# Patient Record
Sex: FEMALE | Race: OTHER | HISPANIC OR LATINO | URBAN - METROPOLITAN AREA
[De-identification: names, ages, dates, MRNs, and addresses within clinical notes are randomized per-mention and may not be internally consistent; named-entity substitution may affect disease eponyms.]

---

## 2017-01-05 ENCOUNTER — INPATIENT (INPATIENT)
Facility: HOSPITAL | Age: 37
LOS: 5 days | Discharge: HOME | DRG: 355 | End: 2017-01-11
Attending: SURGERY | Admitting: SURGERY
Payer: COMMERCIAL

## 2017-01-05 VITALS
SYSTOLIC BLOOD PRESSURE: 178 MMHG | DIASTOLIC BLOOD PRESSURE: 126 MMHG | RESPIRATION RATE: 20 BRPM | HEART RATE: 119 BPM | TEMPERATURE: 98 F | OXYGEN SATURATION: 98 %

## 2017-01-05 DIAGNOSIS — Z98.89 OTHER SPECIFIED POSTPROCEDURAL STATES: Chronic | ICD-10-CM

## 2017-01-05 RX ORDER — SODIUM CHLORIDE 9 MG/ML
2000 INJECTION INTRAMUSCULAR; INTRAVENOUS; SUBCUTANEOUS ONCE
Qty: 0 | Refills: 0 | Status: COMPLETED | OUTPATIENT
Start: 2017-01-05 | End: 2017-01-05

## 2017-01-05 RX ORDER — ONDANSETRON 8 MG/1
4 TABLET, FILM COATED ORAL ONCE
Qty: 0 | Refills: 0 | Status: COMPLETED | OUTPATIENT
Start: 2017-01-05 | End: 2017-01-05

## 2017-01-05 RX ORDER — MORPHINE SULFATE 50 MG/1
4 CAPSULE, EXTENDED RELEASE ORAL ONCE
Qty: 0 | Refills: 0 | Status: DISCONTINUED | OUTPATIENT
Start: 2017-01-05 | End: 2017-01-05

## 2017-01-06 ENCOUNTER — RESULT REVIEW (OUTPATIENT)
Age: 37
End: 2017-01-06

## 2017-01-06 LAB
ALBUMIN SERPL ELPH-MCNC: 3 G/DL — LOW (ref 3.4–5)
ALBUMIN SERPL ELPH-MCNC: 3.3 G/DL — LOW (ref 3.4–5)
ALP SERPL-CCNC: 108 U/L — SIGNIFICANT CHANGE UP (ref 40–120)
ALP SERPL-CCNC: 126 U/L — HIGH (ref 40–120)
ALT FLD-CCNC: 28 U/L — SIGNIFICANT CHANGE UP (ref 12–42)
ALT FLD-CCNC: 31 U/L — SIGNIFICANT CHANGE UP (ref 12–42)
ANION GAP SERPL CALC-SCNC: 11 MMOL/L — SIGNIFICANT CHANGE UP (ref 9–16)
ANION GAP SERPL CALC-SCNC: 14 MMOL/L — SIGNIFICANT CHANGE UP (ref 9–16)
APPEARANCE UR: CLEAR — SIGNIFICANT CHANGE UP
APTT BLD: 31 SEC — SIGNIFICANT CHANGE UP (ref 27.5–37.4)
AST SERPL-CCNC: 19 U/L — SIGNIFICANT CHANGE UP (ref 15–37)
AST SERPL-CCNC: 21 U/L — SIGNIFICANT CHANGE UP (ref 15–37)
BASOPHILS NFR BLD AUTO: 0.1 % — SIGNIFICANT CHANGE UP (ref 0–2)
BASOPHILS NFR BLD AUTO: 0.3 % — SIGNIFICANT CHANGE UP (ref 0–2)
BILIRUB SERPL-MCNC: 0.3 MG/DL — SIGNIFICANT CHANGE UP (ref 0.2–1.2)
BILIRUB SERPL-MCNC: 0.3 MG/DL — SIGNIFICANT CHANGE UP (ref 0.2–1.2)
BILIRUB UR-MCNC: NEGATIVE — SIGNIFICANT CHANGE UP
BLD GP AB SCN SERPL QL: NEGATIVE — SIGNIFICANT CHANGE UP
BUN SERPL-MCNC: 6 MG/DL — LOW (ref 7–23)
BUN SERPL-MCNC: 7 MG/DL — SIGNIFICANT CHANGE UP (ref 7–23)
CALCIUM SERPL-MCNC: 7.8 MG/DL — LOW (ref 8.5–10.5)
CALCIUM SERPL-MCNC: 9 MG/DL — SIGNIFICANT CHANGE UP (ref 8.5–10.5)
CHLORIDE SERPL-SCNC: 102 MMOL/L — SIGNIFICANT CHANGE UP (ref 96–108)
CHLORIDE SERPL-SCNC: 107 MMOL/L — SIGNIFICANT CHANGE UP (ref 96–108)
CO2 SERPL-SCNC: 23 MMOL/L — SIGNIFICANT CHANGE UP (ref 22–31)
CO2 SERPL-SCNC: 23 MMOL/L — SIGNIFICANT CHANGE UP (ref 22–31)
COLOR SPEC: YELLOW — SIGNIFICANT CHANGE UP
CREAT SERPL-MCNC: 0.73 MG/DL — SIGNIFICANT CHANGE UP (ref 0.5–1.3)
CREAT SERPL-MCNC: 0.77 MG/DL — SIGNIFICANT CHANGE UP (ref 0.5–1.3)
DIFF PNL FLD: NEGATIVE — SIGNIFICANT CHANGE UP
EOSINOPHIL NFR BLD AUTO: 0.8 % — SIGNIFICANT CHANGE UP (ref 0–6)
EOSINOPHIL NFR BLD AUTO: 1.5 % — SIGNIFICANT CHANGE UP (ref 0–6)
GLUCOSE SERPL-MCNC: 133 MG/DL — HIGH (ref 70–99)
GLUCOSE SERPL-MCNC: 175 MG/DL — HIGH (ref 70–99)
GLUCOSE UR QL: NEGATIVE — SIGNIFICANT CHANGE UP
HCG SERPL-ACNC: <1 MIU/ML — SIGNIFICANT CHANGE UP
HCT VFR BLD CALC: 38.2 % — SIGNIFICANT CHANGE UP (ref 34.5–45)
HCT VFR BLD CALC: 42.2 % — SIGNIFICANT CHANGE UP (ref 34.5–45)
HGB BLD-MCNC: 12.8 G/DL — SIGNIFICANT CHANGE UP (ref 11.5–15.5)
HGB BLD-MCNC: 14 G/DL — SIGNIFICANT CHANGE UP (ref 11.5–15.5)
INR BLD: 1.12 — SIGNIFICANT CHANGE UP (ref 0.88–1.16)
KETONES UR-MCNC: NEGATIVE — SIGNIFICANT CHANGE UP
LACTATE BLDV-MCNC: 1 MMOL/L — SIGNIFICANT CHANGE UP (ref 0.5–2)
LEUKOCYTE ESTERASE UR-ACNC: (no result)
LYMPHOCYTES # BLD AUTO: 20.1 % — SIGNIFICANT CHANGE UP (ref 13–44)
LYMPHOCYTES # BLD AUTO: 30.3 % — SIGNIFICANT CHANGE UP (ref 13–44)
MCHC RBC-ENTMCNC: 25.5 PG — LOW (ref 27–34)
MCHC RBC-ENTMCNC: 26.4 PG — LOW (ref 27–34)
MCHC RBC-ENTMCNC: 33.2 G/DL — SIGNIFICANT CHANGE UP (ref 32–36)
MCHC RBC-ENTMCNC: 33.5 G/DL — SIGNIFICANT CHANGE UP (ref 32–36)
MCV RBC AUTO: 77 FL — LOW (ref 80–100)
MCV RBC AUTO: 78.8 FL — LOW (ref 80–100)
MONOCYTES NFR BLD AUTO: 5.8 % — SIGNIFICANT CHANGE UP (ref 2–14)
MONOCYTES NFR BLD AUTO: 6.5 % — SIGNIFICANT CHANGE UP (ref 2–14)
NEUTROPHILS NFR BLD AUTO: 61.6 % — SIGNIFICANT CHANGE UP (ref 43–77)
NEUTROPHILS NFR BLD AUTO: 73 % — SIGNIFICANT CHANGE UP (ref 43–77)
NITRITE UR-MCNC: NEGATIVE — SIGNIFICANT CHANGE UP
PH UR: 6 — SIGNIFICANT CHANGE UP (ref 4–8)
PLATELET # BLD AUTO: 307 K/UL — SIGNIFICANT CHANGE UP (ref 150–400)
PLATELET # BLD AUTO: 407 K/UL — HIGH (ref 150–400)
POTASSIUM SERPL-MCNC: 3.6 MMOL/L — SIGNIFICANT CHANGE UP (ref 3.5–5.3)
POTASSIUM SERPL-MCNC: 3.6 MMOL/L — SIGNIFICANT CHANGE UP (ref 3.5–5.3)
POTASSIUM SERPL-SCNC: 3.6 MMOL/L — SIGNIFICANT CHANGE UP (ref 3.5–5.3)
POTASSIUM SERPL-SCNC: 3.6 MMOL/L — SIGNIFICANT CHANGE UP (ref 3.5–5.3)
PROT SERPL-MCNC: 6.6 G/DL — SIGNIFICANT CHANGE UP (ref 6.4–8.2)
PROT SERPL-MCNC: 7.8 G/DL — SIGNIFICANT CHANGE UP (ref 6.4–8.2)
PROT UR-MCNC: NEGATIVE MG/DL — SIGNIFICANT CHANGE UP
PROTHROM AB SERPL-ACNC: 12.5 SEC — SIGNIFICANT CHANGE UP (ref 10–13.1)
RBC # BLD: 4.85 M/UL — SIGNIFICANT CHANGE UP (ref 3.8–5.2)
RBC # BLD: 5.48 M/UL — HIGH (ref 3.8–5.2)
RBC # FLD: 16.4 % — SIGNIFICANT CHANGE UP (ref 10.3–16.9)
RBC # FLD: 16.5 % — SIGNIFICANT CHANGE UP (ref 10.3–16.9)
RH IG SCN BLD-IMP: NEGATIVE — SIGNIFICANT CHANGE UP
SODIUM SERPL-SCNC: 139 MMOL/L — SIGNIFICANT CHANGE UP (ref 135–145)
SODIUM SERPL-SCNC: 141 MMOL/L — SIGNIFICANT CHANGE UP (ref 135–145)
SP GR SPEC: 1.02 — SIGNIFICANT CHANGE UP (ref 1–1.03)
UROBILINOGEN FLD QL: 0.2 E.U./DL — SIGNIFICANT CHANGE UP
WBC # BLD: 14 K/UL — HIGH (ref 3.8–10.5)
WBC # BLD: 15.2 K/UL — HIGH (ref 3.8–10.5)
WBC # FLD AUTO: 14 K/UL — HIGH (ref 3.8–10.5)
WBC # FLD AUTO: 15.2 K/UL — HIGH (ref 3.8–10.5)

## 2017-01-06 PROCEDURE — 74176 CT ABD & PELVIS W/O CONTRAST: CPT | Mod: 26

## 2017-01-06 PROCEDURE — 71010: CPT | Mod: 26

## 2017-01-06 RX ORDER — MAGNESIUM SULFATE 500 MG/ML
2 VIAL (ML) INJECTION ONCE
Qty: 0 | Refills: 0 | Status: COMPLETED | OUTPATIENT
Start: 2017-01-06 | End: 2017-01-06

## 2017-01-06 RX ORDER — DEXTROSE 50 % IN WATER 50 %
25 SYRINGE (ML) INTRAVENOUS ONCE
Qty: 0 | Refills: 0 | Status: DISCONTINUED | OUTPATIENT
Start: 2017-01-06 | End: 2017-01-08

## 2017-01-06 RX ORDER — IBUPROFEN 200 MG
600 TABLET ORAL ONCE
Qty: 0 | Refills: 0 | Status: COMPLETED | OUTPATIENT
Start: 2017-01-06 | End: 2017-01-06

## 2017-01-06 RX ORDER — DIPHENHYDRAMINE HCL 50 MG
50 CAPSULE ORAL ONCE
Qty: 0 | Refills: 0 | Status: COMPLETED | OUTPATIENT
Start: 2017-01-06 | End: 2017-01-06

## 2017-01-06 RX ORDER — HYDROMORPHONE HYDROCHLORIDE 2 MG/ML
1 INJECTION INTRAMUSCULAR; INTRAVENOUS; SUBCUTANEOUS EVERY 4 HOURS
Qty: 0 | Refills: 0 | Status: DISCONTINUED | OUTPATIENT
Start: 2017-01-06 | End: 2017-01-08

## 2017-01-06 RX ORDER — ACETAMINOPHEN 500 MG
1000 TABLET ORAL ONCE
Qty: 0 | Refills: 0 | Status: COMPLETED | OUTPATIENT
Start: 2017-01-06 | End: 2017-01-06

## 2017-01-06 RX ORDER — NALBUPHINE HYDROCHLORIDE 10 MG/ML
10 INJECTION, SOLUTION INTRAMUSCULAR; INTRAVENOUS; SUBCUTANEOUS ONCE
Qty: 0 | Refills: 0 | Status: DISCONTINUED | OUTPATIENT
Start: 2017-01-06 | End: 2017-01-06

## 2017-01-06 RX ORDER — HEPARIN SODIUM 5000 [USP'U]/ML
5000 INJECTION INTRAVENOUS; SUBCUTANEOUS EVERY 12 HOURS
Qty: 0 | Refills: 0 | Status: DISCONTINUED | OUTPATIENT
Start: 2017-01-06 | End: 2017-01-11

## 2017-01-06 RX ORDER — INSULIN LISPRO 100/ML
VIAL (ML) SUBCUTANEOUS
Qty: 0 | Refills: 0 | Status: DISCONTINUED | OUTPATIENT
Start: 2017-01-06 | End: 2017-01-11

## 2017-01-06 RX ORDER — ONDANSETRON 8 MG/1
4 TABLET, FILM COATED ORAL EVERY 6 HOURS
Qty: 0 | Refills: 0 | Status: DISCONTINUED | OUTPATIENT
Start: 2017-01-06 | End: 2017-01-11

## 2017-01-06 RX ORDER — HYDROMORPHONE HYDROCHLORIDE 2 MG/ML
1 INJECTION INTRAMUSCULAR; INTRAVENOUS; SUBCUTANEOUS ONCE
Qty: 0 | Refills: 0 | Status: DISCONTINUED | OUTPATIENT
Start: 2017-01-06 | End: 2017-01-06

## 2017-01-06 RX ORDER — SODIUM CHLORIDE 9 MG/ML
1000 INJECTION, SOLUTION INTRAVENOUS
Qty: 0 | Refills: 0 | Status: DISCONTINUED | OUTPATIENT
Start: 2017-01-06 | End: 2017-01-08

## 2017-01-06 RX ORDER — DIATRIZOATE MEGLUMINE 180 MG/ML
30 INJECTION, SOLUTION INTRAVESICAL ONCE
Qty: 0 | Refills: 0 | Status: COMPLETED | OUTPATIENT
Start: 2017-01-06 | End: 2017-01-06

## 2017-01-06 RX ORDER — HYDROMORPHONE HYDROCHLORIDE 2 MG/ML
2 INJECTION INTRAMUSCULAR; INTRAVENOUS; SUBCUTANEOUS EVERY 4 HOURS
Qty: 0 | Refills: 0 | Status: DISCONTINUED | OUTPATIENT
Start: 2017-01-06 | End: 2017-01-08

## 2017-01-06 RX ADMIN — DIATRIZOATE MEGLUMINE 30 MILLILITER(S): 180 INJECTION, SOLUTION INTRAVESICAL at 00:15

## 2017-01-06 RX ADMIN — HYDROMORPHONE HYDROCHLORIDE 2 MILLIGRAM(S): 2 INJECTION INTRAMUSCULAR; INTRAVENOUS; SUBCUTANEOUS at 21:15

## 2017-01-06 RX ADMIN — SODIUM CHLORIDE 125 MILLILITER(S): 9 INJECTION, SOLUTION INTRAVENOUS at 06:27

## 2017-01-06 RX ADMIN — Medication 400 MILLIGRAM(S): at 10:05

## 2017-01-06 RX ADMIN — HYDROMORPHONE HYDROCHLORIDE 1 MILLIGRAM(S): 2 INJECTION INTRAMUSCULAR; INTRAVENOUS; SUBCUTANEOUS at 12:12

## 2017-01-06 RX ADMIN — HYDROMORPHONE HYDROCHLORIDE 2 MILLIGRAM(S): 2 INJECTION INTRAMUSCULAR; INTRAVENOUS; SUBCUTANEOUS at 18:07

## 2017-01-06 RX ADMIN — Medication 600 MILLIGRAM(S): at 06:20

## 2017-01-06 RX ADMIN — Medication 1000 MILLIGRAM(S): at 10:40

## 2017-01-06 RX ADMIN — HYDROMORPHONE HYDROCHLORIDE 1 MILLIGRAM(S): 2 INJECTION INTRAMUSCULAR; INTRAVENOUS; SUBCUTANEOUS at 06:20

## 2017-01-06 RX ADMIN — HYDROMORPHONE HYDROCHLORIDE 1 MILLIGRAM(S): 2 INJECTION INTRAMUSCULAR; INTRAVENOUS; SUBCUTANEOUS at 12:06

## 2017-01-06 RX ADMIN — HYDROMORPHONE HYDROCHLORIDE 2 MILLIGRAM(S): 2 INJECTION INTRAMUSCULAR; INTRAVENOUS; SUBCUTANEOUS at 16:11

## 2017-01-06 RX ADMIN — SODIUM CHLORIDE 1000 MILLILITER(S): 9 INJECTION INTRAMUSCULAR; INTRAVENOUS; SUBCUTANEOUS at 00:00

## 2017-01-06 RX ADMIN — HYDROMORPHONE HYDROCHLORIDE 1 MILLIGRAM(S): 2 INJECTION INTRAMUSCULAR; INTRAVENOUS; SUBCUTANEOUS at 06:50

## 2017-01-06 RX ADMIN — Medication 600 MILLIGRAM(S): at 06:50

## 2017-01-06 RX ADMIN — HYDROMORPHONE HYDROCHLORIDE 1 MILLIGRAM(S): 2 INJECTION INTRAMUSCULAR; INTRAVENOUS; SUBCUTANEOUS at 00:15

## 2017-01-06 RX ADMIN — Medication 50 MILLIGRAM(S): at 01:11

## 2017-01-06 RX ADMIN — ONDANSETRON 4 MILLIGRAM(S): 8 TABLET, FILM COATED ORAL at 07:50

## 2017-01-06 RX ADMIN — ONDANSETRON 4 MILLIGRAM(S): 8 TABLET, FILM COATED ORAL at 00:00

## 2017-01-06 RX ADMIN — MORPHINE SULFATE 4 MILLIGRAM(S): 50 CAPSULE, EXTENDED RELEASE ORAL at 00:15

## 2017-01-06 RX ADMIN — HYDROMORPHONE HYDROCHLORIDE 1 MILLIGRAM(S): 2 INJECTION INTRAMUSCULAR; INTRAVENOUS; SUBCUTANEOUS at 00:30

## 2017-01-06 RX ADMIN — SODIUM CHLORIDE 125 MILLILITER(S): 9 INJECTION, SOLUTION INTRAVENOUS at 21:30

## 2017-01-06 RX ADMIN — HEPARIN SODIUM 5000 UNIT(S): 5000 INJECTION INTRAVENOUS; SUBCUTANEOUS at 19:15

## 2017-01-06 RX ADMIN — MORPHINE SULFATE 4 MILLIGRAM(S): 50 CAPSULE, EXTENDED RELEASE ORAL at 00:00

## 2017-01-06 RX ADMIN — Medication 50 GRAM(S): at 01:11

## 2017-01-06 NOTE — ED ADULT NURSE REASSESSMENT NOTE - NS ED NURSE REASSESS COMMENT FT1
c.o itching s/p benadryl administration. without hives or respiratory distress. seen by surgery. pending CT

## 2017-01-06 NOTE — H&P ADULT. - ASSESSMENT
37yo w/Htn, Hld, DMII, morbid obesity, h/o csection x 2 and known lower midline ventral hernia presents with 1d of acutely worsening pain associated with ventral hernia coupled with nausea and vomiting.  Patient afebrile, VSS, but with +leukocytosis and elevated lactate on presentation.  CT shows loop of bowel in ventral hernia.  Clinical suspicion for strangulation low but hernia is symptomatic, possibly incarcerated, and at high risk of strangulation due to location.      - Admit to Surgery (Dr. William Rizzo)  - Will book the OR for laparoscopic possible open ventral hernia repair with mesh  - NPO, IVF  - Adequate analgesia  - ISS, prn anti-hypertensives  - PPx: SCDs, SQH

## 2017-01-06 NOTE — H&P ADULT. - ATTENDING COMMENTS
Patient has a chronic incarcerated incisional hernia with a moderately sized defect. There is fat and omentum in the defect but there is no evidence of obstruction or bowel compromised on the CT imaging performed today. I went back and reviewed her prior CT scan in March, 2016 which showed similar findings. She was seen as outpatient with Dr. Kelly for weigh loss surgery and hernia repair. She is not ill appearing. She complains of migrant headache and abdominal pain. She was demanding to have the hernia fixed today even though she was not medically clear and this is not a surgical emergency. I had put her on addon schedule when the OR is available and made her aware that it may not go today. I offered her to follow up in the office on Tuesday to be scheduled electively after medical clearance and optimization and she was not happy with not getting her hernia fixed today. She got upset and demanded to have another surgeon taking care of her. I have discussed the case with Dr. Badillo and [facility] She will be transferred to his care and to be scheduled for repair when there is availability.

## 2017-01-06 NOTE — H&P ADULT. - HISTORY OF PRESENT ILLNESS
37yo w/Htn, Hld, DM, morbid obesity, h/o csection x 2 and known lower midline ventral hernia presents with 1d of acutely worsening pain associated with ventral hernia.  Patient states that the pain started on Patricia alexia roughly two weeks ago when she coughed forcefully while sitting in a strange position.  At that time she felt a "tearing" and then a bulge in the middle of her Csection scar.  Since that day, she says that the bulge has been bothering her but only twinges of pain.  However, over the past day she has started to experience increasing pain at the site especially when in a seated position.  Pain was also associated with nausea and several episodes of NBNB emesis.  Denies fevers/chills.  Patient still endorses having BMs and passing flatus.  Patient has prior admission at [facility] for similar pain in March of 2016.  CT at that time showed a large fat-containing incisional hernia along the midline of her pfannenstiel incision.  Patient had recently seen Dr. Saucedo in his office to discuss a RYGB and they had discussed fixing her hernia at the same time.  CT on this admission shows a loop of small bowel with no e/o strangulation in her lower midline ventral hernia.  Patient also complaining of headache.

## 2017-01-06 NOTE — ED PROVIDER NOTE - PHYSICAL EXAMINATION
CON: ao x 3, HENMT: clear oropharynx, soft neck, HEAD: atraumatic, CV: rrr, equal pulses b/l, RESP: cta b/l, GI: +BS, soft, tender hypogastric midline, limited exam 2/2 body habitus, no rebound, no guarding, SKIN: no rash, MSK: moving all extremities spontaneously, NEURO: nonfocal

## 2017-01-06 NOTE — ED PROVIDER NOTE - CARE PLAN
Principal Discharge DX:	Abdominal hernia Principal Discharge DX:	Abdominal hernia  Secondary Diagnosis:	Abdominal pain

## 2017-01-06 NOTE — ED PROVIDER NOTE - MEDICAL DECISION MAKING DETAILS
surgery recs pre op for OR hernia repair hx of hernia, now pain w/ vomiting, tender abd, will check labs, CT eval for hernia, likely surgical consult and evaluation

## 2017-01-06 NOTE — ED ADULT NURSE REASSESSMENT NOTE - NS ED NURSE REASSESS COMMENT FT1
pt still c/o headache. unable to tolerate PO here. pt threw up PO contrast X 1. dr. mtata made aware. medicated as per emar for headache

## 2017-01-06 NOTE — H&P ADULT. - RS GEN PE MLT RESP DETAILS PC
airway patent/respirations non-labored/normal/breath sounds equal/no wheezes/good air movement/clear to auscultation bilaterally/no rhonchi/no rales

## 2017-01-06 NOTE — ED ADULT NURSE REASSESSMENT NOTE - NS ED NURSE REASSESS COMMENT FT1
EKG done, belongings given to security (three bags and one envelope of valuables). report given to the OR. pt medicated for nausea.

## 2017-01-06 NOTE — ED ADULT NURSE NOTE - OBJECTIVE STATEMENT
RN note: aaox4 female with suprapubic abdominal pain X 2 weeks "it started on lorraine alexia", left flank pain and migraine X several hours. admits to nausea, vomiting, decreased PO intake. noted with non-productive cough X 2 weeks, denies fevers or chills. recently seen in the ED for a UTI, completed antibiotics. admits last BM two days, also reports she's not eating as much 2/2 nausea and vomiting. able to pass gas.. hernia is below umbilical area, able to palpate hard mass. patient was upgraded to dr. matta. will monitor. -onesimo wadsworth RN

## 2017-01-06 NOTE — ED ADULT NURSE REASSESSMENT NOTE - NS ED NURSE REASSESS COMMENT FT1
Patient is upset after speaking to MD Rizzo, reports that she wants to leave. Surgery resident paged to come speak to the patient.

## 2017-01-06 NOTE — ED PROVIDER NOTE - OBJECTIVE STATEMENT
36 yof pw hypogastric abd pain x several days, gradual onset, nonradiating, hx of hernia, no prior abd surgeries.  scheduled for gastric bypass and hernia repair w/ surgeon but sx worsening today.  vomiting.  no fever.

## 2017-01-06 NOTE — ED ADULT NURSE NOTE - CHPI ED SYMPTOMS POS
DECREASED EATING/DRINKING/BLOOD IN STOOL/VOMITING/NAUSEA/DEHYDRATION/ABDOMINAL DISTENSION/LOSS OF APPETITE/DIARRHEA/CRYING/HEMATURIA/CONSTIPATION

## 2017-01-06 NOTE — H&P ADULT. - RADIOLOGY RESULTS AND INTERPRETATION
EXAM:  CT ABDOMEN AND PELVIS                           PROCEDURE DATE:  01/06/2017    IMPRESSION:  1. Large small bowel containing ventral hernia along the anterior pelvic   wall. No evidence of strangulation. No small bowel obstruction.  2. Hepatic steatosis.

## 2017-01-07 LAB
ANION GAP SERPL CALC-SCNC: 10 MMOL/L — SIGNIFICANT CHANGE UP (ref 9–16)
APTT BLD: 33.4 SEC — SIGNIFICANT CHANGE UP (ref 27.5–37.4)
BUN SERPL-MCNC: 4 MG/DL — LOW (ref 7–23)
CALCIUM SERPL-MCNC: 8.6 MG/DL — SIGNIFICANT CHANGE UP (ref 8.5–10.5)
CHLORIDE SERPL-SCNC: 101 MMOL/L — SIGNIFICANT CHANGE UP (ref 96–108)
CO2 SERPL-SCNC: 26 MMOL/L — SIGNIFICANT CHANGE UP (ref 22–31)
CREAT SERPL-MCNC: 0.7 MG/DL — SIGNIFICANT CHANGE UP (ref 0.5–1.3)
CULTURE RESULTS: SIGNIFICANT CHANGE UP
GLUCOSE SERPL-MCNC: 129 MG/DL — HIGH (ref 70–99)
HCG UR QL: NEGATIVE — SIGNIFICANT CHANGE UP
HCT VFR BLD CALC: 42.2 % — SIGNIFICANT CHANGE UP (ref 34.5–45)
HGB BLD-MCNC: 13.6 G/DL — SIGNIFICANT CHANGE UP (ref 11.5–15.5)
INR BLD: 1.13 — SIGNIFICANT CHANGE UP (ref 0.88–1.16)
MAGNESIUM SERPL-MCNC: 2.1 MG/DL — SIGNIFICANT CHANGE UP (ref 1.6–2.4)
MCHC RBC-ENTMCNC: 25.5 PG — LOW (ref 27–34)
MCHC RBC-ENTMCNC: 32.2 G/DL — SIGNIFICANT CHANGE UP (ref 32–36)
MCV RBC AUTO: 79.2 FL — LOW (ref 80–100)
PHOSPHATE SERPL-MCNC: 3 MG/DL — SIGNIFICANT CHANGE UP (ref 2.5–4.5)
PLATELET # BLD AUTO: 375 K/UL — SIGNIFICANT CHANGE UP (ref 150–400)
POTASSIUM SERPL-MCNC: 3.9 MMOL/L — SIGNIFICANT CHANGE UP (ref 3.5–5.3)
POTASSIUM SERPL-SCNC: 3.9 MMOL/L — SIGNIFICANT CHANGE UP (ref 3.5–5.3)
PROTHROM AB SERPL-ACNC: 12.6 SEC — SIGNIFICANT CHANGE UP (ref 10–13.1)
RBC # BLD: 5.33 M/UL — HIGH (ref 3.8–5.2)
RBC # FLD: 16.2 % — SIGNIFICANT CHANGE UP (ref 10.3–16.9)
SODIUM SERPL-SCNC: 137 MMOL/L — SIGNIFICANT CHANGE UP (ref 135–145)
SPECIMEN SOURCE: SIGNIFICANT CHANGE UP
WBC # BLD: 11.9 K/UL — HIGH (ref 3.8–10.5)
WBC # FLD AUTO: 11.9 K/UL — HIGH (ref 3.8–10.5)

## 2017-01-07 PROCEDURE — 71010: CPT | Mod: 26

## 2017-01-07 RX ORDER — ACETAMINOPHEN 500 MG
1000 TABLET ORAL ONCE
Qty: 0 | Refills: 0 | Status: COMPLETED | OUTPATIENT
Start: 2017-01-07 | End: 2017-01-07

## 2017-01-07 RX ORDER — SUMATRIPTAN SUCCINATE 4 MG/.5ML
25 INJECTION, SOLUTION SUBCUTANEOUS ONCE
Qty: 0 | Refills: 0 | Status: DISCONTINUED | OUTPATIENT
Start: 2017-01-07 | End: 2017-01-07

## 2017-01-07 RX ADMIN — Medication 400 MILLIGRAM(S): at 13:19

## 2017-01-07 RX ADMIN — HYDROMORPHONE HYDROCHLORIDE 2 MILLIGRAM(S): 2 INJECTION INTRAMUSCULAR; INTRAVENOUS; SUBCUTANEOUS at 05:45

## 2017-01-07 RX ADMIN — Medication 1000 MILLIGRAM(S): at 13:34

## 2017-01-07 RX ADMIN — ONDANSETRON 4 MILLIGRAM(S): 8 TABLET, FILM COATED ORAL at 09:13

## 2017-01-07 RX ADMIN — HYDROMORPHONE HYDROCHLORIDE 2 MILLIGRAM(S): 2 INJECTION INTRAMUSCULAR; INTRAVENOUS; SUBCUTANEOUS at 18:21

## 2017-01-07 RX ADMIN — HYDROMORPHONE HYDROCHLORIDE 2 MILLIGRAM(S): 2 INJECTION INTRAMUSCULAR; INTRAVENOUS; SUBCUTANEOUS at 09:25

## 2017-01-07 RX ADMIN — HYDROMORPHONE HYDROCHLORIDE 2 MILLIGRAM(S): 2 INJECTION INTRAMUSCULAR; INTRAVENOUS; SUBCUTANEOUS at 09:10

## 2017-01-07 RX ADMIN — HYDROMORPHONE HYDROCHLORIDE 2 MILLIGRAM(S): 2 INJECTION INTRAMUSCULAR; INTRAVENOUS; SUBCUTANEOUS at 05:27

## 2017-01-07 RX ADMIN — Medication 400 MILLIGRAM(S): at 02:59

## 2017-01-07 RX ADMIN — ONDANSETRON 4 MILLIGRAM(S): 8 TABLET, FILM COATED ORAL at 01:45

## 2017-01-07 RX ADMIN — Medication 1000 MILLIGRAM(S): at 03:15

## 2017-01-07 RX ADMIN — HYDROMORPHONE HYDROCHLORIDE 2 MILLIGRAM(S): 2 INJECTION INTRAMUSCULAR; INTRAVENOUS; SUBCUTANEOUS at 02:30

## 2017-01-07 RX ADMIN — HYDROMORPHONE HYDROCHLORIDE 2 MILLIGRAM(S): 2 INJECTION INTRAMUSCULAR; INTRAVENOUS; SUBCUTANEOUS at 18:36

## 2017-01-07 RX ADMIN — HYDROMORPHONE HYDROCHLORIDE 2 MILLIGRAM(S): 2 INJECTION INTRAMUSCULAR; INTRAVENOUS; SUBCUTANEOUS at 01:46

## 2017-01-07 RX ADMIN — ONDANSETRON 4 MILLIGRAM(S): 8 TABLET, FILM COATED ORAL at 18:23

## 2017-01-07 NOTE — PROGRESS NOTE ADULT - SUBJECTIVE AND OBJECTIVE BOX
PRE OPERATIVE NOTE    Pre-op Diagnosis: incarcerated ventral hernia  Procedure: ventral hernia repair  Surgeon: Dr. Hurtado    Consent in chart                          12.8   14.0  )-----------( 307      ( 2017 05:36 )             38.2     2017 05:36    141    |  107    |  6      ----------------------------<  133    3.6     |  23     |  0.73     Ca    7.8        2017 05:36    TPro  6.6    /  Alb  3.0    /  TBili  0.3    /  DBili  x      /  AST  21     /  ALT  28     /  AlkPhos  108    2017 05:36    PT/INR - ( 2017 23:59 )   PT: 12.5 sec;   INR: 1.12          PTT - ( 2017 23:59 )  PTT:31.0 sec  Urinalysis Basic - ( 2017 23:59 )    Color: Yellow / Appearance: Clear / S.025 / pH: x  Gluc: x / Ketone: NEGATIVE  / Bili: NEGATIVE / Urobili: 0.2 E.U./dL   Blood: x / Protein: NEGATIVE mg/dL / Nitrite: NEGATIVE   Leuk Esterase: Trace / RBC: < 5 /HPF / WBC > 10 /HPF   Sq Epi: x / Non Sq Epi: Moderate /HPF / Bacteria: Many /HPF        Type & Screen: done  CXR: wnl  EKG: wnl PRE OPERATIVE NOTE    Pt. seen and examined at bedside. No complaints. Pending OR for ventral hernia repair.    Pre-op Diagnosis: incarcerated ventral hernia  Procedure: ventral hernia repair  Surgeon: Dr. Hurtado    Consent in chart                          12.8   14.0  )-----------( 307      ( 2017 05:36 )             38.2     2017 05:36    141    |  107    |  6      ----------------------------<  133    3.6     |  23     |  0.73     Ca    7.8        2017 05:36    TPro  6.6    /  Alb  3.0    /  TBili  0.3    /  DBili  x      /  AST  21     /  ALT  28     /  AlkPhos  108    2017 05:36    PT/INR - ( 2017 23:59 )   PT: 12.5 sec;   INR: 1.12          PTT - ( 2017 23:59 )  PTT:31.0 sec  Urinalysis Basic - ( 2017 23:59 )    Color: Yellow / Appearance: Clear / S.025 / pH: x  Gluc: x / Ketone: NEGATIVE  / Bili: NEGATIVE / Urobili: 0.2 E.U./dL   Blood: x / Protein: NEGATIVE mg/dL / Nitrite: NEGATIVE   Leuk Esterase: Trace / RBC: < 5 /HPF / WBC > 10 /HPF   Sq Epi: x / Non Sq Epi: Moderate /HPF / Bacteria: Many /HPF        Type & Screen: done  CXR: wnl  EKG: wnl    PE:     GEN: NAD  CV: RRR s m/g/r, +S1, S2  PULM: CTA B  ABD: S, TTP lower midline ventral hernia, (-) rebound, (-) guarding. (+)BS  EXT: peripheral pulses intact, no edema.

## 2017-01-07 NOTE — PROGRESS NOTE ADULT - ASSESSMENT
A/P: 36yFemale planned for above procedure  1. NPO past midnight, except medications  2. IVFlactated ringers. 1000milliLiter(s) IV Continuous <Continuous>    3. [ ] Blood on hold, Units: A/P: 36yFemale planned for above procedure  1. NPO past midnight, except medications  2. LR.  3. Pain/nausea control  4. DVT ppx

## 2017-01-08 RX ORDER — DEXTROSE 50 % IN WATER 50 %
25 SYRINGE (ML) INTRAVENOUS ONCE
Qty: 0 | Refills: 0 | Status: DISCONTINUED | OUTPATIENT
Start: 2017-01-08 | End: 2017-01-11

## 2017-01-08 RX ORDER — METOPROLOL TARTRATE 50 MG
5 TABLET ORAL ONCE
Qty: 0 | Refills: 0 | Status: COMPLETED | OUTPATIENT
Start: 2017-01-08 | End: 2017-01-08

## 2017-01-08 RX ORDER — LABETALOL HCL 100 MG
5 TABLET ORAL ONCE
Qty: 0 | Refills: 0 | Status: DISCONTINUED | OUTPATIENT
Start: 2017-01-08 | End: 2017-01-08

## 2017-01-08 RX ORDER — HYDROMORPHONE HYDROCHLORIDE 2 MG/ML
1 INJECTION INTRAMUSCULAR; INTRAVENOUS; SUBCUTANEOUS
Qty: 0 | Refills: 0 | Status: DISCONTINUED | OUTPATIENT
Start: 2017-01-08 | End: 2017-01-08

## 2017-01-08 RX ORDER — METOPROLOL TARTRATE 50 MG
20 TABLET ORAL
Qty: 0 | Refills: 0 | Status: DISCONTINUED | OUTPATIENT
Start: 2017-01-08 | End: 2017-01-08

## 2017-01-08 RX ORDER — HYDROMORPHONE HYDROCHLORIDE 2 MG/ML
1 INJECTION INTRAMUSCULAR; INTRAVENOUS; SUBCUTANEOUS ONCE
Qty: 0 | Refills: 0 | Status: DISCONTINUED | OUTPATIENT
Start: 2017-01-08 | End: 2017-01-08

## 2017-01-08 RX ORDER — METOPROLOL TARTRATE 50 MG
100 TABLET ORAL DAILY
Qty: 0 | Refills: 0 | Status: DISCONTINUED | OUTPATIENT
Start: 2017-01-08 | End: 2017-01-11

## 2017-01-08 RX ORDER — METOPROLOL TARTRATE 50 MG
5 TABLET ORAL EVERY 6 HOURS
Qty: 0 | Refills: 0 | Status: DISCONTINUED | OUTPATIENT
Start: 2017-01-08 | End: 2017-01-08

## 2017-01-08 RX ORDER — ACETAMINOPHEN 500 MG
1000 TABLET ORAL ONCE
Qty: 0 | Refills: 0 | Status: COMPLETED | OUTPATIENT
Start: 2017-01-08 | End: 2017-01-08

## 2017-01-08 RX ADMIN — Medication 100 MILLIGRAM(S): at 21:28

## 2017-01-08 RX ADMIN — Medication 5 MILLIGRAM(S): at 04:06

## 2017-01-08 RX ADMIN — Medication 100 MILLIGRAM(S): at 13:47

## 2017-01-08 RX ADMIN — HYDROMORPHONE HYDROCHLORIDE 2 MILLIGRAM(S): 2 INJECTION INTRAMUSCULAR; INTRAVENOUS; SUBCUTANEOUS at 02:04

## 2017-01-08 RX ADMIN — Medication 100 MILLIGRAM(S): at 08:58

## 2017-01-08 RX ADMIN — ONDANSETRON 4 MILLIGRAM(S): 8 TABLET, FILM COATED ORAL at 10:02

## 2017-01-08 RX ADMIN — Medication 400 MILLIGRAM(S): at 04:52

## 2017-01-08 RX ADMIN — Medication: at 06:59

## 2017-01-08 RX ADMIN — Medication 204 MILLIGRAM(S): at 04:37

## 2017-01-08 RX ADMIN — HYDROMORPHONE HYDROCHLORIDE 1 MILLIGRAM(S): 2 INJECTION INTRAMUSCULAR; INTRAVENOUS; SUBCUTANEOUS at 14:51

## 2017-01-08 RX ADMIN — HYDROMORPHONE HYDROCHLORIDE 1 MILLIGRAM(S): 2 INJECTION INTRAMUSCULAR; INTRAVENOUS; SUBCUTANEOUS at 02:55

## 2017-01-08 RX ADMIN — HEPARIN SODIUM 5000 UNIT(S): 5000 INJECTION INTRAVENOUS; SUBCUTANEOUS at 06:59

## 2017-01-08 RX ADMIN — Medication: at 01:20

## 2017-01-08 RX ADMIN — HYDROMORPHONE HYDROCHLORIDE 1 MILLIGRAM(S): 2 INJECTION INTRAMUSCULAR; INTRAVENOUS; SUBCUTANEOUS at 04:28

## 2017-01-08 RX ADMIN — HEPARIN SODIUM 5000 UNIT(S): 5000 INJECTION INTRAVENOUS; SUBCUTANEOUS at 17:45

## 2017-01-08 RX ADMIN — HYDROMORPHONE HYDROCHLORIDE 1 MILLIGRAM(S): 2 INJECTION INTRAMUSCULAR; INTRAVENOUS; SUBCUTANEOUS at 03:09

## 2017-01-08 RX ADMIN — Medication 100 MILLIGRAM(S): at 06:59

## 2017-01-08 RX ADMIN — Medication 5 MILLIGRAM(S): at 02:30

## 2017-01-08 RX ADMIN — HYDROMORPHONE HYDROCHLORIDE 2 MILLIGRAM(S): 2 INJECTION INTRAMUSCULAR; INTRAVENOUS; SUBCUTANEOUS at 06:59

## 2017-01-08 RX ADMIN — HYDROMORPHONE HYDROCHLORIDE 2 MILLIGRAM(S): 2 INJECTION INTRAMUSCULAR; INTRAVENOUS; SUBCUTANEOUS at 07:15

## 2017-01-08 RX ADMIN — ONDANSETRON 4 MILLIGRAM(S): 8 TABLET, FILM COATED ORAL at 02:47

## 2017-01-08 RX ADMIN — HYDROMORPHONE HYDROCHLORIDE 1 MILLIGRAM(S): 2 INJECTION INTRAMUSCULAR; INTRAVENOUS; SUBCUTANEOUS at 04:13

## 2017-01-08 RX ADMIN — Medication 1000 MILLIGRAM(S): at 05:52

## 2017-01-08 RX ADMIN — HYDROMORPHONE HYDROCHLORIDE 2 MILLIGRAM(S): 2 INJECTION INTRAMUSCULAR; INTRAVENOUS; SUBCUTANEOUS at 01:50

## 2017-01-08 RX ADMIN — HYDROMORPHONE HYDROCHLORIDE 1 MILLIGRAM(S): 2 INJECTION INTRAMUSCULAR; INTRAVENOUS; SUBCUTANEOUS at 15:15

## 2017-01-08 NOTE — PROGRESS NOTE ADULT - ASSESSMENT
36yoF with PMH HTN, HLD, DM admitted for ventral hernia repair with mesh POD1  Pain/nausea control  HTN control with IV Lopressor q6 and PRN. Switch to PO dose of 100mg Lopressor XL when tolerating PO  incentive spirometer  Monitor ABBIE output  NPO/ISS  IVF LR@ 125  SQH/SCDs/ENOC Cook 36yoF with PMH HTN, HLD, DM admitted for ventral hernia repair with mesh POD1  Pain/nausea control  HTN control with IV Lopressor q6 and PRN. Switch to PO dose of 100mg Lopressor XL when tolerating PO  incentive spirometer  Monitor ABBIE output  NPO/ISS  clindamycin  IVF LR@ 125  SQH/SCDs/ENOC Cook 36yoF with PMH HTN, HLD, DM admitted for ventral hernia repair with mesh POD1  - advance to CLD/ HL when tolerating adequate PO  - Pain/nausea control  - DVT PPx-SQH/SCDs  OOB/A  - DC holley   monitor bowel function

## 2017-01-08 NOTE — PROGRESS NOTE ADULT - SUBJECTIVE AND OBJECTIVE BOX
POST-OPERATIVE NOTE    Procedure: Ventral hernia repair with mesh    Diagnosis/Indication: Ventral hernia    Surgeon: Dr. Hurtado    S: Denies CP, SOB, FORBES, calf tenderness. Pt reports significant abdominal pain. Curses profusely. Also reports nausea, HA, and feeling hot.    O:  T(C): 36.3, Max: 37.2 (01-08 @ 01:10)  T(F): 97.3, Max: 98.9 (01-08 @ 01:10)  HR: 63 (63 - 98)  BP: 181/99 (142/89 - 181/100)  RR: 18 (16 - 18)  SpO2: 95% (95% - 98%)  Wt(kg): --                        13.6   11.9  )-----------( 375      ( 07 Jan 2017 08:37 )             42.2     07 Jan 2017 08:37    137    |  101    |  4      ----------------------------<  129    3.9     |  26     |  0.70     Ca    8.6        07 Jan 2017 08:37  Phos  3.0       07 Jan 2017 08:37  Mg     2.1       07 Jan 2017 08:37    TPro  6.6    /  Alb  3.0    /  TBili  0.3    /  DBili  x      /  AST  21     /  ALT  28     /  AlkPhos  108    06 Jan 2017 05:36      Gen: On exam, pt appears uncomfortable, diaphoretic. When left alone, pt appears to be in NAD.  Pulm: Nonlabored breathing, no respiratory distress  Abd: soft, ND, obese. Appropriately tender to palpation in LLQ. Incision with bandage, CDI. ABBIE drain LLQ with serosanguinous output.  Extrem: WWP, no calf edema or tenderness.  Cook in place, clear yellow urine        Dispo plan: pending pain control, PO tolerance POST-OPERATIVE NOTE    Procedure: Ventral hernia repair with mesh    Diagnosis/Indication: Ventral hernia    Surgeon: Dr. Hurtado    S: Denies CP, SOB, FORBES, calf tenderness. Pt reports significant abdominal pain. Curses profusely. Also reports nausea, HA, and feeling hot.    O:  T(C): 36.3, Max: 37.2 (01-08 @ 01:10)  T(F): 97.3, Max: 98.9 (01-08 @ 01:10)  HR: 63 (63 - 98)  BP: 181/99 (142/89 - 181/100)  RR: 18 (16 - 18)  SpO2: 95% (95% - 98%)  Wt(kg): --                        13.6   11.9  )-----------( 375      ( 07 Jan 2017 08:37 )             42.2     07 Jan 2017 08:37    137    |  101    |  4      ----------------------------<  129    3.9     |  26     |  0.70     Ca    8.6        07 Jan 2017 08:37  Phos  3.0       07 Jan 2017 08:37  Mg     2.1       07 Jan 2017 08:37    TPro  6.6    /  Alb  3.0    /  TBili  0.3    /  DBili  x      /  AST  21     /  ALT  28     /  AlkPhos  108    06 Jan 2017 05:36      Gen: On exam, pt appears uncomfortable, diaphoretic. When left alone, pt appears to be in NAD.  Pulm: Nonlabored breathing, no respiratory distress  Abd: soft, ND, obese. Appropriately tender to palpation in LLQ. Incision with bandage, CDI. ABBIE drain LLQ with serosanguinous output.  Extrem: WWP, no calf edema or tenderness.  Cook in place, clear yellow urine

## 2017-01-08 NOTE — PROGRESS NOTE ADULT - ASSESSMENT
A/P: 36y Female s/p above procedure  Diet: NPO  IVF: LR  IV abx x24hrs  HTN control  Pain/nausea control  DVT ppx: SQH, SCDs, OOBA A/P: 36y Female s/p above procedure  Diet: NPO  IVF: LR  IV abx x24hrs  HTN control  Pain/nausea control  DVT ppx: SQH, SCDs, OOBA  Dispo plan: pending pain control, PO tolerance

## 2017-01-08 NOTE — PROGRESS NOTE ADULT - SUBJECTIVE AND OBJECTIVE BOX
STATUS POST:  Ventral hernia repair with mesh POD1     SUBJECTIVE: Overnight, pt went to OR for ventral hernia repair with mesh. On POC, pt complaining of pain, feeling hot, HA, feeling nauseous, swearing continuously. SBP 180s, given Lopressor 5mg x1 IV, bringing SBP to 140s. Pt given phenergan, IV Tylenol, and 3mg Dilaudid in 2hr time span.    heparin  Injectable 5000Unit(s) SubCutaneous every 12 hours  clindamycin IVPB 900milliGRAM(s) IV Intermittent every 8 hours  metoprolol Injectable 5milliGRAM(s) IV Push every 6 hours      Vital Signs Last 24 Hrs  T(C): 36.3, Max: 37.2 (01-08 @ 01:10)  T(F): 97.3, Max: 98.9 (01-08 @ 01:10)  HR: 74 (63 - 98)  BP: 149/92 (139/83 - 181/100)  BP(mean): --  RR: 18 (16 - 18)  SpO2: 95% (95% - 100%)  I&O's Detail      General: NAD, resting comfortably in bed  Pulm: Nonlabored breathing, no respiratory distress  Abd: soft, ND, appropriately tender to palpation. Incision with bandage CDI. ABBIE in LLQ with serosanguinous output.  Extrem: WWP, no edema, SCDs in place        LABS:                        13.6   11.9  )-----------( 375      ( 07 Jan 2017 08:37 )             42.2     07 Jan 2017 08:37    137    |  101    |  4      ----------------------------<  129    3.9     |  26     |  0.70     Ca    8.6        07 Jan 2017 08:37  Phos  3.0       07 Jan 2017 08:37  Mg     2.1       07 Jan 2017 08:37    TPro  6.6    /  Alb  3.0    /  TBili  0.3    /  DBili  x      /  AST  21     /  ALT  28     /  AlkPhos  108    06 Jan 2017 05:36    PT/INR - ( 07 Jan 2017 08:37 )   PT: 12.6 sec;   INR: 1.13          PTT - ( 07 Jan 2017 08:37 )  PTT:33.4 sec      RADIOLOGY & ADDITIONAL STUDIES:

## 2017-01-09 LAB
ANION GAP SERPL CALC-SCNC: 13 MMOL/L — SIGNIFICANT CHANGE UP (ref 9–16)
BUN SERPL-MCNC: 8 MG/DL — SIGNIFICANT CHANGE UP (ref 7–23)
CALCIUM SERPL-MCNC: 8.7 MG/DL — SIGNIFICANT CHANGE UP (ref 8.5–10.5)
CHLORIDE SERPL-SCNC: 101 MMOL/L — SIGNIFICANT CHANGE UP (ref 96–108)
CO2 SERPL-SCNC: 24 MMOL/L — SIGNIFICANT CHANGE UP (ref 22–31)
CREAT SERPL-MCNC: 0.88 MG/DL — SIGNIFICANT CHANGE UP (ref 0.5–1.3)
GLUCOSE SERPL-MCNC: 160 MG/DL — HIGH (ref 70–99)
HCT VFR BLD CALC: 43.1 % — SIGNIFICANT CHANGE UP (ref 34.5–45)
HGB BLD-MCNC: 13.8 G/DL — SIGNIFICANT CHANGE UP (ref 11.5–15.5)
MAGNESIUM SERPL-MCNC: 2.3 MG/DL — SIGNIFICANT CHANGE UP (ref 1.6–2.4)
MCHC RBC-ENTMCNC: 25.3 PG — LOW (ref 27–34)
MCHC RBC-ENTMCNC: 32 G/DL — SIGNIFICANT CHANGE UP (ref 32–36)
MCV RBC AUTO: 79.1 FL — LOW (ref 80–100)
PHOSPHATE SERPL-MCNC: 3.2 MG/DL — SIGNIFICANT CHANGE UP (ref 2.5–4.5)
PLATELET # BLD AUTO: 469 K/UL — HIGH (ref 150–400)
POTASSIUM SERPL-MCNC: 3.7 MMOL/L — SIGNIFICANT CHANGE UP (ref 3.5–5.3)
POTASSIUM SERPL-SCNC: 3.7 MMOL/L — SIGNIFICANT CHANGE UP (ref 3.5–5.3)
RBC # BLD: 5.45 M/UL — HIGH (ref 3.8–5.2)
RBC # FLD: 16.9 % — SIGNIFICANT CHANGE UP (ref 10.3–16.9)
SODIUM SERPL-SCNC: 138 MMOL/L — SIGNIFICANT CHANGE UP (ref 135–145)
WBC # BLD: 15.4 K/UL — HIGH (ref 3.8–10.5)
WBC # FLD AUTO: 15.4 K/UL — HIGH (ref 3.8–10.5)

## 2017-01-09 RX ORDER — HYDROMORPHONE HYDROCHLORIDE 2 MG/ML
4 INJECTION INTRAMUSCULAR; INTRAVENOUS; SUBCUTANEOUS ONCE
Qty: 0 | Refills: 0 | Status: DISCONTINUED | OUTPATIENT
Start: 2017-01-09 | End: 2017-01-09

## 2017-01-09 RX ORDER — HYDROMORPHONE HYDROCHLORIDE 2 MG/ML
4 INJECTION INTRAMUSCULAR; INTRAVENOUS; SUBCUTANEOUS EVERY 4 HOURS
Qty: 0 | Refills: 0 | Status: DISCONTINUED | OUTPATIENT
Start: 2017-01-09 | End: 2017-01-11

## 2017-01-09 RX ORDER — POTASSIUM CHLORIDE 20 MEQ
20 PACKET (EA) ORAL
Qty: 0 | Refills: 0 | Status: COMPLETED | OUTPATIENT
Start: 2017-01-09 | End: 2017-01-09

## 2017-01-09 RX ORDER — HYDROMORPHONE HYDROCHLORIDE 2 MG/ML
1 INJECTION INTRAMUSCULAR; INTRAVENOUS; SUBCUTANEOUS ONCE
Qty: 0 | Refills: 0 | Status: DISCONTINUED | OUTPATIENT
Start: 2017-01-09 | End: 2017-01-09

## 2017-01-09 RX ADMIN — Medication 2: at 07:18

## 2017-01-09 RX ADMIN — HYDROMORPHONE HYDROCHLORIDE 4 MILLIGRAM(S): 2 INJECTION INTRAMUSCULAR; INTRAVENOUS; SUBCUTANEOUS at 20:38

## 2017-01-09 RX ADMIN — HYDROMORPHONE HYDROCHLORIDE 4 MILLIGRAM(S): 2 INJECTION INTRAMUSCULAR; INTRAVENOUS; SUBCUTANEOUS at 06:45

## 2017-01-09 RX ADMIN — HYDROMORPHONE HYDROCHLORIDE 4 MILLIGRAM(S): 2 INJECTION INTRAMUSCULAR; INTRAVENOUS; SUBCUTANEOUS at 10:41

## 2017-01-09 RX ADMIN — Medication 20 MILLIEQUIVALENT(S): at 09:17

## 2017-01-09 RX ADMIN — Medication 100 MILLIGRAM(S): at 05:41

## 2017-01-09 RX ADMIN — HYDROMORPHONE HYDROCHLORIDE 4 MILLIGRAM(S): 2 INJECTION INTRAMUSCULAR; INTRAVENOUS; SUBCUTANEOUS at 07:45

## 2017-01-09 RX ADMIN — HEPARIN SODIUM 5000 UNIT(S): 5000 INJECTION INTRAVENOUS; SUBCUTANEOUS at 18:18

## 2017-01-09 RX ADMIN — HYDROMORPHONE HYDROCHLORIDE 4 MILLIGRAM(S): 2 INJECTION INTRAMUSCULAR; INTRAVENOUS; SUBCUTANEOUS at 11:41

## 2017-01-09 RX ADMIN — HYDROMORPHONE HYDROCHLORIDE 4 MILLIGRAM(S): 2 INJECTION INTRAMUSCULAR; INTRAVENOUS; SUBCUTANEOUS at 21:38

## 2017-01-09 RX ADMIN — Medication 20 MILLIEQUIVALENT(S): at 11:00

## 2017-01-09 RX ADMIN — Medication 2: at 17:11

## 2017-01-09 RX ADMIN — HEPARIN SODIUM 5000 UNIT(S): 5000 INJECTION INTRAVENOUS; SUBCUTANEOUS at 05:41

## 2017-01-09 NOTE — PROGRESS NOTE ADULT - ASSESSMENT
36yoF with PMH HTN, HLD, DM admitted for ventral hernia repair with mesh POD1      Regular diet   - Pain/nausea control  - DVT PPx-SQH/SCDs  OOB/A  - DC leydi 36yoF with PMH HTN, HLD, DM admitted for ventral hernia repair with mesh POD1  Regular diet   - Pain/nausea control  - DVT PPx-SQH/SCDs  OOB/A  d/c today with ABBIE drain

## 2017-01-09 NOTE — PROGRESS NOTE ADULT - SUBJECTIVE AND OBJECTIVE BOX
O/N: tolerating reg diet. VSS  1/8: advanced to CLD and tolerating; leydi martinez- passed TOV; OOB/A; ABBIE- S/S; advanced to regular diet for dinner O/N: tolerating reg diet. VSS  1/8: advanced to CLD and tolerating; leydi dc- passed TOV; OOB/A; ABBIE- S/S; advanced to regular diet for dinner        SUBJECTIVE:  Flatus: [ ] YES [ ] NO             Bowel Movement: [ ] YES [ ] NO  Pain (0-10):            Pain Control Adequate: [ ] YES [ ] NO  Nausea: [ ] YES [ ] NO            Vomiting: [ ] YES [ ] NO  Diarrhea: [ ] YES [ ] NO         Constipation: [ ] YES [ ] NO     Chest Pain: [ ] YES [ ] NO    SOB:  [ ] YES [ ] NO    MEDICATIONS  (STANDING):  heparin  Injectable 5000Unit(s) SubCutaneous every 12 hours  insulin lispro (HumaLOG) corrective regimen sliding scale  SubCutaneous three times a day before meals  dextrose 50% Injectable 25Gram(s) IV Push once  metoprolol succinate ER 100milliGRAM(s) Oral daily  potassium chloride    Tablet ER 20milliEquivalent(s) Oral every 2 hours    MEDICATIONS  (PRN):  ondansetron Injectable 4milliGRAM(s) IV Push every 6 hours PRN Nausea  oxyCODONE  5 mG/acetaminophen 325 mG 1Tablet(s) Oral every 4 hours PRN Moderate Pain (4 - 6)  oxyCODONE  5 mG/acetaminophen 325 mG 2Tablet(s) Oral every 4 hours PRN Severe Pain (7 - 10)      Vital Signs Last 24 Hrs  T(C): 36.7, Max: 36.9 (01-08 @ 09:10)  T(F): 98, Max: 98.5 (01-08 @ 09:10)  HR: 85 (76 - 99)  BP: 156/93 (111/60 - 163/84)  BP(mean): --  RR: 15 (15 - 17)  SpO2: 98% (96% - 98%)    Physical Exam:  General: NAD, resting comfortably in bed  Pulmonary: Nonlabored breathing, no respiratory distress  Cardiovascular: NSR  Abdominal: soft, NT/ND  Extremities: WWP, normal strength  Neuro: A/O x 3, CNs II-XII grossly intact, no focal deficits, normal motor/sensation  Pulses: palpable distal pulses    I&O's Summary      LABS:                        13.8   15.4  )-----------( 469      ( 09 Jan 2017 07:00 )             43.1     09 Jan 2017 07:00    138    |  101    |  8      ----------------------------<  160    3.7     |  24     |  0.88     Ca    8.7        09 Jan 2017 07:00  Phos  3.2       09 Jan 2017 07:00  Mg     2.3       09 Jan 2017 07:00          CAPILLARY BLOOD GLUCOSE  145 (08 Jan 2017 21:38)  130 (08 Jan 2017 16:22)  125 (08 Jan 2017 11:13)        RADIOLOGY & ADDITIONAL STUDIES:    A/P: 36y Female POD #      s/p   - O/N: tolerating reg diet. VSS  1/8: advanced to CLD and tolerating; leydi dc- passed TOV; OOB/A; ABBIE- S/S; advanced to regular diet for dinner        SUBJECTIVE:  Flatus: [ ] YES [ ] NO             Bowel Movement: [ ] YES [ ] NO  Pain (0-10):            Pain Control Adequate: [ ] YES [ x] NO  Nausea: [ ] YES [ x] NO            Vomiting: [ ] YES [ x] NO  Diarrhea: [ ] YES [ ] NO         Constipation: [ ] YES [ ] NO     Chest Pain: [ ] YES [ x] NO    SOB:  [ ] YES [x ] NO    MEDICATIONS  (STANDING):  heparin  Injectable 5000Unit(s) SubCutaneous every 12 hours  insulin lispro (HumaLOG) corrective regimen sliding scale  SubCutaneous three times a day before meals  dextrose 50% Injectable 25Gram(s) IV Push once  metoprolol succinate ER 100milliGRAM(s) Oral daily  potassium chloride    Tablet ER 20milliEquivalent(s) Oral every 2 hours    MEDICATIONS  (PRN):  ondansetron Injectable 4milliGRAM(s) IV Push every 6 hours PRN Nausea  oxyCODONE  5 mG/acetaminophen 325 mG 1Tablet(s) Oral every 4 hours PRN Moderate Pain (4 - 6)  oxyCODONE  5 mG/acetaminophen 325 mG 2Tablet(s) Oral every 4 hours PRN Severe Pain (7 - 10)      Vital Signs Last 24 Hrs  T(C): 36.7, Max: 36.9 (01-08 @ 09:10)  T(F): 98, Max: 98.5 (01-08 @ 09:10)  HR: 85 (76 - 99)  BP: 156/93 (111/60 - 163/84)  BP(mean): --  RR: 15 (15 - 17)  SpO2: 98% (96% - 98%)    Physical Exam:  General: NAD, resting comfortably in bed  Pulmonary: Nonlabored breathing, no respiratory distress  Cardiovascular: NSR  Abdominal: soft, obese, ND, TTP appropriately over incision which is c/d/i  Extremities: WWP, normal strength  Neuro: A/O x 3, CNs II-XII grossly intact, no focal deficits, normal motor/sensation  Pulses: palpable distal pulses    I&O's Summary      LABS:                        13.8   15.4  )-----------( 469      ( 09 Jan 2017 07:00 )             43.1     09 Jan 2017 07:00    138    |  101    |  8      ----------------------------<  160    3.7     |  24     |  0.88     Ca    8.7        09 Jan 2017 07:00  Phos  3.2       09 Jan 2017 07:00  Mg     2.3       09 Jan 2017 07:00          CAPILLARY BLOOD GLUCOSE  145 (08 Jan 2017 21:38)  130 (08 Jan 2017 16:22)  125 (08 Jan 2017 11:13)        RADIOLOGY & ADDITIONAL STUDIES:    A/P: 36y Female POD #      s/p   -

## 2017-01-10 LAB
ANION GAP SERPL CALC-SCNC: 14 MMOL/L — SIGNIFICANT CHANGE UP (ref 9–16)
APPEARANCE UR: CLEAR — SIGNIFICANT CHANGE UP
BILIRUB UR-MCNC: NEGATIVE — SIGNIFICANT CHANGE UP
BUN SERPL-MCNC: 5 MG/DL — LOW (ref 7–23)
CALCIUM SERPL-MCNC: 9 MG/DL — SIGNIFICANT CHANGE UP (ref 8.5–10.5)
CHLORIDE SERPL-SCNC: 103 MMOL/L — SIGNIFICANT CHANGE UP (ref 96–108)
CO2 SERPL-SCNC: 20 MMOL/L — LOW (ref 22–31)
COLOR SPEC: YELLOW — SIGNIFICANT CHANGE UP
CREAT SERPL-MCNC: 0.63 MG/DL — SIGNIFICANT CHANGE UP (ref 0.5–1.3)
DIFF PNL FLD: (no result)
GLUCOSE SERPL-MCNC: 145 MG/DL — HIGH (ref 70–99)
GLUCOSE UR QL: NEGATIVE — SIGNIFICANT CHANGE UP
HCT VFR BLD CALC: 42.8 % — SIGNIFICANT CHANGE UP (ref 34.5–45)
HGB BLD-MCNC: 14.1 G/DL — SIGNIFICANT CHANGE UP (ref 11.5–15.5)
KETONES UR-MCNC: NEGATIVE — SIGNIFICANT CHANGE UP
LEUKOCYTE ESTERASE UR-ACNC: (no result)
MAGNESIUM SERPL-MCNC: 2.3 MG/DL — SIGNIFICANT CHANGE UP (ref 1.6–2.4)
MCHC RBC-ENTMCNC: 26.6 PG — LOW (ref 27–34)
MCHC RBC-ENTMCNC: 32.9 G/DL — SIGNIFICANT CHANGE UP (ref 32–36)
MCV RBC AUTO: 80.6 FL — SIGNIFICANT CHANGE UP (ref 80–100)
NITRITE UR-MCNC: NEGATIVE — SIGNIFICANT CHANGE UP
PH UR: 6 — SIGNIFICANT CHANGE UP (ref 4–8)
PHOSPHATE SERPL-MCNC: 3.3 MG/DL — SIGNIFICANT CHANGE UP (ref 2.5–4.5)
PLATELET # BLD AUTO: 389 K/UL — SIGNIFICANT CHANGE UP (ref 150–400)
POTASSIUM SERPL-MCNC: 3.9 MMOL/L — SIGNIFICANT CHANGE UP (ref 3.5–5.3)
POTASSIUM SERPL-SCNC: 3.9 MMOL/L — SIGNIFICANT CHANGE UP (ref 3.5–5.3)
PROT UR-MCNC: NEGATIVE MG/DL — SIGNIFICANT CHANGE UP
RBC # BLD: 5.31 M/UL — HIGH (ref 3.8–5.2)
RBC # FLD: 16.9 % — SIGNIFICANT CHANGE UP (ref 10.3–16.9)
SODIUM SERPL-SCNC: 137 MMOL/L — SIGNIFICANT CHANGE UP (ref 135–145)
SP GR SPEC: 1.02 — SIGNIFICANT CHANGE UP (ref 1–1.03)
UROBILINOGEN FLD QL: 0.2 E.U./DL — SIGNIFICANT CHANGE UP
WBC # BLD: 14 K/UL — HIGH (ref 3.8–10.5)
WBC # FLD AUTO: 14 K/UL — HIGH (ref 3.8–10.5)

## 2017-01-10 RX ORDER — IPRATROPIUM BROMIDE 0.2 MG/ML
1 SOLUTION, NON-ORAL INHALATION EVERY 6 HOURS
Qty: 0 | Refills: 0 | Status: DISCONTINUED | OUTPATIENT
Start: 2017-01-10 | End: 2017-01-10

## 2017-01-10 RX ORDER — ALBUTEROL 90 UG/1
2 AEROSOL, METERED ORAL EVERY 6 HOURS
Qty: 0 | Refills: 0 | Status: DISCONTINUED | OUTPATIENT
Start: 2017-01-10 | End: 2017-01-11

## 2017-01-10 RX ADMIN — HEPARIN SODIUM 5000 UNIT(S): 5000 INJECTION INTRAVENOUS; SUBCUTANEOUS at 06:17

## 2017-01-10 RX ADMIN — HEPARIN SODIUM 5000 UNIT(S): 5000 INJECTION INTRAVENOUS; SUBCUTANEOUS at 17:44

## 2017-01-10 RX ADMIN — HYDROMORPHONE HYDROCHLORIDE 4 MILLIGRAM(S): 2 INJECTION INTRAMUSCULAR; INTRAVENOUS; SUBCUTANEOUS at 23:00

## 2017-01-10 RX ADMIN — HYDROMORPHONE HYDROCHLORIDE 4 MILLIGRAM(S): 2 INJECTION INTRAMUSCULAR; INTRAVENOUS; SUBCUTANEOUS at 01:40

## 2017-01-10 RX ADMIN — HYDROMORPHONE HYDROCHLORIDE 4 MILLIGRAM(S): 2 INJECTION INTRAMUSCULAR; INTRAVENOUS; SUBCUTANEOUS at 18:45

## 2017-01-10 RX ADMIN — HYDROMORPHONE HYDROCHLORIDE 4 MILLIGRAM(S): 2 INJECTION INTRAMUSCULAR; INTRAVENOUS; SUBCUTANEOUS at 02:40

## 2017-01-10 RX ADMIN — Medication 100 MILLIGRAM(S): at 06:34

## 2017-01-10 RX ADMIN — HYDROMORPHONE HYDROCHLORIDE 4 MILLIGRAM(S): 2 INJECTION INTRAMUSCULAR; INTRAVENOUS; SUBCUTANEOUS at 09:58

## 2017-01-10 RX ADMIN — HYDROMORPHONE HYDROCHLORIDE 4 MILLIGRAM(S): 2 INJECTION INTRAMUSCULAR; INTRAVENOUS; SUBCUTANEOUS at 22:13

## 2017-01-10 RX ADMIN — HYDROMORPHONE HYDROCHLORIDE 4 MILLIGRAM(S): 2 INJECTION INTRAMUSCULAR; INTRAVENOUS; SUBCUTANEOUS at 10:58

## 2017-01-10 RX ADMIN — Medication 2: at 12:47

## 2017-01-10 RX ADMIN — HYDROMORPHONE HYDROCHLORIDE 4 MILLIGRAM(S): 2 INJECTION INTRAMUSCULAR; INTRAVENOUS; SUBCUTANEOUS at 17:45

## 2017-01-10 NOTE — DIETITIAN INITIAL EVALUATION ADULT. - ENERGY NEEDS
5'7",  wt = 111kg (1/7/17), BMI = 38.2, IBW = 62kg, 180% IBW, UBW = 137kg, 45 %UBW 5'7",  wt = 111kg (1/7/17), BMI = 38.2, IBW = 61kg, 180% IBW, UBW = 137kg, 45 %UBW

## 2017-01-10 NOTE — PROGRESS NOTE ADULT - SUBJECTIVE AND OBJECTIVE BOX
O/N: VSS. FAUSTINO  1/9: Adonis diet. Added dilaudid and abd binder for pain control. WBC uptrending.    STATUS POST:  Ventral hernia repair with mesh     POD3 O/N: VSS. FAUSTINO  1/9: Adonis diet. Added dilaudid and abd binder for pain control. WBC uptrending.    STATUS POST:  Ventral hernia repair with mesh     POD3     SUBJECTIVE: Denies nausea, vomiting. Reports pain limiting her willingness to move. Not OOBA. Abdominal binder helped with pain, but pt decided to remove it and now reporting increased pain. Tolerating PO. Does not want to use IS because it causes her to cough which is painful    heparin  Injectable 5000Unit(s) SubCutaneous every 12 hours  metoprolol succinate ER 100milliGRAM(s) Oral daily      Vital Signs Last 24 Hrs  T(C): 36.9, Max: 36.9 (01-09 @ 16:57)  T(F): 98.4, Max: 98.5 (01-09 @ 16:57)  HR: 85 (85 - 90)  BP: 135/73 (129/59 - 156/93)  BP(mean): --  RR: 16 (15 - 16)  SpO2: 98% (98% - 99%)  I&O's Detail      General: NAD, resting comfortably in bed  Pulm: Nonlabored breathing, no respiratory distress  Abd: soft, NT/ND. Incision CDI with staples. Incision moist, in fold of pannus. ABBIE serosanguinous  Extrem: WWP, no edema,        LABS:                        13.8   15.4  )-----------( 469      ( 09 Jan 2017 07:00 )             43.1     09 Jan 2017 07:00    138    |  101    |  8      ----------------------------<  160    3.7     |  24     |  0.88     Ca    8.7        09 Jan 2017 07:00  Phos  3.2       09 Jan 2017 07:00  Mg     2.3       09 Jan 2017 07:00            RADIOLOGY & ADDITIONAL STUDIES:

## 2017-01-10 NOTE — DIETITIAN INITIAL EVALUATION ADULT. - ORAL INTAKE PTA
Reports snacking on junk food- tries to limit junk food and sugar sweetened beverage consumption/poor

## 2017-01-10 NOTE — DIETITIAN INITIAL EVALUATION ADULT. - NS AS NUTRI INTERV ED CONTENT
Purpose of the nutrition education/Nutrition relationship to health/disease/Provided pt with type 2 diabetes nutrition thearpy and hypertension nutrition therapy materials Provided pt with type 2 diabetes nutrition thearpy and hypertension nutrition therapy materials; discussed wt management/Nutrition relationship to health/disease/Purpose of the nutrition education

## 2017-01-10 NOTE — DIETITIAN INITIAL EVALUATION ADULT. - OTHER INFO
Pt seen for LOS. Snacks throughout the day PTA- trying to eat healthier foods. Reports intentional wt loss 2/2 healthier eating habits. Consumes flavored water daily- does not like the taste of regular water. Last A1c of 7.2 per recent doctors visit. Reports frequent hypoglycemic and hyperglycemic episodes. Manages with Metformin 1000mg/2x day at home- frequent vomiting and diarrhea with medication use. Provided with diet education in 2007 when dx c DM. Expresses interest in consulting with endocrinologist. Reports of constipation, heartburn, and abdominal pain since surgery. Allergies- shellfish. Pt seen for LOS. Snacks throughout the day PTA- trying to eat healthier foods. Reports intentional wt loss 2/2 healthier eating habits. Suspect poor compliance to DM diet per pt's recall of diet PTA. Consumes flavored water daily- does not like the taste of regular water. Last A1c of 7.2% per pt from recent doctors visit. Reports frequent hypoglycemic and hyperglycemic episodes; unsure of cause. Pt takes Metformin 1000mg/2x day at home. C/o frequent vomiting and diarrhea with medication use. Pt provided with diet education in 2007 when dx with DM. Expresses interest in consulting with endocrinologist. C/o of current constipation, heartburn, and abdominal pain since surgery; recommend bowel regimen and encouraged pt to drink adequate fluids. Allergies- shellfish.

## 2017-01-10 NOTE — PROGRESS NOTE ADULT - ASSESSMENT
36yoF with PMH HTN, HLD, DM admitted for ventral hernia repair with mesh POD1    Regular diet   - Pain/nausea control  - DVT PPx-SQH/SCDs  OOB/A  ABBIE 36yoF with PMH HTN, HLD, DM admitted for ventral hernia repair with mesh POD3    Regular diet   - Pain/nausea control  - DVT PPx-SQH/SCDs  OOB/A  ABBIE --will f/u with attending whether to d/c home with ABBIE or remove  Abdominal binder  Incentive spirometer  Possible d/c home today

## 2017-01-10 NOTE — DIETITIAN INITIAL EVALUATION ADULT. - PERTINENT LABORATORY DATA
1/10 labs: WBC 14 (H); 1/9 labs: Glucose 160 (H) 1/10 labs: WBC 14 (H), BUN 5 (L), CO2 20 (L); 1/9 labs: Glucose 160 (H)

## 2017-01-11 ENCOUNTER — TRANSCRIPTION ENCOUNTER (OUTPATIENT)
Age: 37
End: 2017-01-11

## 2017-01-11 VITALS
TEMPERATURE: 98 F | RESPIRATION RATE: 14 BRPM | OXYGEN SATURATION: 98 % | SYSTOLIC BLOOD PRESSURE: 143 MMHG | DIASTOLIC BLOOD PRESSURE: 85 MMHG | HEART RATE: 62 BPM

## 2017-01-11 LAB
ANION GAP SERPL CALC-SCNC: 9 MMOL/L — SIGNIFICANT CHANGE UP (ref 9–16)
BUN SERPL-MCNC: 7 MG/DL — SIGNIFICANT CHANGE UP (ref 7–23)
CALCIUM SERPL-MCNC: 8.6 MG/DL — SIGNIFICANT CHANGE UP (ref 8.5–10.5)
CHLORIDE SERPL-SCNC: 102 MMOL/L — SIGNIFICANT CHANGE UP (ref 96–108)
CO2 SERPL-SCNC: 26 MMOL/L — SIGNIFICANT CHANGE UP (ref 22–31)
CREAT SERPL-MCNC: 0.69 MG/DL — SIGNIFICANT CHANGE UP (ref 0.5–1.3)
GLUCOSE SERPL-MCNC: 180 MG/DL — HIGH (ref 70–99)
HCT VFR BLD CALC: 40.3 % — SIGNIFICANT CHANGE UP (ref 34.5–45)
HGB BLD-MCNC: 13.1 G/DL — SIGNIFICANT CHANGE UP (ref 11.5–15.5)
MAGNESIUM SERPL-MCNC: 2.1 MG/DL — SIGNIFICANT CHANGE UP (ref 1.6–2.4)
MCHC RBC-ENTMCNC: 25.7 PG — LOW (ref 27–34)
MCHC RBC-ENTMCNC: 32.5 G/DL — SIGNIFICANT CHANGE UP (ref 32–36)
MCV RBC AUTO: 79 FL — LOW (ref 80–100)
PHOSPHATE SERPL-MCNC: 2.9 MG/DL — SIGNIFICANT CHANGE UP (ref 2.5–4.5)
PLATELET # BLD AUTO: 349 K/UL — SIGNIFICANT CHANGE UP (ref 150–400)
POTASSIUM SERPL-MCNC: 3.5 MMOL/L — SIGNIFICANT CHANGE UP (ref 3.5–5.3)
POTASSIUM SERPL-SCNC: 3.5 MMOL/L — SIGNIFICANT CHANGE UP (ref 3.5–5.3)
RBC # BLD: 5.1 M/UL — SIGNIFICANT CHANGE UP (ref 3.8–5.2)
RBC # FLD: 16.6 % — SIGNIFICANT CHANGE UP (ref 10.3–16.9)
SODIUM SERPL-SCNC: 137 MMOL/L — SIGNIFICANT CHANGE UP (ref 135–145)
WBC # BLD: 12 K/UL — HIGH (ref 3.8–10.5)
WBC # FLD AUTO: 12 K/UL — HIGH (ref 3.8–10.5)

## 2017-01-11 PROCEDURE — 85025 COMPLETE CBC W/AUTO DIFF WBC: CPT

## 2017-01-11 PROCEDURE — 83735 ASSAY OF MAGNESIUM: CPT

## 2017-01-11 PROCEDURE — 86900 BLOOD TYPING SEROLOGIC ABO: CPT

## 2017-01-11 PROCEDURE — 84100 ASSAY OF PHOSPHORUS: CPT

## 2017-01-11 PROCEDURE — 71045 X-RAY EXAM CHEST 1 VIEW: CPT

## 2017-01-11 PROCEDURE — 36415 COLL VENOUS BLD VENIPUNCTURE: CPT

## 2017-01-11 PROCEDURE — 84702 CHORIONIC GONADOTROPIN TEST: CPT

## 2017-01-11 PROCEDURE — 74176 CT ABD & PELVIS W/O CONTRAST: CPT

## 2017-01-11 PROCEDURE — 87086 URINE CULTURE/COLONY COUNT: CPT

## 2017-01-11 PROCEDURE — 80053 COMPREHEN METABOLIC PANEL: CPT

## 2017-01-11 PROCEDURE — C1781: CPT

## 2017-01-11 PROCEDURE — 85027 COMPLETE CBC AUTOMATED: CPT

## 2017-01-11 PROCEDURE — 80048 BASIC METABOLIC PNL TOTAL CA: CPT

## 2017-01-11 PROCEDURE — 88302 TISSUE EXAM BY PATHOLOGIST: CPT

## 2017-01-11 PROCEDURE — 81003 URINALYSIS AUTO W/O SCOPE: CPT

## 2017-01-11 PROCEDURE — 83605 ASSAY OF LACTIC ACID: CPT

## 2017-01-11 PROCEDURE — 81001 URINALYSIS AUTO W/SCOPE: CPT

## 2017-01-11 PROCEDURE — 86901 BLOOD TYPING SEROLOGIC RH(D): CPT

## 2017-01-11 PROCEDURE — 81025 URINE PREGNANCY TEST: CPT

## 2017-01-11 PROCEDURE — 86850 RBC ANTIBODY SCREEN: CPT

## 2017-01-11 PROCEDURE — 85610 PROTHROMBIN TIME: CPT

## 2017-01-11 PROCEDURE — 85730 THROMBOPLASTIN TIME PARTIAL: CPT

## 2017-01-11 RX ORDER — POTASSIUM CHLORIDE 20 MEQ
20 PACKET (EA) ORAL ONCE
Qty: 0 | Refills: 0 | Status: DISCONTINUED | OUTPATIENT
Start: 2017-01-11 | End: 2017-01-11

## 2017-01-11 RX ORDER — POTASSIUM CHLORIDE 20 MEQ
20 PACKET (EA) ORAL ONCE
Qty: 0 | Refills: 0 | Status: COMPLETED | OUTPATIENT
Start: 2017-01-11 | End: 2017-01-11

## 2017-01-11 RX ORDER — ACETAMINOPHEN 500 MG
650 TABLET ORAL ONCE
Qty: 0 | Refills: 0 | Status: COMPLETED | OUTPATIENT
Start: 2017-01-11 | End: 2017-01-11

## 2017-01-11 RX ORDER — HYDROMORPHONE HYDROCHLORIDE 2 MG/ML
1 INJECTION INTRAMUSCULAR; INTRAVENOUS; SUBCUTANEOUS
Qty: 25 | Refills: 0 | OUTPATIENT
Start: 2017-01-11

## 2017-01-11 RX ADMIN — HYDROMORPHONE HYDROCHLORIDE 4 MILLIGRAM(S): 2 INJECTION INTRAMUSCULAR; INTRAVENOUS; SUBCUTANEOUS at 07:00

## 2017-01-11 RX ADMIN — Medication 20 MILLIEQUIVALENT(S): at 10:48

## 2017-01-11 RX ADMIN — Medication 650 MILLIGRAM(S): at 10:50

## 2017-01-11 RX ADMIN — HYDROMORPHONE HYDROCHLORIDE 4 MILLIGRAM(S): 2 INJECTION INTRAMUSCULAR; INTRAVENOUS; SUBCUTANEOUS at 06:00

## 2017-01-11 RX ADMIN — ONDANSETRON 4 MILLIGRAM(S): 8 TABLET, FILM COATED ORAL at 10:45

## 2017-01-11 RX ADMIN — HEPARIN SODIUM 5000 UNIT(S): 5000 INJECTION INTRAVENOUS; SUBCUTANEOUS at 05:59

## 2017-01-11 RX ADMIN — Medication 650 MILLIGRAM(S): at 09:50

## 2017-01-11 RX ADMIN — HYDROMORPHONE HYDROCHLORIDE 4 MILLIGRAM(S): 2 INJECTION INTRAMUSCULAR; INTRAVENOUS; SUBCUTANEOUS at 11:21

## 2017-01-11 RX ADMIN — Medication 100 MILLIGRAM(S): at 06:00

## 2017-01-11 NOTE — DISCHARGE NOTE ADULT - CARE PROVIDER_API CALL
Hema Hurtado), Surgery  [address] [place] [address]  Phone: [phone]  Fax: [phone]

## 2017-01-11 NOTE — DISCHARGE NOTE ADULT - HOSPITAL COURSE
35yo w/Htn, Hld, DM, morbid obesity, h/o csection x 2 and known lower midline ventral hernia presents with 1d of acutely worsening pain associated with ventral hernia.  Patient states that the pain started on Patricia alexia roughly two weeks ago when she coughed forcefully while sitting in a strange position.  At that time she felt a "tearing" and then a bulge in the middle of her Csection scar.  Since that day, she says that the bulge has been bothering her. Admission CT A/P was done that revealed large small bowel containing ventral hernia along the anterior pelvic   wall w/o obstruction or strangulation. On 1/7,  pt underwent a ventral hernia repair w/ mesh that was uncomplicated. Post-op course was complicate by pain control. Diet was advanced as tolerated. At the time of discharge, pt's vital signs are stable and labs all WNL.

## 2017-01-11 NOTE — DISCHARGE NOTE ADULT - PLAN OF CARE
pain control Continue a regular diet.  Do not engage in any strenuous physical activity for 4-6 weeks. Do not lift >20lbs.  You will be discharged on oral pain medication, take as needed.  Follow up with Dr. Hurtado in 1-2 weeks. Call the office to schedule an appointment.  Return to the ED for any worsening abdominal pain, fever>101F/chills, nausea/vomiting, shortness of breath, or chest pain.

## 2017-01-11 NOTE — DISCHARGE NOTE ADULT - CARE PLAN
Principal Discharge DX:	Ventral hernia without obstruction or gangrene  Goal:	pain control  Instructions for follow-up, activity and diet:	Continue a regular diet.  Do not engage in any strenuous physical activity for 4-6 weeks. Do not lift >20lbs.  You will be discharged on oral pain medication, take as needed.  Follow up with Dr. Hurtado in 1-2 weeks. Call the office to schedule an appointment.  Return to the ED for any worsening abdominal pain, fever>101F/chills, nausea/vomiting, shortness of breath, or chest pain.

## 2017-01-11 NOTE — PROGRESS NOTE ADULT - ASSESSMENT
36yoF with PMH HTN, HLD, DM admitted for ventral hernia repair with mesh POD3    Regular diet   - Pain/nausea control  - DVT PPx-SQH/SCDs  OOB/A  ABBIE --will f/u with attending whether to d/c home with ABBIE or remove  Abdominal binder  Incentive spirometer  Possible d/c home today

## 2017-01-11 NOTE — PROGRESS NOTE ADULT - SUBJECTIVE AND OBJECTIVE BOX
O/N: FAUSTINO. VSS  1/10: Pt with poor pain tolerance. OOBC, using IS, using abd binder.      STATUS POST:  Ventral hernia repair with mesh     POD3 O/N: FAUSTINO. VSS  1/10: Pt with poor pain tolerance. OOBC, using IS, using abd binder.      STATUS POST:  Ventral hernia repair with mesh     POD3     SUBJECTIVE:  Flatus: [ x] YES [ ] NO             Bowel Movement: [x ] YES [ ] NO  Pain (0-10):            Pain Control Adequate: [x ] YES [ ] NO  Nausea: [ ] YES [x ] NO            Vomiting: [ ] YES [ x] NO  Diarrhea: [ ] YES [x ] NO         Constipation: [ ] YES [x ] NO     Chest Pain: [ ] YES [ x] NO    SOB:  [ ] YES [ x] NO    MEDICATIONS  (STANDING):  heparin  Injectable 5000Unit(s) SubCutaneous every 12 hours  insulin lispro (HumaLOG) corrective regimen sliding scale  SubCutaneous three times a day before meals  dextrose 50% Injectable 25Gram(s) IV Push once  metoprolol succinate ER 100milliGRAM(s) Oral daily    MEDICATIONS  (PRN):  ondansetron Injectable 4milliGRAM(s) IV Push every 6 hours PRN Nausea  oxyCODONE  5 mG/acetaminophen 325 mG 1Tablet(s) Oral every 4 hours PRN Moderate Pain (4 - 6)  HYDROmorphone   Tablet 4milliGRAM(s) Oral every 4 hours PRN Severe Pain (7 - 10)  ALBUTerol    90 MICROgram(s) HFA Inhaler 2Puff(s) Inhalation every 6 hours PRN Shortness of Breath and/or Wheezing      Vital Signs Last 24 Hrs  T(C): 36.5, Max: 37 (-10 @ 21:00)  T(F): 97.7, Max: 98.6 (-10 @ 21:00)  HR: 81 (77 - 84)  BP: 142/71 (128/87 - 145/74)  BP(mean): --  RR: 16 (15 - 16)  SpO2: 95% (95% - 99%)    PHYSICAL EXAM:      Constitutional: A&Ox3    Respiratory: non labored breathing, no respiratory distress    Cardiovascular: NSR, RRR    Gastrointestinal: soft, non distended, + TTP to incision site; ABBIE drain- S/S                 Incision: CDI    Genitourinary: voiding     Extremities: (-) edema        I&O's Detail      LABS:                        14.1   14.0  )-----------( 389      ( 10 Gene 2017 07:58 )             42.8     10 Gene 2017 07:58    137    |  103    |  5      ----------------------------<  145    3.9     |  20     |  0.63     Ca    9.0        10 Gene 2017 07:58  Phos  3.3       10 Gene 2017 07:58  Mg     2.3       10 Gene 2017 07:58        Urinalysis Basic - ( 10 Gene 2017 14:23 )    Color: Yellow / Appearance: Clear / S.025 / pH: x  Gluc: x / Ketone: NEGATIVE  / Bili: NEGATIVE / Urobili: 0.2 E.U./dL   Blood: x / Protein: NEGATIVE mg/dL / Nitrite: NEGATIVE   Leuk Esterase: Trace / RBC: < 5 /HPF / WBC 5-10 /HPF   Sq Epi: x / Non Sq Epi: Many /HPF / Bacteria: Present /HPF        RADIOLOGY & ADDITIONAL STUDIES:

## 2017-01-11 NOTE — DISCHARGE NOTE ADULT - MEDICATION SUMMARY - MEDICATIONS TO TAKE
I will START or STAY ON the medications listed below when I get home from the hospital:    HYDROmorphone 4 mg oral tablet  -- 1 tab(s) by mouth every 4 hours, As needed, Severe Pain (7 - 10) MDD:6  -- Indication: For pain    metFORMIN 1000 mg oral tablet  -- 1 tab(s) by mouth 2 times a day  -- Indication: For DM    metoprolol succinate 100 mg oral tablet, extended release  -- 1 tab(s) by mouth once a day  -- Indication: For HTN    albuterol  --  inhaled   -- Indication: For Asthma

## 2017-01-11 NOTE — DISCHARGE NOTE ADULT - MEDICATION SUMMARY - MEDICATIONS TO STOP TAKING
I will STOP taking the medications listed below when I get home from the hospital:    ibuprofen 800 mg oral tablet  -- 1 tab(s) by mouth every 8 hours prn pain  -- Do not take this drug if you are pregnant.  It is very important that you take or use this exactly as directed.  Do not skip doses or discontinue unless directed by your doctor.  May cause drowsiness or dizziness.  Obtain medical advice before taking any non-prescription drugs as some may affect the action of this medication.  Take with food or milk.    Diflucan 150 mg oral tablet  -- 1 tab(s) by mouth once. To be taken in 3 days on12/15  -- Do not take this drug if you are pregnant.  Finish all this medication unless otherwise directed by prescriber.    nitrofurantoin macrocrystals 100 mg oral capsule  -- 1 cap(s) by mouth every 12 hours  -- Finish all this medication unless otherwise directed by prescriber.  May discolor urine or feces.  Take with food or milk.

## 2017-01-14 ENCOUNTER — EMERGENCY (EMERGENCY)
Facility: HOSPITAL | Age: 37
LOS: 1 days | Discharge: HOME | End: 2017-01-14
Attending: EMERGENCY MEDICINE | Admitting: EMERGENCY MEDICINE
Payer: COMMERCIAL

## 2017-01-14 VITALS
SYSTOLIC BLOOD PRESSURE: 171 MMHG | OXYGEN SATURATION: 98 % | TEMPERATURE: 99 F | DIASTOLIC BLOOD PRESSURE: 104 MMHG | WEIGHT: 240.08 LBS | RESPIRATION RATE: 18 BRPM | HEART RATE: 99 BPM

## 2017-01-14 VITALS
RESPIRATION RATE: 18 BRPM | OXYGEN SATURATION: 100 % | TEMPERATURE: 98 F | DIASTOLIC BLOOD PRESSURE: 95 MMHG | HEART RATE: 90 BPM | SYSTOLIC BLOOD PRESSURE: 147 MMHG

## 2017-01-14 DIAGNOSIS — Z98.89 OTHER SPECIFIED POSTPROCEDURAL STATES: Chronic | ICD-10-CM

## 2017-01-14 DIAGNOSIS — Z98.890 OTHER SPECIFIED POSTPROCEDURAL STATES: Chronic | ICD-10-CM

## 2017-01-14 LAB
ALBUMIN SERPL ELPH-MCNC: 3.2 G/DL — LOW (ref 3.4–5)
ALP SERPL-CCNC: 114 U/L — SIGNIFICANT CHANGE UP (ref 40–120)
ALT FLD-CCNC: 22 U/L — SIGNIFICANT CHANGE UP (ref 12–42)
ANION GAP SERPL CALC-SCNC: 8 MMOL/L — LOW (ref 9–16)
APPEARANCE UR: CLEAR — SIGNIFICANT CHANGE UP
APTT BLD: 34.7 SEC — SIGNIFICANT CHANGE UP (ref 27.5–37.4)
AST SERPL-CCNC: 12 U/L — LOW (ref 15–37)
BASOPHILS NFR BLD AUTO: 0.3 % — SIGNIFICANT CHANGE UP (ref 0–2)
BILIRUB SERPL-MCNC: 0.3 MG/DL — SIGNIFICANT CHANGE UP (ref 0.2–1.2)
BILIRUB UR-MCNC: NEGATIVE — SIGNIFICANT CHANGE UP
BUN SERPL-MCNC: 8 MG/DL — SIGNIFICANT CHANGE UP (ref 7–23)
CALCIUM SERPL-MCNC: 8.8 MG/DL — SIGNIFICANT CHANGE UP (ref 8.5–10.5)
CHLORIDE SERPL-SCNC: 104 MMOL/L — SIGNIFICANT CHANGE UP (ref 96–108)
CO2 SERPL-SCNC: 27 MMOL/L — SIGNIFICANT CHANGE UP (ref 22–31)
COLOR SPEC: YELLOW — SIGNIFICANT CHANGE UP
CREAT SERPL-MCNC: 0.78 MG/DL — SIGNIFICANT CHANGE UP (ref 0.5–1.3)
DIFF PNL FLD: (no result)
EOSINOPHIL NFR BLD AUTO: 1.4 % — SIGNIFICANT CHANGE UP (ref 0–6)
GLUCOSE SERPL-MCNC: 129 MG/DL — HIGH (ref 70–99)
GLUCOSE UR QL: NEGATIVE — SIGNIFICANT CHANGE UP
HCG SERPL-ACNC: <1 MIU/ML — SIGNIFICANT CHANGE UP
HCT VFR BLD CALC: 40.2 % — SIGNIFICANT CHANGE UP (ref 34.5–45)
HGB BLD-MCNC: 13.1 G/DL — SIGNIFICANT CHANGE UP (ref 11.5–15.5)
INR BLD: 1.19 — HIGH (ref 0.88–1.16)
KETONES UR-MCNC: NEGATIVE — SIGNIFICANT CHANGE UP
LACTATE SERPL-SCNC: 2 MMOL/L — SIGNIFICANT CHANGE UP (ref 0.5–2)
LEUKOCYTE ESTERASE UR-ACNC: (no result)
LIDOCAIN IGE QN: 102 U/L — SIGNIFICANT CHANGE UP (ref 73–393)
LYMPHOCYTES # BLD AUTO: 23.4 % — SIGNIFICANT CHANGE UP (ref 13–44)
MCHC RBC-ENTMCNC: 25.6 PG — LOW (ref 27–34)
MCHC RBC-ENTMCNC: 32.6 G/DL — SIGNIFICANT CHANGE UP (ref 32–36)
MCV RBC AUTO: 78.5 FL — LOW (ref 80–100)
MONOCYTES NFR BLD AUTO: 7.6 % — SIGNIFICANT CHANGE UP (ref 2–14)
NEUTROPHILS NFR BLD AUTO: 67.3 % — SIGNIFICANT CHANGE UP (ref 43–77)
NITRITE UR-MCNC: NEGATIVE — SIGNIFICANT CHANGE UP
PH UR: 6 — SIGNIFICANT CHANGE UP (ref 4–8)
PLATELET # BLD AUTO: 405 K/UL — HIGH (ref 150–400)
POTASSIUM SERPL-MCNC: 4.1 MMOL/L — SIGNIFICANT CHANGE UP (ref 3.5–5.3)
POTASSIUM SERPL-SCNC: 4.1 MMOL/L — SIGNIFICANT CHANGE UP (ref 3.5–5.3)
PROT SERPL-MCNC: 7.9 G/DL — SIGNIFICANT CHANGE UP (ref 6.4–8.2)
PROT UR-MCNC: NEGATIVE MG/DL — SIGNIFICANT CHANGE UP
PROTHROM AB SERPL-ACNC: 13.2 SEC — HIGH (ref 10–13.1)
RBC # BLD: 5.12 M/UL — SIGNIFICANT CHANGE UP (ref 3.8–5.2)
RBC # FLD: 16.6 % — SIGNIFICANT CHANGE UP (ref 10.3–16.9)
SODIUM SERPL-SCNC: 139 MMOL/L — SIGNIFICANT CHANGE UP (ref 135–145)
SP GR SPEC: 1.02 — SIGNIFICANT CHANGE UP (ref 1–1.03)
UROBILINOGEN FLD QL: 0.2 E.U./DL — SIGNIFICANT CHANGE UP
WBC # BLD: 16.2 K/UL — HIGH (ref 3.8–10.5)
WBC # FLD AUTO: 16.2 K/UL — HIGH (ref 3.8–10.5)

## 2017-01-14 PROCEDURE — 83605 ASSAY OF LACTIC ACID: CPT

## 2017-01-14 PROCEDURE — 74022 RADEX COMPL AQT ABD SERIES: CPT | Mod: 26

## 2017-01-14 PROCEDURE — 85025 COMPLETE CBC W/AUTO DIFF WBC: CPT

## 2017-01-14 PROCEDURE — 85730 THROMBOPLASTIN TIME PARTIAL: CPT

## 2017-01-14 PROCEDURE — 74022 RADEX COMPL AQT ABD SERIES: CPT

## 2017-01-14 PROCEDURE — 99284 EMERGENCY DEPT VISIT MOD MDM: CPT | Mod: 25

## 2017-01-14 PROCEDURE — 80053 COMPREHEN METABOLIC PANEL: CPT

## 2017-01-14 PROCEDURE — 99284 EMERGENCY DEPT VISIT MOD MDM: CPT

## 2017-01-14 PROCEDURE — 84702 CHORIONIC GONADOTROPIN TEST: CPT

## 2017-01-14 PROCEDURE — 83690 ASSAY OF LIPASE: CPT

## 2017-01-14 PROCEDURE — 96375 TX/PRO/DX INJ NEW DRUG ADDON: CPT

## 2017-01-14 PROCEDURE — 87086 URINE CULTURE/COLONY COUNT: CPT

## 2017-01-14 PROCEDURE — 81001 URINALYSIS AUTO W/SCOPE: CPT

## 2017-01-14 PROCEDURE — 36415 COLL VENOUS BLD VENIPUNCTURE: CPT

## 2017-01-14 PROCEDURE — 85610 PROTHROMBIN TIME: CPT

## 2017-01-14 PROCEDURE — 81003 URINALYSIS AUTO W/O SCOPE: CPT

## 2017-01-14 PROCEDURE — 96374 THER/PROPH/DIAG INJ IV PUSH: CPT

## 2017-01-14 RX ORDER — POLYETHYLENE GLYCOL 3350 17 G/17G
17 POWDER, FOR SOLUTION ORAL
Qty: 119 | Refills: 0 | OUTPATIENT
Start: 2017-01-14 | End: 2017-01-21

## 2017-01-14 RX ORDER — MORPHINE SULFATE 50 MG/1
4 CAPSULE, EXTENDED RELEASE ORAL ONCE
Qty: 0 | Refills: 0 | Status: DISCONTINUED | OUTPATIENT
Start: 2017-01-14 | End: 2017-01-14

## 2017-01-14 RX ORDER — CIPROFLOXACIN LACTATE 400MG/40ML
500 VIAL (ML) INTRAVENOUS ONCE
Qty: 0 | Refills: 0 | Status: COMPLETED | OUTPATIENT
Start: 2017-01-14 | End: 2017-01-14

## 2017-01-14 RX ORDER — ONDANSETRON 8 MG/1
4 TABLET, FILM COATED ORAL ONCE
Qty: 0 | Refills: 0 | Status: COMPLETED | OUTPATIENT
Start: 2017-01-14 | End: 2017-01-14

## 2017-01-14 RX ORDER — HYDROMORPHONE HYDROCHLORIDE 2 MG/ML
1 INJECTION INTRAMUSCULAR; INTRAVENOUS; SUBCUTANEOUS
Qty: 12 | Refills: 0 | OUTPATIENT
Start: 2017-01-14

## 2017-01-14 RX ORDER — SODIUM CHLORIDE 9 MG/ML
1000 INJECTION INTRAMUSCULAR; INTRAVENOUS; SUBCUTANEOUS ONCE
Qty: 0 | Refills: 0 | Status: COMPLETED | OUTPATIENT
Start: 2017-01-14 | End: 2017-01-14

## 2017-01-14 RX ADMIN — SODIUM CHLORIDE 1000 MILLILITER(S): 9 INJECTION INTRAMUSCULAR; INTRAVENOUS; SUBCUTANEOUS at 17:42

## 2017-01-14 RX ADMIN — Medication 500 MILLIGRAM(S): at 18:57

## 2017-01-14 RX ADMIN — MORPHINE SULFATE 4 MILLIGRAM(S): 50 CAPSULE, EXTENDED RELEASE ORAL at 17:41

## 2017-01-14 RX ADMIN — ONDANSETRON 4 MILLIGRAM(S): 8 TABLET, FILM COATED ORAL at 18:06

## 2017-01-14 RX ADMIN — MORPHINE SULFATE 4 MILLIGRAM(S): 50 CAPSULE, EXTENDED RELEASE ORAL at 18:06

## 2017-01-14 NOTE — ED PROVIDER NOTE - OBJECTIVE STATEMENT
36F with h/o HLD, DM, morbid obesity, csection x 2, recent repair of ventral hernia with mesh on 1/8/17 who p/w pain and constipation. Pt states that since DC from hospital, she has not been able to fill her Rx for pain meds and has only been taking Tylenol for her abdominal pain, which is not working. She states her pharmacy did not have the medication and it won't be available for 2 more days. Pain is located over her incision, no bleeding or DC, no redness. Pt also c/o dysuria. No f/c, no n/v. +bloating, but passing gas. ABBIE drain draining serosanguinous fluid.

## 2017-01-14 NOTE — ED ADULT TRIAGE NOTE - CHIEF COMPLAINT QUOTE
Patient reports having surgery on Wednesday for hernia, intestinal problems.  ABBIE bulb seen draining sanguinous fluids.  Severe abdominal pain, dressing intact to lower pelvic area.  Squirmish, cursing, walking slowly, guarding, tender.   Soft abdomen.

## 2017-01-14 NOTE — ED ADULT NURSE NOTE - OBJECTIVE STATEMENT
pt post operative ventral hernia repair ,released from [facility] on Wednesday ,having a lot of lower abdominal pain ,has been taking tylenol

## 2017-01-14 NOTE — ED PROVIDER NOTE - PROGRESS NOTE DETAILS
Pt feels better, XR shows nonobstructive gas pattern. Pt passing gas. Will DC home with Rx for cipro for UTI, pain med rx to new pharmacy to get her until tuesday when she can fill other rx. Miralax for constipation. Pt eager to go home, Stable for DC>

## 2017-01-14 NOTE — ED PROVIDER NOTE - GASTROINTESTINAL, MLM
Abdomen soft, obese, TTP over incision, no r/r/g, +BS. no cva t b/l. ABBIE drain in place draining serosanguinous fluid.

## 2017-01-14 NOTE — ED ADULT NURSE REASSESSMENT NOTE - NS ED NURSE REASSESS COMMENT FT1
patient states ready to leave, remains in pain 7/10, states will go  prescribed pain medication and f/u as scheduled with Surgeon. Patient insistent upon being discharged despite still being in pain. Patient able to self ambulate with a steady gait

## 2017-01-14 NOTE — ED PROVIDER NOTE - MEDICAL DECISION MAKING DETAILS
36F with above pMHX including ventral hernia repair with mesh by Dr. Hurtado on 1/8/17 with abdominal pain, has not been able to fill pain med rx due to pharmacy not having it in stock. Pt with TTp over incision, otherwise nonsurgical abd exam, incision appears to be healing well. Plan: Labs, IVFs, UA, pain control, Abd xr. Dispo pending w/u. Pt has surgery f/u appt next week.

## 2017-01-15 DIAGNOSIS — Z88.0 ALLERGY STATUS TO PENICILLIN: ICD-10-CM

## 2017-01-15 DIAGNOSIS — E11.9 TYPE 2 DIABETES MELLITUS WITHOUT COMPLICATIONS: ICD-10-CM

## 2017-01-15 DIAGNOSIS — I10 ESSENTIAL (PRIMARY) HYPERTENSION: ICD-10-CM

## 2017-01-15 DIAGNOSIS — K43.9 VENTRAL HERNIA WITHOUT OBSTRUCTION OR GANGRENE: ICD-10-CM

## 2017-01-15 DIAGNOSIS — Z79.51 LONG TERM (CURRENT) USE OF INHALED STEROIDS: ICD-10-CM

## 2017-01-15 DIAGNOSIS — E78.5 HYPERLIPIDEMIA, UNSPECIFIED: ICD-10-CM

## 2017-01-15 DIAGNOSIS — E66.01 MORBID (SEVERE) OBESITY DUE TO EXCESS CALORIES: ICD-10-CM

## 2017-01-15 DIAGNOSIS — K43.2 INCISIONAL HERNIA WITHOUT OBSTRUCTION OR GANGRENE: ICD-10-CM

## 2017-01-15 DIAGNOSIS — Z91.013 ALLERGY TO SEAFOOD: ICD-10-CM

## 2017-01-15 DIAGNOSIS — Z79.84 LONG TERM (CURRENT) USE OF ORAL HYPOGLYCEMIC DRUGS: ICD-10-CM

## 2017-01-15 DIAGNOSIS — Z88.1 ALLERGY STATUS TO OTHER ANTIBIOTIC AGENTS STATUS: ICD-10-CM

## 2017-01-15 LAB
CULTURE RESULTS: NO GROWTH — SIGNIFICANT CHANGE UP
SPECIMEN SOURCE: SIGNIFICANT CHANGE UP

## 2017-01-15 RX ORDER — MOXIFLOXACIN HYDROCHLORIDE TABLETS, 400 MG 400 MG/1
1 TABLET, FILM COATED ORAL
Qty: 13 | Refills: 0 | OUTPATIENT
Start: 2017-01-15 | End: 2017-01-22

## 2017-01-17 LAB — SURGICAL PATHOLOGY STUDY: SIGNIFICANT CHANGE UP

## 2017-01-18 DIAGNOSIS — E11.9 TYPE 2 DIABETES MELLITUS WITHOUT COMPLICATIONS: ICD-10-CM

## 2017-01-18 DIAGNOSIS — I10 ESSENTIAL (PRIMARY) HYPERTENSION: ICD-10-CM

## 2017-01-18 DIAGNOSIS — Z88.0 ALLERGY STATUS TO PENICILLIN: ICD-10-CM

## 2017-01-18 DIAGNOSIS — E78.5 HYPERLIPIDEMIA, UNSPECIFIED: ICD-10-CM

## 2017-01-18 DIAGNOSIS — N39.0 URINARY TRACT INFECTION, SITE NOT SPECIFIED: ICD-10-CM

## 2017-01-18 DIAGNOSIS — Z88.8 ALLERGY STATUS TO OTHER DRUGS, MEDICAMENTS AND BIOLOGICAL SUBSTANCES STATUS: ICD-10-CM

## 2017-01-18 DIAGNOSIS — G89.18 OTHER ACUTE POSTPROCEDURAL PAIN: ICD-10-CM

## 2017-01-18 DIAGNOSIS — Z79.899 OTHER LONG TERM (CURRENT) DRUG THERAPY: ICD-10-CM

## 2017-01-18 DIAGNOSIS — Z79.2 LONG TERM (CURRENT) USE OF ANTIBIOTICS: ICD-10-CM

## 2017-01-18 DIAGNOSIS — Z88.1 ALLERGY STATUS TO OTHER ANTIBIOTIC AGENTS STATUS: ICD-10-CM

## 2017-01-18 DIAGNOSIS — Z91.013 ALLERGY TO SEAFOOD: ICD-10-CM

## 2017-01-20 ENCOUNTER — INPATIENT (INPATIENT)
Facility: HOSPITAL | Age: 37
LOS: 0 days | Discharge: HOME | DRG: 863 | End: 2017-01-21
Attending: SURGERY | Admitting: SURGERY
Payer: COMMERCIAL

## 2017-01-20 VITALS
OXYGEN SATURATION: 98 % | RESPIRATION RATE: 18 BRPM | SYSTOLIC BLOOD PRESSURE: 153 MMHG | HEART RATE: 116 BPM | TEMPERATURE: 98 F | DIASTOLIC BLOOD PRESSURE: 92 MMHG

## 2017-01-20 DIAGNOSIS — Z98.89 OTHER SPECIFIED POSTPROCEDURAL STATES: Chronic | ICD-10-CM

## 2017-01-20 DIAGNOSIS — Z98.890 OTHER SPECIFIED POSTPROCEDURAL STATES: Chronic | ICD-10-CM

## 2017-01-20 PROCEDURE — 99285 EMERGENCY DEPT VISIT HI MDM: CPT | Mod: 25

## 2017-01-20 NOTE — ED ADULT TRIAGE NOTE - CHIEF COMPLAINT QUOTE
pt had a hernia repair like a week ago , pt was seen in [facility] ER on Saturday singed out AMA , pt c/o lower adnominal pain , c/o nausea , no vomiting, pt has Luis A -Ledbetter drains on , pt states " site looks infected and sutures/ staples are opening "

## 2017-01-21 ENCOUNTER — TRANSCRIPTION ENCOUNTER (OUTPATIENT)
Age: 37
End: 2017-01-21

## 2017-01-21 VITALS
SYSTOLIC BLOOD PRESSURE: 147 MMHG | DIASTOLIC BLOOD PRESSURE: 90 MMHG | HEART RATE: 88 BPM | TEMPERATURE: 99 F | OXYGEN SATURATION: 99 % | RESPIRATION RATE: 17 BRPM

## 2017-01-21 LAB
ALBUMIN SERPL ELPH-MCNC: 2.9 G/DL — LOW (ref 3.4–5)
ALP SERPL-CCNC: 114 U/L — SIGNIFICANT CHANGE UP (ref 40–120)
ALT FLD-CCNC: 21 U/L — SIGNIFICANT CHANGE UP (ref 12–42)
ANION GAP SERPL CALC-SCNC: 7 MMOL/L — LOW (ref 9–16)
ANION GAP SERPL CALC-SCNC: 7 MMOL/L — LOW (ref 9–16)
APPEARANCE UR: (no result)
AST SERPL-CCNC: 12 U/L — LOW (ref 15–37)
BASOPHILS NFR BLD AUTO: 0.3 % — SIGNIFICANT CHANGE UP (ref 0–2)
BILIRUB SERPL-MCNC: 0.2 MG/DL — SIGNIFICANT CHANGE UP (ref 0.2–1.2)
BILIRUB UR-MCNC: NEGATIVE — SIGNIFICANT CHANGE UP
BUN SERPL-MCNC: 7 MG/DL — SIGNIFICANT CHANGE UP (ref 7–23)
BUN SERPL-MCNC: 7 MG/DL — SIGNIFICANT CHANGE UP (ref 7–23)
CALCIUM SERPL-MCNC: 7.9 MG/DL — LOW (ref 8.5–10.5)
CALCIUM SERPL-MCNC: 8.7 MG/DL — SIGNIFICANT CHANGE UP (ref 8.5–10.5)
CHLORIDE SERPL-SCNC: 103 MMOL/L — SIGNIFICANT CHANGE UP (ref 96–108)
CHLORIDE SERPL-SCNC: 104 MMOL/L — SIGNIFICANT CHANGE UP (ref 96–108)
CO2 SERPL-SCNC: 28 MMOL/L — SIGNIFICANT CHANGE UP (ref 22–31)
CO2 SERPL-SCNC: 29 MMOL/L — SIGNIFICANT CHANGE UP (ref 22–31)
COLOR SPEC: YELLOW — SIGNIFICANT CHANGE UP
CREAT SERPL-MCNC: 0.66 MG/DL — SIGNIFICANT CHANGE UP (ref 0.5–1.3)
CREAT SERPL-MCNC: 0.78 MG/DL — SIGNIFICANT CHANGE UP (ref 0.5–1.3)
DIFF PNL FLD: (no result)
EOSINOPHIL NFR BLD AUTO: 1.9 % — SIGNIFICANT CHANGE UP (ref 0–6)
GLUCOSE SERPL-MCNC: 158 MG/DL — HIGH (ref 70–99)
GLUCOSE SERPL-MCNC: 165 MG/DL — HIGH (ref 70–99)
GLUCOSE UR QL: NEGATIVE — SIGNIFICANT CHANGE UP
GRAM STN FLD: SIGNIFICANT CHANGE UP
HCT VFR BLD CALC: 37 % — SIGNIFICANT CHANGE UP (ref 34.5–45)
HCT VFR BLD CALC: 37.9 % — SIGNIFICANT CHANGE UP (ref 34.5–45)
HGB BLD-MCNC: 12 G/DL — SIGNIFICANT CHANGE UP (ref 11.5–15.5)
HGB BLD-MCNC: 12.3 G/DL — SIGNIFICANT CHANGE UP (ref 11.5–15.5)
KETONES UR-MCNC: NEGATIVE — SIGNIFICANT CHANGE UP
LEUKOCYTE ESTERASE UR-ACNC: (no result)
LYMPHOCYTES # BLD AUTO: 28.1 % — SIGNIFICANT CHANGE UP (ref 13–44)
MAGNESIUM SERPL-MCNC: 2 MG/DL — SIGNIFICANT CHANGE UP (ref 1.6–2.4)
MCHC RBC-ENTMCNC: 25.5 PG — LOW (ref 27–34)
MCHC RBC-ENTMCNC: 26.1 PG — LOW (ref 27–34)
MCHC RBC-ENTMCNC: 32.4 G/DL — SIGNIFICANT CHANGE UP (ref 32–36)
MCHC RBC-ENTMCNC: 32.5 G/DL — SIGNIFICANT CHANGE UP (ref 32–36)
MCV RBC AUTO: 78.6 FL — LOW (ref 80–100)
MCV RBC AUTO: 80.4 FL — SIGNIFICANT CHANGE UP (ref 80–100)
MONOCYTES NFR BLD AUTO: 6.4 % — SIGNIFICANT CHANGE UP (ref 2–14)
NEUTROPHILS NFR BLD AUTO: 63.3 % — SIGNIFICANT CHANGE UP (ref 43–77)
NITRITE UR-MCNC: NEGATIVE — SIGNIFICANT CHANGE UP
PH UR: 5.5 — SIGNIFICANT CHANGE UP (ref 4–8)
PHOSPHATE SERPL-MCNC: 3.2 MG/DL — SIGNIFICANT CHANGE UP (ref 2.5–4.5)
PLATELET # BLD AUTO: 428 K/UL — HIGH (ref 150–400)
PLATELET # BLD AUTO: 502 K/UL — HIGH (ref 150–400)
POTASSIUM SERPL-MCNC: 3.4 MMOL/L — LOW (ref 3.5–5.3)
POTASSIUM SERPL-MCNC: 3.6 MMOL/L — SIGNIFICANT CHANGE UP (ref 3.5–5.3)
POTASSIUM SERPL-SCNC: 3.4 MMOL/L — LOW (ref 3.5–5.3)
POTASSIUM SERPL-SCNC: 3.6 MMOL/L — SIGNIFICANT CHANGE UP (ref 3.5–5.3)
PROT SERPL-MCNC: 7.4 G/DL — SIGNIFICANT CHANGE UP (ref 6.4–8.2)
PROT UR-MCNC: NEGATIVE MG/DL — SIGNIFICANT CHANGE UP
RBC # BLD: 4.6 M/UL — SIGNIFICANT CHANGE UP (ref 3.8–5.2)
RBC # BLD: 4.82 M/UL — SIGNIFICANT CHANGE UP (ref 3.8–5.2)
RBC # FLD: 16 % — SIGNIFICANT CHANGE UP (ref 10.3–16.9)
RBC # FLD: 16.2 % — SIGNIFICANT CHANGE UP (ref 10.3–16.9)
SODIUM SERPL-SCNC: 139 MMOL/L — SIGNIFICANT CHANGE UP (ref 135–145)
SODIUM SERPL-SCNC: 139 MMOL/L — SIGNIFICANT CHANGE UP (ref 135–145)
SP GR SPEC: 1.02 — SIGNIFICANT CHANGE UP (ref 1–1.03)
SPECIMEN SOURCE: SIGNIFICANT CHANGE UP
UROBILINOGEN FLD QL: 0.2 E.U./DL — SIGNIFICANT CHANGE UP
WBC # BLD: 10.9 K/UL — HIGH (ref 3.8–10.5)
WBC # BLD: 11 K/UL — HIGH (ref 3.8–10.5)
WBC # FLD AUTO: 10.9 K/UL — HIGH (ref 3.8–10.5)
WBC # FLD AUTO: 11 K/UL — HIGH (ref 3.8–10.5)

## 2017-01-21 PROCEDURE — 81001 URINALYSIS AUTO W/SCOPE: CPT

## 2017-01-21 PROCEDURE — 87075 CULTR BACTERIA EXCEPT BLOOD: CPT

## 2017-01-21 PROCEDURE — 74176 CT ABD & PELVIS W/O CONTRAST: CPT | Mod: 26

## 2017-01-21 PROCEDURE — 87040 BLOOD CULTURE FOR BACTERIA: CPT

## 2017-01-21 PROCEDURE — 81003 URINALYSIS AUTO W/O SCOPE: CPT

## 2017-01-21 PROCEDURE — 85025 COMPLETE CBC W/AUTO DIFF WBC: CPT

## 2017-01-21 PROCEDURE — 96374 THER/PROPH/DIAG INJ IV PUSH: CPT

## 2017-01-21 PROCEDURE — 87086 URINE CULTURE/COLONY COUNT: CPT

## 2017-01-21 PROCEDURE — 80053 COMPREHEN METABOLIC PANEL: CPT

## 2017-01-21 PROCEDURE — 85027 COMPLETE CBC AUTOMATED: CPT

## 2017-01-21 PROCEDURE — 87186 SC STD MICRODIL/AGAR DIL: CPT

## 2017-01-21 PROCEDURE — 99285 EMERGENCY DEPT VISIT HI MDM: CPT | Mod: 25

## 2017-01-21 PROCEDURE — 74176 CT ABD & PELVIS W/O CONTRAST: CPT

## 2017-01-21 PROCEDURE — 83735 ASSAY OF MAGNESIUM: CPT

## 2017-01-21 PROCEDURE — 80048 BASIC METABOLIC PNL TOTAL CA: CPT

## 2017-01-21 PROCEDURE — 36415 COLL VENOUS BLD VENIPUNCTURE: CPT

## 2017-01-21 PROCEDURE — 87070 CULTURE OTHR SPECIMN AEROBIC: CPT

## 2017-01-21 PROCEDURE — 84100 ASSAY OF PHOSPHORUS: CPT

## 2017-01-21 RX ORDER — VANCOMYCIN HCL 1 G
1000 VIAL (EA) INTRAVENOUS EVERY 12 HOURS
Qty: 0 | Refills: 0 | Status: DISCONTINUED | OUTPATIENT
Start: 2017-01-21 | End: 2017-01-21

## 2017-01-21 RX ORDER — VANCOMYCIN HCL 1 G
1000 VIAL (EA) INTRAVENOUS ONCE
Qty: 0 | Refills: 0 | Status: COMPLETED | OUTPATIENT
Start: 2017-01-21 | End: 2017-01-21

## 2017-01-21 RX ORDER — MORPHINE SULFATE 50 MG/1
4 CAPSULE, EXTENDED RELEASE ORAL ONCE
Qty: 0 | Refills: 0 | Status: DISCONTINUED | OUTPATIENT
Start: 2017-01-21 | End: 2017-01-21

## 2017-01-21 RX ORDER — NYSTATIN CREAM 100000 [USP'U]/G
1 CREAM TOPICAL
Qty: 1 | Refills: 0 | OUTPATIENT
Start: 2017-01-21

## 2017-01-21 RX ORDER — NYSTATIN CREAM 100000 [USP'U]/G
1 CREAM TOPICAL
Qty: 0 | Refills: 0 | Status: DISCONTINUED | OUTPATIENT
Start: 2017-01-21 | End: 2017-01-21

## 2017-01-21 RX ORDER — GLUCAGON INJECTION, SOLUTION 0.5 MG/.1ML
1 INJECTION, SOLUTION SUBCUTANEOUS ONCE
Qty: 0 | Refills: 0 | Status: DISCONTINUED | OUTPATIENT
Start: 2017-01-21 | End: 2017-01-21

## 2017-01-21 RX ORDER — POTASSIUM CHLORIDE 20 MEQ
40 PACKET (EA) ORAL ONCE
Qty: 0 | Refills: 0 | Status: COMPLETED | OUTPATIENT
Start: 2017-01-21 | End: 2017-01-21

## 2017-01-21 RX ORDER — DEXTROSE 50 % IN WATER 50 %
12.5 SYRINGE (ML) INTRAVENOUS ONCE
Qty: 0 | Refills: 0 | Status: DISCONTINUED | OUTPATIENT
Start: 2017-01-21 | End: 2017-01-21

## 2017-01-21 RX ORDER — DEXTROSE 50 % IN WATER 50 %
1 SYRINGE (ML) INTRAVENOUS ONCE
Qty: 0 | Refills: 0 | Status: DISCONTINUED | OUTPATIENT
Start: 2017-01-21 | End: 2017-01-21

## 2017-01-21 RX ORDER — BACITRACIN ZINC 500 UNIT/G
1 OINTMENT IN PACKET (EA) TOPICAL
Qty: 1 | Refills: 0 | OUTPATIENT
Start: 2017-01-21

## 2017-01-21 RX ORDER — ONDANSETRON 8 MG/1
4 TABLET, FILM COATED ORAL EVERY 6 HOURS
Qty: 0 | Refills: 0 | Status: DISCONTINUED | OUTPATIENT
Start: 2017-01-21 | End: 2017-01-21

## 2017-01-21 RX ORDER — BACITRACIN ZINC 500 UNIT/G
1 OINTMENT IN PACKET (EA) TOPICAL
Qty: 0 | Refills: 0 | Status: DISCONTINUED | OUTPATIENT
Start: 2017-01-21 | End: 2017-01-21

## 2017-01-21 RX ORDER — INSULIN LISPRO 100/ML
VIAL (ML) SUBCUTANEOUS
Qty: 0 | Refills: 0 | Status: DISCONTINUED | OUTPATIENT
Start: 2017-01-21 | End: 2017-01-21

## 2017-01-21 RX ORDER — HEPARIN SODIUM 5000 [USP'U]/ML
7500 INJECTION INTRAVENOUS; SUBCUTANEOUS EVERY 8 HOURS
Qty: 0 | Refills: 0 | Status: DISCONTINUED | OUTPATIENT
Start: 2017-01-21 | End: 2017-01-21

## 2017-01-21 RX ORDER — POTASSIUM CHLORIDE 20 MEQ
40 PACKET (EA) ORAL ONCE
Qty: 0 | Refills: 0 | Status: DISCONTINUED | OUTPATIENT
Start: 2017-01-21 | End: 2017-01-21

## 2017-01-21 RX ORDER — DIPHENHYDRAMINE HCL 50 MG
50 CAPSULE ORAL ONCE
Qty: 0 | Refills: 0 | Status: COMPLETED | OUTPATIENT
Start: 2017-01-21 | End: 2017-01-21

## 2017-01-21 RX ORDER — SODIUM CHLORIDE 9 MG/ML
1000 INJECTION, SOLUTION INTRAVENOUS
Qty: 0 | Refills: 0 | Status: DISCONTINUED | OUTPATIENT
Start: 2017-01-21 | End: 2017-01-21

## 2017-01-21 RX ADMIN — HEPARIN SODIUM 7500 UNIT(S): 5000 INJECTION INTRAVENOUS; SUBCUTANEOUS at 08:31

## 2017-01-21 RX ADMIN — Medication 50 MILLIGRAM(S): at 10:44

## 2017-01-21 RX ADMIN — Medication 100 MILLIGRAM(S): at 14:20

## 2017-01-21 RX ADMIN — Medication 100 MILLIGRAM(S): at 06:32

## 2017-01-21 RX ADMIN — HEPARIN SODIUM 7500 UNIT(S): 5000 INJECTION INTRAVENOUS; SUBCUTANEOUS at 14:20

## 2017-01-21 RX ADMIN — Medication 250 MILLIGRAM(S): at 03:39

## 2017-01-21 NOTE — DISCHARGE NOTE ADULT - CARE PLAN
Principal Discharge DX:	Post op infection  Goal:	skin infection  Instructions for follow-up, activity and diet:	Please follow up with Dr. Saucedo in 1-2 weeks, please call his office to make an appointment. You may resume your regular diet as tolerated.  Please follow up with Dr. Hurtado as well for ABBIE drain removal. Please use antibiotic and antifungal as directed. Please keep area dry. Please return to the ED if you develop fever greater than 101F, worsening abdominal pain, CP or SOB.

## 2017-01-21 NOTE — ED ADULT NURSE NOTE - CHIEF COMPLAINT QUOTE
pt had a hernia repair like a week ago , pt was seen in [facility] ER on Saturday singed out AMA , pt c/o lower adnominal pain , c/o nausea , no vomiting, pt has Luis A -Ledbetter drains on , pt states " site looks infected and sutures/ staples are opening "

## 2017-01-21 NOTE — H&P ADULT. - GASTROINTESTINAL COMMENTS
+ve bs, soft, panus covering incisional wound, noticeable foul smell and discharge from wound site, mona with serosang output

## 2017-01-21 NOTE — DISCHARGE NOTE ADULT - CARE PROVIDERS DIRECT ADDRESSES
[email or web address],DirectAddress_Unknown,DirectAddress_Unknown

## 2017-01-21 NOTE — DISCHARGE NOTE ADULT - MEDICATION SUMMARY - MEDICATIONS TO TAKE
I will START or STAY ON the medications listed below when I get home from the hospital:    HYDROmorphone 4 mg oral tablet  -- 1 tab(s) by mouth every 4 hours, As needed, Severe Pain (7 - 10) MDD:6  -- Indication: For home medication    HYDROmorphone 4 mg oral tablet  -- 1 tab(s) by mouth every 4 hours, As Needed -for severe pain MDD:6  -- Caution federal law prohibits the transfer of this drug to any person other  than the person for whom it was prescribed.  May cause drowsiness.  Alcohol may intensify this effect.  Use care when operating dangerous machinery.  This prescription cannot be refilled.  Using more of this medication than prescribed may cause serious breathing problems.    -- Indication: For home medication    metFORMIN 1000 mg oral tablet  -- 1 tab(s) by mouth 2 times a day  -- Indication: For home medication    metoprolol succinate 100 mg oral tablet, extended release  -- 1 tab(s) by mouth once a day  -- Indication: For home medication    albuterol  --  inhaled   -- Indication: For home medication    bacitracin 500 units/g topical ointment  -- 1 application on skin 2 times a day  -- Indication: For antibiotic    nystatin 100,000 units/g topical powder  -- 1 application on skin 2 times a day  -- Indication: For antifungal    MiraLax - oral powder for reconstitution  -- 17 gram(s) by mouth once a day  -- Dilute this medication with liquid before administration.  It is very important that you take or use this exactly as directed.  Do not skip doses or discontinue unless directed by your doctor.    -- Indication: For constipation    Cipro 500 mg oral tablet  -- 1 tab(s) by mouth every 12 hours  -- Avoid prolonged or excessive exposure to direct and/or artificial sunlight while taking this medication.  Check with your doctor before becoming pregnant.  Do not take dairy products, antacids, or iron preparations within one hour of this medication.  Finish all this medication unless otherwise directed by prescriber.  Medication should be taken with plenty of water.    -- Indication: For antibiotic

## 2017-01-21 NOTE — DISCHARGE NOTE ADULT - CARE PROVIDER_API CALL
Adolfo Saucedo), Surgery  [address] [place] [address]  Phone: [phone]  Fax: [phone]    Hema Hurtado), Surgery  [address] [place] [address]  Phone: [phone]  Fax: [phone]

## 2017-01-21 NOTE — ED PROVIDER NOTE - MEDICAL DECISION MAKING DETAILS
35 yo female , morbidly obese, with HTN, DM, HLD, s/p ventral hernia repair with ABBIE drainage in place since 01/08/2017, in the Er c/o pain , rash and malodorous drainage at the surgical incision. pt afebrile, VSS. plan : labs, abd. CT to r/o abscess , surgical consult

## 2017-01-21 NOTE — ED ADULT NURSE NOTE - OBJECTIVE STATEMENT
37yo F, A&ox3, sp hernia repair, came into Er c/o generalized abdominal pain,. States recently d/c last week. Seen in ER saturday for similar c/o but signed out ama. Noted ABBIE with serosanguinous drainage, redness noted to site. noted incision to lower abdomen with serous drainage and pink. Near opening of ABBIE minimal purulent drainage noted. Pt denies fever nor chills. Denies urinary s/s. No n/v. +bowl sounds, no changes in bm. Lungs clear bilateral. No jvd, no edema. Will continue to monitor.

## 2017-01-21 NOTE — ED PROVIDER NOTE - SKIN, MLM
Skin normal color for race, warm, dry and intact. No evidence of rash. malodorous drainage from the surgical site, staples in place, erythema

## 2017-01-21 NOTE — PROGRESS NOTE ADULT - SUBJECTIVE AND OBJECTIVE BOX
INTERVAL HPI/OVERNIGHT EVENTS: Admitted for skin infection near incision site      SUBJECTIVE:  Flatus: [x ] YES [ ] NO             Bowel Movement: [ x] YES [ ] NO  Pain Control Adequate: [ x] YES [ ] NO  Nausea: [ ] YES [x] NO            Vomiting: [ ] YES [ x] NO  Diarrhea: [ ] YES [x ] NO         Constipation: [ ] YES [ x] NO     Chest Pain: [ ] YES [x ] NO    SOB:  [ ] YES [ x] NO    MEDICATIONS  (STANDING):  heparin  Injectable 7500Unit(s) SubCutaneous every 8 hours  insulin lispro (HumaLOG) corrective regimen sliding scale  SubCutaneous three times a day before meals  dextrose 5%. 1000milliLiter(s) IV Continuous <Continuous>  morphine  - Injectable 4milliGRAM(s) IV Push once  dextrose 50% Injectable 12.5Gram(s) IV Push once  clindamycin IVPB 600milliGRAM(s) IV Intermittent every 8 hours  vancomycin  IVPB 1000milliGRAM(s) IV Intermittent every 12 hours  diphenhydrAMINE   Injectable 50milliGRAM(s) IV Push once  BACItracin   Ointment 1Application(s) Topical two times a day  nystatin Powder 1Application(s) Topical two times a day    MEDICATIONS  (PRN):  oxyCODONE  5 mG/acetaminophen 325 mG 1Tablet(s) Oral every 4 hours PRN Moderate Pain  oxyCODONE  5 mG/acetaminophen 325 mG 2Tablet(s) Oral every 4 hours PRN Severe Pain  ondansetron Injectable 4milliGRAM(s) IV Push every 6 hours PRN Nausea  dextrose Gel 1Dose(s) Oral once PRN Blood Glucose LESS THAN 70 milliGRAM(s)/deciliter  glucagon  Injectable 1milliGRAM(s) IntraMuscular once PRN Glucose LESS THAN 70 milligrams/deciliter      Vital Signs Last 24 Hrs  T(C): 36.8, Max: 36.9 ( @ 23:27)  T(F): 98.3, Max: 98.4 ( @ 23:27)  HR: 89 (89 - 116)  BP: 154/87 (123/71 - 154/87)  BP(mean): --  RR: 18 (18 - 18)  SpO2: 98% (97% - 98%)    PHYSICAL EXAM:      Constitutional: NAD, A&Ox3    Gastrointestinal: Soft, ttp around incision site and at areas of skin breakdown, ND, No guarding or rebound    Extremities: no peripheral edema    Incision: CDI, no weeping or drainage from site, every 3rd staple removed, wound probed without success        I&O's Detail      LABS:                        12.3   11.0  )-----------( 502      ( 2017 00:34 )             37.9     2017 00:34    139    |  103    |  7      ----------------------------<  165    3.6     |  29     |  0.78     Ca    8.7        2017 00:34    TPro  7.4    /  Alb  2.9    /  TBili  0.2    /  DBili  x      /  AST  12     /  ALT  21     /  AlkPhos  114    2017 00:34      Urinalysis Basic - ( 2017 00:46 )    Color: Yellow / Appearance: Cloudy / S.025 / pH: x  Gluc: x / Ketone: NEGATIVE  / Bili: NEGATIVE / Urobili: 0.2 E.U./dL   Blood: x / Protein: NEGATIVE mg/dL / Nitrite: NEGATIVE   Leuk Esterase: Small / RBC: Many /HPF / WBC > 10 /HPF   Sq Epi: x / Non Sq Epi: Moderate /HPF / Bacteria: Present /HPF          RADIOLOGY & ADDITIONAL STUDIES:

## 2017-01-21 NOTE — ED PROVIDER NOTE - ATTENDING CONTRIBUTION TO CARE
36 yof pw pain to surgical site (sp ventral hernia repair 1/8/17), w/ drainage noted for 3 days.  no fc, no nv.    agree w/ PA, surgical site w/ erythema, and malodorous drainage, will check labs, CT eval for possible collection/fistula/abscess, surgical consult.

## 2017-01-21 NOTE — DISCHARGE NOTE ADULT - HOSPITAL COURSE
35 yo F with previous ventral hernia repair, presented to ED with incisional pain. Pt underwent CT scan which showed no signs of collection. Staples were removed and wound was probed without success.  Skin appears to have breakdown and was started on nystatin and bacitracin. Pt was also started on IV Vanc/Zosyn. Pt states she has an emergency and needs to be D/C'd tonight. Pt tolerating diet, pain improved, and skin intact.

## 2017-01-21 NOTE — H&P ADULT. - ASSESSMENT
36F pt with above history now with superficial wound infection    -Admit to surgery, [facility]  -IV abx  -ADA diet  -DVT ppx  -home meds  -SLIV

## 2017-01-21 NOTE — H&P ADULT. - HISTORY OF PRESENT ILLNESS
36F w/PMH DM, [place], HLD, csection x 2, POD 14 ventral hernia repair presents to [facility] with complaints of worsening incisinoal pain after not being able to fill her prescription for percocet. Has not been able to follow up with Dr. Hurtado. Says that she has noticed some foul smell come from the wound and mild drainage. Denies f/c/s. Denies cp/sob/palp.

## 2017-01-21 NOTE — DISCHARGE NOTE ADULT - PATIENT PORTAL LINK FT
“You can access the [facility] Patient Portal, offered by [facility], by registering with the following website: [email or web address]”

## 2017-01-21 NOTE — PROGRESS NOTE ADULT - ASSESSMENT
35 yo f s/p ventral hernia repair now with superficial wound infection    Regular  clindamycin  pain/nausea control  DVT ppx  f/u labs

## 2017-01-21 NOTE — DISCHARGE NOTE ADULT - PLAN OF CARE
skin infection Please follow up with Dr. Saucedo in 1-2 weeks, please call his office to make an appointment. You may resume your regular diet as tolerated.  Please follow up with Dr. Hurtado as well for ABBIE drain removal. Please use antibiotic and antifungal as directed. Please keep area dry. Please return to the ED if you develop fever greater than 101F, worsening abdominal pain, CP or SOB.

## 2017-01-21 NOTE — ED PROVIDER NOTE - OBJECTIVE STATEMENT
35 yo female with h/o DM, 2 c/sections in the past, s/p ventral hernia repair on 01/08/2016 by  at [facility], recent h/o UTI, tonight in the Er c/o worsening of pain over her surgical incision site, also c/o malodorous drainage from the incision  for the past 3 days. Pt denies fever, chills. ABBIE drain is in place and pt did not see her surgeon after procedure yet. 35 yo female with h/o DM, 2 c/sections in the past, s/p ventral hernia repair on 01/08/2017 by  at [facility], recent h/o UTI, tonight in the Er c/o worsening of pain over her surgical incision site, also c/o malodorous drainage from the incision  for the past 3 days. Pt denies fever, chills. ABBIE drain is in place and pt did not see her surgeon after procedure yet.

## 2017-01-22 LAB
CULTURE RESULTS: SIGNIFICANT CHANGE UP
SPECIMEN SOURCE: SIGNIFICANT CHANGE UP

## 2017-01-24 LAB
-  AMPICILLIN: SIGNIFICANT CHANGE UP
-  LINEZOLID: SIGNIFICANT CHANGE UP
METHOD TYPE: SIGNIFICANT CHANGE UP

## 2017-01-25 DIAGNOSIS — Z79.84 LONG TERM (CURRENT) USE OF ORAL HYPOGLYCEMIC DRUGS: ICD-10-CM

## 2017-01-25 DIAGNOSIS — T81.4XXA INFECTION FOLLOWING A PROCEDURE, INITIAL ENCOUNTER: ICD-10-CM

## 2017-01-25 DIAGNOSIS — E11.9 TYPE 2 DIABETES MELLITUS WITHOUT COMPLICATIONS: ICD-10-CM

## 2017-01-25 DIAGNOSIS — I10 ESSENTIAL (PRIMARY) HYPERTENSION: ICD-10-CM

## 2017-01-25 DIAGNOSIS — E78.5 HYPERLIPIDEMIA, UNSPECIFIED: ICD-10-CM

## 2017-01-25 DIAGNOSIS — E66.9 OBESITY, UNSPECIFIED: ICD-10-CM
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
## 2017-01-25 LAB
CULTURE RESULTS: SIGNIFICANT CHANGE UP
ORGANISM # SPEC MICROSCOPIC CNT: SIGNIFICANT CHANGE UP
ORGANISM # SPEC MICROSCOPIC CNT: SIGNIFICANT CHANGE UP
SPECIMEN SOURCE: SIGNIFICANT CHANGE UP

## 2017-01-26 ENCOUNTER — EMERGENCY (EMERGENCY)
Facility: HOSPITAL | Age: 37
LOS: 1 days | Discharge: HOME | End: 2017-01-26
Attending: EMERGENCY MEDICINE | Admitting: EMERGENCY MEDICINE
Payer: COMMERCIAL

## 2017-01-26 VITALS
TEMPERATURE: 98 F | OXYGEN SATURATION: 99 % | RESPIRATION RATE: 18 BRPM | DIASTOLIC BLOOD PRESSURE: 82 MMHG | HEART RATE: 65 BPM | SYSTOLIC BLOOD PRESSURE: 146 MMHG

## 2017-01-26 VITALS
DIASTOLIC BLOOD PRESSURE: 95 MMHG | WEIGHT: 240.08 LBS | HEIGHT: 67 IN | HEART RATE: 78 BPM | SYSTOLIC BLOOD PRESSURE: 157 MMHG | OXYGEN SATURATION: 97 % | TEMPERATURE: 98 F | RESPIRATION RATE: 18 BRPM

## 2017-01-26 DIAGNOSIS — Z79.2 LONG TERM (CURRENT) USE OF ANTIBIOTICS: ICD-10-CM

## 2017-01-26 DIAGNOSIS — E11.9 TYPE 2 DIABETES MELLITUS WITHOUT COMPLICATIONS: ICD-10-CM

## 2017-01-26 DIAGNOSIS — Z98.89 OTHER SPECIFIED POSTPROCEDURAL STATES: Chronic | ICD-10-CM

## 2017-01-26 DIAGNOSIS — R10.32 LEFT LOWER QUADRANT PAIN: ICD-10-CM

## 2017-01-26 DIAGNOSIS — Z98.890 OTHER SPECIFIED POSTPROCEDURAL STATES: ICD-10-CM

## 2017-01-26 DIAGNOSIS — G89.18 OTHER ACUTE POSTPROCEDURAL PAIN: ICD-10-CM

## 2017-01-26 DIAGNOSIS — Z88.0 ALLERGY STATUS TO PENICILLIN: ICD-10-CM

## 2017-01-26 DIAGNOSIS — Z88.8 ALLERGY STATUS TO OTHER DRUGS, MEDICAMENTS AND BIOLOGICAL SUBSTANCES STATUS: ICD-10-CM

## 2017-01-26 DIAGNOSIS — Z98.890 OTHER SPECIFIED POSTPROCEDURAL STATES: Chronic | ICD-10-CM

## 2017-01-26 DIAGNOSIS — I10 ESSENTIAL (PRIMARY) HYPERTENSION: ICD-10-CM

## 2017-01-26 DIAGNOSIS — Z88.1 ALLERGY STATUS TO OTHER ANTIBIOTIC AGENTS STATUS: ICD-10-CM

## 2017-01-26 DIAGNOSIS — E78.5 HYPERLIPIDEMIA, UNSPECIFIED: ICD-10-CM

## 2017-01-26 DIAGNOSIS — Z79.899 OTHER LONG TERM (CURRENT) DRUG THERAPY: ICD-10-CM

## 2017-01-26 DIAGNOSIS — Z91.013 ALLERGY TO SEAFOOD: ICD-10-CM

## 2017-01-26 LAB
ANION GAP SERPL CALC-SCNC: 8 MMOL/L — LOW (ref 9–16)
BUN SERPL-MCNC: 12 MG/DL — SIGNIFICANT CHANGE UP (ref 7–23)
CALCIUM SERPL-MCNC: 8.8 MG/DL — SIGNIFICANT CHANGE UP (ref 8.5–10.5)
CHLORIDE SERPL-SCNC: 106 MMOL/L — SIGNIFICANT CHANGE UP (ref 96–108)
CO2 SERPL-SCNC: 26 MMOL/L — SIGNIFICANT CHANGE UP (ref 22–31)
CREAT SERPL-MCNC: 0.78 MG/DL — SIGNIFICANT CHANGE UP (ref 0.5–1.3)
CULTURE RESULTS: SIGNIFICANT CHANGE UP
CULTURE RESULTS: SIGNIFICANT CHANGE UP
GLUCOSE SERPL-MCNC: 148 MG/DL — HIGH (ref 70–99)
HCT VFR BLD CALC: 40 % — SIGNIFICANT CHANGE UP (ref 34.5–45)
HGB BLD-MCNC: 12.9 G/DL — SIGNIFICANT CHANGE UP (ref 11.5–15.5)
MAGNESIUM SERPL-MCNC: 2.1 MG/DL — SIGNIFICANT CHANGE UP (ref 1.6–2.4)
MCHC RBC-ENTMCNC: 25.4 PG — LOW (ref 27–34)
MCHC RBC-ENTMCNC: 32.3 G/DL — SIGNIFICANT CHANGE UP (ref 32–36)
MCV RBC AUTO: 78.7 FL — LOW (ref 80–100)
PLATELET # BLD AUTO: 604 K/UL — HIGH (ref 150–400)
POTASSIUM SERPL-MCNC: 4.2 MMOL/L — SIGNIFICANT CHANGE UP (ref 3.5–5.3)
POTASSIUM SERPL-SCNC: 4.2 MMOL/L — SIGNIFICANT CHANGE UP (ref 3.5–5.3)
RBC # BLD: 5.08 M/UL — SIGNIFICANT CHANGE UP (ref 3.8–5.2)
RBC # FLD: 16 % — SIGNIFICANT CHANGE UP (ref 10.3–16.9)
SODIUM SERPL-SCNC: 140 MMOL/L — SIGNIFICANT CHANGE UP (ref 135–145)
SPECIMEN SOURCE: SIGNIFICANT CHANGE UP
SPECIMEN SOURCE: SIGNIFICANT CHANGE UP
WBC # BLD: 10.8 K/UL — HIGH (ref 3.8–10.5)
WBC # FLD AUTO: 10.8 K/UL — HIGH (ref 3.8–10.5)

## 2017-01-26 PROCEDURE — 99284 EMERGENCY DEPT VISIT MOD MDM: CPT

## 2017-01-26 PROCEDURE — 36415 COLL VENOUS BLD VENIPUNCTURE: CPT

## 2017-01-26 PROCEDURE — 83735 ASSAY OF MAGNESIUM: CPT

## 2017-01-26 PROCEDURE — 80048 BASIC METABOLIC PNL TOTAL CA: CPT

## 2017-01-26 PROCEDURE — 99284 EMERGENCY DEPT VISIT MOD MDM: CPT | Mod: 25

## 2017-01-26 PROCEDURE — 85027 COMPLETE CBC AUTOMATED: CPT

## 2017-01-26 RX ORDER — IBUPROFEN 200 MG
1 TABLET ORAL
Qty: 30 | Refills: 0 | OUTPATIENT
Start: 2017-01-26

## 2017-01-26 NOTE — ED PROVIDER NOTE - PROGRESS NOTE DETAILS
Received 2 percocet for pain. Surgery consulted Surgery removed ABBIE drain. Reports that she can use neosporin around the ABBIE site and that it will eventually close on its own. For pain control she can alternate between tylenol and advil. Needs to follow up in Dr. Hurtado's office.

## 2017-01-26 NOTE — ED PROVIDER NOTE - MEDICAL DECISION MAKING DETAILS
Case discussed with attending and surgery. No concern for infection at this time. Patient to go home and follow up with Dr. Hurtado as outpatient.

## 2017-01-26 NOTE — ED PROVIDER NOTE - ATTENDING CONTRIBUTION TO CARE
36 F s/p ventral hernia surgery 2 weeks ago- presents with incision pain- here 5 d ago- rx'ed with IV abx for wound infection d/c'ed on cipro- returns with pain and redness  partially pulled out her ABBIE today  now presenting with pain- drainage from wound site  serosanguinous  was supposed to FU this week- has not made appt  ABBIE drainage was down to 25/day  IMP- ABBIE removed/partially removed  surg consult- ?remove ABBIE drain and FU with surgery

## 2017-01-26 NOTE — CONSULT NOTE ADULT - SUBJECTIVE AND OBJECTIVE BOX
HPI: 35 yo female with recent incisional hernia repair. Pt was at home with ABBIE drain, when she was cleaning drain she pulled it out. She had tenderness at drain where it was pulled because stitch was pulled out. She otherwise had no fevers, no chills, no nausea, no vomiting. In the ED the pt is afebrile, WBC down from previous admission. Drain not holding suction because it is out beyond drainage grooves.       PAST MEDICAL & SURGICAL HISTORY:  Abdominal hernia  Hernia  Obesity  Diabetes  Hyperlipidemia  Essential hypertension  H/O ventral hernia repair  H/O:   History of D&amp;C  H/O LEEP        Allergies    amoxicillin (Unknown)  iodine containing compounds (Hives; Anaphylaxis)  penicillin (Hives)  shellfish. (Hives; Anaphylaxis)    Intolerances        SOCIAL HISTORY:    FAMILY HISTORY:  No pertinent family history in first degree relatives      Vital Signs Last 24 Hrs  T(C): 36.9, Max: 36.9 ( @ 14:26)  T(F): 98.5, Max: 98.5 ( @ 14:26)  HR: 78 (78 - 78)  BP: 157/95 (157/95 - 157/95)  BP(mean): --  RR: 18 (18 - 18)  SpO2: 97% (97% - 97%)    PHYSICAL EXAM:  Gen: patient in mild distress 2.2 drain half out, but otherwise in NAD walking comfortably  Chest: RRR  Abdomen: soft, non-distended, obese, Pfannenstiel incision healing with fibrinous exudate, LLQ drain mostly out with some skin necrosis from pressure of drain mildly tender. Drain pulled out, no drainage        LABS:                        12.9   10.8  )-----------( 604      ( 2017 16:04 )             40.0     2017 16:04    140    |  106    |  12     ----------------------------<  148    4.2     |  26     |  0.78     Ca    8.8        2017 16:04  Mg     2.1       2017 16:04            RADIOLOGY & ADDITIONAL STUDIES:

## 2017-01-26 NOTE — ED PROVIDER NOTE - OBJECTIVE STATEMENT
37yo w/Htn, Hld, DM, morbid obesity, h/o csection x 2 and known lower midline ventral hernias in the past, had recent emergent ventral hernia repair on 1/8 by Dr. Hurtado. Presenting today because pulled the ABBIE drain while cleaning the wound. Also severe burning pain around the ABBIE entry site with fluid draining from it. Staples in place also. Poor compliance. Did not  abx of pain meds because they "were sent to the wrong pharmacy." No fevers/n/v. Some subjective chills when pain is severe.

## 2017-01-26 NOTE — ED PROVIDER NOTE - SKIN, MLM
Surgical  incision with staples. Possible fungal irritation. ABBIE site with mild erythema around the site and some clear drainage.

## 2017-01-26 NOTE — ED ADULT TRIAGE NOTE - CHIEF COMPLAINT QUOTE
" I accidentally pulled by Panfilo peacock and now is leaking with pain , had hernia repair last week . " Denies any fever nor chills .

## 2017-01-26 NOTE — ED PROVIDER NOTE - PLAN OF CARE
Wound doesn't look infected. ABBIE drain removed by surgery this visit. Please make appointment with Dr. Hurtado's office ASAP. You may use neosporin around the ABBIE site, and it should close in a week or two. For pain you can alternate between advil and tylenol. Follow up in Dr. Hurtado's office. Continue to keep the wound clean as you have been doing.

## 2017-01-26 NOTE — ED ADULT NURSE REASSESSMENT NOTE - NS ED NURSE REASSESS COMMENT FT1
refused to sign discharge instructions. Pt became hostile and refuses to listen to plan of care, noncompliant with postop care. MD Riddle brought to bedside and explained updated plan of care post op and drain removal, instructed patient to f/u with surgery.

## 2017-01-26 NOTE — ED ADULT NURSE NOTE - OBJECTIVE STATEMENT
Pt had hernia repair surgery and went home with MONA drain that was properly working and draining well, yesterday pt was cleaning MONA drain and pulled at it accidentally and ever since then pt has been having pain to mona insertion site left lower abdominal region, site is red, tender to touch, MONA drain no longer able to hold suction, brown drainage noted and serosanguinous drainage draining around insertion site. Pt denies other pain, denies nausea/comiting, fever/chills.

## 2017-01-26 NOTE — ED PROVIDER NOTE - CARE PLAN
Principal Discharge DX:	H/O ventral hernia repair  Instructions for follow-up, activity and diet:	Wound doesn't look infected. ABBIE drain removed by surgery this visit. Please make appointment with Dr. Hurtado's office ASAP. You may use neosporin around the ABBIE site, and it should close in a week or two. For pain you can alternate between advil and tylenol. Follow up in Dr. Hurtado's office. Continue to keep the wound clean as you have been doing.

## 2017-01-30 PROBLEM — K46.9 UNSPECIFIED ABDOMINAL HERNIA WITHOUT OBSTRUCTION OR GANGRENE: Chronic | Status: ACTIVE | Noted: 2017-01-06

## 2017-02-06 ENCOUNTER — INPATIENT (INPATIENT)
Facility: HOSPITAL | Age: 37
LOS: 8 days | Discharge: HOME | DRG: 863 | End: 2017-02-15
Attending: SURGERY | Admitting: SURGERY
Payer: COMMERCIAL

## 2017-02-06 VITALS
TEMPERATURE: 98 F | WEIGHT: 240.08 LBS | HEART RATE: 104 BPM | SYSTOLIC BLOOD PRESSURE: 149 MMHG | OXYGEN SATURATION: 100 % | DIASTOLIC BLOOD PRESSURE: 74 MMHG | RESPIRATION RATE: 18 BRPM

## 2017-02-06 DIAGNOSIS — Z98.89 OTHER SPECIFIED POSTPROCEDURAL STATES: Chronic | ICD-10-CM

## 2017-02-06 DIAGNOSIS — Z98.890 OTHER SPECIFIED POSTPROCEDURAL STATES: Chronic | ICD-10-CM

## 2017-02-06 LAB
ALBUMIN SERPL ELPH-MCNC: 2.7 G/DL — LOW (ref 3.4–5)
ALP SERPL-CCNC: 132 U/L — HIGH (ref 40–120)
ALT FLD-CCNC: 15 U/L — SIGNIFICANT CHANGE UP (ref 12–42)
ANION GAP SERPL CALC-SCNC: 7 MMOL/L — LOW (ref 9–16)
APPEARANCE UR: SIGNIFICANT CHANGE UP
AST SERPL-CCNC: 9 U/L — LOW (ref 15–37)
BASOPHILS NFR BLD AUTO: 0.2 % — SIGNIFICANT CHANGE UP (ref 0–2)
BILIRUB SERPL-MCNC: 0.2 MG/DL — SIGNIFICANT CHANGE UP (ref 0.2–1.2)
BILIRUB UR-MCNC: NEGATIVE — SIGNIFICANT CHANGE UP
BUN SERPL-MCNC: 8 MG/DL — SIGNIFICANT CHANGE UP (ref 7–23)
CALCIUM SERPL-MCNC: 9 MG/DL — SIGNIFICANT CHANGE UP (ref 8.5–10.5)
CHLORIDE SERPL-SCNC: 102 MMOL/L — SIGNIFICANT CHANGE UP (ref 96–108)
CO2 SERPL-SCNC: 29 MMOL/L — SIGNIFICANT CHANGE UP (ref 22–31)
COLOR SPEC: YELLOW — SIGNIFICANT CHANGE UP
CREAT SERPL-MCNC: 0.72 MG/DL — SIGNIFICANT CHANGE UP (ref 0.5–1.3)
DIFF PNL FLD: NEGATIVE — SIGNIFICANT CHANGE UP
EOSINOPHIL NFR BLD AUTO: 1.2 % — SIGNIFICANT CHANGE UP (ref 0–6)
GLUCOSE SERPL-MCNC: 150 MG/DL — HIGH (ref 70–99)
GLUCOSE UR QL: NEGATIVE — SIGNIFICANT CHANGE UP
HCT VFR BLD CALC: 36.6 % — SIGNIFICANT CHANGE UP (ref 34.5–45)
HGB BLD-MCNC: 11.7 G/DL — SIGNIFICANT CHANGE UP (ref 11.5–15.5)
KETONES UR-MCNC: NEGATIVE — SIGNIFICANT CHANGE UP
LACTATE SERPL-SCNC: 2.2 MMOL/L — HIGH (ref 0.5–2)
LEUKOCYTE ESTERASE UR-ACNC: (no result)
LIDOCAIN IGE QN: 102 U/L — SIGNIFICANT CHANGE UP (ref 73–393)
LYMPHOCYTES # BLD AUTO: 21.8 % — SIGNIFICANT CHANGE UP (ref 13–44)
MCHC RBC-ENTMCNC: 24.9 PG — LOW (ref 27–34)
MCHC RBC-ENTMCNC: 32 G/DL — SIGNIFICANT CHANGE UP (ref 32–36)
MCV RBC AUTO: 77.9 FL — LOW (ref 80–100)
MONOCYTES NFR BLD AUTO: 8.3 % — SIGNIFICANT CHANGE UP (ref 2–14)
NEUTROPHILS NFR BLD AUTO: 68.5 % — SIGNIFICANT CHANGE UP (ref 43–77)
NITRITE UR-MCNC: NEGATIVE — SIGNIFICANT CHANGE UP
PH UR: 6 — SIGNIFICANT CHANGE UP (ref 4–8)
PLATELET # BLD AUTO: 502 K/UL — HIGH (ref 150–400)
POTASSIUM SERPL-MCNC: 3.3 MMOL/L — LOW (ref 3.5–5.3)
POTASSIUM SERPL-SCNC: 3.3 MMOL/L — LOW (ref 3.5–5.3)
PROT SERPL-MCNC: 7.7 G/DL — SIGNIFICANT CHANGE UP (ref 6.4–8.2)
PROT UR-MCNC: NEGATIVE MG/DL — SIGNIFICANT CHANGE UP
RBC # BLD: 4.7 M/UL — SIGNIFICANT CHANGE UP (ref 3.8–5.2)
RBC # FLD: 15.8 % — SIGNIFICANT CHANGE UP (ref 10.3–16.9)
SODIUM SERPL-SCNC: 138 MMOL/L — SIGNIFICANT CHANGE UP (ref 135–145)
SP GR SPEC: 1.02 — SIGNIFICANT CHANGE UP (ref 1–1.03)
UROBILINOGEN FLD QL: 0.2 E.U./DL — SIGNIFICANT CHANGE UP
WBC # BLD: 13.2 K/UL — HIGH (ref 3.8–10.5)
WBC # FLD AUTO: 13.2 K/UL — HIGH (ref 3.8–10.5)

## 2017-02-06 PROCEDURE — 99285 EMERGENCY DEPT VISIT HI MDM: CPT

## 2017-02-06 RX ORDER — ONDANSETRON 8 MG/1
4 TABLET, FILM COATED ORAL ONCE
Qty: 0 | Refills: 0 | Status: COMPLETED | OUTPATIENT
Start: 2017-02-06 | End: 2017-02-06

## 2017-02-06 RX ORDER — SODIUM CHLORIDE 9 MG/ML
1000 INJECTION INTRAMUSCULAR; INTRAVENOUS; SUBCUTANEOUS ONCE
Qty: 0 | Refills: 0 | Status: COMPLETED | OUTPATIENT
Start: 2017-02-06 | End: 2017-02-06

## 2017-02-06 RX ADMIN — SODIUM CHLORIDE 2000 MILLILITER(S): 9 INJECTION INTRAMUSCULAR; INTRAVENOUS; SUBCUTANEOUS at 23:29

## 2017-02-06 RX ADMIN — ONDANSETRON 104 MILLIGRAM(S): 8 TABLET, FILM COATED ORAL at 23:29

## 2017-02-06 NOTE — ED PROVIDER NOTE - CONSTITUTIONAL, MLM
normal... obese, awake, alert, oriented to person, place, time/situation and in no apparent distress.

## 2017-02-06 NOTE — ED PROVIDER NOTE - OBJECTIVE STATEMENT
35yo w/Htn, Hld, DM, morbid obesity, h/o csection x 2 and incisional hernia repair a few weeks ago by Dr. Huratdo presents to ed for increased pain and swelling at surgical site for the past 3 days.  pt reports she was suppose to see Dr. Hurtado to have juan david taken out this week, but was unable to get an appointment.  Pt also reports intermittent nausea and vomiting over the past 3 days, reports 2 episodes of vomiting today, the last episode when she first arrived in ed.  Pt also reports intermittent watery diarrhea, pt reports 2 episodes earlier today and that she took immodium.  Pt also reports feeling pressure to bladder area for the past 3 days. Pt otherwise denies fevers, chills, chest pain, sob, difficulty breathing, lightheadedness, dizziness

## 2017-02-06 NOTE — ED PROVIDER NOTE - GASTROINTESTINAL, MLM
large horizontal incision to lower abdomen, no visible drainage, + induration around incision site, diffuse ttp , neg guarding, neg rebound, neg cvat

## 2017-02-06 NOTE — ED PROVIDER NOTE - ATTENDING CONTRIBUTION TO CARE
36F with concern for post-op complication, hx of c-sxn and incisional hernia repair by Dr. Hurtado presenting with nausea, vomiting, diarrhea. Vitals wnl, no drainage to abd, some induration around incision site. Surgery consulted, noncon CT ordered. Plan for admission to surgery .

## 2017-02-06 NOTE — ED ADULT NURSE NOTE - OBJECTIVE STATEMENT
Pt c/o of abdominal pain, diarrhea, dysuria, fever, and generalized discomfort. Pt states she had a max of 101.8 temp. She states she recently had hernia repair surgery 3 weeks ago. Pt also recently treated for UTI post surgery with bactrim.

## 2017-02-06 NOTE — ED PROVIDER NOTE - MEDICAL DECISION MAKING DETAILS
Case d/w Dr. Jean, 37yo w/Htn, Hld, DM, morbid obesity, h/o csection x 2 and incisional hernia repair a few weeks ago by Dr. Hurtado presents to ed for increased pain and swelling at surgical site for the past 3 days pt also with intermittent nausea/vomiting and diarrhea for the past 3 days.  On exam incision to lower abdomen clean no obvious signs of infection, pt however with diffuse abdominal ttp. Comprehensive labs sent.  Surgery resident consulted to eval pt, he is recommending CT abd and pelvis and po percocet.

## 2017-02-07 LAB
ANION GAP SERPL CALC-SCNC: 11 MMOL/L — SIGNIFICANT CHANGE UP (ref 9–16)
APPEARANCE UR: CLEAR — SIGNIFICANT CHANGE UP
BILIRUB UR-MCNC: NEGATIVE — SIGNIFICANT CHANGE UP
BLD GP AB SCN SERPL QL: NEGATIVE — SIGNIFICANT CHANGE UP
BUN SERPL-MCNC: 6 MG/DL — LOW (ref 7–23)
CALCIUM SERPL-MCNC: 7.8 MG/DL — LOW (ref 8.5–10.5)
CHLORIDE SERPL-SCNC: 104 MMOL/L — SIGNIFICANT CHANGE UP (ref 96–108)
CO2 SERPL-SCNC: 25 MMOL/L — SIGNIFICANT CHANGE UP (ref 22–31)
COLOR SPEC: YELLOW — SIGNIFICANT CHANGE UP
CREAT SERPL-MCNC: 0.64 MG/DL — SIGNIFICANT CHANGE UP (ref 0.5–1.3)
DIFF PNL FLD: NEGATIVE — SIGNIFICANT CHANGE UP
GLUCOSE SERPL-MCNC: 214 MG/DL — HIGH (ref 70–99)
GLUCOSE UR QL: NEGATIVE — SIGNIFICANT CHANGE UP
HCG UR QL: NEGATIVE — SIGNIFICANT CHANGE UP
HCT VFR BLD CALC: 34.2 % — LOW (ref 34.5–45)
HGB BLD-MCNC: 10.9 G/DL — LOW (ref 11.5–15.5)
INR BLD: 1.26 — HIGH (ref 0.88–1.16)
KETONES UR-MCNC: NEGATIVE — SIGNIFICANT CHANGE UP
LEUKOCYTE ESTERASE UR-ACNC: (no result)
MAGNESIUM SERPL-MCNC: 1.9 MG/DL — SIGNIFICANT CHANGE UP (ref 1.6–2.4)
MCHC RBC-ENTMCNC: 25.4 PG — LOW (ref 27–34)
MCHC RBC-ENTMCNC: 31.9 G/DL — LOW (ref 32–36)
MCV RBC AUTO: 79.7 FL — LOW (ref 80–100)
NITRITE UR-MCNC: NEGATIVE — SIGNIFICANT CHANGE UP
PH UR: 6.5 — SIGNIFICANT CHANGE UP (ref 4–8)
PHOSPHATE SERPL-MCNC: 2.8 MG/DL — SIGNIFICANT CHANGE UP (ref 2.5–4.5)
PLATELET # BLD AUTO: 376 K/UL — SIGNIFICANT CHANGE UP (ref 150–400)
POTASSIUM SERPL-MCNC: 3.4 MMOL/L — LOW (ref 3.5–5.3)
POTASSIUM SERPL-SCNC: 3.4 MMOL/L — LOW (ref 3.5–5.3)
PROT UR-MCNC: NEGATIVE MG/DL — SIGNIFICANT CHANGE UP
PROTHROM AB SERPL-ACNC: 14 SEC — HIGH (ref 10–13.1)
RBC # BLD: 4.29 M/UL — SIGNIFICANT CHANGE UP (ref 3.8–5.2)
RBC # FLD: 15.8 % — SIGNIFICANT CHANGE UP (ref 10.3–16.9)
RH IG SCN BLD-IMP: NEGATIVE — SIGNIFICANT CHANGE UP
SODIUM SERPL-SCNC: 140 MMOL/L — SIGNIFICANT CHANGE UP (ref 135–145)
SP GR SPEC: 1.02 — SIGNIFICANT CHANGE UP (ref 1–1.03)
UROBILINOGEN FLD QL: 0.2 E.U./DL — SIGNIFICANT CHANGE UP
WBC # BLD: 15 K/UL — HIGH (ref 3.8–10.5)
WBC # FLD AUTO: 15 K/UL — HIGH (ref 3.8–10.5)

## 2017-02-07 PROCEDURE — 74176 CT ABD & PELVIS W/O CONTRAST: CPT | Mod: 26

## 2017-02-07 PROCEDURE — 10030 IMG GID FLU COLL DRG SFT TIS: CPT

## 2017-02-07 PROCEDURE — 99222 1ST HOSP IP/OBS MODERATE 55: CPT

## 2017-02-07 RX ORDER — HYDROMORPHONE HYDROCHLORIDE 2 MG/ML
0.5 INJECTION INTRAMUSCULAR; INTRAVENOUS; SUBCUTANEOUS EVERY 4 HOURS
Qty: 0 | Refills: 0 | Status: DISCONTINUED | OUTPATIENT
Start: 2017-02-07 | End: 2017-02-09

## 2017-02-07 RX ORDER — METRONIDAZOLE 500 MG
500 TABLET ORAL ONCE
Qty: 0 | Refills: 0 | Status: COMPLETED | OUTPATIENT
Start: 2017-02-07 | End: 2017-02-07

## 2017-02-07 RX ORDER — CIPROFLOXACIN LACTATE 400MG/40ML
400 VIAL (ML) INTRAVENOUS ONCE
Qty: 0 | Refills: 0 | Status: COMPLETED | OUTPATIENT
Start: 2017-02-07 | End: 2017-02-07

## 2017-02-07 RX ORDER — CIPROFLOXACIN LACTATE 400MG/40ML
VIAL (ML) INTRAVENOUS
Qty: 0 | Refills: 0 | Status: DISCONTINUED | OUTPATIENT
Start: 2017-02-07 | End: 2017-02-13

## 2017-02-07 RX ORDER — VANCOMYCIN HCL 1 G
VIAL (EA) INTRAVENOUS
Qty: 0 | Refills: 0 | Status: DISCONTINUED | OUTPATIENT
Start: 2017-02-07 | End: 2017-02-07

## 2017-02-07 RX ORDER — SODIUM CHLORIDE 9 MG/ML
1000 INJECTION, SOLUTION INTRAVENOUS
Qty: 0 | Refills: 0 | Status: DISCONTINUED | OUTPATIENT
Start: 2017-02-07 | End: 2017-02-10

## 2017-02-07 RX ORDER — INSULIN LISPRO 100/ML
VIAL (ML) SUBCUTANEOUS EVERY 4 HOURS
Qty: 0 | Refills: 0 | Status: DISCONTINUED | OUTPATIENT
Start: 2017-02-07 | End: 2017-02-10

## 2017-02-07 RX ORDER — HYDROMORPHONE HYDROCHLORIDE 2 MG/ML
0.5 INJECTION INTRAMUSCULAR; INTRAVENOUS; SUBCUTANEOUS ONCE
Qty: 0 | Refills: 0 | Status: DISCONTINUED | OUTPATIENT
Start: 2017-02-07 | End: 2017-02-07

## 2017-02-07 RX ORDER — HYDROMORPHONE HYDROCHLORIDE 2 MG/ML
1 INJECTION INTRAMUSCULAR; INTRAVENOUS; SUBCUTANEOUS EVERY 4 HOURS
Qty: 0 | Refills: 0 | Status: DISCONTINUED | OUTPATIENT
Start: 2017-02-07 | End: 2017-02-07

## 2017-02-07 RX ORDER — CIPROFLOXACIN LACTATE 400MG/40ML
400 VIAL (ML) INTRAVENOUS EVERY 12 HOURS
Qty: 0 | Refills: 0 | Status: DISCONTINUED | OUTPATIENT
Start: 2017-02-08 | End: 2017-02-13

## 2017-02-07 RX ORDER — DEXTROSE 50 % IN WATER 50 %
1 SYRINGE (ML) INTRAVENOUS ONCE
Qty: 0 | Refills: 0 | Status: DISCONTINUED | OUTPATIENT
Start: 2017-02-07 | End: 2017-02-10

## 2017-02-07 RX ORDER — VANCOMYCIN HCL 1 G
1500 VIAL (EA) INTRAVENOUS ONCE
Qty: 0 | Refills: 0 | Status: COMPLETED | OUTPATIENT
Start: 2017-02-07 | End: 2017-02-07

## 2017-02-07 RX ORDER — ERTAPENEM SODIUM 1 G/1
1000 INJECTION, POWDER, LYOPHILIZED, FOR SOLUTION INTRAMUSCULAR; INTRAVENOUS ONCE
Qty: 0 | Refills: 0 | Status: DISCONTINUED | OUTPATIENT
Start: 2017-02-07 | End: 2017-02-07

## 2017-02-07 RX ORDER — ONDANSETRON 8 MG/1
4 TABLET, FILM COATED ORAL EVERY 6 HOURS
Qty: 0 | Refills: 0 | Status: DISCONTINUED | OUTPATIENT
Start: 2017-02-07 | End: 2017-02-07

## 2017-02-07 RX ORDER — METRONIDAZOLE 500 MG
TABLET ORAL
Qty: 0 | Refills: 0 | Status: DISCONTINUED | OUTPATIENT
Start: 2017-02-07 | End: 2017-02-13

## 2017-02-07 RX ORDER — DEXTROSE 50 % IN WATER 50 %
12.5 SYRINGE (ML) INTRAVENOUS ONCE
Qty: 0 | Refills: 0 | Status: DISCONTINUED | OUTPATIENT
Start: 2017-02-07 | End: 2017-02-10

## 2017-02-07 RX ORDER — GLUCAGON INJECTION, SOLUTION 0.5 MG/.1ML
1 INJECTION, SOLUTION SUBCUTANEOUS ONCE
Qty: 0 | Refills: 0 | Status: DISCONTINUED | OUTPATIENT
Start: 2017-02-07 | End: 2017-02-10

## 2017-02-07 RX ORDER — ERTAPENEM SODIUM 1 G/1
INJECTION, POWDER, LYOPHILIZED, FOR SOLUTION INTRAMUSCULAR; INTRAVENOUS
Qty: 0 | Refills: 0 | Status: DISCONTINUED | OUTPATIENT
Start: 2017-02-07 | End: 2017-02-07

## 2017-02-07 RX ORDER — ERTAPENEM SODIUM 1 G/1
1000 INJECTION, POWDER, LYOPHILIZED, FOR SOLUTION INTRAMUSCULAR; INTRAVENOUS EVERY 24 HOURS
Qty: 0 | Refills: 0 | Status: DISCONTINUED | OUTPATIENT
Start: 2017-02-07 | End: 2017-02-07

## 2017-02-07 RX ORDER — VANCOMYCIN HCL 1 G
1000 VIAL (EA) INTRAVENOUS EVERY 12 HOURS
Qty: 0 | Refills: 0 | Status: DISCONTINUED | OUTPATIENT
Start: 2017-02-07 | End: 2017-02-07

## 2017-02-07 RX ORDER — HEPARIN SODIUM 5000 [USP'U]/ML
5000 INJECTION INTRAVENOUS; SUBCUTANEOUS EVERY 8 HOURS
Qty: 0 | Refills: 0 | Status: DISCONTINUED | OUTPATIENT
Start: 2017-02-07 | End: 2017-02-15

## 2017-02-07 RX ORDER — ERTAPENEM SODIUM 1 G/1
1000 INJECTION, POWDER, LYOPHILIZED, FOR SOLUTION INTRAMUSCULAR; INTRAVENOUS EVERY 24 HOURS
Qty: 0 | Refills: 0 | Status: COMPLETED | OUTPATIENT
Start: 2017-02-07 | End: 2017-02-07

## 2017-02-07 RX ORDER — METRONIDAZOLE 500 MG
500 TABLET ORAL EVERY 8 HOURS
Qty: 0 | Refills: 0 | Status: DISCONTINUED | OUTPATIENT
Start: 2017-02-08 | End: 2017-02-13

## 2017-02-07 RX ORDER — METOPROLOL TARTRATE 50 MG
100 TABLET ORAL DAILY
Qty: 0 | Refills: 0 | Status: DISCONTINUED | OUTPATIENT
Start: 2017-02-07 | End: 2017-02-15

## 2017-02-07 RX ORDER — HYDROMORPHONE HYDROCHLORIDE 2 MG/ML
1 INJECTION INTRAMUSCULAR; INTRAVENOUS; SUBCUTANEOUS EVERY 4 HOURS
Qty: 0 | Refills: 0 | Status: DISCONTINUED | OUTPATIENT
Start: 2017-02-07 | End: 2017-02-09

## 2017-02-07 RX ORDER — SODIUM CHLORIDE 9 MG/ML
1000 INJECTION, SOLUTION INTRAVENOUS
Qty: 0 | Refills: 0 | Status: DISCONTINUED | OUTPATIENT
Start: 2017-02-07 | End: 2017-02-08

## 2017-02-07 RX ORDER — IPRATROPIUM/ALBUTEROL SULFATE 18-103MCG
3 AEROSOL WITH ADAPTER (GRAM) INHALATION EVERY 6 HOURS
Qty: 0 | Refills: 0 | Status: DISCONTINUED | OUTPATIENT
Start: 2017-02-07 | End: 2017-02-15

## 2017-02-07 RX ORDER — HYDROMORPHONE HYDROCHLORIDE 2 MG/ML
2 INJECTION INTRAMUSCULAR; INTRAVENOUS; SUBCUTANEOUS EVERY 4 HOURS
Qty: 0 | Refills: 0 | Status: DISCONTINUED | OUTPATIENT
Start: 2017-02-07 | End: 2017-02-07

## 2017-02-07 RX ORDER — VANCOMYCIN HCL 1 G
1500 VIAL (EA) INTRAVENOUS EVERY 12 HOURS
Qty: 0 | Refills: 0 | Status: DISCONTINUED | OUTPATIENT
Start: 2017-02-07 | End: 2017-02-09

## 2017-02-07 RX ORDER — ONDANSETRON 8 MG/1
4 TABLET, FILM COATED ORAL EVERY 6 HOURS
Qty: 0 | Refills: 0 | Status: DISCONTINUED | OUTPATIENT
Start: 2017-02-07 | End: 2017-02-15

## 2017-02-07 RX ADMIN — HEPARIN SODIUM 5000 UNIT(S): 5000 INJECTION INTRAVENOUS; SUBCUTANEOUS at 21:50

## 2017-02-07 RX ADMIN — Medication 300 MILLIGRAM(S): at 04:57

## 2017-02-07 RX ADMIN — HYDROMORPHONE HYDROCHLORIDE 0.5 MILLIGRAM(S): 2 INJECTION INTRAMUSCULAR; INTRAVENOUS; SUBCUTANEOUS at 12:12

## 2017-02-07 RX ADMIN — HYDROMORPHONE HYDROCHLORIDE 1 MILLIGRAM(S): 2 INJECTION INTRAMUSCULAR; INTRAVENOUS; SUBCUTANEOUS at 23:02

## 2017-02-07 RX ADMIN — ONDANSETRON 4 MILLIGRAM(S): 8 TABLET, FILM COATED ORAL at 19:02

## 2017-02-07 RX ADMIN — SODIUM CHLORIDE 125 MILLILITER(S): 9 INJECTION, SOLUTION INTRAVENOUS at 10:08

## 2017-02-07 RX ADMIN — HYDROMORPHONE HYDROCHLORIDE 1 MILLIGRAM(S): 2 INJECTION INTRAMUSCULAR; INTRAVENOUS; SUBCUTANEOUS at 19:02

## 2017-02-07 RX ADMIN — Medication 2: at 19:01

## 2017-02-07 RX ADMIN — Medication 2: at 23:01

## 2017-02-07 RX ADMIN — Medication 300 MILLIGRAM(S): at 19:01

## 2017-02-07 RX ADMIN — Medication 100 MILLIGRAM(S): at 08:35

## 2017-02-07 RX ADMIN — ONDANSETRON 4 MILLIGRAM(S): 8 TABLET, FILM COATED ORAL at 10:08

## 2017-02-07 RX ADMIN — HYDROMORPHONE HYDROCHLORIDE 1 MILLIGRAM(S): 2 INJECTION INTRAMUSCULAR; INTRAVENOUS; SUBCUTANEOUS at 10:40

## 2017-02-07 RX ADMIN — HYDROMORPHONE HYDROCHLORIDE 1 MILLIGRAM(S): 2 INJECTION INTRAMUSCULAR; INTRAVENOUS; SUBCUTANEOUS at 10:08

## 2017-02-07 RX ADMIN — HEPARIN SODIUM 5000 UNIT(S): 5000 INJECTION INTRAVENOUS; SUBCUTANEOUS at 14:35

## 2017-02-07 RX ADMIN — Medication 100 MILLIGRAM(S): at 21:49

## 2017-02-07 RX ADMIN — ERTAPENEM SODIUM 120 MILLIGRAM(S): 1 INJECTION, POWDER, LYOPHILIZED, FOR SOLUTION INTRAMUSCULAR; INTRAVENOUS at 06:50

## 2017-02-07 RX ADMIN — HYDROMORPHONE HYDROCHLORIDE 1 MILLIGRAM(S): 2 INJECTION INTRAMUSCULAR; INTRAVENOUS; SUBCUTANEOUS at 14:35

## 2017-02-07 RX ADMIN — HYDROMORPHONE HYDROCHLORIDE 1 MILLIGRAM(S): 2 INJECTION INTRAMUSCULAR; INTRAVENOUS; SUBCUTANEOUS at 15:00

## 2017-02-07 RX ADMIN — Medication 200 MILLIGRAM(S): at 22:53

## 2017-02-07 RX ADMIN — HYDROMORPHONE HYDROCHLORIDE 1 MILLIGRAM(S): 2 INJECTION INTRAMUSCULAR; INTRAVENOUS; SUBCUTANEOUS at 23:50

## 2017-02-07 NOTE — PROGRESS NOTE ADULT - SUBJECTIVE AND OBJECTIVE BOX
O/N: admitted for anterior abdominal abscess, POD 31 s/p ventral hernia repair.     SUBJECTIVE:  Flatus: [ ] YES [ ] NO             Bowel Movement: [ ] YES [ ] NO  Pain (0-10):            Pain Control Adequate: [ ] YES [ x] NO  Nausea: [ ] YES [ x] NO            Vomiting: [ ] YES [x ] NO  Diarrhea: [ ] YES [ ] NO         Constipation: [ ] YES [ ] NO     Chest Pain: [ ] YES [x ] NO    SOB:  [ ] YES [ x] NO    MEDICATIONS  (STANDING):  heparin  Injectable 5000Unit(s) SubCutaneous every 8 hours  lactated ringers. 1000milliLiter(s) IV Continuous <Continuous>  insulin lispro (HumaLOG) corrective regimen sliding scale  SubCutaneous every 4 hours  dextrose 5%. 1000milliLiter(s) IV Continuous <Continuous>  dextrose 50% Injectable 12.5Gram(s) IV Push once  metoprolol 100milliGRAM(s) Oral daily  vancomycin  IVPB 1500milliGRAM(s) IV Intermittent every 12 hours    MEDICATIONS  (PRN):  dextrose Gel 1Dose(s) Oral once PRN Blood Glucose LESS THAN 70 milliGRAM(s)/deciliter  glucagon  Injectable 1milliGRAM(s) IntraMuscular once PRN Glucose LESS THAN 70 milligrams/deciliter  ALBUTerol/ipratropium for Nebulization 3milliLiter(s) Nebulizer every 6 hours PRN Shortness of Breath and/or Wheezing  HYDROmorphone  Injectable 0.5milliGRAM(s) IV Push every 4 hours PRN Moderate Pain (4 - 6)  HYDROmorphone  Injectable 1milliGRAM(s) IV Push every 4 hours PRN Severe Pain (7 - 10)  ondansetron Injectable 4milliGRAM(s) IV Push every 6 hours PRN Nausea and/or Vomiting      Vital Signs Last 24 Hrs  T(C): 37.1, Max: 37.2 (02-06 @ 23:44)  T(F): 98.7, Max: 99 (02-06 @ 23:44)  HR: 90 (71 - 104)  BP: 127/82 (124/78 - 149/74)  BP(mean): --  RR: 16 (16 - 18)  SpO2: 99% (98% - 100%)    Physical Exam:  General: mild distress  Pulmonary: Nonlabored breathing, no respiratory distress  Cardiovascular: NSR  Abdominal: soft, obese, low midline pelvic incision with staples, oozing, erythematous, TTP, no bleeding  Extremities: WWP, normal strength  Neuro: A/O x 3, CNs II-XII grossly intact, no focal deficits, normal motor/sensation  Pulses: palpable distal pulses    I&O's Summary      LABS:                        10.9   15.0  )-----------( 376      ( 2017 06:06 )             34.2     2017 06:06    140    |  104    |  6      ----------------------------<  214    3.4     |  25     |  0.64     Ca    7.8        2017 06:06  Phos  2.8       2017 06:06  Mg     1.9       2017 06:06    TPro  7.7    /  Alb  2.7    /  TBili  0.2    /  DBili  x      /  AST  9      /  ALT  15     /  AlkPhos  132    2017 22:24    PT/INR - ( 2017 06:06 )   PT: 14.0 sec;   INR: 1.26            Urinalysis Basic - ( 2017 22:17 )    Color: Yellow / Appearance: SL CLOUDY / S.025 / pH: x  Gluc: x / Ketone: NEGATIVE  / Bili: NEGATIVE / Urobili: 0.2 E.U./dL   Blood: x / Protein: NEGATIVE mg/dL / Nitrite: NEGATIVE   Leuk Esterase: Small / RBC: < 5 /HPF / WBC > 10 /HPF   Sq Epi: x / Non Sq Epi: Moderate /HPF / Bacteria: Present /HPF      CAPILLARY BLOOD GLUCOSE    LIVER FUNCTIONS - ( 2017 22:24 )  Alb: 2.7 g/dL / Pro: 7.7 g/dL / ALK PHOS: 132 U/L / ALT: 15 U/L / AST: 9 U/L / GGT: x             RADIOLOGY & ADDITIONAL STUDIES:

## 2017-02-07 NOTE — H&P ADULT. - HISTORY OF PRESENT ILLNESS
36F non compliant patient w/PMH DM, MO, HLD, smoker, csection x 2, POD 31 ventral hernia repair presents to [facility] with complaints of worsening abdominal pain for the past 4 days, at her incisional site, assc w/chills, nausea, vomiting, diarrhea. Patient was previously prescirbed a course of antibiotics but never filled them in the pharmacy.  Has not been able to follow up with Dr. Hurtado. Says that she has noticed some foul smell come from the wound and mild drainage. Denies cp/sob/palp.

## 2017-02-07 NOTE — CONSULT NOTE ADULT - SUBJECTIVE AND OBJECTIVE BOX
HPI:  37 yo F with HTN, DM, HLD, morbid obesity, h/o CS X 2 admitted with SSI.  Initially admitted 17 with lower midline ventral hernia, increased pain, N, V, leukocytosis and lactic acidosis with loop of bowel in hernia.  On , she underwent Pfannenstiel incisional hernia repair with mesh – 8 X 12 cm defect.  She was d/dina  with WBC 14K and ABBIE drain in place.  She was readmitted  with increasing pain and foul smell from wound with mild drainage.  CT showed ventral hernia repair with subcutaneous drainage catheter in place. No discrete fluid collection (noncontrast study).  Was felt to have superficial SSI – treated with clinda.  Was seen on  – after she pulled out drain with stitch.  Was given cipro X 1 dose and prescription that she did not fill.  Returned today with 4 d h/o worsening abd pain, n, v, chills, diarrhea.  CT showed a new 12 X 7 X 18 cm fluid collection in subcutaneous tissue of anterior pelvic wall c/c abscess, as well as an additional 2 X 9 X 8 cm collection surrounding hernia repair mesh in the anterior abd wall that communicates with the subcutaneous collection.  She underwent IR drainage with drain placement this evening.      PAST MEDICAL & SURGICAL HISTORY:  Abdominal hernia  Hernia  Obesity  Diabetes  Hyperlipidemia  Essential hypertension  H/O ventral hernia repair  H/O:   History of D&amp;C  H/O LEEP    MEDICATIONS  (STANDING):  heparin  Injectable 5000Unit(s) SubCutaneous every 8 hours  lactated ringers. 1000milliLiter(s) IV Continuous <Continuous>  insulin lispro (HumaLOG) corrective regimen sliding scale  SubCutaneous every 4 hours  dextrose 5%. 1000milliLiter(s) IV Continuous <Continuous>  dextrose 50% Injectable 12.5Gram(s) IV Push once  metoprolol 100milliGRAM(s) Oral daily  vancomycin  IVPB 1500milliGRAM(s) IV Intermittent every 12 hours  ciprofloxacin   IVPB  IV Intermittent   metroNIDAZOLE  IVPB  IV Intermittent   ciprofloxacin   IVPB 400milliGRAM(s) IV Intermittent once    MEDICATIONS  (PRN):  dextrose Gel 1Dose(s) Oral once PRN Blood Glucose LESS THAN 70 milliGRAM(s)/deciliter  glucagon  Injectable 1milliGRAM(s) IntraMuscular once PRN Glucose LESS THAN 70 milligrams/deciliter  ALBUTerol/ipratropium for Nebulization 3milliLiter(s) Nebulizer every 6 hours PRN Shortness of Breath and/or Wheezing  HYDROmorphone  Injectable 0.5milliGRAM(s) IV Push every 4 hours PRN Moderate Pain (4 - 6)  HYDROmorphone  Injectable 1milliGRAM(s) IV Push every 4 hours PRN Severe Pain (7 - 10)  ondansetron Injectable 4milliGRAM(s) IV Push every 6 hours PRN Nausea and/or Vomiting      Allergies    amoxicillin (Unknown)  iodine containing compounds (Hives; Anaphylaxis)  penicillin (Hives)  Hives, mucosal swelling  shellfish. (Hives; Anaphylaxis)      SOCIAL HISTORY:    FAMILY HISTORY:  No pertinent family history in first degree relatives      Vital Signs Last 24 Hrs  T(C): 36.3, Max: 37.2 (- @ 23:44)  T(F): 97.4, Max: 99 (02- @ 23:44)  HR: 85 (71 - 99)  BP: 130/86 (124/78 - 140/68)  BP(mean): --  RR: 16 (16 - 18)  SpO2: 98% (98% - 100%)    PE:  Sitting on side of bed, non-toxic appearing.  HEENT:  NC, PERRL, sclerae anicteric, conjunctivae clear, EOMI.  Sinuses nontender, no nasal exudate.  No buccal or pharyngeal lesions, erythema or exudate  Neck:  Supple, no adenopathy  Lungs:  Clear to auscultation  Cor:  RRR, S1, S2, no murmur appreciated  Abd:  Obese, symmetric, normoactive BS.  Suprapubic incision with staples in place, some sanguinous material from left side of incision, drain in RLQ with sanguinous drainage.  Markedly tender to palpation below umbilicus.  Large pannus overhanging incision.  Extrem:  No cyanosis or edema  Skin:  No rashes.    LABS:                        10.9   15.0  )-----------( 376      ( 2017 06:06 )             34.2     2017 06:06    140    |  104    |  6      ----------------------------<  214    3.4     |  25     |  0.64     Ca    7.8        2017 06:06  Phos  2.8       2017 06:06  Mg     1.9       2017 06:06    TPro  7.7    /  Alb  2.7    /  TBili  0.2    /  DBili  x      /  AST  9      /  ALT  15     /  AlkPhos  132    2017 22:24    Urinalysis Basic - ( 2017 10:43 )    Color: Yellow / Appearance: Clear / S.020 / pH: x  Gluc: x / Ketone: NEGATIVE  / Bili: NEGATIVE / Urobili: 0.2 E.U./dL   Blood: x / Protein: NEGATIVE mg/dL / Nitrite: NEGATIVE   Leuk Esterase: Trace / RBC: < 5 /HPF / WBC Many /HPF   Sq Epi: x / Non Sq Epi: Moderate /HPF / Bacteria: Present /HPF    Blood cx 2/7 X 2-pending     RADIOLOGY & ADDITIONAL STUDIES:  EXAM:  CT ABDOMEN AND PELVIS                           PROCEDURE DATE:  2017  Abdominal wall/Soft tissues: Status post pelvic ventral hernia repair.   There is a new 11.8 x 7.1 x 18.5 cm fluid collection with air-fluid level   in the subcutaneous tissue of the anterior pelvic wall consistent with   abscess. Additional collection with air-fluid level surrounding the   hernia repair mesh in the anterior abdominal wall measuring 1.6 x 8.9 x   7.7 cm communicates with the subcutaneous collection. There is associated   surrounding fat stranding. Pelvic wall skin staples are again noted.

## 2017-02-07 NOTE — CONSULT NOTE ADULT - ASSESSMENT
Superficial SSI s/p repair of ventral hernia - now s/p drainage of large abscess communicating with mesh.  Is afebrile but with leukocytosis.  Need to cover for Staph, as well as for  chrystal.  She is hemodynamically stable and relatively well appearing systemically.   Would be hesitant to give cephalosporin based upon history of penicillin allergy without testing, though she does not report anaphylaxis.  Sugg:  - F/u blood and abscess cultures  - Vancomycin 1.5 g iv q12 h.  Please get trough prior to 4th dose.  - Ciprofloxacin - would give iv for now - 400 mg iv q12 h  - Metronidazole - also for now would give iv - 500 mg iv q8h  Recommendations discussed with primary team and ID resident (Dr. Gould).  Will follow with you.

## 2017-02-07 NOTE — H&P ADULT. - ASSESSMENT
36F non compliant patient with above history now with infected seroma versus abscess    -Admit to [facility]  -NPO  -IVF  -Vanc/Zosyn  -IR for abscess drainage and drain placement  -Home meds  -DVT ppx  -Pain/nausea control prn  -OOB ambulation

## 2017-02-07 NOTE — PROGRESS NOTE ADULT - ASSESSMENT
36F with infected seroma versus abscess, POD 31 s/p ventral hernia repair    -NPO   -IVF  -Vanc  - due to PCN allergy, will consult ID for ertapenam possibly  - consult IR for abscess drainage and drain placement  -Home meds  -DVT ppx  -Pain/nausea control prn  -OOB ambulation

## 2017-02-07 NOTE — ED ADULT NURSE REASSESSMENT NOTE - NS ED NURSE REASSESS COMMENT FT1
Report given to IR RN. Plan is for drain placement later today. At 0830 on receiving patient, she is complaining of pain. Surgery team paged and orders will be released. Report given to Elsie ROBERTS.
Pt c/o of nausea and pain. Called placed to surgery team for new orders for pain med and antinausea med. Pt verbalized understanding.

## 2017-02-08 LAB
ANION GAP SERPL CALC-SCNC: 9 MMOL/L — SIGNIFICANT CHANGE UP (ref 9–16)
BUN SERPL-MCNC: 4 MG/DL — LOW (ref 7–23)
CALCIUM SERPL-MCNC: 8.6 MG/DL — SIGNIFICANT CHANGE UP (ref 8.5–10.5)
CHLORIDE SERPL-SCNC: 101 MMOL/L — SIGNIFICANT CHANGE UP (ref 96–108)
CO2 SERPL-SCNC: 27 MMOL/L — SIGNIFICANT CHANGE UP (ref 22–31)
CREAT SERPL-MCNC: 0.61 MG/DL — SIGNIFICANT CHANGE UP (ref 0.5–1.3)
CULTURE RESULTS: SIGNIFICANT CHANGE UP
GLUCOSE SERPL-MCNC: 154 MG/DL — HIGH (ref 70–99)
GRAM STN FLD: SIGNIFICANT CHANGE UP
HBA1C BLD-MCNC: 7.9 % — HIGH (ref 4.8–5.6)
HCT VFR BLD CALC: 35.9 % — SIGNIFICANT CHANGE UP (ref 34.5–45)
HGB BLD-MCNC: 11.6 G/DL — SIGNIFICANT CHANGE UP (ref 11.5–15.5)
MAGNESIUM SERPL-MCNC: 2.1 MG/DL — SIGNIFICANT CHANGE UP (ref 1.6–2.4)
MCHC RBC-ENTMCNC: 25.2 PG — LOW (ref 27–34)
MCHC RBC-ENTMCNC: 32.3 G/DL — SIGNIFICANT CHANGE UP (ref 32–36)
MCV RBC AUTO: 77.9 FL — LOW (ref 80–100)
PHOSPHATE SERPL-MCNC: 3.3 MG/DL — SIGNIFICANT CHANGE UP (ref 2.5–4.5)
PLATELET # BLD AUTO: 396 K/UL — SIGNIFICANT CHANGE UP (ref 150–400)
POTASSIUM SERPL-MCNC: 3.1 MMOL/L — LOW (ref 3.5–5.3)
POTASSIUM SERPL-SCNC: 3.1 MMOL/L — LOW (ref 3.5–5.3)
RBC # BLD: 4.61 M/UL — SIGNIFICANT CHANGE UP (ref 3.8–5.2)
RBC # FLD: 15.2 % — SIGNIFICANT CHANGE UP (ref 10.3–16.9)
SODIUM SERPL-SCNC: 137 MMOL/L — SIGNIFICANT CHANGE UP (ref 135–145)
SPECIMEN SOURCE: SIGNIFICANT CHANGE UP
SPECIMEN SOURCE: SIGNIFICANT CHANGE UP
WBC # BLD: 9.9 K/UL — SIGNIFICANT CHANGE UP (ref 3.8–10.5)
WBC # FLD AUTO: 9.9 K/UL — SIGNIFICANT CHANGE UP (ref 3.8–10.5)

## 2017-02-08 RX ORDER — POTASSIUM CHLORIDE 20 MEQ
40 PACKET (EA) ORAL ONCE
Qty: 0 | Refills: 0 | Status: COMPLETED | OUTPATIENT
Start: 2017-02-08 | End: 2017-02-08

## 2017-02-08 RX ORDER — METOCLOPRAMIDE HCL 10 MG
10 TABLET ORAL EVERY 6 HOURS
Qty: 0 | Refills: 0 | Status: DISCONTINUED | OUTPATIENT
Start: 2017-02-08 | End: 2017-02-15

## 2017-02-08 RX ADMIN — ONDANSETRON 4 MILLIGRAM(S): 8 TABLET, FILM COATED ORAL at 13:44

## 2017-02-08 RX ADMIN — Medication 10 MILLIGRAM(S): at 18:20

## 2017-02-08 RX ADMIN — HYDROMORPHONE HYDROCHLORIDE 1 MILLIGRAM(S): 2 INJECTION INTRAMUSCULAR; INTRAVENOUS; SUBCUTANEOUS at 22:30

## 2017-02-08 RX ADMIN — HEPARIN SODIUM 5000 UNIT(S): 5000 INJECTION INTRAVENOUS; SUBCUTANEOUS at 05:50

## 2017-02-08 RX ADMIN — Medication 2: at 08:18

## 2017-02-08 RX ADMIN — ONDANSETRON 4 MILLIGRAM(S): 8 TABLET, FILM COATED ORAL at 22:20

## 2017-02-08 RX ADMIN — HYDROMORPHONE HYDROCHLORIDE 1 MILLIGRAM(S): 2 INJECTION INTRAMUSCULAR; INTRAVENOUS; SUBCUTANEOUS at 22:15

## 2017-02-08 RX ADMIN — HYDROMORPHONE HYDROCHLORIDE 1 MILLIGRAM(S): 2 INJECTION INTRAMUSCULAR; INTRAVENOUS; SUBCUTANEOUS at 19:10

## 2017-02-08 RX ADMIN — Medication 100 MILLIGRAM(S): at 14:23

## 2017-02-08 RX ADMIN — Medication 100 MILLIGRAM(S): at 22:16

## 2017-02-08 RX ADMIN — HYDROMORPHONE HYDROCHLORIDE 1 MILLIGRAM(S): 2 INJECTION INTRAMUSCULAR; INTRAVENOUS; SUBCUTANEOUS at 12:00

## 2017-02-08 RX ADMIN — Medication 300 MILLIGRAM(S): at 07:15

## 2017-02-08 RX ADMIN — HEPARIN SODIUM 5000 UNIT(S): 5000 INJECTION INTRAVENOUS; SUBCUTANEOUS at 22:15

## 2017-02-08 RX ADMIN — Medication 2: at 03:44

## 2017-02-08 RX ADMIN — HEPARIN SODIUM 5000 UNIT(S): 5000 INJECTION INTRAVENOUS; SUBCUTANEOUS at 14:23

## 2017-02-08 RX ADMIN — Medication 4: at 11:26

## 2017-02-08 RX ADMIN — Medication 4: at 22:15

## 2017-02-08 RX ADMIN — Medication 2: at 18:19

## 2017-02-08 RX ADMIN — HYDROMORPHONE HYDROCHLORIDE 1 MILLIGRAM(S): 2 INJECTION INTRAMUSCULAR; INTRAVENOUS; SUBCUTANEOUS at 18:20

## 2017-02-08 RX ADMIN — Medication 200 MILLIGRAM(S): at 18:19

## 2017-02-08 RX ADMIN — Medication 100 MILLIGRAM(S): at 06:06

## 2017-02-08 RX ADMIN — ONDANSETRON 4 MILLIGRAM(S): 8 TABLET, FILM COATED ORAL at 06:06

## 2017-02-08 RX ADMIN — Medication 200 MILLIGRAM(S): at 11:27

## 2017-02-08 RX ADMIN — Medication 1 APPLICATION(S): at 18:20

## 2017-02-08 RX ADMIN — Medication 100 MILLIGRAM(S): at 05:51

## 2017-02-08 RX ADMIN — HYDROMORPHONE HYDROCHLORIDE 1 MILLIGRAM(S): 2 INJECTION INTRAMUSCULAR; INTRAVENOUS; SUBCUTANEOUS at 06:15

## 2017-02-08 RX ADMIN — Medication 300 MILLIGRAM(S): at 18:20

## 2017-02-08 RX ADMIN — HYDROMORPHONE HYDROCHLORIDE 1 MILLIGRAM(S): 2 INJECTION INTRAMUSCULAR; INTRAVENOUS; SUBCUTANEOUS at 05:51

## 2017-02-08 RX ADMIN — HYDROMORPHONE HYDROCHLORIDE 1 MILLIGRAM(S): 2 INJECTION INTRAMUSCULAR; INTRAVENOUS; SUBCUTANEOUS at 11:27

## 2017-02-08 NOTE — PROGRESS NOTE ADULT - SUBJECTIVE AND OBJECTIVE BOX
36 year old female status post catheter drainage of  anterior abd wall abscess. Patient doing well. Catheter flushed with 5 cc NS without resistance.    Vital Signs Last 24 Hrs  T(C): 36.4, Max: 36.7 (02-07 @ 21:00)  T(F): 97.6, Max: 98 (02-07 @ 21:00)  HR: 82 (74 - 82)  BP: 134/78 (120/81 - 148/80)  BP(mean): --  RR: 18 (16 - 18)  SpO2: 100% (96% - 100%)                          11.6   9.9   )-----------( 396      ( 08 Feb 2017 06:33 )             35.9   I&O's Detail  I & Os for 24h ending 08 Feb 2017 07:00  =============================================  IN:    lactated ringers.: 1625 ml    IV PiggyBack: 1000 ml    Total IN: 2625 ml  ---------------------------------------------  OUT:    Voided: 1400 ml    Bulb: 280 ml    Total OUT: 1680 ml  ---------------------------------------------  Total NET: 945 ml    I & Os for current day (as of 08 Feb 2017 16:26)  =============================================  IN:    IV PiggyBack: 150 ml    Total IN: 150 ml  ---------------------------------------------  OUT:    Voided: 500 ml    Bulb: 50 ml    Total OUT: 550 ml  ---------------------------------------------  Total NET: -400 ml

## 2017-02-08 NOTE — PROGRESS NOTE ADULT - ASSESSMENT
36F s/p ventral hernia repair with infected seroma versus abscess, now s/p IR drainage    -CLD  -Vanc/cipro/flagyl  -f/u Cx  -Home meds  -DVT ppx  -Pain/nausea control prn  -OOB ambulation 36F s/p ventral hernia repair with infected seroma versus abscess, now s/p IR drainage    -CLD- advance diet  -Vanc/cipro/flagyl  -f/u Cx  -Home meds  -DVT ppx  -Pain/nausea control prn  -OOB ambulation

## 2017-02-08 NOTE — PROGRESS NOTE ADULT - SUBJECTIVE AND OBJECTIVE BOX
O/N: Per ID cont vanc, and started cipro/flagyl, FAUSTINO.  2/7: s/p IR drainage to anterior abd wall abscess. Continue Vanco. ID consulted for gram neg coverage. Feeling better. O/N: Per ID cont vanc, and started cipro/flagyl, FAUSTINO.  : s/p IR drainage to anterior abd wall abscess. Continue Vanco. ID consulted for gram neg coverage. Feeling better.         SUBJECTIVE:  Flatus: [x ] YES [ ] NO             Bowel Movement: [x ] YES [ ] NO  Pain (0-10):            Pain Control Adequate: [ x] YES [ ] NO  Nausea: [ ] YES [x ] NO            Vomiting: [ ] YES [x ] NO  Diarrhea: [ ] YES [ x] NO         Constipation: [ ] YES [x ] NO     Chest Pain: [ ] YES x[ ] NO    SOB:  [ ] YES [x ] NO    MEDICATIONS  (STANDING):  heparin  Injectable 5000Unit(s) SubCutaneous every 8 hours  insulin lispro (HumaLOG) corrective regimen sliding scale  SubCutaneous every 4 hours  dextrose 5%. 1000milliLiter(s) IV Continuous <Continuous>  dextrose 50% Injectable 12.5Gram(s) IV Push once  metoprolol 100milliGRAM(s) Oral daily  vancomycin  IVPB 1500milliGRAM(s) IV Intermittent every 12 hours  ciprofloxacin   IVPB  IV Intermittent   metroNIDAZOLE  IVPB  IV Intermittent   ciprofloxacin   IVPB 400milliGRAM(s) IV Intermittent every 12 hours  metroNIDAZOLE  IVPB 500milliGRAM(s) IV Intermittent every 8 hours    MEDICATIONS  (PRN):  dextrose Gel 1Dose(s) Oral once PRN Blood Glucose LESS THAN 70 milliGRAM(s)/deciliter  glucagon  Injectable 1milliGRAM(s) IntraMuscular once PRN Glucose LESS THAN 70 milligrams/deciliter  ALBUTerol/ipratropium for Nebulization 3milliLiter(s) Nebulizer every 6 hours PRN Shortness of Breath and/or Wheezing  HYDROmorphone  Injectable 0.5milliGRAM(s) IV Push every 4 hours PRN Moderate Pain (4 - 6)  HYDROmorphone  Injectable 1milliGRAM(s) IV Push every 4 hours PRN Severe Pain (7 - 10)  ondansetron Injectable 4milliGRAM(s) IV Push every 6 hours PRN Nausea and/or Vomiting      Vital Signs Last 24 Hrs  T(C): 36.1, Max: 37.1 ( @ 08:35)  T(F): 97, Max: 98.7 ( @ 08:35)  HR: 78 (78 - 90)  BP: 120/81 (120/81 - 148/80)  BP(mean): --  RR: 18 (16 - 18)  SpO2: 96% (96% - 100%)    PHYSICAL EXAM:      Constitutional: A&Ox3    Respiratory: non labored breathing, no respiratory distress    Cardiovascular: NSR, RRR    Gastrointestinal: soft, non distended, + TTP to ABBIE site                 Incision: CDI, staples in place, will remove today; ABBIE drain- purulent/sanguinous     Genitourinary: voiding     Extremities: (-) edema            I&O's Detail  I & Os for 24h ending 2017 07:00  =============================================  IN:    lactated ringers.: 1625 ml    IV PiggyBack: 1000 ml    Total IN: 2625 ml  ---------------------------------------------  OUT:    Voided: 1400 ml    Bulb: 280 ml    Total OUT: 1680 ml  ---------------------------------------------  Total NET: 945 ml    I & Os for current day (as of 2017 07:51)  =============================================  IN:    IV PiggyBack: 150 ml    Total IN: 150 ml  ---------------------------------------------  OUT:    Total OUT: 0 ml  ---------------------------------------------  Total NET: 150 ml      LABS:                        11.6   9.9   )-----------( 396      ( 2017 06:33 )             35.9     2017 06:33    137    |  101    |  4      ----------------------------<  154    3.1     |  27     |  0.61     Ca    8.6        2017 06:33  Phos  3.3       2017 06:33  Mg     2.1       2017 06:33    TPro  7.7    /  Alb  2.7    /  TBili  0.2    /  DBili  x      /  AST  9      /  ALT  15     /  AlkPhos  132    2017 22:24    PT/INR - ( 2017 06:06 )   PT: 14.0 sec;   INR: 1.26            Urinalysis Basic - ( 2017 10:43 )    Color: Yellow / Appearance: Clear / S.020 / pH: x  Gluc: x / Ketone: NEGATIVE  / Bili: NEGATIVE / Urobili: 0.2 E.U./dL   Blood: x / Protein: NEGATIVE mg/dL / Nitrite: NEGATIVE   Leuk Esterase: Trace / RBC: < 5 /HPF / WBC Many /HPF   Sq Epi: x / Non Sq Epi: Moderate /HPF / Bacteria: Present /HPF        RADIOLOGY & ADDITIONAL STUDIES:

## 2017-02-09 LAB
ANION GAP SERPL CALC-SCNC: 11 MMOL/L — SIGNIFICANT CHANGE UP (ref 9–16)
APPEARANCE UR: CLEAR — SIGNIFICANT CHANGE UP
BILIRUB UR-MCNC: NEGATIVE — SIGNIFICANT CHANGE UP
BUN SERPL-MCNC: 5 MG/DL — LOW (ref 7–23)
CALCIUM SERPL-MCNC: 8.2 MG/DL — LOW (ref 8.5–10.5)
CHLORIDE SERPL-SCNC: 103 MMOL/L — SIGNIFICANT CHANGE UP (ref 96–108)
CO2 SERPL-SCNC: 28 MMOL/L — SIGNIFICANT CHANGE UP (ref 22–31)
COLOR SPEC: YELLOW — SIGNIFICANT CHANGE UP
CREAT SERPL-MCNC: 0.7 MG/DL — SIGNIFICANT CHANGE UP (ref 0.5–1.3)
DIFF PNL FLD: NEGATIVE — SIGNIFICANT CHANGE UP
GLUCOSE SERPL-MCNC: 135 MG/DL — HIGH (ref 70–99)
GLUCOSE UR QL: NEGATIVE — SIGNIFICANT CHANGE UP
HCT VFR BLD CALC: 36.8 % — SIGNIFICANT CHANGE UP (ref 34.5–45)
HGB BLD-MCNC: 11.7 G/DL — SIGNIFICANT CHANGE UP (ref 11.5–15.5)
KETONES UR-MCNC: NEGATIVE — SIGNIFICANT CHANGE UP
LEUKOCYTE ESTERASE UR-ACNC: NEGATIVE — SIGNIFICANT CHANGE UP
MAGNESIUM SERPL-MCNC: 2.1 MG/DL — SIGNIFICANT CHANGE UP (ref 1.6–2.4)
MCHC RBC-ENTMCNC: 24.7 PG — LOW (ref 27–34)
MCHC RBC-ENTMCNC: 31.8 G/DL — LOW (ref 32–36)
MCV RBC AUTO: 77.8 FL — LOW (ref 80–100)
NITRITE UR-MCNC: NEGATIVE — SIGNIFICANT CHANGE UP
PH UR: 6.5 — SIGNIFICANT CHANGE UP (ref 4–8)
PHOSPHATE SERPL-MCNC: 3.7 MG/DL — SIGNIFICANT CHANGE UP (ref 2.5–4.5)
PLATELET # BLD AUTO: 463 K/UL — HIGH (ref 150–400)
POTASSIUM SERPL-MCNC: 3.6 MMOL/L — SIGNIFICANT CHANGE UP (ref 3.5–5.3)
POTASSIUM SERPL-SCNC: 3.6 MMOL/L — SIGNIFICANT CHANGE UP (ref 3.5–5.3)
PROT UR-MCNC: NEGATIVE MG/DL — SIGNIFICANT CHANGE UP
RBC # BLD: 4.73 M/UL — SIGNIFICANT CHANGE UP (ref 3.8–5.2)
RBC # FLD: 15.2 % — SIGNIFICANT CHANGE UP (ref 10.3–16.9)
SODIUM SERPL-SCNC: 142 MMOL/L — SIGNIFICANT CHANGE UP (ref 135–145)
SP GR SPEC: 1.02 — SIGNIFICANT CHANGE UP (ref 1–1.03)
UROBILINOGEN FLD QL: 0.2 E.U./DL — SIGNIFICANT CHANGE UP
VANCOMYCIN TROUGH SERPL-MCNC: 20.8 UG/ML — HIGH (ref 10–20)
WBC # BLD: 9.1 K/UL — SIGNIFICANT CHANGE UP (ref 3.8–10.5)
WBC # FLD AUTO: 9.1 K/UL — SIGNIFICANT CHANGE UP (ref 3.8–10.5)

## 2017-02-09 RX ORDER — VANCOMYCIN HCL 1 G
1250 VIAL (EA) INTRAVENOUS EVERY 12 HOURS
Qty: 0 | Refills: 0 | Status: DISCONTINUED | OUTPATIENT
Start: 2017-02-09 | End: 2017-02-13

## 2017-02-09 RX ADMIN — HEPARIN SODIUM 5000 UNIT(S): 5000 INJECTION INTRAVENOUS; SUBCUTANEOUS at 06:49

## 2017-02-09 RX ADMIN — Medication 100 MILLIGRAM(S): at 13:27

## 2017-02-09 RX ADMIN — Medication 300 MILLIGRAM(S): at 09:23

## 2017-02-09 RX ADMIN — Medication 2: at 14:28

## 2017-02-09 RX ADMIN — Medication 100 MILLIGRAM(S): at 06:49

## 2017-02-09 RX ADMIN — Medication 100 MILLIGRAM(S): at 07:13

## 2017-02-09 RX ADMIN — Medication 100 MILLIGRAM(S): at 21:50

## 2017-02-09 RX ADMIN — HYDROMORPHONE HYDROCHLORIDE 1 MILLIGRAM(S): 2 INJECTION INTRAMUSCULAR; INTRAVENOUS; SUBCUTANEOUS at 07:12

## 2017-02-09 RX ADMIN — Medication 10 MILLIGRAM(S): at 06:49

## 2017-02-09 RX ADMIN — Medication 1 APPLICATION(S): at 06:50

## 2017-02-09 RX ADMIN — HEPARIN SODIUM 5000 UNIT(S): 5000 INJECTION INTRAVENOUS; SUBCUTANEOUS at 13:27

## 2017-02-09 RX ADMIN — HYDROMORPHONE HYDROCHLORIDE 1 MILLIGRAM(S): 2 INJECTION INTRAMUSCULAR; INTRAVENOUS; SUBCUTANEOUS at 06:47

## 2017-02-09 RX ADMIN — Medication 10 MILLIGRAM(S): at 13:37

## 2017-02-09 RX ADMIN — Medication 2: at 08:14

## 2017-02-09 RX ADMIN — Medication 200 MILLIGRAM(S): at 18:01

## 2017-02-09 RX ADMIN — Medication 1 APPLICATION(S): at 18:01

## 2017-02-09 RX ADMIN — HEPARIN SODIUM 5000 UNIT(S): 5000 INJECTION INTRAVENOUS; SUBCUTANEOUS at 21:58

## 2017-02-09 RX ADMIN — Medication: at 10:00

## 2017-02-09 RX ADMIN — Medication 200 MILLIGRAM(S): at 06:50

## 2017-02-09 RX ADMIN — Medication 2: at 21:58

## 2017-02-09 NOTE — PROGRESS NOTE ADULT - SUBJECTIVE AND OBJECTIVE BOX
INTERVAL HPI/OVERNIGHT EVENTS:    intra-abdominal collection drained of 700 ml of purulent fluid on 2/7.  ABBIE still draining serosanguinous material. Pt c/o pelvic brim area discomfort and urinary frequency.    ANTIBIOTICS/RELEVANT:    vancomycin  IVPB 1500milliGRAM(s) IV Intermittent every 12 hours  ciprofloxacin   IVPB  IV Intermittent   metroNIDAZOLE  IVPB  IV Intermittent   ciprofloxacin   IVPB 400milliGRAM(s) IV Intermittent every 12 hours  metroNIDAZOLE  IVPB 500milliGRAM(s) IV Intermittent every 8 hours  clotrimazole 1% Cream 1Application(s) Topical two times a day      Allergies    amoxicillin (Unknown)  iodine containing compounds (Hives; Anaphylaxis)  penicillin (Hives)  shellfish. (Hives; Anaphylaxis)    Intolerances        PHYSICAL EXAMINATION:    Vital Signs Last 24 Hrs  T(C): 36.6, Max: 37.4 (02-08 @ 21:40)  T(F): 97.9, Max: 99.3 (02-08 @ 21:40)  HR: 78 (76 - 91)  BP: 137/84 (134/78 - 145/76)  BP(mean): --  RR: 16 (16 - 18)  SpO2: 95% (95% - 100%)    Alert, pleasant  Chest clear  cor regular  abdomen, obese, soft, normal BS, 3+deep tenderness both lower quadrants, suprapubic areas    LABS:                        11.7   9.1   )-----------( 463      ( 09 Feb 2017 06:48 )             36.8     09 Feb 2017 06:44    142    |  103    |  5      ----------------------------<  135    3.6     |  28     |  0.70     Ca    8.2        09 Feb 2017 06:44  Phos  3.7       09 Feb 2017 06:44  Mg     2.1       09 Feb 2017 06:44    MICROBIOLOGY: drainage culture [facility] INTERVAL HPI/OVERNIGHT EVENTS:    intra-abdominal collection drained of 700 ml of purulent fluid on 2/7.  ABBIE still draining serosanguinous material. Pt c/o pelvic brim area discomfort and urinary frequency.    ANTIBIOTICS/RELEVANT:    vancomycin  IVPB 1500milliGRAM(s) IV Intermittent every 12 hours  ciprofloxacin   IVPB  IV Intermittent   metroNIDAZOLE  IVPB  IV Intermittent   ciprofloxacin   IVPB 400milliGRAM(s) IV Intermittent every 12 hours  metroNIDAZOLE  IVPB 500milliGRAM(s) IV Intermittent every 8 hours  clotrimazole 1% Cream 1Application(s) Topical two times a day      Allergies    amoxicillin (Unknown)  iodine containing compounds (Hives; Anaphylaxis)  penicillin (Hives)  shellfish. (Hives; Anaphylaxis)    Intolerances        PHYSICAL EXAMINATION:    Vital Signs Last 24 Hrs  T(C): 36.6, Max: 37.4 (02-08 @ 21:40)  T(F): 97.9, Max: 99.3 (02-08 @ 21:40)  HR: 78 (76 - 91)  BP: 137/84 (134/78 - 145/76)  BP(mean): --  RR: 16 (16 - 18)  SpO2: 95% (95% - 100%)    Alert, pleasant  Chest clear  cor regular  abdomen, obese, soft, normal BS, 3+deep tenderness both lower quadrants, suprapubic areas    LABS:                        11.7   9.1   )-----------( 463      ( 09 Feb 2017 06:48 )             36.8     09 Feb 2017 06:44    142    |  103    |  5      ----------------------------<  135    3.6     |  28     |  0.70       MICROBIOLOGY: drainage culture [facility]    Impression:  while we await final culture results. it is not likely that this infection will be resolved with medical management and closed drainage alone.    Plan:  1. await cultures            2.continue current Rx            3. re-image abdomen next week to assess effectiveness of drainage both superior and deep to the mesh.            4. please re-check U/a, C&S in view of bladder discomfort.    Will follow.

## 2017-02-09 NOTE — PROGRESS NOTE ADULT - ASSESSMENT
36F s/p ventral hernia repair with infected seroma versus abscess, now s/p IR drainage    -Reg diet  -Vanc/cipro/flagyl  -would appreciate ID recs on home abx plan  -f/u Cx  -Home meds and ISS  -DVT ppx  -Pain/nausea control prn  -OOB ambulation

## 2017-02-09 NOTE — PROGRESS NOTE ADULT - SUBJECTIVE AND OBJECTIVE BOX
O/N: BlCx NGx1d, Abscess gram stain with mod wbc, no org.  No ID note, draining more serous, less erythema/induraiton, less pain.  2/8: staples removed    Post IR drainage day 2 O/N: BlCx NGx1d, Abscess gram stain with mod wbc, no org.  No ID note, draining more serous, less erythema/induraiton, less pain.  : staples removed    Post IR drainage day 2    SUBJECTIVE:  Flatus: [x ] YES [ ] NO             Bowel Movement: [x ] YES [ ] NO  Pain (0-10):            Pain Control Adequate: [x ] YES [ ] NO  Nausea: [ ] YES [ x] NO            Vomiting: [ ] YES [x ] NO  Diarrhea: [ ] YES [ x] NO         Constipation: [ ] YES [x ] NO     Chest Pain: [ ] YES [x ] NO    SOB:  [ ] YES [ x] NO    MEDICATIONS  (STANDING):  heparin  Injectable 5000Unit(s) SubCutaneous every 8 hours  insulin lispro (HumaLOG) corrective regimen sliding scale  SubCutaneous every 4 hours  dextrose 5%. 1000milliLiter(s) IV Continuous <Continuous>  dextrose 50% Injectable 12.5Gram(s) IV Push once  metoprolol 100milliGRAM(s) Oral daily  vancomycin  IVPB 1500milliGRAM(s) IV Intermittent every 12 hours  ciprofloxacin   IVPB  IV Intermittent   metroNIDAZOLE  IVPB  IV Intermittent   ciprofloxacin   IVPB 400milliGRAM(s) IV Intermittent every 12 hours  metroNIDAZOLE  IVPB 500milliGRAM(s) IV Intermittent every 8 hours  clotrimazole 1% Cream 1Application(s) Topical two times a day    MEDICATIONS  (PRN):  dextrose Gel 1Dose(s) Oral once PRN Blood Glucose LESS THAN 70 milliGRAM(s)/deciliter  glucagon  Injectable 1milliGRAM(s) IntraMuscular once PRN Glucose LESS THAN 70 milligrams/deciliter  ALBUTerol/ipratropium for Nebulization 3milliLiter(s) Nebulizer every 6 hours PRN Shortness of Breath and/or Wheezing  HYDROmorphone  Injectable 0.5milliGRAM(s) IV Push every 4 hours PRN Moderate Pain (4 - 6)  HYDROmorphone  Injectable 1milliGRAM(s) IV Push every 4 hours PRN Severe Pain (7 - 10)  ondansetron Injectable 4milliGRAM(s) IV Push every 6 hours PRN Nausea and/or Vomiting  metoclopramide Injectable 10milliGRAM(s) IV Push every 6 hours PRN nausea      Vital Signs Last 24 Hrs  T(C): 36.3, Max: 37.4 ( @ 21:40)  T(F): 97.4, Max: 99.3 ( @ 21:40)  HR: 90 (74 - 91)  BP: 145/76 (134/78 - 146/106)  BP(mean): --  RR: 16 (16 - 18)  SpO2: 95% (95% - 100%)    PHYSICAL EXAM:      Constitutional: A&Ox3    Respiratory: non labored breathing, no respiratory distress    Cardiovascular: NSR, RRR    Gastrointestinal: soft, non-distended,                  Incision: ABBIE drain- serous, dec purulent drainage    Genitourinary: voiding     Extremities: (-) edema        I&O's Detail    I & Os for current day (as of 2017 08:15)  =============================================  IN:    IV PiggyBack: 850 ml    Oral Fluid: 250 ml    Total IN: 1100 ml  ---------------------------------------------  OUT:    Voided: 1900 ml    Bulb: 100 ml    Total OUT: 2000 ml  ---------------------------------------------  Total NET: -900 ml      LABS:                        11.7   9.1   )-----------( 463      ( 2017 06:48 )             36.8     2017 06:44    142    |  103    |  5      ----------------------------<  135    3.6     |  28     |  0.70     Ca    8.2        2017 06:44  Phos  3.7       2017 06:44  Mg     2.1       2017 06:44        Urinalysis Basic - ( 2017 10:43 )    Color: Yellow / Appearance: Clear / S.020 / pH: x  Gluc: x / Ketone: NEGATIVE  / Bili: NEGATIVE / Urobili: 0.2 E.U./dL   Blood: x / Protein: NEGATIVE mg/dL / Nitrite: NEGATIVE   Leuk Esterase: Trace / RBC: < 5 /HPF / WBC Many /HPF   Sq Epi: x / Non Sq Epi: Moderate /HPF / Bacteria: Present /HPF        RADIOLOGY & ADDITIONAL STUDIES:

## 2017-02-10 RX ORDER — INSULIN LISPRO 100/ML
VIAL (ML) SUBCUTANEOUS
Qty: 0 | Refills: 0 | Status: DISCONTINUED | OUTPATIENT
Start: 2017-02-10 | End: 2017-02-15

## 2017-02-10 RX ORDER — DEXTROSE 50 % IN WATER 50 %
12.5 SYRINGE (ML) INTRAVENOUS ONCE
Qty: 0 | Refills: 0 | Status: DISCONTINUED | OUTPATIENT
Start: 2017-02-10 | End: 2017-02-15

## 2017-02-10 RX ORDER — SODIUM CHLORIDE 9 MG/ML
1000 INJECTION, SOLUTION INTRAVENOUS
Qty: 0 | Refills: 0 | Status: DISCONTINUED | OUTPATIENT
Start: 2017-02-10 | End: 2017-02-15

## 2017-02-10 RX ORDER — DEXTROSE 50 % IN WATER 50 %
1 SYRINGE (ML) INTRAVENOUS ONCE
Qty: 0 | Refills: 0 | Status: DISCONTINUED | OUTPATIENT
Start: 2017-02-10 | End: 2017-02-15

## 2017-02-10 RX ORDER — GLUCAGON INJECTION, SOLUTION 0.5 MG/.1ML
1 INJECTION, SOLUTION SUBCUTANEOUS ONCE
Qty: 0 | Refills: 0 | Status: DISCONTINUED | OUTPATIENT
Start: 2017-02-10 | End: 2017-02-15

## 2017-02-10 RX ADMIN — Medication 2: at 14:37

## 2017-02-10 RX ADMIN — Medication 1 APPLICATION(S): at 05:59

## 2017-02-10 RX ADMIN — Medication 2: at 06:32

## 2017-02-10 RX ADMIN — HEPARIN SODIUM 5000 UNIT(S): 5000 INJECTION INTRAVENOUS; SUBCUTANEOUS at 05:59

## 2017-02-10 RX ADMIN — HEPARIN SODIUM 5000 UNIT(S): 5000 INJECTION INTRAVENOUS; SUBCUTANEOUS at 21:34

## 2017-02-10 RX ADMIN — Medication 100 MILLIGRAM(S): at 05:58

## 2017-02-10 RX ADMIN — Medication 200 MILLIGRAM(S): at 18:04

## 2017-02-10 RX ADMIN — Medication 100 MILLIGRAM(S): at 05:59

## 2017-02-10 RX ADMIN — Medication 166.67 MILLIGRAM(S): at 00:05

## 2017-02-10 RX ADMIN — Medication 166.67 MILLIGRAM(S): at 12:59

## 2017-02-10 RX ADMIN — HEPARIN SODIUM 5000 UNIT(S): 5000 INJECTION INTRAVENOUS; SUBCUTANEOUS at 14:37

## 2017-02-10 RX ADMIN — Medication 200 MILLIGRAM(S): at 05:58

## 2017-02-10 RX ADMIN — Medication 1 APPLICATION(S): at 18:04

## 2017-02-10 RX ADMIN — Medication: at 18:18

## 2017-02-10 RX ADMIN — ONDANSETRON 4 MILLIGRAM(S): 8 TABLET, FILM COATED ORAL at 12:59

## 2017-02-10 RX ADMIN — Medication 100 MILLIGRAM(S): at 21:34

## 2017-02-10 RX ADMIN — Medication 2: at 22:19

## 2017-02-10 RX ADMIN — Medication 100 MILLIGRAM(S): at 14:37

## 2017-02-10 NOTE — PROGRESS NOTE ADULT - ASSESSMENT
36F s/p ventral hernia repair with infected seroma versus abscess, now s/p IR drainage    -Reg diet  -Vanc/cipro/flagyl  -would appreciate ID recs on home abx plan  -f/u Cx  -Home meds and ISS  -DVT ppx  -Pain/nausea control prn  -OOB ambulation 36F s/p ventral hernia repair with infected seroma versus abscess, now s/p IR drainage    -Reg diet  -Vanc/cipro/flagyl  -would appreciate ID recs on home abx plan, oral vs IV  -f/u Cx  -Home meds and ISS  -DVT ppx  -Pain/nausea control prn  -OOB ambulation

## 2017-02-10 NOTE — DIETITIAN INITIAL EVALUATION ADULT. - NS AS NUTRI INTERV ED CONTENT
Provided weight management tips, and educated on small frequent meals, mixed meals, portion sizes for DM management./Purpose of the nutrition education

## 2017-02-10 NOTE — PROGRESS NOTE ADULT - SUBJECTIVE AND OBJECTIVE BOX
O/N: UA neg. Vanc trough 20.8, dec vanc to 1250.  2/9: as per ID, continue current treatment until cultures return; would rec repeat CT to eval abscess; repeat UA for discomfort while urinating O/N: UA neg. Vanc trough 20.8, dec vanc to 1250.  2/: as per ID, continue current treatment until cultures return; would rec repeat CT to eval abscess; repeat UA for discomfort while urinating         SUBJECTIVE:  Flatus: [ ] YES [ ] NO             Bowel Movement: [ ] YES [ ] NO  Pain (0-10):            Pain Control Adequate: [x ] YES [ ] NO  Nausea: [ ] YES [ ] NO            Vomiting: [ ] YES [ ] NO  Diarrhea: [ ] YES [ ] NO         Constipation: [ ] YES [ ] NO     Chest Pain: [ ] YES [ ] NO    SOB:  [ ] YES [ ] NO    MEDICATIONS  (STANDING):  heparin  Injectable 5000Unit(s) SubCutaneous every 8 hours  insulin lispro (HumaLOG) corrective regimen sliding scale  SubCutaneous every 4 hours  dextrose 5%. 1000milliLiter(s) IV Continuous <Continuous>  dextrose 50% Injectable 12.5Gram(s) IV Push once  metoprolol 100milliGRAM(s) Oral daily  ciprofloxacin   IVPB  IV Intermittent   metroNIDAZOLE  IVPB  IV Intermittent   ciprofloxacin   IVPB 400milliGRAM(s) IV Intermittent every 12 hours  metroNIDAZOLE  IVPB 500milliGRAM(s) IV Intermittent every 8 hours  clotrimazole 1% Cream 1Application(s) Topical two times a day  vancomycin  IVPB 1250milliGRAM(s) IV Intermittent every 12 hours    MEDICATIONS  (PRN):  dextrose Gel 1Dose(s) Oral once PRN Blood Glucose LESS THAN 70 milliGRAM(s)/deciliter  glucagon  Injectable 1milliGRAM(s) IntraMuscular once PRN Glucose LESS THAN 70 milligrams/deciliter  ALBUTerol/ipratropium for Nebulization 3milliLiter(s) Nebulizer every 6 hours PRN Shortness of Breath and/or Wheezing  ondansetron Injectable 4milliGRAM(s) IV Push every 6 hours PRN Nausea and/or Vomiting  metoclopramide Injectable 10milliGRAM(s) IV Push every 6 hours PRN nausea  oxyCODONE  5 mG/acetaminophen 325 mG 1Tablet(s) Oral every 4 hours PRN Moderate Pain (4 - 6)  oxyCODONE  5 mG/acetaminophen 325 mG 2Tablet(s) Oral every 4 hours PRN Severe Pain (7 - 10)      Vital Signs Last 24 Hrs  T(C): 36.1, Max: 37.2 (02-09 @ 13:21)  T(F): 97, Max: 98.9 ( @ 13:21)  HR: 83 (72 - 85)  BP: 141/76 (130/90 - 141/80)  BP(mean): --  RR: 18 (16 - 20)  SpO2: 99% (95% - 100%)    Physical Exam:  General: NAD, resting comfortably in bed  Pulmonary: Nonlabored breathing, no respiratory distress  Cardiovascular: NSR  Abdominal: soft, ND, obese, TTP over groin, erythema from intertrigo (fungal cream present)  Extremities: WWP, normal strength  Neuro: A/O x 3, CNs II-XII grossly intact, no focal deficits, normal motor/sensation  Pulses: palpable distal pulses    I&O's Summary    I & Os for current day (as of 10 Feb 2017 08:24)  =============================================  IN: 1190 ml / OUT: 2155 ml / NET: -965 ml      LABS:                        11.7   9.1   )-----------( 463      ( 2017 06:48 )             36.8     2017 06:44    142    |  103    |  5      ----------------------------<  135    3.6     |  28     |  0.70     Ca    8.2        2017 06:44  Phos  3.7       2017 06:44  Mg     2.1       2017 06:44        Urinalysis Basic - ( 2017 18:33 )    Color: Yellow / Appearance: Clear / S.025 / pH: x  Gluc: x / Ketone: NEGATIVE  / Bili: NEGATIVE / Urobili: 0.2 E.U./dL   Blood: x / Protein: NEGATIVE mg/dL / Nitrite: NEGATIVE   Leuk Esterase: NEGATIVE / RBC: x / WBC x   Sq Epi: x / Non Sq Epi: x / Bacteria: x      CAPILLARY BLOOD GLUCOSE  156 (10 Feb 2017 06:00)  134 (10 Feb 2017 02:07)  186 (2017 21:55)  108 (2017 18:03)  171 (2017 14:27)  178 (2017 10:06)        RADIOLOGY & ADDITIONAL STUDIES:

## 2017-02-10 NOTE — PROGRESS NOTE ADULT - SUBJECTIVE AND OBJECTIVE BOX
S:  Still w/ sweats ON.  Pain near drain.  Reports hardening of abdomen to the L of drain.        MEDICATIONS  (STANDING):  heparin  Injectable 5000Unit(s) SubCutaneous every 8 hours  insulin lispro (HumaLOG) corrective regimen sliding scale  SubCutaneous every 4 hours  dextrose 5%. 1000milliLiter(s) IV Continuous <Continuous>  dextrose 50% Injectable 12.5Gram(s) IV Push once  metoprolol 100milliGRAM(s) Oral daily  ciprofloxacin   IVPB  IV Intermittent   metroNIDAZOLE  IVPB  IV Intermittent   ciprofloxacin   IVPB 400milliGRAM(s) IV Intermittent every 12 hours  metroNIDAZOLE  IVPB 500milliGRAM(s) IV Intermittent every 8 hours  clotrimazole 1% Cream 1Application(s) Topical two times a day  vancomycin  IVPB 1250milliGRAM(s) IV Intermittent every 12 hours    MEDICATIONS  (PRN):  dextrose Gel 1Dose(s) Oral once PRN Blood Glucose LESS THAN 70 milliGRAM(s)/deciliter  glucagon  Injectable 1milliGRAM(s) IntraMuscular once PRN Glucose LESS THAN 70 milligrams/deciliter  ALBUTerol/ipratropium for Nebulization 3milliLiter(s) Nebulizer every 6 hours PRN Shortness of Breath and/or Wheezing  ondansetron Injectable 4milliGRAM(s) IV Push every 6 hours PRN Nausea and/or Vomiting  metoclopramide Injectable 10milliGRAM(s) IV Push every 6 hours PRN nausea  oxyCODONE  5 mG/acetaminophen 325 mG 1Tablet(s) Oral every 4 hours PRN Moderate Pain (4 - 6)  oxyCODONE  5 mG/acetaminophen 325 mG 2Tablet(s) Oral every 4 hours PRN Severe Pain (7 - 10)      Vital Signs Last 24 Hrs  T(C): 36.3, Max: 37.2 (02-09 @ 13:21)  T(F): 97.4, Max: 98.9 (02-09 @ 13:21)  HR: 72 (72 - 85)  BP: 136/93 (130/90 - 141/80)  BP(mean): --  RR: 18 (16 - 20)  SpO2: 99% (95% - 100%)    PHYSICAL EXAM:  Constitutional: Well-developed, well-nourished, in NAD  Eyes: normal conjunctivae  ENMT: NCAT, MMM  Neck: supple, no JVD  Respiratory: CTAB  Cardiovascular: RRR, no M/G/R  Gastrointestinal: soft, NTND, no organomegaly  Genitourinary:  Rectal:  Extremities: WWP, no C/C/E  Vascular: 2+ radial and DP pulses B/L  Neurological: grossly intact  Skin: no rash  Lymph Nodes: No cervical LAD  Musculoskeletal: ROM intact  Psychiatric: normal affect    LABS:                        11.7   9.1   )-----------( 463      ( 2017 06:48 )             36.8     2017 06:44    142    |  103    |  5      ----------------------------<  135    3.6     |  28     |  0.70     Ca    8.2        2017 06:44  Phos  3.7       2017 06:44  Mg     2.1       2017 06:44      Urinalysis Basic - ( 2017 18:33 )    Color: Yellow / Appearance: Clear / S.025 / pH: x  Gluc: x / Ketone: NEGATIVE  / Bili: NEGATIVE / Urobili: 0.2 E.U./dL   Blood: x / Protein: NEGATIVE mg/dL / Nitrite: NEGATIVE   Leuk Esterase: NEGATIVE / RBC: x / WBC x   Sq Epi: x / Non Sq Epi: x / Bacteria: x      RADIOLOGY & ADDITIONAL STUDIES:  Culture - Body Fluid with Gram Stain (17 @ 06:33)    Gram Stain:   Moderate White blood cells  No organisms seen    Specimen Source: .Body Fluid Abdominal Fluid    Culture Results:   Culture being held to rule out anaerobes.    35 yo F PMH smoking, DM2, p/w Superficial SSI s/p repair of ventral hernia - now s/p drainage of large abscess communicating with mesh. S:  Still w/ sweats ON.  Pain near drain.  Reports hardening of abdomen to the L of drain.        MEDICATIONS  (STANDING):  heparin  Injectable 5000Unit(s) SubCutaneous every 8 hours  insulin lispro (HumaLOG) corrective regimen sliding scale  SubCutaneous every 4 hours  dextrose 5%. 1000milliLiter(s) IV Continuous <Continuous>  dextrose 50% Injectable 12.5Gram(s) IV Push once  metoprolol 100milliGRAM(s) Oral daily  ciprofloxacin   IVPB  IV Intermittent   metroNIDAZOLE  IVPB  IV Intermittent   ciprofloxacin   IVPB 400milliGRAM(s) IV Intermittent every 12 hours  metroNIDAZOLE  IVPB 500milliGRAM(s) IV Intermittent every 8 hours  clotrimazole 1% Cream 1Application(s) Topical two times a day  vancomycin  IVPB 1250milliGRAM(s) IV Intermittent every 12 hours    MEDICATIONS  (PRN):  dextrose Gel 1Dose(s) Oral once PRN Blood Glucose LESS THAN 70 milliGRAM(s)/deciliter  glucagon  Injectable 1milliGRAM(s) IntraMuscular once PRN Glucose LESS THAN 70 milligrams/deciliter  ALBUTerol/ipratropium for Nebulization 3milliLiter(s) Nebulizer every 6 hours PRN Shortness of Breath and/or Wheezing  ondansetron Injectable 4milliGRAM(s) IV Push every 6 hours PRN Nausea and/or Vomiting  metoclopramide Injectable 10milliGRAM(s) IV Push every 6 hours PRN nausea  oxyCODONE  5 mG/acetaminophen 325 mG 1Tablet(s) Oral every 4 hours PRN Moderate Pain (4 - 6)  oxyCODONE  5 mG/acetaminophen 325 mG 2Tablet(s) Oral every 4 hours PRN Severe Pain (7 - 10)      Vital Signs Last 24 Hrs  T(C): 36.3, Max: 37.2 (02-09 @ 13:21)  T(F): 97.4, Max: 98.9 (02-09 @ 13:21)  HR: 72 (72 - 85)  BP: 136/93 (130/90 - 141/80)  BP(mean): --  RR: 18 (16 - 20)  SpO2: 99% (95% - 100%)    PHYSICAL EXAM:  Constitutional: obese, NAD  Eyes: normal conjunctivae  ENMT: NCAT, MMM  Neck: supple, no JVD  Respiratory: CTAB  Cardiovascular: RRR, no M/G/R  Gastrointestinal: soft, NTND, surgical incision clean, ABBIE drain w/ serosanguinous less purulent fluid draining  Extremities: WWP, no C/C/E  Vascular: 2+ radial and DP pulses B/L  Neurological: grossly intact  Skin: no rash  Lymph Nodes: No cervical LAD  Musculoskeletal: ROM intact  Psychiatric: normal affect    LABS:                        11.7   9.1   )-----------( 463      ( 2017 06:48 )             36.8     2017 06:44    142    |  103    |  5      ----------------------------<  135    3.6     |  28     |  0.70     Ca    8.2        2017 06:44  Phos  3.7       2017 06:44  Mg     2.1       2017 06:44      Urinalysis Basic - ( 2017 18:33 )    Color: Yellow / Appearance: Clear / S.025 / pH: x  Gluc: x / Ketone: NEGATIVE  / Bili: NEGATIVE / Urobili: 0.2 E.U./dL   Blood: x / Protein: NEGATIVE mg/dL / Nitrite: NEGATIVE   Leuk Esterase: NEGATIVE / RBC: x / WBC x   Sq Epi: x / Non Sq Epi: x / Bacteria: x      RADIOLOGY & ADDITIONAL STUDIES:  Culture - Body Fluid with Gram Stain (17 @ 06:33)    Gram Stain:   Moderate White blood cells  No organisms seen    Specimen Source: .Body Fluid Abdominal Fluid    Culture Results:   Culture being held to rule out anaerobes.    35 yo F PMH smoking, DM2, p/w Superficial SSI s/p repair of ventral hernia - now s/p drainage of large abscess communicating with mesh.    1)  abscess - c/w current IV atx; rescan a/p next week to monitor communicating fluid collections

## 2017-02-10 NOTE — DIETITIAN INITIAL EVALUATION ADULT. - OTHER INFO
Pt admitted with abscess s/p hernia repair. Pt on consistent carb diet, yet with poor to fair PO intake at this time 2/2 nausea. On zofran. Pt denies any significant pain at this time. Shellfish allergy noted.

## 2017-02-10 NOTE — PROGRESS NOTE ADULT - ATTENDING COMMENTS
ID Attending    I would like to continue intravenous antibiotics until next week, and repeat CT scan to assess adequacy of the most recent drainage procedure.  This scan should be done no sooner that 7 days after the drainage procedure.

## 2017-02-10 NOTE — DIETITIAN INITIAL EVALUATION ADULT. - ENERGY NEEDS
Height: 67" Weight: 240lbs, IBW 135lbs+/-10%, %%, BMI - 37  IBW used to calculate energy needs due to pt's current body weight exceeding 120% of IBW   Nutrient needs based on [facility] standards of care for maintenance in adults.

## 2017-02-10 NOTE — PROGRESS NOTE ADULT - SUBJECTIVE AND OBJECTIVE BOX
status post abdominal abscess drainage.     Vital Signs Last 24 Hrs  T(C): 37, Max: 37 (02-09 @ 17:25)  T(F): 98.6, Max: 98.6 (02-09 @ 17:25)  HR: 91 (72 - 91)  BP: 112/80 (112/80 - 141/80)  BP(mean): --  RR: 17 (17 - 20)  SpO2: 100% (97% - 100%)                            11.7   9.1   )-----------( 463      ( 09 Feb 2017 06:48 )             36.8                I&O's Detail  I & Os for 24h ending 10 Feb 2017 07:00  =============================================  IN:    IV PiggyBack: 950 ml    Oral Fluid: 240 ml    Total IN: 1190 ml  ---------------------------------------------  OUT:    Voided: 2055 ml    Bulb: 100 ml    Total OUT: 2155 ml  ---------------------------------------------  Total NET: -965 ml    I & Os for current day (as of 10 Feb 2017 17:02)  =============================================  IN:    Oral Fluid: 120 ml    Total IN: 120 ml  ---------------------------------------------  OUT:    Bulb: 40 ml    Total OUT: 40 ml  ---------------------------------------------  Total NET: 80 ml

## 2017-02-11 LAB — VANCOMYCIN TROUGH SERPL-MCNC: 10.9 UG/ML — SIGNIFICANT CHANGE UP (ref 10–20)

## 2017-02-11 RX ORDER — ACETAMINOPHEN 500 MG
500 TABLET ORAL ONCE
Qty: 0 | Refills: 0 | Status: COMPLETED | OUTPATIENT
Start: 2017-02-11 | End: 2017-02-11

## 2017-02-11 RX ADMIN — Medication 200 MILLIGRAM(S): at 06:22

## 2017-02-11 RX ADMIN — Medication 200 MILLIGRAM(S): at 18:00

## 2017-02-11 RX ADMIN — Medication 166.67 MILLIGRAM(S): at 12:21

## 2017-02-11 RX ADMIN — Medication 2: at 12:21

## 2017-02-11 RX ADMIN — HEPARIN SODIUM 5000 UNIT(S): 5000 INJECTION INTRAVENOUS; SUBCUTANEOUS at 13:25

## 2017-02-11 RX ADMIN — Medication 1 APPLICATION(S): at 18:01

## 2017-02-11 RX ADMIN — Medication 166.67 MILLIGRAM(S): at 01:13

## 2017-02-11 RX ADMIN — Medication 100 MILLIGRAM(S): at 06:23

## 2017-02-11 RX ADMIN — Medication 100 MILLIGRAM(S): at 13:25

## 2017-02-11 RX ADMIN — Medication 2: at 23:00

## 2017-02-11 RX ADMIN — Medication 100 MILLIGRAM(S): at 10:19

## 2017-02-11 RX ADMIN — Medication 100 MILLIGRAM(S): at 23:00

## 2017-02-11 RX ADMIN — HEPARIN SODIUM 5000 UNIT(S): 5000 INJECTION INTRAVENOUS; SUBCUTANEOUS at 21:57

## 2017-02-11 RX ADMIN — Medication 1 APPLICATION(S): at 06:23

## 2017-02-11 RX ADMIN — Medication 2: at 18:00

## 2017-02-11 RX ADMIN — Medication 500 MILLIGRAM(S): at 06:23

## 2017-02-11 RX ADMIN — HEPARIN SODIUM 5000 UNIT(S): 5000 INJECTION INTRAVENOUS; SUBCUTANEOUS at 06:23

## 2017-02-11 RX ADMIN — Medication 500 MILLIGRAM(S): at 07:30

## 2017-02-11 NOTE — PROGRESS NOTE ADULT - SUBJECTIVE AND OBJECTIVE BOX
O/N: AFVSS, FAUSTINO  2/10: as per ID, rec keeping on IV abx until repeat CT A/P is done 1 week from TIM hamm ( 2/14) O/N: AFVSS, FAUSTINO  2/10: as per ID, rec keeping on IV abx until repeat CT A/P is done 1 week from TIM hamm ( )    SUBJECTIVE:  Flatus: [ x] YES [ ] NO             Bowel Movement: [ ] YES [ x] NO  Pain Control Adequate: [x ] YES [ ] NO  Nausea: [ ] YES [ x] NO            Vomiting: [ ] YES [ x] NO  Diarrhea: [ ] YES [x ] NO         Constipation: [ ] YES [ x] NO     Chest Pain: [ ] YES [ x] NO    SOB:  [ ] YES [ x] NO    MEDICATIONS  (STANDING):  heparin  Injectable 5000Unit(s) SubCutaneous every 8 hours  metoprolol 100milliGRAM(s) Oral daily  ciprofloxacin   IVPB  IV Intermittent   metroNIDAZOLE  IVPB  IV Intermittent   ciprofloxacin   IVPB 400milliGRAM(s) IV Intermittent every 12 hours  metroNIDAZOLE  IVPB 500milliGRAM(s) IV Intermittent every 8 hours  clotrimazole 1% Cream 1Application(s) Topical two times a day  vancomycin  IVPB 1250milliGRAM(s) IV Intermittent every 12 hours  insulin lispro (HumaLOG) corrective regimen sliding scale  SubCutaneous Before meals and at bedtime  dextrose 5%. 1000milliLiter(s) IV Continuous <Continuous>  dextrose 50% Injectable 12.5Gram(s) IV Push once    MEDICATIONS  (PRN):  ALBUTerol/ipratropium for Nebulization 3milliLiter(s) Nebulizer every 6 hours PRN Shortness of Breath and/or Wheezing  ondansetron Injectable 4milliGRAM(s) IV Push every 6 hours PRN Nausea and/or Vomiting  metoclopramide Injectable 10milliGRAM(s) IV Push every 6 hours PRN nausea  oxyCODONE  5 mG/acetaminophen 325 mG 1Tablet(s) Oral every 4 hours PRN Moderate Pain (4 - 6)  oxyCODONE  5 mG/acetaminophen 325 mG 2Tablet(s) Oral every 4 hours PRN Severe Pain (7 - 10)  dextrose Gel 1Dose(s) Oral once PRN Blood Glucose LESS THAN 70 milliGRAM(s)/deciliter  glucagon  Injectable 1milliGRAM(s) IntraMuscular once PRN Glucose LESS THAN 70 milligrams/deciliter      Vital Signs Last 24 Hrs  T(C): 37.1, Max: 37.1 ( @ 09:39)  T(F): 98.8, Max: 98.8 ( @ 09:39)  HR: 75 (72 - 91)  BP: 162/90 (109/69 - 162/90)  BP(mean): --  RR: 16 (16 - 18)  SpO2: 98% (95% - 100%)    PHYSICAL EXAM:      Constitutional: NAD, A&Ox3    Gastrointestinal: Soft, NT/ND, No guarding or rebound    Extremities: no peripheral edema    Incision: CDI        I&O's Detail    I & Os for current day (as of 2017 09:54)  =============================================  IN:    Oral Fluid: 660 ml    IV PiggyBack: 350 ml    Total IN: 1010 ml  ---------------------------------------------  OUT:    Voided: 1300 ml    Bulb: 120 ml    Total OUT: 1420 ml  ---------------------------------------------  Total NET: -410 ml      LABS:            Urinalysis Basic - ( 2017 18:33 )    Color: Yellow / Appearance: Clear / S.025 / pH: x  Gluc: x / Ketone: NEGATIVE  / Bili: NEGATIVE / Urobili: 0.2 E.U./dL   Blood: x / Protein: NEGATIVE mg/dL / Nitrite: NEGATIVE   Leuk Esterase: NEGATIVE / RBC: x / WBC x   Sq Epi: x / Non Sq Epi: x / Bacteria: x          RADIOLOGY & ADDITIONAL STUDIES:

## 2017-02-11 NOTE — PROGRESS NOTE ADULT - ASSESSMENT
36F s/p ventral hernia repair with infected seroma versus abscess, now s/p IR drainage    -Reg diet  -Vanc/cipro/flagyl  -f/u ID recs   -f/u Cx  -Home meds and ISS  -DVT ppx  -Pain/nausea control prn  -OOB ambulation

## 2017-02-12 LAB
-  CLINDAMYCIN: SIGNIFICANT CHANGE UP
-  GENTAMICIN: SIGNIFICANT CHANGE UP
-  PENICILLIN: SIGNIFICANT CHANGE UP
-  VANCOMYCIN: SIGNIFICANT CHANGE UP
ANION GAP SERPL CALC-SCNC: 8 MMOL/L — LOW (ref 9–16)
BUN SERPL-MCNC: 6 MG/DL — LOW (ref 7–23)
CALCIUM SERPL-MCNC: 8.6 MG/DL — SIGNIFICANT CHANGE UP (ref 8.5–10.5)
CHLORIDE SERPL-SCNC: 104 MMOL/L — SIGNIFICANT CHANGE UP (ref 96–108)
CO2 SERPL-SCNC: 26 MMOL/L — SIGNIFICANT CHANGE UP (ref 22–31)
CREAT SERPL-MCNC: 0.75 MG/DL — SIGNIFICANT CHANGE UP (ref 0.5–1.3)
CULTURE RESULTS: SIGNIFICANT CHANGE UP
GLUCOSE SERPL-MCNC: 141 MG/DL — HIGH (ref 70–99)
HCT VFR BLD CALC: 34 % — LOW (ref 34.5–45)
HGB BLD-MCNC: 10.7 G/DL — LOW (ref 11.5–15.5)
MAGNESIUM SERPL-MCNC: 2.2 MG/DL — SIGNIFICANT CHANGE UP (ref 1.6–2.4)
MCHC RBC-ENTMCNC: 24.7 PG — LOW (ref 27–34)
MCHC RBC-ENTMCNC: 31.5 G/DL — LOW (ref 32–36)
MCV RBC AUTO: 78.5 FL — LOW (ref 80–100)
METHOD TYPE: SIGNIFICANT CHANGE UP
ORGANISM # SPEC MICROSCOPIC CNT: SIGNIFICANT CHANGE UP
ORGANISM # SPEC MICROSCOPIC CNT: SIGNIFICANT CHANGE UP
PHOSPHATE SERPL-MCNC: 3.1 MG/DL — SIGNIFICANT CHANGE UP (ref 2.5–4.5)
PLATELET # BLD AUTO: 428 K/UL — HIGH (ref 150–400)
POTASSIUM SERPL-MCNC: 3.4 MMOL/L — LOW (ref 3.5–5.3)
POTASSIUM SERPL-SCNC: 3.4 MMOL/L — LOW (ref 3.5–5.3)
RBC # BLD: 4.33 M/UL — SIGNIFICANT CHANGE UP (ref 3.8–5.2)
RBC # FLD: 15.5 % — SIGNIFICANT CHANGE UP (ref 10.3–16.9)
SODIUM SERPL-SCNC: 138 MMOL/L — SIGNIFICANT CHANGE UP (ref 135–145)
SPECIMEN SOURCE: SIGNIFICANT CHANGE UP
VANCOMYCIN TROUGH SERPL-MCNC: 10.1 UG/ML — SIGNIFICANT CHANGE UP (ref 10–20)
WBC # BLD: 11.2 K/UL — HIGH (ref 3.8–10.5)
WBC # FLD AUTO: 11.2 K/UL — HIGH (ref 3.8–10.5)

## 2017-02-12 RX ORDER — NYSTATIN CREAM 100000 [USP'U]/G
1 CREAM TOPICAL
Qty: 0 | Refills: 0 | Status: DISCONTINUED | OUTPATIENT
Start: 2017-02-12 | End: 2017-02-15

## 2017-02-12 RX ORDER — POTASSIUM CHLORIDE 20 MEQ
40 PACKET (EA) ORAL ONCE
Qty: 0 | Refills: 0 | Status: DISCONTINUED | OUTPATIENT
Start: 2017-02-12 | End: 2017-02-12

## 2017-02-12 RX ORDER — POTASSIUM CHLORIDE 20 MEQ
40 PACKET (EA) ORAL ONCE
Qty: 0 | Refills: 0 | Status: COMPLETED | OUTPATIENT
Start: 2017-02-12 | End: 2017-02-12

## 2017-02-12 RX ADMIN — Medication 166.67 MILLIGRAM(S): at 00:02

## 2017-02-12 RX ADMIN — Medication 100 MILLIGRAM(S): at 13:10

## 2017-02-12 RX ADMIN — Medication 166.67 MILLIGRAM(S): at 11:37

## 2017-02-12 RX ADMIN — NYSTATIN CREAM 1 APPLICATION(S): 100000 CREAM TOPICAL at 17:39

## 2017-02-12 RX ADMIN — Medication 40 MILLIEQUIVALENT(S): at 13:10

## 2017-02-12 RX ADMIN — Medication 100 MILLIGRAM(S): at 05:25

## 2017-02-12 RX ADMIN — HEPARIN SODIUM 5000 UNIT(S): 5000 INJECTION INTRAVENOUS; SUBCUTANEOUS at 13:10

## 2017-02-12 RX ADMIN — Medication 1 APPLICATION(S): at 07:11

## 2017-02-12 RX ADMIN — Medication 100 MILLIGRAM(S): at 07:12

## 2017-02-12 RX ADMIN — ONDANSETRON 4 MILLIGRAM(S): 8 TABLET, FILM COATED ORAL at 22:16

## 2017-02-12 RX ADMIN — Medication 200 MILLIGRAM(S): at 05:24

## 2017-02-12 RX ADMIN — HEPARIN SODIUM 5000 UNIT(S): 5000 INJECTION INTRAVENOUS; SUBCUTANEOUS at 21:40

## 2017-02-12 RX ADMIN — ONDANSETRON 4 MILLIGRAM(S): 8 TABLET, FILM COATED ORAL at 00:21

## 2017-02-12 RX ADMIN — Medication 100 MILLIGRAM(S): at 22:16

## 2017-02-12 RX ADMIN — NYSTATIN CREAM 1 APPLICATION(S): 100000 CREAM TOPICAL at 11:36

## 2017-02-12 RX ADMIN — HEPARIN SODIUM 5000 UNIT(S): 5000 INJECTION INTRAVENOUS; SUBCUTANEOUS at 05:24

## 2017-02-12 RX ADMIN — Medication 200 MILLIGRAM(S): at 17:39

## 2017-02-12 NOTE — PROGRESS NOTE ADULT - ASSESSMENT
36F s/p ventral hernia repair with infected seroma versus abscess, now s/p IR drainage    -Reg diet  -Vanc/cipro/flagyl  -f/u ID recs   -f/u Cx  -Home meds and ISS  -DVT ppx  -Pain/nausea control prn  -OOB ambulation 36F s/p ventral hernia repair with infected seroma versus abscess, now s/p IR drainage    -Reg diet, CC  -Vanc/cipro/flagyl  -f/u ID recs   -f/u Cx  -Home meds and ISS  -DVT ppx  -Pain/nausea control prn  -OOB ambulation  CT abd/pelvis in AM  Will discuss plan with attending

## 2017-02-12 NOTE — PROGRESS NOTE ADULT - SUBJECTIVE AND OBJECTIVE BOX
O/N: No acute events overnight   2/11: Vanc trough 10.9, retained vanc dose, FAUSTINO O/N: No acute events overnight   2/11: Vanc trough 10.9, retained vanc dose, FAUSTINO    STATUS POST:  ventral hernia repair, re-admitted with infected seroma s/p IR drainage     SUBJECTIVE: Patient seen and examined bedside by chief resident.    heparin  Injectable 5000Unit(s) SubCutaneous every 8 hours  metoprolol 100milliGRAM(s) Oral daily  ciprofloxacin   IVPB  IV Intermittent   metroNIDAZOLE  IVPB  IV Intermittent   ciprofloxacin   IVPB 400milliGRAM(s) IV Intermittent every 12 hours  metroNIDAZOLE  IVPB 500milliGRAM(s) IV Intermittent every 8 hours  vancomycin  IVPB 1250milliGRAM(s) IV Intermittent every 12 hours      Vital Signs Last 24 Hrs  T(C): 36.8, Max: 37.2 (02-11 @ 20:15)  T(F): 98.3, Max: 99 (02-11 @ 20:15)  HR: 113 (74 - 113)  BP: 125/87 (107/64 - 162/90)  BP(mean): --  RR: 16 (16 - 18)  SpO2: 100% (98% - 100%)  I&O's Detail    I & Os for current day (as of 12 Feb 2017 08:38)  =============================================  IN:    Oral Fluid: 420 ml    Solution: 250 ml    Solution: 200 ml    Solution: 200 ml    Total IN: 1070 ml  ---------------------------------------------  OUT:    Voided: 550 ml    Bulb: 70 ml    Total OUT: 620 ml  ---------------------------------------------  Total NET: 450 ml      General: NAD, resting comfortably in bed  Pulm: Nonlabored breathing, no respiratory distress  Abd: soft, obese. Incision CDI, though moist fungal rash evident in pannus. Tender to palpation in lower abdomen, below pannus. ABBIE with cloudy, serosanguinous output  Extrem: WWP, no edema        LABS:                        10.7   11.2  )-----------( 428      ( 12 Feb 2017 08:28 )             34.0                 RADIOLOGY & ADDITIONAL STUDIES:

## 2017-02-13 LAB
ANION GAP SERPL CALC-SCNC: 6 MMOL/L — LOW (ref 9–16)
BUN SERPL-MCNC: 8 MG/DL — SIGNIFICANT CHANGE UP (ref 7–23)
CALCIUM SERPL-MCNC: 8.2 MG/DL — LOW (ref 8.5–10.5)
CHLORIDE SERPL-SCNC: 105 MMOL/L — SIGNIFICANT CHANGE UP (ref 96–108)
CO2 SERPL-SCNC: 26 MMOL/L — SIGNIFICANT CHANGE UP (ref 22–31)
CREAT SERPL-MCNC: 0.67 MG/DL — SIGNIFICANT CHANGE UP (ref 0.5–1.3)
GLUCOSE SERPL-MCNC: 124 MG/DL — HIGH (ref 70–99)
HCT VFR BLD CALC: 34.5 % — SIGNIFICANT CHANGE UP (ref 34.5–45)
HGB BLD-MCNC: 10.7 G/DL — LOW (ref 11.5–15.5)
MAGNESIUM SERPL-MCNC: 2.1 MG/DL — SIGNIFICANT CHANGE UP (ref 1.6–2.4)
MCHC RBC-ENTMCNC: 24.3 PG — LOW (ref 27–34)
MCHC RBC-ENTMCNC: 31 G/DL — LOW (ref 32–36)
MCV RBC AUTO: 78.4 FL — LOW (ref 80–100)
PHOSPHATE SERPL-MCNC: 3.7 MG/DL — SIGNIFICANT CHANGE UP (ref 2.5–4.5)
PLATELET # BLD AUTO: 391 K/UL — SIGNIFICANT CHANGE UP (ref 150–400)
POTASSIUM SERPL-MCNC: 3.8 MMOL/L — SIGNIFICANT CHANGE UP (ref 3.5–5.3)
POTASSIUM SERPL-SCNC: 3.8 MMOL/L — SIGNIFICANT CHANGE UP (ref 3.5–5.3)
RBC # BLD: 4.4 M/UL — SIGNIFICANT CHANGE UP (ref 3.8–5.2)
RBC # FLD: 15.7 % — SIGNIFICANT CHANGE UP (ref 10.3–16.9)
SODIUM SERPL-SCNC: 137 MMOL/L — SIGNIFICANT CHANGE UP (ref 135–145)
WBC # BLD: 8.5 K/UL — SIGNIFICANT CHANGE UP (ref 3.8–10.5)
WBC # FLD AUTO: 8.5 K/UL — SIGNIFICANT CHANGE UP (ref 3.8–10.5)

## 2017-02-13 PROCEDURE — 74176 CT ABD & PELVIS W/O CONTRAST: CPT | Mod: 26

## 2017-02-13 PROCEDURE — 36569 INSJ PICC 5 YR+ W/O IMAGING: CPT

## 2017-02-13 PROCEDURE — 76937 US GUIDE VASCULAR ACCESS: CPT | Mod: 26

## 2017-02-13 RX ORDER — SODIUM CHLORIDE 9 MG/ML
10 INJECTION INTRAMUSCULAR; INTRAVENOUS; SUBCUTANEOUS
Qty: 0 | Refills: 0 | Status: DISCONTINUED | OUTPATIENT
Start: 2017-02-13 | End: 2017-02-15

## 2017-02-13 RX ORDER — SODIUM CHLORIDE 9 MG/ML
20 INJECTION INTRAMUSCULAR; INTRAVENOUS; SUBCUTANEOUS ONCE
Qty: 0 | Refills: 0 | Status: DISCONTINUED | OUTPATIENT
Start: 2017-02-13 | End: 2017-02-15

## 2017-02-13 RX ORDER — DIATRIZOATE MEGLUMINE 180 MG/ML
30 INJECTION, SOLUTION INTRAVESICAL ONCE
Qty: 0 | Refills: 0 | Status: COMPLETED | OUTPATIENT
Start: 2017-02-13 | End: 2017-02-13

## 2017-02-13 RX ORDER — ERTAPENEM SODIUM 1 G/1
1000 INJECTION, POWDER, LYOPHILIZED, FOR SOLUTION INTRAMUSCULAR; INTRAVENOUS EVERY 24 HOURS
Qty: 0 | Refills: 0 | Status: DISCONTINUED | OUTPATIENT
Start: 2017-02-14 | End: 2017-02-15

## 2017-02-13 RX ORDER — ERTAPENEM SODIUM 1 G/1
INJECTION, POWDER, LYOPHILIZED, FOR SOLUTION INTRAMUSCULAR; INTRAVENOUS
Qty: 0 | Refills: 0 | Status: DISCONTINUED | OUTPATIENT
Start: 2017-02-13 | End: 2017-02-15

## 2017-02-13 RX ORDER — ERTAPENEM SODIUM 1 G/1
1000 INJECTION, POWDER, LYOPHILIZED, FOR SOLUTION INTRAMUSCULAR; INTRAVENOUS ONCE
Qty: 0 | Refills: 0 | Status: COMPLETED | OUTPATIENT
Start: 2017-02-13 | End: 2017-02-13

## 2017-02-13 RX ORDER — POTASSIUM CHLORIDE 20 MEQ
20 PACKET (EA) ORAL ONCE
Qty: 0 | Refills: 0 | Status: COMPLETED | OUTPATIENT
Start: 2017-02-13 | End: 2017-02-13

## 2017-02-13 RX ORDER — SODIUM CHLORIDE 9 MG/ML
10 INJECTION INTRAMUSCULAR; INTRAVENOUS; SUBCUTANEOUS EVERY 12 HOURS
Qty: 0 | Refills: 0 | Status: DISCONTINUED | OUTPATIENT
Start: 2017-02-13 | End: 2017-02-15

## 2017-02-13 RX ADMIN — Medication 200 MILLIGRAM(S): at 05:31

## 2017-02-13 RX ADMIN — NYSTATIN CREAM 1 APPLICATION(S): 100000 CREAM TOPICAL at 18:41

## 2017-02-13 RX ADMIN — NYSTATIN CREAM 1 APPLICATION(S): 100000 CREAM TOPICAL at 05:31

## 2017-02-13 RX ADMIN — NYSTATIN CREAM 1 APPLICATION(S): 100000 CREAM TOPICAL at 12:04

## 2017-02-13 RX ADMIN — HEPARIN SODIUM 5000 UNIT(S): 5000 INJECTION INTRAVENOUS; SUBCUTANEOUS at 22:16

## 2017-02-13 RX ADMIN — HEPARIN SODIUM 5000 UNIT(S): 5000 INJECTION INTRAVENOUS; SUBCUTANEOUS at 13:23

## 2017-02-13 RX ADMIN — NYSTATIN CREAM 1 APPLICATION(S): 100000 CREAM TOPICAL at 00:27

## 2017-02-13 RX ADMIN — DIATRIZOATE MEGLUMINE 30 MILLILITER(S): 180 INJECTION, SOLUTION INTRAVESICAL at 12:24

## 2017-02-13 RX ADMIN — Medication 10 MILLIGRAM(S): at 15:20

## 2017-02-13 RX ADMIN — Medication 20 MILLIEQUIVALENT(S): at 13:23

## 2017-02-13 RX ADMIN — Medication 166.67 MILLIGRAM(S): at 00:27

## 2017-02-13 RX ADMIN — Medication 2: at 22:16

## 2017-02-13 RX ADMIN — HEPARIN SODIUM 5000 UNIT(S): 5000 INJECTION INTRAVENOUS; SUBCUTANEOUS at 05:31

## 2017-02-13 RX ADMIN — Medication 166.67 MILLIGRAM(S): at 12:01

## 2017-02-13 RX ADMIN — ONDANSETRON 4 MILLIGRAM(S): 8 TABLET, FILM COATED ORAL at 12:24

## 2017-02-13 RX ADMIN — ERTAPENEM SODIUM 120 MILLIGRAM(S): 1 INJECTION, POWDER, LYOPHILIZED, FOR SOLUTION INTRAMUSCULAR; INTRAVENOUS at 14:59

## 2017-02-13 RX ADMIN — Medication 100 MILLIGRAM(S): at 05:31

## 2017-02-13 NOTE — PROCEDURE NOTE - NSPROCDETAILS_GEN_ALL_CORE
supine position/ultrasound guidance/sterile technique, catheter placed/ultrasound assessment/sterile dressing applied/location identified, draped/prepped, sterile technique used

## 2017-02-13 NOTE — PROCEDURE NOTE - NSPOSTCAREGUIDE_GEN_A_CORE
Verbal/written post procedure instructions were given to patient/caregiver/Instructed patient/caregiver regarding signs and symptoms of infection/Keep the cast/splint/dressing clean and dry/Care for catheter as per unit/ICU protocols

## 2017-02-13 NOTE — PROGRESS NOTE ADULT - SUBJECTIVE AND OBJECTIVE BOX
S:  Pt distressed about not being home w/ her children.  Still reports night sweats and abdominal pain.    MEDICATIONS  (STANDING):  heparin  Injectable 5000Unit(s) SubCutaneous every 8 hours  metoprolol 100milliGRAM(s) Oral daily  ciprofloxacin   IVPB  IV Intermittent   metroNIDAZOLE  IVPB  IV Intermittent   ciprofloxacin   IVPB 400milliGRAM(s) IV Intermittent every 12 hours  metroNIDAZOLE  IVPB 500milliGRAM(s) IV Intermittent every 8 hours  vancomycin  IVPB 1250milliGRAM(s) IV Intermittent every 12 hours  insulin lispro (HumaLOG) corrective regimen sliding scale  SubCutaneous Before meals and at bedtime  dextrose 5%. 1000milliLiter(s) IV Continuous <Continuous>  dextrose 50% Injectable 12.5Gram(s) IV Push once  nystatin Powder 1Application(s) Topical four times a day  diatrizoate meglumine/diatrizoate sodium. 30milliLiter(s) Oral once    MEDICATIONS  (PRN):  ALBUTerol/ipratropium for Nebulization 3milliLiter(s) Nebulizer every 6 hours PRN Shortness of Breath and/or Wheezing  ondansetron Injectable 4milliGRAM(s) IV Push every 6 hours PRN Nausea and/or Vomiting  metoclopramide Injectable 10milliGRAM(s) IV Push every 6 hours PRN nausea  oxyCODONE  5 mG/acetaminophen 325 mG 1Tablet(s) Oral every 4 hours PRN Moderate Pain (4 - 6)  oxyCODONE  5 mG/acetaminophen 325 mG 2Tablet(s) Oral every 4 hours PRN Severe Pain (7 - 10)  dextrose Gel 1Dose(s) Oral once PRN Blood Glucose LESS THAN 70 milliGRAM(s)/deciliter  glucagon  Injectable 1milliGRAM(s) IntraMuscular once PRN Glucose LESS THAN 70 milligrams/deciliter      Vital Signs Last 24 Hrs  T(C): 36.7, Max: 36.8 (02-13 @ 06:00)  T(F): 98.1, Max: 98.2 (02-13 @ 06:00)  HR: 69 (69 - 95)  BP: 125/74 (110/73 - 139/80)  BP(mean): --  RR: 16 (16 - 17)  SpO2: 99% (95% - 100%)    PHYSICAL EXAM:  Constitutional: Well-developed, well-nourished, in NAD  Eyes: normal conjunctivae  ENMT: NCAT, MMM  Neck: supple, no JVD  Respiratory: CTAB  Cardiovascular: RRR, no M/G/R  Gastrointestinal: soft, NTND, no organomegaly  Genitourinary:  Rectal:  Extremities: WWP, no C/C/E  Vascular: 2+ radial and DP pulses B/L  Neurological: grossly intact  Skin: no rash  Lymph Nodes: No cervical LAD  Musculoskeletal: ROM intact  Psychiatric: normal affect    LABS:                        10.7   8.5   )-----------( 391      ( 13 Feb 2017 08:35 )             34.5     13 Feb 2017 08:35    137    |  105    |  8      ----------------------------<  124    3.8     |  26     |  0.67     Ca    8.2        13 Feb 2017 08:35  Phos  3.7       13 Feb 2017 08:35  Mg     2.1       13 Feb 2017 08:35      Culture - Body Fluid with Gram Stain (02.08.17 @ 06:33)    -  Gentamicin: S 0.032    -  Penicillin: S 0.032    -  Clindamycin: R 16    -  Vancomycin: S 0.125    Gram Stain:   Moderate White blood cells  No organisms seen    Specimen Source: .Body Fluid Abdominal Fluid    Culture Results:   Moderate Bacillus species not anthracis  Moderate mixed anaerobic chrystal including:  Peptostreptococcus species ... Beta lactamase negative  Prevotella intermedia (Previously Bacteroides) ... Beta lactamase positive  Organism most closely resemblingActinomyces meyeri ... Beta lactamase  negative    Organism Identification: Bacillus species not anthracis    Organism: Bacillus species not anthracis    Method Type: ETEST      37 yo F PMH smoking, DM2, p/w Superficial SSI s/p repair of ventral hernia - now s/p drainage of large abscess communicating with mesh. S:  Pt distressed about not being home w/ her children.  Still reports night sweats and abdominal pain.    MEDICATIONS  (STANDING):  heparin  Injectable 5000Unit(s) SubCutaneous every 8 hours  metoprolol 100milliGRAM(s) Oral daily  ciprofloxacin   IVPB  IV Intermittent   metroNIDAZOLE  IVPB  IV Intermittent   ciprofloxacin   IVPB 400milliGRAM(s) IV Intermittent every 12 hours  metroNIDAZOLE  IVPB 500milliGRAM(s) IV Intermittent every 8 hours  vancomycin  IVPB 1250milliGRAM(s) IV Intermittent every 12 hours  insulin lispro (HumaLOG) corrective regimen sliding scale  SubCutaneous Before meals and at bedtime  dextrose 5%. 1000milliLiter(s) IV Continuous <Continuous>  dextrose 50% Injectable 12.5Gram(s) IV Push once  nystatin Powder 1Application(s) Topical four times a day  diatrizoate meglumine/diatrizoate sodium. 30milliLiter(s) Oral once    MEDICATIONS  (PRN):  ALBUTerol/ipratropium for Nebulization 3milliLiter(s) Nebulizer every 6 hours PRN Shortness of Breath and/or Wheezing  ondansetron Injectable 4milliGRAM(s) IV Push every 6 hours PRN Nausea and/or Vomiting  metoclopramide Injectable 10milliGRAM(s) IV Push every 6 hours PRN nausea  oxyCODONE  5 mG/acetaminophen 325 mG 1Tablet(s) Oral every 4 hours PRN Moderate Pain (4 - 6)  oxyCODONE  5 mG/acetaminophen 325 mG 2Tablet(s) Oral every 4 hours PRN Severe Pain (7 - 10)  dextrose Gel 1Dose(s) Oral once PRN Blood Glucose LESS THAN 70 milliGRAM(s)/deciliter  glucagon  Injectable 1milliGRAM(s) IntraMuscular once PRN Glucose LESS THAN 70 milligrams/deciliter      Vital Signs Last 24 Hrs  T(C): 36.7, Max: 36.8 (02-13 @ 06:00)  T(F): 98.1, Max: 98.2 (02-13 @ 06:00)  HR: 69 (69 - 95)  BP: 125/74 (110/73 - 139/80)  BP(mean): --  RR: 16 (16 - 17)  SpO2: 99% (95% - 100%)    PHYSICAL EXAM:  Constitutional: obese, NAD  Eyes: normal conjunctivae  ENMT: NCAT, MMM  Neck: supple, no JVD  Respiratory: CTAB  Cardiovascular: RRR, no M/G/R  Gastrointestinal: slightly firm to R of ABBEI drain.  tender lower abdomen, surgical site wounds clean, ABBIE w. yellow/brown fluid  Extremities: WWP, no C/C/E  Vascular: 2+ radial and DP pulses B/L  Lymph Nodes: No cervical LAD  Musculoskeletal: ROM intact  Psychiatric: normal affect    LABS:                        10.7   8.5   )-----------( 391      ( 13 Feb 2017 08:35 )             34.5     13 Feb 2017 08:35    137    |  105    |  8      ----------------------------<  124    3.8     |  26     |  0.67     Ca    8.2        13 Feb 2017 08:35  Phos  3.7       13 Feb 2017 08:35  Mg     2.1       13 Feb 2017 08:35      Culture - Body Fluid with Gram Stain (02.08.17 @ 06:33)    -  Gentamicin: S 0.032    -  Penicillin: S 0.032    -  Clindamycin: R 16    -  Vancomycin: S 0.125    Gram Stain:   Moderate White blood cells  No organisms seen    Specimen Source: .Body Fluid Abdominal Fluid    Culture Results:   Moderate Bacillus species not anthracis  Moderate mixed anaerobic chrystal including:  Peptostreptococcus species ... Beta lactamase negative  Prevotella intermedia (Previously Bacteroides) ... Beta lactamase positive  Organism most closely resemblingActinomyces meyeri ... Beta lactamase  negative    Organism Identification: Bacillus species not anthracis    Organism: Bacillus species not anthracis    Method Type: ETEST      35 yo F PMH smoking, DM2, p/w Superficial SSI s/p repair of ventral hernia - now s/p drainage of large abscess communicating with mesh, w/ culture growing mixed species.  -recommend dc current atx.   -start 1 g ertapenem q 24 hrs to cover microbes in abdominal fluid; will need PICC line   -pending repeat CT a/p to monitor abscess

## 2017-02-13 NOTE — PROGRESS NOTE ADULT - ATTENDING COMMENTS
Infectious Diseases Attending    The abscess drainage cultures are growing a polymicrobial chrystal including Prevotella, Peptostreprococcus, other anaerobes, and some gram-positive rods.  She still has some deep pain, especially when out of bed, but drainage has turned serosanguinous and is of decreasing volume.    I would like to switch her from her current complex regimen to ertaopenem in anticipation of sending her home on intravenous antibiotics; because of her Hx of PCN allergy, it would be best to give her the first few doses in-hospital.  Additionally, I would like to repeat the CT scan, now a week after drainage, to see how well we are draining the collections superior to and inferior to the mesh.  I expect that she will require mesh removal at a later date.    Recommend:    1. D/C Vanco/Flagyl/Cipro  2. Ertapenem 1 gm iv qd - first dose today  3. CT scan as planned.  4. she will need PICL    Thank you, will follow.

## 2017-02-13 NOTE — CONSULT NOTE ADULT - SUBJECTIVE AND OBJECTIVE BOX
Vascular Access Service Consult Note    36yFemaleHEALTH ISSUES - PROBLEM Dx:             Diagnosis: anterior abdominal abscess     Indications for Vascular Access (Check all that apply)  [ x ]  Antibiotic Therapy       Antibiotic Prescribed: ertapenem                                                     Expected Duration of Therapy:  14 days   [  ]  IV Hydration  [  ]  Total Parenteral Nutrition  [  ]  Chemotherapy  [  ]  Difficult Venous Access  [  ]  CVP monitoring  [  ]  Medications with high potential for tissue necrosis on extravasation  [  ]  Other    Screening (Check all that apply)  Previous Radiation to chest  [  ] Yes      [ x ]  No  Breast Cancer                          [  ] Left     [  ]  Right    [ x ]  No  Lymph Node Dissection         [  ] Left     [  ]  Right    [x  ]  No  Pacemaker or ICD                   [  ] Left     [  ]  Right    [ x ]  No  Upper Extremity DVT             [  ] Left     [  ]  Right    [  x]  No  Chronic Kidney Disease         [  ]  Yes     [x  ]  No  Hemodialysis                           [  ]  Yes     [ x ]  No  AV Fistula/ Graft                     [  ]  Left    [  ]  Right    [ x ]  No  Temp>101F in past 24 H       [  ]  Yes     [ x ]  No  H/O PICC/Midline                   [  ]  Yes     [  x]  No    Lab data:                        10.7   8.5   )-----------( 391      ( 13 Feb 2017 08:35 )             34.5     13 Feb 2017 08:35    137    |  105    |  8      ----------------------------<  124    3.8     |  26     |  0.67     Ca    8.2        13 Feb 2017 08:35  Phos  3.7       13 Feb 2017 08:35  Mg     2.1       13 Feb 2017 08:35                I have reviewed the chart, interviewed and examined the patient and determined that this patient:  [x  ] Is a candidate for a PICC line  [  ] Is a candidate for a Midline  [  ] Is not a candidate for vascular access device (reason)    Lumens:    [ x ] Single  [  ] Double

## 2017-02-13 NOTE — PROGRESS NOTE ADULT - ASSESSMENT
36F s/p ventral hernia repair with infected seroma versus abscess, now s/p IR drainage    -Reg diet, CC  -Vanc/cipro/flagyl  -f/u ID recs   -f/u Cx  -Home meds and ISS  -DVT ppx  -Pain/nausea control prn  -OOB ambulation  CT abd/pelvis in AM

## 2017-02-13 NOTE — PROGRESS NOTE ADULT - SUBJECTIVE AND OBJECTIVE BOX
O/N: FAUSTINO, Vanc trough 10.1, maintained dose, repeat CT ordered for AM.  2/12: Switched to nystatin powder for moist pannus fungal rash    STATUS POST:  ventral hernia repair, re-admitted with infected seroma s/p IR drainage O/N: FAUSTINO, Vanc trough 10.1, maintained dose, repeat CT ordered for AM.  2/12: Switched to nystatin powder for moist pannus fungal rash    STATUS POST:  ventral hernia repair, re-admitted with infected seroma s/p IR drainage        SUBJECTIVE:  Flatus: [ ] YES [ ] NO             Bowel Movement: [ ] YES [ ] NO  Pain (0-10):            Pain Control Adequate: [x ] YES [ ] NO  Nausea: [ ] YES [x ] NO            Vomiting: [ ] YES [x ] NO  Diarrhea: [ ] YES [ ] NO         Constipation: [ ] YES [ ] NO     Chest Pain: [ ] YES [ x] NO    SOB:  [ ] YES [x ] NO    MEDICATIONS  (STANDING):  heparin  Injectable 5000Unit(s) SubCutaneous every 8 hours  metoprolol 100milliGRAM(s) Oral daily  insulin lispro (HumaLOG) corrective regimen sliding scale  SubCutaneous Before meals and at bedtime  dextrose 5%. 1000milliLiter(s) IV Continuous <Continuous>  dextrose 50% Injectable 12.5Gram(s) IV Push once  nystatin Powder 1Application(s) Topical four times a day  ertapenem  IVPB  IV Intermittent     MEDICATIONS  (PRN):  ALBUTerol/ipratropium for Nebulization 3milliLiter(s) Nebulizer every 6 hours PRN Shortness of Breath and/or Wheezing  ondansetron Injectable 4milliGRAM(s) IV Push every 6 hours PRN Nausea and/or Vomiting  metoclopramide Injectable 10milliGRAM(s) IV Push every 6 hours PRN nausea  oxyCODONE  5 mG/acetaminophen 325 mG 1Tablet(s) Oral every 4 hours PRN Moderate Pain (4 - 6)  oxyCODONE  5 mG/acetaminophen 325 mG 2Tablet(s) Oral every 4 hours PRN Severe Pain (7 - 10)  dextrose Gel 1Dose(s) Oral once PRN Blood Glucose LESS THAN 70 milliGRAM(s)/deciliter  glucagon  Injectable 1milliGRAM(s) IntraMuscular once PRN Glucose LESS THAN 70 milligrams/deciliter      Vital Signs Last 24 Hrs  T(C): 36.8, Max: 36.8 (02-13 @ 06:00)  T(F): 98.3, Max: 98.3 (02-13 @ 16:02)  HR: 58 (58 - 81)  BP: 138/78 (110/73 - 140/71)  BP(mean): --  RR: 17 (15 - 17)  SpO2: 94% (94% - 100%)    Physical Exam:  General: NAD, resting comfortably in bed  Pulmonary: Nonlabored breathing, no respiratory distress  Cardiovascular: NSR  Abdominal: soft, obese, TTP over incision  Extremities: WWP, normal strength  Neuro: A/O x 3, CNs II-XII grossly intact, no focal deficits, normal motor/sensation  Pulses: palpable distal pulses    I&O's Summary  I & Os for 24h ending 13 Feb 2017 07:00  =============================================  IN: 1470 ml / OUT: 1540 ml / NET: -70 ml    I & Os for current day (as of 13 Feb 2017 16:23)  =============================================  IN: 780 ml / OUT: 1095 ml / NET: -315 ml      LABS:                        10.7   8.5   )-----------( 391      ( 13 Feb 2017 08:35 )             34.5     13 Feb 2017 08:35    137    |  105    |  8      ----------------------------<  124    3.8     |  26     |  0.67     Ca    8.2        13 Feb 2017 08:35  Phos  3.7       13 Feb 2017 08:35  Mg     2.1       13 Feb 2017 08:35          CAPILLARY BLOOD GLUCOSE  138 (13 Feb 2017 16:02)  150 (13 Feb 2017 11:13)  129 (13 Feb 2017 07:27)        RADIOLOGY & ADDITIONAL STUDIES:

## 2017-02-14 LAB
ANION GAP SERPL CALC-SCNC: 7 MMOL/L — LOW (ref 9–16)
BUN SERPL-MCNC: 7 MG/DL — SIGNIFICANT CHANGE UP (ref 7–23)
CALCIUM SERPL-MCNC: 8.5 MG/DL — SIGNIFICANT CHANGE UP (ref 8.5–10.5)
CHLORIDE SERPL-SCNC: 103 MMOL/L — SIGNIFICANT CHANGE UP (ref 96–108)
CO2 SERPL-SCNC: 27 MMOL/L — SIGNIFICANT CHANGE UP (ref 22–31)
CREAT SERPL-MCNC: 0.74 MG/DL — SIGNIFICANT CHANGE UP (ref 0.5–1.3)
GLUCOSE SERPL-MCNC: 99 MG/DL — SIGNIFICANT CHANGE UP (ref 70–99)
HCT VFR BLD CALC: 36.8 % — SIGNIFICANT CHANGE UP (ref 34.5–45)
HGB BLD-MCNC: 11.6 G/DL — SIGNIFICANT CHANGE UP (ref 11.5–15.5)
MAGNESIUM SERPL-MCNC: 2.1 MG/DL — SIGNIFICANT CHANGE UP (ref 1.6–2.4)
MCHC RBC-ENTMCNC: 24.8 PG — LOW (ref 27–34)
MCHC RBC-ENTMCNC: 31.5 G/DL — LOW (ref 32–36)
MCV RBC AUTO: 78.6 FL — LOW (ref 80–100)
PHOSPHATE SERPL-MCNC: 3.2 MG/DL — SIGNIFICANT CHANGE UP (ref 2.5–4.5)
PLATELET # BLD AUTO: 402 K/UL — HIGH (ref 150–400)
POTASSIUM SERPL-MCNC: 3.8 MMOL/L — SIGNIFICANT CHANGE UP (ref 3.5–5.3)
POTASSIUM SERPL-SCNC: 3.8 MMOL/L — SIGNIFICANT CHANGE UP (ref 3.5–5.3)
RBC # BLD: 4.68 M/UL — SIGNIFICANT CHANGE UP (ref 3.8–5.2)
RBC # FLD: 16.3 % — SIGNIFICANT CHANGE UP (ref 10.3–16.9)
SODIUM SERPL-SCNC: 137 MMOL/L — SIGNIFICANT CHANGE UP (ref 135–145)
WBC # BLD: 8.7 K/UL — SIGNIFICANT CHANGE UP (ref 3.8–10.5)
WBC # FLD AUTO: 8.7 K/UL — SIGNIFICANT CHANGE UP (ref 3.8–10.5)

## 2017-02-14 PROCEDURE — 99231 SBSQ HOSP IP/OBS SF/LOW 25: CPT

## 2017-02-14 RX ORDER — ACETAMINOPHEN 500 MG
650 TABLET ORAL ONCE
Qty: 0 | Refills: 0 | Status: COMPLETED | OUTPATIENT
Start: 2017-02-14 | End: 2017-02-14

## 2017-02-14 RX ORDER — FAMOTIDINE 10 MG/ML
20 INJECTION INTRAVENOUS ONCE
Qty: 0 | Refills: 0 | Status: DISCONTINUED | OUTPATIENT
Start: 2017-02-14 | End: 2017-02-15

## 2017-02-14 RX ORDER — POTASSIUM CHLORIDE 20 MEQ
20 PACKET (EA) ORAL ONCE
Qty: 0 | Refills: 0 | Status: COMPLETED | OUTPATIENT
Start: 2017-02-14 | End: 2017-02-14

## 2017-02-14 RX ADMIN — NYSTATIN CREAM 1 APPLICATION(S): 100000 CREAM TOPICAL at 17:41

## 2017-02-14 RX ADMIN — HEPARIN SODIUM 5000 UNIT(S): 5000 INJECTION INTRAVENOUS; SUBCUTANEOUS at 23:44

## 2017-02-14 RX ADMIN — Medication 100 MILLIGRAM(S): at 05:38

## 2017-02-14 RX ADMIN — HEPARIN SODIUM 5000 UNIT(S): 5000 INJECTION INTRAVENOUS; SUBCUTANEOUS at 15:01

## 2017-02-14 RX ADMIN — Medication 650 MILLIGRAM(S): at 23:45

## 2017-02-14 RX ADMIN — HEPARIN SODIUM 5000 UNIT(S): 5000 INJECTION INTRAVENOUS; SUBCUTANEOUS at 05:38

## 2017-02-14 RX ADMIN — Medication 4: at 17:40

## 2017-02-14 RX ADMIN — NYSTATIN CREAM 1 APPLICATION(S): 100000 CREAM TOPICAL at 05:43

## 2017-02-14 RX ADMIN — Medication 2: at 12:44

## 2017-02-14 RX ADMIN — ERTAPENEM SODIUM 120 MILLIGRAM(S): 1 INJECTION, POWDER, LYOPHILIZED, FOR SOLUTION INTRAMUSCULAR; INTRAVENOUS at 13:36

## 2017-02-14 RX ADMIN — Medication 20 MILLIEQUIVALENT(S): at 10:25

## 2017-02-14 RX ADMIN — Medication 10 MILLIGRAM(S): at 10:39

## 2017-02-14 RX ADMIN — NYSTATIN CREAM 1 APPLICATION(S): 100000 CREAM TOPICAL at 12:44

## 2017-02-14 RX ADMIN — Medication 2: at 23:44

## 2017-02-14 RX ADMIN — NYSTATIN CREAM 1 APPLICATION(S): 100000 CREAM TOPICAL at 23:44

## 2017-02-14 NOTE — PROGRESS NOTE ADULT - SUBJECTIVE AND OBJECTIVE BOX
Patient is a 36yFemale with PICC line/midline for IV access, placed on 2/13/17.  Patient seen and examined at bedside, line visualized. Line insertion site is covered with sterile dressing, last changed on 2/13/17, with biopatch in place. Swabcaps noted to be in place appropriately. Line site is clean, dry, intact, with no erythema, drainage, or tenderness to palpation. Line is functioning well, no issues.

## 2017-02-14 NOTE — PROGRESS NOTE ADULT - ATTENDING COMMENTS
Infectious Diseases Attending:    The repeat CT shows a decrease in size of the collection, but it is still quite large: 11 x 8x 1.4 cm.    The cultures are growing a cornucopia of gut chrystal, many of which are anaerobes.    I have discussed the case with Dr. Hurtado (surgery attending), and expressed my sense that this infection cannot be cured without mesh removal.  He will discuss the case with the surgery team and schedule the patient for definitive surgical treatment.    Ertapenem should be continued at present.

## 2017-02-14 NOTE — PROGRESS NOTE ADULT - SUBJECTIVE AND OBJECTIVE BOX
O/N: CT showed dec size of collection, feeling better, ooba, tolerating reg diet.  2/13: Refusing PO contrast. Smoking ecigs in her room. PICC line ordered. CT abd/pelvis ordered. ID recommending ertapenem.    STATUS POST:  ventral hernia repair, re-admitted with infected seroma s/p IR drainage                                          CT A/P 2/13:   Evaluation of bowel is limited due to paucity of oral contrast. No bowel   obstruction or free air. Status post ventral hernia repair in the pelvis.   Percutaneous drainage catheter is noted within the previously described   anterior pelvic wall air/fluid collection which has significantly   decreased in size in the interval. On the current study, the fluid   collection measures 11 cm transversely x 8.2 cm in cephalocaudal length x   1.4 cm in AP diameter, down from 18.6 cm transversely x 11.5 cm in   cephalocaudal length x 6.9 cm in AP diameter on the prior study. A few   bubbles of air within the collection again seen. Anterior pelvic wall   mesh is noted. There is haziness and stranding of the rectus abdominis   muscles consistent with inflammatory changes/myositis. No ascites is   evident. O/N: CT showed dec size of collection, feeling better, ooba, tolerating reg diet.  2/13: Refusing PO contrast. Smoking ecigs in her room. PICC line ordered. CT abd/pelvis ordered. ID recommending ertapenem.    STATUS POST:  ventral hernia repair, re-admitted with infected seroma s/p IR drainage        SUBJECTIVE:  Flatus: [ ] YES [ ] NO             Bowel Movement: [ ] YES [ ] NO  Pain (0-10):            Pain Control Adequate: [x ] YES [ ] NO  Nausea: [ ] YES [x ] NO            Vomiting: [ ] YES [x ] NO  Diarrhea: [ ] YES [ ] NO         Constipation: [ ] YES [ ] NO     Chest Pain: [ ] YES [ ] NO    SOB:  [ ] YES [ ] NO    MEDICATIONS  (STANDING):  heparin  Injectable 5000Unit(s) SubCutaneous every 8 hours  metoprolol 100milliGRAM(s) Oral daily  insulin lispro (HumaLOG) corrective regimen sliding scale  SubCutaneous Before meals and at bedtime  dextrose 5%. 1000milliLiter(s) IV Continuous <Continuous>  dextrose 50% Injectable 12.5Gram(s) IV Push once  nystatin Powder 1Application(s) Topical four times a day  ertapenem  IVPB  IV Intermittent   ertapenem  IVPB 1000milliGRAM(s) IV Intermittent every 24 hours  sodium chloride 0.9% lock flush 20milliLiter(s) IV Push once  potassium chloride    Tablet ER 20milliEquivalent(s) Oral once    MEDICATIONS  (PRN):  ALBUTerol/ipratropium for Nebulization 3milliLiter(s) Nebulizer every 6 hours PRN Shortness of Breath and/or Wheezing  ondansetron Injectable 4milliGRAM(s) IV Push every 6 hours PRN Nausea and/or Vomiting  metoclopramide Injectable 10milliGRAM(s) IV Push every 6 hours PRN nausea  oxyCODONE  5 mG/acetaminophen 325 mG 1Tablet(s) Oral every 4 hours PRN Moderate Pain (4 - 6)  oxyCODONE  5 mG/acetaminophen 325 mG 2Tablet(s) Oral every 4 hours PRN Severe Pain (7 - 10)  dextrose Gel 1Dose(s) Oral once PRN Blood Glucose LESS THAN 70 milliGRAM(s)/deciliter  glucagon  Injectable 1milliGRAM(s) IntraMuscular once PRN Glucose LESS THAN 70 milligrams/deciliter  sodium chloride 0.9% lock flush 10milliLiter(s) IV Push every 1 hour PRN After each medication administration  sodium chloride 0.9% lock flush 10milliLiter(s) IV Push every 12 hours PRN Lumen of catheter NOT used      Vital Signs Last 24 Hrs  T(C): 36.8, Max: 36.8 (02-13 @ 16:02)  T(F): 98.3, Max: 98.3 (02-13 @ 16:02)  HR: 73 (58 - 73)  BP: 141/86 (138/78 - 141/86)  BP(mean): --  RR: 18 (15 - 18)  SpO2: 99% (94% - 99%)    Physical Exam:  General: NAD, resting comfortably in bed  Pulmonary: Nonlabored breathing, no respiratory distress  Cardiovascular: NSR  Abdominal: soft, NT/ND, ABBIE serous output is minimal, low transverse incision dry with powder, minimal erythema, no purulence  Extremities: WWP, normal strength  Neuro: A/O x 3, CNs II-XII grossly intact, no focal deficits, normal motor/sensation  Pulses: palpable distal pulses    I&O's Summary    I & Os for current day (as of 14 Feb 2017 09:33)  =============================================  IN: 1140 ml / OUT: 2383 ml / NET: -1243 ml      LABS:                        11.6   8.7   )-----------( 402      ( 14 Feb 2017 06:37 )             36.8     14 Feb 2017 06:37    137    |  103    |  7      ----------------------------<  99     3.8     |  27     |  0.74     Ca    8.5        14 Feb 2017 06:37  Phos  3.2       14 Feb 2017 06:37  Mg     2.1       14 Feb 2017 06:37          CAPILLARY BLOOD GLUCOSE  113 (14 Feb 2017 06:11)  164 (13 Feb 2017 21:17)  138 (13 Feb 2017 16:02)  150 (13 Feb 2017 11:13)        RADIOLOGY & ADDITIONAL STUDIES:                                            CT A/P 2/13:   Evaluation of bowel is limited due to paucity of oral contrast. No bowel   obstruction or free air. Status post ventral hernia repair in the pelvis.   Percutaneous drainage catheter is noted within the previously described   anterior pelvic wall air/fluid collection which has significantly   decreased in size in the interval. On the current study, the fluid   collection measures 11 cm transversely x 8.2 cm in cephalocaudal length x   1.4 cm in AP diameter, down from 18.6 cm transversely x 11.5 cm in   cephalocaudal length x 6.9 cm in AP diameter on the prior study. A few   bubbles of air within the collection again seen. Anterior pelvic wall   mesh is noted. There is haziness and stranding of the rectus abdominis   muscles consistent with inflammatory changes/myositis. No ascites is   evident.

## 2017-02-14 NOTE — PROGRESS NOTE ADULT - ASSESSMENT
36F s/p ventral hernia repair with infected seroma versus abscess, now s/p IR drainage    -Reg diet, CC  -ertapenem  -f/u ID recs   -Home meds and ISS  -DVT ppx  -Pain/nausea control prn  -OOB ambulation 36F s/p ventral hernia repair with infected seroma versus abscess, now s/p IR drainage    -Reg diet, CC  -ertapenem  -f/u ID recs   -Home meds and ISS  -DVT ppx  -Pain/nausea control prn  -OOB ambulation  - f/u SW for IV infusions of ertapenam  - f/u duration recs from ID

## 2017-02-14 NOTE — PROGRESS NOTE ADULT - SUBJECTIVE AND OBJECTIVE BOX
S:  Pt with less sweats ON.  Abdominal pain improving.  Wanting to go home.    MEDICATIONS  (STANDING):  heparin  Injectable 5000Unit(s) SubCutaneous every 8 hours  metoprolol 100milliGRAM(s) Oral daily  insulin lispro (HumaLOG) corrective regimen sliding scale  SubCutaneous Before meals and at bedtime  dextrose 5%. 1000milliLiter(s) IV Continuous <Continuous>  dextrose 50% Injectable 12.5Gram(s) IV Push once  nystatin Powder 1Application(s) Topical four times a day  ertapenem  IVPB  IV Intermittent   ertapenem  IVPB 1000milliGRAM(s) IV Intermittent every 24 hours  sodium chloride 0.9% lock flush 20milliLiter(s) IV Push once    MEDICATIONS  (PRN):  ALBUTerol/ipratropium for Nebulization 3milliLiter(s) Nebulizer every 6 hours PRN Shortness of Breath and/or Wheezing  ondansetron Injectable 4milliGRAM(s) IV Push every 6 hours PRN Nausea and/or Vomiting  metoclopramide Injectable 10milliGRAM(s) IV Push every 6 hours PRN nausea  oxyCODONE  5 mG/acetaminophen 325 mG 1Tablet(s) Oral every 4 hours PRN Moderate Pain (4 - 6)  oxyCODONE  5 mG/acetaminophen 325 mG 2Tablet(s) Oral every 4 hours PRN Severe Pain (7 - 10)  dextrose Gel 1Dose(s) Oral once PRN Blood Glucose LESS THAN 70 milliGRAM(s)/deciliter  glucagon  Injectable 1milliGRAM(s) IntraMuscular once PRN Glucose LESS THAN 70 milligrams/deciliter  sodium chloride 0.9% lock flush 10milliLiter(s) IV Push every 1 hour PRN After each medication administration  sodium chloride 0.9% lock flush 10milliLiter(s) IV Push every 12 hours PRN Lumen of catheter NOT used      Vital Signs Last 24 Hrs  T(C): 36.7, Max: 36.8 (02-13 @ 16:02)  T(F): 98.1, Max: 98.3 (02-13 @ 16:02)  HR: 61 (58 - 73)  BP: 131/83 (131/83 - 141/86)  BP(mean): --  RR: 16 (15 - 18)  SpO2: 100% (94% - 100%)    Drain outpt 83 cc ON    PHYSICAL EXAM:  Constitutional: obese,  NAD  Eyes: normal conjunctivae  ENMT: NCAT, MMM  Neck: supple, no JVD  Respiratory: CTAB  Cardiovascular: RRR, no M/G/R  Gastrointestinal: soft, tender near surgical site, which is clean.  ABBIE drain with serosanguinous drainage  Extremities: WWP, no C/C/E  Vascular: 2+ radial and DP pulses B/L  Neurological: no focal deficits  Lymph Nodes: No cervical LAD  Musculoskeletal: ROM intact  Psychiatric: normal affect    LABS:                        11.6   8.7   )-----------( 402      ( 14 Feb 2017 06:37 )             36.8     14 Feb 2017 06:37    137    |  103    |  7      ----------------------------<  99     3.8     |  27     |  0.74     Ca    8.5        14 Feb 2017 06:37  Phos  3.2       14 Feb 2017 06:37  Mg     2.1       14 Feb 2017 06:37      RADIOLOGY & ADDITIONAL STUDIES:  CT a/p   IMPRESSION:  Significant interval decrease size of the air/fluid collection in the   anterior pelvic wall containing a percutaneous drainage catheter.    37 yo F PMH smoking, DM2, p/w Superficial SSI s/p repair of ventral hernia - now s/p drainage of large abscess communicating with mesh, w/ culture growing mixed species.  Repeat CT scan showing decrease in abscess collection size. S:  Pt with less sweats ON.  Abdominal pain improving.  Wanting to go home.    MEDICATIONS  (STANDING):  heparin  Injectable 5000Unit(s) SubCutaneous every 8 hours  metoprolol 100milliGRAM(s) Oral daily  insulin lispro (HumaLOG) corrective regimen sliding scale  SubCutaneous Before meals and at bedtime  dextrose 5%. 1000milliLiter(s) IV Continuous <Continuous>  dextrose 50% Injectable 12.5Gram(s) IV Push once  nystatin Powder 1Application(s) Topical four times a day  ertapenem  IVPB  IV Intermittent   ertapenem  IVPB 1000milliGRAM(s) IV Intermittent every 24 hours  sodium chloride 0.9% lock flush 20milliLiter(s) IV Push once    MEDICATIONS  (PRN):  ALBUTerol/ipratropium for Nebulization 3milliLiter(s) Nebulizer every 6 hours PRN Shortness of Breath and/or Wheezing  ondansetron Injectable 4milliGRAM(s) IV Push every 6 hours PRN Nausea and/or Vomiting  metoclopramide Injectable 10milliGRAM(s) IV Push every 6 hours PRN nausea  oxyCODONE  5 mG/acetaminophen 325 mG 1Tablet(s) Oral every 4 hours PRN Moderate Pain (4 - 6)  oxyCODONE  5 mG/acetaminophen 325 mG 2Tablet(s) Oral every 4 hours PRN Severe Pain (7 - 10)  dextrose Gel 1Dose(s) Oral once PRN Blood Glucose LESS THAN 70 milliGRAM(s)/deciliter  glucagon  Injectable 1milliGRAM(s) IntraMuscular once PRN Glucose LESS THAN 70 milligrams/deciliter  sodium chloride 0.9% lock flush 10milliLiter(s) IV Push every 1 hour PRN After each medication administration  sodium chloride 0.9% lock flush 10milliLiter(s) IV Push every 12 hours PRN Lumen of catheter NOT used      Vital Signs Last 24 Hrs  T(C): 36.7, Max: 36.8 (02-13 @ 16:02)  T(F): 98.1, Max: 98.3 (02-13 @ 16:02)  HR: 61 (58 - 73)  BP: 131/83 (131/83 - 141/86)  BP(mean): --  RR: 16 (15 - 18)  SpO2: 100% (94% - 100%)    Drain outpt 83 cc ON    PHYSICAL EXAM:  Constitutional: obese,  NAD  Eyes: normal conjunctivae  ENMT: NCAT, MMM  Neck: supple, no JVD  Respiratory: CTAB  Cardiovascular: RRR, no M/G/R  Gastrointestinal: soft, tender near surgical site, which is clean.  ABBIE drain with serosanguinous drainage  Extremities: WWP, no C/C/E  Vascular: 2+ radial and DP pulses B/L  Neurological: no focal deficits  Lymph Nodes: No cervical LAD  Musculoskeletal: ROM intact  Psychiatric: normal affect    LABS:                        11.6   8.7   )-----------( 402      ( 14 Feb 2017 06:37 )             36.8     14 Feb 2017 06:37    137    |  103    |  7      ----------------------------<  99     3.8     |  27     |  0.74     Ca    8.5        14 Feb 2017 06:37  Phos  3.2       14 Feb 2017 06:37  Mg     2.1       14 Feb 2017 06:37      RADIOLOGY & ADDITIONAL STUDIES:  CT a/p   IMPRESSION:  Significant interval decrease size of the air/fluid collection in the   anterior pelvic wall containing a percutaneous drainage catheter.    37 yo F PMH smoking, DM2, p/w Superficial SSI s/p repair of ventral hernia - now s/p drainage of large abscess communicating with mesh, w/ culture growing mixed species.  Repeat CT scan showing decrease in abscess collection size; however, mesh is within polymicrobial fluid environment.  -recommend mesh removal, as it is infected within polymicrobial environment  -continue IV ertapenem

## 2017-02-15 ENCOUNTER — TRANSCRIPTION ENCOUNTER (OUTPATIENT)
Age: 37
End: 2017-02-15

## 2017-02-15 VITALS
DIASTOLIC BLOOD PRESSURE: 78 MMHG | HEART RATE: 70 BPM | TEMPERATURE: 97 F | SYSTOLIC BLOOD PRESSURE: 148 MMHG | RESPIRATION RATE: 17 BRPM | OXYGEN SATURATION: 99 %

## 2017-02-15 LAB
ANION GAP SERPL CALC-SCNC: 9 MMOL/L — SIGNIFICANT CHANGE UP (ref 9–16)
BUN SERPL-MCNC: 9 MG/DL — SIGNIFICANT CHANGE UP (ref 7–23)
CALCIUM SERPL-MCNC: 8.5 MG/DL — SIGNIFICANT CHANGE UP (ref 8.5–10.5)
CHLORIDE SERPL-SCNC: 105 MMOL/L — SIGNIFICANT CHANGE UP (ref 96–108)
CO2 SERPL-SCNC: 25 MMOL/L — SIGNIFICANT CHANGE UP (ref 22–31)
CREAT SERPL-MCNC: 0.75 MG/DL — SIGNIFICANT CHANGE UP (ref 0.5–1.3)
GLUCOSE SERPL-MCNC: 113 MG/DL — HIGH (ref 70–99)
HCT VFR BLD CALC: 37.5 % — SIGNIFICANT CHANGE UP (ref 34.5–45)
HGB BLD-MCNC: 12 G/DL — SIGNIFICANT CHANGE UP (ref 11.5–15.5)
MAGNESIUM SERPL-MCNC: 2.3 MG/DL — SIGNIFICANT CHANGE UP (ref 1.6–2.4)
MCHC RBC-ENTMCNC: 25.1 PG — LOW (ref 27–34)
MCHC RBC-ENTMCNC: 32 G/DL — SIGNIFICANT CHANGE UP (ref 32–36)
MCV RBC AUTO: 78.5 FL — LOW (ref 80–100)
PHOSPHATE SERPL-MCNC: 3.7 MG/DL — SIGNIFICANT CHANGE UP (ref 2.5–4.5)
PLATELET # BLD AUTO: 430 K/UL — HIGH (ref 150–400)
POTASSIUM SERPL-MCNC: 3.7 MMOL/L — SIGNIFICANT CHANGE UP (ref 3.5–5.3)
POTASSIUM SERPL-SCNC: 3.7 MMOL/L — SIGNIFICANT CHANGE UP (ref 3.5–5.3)
RBC # BLD: 4.78 M/UL — SIGNIFICANT CHANGE UP (ref 3.8–5.2)
RBC # FLD: 16.3 % — SIGNIFICANT CHANGE UP (ref 10.3–16.9)
SODIUM SERPL-SCNC: 139 MMOL/L — SIGNIFICANT CHANGE UP (ref 135–145)
WBC # BLD: 8.8 K/UL — SIGNIFICANT CHANGE UP (ref 3.8–10.5)
WBC # FLD AUTO: 8.8 K/UL — SIGNIFICANT CHANGE UP (ref 3.8–10.5)

## 2017-02-15 PROCEDURE — 87086 URINE CULTURE/COLONY COUNT: CPT

## 2017-02-15 PROCEDURE — 86900 BLOOD TYPING SEROLOGIC ABO: CPT

## 2017-02-15 PROCEDURE — 80202 ASSAY OF VANCOMYCIN: CPT

## 2017-02-15 PROCEDURE — 83735 ASSAY OF MAGNESIUM: CPT

## 2017-02-15 PROCEDURE — 83605 ASSAY OF LACTIC ACID: CPT

## 2017-02-15 PROCEDURE — 96374 THER/PROPH/DIAG INJ IV PUSH: CPT

## 2017-02-15 PROCEDURE — 80053 COMPREHEN METABOLIC PANEL: CPT

## 2017-02-15 PROCEDURE — 87070 CULTURE OTHR SPECIMN AEROBIC: CPT

## 2017-02-15 PROCEDURE — 86850 RBC ANTIBODY SCREEN: CPT

## 2017-02-15 PROCEDURE — 81025 URINE PREGNANCY TEST: CPT

## 2017-02-15 PROCEDURE — 81001 URINALYSIS AUTO W/SCOPE: CPT

## 2017-02-15 PROCEDURE — C1769: CPT

## 2017-02-15 PROCEDURE — 85027 COMPLETE CBC AUTOMATED: CPT

## 2017-02-15 PROCEDURE — 87040 BLOOD CULTURE FOR BACTERIA: CPT

## 2017-02-15 PROCEDURE — 80048 BASIC METABOLIC PNL TOTAL CA: CPT

## 2017-02-15 PROCEDURE — 85610 PROTHROMBIN TIME: CPT

## 2017-02-15 PROCEDURE — 71045 X-RAY EXAM CHEST 1 VIEW: CPT

## 2017-02-15 PROCEDURE — 83036 HEMOGLOBIN GLYCOSYLATED A1C: CPT

## 2017-02-15 PROCEDURE — 83690 ASSAY OF LIPASE: CPT

## 2017-02-15 PROCEDURE — 87205 SMEAR GRAM STAIN: CPT

## 2017-02-15 PROCEDURE — 36415 COLL VENOUS BLD VENIPUNCTURE: CPT

## 2017-02-15 PROCEDURE — C1729: CPT

## 2017-02-15 PROCEDURE — 85025 COMPLETE CBC W/AUTO DIFF WBC: CPT

## 2017-02-15 PROCEDURE — 99285 EMERGENCY DEPT VISIT HI MDM: CPT | Mod: 25

## 2017-02-15 PROCEDURE — 81003 URINALYSIS AUTO W/O SCOPE: CPT

## 2017-02-15 PROCEDURE — 74176 CT ABD & PELVIS W/O CONTRAST: CPT

## 2017-02-15 PROCEDURE — 87075 CULTR BACTERIA EXCEPT BLOOD: CPT

## 2017-02-15 PROCEDURE — 86901 BLOOD TYPING SEROLOGIC RH(D): CPT

## 2017-02-15 PROCEDURE — 84100 ASSAY OF PHOSPHORUS: CPT

## 2017-02-15 PROCEDURE — 10030 IMG GID FLU COLL DRG SFT TIS: CPT

## 2017-02-15 RX ORDER — HYDROMORPHONE HYDROCHLORIDE 2 MG/ML
1 INJECTION INTRAMUSCULAR; INTRAVENOUS; SUBCUTANEOUS
Qty: 25 | Refills: 0 | OUTPATIENT
Start: 2017-02-15

## 2017-02-15 RX ORDER — ERTAPENEM SODIUM 1 G/1
1000 INJECTION, POWDER, LYOPHILIZED, FOR SOLUTION INTRAMUSCULAR; INTRAVENOUS
Qty: 0 | Refills: 0 | COMMUNITY
Start: 2017-02-15

## 2017-02-15 RX ADMIN — NYSTATIN CREAM 1 APPLICATION(S): 100000 CREAM TOPICAL at 06:26

## 2017-02-15 RX ADMIN — HEPARIN SODIUM 5000 UNIT(S): 5000 INJECTION INTRAVENOUS; SUBCUTANEOUS at 06:26

## 2017-02-15 RX ADMIN — Medication 100 MILLIGRAM(S): at 06:26

## 2017-02-15 RX ADMIN — NYSTATIN CREAM 1 APPLICATION(S): 100000 CREAM TOPICAL at 12:34

## 2017-02-15 RX ADMIN — ERTAPENEM SODIUM 120 MILLIGRAM(S): 1 INJECTION, POWDER, LYOPHILIZED, FOR SOLUTION INTRAMUSCULAR; INTRAVENOUS at 12:43

## 2017-02-15 RX ADMIN — Medication 650 MILLIGRAM(S): at 00:46

## 2017-02-15 RX ADMIN — Medication 10 MILLIGRAM(S): at 09:44

## 2017-02-15 RX ADMIN — Medication 2: at 12:33

## 2017-02-15 RX ADMIN — HEPARIN SODIUM 5000 UNIT(S): 5000 INJECTION INTRAVENOUS; SUBCUTANEOUS at 14:33

## 2017-02-15 NOTE — PROGRESS NOTE ADULT - ASSESSMENT
36F s/p ventral hernia repair with infected seroma versus abscess, now s/p IR drainage    -Reg diet, CC  -ertapenem  -f/u ID recs   -Home meds and ISS  -DVT ppx  -Pain/nausea control prn  -OOB ambulation  - f/u SW for IV infusions of ertapenam  - f/u duration recs from ID

## 2017-02-15 NOTE — DISCHARGE NOTE ADULT - HOSPITAL COURSE
36F w/ PMHx DM, MO, HLD, smoker, csection x 2, POD 31 ventral hernia repair presents to [facility] with complaints of worsening abdominal pain for the past 4 days, at her incisional site. Admission CT A/P revealed 11.8 x 7.1 x 18.5 cm fluid collection with air-fluid level in the subcutaneous tissue of the anterior pelvic wall consistent with abscess. Pt underwent IR drainage of the abscess. ID was consulted for IV abx management. PICC line was placed for home IV ertapenem. At the time of discharge, pt's vital signs are stable and labs all WNL.

## 2017-02-15 NOTE — DISCHARGE NOTE ADULT - MEDICATION SUMMARY - MEDICATIONS TO TAKE
I will START or STAY ON the medications listed below when I get home from the hospital:    HYDROmorphone 4 mg oral tablet  -- 1 tab(s) by mouth every 4 hours, As needed, Severe Pain (7 - 10) MDD:6  -- Indication: For pain    ertapenem 1 g injection  -- 1000 milligram(s) injectable once a day  -- Indication: For Abdominal abscess

## 2017-02-15 NOTE — DISCHARGE NOTE ADULT - MEDICATION SUMMARY - MEDICATIONS TO STOP TAKING
I will STOP taking the medications listed below when I get home from the hospital:    Cipro 500 mg oral tablet  -- 1 tab(s) by mouth every 12 hours  -- Avoid prolonged or excessive exposure to direct and/or artificial sunlight while taking this medication.  Check with your doctor before becoming pregnant.  Do not take dairy products, antacids, or iron preparations within one hour of this medication.  Finish all this medication unless otherwise directed by prescriber.  Medication should be taken with plenty of water.    bacitracin 500 units/g topical ointment  -- 1 application on skin 2 times a day    ibuprofen 600 mg oral tablet  -- 1 tab(s) by mouth every 8 hours  -- Do not take this drug if you are pregnant.  It is very important that you take or use this exactly as directed.  Do not skip doses or discontinue unless directed by your doctor.  May cause drowsiness or dizziness.  Obtain medical advice before taking any non-prescription drugs as some may affect the action of this medication.  Take with food or milk.

## 2017-02-15 NOTE — PROGRESS NOTE ADULT - SUBJECTIVE AND OBJECTIVE BOX
S:      MEDICATIONS  (STANDING):  heparin  Injectable 5000Unit(s) SubCutaneous every 8 hours  metoprolol 100milliGRAM(s) Oral daily  insulin lispro (HumaLOG) corrective regimen sliding scale  SubCutaneous Before meals and at bedtime  dextrose 5%. 1000milliLiter(s) IV Continuous <Continuous>  dextrose 50% Injectable 12.5Gram(s) IV Push once  nystatin Powder 1Application(s) Topical four times a day  ertapenem  IVPB  IV Intermittent   ertapenem  IVPB 1000milliGRAM(s) IV Intermittent every 24 hours  sodium chloride 0.9% lock flush 20milliLiter(s) IV Push once    MEDICATIONS  (PRN):  ALBUTerol/ipratropium for Nebulization 3milliLiter(s) Nebulizer every 6 hours PRN Shortness of Breath and/or Wheezing  ondansetron Injectable 4milliGRAM(s) IV Push every 6 hours PRN Nausea and/or Vomiting  metoclopramide Injectable 10milliGRAM(s) IV Push every 6 hours PRN nausea  oxyCODONE  5 mG/acetaminophen 325 mG 1Tablet(s) Oral every 4 hours PRN Moderate Pain (4 - 6)  oxyCODONE  5 mG/acetaminophen 325 mG 2Tablet(s) Oral every 4 hours PRN Severe Pain (7 - 10)  dextrose Gel 1Dose(s) Oral once PRN Blood Glucose LESS THAN 70 milliGRAM(s)/deciliter  glucagon  Injectable 1milliGRAM(s) IntraMuscular once PRN Glucose LESS THAN 70 milligrams/deciliter  sodium chloride 0.9% lock flush 10milliLiter(s) IV Push every 1 hour PRN After each medication administration  sodium chloride 0.9% lock flush 10milliLiter(s) IV Push every 12 hours PRN Lumen of catheter NOT used  famotidine    Tablet 20milliGRAM(s) Oral once PRN heartburn      Allergies    amoxicillin (Unknown)  iodine containing compounds (Hives; Anaphylaxis)  penicillin (Hives)  shellfish. (Hives; Anaphylaxis)    Intolerances      Vital Signs Last 24 Hrs  T(C): 37.2, Max: 37.2 (02-15 @ 09:36)  T(F): 99, Max: 99 (02-15 @ 09:36)  HR: 60 (60 - 83)  BP: 131/90 (108/72 - 148/81)  BP(mean): --  RR: 16 (16 - 16)  SpO2: 100% (98% - 100%)    PHYSICAL EXAM:  Constitutional: Well-developed, well-nourished, in NAD  Eyes: normal conjunctivae  ENMT: NCAT, MMM  Neck: supple, no JVD  Respiratory: CTAB  Cardiovascular: RRR, no M/G/R  Gastrointestinal: soft, NTND, no organomegaly  Genitourinary:  Rectal:  Extremities: WWP, no C/C/E  Vascular: 2+ radial and DP pulses B/L  Neurological: grossly intact  Skin: no rash  Lymph Nodes: No cervical LAD  Musculoskeletal: ROM intact  Psychiatric: normal affect    LABS:                        12.0   8.8   )-----------( 430      ( 15 Feb 2017 07:25 )             37.5     15 Feb 2017 07:25    139    |  105    |  9      ----------------------------<  113    3.7     |  25     |  0.75     Ca    8.5        15 Feb 2017 07:25  Phos  3.7       15 Feb 2017 07:25  Mg     2.3       15 Feb 2017 07:25            RADIOLOGY & ADDITIONAL STUDIES: S:  Pt wanting to go home.  Spoke w/ Dr. Corado, who said pt would leave on IV atx and have f/u within 2 weeks.    MEDICATIONS  (STANDING):  heparin  Injectable 5000Unit(s) SubCutaneous every 8 hours  metoprolol 100milliGRAM(s) Oral daily  insulin lispro (HumaLOG) corrective regimen sliding scale  SubCutaneous Before meals and at bedtime  dextrose 5%. 1000milliLiter(s) IV Continuous <Continuous>  dextrose 50% Injectable 12.5Gram(s) IV Push once  nystatin Powder 1Application(s) Topical four times a day  ertapenem  IVPB  IV Intermittent   ertapenem  IVPB 1000milliGRAM(s) IV Intermittent every 24 hours  sodium chloride 0.9% lock flush 20milliLiter(s) IV Push once    MEDICATIONS  (PRN):  ALBUTerol/ipratropium for Nebulization 3milliLiter(s) Nebulizer every 6 hours PRN Shortness of Breath and/or Wheezing  ondansetron Injectable 4milliGRAM(s) IV Push every 6 hours PRN Nausea and/or Vomiting  metoclopramide Injectable 10milliGRAM(s) IV Push every 6 hours PRN nausea  oxyCODONE  5 mG/acetaminophen 325 mG 1Tablet(s) Oral every 4 hours PRN Moderate Pain (4 - 6)  oxyCODONE  5 mG/acetaminophen 325 mG 2Tablet(s) Oral every 4 hours PRN Severe Pain (7 - 10)  dextrose Gel 1Dose(s) Oral once PRN Blood Glucose LESS THAN 70 milliGRAM(s)/deciliter  glucagon  Injectable 1milliGRAM(s) IntraMuscular once PRN Glucose LESS THAN 70 milligrams/deciliter  sodium chloride 0.9% lock flush 10milliLiter(s) IV Push every 1 hour PRN After each medication administration  sodium chloride 0.9% lock flush 10milliLiter(s) IV Push every 12 hours PRN Lumen of catheter NOT used  famotidine    Tablet 20milliGRAM(s) Oral once PRN heartburn      Allergies    amoxicillin (Unknown)  iodine containing compounds (Hives; Anaphylaxis)  penicillin (Hives)  shellfish. (Hives; Anaphylaxis)    Intolerances      Vital Signs Last 24 Hrs  T(C): 37.2, Max: 37.2 (02-15 @ 09:36)  T(F): 99, Max: 99 (02-15 @ 09:36)  HR: 60 (60 - 83)  BP: 131/90 (108/72 - 148/81)  BP(mean): --  RR: 16 (16 - 16)  SpO2: 100% (98% - 100%)    ABBIE drain - 40 cc in 24 hrs    PHYSICAL EXAM:  Constitutional: Well-developed, well-nourished, in NAD  Eyes: normal conjunctivae  ENMT: NCAT, MMM  Neck: supple, no JVD  Respiratory: CTAB  Cardiovascular: RRR, no M/G/R  Gastrointestinal: tender; surgical site clean;  ABBIE drain w/ serosanguinous fluid  Extremities: WWP, no C/C/E  Skin: no rash      LABS:                        12.0   8.8   )-----------( 430      ( 15 Feb 2017 07:25 )             37.5     15 Feb 2017 07:25    139    |  105    |  9      ----------------------------<  113    3.7     |  25     |  0.75     Ca    8.5        15 Feb 2017 07:25  Phos  3.7       15 Feb 2017 07:25  Mg     2.3       15 Feb 2017 07:25      37 yo F PMH smoking, DM2, p/w Superficial SSI s/p repair of ventral hernia - now s/p drainage of large abscess communicating with mesh, w/ culture growing mixed species.  Repeat CT scan showing decrease in abscess collection size; however, mesh is within polymicrobial fluid environment.  - S:  Pt wanting to go home.  Spoke w/ Dr. Corado, who said pt would leave on IV atx and have f/u within 2 weeks.    MEDICATIONS  (STANDING):  heparin  Injectable 5000Unit(s) SubCutaneous every 8 hours  metoprolol 100milliGRAM(s) Oral daily  insulin lispro (HumaLOG) corrective regimen sliding scale  SubCutaneous Before meals and at bedtime  dextrose 5%. 1000milliLiter(s) IV Continuous <Continuous>  dextrose 50% Injectable 12.5Gram(s) IV Push once  nystatin Powder 1Application(s) Topical four times a day  ertapenem  IVPB  IV Intermittent   ertapenem  IVPB 1000milliGRAM(s) IV Intermittent every 24 hours  sodium chloride 0.9% lock flush 20milliLiter(s) IV Push once    MEDICATIONS  (PRN):  ALBUTerol/ipratropium for Nebulization 3milliLiter(s) Nebulizer every 6 hours PRN Shortness of Breath and/or Wheezing  ondansetron Injectable 4milliGRAM(s) IV Push every 6 hours PRN Nausea and/or Vomiting  metoclopramide Injectable 10milliGRAM(s) IV Push every 6 hours PRN nausea  oxyCODONE  5 mG/acetaminophen 325 mG 1Tablet(s) Oral every 4 hours PRN Moderate Pain (4 - 6)  oxyCODONE  5 mG/acetaminophen 325 mG 2Tablet(s) Oral every 4 hours PRN Severe Pain (7 - 10)  dextrose Gel 1Dose(s) Oral once PRN Blood Glucose LESS THAN 70 milliGRAM(s)/deciliter  glucagon  Injectable 1milliGRAM(s) IntraMuscular once PRN Glucose LESS THAN 70 milligrams/deciliter  sodium chloride 0.9% lock flush 10milliLiter(s) IV Push every 1 hour PRN After each medication administration  sodium chloride 0.9% lock flush 10milliLiter(s) IV Push every 12 hours PRN Lumen of catheter NOT used  famotidine    Tablet 20milliGRAM(s) Oral once PRN heartburn      Allergies    amoxicillin (Unknown)  iodine containing compounds (Hives; Anaphylaxis)  penicillin (Hives)  shellfish. (Hives; Anaphylaxis)    Intolerances      Vital Signs Last 24 Hrs  T(C): 37.2, Max: 37.2 (02-15 @ 09:36)  T(F): 99, Max: 99 (02-15 @ 09:36)  HR: 60 (60 - 83)  BP: 131/90 (108/72 - 148/81)  BP(mean): --  RR: 16 (16 - 16)  SpO2: 100% (98% - 100%)    ABBIE drain - 40 cc in 24 hrs    PHYSICAL EXAM:  Constitutional: Well-developed, well-nourished, in NAD  Eyes: normal conjunctivae  ENMT: NCAT, MMM  Neck: supple, no JVD  Respiratory: CTAB  Cardiovascular: RRR, no M/G/R  Gastrointestinal: tender; surgical site clean;  ABBIE drain w/ serosanguinous fluid  Extremities: WWP, no C/C/E  Skin: no rash      LABS:                        12.0   8.8   )-----------( 430      ( 15 Feb 2017 07:25 )             37.5     15 Feb 2017 07:25    139    |  105    |  9      ----------------------------<  113    3.7     |  25     |  0.75     Ca    8.5        15 Feb 2017 07:25  Phos  3.7       15 Feb 2017 07:25  Mg     2.3       15 Feb 2017 07:25      35 yo F PMH smoking, DM2, p/w Superficial SSI s/p repair of ventral hernia w/ ABBIE drain in place.   - pt tolerating ertapenem well without allergic rxn.    -would continue ertapenem pending plans of the primary team  -we will continue to follow

## 2017-02-15 NOTE — DISCHARGE NOTE ADULT - CARE PROVIDER_API CALL
Hema Hurtado), Surgery  [address] [place] [address]  Phone: [phone]  Fax: [phone]

## 2017-02-15 NOTE — PROGRESS NOTE ADULT - SUBJECTIVE AND OBJECTIVE BOX
O/N: FAUSTINO.  2/14: Home infusions set up, starting 2/15. Pending recs from ID for ertapenam duration, via PICC. ID recs: remove mesh, Yasmin Hurtado.     STATUS POST:  ventral hernia repair, re-admitted with infected seroma s/p IR drainage O/N: FAUSTINO.  2/14: Home infusions set up, starting 2/15. Pending recs from ID for ertapenam duration, via PICC. ID recs: remove mesh, Yasmin called [facility].     STATUS POST:  ventral hernia repair, re-admitted with infected seroma s/p IR drainage    SUBJECTIVE:  Flatus: [x ] YES [ ] NO             Bowel Movement: [ x] YES [ ] NO  Pain (0-10):            Pain Control Adequate: [ x] YES [ ] NO  Nausea: [ ] YES [x ] NO            Vomiting: [ ] YES [x ] NO  Diarrhea: [ ] YES [x ] NO         Constipation: [ ] YES [x ] NO     Chest Pain: [ ] YES [x ] NO    SOB:  [ ] YES [x ] NO    MEDICATIONS  (STANDING):  heparin  Injectable 5000Unit(s) SubCutaneous every 8 hours  metoprolol 100milliGRAM(s) Oral daily  insulin lispro (HumaLOG) corrective regimen sliding scale  SubCutaneous Before meals and at bedtime  dextrose 5%. 1000milliLiter(s) IV Continuous <Continuous>  dextrose 50% Injectable 12.5Gram(s) IV Push once  nystatin Powder 1Application(s) Topical four times a day  ertapenem  IVPB  IV Intermittent   ertapenem  IVPB 1000milliGRAM(s) IV Intermittent every 24 hours  sodium chloride 0.9% lock flush 20milliLiter(s) IV Push once    MEDICATIONS  (PRN):  ALBUTerol/ipratropium for Nebulization 3milliLiter(s) Nebulizer every 6 hours PRN Shortness of Breath and/or Wheezing  ondansetron Injectable 4milliGRAM(s) IV Push every 6 hours PRN Nausea and/or Vomiting  metoclopramide Injectable 10milliGRAM(s) IV Push every 6 hours PRN nausea  oxyCODONE  5 mG/acetaminophen 325 mG 1Tablet(s) Oral every 4 hours PRN Moderate Pain (4 - 6)  oxyCODONE  5 mG/acetaminophen 325 mG 2Tablet(s) Oral every 4 hours PRN Severe Pain (7 - 10)  dextrose Gel 1Dose(s) Oral once PRN Blood Glucose LESS THAN 70 milliGRAM(s)/deciliter  glucagon  Injectable 1milliGRAM(s) IntraMuscular once PRN Glucose LESS THAN 70 milligrams/deciliter  sodium chloride 0.9% lock flush 10milliLiter(s) IV Push every 1 hour PRN After each medication administration  sodium chloride 0.9% lock flush 10milliLiter(s) IV Push every 12 hours PRN Lumen of catheter NOT used  famotidine    Tablet 20milliGRAM(s) Oral once PRN heartburn      Vital Signs Last 24 Hrs  T(C): 36.4, Max: 37 (02-14 @ 20:20)  T(F): 97.6, Max: 98.6 (02-14 @ 20:20)  HR: 60 (60 - 83)  BP: 137/79 (108/72 - 148/81)  BP(mean): --  RR: 16 (16 - 16)  SpO2: 98% (98% - 100%)    PHYSICAL EXAM:      Constitutional: A&Ox3    Respiratory: non labored breathing, no respiratory distress    Cardiovascular: NSR, RRR    Gastrointestinal: soft, non distended, no TTP                 Incision: CDI, ABBIE drain- s/s    Genitourinary: voiding     Extremities: (-) edema          I&O's Detail    I & Os for current day (as of 15 Feb 2017 08:18)  =============================================  IN:    Oral Fluid: 840 ml    Total IN: 840 ml  ---------------------------------------------  OUT:    Voided: 1300 ml    Bulb: 40 ml    Total OUT: 1340 ml  ---------------------------------------------  Total NET: -500 ml      LABS:                        12.0   8.8   )-----------( 430      ( 15 Feb 2017 07:25 )             37.5     15 Feb 2017 07:25    139    |  105    |  9      ----------------------------<  113    3.7     |  25     |  0.75     Ca    8.5        15 Feb 2017 07:25  Phos  3.7       15 Feb 2017 07:25  Mg     2.3       15 Feb 2017 07:25            RADIOLOGY & ADDITIONAL STUDIES:

## 2017-02-15 NOTE — DISCHARGE NOTE ADULT - PLAN OF CARE
pain control Continue regular diet and activity as tolerated.  Record ABBIE output daily.   You will be receiving home nursing care for IV antibiotics.   Follow up with Dr. Hurtado in 1 week. Call the office to schedule an appointment.  Return to the ED for any worsening abdominal pain, fever>101F/chills, nausea/vomiting, shortness of breath, or chest pain.

## 2017-02-15 NOTE — DISCHARGE NOTE ADULT - CARE PLAN
Principal Discharge DX:	Abscess of abdominal wall  Goal:	pain control  Instructions for follow-up, activity and diet:	Continue regular diet and activity as tolerated.  Record ABBIE output daily.   You will be receiving home nursing care for IV antibiotics.   Follow up with Dr. Hurtado in 1 week. Call the office to schedule an appointment.  Return to the ED for any worsening abdominal pain, fever>101F/chills, nausea/vomiting, shortness of breath, or chest pain.

## 2017-02-15 NOTE — PROGRESS NOTE ADULT - PROVIDER SPECIALTY LIST ADULT
Infectious Disease
Intervent Radiology
Surgery

## 2017-02-15 NOTE — DISCHARGE NOTE ADULT - PATIENT PORTAL LINK FT
“You can access the [facility] Patient Portal, offered by [facility], by registering with the following website: [email or web address]”

## 2017-02-19 DIAGNOSIS — L02.211 CUTANEOUS ABSCESS OF ABDOMINAL WALL: ICD-10-CM

## 2017-02-19 DIAGNOSIS — Z88.0 ALLERGY STATUS TO PENICILLIN: ICD-10-CM

## 2017-02-19 DIAGNOSIS — T81.4XXA INFECTION FOLLOWING A PROCEDURE, INITIAL ENCOUNTER: ICD-10-CM

## 2017-02-19 DIAGNOSIS — Z88.1 ALLERGY STATUS TO OTHER ANTIBIOTIC AGENTS STATUS: ICD-10-CM

## 2017-02-19 DIAGNOSIS — Z91.013 ALLERGY TO SEAFOOD: ICD-10-CM
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
## 2017-02-19 DIAGNOSIS — F17.200 NICOTINE DEPENDENCE, UNSPECIFIED, UNCOMPLICATED: ICD-10-CM

## 2017-02-19 DIAGNOSIS — R10.9 UNSPECIFIED ABDOMINAL PAIN: ICD-10-CM

## 2017-02-19 DIAGNOSIS — E66.01 MORBID (SEVERE) OBESITY DUE TO EXCESS CALORIES: ICD-10-CM

## 2017-02-19 DIAGNOSIS — E11.9 TYPE 2 DIABETES MELLITUS WITHOUT COMPLICATIONS: ICD-10-CM

## 2017-02-19 DIAGNOSIS — E78.5 HYPERLIPIDEMIA, UNSPECIFIED: ICD-10-CM

## 2017-03-16 ENCOUNTER — INPATIENT (INPATIENT)
Facility: HOSPITAL | Age: 37
LOS: 0 days | Discharge: LEFT BEFORE DISCHARGE | DRG: 392 | End: 2017-03-17
Attending: SURGERY | Admitting: SURGERY
Payer: COMMERCIAL

## 2017-03-16 VITALS
WEIGHT: 248.46 LBS | HEART RATE: 116 BPM | TEMPERATURE: 99 F | SYSTOLIC BLOOD PRESSURE: 154 MMHG | OXYGEN SATURATION: 100 % | RESPIRATION RATE: 18 BRPM | DIASTOLIC BLOOD PRESSURE: 96 MMHG

## 2017-03-16 DIAGNOSIS — Z98.89 OTHER SPECIFIED POSTPROCEDURAL STATES: Chronic | ICD-10-CM

## 2017-03-16 DIAGNOSIS — Z98.890 OTHER SPECIFIED POSTPROCEDURAL STATES: Chronic | ICD-10-CM

## 2017-03-16 LAB
APPEARANCE UR: CLEAR — SIGNIFICANT CHANGE UP
BILIRUB UR-MCNC: (no result)
COLOR SPEC: YELLOW — SIGNIFICANT CHANGE UP
DIFF PNL FLD: NEGATIVE — SIGNIFICANT CHANGE UP
GLUCOSE UR QL: NEGATIVE — SIGNIFICANT CHANGE UP
KETONES UR-MCNC: 15 MG/DL
LEUKOCYTE ESTERASE UR-ACNC: NEGATIVE — SIGNIFICANT CHANGE UP
NITRITE UR-MCNC: NEGATIVE — SIGNIFICANT CHANGE UP
PH UR: 5.5 — SIGNIFICANT CHANGE UP (ref 4–8)
PROT UR-MCNC: NEGATIVE MG/DL — SIGNIFICANT CHANGE UP
SP GR SPEC: >=1.03 — SIGNIFICANT CHANGE UP (ref 1–1.03)
UROBILINOGEN FLD QL: 0.2 E.U./DL — SIGNIFICANT CHANGE UP

## 2017-03-16 PROCEDURE — 74176 CT ABD & PELVIS W/O CONTRAST: CPT | Mod: 26

## 2017-03-16 RX ORDER — MORPHINE SULFATE 50 MG/1
4 CAPSULE, EXTENDED RELEASE ORAL ONCE
Qty: 0 | Refills: 0 | Status: DISCONTINUED | OUTPATIENT
Start: 2017-03-16 | End: 2017-03-16

## 2017-03-16 RX ADMIN — MORPHINE SULFATE 4 MILLIGRAM(S): 50 CAPSULE, EXTENDED RELEASE ORAL at 23:44

## 2017-03-16 NOTE — ED ADULT TRIAGE NOTE - CHIEF COMPLAINT QUOTE
s/p intestinal surgery a month and half ago with mona drain accidentally removed by the pt about 2 hours ago, now pt having abdominal pain and headache

## 2017-03-16 NOTE — ED ADULT NURSE NOTE - OBJECTIVE STATEMENT
Pt CO Abd Pain 10/10, specific to J Peg site, s/p removal this afternoon.  Pt states "I was suppose to have a F/U appt today for a CT, but I missed this appt, and the tube got dislodged and now I'm in severe pain and have a Migraine."  Pt denies N/V/D, SOB, Fevers at this time.

## 2017-03-16 NOTE — ED ADULT NURSE NOTE - NS ED NURSE DISCH DISPOSITION
Admitted AMA (saw a physician/midlevel provider and clinician was able to provide reasons for staying for treatment & form is signed)

## 2017-03-17 VITALS
SYSTOLIC BLOOD PRESSURE: 144 MMHG | HEART RATE: 92 BPM | OXYGEN SATURATION: 99 % | RESPIRATION RATE: 16 BRPM | TEMPERATURE: 98 F | DIASTOLIC BLOOD PRESSURE: 86 MMHG

## 2017-03-17 DIAGNOSIS — R10.9 UNSPECIFIED ABDOMINAL PAIN: ICD-10-CM

## 2017-03-17 PROCEDURE — 99285 EMERGENCY DEPT VISIT HI MDM: CPT

## 2017-03-17 RX ORDER — ERTAPENEM SODIUM 1 G/1
1000 INJECTION, POWDER, LYOPHILIZED, FOR SOLUTION INTRAMUSCULAR; INTRAVENOUS ONCE
Qty: 0 | Refills: 0 | Status: COMPLETED | OUTPATIENT
Start: 2017-03-17 | End: 2017-03-17

## 2017-03-17 RX ORDER — SODIUM CHLORIDE 9 MG/ML
1000 INJECTION, SOLUTION INTRAVENOUS
Qty: 0 | Refills: 0 | Status: DISCONTINUED | OUTPATIENT
Start: 2017-03-17 | End: 2017-03-17

## 2017-03-17 RX ORDER — ACETAMINOPHEN 500 MG
650 TABLET ORAL EVERY 6 HOURS
Qty: 0 | Refills: 0 | Status: DISCONTINUED | OUTPATIENT
Start: 2017-03-17 | End: 2017-03-17

## 2017-03-17 RX ORDER — ONDANSETRON 8 MG/1
4 TABLET, FILM COATED ORAL EVERY 6 HOURS
Qty: 0 | Refills: 0 | Status: DISCONTINUED | OUTPATIENT
Start: 2017-03-17 | End: 2017-03-17

## 2017-03-17 RX ORDER — HEPARIN SODIUM 5000 [USP'U]/ML
5000 INJECTION INTRAVENOUS; SUBCUTANEOUS EVERY 8 HOURS
Qty: 0 | Refills: 0 | Status: DISCONTINUED | OUTPATIENT
Start: 2017-03-17 | End: 2017-03-17

## 2017-03-17 RX ORDER — SODIUM CHLORIDE 9 MG/ML
1000 INJECTION INTRAMUSCULAR; INTRAVENOUS; SUBCUTANEOUS
Qty: 0 | Refills: 0 | Status: DISCONTINUED | OUTPATIENT
Start: 2017-03-17 | End: 2017-03-17

## 2017-03-17 RX ADMIN — Medication 650 MILLIGRAM(S): at 03:14

## 2017-03-17 RX ADMIN — ERTAPENEM SODIUM 120 MILLIGRAM(S): 1 INJECTION, POWDER, LYOPHILIZED, FOR SOLUTION INTRAMUSCULAR; INTRAVENOUS at 03:08

## 2017-03-17 NOTE — H&P ADULT - PROBLEM SELECTOR PLAN 1
-Admit to surgery for observation and IV abx  -NPO/IVF, for possible IR drain in AM  -Pain/Nausea control  -IV Ertapenem (as pt was on Ertapenem per ID on last hospital stay)  -D/W Surgery Chief on Call

## 2017-03-17 NOTE — ED PROVIDER NOTE - OBJECTIVE STATEMENT
37 yof sp ventral hernia repair w/ abscess, pw lower abd pain, nonradiating, also noted surgical drain fell out today.  intermittent dysuria.  no fever.

## 2017-03-17 NOTE — H&P ADULT - ASSESSMENT
37F s/p repair of incisional hernia 1/8/2017 c/b abdominal wall abscess s/p IR drain placement 2/7/2017 for which she went home on IV Abx, now here s/p self removal of the drain. CT with minimal remaining fluid collection, however final read pending. AFVSS. However pt with WBC count.

## 2017-03-17 NOTE — H&P ADULT - HISTORY OF PRESENT ILLNESS
37F hx MO, DM, HL, HTN,  x2, repair of incisional hernia 2017 c/b abdominal wall abscess s/p IR drain placement 2017 for which she went home on IV Abx, now here s/p self removal of the drain. Pt reports that she was putting on some pants, when the drain fell out. Of note, she has noted mild abdominal pain the past 4 days, no f/c/v. Some nausea but only when she gets migraines. +BM/+Flatus. Otherwise no complaints. She reports no drainage from drain site. Of note, pt was supposed to see Dr. Hurtado in the office today but reports that she was unable to go. As a result, she came to the ED.

## 2017-03-17 NOTE — CHART NOTE - NSCHARTNOTEFT_GEN_A_CORE
Pt was admitted for observation and IV abx. She later stated that she had to leave to take care of her children. She was explained the reason for her admission, the plan for her hospital course and the risks of leaving against medical advice. She stated that she understood the reasons for admission and the risk of leaving and left AMA.

## 2017-03-17 NOTE — H&P ADULT - NSHPLABSRESULTS_GEN_ALL_CORE
16 Mar 2017 21:53    138    |  102    |  7      ----------------------------<  158    3.3     |  29     |  0.79     Ca    9.1        16 Mar 2017 21:53    TPro  8.7    /  Alb  3.7    /  TBili  0.3    /  DBili  x      /  AST  20     /  ALT  25     /  AlkPhos  154    16 Mar 2017 21:53                          13.7   13.6  )-----------( 503      ( 16 Mar 2017 21:53 )             42.6   PT/INR - ( 16 Mar 2017 21:53 )   PT: 13.0 sec;   INR: 1.17          PTT - ( 16 Mar 2017 21:53 )  PTT:34.2 sec

## 2017-03-17 NOTE — H&P ADULT - NSHPPHYSICALEXAM_GEN_ALL_CORE
Gen: NAD  CV: RRR  Pulm: CTAB  Abd: Soft, ND. When distracted, pt NT. Drain site w/ minimal surrounding erythema, No induration or fluctuance noted. No drainage from area

## 2017-03-17 NOTE — ED PROVIDER NOTE - PHYSICAL EXAMINATION
CON: ao x 3, HENMT: clear oropharynx, soft neck, HEAD: atraumatic, CV: rrr, equal pulses b/l, RESP: cta b/l, GI: +BS, soft, tender lower abd, surgical drain site no surrounding erythema, no bleeding, no active drainage, no rebound, no guarding, SKIN: no rash, MSK: moving all extremities spontaneously, NEURO: nonfocal

## 2017-03-18 LAB
CULTURE RESULTS: SIGNIFICANT CHANGE UP
SPECIMEN SOURCE: SIGNIFICANT CHANGE UP

## 2017-03-24 DIAGNOSIS — R10.9 UNSPECIFIED ABDOMINAL PAIN: ICD-10-CM

## 2017-03-24 DIAGNOSIS — E66.9 OBESITY, UNSPECIFIED: ICD-10-CM

## 2017-03-24 DIAGNOSIS — E78.5 HYPERLIPIDEMIA, UNSPECIFIED: ICD-10-CM

## 2017-03-24 DIAGNOSIS — I10 ESSENTIAL (PRIMARY) HYPERTENSION: ICD-10-CM

## 2017-03-24 DIAGNOSIS — Z91.013 ALLERGY TO SEAFOOD: ICD-10-CM

## 2017-03-24 DIAGNOSIS — L02.211 CUTANEOUS ABSCESS OF ABDOMINAL WALL: ICD-10-CM

## 2017-03-24 DIAGNOSIS — Z88.0 ALLERGY STATUS TO PENICILLIN: ICD-10-CM

## 2017-03-24 DIAGNOSIS — E11.9 TYPE 2 DIABETES MELLITUS WITHOUT COMPLICATIONS: ICD-10-CM

## 2017-03-24 DIAGNOSIS — K46.9 UNSPECIFIED ABDOMINAL HERNIA WITHOUT OBSTRUCTION OR GANGRENE: ICD-10-CM

## 2017-09-02 ENCOUNTER — INPATIENT (INPATIENT)
Facility: HOSPITAL | Age: 37
LOS: 0 days | Discharge: HOME | DRG: 690 | End: 2017-09-03
Attending: STUDENT IN AN ORGANIZED HEALTH CARE EDUCATION/TRAINING PROGRAM | Admitting: STUDENT IN AN ORGANIZED HEALTH CARE EDUCATION/TRAINING PROGRAM
Payer: COMMERCIAL

## 2017-09-02 VITALS
DIASTOLIC BLOOD PRESSURE: 90 MMHG | OXYGEN SATURATION: 99 % | HEIGHT: 67 IN | WEIGHT: 293 LBS | SYSTOLIC BLOOD PRESSURE: 160 MMHG | RESPIRATION RATE: 16 BRPM | HEART RATE: 96 BPM | TEMPERATURE: 98 F

## 2017-09-02 DIAGNOSIS — Z98.890 OTHER SPECIFIED POSTPROCEDURAL STATES: Chronic | ICD-10-CM

## 2017-09-02 DIAGNOSIS — E66.01 MORBID (SEVERE) OBESITY DUE TO EXCESS CALORIES: ICD-10-CM

## 2017-09-02 DIAGNOSIS — Z98.89 OTHER SPECIFIED POSTPROCEDURAL STATES: Chronic | ICD-10-CM

## 2017-09-02 LAB
EXTRA BLUE TOP TUBE: SIGNIFICANT CHANGE UP
EXTRA SST TUBE: SIGNIFICANT CHANGE UP

## 2017-09-02 PROCEDURE — 99285 EMERGENCY DEPT VISIT HI MDM: CPT | Mod: 25

## 2017-09-02 RX ORDER — MORPHINE SULFATE 50 MG/1
4 CAPSULE, EXTENDED RELEASE ORAL ONCE
Qty: 0 | Refills: 0 | Status: DISCONTINUED | OUTPATIENT
Start: 2017-09-02 | End: 2017-09-02

## 2017-09-02 RX ORDER — SODIUM CHLORIDE 9 MG/ML
1000 INJECTION INTRAMUSCULAR; INTRAVENOUS; SUBCUTANEOUS
Qty: 0 | Refills: 0 | Status: DISCONTINUED | OUTPATIENT
Start: 2017-09-02 | End: 2017-09-03

## 2017-09-02 RX ORDER — IOHEXOL 300 MG/ML
50 INJECTION, SOLUTION INTRAVENOUS ONCE
Qty: 0 | Refills: 0 | Status: COMPLETED | OUTPATIENT
Start: 2017-09-02 | End: 2017-09-03

## 2017-09-02 NOTE — ED PROVIDER NOTE - PROGRESS NOTE DETAILS
Pt discussed w surgery.  Will follow and await ct. Pt discussed w surgery.  They state they will get involved if ct w surgical issue. Pt reports lmp 1 mo ago, currently menstruating; ordered for ct.  UCG inconclusive so ct put on hold until hcg returned.  HCG indeterminate.  Pt reports + condom use but potential for pregnancy.  CT canceled.  Plan gyn exam and will get u/s for ? ectoptic.  Labs w elevated wbc, + uti, nl chem. US neg.  GYN consulted and will eval but await MRI.  MRI ordered to further eval poss intra-abd process related to pt's prior issues. Pt signed out to Dr Younger and RACHELE Barraza pending mri.  Pt resting comfortably. Pt reports lmp now and 1 mo ago, currently menstruating; ordered for ct.  UCG inconclusive so ct put on hold until hcg returned.  HCG indeterminate.  Pt reports + condom use but potential for pregnancy.  CT canceled.  Plan gyn exam and will get u/s for ? ectoptic.  Labs w elevated wbc, + uti, nl chem. US neg.  GYN consulted and will eval but await MRI.  MRI ordered to further eval poss intra-abd process related to pt's hernia/abscess  issues. Pt seen by surgery who reviewed the MRI images with Dr. Hurtado, showing similar fluid collection from previous CT after drainage. Given lack of fever and vague migratory pain they do not think that this fluid collection is her primary issue. Recommend discharge with gyn f/u and f/u with Dr. Hurtado next week

## 2017-09-02 NOTE — ED PROVIDER NOTE - MEDICAL DECISION MAKING DETAILS
Pt c/o abd pain x 3 d similar to when she had issues complicating her hernia repair.  Pt w ttp mult locations, afebrile, no erythema or external swelling to suggest superficial abscess, no sig sbo sx.  Plan labs, pain meds, ct, surg consult.

## 2017-09-02 NOTE — ED ADULT NURSE NOTE - CHPI ED SYMPTOMS NEG
no fever/no vomiting/no hematuria/no dysuria/no blood in stool/no chills/no diarrhea/no nausea/no burning urination

## 2017-09-02 NOTE — ED ADULT NURSE NOTE - OBJECTIVE STATEMENT
Pt w/ PMH of DM and hx of complications from abdominal sx d/t hernia repair presents to ED today c/o 9/10 bilateral suprapubic pain and increased abdominal swelling x3 days.  Pt states she has tx'd w/ PO 800mg of ibuprofen w/o relief.  Pt states she feels like "a ripping sensation" when she tries to move her bowels or make urine.  Pt denies dysuria or changes to bowel habits.  Pt denies N/V, fever, chills or dizziness.  Pt c/o new boils to inside of mouth and under left armpit, w/o known etiology.  Pt is pending lab results.

## 2017-09-02 NOTE — ED PROVIDER NOTE - CONSTITUTIONAL, MLM
normal... Well appearing, obese, awake, alert, oriented to person, place, time/situation and in painful distress.

## 2017-09-02 NOTE — ED PROVIDER NOTE - OBJECTIVE STATEMENT
38 yo female h/o obesity, dm, hld, htn, ventral hernia s/p repair complicated by abscess s/p ir and mona drainage c/o severe, jagged saw like pain lower abd along where the mona drain used to be associated w n/v.  + abd distension, + bm/flatus.  No home meds for pain.  Sx x 3 d, now severe. No fever.  + dysuria, no flank pain.  Pt states currently menstruating, lmp 1 mo ago, no abnl discharge.  No h/o ovarian cyst.  Monogamous w condoms, no h/o std.

## 2017-09-03 VITALS
HEART RATE: 67 BPM | RESPIRATION RATE: 17 BRPM | DIASTOLIC BLOOD PRESSURE: 93 MMHG | SYSTOLIC BLOOD PRESSURE: 137 MMHG | TEMPERATURE: 98 F | OXYGEN SATURATION: 98 %

## 2017-09-03 LAB
ALBUMIN SERPL ELPH-MCNC: 3.7 G/DL — SIGNIFICANT CHANGE UP (ref 3.3–5)
ALP SERPL-CCNC: 117 U/L — SIGNIFICANT CHANGE UP (ref 40–120)
ALT FLD-CCNC: 13 U/L — SIGNIFICANT CHANGE UP (ref 10–45)
ANION GAP SERPL CALC-SCNC: 14 MMOL/L — SIGNIFICANT CHANGE UP (ref 5–17)
APPEARANCE UR: (no result)
AST SERPL-CCNC: 15 U/L — SIGNIFICANT CHANGE UP (ref 10–40)
BACTERIA # UR AUTO: (no result) /HPF
BASOPHILS NFR BLD AUTO: 0.2 % — SIGNIFICANT CHANGE UP (ref 0–2)
BILIRUB SERPL-MCNC: <0.2 MG/DL — SIGNIFICANT CHANGE UP (ref 0.2–1.2)
BILIRUB UR-MCNC: (no result)
BUN SERPL-MCNC: 10 MG/DL — SIGNIFICANT CHANGE UP (ref 7–23)
CALCIUM SERPL-MCNC: 9 MG/DL — SIGNIFICANT CHANGE UP (ref 8.4–10.5)
CHLORIDE SERPL-SCNC: 102 MMOL/L — SIGNIFICANT CHANGE UP (ref 96–108)
CO2 SERPL-SCNC: 24 MMOL/L — SIGNIFICANT CHANGE UP (ref 22–31)
COLOR SPEC: (no result)
CREAT SERPL-MCNC: 0.8 MG/DL — SIGNIFICANT CHANGE UP (ref 0.5–1.3)
DIFF PNL FLD: (no result)
EOSINOPHIL NFR BLD AUTO: 1.5 % — SIGNIFICANT CHANGE UP (ref 0–6)
EPI CELLS # UR: SIGNIFICANT CHANGE UP /HPF
GLUCOSE SERPL-MCNC: 166 MG/DL — HIGH (ref 70–99)
GLUCOSE UR QL: NEGATIVE — SIGNIFICANT CHANGE UP
HCG UR QL: SIGNIFICANT CHANGE UP
HCT VFR BLD CALC: 38.9 % — SIGNIFICANT CHANGE UP (ref 34.5–45)
HGB BLD-MCNC: 12.6 G/DL — SIGNIFICANT CHANGE UP (ref 11.5–15.5)
KETONES UR-MCNC: (no result) MG/DL
LEUKOCYTE ESTERASE UR-ACNC: (no result)
LIDOCAIN IGE QN: 27 U/L — SIGNIFICANT CHANGE UP (ref 7–60)
LYMPHOCYTES # BLD AUTO: 32.9 % — SIGNIFICANT CHANGE UP (ref 13–44)
MCHC RBC-ENTMCNC: 25.4 PG — LOW (ref 27–34)
MCHC RBC-ENTMCNC: 32.4 G/DL — SIGNIFICANT CHANGE UP (ref 32–36)
MCV RBC AUTO: 78.3 FL — LOW (ref 80–100)
MONOCYTES NFR BLD AUTO: 6 % — SIGNIFICANT CHANGE UP (ref 2–14)
NEUTROPHILS NFR BLD AUTO: 59.4 % — SIGNIFICANT CHANGE UP (ref 43–77)
NITRITE UR-MCNC: POSITIVE
PH UR: 6.5 — SIGNIFICANT CHANGE UP (ref 5–8)
PLATELET # BLD AUTO: 420 K/UL — HIGH (ref 150–400)
POTASSIUM SERPL-MCNC: 3.7 MMOL/L — SIGNIFICANT CHANGE UP (ref 3.5–5.3)
POTASSIUM SERPL-SCNC: 3.7 MMOL/L — SIGNIFICANT CHANGE UP (ref 3.5–5.3)
PROT SERPL-MCNC: 7.5 G/DL — SIGNIFICANT CHANGE UP (ref 6–8.3)
PROT UR-MCNC: 100 MG/DL
RBC # BLD: 4.97 M/UL — SIGNIFICANT CHANGE UP (ref 3.8–5.2)
RBC # FLD: 16.7 % — SIGNIFICANT CHANGE UP (ref 10.3–16.9)
RBC CASTS # UR COMP ASSIST: (no result) /HPF
SODIUM SERPL-SCNC: 140 MMOL/L — SIGNIFICANT CHANGE UP (ref 135–145)
SP GR SPEC: 1.02 — SIGNIFICANT CHANGE UP (ref 1–1.03)
UROBILINOGEN FLD QL: 1 E.U./DL — SIGNIFICANT CHANGE UP
WBC # BLD: 12 K/UL — HIGH (ref 3.8–10.5)
WBC # FLD AUTO: 12 K/UL — HIGH (ref 3.8–10.5)
WBC UR QL: > 10 /HPF

## 2017-09-03 PROCEDURE — 74181 MRI ABDOMEN W/O CONTRAST: CPT | Mod: 26

## 2017-09-03 PROCEDURE — 72195 MRI PELVIS W/O DYE: CPT | Mod: 26

## 2017-09-03 PROCEDURE — 76815 OB US LIMITED FETUS(S): CPT | Mod: 26

## 2017-09-03 RX ORDER — NITROFURANTOIN MACROCRYSTAL 50 MG
1 CAPSULE ORAL
Qty: 10 | Refills: 0 | OUTPATIENT
Start: 2017-09-03 | End: 2017-09-08

## 2017-09-03 RX ORDER — MORPHINE SULFATE 50 MG/1
4 CAPSULE, EXTENDED RELEASE ORAL ONCE
Qty: 0 | Refills: 0 | Status: DISCONTINUED | OUTPATIENT
Start: 2017-09-03 | End: 2017-09-03

## 2017-09-03 RX ORDER — ONDANSETRON 8 MG/1
4 TABLET, FILM COATED ORAL ONCE
Qty: 0 | Refills: 0 | Status: COMPLETED | OUTPATIENT
Start: 2017-09-03 | End: 2017-09-03

## 2017-09-03 RX ORDER — ONDANSETRON 8 MG/1
1 TABLET, FILM COATED ORAL
Qty: 9 | Refills: 0 | OUTPATIENT
Start: 2017-09-03 | End: 2017-09-06

## 2017-09-03 RX ORDER — HYDROMORPHONE HYDROCHLORIDE 2 MG/ML
1 INJECTION INTRAMUSCULAR; INTRAVENOUS; SUBCUTANEOUS ONCE
Qty: 0 | Refills: 0 | Status: DISCONTINUED | OUTPATIENT
Start: 2017-09-03 | End: 2017-09-03

## 2017-09-03 RX ORDER — NITROFURANTOIN MACROCRYSTAL 50 MG
100 CAPSULE ORAL ONCE
Qty: 0 | Refills: 0 | Status: COMPLETED | OUTPATIENT
Start: 2017-09-03 | End: 2017-09-03

## 2017-09-03 RX ADMIN — ONDANSETRON 4 MILLIGRAM(S): 8 TABLET, FILM COATED ORAL at 00:24

## 2017-09-03 RX ADMIN — HYDROMORPHONE HYDROCHLORIDE 1 MILLIGRAM(S): 2 INJECTION INTRAMUSCULAR; INTRAVENOUS; SUBCUTANEOUS at 13:46

## 2017-09-03 RX ADMIN — MORPHINE SULFATE 4 MILLIGRAM(S): 50 CAPSULE, EXTENDED RELEASE ORAL at 03:00

## 2017-09-03 RX ADMIN — MORPHINE SULFATE 4 MILLIGRAM(S): 50 CAPSULE, EXTENDED RELEASE ORAL at 09:35

## 2017-09-03 RX ADMIN — IOHEXOL 50 MILLILITER(S): 300 INJECTION, SOLUTION INTRAVENOUS at 00:25

## 2017-09-03 RX ADMIN — ONDANSETRON 4 MILLIGRAM(S): 8 TABLET, FILM COATED ORAL at 03:30

## 2017-09-03 RX ADMIN — SODIUM CHLORIDE 125 MILLILITER(S): 9 INJECTION INTRAMUSCULAR; INTRAVENOUS; SUBCUTANEOUS at 00:20

## 2017-09-03 RX ADMIN — Medication 100 MILLIGRAM(S): at 03:30

## 2017-09-03 RX ADMIN — MORPHINE SULFATE 4 MILLIGRAM(S): 50 CAPSULE, EXTENDED RELEASE ORAL at 00:25

## 2017-09-03 RX ADMIN — MORPHINE SULFATE 4 MILLIGRAM(S): 50 CAPSULE, EXTENDED RELEASE ORAL at 09:07

## 2017-09-03 RX ADMIN — MORPHINE SULFATE 4 MILLIGRAM(S): 50 CAPSULE, EXTENDED RELEASE ORAL at 03:30

## 2017-09-03 RX ADMIN — MORPHINE SULFATE 4 MILLIGRAM(S): 50 CAPSULE, EXTENDED RELEASE ORAL at 08:21

## 2017-09-03 RX ADMIN — HYDROMORPHONE HYDROCHLORIDE 1 MILLIGRAM(S): 2 INJECTION INTRAMUSCULAR; INTRAVENOUS; SUBCUTANEOUS at 12:25

## 2017-09-03 NOTE — CONSULT NOTE ADULT - ASSESSMENT
36 yo F w/ abdominal pain 6 months after ventral hernia repair, found to have slight elevation in HCG.  -f/u MRI results  -serial hcg  -differential includes early pregnancy, ectopic pregnancy, or miscarriage. recommend serial hcg, if patient discharged today she should return in 48 hrs for repeat hcg.   -recommend evaluation by gen surg for abdominal pain and hx of hernia repair.

## 2017-09-03 NOTE — CONSULT NOTE ADULT - SUBJECTIVE AND OBJECTIVE BOX
HPI:  37F hx MO, DM, HL, HTN,  x2, repair of incisional hernia 2017 c/b abdominal wall abscess s/p IR drain placement 2017 for which she went home on IV Abx, presenting to [facility] ED with 4 day hx of intermittent crampy and at times sharp lower abdominal pain that she reports sometimes radiates to the left or the right.  She first noticed the pain after spending all day on her feet cooking. She reports some nausea and anorexia but otherwise tolerating po intake, but denies V/F/C/CP/SOB/Dysuria/Frequency/Urgency/Diarrhea.  Having normal BMs. She does note significant pressure in her pelvis when urinating or having a bowel movement.  She does report just finishing her menstrual period. She reports bloody BMs with menstruation because 'there's more blood on her pad'.    PAST MEDICAL & SURGICAL HISTORY:  Abdominal hernia  Obesity  Diabetes  Hyperlipidemia  Essential hypertension  H/O ventral hernia repair  H/O:   History of D&C  H/O LEEP      ROS: See HPI    SOCIAL HISTORY:  Smoke: pack a day smoker  EtOH: occasional  Denies IVDA    Vital Signs Last 24 Hrs  T(C): 36.7 (03 Sep 2017 12:22), Max: 36.9 (02 Sep 2017 22:48)  T(F): 98 (03 Sep 2017 12:22), Max: 98.5 (02 Sep 2017 22:48)  HR: 67 (03 Sep 2017 12:22) (67 - 96)  BP: 137/93 (03 Sep 2017 12:22) (137/93 - 160/90)  BP(mean): 120 (03 Sep 2017 06:20) (110 - 120)  RR: 17 (03 Sep 2017 12:22) (16 - 17)  SpO2: 98% (03 Sep 2017 12:22) (98% - 100%)    PHYSICAL EXAM    Gen: Age appropriate obese female in NAD  CV: RRR  Pulm: CTAB  Abd: Soft, nondistended.  Slight ttp over her right pannus with deep palpation.  Possible small bulge to the right of the lower midline. No erythema, induration, fluctuance noted.  Ext: mild peripheral edema  Rectal: No external hemorrhoids noted, internal exam reveals appropriate sphincter tone, stool in vault with no palpable perirectal masses.  Hemoccult negative.    LABS:                        12.6   12.0  )-----------( 420      ( 02 Sep 2017 23:43 )             38.9         140  |  102  |  10  ----------------------------<  166<H>  3.7   |  24  |  0.80    Ca    9.0      02 Sep 2017 23:43    TPro  7.5  /  Alb  3.7  /  TBili  <0.2  /  DBili  x   /  AST  15  /  ALT  13  /  AlkPhos  117        Urinalysis Basic - ( 03 Sep 2017 00:27 )    Color: Red / Appearance: Cloudy / S.020 / pH: x  Gluc: x / Ketone: Trace mg/dL  / Bili: Small / Urobili: 1.0 E.U./dL   Blood: x / Protein: 100 mg/dL / Nitrite: POSITIVE   Leuk Esterase: Moderate / RBC: Many /HPF / WBC > 10 /HPF   Sq Epi: x / Non Sq Epi: Few /HPF / Bacteria: Many /HPF        INTERPRETATION:  PELVIC ULTRASOUND dated 9/3/2017 5:08 AM    INDICATION: 37-year-old female with Lower abdominal pain; history of   recent abdominal herniorrhaphy with pain at the site of the surgery.   Reported LMP: Irregular, 2017    BHC mIU/ml    TECHNIQUE: Transabdominal views of the pelvis were obtained followed by   transvaginal views for better visualization of the ovaries.      PRIOR STUDIES: Pelvic ultrasound exam 10/13/2015    FINDINGS:   These images demonstrate the uterus to be anteverted. The uterus measures   up to 6.9 x 3.6 x 5.4 cm, and demonstrates a heterogeneous myometrium.   There is a  scar redemonstrated in the anterior lower uterine   segment.  No discrete myometrial masses are seen.  A small nabothian cyst   is evident.  The endometrium is 0.3 cm in thickness. No intrauterine   gestational sac is identified.    The right ovary is normal in size, measuring 1.8 x 1.3 x 2.1 cm. The left   ovary is normal in size, measuring 2.4 x 1.9 x 2.0 cm. No complex adenxal   masses are seen on either side. Doppler evaluation demonstrates normal   color flow and spectral waveforms to both ovaries with no evidence of   torsion.    There is no significant free pelvic fluid.      IMPRESSION:   No intrauterine gestation sac identified.     No complex adnexal masses to indicate an ectopic pregnancy. Recommend   correlation with serial beta-hCG.    MRI pending read

## 2017-09-03 NOTE — ED ADULT NURSE REASSESSMENT NOTE - NS ED NURSE REASSESS COMMENT FT1
Patient back from MRI and awaiting results. Patient aware with plan of care, will continue to monitor.

## 2017-09-03 NOTE — CONSULT NOTE ADULT - SUBJECTIVE AND OBJECTIVE BOX
CC: Abdominal pain    HPI: 37y  presents with 4 days of abdominal pain which worsened yesterday after washing dishes. Patient feels the pain is where she previously had her hernia repaired, she now feels a bulge where the hernia was repaired by Dr. Pride in , pain worse with Valsalva and repositioning. Pain radiates to the LLQ and to her back. +chills, no fevers. +nausea, no vomiting, was able to tolerate PO yesterday. States she is not able to walk as well. She is currently menstruating although states this is unlike her normal period, the color is lighter, and she is currently having rectal bleeding that started at the same time.         OB/GYN HISTORY:   [place]: 12  Parity: 2  Term: 0  : 2--  emergency CS at 28w for eclampsia, '10 CS at 32w for severe preeclampsia.   MAB/TAB/Ectopic: ~10, patient reports having many miscarriages w/ 2 D&Cs  Livin    Last Menstrual Period 17, currently menstruating, PCOS, hx of MARIAM III and LEEP in , MARIAM II in  for which she had a cold knife cone procedure, last pap in December and she believes it was normal. Hx of PID after an SAB, no hx of STDs  menarche at 11 yo, irreg utnil age 20, now reg 31d cycles. Last period ~ 1 month ago but lighter than usual         PAST MEDICAL & SURGICAL HISTORY:  Abdominal hernia  Obesity  Diabetes  Hyperlipidemia  Essential hypertension  H/O ventral hernia repair  H/O:   History of D&C  H/O LEEP      REVIEW OF SYSTEMS      neg except as mentioned above    MEDICATIONS  (STANDING):  sodium chloride 0.9%. 1000 milliLiter(s) (125 mL/Hr) IV Continuous <Continuous>    MEDICATIONS  (PRN):      Allergies    amoxicillin (Unknown)  iodine containing compounds (Hives; Anaphylaxis)  penicillin (Hives)  shellfish. (Hives; Anaphylaxis)    Intolerances        SOCIAL HISTORY:    FAMILY HISTORY:  No pertinent family history in first degree relatives      Vital Signs Last 24 Hrs  T(C): 36.8 (03 Sep 2017 06:20), Max: 36.9 (02 Sep 2017 22:48)  T(F): 98.3 (03 Sep 2017 06:20), Max: 98.5 (02 Sep 2017 22:48)  HR: 78 (03 Sep 2017 06:20) (75 - 96)  BP: 158/101 (03 Sep 2017 06:20) (152/89 - 160/90)  BP(mean): 120 (03 Sep 2017 06:20) (110 - 120)  RR: 16 (03 Sep 2017 06:20) (16 - 16)  SpO2: 100% (03 Sep 2017 06:20) (99% - 100%)    PHYSICAL EXAM:    Gen: NAD, AOx3, walks slowly and slightly hunched over  Chest: normal work of breathing  Abdomen: obese, soft, nondistended, no rebound or guarding, mild tenderness midline inferior to umbilicus on deep palpation. small area of tissue at panus which is firm, nonerythematous, tender, no fluctuance, no warmth  Pelvic: normal external genitalia, mild amount of vaginal bleeding, cervix appears normal although visualization suboptimal 2/2 body habitus. no cmt, no masses  Extremities: MAEW, WWP      LABS:                        12.6   12.0  )-----------( 420      ( 02 Sep 2017 23:43 )             38.9         140  |  102  |  10  ----------------------------<  166<H>  3.7   |  24  |  0.80    Ca    9.0      02 Sep 2017 23:43    TPro  7.5  /  Alb  3.7  /  TBili  <0.2  /  DBili  x   /  AST  15  /  ALT  13  /  AlkPhos  117  09-02      Urinalysis Basic - ( 03 Sep 2017 00:27 )    Color: Red / Appearance: Cloudy / S.020 / pH: x  Gluc: x / Ketone: Trace mg/dL  / Bili: Small / Urobili: 1.0 E.U./dL   Blood: x / Protein: 100 mg/dL / Nitrite: POSITIVE   Leuk Esterase: Moderate / RBC: Many /HPF / WBC > 10 /HPF   Sq Epi: x / Non Sq Epi: Few /HPF / Bacteria: Many /HPF    HCG 16    RADIOLOGY & ADDITIONAL STUDIES:    < from: US OB/GYN Early Sonogram (17 @ 05:08) >  INTERPRETATION:    Resident preliminary report.        PELVIC ULTRASOUND dated 9/3/2017 5:08 AM    INDICATION: Lower abdominal pain; history of recent abdominal   herniorrhaphy with pain and the site of the surgery LMP: Irregular,   2017    BHC mIU/ml    TECHNIQUE: Transabdominal views of the pelvis were obtained followed by   transvaginal views for better visualization of the ovaries.      PRIOR STUDIES: None    FINDINGS:   These images demonstrate the uterus to be anteverted. The uterus is   normal in size and shape.  The uterus is 6.9 x 3.6 x 5.4 cm.  No   myometrial abnormalities are seen.  A small nabothian cyst is evident.    The endometrium is 0.3 cm in thickness.    The right ovary is normal in size, measuring 1.8 x 1.3 x 2.1cm. No right   ovarian masses are seen. The left ovary is normal in size, measuring 2.4   x 1.9 x 2.0 cm. No left ovarian masses are seen. Doppler evaluation   demonstrates flow to both ovaries with no evidence of torsion.      IMPRESSION:   No intrauterine gestation sac identified. No adnexal masses. Recommend   correlation with serial beta-hCG.      < end of copied text >

## 2017-09-03 NOTE — ED ADULT NURSE REASSESSMENT NOTE - NS ED NURSE REASSESS COMMENT FT1
Transfer of care acknowledged with bedside rounding. Patient is NAD, speaking full sentences, chest rise is equal and symmetrical bilaterally. Lab and ultrasound results reviewed, patient awaiting to go to MRI and aware with plan of care, will continue to monitor.

## 2017-09-03 NOTE — ED ADULT NURSE REASSESSMENT NOTE - NS ED NURSE REASSESS COMMENT FT1
Surgery consult at bedside with patient, patient awaiting go to MRI and aware with plan of care, will continue to monitor.

## 2017-09-03 NOTE — CONSULT NOTE ADULT - ASSESSMENT
37F s/p repair of incisional hernia 1/8/2017 c/b abdominal wall abscess s/p IR drain placement 2/7/2017 for which she went home on IV Abx, now here with vague intermittent lower abd/pelvic pain, with a UTI and possible pregnancy, currently in stable condition.  - Reviewed MRI images with Dr. Hurtado, showing similar fluid collection from previous CT after drainage.  - Given lack of fever and vague migratory pain, do not think that this fluid collection is her primary issue.  - Please have the patient follow up with Dr. Hurtado in the office next week to discuss further management.  - Follow up pregnancy testing as per GYN  - Abx for UTI as per ED  - Dispo as per ED  - Plan discussed with Chief on Call and Dr. Hurtado.

## 2017-09-03 NOTE — ED ADULT NURSE REASSESSMENT NOTE - NS ED NURSE REASSESS COMMENT FT1
pt. returned from ct scan with c/o itchy rash to face, chest, abdomen, md notified, benadryl was administered. will monitor.

## 2017-09-04 LAB
C TRACH RRNA SPEC QL NAA+PROBE: SIGNIFICANT CHANGE UP
CULTURE RESULTS: SIGNIFICANT CHANGE UP
N GONORRHOEA RRNA SPEC QL NAA+PROBE: SIGNIFICANT CHANGE UP
SPECIMEN SOURCE: SIGNIFICANT CHANGE UP
SPECIMEN SOURCE: SIGNIFICANT CHANGE UP

## 2017-09-05 PROCEDURE — 83690 ASSAY OF LIPASE: CPT

## 2017-09-05 PROCEDURE — 36415 COLL VENOUS BLD VENIPUNCTURE: CPT

## 2017-09-05 PROCEDURE — 85025 COMPLETE CBC W/AUTO DIFF WBC: CPT

## 2017-09-05 PROCEDURE — 74181 MRI ABDOMEN W/O CONTRAST: CPT

## 2017-09-05 PROCEDURE — 99284 EMERGENCY DEPT VISIT MOD MDM: CPT | Mod: 25

## 2017-09-05 PROCEDURE — 80053 COMPREHEN METABOLIC PANEL: CPT

## 2017-09-05 PROCEDURE — 96374 THER/PROPH/DIAG INJ IV PUSH: CPT

## 2017-09-05 PROCEDURE — 81001 URINALYSIS AUTO W/SCOPE: CPT

## 2017-09-05 PROCEDURE — 76815 OB US LIMITED FETUS(S): CPT

## 2017-09-05 PROCEDURE — 87086 URINE CULTURE/COLONY COUNT: CPT

## 2017-09-05 PROCEDURE — 96375 TX/PRO/DX INJ NEW DRUG ADDON: CPT

## 2017-09-05 PROCEDURE — 81025 URINE PREGNANCY TEST: CPT

## 2017-09-05 PROCEDURE — 72195 MRI PELVIS W/O DYE: CPT

## 2017-09-05 PROCEDURE — 84702 CHORIONIC GONADOTROPIN TEST: CPT

## 2017-09-08 DIAGNOSIS — Z91.013 ALLERGY TO SEAFOOD: ICD-10-CM

## 2017-09-08 DIAGNOSIS — R14.0 ABDOMINAL DISTENSION (GASEOUS): ICD-10-CM

## 2017-09-08 DIAGNOSIS — E78.5 HYPERLIPIDEMIA, UNSPECIFIED: ICD-10-CM

## 2017-09-08 DIAGNOSIS — Z88.8 ALLERGY STATUS TO OTHER DRUGS, MEDICAMENTS AND BIOLOGICAL SUBSTANCES: ICD-10-CM

## 2017-09-08 DIAGNOSIS — F17.200 NICOTINE DEPENDENCE, UNSPECIFIED, UNCOMPLICATED: ICD-10-CM

## 2017-09-08 DIAGNOSIS — E11.9 TYPE 2 DIABETES MELLITUS WITHOUT COMPLICATIONS: ICD-10-CM

## 2017-09-08 DIAGNOSIS — I10 ESSENTIAL (PRIMARY) HYPERTENSION: ICD-10-CM

## 2017-09-08 DIAGNOSIS — N39.0 URINARY TRACT INFECTION, SITE NOT SPECIFIED: ICD-10-CM

## 2017-09-08 DIAGNOSIS — Z88.0 ALLERGY STATUS TO PENICILLIN: ICD-10-CM

## 2017-09-08 DIAGNOSIS — R10.33 PERIUMBILICAL PAIN: ICD-10-CM

## 2017-10-09 ENCOUNTER — EMERGENCY (EMERGENCY)
Facility: HOSPITAL | Age: 37
LOS: 1 days | Discharge: HOME | End: 2017-10-09
Attending: EMERGENCY MEDICINE | Admitting: EMERGENCY MEDICINE
Payer: COMMERCIAL

## 2017-10-09 VITALS
DIASTOLIC BLOOD PRESSURE: 99 MMHG | HEART RATE: 93 BPM | TEMPERATURE: 99 F | WEIGHT: 244.49 LBS | OXYGEN SATURATION: 97 % | SYSTOLIC BLOOD PRESSURE: 162 MMHG | RESPIRATION RATE: 18 BRPM

## 2017-10-09 DIAGNOSIS — Z98.890 OTHER SPECIFIED POSTPROCEDURAL STATES: Chronic | ICD-10-CM

## 2017-10-09 DIAGNOSIS — Z98.89 OTHER SPECIFIED POSTPROCEDURAL STATES: Chronic | ICD-10-CM

## 2017-10-09 LAB
ALBUMIN SERPL ELPH-MCNC: 4.2 G/DL — SIGNIFICANT CHANGE UP (ref 3.3–5)
ALP SERPL-CCNC: 112 U/L — SIGNIFICANT CHANGE UP (ref 40–120)
ALT FLD-CCNC: 14 U/L — SIGNIFICANT CHANGE UP (ref 10–45)
ANION GAP SERPL CALC-SCNC: 14 MMOL/L — SIGNIFICANT CHANGE UP (ref 5–17)
APPEARANCE UR: CLEAR — SIGNIFICANT CHANGE UP
APTT BLD: 31.2 SEC — SIGNIFICANT CHANGE UP (ref 27.5–37.4)
AST SERPL-CCNC: 16 U/L — SIGNIFICANT CHANGE UP (ref 10–40)
BILIRUB SERPL-MCNC: <0.2 MG/DL — SIGNIFICANT CHANGE UP (ref 0.2–1.2)
BILIRUB UR-MCNC: NEGATIVE — SIGNIFICANT CHANGE UP
BUN SERPL-MCNC: 7 MG/DL — SIGNIFICANT CHANGE UP (ref 7–23)
CALCIUM SERPL-MCNC: 9.9 MG/DL — SIGNIFICANT CHANGE UP (ref 8.4–10.5)
CHLORIDE SERPL-SCNC: 101 MMOL/L — SIGNIFICANT CHANGE UP (ref 96–108)
CO2 SERPL-SCNC: 25 MMOL/L — SIGNIFICANT CHANGE UP (ref 22–31)
COLOR SPEC: YELLOW — SIGNIFICANT CHANGE UP
CREAT SERPL-MCNC: 0.7 MG/DL — SIGNIFICANT CHANGE UP (ref 0.5–1.3)
DIFF PNL FLD: NEGATIVE — SIGNIFICANT CHANGE UP
GLUCOSE SERPL-MCNC: 134 MG/DL — HIGH (ref 70–99)
GLUCOSE UR QL: NEGATIVE — SIGNIFICANT CHANGE UP
HCG SERPL-ACNC: HIGH MIU/ML
HCT VFR BLD CALC: 40.1 % — SIGNIFICANT CHANGE UP (ref 34.5–45)
HGB BLD-MCNC: 12.6 G/DL — SIGNIFICANT CHANGE UP (ref 11.5–15.5)
INR BLD: 1.05 — SIGNIFICANT CHANGE UP (ref 0.88–1.16)
KETONES UR-MCNC: NEGATIVE — SIGNIFICANT CHANGE UP
LACTATE SERPL-SCNC: 1.6 MMOL/L — SIGNIFICANT CHANGE UP (ref 0.5–2)
LEUKOCYTE ESTERASE UR-ACNC: NEGATIVE — SIGNIFICANT CHANGE UP
MCHC RBC-ENTMCNC: 24.5 PG — LOW (ref 27–34)
MCHC RBC-ENTMCNC: 31.4 G/DL — LOW (ref 32–36)
MCV RBC AUTO: 78 FL — LOW (ref 80–100)
NITRITE UR-MCNC: NEGATIVE — SIGNIFICANT CHANGE UP
PH UR: 6 — SIGNIFICANT CHANGE UP (ref 5–8)
PLATELET # BLD AUTO: 375 K/UL — SIGNIFICANT CHANGE UP (ref 150–400)
POTASSIUM SERPL-MCNC: 4.3 MMOL/L — SIGNIFICANT CHANGE UP (ref 3.5–5.3)
POTASSIUM SERPL-SCNC: 4.3 MMOL/L — SIGNIFICANT CHANGE UP (ref 3.5–5.3)
PROT SERPL-MCNC: 7.9 G/DL — SIGNIFICANT CHANGE UP (ref 6–8.3)
PROT UR-MCNC: NEGATIVE MG/DL — SIGNIFICANT CHANGE UP
PROTHROM AB SERPL-ACNC: 11.7 SEC — SIGNIFICANT CHANGE UP (ref 9.8–12.7)
RBC # BLD: 5.14 M/UL — SIGNIFICANT CHANGE UP (ref 3.8–5.2)
RBC # FLD: 17.4 % — HIGH (ref 10.3–16.9)
SODIUM SERPL-SCNC: 140 MMOL/L — SIGNIFICANT CHANGE UP (ref 135–145)
SP GR SPEC: 1.02 — SIGNIFICANT CHANGE UP (ref 1–1.03)
UROBILINOGEN FLD QL: 0.2 E.U./DL — SIGNIFICANT CHANGE UP
WBC # BLD: 12.6 K/UL — HIGH (ref 3.8–10.5)
WBC # FLD AUTO: 12.6 K/UL — HIGH (ref 3.8–10.5)

## 2017-10-09 PROCEDURE — 87086 URINE CULTURE/COLONY COUNT: CPT

## 2017-10-09 PROCEDURE — 81003 URINALYSIS AUTO W/O SCOPE: CPT

## 2017-10-09 PROCEDURE — 76817 TRANSVAGINAL US OBSTETRIC: CPT

## 2017-10-09 PROCEDURE — 86900 BLOOD TYPING SEROLOGIC ABO: CPT

## 2017-10-09 PROCEDURE — 84702 CHORIONIC GONADOTROPIN TEST: CPT

## 2017-10-09 PROCEDURE — 83605 ASSAY OF LACTIC ACID: CPT

## 2017-10-09 PROCEDURE — 76817 TRANSVAGINAL US OBSTETRIC: CPT | Mod: 26

## 2017-10-09 PROCEDURE — 85027 COMPLETE CBC AUTOMATED: CPT

## 2017-10-09 PROCEDURE — 85730 THROMBOPLASTIN TIME PARTIAL: CPT

## 2017-10-09 PROCEDURE — 87186 SC STD MICRODIL/AGAR DIL: CPT

## 2017-10-09 PROCEDURE — 85610 PROTHROMBIN TIME: CPT

## 2017-10-09 PROCEDURE — 86901 BLOOD TYPING SEROLOGIC RH(D): CPT

## 2017-10-09 PROCEDURE — 99285 EMERGENCY DEPT VISIT HI MDM: CPT

## 2017-10-09 PROCEDURE — 87184 SC STD DISK METHOD PER PLATE: CPT

## 2017-10-09 PROCEDURE — 87070 CULTURE OTHR SPECIMN AEROBIC: CPT

## 2017-10-09 PROCEDURE — 36415 COLL VENOUS BLD VENIPUNCTURE: CPT

## 2017-10-09 PROCEDURE — 76857 US EXAM PELVIC LIMITED: CPT

## 2017-10-09 PROCEDURE — 80053 COMPREHEN METABOLIC PANEL: CPT

## 2017-10-09 PROCEDURE — 76815 OB US LIMITED FETUS(S): CPT | Mod: 26

## 2017-10-09 PROCEDURE — 76705 ECHO EXAM OF ABDOMEN: CPT | Mod: 26

## 2017-10-09 PROCEDURE — 76801 OB US < 14 WKS SINGLE FETUS: CPT

## 2017-10-09 PROCEDURE — 99284 EMERGENCY DEPT VISIT MOD MDM: CPT | Mod: 25

## 2017-10-09 PROCEDURE — 86850 RBC ANTIBODY SCREEN: CPT

## 2017-10-09 RX ORDER — ACETAMINOPHEN 500 MG
650 TABLET ORAL ONCE
Qty: 0 | Refills: 0 | Status: COMPLETED | OUTPATIENT
Start: 2017-10-09 | End: 2017-10-09

## 2017-10-09 RX ADMIN — Medication 650 MILLIGRAM(S): at 22:52

## 2017-10-09 NOTE — CONSULT NOTE ADULT - SUBJECTIVE AND OBJECTIVE BOX
HPI:  37F hx MO, DM, HL, HTN,  x2, repair of incisional hernia 2017 c/b abdominal wall abscess s/p IR drain placement 2017 for which she went home on IV Abx, presenting to [facility] ED with nausea for weeks, fevers/chills for several days, and opening of her wound yesterday.  She noticed that her wound opened up last evening with minimal drainage since then.  Today she notes similar discomfort around the area but no acute changes otherwise.  Denies CP/SOB/Change in BF/Dysuria/Frequency.    PAST MEDICAL & SURGICAL HISTORY:  Abdominal hernia  Obesity  Diabetes  Hyperlipidemia  Essential hypertension  H/O ventral hernia repair  H/O:   History of D&C  H/O LEEP    Allergies    amoxicillin (Unknown)  iodine containing compounds (Hives; Anaphylaxis)  penicillin (Hives)  shellfish. (Hives; Anaphylaxis)    SOCIAL HISTORY:  Smoke: pack a day smoker  EtOH: occasional  Denies IVDA      Vital Signs Last 24 Hrs  T(C): 37 (09 Oct 2017 17:00), Max: 37 (09 Oct 2017 17:00)  T(F): 98.6 (09 Oct 2017 17:00), Max: 98.6 (09 Oct 2017 17:00)  HR: 93 (09 Oct 2017 17:00) (93 - 93)  BP: 162/99 (09 Oct 2017 17:00) (162/99 - 162/99)  BP(mean): --  RR: 18 (09 Oct 2017 17:00) (18 - 18)  SpO2: 97% (09 Oct 2017 17:00) (97% - 97%)    PHYSICAL EXAM:      Gen: Age appropriate obese female in NAD  CV: RRR  Pulm: CTAB  Abd: Soft, nondistended.  Slight ttp over her right pannus.  No erythema, induration, fluctuance noted. Midline of here healed pfannensteil incision there is a small opening with fibrinous material, not currently draining any fluid.  Unable to express any fluid.  Ext: mild peripheral edema        LABS:                        12.6   12.6  )-----------( 375      ( 09 Oct 2017 18:58 )             40.1     10-    140  |  101  |  7   ----------------------------<  134<H>  4.3   |  25  |  0.70    Ca    9.9      09 Oct 2017 18:58    TPro  7.9  /  Alb  4.2  /  TBili  <0.2  /  DBili  x   /  AST  16  /  ALT  14  /  AlkPhos  112  10-09    PT/INR - ( 09 Oct 2017 23:15 )   PT: 11.7 sec;   INR: 1.05          PTT - ( 09 Oct 2017 23:15 )  PTT:31.2 sec  Urinalysis Basic - ( 09 Oct 2017 19:04 )    Color: Yellow / Appearance: Clear / S.025 / pH: x  Gluc: x / Ketone: NEGATIVE  / Bili: Negative / Urobili: 0.2 E.U./dL   Blood: x / Protein: NEGATIVE mg/dL / Nitrite: NEGATIVE   Leuk Esterase: NEGATIVE / RBC: x / WBC x   Sq Epi: x / Non Sq Epi: x / Bacteria: x        RADIOLOGY & ADDITIONAL STUDIES:    INTERPRETATION:  Ultrasound of the left lower quadrant    Indication: History of ventral hernia repair with small area of wound   dehiscence in the lower left abdomen at incision site. Evaluate for   collection.     Prior:None    Findings: At the area of wound dehiscence in the left lower abdomen there   is visualization of pus superficially. Ultrasound examination   demonstrates an avascular complex fluid collection immediately below the   skin, measuring 5.4 x 2.2 cm.    Impression: 5.4 cm complex fluid collection at site of wound dehiscence   in the left lower abdomen. Cannot exclude an evolving abscess collection   and clinical correlation is recommended.      MRI 9/3/17  INTERPRETATION:  EXAM: MRI ABDOMEN WITHOUT IV CONTRAST; MRI PELVIS   WITHOUT IV CONTRAST  Pelvis:  Postoperative changes of ventral pelvic herniorrhaphy are seen, with a   multiloculated fluid collection in the region of the surgical bed. This   is comprised of an approximate 7 x 2.1 x 2 cm (ML by AP by CC dimensions)   superficial collection, which communicates with an irregular more deeper   component measuring roughly 10.1 x 2 x 4.6 cm, in the region of the   herniorrhaphy mesh.  The uterus and both ovaries have an unremarkable nonenhanced MRI   appearance.   No complex pelvic mass or lymphadenopathy. No free pelvic fluid.  Urinary bladder is unremarkable in appearance, without an internal   filling defect.  Bone marrow signal is unremarkable.

## 2017-10-09 NOTE — ED PROVIDER NOTE - OBJECTIVE STATEMENT
37y female PMH reviewed including ventral hernia repair 1/2017, recent treatment for UTI, recent ED visit for abd pain at which time HCG was equivocal at 16, had abd MRI and surgical consult, presents today with multiple complaints including continued vaginal DC (clear, thick; no new sexual partners, (-)GC/Chlamydia from 9/17); noted foul odor and slight dehiscence of lower abd incision site yesterday and associated pain to the site (subjective fever, vomiting, diarrhea); concern that she is pregnant given recent HCG. No vaginal bleeding. Normal PO. No dysuria. No chest pain or shortness of breath,. 37y female PMH reviewed including ventral hernia repair 1/2017, recent treatment for UTI, recent ED visit for abd pain at which time HCG was equivocal at 16, had abd MRI ("A multiloculated fluid collection in the region of prior herniorrhaphy repair in the ventral pelvic wall, as described.") and surgical consult no intervention recommended at the time, presents today with multiple complaints including continued vaginal DC (clear, thick; no new sexual partners, (-)GC/Chlamydia from 9/17); noted foul odor and slight dehiscence of lower abd incision site yesterday and associated pain to the site (subjective fever, vomiting, diarrhea); concern that she is pregnant given recent HCG. No vaginal bleeding. Normal PO. No dysuria. No chest pain or shortness of breath,.

## 2017-10-09 NOTE — CONSULT NOTE ADULT - ASSESSMENT
37F s/p repair of incisional hernia 1/8/2017 c/b abdominal wall abscess s/p IR drain placement 2/7/2017 for which she went home on IV Abx, now here with an ectopic pregnancy requiring emergent GYN surgery.  - Pt has a chronically draining sinus above her 8x1cm2 mesh placed for her incisional hernia repair.  Not currently concerned with an active infection given the lack of erythema/induration or drainage from the site. Would continue local wound care and have the pt follow up with Dr. Hurtado.  - With the expectation of an emergent laparoscopic procedure, would recommend a supraumbilical laparoscopic port placement with lateral trocar placements at the level of the umbilicus to ensure not involving the mesh or the chronic draining sinus.  - Discussed with Chief resident on Call.

## 2017-10-09 NOTE — ED PROVIDER NOTE - MEDICAL DECISION MAKING DETAILS
37y female with small area of wound dehiscence noted yesterday, vaginal DC for several weeks, unclear pregnancy status. HDS. 37y female with small area of wound dehiscence noted yesterday, vaginal DC for several weeks, unclear pregnancy status. HDS. Labs including HCG and UA pending. Spoke with surgery for consultation.

## 2017-10-09 NOTE — ED PROVIDER NOTE - PROGRESS NOTE DETAILS
Labs reviewed. HCG elevated to 12k. Pelvic sono ordered. Also ordered sono for abd wall incision site to evaluate for possible fluid collection. D/w surgery again who will see pt in ED. Labs reviewed. HCG elevated to 12k. Pelvic sono ordered. Also ordered sono for abd wall incision site to evaluate for possible fluid collection. D/w surgery again who will see pt in ED. D/w pt - made aware of results. C/o mild headache, tylenol ordered. pt signed out pending u/s pelvis, which shows 2.6 cm hyperechoic mass in left adnexa concerning for ectopic pregnancy, GYN called and will eval in ED.  Surgery also in ED evaluating left lower abd wound from ventral hernia repair.  Will f/u general surgery recommendation and GYN recommendation. patient seen by gyn.  concern for ectopic pregnancy.  [facility], appropriate treatment is surgery.  patient refused.  as second best alternative, patient offered methotrexate but also refused.  states she will return tomorrow for operation.  alert and oriented x3.  understands risk of rupture, worsening illness, potential life threat, permanent disability/death.  accepts.  will return if symptoms worsen or changes mind.   -MA

## 2017-10-09 NOTE — ED PROVIDER NOTE - CARE PLAN
Principal Discharge DX:	Pregnancy  Secondary Diagnosis:	Abdominal pain Principal Discharge DX:	Ectopic pregnancy  Secondary Diagnosis:	Abdominal pain

## 2017-10-09 NOTE — ED PROVIDER NOTE - SHIFT CHANGE DETAILS
Pt awaiting pelvic sono for +HCG, abd wall sono to assess for fluid collection, surgical consult. Dispo as per sono results and consultation.

## 2017-10-10 ENCOUNTER — INPATIENT (INPATIENT)
Facility: HOSPITAL | Age: 37
LOS: 0 days | Discharge: HOME | DRG: 777 | End: 2017-10-10
Attending: OBSTETRICS & GYNECOLOGY | Admitting: OBSTETRICS & GYNECOLOGY
Payer: COMMERCIAL

## 2017-10-10 ENCOUNTER — OUTPATIENT (OUTPATIENT)
Dept: OUTPATIENT SERVICES | Facility: HOSPITAL | Age: 37
LOS: 1 days | Discharge: HOME | End: 2017-10-10
Payer: COMMERCIAL

## 2017-10-10 ENCOUNTER — RESULT REVIEW (OUTPATIENT)
Age: 37
End: 2017-10-10

## 2017-10-10 ENCOUNTER — TRANSCRIPTION ENCOUNTER (OUTPATIENT)
Age: 37
End: 2017-10-10

## 2017-10-10 VITALS
DIASTOLIC BLOOD PRESSURE: 90 MMHG | TEMPERATURE: 98 F | SYSTOLIC BLOOD PRESSURE: 145 MMHG | OXYGEN SATURATION: 99 % | RESPIRATION RATE: 18 BRPM | HEART RATE: 98 BPM | WEIGHT: 240.3 LBS

## 2017-10-10 VITALS
OXYGEN SATURATION: 100 % | SYSTOLIC BLOOD PRESSURE: 127 MMHG | DIASTOLIC BLOOD PRESSURE: 63 MMHG | RESPIRATION RATE: 16 BRPM | HEART RATE: 80 BPM

## 2017-10-10 DIAGNOSIS — Z98.89 OTHER SPECIFIED POSTPROCEDURAL STATES: Chronic | ICD-10-CM

## 2017-10-10 DIAGNOSIS — E11.9 TYPE 2 DIABETES MELLITUS WITHOUT COMPLICATIONS: ICD-10-CM

## 2017-10-10 DIAGNOSIS — J45.909 UNSPECIFIED ASTHMA, UNCOMPLICATED: ICD-10-CM

## 2017-10-10 DIAGNOSIS — I10 ESSENTIAL (PRIMARY) HYPERTENSION: ICD-10-CM

## 2017-10-10 DIAGNOSIS — Z98.890 OTHER SPECIFIED POSTPROCEDURAL STATES: Chronic | ICD-10-CM

## 2017-10-10 DIAGNOSIS — E66.9 OBESITY, UNSPECIFIED: ICD-10-CM

## 2017-10-10 DIAGNOSIS — Z88.0 ALLERGY STATUS TO PENICILLIN: ICD-10-CM

## 2017-10-10 DIAGNOSIS — Z91.013 ALLERGY TO SEAFOOD: ICD-10-CM

## 2017-10-10 DIAGNOSIS — O00.102 LEFT TUBAL PREGNANCY WITHOUT INTRAUTERINE PREGNANCY: ICD-10-CM

## 2017-10-10 LAB
ALBUMIN SERPL ELPH-MCNC: 4.4 G/DL — SIGNIFICANT CHANGE UP (ref 3.3–5)
ALP SERPL-CCNC: 114 U/L — SIGNIFICANT CHANGE UP (ref 40–120)
ALT FLD-CCNC: 16 U/L — SIGNIFICANT CHANGE UP (ref 10–45)
ANION GAP SERPL CALC-SCNC: 19 MMOL/L — HIGH (ref 5–17)
APTT BLD: 32.1 SEC — SIGNIFICANT CHANGE UP (ref 27.5–37.4)
AST SERPL-CCNC: 23 U/L — SIGNIFICANT CHANGE UP (ref 10–40)
BASOPHILS NFR BLD AUTO: 0.2 % — SIGNIFICANT CHANGE UP (ref 0–2)
BILIRUB SERPL-MCNC: 0.2 MG/DL — SIGNIFICANT CHANGE UP (ref 0.2–1.2)
BLD GP AB SCN SERPL QL: NEGATIVE — SIGNIFICANT CHANGE UP
BLD GP AB SCN SERPL QL: NEGATIVE — SIGNIFICANT CHANGE UP
BUN SERPL-MCNC: 7 MG/DL — SIGNIFICANT CHANGE UP (ref 7–23)
CALCIUM SERPL-MCNC: 9.6 MG/DL — SIGNIFICANT CHANGE UP (ref 8.4–10.5)
CHLORIDE SERPL-SCNC: 99 MMOL/L — SIGNIFICANT CHANGE UP (ref 96–108)
CO2 SERPL-SCNC: 22 MMOL/L — SIGNIFICANT CHANGE UP (ref 22–31)
CREAT SERPL-MCNC: 0.7 MG/DL — SIGNIFICANT CHANGE UP (ref 0.5–1.3)
EOSINOPHIL NFR BLD AUTO: 0.6 % — SIGNIFICANT CHANGE UP (ref 0–6)
GLUCOSE SERPL-MCNC: 165 MG/DL — HIGH (ref 70–99)
GRAM STN FLD: SIGNIFICANT CHANGE UP
GRAM STN FLD: SIGNIFICANT CHANGE UP
HCT VFR BLD CALC: 40.2 % — SIGNIFICANT CHANGE UP (ref 34.5–45)
HGB BLD-MCNC: 13 G/DL — SIGNIFICANT CHANGE UP (ref 11.5–15.5)
INR BLD: 1.08 — SIGNIFICANT CHANGE UP (ref 0.88–1.16)
LYMPHOCYTES # BLD AUTO: 20.1 % — SIGNIFICANT CHANGE UP (ref 13–44)
MCHC RBC-ENTMCNC: 25.1 PG — LOW (ref 27–34)
MCHC RBC-ENTMCNC: 32.3 G/DL — SIGNIFICANT CHANGE UP (ref 32–36)
MCV RBC AUTO: 77.6 FL — LOW (ref 80–100)
MONOCYTES NFR BLD AUTO: 5.6 % — SIGNIFICANT CHANGE UP (ref 2–14)
NEUTROPHILS NFR BLD AUTO: 73.5 % — SIGNIFICANT CHANGE UP (ref 43–77)
PLATELET # BLD AUTO: 395 K/UL — SIGNIFICANT CHANGE UP (ref 150–400)
POTASSIUM SERPL-MCNC: 3.1 MMOL/L — LOW (ref 3.5–5.3)
POTASSIUM SERPL-SCNC: 3.1 MMOL/L — LOW (ref 3.5–5.3)
PROT SERPL-MCNC: 8.2 G/DL — SIGNIFICANT CHANGE UP (ref 6–8.3)
PROTHROM AB SERPL-ACNC: 12 SEC — SIGNIFICANT CHANGE UP (ref 9.8–12.7)
RBC # BLD: 5.18 M/UL — SIGNIFICANT CHANGE UP (ref 3.8–5.2)
RBC # FLD: 17.2 % — HIGH (ref 10.3–16.9)
RH IG SCN BLD-IMP: NEGATIVE — SIGNIFICANT CHANGE UP
RH IG SCN BLD-IMP: NEGATIVE — SIGNIFICANT CHANGE UP
SODIUM SERPL-SCNC: 140 MMOL/L — SIGNIFICANT CHANGE UP (ref 135–145)
SPECIMEN SOURCE: SIGNIFICANT CHANGE UP
WBC # BLD: 12.1 K/UL — HIGH (ref 3.8–10.5)
WBC # FLD AUTO: 12.1 K/UL — HIGH (ref 3.8–10.5)

## 2017-10-10 PROCEDURE — 85025 COMPLETE CBC W/AUTO DIFF WBC: CPT

## 2017-10-10 PROCEDURE — 85730 THROMBOPLASTIN TIME PARTIAL: CPT

## 2017-10-10 PROCEDURE — 36415 COLL VENOUS BLD VENIPUNCTURE: CPT

## 2017-10-10 PROCEDURE — 86900 BLOOD TYPING SEROLOGIC ABO: CPT

## 2017-10-10 PROCEDURE — C1889: CPT

## 2017-10-10 PROCEDURE — 36430 TRANSFUSION BLD/BLD COMPNT: CPT

## 2017-10-10 PROCEDURE — 99285 EMERGENCY DEPT VISIT HI MDM: CPT | Mod: 25

## 2017-10-10 PROCEDURE — 99285 EMERGENCY DEPT VISIT HI MDM: CPT

## 2017-10-10 PROCEDURE — 80053 COMPREHEN METABOLIC PANEL: CPT

## 2017-10-10 PROCEDURE — 84702 CHORIONIC GONADOTROPIN TEST: CPT

## 2017-10-10 PROCEDURE — 59151 TREAT ECTOPIC PREGNANCY: CPT

## 2017-10-10 PROCEDURE — 88305 TISSUE EXAM BY PATHOLOGIST: CPT

## 2017-10-10 PROCEDURE — 86850 RBC ANTIBODY SCREEN: CPT

## 2017-10-10 PROCEDURE — 86901 BLOOD TYPING SEROLOGIC RH(D): CPT

## 2017-10-10 PROCEDURE — 85610 PROTHROMBIN TIME: CPT

## 2017-10-10 RX ORDER — METOCLOPRAMIDE HCL 10 MG
10 TABLET ORAL EVERY 6 HOURS
Qty: 0 | Refills: 0 | Status: DISCONTINUED | OUTPATIENT
Start: 2017-10-10 | End: 2017-10-10

## 2017-10-10 RX ORDER — MORPHINE SULFATE 50 MG/1
4 CAPSULE, EXTENDED RELEASE ORAL
Qty: 0 | Refills: 0 | Status: DISCONTINUED | OUTPATIENT
Start: 2017-10-10 | End: 2017-10-10

## 2017-10-10 RX ORDER — ONDANSETRON 8 MG/1
4 TABLET, FILM COATED ORAL ONCE
Qty: 0 | Refills: 0 | Status: COMPLETED | OUTPATIENT
Start: 2017-10-10 | End: 2017-10-10

## 2017-10-10 RX ORDER — OXYCODONE AND ACETAMINOPHEN 5; 325 MG/1; MG/1
2 TABLET ORAL EVERY 6 HOURS
Qty: 0 | Refills: 0 | Status: DISCONTINUED | OUTPATIENT
Start: 2017-10-10 | End: 2017-10-10

## 2017-10-10 RX ORDER — OXYCODONE AND ACETAMINOPHEN 5; 325 MG/1; MG/1
1 TABLET ORAL EVERY 4 HOURS
Qty: 0 | Refills: 0 | Status: DISCONTINUED | OUTPATIENT
Start: 2017-10-10 | End: 2017-10-10

## 2017-10-10 RX ORDER — SODIUM CHLORIDE 9 MG/ML
1000 INJECTION, SOLUTION INTRAVENOUS
Qty: 0 | Refills: 0 | Status: DISCONTINUED | OUTPATIENT
Start: 2017-10-10 | End: 2017-10-10

## 2017-10-10 RX ADMIN — MORPHINE SULFATE 4 MILLIGRAM(S): 50 CAPSULE, EXTENDED RELEASE ORAL at 19:25

## 2017-10-10 RX ADMIN — MORPHINE SULFATE 4 MILLIGRAM(S): 50 CAPSULE, EXTENDED RELEASE ORAL at 20:16

## 2017-10-10 RX ADMIN — ONDANSETRON 4 MILLIGRAM(S): 8 TABLET, FILM COATED ORAL at 19:54

## 2017-10-10 RX ADMIN — OXYCODONE AND ACETAMINOPHEN 1 TABLET(S): 5; 325 TABLET ORAL at 20:47

## 2017-10-10 RX ADMIN — MORPHINE SULFATE 4 MILLIGRAM(S): 50 CAPSULE, EXTENDED RELEASE ORAL at 19:08

## 2017-10-10 RX ADMIN — SODIUM CHLORIDE 125 MILLILITER(S): 9 INJECTION, SOLUTION INTRAVENOUS at 19:00

## 2017-10-10 RX ADMIN — MORPHINE SULFATE 4 MILLIGRAM(S): 50 CAPSULE, EXTENDED RELEASE ORAL at 19:58

## 2017-10-10 NOTE — ED PROVIDER NOTE - MEDICAL DECISION MAKING DETAILS
36yo F returns to ED for evaluation of her ectopic pregnancy and RH negative status. Preop labs sent, rhogam ordered and blood bank aware. to go to OR today.

## 2017-10-10 NOTE — ED ADULT NURSE NOTE - OBJECTIVE STATEMENT
Pt presents to ED c/o pregnancy problem. Pt presented to ED yesterday for LLQ wound dehisence s/p hernia repair in Jan 2017 at [facility] ED. Pt was found to have ectopic pregnancy at that time, left AMA and returns today for Rhogam, possible surgical intervention. Pt reports a possible syncopal episode yesterday, pt states "I was coming back from the bathroom and I was fine but then suddenly I just faceplanted on the bed, I hit my chin and my R knee. My friend said I was only out for a few seconds, I didn't have any CP, SOB, palpitations or pain before it happened." On arrival to ED pt reports HA 8/10 with associated dizziness and lightheadedness. Pt also endorses nausea with 1 episode vomiting today, no blood, pt reports nausea and vomiting intermittent for a month. Pt also reporting 8/10 pain to surgical dihisence at [facility]. Pt with approx 5 cm open wound, fatty tissue visible, no bleeding at this time. Pt also reports white vaginal discharge for a month. Pt denies fevers, chills, CP, SOB, vaginal bleeding. Pt presents in NAD, though anxious, speaking full sentences ambulatory through triage. Pt upgraded to MD Raymundo. GYN at bedside at this time.

## 2017-10-10 NOTE — DISCHARGE NOTE ADULT - PATIENT PORTAL LINK FT
“You can access the [facility] Patient Portal, offered by [facility], by registering with the following website: [email or web address]”

## 2017-10-10 NOTE — ED PROVIDER NOTE - OBJECTIVE STATEMENT
38yo F s/p repair of incisional hernia 1/8/2017 c/b abdominal wall abscess s/p IR drain placement 2/7/2017 for which she went home on IV Abx on PICC, diagnosed w/ ectopic pregnancy yesterday and AMA'd, returns today for rhogham (RH neg), and now amenable to surgery. No new vaginal bleeding or increase in pain. Pt very worried. Has childcare only until midnight.

## 2017-10-10 NOTE — DISCHARGE NOTE ADULT - CARE PROVIDER_API CALL
Carroll Rivera), Obstetrics and Gynecology  [address] [place] [address]  Phone: [phone]  Fax: [phone]

## 2017-10-10 NOTE — H&P ADULT - HISTORY OF PRESENT ILLNESS
37y  @5w6d by TVUS with confirmed ectopic pregnancy 10/9 returns today with after leaving AMA yesterday. Pt states she is ready to have surgery for left ectopic pregnancy. Pt states she has some lower dull abdominal pain L>R, nausea, no vomiting.   Pt denies fever, chills, chest pain, SOB, vomiting, vaginal bleeding.     OB/GYN Hx:    C-sections x2   D&C    PMHx: HTN, DM, HLD, Obese  SHx: Ventral hernia repair 2017 s/p IR drainage of abdominal wall abscess 2017 s/ IV ABX on PICC, D&C, LEEP    Meds: Zofran, Macrobid for recent UTI   Allergies: Amoxicillin, Penicillin, Iodine, Shellfish     Social Hx: +Smoking (pack/day), Occasional alcohol, Denies IV drug abuse     PHYSICAL EXAM:   Vital Signs Last 24 Hrs  T(C): 36.7 (10 Oct 2017 16:47), Max: 36.9 (10 Oct 2017 15:04)  T(F): 98 (10 Oct 2017 16:47), Max: 98.4 (10 Oct 2017 15:04)  HR: 97 (10 Oct 2017 16:47) (93 - 98)  BP: 167/100 (10 Oct 2017 16:47) (134/84 - 167/100)  BP(mean): --  RR: 18 (10 Oct 2017 16:47) (18 - 18)  SpO2: 99% (10 Oct 2017 16:47) (99% - 99%)    **************************  Constitutional: No acute distress  Gastrointestinal: In pannus region-pfannensteil incision with small midline 1cm opening-no expression of an fluid. Mild erythema, no induration or fluctuance. Soft, nontender, nondistended, positive bowel sounds, no rebound or guarding   Extremities: no calf tenderness or swelling    LABS:                        13.0   12.1  )-----------( 395      ( 10 Oct 2017 15:38 )             40.2     10-10    140  |  99  |  7   ----------------------------<  165<H>  3.1<L>   |  22  |  0.70    Ca    9.6      10 Oct 2017 15:38    TPro  8.2  /  Alb  4.4  /  TBili  0.2  /  DBili  x   /  AST  23  /  ALT  16  /  AlkPhos  114  10-10    PT/INR - ( 10 Oct 2017 15:38 )   PT: 12.0 sec;   INR: 1.08          PTT - ( 10 Oct 2017 15:38 )  PTT:32.1 sec  Urinalysis Basic - ( 09 Oct 2017 19:04 )    Color: Yellow / Appearance: Clear / S.025 / pH: x  Gluc: x / Ketone: NEGATIVE  / Bili: Negative / Urobili: 0.2 E.U./dL   Blood: x / Protein: NEGATIVE mg/dL / Nitrite: NEGATIVE   Leuk Esterase: NEGATIVE / RBC: x / WBC x   Sq Epi: x / Non Sq Epi: x / Bacteria: x    HCG Quantitative, Serum: 00171.0 mIU/mL (10-10 @ 15:38)  HCG Quantitative, Serum: 18875.0 mIU/mL (10-09 @ 18:58)    RADIOLOGY & ADDITIONAL STUDIES:  EXAM:  US OB TRANSVAGINAL                          EXAM:  US OB LES THAN 14 WKS 1ST GEST                          PROCEDURE DATE:  10/09/2017         INTERPRETATION:    OBSTETRICAL ULTRASOUND - FIRST TRIMESTER dated 10/9/2017 9:05 PM    INDICATION: First trimester pregnancy with abnormal menstruation. LMP:   Unsure. Beta hCG 38093.    TECHNIQUE: Transabdominal views of the pelvis were obtained followed by   transvaginal views for better visualization of the endometrial cavity.      PRIOR STUDIES: Pelvic ultrasound 9/3/2017.    FINDINGS:     In the left adnexa, there is a 2.6 x 1.7 x 1.7 cm hyperechoic round   lesion with central hypodensity. This likely represents an ectopic   pregnancy. Mean sac diameter is 1.0 cm, corresponding to a gestational   age of 5 weeks and 5 days. Crown-rump length is not seen. A yolk sac is   not visualized.    The cervix is closed with a length of 3.7 cm.    The uterus is anteverted. The uterus is 9.0 x 4.1 x 4.5 cm.  No   myometrial abnormalities are seen. Nabothian cyst is seen.    The right ovary is normal in size, measuring 2.3 x 1.6 and 2.1 cm. 1.5   and 1.2 cm corpus luteal cyst are noted within the right ovary. The left   ovary is normal in size, measuring 2.4 x 1.7 x 2.0 cm. No left ovarian   masses are seen.     Doppler evaluation demonstrates flow to both ovaries with no evidence of   torsion.    IMPRESSION:   1.  No intrauterine gestation visualized.  2.  In the left adnexa, there is a 2.6 cm round hyperechoic lesion. Given   the lack of an intrauterine gestational sac and patient's beta hCG   levels, findings are concerning for an ectopic pregnancy. Further   evaluation by OB/GYN is recommended.    Findings discussed with Dr. Macario on 10/9/2017 9:48 PM with read back  verification.    "Thank you for the opportunity to participate in the care of this   patient."    TRINITY NAQVI M.D., RADIOLOGY RESIDENT  This document has been electronically signed.  MATTHIAS DELGADO M.D., ATTENDING RADIOLOGIST  This document has been electronically signed. Oct  9 2017 11:25PM      A/P: 37y  @5w6d by TVUS with confirmed ectopic pregnancy 10/9. Pt has agreed to surgery- laparoscopic salpingectomy, possible laparotomy. Pt understands risks of procedure and has consented to procedure. Pt's bHCG today is 11,406, yesterday was 12,852. Pt is O- s/p Rhogam 300mcg IM x1 dose. Pt discussed with Dr. Rivera. 37y  @5w6d by TVUS with confirmed ectopic pregnancy 10/9 returns today with after leaving AMA yesterday. Pt states she is ready to have surgery for left ectopic pregnancy. Pt states she has some lower dull abdominal pain L>R, nausea, no vomiting.   Pt denies fever, chills, chest pain, SOB, vomiting, vaginal bleeding.     OB/GYN Hx:    C-sections x2   D&C    PMHx: HTN, DM, HLD, Obese  SHx: Ventral hernia repair 2017 s/p IR drainage of abdominal wall abscess 2017 s/ IV ABX on PICC, D&C, LEEP    Meds: Zofran, Macrobid for recent UTI   Allergies: Amoxicillin, Penicillin, Iodine, Shellfish     Social Hx: +Smoking (pack/day), Occasional alcohol, Denies IV drug abuse     PHYSICAL EXAM:   Vital Signs Last 24 Hrs  T(C): 36.7 (10 Oct 2017 16:47), Max: 36.9 (10 Oct 2017 15:04)  T(F): 98 (10 Oct 2017 16:47), Max: 98.4 (10 Oct 2017 15:04)  HR: 97 (10 Oct 2017 16:47) (93 - 98)  BP: 167/100 (10 Oct 2017 16:47) (134/84 - 167/100)  BP(mean): --  RR: 18 (10 Oct 2017 16:47) (18 - 18)  SpO2: 99% (10 Oct 2017 16:47) (99% - 99%)    **************************  Constitutional: No acute distress  Gastrointestinal: In pannus region-pfannensteil incision with small midline 1cm opening-no expression of an fluid. Mild erythema, no induration or fluctuance. Soft, nontender, nondistended, positive bowel sounds, no rebound or guarding   Extremities: no calf tenderness or swelling    LABS:                        13.0   12.1  )-----------( 395      ( 10 Oct 2017 15:38 )             40.2     10-10    140  |  99  |  7   ----------------------------<  165<H>  3.1<L>   |  22  |  0.70    Ca    9.6      10 Oct 2017 15:38    TPro  8.2  /  Alb  4.4  /  TBili  0.2  /  DBili  x   /  AST  23  /  ALT  16  /  AlkPhos  114  10-10    PT/INR - ( 10 Oct 2017 15:38 )   PT: 12.0 sec;   INR: 1.08          PTT - ( 10 Oct 2017 15:38 )  PTT:32.1 sec  Urinalysis Basic - ( 09 Oct 2017 19:04 )    Color: Yellow / Appearance: Clear / S.025 / pH: x  Gluc: x / Ketone: NEGATIVE  / Bili: Negative / Urobili: 0.2 E.U./dL   Blood: x / Protein: NEGATIVE mg/dL / Nitrite: NEGATIVE   Leuk Esterase: NEGATIVE / RBC: x / WBC x   Sq Epi: x / Non Sq Epi: x / Bacteria: x    HCG Quantitative, Serum: 80893.0 mIU/mL (10-10 @ 15:38)  HCG Quantitative, Serum: 27889.0 mIU/mL (10-09 @ 18:58)    RADIOLOGY & ADDITIONAL STUDIES:  EXAM:  US OB TRANSVAGINAL                          EXAM:  US OB LES THAN 14 WKS 1ST GEST                          PROCEDURE DATE:  10/09/2017         INTERPRETATION:    OBSTETRICAL ULTRASOUND - FIRST TRIMESTER dated 10/9/2017 9:05 PM    INDICATION: First trimester pregnancy with abnormal menstruation. LMP:   Unsure. Beta hCG 04506.    TECHNIQUE: Transabdominal views of the pelvis were obtained followed by   transvaginal views for better visualization of the endometrial cavity.      PRIOR STUDIES: Pelvic ultrasound 9/3/2017.    FINDINGS:     In the left adnexa, there is a 2.6 x 1.7 x 1.7 cm hyperechoic round   lesion with central hypodensity. This likely represents an ectopic   pregnancy. Mean sac diameter is 1.0 cm, corresponding to a gestational   age of 5 weeks and 5 days. Crown-rump length is not seen. A yolk sac is   not visualized.    The cervix is closed with a length of 3.7 cm.    The uterus is anteverted. The uterus is 9.0 x 4.1 x 4.5 cm.  No   myometrial abnormalities are seen. Nabothian cyst is seen.    The right ovary is normal in size, measuring 2.3 x 1.6 and 2.1 cm. 1.5   and 1.2 cm corpus luteal cyst are noted within the right ovary. The left   ovary is normal in size, measuring 2.4 x 1.7 x 2.0 cm. No left ovarian   masses are seen.     Doppler evaluation demonstrates flow to both ovaries with no evidence of   torsion.    IMPRESSION:   1.  No intrauterine gestation visualized.  2.  In the left adnexa, there is a 2.6 cm round hyperechoic lesion. Given   the lack of an intrauterine gestational sac and patient's beta hCG   levels, findings are concerning for an ectopic pregnancy. Further   evaluation by OB/GYN is recommended.    Findings discussed with Dr. Macario on 10/9/2017 9:48 PM with read back  verification.    "Thank you for the opportunity to participate in the care of this   patient."    TRINITY NAQVI M.D., RADIOLOGY RESIDENT  This document has been electronically signed.  MATTHIAS DELGADO M.D., ATTENDING RADIOLOGIST  This document has been electronically signed. Oct  9 2017 11:25PM

## 2017-10-10 NOTE — ED ADULT TRIAGE NOTE - CHIEF COMPLAINT QUOTE
was here yesterday for shabbir at incision site and had an  US and found to have an ectopic pregnancy and called back to go to surgery

## 2017-10-10 NOTE — PROGRESS NOTE ADULT - ASSESSMENT
37F s/p repair of incisional hernia 1/8/2017 c/b abdominal wall abscess s/p IR drain placement 2/7/2017 for which she went home on IV Abx, now here with an ectopic pregnancy requiring emergent GYN surgery.  - Per  surgery for her abdominal incision they would continue local wound care and have the pt follow up with Dr. Hurtado.  - Pt refusing emergent laproscopic salpingectomy for left ectopic pregnancy  - Pt also refusing MTX with definite f.u in 4 days at this time  - All risks including rupturing ectopic with internal bleeding, risk of infection and death given to pt.   - She states she would like to return home to put her children on the bus and return tomorrow for surgery  - Pt signed AMA with understanding of all risks of leaving with a known ectopic pregnancy    d/w chief resident and Dr. Rivera 37F s/p repair of incisional hernia 1/8/2017 c/b abdominal wall abscess s/p IR drain placement 2/7/2017 for which she went home on IV Abx, now here with an ectopic pregnancy requiring emergent GYN surgery.  - Per  surgery for her abdominal incision they would continue local wound care and have the pt follow up with Dr. Hurtado.  - Patient is adamantly refusing surgical or medical management given that she does "not believe" the diagnosis of ectopic in that she feels fine ( outside of stomach condition, is not bleeding, has prior experience of daughter being initially dx as ectopic).  Will not consent for surgery or in house observation at this time given need to get children to school and arrange childcare.  Again, Pt offered, but refused emergent laproscopic salpingectomy for left ectopic pregnancy and inhouse observation.   - Pt also refusing MTX given "possiblity" that dx is wrong and may be viable pregnancy.   - All risks including rupturing ectopic with internal bleeding, risk of infection, syncope, and death reviewed in detail with patient.    - She states she would like to return home to put her children on the bus and return tomorrow for surgery. Patient asked to come in via ER and to be NPO.   - Explained to patient that given her extreme risk she would have to sign out AMA given the severe risks associated with ectopic and that compliance has notoriously been in consistent. Again, reviewed risks of leaving with a known ectopic pregnancy    d/w chief resident and Dr. Rivera

## 2017-10-10 NOTE — DISCHARGE NOTE ADULT - PLAN OF CARE
resume normal activity Follow up with Dr. Rivera in 2 weeks. Call to schedule your appointment. Regular diet. Tylenol or Motrin as needed for pain. Nothing in the vagina for 2 weeks--no sex, tampons, douching, or baths. Showers ok. Call your doctor if you have fever>101, severe pain, heavy bleeding. Dressing can come off in 48 hours.

## 2017-10-10 NOTE — H&P ADULT - ASSESSMENT
A/P: 37y  @5w6d by TVUS with confirmed ectopic pregnancy 10/9. Pt has agreed to surgery- laparoscopic salpingectomy, possible laparotomy. Pt understands risks of procedure and has consented to procedure. Pt's bHCG today is 11,406, yesterday was 12,852. Pt is O- s/p Rhogam 300mcg IM x1 dose. NPO, IVH.Pt discussed with Dr. Rivera.

## 2017-10-10 NOTE — ED POST DISCHARGE NOTE - ADDITIONAL DOCUMENTATION
d/w pt will need rohgam given ectopic pregnancy. pt left ama and states will return today for procedure as instructed by GYN.

## 2017-10-10 NOTE — DISCHARGE NOTE ADULT - CARE PLAN
Principal Discharge DX:	Ectopic pregnancy  Goal:	resume normal activity  Instructions for follow-up, activity and diet:	Follow up with Dr. Rivera in 2 weeks. Call to schedule your appointment. Regular diet. Tylenol or Motrin as needed for pain. Nothing in the vagina for 2 weeks--no sex, tampons, douching, or baths. Showers ok. Call your doctor if you have fever>101, severe pain, heavy bleeding. Dressing can come off in 48 hours.

## 2017-10-10 NOTE — ED PROVIDER NOTE - CARE PLAN
Principal Discharge DX:	Ectopic pregnancy without intrauterine pregnancy, unspecified location  Secondary Diagnosis:	Rh negative status during pregnancy in first trimester

## 2017-10-10 NOTE — PROVIDER CONTACT NOTE (MEDICATION) - ASSESSMENT
acute post op pain 10/10. Pt took Morphine 4mg IV b/c in severe pain. States btter than nothing and she needs something now

## 2017-10-10 NOTE — DISCHARGE NOTE ADULT - NS AS ACTIVITY OBS
Walking-Indoors allowed/No Heavy lifting/straining/Do not drive or operate machinery/Walking-Outdoors allowed/Showering allowed/Do not make important decisions
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
## 2017-10-10 NOTE — PROGRESS NOTE ADULT - ATTENDING COMMENTS
Clear communication regarding ectopic risks given to patient and she declined intervention multiple times.   Asked to have her sign out AMA given history of poor compliance and seriousness of condition.   Patient states that she will return tomorrow.  patient asked to return in early AM and NPO so that she can be booked. Advised that we do not know when her case will go based on add on schedule.    Will pass this on to day team for follow up.

## 2017-10-11 LAB
C TRACH RRNA SPEC QL NAA+PROBE: SIGNIFICANT CHANGE UP
N GONORRHOEA RRNA SPEC QL NAA+PROBE: SIGNIFICANT CHANGE UP
SPECIMEN SOURCE: SIGNIFICANT CHANGE UP

## 2017-10-11 PROCEDURE — 88305 TISSUE EXAM BY PATHOLOGIST: CPT

## 2017-10-11 PROCEDURE — C1889: CPT

## 2017-10-11 PROCEDURE — 86850 RBC ANTIBODY SCREEN: CPT

## 2017-10-11 PROCEDURE — 85730 THROMBOPLASTIN TIME PARTIAL: CPT

## 2017-10-11 PROCEDURE — 86901 BLOOD TYPING SEROLOGIC RH(D): CPT

## 2017-10-11 PROCEDURE — 80053 COMPREHEN METABOLIC PANEL: CPT

## 2017-10-11 PROCEDURE — 36415 COLL VENOUS BLD VENIPUNCTURE: CPT

## 2017-10-11 PROCEDURE — 85025 COMPLETE CBC W/AUTO DIFF WBC: CPT

## 2017-10-11 PROCEDURE — 84702 CHORIONIC GONADOTROPIN TEST: CPT

## 2017-10-11 PROCEDURE — 36430 TRANSFUSION BLD/BLD COMPNT: CPT

## 2017-10-11 PROCEDURE — 99285 EMERGENCY DEPT VISIT HI MDM: CPT | Mod: 25

## 2017-10-11 PROCEDURE — 85610 PROTHROMBIN TIME: CPT

## 2017-10-11 PROCEDURE — 86900 BLOOD TYPING SEROLOGIC ABO: CPT

## 2017-10-12 LAB
-  AMPICILLIN/SULBACTAM: SIGNIFICANT CHANGE UP
-  AMPICILLIN: SIGNIFICANT CHANGE UP
-  CEFAZOLIN: SIGNIFICANT CHANGE UP
-  CEFTRIAXONE: SIGNIFICANT CHANGE UP
-  CIPROFLOXACIN: SIGNIFICANT CHANGE UP
-  GENTAMICIN: SIGNIFICANT CHANGE UP
-  NITROFURANTOIN: SIGNIFICANT CHANGE UP
-  TOBRAMYCIN: SIGNIFICANT CHANGE UP
-  TRIMETHOPRIM/SULFAMETHOXAZOLE: SIGNIFICANT CHANGE UP
CULTURE RESULTS: SIGNIFICANT CHANGE UP
METHOD TYPE: SIGNIFICANT CHANGE UP
SPECIMEN SOURCE: SIGNIFICANT CHANGE UP

## 2017-10-13 DIAGNOSIS — Z98.890 OTHER SPECIFIED POSTPROCEDURAL STATES: ICD-10-CM

## 2017-10-13 DIAGNOSIS — R50.9 FEVER, UNSPECIFIED: ICD-10-CM

## 2017-10-13 DIAGNOSIS — N89.8 OTHER SPECIFIED NONINFLAMMATORY DISORDERS OF VAGINA: ICD-10-CM

## 2017-10-13 DIAGNOSIS — R10.9 UNSPECIFIED ABDOMINAL PAIN: ICD-10-CM

## 2017-10-13 DIAGNOSIS — R19.7 DIARRHEA, UNSPECIFIED: ICD-10-CM

## 2017-10-13 DIAGNOSIS — O00.90 UNSPECIFIED ECTOPIC PREGNANCY WITHOUT INTRAUTERINE PREGNANCY: ICD-10-CM

## 2017-10-13 DIAGNOSIS — Z88.1 ALLERGY STATUS TO OTHER ANTIBIOTIC AGENTS STATUS: ICD-10-CM

## 2017-10-13 DIAGNOSIS — E11.9 TYPE 2 DIABETES MELLITUS WITHOUT COMPLICATIONS: ICD-10-CM

## 2017-10-13 DIAGNOSIS — Z79.891 LONG TERM (CURRENT) USE OF OPIATE ANALGESIC: ICD-10-CM

## 2017-10-13 DIAGNOSIS — I10 ESSENTIAL (PRIMARY) HYPERTENSION: ICD-10-CM

## 2017-10-13 DIAGNOSIS — E78.5 HYPERLIPIDEMIA, UNSPECIFIED: ICD-10-CM

## 2017-10-13 DIAGNOSIS — Z88.0 ALLERGY STATUS TO PENICILLIN: ICD-10-CM

## 2017-10-13 DIAGNOSIS — Z3A.00 WEEKS OF GESTATION OF PREGNANCY NOT SPECIFIED: ICD-10-CM

## 2017-10-13 LAB
CULTURE RESULTS: SIGNIFICANT CHANGE UP
METHOD TYPE: SIGNIFICANT CHANGE UP
ORGANISM # SPEC MICROSCOPIC CNT: SIGNIFICANT CHANGE UP
SPECIMEN SOURCE: SIGNIFICANT CHANGE UP

## 2017-10-13 RX ORDER — MOXIFLOXACIN HYDROCHLORIDE TABLETS, 400 MG 400 MG/1
1 TABLET, FILM COATED ORAL
Qty: 20 | Refills: 0 | OUTPATIENT
Start: 2017-10-13 | End: 2017-10-23

## 2017-10-15 DIAGNOSIS — O00.90 UNSPECIFIED ECTOPIC PREGNANCY WITHOUT INTRAUTERINE PREGNANCY: ICD-10-CM

## 2017-10-15 DIAGNOSIS — E66.09 OTHER OBESITY DUE TO EXCESS CALORIES: ICD-10-CM

## 2017-10-15 DIAGNOSIS — E11.9 TYPE 2 DIABETES MELLITUS WITHOUT COMPLICATIONS: ICD-10-CM

## 2017-10-15 DIAGNOSIS — J45.909 UNSPECIFIED ASTHMA, UNCOMPLICATED: ICD-10-CM

## 2017-10-15 DIAGNOSIS — I10 ESSENTIAL (PRIMARY) HYPERTENSION: ICD-10-CM

## 2017-10-15 DIAGNOSIS — E78.5 HYPERLIPIDEMIA, UNSPECIFIED: ICD-10-CM

## 2017-10-15 DIAGNOSIS — F17.200 NICOTINE DEPENDENCE, UNSPECIFIED, UNCOMPLICATED: ICD-10-CM

## 2017-10-18 LAB — SURGICAL PATHOLOGY STUDY: SIGNIFICANT CHANGE UP

## 2017-12-15 PROCEDURE — 81003 URINALYSIS AUTO W/O SCOPE: CPT

## 2017-12-15 PROCEDURE — 85025 COMPLETE CBC W/AUTO DIFF WBC: CPT

## 2017-12-15 PROCEDURE — G0378: CPT

## 2017-12-15 PROCEDURE — 85610 PROTHROMBIN TIME: CPT

## 2017-12-15 PROCEDURE — 83605 ASSAY OF LACTIC ACID: CPT

## 2017-12-15 PROCEDURE — 80053 COMPREHEN METABOLIC PANEL: CPT

## 2017-12-15 PROCEDURE — 99285 EMERGENCY DEPT VISIT HI MDM: CPT | Mod: 25

## 2017-12-15 PROCEDURE — 96374 THER/PROPH/DIAG INJ IV PUSH: CPT

## 2017-12-15 PROCEDURE — 36415 COLL VENOUS BLD VENIPUNCTURE: CPT

## 2017-12-15 PROCEDURE — 87086 URINE CULTURE/COLONY COUNT: CPT

## 2017-12-15 PROCEDURE — 85730 THROMBOPLASTIN TIME PARTIAL: CPT

## 2017-12-15 PROCEDURE — 74176 CT ABD & PELVIS W/O CONTRAST: CPT

## 2018-02-20 NOTE — ED ADULT NURSE NOTE - PAIN: BODY LOCATION
CT chest, radiologist on past CT recommended with contrast. Order in [facility] is without contrast. Wanting to confirm [facility] want w/o or change order to include contrast. Pt has appt tomorrow for CT suprapubic, bilateral

## 2018-03-12 VITALS
RESPIRATION RATE: 18 BRPM | HEIGHT: 67 IN | OXYGEN SATURATION: 96 % | HEART RATE: 96 BPM | SYSTOLIC BLOOD PRESSURE: 179 MMHG | DIASTOLIC BLOOD PRESSURE: 95 MMHG | TEMPERATURE: 98 F

## 2018-03-12 LAB
ALBUMIN SERPL ELPH-MCNC: 4 G/DL — SIGNIFICANT CHANGE UP (ref 3.3–5)
ALP SERPL-CCNC: 113 U/L — SIGNIFICANT CHANGE UP (ref 40–120)
ALT FLD-CCNC: 16 U/L — SIGNIFICANT CHANGE UP (ref 10–45)
ANION GAP SERPL CALC-SCNC: 15 MMOL/L — SIGNIFICANT CHANGE UP (ref 5–17)
AST SERPL-CCNC: 21 U/L — SIGNIFICANT CHANGE UP (ref 10–40)
BASOPHILS NFR BLD AUTO: 0.2 % — SIGNIFICANT CHANGE UP (ref 0–2)
BILIRUB SERPL-MCNC: 0.2 MG/DL — SIGNIFICANT CHANGE UP (ref 0.2–1.2)
BUN SERPL-MCNC: 10 MG/DL — SIGNIFICANT CHANGE UP (ref 7–23)
CALCIUM SERPL-MCNC: 9.6 MG/DL — SIGNIFICANT CHANGE UP (ref 8.4–10.5)
CHLORIDE SERPL-SCNC: 101 MMOL/L — SIGNIFICANT CHANGE UP (ref 96–108)
CO2 SERPL-SCNC: 25 MMOL/L — SIGNIFICANT CHANGE UP (ref 22–31)
CREAT SERPL-MCNC: 0.71 MG/DL — SIGNIFICANT CHANGE UP (ref 0.5–1.3)
EOSINOPHIL NFR BLD AUTO: 1.2 % — SIGNIFICANT CHANGE UP (ref 0–6)
GLUCOSE SERPL-MCNC: 173 MG/DL — HIGH (ref 70–99)
HCT VFR BLD CALC: 41.4 % — SIGNIFICANT CHANGE UP (ref 34.5–45)
HGB BLD-MCNC: 13.2 G/DL — SIGNIFICANT CHANGE UP (ref 11.5–15.5)
LYMPHOCYTES # BLD AUTO: 37.2 % — SIGNIFICANT CHANGE UP (ref 13–44)
MCHC RBC-ENTMCNC: 25.1 PG — LOW (ref 27–34)
MCHC RBC-ENTMCNC: 31.9 G/DL — LOW (ref 32–36)
MCV RBC AUTO: 78.9 FL — LOW (ref 80–100)
MONOCYTES NFR BLD AUTO: 7.5 % — SIGNIFICANT CHANGE UP (ref 2–14)
NEUTROPHILS NFR BLD AUTO: 53.9 % — SIGNIFICANT CHANGE UP (ref 43–77)
PLATELET # BLD AUTO: 406 K/UL — HIGH (ref 150–400)
POTASSIUM SERPL-MCNC: 3.9 MMOL/L — SIGNIFICANT CHANGE UP (ref 3.5–5.3)
POTASSIUM SERPL-SCNC: 3.9 MMOL/L — SIGNIFICANT CHANGE UP (ref 3.5–5.3)
PROT SERPL-MCNC: 7.8 G/DL — SIGNIFICANT CHANGE UP (ref 6–8.3)
RBC # BLD: 5.25 M/UL — HIGH (ref 3.8–5.2)
RBC # FLD: 17.2 % — HIGH (ref 10.3–16.9)
SODIUM SERPL-SCNC: 141 MMOL/L — SIGNIFICANT CHANGE UP (ref 135–145)
WBC # BLD: 11.6 K/UL — HIGH (ref 3.8–10.5)
WBC # FLD AUTO: 11.6 K/UL — HIGH (ref 3.8–10.5)

## 2018-03-12 PROCEDURE — 99285 EMERGENCY DEPT VISIT HI MDM: CPT

## 2018-03-12 RX ORDER — HYDROMORPHONE HYDROCHLORIDE 2 MG/ML
0.5 INJECTION INTRAMUSCULAR; INTRAVENOUS; SUBCUTANEOUS ONCE
Qty: 0 | Refills: 0 | Status: DISCONTINUED | OUTPATIENT
Start: 2018-03-12 | End: 2018-03-12

## 2018-03-12 RX ORDER — IOHEXOL 300 MG/ML
50 INJECTION, SOLUTION INTRAVENOUS ONCE
Qty: 0 | Refills: 0 | Status: COMPLETED | OUTPATIENT
Start: 2018-03-12 | End: 2018-03-12

## 2018-03-12 RX ORDER — ONDANSETRON 8 MG/1
4 TABLET, FILM COATED ORAL ONCE
Qty: 0 | Refills: 0 | Status: COMPLETED | OUTPATIENT
Start: 2018-03-12 | End: 2018-03-12

## 2018-03-12 RX ORDER — HYDROMORPHONE HYDROCHLORIDE 2 MG/ML
1 INJECTION INTRAMUSCULAR; INTRAVENOUS; SUBCUTANEOUS ONCE
Qty: 0 | Refills: 0 | Status: DISCONTINUED | OUTPATIENT
Start: 2018-03-12 | End: 2018-03-12

## 2018-03-12 RX ORDER — KETOROLAC TROMETHAMINE 30 MG/ML
15 SYRINGE (ML) INJECTION ONCE
Qty: 0 | Refills: 0 | Status: DISCONTINUED | OUTPATIENT
Start: 2018-03-12 | End: 2018-03-12

## 2018-03-12 RX ADMIN — HYDROMORPHONE HYDROCHLORIDE 0.5 MILLIGRAM(S): 2 INJECTION INTRAMUSCULAR; INTRAVENOUS; SUBCUTANEOUS at 22:02

## 2018-03-12 RX ADMIN — Medication 15 MILLIGRAM(S): at 22:02

## 2018-03-12 RX ADMIN — HYDROMORPHONE HYDROCHLORIDE 0.5 MILLIGRAM(S): 2 INJECTION INTRAMUSCULAR; INTRAVENOUS; SUBCUTANEOUS at 22:44

## 2018-03-12 RX ADMIN — Medication 15 MILLIGRAM(S): at 21:16

## 2018-03-12 RX ADMIN — IOHEXOL 50 MILLILITER(S): 300 INJECTION, SOLUTION INTRAVENOUS at 22:44

## 2018-03-12 RX ADMIN — HYDROMORPHONE HYDROCHLORIDE 1 MILLIGRAM(S): 2 INJECTION INTRAMUSCULAR; INTRAVENOUS; SUBCUTANEOUS at 23:23

## 2018-03-12 RX ADMIN — ONDANSETRON 4 MILLIGRAM(S): 8 TABLET, FILM COATED ORAL at 23:22

## 2018-03-12 NOTE — ED PROVIDER NOTE - OBJECTIVE STATEMENT
37 yo F with pmh of repair of incisional hernia 1/8/2017 c/b abdominal wall abscess s/p IR drain placement 2/7/2017 for which she went home on IV Abx, with chronically draining sinus above her 8x1cm2 mesh placed for her incisional hernia repair, s/p ectopic pregnancy 10/2017 s/p L salpinectomy c/o open wound to lower abd since Oct. Pt states after she had the ectopic pregnancy surgery the following day she had an open wound to her lower abdomen. Pt states it has been open ever since. Initially was draining clear liquid and now draining green. Also c/o R toothache x 3 days. Admits to fever of 100.3 2 days ago. Denies n/v, dysuria, hematuria.

## 2018-03-12 NOTE — ED PROVIDER NOTE - MEDICAL DECISION MAKING DETAILS
37 yo F with pmh of repair of incisional hernia 1/8/2017 c/b abdominal wall abscess s/p IR drain placement 2/7/2017 for which she went home on IV Abx, with chronically draining sinus above her 8x1cm2 mesh placed for her incisional hernia repair, s/p ectopic pregnancy 10/2017 s/p L salpinectomy c/o open wound to lower abd since Oct. ALso c/o toothache x 3 days. Abd with +dime sized open wound to L lower abd over hernia scar, no active drainage, no fluctuance. Ex-lap scars from salpingectomy well healed. +multiple dental caries, no signs of dental abscess. 39 yo F with pmh of repair of incisional hernia 1/8/2017 by Dr. Hurtado, c/b abdominal wall abscess s/p IR drain placement 2/7/2017 for which she went home on IV Abx, with chronically draining sinus above her 8x1cm2 mesh placed for her incisional hernia repair, s/p ectopic pregnancy 10/2017 s/p L salpinectomy c/o open wound to lower abd since Oct. ALso c/o toothache x 3 days. Abd with +dime sized open wound to L lower abd over hernia scar, no active drainage, no fluctuance. Ex-lap scars from salpingectomy well healed. +multiple dental caries, no signs of dental abscess. Will consult surgery.

## 2018-03-12 NOTE — ED ADULT TRIAGE NOTE - CHIEF COMPLAINT QUOTE
PT requesting Wound check and CO Toothache x3 days.  Pt states "I had a procedure on October of last year and I feel like the wound just isn't closing properly and also my tooth has been killing me."  Pt denies N/V/D, SOB, fevers and CP

## 2018-03-12 NOTE — ED PROVIDER NOTE - PROGRESS NOTE DETAILS
Director - pt received from Dr Levi pending ct and final surg recommendations for ? wound infection.  WBC mildly elevated, ct pending. Director - pt tba to Dr Hurtado.

## 2018-03-12 NOTE — CONSULT NOTE ADULT - SUBJECTIVE AND OBJECTIVE BOX
HPI:  37 yo female with hx of HTN, DM, HLD, MO, prior ventral hernia repair with Dr. Hurtado (2017), complicated by wound infection requiring IR drainage (2017), also s/p laparoscopic salpingectomy on 10/2017 2/2 ectopic pregnancy. Patient today comes because of tooth pain and some chronic abdominal pain and drainage from prior lower abdomen incision site. Patient reports she first noticed drainage in 2017, never followed up with surgeon/physician for that reason. She also reports intermittent fevers, chills, nausea, emesis, poor appetite. Reports drainage is usually serous sometimes bloody or greenish. has no other complaints.     PAST MEDICAL & SURGICAL HISTORY:  Abdominal hernia  Obesity  Diabetes  Hyperlipidemia  Essential hypertension  H/O ventral hernia repair  H/O:   History of D&C  H/O LEEP      ROS: See HPI    MEDICATIONS:  ALLERGIES:    SOCIAL HISTORY:  Smoke: Never Smoker  EtOH: occasional    Vital Signs Last 24 Hrs  T(C): 36.6 (12 Mar 2018 19:59), Max: 36.6 (12 Mar 2018 19:59)  T(F): 97.9 (12 Mar 2018 19:59), Max: 97.9 (12 Mar 2018 19:59)  HR: 96 (12 Mar 2018 19:59) (96 - 96)  BP: 179/95 (12 Mar 2018 19:59) (179/95 - 179/95)  BP(mean): --  RR: 18 (12 Mar 2018 19:59) (18 - 18)  SpO2: 96% (12 Mar 2018 19:59) (96% - 96%)    PHYSICAL EXAM  Gen: NAD  CV: RRR, hypertensive  Pulm: CTAB, non-labored breathing on RA  Abd: Soft, obese, non-distended, midly tender to palpation in lower abdomen around incision site, small 1cm opening within prior incision site draining scant amount serosang fluid, no surrounding erythema or edema, area tender to palpation  Extremities: P    LABS:    -    141  |  101  |  10  ----------------------------<  173<H>  3.9   |  25  |  0.71    Ca    9.6      12 Mar 2018 21:02    TPro  7.8  /  Alb  4.0  /  TBili  0.2  /  DBili  x   /  AST  21  /  ALT  16  /  AlkPhos  113  03-12          RADIOLOGY & ADDITIONAL STUDIES:    ASSESSMENT AND PLAN:  37 yo female with hx of ventral hernia repair 2017 complicated by wound infection that required IR drainage 2017, presents with chronic drainage from the wound since 2017.  Patient has never followed up with surgeon as outpatient.      - please obtain CT abdomen/pelvis   - labs pending   - discussed with chief resident

## 2018-03-12 NOTE — ED PROVIDER NOTE - ATTENDING CONTRIBUTION TO CARE
pt seen and examined by me.  39 yo female co non healing area of abd surgical incision from hernia repair done years ago.  on exam, area in lower mid abd of wound opening 2x2 cm over mid hernia incision scar.  tender over that area, ow abd nt.  surgery saw patient and want ct scan and then will dispo

## 2018-03-12 NOTE — ED PROVIDER NOTE - PHYSICAL EXAMINATION
CONSTITUTIONAL: Well-appearing; well-nourished; in no apparent distress.   HEAD: Normocephalic; atraumatic.   EYES: PERRL; EOM intact; conjunctiva and sclera clear  ENT: normal nose; no rhinorrhea; normal pharynx with no erythema or lesions. R upper and lower molars with dental caries, no gum swelling or fluctuance  NECK: Supple; non-tender; no LAD  CARDIOVASCULAR: Normal S1, S2; no murmurs, rubs, or gallops. Regular rate and rhythm.   RESPIRATORY: Breathing easily; breath sounds clear and equal bilaterally; no wheezes, rhonchi, or rales.  GI: Soft; non-distended; non-tender; no palpable organomegaly. +dime sized open wound to L lower abd over hernia scar, no active drainage, no fluctuance. Ex-lap scars from salpingectomy well healed.   MSK: FROM at all extremities, normal tone   EXT: No cyanosis or edema; N/V intact  SKIN: Normal for age and race; warm; dry; good turgor; no apparent lesions or rash.   NEURO: A & O x 3; face symmetric; grossly unremarkable.   PSYCHOLOGICAL: The patient’s mood and manner are appropriate.

## 2018-03-13 ENCOUNTER — INPATIENT (INPATIENT)
Facility: HOSPITAL | Age: 38
LOS: 7 days | Discharge: HOME HEALTH | DRG: 902 | End: 2018-03-21
Attending: SURGERY | Admitting: SURGERY
Payer: COMMERCIAL

## 2018-03-13 DIAGNOSIS — Z98.89 OTHER SPECIFIED POSTPROCEDURAL STATES: Chronic | ICD-10-CM

## 2018-03-13 DIAGNOSIS — Z98.890 OTHER SPECIFIED POSTPROCEDURAL STATES: Chronic | ICD-10-CM

## 2018-03-13 LAB
ANION GAP SERPL CALC-SCNC: 13 MMOL/L — SIGNIFICANT CHANGE UP (ref 5–17)
APPEARANCE UR: CLEAR — SIGNIFICANT CHANGE UP
APTT BLD: 30.4 SEC — SIGNIFICANT CHANGE UP (ref 27.5–37.4)
BILIRUB UR-MCNC: NEGATIVE — SIGNIFICANT CHANGE UP
BLD GP AB SCN SERPL QL: NEGATIVE — SIGNIFICANT CHANGE UP
BUN SERPL-MCNC: 11 MG/DL — SIGNIFICANT CHANGE UP (ref 7–23)
CALCIUM SERPL-MCNC: 9.5 MG/DL — SIGNIFICANT CHANGE UP (ref 8.4–10.5)
CHLORIDE SERPL-SCNC: 100 MMOL/L — SIGNIFICANT CHANGE UP (ref 96–108)
CO2 SERPL-SCNC: 25 MMOL/L — SIGNIFICANT CHANGE UP (ref 22–31)
COLOR SPEC: YELLOW — SIGNIFICANT CHANGE UP
CREAT SERPL-MCNC: 0.74 MG/DL — SIGNIFICANT CHANGE UP (ref 0.5–1.3)
DIFF PNL FLD: NEGATIVE — SIGNIFICANT CHANGE UP
GLUCOSE BLDC GLUCOMTR-MCNC: 109 MG/DL — HIGH (ref 70–99)
GLUCOSE BLDC GLUCOMTR-MCNC: 170 MG/DL — HIGH (ref 70–99)
GLUCOSE BLDC GLUCOMTR-MCNC: 180 MG/DL — HIGH (ref 70–99)
GLUCOSE BLDC GLUCOMTR-MCNC: 188 MG/DL — HIGH (ref 70–99)
GLUCOSE BLDC GLUCOMTR-MCNC: 198 MG/DL — HIGH (ref 70–99)
GLUCOSE SERPL-MCNC: 166 MG/DL — HIGH (ref 70–99)
GLUCOSE UR QL: NEGATIVE — SIGNIFICANT CHANGE UP
HCG SERPL-ACNC: <.1 MIU/ML — SIGNIFICANT CHANGE UP
HCT VFR BLD CALC: 42.4 % — SIGNIFICANT CHANGE UP (ref 34.5–45)
HGB BLD-MCNC: 13.6 G/DL — SIGNIFICANT CHANGE UP (ref 11.5–15.5)
INR BLD: 1.14 — SIGNIFICANT CHANGE UP (ref 0.88–1.16)
KETONES UR-MCNC: NEGATIVE — SIGNIFICANT CHANGE UP
LEUKOCYTE ESTERASE UR-ACNC: NEGATIVE — SIGNIFICANT CHANGE UP
MAGNESIUM SERPL-MCNC: 2.1 MG/DL — SIGNIFICANT CHANGE UP (ref 1.6–2.6)
MCHC RBC-ENTMCNC: 25.6 PG — LOW (ref 27–34)
MCHC RBC-ENTMCNC: 32.1 G/DL — SIGNIFICANT CHANGE UP (ref 32–36)
MCV RBC AUTO: 79.8 FL — LOW (ref 80–100)
NITRITE UR-MCNC: NEGATIVE — SIGNIFICANT CHANGE UP
PH UR: 6 — SIGNIFICANT CHANGE UP (ref 5–8)
PHOSPHATE SERPL-MCNC: 3.3 MG/DL — SIGNIFICANT CHANGE UP (ref 2.5–4.5)
PLATELET # BLD AUTO: 446 K/UL — HIGH (ref 150–400)
POTASSIUM SERPL-MCNC: 4 MMOL/L — SIGNIFICANT CHANGE UP (ref 3.5–5.3)
POTASSIUM SERPL-SCNC: 4 MMOL/L — SIGNIFICANT CHANGE UP (ref 3.5–5.3)
PROT UR-MCNC: NEGATIVE MG/DL — SIGNIFICANT CHANGE UP
PROTHROM AB SERPL-ACNC: 12.7 SEC — SIGNIFICANT CHANGE UP (ref 9.8–12.7)
RBC # BLD: 5.31 M/UL — HIGH (ref 3.8–5.2)
RBC # FLD: 17.7 % — HIGH (ref 10.3–16.9)
RH IG SCN BLD-IMP: NEGATIVE — SIGNIFICANT CHANGE UP
SODIUM SERPL-SCNC: 138 MMOL/L — SIGNIFICANT CHANGE UP (ref 135–145)
SP GR SPEC: 1.02 — SIGNIFICANT CHANGE UP (ref 1–1.03)
UROBILINOGEN FLD QL: 0.2 E.U./DL — SIGNIFICANT CHANGE UP
WBC # BLD: 13.9 K/UL — HIGH (ref 3.8–10.5)
WBC # FLD AUTO: 13.9 K/UL — HIGH (ref 3.8–10.5)

## 2018-03-13 PROCEDURE — 93010 ELECTROCARDIOGRAM REPORT: CPT

## 2018-03-13 PROCEDURE — 71045 X-RAY EXAM CHEST 1 VIEW: CPT | Mod: 26

## 2018-03-13 PROCEDURE — 74176 CT ABD & PELVIS W/O CONTRAST: CPT | Mod: 26

## 2018-03-13 RX ORDER — SODIUM CHLORIDE 9 MG/ML
1000 INJECTION, SOLUTION INTRAVENOUS
Qty: 0 | Refills: 0 | Status: DISCONTINUED | OUTPATIENT
Start: 2018-03-13 | End: 2018-03-14

## 2018-03-13 RX ORDER — OXYCODONE AND ACETAMINOPHEN 5; 325 MG/1; MG/1
1 TABLET ORAL EVERY 4 HOURS
Qty: 0 | Refills: 0 | Status: DISCONTINUED | OUTPATIENT
Start: 2018-03-13 | End: 2018-03-13

## 2018-03-13 RX ORDER — ONDANSETRON 8 MG/1
4 TABLET, FILM COATED ORAL EVERY 6 HOURS
Qty: 0 | Refills: 0 | Status: DISCONTINUED | OUTPATIENT
Start: 2018-03-13 | End: 2018-03-14

## 2018-03-13 RX ORDER — HEPARIN SODIUM 5000 [USP'U]/ML
5000 INJECTION INTRAVENOUS; SUBCUTANEOUS EVERY 8 HOURS
Qty: 0 | Refills: 0 | Status: DISCONTINUED | OUTPATIENT
Start: 2018-03-13 | End: 2018-03-13

## 2018-03-13 RX ORDER — INSULIN LISPRO 100/ML
VIAL (ML) SUBCUTANEOUS
Qty: 0 | Refills: 0 | Status: DISCONTINUED | OUTPATIENT
Start: 2018-03-13 | End: 2018-03-14

## 2018-03-13 RX ORDER — OXYCODONE AND ACETAMINOPHEN 5; 325 MG/1; MG/1
2 TABLET ORAL EVERY 4 HOURS
Qty: 0 | Refills: 0 | Status: DISCONTINUED | OUTPATIENT
Start: 2018-03-13 | End: 2018-03-13

## 2018-03-13 RX ORDER — DIPHENHYDRAMINE HCL 50 MG
25 CAPSULE ORAL ONCE
Qty: 0 | Refills: 0 | Status: COMPLETED | OUTPATIENT
Start: 2018-03-13 | End: 2018-03-13

## 2018-03-13 RX ORDER — HYDROMORPHONE HYDROCHLORIDE 2 MG/ML
0.5 INJECTION INTRAMUSCULAR; INTRAVENOUS; SUBCUTANEOUS EVERY 4 HOURS
Qty: 0 | Refills: 0 | Status: DISCONTINUED | OUTPATIENT
Start: 2018-03-13 | End: 2018-03-13

## 2018-03-13 RX ORDER — HYDROMORPHONE HYDROCHLORIDE 2 MG/ML
1 INJECTION INTRAMUSCULAR; INTRAVENOUS; SUBCUTANEOUS EVERY 4 HOURS
Qty: 0 | Refills: 0 | Status: DISCONTINUED | OUTPATIENT
Start: 2018-03-13 | End: 2018-03-14

## 2018-03-13 RX ORDER — HEPARIN SODIUM 5000 [USP'U]/ML
7500 INJECTION INTRAVENOUS; SUBCUTANEOUS EVERY 8 HOURS
Qty: 0 | Refills: 0 | Status: DISCONTINUED | OUTPATIENT
Start: 2018-03-13 | End: 2018-03-14

## 2018-03-13 RX ORDER — ACETAMINOPHEN 500 MG
1000 TABLET ORAL ONCE
Qty: 0 | Refills: 0 | Status: COMPLETED | OUTPATIENT
Start: 2018-03-13 | End: 2018-03-13

## 2018-03-13 RX ORDER — HYDROMORPHONE HYDROCHLORIDE 2 MG/ML
0.5 INJECTION INTRAMUSCULAR; INTRAVENOUS; SUBCUTANEOUS EVERY 4 HOURS
Qty: 0 | Refills: 0 | Status: DISCONTINUED | OUTPATIENT
Start: 2018-03-13 | End: 2018-03-14

## 2018-03-13 RX ORDER — ONDANSETRON 8 MG/1
4 TABLET, FILM COATED ORAL ONCE
Qty: 0 | Refills: 0 | Status: COMPLETED | OUTPATIENT
Start: 2018-03-13 | End: 2018-03-13

## 2018-03-13 RX ORDER — SCOPALAMINE 1 MG/3D
1 PATCH, EXTENDED RELEASE TRANSDERMAL
Qty: 0 | Refills: 0 | Status: DISCONTINUED | OUTPATIENT
Start: 2018-03-13 | End: 2018-03-20

## 2018-03-13 RX ADMIN — OXYCODONE AND ACETAMINOPHEN 2 TABLET(S): 5; 325 TABLET ORAL at 06:28

## 2018-03-13 RX ADMIN — Medication 2: at 12:51

## 2018-03-13 RX ADMIN — Medication 2: at 07:13

## 2018-03-13 RX ADMIN — HYDROMORPHONE HYDROCHLORIDE 0.5 MILLIGRAM(S): 2 INJECTION INTRAMUSCULAR; INTRAVENOUS; SUBCUTANEOUS at 08:20

## 2018-03-13 RX ADMIN — HEPARIN SODIUM 7500 UNIT(S): 5000 INJECTION INTRAVENOUS; SUBCUTANEOUS at 22:46

## 2018-03-13 RX ADMIN — OXYCODONE AND ACETAMINOPHEN 2 TABLET(S): 5; 325 TABLET ORAL at 14:22

## 2018-03-13 RX ADMIN — ONDANSETRON 4 MILLIGRAM(S): 8 TABLET, FILM COATED ORAL at 08:46

## 2018-03-13 RX ADMIN — ONDANSETRON 4 MILLIGRAM(S): 8 TABLET, FILM COATED ORAL at 22:46

## 2018-03-13 RX ADMIN — Medication 2: at 22:57

## 2018-03-13 RX ADMIN — HYDROMORPHONE HYDROCHLORIDE 1 MILLIGRAM(S): 2 INJECTION INTRAMUSCULAR; INTRAVENOUS; SUBCUTANEOUS at 01:38

## 2018-03-13 RX ADMIN — HYDROMORPHONE HYDROCHLORIDE 0.5 MILLIGRAM(S): 2 INJECTION INTRAMUSCULAR; INTRAVENOUS; SUBCUTANEOUS at 23:46

## 2018-03-13 RX ADMIN — SCOPALAMINE 1 PATCH: 1 PATCH, EXTENDED RELEASE TRANSDERMAL at 10:16

## 2018-03-13 RX ADMIN — OXYCODONE AND ACETAMINOPHEN 2 TABLET(S): 5; 325 TABLET ORAL at 07:30

## 2018-03-13 RX ADMIN — HYDROMORPHONE HYDROCHLORIDE 0.5 MILLIGRAM(S): 2 INJECTION INTRAMUSCULAR; INTRAVENOUS; SUBCUTANEOUS at 01:38

## 2018-03-13 RX ADMIN — OXYCODONE AND ACETAMINOPHEN 2 TABLET(S): 5; 325 TABLET ORAL at 21:07

## 2018-03-13 RX ADMIN — HYDROMORPHONE HYDROCHLORIDE 0.5 MILLIGRAM(S): 2 INJECTION INTRAMUSCULAR; INTRAVENOUS; SUBCUTANEOUS at 22:47

## 2018-03-13 RX ADMIN — HYDROMORPHONE HYDROCHLORIDE 0.5 MILLIGRAM(S): 2 INJECTION INTRAMUSCULAR; INTRAVENOUS; SUBCUTANEOUS at 08:08

## 2018-03-13 RX ADMIN — OXYCODONE AND ACETAMINOPHEN 2 TABLET(S): 5; 325 TABLET ORAL at 22:00

## 2018-03-13 RX ADMIN — ONDANSETRON 4 MILLIGRAM(S): 8 TABLET, FILM COATED ORAL at 01:43

## 2018-03-13 RX ADMIN — OXYCODONE AND ACETAMINOPHEN 2 TABLET(S): 5; 325 TABLET ORAL at 13:22

## 2018-03-13 NOTE — H&P ADULT - NSHPLABSRESULTS_GEN_ALL_CORE
13.2   11.6  )-----------( 406      ( 12 Mar 2018 22:04 )             41.4   03-12    141  |  101  |  10  ----------------------------<  173<H>  3.9   |  25  |  0.71    Ca    9.6      12 Mar 2018 21:02    TPro  7.8  /  Alb  4.0  /  TBili  0.2  /  DBili  x   /  AST  21  /  ALT  16  /  AlkPhos  113  03-12  < from: CT Abdomen and Pelvis w/ Oral Cont (03.13.18 @ 02:12) >    IMPRESSION:     9 x 2 x 1 cm soft tissue structure in the lower ventral subcutaneous   tissues, similar in size since 3/16/2017, which could represent a   residual fluid collection and/or granulation tissue. This appears   contiguous with the herniorrhaphy mesh. New soft tissue around the mesh   may represent fluid or granulation tissue. New thin soft tissue tract   extends from the soft tissue structure in the ventral subcutaneous   tissues to the skin in the pannus fold.      < end of copied text >

## 2018-03-13 NOTE — H&P ADULT - ASSESSMENT
39 yo female with hx of ventral hernia repair 1/2017 complicated by wound infection that required IR drainage 2/2017, presents with chronic drainage from the wound since October 2017. Patient has never followed up with surgeon as outpatient.     Admit to general surgery, regional  No abx at this time  DM diet  ISS  pain control  Wound care  Dr. Hurtado to evaluate in AM  SQH/SCDs/OOBA/IS

## 2018-03-13 NOTE — H&P ADULT - NSHPPHYSICALEXAM_GEN_ALL_CORE
Gen: NAD  CV: RRR, hypertensive  Pulm: CTAB, non-labored breathing on RA  Abd: Soft, obese, non-distended, midly tender to palpation in lower abdomen around incision site, small 1cm opening within prior incision site draining scant amount serosang fluid, no surrounding erythema or edema, area tender to palpation  Extremities: WWP

## 2018-03-13 NOTE — H&P ADULT - HISTORY OF PRESENT ILLNESS
39 yo female with hx of HTN, DM, HLD, MO, prior ventral hernia repair with Dr. Hurtado (1/2017), complicated by wound infection requiring IR drainage (2/2017), also s/p laparoscopic salpingectomy on 10/2017 2/2 ectopic pregnancy. Patient today comes because of tooth pain and some chronic abdominal pain and drainage from prior lower abdomen incision site. Patient reports she first noticed drainage in october 2017, never followed up with surgeon/physician for that reason. She also reports intermittent fevers, chills, nausea, emesis, poor appetite. Reports drainage is usually serous sometimes bloody or greenish. has no other complaints.

## 2018-03-14 ENCOUNTER — RESULT REVIEW (OUTPATIENT)
Age: 38
End: 2018-03-14

## 2018-03-14 LAB
ANION GAP SERPL CALC-SCNC: 13 MMOL/L — SIGNIFICANT CHANGE UP (ref 5–17)
BLD GP AB SCN SERPL QL: NEGATIVE — SIGNIFICANT CHANGE UP
BUN SERPL-MCNC: 12 MG/DL — SIGNIFICANT CHANGE UP (ref 7–23)
CALCIUM SERPL-MCNC: 9.2 MG/DL — SIGNIFICANT CHANGE UP (ref 8.4–10.5)
CHLORIDE SERPL-SCNC: 101 MMOL/L — SIGNIFICANT CHANGE UP (ref 96–108)
CO2 SERPL-SCNC: 28 MMOL/L — SIGNIFICANT CHANGE UP (ref 22–31)
CREAT SERPL-MCNC: 0.96 MG/DL — SIGNIFICANT CHANGE UP (ref 0.5–1.3)
GLUCOSE BLDC GLUCOMTR-MCNC: 117 MG/DL — HIGH (ref 70–99)
GLUCOSE BLDC GLUCOMTR-MCNC: 119 MG/DL — HIGH (ref 70–99)
GLUCOSE BLDC GLUCOMTR-MCNC: 135 MG/DL — HIGH (ref 70–99)
GLUCOSE SERPL-MCNC: 129 MG/DL — HIGH (ref 70–99)
GRAM STN FLD: SIGNIFICANT CHANGE UP
HCT VFR BLD CALC: 42.7 % — SIGNIFICANT CHANGE UP (ref 34.5–45)
HGB BLD-MCNC: 13.4 G/DL — SIGNIFICANT CHANGE UP (ref 11.5–15.5)
MAGNESIUM SERPL-MCNC: 2 MG/DL — SIGNIFICANT CHANGE UP (ref 1.6–2.6)
MCHC RBC-ENTMCNC: 25.3 PG — LOW (ref 27–34)
MCHC RBC-ENTMCNC: 31.4 G/DL — LOW (ref 32–36)
MCV RBC AUTO: 80.6 FL — SIGNIFICANT CHANGE UP (ref 80–100)
PHOSPHATE SERPL-MCNC: 3.8 MG/DL — SIGNIFICANT CHANGE UP (ref 2.5–4.5)
PLATELET # BLD AUTO: 473 K/UL — HIGH (ref 150–400)
POTASSIUM SERPL-MCNC: 3.7 MMOL/L — SIGNIFICANT CHANGE UP (ref 3.5–5.3)
POTASSIUM SERPL-SCNC: 3.7 MMOL/L — SIGNIFICANT CHANGE UP (ref 3.5–5.3)
RBC # BLD: 5.3 M/UL — HIGH (ref 3.8–5.2)
RBC # FLD: 17.6 % — HIGH (ref 10.3–16.9)
RH IG SCN BLD-IMP: NEGATIVE — SIGNIFICANT CHANGE UP
SODIUM SERPL-SCNC: 142 MMOL/L — SIGNIFICANT CHANGE UP (ref 135–145)
SPECIMEN SOURCE: SIGNIFICANT CHANGE UP
WBC # BLD: 10.9 K/UL — HIGH (ref 3.8–10.5)
WBC # FLD AUTO: 10.9 K/UL — HIGH (ref 3.8–10.5)

## 2018-03-14 PROCEDURE — 49565: CPT | Mod: 80

## 2018-03-14 PROCEDURE — 15734 MUSCLE-SKIN GRAFT TRUNK: CPT | Mod: 80,59,RT

## 2018-03-14 PROCEDURE — 49020 DRAINAGE ABDOM ABSCESS OPEN: CPT | Mod: 80

## 2018-03-14 PROCEDURE — 15777 ACELLULAR DERM MATRIX IMPLT: CPT | Mod: 59

## 2018-03-14 PROCEDURE — 49568: CPT | Mod: 80

## 2018-03-14 PROCEDURE — 97606 NEG PRS WND THER DME>50 SQCM: CPT | Mod: 80

## 2018-03-14 PROCEDURE — 97605 NEG PRS WND THER DME<=50SQCM: CPT | Mod: 80

## 2018-03-14 PROCEDURE — 11005 DBRDMT SKIN ABDOMINAL WALL: CPT | Mod: 80,59

## 2018-03-14 PROCEDURE — 15002 WOUND PREP TRK/ARM/LEG: CPT | Mod: 80

## 2018-03-14 RX ORDER — INSULIN LISPRO 100/ML
VIAL (ML) SUBCUTANEOUS
Qty: 0 | Refills: 0 | Status: DISCONTINUED | OUTPATIENT
Start: 2018-03-14 | End: 2018-03-20

## 2018-03-14 RX ORDER — BUPIVACAINE 13.3 MG/ML
20 INJECTION, SUSPENSION, LIPOSOMAL INFILTRATION ONCE
Qty: 0 | Refills: 0 | Status: DISCONTINUED | OUTPATIENT
Start: 2018-03-14 | End: 2018-03-17

## 2018-03-14 RX ORDER — POTASSIUM CHLORIDE 20 MEQ
10 PACKET (EA) ORAL
Qty: 0 | Refills: 0 | Status: DISCONTINUED | OUTPATIENT
Start: 2018-03-14 | End: 2018-03-16

## 2018-03-14 RX ORDER — ONDANSETRON 8 MG/1
4 TABLET, FILM COATED ORAL EVERY 6 HOURS
Qty: 0 | Refills: 0 | Status: DISCONTINUED | OUTPATIENT
Start: 2018-03-14 | End: 2018-03-20

## 2018-03-14 RX ORDER — HYDROMORPHONE HYDROCHLORIDE 2 MG/ML
30 INJECTION INTRAMUSCULAR; INTRAVENOUS; SUBCUTANEOUS
Qty: 0 | Refills: 0 | Status: DISCONTINUED | OUTPATIENT
Start: 2018-03-14 | End: 2018-03-17

## 2018-03-14 RX ORDER — SODIUM CHLORIDE 9 MG/ML
1000 INJECTION, SOLUTION INTRAVENOUS
Qty: 0 | Refills: 0 | Status: DISCONTINUED | OUTPATIENT
Start: 2018-03-14 | End: 2018-03-16

## 2018-03-14 RX ORDER — HEPARIN SODIUM 5000 [USP'U]/ML
7500 INJECTION INTRAVENOUS; SUBCUTANEOUS EVERY 8 HOURS
Qty: 0 | Refills: 0 | Status: DISCONTINUED | OUTPATIENT
Start: 2018-03-19 | End: 2018-03-20

## 2018-03-14 RX ADMIN — Medication 100 MILLIEQUIVALENT(S): at 10:27

## 2018-03-14 RX ADMIN — SODIUM CHLORIDE 150 MILLILITER(S): 9 INJECTION, SOLUTION INTRAVENOUS at 00:35

## 2018-03-14 RX ADMIN — HYDROMORPHONE HYDROCHLORIDE 30 MILLILITER(S): 2 INJECTION INTRAMUSCULAR; INTRAVENOUS; SUBCUTANEOUS at 21:50

## 2018-03-14 RX ADMIN — HEPARIN SODIUM 7500 UNIT(S): 5000 INJECTION INTRAVENOUS; SUBCUTANEOUS at 05:47

## 2018-03-14 RX ADMIN — HYDROMORPHONE HYDROCHLORIDE 1 MILLIGRAM(S): 2 INJECTION INTRAMUSCULAR; INTRAVENOUS; SUBCUTANEOUS at 10:45

## 2018-03-14 RX ADMIN — HYDROMORPHONE HYDROCHLORIDE 1 MILLIGRAM(S): 2 INJECTION INTRAMUSCULAR; INTRAVENOUS; SUBCUTANEOUS at 06:19

## 2018-03-14 RX ADMIN — HYDROMORPHONE HYDROCHLORIDE 1 MILLIGRAM(S): 2 INJECTION INTRAMUSCULAR; INTRAVENOUS; SUBCUTANEOUS at 07:00

## 2018-03-14 RX ADMIN — HYDROMORPHONE HYDROCHLORIDE 1 MILLIGRAM(S): 2 INJECTION INTRAMUSCULAR; INTRAVENOUS; SUBCUTANEOUS at 10:27

## 2018-03-14 NOTE — PROGRESS NOTE ADULT - ASSESSMENT
39 yo female with hx of ventral hernia repair 1/2017 complicated by wound infection that required IR drainage 2/2017, presents with chronic drainage from the wound since October 2017. Patient has never followed up with surgeon as outpatient.       No abx at this time  DM diet  ISS  pain control  Wound care  Dr. Hurtado to evaluate in AM  SQH/SCDs/OOBA/IS  AM labs 39 yo female with hx of ventral hernia repair 1/2017 complicated by wound infection that required IR drainage 2/2017, presents with chronic drainage from the wound since October 2017. Patient has never followed up with surgeon as outpatient.     NPO   IVF  ISS  pain control  Wound care  OR TODAY  SQH/SCDs/OOBA/IS  AM labs

## 2018-03-14 NOTE — PROGRESS NOTE ADULT - SUBJECTIVE AND OBJECTIVE BOX
PRE OPERATIVE NOTE    Pre-op Diagnosis: abdominal fluid collection   Procedure: Excision of fistula possible removal of mesh  Surgeon: Dr. Hurtado     Consent in chart                          13.4   10.9  )-----------( 473      ( 14 Mar 2018 08:15 )             42.7     03-14    142  |  101  |  12  ----------------------------<  129<H>  3.7   |  28  |  0.96    Ca    9.2      14 Mar 2018 08:15  Phos  3.8     03-14  Mg     2.0     -14    TPro  7.8  /  Alb  4.0  /  TBili  0.2  /  DBili  x   /  AST  21  /  ALT  16  /  AlkPhos  113  03-12    PT/INR - ( 13 Mar 2018 12:59 )   PT: 12.7 sec;   INR: 1.14          PTT - ( 13 Mar 2018 12:59 )  PTT:30.4 sec  Urinalysis Basic - ( 13 Mar 2018 21:51 )    Color: Yellow / Appearance: Clear / S.025 / pH: x  Gluc: x / Ketone: NEGATIVE  / Bili: Negative / Urobili: 0.2 E.U./dL   Blood: x / Protein: NEGATIVE mg/dL / Nitrite: NEGATIVE   Leuk Esterase: NEGATIVE / RBC: x / WBC x   Sq Epi: x / Non Sq Epi: x / Bacteria: x        Type & Screen:  O positive antibody screen negative   CXR: No acute pulmonary pathology.  EKG: Line Normal sinus rhythm        A/P: 38yFemale planned for above procedure  1. NPO past midnight, except medications  2. IVFlactated ringers. 1000 milliLiter(s) IV Continuous <Continuous>  potassium chloride  10 mEq/100 mL IVPB 10 milliEquivalent(s) IV Intermittent every 1 hour    3. [ ] Blood on hold, Units:

## 2018-03-14 NOTE — PROGRESS NOTE ADULT - SUBJECTIVE AND OBJECTIVE BOX
POST-OPERATIVE NOTE    Procedure: Mesh removal     Diagnosis/Indication: Infected Mesh Granuloma     Surgeon: Dr. Hurtado    S: Pt has no complaints. Denies CP, SOB, FORBES, calf tenderness. Pain controlled with medication. Denies nausea, vomiting.    O:  T(C): 37.1 (03-14-18 @ 21:30), Max: 37.1 (03-14-18 @ 21:30)  T(F): 98.8 (03-14-18 @ 21:30), Max: 98.8 (03-14-18 @ 21:30)  HR: 79 (03-14-18 @ 21:30) (58 - 79)  BP: 154/71 (03-14-18 @ 21:30) (140/73 - 175/61)  RR: 16 (03-14-18 @ 21:30) (12 - 22)  SpO2: 97% (03-14-18 @ 21:30) (92% - 100%)  Wt(kg): --                        13.4   10.9  )-----------( 473      ( 14 Mar 2018 08:15 )             42.7     03-14    142  |  101  |  12  ----------------------------<  129<H>  3.7   |  28  |  0.96    Ca    9.2      14 Mar 2018 08:15  Phos  3.8     03-14  Mg     2.0     03-14        Gen: NAD, resting comfortably in bed  C/V: NSR  Pulm: Nonlabored breathing, no respiratory distress  Abd: soft, NT/ND. Incision area covered with vac and is in place and suctioning  Extrem: WWP, no calf edema or tenderness, SCDs in place      A/P: 38y Female s/p above procedure  Diet: reg  IVF: LR @100  Pain/nausea control  SQH/SCDs/OOBA/IS  Dispo pending pain control, PO tolerance, clinical improvement

## 2018-03-14 NOTE — PRE-OP CHECKLIST - SELECT TESTS ORDERED
EKG/BMP/HCG/Type and Screen/CBC/CMP/Urinalysis/CXR hgc <.1/CMP/Urinalysis/EKG/CBC/HCG/BMP/Type and Screen/CXR BMP/Urinalysis/HCG/CXR/CBC/POCT Blood Glucose/CMP/Type and Screen/EKG

## 2018-03-14 NOTE — PROGRESS NOTE ADULT - ASSESSMENT
39 yo female with hx of ventral hernia repair 1/2017 complicated by wound infection that required IR drainage 2/2017, presents with chronic drainage from the wound since October 2017. Patient has never followed up with surgeon as outpatient.       NPO  ISS  pain control  OR Today   SQH/SCDs/OOBA/IS  AM labs

## 2018-03-14 NOTE — PROGRESS NOTE ADULT - SUBJECTIVE AND OBJECTIVE BOX
O/N: SQH ordered, UA: wnl, VSS. FAUSTINO  3/13: NPO after midnight , pt c/o tooth pain ,VSS OVERNIGHT EVENTS: SQH ordered, UA: wnl, VSS. FAUSTINO  3/13: NPO after midnight , pt c/o tooth pain ,VSS         SUBJECTIVE: Patient denies any complaints   Flatus: [X] YES [] NO             Bowel Movement: [ ] YES [X ] NO  Pain (0-10):            Pain Control Adequate: [X ] YES [ ] NO  Nausea: [ ] YES [ X] NO            Vomiting: [ ] YES [X ] NO  Diarrhea: [ ] YES [X ] NO         Constipation: [ ] YES [ X] NO     Chest Pain: [ ] YES [X ] NO    SOB:  [ ] YES [ X] NO    heparin  Injectable 7500 Unit(s) SubCutaneous every 8 hours      Vital Signs Last 24 Hrs  T(C): 37.1 (14 Mar 2018 05:39), Max: 37.1 (13 Mar 2018 14:59)  T(F): 98.8 (14 Mar 2018 05:39), Max: 98.8 (13 Mar 2018 14:59)  HR: 66 (14 Mar 2018 05:39) (65 - 78)  BP: 145/71 (14 Mar 2018 05:39) (130/75 - 175/96)  BP(mean): --  RR: 18 (14 Mar 2018 05:39) (16 - 18)  SpO2: 98% (14 Mar 2018 05:39) (98% - 99%)  I&O's Detail    13 Mar 2018 07:01  -  14 Mar 2018 07:00  --------------------------------------------------------  IN:    lactated ringers.: 1050 mL    Oral Fluid: 660 mL  Total IN: 1710 mL    OUT:    Voided: 400 mL  Total OUT: 400 mL    Total NET: 1310 mL          General: NAD, resting comfortably in bed  C/V: NSR  Pulm: Nonlabored breathing, no respiratory distress  Abd: Soft, non-distended, TTP around incision site, incision clean dry and intact  Extrem: WWP, no edema, SCDs in place        LABS:                        13.6   13.9  )-----------( 446      ( 13 Mar 2018 11:10 )             42.4     03-13    138  |  100  |  11  ----------------------------<  166<H>  4.0   |  25  |  0.74    Ca    9.5      13 Mar 2018 11:10  Phos  3.3     03-  Mg     2.1     -13    TPro  7.8  /  Alb  4.0  /  TBili  0.2  /  DBili  x   /  AST  21  /  ALT  16  /  AlkPhos  113  03-12    PT/INR - ( 13 Mar 2018 12:59 )   PT: 12.7 sec;   INR: 1.14          PTT - ( 13 Mar 2018 12:59 )  PTT:30.4 sec  Urinalysis Basic - ( 13 Mar 2018 21:51 )    Color: Yellow / Appearance: Clear / S.025 / pH: x  Gluc: x / Ketone: NEGATIVE  / Bili: Negative / Urobili: 0.2 E.U./dL   Blood: x / Protein: NEGATIVE mg/dL / Nitrite: NEGATIVE   Leuk Esterase: NEGATIVE / RBC: x / WBC x   Sq Epi: x / Non Sq Epi: x / Bacteria: x

## 2018-03-15 LAB
ANION GAP SERPL CALC-SCNC: 11 MMOL/L — SIGNIFICANT CHANGE UP (ref 5–17)
BUN SERPL-MCNC: 7 MG/DL — SIGNIFICANT CHANGE UP (ref 7–23)
CALCIUM SERPL-MCNC: 8.3 MG/DL — LOW (ref 8.4–10.5)
CHLORIDE SERPL-SCNC: 98 MMOL/L — SIGNIFICANT CHANGE UP (ref 96–108)
CO2 SERPL-SCNC: 27 MMOL/L — SIGNIFICANT CHANGE UP (ref 22–31)
CREAT SERPL-MCNC: 0.72 MG/DL — SIGNIFICANT CHANGE UP (ref 0.5–1.3)
GLUCOSE BLDC GLUCOMTR-MCNC: 116 MG/DL — HIGH (ref 70–99)
GLUCOSE BLDC GLUCOMTR-MCNC: 130 MG/DL — HIGH (ref 70–99)
GLUCOSE BLDC GLUCOMTR-MCNC: 133 MG/DL — HIGH (ref 70–99)
GLUCOSE BLDC GLUCOMTR-MCNC: 198 MG/DL — HIGH (ref 70–99)
GLUCOSE SERPL-MCNC: 117 MG/DL — HIGH (ref 70–99)
HCT VFR BLD CALC: 37.5 % — SIGNIFICANT CHANGE UP (ref 34.5–45)
HGB BLD-MCNC: 11.9 G/DL — SIGNIFICANT CHANGE UP (ref 11.5–15.5)
MAGNESIUM SERPL-MCNC: 1.8 MG/DL — SIGNIFICANT CHANGE UP (ref 1.6–2.6)
MCHC RBC-ENTMCNC: 25.5 PG — LOW (ref 27–34)
MCHC RBC-ENTMCNC: 31.7 G/DL — LOW (ref 32–36)
MCV RBC AUTO: 80.5 FL — SIGNIFICANT CHANGE UP (ref 80–100)
PHOSPHATE SERPL-MCNC: 2.9 MG/DL — SIGNIFICANT CHANGE UP (ref 2.5–4.5)
PLATELET # BLD AUTO: 336 K/UL — SIGNIFICANT CHANGE UP (ref 150–400)
POTASSIUM SERPL-MCNC: 3.2 MMOL/L — LOW (ref 3.5–5.3)
POTASSIUM SERPL-SCNC: 3.2 MMOL/L — LOW (ref 3.5–5.3)
RBC # BLD: 4.66 M/UL — SIGNIFICANT CHANGE UP (ref 3.8–5.2)
RBC # FLD: 17.2 % — HIGH (ref 10.3–16.9)
SODIUM SERPL-SCNC: 136 MMOL/L — SIGNIFICANT CHANGE UP (ref 135–145)
WBC # BLD: 11.3 K/UL — HIGH (ref 3.8–10.5)
WBC # FLD AUTO: 11.3 K/UL — HIGH (ref 3.8–10.5)

## 2018-03-15 RX ORDER — DIPHENHYDRAMINE HCL 50 MG
25 CAPSULE ORAL EVERY 4 HOURS
Qty: 0 | Refills: 0 | Status: DISCONTINUED | OUTPATIENT
Start: 2018-03-15 | End: 2018-03-20

## 2018-03-15 RX ORDER — MAGNESIUM SULFATE 500 MG/ML
1 VIAL (ML) INJECTION ONCE
Qty: 0 | Refills: 0 | Status: DISCONTINUED | OUTPATIENT
Start: 2018-03-15 | End: 2018-03-20

## 2018-03-15 RX ORDER — POTASSIUM CHLORIDE 20 MEQ
10 PACKET (EA) ORAL
Qty: 0 | Refills: 0 | Status: DISCONTINUED | OUTPATIENT
Start: 2018-03-15 | End: 2018-03-16

## 2018-03-15 RX ADMIN — Medication 2: at 12:01

## 2018-03-15 RX ADMIN — Medication 25 MILLIGRAM(S): at 00:23

## 2018-03-15 RX ADMIN — ONDANSETRON 4 MILLIGRAM(S): 8 TABLET, FILM COATED ORAL at 09:24

## 2018-03-15 RX ADMIN — Medication 100 MILLIGRAM(S): at 13:16

## 2018-03-15 RX ADMIN — Medication 100 MILLIGRAM(S): at 22:43

## 2018-03-15 RX ADMIN — Medication 100 MILLIGRAM(S): at 00:02

## 2018-03-15 RX ADMIN — Medication 100 MILLIGRAM(S): at 07:12

## 2018-03-15 NOTE — PROGRESS NOTE ADULT - ASSESSMENT
39 yo female with hx of ventral hernia repair 1/2017 complicated by wound infection that required IR drainage 2/2017, presents with chronic drainage from the wound since October 2017. Patient has never followed up with surgeon as outpatient.       NPO  ISS  pain control  OR Today   SQH/SCDs/OOBA/IS  AM labs 37 yo female with hx of ventral hernia repair 1/2017 complicated by wound infection that required IR drainage 2/2017, presents with chronic drainage from the wound since October 2017. Patient has never followed up with surgeon as outpatient.     BCLD  ISS  pain control  OR Today   SQH/SCDs/OOBA/IS  AM labs

## 2018-03-15 NOTE — PROGRESS NOTE ADULT - SUBJECTIVE AND OBJECTIVE BOX
O/N: POC wnl, FAUSTINO  3/14: OR TODAY for above    POD 1 Abdominal Fistulectomy, Drainage of preperitoneal abscess, explant of infected mesh, repair with biologic mesh O/N: POC wnl, FAUSTINO  3/14: OR TODAY for above    POD 1 Abdominal Fistulectomy, Drainage of preperitoneal abscess, explant of infected mesh, repair with biologic mesh       SUBJECTIVE: Patient seen and examined bedside by chief resident. Pain controlled. FAUSTINO.    clindamycin IVPB 600 milliGRAM(s) IV Intermittent every 8 hours      Vital Signs Last 24 Hrs  T(C): 37.1 (15 Mar 2018 05:40), Max: 37.1 (14 Mar 2018 21:30)  T(F): 98.7 (15 Mar 2018 05:40), Max: 98.8 (14 Mar 2018 21:30)  HR: 85 (15 Mar 2018 05:40) (54 - 85)  BP: 153/85 (15 Mar 2018 05:40) (140/73 - 175/61)  BP(mean): 99 (14 Mar 2018 21:30) (98 - 126)  RR: 16 (15 Mar 2018 05:40) (12 - 22)  SpO2: 96% (15 Mar 2018 05:40) (92% - 100%)  I&O's Detail    14 Mar 2018 07:01  -  15 Mar 2018 07:00  --------------------------------------------------------  IN:    lactated ringers.: 800 mL    Oral Fluid: 660 mL  Total IN: 1460 mL    OUT:    Drain: 60 mL    Voided: 1450 mL  Total OUT: 1510 mL    Total NET: -50 mL          General: NAD, resting comfortably in bed  C/V: NSR  Pulm: Nonlabored breathing, no respiratory distress  Abd: soft, NT/ND. Incisions CDI  Extrem: WWP, no edema, SCDs in place        LABS:                        11.9   11.3  )-----------( 336      ( 15 Mar 2018 06:36 )             37.5     03-15    136  |  98  |  7   ----------------------------<  117<H>  3.2<L>   |  27  |  0.72    Ca    8.3<L>      15 Mar 2018 06:36  Phos  2.9     03-15  Mg     1.8     03-15      PT/INR - ( 13 Mar 2018 12:59 )   PT: 12.7 sec;   INR: 1.14          PTT - ( 13 Mar 2018 12:59 )  PTT:30.4 sec  Urinalysis Basic - ( 13 Mar 2018 21:51 )    Color: Yellow / Appearance: Clear / S.025 / pH: x  Gluc: x / Ketone: NEGATIVE  / Bili: Negative / Urobili: 0.2 E.U./dL   Blood: x / Protein: NEGATIVE mg/dL / Nitrite: NEGATIVE   Leuk Esterase: NEGATIVE / RBC: x / WBC x   Sq Epi: x / Non Sq Epi: x / Bacteria: x        RADIOLOGY & ADDITIONAL STUDIES:

## 2018-03-16 LAB
ANION GAP SERPL CALC-SCNC: 13 MMOL/L — SIGNIFICANT CHANGE UP (ref 5–17)
BUN SERPL-MCNC: 5 MG/DL — LOW (ref 7–23)
CALCIUM SERPL-MCNC: 8.6 MG/DL — SIGNIFICANT CHANGE UP (ref 8.4–10.5)
CHLORIDE SERPL-SCNC: 96 MMOL/L — SIGNIFICANT CHANGE UP (ref 96–108)
CO2 SERPL-SCNC: 26 MMOL/L — SIGNIFICANT CHANGE UP (ref 22–31)
CREAT SERPL-MCNC: 0.67 MG/DL — SIGNIFICANT CHANGE UP (ref 0.5–1.3)
CULTURE RESULTS: NO GROWTH — SIGNIFICANT CHANGE UP
GLUCOSE BLDC GLUCOMTR-MCNC: 126 MG/DL — HIGH (ref 70–99)
GLUCOSE BLDC GLUCOMTR-MCNC: 153 MG/DL — HIGH (ref 70–99)
GLUCOSE BLDC GLUCOMTR-MCNC: 158 MG/DL — HIGH (ref 70–99)
GLUCOSE BLDC GLUCOMTR-MCNC: 213 MG/DL — HIGH (ref 70–99)
GLUCOSE SERPL-MCNC: 132 MG/DL — HIGH (ref 70–99)
HCT VFR BLD CALC: 38.9 % — SIGNIFICANT CHANGE UP (ref 34.5–45)
HGB BLD-MCNC: 12.5 G/DL — SIGNIFICANT CHANGE UP (ref 11.5–15.5)
MAGNESIUM SERPL-MCNC: 2.2 MG/DL — SIGNIFICANT CHANGE UP (ref 1.6–2.6)
MCHC RBC-ENTMCNC: 25.6 PG — LOW (ref 27–34)
MCHC RBC-ENTMCNC: 32.1 G/DL — SIGNIFICANT CHANGE UP (ref 32–36)
MCV RBC AUTO: 79.6 FL — LOW (ref 80–100)
PHOSPHATE SERPL-MCNC: 3.3 MG/DL — SIGNIFICANT CHANGE UP (ref 2.5–4.5)
PLATELET # BLD AUTO: 347 K/UL — SIGNIFICANT CHANGE UP (ref 150–400)
POTASSIUM SERPL-MCNC: 3.3 MMOL/L — LOW (ref 3.5–5.3)
POTASSIUM SERPL-SCNC: 3.3 MMOL/L — LOW (ref 3.5–5.3)
RBC # BLD: 4.89 M/UL — SIGNIFICANT CHANGE UP (ref 3.8–5.2)
RBC # FLD: 16.9 % — SIGNIFICANT CHANGE UP (ref 10.3–16.9)
SODIUM SERPL-SCNC: 135 MMOL/L — SIGNIFICANT CHANGE UP (ref 135–145)
SPECIMEN SOURCE: SIGNIFICANT CHANGE UP
WBC # BLD: 10.4 K/UL — SIGNIFICANT CHANGE UP (ref 3.8–10.5)
WBC # FLD AUTO: 10.4 K/UL — SIGNIFICANT CHANGE UP (ref 3.8–10.5)

## 2018-03-16 RX ORDER — POTASSIUM CHLORIDE 20 MEQ
40 PACKET (EA) ORAL ONCE
Qty: 0 | Refills: 0 | Status: DISCONTINUED | OUTPATIENT
Start: 2018-03-16 | End: 2018-03-16

## 2018-03-16 RX ORDER — POTASSIUM CHLORIDE 20 MEQ
20 PACKET (EA) ORAL
Qty: 0 | Refills: 0 | Status: COMPLETED | OUTPATIENT
Start: 2018-03-16 | End: 2018-03-16

## 2018-03-16 RX ORDER — POTASSIUM CHLORIDE 20 MEQ
10 PACKET (EA) ORAL
Qty: 0 | Refills: 0 | Status: COMPLETED | OUTPATIENT
Start: 2018-03-16 | End: 2018-03-16

## 2018-03-16 RX ORDER — SODIUM CHLORIDE 9 MG/ML
1000 INJECTION, SOLUTION INTRAVENOUS
Qty: 0 | Refills: 0 | Status: DISCONTINUED | OUTPATIENT
Start: 2018-03-16 | End: 2018-03-19

## 2018-03-16 RX ORDER — DIPHENHYDRAMINE HCL 50 MG
25 CAPSULE ORAL ONCE
Qty: 0 | Refills: 0 | Status: COMPLETED | OUTPATIENT
Start: 2018-03-16 | End: 2018-03-16

## 2018-03-16 RX ORDER — METOCLOPRAMIDE HCL 10 MG
5 TABLET ORAL EVERY 6 HOURS
Qty: 0 | Refills: 0 | Status: DISCONTINUED | OUTPATIENT
Start: 2018-03-16 | End: 2018-03-20

## 2018-03-16 RX ADMIN — Medication 2: at 12:05

## 2018-03-16 RX ADMIN — Medication 25 MILLIGRAM(S): at 22:56

## 2018-03-16 RX ADMIN — SCOPALAMINE 1 PATCH: 1 PATCH, EXTENDED RELEASE TRANSDERMAL at 10:28

## 2018-03-16 RX ADMIN — Medication 20 MILLIEQUIVALENT(S): at 11:13

## 2018-03-16 RX ADMIN — Medication 25 MILLIGRAM(S): at 11:24

## 2018-03-16 RX ADMIN — Medication 4: at 17:36

## 2018-03-16 RX ADMIN — Medication 100 MILLIEQUIVALENT(S): at 14:21

## 2018-03-16 RX ADMIN — Medication 25 MILLIGRAM(S): at 07:24

## 2018-03-16 RX ADMIN — Medication 20 MILLIEQUIVALENT(S): at 12:35

## 2018-03-16 RX ADMIN — Medication 100 MILLIGRAM(S): at 05:51

## 2018-03-16 RX ADMIN — Medication 2: at 22:45

## 2018-03-16 RX ADMIN — Medication 100 MILLIGRAM(S): at 13:33

## 2018-03-16 RX ADMIN — ONDANSETRON 4 MILLIGRAM(S): 8 TABLET, FILM COATED ORAL at 07:29

## 2018-03-16 RX ADMIN — SCOPALAMINE 1 PATCH: 1 PATCH, EXTENDED RELEASE TRANSDERMAL at 10:29

## 2018-03-16 RX ADMIN — Medication 100 MILLIGRAM(S): at 21:57

## 2018-03-16 RX ADMIN — Medication 5 MILLIGRAM(S): at 13:32

## 2018-03-16 RX ADMIN — HYDROMORPHONE HYDROCHLORIDE 30 MILLILITER(S): 2 INJECTION INTRAMUSCULAR; INTRAVENOUS; SUBCUTANEOUS at 01:13

## 2018-03-16 RX ADMIN — Medication 100 MILLIEQUIVALENT(S): at 10:16

## 2018-03-16 RX ADMIN — Medication 25 MILLIGRAM(S): at 13:33

## 2018-03-16 NOTE — DIETITIAN INITIAL EVALUATION ADULT. - OTHER INFO
39yo F w/ hx of ventral hernia repair 1/2017 c/b wound infection that required IR drainage 2/2017, p/w chronic drainage from the wound since 10/2017. Pt was aggitated at time of assessment, limited information obtained. Currently NPO for procedure, previously on regular diet. C/o nausea- RN aware. Has not had BM since procedure. Reports previously having sips of water on regular diet. Allergic to shellfish. No known wt changes. Skin: wound; GI WDL per flowsheet.

## 2018-03-16 NOTE — PROGRESS NOTE ADULT - ASSESSMENT
39 yo female with hx of ventral hernia repair 1/2017 complicated by wound infection that required IR drainage 2/2017, presents with chronic drainage from the wound since October 2017. Patient has never followed up with surgeon as outpatient.       NPO  ISS  pain control  OR Today   SQH/SCDs/OOBA/IS  AM labs 39 yo female with hx of ventral hernia repair 1/2017 complicated by wound infection that required IR drainage 2/2017, presents with chronic drainage from the wound since October 2017. Patient has never followed up with surgeon as outpatient.     NPO  ISS  pain control  OR Today   SQH/SCDs/OOBA/IS  AM labs

## 2018-03-16 NOTE — DIETITIAN INITIAL EVALUATION ADULT. - ENERGY NEEDS
Height: 5'7" Weight: 230lbs, IBW 135lbs+/-10%, %%, BMI 36  IBW used for calculations as pt >120% of IBW   Nutrient needs based on [facility] standards of care for maintenance in adults.   needs adjusted 2/2 wound

## 2018-03-16 NOTE — PROGRESS NOTE ADULT - SUBJECTIVE AND OBJECTIVE BOX
O/N: encouraged ambulating FAUSTINO  3/15: started on regular diet tolerating , VSS , FAUSTINO O/N: encouraged ambulating FAUSTINO  3/15: started on regular diet tolerating , VSS , FAUSTINO     STATUS POST:       SUBJECTIVE: Patient seen and examined bedside by chief resident. Pain is well-controlled. Mild nausea.    clindamycin IVPB 600 milliGRAM(s) IV Intermittent every 8 hours      Vital Signs Last 24 Hrs  T(C): 36.7 (16 Mar 2018 12:39), Max: 37.4 (16 Mar 2018 05:45)  T(F): 98 (16 Mar 2018 12:39), Max: 99.3 (16 Mar 2018 05:45)  HR: 82 (16 Mar 2018 12:39) (70 - 91)  BP: 162/92 (16 Mar 2018 12:39) (148/91 - 174/83)  BP(mean): --  RR: 16 (16 Mar 2018 12:39) (16 - 18)  SpO2: 100% (16 Mar 2018 12:39) (99% - 100%)  I&O's Detail    15 Mar 2018 07:01  -  16 Mar 2018 07:00  --------------------------------------------------------  IN:    IV PiggyBack: 100 mL    Oral Fluid: 620 mL  Total IN: 720 mL    OUT:    Drain: 225 mL  Total OUT: 225 mL    Total NET: 495 mL      16 Mar 2018 07:01  -  16 Mar 2018 15:23  --------------------------------------------------------  IN:    IV PiggyBack: 150 mL    lactated ringers.: 800 mL  Total IN: 950 mL    OUT:    Voided: 900 mL  Total OUT: 900 mL    Total NET: 50 mL          General: NAD, resting comfortably in bed  C/V: NSR  Pulm: Nonlabored breathing, no respiratory distress  Abd: soft, ND, NT, wound VAC   Extrem: WWP, no edema, SCDs in place        LABS:                        12.5   10.4  )-----------( 347      ( 16 Mar 2018 07:06 )             38.9     03-16    135  |  96  |  5<L>  ----------------------------<  132<H>  3.3<L>   |  26  |  0.67    Ca    8.6      16 Mar 2018 07:06  Phos  3.3     03-16  Mg     2.2     03-16            RADIOLOGY & ADDITIONAL STUDIES:

## 2018-03-17 LAB
ANION GAP SERPL CALC-SCNC: 13 MMOL/L — SIGNIFICANT CHANGE UP (ref 5–17)
APTT BLD: 29.2 SEC — SIGNIFICANT CHANGE UP (ref 27.5–37.4)
BLD GP AB SCN SERPL QL: NEGATIVE — SIGNIFICANT CHANGE UP
BUN SERPL-MCNC: 7 MG/DL — SIGNIFICANT CHANGE UP (ref 7–23)
CALCIUM SERPL-MCNC: 9.2 MG/DL — SIGNIFICANT CHANGE UP (ref 8.4–10.5)
CHLORIDE SERPL-SCNC: 97 MMOL/L — SIGNIFICANT CHANGE UP (ref 96–108)
CO2 SERPL-SCNC: 27 MMOL/L — SIGNIFICANT CHANGE UP (ref 22–31)
CREAT SERPL-MCNC: 0.83 MG/DL — SIGNIFICANT CHANGE UP (ref 0.5–1.3)
GLUCOSE BLDC GLUCOMTR-MCNC: 117 MG/DL — HIGH (ref 70–99)
GLUCOSE BLDC GLUCOMTR-MCNC: 129 MG/DL — HIGH (ref 70–99)
GLUCOSE BLDC GLUCOMTR-MCNC: 146 MG/DL — HIGH (ref 70–99)
GLUCOSE BLDC GLUCOMTR-MCNC: 90 MG/DL — SIGNIFICANT CHANGE UP (ref 70–99)
GLUCOSE SERPL-MCNC: 143 MG/DL — HIGH (ref 70–99)
HCT VFR BLD CALC: 42 % — SIGNIFICANT CHANGE UP (ref 34.5–45)
HGB BLD-MCNC: 13.4 G/DL — SIGNIFICANT CHANGE UP (ref 11.5–15.5)
INR BLD: 1.22 — HIGH (ref 0.88–1.16)
MAGNESIUM SERPL-MCNC: 1.9 MG/DL — SIGNIFICANT CHANGE UP (ref 1.6–2.6)
MCHC RBC-ENTMCNC: 26.2 PG — LOW (ref 27–34)
MCHC RBC-ENTMCNC: 31.9 G/DL — LOW (ref 32–36)
MCV RBC AUTO: 82 FL — SIGNIFICANT CHANGE UP (ref 80–100)
PHOSPHATE SERPL-MCNC: 3 MG/DL — SIGNIFICANT CHANGE UP (ref 2.5–4.5)
PLATELET # BLD AUTO: 365 K/UL — SIGNIFICANT CHANGE UP (ref 150–400)
POTASSIUM SERPL-MCNC: 3.8 MMOL/L — SIGNIFICANT CHANGE UP (ref 3.5–5.3)
POTASSIUM SERPL-SCNC: 3.8 MMOL/L — SIGNIFICANT CHANGE UP (ref 3.5–5.3)
PROTHROM AB SERPL-ACNC: 13.6 SEC — HIGH (ref 9.8–12.7)
RBC # BLD: 5.12 M/UL — SIGNIFICANT CHANGE UP (ref 3.8–5.2)
RBC # FLD: 17 % — HIGH (ref 10.3–16.9)
RH IG SCN BLD-IMP: NEGATIVE — SIGNIFICANT CHANGE UP
SODIUM SERPL-SCNC: 137 MMOL/L — SIGNIFICANT CHANGE UP (ref 135–145)
WBC # BLD: 9.1 K/UL — SIGNIFICANT CHANGE UP (ref 3.8–10.5)
WBC # FLD AUTO: 9.1 K/UL — SIGNIFICANT CHANGE UP (ref 3.8–10.5)

## 2018-03-17 RX ORDER — DIPHENHYDRAMINE HCL 50 MG
50 CAPSULE ORAL EVERY 4 HOURS
Qty: 0 | Refills: 0 | Status: DISCONTINUED | OUTPATIENT
Start: 2018-03-17 | End: 2018-03-20

## 2018-03-17 RX ORDER — FLUCONAZOLE 150 MG/1
200 TABLET ORAL ONCE
Qty: 0 | Refills: 0 | Status: COMPLETED | OUTPATIENT
Start: 2018-03-17 | End: 2018-03-17

## 2018-03-17 RX ORDER — FLUCONAZOLE 150 MG/1
200 TABLET ORAL ONCE
Qty: 0 | Refills: 0 | Status: DISCONTINUED | OUTPATIENT
Start: 2018-03-17 | End: 2018-03-17

## 2018-03-17 RX ORDER — HYDROMORPHONE HYDROCHLORIDE 2 MG/ML
1 INJECTION INTRAMUSCULAR; INTRAVENOUS; SUBCUTANEOUS EVERY 4 HOURS
Qty: 0 | Refills: 0 | Status: DISCONTINUED | OUTPATIENT
Start: 2018-03-17 | End: 2018-03-20

## 2018-03-17 RX ADMIN — Medication 50 MILLIGRAM(S): at 12:35

## 2018-03-17 RX ADMIN — Medication 100 MILLIGRAM(S): at 13:46

## 2018-03-17 RX ADMIN — Medication 50 MILLIGRAM(S): at 04:21

## 2018-03-17 RX ADMIN — HYDROMORPHONE HYDROCHLORIDE 1 MILLIGRAM(S): 2 INJECTION INTRAMUSCULAR; INTRAVENOUS; SUBCUTANEOUS at 23:30

## 2018-03-17 RX ADMIN — SODIUM CHLORIDE 50 MILLILITER(S): 9 INJECTION, SOLUTION INTRAVENOUS at 06:08

## 2018-03-17 RX ADMIN — Medication 100 MILLIGRAM(S): at 21:18

## 2018-03-17 RX ADMIN — FLUCONAZOLE 200 MILLIGRAM(S): 150 TABLET ORAL at 12:27

## 2018-03-17 RX ADMIN — Medication 100 MILLIGRAM(S): at 05:11

## 2018-03-17 RX ADMIN — HYDROMORPHONE HYDROCHLORIDE 1 MILLIGRAM(S): 2 INJECTION INTRAMUSCULAR; INTRAVENOUS; SUBCUTANEOUS at 23:45

## 2018-03-17 RX ADMIN — HYDROMORPHONE HYDROCHLORIDE 30 MILLILITER(S): 2 INJECTION INTRAMUSCULAR; INTRAVENOUS; SUBCUTANEOUS at 01:00

## 2018-03-17 NOTE — PROGRESS NOTE ADULT - SUBJECTIVE AND OBJECTIVE BOX
O/N: encouraged abulating, FAUSTINO  3/16: afebrile, FAUSTINO. OR on Tuesday for wound VAC change vs removal of VAC/wound closure. OVERNIGHT EVENTS: encouraged abulating, FAUSTINO  3/16: afebrile, FAUSTINO. OR on Tuesday for wound VAC change vs removal of VAC/wound closure.      STATUS POST:  Abdominal Fistulectomy, Drainage of preperitoneal abscess, explant of infected mesh, repair with biologic mesh      POST OPERATIVE DAY #: 3    SUBJECTIVE: Patient examined bedside complains of incisional pain  Flatus: [] YES [X] NO             Bowel Movement: [ ] YES [X ] NO  Pain (0-10):            Pain Control Adequate: [X ] YES [ ] NO  Nausea: [ ] YES [X ] NO            Vomiting: [ ] YES [ X] NO  Diarrhea: [ ] YES [X ] NO         Constipation: [ ] YES [X ] NO     Chest Pain: [ ] YES [X ] NO    SOB:  [ ] YES [X ] NO    clindamycin IVPB 600 milliGRAM(s) IV Intermittent every 8 hours  fluconAZOLE   Tablet 200 milliGRAM(s) Oral once      Vital Signs Last 24 Hrs  T(C): 36.6 (17 Mar 2018 08:00), Max: 37.3 (16 Mar 2018 17:20)  T(F): 97.8 (17 Mar 2018 08:00), Max: 99.1 (16 Mar 2018 17:20)  HR: 69 (17 Mar 2018 08:00) (69 - 85)  BP: 122/82 (17 Mar 2018 08:00) (122/82 - 162/92)  BP(mean): --  RR: 16 (17 Mar 2018 08:00) (16 - 17)  SpO2: 100% (17 Mar 2018 08:00) (97% - 100%)  I&O's Detail    16 Mar 2018 07:01  -  17 Mar 2018 07:00  --------------------------------------------------------  IN:    IV PiggyBack: 200 mL    lactated ringers.: 900 mL    lactated ringers.: 450 mL    Solution: 150 mL  Total IN: 1700 mL    OUT:    Drain: 65 mL    Voided: 1200 mL  Total OUT: 1265 mL    Total NET: 435 mL          General: NAD, resting comfortably in bed  C/V: NSR  Pulm: Nonlabored breathing, no respiratory distress  Abd: soft, non distended , TTP around wound vac,  wound vac functioning   Extrem: WWP, no edema, SCDs in place        LABS:                        13.4   9.1   )-----------( 365      ( 17 Mar 2018 06:35 )             42.0     03-17    137  |  97  |  7   ----------------------------<  143<H>  3.8   |  27  |  0.83    Ca    9.2      17 Mar 2018 06:35  Phos  3.0     03-17  Mg     1.9     03-17      PT/INR - ( 17 Mar 2018 06:35 )   PT: 13.6 sec;   INR: 1.22          PTT - ( 17 Mar 2018 06:35 )  PTT:29.2 sec      RADIOLOGY & ADDITIONAL STUDIES:

## 2018-03-17 NOTE — PROGRESS NOTE ADULT - ASSESSMENT
39 yo female with hx of ventral hernia repair 1/2017 complicated by wound infection that required IR drainage 2/2017, presents with chronic drainage from the wound since October 2017. Patient has never followed up with surgeon as outpatient.       NPO  ISS  pain control  OR Today   SQH/SCDs/OOBA/IS  AM labs 39 yo female with hx of ventral hernia repair 1/2017 complicated by wound infection that required IR drainage 2/2017, presents with chronic drainage from the wound since October 2017. Patient has never followed up with surgeon as outpatient.       Regular diet  ISS  pain control  OR Today   SQH/SCDs/OOBA/IS  AM labs

## 2018-03-18 LAB
ANION GAP SERPL CALC-SCNC: 15 MMOL/L — SIGNIFICANT CHANGE UP (ref 5–17)
BUN SERPL-MCNC: 8 MG/DL — SIGNIFICANT CHANGE UP (ref 7–23)
CALCIUM SERPL-MCNC: 9.7 MG/DL — SIGNIFICANT CHANGE UP (ref 8.4–10.5)
CHLORIDE SERPL-SCNC: 95 MMOL/L — LOW (ref 96–108)
CO2 SERPL-SCNC: 26 MMOL/L — SIGNIFICANT CHANGE UP (ref 22–31)
CREAT SERPL-MCNC: 0.76 MG/DL — SIGNIFICANT CHANGE UP (ref 0.5–1.3)
GLUCOSE BLDC GLUCOMTR-MCNC: 124 MG/DL — HIGH (ref 70–99)
GLUCOSE BLDC GLUCOMTR-MCNC: 128 MG/DL — HIGH (ref 70–99)
GLUCOSE BLDC GLUCOMTR-MCNC: 150 MG/DL — HIGH (ref 70–99)
GLUCOSE BLDC GLUCOMTR-MCNC: 160 MG/DL — HIGH (ref 70–99)
GLUCOSE BLDC GLUCOMTR-MCNC: 168 MG/DL — HIGH (ref 70–99)
GLUCOSE SERPL-MCNC: 124 MG/DL — HIGH (ref 70–99)
HCG UR QL: NEGATIVE — SIGNIFICANT CHANGE UP
HCT VFR BLD CALC: 42.5 % — SIGNIFICANT CHANGE UP (ref 34.5–45)
HGB BLD-MCNC: 13.6 G/DL — SIGNIFICANT CHANGE UP (ref 11.5–15.5)
MAGNESIUM SERPL-MCNC: 2.1 MG/DL — SIGNIFICANT CHANGE UP (ref 1.6–2.6)
MCHC RBC-ENTMCNC: 26.1 PG — LOW (ref 27–34)
MCHC RBC-ENTMCNC: 32 G/DL — SIGNIFICANT CHANGE UP (ref 32–36)
MCV RBC AUTO: 81.6 FL — SIGNIFICANT CHANGE UP (ref 80–100)
PHOSPHATE SERPL-MCNC: 3.2 MG/DL — SIGNIFICANT CHANGE UP (ref 2.5–4.5)
PLATELET # BLD AUTO: 463 K/UL — HIGH (ref 150–400)
POTASSIUM SERPL-MCNC: 3.7 MMOL/L — SIGNIFICANT CHANGE UP (ref 3.5–5.3)
POTASSIUM SERPL-SCNC: 3.7 MMOL/L — SIGNIFICANT CHANGE UP (ref 3.5–5.3)
RBC # BLD: 5.21 M/UL — HIGH (ref 3.8–5.2)
RBC # FLD: 16.9 % — SIGNIFICANT CHANGE UP (ref 10.3–16.9)
SODIUM SERPL-SCNC: 136 MMOL/L — SIGNIFICANT CHANGE UP (ref 135–145)
WBC # BLD: 10.5 K/UL — SIGNIFICANT CHANGE UP (ref 3.8–10.5)
WBC # FLD AUTO: 10.5 K/UL — SIGNIFICANT CHANGE UP (ref 3.8–10.5)

## 2018-03-18 RX ADMIN — Medication 2: at 17:24

## 2018-03-18 RX ADMIN — HYDROMORPHONE HYDROCHLORIDE 1 MILLIGRAM(S): 2 INJECTION INTRAMUSCULAR; INTRAVENOUS; SUBCUTANEOUS at 10:48

## 2018-03-18 RX ADMIN — HYDROMORPHONE HYDROCHLORIDE 1 MILLIGRAM(S): 2 INJECTION INTRAMUSCULAR; INTRAVENOUS; SUBCUTANEOUS at 22:07

## 2018-03-18 RX ADMIN — Medication 100 MILLIGRAM(S): at 06:27

## 2018-03-18 RX ADMIN — Medication 100 MILLIGRAM(S): at 22:08

## 2018-03-18 RX ADMIN — Medication 100 MILLIGRAM(S): at 14:30

## 2018-03-18 RX ADMIN — HYDROMORPHONE HYDROCHLORIDE 1 MILLIGRAM(S): 2 INJECTION INTRAMUSCULAR; INTRAVENOUS; SUBCUTANEOUS at 06:40

## 2018-03-18 RX ADMIN — HYDROMORPHONE HYDROCHLORIDE 1 MILLIGRAM(S): 2 INJECTION INTRAMUSCULAR; INTRAVENOUS; SUBCUTANEOUS at 16:45

## 2018-03-18 RX ADMIN — Medication 50 MILLIGRAM(S): at 16:30

## 2018-03-18 RX ADMIN — HYDROMORPHONE HYDROCHLORIDE 1 MILLIGRAM(S): 2 INJECTION INTRAMUSCULAR; INTRAVENOUS; SUBCUTANEOUS at 06:25

## 2018-03-18 RX ADMIN — HYDROMORPHONE HYDROCHLORIDE 1 MILLIGRAM(S): 2 INJECTION INTRAMUSCULAR; INTRAVENOUS; SUBCUTANEOUS at 22:37

## 2018-03-18 RX ADMIN — Medication 5 MILLIGRAM(S): at 09:14

## 2018-03-18 RX ADMIN — Medication 2: at 08:05

## 2018-03-18 RX ADMIN — HYDROMORPHONE HYDROCHLORIDE 1 MILLIGRAM(S): 2 INJECTION INTRAMUSCULAR; INTRAVENOUS; SUBCUTANEOUS at 10:38

## 2018-03-18 RX ADMIN — HYDROMORPHONE HYDROCHLORIDE 1 MILLIGRAM(S): 2 INJECTION INTRAMUSCULAR; INTRAVENOUS; SUBCUTANEOUS at 16:30

## 2018-03-18 RX ADMIN — Medication 25 MILLIGRAM(S): at 00:42

## 2018-03-18 NOTE — PROGRESS NOTE ADULT - SUBJECTIVE AND OBJECTIVE BOX
O/N: FAUSTINO  3/17: Pain well controllled , OOB/AMB , VSS  dilaudid PCA dc'd,    POD 4 Abdominal Fistulectomy, Drainage of preperitoneal abscess, explant of infected mesh, repair with biologic mesh O/N: FAUSTINO  3/17: Pain well controllled , OOB/AMB , VSS  dilaudid PCA dc'd,    POD 4 Abdominal Fistulectomy, Drainage of preperitoneal abscess, explant of infected mesh, repair with biologic mesh    SUBJECTIVE: Patient seen and examined bedside by chief resident. Doing well. OOB today    clindamycin IVPB 600 milliGRAM(s) IV Intermittent every 8 hours      Vital Signs Last 24 Hrs  T(C): 37.2 (18 Mar 2018 05:57), Max: 37.2 (18 Mar 2018 05:57)  T(F): 98.9 (18 Mar 2018 05:57), Max: 98.9 (18 Mar 2018 05:57)  HR: 84 (18 Mar 2018 05:57) (67 - 85)  BP: 166/107 (18 Mar 2018 05:57) (136/85 - 166/107)  BP(mean): --  RR: 18 (18 Mar 2018 05:57) (16 - 18)  SpO2: 100% (18 Mar 2018 05:57) (99% - 100%)  I&O's Detail    17 Mar 2018 07:01  -  18 Mar 2018 07:00  --------------------------------------------------------  IN:    lactated ringers.: 300 mL    Oral Fluid: 1180 mL  Total IN: 1480 mL    OUT:    Drain: 25 mL    Voided: 3000 mL  Total OUT: 3025 mL    Total NET: -1545 mL          General: NAD, resting comfortably in bed  C/V: NSR  Pulm: Nonlabored breathing, no respiratory distress  Abd: soft, NT/ND. Vac in place.   Extrem: WWP, no edema, SCDs in place        LABS:                        13.6   10.5  )-----------( 463      ( 18 Mar 2018 07:34 )             42.5     03-17    137  |  97  |  7   ----------------------------<  143<H>  3.8   |  27  |  0.83    Ca    9.2      17 Mar 2018 06:35  Phos  3.0     03-17  Mg     1.9     03-17      PT/INR - ( 17 Mar 2018 06:35 )   PT: 13.6 sec;   INR: 1.22          PTT - ( 17 Mar 2018 06:35 )  PTT:29.2 sec      RADIOLOGY & ADDITIONAL STUDIES:

## 2018-03-18 NOTE — PROGRESS NOTE ADULT - ASSESSMENT
37 yo female with hx of ventral hernia repair 1/2017 complicated by wound infection that required IR drainage 2/2017, presents with chronic drainage from the wound since October 2017. Patient has never followed up with surgeon as outpatient.       Reg  ISS  pain control   SQH/SCDs/OOBA/IS  AM labs

## 2018-03-19 LAB
ANION GAP SERPL CALC-SCNC: 14 MMOL/L — SIGNIFICANT CHANGE UP (ref 5–17)
BUN SERPL-MCNC: 10 MG/DL — SIGNIFICANT CHANGE UP (ref 7–23)
CALCIUM SERPL-MCNC: 8.8 MG/DL — SIGNIFICANT CHANGE UP (ref 8.4–10.5)
CHLORIDE SERPL-SCNC: 97 MMOL/L — SIGNIFICANT CHANGE UP (ref 96–108)
CO2 SERPL-SCNC: 27 MMOL/L — SIGNIFICANT CHANGE UP (ref 22–31)
CREAT SERPL-MCNC: 0.95 MG/DL — SIGNIFICANT CHANGE UP (ref 0.5–1.3)
GLUCOSE BLDC GLUCOMTR-MCNC: 106 MG/DL — HIGH (ref 70–99)
GLUCOSE BLDC GLUCOMTR-MCNC: 148 MG/DL — HIGH (ref 70–99)
GLUCOSE BLDC GLUCOMTR-MCNC: 152 MG/DL — HIGH (ref 70–99)
GLUCOSE BLDC GLUCOMTR-MCNC: 161 MG/DL — HIGH (ref 70–99)
GLUCOSE SERPL-MCNC: 144 MG/DL — HIGH (ref 70–99)
HCT VFR BLD CALC: 40.2 % — SIGNIFICANT CHANGE UP (ref 34.5–45)
HGB BLD-MCNC: 12.8 G/DL — SIGNIFICANT CHANGE UP (ref 11.5–15.5)
MAGNESIUM SERPL-MCNC: 2 MG/DL — SIGNIFICANT CHANGE UP (ref 1.6–2.6)
MCHC RBC-ENTMCNC: 26.1 PG — LOW (ref 27–34)
MCHC RBC-ENTMCNC: 31.8 G/DL — LOW (ref 32–36)
MCV RBC AUTO: 81.9 FL — SIGNIFICANT CHANGE UP (ref 80–100)
PHOSPHATE SERPL-MCNC: 3.8 MG/DL — SIGNIFICANT CHANGE UP (ref 2.5–4.5)
PLATELET # BLD AUTO: 392 K/UL — SIGNIFICANT CHANGE UP (ref 150–400)
POTASSIUM SERPL-MCNC: 3.9 MMOL/L — SIGNIFICANT CHANGE UP (ref 3.5–5.3)
POTASSIUM SERPL-SCNC: 3.9 MMOL/L — SIGNIFICANT CHANGE UP (ref 3.5–5.3)
RBC # BLD: 4.91 M/UL — SIGNIFICANT CHANGE UP (ref 3.8–5.2)
RBC # FLD: 17 % — HIGH (ref 10.3–16.9)
SODIUM SERPL-SCNC: 138 MMOL/L — SIGNIFICANT CHANGE UP (ref 135–145)
WBC # BLD: 8.2 K/UL — SIGNIFICANT CHANGE UP (ref 3.8–10.5)
WBC # FLD AUTO: 8.2 K/UL — SIGNIFICANT CHANGE UP (ref 3.8–10.5)

## 2018-03-19 PROCEDURE — 71045 X-RAY EXAM CHEST 1 VIEW: CPT | Mod: 26

## 2018-03-19 PROCEDURE — 93010 ELECTROCARDIOGRAM REPORT: CPT

## 2018-03-19 RX ORDER — HYDROMORPHONE HYDROCHLORIDE 2 MG/ML
0.5 INJECTION INTRAMUSCULAR; INTRAVENOUS; SUBCUTANEOUS ONCE
Qty: 0 | Refills: 0 | Status: DISCONTINUED | OUTPATIENT
Start: 2018-03-19 | End: 2018-03-19

## 2018-03-19 RX ORDER — SODIUM CHLORIDE 9 MG/ML
1000 INJECTION, SOLUTION INTRAVENOUS
Qty: 0 | Refills: 0 | Status: DISCONTINUED | OUTPATIENT
Start: 2018-03-19 | End: 2018-03-20

## 2018-03-19 RX ADMIN — HYDROMORPHONE HYDROCHLORIDE 1 MILLIGRAM(S): 2 INJECTION INTRAMUSCULAR; INTRAVENOUS; SUBCUTANEOUS at 07:27

## 2018-03-19 RX ADMIN — HYDROMORPHONE HYDROCHLORIDE 1 MILLIGRAM(S): 2 INJECTION INTRAMUSCULAR; INTRAVENOUS; SUBCUTANEOUS at 18:53

## 2018-03-19 RX ADMIN — HYDROMORPHONE HYDROCHLORIDE 1 MILLIGRAM(S): 2 INJECTION INTRAMUSCULAR; INTRAVENOUS; SUBCUTANEOUS at 11:31

## 2018-03-19 RX ADMIN — Medication 2: at 07:28

## 2018-03-19 RX ADMIN — HYDROMORPHONE HYDROCHLORIDE 0.5 MILLIGRAM(S): 2 INJECTION INTRAMUSCULAR; INTRAVENOUS; SUBCUTANEOUS at 00:50

## 2018-03-19 RX ADMIN — ONDANSETRON 4 MILLIGRAM(S): 8 TABLET, FILM COATED ORAL at 18:53

## 2018-03-19 RX ADMIN — HYDROMORPHONE HYDROCHLORIDE 1 MILLIGRAM(S): 2 INJECTION INTRAMUSCULAR; INTRAVENOUS; SUBCUTANEOUS at 23:03

## 2018-03-19 RX ADMIN — HYDROMORPHONE HYDROCHLORIDE 1 MILLIGRAM(S): 2 INJECTION INTRAMUSCULAR; INTRAVENOUS; SUBCUTANEOUS at 09:37

## 2018-03-19 RX ADMIN — HEPARIN SODIUM 7500 UNIT(S): 5000 INJECTION INTRAVENOUS; SUBCUTANEOUS at 22:53

## 2018-03-19 RX ADMIN — Medication 100 MILLIGRAM(S): at 22:53

## 2018-03-19 RX ADMIN — Medication 100 MILLIGRAM(S): at 07:27

## 2018-03-19 RX ADMIN — ONDANSETRON 4 MILLIGRAM(S): 8 TABLET, FILM COATED ORAL at 09:45

## 2018-03-19 RX ADMIN — Medication 2: at 18:14

## 2018-03-19 RX ADMIN — HYDROMORPHONE HYDROCHLORIDE 1 MILLIGRAM(S): 2 INJECTION INTRAMUSCULAR; INTRAVENOUS; SUBCUTANEOUS at 19:23

## 2018-03-19 RX ADMIN — HYDROMORPHONE HYDROCHLORIDE 0.5 MILLIGRAM(S): 2 INJECTION INTRAMUSCULAR; INTRAVENOUS; SUBCUTANEOUS at 01:05

## 2018-03-19 RX ADMIN — HYDROMORPHONE HYDROCHLORIDE 1 MILLIGRAM(S): 2 INJECTION INTRAMUSCULAR; INTRAVENOUS; SUBCUTANEOUS at 12:52

## 2018-03-19 RX ADMIN — HYDROMORPHONE HYDROCHLORIDE 1 MILLIGRAM(S): 2 INJECTION INTRAMUSCULAR; INTRAVENOUS; SUBCUTANEOUS at 23:13

## 2018-03-19 RX ADMIN — HEPARIN SODIUM 7500 UNIT(S): 5000 INJECTION INTRAVENOUS; SUBCUTANEOUS at 15:29

## 2018-03-19 RX ADMIN — SCOPALAMINE 1 PATCH: 1 PATCH, EXTENDED RELEASE TRANSDERMAL at 11:32

## 2018-03-19 RX ADMIN — Medication 100 MILLIGRAM(S): at 15:29

## 2018-03-19 RX ADMIN — Medication 5 MILLIGRAM(S): at 11:31

## 2018-03-19 RX ADMIN — HEPARIN SODIUM 7500 UNIT(S): 5000 INJECTION INTRAVENOUS; SUBCUTANEOUS at 07:27

## 2018-03-19 NOTE — PROGRESS NOTE ADULT - SUBJECTIVE AND OBJECTIVE BOX
O/N: vac adjusted. VSS. FAUSTINO  3/18: Problem with Vac fixed. O/N: vac adjusted. VSS. FAUSTINO  3/18: Problem with Vac fixed.       SUBJECTIVE: Patient seen and examined bedside by chief resident. OR tomorrow. Pain well controlled.    clindamycin IVPB 600 milliGRAM(s) IV Intermittent every 8 hours  heparin  Injectable 7500 Unit(s) SubCutaneous every 8 hours      Vital Signs Last 24 Hrs  T(C): 36.1 (19 Mar 2018 09:15), Max: 37.2 (18 Mar 2018 14:34)  T(F): 96.9 (19 Mar 2018 09:15), Max: 99 (18 Mar 2018 14:34)  HR: 81 (19 Mar 2018 09:15) (81 - 90)  BP: 148/90 (19 Mar 2018 09:15) (143/87 - 159/96)  BP(mean): --  RR: 18 (19 Mar 2018 09:15) (17 - 19)  SpO2: 100% (19 Mar 2018 09:15) (95% - 100%)  I&O's Detail    18 Mar 2018 07:01  -  19 Mar 2018 07:00  --------------------------------------------------------  IN:    lactated ringers.: 475 mL    Oral Fluid: 340 mL    Solution: 50 mL  Total IN: 865 mL    OUT:    Drain: 150 mL    Voided: 1100 mL  Total OUT: 1250 mL    Total NET: -385 mL          General: NAD, resting comfortably in bed  C/V: NSR  Pulm: Nonlabored breathing, no respiratory distress  Abd: soft, NT/ND. VAC in place.  Extrem: WWP, no edema, SCDs in place        LABS:                        12.8   8.2   )-----------( 392      ( 19 Mar 2018 06:17 )             40.2     03-19    138  |  97  |  10  ----------------------------<  144<H>  3.9   |  27  |  0.95    Ca    8.8      19 Mar 2018 06:17  Phos  3.8     03-19  Mg     2.0     03-19            RADIOLOGY & ADDITIONAL STUDIES:

## 2018-03-19 NOTE — PROGRESS NOTE ADULT - SUBJECTIVE AND OBJECTIVE BOX
PRE OPERATIVE NOTE    Pre-op Diagnosis: Abdominal fistula s/p fistulectomy  Procedure: Wound VAC  Surgeon: Dr. Hurtado      3/14: Abdominal Fistulectomy, Drainage of preperitoneal abscess, explant of infected mesh, repair with biologic mesh    Consent to be obtained by attending prior to OR                          12.8   8.2   )-----------( 392      ( 19 Mar 2018 06:17 )             40.2     03-19    138  |  97  |  10  ----------------------------<  144<H>  3.9   |  27  |  0.95    Ca    8.8      19 Mar 2018 06:17  Phos  3.8     03-19  Mg     2.0     03-19            Pregnancy profile:    Type & Screen: resulted  CXR: negative for any acute findings  EKG: sinus        A/P: 38y Female planned for above procedure  NPO  IVF  Pain/nausea control  AM labs

## 2018-03-19 NOTE — PROGRESS NOTE ADULT - ASSESSMENT
37 yo female with hx of ventral hernia repair 1/2017 complicated by wound infection that required IR drainage 2/2017, presents with chronic drainage from the wound since October 2017. Patient has never followed up with surgeon as outpatient.       Reg  ISS  pain control   SQH/SCDs/OOBA/IS  AM labs 39 yo female with hx of ventral hernia repair 1/2017 complicated by wound infection that required IR drainage 2/2017, presents with chronic drainage from the wound since October 2017. Patient has never followed up with surgeon as outpatient.       Reg  ISS  pain control   SQH/SCDs/OOBA/IS  AM labs  OR 3/20 for VAC change vs closure.

## 2018-03-20 LAB
ANION GAP SERPL CALC-SCNC: 11 MMOL/L — SIGNIFICANT CHANGE UP (ref 5–17)
APTT BLD: 33.6 SEC — SIGNIFICANT CHANGE UP (ref 27.5–37.4)
BUN SERPL-MCNC: 7 MG/DL — SIGNIFICANT CHANGE UP (ref 7–23)
CALCIUM SERPL-MCNC: 9.3 MG/DL — SIGNIFICANT CHANGE UP (ref 8.4–10.5)
CHLORIDE SERPL-SCNC: 100 MMOL/L — SIGNIFICANT CHANGE UP (ref 96–108)
CO2 SERPL-SCNC: 29 MMOL/L — SIGNIFICANT CHANGE UP (ref 22–31)
CREAT SERPL-MCNC: 0.71 MG/DL — SIGNIFICANT CHANGE UP (ref 0.5–1.3)
GLUCOSE BLDC GLUCOMTR-MCNC: 133 MG/DL — HIGH (ref 70–99)
GLUCOSE BLDC GLUCOMTR-MCNC: 137 MG/DL — HIGH (ref 70–99)
GLUCOSE BLDC GLUCOMTR-MCNC: 140 MG/DL — HIGH (ref 70–99)
GLUCOSE BLDC GLUCOMTR-MCNC: 164 MG/DL — HIGH (ref 70–99)
GLUCOSE SERPL-MCNC: 114 MG/DL — HIGH (ref 70–99)
HCG SERPL-ACNC: 0.3 MIU/ML — SIGNIFICANT CHANGE UP
HCT VFR BLD CALC: 39.1 % — SIGNIFICANT CHANGE UP (ref 34.5–45)
HGB BLD-MCNC: 12.7 G/DL — SIGNIFICANT CHANGE UP (ref 11.5–15.5)
INR BLD: 1.11 — SIGNIFICANT CHANGE UP (ref 0.88–1.16)
MAGNESIUM SERPL-MCNC: 2 MG/DL — SIGNIFICANT CHANGE UP (ref 1.6–2.6)
MCHC RBC-ENTMCNC: 26.5 PG — LOW (ref 27–34)
MCHC RBC-ENTMCNC: 32.5 G/DL — SIGNIFICANT CHANGE UP (ref 32–36)
MCV RBC AUTO: 81.5 FL — SIGNIFICANT CHANGE UP (ref 80–100)
PHOSPHATE SERPL-MCNC: 3.5 MG/DL — SIGNIFICANT CHANGE UP (ref 2.5–4.5)
PLATELET # BLD AUTO: 392 K/UL — SIGNIFICANT CHANGE UP (ref 150–400)
POTASSIUM SERPL-MCNC: 3.8 MMOL/L — SIGNIFICANT CHANGE UP (ref 3.5–5.3)
POTASSIUM SERPL-SCNC: 3.8 MMOL/L — SIGNIFICANT CHANGE UP (ref 3.5–5.3)
PROTHROM AB SERPL-ACNC: 12.3 SEC — SIGNIFICANT CHANGE UP (ref 9.8–12.7)
RBC # BLD: 4.8 M/UL — SIGNIFICANT CHANGE UP (ref 3.8–5.2)
RBC # FLD: 16.8 % — SIGNIFICANT CHANGE UP (ref 10.3–16.9)
SODIUM SERPL-SCNC: 140 MMOL/L — SIGNIFICANT CHANGE UP (ref 135–145)
WBC # BLD: 9.8 K/UL — SIGNIFICANT CHANGE UP (ref 3.8–10.5)
WBC # FLD AUTO: 9.8 K/UL — SIGNIFICANT CHANGE UP (ref 3.8–10.5)

## 2018-03-20 RX ORDER — SODIUM CHLORIDE 9 MG/ML
1000 INJECTION, SOLUTION INTRAVENOUS
Qty: 0 | Refills: 0 | Status: DISCONTINUED | OUTPATIENT
Start: 2018-03-20 | End: 2018-03-21

## 2018-03-20 RX ORDER — HYDROMORPHONE HYDROCHLORIDE 2 MG/ML
0.5 INJECTION INTRAMUSCULAR; INTRAVENOUS; SUBCUTANEOUS
Qty: 0 | Refills: 0 | Status: DISCONTINUED | OUTPATIENT
Start: 2018-03-20 | End: 2018-03-21

## 2018-03-20 RX ORDER — INSULIN LISPRO 100/ML
VIAL (ML) SUBCUTANEOUS
Qty: 0 | Refills: 0 | Status: DISCONTINUED | OUTPATIENT
Start: 2018-03-20 | End: 2018-03-21

## 2018-03-20 RX ORDER — IBUPROFEN 200 MG
400 TABLET ORAL ONCE
Qty: 0 | Refills: 0 | Status: COMPLETED | OUTPATIENT
Start: 2018-03-20 | End: 2018-03-20

## 2018-03-20 RX ORDER — HEPARIN SODIUM 5000 [USP'U]/ML
7500 INJECTION INTRAVENOUS; SUBCUTANEOUS EVERY 8 HOURS
Qty: 0 | Refills: 0 | Status: DISCONTINUED | OUTPATIENT
Start: 2018-03-20 | End: 2018-03-21

## 2018-03-20 RX ORDER — DEXTROSE 50 % IN WATER 50 %
25 SYRINGE (ML) INTRAVENOUS ONCE
Qty: 0 | Refills: 0 | Status: DISCONTINUED | OUTPATIENT
Start: 2018-03-20 | End: 2018-03-21

## 2018-03-20 RX ORDER — HYDROMORPHONE HYDROCHLORIDE 2 MG/ML
1 INJECTION INTRAMUSCULAR; INTRAVENOUS; SUBCUTANEOUS ONCE
Qty: 0 | Refills: 0 | Status: DISCONTINUED | OUTPATIENT
Start: 2018-03-20 | End: 2018-03-20

## 2018-03-20 RX ORDER — HYDROMORPHONE HYDROCHLORIDE 2 MG/ML
1 INJECTION INTRAMUSCULAR; INTRAVENOUS; SUBCUTANEOUS
Qty: 0 | Refills: 0 | Status: DISCONTINUED | OUTPATIENT
Start: 2018-03-20 | End: 2018-03-21

## 2018-03-20 RX ADMIN — HYDROMORPHONE HYDROCHLORIDE 1 MILLIGRAM(S): 2 INJECTION INTRAMUSCULAR; INTRAVENOUS; SUBCUTANEOUS at 16:12

## 2018-03-20 RX ADMIN — Medication 400 MILLIGRAM(S): at 22:47

## 2018-03-20 RX ADMIN — HYDROMORPHONE HYDROCHLORIDE 1 MILLIGRAM(S): 2 INJECTION INTRAMUSCULAR; INTRAVENOUS; SUBCUTANEOUS at 06:37

## 2018-03-20 RX ADMIN — HYDROMORPHONE HYDROCHLORIDE 1 MILLIGRAM(S): 2 INJECTION INTRAMUSCULAR; INTRAVENOUS; SUBCUTANEOUS at 21:44

## 2018-03-20 RX ADMIN — HYDROMORPHONE HYDROCHLORIDE 1 MILLIGRAM(S): 2 INJECTION INTRAMUSCULAR; INTRAVENOUS; SUBCUTANEOUS at 10:38

## 2018-03-20 RX ADMIN — HEPARIN SODIUM 7500 UNIT(S): 5000 INJECTION INTRAVENOUS; SUBCUTANEOUS at 06:21

## 2018-03-20 RX ADMIN — HEPARIN SODIUM 7500 UNIT(S): 5000 INJECTION INTRAVENOUS; SUBCUTANEOUS at 22:00

## 2018-03-20 RX ADMIN — Medication 100 MILLIGRAM(S): at 22:00

## 2018-03-20 RX ADMIN — HYDROMORPHONE HYDROCHLORIDE 1 MILLIGRAM(S): 2 INJECTION INTRAMUSCULAR; INTRAVENOUS; SUBCUTANEOUS at 18:27

## 2018-03-20 RX ADMIN — HYDROMORPHONE HYDROCHLORIDE 1 MILLIGRAM(S): 2 INJECTION INTRAMUSCULAR; INTRAVENOUS; SUBCUTANEOUS at 21:29

## 2018-03-20 RX ADMIN — HYDROMORPHONE HYDROCHLORIDE 1 MILLIGRAM(S): 2 INJECTION INTRAMUSCULAR; INTRAVENOUS; SUBCUTANEOUS at 17:29

## 2018-03-20 RX ADMIN — HYDROMORPHONE HYDROCHLORIDE 1 MILLIGRAM(S): 2 INJECTION INTRAMUSCULAR; INTRAVENOUS; SUBCUTANEOUS at 14:00

## 2018-03-20 RX ADMIN — Medication 400 MILLIGRAM(S): at 23:47

## 2018-03-20 RX ADMIN — Medication 100 MILLIGRAM(S): at 06:21

## 2018-03-20 RX ADMIN — HYDROMORPHONE HYDROCHLORIDE 1 MILLIGRAM(S): 2 INJECTION INTRAMUSCULAR; INTRAVENOUS; SUBCUTANEOUS at 06:27

## 2018-03-20 NOTE — BRIEF OPERATIVE NOTE - OPERATION/FINDINGS
Preop Dx: Chronic fistula, infected Mesh  Postop Dx: Same as above  Procedure: Abdominal Fistulectomy, Drainage of preperitoneal abscess, explant of infected mesh, repair with biologic mesh  Findings: Fistula excised, preperitoneal abscess noted and drained. Cultures obtained. Mesh explanted. Wound pulse irrigated. Defect repaired with [id number] Xen Matrix Biologic Mesh below the anterior rectus sheath. Anterior rectus sheath partially closed over the mesh. VAC placed
vac removed, washout of wound with pulse .  closure of fascia over priorly placed biologic mesh.  replacement of wound vac

## 2018-03-20 NOTE — PROGRESS NOTE ADULT - ASSESSMENT
39 yo female with hx of ventral hernia repair 1/2017 complicated by wound infection that required IR drainage 2/2017, presents with chronic drainage from the wound since October 2017. Patient has never followed up with surgeon as outpatient.       Reg  ISS  pain control   SQH/SCDs/OOBA/IS  AM labs 37 yo female with hx of ventral hernia repair 1/2017 complicated by wound infection that required IR drainage 2/2017, presents with chronic drainage from the wound since October 2017. Patient has never followed up with surgeon as outpatient.       NPO	  ISS  pain control   SQH/SCDs/OOBA/IS  AM labs  OR TODAY

## 2018-03-20 NOTE — BRIEF OPERATIVE NOTE - PRE-OP DX
Fistula  03/14/2018  Infected Mesh Granuloma  Active  Valeriano Shah
Fistula  03/14/2018  Infected Mesh Granuloma  Active  Valeriano Shah

## 2018-03-20 NOTE — PROGRESS NOTE ADULT - SUBJECTIVE AND OBJECTIVE BOX
POST-OPERATIVE NOTE    Procedure: Wound vas replaced       Surgeon: Dr. Hurtado    S: Pt has no complaints. Denies CP, SOB, FORBES, calf tenderness. Pain controlled with medication.    O:  T(C): 36.1 (03-20-18 @ 13:35), Max: 36.1 (03-20-18 @ 13:35)  T(F): 97 (03-20-18 @ 13:35), Max: 97 (03-20-18 @ 13:35)  HR: 81 (03-20-18 @ 14:30) (52 - 81)  BP: 112/59 (03-20-18 @ 14:30) (112/59 - 203/104)  RR: 16 (03-20-18 @ 14:30) (16 - 18)  SpO2: 97% (03-20-18 @ 14:30) (97% - 99%)  Wt(kg): --                        12.7   9.8   )-----------( 392      ( 20 Mar 2018 07:14 )             39.1     03-20    140  |  100  |  7   ----------------------------<  114<H>  3.8   |  29  |  0.71    Ca    9.3      20 Mar 2018 07:14  Phos  3.5     03-20  Mg     2.0     03-20        Gen: NAD, resting comfortably in bed  C/V: NSR  Pulm: Nonlabored breathing, no respiratory distress  Abd: soft,  TTP around wound vac , wound vac functioning   Extrem: WWP, no calf edema, SCDs in place      A/P: 38yFemale s/p above procedure  Regular diet  Pain/nausea control  f/u am labs  OOB/AMB SCD'S

## 2018-03-20 NOTE — BRIEF OPERATIVE NOTE - POST-OP DX
Fistula  03/14/2018  Infected Mesh  Active  Valeriano Shah
Fistula  03/14/2018  Infected Mesh  Active  Valeriano Shah

## 2018-03-20 NOTE — PROGRESS NOTE ADULT - SUBJECTIVE AND OBJECTIVE BOX
O/N; VSS. FAUSTINO  3/19: VAC reinforced. OR tomorrow. Preop'd and NPO after midnight. VSS OVERNIGHT EVENTS: VSS. FAUSTINO  3/19: VAC reinforced. OR tomorrow. Preop'd and NPO after midnight. VSS          SUBJECTIVE: Patient complaining incisional pain  Flatus: [X] YES [] NO             Bowel Movement: [X ] YES [ ] NO  Pain (0-10):            Pain Control Adequate: [ ] YES [X ] NO  Nausea: [ ] YES [X ] NO            Vomiting: [ ] YES [X ] NO  Diarrhea: [ ] YES [X ] NO         Constipation: [ ] YES [X ] NO     Chest Pain: [ ] YES [X ] NO    SOB:  [ ] YES [ X] NO    clindamycin IVPB 600 milliGRAM(s) IV Intermittent every 8 hours  heparin  Injectable 7500 Unit(s) SubCutaneous every 8 hours      Vital Signs Last 24 Hrs  T(C): 36.5 (20 Mar 2018 05:34), Max: 36.7 (19 Mar 2018 17:47)  T(F): 97.7 (20 Mar 2018 05:34), Max: 98 (19 Mar 2018 17:47)  HR: 71 (20 Mar 2018 05:34) (71 - 82)  BP: 166/91 (20 Mar 2018 05:34) (148/82 - 166/91)  BP(mean): --  RR: 16 (20 Mar 2018 05:34) (16 - 18)  SpO2: 99% (20 Mar 2018 05:34) (97% - 100%)  I&O's Detail    19 Mar 2018 07:01  -  20 Mar 2018 07:00  --------------------------------------------------------  IN:    lactated ringers.: 1375 mL    lactated ringers.: 50 mL    Oral Fluid: 300 mL  Total IN: 1725 mL    OUT:    Drain: 50 mL    Voided: 400 mL  Total OUT: 450 mL    Total NET: 1275 mL          General: NAD, resting comfortably in bed  C/V: NSR  Pulm: Nonlabored breathing, no respiratory distress  Abd: soft, TTP around wound vac site , wound vac functioning   Extrem: WWP, no edema, SCDs in place      LABS:                        12.7   9.8   )-----------( 392      ( 20 Mar 2018 07:14 )             39.1     03-20    140  |  100  |  7   ----------------------------<  114<H>  3.8   |  29  |  0.71    Ca    9.3      20 Mar 2018 07:14  Phos  3.5     03-20  Mg     2.0     03-20      PT/INR - ( 20 Mar 2018 07:14 )   PT: 12.3 sec;   INR: 1.11          PTT - ( 20 Mar 2018 07:14 )  PTT:33.6 sec

## 2018-03-21 ENCOUNTER — TRANSCRIPTION ENCOUNTER (OUTPATIENT)
Age: 38
End: 2018-03-21

## 2018-03-21 VITALS
SYSTOLIC BLOOD PRESSURE: 136 MMHG | DIASTOLIC BLOOD PRESSURE: 84 MMHG | RESPIRATION RATE: 16 BRPM | HEART RATE: 74 BPM | OXYGEN SATURATION: 98 %

## 2018-03-21 LAB
ANION GAP SERPL CALC-SCNC: 12 MMOL/L — SIGNIFICANT CHANGE UP (ref 5–17)
BUN SERPL-MCNC: 6 MG/DL — LOW (ref 7–23)
CALCIUM SERPL-MCNC: 8.5 MG/DL — SIGNIFICANT CHANGE UP (ref 8.4–10.5)
CHLORIDE SERPL-SCNC: 99 MMOL/L — SIGNIFICANT CHANGE UP (ref 96–108)
CO2 SERPL-SCNC: 27 MMOL/L — SIGNIFICANT CHANGE UP (ref 22–31)
CREAT SERPL-MCNC: 0.66 MG/DL — SIGNIFICANT CHANGE UP (ref 0.5–1.3)
GLUCOSE BLDC GLUCOMTR-MCNC: 125 MG/DL — HIGH (ref 70–99)
GLUCOSE BLDC GLUCOMTR-MCNC: 142 MG/DL — HIGH (ref 70–99)
GLUCOSE SERPL-MCNC: 137 MG/DL — HIGH (ref 70–99)
HBA1C BLD-MCNC: 6.8 % — HIGH (ref 4–5.6)
HCT VFR BLD CALC: 36.6 % — SIGNIFICANT CHANGE UP (ref 34.5–45)
HGB BLD-MCNC: 11.5 G/DL — SIGNIFICANT CHANGE UP (ref 11.5–15.5)
MAGNESIUM SERPL-MCNC: 2 MG/DL — SIGNIFICANT CHANGE UP (ref 1.6–2.6)
MCHC RBC-ENTMCNC: 25.6 PG — LOW (ref 27–34)
MCHC RBC-ENTMCNC: 31.4 G/DL — LOW (ref 32–36)
MCV RBC AUTO: 81.3 FL — SIGNIFICANT CHANGE UP (ref 80–100)
PHOSPHATE SERPL-MCNC: 3.7 MG/DL — SIGNIFICANT CHANGE UP (ref 2.5–4.5)
PLATELET # BLD AUTO: 357 K/UL — SIGNIFICANT CHANGE UP (ref 150–400)
POTASSIUM SERPL-MCNC: 3.8 MMOL/L — SIGNIFICANT CHANGE UP (ref 3.5–5.3)
POTASSIUM SERPL-SCNC: 3.8 MMOL/L — SIGNIFICANT CHANGE UP (ref 3.5–5.3)
RBC # BLD: 4.5 M/UL — SIGNIFICANT CHANGE UP (ref 3.8–5.2)
RBC # FLD: 16.7 % — SIGNIFICANT CHANGE UP (ref 10.3–16.9)
SODIUM SERPL-SCNC: 138 MMOL/L — SIGNIFICANT CHANGE UP (ref 135–145)
WBC # BLD: 8.3 K/UL — SIGNIFICANT CHANGE UP (ref 3.8–10.5)
WBC # FLD AUTO: 8.3 K/UL — SIGNIFICANT CHANGE UP (ref 3.8–10.5)

## 2018-03-21 RX ORDER — OXYCODONE AND ACETAMINOPHEN 5; 325 MG/1; MG/1
1 TABLET ORAL ONCE
Qty: 0 | Refills: 0 | Status: COMPLETED | OUTPATIENT
Start: 2018-03-21 | End: 2018-03-21

## 2018-03-21 RX ORDER — DOCUSATE SODIUM 100 MG
1 CAPSULE ORAL
Qty: 20 | Refills: 0 | OUTPATIENT
Start: 2018-03-21 | End: 2018-03-30

## 2018-03-21 RX ORDER — OXYCODONE AND ACETAMINOPHEN 5; 325 MG/1; MG/1
2 TABLET ORAL EVERY 4 HOURS
Qty: 0 | Refills: 0 | Status: DISCONTINUED | OUTPATIENT
Start: 2018-03-21 | End: 2018-03-21

## 2018-03-21 RX ORDER — LABETALOL HCL 100 MG
10 TABLET ORAL ONCE
Qty: 0 | Refills: 0 | Status: COMPLETED | OUTPATIENT
Start: 2018-03-21 | End: 2018-03-21

## 2018-03-21 RX ORDER — ONDANSETRON 8 MG/1
4 TABLET, FILM COATED ORAL ONCE
Qty: 0 | Refills: 0 | Status: COMPLETED | OUTPATIENT
Start: 2018-03-21 | End: 2018-03-21

## 2018-03-21 RX ORDER — IBUPROFEN 200 MG
1 TABLET ORAL
Qty: 20 | Refills: 0 | OUTPATIENT
Start: 2018-03-21 | End: 2018-03-25

## 2018-03-21 RX ORDER — OXYCODONE AND ACETAMINOPHEN 5; 325 MG/1; MG/1
1 TABLET ORAL EVERY 4 HOURS
Qty: 0 | Refills: 0 | Status: DISCONTINUED | OUTPATIENT
Start: 2018-03-21 | End: 2018-03-21

## 2018-03-21 RX ORDER — ONDANSETRON 8 MG/1
1 TABLET, FILM COATED ORAL
Qty: 12 | Refills: 0 | OUTPATIENT
Start: 2018-03-21 | End: 2018-03-24

## 2018-03-21 RX ADMIN — OXYCODONE AND ACETAMINOPHEN 2 TABLET(S): 5; 325 TABLET ORAL at 13:39

## 2018-03-21 RX ADMIN — HYDROMORPHONE HYDROCHLORIDE 1 MILLIGRAM(S): 2 INJECTION INTRAMUSCULAR; INTRAVENOUS; SUBCUTANEOUS at 06:45

## 2018-03-21 RX ADMIN — ONDANSETRON 4 MILLIGRAM(S): 8 TABLET, FILM COATED ORAL at 10:36

## 2018-03-21 RX ADMIN — HYDROMORPHONE HYDROCHLORIDE 1 MILLIGRAM(S): 2 INJECTION INTRAMUSCULAR; INTRAVENOUS; SUBCUTANEOUS at 03:18

## 2018-03-21 RX ADMIN — HYDROMORPHONE HYDROCHLORIDE 1 MILLIGRAM(S): 2 INJECTION INTRAMUSCULAR; INTRAVENOUS; SUBCUTANEOUS at 06:30

## 2018-03-21 RX ADMIN — HYDROMORPHONE HYDROCHLORIDE 1 MILLIGRAM(S): 2 INJECTION INTRAMUSCULAR; INTRAVENOUS; SUBCUTANEOUS at 02:57

## 2018-03-21 RX ADMIN — HYDROMORPHONE HYDROCHLORIDE 1 MILLIGRAM(S): 2 INJECTION INTRAMUSCULAR; INTRAVENOUS; SUBCUTANEOUS at 09:41

## 2018-03-21 RX ADMIN — Medication 10 MILLIGRAM(S): at 01:20

## 2018-03-21 RX ADMIN — HEPARIN SODIUM 7500 UNIT(S): 5000 INJECTION INTRAVENOUS; SUBCUTANEOUS at 06:00

## 2018-03-21 RX ADMIN — HYDROMORPHONE HYDROCHLORIDE 1 MILLIGRAM(S): 2 INJECTION INTRAMUSCULAR; INTRAVENOUS; SUBCUTANEOUS at 10:08

## 2018-03-21 RX ADMIN — Medication 100 MILLIGRAM(S): at 06:00

## 2018-03-21 NOTE — PROGRESS NOTE ADULT - ASSESSMENT
39 yo female with hx of ventral hernia repair 1/2017 complicated by wound infection that required IR drainage 2/2017, presents with chronic drainage from the wound since October 2017. Patient has never followed up with surgeon as outpatient.       Reg  ISS  pain control   SQH/SCDs/OOBA/IS  AM labs 37 yo female with hx of ventral hernia repair 1/2017 complicated by wound infection that required IR drainage 2/2017, presents with chronic drainage from the wound since October 2017. Patient has never followed up with surgeon as outpatient.       Reg  ISS  pain control   SQH/SCDs/OOBA/IS  AM labs

## 2018-03-21 NOTE — DISCHARGE NOTE ADULT - HOSPITAL COURSE
Ms. Katz was a 39 yo female with hx of HTN, DM, HLD, MO, prior ventral hernia repair with Dr. Hurtado (1/2017), complicated by wound infection requiring IR drainage (2/2017), also s/p laparoscopic salpingectomy on 10/2017 2/2 ectopic pregnancy. She was admitted for drainage from prior lower abdomen incision site. Patient reports she first noticed drainage in October 2017, but never followed up with surgeon/physician for that reason. She also reported intermittent fevers, chills, nausea, emesis, poor appetite. She reported drainage as serous sometimes bloody or greenish. On 3/14/18, she underwent abdominal fistulectomy of chronic fistula, explant of infected mesh, irrigation, repair with biologic mesh and placement of wound VAC. On 3/20/18, she underwent removal of VAC, washout of women, closure of fascia over previously place biologic mesh, and replacement of wound VAC. Patient was discharged in stable condition with vitals within normal limits and also with approval for home wound VAC services and needs.

## 2018-03-21 NOTE — DISCHARGE NOTE ADULT - PATIENT PORTAL LINK FT
You can access the [facility] Patient Portal, offered by [facility], by registering with the following website: [email or web address]

## 2018-03-21 NOTE — DISCHARGE NOTE ADULT - CARE PROVIDER_API CALL
Hema Hurtado), Surgery  [address] [place] [address]  Phone: [phone]  Fax: [phone]

## 2018-03-21 NOTE — PROGRESS NOTE ADULT - SUBJECTIVE AND OBJECTIVE BOX
O/N: passed TOV, BP eleveated 10mg labetalol given. remaining VSS.  3/20:  Wound replaced in the OR, POC WNL, VSS OVERNIGHT EVENTS: passed TOV, BP eleveated 10mg labetalol given. remaining VSS.  3/20:  Wound replaced in the OR, POC WNL, VSS   OVERNIGHT EVENTS:    3/15:Abdominal Fistulectomy, Drainage of preperitoneal abscess, explant of infected mesh, repair with biologic mesh  3:20: Wound vac removed abdominal wash. Closure of fascia over priorly placed biologic mesh. Wound vac replaced        SUBJECTIVE: Patient examined bedside complains of pain around vac site.  Flatus: [] YES [X] NO             Bowel Movement: [ ] YES [X ] NO  Pain (0-10):            Pain Control Adequate: [ ] YES [ ] NO  Nausea: [ ] YES [X ] NO            Vomiting: [ ] YES [X ] NO  Diarrhea: [ ] YES [X ] NO         Constipation: [ ] YES [ X] NO     Chest Pain: [ ] YES [X ] NO    SOB:  [ ] YES [X ] NO    clindamycin IVPB 600 milliGRAM(s) IV Intermittent every 8 hours  heparin  Injectable 7500 Unit(s) SubCutaneous every 8 hours      Vital Signs Last 24 Hrs  T(C): 36.1 (21 Mar 2018 05:46), Max: 37.1 (20 Mar 2018 21:29)  T(F): 97 (21 Mar 2018 05:46), Max: 98.7 (20 Mar 2018 21:29)  HR: 71 (21 Mar 2018 05:46) (52 - 82)  BP: 130/79 (21 Mar 2018 05:46) (112/59 - 203/104)  BP(mean): --  RR: 17 (21 Mar 2018 05:46) (16 - 18)  SpO2: 99% (21 Mar 2018 05:46) (97% - 99%)  I&O's Detail    20 Mar 2018 07:01  -  21 Mar 2018 07:00  --------------------------------------------------------  IN:    lactated ringers.: 1249 mL    Oral Fluid: 220 mL    Solution: 100 mL  Total IN: 1569 mL    OUT:    Drain: 5 mL    Voided: 300 mL  Total OUT: 305 mL    Total NET: 1264 mL      21 Mar 2018 07:01  -  21 Mar 2018 07:41  --------------------------------------------------------  IN:    lactated ringers.: 125 mL  Total IN: 125 mL    OUT:  Total OUT: 0 mL    Total NET: 125 mL          General: NAD, resting comfortably in bed  C/V: NSR  Pulm: Nonlabored breathing, no respiratory distress  Abd: Soft, non-distended, TTP around wound vac site, Wound vac functioning   Extrem: WWP, no edema, SCDs in place        LABS:                        12.7   9.8   )-----------( 392      ( 20 Mar 2018 07:14 )             39.1     03-20    140  |  100  |  7   ----------------------------<  114<H>  3.8   |  29  |  0.71    Ca    9.3      20 Mar 2018 07:14  Phos  3.5     03-20  Mg     2.0     03-20      PT/INR - ( 20 Mar 2018 07:14 )   PT: 12.3 sec;   INR: 1.11          PTT - ( 20 Mar 2018 07:14 )  PTT:33.6 sec

## 2018-03-21 NOTE — CHART NOTE - NSCHARTNOTEFT_GEN_A_CORE
Admitting Diagnosis:   Patient is a 38y old  Female who presents with a chief complaint of abdominal pain, draining wound (21 Mar 2018 11:20)      PAST MEDICAL & SURGICAL HISTORY:  Abdominal hernia  Obesity  Diabetes  Hyperlipidemia  Essential hypertension  H/O ventral hernia repair  H/O:   History of D&C  H/O LEEP      Current Nutrition Order: CSTCHO    PO Intake: Good (%) [   ]  Fair (50-75%) [   ] Poor (<25%) [ x  ]  Minimal intake 2/2 nausea and pain.    GI Issues:  C/o nausea. Pt had just been given zofran prior to assessment    Pain: C/o pain- being medically managed.    Skin Integrity: new wound vac placed 3/20    Labs:       138  |  99  |  6<L>  ----------------------------<  137<H>  3.8   |  27  |  0.66    Ca    8.5      21 Mar 2018 08:20  Phos  3.7       Mg     2.0           CAPILLARY BLOOD GLUCOSE      POCT Blood Glucose.: 142 mg/dL (21 Mar 2018 07:40)  POCT Blood Glucose.: 164 mg/dL (20 Mar 2018 21:35)  POCT Blood Glucose.: 140 mg/dL (20 Mar 2018 16:46)  POCT Blood Glucose.: 137 mg/dL (20 Mar 2018 14:34)      Medications:  MEDICATIONS  (STANDING):  clindamycin IVPB 600 milliGRAM(s) IV Intermittent every 8 hours  dextrose 50% Injectable 25 Gram(s) IV Push once  heparin  Injectable 7500 Unit(s) SubCutaneous every 8 hours  insulin lispro (HumaLOG) corrective regimen sliding scale   SubCutaneous Before meals and at bedtime  lactated ringers. 1000 milliLiter(s) (125 mL/Hr) IV Continuous <Continuous>    MEDICATIONS  (PRN):  oxyCODONE    5 mG/acetaminophen 325 mG 1 Tablet(s) Oral every 4 hours PRN Moderate Pain (4 - 6)  oxyCODONE    5 mG/acetaminophen 325 mG 2 Tablet(s) Oral every 4 hours PRN Severe Pain (7 - 10)      Weight: 230lb (3/20)  Daily     Daily     Weight Change:   Wt appears to be stable    Estimated energy needs:   IBW used for calculations as pt >120% of IBW   Nutrient needs based on [facility] standards of care for maintenance in adults.   needs adjusted 2/2 wound  1530-1836kcal/day (25-30kcal/kg)  61-74g pro/day (1-1.2g/kg)  1530-1836ml fluid/day (25-30ml/kg)    Subjective:   Went to OR yesterday (3/20): s/p wound vac removal and abdominal wash, closure of fascia over priorly placed biologic mesh & wound vac was replaced. Pt seen sitting in chair in apparent discomfort. Diet now CSTCHO. RD observed 0% of breakfast tray completed. Pt's appetite has remained consistently poor. Discussed importance of meeting nutrient needs post-op d/t compromised skin integrity and wound healing. Discussed option of oral nutrition supplements to which pt was agreeable (see recs below). pt feel she has lost weight during admission however per pre-op report wt remains stable. C/o nausea and pain- being medically managed, RN aware.    Previous Nutrition Diagnosis:  inadequate oral intake RT lack of appetite 2/2 pain and nausea AEB consuming <25% kenneth  Active [  x ]  Resolved [   ]    If resolved, new PES:     Goal: Pt to meet >50% estimated needs PO    Recommendations:  1) EnsureEnlive TID (1050kcal, 60g pro)  2) MVI daily  3) Encourage intake at meals/optimize protein options  4) trend weights    Education:   Pt understanding of increased and purpose of ONS  Risk Level: High [  x ] Moderate [   ] Low [   ]

## 2018-03-21 NOTE — DISCHARGE NOTE ADULT - PLAN OF CARE
Follow up Follow up with Dr. Hurtado in 1-2 weeks. Call the office at the number below to schedule your appointment. You may shower; soap and water over incision sites. Do not scrub. Pat dry when done. No tub bathing or swimming until cleared. Keep incision sites out of the sun as scars will darken. Ambulate as tolerated, but no heavy lifting (>10lbs) or strenuous exercise. You may resume regular diet. You should be urinating at least 3-4x per day. Call the office if you experience increasing abdominal pain, nausea, vomiting, or temperature >101 F.    Please call [facility] at [phone] to schedule an appointment with the dentist.

## 2018-03-21 NOTE — DISCHARGE NOTE ADULT - MEDICATION SUMMARY - MEDICATIONS TO TAKE
I will START or STAY ON the medications listed below when I get home from the hospital:    oxyCODONE-acetaminophen 5 mg-325 mg oral tablet  -- 1 tab(s) by mouth every 6 hours MDD:4  -- Caution federal law prohibits the transfer of this drug to any person other  than the person for whom it was prescribed.  May cause drowsiness.  Alcohol may intensify this effect.  Use care when operating dangerous machinery.  This prescription cannot be refilled.  This product contains acetaminophen.  Do not use  with any other product containing acetaminophen to prevent possible liver damage.  Using more of this medication than prescribed may cause serious breathing problems.    -- Indication: For for severe pain    Colace 100 mg oral capsule  -- 1 cap(s) by mouth 2 times a day MDD:2  -- Medication should be taken with plenty of water.    -- Indication: For to prevent constipation I will START or STAY ON the medications listed below when I get home from the hospital:    oxyCODONE-acetaminophen 5 mg-325 mg oral tablet  -- 1 tab(s) by mouth every 6 hours MDD:4  -- Caution federal law prohibits the transfer of this drug to any person other  than the person for whom it was prescribed.  May cause drowsiness.  Alcohol may intensify this effect.  Use care when operating dangerous machinery.  This prescription cannot be refilled.  This product contains acetaminophen.  Do not use  with any other product containing acetaminophen to prevent possible liver damage.  Using more of this medication than prescribed may cause serious breathing problems.    -- Indication: For for severe pain    ibuprofen 400 mg oral tablet  -- 1 tab(s) by mouth every 6 hours MDD:4 as needed for pain   -- Do not take this drug if you are pregnant.  It is very important that you take or use this exactly as directed.  Do not skip doses or discontinue unless directed by your doctor.  May cause drowsiness or dizziness.  Obtain medical advice before taking any non-prescription drugs as some may affect the action of this medication.  Take with food or milk.    -- Indication: For pain    Zofran 4 mg oral tablet  -- 1 tab(s) by mouth every 8 hours, As Needed -for nausea MDD:3   -- Indication: For Nausea     Colace 100 mg oral capsule  -- 1 cap(s) by mouth 2 times a day MDD:2  -- Medication should be taken with plenty of water.    -- Indication: For to prevent constipation

## 2018-03-21 NOTE — DISCHARGE NOTE ADULT - CARE PLAN
Principal Discharge DX:	Wound infection after surgery, initial encounter  Goal:	Follow up  Assessment and plan of treatment:	Follow up with Dr. Hurtado in 1-2 weeks. Call the office at the number below to schedule your appointment. You may shower; soap and water over incision sites. Do not scrub. Pat dry when done. No tub bathing or swimming until cleared. Keep incision sites out of the sun as scars will darken. Ambulate as tolerated, but no heavy lifting (>10lbs) or strenuous exercise. You may resume regular diet. You should be urinating at least 3-4x per day. Call the office if you experience increasing abdominal pain, nausea, vomiting, or temperature >101 F.    Please call [facility] at [phone] to schedule an appointment with the dentist.

## 2018-03-22 LAB — SURGICAL PATHOLOGY STUDY: SIGNIFICANT CHANGE UP

## 2018-03-27 DIAGNOSIS — L02.211 CUTANEOUS ABSCESS OF ABDOMINAL WALL: ICD-10-CM

## 2018-03-27 DIAGNOSIS — E66.9 OBESITY, UNSPECIFIED: ICD-10-CM
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
## 2018-03-27 DIAGNOSIS — T85.79XA INFECTION AND INFLAMMATORY REACTION DUE TO OTHER INTERNAL PROSTHETIC DEVICES, IMPLANTS AND GRAFTS, INITIAL ENCOUNTER: ICD-10-CM

## 2018-03-27 DIAGNOSIS — E11.9 TYPE 2 DIABETES MELLITUS WITHOUT COMPLICATIONS: ICD-10-CM

## 2018-03-27 DIAGNOSIS — E78.5 HYPERLIPIDEMIA, UNSPECIFIED: ICD-10-CM

## 2018-03-27 DIAGNOSIS — G43.909 MIGRAINE, UNSPECIFIED, NOT INTRACTABLE, WITHOUT STATUS MIGRAINOSUS: ICD-10-CM

## 2018-03-27 DIAGNOSIS — I10 ESSENTIAL (PRIMARY) HYPERTENSION: ICD-10-CM

## 2018-03-27 DIAGNOSIS — Z91.013 ALLERGY TO SEAFOOD: ICD-10-CM

## 2018-03-27 DIAGNOSIS — Z88.1 ALLERGY STATUS TO OTHER ANTIBIOTIC AGENTS STATUS: ICD-10-CM

## 2018-03-27 DIAGNOSIS — F17.210 NICOTINE DEPENDENCE, CIGARETTES, UNCOMPLICATED: ICD-10-CM

## 2018-03-27 DIAGNOSIS — L98.8 OTHER SPECIFIED DISORDERS OF THE SKIN AND SUBCUTANEOUS TISSUE: ICD-10-CM

## 2018-03-27 DIAGNOSIS — Z88.0 ALLERGY STATUS TO PENICILLIN: ICD-10-CM

## 2018-03-27 DIAGNOSIS — Z98.890 OTHER SPECIFIED POSTPROCEDURAL STATES: ICD-10-CM

## 2018-04-13 VITALS
OXYGEN SATURATION: 99 % | SYSTOLIC BLOOD PRESSURE: 151 MMHG | RESPIRATION RATE: 18 BRPM | HEART RATE: 84 BPM | TEMPERATURE: 98 F | DIASTOLIC BLOOD PRESSURE: 86 MMHG

## 2018-04-13 PROCEDURE — 99285 EMERGENCY DEPT VISIT HI MDM: CPT | Mod: 25

## 2018-04-13 NOTE — ED ADULT TRIAGE NOTE - ARRIVAL INFO ADDITIONAL COMMENTS
pt states she has a wound drain from an abd mesh repair and the wound care nurse removed it today because it was not draining and she thought it was infected.  drain has been in for 3 weeks.

## 2018-04-13 NOTE — ED ADULT NURSE NOTE - CHPI ED SYMPTOMS NEG
no fever/no purulent drainage/no red streaks/no bleeding/no inflammation/no redness/no chills/no bleeding at site

## 2018-04-14 ENCOUNTER — INPATIENT (INPATIENT)
Facility: HOSPITAL | Age: 38
LOS: 7 days | Discharge: HOME HEALTH | DRG: 920 | End: 2018-04-22
Attending: SURGERY | Admitting: SURGERY
Payer: COMMERCIAL

## 2018-04-14 DIAGNOSIS — Z98.89 OTHER SPECIFIED POSTPROCEDURAL STATES: Chronic | ICD-10-CM

## 2018-04-14 DIAGNOSIS — Z98.890 OTHER SPECIFIED POSTPROCEDURAL STATES: Chronic | ICD-10-CM

## 2018-04-14 LAB
ALBUMIN SERPL ELPH-MCNC: 3.8 G/DL — SIGNIFICANT CHANGE UP (ref 3.3–5)
ALP SERPL-CCNC: 101 U/L — SIGNIFICANT CHANGE UP (ref 40–120)
ALT FLD-CCNC: 6 U/L — LOW (ref 10–45)
ANION GAP SERPL CALC-SCNC: 14 MMOL/L — SIGNIFICANT CHANGE UP (ref 5–17)
AST SERPL-CCNC: 14 U/L — SIGNIFICANT CHANGE UP (ref 10–40)
BASOPHILS NFR BLD AUTO: 0.3 % — SIGNIFICANT CHANGE UP (ref 0–2)
BILIRUB SERPL-MCNC: 0.2 MG/DL — SIGNIFICANT CHANGE UP (ref 0.2–1.2)
BUN SERPL-MCNC: 10 MG/DL — SIGNIFICANT CHANGE UP (ref 7–23)
CALCIUM SERPL-MCNC: 9.4 MG/DL — SIGNIFICANT CHANGE UP (ref 8.4–10.5)
CHLORIDE SERPL-SCNC: 98 MMOL/L — SIGNIFICANT CHANGE UP (ref 96–108)
CO2 SERPL-SCNC: 25 MMOL/L — SIGNIFICANT CHANGE UP (ref 22–31)
CREAT SERPL-MCNC: 0.68 MG/DL — SIGNIFICANT CHANGE UP (ref 0.5–1.3)
EOSINOPHIL NFR BLD AUTO: 1.9 % — SIGNIFICANT CHANGE UP (ref 0–6)
GLUCOSE BLDC GLUCOMTR-MCNC: 110 MG/DL — HIGH (ref 70–99)
GLUCOSE BLDC GLUCOMTR-MCNC: 138 MG/DL — HIGH (ref 70–99)
GLUCOSE BLDC GLUCOMTR-MCNC: 176 MG/DL — HIGH (ref 70–99)
GLUCOSE BLDC GLUCOMTR-MCNC: 246 MG/DL — HIGH (ref 70–99)
GLUCOSE SERPL-MCNC: 163 MG/DL — HIGH (ref 70–99)
HCT VFR BLD CALC: 39.4 % — SIGNIFICANT CHANGE UP (ref 34.5–45)
HGB BLD-MCNC: 12.8 G/DL — SIGNIFICANT CHANGE UP (ref 11.5–15.5)
LACTATE SERPL-SCNC: 2.5 MMOL/L — HIGH (ref 0.5–2)
LYMPHOCYTES # BLD AUTO: 34.7 % — SIGNIFICANT CHANGE UP (ref 13–44)
MCHC RBC-ENTMCNC: 25.8 PG — LOW (ref 27–34)
MCHC RBC-ENTMCNC: 32.5 G/DL — SIGNIFICANT CHANGE UP (ref 32–36)
MCV RBC AUTO: 79.4 FL — LOW (ref 80–100)
MONOCYTES NFR BLD AUTO: 6 % — SIGNIFICANT CHANGE UP (ref 2–14)
NEUTROPHILS NFR BLD AUTO: 57.1 % — SIGNIFICANT CHANGE UP (ref 43–77)
PLATELET # BLD AUTO: 377 K/UL — SIGNIFICANT CHANGE UP (ref 150–400)
POTASSIUM SERPL-MCNC: 4 MMOL/L — SIGNIFICANT CHANGE UP (ref 3.5–5.3)
POTASSIUM SERPL-SCNC: 4 MMOL/L — SIGNIFICANT CHANGE UP (ref 3.5–5.3)
PROT SERPL-MCNC: 7.7 G/DL — SIGNIFICANT CHANGE UP (ref 6–8.3)
RBC # BLD: 4.96 M/UL — SIGNIFICANT CHANGE UP (ref 3.8–5.2)
RBC # FLD: 16.7 % — SIGNIFICANT CHANGE UP (ref 10.3–16.9)
SODIUM SERPL-SCNC: 137 MMOL/L — SIGNIFICANT CHANGE UP (ref 135–145)
WBC # BLD: 11.9 K/UL — HIGH (ref 3.8–10.5)
WBC # FLD AUTO: 11.9 K/UL — HIGH (ref 3.8–10.5)

## 2018-04-14 PROCEDURE — 71046 X-RAY EXAM CHEST 2 VIEWS: CPT | Mod: 26

## 2018-04-14 PROCEDURE — 74177 CT ABD & PELVIS W/CONTRAST: CPT | Mod: 26

## 2018-04-14 RX ORDER — AZTREONAM 2 G
1000 VIAL (EA) INJECTION EVERY 6 HOURS
Qty: 0 | Refills: 0 | Status: COMPLETED | OUTPATIENT
Start: 2018-04-14 | End: 2018-04-14

## 2018-04-14 RX ORDER — VANCOMYCIN HCL 1 G
1500 VIAL (EA) INTRAVENOUS ONCE
Qty: 0 | Refills: 0 | Status: COMPLETED | OUTPATIENT
Start: 2018-04-14 | End: 2018-04-14

## 2018-04-14 RX ORDER — SODIUM CHLORIDE 9 MG/ML
1000 INJECTION, SOLUTION INTRAVENOUS
Qty: 0 | Refills: 0 | Status: DISCONTINUED | OUTPATIENT
Start: 2018-04-14 | End: 2018-04-22

## 2018-04-14 RX ORDER — INSULIN LISPRO 100/ML
VIAL (ML) SUBCUTANEOUS
Qty: 0 | Refills: 0 | Status: DISCONTINUED | OUTPATIENT
Start: 2018-04-14 | End: 2018-04-22

## 2018-04-14 RX ORDER — GLUCAGON INJECTION, SOLUTION 0.5 MG/.1ML
1 INJECTION, SOLUTION SUBCUTANEOUS ONCE
Qty: 0 | Refills: 0 | Status: DISCONTINUED | OUTPATIENT
Start: 2018-04-14 | End: 2018-04-22

## 2018-04-14 RX ORDER — DEXTROSE 50 % IN WATER 50 %
1 SYRINGE (ML) INTRAVENOUS ONCE
Qty: 0 | Refills: 0 | Status: DISCONTINUED | OUTPATIENT
Start: 2018-04-14 | End: 2018-04-22

## 2018-04-14 RX ORDER — VANCOMYCIN HCL 1 G
1500 VIAL (EA) INTRAVENOUS EVERY 12 HOURS
Qty: 0 | Refills: 0 | Status: DISCONTINUED | OUTPATIENT
Start: 2018-04-14 | End: 2018-04-20

## 2018-04-14 RX ORDER — DIPHENHYDRAMINE HCL 50 MG
25 CAPSULE ORAL ONCE
Qty: 0 | Refills: 0 | Status: COMPLETED | OUTPATIENT
Start: 2018-04-14 | End: 2018-04-14

## 2018-04-14 RX ORDER — DEXTROSE 50 % IN WATER 50 %
25 SYRINGE (ML) INTRAVENOUS ONCE
Qty: 0 | Refills: 0 | Status: DISCONTINUED | OUTPATIENT
Start: 2018-04-14 | End: 2018-04-22

## 2018-04-14 RX ORDER — AZTREONAM 2 G
2000 VIAL (EA) INJECTION EVERY 8 HOURS
Qty: 0 | Refills: 0 | Status: DISCONTINUED | OUTPATIENT
Start: 2018-04-14 | End: 2018-04-20

## 2018-04-14 RX ORDER — DIPHENHYDRAMINE HCL 50 MG
50 CAPSULE ORAL ONCE
Qty: 0 | Refills: 0 | Status: COMPLETED | OUTPATIENT
Start: 2018-04-14 | End: 2018-04-14

## 2018-04-14 RX ORDER — AZTREONAM 2 G
2000 VIAL (EA) INJECTION ONCE
Qty: 0 | Refills: 0 | Status: COMPLETED | OUTPATIENT
Start: 2018-04-14 | End: 2018-04-14

## 2018-04-14 RX ORDER — HYDROCORTISONE 20 MG
200 TABLET ORAL ONCE
Qty: 0 | Refills: 0 | Status: COMPLETED | OUTPATIENT
Start: 2018-04-14 | End: 2018-04-14

## 2018-04-14 RX ORDER — AZTREONAM 2 G
1000 VIAL (EA) INJECTION EVERY 6 HOURS
Qty: 0 | Refills: 0 | Status: DISCONTINUED | OUTPATIENT
Start: 2018-04-14 | End: 2018-04-14

## 2018-04-14 RX ORDER — AZTREONAM 2 G
1000 VIAL (EA) INJECTION ONCE
Qty: 0 | Refills: 0 | Status: DISCONTINUED | OUTPATIENT
Start: 2018-04-14 | End: 2018-04-14

## 2018-04-14 RX ORDER — OXYCODONE AND ACETAMINOPHEN 5; 325 MG/1; MG/1
1 TABLET ORAL EVERY 4 HOURS
Qty: 0 | Refills: 0 | Status: DISCONTINUED | OUTPATIENT
Start: 2018-04-14 | End: 2018-04-16

## 2018-04-14 RX ORDER — HYDROMORPHONE HYDROCHLORIDE 2 MG/ML
0.5 INJECTION INTRAMUSCULAR; INTRAVENOUS; SUBCUTANEOUS ONCE
Qty: 0 | Refills: 0 | Status: DISCONTINUED | OUTPATIENT
Start: 2018-04-14 | End: 2018-04-14

## 2018-04-14 RX ORDER — DEXTROSE 50 % IN WATER 50 %
12.5 SYRINGE (ML) INTRAVENOUS ONCE
Qty: 0 | Refills: 0 | Status: DISCONTINUED | OUTPATIENT
Start: 2018-04-14 | End: 2018-04-22

## 2018-04-14 RX ORDER — MORPHINE SULFATE 50 MG/1
4 CAPSULE, EXTENDED RELEASE ORAL ONCE
Qty: 0 | Refills: 0 | Status: DISCONTINUED | OUTPATIENT
Start: 2018-04-14 | End: 2018-04-14

## 2018-04-14 RX ORDER — OXYCODONE AND ACETAMINOPHEN 5; 325 MG/1; MG/1
2 TABLET ORAL EVERY 6 HOURS
Qty: 0 | Refills: 0 | Status: DISCONTINUED | OUTPATIENT
Start: 2018-04-14 | End: 2018-04-16

## 2018-04-14 RX ADMIN — MORPHINE SULFATE 4 MILLIGRAM(S): 50 CAPSULE, EXTENDED RELEASE ORAL at 03:04

## 2018-04-14 RX ADMIN — MORPHINE SULFATE 4 MILLIGRAM(S): 50 CAPSULE, EXTENDED RELEASE ORAL at 02:34

## 2018-04-14 RX ADMIN — Medication 100 MILLIGRAM(S): at 21:17

## 2018-04-14 RX ADMIN — OXYCODONE AND ACETAMINOPHEN 1 TABLET(S): 5; 325 TABLET ORAL at 15:23

## 2018-04-14 RX ADMIN — OXYCODONE AND ACETAMINOPHEN 2 TABLET(S): 5; 325 TABLET ORAL at 06:58

## 2018-04-14 RX ADMIN — HYDROMORPHONE HYDROCHLORIDE 0.5 MILLIGRAM(S): 2 INJECTION INTRAMUSCULAR; INTRAVENOUS; SUBCUTANEOUS at 21:27

## 2018-04-14 RX ADMIN — Medication 50 MILLIGRAM(S): at 02:10

## 2018-04-14 RX ADMIN — Medication 2: at 11:39

## 2018-04-14 RX ADMIN — Medication 25 MILLIGRAM(S): at 23:00

## 2018-04-14 RX ADMIN — Medication 200 MILLIGRAM(S): at 02:05

## 2018-04-14 RX ADMIN — MORPHINE SULFATE 4 MILLIGRAM(S): 50 CAPSULE, EXTENDED RELEASE ORAL at 04:33

## 2018-04-14 RX ADMIN — HYDROMORPHONE HYDROCHLORIDE 0.5 MILLIGRAM(S): 2 INJECTION INTRAMUSCULAR; INTRAVENOUS; SUBCUTANEOUS at 21:17

## 2018-04-14 RX ADMIN — OXYCODONE AND ACETAMINOPHEN 2 TABLET(S): 5; 325 TABLET ORAL at 19:15

## 2018-04-14 RX ADMIN — OXYCODONE AND ACETAMINOPHEN 2 TABLET(S): 5; 325 TABLET ORAL at 12:24

## 2018-04-14 RX ADMIN — Medication 50 MILLIGRAM(S): at 15:16

## 2018-04-14 RX ADMIN — OXYCODONE AND ACETAMINOPHEN 1 TABLET(S): 5; 325 TABLET ORAL at 16:03

## 2018-04-14 RX ADMIN — Medication 300 MILLIGRAM(S): at 04:50

## 2018-04-14 RX ADMIN — OXYCODONE AND ACETAMINOPHEN 2 TABLET(S): 5; 325 TABLET ORAL at 18:30

## 2018-04-14 RX ADMIN — Medication 50 MILLIGRAM(S): at 03:50

## 2018-04-14 RX ADMIN — OXYCODONE AND ACETAMINOPHEN 2 TABLET(S): 5; 325 TABLET ORAL at 05:58

## 2018-04-14 RX ADMIN — Medication 250 MILLIGRAM(S): at 18:07

## 2018-04-14 RX ADMIN — Medication 50 MILLIGRAM(S): at 09:59

## 2018-04-14 RX ADMIN — MORPHINE SULFATE 4 MILLIGRAM(S): 50 CAPSULE, EXTENDED RELEASE ORAL at 01:40

## 2018-04-14 RX ADMIN — Medication 4: at 07:12

## 2018-04-14 RX ADMIN — OXYCODONE AND ACETAMINOPHEN 2 TABLET(S): 5; 325 TABLET ORAL at 12:43

## 2018-04-14 NOTE — ED PROVIDER NOTE - MEDICAL DECISION MAKING DETAILS
37 yo F with pmh of repair of incisional hernia 1/8/2017 by Dr. Hurtado, then post surgical abscess s/p IR drain placement 2/7/2017 for which she went home on IV Abx, with chronic drainage from her incision, now with worsening of her abdominal pain and new strong odor from her wound. Pt seen and evaluated by surgical team. Ct scan done and findings consistent with an abscess. IV abx started , pt recommended to be admitted for further management.

## 2018-04-14 NOTE — H&P ADULT - NSHPPHYSICALEXAM_GEN_ALL_CORE
Gen: AOx3, NAD  CV: RRR, no m/r/g  Pulm: CTABL  Abdomen: obese, very tender in lower abdomen surrounding previous incision site with erythema, purulent, malodorous drainage  Ext: WWP, no edema

## 2018-04-14 NOTE — ED PROVIDER NOTE - ATTENDING CONTRIBUTION TO CARE
38 yof pw drainage from surgical site.  no fc.    agree w/ PA, nad, soft abd, but tender over surgical site, will incorporate surg evaluation for pt care

## 2018-04-14 NOTE — H&P ADULT - HISTORY OF PRESENT ILLNESS
37 yo F with PMH of HTN, DM, HLD, MO, prior ventral hernia repair 1/2017, complicated by wound infection requiring IR drainage 2/2017, recent abdominal fistulectomy of chronic fistula, explant of infected mesh, irrigation, repair with biologic mesh and placement of wound VAC in March. Pt reports increased purulent drainage from wound and foul odor. Visiting nurse removed vac and told pt to go to ED due to increased purulent drainage and pain at previous incision site. Normal BMs, able to tolerate PO. No fevers or chills.

## 2018-04-14 NOTE — H&P ADULT - ASSESSMENT
39 yo F h/o HTN, DM, HLD, MO, prior ventral hernia repair with wound infection  -admit to regional  -vanco/aztreonam  -regular diet  -ISS  -pain/nausea control  -HSQ/SCDs  -d/w chief on call and Dr Cheng

## 2018-04-14 NOTE — H&P ADULT - NSHPLABSRESULTS_GEN_ALL_CORE
INTERPRETATION:  CT of the ABDOMEN and PELVIS with intravenous contrast   dated 4/14/2018 2:45 AM    INDICATION: Infection at incision site. Rule out abscess.    TECHNIQUE: CT of the abdomen and pelvis with intravenous and oral   contrast. Axial, sagittal, and coronal images were obtained and reviewed.    COMPARISON: CT abdomen pelvis 3/13/2018    FINDINGS:    Lower chest: Grossly clear lung bases. Questionable air pocket seen in   the left epicardial fat.    Liver: Smooth in contour. No focal mass. Portal and hepatic veins are   patent.    Biliary system: Gallbladder is normal in size. No calcified gallstones.   No biliary ductal dilatation.    Pancreas: Unremarkable.    Spleen: Unremarkable.    Adrenal glands: 1.2 cm nodule in the left adrenal gland, similar to prior   study. Unremarkable right adrenal gland..    Kidneys: Symmetric parenchymal enhancement. No renal mass. No   hydronephrosis. No renal or ureteral stone.   Urinary Bladder: Unremarkable.    Reproductive organs: Unremarkable.     Bowel/Peritoneum: Normal caliber without evidence of obstruction. No   appreciable wall thickening. Normal appendix. No ascites or extraluminal   gas.     Lymph nodes: Numerous nonenlarged retroperitoneal lymph nodes are noted.    Aorta/IVC: Normal caliber.    Abdominal wall: No hernia.    Bones/Soft tissues: No acute osseous abnormality. Multiloculated   rim-enhancing fluid collection seen in the lower pelvis measuring 9.3 x   1.3 x 6.6 cm (tvr x ap x cc). Associated adjacent fat infiltration is   noted.      IMPRESSION:   Findings compatible with multiloculated abscess in the lower pelvis at   the incision site, measuring 9.3 x 1.3 x 6.6 cm (tvr x ap x cc).    Questionable air pocket seen in the left epicardial fat. Chest x-rays is   recommended for further evaluation to rule out pneumomediastinum.

## 2018-04-14 NOTE — ED PROVIDER NOTE - GASTROINTESTINAL, MLM
Abdomen soft,  diffusely tender, odor+, no active drainage from the incision trough the dressing, pt is very uncooperative with exam,

## 2018-04-15 LAB
ANION GAP SERPL CALC-SCNC: 11 MMOL/L — SIGNIFICANT CHANGE UP (ref 5–17)
BUN SERPL-MCNC: 8 MG/DL — SIGNIFICANT CHANGE UP (ref 7–23)
CALCIUM SERPL-MCNC: 8.9 MG/DL — SIGNIFICANT CHANGE UP (ref 8.4–10.5)
CHLORIDE SERPL-SCNC: 101 MMOL/L — SIGNIFICANT CHANGE UP (ref 96–108)
CO2 SERPL-SCNC: 26 MMOL/L — SIGNIFICANT CHANGE UP (ref 22–31)
CREAT SERPL-MCNC: 0.8 MG/DL — SIGNIFICANT CHANGE UP (ref 0.5–1.3)
GLUCOSE BLDC GLUCOMTR-MCNC: 105 MG/DL — HIGH (ref 70–99)
GLUCOSE BLDC GLUCOMTR-MCNC: 123 MG/DL — HIGH (ref 70–99)
GLUCOSE BLDC GLUCOMTR-MCNC: 129 MG/DL — HIGH (ref 70–99)
GLUCOSE BLDC GLUCOMTR-MCNC: 158 MG/DL — HIGH (ref 70–99)
GLUCOSE SERPL-MCNC: 188 MG/DL — HIGH (ref 70–99)
HCT VFR BLD CALC: 39.1 % — SIGNIFICANT CHANGE UP (ref 34.5–45)
HGB BLD-MCNC: 12.2 G/DL — SIGNIFICANT CHANGE UP (ref 11.5–15.5)
MAGNESIUM SERPL-MCNC: 2 MG/DL — SIGNIFICANT CHANGE UP (ref 1.6–2.6)
MCHC RBC-ENTMCNC: 25 PG — LOW (ref 27–34)
MCHC RBC-ENTMCNC: 31.2 G/DL — LOW (ref 32–36)
MCV RBC AUTO: 80.1 FL — SIGNIFICANT CHANGE UP (ref 80–100)
PHOSPHATE SERPL-MCNC: 4.1 MG/DL — SIGNIFICANT CHANGE UP (ref 2.5–4.5)
PLATELET # BLD AUTO: 382 K/UL — SIGNIFICANT CHANGE UP (ref 150–400)
POTASSIUM SERPL-MCNC: 4.2 MMOL/L — SIGNIFICANT CHANGE UP (ref 3.5–5.3)
POTASSIUM SERPL-SCNC: 4.2 MMOL/L — SIGNIFICANT CHANGE UP (ref 3.5–5.3)
RBC # BLD: 4.88 M/UL — SIGNIFICANT CHANGE UP (ref 3.8–5.2)
RBC # FLD: 16.3 % — SIGNIFICANT CHANGE UP (ref 10.3–16.9)
SODIUM SERPL-SCNC: 138 MMOL/L — SIGNIFICANT CHANGE UP (ref 135–145)
VANCOMYCIN TROUGH SERPL-MCNC: 12 UG/ML — SIGNIFICANT CHANGE UP (ref 10–20)
WBC # BLD: 9.2 K/UL — SIGNIFICANT CHANGE UP (ref 3.8–10.5)
WBC # FLD AUTO: 9.2 K/UL — SIGNIFICANT CHANGE UP (ref 3.8–10.5)

## 2018-04-15 PROCEDURE — 93010 ELECTROCARDIOGRAM REPORT: CPT

## 2018-04-15 RX ORDER — DIPHENHYDRAMINE HCL 50 MG
50 CAPSULE ORAL EVERY 6 HOURS
Qty: 0 | Refills: 0 | Status: DISCONTINUED | OUTPATIENT
Start: 2018-04-15 | End: 2018-04-18

## 2018-04-15 RX ORDER — HEPARIN SODIUM 5000 [USP'U]/ML
5000 INJECTION INTRAVENOUS; SUBCUTANEOUS EVERY 8 HOURS
Qty: 0 | Refills: 0 | Status: DISCONTINUED | OUTPATIENT
Start: 2018-04-15 | End: 2018-04-22

## 2018-04-15 RX ORDER — DIPHENHYDRAMINE HCL 50 MG
25 CAPSULE ORAL ONCE
Qty: 0 | Refills: 0 | Status: COMPLETED | OUTPATIENT
Start: 2018-04-15 | End: 2018-04-16

## 2018-04-15 RX ORDER — HYDROMORPHONE HYDROCHLORIDE 2 MG/ML
0.5 INJECTION INTRAMUSCULAR; INTRAVENOUS; SUBCUTANEOUS ONCE
Qty: 0 | Refills: 0 | Status: DISCONTINUED | OUTPATIENT
Start: 2018-04-15 | End: 2018-04-15

## 2018-04-15 RX ADMIN — OXYCODONE AND ACETAMINOPHEN 2 TABLET(S): 5; 325 TABLET ORAL at 20:50

## 2018-04-15 RX ADMIN — OXYCODONE AND ACETAMINOPHEN 2 TABLET(S): 5; 325 TABLET ORAL at 19:50

## 2018-04-15 RX ADMIN — Medication 100 MILLIGRAM(S): at 21:46

## 2018-04-15 RX ADMIN — HYDROMORPHONE HYDROCHLORIDE 0.5 MILLIGRAM(S): 2 INJECTION INTRAMUSCULAR; INTRAVENOUS; SUBCUTANEOUS at 21:56

## 2018-04-15 RX ADMIN — OXYCODONE AND ACETAMINOPHEN 1 TABLET(S): 5; 325 TABLET ORAL at 08:27

## 2018-04-15 RX ADMIN — Medication 50 MILLIGRAM(S): at 23:17

## 2018-04-15 RX ADMIN — Medication 2: at 07:54

## 2018-04-15 RX ADMIN — OXYCODONE AND ACETAMINOPHEN 2 TABLET(S): 5; 325 TABLET ORAL at 00:33

## 2018-04-15 RX ADMIN — Medication 50 MILLIGRAM(S): at 08:30

## 2018-04-15 RX ADMIN — OXYCODONE AND ACETAMINOPHEN 2 TABLET(S): 5; 325 TABLET ORAL at 13:36

## 2018-04-15 RX ADMIN — OXYCODONE AND ACETAMINOPHEN 1 TABLET(S): 5; 325 TABLET ORAL at 15:43

## 2018-04-15 RX ADMIN — HYDROMORPHONE HYDROCHLORIDE 0.5 MILLIGRAM(S): 2 INJECTION INTRAMUSCULAR; INTRAVENOUS; SUBCUTANEOUS at 21:46

## 2018-04-15 RX ADMIN — OXYCODONE AND ACETAMINOPHEN 2 TABLET(S): 5; 325 TABLET ORAL at 07:42

## 2018-04-15 RX ADMIN — Medication 250 MILLIGRAM(S): at 19:35

## 2018-04-15 RX ADMIN — OXYCODONE AND ACETAMINOPHEN 2 TABLET(S): 5; 325 TABLET ORAL at 01:33

## 2018-04-15 RX ADMIN — Medication 250 MILLIGRAM(S): at 05:43

## 2018-04-15 RX ADMIN — OXYCODONE AND ACETAMINOPHEN 1 TABLET(S): 5; 325 TABLET ORAL at 16:08

## 2018-04-15 RX ADMIN — HEPARIN SODIUM 5000 UNIT(S): 5000 INJECTION INTRAVENOUS; SUBCUTANEOUS at 21:46

## 2018-04-15 RX ADMIN — Medication 100 MILLIGRAM(S): at 13:35

## 2018-04-15 RX ADMIN — HEPARIN SODIUM 5000 UNIT(S): 5000 INJECTION INTRAVENOUS; SUBCUTANEOUS at 14:17

## 2018-04-15 RX ADMIN — OXYCODONE AND ACETAMINOPHEN 2 TABLET(S): 5; 325 TABLET ORAL at 14:09

## 2018-04-15 RX ADMIN — Medication 100 MILLIGRAM(S): at 04:28

## 2018-04-15 RX ADMIN — OXYCODONE AND ACETAMINOPHEN 2 TABLET(S): 5; 325 TABLET ORAL at 06:42

## 2018-04-15 RX ADMIN — OXYCODONE AND ACETAMINOPHEN 1 TABLET(S): 5; 325 TABLET ORAL at 08:52

## 2018-04-15 NOTE — PROGRESS NOTE ADULT - SUBJECTIVE AND OBJECTIVE BOX
ON FAUSTINO  4/14: Aztreonam approved, FAUSTINO      37 yo F h/o HTN, DM, HLD, MO, prior ventral hernia repair with wound infection    -admit to regional  -vanco/aztreonam  -regular diet  -ISS  -pain/nausea control  -HSQ/SCDs  -d/w chief on call and Dr Cheng ON FAUSTINO  4/14: Aztreonam approved, FAUSTINO     SUBJECTIVE: Patient seen and examined bedside by chief resident. Complaining of tight contractions in belly.     aztreonam  IVPB 2000 milliGRAM(s) IV Intermittent every 8 hours  heparin  Injectable 5000 Unit(s) SubCutaneous every 8 hours  vancomycin  IVPB 1500 milliGRAM(s) IV Intermittent every 12 hours      Vital Signs Last 24 Hrs  T(C): 37.1 (15 Apr 2018 08:15), Max: 37.2 (14 Apr 2018 13:30)  T(F): 98.7 (15 Apr 2018 08:15), Max: 98.9 (14 Apr 2018 13:30)  HR: 78 (15 Apr 2018 08:15) (72 - 80)  BP: 153/77 (15 Apr 2018 08:15) (126/81 - 154/91)  BP(mean): --  RR: 17 (15 Apr 2018 08:15) (16 - 17)  SpO2: 97% (15 Apr 2018 08:15) (97% - 100%)  I&O's Detail    14 Apr 2018 07:01  -  15 Apr 2018 07:00  --------------------------------------------------------  IN:    Oral Fluid: 600 mL    Solution: 50 mL    Solution: 500 mL  Total IN: 1150 mL    OUT:    Voided: 250 mL  Total OUT: 250 mL    Total NET: 900 mL      General: NAD, resting comfortably in bed  C/V: NSR  Pulm: Nonlabored breathing, no respiratory distress  Abd: soft, Mildly tender to palpation. Drainage along lower abdominal incision.   Extrem: WWP, no edema, SCDs in place        LABS:                        12.2   9.2   )-----------( 382      ( 15 Apr 2018 05:57 )             39.1     04-15    138  |  101  |  8   ----------------------------<  188<H>  4.2   |  26  |  0.80    Ca    8.9      15 Apr 2018 05:55  Phos  4.1     04-15  Mg     2.0     04-15    TPro  7.7  /  Alb  3.8  /  TBili  0.2  /  DBili  x   /  AST  14  /  ALT  6<L>  /  AlkPhos  101  04-14    PT/INR - ( 14 Apr 2018 00:51 )   PT: 12.1 sec;   INR: 1.09          PTT - ( 14 Apr 2018 00:51 )  PTT:31.7 sec      RADIOLOGY & ADDITIONAL STUDIES:

## 2018-04-15 NOTE — PROGRESS NOTE ADULT - ASSESSMENT
37 yo F h/o HTN, DM, HLD, MO, prior ventral hernia repair with wound infection    -admit to regional  -vanco/aztreonam  -regular diet  -ISS  -pain/nausea control  -HSQ/SCDs  -d/w chief on call and Dr Hurtado

## 2018-04-16 LAB
ANION GAP SERPL CALC-SCNC: 15 MMOL/L — SIGNIFICANT CHANGE UP (ref 5–17)
BUN SERPL-MCNC: 10 MG/DL — SIGNIFICANT CHANGE UP (ref 7–23)
CALCIUM SERPL-MCNC: 8.9 MG/DL — SIGNIFICANT CHANGE UP (ref 8.4–10.5)
CHLORIDE SERPL-SCNC: 97 MMOL/L — SIGNIFICANT CHANGE UP (ref 96–108)
CO2 SERPL-SCNC: 24 MMOL/L — SIGNIFICANT CHANGE UP (ref 22–31)
CREAT SERPL-MCNC: 0.8 MG/DL — SIGNIFICANT CHANGE UP (ref 0.5–1.3)
GLUCOSE BLDC GLUCOMTR-MCNC: 113 MG/DL — HIGH (ref 70–99)
GLUCOSE BLDC GLUCOMTR-MCNC: 128 MG/DL — HIGH (ref 70–99)
GLUCOSE BLDC GLUCOMTR-MCNC: 145 MG/DL — HIGH (ref 70–99)
GLUCOSE BLDC GLUCOMTR-MCNC: 163 MG/DL — HIGH (ref 70–99)
GLUCOSE SERPL-MCNC: 149 MG/DL — HIGH (ref 70–99)
HCT VFR BLD CALC: 41.8 % — SIGNIFICANT CHANGE UP (ref 34.5–45)
HGB BLD-MCNC: 13 G/DL — SIGNIFICANT CHANGE UP (ref 11.5–15.5)
MAGNESIUM SERPL-MCNC: 2.2 MG/DL — SIGNIFICANT CHANGE UP (ref 1.6–2.6)
MCHC RBC-ENTMCNC: 24.8 PG — LOW (ref 27–34)
MCHC RBC-ENTMCNC: 31.1 G/DL — LOW (ref 32–36)
MCV RBC AUTO: 79.6 FL — LOW (ref 80–100)
PHOSPHATE SERPL-MCNC: 3.7 MG/DL — SIGNIFICANT CHANGE UP (ref 2.5–4.5)
PLATELET # BLD AUTO: 443 K/UL — HIGH (ref 150–400)
POTASSIUM SERPL-MCNC: 4.2 MMOL/L — SIGNIFICANT CHANGE UP (ref 3.5–5.3)
POTASSIUM SERPL-SCNC: 4.2 MMOL/L — SIGNIFICANT CHANGE UP (ref 3.5–5.3)
RBC # BLD: 5.25 M/UL — HIGH (ref 3.8–5.2)
RBC # FLD: 16.3 % — SIGNIFICANT CHANGE UP (ref 10.3–16.9)
SODIUM SERPL-SCNC: 136 MMOL/L — SIGNIFICANT CHANGE UP (ref 135–145)
WBC # BLD: 8.3 K/UL — SIGNIFICANT CHANGE UP (ref 3.8–10.5)
WBC # FLD AUTO: 8.3 K/UL — SIGNIFICANT CHANGE UP (ref 3.8–10.5)

## 2018-04-16 PROCEDURE — 99223 1ST HOSP IP/OBS HIGH 75: CPT

## 2018-04-16 RX ORDER — HYDROMORPHONE HYDROCHLORIDE 2 MG/ML
0.5 INJECTION INTRAMUSCULAR; INTRAVENOUS; SUBCUTANEOUS ONCE
Qty: 0 | Refills: 0 | Status: DISCONTINUED | OUTPATIENT
Start: 2018-04-16 | End: 2018-04-16

## 2018-04-16 RX ORDER — DIPHENHYDRAMINE HCL 50 MG
25 CAPSULE ORAL ONCE
Qty: 0 | Refills: 0 | Status: COMPLETED | OUTPATIENT
Start: 2018-04-16 | End: 2018-04-16

## 2018-04-16 RX ORDER — HYDROMORPHONE HYDROCHLORIDE 2 MG/ML
0.5 INJECTION INTRAMUSCULAR; INTRAVENOUS; SUBCUTANEOUS EVERY 4 HOURS
Qty: 0 | Refills: 0 | Status: DISCONTINUED | OUTPATIENT
Start: 2018-04-16 | End: 2018-04-18

## 2018-04-16 RX ORDER — METRONIDAZOLE 500 MG
500 TABLET ORAL EVERY 8 HOURS
Qty: 0 | Refills: 0 | Status: DISCONTINUED | OUTPATIENT
Start: 2018-04-16 | End: 2018-04-20

## 2018-04-16 RX ORDER — HYDROMORPHONE HYDROCHLORIDE 2 MG/ML
1 INJECTION INTRAMUSCULAR; INTRAVENOUS; SUBCUTANEOUS EVERY 4 HOURS
Qty: 0 | Refills: 0 | Status: DISCONTINUED | OUTPATIENT
Start: 2018-04-16 | End: 2018-04-18

## 2018-04-16 RX ORDER — OXYCODONE AND ACETAMINOPHEN 5; 325 MG/1; MG/1
2 TABLET ORAL EVERY 4 HOURS
Qty: 0 | Refills: 0 | Status: DISCONTINUED | OUTPATIENT
Start: 2018-04-16 | End: 2018-04-16

## 2018-04-16 RX ORDER — OXYCODONE HYDROCHLORIDE 5 MG/1
5 TABLET ORAL ONCE
Qty: 0 | Refills: 0 | Status: DISCONTINUED | OUTPATIENT
Start: 2018-04-16 | End: 2018-04-16

## 2018-04-16 RX ADMIN — OXYCODONE AND ACETAMINOPHEN 2 TABLET(S): 5; 325 TABLET ORAL at 16:22

## 2018-04-16 RX ADMIN — HEPARIN SODIUM 5000 UNIT(S): 5000 INJECTION INTRAVENOUS; SUBCUTANEOUS at 14:01

## 2018-04-16 RX ADMIN — OXYCODONE AND ACETAMINOPHEN 2 TABLET(S): 5; 325 TABLET ORAL at 03:05

## 2018-04-16 RX ADMIN — Medication 100 MILLIGRAM(S): at 21:27

## 2018-04-16 RX ADMIN — HYDROMORPHONE HYDROCHLORIDE 0.5 MILLIGRAM(S): 2 INJECTION INTRAMUSCULAR; INTRAVENOUS; SUBCUTANEOUS at 06:40

## 2018-04-16 RX ADMIN — Medication 250 MILLIGRAM(S): at 06:06

## 2018-04-16 RX ADMIN — OXYCODONE AND ACETAMINOPHEN 2 TABLET(S): 5; 325 TABLET ORAL at 17:20

## 2018-04-16 RX ADMIN — HYDROMORPHONE HYDROCHLORIDE 0.5 MILLIGRAM(S): 2 INJECTION INTRAMUSCULAR; INTRAVENOUS; SUBCUTANEOUS at 06:30

## 2018-04-16 RX ADMIN — OXYCODONE AND ACETAMINOPHEN 2 TABLET(S): 5; 325 TABLET ORAL at 11:01

## 2018-04-16 RX ADMIN — Medication 50 MILLIGRAM(S): at 23:31

## 2018-04-16 RX ADMIN — Medication 250 MILLIGRAM(S): at 16:38

## 2018-04-16 RX ADMIN — OXYCODONE AND ACETAMINOPHEN 2 TABLET(S): 5; 325 TABLET ORAL at 04:05

## 2018-04-16 RX ADMIN — OXYCODONE AND ACETAMINOPHEN 2 TABLET(S): 5; 325 TABLET ORAL at 12:00

## 2018-04-16 RX ADMIN — HYDROMORPHONE HYDROCHLORIDE 1 MILLIGRAM(S): 2 INJECTION INTRAMUSCULAR; INTRAVENOUS; SUBCUTANEOUS at 21:26

## 2018-04-16 RX ADMIN — Medication 2: at 07:29

## 2018-04-16 RX ADMIN — Medication 100 MILLIGRAM(S): at 23:31

## 2018-04-16 RX ADMIN — Medication 100 MILLIGRAM(S): at 15:38

## 2018-04-16 RX ADMIN — Medication 25 MILLIGRAM(S): at 15:35

## 2018-04-16 RX ADMIN — HYDROMORPHONE HYDROCHLORIDE 1 MILLIGRAM(S): 2 INJECTION INTRAMUSCULAR; INTRAVENOUS; SUBCUTANEOUS at 22:00

## 2018-04-16 RX ADMIN — Medication 100 MILLIGRAM(S): at 05:01

## 2018-04-16 RX ADMIN — HEPARIN SODIUM 5000 UNIT(S): 5000 INJECTION INTRAVENOUS; SUBCUTANEOUS at 05:01

## 2018-04-16 RX ADMIN — HEPARIN SODIUM 5000 UNIT(S): 5000 INJECTION INTRAVENOUS; SUBCUTANEOUS at 21:27

## 2018-04-16 RX ADMIN — Medication 25 MILLIGRAM(S): at 05:01

## 2018-04-16 NOTE — CONSULT NOTE ADULT - SUBJECTIVE AND OBJECTIVE BOX
HPI:  39 yo F with PMH of HTN, DM, HLD, MO, prior ventral hernia repair 2017, complicated by wound infection requiring IR drainage 2017, recent abdominal fistulectomy of chronic fistula, explant of infected mesh, irrigation, repair with biologic mesh and placement of wound VAC in March. Pt reports increased purulent drainage from wound and foul odor. Visiting nurse removed vac and told pt to go to ED due to increased purulent drainage and pain at previous incision site. Normal BMs, able to tolerate PO. No fevers or chills. (2018 04:31)      PAST MEDICAL & SURGICAL HISTORY:  Abdominal hernia  Obesity  Diabetes  Hyperlipidemia  Essential hypertension  H/O ventral hernia repair  H/O:   History of D&C  H/O LEEP        REVIEW OF SYSTEMS:    General:	 no weakness; no fevers, no chills  Skin/Breast: no rash  Respiratory and Thorax: no SOB, no cough  Cardiovascular:	No chest pain  Gastrointestinal:	 no nausea, vomiting , diarrhea  Genitourinary:	no dysuria, no difficulty urinating, no hematuria  Musculoskeletal:	no weakness, no joint swelling/pain  Neurological:	no focal weakness/numbness  Endocrine:	no polyuria, no polydipsia      ANTIBIOTICS:  MEDICATIONS  (STANDING):  aztreonam  IVPB 2000 milliGRAM(s) IV Intermittent every 8 hours  dextrose 5%. 1000 milliLiter(s) (50 mL/Hr) IV Continuous <Continuous>  dextrose 50% Injectable 12.5 Gram(s) IV Push once  dextrose 50% Injectable 25 Gram(s) IV Push once  dextrose 50% Injectable 25 Gram(s) IV Push once  heparin  Injectable 5000 Unit(s) SubCutaneous every 8 hours  insulin lispro (HumaLOG) corrective regimen sliding scale   SubCutaneous Before meals and at bedtime  LORazepam   Injectable 1 milliGRAM(s) IntraMuscular once  metroNIDAZOLE  IVPB 500 milliGRAM(s) IV Intermittent every 8 hours  vancomycin  IVPB 1500 milliGRAM(s) IV Intermittent every 12 hours    MEDICATIONS  (PRN):  dextrose Gel 1 Dose(s) Oral once PRN Blood Glucose LESS THAN 70 milliGRAM(s)/deciliter  diphenhydrAMINE   Capsule 50 milliGRAM(s) Oral every 6 hours PRN Rash and/or Itching  glucagon  Injectable 1 milliGRAM(s) IntraMuscular once PRN Glucose LESS THAN 70 milligrams/deciliter  HYDROmorphone  Injectable 0.5 milliGRAM(s) IV Push every 4 hours PRN Moderate Pain (4 - 6)  HYDROmorphone  Injectable 1 milliGRAM(s) IV Push every 4 hours PRN Severe Pain (7 - 10)      Allergies    amoxicillin (Unknown)  iodine containing compounds (Hives; Anaphylaxis)  penicillin (Hives)  shellfish. (Hives; Anaphylaxis)    Intolerances        SOCIAL HISTORY:    FAMILY HISTORY:  No pertinent family history in first degree relatives      Vital Signs Last 24 Hrs  T(C): 36.9 (2018 16:35), Max: 36.9 (2018 16:35)  T(F): 98.5 (2018 16:35), Max: 98.5 (2018 16:35)  HR: 75 (2018 16:35) (66 - 75)  BP: 163/95 (2018 16:35) (138/89 - 163/95)  BP(mean): --  RR: 17 (2018 16:35) (16 - 17)  SpO2: 99% (2018 16:35) (98% - 100%)    PHYSICAL EXAM:  Constitutional:Well-developed, well nourished  Eyes:NAOMI, EOMI  Ear/Nose/Throat: no oral lesion, no sinus tenderness on percussion	  Neck:no JVD, no lymphadenopathy, supple  Respiratory: CTA cristi  Cardiovascular: S1S2 RRR, no murmurs  Gastrointestinal:soft, (+) BS, no HSM, lower abdominal transverse incision without surrounding erythema; some green drainage noted on dressing  Extremities:no e/e/c  Vascular: DP Pulse:	right normal; left normal            LABS:                        13.0   8.3   )-----------( 443      ( 2018 07:01 )             41.8     -16    136  |  97  |  10  ----------------------------<  149<H>  4.2   |  24  |  0.80    Ca    8.9      2018 07:01  Phos  3.7     04-16  Mg     2.2     -            MICROBIOLOGY: reviewed  RADIOLOGY & ADDITIONAL STUDIES: reviewed

## 2018-04-16 NOTE — CONSULT NOTE ADULT - ASSESSMENT
37 yo obese female with DM, ventral hernia repair 1/2017 c/b polymicrobial wound infection/abdominal wall collection associated with hernia mesh (cx with E. faecalis, Actinomyces, Bacteroides, Prevotella, Peptostreptococcus, and Bacillus) s/p course of ertapenem (unclear how long she was actually treated). She subsequently underwent fistulectomy, drainage of abscess, removal of infected mesh and placement of biologic mesh on 3/14 (OR cx negative). Now she again presents with pelvic pain and malodorous drainage from abdominal wound. Additionally, she has a nebulous amoxicillin allergy and had ?hives with PCN during childhood. 1. to f/u surgical plan given large abdominal wall abscess--please send cultures if goes to OR. 2. Patient would benefit from PCN allergy skin testing--please perform. 3. For now, continue vancomycin + aztreonam and add metronidazole 500mg IV q8h for anaerobic coverage; if PCN skin testing negative, anticipate transitioning to piperacillin-tazobactam.

## 2018-04-16 NOTE — PROVIDER CONTACT NOTE (OTHER) - RECOMMENDATIONS
LUZ MARIA Motta was made aware of patient's status and behavior. unit nursing leadership was made aware of patient's status and behavior.

## 2018-04-16 NOTE — PROGRESS NOTE ADULT - SUBJECTIVE AND OBJECTIVE BOX
O/N: vanco trough 12 current dose continued. VSS. FAUSTINO  4/15: C/o CP: EKG NSR. given 1mg Ativan, all s/sx resolved.        INTERVAL HPI/OVERNIGHT EVENTS:      SUBJECTIVE: Pt seen and examined at bedside. no new complaints  Flatus: [x ] YES [ ] NO             Bowel Movement: [ x] YES [ ] NO  Pain (0-10):            Pain Control Adequate: [ ] YES [x ] NO  Nausea: [ ] YES [ x] NO            Vomiting: [ ] YES [ x] NO  Diarrhea: [ ] YES [ x] NO         Constipation: [ ] YES [x ] NO     Chest Pain: [ ] YES [ x] NO    SOB:  [ ] YES [ x] NO    MEDICATIONS  (STANDING):  aztreonam  IVPB 2000 milliGRAM(s) IV Intermittent every 8 hours  dextrose 5%. 1000 milliLiter(s) (50 mL/Hr) IV Continuous <Continuous>  dextrose 50% Injectable 12.5 Gram(s) IV Push once  dextrose 50% Injectable 25 Gram(s) IV Push once  dextrose 50% Injectable 25 Gram(s) IV Push once  heparin  Injectable 5000 Unit(s) SubCutaneous every 8 hours  insulin lispro (HumaLOG) corrective regimen sliding scale   SubCutaneous Before meals and at bedtime  LORazepam   Injectable 1 milliGRAM(s) IntraMuscular once  oxyCODONE    IR 5 milliGRAM(s) Oral once  vancomycin  IVPB 1500 milliGRAM(s) IV Intermittent every 12 hours    MEDICATIONS  (PRN):  dextrose Gel 1 Dose(s) Oral once PRN Blood Glucose LESS THAN 70 milliGRAM(s)/deciliter  diphenhydrAMINE   Capsule 50 milliGRAM(s) Oral every 6 hours PRN Rash and/or Itching  glucagon  Injectable 1 milliGRAM(s) IntraMuscular once PRN Glucose LESS THAN 70 milligrams/deciliter  oxyCODONE    5 mG/acetaminophen 325 mG 1 Tablet(s) Oral every 4 hours PRN Moderate Pain  oxyCODONE    5 mG/acetaminophen 325 mG 2 Tablet(s) Oral every 6 hours PRN Severe Pain      Vital Signs Last 24 Hrs  T(C): 36.7 (16 Apr 2018 06:22), Max: 36.7 (15 Apr 2018 14:19)  T(F): 98.1 (16 Apr 2018 06:22), Max: 98.1 (15 Apr 2018 14:19)  HR: 68 (16 Apr 2018 06:22) (68 - 74)  BP: 153/87 (16 Apr 2018 06:22) (138/80 - 153/87)  BP(mean): --  RR: 17 (16 Apr 2018 06:22) (17 - 17)  SpO2: 100% (16 Apr 2018 06:22) (99% - 100%)    PHYSICAL EXAM:      Constitutional: A&Ox3    Breasts:    Respiratory: non labored breathing, no respiratory distress    Cardiovascular: RRR    Gastrointestinal: soft nt nd                 Incision: Pfannenstiel incision open with green drainage.      Genitourinary:voiding    Extremities: (-) edema                  I&O's Detail    15 Apr 2018 07:01  -  16 Apr 2018 07:00  --------------------------------------------------------  IN:    Oral Fluid: 400 mL    Solution: 100 mL    Solution: 500 mL  Total IN: 1000 mL    OUT:    Voided: 1250 mL  Total OUT: 1250 mL    Total NET: -250 mL          LABS:                        13.0   8.3   )-----------( 443      ( 16 Apr 2018 07:01 )             41.8     04-16    136  |  97  |  10  ----------------------------<  149<H>  4.2   |  24  |  0.80    Ca    8.9      16 Apr 2018 07:01  Phos  3.7     04-16  Mg     2.2     04-16            RADIOLOGY & ADDITIONAL STUDIES:

## 2018-04-16 NOTE — PROGRESS NOTE ADULT - ASSESSMENT
37 yo F h/o HTN, DM, HLD, MO, prior ventral hernia repair with wound infection    -vanco/aztreonam  -regular diet / npo today until decision made about procedure  -ISS  -pain/nausea control  -HSQ/SCDs

## 2018-04-17 LAB
ANION GAP SERPL CALC-SCNC: 11 MMOL/L — SIGNIFICANT CHANGE UP (ref 5–17)
APPEARANCE UR: CLEAR — SIGNIFICANT CHANGE UP
APTT BLD: 31.8 SEC — SIGNIFICANT CHANGE UP (ref 27.5–37.4)
BILIRUB UR-MCNC: NEGATIVE — SIGNIFICANT CHANGE UP
BLD GP AB SCN SERPL QL: NEGATIVE — SIGNIFICANT CHANGE UP
BUN SERPL-MCNC: 8 MG/DL — SIGNIFICANT CHANGE UP (ref 7–23)
CALCIUM SERPL-MCNC: 9.1 MG/DL — SIGNIFICANT CHANGE UP (ref 8.4–10.5)
CHLORIDE SERPL-SCNC: 99 MMOL/L — SIGNIFICANT CHANGE UP (ref 96–108)
CO2 SERPL-SCNC: 26 MMOL/L — SIGNIFICANT CHANGE UP (ref 22–31)
COLOR SPEC: YELLOW — SIGNIFICANT CHANGE UP
CREAT SERPL-MCNC: 0.73 MG/DL — SIGNIFICANT CHANGE UP (ref 0.5–1.3)
DIFF PNL FLD: NEGATIVE — SIGNIFICANT CHANGE UP
GLUCOSE BLDC GLUCOMTR-MCNC: 120 MG/DL — HIGH (ref 70–99)
GLUCOSE BLDC GLUCOMTR-MCNC: 123 MG/DL — HIGH (ref 70–99)
GLUCOSE BLDC GLUCOMTR-MCNC: 124 MG/DL — HIGH (ref 70–99)
GLUCOSE SERPL-MCNC: 155 MG/DL — HIGH (ref 70–99)
GLUCOSE UR QL: NEGATIVE — SIGNIFICANT CHANGE UP
GRAM STN FLD: SIGNIFICANT CHANGE UP
GRAM STN FLD: SIGNIFICANT CHANGE UP
HCG UR QL: NEGATIVE — SIGNIFICANT CHANGE UP
HCT VFR BLD CALC: 39.3 % — SIGNIFICANT CHANGE UP (ref 34.5–45)
HGB BLD-MCNC: 12.6 G/DL — SIGNIFICANT CHANGE UP (ref 11.5–15.5)
INR BLD: 1.13 — SIGNIFICANT CHANGE UP (ref 0.88–1.16)
KETONES UR-MCNC: NEGATIVE — SIGNIFICANT CHANGE UP
LEUKOCYTE ESTERASE UR-ACNC: NEGATIVE — SIGNIFICANT CHANGE UP
MAGNESIUM SERPL-MCNC: 2.2 MG/DL — SIGNIFICANT CHANGE UP (ref 1.6–2.6)
MCHC RBC-ENTMCNC: 25.5 PG — LOW (ref 27–34)
MCHC RBC-ENTMCNC: 32.1 G/DL — SIGNIFICANT CHANGE UP (ref 32–36)
MCV RBC AUTO: 79.4 FL — LOW (ref 80–100)
NITRITE UR-MCNC: NEGATIVE — SIGNIFICANT CHANGE UP
PH UR: 5.5 — SIGNIFICANT CHANGE UP (ref 5–8)
PHOSPHATE SERPL-MCNC: 3.3 MG/DL — SIGNIFICANT CHANGE UP (ref 2.5–4.5)
PLATELET # BLD AUTO: 369 K/UL — SIGNIFICANT CHANGE UP (ref 150–400)
POTASSIUM SERPL-MCNC: 4 MMOL/L — SIGNIFICANT CHANGE UP (ref 3.5–5.3)
POTASSIUM SERPL-SCNC: 4 MMOL/L — SIGNIFICANT CHANGE UP (ref 3.5–5.3)
PROT UR-MCNC: NEGATIVE MG/DL — SIGNIFICANT CHANGE UP
PROTHROM AB SERPL-ACNC: 12.6 SEC — SIGNIFICANT CHANGE UP (ref 9.8–12.7)
RBC # BLD: 4.95 M/UL — SIGNIFICANT CHANGE UP (ref 3.8–5.2)
RBC # FLD: 16 % — SIGNIFICANT CHANGE UP (ref 10.3–16.9)
RH IG SCN BLD-IMP: NEGATIVE — SIGNIFICANT CHANGE UP
SODIUM SERPL-SCNC: 136 MMOL/L — SIGNIFICANT CHANGE UP (ref 135–145)
SP GR SPEC: 1.02 — SIGNIFICANT CHANGE UP (ref 1–1.03)
SPECIMEN SOURCE: SIGNIFICANT CHANGE UP
SPECIMEN SOURCE: SIGNIFICANT CHANGE UP
UROBILINOGEN FLD QL: 0.2 E.U./DL — SIGNIFICANT CHANGE UP
VANCOMYCIN TROUGH SERPL-MCNC: 15 UG/ML — SIGNIFICANT CHANGE UP (ref 10–20)
WBC # BLD: 8.7 K/UL — SIGNIFICANT CHANGE UP (ref 3.8–10.5)
WBC # FLD AUTO: 8.7 K/UL — SIGNIFICANT CHANGE UP (ref 3.8–10.5)

## 2018-04-17 RX ORDER — DIPHENHYDRAMINE HCL 50 MG
50 CAPSULE ORAL ONCE
Qty: 0 | Refills: 0 | Status: COMPLETED | OUTPATIENT
Start: 2018-04-17 | End: 2018-04-17

## 2018-04-17 RX ADMIN — HYDROMORPHONE HYDROCHLORIDE 1 MILLIGRAM(S): 2 INJECTION INTRAMUSCULAR; INTRAVENOUS; SUBCUTANEOUS at 05:45

## 2018-04-17 RX ADMIN — Medication 250 MILLIGRAM(S): at 04:30

## 2018-04-17 RX ADMIN — Medication 50 MILLIGRAM(S): at 21:36

## 2018-04-17 RX ADMIN — Medication 100 MILLIGRAM(S): at 22:00

## 2018-04-17 RX ADMIN — HYDROMORPHONE HYDROCHLORIDE 1 MILLIGRAM(S): 2 INJECTION INTRAMUSCULAR; INTRAVENOUS; SUBCUTANEOUS at 10:30

## 2018-04-17 RX ADMIN — Medication 50 MILLIGRAM(S): at 16:10

## 2018-04-17 RX ADMIN — Medication 250 MILLIGRAM(S): at 16:20

## 2018-04-17 RX ADMIN — HYDROMORPHONE HYDROCHLORIDE 1 MILLIGRAM(S): 2 INJECTION INTRAMUSCULAR; INTRAVENOUS; SUBCUTANEOUS at 23:39

## 2018-04-17 RX ADMIN — HEPARIN SODIUM 5000 UNIT(S): 5000 INJECTION INTRAVENOUS; SUBCUTANEOUS at 22:00

## 2018-04-17 RX ADMIN — HYDROMORPHONE HYDROCHLORIDE 1 MILLIGRAM(S): 2 INJECTION INTRAMUSCULAR; INTRAVENOUS; SUBCUTANEOUS at 20:30

## 2018-04-17 RX ADMIN — Medication 100 MILLIGRAM(S): at 21:36

## 2018-04-17 RX ADMIN — Medication 50 MILLIGRAM(S): at 05:17

## 2018-04-17 RX ADMIN — HYDROMORPHONE HYDROCHLORIDE 1 MILLIGRAM(S): 2 INJECTION INTRAMUSCULAR; INTRAVENOUS; SUBCUTANEOUS at 23:24

## 2018-04-17 RX ADMIN — HYDROMORPHONE HYDROCHLORIDE 1 MILLIGRAM(S): 2 INJECTION INTRAMUSCULAR; INTRAVENOUS; SUBCUTANEOUS at 10:15

## 2018-04-17 RX ADMIN — Medication 100 MILLIGRAM(S): at 06:54

## 2018-04-17 RX ADMIN — HEPARIN SODIUM 5000 UNIT(S): 5000 INJECTION INTRAVENOUS; SUBCUTANEOUS at 05:30

## 2018-04-17 RX ADMIN — HYDROMORPHONE HYDROCHLORIDE 1 MILLIGRAM(S): 2 INJECTION INTRAMUSCULAR; INTRAVENOUS; SUBCUTANEOUS at 05:16

## 2018-04-17 RX ADMIN — Medication 100 MILLIGRAM(S): at 05:29

## 2018-04-17 RX ADMIN — HYDROMORPHONE HYDROCHLORIDE 1 MILLIGRAM(S): 2 INJECTION INTRAMUSCULAR; INTRAVENOUS; SUBCUTANEOUS at 19:23

## 2018-04-17 NOTE — PROGRESS NOTE ADULT - SUBJECTIVE AND OBJECTIVE BOX
O/N: regular diet, NPO at midnight, add on tomorrow for drainage, vanco trough 15 dose continued. VSS. FAUSTINO  4/16: Flagyl added per ID consult Pending OR OVERNIGHT EVENTS:  regular diet, NPO at midnight, add on tomorrow for drainage, vanco trough 15 dose continued. VSS. FAUSTINO  4/16: Flagyl added per ID consult Pending OR      SUBJECTIVE: Patient complains of incisional pain.  Flatus: [] YES [X] NO             Bowel Movement: [ ] YES [X ] NO  Pain (0-10):            Pain Control Adequate: [X ] YES [ ] NO  Nausea: [ ] YES [ X] NO            Vomiting: [ ] YES [X ] NO  Diarrhea: [ ] YES [X] NO         Constipation: [ ] YES [X ] NO     Chest Pain: [ ] YES [X ] NO    SOB:  [ ] YES [X ] NO    aztreonam  IVPB 2000 milliGRAM(s) IV Intermittent every 8 hours  heparin  Injectable 5000 Unit(s) SubCutaneous every 8 hours  metroNIDAZOLE  IVPB 500 milliGRAM(s) IV Intermittent every 8 hours  vancomycin  IVPB 1500 milliGRAM(s) IV Intermittent every 12 hours      Vital Signs Last 24 Hrs  T(C): 36.3 (17 Apr 2018 05:09), Max: 36.9 (16 Apr 2018 16:35)  T(F): 97.3 (17 Apr 2018 05:09), Max: 98.5 (16 Apr 2018 16:35)  HR: 76 (17 Apr 2018 05:09) (66 - 76)  BP: 149/86 (17 Apr 2018 05:09) (138/89 - 163/95)  BP(mean): --  RR: 17 (17 Apr 2018 05:09) (16 - 17)  SpO2: 100% (17 Apr 2018 05:09) (98% - 100%)  I&O's Detail    16 Apr 2018 07:01  -  17 Apr 2018 07:00  --------------------------------------------------------  IN:    Oral Fluid: 320 mL    Solution: 500 mL    Solution: 100 mL  Total IN: 920 mL    OUT:    Voided: 3 mL  Total OUT: 3 mL    Total NET: 917 mL          General: NAD, resting comfortably in bed  C/V: NSR  Pulm: Nonlabored breathing, no respiratory distress  Abd: soft,  non distended, abdominal incision purulent fluid expressed/  Extrem: WWP, no edema, SCDs in place      LABS:                        12.6   8.7   )-----------( 369      ( 17 Apr 2018 03:16 )             39.3     04-17    136  |  99  |  8   ----------------------------<  155<H>  4.0   |  26  |  0.73    Ca    9.1      17 Apr 2018 03:16  Phos  3.3     04-17  Mg     2.2     04-17      PT/INR - ( 17 Apr 2018 03:14 )   PT: 12.6 sec;   INR: 1.13          PTT - ( 17 Apr 2018 03:14 )  PTT:31.8 sec

## 2018-04-17 NOTE — PROGRESS NOTE ADULT - ASSESSMENT
38yFemale s/p Incision and drainage of abdominal collection   Regular diet   IVF  Pain/nausea control  OOB/AMB SCD'S  F/U am labs   TOV@10 pm  c/w vanco , flagyl and azatacm

## 2018-04-17 NOTE — BRIEF OPERATIVE NOTE - POST-OP DX
Abdominal wall fluid collections  04/17/2018    Active  Keven Grady  Pelvic fluid collection  04/17/2018    Active  Keven Grady

## 2018-04-17 NOTE — BRIEF OPERATIVE NOTE - PROCEDURE
<<-----Click on this checkbox to enter Procedure Incision and drainage  04/17/2018    [facility]

## 2018-04-17 NOTE — BRIEF OPERATIVE NOTE - OPERATION/FINDINGS
low lying transverse incision well healing, wound explored and seroma aspirated, sent for culture. Seroma cavity opned, drained and packed

## 2018-04-17 NOTE — PROGRESS NOTE ADULT - SUBJECTIVE AND OBJECTIVE BOX
POST-OPERATIVE NOTE    Procedure: Incision and drainage of abdominal collection     Diagnosis/Indication: abdominal fluid collection  Surgeon: Dr. Hurtado    S: Pt has no complaints. Denies CP, SOB, FORBES, calf tenderness. Pain controlled with medication.    O:  T(C): 36.2 (04-17-18 @ 14:40), Max: 36.2 (04-17-18 @ 14:40)  T(F): 97.2 (04-17-18 @ 14:40), Max: 97.2 (04-17-18 @ 14:40)  HR: 86 (04-17-18 @ 15:55) (70 - 93)  BP: 135/72 (04-17-18 @ 15:55) (135/72 - 147/68)  RR: 16 (04-17-18 @ 15:55) (16 - 18)  SpO2: 100% (04-17-18 @ 15:55) (90% - 100%)  Wt(kg): --                        12.6   8.7   )-----------( 369      ( 17 Apr 2018 03:16 )             39.3     04-17    136  |  99  |  8   ----------------------------<  155<H>  4.0   |  26  |  0.73    Ca    9.1      17 Apr 2018 03:16  Phos  3.3     04-17  Mg     2.2     04-17        Gen: NAD, resting comfortably in bed  C/V: NSR  Pulm: Nonlabored breathing, no respiratory distress  Abd: soft, NT/ND  Extrem: WWP, no calf edema, SCDs in place      A/P: 38yFemale s/p Incision and drainage of abdominal collection

## 2018-04-18 LAB
ANION GAP SERPL CALC-SCNC: 10 MMOL/L — SIGNIFICANT CHANGE UP (ref 5–17)
BUN SERPL-MCNC: 10 MG/DL — SIGNIFICANT CHANGE UP (ref 7–23)
CALCIUM SERPL-MCNC: 8.8 MG/DL — SIGNIFICANT CHANGE UP (ref 8.4–10.5)
CHLORIDE SERPL-SCNC: 99 MMOL/L — SIGNIFICANT CHANGE UP (ref 96–108)
CO2 SERPL-SCNC: 26 MMOL/L — SIGNIFICANT CHANGE UP (ref 22–31)
CREAT SERPL-MCNC: 0.83 MG/DL — SIGNIFICANT CHANGE UP (ref 0.5–1.3)
GLUCOSE BLDC GLUCOMTR-MCNC: 120 MG/DL — HIGH (ref 70–99)
GLUCOSE BLDC GLUCOMTR-MCNC: 135 MG/DL — HIGH (ref 70–99)
GLUCOSE BLDC GLUCOMTR-MCNC: 137 MG/DL — HIGH (ref 70–99)
GLUCOSE BLDC GLUCOMTR-MCNC: 143 MG/DL — HIGH (ref 70–99)
GLUCOSE SERPL-MCNC: 121 MG/DL — HIGH (ref 70–99)
HCT VFR BLD CALC: 37.9 % — SIGNIFICANT CHANGE UP (ref 34.5–45)
HGB BLD-MCNC: 11.7 G/DL — SIGNIFICANT CHANGE UP (ref 11.5–15.5)
MAGNESIUM SERPL-MCNC: 2.1 MG/DL — SIGNIFICANT CHANGE UP (ref 1.6–2.6)
MCHC RBC-ENTMCNC: 24.8 PG — LOW (ref 27–34)
MCHC RBC-ENTMCNC: 30.9 G/DL — LOW (ref 32–36)
MCV RBC AUTO: 80.5 FL — SIGNIFICANT CHANGE UP (ref 80–100)
NIGHT BLUE STAIN TISS: SIGNIFICANT CHANGE UP
NIGHT BLUE STAIN TISS: SIGNIFICANT CHANGE UP
PHOSPHATE SERPL-MCNC: 3.2 MG/DL — SIGNIFICANT CHANGE UP (ref 2.5–4.5)
PLATELET # BLD AUTO: 333 K/UL — SIGNIFICANT CHANGE UP (ref 150–400)
POTASSIUM SERPL-MCNC: 4 MMOL/L — SIGNIFICANT CHANGE UP (ref 3.5–5.3)
POTASSIUM SERPL-SCNC: 4 MMOL/L — SIGNIFICANT CHANGE UP (ref 3.5–5.3)
RBC # BLD: 4.71 M/UL — SIGNIFICANT CHANGE UP (ref 3.8–5.2)
RBC # FLD: 16.3 % — SIGNIFICANT CHANGE UP (ref 10.3–16.9)
SODIUM SERPL-SCNC: 135 MMOL/L — SIGNIFICANT CHANGE UP (ref 135–145)
SPECIMEN SOURCE: SIGNIFICANT CHANGE UP
SPECIMEN SOURCE: SIGNIFICANT CHANGE UP
VANCOMYCIN TROUGH SERPL-MCNC: 14 UG/ML — SIGNIFICANT CHANGE UP (ref 10–20)
WBC # BLD: 8.9 K/UL — SIGNIFICANT CHANGE UP (ref 3.8–10.5)
WBC # FLD AUTO: 8.9 K/UL — SIGNIFICANT CHANGE UP (ref 3.8–10.5)

## 2018-04-18 PROCEDURE — 99232 SBSQ HOSP IP/OBS MODERATE 35: CPT

## 2018-04-18 RX ORDER — DIPHENHYDRAMINE HCL 50 MG
25 CAPSULE ORAL ONCE
Qty: 0 | Refills: 0 | Status: COMPLETED | OUTPATIENT
Start: 2018-04-18 | End: 2018-04-18

## 2018-04-18 RX ORDER — HYDROMORPHONE HYDROCHLORIDE 2 MG/ML
1 INJECTION INTRAMUSCULAR; INTRAVENOUS; SUBCUTANEOUS ONCE
Qty: 0 | Refills: 0 | Status: DISCONTINUED | OUTPATIENT
Start: 2018-04-18 | End: 2018-04-18

## 2018-04-18 RX ORDER — OXYCODONE AND ACETAMINOPHEN 5; 325 MG/1; MG/1
1 TABLET ORAL EVERY 4 HOURS
Qty: 0 | Refills: 0 | Status: DISCONTINUED | OUTPATIENT
Start: 2018-04-18 | End: 2018-04-18

## 2018-04-18 RX ORDER — DIPHENHYDRAMINE HCL 50 MG
50 CAPSULE ORAL EVERY 6 HOURS
Qty: 0 | Refills: 0 | Status: DISCONTINUED | OUTPATIENT
Start: 2018-04-18 | End: 2018-04-20

## 2018-04-18 RX ORDER — ONDANSETRON 8 MG/1
4 TABLET, FILM COATED ORAL EVERY 6 HOURS
Qty: 0 | Refills: 0 | Status: DISCONTINUED | OUTPATIENT
Start: 2018-04-18 | End: 2018-04-22

## 2018-04-18 RX ORDER — HYDROMORPHONE HYDROCHLORIDE 2 MG/ML
0.5 INJECTION INTRAMUSCULAR; INTRAVENOUS; SUBCUTANEOUS ONCE
Qty: 0 | Refills: 0 | Status: DISCONTINUED | OUTPATIENT
Start: 2018-04-18 | End: 2018-04-18

## 2018-04-18 RX ORDER — HYDROMORPHONE HYDROCHLORIDE 2 MG/ML
1 INJECTION INTRAMUSCULAR; INTRAVENOUS; SUBCUTANEOUS EVERY 4 HOURS
Qty: 0 | Refills: 0 | Status: DISCONTINUED | OUTPATIENT
Start: 2018-04-18 | End: 2018-04-20

## 2018-04-18 RX ORDER — HYDROMORPHONE HYDROCHLORIDE 2 MG/ML
0.5 INJECTION INTRAMUSCULAR; INTRAVENOUS; SUBCUTANEOUS EVERY 4 HOURS
Qty: 0 | Refills: 0 | Status: DISCONTINUED | OUTPATIENT
Start: 2018-04-18 | End: 2018-04-20

## 2018-04-18 RX ORDER — OXYCODONE AND ACETAMINOPHEN 5; 325 MG/1; MG/1
2 TABLET ORAL EVERY 4 HOURS
Qty: 0 | Refills: 0 | Status: DISCONTINUED | OUTPATIENT
Start: 2018-04-18 | End: 2018-04-18

## 2018-04-18 RX ADMIN — HYDROMORPHONE HYDROCHLORIDE 1 MILLIGRAM(S): 2 INJECTION INTRAMUSCULAR; INTRAVENOUS; SUBCUTANEOUS at 11:26

## 2018-04-18 RX ADMIN — Medication 250 MILLIGRAM(S): at 17:16

## 2018-04-18 RX ADMIN — Medication 100 MILLIGRAM(S): at 06:03

## 2018-04-18 RX ADMIN — Medication 100 MILLIGRAM(S): at 21:53

## 2018-04-18 RX ADMIN — Medication 50 MILLIGRAM(S): at 14:28

## 2018-04-18 RX ADMIN — Medication 100 MILLIGRAM(S): at 14:38

## 2018-04-18 RX ADMIN — HYDROMORPHONE HYDROCHLORIDE 1 MILLIGRAM(S): 2 INJECTION INTRAMUSCULAR; INTRAVENOUS; SUBCUTANEOUS at 07:56

## 2018-04-18 RX ADMIN — HYDROMORPHONE HYDROCHLORIDE 1 MILLIGRAM(S): 2 INJECTION INTRAMUSCULAR; INTRAVENOUS; SUBCUTANEOUS at 12:30

## 2018-04-18 RX ADMIN — HEPARIN SODIUM 5000 UNIT(S): 5000 INJECTION INTRAVENOUS; SUBCUTANEOUS at 06:03

## 2018-04-18 RX ADMIN — Medication 250 MILLIGRAM(S): at 03:32

## 2018-04-18 RX ADMIN — HYDROMORPHONE HYDROCHLORIDE 1 MILLIGRAM(S): 2 INJECTION INTRAMUSCULAR; INTRAVENOUS; SUBCUTANEOUS at 12:03

## 2018-04-18 RX ADMIN — Medication 25 MILLIGRAM(S): at 00:36

## 2018-04-18 RX ADMIN — HYDROMORPHONE HYDROCHLORIDE 1 MILLIGRAM(S): 2 INJECTION INTRAMUSCULAR; INTRAVENOUS; SUBCUTANEOUS at 16:40

## 2018-04-18 RX ADMIN — Medication 2 MILLIGRAM(S): at 12:07

## 2018-04-18 RX ADMIN — HYDROMORPHONE HYDROCHLORIDE 0.5 MILLIGRAM(S): 2 INJECTION INTRAMUSCULAR; INTRAVENOUS; SUBCUTANEOUS at 00:51

## 2018-04-18 RX ADMIN — Medication 50 MILLIGRAM(S): at 03:40

## 2018-04-18 RX ADMIN — ONDANSETRON 4 MILLIGRAM(S): 8 TABLET, FILM COATED ORAL at 18:01

## 2018-04-18 RX ADMIN — Medication 100 MILLIGRAM(S): at 14:29

## 2018-04-18 RX ADMIN — HYDROMORPHONE HYDROCHLORIDE 1 MILLIGRAM(S): 2 INJECTION INTRAMUSCULAR; INTRAVENOUS; SUBCUTANEOUS at 12:00

## 2018-04-18 RX ADMIN — HYDROMORPHONE HYDROCHLORIDE 1 MILLIGRAM(S): 2 INJECTION INTRAMUSCULAR; INTRAVENOUS; SUBCUTANEOUS at 03:55

## 2018-04-18 RX ADMIN — HYDROMORPHONE HYDROCHLORIDE 0.5 MILLIGRAM(S): 2 INJECTION INTRAMUSCULAR; INTRAVENOUS; SUBCUTANEOUS at 23:29

## 2018-04-18 RX ADMIN — HYDROMORPHONE HYDROCHLORIDE 1 MILLIGRAM(S): 2 INJECTION INTRAMUSCULAR; INTRAVENOUS; SUBCUTANEOUS at 03:40

## 2018-04-18 RX ADMIN — HYDROMORPHONE HYDROCHLORIDE 1 MILLIGRAM(S): 2 INJECTION INTRAMUSCULAR; INTRAVENOUS; SUBCUTANEOUS at 21:30

## 2018-04-18 RX ADMIN — Medication 50 MILLIGRAM(S): at 20:48

## 2018-04-18 RX ADMIN — HYDROMORPHONE HYDROCHLORIDE 1 MILLIGRAM(S): 2 INJECTION INTRAMUSCULAR; INTRAVENOUS; SUBCUTANEOUS at 20:48

## 2018-04-18 RX ADMIN — HEPARIN SODIUM 5000 UNIT(S): 5000 INJECTION INTRAVENOUS; SUBCUTANEOUS at 14:38

## 2018-04-18 RX ADMIN — Medication 100 MILLIGRAM(S): at 23:29

## 2018-04-18 RX ADMIN — HYDROMORPHONE HYDROCHLORIDE 0.5 MILLIGRAM(S): 2 INJECTION INTRAMUSCULAR; INTRAVENOUS; SUBCUTANEOUS at 00:36

## 2018-04-18 RX ADMIN — HYDROMORPHONE HYDROCHLORIDE 1 MILLIGRAM(S): 2 INJECTION INTRAMUSCULAR; INTRAVENOUS; SUBCUTANEOUS at 08:09

## 2018-04-18 RX ADMIN — HEPARIN SODIUM 5000 UNIT(S): 5000 INJECTION INTRAVENOUS; SUBCUTANEOUS at 21:53

## 2018-04-18 RX ADMIN — Medication 100 MILLIGRAM(S): at 06:02
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
## 2018-04-18 NOTE — PROGRESS NOTE ADULT - SUBJECTIVE AND OBJECTIVE BOX
Acute Care Surgery Attending:    Requested by attending surgeon to opine on patient status. This is a 38 year old female with some medical comorbidity and protracted course following an abdominal wall hernia repair last year. Had a chronically draining sinus and non healing would in presence of chronic prosthetic overlay infection. This was treated in January 2018 with excision of infected mesh and fistula, abdominal wall reconstruction, and placement of biologic acellular dermal matrix. Unfortunately did not follow up with surgeon and returned to hospital with complaint of pain at wound. No fevers, chills or shortness of breath were reported. A CT scan of the abdomen and pelvis was performed and a rim enhancing fluid collection was seen abutting the mesh. Given her complaints she was taken to the operating room for evacuation of the fluid. In the OR what was seen was reportedly typical appearing seroma fluid. No organisms have been cultured from it as of yet. The wound was packed and has been left to heal by secondary intent. There is no evidence of hernia recurrence.     Currently the patient complains of chronic pain at the site and anxiety about aspects of her care including diet. The primary team in addressing.    From a surgical standpoint she appears to be doing well.     Gen: Sleeping on arrival but arousable. Cooperative but expressing frustration.  Wound examined: Edges are clean there is healthy granulation tissue. The wound tracts superiorly for about 7cm undermining. There is no evidence of dehiscence.    Assessment:   38 year old female. Readmitted with what may be sterile seroma which is common with acellular dermal matrix and Xenomatrix graft in particular. Now evacuated with some improvement in patient discomfort. Wound appears healthy.     Recommend:    Follow up OR cultures, and for now continue with excellent wound care. If plan to continue manual wound packing, consider BID changes unless VAC assisted closure device to be applied.    It is possible that her pannus and excess skin has caused difficulty with wound healing and may continue to do so. This wound may now be amenable to a delayed primary closure now that  seroma evacuation has been completed and I would recommend consultation with plastic and reconstructive surgery to discuss the possibility of panniculectomy and delayed closure this admission.     Pain and anxiety. This patient likely has underlying narcotic tolerance and potentially other related issues. Recommend pain management consultation and behavioral health consultation in light of her decisions regarding follow up and her overall care.

## 2018-04-18 NOTE — PROGRESS NOTE ADULT - ASSESSMENT
37 yo F h/o HTN, DM, HLD, MO, prior ventral hernia repair with wound infection    -admit to regional  -vanco/aztreonam  -regular diet  -ISS  -pain/nausea control  -HSQ/SCDs  -d/w chief on call and Dr Cheng 39 yo F h/o HTN, DM, HLD, MO, prior ventral hernia repair with wound infection  -vanco/aztreonam/flagyl  -regular diet  -ISS  -pain/nausea control  -HSQ/SCDs  -vac placement at bedside  -f/u micro  -f/u ID recs

## 2018-04-18 NOTE — PROGRESS NOTE ADULT - ASSESSMENT
37 yo obese female with DM, ventral hernia repair 1/2017 c/b polymicrobial wound infection/abdominal wall collection associated with hernia mesh (cx with E. faecalis, Actinomyces, Bacteroides, Prevotella, Peptostreptococcus, and Bacillus) s/p course of ertapenem (unclear how long she was actually treated). She subsequently underwent fistulectomy, drainage of abscess, removal of infected mesh and placement of biologic mesh on 3/14 (OR cx negative). Now she again presents with pelvic pain and malodorous drainage from abdominal wound s/p I&D 4/17. Doubt she is having a drug reaction to vancomycin, aztreonam, or metronidazole. She also has a nebulous penicillin allergy history.  - f/u OR cx  - PCN allergy skin testing  - continue vancomycin, aztreonam, metronidazole presently

## 2018-04-18 NOTE — PROGRESS NOTE ADULT - SUBJECTIVE AND OBJECTIVE BOX
INTERVAL HPI/OVERNIGHT EVENTS: s/p I&D of abdominal wall wound and fluid aspiration yesterday. Overnight, she self-removed her surgical packing. She also complains of maroon-colored rash with antibiotics (presently does not appear to have a rash).     CONSTITUTIONAL:  Negative fever or chills, feels well, good appetite  EYES:  Negative  blurry vision or double vision  CARDIOVASCULAR:  Negative for chest pain or palpitations  RESPIRATORY:  Negative for cough, wheezing, or SOB   GASTROINTESTINAL:  Negative for nausea, vomiting, diarrhea, constipation, or abdominal pain  GENITOURINARY:  Negative frequency, urgency or dysuria  NEUROLOGIC:  No headache, confusion, dizziness, lightheadedness      ANTIBIOTICS/RELEVANT:    MEDICATIONS  (STANDING):  aztreonam  IVPB 2000 milliGRAM(s) IV Intermittent every 8 hours  dextrose 5%. 1000 milliLiter(s) (50 mL/Hr) IV Continuous <Continuous>  dextrose 50% Injectable 12.5 Gram(s) IV Push once  dextrose 50% Injectable 25 Gram(s) IV Push once  dextrose 50% Injectable 25 Gram(s) IV Push once  heparin  Injectable 5000 Unit(s) SubCutaneous every 8 hours  insulin lispro (HumaLOG) corrective regimen sliding scale   SubCutaneous Before meals and at bedtime  LORazepam   Injectable 1 milliGRAM(s) IntraMuscular once  metroNIDAZOLE  IVPB 500 milliGRAM(s) IV Intermittent every 8 hours  vancomycin  IVPB 1500 milliGRAM(s) IV Intermittent every 12 hours    MEDICATIONS  (PRN):  dextrose Gel 1 Dose(s) Oral once PRN Blood Glucose LESS THAN 70 milliGRAM(s)/deciliter  diphenhydrAMINE   Capsule 50 milliGRAM(s) Oral every 6 hours PRN Rash and/or Itching  glucagon  Injectable 1 milliGRAM(s) IntraMuscular once PRN Glucose LESS THAN 70 milligrams/deciliter  HYDROmorphone  Injectable 0.5 milliGRAM(s) IV Push every 4 hours PRN Moderate Pain (4 - 6)  HYDROmorphone  Injectable 1 milliGRAM(s) IV Push every 4 hours PRN Severe Pain (7 - 10)        Vital Signs Last 24 Hrs  T(C): 37.1 (2018 14:05), Max: 37.1 (2018 14:05)  T(F): 98.8 (2018 14:05), Max: 98.8 (2018 14:05)  HR: 103 (2018 14:05) (76 - 103)  BP: 167/97 (2018 14:05) (127/84 - 167/97)  BP(mean): --  RR: 18 (2018 14:05) (16 - 19)  SpO2: 98% (2018 14:05) (98% - 100%)    PHYSICAL EXAM:  Constitutional:Well-developed, well nourished  Eyes:NAOMI, EOMI  Ear/Nose/Throat: no oral lesion, no sinus tenderness on percussion	  Neck:no JVD, no lymphadenopathy, supple  Respiratory: CTA cristi  Cardiovascular: S1S2 RRR, no murmurs  Gastrointestinal:soft, (+) BS, no HSM; surgical wound with packing; surrounding skin clean, minimal erythema   Extremities:no e/e/c  Vascular: DP Pulse:	right normal; left normal  Skin: no rash (in contrast to what the patient states)      LABS:                        11.7   8.9   )-----------( 333      ( 2018 06:45 )             37.9     -18    135  |  99  |  10  ----------------------------<  121<H>  4.0   |  26  |  0.83    Ca    8.8      2018 06:45  Phos  3.2     -18  Mg     2.1     04-18      PT/INR - ( 2018 03:14 )   PT: 12.6 sec;   INR: 1.13          PTT - ( 2018 03:14 )  PTT:31.8 sec  Urinalysis Basic - ( 2018 10:58 )    Color: Yellow / Appearance: Clear / S.020 / pH: x  Gluc: x / Ketone: NEGATIVE  / Bili: Negative / Urobili: 0.2 E.U./dL   Blood: x / Protein: NEGATIVE mg/dL / Nitrite: NEGATIVE   Leuk Esterase: NEGATIVE / RBC: x / WBC x   Sq Epi: x / Non Sq Epi: x / Bacteria: x        MICROBIOLOGY: Culture - Surgical Swab (18 @ 16:21)    Gram Stain:   No organisms seen  Few WBC's    Specimen Source: .Surgical Swab Abdominal Wound #2 - OR    Culture Results:   No growth to date        RADIOLOGY & ADDITIONAL STUDIES: reviewed

## 2018-04-18 NOTE — PROVIDER CONTACT NOTE (OTHER) - RECOMMENDATIONS
Discussed with team MD regarding nursing concern of patient's behavior dealing with her current situation that patient may benefit to see someone for her mental health. Given that patient had --

## 2018-04-18 NOTE — PROGRESS NOTE ADULT - SUBJECTIVE AND OBJECTIVE BOX
O/N: complaining of itchiness-benadryl ordered  4/17: Incision and drainage of abdominal collections cultures sent, remove gauze and place wound vac tomorrow, med consult will perfrom allergy test O/N: complaining of itchiness-benadryl ordered  : Incision and drainage of abdominal collections cultures sent, remove gauze and place wound vac tomorrow, med consult will perfrom allergy test        SUBJECTIVE: Patient c/o pain and discomfort. Patient seen and examined bedside by chief resident.    aztreonam  IVPB 2000 milliGRAM(s) IV Intermittent every 8 hours  heparin  Injectable 5000 Unit(s) SubCutaneous every 8 hours  metroNIDAZOLE  IVPB 500 milliGRAM(s) IV Intermittent every 8 hours  vancomycin  IVPB 1500 milliGRAM(s) IV Intermittent every 12 hours      Vital Signs Last 24 Hrs  T(C): 36.6 (2018 05:40), Max: 36.8 (2018 17:52)  T(F): 97.8 (2018 05:40), Max: 98.3 (2018 17:52)  HR: 76 (2018 05:40) (70 - 93)  BP: 127/84 (2018 05:40) (127/84 - 167/96)  BP(mean): 102 (2018 14:45) (102 - 102)  RR: 16 (2018 05:40) (16 - 18)  SpO2: 98% (2018 05:40) (90% - 100%)  I&O's Detail    2018 07:01  -  2018 07:00  --------------------------------------------------------  IN:    Oral Fluid: 480 mL    Solution: 1000 mL    Solution: 200 mL    Solution: 200 mL  Total IN: 1880 mL    OUT:    Voided: 400 mL  Total OUT: 400 mL    Total NET: 1480 mL          General: NAD, resting comfortably in bed  C/V: NSR  Pulm: Nonlabored breathing, no respiratory distress  Abd: soft, NT/ND, wet to dry dressing  in lower abd wound  Extrem: WWP, no edema      LABS:                        11.7   8.9   )-----------( 333      ( 2018 06:45 )             37.9     04-18    135  |  99  |  10  ----------------------------<  121<H>  4.0   |  26  |  0.83    Ca    8.8      2018 06:45  Phos  3.2     -18  Mg     2.1     -18      PT/INR - ( 2018 03:14 )   PT: 12.6 sec;   INR: 1.13          PTT - ( 2018 03:14 )  PTT:31.8 sec  Urinalysis Basic - ( 2018 10:58 )    Color: Yellow / Appearance: Clear / S.020 / pH: x  Gluc: x / Ketone: NEGATIVE  / Bili: Negative / Urobili: 0.2 E.U./dL   Blood: x / Protein: NEGATIVE mg/dL / Nitrite: NEGATIVE   Leuk Esterase: NEGATIVE / RBC: x / WBC x   Sq Epi: x / Non Sq Epi: x / Bacteria: x        RADIOLOGY & ADDITIONAL STUDIES:

## 2018-04-19 DIAGNOSIS — F43.29 ADJUSTMENT DISORDER WITH OTHER SYMPTOMS: ICD-10-CM

## 2018-04-19 LAB
ANION GAP SERPL CALC-SCNC: 14 MMOL/L — SIGNIFICANT CHANGE UP (ref 5–17)
BUN SERPL-MCNC: 5 MG/DL — LOW (ref 7–23)
CALCIUM SERPL-MCNC: 9.2 MG/DL — SIGNIFICANT CHANGE UP (ref 8.4–10.5)
CHLORIDE SERPL-SCNC: 97 MMOL/L — SIGNIFICANT CHANGE UP (ref 96–108)
CO2 SERPL-SCNC: 26 MMOL/L — SIGNIFICANT CHANGE UP (ref 22–31)
CREAT SERPL-MCNC: 0.68 MG/DL — SIGNIFICANT CHANGE UP (ref 0.5–1.3)
CULTURE RESULTS: SIGNIFICANT CHANGE UP
CULTURE RESULTS: SIGNIFICANT CHANGE UP
GLUCOSE BLDC GLUCOMTR-MCNC: 122 MG/DL — HIGH (ref 70–99)
GLUCOSE BLDC GLUCOMTR-MCNC: 137 MG/DL — HIGH (ref 70–99)
GLUCOSE BLDC GLUCOMTR-MCNC: 140 MG/DL — HIGH (ref 70–99)
GLUCOSE BLDC GLUCOMTR-MCNC: 168 MG/DL — HIGH (ref 70–99)
GLUCOSE SERPL-MCNC: 119 MG/DL — HIGH (ref 70–99)
HCT VFR BLD CALC: 40.9 % — SIGNIFICANT CHANGE UP (ref 34.5–45)
HGB BLD-MCNC: 13 G/DL — SIGNIFICANT CHANGE UP (ref 11.5–15.5)
MAGNESIUM SERPL-MCNC: 2.2 MG/DL — SIGNIFICANT CHANGE UP (ref 1.6–2.6)
MCHC RBC-ENTMCNC: 25.4 PG — LOW (ref 27–34)
MCHC RBC-ENTMCNC: 31.8 G/DL — LOW (ref 32–36)
MCV RBC AUTO: 80 FL — SIGNIFICANT CHANGE UP (ref 80–100)
PHOSPHATE SERPL-MCNC: 3.3 MG/DL — SIGNIFICANT CHANGE UP (ref 2.5–4.5)
PLATELET # BLD AUTO: 345 K/UL — SIGNIFICANT CHANGE UP (ref 150–400)
POTASSIUM SERPL-MCNC: 4 MMOL/L — SIGNIFICANT CHANGE UP (ref 3.5–5.3)
POTASSIUM SERPL-SCNC: 4 MMOL/L — SIGNIFICANT CHANGE UP (ref 3.5–5.3)
RBC # BLD: 5.11 M/UL — SIGNIFICANT CHANGE UP (ref 3.8–5.2)
RBC # FLD: 16.3 % — SIGNIFICANT CHANGE UP (ref 10.3–16.9)
SODIUM SERPL-SCNC: 137 MMOL/L — SIGNIFICANT CHANGE UP (ref 135–145)
SPECIMEN SOURCE: SIGNIFICANT CHANGE UP
SPECIMEN SOURCE: SIGNIFICANT CHANGE UP
VANCOMYCIN TROUGH SERPL-MCNC: 28 UG/ML — CRITICAL HIGH (ref 10–20)
WBC # BLD: 8.4 K/UL — SIGNIFICANT CHANGE UP (ref 3.8–10.5)
WBC # FLD AUTO: 8.4 K/UL — SIGNIFICANT CHANGE UP (ref 3.8–10.5)

## 2018-04-19 PROCEDURE — 99223 1ST HOSP IP/OBS HIGH 75: CPT

## 2018-04-19 PROCEDURE — 99233 SBSQ HOSP IP/OBS HIGH 50: CPT | Mod: GC

## 2018-04-19 RX ORDER — FLUCONAZOLE 150 MG/1
150 TABLET ORAL ONCE
Qty: 0 | Refills: 0 | Status: COMPLETED | OUTPATIENT
Start: 2018-04-19 | End: 2018-04-19

## 2018-04-19 RX ADMIN — Medication 2: at 17:31

## 2018-04-19 RX ADMIN — Medication 250 MILLIGRAM(S): at 04:09

## 2018-04-19 RX ADMIN — Medication 100 MILLIGRAM(S): at 13:56

## 2018-04-19 RX ADMIN — Medication 100 MILLIGRAM(S): at 08:17

## 2018-04-19 RX ADMIN — HYDROMORPHONE HYDROCHLORIDE 0.5 MILLIGRAM(S): 2 INJECTION INTRAMUSCULAR; INTRAVENOUS; SUBCUTANEOUS at 00:14

## 2018-04-19 RX ADMIN — FLUCONAZOLE 150 MILLIGRAM(S): 150 TABLET ORAL at 20:44

## 2018-04-19 RX ADMIN — HYDROMORPHONE HYDROCHLORIDE 1 MILLIGRAM(S): 2 INJECTION INTRAMUSCULAR; INTRAVENOUS; SUBCUTANEOUS at 06:43

## 2018-04-19 RX ADMIN — HYDROMORPHONE HYDROCHLORIDE 1 MILLIGRAM(S): 2 INJECTION INTRAMUSCULAR; INTRAVENOUS; SUBCUTANEOUS at 21:45

## 2018-04-19 RX ADMIN — HEPARIN SODIUM 5000 UNIT(S): 5000 INJECTION INTRAVENOUS; SUBCUTANEOUS at 06:05

## 2018-04-19 RX ADMIN — HEPARIN SODIUM 5000 UNIT(S): 5000 INJECTION INTRAVENOUS; SUBCUTANEOUS at 21:26

## 2018-04-19 RX ADMIN — HYDROMORPHONE HYDROCHLORIDE 1 MILLIGRAM(S): 2 INJECTION INTRAMUSCULAR; INTRAVENOUS; SUBCUTANEOUS at 16:00

## 2018-04-19 RX ADMIN — HYDROMORPHONE HYDROCHLORIDE 1 MILLIGRAM(S): 2 INJECTION INTRAMUSCULAR; INTRAVENOUS; SUBCUTANEOUS at 00:49

## 2018-04-19 RX ADMIN — HYDROMORPHONE HYDROCHLORIDE 1 MILLIGRAM(S): 2 INJECTION INTRAMUSCULAR; INTRAVENOUS; SUBCUTANEOUS at 15:43

## 2018-04-19 RX ADMIN — Medication 250 MILLIGRAM(S): at 17:31

## 2018-04-19 RX ADMIN — Medication 50 MILLIGRAM(S): at 13:55

## 2018-04-19 RX ADMIN — HYDROMORPHONE HYDROCHLORIDE 1 MILLIGRAM(S): 2 INJECTION INTRAMUSCULAR; INTRAVENOUS; SUBCUTANEOUS at 21:26

## 2018-04-19 RX ADMIN — HYDROMORPHONE HYDROCHLORIDE 1 MILLIGRAM(S): 2 INJECTION INTRAMUSCULAR; INTRAVENOUS; SUBCUTANEOUS at 01:09

## 2018-04-19 RX ADMIN — HYDROMORPHONE HYDROCHLORIDE 1 MILLIGRAM(S): 2 INJECTION INTRAMUSCULAR; INTRAVENOUS; SUBCUTANEOUS at 07:28

## 2018-04-19 RX ADMIN — Medication 50 MILLIGRAM(S): at 22:46

## 2018-04-19 RX ADMIN — HYDROMORPHONE HYDROCHLORIDE 1 MILLIGRAM(S): 2 INJECTION INTRAMUSCULAR; INTRAVENOUS; SUBCUTANEOUS at 11:15

## 2018-04-19 RX ADMIN — Medication 50 MILLIGRAM(S): at 07:03

## 2018-04-19 RX ADMIN — HYDROMORPHONE HYDROCHLORIDE 1 MILLIGRAM(S): 2 INJECTION INTRAMUSCULAR; INTRAVENOUS; SUBCUTANEOUS at 10:57

## 2018-04-19 RX ADMIN — Medication 100 MILLIGRAM(S): at 06:58

## 2018-04-19 RX ADMIN — Medication 100 MILLIGRAM(S): at 22:47

## 2018-04-19 RX ADMIN — HEPARIN SODIUM 5000 UNIT(S): 5000 INJECTION INTRAVENOUS; SUBCUTANEOUS at 13:56

## 2018-04-19 NOTE — BEHAVIORAL HEALTH ASSESSMENT NOTE - OTHER PAST PSYCHIATRIC HISTORY (INCLUDE DETAILS REGARDING ONSET, COURSE OF ILLNESS, INPATIENT/OUTPATIENT TREATMENT)
Reports prior treatment with psychiatrist and therapist in setting of grandfather's death.  Denies prior medications for sleep, anxiety, depression.  Substance use screening deferred.

## 2018-04-19 NOTE — DIETITIAN INITIAL EVALUATION ADULT. - NS AS NUTRI INTERV MEALS SNACK
General/healthful diet/Honor pts food preferences w/in dietary restrictions- encourage protein options at meals.

## 2018-04-19 NOTE — BEHAVIORAL HEALTH ASSESSMENT NOTE - NSBHADMITCOUNSEL_PSY_A_CORE
risk factor reduction/instructions for management, treatment and follow up/diagnostic results/impressions and/or recommended studies/importance of adherence to chosen treatment/prognosis/risks and benefits of treatment options/client/family/caregiver education

## 2018-04-19 NOTE — BEHAVIORAL HEALTH ASSESSMENT NOTE - HPI (INCLUDE ILLNESS QUALITY, SEVERITY, DURATION, TIMING, CONTEXT, MODIFYING FACTORS, ASSOCIATED SIGNS AND SYMPTOMS)
38F PPH outpatient treatment, unclear prior diagnoses, PMH HTN, DM, HLD, MO, prior ventral hernia repair 1/2017, complicated by wound infection requiring further subsequent surgical procedures and hospital admissions.  Patient currently admitted for management of wound infection.  Psychiatry consulted as patient has had several negative interactions with staff, made accusations regarding nonsterile surgical procedures and lack of pain management (although patient documented to refuse pain medication), reported questionable antibiotic allergic reactions, and appeared to be medication seeking.  Per primary team patient reporting depression and panic attacks in setting of ongoing admission and surgical attending recommended psychiatric consult to patient.    Patient refused interview with psychologist this morning.  Patient overheard to have irritable encounter with staff member prior to my interview.  Interview limited as patient suspicious, irritable.  Repeatedly expressed frustration that a psychiatry consult had been called and claimed she had not been told in advance.  Says she is not depressed or anxious but that she is in pain due to her surgical wound.  Says that pain medications last for only an hour.  Says she screams during dressing changes but believes she has otherwise been tolerating her hospitalization well.  Reports poor sleep due to hospitalization and poor appetite in setting of nausea.  Reports remote outpatient visits with psychiatrist and therapist in the setting of her grandfather's death, 15 years ago. 38F PPH outpatient treatment, unclear prior diagnoses, PMH HTN, DM, HLD, MO, prior ventral hernia repair 1/2017, complicated by wound infection requiring further subsequent surgical procedures and hospital admissions.  Patient currently admitted for management of wound infection.  Psychiatry consulted as patient has had several negative interactions with staff, made accusations regarding nonsterile surgical procedures and lack of pain management (although patient documented to refuse pain medication), reported questionable antibiotic allergic reactions, and appeared to be medication seeking.  Per primary team patient reporting depression and panic attacks in setting of ongoing admission and surgical attending recommended psychiatric consult to patient.    Patient refused interview with psychologist this morning.  Patient overheard to have irritable encounter with staff member prior to my interview.  Interview limited as patient suspicious, irritable.  Repeatedly expressed frustration that a psychiatry consult had been called and claimed she had not been told in advance.  Says she is not depressed or anxious but that she is in pain due to her surgical wound.  Says that pain medications last for only an hour.  Says she screams during dressing changes but believes she has otherwise been tolerating her hospitalization well.  Reports poor sleep due to hospitalization and poor appetite in setting of nausea.  Reports remote outpatient visits with psychiatrist and therapist in the setting of her grandfather's death, 15 years ago.  Soheila with stress by coloring and listening to music.  Facetimes with her children daily, who are staying with her mother.

## 2018-04-19 NOTE — PROGRESS NOTE ADULT - SUBJECTIVE AND OBJECTIVE BOX
O/N: vanc trough 14, FAUSTINO  4/18: no vac placed, wet to dry, vanc trough pending OVERNIGHT EVENTS:  vanc trough 14, FAUSTINO  : no vac placed, wet to dry, vanc trough pending  OVERNIGHT EVENTS:      SUBJECTIVE: Patient examined bedside patient refused pain medication before dressing change. Patient refuses bedside daily dressing change when chief resident ask to change.  Patient told the benefits of changing wound dressing daily and continues to refuses.  Flatus: [] YES [X] NO             Bowel Movement: [ ] YES [X ] NO  Pain (0-10):            Pain Control Adequate: [X ] YES [ ] NO  Nausea: [ ] YES [X ] NO            Vomiting: [ ] YES [X ] NO  Diarrhea: [ ] YES [X] NO         Constipation: [ ] YES [X ] NO     Chest Pain: [ ] YES [X ] NO    SOB:  [ ] YES [ X] NO    aztreonam  IVPB 2000 milliGRAM(s) IV Intermittent every 8 hours  heparin  Injectable 5000 Unit(s) SubCutaneous every 8 hours  metroNIDAZOLE  IVPB 500 milliGRAM(s) IV Intermittent every 8 hours  vancomycin  IVPB 1500 milliGRAM(s) IV Intermittent every 12 hours      Vital Signs Last 24 Hrs  T(C): 36.9 (2018 06:07), Max: 37.1 (2018 14:05)  T(F): 98.4 (2018 06:07), Max: 98.8 (2018 14:05)  HR: 92 (2018 06:07) (76 - 103)  BP: 154/84 (2018 06:07) (136/84 - 167/97)  BP(mean): --  RR: 16 (2018 06:07) (16 - 19)  SpO2: 98% (2018 06:07) (98% - 100%)  I&O's Detail    2018 07:01  -  2018 07:00  --------------------------------------------------------  IN:    Oral Fluid: 1380 mL    Solution: 200 mL    Solution: 200 mL    Solution: 1000 mL  Total IN: 2780 mL    OUT:    Voided: 2250 mL  Total OUT: 2250 mL    Total NET: 530 mL          General: NAD, resting comfortably in bed  C/V: NSR  Pulm: Nonlabored breathing, no respiratory distress  Abd: soft, non distended, TTP around incision site, pink granulating tissue noted at incision site  Extrem: WWP, no edema, SCDs in place        LABS:                        11.7   8.9   )-----------( 333      ( 2018 06:45 )             37.9     04-18    135  |  99  |  10  ----------------------------<  121<H>  4.0   |  26  |  0.83    Ca    8.8      2018 06:45  Phos  3.2     -18  Mg     2.1     -18        Urinalysis Basic - ( 2018 10:58 )    Color: Yellow / Appearance: Clear / S.020 / pH: x  Gluc: x / Ketone: NEGATIVE  / Bili: Negative / Urobili: 0.2 E.U./dL   Blood: x / Protein: NEGATIVE mg/dL / Nitrite: NEGATIVE   Leuk Esterase: NEGATIVE / RBC: x / WBC x   Sq Epi: x / Non Sq Epi: x / Bacteria: x        RADIOLOGY & ADDITIONAL STUDIES:

## 2018-04-19 NOTE — PROGRESS NOTE ADULT - ASSESSMENT
38 year old female w/ pmhx of DM, ventral hernia repair 1/2017, complicated by polymicrobial wound infection w/ abdominal wall collection associated by hernia mesh (cx with E. faecalis, Actinomyces, Bacteroides, Prevotella, Peptostreptococcus, and Bacillus) s/p course of ertapenem.  She subsequently underwent fistulectomy, drainage of abscess, removal of infected mesh and placement of biologic mesh on 3/14 (OR cx negative). Now she again presents with pelvic pain and malodorous drainage from abdominal wound s/p I&D 4/17  - follow up OR cultures   - PCN allergy skin testing if able   - continue current management with vancomycin, aztreonam, metronidazole   - vancomycin trough before every 4th dose     ID will follow

## 2018-04-19 NOTE — BEHAVIORAL HEALTH ASSESSMENT NOTE - NSBHCHARTREVIEWVS_PSY_A_CORE FT
Vital Signs Last 24 Hrs  T(C): 36.4 (19 Apr 2018 13:47), Max: 37 (18 Apr 2018 17:41)  T(F): 97.6 (19 Apr 2018 13:47), Max: 98.6 (18 Apr 2018 17:41)  HR: 95 (19 Apr 2018 13:47) (78 - 95)  BP: 135/88 (19 Apr 2018 13:47) (135/88 - 158/88)  BP(mean): --  RR: 17 (19 Apr 2018 13:47) (16 - 17)  SpO2: 99% (19 Apr 2018 13:47) (98% - 99%)

## 2018-04-19 NOTE — PROGRESS NOTE ADULT - ASSESSMENT
39 yo F h/o HTN, DM, HLD, MO, prior ventral hernia repair with wound infection    -vanco/aztreonam/flagyl  -f/u ID re: outpatient recs  -regular diet  -ISS  -pain/nausea control  -HSQ/SCDs  -wet to dry dressing to wound  -f/u micro

## 2018-04-19 NOTE — PROVIDER CONTACT NOTE (OTHER) - ACTION/TREATMENT ORDERED:
demonstrated increase in agitation which required her to be given Ativan 2mg IVP by team MD. Will continue to monitor. presently, pt resting in bed comfortably and sleeps intermittently.
MD Carrero aware. No further instruction given.

## 2018-04-19 NOTE — PROVIDER CONTACT NOTE (OTHER) - SITUATION
wound packing change done by Team MD with Dr. Hurtado (Attending) at noon time. medicated for c/o pain with Dilaudid 2mg IVP total and Ativan 2mgIVP by Dr. Carrero due to patent's agitation. Before the ---
IV access was removed due to site with trace of swelling and c/o pain at site. Unsuccessful IV access insertion by two RNs already. pt declined for further IV access insertion by nursing despite ----
IV access was successfully inserted by Dr. Carrero at pt's right hand, went to administer Aztreonam IV however, pt refused despite education given and had verbalized her understanding. Pt had requested -
s/p  I&D of abdominal collection 4/17

## 2018-04-19 NOTE — PROVIDER CONTACT NOTE (OTHER) - ASSESSMENT
pt was calm and nice and then next moment, pt was cursing and complaining about food, her doctors, her wound and nursing. Nursing leadership had been involved with patient to meet patient's expections for pt's better stay. Patient experience representative and Patient advocate along with Team MD Carrero and nursing team had seen patient at bedside together. Given that patient received Ativan 2mg IVP with good effect. pt now resting in bed comfortably and calm.
Instructed by MD Carrero to give 1mg dilaudid for pain control before dressing change. Pt refused. Pt educated about importance of dressing changes to promote wound healing. Pt still refused to be pre medicated.
Per Dr. Carrero, he will try to put IV access on pt.
using foul language towards staff, calling the nurse and doctors "stupid". Pt was also upset and cursing regarding IV access was inserted by Dr. Carrero on pt's right hand, however, pt was difficult stick with IV access insertion.

## 2018-04-19 NOTE — BEHAVIORAL HEALTH ASSESSMENT NOTE - SUMMARY
38F reportedly minimal psych history but unclear, with hernia repair last year complicated by wound infection and subsequent admissions and surgeries.  Patient reportedly hostile with staff, contradictory regarding medical management.  Attempted to build rapport with patient today as she was initially unreceptive to psychiatric consultation.  Will attempt remainder of assessment tomorrow (further psych history, substance history).  Patient does not appear to be acutely psychotic or a danger to herself at this time.  Likely with very poor coping strategies and struggling with repeat admission.  She agrees to prn trazodone for insomnia.  Will continue to provide support.

## 2018-04-19 NOTE — CONSULT NOTE ADULT - SUBJECTIVE AND OBJECTIVE BOX
Pain Management Consult Note - [facility] [phone]    Chief Complaint: Abdominal pain    HPI: Called to evaluate 38 y.o. female c/o abdominal pain s/p ventral hernia repair wound infection. Patient seen laying in bed this afternoon in [facility]. States pain is severe. She states she takes "whatever pain medication I can get prescribed" at home, including dilaudid and percocet. She states she takes Mortin 800mg when she does not have a prescription medication available.      Pain is __x_ sharp ____dull _x__burning ___achy ___ Intensity: ____ mild ___mod _x__severe     Location __x__surgical site ____cervical _____lumbar __x__abd ____upper ext____lower ext    Worse with __x__activity ___x_movement _____physical therapy___ Rest    Improved with __x__medication _x___rest ____physical therapy    ROS: Const:  ___febrile   Eyes:___ENT:___CV: __-_chest pain  Resp: _-___sob  GI:___nausea ___vomiting _x__abd pain _-__npo ___clears __full diet __bm  :___ Musk: __x_pain ___spasm  Skin:___ Neuro:  ___sedation___confusion___ numbness ___weakness ___paresth  Psych:__anxiety  Endo:___ Heme:___Allergy:_________, _x__all others reviewed and negative    PAST MEDICAL & SURGICAL HISTORY:  Abdominal hernia  Obesity  Diabetes  Hyperlipidemia  Essential hypertension  H/O ventral hernia repair  H/O:   History of D&C  H/O LEEP      SH: _-__Tobacco   _x__Alcohol                          FH:FAMILY HISTORY:  No pertinent family history in first degree relatives      aztreonam  IVPB 2000 milliGRAM(s) IV Intermittent every 8 hours  dextrose 5%. 1000 milliLiter(s) IV Continuous <Continuous>  dextrose 50% Injectable 12.5 Gram(s) IV Push once  dextrose 50% Injectable 25 Gram(s) IV Push once  dextrose 50% Injectable 25 Gram(s) IV Push once  dextrose Gel 1 Dose(s) Oral once PRN  diphenhydrAMINE   Capsule 50 milliGRAM(s) Oral every 6 hours PRN  glucagon  Injectable 1 milliGRAM(s) IntraMuscular once PRN  heparin  Injectable 5000 Unit(s) SubCutaneous every 8 hours  HYDROmorphone  Injectable 0.5 milliGRAM(s) IV Push every 4 hours PRN  HYDROmorphone  Injectable 1 milliGRAM(s) IV Push every 4 hours PRN  insulin lispro (HumaLOG) corrective regimen sliding scale   SubCutaneous Before meals and at bedtime  LORazepam   Injectable 1 milliGRAM(s) IntraMuscular once  LORazepam   Injectable 2 milliGRAM(s) IntraMuscular once  metroNIDAZOLE  IVPB 500 milliGRAM(s) IV Intermittent every 8 hours  ondansetron Injectable 4 milliGRAM(s) IV Push every 6 hours PRN  vancomycin  IVPB 1500 milliGRAM(s) IV Intermittent every 12 hours      T(C): 36.4 (19-18 @ 13:47), Max: 37 (18-18 @ 17:41)  HR: 95 (-18 @ 13:47) (78 - 95)  BP: 135/88 (-19-18 @ 13:47) (135/88 - 158/88)  RR: 17 (-19-18 @ 13:47) (16 - 17)  SpO2: 99% (19-18 @ 13:47) (98% - 99%)  Wt(kg): --    T(C): 36.4 (-18 @ 13:47), Max: 37 (18-18 @ 17:41)  HR: 95 (19-18 @ 13:47) (78 - 95)  BP: 135/88 (19-18 @ 13:47) (135/88 - 158/88)  RR: 17 (19-18 @ 13:47) (16 - 17)  SpO2: 99% (19-18 @ 13:47) (98% - 99%)  Wt(kg): --    T(C): 36.4 (-19-18 @ 13:47), Max: 37 (-18-18 @ 17:41)  HR: 95 (19-18 @ 13:47) (78 - 95)  BP: 135/88 (18 @ 13:47) (135/88 - 158/88)  RR: 17 (18 @ 13:47) (16 - 17)  SpO2: 99% (18 @ 13:47) (98% - 99%)  Wt(kg): --    PHYSICAL EXAM:  Gen Appearance: __x_no acute distress _x__appropriate        Neuro: _x__SILT feet____ EOM Intact Psych: AAOX_3_, __x_mood/affect appropriate        Eyes: _x__conjunctiva WNL  _____ Pupils equal and round        ENT: _x__ears and nose atraumatic_x__ Hearing grossly intact        Neck: _x__trachea midline, no visible masses ___thyroid without palpable mass    Resp: _x__Nml WOB____No tactile fremitus ___clear to auscultation    Cardio: _x__extremities free from edema __x__pedal pulses palpable    GI/Abdomen: __x_soft __x___ Nontender___x___Nondistended_____HSM    Lymphatic: _x__no palpable nodes in neck  ___no palpable nodes calves and feet    Skin/Wound: __x_Incision, ___Dressing c/d/i,   ___x_surrounding tissues soft,  ___drain/chest tube present____    Muscular: EHL _5__/5  Gastrocnemius_5__/5    _x__absent clubbing/cyanosis      ASSESSMENT: This is a 38y old Female with a history of   POSTOPERATIVE INTRAABDOMINAL ABCESS  No h/o HF  No pertinent family history in first degree relatives  Handoff  MEWS Score  Abdominal hernia  Hernia  Obesity  Diabetes  Hyperlipidemia  Essential hypertension  Pelvic fluid collection  Abdominal wall fluid collections  Pelvic fluid collection  Abdominal wall fluid collections  Postoperative intra-abdominal abscess  Adjustment disorder with disturbance of emotion  Incision and drainage  H/O ventral hernia repair  H/O:   History of D&C  H/O LEEP  WOUND CHECK  23      Recommended Treatment PLAN:    1. Recommend Dilaudid 2mg po q4 prn moderate pain.  2. Recommend Dilaudid 0.5mg IV q4 prn severe breakthrough pain.  3. Recommend Dilaudid 1mg IV daily PRN dressing change.  4. Suggest OB/GYN consult as patient is c/o discharge and discomfort.

## 2018-04-19 NOTE — DIETITIAN INITIAL EVALUATION ADULT. - ENERGY NEEDS
Height: 5'7" Weight: 210lbs, IBW 135lbs+/-10%, %%, BMI 32.9  IBW used for calculations as pt >120% of IBW   Nutrient needs based on [facility] standards of care for maintenance in adults.  Needs adjusted for wound healing, infection, potential unintentional wt loss.

## 2018-04-19 NOTE — PROVIDER CONTACT NOTE (OTHER) - BACKGROUND
wound care by team MD, pt had been screaming and cursing using foul derogatory language towards the doctors including with Dr. Hurtado. Noted that patient had been having labile behavior, one moment---
Hx of ventral hernia repair sx, requiring IR drainage, Explant of infected mesh and repaired with biologic mesh and vac placement                              ,
education and encouragement given and pt had verbalized her understanding. Pt was NPO for possible OR/IR procedure today. IV fluid, and IV antibiotics needed.
pain medication however, pt refused Percocet offered. pt wanted Dilaudid IV however, no order for Dilaudid IV and Dr. Carrero said "NO" to Dilaudid IV medication for patient. Pt was in the room cursing -

## 2018-04-19 NOTE — PROGRESS NOTE ADULT - SUBJECTIVE AND OBJECTIVE BOX
INTERVAL HPI/OVERNIGHT EVENTS:  No overnight events. Patient seen and examined at bedside. Still with complaints of abdominal pain. Complaints of skin itchiness as well.     CONSTITUTIONAL:  Negative fever or chills, feels well, good appetite  EYES:  Negative  blurry vision or double vision  CARDIOVASCULAR:  Negative for chest pain or palpitations  RESPIRATORY:  Negative for cough, wheezing, or SOB   GENITOURINARY:  Negative frequency, urgency or dysuria  NEUROLOGIC:  No headache, confusion, dizziness, lightheadedness      ANTIBIOTICS/RELEVANT:    MEDICATIONS  (STANDING):  aztreonam  IVPB 2000 milliGRAM(s) IV Intermittent every 8 hours  dextrose 5%. 1000 milliLiter(s) (50 mL/Hr) IV Continuous <Continuous>  dextrose 50% Injectable 12.5 Gram(s) IV Push once  dextrose 50% Injectable 25 Gram(s) IV Push once  dextrose 50% Injectable 25 Gram(s) IV Push once  heparin  Injectable 5000 Unit(s) SubCutaneous every 8 hours  insulin lispro (HumaLOG) corrective regimen sliding scale   SubCutaneous Before meals and at bedtime  LORazepam   Injectable 1 milliGRAM(s) IntraMuscular once  LORazepam   Injectable 2 milliGRAM(s) IntraMuscular once  metroNIDAZOLE  IVPB 500 milliGRAM(s) IV Intermittent every 8 hours  vancomycin  IVPB 1500 milliGRAM(s) IV Intermittent every 12 hours    MEDICATIONS  (PRN):  dextrose Gel 1 Dose(s) Oral once PRN Blood Glucose LESS THAN 70 milliGRAM(s)/deciliter  diphenhydrAMINE   Capsule 50 milliGRAM(s) Oral every 6 hours PRN Rash and/or Itching  glucagon  Injectable 1 milliGRAM(s) IntraMuscular once PRN Glucose LESS THAN 70 milligrams/deciliter  HYDROmorphone  Injectable 0.5 milliGRAM(s) IV Push every 4 hours PRN Moderate Pain (4 - 6)  HYDROmorphone  Injectable 1 milliGRAM(s) IV Push every 4 hours PRN Severe Pain (7 - 10)  ondansetron Injectable 4 milliGRAM(s) IV Push every 6 hours PRN Nausea and/or Vomiting        Vital Signs Last 24 Hrs  T(C): 36.7 (19 Apr 2018 16:30), Max: 37 (18 Apr 2018 17:41)  T(F): 98 (19 Apr 2018 16:30), Max: 98.6 (18 Apr 2018 17:41)  HR: 85 (19 Apr 2018 16:30) (78 - 95)  BP: 147/81 (19 Apr 2018 16:30) (135/88 - 158/88)  BP(mean): --  RR: 17 (19 Apr 2018 16:30) (16 - 17)  SpO2: 100% (19 Apr 2018 16:30) (98% - 100%)    04-18-18 @ 07:01  -  04-19-18 @ 07:00  --------------------------------------------------------  IN: 2780 mL / OUT: 2250 mL / NET: 530 mL    04-19-18 @ 07:01  -  04-19-18 @ 16:47  --------------------------------------------------------  IN: 600 mL / OUT: 900 mL / NET: -300 mL      PHYSICAL EXAM:  Constitutional:Well-developed, well nourished  Eyes:NAOMI, EOMI  Ear/Nose/Throat: no oral lesion, no sinus tenderness on percussion	  Neck:no JVD, no lymphadenopathy, supple  Respiratory: CTA cristi  Cardiovascular: S1S2 RRR, no murmurs  Gastrointestinal:soft, (+) BS, no HSM; surgical wound with packing; surrounding skin clean, minimal erythema   Extremities:no e/e/c  Vascular: DP Pulse:	right normal; left normal  Skin: no rash     LABS:                        13.0   8.4   )-----------( 345      ( 19 Apr 2018 08:19 )             40.9     04-19    137  |  97  |  5<L>  ----------------------------<  119<H>  4.0   |  26  |  0.68    Ca    9.2      19 Apr 2018 08:19  Phos  3.3     04-19  Mg     2.2     04-19            MICROBIOLOGY:  Culture - Fungal, Other (04.18.18 @ 00:47)    Specimen Source: .Other Abdominal Wound - OR    Culture Results:   Testing in progress    Culture - Acid Fast - Other w/Smear (04.18.18 @ 00:47)    Specimen Source: .Other Abdominal Wound - OR    Acid Fast Bacilli Smear:   No acid fast bacilli seen by fluorochrome stain    Culture - Acid Fast - Other w/Smear (04.18.18 @ 00:28)    Specimen Source: .Other Abdominal Wound #2 - OR    Acid Fast Bacilli Smear:   No acid fast bacilli seen by fluorochrome stain    Culture - Fungal, Other (04.18.18 @ 00:28)    Specimen Source: .Other Abdominal Wound #2 - OR    Culture Results:   Testing in progress          RADIOLOGY & ADDITIONAL STUDIES:

## 2018-04-19 NOTE — DIETITIAN INITIAL EVALUATION ADULT. - NS FNS WEIGHT CHANGE REASON
unintentional/potential 20lb wt loss over last ~2mo. Discussed w/ RN obtaining new wt for accuracy- as bed scale does not indicate change.

## 2018-04-19 NOTE — BEHAVIORAL HEALTH ASSESSMENT NOTE - NSBHCHARTREVIEWLAB_PSY_A_CORE FT
13.0   8.4   )-----------( 345      ( 19 Apr 2018 08:19 )             40.9   04-19    137  |  97  |  5<L>  ----------------------------<  119<H>  4.0   |  26  |  0.68    Ca    9.2      19 Apr 2018 08:19  Phos  3.3     04-19  Mg     2.2     04-19

## 2018-04-20 LAB
-  CEFAZOLIN: SIGNIFICANT CHANGE UP
-  CLINDAMYCIN: SIGNIFICANT CHANGE UP
-  ERYTHROMYCIN: SIGNIFICANT CHANGE UP
-  LINEZOLID: SIGNIFICANT CHANGE UP
-  OXACILLIN: SIGNIFICANT CHANGE UP
-  PENICILLIN: SIGNIFICANT CHANGE UP
-  RIFAMPIN: SIGNIFICANT CHANGE UP
-  TRIMETHOPRIM/SULFAMETHOXAZOLE: SIGNIFICANT CHANGE UP
-  VANCOMYCIN: SIGNIFICANT CHANGE UP
ANION GAP SERPL CALC-SCNC: 12 MMOL/L — SIGNIFICANT CHANGE UP (ref 5–17)
BUN SERPL-MCNC: 7 MG/DL — SIGNIFICANT CHANGE UP (ref 7–23)
CALCIUM SERPL-MCNC: 9.1 MG/DL — SIGNIFICANT CHANGE UP (ref 8.4–10.5)
CHLORIDE SERPL-SCNC: 97 MMOL/L — SIGNIFICANT CHANGE UP (ref 96–108)
CO2 SERPL-SCNC: 26 MMOL/L — SIGNIFICANT CHANGE UP (ref 22–31)
CREAT SERPL-MCNC: 0.72 MG/DL — SIGNIFICANT CHANGE UP (ref 0.5–1.3)
CULTURE RESULTS: SIGNIFICANT CHANGE UP
GLUCOSE BLDC GLUCOMTR-MCNC: 106 MG/DL — HIGH (ref 70–99)
GLUCOSE BLDC GLUCOMTR-MCNC: 129 MG/DL — HIGH (ref 70–99)
GLUCOSE BLDC GLUCOMTR-MCNC: 133 MG/DL — HIGH (ref 70–99)
GLUCOSE BLDC GLUCOMTR-MCNC: 159 MG/DL — HIGH (ref 70–99)
GLUCOSE SERPL-MCNC: 125 MG/DL — HIGH (ref 70–99)
HCT VFR BLD CALC: 39.2 % — SIGNIFICANT CHANGE UP (ref 34.5–45)
HGB BLD-MCNC: 12.1 G/DL — SIGNIFICANT CHANGE UP (ref 11.5–15.5)
MAGNESIUM SERPL-MCNC: 1.9 MG/DL — SIGNIFICANT CHANGE UP (ref 1.6–2.6)
MCHC RBC-ENTMCNC: 24.6 PG — LOW (ref 27–34)
MCHC RBC-ENTMCNC: 30.9 G/DL — LOW (ref 32–36)
MCV RBC AUTO: 79.7 FL — LOW (ref 80–100)
METHOD TYPE: SIGNIFICANT CHANGE UP
ORGANISM # SPEC MICROSCOPIC CNT: SIGNIFICANT CHANGE UP
ORGANISM # SPEC MICROSCOPIC CNT: SIGNIFICANT CHANGE UP
PHOSPHATE SERPL-MCNC: 3.4 MG/DL — SIGNIFICANT CHANGE UP (ref 2.5–4.5)
PLATELET # BLD AUTO: 304 K/UL — SIGNIFICANT CHANGE UP (ref 150–400)
POTASSIUM SERPL-MCNC: 3.6 MMOL/L — SIGNIFICANT CHANGE UP (ref 3.5–5.3)
POTASSIUM SERPL-SCNC: 3.6 MMOL/L — SIGNIFICANT CHANGE UP (ref 3.5–5.3)
RBC # BLD: 4.92 M/UL — SIGNIFICANT CHANGE UP (ref 3.8–5.2)
RBC # FLD: 16.1 % — SIGNIFICANT CHANGE UP (ref 10.3–16.9)
SODIUM SERPL-SCNC: 135 MMOL/L — SIGNIFICANT CHANGE UP (ref 135–145)
SPECIMEN SOURCE: SIGNIFICANT CHANGE UP
VANCOMYCIN TROUGH SERPL-MCNC: 10 UG/ML — SIGNIFICANT CHANGE UP (ref 10–20)
WBC # BLD: 6.5 K/UL — SIGNIFICANT CHANGE UP (ref 3.8–10.5)
WBC # FLD AUTO: 6.5 K/UL — SIGNIFICANT CHANGE UP (ref 3.8–10.5)

## 2018-04-20 PROCEDURE — 99233 SBSQ HOSP IP/OBS HIGH 50: CPT | Mod: GC

## 2018-04-20 RX ORDER — METOPROLOL TARTRATE 50 MG
5 TABLET ORAL ONCE
Qty: 0 | Refills: 0 | Status: DISCONTINUED | OUTPATIENT
Start: 2018-04-20 | End: 2018-04-22

## 2018-04-20 RX ORDER — HYDROMORPHONE HYDROCHLORIDE 2 MG/ML
2 INJECTION INTRAMUSCULAR; INTRAVENOUS; SUBCUTANEOUS EVERY 4 HOURS
Qty: 0 | Refills: 0 | Status: DISCONTINUED | OUTPATIENT
Start: 2018-04-20 | End: 2018-04-22

## 2018-04-20 RX ORDER — LINEZOLID 600 MG/300ML
600 INJECTION, SOLUTION INTRAVENOUS EVERY 12 HOURS
Qty: 0 | Refills: 0 | Status: DISCONTINUED | OUTPATIENT
Start: 2018-04-20 | End: 2018-04-22

## 2018-04-20 RX ORDER — SENNA PLUS 8.6 MG/1
2 TABLET ORAL AT BEDTIME
Qty: 0 | Refills: 0 | Status: DISCONTINUED | OUTPATIENT
Start: 2018-04-20 | End: 2018-04-22

## 2018-04-20 RX ORDER — SIMETHICONE 80 MG/1
80 TABLET, CHEWABLE ORAL ONCE
Qty: 0 | Refills: 0 | Status: COMPLETED | OUTPATIENT
Start: 2018-04-20 | End: 2018-04-20

## 2018-04-20 RX ORDER — TRAZODONE HCL 50 MG
25 TABLET ORAL AT BEDTIME
Qty: 0 | Refills: 0 | Status: DISCONTINUED | OUTPATIENT
Start: 2018-04-20 | End: 2018-04-20

## 2018-04-20 RX ORDER — HYDROMORPHONE HYDROCHLORIDE 2 MG/ML
0.5 INJECTION INTRAMUSCULAR; INTRAVENOUS; SUBCUTANEOUS EVERY 4 HOURS
Qty: 0 | Refills: 0 | Status: DISCONTINUED | OUTPATIENT
Start: 2018-04-20 | End: 2018-04-22

## 2018-04-20 RX ORDER — POTASSIUM CHLORIDE 20 MEQ
40 PACKET (EA) ORAL ONCE
Qty: 0 | Refills: 0 | Status: COMPLETED | OUTPATIENT
Start: 2018-04-20 | End: 2018-04-20

## 2018-04-20 RX ORDER — FLUCONAZOLE 150 MG/1
150 TABLET ORAL DAILY
Qty: 0 | Refills: 0 | Status: DISCONTINUED | OUTPATIENT
Start: 2018-04-20 | End: 2018-04-22

## 2018-04-20 RX ORDER — DOCUSATE SODIUM 100 MG
100 CAPSULE ORAL
Qty: 0 | Refills: 0 | Status: DISCONTINUED | OUTPATIENT
Start: 2018-04-20 | End: 2018-04-22

## 2018-04-20 RX ORDER — METRONIDAZOLE 500 MG
500 TABLET ORAL EVERY 8 HOURS
Qty: 0 | Refills: 0 | Status: DISCONTINUED | OUTPATIENT
Start: 2018-04-20 | End: 2018-04-22

## 2018-04-20 RX ADMIN — HYDROMORPHONE HYDROCHLORIDE 0.5 MILLIGRAM(S): 2 INJECTION INTRAMUSCULAR; INTRAVENOUS; SUBCUTANEOUS at 22:15

## 2018-04-20 RX ADMIN — HYDROMORPHONE HYDROCHLORIDE 1 MILLIGRAM(S): 2 INJECTION INTRAMUSCULAR; INTRAVENOUS; SUBCUTANEOUS at 06:30

## 2018-04-20 RX ADMIN — Medication 100 MILLIGRAM(S): at 06:06

## 2018-04-20 RX ADMIN — Medication 100 MILLIGRAM(S): at 13:35

## 2018-04-20 RX ADMIN — HYDROMORPHONE HYDROCHLORIDE 1 MILLIGRAM(S): 2 INJECTION INTRAMUSCULAR; INTRAVENOUS; SUBCUTANEOUS at 01:26

## 2018-04-20 RX ADMIN — HYDROMORPHONE HYDROCHLORIDE 1 MILLIGRAM(S): 2 INJECTION INTRAMUSCULAR; INTRAVENOUS; SUBCUTANEOUS at 01:45

## 2018-04-20 RX ADMIN — SENNA PLUS 2 TABLET(S): 8.6 TABLET ORAL at 22:13

## 2018-04-20 RX ADMIN — HEPARIN SODIUM 5000 UNIT(S): 5000 INJECTION INTRAVENOUS; SUBCUTANEOUS at 06:07

## 2018-04-20 RX ADMIN — HEPARIN SODIUM 5000 UNIT(S): 5000 INJECTION INTRAVENOUS; SUBCUTANEOUS at 22:13

## 2018-04-20 RX ADMIN — SIMETHICONE 80 MILLIGRAM(S): 80 TABLET, CHEWABLE ORAL at 16:56

## 2018-04-20 RX ADMIN — HYDROMORPHONE HYDROCHLORIDE 0.5 MILLIGRAM(S): 2 INJECTION INTRAMUSCULAR; INTRAVENOUS; SUBCUTANEOUS at 22:30

## 2018-04-20 RX ADMIN — Medication 40 MILLIEQUIVALENT(S): at 13:34

## 2018-04-20 RX ADMIN — HYDROMORPHONE HYDROCHLORIDE 1 MILLIGRAM(S): 2 INJECTION INTRAMUSCULAR; INTRAVENOUS; SUBCUTANEOUS at 10:36

## 2018-04-20 RX ADMIN — HYDROMORPHONE HYDROCHLORIDE 1 MILLIGRAM(S): 2 INJECTION INTRAMUSCULAR; INTRAVENOUS; SUBCUTANEOUS at 15:20

## 2018-04-20 RX ADMIN — HYDROMORPHONE HYDROCHLORIDE 2 MILLIGRAM(S): 2 INJECTION INTRAMUSCULAR; INTRAVENOUS; SUBCUTANEOUS at 21:00

## 2018-04-20 RX ADMIN — HEPARIN SODIUM 5000 UNIT(S): 5000 INJECTION INTRAVENOUS; SUBCUTANEOUS at 13:34

## 2018-04-20 RX ADMIN — HYDROMORPHONE HYDROCHLORIDE 1 MILLIGRAM(S): 2 INJECTION INTRAMUSCULAR; INTRAVENOUS; SUBCUTANEOUS at 14:52

## 2018-04-20 RX ADMIN — Medication 50 MILLIGRAM(S): at 10:42

## 2018-04-20 RX ADMIN — Medication 100 MILLIGRAM(S): at 16:56

## 2018-04-20 RX ADMIN — HYDROMORPHONE HYDROCHLORIDE 1 MILLIGRAM(S): 2 INJECTION INTRAMUSCULAR; INTRAVENOUS; SUBCUTANEOUS at 06:11

## 2018-04-20 RX ADMIN — HYDROMORPHONE HYDROCHLORIDE 1 MILLIGRAM(S): 2 INJECTION INTRAMUSCULAR; INTRAVENOUS; SUBCUTANEOUS at 10:55

## 2018-04-20 RX ADMIN — Medication 250 MILLIGRAM(S): at 10:36

## 2018-04-20 RX ADMIN — Medication 500 MILLIGRAM(S): at 22:13

## 2018-04-20 RX ADMIN — Medication 100 MILLIGRAM(S): at 00:08

## 2018-04-20 RX ADMIN — ONDANSETRON 4 MILLIGRAM(S): 8 TABLET, FILM COATED ORAL at 10:36

## 2018-04-20 RX ADMIN — HYDROMORPHONE HYDROCHLORIDE 2 MILLIGRAM(S): 2 INJECTION INTRAMUSCULAR; INTRAVENOUS; SUBCUTANEOUS at 20:04

## 2018-04-20 RX ADMIN — Medication 2 MILLIGRAM(S): at 06:35

## 2018-04-20 RX ADMIN — FLUCONAZOLE 150 MILLIGRAM(S): 150 TABLET ORAL at 22:13

## 2018-04-20 RX ADMIN — Medication 100 MILLIGRAM(S): at 07:05

## 2018-04-20 NOTE — PROGRESS NOTE ADULT - ATTENDING COMMENTS
I have reviewed the medical record, including laboratory and radiographic studies, interviewed and examined the patient and discussed the plan with Dr. Mai, the ID Resident.  Agree with above. One of her cultures from the OR on 4/17 is currently growing a staph species that has not yet been identified. Her vancomycin trough on 4/18 was therapeutic. In addition, she is having vaginal discharge, burning and irritation. She thinks she has a yeast infection and has been on broad spectrum antibiotics for some time. Can treat empirically with one dose of fluconazole 150 mg p.o. for now.  Recommendations discussed with the primary team. Will continue to follow with you – ID service.
I have reviewed the medical record, including laboratory and radiographic studies, interviewed and examined the patient and discussed the plan with Dr. Mai, the ID Resident.  Agree with above.  Her OR cultures from 3/14 did not grow and she only received clinda "prophylaxis" for a few days at that time.  After she was off antibiotics for an extended period, culture of collection from OR on 4/17 has grown only coagulase negative staph to date.  Culture is being held for anaerobes.  Antibiotic recommendations as above.  She cannot receive trazodone if on linezolid.  Will continue to follow with you – ID service.

## 2018-04-20 NOTE — PROGRESS NOTE ADULT - SUBJECTIVE AND OBJECTIVE BOX
O/N: Fluconazole 150mg 1x for yeast infection as per ID, vanco trough is off do not following 4/19 trough level, next trough to be drawn at 5am on 4/20 4/19 : Patient  refused  dressing change , psych contacted , plastic consulted on call refused because does not accept patients insurance O/N: Fluconazole 150mg 1x for yeast infection as per ID, vanco trough is off do not following 4/19 trough level, next trough to be drawn at 5am on 4/20 4/19 : Patient  refused  dressing change , psych contacted , plastic consulted on call refused because does not accept patients insurance     INTERVAL HPI/OVERNIGHT EVENTS:    STATUS POST:  I&D    POST OPERATIVE DAY #: 3     SUBJECTIVE: Pt seen and examined at bedside. No acute complaints. Denies giving permission to speak with psych. Dressing/ packing changed  Flatus: [x ] YES [ ] NO             Bowel Movement: [x ] YES [ ] NO  Pain (0-10):            Pain Control Adequate: [x ] YES [ ] NO  Nausea: [ ] YES [ x] NO            Vomiting: [ ] YES [x ] NO  Diarrhea: [ ] YES [x ] NO         Constipation: [ ] YES x[ ] NO     Chest Pain: [ ] YES [ x] NO    SOB:  [ ] YES [x ] NO    MEDICATIONS  (STANDING):  dextrose 5%. 1000 milliLiter(s) (50 mL/Hr) IV Continuous <Continuous>  dextrose 50% Injectable 12.5 Gram(s) IV Push once  dextrose 50% Injectable 25 Gram(s) IV Push once  dextrose 50% Injectable 25 Gram(s) IV Push once  docusate sodium 100 milliGRAM(s) Oral two times a day  fluconAZOLE   Tablet 150 milliGRAM(s) Oral daily  heparin  Injectable 5000 Unit(s) SubCutaneous every 8 hours  insulin lispro (HumaLOG) corrective regimen sliding scale   SubCutaneous Before meals and at bedtime  linezolid    Tablet 600 milliGRAM(s) Oral every 12 hours  LORazepam     Tablet 1 milliGRAM(s) Oral once  metoprolol tartrate Injectable 5 milliGRAM(s) IV Push once  metroNIDAZOLE    Tablet 500 milliGRAM(s) Oral every 8 hours  senna 2 Tablet(s) Oral at bedtime    MEDICATIONS  (PRN):  dextrose Gel 1 Dose(s) Oral once PRN Blood Glucose LESS THAN 70 milliGRAM(s)/deciliter  glucagon  Injectable 1 milliGRAM(s) IntraMuscular once PRN Glucose LESS THAN 70 milligrams/deciliter  HYDROmorphone   Tablet 2 milliGRAM(s) Oral every 4 hours PRN Moderate Pain (4 - 6)  HYDROmorphone  Injectable 0.5 milliGRAM(s) IV Push every 4 hours PRN severe breakthrough pain  ondansetron Injectable 4 milliGRAM(s) IV Push every 6 hours PRN Nausea and/or Vomiting      Vital Signs Last 24 Hrs  T(C): 36.8 (20 Apr 2018 16:54), Max: 37.4 (19 Apr 2018 21:20)  T(F): 98.3 (20 Apr 2018 16:54), Max: 99.3 (19 Apr 2018 21:20)  HR: 68 (20 Apr 2018 16:54) (68 - 91)  BP: 142/88 (20 Apr 2018 16:54) (134/86 - 149/80)  BP(mean): --  RR: 17 (20 Apr 2018 16:54) (16 - 17)  SpO2: 100% (20 Apr 2018 16:54) (96% - 100%)    PHYSICAL EXAM:      Constitutional: A&Ox3    Respiratory: non labored breathing, no respiratory distress    Cardiovascular: RRR    Gastrointestinal: soft mild tenderness to lower abdomen                 Incision: packed with 2" tape     Genitourinary: voiding    Extremities: (-) edema                  I&O's Detail    19 Apr 2018 07:01  -  20 Apr 2018 07:00  --------------------------------------------------------  IN:    Oral Fluid: 800 mL    Solution: 200 mL    Solution: 200 mL  Total IN: 1200 mL    OUT:    Voided: 1500 mL  Total OUT: 1500 mL    Total NET: -300 mL          LABS:                        12.1   6.5   )-----------( 304      ( 20 Apr 2018 08:32 )             39.2     04-20    135  |  97  |  7   ----------------------------<  125<H>  3.6   |  26  |  0.72    Ca    9.1      20 Apr 2018 08:32  Phos  3.4     04-20  Mg     1.9     04-20            RADIOLOGY & ADDITIONAL STUDIES:

## 2018-04-20 NOTE — CONSULT NOTE ADULT - SUBJECTIVE AND OBJECTIVE BOX
HPI: 37 yo obese F with PMHx of HTN, DM, HLD, prior VHR with synthetic mesh in 1/2018 c/b wound infection requiring IR drainage 2/18, recent abdominal fistulectomy, explantation of infected synthetic mesh, component separation with biologic mesh and VAC in 3/18 presented with abdominal wound breakdown and purulent drainage. On 4/17/18 Pt underwent OR washout and drainage of abdominal seroma.  Abdominal wound was left open and patient is currently undergoing daily packing changes.  OR cx (4/17/18) + coag neg staph, however OR cx from previous admission and OR washout demonstrated polymicrobial infection (cx with E. faecalis, Actinomyces, Bacteroides, Prevotella, Peptostreptococcus, and Bacillus). ID service has been consulted and patient is currently on Aztreonam, Flagyl and Vanco as per ID recs.    Patient denies fevers/chills, SOB, CP, no more purulent drainage from the wound. C/o incisional pain and tenderness. Reports normal bowel function.    PMHx: as above  PSHx: as above  ALL: amoxicillin, PCN, iodine, shellfish  Meds:  Soc Hx: former smoker  Fam Hx: non-contributory    PE;    Awake/Alert  No resp distres  Abd obese, + infraumbilical pannus, soft, + incisional tenderness, no erythema, no fluctuance/collection appreciated, open abd wound along left aspect of pfannenstiel incision with clean wound base, + granulation tissue, no purulence HPI: 37 yo obese F with PMHx of HTN, DM, HLD, prior VHR with synthetic mesh in 2017 c/b wound infection requiring IR drainage , recent abdominal fistulectomy, explantation of infected synthetic mesh, component separation with biologic mesh and VAC in 3/18 presented with abdominal wound breakdown and purulent drainage. On 18 Pt underwent OR washout and drainage of abdominal seroma.  Abdominal wound was left open and patient is currently undergoing daily packing changes.  OR cx (18) + coag neg staph, however OR cx from previous admission and OR washout demonstrated polymicrobial infection (cx with E. faecalis, Actinomyces, Bacteroides, Prevotella, Peptostreptococcus, and Bacillus). ID service has been consulted and patient is currently on Aztreonam, Flagyl and Vanco as per ID recs.    Patient denies fevers/chills, SOB, CP, no more purulent drainage from the wound. C/o incisional pain and tenderness. Reports normal bowel function.    PMHx: as above  PSHx: as above, , D&C, LEEP  ALL: amoxicillin, PCN, iodine, shellfish  Meds: Aztreonam, Vanco, Flagyl  Soc Hx: former smoker  Fam Hx: non-contributory    PE:    Awake/Alert  No resp distres  Abd obese, + infraumbilical pannus, soft, + incisional tenderness, no erythema, no fluctuance/collection appreciated, open abd wound along left aspect of pfannenstiel incision with clean wound base, + granulation tissue, no purulence

## 2018-04-20 NOTE — PROGRESS NOTE ADULT - ASSESSMENT
38 year old female w/ pmhx of DM, ventral hernia repair 1/2017, complicated by polymicrobial wound infection w/ abdominal wall collection associated by hernia mesh (cx with E. faecalis, Actinomyces, Bacteroides, Prevotella, Peptostreptococcus, and Bacillus) s/p course of ertapenem.  She subsequently underwent fistulectomy, drainage of abscess, removal of infected mesh and placement of biologic mesh on 3/14 (OR cx negative). Now she again presents with pelvic pain and malodorous drainage from abdominal wound s/p I&D 4/17. Vancomycin trough resulted as 28 the evening of 4/19 inaccurate as that was drawn during Vancomycin infusion.  - PCN allergy skin testing if able   - continue current management with vancomycin, aztreonam, metronidazole   - vancomycin trough before every 4th dose     Discussed with primary team and ID attending   ID will follow 38 year old female w/ pmhx of DM, ventral hernia repair 1/2017, complicated by polymicrobial wound infection w/ abdominal wall collection associated by hernia mesh (cx with E. faecalis, Actinomyces, Bacteroides, Prevotella, Peptostreptococcus, and Bacillus) s/p course of ertapenem.  She subsequently underwent fistulectomy, drainage of abscess, removal of infected mesh and placement of biologic mesh on 3/14 (OR cx negative). Now she again presents with pelvic pain and malodorous drainage from abdominal wound s/p I&D 4/17. Vancomycin trough resulted as 28 the evening of 4/19 inaccurate as that was drawn during Vancomycin infusion.    Recommendations:  1. Discontinue Vancomycin and Aztreonam  2. START Linezolid 600mg PO q12h   3. Transition Metronidazole from IV to PO   4. Give Fluconazole 150mg x 2 more days     Discussed with primary team and ID attending   ID will follow 38 year old female w/ pmhx of DM, ventral hernia repair 1/2017, complicated by polymicrobial wound infection w/ abdominal wall collection associated by hernia mesh (cx with E. faecalis, Actinomyces, Bacteroides, Prevotella, Peptostreptococcus, and Bacillus) s/p course of ertapenem.  She subsequently underwent fistulectomy, drainage of abscess, removal of infected mesh and placement of biologic mesh on 3/14 (OR cx negative). Now she again presents with pelvic pain and malodorous drainage from abdominal wound s/p I&D 4/17. Vancomycin trough resulted as 28 the evening of 4/19 inaccurate as that was drawn during Vancomycin infusion.  She reports some improvement in vaginal discharge, swelling and irritation.    Recommendations:  1. Discontinue Vancomycin and Aztreonam  2. START Linezolid 600mg PO q12h   3. Transition Metronidazole from IV to PO   4. Give Fluconazole 150mg x 2 more days    Discussed with primary team and ID attending   ID will follow

## 2018-04-20 NOTE — PROGRESS NOTE ADULT - SUBJECTIVE AND OBJECTIVE BOX
INTERVAL HPI/OVERNIGHT EVENTS:    No overnight events. Patient afebrile, 97.6, without leukocytosis, WBC 8.4     ANTIBIOTICS/RELEVANT:    MEDICATIONS  (STANDING):  aztreonam  IVPB 2000 milliGRAM(s) IV Intermittent every 8 hours  dextrose 5%. 1000 milliLiter(s) (50 mL/Hr) IV Continuous <Continuous>  dextrose 50% Injectable 12.5 Gram(s) IV Push once  dextrose 50% Injectable 25 Gram(s) IV Push once  dextrose 50% Injectable 25 Gram(s) IV Push once  docusate sodium 100 milliGRAM(s) Oral two times a day  heparin  Injectable 5000 Unit(s) SubCutaneous every 8 hours  insulin lispro (HumaLOG) corrective regimen sliding scale   SubCutaneous Before meals and at bedtime  LORazepam     Tablet 1 milliGRAM(s) Oral once  LORazepam   Injectable 1 milliGRAM(s) IntraMuscular once  metoprolol tartrate Injectable 5 milliGRAM(s) IV Push once  metroNIDAZOLE  IVPB 500 milliGRAM(s) IV Intermittent every 8 hours  senna 2 Tablet(s) Oral at bedtime  simethicone 80 milliGRAM(s) Chew once  traZODone 25 milliGRAM(s) Oral at bedtime  vancomycin  IVPB 1500 milliGRAM(s) IV Intermittent every 12 hours    MEDICATIONS  (PRN):  dextrose Gel 1 Dose(s) Oral once PRN Blood Glucose LESS THAN 70 milliGRAM(s)/deciliter  diphenhydrAMINE   Capsule 50 milliGRAM(s) Oral every 6 hours PRN Rash and/or Itching  glucagon  Injectable 1 milliGRAM(s) IntraMuscular once PRN Glucose LESS THAN 70 milligrams/deciliter  HYDROmorphone   Tablet 2 milliGRAM(s) Oral every 4 hours PRN Moderate Pain (4 - 6)  HYDROmorphone  Injectable 0.5 milliGRAM(s) IV Push every 4 hours PRN severe breakthrough pain  ondansetron Injectable 4 milliGRAM(s) IV Push every 6 hours PRN Nausea and/or Vomiting        Vital Signs Last 24 Hrs  T(C): 36.4 (20 Apr 2018 05:43), Max: 37.4 (19 Apr 2018 21:20)  T(F): 97.6 (20 Apr 2018 05:43), Max: 99.3 (19 Apr 2018 21:20)  HR: 78 (20 Apr 2018 05:43) (78 - 91)  BP: 134/86 (20 Apr 2018 05:43) (134/86 - 149/80)  BP(mean): --  RR: 16 (20 Apr 2018 05:43) (16 - 16)  SpO2: 96% (20 Apr 2018 05:43) (96% - 96%)    04-19-18 @ 07:01  -  04-20-18 @ 07:00  --------------------------------------------------------  IN: 1200 mL / OUT: 1500 mL / NET: -300 mL      PHYSICAL EXAM:  Constitutional:Well-developed, well nourished  Eyes:NAOMI, EOMI  Ear/Nose/Throat: no oral lesion, no sinus tenderness on percussion	  Neck:no JVD, no lymphadenopathy, supple  Respiratory: CTA cristi  Cardiovascular: S1S2 RRR, no murmurs  Gastrointestinal:soft, (+) BS, no HSM; surgical wound with packing; surrounding skin clean, minimal erythema   Extremities:no e/e/c  Vascular: DP Pulse:	right normal; left normal  Skin: no rash         LABS:                        12.1   6.5   )-----------( 304      ( 20 Apr 2018 08:32 )             39.2     04-20    135  |  97  |  7   ----------------------------<  125<H>  3.6   |  26  |  0.72    Ca    9.1      20 Apr 2018 08:32  Phos  3.4     04-20  Mg     1.9     04-20            MICROBIOLOGY:  Culture - Fungal, Other (04.18.18 @ 00:47)    Specimen Source: .Other Abdominal Wound - OR    Culture Results:   Testing in progress    Culture - Acid Fast - Other w/Smear (04.18.18 @ 00:47)    Specimen Source: .Other Abdominal Wound - OR    Acid Fast Bacilli Smear:   No acid fast bacilli seen by fluorochrome stain    Culture - Surgical Swab (04.17.18 @ 16:21)    Gram Stain:   No organisms seen  Few WBC's    Specimen Source: .Surgical Swab Abdominal Wound #2 - OR    Culture Results:   No growth to date      RADIOLOGY & ADDITIONAL STUDIES:

## 2018-04-20 NOTE — PROGRESS NOTE ADULT - SUBJECTIVE AND OBJECTIVE BOX
Pain Management Progress Note - [facility] [phone]    HPI: Patient seen at 09:30am. Pt sitting up in bed. Patient feels pain is well controlled under current regimen. Patient is dissatisfied with current benadryl dosage in hospital. Patient states that Primary Care Doctor has her on a higher and more frequent dose of Benadryl at home for total body rashes. Patient also dissatisfied with trazadone .             Pertinent PMH: Pain at: ___Back ___Neck___Knee ___Hip ___Shoulder ___ Opioid tolerance __x__ abdomen    Pain is ___ sharp __x__dull _x__burning _x__achy ___ Intensity: ____ mild __x__mod __x__severe     Location ___x__surgical site _____cervical _____lumbar _x___abd _____upper ext____lower ext    Worse with __x__activity _x___movement _____physical therapy___ Rest    Improved with __x__medication _x___rest ____physical therapy    morphine  - Injectable  hydrocortisone sodium succinate Injectable  diphenhydrAMINE   Injectable  morphine  - Injectable  vancomycin  IVPB  aztreonam  IVPB  oxyCODONE    5 mG/acetaminophen 325 mG  vancomycin  IVPB  aztreonam  IVPB  insulin lispro (HumaLOG) corrective regimen sliding scale  dextrose 5%.  dextrose Gel  dextrose 50% Injectable  glucagon  Injectable  aztreonam  IVPB  HYDROmorphone  Injectable  diphenhydrAMINE   Capsule  heparin  Injectable  LORazepam   Injectable  HYDROmorphone  Injectable  diphenhydrAMINE   Injectable  HYDROmorphone  Injectable  oxyCODONE    IR  oxyCODONE    5 mG/acetaminophen 325 mG  diphenhydrAMINE   Injectable  metroNIDAZOLE  IVPB  HYDROmorphone  Injectable  diphenhydrAMINE   Injectable  LORazepam   Injectable  oxyCODONE    5 mG/acetaminophen 325 mG  LORazepam   Injectable  diphenhydrAMINE   Capsule  ondansetron Injectable  HYDROmorphone  Injectable  LORazepam   Injectable  fluconAZOLE   Tablet  LORazepam   Injectable  potassium chloride   Powder      ROS: Const:  _-__febrile   Eyes:_x__ENT:___CV: __-_chest pain  Resp: __-__sob  GI:__-_nausea __-_vomiting __x__abd pain ___npo ___clears ___full diet __bm  :___ Musk: __x_pain ___spasm  Skin:___ Neuro:  _-__cecfdswb__-_gxumnrzyo____ numbness ___weakness _-__paresthesia  Psych:__x_anxiety  Endo:___ Heme:___Allergy:___     @ 08:63446 mL/min/1.73M2    Hemoglobin: 12.1 g/dL ( @ 08:32)  Hemoglobin: 13.0 g/dL ( @ 08:19)    T(C): 36.4 (18 @ 05:43), Max: 37.4 (18 @ 21:20)  HR: 78 (18 @ 05:43) (78 - 95)  BP: 134/86 (18 @ 05:43) (134/86 - 149/80)  RR: 16 (18 @ 05:43) (16 - 17)  SpO2: 96% (18 @ 05:43) (96% - 100%)  Wt(kg): --     PHYSICAL EXAM:  Gen Appearance: __x_no acute distress _x__appropriate         Neuro: __x_SILT feet___x_ EOM Intact Psych: AAOX_3_, _x__mood/affect appropriate        Eyes: _x__conjunctiva WNL  _____ Pupils equal and round        ENT: _x__ears and nose atraumatic__x_ Hearing grossly intact        Neck: _x__trachea midline, no visible masses _x__thyroid without palpable mass    Resp: _x__Nml WOB____No tactile fremitus ___clear to auscultation    Cardio: _x__extremities free from edema ____pedal pulses palpable    GI/Abdomen: __x_soft _____ Nontender______Nondistended_____HSM    Lymphatic: ___no palpable nodes in neck  _x__no palpable nodes calves and feet    Skin/Wound: _x__Incision, _x__Dressing c/d/i,   __x__surrounding tissues soft,  ___drain/chest tube present____    Muscular: EHL __5_/5  Gastrocnemius__5_/5    _x__absent clubbing/cyanosis         ASSESSMENT:  This is a 38y old Female with a history of:  POSTOPERATIVE INTRAABDOMINAL ABCESS  No h/o HF  No pertinent family history in first degree relatives  Handoff  MEWS Score  Abdominal hernia  Hernia  Obesity  Diabetes  Hyperlipidemia  Essential hypertension  Pelvic fluid collection  Abdominal wall fluid collections  Pelvic fluid collection  Abdominal wall fluid collections  Postoperative intra-abdominal abscess  Adjustment disorder with disturbance of emotion  Incision and drainage  H/O ventral hernia repair  H/O:   History of D&C  H/O LEEP  WOUND CHECK  23        Recommended Treatment PLAN:  1. Continue Dilaudid 2mg PO Q4 prn for moderate pain  2. Continue 0.4mg IV q4 prn severe breakthrough pain  3. Continue Dilaudid 1mg PO QD prn for dressing change  Plan discussed with Dr. Montaño Pain Management Progress Note - [facility] [phone]    HPI: Patient seen at 09:30am. Pt sitting up in bed. Patient feels pain is well controlled under current regimen. Patient is dissatisfied with current benadryl dosage in hospital. Patient states  " my primary care doctor has her on a higher and more frequent dose of Benadryl at home for total body rashes". Patient also states that she is dissatisfied with trazadone. Patient also states she is dissatisfied with surgical tape used for dressing.       Pertinent PMH: Pain at: ___Back ___Neck___Knee ___Hip ___Shoulder _x__ Opioid tolerance __x__ abdomen    Pain is ___ sharp __x__dull _x__burning _x__achy ___ Intensity: ____ mild __x__mod __x__severe     Location ___x__surgical site _____cervical _____lumbar _x___abd _____upper ext____lower ext    Worse with __x__activity _x___movement _____physical therapy___ Rest    Improved with __x__medication _x___rest ____physical therapy    morphine  - Injectable  hydrocortisone sodium succinate Injectable  diphenhydrAMINE   Injectable  morphine  - Injectable  vancomycin  IVPB  aztreonam  IVPB  oxyCODONE    5 mG/acetaminophen 325 mG  vancomycin  IVPB  aztreonam  IVPB  insulin lispro (HumaLOG) corrective regimen sliding scale  dextrose 5%.  dextrose Gel  dextrose 50% Injectable  glucagon  Injectable  aztreonam  IVPB  HYDROmorphone  Injectable  diphenhydrAMINE   Capsule  heparin  Injectable  LORazepam   Injectable  HYDROmorphone  Injectable  diphenhydrAMINE   Injectable  HYDROmorphone  Injectable  oxyCODONE    IR  oxyCODONE    5 mG/acetaminophen 325 mG  diphenhydrAMINE   Injectable  metroNIDAZOLE  IVPB  HYDROmorphone  Injectable  diphenhydrAMINE   Injectable  LORazepam   Injectable  oxyCODONE    5 mG/acetaminophen 325 mG  LORazepam   Injectable  diphenhydrAMINE   Capsule  ondansetron Injectable  HYDROmorphone  Injectable  LORazepam   Injectable  fluconAZOLE   Tablet  LORazepam   Injectable  potassium chloride   Powder      ROS: Const:  _-__febrile   Eyes:_x__ENT:___CV: __-_chest pain  Resp: __-__sob  GI:__-_nausea __-_vomiting __x__abd pain ___npo ___clears ___full diet __bm  :___ Musk: __x_pain ___spasm  Skin:___ Neuro:  _-__nljkrkup__-_oomuhpocw____ numbness ___weakness _-__paresthesia  Psych:__x_anxiety  Endo:___ Heme:___Allergy:___  ____x___All other systems reviewed and negative     @ 08:29474 mL/min/1.73M2    Hemoglobin: 12.1 g/dL ( @ 08:32)  Hemoglobin: 13.0 g/dL ( @ 08:19)    T(C): 36.4 (18 @ 05:43), Max: 37.4 (18 @ 21:20)  HR: 78 (18 @ 05:43) (78 - 95)  BP: 134/86 (18 @ 05:43) (134/86 - 149/80)  RR: 16 (18 @ 05:43) (16 - 17)  SpO2: 96% (18 @ 05:43) (96% - 100%)  Wt(kg): --     PHYSICAL EXAM:  Gen Appearance: __x_no acute distress _x__appropriate         Neuro: __x_SILT feet___x_ EOM Intact Psych: AAOX_3_, _x__mood/affect appropriate        Eyes: _x__conjunctiva WNL  _____ Pupils equal and round        ENT: _x__ears and nose atraumatic__x_ Hearing grossly intact        Neck: _x__trachea midline, no visible masses _x__thyroid without palpable mass    Resp: _x__Nml WOB____No tactile fremitus ___clear to auscultation    Cardio: _x__extremities free from edema ____pedal pulses palpable    GI/Abdomen: __x_soft ___x__ Nontender___x___Nondistended_____HSM    Lymphatic: ___no palpable nodes in neck  _x__no palpable nodes calves and feet    Skin/Wound: _x__Incision, _x__Dressing c/d/i,   __x__surrounding tissues soft,  ___drain/chest tube present____    Muscular: EHL __5_/5  Gastrocnemius__5_/5    _x__absent clubbing/cyanosis         ASSESSMENT:  This is a 38y old Female with a history of:  POSTOPERATIVE INTRAABDOMINAL ABCESS  No h/o HF  No pertinent family history in first degree relatives  Handoff  MEWS Score  Abdominal hernia  Hernia  Obesity  Diabetes  Hyperlipidemia  Essential hypertension  Pelvic fluid collection  Abdominal wall fluid collections  Pelvic fluid collection  Abdominal wall fluid collections  Postoperative intra-abdominal abscess  Adjustment disorder with disturbance of emotion  Incision and drainage  H/O ventral hernia repair  H/O:   History of D&C  H/O LEEP  WOUND CHECK  23        Recommended Treatment PLAN:  1. Continue Dilaudid 2mg PO Q4 prn for moderate pain  2. Continue 0.4mg IV Q4 prn severe breakthrough pain  3. Continue Dilaudid 1mg PO QD prn for dressing change  Plan discussed with Dr. Montaño

## 2018-04-20 NOTE — CONSULT NOTE ADULT - ASSESSMENT
37 yo obese F with PMHx of HTN, DM, HLD, prior VHR with synthetic mesh in 1/2018 c/b wound infection requiring IR drainage 2/18, recent abdominal fistulectomy, explantation of infected synthetic mesh, component separation with biologic mesh and VAC in 3/18 presented with abdominal wound breakdown and purulent drainage now s/p OR washout and drainage of abdominal seroma (4/17/18)    - no acute plastic surgery intervention needed  - patient is a good candidate for elective panniculectomy  - for now continue daily local wound care/packing  - ABX as per ID  - diet as per primary team  - will follow with you  - discussed with Dr. Walker 39 yo obese F with PMHx of HTN, DM, HLD, prior VHR with synthetic mesh in 1/2017 c/b wound infection requiring IR drainage 2/17, recent abdominal fistulectomy, explantation of infected synthetic mesh, component separation with biologic mesh and VAC in 3/18 presented with abdominal wound breakdown and purulent drainage now s/p OR washout and drainage of abdominal seroma (4/17/18)    - no acute plastic surgery intervention needed  - patient is a good candidate for elective panniculectomy  - for now continue daily local wound care/packing  - ABX as per ID  - diet as per primary team  - will follow with you  - discussed with Dr. Walker

## 2018-04-21 LAB
ANION GAP SERPL CALC-SCNC: 12 MMOL/L — SIGNIFICANT CHANGE UP (ref 5–17)
BUN SERPL-MCNC: 5 MG/DL — LOW (ref 7–23)
CALCIUM SERPL-MCNC: 9.2 MG/DL — SIGNIFICANT CHANGE UP (ref 8.4–10.5)
CHLORIDE SERPL-SCNC: 100 MMOL/L — SIGNIFICANT CHANGE UP (ref 96–108)
CO2 SERPL-SCNC: 26 MMOL/L — SIGNIFICANT CHANGE UP (ref 22–31)
CREAT SERPL-MCNC: 0.68 MG/DL — SIGNIFICANT CHANGE UP (ref 0.5–1.3)
GLUCOSE BLDC GLUCOMTR-MCNC: 122 MG/DL — HIGH (ref 70–99)
GLUCOSE BLDC GLUCOMTR-MCNC: 127 MG/DL — HIGH (ref 70–99)
GLUCOSE BLDC GLUCOMTR-MCNC: 154 MG/DL — HIGH (ref 70–99)
GLUCOSE BLDC GLUCOMTR-MCNC: 155 MG/DL — HIGH (ref 70–99)
GLUCOSE SERPL-MCNC: 121 MG/DL — HIGH (ref 70–99)
HCT VFR BLD CALC: 40.6 % — SIGNIFICANT CHANGE UP (ref 34.5–45)
HGB BLD-MCNC: 13.1 G/DL — SIGNIFICANT CHANGE UP (ref 11.5–15.5)
MAGNESIUM SERPL-MCNC: 2.1 MG/DL — SIGNIFICANT CHANGE UP (ref 1.6–2.6)
MCHC RBC-ENTMCNC: 25.5 PG — LOW (ref 27–34)
MCHC RBC-ENTMCNC: 32.3 G/DL — SIGNIFICANT CHANGE UP (ref 32–36)
MCV RBC AUTO: 79.1 FL — LOW (ref 80–100)
PHOSPHATE SERPL-MCNC: 3.7 MG/DL — SIGNIFICANT CHANGE UP (ref 2.5–4.5)
PLATELET # BLD AUTO: 331 K/UL — SIGNIFICANT CHANGE UP (ref 150–400)
POTASSIUM SERPL-MCNC: 3.8 MMOL/L — SIGNIFICANT CHANGE UP (ref 3.5–5.3)
POTASSIUM SERPL-SCNC: 3.8 MMOL/L — SIGNIFICANT CHANGE UP (ref 3.5–5.3)
RBC # BLD: 5.13 M/UL — SIGNIFICANT CHANGE UP (ref 3.8–5.2)
RBC # FLD: 16.1 % — SIGNIFICANT CHANGE UP (ref 10.3–16.9)
SODIUM SERPL-SCNC: 138 MMOL/L — SIGNIFICANT CHANGE UP (ref 135–145)
WBC # BLD: 6.8 K/UL — SIGNIFICANT CHANGE UP (ref 3.8–10.5)
WBC # FLD AUTO: 6.8 K/UL — SIGNIFICANT CHANGE UP (ref 3.8–10.5)

## 2018-04-21 RX ADMIN — Medication 2: at 21:57

## 2018-04-21 RX ADMIN — HEPARIN SODIUM 5000 UNIT(S): 5000 INJECTION INTRAVENOUS; SUBCUTANEOUS at 21:46

## 2018-04-21 RX ADMIN — HEPARIN SODIUM 5000 UNIT(S): 5000 INJECTION INTRAVENOUS; SUBCUTANEOUS at 07:11

## 2018-04-21 RX ADMIN — Medication 500 MILLIGRAM(S): at 13:26

## 2018-04-21 RX ADMIN — HEPARIN SODIUM 5000 UNIT(S): 5000 INJECTION INTRAVENOUS; SUBCUTANEOUS at 13:26

## 2018-04-21 RX ADMIN — LINEZOLID 600 MILLIGRAM(S): 600 INJECTION, SOLUTION INTRAVENOUS at 23:47

## 2018-04-21 RX ADMIN — LINEZOLID 600 MILLIGRAM(S): 600 INJECTION, SOLUTION INTRAVENOUS at 10:57

## 2018-04-21 RX ADMIN — Medication 2: at 13:26

## 2018-04-21 RX ADMIN — HYDROMORPHONE HYDROCHLORIDE 0.5 MILLIGRAM(S): 2 INJECTION INTRAMUSCULAR; INTRAVENOUS; SUBCUTANEOUS at 11:15

## 2018-04-21 RX ADMIN — ONDANSETRON 4 MILLIGRAM(S): 8 TABLET, FILM COATED ORAL at 19:49

## 2018-04-21 RX ADMIN — LINEZOLID 600 MILLIGRAM(S): 600 INJECTION, SOLUTION INTRAVENOUS at 01:11

## 2018-04-21 RX ADMIN — HYDROMORPHONE HYDROCHLORIDE 0.5 MILLIGRAM(S): 2 INJECTION INTRAMUSCULAR; INTRAVENOUS; SUBCUTANEOUS at 20:48

## 2018-04-21 RX ADMIN — HYDROMORPHONE HYDROCHLORIDE 2 MILLIGRAM(S): 2 INJECTION INTRAMUSCULAR; INTRAVENOUS; SUBCUTANEOUS at 08:00

## 2018-04-21 RX ADMIN — HYDROMORPHONE HYDROCHLORIDE 2 MILLIGRAM(S): 2 INJECTION INTRAMUSCULAR; INTRAVENOUS; SUBCUTANEOUS at 07:11

## 2018-04-21 RX ADMIN — HYDROMORPHONE HYDROCHLORIDE 2 MILLIGRAM(S): 2 INJECTION INTRAMUSCULAR; INTRAVENOUS; SUBCUTANEOUS at 19:49

## 2018-04-21 RX ADMIN — Medication 100 MILLIGRAM(S): at 17:19

## 2018-04-21 RX ADMIN — FLUCONAZOLE 150 MILLIGRAM(S): 150 TABLET ORAL at 10:57

## 2018-04-21 RX ADMIN — ONDANSETRON 4 MILLIGRAM(S): 8 TABLET, FILM COATED ORAL at 13:26

## 2018-04-21 RX ADMIN — HYDROMORPHONE HYDROCHLORIDE 2 MILLIGRAM(S): 2 INJECTION INTRAMUSCULAR; INTRAVENOUS; SUBCUTANEOUS at 01:11

## 2018-04-21 RX ADMIN — Medication 500 MILLIGRAM(S): at 07:11

## 2018-04-21 RX ADMIN — HYDROMORPHONE HYDROCHLORIDE 2 MILLIGRAM(S): 2 INJECTION INTRAMUSCULAR; INTRAVENOUS; SUBCUTANEOUS at 02:00

## 2018-04-21 RX ADMIN — HYDROMORPHONE HYDROCHLORIDE 0.5 MILLIGRAM(S): 2 INJECTION INTRAMUSCULAR; INTRAVENOUS; SUBCUTANEOUS at 10:57

## 2018-04-21 RX ADMIN — HYDROMORPHONE HYDROCHLORIDE 2 MILLIGRAM(S): 2 INJECTION INTRAMUSCULAR; INTRAVENOUS; SUBCUTANEOUS at 13:00

## 2018-04-21 RX ADMIN — Medication 500 MILLIGRAM(S): at 21:46

## 2018-04-21 RX ADMIN — HYDROMORPHONE HYDROCHLORIDE 2 MILLIGRAM(S): 2 INJECTION INTRAMUSCULAR; INTRAVENOUS; SUBCUTANEOUS at 20:29

## 2018-04-21 RX ADMIN — HYDROMORPHONE HYDROCHLORIDE 2 MILLIGRAM(S): 2 INJECTION INTRAMUSCULAR; INTRAVENOUS; SUBCUTANEOUS at 16:05

## 2018-04-21 RX ADMIN — HYDROMORPHONE HYDROCHLORIDE 2 MILLIGRAM(S): 2 INJECTION INTRAMUSCULAR; INTRAVENOUS; SUBCUTANEOUS at 23:47

## 2018-04-21 RX ADMIN — HYDROMORPHONE HYDROCHLORIDE 2 MILLIGRAM(S): 2 INJECTION INTRAMUSCULAR; INTRAVENOUS; SUBCUTANEOUS at 17:00

## 2018-04-21 RX ADMIN — HYDROMORPHONE HYDROCHLORIDE 2 MILLIGRAM(S): 2 INJECTION INTRAMUSCULAR; INTRAVENOUS; SUBCUTANEOUS at 12:20

## 2018-04-21 RX ADMIN — HYDROMORPHONE HYDROCHLORIDE 0.5 MILLIGRAM(S): 2 INJECTION INTRAMUSCULAR; INTRAVENOUS; SUBCUTANEOUS at 21:15

## 2018-04-21 RX ADMIN — Medication 100 MILLIGRAM(S): at 07:11

## 2018-04-21 RX ADMIN — SENNA PLUS 2 TABLET(S): 8.6 TABLET ORAL at 21:46

## 2018-04-21 NOTE — PROGRESS NOTE ADULT - SUBJECTIVE AND OBJECTIVE BOX
O/N: FAUSTINO  4/20: increased adb distention, bowel regiment added, ID recs linezolid flagyl. Recs fluconazole x2 d for yeast infection. Trazadone not added 2/2 interaction with linezolid O/N: FAUSTINO  4/20: increased adb distention, bowel regiment added, ID recs linezolid flagyl. Recs fluconazole x2 d for yeast infection. Trazadone not added 2/2 interaction with linezolid        SUBJECTIVE: Patient seen and examined bedside by chief resident. No nausea/vomiting, no abd pain, no fever    fluconAZOLE   Tablet 150 milliGRAM(s) Oral daily  heparin  Injectable 5000 Unit(s) SubCutaneous every 8 hours  linezolid    Tablet 600 milliGRAM(s) Oral every 12 hours  metoprolol tartrate Injectable 5 milliGRAM(s) IV Push once  metroNIDAZOLE    Tablet 500 milliGRAM(s) Oral every 8 hours      Vital Signs Last 24 Hrs  T(C): 36.6 (21 Apr 2018 05:46), Max: 37 (20 Apr 2018 20:45)  T(F): 97.9 (21 Apr 2018 05:46), Max: 98.6 (20 Apr 2018 20:45)  HR: 76 (21 Apr 2018 05:46) (68 - 88)  BP: 136/82 (21 Apr 2018 05:46) (136/82 - 142/88)  BP(mean): --  RR: 17 (21 Apr 2018 05:46) (16 - 17)  SpO2: 98% (21 Apr 2018 05:46) (98% - 100%)  I&O's Detail    20 Apr 2018 07:01  -  21 Apr 2018 07:00  --------------------------------------------------------  IN:    Oral Fluid: 800 mL  Total IN: 800 mL    OUT:    Voided: 700 mL  Total OUT: 700 mL    Total NET: 100 mL      21 Apr 2018 07:01  -  21 Apr 2018 10:33  --------------------------------------------------------  IN:    Oral Fluid: 480 mL  Total IN: 480 mL    OUT:    Voided: 200 mL  Total OUT: 200 mL    Total NET: 280 mL          General: NAD, resting comfortably in bed  C/V: NSR  Pulm: Nonlabored breathing, no respiratory distress  Abd: soft, NT/ND. wound is packed with dressing  Extrem: WWP, no edema, SCDs in place        LABS:                        13.1   6.8   )-----------( 331      ( 21 Apr 2018 08:55 )             40.6     04-21    138  |  100  |  5<L>  ----------------------------<  121<H>  3.8   |  26  |  0.68    Ca    9.2      21 Apr 2018 08:55  Phos  3.7     04-21  Mg     2.1     04-21            RADIOLOGY & ADDITIONAL STUDIES:

## 2018-04-21 NOTE — PROGRESS NOTE ADULT - ASSESSMENT
37 yo F h/o HTN, DM, HLD, MO, prior ventral hernia repair with wound infection    -vanco/aztreonam/flagyl  -f/u ID re: outpatient recs  -regular diet  -ISS  -pain/nausea control  -HSQ/SCDs  -wet to dry dressing to wound  -f/u micro 39 yo F h/o HTN, DM, HLD, MO, prior ventral hernia repair with wound infection    -cont linezolid, fluco and micafungin  -cont plastic recs   -regular diet  -ISS  -pain/nausea control  -HSQ/SCDs  -wet to dry dressing to wound

## 2018-04-21 NOTE — PROGRESS NOTE ADULT - SUBJECTIVE AND OBJECTIVE BOX
SUBJECTIVE:  Doing well.   No overnight events.  ABX switched to PO regimen as per ID recs.     OBJECTIVE:     ** VITAL SIGNS / I&O's **    Vital Signs Last 24 Hrs  T(C): 36.6 (21 Apr 2018 05:46), Max: 37 (20 Apr 2018 20:45)  T(F): 97.9 (21 Apr 2018 05:46), Max: 98.6 (20 Apr 2018 20:45)  HR: 76 (21 Apr 2018 05:46) (68 - 88)  BP: 136/82 (21 Apr 2018 05:46) (136/82 - 142/88)  BP(mean): --  RR: 17 (21 Apr 2018 05:46) (16 - 17)  SpO2: 98% (21 Apr 2018 05:46) (98% - 100%)      20 Apr 2018 07:01  -  21 Apr 2018 07:00  --------------------------------------------------------  IN:    Oral Fluid: 800 mL  Total IN: 800 mL    OUT:    Voided: 700 mL  Total OUT: 700 mL    Total NET: 100 mL          ** PHYSICAL EXAM **    -- CONSTITUTIONAL: AOx3. NAD.   -- No resp distress  -- ABDOMEN: soft, ND, min tender, no collection appreciated, dressing intact/not saturated      ** LABS **                          13.1   6.8   )-----------( 331      ( 21 Apr 2018 08:55 )             40.6     21 Apr 2018 08:55    138    |  100    |  5      ----------------------------<  121    3.8     |  26     |  0.68     Ca    9.2        21 Apr 2018 08:55  Phos  3.7       21 Apr 2018 08:55  Mg     2.1       21 Apr 2018 08:55        CAPILLARY BLOOD GLUCOSE      POCT Blood Glucose.: 122 mg/dL (21 Apr 2018 07:26)  POCT Blood Glucose.: 129 mg/dL (20 Apr 2018 21:09)  POCT Blood Glucose.: 106 mg/dL (20 Apr 2018 17:03)  POCT Blood Glucose.: 159 mg/dL (20 Apr 2018 11:56)      A/P: 39 yo obese F with PMHx of HTN, DM, HLD, prior VHR with synthetic mesh in 1/2017 c/b wound infection requiring IR drainage 2/17, recent abdominal fistulectomy, explantation of infected synthetic mesh, component separation with biologic mesh and VAC in 3/18 presented with abdominal wound breakdown and purulent drainage s/p OR washout and drainage of abdominal seroma (4/17/18)    - cont local wound care  - ABX as per ID  - Pain control  - Ambulation  - DVT prophylaxis   - Plan for elective panniculectomy upon discharge

## 2018-04-22 ENCOUNTER — TRANSCRIPTION ENCOUNTER (OUTPATIENT)
Age: 38
End: 2018-04-22

## 2018-04-22 VITALS
RESPIRATION RATE: 17 BRPM | SYSTOLIC BLOOD PRESSURE: 156 MMHG | HEART RATE: 95 BPM | TEMPERATURE: 98 F | DIASTOLIC BLOOD PRESSURE: 90 MMHG

## 2018-04-22 LAB
GLUCOSE BLDC GLUCOMTR-MCNC: 113 MG/DL — HIGH (ref 70–99)
GLUCOSE BLDC GLUCOMTR-MCNC: 135 MG/DL — HIGH (ref 70–99)
GLUCOSE BLDC GLUCOMTR-MCNC: 172 MG/DL — HIGH (ref 70–99)

## 2018-04-22 PROCEDURE — 74176 CT ABD & PELVIS W/O CONTRAST: CPT | Mod: 26

## 2018-04-22 PROCEDURE — 99233 SBSQ HOSP IP/OBS HIGH 50: CPT

## 2018-04-22 RX ORDER — DIPHENHYDRAMINE HCL 50 MG
25 CAPSULE ORAL ONCE
Qty: 0 | Refills: 0 | Status: COMPLETED | OUTPATIENT
Start: 2018-04-22 | End: 2018-04-22

## 2018-04-22 RX ORDER — METRONIDAZOLE 500 MG
1 TABLET ORAL
Qty: 24 | Refills: 0 | OUTPATIENT
Start: 2018-04-22 | End: 2018-04-29

## 2018-04-22 RX ORDER — HYDROMORPHONE HYDROCHLORIDE 2 MG/ML
2 INJECTION INTRAMUSCULAR; INTRAVENOUS; SUBCUTANEOUS ONCE
Qty: 0 | Refills: 0 | Status: DISCONTINUED | OUTPATIENT
Start: 2018-04-22 | End: 2018-04-22

## 2018-04-22 RX ORDER — HYDROMORPHONE HYDROCHLORIDE 2 MG/ML
4 INJECTION INTRAMUSCULAR; INTRAVENOUS; SUBCUTANEOUS ONCE
Qty: 0 | Refills: 0 | Status: DISCONTINUED | OUTPATIENT
Start: 2018-04-22 | End: 2018-04-22

## 2018-04-22 RX ORDER — HYDROMORPHONE HYDROCHLORIDE 2 MG/ML
1 INJECTION INTRAMUSCULAR; INTRAVENOUS; SUBCUTANEOUS
Qty: 24 | Refills: 0 | OUTPATIENT
Start: 2018-04-22 | End: 2018-04-25

## 2018-04-22 RX ORDER — LINEZOLID 600 MG/300ML
1 INJECTION, SOLUTION INTRAVENOUS
Qty: 42 | Refills: 0 | OUTPATIENT
Start: 2018-04-22 | End: 2018-05-12

## 2018-04-22 RX ORDER — DOCUSATE SODIUM 100 MG
1 CAPSULE ORAL
Qty: 30 | Refills: 0 | OUTPATIENT
Start: 2018-04-22 | End: 2018-05-21

## 2018-04-22 RX ADMIN — Medication 5 MILLIGRAM(S): at 10:21

## 2018-04-22 RX ADMIN — Medication 100 MILLIGRAM(S): at 19:56

## 2018-04-22 RX ADMIN — HYDROMORPHONE HYDROCHLORIDE 2 MILLIGRAM(S): 2 INJECTION INTRAMUSCULAR; INTRAVENOUS; SUBCUTANEOUS at 12:24

## 2018-04-22 RX ADMIN — Medication 500 MILLIGRAM(S): at 19:56

## 2018-04-22 RX ADMIN — Medication 2: at 12:24

## 2018-04-22 RX ADMIN — HYDROMORPHONE HYDROCHLORIDE 2 MILLIGRAM(S): 2 INJECTION INTRAMUSCULAR; INTRAVENOUS; SUBCUTANEOUS at 16:55

## 2018-04-22 RX ADMIN — HYDROMORPHONE HYDROCHLORIDE 0.5 MILLIGRAM(S): 2 INJECTION INTRAMUSCULAR; INTRAVENOUS; SUBCUTANEOUS at 03:24

## 2018-04-22 RX ADMIN — Medication 500 MILLIGRAM(S): at 13:53

## 2018-04-22 RX ADMIN — HYDROMORPHONE HYDROCHLORIDE 0.5 MILLIGRAM(S): 2 INJECTION INTRAMUSCULAR; INTRAVENOUS; SUBCUTANEOUS at 10:21

## 2018-04-22 RX ADMIN — HYDROMORPHONE HYDROCHLORIDE 0.5 MILLIGRAM(S): 2 INJECTION INTRAMUSCULAR; INTRAVENOUS; SUBCUTANEOUS at 02:24

## 2018-04-22 RX ADMIN — HEPARIN SODIUM 5000 UNIT(S): 5000 INJECTION INTRAVENOUS; SUBCUTANEOUS at 06:45

## 2018-04-22 RX ADMIN — HYDROMORPHONE HYDROCHLORIDE 2 MILLIGRAM(S): 2 INJECTION INTRAMUSCULAR; INTRAVENOUS; SUBCUTANEOUS at 13:00

## 2018-04-22 RX ADMIN — HYDROMORPHONE HYDROCHLORIDE 2 MILLIGRAM(S): 2 INJECTION INTRAMUSCULAR; INTRAVENOUS; SUBCUTANEOUS at 00:45

## 2018-04-22 RX ADMIN — HYDROMORPHONE HYDROCHLORIDE 2 MILLIGRAM(S): 2 INJECTION INTRAMUSCULAR; INTRAVENOUS; SUBCUTANEOUS at 12:15

## 2018-04-22 RX ADMIN — Medication 100 MILLIGRAM(S): at 06:45

## 2018-04-22 RX ADMIN — HYDROMORPHONE HYDROCHLORIDE 0.5 MILLIGRAM(S): 2 INJECTION INTRAMUSCULAR; INTRAVENOUS; SUBCUTANEOUS at 10:35

## 2018-04-22 RX ADMIN — HEPARIN SODIUM 5000 UNIT(S): 5000 INJECTION INTRAVENOUS; SUBCUTANEOUS at 13:53

## 2018-04-22 RX ADMIN — HYDROMORPHONE HYDROCHLORIDE 2 MILLIGRAM(S): 2 INJECTION INTRAMUSCULAR; INTRAVENOUS; SUBCUTANEOUS at 07:13

## 2018-04-22 RX ADMIN — ONDANSETRON 4 MILLIGRAM(S): 8 TABLET, FILM COATED ORAL at 12:15

## 2018-04-22 RX ADMIN — Medication 25 MILLIGRAM(S): at 16:55

## 2018-04-22 RX ADMIN — FLUCONAZOLE 150 MILLIGRAM(S): 150 TABLET ORAL at 10:18

## 2018-04-22 RX ADMIN — Medication 500 MILLIGRAM(S): at 06:45

## 2018-04-22 RX ADMIN — HYDROMORPHONE HYDROCHLORIDE 2 MILLIGRAM(S): 2 INJECTION INTRAMUSCULAR; INTRAVENOUS; SUBCUTANEOUS at 17:45

## 2018-04-22 RX ADMIN — LINEZOLID 600 MILLIGRAM(S): 600 INJECTION, SOLUTION INTRAVENOUS at 10:18

## 2018-04-22 NOTE — PROGRESS NOTE ADULT - SUBJECTIVE AND OBJECTIVE BOX
ON : FAUSTINO. VSS  4/21 : as per plastic, will do panniculectomy as elective case. To set up VNS for wound care. Follow up with plastic on tuesday,  ON : PROSPER VSS  4/21 : as per plastic, will do panniculectomy as elective case. To set up VNS for wound care. Follow up with plastic on tuesday,      SUBJECTIVE:  pt seen at bedside with chief resident, without new complaints at this time    MEDICATIONS  (STANDING):  dextrose 5%. 1000 milliLiter(s) (50 mL/Hr) IV Continuous <Continuous>  dextrose 50% Injectable 12.5 Gram(s) IV Push once  dextrose 50% Injectable 25 Gram(s) IV Push once  dextrose 50% Injectable 25 Gram(s) IV Push once  docusate sodium 100 milliGRAM(s) Oral two times a day  fluconAZOLE   Tablet 150 milliGRAM(s) Oral daily  heparin  Injectable 5000 Unit(s) SubCutaneous every 8 hours  insulin lispro (HumaLOG) corrective regimen sliding scale   SubCutaneous Before meals and at bedtime  linezolid    Tablet 600 milliGRAM(s) Oral every 12 hours  LORazepam     Tablet 1 milliGRAM(s) Oral once  metoprolol tartrate Injectable 5 milliGRAM(s) IV Push once  metroNIDAZOLE    Tablet 500 milliGRAM(s) Oral every 8 hours  senna 2 Tablet(s) Oral at bedtime    MEDICATIONS  (PRN):  bisacodyl 5 milliGRAM(s) Oral every 12 hours PRN Constipation  dextrose Gel 1 Dose(s) Oral once PRN Blood Glucose LESS THAN 70 milliGRAM(s)/deciliter  glucagon  Injectable 1 milliGRAM(s) IntraMuscular once PRN Glucose LESS THAN 70 milligrams/deciliter  HYDROmorphone   Tablet 2 milliGRAM(s) Oral every 4 hours PRN Moderate Pain (4 - 6)  HYDROmorphone  Injectable 0.5 milliGRAM(s) IV Push every 4 hours PRN severe breakthrough pain  ondansetron Injectable 4 milliGRAM(s) IV Push every 6 hours PRN Nausea and/or Vomiting      Vital Signs Last 24 Hrs  T(C): 37.2 (22 Apr 2018 05:59), Max: 37.2 (21 Apr 2018 20:51)  T(F): 98.9 (22 Apr 2018 05:59), Max: 98.9 (21 Apr 2018 20:51)  HR: 91 (22 Apr 2018 05:59) (82 - 108)  BP: 150/82 (22 Apr 2018 05:59) (150/82 - 162/102)  BP(mean): --  RR: 18 (22 Apr 2018 05:59) (17 - 19)  SpO2: 97% (22 Apr 2018 05:59) (97% - 99%)    PHYSICAL EXAM:      Constitutional: A&Ox3    Respiratory: non labored breathing, no respiratory distress    Cardiovascular: NSR, RRR    Gastrointestinal: soft, moderate tenderness at wound site, wound dressed without saturation    Extremities: (-) edema          I&O's Detail    21 Apr 2018 07:01  -  22 Apr 2018 07:00  --------------------------------------------------------  IN:    Oral Fluid: 680 mL  Total IN: 680 mL    OUT:    Voided: 850 mL  Total OUT: 850 mL    Total NET: -170 mL          LABS:                        13.1   6.8   )-----------( 331      ( 21 Apr 2018 08:55 )             40.6     04-21    138  |  100  |  5<L>  ----------------------------<  121<H>  3.8   |  26  |  0.68    Ca    9.2      21 Apr 2018 08:55  Phos  3.7     04-21  Mg     2.1     04-21            RADIOLOGY & ADDITIONAL STUDIES:

## 2018-04-22 NOTE — PROGRESS NOTE ADULT - ASSESSMENT
39 yo F h/o HTN, DM, HLD, MO, prior ventral hernia repair with infected mesh, s/p abdominal fistulectomy explant of infected mesh (3/2018), readmission for wound infection s/p I&D 4/17.    - Regular diet  - Pain control  - Antibiotic : Fluconazole (4/20-4/22)/ Linezolid (4/20-?)/ Flagyl (4/16-?)  - ISS  - HSQ/SCDs  - wet to dry dressing to wound  - Plastic recs : will do panniculectomy as elective procedure.  - Dispo : DC once VNS set up 37 yo F h/o HTN, DM, HLD, MO, prior ventral hernia repair with infected mesh, s/p abdominal fistulectomy explant of infected mesh (3/2018), readmission for wound infection s/p I&D 4/17.    - Regular diet  - Pain control  - Antibiotic : Fluconazole (4/20-4/22)/ Linezolid (4/20-?)/ Flagyl (4/16-?)  - ISS  - HSQ/SCDs  - wet to dry dressing to wound  - Plastic recs : will do panniculectomy as elective procedure.  - Dispo : DC once VNS set up

## 2018-04-22 NOTE — PROGRESS NOTE ADULT - SUBJECTIVE AND OBJECTIVE BOX
INTERVAL HPI/OVERNIGHT EVENTS:  She is being discharged to return for outpatient evaluation for panniculectomy by Plastic Surgery.  Has been afebrile    CONSTITUTIONAL:  No fever, chills, night sweats  EYES:  No photophobia or visual changes  CARDIOVASCULAR:  No chest pain  RESPIRATORY:  No cough, wheezing, or SOB   GASTROINTESTINAL:  Feels distended, constipated.  GENITOURINARY:  No frequency, urgency, dysuria or hematuria  NEUROLOGIC:  No headache, lightheadedness      ANTIBIOTICS/RELEVANT:  Linezolid 600 mg po q12 h 4/20-present  Metronidazole 4/20-present    Aztreonam 4/14-4/20  Fluconazole 150 mg po q24h 4/19-4/21      Vital Signs Last 24 Hrs  T(C): 36.4 (22 Apr 2018 17:16), Max: 37.2 (21 Apr 2018 20:51)  T(F): 97.6 (22 Apr 2018 17:16), Max: 98.9 (21 Apr 2018 20:51)  HR: 95 (22 Apr 2018 17:16) (85 - 115)  BP: 156/90 (22 Apr 2018 17:16) (121/79 - 162/102)  BP(mean): --  RR: 17 (22 Apr 2018 17:16) (17 - 19)  SpO2: 99% (22 Apr 2018 15:02) (97% - 99%)    PHYSICAL EXAM:  Constitutional:  Well-developed, well nourished  Eyes:  Sclerae anicteric, conjunctivae clear  Ear/Nose/Throat:  No nasal exudate or sinus tenderness;  No buccal mucosal lesions, no pharyngeal erythema or exudate	  Neck:  Supple, no adenopathy  Respiratory:  Clear bilaterally  Cardiovascular:  RRR, S1S2, no murmur appreciated  Gastrointestinal:  Symmetric, normoactive BS, soft, tender to palpation.  Incision with dressing in place.  Extremities:  No edema      LABS:                        13.1   6.8   )-----------( 331      ( 21 Apr 2018 08:55 )             40.6         04-21    138  |  100  |  5<L>  ----------------------------<  121<H>  3.8   |  26  |  0.68    Ca    9.2      21 Apr 2018 08:55  Phos  3.7     04-21  Mg     2.1     04-21            MICROBIOLOGY:  Surgical swab abd wound OR:  No orgs seen, rare WBCs;  rare CNS S linezolid, rifampin, vancomycin R cefazolin, clinda, erythro, ox, PCN, TMP/SX  Surgical swab abd wound #2 OR:  No orgs seen, few WBCs, NGTD        RADIOLOGY & ADDITIONAL STUDIES:  < from: CT Abdomen and Pelvis No Cont (04.22.18 @ 16:05) >  FINDINGS: Images of the lower chest demonstrate no abnormality.    The liver is normal in appearance.   There is no dilatation of the intra   or extrahepatic biliary system. No radiopaque gallstones are seen.  The   pancreas is normal in appearance. No splenic abnormalities are seen.    There is a 1.1 cm left adrenal nodule, whose density measures less than   10 Hounsfield units, consistent with an adenoma. The kidneys are normal  in appearance bilaterally.  No renal stones are seen.  No hydronephrosis   is evident.    No abdominal aortic aneurysm is seen. Increased number of subcentimeter   mesenteric lymph nodes.    Evaluation of bowel is limited without oral contrast. Colonic   diverticulosis without evidence of acute diverticulitis. Mild rectal   fecal impaction. No bowel obstruction. No ascites is evident. The   appendix is normal. Interval decrease in the size of a small collection   overlying the mesh at the lowerabdomen. The collection now contains gas   consistent with recent instrumentation.    Images of the pelvis demonstrate the uterus and adnexae to be normal in   appearance. No filling defects are seen in the urinary bladder. No pelvic   lymphadenopathy is seen.    Evaluation of the osseous structures demonstrates minimal degenerative   changes of the spine.      IMPRESSION:    1.  Interval decrease in the size of the collection overlying the mesh at   the anterior lower abdominal wall. The collection now contains several   foci of gas.  2.  Mild rectal fecal impaction.  3.  Additional stable findings as above.    < end of copied text >

## 2018-04-22 NOTE — DISCHARGE NOTE ADULT - PLAN OF CARE
Recovery General Discharge Instructions:  Please resume all regular home medications unless specifically advised not to take a particular medication. Also, please take any new medications as prescribed.  Please get plenty of rest, continue to ambulate several times per day, and drink adequate amounts of fluids. Avoid lifting weights greater than 5-10 lbs until you follow-up with your surgeon, who will instruct you further regarding activity restrictions.  Avoid driving or operating heavy machinery while taking pain medications.  Please follow-up with your surgeon and Primary Care Provider (PCP) as advised.  Incision Care:  *Please call your doctor or nurse practitioner if you have increased pain, swelling, redness, or drainage from the incision site.  *Avoid swimming and baths until your follow-up appointment.  *You may shower, and wash surgical incisions with a mild soap and warm water. Gently pat the area dry.  *If you have staples, they will be removed at your follow-up appointment.  *If you have steri-strips, they will fall off on their own. Please remove any remaining strips 7-10 days after surgery. Warning Signs:  Please call your doctor or nurse practitioner if you experience the following:  *You experience new chest pain, pressure, squeezing or tightness.  *New or worsening cough, shortness of breath, or wheeze.  *If you are vomiting and cannot keep down fluids or your medications.  *You are getting dehydrated due to continued vomiting, diarrhea, or other reasons. Signs of dehydration include dry mouth, rapid heartbeat, or feeling dizzy or faint when standing.  *You see blood or dark/black material when you vomit or have a bowel movement.  *You experience burning when you urinate, have blood in your urine, or experience a discharge.  *Your pain is not improving within 8-12 hours or is not gone within 24 hours. Call or return immediately if your pain is getting worse, changes location, or moves to your chest or back.  *You have shaking chills, or fever greater than 101.5 degrees Fahrenheit or 38 degrees Celsius.  *Any change in your symptoms, or any new symptoms that concern you. Wound Care Please change wound dressing once a day. Pack loosely with 2in dry packing tape and cover with 4x4 gauze at the length of the incision, and an abd pad. Keep wound clean. Antibiotics and ID follow up Dr. Carty wants you to continue Linezolid until your panniculectomy, Flagyl to be stopped May 1st. Dr. Carty's office will call you to setup care. You will need to have blood drawn weekly. Plastics follow up Follow up with Dr. Walker on Tuesday

## 2018-04-22 NOTE — DISCHARGE NOTE ADULT - CARE PLAN
Principal Discharge DX:	H/O ventral hernia repair  Goal:	Recovery  Assessment and plan of treatment:	General Discharge Instructions:  Please resume all regular home medications unless specifically advised not to take a particular medication. Also, please take any new medications as prescribed.  Please get plenty of rest, continue to ambulate several times per day, and drink adequate amounts of fluids. Avoid lifting weights greater than 5-10 lbs until you follow-up with your surgeon, who will instruct you further regarding activity restrictions.  Avoid driving or operating heavy machinery while taking pain medications.  Please follow-up with your surgeon and Primary Care Provider (PCP) as advised.  Incision Care:  *Please call your doctor or nurse practitioner if you have increased pain, swelling, redness, or drainage from the incision site.  *Avoid swimming and baths until your follow-up appointment.  *You may shower, and wash surgical incisions with a mild soap and warm water. Gently pat the area dry.  *If you have staples, they will be removed at your follow-up appointment.  *If you have steri-strips, they will fall off on their own. Please remove any remaining strips 7-10 days after surgery.  Goal:	Recovery  Assessment and plan of treatment:	Warning Signs:  Please call your doctor or nurse practitioner if you experience the following:  *You experience new chest pain, pressure, squeezing or tightness.  *New or worsening cough, shortness of breath, or wheeze.  *If you are vomiting and cannot keep down fluids or your medications.  *You are getting dehydrated due to continued vomiting, diarrhea, or other reasons. Signs of dehydration include dry mouth, rapid heartbeat, or feeling dizzy or faint when standing.  *You see blood or dark/black material when you vomit or have a bowel movement.  *You experience burning when you urinate, have blood in your urine, or experience a discharge.  *Your pain is not improving within 8-12 hours or is not gone within 24 hours. Call or return immediately if your pain is getting worse, changes location, or moves to your chest or back.  *You have shaking chills, or fever greater than 101.5 degrees Fahrenheit or 38 degrees Celsius.  *Any change in your symptoms, or any new symptoms that concern you.  Goal:	Wound Care  Assessment and plan of treatment:	Please change wound dressing once a day. Pack loosely with 2in dry packing tape and cover with 4x4 gauze at the length of the incision, and an abd pad. Keep wound clean. Principal Discharge DX:	H/O ventral hernia repair  Goal:	Recovery  Assessment and plan of treatment:	General Discharge Instructions:  Please resume all regular home medications unless specifically advised not to take a particular medication. Also, please take any new medications as prescribed.  Please get plenty of rest, continue to ambulate several times per day, and drink adequate amounts of fluids. Avoid lifting weights greater than 5-10 lbs until you follow-up with your surgeon, who will instruct you further regarding activity restrictions.  Avoid driving or operating heavy machinery while taking pain medications.  Please follow-up with your surgeon and Primary Care Provider (PCP) as advised.  Incision Care:  *Please call your doctor or nurse practitioner if you have increased pain, swelling, redness, or drainage from the incision site.  *Avoid swimming and baths until your follow-up appointment.  *You may shower, and wash surgical incisions with a mild soap and warm water. Gently pat the area dry.  *If you have staples, they will be removed at your follow-up appointment.  *If you have steri-strips, they will fall off on their own. Please remove any remaining strips 7-10 days after surgery.  Goal:	Recovery  Assessment and plan of treatment:	Warning Signs:  Please call your doctor or nurse practitioner if you experience the following:  *You experience new chest pain, pressure, squeezing or tightness.  *New or worsening cough, shortness of breath, or wheeze.  *If you are vomiting and cannot keep down fluids or your medications.  *You are getting dehydrated due to continued vomiting, diarrhea, or other reasons. Signs of dehydration include dry mouth, rapid heartbeat, or feeling dizzy or faint when standing.  *You see blood or dark/black material when you vomit or have a bowel movement.  *You experience burning when you urinate, have blood in your urine, or experience a discharge.  *Your pain is not improving within 8-12 hours or is not gone within 24 hours. Call or return immediately if your pain is getting worse, changes location, or moves to your chest or back.  *You have shaking chills, or fever greater than 101.5 degrees Fahrenheit or 38 degrees Celsius.  *Any change in your symptoms, or any new symptoms that concern you.  Goal:	Wound Care  Assessment and plan of treatment:	Please change wound dressing once a day. Pack loosely with 2in dry packing tape and cover with 4x4 gauze at the length of the incision, and an abd pad. Keep wound clean.  Goal:	Antibiotics and ID follow up  Assessment and plan of treatment:	Dr. Carty wants you to continue Linezolid until your panniculectomy, Flagyl to be stopped May 1st. Dr. Carty's office will call you to setup care. You will need to have blood drawn weekly.  Goal:	Plastics follow up  Assessment and plan of treatment:	Follow up with Dr. Walker on Tuesday

## 2018-04-22 NOTE — DISCHARGE NOTE ADULT - PATIENT PORTAL LINK FT
You can access the [facility] Portal, offered by [facility], by registering with the following website: [email or web address]

## 2018-04-22 NOTE — DISCHARGE NOTE ADULT - HOSPITAL COURSE
37 yo F with PMH of HTN, DM, HLD, MO, prior ventral hernia repair 1/2017, complicated by wound infection requiring IR drainage 2/2017, recent abdominal fistulectomy of chronic fistula, explant of infected mesh, irrigation, repair with biologic mesh and placement of wound VAC in March. Pt reported on day of admission of increased purulent drainage from wound and foul odor. Visiting nurse removed vac and told pt to go to ED due to increased purulent drainage and pain at previous incision site. Normal BMs, able to tolerate PO. No fevers or chills. Patient was admitted to the surgical service and  was transferred to the surgical floor post op in fair condition and followed closely by General Surgery and Plastic Surgery. She was treated with IV antibiotics and the wound dressings were changed daily. She was taken to the OR on 4/17 for I&D of the abdominal wall incision, then transferred back to the surgical floor in fair condition for further observation. (to be completed) 39 yo F with PMH of HTN, DM, HLD, MO, prior ventral hernia repair 1/2017, complicated by wound infection requiring IR drainage 2/2017, recent abdominal fistulectomy of chronic fistula, explant of infected mesh, irrigation, repair with biologic mesh and placement of wound VAC in March. Pt reported on day of admission of increased purulent drainage from wound and foul odor. Visiting nurse removed vac and told pt to go to ED due to increased purulent drainage and pain at previous incision site. Normal BMs, able to tolerate PO. No fevers or chills. Patient was admitted to the surgical service and  was transferred to the surgical floor post op in fair condition and followed closely the surgical team. She was treated with IV antibiotics and the wound dressings were changed daily. She was taken to the OR on 4/17 for I&D of the abdominal wall incision, then transferred back to the surgical floor in fair condition for further observation and to manage dressing changes. Post op, plastic surgery and ID teams were consulted and recommendation were reviewed and ordered. Further surgical intervention recommended by Plastic Surgery was on an outpatient basis. The patient underwent a CT scan prior to discharge and was then cleared with VNS wound care and PO antibiotics

## 2018-04-22 NOTE — DISCHARGE NOTE ADULT - CARE PROVIDERS DIRECT ADDRESSES
,DirectAddress_Unknown ,DirectAddress_Unknown,[email or web address],DirectAddress_Unknown

## 2018-04-22 NOTE — DISCHARGE NOTE ADULT - MEDICATION SUMMARY - MEDICATIONS TO TAKE
I will START or STAY ON the medications listed below when I get home from the hospital:    oxyCODONE-acetaminophen 5 mg-325 mg oral tablet  -- 1 tab(s) by mouth every 6 hours MDD:4  -- Caution federal law prohibits the transfer of this drug to any person other  than the person for whom it was prescribed.  May cause drowsiness.  Alcohol may intensify this effect.  Use care when operating dangerous machinery.  This prescription cannot be refilled.  This product contains acetaminophen.  Do not use  with any other product containing acetaminophen to prevent possible liver damage.  Using more of this medication than prescribed may cause serious breathing problems.    -- Indication: For Pain med    ibuprofen 400 mg oral tablet  -- 1 tab(s) by mouth every 6 hours MDD:4 as needed for pain   -- Do not take this drug if you are pregnant.  It is very important that you take or use this exactly as directed.  Do not skip doses or discontinue unless directed by your doctor.  May cause drowsiness or dizziness.  Obtain medical advice before taking any non-prescription drugs as some may affect the action of this medication.  Take with food or milk.    -- Indication: For Home med

## 2018-04-22 NOTE — PROGRESS NOTE ADULT - ASSESSMENT
38 year old female w/ DM, ventral hernia repair 1/2017, complicated by polymicrobial wound infection w/ abdominal wall collection associated with mesh (cx with E. faecalis, Actinomyces, Bacteroides, Prevotella, Peptostreptococcus, and Bacillus) s/p course of ertapenem.  She subsequently underwent fistulectomy, drainage of abscess, removal of infected mesh and placement of biologic mesh on 3/14/18 (OR cx negative). Now she again presents with pelvic pain and malodorous drainage from abdominal wound s/p I&D 4/17. Her OR cultures from 3/14 did not grow and she only received clinda "prophylaxis" for a few days at that time.  After she was off antibiotics for an extended period, culture of collection from OR on 4/17 has grown only coagulase negative staph to date.  Culture is being held for anaerobes.  CT today still shows fluid collection concerning for residual infection.  Unfortunately, she is allergic to IV contrast.  She is being d/dina today per primary team.  Suggest:  - Continue metronidazole through 5/1 then stop  - Would continue linezolid until collection resolves unless she can get a CT with contrast.  She will need weekly monitory of CBC and CMP b/o concern for thrombocytopenia, LFT abnormalities.  Can do through my office - please give her the phone number ([phone], option 2).  I will arrange for her follow-up.

## 2018-04-22 NOTE — PROGRESS NOTE ADULT - SUBJECTIVE AND OBJECTIVE BOX
SUBJECTIVE:  Doing well.   No overnight events.   Remains afebrile.    OBJECTIVE:     ** VITAL SIGNS / I&O's **    Vital Signs Last 24 Hrs  T(C): 37.2 (22 Apr 2018 05:59), Max: 37.2 (21 Apr 2018 20:51)  T(F): 98.9 (22 Apr 2018 05:59), Max: 98.9 (21 Apr 2018 20:51)  HR: 91 (22 Apr 2018 05:59) (82 - 108)  BP: 150/82 (22 Apr 2018 05:59) (150/82 - 162/102)  BP(mean): --  RR: 18 (22 Apr 2018 05:59) (17 - 19)  SpO2: 97% (22 Apr 2018 05:59) (97% - 99%)      21 Apr 2018 07:01  -  22 Apr 2018 07:00  --------------------------------------------------------  IN:    Oral Fluid: 680 mL  Total IN: 680 mL    OUT:    Voided: 850 mL  Total OUT: 850 mL    Total NET: -170 mL          ** PHYSICAL EXAM **    -- CONSTITUTIONAL: AOx3. NAD.   -- No resp distress  -- ABDOMEN: soft, ND, min incisional tenderness, dressing intact      ** LABS **                          13.1   6.8   )-----------( 331      ( 21 Apr 2018 08:55 )             40.6     21 Apr 2018 08:55    138    |  100    |  5      ----------------------------<  121    3.8     |  26     |  0.68     Ca    9.2        21 Apr 2018 08:55  Phos  3.7       21 Apr 2018 08:55  Mg     2.1       21 Apr 2018 08:55        CAPILLARY BLOOD GLUCOSE      POCT Blood Glucose.: 113 mg/dL (22 Apr 2018 07:08)  POCT Blood Glucose.: 154 mg/dL (21 Apr 2018 21:45)  POCT Blood Glucose.: 127 mg/dL (21 Apr 2018 18:03)  POCT Blood Glucose.: 155 mg/dL (21 Apr 2018 11:03)            A/P: 39 yo obese F with PMHx of HTN, DM, HLD, prior VHR with synthetic mesh in 1/2017 c/b wound infection requiring IR drainage 2/17, recent abdominal fistulectomy, explantation of infected synthetic mesh, component separation with biologic mesh and VAC in 3/18 presented with abdominal wound breakdown and purulent drainage s/p OR washout and drainage of abdominal seroma (4/17/18)    - cont local wound care  - PO ABX as per ID  - Pain control  - Ambulation  - DVT prophylaxis   - Plan for elective panniculectomy upon discharge, f/u with Dr. Walker on Tuesday upon D/C SUBJECTIVE:  Doing well.   No overnight events.   Remains afebrile.    OBJECTIVE:     ** VITAL SIGNS / I&O's **    Vital Signs Last 24 Hrs  T(C): 37.2 (22 Apr 2018 05:59), Max: 37.2 (21 Apr 2018 20:51)  T(F): 98.9 (22 Apr 2018 05:59), Max: 98.9 (21 Apr 2018 20:51)  HR: 91 (22 Apr 2018 05:59) (82 - 108)  BP: 150/82 (22 Apr 2018 05:59) (150/82 - 162/102)  BP(mean): --  RR: 18 (22 Apr 2018 05:59) (17 - 19)  SpO2: 97% (22 Apr 2018 05:59) (97% - 99%)      21 Apr 2018 07:01  -  22 Apr 2018 07:00  --------------------------------------------------------  IN:    Oral Fluid: 680 mL  Total IN: 680 mL    OUT:    Voided: 850 mL  Total OUT: 850 mL    Total NET: -170 mL          ** PHYSICAL EXAM **    -- CONSTITUTIONAL: AOx3. NAD.   -- No resp distress  -- ABDOMEN: refused abdominal exam this morning      ** LABS **                          13.1   6.8   )-----------( 331      ( 21 Apr 2018 08:55 )             40.6     21 Apr 2018 08:55    138    |  100    |  5      ----------------------------<  121    3.8     |  26     |  0.68     Ca    9.2        21 Apr 2018 08:55  Phos  3.7       21 Apr 2018 08:55  Mg     2.1       21 Apr 2018 08:55        CAPILLARY BLOOD GLUCOSE      POCT Blood Glucose.: 113 mg/dL (22 Apr 2018 07:08)  POCT Blood Glucose.: 154 mg/dL (21 Apr 2018 21:45)  POCT Blood Glucose.: 127 mg/dL (21 Apr 2018 18:03)  POCT Blood Glucose.: 155 mg/dL (21 Apr 2018 11:03)            A/P: 37 yo obese F with PMHx of HTN, DM, HLD, prior VHR with synthetic mesh in 1/2017 c/b wound infection requiring IR drainage 2/17, recent abdominal fistulectomy, explantation of infected synthetic mesh, component separation with biologic mesh and VAC in 3/18 presented with abdominal wound breakdown and purulent drainage s/p OR washout and drainage of abdominal seroma (4/17/18)    - cont local wound care  - PO ABX as per ID  - Pain control  - Ambulation  - DVT prophylaxis   - Plan for elective panniculectomy upon discharge, f/u with Dr. Walker on Tuesday upon D/C

## 2018-04-22 NOTE — PROGRESS NOTE ADULT - PROVIDER SPECIALTY LIST ADULT
Infectious Disease
Pain Medicine
Plastic Surgery
Plastic Surgery
Surgery

## 2018-04-22 NOTE — DISCHARGE NOTE ADULT - PROVIDER TOKENS
TOKANGEL LUIS:'[id number]' TOKEN:'[id number]',TOKEN:'[id number]',TOKEN:'[id number]'

## 2018-04-22 NOTE — DISCHARGE NOTE ADULT - CARE PROVIDER_API CALL
Hema Hurtado), Surgery  [address] [place] [address]  Phone: [phone]  Fax: [phone] Hema Hurtado), Surgery  [address] [address]  Phone: [phone]  Fax: [phone]    Ginette Carty (MD), Infectious Disease; Internal Medicine  [address]  [address] [place] [address]  Phone: [phone]  Fax: [phone]    Carmelo Walker), Plastic Surgery; Surgery  [address] [address]  Phone: [phone]  Fax: [phone]

## 2018-04-23 RX ORDER — LINEZOLID 600 MG/300ML
1 INJECTION, SOLUTION INTRAVENOUS
Qty: 60 | Refills: 0 | OUTPATIENT
Start: 2018-04-23 | End: 2018-05-22

## 2018-04-23 RX ORDER — METRONIDAZOLE 500 MG
1 TABLET ORAL
Qty: 27 | Refills: 0 | OUTPATIENT
Start: 2018-04-23 | End: 2018-05-01

## 2018-04-26 DIAGNOSIS — E78.5 HYPERLIPIDEMIA, UNSPECIFIED: ICD-10-CM

## 2018-04-26 DIAGNOSIS — Z98.890 OTHER SPECIFIED POSTPROCEDURAL STATES: ICD-10-CM

## 2018-04-26 DIAGNOSIS — E66.9 OBESITY, UNSPECIFIED: ICD-10-CM

## 2018-04-26 DIAGNOSIS — T81.4XXA INFECTION FOLLOWING A PROCEDURE, INITIAL ENCOUNTER: ICD-10-CM

## 2018-04-26 DIAGNOSIS — K91.872 POSTPROCEDURAL SEROMA OF A DIGESTIVE SYSTEM ORGAN OR STRUCTURE FOLLOWING A DIGESTIVE SYSTEM PROCEDURE: ICD-10-CM

## 2018-04-26 DIAGNOSIS — Z88.0 ALLERGY STATUS TO PENICILLIN: ICD-10-CM

## 2018-04-26 DIAGNOSIS — I10 ESSENTIAL (PRIMARY) HYPERTENSION: ICD-10-CM

## 2018-04-26 DIAGNOSIS — L02.211 CUTANEOUS ABSCESS OF ABDOMINAL WALL: ICD-10-CM

## 2018-04-26 DIAGNOSIS — F17.210 NICOTINE DEPENDENCE, CIGARETTES, UNCOMPLICATED: ICD-10-CM
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
## 2018-04-26 DIAGNOSIS — E11.9 TYPE 2 DIABETES MELLITUS WITHOUT COMPLICATIONS: ICD-10-CM

## 2018-04-27 LAB
VIRUS SPEC CULT: SIGNIFICANT CHANGE UP
VIRUS SPEC CULT: SIGNIFICANT CHANGE UP

## 2018-05-02 LAB
CULTURE RESULTS: NO GROWTH — SIGNIFICANT CHANGE UP
SPECIMEN SOURCE: SIGNIFICANT CHANGE UP

## 2018-05-16 LAB
CULTURE RESULTS: SIGNIFICANT CHANGE UP
CULTURE RESULTS: SIGNIFICANT CHANGE UP
SPECIMEN SOURCE: SIGNIFICANT CHANGE UP
SPECIMEN SOURCE: SIGNIFICANT CHANGE UP

## 2018-05-23 PROCEDURE — 84100 ASSAY OF PHOSPHORUS: CPT

## 2018-05-23 PROCEDURE — 80202 ASSAY OF VANCOMYCIN: CPT

## 2018-05-23 PROCEDURE — 80053 COMPREHEN METABOLIC PANEL: CPT

## 2018-05-23 PROCEDURE — 96375 TX/PRO/DX INJ NEW DRUG ADDON: CPT

## 2018-05-23 PROCEDURE — 81003 URINALYSIS AUTO W/O SCOPE: CPT

## 2018-05-23 PROCEDURE — 85730 THROMBOPLASTIN TIME PARTIAL: CPT

## 2018-05-23 PROCEDURE — 86901 BLOOD TYPING SEROLOGIC RH(D): CPT

## 2018-05-23 PROCEDURE — 83735 ASSAY OF MAGNESIUM: CPT

## 2018-05-23 PROCEDURE — 74176 CT ABD & PELVIS W/O CONTRAST: CPT

## 2018-05-23 PROCEDURE — 81025 URINE PREGNANCY TEST: CPT

## 2018-05-23 PROCEDURE — 87075 CULTR BACTERIA EXCEPT BLOOD: CPT

## 2018-05-23 PROCEDURE — 71045 X-RAY EXAM CHEST 1 VIEW: CPT

## 2018-05-23 PROCEDURE — 83605 ASSAY OF LACTIC ACID: CPT

## 2018-05-23 PROCEDURE — 82962 GLUCOSE BLOOD TEST: CPT

## 2018-05-23 PROCEDURE — 85610 PROTHROMBIN TIME: CPT

## 2018-05-23 PROCEDURE — 36415 COLL VENOUS BLD VENIPUNCTURE: CPT

## 2018-05-23 PROCEDURE — 86850 RBC ANTIBODY SCREEN: CPT

## 2018-05-23 PROCEDURE — 85025 COMPLETE CBC W/AUTO DIFF WBC: CPT

## 2018-05-23 PROCEDURE — 87070 CULTURE OTHR SPECIMN AEROBIC: CPT

## 2018-05-23 PROCEDURE — 88304 TISSUE EXAM BY PATHOLOGIST: CPT

## 2018-05-23 PROCEDURE — 87116 MYCOBACTERIA CULTURE: CPT

## 2018-05-23 PROCEDURE — 84702 CHORIONIC GONADOTROPIN TEST: CPT

## 2018-05-23 PROCEDURE — 85027 COMPLETE CBC AUTOMATED: CPT

## 2018-05-23 PROCEDURE — 99285 EMERGENCY DEPT VISIT HI MDM: CPT | Mod: 25

## 2018-05-23 PROCEDURE — 87186 SC STD MICRODIL/AGAR DIL: CPT

## 2018-05-23 PROCEDURE — 93005 ELECTROCARDIOGRAM TRACING: CPT

## 2018-05-23 PROCEDURE — C1781: CPT

## 2018-05-23 PROCEDURE — 96374 THER/PROPH/DIAG INJ IV PUSH: CPT | Mod: XU

## 2018-05-23 PROCEDURE — 86900 BLOOD TYPING SEROLOGIC ABO: CPT

## 2018-05-23 PROCEDURE — 87206 SMEAR FLUORESCENT/ACID STAI: CPT

## 2018-05-23 PROCEDURE — 80048 BASIC METABOLIC PNL TOTAL CA: CPT

## 2018-05-23 PROCEDURE — 96376 TX/PRO/DX INJ SAME DRUG ADON: CPT

## 2018-05-23 PROCEDURE — 87252 VIRUS INOCULATION TISSUE: CPT

## 2018-05-23 PROCEDURE — 71046 X-RAY EXAM CHEST 2 VIEWS: CPT

## 2018-05-23 PROCEDURE — 87205 SMEAR GRAM STAIN: CPT

## 2018-05-23 PROCEDURE — 74177 CT ABD & PELVIS W/CONTRAST: CPT

## 2018-05-23 PROCEDURE — 87040 BLOOD CULTURE FOR BACTERIA: CPT

## 2018-05-23 PROCEDURE — 83036 HEMOGLOBIN GLYCOSYLATED A1C: CPT

## 2018-05-23 PROCEDURE — 96374 THER/PROPH/DIAG INJ IV PUSH: CPT

## 2018-05-23 PROCEDURE — 87102 FUNGUS ISOLATION CULTURE: CPT

## 2018-07-06 ENCOUNTER — APPOINTMENT (OUTPATIENT)
Dept: INFECTIOUS DISEASE | Facility: CLINIC | Age: 38
End: 2018-07-06

## 2018-09-07 NOTE — DISCHARGE NOTE ADULT - WITHDRAWAL SYMPTOMS INCLUDE; NEGATIVE MOOD, URGES TO SMOKE, AND DIFFICULTY CONCENTRATING.
Pt transferred to [facility] report called to [facility] 7-14 Roberta Peterson is  pt's nurse, transferred via monitor impella device from cvl went with pt Statement Selected

## 2018-10-01 ENCOUNTER — EMERGENCY (EMERGENCY)
Facility: HOSPITAL | Age: 38
LOS: 1 days | Discharge: HOME | End: 2018-10-01
Admitting: EMERGENCY MEDICINE
Payer: COMMERCIAL

## 2018-10-01 VITALS
TEMPERATURE: 98 F | DIASTOLIC BLOOD PRESSURE: 82 MMHG | OXYGEN SATURATION: 98 % | HEART RATE: 89 BPM | RESPIRATION RATE: 18 BRPM | SYSTOLIC BLOOD PRESSURE: 137 MMHG

## 2018-10-01 DIAGNOSIS — Z91.013 ALLERGY TO SEAFOOD: ICD-10-CM

## 2018-10-01 DIAGNOSIS — Z3A.11 11 WEEKS GESTATION OF PREGNANCY: ICD-10-CM

## 2018-10-01 DIAGNOSIS — Z79.1 LONG TERM (CURRENT) USE OF NON-STEROIDAL ANTI-INFLAMMATORIES (NSAID): ICD-10-CM

## 2018-10-01 DIAGNOSIS — O99.611 DISEASES OF THE DIGESTIVE SYSTEM COMPLICATING PREGNANCY, FIRST TRIMESTER: ICD-10-CM

## 2018-10-01 DIAGNOSIS — Z98.89 OTHER SPECIFIED POSTPROCEDURAL STATES: Chronic | ICD-10-CM

## 2018-10-01 DIAGNOSIS — R10.30 LOWER ABDOMINAL PAIN, UNSPECIFIED: ICD-10-CM

## 2018-10-01 DIAGNOSIS — Z87.891 PERSONAL HISTORY OF NICOTINE DEPENDENCE: ICD-10-CM

## 2018-10-01 DIAGNOSIS — Z88.0 ALLERGY STATUS TO PENICILLIN: ICD-10-CM

## 2018-10-01 DIAGNOSIS — K03.81 CRACKED TOOTH: ICD-10-CM

## 2018-10-01 DIAGNOSIS — Z88.8 ALLERGY STATUS TO OTHER DRUGS, MEDICAMENTS AND BIOLOGICAL SUBSTANCES STATUS: ICD-10-CM

## 2018-10-01 DIAGNOSIS — Z98.890 OTHER SPECIFIED POSTPROCEDURAL STATES: Chronic | ICD-10-CM

## 2018-10-01 DIAGNOSIS — Z79.899 OTHER LONG TERM (CURRENT) DRUG THERAPY: ICD-10-CM

## 2018-10-01 PROCEDURE — 99284 EMERGENCY DEPT VISIT MOD MDM: CPT | Mod: 25

## 2018-10-01 NOTE — ED ADULT TRIAGE NOTE - CHIEF COMPLAINT QUOTE
pt complaining of right upper jaw/ and tooth pain tonight after she ate something and heard a "crack" no bleeding or drainage seen in triage. pt took 800mg Motrin prior to arrival

## 2018-10-02 VITALS
HEART RATE: 99 BPM | DIASTOLIC BLOOD PRESSURE: 83 MMHG | RESPIRATION RATE: 18 BRPM | TEMPERATURE: 98 F | SYSTOLIC BLOOD PRESSURE: 147 MMHG | OXYGEN SATURATION: 98 %

## 2018-10-02 LAB
ALBUMIN SERPL ELPH-MCNC: 3.8 G/DL — SIGNIFICANT CHANGE UP (ref 3.3–5)
ALP SERPL-CCNC: 77 U/L — SIGNIFICANT CHANGE UP (ref 40–120)
ALT FLD-CCNC: 11 U/L — SIGNIFICANT CHANGE UP (ref 10–45)
ANION GAP SERPL CALC-SCNC: 16 MMOL/L — SIGNIFICANT CHANGE UP (ref 5–17)
AST SERPL-CCNC: 13 U/L — SIGNIFICANT CHANGE UP (ref 10–40)
BASOPHILS NFR BLD AUTO: 0.1 % — SIGNIFICANT CHANGE UP (ref 0–2)
BILIRUB SERPL-MCNC: <0.2 MG/DL — SIGNIFICANT CHANGE UP (ref 0.2–1.2)
BUN SERPL-MCNC: 9 MG/DL — SIGNIFICANT CHANGE UP (ref 7–23)
CALCIUM SERPL-MCNC: 9.5 MG/DL — SIGNIFICANT CHANGE UP (ref 8.4–10.5)
CHLORIDE SERPL-SCNC: 97 MMOL/L — SIGNIFICANT CHANGE UP (ref 96–108)
CO2 SERPL-SCNC: 22 MMOL/L — SIGNIFICANT CHANGE UP (ref 22–31)
CREAT SERPL-MCNC: 0.63 MG/DL — SIGNIFICANT CHANGE UP (ref 0.5–1.3)
EOSINOPHIL NFR BLD AUTO: 0.7 % — SIGNIFICANT CHANGE UP (ref 0–6)
GLUCOSE SERPL-MCNC: 132 MG/DL — HIGH (ref 70–99)
HCG SERPL-ACNC: HIGH MIU/ML
HCT VFR BLD CALC: 37.7 % — SIGNIFICANT CHANGE UP (ref 34.5–45)
HGB BLD-MCNC: 12.4 G/DL — SIGNIFICANT CHANGE UP (ref 11.5–15.5)
LYMPHOCYTES # BLD AUTO: 24.6 % — SIGNIFICANT CHANGE UP (ref 13–44)
MCHC RBC-ENTMCNC: 27 PG — SIGNIFICANT CHANGE UP (ref 27–34)
MCHC RBC-ENTMCNC: 32.9 G/DL — SIGNIFICANT CHANGE UP (ref 32–36)
MCV RBC AUTO: 82 FL — SIGNIFICANT CHANGE UP (ref 80–100)
MONOCYTES NFR BLD AUTO: 6.2 % — SIGNIFICANT CHANGE UP (ref 2–14)
NEUTROPHILS NFR BLD AUTO: 68.4 % — SIGNIFICANT CHANGE UP (ref 43–77)
PLATELET # BLD AUTO: 308 K/UL — SIGNIFICANT CHANGE UP (ref 150–400)
POTASSIUM SERPL-MCNC: 3.5 MMOL/L — SIGNIFICANT CHANGE UP (ref 3.5–5.3)
POTASSIUM SERPL-SCNC: 3.5 MMOL/L — SIGNIFICANT CHANGE UP (ref 3.5–5.3)
PROT SERPL-MCNC: 7 G/DL — SIGNIFICANT CHANGE UP (ref 6–8.3)
RBC # BLD: 4.6 M/UL — SIGNIFICANT CHANGE UP (ref 3.8–5.2)
RBC # FLD: 17.8 % — HIGH (ref 10.3–16.9)
SODIUM SERPL-SCNC: 135 MMOL/L — SIGNIFICANT CHANGE UP (ref 135–145)
WBC # BLD: 13.4 K/UL — HIGH (ref 3.8–10.5)
WBC # FLD AUTO: 13.4 K/UL — HIGH (ref 3.8–10.5)

## 2018-10-02 PROCEDURE — 99284 EMERGENCY DEPT VISIT MOD MDM: CPT | Mod: 25

## 2018-10-02 PROCEDURE — 96374 THER/PROPH/DIAG INJ IV PUSH: CPT

## 2018-10-02 PROCEDURE — 85025 COMPLETE CBC W/AUTO DIFF WBC: CPT

## 2018-10-02 PROCEDURE — 84702 CHORIONIC GONADOTROPIN TEST: CPT

## 2018-10-02 PROCEDURE — 36415 COLL VENOUS BLD VENIPUNCTURE: CPT

## 2018-10-02 PROCEDURE — 76801 OB US < 14 WKS SINGLE FETUS: CPT

## 2018-10-02 PROCEDURE — 76817 TRANSVAGINAL US OBSTETRIC: CPT | Mod: 26

## 2018-10-02 PROCEDURE — 80053 COMPREHEN METABOLIC PANEL: CPT

## 2018-10-02 PROCEDURE — 76817 TRANSVAGINAL US OBSTETRIC: CPT

## 2018-10-02 PROCEDURE — 76801 OB US < 14 WKS SINGLE FETUS: CPT | Mod: 26

## 2018-10-02 RX ORDER — OXYCODONE AND ACETAMINOPHEN 5; 325 MG/1; MG/1
1 TABLET ORAL ONCE
Qty: 0 | Refills: 0 | Status: DISCONTINUED | OUTPATIENT
Start: 2018-10-02 | End: 2018-10-02

## 2018-10-02 RX ORDER — METOCLOPRAMIDE HCL 10 MG
10 TABLET ORAL ONCE
Qty: 0 | Refills: 0 | Status: COMPLETED | OUTPATIENT
Start: 2018-10-02 | End: 2018-10-02

## 2018-10-02 RX ADMIN — Medication 10 MILLIGRAM(S): at 02:04

## 2018-10-02 RX ADMIN — OXYCODONE AND ACETAMINOPHEN 1 TABLET(S): 5; 325 TABLET ORAL at 00:43

## 2018-10-02 RX ADMIN — OXYCODONE AND ACETAMINOPHEN 1 TABLET(S): 5; 325 TABLET ORAL at 02:04

## 2018-10-02 NOTE — ED ADULT NURSE NOTE - OBJECTIVE STATEMENT
p/w toothache right lower states she crushed them while eating bread earlier today. ICON + , pt unaware, no vag bleeding, .

## 2018-10-02 NOTE — ED PROVIDER NOTE - MEDICAL DECISION MAKING DETAILS
37 y/o f toothache with chipped tooth; given percocet in ED, noted to be incidentally pregnancy +, LMP july, with mild lower abd pain, nontender on exam.  VSS, labs wnl, u/s showing IUP 11wks 3 days with FH.  Pt will be d/c on clindamycin, tylenol for pain, f/u dental clinic and ob/gyn.

## 2018-10-02 NOTE — ED ADULT NURSE NOTE - NSIMPLEMENTINTERV_GEN_ALL_ED
Implemented All Universal Safety Interventions:  [place] to call system. Call bell, personal items and telephone within reach. Instruct patient to call for assistance. Room bathroom lighting operational. Non-slip footwear when patient is off stretcher. Physically safe environment: no spills, clutter or unnecessary equipment. Stretcher in lowest position, wheels locked, appropriate side rails in place.

## 2018-10-02 NOTE — ED PROVIDER NOTE - OBJECTIVE STATEMENT
39 y/o f presents c/o right toothache after it chipped while eating today.  Pt also c/o left lower abd pain which has been mild for several days, vomited today.  Denies fever, chills, all other ROS negative.

## 2018-10-22 ENCOUNTER — EMERGENCY (EMERGENCY)
Facility: HOSPITAL | Age: 38
LOS: 1 days | Discharge: HOME | End: 2018-10-22
Attending: EMERGENCY MEDICINE | Admitting: EMERGENCY MEDICINE
Payer: COMMERCIAL

## 2018-10-22 VITALS
WEIGHT: 223.33 LBS | SYSTOLIC BLOOD PRESSURE: 139 MMHG | TEMPERATURE: 98 F | HEART RATE: 88 BPM | RESPIRATION RATE: 20 BRPM | DIASTOLIC BLOOD PRESSURE: 83 MMHG | OXYGEN SATURATION: 99 %

## 2018-10-22 VITALS
RESPIRATION RATE: 18 BRPM | OXYGEN SATURATION: 98 % | HEART RATE: 90 BPM | DIASTOLIC BLOOD PRESSURE: 92 MMHG | SYSTOLIC BLOOD PRESSURE: 150 MMHG | TEMPERATURE: 97 F

## 2018-10-22 DIAGNOSIS — Z3A.15 15 WEEKS GESTATION OF PREGNANCY: ICD-10-CM

## 2018-10-22 DIAGNOSIS — Z79.899 OTHER LONG TERM (CURRENT) DRUG THERAPY: ICD-10-CM

## 2018-10-22 DIAGNOSIS — Z98.890 OTHER SPECIFIED POSTPROCEDURAL STATES: Chronic | ICD-10-CM

## 2018-10-22 DIAGNOSIS — Z88.1 ALLERGY STATUS TO OTHER ANTIBIOTIC AGENTS STATUS: ICD-10-CM

## 2018-10-22 DIAGNOSIS — E78.5 HYPERLIPIDEMIA, UNSPECIFIED: ICD-10-CM

## 2018-10-22 DIAGNOSIS — Z88.0 ALLERGY STATUS TO PENICILLIN: ICD-10-CM

## 2018-10-22 DIAGNOSIS — Z79.1 LONG TERM (CURRENT) USE OF NON-STEROIDAL ANTI-INFLAMMATORIES (NSAID): ICD-10-CM

## 2018-10-22 DIAGNOSIS — Z98.89 OTHER SPECIFIED POSTPROCEDURAL STATES: Chronic | ICD-10-CM

## 2018-10-22 DIAGNOSIS — Z91.013 ALLERGY TO SEAFOOD: ICD-10-CM

## 2018-10-22 DIAGNOSIS — O26.892 OTHER SPECIFIED PREGNANCY RELATED CONDITIONS, SECOND TRIMESTER: ICD-10-CM

## 2018-10-22 DIAGNOSIS — I10 ESSENTIAL (PRIMARY) HYPERTENSION: ICD-10-CM

## 2018-10-22 DIAGNOSIS — E11.9 TYPE 2 DIABETES MELLITUS WITHOUT COMPLICATIONS: ICD-10-CM

## 2018-10-22 DIAGNOSIS — O26.42: ICD-10-CM

## 2018-10-22 DIAGNOSIS — Z79.891 LONG TERM (CURRENT) USE OF OPIATE ANALGESIC: ICD-10-CM

## 2018-10-22 DIAGNOSIS — Z91.041 RADIOGRAPHIC DYE ALLERGY STATUS: ICD-10-CM

## 2018-10-22 LAB
ALBUMIN SERPL ELPH-MCNC: 4 G/DL — SIGNIFICANT CHANGE UP (ref 3.3–5)
ALP SERPL-CCNC: 76 U/L — SIGNIFICANT CHANGE UP (ref 40–120)
ALT FLD-CCNC: 11 U/L — SIGNIFICANT CHANGE UP (ref 10–45)
ANION GAP SERPL CALC-SCNC: 17 MMOL/L — SIGNIFICANT CHANGE UP (ref 5–17)
APPEARANCE UR: CLEAR — SIGNIFICANT CHANGE UP
AST SERPL-CCNC: 15 U/L — SIGNIFICANT CHANGE UP (ref 10–40)
BASOPHILS NFR BLD AUTO: 0.2 % — SIGNIFICANT CHANGE UP (ref 0–2)
BILIRUB SERPL-MCNC: <0.2 MG/DL — SIGNIFICANT CHANGE UP (ref 0.2–1.2)
BILIRUB UR-MCNC: NEGATIVE — SIGNIFICANT CHANGE UP
BUN SERPL-MCNC: 6 MG/DL — LOW (ref 7–23)
CALCIUM SERPL-MCNC: 9.8 MG/DL — SIGNIFICANT CHANGE UP (ref 8.4–10.5)
CHLORIDE SERPL-SCNC: 99 MMOL/L — SIGNIFICANT CHANGE UP (ref 96–108)
CO2 SERPL-SCNC: 24 MMOL/L — SIGNIFICANT CHANGE UP (ref 22–31)
COLOR SPEC: YELLOW — SIGNIFICANT CHANGE UP
CREAT SERPL-MCNC: 0.57 MG/DL — SIGNIFICANT CHANGE UP (ref 0.5–1.3)
DIFF PNL FLD: NEGATIVE — SIGNIFICANT CHANGE UP
EOSINOPHIL NFR BLD AUTO: 1.1 % — SIGNIFICANT CHANGE UP (ref 0–6)
GLUCOSE SERPL-MCNC: 124 MG/DL — HIGH (ref 70–99)
GLUCOSE UR QL: NEGATIVE — SIGNIFICANT CHANGE UP
HCG SERPL-ACNC: HIGH MIU/ML
HCT VFR BLD CALC: 40.5 % — SIGNIFICANT CHANGE UP (ref 34.5–45)
HGB BLD-MCNC: 13.5 G/DL — SIGNIFICANT CHANGE UP (ref 11.5–15.5)
KETONES UR-MCNC: NEGATIVE — SIGNIFICANT CHANGE UP
LEUKOCYTE ESTERASE UR-ACNC: ABNORMAL
LYMPHOCYTES # BLD AUTO: 25.7 % — SIGNIFICANT CHANGE UP (ref 13–44)
MCHC RBC-ENTMCNC: 27.3 PG — SIGNIFICANT CHANGE UP (ref 27–34)
MCHC RBC-ENTMCNC: 33.3 G/DL — SIGNIFICANT CHANGE UP (ref 32–36)
MCV RBC AUTO: 82 FL — SIGNIFICANT CHANGE UP (ref 80–100)
MONOCYTES NFR BLD AUTO: 7.7 % — SIGNIFICANT CHANGE UP (ref 2–14)
NEUTROPHILS NFR BLD AUTO: 65.3 % — SIGNIFICANT CHANGE UP (ref 43–77)
NITRITE UR-MCNC: NEGATIVE — SIGNIFICANT CHANGE UP
PH UR: 6 — SIGNIFICANT CHANGE UP (ref 5–8)
PLATELET # BLD AUTO: 320 K/UL — SIGNIFICANT CHANGE UP (ref 150–400)
POTASSIUM SERPL-MCNC: 3.7 MMOL/L — SIGNIFICANT CHANGE UP (ref 3.5–5.3)
POTASSIUM SERPL-SCNC: 3.7 MMOL/L — SIGNIFICANT CHANGE UP (ref 3.5–5.3)
PROT SERPL-MCNC: 7.5 G/DL — SIGNIFICANT CHANGE UP (ref 6–8.3)
PROT UR-MCNC: NEGATIVE MG/DL — SIGNIFICANT CHANGE UP
RBC # BLD: 4.94 M/UL — SIGNIFICANT CHANGE UP (ref 3.8–5.2)
RBC # FLD: 16.5 % — SIGNIFICANT CHANGE UP (ref 10.3–16.9)
SODIUM SERPL-SCNC: 140 MMOL/L — SIGNIFICANT CHANGE UP (ref 135–145)
SP GR SPEC: >=1.03 — SIGNIFICANT CHANGE UP (ref 1–1.03)
UROBILINOGEN FLD QL: 0.2 E.U./DL — SIGNIFICANT CHANGE UP
WBC # BLD: 13.2 K/UL — HIGH (ref 3.8–10.5)
WBC # FLD AUTO: 13.2 K/UL — HIGH (ref 3.8–10.5)

## 2018-10-22 PROCEDURE — 76805 OB US >/= 14 WKS SNGL FETUS: CPT

## 2018-10-22 PROCEDURE — 87086 URINE CULTURE/COLONY COUNT: CPT

## 2018-10-22 PROCEDURE — 76817 TRANSVAGINAL US OBSTETRIC: CPT | Mod: 26

## 2018-10-22 PROCEDURE — 80053 COMPREHEN METABOLIC PANEL: CPT

## 2018-10-22 PROCEDURE — 76770 US EXAM ABDO BACK WALL COMP: CPT | Mod: 26

## 2018-10-22 PROCEDURE — 76817 TRANSVAGINAL US OBSTETRIC: CPT

## 2018-10-22 PROCEDURE — 86901 BLOOD TYPING SEROLOGIC RH(D): CPT

## 2018-10-22 PROCEDURE — 99284 EMERGENCY DEPT VISIT MOD MDM: CPT

## 2018-10-22 PROCEDURE — 86850 RBC ANTIBODY SCREEN: CPT

## 2018-10-22 PROCEDURE — 96374 THER/PROPH/DIAG INJ IV PUSH: CPT

## 2018-10-22 PROCEDURE — 76770 US EXAM ABDO BACK WALL COMP: CPT

## 2018-10-22 PROCEDURE — 87186 SC STD MICRODIL/AGAR DIL: CPT

## 2018-10-22 PROCEDURE — 76805 OB US >/= 14 WKS SNGL FETUS: CPT | Mod: 26

## 2018-10-22 PROCEDURE — 84702 CHORIONIC GONADOTROPIN TEST: CPT

## 2018-10-22 PROCEDURE — 99284 EMERGENCY DEPT VISIT MOD MDM: CPT | Mod: 25

## 2018-10-22 PROCEDURE — 81001 URINALYSIS AUTO W/SCOPE: CPT

## 2018-10-22 PROCEDURE — 36415 COLL VENOUS BLD VENIPUNCTURE: CPT

## 2018-10-22 PROCEDURE — 85025 COMPLETE CBC W/AUTO DIFF WBC: CPT

## 2018-10-22 PROCEDURE — 87491 CHLMYD TRACH DNA AMP PROBE: CPT

## 2018-10-22 PROCEDURE — 87591 N.GONORRHOEAE DNA AMP PROB: CPT

## 2018-10-22 PROCEDURE — 83690 ASSAY OF LIPASE: CPT

## 2018-10-22 PROCEDURE — 86900 BLOOD TYPING SEROLOGIC ABO: CPT

## 2018-10-22 RX ORDER — SODIUM CHLORIDE 9 MG/ML
1000 INJECTION INTRAMUSCULAR; INTRAVENOUS; SUBCUTANEOUS ONCE
Qty: 0 | Refills: 0 | Status: COMPLETED | OUTPATIENT
Start: 2018-10-22 | End: 2018-10-22

## 2018-10-22 RX ORDER — METOCLOPRAMIDE HCL 10 MG
10 TABLET ORAL ONCE
Qty: 0 | Refills: 0 | Status: COMPLETED | OUTPATIENT
Start: 2018-10-22 | End: 2018-10-22

## 2018-10-22 RX ADMIN — Medication 104 MILLIGRAM(S): at 22:11

## 2018-10-22 RX ADMIN — SODIUM CHLORIDE 2000 MILLILITER(S): 9 INJECTION INTRAMUSCULAR; INTRAVENOUS; SUBCUTANEOUS at 22:12

## 2018-10-22 NOTE — ED PROVIDER NOTE - MEDICAL DECISION MAKING DETAILS
15 wk pregnant with abdominal pain, vomiting, urinary symptoms.  Reports Tm 100.3 at home but AVSS here and does not appear volume depleted.  ? UTI/pyelo.  Plan: Labs, US retroperitoneal and obstetric. GYN consult.

## 2018-10-22 NOTE — ED PROVIDER NOTE - ATTENDING CONTRIBUTION TO CARE
38 F hx of DM, abd pain, HTN- co abd pain int f/c  L sided abd pain, urinary freq, burning  vag d/c  15 weeks preg  vss  s1s2 lungs cta bl  abd soft nt nd +bs  clear cervical d/c  IMP- Abd pain  labs, US

## 2018-10-22 NOTE — ED ADULT NURSE NOTE - NSIMPLEMENTINTERV_GEN_ALL_ED
Implemented All Universal Safety Interventions:  Divide to call system. Call bell, personal items and telephone within reach. Instruct patient to call for assistance. Room bathroom lighting operational. Non-slip footwear when patient is off stretcher. Physically safe environment: no spills, clutter or unnecessary equipment. Stretcher in lowest position, wheels locked, appropriate side rails in place.

## 2018-10-22 NOTE — ED PROVIDER NOTE - NS ED ROS FT
CONSTITUTIONAL: HPI  NEURO: No dizziness, no syncope; No focal weakness/tingling/numbness  EYES: No visual changes  ENT: No rhinorrhea or sore throat  PULM: No cough or dyspnea  CV: No chest pain or palpitations  GI: HPI  :  HPI  MSK: No neck pain, no joint pain  SKIN: no rash or unusual bruising

## 2018-10-22 NOTE — ED ADULT TRIAGE NOTE - ARRIVAL INFO ADDITIONAL COMMENTS
pt reports being 15 weeks. c/o headache, vomiting, unable to tolerable PO, foul smelling urine, vaginal discharge, left sided abd pain X 3 days. denies vaginal spotting or bleeding

## 2018-10-22 NOTE — ED PROVIDER NOTE - PHYSICAL EXAMINATION
CONSTITUTIONAL: WD,WN. NAD.    SKIN: Normal color and turgor. No rash.    HEAD: NC/AT.  EYES: Conjunctiva clear. EOMI. PERRL.    ENT: Airway patent, OP without erythema, tonsillar swelling or exudate; uvula midline without swelling. Nasal mucosa clear, no rhinorrhea.   RESPIRATORY:  Breathing non-labored. No retractions or accessory muscle use.  Lungs CTA bilat.  CARDIOVASCULAR:  RRR, S1S2. No M/R/G.      GI:  Abdomen obese.  Mild diffuse tenderness L>R.   : + left CVAT.  Pelvic chaperoned by ARMANDO Grant.  Small amt clear cervical DC.  Os closed. No CMT.  No adnexal tenderness.   MSK: Neck supple with painless ROM.  No lower extremity edema or calf tenderness.  No joint swelling or ROM limitation.  NEURO: Alert and oriented; CN II-XII grossly intact. Speech clear. 5/5 strength in all extremities.  Normal balance and gait. CONSTITUTIONAL: WD,WN. NAD.    SKIN: Normal color and turgor. No rash.    HEAD: NC/AT.  EYES: Conjunctiva clear. EOMI. PERRL.    ENT: Airway patent, OP without erythema, tonsillar swelling or exudate; uvula midline without swelling. Nasal mucosa clear, no rhinorrhea.   RESPIRATORY:  Breathing non-labored. No retractions or accessory muscle use.  Lungs CTA bilat.  CARDIOVASCULAR:  RRR, S1S2. No M/R/G.      GI:  Abdomen obese.  Mild diffuse tenderness L>R.   : + left CVAT.  Pelvic chaperoned by ARMANDO Grant.  Scant clear cervical DC.  Os closed. No CMT.  No adnexal tenderness.   MSK: Neck supple with painless ROM.  No lower extremity edema or calf tenderness.  No joint swelling or ROM limitation.  NEURO: Alert and oriented; CN II-XII grossly intact. Speech clear. 5/5 strength in all extremities.  Normal balance and gait.

## 2018-10-22 NOTE — ED PROVIDER NOTE - OBJECTIVE STATEMENT
pt with Hx HTN HLD DM obesity, hx of abdominal hernia repairs with mesh and infection in April 2018;  she is 15 weeks pregnant c/o abdominal pain, nausea and vomiting, dysuria, vaginal discharge, and headche for past couple of weeks.  The abdominal pain is LLQ radiating to left flank and down across lower abdomen to suprapubic region.  No VB.  Symptoms began after taking a course of oral antibiotics a few weeks ago for a dental infection.  Not having diarrhea, but reports vomiting at least 10 times/day.  She reports feeling feverish, Tm 100.3  Her answers are vague.  She estimates that she has been pregnant about 10 or so times, but birth hx reported ?  IUP on sono at previous ED visit, but has not had her first prenatal visit yet (scheduled to see Dr Mercy Joe in November).  She is not taking prenatal vitamins but takes folic acid daily.

## 2018-10-23 LAB
BLD GP AB SCN SERPL QL: NEGATIVE — SIGNIFICANT CHANGE UP
C TRACH RRNA SPEC QL NAA+PROBE: SIGNIFICANT CHANGE UP
N GONORRHOEA RRNA SPEC QL NAA+PROBE: SIGNIFICANT CHANGE UP
RH IG SCN BLD-IMP: NEGATIVE — SIGNIFICANT CHANGE UP
SPECIMEN SOURCE: SIGNIFICANT CHANGE UP

## 2018-10-23 RX ORDER — METHENAMINE MANDELATE 1 G
1 TABLET ORAL
Qty: 14 | Refills: 0 | OUTPATIENT
Start: 2018-10-23 | End: 2018-10-29

## 2018-10-23 RX ORDER — DOXYLAMINE SUCCINATE AND PYRIDOXINE HYDROCHLORIDE, DELAYED RELEASE TABLETS 10 MG/10 MG 10; 10 MG/1; MG/1
1 TABLET, DELAYED RELEASE ORAL
Qty: 60 | Refills: 0 | OUTPATIENT
Start: 2018-10-23 | End: 2018-11-21

## 2018-10-23 RX ORDER — NYSTATIN CREAM 100000 [USP'U]/G
1 CREAM TOPICAL
Qty: 1 | Refills: 0 | OUTPATIENT
Start: 2018-10-23 | End: 2018-11-05

## 2018-10-23 RX ORDER — METOCLOPRAMIDE HCL 10 MG
1 TABLET ORAL
Qty: 90 | Refills: 0 | OUTPATIENT
Start: 2018-10-23 | End: 2018-11-21

## 2018-10-23 RX ORDER — NITROFURANTOIN MACROCRYSTAL 50 MG
1 CAPSULE ORAL
Qty: 20 | Refills: 0 | OUTPATIENT
Start: 2018-10-23 | End: 2018-11-01

## 2018-10-23 NOTE — ED ADULT NURSE REASSESSMENT NOTE - NS ED NURSE REASSESS COMMENT FT1
Patient aoX 3, anxious, c/o of vague abdominal pain, headache w/ vaginal discharge, no vaginal bleed.  Vital signs stable. RIght aC PIV #20 in place, all labs sent, no complications.  Reglan IV, NSS 1 L bolus administered.  Patient in US at this time.  For OB GYN evaluation.  Endorsement report and care received by ARMANDO Ackerman at this time.

## 2018-10-23 NOTE — CONSULT NOTE ADULT - ASSESSMENT
- UTI   - Assessment and Plan:   39 yo [id number] at 15w1 day by second trimester US presents for evaluation of nausea and vomiting with abdominal pain.   - No acute gyn intervention needed at this time  - For nausea and vomiting, recommend outpatient Assessment and Plan:   37 yo [id number] at 15w1 day by second trimester US presents for evaluation of nausea and vomiting with abdominal pain.   - No acute gyn intervention needed at this time- vitals and labs WNL.   - For nausea and vomiting, recommend outpatient medication combination of B6 and Unisom; may also consider use of Reglan   - Patient has not yet seen an OBGYN this pregnancy; patient counseled to call her OB and see if she can present for care earlier than scheduled; patient instructed to be sure to tell OB about all her medical problems and significant OB history; stressed to patient the importance of initiating prenatal care and close follow up with high risk obstetrician /  maternal Fetal medicine specialist for her multiple comorbidities  - May consider use of ASA to help prevent PEC given patient's history of PEC   - For superficial wound breakdown, recommend keeping the wound clean and dry; continue Nystatin powder as directed; patient instructed to follow up in the office with her general surgeon   - Continue folate and B6; recommend prenatal vitamins if patient not too nauseous   - For UTI, recommend 7 day course of Macrobid 100 mg BID; will follow up the urine culture.     Patient evaluated with Gavin Hernandez, PGY4 - Chief resident and discussed with Dr. Duque. Assessment and Plan:   39 yo [id number] at 15w1 day by second trimester US presents for evaluation of nausea and vomiting with abdominal pain.   - No acute gyn intervention needed at this time- vitals and labs WNL.   - For nausea and vomiting, recommend outpatient medication combination of B6 and Unisom; may also consider use of Reglan   - Patient has not yet seen an OBGYN this pregnancy; patient counseled to call her OB and see if she can present for care earlier than scheduled; patient instructed to be sure to tell OB about all her medical problems and significant OB history; stressed to patient the importance of initiating prenatal care and close follow up with high risk obstetrician /  maternal Fetal medicine specialist for her multiple comorbidities  - May consider use of ASA to help prevent PEC given patient's history of PEC   - For superficial wound breakdown, recommend keeping the wound clean and dry; continue Nystatin powder as directed; patient instructed to follow up in the office with her general surgeon   - Continue folate and B6; recommend prenatal vitamins if patient not too nauseous   - Given urinary complaints and urine sample with bacteria and white blood cells, will treat for likely UTI: recommend 7 day course of Macrobid 100 mg BID; will follow up the urine culture.     Patient evaluated with Gavin Hernandez, PGY4 - Chief resident and discussed with Dr. Duque.
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
## 2018-10-25 LAB
-  AMPICILLIN/SULBACTAM: SIGNIFICANT CHANGE UP
-  AMPICILLIN: SIGNIFICANT CHANGE UP
-  CEFAZOLIN: SIGNIFICANT CHANGE UP
-  CEFTRIAXONE: SIGNIFICANT CHANGE UP
-  CIPROFLOXACIN: SIGNIFICANT CHANGE UP
-  GENTAMICIN: SIGNIFICANT CHANGE UP
-  NITROFURANTOIN: SIGNIFICANT CHANGE UP
-  PIPERACILLIN/TAZOBACTAM: SIGNIFICANT CHANGE UP
-  TOBRAMYCIN: SIGNIFICANT CHANGE UP
-  TRIMETHOPRIM/SULFAMETHOXAZOLE: SIGNIFICANT CHANGE UP
CULTURE RESULTS: SIGNIFICANT CHANGE UP
METHOD TYPE: SIGNIFICANT CHANGE UP
ORGANISM # SPEC MICROSCOPIC CNT: SIGNIFICANT CHANGE UP
ORGANISM # SPEC MICROSCOPIC CNT: SIGNIFICANT CHANGE UP
SPECIMEN SOURCE: SIGNIFICANT CHANGE UP

## 2018-11-13 NOTE — ED ADULT NURSE NOTE - PRIMARY CARE PROVIDER
Pt is having a MRI on Monday and would like to have something to help calm her down. [facility] [address] [place] [address] [address] AT [facility]  [address], [phone], [phone]     Pt also needs an WILFREDO order placed. unk

## 2018-11-15 ENCOUNTER — EMERGENCY (EMERGENCY)
Facility: HOSPITAL | Age: 38
LOS: 1 days | Discharge: HOME | End: 2018-11-15
Attending: EMERGENCY MEDICINE | Admitting: EMERGENCY MEDICINE
Payer: COMMERCIAL

## 2018-11-15 VITALS
RESPIRATION RATE: 18 BRPM | TEMPERATURE: 98 F | WEIGHT: 223.99 LBS | HEART RATE: 80 BPM | OXYGEN SATURATION: 99 % | DIASTOLIC BLOOD PRESSURE: 87 MMHG | SYSTOLIC BLOOD PRESSURE: 155 MMHG

## 2018-11-15 VITALS
RESPIRATION RATE: 18 BRPM | HEART RATE: 84 BPM | OXYGEN SATURATION: 99 % | TEMPERATURE: 99 F | SYSTOLIC BLOOD PRESSURE: 135 MMHG | DIASTOLIC BLOOD PRESSURE: 86 MMHG

## 2018-11-15 DIAGNOSIS — Z79.2 LONG TERM (CURRENT) USE OF ANTIBIOTICS: ICD-10-CM

## 2018-11-15 DIAGNOSIS — E78.5 HYPERLIPIDEMIA, UNSPECIFIED: ICD-10-CM

## 2018-11-15 DIAGNOSIS — Z98.89 OTHER SPECIFIED POSTPROCEDURAL STATES: Chronic | ICD-10-CM

## 2018-11-15 DIAGNOSIS — Z98.890 OTHER SPECIFIED POSTPROCEDURAL STATES: Chronic | ICD-10-CM

## 2018-11-15 DIAGNOSIS — O21.9 VOMITING OF PREGNANCY, UNSPECIFIED: ICD-10-CM

## 2018-11-15 DIAGNOSIS — Z91.013 ALLERGY TO SEAFOOD: ICD-10-CM

## 2018-11-15 DIAGNOSIS — Z79.899 OTHER LONG TERM (CURRENT) DRUG THERAPY: ICD-10-CM

## 2018-11-15 DIAGNOSIS — E11.9 TYPE 2 DIABETES MELLITUS WITHOUT COMPLICATIONS: ICD-10-CM

## 2018-11-15 DIAGNOSIS — Z79.891 LONG TERM (CURRENT) USE OF OPIATE ANALGESIC: ICD-10-CM

## 2018-11-15 DIAGNOSIS — Z3A.18 18 WEEKS GESTATION OF PREGNANCY: ICD-10-CM

## 2018-11-15 DIAGNOSIS — I10 ESSENTIAL (PRIMARY) HYPERTENSION: ICD-10-CM

## 2018-11-15 DIAGNOSIS — B37.3 CANDIDIASIS OF VULVA AND VAGINA: ICD-10-CM

## 2018-11-15 DIAGNOSIS — Z88.0 ALLERGY STATUS TO PENICILLIN: ICD-10-CM

## 2018-11-15 DIAGNOSIS — Z88.1 ALLERGY STATUS TO OTHER ANTIBIOTIC AGENTS STATUS: ICD-10-CM

## 2018-11-15 LAB — EXTRA SST TUBE: SIGNIFICANT CHANGE UP

## 2018-11-15 PROCEDURE — 83690 ASSAY OF LIPASE: CPT

## 2018-11-15 PROCEDURE — 36415 COLL VENOUS BLD VENIPUNCTURE: CPT

## 2018-11-15 PROCEDURE — 87086 URINE CULTURE/COLONY COUNT: CPT

## 2018-11-15 PROCEDURE — 82962 GLUCOSE BLOOD TEST: CPT

## 2018-11-15 PROCEDURE — 99284 EMERGENCY DEPT VISIT MOD MDM: CPT

## 2018-11-15 PROCEDURE — 85025 COMPLETE CBC W/AUTO DIFF WBC: CPT

## 2018-11-15 PROCEDURE — 99283 EMERGENCY DEPT VISIT LOW MDM: CPT | Mod: 25

## 2018-11-15 PROCEDURE — 80053 COMPREHEN METABOLIC PANEL: CPT

## 2018-11-15 RX ORDER — METOCLOPRAMIDE HCL 10 MG
10 TABLET ORAL ONCE
Qty: 0 | Refills: 0 | Status: COMPLETED | OUTPATIENT
Start: 2018-11-15 | End: 2018-11-15

## 2018-11-15 RX ORDER — DOXYLAMINE SUCCINATE AND PYRIDOXINE HYDROCHLORIDE, DELAYED RELEASE TABLETS 10 MG/10 MG 10; 10 MG/1; MG/1
2 TABLET, DELAYED RELEASE ORAL
Qty: 60 | Refills: 0 | OUTPATIENT
Start: 2018-11-15 | End: 2018-12-14

## 2018-11-15 RX ORDER — SODIUM CHLORIDE 9 MG/ML
1000 INJECTION INTRAMUSCULAR; INTRAVENOUS; SUBCUTANEOUS ONCE
Qty: 0 | Refills: 0 | Status: COMPLETED | OUTPATIENT
Start: 2018-11-15 | End: 2018-11-15

## 2018-11-15 RX ORDER — MICONAZOLE NITRATE 2 %
1 CREAM (GRAM) TOPICAL
Qty: 1 | Refills: 0 | OUTPATIENT
Start: 2018-11-15 | End: 2018-11-21

## 2018-11-15 RX ADMIN — SODIUM CHLORIDE 2000 MILLILITER(S): 9 INJECTION INTRAMUSCULAR; INTRAVENOUS; SUBCUTANEOUS at 16:56

## 2018-11-15 RX ADMIN — Medication 10 MILLIGRAM(S): at 17:22

## 2018-11-15 NOTE — ED PROVIDER NOTE - ATTENDING CONTRIBUTION TO CARE
18 wks pregnant without prenatal care.  concerned about multiple things: itchy vaginal discharge after antibiotic - pelvic with white discharge/ vaginal irritation consistent with candida- will treat with monistat. also with nausea and vomiting in pregnancy- appears well hydrated.  given dose of reglan.  will treat with diclegis.  also concerned about dehiscence of her lower abdominal scar- scar intact on exam.  referral to obgyn for prenatal care made by care coordinator.  fht 157 on exam, no vaginal bleeding or abdominal pain.

## 2018-11-15 NOTE — ED PROVIDER NOTE - PHYSICAL EXAMINATION
Constitutional: WDWN resting comfortably in bed; NAD, nontoxic, appearing anxious  Head: NC/AT  Eyes: PERRL, EOMI, anicteric sclera  ENT: no nasal discharge; uvula midline, no oropharyngeal erythema or exudates; MMM  Neck: supple; no JVD or thyromegaly  Respiratory: CTA B/L; no W/R/R, no retractions  Cardiac: +S1/S2; RRR; ?flow murmur RUSB  Gastrointestinal: soft, NT/ND; no rebound or guarding; +BSx4, horizontal well-healed incision scar noted under pannus, no open wound, bleeding, or oozing   Genitourinary: normal external genitalia  Back: spine midline, no bony tenderness or step-offs; no CVAT B/L  Extremities: WWP, no clubbing or cyanosis; no peripheral edema  Musculoskeletal: NROM x4; no joint swelling, tenderness or erythema  Vascular: 2+ radial, femoral, DP/PT pulses B/L  Dermatologic: skin warm, dry and intact; no rashes, wounds, or scars  Lymphatic: no submandibular or cervical LAD  Neurologic: AAOx3; CNII-XII grossly intact; no focal deficits  Psychiatric: affect and characteristics of appearance, verbalizations, behaviors are appropriate Constitutional: WDWN resting comfortably in bed; NAD, nontoxic, appearing anxious  Head: NC/AT  Eyes: PERRL, EOMI, anicteric sclera  ENT: no nasal discharge; uvula midline, no oropharyngeal erythema or exudates; MMM  Neck: supple; no JVD or thyromegaly  Respiratory: CTA B/L; no W/R/R, no retractions  Cardiac: +S1/S2; RRR; ?flow murmur RUSB, palpation of trapezium muscle reproduces tenderness and discomfort that pt reports as pin/needles down her arms on either side  Gastrointestinal: soft, NT/ND; no rebound or guarding; +BSx4, horizontal well-healed incision scar noted under pannus with intact skin, no open wound, bleeding, or oozing   Genitourinary: labia and internal vaginal canal with inflammation and erythema, scant white discharge, no malodor   Back: spine midline, no bony tenderness or step-offs; no CVAT B/L  Extremities: WWP, no clubbing or cyanosis; no peripheral edema  Musculoskeletal: NROM x4; no joint swelling, tenderness or erythema  Vascular: 2+ radial, femoral, DP/PT pulses B/L  Lymphatic: no submandibular or cervical LAD  Neurologic: AAOx3; CNII-XII grossly intact; no focal deficits  Psychiatric: anxious

## 2018-11-15 NOTE — ED ADULT NURSE NOTE - CHIEF COMPLAINT QUOTE
patient is 18 weeks pregagnt  complains of nausea vomiting and urinary symptoms. patient is diabetic

## 2018-11-15 NOTE — ED PROVIDER NOTE - OBJECTIVE STATEMENT
38F  and currently 18 wks pregnant with PMHx DMII, HTN, HLD, abdominal hernias s/p numerous mesh repairs, cervical dysplasia presents to ED for concerns of worsening abdominal incision site from hernia repair and vaginal pruritis. Of note, pt recently went to ED 10/23 for c/o intractable n/v and found to have UTI treated with x7 days macrobid. Pt noted after completing her ABX that she had pressure with urinating and noted a "cream-colored" discharge when she wiped. No bleeding, abdominal pain, burning with urination, fevers/chills. She is also concerned that the increased pressure for her episodes of vomiting may have opened the wound from the hernia repair. Denies bleeding or puss from incision site. Follows with Dr. Sol from gen surgery for her hernias. Pt does not have OB/GYN f/u for her pregnancy.

## 2018-11-15 NOTE — ED PROVIDER NOTE - MEDICAL DECISION MAKING DETAILS
pt presenting for concerns of abdominal wound; on exam, skin intact, well-healed; no S&S infection or dehiscence. also c/o pruritis with cream/white discharge s/p x7 day course macrobid several weeks ago for UTI in ED. On exam with inflammation, erythema, and scant white discharge of labia and vaginal tissue. UA negative. Likely vaginal candidiasis. Will give Rx for fluconazole cream x7 days and f/u OB/GYN. outpatient.    Of note, hyponatremic in ED given h/o n/v associated with pregnancy and decreased PO intake. s/p 1L NS.

## 2018-11-15 NOTE — ED ADULT NURSE NOTE - OBJECTIVE STATEMENT
patient is 18 weeks pregnant with c.o nausea vomiting and left lower back pain radiating to abd for 3 days and "pressure when urinating". denies any fever/chills, vaginal bleeding.

## 2018-11-16 LAB
CULTURE RESULTS: NO GROWTH — SIGNIFICANT CHANGE UP
SPECIMEN SOURCE: SIGNIFICANT CHANGE UP

## 2018-11-21 NOTE — ED ADULT TRIAGE NOTE - NS ED TRIAGE AVPU SCALE
Patient had left shoulder surgery 11-15-18    He was going to do physical therapy at [facility], but has decided to do it at [facility] now    Please send orders to them please       Alert-The patient is alert, awake and responds to voice. The patient is oriented to time, place, and person. The triage nurse is able to obtain subjective information.

## 2018-11-23 ENCOUNTER — EMERGENCY (EMERGENCY)
Facility: HOSPITAL | Age: 38
LOS: 1 days | Discharge: HOME | End: 2018-11-23
Admitting: EMERGENCY MEDICINE
Payer: COMMERCIAL

## 2018-11-23 VITALS
SYSTOLIC BLOOD PRESSURE: 136 MMHG | OXYGEN SATURATION: 99 % | WEIGHT: 225.09 LBS | DIASTOLIC BLOOD PRESSURE: 87 MMHG | RESPIRATION RATE: 20 BRPM | TEMPERATURE: 98 F | HEIGHT: 67 IN | HEART RATE: 83 BPM

## 2018-11-23 DIAGNOSIS — Z98.890 OTHER SPECIFIED POSTPROCEDURAL STATES: Chronic | ICD-10-CM

## 2018-11-23 DIAGNOSIS — Z98.89 OTHER SPECIFIED POSTPROCEDURAL STATES: Chronic | ICD-10-CM

## 2018-11-23 PROCEDURE — 99283 EMERGENCY DEPT VISIT LOW MDM: CPT

## 2018-11-23 RX ORDER — OXYCODONE AND ACETAMINOPHEN 5; 325 MG/1; MG/1
1 TABLET ORAL ONCE
Qty: 0 | Refills: 0 | Status: DISCONTINUED | OUTPATIENT
Start: 2018-11-23 | End: 2018-11-23

## 2018-11-23 RX ADMIN — OXYCODONE AND ACETAMINOPHEN 1 TABLET(S): 5; 325 TABLET ORAL at 17:42

## 2018-11-23 NOTE — ED ADULT NURSE NOTE - NSIMPLEMENTINTERV_GEN_ALL_ED
Implemented All Universal Safety Interventions:  [place] to call system. Call bell, personal items and telephone within reach. Instruct patient to call for assistance. Room bathroom lighting operational. Non-slip footwear when patient is off stretcher. Physically safe environment: no spills, clutter or unnecessary equipment. Stretcher in lowest position, wheels locked, appropriate side rails in place.

## 2018-11-23 NOTE — ED ADULT NURSE NOTE - OBJECTIVE STATEMENT
Pt a&ox3, complains of L tooth pain. Pt complains of 8/10 pain that started last night. speaking appropriately, airway patent complains of discomfort when speaking. unable to eat. Pt. is nauseous from pain. resting in chair

## 2018-11-23 NOTE — ED PROVIDER NOTE - PHYSICAL EXAMINATION
CONSTITUTIONAL: pt uncomfortable appearing   HEAD: Normocephalic; atraumatic.   EYES: PERRL; EOM intact; conjunctiva and sclera clear  ENT: normal nose; no rhinorrhea; normal pharynx with no erythema or lesions. L upper 2nd and 3rd molar ttp, no gum swelling or fluctuance   NECK: Supple; non-tender;   CARDIOVASCULAR: Normal S1, S2; no murmurs, rubs, or gallops. Regular rate and rhythm.   RESPIRATORY: Breathing easily; breath sounds clear and equal bilaterally; no wheezes, rhonchi, or rales.  MSK: FROM at all extremities, normal tone   EXT: No cyanosis or edema; N/V intact  SKIN: Normal for age and race; warm; dry; good turgor; no apparent lesions or rash.

## 2018-11-23 NOTE — ED PROVIDER NOTE - MEDICAL DECISION MAKING DETAILS
37 yo F  @ 19 weeks gestation with pmh of HTN, DM, HLD, hernia repairs c/o severe L upper toothache since yesterday. Pt unable to eat. Pt taking tylenol and motrin with no relief. Denies fever, chills, swelling. Afebrile. L upper 2nd and 3rd molar ttp, no gum swelling or fluctuance. Pt advised to not take motrin in pregnancy as it is not safe. Discussed use of percocet and pt wants to try percocet despite category c, pt states she had it earlier in her pregnancy. Advised to f/u with dermatology.

## 2018-11-23 NOTE — ED PROVIDER NOTE - OBJECTIVE STATEMENT
39 yo F  @ 19 weeks gestation with pmh of HTN, DM, HLD, hernia repairs c/o severe L upper toothache since yesterday. Pt unable to eat. Pt taking tylenol and motrin with no relief. Denies fever, chills, swelling.

## 2018-11-27 DIAGNOSIS — Z79.1 LONG TERM (CURRENT) USE OF NON-STEROIDAL ANTI-INFLAMMATORIES (NSAID): ICD-10-CM

## 2018-11-27 DIAGNOSIS — O99.612 DISEASES OF THE DIGESTIVE SYSTEM COMPLICATING PREGNANCY, SECOND TRIMESTER: ICD-10-CM

## 2018-11-27 DIAGNOSIS — Z88.1 ALLERGY STATUS TO OTHER ANTIBIOTIC AGENTS STATUS: ICD-10-CM

## 2018-11-27 DIAGNOSIS — K08.89 OTHER SPECIFIED DISORDERS OF TEETH AND SUPPORTING STRUCTURES: ICD-10-CM

## 2018-11-27 DIAGNOSIS — Z91.013 ALLERGY TO SEAFOOD: ICD-10-CM

## 2018-11-27 DIAGNOSIS — I10 ESSENTIAL (PRIMARY) HYPERTENSION: ICD-10-CM

## 2018-11-27 DIAGNOSIS — Z88.0 ALLERGY STATUS TO PENICILLIN: ICD-10-CM

## 2018-11-27 DIAGNOSIS — Z3A.19 19 WEEKS GESTATION OF PREGNANCY: ICD-10-CM

## 2018-11-27 DIAGNOSIS — E11.9 TYPE 2 DIABETES MELLITUS WITHOUT COMPLICATIONS: ICD-10-CM

## 2018-11-27 DIAGNOSIS — Z79.891 LONG TERM (CURRENT) USE OF OPIATE ANALGESIC: ICD-10-CM

## 2018-11-27 DIAGNOSIS — Z79.2 LONG TERM (CURRENT) USE OF ANTIBIOTICS: ICD-10-CM

## 2018-11-27 DIAGNOSIS — E78.5 HYPERLIPIDEMIA, UNSPECIFIED: ICD-10-CM

## 2019-01-08 ENCOUNTER — OUTPATIENT (OUTPATIENT)
Dept: EMERGENCY DEPT | Facility: HOSPITAL | Age: 39
LOS: 1 days | Discharge: HOME | End: 2019-01-08
Payer: COMMERCIAL

## 2019-01-08 VITALS
DIASTOLIC BLOOD PRESSURE: 76 MMHG | HEART RATE: 96 BPM | RESPIRATION RATE: 20 BRPM | WEIGHT: 228.4 LBS | HEIGHT: 62 IN | OXYGEN SATURATION: 99 % | SYSTOLIC BLOOD PRESSURE: 126 MMHG | TEMPERATURE: 98 F

## 2019-01-08 DIAGNOSIS — Z98.890 OTHER SPECIFIED POSTPROCEDURAL STATES: Chronic | ICD-10-CM

## 2019-01-08 DIAGNOSIS — Z98.89 OTHER SPECIFIED POSTPROCEDURAL STATES: Chronic | ICD-10-CM

## 2019-01-08 LAB
ALBUMIN SERPL ELPH-MCNC: 3.4 G/DL — SIGNIFICANT CHANGE UP (ref 3.3–5)
ALP SERPL-CCNC: 75 U/L — SIGNIFICANT CHANGE UP (ref 40–120)
ALT FLD-CCNC: 8 U/L — LOW (ref 10–45)
ANION GAP SERPL CALC-SCNC: 14 MMOL/L — SIGNIFICANT CHANGE UP (ref 5–17)
APTT BLD: 29.5 SEC — SIGNIFICANT CHANGE UP (ref 27.5–36.3)
AST SERPL-CCNC: 11 U/L — SIGNIFICANT CHANGE UP (ref 10–40)
BASOPHILS NFR BLD AUTO: 0.1 % — SIGNIFICANT CHANGE UP (ref 0–2)
BILIRUB SERPL-MCNC: <0.2 MG/DL — SIGNIFICANT CHANGE UP (ref 0.2–1.2)
BUN SERPL-MCNC: 4 MG/DL — LOW (ref 7–23)
CALCIUM SERPL-MCNC: 8.6 MG/DL — SIGNIFICANT CHANGE UP (ref 8.4–10.5)
CHLORIDE SERPL-SCNC: 98 MMOL/L — SIGNIFICANT CHANGE UP (ref 96–108)
CO2 SERPL-SCNC: 24 MMOL/L — SIGNIFICANT CHANGE UP (ref 22–31)
CREAT SERPL-MCNC: 0.55 MG/DL — SIGNIFICANT CHANGE UP (ref 0.5–1.3)
EOSINOPHIL NFR BLD AUTO: 1 % — SIGNIFICANT CHANGE UP (ref 0–6)
FIBRINOGEN PPP-MCNC: 648 MG/DL — HIGH (ref 258–438)
GLUCOSE BLDC GLUCOMTR-MCNC: 92 MG/DL — SIGNIFICANT CHANGE UP (ref 70–99)
GLUCOSE SERPL-MCNC: 116 MG/DL — HIGH (ref 70–99)
HCT VFR BLD CALC: 36 % — SIGNIFICANT CHANGE UP (ref 34.5–45)
HGB BLD-MCNC: 11.9 G/DL — SIGNIFICANT CHANGE UP (ref 11.5–15.5)
INR BLD: 1.04 — SIGNIFICANT CHANGE UP (ref 0.88–1.16)
LDH SERPL L TO P-CCNC: 177 U/L — SIGNIFICANT CHANGE UP (ref 50–242)
LYMPHOCYTES # BLD AUTO: 23.6 % — SIGNIFICANT CHANGE UP (ref 13–44)
MCHC RBC-ENTMCNC: 28.3 PG — SIGNIFICANT CHANGE UP (ref 27–34)
MCHC RBC-ENTMCNC: 33.1 G/DL — SIGNIFICANT CHANGE UP (ref 32–36)
MCV RBC AUTO: 85.7 FL — SIGNIFICANT CHANGE UP (ref 80–100)
MONOCYTES NFR BLD AUTO: 7.6 % — SIGNIFICANT CHANGE UP (ref 2–14)
NEUTROPHILS NFR BLD AUTO: 67.7 % — SIGNIFICANT CHANGE UP (ref 43–77)
PLATELET # BLD AUTO: 297 K/UL — SIGNIFICANT CHANGE UP (ref 150–400)
POTASSIUM SERPL-MCNC: 3 MMOL/L — LOW (ref 3.5–5.3)
POTASSIUM SERPL-SCNC: 3 MMOL/L — LOW (ref 3.5–5.3)
PROT SERPL-MCNC: 7.2 G/DL — SIGNIFICANT CHANGE UP (ref 6–8.3)
PROTHROM AB SERPL-ACNC: 11.8 SEC — SIGNIFICANT CHANGE UP (ref 10–12.9)
RBC # BLD: 4.2 M/UL — SIGNIFICANT CHANGE UP (ref 3.8–5.2)
RBC # FLD: 15.3 % — SIGNIFICANT CHANGE UP (ref 10.3–16.9)
SODIUM SERPL-SCNC: 136 MMOL/L — SIGNIFICANT CHANGE UP (ref 135–145)
URATE SERPL-MCNC: 3.3 MG/DL — SIGNIFICANT CHANGE UP (ref 2.5–7)
WBC # BLD: 15.1 K/UL — HIGH (ref 3.8–10.5)
WBC # FLD AUTO: 15.1 K/UL — HIGH (ref 3.8–10.5)

## 2019-01-08 PROCEDURE — 82962 GLUCOSE BLOOD TEST: CPT

## 2019-01-08 PROCEDURE — 80053 COMPREHEN METABOLIC PANEL: CPT

## 2019-01-08 PROCEDURE — 85025 COMPLETE CBC W/AUTO DIFF WBC: CPT

## 2019-01-08 PROCEDURE — 36415 COLL VENOUS BLD VENIPUNCTURE: CPT

## 2019-01-08 PROCEDURE — 99285 EMERGENCY DEPT VISIT HI MDM: CPT

## 2019-01-08 PROCEDURE — 83615 LACTATE (LD) (LDH) ENZYME: CPT

## 2019-01-08 PROCEDURE — 85384 FIBRINOGEN ACTIVITY: CPT

## 2019-01-08 PROCEDURE — 85610 PROTHROMBIN TIME: CPT

## 2019-01-08 PROCEDURE — 84550 ASSAY OF BLOOD/URIC ACID: CPT

## 2019-01-08 PROCEDURE — 84156 ASSAY OF PROTEIN URINE: CPT

## 2019-01-08 PROCEDURE — 85730 THROMBOPLASTIN TIME PARTIAL: CPT

## 2019-01-08 PROCEDURE — 82570 ASSAY OF URINE CREATININE: CPT

## 2019-01-08 RX ADMIN — Medication 30 MILLILITER(S): at 22:00

## 2019-01-08 NOTE — ED PROVIDER NOTE - MEDICAL DECISION MAKING DETAILS
pt in ED w concern for abd pain s/p mechanical fall today.  Patient w TTP over LLQ of abdomen without rebound, rigidity or guarding.  + TTP over left lumbar paraspinal musculature.  I spoke w Marylin, ob resident who accepts patient to L&D at this time.  Patient aware of plan and will be taken to L&D for further eval.

## 2019-01-08 NOTE — ED ADULT TRIAGE NOTE - CHIEF COMPLAINT QUOTE
Patient 26 weeks pregnant , s/p slipped and fall in the bathroom c/o lower back pain , left hip pain and pressure on lower abdomen . Denies any loc . No vaginal bleeding .

## 2019-01-08 NOTE — ED PROVIDER NOTE - OBJECTIVE STATEMENT
38 year old female at 26 weeks gestation 38 year old female at 26 weeks gestation presents to ED with concern for left sided abdominal discomfort status post mechanical fall today.  Patient states she landed onto her side.  She denies hit to head, loss of consciousness, midline cervical, thoracic or lumbar spine pain/tenderness/stepoff/deformity, vaginal bleeding.  She has established her obstetrical care with [facility] and notes uncomplicated pregnancy to date.

## 2019-01-08 NOTE — ED PROVIDER NOTE - CARE PLAN
Principal Discharge DX:	Abdominal pain during pregnancy in second trimester  Secondary Diagnosis:	Fall, initial encounter

## 2019-01-08 NOTE — ED PROVIDER NOTE - MUSCULOSKELETAL, MLM
Spine appears normal, there is no midline cervical, thoracic or lumbar spine pain/tenderness/stepoff/deformity.  range of motion is not limited, + TTP over left lumbar paraspinal musculature.

## 2019-01-09 LAB
CREAT ?TM UR-MCNC: 267 MG/DL — SIGNIFICANT CHANGE UP
PROT ?TM UR-MCNC: 45 MG/DL — HIGH (ref 0–12)
PROT/CREAT UR-RTO: 0.2 RATIO — SIGNIFICANT CHANGE UP (ref 0–0.2)

## 2019-01-14 VITALS
SYSTOLIC BLOOD PRESSURE: 136 MMHG | OXYGEN SATURATION: 96 % | TEMPERATURE: 99 F | RESPIRATION RATE: 18 BRPM | HEART RATE: 129 BPM | DIASTOLIC BLOOD PRESSURE: 80 MMHG

## 2019-01-14 DIAGNOSIS — O26.899 OTHER SPECIFIED PREGNANCY RELATED CONDITIONS, UNSPECIFIED TRIMESTER: ICD-10-CM

## 2019-01-14 DIAGNOSIS — Z3A.00 WEEKS OF GESTATION OF PREGNANCY NOT SPECIFIED: ICD-10-CM

## 2019-01-14 LAB
ALBUMIN SERPL ELPH-MCNC: 3.8 G/DL — SIGNIFICANT CHANGE UP (ref 3.3–5)
ALP SERPL-CCNC: 86 U/L — SIGNIFICANT CHANGE UP (ref 40–120)
ALT FLD-CCNC: 17 U/L — SIGNIFICANT CHANGE UP (ref 10–45)
ANION GAP SERPL CALC-SCNC: 23 MMOL/L — HIGH (ref 5–17)
AST SERPL-CCNC: 23 U/L — SIGNIFICANT CHANGE UP (ref 10–40)
BASE EXCESS BLDV CALC-SCNC: 1.7 MMOL/L — SIGNIFICANT CHANGE UP
BASOPHILS NFR BLD AUTO: 0.1 % — SIGNIFICANT CHANGE UP (ref 0–2)
BILIRUB SERPL-MCNC: 0.2 MG/DL — SIGNIFICANT CHANGE UP (ref 0.2–1.2)
BLD GP AB SCN SERPL QL: NEGATIVE — SIGNIFICANT CHANGE UP
BUN SERPL-MCNC: 4 MG/DL — LOW (ref 7–23)
CA-I SERPL-SCNC: 1 MMOL/L — LOW (ref 1.12–1.3)
CALCIUM SERPL-MCNC: 9.1 MG/DL — SIGNIFICANT CHANGE UP (ref 8.4–10.5)
CHLORIDE SERPL-SCNC: 92 MMOL/L — LOW (ref 96–108)
CK MB CFR SERPL CALC: 3 NG/ML — SIGNIFICANT CHANGE UP (ref 0–6.7)
CK SERPL-CCNC: 188 U/L — HIGH (ref 25–170)
CO2 SERPL-SCNC: 20 MMOL/L — LOW (ref 22–31)
CREAT SERPL-MCNC: 0.62 MG/DL — SIGNIFICANT CHANGE UP (ref 0.5–1.3)
D DIMER BLD IA.RAPID-MCNC: 830 NG/ML DDU — HIGH
FLUAV SUBTYP SPEC NAA+PROBE: DETECTED
GAS PNL BLDV: 135 MMOL/L — LOW (ref 138–146)
GAS PNL BLDV: SIGNIFICANT CHANGE UP
GLUCOSE SERPL-MCNC: 142 MG/DL — HIGH (ref 70–99)
HCO3 BLDV-SCNC: 24 MMOL/L — SIGNIFICANT CHANGE UP (ref 20–27)
HCT VFR BLD CALC: 39.6 % — SIGNIFICANT CHANGE UP (ref 34.5–45)
HGB BLD-MCNC: 13.8 G/DL — SIGNIFICANT CHANGE UP (ref 11.5–15.5)
LACTATE SERPL-SCNC: 2.5 MMOL/L — HIGH (ref 0.5–2)
LYMPHOCYTES # BLD AUTO: 9 % — LOW (ref 13–44)
MCHC RBC-ENTMCNC: 28.8 PG — SIGNIFICANT CHANGE UP (ref 27–34)
MCHC RBC-ENTMCNC: 34.8 G/DL — SIGNIFICANT CHANGE UP (ref 32–36)
MCV RBC AUTO: 82.5 FL — SIGNIFICANT CHANGE UP (ref 80–100)
MONOCYTES NFR BLD AUTO: 11.1 % — SIGNIFICANT CHANGE UP (ref 2–14)
NEUTROPHILS NFR BLD AUTO: 79.8 % — HIGH (ref 43–77)
NT-PROBNP SERPL-SCNC: 136 PG/ML — SIGNIFICANT CHANGE UP (ref 0–300)
PCO2 BLDV: 29 MMHG — LOW (ref 41–51)
PH BLDV: 7.52 — HIGH (ref 7.32–7.43)
PLATELET # BLD AUTO: 292 K/UL — SIGNIFICANT CHANGE UP (ref 150–400)
PO2 BLDV: 24 MMHG — SIGNIFICANT CHANGE UP
POTASSIUM BLDV-SCNC: 2.1 MMOL/L — CRITICAL LOW (ref 3.5–4.9)
POTASSIUM SERPL-MCNC: 2.6 MMOL/L — CRITICAL LOW (ref 3.5–5.3)
POTASSIUM SERPL-SCNC: 2.6 MMOL/L — CRITICAL LOW (ref 3.5–5.3)
PROT SERPL-MCNC: 8.2 G/DL — SIGNIFICANT CHANGE UP (ref 6–8.3)
RAPID RVP RESULT: DETECTED
RBC # BLD: 4.8 M/UL — SIGNIFICANT CHANGE UP (ref 3.8–5.2)
RBC # FLD: 14.9 % — SIGNIFICANT CHANGE UP (ref 10.3–16.9)
RH IG SCN BLD-IMP: NEGATIVE — SIGNIFICANT CHANGE UP
SAO2 % BLDV: 52 % — SIGNIFICANT CHANGE UP
SODIUM SERPL-SCNC: 135 MMOL/L — SIGNIFICANT CHANGE UP (ref 135–145)
TROPONIN T SERPL-MCNC: <0.01 NG/ML — SIGNIFICANT CHANGE UP (ref 0–0.01)
WBC # BLD: 13.1 K/UL — HIGH (ref 3.8–10.5)
WBC # FLD AUTO: 13.1 K/UL — HIGH (ref 3.8–10.5)

## 2019-01-14 PROCEDURE — 71045 X-RAY EXAM CHEST 1 VIEW: CPT | Mod: 26

## 2019-01-14 PROCEDURE — 71275 CT ANGIOGRAPHY CHEST: CPT | Mod: 26

## 2019-01-14 RX ORDER — MORPHINE SULFATE 50 MG/1
4 CAPSULE, EXTENDED RELEASE ORAL ONCE
Qty: 0 | Refills: 0 | Status: DISCONTINUED | OUTPATIENT
Start: 2019-01-14 | End: 2019-01-14

## 2019-01-14 RX ORDER — SODIUM CHLORIDE 9 MG/ML
1000 INJECTION INTRAMUSCULAR; INTRAVENOUS; SUBCUTANEOUS ONCE
Qty: 0 | Refills: 0 | Status: COMPLETED | OUTPATIENT
Start: 2019-01-14 | End: 2019-01-14

## 2019-01-14 RX ORDER — FAMOTIDINE 10 MG/ML
20 INJECTION INTRAVENOUS ONCE
Qty: 0 | Refills: 0 | Status: COMPLETED | OUTPATIENT
Start: 2019-01-14 | End: 2019-01-14

## 2019-01-14 RX ORDER — AZITHROMYCIN 500 MG/1
500 TABLET, FILM COATED ORAL ONCE
Qty: 0 | Refills: 0 | Status: COMPLETED | OUTPATIENT
Start: 2019-01-14 | End: 2019-01-14

## 2019-01-14 RX ORDER — MAGNESIUM SULFATE 500 MG/ML
2 VIAL (ML) INJECTION ONCE
Qty: 0 | Refills: 0 | Status: COMPLETED | OUTPATIENT
Start: 2019-01-14 | End: 2019-01-14

## 2019-01-14 RX ORDER — DIPHENHYDRAMINE HCL 50 MG
50 CAPSULE ORAL ONCE
Qty: 0 | Refills: 0 | Status: COMPLETED | OUTPATIENT
Start: 2019-01-14 | End: 2019-01-14

## 2019-01-14 RX ORDER — AZTREONAM 2 G
1000 VIAL (EA) INJECTION ONCE
Qty: 0 | Refills: 0 | Status: COMPLETED | OUTPATIENT
Start: 2019-01-14 | End: 2019-01-15

## 2019-01-14 RX ORDER — HYDROCORTISONE 20 MG
200 TABLET ORAL ONCE
Qty: 0 | Refills: 0 | Status: COMPLETED | OUTPATIENT
Start: 2019-01-14 | End: 2019-01-14

## 2019-01-14 RX ORDER — POTASSIUM CHLORIDE 20 MEQ
10 PACKET (EA) ORAL
Qty: 0 | Refills: 0 | Status: COMPLETED | OUTPATIENT
Start: 2019-01-14 | End: 2019-01-15

## 2019-01-14 RX ORDER — ONDANSETRON 8 MG/1
4 TABLET, FILM COATED ORAL ONCE
Qty: 0 | Refills: 0 | Status: COMPLETED | OUTPATIENT
Start: 2019-01-14 | End: 2019-01-14

## 2019-01-14 RX ORDER — IPRATROPIUM/ALBUTEROL SULFATE 18-103MCG
3 AEROSOL WITH ADAPTER (GRAM) INHALATION ONCE
Qty: 0 | Refills: 0 | Status: COMPLETED | OUTPATIENT
Start: 2019-01-14 | End: 2019-01-14

## 2019-01-14 RX ORDER — POTASSIUM CHLORIDE 20 MEQ
40 PACKET (EA) ORAL
Qty: 0 | Refills: 0 | Status: DISCONTINUED | OUTPATIENT
Start: 2019-01-14 | End: 2019-01-15

## 2019-01-14 RX ORDER — SODIUM CHLORIDE 9 MG/ML
1000 INJECTION INTRAMUSCULAR; INTRAVENOUS; SUBCUTANEOUS
Qty: 0 | Refills: 0 | Status: DISCONTINUED | OUTPATIENT
Start: 2019-01-14 | End: 2019-01-17

## 2019-01-14 RX ADMIN — Medication 200 MILLIGRAM(S): at 22:15

## 2019-01-14 RX ADMIN — Medication 75 MILLIGRAM(S): at 23:14

## 2019-01-14 RX ADMIN — ONDANSETRON 4 MILLIGRAM(S): 8 TABLET, FILM COATED ORAL at 21:32

## 2019-01-14 RX ADMIN — FAMOTIDINE 20 MILLIGRAM(S): 10 INJECTION INTRAVENOUS at 21:32

## 2019-01-14 RX ADMIN — MORPHINE SULFATE 4 MILLIGRAM(S): 50 CAPSULE, EXTENDED RELEASE ORAL at 22:22

## 2019-01-14 RX ADMIN — Medication 50 GRAM(S): at 21:24

## 2019-01-14 RX ADMIN — SODIUM CHLORIDE 1000 MILLILITER(S): 9 INJECTION INTRAMUSCULAR; INTRAVENOUS; SUBCUTANEOUS at 22:24

## 2019-01-14 RX ADMIN — MORPHINE SULFATE 4 MILLIGRAM(S): 50 CAPSULE, EXTENDED RELEASE ORAL at 22:16

## 2019-01-14 RX ADMIN — SODIUM CHLORIDE 1000 MILLILITER(S): 9 INJECTION INTRAMUSCULAR; INTRAVENOUS; SUBCUTANEOUS at 22:15

## 2019-01-14 RX ADMIN — Medication 3 MILLILITER(S): at 21:24

## 2019-01-14 RX ADMIN — AZITHROMYCIN 500 MILLIGRAM(S): 500 TABLET, FILM COATED ORAL at 23:16

## 2019-01-14 RX ADMIN — Medication 2 GRAM(S): at 22:24

## 2019-01-14 RX ADMIN — Medication 50 MILLIGRAM(S): at 22:16

## 2019-01-14 RX ADMIN — Medication 20 MILLIEQUIVALENT(S): at 23:13

## 2019-01-14 RX ADMIN — MORPHINE SULFATE 4 MILLIGRAM(S): 50 CAPSULE, EXTENDED RELEASE ORAL at 21:31

## 2019-01-14 RX ADMIN — Medication 125 MILLIGRAM(S): at 21:24

## 2019-01-14 RX ADMIN — SODIUM CHLORIDE 1000 MILLILITER(S): 9 INJECTION INTRAMUSCULAR; INTRAVENOUS; SUBCUTANEOUS at 23:17

## 2019-01-14 RX ADMIN — SODIUM CHLORIDE 1000 MILLILITER(S): 9 INJECTION INTRAMUSCULAR; INTRAVENOUS; SUBCUTANEOUS at 21:24

## 2019-01-14 RX ADMIN — AZITHROMYCIN 255 MILLIGRAM(S): 500 TABLET, FILM COATED ORAL at 22:16

## 2019-01-14 RX ADMIN — MORPHINE SULFATE 4 MILLIGRAM(S): 50 CAPSULE, EXTENDED RELEASE ORAL at 23:12

## 2019-01-14 NOTE — ED PROVIDER NOTE - ENMT, MLM
Airway patent, Nasal mucosa clear. Mouth with dry  mucosa. Throat has no vesicles, no oropharyngeal exudates and uvula is midline.

## 2019-01-14 NOTE — ED PROVIDER NOTE - MEDICAL DECISION MAKING DETAILS
39 yo F   28 weeks preg  with xiphisternal epigastric pain sob and chest pain tach  elev d dimer  hx of asthma  ? asthma exac  will r/o PE  also possible LLL infilt

## 2019-01-14 NOTE — ED PROVIDER NOTE - OBJECTIVE STATEMENT
39 yo F  G 13 P2  28 weeks preg  with fall 5 days ago on left side was seen in ED  and monitored on L and D - no hx of rib fx with fall  now with 2 days of sob  left sided CP at rest  pleuritic--also  N/V x 3 episodes ? fevers  no chills ? flu shot this last year  no diarrhea  no vag bleeding or spotting 37 yo F  G 13 P2  hx of preeclampsia- 28 weeks preg hx of DM HTN HLD obesity  with fall 5 days ago on left side was seen in ED  and monitored on L and D - no hx of rib fx with fall  now with 2 days of sob  left sided CP at rest  pleuritic--also  N/V x 3 episodes ? fevers  no chills ? flu shot this last year  no diarrhea  no vag bleeding or spotting 37 yo F  G 13 P2  hx of preeclampsia- 28 weeks preg hx of DM HTN HLD obesity  with fall 5 days ago on left side was seen in ED  and monitored on L and D - no hx of rib fx with fall  now with 2 days of sob  left sided CP / epigastric  pain  CP at rest also  pleuritic--also  N/V x 3 episodes ? fevers  no chills ? flu shot this last year  no diarrhea  no vag bleeding or spotting

## 2019-01-14 NOTE — ED PROVIDER NOTE - CARE PLAN
Principal Discharge DX:	Pneumonia  Secondary Diagnosis:	Pregnancy  Secondary Diagnosis:	Status asthmaticus  Secondary Diagnosis:	Fall Principal Discharge DX:	Pneumonia  Secondary Diagnosis:	Pregnancy  Secondary Diagnosis:	Status asthmaticus  Secondary Diagnosis:	Fall  Secondary Diagnosis:	Influenza A Principal Discharge DX:	Pneumonia  Secondary Diagnosis:	Pregnancy  Secondary Diagnosis:	Status asthmaticus  Secondary Diagnosis:	Fall  Secondary Diagnosis:	Influenza A  Secondary Diagnosis:	Hypokalemia Principal Discharge DX:	Pneumonia  Secondary Diagnosis:	Pregnancy  Secondary Diagnosis:	Status asthmaticus  Secondary Diagnosis:	Fall  Secondary Diagnosis:	Influenza A  Secondary Diagnosis:	Hypokalemia  Secondary Diagnosis:	Epigastric pain

## 2019-01-14 NOTE — CONSULT NOTE ADULT - ASSESSMENT
37yo asthma, DM, migraines, b/l breast lumps, [id number] at 27wks by second trimester US presenting with shortness of breath and chest pain that started yesterday, found to be influenza + in ED. ICU consulted with concern for chest pain and tachycardia which markedly improved by time of evaluation. Patient was likely in severe sepsis on arrival 2/2 flu, now improved. With K that was also repleted > 3, patient is stable for admission to a non-telemetry floor service. Please reconsult as needed.

## 2019-01-14 NOTE — ED ADULT TRIAGE NOTE - CHIEF COMPLAINT QUOTE
27 weeks pregnant, difficulty breathing, chest hurts, abdominal pain, ribs pain, fever since yesterday

## 2019-01-14 NOTE — CONSULT NOTE ADULT - ASSESSMENT
39yo [id number] at 27wks  by second trimester US presenting with shortness of breath and chest pain  - cardiac enzymes negative, RVP Flu A positive  - f/u CT angio to r/o PE -- will also assess for any abdominal cause of her pain including hernia d/t h/o prior bowel incarceration  - from OB perspective -- OB team to follow BP and labs to r/o superimposed preeclampsia on patient's existing chronic HTN; will be done inpatient while patient is admitted as well as outpatient after patient is discharged; currently not spilling protein in urine, will continue to assess patient for signs or symptoms of severe preeclampsia which would warrant IV magnesium for seizure prophylaxis  - after stabilization of patient, if symptoms such as chest pain and headache do not resolve, will monitor patient q 6 hours with labs for evidence of preeclampsia   - will do q shift fetal assessment with biophysical profile  - patient to follow up with Dr. Sharif with high risk OB upon discharge    D/W Dr. Kelsy Villarreal

## 2019-01-14 NOTE — ED PROVIDER NOTE - PROGRESS NOTE DETAILS
OB at bedside -- pos FHT  FHT  160w  no obv abruption  will eval  for admission to L and D - vs  MICU OB at bedside -- pos FHT  FHT  160  no obv abruption-- hx of preeclampsia  elev BP  150/60 ? pain from fall will medicate -OB  will eval  for admission to L and D monitored bed - vs  MICU -- spoke with MICU  will eval pt in ED bedside  cardiac echo  no obv pericardial effusion  no obv RV strain bedside  cardiac echo  no obv pericardial effusion  no obv RV strain    influenza A pos +  will rx with tamiflu Pt back from CT, limited study but neg for PE. Patient with improved but still present upper abdominal pain and chest pain. Resp status greatly improved. VS improved. lactate improved. Repeat K sent. Abd soft, nonsurgical, no incarcerated hernias palpated. ICU and obgyn were both down to see the patient and after discussing jointly they decided ICU or 7lach care not indicated at this time and pt will be admitted to ob. Surgery was consulted and will follow given pt's hx of mult hernia repairs and c/o persistent abd pain.

## 2019-01-14 NOTE — ED ADULT NURSE REASSESSMENT NOTE - NS ED NURSE REASSESS COMMENT FT1
neb. tx complete, chris. well, awaiting CTA, spouse remains at bedside, assessment on-going, awaiting OB eval.

## 2019-01-14 NOTE — ED ADULT NURSE NOTE - OBJECTIVE STATEMENT
pt. is 26 weeks gestation with c/o fever/cough/sob/generalized body aches since yesterday, denies vaginal d/c pt. is 27 weeks gestation, G13, P2,  with c/o fever/cough/sob/generalized body aches/abdominal cramping since yesterday, denies vaginal d/c

## 2019-01-15 ENCOUNTER — INPATIENT (INPATIENT)
Facility: HOSPITAL | Age: 39
LOS: 4 days | Discharge: HOME | DRG: 832 | End: 2019-01-20
Attending: OBSTETRICS & GYNECOLOGY | Admitting: OBSTETRICS & GYNECOLOGY
Payer: COMMERCIAL

## 2019-01-15 DIAGNOSIS — Z98.890 OTHER SPECIFIED POSTPROCEDURAL STATES: Chronic | ICD-10-CM

## 2019-01-15 DIAGNOSIS — Z98.89 OTHER SPECIFIED POSTPROCEDURAL STATES: Chronic | ICD-10-CM

## 2019-01-15 LAB
ALBUMIN SERPL ELPH-MCNC: 3.3 G/DL — SIGNIFICANT CHANGE UP (ref 3.3–5)
ALP SERPL-CCNC: 77 U/L — SIGNIFICANT CHANGE UP (ref 40–120)
ALT FLD-CCNC: 13 U/L — SIGNIFICANT CHANGE UP (ref 10–45)
ANION GAP SERPL CALC-SCNC: 15 MMOL/L — SIGNIFICANT CHANGE UP (ref 5–17)
ANION GAP SERPL CALC-SCNC: 17 MMOL/L — SIGNIFICANT CHANGE UP (ref 5–17)
APPEARANCE UR: CLEAR — SIGNIFICANT CHANGE UP
AST SERPL-CCNC: 17 U/L — SIGNIFICANT CHANGE UP (ref 10–40)
BACTERIA # UR AUTO: PRESENT /HPF
BILIRUB SERPL-MCNC: <0.2 MG/DL — SIGNIFICANT CHANGE UP (ref 0.2–1.2)
BILIRUB UR-MCNC: NEGATIVE — SIGNIFICANT CHANGE UP
BUN SERPL-MCNC: 3 MG/DL — LOW (ref 7–23)
BUN SERPL-MCNC: 4 MG/DL — LOW (ref 7–23)
CALCIUM SERPL-MCNC: 7.5 MG/DL — LOW (ref 8.4–10.5)
CALCIUM SERPL-MCNC: 8.2 MG/DL — LOW (ref 8.4–10.5)
CHLORIDE SERPL-SCNC: 100 MMOL/L — SIGNIFICANT CHANGE UP (ref 96–108)
CHLORIDE SERPL-SCNC: 101 MMOL/L — SIGNIFICANT CHANGE UP (ref 96–108)
CO2 SERPL-SCNC: 19 MMOL/L — LOW (ref 22–31)
CO2 SERPL-SCNC: 20 MMOL/L — LOW (ref 22–31)
COLOR SPEC: YELLOW — SIGNIFICANT CHANGE UP
CREAT SERPL-MCNC: 0.54 MG/DL — SIGNIFICANT CHANGE UP (ref 0.5–1.3)
CREAT SERPL-MCNC: 0.56 MG/DL — SIGNIFICANT CHANGE UP (ref 0.5–1.3)
DIFF PNL FLD: NEGATIVE — SIGNIFICANT CHANGE UP
EPI CELLS # UR: SIGNIFICANT CHANGE UP /HPF (ref 0–5)
GLUCOSE BLDC GLUCOMTR-MCNC: 110 MG/DL — HIGH (ref 70–99)
GLUCOSE BLDC GLUCOMTR-MCNC: 120 MG/DL — HIGH (ref 70–99)
GLUCOSE BLDC GLUCOMTR-MCNC: 153 MG/DL — HIGH (ref 70–99)
GLUCOSE BLDC GLUCOMTR-MCNC: 236 MG/DL — HIGH (ref 70–99)
GLUCOSE SERPL-MCNC: 231 MG/DL — HIGH (ref 70–99)
GLUCOSE SERPL-MCNC: 270 MG/DL — HIGH (ref 70–99)
GLUCOSE UR QL: NEGATIVE — SIGNIFICANT CHANGE UP
HCT VFR BLD CALC: 34.9 % — SIGNIFICANT CHANGE UP (ref 34.5–45)
HGB BLD-MCNC: 11.6 G/DL — SIGNIFICANT CHANGE UP (ref 11.5–15.5)
KETONES UR-MCNC: 40 MG/DL
LACTATE SERPL-SCNC: 1.5 MMOL/L — SIGNIFICANT CHANGE UP (ref 0.5–2)
LEUKOCYTE ESTERASE UR-ACNC: NEGATIVE — SIGNIFICANT CHANGE UP
MAGNESIUM SERPL-MCNC: 2.2 MG/DL — SIGNIFICANT CHANGE UP (ref 1.6–2.6)
MCHC RBC-ENTMCNC: 27.9 PG — SIGNIFICANT CHANGE UP (ref 27–34)
MCHC RBC-ENTMCNC: 33.2 G/DL — SIGNIFICANT CHANGE UP (ref 32–36)
MCV RBC AUTO: 83.9 FL — SIGNIFICANT CHANGE UP (ref 80–100)
NITRITE UR-MCNC: POSITIVE
PH UR: 6.5 — SIGNIFICANT CHANGE UP (ref 5–8)
PHOSPHATE SERPL-MCNC: 2.4 MG/DL — LOW (ref 2.5–4.5)
PLATELET # BLD AUTO: 252 K/UL — SIGNIFICANT CHANGE UP (ref 150–400)
POTASSIUM SERPL-MCNC: 3.1 MMOL/L — LOW (ref 3.5–5.3)
POTASSIUM SERPL-MCNC: 3.9 MMOL/L — SIGNIFICANT CHANGE UP (ref 3.5–5.3)
POTASSIUM SERPL-SCNC: 3.1 MMOL/L — LOW (ref 3.5–5.3)
POTASSIUM SERPL-SCNC: 3.9 MMOL/L — SIGNIFICANT CHANGE UP (ref 3.5–5.3)
PROT SERPL-MCNC: 6.5 G/DL — SIGNIFICANT CHANGE UP (ref 6–8.3)
PROT UR-MCNC: NEGATIVE MG/DL — SIGNIFICANT CHANGE UP
RBC # BLD: 4.16 M/UL — SIGNIFICANT CHANGE UP (ref 3.8–5.2)
RBC # FLD: 15.3 % — SIGNIFICANT CHANGE UP (ref 10.3–16.9)
RBC CASTS # UR COMP ASSIST: < 5 /HPF — SIGNIFICANT CHANGE UP
SODIUM SERPL-SCNC: 135 MMOL/L — SIGNIFICANT CHANGE UP (ref 135–145)
SODIUM SERPL-SCNC: 137 MMOL/L — SIGNIFICANT CHANGE UP (ref 135–145)
SP GR SPEC: 1.01 — SIGNIFICANT CHANGE UP (ref 1–1.03)
TROPONIN T SERPL-MCNC: <0.01 NG/ML — SIGNIFICANT CHANGE UP (ref 0–0.01)
URATE SERPL-MCNC: 4.9 MG/DL — SIGNIFICANT CHANGE UP (ref 2.5–7)
UROBILINOGEN FLD QL: 0.2 E.U./DL — SIGNIFICANT CHANGE UP
WBC # BLD: 10.3 K/UL — SIGNIFICANT CHANGE UP (ref 3.8–10.5)
WBC # FLD AUTO: 10.3 K/UL — SIGNIFICANT CHANGE UP (ref 3.8–10.5)
WBC UR QL: < 5 /HPF — SIGNIFICANT CHANGE UP

## 2019-01-15 PROCEDURE — 99291 CRITICAL CARE FIRST HOUR: CPT

## 2019-01-15 PROCEDURE — 93970 EXTREMITY STUDY: CPT | Mod: 26

## 2019-01-15 PROCEDURE — 93010 ELECTROCARDIOGRAM REPORT: CPT

## 2019-01-15 PROCEDURE — 76705 ECHO EXAM OF ABDOMEN: CPT | Mod: 26

## 2019-01-15 PROCEDURE — 99222 1ST HOSP IP/OBS MODERATE 55: CPT

## 2019-01-15 RX ORDER — DEXTROSE 50 % IN WATER 50 %
25 SYRINGE (ML) INTRAVENOUS ONCE
Qty: 0 | Refills: 0 | Status: DISCONTINUED | OUTPATIENT
Start: 2019-01-15 | End: 2019-01-17

## 2019-01-15 RX ORDER — IPRATROPIUM/ALBUTEROL SULFATE 18-103MCG
3 AEROSOL WITH ADAPTER (GRAM) INHALATION EVERY 6 HOURS
Qty: 0 | Refills: 0 | Status: DISCONTINUED | OUTPATIENT
Start: 2019-01-15 | End: 2019-01-20

## 2019-01-15 RX ORDER — ACETAMINOPHEN 500 MG
1000 TABLET ORAL ONCE
Qty: 0 | Refills: 0 | Status: DISCONTINUED | OUTPATIENT
Start: 2019-01-15 | End: 2019-01-15

## 2019-01-15 RX ORDER — ACETAMINOPHEN 500 MG
1000 TABLET ORAL ONCE
Qty: 0 | Refills: 0 | Status: COMPLETED | OUTPATIENT
Start: 2019-01-15 | End: 2019-01-15

## 2019-01-15 RX ORDER — METOCLOPRAMIDE HCL 10 MG
10 TABLET ORAL ONCE
Qty: 0 | Refills: 0 | Status: COMPLETED | OUTPATIENT
Start: 2019-01-15 | End: 2019-01-15

## 2019-01-15 RX ORDER — DEXTROSE 50 % IN WATER 50 %
15 SYRINGE (ML) INTRAVENOUS ONCE
Qty: 0 | Refills: 0 | Status: DISCONTINUED | OUTPATIENT
Start: 2019-01-15 | End: 2019-01-17

## 2019-01-15 RX ORDER — METOCLOPRAMIDE HCL 10 MG
5 TABLET ORAL EVERY 6 HOURS
Qty: 0 | Refills: 0 | Status: DISCONTINUED | OUTPATIENT
Start: 2019-01-15 | End: 2019-01-17

## 2019-01-15 RX ORDER — DEXTROSE 50 % IN WATER 50 %
12.5 SYRINGE (ML) INTRAVENOUS ONCE
Qty: 0 | Refills: 0 | Status: DISCONTINUED | OUTPATIENT
Start: 2019-01-15 | End: 2019-01-20

## 2019-01-15 RX ORDER — ASPIRIN/CALCIUM CARB/MAGNESIUM 324 MG
81 TABLET ORAL DAILY
Qty: 0 | Refills: 0 | Status: DISCONTINUED | OUTPATIENT
Start: 2019-01-15 | End: 2019-01-20

## 2019-01-15 RX ORDER — DEXTROSE 50 % IN WATER 50 %
25 SYRINGE (ML) INTRAVENOUS ONCE
Qty: 0 | Refills: 0 | Status: DISCONTINUED | OUTPATIENT
Start: 2019-01-15 | End: 2019-01-20

## 2019-01-15 RX ORDER — SODIUM CHLORIDE 9 MG/ML
1000 INJECTION, SOLUTION INTRAVENOUS
Qty: 0 | Refills: 0 | Status: DISCONTINUED | OUTPATIENT
Start: 2019-01-15 | End: 2019-01-17

## 2019-01-15 RX ORDER — ALBUTEROL 90 UG/1
2 AEROSOL, METERED ORAL EVERY 6 HOURS
Qty: 0 | Refills: 0 | Status: DISCONTINUED | OUTPATIENT
Start: 2019-01-15 | End: 2019-01-17

## 2019-01-15 RX ORDER — GLUCAGON INJECTION, SOLUTION 0.5 MG/.1ML
1 INJECTION, SOLUTION SUBCUTANEOUS ONCE
Qty: 0 | Refills: 0 | Status: DISCONTINUED | OUTPATIENT
Start: 2019-01-15 | End: 2019-01-17

## 2019-01-15 RX ORDER — POTASSIUM CHLORIDE 20 MEQ
40 PACKET (EA) ORAL ONCE
Qty: 0 | Refills: 0 | Status: COMPLETED | OUTPATIENT
Start: 2019-01-15 | End: 2019-01-15

## 2019-01-15 RX ORDER — INSULIN LISPRO 100/ML
VIAL (ML) SUBCUTANEOUS
Qty: 0 | Refills: 0 | Status: DISCONTINUED | OUTPATIENT
Start: 2019-01-15 | End: 2019-01-20

## 2019-01-15 RX ORDER — FAMOTIDINE 10 MG/ML
20 INJECTION INTRAVENOUS ONCE
Qty: 0 | Refills: 0 | Status: COMPLETED | OUTPATIENT
Start: 2019-01-15 | End: 2019-01-15

## 2019-01-15 RX ORDER — NITROFURANTOIN MACROCRYSTAL 50 MG
100 CAPSULE ORAL
Qty: 0 | Refills: 0 | Status: DISCONTINUED | OUTPATIENT
Start: 2019-01-15 | End: 2019-01-20

## 2019-01-15 RX ADMIN — Medication 1000 MILLIGRAM(S): at 22:29

## 2019-01-15 RX ADMIN — Medication 400 MILLIGRAM(S): at 04:11

## 2019-01-15 RX ADMIN — Medication 1000 MILLIGRAM(S): at 01:14

## 2019-01-15 RX ADMIN — Medication 10 MILLIGRAM(S): at 12:14

## 2019-01-15 RX ADMIN — Medication 100 MILLIEQUIVALENT(S): at 01:35

## 2019-01-15 RX ADMIN — Medication 1 TABLET(S): at 11:53

## 2019-01-15 RX ADMIN — Medication 100 MILLIEQUIVALENT(S): at 00:10

## 2019-01-15 RX ADMIN — Medication 1000 MILLIGRAM(S): at 12:33

## 2019-01-15 RX ADMIN — Medication 100 MILLIGRAM(S): at 22:03

## 2019-01-15 RX ADMIN — Medication 3 MILLILITER(S): at 19:15

## 2019-01-15 RX ADMIN — SODIUM CHLORIDE 125 MILLILITER(S): 9 INJECTION INTRAMUSCULAR; INTRAVENOUS; SUBCUTANEOUS at 00:10

## 2019-01-15 RX ADMIN — FAMOTIDINE 20 MILLIGRAM(S): 10 INJECTION INTRAVENOUS at 15:15

## 2019-01-15 RX ADMIN — Medication 400 MILLIGRAM(S): at 12:14

## 2019-01-15 RX ADMIN — Medication 75 MILLIGRAM(S): at 18:04

## 2019-01-15 RX ADMIN — Medication 40 MILLIEQUIVALENT(S): at 00:36

## 2019-01-15 RX ADMIN — Medication 1000 MILLIGRAM(S): at 04:52

## 2019-01-15 RX ADMIN — Medication 1000 MILLIGRAM(S): at 22:02

## 2019-01-15 RX ADMIN — Medication 5 MILLIGRAM(S): at 18:32

## 2019-01-15 RX ADMIN — Medication 10 MILLIEQUIVALENT(S): at 01:10

## 2019-01-15 RX ADMIN — Medication: at 09:15

## 2019-01-15 RX ADMIN — Medication 81 MILLIGRAM(S): at 11:53

## 2019-01-15 RX ADMIN — Medication 100 MILLIEQUIVALENT(S): at 02:45

## 2019-01-15 RX ADMIN — Medication 50 MILLIGRAM(S): at 00:14

## 2019-01-15 RX ADMIN — Medication 75 MILLIGRAM(S): at 11:53

## 2019-01-15 RX ADMIN — Medication 2: at 14:43

## 2019-01-15 RX ADMIN — Medication 3 MILLILITER(S): at 00:10

## 2019-01-15 NOTE — H&P ADULT - ASSESSMENT
Assessment and Recommendation:   · Assessment		  39yo [id number] at 27wks  by second trimester US presenting with shortness of breath and chest pain  - cardiac enzymes negative, RVP Flu A positive  - CT angio neg  - admit to OB for monitoring   - treat flu

## 2019-01-15 NOTE — ED ADULT NURSE REASSESSMENT NOTE - NS ED NURSE REASSESS COMMENT FT1
additional labs obtained and sent, chris. Dr. Carisa dominguez at bedside discussing current POC, awaiting admission

## 2019-01-15 NOTE — PROGRESS NOTE ADULT - SUBJECTIVE AND OBJECTIVE BOX
SUBJECTIVE: Patient examined bedside. Patient complains of epigastric pain that radiates to to her RUQ. Patient reports nausea improved with antiemetics. Patient denies SOB and chest pain.      aspirin enteric coated 81 milliGRAM(s) Oral daily  oseltamivir 75 milliGRAM(s) Oral two times a day      Vital Signs Last 24 Hrs  T(C): 37.1 (15 Gene 2019 01:45), Max: 37.1 (2019 20:57)  T(F): 98.8 (15 Gene 2019 01:45), Max: 98.8 (15 Gene 2019 01:45)  HR: 102 (15 Gene 2019 01:45) (102 - 129)  BP: 126/68 (15 Gene 2019 01:45) (100/54 - 151/87)  BP(mean): --  RR: 17 (15 Gene 2019 01:45) (16 - 20)  SpO2: 95% (15 Gene 2019 01:45) (95% - 99%)  I&O's Detail    2019 07:01  -  15 Gene 2019 07:00  --------------------------------------------------------  IN:    IV PiggyBack: 50 mL  Total IN: 50 mL    OUT:  Total OUT: 0 mL    Total NET: 50 mL          General: NAD, resting comfortably in bed  C/V: NSR  Pulm: Nonlabored breathing, no respiratory distress  Abd: soft, tender >epigastric area and RUQ. no peritoneal sign, no rebound, no guarding, gravid uterus. Previous scars are intact, no discharge.  Extrem: WWP, bilateral pitting pedal edema       LABS:                        11.6   10.3  )-----------( 252      ( 15 Gene 2019 06:01 )             34.9     01-15    137  |  100  |  3<L>  ----------------------------<  270<H>  3.9   |  20<L>  |  0.54    Ca    7.5<L>      15 Gene 2019 06:01  Phos  2.4     01-15  Mg     2.2     -15    TPro  6.5  /  Alb  3.3  /  TBili  <0.2  /  DBili  x   /  AST  17  /  ALT  13  /  AlkPhos  77  01-15    PT/INR - ( 15 Gene 2019 01:13 )   PT: 12.6 sec;   INR: 1.11          PTT - ( 15 Gene 2019 01:13 )  PTT:28.4 sec  Urinalysis Basic - ( 15 Gene 2019 01:50 )    Color: Yellow / Appearance: Clear / S.010 / pH: x  Gluc: x / Ketone: 40 mg/dL  / Bili: Negative / Urobili: 0.2 E.U./dL   Blood: x / Protein: NEGATIVE mg/dL / Nitrite: POSITIVE   Leuk Esterase: NEGATIVE / RBC: < 5 /HPF / WBC < 5 /HPF   Sq Epi: x / Non Sq Epi: 0-5 /HPF / Bacteria: Present /HPF        RADIOLOGY & ADDITIONAL STUDIES: SUBJECTIVE: Patient examined bedside. Patient  reports epigastric pain that radiates  to her RUQi s resolving . Patient reports nausea improved with antiemetics. Patient denies SOB and chest pain.      aspirin enteric coated 81 milliGRAM(s) Oral daily  oseltamivir 75 milliGRAM(s) Oral two times a day      Vital Signs Last 24 Hrs  T(C): 37.1 (15 Gene 2019 01:45), Max: 37.1 (2019 20:57)  T(F): 98.8 (15 Gene 2019 01:45), Max: 98.8 (15 Gene 2019 01:45)  HR: 102 (15 Gene 2019 01:45) (102 - 129)  BP: 126/68 (15 Gene 2019 01:45) (100/54 - 151/87)  BP(mean): --  RR: 17 (15 Gene 2019 01:45) (16 - 20)  SpO2: 95% (15 Gene 2019 01:45) (95% - 99%)  I&O's Detail    2019 07:01  -  15 Gene 2019 07:00  --------------------------------------------------------  IN:    IV PiggyBack: 50 mL  Total IN: 50 mL    OUT:  Total OUT: 0 mL    Total NET: 50 mL          General: NAD, resting comfortably in bed  C/V: NSR  Pulm: Nonlabored breathing, no respiratory distress  Abd: soft, tender >epigastric area and RUQ. no peritoneal sign, no rebound, no guarding, gravid uterus. Previous scars are intact, no discharge.  Extrem: WWP, bilateral pitting pedal edema       LABS:                        11.6   10.3  )-----------( 252      ( 15 Gene 2019 06:01 )             34.9     01-15    137  |  100  |  3<L>  ----------------------------<  270<H>  3.9   |  20<L>  |  0.54    Ca    7.5<L>      15 Gene 2019 06:01  Phos  2.4     01-15  Mg     2.2     -15    TPro  6.5  /  Alb  3.3  /  TBili  <0.2  /  DBili  x   /  AST  17  /  ALT  13  /  AlkPhos  77  01-15    PT/INR - ( 15 Gene 2019 01:13 )   PT: 12.6 sec;   INR: 1.11          PTT - ( 15 Gene 2019 01:13 )  PTT:28.4 sec  Urinalysis Basic - ( 15 Gene 2019 01:50 )    Color: Yellow / Appearance: Clear / S.010 / pH: x  Gluc: x / Ketone: 40 mg/dL  / Bili: Negative / Urobili: 0.2 E.U./dL   Blood: x / Protein: NEGATIVE mg/dL / Nitrite: POSITIVE   Leuk Esterase: NEGATIVE / RBC: < 5 /HPF / WBC < 5 /HPF   Sq Epi: x / Non Sq Epi: 0-5 /HPF / Bacteria: Present /HPF        RADIOLOGY & ADDITIONAL STUDIES:

## 2019-01-15 NOTE — ED ADULT NURSE REASSESSMENT NOTE - NS ED NURSE REASSESS COMMENT FT1
pt. in US as noted, will re-enter pt. t/p when complete, pt and spouse aware, with understanding verbalized

## 2019-01-15 NOTE — ED ADULT NURSE REASSESSMENT NOTE - NS ED NURSE REASSESS COMMENT FT1
repeat lactate was obtained and sent, vs as noted, awaiting further orders, utd on poc, with understanding verbalized

## 2019-01-15 NOTE — CONSULT NOTE ADULT - ASSESSMENT
This is a 37 yo  POG 28 with pre-eclampsia admitted to OBGyn service for pre-eclampsia and flu management (Influenza positive), CTPE protocol was done TRO PE and negative, surgery was consulted for epigastric and RUQ pain - TRO any acute abdominal pathology.     Currently, patient is afebrile, tachycardic with BP hypertensive to 150 systolic. +leukocytosis of 13, metabolic alkalosis with hypokalemia, and lactic acidosis (resolved) possibly 2/2 N/V and dehydration.     Recommendations : This is a 39 yo  POG 28 with pre-eclampsia admitted to OBGyn service for pre-eclampsia and flu management (Influenza positive), CTPE protocol was done TRO PE and negative, surgery was consulted for epigastric and RUQ pain - TRO any acute abdominal pathology.     Currently, patient is afebrile, tachycardic with BP hypertensive to 150 systolic. +leukocytosis of 13, metabolic alkalosis with hypokalemia, and lactic acidosis (resolved) possibly 2/2 N/V and dehydration.     Recommendations :  RUQ US TRO any biliary pathology  The rest of care as per primary team  Team 2C will follow    case d/w chief

## 2019-01-15 NOTE — PROGRESS NOTE ADULT - ASSESSMENT
This is a 39 yo  POG 28 with pre-eclampsia admitted to OBGyn service for pre-eclampsia and flu management (Influenza positive), CTPE protocol was done TRO PE and negative, surgery was consulted for epigastric and RUQ pain - TRO any acute abdominal pathology.  US :No evidence of cholelithiasis or acute cholecystitis. No evidence of   biliary obstruction.Hepatic steatosis with mild hepatomegaly.     Currently, patient is afebrile, tachycardic with BP  126/68     Recommendations :    The rest of care as per primary team  Team 2C will follow    case d/w chief This is a 39 yo  POG 28 with pre-eclampsia admitted to OBGyn service for pre-eclampsia and flu management (Influenza positive), CTPE protocol was done TRO PE and negative, surgery was consulted for epigastric and RUQ pain - TRO any acute abdominal pathology.  US :No evidence of cholelithiasis or acute cholecystitis. No evidence of   biliary obstruction.Hepatic steatosis with mild hepatomegaly.     Currently, patient is afebrile, tachycardic with BP  126/68     Recommendations :       Surgery is signing off US shows no evidence of cholelithiasis , acute cholecystitis and biliary obstruction. Patient can follow up with Dr. Hurtado in the future fo any surgical needs     case d/w chief and Dr. Hurtado

## 2019-01-16 DIAGNOSIS — J45.41 MODERATE PERSISTENT ASTHMA WITH (ACUTE) EXACERBATION: ICD-10-CM

## 2019-01-16 DIAGNOSIS — J10.1 INFLUENZA DUE TO OTHER IDENTIFIED INFLUENZA VIRUS WITH OTHER RESPIRATORY MANIFESTATIONS: ICD-10-CM

## 2019-01-16 LAB
COLLECT DURATION TIME UR: 24 HR — SIGNIFICANT CHANGE UP
CULTURE RESULTS: SIGNIFICANT CHANGE UP
GLUCOSE BLDC GLUCOMTR-MCNC: 116 MG/DL — HIGH (ref 70–99)
GLUCOSE BLDC GLUCOMTR-MCNC: 99 MG/DL — SIGNIFICANT CHANGE UP (ref 70–99)
PROT 24H UR-MRATE: 392 MG/24 H — HIGH (ref 50–100)
SPECIMEN SOURCE: SIGNIFICANT CHANGE UP
TOTAL VOLUME - 24 HOUR: 800 ML — SIGNIFICANT CHANGE UP
URINE CREATININE CALCULATION: 1.3 G/24 H — SIGNIFICANT CHANGE UP (ref 0.8–1.8)

## 2019-01-16 PROCEDURE — 99233 SBSQ HOSP IP/OBS HIGH 50: CPT | Mod: GC

## 2019-01-16 PROCEDURE — 93010 ELECTROCARDIOGRAM REPORT: CPT

## 2019-01-16 RX ORDER — ACETAMINOPHEN 500 MG
1000 TABLET ORAL ONCE
Qty: 0 | Refills: 0 | Status: COMPLETED | OUTPATIENT
Start: 2019-01-16 | End: 2019-01-16

## 2019-01-16 RX ORDER — BUDESONIDE, MICRONIZED 100 %
0.25 POWDER (GRAM) MISCELLANEOUS EVERY 12 HOURS
Qty: 0 | Refills: 0 | Status: DISCONTINUED | OUTPATIENT
Start: 2019-01-16 | End: 2019-01-20

## 2019-01-16 RX ORDER — DIPHENHYDRAMINE HCL 50 MG
50 CAPSULE ORAL ONCE
Qty: 0 | Refills: 0 | Status: COMPLETED | OUTPATIENT
Start: 2019-01-16 | End: 2019-01-16

## 2019-01-16 RX ADMIN — Medication 100 MILLIGRAM(S): at 23:16

## 2019-01-16 RX ADMIN — Medication 5 MILLIGRAM(S): at 17:00

## 2019-01-16 RX ADMIN — Medication 1000 MILLIGRAM(S): at 22:00

## 2019-01-16 RX ADMIN — Medication 3 MILLILITER(S): at 07:10

## 2019-01-16 RX ADMIN — Medication 1000 MILLIGRAM(S): at 21:00

## 2019-01-16 RX ADMIN — Medication 3 MILLILITER(S): at 14:30

## 2019-01-16 RX ADMIN — Medication 75 MILLIGRAM(S): at 07:09

## 2019-01-16 RX ADMIN — Medication 5 MILLIGRAM(S): at 23:09

## 2019-01-16 RX ADMIN — Medication 75 MILLIGRAM(S): at 17:47

## 2019-01-16 RX ADMIN — SODIUM CHLORIDE 125 MILLILITER(S): 9 INJECTION INTRAMUSCULAR; INTRAVENOUS; SUBCUTANEOUS at 09:24

## 2019-01-16 RX ADMIN — Medication 3 MILLILITER(S): at 00:44

## 2019-01-16 RX ADMIN — Medication 81 MILLIGRAM(S): at 11:49

## 2019-01-16 RX ADMIN — Medication 1 TABLET(S): at 11:49

## 2019-01-16 RX ADMIN — Medication 40 MILLIGRAM(S): at 16:58

## 2019-01-16 RX ADMIN — Medication 100 MILLIGRAM(S): at 10:25

## 2019-01-16 RX ADMIN — Medication 50 MILLIGRAM(S): at 01:07

## 2019-01-16 RX ADMIN — Medication 5 MILLIGRAM(S): at 09:24

## 2019-01-16 RX ADMIN — Medication 0.25 MILLIGRAM(S): at 19:14

## 2019-01-16 NOTE — CONSULT NOTE ADULT - ATTENDING COMMENTS
This patient is a 37 y/o female  in gestational age 28 wks who has not been feeling well, had sick contact.  In the ED she was given Zithromax, aztreonam, steroids, zofran, tamiflu, potassium benadryl and morphine.  her potassium was 2.6 and influenza screen ws positive.  In the ED she was in bed, with abdominal disress due to pain, she later stood up and walked to the bathroom on her own, was a/o x3 and breathing room air, VSS.  CTA showed no PE.  -abdominal pain  -influ (+)  >IVF  >[ain control  >replete the potassium, f/u magnesium and phosphate  This patient is hemodynamically stable and does not need the ICU or stepdown,  This was d/w the OB/gyn resident.
Patient seen and examined with house-staff during bedside rounds.  Resident note read, including vitals, physical findings, laboratory data, and radiological reports.   Revisions included below.  Direct personal management at bed side and extensive interpretation of the data.  Plan was outlined and discussed in details with the housestaff.  Decision making of high complexity  Action taken for acute disease activity to reflect the level of care provided:  - medication reconciliation  - review laboratory data  The patient is well known to me. Continue bronchodilators. Start inhaled steroids and continue to taper oral steroids
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
## 2019-01-16 NOTE — CONSULT NOTE ADULT - PROBLEM SELECTOR RECOMMENDATION 9
given h/o sx pt w/ likely moderate persistent asthma now in exacerbation 2/2 influenza A s/p recent steroid tx for exacerbation 2wks ago also w/ multiple allergies and triggers  -peak flow at 57% of baseline, would continue to monitor in hospital  -duonebs q4h  -budesonide 0.25mg nebs bid  -prednisone 40mg qd for 5d course  -should have OP pulm f/u as pt w/ persistent asthma and may need allergy control w/ LTRA or mast cell stabilizers in future if not responding to ICS  -encourage tobacco cessation maintenance  -avoid triggers given h/o sx pt w/ likely moderate persistent asthma now in exacerbation 2/2 influenza A s/p recent steroid tx for exacerbation 2wks ago also w/ multiple allergies and triggers  -peak flow at 57% of baseline, would continue to monitor in hospital  -duonebs q4h  -budesonide 0.25mg nebs bid  -prednisone 40mg qd for 5d course  -should have OP pulm f/u as pt w/ persistent asthma and may need allergy control w/ LTRA or mast cell stabilizers in future if not responding to ICS  -encourage tobacco cessation maintenance  -avoid triggers  -Pt can be discharged with a pulmicort flexhaler to help prevent further exacerbations. 360mcg twice daily.

## 2019-01-16 NOTE — CONSULT NOTE ADULT - ASSESSMENT
38F  28wks pregnant PMH HTN, DM2, moderate persistent asthma, migraines, L breast lump presented to ED for chest discomfort found to have Influenza A admitted to OBGYN for monitoring pulm consulted for asthma exacerbation

## 2019-01-17 LAB
BLD GP AB SCN SERPL QL: NEGATIVE — SIGNIFICANT CHANGE UP
GLUCOSE BLDC GLUCOMTR-MCNC: 105 MG/DL — HIGH (ref 70–99)
GLUCOSE BLDC GLUCOMTR-MCNC: 189 MG/DL — HIGH (ref 70–99)
RH IG SCN BLD-IMP: NEGATIVE — SIGNIFICANT CHANGE UP

## 2019-01-17 PROCEDURE — 99232 SBSQ HOSP IP/OBS MODERATE 35: CPT | Mod: GC

## 2019-01-17 RX ORDER — METOCLOPRAMIDE HCL 10 MG
10 TABLET ORAL EVERY 6 HOURS
Qty: 0 | Refills: 0 | Status: DISCONTINUED | OUTPATIENT
Start: 2019-01-17 | End: 2019-01-17

## 2019-01-17 RX ORDER — NIFEDIPINE 30 MG
30 TABLET, EXTENDED RELEASE 24 HR ORAL DAILY
Qty: 0 | Refills: 0 | Status: DISCONTINUED | OUTPATIENT
Start: 2019-01-17 | End: 2019-01-19

## 2019-01-17 RX ORDER — LABETALOL HCL 100 MG
200 TABLET ORAL EVERY 12 HOURS
Qty: 0 | Refills: 0 | Status: DISCONTINUED | OUTPATIENT
Start: 2019-01-17 | End: 2019-01-17

## 2019-01-17 RX ORDER — METOCLOPRAMIDE HCL 10 MG
10 TABLET ORAL EVERY 6 HOURS
Qty: 0 | Refills: 0 | Status: DISCONTINUED | OUTPATIENT
Start: 2019-01-17 | End: 2019-01-18

## 2019-01-17 RX ORDER — DOCUSATE SODIUM 100 MG
100 CAPSULE ORAL
Qty: 0 | Refills: 0 | Status: DISCONTINUED | OUTPATIENT
Start: 2019-01-17 | End: 2019-01-20

## 2019-01-17 RX ADMIN — Medication 1 TABLET(S): at 14:35

## 2019-01-17 RX ADMIN — Medication 75 MILLIGRAM(S): at 19:42

## 2019-01-17 RX ADMIN — Medication 3 MILLILITER(S): at 14:29

## 2019-01-17 RX ADMIN — Medication 100 MILLIGRAM(S): at 22:14

## 2019-01-17 RX ADMIN — Medication 10 MILLIGRAM(S): at 16:46

## 2019-01-17 RX ADMIN — Medication 40 MILLIGRAM(S): at 12:15

## 2019-01-17 RX ADMIN — Medication 0.25 MILLIGRAM(S): at 10:46

## 2019-01-17 RX ADMIN — Medication 10 MILLIGRAM(S): at 10:10

## 2019-01-17 RX ADMIN — Medication 100 MILLIGRAM(S): at 14:32

## 2019-01-17 RX ADMIN — Medication 0.25 MILLIGRAM(S): at 23:00

## 2019-01-17 RX ADMIN — Medication 75 MILLIGRAM(S): at 07:19

## 2019-01-17 RX ADMIN — Medication 81 MILLIGRAM(S): at 14:35

## 2019-01-17 RX ADMIN — Medication 200 MILLIGRAM(S): at 10:40

## 2019-01-17 RX ADMIN — Medication 3 MILLILITER(S): at 19:46

## 2019-01-17 RX ADMIN — Medication 40 MILLIGRAM(S): at 07:42

## 2019-01-17 NOTE — PROGRESS NOTE ADULT - PROBLEM SELECTOR PLAN 1
given h/o sx pt w/ likely moderate persistent asthma now in exacerbation 2/2 influenza A s/p recent steroid tx for exacerbation 2wks ago also w/ multiple allergies and triggers  -peak flow still at 57% of baseline, would continue to monitor in hospital  -duonebs standing  -budesonide 0.25mg nebs bid  -solumedrol 40mg IVP qd for now given N/V episode today  -should have OP pulm f/u as pt w/ persistent asthma and may need allergy control w/ LTRA or mast cell stabilizers in future if not responding to ICS  -encourage tobacco cessation maintenance  -avoid triggers  -Pt can be discharged with a pulmicort flexhaler to help prevent further exacerbations. 360mcg twice daily. -given h/o sx pt w/ likely moderate persistent asthma now in exacerbation 2/2 influenza A s/p recent steroid tx for exacerbation 2wks ago also w/ multiple allergies and triggers.    Recommend:  -peak flow still at 57% of baseline, would continue to monitor in hospital, would check daily for improvement with therapy.   -duonebs standing q6. Would discontinue albuterol pump while pt is getting nebs, pt can take the pump home with her to be used PRN.   -Would avoid Beta blockers if possible as they can worsen bronchospasm.  -budesonide 0.25mg nebs bid  -solumedrol 40mg IVP qd for now given N/V episode today  -should have OP pulm f/u as pt w/ persistent asthma and may need allergy control w/ LTRA or mast cell stabilizers in future if not responding to ICS  -encourage tobacco cessation maintenance  -avoid triggers  -Pt can be discharged with a pulmicort flexhaler to help prevent further exacerbations. 360mcg twice daily.

## 2019-01-17 NOTE — PROGRESS NOTE ADULT - SUBJECTIVE AND OBJECTIVE BOX
INTERVAL HPI/OVERNIGHT EVENTS:    pt     VITAL SIGNS:  T(F): --  HR: --  BP: --  RR: --  SpO2: --  Wt(kg): --    PHYSICAL EXAM:    Constitutional:  Eyes:  ENMT:  Neck:  Respiratory:  Cardiovascular:  Gastrointestinal:  Extremities:  Vascular:  Neurological:  Musculoskeletal:    MEDICATIONS  (STANDING):  ALBUTerol/ipratropium for Nebulization 3 milliLiter(s) Nebulizer every 6 hours  aspirin enteric coated 81 milliGRAM(s) Oral daily  buDESOnide   0.25 milliGRAM(s) Respule 0.25 milliGRAM(s) Inhalation every 12 hours  dextrose 50% Injectable 12.5 Gram(s) IV Push once  dextrose 50% Injectable 25 Gram(s) IV Push once  insulin lispro (HumaLOG) corrective regimen sliding scale   SubCutaneous Before meals and at bedtime  methylPREDNISolone sodium succinate Injectable 40 milliGRAM(s) IV Push daily  NIFEdipine XL 30 milliGRAM(s) Oral daily  nitrofurantoin monohydrate/macrocrystals (MACROBID) 100 milliGRAM(s) Oral two times a day with meals  oseltamivir 75 milliGRAM(s) Oral two times a day  prenatal multivitamin 1 Tablet(s) Oral daily    MEDICATIONS  (PRN):  metoclopramide Injectable 10 milliGRAM(s) IV Push every 8 hours PRN Nausea and vomiting      Allergies    amoxicillin (Unknown)  iodine containing compounds (Hives; Anaphylaxis)  penicillin (Hives)  shellfish. (Hives; Anaphylaxis)    Intolerances        LABS:                RADIOLOGY & ADDITIONAL TESTS: INTERVAL HPI/OVERNIGHT EVENTS:    pt experiencing nausea and unable to keep down prednisone. received reglan. BP elevated received labetalol.    VITAL SIGNS:  T(F): 97.7F  HR: 90s-100s  BP: 110s-160s/60s-100s  RR: 12  SpO2: % on RA    PHYSICAL EXAM:  Constitutional: NAD, WDWN  HEENT: dry MM, no JVD  Respiratory: CTA b/l w/ poor air movement, no wheezing/rales/rhonchit; peak flow 250, estimated 450  Cardiovascular: S1, S2, RRR, no M/R/G  Gastrointestinal: BS+, soft, non-tender, mod-distended  Extremities: No cyanosis or clubbing. No peripheral edema in LE b/l  Neurological: A/O x 3    MEDICATIONS  (STANDING):  ALBUTerol/ipratropium for Nebulization 3 milliLiter(s) Nebulizer every 6 hours  aspirin enteric coated 81 milliGRAM(s) Oral daily  buDESOnide   0.25 milliGRAM(s) Respule 0.25 milliGRAM(s) Inhalation every 12 hours  dextrose 50% Injectable 12.5 Gram(s) IV Push once  dextrose 50% Injectable 25 Gram(s) IV Push once  insulin lispro (HumaLOG) corrective regimen sliding scale   SubCutaneous Before meals and at bedtime  methylPREDNISolone sodium succinate Injectable 40 milliGRAM(s) IV Push daily  NIFEdipine XL 30 milliGRAM(s) Oral daily  nitrofurantoin monohydrate/macrocrystals (MACROBID) 100 milliGRAM(s) Oral two times a day with meals  oseltamivir 75 milliGRAM(s) Oral two times a day  prenatal multivitamin 1 Tablet(s) Oral daily    MEDICATIONS  (PRN):  metoclopramide Injectable 10 milliGRAM(s) IV Push every 8 hours PRN Nausea and vomiting      Allergies    amoxicillin (Unknown)  iodine containing compounds (Hives; Anaphylaxis)  penicillin (Hives)  shellfish. (Hives; Anaphylaxis)    Intolerances        LABS:                RADIOLOGY & ADDITIONAL TESTS:

## 2019-01-18 VITALS — WEIGHT: 229.94 LBS

## 2019-01-18 LAB
GLUCOSE BLDC GLUCOMTR-MCNC: 114 MG/DL — HIGH (ref 70–99)
GLUCOSE BLDC GLUCOMTR-MCNC: 164 MG/DL — HIGH (ref 70–99)

## 2019-01-18 PROCEDURE — 99232 SBSQ HOSP IP/OBS MODERATE 35: CPT | Mod: GC

## 2019-01-18 RX ORDER — METOCLOPRAMIDE HCL 10 MG
10 TABLET ORAL EVERY 6 HOURS
Qty: 0 | Refills: 0 | Status: DISCONTINUED | OUTPATIENT
Start: 2019-01-18 | End: 2019-01-20

## 2019-01-18 RX ORDER — ONDANSETRON 8 MG/1
4 TABLET, FILM COATED ORAL ONCE
Qty: 0 | Refills: 0 | Status: COMPLETED | OUTPATIENT
Start: 2019-01-18 | End: 2019-01-18

## 2019-01-18 RX ORDER — FAMOTIDINE 10 MG/ML
20 INJECTION INTRAVENOUS DAILY
Qty: 0 | Refills: 0 | Status: DISCONTINUED | OUTPATIENT
Start: 2019-01-18 | End: 2019-01-18

## 2019-01-18 RX ORDER — FAMOTIDINE 10 MG/ML
20 INJECTION INTRAVENOUS DAILY
Qty: 0 | Refills: 0 | Status: DISCONTINUED | OUTPATIENT
Start: 2019-01-18 | End: 2019-01-20

## 2019-01-18 RX ADMIN — Medication 30 MILLIGRAM(S): at 07:08

## 2019-01-18 RX ADMIN — Medication 3 MILLILITER(S): at 12:00

## 2019-01-18 RX ADMIN — Medication 75 MILLIGRAM(S): at 17:48

## 2019-01-18 RX ADMIN — Medication 10 MILLIGRAM(S): at 10:20

## 2019-01-18 RX ADMIN — Medication 3 MILLILITER(S): at 07:28

## 2019-01-18 RX ADMIN — FAMOTIDINE 104 MILLIGRAM(S): 10 INJECTION INTRAVENOUS at 13:38

## 2019-01-18 RX ADMIN — Medication 0.25 MILLIGRAM(S): at 13:42

## 2019-01-18 RX ADMIN — Medication 75 MILLIGRAM(S): at 07:12

## 2019-01-18 RX ADMIN — Medication 40 MILLIGRAM(S): at 07:00

## 2019-01-18 RX ADMIN — Medication 100 MILLIGRAM(S): at 10:27

## 2019-01-18 RX ADMIN — Medication 3 MILLILITER(S): at 03:00

## 2019-01-18 RX ADMIN — Medication 81 MILLIGRAM(S): at 11:31

## 2019-01-18 RX ADMIN — Medication 100 MILLIGRAM(S): at 21:33

## 2019-01-18 RX ADMIN — Medication 100 MILLIGRAM(S): at 07:08

## 2019-01-18 RX ADMIN — Medication 1 TABLET(S): at 10:36

## 2019-01-18 RX ADMIN — ONDANSETRON 4 MILLIGRAM(S): 8 TABLET, FILM COATED ORAL at 14:13

## 2019-01-18 RX ADMIN — Medication 3 MILLILITER(S): at 22:35

## 2019-01-18 RX ADMIN — Medication 0: at 07:31

## 2019-01-18 RX ADMIN — Medication 2: at 18:29

## 2019-01-18 NOTE — DIETITIAN INITIAL EVALUATION ADULT. - NS FNS WEIGHT CHANGE REASON
difficulty trending give wt discrepancies in pt reporting UBW. **see subjective below. difficulty trending 2/2 wt discrepancies in pt reporting UBW. **see subjective below.

## 2019-01-18 NOTE — DIETITIAN INITIAL EVALUATION ADULT. - NS AS NUTRI INTERV MEALS SNACK
General/healthful diet/Continue on CSTCHO diet. Recommend providing zofran, reglan, and antacids 30min to 1hr prior to meal times. Encourage small frequent meals if experiencing early satiety.

## 2019-01-18 NOTE — DIETITIAN INITIAL EVALUATION ADULT. - OTHER INFO
39yo F  POG 28 with PMH HTN, DM and asthma p/w pre-eclampsia. Admitted to OBGYN service for pre-eclampsia and flu management (Influenza positive). Nutrition consulted to discuss current CSTCHO diet order and general healthy eating for 2nd trimester pregnancy. Pt seen resting in bed. Currently on a CSTCHO diet and tolerating PO. Appetite varies d/t nausea, vomiting and acid reflux. Discussed w/ pt timing out anti-nausea and antacid meds 1hr prior to meal times. Pt also reports experiencing early satiety- encouraged small frequent meals to optimize overall intake. Pt understanding of CSTCHO diet order- provided CHO counting edu w/ current breakfast on tray table. Pt expressed understanding and appreciation. Reports taking MVI at home. States PPW was 268lbs (28 weeks ago) and current BW is 228lbs. This indicates a 40lb wt loss (14.9% wt change) over last 7 mo.  Noted, however pt had reported at prior admission 2018 that UBW was 230lbs. Given discrepencies in pt's reporting and admitted wt being 228lbs, suspect wt usually is around 230lbs. RD to f/u to confirm UBW. Allergic to shellfish, no other dietary restrictions. Skin: surgical incision; GI WDL per flowsheet.

## 2019-01-18 NOTE — PROGRESS NOTE ADULT - PROBLEM SELECTOR PLAN 2
likely causing current exacerbation  -c/w tamiflu    -discussed w/ fellow and attending.
likely causing current exacerbation  -c/w tamiflu    -discussed w/ fellow and attending.

## 2019-01-18 NOTE — PROGRESS NOTE ADULT - PROBLEM SELECTOR PLAN 1
-given h/o sx pt w/ likely moderate persistent asthma now in exacerbation 2/2 influenza A s/p recent steroid tx for exacerbation 2wks ago also w/ multiple allergies and triggers.    Recommend:  -peak flow 60% of baseline, improving on IV steroids as pt unable to tolerate PO yesterday 2/2 N/V would continue to monitor in hospital, would check daily for improvement with therapy.  -duonebs standing q6. Would discontinue albuterol pump while pt is getting nebs, pt can take the pump home with her to be used PRN.   -Would avoid Beta blockers if possible as they can worsen bronchospasm.  -budesonide 0.25mg nebs bid  -solumedrol 40mg IVP qd for now and will consider change to PO tomorrow pending improvement and PO tolerance  -pt states she is also having reflux sx - primary team made aware and will address as tx may help w/ PO tolerance  -should have OP pulm f/u as pt w/ persistent asthma and may need allergy control w/ LTRA or mast cell stabilizers in future if not responding to ICS  -encourage tobacco cessation maintenance  -avoid triggers  -Pt can be discharged with a pulmicort flexhaler to help prevent further exacerbations. 360mcg twice daily. -given h/o sx pt w/ likely moderate persistent asthma now in exacerbation 2/2 influenza A s/p recent steroid tx for exacerbation 2wks ago also w/ multiple allergies and triggers.    Recommend:  -peak flow 60% of baseline, improving on IV steroids as pt unable to tolerate PO yesterday 2/2 N/V would continue to monitor in hospital, would check daily for improvement with therapy.  -duonebs standing q6. Would discontinue albuterol pump while pt is getting nebs, pt can take the pump home with her to be used PRN.   -Would avoid Beta blockers if possible as they can worsen bronchospasm.  -budesonide 0.25mg nebs bid  -solumedrol 40mg IVP qd for now and will consider change to PO tomorrow pending improvement and PO tolerance. Please give reglan before morning dose of prednisone.   -pt states she is also having reflux sx - primary team made aware and will address as tx may help w/ PO tolerance  -should have OP pulm f/u as pt w/ persistent asthma and may need allergy control w/ LTRA or mast cell stabilizers in future if not responding to ICS  -encourage tobacco cessation maintenance  -avoid triggers  -Pt can be discharged with a pulmicort flexhaler to help prevent further exacerbations. 360mcg twice daily. Along with Albuterol PRN. Pt has home nebulizers with albuterol as well which she can continue on an as needed basis.

## 2019-01-18 NOTE — PROGRESS NOTE ADULT - ATTENDING COMMENTS
Patient seen and examined with house-staff during bedside rounds.  Resident note read, including vitals, physical findings, laboratory data, and radiological reports.   Revisions included below.  Direct personal management at bed side and extensive interpretation of the data.  Plan was outlined and discussed in details with the housestaff.  Decision making of high complexity  Action taken for acute disease activity to reflect the level of care provided:  - medication reconciliation  - review laboratory data
Patient seen and examined with house-staff during bedside rounds.  Resident note read, including vitals, physical findings, laboratory data, and radiological reports.   Revisions included below.  Direct personal management at bed side and extensive interpretation of the data.  Plan was outlined and discussed in details with the housestaff.  Decision making of high complexity  Action taken for acute disease activity to reflect the level of care provided:  - medication reconciliation  - review laboratory data  Flu and exacerbation of bronchial asthma    Indicated to the patient that she is improving. In the morning can change the p.o. prednisone. Continue bronchodilator. Follow up on the floor tomorrow.

## 2019-01-18 NOTE — PROGRESS NOTE ADULT - SUBJECTIVE AND OBJECTIVE BOX
INTERVAL HPI/OVERNIGHT EVENTS:  -pt still c/o nausea. feels that breathing is improved    ROS: also notes GERD like sx, denies CP/pressure/tightness/aplitations    VS reviewed in OBGYN suite, stable    PHYSICAL EXAM:  Constitutional: NAD, WDWN  HEENT: dry MM, no JVD  Respiratory: CTA b/l w/ improved air movement, no wheezing/rales/rhonchit; peak flow 270, estimated 450  Cardiovascular: S1, S2, RRR, no M/R/G  Gastrointestinal: BS+, soft, non-tender, mod-distended  Extremities: No cyanosis or clubbing. No peripheral edema in LE b/l  Neurological: A/O x 3    MEDICATIONS  (STANDING):  ALBUTerol/ipratropium for Nebulization 3 milliLiter(s) Nebulizer every 6 hours  aspirin enteric coated 81 milliGRAM(s) Oral daily  buDESOnide   0.25 milliGRAM(s) Respule 0.25 milliGRAM(s) Inhalation every 12 hours  dextrose 50% Injectable 12.5 Gram(s) IV Push once  dextrose 50% Injectable 25 Gram(s) IV Push once  docusate sodium 100 milliGRAM(s) Oral two times a day  famotidine  IVPB 20 milliGRAM(s) IV Intermittent daily  insulin lispro (HumaLOG) corrective regimen sliding scale   SubCutaneous Before meals and at bedtime  methylPREDNISolone sodium succinate Injectable 40 milliGRAM(s) IV Push daily  NIFEdipine XL 30 milliGRAM(s) Oral daily  nitrofurantoin monohydrate/macrocrystals (MACROBID) 100 milliGRAM(s) Oral two times a day with meals  oseltamivir 75 milliGRAM(s) Oral two times a day  prenatal multivitamin 1 Tablet(s) Oral daily    MEDICATIONS  (PRN):  metoclopramide Injectable 10 milliGRAM(s) IV Push every 6 hours PRN Nausea and vomiting      Allergies    amoxicillin (Unknown)  iodine containing compounds (Hives; Anaphylaxis)  penicillin (Hives)  shellfish. (Hives; Anaphylaxis)    Intolerances        LABS:                RADIOLOGY & ADDITIONAL TESTS: INTERVAL HPI/OVERNIGHT EVENTS:  -pt still c/o nausea. feels that breathing is improved. The patient still notes some mild wheezing.     ROS: also notes GERD like sx, denies CP/pressure/tightness/aplitations    VS reviewed in OBGYN suite, stable    PHYSICAL EXAM:  Constitutional: NAD, WDWN  HEENT: dry MM, no JVD  Respiratory: CTA b/l w/ improved air movement, no wheezing/rales/rhonchi ; peak flow 270, estimated 450  Cardiovascular: S1, S2, RRR, no M/R/G  Gastrointestinal: BS+, soft, non-tender, mod-distended  Extremities: No cyanosis or clubbing. No peripheral edema in LE b/l  Neurological: A/O x 3    MEDICATIONS  (STANDING):  ALBUTerol/ipratropium for Nebulization 3 milliLiter(s) Nebulizer every 6 hours  aspirin enteric coated 81 milliGRAM(s) Oral daily  buDESOnide   0.25 milliGRAM(s) Respule 0.25 milliGRAM(s) Inhalation every 12 hours  dextrose 50% Injectable 12.5 Gram(s) IV Push once  dextrose 50% Injectable 25 Gram(s) IV Push once  docusate sodium 100 milliGRAM(s) Oral two times a day  famotidine  IVPB 20 milliGRAM(s) IV Intermittent daily  insulin lispro (HumaLOG) corrective regimen sliding scale   SubCutaneous Before meals and at bedtime  methylPREDNISolone sodium succinate Injectable 40 milliGRAM(s) IV Push daily  NIFEdipine XL 30 milliGRAM(s) Oral daily  nitrofurantoin monohydrate/macrocrystals (MACROBID) 100 milliGRAM(s) Oral two times a day with meals  oseltamivir 75 milliGRAM(s) Oral two times a day  prenatal multivitamin 1 Tablet(s) Oral daily    MEDICATIONS  (PRN):  metoclopramide Injectable 10 milliGRAM(s) IV Push every 6 hours PRN Nausea and vomiting      Allergies    amoxicillin (Unknown)  iodine containing compounds (Hives; Anaphylaxis)  penicillin (Hives)  shellfish. (Hives; Anaphylaxis)    Intolerances        LABS:                RADIOLOGY & ADDITIONAL TESTS:

## 2019-01-18 NOTE — PROGRESS NOTE ADULT - ASSESSMENT
38F  28wks pregnant PMH HTN, DM2, moderate persistent asthma, migraines, L breast lump presented to ED for chest discomfort found to have Influenza A admitted to OBGYN for monitoring pulm consulted for asthma exacerbation 38F  28wks pregnant PMH HTN, DM2, moderate persistent asthma, migraines, L breast lump presented to ED for chest discomfort found to have Influenza A admitted to OBGYN for monitoring pulm consulted for asthma exacerbation.

## 2019-01-18 NOTE — DIETITIAN INITIAL EVALUATION ADULT. - ENERGY NEEDS
Height: 5'7" Weight: 228lbs, IBW 135lbs+/-10%, %%, BMI 35.8  IBW used for calculations as pt >120% of IBW   Nutrient needs based on [facility] standards of care for third trimester pregnancy  1982-2288kcal/day (25-30kcal/kg IBW +452kcal/day)

## 2019-01-19 LAB
ALBUMIN SERPL ELPH-MCNC: 3.3 G/DL — SIGNIFICANT CHANGE UP (ref 3.3–5)
ALP SERPL-CCNC: 73 U/L — SIGNIFICANT CHANGE UP (ref 40–120)
ALT FLD-CCNC: 33 U/L — SIGNIFICANT CHANGE UP (ref 10–45)
ANION GAP SERPL CALC-SCNC: 11 MMOL/L — SIGNIFICANT CHANGE UP (ref 5–17)
ANION GAP SERPL CALC-SCNC: 13 MMOL/L — SIGNIFICANT CHANGE UP (ref 5–17)
AST SERPL-CCNC: 35 U/L — SIGNIFICANT CHANGE UP (ref 10–40)
BASOPHILS NFR BLD AUTO: 0.7 % — SIGNIFICANT CHANGE UP (ref 0–2)
BILIRUB SERPL-MCNC: 0.3 MG/DL — SIGNIFICANT CHANGE UP (ref 0.2–1.2)
BLD GP AB SCN SERPL QL: POSITIVE — SIGNIFICANT CHANGE UP
BUN SERPL-MCNC: 5 MG/DL — LOW (ref 7–23)
BUN SERPL-MCNC: 5 MG/DL — LOW (ref 7–23)
CALCIUM SERPL-MCNC: 8.2 MG/DL — LOW (ref 8.4–10.5)
CALCIUM SERPL-MCNC: 8.7 MG/DL — SIGNIFICANT CHANGE UP (ref 8.4–10.5)
CHLORIDE SERPL-SCNC: 100 MMOL/L — SIGNIFICANT CHANGE UP (ref 96–108)
CHLORIDE SERPL-SCNC: 104 MMOL/L — SIGNIFICANT CHANGE UP (ref 96–108)
CO2 SERPL-SCNC: 24 MMOL/L — SIGNIFICANT CHANGE UP (ref 22–31)
CO2 SERPL-SCNC: 24 MMOL/L — SIGNIFICANT CHANGE UP (ref 22–31)
CREAT SERPL-MCNC: 0.47 MG/DL — LOW (ref 0.5–1.3)
CREAT SERPL-MCNC: 0.5 MG/DL — SIGNIFICANT CHANGE UP (ref 0.5–1.3)
CULTURE RESULTS: SIGNIFICANT CHANGE UP
CULTURE RESULTS: SIGNIFICANT CHANGE UP
EOSINOPHIL NFR BLD AUTO: 0.4 % — SIGNIFICANT CHANGE UP (ref 0–6)
GLUCOSE BLDC GLUCOMTR-MCNC: 112 MG/DL — HIGH (ref 70–99)
GLUCOSE BLDC GLUCOMTR-MCNC: 174 MG/DL — HIGH (ref 70–99)
GLUCOSE BLDC GLUCOMTR-MCNC: 253 MG/DL — HIGH (ref 70–99)
GLUCOSE BLDC GLUCOMTR-MCNC: 90 MG/DL — SIGNIFICANT CHANGE UP (ref 70–99)
GLUCOSE SERPL-MCNC: 159 MG/DL — HIGH (ref 70–99)
GLUCOSE SERPL-MCNC: 96 MG/DL — SIGNIFICANT CHANGE UP (ref 70–99)
HCT VFR BLD CALC: 36 % — SIGNIFICANT CHANGE UP (ref 34.5–45)
HGB BLD-MCNC: 12 G/DL — SIGNIFICANT CHANGE UP (ref 11.5–15.5)
LDH SERPL L TO P-CCNC: 259 U/L — HIGH (ref 50–242)
LYMPHOCYTES # BLD AUTO: 38.6 % — SIGNIFICANT CHANGE UP (ref 13–44)
MCHC RBC-ENTMCNC: 28 PG — SIGNIFICANT CHANGE UP (ref 27–34)
MCHC RBC-ENTMCNC: 33.3 G/DL — SIGNIFICANT CHANGE UP (ref 32–36)
MCV RBC AUTO: 84.1 FL — SIGNIFICANT CHANGE UP (ref 80–100)
MONOCYTES NFR BLD AUTO: 8.6 % — SIGNIFICANT CHANGE UP (ref 2–14)
NEUTROPHILS NFR BLD AUTO: 51.7 % — SIGNIFICANT CHANGE UP (ref 43–77)
PLATELET # BLD AUTO: 296 K/UL — SIGNIFICANT CHANGE UP (ref 150–400)
POTASSIUM SERPL-MCNC: 2.6 MMOL/L — CRITICAL LOW (ref 3.5–5.3)
POTASSIUM SERPL-MCNC: 3.1 MMOL/L — LOW (ref 3.5–5.3)
POTASSIUM SERPL-SCNC: 2.6 MMOL/L — CRITICAL LOW (ref 3.5–5.3)
POTASSIUM SERPL-SCNC: 3.1 MMOL/L — LOW (ref 3.5–5.3)
PROT SERPL-MCNC: 7 G/DL — SIGNIFICANT CHANGE UP (ref 6–8.3)
RBC # BLD: 4.28 M/UL — SIGNIFICANT CHANGE UP (ref 3.8–5.2)
RBC # FLD: 14.9 % — SIGNIFICANT CHANGE UP (ref 10.3–16.9)
RH IG SCN BLD-IMP: NEGATIVE — SIGNIFICANT CHANGE UP
SODIUM SERPL-SCNC: 137 MMOL/L — SIGNIFICANT CHANGE UP (ref 135–145)
SODIUM SERPL-SCNC: 139 MMOL/L — SIGNIFICANT CHANGE UP (ref 135–145)
SPECIMEN SOURCE: SIGNIFICANT CHANGE UP
SPECIMEN SOURCE: SIGNIFICANT CHANGE UP
URATE SERPL-MCNC: 4.3 MG/DL — SIGNIFICANT CHANGE UP (ref 2.5–7)
WBC # BLD: 11.8 K/UL — HIGH (ref 3.8–10.5)
WBC # FLD AUTO: 11.8 K/UL — HIGH (ref 3.8–10.5)

## 2019-01-19 PROCEDURE — 86077 PHYS BLOOD BANK SERV XMATCH: CPT

## 2019-01-19 RX ORDER — POTASSIUM CHLORIDE 20 MEQ
40 PACKET (EA) ORAL EVERY 8 HOURS
Qty: 0 | Refills: 0 | Status: DISCONTINUED | OUTPATIENT
Start: 2019-01-19 | End: 2019-01-19

## 2019-01-19 RX ORDER — ACETAMINOPHEN 500 MG
1000 TABLET ORAL ONCE
Qty: 0 | Refills: 0 | Status: COMPLETED | OUTPATIENT
Start: 2019-01-19 | End: 2019-01-19

## 2019-01-19 RX ORDER — POTASSIUM CHLORIDE 20 MEQ
40 PACKET (EA) ORAL EVERY 8 HOURS
Qty: 0 | Refills: 0 | Status: COMPLETED | OUTPATIENT
Start: 2019-01-19 | End: 2019-01-20

## 2019-01-19 RX ORDER — POTASSIUM CHLORIDE 20 MEQ
10 PACKET (EA) ORAL
Qty: 0 | Refills: 0 | Status: COMPLETED | OUTPATIENT
Start: 2019-01-19 | End: 2019-01-19

## 2019-01-19 RX ORDER — FAMOTIDINE 10 MG/ML
20 INJECTION INTRAVENOUS ONCE
Qty: 0 | Refills: 0 | Status: COMPLETED | OUTPATIENT
Start: 2019-01-19 | End: 2019-01-19

## 2019-01-19 RX ORDER — NIFEDIPINE 30 MG
30 TABLET, EXTENDED RELEASE 24 HR ORAL DAILY
Qty: 0 | Refills: 0 | Status: DISCONTINUED | OUTPATIENT
Start: 2019-01-19 | End: 2019-01-20

## 2019-01-19 RX ORDER — ACETAMINOPHEN 500 MG
650 TABLET ORAL EVERY 6 HOURS
Qty: 0 | Refills: 0 | Status: DISCONTINUED | OUTPATIENT
Start: 2019-01-19 | End: 2019-01-20

## 2019-01-19 RX ORDER — NIFEDIPINE 30 MG
30 TABLET, EXTENDED RELEASE 24 HR ORAL DAILY
Qty: 0 | Refills: 0 | Status: DISCONTINUED | OUTPATIENT
Start: 2019-01-19 | End: 2019-01-19

## 2019-01-19 RX ORDER — NIFEDIPINE 30 MG
30 TABLET, EXTENDED RELEASE 24 HR ORAL DAILY
Qty: 0 | Refills: 0 | Status: DISCONTINUED | OUTPATIENT
Start: 2019-01-20 | End: 2019-01-20

## 2019-01-19 RX ADMIN — Medication 400 MILLIGRAM(S): at 19:40

## 2019-01-19 RX ADMIN — FAMOTIDINE 20 MILLIGRAM(S): 10 INJECTION INTRAVENOUS at 03:45

## 2019-01-19 RX ADMIN — Medication 10 MILLIGRAM(S): at 23:09

## 2019-01-19 RX ADMIN — Medication 81 MILLIGRAM(S): at 13:40

## 2019-01-19 RX ADMIN — FAMOTIDINE 104 MILLIGRAM(S): 10 INJECTION INTRAVENOUS at 18:20

## 2019-01-19 RX ADMIN — Medication 1000 MILLIGRAM(S): at 22:32

## 2019-01-19 RX ADMIN — Medication 650 MILLIGRAM(S): at 12:55

## 2019-01-19 RX ADMIN — Medication 3 MILLILITER(S): at 10:06

## 2019-01-19 RX ADMIN — Medication 3 MILLILITER(S): at 18:24

## 2019-01-19 RX ADMIN — Medication 100 MILLIGRAM(S): at 09:58

## 2019-01-19 RX ADMIN — Medication 75 MILLIGRAM(S): at 07:20

## 2019-01-19 RX ADMIN — Medication 100 MILLIGRAM(S): at 23:03

## 2019-01-19 RX ADMIN — Medication 100 MILLIEQUIVALENT(S): at 09:50

## 2019-01-19 RX ADMIN — Medication 1 TABLET(S): at 12:27

## 2019-01-19 RX ADMIN — Medication 650 MILLIGRAM(S): at 12:30

## 2019-01-19 RX ADMIN — Medication 100 MILLIEQUIVALENT(S): at 11:56

## 2019-01-19 RX ADMIN — Medication 100 MILLIEQUIVALENT(S): at 06:59

## 2019-01-19 RX ADMIN — Medication 40 MILLIEQUIVALENT(S): at 23:03

## 2019-01-19 RX ADMIN — Medication 400 MILLIGRAM(S): at 03:46

## 2019-01-19 RX ADMIN — Medication 10 MILLIGRAM(S): at 07:19

## 2019-01-19 RX ADMIN — Medication 40 MILLIGRAM(S): at 07:19

## 2019-01-19 RX ADMIN — Medication 6: at 12:30

## 2019-01-19 RX ADMIN — Medication 30 MILLIGRAM(S): at 18:23

## 2019-01-19 RX ADMIN — Medication 30 MILLIGRAM(S): at 07:20

## 2019-01-19 RX ADMIN — Medication 75 MILLIGRAM(S): at 18:23

## 2019-01-19 RX ADMIN — Medication 1000 MILLIGRAM(S): at 04:30

## 2019-01-20 LAB
ANION GAP SERPL CALC-SCNC: 11 MMOL/L — SIGNIFICANT CHANGE UP (ref 5–17)
BUN SERPL-MCNC: 6 MG/DL — LOW (ref 7–23)
CALCIUM SERPL-MCNC: 8.7 MG/DL — SIGNIFICANT CHANGE UP (ref 8.4–10.5)
CHLORIDE SERPL-SCNC: 102 MMOL/L — SIGNIFICANT CHANGE UP (ref 96–108)
CO2 SERPL-SCNC: 25 MMOL/L — SIGNIFICANT CHANGE UP (ref 22–31)
CREAT SERPL-MCNC: 0.49 MG/DL — LOW (ref 0.5–1.3)
GLUCOSE BLDC GLUCOMTR-MCNC: 134 MG/DL — HIGH (ref 70–99)
GLUCOSE BLDC GLUCOMTR-MCNC: 154 MG/DL — HIGH (ref 70–99)
GLUCOSE BLDC GLUCOMTR-MCNC: 165 MG/DL — HIGH (ref 70–99)
GLUCOSE SERPL-MCNC: 158 MG/DL — HIGH (ref 70–99)
POTASSIUM SERPL-MCNC: 3.4 MMOL/L — LOW (ref 3.5–5.3)
POTASSIUM SERPL-SCNC: 3.4 MMOL/L — LOW (ref 3.5–5.3)
SODIUM SERPL-SCNC: 138 MMOL/L — SIGNIFICANT CHANGE UP (ref 135–145)

## 2019-01-20 PROCEDURE — 93005 ELECTROCARDIOGRAM TRACING: CPT

## 2019-01-20 PROCEDURE — 82803 BLOOD GASES ANY COMBINATION: CPT

## 2019-01-20 PROCEDURE — 82330 ASSAY OF CALCIUM: CPT

## 2019-01-20 PROCEDURE — 81001 URINALYSIS AUTO W/SCOPE: CPT

## 2019-01-20 PROCEDURE — 84156 ASSAY OF PROTEIN URINE: CPT

## 2019-01-20 PROCEDURE — 86901 BLOOD TYPING SEROLOGIC RH(D): CPT

## 2019-01-20 PROCEDURE — 86880 COOMBS TEST DIRECT: CPT

## 2019-01-20 PROCEDURE — 87798 DETECT AGENT NOS DNA AMP: CPT

## 2019-01-20 PROCEDURE — 83615 LACTATE (LD) (LDH) ENZYME: CPT

## 2019-01-20 PROCEDURE — 84484 ASSAY OF TROPONIN QUANT: CPT

## 2019-01-20 PROCEDURE — 83605 ASSAY OF LACTIC ACID: CPT

## 2019-01-20 PROCEDURE — 85384 FIBRINOGEN ACTIVITY: CPT

## 2019-01-20 PROCEDURE — 83690 ASSAY OF LIPASE: CPT

## 2019-01-20 PROCEDURE — 85730 THROMBOPLASTIN TIME PARTIAL: CPT

## 2019-01-20 PROCEDURE — 84100 ASSAY OF PHOSPHORUS: CPT

## 2019-01-20 PROCEDURE — 76705 ECHO EXAM OF ABDOMEN: CPT

## 2019-01-20 PROCEDURE — 80053 COMPREHEN METABOLIC PANEL: CPT

## 2019-01-20 PROCEDURE — 84132 ASSAY OF SERUM POTASSIUM: CPT

## 2019-01-20 PROCEDURE — 36415 COLL VENOUS BLD VENIPUNCTURE: CPT

## 2019-01-20 PROCEDURE — 96365 THER/PROPH/DIAG IV INF INIT: CPT

## 2019-01-20 PROCEDURE — 82550 ASSAY OF CK (CPK): CPT

## 2019-01-20 PROCEDURE — 96375 TX/PRO/DX INJ NEW DRUG ADDON: CPT

## 2019-01-20 PROCEDURE — 84702 CHORIONIC GONADOTROPIN TEST: CPT

## 2019-01-20 PROCEDURE — 87040 BLOOD CULTURE FOR BACTERIA: CPT

## 2019-01-20 PROCEDURE — 86900 BLOOD TYPING SEROLOGIC ABO: CPT

## 2019-01-20 PROCEDURE — 87086 URINE CULTURE/COLONY COUNT: CPT

## 2019-01-20 PROCEDURE — 83880 ASSAY OF NATRIURETIC PEPTIDE: CPT

## 2019-01-20 PROCEDURE — 96367 TX/PROPH/DG ADDL SEQ IV INF: CPT

## 2019-01-20 PROCEDURE — 85025 COMPLETE CBC W/AUTO DIFF WBC: CPT

## 2019-01-20 PROCEDURE — 96376 TX/PRO/DX INJ SAME DRUG ADON: CPT

## 2019-01-20 PROCEDURE — 85027 COMPLETE CBC AUTOMATED: CPT

## 2019-01-20 PROCEDURE — 84295 ASSAY OF SERUM SODIUM: CPT

## 2019-01-20 PROCEDURE — 82962 GLUCOSE BLOOD TEST: CPT

## 2019-01-20 PROCEDURE — 83735 ASSAY OF MAGNESIUM: CPT

## 2019-01-20 PROCEDURE — 71275 CT ANGIOGRAPHY CHEST: CPT

## 2019-01-20 PROCEDURE — 85610 PROTHROMBIN TIME: CPT

## 2019-01-20 PROCEDURE — 87486 CHLMYD PNEUM DNA AMP PROBE: CPT

## 2019-01-20 PROCEDURE — 99291 CRITICAL CARE FIRST HOUR: CPT | Mod: 25

## 2019-01-20 PROCEDURE — 86850 RBC ANTIBODY SCREEN: CPT

## 2019-01-20 PROCEDURE — 86870 RBC ANTIBODY IDENTIFICATION: CPT

## 2019-01-20 PROCEDURE — 80048 BASIC METABOLIC PNL TOTAL CA: CPT

## 2019-01-20 PROCEDURE — 87633 RESP VIRUS 12-25 TARGETS: CPT

## 2019-01-20 PROCEDURE — 82553 CREATINE MB FRACTION: CPT

## 2019-01-20 PROCEDURE — 87581 M.PNEUMON DNA AMP PROBE: CPT

## 2019-01-20 PROCEDURE — 96368 THER/DIAG CONCURRENT INF: CPT

## 2019-01-20 PROCEDURE — 85379 FIBRIN DEGRADATION QUANT: CPT

## 2019-01-20 PROCEDURE — 94640 AIRWAY INHALATION TREATMENT: CPT

## 2019-01-20 PROCEDURE — 93970 EXTREMITY STUDY: CPT

## 2019-01-20 PROCEDURE — 71045 X-RAY EXAM CHEST 1 VIEW: CPT

## 2019-01-20 PROCEDURE — 84550 ASSAY OF BLOOD/URIC ACID: CPT

## 2019-01-20 RX ORDER — ASPIRIN/CALCIUM CARB/MAGNESIUM 324 MG
1 TABLET ORAL
Qty: 0 | Refills: 0 | COMMUNITY
Start: 2019-01-20

## 2019-01-20 RX ORDER — IPRATROPIUM/ALBUTEROL SULFATE 18-103MCG
3 AEROSOL WITH ADAPTER (GRAM) INHALATION
Qty: 0 | Refills: 0 | COMMUNITY
Start: 2019-01-20

## 2019-01-20 RX ORDER — BUDESONIDE, MICRONIZED 100 %
2 POWDER (GRAM) MISCELLANEOUS
Qty: 1 | Refills: 3 | OUTPATIENT
Start: 2019-01-20 | End: 2020-01-14

## 2019-01-20 RX ORDER — BUDESONIDE, MICRONIZED 100 %
1 POWDER (GRAM) MISCELLANEOUS
Qty: 1 | Refills: 3 | OUTPATIENT
Start: 2019-01-20 | End: 2020-01-14

## 2019-01-20 RX ORDER — ONDANSETRON 8 MG/1
4 TABLET, FILM COATED ORAL ONCE
Qty: 0 | Refills: 0 | Status: COMPLETED | OUTPATIENT
Start: 2019-01-20 | End: 2019-01-20

## 2019-01-20 RX ORDER — METOCLOPRAMIDE HCL 10 MG
1 TABLET ORAL
Qty: 90 | Refills: 1 | OUTPATIENT
Start: 2019-01-20 | End: 2019-03-20

## 2019-01-20 RX ORDER — NITROFURANTOIN MACROCRYSTAL 50 MG
1 CAPSULE ORAL
Qty: 4 | Refills: 0 | OUTPATIENT
Start: 2019-01-20 | End: 2019-01-21

## 2019-01-20 RX ORDER — NIFEDIPINE 30 MG
1 TABLET, EXTENDED RELEASE 24 HR ORAL
Qty: 60 | Refills: 3 | OUTPATIENT
Start: 2019-01-20 | End: 2019-05-19

## 2019-01-20 RX ORDER — NIFEDIPINE 30 MG
30 TABLET, EXTENDED RELEASE 24 HR ORAL
Qty: 0 | Refills: 0 | Status: DISCONTINUED | OUTPATIENT
Start: 2019-01-20 | End: 2019-01-20

## 2019-01-20 RX ADMIN — Medication 1 TABLET(S): at 12:07

## 2019-01-20 RX ADMIN — Medication 2: at 12:25

## 2019-01-20 RX ADMIN — Medication 75 MILLIGRAM(S): at 06:22

## 2019-01-20 RX ADMIN — Medication 100 MILLIGRAM(S): at 10:38

## 2019-01-20 RX ADMIN — Medication 100 MILLIGRAM(S): at 06:22

## 2019-01-20 RX ADMIN — ONDANSETRON 4 MILLIGRAM(S): 8 TABLET, FILM COATED ORAL at 10:55

## 2019-01-20 RX ADMIN — Medication 10 MILLIGRAM(S): at 07:17

## 2019-01-20 RX ADMIN — Medication 3 MILLILITER(S): at 12:29

## 2019-01-20 RX ADMIN — Medication 40 MILLIEQUIVALENT(S): at 12:07

## 2019-01-20 RX ADMIN — Medication 40 MILLIGRAM(S): at 07:17

## 2019-01-20 RX ADMIN — Medication 81 MILLIGRAM(S): at 12:07

## 2019-01-20 RX ADMIN — Medication 30 MILLIGRAM(S): at 06:22

## 2019-01-20 NOTE — DISCHARGE NOTE ANTEPARTUM - PATIENT PORTAL LINK FT
You can access the [facility] Patient Portal, offered by [facility], by registering with the following website: [email or web address]

## 2019-01-20 NOTE — DISCHARGE NOTE ANTEPARTUM - CARE PLAN
Principal Discharge DX:	Moderate persistent asthma with exacerbation  Goal:	improvement of asthma exacerbation  Assessment and plan of treatment:	Continue pulmicort 360mcg two times a day. use albuterol inhaler and nebulizer as needed. call to schedule a follow up appt with Dr. Fallon.  Secondary Diagnosis:	Diabetes  Goal:	control of blood glucose  Assessment and plan of treatment:	continue to monitor fingerstick glucose in the morning before eating and 2 hours after breakfast, lunch, and dinner. keep record of the glucose. Call Dr. Sharif's office (high risk OB) to schedule an appointment for Tuesday January 22.  Secondary Diagnosis:	Preeclampsia, severe, third trimester  Goal:	control of blood pressure  Assessment and plan of treatment:	continue nifedipine 30 mg every 12 hours. continue aspirin 81mg daily. check blood pressure two times a day and keep record of the values. f/u with Dr. Sharif on tuesday 1/22. if blood pressures are greater than or equal to 160 on the top or 110 on the bottom, if you experience nonremitting headache, blurred vision, spots in your vision, right upper abdomen or upper middle abdomen pain  go to the nearest labor and delivery unit.  Secondary Diagnosis:	Influenza A  Goal:	resolve of symptoms  Assessment and plan of treatment:	you completed a full course of tamiflu for influenza A. Recommend flu shot.  Secondary Diagnosis:	UTI (urinary tract infection) during pregnancy  Goal:	resolve of symptoms  Assessment and plan of treatment:	continue macrobid (nitrofurantoin) 100mg two times a day for total of 7 days. take terconazole suppository at bedtime x 7 days for vaginitis. Principal Discharge DX:	Moderate persistent asthma with exacerbation  Goal:	improvement of asthma exacerbation  Assessment and plan of treatment:	Continue pulmicort 360mcg two times a day. use albuterol inhaler and nebulizer as needed. call to schedule a follow up appt with Dr. Fallon.  Secondary Diagnosis:	Diabetes  Goal:	control of blood glucose  Assessment and plan of treatment:	continue to monitor fingerstick glucose in the morning before eating and 2 hours after breakfast, lunch, and dinner. keep record of the glucose. You have an appointment with Dr. Sharif (maternal fetal medicine) on tuesday 1/22 at 10am.  Secondary Diagnosis:	Preeclampsia, severe, third trimester  Goal:	control of blood pressure  Assessment and plan of treatment:	continue nifedipine 30 mg every 12 hours. continue aspirin 81mg daily. check blood pressure two times a day and keep record of the values. f/u with Dr. Sharif on tuesday 1/22. if blood pressures are greater than or equal to 160 on the top or 110 on the bottom, if you experience nonremitting headache, blurred vision, spots in your vision, right upper abdomen or upper middle abdomen pain  go to the nearest labor and delivery unit.  Secondary Diagnosis:	Influenza A  Goal:	resolve of symptoms  Assessment and plan of treatment:	you completed a full course of tamiflu for influenza A. Recommend flu shot.  Secondary Diagnosis:	UTI (urinary tract infection) during pregnancy  Goal:	resolve of symptoms  Assessment and plan of treatment:	continue macrobid (nitrofurantoin) 100mg two times a day for total of 7 days. take terconazole suppository at bedtime x 7 days for vaginitis.

## 2019-01-20 NOTE — DISCHARGE NOTE ANTEPARTUM - MEDICATION SUMMARY - MEDICATIONS TO TAKE
I will START or STAY ON the medications listed below when I get home from the hospital:    Pulmicort Flexhaler 180 mcg/inh inhalation powder  -- 2 microgram(s) inhaled 2 times a day   -- For inhalation only.  Rinse mouth thoroughly after use.    -- Indication: For asthma    aspirin 81 mg oral delayed release tablet  -- 1 tab(s) by mouth once a day  -- Indication: For chtn    Diclegis 10 mg-10 mg oral delayed release tablet  -- 2 tab(s) by mouth once a day (at bedtime)   -- Do not chew, break, or crush.  Do not drink alcoholic beverages when taking this medication.  May cause drowsiness.  Alcohol may intensify this effect.  Use care when operating dangerous machinery.  Some non-prescription drugs may aggravate your condition.  Read all labels carefully.  If a warning appears, check with your doctor before taking.  Swallow whole.  Do not crush.  Take medication on an empty stomach 1 hour before or 2 to 3 hours after a meal unless otherwise directed by your doctor.  This drug may impair the ability to drive or operate machinery.  Use care until you become familiar with its effects.    -- Indication: For nausea in pregnancy    metoclopramide 10 mg oral tablet  -- 1 tab(s) by mouth every 8 hours, As Needed -for nausea   -- It is very important that you take or use this exactly as directed.  Do not skip doses or discontinue unless directed by your doctor.  May cause drowsiness.  Alcohol may intensify this effect.  Use care when operating dangerous machinery.  Take medication on an empty stomach 1 hour before or 2 to 3 hours after a meal unless otherwise directed by your doctor.    -- Indication: For nausea in pregnancy    ipratropium-albuterol 0.5 mg-2.5 mg/3 mLinhalation solution  -- 3 milliliter(s) inhaled every 6 hours  -- Indication: For asthma    NIFEdipine 30 mg oral tablet, extended release  -- 1 tab(s) by mouth 2 times a day   -- Avoid grapefruit and grapefruit juice while taking this medication.  It is very important that you take or use this exactly as directed.  Do not skip doses or discontinue unless directed by your doctor.  Some non-prescription drugs may aggravate your condition.  Read all labels carefully.  If a warning appears, check with your doctor before taking.  Swallow whole.  Do not crush.    -- Indication: For Superimposed preclampsia    PNV Prenatal oral tablet  -- 1 tab(s) by mouth once a day   -- May discolor urine or feces.  Take with food or milk.    -- Indication: For Pregnancy    nitrofurantoin macrocrystals-monohydrate 100 mg oral capsule  -- 1 cap(s) by mouth 2 times a day (with meals)  -- Indication: For UTI    terconazole 80 mg vaginal suppository  -- 1 suppository(ies) intravaginally once a day (at bedtime)   -- Finish all this medication unless otherwise directed by prescriber.  For vaginal use.    -- Indication: For vaginitis

## 2019-01-20 NOTE — DISCHARGE NOTE ANTEPARTUM - HOSPITAL COURSE
Patient admitted with exacerbation of asthma secondary to influenza A. Pt received full course of tamiflu 75mg BID x 5 days. Patient was seen by pulmonology and received a 5 day course of oral/IV steroids. Pt to be discharged home on pulmicort and albuterol inhaler and to follow up outpatient with pulmonology. Patient's fingerstick glucose monitored and corrected with insulin sliding scale throughout hospital course. Patient to follow up with maternal fetal medicine for mgmt of diabetes. Patient started on nifedipine 30mg BID for hypertension. pt diagnosed with superimposed preeclampsia during hospital course. Pt to follow up with maternal fetal medicine outpatient for monitoring of hypertension.

## 2019-01-20 NOTE — DISCHARGE NOTE ANTEPARTUM - PLAN OF CARE
continue to monitor fingerstick glucose in the morning before eating and 2 hours after breakfast, lunch, and dinner. keep record of the glucose. Call Dr. Sharif's office (high risk OB) to schedule an appointment for Tuesday January 22. control of blood pressure continue nifedipine 30 mg every 12 hours. continue aspirin 81mg daily. check blood pressure two times a day and keep record of the values. f/u with Dr. Sharif on tuesday 1/22. if blood pressures are greater than or equal to 160 on the top or 110 on the bottom, if you experience nonremitting headache, blurred vision, spots in your vision, right upper abdomen or upper middle abdomen pain  go to the nearest labor and delivery unit. resolve of symptoms you completed a full course of tamiflu for influenza A. Recommend flu shot. continue macrobid (nitrofurantoin) 100mg two times a day for total of 7 days. take terconazole suppository at bedtime x 7 days for vaginitis. improvement of asthma exacerbation Continue pulmicort 360mcg two times a day. use albuterol inhaler and nebulizer as needed. call to schedule a follow up appt with Dr. Fallon. control of blood glucose continue to monitor fingerstick glucose in the morning before eating and 2 hours after breakfast, lunch, and dinner. keep record of the glucose. You have an appointment with Dr. Sharif (maternal fetal medicine) on tuesday 1/22 at 10am.

## 2019-01-20 NOTE — DISCHARGE NOTE ANTEPARTUM - CARE PROVIDER_API CALL
Pelon Villa), Obstetrics and Gynecology  [address] [address]  Phone: [phone]  Fax: [phone]    Meagan Fallon), Critical Care Medicine; Pulmonary Disease  [address]  [address] [place] [address]  Phone: [phone]  Fax: [phone]    Denisha Sharif), MaternalFetal Medicine; Obstetrics and Gynecology  [address] [address]  Phone: [phone]  Fax: [phone]

## 2019-01-20 NOTE — DISCHARGE NOTE ANTEPARTUM - CARE PROVIDERS DIRECT ADDRESSES
[email or web address]

## 2019-01-20 NOTE — DISCHARGE NOTE ANTEPARTUM - SECONDARY DIAGNOSIS.
Preeclampsia, severe, third trimester Influenza A UTI (urinary tract infection) during pregnancy Diabetes

## 2019-01-23 DIAGNOSIS — O24.113 PRE-EXISTING TYPE 2 DIABETES MELLITUS, IN PREGNANCY, THIRD TRIMESTER: ICD-10-CM

## 2019-01-23 DIAGNOSIS — O99.213 OBESITY COMPLICATING PREGNANCY, THIRD TRIMESTER: ICD-10-CM

## 2019-01-23 DIAGNOSIS — E87.6 HYPOKALEMIA: ICD-10-CM

## 2019-01-23 DIAGNOSIS — Z88.1 ALLERGY STATUS TO OTHER ANTIBIOTIC AGENTS STATUS: ICD-10-CM

## 2019-01-23 DIAGNOSIS — E78.5 HYPERLIPIDEMIA, UNSPECIFIED: ICD-10-CM

## 2019-01-23 DIAGNOSIS — Z3A.28 28 WEEKS GESTATION OF PREGNANCY: ICD-10-CM

## 2019-01-23 DIAGNOSIS — O10.913 UNSPECIFIED PRE-EXISTING HYPERTENSION COMPLICATING PREGNANCY, THIRD TRIMESTER: ICD-10-CM

## 2019-01-23 DIAGNOSIS — O11.3 PRE-EXISTING HYPERTENSION WITH PRE-ECLAMPSIA, THIRD TRIMESTER: ICD-10-CM

## 2019-01-23 DIAGNOSIS — Z88.0 ALLERGY STATUS TO PENICILLIN: ICD-10-CM

## 2019-01-23 DIAGNOSIS — Z91.013 ALLERGY TO SEAFOOD: ICD-10-CM

## 2019-01-23 DIAGNOSIS — J45.41 MODERATE PERSISTENT ASTHMA WITH (ACUTE) EXACERBATION: ICD-10-CM

## 2019-01-23 DIAGNOSIS — E66.9 OBESITY, UNSPECIFIED: ICD-10-CM

## 2019-01-23 DIAGNOSIS — O99.513 DISEASES OF THE RESPIRATORY SYSTEM COMPLICATING PREGNANCY, THIRD TRIMESTER: ICD-10-CM

## 2019-01-23 DIAGNOSIS — O23.43 UNSPECIFIED INFECTION OF URINARY TRACT IN PREGNANCY, THIRD TRIMESTER: ICD-10-CM

## 2019-01-23 DIAGNOSIS — K76.0 FATTY (CHANGE OF) LIVER, NOT ELSEWHERE CLASSIFIED: ICD-10-CM

## 2019-01-23 DIAGNOSIS — Z87.891 PERSONAL HISTORY OF NICOTINE DEPENDENCE: ICD-10-CM

## 2019-01-23 DIAGNOSIS — O99.283 ENDOCRINE, NUTRITIONAL AND METABOLIC DISEASES COMPLICATING PREGNANCY, THIRD TRIMESTER: ICD-10-CM

## 2019-01-23 DIAGNOSIS — G43.909 MIGRAINE, UNSPECIFIED, NOT INTRACTABLE, WITHOUT STATUS MIGRAINOSUS: ICD-10-CM

## 2019-01-23 DIAGNOSIS — J10.1 INFLUENZA DUE TO OTHER IDENTIFIED INFLUENZA VIRUS WITH OTHER RESPIRATORY MANIFESTATIONS: ICD-10-CM

## 2019-01-23 DIAGNOSIS — Z91.041 RADIOGRAPHIC DYE ALLERGY STATUS: ICD-10-CM

## 2019-02-16 NOTE — DISCHARGE NOTE ANTEPARTUM - MEDICATION SUMMARY - MEDICATIONS TO STOP TAKING
English I will STOP taking the medications listed below when I get home from the hospital:    ibuprofen 400 mg oral tablet  -- 1 tab(s) by mouth every 6 hours MDD:4 as needed for pain   -- Do not take this drug if you are pregnant.  It is very important that you take or use this exactly as directed.  Do not skip doses or discontinue unless directed by your doctor.  May cause drowsiness or dizziness.  Obtain medical advice before taking any non-prescription drugs as some may affect the action of this medication.  Take with food or milk.    linezolid 600 mg oral tablet  -- 1 tab(s) by mouth 2 times a day   -- Avoid chocolate, wine and cheese while taking this medication.  Finish all this medication unless otherwise directed by prescriber.  Obtain medical advice before taking any non-prescription drugs as some may affect the action of this medication.    Flagyl 500 mg oral tablet  -- 1 tab(s) by mouth every 8 hours   -- Do not drink alcoholic beverages when taking this medication.  Finish all this medication unless otherwise directed by prescriber.  May discolor urine or feces.    oxyCODONE-acetaminophen 5 mg-325 mg oral tablet  -- 1-2  tab(s) by mouth every 4 hours, As Needed for pain MDD:6   -- Caution federal law prohibits the transfer of this drug to any person other  than the person for whom it was prescribed.  May cause drowsiness.  Alcohol may intensify this effect.  Use care when operating dangerous machinery.  This prescription cannot be refilled.  This product contains acetaminophen.  Do not use  with any other product containing acetaminophen to prevent possible liver damage.  Using more of this medication than prescribed may cause serious breathing problems.    Cleocin HCl 300 mg oral capsule  -- 1 cap(s) by mouth 3 times a day   -- Finish all this medication unless otherwise directed by prescriber.  Medication should be taken with plenty of water.    nitrofurantoin macrocrystals 100 mg oral capsule  -- 1 cap(s) by mouth every 12 hours   -- Finish all this medication unless otherwise directed by prescriber.  May discolor urine or feces.  Take with food or milk.    clindamycin 150 mg oral capsule  -- 3 cap(s) by mouth 3 times a day   -- Finish all this medication unless otherwise directed by prescriber.  Medication should be taken with plenty of water.    Percocet 5/325 oral tablet  -- 1 tab(s) by mouth every 6 hours MDD:4 tabs  -- Caution federal law prohibits the transfer of this drug to any person other  than the person for whom it was prescribed.  May cause drowsiness.  Alcohol may intensify this effect.  Use care when operating dangerous machinery.  This prescription cannot be refilled.  This product contains acetaminophen.  Do not use  with any other product containing acetaminophen to prevent possible liver damage.  Using more of this medication than prescribed may cause serious breathing problems.

## 2019-04-18 NOTE — ED PROVIDER NOTE - MUSCULOSKELETAL NEGATIVE STATEMENT, MLM
MEDICARE WELLNESS VISIT NOTE      HISTORY OF PRESENT ILLNESS:   Lissett Mayen presents for her Subsequent Annual Medicare Wellness Visit.   She has concerns which include continues cough, anemia and to follow up on breast cancer and hypertension.      Patient Care Team:  Alicia Fernandez MD as PCP - General (Internal Medicine)  Emiliano Fajardo MD as Orthopedic Surgeon (Orthopedic Surgery)  Beka Hart MD as Cardiologist (Internal Medicine- Interventional Cardiology)  Delonte Esposito MD as Pulmonary Medicine (Internal Medicine - Pulmonary Disease)        Patient Active Problem List   Diagnosis   • Essential hypertension, benign   • Allergic Rhinitis   • Asthma   • Eczema   • Breast Cancer   • Spinal stenosis   • History of total knee arthroplasty   • Paroxysmal atrial fibrillation (CMS/HCC)         Past Medical History:   Diagnosis Date   • A-fib (CMS/HCC)    • Acute infective pericarditis 4/7/2017   • Allergic Rhinitis     seasonal - spring and summer; RX OTC loratidine   • Arthritis    • Arthritis of knees - severe, R>L 1/28/2014    3/20/2017: s/p bilateral knee replacement   • Asthma     triggered by URI's only   • Breast Cancer 1988    Left; s/p mastectomy c axillary node dissection and chemotherapy   • Bronchitis    • Eczema    • Essential hypertension, benign    • Pericardial effusion 01/2017   • Pericarditis 01/2017   • Pleural effusion 01/2017   • Pneumonia    • Spinal stenosis     cervical and lumbar regions   • Urinary incontinence          Past Surgical History:   Procedure Laterality Date   • Appendectomy  age 18   • Ganglion cyst excision      right foot   • Hysterectomy  1993    MALINA with ovaries intact   • Mastectomy  1988    left with axillary node dissection   • Spinal cord decompression  2/11/2010    anterior cervical 3-4, 5-6 decompression   • Total knee replacement Right 8/7/2014    Dr Fajardo. [facility]   • Total knee replacement Left 5/10/2016    Dr Fajardo. [facility]         Social  History     Tobacco Use   • Smoking status: Former Smoker     Packs/day: 1.00     Years: 18.00     Pack years: 18.00     Types: Cigarettes     Last attempt to quit: 1988     Years since quittin.8   • Smokeless tobacco: Never Used   Substance Use Topics   • Alcohol use: Yes     Alcohol/week: 1.2 oz     Types: 2 Standard drinks or equivalent per week     Comment: occasionally   • Drug use: No     Drug use:    Drug Use:    No              Family History   Problem Relation Age of Onset   • Cancer Father         brain cancer   • Heart Mother 50        MI       Current Outpatient Medications   Medication Sig Dispense Refill   • triamcinolone (KENALOG) 0.5 % ointment Apply topically 2 times daily.     • montelukast (SINGULAIR) 10 MG tablet TAKE 1 TABLET BY MOUTH DAILY AT BEDTIME 90 tablet 3   • pantoprazole (PROTONIX) 40 MG tablet Take 1 tablet by mouth 2 times daily. 60 tablet 3   • hydrALAZINE (APRESOLINE) 25 MG tablet Take 1 tablet by mouth 2 times daily. 180 tablet 1   • betamethasone dipropionate (DIPROSONE) 0.05 % cream APPLY  CREAM TOPICALLY TO AFFECTED AREA TWICE DAILY IN UP TO 2 WEEK INTERVALS 45 g 1   • cholecalciferol (VITAMIN D3) 1000 UNITS tablet Take 1,000 Units by mouth daily.     • losartan-hydrochlorothiazide (HYZAAR) 100-25 MG per tablet Take 1 tablet by mouth daily. 90 tablet 4   • VENTOLIN  (90 Base) MCG/ACT inhaler INHALE TWO PUFFS INTO LUNGS EVERY 4 HOURS AS NEEDED FOR SHORTNESS OF BREATH OR WHEEZING 18 g 1   • Multiple Vitamins-Minerals (CENTRUM SILVER 50+WOMEN PO) Take by mouth daily.     • DISPENSE mastectomy bra 1 Device 0   • loratadine (CLARITIN) 10 MG tablet Take 10 mg by mouth nightly as needed. Indications: Hayfever        No current facility-administered medications for this visit.         The following items on the Medicare Health Risk Assessment were found to be positive  11h.) Problems with your hearing:  Often         Vision and Hearing screens: not applicable    Advance  Directive:   The patient has the following documents:  Power of  for Health Care    Cognitive Assessment: no evidence of cognitive dysfunction by direct observation    Recent PHQ 2/9 Score    PHQ 2:  Date Adult PHQ 2 Score   4/18/2019 0       PHQ 9:       DEPRESSION ASSESSMENT/PLAN:  Depression screening is negative no further plan needed.     Body mass index is 30.29 kg/m².    BMI ASSESSMENT/PLAN:  Patient BMI is within normal range.     Needed Screening/Treatment:   Annual mammogram   Shingrix vaccine- needs to check with insurance for coverage  Needed follow up:  None    See orders.   See Patient Instructions section.   Return in about 1 year (around 4/18/2020) for Medicare Wellness Visit, sooner if needed.                         OFFICE VISIT    1. Hypertension: on losartan/hctz. Home readings: 130-138/70's. No headache, blurred/double vision, chest pain, shortness of breath, lower extremity edema; nocturia x 1.     2. Cough: began mid January 2018 and continues. Discontinued metoprolol and colchicine without improvement. Treated with prednisone and steroid inhaler 3/12/2018. Echo was negative for pericardial effusion. Chest CT was negative for an acute process.   Every morning, needs to clear out a lot of clear phlegm that has drained down back of throat overnight. Once she coughs it out, is good for the day. Placed on Singulair 11/2018 and does not believe this is helping. Was diagnosed with exercise induced asthma in her 40's.      3. Breast Cancer: As of June, 2018, Lissett will be 31 years out since her diagnosis. She is feeling very well. Continues with yearly mammograms on opposite breast.     4. Anemia: Hemoglobin tends to hover around low to borderline normal. Does notice shortness of breath going up stairs. Last colonoscopy 2/11/2011 and normal. Had a total hysterectomy in 1993 with ovaries intact.     PHYSICAL EXAM:     GENERAL:  Alert and oriented; no apparent distress.  HEAD:   Normocephalic, atraumatic.  No masses, lesions, tenderness or abnormalities noted.  EYES:  PERRLA (Pupils equal, round, reactive to light and accommodation), EOMI (extraocular movements intact), normal conjunctivae.  THROAT:  MMM (Moist mucous membranes), no tonsillar hypertrophy, no erythema, exudates or petechiae noted.  NECK:  Supple, no cervical or supraclavicular lymphadenopathy and no carotid bruits.  LUNGS:  Clear to auscultation, good aeration, no wheezes, rales or rhonchi. Inspiratory \"squeak\" in upper lungs   HEART:  S1, S2, regular rate and rhythm, no murmur, S3 or S4 auscultated.   EXTREMITIES:  No clubbing, cyanosis or edema.     Sodium (mmol/L)   Date Value   04/09/2019 140     Potassium (mmol/L)   Date Value   04/09/2019 4.0     Chloride (mmol/L)   Date Value   04/09/2019 105     Glucose (mg/dL)   Date Value   04/09/2019 81     CALCIUM (mg/dL)   Date Value   04/09/2019 9.6   01/26/2012 9.6     CO2 (mmol/L)   Date Value   01/26/2012 29     Carbon Dioxide (mmol/L)   Date Value   04/09/2019 29     BUN (mg/dL)   Date Value   04/09/2019 18     Creatinine (mg/dL)   Date Value   04/09/2019 0.79     AST/SGOT (Units/L)   Date Value   04/09/2019 22     ALT/SGPT (Units/L)   Date Value   04/09/2019 22     CHOLESTEROL (mg/dL)   Date Value   03/27/2018 165     HDL (mg/dL)   Date Value   03/27/2018 63     CHOL/HDL (no units)   Date Value   03/27/2018 2.6     TRIGLYCERIDE (mg/dL)   Date Value   03/27/2018 84     CALCULATED LDL (mg/dL)   Date Value   03/27/2018 85     ASSESSMENT:   (I10) Essential hypertension, benign  Comment: Under good control.    Plan: No change in present medication regimen.     (R05) Chronic cough  Comment: secondary to post nasal drip?   Plan: recommend a trial of a nose spray            Niall is willing to give this a try            May need to consider referral to ENT for a laryngoscopy    (C50.912) Malignant neoplasm of left female breast, unspecified estrogen receptor status, unspecified  site of breast (CMS/[facility])  Comment: celebrating 31 years of being cancer-free; Niall wondering if she should continue yearly mammograms  Plan: given history of breast cancer, would recommend continued yearly mammograms    (D64.9) Anemia, unspecified type  Comment: symptomatic and given size of red cells, appears to be iron deficiency  Plan: CBC NO DIFFERENTIAL, FERRITIN, IRON AND TIBC            If this is confirmed, consider further workup including EGD, colonoscopy and pelvic ultrasound.          no back pain, no gout, no musculoskeletal pain, no neck pain, and no weakness.

## 2019-04-20 ENCOUNTER — INPATIENT (INPATIENT)
Facility: HOSPITAL | Age: 39
LOS: 4 days | Discharge: HOME | End: 2019-04-25
Attending: OBSTETRICS & GYNECOLOGY | Admitting: OBSTETRICS & GYNECOLOGY
Payer: MEDICAID

## 2019-04-20 ENCOUNTER — RESULT REVIEW (OUTPATIENT)
Age: 39
End: 2019-04-20

## 2019-04-20 VITALS — HEIGHT: 67 IN | WEIGHT: 293 LBS

## 2019-04-20 DIAGNOSIS — Z98.89 OTHER SPECIFIED POSTPROCEDURAL STATES: Chronic | ICD-10-CM

## 2019-04-20 DIAGNOSIS — O26.899 OTHER SPECIFIED PREGNANCY RELATED CONDITIONS, UNSPECIFIED TRIMESTER: ICD-10-CM

## 2019-04-20 DIAGNOSIS — Z98.890 OTHER SPECIFIED POSTPROCEDURAL STATES: Chronic | ICD-10-CM

## 2019-04-20 DIAGNOSIS — Z3A.00 WEEKS OF GESTATION OF PREGNANCY NOT SPECIFIED: ICD-10-CM

## 2019-04-20 LAB
ALBUMIN SERPL ELPH-MCNC: 2.7 G/DL — LOW (ref 3.3–5)
ALBUMIN SERPL ELPH-MCNC: 3.4 G/DL — SIGNIFICANT CHANGE UP (ref 3.3–5)
ALP SERPL-CCNC: 123 U/L — HIGH (ref 40–120)
ALP SERPL-CCNC: 176 U/L — HIGH (ref 40–120)
ALT FLD-CCNC: 8 U/L — LOW (ref 10–45)
ALT FLD-CCNC: 9 U/L — LOW (ref 10–45)
ANION GAP SERPL CALC-SCNC: 11 MMOL/L — SIGNIFICANT CHANGE UP (ref 5–17)
ANION GAP SERPL CALC-SCNC: 16 MMOL/L — SIGNIFICANT CHANGE UP (ref 5–17)
APPEARANCE UR: CLEAR — SIGNIFICANT CHANGE UP
APTT BLD: 26.8 SEC — LOW (ref 27.5–36.3)
APTT BLD: 27.5 SEC — SIGNIFICANT CHANGE UP (ref 27.5–36.3)
AST SERPL-CCNC: 17 U/L — SIGNIFICANT CHANGE UP (ref 10–40)
AST SERPL-CCNC: 17 U/L — SIGNIFICANT CHANGE UP (ref 10–40)
BASOPHILS # BLD AUTO: 0.02 K/UL — SIGNIFICANT CHANGE UP (ref 0–0.2)
BASOPHILS # BLD AUTO: 0.02 K/UL — SIGNIFICANT CHANGE UP (ref 0–0.2)
BASOPHILS NFR BLD AUTO: 0.1 % — SIGNIFICANT CHANGE UP (ref 0–2)
BASOPHILS NFR BLD AUTO: 0.2 % — SIGNIFICANT CHANGE UP (ref 0–2)
BILIRUB SERPL-MCNC: 0.2 MG/DL — SIGNIFICANT CHANGE UP (ref 0.2–1.2)
BILIRUB SERPL-MCNC: 0.3 MG/DL — SIGNIFICANT CHANGE UP (ref 0.2–1.2)
BILIRUB UR-MCNC: NEGATIVE — SIGNIFICANT CHANGE UP
BLD GP AB SCN SERPL QL: NEGATIVE — SIGNIFICANT CHANGE UP
BLD GP AB SCN SERPL QL: NEGATIVE — SIGNIFICANT CHANGE UP
BUN SERPL-MCNC: 5 MG/DL — LOW (ref 7–23)
BUN SERPL-MCNC: 6 MG/DL — LOW (ref 7–23)
CALCIUM SERPL-MCNC: 7.2 MG/DL — LOW (ref 8.4–10.5)
CALCIUM SERPL-MCNC: 8.5 MG/DL — SIGNIFICANT CHANGE UP (ref 8.4–10.5)
CHLORIDE SERPL-SCNC: 102 MMOL/L — SIGNIFICANT CHANGE UP (ref 96–108)
CHLORIDE SERPL-SCNC: 102 MMOL/L — SIGNIFICANT CHANGE UP (ref 96–108)
CO2 SERPL-SCNC: 18 MMOL/L — LOW (ref 22–31)
CO2 SERPL-SCNC: 21 MMOL/L — LOW (ref 22–31)
COLOR SPEC: YELLOW — SIGNIFICANT CHANGE UP
CREAT ?TM UR-MCNC: 39 MG/DL — SIGNIFICANT CHANGE UP
CREAT SERPL-MCNC: 0.63 MG/DL — SIGNIFICANT CHANGE UP (ref 0.5–1.3)
CREAT SERPL-MCNC: 0.69 MG/DL — SIGNIFICANT CHANGE UP (ref 0.5–1.3)
DIFF PNL FLD: ABNORMAL
EOSINOPHIL # BLD AUTO: 0 K/UL — SIGNIFICANT CHANGE UP (ref 0–0.5)
EOSINOPHIL # BLD AUTO: 0.07 K/UL — SIGNIFICANT CHANGE UP (ref 0–0.5)
EOSINOPHIL NFR BLD AUTO: 0 % — SIGNIFICANT CHANGE UP (ref 0–6)
EOSINOPHIL NFR BLD AUTO: 0.5 % — SIGNIFICANT CHANGE UP (ref 0–6)
FIBRINOGEN PPP-MCNC: 430 MG/DL — SIGNIFICANT CHANGE UP (ref 258–438)
FIBRINOGEN PPP-MCNC: 655 MG/DL — HIGH (ref 258–438)
GLUCOSE BLDC GLUCOMTR-MCNC: 111 MG/DL — HIGH (ref 70–99)
GLUCOSE BLDC GLUCOMTR-MCNC: 193 MG/DL — HIGH (ref 70–99)
GLUCOSE BLDC GLUCOMTR-MCNC: 201 MG/DL — HIGH (ref 70–99)
GLUCOSE BLDC GLUCOMTR-MCNC: 224 MG/DL — HIGH (ref 70–99)
GLUCOSE SERPL-MCNC: 100 MG/DL — HIGH (ref 70–99)
GLUCOSE SERPL-MCNC: 231 MG/DL — HIGH (ref 70–99)
GLUCOSE UR QL: NEGATIVE — SIGNIFICANT CHANGE UP
HCT VFR BLD CALC: 26.7 % — LOW (ref 34.5–45)
HCT VFR BLD CALC: 30 % — LOW (ref 34.5–45)
HCT VFR BLD CALC: 38.4 % — SIGNIFICANT CHANGE UP (ref 34.5–45)
HGB BLD-MCNC: 12.7 G/DL — SIGNIFICANT CHANGE UP (ref 11.5–15.5)
HGB BLD-MCNC: 8.5 G/DL — LOW (ref 11.5–15.5)
HGB BLD-MCNC: 9.7 G/DL — LOW (ref 11.5–15.5)
IMM GRANULOCYTES NFR BLD AUTO: 0.5 % — SIGNIFICANT CHANGE UP (ref 0–1.5)
IMM GRANULOCYTES NFR BLD AUTO: 0.5 % — SIGNIFICANT CHANGE UP (ref 0–1.5)
INR BLD: 0.98 — SIGNIFICANT CHANGE UP (ref 0.88–1.16)
INR BLD: 1.03 — SIGNIFICANT CHANGE UP (ref 0.88–1.16)
KETONES UR-MCNC: 15 MG/DL
LDH SERPL L TO P-CCNC: 236 U/L — SIGNIFICANT CHANGE UP (ref 50–242)
LDH SERPL L TO P-CCNC: 448 U/L — HIGH (ref 50–242)
LEUKOCYTE ESTERASE UR-ACNC: NEGATIVE — SIGNIFICANT CHANGE UP
LYMPHOCYTES # BLD AUTO: 1.84 K/UL — SIGNIFICANT CHANGE UP (ref 1–3.3)
LYMPHOCYTES # BLD AUTO: 23.8 % — SIGNIFICANT CHANGE UP (ref 13–44)
LYMPHOCYTES # BLD AUTO: 3.08 K/UL — SIGNIFICANT CHANGE UP (ref 1–3.3)
LYMPHOCYTES # BLD AUTO: 8.1 % — LOW (ref 13–44)
MAGNESIUM SERPL-MCNC: 5.4 MG/DL — HIGH (ref 1.6–2.6)
MCHC RBC-ENTMCNC: 26.6 PG — LOW (ref 27–34)
MCHC RBC-ENTMCNC: 26.7 PG — LOW (ref 27–34)
MCHC RBC-ENTMCNC: 26.7 PG — LOW (ref 27–34)
MCHC RBC-ENTMCNC: 31.8 GM/DL — LOW (ref 32–36)
MCHC RBC-ENTMCNC: 32.3 GM/DL — SIGNIFICANT CHANGE UP (ref 32–36)
MCHC RBC-ENTMCNC: 33.1 GM/DL — SIGNIFICANT CHANGE UP (ref 32–36)
MCV RBC AUTO: 80.5 FL — SIGNIFICANT CHANGE UP (ref 80–100)
MCV RBC AUTO: 82.6 FL — SIGNIFICANT CHANGE UP (ref 80–100)
MCV RBC AUTO: 84 FL — SIGNIFICANT CHANGE UP (ref 80–100)
MONOCYTES # BLD AUTO: 0.99 K/UL — HIGH (ref 0–0.9)
MONOCYTES # BLD AUTO: 1.14 K/UL — HIGH (ref 0–0.9)
MONOCYTES NFR BLD AUTO: 4.4 % — SIGNIFICANT CHANGE UP (ref 2–14)
MONOCYTES NFR BLD AUTO: 8.8 % — SIGNIFICANT CHANGE UP (ref 2–14)
NEUTROPHILS # BLD AUTO: 19.64 K/UL — HIGH (ref 1.8–7.4)
NEUTROPHILS # BLD AUTO: 8.59 K/UL — HIGH (ref 1.8–7.4)
NEUTROPHILS NFR BLD AUTO: 66.2 % — SIGNIFICANT CHANGE UP (ref 43–77)
NEUTROPHILS NFR BLD AUTO: 86.9 % — HIGH (ref 43–77)
NITRITE UR-MCNC: NEGATIVE — SIGNIFICANT CHANGE UP
NRBC # BLD: 0 /100 WBCS — SIGNIFICANT CHANGE UP (ref 0–0)
PH UR: 5.5 — SIGNIFICANT CHANGE UP (ref 5–8)
PLATELET # BLD AUTO: 269 K/UL — SIGNIFICANT CHANGE UP (ref 150–400)
PLATELET # BLD AUTO: 342 K/UL — SIGNIFICANT CHANGE UP (ref 150–400)
PLATELET # BLD AUTO: 344 K/UL — SIGNIFICANT CHANGE UP (ref 150–400)
POTASSIUM SERPL-MCNC: 2.9 MMOL/L — CRITICAL LOW (ref 3.5–5.3)
POTASSIUM SERPL-MCNC: 3.3 MMOL/L — LOW (ref 3.5–5.3)
POTASSIUM SERPL-SCNC: 2.9 MMOL/L — CRITICAL LOW (ref 3.5–5.3)
POTASSIUM SERPL-SCNC: 3.3 MMOL/L — LOW (ref 3.5–5.3)
PROT ?TM UR-MCNC: 10 MG/DL — SIGNIFICANT CHANGE UP (ref 0–12)
PROT SERPL-MCNC: 5.6 G/DL — LOW (ref 6–8.3)
PROT SERPL-MCNC: 7.2 G/DL — SIGNIFICANT CHANGE UP (ref 6–8.3)
PROT UR-MCNC: NEGATIVE MG/DL — SIGNIFICANT CHANGE UP
PROT/CREAT UR-RTO: 0.3 RATIO — HIGH (ref 0–0.2)
PROTHROM AB SERPL-ACNC: 11.1 SEC — SIGNIFICANT CHANGE UP (ref 10–12.9)
PROTHROM AB SERPL-ACNC: 11.7 SEC — SIGNIFICANT CHANGE UP (ref 10–12.9)
RBC # BLD: 3.18 M/UL — LOW (ref 3.8–5.2)
RBC # BLD: 3.63 M/UL — LOW (ref 3.8–5.2)
RBC # BLD: 4.77 M/UL — SIGNIFICANT CHANGE UP (ref 3.8–5.2)
RBC # FLD: 15.5 % — HIGH (ref 10.3–14.5)
RBC # FLD: 15.5 % — HIGH (ref 10.3–14.5)
RBC # FLD: 15.6 % — HIGH (ref 10.3–14.5)
RH IG SCN BLD-IMP: NEGATIVE — SIGNIFICANT CHANGE UP
RH IG SCN BLD-IMP: NEGATIVE — SIGNIFICANT CHANGE UP
SODIUM SERPL-SCNC: 134 MMOL/L — LOW (ref 135–145)
SODIUM SERPL-SCNC: 136 MMOL/L — SIGNIFICANT CHANGE UP (ref 135–145)
SP GR SPEC: 1.02 — SIGNIFICANT CHANGE UP (ref 1–1.03)
T PALLIDUM AB TITR SER: NEGATIVE — SIGNIFICANT CHANGE UP
URATE SERPL-MCNC: 5.2 MG/DL — SIGNIFICANT CHANGE UP (ref 2.5–7)
URATE SERPL-MCNC: 5.5 MG/DL — SIGNIFICANT CHANGE UP (ref 2.5–7)
UROBILINOGEN FLD QL: 0.2 E.U./DL — SIGNIFICANT CHANGE UP
WBC # BLD: 12.96 K/UL — HIGH (ref 3.8–10.5)
WBC # BLD: 20.78 K/UL — HIGH (ref 3.8–10.5)
WBC # BLD: 22.61 K/UL — HIGH (ref 3.8–10.5)
WBC # FLD AUTO: 12.96 K/UL — HIGH (ref 3.8–10.5)
WBC # FLD AUTO: 20.78 K/UL — HIGH (ref 3.8–10.5)
WBC # FLD AUTO: 22.61 K/UL — HIGH (ref 3.8–10.5)

## 2019-04-20 RX ORDER — OXYTOCIN 10 UNIT/ML
41.67 VIAL (ML) INJECTION
Qty: 20 | Refills: 0 | Status: DISCONTINUED | OUTPATIENT
Start: 2019-04-20 | End: 2019-04-25

## 2019-04-20 RX ORDER — DEXTROSE 50 % IN WATER 50 %
25 SYRINGE (ML) INTRAVENOUS ONCE
Qty: 0 | Refills: 0 | Status: DISCONTINUED | OUTPATIENT
Start: 2019-04-20 | End: 2019-04-25

## 2019-04-20 RX ORDER — GLYCERIN ADULT
1 SUPPOSITORY, RECTAL RECTAL AT BEDTIME
Qty: 0 | Refills: 0 | Status: DISCONTINUED | OUTPATIENT
Start: 2019-04-20 | End: 2019-04-25

## 2019-04-20 RX ORDER — SODIUM CHLORIDE 9 MG/ML
1000 INJECTION, SOLUTION INTRAVENOUS
Qty: 0 | Refills: 0 | Status: DISCONTINUED | OUTPATIENT
Start: 2019-04-20 | End: 2019-04-25

## 2019-04-20 RX ORDER — ONDANSETRON 8 MG/1
4 TABLET, FILM COATED ORAL EVERY 6 HOURS
Qty: 0 | Refills: 0 | Status: DISCONTINUED | OUTPATIENT
Start: 2019-04-20 | End: 2019-04-20

## 2019-04-20 RX ORDER — POTASSIUM CHLORIDE 20 MEQ
10 PACKET (EA) ORAL
Qty: 0 | Refills: 0 | Status: COMPLETED | OUTPATIENT
Start: 2019-04-20 | End: 2019-04-20

## 2019-04-20 RX ORDER — GENTAMICIN SULFATE 40 MG/ML
310 VIAL (ML) INJECTION ONCE
Qty: 0 | Refills: 0 | Status: COMPLETED | OUTPATIENT
Start: 2019-04-20 | End: 2019-04-20

## 2019-04-20 RX ORDER — GLUCAGON INJECTION, SOLUTION 0.5 MG/.1ML
1 INJECTION, SOLUTION SUBCUTANEOUS ONCE
Qty: 0 | Refills: 0 | Status: DISCONTINUED | OUTPATIENT
Start: 2019-04-20 | End: 2019-04-25

## 2019-04-20 RX ORDER — MAGNESIUM SULFATE 500 MG/ML
2 VIAL (ML) INJECTION
Qty: 40 | Refills: 0 | Status: DISCONTINUED | OUTPATIENT
Start: 2019-04-20 | End: 2019-04-21

## 2019-04-20 RX ORDER — SODIUM CHLORIDE 9 MG/ML
1000 INJECTION, SOLUTION INTRAVENOUS ONCE
Qty: 0 | Refills: 0 | Status: COMPLETED | OUTPATIENT
Start: 2019-04-20 | End: 2019-04-20

## 2019-04-20 RX ORDER — MORPHINE SULFATE 50 MG/1
0.2 CAPSULE, EXTENDED RELEASE ORAL ONCE
Qty: 0 | Refills: 0 | Status: DISCONTINUED | OUTPATIENT
Start: 2019-04-20 | End: 2019-04-20

## 2019-04-20 RX ORDER — METOCLOPRAMIDE HCL 10 MG
10 TABLET ORAL ONCE
Qty: 0 | Refills: 0 | Status: COMPLETED | OUTPATIENT
Start: 2019-04-20 | End: 2019-04-20

## 2019-04-20 RX ORDER — LABETALOL HCL 100 MG
40 TABLET ORAL ONCE
Qty: 0 | Refills: 0 | Status: COMPLETED | OUTPATIENT
Start: 2019-04-20 | End: 2019-04-20

## 2019-04-20 RX ORDER — SODIUM CHLORIDE 9 MG/ML
1000 INJECTION, SOLUTION INTRAVENOUS
Qty: 0 | Refills: 0 | Status: DISCONTINUED | OUTPATIENT
Start: 2019-04-20 | End: 2019-04-20

## 2019-04-20 RX ORDER — FERROUS SULFATE 325(65) MG
325 TABLET ORAL DAILY
Qty: 0 | Refills: 0 | Status: DISCONTINUED | OUTPATIENT
Start: 2019-04-20 | End: 2019-04-25

## 2019-04-20 RX ORDER — DEXTROSE 50 % IN WATER 50 %
15 SYRINGE (ML) INTRAVENOUS ONCE
Qty: 0 | Refills: 0 | Status: DISCONTINUED | OUTPATIENT
Start: 2019-04-20 | End: 2019-04-25

## 2019-04-20 RX ORDER — MAGNESIUM SULFATE 500 MG/ML
4 VIAL (ML) INJECTION ONCE
Qty: 0 | Refills: 0 | Status: COMPLETED | OUTPATIENT
Start: 2019-04-20 | End: 2019-04-20

## 2019-04-20 RX ORDER — LANOLIN
1 OINTMENT (GRAM) TOPICAL
Qty: 0 | Refills: 0 | Status: DISCONTINUED | OUTPATIENT
Start: 2019-04-20 | End: 2019-04-25

## 2019-04-20 RX ORDER — HEPARIN SODIUM 5000 [USP'U]/ML
7500 INJECTION INTRAVENOUS; SUBCUTANEOUS EVERY 8 HOURS
Qty: 0 | Refills: 0 | Status: DISCONTINUED | OUTPATIENT
Start: 2019-04-20 | End: 2019-04-25

## 2019-04-20 RX ORDER — OXYCODONE AND ACETAMINOPHEN 5; 325 MG/1; MG/1
1 TABLET ORAL
Qty: 0 | Refills: 0 | Status: DISCONTINUED | OUTPATIENT
Start: 2019-04-20 | End: 2019-04-25

## 2019-04-20 RX ORDER — DEXTROSE 50 % IN WATER 50 %
12.5 SYRINGE (ML) INTRAVENOUS ONCE
Qty: 0 | Refills: 0 | Status: DISCONTINUED | OUTPATIENT
Start: 2019-04-20 | End: 2019-04-25

## 2019-04-20 RX ORDER — IBUPROFEN 200 MG
600 TABLET ORAL EVERY 6 HOURS
Qty: 0 | Refills: 0 | Status: DISCONTINUED | OUTPATIENT
Start: 2019-04-20 | End: 2019-04-25

## 2019-04-20 RX ORDER — ONDANSETRON 8 MG/1
8 TABLET, FILM COATED ORAL ONCE
Qty: 0 | Refills: 0 | Status: COMPLETED | OUTPATIENT
Start: 2019-04-20 | End: 2019-04-20

## 2019-04-20 RX ORDER — OXYCODONE AND ACETAMINOPHEN 5; 325 MG/1; MG/1
2 TABLET ORAL EVERY 6 HOURS
Qty: 0 | Refills: 0 | Status: DISCONTINUED | OUTPATIENT
Start: 2019-04-20 | End: 2019-04-25

## 2019-04-20 RX ORDER — HYDROMORPHONE HYDROCHLORIDE 2 MG/ML
0.5 INJECTION INTRAMUSCULAR; INTRAVENOUS; SUBCUTANEOUS
Qty: 0 | Refills: 0 | Status: DISCONTINUED | OUTPATIENT
Start: 2019-04-20 | End: 2019-04-20

## 2019-04-20 RX ORDER — INSULIN LISPRO 100/ML
VIAL (ML) SUBCUTANEOUS
Qty: 0 | Refills: 0 | Status: DISCONTINUED | OUTPATIENT
Start: 2019-04-20 | End: 2019-04-25

## 2019-04-20 RX ORDER — DIPHENHYDRAMINE HCL 50 MG
25 CAPSULE ORAL EVERY 6 HOURS
Qty: 0 | Refills: 0 | Status: DISCONTINUED | OUTPATIENT
Start: 2019-04-20 | End: 2019-04-25

## 2019-04-20 RX ORDER — CITRIC ACID/SODIUM CITRATE 300-500 MG
30 SOLUTION, ORAL ORAL ONCE
Qty: 0 | Refills: 0 | Status: COMPLETED | OUTPATIENT
Start: 2019-04-20 | End: 2019-04-20

## 2019-04-20 RX ORDER — HYDRALAZINE HCL 50 MG
10 TABLET ORAL ONCE
Qty: 0 | Refills: 0 | Status: COMPLETED | OUTPATIENT
Start: 2019-04-20 | End: 2019-04-20

## 2019-04-20 RX ORDER — OXYTOCIN 10 UNIT/ML
333.33 VIAL (ML) INJECTION
Qty: 20 | Refills: 0 | Status: DISCONTINUED | OUTPATIENT
Start: 2019-04-20 | End: 2019-04-25

## 2019-04-20 RX ORDER — LABETALOL HCL 100 MG
200 TABLET ORAL EVERY 12 HOURS
Qty: 0 | Refills: 0 | Status: DISCONTINUED | OUTPATIENT
Start: 2019-04-20 | End: 2019-04-25

## 2019-04-20 RX ORDER — SIMETHICONE 80 MG/1
80 TABLET, CHEWABLE ORAL EVERY 4 HOURS
Qty: 0 | Refills: 0 | Status: DISCONTINUED | OUTPATIENT
Start: 2019-04-20 | End: 2019-04-25

## 2019-04-20 RX ORDER — POTASSIUM CHLORIDE 20 MEQ
20 PACKET (EA) ORAL
Qty: 0 | Refills: 0 | Status: DISCONTINUED | OUTPATIENT
Start: 2019-04-20 | End: 2019-04-20

## 2019-04-20 RX ORDER — NALOXONE HYDROCHLORIDE 4 MG/.1ML
0.1 SPRAY NASAL
Qty: 0 | Refills: 0 | Status: DISCONTINUED | OUTPATIENT
Start: 2019-04-20 | End: 2019-04-20

## 2019-04-20 RX ORDER — TETANUS TOXOID, REDUCED DIPHTHERIA TOXOID AND ACELLULAR PERTUSSIS VACCINE, ADSORBED 5; 2.5; 8; 8; 2.5 [IU]/.5ML; [IU]/.5ML; UG/.5ML; UG/.5ML; UG/.5ML
0.5 SUSPENSION INTRAMUSCULAR ONCE
Qty: 0 | Refills: 0 | Status: COMPLETED | OUTPATIENT
Start: 2019-04-20

## 2019-04-20 RX ORDER — LABETALOL HCL 100 MG
80 TABLET ORAL ONCE
Qty: 0 | Refills: 0 | Status: COMPLETED | OUTPATIENT
Start: 2019-04-20 | End: 2019-04-20

## 2019-04-20 RX ORDER — DOCUSATE SODIUM 100 MG
100 CAPSULE ORAL
Qty: 0 | Refills: 0 | Status: DISCONTINUED | OUTPATIENT
Start: 2019-04-20 | End: 2019-04-25

## 2019-04-20 RX ORDER — AZITHROMYCIN 500 MG/1
500 TABLET, FILM COATED ORAL ONCE
Qty: 0 | Refills: 0 | Status: DISCONTINUED | OUTPATIENT
Start: 2019-04-20 | End: 2019-04-20

## 2019-04-20 RX ORDER — LABETALOL HCL 100 MG
20 TABLET ORAL ONCE
Qty: 0 | Refills: 0 | Status: COMPLETED | OUTPATIENT
Start: 2019-04-20 | End: 2019-04-20

## 2019-04-20 RX ORDER — ACETAMINOPHEN 500 MG
1000 TABLET ORAL ONCE
Qty: 0 | Refills: 0 | Status: COMPLETED | OUTPATIENT
Start: 2019-04-20 | End: 2019-04-20

## 2019-04-20 RX ADMIN — Medication 1000 MILLIGRAM(S): at 20:30

## 2019-04-20 RX ADMIN — Medication 40 MILLIGRAM(S): at 14:48

## 2019-04-20 RX ADMIN — ONDANSETRON 8 MILLIGRAM(S): 8 TABLET, FILM COATED ORAL at 18:57

## 2019-04-20 RX ADMIN — Medication 4: at 21:00

## 2019-04-20 RX ADMIN — Medication 100 MILLIEQUIVALENT(S): at 12:13

## 2019-04-20 RX ADMIN — SODIUM CHLORIDE 2000 MILLILITER(S): 9 INJECTION, SOLUTION INTRAVENOUS at 05:00

## 2019-04-20 RX ADMIN — Medication 200 MILLIGRAM(S): at 06:01

## 2019-04-20 RX ADMIN — Medication 10 MILLIGRAM(S): at 10:20

## 2019-04-20 RX ADMIN — Medication 50 GM/HR: at 12:01

## 2019-04-20 RX ADMIN — Medication 100 MILLIEQUIVALENT(S): at 20:28

## 2019-04-20 RX ADMIN — Medication 125 MILLIUNIT(S)/MIN: at 09:10

## 2019-04-20 RX ADMIN — Medication 200 MILLIGRAM(S): at 19:28

## 2019-04-20 RX ADMIN — Medication 4: at 16:34

## 2019-04-20 RX ADMIN — Medication 20 MILLIGRAM(S): at 12:46

## 2019-04-20 RX ADMIN — HEPARIN SODIUM 7500 UNIT(S): 5000 INJECTION INTRAVENOUS; SUBCUTANEOUS at 21:16

## 2019-04-20 RX ADMIN — Medication 10 MILLIGRAM(S): at 11:54

## 2019-04-20 RX ADMIN — Medication 2: at 11:42

## 2019-04-20 RX ADMIN — Medication 30 MILLILITER(S): at 06:20

## 2019-04-20 RX ADMIN — Medication 80 MILLIGRAM(S): at 16:10

## 2019-04-20 RX ADMIN — Medication 100 MILLIGRAM(S): at 05:30

## 2019-04-20 RX ADMIN — OXYCODONE AND ACETAMINOPHEN 2 TABLET(S): 5; 325 TABLET ORAL at 15:52

## 2019-04-20 RX ADMIN — Medication 100 MILLIEQUIVALENT(S): at 19:19

## 2019-04-20 RX ADMIN — Medication 400 MILLIGRAM(S): at 19:18

## 2019-04-20 RX ADMIN — Medication 125 MILLIUNIT(S)/MIN: at 15:51

## 2019-04-20 RX ADMIN — Medication 300 GRAM(S): at 11:39

## 2019-04-20 NOTE — PROVIDER CONTACT NOTE (CHANGE IN STATUS NOTIFICATION) - ASSESSMENT
Pt. states she feels dizzy.  Blood pressure reading 135/64, HR 62, SaO2 98%, RR 18. Finger stick 224 mg/dl.

## 2019-04-20 NOTE — CHART NOTE - NSCHARTNOTEFT_GEN_A_CORE
Repeat /80. Will give IV push labetalol 80mg now. Patient had another episode of vomiting and is unable to tolerate PO.

## 2019-04-20 NOTE — PROGRESS NOTE ADULT - ASSESSMENT
A&P: 39y Female   s/p c/s POD0 complicated by preeclampsia with severe features.      1. Preeclampsia:   Continue IV Magnesium @2G/hr for 24 hrs post delivery.   Magnesium clinical checks and serum assay q6 hrs.  Next Mg check @ 0600  F/u midnight magnesium level]  BP controlled  Stable for transfer from recovery room to postpartum unit  2. GI: Diet: Clears as tolerated  3. Neuro: PO pain medications PRN, hold NSAIDs  4. : strict Is and Os, holley in place

## 2019-04-20 NOTE — PROVIDER CONTACT NOTE (OTHER) - SITUATION
Patient noted to have elevated blood pressures upon arrival to Recovery Room; initially patient was holding  and cuff was inappropriately loose; cuff readjusted and BPs to be reevaluated

## 2019-04-20 NOTE — PROGRESS NOTE ADULT - SUBJECTIVE AND OBJECTIVE BOX
Patient evaluated at bedside for clinical magnesium check.     Patient reports feeling "cold on top" above her breastbone, and "hot on the bottom." Has a warming blanket on bottom extremities but not top. Temp has been 95.6 but patient is eating ice and temp has been taken orally. She denies visual disturbances including scotoma, denies right upper quadrant pain. Also denies nausea/vomiting/epigastric pain/shortness of breath. Pain well controlled.      T(C): 95.6  HR: 64 (04-20-19 @ 22:30) (56 - 74)  BP: 123/70 (04-20-19 @ 22:30) (123/70 - 202/86)  RR: 18 (04-20-19 @ 22:30) (15 - 22)  SpO2: 99% (04-20-19 @ 22:30) (95% - 100%)    Gen: NAD  Pulm: CTAB  Abd: soft, nontender, no rebound or guarding, no epigastric tenderness, liver nonpalpable +BS, fundus firm  : Cook in place  Ext: Reflexes +2                          9.7    22.61 )-----------( 344      ( 20 Apr 2019 18:43 )             30.0     04-20    134<L>  |  102  |  6<L>  ----------------------------<  231<H>  3.3<L>   |  21<L>  |  0.63    Ca    7.2<L>      20 Apr 2019 18:43  Mg     5.4     04-20    TPro  5.6<L>  /  Alb  2.7<L>  /  TBili  0.2  /  DBili  x   /  AST  17  /  ALT  8<L>  /  AlkPhos  123<H>  04-20 04-19-19 @ 07:01  -  04-20-19 @ 07:00  --------------------------------------------------------  IN: 50 mL / OUT: 0 mL / NET: 50 mL    04-20-19 @ 07:01  -  04-20-19 @ 23:23  --------------------------------------------------------  IN: 1550 mL / OUT: 2901 mL / NET: -7421 mL

## 2019-04-20 NOTE — PROGRESS NOTE ADULT - SUBJECTIVE AND OBJECTIVE BOX
Patient evaluated at bedside for clinical magnesium check. Patient c/o HA, dizziness, and nausea/vomiting. She has had multiple episodes of vomiting post-op and is NPO. Will order Zofran 8mg IVP now. Her HA initially somewhat improved with Motrin but is still persistent. Will give IV tylenol 1g. Denies chest pain, SOB, RUQ pain.     T(C): 36.1 (04-20-19 @ 09:10), Max: 36.1 (04-20-19 @ 09:10)  HR: 57 (04-20-19 @ 17:25) (56 - 74)  BP: 134/64 (04-20-19 @ 17:25) (134/64 - 202/86)  RR: 18 (04-20-19 @ 17:25) (15 - 22)  SpO2: 100% (04-20-19 @ 17:25) (95% - 100%)  Wt(kg): --    Gen: NAD  Pulm: CTAB  Abd: soft, nontender, no rebound or guarding, no epigastric tenderness, liver nonpalpable +BS, fundus palpated   : Cook in place  Ext: Reflexes 1+ bilaterally                          9.7    22.61 )-----------( 344      ( 20 Apr 2019 18:43 )             30.0     04-20    136  |  102  |  5<L>  ----------------------------<  100<H>  2.9<LL>   |  18<L>  |  0.69    Ca    8.5      20 Apr 2019 05:33    TPro  7.2  /  Alb  3.4  /  TBili  0.3  /  DBili  x   /  AST  17  /  ALT  9<L>  /  AlkPhos  176<H>  04-20 04-19-19 @ 07:01  -  04-20-19 @ 07:00  --------------------------------------------------------  IN: 50 mL / OUT: 0 mL / NET: 50 mL    04-20-19 @ 07:01  -  04-20-19 @ 18:50  --------------------------------------------------------  IN: 575 mL / OUT: 2635 mL / NET: -2060 mL            MEDICATIONS  (STANDING):  dextrose 5%. 1000 milliLiter(s) (50 mL/Hr) IV Continuous <Continuous>  dextrose 50% Injectable 12.5 Gram(s) IV Push once  dextrose 50% Injectable 25 Gram(s) IV Push once  dextrose 50% Injectable 25 Gram(s) IV Push once  diphtheria/tetanus/pertussis (acellular) Vaccine (ADAcel) 0.5 milliLiter(s) IntraMuscular once  ferrous    sulfate 325 milliGRAM(s) Oral daily  heparin  Injectable 7500 Unit(s) SubCutaneous every 8 hours  ibuprofen  Tablet. 600 milliGRAM(s) Oral every 6 hours  insulin lispro (HumaLOG) corrective regimen sliding scale   SubCutaneous Before meals and at bedtime  labetalol 200 milliGRAM(s) Oral every 12 hours  labetalol Injectable 80 milliGRAM(s) IV Push once  lactated ringers. 1000 milliLiter(s) (125 mL/Hr) IV Continuous <Continuous>  lactated ringers. 1000 milliLiter(s) (125 mL/Hr) IV Continuous <Continuous>  magnesium sulfate Infusion 2 Gm/Hr (50 mL/Hr) IV Continuous <Continuous>  ondansetron Injectable 8 milliGRAM(s) IV Push once  oxytocin Infusion 333.333 milliUNIT(s)/Min (1000 mL/Hr) IV Continuous <Continuous>  oxytocin Infusion 41.667 milliUNIT(s)/Min (125 mL/Hr) IV Continuous <Continuous>  oxytocin Infusion 41.667 milliUNIT(s)/Min (125 mL/Hr) IV Continuous <Continuous>  potassium chloride  10 mEq/100 mL IVPB 10 milliEquivalent(s) IV Intermittent every 1 hour  prenatal multivitamin 1 Tablet(s) Oral daily    MEDICATIONS  (PRN):  dextrose 40% Gel 15 Gram(s) Oral once PRN Blood Glucose LESS THAN 70 milliGRAM(s)/deciliter  diphenhydrAMINE 25 milliGRAM(s) Oral every 6 hours PRN Itching  docusate sodium 100 milliGRAM(s) Oral two times a day PRN Stool Softening  glucagon  Injectable 1 milliGRAM(s) IntraMuscular once PRN Glucose LESS THAN 70 milligrams/deciliter  glycerin Suppository - Adult 1 Suppository(s) Rectal at bedtime PRN Constipation  lanolin Ointment 1 Application(s) Topical every 3 hours PRN Sore Nipples  oxyCODONE    5 mG/acetaminophen 325 mG 1 Tablet(s) Oral every 3 hours PRN Moderate Pain (4 - 6)  oxyCODONE    5 mG/acetaminophen 325 mG 2 Tablet(s) Oral every 6 hours PRN Severe Pain (7 - 10)  simethicone 80 milliGRAM(s) Chew every 4 hours PRN Gas

## 2019-04-20 NOTE — PROGRESS NOTE ADULT - ASSESSMENT
A&P  38yo s/p repeat c/s at 41+wks c/b siPEC w/ SF and DM2  DM2: ISS as needed, NPO   1. Preeclampsia:   Continue IV Magnesium @2G/hr for 24 hrs post delivery.   Magnesium clinical checks and serum assay q6 hrs.  Next Mg check @ 00:00 on 4/21  Will give ofirmev 1g for HA and pain  BPs normal to severe range today s/p multiple IV pushes, BP now 134/64. Continue to monitor closely  PEC labs pending  2. GI: Diet: Clears as tolerated, NPO for now and will start on BP meds when tolerating PO  3. Neuro: IV pain medications PRN, hold NSAIDs  4. : strict Is and Os, holley in place

## 2019-04-20 NOTE — PROGRESS NOTE ADULT - SUBJECTIVE AND OBJECTIVE BOX
Persistent severe hypertension noted, pt denies toxic symptoms, physical exam otherwise unremarkable, PEC labs WNL, UA pending  PLAN  - IV magnesium for seizure prophylaxis  - IV hydralazine 10mg IV  - CBC given PPH  - continue to monitor

## 2019-04-20 NOTE — PROVIDER CONTACT NOTE (CHANGE IN STATUS NOTIFICATION) - BACKGROUND
Pt. in recovery room status post , chronic hypertension, received 2 pushes of Labetalol, and 1 of Hydralazine earlier.

## 2019-04-21 LAB
ALBUMIN SERPL ELPH-MCNC: 2.2 G/DL — LOW (ref 3.3–5)
ALBUMIN SERPL ELPH-MCNC: 2.3 G/DL — LOW (ref 3.3–5)
ALP SERPL-CCNC: 108 U/L — SIGNIFICANT CHANGE UP (ref 40–120)
ALP SERPL-CCNC: 110 U/L — SIGNIFICANT CHANGE UP (ref 40–120)
ALT FLD-CCNC: 7 U/L — LOW (ref 10–45)
ALT FLD-CCNC: 7 U/L — LOW (ref 10–45)
ANION GAP SERPL CALC-SCNC: 12 MMOL/L — SIGNIFICANT CHANGE UP (ref 5–17)
ANION GAP SERPL CALC-SCNC: 12 MMOL/L — SIGNIFICANT CHANGE UP (ref 5–17)
AST SERPL-CCNC: 16 U/L — SIGNIFICANT CHANGE UP (ref 10–40)
AST SERPL-CCNC: 19 U/L — SIGNIFICANT CHANGE UP (ref 10–40)
BILIRUB SERPL-MCNC: <0.2 MG/DL — SIGNIFICANT CHANGE UP (ref 0.2–1.2)
BILIRUB SERPL-MCNC: <0.2 MG/DL — SIGNIFICANT CHANGE UP (ref 0.2–1.2)
BUN SERPL-MCNC: 6 MG/DL — LOW (ref 7–23)
BUN SERPL-MCNC: 6 MG/DL — LOW (ref 7–23)
CALCIUM SERPL-MCNC: 6.4 MG/DL — CRITICAL LOW (ref 8.4–10.5)
CALCIUM SERPL-MCNC: 6.4 MG/DL — CRITICAL LOW (ref 8.4–10.5)
CHLORIDE SERPL-SCNC: 96 MMOL/L — SIGNIFICANT CHANGE UP (ref 96–108)
CHLORIDE SERPL-SCNC: 96 MMOL/L — SIGNIFICANT CHANGE UP (ref 96–108)
CO2 SERPL-SCNC: 21 MMOL/L — LOW (ref 22–31)
CO2 SERPL-SCNC: 24 MMOL/L — SIGNIFICANT CHANGE UP (ref 22–31)
CREAT SERPL-MCNC: 0.7 MG/DL — SIGNIFICANT CHANGE UP (ref 0.5–1.3)
CREAT SERPL-MCNC: 0.81 MG/DL — SIGNIFICANT CHANGE UP (ref 0.5–1.3)
GLUCOSE BLDC GLUCOMTR-MCNC: 137 MG/DL — HIGH (ref 70–99)
GLUCOSE BLDC GLUCOMTR-MCNC: 161 MG/DL — HIGH (ref 70–99)
GLUCOSE BLDC GLUCOMTR-MCNC: 181 MG/DL — HIGH (ref 70–99)
GLUCOSE BLDC GLUCOMTR-MCNC: 183 MG/DL — HIGH (ref 70–99)
GLUCOSE SERPL-MCNC: 117 MG/DL — HIGH (ref 70–99)
GLUCOSE SERPL-MCNC: 201 MG/DL — HIGH (ref 70–99)
HCT VFR BLD CALC: 21.9 % — LOW (ref 34.5–45)
HCT VFR BLD CALC: 23.2 % — LOW (ref 34.5–45)
HGB BLD-MCNC: 7.2 G/DL — LOW (ref 11.5–15.5)
HGB BLD-MCNC: 7.5 G/DL — LOW (ref 11.5–15.5)
MAGNESIUM SERPL-MCNC: 6.4 MG/DL — HIGH (ref 1.6–2.6)
MAGNESIUM SERPL-MCNC: 7.4 MG/DL — CRITICAL HIGH (ref 1.6–2.6)
MCHC RBC-ENTMCNC: 26.8 PG — LOW (ref 27–34)
MCHC RBC-ENTMCNC: 27 PG — SIGNIFICANT CHANGE UP (ref 27–34)
MCHC RBC-ENTMCNC: 32.3 GM/DL — SIGNIFICANT CHANGE UP (ref 32–36)
MCHC RBC-ENTMCNC: 32.9 GM/DL — SIGNIFICANT CHANGE UP (ref 32–36)
MCV RBC AUTO: 82 FL — SIGNIFICANT CHANGE UP (ref 80–100)
MCV RBC AUTO: 82.9 FL — SIGNIFICANT CHANGE UP (ref 80–100)
NRBC # BLD: 0 /100 WBCS — SIGNIFICANT CHANGE UP (ref 0–0)
NRBC # BLD: 0 /100 WBCS — SIGNIFICANT CHANGE UP (ref 0–0)
PLATELET # BLD AUTO: 318 K/UL — SIGNIFICANT CHANGE UP (ref 150–400)
PLATELET # BLD AUTO: 368 K/UL — SIGNIFICANT CHANGE UP (ref 150–400)
POTASSIUM SERPL-MCNC: 3.2 MMOL/L — LOW (ref 3.5–5.3)
POTASSIUM SERPL-MCNC: 3.4 MMOL/L — LOW (ref 3.5–5.3)
POTASSIUM SERPL-SCNC: 3.2 MMOL/L — LOW (ref 3.5–5.3)
POTASSIUM SERPL-SCNC: 3.4 MMOL/L — LOW (ref 3.5–5.3)
PROT SERPL-MCNC: 5 G/DL — LOW (ref 6–8.3)
PROT SERPL-MCNC: 5.1 G/DL — LOW (ref 6–8.3)
RBC # BLD: 2.67 M/UL — LOW (ref 3.8–5.2)
RBC # BLD: 2.8 M/UL — LOW (ref 3.8–5.2)
RBC # FLD: 15.7 % — HIGH (ref 10.3–14.5)
RBC # FLD: 15.9 % — HIGH (ref 10.3–14.5)
SODIUM SERPL-SCNC: 129 MMOL/L — LOW (ref 135–145)
SODIUM SERPL-SCNC: 132 MMOL/L — LOW (ref 135–145)
URATE SERPL-MCNC: 5.4 MG/DL — SIGNIFICANT CHANGE UP (ref 2.5–7)
WBC # BLD: 20.48 K/UL — HIGH (ref 3.8–10.5)
WBC # BLD: 21.42 K/UL — HIGH (ref 3.8–10.5)
WBC # FLD AUTO: 20.48 K/UL — HIGH (ref 3.8–10.5)
WBC # FLD AUTO: 21.42 K/UL — HIGH (ref 3.8–10.5)

## 2019-04-21 RX ORDER — INSULIN LISPRO 100/ML
11 VIAL (ML) SUBCUTANEOUS
Qty: 0 | Refills: 0 | Status: DISCONTINUED | OUTPATIENT
Start: 2019-04-21 | End: 2019-04-24

## 2019-04-21 RX ORDER — INSULIN LISPRO 100/ML
14 VIAL (ML) SUBCUTANEOUS
Qty: 0 | Refills: 0 | Status: DISCONTINUED | OUTPATIENT
Start: 2019-04-21 | End: 2019-04-24

## 2019-04-21 RX ORDER — HUMAN INSULIN 100 [IU]/ML
11 INJECTION, SUSPENSION SUBCUTANEOUS AT BEDTIME
Qty: 0 | Refills: 0 | Status: DISCONTINUED | OUTPATIENT
Start: 2019-04-21 | End: 2019-04-24

## 2019-04-21 RX ORDER — HUMAN INSULIN 100 [IU]/ML
29 INJECTION, SUSPENSION SUBCUTANEOUS
Qty: 0 | Refills: 0 | Status: DISCONTINUED | OUTPATIENT
Start: 2019-04-21 | End: 2019-04-24

## 2019-04-21 RX ORDER — OXYCODONE AND ACETAMINOPHEN 5; 325 MG/1; MG/1
1 TABLET ORAL ONCE
Qty: 0 | Refills: 0 | Status: DISCONTINUED | OUTPATIENT
Start: 2019-04-21 | End: 2019-04-21

## 2019-04-21 RX ORDER — ONDANSETRON 8 MG/1
4 TABLET, FILM COATED ORAL ONCE
Qty: 0 | Refills: 0 | Status: COMPLETED | OUTPATIENT
Start: 2019-04-21 | End: 2019-04-21

## 2019-04-21 RX ORDER — CALCIUM CARBONATE 500(1250)
1 TABLET ORAL
Qty: 0 | Refills: 0 | Status: DISCONTINUED | OUTPATIENT
Start: 2019-04-21 | End: 2019-04-25

## 2019-04-21 RX ADMIN — OXYCODONE AND ACETAMINOPHEN 1 TABLET(S): 5; 325 TABLET ORAL at 17:13

## 2019-04-21 RX ADMIN — Medication 325 MILLIGRAM(S): at 11:59

## 2019-04-21 RX ADMIN — OXYCODONE AND ACETAMINOPHEN 1 TABLET(S): 5; 325 TABLET ORAL at 18:00

## 2019-04-21 RX ADMIN — Medication 100 MILLIGRAM(S): at 12:01

## 2019-04-21 RX ADMIN — ONDANSETRON 4 MILLIGRAM(S): 8 TABLET, FILM COATED ORAL at 10:14

## 2019-04-21 RX ADMIN — Medication 600 MILLIGRAM(S): at 15:55

## 2019-04-21 RX ADMIN — Medication 600 MILLIGRAM(S): at 09:44

## 2019-04-21 RX ADMIN — Medication 200 MILLIGRAM(S): at 15:22

## 2019-04-21 RX ADMIN — HEPARIN SODIUM 7500 UNIT(S): 5000 INJECTION INTRAVENOUS; SUBCUTANEOUS at 18:16

## 2019-04-21 RX ADMIN — Medication 2: at 07:10

## 2019-04-21 RX ADMIN — Medication 600 MILLIGRAM(S): at 04:35

## 2019-04-21 RX ADMIN — Medication 25 MILLIGRAM(S): at 05:13

## 2019-04-21 RX ADMIN — Medication 600 MILLIGRAM(S): at 20:57

## 2019-04-21 RX ADMIN — Medication 2: at 18:32

## 2019-04-21 RX ADMIN — ONDANSETRON 4 MILLIGRAM(S): 8 TABLET, FILM COATED ORAL at 17:43

## 2019-04-21 RX ADMIN — Medication 2: at 23:18

## 2019-04-21 RX ADMIN — HUMAN INSULIN 11 UNIT(S): 100 INJECTION, SUSPENSION SUBCUTANEOUS at 23:29

## 2019-04-21 RX ADMIN — HEPARIN SODIUM 7500 UNIT(S): 5000 INJECTION INTRAVENOUS; SUBCUTANEOUS at 09:43

## 2019-04-21 RX ADMIN — Medication 600 MILLIGRAM(S): at 03:50

## 2019-04-21 RX ADMIN — Medication 1 TABLET(S): at 12:00

## 2019-04-21 RX ADMIN — Medication 4: at 03:39

## 2019-04-21 RX ADMIN — Medication 600 MILLIGRAM(S): at 21:30

## 2019-04-21 RX ADMIN — OXYCODONE AND ACETAMINOPHEN 1 TABLET(S): 5; 325 TABLET ORAL at 15:51

## 2019-04-21 RX ADMIN — Medication 1 TABLET(S): at 11:59

## 2019-04-21 RX ADMIN — Medication 600 MILLIGRAM(S): at 10:30

## 2019-04-21 RX ADMIN — Medication 600 MILLIGRAM(S): at 15:22

## 2019-04-21 NOTE — PROGRESS NOTE ADULT - ASSESSMENT
39 yo F s/p C/S#3, R tubal ligation, and KATH ligation c/b PPH and superimposed preeclampsia on IV Magnesium.  -superimposed PEC: On IV Mg, labs just sent, patient w/ a multitude of complaints, some which may be related to mg toxicity. Mg due to come off at 11:30 am, will stop mg now and restart depending on her mg level which is pending. Hyponatremia w/ Na 129, LR switched to NS  -PPH: hgb 7.2 this morning, normal lochia now  -T2DM: ISS until tolerating PO, then start her insulin regimen. lispro 14 u prebk, 11 predinner, humalin 29 prebk and 11 predin.   -GI: Diabetic diet  -: JAIMIE Aquino  -DVT ppx: SQH

## 2019-04-21 NOTE — LACTATION INITIAL EVALUATION - NS LACT CON REASON FOR REQ
41 wk gestation baby boy, seen around 31 hrs of life. Baby has had 2 wet and 3 dirty diapers, and has been receiving formula supplement via bottle since birth, up to 30ml/ feed. Breast pump at bedside, set up by RN. The mother verbalized a desire to breastfeed but is unsure about exclusively breastfeeding, stating, “Its hard and a lot of work so Im katelyn want help”. I offered support and understanding. I explained the milk production feedback system, benefits of direct breastfeeding, implications of early intro to artificial nipples, as well as breastfeeding basics, guidelines, and normal  behavior. This is the mothers third child. Her now 8 and 9 year old children were premature and in NICU, she ended up pumping for 2 months for them, and wants to incorporate pumping with this baby too. She has nipple piercings and says they help to “pull out” the nipples, which are otherwise short. She understands to never have piercings in place during feeding or pumping, and understands to keep the nipple clean and rings sterilized. (cont. below).../multiparous mom

## 2019-04-21 NOTE — LACTATION INITIAL EVALUATION - PRO FEEDING PLAN INFANT OB
breast and formula feeding/Mother with large nipples and small amount of colostrum expressible, which is also expressed through piercing holes. Manual expression was taught with proper return demo and drops given to baby. She reports the baby has not yet been able to latch to breast. Placed the baby skin to skin with the mother and assisted with positioning in a laid-back hold. Latching strategies and the importance of acquiring a deep latch were taught. Baby sleepy at this time, but was able to latch deeply to the left breast. Suck disorganized and weak, but baby able to sustain suction for about 5 minutes before falling back asleep and sliding off breast. Pumping guidelines and expectations were reviewed. She will double pump 15-20min for any future missed or ineffective feeds, sized for 30mm flanges. Continued SSC and rooming-in were encouraged. To continue to monitor and f/u as needed. RN made aware of consult progress. All questions were answered, and mother verbalized understanding of info given.

## 2019-04-21 NOTE — PROGRESS NOTE ADULT - ASSESSMENT
A&P: 39y Female s/p complicated by preeclampsia with severe features.      1. Preeclampsia:   Continue IV Magnesium @2G/hr for 24 hrs post delivery.   Magnesium clinical checks and serum assay q6 hrs   No complaints currently.   BP controlled with labetalol 200 BID   2. GI: Diet: Clears as tolerated  3. Neuro: PO pain medications PRN, hold NSAIDs  4. : strict Is and Os, holley in place

## 2019-04-21 NOTE — PROGRESS NOTE ADULT - SUBJECTIVE AND OBJECTIVE BOX
Patient evaluated at bedside for clinical magnesium check.     She denies visual disturbances including scotoma, headache and right upper quadrant pain. Also denies nausea/vomiting/epigastric pain/shortness of breath. Pain well controlled.      T(C): 36.5 (04-21-19 @ 05:16), Max: 36.5 (04-21-19 @ 05:16)  HR: 80 (04-21-19 @ 05:16) (61 - 82)  BP: 100/61 (04-21-19 @ 05:16) (100/61 - 126/64)  RR: 18 (04-21-19 @ 05:16) (18 - 18)  SpO2: 99% (04-21-19 @ 05:16) (99% - 100%)    Gen: NAD  Pulm: CTAB  Abd: soft, nontender, no rebound or guarding, no epigastric tenderness, liver nonpalpable +BS, fundus palpated   : Cook in place  Ext: Reflexes +2                           7.5    21.42 )-----------( 318      ( 21 Apr 2019 01:58 )             23.2     04-21    129<L>  |  96  |  6<L>  ----------------------------<  201<H>  3.4<L>   |  21<L>  |  0.70    Ca    6.4<LL>      21 Apr 2019 01:58  Mg     6.4     04-21    TPro  5.0<L>  /  Alb  2.3<L>  /  TBili  <0.2  /  DBili  x   /  AST  16  /  ALT  7<L>  /  AlkPhos  108  04-21 04-20-19 @ 07:01  -  04-21-19 @ 07:00  --------------------------------------------------------  IN: 1650 mL / OUT: 3251 mL / NET: -1601 mL

## 2019-04-21 NOTE — PROGRESS NOTE ADULT - SUBJECTIVE AND OBJECTIVE BOX
Patient seen at bedside. Patient complains of heartburn, feeling hot, and visual disturbances. Denies headache. Had nausea overnight and vomited x1.   Has not passed flatus, ambulated, or voided.     Vital Signs Last 24 Hrs  T(C): 36.6 (21 Apr 2019 08:58), Max: 36.6 (21 Apr 2019 08:58)  T(F): 97.9 (21 Apr 2019 08:58), Max: 97.9 (21 Apr 2019 08:58)  HR: 87 (21 Apr 2019 08:58) (56 - 87)  BP: 110/70 (21 Apr 2019 08:58) (100/61 - 202/86)  BP(mean): --  RR: 19 (21 Apr 2019 08:58) (15 - 22)  SpO2: 100% (21 Apr 2019 08:58) (95% - 100%)    Gen Tearful and anxious, AOx3  Neuro: CNII-XII grossly intact  Chest: CTAB, RRR, no M/R/G  Abdomen: soft, nondistended, hypoactive BS, fundus firm  Prevena dressing  Extremities: Reflexes 2+, no calf tenderness, LUE slightly weaker than RUE, however full range of motion                          7.2    20.48 )-----------( 368      ( 21 Apr 2019 09:38 )             21.9   04-21    129<L>  |  96  |  6<L>  ----------------------------<  201<H>  3.4<L>   |  21<L>  |  0.70    Ca    6.4<LL>      21 Apr 2019 01:58  Mg     6.4     04-21    TPro  5.0<L>  /  Alb  2.3<L>  /  TBili  <0.2  /  DBili  x   /  AST  16  /  ALT  7<L>  /  AlkPhos  108  04-21

## 2019-04-22 LAB
GLUCOSE BLDC GLUCOMTR-MCNC: 101 MG/DL — HIGH (ref 70–99)
GLUCOSE BLDC GLUCOMTR-MCNC: 106 MG/DL — HIGH (ref 70–99)
GLUCOSE BLDC GLUCOMTR-MCNC: 111 MG/DL — HIGH (ref 70–99)
GLUCOSE BLDC GLUCOMTR-MCNC: 73 MG/DL — SIGNIFICANT CHANGE UP (ref 70–99)
GLUCOSE BLDC GLUCOMTR-MCNC: 81 MG/DL — SIGNIFICANT CHANGE UP (ref 70–99)
GLUCOSE BLDC GLUCOMTR-MCNC: 84 MG/DL — SIGNIFICANT CHANGE UP (ref 70–99)
GLUCOSE BLDC GLUCOMTR-MCNC: 99 MG/DL — SIGNIFICANT CHANGE UP (ref 70–99)

## 2019-04-22 RX ORDER — ONDANSETRON 8 MG/1
4 TABLET, FILM COATED ORAL EVERY 6 HOURS
Qty: 0 | Refills: 0 | Status: DISCONTINUED | OUTPATIENT
Start: 2019-04-22 | End: 2019-04-22

## 2019-04-22 RX ORDER — ONDANSETRON 8 MG/1
4 TABLET, FILM COATED ORAL EVERY 6 HOURS
Qty: 0 | Refills: 0 | Status: DISCONTINUED | OUTPATIENT
Start: 2019-04-22 | End: 2019-04-25

## 2019-04-22 RX ORDER — ONDANSETRON 8 MG/1
4 TABLET, FILM COATED ORAL ONCE
Qty: 0 | Refills: 0 | Status: COMPLETED | OUTPATIENT
Start: 2019-04-22 | End: 2019-04-22

## 2019-04-22 RX ADMIN — OXYCODONE AND ACETAMINOPHEN 1 TABLET(S): 5; 325 TABLET ORAL at 13:40

## 2019-04-22 RX ADMIN — Medication 200 MILLIGRAM(S): at 03:36

## 2019-04-22 RX ADMIN — OXYCODONE AND ACETAMINOPHEN 1 TABLET(S): 5; 325 TABLET ORAL at 11:34

## 2019-04-22 RX ADMIN — Medication 14 UNIT(S): at 10:10

## 2019-04-22 RX ADMIN — HUMAN INSULIN 29 UNIT(S): 100 INJECTION, SUSPENSION SUBCUTANEOUS at 10:01

## 2019-04-22 RX ADMIN — OXYCODONE AND ACETAMINOPHEN 1 TABLET(S): 5; 325 TABLET ORAL at 02:30

## 2019-04-22 RX ADMIN — ONDANSETRON 4 MILLIGRAM(S): 8 TABLET, FILM COATED ORAL at 09:47

## 2019-04-22 RX ADMIN — HEPARIN SODIUM 7500 UNIT(S): 5000 INJECTION INTRAVENOUS; SUBCUTANEOUS at 17:51

## 2019-04-22 RX ADMIN — OXYCODONE AND ACETAMINOPHEN 1 TABLET(S): 5; 325 TABLET ORAL at 12:30

## 2019-04-22 RX ADMIN — OXYCODONE AND ACETAMINOPHEN 1 TABLET(S): 5; 325 TABLET ORAL at 14:40

## 2019-04-22 RX ADMIN — HEPARIN SODIUM 7500 UNIT(S): 5000 INJECTION INTRAVENOUS; SUBCUTANEOUS at 02:01

## 2019-04-22 RX ADMIN — OXYCODONE AND ACETAMINOPHEN 1 TABLET(S): 5; 325 TABLET ORAL at 16:50

## 2019-04-22 RX ADMIN — OXYCODONE AND ACETAMINOPHEN 1 TABLET(S): 5; 325 TABLET ORAL at 01:59

## 2019-04-22 RX ADMIN — OXYCODONE AND ACETAMINOPHEN 1 TABLET(S): 5; 325 TABLET ORAL at 19:50

## 2019-04-22 RX ADMIN — Medication 600 MILLIGRAM(S): at 22:39

## 2019-04-22 RX ADMIN — ONDANSETRON 4 MILLIGRAM(S): 8 TABLET, FILM COATED ORAL at 09:18

## 2019-04-22 RX ADMIN — ONDANSETRON 4 MILLIGRAM(S): 8 TABLET, FILM COATED ORAL at 18:50

## 2019-04-22 RX ADMIN — Medication 200 MILLIGRAM(S): at 16:12

## 2019-04-22 RX ADMIN — OXYCODONE AND ACETAMINOPHEN 2 TABLET(S): 5; 325 TABLET ORAL at 05:10

## 2019-04-22 RX ADMIN — Medication 600 MILLIGRAM(S): at 23:00

## 2019-04-22 RX ADMIN — HEPARIN SODIUM 7500 UNIT(S): 5000 INJECTION INTRAVENOUS; SUBCUTANEOUS at 10:15

## 2019-04-22 RX ADMIN — Medication 11 UNIT(S): at 19:49

## 2019-04-22 RX ADMIN — OXYCODONE AND ACETAMINOPHEN 1 TABLET(S): 5; 325 TABLET ORAL at 18:50

## 2019-04-22 NOTE — PROGRESS NOTE ADULT - ASSESSMENT
A&P: 39y Female POD2 s/p complicated by preeclampsia with severe features.    -PEC: s/p IV Mg, BP well controlled, asymptomatic  -PPH: hgb stable, asymptomatic  -GI: reg ADA as tolerated, ISS until tolerating reg diet, home regimen ordered for when tolerating reg diet  -: voiding  -DVT ppx: SQH

## 2019-04-22 NOTE — PROVIDER CONTACT NOTE (OTHER) - SITUATION
MDs notified of pts status Pt hypoglycemic  Give OJ x2    BS 73  After OJ given  Pt not eating very much MDs notified of pts status Pt hypoglycemic  Given  OJ x2    BS 73  After OJ given  Pt not eating very much

## 2019-04-22 NOTE — PROVIDER CONTACT NOTE (OTHER) - RECOMMENDATIONS
Pt encouraged  to eat more   Appetite depressed  Explained importance of eating because of insulin  Pt states she will eat more

## 2019-04-22 NOTE — PROGRESS NOTE ADULT - SUBJECTIVE AND OBJECTIVE BOX
Patient seen at bedside. She reports occasional nausea/vomiting, per her usual since surgery prior to pregnancy. Tolerating clears and PO meds. No flatus. She is voiding, ambulating, and breast feeding. Denies HA, scotoma, RUQ pain, chest pain, dizziness, palpitations.     T(C): 36.5 (04-21-19 @ 05:16), Max: 36.5 (04-21-19 @ 05:16)  HR: 80 (04-21-19 @ 05:16) (61 - 82)  BP: 100/61 (04-21-19 @ 05:16) (100/61 - 126/64)  RR: 18 (04-21-19 @ 05:16) (18 - 18)  SpO2: 99% (04-21-19 @ 05:16) (99% - 100%)    Gen: NAD  Pulm: CTAB  Abd: soft, nontender, no rebound or guarding, no epigastric tenderness, liver nonpalpable +BS, fundus firm at umbilicus  Incision: Prevena w/ scant dark blood in tubing  : Cook in place  Ext: Reflexes +2                           7.5    21.42 )-----------( 318      ( 21 Apr 2019 01:58 )             23.2     04-21    129<L>  |  96  |  6<L>  ----------------------------<  201<H>  3.4<L>   |  21<L>  |  0.70    Ca    6.4<LL>      21 Apr 2019 01:58  Mg     6.4     04-21    TPro  5.0<L>  /  Alb  2.3<L>  /  TBili  <0.2  /  DBili  x   /  AST  16  /  ALT  7<L>  /  AlkPhos  108  04-21 04-20-19 @ 07:01  -  04-21-19 @ 07:00  --------------------------------------------------------  IN: 1650 mL / OUT: 3251 mL / NET: -1601 mL

## 2019-04-23 LAB
ALBUMIN SERPL ELPH-MCNC: 2.4 G/DL — LOW (ref 3.3–5)
ALP SERPL-CCNC: 102 U/L — SIGNIFICANT CHANGE UP (ref 40–120)
ALT FLD-CCNC: 13 U/L — SIGNIFICANT CHANGE UP (ref 10–45)
ANION GAP SERPL CALC-SCNC: 13 MMOL/L — SIGNIFICANT CHANGE UP (ref 5–17)
AST SERPL-CCNC: 32 U/L — SIGNIFICANT CHANGE UP (ref 10–40)
BASOPHILS # BLD AUTO: 0.01 K/UL — SIGNIFICANT CHANGE UP (ref 0–0.2)
BASOPHILS NFR BLD AUTO: 0.1 % — SIGNIFICANT CHANGE UP (ref 0–2)
BILIRUB SERPL-MCNC: 0.3 MG/DL — SIGNIFICANT CHANGE UP (ref 0.2–1.2)
BUN SERPL-MCNC: 5 MG/DL — LOW (ref 7–23)
CALCIUM SERPL-MCNC: 7.5 MG/DL — LOW (ref 8.4–10.5)
CHLORIDE SERPL-SCNC: 98 MMOL/L — SIGNIFICANT CHANGE UP (ref 96–108)
CO2 SERPL-SCNC: 25 MMOL/L — SIGNIFICANT CHANGE UP (ref 22–31)
CREAT SERPL-MCNC: 0.71 MG/DL — SIGNIFICANT CHANGE UP (ref 0.5–1.3)
EOSINOPHIL # BLD AUTO: 0.13 K/UL — SIGNIFICANT CHANGE UP (ref 0–0.5)
EOSINOPHIL NFR BLD AUTO: 1 % — SIGNIFICANT CHANGE UP (ref 0–6)
GLUCOSE BLDC GLUCOMTR-MCNC: 103 MG/DL — HIGH (ref 70–99)
GLUCOSE BLDC GLUCOMTR-MCNC: 118 MG/DL — HIGH (ref 70–99)
GLUCOSE BLDC GLUCOMTR-MCNC: 138 MG/DL — HIGH (ref 70–99)
GLUCOSE BLDC GLUCOMTR-MCNC: 141 MG/DL — HIGH (ref 70–99)
GLUCOSE SERPL-MCNC: 97 MG/DL — SIGNIFICANT CHANGE UP (ref 70–99)
HBA1C BLD-MCNC: 6 % — HIGH (ref 4–5.6)
HCT VFR BLD CALC: 18.2 % — CRITICAL LOW (ref 34.5–45)
HGB BLD-MCNC: 5.6 G/DL — CRITICAL LOW (ref 11.5–15.5)
IMM GRANULOCYTES NFR BLD AUTO: 0.9 % — SIGNIFICANT CHANGE UP (ref 0–1.5)
LYMPHOCYTES # BLD AUTO: 28.3 % — SIGNIFICANT CHANGE UP (ref 13–44)
LYMPHOCYTES # BLD AUTO: 3.62 K/UL — HIGH (ref 1–3.3)
MAGNESIUM SERPL-MCNC: 2.7 MG/DL — HIGH (ref 1.6–2.6)
MCHC RBC-ENTMCNC: 26.4 PG — LOW (ref 27–34)
MCHC RBC-ENTMCNC: 30.8 GM/DL — LOW (ref 32–36)
MCV RBC AUTO: 85.8 FL — SIGNIFICANT CHANGE UP (ref 80–100)
MONOCYTES # BLD AUTO: 1.03 K/UL — HIGH (ref 0–0.9)
MONOCYTES NFR BLD AUTO: 8 % — SIGNIFICANT CHANGE UP (ref 2–14)
NEUTROPHILS # BLD AUTO: 7.89 K/UL — HIGH (ref 1.8–7.4)
NEUTROPHILS NFR BLD AUTO: 61.7 % — SIGNIFICANT CHANGE UP (ref 43–77)
NRBC # BLD: 0 /100 WBCS — SIGNIFICANT CHANGE UP (ref 0–0)
PHOSPHATE SERPL-MCNC: 2.3 MG/DL — LOW (ref 2.5–4.5)
PLATELET # BLD AUTO: 373 K/UL — SIGNIFICANT CHANGE UP (ref 150–400)
POTASSIUM SERPL-MCNC: 3.5 MMOL/L — SIGNIFICANT CHANGE UP (ref 3.5–5.3)
POTASSIUM SERPL-SCNC: 3.5 MMOL/L — SIGNIFICANT CHANGE UP (ref 3.5–5.3)
PROT SERPL-MCNC: 5.9 G/DL — LOW (ref 6–8.3)
RBC # BLD: 2.12 M/UL — LOW (ref 3.8–5.2)
RBC # FLD: 16 % — HIGH (ref 10.3–14.5)
SODIUM SERPL-SCNC: 136 MMOL/L — SIGNIFICANT CHANGE UP (ref 135–145)
WBC # BLD: 12.8 K/UL — HIGH (ref 3.8–10.5)
WBC # FLD AUTO: 12.8 K/UL — HIGH (ref 3.8–10.5)

## 2019-04-23 PROCEDURE — 86077 PHYS BLOOD BANK SERV XMATCH: CPT

## 2019-04-23 PROCEDURE — 99231 SBSQ HOSP IP/OBS SF/LOW 25: CPT

## 2019-04-23 PROCEDURE — 74019 RADEX ABDOMEN 2 VIEWS: CPT | Mod: 26

## 2019-04-23 RX ORDER — FAMOTIDINE 10 MG/ML
20 INJECTION INTRAVENOUS
Qty: 0 | Refills: 0 | Status: DISCONTINUED | OUTPATIENT
Start: 2019-04-23 | End: 2019-04-25

## 2019-04-23 RX ORDER — POLYETHYLENE GLYCOL 3350 17 G/17G
17 POWDER, FOR SOLUTION ORAL DAILY
Qty: 0 | Refills: 0 | Status: DISCONTINUED | OUTPATIENT
Start: 2019-04-23 | End: 2019-04-24

## 2019-04-23 RX ORDER — ACETAMINOPHEN 500 MG
650 TABLET ORAL EVERY 6 HOURS
Qty: 0 | Refills: 0 | Status: DISCONTINUED | OUTPATIENT
Start: 2019-04-23 | End: 2019-04-25

## 2019-04-23 RX ORDER — IPRATROPIUM/ALBUTEROL SULFATE 18-103MCG
3 AEROSOL WITH ADAPTER (GRAM) INHALATION EVERY 6 HOURS
Qty: 0 | Refills: 0 | Status: DISCONTINUED | OUTPATIENT
Start: 2019-04-23 | End: 2019-04-25

## 2019-04-23 RX ORDER — ALBUTEROL 90 UG/1
1 AEROSOL, METERED ORAL EVERY 4 HOURS
Qty: 0 | Refills: 0 | Status: DISCONTINUED | OUTPATIENT
Start: 2019-04-23 | End: 2019-04-25

## 2019-04-23 RX ORDER — ALBUTEROL 90 UG/1
1 AEROSOL, METERED ORAL EVERY 4 HOURS
Qty: 0 | Refills: 0 | Status: COMPLETED | OUTPATIENT
Start: 2019-04-23 | End: 2020-03-21

## 2019-04-23 RX ORDER — TIOTROPIUM BROMIDE 18 UG/1
1 CAPSULE ORAL; RESPIRATORY (INHALATION) DAILY
Qty: 0 | Refills: 0 | Status: DISCONTINUED | OUTPATIENT
Start: 2019-04-23 | End: 2019-04-25

## 2019-04-23 RX ADMIN — Medication 600 MILLIGRAM(S): at 22:56

## 2019-04-23 RX ADMIN — HEPARIN SODIUM 7500 UNIT(S): 5000 INJECTION INTRAVENOUS; SUBCUTANEOUS at 09:52

## 2019-04-23 RX ADMIN — Medication 600 MILLIGRAM(S): at 17:49

## 2019-04-23 RX ADMIN — OXYCODONE AND ACETAMINOPHEN 1 TABLET(S): 5; 325 TABLET ORAL at 02:27

## 2019-04-23 RX ADMIN — HEPARIN SODIUM 7500 UNIT(S): 5000 INJECTION INTRAVENOUS; SUBCUTANEOUS at 00:50

## 2019-04-23 RX ADMIN — Medication 100 MILLIGRAM(S): at 23:45

## 2019-04-23 RX ADMIN — Medication 5 MILLIGRAM(S): at 09:52

## 2019-04-23 RX ADMIN — SIMETHICONE 80 MILLIGRAM(S): 80 TABLET, CHEWABLE ORAL at 23:45

## 2019-04-23 RX ADMIN — Medication 200 MILLIGRAM(S): at 15:52

## 2019-04-23 RX ADMIN — Medication 200 MILLIGRAM(S): at 03:38

## 2019-04-23 RX ADMIN — Medication 600 MILLIGRAM(S): at 09:53

## 2019-04-23 RX ADMIN — Medication 600 MILLIGRAM(S): at 03:38

## 2019-04-23 RX ADMIN — Medication 600 MILLIGRAM(S): at 18:47

## 2019-04-23 RX ADMIN — OXYCODONE AND ACETAMINOPHEN 1 TABLET(S): 5; 325 TABLET ORAL at 16:45

## 2019-04-23 RX ADMIN — SIMETHICONE 80 MILLIGRAM(S): 80 TABLET, CHEWABLE ORAL at 07:42

## 2019-04-23 RX ADMIN — Medication 1 TABLET(S): at 12:48

## 2019-04-23 RX ADMIN — ONDANSETRON 4 MILLIGRAM(S): 8 TABLET, FILM COATED ORAL at 13:01

## 2019-04-23 RX ADMIN — Medication 600 MILLIGRAM(S): at 10:45

## 2019-04-23 RX ADMIN — Medication 600 MILLIGRAM(S): at 23:41

## 2019-04-23 RX ADMIN — Medication 3 MILLILITER(S): at 11:20

## 2019-04-23 RX ADMIN — OXYCODONE AND ACETAMINOPHEN 1 TABLET(S): 5; 325 TABLET ORAL at 07:43

## 2019-04-23 RX ADMIN — FAMOTIDINE 20 MILLIGRAM(S): 10 INJECTION INTRAVENOUS at 12:49

## 2019-04-23 RX ADMIN — Medication 325 MILLIGRAM(S): at 12:48

## 2019-04-23 RX ADMIN — OXYCODONE AND ACETAMINOPHEN 1 TABLET(S): 5; 325 TABLET ORAL at 08:37

## 2019-04-23 RX ADMIN — OXYCODONE AND ACETAMINOPHEN 1 TABLET(S): 5; 325 TABLET ORAL at 15:52

## 2019-04-23 RX ADMIN — Medication 600 MILLIGRAM(S): at 04:08

## 2019-04-23 RX ADMIN — OXYCODONE AND ACETAMINOPHEN 1 TABLET(S): 5; 325 TABLET ORAL at 01:57

## 2019-04-23 RX ADMIN — HUMAN INSULIN 11 UNIT(S): 100 INJECTION, SUSPENSION SUBCUTANEOUS at 23:50

## 2019-04-23 RX ADMIN — OXYCODONE AND ACETAMINOPHEN 1 TABLET(S): 5; 325 TABLET ORAL at 12:15

## 2019-04-23 RX ADMIN — OXYCODONE AND ACETAMINOPHEN 1 TABLET(S): 5; 325 TABLET ORAL at 23:46

## 2019-04-23 RX ADMIN — Medication 100 MILLIGRAM(S): at 12:49

## 2019-04-23 RX ADMIN — OXYCODONE AND ACETAMINOPHEN 1 TABLET(S): 5; 325 TABLET ORAL at 11:20

## 2019-04-23 RX ADMIN — ONDANSETRON 4 MILLIGRAM(S): 8 TABLET, FILM COATED ORAL at 19:02

## 2019-04-23 NOTE — CONSULT NOTE ADULT - ATTENDING COMMENTS
Patient with history of Diabetes mellitus managed with Metformin as outpatient. During pregnancy she has been treated with NPH Insulin 29 in AM, 11 in PM and premealLOispro 14 units-11 units. Currently she is seen after delivery of a 9.5 lb infant.Her two other children were born very premature.Her Hgb dropped to 6.8 gm% after delivery, so she has received blood transfusion earlier today.       Glucoses of 103 and 118 noted today.   Will not give standing Insulin just coverage Insulin. Will follow.

## 2019-04-23 NOTE — PROGRESS NOTE ADULT - SUBJECTIVE AND OBJECTIVE BOX
Evaluated patient at bedside for Hgb of 5.6. Patient sitting in bed, reports feeling well denies lightheadedness, dizziness.     Continues to report nausea after eating, patient has been seen by Endocrine and GI for their recommendations. D/w patient benefits and risks for blood transfusion and patient agrees    Vital Signs Last 24 Hrs  T(C): 37.4 (23 Apr 2019 14:00), Max: 37.4 (23 Apr 2019 14:00)  T(F): 99.3 (23 Apr 2019 14:00), Max: 99.3 (23 Apr 2019 14:00)  HR: 84 (23 Apr 2019 14:00) (84 - 100)  BP: 120/71 (23 Apr 2019 14:00) (120/71 - 154/78)  BP(mean): --  RR: 16 (23 Apr 2019 14:00) (16 - 22)  SpO2: 100% (23 Apr 2019 14:00) (96% - 100%)    Gen: NAD  CV: RRR  Pulm: CTABL  Abd: non tender, no distended, soft, +BS  Ext: non tender     Plan for 2 units pRBCs, type and screen sent  Dr. Rivera aware

## 2019-04-23 NOTE — CONSULT NOTE ADULT - ASSESSMENT
Incomplete Note  39F PMH of DM, HTN, asthma, multiple LEEP surgeries, hernia s/p mesh placement requiring 3 additional surgeries, R tubal ligation, and KATH ligation c/b PPH and superimposed preeclampsia.  GI was consulted for post-op ileus.    #Ileus  -DDx: Mechanical: adhesions, volvulus.  Acute paralytic: abdominal surgery, myxedema.  -Encourage ambulation  -Recommend taper of percocet and switch to NSAIDs or tylenol.  Patient is in agreement.  -Recommend Miralax PRN 39F PMH of DM, HTN, asthma, multiple LEEP surgeries, hernia s/p mesh placement requiring 3 additional surgeries, R tubal ligation, and KATH ligation c/b PPH and superimposed preeclampsia.  GI was consulted for constipation    #Constipation- likely post op complication, 2/2 opioids  -abdomen is soft  -recommend abd xray  -Encourage ambulation  -Recommend taper of opioids Patient is in agreement.  -Recommend Miralax PRN, zofran PRN    #Acute anemia  -pt has no signs of active GIB  -given recent surgery recommend CT abdomen/pelvis   -possible post op blood loss vs hematoma vs uterine bleed 39F PMH of DM, HTN, asthma, multiple LEEP surgeries, hernia s/p mesh placement requiring 3 additional surgeries, R tubal ligation, and KATH ligation c/b PPH and superimposed preeclampsia.  GI was consulted for constipation    #Constipation- likely post op complication, 2/2 opioids  -abdomen is soft  -recommend abd xray  -Encourage ambulation  -Recommend taper of opioids Patient is in agreement.  -Recommend Miralax PRN, zofran PRN    #Acute anemia  -pt has no signs of active GIB  -given recent surgery recommend CT abdomen/pelvis to further evaluate  -possible post op blood loss vs hematoma  -notify GI if signs of GI Blood loss 39F PMH of DM, HTN, asthma, multiple LEEP surgeries, hernia s/p mesh placement requiring 3 additional surgeries, R tubal ligation, and KATH ligation c/b PPH and superimposed preeclampsia.  GI was consulted for constipation    #Constipation- likely post op ileus, exacerbated by narcotics and bed-rest  -abdomen is soft  -recommend abd xray  -Encourage ambulation  -Recommend taper of opioids Patient is in agreement.  -Recommend bowel regimen including miralax 17 gm BID  - ensure electrolytes are WNL    #Acute anemia, possible post op blood loss  -pt has no signs of active GIB  -given recent surgery, consider CT abdomen/pelvis to further evaluate. Defer to primary team  -notify GI if signs of GI Blood loss

## 2019-04-23 NOTE — CONSULT NOTE ADULT - SUBJECTIVE AND OBJECTIVE BOX
39yoF with a PMH of DM, HTN, asthma, CIN3 s/p LEEP , CIN2 s/p LEEP , herna c/b possible incarcerated bowel  s/p mesh placement requiring 3 additional surgeries now s/p C/S#3, R tubal ligation, and KATH ligation c/b PPH and superimposed preeclampsia. DM was diagnosed in , metformin started in  currently on 1000mg PO BID, was stopped  by PCP?      Age at Dx:  How dx:  Hx and duration of insulin:  Current Therapy:  Hx of hypoglycemia  Hx of DKA/HHS?    Home FSG:  Fasting  Lunch  Dinner  Bed    Hx of other regimens  Complications:  Outpatient Endo:    PMH & Surgical Hx:PREGNAANCY  LABOR EVAL  No pertinent family history in first degree relatives  Other pregnancy-related conditions, antepartum  Weeks of gestation of pregnancy not specified  Handoff  Abdominal hernia  Hernia  Obesity  Diabetes  Hyperlipidemia  Essential hypertension  H/O ventral hernia repair  H/O:   History of D&C  H/O LEEP      FH:  DM:  Thyroid:  Autoimmune:  Other:    SH:  Smoking  Etoh:  Recreational Drugs:  Social Life:    Current Meds:  acetaminophen   Tablet .. 650 milliGRAM(s) Oral every 6 hours PRN  ALBUTerol    90 MICROgram(s) HFA Inhaler 1 Puff(s) Inhalation every 4 hours PRN  ALBUTerol/ipratropium for Nebulization 3 milliLiter(s) Nebulizer every 6 hours PRN  aluminum hydroxide/magnesium hydroxide/simethicone Suspension 30 milliLiter(s) Oral every 4 hours PRN  bisacodyl 5 milliGRAM(s) Oral every 12 hours PRN  calcium carbonate    500 mG (Tums) Chewable 1 Tablet(s) Chew four times a day PRN  dextrose 40% Gel 15 Gram(s) Oral once PRN  dextrose 5%. 1000 milliLiter(s) IV Continuous <Continuous>  dextrose 50% Injectable 12.5 Gram(s) IV Push once  dextrose 50% Injectable 25 Gram(s) IV Push once  dextrose 50% Injectable 25 Gram(s) IV Push once  diphenhydrAMINE 25 milliGRAM(s) Oral every 6 hours PRN  diphtheria/tetanus/pertussis (acellular) Vaccine (ADAcel) 0.5 milliLiter(s) IntraMuscular once  docusate sodium 100 milliGRAM(s) Oral two times a day PRN  famotidine    Tablet 20 milliGRAM(s) Oral two times a day  ferrous    sulfate 325 milliGRAM(s) Oral daily  glucagon  Injectable 1 milliGRAM(s) IntraMuscular once PRN  glycerin Suppository - Adult 1 Suppository(s) Rectal at bedtime PRN  heparin  Injectable 7500 Unit(s) SubCutaneous every 8 hours  ibuprofen  Tablet. 600 milliGRAM(s) Oral every 6 hours  insulin lispro (HumaLOG) corrective regimen sliding scale   SubCutaneous Before meals and at bedtime  insulin lispro Injectable (HumaLOG) 14 Unit(s) SubCutaneous before breakfast  insulin lispro Injectable (HumaLOG) 11 Unit(s) SubCutaneous before dinner  insulin NPH human recombinant 29 Unit(s) SubCutaneous before breakfast  insulin NPH human recombinant 11 Unit(s) SubCutaneous at bedtime  labetalol 200 milliGRAM(s) Oral every 12 hours  lactated ringers. 1000 milliLiter(s) IV Continuous <Continuous>  lanolin Ointment 1 Application(s) Topical every 3 hours PRN  ondansetron    Tablet 4 milliGRAM(s) Oral every 6 hours PRN  oxyCODONE    5 mG/acetaminophen 325 mG 1 Tablet(s) Oral every 3 hours PRN  oxyCODONE    5 mG/acetaminophen 325 mG 2 Tablet(s) Oral every 6 hours PRN  oxytocin Infusion 333.333 milliUNIT(s)/Min IV Continuous <Continuous>  oxytocin Infusion 41.667 milliUNIT(s)/Min IV Continuous <Continuous>  oxytocin Infusion 41.667 milliUNIT(s)/Min IV Continuous <Continuous>  polyethylene glycol 3350 17 Gram(s) Oral daily  prenatal multivitamin 1 Tablet(s) Oral daily  saline laxative (FLEET) Rectal Enema 1 Enema Rectal once PRN  simethicone 80 milliGRAM(s) Chew every 4 hours PRN  tiotropium 18 MICROgram(s) Capsule 1 Capsule(s) Inhalation daily PRN      Allergies:  amoxicillin (Unknown)  iodine containing compounds (Hives; Anaphylaxis)  penicillin (Hives)  shellfish. (Hives; Anaphylaxis)      ROS:  Denies the following except as indicated.    General: weight loss/weight gain, decreased appetite, fatigue  Eyes: Blurry vision, double vision, visual changes  ENT: Throat pain, changes in voice,   CV: palpitations, SOB, CP, cough  GI: NVD, difficulty swallowing, abdominal pain  : polyuria, dysuria  Endo: abnormal menses, temperature intolerance, decreased libido  MSK: weakness, joint pain  Skin: rash, dryness, diaphoresis  Heme: Easy bruising,bleeding  Neuro: HA, dizziness, lightheadedness, numbness tingling  Psych: Anxiety, Depression    Vital Signs Last 24 Hrs  T(C): 36.7 (2019 06:00), Max: 37.2 (2019 18:05)  T(F): 98 (2019 06:00), Max: 99 (2019 18:05)  HR: 93 (2019 06:00) (80 - 104)  BP: 126/76 (2019 06:00) (126/70 - 154/80)  BP(mean): --  RR: 18 (2019 06:00) (18 - 22)  SpO2: 99% (2019 06:00) (96% - 100%)  Height (cm): 170.1 ( @ 05:19)  Weight (kg): 103.4 ( @ 05:19)  BMI (kg/m2): 35.7 ( @ 05:19)      Constitutional: wn/wd in NAD.   HEENT: NCAT, MMM, OP clear, EOMI, , no proptosis or lid retraction  Neck: no thyromegaly or palpable thyroid nodules   Respiratory: lungs CTAB.  Cardiovascular: regular rhythm, normal S1 and S2, no audible murmurs, no peripheral edema  GI: soft, NT/ND, no masses/HSM appreciated.  Neurology: no tremors, DTR 2+  Skin: no visible rashes/lesions  Psychiatric: AAO x 3, normal affect/mood.  Ext: radial pulses intact, DP pulses intact, extremities warm, no cyanosis, clubbing or edema.       LABS:                    RADIOLOGY & ADDITIONAL STUDIES:  CAPILLARY BLOOD GLUCOSE      POCT Blood Glucose.: 103 mg/dL (2019 07:03)  POCT Blood Glucose.: 81 mg/dL (2019 22:28)  POCT Blood Glucose.: 84 mg/dL (2019 19:53)  POCT Blood Glucose.: 99 mg/dL (2019 18:00)  POCT Blood Glucose.: 101 mg/dL (2019 14:59)  POCT Blood Glucose.: 73 mg/dL (2019 11:57)        A/P:39y Female    1.  DM  Please continue lantus       units at night / morning.  Please continue lispro      units before each meal.  Please continue lispro moderate / low dose sliding scale four times daily with meals and at bedtime    Pt's fingerstick glucose goal is     Will continue to monitor     For discharge, pt can continue    Pt can follow up at discharge with [facility] by calling  to make an appointment.   Will discuss case with     and update primary team 39yoF a PMH of DM, HTN, asthma, CIN3 s/p LEEP , CIN2 s/p LEEP , herna c/b possible incarcerated bowel 2017 s/p mesh placement requiring 3 additional surgeries, now POD3 s/p C/S#3, R tubal ligation, and KATH ligation c/b postpartum hemorrhage. DM was diagnosed in , metformin started in  1000mg PO BID. Pt states she was losing weight rapidly roughly 60lb, during the workup she was found to be diabetic. Currently gets her Rx from a pediatrician. Does not have a podiatrist or ophthalmologist. Denies vision problems or peripheral neuropathy symptoms. Insulin was started 2mo ago by the pt's OBGYN. Lispro 14U before breakfast, 11U before dinner. NPH 29U before breakfast, 11U before dinner. That regimen was continued on admission.  , additional 4U lispro given. Since admission pt states she hasn't been able to eat much other than some juice/broth and maybe one bite of food. Due to a hernia repair that resulted in multiple revisions the pt reports a long history of nausea and vomiting.      Age at Dx: 25  How dx: wt loss  Hx and duration of insulin: 2mo  Current Therapy: stated above  Hx of hypoglycemia:   Hx of DKA/HHS?    Home FS/300's in morning before breakfast    Hx of other regimens  Complications: diarrhea early in metformin therapy that resolved  Outpatient Endo: none    PMH & Surgical Hx:PREGNAANCY  LABOR EVAL  No pertinent family history in first degree relatives  Other pregnancy-related conditions, antepartum  Weeks of gestation of pregnancy not specified  Handoff  Abdominal hernia  Hernia  Obesity  Diabetes  Hyperlipidemia  Essential hypertension  H/O ventral hernia repair  H/O:   History of D&C  H/O LEEP      FH:  DM: both parents  Thyroid: mom hypothryoid  Autoimmune: none  Other: none    SH:  Smokinppd for 20yrs, quit smoking  Etoh: socially  Recreational Drugs: none  Social Life: lives with /children in [place]    Current Meds:  acetaminophen   Tablet .. 650 milliGRAM(s) Oral every 6 hours PRN  ALBUTerol    90 MICROgram(s) HFA Inhaler 1 Puff(s) Inhalation every 4 hours PRN  ALBUTerol/ipratropium for Nebulization 3 milliLiter(s) Nebulizer every 6 hours PRN  aluminum hydroxide/magnesium hydroxide/simethicone Suspension 30 milliLiter(s) Oral every 4 hours PRN  bisacodyl 5 milliGRAM(s) Oral every 12 hours PRN  calcium carbonate    500 mG (Tums) Chewable 1 Tablet(s) Chew four times a day PRN  dextrose 40% Gel 15 Gram(s) Oral once PRN  dextrose 5%. 1000 milliLiter(s) IV Continuous <Continuous>  dextrose 50% Injectable 12.5 Gram(s) IV Push once  dextrose 50% Injectable 25 Gram(s) IV Push once  dextrose 50% Injectable 25 Gram(s) IV Push once  diphenhydrAMINE 25 milliGRAM(s) Oral every 6 hours PRN  diphtheria/tetanus/pertussis (acellular) Vaccine (ADAcel) 0.5 milliLiter(s) IntraMuscular once  docusate sodium 100 milliGRAM(s) Oral two times a day PRN  famotidine    Tablet 20 milliGRAM(s) Oral two times a day  ferrous    sulfate 325 milliGRAM(s) Oral daily  glucagon  Injectable 1 milliGRAM(s) IntraMuscular once PRN  glycerin Suppository - Adult 1 Suppository(s) Rectal at bedtime PRN  heparin  Injectable 7500 Unit(s) SubCutaneous every 8 hours  ibuprofen  Tablet. 600 milliGRAM(s) Oral every 6 hours  insulin lispro (HumaLOG) corrective regimen sliding scale   SubCutaneous Before meals and at bedtime  insulin lispro Injectable (HumaLOG) 14 Unit(s) SubCutaneous before breakfast  insulin lispro Injectable (HumaLOG) 11 Unit(s) SubCutaneous before dinner  insulin NPH human recombinant 29 Unit(s) SubCutaneous before breakfast  insulin NPH human recombinant 11 Unit(s) SubCutaneous at bedtime  labetalol 200 milliGRAM(s) Oral every 12 hours  lactated ringers. 1000 milliLiter(s) IV Continuous <Continuous>  lanolin Ointment 1 Application(s) Topical every 3 hours PRN  ondansetron    Tablet 4 milliGRAM(s) Oral every 6 hours PRN  oxyCODONE    5 mG/acetaminophen 325 mG 1 Tablet(s) Oral every 3 hours PRN  oxyCODONE    5 mG/acetaminophen 325 mG 2 Tablet(s) Oral every 6 hours PRN  oxytocin Infusion 333.333 milliUNIT(s)/Min IV Continuous <Continuous>  oxytocin Infusion 41.667 milliUNIT(s)/Min IV Continuous <Continuous>  oxytocin Infusion 41.667 milliUNIT(s)/Min IV Continuous <Continuous>  polyethylene glycol 3350 17 Gram(s) Oral daily  prenatal multivitamin 1 Tablet(s) Oral daily  saline laxative (FLEET) Rectal Enema 1 Enema Rectal once PRN  simethicone 80 milliGRAM(s) Chew every 4 hours PRN  tiotropium 18 MICROgram(s) Capsule 1 Capsule(s) Inhalation daily PRN      Allergies:  amoxicillin (Unknown)  iodine containing compounds (Hives; Anaphylaxis)  penicillin (Hives)  shellfish. (Hives; Anaphylaxis)      ROS:  Denies the following except as indicated.    General: weight loss/weight gain, decreased appetite, fatigue  Eyes: Blurry vision, double vision, visual changes  ENT: Throat pain, changes in voice,   CV: palpitations, SOB, CP, cough  GI: +nausea/vomiting and abd pain, denies difficulty swallowing  : polyuria, dysuria  Endo: abnormal menses, temperature intolerance, decreased libido  MSK: weakness, joint pain  Skin: rash, dryness, diaphoresis  Heme: Easy bruising,bleeding  Neuro: HA, dizziness, lightheadedness, numbness tingling  Psych: Anxiety, Depression    Vital Signs Last 24 Hrs  T(C): 36.7 (2019 06:00), Max: 37.2 (2019 18:05)  T(F): 98 (2019 06:00), Max: 99 (2019 18:05)  HR: 93 (2019 06:00) (80 - 104)  BP: 126/76 (2019 06:00) (126/70 - 154/80)  BP(mean): --  RR: 18 (2019 06:00) (18 - 22)  SpO2: 99% (2019 06:00) (96% - 100%)  Height (cm): 170.1 ( @ 05:19)  Weight (kg): 103.4 ( @ 05:19)  BMI (kg/m2): 35.7 (- @ 05:19)      Constitutional: obese female  HEENT: NCAT, MMM, OP clear, EOMI, , no proptosis or lid retraction  Neck: no thyromegaly or palpable thyroid nodules   Respiratory: lungs CTAB.  Cardiovascular: regular rhythm, normal S1 and S2, no audible murmurs, no peripheral edema  GI: soft, NT/ND, no masses/HSM appreciated.  Neurology: no tremors, DTR 2+  Skin: no visible rashes/lesions  Psychiatric: AAO x 3, normal affect/mood.  Ext: radial pulses intact, DP pulses intact, extremities warm, no cyanosis, clubbing or edema.       LABS:                    RADIOLOGY & ADDITIONAL STUDIES:  CAPILLARY BLOOD GLUCOSE      POCT Blood Glucose.: 103 mg/dL (2019 07:03)  POCT Blood Glucose.: 81 mg/dL (2019 22:28)  POCT Blood Glucose.: 84 mg/dL (2019 19:53)  POCT Blood Glucose.: 99 mg/dL (2019 18:00)  POCT Blood Glucose.: 101 mg/dL (2019 14:59)  POCT Blood Glucose.: 73 mg/dL (2019 11:57)        A/P:39y Female    1.  DM  Please continue lantus       units at night / morning.  Please continue lispro      units before each meal.  Please continue lispro moderate / low dose sliding scale four times daily with meals and at bedtime    Pt's fingerstick glucose goal is     Will continue to monitor     For discharge, pt can continue    Pt can follow up at discharge with [facility] by calling  to make an appointment.   Will discuss case with     and update primary team

## 2019-04-23 NOTE — PROGRESS NOTE ADULT - SUBJECTIVE AND OBJECTIVE BOX
Patient evaluated at bedside.   She reports pain is well controlled.  She denies headache, dizziness, chest pain, palpitations, shortness of breathe, nausea, vomiting or heavy vaginal bleeding.  She has been ambulating without assistance, voiding spontaneously, passing gas, tolerating regular diet and is breastfeeding.    Physical Exam:  Vital Signs Last 24 Hrs  T(C): 36.7 (23 Apr 2019 06:00), Max: 37.4 (22 Apr 2019 10:03)  T(F): 98 (23 Apr 2019 06:00), Max: 99.3 (22 Apr 2019 10:03)  HR: 93 (23 Apr 2019 06:00) (80 - 104)  BP: 126/76 (23 Apr 2019 06:00) (126/70 - 154/80)  BP(mean): --  RR: 18 (23 Apr 2019 06:00) (18 - 22)  SpO2: 99% (23 Apr 2019 06:00) (96% - 100%)      GA: NAD, A+0 x 3  CV: RRR  Pulm: Normal work of breathing  Breasts: soft, nontender, no palpable masses  Abd: + BS, soft, nontender, nondistended, no rebound or guarding, uterus firm at midline,  fb below umbilicus  Incision: well approximated, no erythema or discharge  : lochia WNL  Extremities: no swelling or calf tenderness                            7.2    20.48 )-----------( 368      ( 21 Apr 2019 09:38 )             21.9     04-21    132<L>  |  96  |  6<L>  ----------------------------<  117<H>  3.2<L>   |  24  |  0.81    Ca    6.4<LL>      21 Apr 2019 09:38  Mg     7.4     04-21    TPro  5.1<L>  /  Alb  2.2<L>  /  TBili  <0.2  /  DBili  x   /  AST  19  /  ALT  7<L>  /  AlkPhos  110  04-21 Patient evaluated at bedside.   She reports pain is well controlled. She is concerned about the Prevena draining dark blood in the tubing. She also reports nausea and episodes of nonbilious nonbloody vomiting which was persistent prior to pregnancy and worsened during pregnancy. She has tolerated juice and broth and small amounts of fruit but has not attempted to eat a regular diet, has not passed flatus yet. She is tolerating oral meds.   She denies headache, dizziness, chest pain, palpitations, shortness of breathe, nausea, vomiting or heavy vaginal bleeding.  She has been ambulating without assistance, voiding spontaneously, and is breastfeeding.    Physical Exam:  Vital Signs Last 24 Hrs  T(C): 36.7 (23 Apr 2019 06:00), Max: 37.4 (22 Apr 2019 10:03)  T(F): 98 (23 Apr 2019 06:00), Max: 99.3 (22 Apr 2019 10:03)  HR: 93 (23 Apr 2019 06:00) (80 - 104)  BP: 126/76 (23 Apr 2019 06:00) (126/70 - 154/80)  BP(mean): --  RR: 18 (23 Apr 2019 06:00) (18 - 22)  SpO2: 99% (23 Apr 2019 06:00) (96% - 100%)  CAPILLARY BLOOD GLUCOSE      POCT Blood Glucose.: 103 mg/dL (23 Apr 2019 07:03)  POCT Blood Glucose.: 81 mg/dL (22 Apr 2019 22:28)  POCT Blood Glucose.: 84 mg/dL (22 Apr 2019 19:53)  POCT Blood Glucose.: 99 mg/dL (22 Apr 2019 18:00)  POCT Blood Glucose.: 101 mg/dL (22 Apr 2019 14:59)  POCT Blood Glucose.: 73 mg/dL (22 Apr 2019 11:57)  POCT Blood Glucose.: 111 mg/dL (22 Apr 2019 10:07)      GA: NAD, A+0 x 3  CV: RRR  Pulm: mild wheezes in RLL  Breasts: soft, nontender, no palpable masses  Abd: + BS, soft, nontender, nondistended, no rebound or guarding, uterus firm at midline, at the umbilicus  Incision: well approximated, no erythema or discharge, Prevena removed and no bleeding  : lochia WNL  Extremities: no swelling or calf tenderness    I&O's Summary                          7.2    20.48 )-----------( 368      ( 21 Apr 2019 09:38 )             21.9     04-21    132<L>  |  96  |  6<L>  ----------------------------<  117<H>  3.2<L>   |  24  |  0.81    Ca    6.4<LL>      21 Apr 2019 09:38  Mg     7.4     04-21    TPro  5.1<L>  /  Alb  2.2<L>  /  TBili  <0.2  /  DBili  x   /  AST  19  /  ALT  7<L>  /  AlkPhos  110  04-21 Patient evaluated at bedside.   She reports pain is well controlled. She is concerned about the Prevena draining dark blood in the tubing. She also reports nausea and episodes of nonbilious nonbloody vomiting which was persistent prior to pregnancy and worsened during pregnancy. She has tolerated juice and broth and small amounts of fruit but has not attempted to eat a regular diet, has not passed flatus yet. She is tolerating oral meds.   She denies headache, dizziness, chest pain, palpitations, shortness of breathe, nausea, vomiting or heavy vaginal bleeding.  She has been ambulating without assistance, voiding spontaneously, and is breastfeeding.    Physical Exam:  Vital Signs Last 24 Hrs  T(C): 36.7 (23 Apr 2019 06:00), Max: 37.4 (22 Apr 2019 10:03)  T(F): 98 (23 Apr 2019 06:00), Max: 99.3 (22 Apr 2019 10:03)  HR: 93 (23 Apr 2019 06:00) (80 - 104)  BP: 126/76 (23 Apr 2019 06:00) (126/70 - 154/80)  BP(mean): --  RR: 18 (23 Apr 2019 06:00) (18 - 22)  SpO2: 99% (23 Apr 2019 06:00) (96% - 100%)  CAPILLARY BLOOD GLUCOSE      POCT Blood Glucose.: 103 mg/dL (23 Apr 2019 07:03)  POCT Blood Glucose.: 81 mg/dL (22 Apr 2019 22:28)  POCT Blood Glucose.: 84 mg/dL (22 Apr 2019 19:53)  POCT Blood Glucose.: 99 mg/dL (22 Apr 2019 18:00)  POCT Blood Glucose.: 101 mg/dL (22 Apr 2019 14:59)  POCT Blood Glucose.: 73 mg/dL (22 Apr 2019 11:57)  POCT Blood Glucose.: 111 mg/dL (22 Apr 2019 10:07)      GA: NAD, A+0 x 3  CV: RRR  Pulm: mild wheezes in RLL  Breasts: soft, nontender, no palpable masses  Abd: +normal BS, soft, nontender, nondistended, no rebound or guarding, uterus firm at midline, at the umbilicus  Incision: well approximated, no erythema or discharge, Prevena removed and no bleeding  : lochia WNL  Extremities: no swelling or calf tenderness    I&O's Summary                          7.2    20.48 )-----------( 368      ( 21 Apr 2019 09:38 )             21.9     04-21    132<L>  |  96  |  6<L>  ----------------------------<  117<H>  3.2<L>   |  24  |  0.81    Ca    6.4<LL>      21 Apr 2019 09:38  Mg     7.4     04-21    TPro  5.1<L>  /  Alb  2.2<L>  /  TBili  <0.2  /  DBili  x   /  AST  19  /  ALT  7<L>  /  AlkPhos  110  04-21

## 2019-04-23 NOTE — PROGRESS NOTE ADULT - ASSESSMENT
37 yo F POD3 s/p C/S#3, R tubal ligation, and KATH ligation c/b PPH and superimposed preeclampsia on IV Magnesium.  -superimposed PEC: s/p IV Mg, labs repeated today, BP well controlled on home regimen, repeat labs for next draw  -PPH: scant bleeding into Prevena but no active drainage, Prevena removed, hgb stable and repeat labs ordered for next draw  -T2DM: Patient still not tolerating reg diet, continue ISS until then. Home regimen: lispro 14 u prebk, 11 predinner, humalin 29 prebk and 11 predin.   -GI: Diabetic diet as tolerated, simethicone, dulcolax, will give enema if still no flatus/bm. At risk for ileus due to extensive surgical history.  -: voiding  -Pulm: albuterol prn, wheezing on exam will give duonebs, incentive spirometer  -DVT ppx: SQH 37 yo F POD3 s/p C/S#3, R tubal ligation, and KATH ligation c/b PPH and superimposed preeclampsia on IV Magnesium.  -superimposed PEC: s/p IV Mg, labs repeated today, BP well controlled on home regimen, repeat labs for next draw  -PPH: scant bleeding into Prevena but no active drainage, Prevena removed, hgb stable and repeat labs ordered for next draw  -T2DM: Patient still not tolerating reg diet, continue ISS until then. Home regimen: lispro 14 u prebk, 11 predinner, humalin 29 prebk and 11 predin.   -GI: Diabetic diet as tolerated, simethicone, dulcolax, will give enema if still no flatus/bm. Pepcid, Maalox, and Tums ordered. At risk for ileus due to extensive surgical history.  -: voiding  -Pulm: albuterol prn, wheezing on exam will give duonebs, incentive spirometer  -DVT ppx: SQH

## 2019-04-23 NOTE — CONSULT NOTE ADULT - SUBJECTIVE AND OBJECTIVE BOX
HPI:  39F PMH of DM, HTN, asthma, CIN3 s/p LEEP , CIN2 s/p LEEP , hernia c/b possible incarcerated bowel 2017 s/p mesh placement requiring 3 additional surgeries now s/p C/S#3, R tubal ligation, and KATH ligation c/b PPH and superimposed preeclampsia, and migranes.  GI was consulted for post-op ileus.  She as been taking percocet s/p  3 days ago. Admits to nausea, but no recent vomiting. She has longstanding nausea and vomiting and claims to have lost 68.5 pounds since it began.  She is currently not moving her bowels or passing gas.      PMH & Surgical Hx:   Pregnancy s/p  3 days ago  LABOR EVAL  No pertinent family history in first degree relatives  Other pregnancy-related conditions, antepartum  Weeks of gestation of pregnancy not specified  Handoff  Abdominal hernia  Hernia  Obesity  Diabetes  Hyperlipidemia  Essential hypertension  H/O ventral hernia repair  H/O:   History of D&C  H/O LEEP      Current Meds:  acetaminophen   Tablet .. 650 milliGRAM(s) Oral every 6 hours PRN  ALBUTerol    90 MICROgram(s) HFA Inhaler 1 Puff(s) Inhalation every 4 hours PRN  ALBUTerol/ipratropium for Nebulization 3 milliLiter(s) Nebulizer every 6 hours PRN  aluminum hydroxide/magnesium hydroxide/simethicone Suspension 30 milliLiter(s) Oral every 4 hours PRN  bisacodyl 5 milliGRAM(s) Oral every 12 hours PRN  calcium carbonate    500 mG (Tums) Chewable 1 Tablet(s) Chew four times a day PRN  dextrose 40% Gel 15 Gram(s) Oral once PRN  dextrose 5%. 1000 milliLiter(s) IV Continuous <Continuous>  dextrose 50% Injectable 12.5 Gram(s) IV Push once  dextrose 50% Injectable 25 Gram(s) IV Push once  dextrose 50% Injectable 25 Gram(s) IV Push once  diphenhydrAMINE 25 milliGRAM(s) Oral every 6 hours PRN  diphtheria/tetanus/pertussis (acellular) Vaccine (ADAcel) 0.5 milliLiter(s) IntraMuscular once  docusate sodium 100 milliGRAM(s) Oral two times a day PRN  famotidine    Tablet 20 milliGRAM(s) Oral two times a day  ferrous    sulfate 325 milliGRAM(s) Oral daily  glucagon  Injectable 1 milliGRAM(s) IntraMuscular once PRN  glycerin Suppository - Adult 1 Suppository(s) Rectal at bedtime PRN  heparin  Injectable 7500 Unit(s) SubCutaneous every 8 hours  ibuprofen  Tablet. 600 milliGRAM(s) Oral every 6 hours  insulin lispro (HumaLOG) corrective regimen sliding scale   SubCutaneous Before meals and at bedtime  insulin lispro Injectable (HumaLOG) 14 Unit(s) SubCutaneous before breakfast  insulin lispro Injectable (HumaLOG) 11 Unit(s) SubCutaneous before dinner  insulin NPH human recombinant 29 Unit(s) SubCutaneous before breakfast  insulin NPH human recombinant 11 Unit(s) SubCutaneous at bedtime  labetalol 200 milliGRAM(s) Oral every 12 hours  lactated ringers. 1000 milliLiter(s) IV Continuous <Continuous>  lanolin Ointment 1 Application(s) Topical every 3 hours PRN  ondansetron    Tablet 4 milliGRAM(s) Oral every 6 hours PRN  oxyCODONE    5 mG/acetaminophen 325 mG 1 Tablet(s) Oral every 3 hours PRN  oxyCODONE    5 mG/acetaminophen 325 mG 2 Tablet(s) Oral every 6 hours PRN  oxytocin Infusion 333.333 milliUNIT(s)/Min IV Continuous <Continuous>  oxytocin Infusion 41.667 milliUNIT(s)/Min IV Continuous <Continuous>  oxytocin Infusion 41.667 milliUNIT(s)/Min IV Continuous <Continuous>  polyethylene glycol 3350 17 Gram(s) Oral daily  prenatal multivitamin 1 Tablet(s) Oral daily  saline laxative (FLEET) Rectal Enema 1 Enema Rectal once PRN  simethicone 80 milliGRAM(s) Chew every 4 hours PRN  tiotropium 18 MICROgram(s) Capsule 1 Capsule(s) Inhalation daily PRN      Allergies:  amoxicillin (Unknown)  iodine containing compounds (Hives; Anaphylaxis)  penicillin (Hives)  shellfish. (Hives; Anaphylaxis)      ROS:  Denies the following except as indicated.    General: weight loss/weight gain, decreased appetite, fatigue  Eyes: Blurry vision, double vision, visual changes  ENT: Throat pain, changes in voice,   CV: palpitations, SOB, CP, cough  GI: abdominal pain at surgical incision sites (LLQ and RLQ) and epigastric.  Has nausea but no vomiting.  Not passing gas or having BMs.  : polyuria, dysuria  Endo: abnormal menses, temperature intolerance, decreased libido  MSK: weakness, joint pain  Skin: rash, dryness, diaphoresis  Heme: Easy bruising, bleeding  Neuro: HA, dizziness, lightheadedness, numbness tingling  Psych: Anxiety, Depression    Vital Signs Last 24 Hrs  T(C): 36.7 (2019 06:00), Max: 37.2 (2019 18:05)  T(F): 98 (2019 06:00), Max: 99 (2019 18:05)  HR: 93 (2019 06:00) (80 - 104)  BP: 126/76 (2019 06:00) (126/70 - 154/80)  BP(mean): --  RR: 18 (2019 06:00) (18 - 22)  SpO2: 99% (2019 06:00) (96% - 100%)  Height (cm): 170.1 ( @ 05:19)  Weight (kg): 103.4 ( @ 05:19)  BMI (kg/m2): 35.7 ( @ 05:19)    Physical Exam:  Constitutional: wn/wd in NAD.   HEENT: NCAT, MMM, OP clear, EOMI, , no proptosis or lid retraction  Neck: no thyromegaly or palpable thyroid nodules   Respiratory: lungs CTAB.  Cardiovascular: regular rhythm, normal S1 and S2, no audible murmurs, no peripheral edema  GI: soft, no masses/HSM appreciated, Tender to palpation near surgical sites and epigastrium. Hypoactive bowel sounds.  Neurology: no tremors, DTR 2+  Skin: no visible rashes/lesions  Psychiatric: AAO x 3, normal affect/mood.  Ext: radial pulses intact, DP pulses intact, extremities warm, no cyanosis, clubbing or edema.       LABS:               7.2    20.48 )-----------( 368      ( 2019 09:38 )             21.9     04-21    132<L>  |  96  |  6<L>  ----------------------------<  117<H>  3.2<L>   |  24  |  0.81    Ca    6.4<LL>      2019 09:38  Mg     7.4     04-21    TPro  5.1<L>  /  Alb  2.2<L>  /  TBili  <0.2  /  DBili  x   /  AST  19  /  ALT  7<L>  /  AlkPhos  110  -21      RADIOLOGY & ADDITIONAL STUDIES:  None HPI:  39F PMH of DM, HTN, asthma, CIN3 s/p LEEP , CIN2 s/p LEEP , hernia c/b possible incarcerated bowel  s/p mesh placement requiring 3 additional surgeries now s/p C/S#3, R tubal ligation, and KATH ligation c/b PPH and superimposed preeclampsia, and migranes.  GI was consulted for constipation. Pt reports chronic abd pain but slightly worse since surgery. She describes it as located at site if incision. She as been taking percocet s/p  3 days ago, but is trying to cut down. Admits to nausea, but no recent vomiting. She has not had BM since surgery. Not currently passing gas. Tolerating diet    PMH & Surgical Hx:   Pregnancy s/p  3 days ago  LABOR EVAL  No pertinent family history in first degree relatives  Other pregnancy-related conditions, antepartum  Weeks of gestation of pregnancy not specified  Handoff  Abdominal hernia  Hernia  Obesity  Diabetes  Hyperlipidemia  Essential hypertension  H/O ventral hernia repair  H/O:   History of D&C  H/O LEEP      Current Meds:  acetaminophen   Tablet .. 650 milliGRAM(s) Oral every 6 hours PRN  ALBUTerol    90 MICROgram(s) HFA Inhaler 1 Puff(s) Inhalation every 4 hours PRN  ALBUTerol/ipratropium for Nebulization 3 milliLiter(s) Nebulizer every 6 hours PRN  aluminum hydroxide/magnesium hydroxide/simethicone Suspension 30 milliLiter(s) Oral every 4 hours PRN  bisacodyl 5 milliGRAM(s) Oral every 12 hours PRN  calcium carbonate    500 mG (Tums) Chewable 1 Tablet(s) Chew four times a day PRN  dextrose 40% Gel 15 Gram(s) Oral once PRN  dextrose 5%. 1000 milliLiter(s) IV Continuous <Continuous>  dextrose 50% Injectable 12.5 Gram(s) IV Push once  dextrose 50% Injectable 25 Gram(s) IV Push once  dextrose 50% Injectable 25 Gram(s) IV Push once  diphenhydrAMINE 25 milliGRAM(s) Oral every 6 hours PRN  diphtheria/tetanus/pertussis (acellular) Vaccine (ADAcel) 0.5 milliLiter(s) IntraMuscular once  docusate sodium 100 milliGRAM(s) Oral two times a day PRN  famotidine    Tablet 20 milliGRAM(s) Oral two times a day  ferrous    sulfate 325 milliGRAM(s) Oral daily  glucagon  Injectable 1 milliGRAM(s) IntraMuscular once PRN  glycerin Suppository - Adult 1 Suppository(s) Rectal at bedtime PRN  heparin  Injectable 7500 Unit(s) SubCutaneous every 8 hours  ibuprofen  Tablet. 600 milliGRAM(s) Oral every 6 hours  insulin lispro (HumaLOG) corrective regimen sliding scale   SubCutaneous Before meals and at bedtime  insulin lispro Injectable (HumaLOG) 14 Unit(s) SubCutaneous before breakfast  insulin lispro Injectable (HumaLOG) 11 Unit(s) SubCutaneous before dinner  insulin NPH human recombinant 29 Unit(s) SubCutaneous before breakfast  insulin NPH human recombinant 11 Unit(s) SubCutaneous at bedtime  labetalol 200 milliGRAM(s) Oral every 12 hours  lactated ringers. 1000 milliLiter(s) IV Continuous <Continuous>  lanolin Ointment 1 Application(s) Topical every 3 hours PRN  ondansetron    Tablet 4 milliGRAM(s) Oral every 6 hours PRN  oxyCODONE    5 mG/acetaminophen 325 mG 1 Tablet(s) Oral every 3 hours PRN  oxyCODONE    5 mG/acetaminophen 325 mG 2 Tablet(s) Oral every 6 hours PRN  oxytocin Infusion 333.333 milliUNIT(s)/Min IV Continuous <Continuous>  oxytocin Infusion 41.667 milliUNIT(s)/Min IV Continuous <Continuous>  oxytocin Infusion 41.667 milliUNIT(s)/Min IV Continuous <Continuous>  polyethylene glycol 3350 17 Gram(s) Oral daily  prenatal multivitamin 1 Tablet(s) Oral daily  saline laxative (FLEET) Rectal Enema 1 Enema Rectal once PRN  simethicone 80 milliGRAM(s) Chew every 4 hours PRN  tiotropium 18 MICROgram(s) Capsule 1 Capsule(s) Inhalation daily PRN      Allergies:  amoxicillin (Unknown)  iodine containing compounds (Hives; Anaphylaxis)  penicillin (Hives)  shellfish. (Hives; Anaphylaxis)      ROS:  Denies the following except as indicated.    General: weight loss/weight gain, decreased appetite, fatigue  Eyes: Blurry vision, double vision, visual changes  ENT: Throat pain, changes in voice,   CV: palpitations, SOB, CP, cough  GI: abdominal pain at surgical incision sites (LLQ and RLQ) and epigastric.  Has nausea but no vomiting.  Not passing gas or having BMs.  : polyuria, dysuria  Endo: abnormal menses, temperature intolerance, decreased libido  MSK: weakness, joint pain  Skin: rash, dryness, diaphoresis  Heme: Easy bruising, bleeding  Neuro: HA, dizziness, lightheadedness, numbness tingling  Psych: Anxiety, Depression    Vital Signs Last 24 Hrs  T(C): 36.7 (2019 06:00), Max: 37.2 (2019 18:05)  T(F): 98 (2019 06:00), Max: 99 (2019 18:05)  HR: 93 (2019 06:00) (80 - 104)  BP: 126/76 (2019 06:00) (126/70 - 154/80)  BP(mean): --  RR: 18 (2019 06:00) (18 - 22)  SpO2: 99% (:00) (96% - 100%)  Height (cm): 170.1 ( @ 05:19)  Weight (kg): 103.4 ( @ 05:19)  BMI (kg/m2): 35.7 ( @ 05:19)    Physical Exam:  Constitutional: wn/wd in NAD.   HEENT: NCAT, MMM, OP clear, EOMI, , no proptosis or lid retraction  Neck: no thyromegaly or palpable thyroid nodules   Respiratory: lungs CTAB.  Cardiovascular: regular rhythm, normal S1 and S2, no audible murmurs, no peripheral edema  GI: soft, no masses/HSM appreciated, Tender to palpation near surgical sites and epigastrium. Hypoactive bowel sounds. no peritoneal signs.  Neurology: no tremors, DTR 2+  Skin: no visible rashes/lesions  Psychiatric: AAO x 3, normal affect/mood.  Ext: radial pulses intact, DP pulses intact, extremities warm, no cyanosis, clubbing or edema.       LABS:               7.2    20.48 )-----------( 368      ( 2019 09:38 )             21.9     04-    132<L>  |  96  |  6<L>  ----------------------------<  117<H>  3.2<L>   |  24  |  0.81    Ca    6.4<LL>      2019 09:38  Mg     7.4     -    TPro  5.1<L>  /  Alb  2.2<L>  /  TBili  <0.2  /  DBili  x   /  AST  19  /  ALT  7<L>  /  AlkPhos  110  -      RADIOLOGY & ADDITIONAL STUDIES:  None HPI:  39F PMH of DM, HTN, asthma, CIN3 s/p LEEP , CIN2 s/p LEEP , hernia c/b possible incarcerated bowel  s/p mesh placement requiring 3 additional surgeries now s/p C/S#3, R tubal ligation, and KATH ligation c/b PPH and superimposed preeclampsia, and migranes.  GI was consulted for constipation. Pt reports chronic abd pain but slightly worse since surgery. She describes it as located at site if incision. She as been taking percocet s/p  3 days ago, but is trying to cut down. Admits to nausea, but no recent vomiting. She has not had BM since surgery. Not currently passing gas. Tolerating diet    PMH & Surgical Hx:   Pregnancy s/p  3 days ago  LABOR EVAL  No pertinent family history in first degree relatives  Other pregnancy-related conditions, antepartum  Weeks of gestation of pregnancy not specified  Handoff  Abdominal hernia  Hernia  Obesity  Diabetes  Hyperlipidemia  Essential hypertension  H/O ventral hernia repair  H/O:   History of D&C  H/O LEEP      Current Meds:  acetaminophen   Tablet .. 650 milliGRAM(s) Oral every 6 hours PRN  ALBUTerol    90 MICROgram(s) HFA Inhaler 1 Puff(s) Inhalation every 4 hours PRN  ALBUTerol/ipratropium for Nebulization 3 milliLiter(s) Nebulizer every 6 hours PRN  aluminum hydroxide/magnesium hydroxide/simethicone Suspension 30 milliLiter(s) Oral every 4 hours PRN  bisacodyl 5 milliGRAM(s) Oral every 12 hours PRN  calcium carbonate    500 mG (Tums) Chewable 1 Tablet(s) Chew four times a day PRN  dextrose 40% Gel 15 Gram(s) Oral once PRN  dextrose 5%. 1000 milliLiter(s) IV Continuous <Continuous>  dextrose 50% Injectable 12.5 Gram(s) IV Push once  dextrose 50% Injectable 25 Gram(s) IV Push once  dextrose 50% Injectable 25 Gram(s) IV Push once  diphenhydrAMINE 25 milliGRAM(s) Oral every 6 hours PRN  diphtheria/tetanus/pertussis (acellular) Vaccine (ADAcel) 0.5 milliLiter(s) IntraMuscular once  docusate sodium 100 milliGRAM(s) Oral two times a day PRN  famotidine    Tablet 20 milliGRAM(s) Oral two times a day  ferrous    sulfate 325 milliGRAM(s) Oral daily  glucagon  Injectable 1 milliGRAM(s) IntraMuscular once PRN  glycerin Suppository - Adult 1 Suppository(s) Rectal at bedtime PRN  heparin  Injectable 7500 Unit(s) SubCutaneous every 8 hours  ibuprofen  Tablet. 600 milliGRAM(s) Oral every 6 hours  insulin lispro (HumaLOG) corrective regimen sliding scale   SubCutaneous Before meals and at bedtime  insulin lispro Injectable (HumaLOG) 14 Unit(s) SubCutaneous before breakfast  insulin lispro Injectable (HumaLOG) 11 Unit(s) SubCutaneous before dinner  insulin NPH human recombinant 29 Unit(s) SubCutaneous before breakfast  insulin NPH human recombinant 11 Unit(s) SubCutaneous at bedtime  labetalol 200 milliGRAM(s) Oral every 12 hours  lactated ringers. 1000 milliLiter(s) IV Continuous <Continuous>  lanolin Ointment 1 Application(s) Topical every 3 hours PRN  ondansetron    Tablet 4 milliGRAM(s) Oral every 6 hours PRN  oxyCODONE    5 mG/acetaminophen 325 mG 1 Tablet(s) Oral every 3 hours PRN  oxyCODONE    5 mG/acetaminophen 325 mG 2 Tablet(s) Oral every 6 hours PRN  oxytocin Infusion 333.333 milliUNIT(s)/Min IV Continuous <Continuous>  oxytocin Infusion 41.667 milliUNIT(s)/Min IV Continuous <Continuous>  oxytocin Infusion 41.667 milliUNIT(s)/Min IV Continuous <Continuous>  polyethylene glycol 3350 17 Gram(s) Oral daily  prenatal multivitamin 1 Tablet(s) Oral daily  saline laxative (FLEET) Rectal Enema 1 Enema Rectal once PRN  simethicone 80 milliGRAM(s) Chew every 4 hours PRN  tiotropium 18 MICROgram(s) Capsule 1 Capsule(s) Inhalation daily PRN      Allergies:  amoxicillin (Unknown)  iodine containing compounds (Hives; Anaphylaxis)  penicillin (Hives)  shellfish. (Hives; Anaphylaxis)      ROS:  Denies the following except as indicated.    General: weight loss/weight gain, decreased appetite, fatigue  Eyes: Blurry vision, double vision, visual changes  ENT: Throat pain, changes in voice,   CV: palpitations, SOB, CP, cough  GI: abdominal pain at surgical incision sites (LLQ and RLQ) and epigastric.  Has nausea but no vomiting.  Not passing gas or having BMs.  : polyuria, dysuria  Endo: abnormal menses, temperature intolerance, decreased libido  MSK: weakness, joint pain  Skin: rash, dryness, diaphoresis  Heme: Easy bruising, bleeding  Neuro: HA, dizziness, lightheadedness, numbness tingling  Psych: Anxiety, Depression    Vital Signs Last 24 Hrs  T(C): 36.7 (2019 06:00), Max: 37.2 (2019 18:05)  T(F): 98 (2019 06:00), Max: 99 (2019 18:05)  HR: 93 (2019 06:00) (80 - 104)  BP: 126/76 (2019 06:00) (126/70 - 154/80)  BP(mean): --  RR: 18 (2019 06:00) (18 - 22)  SpO2: 99% (2019 06:00) (96% - 100%)  Height (cm): 170.1 ( @ 05:19)  Weight (kg): 103.4 ( @ 05:19)  BMI (kg/m2): 35.7 ( @ 05:19)    Physical Exam:  Constitutional: wn/wd in NAD.   HEENT: NCAT, MMM, OP clear, EOMI, , no proptosis or lid retraction  Neck: no thyromegaly or palpable thyroid nodules   Respiratory: lungs CTAB.  Cardiovascular: regular rhythm, normal S1 and S2, no audible murmurs, no peripheral edema  GI: soft, no masses/HSM appreciated, Tender to palpation near surgical sites and epigastrium. incision appears c/d/i. Hypoactive bowel sounds. no peritoneal signs.  Neurology: no tremors, DTR 2+  Skin: no visible rashes/lesions  Psychiatric: AAO x 3, normal affect/mood.  Ext: radial pulses intact, DP pulses intact, extremities warm, no cyanosis, clubbing or edema.       LABS:               7.2    20.48 )-----------( 368      ( 2019 09:38 )             21.9     04-21    132<L>  |  96  |  6<L>  ----------------------------<  117<H>  3.2<L>   |  24  |  0.81    Ca    6.4<LL>      2019 09:38  Mg     7.4     -    TPro  5.1<L>  /  Alb  2.2<L>  /  TBili  <0.2  /  DBili  x   /  AST  19  /  ALT  7<L>  /  AlkPhos  110  -      RADIOLOGY & ADDITIONAL STUDIES:  None

## 2019-04-23 NOTE — CONSULT NOTE ADULT - ASSESSMENT
39yoF with a PMH of DM, HTN, asthma, CIN3 s/p LEEP 1999, CIN2 s/p LEEP 2005, herna c/b possible incarcerated bowel 2017 s/p mesh placement requiring 3 additional surgeries now s/p C/S#3, R tubal ligation, and KATH ligation c/b PPH and superimposed preeclampsia. Endocrinology consulted for DM management    Recommendations  - will updated after rounds with attending 39yoF a PMH of DM, HTN, asthma, CIN3 s/p LEEP 1999, CIN2 s/p LEEP 2005, herna c/b possible incarcerated bowel 2017 s/p mesh placement requiring 3 additional surgeries, now POD3 s/p C/S#3, R tubal ligation, and KATH ligation c/b postpartum hemorrhage.     # Type 2 DM  - Dx 2005, been on metformin since 2007 1000mg BID  - 2mo OBGYN started Lispro 14U before breakfast, 11U before dinner. NPH 29U before breakfast, 11U before dinner  - 4/20 , additional 4U lispro given  - diet has been poor since admission mostly juice/broth d/t nausea  Recommendations  - will updated after rounds with attending 39yoF a PMH of DM, HTN, asthma, CIN3 s/p LEEP 1999, CIN2 s/p LEEP 2005, herna c/b possible incarcerated bowel 2017 s/p mesh placement requiring 3 additional surgeries, now POD3 s/p C/S#3, R tubal ligation, and KATH ligation c/b postpartum hemorrhage.     # Type 2 DM  - Dx 2005, been on metformin since 2007 1000mg BID  - 2mo OBGYN started Lispro 14U before breakfast, 11U before dinner. NPH 29U before breakfast, 11U before dinner  - 4/20 , additional 4U lispro given  - diet has been poor since admission mostly juice/broth d/t nausea  Recommendations  - no standing insulin, just sliding scale insulin 39yoF a PMH of DM, HTN, asthma, CIN3 s/p LEEP 1999, CIN2 s/p LEEP 2005, herna c/b possible incarcerated bowel 2017 s/p mesh placement requiring 3 additional surgeries, now POD3 s/p C/S#3, R tubal ligation, and KATH ligation c/b postpartum hemorrhage.     # Type 2 DM  - Dx 2005, been on metformin since 2007 1000mg BID  - 2mo OBGYN started Lispro 14U before breakfast, 11U before dinner. NPH 29U before breakfast, 11U before dinner  - 4/20 , additional 4U lispro given  - diet has been poor since admission mostly juice/broth d/t nausea  Recommendations  - no standing insulin, just sliding scale insulin  - will continue to monitor  - Pt can follow up at discharge with [facility] by calling  to make an appointment.

## 2019-04-24 LAB
ALBUMIN SERPL ELPH-MCNC: 2.4 G/DL — LOW (ref 3.3–5)
ALP SERPL-CCNC: 104 U/L — SIGNIFICANT CHANGE UP (ref 40–120)
ALT FLD-CCNC: 17 U/L — SIGNIFICANT CHANGE UP (ref 10–45)
ANION GAP SERPL CALC-SCNC: 11 MMOL/L — SIGNIFICANT CHANGE UP (ref 5–17)
AST SERPL-CCNC: 31 U/L — SIGNIFICANT CHANGE UP (ref 10–40)
BILIRUB SERPL-MCNC: 0.6 MG/DL — SIGNIFICANT CHANGE UP (ref 0.2–1.2)
BUN SERPL-MCNC: 6 MG/DL — LOW (ref 7–23)
CALCIUM SERPL-MCNC: 8 MG/DL — LOW (ref 8.4–10.5)
CHLORIDE SERPL-SCNC: 103 MMOL/L — SIGNIFICANT CHANGE UP (ref 96–108)
CO2 SERPL-SCNC: 24 MMOL/L — SIGNIFICANT CHANGE UP (ref 22–31)
CREAT SERPL-MCNC: 0.69 MG/DL — SIGNIFICANT CHANGE UP (ref 0.5–1.3)
GLUCOSE BLDC GLUCOMTR-MCNC: 106 MG/DL — HIGH (ref 70–99)
GLUCOSE BLDC GLUCOMTR-MCNC: 130 MG/DL — HIGH (ref 70–99)
GLUCOSE BLDC GLUCOMTR-MCNC: 177 MG/DL — HIGH (ref 70–99)
GLUCOSE BLDC GLUCOMTR-MCNC: 89 MG/DL — SIGNIFICANT CHANGE UP (ref 70–99)
GLUCOSE BLDC GLUCOMTR-MCNC: 91 MG/DL — SIGNIFICANT CHANGE UP (ref 70–99)
GLUCOSE SERPL-MCNC: 73 MG/DL — SIGNIFICANT CHANGE UP (ref 70–99)
HCT VFR BLD CALC: 23.7 % — LOW (ref 34.5–45)
HGB BLD-MCNC: 7.5 G/DL — LOW (ref 11.5–15.5)
MAGNESIUM SERPL-MCNC: 2.4 MG/DL — SIGNIFICANT CHANGE UP (ref 1.6–2.6)
MCHC RBC-ENTMCNC: 26.9 PG — LOW (ref 27–34)
MCHC RBC-ENTMCNC: 31.6 GM/DL — LOW (ref 32–36)
MCV RBC AUTO: 84.9 FL — SIGNIFICANT CHANGE UP (ref 80–100)
NRBC # BLD: 0 /100 WBCS — SIGNIFICANT CHANGE UP (ref 0–0)
PHOSPHATE SERPL-MCNC: 2.8 MG/DL — SIGNIFICANT CHANGE UP (ref 2.5–4.5)
PLATELET # BLD AUTO: 387 K/UL — SIGNIFICANT CHANGE UP (ref 150–400)
POTASSIUM SERPL-MCNC: 3.7 MMOL/L — SIGNIFICANT CHANGE UP (ref 3.5–5.3)
POTASSIUM SERPL-SCNC: 3.7 MMOL/L — SIGNIFICANT CHANGE UP (ref 3.5–5.3)
PROT SERPL-MCNC: 6.2 G/DL — SIGNIFICANT CHANGE UP (ref 6–8.3)
RBC # BLD: 2.79 M/UL — LOW (ref 3.8–5.2)
RBC # FLD: 16.7 % — HIGH (ref 10.3–14.5)
SODIUM SERPL-SCNC: 138 MMOL/L — SIGNIFICANT CHANGE UP (ref 135–145)
WBC # BLD: 10.89 K/UL — HIGH (ref 3.8–10.5)
WBC # FLD AUTO: 10.89 K/UL — HIGH (ref 3.8–10.5)

## 2019-04-24 PROCEDURE — 99231 SBSQ HOSP IP/OBS SF/LOW 25: CPT

## 2019-04-24 RX ORDER — POLYETHYLENE GLYCOL 3350 17 G/17G
17 POWDER, FOR SOLUTION ORAL
Qty: 0 | Refills: 0 | Status: DISCONTINUED | OUTPATIENT
Start: 2019-04-24 | End: 2019-04-25

## 2019-04-24 RX ORDER — ONDANSETRON 8 MG/1
4 TABLET, FILM COATED ORAL EVERY 6 HOURS
Qty: 0 | Refills: 0 | Status: DISCONTINUED | OUTPATIENT
Start: 2019-04-24 | End: 2019-04-24

## 2019-04-24 RX ORDER — ZINC OXIDE 200 MG/G
1 OINTMENT TOPICAL DAILY
Qty: 0 | Refills: 0 | Status: DISCONTINUED | OUTPATIENT
Start: 2019-04-24 | End: 2019-04-25

## 2019-04-24 RX ORDER — ONDANSETRON 8 MG/1
4 TABLET, FILM COATED ORAL ONCE
Qty: 0 | Refills: 0 | Status: DISCONTINUED | OUTPATIENT
Start: 2019-04-24 | End: 2019-04-25

## 2019-04-24 RX ADMIN — Medication 1 TABLET(S): at 12:48

## 2019-04-24 RX ADMIN — OXYCODONE AND ACETAMINOPHEN 1 TABLET(S): 5; 325 TABLET ORAL at 18:05

## 2019-04-24 RX ADMIN — OXYCODONE AND ACETAMINOPHEN 1 TABLET(S): 5; 325 TABLET ORAL at 01:01

## 2019-04-24 RX ADMIN — Medication 600 MILLIGRAM(S): at 09:14

## 2019-04-24 RX ADMIN — HEPARIN SODIUM 7500 UNIT(S): 5000 INJECTION INTRAVENOUS; SUBCUTANEOUS at 17:12

## 2019-04-24 RX ADMIN — Medication 200 MILLIGRAM(S): at 14:53

## 2019-04-24 RX ADMIN — Medication 100 MILLIGRAM(S): at 09:14

## 2019-04-24 RX ADMIN — Medication 600 MILLIGRAM(S): at 22:21

## 2019-04-24 RX ADMIN — OXYCODONE AND ACETAMINOPHEN 1 TABLET(S): 5; 325 TABLET ORAL at 17:13

## 2019-04-24 RX ADMIN — Medication 2: at 14:54

## 2019-04-24 RX ADMIN — ONDANSETRON 4 MILLIGRAM(S): 8 TABLET, FILM COATED ORAL at 06:57

## 2019-04-24 RX ADMIN — ONDANSETRON 4 MILLIGRAM(S): 8 TABLET, FILM COATED ORAL at 18:06

## 2019-04-24 RX ADMIN — FAMOTIDINE 20 MILLIGRAM(S): 10 INJECTION INTRAVENOUS at 01:10

## 2019-04-24 RX ADMIN — OXYCODONE AND ACETAMINOPHEN 1 TABLET(S): 5; 325 TABLET ORAL at 12:52

## 2019-04-24 RX ADMIN — Medication 325 MILLIGRAM(S): at 12:49

## 2019-04-24 RX ADMIN — OXYCODONE AND ACETAMINOPHEN 1 TABLET(S): 5; 325 TABLET ORAL at 06:41

## 2019-04-24 RX ADMIN — OXYCODONE AND ACETAMINOPHEN 1 TABLET(S): 5; 325 TABLET ORAL at 13:05

## 2019-04-24 RX ADMIN — Medication 600 MILLIGRAM(S): at 15:06

## 2019-04-24 RX ADMIN — FAMOTIDINE 20 MILLIGRAM(S): 10 INJECTION INTRAVENOUS at 12:49

## 2019-04-24 RX ADMIN — HEPARIN SODIUM 7500 UNIT(S): 5000 INJECTION INTRAVENOUS; SUBCUTANEOUS at 01:10

## 2019-04-24 RX ADMIN — Medication 600 MILLIGRAM(S): at 23:00

## 2019-04-24 RX ADMIN — OXYCODONE AND ACETAMINOPHEN 1 TABLET(S): 5; 325 TABLET ORAL at 07:37

## 2019-04-24 RX ADMIN — ONDANSETRON 4 MILLIGRAM(S): 8 TABLET, FILM COATED ORAL at 12:48

## 2019-04-24 RX ADMIN — Medication 200 MILLIGRAM(S): at 02:50

## 2019-04-24 RX ADMIN — Medication 600 MILLIGRAM(S): at 14:53

## 2019-04-24 RX ADMIN — Medication 600 MILLIGRAM(S): at 10:05

## 2019-04-24 NOTE — PROGRESS NOTE ADULT - SUBJECTIVE AND OBJECTIVE BOX
INTERVAL HPI/OVERNIGHT EVENTS: FAUSTINO    pt continues to complain of n/v. Otherwise denies fever, chills, chest pain, SOB.    MEDICATIONS  (STANDING):  dextrose 5%. 1000 milliLiter(s) (50 mL/Hr) IV Continuous <Continuous>  dextrose 50% Injectable 12.5 Gram(s) IV Push once  dextrose 50% Injectable 25 Gram(s) IV Push once  dextrose 50% Injectable 25 Gram(s) IV Push once  diphtheria/tetanus/pertussis (acellular) Vaccine (ADAcel) 0.5 milliLiter(s) IntraMuscular once  famotidine    Tablet 20 milliGRAM(s) Oral two times a day  ferrous    sulfate 325 milliGRAM(s) Oral daily  heparin  Injectable 7500 Unit(s) SubCutaneous every 8 hours  ibuprofen  Tablet. 600 milliGRAM(s) Oral every 6 hours  insulin lispro (HumaLOG) corrective regimen sliding scale   SubCutaneous Before meals and at bedtime  labetalol 200 milliGRAM(s) Oral every 12 hours  lactated ringers. 1000 milliLiter(s) (125 mL/Hr) IV Continuous <Continuous>  ondansetron Injectable 4 milliGRAM(s) IV Push once  oxytocin Infusion 333.333 milliUNIT(s)/Min (1000 mL/Hr) IV Continuous <Continuous>  oxytocin Infusion 41.667 milliUNIT(s)/Min (125 mL/Hr) IV Continuous <Continuous>  oxytocin Infusion 41.667 milliUNIT(s)/Min (125 mL/Hr) IV Continuous <Continuous>  polyethylene glycol 3350 17 Gram(s) Oral two times a day  prenatal multivitamin 1 Tablet(s) Oral daily  zinc oxide 20% Ointment 1 Application(s) Topical daily    MEDICATIONS  (PRN):  acetaminophen   Tablet .. 650 milliGRAM(s) Oral every 6 hours PRN Temp greater or equal to 38C (100.4F), Mild Pain (1 - 3)  ALBUTerol    90 MICROgram(s) HFA Inhaler 1 Puff(s) Inhalation every 4 hours PRN Shortness of Breath and/or Wheezing  ALBUTerol/ipratropium for Nebulization 3 milliLiter(s) Nebulizer every 6 hours PRN Shortness of Breath and/or Wheezing  aluminum hydroxide/magnesium hydroxide/simethicone Suspension 30 milliLiter(s) Oral every 4 hours PRN Dyspepsia  bisacodyl 5 milliGRAM(s) Oral every 12 hours PRN Constipation  calcium carbonate    500 mG (Tums) Chewable 1 Tablet(s) Chew four times a day PRN Indigestion  dextrose 40% Gel 15 Gram(s) Oral once PRN Blood Glucose LESS THAN 70 milliGRAM(s)/deciliter  diphenhydrAMINE 25 milliGRAM(s) Oral every 6 hours PRN Itching  docusate sodium 100 milliGRAM(s) Oral two times a day PRN Stool Softening  glucagon  Injectable 1 milliGRAM(s) IntraMuscular once PRN Glucose LESS THAN 70 milligrams/deciliter  glycerin Suppository - Adult 1 Suppository(s) Rectal at bedtime PRN Constipation  lanolin Ointment 1 Application(s) Topical every 3 hours PRN Sore Nipples  ondansetron    Tablet 4 milliGRAM(s) Oral every 6 hours PRN Nausea and/or Vomiting  oxyCODONE    5 mG/acetaminophen 325 mG 1 Tablet(s) Oral every 3 hours PRN Moderate Pain (4 - 6)  oxyCODONE    5 mG/acetaminophen 325 mG 2 Tablet(s) Oral every 6 hours PRN Severe Pain (7 - 10)  saline laxative (FLEET) Rectal Enema 1 Enema Rectal once PRN if no BM after Dulcolax  simethicone 80 milliGRAM(s) Chew every 4 hours PRN Gas  tiotropium 18 MICROgram(s) Capsule 1 Capsule(s) Inhalation daily PRN wheezing      PHYSICAL EXAM  Vital Signs Last 24 Hrs  T(C): 36.8 (24 Apr 2019 14:00), Max: 37.2 (23 Apr 2019 22:13)  T(F): 98.2 (24 Apr 2019 14:00), Max: 98.9 (23 Apr 2019 22:13)  HR: 85 (24 Apr 2019 18:00) (79 - 93)  BP: 147/85 (24 Apr 2019 18:00) (126/82 - 154/86)  BP(mean): --  RR: 18 (24 Apr 2019 18:00) (16 - 18)  SpO2: 100% (24 Apr 2019 18:00) (99% - 100%)    Constitutional: wn/wd in NAD.   HEENT: NCAT, MMM, OP clear, EOMI, no proptosis or lid retraction  Neck: no thyromegaly or palpable thyroid nodules   Respiratory: lungs CTAB.  Cardiovascular: regular rhythm, normal S1 and S2, no audible murmurs, no peripheral edema  GI: soft, NT/ND, no masses/HSM appreciated.  Neurology: no tremors, DTR 2+  Skin: no visible rashes/lesions  Psychiatric: AAO x 3, normal affect/mood.    LABS:                        7.5    10.89 )-----------( 387      ( 24 Apr 2019 07:10 )             23.7     04-24    138  |  103  |  6<L>  ----------------------------<  73  3.7   |  24  |  0.69    Ca    8.0<L>      24 Apr 2019 07:10  Phos  2.8     04-24  Mg     2.4     04-24    TPro  6.2  /  Alb  2.4<L>  /  TBili  0.6  /  DBili  x   /  AST  31  /  ALT  17  /  AlkPhos  104  04-24            HbA1C: 6.0 % (04-23 @ 11:31)    CAPILLARY BLOOD GLUCOSE      POCT Blood Glucose.: 106 mg/dL (24 Apr 2019 15:57)  POCT Blood Glucose.: 177 mg/dL (24 Apr 2019 14:24)  POCT Blood Glucose.: 91 mg/dL (24 Apr 2019 06:47)  POCT Blood Glucose.: 89 mg/dL (24 Apr 2019 00:50)  POCT Blood Glucose.: 138 mg/dL (23 Apr 2019 22:36)      Insulin Sliding Scale requirements X 24 Hours:    RADIOLOGY & ADDITIONAL TESTS:

## 2019-04-24 NOTE — PROGRESS NOTE ADULT - ASSESSMENT
39yoF a PMH of DM, HTN, asthma, CIN3 s/p LEEP 1999, CIN2 s/p LEEP 2005, herna c/b possible incarcerated bowel 2017 s/p mesh placement requiring 3 additional surgeries, now POD3 s/p C/S#3, R tubal ligation, and KATH ligation c/b postpartum hemorrhage.     # Type 2 DM  - Dx 2005, been on metformin since 2007 1000mg BID  - 2mo OBGYN started Lispro 14U before breakfast, 11U before dinner. NPH 29U before breakfast, 11U before dinner  - 4/20 , additional 4U lispro given  - diet has been poor since admission mostly juice/broth d/t nausea  Recommendations  - c/w only sliding scale insulin. Will restart previously metformin regimen once discharged  - will continue to monitor  - Pt can follow up at discharge with  [facility] by calling  to make an appointment.

## 2019-04-24 NOTE — PROGRESS NOTE ADULT - SUBJECTIVE AND OBJECTIVE BOX
Patient evaluated at bedside.   She reports pain is well controlled. She continues to have nausea, no vomiting overnight. Had orange juice for dinner, still not tolerating solids and having nausea. Has not passed flatus or had BM.   She denies headache, dizziness, chest pain, palpitations, shortness of breathe, nausea, vomiting or heavy vaginal bleeding.  She has been ambulating without assistance, voiding spontaneously, and is breastfeeding.      Vital Signs Last 24 Hrs  T(C): 36.6 (24 Apr 2019 06:32), Max: 37.4 (23 Apr 2019 14:00)  T(F): 97.8 (24 Apr 2019 06:32), Max: 99.3 (23 Apr 2019 14:00)  HR: 83 (24 Apr 2019 06:32) (82 - 89)  BP: 132/81 (24 Apr 2019 06:32) (105/68 - 135/84)  BP(mean): --  RR: 17 (24 Apr 2019 06:32) (16 - 17)  SpO2: 100% (24 Apr 2019 06:32) (99% - 100%)  CAPILLARY BLOOD GLUCOSE      POCT Blood Glucose.: 91 mg/dL (24 Apr 2019 06:47)  POCT Blood Glucose.: 89 mg/dL (24 Apr 2019 00:50)  POCT Blood Glucose.: 138 mg/dL (23 Apr 2019 22:36)  POCT Blood Glucose.: 141 mg/dL (23 Apr 2019 17:19)  POCT Blood Glucose.: 118 mg/dL (23 Apr 2019 11:41)        GA: NAD, A+0 x 3  CV: RRR  Pulm: mild wheezes in RUL  Breasts: soft, nontender, no palpable masses  Abd: +hypoactive BS, soft, nontender, nondistended, no rebound or guarding, uterus firm at midline, at the umbilicus  Incision: well approximated, no erythema or drainage  : lochia WNL  Extremities: no swelling or calf tenderness    I&O's Summary    23 Apr 2019 07:01  -  24 Apr 2019 07:00  --------------------------------------------------------  IN: 350 mL / OUT: 0 mL / NET: 350 mL                          5.6    12.80 )-----------( 373      ( 23 Apr 2019 11:31 )             18.2   04-23    136  |  98  |  5<L>  ----------------------------<  97  3.5   |  25  |  0.71    Ca    7.5<L>      23 Apr 2019 11:31  Phos  2.3     04-23  Mg     2.7     04-23    TPro  5.9<L>  /  Alb  2.4<L>  /  TBili  0.3  /  DBili  x   /  AST  32  /  ALT  13  /  AlkPhos  102  04-23

## 2019-04-24 NOTE — PROGRESS NOTE ADULT - ASSESSMENT
39F PMH of DM, HTN, asthma, multiple LEEP surgeries, hernia s/p mesh placement requiring 3 additional surgeries, R tubal ligation, and KATH ligation c/b PPH and superimposed preeclampsia.  GI was consulted for constipation    #Constipation- likely post op ileus, exacerbated by narcotics and bed-rest  -abdomen is soft  - abd xray- nonobstructive gas pattern  -Encourage ambulation  -Recommend taper of opioids Patient is in agreement.  -Recommend bowel regimen including miralax 17 gm BID  - ensure electrolytes are WNL  -zofran prn    #Acute anemia, possible post op blood loss  -pt has no signs of active GIB  -given recent surgery, consider CT abdomen/pelvis to further evaluate. Defer to primary team  -notify GI if signs of GI Blood loss  -follow up post transfusion cbc 39F PMH of DM, HTN, asthma, multiple LEEP surgeries, hernia s/p mesh placement requiring 3 additional surgeries, R tubal ligation, and KATH ligation c/b PPH and superimposed preeclampsia.  GI was consulted for constipation    #Constipation- likely post op ileus, exacerbated by narcotics and bed-rest  -abdomen is soft  - abd xray- nonobstructive gas pattern  -Encourage ambulation  -Recommend taper of opioids Patient is in agreement.  -Recommend bowel regimen including miralax 17 gm BID  - ensure electrolytes are WNL  -zofran prn    #Acute anemia, possible post op blood loss  -pt has no signs of active GIB, responded appropriately to prbcs  - Defer to primary team for workup  -notify GI if signs of GI Blood loss 39F PMH of DM, HTN, asthma, multiple LEEP surgeries, hernia s/p mesh placement requiring 3 additional surgeries, R tubal ligation, and KATH ligation c/b PPH and superimposed preeclampsia.  GI was consulted for constipation    #Constipation- likely post op ileus, exacerbated by narcotics and bed-rest  -abdomen is soft  - abd xray- nonobstructive gas pattern  -Encourage ambulation  -Recommend taper of opioids Patient is in agreement.  -Recommend bowel regimen including miralax 17 gm BID  - ensure electrolytes are WNL  -zofran prn    #Acute anemia, possible post op blood loss  -pt has no signs of active GIB, responded appropriately to prbcs  - Defer to primary team for workup  -notify GI if signs of GI Blood loss    Notify GI if any questions or concerns

## 2019-04-24 NOTE — PROGRESS NOTE ADULT - SUBJECTIVE AND OBJECTIVE BOX
Pt seen and examined at bedside.    PERTINENT REVIEW OF SYSTEMS:  CONSTITUTIONAL: No weakness, fevers or chills  HEENT: No visual changes; No vertigo or throat pain   GASTROINTESTINAL: No abdominal or epigastric pain. No nausea, vomiting, or hematemesis; No diarrhea or constipation. No melena or hematochezia.  NEUROLOGICAL: No numbness or weakness  SKIN: No itching, burning, rashes, or lesions     Allergies    amoxicillin (Unknown)  iodine containing compounds (Hives; Anaphylaxis)  penicillin (Hives)  shellfish. (Hives; Anaphylaxis)    Intolerances      MEDICATIONS:  MEDICATIONS  (STANDING):  dextrose 5%. 1000 milliLiter(s) (50 mL/Hr) IV Continuous <Continuous>  dextrose 50% Injectable 12.5 Gram(s) IV Push once  dextrose 50% Injectable 25 Gram(s) IV Push once  dextrose 50% Injectable 25 Gram(s) IV Push once  diphtheria/tetanus/pertussis (acellular) Vaccine (ADAcel) 0.5 milliLiter(s) IntraMuscular once  famotidine    Tablet 20 milliGRAM(s) Oral two times a day  ferrous    sulfate 325 milliGRAM(s) Oral daily  heparin  Injectable 7500 Unit(s) SubCutaneous every 8 hours  ibuprofen  Tablet. 600 milliGRAM(s) Oral every 6 hours  insulin lispro (HumaLOG) corrective regimen sliding scale   SubCutaneous Before meals and at bedtime  labetalol 200 milliGRAM(s) Oral every 12 hours  lactated ringers. 1000 milliLiter(s) (125 mL/Hr) IV Continuous <Continuous>  oxytocin Infusion 333.333 milliUNIT(s)/Min (1000 mL/Hr) IV Continuous <Continuous>  oxytocin Infusion 41.667 milliUNIT(s)/Min (125 mL/Hr) IV Continuous <Continuous>  oxytocin Infusion 41.667 milliUNIT(s)/Min (125 mL/Hr) IV Continuous <Continuous>  polyethylene glycol 3350 17 Gram(s) Oral daily  prenatal multivitamin 1 Tablet(s) Oral daily    MEDICATIONS  (PRN):  acetaminophen   Tablet .. 650 milliGRAM(s) Oral every 6 hours PRN Temp greater or equal to 38C (100.4F), Mild Pain (1 - 3)  ALBUTerol    90 MICROgram(s) HFA Inhaler 1 Puff(s) Inhalation every 4 hours PRN Shortness of Breath and/or Wheezing  ALBUTerol/ipratropium for Nebulization 3 milliLiter(s) Nebulizer every 6 hours PRN Shortness of Breath and/or Wheezing  aluminum hydroxide/magnesium hydroxide/simethicone Suspension 30 milliLiter(s) Oral every 4 hours PRN Dyspepsia  bisacodyl 5 milliGRAM(s) Oral every 12 hours PRN Constipation  calcium carbonate    500 mG (Tums) Chewable 1 Tablet(s) Chew four times a day PRN Indigestion  dextrose 40% Gel 15 Gram(s) Oral once PRN Blood Glucose LESS THAN 70 milliGRAM(s)/deciliter  diphenhydrAMINE 25 milliGRAM(s) Oral every 6 hours PRN Itching  docusate sodium 100 milliGRAM(s) Oral two times a day PRN Stool Softening  glucagon  Injectable 1 milliGRAM(s) IntraMuscular once PRN Glucose LESS THAN 70 milligrams/deciliter  glycerin Suppository - Adult 1 Suppository(s) Rectal at bedtime PRN Constipation  lanolin Ointment 1 Application(s) Topical every 3 hours PRN Sore Nipples  ondansetron    Tablet 4 milliGRAM(s) Oral every 6 hours PRN Nausea and/or Vomiting  oxyCODONE    5 mG/acetaminophen 325 mG 1 Tablet(s) Oral every 3 hours PRN Moderate Pain (4 - 6)  oxyCODONE    5 mG/acetaminophen 325 mG 2 Tablet(s) Oral every 6 hours PRN Severe Pain (7 - 10)  saline laxative (FLEET) Rectal Enema 1 Enema Rectal once PRN if no BM after Dulcolax  simethicone 80 milliGRAM(s) Chew every 4 hours PRN Gas  tiotropium 18 MICROgram(s) Capsule 1 Capsule(s) Inhalation daily PRN wheezing    Vital Signs Last 24 Hrs  T(C): 36.6 (24 Apr 2019 06:32), Max: 37.4 (23 Apr 2019 14:00)  T(F): 97.8 (24 Apr 2019 06:32), Max: 99.3 (23 Apr 2019 14:00)  HR: 83 (24 Apr 2019 06:32) (82 - 89)  BP: 132/81 (24 Apr 2019 06:32) (105/68 - 135/84)  BP(mean): --  RR: 17 (24 Apr 2019 06:32) (16 - 17)  SpO2: 100% (24 Apr 2019 06:32) (99% - 100%)    04-23 @ 07:01  -  04-24 @ 07:00  --------------------------------------------------------  IN: 350 mL / OUT: 0 mL / NET: 350 mL      PHYSICAL EXAM:    General: Well developed; well nourished; in no acute distress  HEENT: MMM, conjunctiva and sclera clear  Gastrointestinal: Soft non-tender non-distended; Normal bowel sounds; No hepatosplenomegaly. No rebound or guarding, stables at surgical site c/d/i  Skin: Warm and dry. No obvious rash    LABS:                        5.6    12.80 )-----------( 373      ( 23 Apr 2019 11:31 )             18.2     04-23    136  |  98  |  5<L>  ----------------------------<  97  3.5   |  25  |  0.71    Ca    7.5<L>      23 Apr 2019 11:31  Phos  2.3     04-23  Mg     2.7     04-23    TPro  5.9<L>  /  Alb  2.4<L>  /  TBili  0.3  /  DBili  x   /  AST  32  /  ALT  13  /  AlkPhos  102  04-23                      RADIOLOGY & ADDITIONAL STUDIES: Pt seen and examined at bedside. Pt denies abd pain. Has not yet had BM. Some nausea, but no vomiting    PERTINENT REVIEW OF SYSTEMS:  CONSTITUTIONAL: No weakness, fevers or chills  HEENT: No visual changes; No vertigo or throat pain   GASTROINTESTINAL: No abdominal or epigastric pain. No nausea, vomiting, or hematemesis; No diarrhea or constipation. No melena or hematochezia.  NEUROLOGICAL: No numbness or weakness  SKIN: No itching, burning, rashes, or lesions     Allergies    amoxicillin (Unknown)  iodine containing compounds (Hives; Anaphylaxis)  penicillin (Hives)  shellfish. (Hives; Anaphylaxis)    Intolerances      MEDICATIONS:  MEDICATIONS  (STANDING):  dextrose 5%. 1000 milliLiter(s) (50 mL/Hr) IV Continuous <Continuous>  dextrose 50% Injectable 12.5 Gram(s) IV Push once  dextrose 50% Injectable 25 Gram(s) IV Push once  dextrose 50% Injectable 25 Gram(s) IV Push once  diphtheria/tetanus/pertussis (acellular) Vaccine (ADAcel) 0.5 milliLiter(s) IntraMuscular once  famotidine    Tablet 20 milliGRAM(s) Oral two times a day  ferrous    sulfate 325 milliGRAM(s) Oral daily  heparin  Injectable 7500 Unit(s) SubCutaneous every 8 hours  ibuprofen  Tablet. 600 milliGRAM(s) Oral every 6 hours  insulin lispro (HumaLOG) corrective regimen sliding scale   SubCutaneous Before meals and at bedtime  labetalol 200 milliGRAM(s) Oral every 12 hours  lactated ringers. 1000 milliLiter(s) (125 mL/Hr) IV Continuous <Continuous>  oxytocin Infusion 333.333 milliUNIT(s)/Min (1000 mL/Hr) IV Continuous <Continuous>  oxytocin Infusion 41.667 milliUNIT(s)/Min (125 mL/Hr) IV Continuous <Continuous>  oxytocin Infusion 41.667 milliUNIT(s)/Min (125 mL/Hr) IV Continuous <Continuous>  polyethylene glycol 3350 17 Gram(s) Oral daily  prenatal multivitamin 1 Tablet(s) Oral daily    MEDICATIONS  (PRN):  acetaminophen   Tablet .. 650 milliGRAM(s) Oral every 6 hours PRN Temp greater or equal to 38C (100.4F), Mild Pain (1 - 3)  ALBUTerol    90 MICROgram(s) HFA Inhaler 1 Puff(s) Inhalation every 4 hours PRN Shortness of Breath and/or Wheezing  ALBUTerol/ipratropium for Nebulization 3 milliLiter(s) Nebulizer every 6 hours PRN Shortness of Breath and/or Wheezing  aluminum hydroxide/magnesium hydroxide/simethicone Suspension 30 milliLiter(s) Oral every 4 hours PRN Dyspepsia  bisacodyl 5 milliGRAM(s) Oral every 12 hours PRN Constipation  calcium carbonate    500 mG (Tums) Chewable 1 Tablet(s) Chew four times a day PRN Indigestion  dextrose 40% Gel 15 Gram(s) Oral once PRN Blood Glucose LESS THAN 70 milliGRAM(s)/deciliter  diphenhydrAMINE 25 milliGRAM(s) Oral every 6 hours PRN Itching  docusate sodium 100 milliGRAM(s) Oral two times a day PRN Stool Softening  glucagon  Injectable 1 milliGRAM(s) IntraMuscular once PRN Glucose LESS THAN 70 milligrams/deciliter  glycerin Suppository - Adult 1 Suppository(s) Rectal at bedtime PRN Constipation  lanolin Ointment 1 Application(s) Topical every 3 hours PRN Sore Nipples  ondansetron    Tablet 4 milliGRAM(s) Oral every 6 hours PRN Nausea and/or Vomiting  oxyCODONE    5 mG/acetaminophen 325 mG 1 Tablet(s) Oral every 3 hours PRN Moderate Pain (4 - 6)  oxyCODONE    5 mG/acetaminophen 325 mG 2 Tablet(s) Oral every 6 hours PRN Severe Pain (7 - 10)  saline laxative (FLEET) Rectal Enema 1 Enema Rectal once PRN if no BM after Dulcolax  simethicone 80 milliGRAM(s) Chew every 4 hours PRN Gas  tiotropium 18 MICROgram(s) Capsule 1 Capsule(s) Inhalation daily PRN wheezing    Vital Signs Last 24 Hrs  T(C): 36.6 (24 Apr 2019 06:32), Max: 37.4 (23 Apr 2019 14:00)  T(F): 97.8 (24 Apr 2019 06:32), Max: 99.3 (23 Apr 2019 14:00)  HR: 83 (24 Apr 2019 06:32) (82 - 89)  BP: 132/81 (24 Apr 2019 06:32) (105/68 - 135/84)  BP(mean): --  RR: 17 (24 Apr 2019 06:32) (16 - 17)  SpO2: 100% (24 Apr 2019 06:32) (99% - 100%)    04-23 @ 07:01  -  04-24 @ 07:00  --------------------------------------------------------  IN: 350 mL / OUT: 0 mL / NET: 350 mL      PHYSICAL EXAM:    General: Well developed; well nourished; in no acute distress  HEENT: MMM, conjunctiva and sclera clear  Gastrointestinal: Soft non-tender non-distended; Normal bowel sounds; No hepatosplenomegaly. No rebound or guarding, stables at surgical site c/d/i  Skin: Warm and dry. No obvious rash    LABS:                        5.6    12.80 )-----------( 373      ( 23 Apr 2019 11:31 )             18.2     04-23    136  |  98  |  5<L>  ----------------------------<  97  3.5   |  25  |  0.71    Ca    7.5<L>      23 Apr 2019 11:31  Phos  2.3     04-23  Mg     2.7     04-23    TPro  5.9<L>  /  Alb  2.4<L>  /  TBili  0.3  /  DBili  x   /  AST  32  /  ALT  13  /  AlkPhos  102  04-23                      RADIOLOGY & ADDITIONAL STUDIES:

## 2019-04-24 NOTE — PROGRESS NOTE ADULT - ASSESSMENT
37 yo F POD4 s/p C/S#3, R tubal ligation, and KATH ligation c/b PPH and superimposed preeclampsia on IV Magnesium.  -superimposed PEC: s/p IV Mg, labs stable, BP well controlled on home regimen, repeat labs this AM  -PPH: hgb 5.6 yesterday, s/p 2 u pRBC. If no appropriate raise in hgb, consider CT scan to evaluate for intraabdominal bleeding  -T2DM: Patient still not tolerating reg diet, continue ISS until then. Home regimen of insulin dc'ed according to endo recs  -GI: Diabetic diet as tolerated, simethicone, dulcolax, Miralax BID, Pepcid, Maalox, Tums, and Zofran. At risk for ileus due to extensive surgical history, DM  -: voiding  -Pulm: albuterol prn, wheezing on exam will give duonebs, incentive spirometer  -DVT ppx: SQH
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
## 2019-04-24 NOTE — PROVIDER CONTACT NOTE (OTHER) - ASSESSMENT
Dr. Aguilar came to see patient, pt. was in the bathroom at time and didn't come out to speak to him.

## 2019-04-25 ENCOUNTER — TRANSCRIPTION ENCOUNTER (OUTPATIENT)
Age: 39
End: 2019-04-25

## 2019-04-25 VITALS
TEMPERATURE: 99 F | SYSTOLIC BLOOD PRESSURE: 131 MMHG | RESPIRATION RATE: 19 BRPM | HEART RATE: 85 BPM | DIASTOLIC BLOOD PRESSURE: 81 MMHG | OXYGEN SATURATION: 100 %

## 2019-04-25 LAB
GLUCOSE BLDC GLUCOMTR-MCNC: 129 MG/DL — HIGH (ref 70–99)
GLUCOSE BLDC GLUCOMTR-MCNC: 133 MG/DL — HIGH (ref 70–99)
GLUCOSE BLDC GLUCOMTR-MCNC: 99 MG/DL — SIGNIFICANT CHANGE UP (ref 70–99)

## 2019-04-25 RX ORDER — IBUPROFEN 200 MG
1 TABLET ORAL
Qty: 120 | Refills: 0 | OUTPATIENT
Start: 2019-04-25 | End: 2019-05-24

## 2019-04-25 RX ORDER — TETANUS TOXOID, REDUCED DIPHTHERIA TOXOID AND ACELLULAR PERTUSSIS VACCINE, ADSORBED 5; 2.5; 8; 8; 2.5 [IU]/.5ML; [IU]/.5ML; UG/.5ML; UG/.5ML; UG/.5ML
0.5 SUSPENSION INTRAMUSCULAR ONCE
Qty: 0 | Refills: 0 | Status: COMPLETED | OUTPATIENT
Start: 2019-04-25 | End: 2019-04-25

## 2019-04-25 RX ORDER — GLYBURIDE 5 MG
1 TABLET ORAL
Qty: 30 | Refills: 0 | OUTPATIENT
Start: 2019-04-25 | End: 2019-05-24

## 2019-04-25 RX ADMIN — Medication 600 MILLIGRAM(S): at 13:40

## 2019-04-25 RX ADMIN — ONDANSETRON 4 MILLIGRAM(S): 8 TABLET, FILM COATED ORAL at 11:11

## 2019-04-25 RX ADMIN — Medication 600 MILLIGRAM(S): at 19:15

## 2019-04-25 RX ADMIN — FAMOTIDINE 20 MILLIGRAM(S): 10 INJECTION INTRAVENOUS at 12:39

## 2019-04-25 RX ADMIN — TETANUS TOXOID, REDUCED DIPHTHERIA TOXOID AND ACELLULAR PERTUSSIS VACCINE, ADSORBED 0.5 MILLILITER(S): 5; 2.5; 8; 8; 2.5 SUSPENSION INTRAMUSCULAR at 12:41

## 2019-04-25 RX ADMIN — FAMOTIDINE 20 MILLIGRAM(S): 10 INJECTION INTRAVENOUS at 00:15

## 2019-04-25 RX ADMIN — Medication 600 MILLIGRAM(S): at 18:27

## 2019-04-25 RX ADMIN — Medication 200 MILLIGRAM(S): at 03:24

## 2019-04-25 RX ADMIN — OXYCODONE AND ACETAMINOPHEN 1 TABLET(S): 5; 325 TABLET ORAL at 14:45

## 2019-04-25 RX ADMIN — ONDANSETRON 4 MILLIGRAM(S): 8 TABLET, FILM COATED ORAL at 16:46

## 2019-04-25 RX ADMIN — Medication 1 TABLET(S): at 12:39

## 2019-04-25 RX ADMIN — Medication 200 MILLIGRAM(S): at 14:43

## 2019-04-25 RX ADMIN — Medication 600 MILLIGRAM(S): at 12:41

## 2019-04-25 RX ADMIN — OXYCODONE AND ACETAMINOPHEN 1 TABLET(S): 5; 325 TABLET ORAL at 13:49

## 2019-04-25 RX ADMIN — Medication 600 MILLIGRAM(S): at 07:16

## 2019-04-25 RX ADMIN — OXYCODONE AND ACETAMINOPHEN 1 TABLET(S): 5; 325 TABLET ORAL at 01:18

## 2019-04-25 RX ADMIN — HEPARIN SODIUM 7500 UNIT(S): 5000 INJECTION INTRAVENOUS; SUBCUTANEOUS at 16:47

## 2019-04-25 RX ADMIN — Medication 100 MILLIGRAM(S): at 12:39

## 2019-04-25 RX ADMIN — HEPARIN SODIUM 7500 UNIT(S): 5000 INJECTION INTRAVENOUS; SUBCUTANEOUS at 10:30

## 2019-04-25 RX ADMIN — Medication 325 MILLIGRAM(S): at 12:39

## 2019-04-25 RX ADMIN — OXYCODONE AND ACETAMINOPHEN 1 TABLET(S): 5; 325 TABLET ORAL at 01:50

## 2019-04-25 NOTE — PROGRESS NOTE ADULT - SUBJECTIVE AND OBJECTIVE BOX
Pt seen and examined at bedside.    PERTINENT REVIEW OF SYSTEMS:  CONSTITUTIONAL: No weakness, fevers or chills  HEENT: No visual changes; No vertigo or throat pain   GASTROINTESTINAL: No abdominal or epigastric pain. No nausea, vomiting, or hematemesis; No diarrhea or constipation. No melena or hematochezia.  NEUROLOGICAL: No numbness or weakness  SKIN: No itching, burning, rashes, or lesions     Allergies    amoxicillin (Unknown)  iodine containing compounds (Hives; Anaphylaxis)  penicillin (Hives)  shellfish. (Hives; Anaphylaxis)    Intolerances      MEDICATIONS:  MEDICATIONS  (STANDING):  dextrose 5%. 1000 milliLiter(s) (50 mL/Hr) IV Continuous <Continuous>  dextrose 50% Injectable 12.5 Gram(s) IV Push once  dextrose 50% Injectable 25 Gram(s) IV Push once  dextrose 50% Injectable 25 Gram(s) IV Push once  diphtheria/tetanus/pertussis (acellular) Vaccine (ADAcel) 0.5 milliLiter(s) IntraMuscular once  famotidine    Tablet 20 milliGRAM(s) Oral two times a day  ferrous    sulfate 325 milliGRAM(s) Oral daily  heparin  Injectable 7500 Unit(s) SubCutaneous every 8 hours  ibuprofen  Tablet. 600 milliGRAM(s) Oral every 6 hours  insulin lispro (HumaLOG) corrective regimen sliding scale   SubCutaneous Before meals and at bedtime  labetalol 200 milliGRAM(s) Oral every 12 hours  lactated ringers. 1000 milliLiter(s) (125 mL/Hr) IV Continuous <Continuous>  ondansetron Injectable 4 milliGRAM(s) IV Push once  oxytocin Infusion 333.333 milliUNIT(s)/Min (1000 mL/Hr) IV Continuous <Continuous>  oxytocin Infusion 41.667 milliUNIT(s)/Min (125 mL/Hr) IV Continuous <Continuous>  oxytocin Infusion 41.667 milliUNIT(s)/Min (125 mL/Hr) IV Continuous <Continuous>  polyethylene glycol 3350 17 Gram(s) Oral two times a day  prenatal multivitamin 1 Tablet(s) Oral daily  zinc oxide 20% Ointment 1 Application(s) Topical daily    MEDICATIONS  (PRN):  acetaminophen   Tablet .. 650 milliGRAM(s) Oral every 6 hours PRN Temp greater or equal to 38C (100.4F), Mild Pain (1 - 3)  ALBUTerol    90 MICROgram(s) HFA Inhaler 1 Puff(s) Inhalation every 4 hours PRN Shortness of Breath and/or Wheezing  ALBUTerol/ipratropium for Nebulization 3 milliLiter(s) Nebulizer every 6 hours PRN Shortness of Breath and/or Wheezing  aluminum hydroxide/magnesium hydroxide/simethicone Suspension 30 milliLiter(s) Oral every 4 hours PRN Dyspepsia  bisacodyl 5 milliGRAM(s) Oral every 12 hours PRN Constipation  calcium carbonate    500 mG (Tums) Chewable 1 Tablet(s) Chew four times a day PRN Indigestion  dextrose 40% Gel 15 Gram(s) Oral once PRN Blood Glucose LESS THAN 70 milliGRAM(s)/deciliter  diphenhydrAMINE 25 milliGRAM(s) Oral every 6 hours PRN Itching  docusate sodium 100 milliGRAM(s) Oral two times a day PRN Stool Softening  glucagon  Injectable 1 milliGRAM(s) IntraMuscular once PRN Glucose LESS THAN 70 milligrams/deciliter  glycerin Suppository - Adult 1 Suppository(s) Rectal at bedtime PRN Constipation  lanolin Ointment 1 Application(s) Topical every 3 hours PRN Sore Nipples  ondansetron    Tablet 4 milliGRAM(s) Oral every 6 hours PRN Nausea and/or Vomiting  oxyCODONE    5 mG/acetaminophen 325 mG 1 Tablet(s) Oral every 3 hours PRN Moderate Pain (4 - 6)  oxyCODONE    5 mG/acetaminophen 325 mG 2 Tablet(s) Oral every 6 hours PRN Severe Pain (7 - 10)  saline laxative (FLEET) Rectal Enema 1 Enema Rectal once PRN if no BM after Dulcolax  simethicone 80 milliGRAM(s) Chew every 4 hours PRN Gas  tiotropium 18 MICROgram(s) Capsule 1 Capsule(s) Inhalation daily PRN wheezing    Vital Signs Last 24 Hrs  T(C): 36.9 (25 Apr 2019 02:00), Max: 36.9 (25 Apr 2019 02:00)  T(F): 98.5 (25 Apr 2019 02:00), Max: 98.5 (25 Apr 2019 02:00)  HR: 82 (25 Apr 2019 02:00) (82 - 93)  BP: 131/78 (25 Apr 2019 02:00) (126/79 - 147/85)  BP(mean): --  RR: 18 (25 Apr 2019 02:00) (16 - 18)  SpO2: 99% (25 Apr 2019 02:00) (99% - 100%)    PHYSICAL EXAM:    General: Well developed; well nourished; in no acute distress  HEENT: MMM, conjunctiva and sclera clear  Gastrointestinal: Soft non-tender non-distended; Normal bowel sounds; No hepatosplenomegaly. No rebound or guarding, incision site c/d/i  Skin: Warm and dry. No obvious rash    LABS:                        7.5    10.89 )-----------( 387      ( 24 Apr 2019 07:10 )             23.7     04-24    138  |  103  |  6<L>  ----------------------------<  73  3.7   |  24  |  0.69    Ca    8.0<L>      24 Apr 2019 07:10  Phos  2.8     04-24  Mg     2.4     04-24    TPro  6.2  /  Alb  2.4<L>  /  TBili  0.6  /  DBili  x   /  AST  31  /  ALT  17  /  AlkPhos  104  04-24                      RADIOLOGY & ADDITIONAL STUDIES: Pt seen and examined at bedside.  Pt has had no BM yet but is beginning to pass gas.  Pt states abdominal pain is unchanged around the surgical site and is resistant to the idea of weening off from the percocet in the near future.  Pt states that she is ambulating.  Patient is still nauseated and vomited 3-4 times in past 24 hours per pt, despite Zofran and Reglan.    PERTINENT REVIEW OF SYSTEMS:  CONSTITUTIONAL: No weakness, fevers or chills  HEENT: No visual changes; No vertigo or throat pain   GASTROINTESTINAL: Has generalized abdominal pain mainly around area of surgical site. Has Nausea and vomited 3-4 times in past 24 hours. Patient's last BM was 4/20/2019.  NEUROLOGICAL: No numbness or weakness  SKIN: No itching, burning, rashes, or lesions     Allergies    amoxicillin (Unknown)  iodine containing compounds (Hives; Anaphylaxis)  penicillin (Hives)  shellfish. (Hives; Anaphylaxis)    Intolerances      MEDICATIONS:  MEDICATIONS  (STANDING):  dextrose 5%. 1000 milliLiter(s) (50 mL/Hr) IV Continuous <Continuous>  dextrose 50% Injectable 12.5 Gram(s) IV Push once  dextrose 50% Injectable 25 Gram(s) IV Push once  dextrose 50% Injectable 25 Gram(s) IV Push once  diphtheria/tetanus/pertussis (acellular) Vaccine (ADAcel) 0.5 milliLiter(s) IntraMuscular once  famotidine    Tablet 20 milliGRAM(s) Oral two times a day  ferrous    sulfate 325 milliGRAM(s) Oral daily  heparin  Injectable 7500 Unit(s) SubCutaneous every 8 hours  ibuprofen  Tablet. 600 milliGRAM(s) Oral every 6 hours  insulin lispro (HumaLOG) corrective regimen sliding scale   SubCutaneous Before meals and at bedtime  labetalol 200 milliGRAM(s) Oral every 12 hours  lactated ringers. 1000 milliLiter(s) (125 mL/Hr) IV Continuous <Continuous>  ondansetron Injectable 4 milliGRAM(s) IV Push once  oxytocin Infusion 333.333 milliUNIT(s)/Min (1000 mL/Hr) IV Continuous <Continuous>  oxytocin Infusion 41.667 milliUNIT(s)/Min (125 mL/Hr) IV Continuous <Continuous>  oxytocin Infusion 41.667 milliUNIT(s)/Min (125 mL/Hr) IV Continuous <Continuous>  polyethylene glycol 3350 17 Gram(s) Oral two times a day  prenatal multivitamin 1 Tablet(s) Oral daily  zinc oxide 20% Ointment 1 Application(s) Topical daily    MEDICATIONS  (PRN):  acetaminophen   Tablet .. 650 milliGRAM(s) Oral every 6 hours PRN Temp greater or equal to 38C (100.4F), Mild Pain (1 - 3)  ALBUTerol    90 MICROgram(s) HFA Inhaler 1 Puff(s) Inhalation every 4 hours PRN Shortness of Breath and/or Wheezing  ALBUTerol/ipratropium for Nebulization 3 milliLiter(s) Nebulizer every 6 hours PRN Shortness of Breath and/or Wheezing  aluminum hydroxide/magnesium hydroxide/simethicone Suspension 30 milliLiter(s) Oral every 4 hours PRN Dyspepsia  bisacodyl 5 milliGRAM(s) Oral every 12 hours PRN Constipation  calcium carbonate    500 mG (Tums) Chewable 1 Tablet(s) Chew four times a day PRN Indigestion  dextrose 40% Gel 15 Gram(s) Oral once PRN Blood Glucose LESS THAN 70 milliGRAM(s)/deciliter  diphenhydrAMINE 25 milliGRAM(s) Oral every 6 hours PRN Itching  docusate sodium 100 milliGRAM(s) Oral two times a day PRN Stool Softening  glucagon  Injectable 1 milliGRAM(s) IntraMuscular once PRN Glucose LESS THAN 70 milligrams/deciliter  glycerin Suppository - Adult 1 Suppository(s) Rectal at bedtime PRN Constipation  lanolin Ointment 1 Application(s) Topical every 3 hours PRN Sore Nipples  ondansetron    Tablet 4 milliGRAM(s) Oral every 6 hours PRN Nausea and/or Vomiting  oxyCODONE    5 mG/acetaminophen 325 mG 1 Tablet(s) Oral every 3 hours PRN Moderate Pain (4 - 6)  oxyCODONE    5 mG/acetaminophen 325 mG 2 Tablet(s) Oral every 6 hours PRN Severe Pain (7 - 10)  saline laxative (FLEET) Rectal Enema 1 Enema Rectal once PRN if no BM after Dulcolax  simethicone 80 milliGRAM(s) Chew every 4 hours PRN Gas  tiotropium 18 MICROgram(s) Capsule 1 Capsule(s) Inhalation daily PRN wheezing    Vital Signs Last 24 Hrs  T(C): 36.9 (25 Apr 2019 02:00), Max: 36.9 (25 Apr 2019 02:00)  T(F): 98.5 (25 Apr 2019 02:00), Max: 98.5 (25 Apr 2019 02:00)  HR: 82 (25 Apr 2019 02:00) (82 - 93)  BP: 131/78 (25 Apr 2019 02:00) (126/79 - 147/85)  BP(mean): --  RR: 18 (25 Apr 2019 02:00) (16 - 18)  SpO2: 99% (25 Apr 2019 02:00) (99% - 100%)    PHYSICAL EXAM:    General: Well developed; well nourished; in no acute distress  HEENT: MMM, conjunctiva and sclera clear  Gastrointestinal: Soft, tender abdomen; Normoactive bowel sounds; non-distended; No hepatosplenomegaly. No rebound or guarding, incision site c/d/i  Skin: Warm and dry. No obvious rash.  Surgical staples in place along incision.  No unexpected swelling or erythema    LABS:                        7.5    10.89 )-----------( 387      ( 24 Apr 2019 07:10 )             23.7     04-24    138  |  103  |  6<L>  ----------------------------<  73  3.7   |  24  |  0.69    Ca    8.0<L>      24 Apr 2019 07:10  Phos  2.8     04-24  Mg     2.4     04-24    TPro  6.2  /  Alb  2.4<L>  /  TBili  0.6  /  DBili  x   /  AST  31  /  ALT  17  /  AlkPhos  104  04-24                      RADIOLOGY & ADDITIONAL STUDIES: Pt seen and examined at bedside.  Pt has had no BM yet but is beginning to pass gas.  Pt states abdominal pain is unchanged around the surgical site and is resistant to the idea of weening off from the percocet in the near future.  Pt states that she is ambulating.  Patient is still nauseated and vomited 3-4 times in past 24 hours per pt, despite Zofran and Reglan.    PERTINENT REVIEW OF SYSTEMS:  CONSTITUTIONAL: No weakness, fevers or chills  HEENT: No visual changes; No vertigo or throat pain   GASTROINTESTINAL: Has generalized abdominal pain mainly around area of surgical site. Has Nausea and vomited 3-4 times in past 24 hours. Patient's last BM was 4/20/2019.  NEUROLOGICAL: No numbness or weakness  SKIN: No itching, burning, rashes, or lesions     Allergies    amoxicillin (Unknown)  iodine containing compounds (Hives; Anaphylaxis)  penicillin (Hives)  shellfish. (Hives; Anaphylaxis)    Intolerances      MEDICATIONS:  MEDICATIONS  (STANDING):  dextrose 5%. 1000 milliLiter(s) (50 mL/Hr) IV Continuous <Continuous>  dextrose 50% Injectable 12.5 Gram(s) IV Push once  dextrose 50% Injectable 25 Gram(s) IV Push once  dextrose 50% Injectable 25 Gram(s) IV Push once  diphtheria/tetanus/pertussis (acellular) Vaccine (ADAcel) 0.5 milliLiter(s) IntraMuscular once  famotidine    Tablet 20 milliGRAM(s) Oral two times a day  ferrous    sulfate 325 milliGRAM(s) Oral daily  heparin  Injectable 7500 Unit(s) SubCutaneous every 8 hours  ibuprofen  Tablet. 600 milliGRAM(s) Oral every 6 hours  insulin lispro (HumaLOG) corrective regimen sliding scale   SubCutaneous Before meals and at bedtime  labetalol 200 milliGRAM(s) Oral every 12 hours  lactated ringers. 1000 milliLiter(s) (125 mL/Hr) IV Continuous <Continuous>  ondansetron Injectable 4 milliGRAM(s) IV Push once  oxytocin Infusion 333.333 milliUNIT(s)/Min (1000 mL/Hr) IV Continuous <Continuous>  oxytocin Infusion 41.667 milliUNIT(s)/Min (125 mL/Hr) IV Continuous <Continuous>  oxytocin Infusion 41.667 milliUNIT(s)/Min (125 mL/Hr) IV Continuous <Continuous>  polyethylene glycol 3350 17 Gram(s) Oral two times a day  prenatal multivitamin 1 Tablet(s) Oral daily  zinc oxide 20% Ointment 1 Application(s) Topical daily    MEDICATIONS  (PRN):  acetaminophen   Tablet .. 650 milliGRAM(s) Oral every 6 hours PRN Temp greater or equal to 38C (100.4F), Mild Pain (1 - 3)  ALBUTerol    90 MICROgram(s) HFA Inhaler 1 Puff(s) Inhalation every 4 hours PRN Shortness of Breath and/or Wheezing  ALBUTerol/ipratropium for Nebulization 3 milliLiter(s) Nebulizer every 6 hours PRN Shortness of Breath and/or Wheezing  aluminum hydroxide/magnesium hydroxide/simethicone Suspension 30 milliLiter(s) Oral every 4 hours PRN Dyspepsia  bisacodyl 5 milliGRAM(s) Oral every 12 hours PRN Constipation  calcium carbonate    500 mG (Tums) Chewable 1 Tablet(s) Chew four times a day PRN Indigestion  dextrose 40% Gel 15 Gram(s) Oral once PRN Blood Glucose LESS THAN 70 milliGRAM(s)/deciliter  diphenhydrAMINE 25 milliGRAM(s) Oral every 6 hours PRN Itching  docusate sodium 100 milliGRAM(s) Oral two times a day PRN Stool Softening  glucagon  Injectable 1 milliGRAM(s) IntraMuscular once PRN Glucose LESS THAN 70 milligrams/deciliter  glycerin Suppository - Adult 1 Suppository(s) Rectal at bedtime PRN Constipation  lanolin Ointment 1 Application(s) Topical every 3 hours PRN Sore Nipples  ondansetron    Tablet 4 milliGRAM(s) Oral every 6 hours PRN Nausea and/or Vomiting  oxyCODONE    5 mG/acetaminophen 325 mG 1 Tablet(s) Oral every 3 hours PRN Moderate Pain (4 - 6)  oxyCODONE    5 mG/acetaminophen 325 mG 2 Tablet(s) Oral every 6 hours PRN Severe Pain (7 - 10)  saline laxative (FLEET) Rectal Enema 1 Enema Rectal once PRN if no BM after Dulcolax  simethicone 80 milliGRAM(s) Chew every 4 hours PRN Gas  tiotropium 18 MICROgram(s) Capsule 1 Capsule(s) Inhalation daily PRN wheezing    Vital Signs Last 24 Hrs  T(C): 36.9 (25 Apr 2019 02:00), Max: 36.9 (25 Apr 2019 02:00)  T(F): 98.5 (25 Apr 2019 02:00), Max: 98.5 (25 Apr 2019 02:00)  HR: 82 (25 Apr 2019 02:00) (82 - 93)  BP: 131/78 (25 Apr 2019 02:00) (126/79 - 147/85)  BP(mean): --  RR: 18 (25 Apr 2019 02:00) (16 - 18)  SpO2: 99% (25 Apr 2019 02:00) (99% - 100%)    PHYSICAL EXAM:    General: Well developed; well nourished; in no acute distress  HEENT: MMM, conjunctiva and sclera clear  Gastrointestinal: Soft, tender abdomen; Normoactive bowel sounds; non-distended; No hepatosplenomegaly. No rebound or guarding, incision site c/d/i  Skin: Warm and dry. No obvious rash.  Surgical staples in place along incision.      LABS:                        7.5    10.89 )-----------( 387      ( 24 Apr 2019 07:10 )             23.7     04-24    138  |  103  |  6<L>  ----------------------------<  73  3.7   |  24  |  0.69    Ca    8.0<L>      24 Apr 2019 07:10  Phos  2.8     04-24  Mg     2.4     04-24    TPro  6.2  /  Alb  2.4<L>  /  TBili  0.6  /  DBili  x   /  AST  31  /  ALT  17  /  AlkPhos  104  04-24                      RADIOLOGY & ADDITIONAL STUDIES: Pt seen and examined at bedside.  Pt is passing gas and had a bowel movement this afternoon.  Pt states abdominal pain is unchanged around the surgical site and is resistant to the idea of weening off from the percocet in the near future.  Pt states that she is ambulating.  Patient is still nauseated and vomited 3-4 times in past 24 hours per pt, despite Zofran and Reglan.    PERTINENT REVIEW OF SYSTEMS:  CONSTITUTIONAL: No weakness, fevers or chills  HEENT: No visual changes; No vertigo or throat pain   GASTROINTESTINAL: Has generalized abdominal pain mainly around area of surgical site. Has Nausea and vomited 3-4 times in past 24 hours. Patient's last BM was 4/20/2019.  NEUROLOGICAL: No numbness or weakness  SKIN: No itching, burning, rashes, or lesions     Allergies    amoxicillin (Unknown)  iodine containing compounds (Hives; Anaphylaxis)  penicillin (Hives)  shellfish. (Hives; Anaphylaxis)    Intolerances      MEDICATIONS:  MEDICATIONS  (STANDING):  dextrose 5%. 1000 milliLiter(s) (50 mL/Hr) IV Continuous <Continuous>  dextrose 50% Injectable 12.5 Gram(s) IV Push once  dextrose 50% Injectable 25 Gram(s) IV Push once  dextrose 50% Injectable 25 Gram(s) IV Push once  diphtheria/tetanus/pertussis (acellular) Vaccine (ADAcel) 0.5 milliLiter(s) IntraMuscular once  famotidine    Tablet 20 milliGRAM(s) Oral two times a day  ferrous    sulfate 325 milliGRAM(s) Oral daily  heparin  Injectable 7500 Unit(s) SubCutaneous every 8 hours  ibuprofen  Tablet. 600 milliGRAM(s) Oral every 6 hours  insulin lispro (HumaLOG) corrective regimen sliding scale   SubCutaneous Before meals and at bedtime  labetalol 200 milliGRAM(s) Oral every 12 hours  lactated ringers. 1000 milliLiter(s) (125 mL/Hr) IV Continuous <Continuous>  ondansetron Injectable 4 milliGRAM(s) IV Push once  oxytocin Infusion 333.333 milliUNIT(s)/Min (1000 mL/Hr) IV Continuous <Continuous>  oxytocin Infusion 41.667 milliUNIT(s)/Min (125 mL/Hr) IV Continuous <Continuous>  oxytocin Infusion 41.667 milliUNIT(s)/Min (125 mL/Hr) IV Continuous <Continuous>  polyethylene glycol 3350 17 Gram(s) Oral two times a day  prenatal multivitamin 1 Tablet(s) Oral daily  zinc oxide 20% Ointment 1 Application(s) Topical daily    MEDICATIONS  (PRN):  acetaminophen   Tablet .. 650 milliGRAM(s) Oral every 6 hours PRN Temp greater or equal to 38C (100.4F), Mild Pain (1 - 3)  ALBUTerol    90 MICROgram(s) HFA Inhaler 1 Puff(s) Inhalation every 4 hours PRN Shortness of Breath and/or Wheezing  ALBUTerol/ipratropium for Nebulization 3 milliLiter(s) Nebulizer every 6 hours PRN Shortness of Breath and/or Wheezing  aluminum hydroxide/magnesium hydroxide/simethicone Suspension 30 milliLiter(s) Oral every 4 hours PRN Dyspepsia  bisacodyl 5 milliGRAM(s) Oral every 12 hours PRN Constipation  calcium carbonate    500 mG (Tums) Chewable 1 Tablet(s) Chew four times a day PRN Indigestion  dextrose 40% Gel 15 Gram(s) Oral once PRN Blood Glucose LESS THAN 70 milliGRAM(s)/deciliter  diphenhydrAMINE 25 milliGRAM(s) Oral every 6 hours PRN Itching  docusate sodium 100 milliGRAM(s) Oral two times a day PRN Stool Softening  glucagon  Injectable 1 milliGRAM(s) IntraMuscular once PRN Glucose LESS THAN 70 milligrams/deciliter  glycerin Suppository - Adult 1 Suppository(s) Rectal at bedtime PRN Constipation  lanolin Ointment 1 Application(s) Topical every 3 hours PRN Sore Nipples  ondansetron    Tablet 4 milliGRAM(s) Oral every 6 hours PRN Nausea and/or Vomiting  oxyCODONE    5 mG/acetaminophen 325 mG 1 Tablet(s) Oral every 3 hours PRN Moderate Pain (4 - 6)  oxyCODONE    5 mG/acetaminophen 325 mG 2 Tablet(s) Oral every 6 hours PRN Severe Pain (7 - 10)  saline laxative (FLEET) Rectal Enema 1 Enema Rectal once PRN if no BM after Dulcolax  simethicone 80 milliGRAM(s) Chew every 4 hours PRN Gas  tiotropium 18 MICROgram(s) Capsule 1 Capsule(s) Inhalation daily PRN wheezing    Vital Signs Last 24 Hrs  T(C): 36.9 (25 Apr 2019 02:00), Max: 36.9 (25 Apr 2019 02:00)  T(F): 98.5 (25 Apr 2019 02:00), Max: 98.5 (25 Apr 2019 02:00)  HR: 82 (25 Apr 2019 02:00) (82 - 93)  BP: 131/78 (25 Apr 2019 02:00) (126/79 - 147/85)  BP(mean): --  RR: 18 (25 Apr 2019 02:00) (16 - 18)  SpO2: 99% (25 Apr 2019 02:00) (99% - 100%)    PHYSICAL EXAM:    General: Well developed; well nourished; in no acute distress  HEENT: MMM, conjunctiva and sclera clear  Gastrointestinal: Soft, tender abdomen; Normoactive bowel sounds; non-distended; No hepatosplenomegaly. No rebound or guarding, incision site c/d/i  Skin: Warm and dry. No obvious rash.  Surgical staples in place along incision.      LABS:                        7.5    10.89 )-----------( 387      ( 24 Apr 2019 07:10 )             23.7     04-24    138  |  103  |  6<L>  ----------------------------<  73  3.7   |  24  |  0.69    Ca    8.0<L>      24 Apr 2019 07:10  Phos  2.8     04-24  Mg     2.4     04-24    TPro  6.2  /  Alb  2.4<L>  /  TBili  0.6  /  DBili  x   /  AST  31  /  ALT  17  /  AlkPhos  104  04-24                      RADIOLOGY & ADDITIONAL STUDIES:

## 2019-04-25 NOTE — DIETITIAN INITIAL EVALUATION ADULT. - NS AS NUTRI INTERV MEALS SNACK
Energy - modified diet/Mineral - modified diet/add DASH restriction diet/Carbohydrate - modified diet/General/healthful diet/Protein - modified diet

## 2019-04-25 NOTE — PROGRESS NOTE ADULT - SUBJECTIVE AND OBJECTIVE BOX
Patient evaluated at bedside.   She reports pain is well controlled. She reports leakage of foul smelling blood from her incision. Denies fevers/chills. Is tolerating a regular diet without vomiting and is passing flatus.   She denies headache, dizziness, chest pain, palpitations, shortness of breathe, nausea, vomiting or heavy vaginal bleeding.  She has been ambulating without assistance, voiding spontaneously, and is breastfeeding.      Vital Signs Last 24 Hrs  T(C): 36.9 (25 Apr 2019 02:00), Max: 36.9 (25 Apr 2019 02:00)  T(F): 98.5 (25 Apr 2019 02:00), Max: 98.5 (25 Apr 2019 02:00)  HR: 82 (25 Apr 2019 02:00) (82 - 93)  BP: 131/78 (25 Apr 2019 02:00) (126/79 - 147/85)  BP(mean): --  RR: 18 (25 Apr 2019 02:00) (16 - 18)  SpO2: 99% (25 Apr 2019 02:00) (99% - 100%)  CAPILLARY BLOOD GLUCOSE      POCT Blood Glucose.: 129 mg/dL (25 Apr 2019 07:22)  POCT Blood Glucose.: 130 mg/dL (24 Apr 2019 21:59)  POCT Blood Glucose.: 106 mg/dL (24 Apr 2019 15:57)  POCT Blood Glucose.: 177 mg/dL (24 Apr 2019 14:24)        GA: NAD, A+0 x 3  CV: RRR  Pulm: mild wheezes in RUL  Breasts: soft, nontender, no palpable masses  Abd: +hypoactive BS, soft, nontender, mildly distended, no rebound or guarding, uterus firm at midline, at the umbilicus  Incision: well approximated, serosanguinous drainage on an ABD, no erythema or tenderness or induration  : lochia WNL  Extremities: no swelling or calf tenderness                          7.5    10.89 )-----------( 387      ( 24 Apr 2019 07:10 )             23.7   04-24    138  |  103  |  6<L>  ----------------------------<  73  3.7   |  24  |  0.69    Ca    8.0<L>      24 Apr 2019 07:10  Phos  2.8     04-24  Mg     2.4     04-24    TPro  6.2  /  Alb  2.4<L>  /  TBili  0.6  /  DBili  x   /  AST  31  /  ALT  17  /  AlkPhos  104  04-24

## 2019-04-25 NOTE — PROGRESS NOTE ADULT - ATTENDING COMMENTS
Patient seen earlier in the day with complaints of N/V. Offered NG tube, declined and discussed plan for consult with GI and endocrine.   Patient became irritated with the plan for consult given that she has travis "vomiting for years and no one did anything" She says it due to her history of abdominal surgeries and hernia repair. I explained to patient that we were trying to take care of her a listen to concerns but she was dismissive.     Discussed plan for follow up lab work and was called by resident when it was done. Reviewed anemia and decision to offer patient blood transfusion given sudden decrease in hgb. Will follow closely for flatus, change in abdominal status and post op hgb.
Patient seen at the bedside with Dr. Francois,Endocrinology Resident. she has had a lot of nasuae and vomiting due to her multiple hernia procedures.  Will continue with sliding scale Insulin only.She could be discharged on 2.5 mg Glipizide.
Patient seen at the bedside with Dr. Francois,Endocrinology Fellow. Glucoses today have been 129-99. I agree with continuing sliding scale Insulin coverage only. she may go home on Glipizide 2.5 mg daily.
Avoid narcotics, encourage ambulation, standing bowel regimen as above.

## 2019-04-25 NOTE — DIETITIAN INITIAL EVALUATION ADULT. - ENERGY NEEDS
Ht:5ft 7inches,IBW:135lbs(pre-pregnancy IBW) [id number] 500kcal for breastfeedin-kcal and 0.8gmplus 25gmprotein/kg BW:74gmprotein and 35-40cc fluids:[id number] fluids

## 2019-04-25 NOTE — DISCHARGE NOTE OB - CARE PLAN
Principal Discharge DX:	Postpartum state  Goal:	safe discharge to home  Assessment and plan of treatment:	please followup with primary Ob on Monday 4/29 to remove staples, please return to hospital if there is any continued leaking of fluid from incision, redness or swelling  Secondary Diagnosis:	Diabetes  Assessment and plan of treatment:	please start taking glyburide 2.5mg every day by mouth in the morning for Type 2 diabetes  Please call [facility] by calling  to make an appointment as soon as possible  Secondary Diagnosis:	Pre-eclampsia in third trimester  Goal:	BP control  Assessment and plan of treatment:	Follow up within your OB in one week for a blood pressure check. Check bp 3x a day. If blood pressure greater than 160/110, you develop a headache not relieved by tylenol, visual disturbances, or right upper abdominal pain, call your doctor or the hospital, or go to your nearest emergency room. Regular diet, normal activity, nothing in the vagina for 6 weeks--no sex, tampons, tub baths, or swimming pools.   Please continue to take Labetalol 200mg twice a day and check your BP 3x/day

## 2019-04-25 NOTE — PROGRESS NOTE ADULT - SUBJECTIVE AND OBJECTIVE BOX
INTERVAL HPI/OVERNIGHT EVENTS:    Patient is a 39y old  Female who presents with a chief complaint of  (2019 07:14)      Pt reports the following symptoms:    CONSTITUTIONAL:  Negative fever or chills, feels well, good appetite  EYES:  Negative  blurry vision or double vision  CARDIOVASCULAR:  Negative for chest pain or palpitations  RESPIRATORY:  Negative for cough, wheezing, or SOB   GASTROINTESTINAL:  Negative for nausea, vomiting, diarrhea, constipation, or abdominal pain  GENITOURINARY:  Negative frequency, urgency or dysuria  NEUROLOGIC:  No headache, confusion, dizziness, lightheadedness    MEDICATIONS  (STANDING):  dextrose 5%. 1000 milliLiter(s) (50 mL/Hr) IV Continuous <Continuous>  dextrose 50% Injectable 12.5 Gram(s) IV Push once  dextrose 50% Injectable 25 Gram(s) IV Push once  dextrose 50% Injectable 25 Gram(s) IV Push once  diphtheria/tetanus/pertussis (acellular) Vaccine (ADAcel) 0.5 milliLiter(s) IntraMuscular once  famotidine    Tablet 20 milliGRAM(s) Oral two times a day  ferrous    sulfate 325 milliGRAM(s) Oral daily  heparin  Injectable 7500 Unit(s) SubCutaneous every 8 hours  ibuprofen  Tablet. 600 milliGRAM(s) Oral every 6 hours  insulin lispro (HumaLOG) corrective regimen sliding scale   SubCutaneous Before meals and at bedtime  labetalol 200 milliGRAM(s) Oral every 12 hours  lactated ringers. 1000 milliLiter(s) (125 mL/Hr) IV Continuous <Continuous>  ondansetron Injectable 4 milliGRAM(s) IV Push once  oxytocin Infusion 333.333 milliUNIT(s)/Min (1000 mL/Hr) IV Continuous <Continuous>  oxytocin Infusion 41.667 milliUNIT(s)/Min (125 mL/Hr) IV Continuous <Continuous>  oxytocin Infusion 41.667 milliUNIT(s)/Min (125 mL/Hr) IV Continuous <Continuous>  polyethylene glycol 3350 17 Gram(s) Oral two times a day  prenatal multivitamin 1 Tablet(s) Oral daily    MEDICATIONS  (PRN):  acetaminophen   Tablet .. 650 milliGRAM(s) Oral every 6 hours PRN Temp greater or equal to 38C (100.4F), Mild Pain (1 - 3)  ALBUTerol    90 MICROgram(s) HFA Inhaler 1 Puff(s) Inhalation every 4 hours PRN Shortness of Breath and/or Wheezing  ALBUTerol/ipratropium for Nebulization 3 milliLiter(s) Nebulizer every 6 hours PRN Shortness of Breath and/or Wheezing  aluminum hydroxide/magnesium hydroxide/simethicone Suspension 30 milliLiter(s) Oral every 4 hours PRN Dyspepsia  bisacodyl 5 milliGRAM(s) Oral every 12 hours PRN Constipation  calcium carbonate    500 mG (Tums) Chewable 1 Tablet(s) Chew four times a day PRN Indigestion  dextrose 40% Gel 15 Gram(s) Oral once PRN Blood Glucose LESS THAN 70 milliGRAM(s)/deciliter  diphenhydrAMINE 25 milliGRAM(s) Oral every 6 hours PRN Itching  docusate sodium 100 milliGRAM(s) Oral two times a day PRN Stool Softening  glucagon  Injectable 1 milliGRAM(s) IntraMuscular once PRN Glucose LESS THAN 70 milligrams/deciliter  glycerin Suppository - Adult 1 Suppository(s) Rectal at bedtime PRN Constipation  lanolin Ointment 1 Application(s) Topical every 3 hours PRN Sore Nipples  ondansetron    Tablet 4 milliGRAM(s) Oral every 6 hours PRN Nausea and/or Vomiting  oxyCODONE    5 mG/acetaminophen 325 mG 1 Tablet(s) Oral every 3 hours PRN Moderate Pain (4 - 6)  oxyCODONE    5 mG/acetaminophen 325 mG 2 Tablet(s) Oral every 6 hours PRN Severe Pain (7 - 10)  saline laxative (FLEET) Rectal Enema 1 Enema Rectal once PRN if no BM after Dulcolax  simethicone 80 milliGRAM(s) Chew every 4 hours PRN Gas  tiotropium 18 MICROgram(s) Capsule 1 Capsule(s) Inhalation daily PRN wheezing      PHYSICAL EXAM  Vital Signs Last 24 Hrs  T(C): 37 (2019 10:00), Max: 37 (2019 06:00)  T(F): 98.6 (2019 10:00), Max: 98.6 (2019 06:00)  HR: 82 (2019 10:00) (82 - 93)  BP: 148/87 (2019 10:00) (126/79 - 152/87)  BP(mean): --  RR: 20 (2019 10:00) (17 - 20)  SpO2: 100% (2019 10:00) (99% - 100%)    Constitutional: wn/wd in NAD.   HEENT: NCAT, MMM, OP clear, EOMI, no proptosis or lid retraction  Neck: no thyromegaly or palpable thyroid nodules   Respiratory: lungs CTAB.  Cardiovascular: regular rhythm, normal S1 and S2, no audible murmurs, no peripheral edema  GI: soft, NT/ND, no masses/HSM appreciated.  Neurology: no tremors, DTR 2+  Skin: no visible rashes/lesions  Psychiatric: AAO x 3, normal affect/mood.    LABS:                        7.5    10.89 )-----------( 387      ( 2019 07:10 )             23.7     24    138  |  103  |  6<L>  ----------------------------<  73  3.7   |  24  |  0.69    Ca    8.0<L>      2019 07:10  Phos  2.8       Mg     2.4         TPro  6.2  /  Alb  2.4<L>  /  TBili  0.6  /  DBili  x   /  AST  31  /  ALT  17  /  AlkPhos  104              HbA1C: 6.0 % ( @ 11:31)    CAPILLARY BLOOD GLUCOSE      POCT Blood Glucose.: 99 mg/dL (2019 11:55)  POCT Blood Glucose.: 129 mg/dL (2019 07:22)  POCT Blood Glucose.: 130 mg/dL (2019 21:59)  POCT Blood Glucose.: 106 mg/dL (2019 15:57)  POCT Blood Glucose.: 177 mg/dL (2019 14:24)      Insulin Sliding Scale requirements X 24 Hours:    RADIOLOGY & ADDITIONAL TESTS:    A/P: 39y Female with history of DM type II presenting for       1.  DM -     Please continue           units lantus at bedtime  / in the morning and        units lispro with meals and lispro moderate / low dose sliding scale 4 times daily with meals and at bedtime.  Please continue consistent carbohydrate diet.      Goal FSG is   Will continue to monitor   For discharge, pt can continue    Pt can follow up at discharge with [facility] by calling  to make an appointment.   Will discuss case with     and update primary team INTERVAL HPI/OVERNIGHT EVENTS: FAUSTINO    Pt still reporting pain and n/v. Otherwise denies fever, chills, chest pain, SOB    MEDICATIONS  (STANDING):  dextrose 5%. 1000 milliLiter(s) (50 mL/Hr) IV Continuous <Continuous>  dextrose 50% Injectable 12.5 Gram(s) IV Push once  dextrose 50% Injectable 25 Gram(s) IV Push once  dextrose 50% Injectable 25 Gram(s) IV Push once  diphtheria/tetanus/pertussis (acellular) Vaccine (ADAcel) 0.5 milliLiter(s) IntraMuscular once  famotidine    Tablet 20 milliGRAM(s) Oral two times a day  ferrous    sulfate 325 milliGRAM(s) Oral daily  heparin  Injectable 7500 Unit(s) SubCutaneous every 8 hours  ibuprofen  Tablet. 600 milliGRAM(s) Oral every 6 hours  insulin lispro (HumaLOG) corrective regimen sliding scale   SubCutaneous Before meals and at bedtime  labetalol 200 milliGRAM(s) Oral every 12 hours  lactated ringers. 1000 milliLiter(s) (125 mL/Hr) IV Continuous <Continuous>  ondansetron Injectable 4 milliGRAM(s) IV Push once  oxytocin Infusion 333.333 milliUNIT(s)/Min (1000 mL/Hr) IV Continuous <Continuous>  oxytocin Infusion 41.667 milliUNIT(s)/Min (125 mL/Hr) IV Continuous <Continuous>  oxytocin Infusion 41.667 milliUNIT(s)/Min (125 mL/Hr) IV Continuous <Continuous>  polyethylene glycol 3350 17 Gram(s) Oral two times a day  prenatal multivitamin 1 Tablet(s) Oral daily    MEDICATIONS  (PRN):  acetaminophen   Tablet .. 650 milliGRAM(s) Oral every 6 hours PRN Temp greater or equal to 38C (100.4F), Mild Pain (1 - 3)  ALBUTerol    90 MICROgram(s) HFA Inhaler 1 Puff(s) Inhalation every 4 hours PRN Shortness of Breath and/or Wheezing  ALBUTerol/ipratropium for Nebulization 3 milliLiter(s) Nebulizer every 6 hours PRN Shortness of Breath and/or Wheezing  aluminum hydroxide/magnesium hydroxide/simethicone Suspension 30 milliLiter(s) Oral every 4 hours PRN Dyspepsia  bisacodyl 5 milliGRAM(s) Oral every 12 hours PRN Constipation  calcium carbonate    500 mG (Tums) Chewable 1 Tablet(s) Chew four times a day PRN Indigestion  dextrose 40% Gel 15 Gram(s) Oral once PRN Blood Glucose LESS THAN 70 milliGRAM(s)/deciliter  diphenhydrAMINE 25 milliGRAM(s) Oral every 6 hours PRN Itching  docusate sodium 100 milliGRAM(s) Oral two times a day PRN Stool Softening  glucagon  Injectable 1 milliGRAM(s) IntraMuscular once PRN Glucose LESS THAN 70 milligrams/deciliter  glycerin Suppository - Adult 1 Suppository(s) Rectal at bedtime PRN Constipation  lanolin Ointment 1 Application(s) Topical every 3 hours PRN Sore Nipples  ondansetron    Tablet 4 milliGRAM(s) Oral every 6 hours PRN Nausea and/or Vomiting  oxyCODONE    5 mG/acetaminophen 325 mG 1 Tablet(s) Oral every 3 hours PRN Moderate Pain (4 - 6)  oxyCODONE    5 mG/acetaminophen 325 mG 2 Tablet(s) Oral every 6 hours PRN Severe Pain (7 - 10)  saline laxative (FLEET) Rectal Enema 1 Enema Rectal once PRN if no BM after Dulcolax  simethicone 80 milliGRAM(s) Chew every 4 hours PRN Gas  tiotropium 18 MICROgram(s) Capsule 1 Capsule(s) Inhalation daily PRN wheezing      PHYSICAL EXAM  Vital Signs Last 24 Hrs  T(C): 37 (25 Apr 2019 10:00), Max: 37 (25 Apr 2019 06:00)  T(F): 98.6 (25 Apr 2019 10:00), Max: 98.6 (25 Apr 2019 06:00)  HR: 82 (25 Apr 2019 10:00) (82 - 93)  BP: 148/87 (25 Apr 2019 10:00) (126/79 - 152/87)  BP(mean): --  RR: 20 (25 Apr 2019 10:00) (17 - 20)  SpO2: 100% (25 Apr 2019 10:00) (99% - 100%)    Constitutional: wn/wd in NAD.   HEENT: NCAT, MMM, OP clear, EOMI, no proptosis or lid retraction  Neck: no thyromegaly or palpable thyroid nodules   Respiratory: lungs CTAB.  Cardiovascular: regular rhythm, normal S1 and S2, no audible murmurs, no peripheral edema  GI: soft, NT/ND, no masses/HSM appreciated.  Neurology: no tremors, DTR 2+  Skin: no visible rashes/lesions  Psychiatric: AAO x 3, normal affect/mood.    LABS:                        7.5    10.89 )-----------( 387      ( 24 Apr 2019 07:10 )             23.7     04-24    138  |  103  |  6<L>  ----------------------------<  73  3.7   |  24  |  0.69    Ca    8.0<L>      24 Apr 2019 07:10  Phos  2.8     04-24  Mg     2.4     04-24    TPro  6.2  /  Alb  2.4<L>  /  TBili  0.6  /  DBili  x   /  AST  31  /  ALT  17  /  AlkPhos  104  04-24            HbA1C: 6.0 % (04-23 @ 11:31)    CAPILLARY BLOOD GLUCOSE      POCT Blood Glucose.: 99 mg/dL (25 Apr 2019 11:55)  POCT Blood Glucose.: 129 mg/dL (25 Apr 2019 07:22)  POCT Blood Glucose.: 130 mg/dL (24 Apr 2019 21:59)  POCT Blood Glucose.: 106 mg/dL (24 Apr 2019 15:57)  POCT Blood Glucose.: 177 mg/dL (24 Apr 2019 14:24)      Insulin Sliding Scale requirements X 24 Hours:    RADIOLOGY & ADDITIONAL TESTS:    A/P: 39y Female with history of DM type II presenting for       1.  DM -     Please continue           units lantus at bedtime  / in the morning and        units lispro with meals and lispro moderate / low dose sliding scale 4 times daily with meals and at bedtime.  Please continue consistent carbohydrate diet.      Goal FSG is   Will continue to monitor   For discharge, pt can continue    Pt can follow up at discharge with [facility] by calling  to make an appointment.   Will discuss case with     and update primary team

## 2019-04-25 NOTE — PROGRESS NOTE ADULT - ASSESSMENT
39 yo F POD5 s/p C/S#3, R tubal ligation, and KATH ligation c/b PPH and superimposed preeclampsia on IV Magnesium.  -superimposed PEC: s/p IV Mg, labs stable, BP well controlled on home regimen, asymptomatic  -PPH: hgb stable after 2 u prbcs  -T2DM: Patient now tolerating reg diet, f/u endo recs for restarting insulin vs metformin  -GI: Diabetic diet as tolerated, simethicone, dulcolax, Miralax BID, Pepcid, Maalox, Tums, and Zofran. -: voiding  -Pulm: albuterol prn, patient refusing duonebs and pulm referral  -ID: serous drainage from incision however hx of wound infxn, consider dc'ing on clinda w/ diflucan  -DVT ppx: SQH

## 2019-04-25 NOTE — DISCHARGE NOTE OB - HOSPITAL COURSE
Postpartum course complicated by Pre-eclampsia, blood pressure controlled with 200mg, patient to followup for wound check monday 4/29 as well as BP check  Patient has been followed closely by endocrinology patient to followup with endocrine as soon as possible

## 2019-04-25 NOTE — DISCHARGE NOTE OB - PLAN OF CARE
safe discharge to home please followup with primary Ob on Monday 4/29 to remove staples, please return to hospital if there is any continued leaking of fluid from incision, redness or swelling please start taking glyburide 2.5mg every day by mouth in the morning for Type 2 diabetes  Please call [facility] by calling  to make an appointment as soon as possible BP control Follow up within your OB in one week for a blood pressure check. Check bp 3x a day. If blood pressure greater than 160/110, you develop a headache not relieved by tylenol, visual disturbances, or right upper abdominal pain, call your doctor or the hospital, or go to your nearest emergency room. Regular diet, normal activity, nothing in the vagina for 6 weeks--no sex, tampons, tub baths, or swimming pools.   Please continue to take Labetalol 200mg twice a day and check your BP 3x/day

## 2019-04-25 NOTE — DISCHARGE NOTE OB - PATIENT PORTAL LINK FT
You can access the [email or web address] Patient Portal, offered by [employer], by registering with the following website: [email or web address]

## 2019-04-25 NOTE — PROGRESS NOTE ADULT - ASSESSMENT
Incomplete Note  39F PMH of DM, HTN, asthma, multiple LEEP surgeries, hernia s/p mesh placement requiring 3 additional surgeries, R tubal ligation, and KATH ligation c/b PPH and superimposed preeclampsia.  GI was consulted for constipation.    #Constipation- likely post op ileus, exacerbated by narcotics and bed-rest  -Pt is beginning to pass gas, no BM yet  -Encourage ambulation  -Recommend taper of opioids; Patient is not as agreeable as before.  -Recommend bowel regimen including miralax 17 gm BID  -ensure electrolytes are WNL  -anti-emetics prn    #Acute anemia, possible post op blood loss  -pt has no signs of active GIB, responded appropriately to prbcs  -Defer to primary team for workup  -notify GI if signs of GI Blood loss 39F PMH of DM, HTN, asthma, multiple LEEP surgeries, hernia s/p mesh placement requiring 3 additional surgeries, R tubal ligation, and KATH ligation c/b PPH and superimposed preeclampsia.  GI was consulted for constipation.    #Constipation- likely post op ileus, exacerbated by narcotics and bed-rest  -Pt is beginning to pass gas, no BM yet  -Encourage ambulation  -Recommend taper of opioids  -Recommend bowel regimen including miralax 17 gm BID  -ensure electrolytes are WNL  -anti-emetics prn    #Acute anemia, possible post op blood loss  -pt has no signs of active GIB, responded appropriately to prbcs  -Defer to primary team for workup  -notify GI if signs of GI Blood loss 39F PMH of DM, HTN, asthma, multiple LEEP surgeries, hernia s/p mesh placement requiring 3 additional surgeries, R tubal ligation, and KATH ligation c/b PPH and superimposed preeclampsia.  GI was consulted for constipation.    #Constipation- likely post op ileus, exacerbated by narcotics and bed-rest  -Pt is beginning to pass gas, and had a small bowel movement this afternoon  -Encourage ambulation  -Recommend taper of opioids  -Recommend bowel regimen including miralax 17 gm BID  -ensure electrolytes are WNL  -anti-emetics prn  -Notify GI if any further questions    #Acute anemia, possible post op blood loss  -pt has no signs of active GIB, responded appropriately to prbcs  -Defer to primary team for workup  -notify GI if signs of GI Blood loss    Reconsult with further questions

## 2019-04-25 NOTE — DISCHARGE NOTE OB - MEDICATION SUMMARY - MEDICATIONS TO STOP TAKING
I will STOP taking the medications listed below when I get home from the hospital:    Diclegis 10 mg-10 mg oral delayed release tablet  -- 2 tab(s) by mouth once a day (at bedtime)   -- Do not chew, break, or crush.  Do not drink alcoholic beverages when taking this medication.  May cause drowsiness.  Alcohol may intensify this effect.  Use care when operating dangerous machinery.  Some non-prescription drugs may aggravate your condition.  Read all labels carefully.  If a warning appears, check with your doctor before taking.  Swallow whole.  Do not crush.  Take medication on an empty stomach 1 hour before or 2 to 3 hours after a meal unless otherwise directed by your doctor.  This drug may impair the ability to drive or operate machinery.  Use care until you become familiar with its effects.    NIFEdipine 30 mg oral tablet, extended release  -- 1 tab(s) by mouth 2 times a day   -- Avoid grapefruit and grapefruit juice while taking this medication.  It is very important that you take or use this exactly as directed.  Do not skip doses or discontinue unless directed by your doctor.  Some non-prescription drugs may aggravate your condition.  Read all labels carefully.  If a warning appears, check with your doctor before taking.  Swallow whole.  Do not crush.    terconazole 80 mg vaginal suppository  -- 1 suppository(ies) intravaginally once a day (at bedtime)   -- Finish all this medication unless otherwise directed by prescriber.  For vaginal use.    nitrofurantoin macrocrystals-monohydrate 100 mg oral capsule  -- 1 cap(s) by mouth 2 times a day (with meals)

## 2019-04-25 NOTE — PROGRESS NOTE ADULT - ASSESSMENT
39yoF a PMH of DM, HTN, asthma, CIN3 s/p LEEP 1999, CIN2 s/p LEEP 2005, herna c/b possible incarcerated bowel 2017 s/p mesh placement requiring 3 additional surgeries, now POD3 s/p C/S#3, R tubal ligation, and KATH ligation c/b postpartum hemorrhage.     # Type 2 DM  - Dx 2005, been on metformin since 2007 1000mg BID  - diet has been poor since admission d/t n/v  - on sliding scale only. Sugars in low 100's not requiring coverage however still has poor diet  Recommendations  - will update after rounds 39yoF a PMH of DM, HTN, asthma, CIN3 s/p LEEP 1999, CIN2 s/p LEEP 2005, herna c/b possible incarcerated bowel 2017 s/p mesh placement requiring 3 additional surgeries, now POD3 s/p C/S#3, R tubal ligation, and KATH ligation c/b postpartum hemorrhage.     # Type 2 DM  - Dx 2005, been on metformin since 2007 1000mg BID  - diet has been poor since admission d/t n/v  - on sliding scale only. Sugars in low 100's not requiring coverage however still has poor diet  Recommendations  - can discharge on glipizide 2.5mg PO once daily  - Pt can follow up at discharge with [facility] by calling  to make an appointment.

## 2019-04-25 NOTE — DIETITIAN INITIAL EVALUATION ADULT. - OTHER INFO
38 y/o female admitted for C/S for 41weeks and 3days gestation.Noted history of HTN and DM. Presently with complaints of abdominal pain/nausea and constipation.SKin with surgical incision from C/S.Well versed on diet.Reflective endocrine profile stable..

## 2019-04-26 LAB — SURGICAL PATHOLOGY STUDY: SIGNIFICANT CHANGE UP

## 2019-04-27 VITALS
HEIGHT: 67 IN | OXYGEN SATURATION: 100 % | RESPIRATION RATE: 21 BRPM | HEART RATE: 143 BPM | SYSTOLIC BLOOD PRESSURE: 134 MMHG | DIASTOLIC BLOOD PRESSURE: 70 MMHG | TEMPERATURE: 98 F | WEIGHT: 214.95 LBS

## 2019-04-27 LAB
ALBUMIN SERPL ELPH-MCNC: 3.4 G/DL — SIGNIFICANT CHANGE UP (ref 3.3–5)
ALP SERPL-CCNC: 104 U/L — SIGNIFICANT CHANGE UP (ref 40–120)
ALT FLD-CCNC: 17 U/L — SIGNIFICANT CHANGE UP (ref 10–45)
ANION GAP SERPL CALC-SCNC: 17 MMOL/L — SIGNIFICANT CHANGE UP (ref 5–17)
APTT BLD: 25 SEC — LOW (ref 27.5–36.3)
AST SERPL-CCNC: 26 U/L — SIGNIFICANT CHANGE UP (ref 10–40)
BASOPHILS # BLD AUTO: 0.03 K/UL — SIGNIFICANT CHANGE UP (ref 0–0.2)
BASOPHILS NFR BLD AUTO: 0.2 % — SIGNIFICANT CHANGE UP (ref 0–2)
BILIRUB SERPL-MCNC: 0.8 MG/DL — SIGNIFICANT CHANGE UP (ref 0.2–1.2)
BLD GP AB SCN SERPL QL: POSITIVE — SIGNIFICANT CHANGE UP
BUN SERPL-MCNC: 11 MG/DL — SIGNIFICANT CHANGE UP (ref 7–23)
CALCIUM SERPL-MCNC: 8.9 MG/DL — SIGNIFICANT CHANGE UP (ref 8.4–10.5)
CHLORIDE SERPL-SCNC: 100 MMOL/L — SIGNIFICANT CHANGE UP (ref 96–108)
CO2 SERPL-SCNC: 19 MMOL/L — LOW (ref 22–31)
CREAT SERPL-MCNC: 0.99 MG/DL — SIGNIFICANT CHANGE UP (ref 0.5–1.3)
EOSINOPHIL # BLD AUTO: 0.18 K/UL — SIGNIFICANT CHANGE UP (ref 0–0.5)
EOSINOPHIL NFR BLD AUTO: 1 % — SIGNIFICANT CHANGE UP (ref 0–6)
GLUCOSE SERPL-MCNC: 142 MG/DL — HIGH (ref 70–99)
HCT VFR BLD CALC: 32.7 % — LOW (ref 34.5–45)
HGB BLD-MCNC: 10.3 G/DL — LOW (ref 11.5–15.5)
IMM GRANULOCYTES NFR BLD AUTO: 1.9 % — HIGH (ref 0–1.5)
INR BLD: 1.17 — HIGH (ref 0.88–1.16)
LYMPHOCYTES # BLD AUTO: 1.83 K/UL — SIGNIFICANT CHANGE UP (ref 1–3.3)
LYMPHOCYTES # BLD AUTO: 10 % — LOW (ref 13–44)
MCHC RBC-ENTMCNC: 27.2 PG — SIGNIFICANT CHANGE UP (ref 27–34)
MCHC RBC-ENTMCNC: 31.5 GM/DL — LOW (ref 32–36)
MCV RBC AUTO: 86.5 FL — SIGNIFICANT CHANGE UP (ref 80–100)
MONOCYTES # BLD AUTO: 1.32 K/UL — HIGH (ref 0–0.9)
MONOCYTES NFR BLD AUTO: 7.2 % — SIGNIFICANT CHANGE UP (ref 2–14)
NEUTROPHILS # BLD AUTO: 14.54 K/UL — HIGH (ref 1.8–7.4)
NEUTROPHILS NFR BLD AUTO: 79.7 % — HIGH (ref 43–77)
NRBC # BLD: 0 /100 WBCS — SIGNIFICANT CHANGE UP (ref 0–0)
PLATELET # BLD AUTO: 609 K/UL — HIGH (ref 150–400)
POTASSIUM SERPL-MCNC: 4.6 MMOL/L — SIGNIFICANT CHANGE UP (ref 3.5–5.3)
POTASSIUM SERPL-SCNC: 4.6 MMOL/L — SIGNIFICANT CHANGE UP (ref 3.5–5.3)
PROT SERPL-MCNC: 7.8 G/DL — SIGNIFICANT CHANGE UP (ref 6–8.3)
PROTHROM AB SERPL-ACNC: 13.3 SEC — HIGH (ref 10–12.9)
RBC # BLD: 3.78 M/UL — LOW (ref 3.8–5.2)
RBC # FLD: 17.3 % — HIGH (ref 10.3–14.5)
RH IG SCN BLD-IMP: NEGATIVE — SIGNIFICANT CHANGE UP
SODIUM SERPL-SCNC: 136 MMOL/L — SIGNIFICANT CHANGE UP (ref 135–145)
WBC # BLD: 18.25 K/UL — HIGH (ref 3.8–10.5)
WBC # FLD AUTO: 18.25 K/UL — HIGH (ref 3.8–10.5)

## 2019-04-27 PROCEDURE — 86077 PHYS BLOOD BANK SERV XMATCH: CPT

## 2019-04-27 PROCEDURE — 74176 CT ABD & PELVIS W/O CONTRAST: CPT | Mod: 26

## 2019-04-27 RX ORDER — MORPHINE SULFATE 50 MG/1
4 CAPSULE, EXTENDED RELEASE ORAL ONCE
Qty: 0 | Refills: 0 | Status: DISCONTINUED | OUTPATIENT
Start: 2019-04-27 | End: 2019-04-27

## 2019-04-27 RX ORDER — MORPHINE SULFATE 50 MG/1
6 CAPSULE, EXTENDED RELEASE ORAL ONCE
Qty: 0 | Refills: 0 | Status: DISCONTINUED | OUTPATIENT
Start: 2019-04-27 | End: 2019-04-27

## 2019-04-27 RX ORDER — SODIUM CHLORIDE 9 MG/ML
1000 INJECTION INTRAMUSCULAR; INTRAVENOUS; SUBCUTANEOUS ONCE
Qty: 0 | Refills: 0 | Status: COMPLETED | OUTPATIENT
Start: 2019-04-27 | End: 2019-04-27

## 2019-04-27 RX ORDER — GENTAMICIN SULFATE 40 MG/ML
380 VIAL (ML) INJECTION ONCE
Qty: 0 | Refills: 0 | Status: COMPLETED | OUTPATIENT
Start: 2019-04-27 | End: 2019-04-27

## 2019-04-27 RX ORDER — GENTAMICIN SULFATE 40 MG/ML
4000 VIAL (ML) INJECTION ONCE
Qty: 0 | Refills: 0 | Status: DISCONTINUED | OUTPATIENT
Start: 2019-04-27 | End: 2019-04-27

## 2019-04-27 RX ADMIN — MORPHINE SULFATE 4 MILLIGRAM(S): 50 CAPSULE, EXTENDED RELEASE ORAL at 23:27

## 2019-04-27 RX ADMIN — SODIUM CHLORIDE 2000 MILLILITER(S): 9 INJECTION INTRAMUSCULAR; INTRAVENOUS; SUBCUTANEOUS at 23:00

## 2019-04-27 RX ADMIN — MORPHINE SULFATE 6 MILLIGRAM(S): 50 CAPSULE, EXTENDED RELEASE ORAL at 23:00

## 2019-04-27 RX ADMIN — Medication 100 MILLIGRAM(S): at 23:45

## 2019-04-27 RX ADMIN — MORPHINE SULFATE 6 MILLIGRAM(S): 50 CAPSULE, EXTENDED RELEASE ORAL at 22:33

## 2019-04-27 RX ADMIN — SODIUM CHLORIDE 2000 MILLILITER(S): 9 INJECTION INTRAMUSCULAR; INTRAVENOUS; SUBCUTANEOUS at 22:23

## 2019-04-27 NOTE — ED ADULT TRIAGE NOTE - OTHER COMPLAINTS
CC of post op comp manifesting abd pain s/p CS 3, , + NV, staples is "blowing off" as per the pt, pain 10/10.

## 2019-04-27 NOTE — ED PROVIDER NOTE - PHYSICAL EXAMINATION
CONSTITUTIONAL: Well-appearing; well-nourished; in no apparent distress.   HEAD: Normocephalic; atraumatic.   EYES:  conjunctiva and sclera clear  ENT: normal nose; no rhinorrhea; normal pharynx with no erythema or lesions.   NECK: Supple; non-tender;   CARDIOVASCULAR: Normal S1, S2; no murmurs, rubs, or gallops. Regular rate and rhythm.   RESPIRATORY: Breathing easily; breath sounds clear and equal bilaterally; no wheezes, rhonchi, or rales.  GI: soft, distended, diffusely tender to palpation, +guarding/+rebound  EXT: No cyanosis or edema; N/V intact  SKIN: Normal for age and race; warm; dry; good turgor; no apparent lesions or rash.   NEURO: A & O x 3; face symmetric; grossly unremarkable.   PSYCHOLOGICAL: The patient’s mood and manner are appropriate.

## 2019-04-27 NOTE — ED PROVIDER NOTE - CLINICAL SUMMARY MEDICAL DECISION MAKING FREE TEXT BOX
here w/ severe abdominal pain and bleeding in setting of recent c section. concern for hematoma vs abscess. gyn consulted immediately, plan for ct, pain control, likely need for OR

## 2019-04-27 NOTE — ED PROVIDER NOTE - OBJECTIVE STATEMENT
38 yo F post op 1 week s/p C/S#3, R tubal ligation, and KATH ligation c/b PPH requiring PRBC transfusion and superimposed preeclampsia s/p IV Magnesium, returns today w/ 1 hr sudden heavy hemorrhage from vagina, increasing pain to abdomen, staples coming out, and foul smell to lochia. no fevers/chills. happened suddenly. pain is severe. came in right away to the hospital, did not take anything for the pain. states soaked through a diaper and her clothes.

## 2019-04-27 NOTE — ED ADULT NURSE NOTE - NSIMPLEMENTINTERV_GEN_ALL_ED
Implemented All Universal Safety Interventions:  [place] to call system. Call bell, personal items and telephone within reach. Instruct patient to call for assistance. Room bathroom lighting operational. Non-slip footwear when patient is off stretcher. Physically safe environment: no spills, clutter or unnecessary equipment. Stretcher in lowest position, wheels locked, appropriate side rails in place.

## 2019-04-27 NOTE — ED ADULT NURSE NOTE - OBJECTIVE STATEMENT
pt is 1 week s/p c section who present with 1 hour of heavy vaginal bleeding, foul smelling discharge and abd staples coming out.  pt is anxious.  no n/v.  pt had post partum hemorrhage and needed 2 units PRBC.

## 2019-04-28 ENCOUNTER — INPATIENT (INPATIENT)
Facility: HOSPITAL | Age: 39
LOS: 9 days | Discharge: HOME HEALTH | DRG: 769 | End: 2019-05-08
Attending: OBSTETRICS & GYNECOLOGY | Admitting: OBSTETRICS & GYNECOLOGY
Payer: COMMERCIAL

## 2019-04-28 DIAGNOSIS — Z98.89 OTHER SPECIFIED POSTPROCEDURAL STATES: Chronic | ICD-10-CM

## 2019-04-28 DIAGNOSIS — Z98.890 OTHER SPECIFIED POSTPROCEDURAL STATES: Chronic | ICD-10-CM

## 2019-04-28 LAB
ALBUMIN SERPL ELPH-MCNC: 2.3 G/DL — LOW (ref 3.3–5)
ALP SERPL-CCNC: 96 U/L — SIGNIFICANT CHANGE UP (ref 40–120)
ALT FLD-CCNC: 11 U/L — SIGNIFICANT CHANGE UP (ref 10–45)
ANION GAP SERPL CALC-SCNC: 12 MMOL/L — SIGNIFICANT CHANGE UP (ref 5–17)
APPEARANCE UR: CLEAR — SIGNIFICANT CHANGE UP
AST SERPL-CCNC: 11 U/L — SIGNIFICANT CHANGE UP (ref 10–40)
BASOPHILS # BLD AUTO: 0.01 K/UL — SIGNIFICANT CHANGE UP (ref 0–0.2)
BASOPHILS NFR BLD AUTO: 0.1 % — SIGNIFICANT CHANGE UP (ref 0–2)
BILIRUB SERPL-MCNC: 0.5 MG/DL — SIGNIFICANT CHANGE UP (ref 0.2–1.2)
BILIRUB UR-MCNC: NEGATIVE — SIGNIFICANT CHANGE UP
BLD GP AB SCN SERPL QL: POSITIVE — SIGNIFICANT CHANGE UP
BUN SERPL-MCNC: 9 MG/DL — SIGNIFICANT CHANGE UP (ref 7–23)
CALCIUM SERPL-MCNC: 8.6 MG/DL — SIGNIFICANT CHANGE UP (ref 8.4–10.5)
CHLORIDE SERPL-SCNC: 105 MMOL/L — SIGNIFICANT CHANGE UP (ref 96–108)
CO2 SERPL-SCNC: 19 MMOL/L — LOW (ref 22–31)
COLOR SPEC: YELLOW — SIGNIFICANT CHANGE UP
CREAT ?TM UR-MCNC: 103 MG/DL — SIGNIFICANT CHANGE UP
CREAT SERPL-MCNC: 0.82 MG/DL — SIGNIFICANT CHANGE UP (ref 0.5–1.3)
DIFF PNL FLD: ABNORMAL
EOSINOPHIL # BLD AUTO: 0.01 K/UL — SIGNIFICANT CHANGE UP (ref 0–0.5)
EOSINOPHIL NFR BLD AUTO: 0.1 % — SIGNIFICANT CHANGE UP (ref 0–6)
GLUCOSE BLDC GLUCOMTR-MCNC: 118 MG/DL — HIGH (ref 70–99)
GLUCOSE BLDC GLUCOMTR-MCNC: 155 MG/DL — HIGH (ref 70–99)
GLUCOSE BLDC GLUCOMTR-MCNC: 165 MG/DL — HIGH (ref 70–99)
GLUCOSE BLDC GLUCOMTR-MCNC: 181 MG/DL — HIGH (ref 70–99)
GLUCOSE SERPL-MCNC: 165 MG/DL — HIGH (ref 70–99)
GLUCOSE UR QL: NEGATIVE — SIGNIFICANT CHANGE UP
HCT VFR BLD CALC: 19.7 % — CRITICAL LOW (ref 34.5–45)
HCT VFR BLD CALC: 33.2 % — LOW (ref 34.5–45)
HGB BLD-MCNC: 10.3 G/DL — LOW (ref 11.5–15.5)
HGB BLD-MCNC: 6 G/DL — CRITICAL LOW (ref 11.5–15.5)
IMM GRANULOCYTES NFR BLD AUTO: 1.4 % — SIGNIFICANT CHANGE UP (ref 0–1.5)
KETONES UR-MCNC: NEGATIVE — SIGNIFICANT CHANGE UP
LDH SERPL L TO P-CCNC: 393 U/L — HIGH (ref 50–242)
LEUKOCYTE ESTERASE UR-ACNC: ABNORMAL
LYMPHOCYTES # BLD AUTO: 1.05 K/UL — SIGNIFICANT CHANGE UP (ref 1–3.3)
LYMPHOCYTES # BLD AUTO: 6.6 % — LOW (ref 13–44)
MCHC RBC-ENTMCNC: 27 PG — SIGNIFICANT CHANGE UP (ref 27–34)
MCHC RBC-ENTMCNC: 27 PG — SIGNIFICANT CHANGE UP (ref 27–34)
MCHC RBC-ENTMCNC: 30.5 GM/DL — LOW (ref 32–36)
MCHC RBC-ENTMCNC: 31 GM/DL — LOW (ref 32–36)
MCV RBC AUTO: 86.9 FL — SIGNIFICANT CHANGE UP (ref 80–100)
MCV RBC AUTO: 88.7 FL — SIGNIFICANT CHANGE UP (ref 80–100)
MONOCYTES # BLD AUTO: 0.47 K/UL — SIGNIFICANT CHANGE UP (ref 0–0.9)
MONOCYTES NFR BLD AUTO: 2.9 % — SIGNIFICANT CHANGE UP (ref 2–14)
NEUTROPHILS # BLD AUTO: 14.24 K/UL — HIGH (ref 1.8–7.4)
NEUTROPHILS NFR BLD AUTO: 88.9 % — HIGH (ref 43–77)
NITRITE UR-MCNC: NEGATIVE — SIGNIFICANT CHANGE UP
NRBC # BLD: 0 /100 WBCS — SIGNIFICANT CHANGE UP (ref 0–0)
NRBC # BLD: 0 /100 WBCS — SIGNIFICANT CHANGE UP (ref 0–0)
PH UR: 6.5 — SIGNIFICANT CHANGE UP (ref 5–8)
PLATELET # BLD AUTO: 325 K/UL — SIGNIFICANT CHANGE UP (ref 150–400)
PLATELET # BLD AUTO: 440 K/UL — HIGH (ref 150–400)
POTASSIUM SERPL-MCNC: 4.2 MMOL/L — SIGNIFICANT CHANGE UP (ref 3.5–5.3)
POTASSIUM SERPL-SCNC: 4.2 MMOL/L — SIGNIFICANT CHANGE UP (ref 3.5–5.3)
PROT ?TM UR-MCNC: 44 MG/DL — HIGH (ref 0–12)
PROT SERPL-MCNC: 6.4 G/DL — SIGNIFICANT CHANGE UP (ref 6–8.3)
PROT UR-MCNC: 30 MG/DL
PROT/CREAT UR-RTO: 0.4 RATIO — HIGH (ref 0–0.2)
RBC # BLD: 2.22 M/UL — LOW (ref 3.8–5.2)
RBC # BLD: 3.82 M/UL — SIGNIFICANT CHANGE UP (ref 3.8–5.2)
RBC # FLD: 16.5 % — HIGH (ref 10.3–14.5)
RBC # FLD: 17.3 % — HIGH (ref 10.3–14.5)
RH IG SCN BLD-IMP: NEGATIVE — SIGNIFICANT CHANGE UP
SODIUM SERPL-SCNC: 136 MMOL/L — SIGNIFICANT CHANGE UP (ref 135–145)
SP GR SPEC: 1.01 — SIGNIFICANT CHANGE UP (ref 1–1.03)
URATE SERPL-MCNC: 4.2 MG/DL — SIGNIFICANT CHANGE UP (ref 2.5–7)
UROBILINOGEN FLD QL: 0.2 E.U./DL — SIGNIFICANT CHANGE UP
WBC # BLD: 12.99 K/UL — HIGH (ref 3.8–10.5)
WBC # BLD: 16.01 K/UL — HIGH (ref 3.8–10.5)
WBC # FLD AUTO: 12.99 K/UL — HIGH (ref 3.8–10.5)
WBC # FLD AUTO: 16.01 K/UL — HIGH (ref 3.8–10.5)

## 2019-04-28 PROCEDURE — 99291 CRITICAL CARE FIRST HOUR: CPT

## 2019-04-28 RX ORDER — NALOXONE HYDROCHLORIDE 4 MG/.1ML
0.1 SPRAY NASAL
Qty: 0 | Refills: 0 | Status: DISCONTINUED | OUTPATIENT
Start: 2019-04-28 | End: 2019-04-28

## 2019-04-28 RX ORDER — OXYCODONE HYDROCHLORIDE 5 MG/1
10 TABLET ORAL EVERY 6 HOURS
Qty: 0 | Refills: 0 | Status: DISCONTINUED | OUTPATIENT
Start: 2019-04-28 | End: 2019-05-05

## 2019-04-28 RX ORDER — GENTAMICIN SULFATE 40 MG/ML
380 VIAL (ML) INJECTION ONCE
Qty: 0 | Refills: 0 | Status: COMPLETED | OUTPATIENT
Start: 2019-04-28 | End: 2019-04-28

## 2019-04-28 RX ORDER — SODIUM CHLORIDE 9 MG/ML
1000 INJECTION, SOLUTION INTRAVENOUS
Qty: 0 | Refills: 0 | Status: DISCONTINUED | OUTPATIENT
Start: 2019-04-28 | End: 2019-04-28

## 2019-04-28 RX ORDER — LABETALOL HCL 100 MG
200 TABLET ORAL EVERY 12 HOURS
Qty: 0 | Refills: 0 | Status: DISCONTINUED | OUTPATIENT
Start: 2019-04-28 | End: 2019-05-06

## 2019-04-28 RX ORDER — DIPHENHYDRAMINE HCL 50 MG
25 CAPSULE ORAL EVERY 6 HOURS
Qty: 0 | Refills: 0 | Status: DISCONTINUED | OUTPATIENT
Start: 2019-04-28 | End: 2019-04-28

## 2019-04-28 RX ORDER — OXYCODONE AND ACETAMINOPHEN 5; 325 MG/1; MG/1
2 TABLET ORAL EVERY 6 HOURS
Qty: 0 | Refills: 0 | Status: DISCONTINUED | OUTPATIENT
Start: 2019-04-28 | End: 2019-04-28

## 2019-04-28 RX ORDER — LANOLIN
1 OINTMENT (GRAM) TOPICAL
Qty: 0 | Refills: 0 | Status: DISCONTINUED | OUTPATIENT
Start: 2019-04-28 | End: 2019-04-28

## 2019-04-28 RX ORDER — SIMETHICONE 80 MG/1
80 TABLET, CHEWABLE ORAL EVERY 4 HOURS
Qty: 0 | Refills: 0 | Status: DISCONTINUED | OUTPATIENT
Start: 2019-04-28 | End: 2019-04-28

## 2019-04-28 RX ORDER — GLUCAGON INJECTION, SOLUTION 0.5 MG/.1ML
1 INJECTION, SOLUTION SUBCUTANEOUS ONCE
Qty: 0 | Refills: 0 | Status: DISCONTINUED | OUTPATIENT
Start: 2019-04-28 | End: 2019-05-08

## 2019-04-28 RX ORDER — GENTAMICIN SULFATE 40 MG/ML
450 VIAL (ML) INJECTION ONCE
Qty: 0 | Refills: 0 | Status: DISCONTINUED | OUTPATIENT
Start: 2019-04-28 | End: 2019-04-28

## 2019-04-28 RX ORDER — DEXTROSE 50 % IN WATER 50 %
25 SYRINGE (ML) INTRAVENOUS ONCE
Qty: 0 | Refills: 0 | Status: DISCONTINUED | OUTPATIENT
Start: 2019-04-28 | End: 2019-05-08

## 2019-04-28 RX ORDER — OXYCODONE AND ACETAMINOPHEN 5; 325 MG/1; MG/1
1 TABLET ORAL
Qty: 0 | Refills: 0 | Status: DISCONTINUED | OUTPATIENT
Start: 2019-04-28 | End: 2019-04-28

## 2019-04-28 RX ORDER — DOCUSATE SODIUM 100 MG
100 CAPSULE ORAL
Qty: 0 | Refills: 0 | Status: DISCONTINUED | OUTPATIENT
Start: 2019-04-28 | End: 2019-04-28

## 2019-04-28 RX ORDER — GLYCERIN ADULT
1 SUPPOSITORY, RECTAL RECTAL AT BEDTIME
Qty: 0 | Refills: 0 | Status: DISCONTINUED | OUTPATIENT
Start: 2019-04-28 | End: 2019-04-28

## 2019-04-28 RX ORDER — KETOROLAC TROMETHAMINE 30 MG/ML
30 SYRINGE (ML) INJECTION EVERY 6 HOURS
Qty: 0 | Refills: 0 | Status: DISCONTINUED | OUTPATIENT
Start: 2019-04-28 | End: 2019-04-28

## 2019-04-28 RX ORDER — IBUPROFEN 200 MG
600 TABLET ORAL EVERY 6 HOURS
Qty: 0 | Refills: 0 | Status: COMPLETED | OUTPATIENT
Start: 2019-04-28 | End: 2019-04-30

## 2019-04-28 RX ORDER — ONDANSETRON 8 MG/1
4 TABLET, FILM COATED ORAL EVERY 6 HOURS
Qty: 0 | Refills: 0 | Status: DISCONTINUED | OUTPATIENT
Start: 2019-04-28 | End: 2019-05-08

## 2019-04-28 RX ORDER — METOCLOPRAMIDE HCL 10 MG
10 TABLET ORAL EVERY 6 HOURS
Qty: 0 | Refills: 0 | Status: DISCONTINUED | OUTPATIENT
Start: 2019-04-28 | End: 2019-05-08

## 2019-04-28 RX ORDER — IBUPROFEN 200 MG
600 TABLET ORAL EVERY 6 HOURS
Qty: 0 | Refills: 0 | Status: DISCONTINUED | OUTPATIENT
Start: 2019-04-28 | End: 2019-04-28

## 2019-04-28 RX ORDER — OXYCODONE HYDROCHLORIDE 5 MG/1
5 TABLET ORAL EVERY 4 HOURS
Qty: 0 | Refills: 0 | Status: DISCONTINUED | OUTPATIENT
Start: 2019-04-28 | End: 2019-05-04

## 2019-04-28 RX ORDER — SODIUM CHLORIDE 9 MG/ML
1000 INJECTION, SOLUTION INTRAVENOUS
Qty: 0 | Refills: 0 | Status: DISCONTINUED | OUTPATIENT
Start: 2019-04-28 | End: 2019-05-07

## 2019-04-28 RX ORDER — HYDROMORPHONE HYDROCHLORIDE 2 MG/ML
0.5 INJECTION INTRAMUSCULAR; INTRAVENOUS; SUBCUTANEOUS
Qty: 0 | Refills: 0 | Status: DISCONTINUED | OUTPATIENT
Start: 2019-04-28 | End: 2019-04-28

## 2019-04-28 RX ORDER — DEXTROSE 50 % IN WATER 50 %
15 SYRINGE (ML) INTRAVENOUS ONCE
Qty: 0 | Refills: 0 | Status: DISCONTINUED | OUTPATIENT
Start: 2019-04-28 | End: 2019-05-08

## 2019-04-28 RX ORDER — DEXTROSE 50 % IN WATER 50 %
12.5 SYRINGE (ML) INTRAVENOUS ONCE
Qty: 0 | Refills: 0 | Status: DISCONTINUED | OUTPATIENT
Start: 2019-04-28 | End: 2019-05-08

## 2019-04-28 RX ORDER — HYDROMORPHONE HYDROCHLORIDE 2 MG/ML
30 INJECTION INTRAMUSCULAR; INTRAVENOUS; SUBCUTANEOUS
Qty: 0 | Refills: 0 | Status: DISCONTINUED | OUTPATIENT
Start: 2019-04-28 | End: 2019-04-28

## 2019-04-28 RX ORDER — INSULIN LISPRO 100/ML
VIAL (ML) SUBCUTANEOUS
Qty: 0 | Refills: 0 | Status: DISCONTINUED | OUTPATIENT
Start: 2019-04-28 | End: 2019-05-08

## 2019-04-28 RX ORDER — SODIUM CHLORIDE 9 MG/ML
1000 INJECTION, SOLUTION INTRAVENOUS
Qty: 0 | Refills: 0 | Status: DISCONTINUED | OUTPATIENT
Start: 2019-04-28 | End: 2019-05-08

## 2019-04-28 RX ORDER — FERROUS SULFATE 325(65) MG
325 TABLET ORAL DAILY
Qty: 0 | Refills: 0 | Status: DISCONTINUED | OUTPATIENT
Start: 2019-04-28 | End: 2019-04-28

## 2019-04-28 RX ORDER — ENOXAPARIN SODIUM 100 MG/ML
40 INJECTION SUBCUTANEOUS EVERY 24 HOURS
Qty: 0 | Refills: 0 | Status: DISCONTINUED | OUTPATIENT
Start: 2019-04-28 | End: 2019-04-30

## 2019-04-28 RX ORDER — LABETALOL HCL 100 MG
200 TABLET ORAL EVERY 12 HOURS
Qty: 0 | Refills: 0 | Status: DISCONTINUED | OUTPATIENT
Start: 2019-04-28 | End: 2019-04-28

## 2019-04-28 RX ORDER — HYDROMORPHONE HYDROCHLORIDE 2 MG/ML
0.2 INJECTION INTRAMUSCULAR; INTRAVENOUS; SUBCUTANEOUS ONCE
Qty: 0 | Refills: 0 | Status: DISCONTINUED | OUTPATIENT
Start: 2019-04-28 | End: 2019-04-28

## 2019-04-28 RX ORDER — ACETAMINOPHEN 500 MG
1000 TABLET ORAL EVERY 8 HOURS
Qty: 0 | Refills: 0 | Status: DISCONTINUED | OUTPATIENT
Start: 2019-04-28 | End: 2019-05-01

## 2019-04-28 RX ADMIN — HYDROMORPHONE HYDROCHLORIDE 0.2 MILLIGRAM(S): 2 INJECTION INTRAMUSCULAR; INTRAVENOUS; SUBCUTANEOUS at 22:40

## 2019-04-28 RX ADMIN — Medication 2: at 12:35

## 2019-04-28 RX ADMIN — Medication 200 MILLIGRAM(S): at 06:44

## 2019-04-28 RX ADMIN — Medication 2: at 08:08

## 2019-04-28 RX ADMIN — Medication 600 MILLIGRAM(S): at 22:06

## 2019-04-28 RX ADMIN — Medication 900 MILLIGRAM(S): at 00:26

## 2019-04-28 RX ADMIN — ONDANSETRON 4 MILLIGRAM(S): 8 TABLET, FILM COATED ORAL at 12:44

## 2019-04-28 RX ADMIN — MORPHINE SULFATE 4 MILLIGRAM(S): 50 CAPSULE, EXTENDED RELEASE ORAL at 00:43

## 2019-04-28 RX ADMIN — SODIUM CHLORIDE 1000 MILLILITER(S): 9 INJECTION INTRAMUSCULAR; INTRAVENOUS; SUBCUTANEOUS at 00:20

## 2019-04-28 RX ADMIN — Medication 219 MILLIGRAM(S): at 22:07

## 2019-04-28 RX ADMIN — Medication 219 MILLIGRAM(S): at 00:26

## 2019-04-28 RX ADMIN — Medication 30 MILLIGRAM(S): at 06:41

## 2019-04-28 RX ADMIN — Medication 1000 MILLIGRAM(S): at 13:05

## 2019-04-28 RX ADMIN — Medication 100 MILLIGRAM(S): at 17:44

## 2019-04-28 RX ADMIN — Medication 600 MILLIGRAM(S): at 22:10

## 2019-04-28 RX ADMIN — Medication 1000 MILLIGRAM(S): at 12:35

## 2019-04-28 RX ADMIN — ENOXAPARIN SODIUM 40 MILLIGRAM(S): 100 INJECTION SUBCUTANEOUS at 19:47

## 2019-04-28 RX ADMIN — Medication 30 MILLIGRAM(S): at 12:35

## 2019-04-28 RX ADMIN — Medication 30 MILLIGRAM(S): at 17:50

## 2019-04-28 RX ADMIN — Medication 100 MILLIGRAM(S): at 10:40

## 2019-04-28 RX ADMIN — Medication 1000 MILLIGRAM(S): at 20:30

## 2019-04-28 RX ADMIN — Medication 200 MILLIGRAM(S): at 16:45

## 2019-04-28 RX ADMIN — HYDROMORPHONE HYDROCHLORIDE 30 MILLILITER(S): 2 INJECTION INTRAMUSCULAR; INTRAVENOUS; SUBCUTANEOUS at 06:11

## 2019-04-28 RX ADMIN — Medication 30 MILLIGRAM(S): at 06:08

## 2019-04-28 RX ADMIN — OXYCODONE HYDROCHLORIDE 10 MILLIGRAM(S): 5 TABLET ORAL at 17:53

## 2019-04-28 RX ADMIN — Medication 1000 MILLIGRAM(S): at 19:47

## 2019-04-28 RX ADMIN — HYDROMORPHONE HYDROCHLORIDE 0.2 MILLIGRAM(S): 2 INJECTION INTRAMUSCULAR; INTRAVENOUS; SUBCUTANEOUS at 22:23

## 2019-04-28 RX ADMIN — SODIUM CHLORIDE 1000 MILLILITER(S): 9 INJECTION INTRAMUSCULAR; INTRAVENOUS; SUBCUTANEOUS at 00:26

## 2019-04-28 NOTE — BRIEF OPERATIVE NOTE - OPERATION/FINDINGS
skin incision; 500cc of clot in abdomen below rectus muscules; thickened scar tissue throughout pelvis

## 2019-04-28 NOTE — PROGRESS NOTE ADULT - ASSESSMENT
39 F with hx of severe preeclampsia, HTN, T2DM s/p C/S#3 on 4/20 presenting for severe abdominal pain, concern for intraabdominal bleeding, now POD1  s/p exploratory laparotomy and evacuation of hemoperitoneum. Now s/p 2 units PRBc w/ appropriate response in Hg this am.    Trend CBC, vitals  No further surgical intervention indicated at this time  Rest of care per primary team  General surgery available for reconsult at any time if needed  Discussed with attending Dr. Hollingsworth

## 2019-04-28 NOTE — CONSULT NOTE ADULT - SUBJECTIVE AND OBJECTIVE BOX
HPI :    This is a 37yo F with PMHx of HTN. DM, PSHx of VHR in  which complicated by mesh infection and fistula, s/p explantation of the mesh and fistulectomy in 2018, closure of the fascia in 2018, had I&D in April due to seroma collection at the previous surgical site. She also recently had  in  and discharged on . Patient presents today for abdominal pain and bleeding from her surgical site. Patient was breast feeding her  couple of hours ago and upon standing up, she felt a pop and one of her staple came out. Since then she noticed active bright red blood bleeding from the incision site and increase pain. Patient claimed that she vomited once prior coming to the ER. No fever, no chest pain, no SOB.    In the ER, there's a questionable wound dehiscence at the midway of her incision. There is active bright red bleeding from the opening. Other staples are intact.       Vital Signs Last 24 Hrs  T(C): 36.7 (2019 21:53), Max: 36.7 (2019 21:53)  T(F): 98 (2019 21:53), Max: 98 (2019 21:53)  HR: 103 (2019 00:25) (103 - 143)  BP: 146/76 (2019 00:25) (134/70 - 146/76)  RR: 20 (2019 00:25) (20 - 21)  SpO2: 99% (2019 00:25) (99% - 100%)  I&O's Detail      General: In distress, anxious and crying.  C/V: NSR  Pulm: Increase respiratory effort 2/2 anxiety   Abd: soft, tender at lower abdomen, mild erythema over the incision site, +bright red blood over the incision site, packing dressing placed. Attempted to probe incision site, unable to evaluate the fascia due to large pannus.   Extrem: WWP, no edema.        LABS:                        10.3   18.25 )-----------( 609      ( 2019 22:22 )             32.7         136  |  100  |  11  ----------------------------<  142<H>  4.6   |  19<L>  |  0.99    Ca    8.9      2019 22:22    TPro  7.8  /  Alb  3.4  /  TBili  0.8  /  DBili  x   /  AST  26  /  ALT  17  /  AlkPhos  104  -    PT/INR - ( 2019 22:22 )   PT: 13.3 sec;   INR: 1.17          PTT - ( 2019 22:22 )  PTT:25.0 sec      RADIOLOGY & ADDITIONAL STUDIES:    FINDINGS: Images of the lower chest demonstrate grossly clear lungs.   Trace pericardial effusion.    The liver is normal in appearance.   No radiopaque gallstones are seen.    The pancreas is normal in appearance. No splenic abnormalities are seen.    Nodular thickening left adrenal gland. Right adrenal gland unremarkable.   The kidneys are normal in appearance bilaterally. A couple punctate   nonobstructive renal stones bilaterally.  No hydronephrosis is evident.    No abdominal aortic aneurysm is seen. No retroperitoneal lymphadenopathy   is seen.    No bowel abnormalities are seen. There are postsurgical changes in the   anterior abdominal wall inferiorly with a collection of fluid and gas on   the right side measuring roughly 2.6 x 9.9 x 5.3 cm with fat stranding in   the area, possibly representing early abscess. There are bilateral rectus   sheath hematomas in the surgical area, left greater than right, with the   left side measuring up to 5.4 x 7.0 x 7.5 cm. There is a small   communication of hemorrhage from anterior abdominal wall to the   intrapelvic compartment with small layering hemorrhage anterior and   posterior to the uterus. Surgical staples are seen along the anterior   abdomen.    Images of the pelvis demonstrate enlarged uterus with small focus of gas   consistent with recent . No filling defects are seen in the   urinary bladder. No pelvic lymphadenopathy is seen.    Evaluation of the osseous structures demonstrates degenerative changes of   the spine.      IMPRESSION:  Postsurgical changes in the anterior abdominal wall inferiorly with a   collection of fluid and gas on the right side measuring roughly 2.6 x 9.9   x 5.3 cm with fat stranding in the area, possibly representing early   abscess.     Bilateral rectus sheath hematomas, left greater than right, as described   above. Small continuation of hemorrhage into the intrapelvic area.

## 2019-04-28 NOTE — PROGRESS NOTE ADULT - SUBJECTIVE AND OBJECTIVE BOX
Patient evaluated at bedside. She reports pain is tolerable on current pain regimen. She reports feeling lethargic but denies dizziness, SOB. She has not yet ambulated or eaten.   She denies heavy vaginal bleeding.    Physical Exam:  Vital Signs Last 24 Hrs  T(C): 36.8 (28 Apr 2019 08:55), Max: 36.8 (28 Apr 2019 00:46)  T(F): 98.2 (28 Apr 2019 08:55), Max: 98.2 (28 Apr 2019 00:46)  HR: 82 (28 Apr 2019 08:55) (72 - 143)  BP: 146/70 (28 Apr 2019 08:55) (116/77 - 181/88)  BP(mean): 132 (28 Apr 2019 05:15) (107 - 132)  RR: 17 (28 Apr 2019 08:55) (16 - 21)  SpO2: 99% (28 Apr 2019 08:55) (99% - 100%)    GA: NAD, A+0 x 3  Abd: soft, mild tenderness to palpation, nondistended, no rebound or guarding, incision bandaged clean, dry and intact, uterus firm at midline,  below the umbilicus. ABBIE drain with scant serosanguinous output  : lochia WNL  Extremities: no calf tenderness, SCDs in place                            10.3   16.01 )-----------( 440      ( 28 Apr 2019 11:06 )             33.2     04-27    136  |  100  |  11  ----------------------------<  142<H>  4.6   |  19<L>  |  0.99    Ca    8.9      27 Apr 2019 22:22    TPro  7.8  /  Alb  3.4  /  TBili  0.8  /  DBili  x   /  AST  26  /  ALT  17  /  AlkPhos  104  04-27      PT/INR - ( 27 Apr 2019 22:22 )   PT: 13.3 sec;   INR: 1.17          PTT - ( 27 Apr 2019 22:22 )  PTT:25.0 sec

## 2019-04-28 NOTE — H&P ADULT - NSHPPHYSICALEXAM_GEN_ALL_CORE
Vital Signs Last 24 Hrs  T(C): 36.7 (27 Apr 2019 21:53), Max: 36.7 (27 Apr 2019 21:53)  T(F): 98 (27 Apr 2019 21:53), Max: 98 (27 Apr 2019 21:53)  HR: 103 (28 Apr 2019 00:25) (103 - 143)  BP: 146/76 (28 Apr 2019 00:25) (134/70 - 146/76)  BP(mean): --  RR: 20 (28 Apr 2019 00:25) (20 - 21)  SpO2: 99% (28 Apr 2019 00:25) (99% - 100%)    Gen: laying in stretcher uncomfortable in severe distress, crying  Abd: soft, distended, diffusely tender to palpation, +guarding/+rebound; fascia appears to be intact with probing incision however copious bloody drainage noted with areas of skin separation; skin staples in place  Pelvic: deferred

## 2019-04-28 NOTE — PROGRESS NOTE ADULT - SUBJECTIVE AND OBJECTIVE BOX
SUBJECTIVE: Pt seen and examined at bedside. Pain is well controlled. Mild nausea, denies emesis. Passing flatus. Making appropriate UO. Post transfusion Hg with appropriate response.     clindamycin IVPB 900 milliGRAM(s) IV Intermittent every 8 hours  enoxaparin Injectable 40 milliGRAM(s) SubCutaneous every 24 hours  gentamicin   IVPB 380 milliGRAM(s) IV Intermittent once  labetalol 200 milliGRAM(s) Oral every 12 hours      Vital Signs Last 24 Hrs  T(C): 36.8 (28 Apr 2019 08:55), Max: 36.8 (28 Apr 2019 00:46)  T(F): 98.2 (28 Apr 2019 08:55), Max: 98.2 (28 Apr 2019 00:46)  HR: 82 (28 Apr 2019 08:55) (72 - 143)  BP: 146/70 (28 Apr 2019 08:55) (116/77 - 181/88)  BP(mean): 132 (28 Apr 2019 05:15) (107 - 132)  RR: 17 (28 Apr 2019 08:55) (16 - 21)  SpO2: 99% (28 Apr 2019 08:55) (99% - 100%)    General: NAD, resting comfortably in bed  Pulm: Nonlabored breathing, no respiratory distress  Abd: soft, mild distention, appropriately tender, dressing clean and intact with minimal SS drainage. ABBIE SS. No rebound, no guarding  Extrem: WWP, no edema, SCDs in place      LABS:                        10.3   16.01 )-----------( 440      ( 28 Apr 2019 11:06 )             33.2     04-28    136  |  105  |  9   ----------------------------<  165<H>  4.2   |  19<L>  |  0.82    Ca    8.6      28 Apr 2019 11:06    TPro  6.4  /  Alb  2.3<L>  /  TBili  0.5  /  DBili  x   /  AST  11  /  ALT  11  /  AlkPhos  96  04-28    PT/INR - ( 27 Apr 2019 22:22 )   PT: 13.3 sec;   INR: 1.17          PTT - ( 27 Apr 2019 22:22 )  PTT:25.0 sec

## 2019-04-28 NOTE — H&P ADULT - NSICDXPASTMEDICALHX_GEN_ALL_CORE_FT
PAST MEDICAL HISTORY:  Abdominal hernia     Diabetes     Essential hypertension     Hyperlipidemia     Obesity

## 2019-04-28 NOTE — PRE-OP CHECKLIST - SELECT TESTS ORDERED
CMP/BMP/CBC/PT/PTT/Urinalysis/Type and Cross/Type and Screen/Results in MD note CMP/BMP/PT/PTT/CXR/Results in MD note/Urinalysis/CBC/Type and Cross/Type and Screen/EKG CBC/BMP/PT/PTT/Urinalysis/CMP/INR/Results in MD note/EKG/Type and Cross/Type and Screen

## 2019-04-28 NOTE — H&P ADULT - ASSESSMENT
38yo with hx of severe preeclampsia, HTN, T2DM s/p C/S#3 on 4/20 presenting for severe abdominal pain, concern for intraabdominal bleeding. Differential includes rectus muscle hematoma, uterine incision bleeding, or fascial injury. Will admit to OB service.   -NPO  -IVF  -cross for 2 units PRBC  -general surgery consult   -plan for exploratory laparotomy    Discussed plan of care with Dr. Molina.

## 2019-04-28 NOTE — H&P ADULT - NSHPLABSRESULTS_GEN_ALL_CORE
10.3   18.25 )-----------( 609      ( 2019 22:22 )             32.7           136  |  100  |  11  ----------------------------<  142<H>  4.6   |  19<L>  |  0.99    Ca    8.9      2019 22:22    TPro  7.8  /  Alb  3.4  /  TBili  0.8  /  DBili  x   /  AST  26  /  ALT  17  /  AlkPhos  104        ******PRELIMINARY REPORT******    ******PRELIMINARY REPORT******            EXAM:  CT ABDOMEN AND PELVIS                          PROCEDURE DATE:  2019    ******PRELIMINARY REPORT******    ******PRELIMINARY REPORT******              INTERPRETATION:  CT of the ABDOMEN and PELVIS without intravenous   contrast dated 2019 10:44 PM    INDICATION: Postpartum hemorrhage. History of  one week ago.    TECHNIQUE: CT of the abdomen and pelvis was performed. Intravenous   contrast was not used as requested. Oral contrast was not administered.   Axial, sagittal, and coronal images were produced and reviewed.    PRIOR STUDIES: CT abdomen pelvis 2018    FINDINGS: Images of the lower chest demonstrate grossly clear lungs.   Trace pericardial effusion.    The liver is normal in appearance.   No radiopaque gallstones are seen.    The pancreas is normal in appearance. No splenic abnormalities are seen.    Nodular thickening left adrenal gland. Right adrenal gland unremarkable.   The kidneys are normal in appearance bilaterally. A couple punctate   nonobstructive renal stones bilaterally.  No hydronephrosis is evident.    No abdominal aortic aneurysm is seen. No retroperitoneal lymphadenopathy   is seen.    No bowel abnormalities are seen. There are postsurgical changes in the   anterior abdominal wall inferiorly with a collection of fluid and gas on   the right side measuring roughly 2.6 x 9.9 x 5.3 cm with fat stranding in   the area, possibly representing early abscess.There are bilateral rectus   sheath hematomas in the surgical area, left greater than right, with the   left side measuring up to 5.4 x 7.0 x 7.5 cm. There is a small   communication of hemorrhage from anterior abdominal wall to the   intrapelvic compartment with small layering hemorrhage anterior and   posterior to the uterus. Surgical staples are seen along the anterior   abdomen.    Images of the pelvis demonstrate enlarged uterus with small focus of gas   consistent with recent . No filling defects are seen in the   urinary bladder. No pelvic lymphadenopathy is seen.    Evaluation of the osseous structures demonstrates degenerative changes of   the spine.      IMPRESSION:  Postsurgical changes in the anterior abdominal wall inferiorly with a   collection of fluid and gas on the right side measuring roughly 2.6 x 9.9   x 5.3 cm with fat stranding in the area, possibly representing early   abscess.     Bilateral rectus sheath hematomas, left greater than right, as described   above. Small continuation of hemorrhage into the intrapelvic area.

## 2019-04-28 NOTE — CONSULT NOTE ADULT - ASSESSMENT
39 yo F with history of VHR c/b mesh infection and fistula in 2017 and underwent multiple repairs, recent admission for  on  POD 7, now complicated with possible active bilateral sheath hematomas. Currently BP stable, patient is tachycardic possibly due to anxiety, H/H stable.     Surgery was consulted for a possible intra-op consult.     - May call surgical team/consult surgical team if needed in the OR  - Will go to the OR for exlap today  - Surgery team 4C will follow     plan d/w chief

## 2019-04-28 NOTE — PROGRESS NOTE ADULT - ASSESSMENT
40yo with hx of severe preeclampsia, HTN, T2DM s/p C/S#3 on 4/20 presenting for severe abdominal pain, concern for intraabdominal bleeding, now POD0 s/p exploratory laparotomy and evacuation of hemoperitoneum. Now s/p 2 unites PRBC.  1. Pain: well controlled. d/c dilaudid PCS. toradol ATC, tylenol ATC, oxycodone prn  2. GI: diabetic clears, ADAT  3. : holley, remove at 5pm  4. DVT prophylaxis: Lovenox at 5pm, SCDs, ambulation  5. Endo: ISS  6. Heme: Hgb stable s/p 2u PRBC
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
## 2019-04-29 LAB
GLUCOSE BLDC GLUCOMTR-MCNC: 105 MG/DL — HIGH (ref 70–99)
GLUCOSE BLDC GLUCOMTR-MCNC: 111 MG/DL — HIGH (ref 70–99)
GLUCOSE BLDC GLUCOMTR-MCNC: 117 MG/DL — HIGH (ref 70–99)
GLUCOSE BLDC GLUCOMTR-MCNC: 136 MG/DL — HIGH (ref 70–99)
HCT VFR BLD CALC: 28.2 % — LOW (ref 34.5–45)
HGB BLD-MCNC: 8.7 G/DL — LOW (ref 11.5–15.5)
MCHC RBC-ENTMCNC: 26.7 PG — LOW (ref 27–34)
MCHC RBC-ENTMCNC: 30.9 GM/DL — LOW (ref 32–36)
MCV RBC AUTO: 86.5 FL — SIGNIFICANT CHANGE UP (ref 80–100)
NRBC # BLD: 0 /100 WBCS — SIGNIFICANT CHANGE UP (ref 0–0)
PLATELET # BLD AUTO: 414 K/UL — HIGH (ref 150–400)
RBC # BLD: 3.26 M/UL — LOW (ref 3.8–5.2)
RBC # FLD: 16.3 % — HIGH (ref 10.3–14.5)
WBC # BLD: 14.6 K/UL — HIGH (ref 3.8–10.5)
WBC # FLD AUTO: 14.6 K/UL — HIGH (ref 3.8–10.5)

## 2019-04-29 RX ORDER — DIPHENHYDRAMINE HCL 50 MG
25 CAPSULE ORAL ONCE
Qty: 0 | Refills: 0 | Status: COMPLETED | OUTPATIENT
Start: 2019-04-29 | End: 2019-04-29

## 2019-04-29 RX ORDER — DIPHENHYDRAMINE HCL 50 MG
25 CAPSULE ORAL ONCE
Qty: 0 | Refills: 0 | Status: DISCONTINUED | OUTPATIENT
Start: 2019-04-29 | End: 2019-04-29

## 2019-04-29 RX ORDER — ALBUTEROL 90 UG/1
2 AEROSOL, METERED ORAL EVERY 6 HOURS
Qty: 0 | Refills: 0 | Status: DISCONTINUED | OUTPATIENT
Start: 2019-04-29 | End: 2019-05-08

## 2019-04-29 RX ORDER — SIMETHICONE 80 MG/1
80 TABLET, CHEWABLE ORAL EVERY 8 HOURS
Qty: 0 | Refills: 0 | Status: DISCONTINUED | OUTPATIENT
Start: 2019-04-29 | End: 2019-05-08

## 2019-04-29 RX ORDER — ZINC OXIDE 200 MG/G
1 OINTMENT TOPICAL EVERY 4 HOURS
Qty: 0 | Refills: 0 | Status: DISCONTINUED | OUTPATIENT
Start: 2019-04-29 | End: 2019-05-08

## 2019-04-29 RX ORDER — HYDROMORPHONE HYDROCHLORIDE 2 MG/ML
0.2 INJECTION INTRAMUSCULAR; INTRAVENOUS; SUBCUTANEOUS EVERY 4 HOURS
Qty: 0 | Refills: 0 | Status: DISCONTINUED | OUTPATIENT
Start: 2019-04-29 | End: 2019-05-03

## 2019-04-29 RX ORDER — ZINC OXIDE 200 MG/G
1 OINTMENT TOPICAL EVERY 6 HOURS
Qty: 0 | Refills: 0 | Status: DISCONTINUED | OUTPATIENT
Start: 2019-04-29 | End: 2019-05-08

## 2019-04-29 RX ADMIN — Medication 1000 MILLIGRAM(S): at 16:23

## 2019-04-29 RX ADMIN — Medication 1000 MILLIGRAM(S): at 15:25

## 2019-04-29 RX ADMIN — OXYCODONE HYDROCHLORIDE 10 MILLIGRAM(S): 5 TABLET ORAL at 07:24

## 2019-04-29 RX ADMIN — Medication 25 MILLIGRAM(S): at 00:55

## 2019-04-29 RX ADMIN — SIMETHICONE 80 MILLIGRAM(S): 80 TABLET, CHEWABLE ORAL at 15:24

## 2019-04-29 RX ADMIN — OXYCODONE HYDROCHLORIDE 10 MILLIGRAM(S): 5 TABLET ORAL at 14:10

## 2019-04-29 RX ADMIN — ENOXAPARIN SODIUM 40 MILLIGRAM(S): 100 INJECTION SUBCUTANEOUS at 18:31

## 2019-04-29 RX ADMIN — Medication 600 MILLIGRAM(S): at 12:42

## 2019-04-29 RX ADMIN — Medication 200 MILLIGRAM(S): at 18:07

## 2019-04-29 RX ADMIN — OXYCODONE HYDROCHLORIDE 10 MILLIGRAM(S): 5 TABLET ORAL at 01:30

## 2019-04-29 RX ADMIN — OXYCODONE HYDROCHLORIDE 10 MILLIGRAM(S): 5 TABLET ORAL at 07:57

## 2019-04-29 RX ADMIN — OXYCODONE HYDROCHLORIDE 10 MILLIGRAM(S): 5 TABLET ORAL at 00:55

## 2019-04-29 RX ADMIN — OXYCODONE HYDROCHLORIDE 10 MILLIGRAM(S): 5 TABLET ORAL at 19:55

## 2019-04-29 RX ADMIN — SIMETHICONE 80 MILLIGRAM(S): 80 TABLET, CHEWABLE ORAL at 23:24

## 2019-04-29 RX ADMIN — Medication 600 MILLIGRAM(S): at 13:33

## 2019-04-29 RX ADMIN — Medication 200 MILLIGRAM(S): at 06:13

## 2019-04-29 RX ADMIN — OXYCODONE HYDROCHLORIDE 10 MILLIGRAM(S): 5 TABLET ORAL at 13:33

## 2019-04-29 NOTE — PROGRESS NOTE ADULT - SUBJECTIVE AND OBJECTIVE BOX
Patient evaluated at bedside. She complains of abdominal pain and burning from skin lesions. States that she is having bleeding from her incision, however dressing noted to be clean and dry, removed w/ no bleeding on ABD or Telfa. Has not passed gas but is hungry. Denies nausea and vomiting overnight. She is voiding and ambulating. Denies fevers, chills. States she had intense whole body "itching" after receiving a dose of gentamicin last night. Reports good pain relief from Dilaudid. Has a headache which she attributes to her abdominal pain and just took PO meds for, denies scotoma, RUQ/epigastric pain. Patient is frustrated with being on the 9th floor as she reports they are giving her difficulty with taking care of the baby.    Vital Signs Last 24 Hrs  T(C): 36.7 (29 Apr 2019 04:35), Max: 37 (28 Apr 2019 14:47)  T(F): 98 (29 Apr 2019 04:35), Max: 98.6 (28 Apr 2019 14:47)  HR: 90 (29 Apr 2019 04:35) (82 - 91)  BP: 114/67 (29 Apr 2019 04:35) (114/67 - 154/72)  BP(mean): --  RR: 27 (29 Apr 2019 04:35) (16 - 27)  SpO2: 99% (29 Apr 2019 04:35) (98% - 100%)  GA: NAD, A+0 x 3  Abd: soft, mild tenderness to palpation, nondistended, no rebound or guarding, incision bandaged clean, dry and intact, when removed there is no drainage or bleeding and the incision is well approximated with staples, uterus firm at midline,  below the umbilicus. ABBIE drain with approx 20 cc dark red blood  : lochia WNL  Extremities: no calf tenderness, SCDs in place                          8.7    14.60 )-----------( 414      ( 29 Apr 2019 05:32 )             28.2   04-28    136  |  105  |  9   ----------------------------<  165<H>  4.2   |  19<L>  |  0.82    Ca    8.6      28 Apr 2019 11:06    TPro  6.4  /  Alb  2.3<L>  /  TBili  0.5  /  DBili  x   /  AST  11  /  ALT  11  /  AlkPhos  96  04-28

## 2019-04-29 NOTE — CONSULT NOTE ADULT - ASSESSMENT
will update after rounds 39yoF a PMH of DM, HTN, asthma, CIN3 s/p LEEP , CIN2 s/p LEEP , herna c/b possible incarcerated bowel  s/p mesh placement requiring 3 additional surgeries, admitted for bleeding /  post-op complication .    # DM  - recently evaluated by [facility] endo, discharged from [facility] on glipizide 2.5mg PO once daily  - on moderate ISS currently, requiring 4U yesterday total. 2U for both AM and lunch  - oral intake has been poor, mostly ice chips and liquids  Recommendations  - will update after rounds 39yoF a PMH of DM, HTN, asthma, CIN3 s/p LEEP , CIN2 s/p LEEP , herna c/b possible incarcerated bowel  s/p mesh placement requiring 3 additional surgeries, admitted for bleeding 2/2  post-op complication  now POD#1 ex lap and 2U pRBC's    # DM  - recently evaluated by [facility] endo, discharged from [facility] on glipizide 2.5mg PO once daily  - on moderate ISS currently, requiring 4U yesterday total. 2U for both AM and lunch  - oral intake has been poor, mostly ice chips and liquids  Recommendations  - will update after rounds 39yoF a PMH of DM, HTN, asthma, CIN3 s/p LEEP , CIN2 s/p LEEP , herna c/b possible incarcerated bowel  s/p mesh placement requiring 3 additional surgeries, admitted for bleeding 2/2  post-op complication  now POD#1 ex lap and 2U pRBC's    # DM  - recently evaluated by [facility] endo, discharged from [facility] on glipizide 2.5mg PO once daily  - on moderate ISS currently, requiring 4U yesterday total. 2U for both AM and lunch  - oral intake has been poor, mostly ice chips and liquids  Recommendations  - no lantus or nutritional insulin  - c/w moderate dose ISS  - will continue to follow

## 2019-04-29 NOTE — PROGRESS NOTE ADULT - ASSESSMENT
40yo with hx of severe preeclampsia, HTN, T2DM s/p C/S#3 on 4/20 presenting for severe abdominal pain, concern for intraabdominal bleeding, now POD1 s/p exploratory laparotomy and evacuation of hemoperitoneum, stable  1. Pain: Motrin ATC, tylenol ATC, oxycodone prn, dilaudid prn BT, zinc oxide prn skin blisters  2. GI: diabetic clears, ADAT  3. : voiding  4. DVT prophylaxis: Lovenox,  SCDs, ambulation  5. Endo: ISS. Consult endocrine today for recs.  6. Heme: Hgb stable s/p 2u PRBC  7. Dispo: pending stable clinical status

## 2019-04-29 NOTE — PROGRESS NOTE ADULT - SUBJECTIVE AND OBJECTIVE BOX
Patient evaluated at bedside. She reports mild abdominal pain. She notes blood coming from where ABBIE coming out of skin.   She denies heavy vaginal bleeding.  She has been ambulating without assistance, voiding spontaneously, not passing gas, tolerating clear diet.   Patient reports mild wheezing.    Physical Exam:  Vital Signs Last 24 Hrs  T(C): 37.4 (29 Apr 2019 14:00), Max: 37.4 (29 Apr 2019 14:00)  T(F): 99.3 (29 Apr 2019 14:00), Max: 99.3 (29 Apr 2019 14:00)  HR: 97 (29 Apr 2019 14:00) (86 - 97)  BP: 128/85 (29 Apr 2019 14:00) (114/67 - 138/77)  BP(mean): --  RR: 18 (29 Apr 2019 14:00) (16 - 27)  SpO2: 98% (29 Apr 2019 14:00) (96% - 100%)    GA: NAD, A+0 x 3  Abd: soft, nontender, nondistended, no rebound or guarding, incision clean, dry and intact, uterus firm at midline,  below the umbilicus. skin abrasiona lateral to incision from tape bandage. ABBIE drain with scant serosanguionous fluid.  : lochia WNL  Extremities: no calf tenderness                            8.7    14.60 )-----------( 414      ( 29 Apr 2019 05:32 )             28.2     04-28    136  |  105  |  9   ----------------------------<  165<H>  4.2   |  19<L>  |  0.82    Ca    8.6      28 Apr 2019 11:06    TPro  6.4  /  Alb  2.3<L>  /  TBili  0.5  /  DBili  x   /  AST  11  /  ALT  11  /  AlkPhos  96  04-28      PT/INR - ( 27 Apr 2019 22:22 )   PT: 13.3 sec;   INR: 1.17          PTT - ( 27 Apr 2019 22:22 )  PTT:25.0 sec

## 2019-04-29 NOTE — PROGRESS NOTE ADULT - ASSESSMENT
38yo with hx of severe preeclampsia, HTN, T2DM s/p C/S#3 on 4/20 presenting for severe abdominal pain, concern for intraabdominal bleeding, now POD1 s/p exploratory laparotomy and evacuation of hemoperitoneum, stable  1. Pain: Motrin ATC, tylenol ATC, oxycodone prn, zinc oxide prn skin blisters  2. CV: labetalol 200 BID for HTN. asymptomatic  3. Pulm: albuterol prn for wheezing.  3. GI: diabetic clears, ADAT, simethicone   3. : voiding  4. DVT prophylaxis: Lovenox,  SCDs, ambulation  5. Endo: ISS. Consult endocrine today for recs.  6. Heme: Hgb stable s/p 2u PRBC  7. Dispo: pending stable clinical status

## 2019-04-29 NOTE — CONSULT NOTE ADULT - SUBJECTIVE AND OBJECTIVE BOX
HPI: 39yFemale    Age at Dx:  How dx:  Hx and duration of insulin:  Current Therapy:  Hx of hypoglycemia  Hx of DKA/HHS?    Home FSG:  Fasting  Lunch  Dinner  Bed    Hx of other regimens  Complications:  Outpatient Endo:    PMH & Surgical Hx:RECTUS SHEATHHEMATOMA  No pertinent family history in first degree relatives  Handoff  MEWS Score  Abdominal hernia  Hernia  Obesity  Diabetes  Hyperlipidemia  Essential hypertension  Separation of  wound with drainage, postpartum  Separation of  wound with drainage, postpartum  Rectus sheath hematoma, initial encounter  Exploratory laparotomy  H/O ventral hernia repair  H/O:   History of D&C  H/O LEEP  POST OP COMP  2      FH:  DM:  Thyroid:  Autoimmune:  Other:    SH:  Smoking  Etoh:  Recreational Drugs:  Social Life:    Current Meds:  acetaminophen   Tablet .. 1000 milliGRAM(s) Oral every 8 hours  dextrose 40% Gel 15 Gram(s) Oral once PRN  dextrose 5%. 1000 milliLiter(s) IV Continuous <Continuous>  dextrose 50% Injectable 12.5 Gram(s) IV Push once  dextrose 50% Injectable 25 Gram(s) IV Push once  dextrose 50% Injectable 25 Gram(s) IV Push once  enoxaparin Injectable 40 milliGRAM(s) SubCutaneous every 24 hours  glucagon  Injectable 1 milliGRAM(s) IntraMuscular once PRN  HYDROmorphone  Injectable 0.2 milliGRAM(s) IV Push every 4 hours PRN  ibuprofen  Tablet. 600 milliGRAM(s) Oral every 6 hours  insulin lispro (HumaLOG) corrective regimen sliding scale   SubCutaneous Before meals and at bedtime  labetalol 200 milliGRAM(s) Oral every 12 hours  lactated ringers. 1000 milliLiter(s) IV Continuous <Continuous>  metoclopramide Injectable 10 milliGRAM(s) IV Push every 6 hours PRN  ondansetron Injectable 4 milliGRAM(s) IV Push every 6 hours PRN  oxyCODONE    IR 5 milliGRAM(s) Oral every 4 hours PRN  oxyCODONE    IR 10 milliGRAM(s) Oral every 6 hours PRN  zinc oxide 20% Ointment 1 Application(s) Topical every 6 hours PRN      Allergies:  amoxicillin (Unknown)  iodine containing compounds (Hives; Anaphylaxis)  penicillin (Hives)  shellfish. (Hives; Anaphylaxis)      ROS:  Denies the following except as indicated.    General: weight loss/weight gain, decreased appetite, fatigue  Eyes: Blurry vision, double vision, visual changes  ENT: Throat pain, changes in voice,   CV: palpitations, SOB, CP, cough  GI: NVD, difficulty swallowing, abdominal pain  : polyuria, dysuria  Endo: abnormal menses, temperature intolerance, decreased libido  MSK: weakness, joint pain  Skin: rash, dryness, diaphoresis  Heme: Easy bruising,bleeding  Neuro: HA, dizziness, lightheadedness, numbness tingling  Psych: Anxiety, Depression    Vital Signs Last 24 Hrs  T(C): 37.1 (2019 08:52), Max: 37.1 (2019 08:52)  T(F): 98.7 (2019 08:52), Max: 98.7 (2019 08:52)  HR: 92 (2019 08:52) (83 - 92)  BP: 138/77 (2019 08:52) (114/67 - 154/72)  BP(mean): --  RR: 16 (2019 08:52) (16 - 27)  SpO2: 96% (2019 08:52) (96% - 100%)  Height (cm): 170.18 ( @ 00:46)  Weight (kg): 97.5 ( @ 00:46)  BMI (kg/m2): 33.7 ( @ 00:46)      Constitutional: wn/wd in NAD.   HEENT: NCAT, MMM, OP clear, EOMI, , no proptosis or lid retraction  Neck: no thyromegaly or palpable thyroid nodules   Respiratory: lungs CTAB.  Cardiovascular: regular rhythm, normal S1 and S2, no audible murmurs, no peripheral edema  GI: soft, NT/ND, no masses/HSM appreciated.  Neurology: no tremors, DTR 2+  Skin: no visible rashes/lesions  Psychiatric: AAO x 3, normal affect/mood.  Ext: radial pulses intact, DP pulses intact, extremities warm, no cyanosis, clubbing or edema.       LABS:                        8.7    14.60 )-----------( 414      ( 2019 05:32 )             28.2         136  |  105  |  9   ----------------------------<  165<H>  4.2   |  19<L>  |  0.82    Ca    8.6      2019 11:06    TPro  6.4  /  Alb  2.3<L>  /  TBili  0.5  /  DBili  x   /  AST  11  /  ALT  11  /  AlkPhos  96  -    PT/INR - ( 2019 22:22 )   PT: 13.3 sec;   INR: 1.17          PTT - ( 2019 22:22 )  PTT:25.0 sec  Urinalysis Basic - ( 2019 00:27 )    Color: Yellow / Appearance: Clear / S.015 / pH: x  Gluc: x / Ketone: NEGATIVE  / Bili: Negative / Urobili: 0.2 E.U./dL   Blood: x / Protein: 30 mg/dL / Nitrite: NEGATIVE   Leuk Esterase: Moderate / RBC: 5-10 /HPF / WBC > 10 /HPF   Sq Epi: x / Non Sq Epi: 5-10 /HPF / Bacteria: Present /HPF      Hemoglobin A1C, Whole Blood: 6.0 ( @ 11:31)        RADIOLOGY & ADDITIONAL STUDIES:  CAPILLARY BLOOD GLUCOSE      POCT Blood Glucose.: 111 mg/dL (2019 06:59)  POCT Blood Glucose.: 118 mg/dL (2019 22:18)        A/P:39y Female    1.  DM  Please continue lantus       units at night / morning.  Please continue lispro      units before each meal.  Please continue lispro moderate / low dose sliding scale four times daily with meals and at bedtime    Pt's fingerstick glucose goal is     Will continue to monitor     For discharge, pt can continue    Pt can follow up at discharge with [facility] by calling  to make an appointment.   Will discuss case with     and update primary team 39yoF a PMH of DM, HTN, asthma, CIN3 s/p LEEP , CIN2 s/p LEEP , herna c/b possible incarcerated bowel 2017 s/p mesh placement requiring 3 additional surgeries, admitted for bleeding 2/2  post-op complication . Was recently evaluated by [facility] endo during pt's last admission. She was discharged with glypizide 2.5mg PO once daily. Due to recent surgery and nausea from her previous hernia mesh procedures her PO intake has been poor, mostly ice chips and fluids. DM was diagnosed in , metformin started in  1000mg PO BID. Pt states she was losing weight rapidly roughly 60lb, during the workup she was found to be diabetic. Currently gets her Rx from a pediatrician. Does not have a podiatrist or ophthalmologist. Denies vision problems or peripheral neuropathy symptoms. Was on insulin briefly for last 2mo of her pregnancy but otherwise only on orals.         Age at Dx: 25  How dx: wt loss  Hx and duration of insulin: 2mo last 2mo of pregnancy  Current Therapy: glipizide 2.5mg once daily  Hx of hypoglycemia  Hx of DKA/HHS?    Home FS's to 300's in AM  Fasting  Lunch  Dinner  Bed    Hx of other regimens  Complications:  Outpatient Endo:    PMH & Surgical Hx:RECTUS SHEATHHEMATOMA  No pertinent family history in first degree relatives  Handoff  MEWS Score  Abdominal hernia  Hernia  Obesity  Diabetes  Hyperlipidemia  Essential hypertension  Separation of  wound with drainage, postpartum  Separation of  wound with drainage, postpartum  Rectus sheath hematoma, initial encounter  Exploratory laparotomy  H/O ventral hernia repair  H/O:   History of D&C  H/O LEEP  POST OP COMP  2    FH:  DM: both parents  Thyroid: mom hypothryoid  Autoimmune: none  Other: none    SH:  Smokinppd for 20yrs, quit smoking  Etoh: socially  Recreational Drugs: none  Social Life: lives with /children in [place]    Current Meds:  acetaminophen   Tablet .. 1000 milliGRAM(s) Oral every 8 hours  dextrose 40% Gel 15 Gram(s) Oral once PRN  dextrose 5%. 1000 milliLiter(s) IV Continuous <Continuous>  dextrose 50% Injectable 12.5 Gram(s) IV Push once  dextrose 50% Injectable 25 Gram(s) IV Push once  dextrose 50% Injectable 25 Gram(s) IV Push once  enoxaparin Injectable 40 milliGRAM(s) SubCutaneous every 24 hours  glucagon  Injectable 1 milliGRAM(s) IntraMuscular once PRN  HYDROmorphone  Injectable 0.2 milliGRAM(s) IV Push every 4 hours PRN  ibuprofen  Tablet. 600 milliGRAM(s) Oral every 6 hours  insulin lispro (HumaLOG) corrective regimen sliding scale   SubCutaneous Before meals and at bedtime  labetalol 200 milliGRAM(s) Oral every 12 hours  lactated ringers. 1000 milliLiter(s) IV Continuous <Continuous>  metoclopramide Injectable 10 milliGRAM(s) IV Push every 6 hours PRN  ondansetron Injectable 4 milliGRAM(s) IV Push every 6 hours PRN  oxyCODONE    IR 5 milliGRAM(s) Oral every 4 hours PRN  oxyCODONE    IR 10 milliGRAM(s) Oral every 6 hours PRN  zinc oxide 20% Ointment 1 Application(s) Topical every 6 hours PRN  zinc oxide 40% Ointment 1 Application(s) Topical every 4 hours PRN      Allergies:  amoxicillin (Unknown)  iodine containing compounds (Hives; Anaphylaxis)  penicillin (Hives)  shellfish. (Hives; Anaphylaxis)      ROS:  Denies the following except as indicated.    General: weight loss/weight gain, decreased appetite, fatigue  Eyes: Blurry vision, double vision, visual changes  ENT: Throat pain, changes in voice,   CV: palpitations, SOB, CP, cough  GI: NVD, difficulty swallowing. +nausea and abd pain  : polyuria, dysuria  Endo: abnormal menses, temperature intolerance, decreased libido  MSK: weakness, joint pain  Skin: rash, dryness, diaphoresis  Heme: Easy bruising,bleeding  Neuro: HA, dizziness, lightheadedness, numbness tingling  Psych: Anxiety, Depression    Vital Signs Last 24 Hrs  T(C): 36.8 (2019 11:35), Max: 37.1 (2019 08:52)  T(F): 98.2 (2019 11:35), Max: 98.7 (2019 08:52)  HR: 90 (2019 11:35) (83 - 92)  BP: 120/76 (2019 11:35) (114/67 - 154/72)  BP(mean): --  RR: 18 (2019 11:35) (16 - 27)  SpO2: 97% (2019 11:35) (96% - 100%)  Height (cm): 170.18 ( @ 00:46)  Weight (kg): 97.5 ( @ 00:46)  BMI (kg/m2): 33.7 ( @ 00:46)      Constitutional: wn/wd in NAD.   HEENT: NCAT, MMM, OP clear, EOMI, , no proptosis or lid retraction  Neck: no thyromegaly or palpable thyroid nodules   Respiratory: lungs CTAB.  Cardiovascular: regular rhythm, normal S1 and S2, no audible murmurs, no peripheral edema  GI: soft, NT/ND, no masses/HSM appreciated. +ABBIE drain in place draining bloody discharge  Neurology: no tremors, DTR 2+  Skin: no visible rashes/lesions  Psychiatric: AAO x 3, normal affect/mood.  Ext: radial pulses intact, DP pulses intact, extremities warm, no cyanosis, clubbing or edema.       LABS:                        8.7    14.60 )-----------( 414      ( 2019 05:32 )             28.2         136  |  105  |  9   ----------------------------<  165<H>  4.2   |  19<L>  |  0.82    Ca    8.6      2019 11:06    TPro  6.4  /  Alb  2.3<L>  /  TBili  0.5  /  DBili  x   /  AST  11  /  ALT  11  /  AlkPhos  96      PT/INR - ( 2019 22:22 )   PT: 13.3 sec;   INR: 1.17          PTT - ( 2019 22:22 )  PTT:25.0 sec  Urinalysis Basic - ( 2019 00:27 )    Color: Yellow / Appearance: Clear / S.015 / pH: x  Gluc: x / Ketone: NEGATIVE  / Bili: Negative / Urobili: 0.2 E.U./dL   Blood: x / Protein: 30 mg/dL / Nitrite: NEGATIVE   Leuk Esterase: Moderate / RBC: 5-10 /HPF / WBC > 10 /HPF   Sq Epi: x / Non Sq Epi: 5-10 /HPF / Bacteria: Present /HPF      Hemoglobin A1C, Whole Blood: 6.0 ( @ 11:31)        RADIOLOGY & ADDITIONAL STUDIES:  CAPILLARY BLOOD GLUCOSE      POCT Blood Glucose.: 105 mg/dL (2019 12:46)  POCT Blood Glucose.: 111 mg/dL (2019 06:59)  POCT Blood Glucose.: 118 mg/dL (2019 22:18)        A/P:39y Female    1.  DM  Please continue lantus       units at night / morning.  Please continue lispro      units before each meal.  Please continue lispro moderate / low dose sliding scale four times daily with meals and at bedtime    Pt's fingerstick glucose goal is     Will continue to monitor     For discharge, pt can continue    Pt can follow up at discharge with [facility] by calling  to make an appointment.   Will discuss case with     and update primary team 39yoF a PMH of DM, HTN, asthma, CIN3 s/p LEEP , CIN2 s/p LEEP , herna c/b possible incarcerated bowel 2017 s/p mesh placement requiring 3 additional surgeries, admitted for bleeding 2/2  post-op complication  now POD#1 ex lap and 2U pRBC's. Was recently evaluated by [facility] endo during pt's last admission. She was discharged with glypizide 2.5mg PO once daily. Due to recent surgery and nausea from her previous hernia mesh procedures her PO intake has been poor, mostly ice chips and fluids. DM was diagnosed in , metformin started in  1000mg PO BID. Pt states she was losing weight rapidly roughly 60lb, during the workup she was found to be diabetic. Currently gets her Rx from a pediatrician. Does not have a podiatrist or ophthalmologist. Denies vision problems or peripheral neuropathy symptoms. Was on insulin briefly for last 2mo of her pregnancy but otherwise only on orals.         Age at Dx: 25  How dx: wt loss  Hx and duration of insulin: 2mo last 2mo of pregnancy  Current Therapy: glipizide 2.5mg once daily  Hx of hypoglycemia  Hx of DKA/HHS?    Home FS's to 300's in AM  Fasting  Lunch  Dinner  Bed    Hx of other regimens  Complications:  Outpatient Endo:    PMH & Surgical Hx:RECTUS SHEATHHEMATOMA  No pertinent family history in first degree relatives  Handoff  MEWS Score  Abdominal hernia  Hernia  Obesity  Diabetes  Hyperlipidemia  Essential hypertension  Separation of  wound with drainage, postpartum  Separation of  wound with drainage, postpartum  Rectus sheath hematoma, initial encounter  Exploratory laparotomy  H/O ventral hernia repair  H/O:   History of D&C  H/O LEEP  POST OP COMP  2    FH:  DM: both parents  Thyroid: mom hypothryoid  Autoimmune: none  Other: none    SH:  Smokinppd for 20yrs, quit smoking  Etoh: socially  Recreational Drugs: none  Social Life: lives with /children in [place]    Current Meds:  acetaminophen   Tablet .. 1000 milliGRAM(s) Oral every 8 hours  dextrose 40% Gel 15 Gram(s) Oral once PRN  dextrose 5%. 1000 milliLiter(s) IV Continuous <Continuous>  dextrose 50% Injectable 12.5 Gram(s) IV Push once  dextrose 50% Injectable 25 Gram(s) IV Push once  dextrose 50% Injectable 25 Gram(s) IV Push once  enoxaparin Injectable 40 milliGRAM(s) SubCutaneous every 24 hours  glucagon  Injectable 1 milliGRAM(s) IntraMuscular once PRN  HYDROmorphone  Injectable 0.2 milliGRAM(s) IV Push every 4 hours PRN  ibuprofen  Tablet. 600 milliGRAM(s) Oral every 6 hours  insulin lispro (HumaLOG) corrective regimen sliding scale   SubCutaneous Before meals and at bedtime  labetalol 200 milliGRAM(s) Oral every 12 hours  lactated ringers. 1000 milliLiter(s) IV Continuous <Continuous>  metoclopramide Injectable 10 milliGRAM(s) IV Push every 6 hours PRN  ondansetron Injectable 4 milliGRAM(s) IV Push every 6 hours PRN  oxyCODONE    IR 5 milliGRAM(s) Oral every 4 hours PRN  oxyCODONE    IR 10 milliGRAM(s) Oral every 6 hours PRN  zinc oxide 20% Ointment 1 Application(s) Topical every 6 hours PRN  zinc oxide 40% Ointment 1 Application(s) Topical every 4 hours PRN      Allergies:  amoxicillin (Unknown)  iodine containing compounds (Hives; Anaphylaxis)  penicillin (Hives)  shellfish. (Hives; Anaphylaxis)      ROS:  Denies the following except as indicated.    General: weight loss/weight gain, decreased appetite, fatigue  Eyes: Blurry vision, double vision, visual changes  ENT: Throat pain, changes in voice,   CV: palpitations, SOB, CP, cough  GI: NVD, difficulty swallowing. +nausea and abd pain  : polyuria, dysuria  Endo: abnormal menses, temperature intolerance, decreased libido  MSK: weakness, joint pain  Skin: rash, dryness, diaphoresis  Heme: Easy bruising,bleeding  Neuro: HA, dizziness, lightheadedness, numbness tingling  Psych: Anxiety, Depression    Vital Signs Last 24 Hrs  T(C): 36.8 (2019 11:35), Max: 37.1 (2019 08:52)  T(F): 98.2 (2019 11:35), Max: 98.7 (2019 08:52)  HR: 90 (2019 11:35) (83 - 92)  BP: 120/76 (2019 11:35) (114/67 - 154/72)  BP(mean): --  RR: 18 (2019 11:35) (16 - 27)  SpO2: 97% (2019 11:35) (96% - 100%)  Height (cm): 170.18 ( @ 00:46)  Weight (kg): 97.5 ( @ 00:46)  BMI (kg/m2): 33.7 ( @ 00:46)      Constitutional: wn/wd in NAD.   HEENT: NCAT, MMM, OP clear, EOMI, , no proptosis or lid retraction  Neck: no thyromegaly or palpable thyroid nodules   Respiratory: lungs CTAB.  Cardiovascular: regular rhythm, normal S1 and S2, no audible murmurs, no peripheral edema  GI: soft, NT/ND, no masses/HSM appreciated. +ABBIE drain in place draining bloody discharge  Neurology: no tremors, DTR 2+  Skin: no visible rashes/lesions  Psychiatric: AAO x 3, normal affect/mood.  Ext: radial pulses intact, DP pulses intact, extremities warm, no cyanosis, clubbing or edema.       LABS:                        8.7    14.60 )-----------( 414      ( 2019 05:32 )             28.2         136  |  105  |  9   ----------------------------<  165<H>  4.2   |  19<L>  |  0.82    Ca    8.6      2019 11:06    TPro  6.4  /  Alb  2.3<L>  /  TBili  0.5  /  DBili  x   /  AST  11  /  ALT  11  /  AlkPhos  96      PT/INR - ( 2019 22:22 )   PT: 13.3 sec;   INR: 1.17          PTT - ( 2019 22:22 )  PTT:25.0 sec  Urinalysis Basic - ( 2019 00:27 )    Color: Yellow / Appearance: Clear / S.015 / pH: x  Gluc: x / Ketone: NEGATIVE  / Bili: Negative / Urobili: 0.2 E.U./dL   Blood: x / Protein: 30 mg/dL / Nitrite: NEGATIVE   Leuk Esterase: Moderate / RBC: 5-10 /HPF / WBC > 10 /HPF   Sq Epi: x / Non Sq Epi: 5-10 /HPF / Bacteria: Present /HPF      Hemoglobin A1C, Whole Blood: 6.0 ( @ 11:31)        RADIOLOGY & ADDITIONAL STUDIES:  CAPILLARY BLOOD GLUCOSE      POCT Blood Glucose.: 105 mg/dL (2019 12:46)  POCT Blood Glucose.: 111 mg/dL (2019 06:59)  POCT Blood Glucose.: 118 mg/dL (2019 22:18)        A/P:39y Female    1.  DM  Please continue lantus       units at night / morning.  Please continue lispro      units before each meal.  Please continue lispro moderate / low dose sliding scale four times daily with meals and at bedtime    Pt's fingerstick glucose goal is     Will continue to monitor     For discharge, pt can continue    Pt can follow up at discharge with [facility] by calling  to make an appointment.   Will discuss case with     and update primary team 39yoF a PMH of DM, HTN, asthma, CIN3 s/p LEEP , CIN2 s/p LEEP , herna c/b possible incarcerated bowel 2017 s/p mesh placement requiring 3 additional surgeries, admitted for bleeding 2/2  post-op complication  now POD#1 ex lap and 2U pRBC's. Was recently evaluated by [facility] endo during pt's last admission. She was discharged with glypizide 2.5mg PO once daily. Due to recent surgery and nausea from her previous hernia mesh procedures her PO intake has been poor, mostly ice chips and fluids. DM was diagnosed in , metformin started in  1000mg PO BID. Pt states she was losing weight rapidly roughly 60lb, during the workup she was found to be diabetic. Currently gets her Rx from a pediatrician. Does not have a podiatrist or ophthalmologist. Denies vision problems or peripheral neuropathy symptoms. Was on insulin briefly for last 2mo of her pregnancy but otherwise only on orals.         Age at Dx: 25  How dx: wt loss  Hx and duration of insulin: 2mo last 2mo of pregnancy  Current Therapy: glipizide 2.5mg once daily  Hx of hypoglycemia  Hx of DKA/HHS?    Home FS's to 300's in AM  Fasting  Lunch  Dinner  Bed    Hx of other regimens  Complications:  Outpatient Endo:    PMH & Surgical Hx:RECTUS SHEATHHEMATOMA  No pertinent family history in first degree relatives  Handoff  MEWS Score  Abdominal hernia  Hernia  Obesity  Diabetes  Hyperlipidemia  Essential hypertension  Separation of  wound with drainage, postpartum  Separation of  wound with drainage, postpartum  Rectus sheath hematoma, initial encounter  Exploratory laparotomy  H/O ventral hernia repair  H/O:   History of D&C  H/O LEEP  POST OP COMP  2    FH:  DM: both parents  Thyroid: mom hypothryoid  Autoimmune: none  Other: none    SH:  Smokinppd for 20yrs, quit smoking  Etoh: socially  Recreational Drugs: none  Social Life: lives with /children in [place]    Current Meds:  acetaminophen   Tablet .. 1000 milliGRAM(s) Oral every 8 hours  dextrose 40% Gel 15 Gram(s) Oral once PRN  dextrose 5%. 1000 milliLiter(s) IV Continuous <Continuous>  dextrose 50% Injectable 12.5 Gram(s) IV Push once  dextrose 50% Injectable 25 Gram(s) IV Push once  dextrose 50% Injectable 25 Gram(s) IV Push once  enoxaparin Injectable 40 milliGRAM(s) SubCutaneous every 24 hours  glucagon  Injectable 1 milliGRAM(s) IntraMuscular once PRN  HYDROmorphone  Injectable 0.2 milliGRAM(s) IV Push every 4 hours PRN  ibuprofen  Tablet. 600 milliGRAM(s) Oral every 6 hours  insulin lispro (HumaLOG) corrective regimen sliding scale   SubCutaneous Before meals and at bedtime  labetalol 200 milliGRAM(s) Oral every 12 hours  lactated ringers. 1000 milliLiter(s) IV Continuous <Continuous>  metoclopramide Injectable 10 milliGRAM(s) IV Push every 6 hours PRN  ondansetron Injectable 4 milliGRAM(s) IV Push every 6 hours PRN  oxyCODONE    IR 5 milliGRAM(s) Oral every 4 hours PRN  oxyCODONE    IR 10 milliGRAM(s) Oral every 6 hours PRN  zinc oxide 20% Ointment 1 Application(s) Topical every 6 hours PRN  zinc oxide 40% Ointment 1 Application(s) Topical every 4 hours PRN      Allergies:  amoxicillin (Unknown)  iodine containing compounds (Hives; Anaphylaxis)  penicillin (Hives)  shellfish. (Hives; Anaphylaxis)      ROS:  Denies the following except as indicated.    General: weight loss/weight gain, decreased appetite, fatigue  Eyes: Blurry vision, double vision, visual changes  ENT: Throat pain, changes in voice,   CV: palpitations, SOB, CP, cough  GI: NVD, difficulty swallowing. +nausea and abd pain  : polyuria, dysuria  Endo: abnormal menses, temperature intolerance, decreased libido  MSK: weakness, joint pain  Skin: rash, dryness, diaphoresis  Heme: Easy bruising,bleeding  Neuro: HA, dizziness, lightheadedness, numbness tingling  Psych: Anxiety, Depression    Vital Signs Last 24 Hrs  T(C): 36.8 (2019 11:35), Max: 37.1 (2019 08:52)  T(F): 98.2 (2019 11:35), Max: 98.7 (2019 08:52)  HR: 90 (2019 11:35) (83 - 92)  BP: 120/76 (2019 11:35) (114/67 - 154/72)  BP(mean): --  RR: 18 (2019 11:35) (16 - 27)  SpO2: 97% (2019 11:35) (96% - 100%)  Height (cm): 170.18 ( @ 00:46)  Weight (kg): 97.5 ( @ 00:46)  BMI (kg/m2): 33.7 ( @ 00:46)      Constitutional: wn/wd in NAD.   HEENT: NCAT, MMM, OP clear, EOMI, , no proptosis or lid retraction  Neck: no thyromegaly or palpable thyroid nodules   Respiratory: lungs CTAB.  Cardiovascular: regular rhythm, normal S1 and S2, no audible murmurs, no peripheral edema  GI: soft, NT/ND, no masses/HSM appreciated. +ABBIE drain in place draining bloody discharge  Neurology: no tremors, DTR 2+  Skin: no visible rashes/lesions  Psychiatric: AAO x 3, normal affect/mood.  Ext: radial pulses intact, DP pulses intact, extremities warm, no cyanosis, clubbing or edema.       LABS:                        8.7    14.60 )-----------( 414      ( 2019 05:32 )             28.2         136  |  105  |  9   ----------------------------<  165<H>  4.2   |  19<L>  |  0.82    Ca    8.6      2019 11:06    TPro  6.4  /  Alb  2.3<L>  /  TBili  0.5  /  DBili  x   /  AST  11  /  ALT  11  /  AlkPhos  96      PT/INR - ( 2019 22:22 )   PT: 13.3 sec;   INR: 1.17          PTT - ( 2019 22:22 )  PTT:25.0 sec  Urinalysis Basic - ( 2019 00:27 )    Color: Yellow / Appearance: Clear / S.015 / pH: x  Gluc: x / Ketone: NEGATIVE  / Bili: Negative / Urobili: 0.2 E.U./dL   Blood: x / Protein: 30 mg/dL / Nitrite: NEGATIVE   Leuk Esterase: Moderate / RBC: 5-10 /HPF / WBC > 10 /HPF   Sq Epi: x / Non Sq Epi: 5-10 /HPF / Bacteria: Present /HPF      Hemoglobin A1C, Whole Blood: 6.0 ( @ 11:31)        RADIOLOGY & ADDITIONAL STUDIES:  CAPILLARY BLOOD GLUCOSE      POCT Blood Glucose.: 105 mg/dL (2019 12:46)  POCT Blood Glucose.: 111 mg/dL (2019 06:59)  POCT Blood Glucose.: 118 mg/dL (2019 22:18)

## 2019-04-30 LAB
BASOPHILS # BLD AUTO: 0.03 K/UL — SIGNIFICANT CHANGE UP (ref 0–0.2)
BASOPHILS NFR BLD AUTO: 0.3 % — SIGNIFICANT CHANGE UP (ref 0–2)
EOSINOPHIL # BLD AUTO: 0.17 K/UL — SIGNIFICANT CHANGE UP (ref 0–0.5)
EOSINOPHIL NFR BLD AUTO: 1.6 % — SIGNIFICANT CHANGE UP (ref 0–6)
GLUCOSE BLDC GLUCOMTR-MCNC: 104 MG/DL — HIGH (ref 70–99)
GLUCOSE BLDC GLUCOMTR-MCNC: 106 MG/DL — HIGH (ref 70–99)
GLUCOSE BLDC GLUCOMTR-MCNC: 128 MG/DL — HIGH (ref 70–99)
GLUCOSE BLDC GLUCOMTR-MCNC: 150 MG/DL — HIGH (ref 70–99)
HCT VFR BLD CALC: 28.3 % — LOW (ref 34.5–45)
HGB BLD-MCNC: 8.8 G/DL — LOW (ref 11.5–15.5)
IMM GRANULOCYTES NFR BLD AUTO: 1 % — SIGNIFICANT CHANGE UP (ref 0–1.5)
LYMPHOCYTES # BLD AUTO: 18.5 % — SIGNIFICANT CHANGE UP (ref 13–44)
LYMPHOCYTES # BLD AUTO: 2.03 K/UL — SIGNIFICANT CHANGE UP (ref 1–3.3)
MCHC RBC-ENTMCNC: 27.1 PG — SIGNIFICANT CHANGE UP (ref 27–34)
MCHC RBC-ENTMCNC: 31.1 GM/DL — LOW (ref 32–36)
MCV RBC AUTO: 87.1 FL — SIGNIFICANT CHANGE UP (ref 80–100)
MONOCYTES # BLD AUTO: 1.16 K/UL — HIGH (ref 0–0.9)
MONOCYTES NFR BLD AUTO: 10.6 % — SIGNIFICANT CHANGE UP (ref 2–14)
NEUTROPHILS # BLD AUTO: 7.46 K/UL — HIGH (ref 1.8–7.4)
NEUTROPHILS NFR BLD AUTO: 68 % — SIGNIFICANT CHANGE UP (ref 43–77)
NRBC # BLD: 0 /100 WBCS — SIGNIFICANT CHANGE UP (ref 0–0)
PLATELET # BLD AUTO: 455 K/UL — HIGH (ref 150–400)
RBC # BLD: 3.25 M/UL — LOW (ref 3.8–5.2)
RBC # FLD: 16.4 % — HIGH (ref 10.3–14.5)
WBC # BLD: 10.96 K/UL — HIGH (ref 3.8–10.5)
WBC # FLD AUTO: 10.96 K/UL — HIGH (ref 3.8–10.5)

## 2019-04-30 PROCEDURE — 93970 EXTREMITY STUDY: CPT | Mod: 26

## 2019-04-30 RX ORDER — IPRATROPIUM/ALBUTEROL SULFATE 18-103MCG
3 AEROSOL WITH ADAPTER (GRAM) INHALATION EVERY 6 HOURS
Qty: 0 | Refills: 0 | Status: DISCONTINUED | OUTPATIENT
Start: 2019-04-30 | End: 2019-05-08

## 2019-04-30 RX ORDER — DIPHENHYDRAMINE HCL 50 MG
50 CAPSULE ORAL EVERY 4 HOURS
Qty: 0 | Refills: 0 | Status: DISCONTINUED | OUTPATIENT
Start: 2019-04-30 | End: 2019-05-03

## 2019-04-30 RX ADMIN — Medication 1000 MILLIGRAM(S): at 17:00

## 2019-04-30 RX ADMIN — Medication 200 MILLIGRAM(S): at 06:52

## 2019-04-30 RX ADMIN — SIMETHICONE 80 MILLIGRAM(S): 80 TABLET, CHEWABLE ORAL at 17:14

## 2019-04-30 RX ADMIN — Medication 200 MILLIGRAM(S): at 19:20

## 2019-04-30 RX ADMIN — OXYCODONE HYDROCHLORIDE 5 MILLIGRAM(S): 5 TABLET ORAL at 21:50

## 2019-04-30 RX ADMIN — OXYCODONE HYDROCHLORIDE 10 MILLIGRAM(S): 5 TABLET ORAL at 07:48

## 2019-04-30 RX ADMIN — SIMETHICONE 80 MILLIGRAM(S): 80 TABLET, CHEWABLE ORAL at 07:46

## 2019-04-30 RX ADMIN — OXYCODONE HYDROCHLORIDE 10 MILLIGRAM(S): 5 TABLET ORAL at 02:03

## 2019-04-30 RX ADMIN — OXYCODONE HYDROCHLORIDE 10 MILLIGRAM(S): 5 TABLET ORAL at 03:03

## 2019-04-30 RX ADMIN — Medication 600 MILLIGRAM(S): at 19:21

## 2019-04-30 RX ADMIN — OXYCODONE HYDROCHLORIDE 10 MILLIGRAM(S): 5 TABLET ORAL at 08:53

## 2019-04-30 RX ADMIN — OXYCODONE HYDROCHLORIDE 5 MILLIGRAM(S): 5 TABLET ORAL at 21:02

## 2019-04-30 RX ADMIN — OXYCODONE HYDROCHLORIDE 10 MILLIGRAM(S): 5 TABLET ORAL at 13:44

## 2019-04-30 RX ADMIN — Medication 3 MILLILITER(S): at 16:08

## 2019-04-30 RX ADMIN — Medication 3 MILLILITER(S): at 22:23

## 2019-04-30 RX ADMIN — Medication 600 MILLIGRAM(S): at 20:21

## 2019-04-30 RX ADMIN — Medication 1000 MILLIGRAM(S): at 16:07

## 2019-04-30 RX ADMIN — OXYCODONE HYDROCHLORIDE 10 MILLIGRAM(S): 5 TABLET ORAL at 14:30

## 2019-04-30 NOTE — PROGRESS NOTE ADULT - ASSESSMENT
40yo with hx of severe preeclampsia, HTN, T2DM s/p C/S#3 on 4/20 presenting for severe abdominal pain, concern for intraabdominal bleeding, now POD2 s/p exploratory laparotomy and evacuation of hemoperitoneum, stable  1. Pain: Motrin ATC, tylenol ATC, oxycodone prn, zinc oxide prn skin blisters  2. CV: labetalol 200 BID for HTN. asymptomatic  3. Pulm: albuterol prn for wheezing.  3. GI: diabetic clears, ADAT, simethicone   3. : voiding  4. DVT prophylaxis: Lovenox,  SCDs, ambulation  5. Endo: continue moderate ISS per endocrine  6. Heme: Hgb stable s/p 2u PRBC  7. Dispo: pending stable clinical status

## 2019-04-30 NOTE — PROGRESS NOTE ADULT - ASSESSMENT
39yoF a PMH of DM, HTN, asthma, CIN3 s/p LEEP , CIN2 s/p LEEP , herna c/b possible incarcerated bowel  s/p mesh placement requiring 3 additional surgeries, admitted for bleeding 2/2  post-op complication  now POD#1 ex lap and 2U pRBC's    # DM  - recently evaluated by [facility] endo, discharged from [facility] on glipizide 2.5mg PO once daily  - on moderate ISS currently, requiring 4U yesterday total. 2U for both AM and lunch  - oral intake has been poor, mostly ice chips and liquids  Recommendations  - will update after rounds 39yoF a PMH of DM, HTN, asthma, CIN3 s/p LEEP , CIN2 s/p LEEP , herna c/b possible incarcerated bowel  s/p mesh placement requiring 3 additional surgeries, admitted for bleeding 2/2  post-op complication  now POD#1 ex lap and 2U pRBC's    # DM  - recently evaluated by [facility] endo, discharged from [facility] on glipizide 2.5mg PO once daily  - on moderate ISS currently, requiring 4U yesterday total. 2U for both AM and lunch  - oral intake has been poor, mostly ice chips and liquids  Recommendations  - c/w sliding scale  - will continue to follow

## 2019-04-30 NOTE — PROGRESS NOTE ADULT - SUBJECTIVE AND OBJECTIVE BOX
Patient evaluated at bedside. Patient reports overnight she experienced bleeding from the abdominal incision, resolved after evaluation by resident. She reports no bleeding this morning. She reports pain is well controlled. She denies heavy vaginal bleeding.  She has been ambulating without assistance, voiding spontaneously, passing gas, tolerating clear diet.    Physical Exam:  Vital Signs Last 24 Hrs  T(C): 36.6 (30 Apr 2019 06:15), Max: 37.4 (29 Apr 2019 14:00)  T(F): 97.8 (30 Apr 2019 06:15), Max: 99.3 (29 Apr 2019 14:00)  HR: 91 (30 Apr 2019 06:15) (90 - 97)  BP: 135/83 (30 Apr 2019 06:15) (120/76 - 138/77)  BP(mean): --  RR: 18 (30 Apr 2019 06:15) (16 - 18)  SpO2: 100% (30 Apr 2019 06:15) (96% - 100%)    GA: NAD, A+0 x 3  Abd: soft, nontender, nondistended, no rebound or guarding, incision clean, dry and intact, uterus firm at midline,  below the umbilicus, ABBIE drain with scant dark serousanguinous fluid.  : lochia WNL  Extremities: no calf tenderness                            8.8    10.96 )-----------( 455      ( 30 Apr 2019 05:54 )             28.3     04-28    136  |  105  |  9   ----------------------------<  165<H>  4.2   |  19<L>  |  0.82    Ca    8.6      28 Apr 2019 11:06    TPro  6.4  /  Alb  2.3<L>  /  TBili  0.5  /  DBili  x   /  AST  11  /  ALT  11  /  AlkPhos  96  04-28 Patient evaluated at bedside. Patient reports overnight she experienced bleeding from the abdominal incision, resolved after evaluation by resident. She reports no bleeding this morning. She reports pain is well controlled. She denies heavy vaginal bleeding.  She has been ambulating without assistance, voiding spontaneously, passing gas, tolerating clear diet.    Physical Exam:  Vital Signs Last 24 Hrs  T(C): 36.6 (30 Apr 2019 06:15), Max: 37.4 (29 Apr 2019 14:00)  T(F): 97.8 (30 Apr 2019 06:15), Max: 99.3 (29 Apr 2019 14:00)  HR: 91 (30 Apr 2019 06:15) (90 - 97)  BP: 135/83 (30 Apr 2019 06:15) (120/76 - 138/77)  BP(mean): --  RR: 18 (30 Apr 2019 06:15) (16 - 18)  SpO2: 100% (30 Apr 2019 06:15) (96% - 100%)    GA: NAD, A+0 x 3  Abd: Obese, soft, nontender, nondistended, no rebound or guarding, incision clean, with staples. Serosangious fluid running from between staples. ABBIE drain with scant dark serousanguinous fluid.  : lochia WNL  Extremities: no calf tenderness                            8.8    10.96 )-----------( 455      ( 30 Apr 2019 05:54 )             28.3     04-28    136  |  105  |  9   ----------------------------<  165<H>  4.2   |  19<L>  |  0.82    Ca    8.6      28 Apr 2019 11:06    TPro  6.4  /  Alb  2.3<L>  /  TBili  0.5  /  DBili  x   /  AST  11  /  ALT  11  /  AlkPhos  96  04-28

## 2019-04-30 NOTE — PROGRESS NOTE ADULT - SUBJECTIVE AND OBJECTIVE BOX
INTERVAL HPI/OVERNIGHT EVENTS: FAUSTINO    Patient is a 39y old  Female who presents with a chief complaint of wound complication (30 Apr 2019 06:45)      Pt reports the following symptoms: She has had poor oral intake d/t nausea and post-op pain.     MEDICATIONS  (STANDING):  acetaminophen   Tablet .. 1000 milliGRAM(s) Oral every 8 hours  ALBUTerol/ipratropium for Nebulization 3 milliLiter(s) Nebulizer every 6 hours  dextrose 5%. 1000 milliLiter(s) (50 mL/Hr) IV Continuous <Continuous>  dextrose 50% Injectable 12.5 Gram(s) IV Push once  dextrose 50% Injectable 25 Gram(s) IV Push once  dextrose 50% Injectable 25 Gram(s) IV Push once  enoxaparin Injectable 40 milliGRAM(s) SubCutaneous every 24 hours  ibuprofen  Tablet. 600 milliGRAM(s) Oral every 6 hours  insulin lispro (HumaLOG) corrective regimen sliding scale   SubCutaneous Before meals and at bedtime  labetalol 200 milliGRAM(s) Oral every 12 hours  lactated ringers. 1000 milliLiter(s) (125 mL/Hr) IV Continuous <Continuous>  simethicone 80 milliGRAM(s) Chew every 8 hours    MEDICATIONS  (PRN):  ALBUTerol    90 MICROgram(s) HFA Inhaler 2 Puff(s) Inhalation every 6 hours PRN Shortness of Breath and/or Wheezing  dextrose 40% Gel 15 Gram(s) Oral once PRN Blood Glucose LESS THAN 70 milliGRAM(s)/deciliter  glucagon  Injectable 1 milliGRAM(s) IntraMuscular once PRN Glucose LESS THAN 70 milligrams/deciliter  HYDROmorphone  Injectable 0.2 milliGRAM(s) IV Push every 4 hours PRN breakthrough pain  metoclopramide Injectable 10 milliGRAM(s) IV Push every 6 hours PRN Nausea and/or Vomiting -2nd Line  ondansetron Injectable 4 milliGRAM(s) IV Push every 6 hours PRN Nausea and/or Vomiting 1st line  oxyCODONE    IR 5 milliGRAM(s) Oral every 4 hours PRN Mild Pain (1 - 3)  oxyCODONE    IR 10 milliGRAM(s) Oral every 6 hours PRN Severe Pain (7 - 10)  zinc oxide 20% Ointment 1 Application(s) Topical every 6 hours PRN skin blisters  zinc oxide 40% Ointment 1 Application(s) Topical every 4 hours PRN skin abrasion      PHYSICAL EXAM  Vital Signs Last 24 Hrs  T(C): 36.6 (30 Apr 2019 14:30), Max: 37.2 (29 Apr 2019 22:20)  T(F): 97.9 (30 Apr 2019 14:30), Max: 99 (29 Apr 2019 22:20)  HR: 83 (30 Apr 2019 14:30) (83 - 92)  BP: 143/86 (30 Apr 2019 14:30) (123/66 - 143/86)  BP(mean): --  RR: 18 (30 Apr 2019 14:30) (17 - 18)  SpO2: 98% (30 Apr 2019 14:30) (98% - 100%)    Constitutional: wn/wd in NAD.   HEENT: NCAT, MMM, OP clear, EOMI, no proptosis or lid retraction  Neck: no thyromegaly or palpable thyroid nodules   Respiratory: lungs CTAB.  Cardiovascular: regular rhythm, normal S1 and S2, no audible murmurs, no peripheral edema  GI: soft, NT/ND, no masses/HSM appreciated.  Neurology: no tremors, DTR 2+  Skin: surgical incision without surrounding erythema or purulent discharge. ABBIE drain in place draining bloody discharge  Psychiatric: AAO x 3, normal affect/mood.    LABS:                        8.8    10.96 )-----------( 455      ( 30 Apr 2019 05:54 )             28.3                   HbA1C: 6.0 % (04-23 @ 11:31)    CAPILLARY BLOOD GLUCOSE      POCT Blood Glucose.: 128 mg/dL (30 Apr 2019 11:50)  POCT Blood Glucose.: 104 mg/dL (30 Apr 2019 06:52)  POCT Blood Glucose.: 117 mg/dL (29 Apr 2019 22:31)  POCT Blood Glucose.: 136 mg/dL (29 Apr 2019 16:57)      Insulin Sliding Scale requirements X 24 Hours:    RADIOLOGY & ADDITIONAL TESTS:    A/P: 39y Female with history of DM type II presenting for       1.  DM -     Please continue           units lantus at bedtime  / in the morning and        units lispro with meals and lispro moderate / low dose sliding scale 4 times daily with meals and at bedtime.  Please continue consistent carbohydrate diet.      Goal FSG is   Will continue to monitor   For discharge, pt can continue    Pt can follow up at discharge with [facility] by calling  to make an appointment.   Will discuss case with     and update primary team

## 2019-05-01 DIAGNOSIS — K91.89 OTHER POSTPROCEDURAL COMPLICATIONS AND DISORDERS OF DIGESTIVE SYSTEM: ICD-10-CM

## 2019-05-01 DIAGNOSIS — K56.7 ILEUS, UNSPECIFIED: ICD-10-CM

## 2019-05-01 DIAGNOSIS — J45.909 UNSPECIFIED ASTHMA, UNCOMPLICATED: ICD-10-CM

## 2019-05-01 DIAGNOSIS — Z87.891 PERSONAL HISTORY OF NICOTINE DEPENDENCE: ICD-10-CM

## 2019-05-01 DIAGNOSIS — O34.211 MATERNAL CARE FOR LOW TRANSVERSE SCAR FROM PREVIOUS CESAREAN DELIVERY: ICD-10-CM

## 2019-05-01 DIAGNOSIS — Z3A.41 41 WEEKS GESTATION OF PREGNANCY: ICD-10-CM

## 2019-05-01 DIAGNOSIS — Z34.93 ENCOUNTER FOR SUPERVISION OF NORMAL PREGNANCY, UNSPECIFIED, THIRD TRIMESTER: ICD-10-CM

## 2019-05-01 DIAGNOSIS — D62 ACUTE POSTHEMORRHAGIC ANEMIA: ICD-10-CM

## 2019-05-01 LAB
GLUCOSE BLDC GLUCOMTR-MCNC: 107 MG/DL — HIGH (ref 70–99)
GLUCOSE BLDC GLUCOMTR-MCNC: 118 MG/DL — HIGH (ref 70–99)
GLUCOSE BLDC GLUCOMTR-MCNC: 128 MG/DL — HIGH (ref 70–99)
GLUCOSE BLDC GLUCOMTR-MCNC: 138 MG/DL — HIGH (ref 70–99)
HBA1C BLD-MCNC: 5.5 % — SIGNIFICANT CHANGE UP (ref 4–5.6)
HCT VFR BLD CALC: 30.7 % — LOW (ref 34.5–45)
HGB BLD-MCNC: 9.5 G/DL — LOW (ref 11.5–15.5)
MCHC RBC-ENTMCNC: 26.6 PG — LOW (ref 27–34)
MCHC RBC-ENTMCNC: 30.9 GM/DL — LOW (ref 32–36)
MCV RBC AUTO: 86 FL — SIGNIFICANT CHANGE UP (ref 80–100)
NRBC # BLD: 0 /100 WBCS — SIGNIFICANT CHANGE UP (ref 0–0)
PLATELET # BLD AUTO: 493 K/UL — HIGH (ref 150–400)
RBC # BLD: 3.57 M/UL — LOW (ref 3.8–5.2)
RBC # FLD: 15.9 % — HIGH (ref 10.3–14.5)
WBC # BLD: 11.26 K/UL — HIGH (ref 3.8–10.5)
WBC # FLD AUTO: 11.26 K/UL — HIGH (ref 3.8–10.5)

## 2019-05-01 PROCEDURE — 99231 SBSQ HOSP IP/OBS SF/LOW 25: CPT | Mod: GC

## 2019-05-01 RX ORDER — AZTREONAM 2 G
1000 VIAL (EA) INJECTION EVERY 8 HOURS
Qty: 0 | Refills: 0 | Status: COMPLETED | OUTPATIENT
Start: 2019-05-01 | End: 2019-05-02

## 2019-05-01 RX ORDER — AZTREONAM 2 G
1000 VIAL (EA) INJECTION EVERY 8 HOURS
Qty: 0 | Refills: 0 | Status: DISCONTINUED | OUTPATIENT
Start: 2019-05-02 | End: 2019-05-05

## 2019-05-01 RX ORDER — AZTREONAM 2 G
VIAL (EA) INJECTION
Qty: 0 | Refills: 0 | Status: DISCONTINUED | OUTPATIENT
Start: 2019-05-01 | End: 2019-05-01

## 2019-05-01 RX ORDER — HYDROCORTISONE 20 MG
200 TABLET ORAL ONCE
Qty: 0 | Refills: 0 | Status: COMPLETED | OUTPATIENT
Start: 2019-05-01 | End: 2019-05-02

## 2019-05-01 RX ORDER — IOHEXOL 300 MG/ML
30 INJECTION, SOLUTION INTRAVENOUS ONCE
Qty: 0 | Refills: 0 | Status: DISCONTINUED | OUTPATIENT
Start: 2019-05-01 | End: 2019-05-08

## 2019-05-01 RX ORDER — IBUPROFEN 200 MG
600 TABLET ORAL EVERY 6 HOURS
Qty: 0 | Refills: 0 | Status: DISCONTINUED | OUTPATIENT
Start: 2019-05-01 | End: 2019-05-08

## 2019-05-01 RX ORDER — DOCUSATE SODIUM 100 MG
100 CAPSULE ORAL THREE TIMES A DAY
Qty: 0 | Refills: 0 | Status: DISCONTINUED | OUTPATIENT
Start: 2019-05-01 | End: 2019-05-08

## 2019-05-01 RX ORDER — DIPHENHYDRAMINE HCL 50 MG
50 CAPSULE ORAL ONCE
Qty: 0 | Refills: 0 | Status: COMPLETED | OUTPATIENT
Start: 2019-05-01 | End: 2019-05-02

## 2019-05-01 RX ADMIN — OXYCODONE HYDROCHLORIDE 10 MILLIGRAM(S): 5 TABLET ORAL at 08:30

## 2019-05-01 RX ADMIN — OXYCODONE HYDROCHLORIDE 10 MILLIGRAM(S): 5 TABLET ORAL at 02:30

## 2019-05-01 RX ADMIN — SIMETHICONE 80 MILLIGRAM(S): 80 TABLET, CHEWABLE ORAL at 00:16

## 2019-05-01 RX ADMIN — Medication 50 MILLIGRAM(S): at 01:42

## 2019-05-01 RX ADMIN — Medication 3 MILLILITER(S): at 07:14

## 2019-05-01 RX ADMIN — Medication 200 MILLIGRAM(S): at 06:10

## 2019-05-01 RX ADMIN — Medication 100 MILLIGRAM(S): at 18:41

## 2019-05-01 RX ADMIN — Medication 100 MILLIGRAM(S): at 22:09

## 2019-05-01 RX ADMIN — OXYCODONE HYDROCHLORIDE 10 MILLIGRAM(S): 5 TABLET ORAL at 01:42

## 2019-05-01 RX ADMIN — Medication 600 MILLIGRAM(S): at 19:31

## 2019-05-01 RX ADMIN — OXYCODONE HYDROCHLORIDE 10 MILLIGRAM(S): 5 TABLET ORAL at 14:10

## 2019-05-01 RX ADMIN — Medication 600 MILLIGRAM(S): at 11:10

## 2019-05-01 RX ADMIN — OXYCODONE HYDROCHLORIDE 10 MILLIGRAM(S): 5 TABLET ORAL at 07:45

## 2019-05-01 RX ADMIN — SIMETHICONE 80 MILLIGRAM(S): 80 TABLET, CHEWABLE ORAL at 16:16

## 2019-05-01 RX ADMIN — OXYCODONE HYDROCHLORIDE 10 MILLIGRAM(S): 5 TABLET ORAL at 20:32

## 2019-05-01 RX ADMIN — Medication 600 MILLIGRAM(S): at 18:42

## 2019-05-01 RX ADMIN — Medication 3 MILLILITER(S): at 16:15

## 2019-05-01 RX ADMIN — Medication 600 MILLIGRAM(S): at 12:00

## 2019-05-01 RX ADMIN — SIMETHICONE 80 MILLIGRAM(S): 80 TABLET, CHEWABLE ORAL at 07:13

## 2019-05-01 RX ADMIN — OXYCODONE HYDROCHLORIDE 10 MILLIGRAM(S): 5 TABLET ORAL at 19:41

## 2019-05-01 RX ADMIN — OXYCODONE HYDROCHLORIDE 10 MILLIGRAM(S): 5 TABLET ORAL at 15:30

## 2019-05-01 RX ADMIN — Medication 200 MILLIGRAM(S): at 18:41

## 2019-05-01 NOTE — DIETITIAN INITIAL EVALUATION ADULT. - ENERGY NEEDS
Ht:5ft 7inches,IBW:135lbs +/-10%,156% of IBW prior to C/S.Discrepency with UBW:268 and 2018 stated UBW:230lbs.BMI:33.7.Needs for breast feeding.IBW:08hsy15-55nar plus 500kcal for breast feedinkcal-2330kcal and 1.4-1.6gmprotein(for wound and breast feeding):85-98gmprotein and 35-40cc fluids:2135-2440c fluids

## 2019-05-01 NOTE — PROVIDER CONTACT NOTE (OTHER) - ACTION/TREATMENT ORDERED:
Abdominal dressing over area changed. InterDry applied on site and pt education given to keep area as dry as possible, verbalizes understanding.

## 2019-05-01 NOTE — PROGRESS NOTE ADULT - SUBJECTIVE AND OBJECTIVE BOX
Patient seen at bedside w/ Dr. Molina. Patient reports increased drainage from the wound, foul smelling, and white fluid in her drain. Patient reports subjective fevers and chills, has not spiked a fever, but has been taking Motrin for pain. She is passing flatus and is hungry but has only had clears and tolerated. She is on a diabetic diet. No BM since before reoperation. She is voiding and ambulating, no nausea/vomiting.     Vital Signs Last 24 Hrs  T(C): 37.1 (01 May 2019 22:00), Max: 37.4 (01 May 2019 19:28)  T(F): 98.7 (01 May 2019 22:00), Max: 99.4 (01 May 2019 19:28)  HR: 77 (01 May 2019 22:00) (71 - 90)  BP: 136/78 (01 May 2019 22:00) (120/79 - 145/85)  BP(mean): --  RR: 17 (01 May 2019 22:00) (17 - 18)  SpO2: 100% (01 May 2019 22:00) (97% - 100%)    Gen NAD, AOx3  Chest normal work of breathing  Abdomen soft, nontender, nondistended, no rebound/guarding  Incision slightly opened at midline, draining serous fluid, incision approximated w/ staples w/ areas of erythema.   R ABBIE draining white purulent fluid, ~30 cc  Legs nontender, no edema                          9.5    11.26 )-----------( 493      ( 01 May 2019 12:52 )             30.7

## 2019-05-01 NOTE — PROGRESS NOTE ADULT - SUBJECTIVE AND OBJECTIVE BOX
Patient evaluated at bedside.  She reports headache since last night, has not slept well. She reports pain in her incision site, refusing tylenol because she states it does not help with the pain. She reports significant oozing from her incision and from the ABBIE drain this morning. She has been ambulating without assistance, voiding spontaneously, passing gas, tolerating regular diet.    Physical Exam:  Vital Signs Last 24 Hrs  T(C): 36.8 (01 May 2019 14:00), Max: 37.2 (01 May 2019 10:15)  T(F): 98.3 (01 May 2019 14:00), Max: 99 (01 May 2019 10:15)  HR: 71 (01 May 2019 14:00) (71 - 90)  BP: 120/79 (01 May 2019 14:00) (120/79 - 145/85)  BP(mean): --  RR: 18 (01 May 2019 14:00) (17 - 18)  SpO2: 97% (01 May 2019 14:00) (97% - 99%)    GA: NAD, A+0 x 3  Abd: soft, appropriately tender to palpation, nondistended, no rebound or guarding, incision clean, and intact, leakage of murky sanginous fluid from incision site and into ABBIE drain. uterus firm at midline,  below the umbilicus  : lochia WNL  Extremities: no calf tenderness                            9.5    11.26 )-----------( 493      ( 01 May 2019 12:52 )             30.7

## 2019-05-01 NOTE — PROGRESS NOTE ADULT - SUBJECTIVE AND OBJECTIVE BOX
INTERVAL HPI/OVERNIGHT EVENTS:    Patient is a 39y old  Female who presents with a chief complaint of wound complication (30 Apr 2019 14:53)      Pt reports the following symptoms:    CONSTITUTIONAL:  Negative fever or chills, feels well, good appetite  EYES:  Negative  blurry vision or double vision  CARDIOVASCULAR:  Negative for chest pain or palpitations  RESPIRATORY:  Negative for cough, wheezing, or SOB   GASTROINTESTINAL:  Negative for nausea, vomiting, diarrhea, constipation, or abdominal pain  GENITOURINARY:  Negative frequency, urgency or dysuria  NEUROLOGIC:  No headache, confusion, dizziness, lightheadedness    MEDICATIONS  (STANDING):  ALBUTerol/ipratropium for Nebulization 3 milliLiter(s) Nebulizer every 6 hours  dextrose 5%. 1000 milliLiter(s) (50 mL/Hr) IV Continuous <Continuous>  dextrose 50% Injectable 12.5 Gram(s) IV Push once  dextrose 50% Injectable 25 Gram(s) IV Push once  dextrose 50% Injectable 25 Gram(s) IV Push once  docusate sodium 100 milliGRAM(s) Oral three times a day  ibuprofen  Tablet. 600 milliGRAM(s) Oral every 6 hours  insulin lispro (HumaLOG) corrective regimen sliding scale   SubCutaneous Before meals and at bedtime  labetalol 200 milliGRAM(s) Oral every 12 hours  lactated ringers. 1000 milliLiter(s) (125 mL/Hr) IV Continuous <Continuous>  simethicone 80 milliGRAM(s) Chew every 8 hours    MEDICATIONS  (PRN):  ALBUTerol    90 MICROgram(s) HFA Inhaler 2 Puff(s) Inhalation every 6 hours PRN Shortness of Breath and/or Wheezing  dextrose 40% Gel 15 Gram(s) Oral once PRN Blood Glucose LESS THAN 70 milliGRAM(s)/deciliter  diphenhydrAMINE 50 milliGRAM(s) Oral every 4 hours PRN Rash and/or Itching  glucagon  Injectable 1 milliGRAM(s) IntraMuscular once PRN Glucose LESS THAN 70 milligrams/deciliter  HYDROmorphone  Injectable 0.2 milliGRAM(s) IV Push every 4 hours PRN breakthrough pain  metoclopramide Injectable 10 milliGRAM(s) IV Push every 6 hours PRN Nausea and/or Vomiting -2nd Line  ondansetron Injectable 4 milliGRAM(s) IV Push every 6 hours PRN Nausea and/or Vomiting 1st line  oxyCODONE    IR 5 milliGRAM(s) Oral every 4 hours PRN Mild Pain (1 - 3)  oxyCODONE    IR 10 milliGRAM(s) Oral every 6 hours PRN Severe Pain (7 - 10)  zinc oxide 20% Ointment 1 Application(s) Topical every 6 hours PRN skin blisters  zinc oxide 40% Ointment 1 Application(s) Topical every 4 hours PRN skin abrasion      PHYSICAL EXAM  Vital Signs Last 24 Hrs  T(C): 37.2 (01 May 2019 10:15), Max: 37.2 (01 May 2019 10:15)  T(F): 99 (01 May 2019 10:15), Max: 99 (01 May 2019 10:15)  HR: 90 (01 May 2019 10:15) (82 - 90)  BP: 145/85 (01 May 2019 10:15) (122/76 - 145/85)  BP(mean): --  RR: 17 (01 May 2019 10:15) (17 - 18)  SpO2: 99% (01 May 2019 10:15) (97% - 99%)    Constitutional: wn/wd in NAD.   HEENT: NCAT, MMM, OP clear, EOMI, no proptosis or lid retraction  Neck: no thyromegaly or palpable thyroid nodules   Respiratory: lungs CTAB.  Cardiovascular: regular rhythm, normal S1 and S2, no audible murmurs, no peripheral edema  GI: soft, NT/ND, no masses/HSM appreciated.  Neurology: no tremors, DTR 2+  Skin: no visible rashes/lesions  Psychiatric: AAO x 3, normal affect/mood.    LABS:                        9.5    11.26 )-----------( 493      ( 01 May 2019 12:52 )             30.7     HbA1C: 6.0 % (04-23 @ 11:31)    CAPILLARY BLOOD GLUCOSE  POCT Blood Glucose.: 128 mg/dL (01 May 2019 11:48)  POCT Blood Glucose.: 107 mg/dL (01 May 2019 07:55)  POCT Blood Glucose.: 150 mg/dL (30 Apr 2019 21:56)  POCT Blood Glucose.: 106 mg/dL (30 Apr 2019 19:00)      Insulin Sliding Scale requirements X 24 Hours:    RADIOLOGY & ADDITIONAL TESTS:    A/P: 39y Female with history of DM type II presenting for       1.  DM -     Please continue           units lantus at bedtime  / in the morning and        units lispro with meals and lispro moderate / low dose sliding scale 4 times daily with meals and at bedtime.  Please continue consistent carbohydrate diet.      Goal FSG is   Will continue to monitor   For discharge, pt can continue    Pt can follow up at discharge with [facility] by calling  to make an appointment.   Will discuss case with     and update primary team INTERVAL HPI/OVERNIGHT EVENTS:    Patient seen at bedside, resting comfortably. Noted o only be on clears today. Sugars in low 100s.     Pt reports the following symptoms:    CONSTITUTIONAL:  Negative fever or chills, feels well, good appetite  EYES:  Negative  blurry vision or double vision  CARDIOVASCULAR:  Negative for chest pain or palpitations  RESPIRATORY:  Negative for cough, wheezing, or SOB   GASTROINTESTINAL:  Negative for nausea, vomiting, diarrhea, constipation, or abdominal pain  GENITOURINARY:  Negative frequency, urgency or dysuria  NEUROLOGIC:  No headache, confusion, dizziness, lightheadedness    MEDICATIONS  (STANDING):  ALBUTerol/ipratropium for Nebulization 3 milliLiter(s) Nebulizer every 6 hours  dextrose 5%. 1000 milliLiter(s) (50 mL/Hr) IV Continuous <Continuous>  dextrose 50% Injectable 12.5 Gram(s) IV Push once  dextrose 50% Injectable 25 Gram(s) IV Push once  dextrose 50% Injectable 25 Gram(s) IV Push once  docusate sodium 100 milliGRAM(s) Oral three times a day  ibuprofen  Tablet. 600 milliGRAM(s) Oral every 6 hours  insulin lispro (HumaLOG) corrective regimen sliding scale   SubCutaneous Before meals and at bedtime  labetalol 200 milliGRAM(s) Oral every 12 hours  lactated ringers. 1000 milliLiter(s) (125 mL/Hr) IV Continuous <Continuous>  simethicone 80 milliGRAM(s) Chew every 8 hours    MEDICATIONS  (PRN):  ALBUTerol    90 MICROgram(s) HFA Inhaler 2 Puff(s) Inhalation every 6 hours PRN Shortness of Breath and/or Wheezing  dextrose 40% Gel 15 Gram(s) Oral once PRN Blood Glucose LESS THAN 70 milliGRAM(s)/deciliter  diphenhydrAMINE 50 milliGRAM(s) Oral every 4 hours PRN Rash and/or Itching  glucagon  Injectable 1 milliGRAM(s) IntraMuscular once PRN Glucose LESS THAN 70 milligrams/deciliter  HYDROmorphone  Injectable 0.2 milliGRAM(s) IV Push every 4 hours PRN breakthrough pain  metoclopramide Injectable 10 milliGRAM(s) IV Push every 6 hours PRN Nausea and/or Vomiting -2nd Line  ondansetron Injectable 4 milliGRAM(s) IV Push every 6 hours PRN Nausea and/or Vomiting 1st line  oxyCODONE    IR 5 milliGRAM(s) Oral every 4 hours PRN Mild Pain (1 - 3)  oxyCODONE    IR 10 milliGRAM(s) Oral every 6 hours PRN Severe Pain (7 - 10)  zinc oxide 20% Ointment 1 Application(s) Topical every 6 hours PRN skin blisters  zinc oxide 40% Ointment 1 Application(s) Topical every 4 hours PRN skin abrasion      PHYSICAL EXAM  Vital Signs Last 24 Hrs  T(C): 37.2 (01 May 2019 10:15), Max: 37.2 (01 May 2019 10:15)  T(F): 99 (01 May 2019 10:15), Max: 99 (01 May 2019 10:15)  HR: 90 (01 May 2019 10:15) (82 - 90)  BP: 145/85 (01 May 2019 10:15) (122/76 - 145/85)  BP(mean): --  RR: 17 (01 May 2019 10:15) (17 - 18)  SpO2: 99% (01 May 2019 10:15) (97% - 99%)    Constitutional: wn/wd in NAD.   HEENT: NCAT, OP clear, EOMI, no proptosis or lid retraction  Neck: no thyromegaly or palpable thyroid nodules   Respiratory: lungs CTAB.  Cardiovascular: regular rhythm, normal S1 and S2, no audible murmurs, no peripheral edema  GI: soft, NT/ND, no masses/HSM appreciated.  Neurology: no tremors, DTR 2+  Skin: surgical incision without surrounding erythema or purulent discharge.   Psychiatric: AAO x 3, normal affect/mood.    LABS:                        9.5    11.26 )-----------( 493      ( 01 May 2019 12:52 )             30.7     HbA1C: 6.0 % ( @ 11:31)    CAPILLARY BLOOD GLUCOSE  POCT Blood Glucose.: 128 mg/dL (01 May 2019 11:48)  POCT Blood Glucose.: 107 mg/dL (01 May 2019 07:55)  POCT Blood Glucose.: 150 mg/dL (2019 21:56)  POCT Blood Glucose.: 106 mg/dL (2019 19:00)    A/P: 39yoF a PMH of DM, HTN, asthma, CIN3 s/p LEEP , CIN2 s/p LEEP 2005, herna c/b possible incarcerated bowel  s/p mesh placement requiring 3 additional surgeries, admitted for bleeding 2/2  post-op complication  now POD#1 ex lap and 2U pRBC's.     -Continue moderate SS at this point.     Pt can follow up at discharge with [facility] by calling  to make an appointment.   Will discuss case with Dr. Bowden  and update primary team

## 2019-05-01 NOTE — PROGRESS NOTE ADULT - ASSESSMENT
POD3 after ex lap for hemoperitoneum , also POD12 after  now w/ increased wound drainage. Afebrile w/ WBC downtrending, however subjective fevers/chills. Due to patient's extensive surgical hx and concern for wound infection, general surgery consulted. Will also consult ID. Plan for CT and IV abx. Discussed plan with Dr. Molina.

## 2019-05-01 NOTE — DIETITIAN INITIAL EVALUATION ADULT. - NS AS NUTRI INTERV MEALS SNACK
Mineral - modified diet/continue with CCHO/DASH diet/Carbohydrate - modified diet/Protein - modified diet

## 2019-05-01 NOTE — PROVIDER CONTACT NOTE (OTHER) - ASSESSMENT
Staples over site well approximated. Sit appears slightly red around the wound. Drainage over site on abdominal binder was 4cm x 16cm from 12:00-16:00, and 3cm x 10cm from 16:00 - 19:00. ABBIE drain has light brown purulent drainage of 50cc from 10:30 - 19:00. Site has foul smell. Skin over pubic area appears swollen, red and tender to touch as per pt.

## 2019-05-01 NOTE — PROGRESS NOTE ADULT - ASSESSMENT
38yo with hx of severe preeclampsia, HTN, T2DM s/p C/S#3 on 4/20 presenting for severe abdominal pain, concern for intraabdominal bleeding, now POD3 s/p exploratory laparotomy and evacuation of hemoperitoneum, stable. increased murky sanguinous fluid from incision and ABBIE drain today. CBC stable, pt afebrile, will continue to monitor.  1. Pain: Motrin ATC, , oxycodone prn, zinc oxide prn skin blisters  2. CV: labetalol 200 BID for HTN. asymptomatic  3. Pulm: albuterol prn for wheezing.  3. GI: diabetic DASH, simethicone, colace   3. : voiding  4. DVT prophylaxis: Lovenox,  SCDs, ambulation  5. Endo: continue moderate ISS per endocrine  6. Heme: Hgb stable s/p 2u PRBC  7. Dispo: pending stable clinical status

## 2019-05-01 NOTE — DIETITIAN INITIAL EVALUATION ADULT. - OTHER INFO
40 y/o known patient with severe pre-eclampsia and DM s/p C/S on 4/20 with post op bleeding and exp.lap.Still with pain and nausea.No diarrhea and skin with surgical incision from surgery.Patient has been educated on diabetic and DASH diet during previous admission.Well versed with diet principles..

## 2019-05-02 LAB
ANION GAP SERPL CALC-SCNC: 13 MMOL/L — SIGNIFICANT CHANGE UP (ref 5–17)
BUN SERPL-MCNC: 6 MG/DL — LOW (ref 7–23)
CALCIUM SERPL-MCNC: 8.7 MG/DL — SIGNIFICANT CHANGE UP (ref 8.4–10.5)
CHLORIDE SERPL-SCNC: 102 MMOL/L — SIGNIFICANT CHANGE UP (ref 96–108)
CO2 SERPL-SCNC: 23 MMOL/L — SIGNIFICANT CHANGE UP (ref 22–31)
CREAT SERPL-MCNC: 0.63 MG/DL — SIGNIFICANT CHANGE UP (ref 0.5–1.3)
GLUCOSE BLDC GLUCOMTR-MCNC: 112 MG/DL — HIGH (ref 70–99)
GLUCOSE BLDC GLUCOMTR-MCNC: 157 MG/DL — HIGH (ref 70–99)
GLUCOSE BLDC GLUCOMTR-MCNC: 165 MG/DL — HIGH (ref 70–99)
GLUCOSE BLDC GLUCOMTR-MCNC: 185 MG/DL — HIGH (ref 70–99)
GLUCOSE SERPL-MCNC: 139 MG/DL — HIGH (ref 70–99)
GRAM STN FLD: SIGNIFICANT CHANGE UP
HCT VFR BLD CALC: 33.4 % — LOW (ref 34.5–45)
HGB BLD-MCNC: 10.3 G/DL — LOW (ref 11.5–15.5)
MCHC RBC-ENTMCNC: 26.6 PG — LOW (ref 27–34)
MCHC RBC-ENTMCNC: 30.8 GM/DL — LOW (ref 32–36)
MCV RBC AUTO: 86.3 FL — SIGNIFICANT CHANGE UP (ref 80–100)
NRBC # BLD: 0 /100 WBCS — SIGNIFICANT CHANGE UP (ref 0–0)
PLATELET # BLD AUTO: 544 K/UL — HIGH (ref 150–400)
POTASSIUM SERPL-MCNC: 3.7 MMOL/L — SIGNIFICANT CHANGE UP (ref 3.5–5.3)
POTASSIUM SERPL-SCNC: 3.7 MMOL/L — SIGNIFICANT CHANGE UP (ref 3.5–5.3)
RBC # BLD: 3.87 M/UL — SIGNIFICANT CHANGE UP (ref 3.8–5.2)
RBC # FLD: 15.7 % — HIGH (ref 10.3–14.5)
SODIUM SERPL-SCNC: 138 MMOL/L — SIGNIFICANT CHANGE UP (ref 135–145)
SPECIMEN SOURCE: SIGNIFICANT CHANGE UP
WBC # BLD: 10.85 K/UL — HIGH (ref 3.8–10.5)
WBC # FLD AUTO: 10.85 K/UL — HIGH (ref 3.8–10.5)

## 2019-05-02 PROCEDURE — 74177 CT ABD & PELVIS W/CONTRAST: CPT | Mod: 26

## 2019-05-02 PROCEDURE — 99222 1ST HOSP IP/OBS MODERATE 55: CPT

## 2019-05-02 RX ORDER — ENOXAPARIN SODIUM 100 MG/ML
40 INJECTION SUBCUTANEOUS ONCE
Qty: 0 | Refills: 0 | Status: DISCONTINUED | OUTPATIENT
Start: 2019-05-02 | End: 2019-05-03

## 2019-05-02 RX ORDER — VANCOMYCIN HCL 1 G
1500 VIAL (EA) INTRAVENOUS EVERY 12 HOURS
Qty: 0 | Refills: 0 | Status: DISCONTINUED | OUTPATIENT
Start: 2019-05-03 | End: 2019-05-05

## 2019-05-02 RX ORDER — VANCOMYCIN HCL 1 G
VIAL (EA) INTRAVENOUS
Qty: 0 | Refills: 0 | Status: DISCONTINUED | OUTPATIENT
Start: 2019-05-02 | End: 2019-05-05

## 2019-05-02 RX ORDER — METRONIDAZOLE 500 MG
500 TABLET ORAL EVERY 8 HOURS
Qty: 0 | Refills: 0 | Status: DISCONTINUED | OUTPATIENT
Start: 2019-05-02 | End: 2019-05-06

## 2019-05-02 RX ORDER — VANCOMYCIN HCL 1 G
1500 VIAL (EA) INTRAVENOUS ONCE
Qty: 0 | Refills: 0 | Status: COMPLETED | OUTPATIENT
Start: 2019-05-02 | End: 2019-05-02

## 2019-05-02 RX ORDER — VANCOMYCIN HCL 1 G
VIAL (EA) INTRAVENOUS
Qty: 0 | Refills: 0 | Status: DISCONTINUED | OUTPATIENT
Start: 2019-05-02 | End: 2019-05-02

## 2019-05-02 RX ADMIN — Medication 100 MILLIGRAM(S): at 01:17

## 2019-05-02 RX ADMIN — Medication 10 MILLIGRAM(S): at 13:52

## 2019-05-02 RX ADMIN — Medication 50 MILLIGRAM(S): at 07:43

## 2019-05-02 RX ADMIN — Medication 600 MILLIGRAM(S): at 21:16

## 2019-05-02 RX ADMIN — Medication 50 MILLIGRAM(S): at 00:10

## 2019-05-02 RX ADMIN — Medication 600 MILLIGRAM(S): at 14:40

## 2019-05-02 RX ADMIN — Medication 50 MILLIGRAM(S): at 04:38

## 2019-05-02 RX ADMIN — OXYCODONE HYDROCHLORIDE 10 MILLIGRAM(S): 5 TABLET ORAL at 12:23

## 2019-05-02 RX ADMIN — SIMETHICONE 80 MILLIGRAM(S): 80 TABLET, CHEWABLE ORAL at 18:09

## 2019-05-02 RX ADMIN — Medication 100 MILLIGRAM(S): at 06:26

## 2019-05-02 RX ADMIN — Medication 50 MILLIGRAM(S): at 22:49

## 2019-05-02 RX ADMIN — OXYCODONE HYDROCHLORIDE 10 MILLIGRAM(S): 5 TABLET ORAL at 13:00

## 2019-05-02 RX ADMIN — Medication 2: at 12:15

## 2019-05-02 RX ADMIN — OXYCODONE HYDROCHLORIDE 5 MILLIGRAM(S): 5 TABLET ORAL at 01:23

## 2019-05-02 RX ADMIN — Medication 500 MILLIGRAM(S): at 22:50

## 2019-05-02 RX ADMIN — Medication 200 MILLIGRAM(S): at 18:11

## 2019-05-02 RX ADMIN — Medication 300 MILLIGRAM(S): at 19:14

## 2019-05-02 RX ADMIN — Medication 600 MILLIGRAM(S): at 20:45

## 2019-05-02 RX ADMIN — Medication 50 MILLIGRAM(S): at 00:09

## 2019-05-02 RX ADMIN — Medication 600 MILLIGRAM(S): at 14:00

## 2019-05-02 RX ADMIN — SIMETHICONE 80 MILLIGRAM(S): 80 TABLET, CHEWABLE ORAL at 00:10

## 2019-05-02 RX ADMIN — Medication 200 MILLIGRAM(S): at 06:26

## 2019-05-02 RX ADMIN — Medication 100 MILLIGRAM(S): at 09:07

## 2019-05-02 RX ADMIN — Medication 200 MILLIGRAM(S): at 01:35

## 2019-05-02 RX ADMIN — Medication 2: at 23:06

## 2019-05-02 RX ADMIN — OXYCODONE HYDROCHLORIDE 5 MILLIGRAM(S): 5 TABLET ORAL at 02:10

## 2019-05-02 RX ADMIN — Medication 100 MILLIGRAM(S): at 18:09

## 2019-05-02 RX ADMIN — Medication 50 MILLIGRAM(S): at 17:50

## 2019-05-02 NOTE — CONSULT NOTE ADULT - SUBJECTIVE AND OBJECTIVE BOX
39 F with hx of severe preeclampsia, HTN, T2DM s/p C/S on 4/20 complicated by intraabdominal bleeding, now POD5 s/p exploratory laparotomy and evacuation of hemoperitoneum. Since surgery patient has been complaining from abdominal pain, she also reports subjective fevers, she denies any nausea or vomiting, she is able to pass gas but not stool yet.    Gyn noticed change in RLQ ABBIE drain content and amount (Murky with bad smell)      Vital Signs Last 24 Hrs  T(C): 37.2 (02 May 2019 02:23), Max: 37.4 (01 May 2019 19:28)  T(F): 98.9 (02 May 2019 02:23), Max: 99.4 (01 May 2019 19:28)  HR: 76 (02 May 2019 02:23) (71 - 90)  BP: 153/76 (02 May 2019 02:23) (120/79 - 153/76)  BP(mean): --  RR: 18 (02 May 2019 02:23) (17 - 18)  SpO2: 97% (02 May 2019 02:23) (97% - 100%)    Physical Exam:  General: Awake, alert and oriented  Resp: non labored, not in distress  Abdomen:  Pfannenstiel incision with surrounding erythema and murky discharge, no fluctuance  ABBIE coming from RLQ with murky fluid  Soft, ND,NT         LABS:                        9.5    11.26 )-----------( 493      ( 01 May 2019 12:52 )             30.7             CAPILLARY BLOOD GLUCOSE      POCT Blood Glucose.: 118 mg/dL (01 May 2019 22:08)  POCT Blood Glucose.: 138 mg/dL (01 May 2019 18:12)  POCT Blood Glucose.: 128 mg/dL (01 May 2019 11:48)  POCT Blood Glucose.: 107 mg/dL (01 May 2019 07:55)

## 2019-05-02 NOTE — PROVIDER CONTACT NOTE (OTHER) - SITUATION
S/p C section on 4/20/19 readmitted for evacuation of lower abdominal hematoma. Patient c/o of pain, tenderness and drainage from lower abdominal incision area. Staples over site well approximated.

## 2019-05-02 NOTE — PROVIDER CONTACT NOTE (OTHER) - BACKGROUND
Sit apears slightly red around the wound. Drainage over site on abdominal binder was [id number] pink creamy drannage with odor at 23:45, from ABBIE drain 40cc and also has foul smell, dressing change again

## 2019-05-02 NOTE — PROGRESS NOTE ADULT - SUBJECTIVE AND OBJECTIVE BOX
patient seen at bedside. patient is refusing to drink contrast. states she has always vomited when drinking contrast and that she doesn't want "to die". refuses both iodine-containing contrast and non-iodine-containing contrast. understands that we will not be able to properly visualize bowel without po contrast.
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
## 2019-05-02 NOTE — PROGRESS NOTE ADULT - ASSESSMENT
38yo with hx of severe preeclampsia, HTN, T2DM s/p C/S#3 on 4/20 presenting for severe abdominal pain, concern for intraabdominal bleeding, now POD4 s/p exploratory laparotomy and evacuation of hemoperitoneum, stable. Incision and ABBIE drain now with persistent purulent drainage. Pt remains afebrile and WBC normal. given h/o of abdominal incision infection, started Azrtreonam and clindamycin overnight. ID consulted this AM, will f/u recs. CT abdomen/plevis performed overnight, f/u read. Gen surg consulted, will f/u recs.  1. Pain: Motrin ATC, , oxycodone prn, zinc oxide prn skin blisters  2. CV: labetalol 200 BID for HTN. asymptomatic  3. Pulm: albuterol prn for wheezing.  3. GI: diabetic DASH, simethicone, colace   3. : voiding  4. DVT prophylaxis: Lovenox,  SCDs, ambulation  5. Endo: continue moderate ISS per endocrine  6. Heme: Hgb stable s/p 2u PRBC  8. ID: Aztreonam 1g q8, clindamycin 900mg q8h, f/u ID recs, f/u drain culture  7. Dispo: pending stable clinical status

## 2019-05-02 NOTE — PROGRESS NOTE ADULT - SUBJECTIVE AND OBJECTIVE BOX
interval events: seen by general surgery, state no surgical intervention recommended at this time. seen by ID, recommend continue aztreonam, add vancomycin and metronidazole.    Patient evaluated at bedside. She denies fever, chills. She continue to endorse purulent drainage from her incision. She is ambulating, voiding, tolerating regular diet.     Physical Exam:  Vital Signs Last 24 Hrs  T(C): 36.8 (02 May 2019 18:00), Max: 37.2 (02 May 2019 02:23)  T(F): 98.3 (02 May 2019 18:00), Max: 98.9 (02 May 2019 02:23)  HR: 89 (02 May 2019 18:00) (72 - 89)  BP: 141/75 (02 May 2019 18:00) (136/78 - 153/76)  BP(mean): --  RR: 18 (02 May 2019 18:00) (17 - 18)  SpO2: 100% (02 May 2019 18:00) (97% - 100%)    GA: NAD, A+0 x 3  Abd: soft, nontender, nondistended, no rebound or guarding, induration up to 3 fingerbreaths above incision, incision mildly erythematous at edge, moist, no purulent drainage on expressing, abdominal pad with purulent drainage on it. ABBIE with 15ccof purulent drainage.  : lochia WNL  Extremities: no calf tenderness                            10.3   10.85 )-----------( 544      ( 02 May 2019 12:53 )             33.4         138  |  102  |  6<L>  ----------------------------<  139<H>  3.7   |  23  |  0.63    Ca    8.7      02 May 2019 12:53        EXAM:  CT ABDOMEN AND PELVIS OC IC                          PROCEDURE DATE:  2019          INTERPRETATION:  Clinical information: Increased purulent discharge from   the wound.    Comparison:     PROCEDURE:   CT of the Abdomen and Pelvis was performed with intravenous contrast.  90ml of Omnipaque 350 was injected intravenously. 10cc was discarded.    FINDINGS:    LOWER CHEST: Within normal limits    ABDOMEN:  LIVER: Within normal limits  BILE DUCTS: Normal caliber  GALLBLADDER: No calcified gallstones. Normal caliber wall  PANCREAS: Within normal limits  SPLEEN: Within normal limits  ADRENALS: Within normal limits  KIDNEYS: Punctate renal calculi    PELVIS:  REPRODUCTIVE ORGANS: Postpartum uterus.  BLADDER: Trace intraluminal air    PERITONEUM:No ascites, no free air. No intraperitoneal hematoma.  BOWEL: Within normal limits.  VESSELS: Within normal limits  RETROPERITONEUM: No retroperitoneal or pelvic adenopathy  ABDOMINAL WALL: Right-sided drain within the lower abdominal wall   hematoma/collection which is decreased in size.  scar with skin   staples.  MUSCULOSKELETAL: Within normal limits    IMPRESSION:     Decreasing abdominal wall hematoma/collection with a drain in place. No   new acute findings.                "Thank you for the opportunity to participate in the care of this   patient."        MITRA HOLCOMB M.D., ATTENDING RADIOLOGIST  This document has been electronically signed. May  2 2019  9:31AM

## 2019-05-02 NOTE — PROGRESS NOTE ADULT - ASSESSMENT
40yo with hx of severe preeclampsia, HTN, T2DM s/p C/S#3 on 4/20 presenting for severe abdominal pain, concern for intraabdominal bleeding, now POD4 s/p exploratory laparotomy and evacuation of hemoperitoneum, stable. Incision and ABBIE drain now with persistent purulent drainage. Pt remains afebrile and WBC normal. given h/o of abdominal incision infection, started Azrtreonam and clindamycin overnight. ID consulted recommend d/c clinda, start vancomycin 15mg/kg q8 and metronidazole PO 500mg q8. PCN allergy testing in AM. Gen surg signed off, no intervention at this time. f/u wound culture.  1. Pain: Motrin ATC, , oxycodone prn, zinc oxide prn skin blisters  2. CV: labetalol 200 BID for HTN. asymptomatic  3. Pulm: albuterol prn for wheezing.  3. GI: diabetic DASH, simethicone, colace   3. : voiding  4. DVT prophylaxis: restart lovenox,  SCDs, ambulation  5. Endo: continue moderate ISS per endocrine  6. Heme: Hgb stable s/p 2u PRBC. AM labs  8. ID: Aztreonam 1g q8, vancomycin 1500mg IV q12, PO metronidazole 500mg q8.  s/p clindamycin (5/1-2), f/u ID recs, f/u drain culture  7. Dispo: pending stable clinical status

## 2019-05-02 NOTE — PROGRESS NOTE ADULT - ASSESSMENT
39 F with hx of severe preeclampsia, HTN, T2DM s/p C/S on 4/20 complicated by intraabdominal bleeding, s/p exploratory laparotomy and evacuation of hemoperitoneum on 4/27, POD 5, now with possible c/b intra-abdominal infection/abscess.    Patient is afebrile and hemodynamically stable, AM labs are pending, CT abd/pelvis was done overnight - pending read.     Recs :  Will follow up final CT read and pending AM labs  Continue IV.Antibiotics for now   Will follow up ABBIE drain culture     Pending plan d/w  39 F with hx of severe preeclampsia, HTN, T2DM s/p C/S on 4/20 complicated by intraabdominal bleeding, s/p exploratory laparotomy and evacuation of hemoperitoneum on 4/27, POD 5, now with possible c/b intra-abdominal infection/abscess.    Patient is afebrile and hemodynamically stable, AM labs are pending, CT abd/pelvis was done overnight - pending read.     Recs :  Will follow up final CT read and pending AM labs  Continue IV.Antibiotics for now   Will follow up ABBIE drain culture     Addendum :    WBC is improving, afebrile, CT shows improvement of collections with no new acute abdominal pathology.   No general surgery related pathology hence not for any surgical intervention  Surgery will sign off  Reconsult as needed    Plan d/w

## 2019-05-02 NOTE — PROVIDER CONTACT NOTE (OTHER) - ASSESSMENT
at 03:15 drainage amount 3cmx13 cm and lat time at 0750 drainage size `3cm x13 cm ABBIE drain 15cc.   skin over pubic area apears swallen and tender. Will continue to monitor site and drainage,change dry dressing over area patient explianed to keep area dry and clean patient verbalizes understanding.

## 2019-05-02 NOTE — PROGRESS NOTE ADULT - SUBJECTIVE AND OBJECTIVE BOX
STATUS POST:  Exlap with hemoperitoneum evacuation 4/27 POD 5     SUBJECTIVE: Patient seen and examined bedside, complain of pain over the lower part of her abdomen, nausea, realized murky fluid from her RLQ ABBIE drain since yesterday. +chills, no fever recorded.     aztreonam  IVPB 1000 milliGRAM(s) IV Intermittent every 8 hours  aztreonam  IVPB 1000 milliGRAM(s) IV Intermittent every 8 hours  clindamycin IVPB 900 milliGRAM(s) IV Intermittent every 8 hours  clindamycin IVPB      labetalol 200 milliGRAM(s) Oral every 12 hours      Vital Signs Last 24 Hrs  T(C): 36.6 (02 May 2019 06:36), Max: 37.4 (01 May 2019 19:28)  T(F): 97.9 (02 May 2019 06:36), Max: 99.4 (01 May 2019 19:28)  HR: 72 (02 May 2019 06:36) (71 - 90)  BP: 147/83 (02 May 2019 06:36) (120/79 - 153/76)  BP(mean): --  RR: 18 (02 May 2019 06:36) (17 - 18)  SpO2: 97% (02 May 2019 02:23) (97% - 100%)  I&O's Detail    01 May 2019 07:01  -  02 May 2019 07:00  --------------------------------------------------------  IN:  Total IN: 0 mL    OUT:    Bulb: 115 mL  Total OUT: 115 mL    Total NET: -115 mL          General: NAD, resting comfortably in bed  C/V: NSR  Pulm: Nonlabored breathing, no respiratory distress  Abd: soft, tender at RLQ, suprapupic and LLQ. ABBIE drain with purulent output. foul smelling, no guarding, not peritoneal  Extrem: WWP, no edema        LABS:                        9.5    11.26 )-----------( 493      ( 01 May 2019 12:52 )             30.7                 RADIOLOGY & ADDITIONAL STUDIES: STATUS POST:  Exlap with hemoperitoneum evacuation  POD 5     SUBJECTIVE: Patient seen and examined bedside, complain of pain over the lower part of her abdomen, nausea, realized murky fluid from her RLQ ABBIE drain since yesterday. +chills, no fever recorded.     aztreonam  IVPB 1000 milliGRAM(s) IV Intermittent every 8 hours  aztreonam  IVPB 1000 milliGRAM(s) IV Intermittent every 8 hours  clindamycin IVPB 900 milliGRAM(s) IV Intermittent every 8 hours  clindamycin IVPB      labetalol 200 milliGRAM(s) Oral every 12 hours      Vital Signs Last 24 Hrs  T(C): 36.6 (02 May 2019 06:36), Max: 37.4 (01 May 2019 19:28)  T(F): 97.9 (02 May 2019 06:36), Max: 99.4 (01 May 2019 19:28)  HR: 72 (02 May 2019 06:36) (71 - 90)  BP: 147/83 (02 May 2019 06:36) (120/79 - 153/76)  BP(mean): --  RR: 18 (02 May 2019 06:36) (17 - 18)  SpO2: 97% (02 May 2019 02:23) (97% - 100%)  I&O's Detail    01 May 2019 07:01  -  02 May 2019 07:00  --------------------------------------------------------  IN:  Total IN: 0 mL    OUT:    Bulb: 115 mL  Total OUT: 115 mL    Total NET: -115 mL          General: NAD, resting comfortably in bed  C/V: NSR  Pulm: Nonlabored breathing, no respiratory distress  Abd: soft, tender at RLQ, suprapupic and LLQ. ABBIE drain with purulent output. foul smelling, no guarding, not peritoneal  Extrem: WWP, no edema        LABS:                        9.5    11.26 )-----------( 493      ( 01 May 2019 12:52 )             30.7           RADIOLOGY & ADDITIONAL STUDIES:    FINDINGS:    LOWER CHEST: Within normal limits    ABDOMEN:  LIVER: Within normal limits  BILE DUCTS: Normal caliber  GALLBLADDER: No calcified gallstones. Normal caliber wall  PANCREAS: Within normal limits  SPLEEN: Within normal limits  ADRENALS: Within normal limits  KIDNEYS: Punctate renal calculi    PELVIS:  REPRODUCTIVE ORGANS: Postpartum uterus.  BLADDER: Trace intraluminal air    PERITONEUM:No ascites, no free air. No intraperitoneal hematoma.  BOWEL: Within normal limits.  VESSELS: Within normal limits  RETROPERITONEUM: No retroperitoneal or pelvic adenopathy  ABDOMINAL WALL: Right-sided drain within the lower abdominal wall   hematoma/collection which is decreased in size.  scar with skin   staples.  MUSCULOSKELETAL: Within normal limits    IMPRESSION:     Decreasing abdominal wall hematoma/collection with a drain in place. No   new acute findings.

## 2019-05-02 NOTE — CONSULT NOTE ADULT - ASSESSMENT
39 F with hx of severe preeclampsia, HTN, T2DM s/p C/S on 4/20 complicated by intraabdominal bleeding, now POD5 s/p exploratory laparotomy and evacuation of hemoperitoneum. Since surgery patient has been complaining from abdominal pain, with change in RLQ drain content and output, she is AFVSS, Wbc of 11.26, was started on IV antibiotic by Gyn team    Recs:  - Abdomen/Pelvis CT scan with IV and oral contrast (Utility of oral contrast was explained to the patient but she refuse to drink it)  - Cont IV antibiotic  - Further recs after CT scan

## 2019-05-02 NOTE — CONSULT NOTE ADULT - ATTENDING COMMENTS
Agree with above.  Her cultures were reviewed and she has had gram positive, gram negative and anaerobic bacteria in the past.  Broad coverage is indicated while the cultures are pending.  Can continue Aztreonam and start vancomycin and metronidazole.  Follow up cultures.  Recommend penicillin skin testing.  She should not breastfeed her infant until 24 hours after stopping the metronidazole.  She agrees to this.
Patient seen at bedside with Winston Francois and [place]. Patient was discharged after  and we were following her Insulin therapt. she only took Insulin the last two months of her pregnancy. Readmission was precipitated by a peritoneal bleed from her previous surgeries for hernia with mesh insertion. currently she still has not much appetite and glucoses are only minimally abnormal so will withhold standing Insulin and cover with moderate sliding scale Insulin. Will follow.

## 2019-05-02 NOTE — CONSULT NOTE ADULT - ASSESSMENT
38 yo s/p repeat C/S on 4/20 presenting with severe pain and copious discharge per abdominal wound 39 F with hx of severe preeclampsia, HTN, T2DM s/p C/S on 4/20 complicated by intraabdominal bleeding, now POD5 s/p exploratory laparotomy and evacuation of hemoperitoneum. Since surgery patient has been complaining from abdominal pain, with change in RLQ drain content and output, she is AFVSS, Wbc of 11.26.    -Recommend: IV Vancomycin 1500 mg q12; IV aztreonam 1000 mg q8; PO metronidazole 500 mg q8  -F/u culture  -PCN skin test  -Discontinue breastfeeding while on metronidazole  -ID will follow

## 2019-05-02 NOTE — PROGRESS NOTE ADULT - SUBJECTIVE AND OBJECTIVE BOX
Patient evaluated at bedside. she denies fever, chills. continue to complain of leakage from her incision site. ambulating, voiding, tolerating diabetic diet.     Physical Exam:  Vital Signs Last 24 Hrs  T(C): 36.6 (02 May 2019 06:36), Max: 37.4 (01 May 2019 19:28)  T(F): 97.9 (02 May 2019 06:36), Max: 99.4 (01 May 2019 19:28)  HR: 72 (02 May 2019 06:36) (71 - 90)  BP: 147/83 (02 May 2019 06:36) (120/79 - 153/76)  BP(mean): --  RR: 18 (02 May 2019 06:36) (17 - 18)  SpO2: 97% (02 May 2019 02:23) (97% - 100%)    GA: NAD, A+0 x 3  Abd: soft, nontender, nondistended, no rebound or guarding, incision clean, leakage of purulent fluid from incision at middle region, uterus firm at midline,  below the umbilicus,RLQ ABBIE drain with 15cc purulent fluid  : lochia WNL                              9.5    11.26 )-----------( 493      ( 01 May 2019 12:52 )             30.7

## 2019-05-03 LAB
ANION GAP SERPL CALC-SCNC: 13 MMOL/L — SIGNIFICANT CHANGE UP (ref 5–17)
BASOPHILS # BLD AUTO: 0.04 K/UL — SIGNIFICANT CHANGE UP (ref 0–0.2)
BASOPHILS NFR BLD AUTO: 0.4 % — SIGNIFICANT CHANGE UP (ref 0–2)
BUN SERPL-MCNC: 8 MG/DL — SIGNIFICANT CHANGE UP (ref 7–23)
CALCIUM SERPL-MCNC: 8.6 MG/DL — SIGNIFICANT CHANGE UP (ref 8.4–10.5)
CHLORIDE SERPL-SCNC: 104 MMOL/L — SIGNIFICANT CHANGE UP (ref 96–108)
CO2 SERPL-SCNC: 26 MMOL/L — SIGNIFICANT CHANGE UP (ref 22–31)
CREAT SERPL-MCNC: 0.83 MG/DL — SIGNIFICANT CHANGE UP (ref 0.5–1.3)
CULTURE RESULTS: SIGNIFICANT CHANGE UP
CULTURE RESULTS: SIGNIFICANT CHANGE UP
EOSINOPHIL # BLD AUTO: 0.25 K/UL — SIGNIFICANT CHANGE UP (ref 0–0.5)
EOSINOPHIL NFR BLD AUTO: 2.4 % — SIGNIFICANT CHANGE UP (ref 0–6)
GLUCOSE BLDC GLUCOMTR-MCNC: 108 MG/DL — HIGH (ref 70–99)
GLUCOSE BLDC GLUCOMTR-MCNC: 132 MG/DL — HIGH (ref 70–99)
GLUCOSE BLDC GLUCOMTR-MCNC: 136 MG/DL — HIGH (ref 70–99)
GLUCOSE BLDC GLUCOMTR-MCNC: 99 MG/DL — SIGNIFICANT CHANGE UP (ref 70–99)
GLUCOSE SERPL-MCNC: 112 MG/DL — HIGH (ref 70–99)
HCT VFR BLD CALC: 31.9 % — LOW (ref 34.5–45)
HGB BLD-MCNC: 10 G/DL — LOW (ref 11.5–15.5)
IMM GRANULOCYTES NFR BLD AUTO: 1.3 % — SIGNIFICANT CHANGE UP (ref 0–1.5)
LYMPHOCYTES # BLD AUTO: 2.45 K/UL — SIGNIFICANT CHANGE UP (ref 1–3.3)
LYMPHOCYTES # BLD AUTO: 23.5 % — SIGNIFICANT CHANGE UP (ref 13–44)
MCHC RBC-ENTMCNC: 27.1 PG — SIGNIFICANT CHANGE UP (ref 27–34)
MCHC RBC-ENTMCNC: 31.3 GM/DL — LOW (ref 32–36)
MCV RBC AUTO: 86.4 FL — SIGNIFICANT CHANGE UP (ref 80–100)
MONOCYTES # BLD AUTO: 1.07 K/UL — HIGH (ref 0–0.9)
MONOCYTES NFR BLD AUTO: 10.3 % — SIGNIFICANT CHANGE UP (ref 2–14)
NEUTROPHILS # BLD AUTO: 6.46 K/UL — SIGNIFICANT CHANGE UP (ref 1.8–7.4)
NEUTROPHILS NFR BLD AUTO: 62.1 % — SIGNIFICANT CHANGE UP (ref 43–77)
NRBC # BLD: 0 /100 WBCS — SIGNIFICANT CHANGE UP (ref 0–0)
PLATELET # BLD AUTO: 551 K/UL — HIGH (ref 150–400)
POTASSIUM SERPL-MCNC: 3.1 MMOL/L — LOW (ref 3.5–5.3)
POTASSIUM SERPL-SCNC: 3.1 MMOL/L — LOW (ref 3.5–5.3)
RBC # BLD: 3.69 M/UL — LOW (ref 3.8–5.2)
RBC # FLD: 15.7 % — HIGH (ref 10.3–14.5)
SODIUM SERPL-SCNC: 143 MMOL/L — SIGNIFICANT CHANGE UP (ref 135–145)
SPECIMEN SOURCE: SIGNIFICANT CHANGE UP
SPECIMEN SOURCE: SIGNIFICANT CHANGE UP
WBC # BLD: 10.41 K/UL — SIGNIFICANT CHANGE UP (ref 3.8–10.5)
WBC # FLD AUTO: 10.41 K/UL — SIGNIFICANT CHANGE UP (ref 3.8–10.5)

## 2019-05-03 PROCEDURE — 99232 SBSQ HOSP IP/OBS MODERATE 35: CPT

## 2019-05-03 RX ORDER — ENOXAPARIN SODIUM 100 MG/ML
40 INJECTION SUBCUTANEOUS DAILY
Qty: 0 | Refills: 0 | Status: DISCONTINUED | OUTPATIENT
Start: 2019-05-03 | End: 2019-05-08

## 2019-05-03 RX ORDER — MORPHINE SULFATE 50 MG/1
2 CAPSULE, EXTENDED RELEASE ORAL ONCE
Qty: 0 | Refills: 0 | Status: DISCONTINUED | OUTPATIENT
Start: 2019-05-03 | End: 2019-05-03

## 2019-05-03 RX ORDER — HYDROMORPHONE HYDROCHLORIDE 2 MG/ML
0.2 INJECTION INTRAMUSCULAR; INTRAVENOUS; SUBCUTANEOUS ONCE
Qty: 0 | Refills: 0 | Status: DISCONTINUED | OUTPATIENT
Start: 2019-05-03 | End: 2019-05-03

## 2019-05-03 RX ADMIN — Medication 600 MILLIGRAM(S): at 21:27

## 2019-05-03 RX ADMIN — Medication 600 MILLIGRAM(S): at 22:02

## 2019-05-03 RX ADMIN — OXYCODONE HYDROCHLORIDE 5 MILLIGRAM(S): 5 TABLET ORAL at 19:38

## 2019-05-03 RX ADMIN — Medication 300 MILLIGRAM(S): at 18:02

## 2019-05-03 RX ADMIN — OXYCODONE HYDROCHLORIDE 5 MILLIGRAM(S): 5 TABLET ORAL at 08:41

## 2019-05-03 RX ADMIN — OXYCODONE HYDROCHLORIDE 5 MILLIGRAM(S): 5 TABLET ORAL at 02:36

## 2019-05-03 RX ADMIN — ENOXAPARIN SODIUM 40 MILLIGRAM(S): 100 INJECTION SUBCUTANEOUS at 14:16

## 2019-05-03 RX ADMIN — HYDROMORPHONE HYDROCHLORIDE 0.2 MILLIGRAM(S): 2 INJECTION INTRAMUSCULAR; INTRAVENOUS; SUBCUTANEOUS at 10:30

## 2019-05-03 RX ADMIN — Medication 50 MILLIGRAM(S): at 22:18

## 2019-05-03 RX ADMIN — Medication 600 MILLIGRAM(S): at 07:35

## 2019-05-03 RX ADMIN — Medication 600 MILLIGRAM(S): at 14:17

## 2019-05-03 RX ADMIN — SIMETHICONE 80 MILLIGRAM(S): 80 TABLET, CHEWABLE ORAL at 09:55

## 2019-05-03 RX ADMIN — HYDROMORPHONE HYDROCHLORIDE 0.2 MILLIGRAM(S): 2 INJECTION INTRAMUSCULAR; INTRAVENOUS; SUBCUTANEOUS at 11:04

## 2019-05-03 RX ADMIN — HYDROMORPHONE HYDROCHLORIDE 0.2 MILLIGRAM(S): 2 INJECTION INTRAMUSCULAR; INTRAVENOUS; SUBCUTANEOUS at 09:55

## 2019-05-03 RX ADMIN — OXYCODONE HYDROCHLORIDE 5 MILLIGRAM(S): 5 TABLET ORAL at 01:48

## 2019-05-03 RX ADMIN — OXYCODONE HYDROCHLORIDE 5 MILLIGRAM(S): 5 TABLET ORAL at 07:35

## 2019-05-03 RX ADMIN — HYDROMORPHONE HYDROCHLORIDE 0.2 MILLIGRAM(S): 2 INJECTION INTRAMUSCULAR; INTRAVENOUS; SUBCUTANEOUS at 10:28

## 2019-05-03 RX ADMIN — Medication 100 MILLIGRAM(S): at 01:48

## 2019-05-03 RX ADMIN — OXYCODONE HYDROCHLORIDE 5 MILLIGRAM(S): 5 TABLET ORAL at 18:38

## 2019-05-03 RX ADMIN — Medication 600 MILLIGRAM(S): at 15:14

## 2019-05-03 RX ADMIN — Medication 600 MILLIGRAM(S): at 02:30

## 2019-05-03 RX ADMIN — Medication 500 MILLIGRAM(S): at 22:17

## 2019-05-03 RX ADMIN — Medication 300 MILLIGRAM(S): at 07:18

## 2019-05-03 RX ADMIN — Medication 50 MILLIGRAM(S): at 06:51

## 2019-05-03 RX ADMIN — OXYCODONE HYDROCHLORIDE 10 MILLIGRAM(S): 5 TABLET ORAL at 22:50

## 2019-05-03 RX ADMIN — Medication 0: at 12:15

## 2019-05-03 RX ADMIN — Medication 200 MILLIGRAM(S): at 06:37

## 2019-05-03 RX ADMIN — Medication 500 MILLIGRAM(S): at 06:37

## 2019-05-03 RX ADMIN — Medication 200 MILLIGRAM(S): at 18:02

## 2019-05-03 RX ADMIN — Medication 500 MILLIGRAM(S): at 14:17

## 2019-05-03 RX ADMIN — SIMETHICONE 80 MILLIGRAM(S): 80 TABLET, CHEWABLE ORAL at 01:48

## 2019-05-03 RX ADMIN — OXYCODONE HYDROCHLORIDE 10 MILLIGRAM(S): 5 TABLET ORAL at 22:18

## 2019-05-03 RX ADMIN — SIMETHICONE 80 MILLIGRAM(S): 80 TABLET, CHEWABLE ORAL at 18:02

## 2019-05-03 RX ADMIN — Medication 600 MILLIGRAM(S): at 08:42

## 2019-05-03 RX ADMIN — Medication 50 MILLIGRAM(S): at 14:16

## 2019-05-03 RX ADMIN — Medication 600 MILLIGRAM(S): at 01:50

## 2019-05-03 RX ADMIN — Medication 100 MILLIGRAM(S): at 14:17

## 2019-05-03 NOTE — PROGRESS NOTE ADULT - SUBJECTIVE AND OBJECTIVE BOX
INTERVAL HPI/OVERNIGHT EVENTS:    Pt seen after dressing change resulting in pain. ID now following for continued purulence from ABBIE drain and Abx changed. No WBC. Afebrile. Pt not eating much. Last night dinner had a few bites of hamburger, and had a muffin this morning. -165. Currently only on moderate SS.    Pt reports the following symptoms:    CONSTITUTIONAL:  Negative fever or chills, feels well  EYES:  Negative  blurry vision or double vision  CARDIOVASCULAR:  Negative for chest pain or palpitations  RESPIRATORY:  Negative for cough, wheezing, or SOB   GASTROINTESTINAL:  Negative for nausea, vomiting, diarrhea, constipation, or abdominal pain  GENITOURINARY:  Negative frequency, urgency or dysuria  NEUROLOGIC:  No headache, confusion, dizziness, lightheadedness    MEDICATIONS  (STANDING):  ALBUTerol/ipratropium for Nebulization 3 milliLiter(s) Nebulizer every 6 hours  aztreonam  IVPB 1000 milliGRAM(s) IV Intermittent every 8 hours  dextrose 5%. 1000 milliLiter(s) (50 mL/Hr) IV Continuous <Continuous>  dextrose 50% Injectable 12.5 Gram(s) IV Push once  dextrose 50% Injectable 25 Gram(s) IV Push once  dextrose 50% Injectable 25 Gram(s) IV Push once  docusate sodium 100 milliGRAM(s) Oral three times a day  enoxaparin Injectable 40 milliGRAM(s) SubCutaneous once  ibuprofen  Tablet. 600 milliGRAM(s) Oral every 6 hours  insulin lispro (HumaLOG) corrective regimen sliding scale   SubCutaneous Before meals and at bedtime  iohexol 300 mG (iodine)/mL Oral Solution 30 milliLiter(s) Oral once  labetalol 200 milliGRAM(s) Oral every 12 hours  lactated ringers. 1000 milliLiter(s) (125 mL/Hr) IV Continuous <Continuous>  metroNIDAZOLE    Tablet 500 milliGRAM(s) Oral every 8 hours  simethicone 80 milliGRAM(s) Chew every 8 hours  vancomycin  IVPB      vancomycin  IVPB 1500 milliGRAM(s) IV Intermittent every 12 hours    MEDICATIONS  (PRN):  ALBUTerol    90 MICROgram(s) HFA Inhaler 2 Puff(s) Inhalation every 6 hours PRN Shortness of Breath and/or Wheezing  dextrose 40% Gel 15 Gram(s) Oral once PRN Blood Glucose LESS THAN 70 milliGRAM(s)/deciliter  diphenhydrAMINE 50 milliGRAM(s) Oral every 4 hours PRN Rash and/or Itching  glucagon  Injectable 1 milliGRAM(s) IntraMuscular once PRN Glucose LESS THAN 70 milligrams/deciliter  metoclopramide Injectable 10 milliGRAM(s) IV Push every 6 hours PRN Nausea and/or Vomiting -2nd Line  ondansetron Injectable 4 milliGRAM(s) IV Push every 6 hours PRN Nausea and/or Vomiting 1st line  oxyCODONE    IR 5 milliGRAM(s) Oral every 4 hours PRN Mild Pain (1 - 3)  oxyCODONE    IR 10 milliGRAM(s) Oral every 6 hours PRN Severe Pain (7 - 10)  zinc oxide 20% Ointment 1 Application(s) Topical every 6 hours PRN skin blisters  zinc oxide 40% Ointment 1 Application(s) Topical every 4 hours PRN skin abrasion      PHYSICAL EXAM  Vital Signs Last 24 Hrs  T(C): 36.8 (03 May 2019 10:09), Max: 37.2 (02 May 2019 22:00)  T(F): 98.2 (03 May 2019 10:09), Max: 99 (02 May 2019 22:00)  HR: 72 (03 May 2019 10:09) (72 - 96)  BP: 131/81 (03 May 2019 10:09) (120/71 - 141/75)  BP(mean): --  RR: 14 (03 May 2019 10:09) (14 - 18)  SpO2: 98% (03 May 2019 10:09) (97% - 100%)    Constitutional: wn/wd in NAD.   HEENT: NCAT, OP clear, EOMI, no proptosis or lid retraction  Neck: no thyromegaly or palpable thyroid nodules   Respiratory: lungs CTAB.  Cardiovascular: regular rhythm, normal S1 and S2, no audible murmurs, no peripheral edema  GI: soft, NT/ND, no masses/HSM appreciated.  Neurology: no tremors, DTR 2+  Skin: surgical incision without surrounding erythema or purulent discharge.   Psychiatric: AAO x 3, normal affect/mood.    LABS:                        10.0   10.41 )-----------( 551      ( 03 May 2019 07:29 )             31.9     05-03    143  |  104  |  8   ----------------------------<  112<H>  3.1<L>   |  26  |  0.83    Ca    8.6      03 May 2019 07:29    HbA1C: 5.5 % ( @ 12:52)  6.0 % ( @ 11:31)    CAPILLARY BLOOD GLUCOSE  POCT Blood Glucose.: 132 mg/dL (03 May 2019 12:01)  POCT Blood Glucose.: 108 mg/dL (03 May 2019 07:02)  POCT Blood Glucose.: 165 mg/dL (02 May 2019 22:23)  POCT Blood Glucose.: 112 mg/dL (02 May 2019 17:58)    Will Discuss in rounds.  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     -Continue moderate SS at this point.     Pt can follow up at discharge with [facility] by calling  to make an appointment.   Will discuss case with Dr. Bowden  and update primary team

## 2019-05-03 NOTE — PROGRESS NOTE ADULT - ASSESSMENT
38yo with hx of severe preeclampsia, HTN, T2DM s/p C/S#3 on 4/20 presenting for severe abdominal pain, concern for intraabdominal bleeding, now POD5 s/p exploratory laparotomy and evacuation of hemoperitoneum, stable. Incision now with persistent purulent drainage. ABBIE drain fell out overnight. Pt remains afebrile and WBC normal. ID consulted, recommend vancomycin 15mg/kg q8 and metronidazole PO 500mg q8. PCN allergy testing in AM. Gen surg signed off, no intervention at this time. Wound culture growing enterococcus. Bedside wet-to-dry dressing performed this morning, change 1-2 times a day  1. Pain: Motrin ATC, , oxycodone prn, zinc oxide prn skin blisters  2. CV: labetalol 200 BID for HTN. asymptomatic  3. Pulm: albuterol prn for wheezing.  3. GI: diabetic DASH, simethicone, colace   3. : voiding  4. DVT prophylaxis: restart lovenox,  SCDs, ambulation  5. Endo: continue moderate ISS per endocrine  6. Heme: Hgb stable s/p 2u PRBC. AM labs  8. ID: Aztreonam 1g q8, vancomycin 1500mg IV q12, PO metronidazole 500mg q8.  s/p clindamycin (5/1-2), f/u ID recs, f/u drain culture  7. Dispo: pending stable clinical status

## 2019-05-03 NOTE — PROGRESS NOTE ADULT - SUBJECTIVE AND OBJECTIVE BOX
Interval events: ABBIE drain fell out overnight. Attending to attending conversation between Dr. Mast and Dr. Hurtado gen surg. Decision made not to take patient back to OR as her incision is improving. Decision made to remove staples from incision and place wet-to-dry dressing.    Patient evaluated at bedside with attending. Patient reports pain at her incision site. Denies fever, chills. Continues to report copious drainage of purulent fluid. She is ambulating, voiding, tolerating diabetic diet.     Physical Exam:  Vital Signs Last 24 Hrs  T(C): 36.8 (03 May 2019 10:09), Max: 37.2 (02 May 2019 22:00)  T(F): 98.2 (03 May 2019 10:09), Max: 99 (02 May 2019 22:00)  HR: 72 (03 May 2019 10:09) (72 - 96)  BP: 131/81 (03 May 2019 10:09) (120/71 - 141/75)  BP(mean): --  RR: 14 (03 May 2019 10:09) (14 - 18)  SpO2: 98% (03 May 2019 10:09) (97% - 100%)    GA: NAD, A+0 x 3  Abd: soft, tenderness to palpation around incision, nondistended, no rebound or guarding, staples removed from incision, incision appears well healed on left and right. 3 cm opening right midline. opening probed fascia intact. opening irrigated with sterile water, healthy appearing granulation tissue around incision edges. opening packed with wet-to-dry dressing and bandaged with abdominal pad.  : lochia WNL  Extremities: no calf tenderness                            10.0   10.41 )-----------( 551      ( 03 May 2019 07:29 )             31.9     05-03    143  |  104  |  8   ----------------------------<  112<H>  3.1<L>   |  26  |  0.83    Ca    8.6      03 May 2019 07:29

## 2019-05-03 NOTE — PROGRESS NOTE ADULT - SUBJECTIVE AND OBJECTIVE BOX
Patient evaluated at bedside. She reports pain is improving at incision site. denies fever, chills. reports decreasing drainage from incision.  She has been ambulating without assistance, voiding spontaneously, passing gas, tolerating regular diet.    Physical Exam:  Vital Signs Last 24 Hrs  T(C): 36.9 (03 May 2019 15:00), Max: 37.2 (02 May 2019 22:00)  T(F): 98.5 (03 May 2019 15:00), Max: 99 (02 May 2019 22:00)  HR: 84 (03 May 2019 15:00) (72 - 96)  BP: 156/82 (03 May 2019 15:00) (120/71 - 156/82)  BP(mean): --  RR: 19 (03 May 2019 15:00) (14 - 19)  SpO2: 100% (03 May 2019 15:00) (97% - 100%)    GA: NAD, A+0 x 3  Abd: soft, nontender, nondistended, no rebound or guarding, incision bandaged small amount of blood tinged purulent drainage on guaze.  : lochia WNL  Extremities: no calf tenderness                            10.0   10.41 )-----------( 551      ( 03 May 2019 07:29 )             31.9     05-03    143  |  104  |  8   ----------------------------<  112<H>  3.1<L>   |  26  |  0.83    Ca    8.6      03 May 2019 07:29

## 2019-05-03 NOTE — PROGRESS NOTE ADULT - ASSESSMENT
39 F with hx of severe preeclampsia, HTN, T2DM s/p C/S on 4/20 complicated by intraabdominal bleeding, s/p exploratory laparotomy and evacuation of hemoperitoneum. Since surgery patient has been complaining of abdominal pain, with change in discharge content and output from incision.    -C/w IV Vancomycin 1500 mg q12; IV aztreonam 1000 mg q8; PO metronidazole 500 mg q8  -F/u culture  -PCN skin test  -Discontinue breastfeeding while on metronidazole 39 F with hx of severe preeclampsia, HTN, T2DM s/p C/S on 4/20 complicated by intraabdominal bleeding, s/p exploratory laparotomy and evacuation of hemoperitoneum. Since surgery patient has been complaining of abdominal pain, with change in discharge content and output from incision.    -C/w IV Vancomycin 1500 mg q12; IV aztreonam 1000 mg q8; PO metronidazole 500 mg q8.  Check vanco trough 30 minutes prior to 4th dose   -F/u culture  -PCN skin test  -Discontinue breastfeeding while on metronidazole

## 2019-05-03 NOTE — PROGRESS NOTE ADULT - ASSESSMENT
38yo with hx of severe preeclampsia, HTN, T2DM s/p C/S#3 on 4/20 presenting for severe abdominal pain, concern for intraabdominal bleeding, now POD5 s/p exploratory laparotomy and evacuation of hemoperitoneum, stable. Incision now with persistent purulent drainage. ABBIE drain fell out overnight. Pt remains afebrile and WBC normal. ID consulted, recommend vancomycin 15mg/kg q8 and metronidazole PO 500mg q8. PCN allergy testing in AM. Gen surg signed off, no intervention at this time. Wound culture growing enterococcus. Bedside wet-to-dry dressing performed.  change 1-2 times a day  1. Pain: Motrin ATC, , oxycodone prn, zinc oxide prn skin blisters  2. CV: labetalol 200 BID for HTN. asymptomatic  3. Pulm: albuterol prn for wheezing.  3. GI: diabetic DASH, simethicone, colace   3. : voiding  4. DVT prophylaxis: restart lovenox,  SCDs, ambulation  5. Endo: continue moderate ISS per endocrine  6. Heme: Hgb stable s/p 2u PRBC. AM labs  8. ID: Aztreonam 1g q8, vancomycin 1500mg IV q12, PO metronidazole 500mg q8.  s/p clindamycin (5/1-2), f/u ID recs, f/u drain culture  7. Dispo: pending stable clinical status

## 2019-05-03 NOTE — PROGRESS NOTE ADULT - SUBJECTIVE AND OBJECTIVE BOX
INTERVAL HPI/OVERNIGHT EVENTS: Patient seen and evaluated bedside. Found to be resting comfortably in bed in NAD. Patient reports tolerating antibiotics well. Patient accidently removed drain from surgical wound. She endorses improvement in wound in terms of discharge and malodor. Denies n/v/f/c.    CONSTITUTIONAL:  Negative fever or chills, feels well, good appetite  EYES:  Negative  blurry vision or double vision  CARDIOVASCULAR:  Negative for chest pain or palpitations  RESPIRATORY:  Negative for cough, wheezing, or SOB   GASTROINTESTINAL:  Negative for nausea, vomiting, diarrhea, constipation, or abdominal pain  GENITOURINARY:  Negative frequency, urgency or dysuria  NEUROLOGIC:  No headache, confusion, dizziness, lightheadedness      ANTIBIOTICS/RELEVANT:    MEDICATIONS  (STANDING):  ALBUTerol/ipratropium for Nebulization 3 milliLiter(s) Nebulizer every 6 hours  aztreonam  IVPB 1000 milliGRAM(s) IV Intermittent every 8 hours  dextrose 5%. 1000 milliLiter(s) (50 mL/Hr) IV Continuous <Continuous>  dextrose 50% Injectable 12.5 Gram(s) IV Push once  dextrose 50% Injectable 25 Gram(s) IV Push once  dextrose 50% Injectable 25 Gram(s) IV Push once  docusate sodium 100 milliGRAM(s) Oral three times a day  enoxaparin Injectable 40 milliGRAM(s) SubCutaneous once  ibuprofen  Tablet. 600 milliGRAM(s) Oral every 6 hours  insulin lispro (HumaLOG) corrective regimen sliding scale   SubCutaneous Before meals and at bedtime  iohexol 300 mG (iodine)/mL Oral Solution 30 milliLiter(s) Oral once  labetalol 200 milliGRAM(s) Oral every 12 hours  lactated ringers. 1000 milliLiter(s) (125 mL/Hr) IV Continuous <Continuous>  metroNIDAZOLE    Tablet 500 milliGRAM(s) Oral every 8 hours  simethicone 80 milliGRAM(s) Chew every 8 hours  vancomycin  IVPB      vancomycin  IVPB 1500 milliGRAM(s) IV Intermittent every 12 hours    MEDICATIONS  (PRN):  ALBUTerol    90 MICROgram(s) HFA Inhaler 2 Puff(s) Inhalation every 6 hours PRN Shortness of Breath and/or Wheezing  dextrose 40% Gel 15 Gram(s) Oral once PRN Blood Glucose LESS THAN 70 milliGRAM(s)/deciliter  diphenhydrAMINE 50 milliGRAM(s) Oral every 4 hours PRN Rash and/or Itching  glucagon  Injectable 1 milliGRAM(s) IntraMuscular once PRN Glucose LESS THAN 70 milligrams/deciliter  metoclopramide Injectable 10 milliGRAM(s) IV Push every 6 hours PRN Nausea and/or Vomiting -2nd Line  ondansetron Injectable 4 milliGRAM(s) IV Push every 6 hours PRN Nausea and/or Vomiting 1st line  oxyCODONE    IR 5 milliGRAM(s) Oral every 4 hours PRN Mild Pain (1 - 3)  oxyCODONE    IR 10 milliGRAM(s) Oral every 6 hours PRN Severe Pain (7 - 10)  zinc oxide 20% Ointment 1 Application(s) Topical every 6 hours PRN skin blisters  zinc oxide 40% Ointment 1 Application(s) Topical every 4 hours PRN skin abrasion        Vital Signs Last 24 Hrs  T(C): 36.8 (03 May 2019 10:09), Max: 37.2 (02 May 2019 22:00)  T(F): 98.2 (03 May 2019 10:09), Max: 99 (02 May 2019 22:00)  HR: 72 (03 May 2019 10:09) (72 - 96)  BP: 131/81 (03 May 2019 10:09) (120/71 - 141/75)  BP(mean): --  RR: 14 (03 May 2019 10:09) (14 - 18)  SpO2: 98% (03 May 2019 10:09) (97% - 100%)    PHYSICAL EXAM:  Constitutional:Well-developed, well nourished  Eyes:NAOMI, EOMI  Ear/Nose/Throat: no oral lesion, no sinus tenderness on percussion	  Neck:no JVD, no lymphadenopathy, supple  Respiratory: CTA cristi  Cardiovascular: S1S2 RRR, no murmurs  Gastrointestinal:soft, (+) BS, no HSM, Pfannenstiel incision with mild surrounding erythema, improving and murky discharge, no fluctuance, malodor improving  Extremities:no e/e/c  Vascular: DP Pulse:	right normal; left normal      LABS:                        10.0   10.41 )-----------( 551      ( 03 May 2019 07:29 )             31.9     05-03    143  |  104  |  8   ----------------------------<  112<H>  3.1<L>   |  26  |  0.83    Ca    8.6      03 May 2019 07:29            MICROBIOLOGY:    Culture - Body Fluid with Gram Stain (collected 02 May 2019 09:10)  Source: .Body Fluid abdominal fluid  Gram Stain (02 May 2019 17:51):    Numerous Gram positive cocci in pairs    Numerous white blood cells  Preliminary Report (03 May 2019 11:55):    Few Proteus mirabilis    Moderate Enterococcus faecalis    Few Streptococcus anginosus    Susceptibility to follow.      RADIOLOGY & ADDITIONAL STUDIES:

## 2019-05-04 LAB
-  AMPICILLIN/SULBACTAM: SIGNIFICANT CHANGE UP
-  AMPICILLIN: SIGNIFICANT CHANGE UP
-  AMPICILLIN: SIGNIFICANT CHANGE UP
-  AZTREONAM: SIGNIFICANT CHANGE UP
-  CEFAZOLIN: SIGNIFICANT CHANGE UP
-  CEFTRIAXONE: SIGNIFICANT CHANGE UP
-  CEFTRIAXONE: SIGNIFICANT CHANGE UP
-  CLINDAMYCIN: SIGNIFICANT CHANGE UP
-  ERYTHROMYCIN: SIGNIFICANT CHANGE UP
-  GENTAMICIN: SIGNIFICANT CHANGE UP
-  LEVOFLOXACIN: SIGNIFICANT CHANGE UP
-  PENICILLIN: SIGNIFICANT CHANGE UP
-  PIPERACILLIN/TAZOBACTAM: SIGNIFICANT CHANGE UP
-  TOBRAMYCIN: SIGNIFICANT CHANGE UP
-  TRIMETHOPRIM/SULFAMETHOXAZOLE: SIGNIFICANT CHANGE UP
-  VANCOMYCIN: SIGNIFICANT CHANGE UP
-  VANCOMYCIN: SIGNIFICANT CHANGE UP
CULTURE RESULTS: SIGNIFICANT CHANGE UP
GLUCOSE BLDC GLUCOMTR-MCNC: 101 MG/DL — HIGH (ref 70–99)
GLUCOSE BLDC GLUCOMTR-MCNC: 127 MG/DL — HIGH (ref 70–99)
GLUCOSE BLDC GLUCOMTR-MCNC: 129 MG/DL — HIGH (ref 70–99)
GLUCOSE BLDC GLUCOMTR-MCNC: 157 MG/DL — HIGH (ref 70–99)
METHOD TYPE: SIGNIFICANT CHANGE UP
ORGANISM # SPEC MICROSCOPIC CNT: SIGNIFICANT CHANGE UP
SPECIMEN SOURCE: SIGNIFICANT CHANGE UP
VANCOMYCIN TROUGH SERPL-MCNC: 13.6 UG/ML — SIGNIFICANT CHANGE UP (ref 10–20)

## 2019-05-04 PROCEDURE — 99232 SBSQ HOSP IP/OBS MODERATE 35: CPT

## 2019-05-04 RX ADMIN — SIMETHICONE 80 MILLIGRAM(S): 80 TABLET, CHEWABLE ORAL at 02:35

## 2019-05-04 RX ADMIN — Medication 300 MILLIGRAM(S): at 09:48

## 2019-05-04 RX ADMIN — Medication 50 MILLIGRAM(S): at 22:18

## 2019-05-04 RX ADMIN — ONDANSETRON 4 MILLIGRAM(S): 8 TABLET, FILM COATED ORAL at 07:04

## 2019-05-04 RX ADMIN — Medication 600 MILLIGRAM(S): at 23:20

## 2019-05-04 RX ADMIN — OXYCODONE HYDROCHLORIDE 10 MILLIGRAM(S): 5 TABLET ORAL at 22:39

## 2019-05-04 RX ADMIN — Medication 200 MILLIGRAM(S): at 18:48

## 2019-05-04 RX ADMIN — Medication 100 MILLIGRAM(S): at 22:30

## 2019-05-04 RX ADMIN — SIMETHICONE 80 MILLIGRAM(S): 80 TABLET, CHEWABLE ORAL at 18:49

## 2019-05-04 RX ADMIN — OXYCODONE HYDROCHLORIDE 10 MILLIGRAM(S): 5 TABLET ORAL at 06:32

## 2019-05-04 RX ADMIN — Medication 600 MILLIGRAM(S): at 03:51

## 2019-05-04 RX ADMIN — Medication 600 MILLIGRAM(S): at 02:35

## 2019-05-04 RX ADMIN — Medication 100 MILLIGRAM(S): at 14:33

## 2019-05-04 RX ADMIN — Medication 200 MILLIGRAM(S): at 06:23

## 2019-05-04 RX ADMIN — ENOXAPARIN SODIUM 40 MILLIGRAM(S): 100 INJECTION SUBCUTANEOUS at 12:57

## 2019-05-04 RX ADMIN — Medication 10 MILLIGRAM(S): at 09:58

## 2019-05-04 RX ADMIN — OXYCODONE HYDROCHLORIDE 10 MILLIGRAM(S): 5 TABLET ORAL at 23:15

## 2019-05-04 RX ADMIN — Medication 600 MILLIGRAM(S): at 11:50

## 2019-05-04 RX ADMIN — OXYCODONE HYDROCHLORIDE 5 MILLIGRAM(S): 5 TABLET ORAL at 11:50

## 2019-05-04 RX ADMIN — Medication 600 MILLIGRAM(S): at 11:00

## 2019-05-04 RX ADMIN — Medication 50 MILLIGRAM(S): at 06:23

## 2019-05-04 RX ADMIN — Medication 600 MILLIGRAM(S): at 22:37

## 2019-05-04 RX ADMIN — Medication 500 MILLIGRAM(S): at 06:23

## 2019-05-04 RX ADMIN — OXYCODONE HYDROCHLORIDE 10 MILLIGRAM(S): 5 TABLET ORAL at 07:02

## 2019-05-04 RX ADMIN — OXYCODONE HYDROCHLORIDE 5 MILLIGRAM(S): 5 TABLET ORAL at 10:58

## 2019-05-04 RX ADMIN — Medication 600 MILLIGRAM(S): at 17:07

## 2019-05-04 RX ADMIN — Medication 500 MILLIGRAM(S): at 22:20

## 2019-05-04 RX ADMIN — Medication 500 MILLIGRAM(S): at 14:40

## 2019-05-04 RX ADMIN — Medication 2: at 22:20

## 2019-05-04 RX ADMIN — Medication 50 MILLIGRAM(S): at 14:33

## 2019-05-04 NOTE — PROGRESS NOTE ADULT - ASSESSMENT
40yo with hx of severe preeclampsia, HTN, T2DM s/p C/S#3 on 4/20 presenting for severe abdominal pain, concern for intraabdominal bleeding, now POD6 s/p exploratory laparotomy and evacuation of hemoperitoneum, stable. Incision now with persistent purulent drainage. ABBIE drain fell out overnight. Pt remains afebrile and WBC normal. ID consulted, recommend vancomycin 15mg/kg q8 and metronidazole PO 500mg q8. PCN allergy testing in AM. Gen surg signed off, no intervention at this time. Wound culture growing enterococcus. Bedside wet-to-dry dressings, change 1-2 times a day  1. Pain: Motrin ATC, , oxycodone prn, zinc oxide prn skin blisters  2. CV: labetalol 200 BID for HTN. asymptomatic  3. Pulm: albuterol prn for wheezing.  3. GI: diabetic DASH, simethicone, colace   3. : voiding  4. DVT prophylaxis: lovenox,  SCDs, ambulation  5. Endo: continue moderate ISS per endocrine  6. Heme: Hgb stable s/p 2u PRBC. AM labs  8. ID: Aztreonam 1g q8, vancomycin 1500mg IV q12, PO metronidazole 500mg q8.  s/p clindamycin (5/1-2), f/u ID recs, f/u drain culture  7. Dispo: pending stable clinical status

## 2019-05-04 NOTE — PROGRESS NOTE ADULT - SUBJECTIVE AND OBJECTIVE BOX
Patient evaluated at bedside for AM rounds.  Reports having a difficult night though no change from usual.  Complains of discomfort from hematoma at prior IV site on right hand which has been present for several days.  She feels irritable this morning and feels it is related to her sugar - vomited AM dose of Flagyl.  Giving zofran and will check fingerstick.  Orange juice at bedside.      Overnight incisional dressing changed x1 due to saturated gauze    Physical Exam:  Vital Signs Last 24 Hrs  T(C): 36.7 (04 May 2019 06:19), Max: 37 (03 May 2019 18:00)  T(F): 98.1 (04 May 2019 06:19), Max: 98.6 (03 May 2019 18:00)  HR: 92 (04 May 2019 06:19) (72 - 98)  BP: 149/87 (04 May 2019 06:19) (124/77 - 169/90)  BP(mean): --  RR: 18 (04 May 2019 06:19) (14 - 19)  SpO2: 100% (04 May 2019 06:19) (98% - 100%)    GA: NAD, A+0 x 3  Abd: + BS, soft, nontender, nondistended, incision with 3-4cm central defect, no active drainage though gauze saturated with purulent fluid.  Mildly erythematous around periphery  : lochia WNL  Extremities: no swelling or calf tenderness.  Dorsal surface of right hand with edema, appears to be secondary to resolving hematoma                            10.0   10.41 )-----------( 551      ( 03 May 2019 07:29 )             31.9     05-03    143  |  104  |  8   ----------------------------<  112<H>  3.1<L>   |  26  |  0.83    Ca    8.6      03 May 2019 07:29

## 2019-05-04 NOTE — PROGRESS NOTE ADULT - ASSESSMENT
IMPRESSION:  Abdominal fluid collection/abscess.  Clinically improving.  Sensitivities have been resulting.  Strep is sensitive to Vanco and Proteus is sensitive to Aztreonam.  Awaiting Enterococcus     Recommend:  1.  Continue current antibiotics.  If there are no anaerobes in culture will stop metronidazole  2.  Follow up penicillin skin testing.  If she is not penicillin-allergic can switch her to an oral penicillin/cephalosporin based regimen. Otherwise she may need to be discharged on a fluoroquinolone which means she won't be able to breastfeed for the near future    ID team 1 will follow

## 2019-05-04 NOTE — PROGRESS NOTE ADULT - SUBJECTIVE AND OBJECTIVE BOX
Pt seen at bedside for dressing change- reports feeling pain around incision, denies fever/chills, abdomen soft, NTND, no rebound or guarding, dressing removed and incision inspected, no surrounding erythema or purulent malodorous discharge noted, no fluctuance, irrigated wound with saline, gauze and abd pad applied  continue IV antibiotics

## 2019-05-04 NOTE — PROGRESS NOTE ADULT - SUBJECTIVE AND OBJECTIVE BOX
INTERVAL HPI/OVERNIGHT EVENTS:    Patient is a 39y old  Female who presents with a chief complaint of wound complication (04 May 2019 12:36)  No acute overnight events  pt reports eating well  observed to have tray of food as well as outside food brought in by   reports intermittent nasuea and vomiting.   insulin administration and requirements reviewed.     Pt reports the following symptoms:    CONSTITUTIONAL:  Negative fever or chills, feels well, good appetite  EYES:  Negative  blurry vision or double vision  CARDIOVASCULAR:  Negative for chest pain or palpitations  RESPIRATORY:  Negative for cough, wheezing, or SOB   GASTROINTESTINAL:  Negative for nausea, vomiting, diarrhea, constipation, or abdominal pain  GENITOURINARY:  Negative frequency, urgency or dysuria  NEUROLOGIC:  No headache, confusion, dizziness, lightheadedness    MEDICATIONS  (STANDING):  ALBUTerol/ipratropium for Nebulization 3 milliLiter(s) Nebulizer every 6 hours  aztreonam  IVPB 1000 milliGRAM(s) IV Intermittent every 8 hours  dextrose 5%. 1000 milliLiter(s) (50 mL/Hr) IV Continuous <Continuous>  dextrose 50% Injectable 12.5 Gram(s) IV Push once  dextrose 50% Injectable 25 Gram(s) IV Push once  dextrose 50% Injectable 25 Gram(s) IV Push once  docusate sodium 100 milliGRAM(s) Oral three times a day  enoxaparin Injectable 40 milliGRAM(s) SubCutaneous daily  ibuprofen  Tablet. 600 milliGRAM(s) Oral every 6 hours  insulin lispro (HumaLOG) corrective regimen sliding scale   SubCutaneous Before meals and at bedtime  iohexol 300 mG (iodine)/mL Oral Solution 30 milliLiter(s) Oral once  labetalol 200 milliGRAM(s) Oral every 12 hours  lactated ringers. 1000 milliLiter(s) (125 mL/Hr) IV Continuous <Continuous>  metroNIDAZOLE    Tablet 500 milliGRAM(s) Oral every 8 hours  simethicone 80 milliGRAM(s) Chew every 8 hours  vancomycin  IVPB      vancomycin  IVPB 1500 milliGRAM(s) IV Intermittent every 12 hours    MEDICATIONS  (PRN):  ALBUTerol    90 MICROgram(s) HFA Inhaler 2 Puff(s) Inhalation every 6 hours PRN Shortness of Breath and/or Wheezing  dextrose 40% Gel 15 Gram(s) Oral once PRN Blood Glucose LESS THAN 70 milliGRAM(s)/deciliter  glucagon  Injectable 1 milliGRAM(s) IntraMuscular once PRN Glucose LESS THAN 70 milligrams/deciliter  metoclopramide Injectable 10 milliGRAM(s) IV Push every 6 hours PRN Nausea and/or Vomiting -2nd Line  ondansetron Injectable 4 milliGRAM(s) IV Push every 6 hours PRN Nausea and/or Vomiting 1st line  oxyCODONE    IR 5 milliGRAM(s) Oral every 4 hours PRN Mild Pain (1 - 3)  oxyCODONE    IR 10 milliGRAM(s) Oral every 6 hours PRN Severe Pain (7 - 10)  zinc oxide 20% Ointment 1 Application(s) Topical every 6 hours PRN skin blisters  zinc oxide 40% Ointment 1 Application(s) Topical every 4 hours PRN skin abrasion      Past medical history reviewed  Family history reviewed  Social history reviewed    PHYSICAL EXAM  Vital Signs Last 24 Hrs  T(C): 36.8 (04 May 2019 14:00), Max: 37 (03 May 2019 18:00)  T(F): 98.2 (04 May 2019 14:00), Max: 98.6 (03 May 2019 18:00)  HR: 90 (04 May 2019 14:00) (74 - 98)  BP: 117/80 (04 May 2019 14:00) (117/80 - 169/90)  BP(mean): --  RR: 18 (04 May 2019 14:00) (17 - 18)  SpO2: 100% (04 May 2019 14:00) (99% - 100%)    Constitutional: wn/wd in NAD.   HEENT: NCAT, MMM, OP clear, EOMI, no proptosis or lid retraction  Neck: no thyromegaly or palpable thyroid nodules   Respiratory: lungs CTAB.  Cardiovascular: regular rhythm, normal S1 and S2, no audible murmurs, no peripheral edema  GI: soft, NT/ND, no masses/HSM appreciated.  Neurology: no tremors, DTR 2+  Skin: no visible rashes/lesions  Psychiatric: AAO x 3, normal affect/mood.    LABS:                        10.0   10.41 )-----------( 551      ( 03 May 2019 07:29 )             31.9     05-03    143  |  104  |  8   ----------------------------<  112<H>  3.1<L>   |  26  |  0.83    Ca    8.6      03 May 2019 07:29              HbA1C: 5.5 % (05-01 @ 12:52)  6.0 % (04-23 @ 11:31)    CAPILLARY BLOOD GLUCOSE      POCT Blood Glucose.: 101 mg/dL (04 May 2019 16:04)  POCT Blood Glucose.: 127 mg/dL (04 May 2019 11:49)  POCT Blood Glucose.: 129 mg/dL (04 May 2019 07:07)  POCT Blood Glucose.: 99 mg/dL (03 May 2019 22:33)  POCT Blood Glucose.: 136 mg/dL (03 May 2019 18:04)

## 2019-05-04 NOTE — PROGRESS NOTE ADULT - SUBJECTIVE AND OBJECTIVE BOX
INTERVAL HPI/OVERNIGHT EVENTS:    Patient was seen and examined at bedside.  Vomited up her metronidazole dose this AM. Reports the drainage has decreased from her wound    CONSTITUTIONAL:  Negative fever or chills, feels well, good appetite  EYES:  Negative  blurry vision or double vision  CARDIOVASCULAR:  Negative for chest pain or palpitations  RESPIRATORY:  Negative for cough, wheezing, or SOB   GASTROINTESTINAL:  Negative for nausea, + vomiting, no diarrhea, constipation, or abdominal pain  GENITOURINARY:  Negative frequency, urgency or dysuria  NEUROLOGIC:  No headache, confusion, dizziness, lightheadedness      ANTIBIOTICS/RELEVANT:    MEDICATIONS  (STANDING):  ALBUTerol/ipratropium for Nebulization 3 milliLiter(s) Nebulizer every 6 hours  aztreonam  IVPB 1000 milliGRAM(s) IV Intermittent every 8 hours  dextrose 5%. 1000 milliLiter(s) (50 mL/Hr) IV Continuous <Continuous>  dextrose 50% Injectable 12.5 Gram(s) IV Push once  dextrose 50% Injectable 25 Gram(s) IV Push once  dextrose 50% Injectable 25 Gram(s) IV Push once  docusate sodium 100 milliGRAM(s) Oral three times a day  enoxaparin Injectable 40 milliGRAM(s) SubCutaneous daily  ibuprofen  Tablet. 600 milliGRAM(s) Oral every 6 hours  insulin lispro (HumaLOG) corrective regimen sliding scale   SubCutaneous Before meals and at bedtime  iohexol 300 mG (iodine)/mL Oral Solution 30 milliLiter(s) Oral once  labetalol 200 milliGRAM(s) Oral every 12 hours  lactated ringers. 1000 milliLiter(s) (125 mL/Hr) IV Continuous <Continuous>  metroNIDAZOLE    Tablet 500 milliGRAM(s) Oral every 8 hours  simethicone 80 milliGRAM(s) Chew every 8 hours  vancomycin  IVPB      vancomycin  IVPB 1500 milliGRAM(s) IV Intermittent every 12 hours    MEDICATIONS  (PRN):  ALBUTerol    90 MICROgram(s) HFA Inhaler 2 Puff(s) Inhalation every 6 hours PRN Shortness of Breath and/or Wheezing  dextrose 40% Gel 15 Gram(s) Oral once PRN Blood Glucose LESS THAN 70 milliGRAM(s)/deciliter  glucagon  Injectable 1 milliGRAM(s) IntraMuscular once PRN Glucose LESS THAN 70 milligrams/deciliter  metoclopramide Injectable 10 milliGRAM(s) IV Push every 6 hours PRN Nausea and/or Vomiting -2nd Line  ondansetron Injectable 4 milliGRAM(s) IV Push every 6 hours PRN Nausea and/or Vomiting 1st line  oxyCODONE    IR 5 milliGRAM(s) Oral every 4 hours PRN Mild Pain (1 - 3)  oxyCODONE    IR 10 milliGRAM(s) Oral every 6 hours PRN Severe Pain (7 - 10)  zinc oxide 20% Ointment 1 Application(s) Topical every 6 hours PRN skin blisters  zinc oxide 40% Ointment 1 Application(s) Topical every 4 hours PRN skin abrasion        Vital Signs Last 24 Hrs  T(C): 36.4 (04 May 2019 11:00), Max: 37 (03 May 2019 18:00)  T(F): 97.6 (04 May 2019 11:00), Max: 98.6 (03 May 2019 18:00)  HR: 74 (04 May 2019 11:00) (74 - 98)  BP: 143/82 (04 May 2019 11:00) (124/77 - 169/90)  BP(mean): --  RR: 18 (04 May 2019 11:00) (17 - 19)  SpO2: 100% (04 May 2019 11:00) (99% - 100%)    PHYSICAL EXAM:  Constitutional:Well-developed, well nourished  Eyes:NAOMI, EOMI  Ear/Nose/Throat: no oral lesion, no sinus tenderness on percussion	  Neck:no JVD, no lymphadenopathy, supple  Respiratory: CTA cristi  Cardiovascular: S1S2 RRR, no murmurs  Gastrointestinal:soft, (+) BS, no HSM  Extremities:no e/e/c  Vascular: DP Pulse:	right normal; left normal      LABS:                        10.0   10.41 )-----------( 551      ( 03 May 2019 07:29 )             31.9     05-03    143  |  104  |  8   ----------------------------<  112<H>  3.1<L>   |  26  |  0.83    Ca    8.6      03 May 2019 07:29            MICROBIOLOGY:    Culture - Body Fluid with Gram Stain (05.02.19 @ 09:10)    -  Ampicillin: S <=2 These ampicillin results predict results for amoxicillin    -  Ampicillin/Sulbactam: S <=4/2    Gram Stain:   Numerous Gram positive cocci in pairs  Numerous white blood cells    -  Trimethoprim/Sulfamethoxazole: S <=0.5/9.5    -  Vancomycin: S    -  Tobramycin: S 4    -  Gentamicin: S 2    -  Levofloxacin: S    -  Penicillin: S 0.064    -  Piperacillin/Tazobactam: S <=8    -  Ceftriaxone: S <=1 Enterobacter, Citrobacter, and Serratia may develop resistance during prolonged therapy    -  Ceftriaxone: S 0.38    -  Cefazolin: S <=2    -  Aztreonam: S <=4    -  Erythromycin: R    -  Clindamycin: R    Specimen Source: .Body Fluid abdominal fluid    Culture Results:   Few Proteus mirabilis  Moderate Enterococcus faecalis  Few Streptococcus anginosus  Susceptibility to follow.    Organism Identification: Proteus mirabilis  Streptococcus anginosus  Streptococcus anginosus    Organism: Proteus mirabilis    Organism: Streptococcus anginosus    Organism: Streptococcus anginosus    Method Type: FESTUS    Method Type: KB    Method Type: ETEST        RADIOLOGY & ADDITIONAL STUDIES:    < from: CT Abdomen and Pelvis w/ Oral Cont and w/ IV Cont (05.02.19 @ 05:44) >  Prior abdominal wall air-fluid collection now measures 8 x 1.3 cm,   previously 10 x 2.8 cm.

## 2019-05-05 LAB
GLUCOSE BLDC GLUCOMTR-MCNC: 111 MG/DL — HIGH (ref 70–99)
GLUCOSE BLDC GLUCOMTR-MCNC: 159 MG/DL — HIGH (ref 70–99)
GLUCOSE BLDC GLUCOMTR-MCNC: 170 MG/DL — HIGH (ref 70–99)
GLUCOSE BLDC GLUCOMTR-MCNC: 174 MG/DL — HIGH (ref 70–99)

## 2019-05-05 RX ORDER — DIPHENHYDRAMINE HCL 50 MG
50 CAPSULE ORAL EVERY 6 HOURS
Qty: 0 | Refills: 0 | Status: DISCONTINUED | OUTPATIENT
Start: 2019-05-05 | End: 2019-05-08

## 2019-05-05 RX ORDER — VANCOMYCIN HCL 1 G
1500 VIAL (EA) INTRAVENOUS EVERY 12 HOURS
Qty: 0 | Refills: 0 | Status: DISCONTINUED | OUTPATIENT
Start: 2019-05-05 | End: 2019-05-06

## 2019-05-05 RX ORDER — AZTREONAM 2 G
1000 VIAL (EA) INJECTION EVERY 8 HOURS
Qty: 0 | Refills: 0 | Status: DISCONTINUED | OUTPATIENT
Start: 2019-05-05 | End: 2019-05-06

## 2019-05-05 RX ADMIN — Medication 600 MILLIGRAM(S): at 07:03

## 2019-05-05 RX ADMIN — Medication 50 MILLIGRAM(S): at 14:12

## 2019-05-05 RX ADMIN — Medication 600 MILLIGRAM(S): at 07:54

## 2019-05-05 RX ADMIN — ENOXAPARIN SODIUM 40 MILLIGRAM(S): 100 INJECTION SUBCUTANEOUS at 11:48

## 2019-05-05 RX ADMIN — Medication 600 MILLIGRAM(S): at 12:40

## 2019-05-05 RX ADMIN — Medication 500 MILLIGRAM(S): at 07:03

## 2019-05-05 RX ADMIN — SIMETHICONE 80 MILLIGRAM(S): 80 TABLET, CHEWABLE ORAL at 17:32

## 2019-05-05 RX ADMIN — Medication 500 MILLIGRAM(S): at 14:12

## 2019-05-05 RX ADMIN — Medication 600 MILLIGRAM(S): at 11:48

## 2019-05-05 RX ADMIN — Medication 50 MILLIGRAM(S): at 22:58

## 2019-05-05 RX ADMIN — Medication 300 MILLIGRAM(S): at 11:48

## 2019-05-05 RX ADMIN — OXYCODONE HYDROCHLORIDE 10 MILLIGRAM(S): 5 TABLET ORAL at 22:30

## 2019-05-05 RX ADMIN — Medication 50 MILLIGRAM(S): at 07:05

## 2019-05-05 RX ADMIN — Medication 600 MILLIGRAM(S): at 17:32

## 2019-05-05 RX ADMIN — Medication 300 MILLIGRAM(S): at 00:19

## 2019-05-05 RX ADMIN — Medication 100 MILLIGRAM(S): at 23:07

## 2019-05-05 RX ADMIN — Medication 200 MILLIGRAM(S): at 17:32

## 2019-05-05 RX ADMIN — Medication 600 MILLIGRAM(S): at 17:56

## 2019-05-05 RX ADMIN — ONDANSETRON 4 MILLIGRAM(S): 8 TABLET, FILM COATED ORAL at 23:07

## 2019-05-05 RX ADMIN — Medication 500 MILLIGRAM(S): at 23:44

## 2019-05-05 RX ADMIN — Medication 200 MILLIGRAM(S): at 07:03

## 2019-05-05 RX ADMIN — Medication 50 MILLIGRAM(S): at 23:07

## 2019-05-05 RX ADMIN — Medication 2: at 11:47

## 2019-05-05 RX ADMIN — Medication 2: at 07:02

## 2019-05-05 RX ADMIN — OXYCODONE HYDROCHLORIDE 10 MILLIGRAM(S): 5 TABLET ORAL at 21:55

## 2019-05-05 RX ADMIN — Medication 2: at 17:46

## 2019-05-05 NOTE — PROGRESS NOTE ADULT - SUBJECTIVE AND OBJECTIVE BOX
evaluated patient for wound dressing change. At bedside, dressing appeared saturated with serosanguinous fluid, not purulent. no foul odor. Denies fever/chills, abdomen soft, NTND, no rebound or guarding, dressing removed and incision inspected, no surrounding erythema or purulent malodorous discharge noted, no fluctuance, irrigated wound with saline, gauze and abd pad applied  continue IV antibiotics    Patient reporting incisional pain has much improved, she is reporting mild headache which she attributes to lack of sleep, will give tylenol and reasses    Vital Signs Last 24 Hrs  T(C): 36.8 (04 May 2019 22:05), Max: 37 (04 May 2019 18:10)  T(F): 98.3 (04 May 2019 22:05), Max: 98.6 (04 May 2019 18:10)  HR: 90 (04 May 2019 22:05) (74 - 94)  BP: 121/78 (04 May 2019 22:05) (117/80 - 149/87)  BP(mean): --  RR: 17 (04 May 2019 22:05) (17 - 18)  SpO2: 100% (04 May 2019 22:05) (99% - 100%)

## 2019-05-05 NOTE — PROGRESS NOTE ADULT - SUBJECTIVE AND OBJECTIVE BOX
INTERVAL HPI/OVERNIGHT EVENTS:    Patient is a 39y old  Female who presents with a chief complaint of wound complication (05 May 2019 06:10)  No acute overnight events  pt had difficulty obtaining IV access  remains on abx.   diet reviewed - pt rec'd hamburger and fries, seen with 500cc bottle sprite and coke at bedside.   insulin administration and requirements reviewed  reports conitnued nausea, vomiting.   denies SOB, chest pain palpitations, urinary sx, headache, dizziness, subjective fever, chills    Pt reports the following symptoms:    CONSTITUTIONAL:  Negative fever or chills, feels well, good appetite  EYES:  Negative  blurry vision or double vision  CARDIOVASCULAR:  Negative for chest pain or palpitations  RESPIRATORY:  Negative for cough, wheezing, or SOB   GASTROINTESTINAL:  Negative for nausea, vomiting, diarrhea, constipation, or abdominal pain  GENITOURINARY:  Negative frequency, urgency or dysuria  NEUROLOGIC:  No headache, confusion, dizziness, lightheadedness    MEDICATIONS  (STANDING):  ALBUTerol/ipratropium for Nebulization 3 milliLiter(s) Nebulizer every 6 hours  aztreonam  IVPB 1000 milliGRAM(s) IV Intermittent every 8 hours  dextrose 5%. 1000 milliLiter(s) (50 mL/Hr) IV Continuous <Continuous>  dextrose 50% Injectable 12.5 Gram(s) IV Push once  dextrose 50% Injectable 25 Gram(s) IV Push once  dextrose 50% Injectable 25 Gram(s) IV Push once  docusate sodium 100 milliGRAM(s) Oral three times a day  enoxaparin Injectable 40 milliGRAM(s) SubCutaneous daily  ibuprofen  Tablet. 600 milliGRAM(s) Oral every 6 hours  insulin lispro (HumaLOG) corrective regimen sliding scale   SubCutaneous Before meals and at bedtime  iohexol 300 mG (iodine)/mL Oral Solution 30 milliLiter(s) Oral once  labetalol 200 milliGRAM(s) Oral every 12 hours  lactated ringers. 1000 milliLiter(s) (125 mL/Hr) IV Continuous <Continuous>  metroNIDAZOLE    Tablet 500 milliGRAM(s) Oral every 8 hours  simethicone 80 milliGRAM(s) Chew every 8 hours  vancomycin  IVPB 1500 milliGRAM(s) IV Intermittent every 12 hours    MEDICATIONS  (PRN):  ALBUTerol    90 MICROgram(s) HFA Inhaler 2 Puff(s) Inhalation every 6 hours PRN Shortness of Breath and/or Wheezing  dextrose 40% Gel 15 Gram(s) Oral once PRN Blood Glucose LESS THAN 70 milliGRAM(s)/deciliter  glucagon  Injectable 1 milliGRAM(s) IntraMuscular once PRN Glucose LESS THAN 70 milligrams/deciliter  metoclopramide Injectable 10 milliGRAM(s) IV Push every 6 hours PRN Nausea and/or Vomiting -2nd Line  ondansetron Injectable 4 milliGRAM(s) IV Push every 6 hours PRN Nausea and/or Vomiting 1st line  oxyCODONE    IR 5 milliGRAM(s) Oral every 4 hours PRN Mild Pain (1 - 3)  oxyCODONE    IR 10 milliGRAM(s) Oral every 6 hours PRN Severe Pain (7 - 10)  zinc oxide 20% Ointment 1 Application(s) Topical every 6 hours PRN skin blisters  zinc oxide 40% Ointment 1 Application(s) Topical every 4 hours PRN skin abrasion      Past medical history reviewed  Family history reviewed  Social history reviewed    PHYSICAL EXAM  Vital Signs Last 24 Hrs  T(C): 36.9 (05 May 2019 17:37), Max: 37.1 (05 May 2019 02:18)  T(F): 98.5 (05 May 2019 17:37), Max: 98.7 (05 May 2019 02:18)  HR: 74 (05 May 2019 17:37) (74 - 90)  BP: 155/88 (05 May 2019 17:37) (121/78 - 155/88)  BP(mean): --  RR: 14 (05 May 2019 17:37) (14 - 18)  SpO2: 99% (05 May 2019 17:37) (97% - 100%)    Constitutional: wn/wd in NAD.   HEENT: NCAT, MMM, OP clear, EOMI, no proptosis or lid retraction  Neck: no thyromegaly or palpable thyroid nodules   Respiratory: lungs CTAB.  Cardiovascular: regular rhythm, normal S1 and S2, no audible murmurs, no peripheral edema  GI: soft, NT/ND, no masses/HSM appreciated.  Neurology: no tremors, DTR 2+  Skin: no visible rashes/lesions  Psychiatric: AAO x 3, normal affect/mood.    LABS:                  HbA1C: 5.5 % (05-01 @ 12:52)  6.0 % (04-23 @ 11:31)    CAPILLARY BLOOD GLUCOSE      POCT Blood Glucose.: 159 mg/dL (05 May 2019 17:40)  POCT Blood Glucose.: 174 mg/dL (05 May 2019 11:42)  POCT Blood Glucose.: 170 mg/dL (05 May 2019 06:55)  POCT Blood Glucose.: 157 mg/dL (04 May 2019 22:13)

## 2019-05-05 NOTE — PROGRESS NOTE ADULT - SUBJECTIVE AND OBJECTIVE BOX
Patient evaluated at bedside. For AM rounds, she reports overnight and into today she believes her condition is improving. She has noted a decrease in swell on her mons and labia as well as her abdomen which she attribute to from the infection. She also has noted the drainage from her incision is less purulent, and there is no odor. She was able to sleep a little overnight as well     She has been ambulating without assistance, voiding spontaneously, passing gas, tolerating regular diet when she is not nauseated from the antibiotics.    She denies HA, dizziness, CP, palpitations, SOB, n/v, or heavy vaginal bleeding.    Physical Exam:  Vital Signs Last 24 Hrs  T(C): 37.1 (05 May 2019 02:18), Max: 37.1 (05 May 2019 02:18)  T(F): 98.7 (05 May 2019 02:18), Max: 98.7 (05 May 2019 02:18)  HR: 81 (05 May 2019 02:18) (74 - 94)  BP: 144/78 (05 May 2019 02:18) (117/80 - 149/87)  BP(mean): --  RR: 17 (05 May 2019 02:18) (17 - 18)  SpO2: 99% (05 May 2019 02:18) (99% - 100%)    Gen: NAD  Abd: + BS, soft, nontender, nondistended, no rebound or guarding  Incision clean, dry and intact  uterus firm at midline  : lochia WNL  Extremities: no swelling or calf tenderness                          10.0   10.41 )-----------( 551      ( 03 May 2019 07:29 )             31.9     05-03    143  |  104  |  8   ----------------------------<  112<H>  3.1<L>   |  26  |  0.83    Ca    8.6      03 May 2019 07:29 Patient evaluated at bedside. For AM rounds, she reports overnight and into today she believes her condition is improving. She has noted a decrease in swell on her mons and labia as well as her abdomen which she attribute to from the infection. She also has noted the drainage from her incision is less purulent, and there is no odor. She was able to sleep a little overnight as well     She has been ambulating without assistance, voiding spontaneously, passing gas, tolerating regular diet when she is not nauseated from the antibiotics.    She denies HA, dizziness, CP, palpitations, SOB, n/v, or heavy vaginal bleeding.    Physical Exam:  Vital Signs Last 24 Hrs  T(C): 37.1 (05 May 2019 02:18), Max: 37.1 (05 May 2019 02:18)  T(F): 98.7 (05 May 2019 02:18), Max: 98.7 (05 May 2019 02:18)  HR: 81 (05 May 2019 02:18) (74 - 94)  BP: 144/78 (05 May 2019 02:18) (117/80 - 149/87)  BP(mean): --  RR: 17 (05 May 2019 02:18) (17 - 18)  SpO2: 99% (05 May 2019 02:18) (99% - 100%)    Gen: NAD  Abd: + BS, soft, nontender, nondistended, no rebound or guarding,  incision with 3-4cm central defect, no active drainage, no surrounding erythema  uterus firm at midline  : lochia WNL  Extremities: no swelling or calf tenderness                          10.0   10.41 )-----------( 551      ( 03 May 2019 07:29 )             31.9     05-03    143  |  104  |  8   ----------------------------<  112<H>  3.1<L>   |  26  |  0.83    Ca    8.6      03 May 2019 07:29

## 2019-05-05 NOTE — PROVIDER CONTACT NOTE (OTHER) - ACTION/TREATMENT ORDERED:
Resident Gold made aware, IV removed. safety maintained Resident Haley made aware, IV removed. safety maintained

## 2019-05-06 LAB
GLUCOSE BLDC GLUCOMTR-MCNC: 105 MG/DL — HIGH (ref 70–99)
GLUCOSE BLDC GLUCOMTR-MCNC: 106 MG/DL — HIGH (ref 70–99)
GLUCOSE BLDC GLUCOMTR-MCNC: 115 MG/DL — HIGH (ref 70–99)
GLUCOSE BLDC GLUCOMTR-MCNC: 185 MG/DL — HIGH (ref 70–99)
GLUCOSE BLDC GLUCOMTR-MCNC: 189 MG/DL — HIGH (ref 70–99)

## 2019-05-06 PROCEDURE — 99232 SBSQ HOSP IP/OBS MODERATE 35: CPT | Mod: GC

## 2019-05-06 RX ORDER — LABETALOL HCL 100 MG
300 TABLET ORAL EVERY 6 HOURS
Qty: 0 | Refills: 0 | Status: DISCONTINUED | OUTPATIENT
Start: 2019-05-06 | End: 2019-05-07

## 2019-05-06 RX ORDER — MAGNESIUM SULFATE 500 MG/ML
4 VIAL (ML) INJECTION ONCE
Qty: 0 | Refills: 0 | Status: COMPLETED | OUTPATIENT
Start: 2019-05-06 | End: 2019-05-06

## 2019-05-06 RX ORDER — OXYCODONE AND ACETAMINOPHEN 5; 325 MG/1; MG/1
2 TABLET ORAL EVERY 6 HOURS
Qty: 0 | Refills: 0 | Status: DISCONTINUED | OUTPATIENT
Start: 2019-05-06 | End: 2019-05-08

## 2019-05-06 RX ORDER — SODIUM CHLORIDE 9 MG/ML
1000 INJECTION, SOLUTION INTRAVENOUS
Qty: 0 | Refills: 0 | Status: DISCONTINUED | OUTPATIENT
Start: 2019-05-06 | End: 2019-05-07

## 2019-05-06 RX ORDER — LINEZOLID 600 MG/300ML
600 INJECTION, SOLUTION INTRAVENOUS EVERY 12 HOURS
Qty: 0 | Refills: 0 | Status: DISCONTINUED | OUTPATIENT
Start: 2019-05-06 | End: 2019-05-08

## 2019-05-06 RX ORDER — LABETALOL HCL 100 MG
80 TABLET ORAL
Qty: 0 | Refills: 0 | Status: COMPLETED | OUTPATIENT
Start: 2019-05-06 | End: 2019-05-06

## 2019-05-06 RX ORDER — VANCOMYCIN HCL 1 G
1500 VIAL (EA) INTRAVENOUS EVERY 12 HOURS
Qty: 0 | Refills: 0 | Status: DISCONTINUED | OUTPATIENT
Start: 2019-05-06 | End: 2019-05-06

## 2019-05-06 RX ORDER — LABETALOL HCL 100 MG
200 TABLET ORAL ONCE
Qty: 0 | Refills: 0 | Status: COMPLETED | OUTPATIENT
Start: 2019-05-06 | End: 2019-05-06

## 2019-05-06 RX ORDER — LABETALOL HCL 100 MG
200 TABLET ORAL EVERY 8 HOURS
Qty: 0 | Refills: 0 | Status: DISCONTINUED | OUTPATIENT
Start: 2019-05-06 | End: 2019-05-06

## 2019-05-06 RX ORDER — MAGNESIUM SULFATE 500 MG/ML
1 VIAL (ML) INJECTION
Qty: 40 | Refills: 0 | Status: DISCONTINUED | OUTPATIENT
Start: 2019-05-06 | End: 2019-05-07

## 2019-05-06 RX ORDER — LABETALOL HCL 100 MG
20 TABLET ORAL ONCE
Qty: 0 | Refills: 0 | Status: COMPLETED | OUTPATIENT
Start: 2019-05-06 | End: 2019-05-06

## 2019-05-06 RX ORDER — LABETALOL HCL 100 MG
40 TABLET ORAL ONCE
Qty: 0 | Refills: 0 | Status: COMPLETED | OUTPATIENT
Start: 2019-05-06 | End: 2019-05-06

## 2019-05-06 RX ADMIN — Medication 20 MILLIGRAM(S): at 15:20

## 2019-05-06 RX ADMIN — Medication 2: at 22:12

## 2019-05-06 RX ADMIN — SIMETHICONE 80 MILLIGRAM(S): 80 TABLET, CHEWABLE ORAL at 02:08

## 2019-05-06 RX ADMIN — Medication 40 MILLIGRAM(S): at 15:50

## 2019-05-06 RX ADMIN — Medication 200 MILLIGRAM(S): at 06:48

## 2019-05-06 RX ADMIN — Medication 100 GRAM(S): at 16:47

## 2019-05-06 RX ADMIN — ENOXAPARIN SODIUM 40 MILLIGRAM(S): 100 INJECTION SUBCUTANEOUS at 12:52

## 2019-05-06 RX ADMIN — SIMETHICONE 80 MILLIGRAM(S): 80 TABLET, CHEWABLE ORAL at 18:06

## 2019-05-06 RX ADMIN — OXYCODONE AND ACETAMINOPHEN 2 TABLET(S): 5; 325 TABLET ORAL at 19:01

## 2019-05-06 RX ADMIN — Medication 80 MILLIGRAM(S): at 18:30

## 2019-05-06 RX ADMIN — Medication 300 MILLIGRAM(S): at 02:07

## 2019-05-06 RX ADMIN — Medication 600 MILLIGRAM(S): at 17:00

## 2019-05-06 RX ADMIN — SIMETHICONE 80 MILLIGRAM(S): 80 TABLET, CHEWABLE ORAL at 11:48

## 2019-05-06 RX ADMIN — OXYCODONE AND ACETAMINOPHEN 2 TABLET(S): 5; 325 TABLET ORAL at 12:00

## 2019-05-06 RX ADMIN — Medication 200 MILLIGRAM(S): at 19:01

## 2019-05-06 RX ADMIN — Medication 50 GM/HR: at 17:14

## 2019-05-06 RX ADMIN — Medication 600 MILLIGRAM(S): at 23:18

## 2019-05-06 RX ADMIN — OXYCODONE AND ACETAMINOPHEN 2 TABLET(S): 5; 325 TABLET ORAL at 11:47

## 2019-05-06 RX ADMIN — Medication 600 MILLIGRAM(S): at 18:01

## 2019-05-06 RX ADMIN — OXYCODONE AND ACETAMINOPHEN 2 TABLET(S): 5; 325 TABLET ORAL at 18:20

## 2019-05-06 RX ADMIN — Medication 500 MILLIGRAM(S): at 06:47

## 2019-05-06 RX ADMIN — Medication 600 MILLIGRAM(S): at 06:49

## 2019-05-06 RX ADMIN — Medication 50 MILLIGRAM(S): at 06:48

## 2019-05-06 RX ADMIN — Medication 300 MILLIGRAM(S): at 21:51

## 2019-05-06 RX ADMIN — ONDANSETRON 4 MILLIGRAM(S): 8 TABLET, FILM COATED ORAL at 11:30

## 2019-05-06 RX ADMIN — Medication 80 MILLIGRAM(S): at 18:40

## 2019-05-06 RX ADMIN — ONDANSETRON 4 MILLIGRAM(S): 8 TABLET, FILM COATED ORAL at 18:14

## 2019-05-06 RX ADMIN — Medication 0.5 MILLIGRAM(S): at 21:27

## 2019-05-06 RX ADMIN — Medication 2: at 12:07

## 2019-05-06 RX ADMIN — Medication 2 TABLET(S): at 18:06

## 2019-05-06 RX ADMIN — Medication 600 MILLIGRAM(S): at 07:30

## 2019-05-06 RX ADMIN — SODIUM CHLORIDE 75 MILLILITER(S): 9 INJECTION, SOLUTION INTRAVENOUS at 16:49

## 2019-05-06 RX ADMIN — LINEZOLID 600 MILLIGRAM(S): 600 INJECTION, SOLUTION INTRAVENOUS at 18:07

## 2019-05-06 NOTE — PROGRESS NOTE ADULT - ASSESSMENT
IMPRESSION: Abdominal fluid collection/abscess.  Clinically improving.  Sensitivities have resulted.  Strep is sensitive to Vanco and Proteus is sensitive to Aztreonam.  Enterococcus is sensitive to ampicillin.    Recommend:  1.  Linezolid PO 60mg q12 for 7 days, patient will need prior authorization for Linezolid  2.  Bactrim PO 2 double strength tablets q12 for 7 days  2.  Please discontinue breastfeeding while on Bactrim    ID to sign off, please reconsult as needed IMPRESSION: Abdominal fluid collection/abscess.  Clinically improving.  Sensitivities have resulted.  Strep is sensitive to Vanco and Proteus is sensitive to Aztreonam.  Enterococcus is sensitive to ampicillin.    Recommend:  1.  Linezolid PO 600mg q12 for 7 days, patient will need prior authorization for Linezolid  2.  Bactrim PO 2 double strength tablets q12 for 7 days  2.  Please discontinue breastfeeding while on Bactrim    ID to sign off, please reconsult as needed IMPRESSION: Abdominal fluid collection/abscess.  Clinically improving.  Sensitivities have resulted.  Strep is sensitive to Vanco and Proteus is sensitive to Aztreonam.  Enterococcus is sensitive to ampicillin.    Recommend:  1.  Linezolid PO 600mg q12 for 7 days, please check to see if patient will need prior authorization for Linezolid  2.  Bactrim PO 1 double strength tablets q12 for 7 days  2.  Please discontinue breastfeeding while on Bactrim    ID to sign off, please reconsult as needed

## 2019-05-06 NOTE — PROGRESS NOTE ADULT - SUBJECTIVE AND OBJECTIVE BOX
Provider called to bedside for elevated systolic blood pressure of 160. Upon evaluation, patient reports that the room was spinning and that she has floaters in her left eye. Patient reports the onset of a headache this morning which was not resolved with 2 Percocet taken at 11:15 am. Patient denies abdominal pain, nausea, vomiting. Patellar reflexes 1+. Lungs CTAB. Cardiovascular RRR. Plan to recheck blood pressure in 15 minutes. If elevated, patient will likely need IV Magnesium. Dr. Duque aware.

## 2019-05-06 NOTE — CHART NOTE - NSCHARTNOTEFT_GEN_A_CORE
Patient continues to have severe range blood pressures, last one 164/100. Reports resolution of left sided scotoma but reports left sided headache, located temporally and frontally. No RUQ pain. Only reports mild flushing from magnesium. IV labetalol 80mg was pushed over 2 minutes at 8:08. Repeat BP in 10 minutes.

## 2019-05-06 NOTE — CHART NOTE - NSCHARTNOTEFT_GEN_A_CORE
patient pushed on due to severe blood pressure. pushed 40mg labetalol at 1552. Repeat BP in 10 min. Patient reports mild headache but left floaters are improving. Plan to start magnesium now for siPEC with severe features.

## 2019-05-06 NOTE — PROGRESS NOTE ADULT - SUBJECTIVE AND OBJECTIVE BOX
Patient evaluated at bedside. For AM rounds, she reports feeling well overnight  She was able to sleep a little overnight as well     She has been ambulating without assistance, voiding spontaneously, passing gas, tolerating regular diet when she is not nauseated from the antibiotics. She denies HA, dizziness, CP, palpitations, SOB, n/v, or heavy vaginal bleeding.      Physical Exam:  Vital Signs Last 24 Hrs  T(C): 36.9 (06 May 2019 06:59), Max: 37.1 (05 May 2019 22:00)  T(F): 98.4 (06 May 2019 06:59), Max: 98.7 (05 May 2019 22:00)  HR: 78 (06 May 2019 06:59) (74 - 89)  BP: 129/78 (06 May 2019 06:59) (128/77 - 156/97)  BP(mean): --  RR: 19 (06 May 2019 06:59) (14 - 19)  SpO2: 97% (06 May 2019 06:59) (97% - 100%)    Gen: NAD  Abd: + BS, soft, nontender, nondistended, no rebound or guarding,  incision with 3-4cm central defect, no active drainage, no surrounding erythema  uterus firm at midline  : lochia WNL  Extremities: no swelling or calf tenderness      MEDICATIONS  (STANDING):  ALBUTerol/ipratropium for Nebulization 3 milliLiter(s) Nebulizer every 6 hours  aztreonam  IVPB 1000 milliGRAM(s) IV Intermittent every 8 hours  dextrose 5%. 1000 milliLiter(s) (50 mL/Hr) IV Continuous <Continuous>  dextrose 50% Injectable 12.5 Gram(s) IV Push once  dextrose 50% Injectable 25 Gram(s) IV Push once  dextrose 50% Injectable 25 Gram(s) IV Push once  docusate sodium 100 milliGRAM(s) Oral three times a day  enoxaparin Injectable 40 milliGRAM(s) SubCutaneous daily  ibuprofen  Tablet. 600 milliGRAM(s) Oral every 6 hours  insulin lispro (HumaLOG) corrective regimen sliding scale   SubCutaneous Before meals and at bedtime  iohexol 300 mG (iodine)/mL Oral Solution 30 milliLiter(s) Oral once  labetalol 200 milliGRAM(s) Oral every 12 hours  lactated ringers. 1000 milliLiter(s) (125 mL/Hr) IV Continuous <Continuous>  metroNIDAZOLE    Tablet 500 milliGRAM(s) Oral every 8 hours  simethicone 80 milliGRAM(s) Chew every 8 hours  vancomycin  IVPB 1500 milliGRAM(s) IV Intermittent every 12 hours    MEDICATIONS  (PRN):  ALBUTerol    90 MICROgram(s) HFA Inhaler 2 Puff(s) Inhalation every 6 hours PRN Shortness of Breath and/or Wheezing  dextrose 40% Gel 15 Gram(s) Oral once PRN Blood Glucose LESS THAN 70 milliGRAM(s)/deciliter  diphenhydrAMINE 50 milliGRAM(s) Oral every 6 hours PRN Rash and/or Itching  glucagon  Injectable 1 milliGRAM(s) IntraMuscular once PRN Glucose LESS THAN 70 milligrams/deciliter  metoclopramide Injectable 10 milliGRAM(s) IV Push every 6 hours PRN Nausea and/or Vomiting -2nd Line  ondansetron Injectable 4 milliGRAM(s) IV Push every 6 hours PRN Nausea and/or Vomiting 1st line  zinc oxide 20% Ointment 1 Application(s) Topical every 6 hours PRN skin blisters  zinc oxide 40% Ointment 1 Application(s) Topical every 4 hours PRN skin abrasion

## 2019-05-06 NOTE — PROGRESS NOTE ADULT - SUBJECTIVE AND OBJECTIVE BOX
INTERVAL HPI/OVERNIGHT EVENTS: Patient seen and examined bedside. Found to resting comfortably in bed in NAD. Patient reports reaction to vancomycin yesterday. Overall patient feeling better, endorses improvement with incision.    CONSTITUTIONAL:  Negative fever or chills, feels well, good appetite  EYES:  Negative  blurry vision or double vision  CARDIOVASCULAR:  Negative for chest pain or palpitations  RESPIRATORY:  Negative for cough, wheezing, or SOB   GASTROINTESTINAL:  Negative for nausea, vomiting, diarrhea, constipation, or abdominal pain  GENITOURINARY:  Negative frequency, urgency or dysuria  NEUROLOGIC:  No headache, confusion, dizziness, lightheadedness      ANTIBIOTICS/RELEVANT:    MEDICATIONS  (STANDING):  ALBUTerol/ipratropium for Nebulization 3 milliLiter(s) Nebulizer every 6 hours  aztreonam  IVPB 1000 milliGRAM(s) IV Intermittent every 8 hours  dextrose 5%. 1000 milliLiter(s) (50 mL/Hr) IV Continuous <Continuous>  dextrose 50% Injectable 12.5 Gram(s) IV Push once  dextrose 50% Injectable 25 Gram(s) IV Push once  dextrose 50% Injectable 25 Gram(s) IV Push once  docusate sodium 100 milliGRAM(s) Oral three times a day  enoxaparin Injectable 40 milliGRAM(s) SubCutaneous daily  ibuprofen  Tablet. 600 milliGRAM(s) Oral every 6 hours  insulin lispro (HumaLOG) corrective regimen sliding scale   SubCutaneous Before meals and at bedtime  iohexol 300 mG (iodine)/mL Oral Solution 30 milliLiter(s) Oral once  labetalol 200 milliGRAM(s) Oral every 12 hours  lactated ringers. 1000 milliLiter(s) (125 mL/Hr) IV Continuous <Continuous>  metroNIDAZOLE    Tablet 500 milliGRAM(s) Oral every 8 hours  oxyCODONE    5 mG/acetaminophen 325 mG 2 Tablet(s) Oral every 6 hours  simethicone 80 milliGRAM(s) Chew every 8 hours  vancomycin  IVPB 1500 milliGRAM(s) IV Intermittent every 12 hours    MEDICATIONS  (PRN):  ALBUTerol    90 MICROgram(s) HFA Inhaler 2 Puff(s) Inhalation every 6 hours PRN Shortness of Breath and/or Wheezing  dextrose 40% Gel 15 Gram(s) Oral once PRN Blood Glucose LESS THAN 70 milliGRAM(s)/deciliter  diphenhydrAMINE 50 milliGRAM(s) Oral every 6 hours PRN Rash and/or Itching  glucagon  Injectable 1 milliGRAM(s) IntraMuscular once PRN Glucose LESS THAN 70 milligrams/deciliter  metoclopramide Injectable 10 milliGRAM(s) IV Push every 6 hours PRN Nausea and/or Vomiting -2nd Line  ondansetron Injectable 4 milliGRAM(s) IV Push every 6 hours PRN Nausea and/or Vomiting 1st line  zinc oxide 20% Ointment 1 Application(s) Topical every 6 hours PRN skin blisters  zinc oxide 40% Ointment 1 Application(s) Topical every 4 hours PRN skin abrasion        Vital Signs Last 24 Hrs  T(C): 36.7 (06 May 2019 10:00), Max: 37.1 (05 May 2019 22:00)  T(F): 98 (06 May 2019 10:00), Max: 98.7 (05 May 2019 22:00)  HR: 80 (06 May 2019 10:00) (74 - 84)  BP: 138/82 (06 May 2019 10:00) (128/77 - 156/97)  BP(mean): --  RR: 16 (06 May 2019 10:00) (14 - 19)  SpO2: 99% (06 May 2019 10:00) (97% - 100%)    PHYSICAL EXAM:  Constitutional:Well-developed, well nourished  Eyes:NAOMI, EOMI  Ear/Nose/Throat: no oral lesion, no sinus tenderness on percussion	  Neck:no JVD, no lymphadenopathy, supple  Respiratory: CTA cristi  Cardiovascular: S1S2 RRR, no murmurs  Gastrointestinal:soft, (+) BS, no HSM, Pfannenstiel incision with mild surrounding erythema, improving  Extremities:no e/e/c  Vascular: DP Pulse:	right normal; left normal      LABS:                MICROBIOLOGY:    RADIOLOGY & ADDITIONAL STUDIES:

## 2019-05-06 NOTE — PROVIDER CONTACT NOTE (OTHER) - SITUATION
Pt. c/o of feeling unwell and having a headache and blurry vision in left eye. /90. FS Pt. c/o of feeling unwell and having a headache and blurry vision in left eye. /90.

## 2019-05-06 NOTE — PROGRESS NOTE ADULT - SUBJECTIVE AND OBJECTIVE BOX
evaluated patient for wound dressing change. At bedside, dressing completely saturated with serosanguinous fluid, not purulent. no foul odor. Denies fever/chills, abdomen soft, NTND, no rebound or guarding, dressing removed and incision inspected, no surrounding erythema or purulent malodorous discharge noted, no fluctuance, irrigated wound with saline, gauze and abd pad applied  continue IV antibiotics, patient now with IV access in right hand and that is distressing to her, discussed with patient at length about need for continuing IV abx and how her condition has dramatically improved and patient agrees. Overnight patient was concerned that she was having an allergic reaction to her vancomycin, showed a video of her allergic reaction which she calls hives and swelling, upon reviewing the video it appears that the swelling is due to infiltration of her IV line on her left arm, at bedside swelling has completely resolved around that site. Patient is now okay to c/w vancomycin with PO benadryl just prior.

## 2019-05-06 NOTE — PROGRESS NOTE ADULT - ASSESSMENT
40yo with hx of severe preeclampsia, HTN, T2DM s/p C/S#3 on 4/20 presenting for severe abdominal pain, concern for intraabdominal bleeding, now POD8 s/p exploratory laparotomy and evacuation of hemoperitoneum, stable. Incision now improving after purulent drainage on POD 4. s/p ABBIE drain on 5/2.   Pt remains afebrile and WBC normal. ID consulted, recommend vancomycin 15mg/kg q8 and metronidazole PO 500mg q8. Gen surg signed off, no intervention at this time. Wound culture growing enterococcus, proteus and strep aginosus. Bedside wet-to-dry dressings, change 1-2 times a day  1. Pain: Motrin ATC, , oxycodone prn, zinc oxide prn skin blisters  2. CV: labetalol 200 BID for HTN. asymptomatic s/p Iv magnesium on initial admission   3. Pulm: albuterol prn for wheezing.  3. GI: diabetic DASH, simethicone, colace   3. : voiding  4. DVT prophylaxis: lovenox,  SCDs, ambulation  5. Endo: continue moderate ISS per endocrine, to be d/c on 500mg BID metoformin  6. Heme: Hgb stable s/p 2u PRBC. AM labs pending   8. ID: Aztreonam 1g q8, vancomycin 1500mg IV q12, PO metronidazole 500mg q8.  s/p clindamycin (5/1-2), f/u ID recs regharding d/c PO abx, f/u drain culture  7. Dispo: pending stable clinical status

## 2019-05-07 LAB
ALBUMIN SERPL ELPH-MCNC: 2.9 G/DL — LOW (ref 3.3–5)
ALBUMIN SERPL ELPH-MCNC: 3.2 G/DL — LOW (ref 3.3–5)
ALP SERPL-CCNC: 108 U/L — SIGNIFICANT CHANGE UP (ref 40–120)
ALP SERPL-CCNC: 115 U/L — SIGNIFICANT CHANGE UP (ref 40–120)
ALT FLD-CCNC: 16 U/L — SIGNIFICANT CHANGE UP (ref 10–45)
ALT FLD-CCNC: 17 U/L — SIGNIFICANT CHANGE UP (ref 10–45)
ANION GAP SERPL CALC-SCNC: 12 MMOL/L — SIGNIFICANT CHANGE UP (ref 5–17)
ANION GAP SERPL CALC-SCNC: 12 MMOL/L — SIGNIFICANT CHANGE UP (ref 5–17)
AST SERPL-CCNC: 24 U/L — SIGNIFICANT CHANGE UP (ref 10–40)
AST SERPL-CCNC: 25 U/L — SIGNIFICANT CHANGE UP (ref 10–40)
BASOPHILS # BLD AUTO: 0.03 K/UL — SIGNIFICANT CHANGE UP (ref 0–0.2)
BASOPHILS NFR BLD AUTO: 0.2 % — SIGNIFICANT CHANGE UP (ref 0–2)
BILIRUB SERPL-MCNC: 0.2 MG/DL — SIGNIFICANT CHANGE UP (ref 0.2–1.2)
BILIRUB SERPL-MCNC: <0.2 MG/DL — SIGNIFICANT CHANGE UP (ref 0.2–1.2)
BUN SERPL-MCNC: 5 MG/DL — LOW (ref 7–23)
BUN SERPL-MCNC: 7 MG/DL — SIGNIFICANT CHANGE UP (ref 7–23)
CALCIUM SERPL-MCNC: 7.4 MG/DL — LOW (ref 8.4–10.5)
CALCIUM SERPL-MCNC: 8.2 MG/DL — LOW (ref 8.4–10.5)
CHLORIDE SERPL-SCNC: 97 MMOL/L — SIGNIFICANT CHANGE UP (ref 96–108)
CHLORIDE SERPL-SCNC: 99 MMOL/L — SIGNIFICANT CHANGE UP (ref 96–108)
CO2 SERPL-SCNC: 24 MMOL/L — SIGNIFICANT CHANGE UP (ref 22–31)
CO2 SERPL-SCNC: 26 MMOL/L — SIGNIFICANT CHANGE UP (ref 22–31)
CREAT SERPL-MCNC: 0.79 MG/DL — SIGNIFICANT CHANGE UP (ref 0.5–1.3)
CREAT SERPL-MCNC: 0.86 MG/DL — SIGNIFICANT CHANGE UP (ref 0.5–1.3)
EOSINOPHIL # BLD AUTO: 0.28 K/UL — SIGNIFICANT CHANGE UP (ref 0–0.5)
EOSINOPHIL NFR BLD AUTO: 2.3 % — SIGNIFICANT CHANGE UP (ref 0–6)
GLUCOSE BLDC GLUCOMTR-MCNC: 121 MG/DL — HIGH (ref 70–99)
GLUCOSE BLDC GLUCOMTR-MCNC: 121 MG/DL — HIGH (ref 70–99)
GLUCOSE BLDC GLUCOMTR-MCNC: 132 MG/DL — HIGH (ref 70–99)
GLUCOSE BLDC GLUCOMTR-MCNC: 148 MG/DL — HIGH (ref 70–99)
GLUCOSE BLDC GLUCOMTR-MCNC: 163 MG/DL — HIGH (ref 70–99)
GLUCOSE SERPL-MCNC: 125 MG/DL — HIGH (ref 70–99)
GLUCOSE SERPL-MCNC: 149 MG/DL — HIGH (ref 70–99)
HCT VFR BLD CALC: 35.1 % — SIGNIFICANT CHANGE UP (ref 34.5–45)
HGB BLD-MCNC: 10.7 G/DL — LOW (ref 11.5–15.5)
IMM GRANULOCYTES NFR BLD AUTO: 1.4 % — SIGNIFICANT CHANGE UP (ref 0–1.5)
LDH SERPL L TO P-CCNC: 293 U/L — HIGH (ref 50–242)
LYMPHOCYTES # BLD AUTO: 18.9 % — SIGNIFICANT CHANGE UP (ref 13–44)
LYMPHOCYTES # BLD AUTO: 2.28 K/UL — SIGNIFICANT CHANGE UP (ref 1–3.3)
MAGNESIUM SERPL-MCNC: 6.2 MG/DL — HIGH (ref 1.6–2.6)
MAGNESIUM SERPL-MCNC: 6.8 MG/DL — HIGH (ref 1.6–2.6)
MAGNESIUM SERPL-MCNC: 7.4 MG/DL — CRITICAL HIGH (ref 1.6–2.6)
MCHC RBC-ENTMCNC: 26.2 PG — LOW (ref 27–34)
MCHC RBC-ENTMCNC: 30.5 GM/DL — LOW (ref 32–36)
MCV RBC AUTO: 85.8 FL — SIGNIFICANT CHANGE UP (ref 80–100)
MONOCYTES # BLD AUTO: 0.97 K/UL — HIGH (ref 0–0.9)
MONOCYTES NFR BLD AUTO: 8 % — SIGNIFICANT CHANGE UP (ref 2–14)
NEUTROPHILS # BLD AUTO: 8.35 K/UL — HIGH (ref 1.8–7.4)
NEUTROPHILS NFR BLD AUTO: 69.2 % — SIGNIFICANT CHANGE UP (ref 43–77)
NRBC # BLD: 0 /100 WBCS — SIGNIFICANT CHANGE UP (ref 0–0)
PLATELET # BLD AUTO: 691 K/UL — HIGH (ref 150–400)
POTASSIUM SERPL-MCNC: 3.4 MMOL/L — LOW (ref 3.5–5.3)
POTASSIUM SERPL-MCNC: 3.5 MMOL/L — SIGNIFICANT CHANGE UP (ref 3.5–5.3)
POTASSIUM SERPL-SCNC: 3.4 MMOL/L — LOW (ref 3.5–5.3)
POTASSIUM SERPL-SCNC: 3.5 MMOL/L — SIGNIFICANT CHANGE UP (ref 3.5–5.3)
PROT SERPL-MCNC: 6.8 G/DL — SIGNIFICANT CHANGE UP (ref 6–8.3)
PROT SERPL-MCNC: 7.3 G/DL — SIGNIFICANT CHANGE UP (ref 6–8.3)
RBC # BLD: 4.09 M/UL — SIGNIFICANT CHANGE UP (ref 3.8–5.2)
RBC # FLD: 16.2 % — HIGH (ref 10.3–14.5)
SODIUM SERPL-SCNC: 135 MMOL/L — SIGNIFICANT CHANGE UP (ref 135–145)
SODIUM SERPL-SCNC: 135 MMOL/L — SIGNIFICANT CHANGE UP (ref 135–145)
URATE SERPL-MCNC: 5.8 MG/DL — SIGNIFICANT CHANGE UP (ref 2.5–7)
URATE SERPL-MCNC: 5.9 MG/DL — SIGNIFICANT CHANGE UP (ref 2.5–7)
WBC # BLD: 12.08 K/UL — HIGH (ref 3.8–10.5)
WBC # FLD AUTO: 12.08 K/UL — HIGH (ref 3.8–10.5)

## 2019-05-07 PROCEDURE — 99232 SBSQ HOSP IP/OBS MODERATE 35: CPT | Mod: GC

## 2019-05-07 RX ORDER — LABETALOL HCL 100 MG
300 TABLET ORAL EVERY 8 HOURS
Qty: 0 | Refills: 0 | Status: DISCONTINUED | OUTPATIENT
Start: 2019-05-07 | End: 2019-05-08

## 2019-05-07 RX ADMIN — LINEZOLID 600 MILLIGRAM(S): 600 INJECTION, SOLUTION INTRAVENOUS at 07:37

## 2019-05-07 RX ADMIN — Medication 600 MILLIGRAM(S): at 00:57

## 2019-05-07 RX ADMIN — Medication 1 TABLET(S): at 18:00

## 2019-05-07 RX ADMIN — Medication 2 TABLET(S): at 07:36

## 2019-05-07 RX ADMIN — OXYCODONE AND ACETAMINOPHEN 2 TABLET(S): 5; 325 TABLET ORAL at 19:14

## 2019-05-07 RX ADMIN — Medication 300 MILLIGRAM(S): at 21:46

## 2019-05-07 RX ADMIN — Medication 300 MILLIGRAM(S): at 12:23

## 2019-05-07 RX ADMIN — Medication 300 MILLIGRAM(S): at 05:19

## 2019-05-07 RX ADMIN — ENOXAPARIN SODIUM 40 MILLIGRAM(S): 100 INJECTION SUBCUTANEOUS at 12:22

## 2019-05-07 RX ADMIN — LINEZOLID 600 MILLIGRAM(S): 600 INJECTION, SOLUTION INTRAVENOUS at 18:00

## 2019-05-07 RX ADMIN — SIMETHICONE 80 MILLIGRAM(S): 80 TABLET, CHEWABLE ORAL at 02:34

## 2019-05-07 RX ADMIN — OXYCODONE AND ACETAMINOPHEN 2 TABLET(S): 5; 325 TABLET ORAL at 01:45

## 2019-05-07 RX ADMIN — Medication 100 MILLIGRAM(S): at 23:43

## 2019-05-07 RX ADMIN — Medication 600 MILLIGRAM(S): at 23:41

## 2019-05-07 RX ADMIN — Medication 10 MILLIGRAM(S): at 21:20

## 2019-05-07 RX ADMIN — SIMETHICONE 80 MILLIGRAM(S): 80 TABLET, CHEWABLE ORAL at 17:59

## 2019-05-07 RX ADMIN — OXYCODONE AND ACETAMINOPHEN 1 TABLET(S): 5; 325 TABLET ORAL at 00:18

## 2019-05-07 NOTE — PROVIDER CONTACT NOTE (CHANGE IN STATUS NOTIFICATION) - ASSESSMENT
Vital signs: T 97.6, Bp 134/90, R 17, P 72, O2 sat 97%. Reflexes are present. Breath sounds are normal.

## 2019-05-07 NOTE — PROGRESS NOTE ADULT - ATTENDING COMMENTS
A/P 39yFemale with hx of DM presenting for management of wound infection with moderate hyperglycemia.     1.  DM: type 2, controlled, uncomplicated  exacerbated by non compliance with diabetic diet  will monitor glucose levels and consider initiation of insulin while pt in hospital, but can likley be managed with metformin as outpatient  Continue lispro moderate dose scale with meals and at bedtime.   Continue consistent carb diet  FSG Goal 100-180    2.  Hyperlipidemia - goal LDL < 70  check lipid profile, would start atorvastatin 40mg daily if above goal    Pt is advised to follow up with me at discharge by calling .
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     1.  DM: type 2, controlled, uncomplicated.   Continue lispro moderate dose scale with meals and at bedtime.   Continue consistent carb diet  FSG Goal 100-180  start metformin 500mg once daily at discharge    2.  Hyperlipidemia - goal LDL < 70  check lipid profile    Pt is advised to follow up with me at discharge by calling .
Pt seen on rounds this afternoon.  Glucoses have been < 150 for the most part off insulin and oral agents, though would possibly be even lower if the pt were not treating suspected hypoglycemia (blurred vision, etc) with OJ.  Her fingersticks at those times have apparently not been < 100.  Her BP was elevated yesterday, and labetalol was increased to 300 mg TID.  She was also started on IV magnesium infusion, which just finished this afternoon.  Need to observe her BP on the increased labetalol but off Mg.  If she rises once again, would add 12.5 mg of HCTZ as the next step.  (Although she of course needs to be on an ACE or ARB for BP and nephro-protection, she still plans to try to breast-feed.  Will therefore need to defer use of these agents)
continue present management  continue iv antibiotics
I reviewed the notes above and I agree with plan of care  continue IV antibiotics, enterococcus growing in wound culture  pain management prn  wound getting better , wet and dry, wound opened 3-4 cm in middle and minimal sx/sx of erythema, and good granulation tissue  ambulate prn  diabetic diet
Patient seen with Dr. Francois, Dr Samaniego and CHRISTINE Dvuall. at the bedside. She has not been eating very much and glucosesare 117-104-128, therefore she is not being given standing Insulin just low dose Insulin coverage if needed.
Pt seen on rounds this afternoon.  Glucoses remain generally in the 130-150 range on sliding scale coverage only, but pt has eaten almost nothing today.  Nonetheless, the stablility overnight indicates that she does not appear to need basal insulin.  To continue sliding scale coverage for now, decide about choice of oral agent tomorrow.
continue present management  no fever, stable vitals  continue iv antibiotics
Agree with above.  Continue current antibiotics.  Will narrow based on cultures
Will continue to follow patient at this time until drainage slows.   Active serosangious drainage noted from incision.  May consider vacuum for better wound management.   Will aim for better pain management and order nebulizer audible wheezing noted.
Agree with above.  Can stop IV antibiotics and switch to Linezolid 600 mg PO q12 + Bactrim 1 DS PO q12 x 7 more days.  She should not breastfeed until 24 hours after stopping antibiotics

## 2019-05-07 NOTE — PROGRESS NOTE ADULT - SUBJECTIVE AND OBJECTIVE BOX
Patient evaluated at bedside for clinical magnesium check. Reporting mild ha but says thats its much improved from before     She denies visual disturbances including scotoma, headache and right upper quadrant pain. Also denies nausea/vomiting/epigastric pain/shortness of breath. Pain well controlled.      T(C): 36.4 (05-06-19 @ 23:17), Max: 36.8 (05-06-19 @ 14:00)  HR: 79 (05-06-19 @ 23:46) (64 - 80)  BP: 124/69 (05-06-19 @ 23:46) (124/69 - 181/101)  RR: 18 (05-06-19 @ 23:17) (16 - 18)  SpO2: 98% (05-06-19 @ 23:17) (98% - 99%)  Wt(kg): --    Gen: NAD  Pulm: CTAB  Abd: soft, nontender, no rebound or guarding, no epigastric tenderness, liver nonpalpable +BS, fundus palpated   : voiding  Ext: Reflexes +        Mg     6.2     05-06 05-06-19 @ 07:01  -  05-07-19 @ 01:26  --------------------------------------------------------  IN: 220 mL / OUT: 1150 mL / NET: -930 mL      MEDICATIONS  (STANDING):  ALBUTerol/ipratropium for Nebulization 3 milliLiter(s) Nebulizer every 6 hours  dextrose 5%. 1000 milliLiter(s) (50 mL/Hr) IV Continuous <Continuous>  dextrose 50% Injectable 12.5 Gram(s) IV Push once  dextrose 50% Injectable 25 Gram(s) IV Push once  dextrose 50% Injectable 25 Gram(s) IV Push once  docusate sodium 100 milliGRAM(s) Oral three times a day  enoxaparin Injectable 40 milliGRAM(s) SubCutaneous daily  ibuprofen  Tablet. 600 milliGRAM(s) Oral every 6 hours  insulin lispro (HumaLOG) corrective regimen sliding scale   SubCutaneous Before meals and at bedtime  iohexol 300 mG (iodine)/mL Oral Solution 30 milliLiter(s) Oral once  labetalol 300 milliGRAM(s) Oral every 6 hours  lactated ringers. 1000 milliLiter(s) (75 mL/Hr) IV Continuous <Continuous>  lactated ringers. 1000 milliLiter(s) (125 mL/Hr) IV Continuous <Continuous>  linezolid    Tablet 600 milliGRAM(s) Oral every 12 hours  magnesium sulfate Infusion 2 Gm/Hr (50 mL/Hr) IV Continuous <Continuous>  oxyCODONE    5 mG/acetaminophen 325 mG 2 Tablet(s) Oral every 6 hours  simethicone 80 milliGRAM(s) Chew every 8 hours  trimethoprim  160 mG/sulfamethoxazole 800 mG 2 Tablet(s) Oral two times a day    MEDICATIONS  (PRN):  ALBUTerol    90 MICROgram(s) HFA Inhaler 2 Puff(s) Inhalation every 6 hours PRN Shortness of Breath and/or Wheezing  dextrose 40% Gel 15 Gram(s) Oral once PRN Blood Glucose LESS THAN 70 milliGRAM(s)/deciliter  diphenhydrAMINE 50 milliGRAM(s) Oral every 6 hours PRN Rash and/or Itching  glucagon  Injectable 1 milliGRAM(s) IntraMuscular once PRN Glucose LESS THAN 70 milligrams/deciliter  metoclopramide Injectable 10 milliGRAM(s) IV Push every 6 hours PRN Nausea and/or Vomiting -2nd Line  ondansetron Injectable 4 milliGRAM(s) IV Push every 6 hours PRN Nausea and/or Vomiting 1st line  zinc oxide 20% Ointment 1 Application(s) Topical every 6 hours PRN skin blisters  zinc oxide 40% Ointment 1 Application(s) Topical every 4 hours PRN skin abrasion

## 2019-05-07 NOTE — PROGRESS NOTE ADULT - NSHPATTENDINGPLANDISCUSS_GEN_ALL_CORE
OB Team
Ob Housestaff
Ms. Rangel and Ob-GYN residents
Dr. Benson and OB staff
Dwaine Francois and Aden and NP.

## 2019-05-07 NOTE — PROGRESS NOTE ADULT - ASSESSMENT
A&P: 39y Female   s/p repeat c/s         complicated by preeclampsia with severe features and readmission for wound infection    1. Preeclampsia:   Continue IV Magnesium @2G/hr for 24 hrs   Magnesium clinical checks and serum assay q6 hrs.  Next Mg check @ 4:30a  No complaints currently.   BP mild range 300mg QID of labetalol  2. GI: Diet: Clears as tolerated  3. Neuro: PO pain medications PRN, hold NSAIDs  4. : strict Is and Os, vodinging

## 2019-05-07 NOTE — PROVIDER CONTACT NOTE (OTHER) - DATE AND TIME:
01-May-2019 19:08
02-May-2019 09:29
02-May-2019 10:45
06-May-2019 14:30
07-May-2019 19:28
05-May-2019

## 2019-05-07 NOTE — PROGRESS NOTE ADULT - SUBJECTIVE AND OBJECTIVE BOX
INTERVAL HPI/OVERNIGHT EVENTS:    Patient seen at bedside. Hypertensive  to 180 with visual changes and headache yesterday evening and started on Magnesium IV.  BP WDL. Pt reports only having ice chips and juice when she's "low," but BG has not been below 100. BG rarely over 150.       Pt reports the following symptoms:    CONSTITUTIONAL:  Negative fever or chills  EYES:  Negative  blurry vision or double vision  CARDIOVASCULAR:  Negative for chest pain or palpitations  RESPIRATORY:  Negative for cough, wheezing, or SOB   GASTROINTESTINAL:  Negative for nausea, vomiting, diarrhea, constipation, or abdominal pain  GENITOURINARY:  Negative frequency, urgency or dysuria  NEUROLOGIC:  No headache, confusion, dizziness, lightheadedness    MEDICATIONS  (STANDING):  ALBUTerol/ipratropium for Nebulization 3 milliLiter(s) Nebulizer every 6 hours  dextrose 5%. 1000 milliLiter(s) (50 mL/Hr) IV Continuous <Continuous>  dextrose 50% Injectable 12.5 Gram(s) IV Push once  dextrose 50% Injectable 25 Gram(s) IV Push once  dextrose 50% Injectable 25 Gram(s) IV Push once  docusate sodium 100 milliGRAM(s) Oral three times a day  enoxaparin Injectable 40 milliGRAM(s) SubCutaneous daily  ibuprofen  Tablet. 600 milliGRAM(s) Oral every 6 hours  insulin lispro (HumaLOG) corrective regimen sliding scale   SubCutaneous Before meals and at bedtime  iohexol 300 mG (iodine)/mL Oral Solution 30 milliLiter(s) Oral once  labetalol 300 milliGRAM(s) Oral every 8 hours  linezolid    Tablet 600 milliGRAM(s) Oral every 12 hours  oxyCODONE    5 mG/acetaminophen 325 mG 2 Tablet(s) Oral every 6 hours  simethicone 80 milliGRAM(s) Chew every 8 hours  trimethoprim  160 mG/sulfamethoxazole 800 mG 1 Tablet(s) Oral every 12 hours    MEDICATIONS  (PRN):  ALBUTerol    90 MICROgram(s) HFA Inhaler 2 Puff(s) Inhalation every 6 hours PRN Shortness of Breath and/or Wheezing  dextrose 40% Gel 15 Gram(s) Oral once PRN Blood Glucose LESS THAN 70 milliGRAM(s)/deciliter  diphenhydrAMINE 50 milliGRAM(s) Oral every 6 hours PRN Rash and/or Itching  glucagon  Injectable 1 milliGRAM(s) IntraMuscular once PRN Glucose LESS THAN 70 milligrams/deciliter  metoclopramide Injectable 10 milliGRAM(s) IV Push every 6 hours PRN Nausea and/or Vomiting -2nd Line  ondansetron Injectable 4 milliGRAM(s) IV Push every 6 hours PRN Nausea and/or Vomiting 1st line  zinc oxide 20% Ointment 1 Application(s) Topical every 6 hours PRN skin blisters  zinc oxide 40% Ointment 1 Application(s) Topical every 4 hours PRN skin abrasion      PHYSICAL EXAM  Vital Signs Last 24 Hrs  T(C): 36.6 (07 May 2019 15:30), Max: 36.8 (06 May 2019 18:30)  T(F): 97.8 (07 May 2019 15:30), Max: 98.2 (06 May 2019 18:30)  HR: 77 (07 May 2019 15:30) (70 - 80)  BP: 154/95 (07 May 2019 15:30) (117/72 - 181/101)  BP(mean): --  RR: 17 (07 May 2019 15:30) (16 - 18)  SpO2: 100% (07 May 2019 15:30) (96% - 100%)    Constitutional: wn/wd in NAD.   HEENT: NCAT, MMM, OP clear, EOMI, no proptosis or lid retraction  Neck: no thyromegaly or palpable thyroid nodules   Respiratory: lungs CTAB.  Cardiovascular: regular rhythm, normal S1 and S2, no audible murmurs, no peripheral edema  GI: soft, NT/ND, no masses/HSM appreciated.  Neurology: no tremors, DTR 2+  Skin: no visible rashes/lesions  Psychiatric: AAO x 3, normal affect/mood.    LABS:                        10.7   12.08 )-----------( 691      ( 07 May 2019 12:13 )             35.1     05-    135  |  97  |  5<L>  ----------------------------<  125<H>  3.4<L>   |  26  |  0.86    Ca    7.4<L>      07 May 2019 12:12  Mg     6.8         TPro  7.3  /  Alb  3.2<L>  /  TBili  0.2  /  DBili  x   /  AST  25  /  ALT  17  /  AlkPhos  115      HbA1C: 5.5 % ( @ 12:52)  6.0 % ( @ 11:31)    CAPILLARY BLOOD GLUCOSE  POCT Blood Glucose.: 148 mg/dL (07 May 2019 17:10)  POCT Blood Glucose.: 132 mg/dL (07 May 2019 11:27)  POCT Blood Glucose.: 121 mg/dL (07 May 2019 09:09)  POCT Blood Glucose.: 163 mg/dL (07 May 2019 07:24)  POCT Blood Glucose.: 189 mg/dL (06 May 2019 22:05)    A/P: 39yoF a PMH of DM, HTN, asthma, CIN3 s/p LEEP , CIN2 s/p LEEP , víctor c/b possible incarcerated bowel  s/p mesh placement requiring 3 additional surgeries, admitted for bleeding /  post-op complication  now s/p ex lap.    -Continue moderate SS at this point.     Pt can follow up at discharge with [facility] by calling  to make an appointment.   Will discuss case with Dr. Bowden  and update primary team

## 2019-05-08 ENCOUNTER — TRANSCRIPTION ENCOUNTER (OUTPATIENT)
Age: 39
End: 2019-05-08

## 2019-05-08 VITALS
TEMPERATURE: 99 F | SYSTOLIC BLOOD PRESSURE: 126 MMHG | RESPIRATION RATE: 18 BRPM | OXYGEN SATURATION: 98 % | HEART RATE: 88 BPM | DIASTOLIC BLOOD PRESSURE: 80 MMHG

## 2019-05-08 LAB
GLUCOSE BLDC GLUCOMTR-MCNC: 114 MG/DL — HIGH (ref 70–99)
GLUCOSE BLDC GLUCOMTR-MCNC: 159 MG/DL — HIGH (ref 70–99)

## 2019-05-08 PROCEDURE — 96365 THER/PROPH/DIAG IV INF INIT: CPT

## 2019-05-08 PROCEDURE — 96375 TX/PRO/DX INJ NEW DRUG ADDON: CPT

## 2019-05-08 PROCEDURE — 82570 ASSAY OF URINE CREATININE: CPT

## 2019-05-08 PROCEDURE — 94640 AIRWAY INHALATION TREATMENT: CPT

## 2019-05-08 PROCEDURE — 87070 CULTURE OTHR SPECIMN AEROBIC: CPT

## 2019-05-08 PROCEDURE — 85730 THROMBOPLASTIN TIME PARTIAL: CPT

## 2019-05-08 PROCEDURE — 82962 GLUCOSE BLOOD TEST: CPT

## 2019-05-08 PROCEDURE — 87040 BLOOD CULTURE FOR BACTERIA: CPT

## 2019-05-08 PROCEDURE — 36415 COLL VENOUS BLD VENIPUNCTURE: CPT

## 2019-05-08 PROCEDURE — 80053 COMPREHEN METABOLIC PANEL: CPT

## 2019-05-08 PROCEDURE — 83036 HEMOGLOBIN GLYCOSYLATED A1C: CPT

## 2019-05-08 PROCEDURE — 86921 COMPATIBILITY TEST INCUBATE: CPT

## 2019-05-08 PROCEDURE — 80202 ASSAY OF VANCOMYCIN: CPT

## 2019-05-08 PROCEDURE — 86870 RBC ANTIBODY IDENTIFICATION: CPT

## 2019-05-08 PROCEDURE — 84550 ASSAY OF BLOOD/URIC ACID: CPT

## 2019-05-08 PROCEDURE — 86900 BLOOD TYPING SEROLOGIC ABO: CPT

## 2019-05-08 PROCEDURE — 96376 TX/PRO/DX INJ SAME DRUG ADON: CPT

## 2019-05-08 PROCEDURE — 86922 COMPATIBILITY TEST ANTIGLOB: CPT

## 2019-05-08 PROCEDURE — 86880 COOMBS TEST DIRECT: CPT

## 2019-05-08 PROCEDURE — 96361 HYDRATE IV INFUSION ADD-ON: CPT

## 2019-05-08 PROCEDURE — 87075 CULTR BACTERIA EXCEPT BLOOD: CPT

## 2019-05-08 PROCEDURE — 93970 EXTREMITY STUDY: CPT

## 2019-05-08 PROCEDURE — 74176 CT ABD & PELVIS W/O CONTRAST: CPT

## 2019-05-08 PROCEDURE — 87186 SC STD MICRODIL/AGAR DIL: CPT

## 2019-05-08 PROCEDURE — 74177 CT ABD & PELVIS W/CONTRAST: CPT

## 2019-05-08 PROCEDURE — 86850 RBC ANTIBODY SCREEN: CPT

## 2019-05-08 PROCEDURE — 81001 URINALYSIS AUTO W/SCOPE: CPT

## 2019-05-08 PROCEDURE — 36430 TRANSFUSION BLD/BLD COMPNT: CPT

## 2019-05-08 PROCEDURE — P9016: CPT

## 2019-05-08 PROCEDURE — 87205 SMEAR GRAM STAIN: CPT

## 2019-05-08 PROCEDURE — 83735 ASSAY OF MAGNESIUM: CPT

## 2019-05-08 PROCEDURE — 84156 ASSAY OF PROTEIN URINE: CPT

## 2019-05-08 PROCEDURE — 85027 COMPLETE CBC AUTOMATED: CPT

## 2019-05-08 PROCEDURE — 87184 SC STD DISK METHOD PER PLATE: CPT

## 2019-05-08 PROCEDURE — 99285 EMERGENCY DEPT VISIT HI MDM: CPT | Mod: 25

## 2019-05-08 PROCEDURE — 85025 COMPLETE CBC W/AUTO DIFF WBC: CPT

## 2019-05-08 PROCEDURE — 85610 PROTHROMBIN TIME: CPT

## 2019-05-08 PROCEDURE — 83615 LACTATE (LD) (LDH) ENZYME: CPT

## 2019-05-08 PROCEDURE — 80048 BASIC METABOLIC PNL TOTAL CA: CPT

## 2019-05-08 PROCEDURE — 86901 BLOOD TYPING SEROLOGIC RH(D): CPT

## 2019-05-08 RX ORDER — LINEZOLID 600 MG/300ML
1 INJECTION, SOLUTION INTRAVENOUS
Qty: 12 | Refills: 0
Start: 2019-05-08 | End: 2019-05-13

## 2019-05-08 RX ORDER — FLUCONAZOLE 150 MG/1
150 TABLET ORAL ONCE
Qty: 0 | Refills: 0 | Status: COMPLETED | OUTPATIENT
Start: 2019-05-08 | End: 2019-05-08

## 2019-05-08 RX ORDER — LABETALOL HCL 100 MG
1 TABLET ORAL
Qty: 90 | Refills: 0
Start: 2019-05-08 | End: 2019-06-06

## 2019-05-08 RX ADMIN — Medication 2: at 11:39

## 2019-05-08 RX ADMIN — Medication 300 MILLIGRAM(S): at 12:39

## 2019-05-08 RX ADMIN — Medication 100 MILLIGRAM(S): at 11:38

## 2019-05-08 RX ADMIN — Medication 1 TABLET(S): at 07:27

## 2019-05-08 RX ADMIN — OXYCODONE AND ACETAMINOPHEN 2 TABLET(S): 5; 325 TABLET ORAL at 01:30

## 2019-05-08 RX ADMIN — Medication 300 MILLIGRAM(S): at 06:53

## 2019-05-08 RX ADMIN — LINEZOLID 600 MILLIGRAM(S): 600 INJECTION, SOLUTION INTRAVENOUS at 06:53

## 2019-05-08 RX ADMIN — Medication 600 MILLIGRAM(S): at 06:50

## 2019-05-08 RX ADMIN — Medication 600 MILLIGRAM(S): at 12:30

## 2019-05-08 RX ADMIN — ENOXAPARIN SODIUM 40 MILLIGRAM(S): 100 INJECTION SUBCUTANEOUS at 11:40

## 2019-05-08 RX ADMIN — Medication 600 MILLIGRAM(S): at 07:34

## 2019-05-08 RX ADMIN — Medication 600 MILLIGRAM(S): at 00:43

## 2019-05-08 RX ADMIN — FLUCONAZOLE 150 MILLIGRAM(S): 150 TABLET ORAL at 12:39

## 2019-05-08 RX ADMIN — OXYCODONE AND ACETAMINOPHEN 2 TABLET(S): 5; 325 TABLET ORAL at 07:36

## 2019-05-08 RX ADMIN — SIMETHICONE 80 MILLIGRAM(S): 80 TABLET, CHEWABLE ORAL at 11:39

## 2019-05-08 RX ADMIN — Medication 10 MILLIGRAM(S): at 04:33

## 2019-05-08 RX ADMIN — ONDANSETRON 4 MILLIGRAM(S): 8 TABLET, FILM COATED ORAL at 11:40

## 2019-05-08 RX ADMIN — ONDANSETRON 4 MILLIGRAM(S): 8 TABLET, FILM COATED ORAL at 03:21

## 2019-05-08 RX ADMIN — OXYCODONE AND ACETAMINOPHEN 2 TABLET(S): 5; 325 TABLET ORAL at 11:39

## 2019-05-08 RX ADMIN — OXYCODONE AND ACETAMINOPHEN 2 TABLET(S): 5; 325 TABLET ORAL at 12:30

## 2019-05-08 RX ADMIN — Medication 600 MILLIGRAM(S): at 11:38

## 2019-05-08 RX ADMIN — OXYCODONE AND ACETAMINOPHEN 2 TABLET(S): 5; 325 TABLET ORAL at 02:14

## 2019-05-08 NOTE — DISCHARGE NOTE OB - HOSPITAL COURSE
pt admitted from postoperatively from exlap and wound washout for intraabdominal hematoma. Pt has been on multiple antibiotics during stay. Afebrile. Sent home on linezolid and bactrim. wet-dry dressing changes by VNS. IV mag during stay for PEC, on labetalol 300, three times a day. Controlled. Endocrine consulted for DM. to follow up outpt.  Pt ambulating and tolerating PO at the time of discharge. VSS

## 2019-05-08 NOTE — DISCHARGE NOTE OB - MEDICATION SUMMARY - MEDICATIONS TO TAKE
I will START or STAY ON the medications listed below when I get home from the hospital:    oxycodone-acetaminophen 5 mg-325 mg oral tablet  -- 1 tab(s) by mouth every 6 hours, As Needed -for moderate pain MDD:4   -- Indication: For pain    labetalol 300 mg oral tablet  -- 1 tab(s) by mouth every 8 hours MDD:3  -- Indication: For high blood pressure    linezolid 600 mg oral tablet  -- 1 tab(s) by mouth every 12 hours MDD:2  -- Indication: For wound infection    sulfamethoxazole-trimethoprim 800 mg-160 mg oral tablet  -- 2 tab(s) by mouth every 12 hours MDD:4  -- Indication: For wound infection

## 2019-05-08 NOTE — DISCHARGE NOTE OB - CARE PLAN
Principal Discharge DX:	Rectus sheath hematoma, initial encounter  Goal:	Follow up  Assessment and plan of treatment:	Follow up with Dr. Villa on Friday at 1100.  Continue antibiotics for 6 days.  Secondary Diagnosis:	Diabetes mellitus of other type without complication  Goal:	Follow up  Assessment and plan of treatment:	Go see endocrinologist on Tuesday May 14th at 1100.  Secondary Diagnosis:	Essential hypertension  Goal:	Continue BP medication  Assessment and plan of treatment:	If you have headache, blurry vision, chest pain, difficulty breathing, or severe abdominal pain, call the doctor or return to the hospital. Continue labetalol 300 TID Principal Discharge DX:	Rectus sheath hematoma, initial encounter  Goal:	Follow up  Assessment and plan of treatment:	Follow up with Dr. Villa on Friday at 1100.  Continue antibiotics (linezolid and bactrim) for 6 days. VNS coming, try and change dressing wet to dry twice a day for a week after discharge.  Secondary Diagnosis:	Diabetes mellitus of other type without complication  Goal:	Follow up  Assessment and plan of treatment:	Go see endocrinologist on Tuesday May 14th at 1100.  Secondary Diagnosis:	Essential hypertension  Goal:	Continue BP medication  Assessment and plan of treatment:	If you have headache, blurry vision, chest pain, difficulty breathing, or severe abdominal pain, call the doctor or return to the hospital. Continue labetalol 300 TID

## 2019-05-08 NOTE — DISCHARGE NOTE OB - CARE PROVIDER_API CALL
Pelon Villa)  Obstetrics and Gynecology  [address] [id number]  Phone: [phone]  Fax: [phone]  Follow Up Time:

## 2019-05-08 NOTE — PROGRESS NOTE ADULT - ASSESSMENT
40yo with hx of severe preeclampsia, HTN, T2DM s/p C/S#3 on 4/20 presenting for severe abdominal pain, concern for intraabdominal bleeding, now POD8 s/p exploratory laparotomy and evacuation of hemoperitoneum, stable. Incision now improving after purulent drainage on POD 4. s/p ABBIE drain on 5/2.   Pt remains afebrile and WBC normal. ID consulted, recommend vancomycin 15mg/kg q8 and metronidazole PO 500mg q8. Gen surg signed off, no intervention at this time. Wound culture growing enterococcus, proteus and strep aginosus. Bedside wet-to-dry dressings, change 1-2 times a day  Si PEC: patient now s/p mag for the second time for elevated BP to 180s requring 4 pushes of IV antihypertensives, patient denies toxic symptoms this morning, normal to mild range BP o/n no severe range BP, patient to go home on labetaolol  1. Pain: Motrin ATC, , oxycodone prn, zinc oxide prn skin blisters  2. CV: labetalol 200 BID for HTN. asymptomatic s/p Iv magnesium on initial admission   3. Pulm: albuterol prn for wheezing.  3. GI: diabetic DASH, simethicone, colace   3. : voiding  4. DVT prophylaxis: lovenox,  SCDs, ambulation  5. Endo: continue moderate ISS per endocrine, to be d/c on 500mg BID metoformin  6. Heme: Hgb stable s/p 2u PRBC. AM labs pending   8. ID: [id number] BID/linezolid 600mg BID x7 days s/p Aztreonam 1g q8, vancomycin 1500mg IV q12, PO metronidazole 500mg q8.  s/p clindamycin (5/1-2),   7. Dispo: pending stable clinical status

## 2019-05-08 NOTE — PROGRESS NOTE ADULT - SUBJECTIVE AND OBJECTIVE BOX
Patient evaluated at bedside.   She reports pain is well controlled with OPM, still continues to feel nauseous and had one episode of vomiting this morning, patient would like pain medications prior to dressing change      She denies HA, changes in vision, dizziness, CP, palpitations, SOB, n/v, or heavy vaginal bleeding.    Physical Exam:  Vital Signs Last 24 Hrs  T(C): 36.8 (07 May 2019 23:30), Max: 36.8 (07 May 2019 23:30)  T(F): 98.3 (07 May 2019 23:30), Max: 98.3 (07 May 2019 23:30)  HR: 72 (07 May 2019 23:30) (70 - 78)  BP: 136/84 (07 May 2019 23:30) (118/76 - 154/95)  BP(mean): --  RR: 16 (07 May 2019 23:30) (14 - 18)  SpO2: 100% (07 May 2019 23:30) (97% - 100%)    Gen: NAD  Abd: + BS, soft, nontender, nondistended, no rebound or guarding  Incision clean, dry and intact  uterus firm at midline  : lochia WNL  Extremities: no swelling or calf tenderness                          10.7   12.08 )-----------( 691      ( 07 May 2019 12:13 )             35.1     05-07    135  |  97  |  5<L>  ----------------------------<  125<H>  3.4<L>   |  26  |  0.86    Ca    7.4<L>      07 May 2019 12:12  Mg     6.8     05-07    TPro  7.3  /  Alb  3.2<L>  /  TBili  0.2  /  DBili  x   /  AST  25  /  ALT  17  /  AlkPhos  115  05-07      MEDICATIONS  (STANDING):  ALBUTerol/ipratropium for Nebulization 3 milliLiter(s) Nebulizer every 6 hours  dextrose 5%. 1000 milliLiter(s) (50 mL/Hr) IV Continuous <Continuous>  dextrose 50% Injectable 12.5 Gram(s) IV Push once  dextrose 50% Injectable 25 Gram(s) IV Push once  dextrose 50% Injectable 25 Gram(s) IV Push once  docusate sodium 100 milliGRAM(s) Oral three times a day  enoxaparin Injectable 40 milliGRAM(s) SubCutaneous daily  ibuprofen  Tablet. 600 milliGRAM(s) Oral every 6 hours  insulin lispro (HumaLOG) corrective regimen sliding scale   SubCutaneous Before meals and at bedtime  iohexol 300 mG (iodine)/mL Oral Solution 30 milliLiter(s) Oral once  labetalol 300 milliGRAM(s) Oral every 8 hours  linezolid    Tablet 600 milliGRAM(s) Oral every 12 hours  oxyCODONE    5 mG/acetaminophen 325 mG 2 Tablet(s) Oral every 6 hours  simethicone 80 milliGRAM(s) Chew every 8 hours  trimethoprim  160 mG/sulfamethoxazole 800 mG 1 Tablet(s) Oral every 12 hours    MEDICATIONS  (PRN):  ALBUTerol    90 MICROgram(s) HFA Inhaler 2 Puff(s) Inhalation every 6 hours PRN Shortness of Breath and/or Wheezing  dextrose 40% Gel 15 Gram(s) Oral once PRN Blood Glucose LESS THAN 70 milliGRAM(s)/deciliter  diphenhydrAMINE 50 milliGRAM(s) Oral every 6 hours PRN Rash and/or Itching  glucagon  Injectable 1 milliGRAM(s) IntraMuscular once PRN Glucose LESS THAN 70 milligrams/deciliter  metoclopramide Injectable 10 milliGRAM(s) IV Push every 6 hours PRN Nausea and/or Vomiting -2nd Line  ondansetron Injectable 4 milliGRAM(s) IV Push every 6 hours PRN Nausea and/or Vomiting 1st line  zinc oxide 20% Ointment 1 Application(s) Topical every 6 hours PRN skin blisters  zinc oxide 40% Ointment 1 Application(s) Topical every 4 hours PRN skin abrasion

## 2019-05-08 NOTE — DISCHARGE NOTE OB - PLAN OF CARE
Follow up Follow up with Dr. Villa on Friday at 1100.  Continue antibiotics for 6 days. Go see endocrinologist on Tuesday May 14th at 1100. Continue BP medication If you have headache, blurry vision, chest pain, difficulty breathing, or severe abdominal pain, call the doctor or return to the hospital. Continue labetalol 300 TID Follow up with Dr. Villa on Friday at 1100.  Continue antibiotics (linezolid and bactrim) for 6 days. VNS coming, try and change dressing wet to dry twice a day for a week after discharge.

## 2019-05-08 NOTE — PROGRESS NOTE ADULT - PROVIDER SPECIALTY LIST ADULT
Endocrinology
Infectious Disease
OB
Surgery
Surgery
OB
OB

## 2019-05-08 NOTE — PROGRESS NOTE ADULT - REASON FOR ADMISSION
wound complication

## 2019-05-14 ENCOUNTER — APPOINTMENT (OUTPATIENT)
Dept: ENDOCRINOLOGY | Facility: CLINIC | Age: 39
End: 2019-05-14

## 2019-05-16 DIAGNOSIS — R00.0 TACHYCARDIA, UNSPECIFIED: ICD-10-CM

## 2019-05-16 DIAGNOSIS — B95.5 UNSPECIFIED STREPTOCOCCUS AS THE CAUSE OF DISEASES CLASSIFIED ELSEWHERE: ICD-10-CM
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
## 2019-05-16 DIAGNOSIS — O99.89 OTHER SPECIFIED DISEASES AND CONDITIONS COMPLICATING PREGNANCY, CHILDBIRTH AND THE PUERPERIUM: ICD-10-CM

## 2019-05-16 DIAGNOSIS — L02.211 CUTANEOUS ABSCESS OF ABDOMINAL WALL: ICD-10-CM

## 2019-05-16 DIAGNOSIS — B96.4 PROTEUS (MIRABILIS) (MORGANII) AS THE CAUSE OF DISEASES CLASSIFIED ELSEWHERE: ICD-10-CM

## 2019-05-16 DIAGNOSIS — E78.5 HYPERLIPIDEMIA, UNSPECIFIED: ICD-10-CM

## 2019-05-22 ENCOUNTER — APPOINTMENT (OUTPATIENT)
Dept: ENDOCRINOLOGY | Facility: CLINIC | Age: 39
End: 2019-05-22

## 2019-05-31 ENCOUNTER — EMERGENCY (EMERGENCY)
Facility: HOSPITAL | Age: 39
LOS: 1 days | Discharge: HOME | End: 2019-05-31
Attending: EMERGENCY MEDICINE | Admitting: EMERGENCY MEDICINE
Payer: MEDICAID

## 2019-05-31 VITALS
SYSTOLIC BLOOD PRESSURE: 179 MMHG | HEIGHT: 67 IN | RESPIRATION RATE: 18 BRPM | HEART RATE: 94 BPM | TEMPERATURE: 98 F | WEIGHT: 199.96 LBS | DIASTOLIC BLOOD PRESSURE: 112 MMHG | OXYGEN SATURATION: 99 %

## 2019-05-31 VITALS — DIASTOLIC BLOOD PRESSURE: 79 MMHG | SYSTOLIC BLOOD PRESSURE: 163 MMHG | HEART RATE: 88 BPM | RESPIRATION RATE: 20 BRPM

## 2019-05-31 DIAGNOSIS — Z91.013 ALLERGY TO SEAFOOD: ICD-10-CM

## 2019-05-31 DIAGNOSIS — Z98.89 OTHER SPECIFIED POSTPROCEDURAL STATES: Chronic | ICD-10-CM

## 2019-05-31 DIAGNOSIS — Z98.890 OTHER SPECIFIED POSTPROCEDURAL STATES: Chronic | ICD-10-CM

## 2019-05-31 DIAGNOSIS — I16.0 HYPERTENSIVE URGENCY: ICD-10-CM

## 2019-05-31 DIAGNOSIS — I10 ESSENTIAL (PRIMARY) HYPERTENSION: ICD-10-CM

## 2019-05-31 DIAGNOSIS — Z88.1 ALLERGY STATUS TO OTHER ANTIBIOTIC AGENTS STATUS: ICD-10-CM

## 2019-05-31 DIAGNOSIS — Z79.899 OTHER LONG TERM (CURRENT) DRUG THERAPY: ICD-10-CM

## 2019-05-31 DIAGNOSIS — E78.5 HYPERLIPIDEMIA, UNSPECIFIED: ICD-10-CM

## 2019-05-31 DIAGNOSIS — Z88.0 ALLERGY STATUS TO PENICILLIN: ICD-10-CM

## 2019-05-31 DIAGNOSIS — Z88.8 ALLERGY STATUS TO OTHER DRUGS, MEDICAMENTS AND BIOLOGICAL SUBSTANCES: ICD-10-CM

## 2019-05-31 LAB
ALBUMIN SERPL ELPH-MCNC: 3.7 G/DL — SIGNIFICANT CHANGE UP (ref 3.3–5)
ALP SERPL-CCNC: 104 U/L — SIGNIFICANT CHANGE UP (ref 40–120)
ALT FLD-CCNC: 14 U/L — SIGNIFICANT CHANGE UP (ref 10–45)
ANION GAP SERPL CALC-SCNC: 10 MMOL/L — SIGNIFICANT CHANGE UP (ref 5–17)
APPEARANCE UR: CLEAR — SIGNIFICANT CHANGE UP
AST SERPL-CCNC: 18 U/L — SIGNIFICANT CHANGE UP (ref 10–40)
BASOPHILS # BLD AUTO: 0.02 K/UL — SIGNIFICANT CHANGE UP (ref 0–0.2)
BASOPHILS NFR BLD AUTO: 0.2 % — SIGNIFICANT CHANGE UP (ref 0–2)
BILIRUB SERPL-MCNC: 0.2 MG/DL — SIGNIFICANT CHANGE UP (ref 0.2–1.2)
BILIRUB UR-MCNC: NEGATIVE — SIGNIFICANT CHANGE UP
BUN SERPL-MCNC: 14 MG/DL — SIGNIFICANT CHANGE UP (ref 7–23)
CALCIUM SERPL-MCNC: 9.2 MG/DL — SIGNIFICANT CHANGE UP (ref 8.4–10.5)
CHLORIDE SERPL-SCNC: 106 MMOL/L — SIGNIFICANT CHANGE UP (ref 96–108)
CO2 SERPL-SCNC: 25 MMOL/L — SIGNIFICANT CHANGE UP (ref 22–31)
COLOR SPEC: YELLOW — SIGNIFICANT CHANGE UP
CREAT SERPL-MCNC: 0.76 MG/DL — SIGNIFICANT CHANGE UP (ref 0.5–1.3)
DIFF PNL FLD: NEGATIVE — SIGNIFICANT CHANGE UP
EOSINOPHIL # BLD AUTO: 0.24 K/UL — SIGNIFICANT CHANGE UP (ref 0–0.5)
EOSINOPHIL NFR BLD AUTO: 2.2 % — SIGNIFICANT CHANGE UP (ref 0–6)
GLUCOSE SERPL-MCNC: 107 MG/DL — HIGH (ref 70–99)
GLUCOSE UR QL: NEGATIVE — SIGNIFICANT CHANGE UP
HCT VFR BLD CALC: 37.9 % — SIGNIFICANT CHANGE UP (ref 34.5–45)
HGB BLD-MCNC: 11.7 G/DL — SIGNIFICANT CHANGE UP (ref 11.5–15.5)
IMM GRANULOCYTES NFR BLD AUTO: 0.5 % — SIGNIFICANT CHANGE UP (ref 0–1.5)
KETONES UR-MCNC: ABNORMAL MG/DL
LDH SERPL L TO P-CCNC: 243 U/L — HIGH (ref 50–242)
LEUKOCYTE ESTERASE UR-ACNC: NEGATIVE — SIGNIFICANT CHANGE UP
LYMPHOCYTES # BLD AUTO: 28 % — SIGNIFICANT CHANGE UP (ref 13–44)
LYMPHOCYTES # BLD AUTO: 3.05 K/UL — SIGNIFICANT CHANGE UP (ref 1–3.3)
MAGNESIUM SERPL-MCNC: 1.9 MG/DL — SIGNIFICANT CHANGE UP (ref 1.6–2.6)
MCHC RBC-ENTMCNC: 27.1 PG — SIGNIFICANT CHANGE UP (ref 27–34)
MCHC RBC-ENTMCNC: 30.9 GM/DL — LOW (ref 32–36)
MCV RBC AUTO: 87.7 FL — SIGNIFICANT CHANGE UP (ref 80–100)
MONOCYTES # BLD AUTO: 0.96 K/UL — HIGH (ref 0–0.9)
MONOCYTES NFR BLD AUTO: 8.8 % — SIGNIFICANT CHANGE UP (ref 2–14)
NEUTROPHILS # BLD AUTO: 6.58 K/UL — SIGNIFICANT CHANGE UP (ref 1.8–7.4)
NEUTROPHILS NFR BLD AUTO: 60.3 % — SIGNIFICANT CHANGE UP (ref 43–77)
NITRITE UR-MCNC: NEGATIVE — SIGNIFICANT CHANGE UP
NRBC # BLD: 0 /100 WBCS — SIGNIFICANT CHANGE UP (ref 0–0)
PH UR: 6 — SIGNIFICANT CHANGE UP (ref 5–8)
PLATELET # BLD AUTO: 369 K/UL — SIGNIFICANT CHANGE UP (ref 150–400)
POTASSIUM SERPL-MCNC: 4.4 MMOL/L — SIGNIFICANT CHANGE UP (ref 3.5–5.3)
POTASSIUM SERPL-SCNC: 4.4 MMOL/L — SIGNIFICANT CHANGE UP (ref 3.5–5.3)
PROT SERPL-MCNC: 7 G/DL — SIGNIFICANT CHANGE UP (ref 6–8.3)
PROT UR-MCNC: NEGATIVE MG/DL — SIGNIFICANT CHANGE UP
RBC # BLD: 4.32 M/UL — SIGNIFICANT CHANGE UP (ref 3.8–5.2)
RBC # FLD: 17.5 % — HIGH (ref 10.3–14.5)
SODIUM SERPL-SCNC: 141 MMOL/L — SIGNIFICANT CHANGE UP (ref 135–145)
SP GR SPEC: 1.02 — SIGNIFICANT CHANGE UP (ref 1–1.03)
TROPONIN T SERPL-MCNC: <0.01 NG/ML — SIGNIFICANT CHANGE UP (ref 0–0.01)
TROPONIN T SERPL-MCNC: <0.01 NG/ML — SIGNIFICANT CHANGE UP (ref 0–0.01)
URATE SERPL-MCNC: 5.2 MG/DL — SIGNIFICANT CHANGE UP (ref 2.5–7)
UROBILINOGEN FLD QL: 0.2 E.U./DL — SIGNIFICANT CHANGE UP
WBC # BLD: 10.9 K/UL — HIGH (ref 3.8–10.5)
WBC # FLD AUTO: 10.9 K/UL — HIGH (ref 3.8–10.5)

## 2019-05-31 PROCEDURE — 36415 COLL VENOUS BLD VENIPUNCTURE: CPT

## 2019-05-31 PROCEDURE — 84484 ASSAY OF TROPONIN QUANT: CPT

## 2019-05-31 PROCEDURE — 70450 CT HEAD/BRAIN W/O DYE: CPT | Mod: 26

## 2019-05-31 PROCEDURE — 80053 COMPREHEN METABOLIC PANEL: CPT

## 2019-05-31 PROCEDURE — 96376 TX/PRO/DX INJ SAME DRUG ADON: CPT

## 2019-05-31 PROCEDURE — 84550 ASSAY OF BLOOD/URIC ACID: CPT

## 2019-05-31 PROCEDURE — 85025 COMPLETE CBC W/AUTO DIFF WBC: CPT

## 2019-05-31 PROCEDURE — 99285 EMERGENCY DEPT VISIT HI MDM: CPT | Mod: 25

## 2019-05-31 PROCEDURE — 71045 X-RAY EXAM CHEST 1 VIEW: CPT | Mod: 26

## 2019-05-31 PROCEDURE — 96374 THER/PROPH/DIAG INJ IV PUSH: CPT

## 2019-05-31 PROCEDURE — 70450 CT HEAD/BRAIN W/O DYE: CPT

## 2019-05-31 PROCEDURE — 81003 URINALYSIS AUTO W/O SCOPE: CPT

## 2019-05-31 PROCEDURE — 71045 X-RAY EXAM CHEST 1 VIEW: CPT

## 2019-05-31 PROCEDURE — 83735 ASSAY OF MAGNESIUM: CPT

## 2019-05-31 PROCEDURE — 96375 TX/PRO/DX INJ NEW DRUG ADDON: CPT

## 2019-05-31 PROCEDURE — 83615 LACTATE (LD) (LDH) ENZYME: CPT

## 2019-05-31 RX ORDER — METOCLOPRAMIDE HCL 10 MG
10 TABLET ORAL ONCE
Refills: 0 | Status: COMPLETED | OUTPATIENT
Start: 2019-05-31 | End: 2019-05-31

## 2019-05-31 RX ORDER — NIFEDIPINE 30 MG
30 TABLET, EXTENDED RELEASE 24 HR ORAL ONCE
Refills: 0 | Status: COMPLETED | OUTPATIENT
Start: 2019-05-31 | End: 2019-05-31

## 2019-05-31 RX ORDER — LABETALOL HCL 100 MG
200 TABLET ORAL ONCE
Refills: 0 | Status: DISCONTINUED | OUTPATIENT
Start: 2019-05-31 | End: 2019-05-31

## 2019-05-31 RX ORDER — ACETAMINOPHEN 500 MG
1000 TABLET ORAL ONCE
Refills: 0 | Status: COMPLETED | OUTPATIENT
Start: 2019-05-31 | End: 2019-05-31

## 2019-05-31 RX ORDER — LABETALOL HCL 100 MG
40 TABLET ORAL ONCE
Refills: 0 | Status: COMPLETED | OUTPATIENT
Start: 2019-05-31 | End: 2019-05-31

## 2019-05-31 RX ORDER — KETOROLAC TROMETHAMINE 30 MG/ML
30 SYRINGE (ML) INJECTION ONCE
Refills: 0 | Status: DISCONTINUED | OUTPATIENT
Start: 2019-05-31 | End: 2019-05-31

## 2019-05-31 RX ORDER — LABETALOL HCL 100 MG
20 TABLET ORAL ONCE
Refills: 0 | Status: COMPLETED | OUTPATIENT
Start: 2019-05-31 | End: 2019-05-31

## 2019-05-31 RX ORDER — LABETALOL HCL 100 MG
1 TABLET ORAL
Qty: 90 | Refills: 0
Start: 2019-05-31 | End: 2019-06-29

## 2019-05-31 RX ORDER — METOPROLOL TARTRATE 50 MG
100 TABLET ORAL ONCE
Refills: 0 | Status: DISCONTINUED | OUTPATIENT
Start: 2019-05-31 | End: 2019-05-31

## 2019-05-31 RX ORDER — NIFEDIPINE 30 MG
1 TABLET, EXTENDED RELEASE 24 HR ORAL
Qty: 30 | Refills: 0
Start: 2019-05-31 | End: 2019-06-29

## 2019-05-31 RX ORDER — LABETALOL HCL 100 MG
300 TABLET ORAL ONCE
Refills: 0 | Status: COMPLETED | OUTPATIENT
Start: 2019-05-31 | End: 2019-05-31

## 2019-05-31 RX ADMIN — Medication 30 MILLIGRAM(S): at 21:58

## 2019-05-31 RX ADMIN — Medication 30 MILLIGRAM(S): at 20:54

## 2019-05-31 RX ADMIN — Medication 40 MILLIGRAM(S): at 19:58

## 2019-05-31 RX ADMIN — Medication 300 MILLIGRAM(S): at 19:14

## 2019-05-31 RX ADMIN — Medication 20 MILLIGRAM(S): at 18:52

## 2019-05-31 RX ADMIN — Medication 30 MILLIGRAM(S): at 20:53

## 2019-05-31 NOTE — ED PROVIDER NOTE - CLINICAL SUMMARY MEDICAL DECISION MAKING FREE TEXT BOX
Pt c/o ha (h/o migraines), blurred vision, cp, sob (h/o asthma), high bp (h/o htn, off meds since pregnancy) ? htn urgency vs eclampsia (pt w pre-eclampsia prior to delivery) vs migraine and asthma unrelated to her bp.  EKG w/o stemi.  Lung cta, sat nl.  Plan labs, ct head, bp control, pain meds for pt's abd pain 2/2 her wound dehiscence and wound infection s/p evacuation of hematoma in case pain is affecting pt's bp, gyn consult.  Consider magnesium if gyn concerned for eclampsia.  Reassess.

## 2019-05-31 NOTE — ED ADULT NURSE REASSESSMENT NOTE - NS ED NURSE REASSESS COMMENT FT1
Patient reports that she feels better than when she had first presented. Denies headache and blurred vision. Patient encouraged to be admitted but states that she does not want to be admitted because of her . Patient instructed to return to the ED if she experiences the same symptoms. Patient educated to the risks of going against medical advise by provider. Patient verbalized that she understands the risks. Patient instructed for close follow up with PMD in the outpatient setting. ED referrals coordinator number given along with PMD number.

## 2019-05-31 NOTE — ED PROVIDER NOTE - PMH
Abdominal hernia    Diabetes    Essential hypertension    Hyperlipidemia    Obesity Abdominal hernia    Diabetes    Essential hypertension    Hyperlipidemia    Obesity    Pre-eclampsia

## 2019-05-31 NOTE — ED ADULT NURSE NOTE - NSIMPLEMENTINTERV_GEN_ALL_ED
Implemented All Universal Safety Interventions:  [place] to call system. Call bell, personal items and telephone within reach. Instruct patient to call for assistance. Room bathroom lighting operational. Non-slip footwear when patient is off stretcher. Physically safe environment: no spills, clutter or unnecessary equipment. Stretcher in lowest position, wheels locked, appropriate side rails in place.

## 2019-05-31 NOTE — ED PROVIDER NOTE - NSFOLLOWUPINSTRUCTIONS_ED_ALL_ED_FT
Hypertensive Urgency  Migraine    Please take labetalol three times a day and nifedipine XL once a day.  Return for increased pain, difficulty breathing, palpitations, change in behavior, change in vision/speech/gait, numbness or weakness in extremities, vomiting, any other concerns.       It's very important you follow up with a pmd for your blood pressure.  Your new pmd may further change your blood pressure medication.    Continue your normal medications and the antibiotics.

## 2019-05-31 NOTE — ED PROVIDER NOTE - OBJECTIVE STATEMENT
38yo with hx of severe preeclampsia, HTN (reports she was on metoprolol 100 qday prior to pregnancy and only on asa since pregnancy but med record indicates pt supposed to be on labetalol 200 bid), T2DM s/p C/S#3 on 4/20 complicated by itnra-abd hematoma s/p ex lap and evacuation, wound dehiscence and infection treated w iv abx and currently on levaquin 38yo with hx of severe preeclampsia, HTN (reports she was on metoprolol 100 qday prior to pregnancy and only on asa since pregnancy but med record indicates pt supposed to be on labetalol 300 mg tid), T2DM s/p C/S#3 on 4/20 complicated by itnra-abd hematoma s/p ex lap and evacuation, wound dehiscence and infection treated w iv abx and currently on levaquin sent from gyn clinic for high bp and c/o ha, blurred vision L eye, cp, sob.  CP is sharp, intermittent, lasting a few sec to min w/o h/o similar x ~ 1 h pta.  HA is vise-like, similar to prior migraine.  No change in speech/gait, numbness or weakness in ext.  + mild sob.  No palpitations.  Pt attributed the ha to her migraines, sob to her asthma and high bp 2/2 pain in her abd which she's been having since delivery 2/2 the complications.  Pt notes wound still draining but improving since new abx.  No n/v/d.

## 2019-05-31 NOTE — ED PROVIDER NOTE - DIAGNOSTIC INTERPRETATION
ER Physician:  Nisha Director  CHEST XRAY INTERPRETATION: lungs clear, heart shadow normal, bony structures intact

## 2019-05-31 NOTE — ED PROVIDER NOTE - CARE PROVIDER_API CALL
Sherwin Duque)  Obstetrics and Gynecology  [address] [id number]  Phone: [phone]  Fax: [phone]  Follow Up Time:

## 2019-05-31 NOTE — ED ADULT TRIAGE NOTE - CHIEF COMPLAINT QUOTE
Patient states told by PMD to go to ED, c/o of elevated /110 at home, c/o of mild chest pains, headache and abdominal pain.  Delivered  2019.  BP in ED  (left ) 171/107 ;  ( Right )  179/112 .  EKG in progress.

## 2019-05-31 NOTE — ED PROVIDER NOTE - PROGRESS NOTE DETAILS
GYN consulted and will salima. Pt discussed w gyn - 6 wk post partum so outside of window for eclampsia.  Per gyn, pt has not been compliant w her meds and her labetalol was changed to 300 mg tid.  GYN recommends bp control but do not feel pt needs magnesium at this time. Per gyn, they are not concerned for preeclampsia/eclampsia this far out from delivery, pt also w nl eclampsia labs.  They also feel pt's wound infection is improving and that neither issue require GYN care.  GYN recommends med mgmt for pt's bp issues.  CT head neg, labs wnl including troponin.  BP still labile after additional meds.  Will try po nifedipine and reassess. Pt feeling better.  Pt offered admission but declined.  BP improving.  Plan for dc if 2nd trop neg and bp still improved.  Rx sent for labetalol 300 tid and nifedipine xl 60. Pt's name left for referral's coordinator to assist next week to try to obtain new pmd appt. Pt pending repeat trop and further eval of bp.  If neg trop and bp cont to improve, plan dc  Pt signed out to Dr Byrd and RACHELE Sweet. Repeat trop neg.  BP still elevated. Discussed admission again w pt [place] since no pmd to ensure fu.  Pt states all sx resolved and understands risks of cva, mi, renal injury, death but prefers dc 2/2 .  Will dc ama. Pt feeling better, all sx resolved.  Pt offered admission but declined.  BP improving.  Plan for dc if 2nd trop neg and bp still improved.  Rx sent for labetalol 300 tid and nifedipine xl 60. Pt's name left for referral's coordinator to assist next week to try to obtain new pmd appt.

## 2019-05-31 NOTE — CONSULT NOTE ADULT - SUBJECTIVE AND OBJECTIVE BOX
Pt is a 39 year 5 weeks 6 days post op after c/s with postpartum course c/b wound infection and superimposed preeclampsia, sent from clinic for BP of 180/100. Patient had a c/s on 4/20 c/b preeclampsia, was discharged on 200 of labetalol. She was re-admitted for wound infection 4/27, s/p IR drainage, discharged on antibiotics and 300 labetalol TID on 5/8. Patient never picked up labetalol from pharmacy, says when she went to pharmacy there wasn't a prescription for her. Inconsistent with taking her BPs at home. Reports headache that she has had since delivery, headache is mostly left sided. Says headache is "bad" but cannot give a rating of 1-10.     In terms of wound infection, reports taking levaquin at home, says she has 4 doses left. Reports feeling feverish but denies taking temperature at home. Now feeling SOB but denies CP. Denies changes in vision, LE edema, abdominal pain. Reports wound is healing well, denies fevers, chills, severe pain, vaginal bleeding.       Vital Signs Last 24 Hrs  T(C): 36.8 (31 May 2019 17:28), Max: 36.8 (31 May 2019 17:28)  T(F): 98.2 (31 May 2019 17:28), Max: 98.2 (31 May 2019 17:28)  HR: 94 (31 May 2019 17:28) (94 - 94)  BP: 179/112 (31 May 2019 17:28) (179/112 - 179/112)  BP(mean): --  RR: 18 (31 May 2019 17:28) (18 - 18)  SpO2: 99% (31 May 2019 17:28) (99% - 99%)    Gen NAD   CV RRR   Resp CTAB   Abd Soft, non tender, well healing pfannenstiel incision, packed with dry gauze, small 2 cm area of incision in midline still healing   ext no LE edema, homans negative, reflexes +2 bilaterally Pt is a 39 year 5 weeks 6 days post op after c/s with postpartum course c/b wound infection and superimposed preeclampsia, sent from clinic for BP of 180/100. Patient had a c/s on 4/20 c/b preeclampsia, was discharged on 200 of labetalol. She was re-admitted for wound infection 4/27, s/p IR drainage, discharged on antibiotics and 300 labetalol TID on 5/8. Patient never picked up labetalol from pharmacy, says when she went to pharmacy there wasn't a prescription for her. Inconsistent with taking her BPs at home. Reports headache that she has had since delivery, headache is mostly left sided. Says headache is "bad" but cannot give a rating of 1-10.     In terms of wound infection, reports taking levaquin at home, says she has 4 doses left. Reports feeling feverish but denies taking temperature at home. Now feeling SOB but denies CP. Denies changes in vision, LE edema, abdominal pain. Reports wound is healing well, denies fevers, chills, severe pain, vaginal bleeding.       Vital Signs Last 24 Hrs  T(C): 36.8 (31 May 2019 17:28), Max: 36.8 (31 May 2019 17:28)  T(F): 98.2 (31 May 2019 17:28), Max: 98.2 (31 May 2019 17:28)  HR: 94 (31 May 2019 17:28) (94 - 94)  BP: 179/112 (31 May 2019 17:28) (179/112 - 179/112)  BP(mean): --  RR: 18 (31 May 2019 17:28) (18 - 18)  SpO2: 99% (31 May 2019 17:28) (99% - 99%)    Gen NAD   CV RRR   Resp CTAB   Abd Soft, non tender, well healing pfannenstiel incision, packed with dry gauze, small 2 cm area of incision in midline still healing   ext no LE edema, homans negative, reflexes +2 bilaterally     < from: CT Head No Cont (05.31.19 @ 19:07) >  EXAM:  CT BRAIN                          PROCEDURE DATE:  05/31/2019          INTERPRETATION:  PROCEDURE: CT brain without intravenous contrast    INDICATION: Headache, hypertension    TECHNIQUE: Multiple axial images were obtained at 5 mm intervals from the   skull base to the vertex. Sagittal and coronal reformatted images were   obtained from the axial data set. The images were reviewed in brain and   bone windows.    COMPARISON: None    FINDINGS: The CT examination demonstrates the ventricles, cisternal   spaces, and cortical sulci to be within normal limits. There is no   midline shift or extra axial collections. The gray white differentiation   appears within normal limits. There is no intracranial hemorrhage or   acute transcortical infarct. The bony windows demonstrates no fractures.   The visualized paranasal sinuses are within normal limits. The mastoid   air cells are well aerated.    IMPRESSION: Normal noncontrast CT of the brain.    "Thank you for the opportunity to participate in the care of this   patient."    MIRELA RICHEY M.D., ATTENDING RADIOLOGIST  This document has been electronically signed. May 31 2019  7:15PM    < end of copied text >

## 2019-05-31 NOTE — CONSULT NOTE ADULT - ASSESSMENT
A/P:   -Pt is 6 weeks postpartum tomorrow, given patient's non compliance with BP medications and being almost 6 weeks postpartum preeclampsia is not likely, no plan to start IV magnesium as of now  -mostly likely dx is exacerbation of cHTN, uncontrolled 2/2 non-compliance   -plan to control BP currently with PO labetalol   -f/u full PEC labs   -f/u CXR A/P:   -Pt is 6 weeks postpartum tomorrow, given patient's non compliance with BP medications and being almost 6 weeks postpartum preeclampsia is not likely, no plan to start IV magnesium as of now  -mostly likely dx is exacerbation of cHTN, uncontrolled 2/2 non-compliance   -plan to control BP currently with IV labetalol pushes and PO labetalol  -f/u full labs   -f/u CXR   -plan for medicine recs for control of cHTN     D/w Dr. Molina A/P:   -Pt is 6 weeks postpartum tomorrow, given patient's non compliance with BP medications and being almost 6 weeks postpartum preeclampsia is not likely, no plan to start IV magnesium as of now  -mostly likely dx is exacerbation of cHTN, uncontrolled 2/2 non-compliance   -plan to control BP currently with IV labetalol pushes and PO labetalol  -f/u full labs   -f/u CXR   -no acute ob/gyn interventions needed   -plan for medicine recs for control of cHTN     D/w Dr. Molina

## 2019-05-31 NOTE — ED PROVIDER NOTE - NSFOLLOWUPCLINICS_GEN_ALL_ED_FT
[facility]  Family Medicine  [facility]. [address]  [place], [place] [facility]  Phone: [phone]  Fax:   Follow Up Time:

## 2019-06-04 NOTE — PROGRESS NOTE ADULT - MINUTES
You can access the [facility] Patient Portal, offered by [facility], by registering with the following website: [email or web address]
25
25

## 2019-06-07 NOTE — DISCHARGE NOTE OB - DO YOU HAVE DIFFICULTY CLIMBING STAIRS
Yes Patient seen and examined at team meeting this morning, chart reviewed. Per nursing staff, no acute overnight events. Patient isolative to room. Endorses some anxiety, but otherwise says shes well. She reports hearing voices from God, stating things like "God is with good people". No new complaints and has been compliant with medications, denies any adverse effects. Behaviorally stable, eating and drinking well. Denies s/h ideation.

## 2019-06-21 NOTE — CHART NOTE - NSCHARTNOTESELECT_GEN_ALL_CORE
ALERT team  note:  [place] insurance plan, [facility] [phone] evaluation completed today at 0730 by YANDEL Gomez, with recommendation to DC legal hold and pt f/u at [facility] outpt  services and housing today; while Patricia recommending this, [facility] arrived to transport pt to [facility] as pt had been accepted at 0627 this AM per [facility] ED SW note; as pt has not been evaluated by [facility] ED Alert or psychiatry team, or [facility] ERP this AM, and discussion with YANDEL Gomez from [facility], pt will cont with transfer to [facility] this AM with recommended f/u at [facility] outpt MH    Event Note

## 2019-07-10 PROCEDURE — 86900 BLOOD TYPING SEROLOGIC ABO: CPT

## 2019-07-10 PROCEDURE — 82962 GLUCOSE BLOOD TEST: CPT

## 2019-07-10 PROCEDURE — 86921 COMPATIBILITY TEST INCUBATE: CPT

## 2019-07-10 PROCEDURE — 99214 OFFICE O/P EST MOD 30 MIN: CPT

## 2019-07-10 PROCEDURE — 83036 HEMOGLOBIN GLYCOSYLATED A1C: CPT

## 2019-07-10 PROCEDURE — 88307 TISSUE EXAM BY PATHOLOGIST: CPT

## 2019-07-10 PROCEDURE — 82570 ASSAY OF URINE CREATININE: CPT

## 2019-07-10 PROCEDURE — 88302 TISSUE EXAM BY PATHOLOGIST: CPT

## 2019-07-10 PROCEDURE — 86780 TREPONEMA PALLIDUM: CPT

## 2019-07-10 PROCEDURE — 36415 COLL VENOUS BLD VENIPUNCTURE: CPT

## 2019-07-10 PROCEDURE — 74019 RADEX ABDOMEN 2 VIEWS: CPT

## 2019-07-10 PROCEDURE — 85025 COMPLETE CBC W/AUTO DIFF WBC: CPT

## 2019-07-10 PROCEDURE — 85730 THROMBOPLASTIN TIME PARTIAL: CPT

## 2019-07-10 PROCEDURE — 83735 ASSAY OF MAGNESIUM: CPT

## 2019-07-10 PROCEDURE — 80053 COMPREHEN METABOLIC PANEL: CPT

## 2019-07-10 PROCEDURE — 86901 BLOOD TYPING SEROLOGIC RH(D): CPT

## 2019-07-10 PROCEDURE — 36430 TRANSFUSION BLD/BLD COMPNT: CPT

## 2019-07-10 PROCEDURE — 84100 ASSAY OF PHOSPHORUS: CPT

## 2019-07-10 PROCEDURE — 86870 RBC ANTIBODY IDENTIFICATION: CPT

## 2019-07-10 PROCEDURE — 83615 LACTATE (LD) (LDH) ENZYME: CPT

## 2019-07-10 PROCEDURE — 94640 AIRWAY INHALATION TREATMENT: CPT

## 2019-07-10 PROCEDURE — 85027 COMPLETE CBC AUTOMATED: CPT

## 2019-07-10 PROCEDURE — 86922 COMPATIBILITY TEST ANTIGLOB: CPT

## 2019-07-10 PROCEDURE — 84156 ASSAY OF PROTEIN URINE: CPT

## 2019-07-10 PROCEDURE — P9016: CPT

## 2019-07-10 PROCEDURE — 81001 URINALYSIS AUTO W/SCOPE: CPT

## 2019-07-10 PROCEDURE — 90715 TDAP VACCINE 7 YRS/> IM: CPT

## 2019-07-10 PROCEDURE — 86880 COOMBS TEST DIRECT: CPT

## 2019-07-10 PROCEDURE — 86850 RBC ANTIBODY SCREEN: CPT

## 2019-07-10 PROCEDURE — 84550 ASSAY OF BLOOD/URIC ACID: CPT

## 2019-07-10 PROCEDURE — 85384 FIBRINOGEN ACTIVITY: CPT

## 2019-07-10 PROCEDURE — 85610 PROTHROMBIN TIME: CPT

## 2019-07-29 NOTE — ED PROVIDER NOTE - NS ED MD DISPO ADMIT [facility] PALLIATIVE CARE
Spoke with Jerardo. And advised MD's message   She still has questions  She says that when patient was in to see pcp on 7/22 that she asked if the test in clinic were comparable to test done at [facility] and pcp said yes.    So even though they are different test, since pcp said they are comparable she is wondering what the differences would mean in correlation with the scores?  She is wondering if it is normal for there to be such a dramatic change in the scores?     NONE

## 2019-10-14 NOTE — ED ADULT TRIAGE NOTE - NS ED TRIAGE HISTORIAN
Patient skin normal color for race, warm, dry and intact. skin normal color for race, warm, dry and intact. +1cm area of erythema and scaling behind right ear

## 2020-01-17 NOTE — DIETITIAN INITIAL EVALUATION ADULT. - FEEDING SKILL
[FreeTextEntry3] : I, Wiliam Chao, Lowell Rowley MD 
independent

## 2020-01-30 NOTE — PATIENT PROFILE OB - REASON FOR REFUSAL
Occupational Therapy   Evaluation and Discharge Note    Name: Paresh Senior  MRN: [id number]  Admitting Diagnosis:  Hypoglycemia      Recommendations:     Discharge Recommendations: home  Discharge Equipment Recommendations:  none  Barriers to discharge:  None    Assessment:     Paresh Senior is a 64 y.o. male with a medical diagnosis of Hypoglycemia. At this time, patient is functioning at their prior level of function and does not require further acute OT services.     Plan:     During this hospitalization, patient does not require further acute OT services.  Please re-consult if situation changes.    · Plan of Care Reviewed with: patient    Subjective     Chief Complaint: none  Patient/Family Comments/goals: to go home    Occupational Profile:  Living Environment: Patient lives alone in a 1st floor apartment with 3 JOSÉ to enter and B HRs (patient is able to reach them at the same time). Patient has a tub shower combo with grab bars and shower chair. Patient has a regular toilet with no grab bars. Patient uses his rollator outside in the community. Patient does not drive or work.     Pain/Comfort:  · Pain Rating 1: 0/10  · Pain Rating Post-Intervention 1: 0/10    Patients cultural, spiritual, Congregation conflicts given the current situation: no    Objective:     Communicated with: NSLUCIANO prior to session.  Patient found up in chair upon OT entry to room.    General Precautions: Standard, fall   Orthopedic Precautions:N/A   Braces: N/A     Occupational Performance:    Bed Mobility:    · Not tested     Functional Mobility/Transfers:  · Patient completed Sit <> Stand Transfer with modified independence  with  no assistive device   · Patient completed Toilet Transfer bedside chair<>toilet with functional ambulation technique with modified independence with  no AD    Activities of Daily Living:  · Grooming: modified independence standing at sink to wash/dry hands  · Upper Body Dressing: modified independence Donning back  gown  · Lower Body Dressing: independence [place] and donning socks  · Toileting: modified independence per patient report during toilet transfer. However, patient has a dillon catheter at this time (patient would be able to pull down and pull up clothing and clean self if needed).    Cognitive/Visual Perceptual:  Cognitive/Psychosocial Skills:     -       Oriented to: Person, Place, Time and Situation   -       Follows Commands/attention:Follows multistep  commands  -       Communication: clear/fluent  -       Memory: No Deficits noted  -       Safety awareness/insight to disability: intact   -       Mood/Affect/Coping skills/emotional control: Appropriate to situation  Visual/Perceptual:      -Intact      Physical Exam:  Upper Extremity Range of Motion:     -       Right Upper Extremity: WFL  -       Left Upper Extremity: WFL  Upper Extremity Strength:    -       Right Upper Extremity: WFL  -       Left Upper Extremity: WFL   Strength:    -       Right Upper Extremity: WFL  -       Left Upper Extremity: WFL  Fine Motor Coordination:    -       Intact  Gross motor coordination:   WFL    AMPAC 6 Click ADL:  AMPAC Total Score: 24    Treatment & Education:  Role of OT and POC  Safety  ADL retraining  Functional mobility training  Discharge planning  Education:    Patient left up in chair with call button in reach and all needs met.     GOALS:   Multidisciplinary Problems     Occupational Therapy Goals     Not on file          Multidisciplinary Problems (Resolved)        Problem: Occupational Therapy Goal    Goal Priority Disciplines Outcome Interventions   Occupational Therapy Goal   (Resolved)     OT, PT/OT Met                    History:     Past Medical History:   Diagnosis Date    COPD (chronic obstructive pulmonary disease)     Coronary artery disease     Hypercholesterolemia     Hypertension        Past Surgical History:   Procedure Laterality Date    COLONOSCOPY  02/2016    Advised repeat in 3 years     CORONARY STENT PLACEMENT  2013       Time Tracking:     OT Date of Treatment: 01/30/20  OT Start Time: 0926  OT Stop Time: 0937  OT Total Time (min): 11 min    Billable Minutes:Evaluation 11    JAMES Vogt  1/30/2020     preference

## 2020-02-25 NOTE — ED ADULT NURSE NOTE - BREATHING, MLM
Voice message requesting refill of Hydrocortisone Cream and Zolpidem 10mg  Checked PDMP, last Zolpidem refilled 1/29/20 #30  Spontaneous, unlabored and symmetrical

## 2020-03-21 ENCOUNTER — INPATIENT (INPATIENT)
Facility: HOSPITAL | Age: 40
LOS: 1 days | Discharge: HOME | DRG: 758 | End: 2020-03-23
Attending: INTERNAL MEDICINE | Admitting: HOSPITALIST
Payer: MEDICAID

## 2020-03-21 VITALS
WEIGHT: 293 LBS | SYSTOLIC BLOOD PRESSURE: 184 MMHG | TEMPERATURE: 98 F | HEIGHT: 67 IN | OXYGEN SATURATION: 98 % | DIASTOLIC BLOOD PRESSURE: 133 MMHG | HEART RATE: 114 BPM | RESPIRATION RATE: 18 BRPM

## 2020-03-21 DIAGNOSIS — N76.0 ACUTE VAGINITIS: ICD-10-CM

## 2020-03-21 DIAGNOSIS — Z98.89 OTHER SPECIFIED POSTPROCEDURAL STATES: Chronic | ICD-10-CM

## 2020-03-21 DIAGNOSIS — Z98.890 OTHER SPECIFIED POSTPROCEDURAL STATES: Chronic | ICD-10-CM

## 2020-03-21 DIAGNOSIS — E11.9 TYPE 2 DIABETES MELLITUS WITHOUT COMPLICATIONS: ICD-10-CM

## 2020-03-21 DIAGNOSIS — I10 ESSENTIAL (PRIMARY) HYPERTENSION: ICD-10-CM

## 2020-03-21 DIAGNOSIS — R63.8 OTHER SYMPTOMS AND SIGNS CONCERNING FOOD AND FLUID INTAKE: ICD-10-CM

## 2020-03-21 DIAGNOSIS — N76.2 ACUTE VULVITIS: ICD-10-CM

## 2020-03-21 PROBLEM — O14.90 UNSPECIFIED PRE-ECLAMPSIA, UNSPECIFIED TRIMESTER: Chronic | Status: ACTIVE | Noted: 2019-05-31

## 2020-03-21 LAB
ALBUMIN SERPL ELPH-MCNC: 3.5 G/DL — SIGNIFICANT CHANGE UP (ref 3.3–5)
ALP SERPL-CCNC: 164 U/L — HIGH (ref 40–120)
ALT FLD-CCNC: 43 U/L — SIGNIFICANT CHANGE UP (ref 10–45)
ANION GAP SERPL CALC-SCNC: 16 MMOL/L — SIGNIFICANT CHANGE UP (ref 5–17)
APPEARANCE UR: CLEAR — SIGNIFICANT CHANGE UP
APTT BLD: 28.5 SEC — SIGNIFICANT CHANGE UP (ref 27.5–36.3)
AST SERPL-CCNC: 50 U/L — HIGH (ref 10–40)
B-OH-BUTYR SERPL-SCNC: 0.2 MMOL/L — SIGNIFICANT CHANGE UP
BACTERIA # UR AUTO: PRESENT /HPF
BASE EXCESS BLDV CALC-SCNC: -1.6 MMOL/L — SIGNIFICANT CHANGE UP
BASOPHILS # BLD AUTO: 0.03 K/UL — SIGNIFICANT CHANGE UP (ref 0–0.2)
BASOPHILS NFR BLD AUTO: 0.3 % — SIGNIFICANT CHANGE UP (ref 0–2)
BILIRUB SERPL-MCNC: 0.2 MG/DL — SIGNIFICANT CHANGE UP (ref 0.2–1.2)
BILIRUB UR-MCNC: NEGATIVE — SIGNIFICANT CHANGE UP
BUN SERPL-MCNC: 9 MG/DL — SIGNIFICANT CHANGE UP (ref 7–23)
CALCIUM SERPL-MCNC: 8.9 MG/DL — SIGNIFICANT CHANGE UP (ref 8.4–10.5)
CHLORIDE SERPL-SCNC: 95 MMOL/L — LOW (ref 96–108)
CO2 SERPL-SCNC: 22 MMOL/L — SIGNIFICANT CHANGE UP (ref 22–31)
COLOR SPEC: YELLOW — SIGNIFICANT CHANGE UP
COMMENT - URINE: SIGNIFICANT CHANGE UP
CREAT SERPL-MCNC: 0.57 MG/DL — SIGNIFICANT CHANGE UP (ref 0.5–1.3)
DIFF PNL FLD: ABNORMAL
EOSINOPHIL # BLD AUTO: 0.09 K/UL — SIGNIFICANT CHANGE UP (ref 0–0.5)
EOSINOPHIL NFR BLD AUTO: 0.9 % — SIGNIFICANT CHANGE UP (ref 0–6)
EPI CELLS # UR: ABNORMAL /HPF (ref 0–5)
GLUCOSE SERPL-MCNC: 491 MG/DL — CRITICAL HIGH (ref 70–99)
GLUCOSE UR QL: >=1000
HCO3 BLDV-SCNC: 23 MMOL/L — SIGNIFICANT CHANGE UP (ref 20–27)
HCT VFR BLD CALC: 43.1 % — SIGNIFICANT CHANGE UP (ref 34.5–45)
HGB BLD-MCNC: 13.8 G/DL — SIGNIFICANT CHANGE UP (ref 11.5–15.5)
IMM GRANULOCYTES NFR BLD AUTO: 0.5 % — SIGNIFICANT CHANGE UP (ref 0–1.5)
INR BLD: 1.09 — SIGNIFICANT CHANGE UP (ref 0.88–1.16)
KETONES UR-MCNC: NEGATIVE — SIGNIFICANT CHANGE UP
LEUKOCYTE ESTERASE UR-ACNC: ABNORMAL
LYMPHOCYTES # BLD AUTO: 2.5 K/UL — SIGNIFICANT CHANGE UP (ref 1–3.3)
LYMPHOCYTES # BLD AUTO: 24.3 % — SIGNIFICANT CHANGE UP (ref 13–44)
MCHC RBC-ENTMCNC: 25.5 PG — LOW (ref 27–34)
MCHC RBC-ENTMCNC: 32 GM/DL — SIGNIFICANT CHANGE UP (ref 32–36)
MCV RBC AUTO: 79.7 FL — LOW (ref 80–100)
MONOCYTES # BLD AUTO: 0.67 K/UL — SIGNIFICANT CHANGE UP (ref 0–0.9)
MONOCYTES NFR BLD AUTO: 6.5 % — SIGNIFICANT CHANGE UP (ref 2–14)
NEUTROPHILS # BLD AUTO: 6.94 K/UL — SIGNIFICANT CHANGE UP (ref 1.8–7.4)
NEUTROPHILS NFR BLD AUTO: 67.5 % — SIGNIFICANT CHANGE UP (ref 43–77)
NITRITE UR-MCNC: NEGATIVE — SIGNIFICANT CHANGE UP
NRBC # BLD: 0 /100 WBCS — SIGNIFICANT CHANGE UP (ref 0–0)
PCO2 BLDV: 38 MMHG — LOW (ref 41–51)
PH BLDV: 7.4 — SIGNIFICANT CHANGE UP (ref 7.32–7.43)
PH UR: 6 — SIGNIFICANT CHANGE UP (ref 5–8)
PLATELET # BLD AUTO: 328 K/UL — SIGNIFICANT CHANGE UP (ref 150–400)
PO2 BLDV: 56 MMHG — SIGNIFICANT CHANGE UP
POTASSIUM SERPL-MCNC: 4 MMOL/L — SIGNIFICANT CHANGE UP (ref 3.5–5.3)
POTASSIUM SERPL-SCNC: 4 MMOL/L — SIGNIFICANT CHANGE UP (ref 3.5–5.3)
PROT SERPL-MCNC: 7.2 G/DL — SIGNIFICANT CHANGE UP (ref 6–8.3)
PROT UR-MCNC: NEGATIVE MG/DL — SIGNIFICANT CHANGE UP
PROTHROM AB SERPL-ACNC: 12.5 SEC — SIGNIFICANT CHANGE UP (ref 10–12.9)
RBC # BLD: 5.41 M/UL — HIGH (ref 3.8–5.2)
RBC # FLD: 15.3 % — HIGH (ref 10.3–14.5)
RBC CASTS # UR COMP ASSIST: < 5 /HPF — SIGNIFICANT CHANGE UP
SAO2 % BLDV: 89 % — SIGNIFICANT CHANGE UP
SODIUM SERPL-SCNC: 133 MMOL/L — LOW (ref 135–145)
SP GR SPEC: 1.01 — SIGNIFICANT CHANGE UP (ref 1–1.03)
UROBILINOGEN FLD QL: 0.2 E.U./DL — SIGNIFICANT CHANGE UP
WBC # BLD: 10.28 K/UL — SIGNIFICANT CHANGE UP (ref 3.8–10.5)
WBC # FLD AUTO: 10.28 K/UL — SIGNIFICANT CHANGE UP (ref 3.8–10.5)
WBC UR QL: ABNORMAL /HPF

## 2020-03-21 PROCEDURE — 99223 1ST HOSP IP/OBS HIGH 75: CPT | Mod: GC

## 2020-03-21 PROCEDURE — 99285 EMERGENCY DEPT VISIT HI MDM: CPT

## 2020-03-21 RX ORDER — INSULIN GLARGINE 100 [IU]/ML
10 INJECTION, SOLUTION SUBCUTANEOUS AT BEDTIME
Refills: 0 | Status: DISCONTINUED | OUTPATIENT
Start: 2020-03-21 | End: 2020-03-22

## 2020-03-21 RX ORDER — OXYCODONE AND ACETAMINOPHEN 5; 325 MG/1; MG/1
1 TABLET ORAL ONCE
Refills: 0 | Status: DISCONTINUED | OUTPATIENT
Start: 2020-03-21 | End: 2020-03-21

## 2020-03-21 RX ORDER — SODIUM CHLORIDE 9 MG/ML
1000 INJECTION INTRAMUSCULAR; INTRAVENOUS; SUBCUTANEOUS ONCE
Refills: 0 | Status: COMPLETED | OUTPATIENT
Start: 2020-03-21 | End: 2020-03-21

## 2020-03-21 RX ORDER — DEXTROSE 50 % IN WATER 50 %
12.5 SYRINGE (ML) INTRAVENOUS ONCE
Refills: 0 | Status: DISCONTINUED | OUTPATIENT
Start: 2020-03-21 | End: 2020-03-23

## 2020-03-21 RX ORDER — LABETALOL HCL 100 MG
300 TABLET ORAL EVERY 8 HOURS
Refills: 0 | Status: DISCONTINUED | OUTPATIENT
Start: 2020-03-21 | End: 2020-03-23

## 2020-03-21 RX ORDER — GLUCAGON INJECTION, SOLUTION 0.5 MG/.1ML
1 INJECTION, SOLUTION SUBCUTANEOUS ONCE
Refills: 0 | Status: DISCONTINUED | OUTPATIENT
Start: 2020-03-21 | End: 2020-03-23

## 2020-03-21 RX ORDER — INSULIN LISPRO 100/ML
3 VIAL (ML) SUBCUTANEOUS
Refills: 0 | Status: DISCONTINUED | OUTPATIENT
Start: 2020-03-21 | End: 2020-03-22

## 2020-03-21 RX ORDER — SODIUM CHLORIDE 9 MG/ML
1000 INJECTION, SOLUTION INTRAVENOUS
Refills: 0 | Status: DISCONTINUED | OUTPATIENT
Start: 2020-03-21 | End: 2020-03-23

## 2020-03-21 RX ORDER — DEXTROSE 50 % IN WATER 50 %
25 SYRINGE (ML) INTRAVENOUS ONCE
Refills: 0 | Status: DISCONTINUED | OUTPATIENT
Start: 2020-03-21 | End: 2020-03-23

## 2020-03-21 RX ORDER — INSULIN LISPRO 100/ML
VIAL (ML) SUBCUTANEOUS
Refills: 0 | Status: DISCONTINUED | OUTPATIENT
Start: 2020-03-21 | End: 2020-03-23

## 2020-03-21 RX ORDER — OXYCODONE AND ACETAMINOPHEN 5; 325 MG/1; MG/1
1 TABLET ORAL EVERY 4 HOURS
Refills: 0 | Status: DISCONTINUED | OUTPATIENT
Start: 2020-03-21 | End: 2020-03-22

## 2020-03-21 RX ORDER — INSULIN HUMAN 100 [IU]/ML
8 INJECTION, SOLUTION SUBCUTANEOUS ONCE
Refills: 0 | Status: COMPLETED | OUTPATIENT
Start: 2020-03-21 | End: 2020-03-21

## 2020-03-21 RX ORDER — DEXTROSE 50 % IN WATER 50 %
15 SYRINGE (ML) INTRAVENOUS ONCE
Refills: 0 | Status: DISCONTINUED | OUTPATIENT
Start: 2020-03-21 | End: 2020-03-23

## 2020-03-21 RX ORDER — IBUPROFEN 200 MG
400 TABLET ORAL ONCE
Refills: 0 | Status: COMPLETED | OUTPATIENT
Start: 2020-03-21 | End: 2020-03-21

## 2020-03-21 RX ORDER — FLUCONAZOLE 150 MG/1
150 TABLET ORAL ONCE
Refills: 0 | Status: COMPLETED | OUTPATIENT
Start: 2020-03-21 | End: 2020-03-21

## 2020-03-21 RX ADMIN — OXYCODONE AND ACETAMINOPHEN 1 TABLET(S): 5; 325 TABLET ORAL at 19:18

## 2020-03-21 RX ADMIN — INSULIN GLARGINE 10 UNIT(S): 100 INJECTION, SOLUTION SUBCUTANEOUS at 22:44

## 2020-03-21 RX ADMIN — Medication 300 MILLIGRAM(S): at 22:44

## 2020-03-21 RX ADMIN — Medication 400 MILLIGRAM(S): at 22:32

## 2020-03-21 RX ADMIN — OXYCODONE AND ACETAMINOPHEN 1 TABLET(S): 5; 325 TABLET ORAL at 23:09

## 2020-03-21 RX ADMIN — OXYCODONE AND ACETAMINOPHEN 1 TABLET(S): 5; 325 TABLET ORAL at 17:06

## 2020-03-21 RX ADMIN — Medication 400 MILLIGRAM(S): at 21:32

## 2020-03-21 RX ADMIN — FLUCONAZOLE 150 MILLIGRAM(S): 150 TABLET ORAL at 16:39

## 2020-03-21 RX ADMIN — Medication 100 MILLIGRAM(S): at 16:06

## 2020-03-21 RX ADMIN — Medication 8: at 21:58

## 2020-03-21 RX ADMIN — SODIUM CHLORIDE 1000 MILLILITER(S): 9 INJECTION INTRAMUSCULAR; INTRAVENOUS; SUBCUTANEOUS at 16:07

## 2020-03-21 RX ADMIN — OXYCODONE AND ACETAMINOPHEN 1 TABLET(S): 5; 325 TABLET ORAL at 17:59

## 2020-03-21 RX ADMIN — SODIUM CHLORIDE 1000 MILLILITER(S): 9 INJECTION INTRAMUSCULAR; INTRAVENOUS; SUBCUTANEOUS at 17:55

## 2020-03-21 RX ADMIN — OXYCODONE AND ACETAMINOPHEN 1 TABLET(S): 5; 325 TABLET ORAL at 17:55

## 2020-03-21 RX ADMIN — INSULIN HUMAN 8 UNIT(S): 100 INJECTION, SOLUTION SUBCUTANEOUS at 16:29

## 2020-03-21 NOTE — H&P ADULT - PROBLEM SELECTOR PLAN 1
Patient presenting with severe erythema, tenderness, and swelling of vulvar, vaginal, and perianal area, in the setting of uncontrolled diabetes. 1/4 SIRS criteria on admission (tachycardia, likely 2/2 pain). Suspect cellulitis of area  -As patient allergic to ampicillin and penicillin, and had reaction to Bactrim several weeks ago, continue with po clindamycin for now (450mg tid)  -OB gyn saw pt upstairs, took vaginal swab. F/u cultures  -Picture may be complicated by suspected underlying Hidradenitis Suppurativa, consider derm consult in am  -Pain control with prn percocet q4 hours Patient presenting with severe erythema, tenderness, and swelling of vulvar, vaginal, and perianal area, in the setting of uncontrolled diabetes. 1/4 SIRS criteria on admission (tachycardia, likely 2/2 pain). Suspect cellulitis of area  -As patient allergic to ampicillin and penicillin, and had reaction to Bactrim several weeks ago, continue with IV clindamycin for now (600mg q8)  -Given complicated gyn/surgery history, f/u CT A/P non-con (pt with iodine allergy)  -OB gyn saw pt upstairs, took vaginal swab. F/u cultures  -Picture may be complicated by suspected underlying Hidradenitis Suppurativa, consider derm consult in am  -Pain control with prn percocet q4 hours Patient presenting with severe erythema, tenderness, and swelling of vulvar, vaginal, and perianal area, in the setting of uncontrolled diabetes. 1/4 SIRS criteria on admission (tachycardia, likely 2/2 pain). Suspect cellulitis of area  -As patient allergic to ampicillin and penicillin, and had reaction to Bactrim several weeks ago, continue with IV clindamycin for now (600mg q8)  -Given complicated gyn/surgery history, f/u CT A/P non-con (pt with iodine allergy). If not concerning, consider switching to po clindamycin  -OB gyn saw pt in ED, took vaginal swab. F/u cultures  -Picture may be complicated by suspected underlying Hidradenitis Suppurativa, derm consult in am  -Pain control with prn percocet q4 hours, increase if necessary Patient presenting with severe erythema, tenderness, and swelling of vulvar, vaginal, and perianal area, in the setting of uncontrolled diabetes. 1/4 SIRS criteria on admission (tachycardia, likely 2/2 pain). Suspect cellulitis of area  -As patient allergic to ampicillin and penicillin, and had reaction to Bactrim several weeks ago, continue with IV clindamycin for now (600mg q8)  -Given complicated gyn/surgery history, f/u CT A/P non-con (pt with iodine allergy). If not concerning, consider switching to po clindamycin  -OB gyn saw pt in ED, took vaginal swab. F/u cultures  -Picture may be complicated by suspected underlying Hidradenitis Suppurativa, derm consult in am  -Pain control with prn percocet q4 hours, increase if necessary    ADDENDUM: erythematous rash is superficial localilzed to vulvar region, exending to perineal / perianal area. ddx includes yeast vs side effect of drug  vs superimposed cellulitis, followup CT a/p (low suspicion for abscess , however given extent of rash , r/o nec fas, given hyperglycemia) Patient presenting with severe erythema, tenderness, and swelling of vulvar, vaginal, and perianal area, in the setting of uncontrolled diabetes. 1/4 SIRS criteria on admission (tachycardia, likely 2/2 pain). Suspect cellulitis of area  -As patient allergic to ampicillin and penicillin, and had reaction to Bactrim several weeks ago, continue with IV clindamycin for now (600mg q8)  -Given complicated gyn/surgery history, f/u CT A/P non-con (pt with iodine allergy). If not concerning, consider switching to po clindamycin  -OB gyn saw pt in ED, took vaginal swab. F/u cultures  -Picture may be complicated by suspected underlying Hidradenitis Suppurativa, derm consult in am  -Pain control with prn percocet q4 hours, increase if necessary    ADDENDUM: erythematous rash is superficial localilzed to vulvar region, exending to perineal / perianal area. ddx includes yeast vs side effect of drug  vs superimposed cellulitis, followup CT a/p (low suspicion for abscess , however given extent of rash and hyperglycemia will obtain)    ADDENDUM #2: pt reported severe itching after finishing 2nd dose of clindamycin, no hives/ no airway compromise; hold off on further abx for now as sxs could likely be from severe fungal infection given itching sxs; consult ID in AM further evaluation; ordered for benadryl prn; alternative abx if need to use o/n: doxycycline.

## 2020-03-21 NOTE — ED PROVIDER NOTE - OBJECTIVE STATEMENT
39 yo F  with PMH of uncontrolled DM2 and HTN hx of  19 vulvar wound infection requiring IR drainage presenting to ED for 1 week of worsening vaginal and perianal pain. Patient reports difficulty lying down, stats she cannot get comfortable  Patient reports being prescribed bactrim by an outpatient provider a few weeks back but is inconsistent with whether she took everything or not. Patient is agitated and uncomfortable. She reports extreme dysuria and discomfort when she voids, she is also reporting vaginal irritation and white discharge

## 2020-03-21 NOTE — H&P ADULT - PROBLEM SELECTOR PLAN 6
Pt with self-reported history of DVT, though was never on any AC. No calf tenderness or unilateral swelling on exam  -collateral from old PCP (pt reports she no longer sees this PCP as she was told she is "too sick")  -If unable to get collateral, consider lower extremity duplex Pt with self-reported history of DVT, though was never on any AC. No calf tenderness or unilateral swelling on exam  -F/u lower extremity duplex Patient with history of type 2 Diabetes, does not recall insulin regimen  -Endo saw pt in May 2019, recommended mISS but at that point patient's glucose appeared to be better controlled   -For now, weight based insulin regimen (10 units lantus at night, 3 units lispro TID) and mISS  -Endo consult in am (know patient)

## 2020-03-21 NOTE — H&P ADULT - PROBLEM SELECTOR PLAN 8
F: None  E: Replete for K<4 and Mag <2  N: Diet Carb Consistent, DASH/TLC  A: Lovenox   Dispo: [facility]  Code: FULL CODE

## 2020-03-21 NOTE — H&P ADULT - PROBLEM SELECTOR PLAN 5
Patient with history of type 2 Diabetes, does not recall insulin regimen  -med rec in am  -For now, will continue with mISS as was recommended by endo last time they saw pt in May 2019 Patient with history of type 2 Diabetes, does not recall insulin regimen  -Endo saw pt in May 2019, recommended mISS but at that point patient's glucose appeared to be better controlled   -For now, weight based insulin regimen (10 units lantus at night, 3 units lispro TID) and mISS  -Endo consult in am (know patient) Patient reports poorly controlled htn, worsened to 170s-180s over the last 3 weeks after she ran out of her labetalol  -Restart labetalol 300mg tid   -Continue to monitor bps and adjust regimen as needed  -Ensure pt has PCP follow up on discharge or has sufficient refills

## 2020-03-21 NOTE — H&P ADULT - PROBLEM SELECTOR PLAN 2
Seen by ob gyn in ED who suspect vaginitis is 2/2 yeast infection  -s/p one dose of fluconazole, recommending second dose in one week if symptoms persist  -F/u vaginal swab cultures that were sent Seen by ob gyn in ED who suspect vaginitis is 2/2 yeast infection  -s/p one dose of fluconazole, recommending second dose in one week if symptoms persist  -F/u vaginal swab cultures that were sent  -F/u gyn recs Seen by ob gyn in ED who suspect vaginitis is 2/2 yeast infection  -s/p one dose of fluconazole, recommending second dose in one week if symptoms persist  -F/u vaginal swab cultures that were sent  -F/u gyn recs    ADDENDUM: added 1% clindamycin topical to apply to outer vaginal region

## 2020-03-21 NOTE — H&P ADULT - NSICDXPASTMEDICALHX_GEN_ALL_CORE_FT
PAST MEDICAL HISTORY:  Abdominal hernia     Diabetes     Essential hypertension     Hyperlipidemia     Obesity     Pre-eclampsia

## 2020-03-21 NOTE — H&P ADULT - PROBLEM SELECTOR PLAN 3
Patient with boils and sinus tracts in axilla and groin, suspect HS  -Consider derm consult in am  -Pain control with prn percocet Patient with boils and sinus tracts in axilla and groin, suspect underlying HS  -Derm consult in am  -Pain control with prn percocet see above #1

## 2020-03-21 NOTE — H&P ADULT - NSHPPHYSICALEXAM_GEN_ALL_CORE
.  VITAL SIGNS:  T(C): 36.7 (03-21-20 @ 18:18), Max: 36.7 (03-21-20 @ 14:49)  T(F): 98 (03-21-20 @ 18:18), Max: 98 (03-21-20 @ 14:49)  HR: 104 (03-21-20 @ 18:18) (104 - 114)  BP: 174/106 (03-21-20 @ 18:18) (174/106 - 184/133)  BP(mean): --  RR: 18 (03-21-20 @ 18:18) (18 - 18)  SpO2: 92% (03-21-20 @ 18:18) (92% - 99%)  Wt(kg): --    PHYSICAL EXAM:    Constitutional: Obese female tearfully sitting in bed, appears to be in severe discomfort  Head: NC/AT  Eyes: PERRL, EOMI, anicteric sclera  ENT: no nasal discharge; uvula midline, no oropharyngeal erythema or exudates; MMM  Neck: supple; no JVD or thyromegaly  Respiratory: CTA B/L; no W/R/R, no retractions  Cardiac: +S1/S2; tachycardic but regular; no M/R/G; PMI non-displaced  Gastrointestinal: abdomen soft, NT/ND; no rebound or guarding; +BSx4  Back: spine midline, no bony tenderness or step-offs; no CVAT B/L  : Significant erythema overlying vulvar, vaginal, and perianal areas with severe swelling of outer vulva, superficial erosion seen in vagina as well with no obvious discharge.   Extremities: WWP, no clubbing or cyanosis; no peripheral edema. Bilateraly axilla with some erythematous nodules, deep nodules, and sinus tracts  Vascular: 2+ radial, femoral, DP/PT pulses B/L  Lymphatic: no submandibular or cervical LAD  Neurologic: AAOx3; CNII-XII grossly intact; no focal deficits  Psychiatric: affect and characteristics of appearance, verbalizations, behaviors are appropriate

## 2020-03-21 NOTE — H&P ADULT - ATTENDING COMMENTS
patient seen and examined overnight  ; chaperoned by Faith Berg RN; reviewed pertinent data, h&p  a/f gu/ perineal/perianal rash, occuring after taking bactrim ds; described as burning sensation w/ itching. pt was given diflucan as outpt and also here in ED. Seen by GYN , vaginitis lab pending. pt obese, anxious, NAD, s1s2, lungs cta b/l; obese , nontender abdomen; +erythematous rash at vulva, perineal and perianal region, +hemorrhoids on visual inspection; +1 nonpitting lower ext edema b/l, w/ varicose vv present. +old scar tissue at left axilla, +tender erythematous nodule at the right axilla and left axilla, likely HS.     1. ?cellulitis superimposed on / perineal / perianal rash, from drug side effect vs fungal infection. s/p PO diflucan, currently on IV clindamycin. followup CT a/p, control glucose, pain control. check GC/ chlamydia, HIV test (if agreeable). will add antipruritic agent and topical agent for rash (avoid steroids in light of hyperglycemia)     rest of plan as above patient seen and examined overnight  ; chaperoned by Faith Berg RN; reviewed pertinent data, h&p  a/f gu/ perineal/perianal rash, occuring after taking bactrim ds; described as burning sensation w/ itching. pt was given diflucan as outpt and also here in ED. Seen by GYN , vaginitis lab pending. pt obese, anxious, NAD, s1s2, lungs cta b/l; obese , nontender abdomen; +erythematous rash at vulva, perineal and perianal region, +hemorrhoids on visual inspection; +1 nonpitting lower ext edema b/l, w/ varicose vv present. +old scar tissue at left axilla, +tender erythematous nodule at the right axilla and left axilla, likely HS.     1. ?cellulitis superimposed on / perineal / perianal rash, from drug side effect vs fungal infection. s/p PO diflucan, currently on IV clindamycin. followup CT a/p, control glucose, pain control. check GC/ chlamydia, HIV test (if agreeable). will add antipruritic agent (benadryl) and topical agent for rash (clotrimazoe - avoid steroids in light of hyperglycemia)       rest of plan as above

## 2020-03-21 NOTE — ED ADULT NURSE NOTE - OBJECTIVE STATEMENT
39 yo F pmhx T2D, HTN, hernia repair sx presents to ED co tooth pain, vaginal/ rectal pain x1 week, FS upon arrival 441. Pt ambulates with steady gait from front triage speaking in clear and coherent sentences denying polyuria, polyphasia, polydipsia, hematuria, blood in stool, fevers, chills, body aches, sick contacts/ recent travel. Pt states, " I have a tooth infection and they gave me an ABX that I was allergic too and now I have horrible pain in my vagina and it hurts when I wipe my butt, im always nauseas." describes 6/10 pain unrelieved by, "at least 6 or 8 pills of motrin every day." Upon assessment oral mucosa is pink, moist, and intact, abdomen is nondistended and nontender. 39 yo F pmhx T2D, HTN, hernia repair sx presents to ED co tooth pain, vaginal/ rectal pain x1 week, FS upon arrival 441. Pt ambulates with steady gait from front triage speaking in clear and coherent sentences denying polyuria, polyphasia, polydipsia, hematuria, blood in stool, fevers, chills, body aches, sick contacts/ recent travel. Pt states, " I have a tooth infection and they gave me an ABX that I was allergic too and now I have horrible pain in my vagina and it hurts when I wipe my butt, im always nauseas." describes 6/10 pain unrelieved by, "at least 6 or 8 pills of motrin every day." Upon assessment oral mucosa is pink, moist, and intact, abdomen is nondistended and nontender, redness noted to vaginal/ rectal area. Pt denies headache, dizziness, blurred vision, states, "my pressure is always high when im in pain." 41 yo F pmhx T2D, HTN, hernia repair sx presents to ED co tooth pain, vaginal/ rectal pain x1 week, FS upon arrival 441. Pt ambulates with steady gait from front triage speaking in clear and coherent sentences denying polyuria, polyphasia, polydipsia, hematuria, blood in stool, fevers, chills, body aches, sick contacts/ recent travel. Pt states, " I have a tooth infection and they gave me an ABX that I was allergic too and now I have horrible pain in my vagina and it hurts when I wipe my butt, im always nauseas." describes 6/10 pain unrelieved by, "at least 6 or 8 pills of motrin every day." Upon assessment oral mucosa is pink, moist, and intact, abdomen is nondistended and nontender, redness noted to vaginal/ rectal area. Pt denies headache, dizziness, blurred vision, states, "my pressure is always high when im in pain" also denies use of BP meds for multiple months.

## 2020-03-21 NOTE — ED ADULT TRIAGE NOTE - ARRIVAL INFO ADDITIONAL COMMENTS
c.o elevated blood pressure, glucose and vaginal discomfort for several days. c.o intermittent chest pain for 3 days. fs in triage 441 mg/dl.

## 2020-03-21 NOTE — ED ADULT NURSE NOTE - NSIMPLEMENTINTERV_GEN_ALL_ED
Implemented All Universal Safety Interventions:  [place] to call system. Call bell, personal items and telephone within reach. Instruct patient to call for assistance. Room bathroom lighting operational. Non-slip footwear when patient is off stretcher. Physically safe environment: no spills, clutter or unnecessary equipment. Stretcher in lowest position, wheels locked, appropriate side rails in place.

## 2020-03-21 NOTE — H&P ADULT - NSICDXFAMILYHX_GEN_ALL_CORE_FT
FAMILY HISTORY:  No pertinent family history in first degree relatives FAMILY HISTORY:  FH: diabetes mellitus, father and mother  FH: hypertension, father and mother

## 2020-03-21 NOTE — H&P ADULT - NSHPLABSRESULTS_GEN_ALL_CORE
.  LABS:                         13.8   10.28 )-----------( 328      ( 21 Mar 2020 15:30 )             43.1     03-    133<L>  |  95<L>  |  9   ----------------------------<  491<HH>  4.0   |  22  |  0.57    Ca    8.9      21 Mar 2020 15:30    TPro  7.2  /  Alb  3.5  /  TBili  0.2  /  DBili  x   /  AST  50<H>  /  ALT  43  /  AlkPhos  164<H>  03-21    PT/INR - ( 21 Mar 2020 15:30 )   PT: 12.5 sec;   INR: 1.09          PTT - ( 21 Mar 2020 15:30 )  PTT:28.5 sec  Urinalysis Basic - ( 21 Mar 2020 15:33 )    Color: Yellow / Appearance: Clear / S.010 / pH: x  Gluc: x / Ketone: NEGATIVE  / Bili: Negative / Urobili: 0.2 E.U./dL   Blood: x / Protein: NEGATIVE mg/dL / Nitrite: NEGATIVE   Leuk Esterase: Trace / RBC: < 5 /HPF / WBC 5-10 /HPF   Sq Epi: x / Non Sq Epi: Moderate /HPF / Bacteria: Present /HPF                RADIOLOGY, EKG & ADDITIONAL TESTS: Reviewed.

## 2020-03-21 NOTE — ED PROVIDER NOTE - CARE PLAN
Principal Discharge DX:	Vulval cellulitis  Secondary Diagnosis:	Perianal cellulitis  Secondary Diagnosis:	Hyperglycemia  Secondary Diagnosis:	Diabetes

## 2020-03-21 NOTE — H&P ADULT - PROBLEM SELECTOR PLAN 4
Patient reports poorly controlled htn, worsened to 170s-180s over the last 3 weeks after she ran out of her labetalol  -Restart labetalol 300mg tid   -Continue to monitor bps and adjust regimen as needed  -Ensure pt has PCP follow up on discharge or has sufficient refills Patient with boils and sinus tracts in axilla and groin, suspect underlying HS  -Derm consult in am  -Pain control with prn percocet

## 2020-03-21 NOTE — H&P ADULT - PROBLEM SELECTOR PLAN 7
F: None  E: Replete for K<4 and Mag <2  N: Diet Carb Consistent, DASH/TLC  A: Lovenox   Dispo: [facility]  Code: FULL CODE Pt with self-reported history of DVT, though was never on any AC. No calf tenderness or unilateral swelling on exam  -F/u lower extremity duplex

## 2020-03-21 NOTE — H&P ADULT - HISTORY OF PRESENT ILLNESS
Patient is a 39 yo  with PMH of uncontrolled DM2 and HTN as well as a post operative wound infection requiring IR drainage and several abdominal washout of the wound site with surgery team presenting for several weeks of worsening vaginal pain, redness, and swelling. Patient states that her symptoms began approximately one month ago, after her dentist prescribed her Bactrim for an anticipated dental procedure. She states that two days after taking the Bactrim, she noticed diffuse pruritus over her body and extreme pruritus and pain over her vulva. This continued to worsen and an outpatient provider prescribed her a dose of fluconazole, which alleviated her pain for two days prior to it returning. Over the last 3 days, patient reporting 10/10 intolerable pain and severe swelling. Also reports severe dysuria. Reports that she has had several similar episodes in the past treated with antibiotics, but they were never this severe. She also states that during these episodes, she always gets "boils" in her axillary and groind regions. Of note, patient endorses taking her insulin regularly but she has run out of her labetalol three weeks ago, and her blood pressure has been in the 170s-180s at home. Patient denies fevers/chills, chest pain, sob, palpitations, abdominal pain, nausea/vomiting, diarrhea/constipation.     On arrival to ED, T 98.0 F  /133--> 176/88 without intervention, RR 18 satting at 98% on RA. Labs significant for Na 133, glucose 491, UA not suggestive of UTI but with glucose in urine. EKG sinus tach. Given 600mg IV clinda, 150mg oral fluconazole, 8 units regular insulin, percocet x2, 2L NS. Patient is a 41 yo  with PMH of uncontrolled DM2 and HTN as well as a post operative wound infection requiring IR drainage and several abdominal washout of the wound site with surgery team presenting for several weeks of worsening vaginal pain, redness, and swelling. Patient states that her symptoms began approximately one month ago, after her dentist prescribed her Bactrim for an anticipated dental procedure. She states that two days after taking the Bactrim, she noticed diffuse pruritus over her body and extreme pruritus and pain over her vulva. This continued to worsen and an outpatient provider prescribed her a dose of fluconazole, which alleviated her pain for two days prior to it returning. Over the last 3 days, patient reporting 10/10 intolerable pain and severe swelling. Also reports severe dysuria. Reports that she has had several similar episodes in the past treated with antibiotics, but they were never this severe. She also states that during these episodes, she always gets "boils" in her axillary and groind regions. Of note, patient endorses taking her insulin regularly but she has run out of her labetalol three weeks ago, and her blood pressure has been in the 170s-180s at home. Patient denies fevers/chills, chest pain, sob, palpitations, abdominal pain, nausea/vomiting, diarrhea/constipation. Of note, patient also reports being diagnosed with a DVT in 2017 but not on any AC without reported increase in extremity swelling or tenderness.    On arrival to ED, T 98.0 F  /133--> 176/88 without intervention, RR 18 satting at 98% on RA. Labs significant for Na 133, glucose 491, UA not suggestive of UTI but with glucose in urine. EKG sinus tach. Given 600mg IV clinda, 150mg oral fluconazole, 8 units regular insulin, percocet x2, 2L NS. Patient is a 41 yo  with PMH of uncontrolled DM2 and HTN as well as a post operative wound infection following birth in  requiring IR drainage and several abdominal washout of wound site with surgery team following b/l hernia repair, presenting for several weeks of worsening vaginal pain, redness, and swelling. Patient states that her symptoms began approximately one month ago, after her dentist prescribed her Bactrim for an anticipated dental procedure. She states that two days after taking the Bactrim, she noticed diffuse pruritus over her body and extreme pruritus and pain over her vulva. This continued to worsen and an outpatient provider prescribed her a dose of fluconazole, which alleviated her pain for two days prior to it returning. Over the last 3 days, patient reporting 10/10 intolerable pain and severe swelling. Also reports severe dysuria. Reports that she has had several similar episodes in the past treated with antibiotics, but they were never this severe. She also states that during these episodes, she always gets "boils" in her axillary and groind regions. Of note, patient endorses taking her insulin regularly but she has run out of her labetalol three weeks ago, and her blood pressure has been in the 170s-180s at home. Patient denies fevers/chills, chest pain, sob, palpitations, abdominal pain, nausea/vomiting, diarrhea/constipation. Of note, patient also reports being diagnosed with a DVT in 2017 but not on any AC without reported increase in extremity swelling or tenderness.    On arrival to ED, T 98.0 F  /133--> 176/88 without intervention, RR 18 satting at 98% on RA. Labs significant for Na 133, glucose 491, UA not suggestive of UTI but with glucose in urine. EKG sinus tach. Given 600mg IV clinda, 150mg oral fluconazole, 8 units regular insulin, percocet x2, 2L NS.

## 2020-03-21 NOTE — H&P ADULT - ASSESSMENT
Patient is a 41 yo  with PMH of uncontrolled DM2 and HTN as well as a post operative wound infection requiring IR drainage and several abdominal washout of the wound site with surgery team presenting for several weeks of worsening vaginal pain, redness, and swelling, admitted for vaginal cellulitis.

## 2020-03-21 NOTE — CONSULT NOTE ADULT - ASSESSMENT
41 yo  with PMH of uncontrolled DM2 and cHTN presenting to ED for 1 week of worsening vaginal and perianal pain. In triage, patient noted to have severely elevated BP (patient reports at home they were as high as 200s/120s) as well as random . Per conversation with ED attending patient to be admitted to medicine for optimization of medical comorbidities and treatment of suspected cellulitis. GYN to defer to primary team management, will followup vaginitis panel, however as patient will presumably be started on antibiotics during hospital stay, patient can received 150mg Diflucan PO for suspected yeast infection, patient can receive additional dose in 7 days if symptoms persist     Patient d/w Dr. Mustafa in house

## 2020-03-21 NOTE — H&P ADULT - NSICDXPASTSURGICALHX_GEN_ALL_CORE_FT
PAST SURGICAL HISTORY:  H/O exploratory laparotomy     H/O LEEP     H/O ventral hernia repair     H/O:      History of D&C

## 2020-03-21 NOTE — ED ADULT NURSE NOTE - PMH
Abdominal hernia    Diabetes    Essential hypertension    Hyperlipidemia    Obesity    Pre-eclampsia

## 2020-03-22 DIAGNOSIS — R21 RASH AND OTHER NONSPECIFIC SKIN ERUPTION: ICD-10-CM

## 2020-03-22 LAB
ALBUMIN SERPL ELPH-MCNC: 3.1 G/DL — LOW (ref 3.3–5)
ALP SERPL-CCNC: 121 U/L — HIGH (ref 40–120)
ALT FLD-CCNC: 38 U/L — SIGNIFICANT CHANGE UP (ref 10–45)
ANION GAP SERPL CALC-SCNC: 11 MMOL/L — SIGNIFICANT CHANGE UP (ref 5–17)
AST SERPL-CCNC: 53 U/L — HIGH (ref 10–40)
BILIRUB SERPL-MCNC: 0.2 MG/DL — SIGNIFICANT CHANGE UP (ref 0.2–1.2)
BUN SERPL-MCNC: 8 MG/DL — SIGNIFICANT CHANGE UP (ref 7–23)
CALCIUM SERPL-MCNC: 8.1 MG/DL — LOW (ref 8.4–10.5)
CHLORIDE SERPL-SCNC: 102 MMOL/L — SIGNIFICANT CHANGE UP (ref 96–108)
CO2 SERPL-SCNC: 24 MMOL/L — SIGNIFICANT CHANGE UP (ref 22–31)
CREAT SERPL-MCNC: 0.65 MG/DL — SIGNIFICANT CHANGE UP (ref 0.5–1.3)
GLUCOSE SERPL-MCNC: 262 MG/DL — HIGH (ref 70–99)
HBA1C BLD-MCNC: 12 % — HIGH (ref 4–5.6)
HCG SERPL-ACNC: <0 MIU/ML — SIGNIFICANT CHANGE UP
HCT VFR BLD CALC: 39.4 % — SIGNIFICANT CHANGE UP (ref 34.5–45)
HGB BLD-MCNC: 12.4 G/DL — SIGNIFICANT CHANGE UP (ref 11.5–15.5)
MAGNESIUM SERPL-MCNC: 1.7 MG/DL — SIGNIFICANT CHANGE UP (ref 1.6–2.6)
MCHC RBC-ENTMCNC: 25.8 PG — LOW (ref 27–34)
MCHC RBC-ENTMCNC: 31.5 GM/DL — LOW (ref 32–36)
MCV RBC AUTO: 82.1 FL — SIGNIFICANT CHANGE UP (ref 80–100)
NRBC # BLD: 0 /100 WBCS — SIGNIFICANT CHANGE UP (ref 0–0)
PLATELET # BLD AUTO: 281 K/UL — SIGNIFICANT CHANGE UP (ref 150–400)
POTASSIUM SERPL-MCNC: 3.9 MMOL/L — SIGNIFICANT CHANGE UP (ref 3.5–5.3)
POTASSIUM SERPL-SCNC: 3.9 MMOL/L — SIGNIFICANT CHANGE UP (ref 3.5–5.3)
PROT SERPL-MCNC: 6.3 G/DL — SIGNIFICANT CHANGE UP (ref 6–8.3)
RBC # BLD: 4.8 M/UL — SIGNIFICANT CHANGE UP (ref 3.8–5.2)
RBC # FLD: 15.8 % — HIGH (ref 10.3–14.5)
SODIUM SERPL-SCNC: 137 MMOL/L — SIGNIFICANT CHANGE UP (ref 135–145)
WBC # BLD: 9.39 K/UL — SIGNIFICANT CHANGE UP (ref 3.8–10.5)
WBC # FLD AUTO: 9.39 K/UL — SIGNIFICANT CHANGE UP (ref 3.8–10.5)

## 2020-03-22 PROCEDURE — 99233 SBSQ HOSP IP/OBS HIGH 50: CPT | Mod: GC

## 2020-03-22 RX ORDER — MAGNESIUM SULFATE 500 MG/ML
2 VIAL (ML) INJECTION ONCE
Refills: 0 | Status: COMPLETED | OUTPATIENT
Start: 2020-03-22 | End: 2020-03-22

## 2020-03-22 RX ORDER — DIPHENHYDRAMINE HCL 50 MG
50 CAPSULE ORAL EVERY 4 HOURS
Refills: 0 | Status: DISCONTINUED | OUTPATIENT
Start: 2020-03-22 | End: 2020-03-23

## 2020-03-22 RX ORDER — OXYCODONE AND ACETAMINOPHEN 5; 325 MG/1; MG/1
1 TABLET ORAL EVERY 4 HOURS
Refills: 0 | Status: DISCONTINUED | OUTPATIENT
Start: 2020-03-22 | End: 2020-03-22

## 2020-03-22 RX ORDER — MORPHINE SULFATE 50 MG/1
2 CAPSULE, EXTENDED RELEASE ORAL EVERY 6 HOURS
Refills: 0 | Status: DISCONTINUED | OUTPATIENT
Start: 2020-03-22 | End: 2020-03-22

## 2020-03-22 RX ORDER — DIPHENHYDRAMINE HCL 50 MG
25 CAPSULE ORAL ONCE
Refills: 0 | Status: COMPLETED | OUTPATIENT
Start: 2020-03-22 | End: 2020-03-22

## 2020-03-22 RX ORDER — INSULIN GLARGINE 100 [IU]/ML
30 INJECTION, SOLUTION SUBCUTANEOUS AT BEDTIME
Refills: 0 | Status: DISCONTINUED | OUTPATIENT
Start: 2020-03-22 | End: 2020-03-23

## 2020-03-22 RX ORDER — VANCOMYCIN HCL 1 G
2000 VIAL (EA) INTRAVENOUS EVERY 12 HOURS
Refills: 0 | Status: DISCONTINUED | OUTPATIENT
Start: 2020-03-22 | End: 2020-03-22

## 2020-03-22 RX ORDER — DIPHENHYDRAMINE HCL 50 MG
25 CAPSULE ORAL EVERY 6 HOURS
Refills: 0 | Status: DISCONTINUED | OUTPATIENT
Start: 2020-03-22 | End: 2020-03-22

## 2020-03-22 RX ORDER — IOHEXOL 300 MG/ML
30 INJECTION, SOLUTION INTRAVENOUS ONCE
Refills: 0 | Status: DISCONTINUED | OUTPATIENT
Start: 2020-03-22 | End: 2020-03-22

## 2020-03-22 RX ORDER — INSULIN LISPRO 100/ML
8 VIAL (ML) SUBCUTANEOUS
Refills: 0 | Status: DISCONTINUED | OUTPATIENT
Start: 2020-03-22 | End: 2020-03-22

## 2020-03-22 RX ORDER — FLUCONAZOLE 150 MG/1
400 TABLET ORAL EVERY 24 HOURS
Refills: 0 | Status: DISCONTINUED | OUTPATIENT
Start: 2020-03-22 | End: 2020-03-23

## 2020-03-22 RX ORDER — IBUPROFEN 200 MG
600 TABLET ORAL ONCE
Refills: 0 | Status: COMPLETED | OUTPATIENT
Start: 2020-03-22 | End: 2020-03-22

## 2020-03-22 RX ORDER — INSULIN LISPRO 100/ML
10 VIAL (ML) SUBCUTANEOUS
Refills: 0 | Status: DISCONTINUED | OUTPATIENT
Start: 2020-03-22 | End: 2020-03-23

## 2020-03-22 RX ORDER — ACETAMINOPHEN 500 MG
650 TABLET ORAL EVERY 6 HOURS
Refills: 0 | Status: DISCONTINUED | OUTPATIENT
Start: 2020-03-22 | End: 2020-03-23

## 2020-03-22 RX ADMIN — Medication 50 GRAM(S): at 09:20

## 2020-03-22 RX ADMIN — Medication 10 UNIT(S): at 19:00

## 2020-03-22 RX ADMIN — Medication 600 MILLIGRAM(S): at 19:45

## 2020-03-22 RX ADMIN — OXYCODONE AND ACETAMINOPHEN 1 TABLET(S): 5; 325 TABLET ORAL at 14:45

## 2020-03-22 RX ADMIN — OXYCODONE AND ACETAMINOPHEN 1 TABLET(S): 5; 325 TABLET ORAL at 22:39

## 2020-03-22 RX ADMIN — OXYCODONE AND ACETAMINOPHEN 1 TABLET(S): 5; 325 TABLET ORAL at 09:30

## 2020-03-22 RX ADMIN — Medication 25 MILLIGRAM(S): at 02:27

## 2020-03-22 RX ADMIN — OXYCODONE AND ACETAMINOPHEN 1 TABLET(S): 5; 325 TABLET ORAL at 13:44

## 2020-03-22 RX ADMIN — OXYCODONE AND ACETAMINOPHEN 1 TABLET(S): 5; 325 TABLET ORAL at 00:09

## 2020-03-22 RX ADMIN — Medication 1 APPLICATORFUL: at 02:27

## 2020-03-22 RX ADMIN — INSULIN GLARGINE 30 UNIT(S): 100 INJECTION, SOLUTION SUBCUTANEOUS at 22:42

## 2020-03-22 RX ADMIN — Medication 8: at 13:47

## 2020-03-22 RX ADMIN — Medication 100 MILLIGRAM(S): at 00:19

## 2020-03-22 RX ADMIN — OXYCODONE AND ACETAMINOPHEN 1 TABLET(S): 5; 325 TABLET ORAL at 10:30

## 2020-03-22 RX ADMIN — Medication 4: at 22:42

## 2020-03-22 RX ADMIN — Medication 3 UNIT(S): at 09:20

## 2020-03-22 RX ADMIN — Medication 300 MILLIGRAM(S): at 06:26

## 2020-03-22 RX ADMIN — Medication 25 MILLIGRAM(S): at 01:22

## 2020-03-22 RX ADMIN — Medication 300 MILLIGRAM(S): at 13:52

## 2020-03-22 RX ADMIN — Medication 6: at 09:21

## 2020-03-22 RX ADMIN — Medication 600 MILLIGRAM(S): at 18:59

## 2020-03-22 RX ADMIN — Medication 8 UNIT(S): at 13:47

## 2020-03-22 RX ADMIN — Medication 4: at 19:01

## 2020-03-22 RX ADMIN — Medication 300 MILLIGRAM(S): at 21:40

## 2020-03-22 RX ADMIN — FLUCONAZOLE 100 MILLIGRAM(S): 150 TABLET ORAL at 15:20

## 2020-03-22 RX ADMIN — OXYCODONE AND ACETAMINOPHEN 1 TABLET(S): 5; 325 TABLET ORAL at 21:39

## 2020-03-22 NOTE — PROGRESS NOTE ADULT - PROBLEM SELECTOR PLAN 6
Patient with history of type 2 Diabetes, does not recall insulin regimen  -Endo saw pt in May 2019, recommended mISS but at that point patient's glucose appeared to be better controlled   -lantus 30 QHS, lispro 8 premeal  -endo has been consulted

## 2020-03-22 NOTE — PROGRESS NOTE ADULT - SUBJECTIVE AND OBJECTIVE BOX
Patient examined at bedside in no acute distress. She experienced a rash and itching when she was given clindamycin overnight. Patient was given benadryl which helped symptoms. patient reports nausea, HA (photophobia/ phonobia as well). She has chronic migraines. Denies fever, chills, CP, SOB, diarrhea.    T(C): 36.6 (20 @ 05:00), Max: 36.7 (20 @ 14:49)  HR: 86 (20 @ 05:00) (86 - 114)  BP: 125/80 (20 @ 05:00) (125/80 - 184/133)  RR: 18 (20 @ 05:00) (18 - 19)  SpO2: 97% (20 @ 05:00) (92% - 99%)  Wt(kg): --Vital Signs Last 24 Hrs      Review of Systems:  -All other ROS negative, except those noted in HPI    PHYSICAL EXAM:  GENERAL: NAD, obese, laying in bed  HEAD:  Atraumatic, Normocephalic  EYES: EOMI, PERRLA, conjunctiva and sclera clear  ENMT: No tonsillar erythema, exudates, or enlargement; Moist mucous membranes, Good dentition, No lesions  NECK: Supple, No JVD  NERVOUS SYSTEM:  Alert & Oriented X3, Good concentration;  CHEST/LUNG: Clear to percussion bilaterally; No rales, rhonchi, wheezing, or rubs  HEART: Regular rate and rhythm; No murmurs, rubs, or gallops  ABDOMEN: Soft, Nontender, Nondistended; Bowel sounds present  EXTREMITIES:  2+ Peripheral Pulses, No clubbing, cyanosis, or edema  : vaginal region appears denuded and slightly red. Clotrimazole cream was noted in region. No overt appearance of cellulitis  LYMPH: No lymphadenopathy noted  SKIN: No rashes or lesions    clotrimazole 1% Vaginal Cream 1 Applicatorful Vaginal at bedtime  dextrose 40% Gel 15 Gram(s) Oral once PRN  dextrose 5%. 1000 milliLiter(s) IV Continuous <Continuous>  dextrose 50% Injectable 12.5 Gram(s) IV Push once  dextrose 50% Injectable 25 Gram(s) IV Push once  dextrose 50% Injectable 25 Gram(s) IV Push once  diphenhydrAMINE 50 milliGRAM(s) Oral every 4 hours PRN  fluconAZOLE IVPB 400 milliGRAM(s) IV Intermittent every 24 hours  glucagon  Injectable 1 milliGRAM(s) IntraMuscular once PRN  insulin glargine Injectable (LANTUS) 30 Unit(s) SubCutaneous at bedtime  insulin lispro (HumaLOG) corrective regimen sliding scale   SubCutaneous Before meals and at bedtime  insulin lispro Injectable (HumaLOG) 8 Unit(s) SubCutaneous three times a day before meals  labetalol 300 milliGRAM(s) Oral every 8 hours  methylPREDNISolone sodium succinate Injectable 100 milliGRAM(s) IV Push once  oxycodone    5 mG/acetaminophen 325 mG 1 Tablet(s) Oral every 4 hours PRN  vancomycin  IVPB 2000 milliGRAM(s) IV Intermittent every 12 hours      LABS:                        12.4   9.39  )-----------( 281      ( 22 Mar 2020 07:11 )             39.4         137  |  102  |  8   ----------------------------<  262<H>  3.9   |  24  |  0.65    Ca    8.1<L>      22 Mar 2020 07:11  Mg     1.7         TPro  6.3  /  Alb  3.1<L>  /  TBili  0.2  /  DBili  x   /  AST  53<H>  /  ALT  38  /  AlkPhos  121<H>  03-22    PT/INR - ( 21 Mar 2020 15:30 )   PT: 12.5 sec;   INR: 1.09          PTT - ( 21 Mar 2020 15:30 )  PTT:28.5 sec  Urinalysis Basic - ( 21 Mar 2020 15:33 )    Color: Yellow / Appearance: Clear / S.010 / pH: x  Gluc: x / Ketone: NEGATIVE  / Bili: Negative / Urobili: 0.2 E.U./dL   Blood: x / Protein: NEGATIVE mg/dL / Nitrite: NEGATIVE   Leuk Esterase: Trace / RBC: < 5 /HPF / WBC 5-10 /HPF   Sq Epi: x / Non Sq Epi: Moderate /HPF / Bacteria: Present /HPF      CAPILLARY BLOOD GLUCOSE      POCT Blood Glucose.: 251 mg/dL (22 Mar 2020 09:10)  POCT Blood Glucose.: 312 mg/dL (22 Mar 2020 01:16)  POCT Blood Glucose.: 317 mg/dL (21 Mar 2020 21:45)  POCT Blood Glucose.: 325 mg/dL (21 Mar 2020 17:21)  POCT Blood Glucose.: 441 mg/dL (21 Mar 2020 14:51)        Urinalysis Basic - ( 21 Mar 2020 15:33 )    Color: Yellow / Appearance: Clear / S.010 / pH: x  Gluc: x / Ketone: NEGATIVE  / Bili: Negative / Urobili: 0.2 E.U./dL   Blood: x / Protein: NEGATIVE mg/dL / Nitrite: NEGATIVE   Leuk Esterase: Trace / RBC: < 5 /HPF / WBC 5-10 /HPF   Sq Epi: x / Non Sq Epi: Moderate /HPF / Bacteria: Present /HPF

## 2020-03-22 NOTE — PROGRESS NOTE ADULT - PROBLEM SELECTOR PLAN 4
Patient with boils and sinus tracts in axilla and groin, suspect underlying HS  -clindamycin topical  -can be followed up as outpatient  -Pain control with prn percocet

## 2020-03-22 NOTE — PROGRESS NOTE ADULT - PROBLEM SELECTOR PLAN 5
Patient reports poorly controlled htn, worsened to 170s-180s over the last 3 weeks after she ran out of her labetalol  -c/w labetalol 300mg tid   -Continue to monitor bps and adjust regimen as needed  -Ensure pt has PCP follow up on discharge or has sufficient refills

## 2020-03-22 NOTE — PROGRESS NOTE ADULT - PROBLEM SELECTOR PLAN 1
Patient presenting with severe erythema, tenderness, and swelling of vulvar, vaginal, and perianal area, in the setting of uncontrolled diabetes. 1/4 SIRS criteria on admission (tachycardia, likely 2/2 pain).   -ID consulted recommend Fluconazole 400 gm IV QD and vancomycin 2 g Q12 until CT results  -As patient allergic to ampicillin and penicillin, and had reaction to Bactrim several weeks ago, and clindamycin overnight  - f/u CT A/P with PO and IV contrast (will be given meythlprednisone and benadryl prior as per radiology protocol)  f not concerning, consider switching to po clindamycin  -OB gyn saw pt in ED, took vaginal swab. F/u cultures  -Pain control with prn percocet q4 hours, increase if necessary Patient presenting with severe erythema, tenderness, and swelling of vulvar, vaginal, and perianal area, in the setting of uncontrolled diabetes. 1/4 SIRS criteria on admission (tachycardia, likely 2/2 pain).   -ID consulted recommend Fluconazole 400 gm IV QD as appears more fungal in nature. will continue to monitor if status worsens will consider daptomycin as patient has allergies to many things including vanc  -As patient allergic to ampicillin and penicillin, and had reaction to Bactrim several weeks ago, and clindamycin overnight  -will hold off on imaging as low utility for noncon CT, and patient is allergic to contrast. cannot give steroids as allergy prophylaxis given uncontrolled sugars  f not concerning, consider switching to po clindamycin  -OB gyn saw pt in ED, took vaginal swab. F/u cultures  -Pain control with prn percocet q4 hours, increase if necessary

## 2020-03-22 NOTE — PROGRESS NOTE ADULT - PROBLEM SELECTOR PLAN 2
Seen by ob gyn in ED who suspect vaginitis is 2/2 yeast infection  -s/p one dose of fluconazole, recommending second dose in one week if symptoms persist  -F/u vaginal swab cultures that were sent  -F/u gyn recs    ADDENDUM: added 1% clindamycin topical to apply to outer vaginal region Seen by ob gyn in ED who suspect vaginitis is 2/2 yeast infection  -s/p one dose of fluconazole, recommending second dose in one week if symptoms persist  -F/u vaginal swab cultures that were sent  -F/u gyn recs

## 2020-03-22 NOTE — CONSULT NOTE ADULT - ATTENDING COMMENTS
A/P: 40y Female with hx of DM presenting for vaginal yeast infection, possible perineal cellulitis with moderate to severe     1.  DM - type 2, uncontrolled, uncomplicated  start 30 units lantus at bedtime, lispro 10 units TId with meals.   Lispro moderate dose scale with meals and at bedtime.   Continue consistent carb diet, Nutrition consult  dilute all medications in NS instead of D5 when possible.   Ensure correct injection and FSG technique    2.  Hyperlipidemia - LDL goal < 70  check lipid profile, start atrovastatin 40mg daily if above goal    3.  Obesity - outpatient medical management.   consider bariatric referral. GLP-1 agonist as outpatient.     4.  Irregualar menses - outpatient eval for possible adrenal contribution.     Pt is advised to follow up with me at discharge.  Please call  to schedule an appointment.

## 2020-03-22 NOTE — PROGRESS NOTE ADULT - ASSESSMENT
Patient is a 39 yo  with PMH of uncontrolled DM2 and HTN as well as a post operative wound infection requiring IR drainage and several abdominal washout of the wound site with surgery team presenting for several weeks of worsening vaginal pain, redness, and swelling, admitted for vaginal cellulitis.

## 2020-03-22 NOTE — PROGRESS NOTE ADULT - PROBLEM SELECTOR PLAN 7
Pt with self-reported history of DVT, though was never on any AC. No calf tenderness or unilateral swelling on exam  -F/u lower extremity duplex

## 2020-03-23 ENCOUNTER — TRANSCRIPTION ENCOUNTER (OUTPATIENT)
Age: 40
End: 2020-03-23

## 2020-03-23 VITALS
DIASTOLIC BLOOD PRESSURE: 70 MMHG | HEART RATE: 90 BPM | SYSTOLIC BLOOD PRESSURE: 148 MMHG | OXYGEN SATURATION: 98 % | TEMPERATURE: 98 F | RESPIRATION RATE: 18 BRPM

## 2020-03-23 DIAGNOSIS — Z86.718 PERSONAL HISTORY OF OTHER VENOUS THROMBOSIS AND EMBOLISM: ICD-10-CM

## 2020-03-23 LAB
-  AMPICILLIN/SULBACTAM: SIGNIFICANT CHANGE UP
-  AMPICILLIN: SIGNIFICANT CHANGE UP
-  CEFAZOLIN: SIGNIFICANT CHANGE UP
-  CEFTRIAXONE: SIGNIFICANT CHANGE UP
-  CIPROFLOXACIN: SIGNIFICANT CHANGE UP
-  GENTAMICIN: SIGNIFICANT CHANGE UP
-  MEROPENEM: SIGNIFICANT CHANGE UP
-  NITROFURANTOIN: SIGNIFICANT CHANGE UP
-  PIPERACILLIN/TAZOBACTAM: SIGNIFICANT CHANGE UP
-  TOBRAMYCIN: SIGNIFICANT CHANGE UP
-  TRIMETHOPRIM/SULFAMETHOXAZOLE: SIGNIFICANT CHANGE UP
ANION GAP SERPL CALC-SCNC: 13 MMOL/L — SIGNIFICANT CHANGE UP (ref 5–17)
BASOPHILS # BLD AUTO: 0.03 K/UL — SIGNIFICANT CHANGE UP (ref 0–0.2)
BASOPHILS NFR BLD AUTO: 0.3 % — SIGNIFICANT CHANGE UP (ref 0–2)
BUN SERPL-MCNC: 8 MG/DL — SIGNIFICANT CHANGE UP (ref 7–23)
C PEPTIDE SERPL-MCNC: 2.9 NG/ML — SIGNIFICANT CHANGE UP (ref 1.1–4.4)
CALCIUM SERPL-MCNC: 8.3 MG/DL — LOW (ref 8.4–10.5)
CHLORIDE SERPL-SCNC: 98 MMOL/L — SIGNIFICANT CHANGE UP (ref 96–108)
CO2 SERPL-SCNC: 23 MMOL/L — SIGNIFICANT CHANGE UP (ref 22–31)
CREAT SERPL-MCNC: 0.62 MG/DL — SIGNIFICANT CHANGE UP (ref 0.5–1.3)
CULTURE RESULTS: SIGNIFICANT CHANGE UP
EOSINOPHIL # BLD AUTO: 0.17 K/UL — SIGNIFICANT CHANGE UP (ref 0–0.5)
EOSINOPHIL NFR BLD AUTO: 1.8 % — SIGNIFICANT CHANGE UP (ref 0–6)
GLUCOSE SERPL-MCNC: 220 MG/DL — HIGH (ref 70–99)
HCT VFR BLD CALC: 40.5 % — SIGNIFICANT CHANGE UP (ref 34.5–45)
HGB BLD-MCNC: 12.6 G/DL — SIGNIFICANT CHANGE UP (ref 11.5–15.5)
IMM GRANULOCYTES NFR BLD AUTO: 0.5 % — SIGNIFICANT CHANGE UP (ref 0–1.5)
LYMPHOCYTES # BLD AUTO: 3.09 K/UL — SIGNIFICANT CHANGE UP (ref 1–3.3)
LYMPHOCYTES # BLD AUTO: 32.6 % — SIGNIFICANT CHANGE UP (ref 13–44)
MAGNESIUM SERPL-MCNC: 2 MG/DL — SIGNIFICANT CHANGE UP (ref 1.6–2.6)
MCHC RBC-ENTMCNC: 25.8 PG — LOW (ref 27–34)
MCHC RBC-ENTMCNC: 31.1 GM/DL — LOW (ref 32–36)
MCV RBC AUTO: 82.8 FL — SIGNIFICANT CHANGE UP (ref 80–100)
METHOD TYPE: SIGNIFICANT CHANGE UP
MONOCYTES # BLD AUTO: 0.68 K/UL — SIGNIFICANT CHANGE UP (ref 0–0.9)
MONOCYTES NFR BLD AUTO: 7.2 % — SIGNIFICANT CHANGE UP (ref 2–14)
NEUTROPHILS # BLD AUTO: 5.47 K/UL — SIGNIFICANT CHANGE UP (ref 1.8–7.4)
NEUTROPHILS NFR BLD AUTO: 57.6 % — SIGNIFICANT CHANGE UP (ref 43–77)
NRBC # BLD: 0 /100 WBCS — SIGNIFICANT CHANGE UP (ref 0–0)
ORGANISM # SPEC MICROSCOPIC CNT: SIGNIFICANT CHANGE UP
ORGANISM # SPEC MICROSCOPIC CNT: SIGNIFICANT CHANGE UP
PLATELET # BLD AUTO: 298 K/UL — SIGNIFICANT CHANGE UP (ref 150–400)
POTASSIUM SERPL-MCNC: 3.6 MMOL/L — SIGNIFICANT CHANGE UP (ref 3.5–5.3)
POTASSIUM SERPL-SCNC: 3.6 MMOL/L — SIGNIFICANT CHANGE UP (ref 3.5–5.3)
RBC # BLD: 4.89 M/UL — SIGNIFICANT CHANGE UP (ref 3.8–5.2)
RBC # FLD: 15.9 % — HIGH (ref 10.3–14.5)
SODIUM SERPL-SCNC: 134 MMOL/L — LOW (ref 135–145)
SPECIMEN SOURCE: SIGNIFICANT CHANGE UP
WBC # BLD: 9.49 K/UL — SIGNIFICANT CHANGE UP (ref 3.8–10.5)
WBC # FLD AUTO: 9.49 K/UL — SIGNIFICANT CHANGE UP (ref 3.8–10.5)

## 2020-03-23 PROCEDURE — 85610 PROTHROMBIN TIME: CPT

## 2020-03-23 PROCEDURE — 83036 HEMOGLOBIN GLYCOSYLATED A1C: CPT

## 2020-03-23 PROCEDURE — 93970 EXTREMITY STUDY: CPT

## 2020-03-23 PROCEDURE — 85025 COMPLETE CBC W/AUTO DIFF WBC: CPT

## 2020-03-23 PROCEDURE — 84702 CHORIONIC GONADOTROPIN TEST: CPT

## 2020-03-23 PROCEDURE — 82803 BLOOD GASES ANY COMBINATION: CPT

## 2020-03-23 PROCEDURE — 96375 TX/PRO/DX INJ NEW DRUG ADDON: CPT

## 2020-03-23 PROCEDURE — 99285 EMERGENCY DEPT VISIT HI MDM: CPT | Mod: 25

## 2020-03-23 PROCEDURE — 80048 BASIC METABOLIC PNL TOTAL CA: CPT

## 2020-03-23 PROCEDURE — 87040 BLOOD CULTURE FOR BACTERIA: CPT

## 2020-03-23 PROCEDURE — 84681 ASSAY OF C-PEPTIDE: CPT

## 2020-03-23 PROCEDURE — 99232 SBSQ HOSP IP/OBS MODERATE 35: CPT | Mod: GC

## 2020-03-23 PROCEDURE — 81001 URINALYSIS AUTO W/SCOPE: CPT

## 2020-03-23 PROCEDURE — 87086 URINE CULTURE/COLONY COUNT: CPT

## 2020-03-23 PROCEDURE — 82010 KETONE BODYS QUAN: CPT

## 2020-03-23 PROCEDURE — 96374 THER/PROPH/DIAG INJ IV PUSH: CPT

## 2020-03-23 PROCEDURE — 93970 EXTREMITY STUDY: CPT | Mod: 26

## 2020-03-23 PROCEDURE — 85730 THROMBOPLASTIN TIME PARTIAL: CPT

## 2020-03-23 PROCEDURE — 87798 DETECT AGENT NOS DNA AMP: CPT

## 2020-03-23 PROCEDURE — 82962 GLUCOSE BLOOD TEST: CPT

## 2020-03-23 PROCEDURE — 99239 HOSP IP/OBS DSCHRG MGMT >30: CPT

## 2020-03-23 PROCEDURE — 87186 SC STD MICRODIL/AGAR DIL: CPT

## 2020-03-23 PROCEDURE — 80061 LIPID PANEL: CPT

## 2020-03-23 PROCEDURE — 80053 COMPREHEN METABOLIC PANEL: CPT

## 2020-03-23 PROCEDURE — 87801 DETECT AGNT MULT DNA AMPLI: CPT

## 2020-03-23 PROCEDURE — 83735 ASSAY OF MAGNESIUM: CPT

## 2020-03-23 PROCEDURE — 36415 COLL VENOUS BLD VENIPUNCTURE: CPT

## 2020-03-23 PROCEDURE — 87491 CHLMYD TRACH DNA AMP PROBE: CPT

## 2020-03-23 PROCEDURE — 87661 TRICHOMONAS VAGINALIS AMPLIF: CPT

## 2020-03-23 PROCEDURE — 85027 COMPLETE CBC AUTOMATED: CPT

## 2020-03-23 RX ORDER — INSULIN LISPRO 100/ML
14 VIAL (ML) SUBCUTANEOUS
Qty: 200 | Refills: 0
Start: 2020-03-23 | End: 2020-04-21

## 2020-03-23 RX ORDER — INSULIN GLARGINE 100 [IU]/ML
40 INJECTION, SOLUTION SUBCUTANEOUS
Qty: 200 | Refills: 2
Start: 2020-03-23 | End: 2020-06-20

## 2020-03-23 RX ORDER — IBUPROFEN 200 MG
400 TABLET ORAL ONCE
Refills: 0 | Status: COMPLETED | OUTPATIENT
Start: 2020-03-23 | End: 2020-03-23

## 2020-03-23 RX ORDER — LIDOCAINE 4 G/100G
10 CREAM TOPICAL
Qty: 70 | Refills: 0
Start: 2020-03-23 | End: 2020-03-29

## 2020-03-23 RX ORDER — INSULIN LISPRO 100/ML
30 VIAL (ML) SUBCUTANEOUS
Qty: 0 | Refills: 0 | DISCHARGE
Start: 2020-03-23 | End: 2020-04-21

## 2020-03-23 RX ORDER — INSULIN GLARGINE 100 [IU]/ML
30 INJECTION, SOLUTION SUBCUTANEOUS
Qty: 100 | Refills: 2
Start: 2020-03-23 | End: 2020-06-20

## 2020-03-23 RX ORDER — LABETALOL HCL 100 MG
1 TABLET ORAL
Qty: 90 | Refills: 2
Start: 2020-03-23 | End: 2020-06-20

## 2020-03-23 RX ORDER — LABETALOL HCL 100 MG
2 TABLET ORAL
Qty: 0 | Refills: 2 | DISCHARGE
Start: 2020-03-23 | End: 2020-06-20

## 2020-03-23 RX ORDER — INSULIN GLARGINE 100 [IU]/ML
35 INJECTION, SOLUTION SUBCUTANEOUS
Qty: 0 | Refills: 2 | DISCHARGE
Start: 2020-03-23 | End: 2020-06-20

## 2020-03-23 RX ORDER — FLUCONAZOLE 150 MG/1
2 TABLET ORAL
Qty: 20 | Refills: 0
Start: 2020-03-23 | End: 2020-04-01

## 2020-03-23 RX ORDER — LIDOCAINE 4 G/100G
1 CREAM TOPICAL ONCE
Refills: 0 | Status: COMPLETED | OUTPATIENT
Start: 2020-03-23 | End: 2020-03-23

## 2020-03-23 RX ORDER — INSULIN LISPRO 100/ML
10 VIAL (ML) SUBCUTANEOUS
Qty: 100 | Refills: 2
Start: 2020-03-23 | End: 2020-06-20

## 2020-03-23 RX ADMIN — Medication 650 MILLIGRAM(S): at 09:31

## 2020-03-23 RX ADMIN — Medication 650 MILLIGRAM(S): at 11:18

## 2020-03-23 RX ADMIN — Medication 6: at 13:06

## 2020-03-23 RX ADMIN — Medication 400 MILLIGRAM(S): at 01:52

## 2020-03-23 RX ADMIN — FLUCONAZOLE 100 MILLIGRAM(S): 150 TABLET ORAL at 11:36

## 2020-03-23 RX ADMIN — Medication 50 MILLIGRAM(S): at 03:29

## 2020-03-23 RX ADMIN — LIDOCAINE 1 APPLICATION(S): 4 CREAM TOPICAL at 06:19

## 2020-03-23 RX ADMIN — Medication 4: at 09:26

## 2020-03-23 RX ADMIN — Medication 400 MILLIGRAM(S): at 11:52

## 2020-03-23 RX ADMIN — Medication 300 MILLIGRAM(S): at 06:19

## 2020-03-23 RX ADMIN — Medication 10 UNIT(S): at 13:06

## 2020-03-23 RX ADMIN — Medication 300 MILLIGRAM(S): at 13:45

## 2020-03-23 RX ADMIN — Medication 10 UNIT(S): at 09:26

## 2020-03-23 RX ADMIN — Medication 1 APPLICATORFUL: at 02:29

## 2020-03-23 NOTE — DISCHARGE NOTE PROVIDER - NSDCCPCAREPLAN_GEN_ALL_CORE_FT
PRINCIPAL DISCHARGE DIAGNOSIS  Diagnosis: Vulvovaginitis due to yeast  Assessment and Plan of Treatment: You came into the hospital with severe pain, redness, and swelling of the genital area, which you have had before to a lesser extent. We had our gynecology team and our infectious team evaluate you in the hospital, and you were diagnosed with a recurrent vaginal yeast infection. We started you on IV antifungals while you were in the hospital and are discharging you on oral antifungals for 10 days, as well as an antifungal cream to be used for 6 more days and lidocaine jelly to help relieve your symptoms. Please follow up with your gynecologist.      SECONDARY DISCHARGE DIAGNOSES  Diagnosis: Essential hypertension  Assessment and Plan of Treatment: You have a known history of hypertension for which you should be taking labetalol. We sent refills of this prescription to your pharmacy. Please continue to take this medication and you will need a primary care doctor to help you manage all of your medical conditions. We provided our [facility] resident clinic with your information and they will call you to set up a follow up appointment.    Diagnosis: Diabetes  Assessment and Plan of Treatment: You have a known history of diabetes that is poorly controlled. You were seen by an endocrinologist while in the hospital. You were started on 30 units of lantus daily and 10 units of lispro three times per day, as well as a sliding scale. Please continue to check your sugars before meals and continue with the sliding scale as follows:   If fingerstick between 151-200, give 2 units  If between 201-250, give 4 units  If between 251-300, give 6 units  If between 301-350, give 8 units  If between 351-400, give 10 units  If over 400, give 12 units  Please make sure to follow up with Dr. Hannah, the endocrinologist who saw you in the hospital.    Diagnosis: History of DVT in adulthood  Assessment and Plan of Treatment: You reported a history of a blood clot in your leg in the past for which you were not given medication for. We wanted to confirm you did not have a blood clot and therefore we got an ultrasound of your leg which did not show any clots. PRINCIPAL DISCHARGE DIAGNOSIS  Diagnosis: Vulvovaginitis due to yeast  Assessment and Plan of Treatment: You came into the hospital with severe pain, redness, and swelling of the genital area, which you have had before to a lesser extent. We had our gynecology team and our infectious team evaluate you in the hospital, and you were diagnosed with a recurrent vaginal yeast infection. We started you on IV antifungals while you were in the hospital and are discharging you on oral antifungals for 10 days, as well as an antifungal cream to be used for 6 more days and lidocaine jelly to help relieve your symptoms. Please follow up with your gynecologist.      SECONDARY DISCHARGE DIAGNOSES  Diagnosis: Essential hypertension  Assessment and Plan of Treatment: You have a known history of hypertension for which you should be taking labetalol. We sent refills of this prescription to your pharmacy. Please continue to take this medication and you will need a primary care doctor to help you manage all of your medical conditions. We provided our [facility] resident clinic with your information and they will call you to set up a follow up appointment.    Diagnosis: Diabetes  Assessment and Plan of Treatment: You have a known history of diabetes that is poorly controlled. You were seen by an endocrinologist while in the hospital. You were started on 40 units of lantus daily and 14 units of lispro three times per day, as well as a sliding scale. Please continue to check your sugars before meals and continue with the sliding scale as follows:   If fingerstick between 151-200, give 2 units  If between 201-250, give 4 units  If between 251-300, give 6 units  If between 301-350, give 8 units  If between 351-400, give 10 units  If over 400, give 12 units  Please make sure to follow up with Dr. Hannah, the endocrinologist who saw you in the hospital.    Diagnosis: History of DVT in adulthood  Assessment and Plan of Treatment: You reported a history of a blood clot in your leg in the past for which you were not given medication for. We wanted to confirm you did not have a blood clot and therefore we got an ultrasound of your leg which did not show any clots.

## 2020-03-23 NOTE — PROGRESS NOTE ADULT - ASSESSMENT
Patient is a 39 yo  with PMH of uncontrolled DM2 and HTN as well as a post operative wound infection requiring IR drainage and several abdominal washout of the wound site with surgery team presenting for several weeks of worsening vaginal pain, redness, and swelling, admitted for vaginal cellulitis.     #Vaginal cellulitis and suspected vaginal yeast infection  -continue with fluconazole 400 mg QD for 10 d duration of therapy  -continue with clotrimazole cream for 7 d  -ID will reach out to patient to set up an appointment for outpatient follow up      ID will sign off on this case, please reconsult us if there are further questions or concerns. Patient is a 39 yo  with PMH of uncontrolled DM2 and HTN as well as a post operative wound infection requiring IR drainage and several abdominal washout of the wound site with surgery team presenting for several weeks of worsening vaginal pain, redness, and swelling, admitted for vaginal cellulitis.     bacteria in the urine is most likely contamination. This was not a clean catch specimen  She is clinically improving I do not think she needs any other antibiotics at the current time    #Vaginal cellulitis and suspected vaginal yeast infection  -continue with fluconazole 400 mg QD for 10 d duration of therapy  -continue with clotrimazole cream for 7 d  -ID will reach out to patient to set up an appointment for outpatient follow up      ID will sign off on this case, please reconsult us if there are further questions or concerns.

## 2020-03-23 NOTE — PROGRESS NOTE ADULT - ATTENDING COMMENTS
Pt seen on rounds this afternoon and events of the weekend reviewed.  Pt was in extremely poor control prior to admission, partly due to gibran dietary non-compliance (especially with juices, etc) and partly due to a less than optimal insulin regimen (with her not knowing the names of the insulin).  Since she described two doses a day, each with two different insulins, and one insulin likely cloudy in appearance, I suspect that she was on a BID regimen of NPH and lispro.  Will discharge on Lantus and lispro, and we explained this to her in detail  Her fingersticks are still in the 200 range on 30/10, so will discharge on 40 Lantus/14 lispro, though these will almost certainly need to increase.  Prognosis for compliance is guarded--when I explained that she should not be drinking juice when her sugars are high and she is thirsty as a result, she did not seem convinced that she needed to avoid these.
# Vulvovaginitis   - Low suspicion for cellulitis, off of abx  - Continue fluconazole since very likely yeast infection, IV while inpatient, can transition to PO for discharge  - Tylenol for pain control  - 24hrs discharge notice given today    # DM  - Lantus 30U qhs and 10 of lispro tid with meals

## 2020-03-23 NOTE — DISCHARGE NOTE PROVIDER - HOSPITAL COURSE
#Discharge: do not delete        Patient is a 39 yo  with PMH of uncontrolled DM2 and HTN as well as a post operative wound infection following birth in  requiring IR drainage and several abdominal washout of wound site with surgery team following b/l hernia repair, presenting for several weeks of worsening vaginal pain, redness, and swelling, found to have a vaginal yeast infection and a UTI.        Problem List/Main Diagnoses (system-based):     1) Yeast infection: Patient presented with severe, recurrent erythema, swelling, and tenderness of vulvovaginal area. Vital signs stable aside from sinus tachycardia (likely 2/2 pain) and no leukocytosis. Initially concern for vaginal cellulitis and ID consulted. Patient transiently on IV antibiotics though discontinued as very low suspicion for cellulitis and symptoms improving with fluconazole, clotrimazole cream, and lidocaine jelly. Gyn saw pt in ED and agreed with diagnosis of yeast infection. Consideration for CT A/P to r/o abscess but patient allergic to contrast and given very low suspicion and patient improvement, decision made to forego. Pt discharged on oral fluconazole 150mg every 72 hours for 14 days followed by fluconazole 150mg weekly for 6 months for recurrent vaginal yeast infections, also with clotrimazole vaginal cream and lidocaine gel.         2) Diabetes: Poorly controlled diabetes with HgA1c of 12, on unknown regimen at home. Seen by jose g, discharged on lantus 30 and lispro 10 tid with moderate ISS. To follow up with Dr. Hannah.          3) Hypertension: Pt came in hypertensive with systolics 170s-180s, stating she ran out of her labetalol three weeks ago. Restarted on home labetalol 300 q8 hours with improvement in bp. Discharged home on this med.        4) UTI: Pt with dysuria and Ucx >100k E coli (sensitive to nitrofurantoin.) Discharged on 5 days nitrofurantoin        New medications: fluconazole 150mg every 72 hours for 14 days followed by fluconazole 150mg weekly for 6 months, nitrofurantoin 100mg bid x5 days, lidocaine gel, clotrimazole 1% vaginal cream, lantus 30 daily, 10 lispro tid    Labs to be followed outpatient: cbc, hga1c    Exam to be followed outpatient: basic exam, gu exam #Discharge: do not delete        Patient is a 41 yo  with PMH of uncontrolled DM2 and HTN as well as a post operative wound infection following birth in  requiring IR drainage and several abdominal washout of wound site with surgery team following b/l hernia repair, presenting for several weeks of worsening vaginal pain, redness, and swelling, found to have a vaginal yeast infection and a UTI.        Problem List/Main Diagnoses (system-based):     1) Yeast infection: Patient presented with severe, recurrent erythema, swelling, and tenderness of vulvovaginal area. Vital signs stable aside from sinus tachycardia (likely 2/2 pain) and no leukocytosis. Initially concern for vaginal cellulitis and ID consulted. Patient transiently on IV antibiotics though discontinued as very low suspicion for cellulitis and symptoms improving with fluconazole, clotrimazole cream, and lidocaine jelly. Gyn saw pt in ED and agreed with diagnosis of yeast infection. Consideration for CT A/P to r/o abscess but patient allergic to contrast and given very low suspicion and patient improvement, decision made to forego. Pt with negative UA but UTI growing >100k E coli and with dysuria, did not treat per ID recs as unlikely to be clean catch, dysuria likely from yeast infection, and abx would worsen yeast infection. Pt discharged on oral fluconazole 400mg daily for 10 days for recurrent vaginal/vulvar/perineal yeast infections, also with clotrimazole vaginal cream and lidocaine gel.         2) Diabetes: Poorly controlled diabetes with HgA1c of 12, on unknown regimen at home. Seen by endo, discharged on lantus 30 and lispro 10 tid with moderate ISS. To follow up with Dr. Hannah.          3) Hypertension: Pt came in hypertensive with systolics 170s-180s, stating she ran out of her labetalol three weeks ago. Restarted on home labetalol 300 q8 hours with improvement in bp. Discharged home on this med.        4) R/o DVT: Pt reported history of DVT though was never on AC. Duplex of lower extremities negative for DVT        New medications: fluconazole 150mg every 72 hours for 14 days followed by fluconazole 150mg weekly for 6 months, lidocaine gel, clotrimazole 1% vaginal cream, lantus 30 daily, 10 lispro tid    Labs to be followed outpatient: cbc, hga1c    Exam to be followed outpatient: basic exam, gu exam #Discharge: do not delete        Patient is a 39 yo  with PMH of uncontrolled DM2 and HTN as well as a post operative wound infection following birth in  requiring IR drainage and several abdominal washout of wound site with surgery team following b/l hernia repair, presenting for several weeks of worsening vaginal pain, redness, and swelling, found to have a vaginal yeast infection and a UTI.        Problem List/Main Diagnoses (system-based):     1) Yeast infection: Patient presented with severe, recurrent erythema, swelling, and tenderness of vulvovaginal area. Vital signs stable aside from sinus tachycardia (likely 2/2 pain) and no leukocytosis. Initially concern for vaginal cellulitis and ID consulted. Patient transiently on IV antibiotics though discontinued as very low suspicion for cellulitis and symptoms improving with fluconazole, clotrimazole cream, and lidocaine jelly. Gyn saw pt in ED and agreed with diagnosis of yeast infection. Consideration for CT A/P to r/o abscess but patient allergic to contrast and given very low suspicion and patient improvement, decision made to forego. Pt with negative UA but UTI growing >100k E coli and with dysuria, did not treat per ID recs as unlikely to be clean catch, dysuria likely from yeast infection, and abx would worsen yeast infection. Pt discharged on oral fluconazole 400mg daily for 10 days for recurrent vaginal/vulvar/perineal yeast infections, also with clotrimazole vaginal cream and lidocaine gel.         2) Diabetes: Poorly controlled diabetes with HgA1c of 12, on unknown regimen at home. Seen by endo, discharged on lantus 30 and lispro 10 tid with moderate ISS. To follow up with Dr. Hannah.          3) Hypertension: Pt came in hypertensive with systolics 170s-180s, stating she ran out of her labetalol three weeks ago. Restarted on home labetalol 300 q8 hours with improvement in bp. Discharged home on this med.        4) R/o DVT: Pt reported history of DVT though was never on AC. Duplex of lower extremities negative for DVT        New medications: fluconazole 150mg every 72 hours for 14 days followed by fluconazole 150mg weekly for 6 months, lidocaine gel, clotrimazole 1% vaginal cream, lantus 40 daily, 14 lispro tid    Labs to be followed outpatient: cbc, hga1c    Exam to be followed outpatient: basic exam, gu exam

## 2020-03-23 NOTE — DISCHARGE NOTE NURSING/CASE MANAGEMENT/SOCIAL WORK - NSDCFUADDAPPT_GEN_ALL_CORE_FT
The [facility] clinic (listed under Dr. Santillan) has been made aware of your admission and will call you to set up a follow up appointment to establish a primary care doctor.     You will have to call Dr. Hannah's office (the endocrinologist) to set up a follow up appointment at your convenience.

## 2020-03-23 NOTE — PROGRESS NOTE ADULT - SUBJECTIVE AND OBJECTIVE BOX
Overnight: No acute events, patient states that her rash is improving.     MEDICATIONS  (STANDING):  clotrimazole 1% Vaginal Cream 1 Applicatorful Vaginal at bedtime  dextrose 5%. 1000 milliLiter(s) (50 mL/Hr) IV Continuous <Continuous>  dextrose 50% Injectable 12.5 Gram(s) IV Push once  dextrose 50% Injectable 25 Gram(s) IV Push once  dextrose 50% Injectable 25 Gram(s) IV Push once  fluconAZOLE IVPB 400 milliGRAM(s) IV Intermittent every 24 hours  insulin glargine Injectable (LANTUS) 30 Unit(s) SubCutaneous at bedtime  insulin lispro (HumaLOG) corrective regimen sliding scale   SubCutaneous Before meals and at bedtime  insulin lispro Injectable (HumaLOG) 10 Unit(s) SubCutaneous three times a day before meals  labetalol 300 milliGRAM(s) Oral every 8 hours    MEDICATIONS  (PRN):  acetaminophen   Tablet .. 650 milliGRAM(s) Oral every 6 hours PRN Severe Pain (7 - 10)  dextrose 40% Gel 15 Gram(s) Oral once PRN Blood Glucose LESS THAN 70 milliGRAM(s)/deciliter  diphenhydrAMINE 50 milliGRAM(s) Oral every 4 hours PRN Rash and/or Itching  glucagon  Injectable 1 milliGRAM(s) IntraMuscular once PRN Glucose LESS THAN 70 milligrams/deciliter      fluconAZOLE IVPB 400 milliGRAM(s) IV Intermittent every 24 hours  vancomycin  IVPB 1000 milliGRAM(s) IV Intermittent every 12 hours    Allergies    amoxicillin (Unknown)  clindamycin (Pruritus)  iodine containing compounds (Hives; Anaphylaxis)  penicillin (Hives)  shellfish. (Hives; Anaphylaxis)    Intolerances    Bactrim I.V. (Rash (Mod to Severe))    Vital Signs Last 24 Hrs  T(C): 36.6 (23 Mar 2020 09:33), Max: 36.7 (22 Mar 2020 21:13)  T(F): 97.9 (23 Mar 2020 09:33), Max: 98 (22 Mar 2020 21:13)  HR: 90 (23 Mar 2020 09:33) (81 - 93)  BP: 148/70 (23 Mar 2020 09:33) (133/76 - 169/93)  BP(mean): 96 (23 Mar 2020 09:33) (96 - 96)  RR: 18 (23 Mar 2020 09:33) (16 - 18)  SpO2: 98% (23 Mar 2020 09:33) (96% - 99%)    PHYSICAL EXAM:    General: Well developed; well nourished; in no acute distress  Eyes: PERRL, EOM intact; conjunctiva and sclera clear  Head: Normocephalic; atraumatic  ENMT: No nasal discharge; airway clear  Neck: Supple; non tender; no masses  Respiratory: No wheezes, rales or rhonchi  Cardiovascular: Regular rate and rhythm. S1 and S2 Normal; No murmurs, gallops or rubs  Gastrointestinal: Soft non-tender non-distended; Normal bowel sounds; No hepatosplenomegaly  Genitourinary: No costovertebral angle tenderness, rash is present vaginal region, and white flakes noted  Extremities: Normal range of motion, No clubbing, cyanosis or edema  Vascular: Peripheral pulses palpable 2+ bilaterally  Neurological: Alert and oriented x3  Skin: Warm and dry. No acute rash  Lymph Nodes: No acute cervical adenopathy  Musculoskeletal: Normal gait, tone, without deformities  mild redness over perineal area  .  LABS:                         12.6   9.49  )-----------( 298      ( 23 Mar 2020 08:20 )             40.5     03-23    134<L>  |  98  |  8   ----------------------------<  220<H>  3.6   |  23  |  0.62    Ca    8.3<L>      23 Mar 2020 08:20  Mg     2.0     03-23    TPro  6.3  /  Alb  3.1<L>  /  TBili  0.2  /  DBili  x   /  AST  53<H>  /  ALT  38  /  AlkPhos  121<H>  03-22    PT/INR - ( 21 Mar 2020 15:30 )   PT: 12.5 sec;   INR: 1.09          PTT - ( 21 Mar 2020 15:30 )  PTT:28.5 sec  Urinalysis Basic - ( 21 Mar 2020 15:33 )    Color: Yellow / Appearance: Clear / S.010 / pH: x  Gluc: x / Ketone: NEGATIVE  / Bili: Negative / Urobili: 0.2 E.U./dL   Blood: x / Protein: NEGATIVE mg/dL / Nitrite: NEGATIVE   Leuk Esterase: Trace / RBC: < 5 /HPF / WBC 5-10 /HPF   Sq Epi: x / Non Sq Epi: Moderate /HPF / Bacteria: Present /HPF    Culture - Urine (20 @ 17:16)    -  Piperacillin/Tazobactam: R >64    -  Nitrofurantoin: S <=32 Should not be used to treat pyelonephritis    -  Trimethoprim/Sulfamethoxazole: R >2/38    -  Tobramycin: S <=2    -  Meropenem: S <=1    -  Gentamicin: S <=1    -  Ciprofloxacin: R >2    -  Cefazolin: S 8 (MIC_CL_COM_ENTERIC_CEFAZU) For uncomplicated UTI with K. pneumoniae, E. coli, or P. mirablis: FESTUS <=16 is sensitive and FESTUS >=32 is resistant. This also predicts results for oral agents cefaclor, cefdinir, cefpodoxime, cefprozil, cefuroxime axetil, cephalexin and locarbef for uncomplicated UTI. Note that some isolates may be susceptible to these agents while testing resistant to cefazolin.    -  Ceftriaxone: S <=1 Enterobacter, Citrobacter, and Serratia may develop resistance during prolonged therapy    -  Ampicillin: R >16 These ampicillin results predict results for amoxicillin    -  Ampicillin/Sulbactam: R >16/8 Enterobacter, Citrobacter, and Serratia may develop resistance during prolonged therapy (3-4 days)    Specimen Source: .Urine Clean Catch (Midstream)    Culture Results:   >100,000 CFU/ml Escherichia coli    Organism Identification: Escherichia coli    Organism: Escherichia coli    Method Type: FESTUS Overnight: No acute events, patient states that her rash is improving.     MEDICATIONS  (STANDING):  clotrimazole 1% Vaginal Cream 1 Applicatorful Vaginal at bedtime  dextrose 5%. 1000 milliLiter(s) (50 mL/Hr) IV Continuous <Continuous>  dextrose 50% Injectable 12.5 Gram(s) IV Push once  dextrose 50% Injectable 25 Gram(s) IV Push once  dextrose 50% Injectable 25 Gram(s) IV Push once  fluconAZOLE IVPB 400 milliGRAM(s) IV Intermittent every 24 hours  insulin glargine Injectable (LANTUS) 30 Unit(s) SubCutaneous at bedtime  insulin lispro (HumaLOG) corrective regimen sliding scale   SubCutaneous Before meals and at bedtime  insulin lispro Injectable (HumaLOG) 10 Unit(s) SubCutaneous three times a day before meals  labetalol 300 milliGRAM(s) Oral every 8 hours    MEDICATIONS  (PRN):  acetaminophen   Tablet .. 650 milliGRAM(s) Oral every 6 hours PRN Severe Pain (7 - 10)  dextrose 40% Gel 15 Gram(s) Oral once PRN Blood Glucose LESS THAN 70 milliGRAM(s)/deciliter  diphenhydrAMINE 50 milliGRAM(s) Oral every 4 hours PRN Rash and/or Itching  glucagon  Injectable 1 milliGRAM(s) IntraMuscular once PRN Glucose LESS THAN 70 milligrams/deciliter      fluconAZOLE IVPB 400 milliGRAM(s) IV Intermittent every 24 hours  vancomycin  IVPB 1000 milliGRAM(s) IV Intermittent every 12 hours    Allergies    amoxicillin (Unknown)  clindamycin (Pruritus)  iodine containing compounds (Hives; Anaphylaxis)  penicillin (Hives)  shellfish. (Hives; Anaphylaxis)    Intolerances    Bactrim I.V. (Rash (Mod to Severe))    Vital Signs Last 24 Hrs  T(C): 36.6 (23 Mar 2020 09:33), Max: 36.7 (22 Mar 2020 21:13)  T(F): 97.9 (23 Mar 2020 09:33), Max: 98 (22 Mar 2020 21:13)  HR: 90 (23 Mar 2020 09:33) (81 - 93)  BP: 148/70 (23 Mar 2020 09:33) (133/76 - 169/93)  BP(mean): 96 (23 Mar 2020 09:33) (96 - 96)  RR: 18 (23 Mar 2020 09:33) (16 - 18)  SpO2: 98% (23 Mar 2020 09:33) (96% - 99%)    PHYSICAL EXAM:    General: Well developed; well nourished; in no acute distress  Eyes: PERRL, EOM intact; conjunctiva and sclera clear  Head: Normocephalic; atraumatic  ENMT: No nasal discharge; airway clear  Neck: Supple; non tender; no masses  Respiratory: No wheezes, rales or rhonchi  Cardiovascular: Regular rate and rhythm. S1 and S2 Normal; No murmurs, gallops or rubs  Gastrointestinal: Soft non-tender non-distended; Normal bowel sounds; No hepatosplenomegaly  Genitourinary: No costovertebral angle tenderness, rash is decreased  Extremities: Normal range of motion, No clubbing, cyanosis or edema  Vascular: Peripheral pulses palpable 2+ bilaterally  Neurological: Alert and oriented x3  Skin: Warm and dry. No acute rash  Lymph Nodes: No acute cervical adenopathy  Musculoskeletal: Normal gait, tone, without deformities  mild redness over perineal area  .  LABS:                         12.6   9.49  )-----------( 298      ( 23 Mar 2020 08:20 )             40.5     03-23    134<L>  |  98  |  8   ----------------------------<  220<H>  3.6   |  23  |  0.62    Ca    8.3<L>      23 Mar 2020 08:20  Mg     2.0     03-23    TPro  6.3  /  Alb  3.1<L>  /  TBili  0.2  /  DBili  x   /  AST  53<H>  /  ALT  38  /  AlkPhos  121<H>  03-22    PT/INR - ( 21 Mar 2020 15:30 )   PT: 12.5 sec;   INR: 1.09          PTT - ( 21 Mar 2020 15:30 )  PTT:28.5 sec  Urinalysis Basic - ( 21 Mar 2020 15:33 )    Color: Yellow / Appearance: Clear / S.010 / pH: x  Gluc: x / Ketone: NEGATIVE  / Bili: Negative / Urobili: 0.2 E.U./dL   Blood: x / Protein: NEGATIVE mg/dL / Nitrite: NEGATIVE   Leuk Esterase: Trace / RBC: < 5 /HPF / WBC 5-10 /HPF   Sq Epi: x / Non Sq Epi: Moderate /HPF / Bacteria: Present /HPF    Culture - Urine (20 @ 17:16)    -  Piperacillin/Tazobactam: R >64    -  Nitrofurantoin: S <=32 Should not be used to treat pyelonephritis    -  Trimethoprim/Sulfamethoxazole: R >2/38    -  Tobramycin: S <=2    -  Meropenem: S <=1    -  Gentamicin: S <=1    -  Ciprofloxacin: R >2    -  Cefazolin: S 8 (MIC_CL_COM_ENTERIC_CEFAZU) For uncomplicated UTI with K. pneumoniae, E. coli, or P. mirablis: FESTUS <=16 is sensitive and FESTUS >=32 is resistant. This also predicts results for oral agents cefaclor, cefdinir, cefpodoxime, cefprozil, cefuroxime axetil, cephalexin and locarbef for uncomplicated UTI. Note that some isolates may be susceptible to these agents while testing resistant to cefazolin.    -  Ceftriaxone: S <=1 Enterobacter, Citrobacter, and Serratia may develop resistance during prolonged therapy    -  Ampicillin: R >16 These ampicillin results predict results for amoxicillin    -  Ampicillin/Sulbactam: R >16/8 Enterobacter, Citrobacter, and Serratia may develop resistance during prolonged therapy (3-4 days)    Specimen Source: .Urine Clean Catch (Midstream)    Culture Results:   >100,000 CFU/ml Escherichia coli    Organism Identification: Escherichia coli    Organism: Escherichia coli    Method Type: FESTUS

## 2020-03-23 NOTE — DISCHARGE NOTE PROVIDER - CARE PROVIDER_API CALL
Shilpi Santillan (MD)  Internal Medicine  [address] [place] [address]  Phone: [phone]  Fax: [phone]  Follow Up Time: Kirsten Islas; PhD)  Internal Medicine  [address], [address]  [place] [address]  Phone: [phone]  Fax: [phone]  Follow Up Time:

## 2020-03-23 NOTE — DISCHARGE NOTE PROVIDER - NSDCMRMEDTOKEN_GEN_ALL_CORE_FT
labetalol 300 mg oral tablet: 1 tab(s) orally 3 times a day   labetalol 300 mg oral tablet: 1 tab(s) orally every 8 hours MDD:3  NIFEdipine 60 mg oral tablet, extended release: 1 tab(s) orally once a day Basaglar KwikPen 100 units/mL subcutaneous solution: 30 unit(s) subcutaneous once a day (at bedtime)   clotrimazole 1% vaginal cream with applicator: 1 applicatorful vaginal once a day (at bedtime)  Diflucan 200 mg oral tablet: 2 tab(s) orally once a day   HumaLOG KwikPen 100 units/mL injectable solution: 10 unit(s) injectable 3 times a day (with meals)   labetalol 300 mg oral tablet: 1 tab(s) orally 3 times a day   lidocaine 2% topical gel with applicator: Apply topically to affected area once a day Basaglar KwikPen 100 units/mL subcutaneous solution: 30 unit(s) subcutaneous once a day (at bedtime)   clotrimazole 1% vaginal cream with applicator: 1 applicatorful vaginal once a day (at bedtime)  Diflucan 200 mg oral tablet: 2 tab(s) orally once a day   HumaLOG KwikPen 100 units/mL injectable solution: 10 unit(s) injectable 3 times a day (with meals)   Insulin Pen Needles, 4mm: 1 application subcutaneously 4 times a day. ** Use with insulin pen **   labetalol 300 mg oral tablet: 1 tab(s) orally 3 times a day   lidocaine 2% topical gel with applicator: Apply topically to affected area once a day

## 2020-03-23 NOTE — DISCHARGE NOTE PROVIDER - NSDCFUADDAPPT_GEN_ALL_CORE_FT
The [facility] clinic (listed under Dr. Santillan) has been made aware of your admission and will call you to set up a follow up appointment to establish a primary care doctor.     You will have to call Dr. Hannah's office (the endocrinologist) to set up a follow up appointment at your convenience.

## 2020-03-23 NOTE — DISCHARGE NOTE NURSING/CASE MANAGEMENT/SOCIAL WORK - PATIENT PORTAL LINK FT
You can access the FollowMyHealth Patient Portal offered by [facility] by registering at the following website: [email or web address]. By joining [email or web address]’s FollowMyHealth portal, you will also be able to view your health information using other applications (apps) compatible with our system.

## 2020-03-23 NOTE — PROGRESS NOTE ADULT - SUBJECTIVE AND OBJECTIVE BOX
INTERVAL HPI/OVERNIGHT EVENTS:    Patient is a 40y old  Female who presents with a chief complaint of vaginal rash r/o cellulitis (23 Mar 2020 10:35)      Pt reports the following symptoms:    CONSTITUTIONAL:  Negative fever or chills, feels well, good appetite  EYES:  Negative  blurry vision or double vision  CARDIOVASCULAR:  Negative for chest pain or palpitations  RESPIRATORY:  Negative for cough, wheezing, or SOB   GASTROINTESTINAL:  Negative for nausea, vomiting, diarrhea, constipation, or abdominal pain  GENITOURINARY:  Negative frequency, urgency or dysuria  NEUROLOGIC:  No headache, confusion, dizziness, lightheadedness    MEDICATIONS  (STANDING):  clotrimazole 1% Vaginal Cream 1 Applicatorful Vaginal at bedtime  dextrose 5%. 1000 milliLiter(s) (50 mL/Hr) IV Continuous <Continuous>  dextrose 50% Injectable 12.5 Gram(s) IV Push once  dextrose 50% Injectable 25 Gram(s) IV Push once  dextrose 50% Injectable 25 Gram(s) IV Push once  fluconAZOLE IVPB 400 milliGRAM(s) IV Intermittent every 24 hours  insulin glargine Injectable (LANTUS) 30 Unit(s) SubCutaneous at bedtime  insulin lispro (HumaLOG) corrective regimen sliding scale   SubCutaneous Before meals and at bedtime  insulin lispro Injectable (HumaLOG) 10 Unit(s) SubCutaneous three times a day before meals  labetalol 300 milliGRAM(s) Oral every 8 hours    MEDICATIONS  (PRN):  acetaminophen   Tablet .. 650 milliGRAM(s) Oral every 6 hours PRN Severe Pain (7 - 10)  dextrose 40% Gel 15 Gram(s) Oral once PRN Blood Glucose LESS THAN 70 milliGRAM(s)/deciliter  diphenhydrAMINE 50 milliGRAM(s) Oral every 4 hours PRN Rash and/or Itching  glucagon  Injectable 1 milliGRAM(s) IntraMuscular once PRN Glucose LESS THAN 70 milligrams/deciliter      PHYSICAL EXAM  Vital Signs Last 24 Hrs  T(C): 36.6 (23 Mar 2020 09:33), Max: 36.7 (22 Mar 2020 21:13)  T(F): 97.9 (23 Mar 2020 09:33), Max: 98 (22 Mar 2020 21:13)  HR: 90 (23 Mar 2020 09:33) (81 - 93)  BP: 148/70 (23 Mar 2020 09:33) (133/76 - 169/93)  BP(mean): 96 (23 Mar 2020 09:33) (96 - 96)  RR: 18 (23 Mar 2020 09:33) (16 - 18)  SpO2: 98% (23 Mar 2020 09:33) (96% - 99%)    Constitutional: wn/wd in NAD.   HEENT: NCAT, MMM, OP clear, EOMI, no proptosis or lid retraction  Neck: no thyromegaly or palpable thyroid nodules   Respiratory: lungs CTAB.  Cardiovascular: regular rhythm, normal S1 and S2, no audible murmurs, no peripheral edema  GI: soft, NT/ND, no masses/HSM appreciated.  Neurology: no tremors, DTR 2+  Skin: no visible rashes/lesions  Psychiatric: AAO x 3, normal affect/mood.    LABS:                        12.6   9.49  )-----------( 298      ( 23 Mar 2020 08:20 )             40.5     03-23    134<L>  |  98  |  8   ----------------------------<  220<H>  3.6   |  23  |  0.62    Ca    8.3<L>      23 Mar 2020 08:20  Mg     2.0         TPro  6.3  /  Alb  3.1<L>  /  TBili  0.2  /  DBili  x   /  AST  53<H>  /  ALT  38  /  AlkPhos  121<H>  0322    PT/INR - ( 21 Mar 2020 15:30 )   PT: 12.5 sec;   INR: 1.09          PTT - ( 21 Mar 2020 15:30 )  PTT:28.5 sec  Urinalysis Basic - ( 21 Mar 2020 15:33 )    Color: Yellow / Appearance: Clear / S.010 / pH: x  Gluc: x / Ketone: NEGATIVE  / Bili: Negative / Urobili: 0.2 E.U./dL   Blood: x / Protein: NEGATIVE mg/dL / Nitrite: NEGATIVE   Leuk Esterase: Trace / RBC: < 5 /HPF / WBC 5-10 /HPF   Sq Epi: x / Non Sq Epi: Moderate /HPF / Bacteria: Present /HPF          HbA1C: 12.0 % ( @ 07:11)  5.5 % ( @ 12:52)  6.0 % ( @ 11:31)    CAPILLARY BLOOD GLUCOSE      POCT Blood Glucose.: 279 mg/dL (23 Mar 2020 12:07)  POCT Blood Glucose.: 226 mg/dL (23 Mar 2020 08:56)  POCT Blood Glucose.: 223 mg/dL (22 Mar 2020 22:40)  POCT Blood Glucose.: 212 mg/dL (22 Mar 2020 19:00)  POCT Blood Glucose.: 349 mg/dL (22 Mar 2020 13:17)      Insulin Sliding Scale requirements X 24 Hours:    RADIOLOGY & ADDITIONAL TESTS:    A/P: 40y Female with history of DM type II presenting for       1.  DM -     Please continue           units lantus at bedtime  / in the morning and        units lispro with meals and lispro moderate / low dose sliding scale 4 times daily with meals and at bedtime.  Please continue consistent carbohydrate diet.      Goal FSG is   Will continue to monitor   For discharge, pt can continue    Pt can follow up at discharge with [facility] by calling  to make an appointment.   Will discuss case with     and update primary team INTERVAL HPI/OVERNIGHT EVENTS:    Patient is a 40y old  Female who presents with a chief complaint of vaginal rash r/o cellulitis (23 Mar 2020 10:35)  No acute overnight events ,pt eating well, improving , no new complain     insulin &FSG   3/22   pre breakfast  lispro 3 unit nutritional + lispro 6 unit SSI   pre lunch   lispro 8 unit nutritional + lispro 8 unit SSI   pre dinner  lispro 10 unit nutritional + lispro 4 unit SSI  chicken and potato   bed time  lantus 30 unit + lispro 4 unit SSI    pre breakfast   lispro 10 unit nutritional + lispro 4 unit SSI had eggs ,1/ bagel   pre lunch  lispro 10 unit nutritional + lispro 6 unit SSI had chicken and potato      Pt reports the following symptoms:    CONSTITUTIONAL:  Negative fever or chills, feels well, good appetite  EYES:  Negative  blurry vision or double vision  CARDIOVASCULAR:  Negative for chest pain or palpitations  RESPIRATORY:  Negative for cough, wheezing, or SOB   GASTROINTESTINAL:  Negative for nausea, vomiting, diarrhea, constipation, or abdominal pain  GENITOURINARY:  Negative frequency, urgency or dysuria  NEUROLOGIC:  No headache, confusion, dizziness, lightheadedness    MEDICATIONS  (STANDING):  clotrimazole 1% Vaginal Cream 1 Applicatorful Vaginal at bedtime  dextrose 5%. 1000 milliLiter(s) (50 mL/Hr) IV Continuous <Continuous>  dextrose 50% Injectable 12.5 Gram(s) IV Push once  dextrose 50% Injectable 25 Gram(s) IV Push once  dextrose 50% Injectable 25 Gram(s) IV Push once  fluconAZOLE IVPB 400 milliGRAM(s) IV Intermittent every 24 hours  insulin glargine Injectable (LANTUS) 30 Unit(s) SubCutaneous at bedtime  insulin lispro (HumaLOG) corrective regimen sliding scale   SubCutaneous Before meals and at bedtime  insulin lispro Injectable (HumaLOG) 10 Unit(s) SubCutaneous three times a day before meals  labetalol 300 milliGRAM(s) Oral every 8 hours    MEDICATIONS  (PRN):  acetaminophen   Tablet .. 650 milliGRAM(s) Oral every 6 hours PRN Severe Pain (7 - 10)  dextrose 40% Gel 15 Gram(s) Oral once PRN Blood Glucose LESS THAN 70 milliGRAM(s)/deciliter  diphenhydrAMINE 50 milliGRAM(s) Oral every 4 hours PRN Rash and/or Itching  glucagon  Injectable 1 milliGRAM(s) IntraMuscular once PRN Glucose LESS THAN 70 milligrams/deciliter      PHYSICAL EXAM  Vital Signs Last 24 Hrs  T(C): 36.6 (23 Mar 2020 09:33), Max: 36.7 (22 Mar 2020 21:13)  T(F): 97.9 (23 Mar 2020 09:33), Max: 98 (22 Mar 2020 21:13)  HR: 90 (23 Mar 2020 09:33) (81 - 93)  BP: 148/70 (23 Mar 2020 09:33) (133/76 - 169/93)  BP(mean): 96 (23 Mar 2020 09:33) (96 - 96)  RR: 18 (23 Mar 2020 09:33) (16 - 18)  SpO2: 98% (23 Mar 2020 09:33) (96% - 99%)    Constitutional: wn/wd in NAD.   HEENT: no proptosis or lid retraction  Neck: no thyromegaly or palpable thyroid nodules   Respiratory: lungs CTAB.  Cardiovascular: regular rhythm, normal S1 and S2.  GI: soft,no masses appreciated.    LABS:                        12.6   9.49  )-----------( 298      ( 23 Mar 2020 08:20 )             40.5     03-23    134<L>  |  98  |  8   ----------------------------<  220<H>  3.6   |  23  |  0.62    Ca    8.3<L>      23 Mar 2020 08:20  Mg     2.0     03-23    TPro  6.3  /  Alb  3.1<L>  /  TBili  0.2  /  DBili  x   /  AST  53<H>  /  ALT  38  /  AlkPhos  121<H>  03-22    PT/INR - ( 21 Mar 2020 15:30 )   PT: 12.5 sec;   INR: 1.09          PTT - ( 21 Mar 2020 15:30 )  PTT:28.5 sec  Urinalysis Basic - ( 21 Mar 2020 15:33 )    Color: Yellow / Appearance: Clear / S.010 / pH: x  Gluc: x / Ketone: NEGATIVE  / Bili: Negative / Urobili: 0.2 E.U./dL   Blood: x / Protein: NEGATIVE mg/dL / Nitrite: NEGATIVE   Leuk Esterase: Trace / RBC: < 5 /HPF / WBC 5-10 /HPF   Sq Epi: x / Non Sq Epi: Moderate /HPF / Bacteria: Present /HPF          HbA1C: 12.0 % ( @ 07:11)  5.5 % ( @ 12:52)  6.0 % ( @ 11:31)    CAPILLARY BLOOD GLUCOSE      POCT Blood Glucose.: 279 mg/dL (23 Mar 2020 12:07)  POCT Blood Glucose.: 226 mg/dL (23 Mar 2020 08:56)  POCT Blood Glucose.: 223 mg/dL (22 Mar 2020 22:40)  POCT Blood Glucose.: 212 mg/dL (22 Mar 2020 19:00)  POCT Blood Glucose.: 349 mg/dL (22 Mar 2020 13:17)      Insulin Sliding Scale requirements X 24 Hours:    RADIOLOGY & ADDITIONAL TESTS:    A/P: 40y Female with history of DM type II presenting for with a chief complaint of vaginal rash r/o cellulitis     1.  DM type 2 - uncontrolled with hyperglycemia   - Hba1c 12  -Wt 123kg with BMI 40  please --------  lantus  ----- units at night  please ---------  lispro -----  units before each meal and lispro moderate dose sliding scale four times daily with meals and at bedtime  carb consistent diet  Pt's fingerstick glucose goal is 100 to 180  CDE Lissa and nutrition consult to be obtained    Will continue to monitor     For discharge, TBD    Pt can follow up at discharge with [facility] by calling  to make an appointment.   discussed case with     and updated primary team    To Make an appointment at [address] [place] [address] for the patient, either:  1. Send a STAT task via [employer] to Meagan Jackson or Zuleyka Figueroa (office managers)   OR  2. Call supervisor's line. P: [phone] (do not give this number to patient). VM is checked periodically  In the message, specify that this is a hospital discharge follow-up, and that the appt can be made with a NP if there is no timely MD availability    REMINDERS FOR INSULIN/DIABETES SUPPLIES at DISCHARGE:  INSULIN:   Long actin/Basal Insulin: Examples: Toujeo, Basaglar, Tresiba, Lantus   Short acting/Bolus Insulin: Humalog, Admelog, Novolog  Please ensure that BOTH short acting and long acting insulin are prescribed in the same preparation (Ex: PEN vs VIAL/SOLUTION)     TESTING SUPPLIES:   All glucometer supplies should be written as generic to avoid issues with insurance. Use the free text option in sunrise prescription writer, and type in glucometer test strips, lancets, etc to order.    If sending patient home on insulin PEN, please send:   •	BD cammy insulin pen needles for use up to 4 times daily (total quantity 100)  •	Lancets for use up to 4 times daily (total quantity 100)  •	Glucometer Test strips for use up to 4 times daily (total quantity 100)  •	Alcohol swabs for use up to 4 times daily (total quantity 100)  •	Glucometer (If provided by hospital, still provide scripts for lancets, test strips, and swabs)  If sending patient home on insulin VIAL, please send:   •	Insulin syringes (6mm) - for use up to 4 times daily (total quantity 100)  •	Lancets for use up to 4 times daily (total quantity 100)  •	Generic Glucometer Test strips for use up to 4 times daily (total quantity 100)  •	Alcohol swabs for use up to 4 times daily (total quantity 100)  •	Generic Glucometer (If provided by hospital, still provide scripts for lancets, test strips, and swabs)  •	Do not specify brand for testing supplies (such as contour, freestyle, one touch etc) that way the pharmacy has the freedom to pick and change according to what the insurance dictates.  For patients without insurance:   •	Provide social work with appropriate scripts so they may obtain 1 week of samples  •	Provide with glucometer. Glucometers are located at various nursing stations, the nursing office, education, and endocrine fellows office.  •	Please make appointment with Faby Cleary NP or Hue Deras RN and BOYD Chatman at the [address] [place] [address] endocrinology clinic. They can see patients without insurance, provide appropriate samples, and assist in getting insurance coverage.     PREFERRED PHARMACY:  [facility] (located on 1st floor next to admitting)  P: [phone]  Hours: M – F 8AM – 8PM, Sat 8AM – 4PM, Sun—closed  If not using [facility], please follow up with chosen pharmacy to ensure insulin prescribed is covered. INTERVAL HPI/OVERNIGHT EVENTS:    Patient is a 40y old  Female who presents with a chief complaint of vaginal rash r/o cellulitis (23 Mar 2020 10:35)  pt seen and examined bedside,  No acute overnight events ,pt eating well, improving , no new complain     insulin &FSG   3/22   pre breakfast  lispro 3 unit nutritional + lispro 6 unit SSI   pre lunch   lispro 8 unit nutritional + lispro 8 unit SSI   pre dinner  lispro 10 unit nutritional + lispro 4 unit SSI  chicken and potato   bed time  lantus 30 unit + lispro 4 unit SSI    pre breakfast   lispro 10 unit nutritional + lispro 4 unit SSI had eggs ,1/ bagel   pre lunch  lispro 10 unit nutritional + lispro 6 unit SSI had chicken and potato      Pt reports the following symptoms:    CONSTITUTIONAL:  Negative fever or chills, feels well, good appetite  EYES:  Negative  blurry vision or double vision  CARDIOVASCULAR:  Negative for chest pain or palpitations  RESPIRATORY:  Negative for cough, wheezing, or SOB   GASTROINTESTINAL:  Negative for nausea, vomiting, diarrhea, constipation, or abdominal pain  GENITOURINARY:  Negative frequency, urgency or dysuria  NEUROLOGIC:  No headache, confusion, dizziness, lightheadedness    MEDICATIONS  (STANDING):  clotrimazole 1% Vaginal Cream 1 Applicatorful Vaginal at bedtime  dextrose 5%. 1000 milliLiter(s) (50 mL/Hr) IV Continuous <Continuous>  dextrose 50% Injectable 12.5 Gram(s) IV Push once  dextrose 50% Injectable 25 Gram(s) IV Push once  dextrose 50% Injectable 25 Gram(s) IV Push once  fluconAZOLE IVPB 400 milliGRAM(s) IV Intermittent every 24 hours  insulin glargine Injectable (LANTUS) 30 Unit(s) SubCutaneous at bedtime  insulin lispro (HumaLOG) corrective regimen sliding scale   SubCutaneous Before meals and at bedtime  insulin lispro Injectable (HumaLOG) 10 Unit(s) SubCutaneous three times a day before meals  labetalol 300 milliGRAM(s) Oral every 8 hours    MEDICATIONS  (PRN):  acetaminophen   Tablet .. 650 milliGRAM(s) Oral every 6 hours PRN Severe Pain (7 - 10)  dextrose 40% Gel 15 Gram(s) Oral once PRN Blood Glucose LESS THAN 70 milliGRAM(s)/deciliter  diphenhydrAMINE 50 milliGRAM(s) Oral every 4 hours PRN Rash and/or Itching  glucagon  Injectable 1 milliGRAM(s) IntraMuscular once PRN Glucose LESS THAN 70 milligrams/deciliter      PHYSICAL EXAM  Vital Signs Last 24 Hrs  T(C): 36.6 (23 Mar 2020 09:33), Max: 36.7 (22 Mar 2020 21:13)  T(F): 97.9 (23 Mar 2020 09:33), Max: 98 (22 Mar 2020 21:13)  HR: 90 (23 Mar 2020 09:33) (81 - 93)  BP: 148/70 (23 Mar 2020 09:33) (133/76 - 169/93)  BP(mean): 96 (23 Mar 2020 09:33) (96 - 96)  RR: 18 (23 Mar 2020 09:33) (16 - 18)  SpO2: 98% (23 Mar 2020 09:33) (96% - 99%)    Constitutional: wn/wd in NAD.   HEENT: no proptosis or lid retraction  Neck: no thyromegaly or palpable thyroid nodules   Respiratory: lungs CTAB.  Cardiovascular: regular rhythm, normal S1 and S2.  GI: soft,no masses appreciated.    LABS:                        12.6   9.49  )-----------( 298      ( 23 Mar 2020 08:20 )             40.5     03-23    134<L>  |  98  |  8   ----------------------------<  220<H>  3.6   |  23  |  0.62    Ca    8.3<L>      23 Mar 2020 08:20  Mg     2.0     03-23    TPro  6.3  /  Alb  3.1<L>  /  TBili  0.2  /  DBili  x   /  AST  53<H>  /  ALT  38  /  AlkPhos  121<H>  03-22    PT/INR - ( 21 Mar 2020 15:30 )   PT: 12.5 sec;   INR: 1.09          PTT - ( 21 Mar 2020 15:30 )  PTT:28.5 sec  Urinalysis Basic - ( 21 Mar 2020 15:33 )    Color: Yellow / Appearance: Clear / S.010 / pH: x  Gluc: x / Ketone: NEGATIVE  / Bili: Negative / Urobili: 0.2 E.U./dL   Blood: x / Protein: NEGATIVE mg/dL / Nitrite: NEGATIVE   Leuk Esterase: Trace / RBC: < 5 /HPF / WBC 5-10 /HPF   Sq Epi: x / Non Sq Epi: Moderate /HPF / Bacteria: Present /HPF          HbA1C: 12.0 % ( @ 07:11)  5.5 % ( @ 12:52)  6.0 % ( @ 11:31)    CAPILLARY BLOOD GLUCOSE      POCT Blood Glucose.: 279 mg/dL (23 Mar 2020 12:07)  POCT Blood Glucose.: 226 mg/dL (23 Mar 2020 08:56)  POCT Blood Glucose.: 223 mg/dL (22 Mar 2020 22:40)  POCT Blood Glucose.: 212 mg/dL (22 Mar 2020 19:00)  POCT Blood Glucose.: 349 mg/dL (22 Mar 2020 13:17)      Insulin Sliding Scale requirements X 24 Hours:    RADIOLOGY & ADDITIONAL TESTS:    A/P: 40y Female with history of DM type II presenting for with a chief complaint of vaginal rash r/o cellulitis     1.  DM type 2 - uncontrolled with hyperglycemia   - Hba1c 12  -Wt 123kg with BMI 40  -Cr 0.62   please lantus 40 units at night  please increase lispro 14 units before each meal and lispro moderate dose sliding scale four times daily with meals and at bedtime  carb consistent diet  Pt's fingerstick glucose goal is 100 to 180  CDE Lissa and nutrition consult to be obtained    Will continue to monitor     For discharge, please discharge the Pt on  lispro 14 units before each meal and lantus 40 units at night    Pt can follow up at discharge with [facility] by calling  to make an appointment.   discussed case with     and updated primary team    To Make an appointment at [address] [place] [address] for the patient, either:  1. Send a STAT task via [employer] to Meagan Jackson or Zuleyka Figueroa (office managers)   OR  2. Call supervisor's line. P: [phone] (do not give this number to patient). VM is checked periodically  In the message, specify that this is a hospital discharge follow-up, and that the appt can be made with a NP if there is no timely MD availability    REMINDERS FOR INSULIN/DIABETES SUPPLIES at DISCHARGE:  INSULIN:   Long actin/Basal Insulin: Examples: Toujeo, Basaglar, Tresiba, Lantus   Short acting/Bolus Insulin: Humalog, Admelog, Novolog  Please ensure that BOTH short acting and long acting insulin are prescribed in the same preparation (Ex: PEN vs VIAL/SOLUTION)     TESTING SUPPLIES:   All glucometer supplies should be written as generic to avoid issues with insurance. Use the free text option in sunrise prescription writer, and type in glucometer test strips, lancets, etc to order.    If sending patient home on insulin PEN, please send:   •	BD cammy insulin pen needles for use up to 4 times daily (total quantity 100)  •	Lancets for use up to 4 times daily (total quantity 100)  •	Glucometer Test strips for use up to 4 times daily (total quantity 100)  •	Alcohol swabs for use up to 4 times daily (total quantity 100)  •	Glucometer (If provided by hospital, still provide scripts for lancets, test strips, and swabs)  If sending patient home on insulin VIAL, please send:   •	Insulin syringes (6mm) - for use up to 4 times daily (total quantity 100)  •	Lancets for use up to 4 times daily (total quantity 100)  •	Generic Glucometer Test strips for use up to 4 times daily (total quantity 100)  •	Alcohol swabs for use up to 4 times daily (total quantity 100)  •	Generic Glucometer (If provided by hospital, still provide scripts for lancets, test strips, and swabs)  •	Do not specify brand for testing supplies (such as contour, freestyle, one touch etc) that way the pharmacy has the freedom to pick and change according to what the insurance dictates.  For patients without insurance:   •	Provide social work with appropriate scripts so they may obtain 1 week of samples  •	Provide with glucometer. Glucometers are located at various nursing stations, the nursing office, education, and endocrine fellows office.  •	Please make appointment with Faby Cleary NP or Hue Deras RN and BOYD Chatman at the [address] [place] [address] endocrinology clinic. They can see patients without insurance, provide appropriate samples, and assist in getting insurance coverage.     PREFERRED PHARMACY:  [facility] (located on 1st floor next to admitting)  P: [phone]  Hours: M – F 8AM – 8PM, Sat 8AM – 4PM, Sun—closed  If not using [facility], please follow up with chosen pharmacy to ensure insulin prescribed is covered. INTERVAL HPI/OVERNIGHT EVENTS:    Patient is a 40y old  Female who presents with a chief complaint of vaginal rash r/o cellulitis (23 Mar 2020 10:35)  pt seen and examined bedside,  No acute overnight events ,pt eating well, improving , no new complain     insulin &FSG   3/22   pre breakfast  lispro 3 unit nutritional + lispro 6 unit SSI   pre lunch   lispro 8 unit nutritional + lispro 8 unit SSI   pre dinner  lispro 10 unit nutritional + lispro 4 unit SSI  chicken and potato   bed time  lantus 30 unit + lispro 4 unit SSI    pre breakfast   lispro 10 unit nutritional + lispro 4 unit SSI had eggs ,1/ bagel   pre lunch  lispro 10 unit nutritional + lispro 6 unit SSI had chicken and potato      Pt reports the following symptoms:    CONSTITUTIONAL:  Negative fever or chills, feels well, good appetite  CARDIOVASCULAR:  Negative for chest pain or palpitations  RESPIRATORY:  Negative for cough, wheezing, or SOB   GASTROINTESTINAL:  Negative for nausea, vomiting, diarrhea, constipation, or abdominal pain  NEUROLOGIC:  No headache, confusion, dizziness, lightheadedness    MEDICATIONS  (STANDING):  clotrimazole 1% Vaginal Cream 1 Applicatorful Vaginal at bedtime  dextrose 5%. 1000 milliLiter(s) (50 mL/Hr) IV Continuous <Continuous>  dextrose 50% Injectable 12.5 Gram(s) IV Push once  dextrose 50% Injectable 25 Gram(s) IV Push once  dextrose 50% Injectable 25 Gram(s) IV Push once  fluconAZOLE IVPB 400 milliGRAM(s) IV Intermittent every 24 hours  insulin glargine Injectable (LANTUS) 30 Unit(s) SubCutaneous at bedtime  insulin lispro (HumaLOG) corrective regimen sliding scale   SubCutaneous Before meals and at bedtime  insulin lispro Injectable (HumaLOG) 10 Unit(s) SubCutaneous three times a day before meals  labetalol 300 milliGRAM(s) Oral every 8 hours    MEDICATIONS  (PRN):  acetaminophen   Tablet .. 650 milliGRAM(s) Oral every 6 hours PRN Severe Pain (7 - 10)  dextrose 40% Gel 15 Gram(s) Oral once PRN Blood Glucose LESS THAN 70 milliGRAM(s)/deciliter  diphenhydrAMINE 50 milliGRAM(s) Oral every 4 hours PRN Rash and/or Itching  glucagon  Injectable 1 milliGRAM(s) IntraMuscular once PRN Glucose LESS THAN 70 milligrams/deciliter      PHYSICAL EXAM  Vital Signs Last 24 Hrs  T(C): 36.6 (23 Mar 2020 09:33), Max: 36.7 (22 Mar 2020 21:13)  T(F): 97.9 (23 Mar 2020 09:33), Max: 98 (22 Mar 2020 21:13)  HR: 90 (23 Mar 2020 09:33) (81 - 93)  BP: 148/70 (23 Mar 2020 09:33) (133/76 - 169/93)  BP(mean): 96 (23 Mar 2020 09:33) (96 - 96)  RR: 18 (23 Mar 2020 09:33) (16 - 18)  SpO2: 98% (23 Mar 2020 09:33) (96% - 99%)    Constitutional: wn/wd in NAD.   Neck: no thyromegaly or palpable thyroid nodules   Respiratory: lungs CTAB.  Cardiovascular: regular rhythm, normal S1 and S2.  GI: soft,no masses appreciated.  Neurological - Moves extremities, no focal deficits    LABS:                        12.6   9.49  )-----------( 298      ( 23 Mar 2020 08:20 )             40.5     03-23    134<L>  |  98  |  8   ----------------------------<  220<H>  3.6   |  23  |  0.62    Ca    8.3<L>      23 Mar 2020 08:20  Mg     2.0     03-23    TPro  6.3  /  Alb  3.1<L>  /  TBili  0.2  /  DBili  x   /  AST  53<H>  /  ALT  38  /  AlkPhos  121<H>  03-22    PT/INR - ( 21 Mar 2020 15:30 )   PT: 12.5 sec;   INR: 1.09          PTT - ( 21 Mar 2020 15:30 )  PTT:28.5 sec  Urinalysis Basic - ( 21 Mar 2020 15:33 )    Color: Yellow / Appearance: Clear / S.010 / pH: x  Gluc: x / Ketone: NEGATIVE  / Bili: Negative / Urobili: 0.2 E.U./dL   Blood: x / Protein: NEGATIVE mg/dL / Nitrite: NEGATIVE   Leuk Esterase: Trace / RBC: < 5 /HPF / WBC 5-10 /HPF   Sq Epi: x / Non Sq Epi: Moderate /HPF / Bacteria: Present /HPF          HbA1C: 12.0 % ( @ 07:11)  5.5 % ( @ 12:52)  6.0 % ( @ 11:31)    CAPILLARY BLOOD GLUCOSE      POCT Blood Glucose.: 279 mg/dL (23 Mar 2020 12:07)  POCT Blood Glucose.: 226 mg/dL (23 Mar 2020 08:56)  POCT Blood Glucose.: 223 mg/dL (22 Mar 2020 22:40)  POCT Blood Glucose.: 212 mg/dL (22 Mar 2020 19:00)  POCT Blood Glucose.: 349 mg/dL (22 Mar 2020 13:17)      Insulin Sliding Scale requirements X 24 Hours:    RADIOLOGY & ADDITIONAL TESTS:    A/P: 40y Female with history of DM type II presenting for with a chief complaint of vaginal rash r/o cellulitis     1.  DM type 2 - uncontrolled with hyperglycemia   - Hba1c 12  -Wt 123kg with BMI 40  -Cr 0.62   please increase to lantus 40 units at night  please increase lispro 14 units before each meal and lispro moderate dose sliding scale four times daily with meals and at bedtime  carb consistent diet  Pt's fingerstick glucose goal is 100 to 180  CDE Lissa and nutrition consult to be obtained    Will continue to monitor     For discharge, please discharge the Pt on  lispro 14 units before each meal and lantus 40 units at night    Pt can follow up at discharge with [facility] by calling  to make an appointment.   discussed case with     and updated primary team    To Make an appointment at [address] [place] [address] for the patient, either:  1. Send a STAT task via [employer] to Meagan Jackson or Zuleyka Figueroa (office managers)   OR  2. Call supervisor's line. P: [phone] (do not give this number to patient). VM is checked periodically  In the message, specify that this is a hospital discharge follow-up, and that the appt can be made with a NP if there is no timely MD availability    REMINDERS FOR INSULIN/DIABETES SUPPLIES at DISCHARGE:  INSULIN:   Long actin/Basal Insulin: Examples: Toujeo, Basaglar, Tresiba, Lantus   Short acting/Bolus Insulin: Humalog, Admelog, Novolog  Please ensure that BOTH short acting and long acting insulin are prescribed in the same preparation (Ex: PEN vs VIAL/SOLUTION)     TESTING SUPPLIES:   All glucometer supplies should be written as generic to avoid issues with insurance. Use the free text option in [employer] prescription writer, and type in glucometer test strips, lancets, etc to order.    If sending patient home on insulin PEN, please send:   •	BD cammy insulin pen needles for use up to 4 times daily (total quantity 100)  •	Lancets for use up to 4 times daily (total quantity 100)  •	Glucometer Test strips for use up to 4 times daily (total quantity 100)  •	Alcohol swabs for use up to 4 times daily (total quantity 100)  •	Glucometer (If provided by hospital, still provide scripts for lancets, test strips, and swabs)  If sending patient home on insulin VIAL, please send:   •	Insulin syringes (6mm) - for use up to 4 times daily (total quantity 100)  •	Lancets for use up to 4 times daily (total quantity 100)  •	Generic Glucometer Test strips for use up to 4 times daily (total quantity 100)  •	Alcohol swabs for use up to 4 times daily (total quantity 100)  •	Generic Glucometer (If provided by hospital, still provide scripts for lancets, test strips, and swabs)  •	Do not specify brand for testing supplies (such as contour, freestyle, one touch etc) that way the pharmacy has the freedom to pick and change according to what the insurance dictates.  For patients without insurance:   •	Provide social work with appropriate scripts so they may obtain 1 week of samples  •	Provide with glucometer. Glucometers are located at various nursing stations, the nursing office, education, and endocrine fellows office.  •	Please make appointment with Faby Cleary NP or Hue Deras RN and BOYD Chatman at the [address] [place] [address] endocrinology clinic. They can see patients without insurance, provide appropriate samples, and assist in getting insurance coverage.     PREFERRED PHARMACY:  [facility] (located on 1st floor next to admitting)  P: [phone]  Hours: M – F 8AM – 8PM, Sat 8AM – 4PM, Sun—closed  If not using [facility], please follow up with chosen pharmacy to ensure insulin prescribed is covered.

## 2020-03-24 LAB
C TRACH RRNA SPEC QL NAA+PROBE: SIGNIFICANT CHANGE UP
N GONORRHOEA RRNA SPEC QL NAA+PROBE: SIGNIFICANT CHANGE UP

## 2020-03-25 DIAGNOSIS — L02.429 FURUNCLE OF LIMB, UNSPECIFIED: ICD-10-CM

## 2020-03-25 DIAGNOSIS — B96.20 UNSPECIFIED ESCHERICHIA COLI [E. COLI] AS THE CAUSE OF DISEASES CLASSIFIED ELSEWHERE: ICD-10-CM

## 2020-03-25 DIAGNOSIS — Z86.718 PERSONAL HISTORY OF OTHER VENOUS THROMBOSIS AND EMBOLISM: ICD-10-CM

## 2020-03-25 DIAGNOSIS — E78.5 HYPERLIPIDEMIA, UNSPECIFIED: ICD-10-CM

## 2020-03-25 DIAGNOSIS — E11.65 TYPE 2 DIABETES MELLITUS WITH HYPERGLYCEMIA: ICD-10-CM

## 2020-03-25 DIAGNOSIS — E66.01 MORBID (SEVERE) OBESITY DUE TO EXCESS CALORIES: ICD-10-CM

## 2020-03-25 DIAGNOSIS — Z98.890 OTHER SPECIFIED POSTPROCEDURAL STATES: ICD-10-CM

## 2020-03-25 DIAGNOSIS — N39.0 URINARY TRACT INFECTION, SITE NOT SPECIFIED: ICD-10-CM

## 2020-03-25 DIAGNOSIS — N76.2 ACUTE VULVITIS: ICD-10-CM

## 2020-03-25 DIAGNOSIS — E11.319 TYPE 2 DIABETES MELLITUS WITH UNSPECIFIED DIABETIC RETINOPATHY WITHOUT MACULAR EDEMA: ICD-10-CM

## 2020-03-25 DIAGNOSIS — B37.3 CANDIDIASIS OF VULVA AND VAGINA: ICD-10-CM

## 2020-03-25 DIAGNOSIS — N92.6 IRREGULAR MENSTRUATION, UNSPECIFIED: ICD-10-CM

## 2020-03-25 DIAGNOSIS — I10 ESSENTIAL (PRIMARY) HYPERTENSION: ICD-10-CM

## 2020-04-30 ENCOUNTER — APPOINTMENT (OUTPATIENT)
Dept: INTERNAL MEDICINE | Facility: CLINIC | Age: 40
End: 2020-04-30
Payer: MEDICAID

## 2020-04-30 DIAGNOSIS — T81.43XA INFECTION FOLLOWING A PROCEDURE, ORGAN AND SPACE SURGICAL SITE, INITIAL ENCTR: ICD-10-CM

## 2020-04-30 DIAGNOSIS — Z86.79 PERSONAL HISTORY OF OTHER DISEASES OF THE CIRCULATORY SYSTEM: ICD-10-CM

## 2020-04-30 PROCEDURE — 99443: CPT

## 2020-05-01 ENCOUNTER — APPOINTMENT (OUTPATIENT)
Dept: INTERNAL MEDICINE | Facility: CLINIC | Age: 40
End: 2020-05-01

## 2020-06-16 ENCOUNTER — INPATIENT (INPATIENT)
Facility: HOSPITAL | Age: 40
LOS: 3 days | Discharge: HOME HEALTH | DRG: 872 | End: 2020-06-20
Attending: INTERNAL MEDICINE | Admitting: INTERNAL MEDICINE
Payer: COMMERCIAL

## 2020-06-16 VITALS
WEIGHT: 293 LBS | SYSTOLIC BLOOD PRESSURE: 171 MMHG | RESPIRATION RATE: 18 BRPM | HEART RATE: 115 BPM | DIASTOLIC BLOOD PRESSURE: 95 MMHG | HEIGHT: 67 IN | TEMPERATURE: 98 F | OXYGEN SATURATION: 96 %

## 2020-06-16 DIAGNOSIS — N39.0 URINARY TRACT INFECTION, SITE NOT SPECIFIED: ICD-10-CM

## 2020-06-16 DIAGNOSIS — E78.5 HYPERLIPIDEMIA, UNSPECIFIED: ICD-10-CM

## 2020-06-16 DIAGNOSIS — R63.8 OTHER SYMPTOMS AND SIGNS CONCERNING FOOD AND FLUID INTAKE: ICD-10-CM

## 2020-06-16 DIAGNOSIS — B37.3 CANDIDIASIS OF VULVA AND VAGINA: ICD-10-CM

## 2020-06-16 DIAGNOSIS — Z76.89 PERSONS ENCOUNTERING HEALTH SERVICES IN OTHER SPECIFIED CIRCUMSTANCES: ICD-10-CM

## 2020-06-16 DIAGNOSIS — I10 ESSENTIAL (PRIMARY) HYPERTENSION: ICD-10-CM

## 2020-06-16 DIAGNOSIS — E66.01 MORBID (SEVERE) OBESITY DUE TO EXCESS CALORIES: ICD-10-CM

## 2020-06-16 DIAGNOSIS — E11.9 TYPE 2 DIABETES MELLITUS WITHOUT COMPLICATIONS: ICD-10-CM

## 2020-06-16 DIAGNOSIS — L03.90 CELLULITIS, UNSPECIFIED: ICD-10-CM

## 2020-06-16 DIAGNOSIS — Z98.890 OTHER SPECIFIED POSTPROCEDURAL STATES: Chronic | ICD-10-CM

## 2020-06-16 DIAGNOSIS — A41.9 SEPSIS, UNSPECIFIED ORGANISM: ICD-10-CM

## 2020-06-16 DIAGNOSIS — Z98.89 OTHER SPECIFIED POSTPROCEDURAL STATES: Chronic | ICD-10-CM

## 2020-06-16 LAB
ALBUMIN SERPL ELPH-MCNC: 3.5 G/DL — SIGNIFICANT CHANGE UP (ref 3.3–5)
ALP SERPL-CCNC: 168 U/L — HIGH (ref 40–120)
ALT FLD-CCNC: 39 U/L — SIGNIFICANT CHANGE UP (ref 10–45)
ANION GAP SERPL CALC-SCNC: 14 MMOL/L — SIGNIFICANT CHANGE UP (ref 5–17)
ANION GAP SERPL CALC-SCNC: 19 MMOL/L — HIGH (ref 5–17)
APPEARANCE UR: ABNORMAL
APTT BLD: 29 SEC — SIGNIFICANT CHANGE UP (ref 27.5–36.3)
AST SERPL-CCNC: 46 U/L — HIGH (ref 10–40)
B-OH-BUTYR SERPL-SCNC: 0.4 MMOL/L — SIGNIFICANT CHANGE UP
BASE EXCESS BLDV CALC-SCNC: -1.1 MMOL/L — SIGNIFICANT CHANGE UP
BASOPHILS # BLD AUTO: 0.04 K/UL — SIGNIFICANT CHANGE UP (ref 0–0.2)
BASOPHILS NFR BLD AUTO: 0.3 % — SIGNIFICANT CHANGE UP (ref 0–2)
BILIRUB SERPL-MCNC: 0.3 MG/DL — SIGNIFICANT CHANGE UP (ref 0.2–1.2)
BILIRUB UR-MCNC: NEGATIVE — SIGNIFICANT CHANGE UP
BUN SERPL-MCNC: 7 MG/DL — SIGNIFICANT CHANGE UP (ref 7–23)
BUN SERPL-MCNC: 9 MG/DL — SIGNIFICANT CHANGE UP (ref 7–23)
CALCIUM SERPL-MCNC: 8.2 MG/DL — LOW (ref 8.4–10.5)
CALCIUM SERPL-MCNC: 9.3 MG/DL — SIGNIFICANT CHANGE UP (ref 8.4–10.5)
CHLORIDE SERPL-SCNC: 100 MMOL/L — SIGNIFICANT CHANGE UP (ref 96–108)
CHLORIDE SERPL-SCNC: 90 MMOL/L — LOW (ref 96–108)
CO2 SERPL-SCNC: 20 MMOL/L — LOW (ref 22–31)
CO2 SERPL-SCNC: 21 MMOL/L — LOW (ref 22–31)
COLOR SPEC: YELLOW — SIGNIFICANT CHANGE UP
CREAT SERPL-MCNC: 0.56 MG/DL — SIGNIFICANT CHANGE UP (ref 0.5–1.3)
CREAT SERPL-MCNC: 0.66 MG/DL — SIGNIFICANT CHANGE UP (ref 0.5–1.3)
DIFF PNL FLD: ABNORMAL
EOSINOPHIL # BLD AUTO: 0.16 K/UL — SIGNIFICANT CHANGE UP (ref 0–0.5)
EOSINOPHIL NFR BLD AUTO: 1.1 % — SIGNIFICANT CHANGE UP (ref 0–6)
GLUCOSE BLDC GLUCOMTR-MCNC: 386 MG/DL — HIGH (ref 70–99)
GLUCOSE BLDC GLUCOMTR-MCNC: 388 MG/DL — HIGH (ref 70–99)
GLUCOSE SERPL-MCNC: 436 MG/DL — HIGH (ref 70–99)
GLUCOSE SERPL-MCNC: 518 MG/DL — CRITICAL HIGH (ref 70–99)
GLUCOSE UR QL: 500
GRAM STN FLD: SIGNIFICANT CHANGE UP
HCG SERPL-ACNC: <0 MIU/ML — SIGNIFICANT CHANGE UP
HCO3 BLDV-SCNC: 22 MMOL/L — SIGNIFICANT CHANGE UP (ref 20–27)
HCT VFR BLD CALC: 43.4 % — SIGNIFICANT CHANGE UP (ref 34.5–45)
HGB BLD-MCNC: 14 G/DL — SIGNIFICANT CHANGE UP (ref 11.5–15.5)
IMM GRANULOCYTES NFR BLD AUTO: 0.4 % — SIGNIFICANT CHANGE UP (ref 0–1.5)
INR BLD: 1.14 — SIGNIFICANT CHANGE UP (ref 0.88–1.16)
KETONES UR-MCNC: ABNORMAL MG/DL
LACTATE SERPL-SCNC: 2.7 MMOL/L — HIGH (ref 0.5–2)
LACTATE SERPL-SCNC: 3.5 MMOL/L — HIGH (ref 0.5–2)
LACTATE SERPL-SCNC: 3.6 MMOL/L — HIGH (ref 0.5–2)
LACTATE SERPL-SCNC: 4.2 MMOL/L — CRITICAL HIGH (ref 0.5–2)
LACTATE SERPL-SCNC: 5.4 MMOL/L — CRITICAL HIGH (ref 0.5–2)
LEUKOCYTE ESTERASE UR-ACNC: ABNORMAL
LYMPHOCYTES # BLD AUTO: 21.9 % — SIGNIFICANT CHANGE UP (ref 13–44)
LYMPHOCYTES # BLD AUTO: 3.06 K/UL — SIGNIFICANT CHANGE UP (ref 1–3.3)
MCHC RBC-ENTMCNC: 26.2 PG — LOW (ref 27–34)
MCHC RBC-ENTMCNC: 32.3 GM/DL — SIGNIFICANT CHANGE UP (ref 32–36)
MCV RBC AUTO: 81.3 FL — SIGNIFICANT CHANGE UP (ref 80–100)
MONOCYTES # BLD AUTO: 0.9 K/UL — SIGNIFICANT CHANGE UP (ref 0–0.9)
MONOCYTES NFR BLD AUTO: 6.5 % — SIGNIFICANT CHANGE UP (ref 2–14)
NEUTROPHILS # BLD AUTO: 9.73 K/UL — HIGH (ref 1.8–7.4)
NEUTROPHILS NFR BLD AUTO: 69.8 % — SIGNIFICANT CHANGE UP (ref 43–77)
NITRITE UR-MCNC: POSITIVE
NRBC # BLD: 0 /100 WBCS — SIGNIFICANT CHANGE UP (ref 0–0)
PCO2 BLDV: 30 MMHG — LOW (ref 41–51)
PH BLDV: 7.47 — HIGH (ref 7.32–7.43)
PH UR: 6 — SIGNIFICANT CHANGE UP (ref 5–8)
PLATELET # BLD AUTO: 351 K/UL — SIGNIFICANT CHANGE UP (ref 150–400)
PO2 BLDV: 79 MMHG — SIGNIFICANT CHANGE UP
POTASSIUM SERPL-MCNC: 3.6 MMOL/L — SIGNIFICANT CHANGE UP (ref 3.5–5.3)
POTASSIUM SERPL-MCNC: 3.8 MMOL/L — SIGNIFICANT CHANGE UP (ref 3.5–5.3)
POTASSIUM SERPL-SCNC: 3.6 MMOL/L — SIGNIFICANT CHANGE UP (ref 3.5–5.3)
POTASSIUM SERPL-SCNC: 3.8 MMOL/L — SIGNIFICANT CHANGE UP (ref 3.5–5.3)
PROT SERPL-MCNC: 7.3 G/DL — SIGNIFICANT CHANGE UP (ref 6–8.3)
PROT UR-MCNC: 30 MG/DL
PROTHROM AB SERPL-ACNC: 13.1 SEC — HIGH (ref 10–12.9)
RBC # BLD: 5.34 M/UL — HIGH (ref 3.8–5.2)
RBC # FLD: 14.6 % — HIGH (ref 10.3–14.5)
SAO2 % BLDV: 96 % — SIGNIFICANT CHANGE UP
SARS-COV-2 RNA SPEC QL NAA+PROBE: SIGNIFICANT CHANGE UP
SODIUM SERPL-SCNC: 129 MMOL/L — LOW (ref 135–145)
SODIUM SERPL-SCNC: 135 MMOL/L — SIGNIFICANT CHANGE UP (ref 135–145)
SP GR SPEC: >=1.03 — SIGNIFICANT CHANGE UP (ref 1–1.03)
SPECIMEN SOURCE: SIGNIFICANT CHANGE UP
UROBILINOGEN FLD QL: 0.2 E.U./DL — SIGNIFICANT CHANGE UP
WBC # BLD: 13.95 K/UL — HIGH (ref 3.8–10.5)
WBC # FLD AUTO: 13.95 K/UL — HIGH (ref 3.8–10.5)

## 2020-06-16 PROCEDURE — 99223 1ST HOSP IP/OBS HIGH 75: CPT | Mod: GC

## 2020-06-16 PROCEDURE — 72192 CT PELVIS W/O DYE: CPT | Mod: 26

## 2020-06-16 PROCEDURE — 99285 EMERGENCY DEPT VISIT HI MDM: CPT

## 2020-06-16 RX ORDER — ACETAMINOPHEN 500 MG
650 TABLET ORAL EVERY 6 HOURS
Refills: 0 | Status: DISCONTINUED | OUTPATIENT
Start: 2020-06-16 | End: 2020-06-16

## 2020-06-16 RX ORDER — LABETALOL HCL 100 MG
300 TABLET ORAL ONCE
Refills: 0 | Status: COMPLETED | OUTPATIENT
Start: 2020-06-16 | End: 2020-06-16

## 2020-06-16 RX ORDER — SODIUM CHLORIDE 9 MG/ML
1000 INJECTION, SOLUTION INTRAVENOUS
Refills: 0 | Status: DISCONTINUED | OUTPATIENT
Start: 2020-06-16 | End: 2020-06-16

## 2020-06-16 RX ORDER — INSULIN GLARGINE 100 [IU]/ML
35 INJECTION, SOLUTION SUBCUTANEOUS ONCE
Refills: 0 | Status: COMPLETED | OUTPATIENT
Start: 2020-06-16 | End: 2020-06-16

## 2020-06-16 RX ORDER — SODIUM CHLORIDE 9 MG/ML
1000 INJECTION INTRAMUSCULAR; INTRAVENOUS; SUBCUTANEOUS ONCE
Refills: 0 | Status: COMPLETED | OUTPATIENT
Start: 2020-06-16 | End: 2020-06-16

## 2020-06-16 RX ORDER — INSULIN GLARGINE 100 [IU]/ML
35 INJECTION, SOLUTION SUBCUTANEOUS AT BEDTIME
Refills: 0 | Status: DISCONTINUED | OUTPATIENT
Start: 2020-06-17 | End: 2020-06-17

## 2020-06-16 RX ORDER — LABETALOL HCL 100 MG
300 TABLET ORAL EVERY 12 HOURS
Refills: 0 | Status: DISCONTINUED | OUTPATIENT
Start: 2020-06-16 | End: 2020-06-16

## 2020-06-16 RX ORDER — GLUCAGON INJECTION, SOLUTION 0.5 MG/.1ML
1 INJECTION, SOLUTION SUBCUTANEOUS ONCE
Refills: 0 | Status: DISCONTINUED | OUTPATIENT
Start: 2020-06-16 | End: 2020-06-18

## 2020-06-16 RX ORDER — DEXTROSE 50 % IN WATER 50 %
15 SYRINGE (ML) INTRAVENOUS ONCE
Refills: 0 | Status: DISCONTINUED | OUTPATIENT
Start: 2020-06-16 | End: 2020-06-18

## 2020-06-16 RX ORDER — DIPHENHYDRAMINE HCL 50 MG
25 CAPSULE ORAL EVERY 4 HOURS
Refills: 0 | Status: DISCONTINUED | OUTPATIENT
Start: 2020-06-16 | End: 2020-06-18

## 2020-06-16 RX ORDER — VANCOMYCIN HCL 1 G
1000 VIAL (EA) INTRAVENOUS ONCE
Refills: 0 | Status: COMPLETED | OUTPATIENT
Start: 2020-06-16 | End: 2020-06-16

## 2020-06-16 RX ORDER — VANCOMYCIN HCL 1 G
2000 VIAL (EA) INTRAVENOUS EVERY 12 HOURS
Refills: 0 | Status: DISCONTINUED | OUTPATIENT
Start: 2020-06-17 | End: 2020-06-17

## 2020-06-16 RX ORDER — LOSARTAN POTASSIUM 100 MG/1
25 TABLET, FILM COATED ORAL DAILY
Refills: 0 | Status: DISCONTINUED | OUTPATIENT
Start: 2020-06-16 | End: 2020-06-16

## 2020-06-16 RX ORDER — CEFEPIME 1 G/1
2000 INJECTION, POWDER, FOR SOLUTION INTRAMUSCULAR; INTRAVENOUS ONCE
Refills: 0 | Status: COMPLETED | OUTPATIENT
Start: 2020-06-17 | End: 2020-06-17

## 2020-06-16 RX ORDER — DEXTROSE 50 % IN WATER 50 %
25 SYRINGE (ML) INTRAVENOUS ONCE
Refills: 0 | Status: DISCONTINUED | OUTPATIENT
Start: 2020-06-16 | End: 2020-06-18

## 2020-06-16 RX ORDER — ACETAMINOPHEN 500 MG
650 TABLET ORAL EVERY 6 HOURS
Refills: 0 | Status: DISCONTINUED | OUTPATIENT
Start: 2020-06-16 | End: 2020-06-18

## 2020-06-16 RX ORDER — HYDROMORPHONE HYDROCHLORIDE 2 MG/ML
0.5 INJECTION INTRAMUSCULAR; INTRAVENOUS; SUBCUTANEOUS ONCE
Refills: 0 | Status: DISCONTINUED | OUTPATIENT
Start: 2020-06-16 | End: 2020-06-16

## 2020-06-16 RX ORDER — FAMOTIDINE 10 MG/ML
20 INJECTION INTRAVENOUS ONCE
Refills: 0 | Status: COMPLETED | OUTPATIENT
Start: 2020-06-16 | End: 2020-06-16

## 2020-06-16 RX ORDER — MORPHINE SULFATE 50 MG/1
4 CAPSULE, EXTENDED RELEASE ORAL ONCE
Refills: 0 | Status: DISCONTINUED | OUTPATIENT
Start: 2020-06-16 | End: 2020-06-16

## 2020-06-16 RX ORDER — DIPHENHYDRAMINE HCL 50 MG
25 CAPSULE ORAL ONCE
Refills: 0 | Status: COMPLETED | OUTPATIENT
Start: 2020-06-16 | End: 2020-06-16

## 2020-06-16 RX ORDER — LABETALOL HCL 100 MG
10 TABLET ORAL ONCE
Refills: 0 | Status: COMPLETED | OUTPATIENT
Start: 2020-06-16 | End: 2020-06-16

## 2020-06-16 RX ORDER — GABAPENTIN 400 MG/1
300 CAPSULE ORAL
Refills: 0 | Status: DISCONTINUED | OUTPATIENT
Start: 2020-06-16 | End: 2020-06-18

## 2020-06-16 RX ORDER — VANCOMYCIN HCL 1 G
2000 VIAL (EA) INTRAVENOUS ONCE
Refills: 0 | Status: COMPLETED | OUTPATIENT
Start: 2020-06-16 | End: 2020-06-17

## 2020-06-16 RX ORDER — LIDOCAINE 4 G/100G
1 CREAM TOPICAL
Refills: 0 | Status: DISCONTINUED | OUTPATIENT
Start: 2020-06-16 | End: 2020-06-18

## 2020-06-16 RX ORDER — INSULIN LISPRO 100/ML
30 VIAL (ML) SUBCUTANEOUS
Refills: 0 | Status: DISCONTINUED | OUTPATIENT
Start: 2020-06-16 | End: 2020-06-18

## 2020-06-16 RX ORDER — HYDROMORPHONE HYDROCHLORIDE 2 MG/ML
2 INJECTION INTRAMUSCULAR; INTRAVENOUS; SUBCUTANEOUS EVERY 6 HOURS
Refills: 0 | Status: DISCONTINUED | OUTPATIENT
Start: 2020-06-16 | End: 2020-06-16

## 2020-06-16 RX ORDER — VANCOMYCIN HCL 1 G
VIAL (EA) INTRAVENOUS
Refills: 0 | Status: COMPLETED | OUTPATIENT
Start: 2020-06-16 | End: 2020-06-17

## 2020-06-16 RX ORDER — HYDROXYZINE HCL 10 MG
25 TABLET ORAL ONCE
Refills: 0 | Status: COMPLETED | OUTPATIENT
Start: 2020-06-16 | End: 2020-06-16

## 2020-06-16 RX ORDER — DIPHENHYDRAMINE HCL 50 MG
50 CAPSULE ORAL ONCE
Refills: 0 | Status: COMPLETED | OUTPATIENT
Start: 2020-06-16 | End: 2020-06-16

## 2020-06-16 RX ORDER — CEFEPIME 1 G/1
2000 INJECTION, POWDER, FOR SOLUTION INTRAMUSCULAR; INTRAVENOUS EVERY 12 HOURS
Refills: 0 | Status: DISCONTINUED | OUTPATIENT
Start: 2020-06-17 | End: 2020-06-17

## 2020-06-16 RX ORDER — DEXTROSE 50 % IN WATER 50 %
12.5 SYRINGE (ML) INTRAVENOUS ONCE
Refills: 0 | Status: DISCONTINUED | OUTPATIENT
Start: 2020-06-16 | End: 2020-06-18

## 2020-06-16 RX ORDER — IBUPROFEN 200 MG
400 TABLET ORAL EVERY 6 HOURS
Refills: 0 | Status: DISCONTINUED | OUTPATIENT
Start: 2020-06-16 | End: 2020-06-17

## 2020-06-16 RX ORDER — CEFTRIAXONE 500 MG/1
1000 INJECTION, POWDER, FOR SOLUTION INTRAMUSCULAR; INTRAVENOUS EVERY 24 HOURS
Refills: 0 | Status: DISCONTINUED | OUTPATIENT
Start: 2020-06-16 | End: 2020-06-16

## 2020-06-16 RX ORDER — ENOXAPARIN SODIUM 100 MG/ML
40 INJECTION SUBCUTANEOUS EVERY 12 HOURS
Refills: 0 | Status: DISCONTINUED | OUTPATIENT
Start: 2020-06-16 | End: 2020-06-18

## 2020-06-16 RX ORDER — FLUCONAZOLE 150 MG/1
150 TABLET ORAL ONCE
Refills: 0 | Status: COMPLETED | OUTPATIENT
Start: 2020-06-16 | End: 2020-06-16

## 2020-06-16 RX ORDER — INSULIN HUMAN 100 [IU]/ML
10 INJECTION, SOLUTION SUBCUTANEOUS ONCE
Refills: 0 | Status: COMPLETED | OUTPATIENT
Start: 2020-06-16 | End: 2020-06-16

## 2020-06-16 RX ORDER — LABETALOL HCL 100 MG
600 TABLET ORAL
Refills: 0 | Status: DISCONTINUED | OUTPATIENT
Start: 2020-06-16 | End: 2020-06-16

## 2020-06-16 RX ORDER — CEFEPIME 1 G/1
1000 INJECTION, POWDER, FOR SOLUTION INTRAMUSCULAR; INTRAVENOUS ONCE
Refills: 0 | Status: COMPLETED | OUTPATIENT
Start: 2020-06-16 | End: 2020-06-16

## 2020-06-16 RX ORDER — INSULIN GLARGINE 100 [IU]/ML
30 INJECTION, SOLUTION SUBCUTANEOUS ONCE
Refills: 0 | Status: COMPLETED | OUTPATIENT
Start: 2020-06-16 | End: 2020-06-16

## 2020-06-16 RX ORDER — INSULIN LISPRO 100/ML
VIAL (ML) SUBCUTANEOUS
Refills: 0 | Status: DISCONTINUED | OUTPATIENT
Start: 2020-06-16 | End: 2020-06-18

## 2020-06-16 RX ORDER — SODIUM CHLORIDE 9 MG/ML
1000 INJECTION, SOLUTION INTRAVENOUS
Refills: 0 | Status: DISCONTINUED | OUTPATIENT
Start: 2020-06-16 | End: 2020-06-18

## 2020-06-16 RX ORDER — FLUCONAZOLE 150 MG/1
150 TABLET ORAL
Refills: 0 | Status: CANCELLED | OUTPATIENT
Start: 2020-06-19 | End: 2020-06-18

## 2020-06-16 RX ADMIN — Medication 10: at 22:05

## 2020-06-16 RX ADMIN — SODIUM CHLORIDE 1000 MILLILITER(S): 9 INJECTION INTRAMUSCULAR; INTRAVENOUS; SUBCUTANEOUS at 12:37

## 2020-06-16 RX ADMIN — ENOXAPARIN SODIUM 40 MILLIGRAM(S): 100 INJECTION SUBCUTANEOUS at 22:05

## 2020-06-16 RX ADMIN — SODIUM CHLORIDE 1000 MILLILITER(S): 9 INJECTION INTRAMUSCULAR; INTRAVENOUS; SUBCUTANEOUS at 16:19

## 2020-06-16 RX ADMIN — INSULIN HUMAN 10 UNIT(S): 100 INJECTION, SOLUTION SUBCUTANEOUS at 10:48

## 2020-06-16 RX ADMIN — MORPHINE SULFATE 4 MILLIGRAM(S): 50 CAPSULE, EXTENDED RELEASE ORAL at 09:29

## 2020-06-16 RX ADMIN — HYDROMORPHONE HYDROCHLORIDE 0.5 MILLIGRAM(S): 2 INJECTION INTRAMUSCULAR; INTRAVENOUS; SUBCUTANEOUS at 10:48

## 2020-06-16 RX ADMIN — CEFEPIME 1000 MILLIGRAM(S): 1 INJECTION, POWDER, FOR SOLUTION INTRAMUSCULAR; INTRAVENOUS at 11:53

## 2020-06-16 RX ADMIN — Medication 50 MILLIGRAM(S): at 13:02

## 2020-06-16 RX ADMIN — SODIUM CHLORIDE 2000 MILLILITER(S): 9 INJECTION INTRAMUSCULAR; INTRAVENOUS; SUBCUTANEOUS at 23:46

## 2020-06-16 RX ADMIN — HYDROMORPHONE HYDROCHLORIDE 2 MILLIGRAM(S): 2 INJECTION INTRAMUSCULAR; INTRAVENOUS; SUBCUTANEOUS at 20:22

## 2020-06-16 RX ADMIN — SODIUM CHLORIDE 1000 MILLILITER(S): 9 INJECTION INTRAMUSCULAR; INTRAVENOUS; SUBCUTANEOUS at 09:30

## 2020-06-16 RX ADMIN — Medication 25 MILLIGRAM(S): at 23:03

## 2020-06-16 RX ADMIN — SODIUM CHLORIDE 1000 MILLILITER(S): 9 INJECTION INTRAMUSCULAR; INTRAVENOUS; SUBCUTANEOUS at 13:37

## 2020-06-16 RX ADMIN — SODIUM CHLORIDE 1000 MILLILITER(S): 9 INJECTION INTRAMUSCULAR; INTRAVENOUS; SUBCUTANEOUS at 12:30

## 2020-06-16 RX ADMIN — SODIUM CHLORIDE 180 MILLILITER(S): 9 INJECTION, SOLUTION INTRAVENOUS at 23:03

## 2020-06-16 RX ADMIN — Medication 250 MILLIGRAM(S): at 10:00

## 2020-06-16 RX ADMIN — INSULIN GLARGINE 30 UNIT(S): 100 INJECTION, SOLUTION SUBCUTANEOUS at 18:36

## 2020-06-16 RX ADMIN — SODIUM CHLORIDE 1000 MILLILITER(S): 9 INJECTION INTRAMUSCULAR; INTRAVENOUS; SUBCUTANEOUS at 09:29

## 2020-06-16 RX ADMIN — CEFTRIAXONE 100 MILLIGRAM(S): 500 INJECTION, POWDER, FOR SOLUTION INTRAMUSCULAR; INTRAVENOUS at 20:22

## 2020-06-16 RX ADMIN — Medication 25 MILLIGRAM(S): at 20:49

## 2020-06-16 RX ADMIN — CEFEPIME 100 MILLIGRAM(S): 1 INJECTION, POWDER, FOR SOLUTION INTRAMUSCULAR; INTRAVENOUS at 11:23

## 2020-06-16 RX ADMIN — Medication 25 MILLIGRAM(S): at 13:30

## 2020-06-16 RX ADMIN — FAMOTIDINE 20 MILLIGRAM(S): 10 INJECTION INTRAVENOUS at 13:29

## 2020-06-16 RX ADMIN — Medication 10 MILLIGRAM(S): at 10:49

## 2020-06-16 RX ADMIN — Medication 600 MILLIGRAM(S): at 20:22

## 2020-06-16 RX ADMIN — HYDROMORPHONE HYDROCHLORIDE 0.5 MILLIGRAM(S): 2 INJECTION INTRAMUSCULAR; INTRAVENOUS; SUBCUTANEOUS at 12:37

## 2020-06-16 RX ADMIN — HYDROMORPHONE HYDROCHLORIDE 0.5 MILLIGRAM(S): 2 INJECTION INTRAMUSCULAR; INTRAVENOUS; SUBCUTANEOUS at 10:11

## 2020-06-16 RX ADMIN — FLUCONAZOLE 150 MILLIGRAM(S): 150 TABLET ORAL at 14:31

## 2020-06-16 RX ADMIN — LOSARTAN POTASSIUM 25 MILLIGRAM(S): 100 TABLET, FILM COATED ORAL at 22:05

## 2020-06-16 NOTE — H&P ADULT - HISTORY OF PRESENT ILLNESS
40F  with last delivery in 2019 c/b post-operative wound infection of abdomen requiring IR drainage, with hx of multiple abdominal washouts for SSI requiring PICC line for abx & hernia repair w/ mesh numerous times (Dr. Hurtado) and other PMH of DVT, uncontrolled DM2, HTN, HLD, admitted to [facility] in March for vaginal yeast infection discharged on Fluconazole, who now presents with 2 week of worsening vaginal and perianal pain, itching, copious thick white discharge and dysuria. She has tried over the counter antifungal creams as well as lidocaine spray with some relief. She reports her BP and blood glucose have not been well controlled which she attributes to her pain. Patient denies fever, chills, chest pain, SOB, abdominal pain, nausea, vomiting, vaginal bleeding.    In ED: T 98.3, , /95, RR 18, SpO2 96% on RA.   Labs: WBC 13.95, lactate 5.4, Na 129, CO2 20, AG 19, glucose 518, negative BHB, VBG with normal pH.    UA positive for specific gravity >1.030, nitrites, trace leuk esterase, >10 WBC, +bacteria.  COVID negative.    Imaging: CTAP w/ 3 cm area of infiltration of subcutaneous fat in the inferior left buttock consistent with superficial phlegmonous change or perhaps early abscess, probably related to the dermal appendages. Does not appear to be drainable at this time. No fistulous connection to the anal canal.    Interventions:  Fluconazole 150mg PO, Vancomycin/Cefepime, developed itching and therefore given Benadryl and Atarax, Dilaudid 0.5mg IVP x3, Morphine 4mg IV, Labetalol 10mg IVP, insulin 10u and 4L NS.   Consults: GYN; ICU, Surgery Pt is a 40F  with last delivery in 2019 c/b post-operative wound infection of abdomen requiring IR drainage, with hx of multiple abdominal washouts for SSI requiring PICC line for abx & hernia repair w/ mesh numerous times (Dr. Hurtado) and other PMH of DVT, uncontrolled DM2, HTN, HLD, admitted to [facility] in March for vaginal yeast infection discharged on Fluconazole, who now presents with 2 week of worsening vaginal and perianal pain, itching, copious thick white discharge and dysuria. She has tried over the counter antifungal creams as well as lidocaine spray with some relief. She reports her BP has been elevated at home and blood glucose has been poorly controlled 2/2 her pain. Patient otherwise denies fever/chills, chest pain, SOB, nausea, vomiting, vaginal bleeding.    In ED: T 98.3, , /95, RR 18, SpO2 96% on RA.   Labs: WBC 13.95, lactate 5.4, Na 129, CO2 20, AG 19, glucose 518, negative BHB, VBG with normal pH.    UA positive for specific gravity >1.030, nitrites, trace leuk esterase, >10 WBC, +bacteria.  COVID negative.    Imaging: CTAP w/ 3 cm area of infiltration of subcutaneous fat in the inferior left buttock consistent with superficial phlegmonous change or perhaps early abscess, probably related to the dermal appendages. Does not appear to be drainable at this time. No fistulous connection to the anal canal.    Interventions:  Fluconazole 150mg PO, Vancomycin/Cefepime, developed itching and therefore given Benadryl and Atarax, Dilaudid 0.5mg IVP x3, Morphine 4mg IV, Labetalol 10mg IVP, insulin 10u and 4L NS.   Consults: GYN; ICU Pt is a 40F  with last delivery in 2019 c/b post-operative wound infection of abdomen requiring IR drainage, with hx of multiple abdominal washouts for SSI requiring PICC line for abx & hernia repair w/ mesh numerous times (Dr. Hurtado) and other PMH of DVT, uncontrolled DM2, HTN, HLD, admitted to [facility] in March for vaginal yeast infection discharged on Fluconazole, who now presents with 2 week of worsening vaginal and perianal pain, itching, copious thick white discharge and dysuria. She has tried over the counter antifungal creams as well as lidocaine spray with some relief. She reports her BP has been elevated at home and blood glucose has been poorly controlled 2/2 her pain. Patient otherwise denies fever/chills, chest pain, SOB, nausea, vomiting, vaginal bleeding.    In ED: T 98.3, , /95, RR 18, SpO2 96% on RA.   Labs: WBC 13.95, lactate 5.4, Na 129, CO2 20, AG 19, glucose 518, negative BHB, VBG with normal pH.    UA positive for specific gravity >1.030, nitrites, trace leuk esterase, >10 WBC, +bacteria.  COVID negative.    Imaging: CTAP w/ 3 cm area of infiltration of subcutaneous fat in the inferior left buttock consistent with superficial phlegmonous change or perhaps early abscess, probably related to the dermal appendages. Does not appear to be drainable at this time. No fistulous connection to the anal canal.    Interventions:  Fluconazole 150mg PO, Vancomycin/Cefepime, developed itching and therefore given Benadryl and Atarax, Dilaudid 0.5mg IVP x3, Morphine 4mg IV, Labetalol 10mg IVP, insulin 10u and 4L NS.   Consults: GYN; ICU Pt is a 40F  with last delivery in 2019 c/b post-operative wound infection of abdomen requiring IR drainage, with hx of multiple abdominal washouts for SSI requiring PICC line for abx & hernia repair w/ mesh numerous times (Dr. Hurtado) and other PMH of DVT, uncontrolled DM2, HTN, HLD, admitted to [facility] in March for vaginal yeast infection discharged on 10 days of PO fluconazole, who now presents with 2 week of worsening vaginal and perianal pain, itching, copious thick white discharge and dysuria. Pt states that her symptoms briefly resolved for one week after completing the course of fluconazole, then she developed mild redness and itching. 2 weeks ago she noticed pressure and burning with urination. She has tried over the counter antifungal creams as well as lidocaine spray with some relief. She also reports her BP has been elevated at home and blood glucose has been poorly controlled 2/2 her pain. Patient otherwise denies fever/chills, chest pain, SOB, nausea, vomiting, vaginal bleeding.    In ED: T 98.3, , /95, RR 18, SpO2 96% on RA.   Labs: WBC 13.95, lactate 5.4, Na 129, CO2 20, AG 19, glucose 518, negative BHB, VBG with normal pH.    UA positive for specific gravity >1.030, nitrites, trace leuk esterase, >10 WBC, +bacteria.  COVID negative.    Imaging: CTAP w/ 3 cm area of infiltration of subcutaneous fat in the inferior left buttock consistent with superficial phlegmonous change or perhaps early abscess, probably related to the dermal appendages. Does not appear to be drainable at this time. No fistulous connection to the anal canal.    Interventions:  Fluconazole 150mg PO, Vancomycin/Cefepime, developed itching and therefore given Benadryl and Atarax, Dilaudid 0.5mg IVP x3, Morphine 4mg IV, Labetalol 10mg IVP, insulin 10u and 4L NS.   Consults: GYN; ICU Pt is a 40F with PMH uncontrolled DM2, HTN, HLD, recent admission for candida vulvovaginitis who presents 6/16 with 2 weeks of worsening vaginal and perianal pain, itching, copious thick white discharge and dysuria. Pt states that after last discharge, her symptoms briefly resolved after completing the course of fluconazole in March, then they recurred. 2 weeks ago pt noticed pressure and burning with urination, rash around groin. Rash progressed to skin peeling and boils that ruptured with some bloody oozing. She has tried over the counter antifungal creams as well as lidocaine spray with some relief. She also reports her BP has been elevated at home and blood glucose has been poorly controlled 2/2 her pain. Patient otherwise denies fever/chills, chest pain, SOB, nausea, vomiting, vaginal bleeding.    In ED: T 98.3, , /95, RR 18, SpO2 96% on RA.   Labs: WBC 13.95, lactate 5.4, Na 129, CO2 20, AG 19, glucose 518, negative BHB, VBG with normal pH.    UA positive for specific gravity >1.030, nitrites, trace leuk esterase, >10 WBC, +bacteria.  COVID negative.    Imaging: CTAP w/ 3 cm area of infiltration of subcutaneous fat in the inferior left buttock consistent with superficial phlegmonous change or perhaps early abscess, probably related to the dermal appendages. Does not appear to be drainable at this time. No fistulous connection to the anal canal.    Interventions:  Fluconazole 150mg PO, Vancomycin/Cefepime, developed itching and therefore given Benadryl and Atarax, Dilaudid 0.5mg IVP x3, Morphine 4mg IV, Labetalol 10mg IVP, insulin 10u and 4L NS.   Consults: GYN, ICU, surgery

## 2020-06-16 NOTE — ED PROVIDER NOTE - PHYSICAL EXAMINATION
Vitals reviewed  Gen: appears uncomfortable, nad, speaking in full sentences  Skin: wwp, no rash/lesions  HEENT: ncat, eomi, mmm  CV: rrr, no audible m/r/g  Resp: symmetrical expansion, ctab, no w/r/r  Abd: nondistended, soft/nt  Ext: FROM throughout, no peripheral edema  Neuro: alert/oriented, no focal deficits, steady gait Vitals reviewed  Gen: appears uncomfortable but nad, speaking in full sentences  Skin: ext wwp, see   HEENT: ncat, eomi, mmm  CV: tachycardia, regular rhythm, no audible m/r/g  Resp: symmetrical expansion, ctab, no w/r/r  Abd: obese, nondistended, soft/nt, no rebound/guarding, no cvat  : RN Faby and extern Jonathan present- significant eyrthema/macerated skin from mons pubic extending into perianal region, diffuse ttp but predominantly tender to anal region w/ induration and increased warmth, no palpable crepitus, unable to perform speculum exam or CHANCE 2/2 pain   Ext: FROM throughout, no peripheral edema  Neuro: alert/oriented, no focal deficits, steady gait

## 2020-06-16 NOTE — H&P ADULT - PROBLEM SELECTOR PLAN 5
Patient with known history of hyperlipidemia. Not on outpatient statin.   - f/u lipid profile Patient with BMI 46.2.   - Patient education.   - Nutrition consult. Patient with BMI 46.2.   - Patient education  - Nutrition consult Patient with insulin dependent diabetes. HgA1c 12, poorly controlled. Patient on home Lantus 35 units at bedtime and Lispro 30 units TID.   - received Lantus 30u in ED  - Lispro 30u TID  - Insulin sliding scale  - Lantus 35 units at bedtime starting 6/17  - monitor fingersticks  - Endocrine consult in AM    #Proteinuria  - Likely 2/2 poorly controlled DM  - f/u micoralbumin/Cr  - Management of DM as above

## 2020-06-16 NOTE — H&P ADULT - PROBLEM SELECTOR PLAN 4
Patient with BMI 46.2.   - Patient education.   - Nutrition consult. Patient with insulin dependent diabetes. HgA1c 12, poorly controlled. Patient on home Lantus 35 units at bedtime and Lispro 30 units TID.   - received Lantus 30 units in ED  - Lispo 30 units TID  - sliding scale insulin inpatient   - Starting 6/17: Lantus 35 units at bedtime   - monitor fingersticks Patient with insulin dependent diabetes. HgA1c 12, poorly controlled. Patient on home Lantus 35 units at bedtime and Lispro 30 units TID.   - received Lantus 30u in ED  - Lispro 30u TID  - Insulin sliding scale  - Lantus 35 units at bedtime starting 6/17  - monitor fingersticks  - Endocrine consult in AM Patient with insulin dependent diabetes. HgA1c 12, poorly controlled. Patient on home Lantus 35 units at bedtime and Lispro 30 units TID.   - received Lantus 30u in ED  - Lispro 30u TID  - Insulin sliding scale  - Lantus 35 units at bedtime starting 6/17  - monitor fingersticks  - Endocrine consult in AM    #Proteinuria  - Likely 2/2 poorly controlled DM  - Management of DM as above Patient presents with pain, burning with urination x2 weeks. Meeting severe sepsis criteria on admission. UA positive for specific gravity >1.030, nitrites, trace leuk esterase, >10 WBC, +bacteria.   - c/w cefepime 2g q12 (pre-treat w/ benadryl)  - f/u blood culture  - f/u urine culture

## 2020-06-16 NOTE — H&P ADULT - PROBLEM SELECTOR PLAN 9
DISCHARGE PLANNING:  Medical readiness goals: endo consult   Anticipated d/c:  PCP:     Pharmacy: DISCHARGE PLANNING:  Medical readiness goals: endo consult   PCP: Dr. Grace  Pharmacy: [facility] F: s/p 4L NS  E: replete to K>4, Mg>2; Phos >3  N: Dash/TLC; CC  VTE Prophylaxis: Lovenox SubQ BID  C: Full Code  D: MANDI

## 2020-06-16 NOTE — CONSULT NOTE ADULT - ASSESSMENT
40F  with last delivery in 2019 c/b post-operative wound infection of abdomen requiring IR drainage, with hx of multiple abdominal washouts for SSI requiring PICC line for abx & hernia repair w/ mesh numerous times (Dr. Hurtado) and other PMH of DVT, uncontrolled DM2, HTN, HLD, admitted to [facility] in March for vaginal yeast infection discharged on Fluconazole, who now presents with 2 week of worsening vaginal and perianal pain, itching, copious thick white discharge and dysuria.     #Severe Sepsis- meeting 2/4 SIRS criteria with tachycardia to HR 110s and leukocytosis to WBC 13 with lactate elevation to 5. No fevers or hypotension, rather patient hypertensive on presentation.  Source likely severe vulvovaginitis candidiasis vs UTI.  Patient was recently admitted in March for vulvovaginitis and discharged on Fluconazole, for which she was compliant and noticed brief improvement in symptoms. However, shortly afterwards she noted worsening pain in vulva, vaginal discharge and dysuria which was not responding to over the counter antifungal medications.  This is likely in the setting of poorly controlled DM with ? compliance with diabetic medications and -500s.  - s/p Fluconazole, Cefepime and Vancomycin in ED  - s/p 3L NS with repeat lactate 3.5, given additional 1L NS to meet 30cc/kg (~4L); can continue on maintenance fluids   - repeat lactate after 4th L NS; patient mentating and perfusing on exam w/ good UOP  - continue Fluconazole per GYN recommendations, patient will need prolonged course  - can also use topical antifungal creams (nystatin powder) and keep area dry and clean  - continue broad-spectrum antibiotics for UTI; may need to pretreat with benadryl  - optimize glycemic control     #Candida Vaginitis w/satellite lesions  - treatment per GYN recommendations    #Diabetes Mellitus with sig Hyperglycemia   - call pharmacy for medication reconciliation (pt takes insulin regimen at home)  - start on wt based basal/bolus regimen w MISS  - diabetic education, diabetic diet  - f/u A1c    #HTN- hypertensive likely 2/2 missing dose of home Labetalol. Responded to 10mg IV Labetalol in ED. May also be related to pain.   - resume home medications (med rec)  - pain control 40F  with last delivery in 2019 c/b post-operative wound infection of abdomen requiring IR drainage, with hx of multiple abdominal washouts for SSI requiring PICC line for abx & hernia repair w/ mesh numerous times (Dr. Hurtado) and other PMH of DVT, uncontrolled DM2, HTN, HLD, admitted to [facility] in March for vaginal yeast infection discharged on Fluconazole, who now presents with 2 week of worsening vaginal and perianal pain, itching, copious thick white discharge and dysuria.     #Severe Sepsis- meeting 2/4 SIRS criteria with tachycardia to HR 110s and leukocytosis to WBC 13 with lactate elevation to 5. No fevers or hypotension, rather patient hypertensive on presentation.  Source likely severe vulvovaginitis candidiasis vs UTI.  Patient was recently admitted in March for vulvovaginitis and discharged on Fluconazole, for which she was compliant and noticed brief improvement in symptoms. However, shortly afterwards she noted worsening pain in vulva, vaginal discharge and dysuria which was not responding to over the counter antifungal medications.  This is likely in the setting of poorly controlled DM with ? compliance with diabetic medications and -500s.  - s/p Fluconazole, Cefepime and Vancomycin in ED  - s/p 3L NS with repeat lactate 3.5, given additional 1L NS to meet 30cc/kg (~4L); can continue on maintenance fluids   - repeat lactate after 4th L NS; patient mentating and perfusing on exam w/ good UOP  - continue Fluconazole per GYN recommendations, patient will need prolonged course  - can also use topical antifungal creams (nystatin powder) and keep area dry and clean  - continue broad-spectrum antibiotics for UTI; may need to pretreat with benadryl  - optimize glycemic control   - surgery consult for evaluation on seen on CT on L buttocks that could represent phlegmon    #Candida Vaginitis w/satellite lesions  - treatment per GYN recommendations    #Diabetes Mellitus with sig Hyperglycemia   - call pharmacy for medication reconciliation (pt takes insulin regimen at home)  - start on wt based basal/bolus regimen w MISS; Lantus 30u now and 10 pre-meal   - endocrine consult   - diabetic education, diabetic diet  - f/u A1c    #HTN- hypertensive likely 2/2 missing dose of home Labetalol. Responded to 10mg IV Labetalol in ED. May also be related to pain.   - resume home medications (med rec)  - pain control     #Hyponatremia- likely hypovolemic, now resolved.     Discussed with Dr. Jay. No need for telemetry. Reconsult with questions. 40F  with last delivery in 2019 c/b post-operative wound infection of abdomen requiring IR drainage, with hx of multiple abdominal washouts for SSI requiring PICC line for abx & hernia repair w/ mesh numerous times (Dr. Hurtado) and other PMH of DVT, uncontrolled DM2, HTN, HLD, admitted to [facility] in March for vaginal yeast infection discharged on Fluconazole, who now presents with 2 week of worsening vaginal and perianal pain, itching, copious thick white discharge and dysuria.     #Severe Sepsis- meeting 2/4 SIRS criteria with tachycardia to HR 110s and leukocytosis to WBC 13 with lactate elevation to 5. No fevers or hypotension, rather patient hypertensive on presentation.  Source likely severe vulvovaginitis candidiasis vs UTI.  Patient was recently admitted in March for vulvovaginitis and discharged on Fluconazole, for which she was compliant and noticed brief improvement in symptoms. However, shortly afterwards she noted worsening pain in vulva, vaginal discharge and dysuria which was not responding to over the counter antifungal medications.  This is likely in the setting of poorly controlled DM with ? compliance with diabetic medications and -500s.  - s/p Fluconazole, Cefepime and Vancomycin in ED  - s/p 3L NS with repeat lactate 3.5, given additional 1L NS to meet 30cc/kg (~4L); can continue on maintenance fluids   - repeat lactate after 4th L NS; patient mentating and perfusing on exam w/ good UOP  - continue Fluconazole per GYN recommendations, patient will need prolonged course  - can also use topical antifungal creams (nystatin powder) and keep area dry and clean  - continue broad-spectrum antibiotics for UTI; may need to pretreat with benadryl  - optimize glycemic control   - surgery consult for evaluation on seen on CT on L buttocks that could represent phlegmon  - f/u  Ucx, Bcx, vaginitis panel, cervical swab    #Esmer Vaginitis w/satellite lesions  - treatment per GYN recommendations    #Diabetes Mellitus with sig Hyperglycemia   - call pharmacy for medication reconciliation (pt takes insulin regimen at home)  - start on wt based basal/bolus regimen w MISS; Lantus 30u now and 10 pre-meal   - endocrine consult   - diabetic education, diabetic diet  - f/u A1c    #HTN- hypertensive likely 2/2 missing dose of home Labetalol. Responded to 10mg IV Labetalol in ED. May also be related to pain.   - resume home medications (med rec)  - pain control     #Hyponatremia- likely hypovolemic, now resolved.     Discussed with Dr. Jay. No need for telemetry. Reconsult with questions.

## 2020-06-16 NOTE — H&P ADULT - PROBLEM SELECTOR PLAN 6
Patient with known history of hypertension, on home labetaolol 300mg BID (per pharmacy should be 600mg BID).  - c/w home labetalol 300mg BID  - monitor BP Patient with known history of hypertension, on home labetalol 600mg BID  - /95 in ED  - c/w home labetalol 600mg BID  - monitor BP Patient with known history of hypertension, on home labetalol 600mg TID  - /95 in ED  - c/w home labetalol 600mg TID  - Start losartan 25mg daily tomorrow  - monitor BP Patient with known history of hypertension, on home labetalol 600mg TID  - /95 in ED  - c/w home labetalol 600mg TID  - Start losartan 25mg daily tomorrow  - monitor BP  - f/u EKG    #Migraine headaches  - Pt complains of severe headaches around b/l temples and back of head  - Associated with episodes of high blood pressure  - Motrin PRN for headache (Tylenol ineffective per patient)  - Management of HTN as above  - Consider outpatient follow-up with neurology Patient with known history of hypertension, on home labetalol 600mg TID  - /95 in ED  - discontinue home labetalol 600mg TID  - Start losartan 50mg daily tomorrow  - monitor BP  - f/u EKG    #Migraine headaches  - Pt complains of severe headaches around b/l temples and back of head  - Associated with episodes of high blood pressure  - Motrin PRN for headache (Tylenol ineffective per patient)  - Management of HTN as above  - Consider outpatient follow-up with neurology Patient with BMI 46.2.   - Patient education  - Nutrition consult

## 2020-06-16 NOTE — ED PROVIDER NOTE - ATTENDING CONTRIBUTION TO CARE
39 yo F with hx of DM, HTN s/p multiple abdominal wall abscess wash outs last year of  site, recently admitted here in  for cellulitis, now p/w 1 week of returning redness, pain, and white discharge from vaginal area.   Glucose uncontrolled recently.  Pt also c/o dysuria.  Pt tachycardic, hyperglycemic, elevated lactate and wbc CT.  Obese nontender belly, + skin breakdown from mons pubis to perianal region.  Plan abx, IVF, CT, gyn consult and admit.

## 2020-06-16 NOTE — ED ADULT TRIAGE NOTE - CHIEF COMPLAINT QUOTE
pt c/o infection to her vagina. pt states " I have bumps, severe pain, open sores, and discharge. I'm a diabetic and it has been getting work."
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
## 2020-06-16 NOTE — H&P ADULT - PROBLEM SELECTOR PLAN 7
F: tolerating PO, no IVF  E: replete K<4, Mg<2  N: Dash/TLC    VTE Prophylaxis: Lovenox SubQ  C: Full Code  D: RMF Patient with known history of hyperlipidemia. Not on outpatient statin.   - f/u lipid profile Patient with known history of hyperlipidemia. Not on outpatient statin.   - 10-year ASCVD risk is 7.6% (intermediate risk)  - Start lipitor 40mg daily Patient with known history of hyperlipidemia. Not on outpatient statin.   - 10-year ASCVD risk is 7.6% (intermediate risk)  - Start lipitor 40mg daily    #Transaminitis  Alk phos 168, AST 46. Elevated LFTs likely 2/2 sepsis  - Continue to trend Patient with known history of hypertension, on home labetalol 600mg TID  - /95 in ED  - discontinue home labetalol 600mg TID  - Start losartan 50mg daily tomorrow  - monitor BP  - f/u EKG    #Migraine headaches  - Pt complains of severe headaches around b/l temples and back of head  - Associated with episodes of high blood pressure  - Motrin PRN for headache (Tylenol ineffective per patient)  - Management of HTN as above  - Consider outpatient follow-up with neurology

## 2020-06-16 NOTE — ED ADULT NURSE NOTE - NSIMPLEMENTINTERV_GEN_ALL_ED
Implemented All Universal Safety Interventions:  [place] to call system. Call bell, personal items and telephone within reach. Instruct patient to call for assistance. Room bathroom lighting operational. Non-slip footwear when patient is off stretcher. Physically safe environment: no spills, clutter or unnecessary equipment. Stretcher in lowest position, wheels locked, appropriate side rails in place.

## 2020-06-16 NOTE — CONSULT NOTE ADULT - ASSESSMENT
39 yo  with PMH of uncontrolled DM2 and cHTN presenting to ED for 2 weeks of worsening vaginal and perianal pain, vaginal discharge and dysuria. In triage, patient noted to have elevated BPs 171/100 (patient reports at home they were as high as 240s) as well as fasting . Per conversation with ED attending patient stated on vancomycin and cefepime and to be admitted to medicine for optimization of medical comorbidities and treatment of complicated yeast infection and superimposed cellulitis. GYN to defer to primary team management, will follow up cultures of cervix, and ulcer as well as vaginitis panel    # Complicated vaginitis  - Complicated vaginitis given patient is immunocompromised due to uncontrolled diabetes, severity of symptoms as well as history of recurrent candidiasis. Plan to treat with initial induction therapy of fluconazole 150mg every 72 hours for three doses, followed by maintenance of fluconazole therapy (150mg) per week for 6 months.  Please keep area dry and add nystatin powder specifically on mons, groin and inguinal folds   - Pain management: add 5% topical lidocaine to affected area  - Will follow up cultures of cervix, ulcer, urine, blood as well as vaginitis panel and urine G/C   - GYN will follow 39 yo  with PMH of uncontrolled DM2 and cHTN presenting to ED for 2 weeks of worsening vaginal and perianal pain, vaginal discharge and dysuria. In triage, patient noted to have elevated BPs 171/100 (patient reports at home they were as high as 240s) as well as fasting . Per conversation with ED attending patient stated on vancomycin and cefepime and to be admitted to medicine for optimization of medical comorbidities and treatment of complicated yeast infection and superimposed cellulitis. GYN to defer to primary team management, will follow up cultures of cervix, and ulcer as well as vaginitis panel    # Complicated vaginitis  - Complicated vaginitis given patient is immunocompromised due to uncontrolled diabetes, severity of symptoms as well as history of recurrent candidiasis. Plan to treat with initial induction therapy of fluconazole 150mg daily for 7-14 days (we will reassess after 7 days), followed by 150mg weekly for 6 months. Please trend LFTs with this treatment.   Please keep area dry and add nystatin powder specifically on mons, groin and inguinal folds   - Pain management: add 5% topical lidocaine to affected area  - Will follow up cultures of cervix, ulcer, urine, blood as well as vaginitis panel and urine G/C   - GYN will follow 39 yo  with PMH of uncontrolled DM2 and cHTN presenting to ED for 2 weeks of worsening vaginal and perianal pain, vaginal discharge and dysuria. In triage, patient noted to have elevated BPs 171/100 (patient reports at home they were as high as 240s) as well as fasting . Per conversation with ED attending patient stated on vancomycin and cefepime and to be admitted to medicine for optimization of medical comorbidities and treatment of complicated yeast infection and superimposed cellulitis. GYN to defer to primary team management, will follow up cultures of cervix, and ulcer as well as vaginitis panel    # Complicated vaginitis  - Complicated vaginitis given patient is immunocompromised due to uncontrolled diabetes, severity of symptoms as well as history of recurrent candidiasis. Plan to treat with initial induction therapy of fluconazole 150mg daily for 7-14 days (we will reassess after 7 days), followed by 150mg weekly for 6 months. Please trend LFTs with this treatment.   Please keep area dry and add nystatin powder specifically on mons, groin and inguinal folds   - Pain management: add 5% topical lidocaine to affected area  - Will follow up cultures of cervix, ulcer, urine, blood as well as vaginitis panel and urine G/C   - GYN will follow    Discsused w/ Dr. Rivera [place]  39 yo  with PMH of uncontrolled DM2 and cHTN presenting to ED for 2 weeks of worsening vaginal and perianal pain, vaginal discharge and dysuria. In triage, patient noted to have elevated BPs 171/100 (patient reports at home they were as high as 240s) as well as fasting . Per conversation with ED attending patient stated on vancomycin and cefepime and to be admitted to medicine for optimization of medical comorbidities and treatment of complicated yeast infection and superimposed cellulitis. GYN to defer to primary team management, will follow up cultures of cervix, and ulcer as well as vaginitis panel    # Complicated vaginitis  - Complicated vaginitis given patient is immunocompromised due to uncontrolled diabetes, severity of symptoms as well as history of recurrent candidiasis. Plan to treat with initial induction therapy of fluconazole 150mg daily for 7-14 days (we will reassess after 7 days), followed by 150mg weekly for 6 months. Please trend LFTs with this treatment.   Please keep area dry and add nystatin powder specifically on mons, groin and inguinal folds   - Pain management: add 5% topical lidocaine to affected area  - Will follow up cultures of cervix, ulcer, urine, blood as well as vaginitis panel and urine G/C   - GYN will follow    Discussed w/ Dr. Rivera [place]  41 yo  with PMH of uncontrolled DM2 and cHTN presenting to ED for 2 weeks of worsening vaginal and perianal pain, vaginal discharge and dysuria. In triage, patient noted to have elevated BPs 171/100 (patient reports at home they were as high as 240s) as well as fasting . Per conversation with ED attending patient stated on vancomycin and cefepime and to be admitted to medicine for optimization of medical comorbidities and treatment of complicated yeast infection and superimposed cellulitis. GYN to defer to primary team management, will follow up cultures of cervix, and ulcer as well as vaginitis panel    # Complicated vaginitis  - Complicated vaginitis given patient is immunocompromised due to uncontrolled diabetes, severity of symptoms as well as history of recurrent candidiasis. Plan to treat with initial induction therapy of fluconazole 150mg daily for 7-14 days (we will reassess after 7 days), followed by 150mg weekly for 6 months. Please trend LFTs with this treatment.   Please keep area dry and add nystatin powder specifically on mons, groin and inguinal folds   - Pain management: add 5% topical lidocaine to affected area  - Will follow up cultures of cervix, ulcer, urine, blood as well as vaginitis panel and urine G/C   - Recommend P.O Dilaudid 2-4mg q6 hours and P.O Gabapentin for pain control (possible Lyrica if Gabapentin doesn't help).   - GYN will follow    Discussed w/ Dr. Rivera [place] . 41 yo  with PMH of uncontrolled DM2 and cHTN presenting to ED for 2 weeks of worsening vaginal and perianal pain, vaginal discharge and dysuria. In triage, patient noted to have elevated BPs 171/100 (patient reports at home they were as high as 240s) as well as fasting . Per conversation with ED attending patient stated on vancomycin and cefepime and to be admitted to medicine for optimization of medical comorbidities and treatment of complicated yeast infection and superimposed cellulitis. GYN to defer to primary team management, will follow up cultures of cervix, and ulcer as well as vaginitis panel    # Complicated vaginitis  - Complicated vaginitis given patient is immunocompromised due to uncontrolled diabetes, severity of symptoms as well as history of recurrent candidiasis. Plan to treat with initial induction therapy of fluconazole 150mg daily for 7-14 days (we will reassess after 7 days), followed by 150mg weekly for 6 months. Please trend LFTs with this treatment.   Please keep area dry and add nystatin powder specifically on mons, groin and inguinal folds   - Pain management: add 5% topical lidocaine to affected area  - Will follow up cultures of cervix, ulcer, urine, blood as well as vaginitis panel and urine G/C   - Recommend P.O Dilaudid 2-4mg q6 hours and P.O Gabapentin for pain control (possible Lyrica if Gabapentin doesn't help).   - GYN will follow    Patient seen and examined  w/ Dr. Rivera [place] .

## 2020-06-16 NOTE — H&P ADULT - PROBLEM SELECTOR PLAN 3
Patient with insulin dependent diabetes. HgA1c 12, poorly controlled. Patient on home Lantus 35 units at bedtime   - received Lantus 30 units in ED  - monitor fingersticks Patient with vaginal candidiasis likely 2/2 poorly controlled diabetes. GYN consulted on admission.  - c/w fluconazole 150mg daily for 7-14 days (long term therapy for 6 months)  - c/w pain management: add 5% topical lidocaine to affected area  - f/u cultures of cervix, ulcer, urine, blood as well as vaginitis panel and urine G/C   -  P.O Dilaudid 2mg q6 hours and P.O Gabapentin for pain control Patient with vaginal candidiasis likely 2/2 poorly controlled diabetes. GYN consulted on admission.  - c/w fluconazole 150mg daily for 7-14 days (long term therapy for 6 months)  - c/w pain management: add 5% topical lidocaine to affected area  - f/u cultures of cervix, ulcer, urine, blood as well as vaginitis panel and urine G/C   - Dilaudid 2mg PO q6 hours and gabapentin PO for pain control Patient with vaginal candidiasis likely 2/2 poorly controlled diabetes. GYN consulted on admission.  - c/w fluconazole 150mg daily for 7-14 days (followed by long term therapy for 6 months)  - c/w pain management: add 5% topical lidocaine to affected area  - f/u cultures of cervix, ulcer, urine, blood as well as vaginitis panel and urine G/C   - Dilaudid 2mg PO q6 hours and gabapentin 300 mg PO BID for pain control ADDENDUM: Significant perianal and gluteal tenderness, erythema, induration present. ?L gluteal phlegmon vs abscess. No drainable area visible on CT. Surgery unable to assess for drainable pocket by exam due to significant tenderness of the region. Patient w/ GNR/pseudomonal RFs  -c/w vanc, cefepime 2g q12h  -f/u bcx  -surgery following appreciate recs

## 2020-06-16 NOTE — H&P ADULT - NSHPLABSRESULTS_GEN_ALL_CORE
.  LABS:                         14.0   13.95 )-----------( 351      ( 16 Jun 2020 09:29 )             43.4     06-16    135  |  100  |  7   ----------------------------<  436<H>  3.6   |  21<L>  |  0.56    Ca    8.2<L>      16 Jun 2020 12:51    TPro  7.3  /  Alb  3.5  /  TBili  0.3  /  DBili  x   /  AST  46<H>  /  ALT  39  /  AlkPhos  168<H>  06-16    PT/INR - ( 16 Jun 2020 09:29 )   PT: 13.1 sec;   INR: 1.14          PTT - ( 16 Jun 2020 09:29 )  PTT:29.0 sec  Urinalysis Basic - ( 16 Jun 2020 09:30 )    Color: Yellow / Appearance: SL Cloudy / SG: >=1.030 / pH: x  Gluc: x / Ketone: Trace mg/dL  / Bili: Negative / Urobili: 0.2 E.U./dL   Blood: x / Protein: 30 mg/dL / Nitrite: POSITIVE   Leuk Esterase: Trace / RBC: < 5 /HPF / WBC > 10 /HPF   Sq Epi: x / Non Sq Epi: Moderate /HPF / Bacteria: Present /HPF            Lactate, Blood: 2.7 mmol/L (06-16 @ 18:02)  Lactate, Blood: 3.5 mmol/L (06-16 @ 14:56)  Lactate, Blood: 3.6 mmol/L (06-16 @ 12:51)  Lactate, Blood: 5.4 mmol/L (06-16 @ 09:29)      RADIOLOGY, EKG & ADDITIONAL TESTS: Reviewed.

## 2020-06-16 NOTE — ED ADULT NURSE NOTE - OBJECTIVE STATEMENT
c/o pain to labia and rectum reported with open sores and whitish-greyish discharge from her vagina since March. Pt also reported having bloody and purulent drain. Reported burning with urination and unable to tolerate sitting position due to pain. Pt quotes "I've taken Motrin 800-1000mg 5-6 times per day to help me with the pain (reported lasting for 1.5 hours of relief)." Denies fever or chills.

## 2020-06-16 NOTE — CHART NOTE - NSCHARTNOTEFT_GEN_A_CORE
Patient seen and examined by general surgery senior resident on call.     Patient was incredibly reluctant for physical exam. Discussed with patient that she cannot get adequate treatment without an exam. After much encouragement, I was able to get her to at least show me the rectal area herself, although she prohibited me from examining or even lightly touching the area myself.     On visual exam, there is thickened skin with erythema and maceration of the skin noted on both buttock inner folds. Did not see any one area draining purulent drainage that I would visually call an abscess.    Patient would not lie flat for an abdominal exam, but had no tenderness of abdomen while on her side.     At this point, concern is for possible cellulitis extending from groin, vulva, and perianal area also with UTI and uncontrolled DM with admission glucose in 500s, especially given leukocytosis to 14 and lactate on admission of 5. The area of the buttock noted on CT pelvis as 3cm phlegmon vs early abscess of inferior left buttock with no fistulous connection to anal canal does not seem to be drainable on CT scan, however, as mentioned, she would not allow for rectal exam. At some point, this area may mature to drainable abscess. Surgery will continue to follow and encourage her to allow examination for further management. In the meantime, continue with antibiotics as planned for cellulitis, with hot compresses if tolerated to buttocks for possible symptom control.     At this point, she has received    cefepime IV 1000 mg at 11:00 am  ceftriaxone IV 1000 mg 20:00  fluconazole 150 mg IV 1300 for candida  Vancomycin 10 am

## 2020-06-16 NOTE — H&P ADULT - PROBLEM SELECTOR PLAN 8
DISCHARGE PLANNING:  Medical readiness goals:  Anticipated d/c:  PCP:     Pharmacy: F: s/p 4L NS   E: replete to K>4, Mg>2; Phos >3  N: Dash/TLC; CC    VTE Prophylaxis: Lovenox SubQ  C: Full Code  D: F F: s/p 4L NS  E: replete to K>4, Mg>2; Phos >3  N: Dash/TLC; CC  VTE Prophylaxis: Lovenox SubQ  C: Full Code  D: F F: s/p 4L NS  E: replete to K>4, Mg>2; Phos >3  N: Dash/TLC; CC  VTE Prophylaxis: Lovenox SubQ BID  C: Full Code  D: MANDI Patient with known history of hyperlipidemia. Not on outpatient statin.   - 10-year ASCVD risk is 7.6% (intermediate risk)  - Start lipitor 40mg daily when elevated LFTs resolve    #Transaminitis  Alk phos 168, AST 46. Elevated LFTs likely 2/2 sepsis  - Continue to trend

## 2020-06-16 NOTE — H&P ADULT - PROBLEM SELECTOR PLAN 10
DISCHARGE PLANNING:  Medical readiness goals: endo consult   PCP: Dr. Grace  Pharmacy: [facility]

## 2020-06-16 NOTE — H&P ADULT - PROBLEM SELECTOR PROBLEM 8
Encounter for support and coordination of transition of care Nutrition, metabolism, and development symptoms Hyperlipidemia

## 2020-06-16 NOTE — ED PROVIDER NOTE - OBJECTIVE STATEMENT
40 F pmh uncontrolled dm, htn, post operative wound infection following birth in 4/2019 requiring IR drainage and several abdominal washout of wound site with surgery team following b/l hernia repair, p/w one week vaginal and perianal pain.  She reports constant severe vaginal/perianal pain w/ significant swelling and tearing of skin w/ itching and occasional white d/c from vaginal and perianal area.  She was recently admitted in March with same complaint, since d/c reports completed course of antifungal and has been using creams as prescribed.  Over past week pain has been unbearable and reports her sugars/blood pressure have been poorly controlled.  Alos + dysuria, no hematuria/frequency.  Denies fever, chills, headache, dizziness, fainting, chest pain, sob, abd pain, nvd, constipation, h/o STIs, vaginal bleeding, trauma. 40 F pmh uncontrolled dm, htn, post operative wound infection following birth in 4/2019 requiring IR drainage and several abdominal washout of wound site with surgery team following b/l hernia repair, p/w one week vaginal and perianal pain.  She reports constant severe vaginal/perianal pain w/ significant swelling and tearing of skin w/ itching and occasional white d/c from vaginal and perianal area.  She was recently admitted in March with same complaint, since d/c reports completed course of antifungal and has been using creams as prescribed.  Over past week pain has been unbearable and reports her sugars/blood pressure have been poorly controlled.  Also + dysuria, no hematuria/frequency.  Denies fever, chills, headache, dizziness, fainting, chest pain, sob, abd pain, nvd, constipation, h/o STIs, vaginal bleeding, trauma.

## 2020-06-16 NOTE — H&P ADULT - PROBLEM SELECTOR PLAN 2
Patient meeting severe sepsis criteria on admission (Lactate, leukocytosis, tachycardia). UA positive for specific gravity >1.030, nitrites, trace leuk esterase, >10 WBC, +bacteria. Received cefipime 1g (due to drug allergies)  - c/w ceftriaxone 1g qd (pre-treat w/ benadryl)  - f/u blood culture  - f/u urine culture Patient meeting severe sepsis criteria on admission (Lactate, leukocytosis, tachycardia). UA positive for specific gravity >1.030, nitrites, trace leuk esterase, >10 WBC, +bacteria.   - s/p cefepime 1g (due to drug allergies)  - c/w ceftriaxone 1g qd (pre-treat w/ benadryl)  - f/u blood culture  - f/u urine culture Patient presents with pain, burning with urination x2 weeks. Meeting severe sepsis criteria on admission. UA positive for specific gravity >1.030, nitrites, trace leuk esterase, >10 WBC, +bacteria.   - c/w ceftriaxone 1g qd (pre-treat w/ benadryl)  - Despite patient's compromised immune status in setting of poorly controlled DM, low suspicion for Pseudomonas or MDR organisms; hold off on zosyn or carbapenem  - f/u blood culture  - f/u urine culture Patient with vaginal candidiasis likely 2/2 poorly controlled diabetes. GYN consulted on admission.  - c/w fluconazole 150mg (q72hr vs qd) for 7-14 days (followed by long term therapy for 6 months) (confirm dose w/ gyn)  - c/w pain management: add 5% topical lidocaine to affected area  - f/u cultures of cervix, ulcer, urine, blood as well as vaginitis panel and urine G/C   - Dilaudid 2mg PO q6 hours and gabapentin 300 mg PO BID for pain control Patient with vaginal candidiasis likely 2/2 poorly controlled diabetes. GYN consulted on admission.  - c/w fluconazole 150mg q72hr x 3doses (followed by long term therapy for 6 months)   - c/w pain management: add 5% topical lidocaine to affected area  - f/u cultures of cervix, ulcer, urine, blood as well as vaginitis panel and urine G/C   - Dilaudid 2mg PO q6 hours and gabapentin 300 mg PO BID for pain control
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
## 2020-06-16 NOTE — H&P ADULT - NSHPSOCIALHISTORY_GEN_ALL_CORE
Previously smoked cigarettes  Denies EtOH, drug use.  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 Previously smoked cigarettes  Denies EtOH, drug use.  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

## 2020-06-16 NOTE — H&P ADULT - ATTENDING COMMENTS
Patient w/ significant perianal tenderness on exam. Perianal/gluteal region exquisitely tender to palpation. Surrounding erythema and induration present. Significant labial and perineal erythema present with thick white vaginal discharge present. CT pelvis with possible phlegmon vs abscess in the L gluteus. Pt refused surgical attempt to examine the area due to pain, and surgery unable to confirm the presence of a drainable pocket. Patient w/ poorly controlled DM in the setting of poor diet.     Plan  -Candida vulvovaginitis: c/w fluconazole 150mg q72hr   -Gluteal cellulitis w/ ?L phlegmon: c/w vanc, cefepime   -UTI: c/w cefepime  -f/u bcx, ucx  -Gyn following, recs appreciated  -Surgery following, recs appreciated  -Proteinuria: likely 2/2 poorly controlled DM. F/u microalbumin/Cr. Switch labetalol to losartan 50mg qd  -start high intensity lipitor  -Analgesics prn Patient describes initial yeast infection in March, not recurrent w/ copious white discharge, pruritus, and perineal/ingunial erythema. Patient also describes a new perianal/gluteal rash which began 1 week ago and a tender bump that rapidly enlarged, now with significant pain. Patient also reporting dysuria.    Patient w/ significant perianal tenderness on exam. Perianal/gluteal region exquisitely tender to palpation. Surrounding erythema and induration present. Significant labial and perineal erythema present with thick white vaginal discharge present. CT pelvis with possible phlegmon vs abscess in the L gluteus. Pt refused surgical attempt to examine the area due to pain, and surgery unable to confirm the presence of a drainable pocket. Patient w/ poorly controlled DM in the setting of poor diet.     Plan  -Candida vulvovaginitis: c/w fluconazole 150mg q72hr; confirm dose w/ gyn  -Gluteal cellulitis w/ ?L phlegmon: c/w vanc, cefepime   -UTI: c/w cefepime  -f/u bcx, ucx  -Gyn following, recs appreciated  -Surgery following, recs appreciated  -Proteinuria: likely 2/2 poorly controlled DM. F/u microalbumin/Cr. Patient reports poorly controlled BP recently and is unsure why she is on labetaloll. Will transition labetalol to losartan 50mg qd  -ASCVD 7.6%; will start high intensity statin upon resolution of elevated LFTs  -Analgesics prn  -Endo consult in AM Patient describes initial yeast infection in March, not recurrent w/ copious white discharge, pruritus, and perineal/ingunial erythema. Patient also describes a new perianal/gluteal rash which began 1 week ago and a tender bump that rapidly enlarged, now with significant pain. Patient also reporting dysuria.    Patient w/ significant perianal tenderness on exam. Perianal/gluteal region exquisitely tender to palpation. Surrounding erythema and induration present. Significant labial and perineal erythema present with thick white vaginal discharge present. CT pelvis with possible phlegmon vs abscess in the L gluteus. Pt refused surgical attempt to examine the area due to pain, and surgery unable to confirm the presence of a drainable pocket. Patient w/ poorly controlled DM in the setting of poor diet.     Plan  -Candida vulvovaginitis: c/w fluconazole 150mg q72hr  -Gluteal cellulitis w/ ?L phlegmon: c/w vanc, cefepime   -UTI: c/w cefepime  -f/u bcx, ucx  -Gyn following, recs appreciated  -Surgery following, recs appreciated  -Proteinuria: likely 2/2 poorly controlled DM. F/u microalbumin/Cr. Patient reports poorly controlled BP recently and is unsure why she is on labetaloll. Will transition labetalol to losartan 50mg qd  -ASCVD 7.6%; will start high intensity statin upon resolution of elevated LFTs  -Analgesics prn  -Endo consult in AM Patient describes initial yeast infection in March, not recurrent w/ copious white discharge, pruritus, and perineal/ingunial erythema. Patient also describes a new perianal/gluteal rash which began 1 week ago and a tender bump that rapidly enlarged, now with significant pain. Patient also reporting dysuria.    Patient w/ significant perianal tenderness on exam. Perianal/gluteal region exquisitely tender to palpation. Surrounding erythema and induration present. Significant labial and perineal erythema present with thick white vaginal discharge present. CT pelvis with possible phlegmon vs abscess in the L gluteus. Pt refused surgical attempt to examine the area due to pain, and surgery unable to confirm the presence of a drainable pocket. Patient w/ poorly controlled DM in the setting of poor diet.     Plan  -Candida vulvovaginitis: c/w fluconazole 150mg q72hr  -Gluteal cellulitis w/ ?L phlegmon: c/w vanc, cefepime   -UTI: c/w cefepime  -f/u bcx, ucx  -Gyn following, recs appreciated  -Surgery following, recs appreciated  -Proteinuria: likely 2/2 poorly controlled DM. F/u microalbumin/Cr. Patient reports poorly controlled BP recently and is unsure why she is on labetalol. Will transition labetalol to losartan 50mg qd once sepsis resolves  -ASCVD 7.6%; will start high intensity statin upon resolution of elevated LFTs  -Analgesics prn  -Endo consult in AM Patient describes initial yeast infection in March, not recurrent w/ copious white discharge, pruritus, and perineal/ingunial erythema. Patient also describes a new perianal/gluteal rash which began 1 week ago and a tender bump that rapidly enlarged, now with significant pain. Patient also reporting dysuria.    Patient w/ significant perianal tenderness on exam. Perianal/gluteal region exquisitely tender to palpation. Surrounding erythema and induration present. Significant labial and perineal erythema present with thick white vaginal discharge present. CT pelvis with possible phlegmon vs abscess in the L gluteus. Pt refused surgical attempt to examine the area due to pain, and surgery unable to confirm the presence of a drainable pocket. Patient w/ poorly controlled DM in the setting of poor diet.     Plan  -Candida vulvovaginitis: c/w fluconazole 150mg q72hr  -Gluteal bacterial cellulitis vs. anal candidiasis: w/ ?L phlegmon/abscess present. Will manage as bacterial cellulitis given tenderness, erythema, induration, and possible abscess. C/w vanc, cefepime   -UTI: c/w cefepime  -f/u bcx, ucx  -Gyn following, recs appreciated  -Surgery following, recs appreciated  -Proteinuria: likely 2/2 poorly controlled DM. F/u microalbumin/Cr. Patient reports poorly controlled BP recently and is unsure why she is on labetalol. Will transition labetalol to losartan 50mg qd once sepsis resolves  -ASCVD 7.6%; will start high intensity statin upon resolution of elevated LFTs  -Analgesics prn  -Endo consult in AM

## 2020-06-16 NOTE — ED PROVIDER NOTE - PROGRESS NOTE DETAILS
glucose 518, mild AG but normal pH/betahydroxybutyrate and only trace ketones in urine- do not suspect DKA.  also noted to be hypertensive, takes labetalol missed dose today.  will give insulin and labetalol.  pending CT pelvis and will move North after GYN eval pt c/o itching after receiving cefepime, no resp sxs given benadryl but still c/o pruritis will give pepcid/atarax as well, want to avoid steroids in setting of hyperglycemia/candida improving glucose and BP but lactate remains elevated after hydration at 3.5 and abx.  likely in setting of sepsis 2/2 vaginal/perianal cellulitis.  CT pelvis shows 3cm phlegmon vs early abscess but no drainable area.  in setting of no surgical pathology will consult MICU improving glucose and BP but lactate remains elevated after 3L hydration at 3.5 and abx.  likely in setting of sepsis 2/2 vaginal/perianal cellulitis.  CT pelvis shows 3cm phlegmon vs early abscess but no drainable area.  in setting of no surgical pathology will consult MICU for severe sepsis ICU evaluated pt- recs are for rpt lactate, floor medicine admission, surgery consult for possible early abscess on CT (d/w surgery) and will give lantus dose

## 2020-06-16 NOTE — CONSULT NOTE ADULT - SUBJECTIVE AND OBJECTIVE BOX
Attending: Fadia    HPI: 40F PMHx poorly controlled DM2, HTN, ventral hernia repair with infected mesh, multiple washouts (3/2018), severe pre-eclampsia requiring <32 week  c/b hemoperitoneum, exploratory laparotomy (2019), recently admitted for vulvar candidiasis, treated with fluconazole, no CT scan performed (3/21/2020 - 3/23/2020), was admitted to Medicine for vulvar pain, redness and swelling x "months", no significant changes over the last few days, aggravated by urinating and bowel movements. No fevers, chills, chest pain, dyspnea, cough, nausea, vomiting, constipation, diarrhea or obstipation. Tolerating regular diet at home.     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, works with computers  FH: noncontributory    T(C): 37.2 HR: 115 --> 107 after 4L BP: 156/96 RR: 20 SpO2: 98%     Physical Exam:  General: tearful, uncomfortable, NAD; tolerates minimal examination  HEENT: MMM  Pulmonary: nonlabored breathing, normal resp effort  Cardiovascular: RRR  Abdominal: morbidly obese, soft, nontender, nondistended  : diffuse erythema to perineum, did not allow vulvar or vaginal exam; buttocks with erythema and no fluctuance or induration appreciated, but she refused thorough examination  Extremities: WWP, no edema, no calf tenderness  Neuro: no focal deficits  Psych: pleasant, tearful    LABS:                        14.0   13.95 )-----------( 351               43.4     -16    135  |  100  |  7   ----------------------------<  436<H>  3.6   |  21<L>  |  0.56      Lactate 5.4 --> 3.5 after 4L IVF    RADIOLOGY & ADDITIONAL STUDIES:  < from: CT Pelvis No Cont (20 @ 14:03) >  Impression: 3 cm area of infiltration of subcutaneous fat in the inferior left buttock consistent with superficial phlegmonous change or perhaps early abscess, probably related to the dermal appendages. Does not appear to be drainable at this time. No fistulous connection to the anal canal.    < end of copied text >    Assessment: 40F PMHx poorly controlled DM2, HTN, ventral hernia c/b infected mesh (2018),  c/b hemoperitoneum (2019), recently admitted for vulvar candidiasis, presents with cellulitis and possible fungal infection of perineum with severe hyperglycemia.    Recommendations:  - DM management per primary team  - please continue to trend lactate  - continue antibiotics  - discussed with the patient importance of allowing a thorough physical examination by surgical service; however, she states she is in too much pain and refused a thorough examination by maria isabel and senior surgery residents. She understands that her infection may mature to an abscess that would allow drainage and that this requires a thorough examination to monitor. She will consider an examination tomorrow.  - continue antibiotics for cellulitis  - discussed with chief on call, attending surgeon

## 2020-06-16 NOTE — ED PROVIDER NOTE - CLINICAL SUMMARY MEDICAL DECISION MAKING FREE TEXT BOX
40 F pmh uncontrolled dm, htn, post operative wound infection following birth in 4/2019 requiring IR drainage and several abdominal washout of wound site with surgery team following b/l hernia repair, p/w one week vaginal and perianal pain. pt tachycardic and hyperglycemia, on exam diffuse swelling/erythema, macerated skin and warmth to vaginal/perianal region, likely candida w/ superimposed cellulitis.  concern for nec fasc.  will obtain labs, blood cult, CT pelvis, give abx and consult gyn

## 2020-06-16 NOTE — H&P ADULT - PROBLEM SELECTOR PLAN 1
meeting 2/4 SIRS criteria with tachycardia to HR 110s and leukocytosis to WBC 13 with lactate elevation to 5. No fevers or hypotension, rather patient hypertensive on presentation.  Source likely severe vulvovaginitis candidiasis vs UTI.  Patient was recently admitted in March for vulvovaginitis and discharged on Fluconazole, for which she was compliant and noticed brief improvement in symptoms. However, shortly afterwards she noted worsening pain in vulva, vaginal discharge and dysuria which was not responding to over the counter antifungal medications.  This is likely in the setting of poorly controlled DM with ? compliance with diabetic medications and -500s.  - s/p Fluconazole, Cefepime and Vancomycin in ED  - s/p 4L NS with repeat lactate 3.5  - c/w maintenance fluids   - repeat lactate after 4th L NS; patient mentating and perfusing on exam w/ good UOP  - continue Fluconazole per GYN recommendations, patient will need prolonged course  - can also use topical antifungal creams (nystatin powder) and keep area dry and clean  - continue broad-spectrum antibiotics for UTI; may need to pretreat with benadryl  - optimize glycemic control   - surgery consult for evaluation on seen on CT on L buttocks that could represent phlegmon  - f/u  Ucx, Bcx, vaginitis panel, cervical swab Meeting 2/4 SIRS criteria with tachycardia to HR 110s and leukocytosis to WBC 13 with lactate elevation to 5. No fevers or hypotension, rather patient hypertensive on presentation. Source likely severe vulvovaginitis candidiasis vs UTI.  Patient was recently admitted in March for vulvovaginitis and discharged on Fluconazole, for which she was compliant and noticed brief improvement in symptoms. However, shortly afterwards she noted worsening pain in vulva, vaginal discharge and dysuria which was not responding to over the counter antifungal medications. Likely in setting of poorly controlled DM  - s/p Fluconazole, Cefepime and Vancomycin in ED  - s/p 4L NS with repeat lactate 3.5  - c/w maintenance fluids   - Continue Fluconazole per GYN recommendations, patient will need prolonged course  - Topical antifungal creams (nystatin powder)   - Keep area dry and clean  - Continue broad-spectrum antibiotics for UTI; may need to pretreat with benadryl  - Optimize glycemic control   - f/u surgery consult for evaluation on seen on CT on L buttocks that could represent phlegmon  - f/u Ucx, Bcx, vaginitis panel, cervical swab Meeting 2/4 SIRS criteria with tachycardia to HR 110s and leukocytosis to WBC 13 with lactate elevation to 5. No fevers or hypotension, rather patient hypertensive on presentation. Source likely severe vulvovaginitis candidiasis vs UTI.  Patient was recently admitted in March for vulvovaginitis and discharged on Fluconazole, for which she was compliant and noticed brief improvement in symptoms. However, shortly afterwards she noted worsening pain in vulva, vaginal discharge and dysuria which was not responding to over the counter antifungal medications. Likely in setting of poorly controlled DM  - s/p Fluconazole, Cefepime and Vancomycin in ED  - s/p 4L NS with repeat lactate 3.5  - f/u repeat lactate at 10pm  - c/w maintenance fluids   - Continue Fluconazole per GYN recommendations, patient will need prolonged course  - Topical antifungal creams (nystatin powder)   - Keep area dry and clean  - Continue broad-spectrum antibiotics for UTI; may need to pretreat with benadryl  - Optimize glycemic control   - f/u surgery consult for evaluation on seen on CT on L buttocks that could represent phlegmon  - f/u Ucx, Bcx, vaginitis panel, cervical swab Meeting 2/4 SIRS criteria with tachycardia to HR 110s and leukocytosis to WBC 13 with lactate elevation to 5. No fevers or hypotension, rather patient hypertensive on presentation. Source likely severe vulvovaginitis candidiasis vs UTI.  Patient was recently admitted in March for vulvovaginitis and discharged on Fluconazole, for which she was compliant and noticed brief improvement in symptoms. However, shortly afterwards she noted worsening pain in vulva, vaginal discharge and dysuria which was not responding to over the counter antifungal medications. Likely in setting of poorly controlled DM  - s/p Fluconazole, Cefepime and Vancomycin in ED  - s/p 4L NS with repeat lactate 3.5  - f/u repeat lactate at 10pm, start maintenance fluids if still elevated  - Continue Fluconazole per GYN recommendations, patient will likely need prolonged course ~6 months after discharge  - Topical antifungal creams (nystatin powder)   - Keep area dry and clean  - Continue broad-spectrum antibiotics for UTI; may need to pretreat with benadryl  - Optimize glycemic control   - f/u surgery consult for evaluation on seen on CT on L buttocks that could represent phlegmon  - f/u Ucx, Bcx, vaginitis panel, cervical swab

## 2020-06-16 NOTE — H&P ADULT - NSHPPHYSICALEXAM_GEN_ALL_CORE
.  VITAL SIGNS:  T(C): 37.2 (06-16-20 @ 18:09), Max: 37.2 (06-16-20 @ 18:09)  T(F): 98.9 (06-16-20 @ 18:09), Max: 98.9 (06-16-20 @ 18:09)  HR: 98 (06-16-20 @ 18:09) (91 - 115)  BP: 168/91 (06-16-20 @ 18:09) (122/77 - 171/110)  BP(mean): --  RR: 18 (06-16-20 @ 18:09) (16 - 18)  SpO2: 98% (06-16-20 @ 18:09) (96% - 98%)  Wt(kg): --    PHYSICAL EXAM:    Constitutional: WDWN resting comfortably in bed; NAD  Head: NC/AT  Eyes: PERRL, EOMI, clear conjunctiva  ENT: no nasal discharge; uvula midline, no oropharyngeal erythema or exudates; MMM  Neck: supple; no JVD or thyromegaly  Respiratory: CTA B/L; no W/R/R, no retractions  Cardiac: +S1/S2; RRR; no M/R/G; PMI non-displaced  Gastrointestinal: soft, NT/ND; no rebound or guarding; +BSx4  Genitourinary: normal external genitalia  Back: spine midline, no bony tenderness or step-offs; no CVAT B/L  Extremities: WWP, no clubbing or cyanosis; no peripheral edema  Musculoskeletal: NROM x4; no joint swelling, tenderness or erythema  Vascular: 2+ radial, femoral, DP/PT pulses B/L  Dermatologic: skin warm, dry and intact; no rashes, wounds, or scars  Lymphatic: no submandibular or cervical LAD  Neurologic: AAOx3; CNII-XII grossly intact; no focal deficits  Psychiatric: affect and characteristics of appearance, verbalizations, behaviors are appropriate .  VITAL SIGNS:  T(C): 37.2 (06-16-20 @ 18:09), Max: 37.2 (06-16-20 @ 18:09)  T(F): 98.9 (06-16-20 @ 18:09), Max: 98.9 (06-16-20 @ 18:09)  HR: 98 (06-16-20 @ 18:09) (91 - 115)  BP: 168/91 (06-16-20 @ 18:09) (122/77 - 171/110)  BP(mean): --  RR: 18 (06-16-20 @ 18:09) (16 - 18)  SpO2: 98% (06-16-20 @ 18:09) (96% - 98%)  Wt(kg): --    PHYSICAL EXAM:    Constitutional: Morbidly obese female, appears uncomfortable, sitting at edge of bed  Head: NC/AT  Eyes: PERRL, EOMI, clear conjunctiva  ENT: no nasal discharge; uvula midline, no oropharyngeal erythema or exudates; MMM  Respiratory: CTA B/L; no W/R/R  Cardiac: +S1/S2; RRR; no M/R/G  Gastrointestinal: Obese, soft, no rebound or guarding; mildly tender to palpation diffusely, +BSx4  Genitourinary: Diffuse erythematous rash in groin, vulva, and joey-anal area, thick white vaginal discharge  Extremities: WWP, no clubbing or cyanosis; no peripheral edema  Vascular: 2+ radial, femoral, DP/PT pulses B/L  Neurologic: AAOx3; CNII-XII grossly intact; no focal deficits  Psychiatric: affect and characteristics of appearance, verbalizations, behaviors are appropriate

## 2020-06-16 NOTE — H&P ADULT - ASSESSMENT
40F  with last delivery in 2019 c/b post-operative wound infection of abdomen requiring IR drainage, with hx of multiple abdominal washouts for SSI requiring PICC line for abx & hernia repair w/ mesh numerous times (Dr. Hurtado) and other PMH of DVT, uncontrolled DM2, HTN, HLD, admitted to [facility] in March for vaginal yeast infection discharged on Fluconazole, who now presents with 2 week of worsening vaginal and perianal pain, itching, copious thick white discharge and dysuria. 40F  with last delivery in 2019 c/b post-operative wound infection of abdomen requiring IR drainage, with hx of multiple abdominal washouts for SSI requiring PICC line for abx & hernia repair w/ mesh numerous times (Dr. Hurtado) and other PMH of DVT, uncontrolled DM2, HTN, HLD, admitted to [facility] in March for vaginal yeast infection discharged on Fluconazole, who now presents with 2 week of worsening vaginal and perianal pain, itching, copious thick white discharge and dysuria. 40F  with last delivery in 2019 c/b post-operative wound infection of abdomen requiring IR drainage, with hx of multiple abdominal washouts for SSI requiring PICC line for abx & hernia repair w/ mesh numerous times (Dr. Hurtado) and other PMH of DVT, uncontrolled DM2, HTN, HLD, admitted to [facility] in March for vaginal yeast infection discharged on Fluconazole, who now presents with 2 week of worsening vaginal and perianal pain, itching, copious thick white discharge and dysuria. Admitted for severe sepsis 2/2 UTI vs vulvovaginitis

## 2020-06-17 ENCOUNTER — TRANSCRIPTION ENCOUNTER (OUTPATIENT)
Age: 40
End: 2020-06-17

## 2020-06-17 DIAGNOSIS — B37.3 CANDIDIASIS OF VULVA AND VAGINA: ICD-10-CM

## 2020-06-17 DIAGNOSIS — K61.0 ANAL ABSCESS: ICD-10-CM

## 2020-06-17 DIAGNOSIS — N76.0 ACUTE VAGINITIS: ICD-10-CM

## 2020-06-17 DIAGNOSIS — N39.0 URINARY TRACT INFECTION, SITE NOT SPECIFIED: ICD-10-CM

## 2020-06-17 DIAGNOSIS — R51 HEADACHE: ICD-10-CM

## 2020-06-17 DIAGNOSIS — I10 ESSENTIAL (PRIMARY) HYPERTENSION: ICD-10-CM

## 2020-06-17 LAB
ALBUMIN SERPL ELPH-MCNC: 2.9 G/DL — LOW (ref 3.3–5)
ALP SERPL-CCNC: 128 U/L — HIGH (ref 40–120)
ALT FLD-CCNC: 35 U/L — SIGNIFICANT CHANGE UP (ref 10–45)
ANION GAP SERPL CALC-SCNC: 9 MMOL/L — SIGNIFICANT CHANGE UP (ref 5–17)
AST SERPL-CCNC: 58 U/L — HIGH (ref 10–40)
BASOPHILS # BLD AUTO: 0.02 K/UL — SIGNIFICANT CHANGE UP (ref 0–0.2)
BASOPHILS NFR BLD AUTO: 0.2 % — SIGNIFICANT CHANGE UP (ref 0–2)
BILIRUB SERPL-MCNC: <0.2 MG/DL — SIGNIFICANT CHANGE UP (ref 0.2–1.2)
BUN SERPL-MCNC: 4 MG/DL — LOW (ref 7–23)
CALCIUM SERPL-MCNC: 7.6 MG/DL — LOW (ref 8.4–10.5)
CHLORIDE SERPL-SCNC: 104 MMOL/L — SIGNIFICANT CHANGE UP (ref 96–108)
CO2 SERPL-SCNC: 23 MMOL/L — SIGNIFICANT CHANGE UP (ref 22–31)
CREAT SERPL-MCNC: 0.58 MG/DL — SIGNIFICANT CHANGE UP (ref 0.5–1.3)
EOSINOPHIL # BLD AUTO: 0.19 K/UL — SIGNIFICANT CHANGE UP (ref 0–0.5)
EOSINOPHIL NFR BLD AUTO: 2 % — SIGNIFICANT CHANGE UP (ref 0–6)
GLUCOSE BLDC GLUCOMTR-MCNC: 176 MG/DL — HIGH (ref 70–99)
GLUCOSE BLDC GLUCOMTR-MCNC: 237 MG/DL — HIGH (ref 70–99)
GLUCOSE BLDC GLUCOMTR-MCNC: 344 MG/DL — HIGH (ref 70–99)
GLUCOSE BLDC GLUCOMTR-MCNC: 350 MG/DL — HIGH (ref 70–99)
GLUCOSE BLDC GLUCOMTR-MCNC: 352 MG/DL — HIGH (ref 70–99)
GLUCOSE SERPL-MCNC: 371 MG/DL — HIGH (ref 70–99)
HCT VFR BLD CALC: 36.1 % — SIGNIFICANT CHANGE UP (ref 34.5–45)
HGB BLD-MCNC: 11.3 G/DL — LOW (ref 11.5–15.5)
IMM GRANULOCYTES NFR BLD AUTO: 0.5 % — SIGNIFICANT CHANGE UP (ref 0–1.5)
LACTATE SERPL-SCNC: 3.3 MMOL/L — HIGH (ref 0.5–2)
LACTATE SERPL-SCNC: 4 MMOL/L — CRITICAL HIGH (ref 0.5–2)
LYMPHOCYTES # BLD AUTO: 2.59 K/UL — SIGNIFICANT CHANGE UP (ref 1–3.3)
LYMPHOCYTES # BLD AUTO: 27.1 % — SIGNIFICANT CHANGE UP (ref 13–44)
MCHC RBC-ENTMCNC: 26.3 PG — LOW (ref 27–34)
MCHC RBC-ENTMCNC: 31.3 GM/DL — LOW (ref 32–36)
MCV RBC AUTO: 84.1 FL — SIGNIFICANT CHANGE UP (ref 80–100)
MONOCYTES # BLD AUTO: 0.63 K/UL — SIGNIFICANT CHANGE UP (ref 0–0.9)
MONOCYTES NFR BLD AUTO: 6.6 % — SIGNIFICANT CHANGE UP (ref 2–14)
NEUTROPHILS # BLD AUTO: 6.06 K/UL — SIGNIFICANT CHANGE UP (ref 1.8–7.4)
NEUTROPHILS NFR BLD AUTO: 63.6 % — SIGNIFICANT CHANGE UP (ref 43–77)
NRBC # BLD: 0 /100 WBCS — SIGNIFICANT CHANGE UP (ref 0–0)
PCP SPEC-MCNC: SIGNIFICANT CHANGE UP
PHOSPHATE SERPL-MCNC: 1.6 MG/DL — LOW (ref 2.5–4.5)
PLATELET # BLD AUTO: 283 K/UL — SIGNIFICANT CHANGE UP (ref 150–400)
POTASSIUM SERPL-MCNC: 3.8 MMOL/L — SIGNIFICANT CHANGE UP (ref 3.5–5.3)
POTASSIUM SERPL-SCNC: 3.8 MMOL/L — SIGNIFICANT CHANGE UP (ref 3.5–5.3)
PROT SERPL-MCNC: 5.7 G/DL — LOW (ref 6–8.3)
RBC # BLD: 4.29 M/UL — SIGNIFICANT CHANGE UP (ref 3.8–5.2)
RBC # FLD: 15.2 % — HIGH (ref 10.3–14.5)
SODIUM SERPL-SCNC: 136 MMOL/L — SIGNIFICANT CHANGE UP (ref 135–145)
VANCOMYCIN TROUGH SERPL-MCNC: 12.5 UG/ML — SIGNIFICANT CHANGE UP (ref 10–20)
WBC # BLD: 9.54 K/UL — SIGNIFICANT CHANGE UP (ref 3.8–10.5)
WBC # FLD AUTO: 9.54 K/UL — SIGNIFICANT CHANGE UP (ref 3.8–10.5)

## 2020-06-17 PROCEDURE — 99222 1ST HOSP IP/OBS MODERATE 55: CPT | Mod: GC

## 2020-06-17 PROCEDURE — 73110 X-RAY EXAM OF WRIST: CPT | Mod: 26,LT

## 2020-06-17 PROCEDURE — 70450 CT HEAD/BRAIN W/O DYE: CPT | Mod: 26

## 2020-06-17 PROCEDURE — 93970 EXTREMITY STUDY: CPT | Mod: 26

## 2020-06-17 PROCEDURE — 99233 SBSQ HOSP IP/OBS HIGH 50: CPT | Mod: GC

## 2020-06-17 RX ORDER — INSULIN GLARGINE 100 [IU]/ML
60 INJECTION, SOLUTION SUBCUTANEOUS AT BEDTIME
Refills: 0 | Status: DISCONTINUED | OUTPATIENT
Start: 2020-06-17 | End: 2020-06-18

## 2020-06-17 RX ORDER — CEFEPIME 1 G/1
2000 INJECTION, POWDER, FOR SOLUTION INTRAMUSCULAR; INTRAVENOUS EVERY 8 HOURS
Refills: 0 | Status: DISCONTINUED | OUTPATIENT
Start: 2020-06-17 | End: 2020-06-18

## 2020-06-17 RX ORDER — NYSTATIN CREAM 100000 [USP'U]/G
1 CREAM TOPICAL THREE TIMES A DAY
Refills: 0 | Status: DISCONTINUED | OUTPATIENT
Start: 2020-06-17 | End: 2020-06-18

## 2020-06-17 RX ORDER — OXYCODONE AND ACETAMINOPHEN 5; 325 MG/1; MG/1
1 TABLET ORAL EVERY 6 HOURS
Refills: 0 | Status: DISCONTINUED | OUTPATIENT
Start: 2020-06-17 | End: 2020-06-18

## 2020-06-17 RX ORDER — SODIUM CHLORIDE 9 MG/ML
1000 INJECTION, SOLUTION INTRAVENOUS
Refills: 0 | Status: DISCONTINUED | OUTPATIENT
Start: 2020-06-17 | End: 2020-06-17

## 2020-06-17 RX ORDER — SODIUM,POTASSIUM PHOSPHATES 278-250MG
2 POWDER IN PACKET (EA) ORAL ONCE
Refills: 0 | Status: COMPLETED | OUTPATIENT
Start: 2020-06-17 | End: 2020-06-17

## 2020-06-17 RX ORDER — VANCOMYCIN HCL 1 G
1000 VIAL (EA) INTRAVENOUS EVERY 8 HOURS
Refills: 0 | Status: DISCONTINUED | OUTPATIENT
Start: 2020-06-18 | End: 2020-06-18

## 2020-06-17 RX ORDER — DIPHENHYDRAMINE HCL 50 MG
25 CAPSULE ORAL ONCE
Refills: 0 | Status: COMPLETED | OUTPATIENT
Start: 2020-06-17 | End: 2020-06-17

## 2020-06-17 RX ORDER — OXYCODONE HYDROCHLORIDE 5 MG/1
5 TABLET ORAL ONCE
Refills: 0 | Status: DISCONTINUED | OUTPATIENT
Start: 2020-06-17 | End: 2020-06-17

## 2020-06-17 RX ORDER — SODIUM CHLORIDE 9 MG/ML
1000 INJECTION INTRAMUSCULAR; INTRAVENOUS; SUBCUTANEOUS ONCE
Refills: 0 | Status: COMPLETED | OUTPATIENT
Start: 2020-06-17 | End: 2020-06-17

## 2020-06-17 RX ORDER — METRONIDAZOLE 500 MG
500 TABLET ORAL EVERY 8 HOURS
Refills: 0 | Status: DISCONTINUED | OUTPATIENT
Start: 2020-06-17 | End: 2020-06-18

## 2020-06-17 RX ORDER — KETOROLAC TROMETHAMINE 30 MG/ML
30 SYRINGE (ML) INJECTION ONCE
Refills: 0 | Status: DISCONTINUED | OUTPATIENT
Start: 2020-06-17 | End: 2020-06-17

## 2020-06-17 RX ORDER — CEFEPIME 1 G/1
2000 INJECTION, POWDER, FOR SOLUTION INTRAMUSCULAR; INTRAVENOUS EVERY 8 HOURS
Refills: 0 | Status: DISCONTINUED | OUTPATIENT
Start: 2020-06-17 | End: 2020-06-17

## 2020-06-17 RX ORDER — VANCOMYCIN HCL 1 G
1000 VIAL (EA) INTRAVENOUS EVERY 8 HOURS
Refills: 0 | Status: DISCONTINUED | OUTPATIENT
Start: 2020-06-17 | End: 2020-06-17

## 2020-06-17 RX ORDER — ERGOCALCIFEROL 1.25 MG/1
50000 CAPSULE ORAL DAILY
Refills: 0 | Status: DISCONTINUED | OUTPATIENT
Start: 2020-06-17 | End: 2020-06-18

## 2020-06-17 RX ORDER — ATORVASTATIN CALCIUM 80 MG/1
40 TABLET, FILM COATED ORAL AT BEDTIME
Refills: 0 | Status: DISCONTINUED | OUTPATIENT
Start: 2020-06-17 | End: 2020-06-18

## 2020-06-17 RX ORDER — LOSARTAN POTASSIUM 100 MG/1
25 TABLET, FILM COATED ORAL DAILY
Refills: 0 | Status: DISCONTINUED | OUTPATIENT
Start: 2020-06-18 | End: 2020-06-18

## 2020-06-17 RX ADMIN — NYSTATIN CREAM 1 APPLICATION(S): 100000 CREAM TOPICAL at 14:09

## 2020-06-17 RX ADMIN — Medication 25 MILLIGRAM(S): at 07:30

## 2020-06-17 RX ADMIN — Medication 8: at 07:21

## 2020-06-17 RX ADMIN — ATORVASTATIN CALCIUM 40 MILLIGRAM(S): 80 TABLET, FILM COATED ORAL at 22:12

## 2020-06-17 RX ADMIN — NYSTATIN CREAM 1 APPLICATION(S): 100000 CREAM TOPICAL at 22:12

## 2020-06-17 RX ADMIN — INSULIN GLARGINE 60 UNIT(S): 100 INJECTION, SOLUTION SUBCUTANEOUS at 22:15

## 2020-06-17 RX ADMIN — Medication 250 MILLIGRAM(S): at 14:09

## 2020-06-17 RX ADMIN — CEFEPIME 100 MILLIGRAM(S): 1 INJECTION, POWDER, FOR SOLUTION INTRAMUSCULAR; INTRAVENOUS at 05:41

## 2020-06-17 RX ADMIN — ERGOCALCIFEROL 50000 UNIT(S): 1.25 CAPSULE ORAL at 17:56

## 2020-06-17 RX ADMIN — Medication 30 UNIT(S): at 14:06

## 2020-06-17 RX ADMIN — SODIUM CHLORIDE 180 MILLILITER(S): 9 INJECTION, SOLUTION INTRAVENOUS at 05:41

## 2020-06-17 RX ADMIN — Medication 100 MILLIGRAM(S): at 22:16

## 2020-06-17 RX ADMIN — OXYCODONE AND ACETAMINOPHEN 1 TABLET(S): 5; 325 TABLET ORAL at 21:10

## 2020-06-17 RX ADMIN — Medication 25 MILLIGRAM(S): at 14:09

## 2020-06-17 RX ADMIN — OXYCODONE HYDROCHLORIDE 5 MILLIGRAM(S): 5 TABLET ORAL at 04:32

## 2020-06-17 RX ADMIN — INSULIN GLARGINE 35 UNIT(S): 100 INJECTION, SOLUTION SUBCUTANEOUS at 00:01

## 2020-06-17 RX ADMIN — CEFEPIME 100 MILLIGRAM(S): 1 INJECTION, POWDER, FOR SOLUTION INTRAMUSCULAR; INTRAVENOUS at 22:14

## 2020-06-17 RX ADMIN — CEFEPIME 100 MILLIGRAM(S): 1 INJECTION, POWDER, FOR SOLUTION INTRAMUSCULAR; INTRAVENOUS at 14:09

## 2020-06-17 RX ADMIN — SODIUM CHLORIDE 2000 MILLILITER(S): 9 INJECTION INTRAMUSCULAR; INTRAVENOUS; SUBCUTANEOUS at 01:50

## 2020-06-17 RX ADMIN — Medication 100 MILLIGRAM(S): at 13:14

## 2020-06-17 RX ADMIN — Medication 2: at 14:00

## 2020-06-17 RX ADMIN — ENOXAPARIN SODIUM 40 MILLIGRAM(S): 100 INJECTION SUBCUTANEOUS at 13:13

## 2020-06-17 RX ADMIN — Medication 250 MILLIGRAM(S): at 00:20

## 2020-06-17 RX ADMIN — Medication 30 UNIT(S): at 18:39

## 2020-06-17 RX ADMIN — Medication 2 PACKET(S): at 07:41

## 2020-06-17 RX ADMIN — ENOXAPARIN SODIUM 40 MILLIGRAM(S): 100 INJECTION SUBCUTANEOUS at 22:12

## 2020-06-17 RX ADMIN — Medication 30 MILLIGRAM(S): at 14:09

## 2020-06-17 RX ADMIN — Medication 10: at 16:21

## 2020-06-17 RX ADMIN — Medication 25 MILLIGRAM(S): at 05:19

## 2020-06-17 RX ADMIN — Medication 30 UNIT(S): at 07:22

## 2020-06-17 RX ADMIN — Medication 4: at 22:15

## 2020-06-17 NOTE — PROGRESS NOTE ADULT - ASSESSMENT
40F with PMH of poorly controlled diabetes, HTN, ventral hernia c/b infected mesh (2018), C -section c/b haemoperitoneum (2019), admitted to medicine for vulvar candidiasis and cellulitis with severe hyperglycaemia, general surgery consulted to r/o buttocks abscess. 40F with PMH of poorly controlled diabetes, HTN, ventral hernia c/b infected mesh (2018), C -section c/b haemoperitoneum (2019), admitted to medicine for vulvar candidiasis and cellulitis with severe hyperglycaemia, general surgery consulted to r/o buttocks abscess.     Plan  No acute surgical intervention, no obvious drainable collection for now  Continue IV antibiotics  Trend WCC  Serial buttocks and perineal exams  Rest of care as per primary team  Surgery team 4C will continue to follow  Discussed with attending and chief resident

## 2020-06-17 NOTE — PROGRESS NOTE ADULT - PROBLEM SELECTOR PLAN 8
F: None  E: Replete for K<4 and Mag <2  N: Diet Carb Consistent, DASH/TLC  A: Lovenox   Dispo: RMF  Code: FULL CODE Pt with self-reported history of DVT, though was never on any AC. No calf tenderness or unilateral swelling on exam  -F/u lower extremity duplex Patient endorsing calf tenderness, L>R. Physical exam with reproducible tenderness, robb sign negative. Pt at risk for DVT given immobility and obesity. LE dopplers performed today, negative for DVT.  -Will start lovenox 40mg subcut for DVT ppx

## 2020-06-17 NOTE — CONSULT NOTE ADULT - ATTENDING COMMENTS
Pt seen on rounds this afternoon.  Glycemic control was obviously poor as outpatient despite large doses of insulin, now exacerbated further by infection and pain.  (Her pain appears to be related to the buttock abscess).  Glucoses have been in the 300 range since admission except for the drop to 176 pre-lunch today.  Received a total of 60 units Lantus last night without a noticeable decrease in blood sugar.    Will give 60 Lantus tonight, but may well add another dose of Lantus in the morning depending on glucoses overnight.  Will leave the pre-meal at 30 units TID for now, since most of her hyperglycemia appears to reflect insufficient basal insulin  Is markedly vitamin D deficient, will treat aggressively with high-dose vitamin D daily for the next 3 days
Agree with admission or patient for her other medical issues. As for complicated yeast agree with treatment as written above.   Would aim for non narcotic management given this is a recurrent issue for patient but would use narcotics in conjunction with treatment strategy as this condition is extremely uncomfortable for patient.   Will start regimen as listed and consider pain consult if needed.  Will continue to follow up on outpatient basis once cleared and pain well controlled.

## 2020-06-17 NOTE — PROGRESS NOTE ADULT - PROBLEM SELECTOR PLAN 6
Patient with history of type 2 Diabetes, does not recall insulin regimen  -Endo saw pt in May 2019, recommended mISS but at that point patient's glucose appeared to be better controlled   -lantus 30 QHS, lispro 8 premeal  -endo has been consulted Patient reports poorly controlled htn, worsened to 170s-180s over the last 3 weeks after she ran out of her labetalol  -c/w labetalol 300mg tid   -Continue to monitor bps and adjust regimen as needed  -Ensure pt has PCP follow up on discharge or has sufficient refills F: None  E: Replete for K<4 and Mag <2  N: Diet Carb Consistent, DASH/TLC  A: Lovenox   Dispo: [facility]  Code: FULL CODE Patient with history of type 2 Diabetes, does not recall insulin regimen. HbA1c 12.  -Endo saw pt in May 2019, recommended mISS but at that point patient's glucose appeared to be better controlled   -Endocrine consulted, will appreciate their recs regarding insulin management

## 2020-06-17 NOTE — PROGRESS NOTE ADULT - PROBLEM SELECTOR PLAN 4
Patient with boils and sinus tracts in axilla and groin, suspect underlying HS  -clindamycin topical  -can be followed up as outpatient  -Pain control with prn percocet see above #1 Pt endorsing dysuria and urinary hesitancy, PE w/ suprapubic tenderness, UA w/ +WBCs, +nitrites, and moderate leuk esterase. Uclx w/ e.coli, susceptibility pending. Pt with known hx of e.coli UTI in the past, sensitive to cephalosporins. Started on cefepime w/ ID approval for e.coli and pseudomonas coverage (given uncontrolled diabetes)  -C/w cefepime 2g IV BID (6/16- )  -Pt with multiple abx allergies, pre-medicate with benadryl prior to administration  -f/u uclx sensitivitis  -appreciate ID recs

## 2020-06-17 NOTE — PROGRESS NOTE ADULT - PROBLEM SELECTOR PLAN 7
Pt with self-reported history of DVT, though was never on any AC. No calf tenderness or unilateral swelling on exam  -F/u lower extremity duplex Patient with history of type 2 Diabetes, does not recall insulin regimen  -Endo saw pt in May 2019, recommended mISS but at that point patient's glucose appeared to be better controlled   -lantus 30 QHS, lispro 8 premeal  -endo has been consulted Known hx of hypertension, on labetalol 300mg TID at home. Pt without PCP, recently referred to [facility] for [facility] management. Pt likely uncontrolled, endorses home -170/100s.  -will start losartan 25mg QD for blood pressure control    #HLD  Pt with hyperlipidemia, no prior hx of statin. Started on atorvastatin 40mg QD during admission  -C/w atorvastatin 40mg QD

## 2020-06-17 NOTE — PROGRESS NOTE ADULT - SUBJECTIVE AND OBJECTIVE BOX
*** INCOMPLETE ***    OVERNIGHT EVENTS: As per overnight staff, there were no acute events overnight    INTERVAL EVENTS:    SUBJECTIVE HPI: Patient seen and examined at bedside. Patient resting comfortably, no complaints at this time. Patient denies: fever, chills, weakness, dizziness, headaches, changes in vision, chest pain, palpitations, shortness of breath, cough, N/V, diarrhea or constipation, dysuria, LE edema. ROS otherwise negative.      VITAL SIGNS:  Vital Signs Last 24 Hrs  T(C): 36.7 (17 Jun 2020 06:30), Max: 37.2 (16 Jun 2020 18:09)  T(F): 98 (17 Jun 2020 06:30), Max: 99 (17 Jun 2020 03:30)  HR: 97 (17 Jun 2020 06:30) (91 - 115)  BP: 124/66 (17 Jun 2020 06:30) (111/72 - 171/110)  BP(mean): --  RR: 18 (17 Jun 2020 06:30) (16 - 20)  SpO2: 99% (17 Jun 2020 06:30) (96% - 99%)      06-16-20 @ 07:01  -  06-17-20 @ 07:00  --------------------------------------------------------  IN: 1500 mL / OUT: 0 mL / NET: 1500 mL        PHYSICAL EXAM:  General: Comfortable, pleasant/anxious/agitated, Ill-appearing, well-nourished/frail/cachectic, comfortable / in distress  Neurological: AAOx3, no focal deficits  HEENT: NC/AT; EOMI, PERRL, clear conjunctiva, no nasal or oropharyngeal discharge or exudates, MMM  Neck: supple, no cervical or post-auricular lymphadenopathy  Cardiovascular: +S1/S2, no murmurs/rubs/gallops, RRR  Respiratory: CTA B/L, no diminished breath sounds, no wheezes/rales/rhonchi, no increased work of breathing or accessory muscle use  Gastrointestinal: soft, NT/ND; active BSx4 quadrants  Genitourinary: no suprapubic tenderness, no CVA tenderness  Extremities: WWP; no edema, clubbing or cyanosis  Vascular: 2+ radial, DP/PT pulses B/L  Skin: no rashes  Lines/Drains:       MEDICATIONS:  MEDICATIONS  (STANDING):  atorvastatin 40 milliGRAM(s) Oral at bedtime  cefepime   IVPB 2000 milliGRAM(s) IV Intermittent every 12 hours  dextrose 5%. 1000 milliLiter(s) (50 mL/Hr) IV Continuous <Continuous>  dextrose 50% Injectable 12.5 Gram(s) IV Push once  dextrose 50% Injectable 25 Gram(s) IV Push once  dextrose 50% Injectable 25 Gram(s) IV Push once  enoxaparin Injectable 40 milliGRAM(s) SubCutaneous every 12 hours  gabapentin 300 milliGRAM(s) Oral two times a day  insulin glargine Injectable (LANTUS) 35 Unit(s) SubCutaneous at bedtime  insulin lispro (HumaLOG) corrective regimen sliding scale   SubCutaneous Before meals and at bedtime  insulin lispro Injectable (HumaLOG) 30 Unit(s) SubCutaneous three times a day before meals  lactated ringers. 1000 milliLiter(s) (180 mL/Hr) IV Continuous <Continuous>  vancomycin  IVPB 2000 milliGRAM(s) IV Intermittent every 12 hours    MEDICATIONS  (PRN):  acetaminophen   Tablet .. 650 milliGRAM(s) Oral every 6 hours PRN Temp greater or equal to 38C (100.4F), Mild Pain (1 - 3)  dextrose 40% Gel 15 Gram(s) Oral once PRN Blood Glucose LESS THAN 70 milliGRAM(s)/deciliter  diphenhydrAMINE 25 milliGRAM(s) Oral every 4 hours PRN Rash and/or Itching  diphenhydrAMINE 25 milliGRAM(s) Oral once PRN Rash and/or Itching  glucagon  Injectable 1 milliGRAM(s) IntraMuscular once PRN Glucose LESS THAN 70 milligrams/deciliter  ibuprofen  Tablet. 400 milliGRAM(s) Oral every 6 hours PRN Moderate Pain (4 - 6)  lidocaine 5% Ointment 1 Application(s) Topical four times a day PRN Pain      ALLERGIES/INTOLERANCES:  Allergies    amoxicillin (Unknown)  clindamycin (Pruritus)  iodine containing compounds (Hives; Anaphylaxis)  penicillin (Hives)  shellfish. (Hives; Anaphylaxis)    Intolerances    Bactrim I.V. (Rash (Mod to Severe))      LABS:                        11.3   9.54  )-----------( 283      ( 17 Jun 2020 05:15 )             36.1     06-17    136  |  104  |  4<L>  ----------------------------<  371<H>  3.8   |  23  |  0.58    Ca    7.6<L>      17 Jun 2020 05:15  Phos  1.6     06-17    TPro  5.7<L>  /  Alb  2.9<L>  /  TBili  <0.2  /  DBili  x   /  AST  58<H>  /  ALT  35  /  AlkPhos  128<H>  06-17    PT/INR - ( 16 Jun 2020 09:29 )   PT: 13.1 sec;   INR: 1.14     PTT - ( 16 Jun 2020 09:29 )  PTT:29.0 sec    CAPILLARY BLOOD GLUCOSE  POCT Blood Glucose.: 350 mg/dL (17 Jun 2020 06:26)      Urinalysis Basic - ( 16 Jun 2020 09:30 )  Color: Yellow / Appearance: SL Cloudy / SG: >=1.030 / pH: x  Gluc: x / Ketone: Trace mg/dL  / Bili: Negative / Urobili: 0.2 E.U./dL   Blood: x / Protein: 30 mg/dL / Nitrite: POSITIVE   Leuk Esterase: Trace / RBC: < 5 /HPF / WBC > 10 /HPF   Sq Epi: x / Non Sq Epi: Moderate /HPF / Bacteria: Present /HPF      Microbiology:  Culture - Other (collected 16 Jun 2020 12:30)  Source: .Other ulcerative wound Rt. perineum  Gram Stain (16 Jun 2020 17:07):    No organisms seen    Rare WBC's    Culture - Blood (collected 16 Jun 2020 12:16)  Source: .Blood Blood  Preliminary Report (17 Jun 2020 01:00):    No growth at 12 hours    Culture - Blood (collected 16 Jun 2020 12:16)  Source: .Blood Blood  Preliminary Report (17 Jun 2020 01:00):    No growth at 12 hours        RADIOLOGY, EKG AND ADDITIONAL TESTS: Reviewed. *** INCOMPLETE ***    OVERNIGHT EVENTS: As per overnight staff, there were no acute events overnight    INTERVAL EVENTS:    SUBJECTIVE HPI: Patient seen and examined at bedside. Patient resting comfortably, no complaints at this time. Patient denies: fever, chills, weakness, dizziness, headaches, changes in vision, chest pain, palpitations, shortness of breath, cough, N/V, diarrhea or constipation, dysuria, LE edema. ROS otherwise negative.      VITAL SIGNS:  Vital Signs Last 24 Hrs  T(C): 36.7 (17 Jun 2020 06:30), Max: 37.2 (16 Jun 2020 18:09)  T(F): 98 (17 Jun 2020 06:30), Max: 99 (17 Jun 2020 03:30)  HR: 97 (17 Jun 2020 06:30) (91 - 115)  BP: 124/66 (17 Jun 2020 06:30) (111/72 - 171/110)  BP(mean): --  RR: 18 (17 Jun 2020 06:30) (16 - 20)  SpO2: 99% (17 Jun 2020 06:30) (96% - 99%)      06-16-20 @ 07:01  -  06-17-20 @ 07:00  --------------------------------------------------------  IN: 1500 mL / OUT: 0 mL / NET: 1500 mL      PHYSICAL EXAM:  General: obese female, pleasant, uncomfortable in moderate distress due to pain    Neurological: AAOx3, no focal deficits  HEENT: NC/AT; EOMI, PERRL, clear conjunctiva, no nasal or oropharyngeal discharge or exudates, MMM  Neck: supple, no cervical or post-auricular lymphadenopathy  Cardiovascular: +S1/S2, no murmurs/rubs/gallops, RRR  Respiratory: CTA B/L, no diminished breath sounds, no wheezes/rales/rhonchi, no increased work of breathing or accessory muscle use  Gastrointestinal: soft, NT/ND; active BSx4 quadrants  Genitourinary: no suprapubic tenderness, no CVA tenderness  Extremities: WWP; no edema, clubbing or cyanosis  Vascular: 2+ radial, DP/PT pulses B/L  Skin: no rashes  Lines/Drains:       MEDICATIONS:  MEDICATIONS  (STANDING):  atorvastatin 40 milliGRAM(s) Oral at bedtime  cefepime   IVPB 2000 milliGRAM(s) IV Intermittent every 12 hours  dextrose 5%. 1000 milliLiter(s) (50 mL/Hr) IV Continuous <Continuous>  dextrose 50% Injectable 12.5 Gram(s) IV Push once  dextrose 50% Injectable 25 Gram(s) IV Push once  dextrose 50% Injectable 25 Gram(s) IV Push once  enoxaparin Injectable 40 milliGRAM(s) SubCutaneous every 12 hours  gabapentin 300 milliGRAM(s) Oral two times a day  insulin glargine Injectable (LANTUS) 35 Unit(s) SubCutaneous at bedtime  insulin lispro (HumaLOG) corrective regimen sliding scale   SubCutaneous Before meals and at bedtime  insulin lispro Injectable (HumaLOG) 30 Unit(s) SubCutaneous three times a day before meals  lactated ringers. 1000 milliLiter(s) (180 mL/Hr) IV Continuous <Continuous>  vancomycin  IVPB 2000 milliGRAM(s) IV Intermittent every 12 hours    MEDICATIONS  (PRN):  acetaminophen   Tablet .. 650 milliGRAM(s) Oral every 6 hours PRN Temp greater or equal to 38C (100.4F), Mild Pain (1 - 3)  dextrose 40% Gel 15 Gram(s) Oral once PRN Blood Glucose LESS THAN 70 milliGRAM(s)/deciliter  diphenhydrAMINE 25 milliGRAM(s) Oral every 4 hours PRN Rash and/or Itching  diphenhydrAMINE 25 milliGRAM(s) Oral once PRN Rash and/or Itching  glucagon  Injectable 1 milliGRAM(s) IntraMuscular once PRN Glucose LESS THAN 70 milligrams/deciliter  ibuprofen  Tablet. 400 milliGRAM(s) Oral every 6 hours PRN Moderate Pain (4 - 6)  lidocaine 5% Ointment 1 Application(s) Topical four times a day PRN Pain      ALLERGIES/INTOLERANCES:  Allergies    amoxicillin (Unknown)  clindamycin (Pruritus)  iodine containing compounds (Hives; Anaphylaxis)  penicillin (Hives)  shellfish. (Hives; Anaphylaxis)    Intolerances    Bactrim I.V. (Rash (Mod to Severe))      LABS:                        11.3   9.54  )-----------( 283      ( 17 Jun 2020 05:15 )             36.1     06-17    136  |  104  |  4<L>  ----------------------------<  371<H>  3.8   |  23  |  0.58    Ca    7.6<L>      17 Jun 2020 05:15  Phos  1.6     06-17    TPro  5.7<L>  /  Alb  2.9<L>  /  TBili  <0.2  /  DBili  x   /  AST  58<H>  /  ALT  35  /  AlkPhos  128<H>  06-17    PT/INR - ( 16 Jun 2020 09:29 )   PT: 13.1 sec;   INR: 1.14     PTT - ( 16 Jun 2020 09:29 )  PTT:29.0 sec    CAPILLARY BLOOD GLUCOSE  POCT Blood Glucose.: 350 mg/dL (17 Jun 2020 06:26)      Urinalysis Basic - ( 16 Jun 2020 09:30 )  Color: Yellow / Appearance: SL Cloudy / SG: >=1.030 / pH: x  Gluc: x / Ketone: Trace mg/dL  / Bili: Negative / Urobili: 0.2 E.U./dL   Blood: x / Protein: 30 mg/dL / Nitrite: POSITIVE   Leuk Esterase: Trace / RBC: < 5 /HPF / WBC > 10 /HPF   Sq Epi: x / Non Sq Epi: Moderate /HPF / Bacteria: Present /HPF      Microbiology:  Culture - Other (collected 16 Jun 2020 12:30)  Source: .Other ulcerative wound Rt. perineum  Gram Stain (16 Jun 2020 17:07):    No organisms seen    Rare WBC's    Culture - Blood (collected 16 Jun 2020 12:16)  Source: .Blood Blood  Preliminary Report (17 Jun 2020 01:00):    No growth at 12 hours    Culture - Blood (collected 16 Jun 2020 12:16)  Source: .Blood Blood  Preliminary Report (17 Jun 2020 01:00):    No growth at 12 hours        RADIOLOGY, EKG AND ADDITIONAL TESTS: Reviewed. *** INCOMPLETE ***    OVERNIGHT EVENTS: As per overnight staff, there were no acute events overnight    INTERVAL EVENTS:    SUBJECTIVE HPI: Patient seen and examined at bedside. Patient resting comfortably, no complaints at this time. Patient denies: fever, chills, weakness, dizziness, headaches, changes in vision, chest pain, palpitations, shortness of breath, cough, N/V, diarrhea or constipation, dysuria, LE edema. ROS otherwise negative.      VITAL SIGNS:  Vital Signs Last 24 Hrs  T(C): 36.7 (17 Jun 2020 06:30), Max: 37.2 (16 Jun 2020 18:09)  T(F): 98 (17 Jun 2020 06:30), Max: 99 (17 Jun 2020 03:30)  HR: 97 (17 Jun 2020 06:30) (91 - 115)  BP: 124/66 (17 Jun 2020 06:30) (111/72 - 171/110)  BP(mean): --  RR: 18 (17 Jun 2020 06:30) (16 - 20)  SpO2: 99% (17 Jun 2020 06:30) (96% - 99%)      06-16-20 @ 07:01  -  06-17-20 @ 07:00  --------------------------------------------------------  IN: 1500 mL / OUT: 0 mL / NET: 1500 mL      PHYSICAL EXAM:  General: obese female, pleasant, uncomfortable in moderate distress due to pain  HEENT: NC/AT; clear conjunctiva, no nasal or oropharyngeal discharge or exudates, dry mucous membranes  Neck: supple, no cervical or post-auricular lymphadenopathy  Cardiovascular: +S1/S2, no murmurs/rubs/gallops, RRR  Respiratory: CTA B/L, no diminished breath sounds, no increased work of breathing or accessory muscle use  Gastrointestinal: soft, obese abd, NT/ND; active BSx4 quadrants, no hepatosplenomegaly palpated  Genitourinary: no suprapubic tenderness, no CVA tenderness  Extremities: WWP; no edema, clubbing or cyanosis  Vascular: 2+ radial, DP/PT pulses B/L  Skin: no rashes  Neurologic: AAOx3     -Motor: Normal bulk and tone. Upper Extremity strength: L 3/5, R 5/5 Strength bilateral upper extremity 5/5, bilateral lower extremities 5/5.      -Sensation UE intact    Morbidly obese female, appears uncomfortable, sitting at edge of bed  Head: NC/AT  Eyes: PERRL, EOMI, clear conjunctiva, sclera anicteric  ENT: no nasal discharge; uvula midline, no oropharyngeal erythema or exudates; slightly dry mucous membranes  Respiratory: CTA B/L; no W/R/R  Cardiac: +S1/S2; RRR; no M/R/G  Gastrointestinal: Obese, +BSx4, soft, non-distended w/o rebound or guarding  Genitourinary: mild suprapubic tenderness, no CVA tenderness  GYN: Diffuse erythematous rash in groin, vulva, and joey-anal area, thick white vaginal discharge  Extremities: WWP, no clubbing or cyanosis; +1 pitting edema in b/l LE, calf tenderness L>R, Becki sign negative  Vascular: 2+ DP/PT pulses B/L  Neurologic: AAOx3; CNII-XII grossly intact; decreased  strength in L hand 3/5 compared to R hand 5/5; sensation grossly intact.      MEDICATIONS:  MEDICATIONS  (STANDING):  atorvastatin 40 milliGRAM(s) Oral at bedtime  cefepime   IVPB 2000 milliGRAM(s) IV Intermittent every 12 hours  dextrose 5%. 1000 milliLiter(s) (50 mL/Hr) IV Continuous <Continuous>  dextrose 50% Injectable 12.5 Gram(s) IV Push once  dextrose 50% Injectable 25 Gram(s) IV Push once  dextrose 50% Injectable 25 Gram(s) IV Push once  enoxaparin Injectable 40 milliGRAM(s) SubCutaneous every 12 hours  gabapentin 300 milliGRAM(s) Oral two times a day  insulin glargine Injectable (LANTUS) 35 Unit(s) SubCutaneous at bedtime  insulin lispro (HumaLOG) corrective regimen sliding scale   SubCutaneous Before meals and at bedtime  insulin lispro Injectable (HumaLOG) 30 Unit(s) SubCutaneous three times a day before meals  lactated ringers. 1000 milliLiter(s) (180 mL/Hr) IV Continuous <Continuous>  vancomycin  IVPB 2000 milliGRAM(s) IV Intermittent every 12 hours    MEDICATIONS  (PRN):  acetaminophen   Tablet .. 650 milliGRAM(s) Oral every 6 hours PRN Temp greater or equal to 38C (100.4F), Mild Pain (1 - 3)  dextrose 40% Gel 15 Gram(s) Oral once PRN Blood Glucose LESS THAN 70 milliGRAM(s)/deciliter  diphenhydrAMINE 25 milliGRAM(s) Oral every 4 hours PRN Rash and/or Itching  diphenhydrAMINE 25 milliGRAM(s) Oral once PRN Rash and/or Itching  glucagon  Injectable 1 milliGRAM(s) IntraMuscular once PRN Glucose LESS THAN 70 milligrams/deciliter  ibuprofen  Tablet. 400 milliGRAM(s) Oral every 6 hours PRN Moderate Pain (4 - 6)  lidocaine 5% Ointment 1 Application(s) Topical four times a day PRN Pain      ALLERGIES/INTOLERANCES:  Allergies    amoxicillin (Unknown)  clindamycin (Pruritus)  iodine containing compounds (Hives; Anaphylaxis)  penicillin (Hives)  shellfish. (Hives; Anaphylaxis)    Intolerances    Bactrim I.V. (Rash (Mod to Severe))      LABS:                        11.3   9.54  )-----------( 283      ( 17 Jun 2020 05:15 )             36.1     06-17    136  |  104  |  4<L>  ----------------------------<  371<H>  3.8   |  23  |  0.58    Ca    7.6<L>      17 Jun 2020 05:15  Phos  1.6     06-17    TPro  5.7<L>  /  Alb  2.9<L>  /  TBili  <0.2  /  DBili  x   /  AST  58<H>  /  ALT  35  /  AlkPhos  128<H>  06-17    PT/INR - ( 16 Jun 2020 09:29 )   PT: 13.1 sec;   INR: 1.14     PTT - ( 16 Jun 2020 09:29 )  PTT:29.0 sec    CAPILLARY BLOOD GLUCOSE  POCT Blood Glucose.: 350 mg/dL (17 Jun 2020 06:26)      Urinalysis Basic - ( 16 Jun 2020 09:30 )  Color: Yellow / Appearance: SL Cloudy / SG: >=1.030 / pH: x  Gluc: x / Ketone: Trace mg/dL  / Bili: Negative / Urobili: 0.2 E.U./dL   Blood: x / Protein: 30 mg/dL / Nitrite: POSITIVE   Leuk Esterase: Trace / RBC: < 5 /HPF / WBC > 10 /HPF   Sq Epi: x / Non Sq Epi: Moderate /HPF / Bacteria: Present /HPF      Microbiology:  Culture - Other (collected 16 Jun 2020 12:30)  Source: .Other ulcerative wound Rt. perineum  Gram Stain (16 Jun 2020 17:07):    No organisms seen    Rare WBC's    Culture - Blood (collected 16 Jun 2020 12:16)  Source: .Blood Blood  Preliminary Report (17 Jun 2020 01:00):    No growth at 12 hours    Culture - Blood (collected 16 Jun 2020 12:16)  Source: .Blood Blood  Preliminary Report (17 Jun 2020 01:00):    No growth at 12 hours        RADIOLOGY, EKG AND ADDITIONAL TESTS: Reviewed. *** INCOMPLETE ***    OVERNIGHT EVENTS: As per overnight staff, there were no acute events overnight    SUBJECTIVE HPI: Patient seen and examined at bedside. Patient complaining of severe pain. She is concerned with her overall health and distressed about her chronic medical problems. She endorses dysuria and urinary hesitancy. Has not had a BM in 3 days because of the severity of the pain. Furthermore, she has been endorsing headaches since her last discharge, unilateral eye pain (periorbital) with associated vision changes occurring both with headaches and without. She has L wrist pain without any associated trauma. She has calf pain with swelling, L>R. ROS negative for fevers/chills, nausea/vomiting, hematuria, chest pain, shortness of breath.    VITAL SIGNS:  Vital Signs Last 24 Hrs  T(C): 36.7 (17 Jun 2020 06:30), Max: 37.2 (16 Jun 2020 18:09)  T(F): 98 (17 Jun 2020 06:30), Max: 99 (17 Jun 2020 03:30)  HR: 97 (17 Jun 2020 06:30) (91 - 115)  BP: 124/66 (17 Jun 2020 06:30) (111/72 - 171/110)  BP(mean): --  RR: 18 (17 Jun 2020 06:30) (16 - 20)  SpO2: 99% (17 Jun 2020 06:30) (96% - 99%)      06-16-20 @ 07:01  -  06-17-20 @ 07:00  --------------------------------------------------------  IN: 1500 mL / OUT: 0 mL / NET: 1500 mL      PHYSICAL EXAM:  General: obese female, pleasant, uncomfortable in moderate distress due to pain  HEENT: NC/AT; clear conjunctiva, no nasal or oropharyngeal discharge or exudates, dry mucous membranes  Neck: supple, no cervical or post-auricular lymphadenopathy  Cardiovascular: +S1/S2, no murmurs/rubs/gallops, RRR  Respiratory: CTA B/L, no diminished breath sounds, no increased work of breathing or accessory muscle use  Gastrointestinal: soft, obese abd, NT/ND; active BSx4 quadrants, no hepatosplenomegaly palpated  Genitourinary: no suprapubic tenderness, no CVA tenderness  Extremities: WWP; calf tenderness L>R, robb sign negative  Vascular: 2+ radial, DP/PT pulses B/L  Skin: indurated erythemaous perineum and vulvar region, white thick plaques in perineum, ruptured bullae without pus  Neurologic: AAOx3     -Motor: Normal bulk and tone. Upper Extremity strength: L 3/5, R 5/5 Strength bilateral upper extremity 5/5, bilateral lower extremities 5/5.      -Sensation UE intact      MEDICATIONS:  MEDICATIONS  (STANDING):  atorvastatin 40 milliGRAM(s) Oral at bedtime  cefepime   IVPB 2000 milliGRAM(s) IV Intermittent every 12 hours  dextrose 5%. 1000 milliLiter(s) (50 mL/Hr) IV Continuous <Continuous>  dextrose 50% Injectable 12.5 Gram(s) IV Push once  dextrose 50% Injectable 25 Gram(s) IV Push once  dextrose 50% Injectable 25 Gram(s) IV Push once  enoxaparin Injectable 40 milliGRAM(s) SubCutaneous every 12 hours  gabapentin 300 milliGRAM(s) Oral two times a day  insulin glargine Injectable (LANTUS) 35 Unit(s) SubCutaneous at bedtime  insulin lispro (HumaLOG) corrective regimen sliding scale   SubCutaneous Before meals and at bedtime  insulin lispro Injectable (HumaLOG) 30 Unit(s) SubCutaneous three times a day before meals  lactated ringers. 1000 milliLiter(s) (180 mL/Hr) IV Continuous <Continuous>  vancomycin  IVPB 2000 milliGRAM(s) IV Intermittent every 12 hours    MEDICATIONS  (PRN):  acetaminophen   Tablet .. 650 milliGRAM(s) Oral every 6 hours PRN Temp greater or equal to 38C (100.4F), Mild Pain (1 - 3)  dextrose 40% Gel 15 Gram(s) Oral once PRN Blood Glucose LESS THAN 70 milliGRAM(s)/deciliter  diphenhydrAMINE 25 milliGRAM(s) Oral every 4 hours PRN Rash and/or Itching  diphenhydrAMINE 25 milliGRAM(s) Oral once PRN Rash and/or Itching  glucagon  Injectable 1 milliGRAM(s) IntraMuscular once PRN Glucose LESS THAN 70 milligrams/deciliter  ibuprofen  Tablet. 400 milliGRAM(s) Oral every 6 hours PRN Moderate Pain (4 - 6)  lidocaine 5% Ointment 1 Application(s) Topical four times a day PRN Pain      ALLERGIES/INTOLERANCES:  Allergies    amoxicillin (Unknown)  clindamycin (Pruritus)  iodine containing compounds (Hives; Anaphylaxis)  penicillin (Hives)  shellfish. (Hives; Anaphylaxis)    Intolerances    Bactrim I.V. (Rash (Mod to Severe))      LABS:                        11.3   9.54  )-----------( 283      ( 17 Jun 2020 05:15 )             36.1     06-17    136  |  104  |  4<L>  ----------------------------<  371<H>  3.8   |  23  |  0.58    Ca    7.6<L>      17 Jun 2020 05:15  Phos  1.6     06-17    TPro  5.7<L>  /  Alb  2.9<L>  /  TBili  <0.2  /  DBili  x   /  AST  58<H>  /  ALT  35  /  AlkPhos  128<H>  06-17    PT/INR - ( 16 Jun 2020 09:29 )   PT: 13.1 sec;   INR: 1.14     PTT - ( 16 Jun 2020 09:29 )  PTT:29.0 sec    CAPILLARY BLOOD GLUCOSE  POCT Blood Glucose.: 350 mg/dL (17 Jun 2020 06:26)      Urinalysis Basic - ( 16 Jun 2020 09:30 )  Color: Yellow / Appearance: SL Cloudy / SG: >=1.030 / pH: x  Gluc: x / Ketone: Trace mg/dL  / Bili: Negative / Urobili: 0.2 E.U./dL   Blood: x / Protein: 30 mg/dL / Nitrite: POSITIVE   Leuk Esterase: Trace / RBC: < 5 /HPF / WBC > 10 /HPF   Sq Epi: x / Non Sq Epi: Moderate /HPF / Bacteria: Present /HPF      Microbiology:  Culture - Other (collected 16 Jun 2020 12:30)  Source: .Other ulcerative wound Rt. perineum  Gram Stain (16 Jun 2020 17:07):    No organisms seen    Rare WBC's    Culture - Blood (collected 16 Jun 2020 12:16)  Source: .Blood Blood  Preliminary Report (17 Jun 2020 01:00):    No growth at 12 hours    Culture - Blood (collected 16 Jun 2020 12:16)  Source: .Blood Blood  Preliminary Report (17 Jun 2020 01:00):    No growth at 12 hours      RADIOLOGY, EKG AND ADDITIONAL TESTS: Reviewed. OVERNIGHT EVENTS: As per overnight staff, there were no acute events overnight    SUBJECTIVE HPI: Patient seen and examined at bedside. Patient complaining of severe pain. She is concerned with her overall health and distressed about her chronic medical problems. She endorses dysuria and urinary hesitancy. Has not had a BM in 3 days because of the severity of the pain. Furthermore, she has been endorsing headaches since her last discharge, unilateral eye pain (periorbital) with associated vision changes occurring both with headaches and without. She has L wrist pain without any associated trauma. She has calf pain with swelling, L>R. ROS negative for fevers/chills, nausea/vomiting, hematuria, chest pain, shortness of breath.    VITAL SIGNS:  Vital Signs Last 24 Hrs  T(C): 36.7 (17 Jun 2020 06:30), Max: 37.2 (16 Jun 2020 18:09)  T(F): 98 (17 Jun 2020 06:30), Max: 99 (17 Jun 2020 03:30)  HR: 97 (17 Jun 2020 06:30) (91 - 115)  BP: 124/66 (17 Jun 2020 06:30) (111/72 - 171/110)  BP(mean): --  RR: 18 (17 Jun 2020 06:30) (16 - 20)  SpO2: 99% (17 Jun 2020 06:30) (96% - 99%)      06-16-20 @ 07:01  -  06-17-20 @ 07:00  --------------------------------------------------------  IN: 1500 mL / OUT: 0 mL / NET: 1500 mL      PHYSICAL EXAM:  General: obese female, pleasant, uncomfortable in moderate distress due to pain  HEENT: NC/AT; clear conjunctiva, no nasal or oropharyngeal discharge or exudates, dry mucous membranes  Neck: supple, no cervical or post-auricular lymphadenopathy  Cardiovascular: +S1/S2, no murmurs/rubs/gallops, RRR  Respiratory: CTA B/L, no diminished breath sounds, no increased work of breathing or accessory muscle use  Gastrointestinal: soft, obese abd, NT/ND; active BSx4 quadrants, no hepatosplenomegaly palpated  Genitourinary: no suprapubic tenderness, no CVA tenderness  Extremities: WWP; calf tenderness L>R, robb sign negative  Vascular: 2+ radial, DP/PT pulses B/L  Skin: indurated erythemaous perineum and vulvar region, white thick plaques in perineum, ruptured bullae without pus  Neurologic: AAOx3     -Motor: Normal bulk and tone. Upper Extremity strength: L 3/5, R 5/5 Strength bilateral upper extremity 5/5, bilateral lower extremities 5/5.      -Sensation UE intact      MEDICATIONS:  MEDICATIONS  (STANDING):  atorvastatin 40 milliGRAM(s) Oral at bedtime  cefepime   IVPB 2000 milliGRAM(s) IV Intermittent every 12 hours  dextrose 5%. 1000 milliLiter(s) (50 mL/Hr) IV Continuous <Continuous>  dextrose 50% Injectable 12.5 Gram(s) IV Push once  dextrose 50% Injectable 25 Gram(s) IV Push once  dextrose 50% Injectable 25 Gram(s) IV Push once  enoxaparin Injectable 40 milliGRAM(s) SubCutaneous every 12 hours  gabapentin 300 milliGRAM(s) Oral two times a day  insulin glargine Injectable (LANTUS) 35 Unit(s) SubCutaneous at bedtime  insulin lispro (HumaLOG) corrective regimen sliding scale   SubCutaneous Before meals and at bedtime  insulin lispro Injectable (HumaLOG) 30 Unit(s) SubCutaneous three times a day before meals  lactated ringers. 1000 milliLiter(s) (180 mL/Hr) IV Continuous <Continuous>  vancomycin  IVPB 2000 milliGRAM(s) IV Intermittent every 12 hours    MEDICATIONS  (PRN):  acetaminophen   Tablet .. 650 milliGRAM(s) Oral every 6 hours PRN Temp greater or equal to 38C (100.4F), Mild Pain (1 - 3)  dextrose 40% Gel 15 Gram(s) Oral once PRN Blood Glucose LESS THAN 70 milliGRAM(s)/deciliter  diphenhydrAMINE 25 milliGRAM(s) Oral every 4 hours PRN Rash and/or Itching  diphenhydrAMINE 25 milliGRAM(s) Oral once PRN Rash and/or Itching  glucagon  Injectable 1 milliGRAM(s) IntraMuscular once PRN Glucose LESS THAN 70 milligrams/deciliter  ibuprofen  Tablet. 400 milliGRAM(s) Oral every 6 hours PRN Moderate Pain (4 - 6)  lidocaine 5% Ointment 1 Application(s) Topical four times a day PRN Pain      ALLERGIES/INTOLERANCES:  Allergies    amoxicillin (Unknown)  clindamycin (Pruritus)  iodine containing compounds (Hives; Anaphylaxis)  penicillin (Hives)  shellfish. (Hives; Anaphylaxis)    Intolerances    Bactrim I.V. (Rash (Mod to Severe))      LABS:                        11.3   9.54  )-----------( 283      ( 17 Jun 2020 05:15 )             36.1     06-17    136  |  104  |  4<L>  ----------------------------<  371<H>  3.8   |  23  |  0.58    Ca    7.6<L>      17 Jun 2020 05:15  Phos  1.6     06-17    TPro  5.7<L>  /  Alb  2.9<L>  /  TBili  <0.2  /  DBili  x   /  AST  58<H>  /  ALT  35  /  AlkPhos  128<H>  06-17    PT/INR - ( 16 Jun 2020 09:29 )   PT: 13.1 sec;   INR: 1.14     PTT - ( 16 Jun 2020 09:29 )  PTT:29.0 sec    CAPILLARY BLOOD GLUCOSE  POCT Blood Glucose.: 350 mg/dL (17 Jun 2020 06:26)      Urinalysis Basic - ( 16 Jun 2020 09:30 )  Color: Yellow / Appearance: SL Cloudy / SG: >=1.030 / pH: x  Gluc: x / Ketone: Trace mg/dL  / Bili: Negative / Urobili: 0.2 E.U./dL   Blood: x / Protein: 30 mg/dL / Nitrite: POSITIVE   Leuk Esterase: Trace / RBC: < 5 /HPF / WBC > 10 /HPF   Sq Epi: x / Non Sq Epi: Moderate /HPF / Bacteria: Present /HPF      Microbiology:  Culture - Other (collected 16 Jun 2020 12:30)  Source: .Other ulcerative wound Rt. perineum  Gram Stain (16 Jun 2020 17:07):    No organisms seen    Rare WBC's    Culture - Blood (collected 16 Jun 2020 12:16)  Source: .Blood Blood  Preliminary Report (17 Jun 2020 01:00):    No growth at 12 hours    Culture - Blood (collected 16 Jun 2020 12:16)  Source: .Blood Blood  Preliminary Report (17 Jun 2020 01:00):    No growth at 12 hours      RADIOLOGY, EKG AND ADDITIONAL TESTS: Reviewed.

## 2020-06-17 NOTE — PROGRESS NOTE ADULT - SUBJECTIVE AND OBJECTIVE BOX
GYN PROGRESS NOTE    Patient evaluated at the bedside. No acute events overnight. Patient continues to endorse severe perineal pain although slightly improved from yesterday, now with drainage of fluid and blood over night from her buttock.   Denies CP/SOB/dizziness/nausea/vomiting/abdominal pain/calf pain.      O:   T(C): 36.7 (06-17-20 @ 06:30), Max: 37.2 (06-16-20 @ 18:09)  HR: 97 (06-17-20 @ 06:30) (91 - 115)  BP: 124/66 (06-17-20 @ 06:30) (111/72 - 171/110)  RR: 18 (06-17-20 @ 06:30) (16 - 20)  SpO2: 99% (06-17-20 @ 06:30) (96% - 99%)  Wt(kg): --    GEN: patient appears upset  ABD: soft, nontender nondistended  Pelvic: significant eyrthema of skin from mons pubic extending into perianal region and inner thighs, diffusely tender to palpation, serosanguinous drainage in gluteal cleft, difficulty exposing due to pain.   EXT: no calf tenderness        06-16 @ 07:01  -  06-17 @ 07:00  --------------------------------------------------------  IN: 1500 mL / OUT: 0 mL / NET: 1500 mL

## 2020-06-17 NOTE — PROGRESS NOTE ADULT - PROBLEM SELECTOR PLAN 1
Patient presenting with severe erythema, tenderness, and swelling of vulvar, vaginal, and perianal area, in the setting of uncontrolled diabetes. 1/4 SIRS criteria on admission (tachycardia, likely 2/2 pain).   -ID consulted recommend Fluconazole 400 gm IV QD as appears more fungal in nature. will continue to monitor if status worsens will consider daptomycin as patient has allergies to many things including vanc  -As patient allergic to ampicillin and penicillin, and had reaction to Bactrim several weeks ago, and clindamycin overnight  -will hold off on imaging as low utility for noncon CT, and patient is allergic to contrast. cannot give steroids as allergy prophylaxis given uncontrolled sugars  f not concerning, consider switching to po clindamycin  -OB gyn saw pt in ED, took vaginal swab. F/u cultures  -Pain control with prn percocet q4 hours, increase if necessary P/w 2/4 SIRS (WBC, HR), w/ lactate 5.4 Etiology of severe sepsis likely 2/2 vulvovaginal cellulitis, candidiasis, and UTI.   -S/p 4L NS, with improvement in lactate. No need to continue trending lactate as shown improvement. If hemodynamically unstable, will consider repeat. Leukocytosis resolved, HR now wnl.  -Management of infection per below

## 2020-06-17 NOTE — PROGRESS NOTE ADULT - ASSESSMENT
Assessment and Plan:   41 yo  with PMH of uncontrolled DM2 and cHTN as well as multipe post operative wound infections requiring IR drainage and several abdominal washouts with general surgery presenting with vaginal and perianal pain, found to have vaginal cellulitis likely 2/2 complicated candidiasis. A/P  39yo  with PMH of uncontrolled DM2 and cHTN presenting to ED for 2 weeks of worsening vaginal and perianal pain, vaginal discharge and dysuria. In triage, patient noted to have elevated BPs 171/100 (patient reports at home they were as high as 240s) as well as fasting . Per conversation with ED attending patient stated on vancomycin and cefepime and to be admitted to medicine for optimization of medical comorbidities and treatment of complicated yeast infection and superimposed cellulitis. GYN to defer to primary team management, will follow up cultures of cervix, and ulcer as well as vaginitis panel    # Complicated vaginitis  - Complicated vaginitis given patient is immunocompromised due to uncontrolled diabetes, severity of symptoms as well as history of recurrent candidiasis. Fluconazole 150mg PO q72hrs was started yesterday. ID following appreciate their recommendations.   Please keep area dry and add nystatin powder specifically on mons, groin and inguinal folds   - Pain management: Recommend  5% topical lidocaine to affected area,  P.O Dilaudid 2-4mg q6 hours and P.O Gabapentin for pain control (possible Lyrica if Gabapentin doesn't help).   - Will follow up cultures of cervix, ulcer, urine, blood as well as vaginitis panel and urine G/C     - GYN will follow

## 2020-06-17 NOTE — PROGRESS NOTE ADULT - PROBLEM SELECTOR PLAN 2
Seen by ob gyn in ED who suspect vaginitis is 2/2 yeast infection  -s/p one dose of fluconazole, recommending second dose in one week if symptoms persist  -F/u vaginal swab cultures that were sent  -F/u gyn recs Patient presenting with severe erythema, tenderness, and swelling of vulvar, vaginal, and perianal area, in the setting of uncontrolled diabetes. 1/4 SIRS criteria on admission (tachycardia, likely 2/2 pain).   -ID consulted recommend Fluconazole 400 gm IV QD as appears more fungal in nature. will continue to monitor if status worsens will consider daptomycin as patient has allergies to many things including vanc  -As patient allergic to ampicillin and penicillin, and had reaction to Bactrim several weeks ago, and clindamycin overnight  -will hold off on imaging as low utility for noncon CT, and patient is allergic to contrast. cannot give steroids as allergy prophylaxis given uncontrolled sugars  f not concerning, consider switching to po clindamycin  -OB gyn saw pt in ED, took vaginal swab. F/u cultures  -Pain control with prn percocet q4 hours, increase if necessary Pt endorses symptoms resolved for 2-3wks after treatment before recurrence. Physical exam w/ erythema, several white plaques, and ruptured bullae. Hx s/f prior admission for vulvovaginal candiasis, treated with fluconazole and clotrimazole. OBGYN consulted, their assessment is that pt has complicated vaginitis due to immunocompromised status (uncontrolled DM), sx severity, and recurrent candidiasis.  Plan:  -Per OBGYN: fluconazole 150mg PO q72hrs for 3 doses (6/16- ) followed by 150mg qweekly x6mo, nystatin powder, and pain management  -F/u cervical clx, ulcer clx, vaginitis panel, and urine G/C

## 2020-06-17 NOTE — PROGRESS NOTE ADULT - ASSESSMENT
Patient is a 41 yo  with PMH of uncontrolled DM2 and HTN as well as a post operative wound infection requiring IR drainage and several abdominal washout of the wound site with surgery team presenting for several weeks of worsening vaginal pain, redness, and swelling, admitted for vaginal cellulitis. Patient is a 39yo F, morbidly obese, with PMH of uncontrolled DM2, HTN, HLD, and recent admission for vulvovaginal candidiasis (treated with fluconazole, clotrimazole) and e.Coli UTI, who presented with 2wks of worsening vaginal pain, redness, and swelling, admitted for severe sepsis 2/2 vulvovaginal cellulitis and candidiasis w/ concomitant UTI.      INTERVAL EVENTS: pt w/ CT head for vision changes, headaches, and L arm weakness, resulted negative. Patient is a 41yo F, morbidly obese, with PMH of uncontrolled DM2, HTN, HLD, and recent admission for vulvovaginal candidiasis (treated with fluconazole, clotrimazole) and e.Coli UTI, who presented with 2wks of worsening vaginal pain, redness, and swelling, admitted for severe sepsis 2/2 vulvovaginal cellulitis and candidiasis w/ concomitant UTI.

## 2020-06-17 NOTE — CONSULT NOTE ADULT - SUBJECTIVE AND OBJECTIVE BOX
HPI: HPI: 40F  with last delivery in 2019 c/b post-operative wound infection of abdomen requiring IR drainage, with hx of multiple abdominal washouts for SSI requiring PICC line for abx & hernia repair w/ mesh numerous times (Dr. Hurtado) and other PMH of DVT, uncontrolled DM2, HTN, HLD, admitted to [facility] in March for vaginal yeast infection discharged on Fluconazole, who now presents with 2 week of worsening vaginal and perianal pain, itching, copious thick white discharge and dysuria. She has tried over the counter antifungal creams as well as lidocaine spray with some relief. She reports her BP and blood glucose have not been well controlled which she attributes to her pain. Patient denies fever, chills, chest pain, SOB, abdominal pain, nausea, vomiting, vaginal bleeding. On presentation to the ED, vitals were T 98.3, , /95, RR 18, SpO2 96% on RA. Labs n/f WBC 13.95, lactate 5.4, Na 129, CO2 20, AG 19, glucose 518, negative BHB, VBG with normal pH.  UA positive for specific gravity >1.030, nitrites, trace leuk esterase, >10 WBC, +bacteria.  COVID negative.  CTAP w/ 3 cm area of infiltration of subcutaneous fat in the inferior left buttock consistent with superficial phlegmonous change or perhaps early abscess, probably related to the dermal appendages. Does not appear to be drainable at this time. No fistulous connection to the anal canal.  GYN evaluated patient who antifungals and pain control.  Patient was given Fluconazole 150mg PO, Vancomycin/Cefepime, developed itching and therefore given Benadryl and Atarax, Dilaudid 0.5mg IVP x3, Morphine 4mg IV, Labetalol 10mg IVP, insulin 10u and 4L NS. Surgery and Gyn has been following the patient. plan is for a better examination. She was also c/o of some left arm and leg swelling/ weakness - Duplex and CT head has been ordered    Endocrine was consulted for management for her DM.     FSG & Insulin received:  Yesterday:  1100 AM  - 10 units regular insulin s/c  1400 FSg 368  2000 FSg - 388, lantus 30 units at 6 PM  2200  - 35 lantus + 10 lispro SS  Today:  pre-breakfast fs, 30 nutritional lispro   units+ 18   units lispro SS  pre-lunch fsg:  nutritional lispro   units+   units lispro SS    DM history:  patient was last seen in 2020 for the same c/o of vaginal infection. unclear what regimen she was on before. She was discharged on lantus 40 units QHS and lispro 14 TID - her FSg were not under control during that time and did not have enough time to control her FSG. However, she had a tele-visit with her PCP OP when she was having increased FSG. her insulin dose was increase to basaglar 35 units BID and admelog 30 uints TID    Age at Dx:  How dx:  Hx and duration of insulin:  Current Therapy:  Hx of hypoglycemia  Hx of DKA/HHS?    Home FSG:  Fasting  Lunch  Dinner  Bed    Hx of other regimens  Complications:  Outpatient Endo:    PMH & Surgical Hx:UTI  FH: hypertension  FH: diabetes mellitus  Pre-eclampsia  Abdominal hernia  Hernia  Obesity  Diabetes  Hyperlipidemia  Essential hypertension  Urinary tract infection  Perianal cellulitis  Cellulitis and abscess  Hyperlipidemia  Hyperlipidemia  Morbid obesity with BMI of 45.0-49.9, adult  Diabetes  Vaginal candidiasis  Severe sepsis  H/O exploratory laparotomy  H/O ventral hernia repair  H/O:   History of D&C      FH:  DM:  Thyroid:  Autoimmune:  Other:    SH:  Smoking  Etoh:  Recreational Drugs:  Social Life:    Current Meds:  acetaminophen   Tablet .. 650 milliGRAM(s) Oral every 6 hours PRN  atorvastatin 40 milliGRAM(s) Oral at bedtime  cefepime   IVPB 2000 milliGRAM(s) IV Intermittent every 8 hours  dextrose 40% Gel 15 Gram(s) Oral once PRN  dextrose 5%. 1000 milliLiter(s) IV Continuous <Continuous>  dextrose 50% Injectable 12.5 Gram(s) IV Push once  dextrose 50% Injectable 25 Gram(s) IV Push once  dextrose 50% Injectable 25 Gram(s) IV Push once  diphenhydrAMINE 25 milliGRAM(s) Oral every 4 hours PRN  enoxaparin Injectable 40 milliGRAM(s) SubCutaneous every 12 hours  gabapentin 300 milliGRAM(s) Oral two times a day  glucagon  Injectable 1 milliGRAM(s) IntraMuscular once PRN  insulin glargine Injectable (LANTUS) 35 Unit(s) SubCutaneous at bedtime  insulin lispro (HumaLOG) corrective regimen sliding scale   SubCutaneous Before meals and at bedtime  insulin lispro Injectable (HumaLOG) 30 Unit(s) SubCutaneous three times a day before meals  lidocaine 5% Ointment 1 Application(s) Topical four times a day PRN  metroNIDAZOLE  IVPB 500 milliGRAM(s) IV Intermittent every 8 hours  nystatin Powder 1 Application(s) Topical three times a day  vancomycin  IVPB 2000 milliGRAM(s) IV Intermittent every 12 hours      Allergies:  amoxicillin (Unknown)  Bactrim I.V. (Rash (Mod to Severe))  clindamycin (Pruritus)  iodine containing compounds (Hives; Anaphylaxis)  penicillin (Hives)  shellfish. (Hives; Anaphylaxis)      ROS:  Denies the following except as indicated.    General: weight loss/weight gain, decreased appetite, fatigue  Eyes: Blurry vision, double vision, visual changes  ENT: Throat pain, changes in voice,   CV: palpitations, SOB, CP, cough  GI: NVD, difficulty swallowing, abdominal pain  : polyuria, dysuria  Endo: abnormal menses, temperature intolerance, decreased libido  MSK: weakness, joint pain  Skin: rash, dryness, diaphoresis  Heme: Easy bruising,bleeding  Neuro: HA, dizziness, lightheadedness, numbness tingling  Psych: Anxiety, Depression    Vital Signs Last 24 Hrs  T(C): 36.9 (2020 08:30), Max: 37.2 (2020 18:09)  T(F): 98.4 (2020 08:30), Max: 99 (2020 03:30)  HR: 99 (2020 08:30) (91 - 108)  BP: 155/88 (2020 08:30) (111/72 - 171/84)  BP(mean): --  RR: 16 (2020 08:30) (16 - 20)  SpO2: 98% (2020 08:30) (96% - 99%)  Height (cm): 170.18 ( @ 08:24)  Weight (kg): 133.7 ( @ 08:24)  BMI (kg/m2): 46.2 ( @ 08:24)      Constitutional: wn/wd in NAD.   HEENT: NCAT, MMM, OP clear, EOMI, , no proptosis or lid retraction  Neck: no thyromegaly or palpable thyroid nodules   Respiratory: lungs CTAB.  Cardiovascular: regular rhythm, normal S1 and S2, no audible murmurs, no peripheral edema  GI: soft, NT/ND, no masses/HSM appreciated.  Neurology: no tremors, DTR 2+  Skin: no visible rashes/lesions  Psychiatric: AAO x 3, normal affect/mood.  Ext: radial pulses intact, DP pulses intact, extremities warm, no cyanosis, clubbing or edema.       LABS:                        11.3   9.54  )-----------( 283      ( 2020 05:15 )             36.1         136  |  104  |  4<L>  ----------------------------<  371<H>  3.8   |  23  |  0.58    Ca    7.6<L>      2020 05:15  Phos  1.6         TPro  5.7<L>  /  Alb  2.9<L>  /  TBili  <0.2  /  DBili  x   /  AST  58<H>  /  ALT  35  /  AlkPhos  128<H>      PT/INR - ( 2020 09:29 )   PT: 13.1 sec;   INR: 1.14          PTT - ( 2020 09:29 )  PTT:29.0 sec  Urinalysis Basic - ( 2020 09:30 )    Color: Yellow / Appearance: SL Cloudy / SG: >=1.030 / pH: x  Gluc: x / Ketone: Trace mg/dL  / Bili: Negative / Urobili: 0.2 E.U./dL   Blood: x / Protein: 30 mg/dL / Nitrite: POSITIVE   Leuk Esterase: Trace / RBC: < 5 /HPF / WBC > 10 /HPF   Sq Epi: x / Non Sq Epi: Moderate /HPF / Bacteria: Present /HPF      Hemoglobin A1C, Whole Blood: 12.0 ( @ 07:11)    Thyroid Stimulating Hormone, Serum: 1.782 ( @ 09:29)      RADIOLOGY & ADDITIONAL STUDIES:  CAPILLARY BLOOD GLUCOSE      POCT Blood Glucose.: 350 mg/dL (2020 06:26)  POCT Blood Glucose.: 386 mg/dL (2020 21:56)  POCT Blood Glucose.: 388 mg/dL (2020 20:47)  POCT Blood Glucose.: 368 mg/dL (2020 14:34)        A/P: 40F  with last delivery in 2019 c/b post-operative wound infection of abdomen requiring IR drainage, with hx of multiple abdominal washouts for SSI requiring PICC line for abx & hernia repair w/ mesh numerous times (Dr. Hurtado) and other PMH of DVT, uncontrolled DM2, HTN, HLD, admitted to [facility] in March for vaginal yeast infection got admitted again with vulvovaginitis    1.  DM type 2 - uncontrolled - with complications and hyperglycemia  - hba1c 12.9  Cr/GFR 0.58/ 134  Wt 133.7 kg with BMI 46.2  carb consisitent diet  Please continue lantus       units at night / morning.  Please continue lispro      units before each meal.  Please continue lispro moderate / low dose sliding scale four times daily with meals and at bedtime    Pt's fingerstick glucose goal is     2. Morbid obesity  BMI 46.2  OP Obesisty management - will benefit from bariatric surgery    Will continue to monitor     For discharge, pt can continue    Pt can follow up at discharge with [facility] by calling  to make an appointment.   Will discuss case with     and update primary team HPI: HPI: 40F  with last delivery in 2019 c/b post-operative wound infection of abdomen requiring IR drainage, with hx of multiple abdominal washouts for SSI requiring PICC line for abx & hernia repair w/ mesh numerous times (Dr. Hurtado) and other PMH of DVT, uncontrolled DM2, HTN, HLD, admitted to [facility] in March for vaginal yeast infection discharged on Fluconazole, who now presents with 2 week of worsening vaginal and perianal pain, itching, copious thick white discharge and dysuria. She has tried over the counter antifungal creams as well as lidocaine spray with some relief. She reports her BP and blood glucose have not been well controlled which she attributes to her pain. Patient denies fever, chills, chest pain, SOB, abdominal pain, nausea, vomiting, vaginal bleeding. On presentation to the ED, vitals were T 98.3, , /95, RR 18, SpO2 96% on RA. Labs n/f WBC 13.95, lactate 5.4, Na 129, CO2 20, AG 19, glucose 518, negative BHB, VBG with normal pH.  UA positive for specific gravity >1.030, nitrites, trace leuk esterase, >10 WBC, +bacteria.  COVID negative.  CTAP w/ 3 cm area of infiltration of subcutaneous fat in the inferior left buttock consistent with superficial phlegmonous change or perhaps early abscess, probably related to the dermal appendages. Does not appear to be drainable at this time. No fistulous connection to the anal canal.  GYN evaluated patient who antifungals and pain control.  Patient was given Fluconazole 150mg PO, Vancomycin/Cefepime, developed itching and therefore given Benadryl and Atarax, Dilaudid 0.5mg IVP x3, Morphine 4mg IV, Labetalol 10mg IVP, insulin 10u and 4L NS. Surgery and Gyn has been following the patient. plan is for a better examination. She was also c/o of some left arm and leg swelling/ weakness - Duplex and CT head has been ordered    Endocrine was consulted for management for her DM.     FSG & Insulin received:  Yesterday:  1100 AM  - 10 units regular insulin s/c  1400 FSg 368  2000 FSg - 388, lantus 30 units at 6 PM  2200  - 35 lantus + 10 lispro SS  Today:  pre-breakfast fs, 30 nutritional lispro   units+ 18   units lispro SS  pre-lunch fsg:  nutritional lispro   units+   units lispro SS    DM history:  patient was last seen in 2020 for the same c/o of vaginal infection. unclear what regimen she was on before. She was discharged on lantus 40 units QHS and lispro 14 TID - her FSg were not under control during that time and did not have enough time to control her FSG. However, she had a tele-visit with her PCP OP when she was having increased FSG. her insulin dose was increase to basaglar 35 units BID and admelog 30 uints TID. But, she saw a tele visit during which her insulin regimen was increased. She is currently taking basaglar 42 to 46 units BID, and admelog 46 to 48 units TID    DM history  Age at Dx: Since   How dx: regular blood work  Hx and duration of insulin: for the past 1 years  Current Therapy: basaglar 42 to 46 units BID, and admelog 46 to 48 units TID  Hx of hypoglycemia: she had some low FSg when she ws pregnant 1 year ago. But for the past 1 years, her lowest FSg have been in 70s  Hx of DKA/HHS - denies    Home FSG:  checks her FSG     Hx of other regimens  Complications:  Outpatient Endo:    PMH & Surgical Hx:UTI  FH: hypertension  FH: diabetes mellitus  Pre-eclampsia  Abdominal hernia  Hernia  Obesity  Diabetes  Hyperlipidemia  Essential hypertension  Urinary tract infection  Perianal cellulitis  Cellulitis and abscess  Hyperlipidemia  Hyperlipidemia  Morbid obesity with BMI of 45.0-49.9, adult  Diabetes  Vaginal candidiasis  Severe sepsis  H/O exploratory laparotomy  H/O ventral hernia repair  H/O:   History of D&C      FH:  DM:  Thyroid:  Autoimmune:  Other:    SH:  Smoking  Etoh:  Recreational Drugs:  Social Life:    Current Meds:  acetaminophen   Tablet .. 650 milliGRAM(s) Oral every 6 hours PRN  atorvastatin 40 milliGRAM(s) Oral at bedtime  cefepime   IVPB 2000 milliGRAM(s) IV Intermittent every 8 hours  dextrose 40% Gel 15 Gram(s) Oral once PRN  dextrose 5%. 1000 milliLiter(s) IV Continuous <Continuous>  dextrose 50% Injectable 12.5 Gram(s) IV Push once  dextrose 50% Injectable 25 Gram(s) IV Push once  dextrose 50% Injectable 25 Gram(s) IV Push once  diphenhydrAMINE 25 milliGRAM(s) Oral every 4 hours PRN  enoxaparin Injectable 40 milliGRAM(s) SubCutaneous every 12 hours  gabapentin 300 milliGRAM(s) Oral two times a day  glucagon  Injectable 1 milliGRAM(s) IntraMuscular once PRN  insulin glargine Injectable (LANTUS) 35 Unit(s) SubCutaneous at bedtime  insulin lispro (HumaLOG) corrective regimen sliding scale   SubCutaneous Before meals and at bedtime  insulin lispro Injectable (HumaLOG) 30 Unit(s) SubCutaneous three times a day before meals  lidocaine 5% Ointment 1 Application(s) Topical four times a day PRN  metroNIDAZOLE  IVPB 500 milliGRAM(s) IV Intermittent every 8 hours  nystatin Powder 1 Application(s) Topical three times a day  vancomycin  IVPB 2000 milliGRAM(s) IV Intermittent every 12 hours      Allergies:  amoxicillin (Unknown)  Bactrim I.V. (Rash (Mod to Severe))  clindamycin (Pruritus)  iodine containing compounds (Hives; Anaphylaxis)  penicillin (Hives)  shellfish. (Hives; Anaphylaxis)      ROS:  Denies the following except as indicated.    General: weight loss/weight gain, decreased appetite, fatigue  Eyes: Blurry vision, double vision, visual changes  ENT: Throat pain, changes in voice,   CV: palpitations, SOB, CP, cough  GI: NVD, difficulty swallowing, abdominal pain  : polyuria, dysuria  Endo: abnormal menses, temperature intolerance, decreased libido  MSK: weakness, joint pain  Skin: rash, dryness, diaphoresis  Heme: Easy bruising,bleeding  Neuro: HA, dizziness, lightheadedness, numbness tingling  Psych: Anxiety, Depression    Vital Signs Last 24 Hrs  T(C): 36.9 (2020 08:30), Max: 37.2 (2020 18:09)  T(F): 98.4 (2020 08:30), Max: 99 (2020 03:30)  HR: 99 (2020 08:30) (91 - 108)  BP: 155/88 (2020 08:30) (111/72 - 171/84)  BP(mean): --  RR: 16 (2020 08:30) (16 - 20)  SpO2: 98% (2020 08:30) (96% - 99%)  Height (cm): 170.18 ( @ 08:24)  Weight (kg): 133.7 ( @ 08:24)  BMI (kg/m2): 46.2 ( @ 08:24)      Constitutional: wn/wd in NAD.   HEENT: NCAT, MMM, OP clear, EOMI, , no proptosis or lid retraction  Neck: no thyromegaly or palpable thyroid nodules   Respiratory: lungs CTAB.  Cardiovascular: regular rhythm, normal S1 and S2, no audible murmurs, no peripheral edema  GI: soft, NT/ND, no masses/HSM appreciated.  Neurology: no tremors, DTR 2+  Skin: no visible rashes/lesions  Psychiatric: AAO x 3, normal affect/mood.  Ext: radial pulses intact, DP pulses intact, extremities warm, no cyanosis, clubbing or edema.       LABS:                        11.3   9.54  )-----------( 283      ( 2020 05:15 )             36.1         136  |  104  |  4<L>  ----------------------------<  371<H>  3.8   |  23  |  0.58    Ca    7.6<L>      2020 05:15  Phos  1.6         TPro  5.7<L>  /  Alb  2.9<L>  /  TBili  <0.2  /  DBili  x   /  AST  58<H>  /  ALT  35  /  AlkPhos  128<H>      PT/INR - ( 2020 09:29 )   PT: 13.1 sec;   INR: 1.14          PTT - ( 2020 09:29 )  PTT:29.0 sec  Urinalysis Basic - ( 2020 09:30 )    Color: Yellow / Appearance: SL Cloudy / SG: >=1.030 / pH: x  Gluc: x / Ketone: Trace mg/dL  / Bili: Negative / Urobili: 0.2 E.U./dL   Blood: x / Protein: 30 mg/dL / Nitrite: POSITIVE   Leuk Esterase: Trace / RBC: < 5 /HPF / WBC > 10 /HPF   Sq Epi: x / Non Sq Epi: Moderate /HPF / Bacteria: Present /HPF      Hemoglobin A1C, Whole Blood: 12.0 ( @ 07:11)    Thyroid Stimulating Hormone, Serum: 1.782 ( @ 09:29)      RADIOLOGY & ADDITIONAL STUDIES:  CAPILLARY BLOOD GLUCOSE      POCT Blood Glucose.: 350 mg/dL (2020 06:26)  POCT Blood Glucose.: 386 mg/dL (2020 21:56)  POCT Blood Glucose.: 388 mg/dL (2020 20:47)  POCT Blood Glucose.: 368 mg/dL (2020 14:34)        A/P: 40F  with last delivery in 2019 c/b post-operative wound infection of abdomen requiring IR drainage, with hx of multiple abdominal washouts for SSI requiring PICC line for abx & hernia repair w/ mesh numerous times (Dr. Hurtado) and other PMH of DVT, uncontrolled DM2, HTN, HLD, admitted to [facility] in March for vaginal yeast infection got admitted again with vulvovaginitis    1.  DM type 2 - uncontrolled - with complications and hyperglycemia  - hba1c 12.9  Cr/GFR 0.58/ 134  Wt 133.7 kg with BMI 46.2  carb consisitent diet  Please continue lantus       units at night / morning.  Please continue lispro      units before each meal.  Please continue lispro moderate / low dose sliding scale four times daily with meals and at bedtime    Pt's fingerstick glucose goal is     2. Morbid obesity  BMI 46.2  OP Obesisty management - will benefit from bariatric surgery    Will continue to monitor     For discharge, pt can continue    Pt can follow up at discharge with [facility] by calling  to make an appointment.   Will discuss case with     and update primary team HPI: HPI: 40F  with last delivery in 2019 c/b post-operative wound infection of abdomen requiring IR drainage, with hx of multiple abdominal washouts for SSI requiring PICC line for abx & hernia repair w/ mesh numerous times (Dr. Hurtado) and other PMH of DVT, uncontrolled DM2, HTN, HLD, admitted to [facility] in March for vaginal yeast infection discharged on Fluconazole, who now presents with 2 week of worsening vaginal and perianal pain, itching, copious thick white discharge and dysuria. She has tried over the counter antifungal creams as well as lidocaine spray with some relief. She reports her BP and blood glucose have not been well controlled which she attributes to her pain. Patient denies fever, chills, chest pain, SOB, abdominal pain, nausea, vomiting, vaginal bleeding. On presentation to the ED, vitals were T 98.3, , /95, RR 18, SpO2 96% on RA. Labs n/f WBC 13.95, lactate 5.4, Na 129, CO2 20, AG 19, glucose 518, negative BHB, VBG with normal pH.  UA positive for specific gravity >1.030, nitrites, trace leuk esterase, >10 WBC, +bacteria.  COVID negative.  CTAP w/ 3 cm area of infiltration of subcutaneous fat in the inferior left buttock consistent with superficial phlegmonous change or perhaps early abscess, probably related to the dermal appendages. Does not appear to be drainable at this time. No fistulous connection to the anal canal.  GYN evaluated patient who antifungals and pain control.  Patient was given Fluconazole 150mg PO, Vancomycin/Cefepime, developed itching and therefore given Benadryl and Atarax, Dilaudid 0.5mg IVP x3, Morphine 4mg IV, Labetalol 10mg IVP, insulin 10u and 4L NS. Surgery and Gyn has been following the patient. plan is for a better examination. She was also c/o of some left arm and leg swelling/ weakness - Duplex and CT head has been ordered    Endocrine was consulted for management for her DM.     FSG & Insulin received:  Yesterday:  1100 AM  - 10 units regular insulin s/c  1400 FSg 368  2000 FSg - 388, lantus 30 units at 6 PM  2200  - 35 lantus + 10 lispro SS  Today:  pre-breakfast fs, 30 nutritional lispro   units+ 18   units lispro SS  pre-lunch fsg:  nutritional lispro   units+   units lispro SS    DM history:  patient was last seen in 2020 for the same c/o of vaginal infection. unclear what regimen she was on before. She was discharged on lantus 40 units QHS and lispro 14 TID - her FSg were not under control during that time and did not have enough time to control her FSG. However, she had a tele-visit with her PCP OP when she was having increased FSG. her insulin dose was increase to basaglar 35 units BID and admelog 30 uints TID. But, she saw a tele visit during which her insulin regimen was increased. She is currently taking basaglar 42 to 46 units BID, and admelog 46 to 48 units TID    DM history  Age at Dx: Since   How dx: regular blood work  Hx and duration of insulin: for the past 1 years  Current Therapy: basaglar 42 to 46 units BID, and admelog 46 to 48 units TID  Hx of hypoglycemia: she had some low FSg when she ws pregnant 1 year ago. But for the past 1 years, her lowest FSg have been in 70s  Hx of DKA/HHS - denies    Home FSG:  checks her FSG 4 to 6 times a day  QAM FSG will be 200s - now, given the infection FSg were in 300s  Later in the day, FSg will be in 200s to 300s.  She eats 1 to 2 meals a day  BF - will be eggs, toast, sebastián  Lunch/ dinner will be sandwich, salad with chicken    She does not eat that much carb. But the issue is that she feels thirsty and drinks lot of fluids. Most of the times, she said that will be water, but will be natural juices and regular sodas as well    Hx of other regimens: she was on metformin ( caused GI side effects but was stopped as it was ineffectvive) and on glyburide on and off both. Currently no oral meds  Complications: neuropathy, retinopathy  Outpatient Endo: Was suppsoed to f/u with us or . Currently being managed  by PCP    PMH & Surgical Hx:UTI  FH: hypertension  FH: diabetes mellitus  Pre-eclampsia  Abdominal hernia  Hernia  Obesity  Diabetes  Hyperlipidemia  Essential hypertension  Urinary tract infection  Perianal cellulitis  Cellulitis and abscess  Hyperlipidemia  Hyperlipidemia  Morbid obesity with BMI of 45.0-49.9, adult  Diabetes  Vaginal candidiasis  Severe sepsis  H/O exploratory laparotomy  H/O ventral hernia repair  H/O:   History of D&C      FH:  DM: in father  Thyroid: denies  Autoimmune: denies      SH:  Smoking: denies  Etoh:  denies  Recreational Drugs:  denies      Current Meds:  acetaminophen   Tablet .. 650 milliGRAM(s) Oral every 6 hours PRN  atorvastatin 40 milliGRAM(s) Oral at bedtime  cefepime   IVPB 2000 milliGRAM(s) IV Intermittent every 8 hours  dextrose 40% Gel 15 Gram(s) Oral once PRN  dextrose 5%. 1000 milliLiter(s) IV Continuous <Continuous>  dextrose 50% Injectable 12.5 Gram(s) IV Push once  dextrose 50% Injectable 25 Gram(s) IV Push once  dextrose 50% Injectable 25 Gram(s) IV Push once  diphenhydrAMINE 25 milliGRAM(s) Oral every 4 hours PRN  enoxaparin Injectable 40 milliGRAM(s) SubCutaneous every 12 hours  gabapentin 300 milliGRAM(s) Oral two times a day  glucagon  Injectable 1 milliGRAM(s) IntraMuscular once PRN  insulin glargine Injectable (LANTUS) 35 Unit(s) SubCutaneous at bedtime  insulin lispro (HumaLOG) corrective regimen sliding scale   SubCutaneous Before meals and at bedtime  insulin lispro Injectable (HumaLOG) 30 Unit(s) SubCutaneous three times a day before meals  lidocaine 5% Ointment 1 Application(s) Topical four times a day PRN  metroNIDAZOLE  IVPB 500 milliGRAM(s) IV Intermittent every 8 hours  nystatin Powder 1 Application(s) Topical three times a day  vancomycin  IVPB 2000 milliGRAM(s) IV Intermittent every 12 hours      Allergies:  amoxicillin (Unknown)  Bactrim I.V. (Rash (Mod to Severe))  clindamycin (Pruritus)  iodine containing compounds (Hives; Anaphylaxis)  penicillin (Hives)  shellfish. (Hives; Anaphylaxis)      ROS:  Denies the following except as indicated.    General:  decreased appetite, fatigue  Eyes: double vision, visual changes  ENT: Throat pain, changes in voice,   CV: palpitations, SOB, CP, cough  GI: NVD, difficulty swallowing, abdominal pain  : dysuria  Endo: temperature intolerance, decreased libido  MSK: weakness, joint pain  Heme: Easy bruising, bleeding  Neuro: HA, dizziness, lightheadedness  Psych: Anxiety, Depression    Vital Signs Last 24 Hrs  T(C): 36.9 (2020 08:30), Max: 37.2 (2020 18:09)  T(F): 98.4 (2020 08:30), Max: 99 (2020 03:30)  HR: 99 (2020 08:30) (91 - 108)  BP: 155/88 (2020 08:30) (111/72 - 171/84)  BP(mean): --  RR: 16 (2020 08:30) (16 - 20)  SpO2: 98% (2020 08:30) (96% - 99%)  Height (cm): 170.18 ( @ 08:24)  Weight (kg): 133.7 ( @ 08:24)  BMI (kg/m2): 46.2 ( @ 08:24)      Constitutional: wn/wd in NAD, obese  HEENT: No proptosis or lid retraction  Neck: no thyromegaly or palpable thyroid nodules   Respiratory: lungs CTAB.  Cardiovascular: regular rhythm, normal S1 and S2, no peripheral edema  GI: soft, NT/ND, no masses/HSM appreciated.  Neurology: no tremors, DTR 2+, moves extremities  Psychiatric: AAO x 3, normal affect/mood.  Ext: radial pulses intact, DP pulses intact, extremities warm  External genitalia not examined as the patient did not want to be examined      LABS:                        11.3   9.54  )-----------( 283      ( 2020 05:15 )             36.1         136  |  104  |  4<L>  ----------------------------<  371<H>  3.8   |  23  |  0.58    Ca    7.6<L>      2020 05:15  Phos  1.6         TPro  5.7<L>  /  Alb  2.9<L>  /  TBili  <0.2  /  DBili  x   /  AST  58<H>  /  ALT  35  /  AlkPhos  128<H>      PT/INR - ( 2020 09:29 )   PT: 13.1 sec;   INR: 1.14          PTT - ( 2020 09:29 )  PTT:29.0 sec  Urinalysis Basic - ( 2020 09:30 )    Color: Yellow / Appearance: SL Cloudy / SG: >=1.030 / pH: x  Gluc: x / Ketone: Trace mg/dL  / Bili: Negative / Urobili: 0.2 E.U./dL   Blood: x / Protein: 30 mg/dL / Nitrite: POSITIVE   Leuk Esterase: Trace / RBC: < 5 /HPF / WBC > 10 /HPF   Sq Epi: x / Non Sq Epi: Moderate /HPF / Bacteria: Present /HPF      Hemoglobin A1C, Whole Blood: 12.0 ( @ 07:11)    Thyroid Stimulating Hormone, Serum: 1.782 ( @ 09:29)      RADIOLOGY & ADDITIONAL STUDIES:  CAPILLARY BLOOD GLUCOSE      POCT Blood Glucose.: 350 mg/dL (2020 06:26)  POCT Blood Glucose.: 386 mg/dL (2020 21:56)  POCT Blood Glucose.: 388 mg/dL (2020 20:47)  POCT Blood Glucose.: 368 mg/dL (2020 14:34)        A/P: 40F  with last delivery in 2019 c/b post-operative wound infection of abdomen requiring IR drainage, with hx of multiple abdominal washouts for SSI requiring PICC line for abx & hernia repair w/ mesh numerous times (Dr. Hurtado) and other PMH of DVT, uncontrolled DM2, HTN, HLD, admitted to [facility] in March for vaginal yeast infection got admitted again with vulvovaginitis    1.  DM type 2 - uncontrolled - with complications and hyperglycemia  - hba1c 12.9  Cr/GFR 0.58/ 134  Wt 133.7 kg with BMI 46.2  carb consistent diet  Please increase to Lantus  60 units at night.   Please continue Lispro 30 units before each meal.   Please continue lispro moderate dose sliding scale four times daily with meals and at bedtime  Pt's fingerstick glucose goal is 120 to 150  CDE Lissa and nutrition to be consulted    2. Morbid obesity  BMI 46.2  OP Obesity management - will benefit from bariatric surgery    3. Vit D deficiency  - vit D level was 14.4  - PLease start on Vitamin D 56630 IU once daily for 3 doses    Will continue to monitor     For discharge, TBD    Pt can follow up at discharge with [facility] by calling  to make an appointment.   discussed case with  and updated primary team

## 2020-06-17 NOTE — CHART NOTE - NSCHARTNOTEFT_GEN_A_CORE
Infectious Diseases Anti-infective Approval Note    Medication: Cefepime   Dose:  2 grams   Route:  IV  Frequency:  q8hrs  Duration:  7 days     Dose may be adjusted as needed for alterations in renal function.    *THIS IS NOT AN INFECTIOUS DISEASES CONSULTATION*

## 2020-06-17 NOTE — PROGRESS NOTE ADULT - PROBLEM SELECTOR PLAN 3
see above #1 Physical exam with severe indurated erythema and tenderness of perineum and perianal region. No gibran pus appreciated. Patient w/ allergies to ampicillin and penicillin, and had reaction to Bactrim several weeks ago, and clindamycin overnight.  -Cefepime 2g IV q12hrs (w/ ID approval) for gram-negative, MSSA, and pseudomonal coverage  -Vancomycin 2g IV q12hrs   Plan:  -C/w vancomycin 2g (6/16- ), cefepime 2g q12 (6/16- )  -Pain control    #Perineal abscess  CT pelvis w/ 3cm area of infiltration of subcutaneous fat in the inferior left buttock consistent with superficial phlegmonous change or perhaps early abscess.   -Cefepime for gram-neg coverage (6/16- ), flagyl for anaerobic coverage (6/17- ) Physical exam with severe indurated erythema and tenderness of perineum and perianal region. No gibran pus appreciated. Patient w/ allergies to ampicillin and penicillin, and had reaction to Bactrim several weeks ago, and clindamycin overnight.  -Cefepime 2g IV q12hrs (w/ ID approval) for gram-negative, MSSA, and pseudomonal coverage  -Vancomycin 2g IV q12hrs   Plan:  -C/w vancomycin 2g (6/16- ), cefepime 2g q12 (6/16- )  -Pain control    #Perineal abscess  CT pelvis w/ 3cm area of infiltration of subcutaneous fat in the inferior left buttock consistent with superficial phlegmonous change or perhaps early abscess.   -General surgery consulted, no surgical intervention for draining indicated, will continue to monitor  -Cefepime for gram-neg coverage (6/16- ), flagyl for anaerobic coverage (6/17- )

## 2020-06-17 NOTE — PROGRESS NOTE ADULT - SUBJECTIVE AND OBJECTIVE BOX
Pt seen and examined at bedside, no acute event overnight, haemodynamically stable. Denies fever, chills, nausea, vomiting, diarrhoea. Reports leakage of fluid from her backside, unable to localise where.      Vital Signs Last 24 Hrs  T(C): 36.7 (17 Jun 2020 06:30), Max: 37.2 (16 Jun 2020 18:09)  T(F): 98 (17 Jun 2020 06:30), Max: 99 (17 Jun 2020 03:30)  HR: 97 (17 Jun 2020 06:30) (91 - 115)  BP: 124/66 (17 Jun 2020 06:30) (111/72 - 171/110)  BP(mean): --  RR: 18 (17 Jun 2020 06:30) (16 - 20)  SpO2: 99% (17 Jun 2020 06:30) (96% - 99%)    Physical Exam:  General: appears uncomfortable, NAD  HEENT: MMM  Pulmonary: nonlabored breathing, normal resp effort  Cardiovascular: RRR  Abdominal: morbidly obese, soft, nontender, nondistended  : Diffuse erythematous changes to perineum left > right,  left buttocks with an area of erythema and fluctuance with drainage of serosanguinous fluid from the intergluteal cleft however unable to localise due to lack of exposure, pt in significant pain, refusing thorough exam.   Extremities: WWP, no edema, no calf tenderness  Neuro: no focal deficits  Psych: pleasant, tearful      Lines/drains/tubes:    I&O's Summary    16 Jun 2020 07:01  -  17 Jun 2020 07:00  --------------------------------------------------------  IN: 1500 mL / OUT: 0 mL / NET: 1500 mL        LABS:                        11.3   9.54  )-----------( 283      ( 17 Jun 2020 05:15 )             36.1     06-17    136  |  104  |  4<L>  ----------------------------<  371<H>  3.8   |  23  |  0.58    Ca    7.6<L>      17 Jun 2020 05:15  Phos  1.6     06-17    TPro  5.7<L>  /  Alb  2.9<L>  /  TBili  <0.2  /  DBili  x   /  AST  58<H>  /  ALT  35  /  AlkPhos  128<H>  06-17    PT/INR - ( 16 Jun 2020 09:29 )   PT: 13.1 sec;   INR: 1.14          PTT - ( 16 Jun 2020 09:29 )  PTT:29.0 sec  Urinalysis Basic - ( 16 Jun 2020 09:30 )    Color: Yellow / Appearance: SL Cloudy / SG: >=1.030 / pH: x  Gluc: x / Ketone: Trace mg/dL  / Bili: Negative / Urobili: 0.2 E.U./dL   Blood: x / Protein: 30 mg/dL / Nitrite: POSITIVE   Leuk Esterase: Trace / RBC: < 5 /HPF / WBC > 10 /HPF   Sq Epi: x / Non Sq Epi: Moderate /HPF / Bacteria: Present /HPF      CAPILLARY BLOOD GLUCOSE      POCT Blood Glucose.: 350 mg/dL (17 Jun 2020 06:26)  POCT Blood Glucose.: 386 mg/dL (16 Jun 2020 21:56)  POCT Blood Glucose.: 388 mg/dL (16 Jun 2020 20:47)  POCT Blood Glucose.: 368 mg/dL (16 Jun 2020 14:34)  POCT Blood Glucose.: 424 mg/dL (16 Jun 2020 11:47)  POCT Blood Glucose.: 413 mg/dL (16 Jun 2020 08:28)    LIVER FUNCTIONS - ( 17 Jun 2020 05:15 )  Alb: 2.9 g/dL / Pro: 5.7 g/dL / ALK PHOS: 128 U/L / ALT: 35 U/L / AST: 58 U/L / GGT: x             RADIOLOGY & ADDITIONAL STUDIES:

## 2020-06-17 NOTE — PROGRESS NOTE ADULT - PROBLEM SELECTOR PLAN 5
Patient reports poorly controlled htn, worsened to 170s-180s over the last 3 weeks after she ran out of her labetalol  -c/w labetalol 300mg tid   -Continue to monitor bps and adjust regimen as needed  -Ensure pt has PCP follow up on discharge or has sufficient refills Patient with boils and sinus tracts in axilla and groin, suspect underlying HS  -clindamycin topical  -can be followed up as outpatient  -Pain control with prn percocet Patient with history of type 2 Diabetes, does not recall insulin regimen  -Endo saw pt in May 2019, recommended mISS but at that point patient's glucose appeared to be better controlled   -lantus 30 QHS, lispro 8 premeal  -endo has been consulted Pt endorsing intermittent headache, unilateral in the periorbital region, with associated unilateral vision changes. Denies headache currently. CTH negative for hemorrhage or infarct. Etiology of headache likely pseudotumor cerebri (given obesity and nature of headache) vs ocular migraine.  -Will continue to monitor and manage pain control.    #L-arm weakness  Pt with L UE weakness on exam, 3/5 in LUE compared to 5/5 in RUE. Pt wincing during exam from pain, and physical exam revealed L wrist swelling. Wrist XR ordered to eval for fracture, result negative.

## 2020-06-18 LAB
-  AMPICILLIN/SULBACTAM: SIGNIFICANT CHANGE UP
-  AMPICILLIN: SIGNIFICANT CHANGE UP
-  CEFAZOLIN: SIGNIFICANT CHANGE UP
-  CEFAZOLIN: SIGNIFICANT CHANGE UP
-  CEFTRIAXONE: SIGNIFICANT CHANGE UP
-  CIPROFLOXACIN: SIGNIFICANT CHANGE UP
-  CLINDAMYCIN: SIGNIFICANT CHANGE UP
-  ERYTHROMYCIN: SIGNIFICANT CHANGE UP
-  GENTAMICIN: SIGNIFICANT CHANGE UP
-  LINEZOLID: SIGNIFICANT CHANGE UP
-  NITROFURANTOIN: SIGNIFICANT CHANGE UP
-  OXACILLIN: SIGNIFICANT CHANGE UP
-  PIPERACILLIN/TAZOBACTAM: SIGNIFICANT CHANGE UP
-  RIFAMPIN: SIGNIFICANT CHANGE UP
-  TOBRAMYCIN: SIGNIFICANT CHANGE UP
-  TRIMETHOPRIM/SULFAMETHOXAZOLE: SIGNIFICANT CHANGE UP
-  TRIMETHOPRIM/SULFAMETHOXAZOLE: SIGNIFICANT CHANGE UP
-  VANCOMYCIN: SIGNIFICANT CHANGE UP
ALBUMIN SERPL ELPH-MCNC: 2.7 G/DL — LOW (ref 3.3–5)
ALP SERPL-CCNC: 134 U/L — HIGH (ref 40–120)
ALT FLD-CCNC: 32 U/L — SIGNIFICANT CHANGE UP (ref 10–45)
ANION GAP SERPL CALC-SCNC: 8 MMOL/L — SIGNIFICANT CHANGE UP (ref 5–17)
AST SERPL-CCNC: 45 U/L — HIGH (ref 10–40)
BASOPHILS # BLD AUTO: 0.02 K/UL — SIGNIFICANT CHANGE UP (ref 0–0.2)
BASOPHILS NFR BLD AUTO: 0.3 % — SIGNIFICANT CHANGE UP (ref 0–2)
BILIRUB SERPL-MCNC: 0.2 MG/DL — SIGNIFICANT CHANGE UP (ref 0.2–1.2)
BUN SERPL-MCNC: 5 MG/DL — LOW (ref 7–23)
C TRACH RRNA SPEC QL NAA+PROBE: SIGNIFICANT CHANGE UP
CALCIUM SERPL-MCNC: 8.3 MG/DL — LOW (ref 8.4–10.5)
CHLORIDE SERPL-SCNC: 103 MMOL/L — SIGNIFICANT CHANGE UP (ref 96–108)
CO2 SERPL-SCNC: 25 MMOL/L — SIGNIFICANT CHANGE UP (ref 22–31)
CREAT SERPL-MCNC: 0.62 MG/DL — SIGNIFICANT CHANGE UP (ref 0.5–1.3)
CULTURE RESULTS: SIGNIFICANT CHANGE UP
CULTURE RESULTS: SIGNIFICANT CHANGE UP
EOSINOPHIL # BLD AUTO: 0.2 K/UL — SIGNIFICANT CHANGE UP (ref 0–0.5)
EOSINOPHIL NFR BLD AUTO: 2.9 % — SIGNIFICANT CHANGE UP (ref 0–6)
GLUCOSE BLDC GLUCOMTR-MCNC: 106 MG/DL — HIGH (ref 70–99)
GLUCOSE BLDC GLUCOMTR-MCNC: 133 MG/DL — HIGH (ref 70–99)
GLUCOSE BLDC GLUCOMTR-MCNC: 140 MG/DL — HIGH (ref 70–99)
GLUCOSE BLDC GLUCOMTR-MCNC: 147 MG/DL — HIGH (ref 70–99)
GLUCOSE BLDC GLUCOMTR-MCNC: 150 MG/DL — HIGH (ref 70–99)
GLUCOSE BLDC GLUCOMTR-MCNC: 214 MG/DL — HIGH (ref 70–99)
GLUCOSE BLDC GLUCOMTR-MCNC: 301 MG/DL — HIGH (ref 70–99)
GLUCOSE SERPL-MCNC: 288 MG/DL — HIGH (ref 70–99)
GRAM STN FLD: SIGNIFICANT CHANGE UP
HCT VFR BLD CALC: 38.3 % — SIGNIFICANT CHANGE UP (ref 34.5–45)
HGB BLD-MCNC: 11.9 G/DL — SIGNIFICANT CHANGE UP (ref 11.5–15.5)
IMM GRANULOCYTES NFR BLD AUTO: 0.7 % — SIGNIFICANT CHANGE UP (ref 0–1.5)
LYMPHOCYTES # BLD AUTO: 2.2 K/UL — SIGNIFICANT CHANGE UP (ref 1–3.3)
LYMPHOCYTES # BLD AUTO: 31.7 % — SIGNIFICANT CHANGE UP (ref 13–44)
MAGNESIUM SERPL-MCNC: 1.6 MG/DL — SIGNIFICANT CHANGE UP (ref 1.6–2.6)
MCHC RBC-ENTMCNC: 26.6 PG — LOW (ref 27–34)
MCHC RBC-ENTMCNC: 31.1 GM/DL — LOW (ref 32–36)
MCV RBC AUTO: 85.5 FL — SIGNIFICANT CHANGE UP (ref 80–100)
METHOD TYPE: SIGNIFICANT CHANGE UP
METHOD TYPE: SIGNIFICANT CHANGE UP
MONOCYTES # BLD AUTO: 0.56 K/UL — SIGNIFICANT CHANGE UP (ref 0–0.9)
MONOCYTES NFR BLD AUTO: 8.1 % — SIGNIFICANT CHANGE UP (ref 2–14)
N GONORRHOEA RRNA SPEC QL NAA+PROBE: SIGNIFICANT CHANGE UP
NEUTROPHILS # BLD AUTO: 3.92 K/UL — SIGNIFICANT CHANGE UP (ref 1.8–7.4)
NEUTROPHILS NFR BLD AUTO: 56.3 % — SIGNIFICANT CHANGE UP (ref 43–77)
NRBC # BLD: 0 /100 WBCS — SIGNIFICANT CHANGE UP (ref 0–0)
ORGANISM # SPEC MICROSCOPIC CNT: SIGNIFICANT CHANGE UP
ORGANISM # SPEC MICROSCOPIC CNT: SIGNIFICANT CHANGE UP
PHOSPHATE SERPL-MCNC: 2.4 MG/DL — LOW (ref 2.5–4.5)
PLATELET # BLD AUTO: 270 K/UL — SIGNIFICANT CHANGE UP (ref 150–400)
POTASSIUM SERPL-MCNC: 3.6 MMOL/L — SIGNIFICANT CHANGE UP (ref 3.5–5.3)
POTASSIUM SERPL-SCNC: 3.6 MMOL/L — SIGNIFICANT CHANGE UP (ref 3.5–5.3)
PROT SERPL-MCNC: 5.9 G/DL — LOW (ref 6–8.3)
RBC # BLD: 4.48 M/UL — SIGNIFICANT CHANGE UP (ref 3.8–5.2)
RBC # FLD: 15.1 % — HIGH (ref 10.3–14.5)
SODIUM SERPL-SCNC: 136 MMOL/L — SIGNIFICANT CHANGE UP (ref 135–145)
SPECIMEN SOURCE: SIGNIFICANT CHANGE UP
WBC # BLD: 6.95 K/UL — SIGNIFICANT CHANGE UP (ref 3.8–10.5)
WBC # FLD AUTO: 6.95 K/UL — SIGNIFICANT CHANGE UP (ref 3.8–10.5)

## 2020-06-18 PROCEDURE — 99231 SBSQ HOSP IP/OBS SF/LOW 25: CPT | Mod: GC

## 2020-06-18 PROCEDURE — 46040 I&D ISCHIORCT&/PERIRCT ABSC: CPT

## 2020-06-18 PROCEDURE — 99233 SBSQ HOSP IP/OBS HIGH 50: CPT | Mod: GC

## 2020-06-18 RX ORDER — SODIUM CHLORIDE 9 MG/ML
1000 INJECTION, SOLUTION INTRAVENOUS
Refills: 0 | Status: DISCONTINUED | OUTPATIENT
Start: 2020-06-18 | End: 2020-06-20

## 2020-06-18 RX ORDER — GLUCAGON INJECTION, SOLUTION 0.5 MG/.1ML
1 INJECTION, SOLUTION SUBCUTANEOUS ONCE
Refills: 0 | Status: DISCONTINUED | OUTPATIENT
Start: 2020-06-18 | End: 2020-06-20

## 2020-06-18 RX ORDER — INSULIN LISPRO 100/ML
VIAL (ML) SUBCUTANEOUS
Refills: 0 | Status: DISCONTINUED | OUTPATIENT
Start: 2020-06-18 | End: 2020-06-20

## 2020-06-18 RX ORDER — ENOXAPARIN SODIUM 100 MG/ML
40 INJECTION SUBCUTANEOUS EVERY 12 HOURS
Refills: 0 | Status: DISCONTINUED | OUTPATIENT
Start: 2020-06-18 | End: 2020-06-20

## 2020-06-18 RX ORDER — OXYCODONE AND ACETAMINOPHEN 5; 325 MG/1; MG/1
1 TABLET ORAL EVERY 6 HOURS
Refills: 0 | Status: DISCONTINUED | OUTPATIENT
Start: 2020-06-18 | End: 2020-06-19

## 2020-06-18 RX ORDER — HYDROMORPHONE HYDROCHLORIDE 2 MG/ML
0.5 INJECTION INTRAMUSCULAR; INTRAVENOUS; SUBCUTANEOUS ONCE
Refills: 0 | Status: DISCONTINUED | OUTPATIENT
Start: 2020-06-18 | End: 2020-06-18

## 2020-06-18 RX ORDER — ATORVASTATIN CALCIUM 80 MG/1
40 TABLET, FILM COATED ORAL AT BEDTIME
Refills: 0 | Status: DISCONTINUED | OUTPATIENT
Start: 2020-06-18 | End: 2020-06-20

## 2020-06-18 RX ORDER — NYSTATIN CREAM 100000 [USP'U]/G
1 CREAM TOPICAL EVERY 8 HOURS
Refills: 0 | Status: DISCONTINUED | OUTPATIENT
Start: 2020-06-18 | End: 2020-06-20

## 2020-06-18 RX ORDER — CEFEPIME 1 G/1
2000 INJECTION, POWDER, FOR SOLUTION INTRAMUSCULAR; INTRAVENOUS EVERY 8 HOURS
Refills: 0 | Status: DISCONTINUED | OUTPATIENT
Start: 2020-06-18 | End: 2020-06-19

## 2020-06-18 RX ORDER — LOSARTAN POTASSIUM 100 MG/1
25 TABLET, FILM COATED ORAL DAILY
Refills: 0 | Status: DISCONTINUED | OUTPATIENT
Start: 2020-06-19 | End: 2020-06-20

## 2020-06-18 RX ORDER — POTASSIUM CHLORIDE 20 MEQ
40 PACKET (EA) ORAL ONCE
Refills: 0 | Status: COMPLETED | OUTPATIENT
Start: 2020-06-18 | End: 2020-06-18

## 2020-06-18 RX ORDER — INSULIN GLARGINE 100 [IU]/ML
60 INJECTION, SOLUTION SUBCUTANEOUS AT BEDTIME
Refills: 0 | Status: DISCONTINUED | OUTPATIENT
Start: 2020-06-18 | End: 2020-06-20

## 2020-06-18 RX ORDER — HYDROMORPHONE HYDROCHLORIDE 2 MG/ML
2 INJECTION INTRAMUSCULAR; INTRAVENOUS; SUBCUTANEOUS EVERY 4 HOURS
Refills: 0 | Status: DISCONTINUED | OUTPATIENT
Start: 2020-06-18 | End: 2020-06-20

## 2020-06-18 RX ORDER — SODIUM,POTASSIUM PHOSPHATES 278-250MG
1 POWDER IN PACKET (EA) ORAL ONCE
Refills: 0 | Status: COMPLETED | OUTPATIENT
Start: 2020-06-18 | End: 2020-06-18

## 2020-06-18 RX ORDER — DEXTROSE 50 % IN WATER 50 %
15 SYRINGE (ML) INTRAVENOUS ONCE
Refills: 0 | Status: DISCONTINUED | OUTPATIENT
Start: 2020-06-18 | End: 2020-06-20

## 2020-06-18 RX ORDER — HYDROMORPHONE HYDROCHLORIDE 2 MG/ML
2 INJECTION INTRAMUSCULAR; INTRAVENOUS; SUBCUTANEOUS EVERY 4 HOURS
Refills: 0 | Status: DISCONTINUED | OUTPATIENT
Start: 2020-06-18 | End: 2020-06-18

## 2020-06-18 RX ORDER — BUPIVACAINE 13.3 MG/ML
20 INJECTION, SUSPENSION, LIPOSOMAL INFILTRATION ONCE
Refills: 0 | Status: DISCONTINUED | OUTPATIENT
Start: 2020-06-18 | End: 2020-06-18

## 2020-06-18 RX ORDER — FLUCONAZOLE 150 MG/1
150 TABLET ORAL
Refills: 0 | Status: DISCONTINUED | OUTPATIENT
Start: 2020-06-19 | End: 2020-06-20

## 2020-06-18 RX ORDER — DEXTROSE 50 % IN WATER 50 %
12.5 SYRINGE (ML) INTRAVENOUS ONCE
Refills: 0 | Status: DISCONTINUED | OUTPATIENT
Start: 2020-06-18 | End: 2020-06-20

## 2020-06-18 RX ORDER — ERGOCALCIFEROL 1.25 MG/1
50000 CAPSULE ORAL DAILY
Refills: 0 | Status: COMPLETED | OUTPATIENT
Start: 2020-06-19 | End: 2020-06-20

## 2020-06-18 RX ORDER — BUPIVACAINE 13.3 MG/ML
20 INJECTION, SUSPENSION, LIPOSOMAL INFILTRATION ONCE
Refills: 0 | Status: DISCONTINUED | OUTPATIENT
Start: 2020-06-18 | End: 2020-06-20

## 2020-06-18 RX ORDER — HYDROMORPHONE HYDROCHLORIDE 2 MG/ML
1 INJECTION INTRAMUSCULAR; INTRAVENOUS; SUBCUTANEOUS ONCE
Refills: 0 | Status: DISCONTINUED | OUTPATIENT
Start: 2020-06-18 | End: 2020-06-18

## 2020-06-18 RX ORDER — DEXTROSE 50 % IN WATER 50 %
25 SYRINGE (ML) INTRAVENOUS ONCE
Refills: 0 | Status: DISCONTINUED | OUTPATIENT
Start: 2020-06-18 | End: 2020-06-20

## 2020-06-18 RX ORDER — DIPHENHYDRAMINE HCL 50 MG
25 CAPSULE ORAL EVERY 4 HOURS
Refills: 0 | Status: DISCONTINUED | OUTPATIENT
Start: 2020-06-18 | End: 2020-06-19

## 2020-06-18 RX ORDER — INSULIN LISPRO 100/ML
30 VIAL (ML) SUBCUTANEOUS
Refills: 0 | Status: DISCONTINUED | OUTPATIENT
Start: 2020-06-18 | End: 2020-06-19

## 2020-06-18 RX ORDER — LIDOCAINE 4 G/100G
1 CREAM TOPICAL
Refills: 0 | Status: DISCONTINUED | OUTPATIENT
Start: 2020-06-18 | End: 2020-06-20

## 2020-06-18 RX ORDER — VANCOMYCIN HCL 1 G
1000 VIAL (EA) INTRAVENOUS EVERY 8 HOURS
Refills: 0 | Status: DISCONTINUED | OUTPATIENT
Start: 2020-06-18 | End: 2020-06-18

## 2020-06-18 RX ADMIN — CEFEPIME 100 MILLIGRAM(S): 1 INJECTION, POWDER, FOR SOLUTION INTRAMUSCULAR; INTRAVENOUS at 22:28

## 2020-06-18 RX ADMIN — LOSARTAN POTASSIUM 25 MILLIGRAM(S): 100 TABLET, FILM COATED ORAL at 05:53

## 2020-06-18 RX ADMIN — ATORVASTATIN CALCIUM 40 MILLIGRAM(S): 80 TABLET, FILM COATED ORAL at 21:17

## 2020-06-18 RX ADMIN — ERGOCALCIFEROL 50000 UNIT(S): 1.25 CAPSULE ORAL at 11:38

## 2020-06-18 RX ADMIN — OXYCODONE AND ACETAMINOPHEN 1 TABLET(S): 5; 325 TABLET ORAL at 08:52

## 2020-06-18 RX ADMIN — HYDROMORPHONE HYDROCHLORIDE 1 MILLIGRAM(S): 2 INJECTION INTRAMUSCULAR; INTRAVENOUS; SUBCUTANEOUS at 11:37

## 2020-06-18 RX ADMIN — NYSTATIN CREAM 1 APPLICATION(S): 100000 CREAM TOPICAL at 05:53

## 2020-06-18 RX ADMIN — HYDROMORPHONE HYDROCHLORIDE 2 MILLIGRAM(S): 2 INJECTION INTRAMUSCULAR; INTRAVENOUS; SUBCUTANEOUS at 07:42

## 2020-06-18 RX ADMIN — Medication 40 MILLIEQUIVALENT(S): at 08:52

## 2020-06-18 RX ADMIN — OXYCODONE AND ACETAMINOPHEN 1 TABLET(S): 5; 325 TABLET ORAL at 20:06

## 2020-06-18 RX ADMIN — CEFEPIME 100 MILLIGRAM(S): 1 INJECTION, POWDER, FOR SOLUTION INTRAMUSCULAR; INTRAVENOUS at 05:53

## 2020-06-18 RX ADMIN — Medication 25 MILLIGRAM(S): at 05:56

## 2020-06-18 RX ADMIN — Medication 4: at 08:50

## 2020-06-18 RX ADMIN — HYDROMORPHONE HYDROCHLORIDE 2 MILLIGRAM(S): 2 INJECTION INTRAMUSCULAR; INTRAVENOUS; SUBCUTANEOUS at 21:16

## 2020-06-18 RX ADMIN — Medication 25 MILLIGRAM(S): at 12:16

## 2020-06-18 RX ADMIN — Medication 100 MILLIGRAM(S): at 05:52

## 2020-06-18 RX ADMIN — CEFEPIME 100 MILLIGRAM(S): 1 INJECTION, POWDER, FOR SOLUTION INTRAMUSCULAR; INTRAVENOUS at 13:32

## 2020-06-18 RX ADMIN — Medication 250 MILLIGRAM(S): at 05:54

## 2020-06-18 RX ADMIN — HYDROMORPHONE HYDROCHLORIDE 0.5 MILLIGRAM(S): 2 INJECTION INTRAMUSCULAR; INTRAVENOUS; SUBCUTANEOUS at 01:00

## 2020-06-18 RX ADMIN — Medication 25 MILLIGRAM(S): at 21:16

## 2020-06-18 RX ADMIN — NYSTATIN CREAM 1 APPLICATION(S): 100000 CREAM TOPICAL at 13:33

## 2020-06-18 RX ADMIN — Medication 8: at 22:34

## 2020-06-18 RX ADMIN — Medication 650 MILLIGRAM(S): at 08:48

## 2020-06-18 RX ADMIN — GABAPENTIN 300 MILLIGRAM(S): 400 CAPSULE ORAL at 05:53

## 2020-06-18 RX ADMIN — Medication 25 MILLIGRAM(S): at 01:00

## 2020-06-18 RX ADMIN — Medication 100 MILLIGRAM(S): at 13:32

## 2020-06-18 RX ADMIN — Medication 30 UNIT(S): at 08:50

## 2020-06-18 RX ADMIN — ENOXAPARIN SODIUM 40 MILLIGRAM(S): 100 INJECTION SUBCUTANEOUS at 09:02

## 2020-06-18 RX ADMIN — Medication 250 MILLIGRAM(S): at 00:59

## 2020-06-18 RX ADMIN — ENOXAPARIN SODIUM 40 MILLIGRAM(S): 100 INJECTION SUBCUTANEOUS at 21:16

## 2020-06-18 RX ADMIN — Medication 250 MILLIGRAM(S): at 19:14

## 2020-06-18 RX ADMIN — NYSTATIN CREAM 1 APPLICATION(S): 100000 CREAM TOPICAL at 21:17

## 2020-06-18 RX ADMIN — INSULIN GLARGINE 60 UNIT(S): 100 INJECTION, SOLUTION SUBCUTANEOUS at 22:34

## 2020-06-18 NOTE — PROGRESS NOTE ADULT - SUBJECTIVE AND OBJECTIVE BOX
Pt seen and examined at bedside, no acute event overnight, haemodynamically stable. Denies fever, chills, nausea, vomiting, diarrhoea. Reports continuous leakage of fluid from her backside, unable to localise where.      ICU Vital Signs Last 24 Hrs  T(C): 36.6 (18 Jun 2020 06:05), Max: 36.9 (17 Jun 2020 08:30)  T(F): 97.9 (18 Jun 2020 06:05), Max: 98.5 (17 Jun 2020 19:04)  HR: 88 (18 Jun 2020 06:05) (88 - 107)  BP: 143/83 (18 Jun 2020 06:05) (141/75 - 165/96)  BP(mean): --  ABP: --  ABP(mean): --  RR: 18 (18 Jun 2020 06:05) (16 - 18)  SpO2: 99% (18 Jun 2020 06:05) (98% - 99%)      Physical Exam:  General: appears uncomfortable, NAD  HEENT: MMM  Pulmonary: nonlabored breathing, normal resp effort  Cardiovascular: RRR  Abdominal: morbidly obese, soft, nontender, nondistended  : Diffuse erythematous changes to perineum left > right,  left buttocks with an area of erythema with drainage of serosanguinous/cloudy fluid from an area of small opening, unable to fully assess for tenderness. fluctuance or extent of involvement as pt is refusing thorough exam.   Extremities: WWP, no edema, no calf tenderness  Neuro: no focal deficits  Psych: pleasant, tearful      Lines/drains/tubes:          LABS:                        11.3   9.54  )-----------( 283      ( 17 Jun 2020 05:15 )             36.1     06-17    136  |  104  |  4<L>  ----------------------------<  371<H>  3.8   |  23  |  0.58    Ca    7.6<L>      17 Jun 2020 05:15  Phos  1.6     06-17    TPro  5.7<L>  /  Alb  2.9<L>  /  TBili  <0.2  /  DBili  x   /  AST  58<H>  /  ALT  35  /  AlkPhos  128<H>  06-17    PT/INR - ( 16 Jun 2020 09:29 )   PT: 13.1 sec;   INR: 1.14          PTT - ( 16 Jun 2020 09:29 )  PTT:29.0 sec  Urinalysis Basic - ( 16 Jun 2020 09:30 )    Labs from today pending.   Color: Yellow / Appearance: SL Cloudy / SG: >=1.030 / pH: x  Gluc: x / Ketone: Trace mg/dL  / Bili: Negative / Urobili: 0.2 E.U./dL   Blood: x / Protein: 30 mg/dL / Nitrite: POSITIVE   Leuk Esterase: Trace / RBC: < 5 /HPF / WBC > 10 /HPF   Sq Epi: x / Non Sq Epi: Moderate /HPF / Bacteria: Present /HPF      CAPILLARY BLOOD GLUCOSE      POCT Blood Glucose.: 350 mg/dL (17 Jun 2020 06:26)  POCT Blood Glucose.: 386 mg/dL (16 Jun 2020 21:56)  POCT Blood Glucose.: 388 mg/dL (16 Jun 2020 20:47)  POCT Blood Glucose.: 368 mg/dL (16 Jun 2020 14:34)  POCT Blood Glucose.: 424 mg/dL (16 Jun 2020 11:47)  POCT Blood Glucose.: 413 mg/dL (16 Jun 2020 08:28)    LIVER FUNCTIONS - ( 17 Jun 2020 05:15 )  Alb: 2.9 g/dL / Pro: 5.7 g/dL / ALK PHOS: 128 U/L / ALT: 35 U/L / AST: 58 U/L / GGT: x             RADIOLOGY & ADDITIONAL STUDIES:

## 2020-06-18 NOTE — PROGRESS NOTE ADULT - SUBJECTIVE AND OBJECTIVE BOX
GYN PROGRESS NOTE      Patient evaluated at the bedside. No acute events overnight. Patient continues to endorse perineal pain although slightly improved. Continues to report drainage of fluid and blood from her buttock.   Denies CP/SOB/dizziness/nausea/vomiting/abdominal pain/calf pain.    O:   T(C): 36.6 (06-18-20 @ 06:05), Max: 36.9 (06-17-20 @ 08:30)  HR: 88 (06-18-20 @ 06:05) (88 - 107)  BP: 143/83 (06-18-20 @ 06:05) (141/75 - 165/96)  RR: 18 (06-18-20 @ 06:05) (16 - 18)  SpO2: 99% (06-18-20 @ 06:05) (98% - 99%)  Wt(kg): --    GEN: patient appears upset  ABD: soft, nontender nondistended  Pelvic: significant eyrthema of skin from mons pubic extending into perianal region and inner thighs, diffusely tender to palpation, serosanguinous drainage in gluteal cleft, difficulty exposing due to pain.   EXT: no calf tenderness

## 2020-06-18 NOTE — PROGRESS NOTE ADULT - PROBLEM SELECTOR PLAN 3
Physical exam with severe indurated erythema and tenderness of perineum and perianal region. No gibran pus appreciated. Patient w/ allergies to ampicillin and penicillin, and had reaction to Bactrim several weeks ago, and clindamycin overnight.  -Cefepime 2g IV q12hrs (w/ ID approval) for gram-negative, MSSA, and pseudomonal coverage  -Vancomycin 1g IV q8hrs   Plan:  -C/w vancomycin 1g q8hrs (6/16- ), cefepime 2g q12 (6/16- )  -Pain control    #Perineal abscess  CT pelvis w/ 3cm area of infiltration of subcutaneous fat in the inferior left buttock consistent with superficial phlegmonous change or perhaps early abscess.   -General surgery consulted, plan for evaluation under sedation with possible drainage  -Cefepime for gram-neg coverage (6/16- ), flagyl for anaerobic coverage (6/17- )

## 2020-06-18 NOTE — PROGRESS NOTE ADULT - PROBLEM SELECTOR PLAN 6
Patient with history of type 2 Diabetes, does not recall insulin regimen. HbA1c 12.  -Endo saw pt in May 2019, recommended mISS but at that point patient's glucose appeared to be better controlled   -Endocrine consulted, will appreciate their recs regarding insulin management  -Current regimen: lantus 60U, lispro 30U TID, mISS

## 2020-06-18 NOTE — PROGRESS NOTE ADULT - PROBLEM SELECTOR PLAN 5
Pt endorsing intermittent headache, unilateral in the periorbital region, with associated unilateral vision changes. Denies headache currently. CTH negative for hemorrhage or infarct. Etiology of headache likely pseudotumor cerebri (given obesity and nature of headache) vs ocular migraine.  -Will continue to monitor and manage pain control.    #L-arm weakness  Pt with L UE weakness on exam, 3/5 in LUE compared to 5/5 in RUE. Pt wincing during exam from pain, and physical exam revealed L wrist swelling. Wrist XR ordered to eval for fracture, result negative.

## 2020-06-18 NOTE — PROGRESS NOTE ADULT - ASSESSMENT
40F with PMH of poorly controlled diabetes, HTN, ventral hernia c/b infected mesh (2018), C -section c/b haemoperitoneum (2019), admitted to medicine for vulvar candidiasis and cellulitis with severe hyperglycaemia, general surgery consulted to r/o buttocks abscess.     Plan    Continue IV antibiotics  Trend WCC  Serial buttocks and perineal exams  Rest of care as per primary team  Surgery team 4C will continue to follow  Discussed with attending and chief resident 40F with PMH of poorly controlled diabetes, HTN, ventral hernia c/b infected mesh (2018), C -section c/b haemoperitoneum (2019), admitted to medicine for vulvar candidiasis and cellulitis with severe hyperglycaemia, general surgery consulted to r/o buttocks abscess.     Plan  Possible OR for EUA, drainage today, plan pending discussion with primary team  NPO, IVF  Continue IV antibiotics  Trend WCC  Serial buttocks and perineal exams  Rest of care as per primary team  Surgery team 4C will continue to follow  Discussed with attending and chief resident

## 2020-06-18 NOTE — PROGRESS NOTE ADULT - SUBJECTIVE AND OBJECTIVE BOX
*** INCOMPLETE ***    OVERNIGHT EVENTS: As per overnight staff, there were no acute events overnight    INTERVAL EVENTS:    SUBJECTIVE HPI: Patient seen and examined at bedside. Patient resting comfortably, no complaints at this time. Patient denies: fever, chills, weakness, dizziness, headaches, changes in vision, chest pain, palpitations, shortness of breath, cough, N/V, diarrhea or constipation, dysuria, LE edema. ROS otherwise negative.      VITAL SIGNS:  Vital Signs Last 24 Hrs  T(C): 36.6 (18 Jun 2020 06:05), Max: 36.9 (17 Jun 2020 19:04)  T(F): 97.9 (18 Jun 2020 06:05), Max: 98.5 (17 Jun 2020 19:04)  HR: 88 (18 Jun 2020 06:05) (88 - 107)  BP: 143/83 (18 Jun 2020 06:05) (141/75 - 165/96)  BP(mean): --  RR: 18 (18 Jun 2020 06:05) (17 - 18)  SpO2: 99% (18 Jun 2020 06:05) (98% - 99%)        PHYSICAL EXAM:  General: Comfortable, pleasant/anxious/agitated, Ill-appearing, well-nourished/frail/cachectic, comfortable / in distress  Neurological: AAOx3, no focal deficits  HEENT: NC/AT; EOMI, PERRL, clear conjunctiva, no nasal or oropharyngeal discharge or exudates, MMM  Neck: supple, no cervical or post-auricular lymphadenopathy  Cardiovascular: +S1/S2, no murmurs/rubs/gallops, RRR  Respiratory: CTA B/L, no diminished breath sounds, no wheezes/rales/rhonchi, no increased work of breathing or accessory muscle use  Gastrointestinal: soft, NT/ND; active BSx4 quadrants  Genitourinary: no suprapubic tenderness, no CVA tenderness  Extremities: WWP; no edema, clubbing or cyanosis  Vascular: 2+ radial, DP/PT pulses B/L  Skin: no rashes  Lines/Drains:       MEDICATIONS:  MEDICATIONS  (STANDING):  atorvastatin 40 milliGRAM(s) Oral at bedtime  cefepime   IVPB 2000 milliGRAM(s) IV Intermittent every 8 hours  dextrose 5%. 1000 milliLiter(s) (50 mL/Hr) IV Continuous <Continuous>  dextrose 50% Injectable 12.5 Gram(s) IV Push once  dextrose 50% Injectable 25 Gram(s) IV Push once  dextrose 50% Injectable 25 Gram(s) IV Push once  enoxaparin Injectable 40 milliGRAM(s) SubCutaneous every 12 hours  ergocalciferol 36706 Unit(s) Oral daily  gabapentin 300 milliGRAM(s) Oral two times a day  insulin glargine Injectable (LANTUS) 60 Unit(s) SubCutaneous at bedtime  insulin lispro (HumaLOG) corrective regimen sliding scale   SubCutaneous Before meals and at bedtime  insulin lispro Injectable (HumaLOG) 30 Unit(s) SubCutaneous three times a day before meals  losartan 25 milliGRAM(s) Oral daily  metroNIDAZOLE  IVPB 500 milliGRAM(s) IV Intermittent every 8 hours  nystatin Powder 1 Application(s) Topical three times a day  vancomycin  IVPB 1000 milliGRAM(s) IV Intermittent every 8 hours    MEDICATIONS  (PRN):  acetaminophen   Tablet .. 650 milliGRAM(s) Oral every 6 hours PRN Temp greater or equal to 38C (100.4F), Mild Pain (1 - 3)  dextrose 40% Gel 15 Gram(s) Oral once PRN Blood Glucose LESS THAN 70 milliGRAM(s)/deciliter  diphenhydrAMINE 25 milliGRAM(s) Oral every 4 hours PRN Rash and/or Itching  glucagon  Injectable 1 milliGRAM(s) IntraMuscular once PRN Glucose LESS THAN 70 milligrams/deciliter  HYDROmorphone   Tablet 2 milliGRAM(s) Oral every 4 hours PRN Breakthrough  lidocaine 5% Ointment 1 Application(s) Topical four times a day PRN Pain  oxycodone    5 mG/acetaminophen 325 mG 1 Tablet(s) Oral every 6 hours PRN Severe Pain (7 - 10)      ALLERGIES/INTOLERANCES:  Allergies    amoxicillin (Unknown)  clindamycin (Pruritus)  iodine containing compounds (Hives; Anaphylaxis)  penicillin (Hives)  shellfish. (Hives; Anaphylaxis)    Intolerances    Bactrim I.V. (Rash (Mod to Severe))      LABS:                        11.9   6.95  )-----------( 270      ( 18 Jun 2020 07:15 )             38.3     06-18    136  |  103  |  5<L>  ----------------------------<  288<H>  3.6   |  25  |  0.62    Ca    8.3<L>      18 Jun 2020 07:15  Phos  2.4     06-18  Mg     1.6     06-18    TPro  5.9<L>  /  Alb  2.7<L>  /  TBili  0.2  /  DBili  x   /  AST  45<H>  /  ALT  32  /  AlkPhos  134<H>  06-18      CAPILLARY BLOOD GLUCOSE      POCT Blood Glucose.: 133 mg/dL (18 Jun 2020 12:48)                    Microbiology:    Culture - Other (collected 16 Jun 2020 12:30)  Source: .Other ulcerative wound Rt. perineum  Gram Stain (16 Jun 2020 17:07):    No organisms seen    Rare WBC's  Preliminary Report (18 Jun 2020 10:02):    Rare Staphylococcus aureus    Rare Candida albicans    Rare Streptococcus anginosus Susceptibility to follow.    Culture in progress  Organism: Staphylococcus aureus (18 Jun 2020 10:01)  Organism: Staphylococcus aureus (18 Jun 2020 10:01)    Culture - Genital (collected 16 Jun 2020 12:30)  Source: .Genital Cervical Swab  Final Report (18 Jun 2020 11:34):    Moderate Yeast    Accompanied by Normal genital chrystal    Culture - Urine (collected 16 Jun 2020 12:19)  Source: .Urine Clean Catch (Midstream)  Final Report (18 Jun 2020 08:30):    >100,000 CFU/ml Escherichia coli  Organism: Escherichia coli (18 Jun 2020 08:30)  Organism: Escherichia coli (18 Jun 2020 08:30)    Culture - Blood (collected 16 Jun 2020 12:16)  Source: .Blood Blood  Preliminary Report (17 Jun 2020 13:00):    No growth at 1 day.    Culture - Blood (collected 16 Jun 2020 12:16)  Source: .Blood Blood  Preliminary Report (17 Jun 2020 13:00):    No growth at 1 day.        RADIOLOGY, EKG AND ADDITIONAL TESTS: Reviewed. OVERNIGHT EVENTS: overnight vancomycin was adjusted from 2g q12hrs to 1g q8hrs due to subtherapeutic level.     INTERVAL EVENTS: general surgery planning evaluation of buttock abscess under anesthesia.    SUBJECTIVE HPI: Patient seen and examined at bedside.      VITAL SIGNS:  Vital Signs Last 24 Hrs  T(C): 36.6 (18 Jun 2020 06:05), Max: 36.9 (17 Jun 2020 19:04)  T(F): 97.9 (18 Jun 2020 06:05), Max: 98.5 (17 Jun 2020 19:04)  HR: 88 (18 Jun 2020 06:05) (88 - 107)  BP: 143/83 (18 Jun 2020 06:05) (141/75 - 165/96)  BP(mean): --  RR: 18 (18 Jun 2020 06:05) (17 - 18)  SpO2: 99% (18 Jun 2020 06:05) (98% - 99%)      PHYSICAL EXAM:  General: obese female, pleasant, uncomfortable in moderate distress due to pain  HEENT: NC/AT; clear conjunctiva, no nasal or oropharyngeal discharge or exudates, moist mucous membranes  Neck: supple, no cervical or post-auricular lymphadenopathy  Cardiovascular: +S1/S2, no murmurs/rubs/gallops, RRR  Respiratory: CTA B/L, no diminished breath sounds, no increased work of breathing or accessory muscle use  Gastrointestinal: soft, obese abd, NT/ND; active BSx4 quadrants, no hepatosplenomegaly palpated  Genitourinary: no suprapubic tenderness, no CVA tenderness  Extremities: WWP; calf tenderness L>R, robb sign negative  Vascular: 2+ radial, DP/PT pulses B/L  Skin: indurated erythemaous perineum and vulvar region w/ areas of denuded skin, blood-tinged purulent drainage in perianal region  Neurologic: AAOx3     -Motor: Normal bulk and tone. Upper Extremity strength: L 3/5, R 5/5 Strength bilateral upper extremity 5/5, bilateral lower extremities 5/5.      -Sensation UE intact      MEDICATIONS:  MEDICATIONS  (STANDING):  atorvastatin 40 milliGRAM(s) Oral at bedtime  cefepime   IVPB 2000 milliGRAM(s) IV Intermittent every 8 hours  dextrose 5%. 1000 milliLiter(s) (50 mL/Hr) IV Continuous <Continuous>  dextrose 50% Injectable 12.5 Gram(s) IV Push once  dextrose 50% Injectable 25 Gram(s) IV Push once  dextrose 50% Injectable 25 Gram(s) IV Push once  enoxaparin Injectable 40 milliGRAM(s) SubCutaneous every 12 hours  ergocalciferol 46348 Unit(s) Oral daily  gabapentin 300 milliGRAM(s) Oral two times a day  insulin glargine Injectable (LANTUS) 60 Unit(s) SubCutaneous at bedtime  insulin lispro (HumaLOG) corrective regimen sliding scale   SubCutaneous Before meals and at bedtime  insulin lispro Injectable (HumaLOG) 30 Unit(s) SubCutaneous three times a day before meals  losartan 25 milliGRAM(s) Oral daily  metroNIDAZOLE  IVPB 500 milliGRAM(s) IV Intermittent every 8 hours  nystatin Powder 1 Application(s) Topical three times a day  vancomycin  IVPB 1000 milliGRAM(s) IV Intermittent every 8 hours    MEDICATIONS  (PRN):  acetaminophen   Tablet .. 650 milliGRAM(s) Oral every 6 hours PRN Temp greater or equal to 38C (100.4F), Mild Pain (1 - 3)  dextrose 40% Gel 15 Gram(s) Oral once PRN Blood Glucose LESS THAN 70 milliGRAM(s)/deciliter  diphenhydrAMINE 25 milliGRAM(s) Oral every 4 hours PRN Rash and/or Itching  glucagon  Injectable 1 milliGRAM(s) IntraMuscular once PRN Glucose LESS THAN 70 milligrams/deciliter  HYDROmorphone   Tablet 2 milliGRAM(s) Oral every 4 hours PRN Breakthrough  lidocaine 5% Ointment 1 Application(s) Topical four times a day PRN Pain  oxycodone    5 mG/acetaminophen 325 mG 1 Tablet(s) Oral every 6 hours PRN Severe Pain (7 - 10)      ALLERGIES/INTOLERANCES:  Allergies    amoxicillin (Unknown)  clindamycin (Pruritus)  iodine containing compounds (Hives; Anaphylaxis)  penicillin (Hives)  shellfish. (Hives; Anaphylaxis)    Intolerances    Bactrim I.V. (Rash (Mod to Severe))      LABS:                        11.9   6.95  )-----------( 270      ( 18 Jun 2020 07:15 )             38.3     06-18    136  |  103  |  5<L>  ----------------------------<  288<H>  3.6   |  25  |  0.62    Ca    8.3<L>      18 Jun 2020 07:15  Phos  2.4     06-18  Mg     1.6     06-18    TPro  5.9<L>  /  Alb  2.7<L>  /  TBili  0.2  /  DBili  x   /  AST  45<H>  /  ALT  32  /  AlkPhos  134<H>  06-18      CAPILLARY BLOOD GLUCOSE - POCT Blood Glucose.: 133 mg/dL (18 Jun 2020 12:48)      Microbiology:  Culture - Other (collected 16 Jun 2020 12:30)  Source: .Other ulcerative wound Rt. perineum  Gram Stain (16 Jun 2020 17:07):    No organisms seen    Rare WBC's  Preliminary Report (18 Jun 2020 10:02):    Rare Staphylococcus aureus    Rare Candida albicans    Rare Streptococcus anginosus Susceptibility to follow.    Culture in progress  Organism: Staphylococcus aureus (18 Jun 2020 10:01)  Organism: Staphylococcus aureus (18 Jun 2020 10:01)    Culture - Genital (collected 16 Jun 2020 12:30)  Source: .Genital Cervical Swab  Final Report (18 Jun 2020 11:34):    Moderate Yeast    Accompanied by Normal genital chrystal    Culture - Urine (collected 16 Jun 2020 12:19)  Source: .Urine Clean Catch (Midstream)  Final Report (18 Jun 2020 08:30):  >100,000 CFU/ml Escherichia coli  Organism: Escherichia coli (18 Jun 2020 08:30)  Organism: Escherichia coli (18 Jun 2020 08:30)    Culture - Blood (collected 16 Jun 2020 12:16)  Source: .Blood Blood  Preliminary Report (17 Jun 2020 13:00):  No growth at 1 day.    Culture - Blood (collected 16 Jun 2020 12:16)  Source: .Blood Blood  Preliminary Report (17 Jun 2020 13:00):  No growth at 1 day.      RADIOLOGY, EKG AND ADDITIONAL TESTS: Reviewed.

## 2020-06-18 NOTE — BRIEF OPERATIVE NOTE - OPERATION/FINDINGS
Normal CHANCE and anoscopy, no evidence of tracking into the anal canal. Indurated area 5cm from the perianal verge on the left side, needle aspiration unyielding. Small area of Normal CHANCE and anoscopy, no evidence of tracking of abscess into the anal canal. Indurated area 5cm from the perianal verge on the left side, needle aspiration unyielding. Small opening enlarged, cavity explored, no pus, swabbed. Cavity irrigated with hydrogen peroxide, again no evidence of tracking into the anal canal. Haemostasis achieved, cavity left open and packed with iodoform packing.

## 2020-06-18 NOTE — PROGRESS NOTE ADULT - PROBLEM SELECTOR PLAN 4
Pt endorsing dysuria and urinary hesitancy, PE w/ suprapubic tenderness, UA w/ +WBCs, +nitrites, and moderate leuk esterase. Uclx w/ e.coli, susceptibility pending. Pt with known hx of e.coli UTI in the past, sensitive to cephalosporins. Started on cefepime w/ ID approval for e.coli and pseudomonas coverage (given uncontrolled diabetes)  -C/w cefepime 2g IV BID (6/16- )  -Pt with multiple abx allergies, pre-medicate with benadryl prior to administration  -f/u uclx sensitivities  -appreciate ID recs

## 2020-06-18 NOTE — PROGRESS NOTE ADULT - PROBLEM SELECTOR PLAN 1
RESOLVED  P/w 2/4 SIRS (WBC, HR), w/ lactate 5.4 Etiology of severe sepsis likely 2/2 vulvovaginal cellulitis, candidiasis, and UTI.   -S/p 4L NS, with improvement in lactate. No need to continue trending lactate as shown improvement. If hemodynamically unstable, will consider repeat. Leukocytosis resolved, HR now wnl.  -Management of infection per below

## 2020-06-18 NOTE — PROGRESS NOTE ADULT - ASSESSMENT
A/P  41yo  with PMH of uncontrolled DM2 and cHTN presenting to ED for 2 weeks of worsening vaginal and perianal pain, vaginal discharge and dysuria. In triage, patient noted to have elevated BPs 171/100 (patient reports at home they were as high as 240s) as well as fasting . Per conversation with ED attending patient stated on vancomycin and cefepime and to be admitted to medicine for optimization of medical comorbidities and treatment of complicated yeast infection and superimposed cellulitis. GYN to defer to primary team management,.    # Complicated Candidiasis  - Complicated candidiasis given patient is immunocompromised due to uncontrolled diabetes, severity of symptoms as well as history of recurrent candidiasis. Fluconazole 150mg PO q72hrs was started . ID following appreciate their recommendations.   Please keep area dry and add nystatin powder specifically on mons, groin and inguinal folds   - Pain management: Recommend  5% topical lidocaine to affected area,  P.O Dilaudid 2-4mg q6 hours and P.O Gabapentin for pain control (possible Lyrica if Gabapentin doesn't help).   - Genital cultures:  prelim moderate yeast / Ulcer culture: rare staph aureus, candidiasis, strep anginosis      - GYN will follow

## 2020-06-18 NOTE — PROGRESS NOTE ADULT - PROBLEM SELECTOR PLAN 8
Patient endorsing calf tenderness, L>R. Physical exam with reproducible tenderness, robb sign negative. Pt at risk for DVT given immobility and obesity. LE dopplers performed today, negative for DVT.  -Will start lovenox 40mg subcut for DVT ppx

## 2020-06-18 NOTE — PROGRESS NOTE ADULT - PROBLEM SELECTOR PLAN 7
Known hx of hypertension, on labetalol 300mg TID at home. Pt without PCP, recently referred to [facility] for further management. Pt likely uncontrolled, endorses home -170/100s.  -c/w losartan 25mg QD for blood pressure control    #HLD  Pt with hyperlipidemia, no prior hx of statin. Started on atorvastatin 40mg QD during admission  -C/w atorvastatin 40mg QD

## 2020-06-18 NOTE — PROGRESS NOTE ADULT - ASSESSMENT
Patient is a 41yo F, morbidly obese, with PMH of uncontrolled DM2, HTN, HLD, and recent admission for vulvovaginal candidiasis (treated with fluconazole, clotrimazole) and e.Coli UTI, who presented with 2wks of worsening vaginal pain, redness, and swelling, admitted for severe sepsis 2/2 vulvovaginal cellulitis and candidiasis w/ concomitant UTI.

## 2020-06-18 NOTE — PROGRESS NOTE ADULT - SUBJECTIVE AND OBJECTIVE BOX
INTERVAL HPI/OVERNIGHT EVENTS:    Patient is a 40y old  Female who presents with a chief complaint of vaginal candidiasis (18 Jun 2020 07:14)      Pt reports the following symptoms:    CONSTITUTIONAL:  Negative fever or chills, feels well, good appetite  EYES:  Negative  blurry vision or double vision  CARDIOVASCULAR:  Negative for chest pain or palpitations  RESPIRATORY:  Negative for cough, wheezing, or SOB   GASTROINTESTINAL:  Negative for nausea, vomiting, diarrhea, constipation, or abdominal pain  GENITOURINARY:  Negative frequency, urgency or dysuria  NEUROLOGIC:  No headache, confusion, dizziness, lightheadedness    MEDICATIONS  (STANDING):  atorvastatin 40 milliGRAM(s) Oral at bedtime  cefepime   IVPB 2000 milliGRAM(s) IV Intermittent every 8 hours  dextrose 5%. 1000 milliLiter(s) (50 mL/Hr) IV Continuous <Continuous>  dextrose 50% Injectable 12.5 Gram(s) IV Push once  dextrose 50% Injectable 25 Gram(s) IV Push once  dextrose 50% Injectable 25 Gram(s) IV Push once  enoxaparin Injectable 40 milliGRAM(s) SubCutaneous every 12 hours  ergocalciferol 71722 Unit(s) Oral daily  gabapentin 300 milliGRAM(s) Oral two times a day  insulin glargine Injectable (LANTUS) 60 Unit(s) SubCutaneous at bedtime  insulin lispro (HumaLOG) corrective regimen sliding scale   SubCutaneous Before meals and at bedtime  insulin lispro Injectable (HumaLOG) 30 Unit(s) SubCutaneous three times a day before meals  losartan 25 milliGRAM(s) Oral daily  metroNIDAZOLE  IVPB 500 milliGRAM(s) IV Intermittent every 8 hours  nystatin Powder 1 Application(s) Topical three times a day  vancomycin  IVPB 1000 milliGRAM(s) IV Intermittent every 8 hours    MEDICATIONS  (PRN):  acetaminophen   Tablet .. 650 milliGRAM(s) Oral every 6 hours PRN Temp greater or equal to 38C (100.4F), Mild Pain (1 - 3)  dextrose 40% Gel 15 Gram(s) Oral once PRN Blood Glucose LESS THAN 70 milliGRAM(s)/deciliter  diphenhydrAMINE 25 milliGRAM(s) Oral every 4 hours PRN Rash and/or Itching  glucagon  Injectable 1 milliGRAM(s) IntraMuscular once PRN Glucose LESS THAN 70 milligrams/deciliter  HYDROmorphone   Tablet 2 milliGRAM(s) Oral every 4 hours PRN Breakthrough  lidocaine 5% Ointment 1 Application(s) Topical four times a day PRN Pain  oxycodone    5 mG/acetaminophen 325 mG 1 Tablet(s) Oral every 6 hours PRN Severe Pain (7 - 10)      PHYSICAL EXAM  Vital Signs Last 24 Hrs  T(C): 36.6 (18 Jun 2020 06:05), Max: 36.9 (17 Jun 2020 19:04)  T(F): 97.9 (18 Jun 2020 06:05), Max: 98.5 (17 Jun 2020 19:04)  HR: 88 (18 Jun 2020 06:05) (88 - 107)  BP: 143/83 (18 Jun 2020 06:05) (141/75 - 165/96)  BP(mean): --  RR: 18 (18 Jun 2020 06:05) (17 - 18)  SpO2: 99% (18 Jun 2020 06:05) (98% - 99%)    Constitutional: wn/wd in NAD.   HEENT: NCAT, MMM, OP clear, EOMI, no proptosis or lid retraction  Neck: no thyromegaly or palpable thyroid nodules   Respiratory: lungs CTAB.  Cardiovascular: regular rhythm, normal S1 and S2, no audible murmurs, no peripheral edema  GI: soft, NT/ND, no masses/HSM appreciated.  Neurology: no tremors, DTR 2+  Skin: no visible rashes/lesions  Psychiatric: AAO x 3, normal affect/mood.    LABS:                        11.9   6.95  )-----------( 270      ( 18 Jun 2020 07:15 )             38.3     06-18    136  |  103  |  5<L>  ----------------------------<  288<H>  3.6   |  25  |  0.62    Ca    8.3<L>      18 Jun 2020 07:15  Phos  2.4     06-18  Mg     1.6     06-18    TPro  5.9<L>  /  Alb  2.7<L>  /  TBili  0.2  /  DBili  x   /  AST  45<H>  /  ALT  32  /  AlkPhos  134<H>  06-18        Thyroid Stimulating Hormone, Serum: 1.782 uIU/mL (06-16 @ 09:29)      HbA1C: 12.0 % (03-22 @ 07:11)    CAPILLARY BLOOD GLUCOSE      POCT Blood Glucose.: 150 mg/dL (18 Jun 2020 10:18)  POCT Blood Glucose.: 214 mg/dL (18 Jun 2020 08:22)  POCT Blood Glucose.: 237 mg/dL (17 Jun 2020 22:03)  POCT Blood Glucose.: 344 mg/dL (17 Jun 2020 18:30)  POCT Blood Glucose.: 352 mg/dL (17 Jun 2020 16:14)  POCT Blood Glucose.: 176 mg/dL (17 Jun 2020 13:46)      Insulin Sliding Scale requirements X 24 Hours:    RADIOLOGY & ADDITIONAL TESTS:    A/P: 40y Female with history of DM type II presenting for       1.  DM -     Please continue           units lantus at bedtime  / in the morning and        units lispro with meals and lispro moderate / low dose sliding scale 4 times daily with meals and at bedtime.  Please continue consistent carbohydrate diet.      Goal FSG is   Will continue to monitor   For discharge, pt can continue    Pt can follow up at discharge with [facility] by calling  to make an appointment.   Will discuss case with     and update primary team INTERVAL HPI/OVERNIGHT EVENTS:    Patient is a 40y old  Female who presents with a chief complaint of vaginal candidiasis (2020 07:14)  patient seen and examined at the bedside  she is being planned for Exam under anaesthesia for the vaginitis and abscess.  she was kept NPO from midnight  Patient was still c/o pain and was not feeling well.  FSG & Insulin received:  Yesterday:  pre-dinner fs, 30 nutritional lispro   units + 10  units lispro SS, dint like dinner - so did not eat  bedtime fs, 60 lantus   units +  4  units lispro SS  Today:  pre-breakfast fs, 30 nutritional lispro   units+  4  units lispro SS, received insulin but was NPO  pre-lunch fs  1200 Noon ,   1400     Pt reports the following symptoms:  CONSTITUTIONAL:  Negative fever or chills  CARDIOVASCULAR:  Negative for chest pain or palpitations  RESPIRATORY:  Negative for cough, wheezing, or SOB   GASTROINTESTINAL:  Negative for vomiting, diarrhea, constipation, or abdominal pain  NEUROLOGIC:  No headache, confusion, dizziness, lightheadedness    MEDICATIONS  (STANDING):  atorvastatin 40 milliGRAM(s) Oral at bedtime  cefepime   IVPB 2000 milliGRAM(s) IV Intermittent every 8 hours  dextrose 5%. 1000 milliLiter(s) (50 mL/Hr) IV Continuous <Continuous>  dextrose 50% Injectable 12.5 Gram(s) IV Push once  dextrose 50% Injectable 25 Gram(s) IV Push once  dextrose 50% Injectable 25 Gram(s) IV Push once  enoxaparin Injectable 40 milliGRAM(s) SubCutaneous every 12 hours  ergocalciferol 38277 Unit(s) Oral daily  gabapentin 300 milliGRAM(s) Oral two times a day  insulin glargine Injectable (LANTUS) 60 Unit(s) SubCutaneous at bedtime  insulin lispro (HumaLOG) corrective regimen sliding scale   SubCutaneous Before meals and at bedtime  insulin lispro Injectable (HumaLOG) 30 Unit(s) SubCutaneous three times a day before meals  losartan 25 milliGRAM(s) Oral daily  metroNIDAZOLE  IVPB 500 milliGRAM(s) IV Intermittent every 8 hours  nystatin Powder 1 Application(s) Topical three times a day  vancomycin  IVPB 1000 milliGRAM(s) IV Intermittent every 8 hours    MEDICATIONS  (PRN):  acetaminophen   Tablet .. 650 milliGRAM(s) Oral every 6 hours PRN Temp greater or equal to 38C (100.4F), Mild Pain (1 - 3)  dextrose 40% Gel 15 Gram(s) Oral once PRN Blood Glucose LESS THAN 70 milliGRAM(s)/deciliter  diphenhydrAMINE 25 milliGRAM(s) Oral every 4 hours PRN Rash and/or Itching  glucagon  Injectable 1 milliGRAM(s) IntraMuscular once PRN Glucose LESS THAN 70 milligrams/deciliter  HYDROmorphone   Tablet 2 milliGRAM(s) Oral every 4 hours PRN Breakthrough  lidocaine 5% Ointment 1 Application(s) Topical four times a day PRN Pain  oxycodone    5 mG/acetaminophen 325 mG 1 Tablet(s) Oral every 6 hours PRN Severe Pain (7 - 10)      PHYSICAL EXAM  Vital Signs Last 24 Hrs  T(C): 36.6 (2020 06:05), Max: 36.9 (2020 19:04)  T(F): 97.9 (2020 06:05), Max: 98.5 (2020 19:04)  HR: 88 (2020 06:05) (88 - 107)  BP: 143/83 (2020 06:05) (141/75 - 165/96)  BP(mean): --  RR: 18 (2020 06:05) (17 - 18)  SpO2: 99% (2020 06:05) (98% - 99%)    Constitutional: in mild distress  Respiratory: lungs CTAB.  Cardiovascular: regular rhythm, normal S1 and S2, no peripheral edema  GI: soft, NT/ND, no masses/HSM appreciated.  Neurology: no tremors, DTR 2+  LABS:                        11.9   6.95  )-----------( 270      ( 2020 07:15 )             38.3     06-18    136  |  103  |  5<L>  ----------------------------<  288<H>  3.6   |  25  |  0.62    Ca    8.3<L>      2020 07:15  Phos  2.4     06-18  Mg     1.6     06-18    TPro  5.9<L>  /  Alb  2.7<L>  /  TBili  0.2  /  DBili  x   /  AST  45<H>  /  ALT  32  /  AlkPhos  134<H>          Thyroid Stimulating Hormone, Serum: 1.782 uIU/mL ( @ 09:29)      HbA1C: 12.0 % ( @ 07:11)    CAPILLARY BLOOD GLUCOSE      POCT Blood Glucose.: 150 mg/dL (2020 10:18)  POCT Blood Glucose.: 214 mg/dL (2020 08:22)  POCT Blood Glucose.: 237 mg/dL (2020 22:03)  POCT Blood Glucose.: 344 mg/dL (2020 18:30)  POCT Blood Glucose.: 352 mg/dL (2020 16:14)  POCT Blood Glucose.: 176 mg/dL (2020 13:46)      Insulin Sliding Scale requirements X 24 Hours:    RADIOLOGY & ADDITIONAL TESTS:    A/P: 40F  with last delivery in 2019 c/b post-operative wound infection of abdomen requiring IR drainage, with hx of multiple abdominal washouts for SSI requiring PICC line for abx & hernia repair w/ mesh numerous times (Dr. Hurtado) and other PMH of DVT, uncontrolled DM2, HTN, HLD, admitted to [facility] in March for vaginal yeast infection got admitted again with vulvovaginitis    1.  DM type 2 - uncontrolled - with complications and hyperglycemia  - hba1c 12.9  Cr/GFR 0.58/ 134  Wt 133.7 kg with BMI 46.2  carb consistent diet  Please continue Lantus  60 units at night.   Please continue Lispro 30 units before each meal.   Please continue lispro moderate dose sliding scale four times daily with meals and at bedtime  Pt's fingerstick glucose goal is 120 to 150  CDE Lissa and nutrition to be consulted    We will check her morning FSg and dose her with lantus in the morning if needed. Her insulin requirements may decrease if they drained any abscess during the procedure    2. Morbid obesity  BMI 46.2  OP Obesity management - will benefit from bariatric surgery    3. Vit D deficiency  - vit D level was 14.4  - PLease continue Vitamin D 65764 IU once daily for 3 doses    Will continue to monitor     For discharge, TBD    Pt can follow up at discharge with [facility] by calling  to make an appointment.   discussed case with  and updated primary team

## 2020-06-19 ENCOUNTER — TRANSCRIPTION ENCOUNTER (OUTPATIENT)
Age: 40
End: 2020-06-19

## 2020-06-19 LAB
-  CEFTRIAXONE: SIGNIFICANT CHANGE UP
-  CLINDAMYCIN: SIGNIFICANT CHANGE UP
-  ERYTHROMYCIN: SIGNIFICANT CHANGE UP
-  LEVOFLOXACIN: SIGNIFICANT CHANGE UP
-  PENICILLIN: SIGNIFICANT CHANGE UP
-  VANCOMYCIN: SIGNIFICANT CHANGE UP
ANION GAP SERPL CALC-SCNC: 11 MMOL/L — SIGNIFICANT CHANGE UP (ref 5–17)
BASOPHILS # BLD AUTO: 0.01 K/UL — SIGNIFICANT CHANGE UP (ref 0–0.2)
BASOPHILS NFR BLD AUTO: 0.1 % — SIGNIFICANT CHANGE UP (ref 0–2)
BUN SERPL-MCNC: 4 MG/DL — LOW (ref 7–23)
CALCIUM SERPL-MCNC: 8.3 MG/DL — LOW (ref 8.4–10.5)
CHLORIDE SERPL-SCNC: 102 MMOL/L — SIGNIFICANT CHANGE UP (ref 96–108)
CO2 SERPL-SCNC: 25 MMOL/L — SIGNIFICANT CHANGE UP (ref 22–31)
CREAT SERPL-MCNC: 0.62 MG/DL — SIGNIFICANT CHANGE UP (ref 0.5–1.3)
CULTURE RESULTS: SIGNIFICANT CHANGE UP
EOSINOPHIL # BLD AUTO: 0.21 K/UL — SIGNIFICANT CHANGE UP (ref 0–0.5)
EOSINOPHIL NFR BLD AUTO: 2.9 % — SIGNIFICANT CHANGE UP (ref 0–6)
GLUCOSE BLDC GLUCOMTR-MCNC: 175 MG/DL — HIGH (ref 70–99)
GLUCOSE BLDC GLUCOMTR-MCNC: 199 MG/DL — HIGH (ref 70–99)
GLUCOSE BLDC GLUCOMTR-MCNC: 250 MG/DL — HIGH (ref 70–99)
GLUCOSE BLDC GLUCOMTR-MCNC: 296 MG/DL — HIGH (ref 70–99)
GLUCOSE SERPL-MCNC: 205 MG/DL — HIGH (ref 70–99)
HCT VFR BLD CALC: 39.1 % — SIGNIFICANT CHANGE UP (ref 34.5–45)
HGB BLD-MCNC: 12 G/DL — SIGNIFICANT CHANGE UP (ref 11.5–15.5)
IMM GRANULOCYTES NFR BLD AUTO: 0.6 % — SIGNIFICANT CHANGE UP (ref 0–1.5)
LYMPHOCYTES # BLD AUTO: 2.13 K/UL — SIGNIFICANT CHANGE UP (ref 1–3.3)
LYMPHOCYTES # BLD AUTO: 29.5 % — SIGNIFICANT CHANGE UP (ref 13–44)
MAGNESIUM SERPL-MCNC: 1.6 MG/DL — SIGNIFICANT CHANGE UP (ref 1.6–2.6)
MCHC RBC-ENTMCNC: 26.2 PG — LOW (ref 27–34)
MCHC RBC-ENTMCNC: 30.7 GM/DL — LOW (ref 32–36)
MCV RBC AUTO: 85.4 FL — SIGNIFICANT CHANGE UP (ref 80–100)
METHOD TYPE: SIGNIFICANT CHANGE UP
METHOD TYPE: SIGNIFICANT CHANGE UP
MONOCYTES # BLD AUTO: 0.48 K/UL — SIGNIFICANT CHANGE UP (ref 0–0.9)
MONOCYTES NFR BLD AUTO: 6.6 % — SIGNIFICANT CHANGE UP (ref 2–14)
NEUTROPHILS # BLD AUTO: 4.35 K/UL — SIGNIFICANT CHANGE UP (ref 1.8–7.4)
NEUTROPHILS NFR BLD AUTO: 60.3 % — SIGNIFICANT CHANGE UP (ref 43–77)
NRBC # BLD: 0 /100 WBCS — SIGNIFICANT CHANGE UP (ref 0–0)
ORGANISM # SPEC MICROSCOPIC CNT: SIGNIFICANT CHANGE UP
PHOSPHATE SERPL-MCNC: 2.9 MG/DL — SIGNIFICANT CHANGE UP (ref 2.5–4.5)
PLATELET # BLD AUTO: 302 K/UL — SIGNIFICANT CHANGE UP (ref 150–400)
POTASSIUM SERPL-MCNC: 4 MMOL/L — SIGNIFICANT CHANGE UP (ref 3.5–5.3)
POTASSIUM SERPL-SCNC: 4 MMOL/L — SIGNIFICANT CHANGE UP (ref 3.5–5.3)
RBC # BLD: 4.58 M/UL — SIGNIFICANT CHANGE UP (ref 3.8–5.2)
RBC # FLD: 15 % — HIGH (ref 10.3–14.5)
SODIUM SERPL-SCNC: 138 MMOL/L — SIGNIFICANT CHANGE UP (ref 135–145)
SPECIMEN SOURCE: SIGNIFICANT CHANGE UP
VANCOMYCIN TROUGH SERPL-MCNC: 13 UG/ML — SIGNIFICANT CHANGE UP (ref 10–20)
WBC # BLD: 7.22 K/UL — SIGNIFICANT CHANGE UP (ref 3.8–10.5)
WBC # FLD AUTO: 7.22 K/UL — SIGNIFICANT CHANGE UP (ref 3.8–10.5)

## 2020-06-19 PROCEDURE — 99232 SBSQ HOSP IP/OBS MODERATE 35: CPT | Mod: GC

## 2020-06-19 PROCEDURE — 99233 SBSQ HOSP IP/OBS HIGH 50: CPT | Mod: GC

## 2020-06-19 RX ORDER — HYDROMORPHONE HYDROCHLORIDE 2 MG/ML
1 INJECTION INTRAMUSCULAR; INTRAVENOUS; SUBCUTANEOUS
Qty: 30 | Refills: 0
Start: 2020-06-19 | End: 2020-06-23

## 2020-06-19 RX ORDER — VANCOMYCIN HCL 1 G
1250 VIAL (EA) INTRAVENOUS EVERY 8 HOURS
Refills: 0 | Status: COMPLETED | OUTPATIENT
Start: 2020-06-19 | End: 2020-06-19

## 2020-06-19 RX ORDER — HYDROXYZINE HCL 10 MG
25 TABLET ORAL EVERY 4 HOURS
Refills: 0 | Status: DISCONTINUED | OUTPATIENT
Start: 2020-06-19 | End: 2020-06-20

## 2020-06-19 RX ORDER — MAGNESIUM SULFATE 500 MG/ML
4 VIAL (ML) INJECTION ONCE
Refills: 0 | Status: DISCONTINUED | OUTPATIENT
Start: 2020-06-19 | End: 2020-06-20

## 2020-06-19 RX ORDER — METRONIDAZOLE 500 MG
500 TABLET ORAL EVERY 8 HOURS
Refills: 0 | Status: DISCONTINUED | OUTPATIENT
Start: 2020-06-19 | End: 2020-06-20

## 2020-06-19 RX ORDER — CALAMINE AND ZINC OXIDE AND PHENOL 160; 10 MG/ML; MG/ML
1 LOTION TOPICAL
Refills: 0 | Status: DISCONTINUED | OUTPATIENT
Start: 2020-06-19 | End: 2020-06-20

## 2020-06-19 RX ORDER — CEFTRIAXONE 500 MG/1
1000 INJECTION, POWDER, FOR SOLUTION INTRAMUSCULAR; INTRAVENOUS EVERY 24 HOURS
Refills: 0 | Status: DISCONTINUED | OUTPATIENT
Start: 2020-06-19 | End: 2020-06-20

## 2020-06-19 RX ORDER — DIPHENHYDRAMINE HCL 50 MG
50 CAPSULE ORAL ONCE
Refills: 0 | Status: COMPLETED | OUTPATIENT
Start: 2020-06-19 | End: 2020-06-19

## 2020-06-19 RX ORDER — VANCOMYCIN HCL 1 G
1250 VIAL (EA) INTRAVENOUS EVERY 8 HOURS
Refills: 0 | Status: DISCONTINUED | OUTPATIENT
Start: 2020-06-19 | End: 2020-06-19

## 2020-06-19 RX ORDER — INSULIN LISPRO 100/ML
25 VIAL (ML) SUBCUTANEOUS ONCE
Refills: 0 | Status: COMPLETED | OUTPATIENT
Start: 2020-06-19 | End: 2020-06-19

## 2020-06-19 RX ORDER — DIPHENHYDRAMINE HCL 50 MG
25 CAPSULE ORAL ONCE
Refills: 0 | Status: COMPLETED | OUTPATIENT
Start: 2020-06-19 | End: 2020-06-19

## 2020-06-19 RX ORDER — HYDROXYZINE HCL 10 MG
25 TABLET ORAL EVERY 4 HOURS
Refills: 0 | Status: DISCONTINUED | OUTPATIENT
Start: 2020-06-19 | End: 2020-06-19

## 2020-06-19 RX ORDER — INSULIN LISPRO 100/ML
25 VIAL (ML) SUBCUTANEOUS
Refills: 0 | Status: DISCONTINUED | OUTPATIENT
Start: 2020-06-19 | End: 2020-06-20

## 2020-06-19 RX ADMIN — Medication 25 UNIT(S): at 08:30

## 2020-06-19 RX ADMIN — NYSTATIN CREAM 1 APPLICATION(S): 100000 CREAM TOPICAL at 14:38

## 2020-06-19 RX ADMIN — LIDOCAINE 1 APPLICATION(S): 4 CREAM TOPICAL at 22:38

## 2020-06-19 RX ADMIN — Medication 25 MILLIGRAM(S): at 11:53

## 2020-06-19 RX ADMIN — HYDROMORPHONE HYDROCHLORIDE 2 MILLIGRAM(S): 2 INJECTION INTRAMUSCULAR; INTRAVENOUS; SUBCUTANEOUS at 18:26

## 2020-06-19 RX ADMIN — Medication 25 MILLIGRAM(S): at 21:00

## 2020-06-19 RX ADMIN — Medication 500 MILLIGRAM(S): at 16:09

## 2020-06-19 RX ADMIN — Medication 2: at 12:46

## 2020-06-19 RX ADMIN — ENOXAPARIN SODIUM 40 MILLIGRAM(S): 100 INJECTION SUBCUTANEOUS at 05:17

## 2020-06-19 RX ADMIN — Medication 166.67 MILLIGRAM(S): at 11:54

## 2020-06-19 RX ADMIN — HYDROMORPHONE HYDROCHLORIDE 2 MILLIGRAM(S): 2 INJECTION INTRAMUSCULAR; INTRAVENOUS; SUBCUTANEOUS at 03:48

## 2020-06-19 RX ADMIN — NYSTATIN CREAM 1 APPLICATION(S): 100000 CREAM TOPICAL at 22:37

## 2020-06-19 RX ADMIN — FLUCONAZOLE 150 MILLIGRAM(S): 150 TABLET ORAL at 16:09

## 2020-06-19 RX ADMIN — Medication 25 MILLIGRAM(S): at 02:15

## 2020-06-19 RX ADMIN — Medication 25 MILLIGRAM(S): at 05:17

## 2020-06-19 RX ADMIN — Medication 50 MILLIGRAM(S): at 06:30

## 2020-06-19 RX ADMIN — Medication 4: at 22:38

## 2020-06-19 RX ADMIN — CEFTRIAXONE 100 MILLIGRAM(S): 500 INJECTION, POWDER, FOR SOLUTION INTRAMUSCULAR; INTRAVENOUS at 16:14

## 2020-06-19 RX ADMIN — Medication 25 MILLIGRAM(S): at 16:15

## 2020-06-19 RX ADMIN — INSULIN GLARGINE 60 UNIT(S): 100 INJECTION, SOLUTION SUBCUTANEOUS at 22:37

## 2020-06-19 RX ADMIN — Medication 2: at 08:21

## 2020-06-19 RX ADMIN — Medication 166.67 MILLIGRAM(S): at 03:48

## 2020-06-19 RX ADMIN — ATORVASTATIN CALCIUM 40 MILLIGRAM(S): 80 TABLET, FILM COATED ORAL at 22:37

## 2020-06-19 RX ADMIN — Medication 6: at 18:26

## 2020-06-19 RX ADMIN — HYDROMORPHONE HYDROCHLORIDE 2 MILLIGRAM(S): 2 INJECTION INTRAMUSCULAR; INTRAVENOUS; SUBCUTANEOUS at 22:37

## 2020-06-19 RX ADMIN — OXYCODONE AND ACETAMINOPHEN 1 TABLET(S): 5; 325 TABLET ORAL at 08:12

## 2020-06-19 RX ADMIN — Medication 25 UNIT(S): at 18:26

## 2020-06-19 RX ADMIN — LOSARTAN POTASSIUM 25 MILLIGRAM(S): 100 TABLET, FILM COATED ORAL at 05:19

## 2020-06-19 RX ADMIN — NYSTATIN CREAM 1 APPLICATION(S): 100000 CREAM TOPICAL at 05:17

## 2020-06-19 RX ADMIN — CEFEPIME 100 MILLIGRAM(S): 1 INJECTION, POWDER, FOR SOLUTION INTRAMUSCULAR; INTRAVENOUS at 07:21

## 2020-06-19 RX ADMIN — ENOXAPARIN SODIUM 40 MILLIGRAM(S): 100 INJECTION SUBCUTANEOUS at 18:27

## 2020-06-19 RX ADMIN — Medication 25 UNIT(S): at 12:46

## 2020-06-19 RX ADMIN — ERGOCALCIFEROL 50000 UNIT(S): 1.25 CAPSULE ORAL at 12:43

## 2020-06-19 RX ADMIN — Medication 500 MILLIGRAM(S): at 22:37

## 2020-06-19 RX ADMIN — OXYCODONE AND ACETAMINOPHEN 1 TABLET(S): 5; 325 TABLET ORAL at 02:11

## 2020-06-19 NOTE — PROGRESS NOTE ADULT - PROBLEM SELECTOR PLAN 4
Pt endorsing dysuria and urinary hesitancy, PE w/ suprapubic tenderness, UA w/ +WBCs, +nitrites, and moderate leuk esterase. Uclx w/ e.coli, susceptibility pending. Pt with known hx of e.coli UTI in the past, sensitive to cephalosporins. Started on cefepime w/ ID approval for e.coli and pseudomonas coverage (given uncontrolled diabetes)  -C/w cefepime 2g IV BID (6/16- )  -Pt with multiple abx allergies, pre-medicate with benadryl prior to administration  -f/u uclx sensitivities  -appreciate ID recs resolved  Pt endorsing dysuria and urinary hesitancy, PE w/ suprapubic tenderness, UA w/ +WBCs, +nitrites, and moderate leuk esterase. Uclx w/ e.coli, susceptibility pending. Pt with known hx of e.coli UTI in the past, sensitive to cephalosporins. Started on cefepime w/ ID approval for e.coli and pseudomonas coverage (given uncontrolled diabetes)  -Pt with multiple abx allergies, pre-medicate with benadryl or atarax prior to administration  -appreciate ID recs

## 2020-06-19 NOTE — PROGRESS NOTE ADULT - PROBLEM SELECTOR PLAN 6
Patient with history of type 2 Diabetes, does not recall insulin regimen. HbA1c 12.  -Endo saw pt in May 2019, recommended mISS but at that point patient's glucose appeared to be better controlled   -Endocrine consulted, will appreciate their recs regarding insulin management  -Current regimen: lantus 60U, lispro 30U TID, mISS Patient with history of type 2 Diabetes, does not recall insulin regimen. HbA1c 12.  -Endo saw pt in May 2019, recommended mISS but at that point patient's glucose appeared to be better controlled   -Endocrine consulted, will appreciate their recs regarding insulin management  -Current regimen: lantus 60U, lispro 25U TID, mISS

## 2020-06-19 NOTE — DISCHARGE NOTE PROVIDER - NSDCHC_MEDRECSTATUS_GEN_ALL_CORE
Admission Reconciliation is Completed  Discharge Reconciliation is Not Complete Admission Reconciliation is Not Complete  Discharge Reconciliation is Not Complete Admission Reconciliation is Not Complete  Discharge Reconciliation is Completed Admission Reconciliation is Completed  Discharge Reconciliation is Completed

## 2020-06-19 NOTE — PROGRESS NOTE ADULT - ASSESSMENT
Patient is a 39yo F, morbidly obese, with PMH of uncontrolled DM2, HTN, HLD, and recent admission for vulvovaginal candidiasis (treated with fluconazole, clotrimazole) and e.Coli UTI, who presented with 2wks of worsening vaginal pain, redness, and swelling, admitted for severe sepsis 2/2 vulvovaginal cellulitis and candidiasis w/ concomitant UTI. Patient is a 41yo F, morbidly obese, with PMH of uncontrolled DM2, HTN, HLD, and recent admission for vulvovaginal candidiasis (treated with fluconazole, clotrimazole) and e.Coli UTI, who presented with 2wks of worsening vaginal pain, redness, and swelling, admitted for severe sepsis 2/2 vulvovaginal cellulitis and candidiasis w/ concomitant UTI.    -atarax  -ceftriaxone  -ID

## 2020-06-19 NOTE — DISCHARGE NOTE PROVIDER - CARE PROVIDER_API CALL
Jimenez Hollingsworth  SURGERY  [address] [place] [id number]  Phone: [phone]  Fax: [phone]  Scheduled Appointment: 07/06/2020 01:00 PM

## 2020-06-19 NOTE — PROGRESS NOTE ADULT - PROBLEM SELECTOR PLAN 2
Pt endorses symptoms resolved for 2-3wks after treatment before recurrence. Physical exam w/ erythema, several white plaques, and ruptured bullae. Hx s/f prior admission for vulvovaginal candiasis, treated with fluconazole and clotrimazole. OBGYN consulted, their assessment is that pt has complicated vaginitis due to immunocompromised status (uncontrolled DM), sx severity, and recurrent candidiasis.  -genital cervical swab growing yeast; perineal swab growing staph aureus, rare candida albicans, and rare strep anginosus  Plan:  -Per OBGYN: fluconazole 150mg PO q72hrs for 3 doses (6/16- ) followed by 150mg qweekly x6mo, nystatin powder, and pain management  -F/u cervical clx, ulcer clx, vaginitis panel, and urine G/C Hx s/f prior admission for vulvovaginal candiasis, treated with fluconazole and clotrimazole. ssx resolved for 2-3wks before recurrence. OBGYN consulted, their assessment is that pt has complicated vaginitis due to immunocompromised status (uncontrolled DM), sx severity, and recurrent candidiasis.  -genital cervical swab growing yeast; perineal swab growing staph aureus, rare candida albicans, and rare strep anginosus  -Initially on cefepime and vancomycin, transitioned to flagyl and ceftriaxone based on culture sensitivities  Plan:    -Per OBGYN: fluconazole 150mg PO q72hrs for 3 doses (6/16- ) followed by 150mg qweekly x6mo, nystatin powder, and pain management    -C/w flagyl 500mg q8hrs, ceftriaxone 1g qd

## 2020-06-19 NOTE — DISCHARGE NOTE PROVIDER - NSDCFUADDAPPT_GEN_ALL_CORE_FT
6/26/2020 at 11:20am You have an appointment with our endocrinology office on 6/26/2020 at 11:20am. You have an appointment with our endocrinology office on 6/26/2020 at 11:20am.    Wound care:   - apply nystatin powder   - keep region dry and clean   - can apply 5% topical lidocaine to affected area for pain

## 2020-06-19 NOTE — CHART NOTE - NSCHARTNOTEFT_GEN_A_CORE
Upon Nutritional Assessment by the Registered Dietitian your patient was determined to meet criteria / has evidence of the following diagnosis/diagnoses:          [ ]  Mild Protein Calorie Malnutrition        [ ]  Moderate Protein Calorie Malnutrition        [ ] Severe Protein Calorie Malnutrition        [ ] Unspecified Protein Calorie Malnutrition        [ ] Underweight / BMI <19        [x ] Morbid Obesity / BMI > 40(BMI:46.2)      Findings as based on:  •  Comprehensive nutrition assessment and consultation  •  Calorie counts (nutrient intake analysis)  •  Food acceptance and intake status from observations by staff  •  Follow up  •  Patient education  •  Intervention secondary to interdisciplinary rounds  •   concerns      Treatment:    The following diet has been recommended:      PROVIDER Section:     By signing this assessment you are acknowledging and agree with the diagnosis/diagnoses assigned by the Registered Dietitian    Comments:

## 2020-06-19 NOTE — DIETITIAN INITIAL EVALUATION ADULT. - PROBLEM SELECTOR PLAN 4
Patient presents with pain, burning with urination x2 weeks. Meeting severe sepsis criteria on admission. UA positive for specific gravity >1.030, nitrites, trace leuk esterase, >10 WBC, +bacteria.   - c/w cefepime 2g q12 (pre-treat w/ benadryl)  - f/u blood culture  - f/u urine culture

## 2020-06-19 NOTE — PROGRESS NOTE ADULT - SUBJECTIVE AND OBJECTIVE BOX
*** INCOMPLETE ***    OVERNIGHT EVENTS: As per overnight staff, there were no acute events overnight    INTERVAL EVENTS:    SUBJECTIVE HPI: Patient seen and examined at bedside. Patient resting comfortably, no complaints at this time. Patient denies: fever, chills, weakness, dizziness, headaches, changes in vision, chest pain, palpitations, shortness of breath, cough, N/V, diarrhea or constipation, dysuria, LE edema. ROS otherwise negative.      VITAL SIGNS:  Vital Signs Last 24 Hrs  T(C): 36.8 (19 Jun 2020 05:23), Max: 36.9 (18 Jun 2020 20:39)  T(F): 98.3 (19 Jun 2020 05:23), Max: 98.4 (18 Jun 2020 20:39)  HR: 95 (19 Jun 2020 05:23) (74 - 95)  BP: 161/97 (19 Jun 2020 05:23) (125/70 - 161/97)  BP(mean): 96 (18 Jun 2020 18:12) (91 - 112)  RR: 20 (19 Jun 2020 05:23) (7 - 25)  SpO2: 98% (19 Jun 2020 05:23) (97% - 100%)      06-18-20 @ 07:01  -  06-19-20 @ 07:00  --------------------------------------------------------  IN: 100 mL / OUT: 0 mL / NET: 100 mL        PHYSICAL EXAM:  General: Comfortable, pleasant/anxious/agitated, Ill-appearing, well-nourished/frail/cachectic, comfortable / in distress  Neurological: AAOx3, no focal deficits  HEENT: NC/AT; EOMI, PERRL, clear conjunctiva, no nasal or oropharyngeal discharge or exudates, MMM  Neck: supple, no cervical or post-auricular lymphadenopathy  Cardiovascular: +S1/S2, no murmurs/rubs/gallops, RRR  Respiratory: CTA B/L, no diminished breath sounds, no wheezes/rales/rhonchi, no increased work of breathing or accessory muscle use  Gastrointestinal: soft, NT/ND; active BSx4 quadrants  Genitourinary: no suprapubic tenderness, no CVA tenderness  Extremities: WWP; no edema, clubbing or cyanosis  Vascular: 2+ radial, DP/PT pulses B/L  Skin: no rashes  Lines/Drains:       MEDICATIONS:  MEDICATIONS  (STANDING):  atorvastatin 40 milliGRAM(s) Oral at bedtime  BUpivacaine liposome 1.3% Injectable (no eMAR) 20 milliLiter(s) Local Injection once  cefTRIAXone   IVPB 1000 milliGRAM(s) IV Intermittent every 24 hours  dextrose 5%. 1000 milliLiter(s) (50 mL/Hr) IV Continuous <Continuous>  dextrose 50% Injectable 12.5 Gram(s) IV Push once  dextrose 50% Injectable 25 Gram(s) IV Push once  dextrose 50% Injectable 25 Gram(s) IV Push once  enoxaparin Injectable 40 milliGRAM(s) SubCutaneous every 12 hours  ergocalciferol 03786 Unit(s) Oral daily  fluconAZOLE   Tablet 150 milliGRAM(s) Oral every 72 hours  insulin glargine Injectable (LANTUS) 60 Unit(s) SubCutaneous at bedtime  insulin lispro (HumaLOG) corrective regimen sliding scale   SubCutaneous Before meals and at bedtime  insulin lispro Injectable (HumaLOG) 25 Unit(s) SubCutaneous three times a day before meals  losartan 25 milliGRAM(s) Oral daily  magnesium sulfate  IVPB 4 Gram(s) IV Intermittent once  nystatin Powder 1 Application(s) Topical every 8 hours  vancomycin  IVPB 1250 milliGRAM(s) IV Intermittent every 8 hours    MEDICATIONS  (PRN):  calamine/zinc oxide Lotion 1 Application(s) Topical two times a day PRN Rash and/or Itching  dextrose 40% Gel 15 Gram(s) Oral once PRN Blood Glucose LESS THAN 70 milliGRAM(s)/deciliter  glucagon  Injectable 1 milliGRAM(s) IntraMuscular once PRN Glucose LESS THAN 70 milligrams/deciliter  HYDROmorphone   Tablet 2 milliGRAM(s) Oral every 4 hours PRN Breakthrough  hydrOXYzine hydrochloride 25 milliGRAM(s) Oral every 4 hours PRN Itching  lidocaine 5% Ointment 1 Application(s) Topical four times a day PRN Pain      ALLERGIES/INTOLERANCES:  Allergies    amoxicillin (Unknown)  clindamycin (Pruritus)  iodine containing compounds (Hives; Anaphylaxis)  penicillin (Hives)  shellfish. (Hives; Anaphylaxis)    Intolerances    Bactrim I.V. (Rash (Mod to Severe))      LABS:                        12.0   7.22  )-----------( 302      ( 19 Jun 2020 06:19 )             39.1     06-19    138  |  102  |  4<L>  ----------------------------<  205<H>  4.0   |  25  |  0.62    Ca    8.3<L>      19 Jun 2020 06:19  Phos  2.9     06-19  Mg     1.6     06-19    TPro  5.9<L>  /  Alb  2.7<L>  /  TBili  0.2  /  DBili  x   /  AST  45<H>  /  ALT  32  /  AlkPhos  134<H>  06-18      CAPILLARY BLOOD GLUCOSE  POCT Blood Glucose.: 175 mg/dL (19 Jun 2020 07:52)      MICROBIOLOGY:  Culture - Abscess with Gram Stain (collected 18 Jun 2020 17:03)  Source: .Abscess perianal abscess  Gram Stain (18 Jun 2020 17:52):    No organisms seen    No WBC's seen.  Preliminary Report (19 Jun 2020 08:58):    Few Staphylococcus aureus Susceptibility to follow.    Rare Gram Negative Rods Identification and susceptibility to follow.    Culture in progress    Culture - Other (collected 16 Jun 2020 12:30)  Source: .Other ulcerative wound Rt. perineum  Gram Stain (16 Jun 2020 17:07):    No organisms seen    Rare WBC's  Final Report (19 Jun 2020 11:06):    Rare Staphylococcus aureus    Rare Candida albicans    Rare Streptococcus anginosus    Rare Prevotella bivia  Organism: Staphylococcus aureus  Streptococcus anginosus  Streptococcus anginosus (19 Jun 2020 11:06)  Organism: Streptococcus anginosus (19 Jun 2020 11:06)  Organism: Streptococcus anginosus (19 Jun 2020 11:06)  Organism: Staphylococcus aureus (19 Jun 2020 11:06)    Culture - Genital (collected 16 Jun 2020 12:30)  Source: .Genital Cervical Swab  Final Report (18 Jun 2020 11:34):    Moderate Yeast    Accompanied by Normal genital chrystal    Culture - Urine (collected 16 Jun 2020 12:19)  Source: .Urine Clean Catch (Midstream)  Final Report (18 Jun 2020 08:30):    >100,000 CFU/ml Escherichia coli  Organism: Escherichia coli (18 Jun 2020 08:30)  Organism: Escherichia coli (18 Jun 2020 08:30)    Culture - Blood (collected 16 Jun 2020 12:16)  Source: .Blood Blood  Preliminary Report (18 Jun 2020 13:00):   No growth at 2 days.    Culture - Blood (collected 16 Jun 2020 12:16)  Source: .Blood Blood  Preliminary Report (18 Jun 2020 13:00):  No growth at 2 days.        RADIOLOGY, EKG AND ADDITIONAL TESTS: Reviewed. INTERVAL EVENTS: vancomycin discontinued based on culture sensitivities, started on flagyl for gram-negative coverage, and transitioned to ceftriaxone for gram-positive coverage.    SUBJECTIVE HPI: Patient seen and examined at bedside. Patient complaining of pain.      VITAL SIGNS:  Vital Signs Last 24 Hrs  T(C): 36.8 (19 Jun 2020 05:23), Max: 36.9 (18 Jun 2020 20:39)  T(F): 98.3 (19 Jun 2020 05:23), Max: 98.4 (18 Jun 2020 20:39)  HR: 95 (19 Jun 2020 05:23) (74 - 95)  BP: 161/97 (19 Jun 2020 05:23) (125/70 - 161/97)  BP(mean): 96 (18 Jun 2020 18:12) (91 - 112)  RR: 20 (19 Jun 2020 05:23) (7 - 25)  SpO2: 98% (19 Jun 2020 05:23) (97% - 100%)      06-18-20 @ 07:01  -  06-19-20 @ 07:00  --------------------------------------------------------  IN: 100 mL / OUT: 0 mL / NET: 100 mL      PHYSICAL EXAM:  General: obese female, pleasant, appears less distressed than yesterday  HEENT: NC/AT; clear conjunctiva, no nasal or oropharyngeal discharge or exudates, moist mucous membranes  Neck: supple, no cervical or post-auricular lymphadenopathy  Cardiovascular: +S1/S2, no murmurs/rubs/gallops, RRR  Respiratory: CTA B/L, no diminished breath sounds, no increased work of breathing or accessory muscle use  Gastrointestinal: soft, obese abd, NT/ND; active BSx4 quadrants, no hepatosplenomegaly palpated  Extremities: WWP; calf tenderness L>R, robb sign negative  Vascular: 2+ radial, DP/PT pulses B/L  Skin: indurated erythemaous perineum and vulvar region w/ areas of denuded skin, buttock abscess w/o purulent drainage      MEDICATIONS:  MEDICATIONS  (STANDING):  atorvastatin 40 milliGRAM(s) Oral at bedtime  BUpivacaine liposome 1.3% Injectable (no eMAR) 20 milliLiter(s) Local Injection once  cefTRIAXone   IVPB 1000 milliGRAM(s) IV Intermittent every 24 hours  dextrose 5%. 1000 milliLiter(s) (50 mL/Hr) IV Continuous <Continuous>  dextrose 50% Injectable 12.5 Gram(s) IV Push once  dextrose 50% Injectable 25 Gram(s) IV Push once  dextrose 50% Injectable 25 Gram(s) IV Push once  enoxaparin Injectable 40 milliGRAM(s) SubCutaneous every 12 hours  ergocalciferol 51782 Unit(s) Oral daily  fluconAZOLE   Tablet 150 milliGRAM(s) Oral every 72 hours  insulin glargine Injectable (LANTUS) 60 Unit(s) SubCutaneous at bedtime  insulin lispro (HumaLOG) corrective regimen sliding scale   SubCutaneous Before meals and at bedtime  insulin lispro Injectable (HumaLOG) 25 Unit(s) SubCutaneous three times a day before meals  losartan 25 milliGRAM(s) Oral daily  magnesium sulfate  IVPB 4 Gram(s) IV Intermittent once  nystatin Powder 1 Application(s) Topical every 8 hours  vancomycin  IVPB 1250 milliGRAM(s) IV Intermittent every 8 hours    MEDICATIONS  (PRN):  calamine/zinc oxide Lotion 1 Application(s) Topical two times a day PRN Rash and/or Itching  dextrose 40% Gel 15 Gram(s) Oral once PRN Blood Glucose LESS THAN 70 milliGRAM(s)/deciliter  glucagon  Injectable 1 milliGRAM(s) IntraMuscular once PRN Glucose LESS THAN 70 milligrams/deciliter  HYDROmorphone   Tablet 2 milliGRAM(s) Oral every 4 hours PRN Breakthrough  hydrOXYzine hydrochloride 25 milliGRAM(s) Oral every 4 hours PRN Itching  lidocaine 5% Ointment 1 Application(s) Topical four times a day PRN Pain      ALLERGIES/INTOLERANCES:  Allergies    amoxicillin (Unknown)  clindamycin (Pruritus)  iodine containing compounds (Hives; Anaphylaxis)  penicillin (Hives)  shellfish. (Hives; Anaphylaxis)    Intolerances    Bactrim I.V. (Rash (Mod to Severe))      LABS:                        12.0   7.22  )-----------( 302      ( 19 Jun 2020 06:19 )             39.1     06-19    138  |  102  |  4<L>  ----------------------------<  205<H>  4.0   |  25  |  0.62    Ca    8.3<L>      19 Jun 2020 06:19  Phos  2.9     06-19  Mg     1.6     06-19    TPro  5.9<L>  /  Alb  2.7<L>  /  TBili  0.2  /  DBili  x   /  AST  45<H>  /  ALT  32  /  AlkPhos  134<H>  06-18      CAPILLARY BLOOD GLUCOSE  POCT Blood Glucose.: 175 mg/dL (19 Jun 2020 07:52)      MICROBIOLOGY:  Culture - Abscess with Gram Stain (06.18.20 @ 17:03)    Gram Stain:   No organisms seen  No WBC's seen.    Specimen Source: .Abscess perianal abscess    Culture Results:   Few Staphylococcus aureus Susceptibility to follow.  Rare Gram Negative Rods Identification and susceptibility to follow.  Culture in progress      Culture - Other (collected 16 Jun 2020 12:30)  Source: .Other ulcerative wound Rt. perineum  Gram Stain (16 Jun 2020 17:07):    No organisms seen    Rare WBC's  Final Report (19 Jun 2020 11:06):    Rare Staphylococcus aureus    Rare Candida albicans    Rare Streptococcus anginosus    Rare Prevotella bivia  Organism: Staphylococcus aureus  Streptococcus anginosus  Streptococcus anginosus (19 Jun 2020 11:06)  Organism: Streptococcus anginosus (19 Jun 2020 11:06)  Organism: Streptococcus anginosus (19 Jun 2020 11:06)  Organism: Staphylococcus aureus (19 Jun 2020 11:06)    Culture - Genital (collected 16 Jun 2020 12:30)  Source: .Genital Cervical Swab  Final Report (18 Jun 2020 11:34):    Moderate Yeast    Accompanied by Normal genital chrystal    Culture - Urine (collected 16 Jun 2020 12:19)  Source: .Urine Clean Catch (Midstream)  Final Report (18 Jun 2020 08:30):    >100,000 CFU/ml Escherichia coli  Organism: Escherichia coli (18 Jun 2020 08:30)  Organism: Escherichia coli (18 Jun 2020 08:30)    Culture - Blood (collected 16 Jun 2020 12:16)  Source: .Blood Blood  Preliminary Report (18 Jun 2020 13:00):   No growth at 2 days.    Culture - Blood (collected 16 Jun 2020 12:16)  Source: .Blood Blood  Preliminary Report (18 Jun 2020 13:00):  No growth at 2 days.      RADIOLOGY, EKG AND ADDITIONAL TESTS: Reviewed.

## 2020-06-19 NOTE — DISCHARGE NOTE PROVIDER - NSDCFUSCHEDAPPT_GEN_ALL_CORE_FT
HASMUKH AGUILERA ; 06/26/2020 ; NPP Endocrin [address] [facility] ; 06/26/2020 ; NPP Endocrin [address] [facility] ; 07/06/2020 ; NPP Surg Gen [address] [facility] ; 06/26/2020 ; NPP Endocrin [address] [facility] ; 07/06/2020 ; NPP Surg Gen [address] [facility] ; 06/26/2020 ; NPP Endocrin [address] [facility] ; 07/06/2020 ; NPP Surg Gen [address]

## 2020-06-19 NOTE — DIETITIAN INITIAL EVALUATION ADULT. - PROBLEM SELECTOR PLAN 3
ADDENDUM: Significant perianal and gluteal tenderness, erythema, induration present. ?L gluteal phlegmon vs abscess. No drainable area visible on CT. Surgery unable to assess for drainable pocket by exam due to significant tenderness of the region. Patient w/ GNR/pseudomonal RFs  -c/w vanc, cefepime 2g q12h  -f/u bcx  -surgery following appreciate recs

## 2020-06-19 NOTE — DIETITIAN INITIAL EVALUATION ADULT. - PROBLEM SELECTOR PLAN 2
Patient with vaginal candidiasis likely 2/2 poorly controlled diabetes. GYN consulted on admission.  - c/w fluconazole 150mg q72hr x 3doses (followed by long term therapy for 6 months)   - c/w pain management: add 5% topical lidocaine to affected area  - f/u cultures of cervix, ulcer, urine, blood as well as vaginitis panel and urine G/C   - Dilaudid 2mg PO q6 hours and gabapentin 300 mg PO BID for pain control

## 2020-06-19 NOTE — DIETITIAN INITIAL EVALUATION ADULT. - PROBLEM SELECTOR PLAN 8
Patient with known history of hyperlipidemia. Not on outpatient statin.   - 10-year ASCVD risk is 7.6% (intermediate risk)  - Start lipitor 40mg daily when elevated LFTs resolve    #Transaminitis  Alk phos 168, AST 46. Elevated LFTs likely 2/2 sepsis  - Continue to trend

## 2020-06-19 NOTE — DISCHARGE NOTE PROVIDER - NSDCMRMEDTOKEN_GEN_ALL_CORE_FT
Basaglar KwikPen 100 units/mL subcutaneous solution: 35 unit(s) subcutaneous once a day  HumaLOG KwikPen 100 units/mL injectable solution: 30 unit(s) injectable 3 times a day (with meals)  Insulin Pen Needles, 4mm: 1 application subcutaneously 4 times a day. ** Use with insulin pen **   labetalol 300 mg oral tablet: 2 tab(s) orally 2 times a day Basaglar KwikPen 100 units/mL subcutaneous solution: 35 unit(s) subcutaneous once a day  Dilaudid 2 mg oral tablet: 1 tab(s) orally every 4 hours MDD:6 tabs  HumaLOG KwikPen 100 units/mL injectable solution: 30 unit(s) injectable 3 times a day (with meals)  Insulin Pen Needles, 4mm: 1 application subcutaneously 4 times a day. ** Use with insulin pen **   labetalol 300 mg oral tablet: 2 tab(s) orally 2 times a day alcohol swabs : Apply topically to affected area 4 times a day   atorvastatin 40 mg oral tablet: 1 tab(s) orally once a day (at bedtime)  Basaglar KwikPen 100 units/mL subcutaneous solution: 60 unit(s) subcutaneous once a day (at bedtime)   cefpodoxime 200 mg oral tablet: 2 tab(s) orally 2 times a day through 6/30/2020  Diflucan 150 mg oral tablet: 1 tab(s) orally once a week starting on June 22, 2020  through December 2020  Dilaudid 2 mg oral tablet: 1 tab(s) orally every 4 hours MDD:6 tabs  glucometer (per patient&#x27;s insurance): Test blood sugars four times a day. Dispense #1 glucometer.  HumaLOG KwikPen 100 units/mL injectable solution: 25 unit(s) injectable 3 times a day (before meals)   Insulin Pen Needles, 4mm: 1 application subcutaneously 4 times a day. ** Use with insulin pen **   losartan 25 mg oral tablet: 1 tab(s) orally once a day  metroNIDAZOLE 500 mg oral tablet: 1 tab(s) orally every 8 hours through 7/3/2020  nystatin 100,000 units/g topical powder: 1 application topically every 8 hours  test strips (per patient&#x27;s insurance): 1 application subcutaneously 4 times a day. ** Compatible with patient&#x27;s glucometer ** Admelog SoloStar 100 units/mL injectable solution: 25 unit(s) injectable 3 times a day (before meals)   alcohol swabs : Apply topically to affected area 4 times a day   atorvastatin 40 mg oral tablet: 1 tab(s) orally once a day (at bedtime)  Basaglar KwikPen 100 units/mL subcutaneous solution: 60 unit(s) subcutaneous once a day (at bedtime)   cefdinir 300 mg oral capsule: 1 cap(s) orally 2 times a day through 6/30/2020  Diflucan 150 mg oral tablet: 1 tab(s) orally once a week starting on June 22, 2020  through December 2020  Dilaudid 2 mg oral tablet: 1 tab(s) orally every 4 hours MDD:6 tabs  glucometer (per patient&#x27;s insurance): Test blood sugars four times a day. Dispense #1 glucometer.  Insulin Pen Needles, 4mm: 1 application subcutaneously 4 times a day. ** Use with insulin pen **   losartan 25 mg oral tablet: 1 tab(s) orally once a day  metroNIDAZOLE 500 mg oral tablet: 1 tab(s) orally every 8 hours through 7/3/2020  nystatin 100,000 units/g topical powder: 1 application topically every 8 hours  test strips (per patient&#x27;s insurance): 1 application subcutaneously 4 times a day. ** Compatible with patient&#x27;s glucometer **

## 2020-06-19 NOTE — DISCHARGE NOTE PROVIDER - NSFOLLOWUPCLINICS_GEN_ALL_ED_FT
[facility]  Family Medicine  [facility]  [place], [place] Replaced by [facility]  Phone: [phone]  Fax:   Follow Up Time:

## 2020-06-19 NOTE — DIETITIAN INITIAL EVALUATION ADULT. - PROBLEM SELECTOR PLAN 7
Patient with known history of hypertension, on home labetalol 600mg TID  - /95 in ED  - discontinue home labetalol 600mg TID  - Start losartan 50mg daily tomorrow  - monitor BP  - f/u EKG    #Migraine headaches  - Pt complains of severe headaches around b/l temples and back of head  - Associated with episodes of high blood pressure  - Motrin PRN for headache (Tylenol ineffective per patient)  - Management of HTN as above  - Consider outpatient follow-up with neurology

## 2020-06-19 NOTE — DIETITIAN INITIAL EVALUATION ADULT. - OTHER INFO
39y/o morbidly obese female with history of 2TDM,HTN,HLD,Vulvovaginal candidiasis presents with 2 weeks of vaginal pain, swelling and severe sepsis. Patient stated, she barely eats and still has poorly controlled DM. She is aware of CHO and avoids concentrated sweets .Claims she barely eats more than 1 meal a day and usually is only thirsty .No N/V/D/C .Skin with rash at groin.No PU.. Has pain in vaginal area and HA. Denied any recent weight changes despite reported poor intake Patient attributes high HGBA1C to pain and stress .Patient deferred extensive teaching at this time. Wanted to eat breakfast. RD will follow up and encourage follow up with outpatient RD and discuss bariatric options and considerations.

## 2020-06-19 NOTE — DISCHARGE NOTE PROVIDER - HOSPITAL COURSE
#Discharge: do not delete        Patient is __ yo M/F with past medical history of _____    Presented with _____, found to have _____        Problem List/Main Diagnoses (system-based):         Inpatient treatment course:         New medications:     Labs to be followed outpatient:     Exam to be followed outpatient: #Discharge: do not delete        Patient is __ yo M/F with past medical history of _____    Presented with _____, found to have _____        Problem List/Main Diagnoses (system-based):     -DM:        Inpatient treatment course:         New medications:     Labs to be followed outpatient:     Exam to be followed outpatient: 40F, morbidly obese, with PMH of uncontrolled DM2, HTN, HLD, and recent admission for vulvovaginal candidiasis (treated with fluconazole, clotrimazole) and e.Coli UTI, who presented with 2wks of worsening vaginal pain, redness, and swelling, admitted for severe sepsis 2/2 vulvovaginal cellulitis and candidiasis w/ concomitant UTI. Gyn consulted and perineal cultures obtained. Surgery consulted for concern for abscess of buttock. Pt started on broad spectrum antibiotics and wound care administered. She was taken to OR by surgery for thorough examination and found to have no drainable abscess and wound was packed. Cultures of periuneum growing provatella and yeast. Wound cultures growing MSSA, klebsiella, and S. anginosus. ABX deescalated per sesntivities in setting of underlying PCN allergy.        Endocrine consulted for poorly controlled DM. Started on lantus/lispro regimen with FSG improving with insulin and infection source control. Pt with resolution of leukocytosis. Wound improving.         Pain controlled with PO dilaudid. Pt remaining HDS and medically optimized. To be d/c'd to home with home care wound services and PO ABX.        Abscess/cellulitis: flagyl 500 q8h for additional 13 days, cefpodoxime 400 BID for additional 10 days, fluconzaole 150 mg weekly x6 months    Pain: Dilaudid 2 mg PO PRN     Insulin: lantus 60 qhs; lispro 25 TID before meals             F/u with endocrine and surgery made.

## 2020-06-19 NOTE — PROGRESS NOTE ADULT - SUBJECTIVE AND OBJECTIVE BOX
Pt seen and examined at bedside, no acute event overnight, haemodynamically stable. Denies fever, chills, nausea, vomiting, diarrhoea. Report buttocks pain improved although still present    Vital Signs Last 24 Hrs  T(C): 36.8 (19 Jun 2020 05:23), Max: 36.9 (18 Jun 2020 20:39)  T(F): 98.3 (19 Jun 2020 05:23), Max: 98.4 (18 Jun 2020 20:39)  HR: 95 (19 Jun 2020 05:23) (74 - 95)  BP: 161/97 (19 Jun 2020 05:23) (125/70 - 161/97)  BP(mean): 96 (18 Jun 2020 18:12) (91 - 112)  RR: 20 (19 Jun 2020 05:23) (7 - 25)  SpO2: 98% (19 Jun 2020 05:23) (98% - 100%)    Physical Exam:  General: appears comfortable, NAD  HEENT: MMM  Pulmonary: nonlabored breathing, normal resp effort  Cardiovascular: RRR  Abdominal: morbidly obese, soft, nontender, nondistended  : Diffuse erythematous changes to perineum left > right,  left buttocks erythema improved compared to yesterday, packing removed, wound clean and dry without active bleeding. Less tender compared to yesterday.    Extremities: WWP, no edema, no calf tenderness  Neuro: no focal deficits    Lines/drains/tubes:    I&O's Summary    18 Jun 2020 07:01  -  19 Jun 2020 07:00  --------------------------------------------------------  IN: 100 mL / OUT: 0 mL / NET: 100 mL        LABS:                        12.0   7.22  )-----------( 302      ( 19 Jun 2020 06:19 )             39.1     06-19    138  |  102  |  4<L>  ----------------------------<  205<H>  4.0   |  25  |  0.62    Ca    8.3<L>      19 Jun 2020 06:19  Phos  2.9     06-19  Mg     1.6     06-19    TPro  5.9<L>  /  Alb  2.7<L>  /  TBili  0.2  /  DBili  x   /  AST  45<H>  /  ALT  32  /  AlkPhos  134<H>  06-18        CAPILLARY BLOOD GLUCOSE      POCT Blood Glucose.: 199 mg/dL (19 Jun 2020 12:27)  POCT Blood Glucose.: 175 mg/dL (19 Jun 2020 07:52)  POCT Blood Glucose.: 301 mg/dL (18 Jun 2020 22:20)  POCT Blood Glucose.: 147 mg/dL (18 Jun 2020 16:50)  POCT Blood Glucose.: 140 mg/dL (18 Jun 2020 13:47)    LIVER FUNCTIONS - ( 18 Jun 2020 07:15 )  Alb: 2.7 g/dL / Pro: 5.9 g/dL / ALK PHOS: 134 U/L / ALT: 32 U/L / AST: 45 U/L / GGT: x             RADIOLOGY & ADDITIONAL STUDIES:

## 2020-06-19 NOTE — PROGRESS NOTE ADULT - PROBLEM SELECTOR PLAN 7
Known hx of hypertension, on labetalol 300mg TID at home. Pt without PCP, recently referred to [facility] for further management. Pt likely uncontrolled, endorses home -170/100s.  -c/w losartan 25mg QD for blood pressure control    #HLD  Pt with hyperlipidemia, no prior hx of statin. Started on atorvastatin 40mg QD during admission  -C/w atorvastatin 40mg QD

## 2020-06-19 NOTE — DIETITIAN INITIAL EVALUATION ADULT. - ADD RECOMMEND
1.Follow up with diet education and further discussion. Refer to outpatient RD for extensive teaching

## 2020-06-19 NOTE — PROGRESS NOTE ADULT - SUBJECTIVE AND OBJECTIVE BOX
INTERVAL HPI/OVERNIGHT EVENTS:    Patient is a 40y old  Female who presents with a chief complaint of vaginal candidiasis (2020 07:14)  patient seen and examined at the bedside  s/p Exam under anaesthesia for the vaginitis and abscess revealed no tracking, and an 5 cm indurated area at perianal verge on left side  Patient was still c/o pain and was not feeling well. She is frustrated about the Abx regimen as she has many allergies.    FSG & Insulin received:  Yesterday:  pre-dinner fs, no insulin given, didn't eat dinner  bedtime fs, 60 lantus   units +  8  units lispro SS  Today:  pre-breakfast fs, 25 nutritional lispro   units+  2  units lispro SS, She ate about 1/2 tray. per RN no starch.  pre-lunch fs      Pt reports the following symptoms:  CONSTITUTIONAL:  Negative fever or chills  CARDIOVASCULAR:  Negative for chest pain or palpitations  RESPIRATORY:  Negative for cough, wheezing, or SOB   GASTROINTESTINAL:  Negative for vomiting, diarrhea, constipation, or abdominal pain  NEUROLOGIC:  No headache, confusion, dizziness, lightheadedness    MEDICATIONS  (STANDING):  atorvastatin 40 milliGRAM(s) Oral at bedtime  BUpivacaine liposome 1.3% Injectable (no eMAR) 20 milliLiter(s) Local Injection once  cefTRIAXone   IVPB 1000 milliGRAM(s) IV Intermittent every 24 hours  dextrose 5%. 1000 milliLiter(s) (50 mL/Hr) IV Continuous <Continuous>  dextrose 50% Injectable 12.5 Gram(s) IV Push once  dextrose 50% Injectable 25 Gram(s) IV Push once  dextrose 50% Injectable 25 Gram(s) IV Push once  enoxaparin Injectable 40 milliGRAM(s) SubCutaneous every 12 hours  ergocalciferol 98573 Unit(s) Oral daily  fluconAZOLE   Tablet 150 milliGRAM(s) Oral every 72 hours  insulin glargine Injectable (LANTUS) 60 Unit(s) SubCutaneous at bedtime  insulin lispro (HumaLOG) corrective regimen sliding scale   SubCutaneous Before meals and at bedtime  insulin lispro Injectable (HumaLOG) 25 Unit(s) SubCutaneous three times a day before meals  losartan 25 milliGRAM(s) Oral daily  magnesium sulfate  IVPB 4 Gram(s) IV Intermittent once  metroNIDAZOLE    Tablet 500 milliGRAM(s) Oral every 8 hours  nystatin Powder 1 Application(s) Topical every 8 hours    MEDICATIONS  (PRN):  calamine/zinc oxide Lotion 1 Application(s) Topical two times a day PRN Rash and/or Itching  dextrose 40% Gel 15 Gram(s) Oral once PRN Blood Glucose LESS THAN 70 milliGRAM(s)/deciliter  glucagon  Injectable 1 milliGRAM(s) IntraMuscular once PRN Glucose LESS THAN 70 milligrams/deciliter  HYDROmorphone   Tablet 2 milliGRAM(s) Oral every 4 hours PRN Breakthrough  hydrOXYzine hydrochloride 25 milliGRAM(s) Oral every 4 hours PRN Itching  lidocaine 5% Ointment 1 Application(s) Topical four times a day PRN Pain      PHYSICAL EXAM  Vital Signs Last 24 Hrs  T(C): 36.8 (2020 05:23), Max: 36.9 (2020 20:39)  T(F): 98.3 (2020 05:23), Max: 98.4 (2020 20:39)  HR: 95 (2020 05:23) (74 - 95)  BP: 161/97 (2020 05:23) (125/70 - 161/97)  BP(mean): 96 (2020 18:12) (91 - 112)  RR: 20 (2020 05:23) (7 - 25)  SpO2: 98% (2020 05:23) (98% - 100%)    Constitutional: in mild distress  Respiratory: lungs CTAB.  Cardiovascular: regular rhythm, normal S1 and S2, no peripheral edema  GI: soft, NT/ND, no masses/HSM appreciated.  Neurology: no tremors, DTR 2+    LABS:                        12.0   7.22  )-----------( 302      ( 2020 06:19 )             39.1     -    138  |  102  |  4<L>  ----------------------------<  205<H>  4.0   |  25  |  0.62    Ca    8.3<L>      2020 06:19  Phos  2.9     -19  Mg     1.6     -19    TPro  5.9<L>  /  Alb  2.7<L>  /  TBili  0.2  /  DBili  x   /  AST  45<H>  /  ALT  32  /  AlkPhos  134<H>          Thyroid Stimulating Hormone, Serum: 1.782 uIU/mL ( @ 09:29)      HbA1C: 12.0 % ( @ 07:11)    CAPILLARY BLOOD GLUCOSE      POCT Blood Glucose.: 199 mg/dL (2020 12:27)  POCT Blood Glucose.: 175 mg/dL (2020 07:52)  POCT Blood Glucose.: 301 mg/dL (2020 22:20)  POCT Blood Glucose.: 147 mg/dL (2020 16:50)  POCT Blood Glucose.: 140 mg/dL (2020 13:47)      Will discuss in rounds  A/P: 40F  with last delivery in 2019 c/b post-operative wound infection of abdomen requiring IR drainage, with hx of multiple abdominal washouts for SSI requiring PICC line for abx & hernia repair w/ mesh numerous times (Dr. Hurtado) and other PMH of DVT, uncontrolled DM2, HTN, HLD, admitted to [facility] in March for vaginal yeast infection admitted again with vulvovaginitis.    1.  DM type 2 - uncontrolled - with complications and hyperglycemia  - hba1c 12.9  Cr/GFR 0.58/ 134  Wt 133.7 kg with BMI 46.2  carb consistent diet  Please continue Lantus  60 units at night.   Please continue Lispro 30 units before each meal.   Please continue lispro moderate dose sliding scale four times daily with meals and at bedtime  Pt's fingerstick glucose goal is 120 to 150  CDE Lissa and nutrition to be consulted    We will check her morning FSg and dose her with lantus in the morning if needed. Her insulin requirements may decrease if they drained any abscess during the procedure    2. Morbid obesity  BMI 46.2  OP Obesity management - will benefit from bariatric surgery    3. Vit D deficiency  - vit D level was 14.4  - PLease continue Vitamin D 80100 IU once daily for 3 doses    Will continue to monitor     For discharge, TBD    Pt can follow up at discharge with [facility] by calling  to make an appointment.   discussed case with  and updated primary team      To Make an appointment at [address] [place] [address] for the patient, either:  1. Send a STAT task via [employer] to Meagan Jackson or Zuleyka Figueroa (office managers)   OR  2. Call supervisor's line. P: [phone] (do not give this number to patient). VM is checked periodically.  In the message, specify that this is a hospital discharge follow-up, and that the appt can be made with a NP if there is no timely MD availability.    REMINDERS FOR INSULIN/DIABETES SUPPLIES at DISCHARGE:  INSULIN:   Long actin/Basal Insulin: Examples: Toujeo, Basaglar, Tresiba, Lantus   Short acting/Bolus Insulin: Humalog, Admelog, Novolog  Please ensure that BOTH short acting and long acting insulin are prescribed in the same preparation (Ex: PEN vs VIAL/SOLUTION)     TESTING SUPPLIES:   All glucometer supplies should be written as generic to avoid issues with insurance. Use the free text option in sunrise prescription writer, and type in glucometer test strips, lancets, etc to order.    If sending patient home on insulin PEN, please send:   •	BD cammy insulin pen needles for use up to 4 times daily (total quantity 100)  •	Lancets for use up to 4 times daily (total quantity 100)  •	Glucometer Test strips for use up to 4 times daily (total quantity 100)  •	Alcohol swabs for use up to 4 times daily (total quantity 100)  •	Glucometer (If provided by hospital, still provide scripts for lancets, test strips, and swabs)  If sending patient home on insulin VIAL, please send:   •	Insulin syringes (6mm) - for use up to 4 times daily (total quantity 100)  •	Lancets for use up to 4 times daily (total quantity 100)  •	Generic Glucometer Test strips for use up to 4 times daily (total quantity 100)  •	Alcohol swabs for use up to 4 times daily (total quantity 100)  •	Generic Glucometer (If provided by hospital, still provide scripts for lancets, test strips, and swabs)  •	Do not specify brand for testing supplies (such as contour, freestyle, one touch etc) that way the pharmacy has the freedom to pick and change according to what the insurance dictates.  For patients without insurance:   •	Provide social work with appropriate scripts so they may obtain 1 week of samples  •	Provide with glucometer. Glucometers are located at various nursing stations, the nursing office, education, and endocrine fellows office.  •	Please make appointment with Faby Cleary NP or Hue Deras RN and BOYD Chatman at the [address] endocrinology clinic. They can see patients without insurance, provide appropriate samples, and assist in getting insurance coverage.     PREFERRED PHARMACY:  [facility] (located on 1st floor next to admitting)  P: [phone]  Hours: M – F 8AM – 8PM, Sat 8AM – 4PM, Sun—closed  If not using [facility], please follow up with chosen pharmacy to ensure insulin prescribed is covered. INTERVAL HPI/OVERNIGHT EVENTS:    Patient is a 40y old  Female who presents with a chief complaint of vaginal candidiasis (2020 07:14)  patient seen and examined at the bedside  s/p Exam under anaesthesia for the vaginitis and abscess revealed no tracking, and an 5 cm indurated area at perianal verge on left side  Patient was still c/o pain and was not feeling well. She is frustrated about the Abx regimen as she has many allergies.    FSG & Insulin received:  Yesterday:  pre-dinner fs, no insulin given, didn't eat dinner  bedtime fs, 60 lantus   units +  8  units lispro SS  Today:  pre-breakfast fs, 25 nutritional lispro   units+  2  units lispro SS, She ate about 1/2 tray. per RN no starch.  pre-lunch fs      Pt reports the following symptoms:  CONSTITUTIONAL:  Negative fever or chills  CARDIOVASCULAR:  Negative for chest pain or palpitations  RESPIRATORY:  Negative for cough, wheezing, or SOB   GASTROINTESTINAL:  Negative for vomiting, diarrhea, constipation, or abdominal pain  NEUROLOGIC:  No headache, confusion, dizziness, lightheadedness    MEDICATIONS  (STANDING):  atorvastatin 40 milliGRAM(s) Oral at bedtime  BUpivacaine liposome 1.3% Injectable (no eMAR) 20 milliLiter(s) Local Injection once  cefTRIAXone   IVPB 1000 milliGRAM(s) IV Intermittent every 24 hours  dextrose 5%. 1000 milliLiter(s) (50 mL/Hr) IV Continuous <Continuous>  dextrose 50% Injectable 12.5 Gram(s) IV Push once  dextrose 50% Injectable 25 Gram(s) IV Push once  dextrose 50% Injectable 25 Gram(s) IV Push once  enoxaparin Injectable 40 milliGRAM(s) SubCutaneous every 12 hours  ergocalciferol 16062 Unit(s) Oral daily  fluconAZOLE   Tablet 150 milliGRAM(s) Oral every 72 hours  insulin glargine Injectable (LANTUS) 60 Unit(s) SubCutaneous at bedtime  insulin lispro (HumaLOG) corrective regimen sliding scale   SubCutaneous Before meals and at bedtime  insulin lispro Injectable (HumaLOG) 25 Unit(s) SubCutaneous three times a day before meals  losartan 25 milliGRAM(s) Oral daily  magnesium sulfate  IVPB 4 Gram(s) IV Intermittent once  metroNIDAZOLE    Tablet 500 milliGRAM(s) Oral every 8 hours  nystatin Powder 1 Application(s) Topical every 8 hours    MEDICATIONS  (PRN):  calamine/zinc oxide Lotion 1 Application(s) Topical two times a day PRN Rash and/or Itching  dextrose 40% Gel 15 Gram(s) Oral once PRN Blood Glucose LESS THAN 70 milliGRAM(s)/deciliter  glucagon  Injectable 1 milliGRAM(s) IntraMuscular once PRN Glucose LESS THAN 70 milligrams/deciliter  HYDROmorphone   Tablet 2 milliGRAM(s) Oral every 4 hours PRN Breakthrough  hydrOXYzine hydrochloride 25 milliGRAM(s) Oral every 4 hours PRN Itching  lidocaine 5% Ointment 1 Application(s) Topical four times a day PRN Pain      PHYSICAL EXAM  Vital Signs Last 24 Hrs  T(C): 36.8 (2020 05:23), Max: 36.9 (2020 20:39)  T(F): 98.3 (2020 05:23), Max: 98.4 (2020 20:39)  HR: 95 (2020 05:23) (74 - 95)  BP: 161/97 (2020 05:23) (125/70 - 161/97)  BP(mean): 96 (2020 18:12) (91 - 112)  RR: 20 (2020 05:23) (7 - 25)  SpO2: 98% (2020 05:23) (98% - 100%)    Constitutional: in mild distress  Respiratory: lungs CTAB.  Cardiovascular: regular rhythm, normal S1 and S2, no peripheral edema  GI: soft, NT/ND, no masses/HSM appreciated.  Neurology: no tremors, DTR 2+    LABS:                        12.0   7.22  )-----------( 302      ( 2020 06:19 )             39.1     -    138  |  102  |  4<L>  ----------------------------<  205<H>  4.0   |  25  |  0.62    Ca    8.3<L>      2020 06:19  Phos  2.9     -19  Mg     1.6     -19    TPro  5.9<L>  /  Alb  2.7<L>  /  TBili  0.2  /  DBili  x   /  AST  45<H>  /  ALT  32  /  AlkPhos  134<H>          Thyroid Stimulating Hormone, Serum: 1.782 uIU/mL ( @ 09:29)      HbA1C: 12.0 % ( @ 07:11)    CAPILLARY BLOOD GLUCOSE      POCT Blood Glucose.: 199 mg/dL (2020 12:27)  POCT Blood Glucose.: 175 mg/dL (2020 07:52)  POCT Blood Glucose.: 301 mg/dL (2020 22:20)  POCT Blood Glucose.: 147 mg/dL (2020 16:50)  POCT Blood Glucose.: 140 mg/dL (2020 13:47)      Will discuss in rounds  A/P: 40F  with last delivery in 2019 c/b post-operative wound infection of abdomen requiring IR drainage, with hx of multiple abdominal washouts for SSI requiring PICC line for abx & hernia repair w/ mesh numerous times (Dr. Hurtado) and other PMH of DVT, uncontrolled DM2, HTN, HLD, admitted to [facility] in March for vaginal yeast infection admitted again with vulvovaginitis.    1.  DM type 2 - uncontrolled - with complications and hyperglycemia  - hba1c 12.9  Cr/GFR 0.58/ 134  Wt 133.7 kg with BMI 46.2  carb consistent diet  Please continue Lantus  60 units at night.   Please continue Lispro 30 units before each meal.   Please continue lispro moderate dose sliding scale four times daily with meals and at bedtime  Pt's fingerstick glucose goal is 120 to 150  CDE Lissa and nutrition to be consulted    2. Morbid obesity  BMI 46.2  OP Obesity management - will benefit from bariatric surgery    3. Vit D deficiency  - vit D level was 14.4  - PLease continue Vitamin D 31028 IU once daily for 3 doses    Will continue to monitor     For discharge, TBD    Pt can follow up at discharge with [facility] by calling  to make an appointment.   discussed case with  and updated primary team      To Make an appointment at [address] [place] [address] for the patient, either:  1. Send a STAT task via [employer] to Meagan Jackson or Zuleyka Figueroa (office managers)   OR  2. Call supervisor's line. P: [phone] (do not give this number to patient). VM is checked periodically.  In the message, specify that this is a hospital discharge follow-up, and that the appt can be made with a NP if there is no timely MD availability.    REMINDERS FOR INSULIN/DIABETES SUPPLIES at DISCHARGE:  INSULIN:   Long actin/Basal Insulin: Examples: Toujeo, Basaglar, Tresiba, Lantus   Short acting/Bolus Insulin: Humalog, Admelog, Novolog  Please ensure that BOTH short acting and long acting insulin are prescribed in the same preparation (Ex: PEN vs VIAL/SOLUTION)     TESTING SUPPLIES:   All glucometer supplies should be written as generic to avoid issues with insurance. Use the free text option in sunrise prescription writer, and type in glucometer test strips, lancets, etc to order.    If sending patient home on insulin PEN, please send:   •	BD cammy insulin pen needles for use up to 4 times daily (total quantity 100)  •	Lancets for use up to 4 times daily (total quantity 100)  •	Glucometer Test strips for use up to 4 times daily (total quantity 100)  •	Alcohol swabs for use up to 4 times daily (total quantity 100)  •	Glucometer (If provided by hospital, still provide scripts for lancets, test strips, and swabs)  If sending patient home on insulin VIAL, please send:   •	Insulin syringes (6mm) - for use up to 4 times daily (total quantity 100)  •	Lancets for use up to 4 times daily (total quantity 100)  •	Generic Glucometer Test strips for use up to 4 times daily (total quantity 100)  •	Alcohol swabs for use up to 4 times daily (total quantity 100)  •	Generic Glucometer (If provided by hospital, still provide scripts for lancets, test strips, and swabs)  •	Do not specify brand for testing supplies (such as contour, freestyle, one touch etc) that way the pharmacy has the freedom to pick and change according to what the insurance dictates.  For patients without insurance:   •	Provide social work with appropriate scripts so they may obtain 1 week of samples  •	Provide with glucometer. Glucometers are located at various nursing stations, the nursing office, education, and endocrine fellows office.  •	Please make appointment with Faby Cleary NP or Hue Deras RN and BOYD Chatman at the [address] [place] [address] endocrinology clinic. They can see patients without insurance, provide appropriate samples, and assist in getting insurance coverage.     PREFERRED PHARMACY:  [facility] (located on 1st floor next to admitting)  P: [phone]  Hours: M – F 8AM – 8PM, Sat 8AM – 4PM, Sun—closed  If not using [facility], please follow up with chosen pharmacy to ensure insulin prescribed is covered. INTERVAL HPI/OVERNIGHT EVENTS:    Patient is a 40y old  Female who presents with a chief complaint of vaginal candidiasis (2020 07:14)  patient seen and examined at the bedside  s/p Exam under anaesthesia for the vaginitis and abscess revealed no tracking, and an 5 cm indurated area at perianal verge on left side  Patient was still c/o pain and was not feeling well. She is frustrated about the Abx regimen as she has many allergies.    FSG & Insulin received:  Yesterday:  pre-dinner fs, no insulin given, didn't eat dinner  bedtime fs, 60 lantus   units +  8  units lispro SS  Today:  pre-breakfast fs, 25 nutritional lispro   units+  2  units lispro SS, She ate about 1/2 tray. per RN no starch.  pre-lunch fs,  25 nutritional lispro   units+  2  units lispro SS      Pt reports the following symptoms:  CONSTITUTIONAL:  Negative fever or chills  CARDIOVASCULAR:  Negative for chest pain or palpitations  RESPIRATORY:  Negative for cough, wheezing, or SOB   GASTROINTESTINAL:  Negative for vomiting, diarrhea, constipation, or abdominal pain  NEUROLOGIC:  No headache, confusion, dizziness, lightheadedness    MEDICATIONS  (STANDING):  atorvastatin 40 milliGRAM(s) Oral at bedtime  BUpivacaine liposome 1.3% Injectable (no eMAR) 20 milliLiter(s) Local Injection once  cefTRIAXone   IVPB 1000 milliGRAM(s) IV Intermittent every 24 hours  dextrose 5%. 1000 milliLiter(s) (50 mL/Hr) IV Continuous <Continuous>  dextrose 50% Injectable 12.5 Gram(s) IV Push once  dextrose 50% Injectable 25 Gram(s) IV Push once  dextrose 50% Injectable 25 Gram(s) IV Push once  enoxaparin Injectable 40 milliGRAM(s) SubCutaneous every 12 hours  ergocalciferol 85116 Unit(s) Oral daily  fluconAZOLE   Tablet 150 milliGRAM(s) Oral every 72 hours  insulin glargine Injectable (LANTUS) 60 Unit(s) SubCutaneous at bedtime  insulin lispro (HumaLOG) corrective regimen sliding scale   SubCutaneous Before meals and at bedtime  insulin lispro Injectable (HumaLOG) 25 Unit(s) SubCutaneous three times a day before meals  losartan 25 milliGRAM(s) Oral daily  magnesium sulfate  IVPB 4 Gram(s) IV Intermittent once  metroNIDAZOLE    Tablet 500 milliGRAM(s) Oral every 8 hours  nystatin Powder 1 Application(s) Topical every 8 hours    MEDICATIONS  (PRN):  calamine/zinc oxide Lotion 1 Application(s) Topical two times a day PRN Rash and/or Itching  dextrose 40% Gel 15 Gram(s) Oral once PRN Blood Glucose LESS THAN 70 milliGRAM(s)/deciliter  glucagon  Injectable 1 milliGRAM(s) IntraMuscular once PRN Glucose LESS THAN 70 milligrams/deciliter  HYDROmorphone   Tablet 2 milliGRAM(s) Oral every 4 hours PRN Breakthrough  hydrOXYzine hydrochloride 25 milliGRAM(s) Oral every 4 hours PRN Itching  lidocaine 5% Ointment 1 Application(s) Topical four times a day PRN Pain      PHYSICAL EXAM  Vital Signs Last 24 Hrs  T(C): 36.8 (2020 05:23), Max: 36.9 (2020 20:39)  T(F): 98.3 (2020 05:23), Max: 98.4 (2020 20:39)  HR: 95 (2020 05:23) (74 - 95)  BP: 161/97 (2020 05:23) (125/70 - 161/97)  BP(mean): 96 (2020 18:12) (91 - 112)  RR: 20 (2020 05:23) (7 - 25)  SpO2: 98% (2020 05:23) (98% - 100%)    Constitutional: in mild distress  Respiratory: lungs CTAB.  Cardiovascular: regular rhythm, normal S1 and S2, + pedal and ankle edema 1+  GI: soft, NT/ND, no masses/HSM appreciated.  Neurology: no tremors, DTR 2+    LABS:                        12.0   7.22  )-----------( 302      ( 2020 06:19 )             39.1     -    138  |  102  |  4<L>  ----------------------------<  205<H>  4.0   |  25  |  0.62    Ca    8.3<L>      2020 06:19  Phos  2.9     -  Mg     1.6     -19    TPro  5.9<L>  /  Alb  2.7<L>  /  TBili  0.2  /  DBili  x   /  AST  45<H>  /  ALT  32  /  AlkPhos  134<H>  18        Thyroid Stimulating Hormone, Serum: 1.782 uIU/mL ( @ 09:29)      HbA1C: 12.0 % ( @ 07:11)    CAPILLARY BLOOD GLUCOSE      POCT Blood Glucose.: 199 mg/dL (2020 12:27)  POCT Blood Glucose.: 175 mg/dL (2020 07:52)  POCT Blood Glucose.: 301 mg/dL (2020 22:20)  POCT Blood Glucose.: 147 mg/dL (2020 16:50)  POCT Blood Glucose.: 140 mg/dL (2020 13:47)      A/P: 40F  with last delivery in 2019 c/b post-operative wound infection of abdomen requiring IR drainage, with hx of multiple abdominal washouts for SSI requiring PICC line for abx & hernia repair w/ mesh numerous times (Dr. Hurtado) and other PMH of DVT, uncontrolled DM2, HTN, HLD, admitted to [facility] in March for vaginal yeast infection admitted again with vulvovaginitis.    1.  DM type 2 - uncontrolled - with complications and hyperglycemia  - hba1c 12.9  Cr/GFR 0.58/ 134  Wt 133.7 kg with BMI 46.2  carb consistent diet  Please continue Lantus  60 units at night.   Please continue Lispro 25 units before each meal.   Please continue lispro moderate dose sliding scale four times daily with meals and at bedtime  Pt's fingerstick glucose goal is 120 to 150  CDE Lissa and nutrition to be consulted    2. Morbid obesity  BMI 46.2  OP Obesity management - will benefit from bariatric surgery    3. Vit D deficiency  - vit D level was 14.4  - PLease continue Vitamin D 59853 IU once daily for 3 doses    Will continue to monitor     For discharge, TBD    Pt can follow up at discharge with [facility] by calling  to make an appointment.   discussed case with  and updated primary team      To Make an appointment at [address] [place] [address] for the patient, either:  1. Send a STAT task via [employer] to Meagan Jackson or Zuleyka Figueroa (office managers)   OR  2. Call supervisor's line. P: [phone] (do not give this number to patient). VM is checked periodically.  In the message, specify that this is a hospital discharge follow-up, and that the appt can be made with a NP if there is no timely MD availability.    REMINDERS FOR INSULIN/DIABETES SUPPLIES at DISCHARGE:  INSULIN:   Long actin/Basal Insulin: Examples: Toujeo, Basaglar, Tresiba, Lantus   Short acting/Bolus Insulin: Humalog, Admelog, Novolog  Please ensure that BOTH short acting and long acting insulin are prescribed in the same preparation (Ex: PEN vs VIAL/SOLUTION)     TESTING SUPPLIES:   All glucometer supplies should be written as generic to avoid issues with insurance. Use the free text option in sunrise prescription writer, and type in glucometer test strips, lancets, etc to order.    If sending patient home on insulin PEN, please send:   •	BD cammy insulin pen needles for use up to 4 times daily (total quantity 100)  •	Lancets for use up to 4 times daily (total quantity 100)  •	Glucometer Test strips for use up to 4 times daily (total quantity 100)  •	Alcohol swabs for use up to 4 times daily (total quantity 100)  •	Glucometer (If provided by hospital, still provide scripts for lancets, test strips, and swabs)  If sending patient home on insulin VIAL, please send:   •	Insulin syringes (6mm) - for use up to 4 times daily (total quantity 100)  •	Lancets for use up to 4 times daily (total quantity 100)  •	Generic Glucometer Test strips for use up to 4 times daily (total quantity 100)  •	Alcohol swabs for use up to 4 times daily (total quantity 100)  •	Generic Glucometer (If provided by hospital, still provide scripts for lancets, test strips, and swabs)  •	Do not specify brand for testing supplies (such as contour, freestyle, one touch etc) that way the pharmacy has the freedom to pick and change according to what the insurance dictates.  For patients without insurance:   •	Provide social work with appropriate scripts so they may obtain 1 week of samples  •	Provide with glucometer. Glucometers are located at various nursing stations, the nursing office, education, and endocrine fellows office.  •	Please make appointment with Faby Cleary NP or Hue Deras RN and BOYD Chatman at the [address] [place] [address] endocrinology clinic. They can see patients without insurance, provide appropriate samples, and assist in getting insurance coverage.     PREFERRED PHARMACY:  [facility] (located on 1st floor next to admitting)  P: [phone]  Hours: M – F 8AM – 8PM, Sat 8AM – 4PM, Sun—closed  If not using [facility], please follow up with chosen pharmacy to ensure insulin prescribed is covered.

## 2020-06-19 NOTE — DISCHARGE NOTE PROVIDER - NSDCCPCAREPLAN_GEN_ALL_CORE_FT
PRINCIPAL DISCHARGE DIAGNOSIS  Diagnosis: Vulvovaginal candidiasis  Assessment and Plan of Treatment: You came to the hospital because of pain in your genital and anal region, and you were found to have a vulvovaginal yeast infection (which you have had before). A vulvovaginal yeast infection is an infection that causes itching and irritation of the vulva, the outer lips of the vagina. This type of infection is usually caused by a fungus called "candida." (Yeast are a type of fungus.) You were started on medicine during your admission, which you will continue after discharge. Our gyncology team evaluated you, and are recommending the following treatment:  -Please take fluconazole 150mg once a week starting Tuesday (6/22/2020). You will be on this medication for 6 months (through 12/29/2020)  -Please follow up with your gynecologist and primary care doctor regarding progression of your yeast infection.        SECONDARY DISCHARGE DIAGNOSES  Diagnosis: Cellulitis of perianal region  Assessment and Plan of Treatment: You came to the hospital due to an infection in of the skin and soft tissue in your genital and anal region. There was a small collection in your buttock, which was cleaned by our surgery team. You were started on antibiotics, which you will continue after you are discharged.  -Please take flagyl 500mg every 8hrs for two weeks (through 7/3/2020)  -Please take cefpodoxime 400mg twice a day (12hrs apart) for two weeks (through 6/30/2020)  -If you feel itchy from the antibiotics, you can take atarax prior to taking the antibiotic  -Follow up with general surgery regarding your buttock abscess    Diagnosis: Essential hypertension  Assessment and Plan of Treatment: You have a known history of high blood pressure prior to your admission. High blood pressure can cause damage to your heart and kidneys and increases your risk of heart attack and stroke. You were taking labetalol at home for blood pressure. During your hospitalization, you were transitioned to losartan.  -Please continue to take losartan once daily by mouth  -Follow up with your primary care physician on a regular basis to make sure your blood pressure is well-controlled  -If you experience symptoms such as but not limited to: sudden onset blurry vision, nausea, vomiting, chest pain, shortness of breath, or palpitations, please go to the nearest emergency room.    Diagnosis: Diabetes  Assessment and Plan of Treatment: You have a known history of diabetes mellitus prior to your admission. This condition results from blood sugar levels getting too high because your body is more resistant to insulin. Uncontrolled blood sugar levels can lead to kidney and heart damage, pain/numbness/paralysis in your hands and feet, and increased rates of infections.  To manage this you are on insulin. We adjusted your insulin regimen during your hospitalization.  -Please take your insulin as follows: 60 units of lantus insulin at night, 25 units of lispro insulin three times a day before meals.  -It is important to follow up with endocrinology regarding your diabetes control and how to prevent long-term damage.       Diagnosis: Urinary tract infection  Assessment and Plan of Treatment: While in the hospital you were noted to have a urinary tract infection (UTI). This was found based on your symptoms and a test of your urine. You were treated with IV antibiotics, and your urinary tract infection resolved.  -Please follow up your primary care doctor to discuss your recent hospitalization and whether any changes need to be made with your current medication regimen  -Please see a medical professional if you begin to experience any of the following symptoms: unrelenting/severe fever (temperature >103 F and/or lasting >3 days), worsening pain on urination, urinary discharge, increased urinary frequency, chills, severe flank/lower back pain, or bloody urine. PRINCIPAL DISCHARGE DIAGNOSIS  Diagnosis: Vulvovaginal candidiasis  Assessment and Plan of Treatment: You came to the hospital because of pain in your genital and anal region, and you were found to have a vulvovaginal yeast infection (which you have had before). A vulvovaginal yeast infection is an infection that causes itching and irritation of the vulva, the outer lips of the vagina. This type of infection is usually caused by a fungus called "candida." (Yeast are a type of fungus.) You were started on medicine during your admission, which you will continue after discharge. Our gyncology team evaluated you, and are recommending the following treatment:  -Please take fluconazole 150mg once a week starting Tuesday (6/22/2020). You will be on this medication for 6 months (through 12/29/2020)  -Please follow up with your gynecologist and primary care doctor regarding progression of your yeast infection.        SECONDARY DISCHARGE DIAGNOSES  Diagnosis: Diabetes  Assessment and Plan of Treatment: You have a known history of diabetes mellitus prior to your admission. This condition results from blood sugar levels getting too high because your body is more resistant to insulin. Uncontrolled blood sugar levels can lead to kidney and heart damage, pain/numbness/paralysis in your hands and feet, and increased rates of infections.  To manage this you are on insulin. We adjusted your insulin regimen during your hospitalization.  -Please take your insulin as follows: 60 units of lantus insulin at night, 25 units of lispro insulin three times a day before meals.  -It is important to follow up with endocrinology regarding your diabetes control and how to prevent long-term damage.       Diagnosis: Urinary tract infection  Assessment and Plan of Treatment: While in the hospital you were noted to have a urinary tract infection (UTI). This was found based on your symptoms and a test of your urine. You were treated with IV antibiotics, and your urinary tract infection resolved.  -Please follow up your primary care doctor to discuss your recent hospitalization and whether any changes need to be made with your current medication regimen  -Please see a medical professional if you begin to experience any of the following symptoms: unrelenting/severe fever (temperature >103 F and/or lasting >3 days), worsening pain on urination, urinary discharge, increased urinary frequency, chills, severe flank/lower back pain, or bloody urine.    Diagnosis: Cellulitis of perianal region  Assessment and Plan of Treatment: You came to the hospital due to an infection in of the skin and soft tissue in your genital and anal region. There was a small collection in your buttock, which was cleaned by our surgery team. You were started on antibiotics, which you will continue after you are discharged.  -Please take flagyl 500mg every 8hrs for two weeks (through 7/3/2020)  -Please take cefpodoxime 400mg twice a day (12hrs apart) for two weeks (through 6/30/2020)  -If you feel itchy from the antibiotics, you can take benedryl prior to taking the antibiotic  -Follow up with general surgery regarding your buttock abscess    Diagnosis: Essential hypertension  Assessment and Plan of Treatment: You have a known history of high blood pressure prior to your admission. High blood pressure can cause damage to your heart and kidneys and increases your risk of heart attack and stroke. You were taking labetalol at home for blood pressure. During your hospitalization, you were transitioned to losartan.  -Please continue to take losartan once daily by mouth  -Follow up with your primary care physician on a regular basis to make sure your blood pressure is well-controlled  -If you experience symptoms such as but not limited to: sudden onset blurry vision, nausea, vomiting, chest pain, shortness of breath, or palpitations, please go to the nearest emergency room.

## 2020-06-19 NOTE — DIETITIAN INITIAL EVALUATION ADULT. - PROBLEM SELECTOR PLAN 5
Patient with insulin dependent diabetes. HgA1c 12, poorly controlled. Patient on home Lantus 35 units at bedtime and Lispro 30 units TID.   - received Lantus 30u in ED  - Lispro 30u TID  - Insulin sliding scale  - Lantus 35 units at bedtime starting 6/17  - monitor fingersticks  - Endocrine consult in AM    #Proteinuria  - Likely 2/2 poorly controlled DM  - f/u micoralbumin/Cr  - Management of DM as above

## 2020-06-19 NOTE — DIETITIAN INITIAL EVALUATION ADULT. - PROBLEM SELECTOR PLAN 9
F: s/p 4L NS  E: replete to K>4, Mg>2; Phos >3  N: Dash/TLC; CC  VTE Prophylaxis: Lovenox SubQ BID  C: Full Code  D: MANDI

## 2020-06-19 NOTE — DIETITIAN INITIAL EVALUATION ADULT. - PROBLEM SELECTOR PLAN 1
Meeting 2/4 SIRS criteria with tachycardia to HR 110s and leukocytosis to WBC 13 with lactate elevation to 5. No fevers or hypotension, rather patient hypertensive on presentation. Source likely severe vulvovaginitis candidiasis vs UTI.  Patient was recently admitted in March for vulvovaginitis and discharged on Fluconazole, for which she was compliant and noticed brief improvement in symptoms. However, shortly afterwards she noted worsening pain in vulva, vaginal discharge and dysuria which was not responding to over the counter antifungal medications. Likely in setting of poorly controlled DM  - s/p Fluconazole, Cefepime and Vancomycin in ED  - s/p 4L NS with repeat lactate 3.5  - f/u repeat lactate at 10pm, start maintenance fluids if still elevated  - Continue Fluconazole per GYN recommendations, patient will likely need prolonged course ~6 months after discharge  - Topical antifungal creams (nystatin powder)   - Keep area dry and clean  - Continue broad-spectrum antibiotics for UTI; may need to pretreat with benadryl  - Optimize glycemic control   - f/u surgery consult for evaluation on seen on CT on L buttocks that could represent phlegmon  - f/u Ucx, Bcx, vaginitis panel, cervical swab

## 2020-06-19 NOTE — PROGRESS NOTE ADULT - PROBLEM SELECTOR PLAN 3
Physical exam with severe indurated erythema and tenderness of perineum and perianal region. No gibran pus appreciated. Patient w/ allergies to ampicillin and penicillin, and had reaction to Bactrim several weeks ago, and clindamycin overnight.  -Cefepime 2g IV q12hrs (w/ ID approval) for gram-negative, MSSA, and pseudomonal coverage  -Vancomycin 1g IV q8hrs   Plan:  -C/w vancomycin 1g q8hrs (6/16- ), cefepime 2g q12 (6/16- )  -Pain control    #Perineal abscess  CT pelvis w/ 3cm area of infiltration of subcutaneous fat in the inferior left buttock consistent with superficial phlegmonous change or perhaps early abscess.   -General surgery consulted, plan for evaluation under sedation with possible drainage  -Cefepime for gram-neg coverage (6/16- ), flagyl for anaerobic coverage (6/17- ) Physical exam with severe indurated erythema and tenderness of perineum and perianal region. No gibran pus appreciated. Patient w/ allergies to ampicillin and penicillin, and had reaction to Bactrim several weeks ago, and clindamycin overnight.  -Cefepime 2g IV q12hrs (w/ ID approval), transitioned to ceftriaxone based on sensitivities  -Vancomycin 1g IV q8hrs, discontinued after culture sensitivities resulted  -Started on flagyl for gram-negative coverage  Plan:  -C/w vancomycin 1g q8hrs (6/16- ), cefepime 2g q12 (6/16- )  -Pain control    #Perineal abscess  CT pelvis w/ 3cm area of infiltration of subcutaneous fat in the inferior left buttock consistent with superficial phlegmonous change or perhaps early abscess.   -General surgery consulted, plan for evaluation under sedation with possible drainage  -Cefepime for gram-neg coverage (6/16- ), flagyl for anaerobic coverage (6/17- )

## 2020-06-19 NOTE — PROGRESS NOTE ADULT - ASSESSMENT
40F with PMH of poorly controlled diabetes, HTN, ventral hernia c/b infected mesh (2018), C -section c/b haemoperitoneum (2019), admitted to medicine for vulvar candidiasis and cellulitis with severe hyperglycaemia, general surgery consulted to r/o buttocks abscess s/p EUA 6/18     Plan  Continue IV antibiotics  Trend WCC  Serial buttocks and perineal exams  ISS, serum glucose control  Rest of care as per primary team  Surgery team 4C will continue to follow  Discussed with attending and chief resident

## 2020-06-20 ENCOUNTER — TRANSCRIPTION ENCOUNTER (OUTPATIENT)
Age: 40
End: 2020-06-20

## 2020-06-20 VITALS
SYSTOLIC BLOOD PRESSURE: 156 MMHG | HEART RATE: 91 BPM | DIASTOLIC BLOOD PRESSURE: 96 MMHG | OXYGEN SATURATION: 96 % | TEMPERATURE: 99 F | RESPIRATION RATE: 18 BRPM

## 2020-06-20 DIAGNOSIS — E11.65 TYPE 2 DIABETES MELLITUS WITH HYPERGLYCEMIA: ICD-10-CM

## 2020-06-20 DIAGNOSIS — E66.01 MORBID (SEVERE) OBESITY DUE TO EXCESS CALORIES: ICD-10-CM

## 2020-06-20 LAB
-  AMPICILLIN/SULBACTAM: SIGNIFICANT CHANGE UP
-  AMPICILLIN: SIGNIFICANT CHANGE UP
-  CEFAZOLIN: SIGNIFICANT CHANGE UP
-  CEFAZOLIN: SIGNIFICANT CHANGE UP
-  CEFTRIAXONE: SIGNIFICANT CHANGE UP
-  CIPROFLOXACIN: SIGNIFICANT CHANGE UP
-  CLINDAMYCIN: SIGNIFICANT CHANGE UP
-  ERYTHROMYCIN: SIGNIFICANT CHANGE UP
-  GENTAMICIN: SIGNIFICANT CHANGE UP
-  LINEZOLID: SIGNIFICANT CHANGE UP
-  OXACILLIN: SIGNIFICANT CHANGE UP
-  PENICILLIN: SIGNIFICANT CHANGE UP
-  PIPERACILLIN/TAZOBACTAM: SIGNIFICANT CHANGE UP
-  RIFAMPIN: SIGNIFICANT CHANGE UP
-  TOBRAMYCIN: SIGNIFICANT CHANGE UP
-  TRIMETHOPRIM/SULFAMETHOXAZOLE: SIGNIFICANT CHANGE UP
-  TRIMETHOPRIM/SULFAMETHOXAZOLE: SIGNIFICANT CHANGE UP
-  VANCOMYCIN: SIGNIFICANT CHANGE UP
ALBUMIN SERPL ELPH-MCNC: 2.5 G/DL — LOW (ref 3.3–5)
ALP SERPL-CCNC: 125 U/L — HIGH (ref 40–120)
ALT FLD-CCNC: 35 U/L — SIGNIFICANT CHANGE UP (ref 10–45)
ANION GAP SERPL CALC-SCNC: 12 MMOL/L — SIGNIFICANT CHANGE UP (ref 5–17)
AST SERPL-CCNC: 50 U/L — HIGH (ref 10–40)
BILIRUB SERPL-MCNC: 0.3 MG/DL — SIGNIFICANT CHANGE UP (ref 0.2–1.2)
BUN SERPL-MCNC: 4 MG/DL — LOW (ref 7–23)
CALCIUM SERPL-MCNC: 8.3 MG/DL — LOW (ref 8.4–10.5)
CHLORIDE SERPL-SCNC: 99 MMOL/L — SIGNIFICANT CHANGE UP (ref 96–108)
CO2 SERPL-SCNC: 25 MMOL/L — SIGNIFICANT CHANGE UP (ref 22–31)
CREAT SERPL-MCNC: 0.54 MG/DL — SIGNIFICANT CHANGE UP (ref 0.5–1.3)
GLUCOSE BLDC GLUCOMTR-MCNC: 220 MG/DL — HIGH (ref 70–99)
GLUCOSE BLDC GLUCOMTR-MCNC: 247 MG/DL — HIGH (ref 70–99)
GLUCOSE SERPL-MCNC: 232 MG/DL — HIGH (ref 70–99)
HCT VFR BLD CALC: 41.5 % — SIGNIFICANT CHANGE UP (ref 34.5–45)
HGB BLD-MCNC: 12.7 G/DL — SIGNIFICANT CHANGE UP (ref 11.5–15.5)
MAGNESIUM SERPL-MCNC: 1.8 MG/DL — SIGNIFICANT CHANGE UP (ref 1.6–2.6)
MCHC RBC-ENTMCNC: 26.1 PG — LOW (ref 27–34)
MCHC RBC-ENTMCNC: 30.6 GM/DL — LOW (ref 32–36)
MCV RBC AUTO: 85.4 FL — SIGNIFICANT CHANGE UP (ref 80–100)
METHOD TYPE: SIGNIFICANT CHANGE UP
METHOD TYPE: SIGNIFICANT CHANGE UP
NRBC # BLD: 0 /100 WBCS — SIGNIFICANT CHANGE UP (ref 0–0)
PHOSPHATE SERPL-MCNC: 2.8 MG/DL — SIGNIFICANT CHANGE UP (ref 2.5–4.5)
PLATELET # BLD AUTO: 276 K/UL — SIGNIFICANT CHANGE UP (ref 150–400)
POTASSIUM SERPL-MCNC: 3.6 MMOL/L — SIGNIFICANT CHANGE UP (ref 3.5–5.3)
POTASSIUM SERPL-SCNC: 3.6 MMOL/L — SIGNIFICANT CHANGE UP (ref 3.5–5.3)
PROT SERPL-MCNC: 6 G/DL — SIGNIFICANT CHANGE UP (ref 6–8.3)
RBC # BLD: 4.86 M/UL — SIGNIFICANT CHANGE UP (ref 3.8–5.2)
RBC # FLD: 14.8 % — HIGH (ref 10.3–14.5)
SODIUM SERPL-SCNC: 136 MMOL/L — SIGNIFICANT CHANGE UP (ref 135–145)
WBC # BLD: 6.49 K/UL — SIGNIFICANT CHANGE UP (ref 3.8–10.5)
WBC # FLD AUTO: 6.49 K/UL — SIGNIFICANT CHANGE UP (ref 3.8–10.5)

## 2020-06-20 PROCEDURE — 85027 COMPLETE CBC AUTOMATED: CPT

## 2020-06-20 PROCEDURE — 87075 CULTR BACTERIA EXCEPT BLOOD: CPT

## 2020-06-20 PROCEDURE — 99285 EMERGENCY DEPT VISIT HI MDM: CPT | Mod: 25

## 2020-06-20 PROCEDURE — 87591 N.GONORRHOEAE DNA AMP PROB: CPT

## 2020-06-20 PROCEDURE — 87801 DETECT AGNT MULT DNA AMPLI: CPT

## 2020-06-20 PROCEDURE — 87086 URINE CULTURE/COLONY COUNT: CPT

## 2020-06-20 PROCEDURE — 93970 EXTREMITY STUDY: CPT

## 2020-06-20 PROCEDURE — 83605 ASSAY OF LACTIC ACID: CPT

## 2020-06-20 PROCEDURE — 84443 ASSAY THYROID STIM HORMONE: CPT

## 2020-06-20 PROCEDURE — 86904 BLOOD TYPING PATIENT SERUM: CPT

## 2020-06-20 PROCEDURE — 87070 CULTURE OTHR SPECIMN AEROBIC: CPT

## 2020-06-20 PROCEDURE — 87635 SARS-COV-2 COVID-19 AMP PRB: CPT

## 2020-06-20 PROCEDURE — 87491 CHLMYD TRACH DNA AMP PROBE: CPT

## 2020-06-20 PROCEDURE — 86880 COOMBS TEST DIRECT: CPT

## 2020-06-20 PROCEDURE — 99239 HOSP IP/OBS DSCHRG MGMT >30: CPT | Mod: GC

## 2020-06-20 PROCEDURE — 86901 BLOOD TYPING SEROLOGIC RH(D): CPT

## 2020-06-20 PROCEDURE — 96361 HYDRATE IV INFUSION ADD-ON: CPT

## 2020-06-20 PROCEDURE — 86850 RBC ANTIBODY SCREEN: CPT

## 2020-06-20 PROCEDURE — 80061 LIPID PANEL: CPT

## 2020-06-20 PROCEDURE — 96376 TX/PRO/DX INJ SAME DRUG ADON: CPT

## 2020-06-20 PROCEDURE — 80202 ASSAY OF VANCOMYCIN: CPT

## 2020-06-20 PROCEDURE — 83036 HEMOGLOBIN GLYCOSYLATED A1C: CPT

## 2020-06-20 PROCEDURE — 72192 CT PELVIS W/O DYE: CPT

## 2020-06-20 PROCEDURE — 86870 RBC ANTIBODY IDENTIFICATION: CPT

## 2020-06-20 PROCEDURE — 87184 SC STD DISK METHOD PER PLATE: CPT

## 2020-06-20 PROCEDURE — 87563 M. GENITALIUM AMP PROBE: CPT

## 2020-06-20 PROCEDURE — 87798 DETECT AGENT NOS DNA AMP: CPT

## 2020-06-20 PROCEDURE — 36415 COLL VENOUS BLD VENIPUNCTURE: CPT

## 2020-06-20 PROCEDURE — 70450 CT HEAD/BRAIN W/O DYE: CPT

## 2020-06-20 PROCEDURE — 82306 VITAMIN D 25 HYDROXY: CPT

## 2020-06-20 PROCEDURE — 84100 ASSAY OF PHOSPHORUS: CPT

## 2020-06-20 PROCEDURE — 83735 ASSAY OF MAGNESIUM: CPT

## 2020-06-20 PROCEDURE — 85025 COMPLETE CBC W/AUTO DIFF WBC: CPT

## 2020-06-20 PROCEDURE — 82962 GLUCOSE BLOOD TEST: CPT

## 2020-06-20 PROCEDURE — 80307 DRUG TEST PRSMV CHEM ANLYZR: CPT

## 2020-06-20 PROCEDURE — 80048 BASIC METABOLIC PNL TOTAL CA: CPT

## 2020-06-20 PROCEDURE — 73110 X-RAY EXAM OF WRIST: CPT

## 2020-06-20 PROCEDURE — 85730 THROMBOPLASTIN TIME PARTIAL: CPT

## 2020-06-20 PROCEDURE — 87205 SMEAR GRAM STAIN: CPT

## 2020-06-20 PROCEDURE — 85610 PROTHROMBIN TIME: CPT

## 2020-06-20 PROCEDURE — 96375 TX/PRO/DX INJ NEW DRUG ADDON: CPT

## 2020-06-20 PROCEDURE — 84702 CHORIONIC GONADOTROPIN TEST: CPT

## 2020-06-20 PROCEDURE — 82803 BLOOD GASES ANY COMBINATION: CPT

## 2020-06-20 PROCEDURE — 96365 THER/PROPH/DIAG IV INF INIT: CPT

## 2020-06-20 PROCEDURE — 87661 TRICHOMONAS VAGINALIS AMPLIF: CPT

## 2020-06-20 PROCEDURE — 81001 URINALYSIS AUTO W/SCOPE: CPT

## 2020-06-20 PROCEDURE — 82010 KETONE BODYS QUAN: CPT

## 2020-06-20 PROCEDURE — 80053 COMPREHEN METABOLIC PANEL: CPT

## 2020-06-20 PROCEDURE — 87040 BLOOD CULTURE FOR BACTERIA: CPT

## 2020-06-20 PROCEDURE — 87186 SC STD MICRODIL/AGAR DIL: CPT

## 2020-06-20 RX ORDER — METRONIDAZOLE 500 MG
1 TABLET ORAL
Qty: 39 | Refills: 0
Start: 2020-06-20 | End: 2020-07-02

## 2020-06-20 RX ORDER — INSULIN GLARGINE 100 [IU]/ML
60 INJECTION, SOLUTION SUBCUTANEOUS
Qty: 1 | Refills: 0
Start: 2020-06-20 | End: 2020-07-19

## 2020-06-20 RX ORDER — INSULIN LISPRO 100/ML
25 VIAL (ML) SUBCUTANEOUS
Qty: 1 | Refills: 0
Start: 2020-06-20 | End: 2020-07-19

## 2020-06-20 RX ORDER — CEFDINIR 250 MG/5ML
1 POWDER, FOR SUSPENSION ORAL
Qty: 20 | Refills: 0
Start: 2020-06-20 | End: 2020-06-29

## 2020-06-20 RX ORDER — CEFPODOXIME PROXETIL 100 MG
2 TABLET ORAL
Qty: 40 | Refills: 0
Start: 2020-06-20 | End: 2020-06-29

## 2020-06-20 RX ORDER — ISOPROPYL ALCOHOL, BENZOCAINE .7; .06 ML/ML; ML/ML
1 SWAB TOPICAL
Qty: 100 | Refills: 1
Start: 2020-06-20 | End: 2020-08-08

## 2020-06-20 RX ORDER — LOSARTAN POTASSIUM 100 MG/1
1 TABLET, FILM COATED ORAL
Qty: 30 | Refills: 0
Start: 2020-06-20 | End: 2020-07-19

## 2020-06-20 RX ORDER — NYSTATIN CREAM 100000 [USP'U]/G
1 CREAM TOPICAL
Qty: 90 | Refills: 0
Start: 2020-06-20 | End: 2020-07-19

## 2020-06-20 RX ORDER — ATORVASTATIN CALCIUM 80 MG/1
1 TABLET, FILM COATED ORAL
Qty: 30 | Refills: 0
Start: 2020-06-20 | End: 2020-07-19

## 2020-06-20 RX ORDER — FLUCONAZOLE 150 MG/1
1 TABLET ORAL
Qty: 26 | Refills: 0
Start: 2020-06-20 | End: 2020-12-16

## 2020-06-20 RX ORDER — DIPHENHYDRAMINE HCL 50 MG
1 CAPSULE ORAL
Qty: 28 | Refills: 0
Start: 2020-06-20 | End: 2020-06-26

## 2020-06-20 RX ADMIN — Medication 25 MILLIGRAM(S): at 06:20

## 2020-06-20 RX ADMIN — Medication 25 UNIT(S): at 12:55

## 2020-06-20 RX ADMIN — Medication 25 UNIT(S): at 07:53

## 2020-06-20 RX ADMIN — ENOXAPARIN SODIUM 40 MILLIGRAM(S): 100 INJECTION SUBCUTANEOUS at 06:20

## 2020-06-20 RX ADMIN — ERGOCALCIFEROL 50000 UNIT(S): 1.25 CAPSULE ORAL at 12:56

## 2020-06-20 RX ADMIN — Medication 4: at 12:55

## 2020-06-20 RX ADMIN — Medication 4: at 07:52

## 2020-06-20 RX ADMIN — Medication 500 MILLIGRAM(S): at 06:20

## 2020-06-20 RX ADMIN — HYDROMORPHONE HYDROCHLORIDE 2 MILLIGRAM(S): 2 INJECTION INTRAMUSCULAR; INTRAVENOUS; SUBCUTANEOUS at 06:21

## 2020-06-20 RX ADMIN — LOSARTAN POTASSIUM 25 MILLIGRAM(S): 100 TABLET, FILM COATED ORAL at 06:20

## 2020-06-20 RX ADMIN — HYDROMORPHONE HYDROCHLORIDE 2 MILLIGRAM(S): 2 INJECTION INTRAMUSCULAR; INTRAVENOUS; SUBCUTANEOUS at 11:50

## 2020-06-20 RX ADMIN — NYSTATIN CREAM 1 APPLICATION(S): 100000 CREAM TOPICAL at 06:21

## 2020-06-20 NOTE — PROGRESS NOTE ADULT - REASON FOR ADMISSION
vaginal candidiasis

## 2020-06-20 NOTE — PROGRESS NOTE ADULT - SUBJECTIVE AND OBJECTIVE BOX
Patient is a 40y old  Female who presents with a chief complaint of vaginal candidiasis (19 Jun 2020 13:41)      INTERVAL HPI/OVERNIGHT EVENTS:    Pt. seen and examined this morning  Pt. c/o perianal/buttock pain, well-controlled w/ current regimen  No F/C    Review of Systems: 12 point review of systems otherwise negative    MEDICATIONS  (STANDING):  atorvastatin 40 milliGRAM(s) Oral at bedtime  BUpivacaine liposome 1.3% Injectable (no eMAR) 20 milliLiter(s) Local Injection once  cefTRIAXone   IVPB 1000 milliGRAM(s) IV Intermittent every 24 hours  dextrose 5%. 1000 milliLiter(s) (50 mL/Hr) IV Continuous <Continuous>  dextrose 50% Injectable 12.5 Gram(s) IV Push once  dextrose 50% Injectable 25 Gram(s) IV Push once  dextrose 50% Injectable 25 Gram(s) IV Push once  enoxaparin Injectable 40 milliGRAM(s) SubCutaneous every 12 hours  fluconAZOLE   Tablet 150 milliGRAM(s) Oral every 72 hours  insulin glargine Injectable (LANTUS) 60 Unit(s) SubCutaneous at bedtime  insulin lispro (HumaLOG) corrective regimen sliding scale   SubCutaneous Before meals and at bedtime  insulin lispro Injectable (HumaLOG) 25 Unit(s) SubCutaneous three times a day before meals  losartan 25 milliGRAM(s) Oral daily  magnesium sulfate  IVPB 4 Gram(s) IV Intermittent once  metroNIDAZOLE    Tablet 500 milliGRAM(s) Oral every 8 hours  nystatin Powder 1 Application(s) Topical every 8 hours    MEDICATIONS  (PRN):  calamine/zinc oxide Lotion 1 Application(s) Topical two times a day PRN Rash and/or Itching  dextrose 40% Gel 15 Gram(s) Oral once PRN Blood Glucose LESS THAN 70 milliGRAM(s)/deciliter  glucagon  Injectable 1 milliGRAM(s) IntraMuscular once PRN Glucose LESS THAN 70 milligrams/deciliter  HYDROmorphone   Tablet 2 milliGRAM(s) Oral every 4 hours PRN Breakthrough  hydrOXYzine hydrochloride 25 milliGRAM(s) Oral every 4 hours PRN Itching  lidocaine 5% Ointment 1 Application(s) Topical four times a day PRN Pain      Allergies    amoxicillin (Unknown)  clindamycin (Pruritus)  iodine containing compounds (Hives; Anaphylaxis)  penicillin (Hives)  shellfish. (Hives; Anaphylaxis)    Intolerances    Bactrim I.V. (Rash (Mod to Severe))        Vital Signs Last 24 Hrs  T(C): 37.1 (20 Jun 2020 05:48), Max: 37.1 (20 Jun 2020 05:48)  T(F): 98.7 (20 Jun 2020 05:48), Max: 98.7 (20 Jun 2020 05:48)  HR: 91 (20 Jun 2020 05:48) (91 - 101)  BP: 156/96 (20 Jun 2020 05:48) (156/96 - 159/98)  BP(mean): --  RR: 18 (20 Jun 2020 05:48) (18 - 18)  SpO2: 96% (20 Jun 2020 05:48) (96% - 96%)  CAPILLARY BLOOD GLUCOSE      POCT Blood Glucose.: 247 mg/dL (20 Jun 2020 12:37)  POCT Blood Glucose.: 220 mg/dL (20 Jun 2020 07:50)  POCT Blood Glucose.: 250 mg/dL (19 Jun 2020 21:48)  POCT Blood Glucose.: 296 mg/dL (19 Jun 2020 18:19)        Physical Exam:  (this morning)  Daily     Daily   General: non-toxic and comfortable-appearing in NAD  HEENT:  MMM  Abdomen:  morbidly obese  Skin:  WWP  Neuro:  AAOx3    LABS:                        12.7   6.49  )-----------( 276      ( 20 Jun 2020 07:07 )             41.5     06-20    136  |  99  |  4<L>  ----------------------------<  232<H>  3.6   |  25  |  0.54    Ca    8.3<L>      20 Jun 2020 07:07  Phos  2.8     06-20  Mg     1.8     06-20    TPro  6.0  /  Alb  2.5<L>  /  TBili  0.3  /  DBili  x   /  AST  50<H>  /  ALT  35  /  AlkPhos  125<H>  06-20            RADIOLOGY & ADDITIONAL TESTS:    ---------------------------------------------------------------------------  I personally reviewed: [  ]EKG   [  ]CXR    [  ] CT    [  ]Other  ---------------------------------------------------------------------------  PLEASE CHECK WHEN PRESENT:     [  ]Heart Failure     [  ] Acute     [  ] Acute on Chronic     [  ] Chronic  -------------------------------------------------------------------     [  ]Diastolic [HFpEF]     [  ]Systolic [HFrEF]     [  ]Combined [HFpEF & HFrEF]     [  ]Other:  -------------------------------------------------------------------  [  ]LORA     [  ]ATN     [  ]Reneal Medullary Necrosis     [  ]Renal Cortical Necrosis     [  ]Other Pathological Lesions:    [  ]CKD 1  [  ]CKD 2  [  ]CKD 3  [  ]CKD 4  [  ]CKD 5  [  ]Other  -------------------------------------------------------------------  [  ]Other/Unspecified:    --------------------------------------------------------------------    Abdominal Nutritional Status  [  ]Malnutrition: See Nutrition Note  [  ]Cachexia  [  ]Other:   [  ]Supplement Ordered:  [  ]Morbid Obesity (BMI >=40]

## 2020-06-20 NOTE — DISCHARGE NOTE NURSING/CASE MANAGEMENT/SOCIAL WORK - NSDCFUADDAPPT_GEN_ALL_CORE_FT
You have an appointment with our endocrinology office on 6/26/2020 at 11:20am.    Wound care:   - apply nystatin powder   - keep region dry and clean   - can apply 5% topical lidocaine to affected area for pain

## 2020-06-20 NOTE — PROGRESS NOTE ADULT - PROBLEM SELECTOR PLAN 1
severe sepsis POA; [facility] recs, s/p EUA 6/18; cont. cefpodoxime + Flagyl, f/u cultures; cont. wound care, pain control

## 2020-06-20 NOTE — DISCHARGE NOTE NURSING/CASE MANAGEMENT/SOCIAL WORK - PATIENT PORTAL LINK FT
You can access the FollowMyHealth Patient Portal offered by [facility] by registering at the following website: [email or web address]. By joining [facility]’s FollowMyHealth portal, you will also be able to view your health information using other applications (apps) compatible with our system.

## 2020-06-20 NOTE — PROGRESS NOTE ADULT - ATTENDING COMMENTS
Patient seen and examined with surgical staff. Due to extreme tenderness, unable to assess for underlying abscess or soft tissue damage in setting of potential uncontrolled infection. Will proceed with EUA. Discussed risks and benefits with patient including death, disability, blood clots, need for additonal procedures, bleeding, and other issues. She does wish to proceed. Patient on Lovenox prophylaxis and will have SCD's.
Pt seen on rounds this afternoon.  Pain has decreased somewhat since her surgery.  Glucoses also improved, and are now in the high-100 range.    The pt is concerned about her LE edema, which is moderate in degree, and likely due to a combination of IV fluids and moderate hypoalbuminemia.  (The latter is likely nutritional, since she has been eating poorly for nearly 2 weeks)  Given the decrease in her AM fingerstick to 175 today after 60 units of Lantus last night, plus the anticipated further improvement as her infection comes under control, will not add an AM dose of Lantus.  Continue the premeal lispro at 25 units
Dispo: d/c home today
Patient seen and examined at bedside. Agree with exam, a/p as above with the following addendum/edits:     1) Severe sepsis 2/2 perineal cellulitis and UTI: c/w Vanc/Cefepime/Flagyl, possible phlegmon vs abscess, appreciate surgery recs, f/u urine cultures, will de-escalate abx after speciation, prior culture data reviewed  2) Vaginal candidiasis: c/w fluconazole, f/u GYN recs  3) Poorly controlled DM: A1C 12.9, received lantus twice last night yet FS still 350s, increase due to infection as well, appreciate endo input, ensure all meds are in NS  4) L sided weakness: check L wrist x-ray, LLE weak as well, check CT head  5) Morbid obesity
FS improved although did not eat in the AM as NPO for surgical exploration of buttocks abscess. Her sepsis has resolved and WBC continues to decrease. Urine culture growing E. coli, will c/w cefepime today to complete 3 day course. MRSA and strep growing in buttocks culture so c/w Vanc. Low suspicion for anaerobic infection currently so can d/c flagyl. Will f/u cultures obtained in OR.
Patient seen and examined at bedside. Agree with exam, a/p as above with the following addendum/edits:     Went to OR yesterday for I&D, no drainable abscess (may have opened up itself earlier) however some cultures were obtained. Prelim cultures stated MRSA but now showing MSSA, UTI w/ GNR, and now some GNR in cellulitis cultures. Will switch to ceftriaxone and flagyl, can likely d/c to PO 3rd gen cephalosporin for d/c tomorrow. FS improved significantly.

## 2020-06-21 LAB
-  CEFTRIAXONE: SIGNIFICANT CHANGE UP
-  CLINDAMYCIN: SIGNIFICANT CHANGE UP
-  ERYTHROMYCIN: SIGNIFICANT CHANGE UP
-  LEVOFLOXACIN: SIGNIFICANT CHANGE UP
-  PENICILLIN: SIGNIFICANT CHANGE UP
-  VANCOMYCIN: SIGNIFICANT CHANGE UP
CULTURE RESULTS: SIGNIFICANT CHANGE UP
METHOD TYPE: SIGNIFICANT CHANGE UP
METHOD TYPE: SIGNIFICANT CHANGE UP
ORGANISM # SPEC MICROSCOPIC CNT: SIGNIFICANT CHANGE UP
SPECIMEN SOURCE: SIGNIFICANT CHANGE UP

## 2020-06-24 DIAGNOSIS — E55.9 VITAMIN D DEFICIENCY, UNSPECIFIED: ICD-10-CM

## 2020-06-24 DIAGNOSIS — N39.0 URINARY TRACT INFECTION, SITE NOT SPECIFIED: ICD-10-CM

## 2020-06-24 DIAGNOSIS — B96.1 KLEBSIELLA PNEUMONIAE [K. PNEUMONIAE] AS THE CAUSE OF DISEASES CLASSIFIED ELSEWHERE: ICD-10-CM

## 2020-06-24 DIAGNOSIS — A41.9 SEPSIS, UNSPECIFIED ORGANISM: ICD-10-CM

## 2020-06-24 DIAGNOSIS — Z96.89 PRESENCE OF OTHER SPECIFIED FUNCTIONAL IMPLANTS: ICD-10-CM

## 2020-06-24 DIAGNOSIS — B95.61 METHICILLIN SUSCEPTIBLE STAPHYLOCOCCUS AUREUS INFECTION AS THE CAUSE OF DISEASES CLASSIFIED ELSEWHERE: ICD-10-CM

## 2020-06-24 DIAGNOSIS — J45.909 UNSPECIFIED ASTHMA, UNCOMPLICATED: ICD-10-CM

## 2020-06-24 DIAGNOSIS — Z11.59 ENCOUNTER FOR SCREENING FOR OTHER VIRAL DISEASES: ICD-10-CM

## 2020-06-24 DIAGNOSIS — E78.5 HYPERLIPIDEMIA, UNSPECIFIED: ICD-10-CM

## 2020-06-24 DIAGNOSIS — M79.89 OTHER SPECIFIED SOFT TISSUE DISORDERS: ICD-10-CM

## 2020-06-24 DIAGNOSIS — I10 ESSENTIAL (PRIMARY) HYPERTENSION: ICD-10-CM

## 2020-06-24 DIAGNOSIS — Z91.013 ALLERGY TO SEAFOOD: ICD-10-CM

## 2020-06-24 DIAGNOSIS — Z79.4 LONG TERM (CURRENT) USE OF INSULIN: ICD-10-CM

## 2020-06-24 DIAGNOSIS — Z88.0 ALLERGY STATUS TO PENICILLIN: ICD-10-CM

## 2020-06-24 DIAGNOSIS — Z87.891 PERSONAL HISTORY OF NICOTINE DEPENDENCE: ICD-10-CM

## 2020-06-24 DIAGNOSIS — G43.909 MIGRAINE, UNSPECIFIED, NOT INTRACTABLE, WITHOUT STATUS MIGRAINOSUS: ICD-10-CM

## 2020-06-24 DIAGNOSIS — E11.65 TYPE 2 DIABETES MELLITUS WITH HYPERGLYCEMIA: ICD-10-CM

## 2020-06-24 DIAGNOSIS — B37.3 CANDIDIASIS OF VULVA AND VAGINA: ICD-10-CM

## 2020-06-24 DIAGNOSIS — R80.9 PROTEINURIA, UNSPECIFIED: ICD-10-CM

## 2020-06-24 DIAGNOSIS — R74.0 NONSPECIFIC ELEVATION OF LEVELS OF TRANSAMINASE AND LACTIC ACID DEHYDROGENASE [LDH]: ICD-10-CM

## 2020-06-24 DIAGNOSIS — Z88.1 ALLERGY STATUS TO OTHER ANTIBIOTIC AGENTS STATUS: ICD-10-CM

## 2020-06-24 DIAGNOSIS — R53.1 WEAKNESS: ICD-10-CM

## 2020-06-24 DIAGNOSIS — E66.01 MORBID (SEVERE) OBESITY DUE TO EXCESS CALORIES: ICD-10-CM

## 2020-06-24 DIAGNOSIS — E87.1 HYPO-OSMOLALITY AND HYPONATREMIA: ICD-10-CM

## 2020-06-24 DIAGNOSIS — B95.1 STREPTOCOCCUS, GROUP B, AS THE CAUSE OF DISEASES CLASSIFIED ELSEWHERE: ICD-10-CM

## 2020-06-24 DIAGNOSIS — Z86.718 PERSONAL HISTORY OF OTHER VENOUS THROMBOSIS AND EMBOLISM: ICD-10-CM

## 2020-06-24 DIAGNOSIS — K61.0 ANAL ABSCESS: ICD-10-CM

## 2020-06-24 LAB
A VAGINAE DNA VAG QL NAA+PROBE: SIGNIFICANT CHANGE UP
BVAB2 DNA VAG QL NAA+PROBE: SIGNIFICANT CHANGE UP
C ALBICANS DNA VAG QL NAA+PROBE: POSITIVE
C GLABRATA DNA VAG QL NAA+PROBE: NEGATIVE — SIGNIFICANT CHANGE UP
M GENITALIUM DNA SPEC QL NAA+PROBE: NEGATIVE — SIGNIFICANT CHANGE UP
M HOMINIS DNA SPEC QL NAA+PROBE: NEGATIVE — SIGNIFICANT CHANGE UP
MEGA1 DNA VAG QL NAA+PROBE: SIGNIFICANT CHANGE UP
T VAGINALIS RRNA SPEC QL NAA+PROBE: NEGATIVE — SIGNIFICANT CHANGE UP
UREAPLASMA DNA SPEC QL NAA+PROBE: NEGATIVE — SIGNIFICANT CHANGE UP

## 2020-06-26 ENCOUNTER — RESULT CHARGE (OUTPATIENT)
Age: 40
End: 2020-06-26

## 2020-06-26 ENCOUNTER — APPOINTMENT (OUTPATIENT)
Dept: ENDOCRINOLOGY | Facility: CLINIC | Age: 40
End: 2020-06-26
Payer: MEDICAID

## 2020-06-26 VITALS
DIASTOLIC BLOOD PRESSURE: 114 MMHG | BODY MASS INDEX: 47.09 KG/M2 | HEIGHT: 66 IN | HEART RATE: 110 BPM | WEIGHT: 293 LBS | SYSTOLIC BLOOD PRESSURE: 199 MMHG

## 2020-06-26 LAB
GLUCOSE BLDC GLUCOMTR-MCNC: 423
HBA1C MFR BLD HPLC: >14

## 2020-06-26 PROCEDURE — 99204 OFFICE O/P NEW MOD 45 MIN: CPT | Mod: GC

## 2020-06-26 RX ORDER — INSULIN GLARGINE 100 [IU]/ML
100 INJECTION, SOLUTION SUBCUTANEOUS TWICE DAILY
Qty: 2 | Refills: 0 | Status: DISCONTINUED | COMMUNITY
End: 2020-06-26

## 2020-06-26 RX ORDER — INSULIN DEGLUDEC INJECTION 100 U/ML
100 INJECTION, SOLUTION SUBCUTANEOUS
Qty: 15 | Refills: 5 | Status: DISCONTINUED | COMMUNITY
Start: 2020-06-26 | End: 2020-06-26

## 2020-07-06 ENCOUNTER — APPOINTMENT (OUTPATIENT)
Dept: SURGERY | Facility: CLINIC | Age: 40
End: 2020-07-06
Payer: MEDICAID

## 2020-07-06 VITALS
DIASTOLIC BLOOD PRESSURE: 113 MMHG | BODY MASS INDEX: 47.09 KG/M2 | HEART RATE: 120 BPM | TEMPERATURE: 97.2 F | WEIGHT: 293 LBS | HEIGHT: 66 IN | OXYGEN SATURATION: 98 % | SYSTOLIC BLOOD PRESSURE: 177 MMHG

## 2020-07-06 DIAGNOSIS — Z87.09 PERSONAL HISTORY OF OTHER DISEASES OF THE RESPIRATORY SYSTEM: ICD-10-CM

## 2020-07-06 PROCEDURE — 99024 POSTOP FOLLOW-UP VISIT: CPT

## 2020-07-07 ENCOUNTER — APPOINTMENT (OUTPATIENT)
Dept: SURGERY | Facility: CLINIC | Age: 40
End: 2020-07-07
Payer: MEDICAID

## 2020-07-07 VITALS
WEIGHT: 293 LBS | HEART RATE: 121 BPM | TEMPERATURE: 97.5 F | BODY MASS INDEX: 47.09 KG/M2 | HEIGHT: 66 IN | DIASTOLIC BLOOD PRESSURE: 109 MMHG | SYSTOLIC BLOOD PRESSURE: 168 MMHG | OXYGEN SATURATION: 97 %

## 2020-07-07 PROBLEM — Z87.09 HISTORY OF ASTHMA: Status: RESOLVED | Noted: 2020-07-06 | Resolved: 2020-07-07

## 2020-07-07 PROCEDURE — 99214 OFFICE O/P EST MOD 30 MIN: CPT | Mod: 24

## 2020-07-07 NOTE — ASSESSMENT
[FreeTextEntry1] : 40 Year old female with uncontrolled diabetes, morbid obesity and infectious complications. abscess site is healing. No further treatment needed for this. She may significantly benefit from weight loss surgery. She should see infectious disease to manage ongoing diflucan. We discussed natural scar maturation and return to normal activity.

## 2020-07-07 NOTE — PHYSICAL EXAM
[JVD] : no jugular venous distention  [Normal Breath Sounds] : Normal breath sounds [Normal Thyroid] : the thyroid was normal [Normal Heart Sounds] : normal heart sounds [Abdomen Tenderness] : ~T ~M No abdominal tenderness [Abdominal Masses] : No abdominal masses [Tender] : was nontender [Enlarged] : not enlarged [Purpura] : no purpura  [Alert] : alert [Oriented to Person] : oriented to person [Oriented to Time] : oriented to time [Anxious] : anxious [Oriented to Place] : oriented to place [de-identified] : NAD. Kempt. Appropriate. Comfortable. Exam performed with female chaperone and Dr. Braden. [de-identified] : Pupils equal. No Scleral Icterus. NCAT. [Calm] : calm [de-identified] : Supple [de-identified] : Obese, but soft, non tender and non distended. There is abdominal pannus.  [de-identified] : The wound is healing well. There is no evidence of abscess. Some induration remains but improved.

## 2020-07-07 NOTE — HISTORY OF PRESENT ILLNESS
[de-identified] : This is a 40 year old female who was admitted to [facility] with severely uncontrolled diabetes and severe fungal infection with cellulitis of perineum, labia, buttock and perianal regions. She underwent glucose control and incision and drainage of small abscess in the area. She has been on antibiotics. She is working on her diabetes. In terms of the wound it is healing now. We discussed that she will likely die from diabetes and/or weight related complications if she does not take measures to correct this.

## 2020-07-07 NOTE — HISTORY OF PRESENT ILLNESS
[de-identified] : 40 yr old seen originally 4 yrs ago presents for reevaluation  for bariatric surgery. In  ensuing time she had urgent surgery for  her ventral hernia  which was complicated by mesh infection requiring secondary surgery. She continues to suffer from uncontrolled diabetes

## 2020-07-13 ENCOUNTER — APPOINTMENT (OUTPATIENT)
Dept: INFECTIOUS DISEASE | Facility: CLINIC | Age: 40
End: 2020-07-13
Payer: MEDICAID

## 2020-07-13 VITALS
BODY MASS INDEX: 46.81 KG/M2 | SYSTOLIC BLOOD PRESSURE: 119 MMHG | HEIGHT: 66 IN | DIASTOLIC BLOOD PRESSURE: 76 MMHG | WEIGHT: 291.25 LBS | HEART RATE: 103 BPM | TEMPERATURE: 98.3 F | OXYGEN SATURATION: 97 %

## 2020-07-13 PROCEDURE — 99214 OFFICE O/P EST MOD 30 MIN: CPT

## 2020-07-13 RX ORDER — FLUCONAZOLE 150 MG/1
150 TABLET ORAL
Qty: 2 | Refills: 0 | Status: COMPLETED | COMMUNITY
Start: 2020-04-30 | End: 2020-07-13

## 2020-07-13 NOTE — PHYSICAL EXAM
[General Appearance - Alert] : alert [General Appearance - In No Acute Distress] : in no acute distress [Outer Ear] : the ears and nose were normal in appearance [Sclera] : the sclera and conjunctiva were normal [Neck Appearance] : the appearance of the neck was normal [Respiration, Rhythm And Depth] : normal respiratory rhythm and effort [Heart Rate And Rhythm] : heart rate was normal and rhythm regular [Heart Sounds] : normal S1 and S2 [Bowel Sounds] : normal bowel sounds [Edema] : there was no peripheral edema [Abdomen Soft] : soft [No Palpable Adenopathy] : no palpable adenopathy [] : no rash [Musculoskeletal - Swelling] : no joint swelling [Motor Exam] : the motor exam was normal [Oriented To Time, Place, And Person] : oriented to person, place, and time [FreeTextEntry1] : + erythema and tenderness over bilateral lower buttocks extending to perinium; no fluctuance or active drainage

## 2020-07-13 NOTE — REVIEW OF SYSTEMS
[As Noted in HPI] : as noted in HPI [Negative] : Heme/Lymph [Dysuria] : no dysuria [Pelvic Pain] : no pelvic pain

## 2020-07-13 NOTE — HISTORY OF PRESENT ILLNESS
[FreeTextEntry1] : 40 year old female with morbid obesity, uncontrolled DM2, HTN, HLD, recent admission for vulvovaginal cellulitis and candidiasis with concomitant UTI.  She was evaluation by surgery for abscess of buttock, wound packed in OR.  Culture of pernimeum grew Prevotella and yeast.  Wound cultures grew MSSA, klebsiella, S. anginosus. Patient has PCN allergy. She was treated with cefdinir, metronidazole and fluconazole. She completed antibiotics several days ago.  She continues to use nystatin to affected areas. Patient reports worsening symptoms since stopping antibiotics.  She reports the skin is raw and cracked. No fever.

## 2020-07-17 ENCOUNTER — APPOINTMENT (OUTPATIENT)
Dept: ENDOCRINOLOGY | Facility: CLINIC | Age: 40
End: 2020-07-17

## 2020-07-20 ENCOUNTER — APPOINTMENT (OUTPATIENT)
Dept: INTERNAL MEDICINE | Facility: CLINIC | Age: 40
End: 2020-07-20

## 2020-07-22 ENCOUNTER — APPOINTMENT (OUTPATIENT)
Dept: INFECTIOUS DISEASE | Facility: CLINIC | Age: 40
End: 2020-07-22

## 2020-08-17 ENCOUNTER — APPOINTMENT (OUTPATIENT)
Dept: INFECTIOUS DISEASE | Facility: CLINIC | Age: 40
End: 2020-08-17
Payer: MEDICAID

## 2020-08-17 VITALS
HEART RATE: 107 BPM | HEIGHT: 66 IN | WEIGHT: 289.13 LBS | DIASTOLIC BLOOD PRESSURE: 120 MMHG | OXYGEN SATURATION: 97 % | SYSTOLIC BLOOD PRESSURE: 171 MMHG | BODY MASS INDEX: 46.47 KG/M2 | TEMPERATURE: 99.2 F

## 2020-08-17 DIAGNOSIS — L03.818 CELLULITIS OF OTHER SITES: ICD-10-CM

## 2020-08-17 PROCEDURE — 99214 OFFICE O/P EST MOD 30 MIN: CPT

## 2020-08-17 RX ORDER — DOXYCYCLINE HYCLATE 100 MG/1
100 TABLET ORAL TWICE DAILY
Qty: 14 | Refills: 0 | Status: COMPLETED | COMMUNITY
Start: 2020-07-13 | End: 2020-08-17

## 2020-08-17 RX ORDER — CEFPODOXIME PROXETIL 200 MG/1
200 TABLET, FILM COATED ORAL
Qty: 28 | Refills: 0 | Status: COMPLETED | COMMUNITY
Start: 2020-07-13 | End: 2020-08-17

## 2020-08-17 RX ORDER — CEFPODOXIME PROXETIL 200 MG/1
200 TABLET, FILM COATED ORAL
Qty: 28 | Refills: 0 | Status: COMPLETED | COMMUNITY
Start: 2020-07-15 | End: 2020-08-17

## 2020-08-17 RX ORDER — CEPHALEXIN 500 MG/1
500 TABLET ORAL EVERY 6 HOURS
Qty: 28 | Refills: 0 | Status: COMPLETED | COMMUNITY
Start: 2020-07-24 | End: 2020-08-17

## 2020-08-17 NOTE — HISTORY OF PRESENT ILLNESS
[FreeTextEntry1] : 40 year old female with morbid obesity, uncontrolled DM2, HTN, HLD, recent admission for vulvovaginal cellulitis and candidiasis with concomitant UTI.  She was treated with cefdinir, metronidazole and fluconazole, completed 7/24.  She then reported lesion and drainage from left nipple treated with cephalexin. She continued to have candidiasis in groin refractory to nystatin.  On 8/14 she started clotrimazole external cream BID.  She reports that nipple lesions burst and is now improved. She continues to have significant irritation in groin.

## 2020-08-17 NOTE — PHYSICAL EXAM
[Outer Ear] : the ears and nose were normal in appearance [Sclera] : the sclera and conjunctiva were normal [Neck Appearance] : the appearance of the neck was normal [] : no respiratory distress [Heart Sounds] : normal S1 and S2 [Heart Rate And Rhythm] : heart rate was normal and rhythm regular [Edema] : there was no peripheral edema [Bowel Sounds] : normal bowel sounds [Abdomen Soft] : soft [Musculoskeletal - Swelling] : no joint swelling [Motor Exam] : the motor exam was normal [No Focal Deficits] : no focal deficits [Oriented To Time, Place, And Person] : oriented to person, place, and time [FreeTextEntry1] : + palpable axillary adenopathy on left, no redness or drainage

## 2020-09-16 ENCOUNTER — APPOINTMENT (OUTPATIENT)
Dept: ENDOCRINOLOGY | Facility: CLINIC | Age: 40
End: 2020-09-16

## 2020-09-30 ENCOUNTER — LABORATORY RESULT (OUTPATIENT)
Age: 40
End: 2020-09-30

## 2020-09-30 ENCOUNTER — APPOINTMENT (OUTPATIENT)
Dept: INFECTIOUS DISEASE | Facility: CLINIC | Age: 40
End: 2020-09-30
Payer: MEDICAID

## 2020-09-30 VITALS
BODY MASS INDEX: 45.22 KG/M2 | OXYGEN SATURATION: 99 % | WEIGHT: 281.38 LBS | SYSTOLIC BLOOD PRESSURE: 155 MMHG | HEIGHT: 66 IN | DIASTOLIC BLOOD PRESSURE: 98 MMHG | HEART RATE: 87 BPM | TEMPERATURE: 97.9 F

## 2020-09-30 DIAGNOSIS — B49 UNSPECIFIED MYCOSIS: ICD-10-CM

## 2020-09-30 PROCEDURE — 99214 OFFICE O/P EST MOD 30 MIN: CPT

## 2020-09-30 NOTE — HISTORY OF PRESENT ILLNESS
[FreeTextEntry1] : 40 year old female with morbid obesity and uncontrolled diabetes here for follow up.  Reports the rash in the groin got worse last week. Called this office and was given fluconazole 200 mg PO daily.  She reports it helped a little but she still has a burning rash in her bilateral groin and on her labia majora.  She has not had any fevers.  She also notes she is having vaginal discharge that looks like "sheets of white" since last week.  Reports burning on urination and fullness. She also feels like she has an infection "inside of her" and this is trying to "burst it's way out through her abdomen".  She has a history of ventral hernia repair 2 years ago that was complicated by infection.

## 2020-10-02 VITALS — WEIGHT: 281.38 LBS | HEIGHT: 66 IN | BODY MASS INDEX: 45.22 KG/M2

## 2020-10-02 LAB
ALBUMIN SERPL ELPH-MCNC: 4 G/DL
ALP BLD-CCNC: 210 U/L
ALT SERPL-CCNC: 63 U/L
ANION GAP SERPL CALC-SCNC: 20 MMOL/L
APPEARANCE: ABNORMAL
APTT BLD: 29.3 SEC
AST SERPL-CCNC: 72 U/L
BASOPHILS # BLD AUTO: 0.04 K/UL
BASOPHILS NFR BLD AUTO: 0.4 %
BILIRUB SERPL-MCNC: 0.3 MG/DL
BILIRUBIN URINE: NEGATIVE
BLOOD URINE: ABNORMAL
BUN SERPL-MCNC: 7 MG/DL
C TRACH RRNA SPEC QL NAA+PROBE: NOT DETECTED
CALCIUM SERPL-MCNC: 9.2 MG/DL
CHLORIDE SERPL-SCNC: 89 MMOL/L
CO2 SERPL-SCNC: 21 MMOL/L
COLOR: COLORLESS
CREAT SERPL-MCNC: 0.57 MG/DL
EOSINOPHIL # BLD AUTO: 0.1 K/UL
EOSINOPHIL NFR BLD AUTO: 1.1 %
ESTIMATED AVERAGE GLUCOSE: 384 MG/DL
GLUCOSE QUALITATIVE U: ABNORMAL
GLUCOSE SERPL-MCNC: 561 MG/DL
HBA1C MFR BLD HPLC: 15 %
HCT VFR BLD CALC: 48.6 %
HGB BLD-MCNC: 15.4 G/DL
IMM GRANULOCYTES NFR BLD AUTO: 0.2 %
INR PPP: 1.06 RATIO
KETONES URINE: NEGATIVE
LEUKOCYTE ESTERASE URINE: ABNORMAL
LYMPHOCYTES # BLD AUTO: 2.57 K/UL
LYMPHOCYTES NFR BLD AUTO: 27.1 %
MAN DIFF?: NORMAL
MCHC RBC-ENTMCNC: 26.7 PG
MCHC RBC-ENTMCNC: 31.7 GM/DL
MCV RBC AUTO: 84.4 FL
MONOCYTES # BLD AUTO: 0.56 K/UL
MONOCYTES NFR BLD AUTO: 5.9 %
MRSA SPEC QL CULT: NOT DETECTED
N GONORRHOEA RRNA SPEC QL NAA+PROBE: NOT DETECTED
NEUTROPHILS # BLD AUTO: 6.21 K/UL
NEUTROPHILS NFR BLD AUTO: 65.3 %
NITRITE URINE: NEGATIVE
PH URINE: 6
PLATELET # BLD AUTO: 321 K/UL
POTASSIUM SERPL-SCNC: 4.1 MMOL/L
PROT SERPL-MCNC: 7 G/DL
PROTEIN URINE: NORMAL
PT BLD: 12.5 SEC
RBC # BLD: 5.76 M/UL
RBC # FLD: 14.8 %
SODIUM SERPL-SCNC: 130 MMOL/L
SOURCE AMPLIFICATION: NORMAL
SPECIFIC GRAVITY URINE: 1.03
STAPH AUREUS (SA): DETECTED
UROBILINOGEN URINE: NORMAL
WBC # FLD AUTO: 9.5 K/UL

## 2020-10-07 DIAGNOSIS — Z87.440 PERSONAL HISTORY OF URINARY (TRACT) INFECTIONS: ICD-10-CM

## 2020-10-07 LAB — BACTERIA BLD CULT: NORMAL

## 2020-10-07 RX ORDER — FLUCONAZOLE 200 MG/1
200 TABLET ORAL
Qty: 10 | Refills: 0 | Status: COMPLETED | COMMUNITY
Start: 2020-07-13 | End: 2020-10-07

## 2020-10-07 RX ORDER — CLOTRIMAZOLE 10 MG/G
1 CREAM TOPICAL TWICE DAILY
Qty: 1 | Refills: 0 | Status: COMPLETED | COMMUNITY
Start: 2020-08-14 | End: 2020-10-07

## 2020-10-07 RX ORDER — FLUCONAZOLE 200 MG/1
200 TABLET ORAL
Qty: 10 | Refills: 0 | Status: COMPLETED | COMMUNITY
Start: 2020-09-24 | End: 2020-10-07

## 2020-10-07 RX ORDER — CEPHALEXIN 500 MG/1
500 TABLET ORAL EVERY 6 HOURS
Qty: 28 | Refills: 0 | Status: COMPLETED | COMMUNITY
Start: 2020-08-14 | End: 2020-10-07

## 2020-10-16 ENCOUNTER — APPOINTMENT (OUTPATIENT)
Dept: ENDOCRINOLOGY | Facility: CLINIC | Age: 40
End: 2020-10-16
Payer: MEDICAID

## 2020-10-16 VITALS
WEIGHT: 281 LBS | BODY MASS INDEX: 45.16 KG/M2 | TEMPERATURE: 97.4 F | SYSTOLIC BLOOD PRESSURE: 145 MMHG | OXYGEN SATURATION: 98 % | HEIGHT: 66 IN | HEART RATE: 105 BPM | DIASTOLIC BLOOD PRESSURE: 92 MMHG

## 2020-10-16 DIAGNOSIS — Z83.3 FAMILY HISTORY OF DIABETES MELLITUS: ICD-10-CM

## 2020-10-16 DIAGNOSIS — Z87.891 PERSONAL HISTORY OF NICOTINE DEPENDENCE: ICD-10-CM

## 2020-10-16 DIAGNOSIS — Z82.0 FAMILY HISTORY OF EPILEPSY AND OTHER DISEASES OF THE NERVOUS SYSTEM: ICD-10-CM

## 2020-10-16 DIAGNOSIS — Z82.49 FAMILY HISTORY OF ISCHEMIC HEART DISEASE AND OTHER DISEASES OF THE CIRCULATORY SYSTEM: ICD-10-CM

## 2020-10-16 PROCEDURE — 99205 OFFICE O/P NEW HI 60 MIN: CPT

## 2020-10-16 RX ORDER — DEXTROSE 4 G
4-6 TABLET,CHEWABLE ORAL
Qty: 3 | Refills: 5 | Status: ACTIVE | COMMUNITY
Start: 2020-10-16 | End: 1900-01-01

## 2020-10-16 RX ORDER — BLOOD SUGAR DIAGNOSTIC
STRIP MISCELLANEOUS
Qty: 1 | Refills: 2 | Status: ACTIVE | COMMUNITY
Start: 2020-10-16 | End: 1900-01-01

## 2020-10-16 RX ORDER — BLOOD-GLUCOSE METER
W/DEVICE EACH MISCELLANEOUS
Qty: 1 | Refills: 0 | Status: ACTIVE | COMMUNITY
Start: 2020-10-16 | End: 1900-01-01

## 2020-10-16 RX ORDER — DEXAMETHASONE 1 MG/1
1 TABLET ORAL
Qty: 1 | Refills: 0 | Status: DISCONTINUED | COMMUNITY
Start: 2020-06-26 | End: 2020-10-16

## 2020-10-16 NOTE — HISTORY OF PRESENT ILLNESS
[FreeTextEntry1] : Patient is a 39 yo woman uncontrolled type 2 diabetes, HTN and HLD\par \par Type 2 diabetes diagnosed in 2005 based on blood work. In 2007, she was started metformin 1000 mg BID.  In 2009, patient was pregnant and started on glyburide- did not require insulin for pregnancy.  States that when she lost weight, diabetes got worse.\par Check sugars 3-4 times a day\par Was on basal insulin (two samples)-75 units, bolus insulin 40-50 TID with meals.  Ran out of insulin over 1 week ago.  When she ran out of insulin, she took metformin once a day\par Sugars are always in the 200-300s daily\par Never had weight loss surgery\par Breakfast: skips, has some nausea\par Lunch: crackers, salads; white rice, long-grain rice with garlic butter + soy sauce; does not eat food\par Dinner: vegetables, Portuguese food with rice\par Has gretel lemon candy or cinnamon drops\par Does not like chocolate\par Drinks soda: used to drink 2 liters of Coke/Pepsi but now has cut back. Has water with lemon\par Dilated eye exam: has an appointment; left eye retinopathy\par LMP: September 24-October 4

## 2020-10-16 NOTE — REVIEW OF SYSTEMS
[Chest Pain] : chest pain [Dysphagia] : no dysphagia [Neck Pain] : no neck pain [Dysphonia] : no dysphonia [Nasal Congestion] : no nasal congestion [Shortness Of Breath] : no shortness of breath [Nausea] : nausea [Constipation] : no constipation [Heartburn] : heartburn [Abdominal Pain] : abdominal pain [Vomiting] : vomiting [Diarrhea] : no diarrhea [Gas/Bloating] : gas/bloating [Irregular Menses] : regular menses [Headaches] : no headaches [Tremors] : no tremors [Depression] : no depression [Suicidal Ideation] : no suicidal ideation [FreeTextEntry5] : has chronic chest pain but has no PCP

## 2020-10-16 NOTE — REASON FOR VISIT
[DM Type 2] : DM Type 2 [Initial Evaluation] : an initial evaluation [FreeTextEntry2] : Dr. Ross Kauffman and Dr. Adolfo Saucedo

## 2020-10-16 NOTE — PHYSICAL EXAM
[Normal Hearing] : hearing was normal [Thyroid Not Enlarged] : the thyroid was not enlarged [No Thyroid Nodules] : no palpable thyroid nodules [No Accessory Muscle Use] : no accessory muscle use [No Respiratory Distress] : no respiratory distress [Clear to Auscultation] : lungs were clear to auscultation bilaterally [Normal S1, S2] : normal S1 and S2 [Normal Rate] : heart rate was normal [Normal Bowel Sounds] : normal bowel sounds [Soft] : abdomen soft [Foot Ulcers] : no foot ulcers [Normal Gait] : normal gait [Right Foot Was Examined] : right foot ~C was examined [Left Foot Was Examined] : left foot ~C was examined [Normal] : normal [Swelling] : not swollen [Full ROM] : with full range of motion [Tenderness] : not tender [Erythema] : not erythematous [2+] : 2+ in the dorsalis pedis [#1 Diminished] : number 1 was normal [#2 Diminished] : number 2 was normal [#3 Diminished] : number 3 was normal [#4 Diminished] : number 4 was normal [#6 Diminished] : number 6 was normal [#7 Diminished] : number 7 was diminished [#5 Diminished] : number 5 was normal [#10 Diminished] : number 10 was normal [#8 Diminished] : number 8 was diminished [#9 Diminished] : number 9 was diminished [No Tremors] : no tremors [Normal Affect] : the affect was normal [No Motor Deficits] : the motor exam was normal [Normal Insight/Judgement] : insight and judgment were intact [Normal Mood] : the mood was normal [de-identified] : BMI 45.36

## 2020-10-16 NOTE — ASSESSMENT
[Long Term Vascular Complications] : long term vascular complications of diabetes [Diabetes Foot Care] : diabetes foot care [Carbohydrate Consistent Diet] : carbohydrate consistent diet [Importance of Diet and Exercise] : importance of diet and exercise to improve glycemic control, achieve weight loss and improve cardiovascular health [Hypoglycemia Management] : hypoglycemia management [Glucagon Use] : glucagon use [Exercise/Effect on Glucose] : exercise/effect on glucose [Self Monitoring of Blood Glucose] : self monitoring of blood glucose [Action and use of Insulin] : action and use of short and long-acting insulin [Insulin Self-Administration] : insulin self-administration [Injection Technique, Storage, Sharps Disposal] : injection technique, storage, and sharps disposal [Retinopathy Screening] : Patient was referred to ophthalmology for retinopathy screening [FreeTextEntry1] : Patient is a 41 yo woman with uncontrolled T2DM, BMI >45, HTN and HLD establishing endocrine care\par \par 1. Uncontrolled T2DM\par -risks of uncontrolled diabetes including micro and macrovascular complications (MI, stroke, death) were discussed\par -based on food recall she is eating very little as she has nausea and bloating, possible gastroparesis.  Denies eating fast food, has cut down on soda intake\par -ran out of all insulin over 1 week ago\par -patient requires insulin at this time and would benefit from U500 insulin.  Rx for U500 65 units TID was provided, spoke with pharmacy who confirms insurance will cover this medicine. Educated that this is a stronger insulin and to monitor for hypoglycemia\par -restart metformin 1000 mg BID\par -hypoglycemia education provided\par -offered nutrition counseling and she already has plans for metabolic surgery\par -discussed risks of pregnancy and to use contraception if sexually active\par -optimization for surgery requires A1c < 8-9%\par -reported chronic chest pain-go to ED if acute, needs cardiology and to establish primary care\par -Rx for insulin supplies provided\par -referral for ophthalmology provided\par -will check microalbumin at next visit\par \par 2. BMI 45\par -agree that metabolic surgery would be ideal\par -she has already established consultation\par -requires diabetes/A1c optimization\par -she was ruled out for Cushing's disease with dexamethasone suppression testing.  She suppressed to < 1.8\par \par 3. HLD\par -statin\par -will check lipid profile at next visit\par \par 4. HTN\par -BP elevated non-adherence with medication. Patient will re-establish care and has plans to see cardiology\par \par Patient with uncontrolled, long-standing diabetes with retinopathy and neuropathy on monofilament testing. Educated about need for improving glycemic control to target A1c of 7%.  Start u500, call with hypo/hyperglycemic excursions. Follow up in 2 months, sooner as needed

## 2020-10-16 NOTE — CONSULT LETTER
[Dear  ___] : Dear  [unfilled], [Consult Letter:] : I had the pleasure of evaluating your patient, [unfilled]. [Please see my note below.] : Please see my note below. [Sincerely,] : Sincerely, [Consult Closing:] : Thank you very much for allowing me to participate in the care of this patient.  If you have any questions, please do not hesitate to contact me. [FreeTextEntry3] : Nisha Joseph MD [DrFaith  ___] : Dr. PROCTOR

## 2020-10-19 ENCOUNTER — TRANSCRIPTION ENCOUNTER (OUTPATIENT)
Age: 40
End: 2020-10-19

## 2020-10-19 RX ORDER — INSULIN DEGLUDEC INJECTION 200 U/ML
200 INJECTION, SOLUTION SUBCUTANEOUS
Qty: 3 | Refills: 3 | Status: DISCONTINUED | COMMUNITY
Start: 2020-06-26 | End: 2020-10-19

## 2020-10-19 RX ORDER — INSULIN LISPRO 100 [IU]/ML
100 INJECTION, SOLUTION INTRAVENOUS; SUBCUTANEOUS
Qty: 3 | Refills: 4 | Status: DISCONTINUED | COMMUNITY
Start: 2020-06-26 | End: 2020-10-19

## 2020-10-20 ENCOUNTER — TRANSCRIPTION ENCOUNTER (OUTPATIENT)
Age: 40
End: 2020-10-20

## 2020-10-21 ENCOUNTER — TRANSCRIPTION ENCOUNTER (OUTPATIENT)
Age: 40
End: 2020-10-21

## 2020-10-26 ENCOUNTER — TRANSCRIPTION ENCOUNTER (OUTPATIENT)
Age: 40
End: 2020-10-26

## 2020-10-31 ENCOUNTER — INPATIENT (INPATIENT)
Facility: HOSPITAL | Age: 40
LOS: 3 days | Discharge: HOME | DRG: 638 | End: 2020-11-04
Attending: HOSPITALIST | Admitting: HOSPITALIST
Payer: COMMERCIAL

## 2020-10-31 VITALS
HEART RATE: 120 BPM | TEMPERATURE: 98 F | SYSTOLIC BLOOD PRESSURE: 184 MMHG | WEIGHT: 281.09 LBS | OXYGEN SATURATION: 98 % | RESPIRATION RATE: 20 BRPM | HEIGHT: 67 IN | DIASTOLIC BLOOD PRESSURE: 122 MMHG

## 2020-10-31 DIAGNOSIS — Z98.89 OTHER SPECIFIED POSTPROCEDURAL STATES: Chronic | ICD-10-CM

## 2020-10-31 DIAGNOSIS — Z98.890 OTHER SPECIFIED POSTPROCEDURAL STATES: Chronic | ICD-10-CM

## 2020-10-31 LAB
ALBUMIN SERPL ELPH-MCNC: 3.2 G/DL — LOW (ref 3.3–5)
ALP SERPL-CCNC: 136 U/L — HIGH (ref 40–120)
ALT FLD-CCNC: 37 U/L — SIGNIFICANT CHANGE UP (ref 10–45)
ANION GAP SERPL CALC-SCNC: 11 MMOL/L — SIGNIFICANT CHANGE UP (ref 5–17)
ANION GAP SERPL CALC-SCNC: 14 MMOL/L — SIGNIFICANT CHANGE UP (ref 5–17)
APPEARANCE UR: CLEAR — SIGNIFICANT CHANGE UP
AST SERPL-CCNC: 58 U/L — HIGH (ref 10–40)
B-OH-BUTYR SERPL-SCNC: 0.1 MMOL/L — SIGNIFICANT CHANGE UP
BASE EXCESS BLDV CALC-SCNC: -2.3 MMOL/L — SIGNIFICANT CHANGE UP
BASOPHILS # BLD AUTO: 0.02 K/UL — SIGNIFICANT CHANGE UP (ref 0–0.2)
BASOPHILS NFR BLD AUTO: 0.2 % — SIGNIFICANT CHANGE UP (ref 0–2)
BILIRUB SERPL-MCNC: 0.2 MG/DL — SIGNIFICANT CHANGE UP (ref 0.2–1.2)
BILIRUB UR-MCNC: NEGATIVE — SIGNIFICANT CHANGE UP
BUN SERPL-MCNC: 7 MG/DL — SIGNIFICANT CHANGE UP (ref 7–23)
BUN SERPL-MCNC: 8 MG/DL — SIGNIFICANT CHANGE UP (ref 7–23)
CALCIUM SERPL-MCNC: 8.4 MG/DL — SIGNIFICANT CHANGE UP (ref 8.4–10.5)
CALCIUM SERPL-MCNC: 8.8 MG/DL — SIGNIFICANT CHANGE UP (ref 8.4–10.5)
CHLORIDE SERPL-SCNC: 103 MMOL/L — SIGNIFICANT CHANGE UP (ref 96–108)
CHLORIDE SERPL-SCNC: 98 MMOL/L — SIGNIFICANT CHANGE UP (ref 96–108)
CO2 SERPL-SCNC: 21 MMOL/L — LOW (ref 22–31)
CO2 SERPL-SCNC: 23 MMOL/L — SIGNIFICANT CHANGE UP (ref 22–31)
COLOR SPEC: YELLOW — SIGNIFICANT CHANGE UP
CREAT SERPL-MCNC: 0.51 MG/DL — SIGNIFICANT CHANGE UP (ref 0.5–1.3)
CREAT SERPL-MCNC: 0.55 MG/DL — SIGNIFICANT CHANGE UP (ref 0.5–1.3)
DIFF PNL FLD: NEGATIVE — SIGNIFICANT CHANGE UP
EOSINOPHIL # BLD AUTO: 0.11 K/UL — SIGNIFICANT CHANGE UP (ref 0–0.5)
EOSINOPHIL NFR BLD AUTO: 1.2 % — SIGNIFICANT CHANGE UP (ref 0–6)
GAS PNL BLDV: SIGNIFICANT CHANGE UP
GLUCOSE SERPL-MCNC: 333 MG/DL — HIGH (ref 70–99)
GLUCOSE SERPL-MCNC: 473 MG/DL — CRITICAL HIGH (ref 70–99)
GLUCOSE UR QL: >=1000
HCO3 BLDV-SCNC: 21 MMOL/L — SIGNIFICANT CHANGE UP (ref 20–27)
HCT VFR BLD CALC: 43.2 % — SIGNIFICANT CHANGE UP (ref 34.5–45)
HGB BLD-MCNC: 13.6 G/DL — SIGNIFICANT CHANGE UP (ref 11.5–15.5)
IMM GRANULOCYTES NFR BLD AUTO: 0.5 % — SIGNIFICANT CHANGE UP (ref 0–1.5)
KETONES UR-MCNC: NEGATIVE — SIGNIFICANT CHANGE UP
LACTATE SERPL-SCNC: 2.5 MMOL/L — HIGH (ref 0.5–2)
LACTATE SERPL-SCNC: 3.5 MMOL/L — HIGH (ref 0.5–2)
LEUKOCYTE ESTERASE UR-ACNC: NEGATIVE — SIGNIFICANT CHANGE UP
LIDOCAIN IGE QN: 30 U/L — SIGNIFICANT CHANGE UP (ref 7–60)
LYMPHOCYTES # BLD AUTO: 2.3 K/UL — SIGNIFICANT CHANGE UP (ref 1–3.3)
LYMPHOCYTES # BLD AUTO: 25 % — SIGNIFICANT CHANGE UP (ref 13–44)
MCHC RBC-ENTMCNC: 26.8 PG — LOW (ref 27–34)
MCHC RBC-ENTMCNC: 31.5 GM/DL — LOW (ref 32–36)
MCV RBC AUTO: 85 FL — SIGNIFICANT CHANGE UP (ref 80–100)
MONOCYTES # BLD AUTO: 0.7 K/UL — SIGNIFICANT CHANGE UP (ref 0–0.9)
MONOCYTES NFR BLD AUTO: 7.6 % — SIGNIFICANT CHANGE UP (ref 2–14)
NEUTROPHILS # BLD AUTO: 6.01 K/UL — SIGNIFICANT CHANGE UP (ref 1.8–7.4)
NEUTROPHILS NFR BLD AUTO: 65.5 % — SIGNIFICANT CHANGE UP (ref 43–77)
NITRITE UR-MCNC: NEGATIVE — SIGNIFICANT CHANGE UP
NRBC # BLD: 0 /100 WBCS — SIGNIFICANT CHANGE UP (ref 0–0)
PCO2 BLDV: 34 MMHG — LOW (ref 41–51)
PH BLDV: 7.42 — SIGNIFICANT CHANGE UP (ref 7.32–7.43)
PH UR: 6 — SIGNIFICANT CHANGE UP (ref 5–8)
PLATELET # BLD AUTO: 317 K/UL — SIGNIFICANT CHANGE UP (ref 150–400)
PO2 BLDV: 51 MMHG — SIGNIFICANT CHANGE UP
POTASSIUM SERPL-MCNC: 4 MMOL/L — SIGNIFICANT CHANGE UP (ref 3.5–5.3)
POTASSIUM SERPL-MCNC: 4.3 MMOL/L — SIGNIFICANT CHANGE UP (ref 3.5–5.3)
POTASSIUM SERPL-SCNC: 4 MMOL/L — SIGNIFICANT CHANGE UP (ref 3.5–5.3)
POTASSIUM SERPL-SCNC: 4.3 MMOL/L — SIGNIFICANT CHANGE UP (ref 3.5–5.3)
PROT SERPL-MCNC: 6.8 G/DL — SIGNIFICANT CHANGE UP (ref 6–8.3)
PROT UR-MCNC: NEGATIVE MG/DL — SIGNIFICANT CHANGE UP
RBC # BLD: 5.08 M/UL — SIGNIFICANT CHANGE UP (ref 3.8–5.2)
RBC # FLD: 14.3 % — SIGNIFICANT CHANGE UP (ref 10.3–14.5)
SAO2 % BLDV: 84 % — SIGNIFICANT CHANGE UP
SODIUM SERPL-SCNC: 133 MMOL/L — LOW (ref 135–145)
SODIUM SERPL-SCNC: 137 MMOL/L — SIGNIFICANT CHANGE UP (ref 135–145)
SP GR SPEC: 1.01 — SIGNIFICANT CHANGE UP (ref 1–1.03)
TROPONIN T SERPL-MCNC: <0.01 NG/ML — SIGNIFICANT CHANGE UP (ref 0–0.01)
UROBILINOGEN FLD QL: 0.2 E.U./DL — SIGNIFICANT CHANGE UP
WBC # BLD: 9.19 K/UL — SIGNIFICANT CHANGE UP (ref 3.8–10.5)
WBC # FLD AUTO: 9.19 K/UL — SIGNIFICANT CHANGE UP (ref 3.8–10.5)

## 2020-10-31 PROCEDURE — 70450 CT HEAD/BRAIN W/O DYE: CPT | Mod: 26

## 2020-10-31 PROCEDURE — 99285 EMERGENCY DEPT VISIT HI MDM: CPT

## 2020-10-31 PROCEDURE — 99223 1ST HOSP IP/OBS HIGH 75: CPT | Mod: GC

## 2020-10-31 RX ORDER — METOPROLOL TARTRATE 50 MG
5 TABLET ORAL ONCE
Refills: 0 | Status: COMPLETED | OUTPATIENT
Start: 2020-10-31 | End: 2020-10-31

## 2020-10-31 RX ORDER — ACETAMINOPHEN 500 MG
975 TABLET ORAL ONCE
Refills: 0 | Status: COMPLETED | OUTPATIENT
Start: 2020-10-31 | End: 2020-10-31

## 2020-10-31 RX ORDER — INSULIN HUMAN 100 [IU]/ML
10 INJECTION, SOLUTION SUBCUTANEOUS ONCE
Refills: 0 | Status: COMPLETED | OUTPATIENT
Start: 2020-10-31 | End: 2020-10-31

## 2020-10-31 RX ORDER — SODIUM CHLORIDE 9 MG/ML
1000 INJECTION INTRAMUSCULAR; INTRAVENOUS; SUBCUTANEOUS ONCE
Refills: 0 | Status: COMPLETED | OUTPATIENT
Start: 2020-10-31 | End: 2020-10-31

## 2020-10-31 RX ADMIN — INSULIN HUMAN 10 UNIT(S): 100 INJECTION, SOLUTION SUBCUTANEOUS at 20:07

## 2020-10-31 RX ADMIN — Medication 5 MILLIGRAM(S): at 20:07

## 2020-10-31 RX ADMIN — Medication 975 MILLIGRAM(S): at 21:30

## 2020-10-31 RX ADMIN — SODIUM CHLORIDE 1000 MILLILITER(S): 9 INJECTION INTRAMUSCULAR; INTRAVENOUS; SUBCUTANEOUS at 21:16

## 2020-10-31 RX ADMIN — SODIUM CHLORIDE 1000 MILLILITER(S): 9 INJECTION INTRAMUSCULAR; INTRAVENOUS; SUBCUTANEOUS at 20:05

## 2020-10-31 RX ADMIN — SODIUM CHLORIDE 1000 MILLILITER(S): 9 INJECTION INTRAMUSCULAR; INTRAVENOUS; SUBCUTANEOUS at 21:15

## 2020-10-31 NOTE — ED PROVIDER NOTE - MUSCULOSKELETAL, MLM
Spine appears normal, range of motion is not limited, no muscle or joint tenderness
Statement Selected

## 2020-10-31 NOTE — ED PROVIDER NOTE - OBJECTIVE STATEMENT
The pt is a 39 y/o F, who presents to ED c/o high sugars and BP x 1-2 wks.  Pt states "my BP has been stupid high", FS at homes > 600. PMH - HTN, DM - claims that is taking her losartan, norvasc, insulin and metformin. BP 200s/100s, states vision feels blurry x 10 d, h/a over front of head, had some chest pressure a wk ago - no cp today, + polyuria, + polydipsia. Denies fevers, chills, sob, cough, n/v/d, abd pain, flank pain, dysuria, syncope, dizziness

## 2020-10-31 NOTE — ED PROVIDER NOTE - ATTENDING CONTRIBUTION TO CARE
Pt w/ PMHx HTN, DM on Losartan 25 mg QD, Norvasc 5 mg, metformin, insulin p/w uncontrolled BS and BP over the past 1-2 weeks. BS has been > 600 and BPs 200s/100s. Pt was instructed by PCP to come to the ED several days ago. Pt reports b/l vision "fuzzy and  blurry" x 2 weeks, diffuse frontal HA, polyuria, and polydipsia. No f/c, dizziness, URI sx. Pt had chest pain several days ago, but not since. Reports compliance w/ all meds  EKG ST @ 117, No ST_T abn, anterior Q's, unchanged from prio  Lopressor 5 mg given, Insulin, IVF admin Pt w/ PMHx HTN, DM on Losartan 25 mg QD, Norvasc 5 mg, metformin, insulin p/w uncontrolled BS and BP over the past 1-2 weeks. BS has been > 600 and BPs 200s/100s. Pt was instructed by PCP to come to the ED several days ago. Pt reports b/l vision "fuzzy and  blurry" x 2 weeks, diffuse frontal HA, polyuria, and polydipsia. No f/c, dizziness, URI sx. Pt had chest pain several days ago, but not since. Reports compliance w/ all meds  EKG ST @ 117, No ST_T abn, anterior Q's, unchanged from prior  Lopressor 5 mg given, Insulin, IVF admin w/ improvement in vitals and BS. No AG acidosis.   Limited PE performed in the setting of the COVID10 pandemic, in efforts to limit exposure and cross-contamination  Constitutional: Well appearing, well nourished, awake, alert, oriented to person, place, time/situation and in no apparent distress.  ENMT: Airway patent.   Neuro: Alert and oriented x 3, face symmetric and speech fluent. Nml gross motor movement, grossly non focal. normal gait  Skin: Skin normal color for race, warm, dry and intact. No evidence of rash.  Psych: Alert and oriented to person, place, time/situation. normal mood and affect. no apparent risk to self or others.   HTNsive urgency, uncontrolled DM. R/o DKA, HONK. Low suspicion HTNsive emergency. HA is not worst of life, nor thunderclap, c/w prior HTNsive HA's. CT performed and w/o acute abnormalities. Admit to medicine for optimization of medication regimen.

## 2020-10-31 NOTE — ED ADULT NURSE NOTE - NSIMPLEMENTINTERV_GEN_ALL_ED
Implemented All Universal Safety Interventions:  [place] to call system. Call bell, personal items and telephone within reach. Instruct patient to call for assistance. Room bathroom lighting operational. Non-slip footwear when patient is off stretcher. Physically safe environment: no spills, clutter or unnecessary equipment. Stretcher in lowest position, wheels locked, appropriate side rails in place.

## 2020-10-31 NOTE — ED ADULT NURSE REASSESSMENT NOTE - NS ED NURSE REASSESS COMMENT FT1
Orders received, repeat labs drawn and sent, waiting results. Continuous monitoring of pt maintained.

## 2020-10-31 NOTE — ED PROVIDER NOTE - CLINICAL SUMMARY MEDICAL DECISION MAKING FREE TEXT BOX
morbidly obese pt w/hx htn/dm, presents for FS >600 at home and BP 200s/100s over past 1wk, claims to be taking her meds, pt c/o blurry vision, ha, polyuria, polydipsia, found to be hyperglycemic w/o gap, bp elevated - non focal neuro exam, pt given ivf, iv insulin and dose of iv lopressor, vs improved, fs improved, ct head neg, ekg non ischemic. hemodynamically stable, will admit since poorly controlled and needs ? med adjustment vs ? new med regimen

## 2020-10-31 NOTE — ED ADULT TRIAGE NOTE - OTHER COMPLAINTS
c/o of chest pain, headaches and blurred vision x few days.  Hx of hypertension and diabetes with uncontrolled both last week.  Pt reports pressure extremely high.

## 2020-10-31 NOTE — ED ADULT NURSE REASSESSMENT NOTE - NS ED NURSE REASSESS COMMENT FT1
Report received from Ankur RN, will assume care of pt at this time. Pt in ER room 17, on cardiac monitor, NIBP and SpO2, pt medicated per MAR, tolerated well. Assessment as noted,

## 2020-11-01 DIAGNOSIS — E11.9 TYPE 2 DIABETES MELLITUS WITHOUT COMPLICATIONS: ICD-10-CM

## 2020-11-01 DIAGNOSIS — T78.40XA ALLERGY, UNSPECIFIED, INITIAL ENCOUNTER: ICD-10-CM

## 2020-11-01 DIAGNOSIS — I16.0 HYPERTENSIVE URGENCY: ICD-10-CM

## 2020-11-01 DIAGNOSIS — R79.89 OTHER SPECIFIED ABNORMAL FINDINGS OF BLOOD CHEMISTRY: ICD-10-CM

## 2020-11-01 DIAGNOSIS — G43.909 MIGRAINE, UNSPECIFIED, NOT INTRACTABLE, WITHOUT STATUS MIGRAINOSUS: ICD-10-CM

## 2020-11-01 DIAGNOSIS — L90.5 SCAR CONDITIONS AND FIBROSIS OF SKIN: ICD-10-CM

## 2020-11-01 DIAGNOSIS — E11.65 TYPE 2 DIABETES MELLITUS WITH HYPERGLYCEMIA: ICD-10-CM

## 2020-11-01 DIAGNOSIS — R73.9 HYPERGLYCEMIA, UNSPECIFIED: ICD-10-CM

## 2020-11-01 DIAGNOSIS — R63.8 OTHER SYMPTOMS AND SIGNS CONCERNING FOOD AND FLUID INTAKE: ICD-10-CM

## 2020-11-01 DIAGNOSIS — R53.1 WEAKNESS: ICD-10-CM

## 2020-11-01 DIAGNOSIS — J45.909 UNSPECIFIED ASTHMA, UNCOMPLICATED: ICD-10-CM

## 2020-11-01 DIAGNOSIS — R51.9 HEADACHE, UNSPECIFIED: ICD-10-CM

## 2020-11-01 DIAGNOSIS — H53.8 OTHER VISUAL DISTURBANCES: ICD-10-CM

## 2020-11-01 LAB
A1C WITH ESTIMATED AVERAGE GLUCOSE RESULT: 14.3 % — HIGH (ref 4–5.6)
ALBUMIN SERPL ELPH-MCNC: 3.2 G/DL — LOW (ref 3.3–5)
ALP SERPL-CCNC: 129 U/L — HIGH (ref 40–120)
ALT FLD-CCNC: 32 U/L — SIGNIFICANT CHANGE UP (ref 10–45)
ANION GAP SERPL CALC-SCNC: 11 MMOL/L — SIGNIFICANT CHANGE UP (ref 5–17)
AST SERPL-CCNC: 43 U/L — HIGH (ref 10–40)
BASOPHILS # BLD AUTO: 0.02 K/UL — SIGNIFICANT CHANGE UP (ref 0–0.2)
BASOPHILS NFR BLD AUTO: 0.3 % — SIGNIFICANT CHANGE UP (ref 0–2)
BILIRUB SERPL-MCNC: 0.2 MG/DL — SIGNIFICANT CHANGE UP (ref 0.2–1.2)
BUN SERPL-MCNC: 5 MG/DL — LOW (ref 7–23)
CALCIUM SERPL-MCNC: 8.5 MG/DL — SIGNIFICANT CHANGE UP (ref 8.4–10.5)
CHLORIDE SERPL-SCNC: 102 MMOL/L — SIGNIFICANT CHANGE UP (ref 96–108)
CO2 SERPL-SCNC: 21 MMOL/L — LOW (ref 22–31)
CREAT SERPL-MCNC: 0.46 MG/DL — LOW (ref 0.5–1.3)
CULTURE RESULTS: SIGNIFICANT CHANGE UP
EOSINOPHIL # BLD AUTO: 0.13 K/UL — SIGNIFICANT CHANGE UP (ref 0–0.5)
EOSINOPHIL NFR BLD AUTO: 1.7 % — SIGNIFICANT CHANGE UP (ref 0–6)
ESTIMATED AVERAGE GLUCOSE: 364 MG/DL — HIGH (ref 68–114)
GLUCOSE SERPL-MCNC: 285 MG/DL — HIGH (ref 70–99)
HCT VFR BLD CALC: 41 % — SIGNIFICANT CHANGE UP (ref 34.5–45)
HGB BLD-MCNC: 12.6 G/DL — SIGNIFICANT CHANGE UP (ref 11.5–15.5)
IMM GRANULOCYTES NFR BLD AUTO: 0.8 % — SIGNIFICANT CHANGE UP (ref 0–1.5)
LACTATE SERPL-SCNC: 2.3 MMOL/L — HIGH (ref 0.5–2)
LYMPHOCYTES # BLD AUTO: 2.99 K/UL — SIGNIFICANT CHANGE UP (ref 1–3.3)
LYMPHOCYTES # BLD AUTO: 39.8 % — SIGNIFICANT CHANGE UP (ref 13–44)
MAGNESIUM SERPL-MCNC: 1.8 MG/DL — SIGNIFICANT CHANGE UP (ref 1.6–2.6)
MCHC RBC-ENTMCNC: 26.6 PG — LOW (ref 27–34)
MCHC RBC-ENTMCNC: 30.7 GM/DL — LOW (ref 32–36)
MCV RBC AUTO: 86.5 FL — SIGNIFICANT CHANGE UP (ref 80–100)
MONOCYTES # BLD AUTO: 0.56 K/UL — SIGNIFICANT CHANGE UP (ref 0–0.9)
MONOCYTES NFR BLD AUTO: 7.5 % — SIGNIFICANT CHANGE UP (ref 2–14)
NEUTROPHILS # BLD AUTO: 3.75 K/UL — SIGNIFICANT CHANGE UP (ref 1.8–7.4)
NEUTROPHILS NFR BLD AUTO: 49.9 % — SIGNIFICANT CHANGE UP (ref 43–77)
NRBC # BLD: 0 /100 WBCS — SIGNIFICANT CHANGE UP (ref 0–0)
PLATELET # BLD AUTO: 266 K/UL — SIGNIFICANT CHANGE UP (ref 150–400)
POTASSIUM SERPL-MCNC: 4 MMOL/L — SIGNIFICANT CHANGE UP (ref 3.5–5.3)
POTASSIUM SERPL-SCNC: 4 MMOL/L — SIGNIFICANT CHANGE UP (ref 3.5–5.3)
PROT SERPL-MCNC: 6.5 G/DL — SIGNIFICANT CHANGE UP (ref 6–8.3)
RBC # BLD: 4.74 M/UL — SIGNIFICANT CHANGE UP (ref 3.8–5.2)
RBC # FLD: 14.4 % — SIGNIFICANT CHANGE UP (ref 10.3–14.5)
SARS-COV-2 RNA SPEC QL NAA+PROBE: SIGNIFICANT CHANGE UP
SODIUM SERPL-SCNC: 134 MMOL/L — LOW (ref 135–145)
SPECIMEN SOURCE: SIGNIFICANT CHANGE UP
TSH SERPL-MCNC: 1.37 UIU/ML — SIGNIFICANT CHANGE UP (ref 0.35–4.94)
WBC # BLD: 7.51 K/UL — SIGNIFICANT CHANGE UP (ref 3.8–10.5)
WBC # FLD AUTO: 7.51 K/UL — SIGNIFICANT CHANGE UP (ref 3.8–10.5)

## 2020-11-01 PROCEDURE — 70553 MRI BRAIN STEM W/O & W/DYE: CPT | Mod: 26

## 2020-11-01 PROCEDURE — 73030 X-RAY EXAM OF SHOULDER: CPT | Mod: 26,LT

## 2020-11-01 PROCEDURE — 99233 SBSQ HOSP IP/OBS HIGH 50: CPT

## 2020-11-01 PROCEDURE — 99233 SBSQ HOSP IP/OBS HIGH 50: CPT | Mod: GC

## 2020-11-01 RX ORDER — LOSARTAN POTASSIUM 100 MG/1
50 TABLET, FILM COATED ORAL DAILY
Refills: 0 | Status: DISCONTINUED | OUTPATIENT
Start: 2020-11-02 | End: 2020-11-04

## 2020-11-01 RX ORDER — DEXTROSE 50 % IN WATER 50 %
25 SYRINGE (ML) INTRAVENOUS ONCE
Refills: 0 | Status: DISCONTINUED | OUTPATIENT
Start: 2020-11-01 | End: 2020-11-04

## 2020-11-01 RX ORDER — NYSTATIN CREAM 100000 [USP'U]/G
1 CREAM TOPICAL
Refills: 0 | Status: DISCONTINUED | OUTPATIENT
Start: 2020-11-01 | End: 2020-11-04

## 2020-11-01 RX ORDER — INSULIN LISPRO 100/ML
25 VIAL (ML) SUBCUTANEOUS
Refills: 0 | Status: DISCONTINUED | OUTPATIENT
Start: 2020-11-01 | End: 2020-11-01

## 2020-11-01 RX ORDER — LOSARTAN POTASSIUM 100 MG/1
25 TABLET, FILM COATED ORAL ONCE
Refills: 0 | Status: COMPLETED | OUTPATIENT
Start: 2020-11-01 | End: 2020-11-01

## 2020-11-01 RX ORDER — FLUTICASONE PROPIONATE 50 MCG
1 SPRAY, SUSPENSION NASAL EVERY 12 HOURS
Refills: 0 | Status: DISCONTINUED | OUTPATIENT
Start: 2020-11-01 | End: 2020-11-04

## 2020-11-01 RX ORDER — INSULIN GLARGINE 100 [IU]/ML
60 INJECTION, SOLUTION SUBCUTANEOUS AT BEDTIME
Refills: 0 | Status: DISCONTINUED | OUTPATIENT
Start: 2020-11-01 | End: 2020-11-01

## 2020-11-01 RX ORDER — AMLODIPINE BESYLATE 2.5 MG/1
10 TABLET ORAL DAILY
Refills: 0 | Status: DISCONTINUED | OUTPATIENT
Start: 2020-11-01 | End: 2020-11-04

## 2020-11-01 RX ORDER — DIPHENHYDRAMINE HCL 50 MG
25 CAPSULE ORAL EVERY 6 HOURS
Refills: 0 | Status: DISCONTINUED | OUTPATIENT
Start: 2020-11-01 | End: 2020-11-04

## 2020-11-01 RX ORDER — INSULIN LISPRO 100/ML
VIAL (ML) SUBCUTANEOUS
Refills: 0 | Status: DISCONTINUED | OUTPATIENT
Start: 2020-11-01 | End: 2020-11-04

## 2020-11-01 RX ORDER — DEXTROSE 50 % IN WATER 50 %
15 SYRINGE (ML) INTRAVENOUS ONCE
Refills: 0 | Status: DISCONTINUED | OUTPATIENT
Start: 2020-11-01 | End: 2020-11-04

## 2020-11-01 RX ORDER — GLUCAGON INJECTION, SOLUTION 0.5 MG/.1ML
1 INJECTION, SOLUTION SUBCUTANEOUS ONCE
Refills: 0 | Status: DISCONTINUED | OUTPATIENT
Start: 2020-11-01 | End: 2020-11-04

## 2020-11-01 RX ORDER — KETOROLAC TROMETHAMINE 30 MG/ML
30 SYRINGE (ML) INJECTION ONCE
Refills: 0 | Status: DISCONTINUED | OUTPATIENT
Start: 2020-11-01 | End: 2020-11-01

## 2020-11-01 RX ORDER — SODIUM CHLORIDE 9 MG/ML
1000 INJECTION, SOLUTION INTRAVENOUS
Refills: 0 | Status: DISCONTINUED | OUTPATIENT
Start: 2020-11-01 | End: 2020-11-04

## 2020-11-01 RX ORDER — MAGNESIUM SULFATE 500 MG/ML
2 VIAL (ML) INJECTION ONCE
Refills: 0 | Status: COMPLETED | OUTPATIENT
Start: 2020-11-01 | End: 2020-11-01

## 2020-11-01 RX ORDER — LOSARTAN POTASSIUM 100 MG/1
25 TABLET, FILM COATED ORAL DAILY
Refills: 0 | Status: DISCONTINUED | OUTPATIENT
Start: 2020-11-01 | End: 2020-11-01

## 2020-11-01 RX ORDER — ENOXAPARIN SODIUM 100 MG/ML
40 INJECTION SUBCUTANEOUS EVERY 12 HOURS
Refills: 0 | Status: DISCONTINUED | OUTPATIENT
Start: 2020-11-01 | End: 2020-11-04

## 2020-11-01 RX ORDER — INSULIN GLARGINE 100 [IU]/ML
40 INJECTION, SOLUTION SUBCUTANEOUS
Refills: 0 | Status: DISCONTINUED | OUTPATIENT
Start: 2020-11-01 | End: 2020-11-02

## 2020-11-01 RX ORDER — DEXTROSE 50 % IN WATER 50 %
12.5 SYRINGE (ML) INTRAVENOUS ONCE
Refills: 0 | Status: DISCONTINUED | OUTPATIENT
Start: 2020-11-01 | End: 2020-11-04

## 2020-11-01 RX ORDER — FLUTICASONE PROPIONATE 50 MCG
1 SPRAY, SUSPENSION NASAL DAILY
Refills: 0 | Status: DISCONTINUED | OUTPATIENT
Start: 2020-11-01 | End: 2020-11-01

## 2020-11-01 RX ORDER — ATORVASTATIN CALCIUM 80 MG/1
40 TABLET, FILM COATED ORAL AT BEDTIME
Refills: 0 | Status: DISCONTINUED | OUTPATIENT
Start: 2020-11-01 | End: 2020-11-04

## 2020-11-01 RX ORDER — INSULIN LISPRO 100/ML
35 VIAL (ML) SUBCUTANEOUS
Refills: 0 | Status: DISCONTINUED | OUTPATIENT
Start: 2020-11-01 | End: 2020-11-02

## 2020-11-01 RX ORDER — IBUPROFEN 200 MG
600 TABLET ORAL ONCE
Refills: 0 | Status: COMPLETED | OUTPATIENT
Start: 2020-11-01 | End: 2020-11-01

## 2020-11-01 RX ORDER — INSULIN LISPRO 100/ML
8 VIAL (ML) SUBCUTANEOUS ONCE
Refills: 0 | Status: COMPLETED | OUTPATIENT
Start: 2020-11-01 | End: 2020-11-01

## 2020-11-01 RX ORDER — FLUTICASONE PROPIONATE 50 MCG
1 SPRAY, SUSPENSION NASAL EVERY 12 HOURS
Refills: 0 | Status: DISCONTINUED | OUTPATIENT
Start: 2020-11-01 | End: 2020-11-01

## 2020-11-01 RX ADMIN — Medication 25 UNIT(S): at 12:19

## 2020-11-01 RX ADMIN — Medication 1 SPRAY(S): at 22:34

## 2020-11-01 RX ADMIN — ATORVASTATIN CALCIUM 40 MILLIGRAM(S): 80 TABLET, FILM COATED ORAL at 22:29

## 2020-11-01 RX ADMIN — Medication 600 MILLIGRAM(S): at 00:33

## 2020-11-01 RX ADMIN — LOSARTAN POTASSIUM 25 MILLIGRAM(S): 100 TABLET, FILM COATED ORAL at 06:34

## 2020-11-01 RX ADMIN — Medication 30 MILLIGRAM(S): at 06:41

## 2020-11-01 RX ADMIN — AMLODIPINE BESYLATE 10 MILLIGRAM(S): 2.5 TABLET ORAL at 06:34

## 2020-11-01 RX ADMIN — Medication 30 MILLIGRAM(S): at 07:14

## 2020-11-01 RX ADMIN — ENOXAPARIN SODIUM 40 MILLIGRAM(S): 100 INJECTION SUBCUTANEOUS at 18:45

## 2020-11-01 RX ADMIN — ENOXAPARIN SODIUM 40 MILLIGRAM(S): 100 INJECTION SUBCUTANEOUS at 06:34

## 2020-11-01 RX ADMIN — Medication 1 CAPSULE(S): at 18:45

## 2020-11-01 RX ADMIN — INSULIN GLARGINE 60 UNIT(S): 100 INJECTION, SOLUTION SUBCUTANEOUS at 01:44

## 2020-11-01 RX ADMIN — Medication 25 UNIT(S): at 08:54

## 2020-11-01 RX ADMIN — Medication 50 GRAM(S): at 12:14

## 2020-11-01 RX ADMIN — Medication 35 UNIT(S): at 18:45

## 2020-11-01 RX ADMIN — NYSTATIN CREAM 1 APPLICATION(S): 100000 CREAM TOPICAL at 22:30

## 2020-11-01 RX ADMIN — Medication 1 CAPSULE(S): at 14:34

## 2020-11-01 RX ADMIN — Medication 1 SPRAY(S): at 13:11

## 2020-11-01 RX ADMIN — Medication 1 CAPSULE(S): at 13:34

## 2020-11-01 RX ADMIN — Medication 10: at 22:33

## 2020-11-01 RX ADMIN — NYSTATIN CREAM 1 APPLICATION(S): 100000 CREAM TOPICAL at 10:31

## 2020-11-01 RX ADMIN — Medication 6: at 18:44

## 2020-11-01 RX ADMIN — Medication 6: at 08:54

## 2020-11-01 RX ADMIN — Medication 8 UNIT(S): at 01:45

## 2020-11-01 RX ADMIN — Medication 10: at 12:19

## 2020-11-01 RX ADMIN — Medication 30 MILLIGRAM(S): at 11:20

## 2020-11-01 RX ADMIN — INSULIN GLARGINE 40 UNIT(S): 100 INJECTION, SOLUTION SUBCUTANEOUS at 22:29

## 2020-11-01 RX ADMIN — LOSARTAN POTASSIUM 25 MILLIGRAM(S): 100 TABLET, FILM COATED ORAL at 18:45

## 2020-11-01 RX ADMIN — Medication 30 MILLIGRAM(S): at 11:05

## 2020-11-01 NOTE — H&P ADULT - PROBLEM SELECTOR PLAN 9
Reports h/o migraines, not on any medication. HAs may also be in setting of hypertensive urgency. S/p Tylenol/Ibuprofen in ED.   -C/w Tylenol prn   -Monitor BP, management as above H/o migraines, not on any medication. S/p Tylenol/Ibuprofen in ED.   -C/w Tylenol On Benadryl prn and Flonase   -C/w Benadryl and Flonase

## 2020-11-01 NOTE — H&P ADULT - ASSESSMENT
Patient is 41yo F PMH Type 2 DM, HTN, asthma, migraines, morbid obesity presenting for elevated BP and elevated FSG. On admission found to have FSG of 486, /122 with blurry vision and lactate of 3.5. Patient will be admitted for hypertensive urgency and hyperglycemia with need for further management.

## 2020-11-01 NOTE — H&P ADULT - PROBLEM SELECTOR PLAN 7
Reports pain at  scar. Scar is located at the site of abdominal skin folds. Scar is tender to palpation but otherwise non-erythematous/non-edematous and without discharge. Uses Nystatin powder at home   -C/w Nystatin powder

## 2020-11-01 NOTE — PROGRESS NOTE ADULT - SUBJECTIVE AND OBJECTIVE BOX
INTERVAL HPI/OVERNIGHT EVENTS:  As per night team, no overnight events. Patient seen and examined at bedside. Currently complaining of headache that is localized to temporal and posterior occipital regions bilaterally. Says these headaches feel worse than her prior history of migraines. Similar to H&P, pt admits to blurry vision with some "snow-like droplets" when looking straight ahead. Has not scheduled an ophthalmologist appointment in over one year despite being told of "red streaks behind her retina". Patient admits to polydipsia and polyuria. She denies fever, chills, CP, SOB, N/V/D/C, changes in bowel movements, LE swelling.     VITALS  Vital Signs Last 24 Hrs  T(C): 37 (2020 12:29), Max: 37 (31 Oct 2020 23:35)  T(F): 98.6 (2020 12:29), Max: 98.6 (31 Oct 2020 23:35)  HR: 112 (2020 18:45) (88 - 112)  BP: 156/93 (2020 18:45) (138/86 - 166/78)  BP(mean): --  RR: 20 (2020 12:29) (18 - 20)  SpO2: 98% (2020 12:29) (98% - 99%)    CAPILLARY BLOOD GLUCOSE      POCT Blood Glucose.: 251 mg/dL (2020 18:31)  POCT Blood Glucose.: 353 mg/dL (2020 12:15)  POCT Blood Glucose.: 273 mg/dL (2020 08:49)  POCT Blood Glucose.: 259 mg/dL (2020 07:30)  POCT Blood Glucose.: 304 mg/dL (31 Oct 2020 20:39)      PHYSICAL EXAM  General: Obese female lying in bed with handkerchief over face   HEENT: PERRL/ EOMI, no scleral icterus, MMM  Neck: Supple; no JVD  Respiratory: CTA B/L, no wheezes/crackles   Cardiovascular: Regular rhythm/rate; +S1 +S2  Gastrointestinal: Soft, lower abdominal tenderness to palpation, normoactive BS, no rebound, no guarding, no suprapubic tenderness  Extremities: WWP, 4/5 strength LUE and LLE, 5/5 strength RUE and RLE, no cyanosis, no clubbing, no edema, pulses equal  Neurological: A&Ox3, CNII-XII grossly intact, no gross focal deficits, follows commands  Skin: Normal temperature, warm, dry    MEDICATIONS  (STANDING):  amLODIPine   Tablet 10 milliGRAM(s) Oral daily  atorvastatin 40 milliGRAM(s) Oral at bedtime  dextrose 5%. 1000 milliLiter(s) (50 mL/Hr) IV Continuous <Continuous>  dextrose 50% Injectable 12.5 Gram(s) IV Push once  dextrose 50% Injectable 25 Gram(s) IV Push once  dextrose 50% Injectable 25 Gram(s) IV Push once  enoxaparin Injectable 40 milliGRAM(s) SubCutaneous every 12 hours  fluticasone propionate 50 MICROgram(s)/spray Nasal Spray 1 Spray(s) Both Nostrils every 12 hours  insulin glargine Injectable (LANTUS) 60 Unit(s) SubCutaneous at bedtime  insulin lispro (ADMELOG) corrective regimen sliding scale   SubCutaneous Before meals and at bedtime  insulin lispro Injectable (ADMELOG) 35 Unit(s) SubCutaneous three times a day before meals  nystatin Powder 1 Application(s) Topical two times a day    MEDICATIONS  (PRN):  acetaminophen 300 mG/butalbital 50 mG/ caffeine 40 mG 1 Capsule(s) Oral every 6 hours PRN Migraine  dextrose 40% Gel 15 Gram(s) Oral once PRN Blood Glucose LESS THAN 70 milliGRAM(s)/deciliter  diphenhydrAMINE 25 milliGRAM(s) Oral every 6 hours PRN Rash and/or Itching  glucagon  Injectable 1 milliGRAM(s) IntraMuscular once PRN Glucose LESS THAN 70 milligrams/deciliter      amoxicillin (Unknown)  Bactrim I.V. (Rash (Mod to Severe))  clindamycin (Pruritus)  iodine containing compounds (Hives; Anaphylaxis)  penicillin (Hives)  shellfish. (Hives; Anaphylaxis)      LABS                        12.6   7.51  )-----------( 266      ( 2020 06:07 )             41.0     11-    134<L>  |  102  |  5<L>  ----------------------------<  285<H>  4.0   |  21<L>  |  0.46<L>    Ca    8.5      2020 06:07  Mg     1.8     11-    TPro  6.5  /  Alb  3.2<L>  /  TBili  0.2  /  DBili  x   /  AST  43<H>  /  ALT  32  /  AlkPhos  129<H>        Urinalysis Basic - ( 31 Oct 2020 19:58 )    Color: Yellow / Appearance: Clear / S.010 / pH: x  Gluc: x / Ketone: NEGATIVE  / Bili: Negative / Urobili: 0.2 E.U./dL   Blood: x / Protein: NEGATIVE mg/dL / Nitrite: NEGATIVE   Leuk Esterase: NEGATIVE / RBC: x / WBC x   Sq Epi: x / Non Sq Epi: x / Bacteria: x      CARDIAC MARKERS ( 31 Oct 2020 19:58 )  x     / <0.01 ng/mL / x     / x     / x            RADIOLOGY & ADDITIONAL TESTS: Reviewed

## 2020-11-01 NOTE — PROGRESS NOTE ADULT - PROBLEM SELECTOR PLAN 7
Reports pain at  scar. Scar is located at the site of abdominal skin folds. Scar is tender to palpation but otherwise non-erythematous/non-edematous and without discharge. Uses Nystatin powder at home   - c/w Nystatin powder

## 2020-11-01 NOTE — H&P ADULT - PROBLEM SELECTOR PLAN 5
Management as above ADDENDUM: pt w/ chronic left upper arm and left lower ext weakness, also reports dropping objects w/ left hand; negative CTH , possibly diabetic neuropathy related vs other neurologic process. will need further workup as outpt, will check TSH and left shoulder xray given acute pain on palpation of left biceps tendon region (has FROM of left arm) ADDENDUM: pt w/ chronic left upper arm and left lower ext weakness, also reports dropping objects w/ left hand; negative CTH , possibly diabetic neuropathy related vs other neurologic process. will need further workup as outpt, will check TSH and left shoulder xray given acute pain on palpation of left biceps tendon region (has FROM of left arm)  -Neuro consult in AM

## 2020-11-01 NOTE — PROGRESS NOTE ADULT - PROBLEM SELECTOR PLAN 2
In ED Glu 486, s/p 20u reg insulin ->313. Pt reports her recent HgbA1c at endocrinologist Dr. Joseph's office was 16. At her PMD's office a few days ago her HgbA1c was 13.9. Pt recently started on U-500 65u TID (per pt and seen in HIE note).   - In-house A1c 14.3%  - Endo consulted, recommend Lantus 40U q12h and Lispro 35U TID   - Will draw AM chris/plasmin renin

## 2020-11-01 NOTE — PROGRESS NOTE ADULT - PROBLEM SELECTOR PLAN 4
Reports blurry vision, likely in setting of uncontrolled DM and hypertensive urgency. Reports last saw optho 1 year ago and was told there was some abnormality but pt unsure. Does wear glasses. On exam pt unable to read sign within 10ft.   - Patient admits to not seeing ophthalmologist in over one year. Recommend outpatient follow-up   - continue to monitor sxs as FSGs are more controlled in-house    ADDENDUM: reported having sudden visual change to seeing all white and shadows a four days ago, now improved but w/ persisting blurry vision, unable to see letters on cell phone despite enlarged font; followup optho recs ; pt w/ retinal dxd previously Reports blurry vision, likely in setting of uncontrolled DM and hypertensive urgency. Reports last saw optho 1 year ago and was told there was some abnormality but pt unsure. Does wear glasses. On exam pt unable to read sign within 10ft.   - Patient admits to not seeing ophthalmologist in over one year. Recommend outpatient follow-up   - continue to monitor sxs as FSGs are more controlled in-house

## 2020-11-01 NOTE — H&P ADULT - ATTENDING COMMENTS
patient seen and examined overnight     reviewed pertinent data , h&p    Pt in NAD but reports photophobia from light being turned on; perrla, EOMI , no focal CN deficits , MMM, s1s2, lungs cta b/l ; abdomen obese / nontender, mild erythema under skin of c section site, no purulence noted; no lower ext edema/ tenderness; +LUEXT and LLEXT 4/5 weakness, 4/5 left hand ; reports Left upper arm pain at biceps tendon on palpation    1. c/w BP management as above  2. on basal bolus regimen w/ ISS while awaiting endocrine consult in AM, followup a1c.   3. lactate likely elevated in setting of dehydration , resolving s/p fluids monitor lactate.   4. left shoulder xray and check TSH  5. followup optho recs re: acute change in vision few days ago, improved but w/ persisting blurriness  6. pain control re: migraine     rest of  plan as above patient seen and examined overnight     reviewed pertinent data , h&p    Pt in NAD but reports photophobia from light being turned on; perrla, EOMI , no focal CN deficits , MMM, s1s2, lungs cta b/l ; abdomen obese / nontender, mild erythema under skin of c section site, no purulence noted; no lower ext edema/ tenderness; +LUEXT and LLEXT 4/5 weakness, 4/5 left hand ; reports Left upper arm pain at biceps tendon on palpation    1. c/w BP management as above  2. on basal bolus regimen w/ ISS while awaiting endocrine consult in AM, followup a1c.   3. lactate likely elevated in setting of dehydration , resolving s/p fluids monitor lactate.   4. left shoulder xray and check TSH  5. followup optho recs re: acute change in vision few days ago, improved but w/ persisting blurriness; order MRI and consult neuro r/o pseudotumor cerebri   6. pain control re : HA    rest of  plan as above

## 2020-11-01 NOTE — H&P ADULT - PROBLEM SELECTOR PLAN 10
F: none  E: replete K <4, Mg <2  N: DASH/TLC/CC   GI Ppx: None   DVT Ppx: Lovenox   Code status: FULL   Dispo: [facility]F     1) PCP Contacted on Admission: (Y/N) --> Name & Phone #:  2) Date of Contact with PCP:  3) PCP Contacted at Discharge: (Y/N, N/A)  4) Summary of Handoff Given to PCP:   5) Post-Discharge Appointment Date and Location:

## 2020-11-01 NOTE — H&P ADULT - PROBLEM SELECTOR PLAN 3
Found to have lactate of 3.5. S/p 2L S, repeat lactate 2.5. Pt afebrile, only tachycardic to 120 (EKG w/ sinus tach), no wbcs, no signs/symptoms of infection at this time. UA neg. May be in setting of elevated BP   -F/u AM lactate

## 2020-11-01 NOTE — H&P ADULT - PROBLEM SELECTOR PLAN 4
Reports blurry vision, likely in setting of uncontrolled DM and hypertensive urgency. Reports last saw optho 1 year ago and was told there was some abnormality but pt unsure. Does wear glasses. On exam pt unable to read sing within 10ft.   -Optho consult in the AM Reports blurry vision, likely in setting of uncontrolled DM and hypertensive urgency. Reports last saw optho 1 year ago and was told there was some abnormality but pt unsure. Does wear glasses. On exam pt unable to read sing within 10ft.   -F/u outpt w/ optho Reports blurry vision, likely in setting of uncontrolled DM and hypertensive urgency. Reports last saw optho 1 year ago and was told there was some abnormality but pt unsure. Does wear glasses. On exam pt unable to read sing within 10ft.   -Optho consult in the AM    ADDENDUM: reported having sudden visual change to seeing all white and shadows a four days ago, now improved but w/ persisting blurry vision, unable to see letters on cell phone despite enlarged font; followup optho recs ; pt w/ retinal dxd previously

## 2020-11-01 NOTE — H&P ADULT - HISTORY OF PRESENT ILLNESS
Patient is 41yo F PMH Type 2 DM, HTN, asthma, migraines, morbid obesity presenting for elevated BP and elevated FSG. Pt report for the past week her BP has been 255/133 and her FSG has been >600. Also reports blurry vision and frequent headaches. She saw her endocrinologist Dr. Joseph 1.5 weeks ago who started her on U-500 80 TID as her HgbA1c was near 16. Pt also saw her PMD 3 days ago Patient is 41yo F PMH Type 2 DM, HTN, asthma, migraines, morbid obesity presenting for elevated BP and elevated FSG. Pt report for the past week her BP has been 255/133 and her FSG has been >600. Also reports blurry vision and frequent headaches. She saw her endocrinologist Dr. Joseph 1.5 weeks ago who started her on U-500 80 TID as her HgbA1c was near 16. Pt also saw her PMD 3 days ago who was concern about the blurry vision and wanted the pt to be ruled out for a stroke however pt was unable to go to the ED since she had no one to watch her children. Her PMD started her on new BP meds but the pt Patient is 39yo F PMH Type 2 DM, HTN, asthma, migraines, morbid obesity presenting for elevated BP and elevated FSG. Pt report for the past week her BP has been 255/133 and her FSG has been >600. Also reports blurry vision, frequent headaches, chest discomfort/pressure with pain in her left arm. She saw her endocrinologist Dr. Joseph 1.5 weeks ago who started her on U-500 80 TID as her HgbA1c was near 16. Pt also saw her PMD 3 days ago who was concern about the blurry vision and wanted the pt to be ruled out for a stroke however pt was unable to go to the ED since she had no one to watch her children. Her PMD started her on new BP meds but the pt does not remember the names. She reports 4 days of malodor from her  scar. On ROS pt says she has ongoing polydipsia, NBNB vomiting x1; denies fevers, chills, HA, chest pain, sob, abdominal pain, diarrhea, constipation, dysuria. Of note she recently finished Abx for a urine cx that grew staph, follows with Dr. Kauffman.     ED Course   VS: 98.3, 120, 184/122,   Labs:   Imaging:   Received:       Patient is 39yo F PMH Type 2 DM, HTN, asthma, migraines, morbid obesity presenting for elevated BP and elevated FSG. Pt report for the past week her BP has been 255/133 and her FSG has been >600. Also reports blurry vision, frequent headaches, chest discomfort/pressure with pain in her left arm. She saw her endocrinologist Dr. Joseph 1.5 weeks ago who started her on U-500 65 TID as her HgbA1c was near 16. Pt also saw her PMD 3 days ago who was concern about the blurry vision and wanted the pt to be ruled out for a stroke however pt was unable to go to the ED since she had no one to watch her children. Her PMD started her on new BP meds but the pt does not remember the names. She reports 4 days of malodor from her  scar. On ROS pt says she has ongoing polydipsia, NBNB vomiting x1; denies fevers, chills, HA, chest pain, sob, abdominal pain, diarrhea, constipation, dysuria. Of note she recently finished Abx for a urine cx that grew staph, follows with Dr. Kauffman.     ED Course   VS: 98.3, 120, 184/122, 20, 98%  Labs: Na 133, HCO3- 21, Lactate 3.5->2.5, Glu 486, Alb 3.2, Alk phos 136, Ast 58, UA >1000  Imaging: CTH neg, EKG sinus tach w/ q waves in leads v3-v6   Received: 2L NS, Tylenol, 2ou Reg insulin, Lopressor 5mg       Patient is 41yo F PMH Type 2 DM, HTN, asthma, migraines, morbid obesity presenting for elevated BP and elevated FSG. Pt report for the past week her BP has been 255/133 and her FSG has been >600. Also reports blurry vision, frequent headaches, chest discomfort/pressure with pain in her left arm. She saw her endocrinologist Dr. Joseph 1.5 weeks ago who started her on U-500 65 TID as her HgbA1c was near 16. Pt also saw her PMD 3 days ago who was concern about the blurry vision and wanted the pt to be ruled out for a stroke however pt was unable to go to the ED since she had no one to watch her children. Her PMD started her on new BP meds but the pt does not remember the names. She reports 4 days of malodor from her  scar. On ROS pt says she has ongoing polydipsia, NBNB vomiting x1; denies fevers, chills, HA, chest pain, sob, abdominal pain, diarrhea, constipation, dysuria. Of note she recently finished Abx for a urine cx that grew staph, follows with Dr. Kauffman.     ED Course   VS: 98.3, 120, 184/122,   Labs: Na 133, HCO3- 21, Lactate 3.5->2.5, Glu 486, Alb 3.2, Alk phos 136, Ast 58, UA >1000  Imaging: CTH neg, EKG sinus tach w/ q waves in leads v3-v6   Received: 2L NS, Tylenol, 2ou Reg insulin, Lopressor 5mg

## 2020-11-01 NOTE — PROGRESS NOTE ADULT - PROBLEM SELECTOR PLAN 3
Found to have lactate of 3.5. S/p 2L S, repeat lactate 2.5. Pt afebrile, only tachycardic to 120 (EKG w/ sinus tach), no wbcs, no signs/symptoms of infection at this time. UA neg. May be in setting of elevated BP   - AM lactate 2.3, no need to trend at this time

## 2020-11-01 NOTE — H&P ADULT - NSHPPHYSICALEXAM_GEN_ALL_CORE
VITAL SIGNS:  T(C): 37 (10-31-20 @ 23:35), Max: 37 (10-31-20 @ 23:35)  T(F): 98.6 (10-31-20 @ 23:35), Max: 98.6 (10-31-20 @ 23:35)  HR: 95 (10-31-20 @ 23:35) (95 - 120)  BP: 143/88 (10-31-20 @ 23:35) (143/88 - 184/122)  BP(mean): --  RR: 18 (10-31-20 @ 23:35) (18 - 20)  SpO2: 98% (10-31-20 @ 23:35) (98% - 99%)  Wt(kg): --    PHYSICAL EXAM:    Constitutional: WDWN, morbidly obese, resting comfortably in bed; NAD  Head: NC/AT  Eyes: PERRL, EOMI, anicteric sclera, Unable to read letters within 10ft due to blurry vision   ENT: no nasal discharge; uvula midline, no oropharyngeal erythema or exudates; MMM  Neck: supple; no JVD   Respiratory: CTA B/L; no W/R/R, no retractions  Cardiac: +S1/S2; RRR; no M/R/G; PMI non-displaced  Gastrointestinal: abdomen obese/soft, NT/ND; no rebound or guarding; +BSx4  Back: no CVAT B/L  Extremities: WWP, no clubbing or cyanosis; LE peripheral edema  Musculoskeletal: NROM x4; no joint swelling, tenderness or erythema  Vascular: 2+ radial, femoral, DP/PT pulses B/L  Dermatologic: skin warm, dry and intact;  scar with pain however without warmth or erythema   Lymphatic: no submandibular or cervical LAD  Neurologic: AAOx3; CNII-XII grossly intact; no focal deficits  Psychiatric: affect and characteristics of appearance, verbalizations, behaviors are appropriate

## 2020-11-01 NOTE — H&P ADULT - PROBLEM SELECTOR PLAN 6
On Benadryl prn and Flonase   -C/w Benadryl and Flonase Reports h/o migraines, not on any medication. HAs may also be in setting of hypertensive urgency. S/p Tylenol/Ibuprofen in ED.   -C/w Tylenol prn   -Monitor BP, management as above Reports h/o migraines, not on any medication. HAs may also be in setting of hypertensive urgency. S/p Tylenol/Ibuprofen in ED. C/f pseudotumor cerebri given morbid obesity and nature of HA.   -C/w Tylenol prn   -Monitor BP, management as above  -Ordered MRI head Reports h/o migraines, not on any medication. HAs may also be in setting of hypertensive urgency. S/p Tylenol/Ibuprofen in ED. C/f pseudotumor cerebri given morbid obesity and nature of HA.   -C/w Tylenol prn   -Monitor BP, management as above  -Ordered MRI head  -Neuro consult in AM Reports h/o migraines, not on any medication. HAs may also be in setting of hypertensive urgency. S/p Tylenol/Ibuprofen in ED. C/f pseudotumor cerebri given morbid obesity and nature of HA.   -C/w Tylenol prn   -Monitor BP, management as above  -Ordered MRI head  -Neuro consult in AM    ADDENDUM: pt w/ hx of migraines, current HA findings differing from previous migraines, located mainly at Left retrobulbar region, also reported at neck , plan as above

## 2020-11-01 NOTE — PROGRESS NOTE ADULT - ASSESSMENT
39 y/o Female PMHx Type 2 DM, HTN, asthma, migraines, morbid obesity who presents with elevated BP and FSG, found to have FSG of 486, /122 with blurry vision and lactate of 3.5. admitted for hypertensive urgency and hyperglycemia with need for further management.

## 2020-11-01 NOTE — H&P ADULT - PROBLEM SELECTOR PLAN 1
Presented with /122. Pt reports for the past week her BP has been 252/156, has also reported blurry vision and migraines. Has elevated lactate to 3.5, s/p 1L NS now 2.5. Renal function wnl. S/p 5mg Lopressor in ED, repeat /88. CTH neg. On Losartan 25mg, Amlodipine 10mg at home.   -C/w Losartan 25mg   -C/w Amlodipine 10mg   -Monitor BP, may need to another med

## 2020-11-01 NOTE — H&P ADULT - NSHPLABSRESULTS_GEN_ALL_CORE
LABS:                         13.6   9.19  )-----------( 317      ( 31 Oct 2020 19:58 )             43.2     10-31    137  |  103  |  7   ----------------------------<  333<H>  4.0   |  23  |  0.51    Ca    8.4      31 Oct 2020 21:17    TPro  6.8  /  Alb  3.2<L>  /  TBili  0.2  /  DBili  x   /  AST  58<H>  /  ALT  37  /  AlkPhos  136<H>  10-31      Urinalysis Basic - ( 31 Oct 2020 19:58 )    Color: Yellow / Appearance: Clear / S.010 / pH: x  Gluc: x / Ketone: NEGATIVE  / Bili: Negative / Urobili: 0.2 E.U./dL   Blood: x / Protein: NEGATIVE mg/dL / Nitrite: NEGATIVE   Leuk Esterase: NEGATIVE / RBC: x / WBC x   Sq Epi: x / Non Sq Epi: x / Bacteria: x      CARDIAC MARKERS ( 31 Oct 2020 19:58 )  x     / <0.01 ng/mL / x     / x     / x            Lactate, Blood: 2.5 mmol/L (10-31 @ 21:16)  Lactate, Blood: 3.5 mmol/L (10-31 @ 19:57)      RADIOLOGY, EKG & ADDITIONAL TESTS:

## 2020-11-01 NOTE — H&P ADULT - PROBLEM SELECTOR PLAN 2
In ED Glu 486, s/p 20u reg insulin ->313. Pt reports her recent HgbA1c at endocrinologist Dr. Joseph's office was 16. At her PMD's office a few days ago her HgbA1c was 13.9. Pt recently started on U-500 65u TID (per pt and seen in HIE note).   -Start 60u Lantus at bedtime (based on last admission)  -Start 25u Lispro TID (based on last admission)   -F/u Hgba1c   -Consult endo in the AM, discuss switching pt to U500 dose

## 2020-11-01 NOTE — CONSULT NOTE ADULT - SUBJECTIVE AND OBJECTIVE BOX
HPI:  Patient is a 39 yo  with PMH of uncontrolled DM2 and HTN as well as a post operative wound infection following birth in  requiring IR drainage and several abdominal washout of wound site.  P/W worsening hyperglycemia and accelerated hypertension at home. Home insulin recently adjusted from basal bolus regimen to Humalin U500 65 units TIDAC, gradually increased over the last 2 weeks to 80 units TIDAC. She reports compliance w/ med regimen. Is not recycling Pen needles or injecting on a single site. she is not rationing insulin. she reports worsening HAs w/ vision changes (blurry vision), although she reports no loss of visual acuity. she denies past w/u for adrenal hormonal hypersecretion. reports past hypokalemia while hospitalized but not on home PO potassium replacement.      PMH & Surgical Hx:VULVAL CELLULITIS  Pre-eclampsia  Abdominal hernia  Obesity  Diabetes  Hyperlipidemia  Essential hypertension  Vulval cellulitis  Perineal rash in female  H/O exploratory laparotomy  H/O ventral hernia repair  H/O:   H/O LEEP      FH:  DM: in dad  Thyroid: denies  Autoimmune: deneis  Other:    SH:  Smoking: denies, but previously reported ex-smoker  Etoh: denies  Recreational Drugs: denies  Social Life: unemployed lives w three children, denied partner/spouse    MEDICATIONS  (STANDING):  amLODIPine   Tablet 10 milliGRAM(s) Oral daily  atorvastatin 40 milliGRAM(s) Oral at bedtime  dextrose 5%. 1000 milliLiter(s) (50 mL/Hr) IV Continuous <Continuous>  dextrose 50% Injectable 12.5 Gram(s) IV Push once  dextrose 50% Injectable 25 Gram(s) IV Push once  dextrose 50% Injectable 25 Gram(s) IV Push once  enoxaparin Injectable 40 milliGRAM(s) SubCutaneous every 12 hours  fluticasone propionate 50 MICROgram(s)/spray Nasal Spray 1 Spray(s) Both Nostrils every 12 hours  insulin glargine Injectable (LANTUS) 60 Unit(s) SubCutaneous at bedtime  insulin lispro (ADMELOG) corrective regimen sliding scale   SubCutaneous Before meals and at bedtime  insulin lispro Injectable (ADMELOG) 25 Unit(s) SubCutaneous three times a day before meals  losartan 25 milliGRAM(s) Oral once  nystatin Powder 1 Application(s) Topical two times a day      ROS:  Denies the following except as indicated.    General: weight loss/weight gain, decreased appetite, fatigue  Eyes: Blurry vision, double vision, visual changes  ENT: Throat pain, changes in voice,   CV: palpitations, SOB, CP, cough  GI: NVD, difficulty swallowing, abdominal pain  : polyuria, dysuria  Endo: abnormal menses, temperature intolerance, decreased libido  MSK: weakness, joint pain  Skin: rash, dryness, diaphoresis  Heme: Easy bruising,bleeding  Neuro: HA, dizziness, lightheadedness, numbness tingling  Psych: Anxiety, Depression    Vital Signs Last 24 Hrs  T(C): 37 (2020 12:), Max: 37 (31 Oct 2020 23:35)  T(F): 98.6 (2020 12:), Max: 98.6 (31 Oct 2020 23:35)  HR: 90 () (88 - 120)  BP: 138/86 (:) (138/86 - 184/122)  BP(mean): --  RR: 20 (:) (18 - 20)  SpO2: 98% (:) (98% - 99%)    Constitutional: wn/wd in NAD.  HEENT: NCAT, MMM, OP clear, EOMI, , no proptosis or lid retraction  Neck: no thyromegaly or palpable thyroid nodules   Respiratory: lungs CTAB.  Cardiovascular: regular rhythm, normal S1 and S2, no audible murmurs  GI: soft, NT/ND, no masses/HSM appreciated.  Neurology: no tremors, DTR 2+  Skin: no visible rashes/lesions  Psychiatric: AAO x 3, normal affect/mood.  Ext: radial pulses intact, DP pulses intact, extremities warm, no cyanosis, clubbing or edema.               CAPILLARY BLOOD GLUCOSE      POCT Blood Glucose.: 353 mg/dL (2020 12:15)  POCT Blood Glucose.: 273 mg/dL (2020 08:49)  POCT Blood Glucose.: 259 mg/dL (2020 07:30)  POCT Blood Glucose.: 304 mg/dL (31 Oct 2020 20:39)  POCT Blood Glucose.: 486 mg/dL (31 Oct 2020 19:21)

## 2020-11-01 NOTE — CONSULT NOTE ADULT - ASSESSMENT
- Increase Lantus to 40 units Q12 hours  - Increase Humalog to 35 units TIDAC + Mod Correction scale  - Would recommend adrenal work up at this time, start w/ 24 hour urine free cortisol and metanephrine collection. Check Aldosterone/Plasma renin activity in serum on next blood draws.  - Neuro consult for evaluation for possible Pseudotumor cerebri.  - Endocrinology team will continue to follow    Nael Rankin MD  T

## 2020-11-01 NOTE — PROGRESS NOTE ADULT - PROBLEM SELECTOR PLAN 6
Reports h/o migraines, not on any medication. HAs may also be in setting of hypertensive urgency. S/p Tylenol/Ibuprofen in ED.   - added PRN Fioricet for headaches   - Monitor BP, management as above  - Ordered MRI head, f/u results  - should symptoms not improve with glycemic control can consider neuro consult to r/o pseudotumor cerebri

## 2020-11-01 NOTE — PROGRESS NOTE ADULT - PROBLEM SELECTOR PLAN 5
Pt w/ chronic left upper arm and left lower ext weakness, also reports dropping objects w/ left hand; negative CTH , possibly diabetic neuropathy related vs other neurologic process.   - will need further workup as outpt  - TSH wnl   - Left shoulder XR negative for fracture and dislocation

## 2020-11-02 ENCOUNTER — TRANSCRIPTION ENCOUNTER (OUTPATIENT)
Age: 40
End: 2020-11-02

## 2020-11-02 LAB
ALBUMIN SERPL ELPH-MCNC: 3.2 G/DL — LOW (ref 3.3–5)
ALP SERPL-CCNC: 117 U/L — SIGNIFICANT CHANGE UP (ref 40–120)
ALT FLD-CCNC: 33 U/L — SIGNIFICANT CHANGE UP (ref 10–45)
ANION GAP SERPL CALC-SCNC: 12 MMOL/L — SIGNIFICANT CHANGE UP (ref 5–17)
AST SERPL-CCNC: 37 U/L — SIGNIFICANT CHANGE UP (ref 10–40)
BASOPHILS # BLD AUTO: 0.03 K/UL — SIGNIFICANT CHANGE UP (ref 0–0.2)
BASOPHILS NFR BLD AUTO: 0.3 % — SIGNIFICANT CHANGE UP (ref 0–2)
BILIRUB SERPL-MCNC: 0.3 MG/DL — SIGNIFICANT CHANGE UP (ref 0.2–1.2)
BUN SERPL-MCNC: 10 MG/DL — SIGNIFICANT CHANGE UP (ref 7–23)
CALCIUM SERPL-MCNC: 8.9 MG/DL — SIGNIFICANT CHANGE UP (ref 8.4–10.5)
CHLORIDE SERPL-SCNC: 100 MMOL/L — SIGNIFICANT CHANGE UP (ref 96–108)
CO2 SERPL-SCNC: 22 MMOL/L — SIGNIFICANT CHANGE UP (ref 22–31)
CREAT SERPL-MCNC: 0.48 MG/DL — LOW (ref 0.5–1.3)
CRP SERPL-MCNC: 1.71 MG/DL — HIGH (ref 0–0.4)
EOSINOPHIL # BLD AUTO: 0.17 K/UL — SIGNIFICANT CHANGE UP (ref 0–0.5)
EOSINOPHIL NFR BLD AUTO: 1.9 % — SIGNIFICANT CHANGE UP (ref 0–6)
ERYTHROCYTE [SEDIMENTATION RATE] IN BLOOD: 19 MM/HR — HIGH
GLUCOSE BLDC GLUCOMTR-MCNC: 285 MG/DL — HIGH (ref 70–99)
GLUCOSE BLDC GLUCOMTR-MCNC: 287 MG/DL — HIGH (ref 70–99)
GLUCOSE BLDC GLUCOMTR-MCNC: 306 MG/DL — HIGH (ref 70–99)
GLUCOSE SERPL-MCNC: 276 MG/DL — HIGH (ref 70–99)
HCT VFR BLD CALC: 41.9 % — SIGNIFICANT CHANGE UP (ref 34.5–45)
HGB BLD-MCNC: 13.4 G/DL — SIGNIFICANT CHANGE UP (ref 11.5–15.5)
IMM GRANULOCYTES NFR BLD AUTO: 0.7 % — SIGNIFICANT CHANGE UP (ref 0–1.5)
LYMPHOCYTES # BLD AUTO: 2.85 K/UL — SIGNIFICANT CHANGE UP (ref 1–3.3)
LYMPHOCYTES # BLD AUTO: 32 % — SIGNIFICANT CHANGE UP (ref 13–44)
MAGNESIUM SERPL-MCNC: 1.8 MG/DL — SIGNIFICANT CHANGE UP (ref 1.6–2.6)
MCHC RBC-ENTMCNC: 27.3 PG — SIGNIFICANT CHANGE UP (ref 27–34)
MCHC RBC-ENTMCNC: 32 GM/DL — SIGNIFICANT CHANGE UP (ref 32–36)
MCV RBC AUTO: 85.3 FL — SIGNIFICANT CHANGE UP (ref 80–100)
MONOCYTES # BLD AUTO: 0.75 K/UL — SIGNIFICANT CHANGE UP (ref 0–0.9)
MONOCYTES NFR BLD AUTO: 8.4 % — SIGNIFICANT CHANGE UP (ref 2–14)
NEUTROPHILS # BLD AUTO: 5.04 K/UL — SIGNIFICANT CHANGE UP (ref 1.8–7.4)
NEUTROPHILS NFR BLD AUTO: 56.7 % — SIGNIFICANT CHANGE UP (ref 43–77)
NRBC # BLD: 0 /100 WBCS — SIGNIFICANT CHANGE UP (ref 0–0)
PHOSPHATE SERPL-MCNC: 3 MG/DL — SIGNIFICANT CHANGE UP (ref 2.5–4.5)
PLATELET # BLD AUTO: 306 K/UL — SIGNIFICANT CHANGE UP (ref 150–400)
POTASSIUM SERPL-MCNC: 4.1 MMOL/L — SIGNIFICANT CHANGE UP (ref 3.5–5.3)
POTASSIUM SERPL-SCNC: 4.1 MMOL/L — SIGNIFICANT CHANGE UP (ref 3.5–5.3)
PROT SERPL-MCNC: 6.5 G/DL — SIGNIFICANT CHANGE UP (ref 6–8.3)
RBC # BLD: 4.91 M/UL — SIGNIFICANT CHANGE UP (ref 3.8–5.2)
RBC # FLD: 14.2 % — SIGNIFICANT CHANGE UP (ref 10.3–14.5)
SODIUM SERPL-SCNC: 134 MMOL/L — LOW (ref 135–145)
WBC # BLD: 8.9 K/UL — SIGNIFICANT CHANGE UP (ref 3.8–10.5)
WBC # FLD AUTO: 8.9 K/UL — SIGNIFICANT CHANGE UP (ref 3.8–10.5)

## 2020-11-02 PROCEDURE — 93010 ELECTROCARDIOGRAM REPORT: CPT

## 2020-11-02 PROCEDURE — 99233 SBSQ HOSP IP/OBS HIGH 50: CPT | Mod: GC

## 2020-11-02 PROCEDURE — 99232 SBSQ HOSP IP/OBS MODERATE 35: CPT | Mod: GC

## 2020-11-02 PROCEDURE — 71045 X-RAY EXAM CHEST 1 VIEW: CPT | Mod: 26

## 2020-11-02 RX ORDER — INSULIN GLARGINE 100 [IU]/ML
50 INJECTION, SOLUTION SUBCUTANEOUS
Refills: 0 | Status: DISCONTINUED | OUTPATIENT
Start: 2020-11-02 | End: 2020-11-03

## 2020-11-02 RX ORDER — HUMAN INSULIN 100 [IU]/ML
10 INJECTION, SUSPENSION SUBCUTANEOUS ONCE
Refills: 0 | Status: COMPLETED | OUTPATIENT
Start: 2020-11-02 | End: 2020-11-02

## 2020-11-02 RX ORDER — INSULIN LISPRO 100/ML
40 VIAL (ML) SUBCUTANEOUS
Refills: 0 | Status: DISCONTINUED | OUTPATIENT
Start: 2020-11-02 | End: 2020-11-03

## 2020-11-02 RX ORDER — IBUPROFEN 200 MG
800 TABLET ORAL
Refills: 0 | Status: DISCONTINUED | OUTPATIENT
Start: 2020-11-02 | End: 2020-11-02

## 2020-11-02 RX ORDER — KETOROLAC TROMETHAMINE 30 MG/ML
15 SYRINGE (ML) INJECTION EVERY 12 HOURS
Refills: 0 | Status: DISCONTINUED | OUTPATIENT
Start: 2020-11-02 | End: 2020-11-03

## 2020-11-02 RX ORDER — IBUPROFEN 200 MG
400 TABLET ORAL
Refills: 0 | Status: DISCONTINUED | OUTPATIENT
Start: 2020-11-02 | End: 2020-11-02

## 2020-11-02 RX ADMIN — Medication 1 CAPSULE(S): at 05:44

## 2020-11-02 RX ADMIN — AMLODIPINE BESYLATE 10 MILLIGRAM(S): 2.5 TABLET ORAL at 05:44

## 2020-11-02 RX ADMIN — Medication 6: at 08:43

## 2020-11-02 RX ADMIN — NYSTATIN CREAM 1 APPLICATION(S): 100000 CREAM TOPICAL at 22:22

## 2020-11-02 RX ADMIN — Medication 15 MILLIGRAM(S): at 17:35

## 2020-11-02 RX ADMIN — LOSARTAN POTASSIUM 50 MILLIGRAM(S): 100 TABLET, FILM COATED ORAL at 05:44

## 2020-11-02 RX ADMIN — HUMAN INSULIN 10 UNIT(S): 100 INJECTION, SUSPENSION SUBCUTANEOUS at 09:10

## 2020-11-02 RX ADMIN — ATORVASTATIN CALCIUM 40 MILLIGRAM(S): 80 TABLET, FILM COATED ORAL at 22:21

## 2020-11-02 RX ADMIN — INSULIN GLARGINE 50 UNIT(S): 100 INJECTION, SOLUTION SUBCUTANEOUS at 22:04

## 2020-11-02 RX ADMIN — INSULIN GLARGINE 40 UNIT(S): 100 INJECTION, SOLUTION SUBCUTANEOUS at 08:42

## 2020-11-02 RX ADMIN — Medication 1 CAPSULE(S): at 07:00

## 2020-11-02 RX ADMIN — Medication 35 UNIT(S): at 08:43

## 2020-11-02 RX ADMIN — Medication 1 SPRAY(S): at 22:22

## 2020-11-02 RX ADMIN — ENOXAPARIN SODIUM 40 MILLIGRAM(S): 100 INJECTION SUBCUTANEOUS at 05:43

## 2020-11-02 RX ADMIN — Medication 400 MILLIGRAM(S): at 07:06

## 2020-11-02 RX ADMIN — Medication 35 UNIT(S): at 12:16

## 2020-11-02 RX ADMIN — ENOXAPARIN SODIUM 40 MILLIGRAM(S): 100 INJECTION SUBCUTANEOUS at 17:35

## 2020-11-02 RX ADMIN — Medication 8: at 17:42

## 2020-11-02 RX ADMIN — Medication 6: at 22:03

## 2020-11-02 RX ADMIN — NYSTATIN CREAM 1 APPLICATION(S): 100000 CREAM TOPICAL at 09:14

## 2020-11-02 RX ADMIN — Medication 40 UNIT(S): at 17:46

## 2020-11-02 RX ADMIN — Medication 15 MILLIGRAM(S): at 18:38

## 2020-11-02 RX ADMIN — Medication 6: at 12:16

## 2020-11-02 RX ADMIN — Medication 800 MILLIGRAM(S): at 13:30

## 2020-11-02 RX ADMIN — Medication 1 CAPSULE(S): at 00:14

## 2020-11-02 RX ADMIN — Medication 800 MILLIGRAM(S): at 12:57

## 2020-11-02 RX ADMIN — Medication 1 CAPSULE(S): at 01:30

## 2020-11-02 NOTE — PROGRESS NOTE ADULT - PROBLEM SELECTOR PLAN 8
Patient reportedly uses albuterol PRN  - c/w PRN Albuterol On PRN benadryl and Flonase   -c/w Benadryl and Flonase

## 2020-11-02 NOTE — DISCHARGE NOTE PROVIDER - CARE PROVIDERS DIRECT ADDRESSES
[email or web address]

## 2020-11-02 NOTE — PROGRESS NOTE ADULT - PROBLEM SELECTOR PLAN 10
F: none  E: replete K <4, Mg <2  N: DASH/TLC/CC   GI Ppx: None   DVT Ppx: Lovenox   Code status: FULL   Dispo: F
F: none  E: replete K <4, Mg <2  N: DASH/TLC/CC   GI Ppx: None   DVT Ppx: Lovenox   Code status: FULL   Dispo: F

## 2020-11-02 NOTE — DISCHARGE NOTE PROVIDER - NSDCCPCAREPLAN_GEN_ALL_CORE_FT
PRINCIPAL DISCHARGE DIAGNOSIS  Diagnosis: Uncontrolled diabetes mellitus  Assessment and Plan of Treatment:       SECONDARY DISCHARGE DIAGNOSES  Diagnosis: HTN (hypertension)  Assessment and Plan of Treatment:      PRINCIPAL DISCHARGE DIAGNOSIS  Diagnosis: Uncontrolled diabetes mellitus  Assessment and Plan of Treatment: You have a diagnosis of type 2 diabetes (sometimes called type 2 "diabetes mellitus"). This is a disorder that disrupts the way your body uses sugar. All the cells in your body need sugar to work normally. Sugar gets into the cells with the help of a hormone called insulin. If there is not enough insulin, or if the body stops responding to insulin, sugar builds up in the blood. That is what happens to people with diabetes. Type 2 diabetes usually causes no symptoms. When symptoms do occur, they include the need to urinate often, intense thirst and blurry vision. Even though type 2 diabetes might not make you feel sick, it can cause serious problems over time, if it is not treated. The disorder can lead to heart attacks, strokes, kidney disease, vision problems (or even blindness), pain or loss of feeling in the hands and feet, and the need to have fingers, toes, or other body parts removed (amputated). In addition to maintaining an active lifestyle, losing weight, eating right, and not smoking it is important to take your diabetes medications as directed to control your blood sugar and prevent the possible complications from this disease. Your sugars while hospitalized were definitely uncontrolled and you need very high doses of insulin.   Instructions:  1. Take U-500 80 units three times per day  2. Create a log recording your morning, pre-meal, and bedtime sugars and show it to Dr. Joseph during your appointment.         SECONDARY DISCHARGE DIAGNOSES  Diagnosis: Blurry vision  Assessment and Plan of Treatment: You report chronic blurry vision and admitted to not following up with an ophthalmologist in over a year. While at [facility], an ophthalmologist evaluated you and attributed your blurry vision to poorly controlled sugars and did not recommend new eyeglasses given that the refractive error may shift from fluctuating glucose levels. Please schedule a follow-up appointment with an ophthalmologist affiliated with the [facility].    Diagnosis: Persistent headaches  Assessment and Plan of Treatment: You report an 8-10 day history of constant temporal (sides) and occipital (rear) headaches that are unlike migraines. We performed CT and MRIs of your head to rule out any bleeds and pseudotumor cerebrii. We consulted the neurology team and they believe you have persistent tension headaches. As such we will be sending you home on as needed Fioricet and Ibuprofen 800 mg every 12 hours. Do not take ibuprofen more than 2x per day as NSAIDs can predispose you to stomach ulcers and bleeding risk. We recommend you follow-up with Dr. Luna as an outpatient.    Diagnosis: HTN (hypertension)  Assessment and Plan of Treatment: You came to [facility] after seeing home blood pressure recordings of 255/133. We started you on your home amlodipine 10 mg daily. However, we increased your losartan dose from 25 to 50 milligrams. Your blood pressures were well controlled here. Please take losartan 50 milligrams and amlodipine 10 milligrams at home.     PRINCIPAL DISCHARGE DIAGNOSIS  Diagnosis: Uncontrolled diabetes mellitus  Assessment and Plan of Treatment: You have a diagnosis of type 2 diabetes (sometimes called type 2 "diabetes mellitus"). This is a disorder that disrupts the way your body uses sugar. All the cells in your body need sugar to work normally. Sugar gets into the cells with the help of a hormone called insulin. If there is not enough insulin, or if the body stops responding to insulin, sugar builds up in the blood. That is what happens to people with diabetes. Type 2 diabetes usually causes no symptoms. When symptoms do occur, they include the need to urinate often, intense thirst and blurry vision. Even though type 2 diabetes might not make you feel sick, it can cause serious problems over time, if it is not treated. The disorder can lead to heart attacks, strokes, kidney disease, vision problems (or even blindness), pain or loss of feeling in the hands and feet, and the need to have fingers, toes, or other body parts removed (amputated). In addition to maintaining an active lifestyle, losing weight, eating right, and not smoking it is important to take your diabetes medications as directed to control your blood sugar and prevent the possible complications from this disease. Your sugars while hospitalized were definitely uncontrolled and you need very high doses of insulin.   Instructions:  1. Take U-500 80 units three times per day  2. Create a log recording your morning, pre-meal, and bedtime sugars and show it to Dr. Joseph during your appointment.         SECONDARY DISCHARGE DIAGNOSES  Diagnosis: Blurry vision  Assessment and Plan of Treatment: You report chronic blurry vision and admitted to not following up with an ophthalmologist in over a year. While at [facility], an ophthalmologist evaluated you and attributed your blurry vision to poorly controlled sugars and did not recommend new eyeglasses given that the refractive error may shift from fluctuating glucose levels. Please schedule a follow-up appointment with an ophthalmologist affiliated with the [facility].    Diagnosis: Persistent headaches  Assessment and Plan of Treatment: You report an 8-10 day history of constant temporal (sides) and occipital (rear) headaches that are unlike migraines. We performed CT and MRIs of your head to rule out any bleeds and pseudotumor cerebrii. We consulted the neurology team and they believe you have persistent tension headaches. As such we will be sending you home on as needed Fioricet and Ibuprofen 800 mg every 12 hours. Do not take ibuprofen more than 2x per day as NSAIDs can predispose you to stomach ulcers and bleeding risk. We recommend you follow-up with Dr. Luna as an outpatient.  Instructions:  1. We will be prescribing you Fioricet to be taken every 6 hours as needed and ibuprofen 800 mg every 12 hours as needed. Since ibuprofen can irrigate the intestinal lining and cause ulcers, we are also prescribing pantoprazole 40 mg daily for 2 weeks.   2. Since Fioricet contains acetaminophen, please refrain from taking additional over-the-counter Tylenol as it can worsen your liver function.    Diagnosis: HTN (hypertension)  Assessment and Plan of Treatment: You came to [facility] after seeing home blood pressure recordings of 255/133. We started you on your home amlodipine 10 mg daily. However, we increased your losartan dose from 25 to 50 milligrams. Your blood pressures were well controlled here. Please take losartan 50 milligrams and amlodipine 10 milligrams at home.     PRINCIPAL DISCHARGE DIAGNOSIS  Diagnosis: Uncontrolled diabetes mellitus  Assessment and Plan of Treatment: You have a diagnosis of type 2 diabetes (sometimes called type 2 "diabetes mellitus"). This is a disorder that disrupts the way your body uses sugar. All the cells in your body need sugar to work normally. Sugar gets into the cells with the help of a hormone called insulin. If there is not enough insulin, or if the body stops responding to insulin, sugar builds up in the blood. That is what happens to people with diabetes. Type 2 diabetes usually causes no symptoms. When symptoms do occur, they include the need to urinate often, intense thirst and blurry vision. Even though type 2 diabetes might not make you feel sick, it can cause serious problems over time, if it is not treated. The disorder can lead to heart attacks, strokes, kidney disease, vision problems (or even blindness), pain or loss of feeling in the hands and feet, and the need to have fingers, toes, or other body parts removed (amputated). In addition to maintaining an active lifestyle, losing weight, eating right, and not smoking it is important to take your diabetes medications as directed to control your blood sugar and prevent the possible complications from this disease. Your sugars while hospitalized were definitely uncontrolled and you need very high doses of insulin.   Instructions:  1. Take U-500 80 units three times per day  2. For sugars >200 AFTER U-500, supplement with humalo-249 administer 5 units  250-299 administer 10 units  300-350 administer 15 units  >350 administer 20 units  3. Create a log recording your morning, pre-meal, and bedtime sugars and show it to Dr. Joseph during your appointment.         SECONDARY DISCHARGE DIAGNOSES  Diagnosis: Blurry vision  Assessment and Plan of Treatment: You report chronic blurry vision and admitted to not following up with an ophthalmologist in over a year. While at [facility], an ophthalmologist evaluated you and attributed your blurry vision to poorly controlled sugars and did not recommend new eyeglasses given that the refractive error may shift from fluctuating glucose levels. Please schedule a follow-up appointment with an ophthalmologist affiliated with the [facility].    Diagnosis: Persistent headaches  Assessment and Plan of Treatment: You report an 8-10 day history of constant temporal (sides) and occipital (rear) headaches that are unlike migraines. We performed CT and MRIs of your head to rule out any bleeds and pseudotumor cerebrii. We consulted the neurology team and they believe you have persistent tension headaches. As such we will be sending you home on as needed Fioricet and Ibuprofen 800 mg every 12 hours. Do not take ibuprofen more than 2x per day as NSAIDs can predispose you to stomach ulcers and bleeding risk. We recommend you follow-up with Dr. Luna as an outpatient.  Instructions:  1. We will be prescribing you Fioricet to be taken every 6 hours as needed and ibuprofen 800 mg every 12 hours as needed. Since ibuprofen can irrigate the intestinal lining and cause ulcers, we are also prescribing pantoprazole 40 mg daily for 2 weeks.   2. Since Fioricet contains acetaminophen, please refrain from taking additional over-the-counter Tylenol as it can worsen your liver function.    Diagnosis: HTN (hypertension)  Assessment and Plan of Treatment: You came to [facility] after seeing home blood pressure recordings of 255/133. We started you on your home amlodipine 10 mg daily. However, we increased your losartan dose from 25 to 50 milligrams. Your blood pressures were well controlled here. Please take losartan 50 milligrams and amlodipine 10 milligrams at home.

## 2020-11-02 NOTE — CONSULT NOTE ADULT - ASSESSMENT
A/P  1. Visual disturbance OU  Neuroimagings reviewed, neurology consult reviewed   likely secondary to refractive error due to acutely elevated serum glucose (as VA can be improved with pinhole)  Discussed with patient that she will not benefit from a new pair of glasses at this time as refractive error may shift secondary to fluctuating serum glucose    2. DM/HTN  Mild vasculopathy OU  Continue with tight BP/glycemic control as per primary and endocrine  Outpatient ophthalmology follow-up  Please schedule an appointment at [phone], [facility], [address] [place] [id number]

## 2020-11-02 NOTE — PROGRESS NOTE ADULT - PROBLEM SELECTOR PLAN 5
Pt w/ chronic left upper arm and left lower ext weakness, also reports dropping objects w/ left hand; negative CTH , possibly diabetic neuropathy related vs other neurologic process.   - will need further workup as outpt  - TSH wnl   - Left shoulder XR negative for fracture and dislocation Pt w/ chronic left upper arm and left lower ext weakness, also reports dropping objects w/ left hand; negative CTH , possibly diabetic neuropathy related vs other neurologic process.   - TSH wnl   - Left shoulder XR negative for fracture and dislocation

## 2020-11-02 NOTE — PROGRESS NOTE ADULT - PROBLEM SELECTOR PLAN 2
In ED Glu 486, s/p 20u reg insulin ->313. Pt reports her recent HgbA1c at endocrinologist Dr. Joseph's office was 16. At her PMD's office a few days ago her HgbA1c was 13.9. Pt recently started on U-500 65u TID (per pt and seen in HIE note).   - In-house A1c 14.3%  - Endo consulted, recommend Lantus 40U q12h and Lispro 35U TID   - Will draw AM chris/plasmin renin In ED Glu 486, s/p 20u reg insulin ->313. Pt reports her recent HgbA1c at endocrinologist Dr. Joseph's office was 16. At her PMD's office a few days ago her HgbA1c was 13.9. Pt recently started on U-500 65u TID (per pt and seen in HIE note).   - In-house A1c 14.3%  - Endo consulted, c/w Lantus 40U q12h and Lispro 35U TID   - AM , given 10U NPH   - Awaiting final endo recs and sugar control so patient can return home on appropriate regimen

## 2020-11-02 NOTE — PROGRESS NOTE ADULT - SUBJECTIVE AND OBJECTIVE BOX
INTERVAL HPI/OVERNIGHT EVENTS:  Overnight patient with /95 with . Patient felt her heart was racing, but endorses to similar episodes, and found to be coloring in coloring book. CXR was performed to r/o aortic pathology which did not reveal wide mediastinum on wet read. Patient likely tachycardic from Fioricet d/t caffeine component. Troponin level negative on AM labs. Patient seen and examined at bedside. Says that headache and LUE discomfort is slightly improved compared to yesterday, but still c/o blurry vision. Also admits to reduced polyuria and polydipsia since admission. On ROS patient denies fever, chills, CP, SOB, N/V/D/C, changes in bowel movements, LE swelling.     VITALS  Vital Signs Last 24 Hrs  T(C): 36.7 (2020 12:52), Max: 36.7 (2020 20:42)  T(F): 98 (2020 12:52), Max: 98.1 (2020 20:42)  HR: 92 (2020 12:52) (69 - 132)  BP: 136/88 (2020 12:52) (133/79 - 162/104)  BP(mean): --  RR: 18 (2020 12:52) (18 - 18)  SpO2: 98% (2020 12:52) (98% - 99%)    CAPILLARY BLOOD GLUCOSE      POCT Blood Glucose.: 287 mg/dL (2020 12:03)  POCT Blood Glucose.: 298 mg/dL (2020 08:39)  POCT Blood Glucose.: 266 mg/dL (2020 02:39)  POCT Blood Glucose.: 368 mg/dL (2020 22:01)  POCT Blood Glucose.: 251 mg/dL (2020 18:31)      PHYSICAL EXAM  General: WDWN, NAD, sitting comfortably in bed   HEENT: PERRL/ EOMI, no scleral icterus, MMM  Neck: Supple; no JVD  Respiratory: CTA B/L, no wheezes/crackles   Cardiovascular: Regular rhythm/rate; +S1 +S2  Gastrointestinal: Soft, NTND, normoactive BS, no rebound, no guarding, no suprapubic tenderness  Extremities: WWP, no cyanosis, no clubbing, no edema, pulses equal  Neurological: A&Ox3, CNII-XII grossly intact, no gross focal deficits, follows commands  Skin: Normal temperature, warm, dry    MEDICATIONS  (STANDING):  amLODIPine   Tablet 10 milliGRAM(s) Oral daily  atorvastatin 40 milliGRAM(s) Oral at bedtime  dextrose 5%. 1000 milliLiter(s) (50 mL/Hr) IV Continuous <Continuous>  dextrose 50% Injectable 12.5 Gram(s) IV Push once  dextrose 50% Injectable 25 Gram(s) IV Push once  dextrose 50% Injectable 25 Gram(s) IV Push once  enoxaparin Injectable 40 milliGRAM(s) SubCutaneous every 12 hours  fluticasone propionate 50 MICROgram(s)/spray Nasal Spray 1 Spray(s) Both Nostrils every 12 hours  insulin glargine Injectable (LANTUS) 40 Unit(s) SubCutaneous <User Schedule>  insulin lispro (ADMELOG) corrective regimen sliding scale   SubCutaneous Before meals and at bedtime  insulin lispro Injectable (ADMELOG) 35 Unit(s) SubCutaneous three times a day before meals  losartan 50 milliGRAM(s) Oral daily  nystatin Powder 1 Application(s) Topical two times a day    MEDICATIONS  (PRN):  dextrose 40% Gel 15 Gram(s) Oral once PRN Blood Glucose LESS THAN 70 milliGRAM(s)/deciliter  diphenhydrAMINE 25 milliGRAM(s) Oral every 6 hours PRN Rash and/or Itching  glucagon  Injectable 1 milliGRAM(s) IntraMuscular once PRN Glucose LESS THAN 70 milligrams/deciliter  ibuprofen  Tablet. 800 milliGRAM(s) Oral two times a day PRN Moderate Pain (4 - 6), Severe Pain (7 - 10)      amoxicillin (Unknown)  Bactrim I.V. (Rash (Mod to Severe))  clindamycin (Pruritus)  iodine containing compounds (Hives; Anaphylaxis)  penicillin (Hives)  shellfish. (Hives; Anaphylaxis)      LABS                        13.4   8.90  )-----------( 306      ( 2020 06:59 )             41.9     11-    134<L>  |  100  |  10  ----------------------------<  276<H>  4.1   |  22  |  0.48<L>    Ca    8.9      2020 06:59  Phos  3.0     11-  Mg     1.8     11-    TPro  6.5  /  Alb  3.2<L>  /  TBili  0.3  /  DBili  x   /  AST  37  /  ALT  33  /  AlkPhos  117  11-      Urinalysis Basic - ( 31 Oct 2020 19:58 )    Color: Yellow / Appearance: Clear / S.010 / pH: x  Gluc: x / Ketone: NEGATIVE  / Bili: Negative / Urobili: 0.2 E.U./dL   Blood: x / Protein: NEGATIVE mg/dL / Nitrite: NEGATIVE   Leuk Esterase: NEGATIVE / RBC: x / WBC x   Sq Epi: x / Non Sq Epi: x / Bacteria: x      CARDIAC MARKERS ( 2020 06:59 )  x     / <0.01 ng/mL / 39 U/L / x     / 1.3 ng/mL  CARDIAC MARKERS ( 31 Oct 2020 19:58 )  x     / <0.01 ng/mL / x     / x     / x            RADIOLOGY & ADDITIONAL TESTS: Reviewed INTERVAL HPI/OVERNIGHT EVENTS:  Overnight patient with /95 with . Patient felt her heart was racing, but endorses to similar episodes, and found to be coloring in coloring book. CXR was performed to r/o aortic pathology which did not reveal wide mediastinum on wet read. Patient likely tachycardic from Fioricet d/t caffeine component. Troponin level negative on AM labs. AM sugars 298 so given 10U NPH. Patient seen and examined at bedside. Says that headache and LUE discomfort is slightly improved compared to yesterday, but still c/o blurry vision. Also admits to reduced polyuria and polydipsia since admission. On ROS patient denies fever, chills, CP, SOB, N/V/D/C, changes in bowel movements, LE swelling.     VITALS  Vital Signs Last 24 Hrs  T(C): 36.7 (2020 12:52), Max: 36.7 (2020 20:42)  T(F): 98 (2020 12:52), Max: 98.1 (2020 20:42)  HR: 92 (2020 12:52) (69 - 132)  BP: 136/88 (2020 12:52) (133/79 - 162/104)  BP(mean): --  RR: 18 (2020 12:52) (18 - 18)  SpO2: 98% (2020 12:52) (98% - 99%)    CAPILLARY BLOOD GLUCOSE      POCT Blood Glucose.: 287 mg/dL (2020 12:03)  POCT Blood Glucose.: 298 mg/dL (2020 08:39)  POCT Blood Glucose.: 266 mg/dL (2020 02:39)  POCT Blood Glucose.: 368 mg/dL (2020 22:01)  POCT Blood Glucose.: 251 mg/dL (2020 18:31)      PHYSICAL EXAM  General: Obese female sitting in NAD  HEENT: PERRL/ EOMI, no scleral icterus, MMM  Neck: Supple; no JVD  Respiratory: CTA B/L, no wheezes/crackles   Cardiovascular: Regular rhythm/rate; +S1 +S2  Gastrointestinal: Obese abdomen, soft mildly diffuse tenderness to palpation, normoactive BS, no rebound, no guarding, no suprapubic tenderness  Extremities: WWP, 4/5 strength LUE and LLE, 5/5 strength RUE and RLE, no cyanosis, no clubbing, no edema, pulses equal  Neurological: A&Ox3, no gross focal deficits, follows commands  Skin: Normal temperature, warm, dry    MEDICATIONS  (STANDING):  amLODIPine   Tablet 10 milliGRAM(s) Oral daily  atorvastatin 40 milliGRAM(s) Oral at bedtime  dextrose 5%. 1000 milliLiter(s) (50 mL/Hr) IV Continuous <Continuous>  dextrose 50% Injectable 12.5 Gram(s) IV Push once  dextrose 50% Injectable 25 Gram(s) IV Push once  dextrose 50% Injectable 25 Gram(s) IV Push once  enoxaparin Injectable 40 milliGRAM(s) SubCutaneous every 12 hours  fluticasone propionate 50 MICROgram(s)/spray Nasal Spray 1 Spray(s) Both Nostrils every 12 hours  insulin glargine Injectable (LANTUS) 40 Unit(s) SubCutaneous <User Schedule>  insulin lispro (ADMELOG) corrective regimen sliding scale   SubCutaneous Before meals and at bedtime  insulin lispro Injectable (ADMELOG) 35 Unit(s) SubCutaneous three times a day before meals  losartan 50 milliGRAM(s) Oral daily  nystatin Powder 1 Application(s) Topical two times a day    MEDICATIONS  (PRN):  dextrose 40% Gel 15 Gram(s) Oral once PRN Blood Glucose LESS THAN 70 milliGRAM(s)/deciliter  diphenhydrAMINE 25 milliGRAM(s) Oral every 6 hours PRN Rash and/or Itching  glucagon  Injectable 1 milliGRAM(s) IntraMuscular once PRN Glucose LESS THAN 70 milligrams/deciliter  ibuprofen  Tablet. 800 milliGRAM(s) Oral two times a day PRN Moderate Pain (4 - 6), Severe Pain (7 - 10)      amoxicillin (Unknown)  Bactrim I.V. (Rash (Mod to Severe))  clindamycin (Pruritus)  iodine containing compounds (Hives; Anaphylaxis)  penicillin (Hives)  shellfish. (Hives; Anaphylaxis)      LABS                        13.4   8.90  )-----------( 306      ( 2020 06:59 )             41.9     11-02    134<L>  |  100  |  10  ----------------------------<  276<H>  4.1   |  22  |  0.48<L>    Ca    8.9      2020 06:59  Phos  3.0     11-  Mg     1.8     -    TPro  6.5  /  Alb  3.2<L>  /  TBili  0.3  /  DBili  x   /  AST  37  /  ALT  33  /  AlkPhos  117  11-      Urinalysis Basic - ( 31 Oct 2020 19:58 )    Color: Yellow / Appearance: Clear / S.010 / pH: x  Gluc: x / Ketone: NEGATIVE  / Bili: Negative / Urobili: 0.2 E.U./dL   Blood: x / Protein: NEGATIVE mg/dL / Nitrite: NEGATIVE   Leuk Esterase: NEGATIVE / RBC: x / WBC x   Sq Epi: x / Non Sq Epi: x / Bacteria: x      CARDIAC MARKERS ( 2020 06:59 )  x     / <0.01 ng/mL / 39 U/L / x     / 1.3 ng/mL  CARDIAC MARKERS ( 31 Oct 2020 19:58 )  x     / <0.01 ng/mL / x     / x     / x            RADIOLOGY & ADDITIONAL TESTS: Reviewed

## 2020-11-02 NOTE — CONSULT NOTE ADULT - REASON FOR ADMISSION
hyperglycemia, uncontrolled BP, blurry vision, HA

## 2020-11-02 NOTE — PROGRESS NOTE ADULT - PROBLEM SELECTOR PLAN 4
Reports blurry vision, likely in setting of uncontrolled DM and hypertensive urgency. Reports last saw optho 1 year ago and was told there was some abnormality but pt unsure. Does wear glasses. On exam pt unable to read sign within 10ft.   - Patient admits to not seeing ophthalmologist in over one year. Recommend outpatient follow-up   - continue to monitor sxs as FSGs are more controlled in-house Reports h/o migraines, not on any medication. HAs may also be in setting of hypertensive urgency. S/p Tylenol/Ibuprofen in ED.   - stopped Fioricet given overnight tachycardia 2/2 caffeine component  - c/w PRN ibuprofen for headaches (400 mg mod, 800 mg severe)  - MRI and CT head unremarkable for pathology

## 2020-11-02 NOTE — PROGRESS NOTE ADULT - SUBJECTIVE AND OBJECTIVE BOX
INTERVAL HPI/OVERNIGHT EVENTS:    Patient is a 40y old  Female who presents with a chief complaint of hyperglycemia, uncontrolled BP, blurry vision, HA (2020 18:17)  Pt was seen and examined at the bedside. Pt reports sever HA with bilateral blurry vision. Pt denies any Nausea or vomiting. fair appetite.     FSG & Insulin received:    Yesterday:  pre-dinner fs  nutritional lispro  35 units +6   units lispro SS  bedtime fs  lantus  40 units + 10   units lispro SS    Today:  pre-breakfast fs  nutritional lispro   35 units+6    units lispro SS  lantus  40 units  pre-lunch fs  nutritional lispro 35  units+6   units lispro SS      Pt reports the following symptoms:    CONSTITUTIONAL:  Negative fever or chills, good appetite  CARDIOVASCULAR:  Negative for chest pain or palpitations  RESPIRATORY:  Negative for cough, wheezing, or SOB   GASTROINTESTINAL:  Negative for nausea, vomiting, diarrhea, constipation, or abdominal pain  GENITOURINARY:  Negative frequency, urgency or dysuria    MEDICATIONS  (STANDING):  amLODIPine   Tablet 10 milliGRAM(s) Oral daily  atorvastatin 40 milliGRAM(s) Oral at bedtime  dextrose 5%. 1000 milliLiter(s) (50 mL/Hr) IV Continuous <Continuous>  dextrose 50% Injectable 12.5 Gram(s) IV Push once  dextrose 50% Injectable 25 Gram(s) IV Push once  dextrose 50% Injectable 25 Gram(s) IV Push once  enoxaparin Injectable 40 milliGRAM(s) SubCutaneous every 12 hours  fluticasone propionate 50 MICROgram(s)/spray Nasal Spray 1 Spray(s) Both Nostrils every 12 hours  insulin glargine Injectable (LANTUS) 50 Unit(s) SubCutaneous <User Schedule>  insulin lispro (ADMELOG) corrective regimen sliding scale   SubCutaneous Before meals and at bedtime  insulin lispro Injectable (ADMELOG) 40 Unit(s) SubCutaneous three times a day before meals  losartan 50 milliGRAM(s) Oral daily  nystatin Powder 1 Application(s) Topical two times a day    MEDICATIONS  (PRN):  dextrose 40% Gel 15 Gram(s) Oral once PRN Blood Glucose LESS THAN 70 milliGRAM(s)/deciliter  diphenhydrAMINE 25 milliGRAM(s) Oral every 6 hours PRN Rash and/or Itching  glucagon  Injectable 1 milliGRAM(s) IntraMuscular once PRN Glucose LESS THAN 70 milligrams/deciliter  ketorolac   Injectable 15 milliGRAM(s) IV Push every 12 hours PRN Severe Pain (7 - 10)      Past medical history reviewed  Family history reviewed  Social history reviewed    PHYSICAL EXAM  Vital Signs Last 24 Hrs  T(C): 36.7 (2020 12:52), Max: 36.7 (2020 20:42)  T(F): 98 (2020 12:52), Max: 98.1 (2020 20:42)  HR: 92 (2020 12:52) (69 - 132)  BP: 136/88 (2020 12:52) (133/79 - 162/104)  BP(mean): --  RR: 18 (2020 12:52) (18 - 18)  SpO2: 98% (2020 12:52) (98% - 99%)    Constitutional:  in NAD.   HEENT:  no proptosis or lid retraction  Neck: no thyromegaly or palpable thyroid nodules   Respiratory: lungs CTAB.  Cardiovascular: regular rhythm, normal S1 and S2  GI: soft, NT/ND  Neurology: no tremors, DTR 2+  Skin: no visible rashes/lesions  Psychiatric: AAO x 3, normal affect/mood.    LABS:                        13.4   8.90  )-----------( 306      ( 2020 06:59 )             41.9     11-02    134<L>  |  100  |  10  ----------------------------<  276<H>  4.1   |  22  |  0.48<L>    Ca    8.9      2020 06:59  Phos  3.0     11-02  Mg     1.8     11-02    TPro  6.5  /  Alb  3.2<L>  /  TBili  0.3  /  DBili  x   /  AST  37  /  ALT  33  /  AlkPhos  117  11-02      Urinalysis Basic - ( 31 Oct 2020 19:58 )    Color: Yellow / Appearance: Clear / S.010 / pH: x  Gluc: x / Ketone: NEGATIVE  / Bili: Negative / Urobili: 0.2 E.U./dL   Blood: x / Protein: NEGATIVE mg/dL / Nitrite: NEGATIVE   Leuk Esterase: NEGATIVE / RBC: x / WBC x   Sq Epi: x / Non Sq Epi: x / Bacteria: x      Thyroid Stimulating Hormone, Serum: 1.367 uIU/mL ( @ 06:07)  Thyroid Stimulating Hormone, Serum: 1.782 uIU/mL ( @ 09:29)      HbA1C: 14.3 % ( @ 08:35)  12.9 % ( @ 09:29)  12.0 % ( @ 07:11)      CAPILLARY BLOOD GLUCOSE      POCT Blood Glucose.: 306 mg/dL (2020 17:18)  POCT Blood Glucose.: 287 mg/dL (2020 12:03)  POCT Blood Glucose.: 298 mg/dL (2020 08:39)  POCT Blood Glucose.: 266 mg/dL (2020 02:39)  POCT Blood Glucose.: 368 mg/dL (2020 22:01)      Cholesterol, Serum: 219 mg/dL (20 @ 09:29)  HDL Cholesterol, Serum: 35 mg/dL (20 @ 09:29)  Triglycerides, Serum: 184 mg/dL (20 @ 09:29)  Direct LDL: 147 mg/dL (20 @ 09:29)    A/P: 41 y/o Female PMHx Type 2 DM, HTN, asthma, migraines, morbid obesity who presents with elevated BP and FSG, found to have FSG of 486, /122 with blurry vision and lactate of 3.5. Admitted for hypertensive urgency and hyperglycemia with need for further management. endo consulted for DM management.     1. DM type 2  A1c: 14.3%  weight: 127 kg  cr:0.4, GFR:123  - Increase Lantus to 50 units Q12 hours  - Increase Humalog to 40 units TIDAC + Mod Correction scale  -f/u Aldosterone/Plasma renin   - Endocrinology team will continue to follow  - ophthalmology consult for blurry vision     case seen and discussed with Dr. Bowden and primary team updated.     for discharge please make appointment  with Dr. Nisha Joseph.

## 2020-11-02 NOTE — PROGRESS NOTE ADULT - PROBLEM SELECTOR PLAN 7
Reports pain at  scar. Scar is located at the site of abdominal skin folds. Scar is tender to palpation but otherwise non-erythematous/non-edematous and without discharge. Uses Nystatin powder at home   - c/w Nystatin powder Patient reportedly uses albuterol PRN  - c/w PRN Albuterol

## 2020-11-02 NOTE — DISCHARGE NOTE PROVIDER - HOSPITAL COURSE
#Discharge: do not delete    41 y/o Female with PMHx Type 2 DM, HTN, asthma, migraines, morbid obesity presenting for elevated BP and elevated FSG. Pt report for the past week her BP has been 255/133 and her FSG has been >600. Also reports blurry vision x8-10d, frequent headaches, chest discomfort/pressure with pain in her left arm. Saw her endocrinologist Dr. Joseph 1.5 weeks ago who started her on U-500 65 TID as her HgbA1c was near 14-16. Pt also saw her PMD 3 days ago who was concerned about the blurry vision and wanted the pt to be ruled out for a stroke, but pt was unable to go to the ED since she had no one to watch her children. Also reports 4 days of malodor from her  scar. ED course notable for /122, lactate 3.5 -> 2.5, serum glc 486, UA > 1000 glucose and received 2L NS, 20U reg insulin, lopressor 5mg x1. On the floors increased home losarstan from 25 to 50 mg daily and c/w home amlodipine 10 mg daily. Endocrine consulted and adjusted insulin regimen which included Lantus 50U q12h and Lispro 40U TID premeals. MRI and CT scans done given severe headaches which returned negative for acute pathology. Tramadol PRN given for severe headaches with subsequent improvement. Workup for GCA unremarkable with mildly elevated ESR and CRP.    Problem List/Inpatient treatment course:   #Hypertensive urgency.    - Presented with /122. Pt reports for the past week her BP has been 252/156, has also reported blurry vision and migraines. Has elevated lactate to 3.5, s/p 1L NS now 2.5. Renal function wnl. S/p 5mg Lopressor in ED, repeat /88. CTH neg. On Losartan 25mg, Amlodipine 10mg at home.   - c/w losartan to 50 mg daily  - c/w amlodipine 10mg daily  - BPs well controlled until day of discharge    #Type 2 diabetes mellitus with hyperglycemia, with long-term current use of insulin.   - In ED Glu 486, s/p 20u reg insulin ->313. Pt reports her recent HgbA1c at endocrinologist Dr. Joseph's office was 16. At her PMD's office a few days ago her HgbA1c was 13.9. Pt recently started on U-500 65u TID (per pt and seen in HIE note).   - In-house A1c 14.3%  - Endo consulted, c/w Lantus 40U q12h and Lispro 35U TID   - AM , given 10U NPH   - Awaiting final endo recs and sugar control so patient can return home on appropriate regimen.     #Blurry vision.  Plan: Reports blurry vision, likely in setting of uncontrolled DM and hypertensive urgency. Reports last saw optho 1 year ago and was told there was some abnormality but pt unsure. Does wear glasses. On exam pt unable to read sign within 10ft.   - Patient admits to not seeing ophthalmologist in over one year.   - ESR 19 and CRP 1.7, not significantly elevated to consider GCA as potential etiology    #Acute intractable headache, unspecified headache type.    - Reports h/o migraines, not on any medication. HAs may also be in setting of hypertensive urgency. S/p Tylenol/Ibuprofen in ED.   - stopped Fioricet given overnight tachycardia 2/2 caffeine component  - c/w PRN Tramadol 15 mg IVP q12h for severe pain   - MRI and CT head unremarkable for pathology.     #Scar. Plan: Reports pain at  scar. Scar is located at the site of abdominal skin folds. Scar is tender to palpation but otherwise non-erythematous/non-edematous and without discharge. Uses Nystatin powder at home   - c/w Nystatin powder.    New medications: Insert insulin regimen here, losartan 50 mg daily  Labs to be followed outpatient: Vitals check, A1c (3 months), FSGs  Exam to be followed outpatient: PCP, endocrinology, ophthalmology, diabetic foot checks   #Discharge: do not delete    39 y/o Female with PMHx Type 2 DM, HTN, asthma, migraines, morbid obesity presenting for elevated BP and elevated FSG. Pt report for the past week her BP has been 255/133 and her FSG has been >600. Also reports blurry vision x8-10d, frequent headaches, chest discomfort/pressure with pain in her left arm. Saw her endocrinologist Dr. Joseph 1.5 weeks ago who started her on U-500 65 TID as her HgbA1c was near 14-16. Pt also saw her PMD 3 days ago who was concerned about the blurry vision and wanted the pt to be ruled out for a stroke, but pt was unable to go to the ED since she had no one to watch her children. Also reports 4 days of malodor from her  scar. ED course notable for /122, lactate 3.5 -> 2.5, serum glc 486, UA > 1000 glucose and received 2L NS, 20U reg insulin, lopressor 5mg x1. On the floors increased home losarstan from 25 to 50 mg daily and c/w home amlodipine 10 mg daily. Endocrine consulted and adjusted insulin regimen which included Lantus 50U q12h and Lispro 40U TID premeals. MRI and CT scans done given severe headaches which returned negative for acute pathology. Tramadol PRN given for severe headaches with subsequent improvement. Workup for GCA unremarkable with mildly elevated ESR and CRP.    Problem List/Inpatient treatment course:   #Hypertensive urgency.    - Presented with /122. Pt reports for the past week her BP has been 252/156, has also reported blurry vision and migraines. Has elevated lactate to 3.5, s/p 1L NS now 2.5. Renal function wnl. S/p 5mg Lopressor in ED, repeat /88. CTH neg. On Losartan 25mg, Amlodipine 10mg at home.   - c/w losartan to 50 mg daily  - c/w amlodipine 10mg daily  - BPs well controlled until day of discharge    #Type 2 diabetes mellitus with hyperglycemia, with long-term current use of insulin.   - In ED Glu 486, s/p 20u reg insulin ->313. Pt reports her recent HgbA1c at endocrinologist Dr. Joseph's office was 16. At her PMD's office a few days ago her HgbA1c was 13.9. Pt recently started on U-500 65u TID (per pt and seen in HIE note).   - In-house A1c 14.3%  - Endo consulted, c/w Lantus 50U q12h and Lispro 40U TID   - AM , given 10U NPH   - Awaiting final endo recs and sugar control so patient can return home on appropriate regimen.     #Blurry vision.  Plan: Reports blurry vision, likely in setting of uncontrolled DM and hypertensive urgency. Reports last saw optho 1 year ago and was told there was some abnormality but pt unsure. Does wear glasses. On exam pt unable to read sign within 10ft.   - Patient admits to not seeing ophthalmologist in over one year.   - ESR 19 and CRP 1.7, not significantly elevated to consider GCA as potential etiology  - Ophtho consulted, likely 2/2 refractive error d/t acutely elevated serum glucose. Pt will not benefit from new pair of glasses as refractive error may shift 2/2 fluctuating serum glucose    #Acute intractable headache, unspecified headache type.    - Reports h/o migraines, not on any medication. HAs may also be in setting of hypertensive urgency. S/p Tylenol/Ibuprofen in ED.   - stopped Fioricet given overnight tachycardia 2/2 caffeine component  - c/w PRN Ibuprofen 800 mg q12h   - MRI and CT head unremarkable for pathology.     #Scar. Plan: Reports pain at  scar. Scar is located at the site of abdominal skin folds. Scar is tender to palpation but otherwise non-erythematous/non-edematous and without discharge. Uses Nystatin powder at home   - c/w Nystatin powder.    New medications: Insert insulin regimen here, losartan 50 mg daily, one week course of Oxycodone   Labs to be followed outpatient: Vitals check, A1c (3 months), FSGs  Exam to be followed outpatient: PCP, endocrinology, ophthalmology, neurology   #Discharge: do not delete    41 y/o Female with PMHx Type 2 DM, HTN, asthma, migraines, morbid obesity presenting for elevated BP and elevated FSG. Pt reports for the past week her BP have been near 255/133 and FSGs >600. Also reports blurry vision x8-10d, frequent headaches, chest discomfort/pressure with pain in her left arm. Saw her endocrinologist Dr. Joseph 1.5 weeks ago who started her on U-500 65 TID as her HgbA1c was near 14-16. Pt also saw her PMD 3 days ago who was concerned about the blurry vision and wanted the pt to be ruled out for a stroke, but pt was unable to go to the ED since she had no one to watch her children. Also reports 4 days of malodor from her  scar. ED course notable for /122, lactate 3.5 -> 2.5, serum glc 486, UA > 1000 glucose and received 2L NS, 20U reg insulin, lopressor 5mg x1. On the floors increased home losarstan from 25 to 50 mg daily and c/w home amlodipine 10 mg daily. Endocrine consulted and persistently adjusted insulin regimen (Lantus 65U q12h and Lispro 55U TID premeals). MRI and CT scans done given severe headaches which returned negative for acute pathology. PRN Fioricet and ibuprofen given for severe headaches with subsequent improvement. Workup for GCA unremarkable with mildly elevated ESR and CRP.    Problem List/Inpatient treatment course:   #Hypertensive urgency.    - Presented with /122. Pt reports for the past week her BP has been 252/156, has also reported blurry vision and migraines. Has elevated lactate to 3.5, s/p 1L NS now 2.5. Renal function wnl. S/p 5mg Lopressor in ED, repeat /88. CTH neg. On Losartan 25mg, Amlodipine 10mg at home.   - c/w losartan to 50 mg daily  - c/w amlodipine 10mg daily  - BPs well controlled until day of discharge    #Type 2 diabetes mellitus with hyperglycemia, with long-term current use of insulin.   - In ED Glu 486, s/p 20u reg insulin ->313. Pt reports her recent HgbA1c at endocrinologist Dr. Joseph's office was 16. At her PMD's office a few days ago her HgbA1c was 13.9. Pt recently started on U-500 65u TID (per pt and seen in HIE note).   - In-house A1c 14.3%  - Endo consulted, insulin adjusted to Lantus 65U q12h and Lispro 55U TID   - Per endo, can resume home U-500 80U TID    #Blurry vision.  Plan: Reports blurry vision, likely in setting of uncontrolled DM and hypertensive urgency. Reports last saw optho 1 year ago and was told there was some abnormality but pt unsure. Does wear glasses. On exam pt unable to read sign within 10ft.   - Patient admits to not seeing ophthalmologist in over one year.   - ESR 19 and CRP 1.7, not significantly elevated to consider GCA as potential etiology  - Ophtho consulted, likely 2/2 refractive error d/t acutely elevated serum glucose. Pt will not benefit from new pair of glasses as refractive error may shift 2/2 fluctuating serum glucose  - MR Angio and venogram head performed to r/o SAH.  ____    #Acute intractable headache, unspecified headache type.    - Reports h/o migraines, not on any medication. HAs may also be in setting of hypertensive urgency. S/p Tylenol/Ibuprofen in ED.   - c/w PRN Fioricet q6h and ibuprofen 800 mg q12h   - MRI and CT head unremarkable for pathology.     #Scar. Plan: Reports pain at  scar. Scar is located at the site of abdominal skin folds. Scar is tender to palpation but otherwise non-erythematous/non-edematous and without discharge. Uses Nystatin powder at home   - c/w Nystatin powder.    New medications: losartan 50 mg daily, Oxycodone   Labs to be followed outpatient: Vitals check, A1c (3 months), FSGs  Exam to be followed outpatient: PCP, endocrinology, ophthalmology, neurology   #Discharge: do not delete    41 y/o Female with PMHx Type 2 DM, HTN, asthma, migraines, morbid obesity presenting for elevated BP and elevated FSG. Pt reports for the past week her BP have been near 255/133 and FSGs >600. Also reports blurry vision x8-10d, frequent headaches, chest discomfort/pressure with pain in her left arm. Saw her endocrinologist Dr. Joseph 1.5 weeks ago who started her on U-500 65 TID as her HgbA1c was near 14-16. Pt also saw her PMD 3 days ago who was concerned about the blurry vision and wanted the pt to be ruled out for a stroke, but pt was unable to go to the ED since she had no one to watch her children. Also reports 4 days of malodor from her  scar. ED course notable for /122, lactate 3.5 -> 2.5, serum glc 486, UA > 1000 glucose and received 2L NS, 20U reg insulin, lopressor 5mg x1. On the floors increased home losarstan from 25 to 50 mg daily and c/w home amlodipine 10 mg daily. Endocrine consulted and persistently adjusted insulin regimen (Lantus 65U q12h and Lispro 55U TID premeals). CT head, MRI head, MR Angio head, and MR venogram unremarkable for any acute bleed (r/o SAH) but questionable broad-based aneurysm of the cavernous segment of the right internal carotid artery, possibly artifactual. This could be further assessed or confirmed with CT angiogram of the head. dPRN Fioricet and ibuprofen given for severe headaches with subsequent improvement. Workup for GCA unremarkable with mildly elevated ESR and CRP.    Problem List/Inpatient treatment course:   #Hypertensive urgency.    - Presented with /122. Pt reports for the past week her BP has been 252/156, has also reported blurry vision and migraines. Has elevated lactate to 3.5, s/p 1L NS now 2.5. Renal function wnl. S/p 5mg Lopressor in ED, repeat /88. CTH neg. On Losartan 25mg, Amlodipine 10mg at home.   - c/w losartan to 50 mg daily  - c/w amlodipine 10mg daily  - BPs well controlled until day of discharge    #Type 2 diabetes mellitus with hyperglycemia, with long-term current use of insulin.   - In ED Glu 486, s/p 20u reg insulin ->313. Pt reports her recent HgbA1c at endocrinologist Dr. Joseph's office was 16. At her PMD's office a few days ago her HgbA1c was 13.9. Pt recently started on U-500 65u TID (per pt and seen in HIE note).   - In-house A1c 14.3%  - Endo consulted, insulin adjusted to Lantus 65U q12h and Lispro 55U TID   - Per endo, can resume home U-500 80U TID and follow with Dr. Joseph (can address need for insulin pump)    #Blurry vision.  Plan: Reports blurry vision, likely in setting of uncontrolled DM and hypertensive urgency. Reports last saw optho 1 year ago and was told there was some abnormality but pt unsure. Does wear glasses. On exam pt unable to read sign within 10ft.   - Patient admits to not seeing ophthalmologist in over one year.   - ESR 19 and CRP 1.7, not significantly elevated to consider GCA as potential etiology  - Ophtho consulted, likely 2/2 refractive error d/t acutely elevated serum glucose. Pt will not benefit from new pair of glasses as refractive error may shift 2/2 fluctuating serum glucose  - MR Angio and venogram head performed to r/o SAH. Questionable broad-based aneurysm of the cavernous segment of the right internal carotid artery, possibly artifactual. This could be further assessed or confirmed with CT angiogram of the head.    #Acute intractable headache, unspecified headache type.    - Reports h/o migraines, not on any medication. HAs may also be in setting of hypertensive urgency. S/p Tylenol/Ibuprofen in ED.   - c/w PRN Fioricet q6h and ibuprofen 800 mg q12h   - MRI and CT head unremarkable for acute pathology.     #Scar. Plan: Reports pain at  scar. Scar is located at the site of abdominal skin folds. Scar is tender to palpation but otherwise non-erythematous/non-edematous and without discharge. Uses Nystatin powder at home   - c/w Nystatin powder.    New medications: losartan 50 mg daily, Fioricet, Ibuprofen, pantoprazole  Labs to be followed outpatient: Vitals check, A1c (3 months), FSGs  Exam to be followed outpatient: PCP, endocrinology, ophthalmology, neurology   #Discharge: do not delete    39 y/o Female with PMHx Type 2 DM, HTN, asthma, migraines, morbid obesity presenting for elevated BP and elevated FSG. Pt reports for the past week her BP have been near 255/133 and FSGs >600. Also reports blurry vision x8-10d, frequent headaches, chest discomfort/pressure with pain in her left arm. Saw her endocrinologist Dr. Joseph 1.5 weeks ago who started her on U-500 65 TID as her HgbA1c was near 14-16. Pt also saw her PMD 3 days ago who was concerned about the blurry vision and wanted the pt to be ruled out for a stroke, but pt was unable to go to the ED since she had no one to watch her children. Also reports 4 days of malodor from her  scar. ED course notable for /122, lactate 3.5 -> 2.5, serum glc 486, UA > 1000 glucose and received 2L NS, 20U reg insulin, lopressor 5mg x1. On the floors increased home losarstan from 25 to 50 mg daily and c/w home amlodipine 10 mg daily. Endocrine consulted and persistently adjusted insulin regimen (Lantus 65U q12h and Lispro 55U TID premeals). CT head, MRI head, MR Angio head, and MR venogram unremarkable for any acute bleed (r/o SAH) but questionable broad-based aneurysm of the cavernous segment of the right internal carotid artery, possibly artifactual. This could be further assessed or confirmed with CT angiogram of the head. dPRN Fioricet and ibuprofen given for severe headaches with subsequent improvement. Workup for GCA unremarkable with mildly elevated ESR and CRP.    Problem List/Inpatient treatment course:   #Hypertensive urgency.    - Presented with /122. Pt reports for the past week her BP has been 252/156, has also reported blurry vision and migraines. Has elevated lactate to 3.5, s/p 1L NS now 2.5. Renal function wnl. S/p 5mg Lopressor in ED, repeat /88. CTH neg. On Losartan 25mg, Amlodipine 10mg at home.   - c/w losartan to 50 mg daily  - c/w amlodipine 10mg daily  - BPs well controlled until day of discharge    #Type 2 diabetes mellitus with hyperglycemia, with long-term current use of insulin.   - In ED Glu 486, s/p 20u reg insulin ->313. Pt reports her recent HgbA1c at endocrinologist Dr. Joseph's office was 16. At her PMD's office a few days ago her HgbA1c was 13.9. Pt recently started on U-500 65u TID (per pt and seen in HIE note).   - In-house A1c 14.3%  - Endo consulted, insulin adjusted to Lantus 65U q12h and Lispro 55U TID   - Per endo, can resume home U-500 80U TID:  Supplement for sugars >200 AFTER U-500:  200-249 administer 5 units  250-299 administer 10 units  300-350 administer 15 units  >350 administer 20 units  - Pt to follow with Dr. Joseph (can address need for insulin pump)    #Blurry vision.  Plan: Reports blurry vision, likely in setting of uncontrolled DM and hypertensive urgency. Reports last saw optho 1 year ago and was told there was some abnormality but pt unsure. Does wear glasses. On exam pt unable to read sign within 10ft.   - Patient admits to not seeing ophthalmologist in over one year.   - ESR 19 and CRP 1.7, not significantly elevated to consider GCA as potential etiology  - Ophtho consulted, likely 2/2 refractive error d/t acutely elevated serum glucose. Pt will not benefit from new pair of glasses as refractive error may shift 2/2 fluctuating serum glucose  - MR Angio and venogram head performed to r/o SAH. Questionable broad-based aneurysm of the cavernous segment of the right internal carotid artery, possibly artifactual. This could be further assessed or confirmed with CT angiogram of the head.    #Acute intractable headache, unspecified headache type.    - Reports h/o migraines, not on any medication. HAs may also be in setting of hypertensive urgency. S/p Tylenol/Ibuprofen in ED.   - c/w PRN Fioricet q6h and ibuprofen 800 mg q12h   - MRI and CT head unremarkable for acute pathology.     #Scar. Plan: Reports pain at  scar. Scar is located at the site of abdominal skin folds. Scar is tender to palpation but otherwise non-erythematous/non-edematous and without discharge. Uses Nystatin powder at home   - c/w Nystatin powder.    New medications: losartan 50 mg daily, Fioricet, Ibuprofen, pantoprazole, Humalog  Labs to be followed outpatient: Vitals check, A1c (3 months), FSGs  Exam to be followed outpatient: PCP, endocrinology, ophthalmology, neurology

## 2020-11-02 NOTE — DISCHARGE NOTE PROVIDER - NSDCMRMEDTOKEN_GEN_ALL_CORE_FT
Allergy (Diphenhydramine HCl) 25 mg oral tablet: 1 tab(s) orally every 6 hours, As Needed -for mild pain - for itching   atorvastatin 40 mg oral tablet: 1 tab(s) orally once a day (at bedtime)  Flonase 50 mcg/inh nasal spray: 1 spray(s) nasal once a day  insulin isophane 100 units/mL subcutaneous suspension (obsolete): U500   losartan 25 mg oral tablet: 1 tab(s) orally once a day  metFORMIN 1000 mg oral tablet: 1 tab(s) orally 2 times a day  Norvasc 10 mg oral tablet: 1 tab(s) orally once a day  nystatin 100,000 units/g topical powder: 1 application topically every 8 hours   alcohol swabs : Apply topically to affected area 4 times a day   Allergy (Diphenhydramine HCl) 25 mg oral tablet: 1 tab(s) orally every 6 hours, As Needed -for mild pain - for itching   atorvastatin 40 mg oral tablet: 1 tab(s) orally once a day (at bedtime)  butalbital/acetaminophen/caffeine 50 mg-300 mg-40 mg oral capsule: 1 cap(s) orally every 6 hours, As needed, migraine MDD:4  Flonase 50 mcg/inh nasal spray: 1 spray(s) nasal once a day  glucose tablets: Follow instructions on bottle when sugar is low.  HumaLOG KwikPen 100 units/mL injectable solution: Supplement for sugars &gt;200 AFTER U-500:  200-249 administer 5 units  250-299 administer 10 units  300-350 administer 15 units  &gt;350 administer 20 units    Maximum daily allowance 60 units/day  ibuprofen 800 mg oral tablet: 1 tab(s) orally every 12 hours, As needed, Moderate Pain (4 - 6) MDD:2  Insulin Pen Needles, 4mm: 1 application subcutaneously 4 times a day. ** Use with insulin pen **   losartan 50 mg oral tablet: 1 tab(s) orally once a day MDD:1  metFORMIN 1000 mg oral tablet: 1 tab(s) orally 2 times a day  Norvasc 10 mg oral tablet: 1 tab(s) orally once a day  nystatin 100,000 units/g topical powder: 1 application topically every 8 hours  pantoprazole 40 mg oral delayed release tablet: 1 tab(s) orally once a day MDD:1

## 2020-11-02 NOTE — PROGRESS NOTE ADULT - PROBLEM SELECTOR PLAN 9
On PRN benadryl and Flonase   -c/w Benadryl and Flonase F: none  E: replete K <4, Mg <2  N: DASH/TLC/CC   GI Ppx: None   DVT Ppx: Lovenox   Code status: FULL   Dispo: F

## 2020-11-02 NOTE — DISCHARGE NOTE PROVIDER - NSDCFUADDAPPT_GEN_ALL_CORE_FT
1. We scheduled an appointment with Dr. Nisha Joseph on November 17th @ 1:00 PM to review your diabetes regimen. Please keep a log of your morning, pre-meal, and bedtime sugars and bring it to your visit. The office can be reached at [phone].    2. Please make it to your appointment with Dr. Shari Luna (neurology) on November 18th @ 3:00 PM regarding your headaches. The office can be reached at [phone].    3. Please schedule an appointment with an ophthalmologist at the [facility] ([facility]) on [address]. The office can be reached at [phone].

## 2020-11-02 NOTE — PROGRESS NOTE ADULT - ASSESSMENT
39 y/o Female PMHx Type 2 DM, HTN, asthma, migraines, morbid obesity who presents with elevated BP and FSG, found to have FSG of 486, /122 with blurry vision and lactate of 3.5. admitted for hypertensive urgency and hyperglycemia with need for further management. 39 y/o Female PMHx Type 2 DM, HTN, asthma, migraines, morbid obesity who presents with elevated BP and FSG, found to have FSG of 486, /122 with blurry vision and lactate of 3.5. Admitted for hypertensive urgency and hyperglycemia with need for further management.

## 2020-11-02 NOTE — DISCHARGE NOTE PROVIDER - NSDCFUSCHEDAPPT_GEN_ALL_CORE_FT
HASMUKH AGUILERA ; 12/14/2020 ; [facility] [address] [facility] ; 11/17/2020 ; [facility] [address] [facility] ; 11/18/2020 ; [facility] [address] [facility] ; 12/14/2020 ; [facility] [address]

## 2020-11-02 NOTE — DISCHARGE NOTE PROVIDER - NSFOLLOWUPCLINICS_GEN_ALL_ED_FT
[facility] - Ophthalmology Clinic  Ophthalmology  [address]  [place], [place] [address]  Phone: [phone]  Fax:   Follow Up Time:

## 2020-11-02 NOTE — DISCHARGE NOTE PROVIDER - PROVIDER TOKENS
PROVIDER:[TOKEN:[[id number]],SCHEDULEDAPPT:[11/18/2020],SCHEDULEDAPPTTIME:[03:00 PM]],PROVIDER:[TOKEN:[[id number]],SCHEDULEDAPPT:[11/17/2020],SCHEDULEDAPPTTIME:[01:00 PM]]

## 2020-11-02 NOTE — PROGRESS NOTE ADULT - PROBLEM SELECTOR PLAN 3
Found to have lactate of 3.5. S/p 2L S, repeat lactate 2.5. Pt afebrile, only tachycardic to 120 (EKG w/ sinus tach), no wbcs, no signs/symptoms of infection at this time. UA neg. May be in setting of elevated BP   - AM lactate 2.3, no need to trend at this time Reports blurry vision, likely in setting of uncontrolled DM and hypertensive urgency. Reports last saw optho 1 year ago and was told there was some abnormality but pt unsure. Does wear glasses. On exam pt unable to read sign within 10ft.   - Patient admits to not seeing ophthalmologist in over one year.   - Recommend outpatient follow-up   - f/u ESR, CRP to r/o vasculitis

## 2020-11-02 NOTE — CONSULT NOTE ADULT - SUBJECTIVE AND OBJECTIVE BOX
41yo female with PMH of poorly controlled IDDM2, HTN, asthma, migraines, morbid obesity admitted for elevated BP and hyperglycemic. Ophthalmology consulted for blurry vision OU. As per patient, blurry vision started concurrently with elevated FS of  >600 (unreadable) and BP of 250s/110s nine days ago. States vision was significantly reduced for 3 days and has slowly improved, but not at baseline. Prescription glasses not helping. Denies head/ocular trauma, ocular pain, flashes or floaters.     A1c 14.3  MR head negative    POH: saw an optom 1 year ago and told there was a streak of blood OS, lost to f/u, bifocal glasses    NVacc: OD 20/100- PH 20/25-2               OS 20/100- PH 20/30+2  Pupils: RRL, no APD OU  EOM: full OU  CVF: full OU    SLE  L/L/L: makeup debris OU  C/S: quiet OU  K: clear OU  AC: deep and quiet OU  I: flat, reactive, no NVI OU    Denise unable, squeezing and poor posture; Tpen 20/17 (squeezing)    DFE Trop 1% OU  Lens: trace-1+ NS OU; scattered cortical opacities OS  Vtr: clear OU  ON: 0.25/0.3, sharp and pink OU  M: +LR, mottling OU  V: arteriole attenuation, mild AV nicking Ou  P: intact 360 OU, lattice temporally OS, small pinpoint RPE hypertrophy midperiphery along SN arcade OS

## 2020-11-02 NOTE — CONSULT NOTE ADULT - ATTENDING COMMENTS
I was physically present for the key portions of the evaluation and managemnent (E/M) service provided.  I agree with the above history, physical, and plan which I have reviewed and edited where appropriate, with the exceptions as per my note.    Pt seen and examined on 11/3.    Toradol didn't help. Pt reports that prior migraines have been more severe and often helped by fioricet, so would recommend to try that.    MRI brain unrevealing - reviewed personally.    neuro exam  perrl, eomi, face symm, tup ml, no nyst.  5/5 throughout except definite giveway in the L foot.  sens to LT intact throughout  dtrs hypo throughout, down toes.  ftn, KIRA intact.    AP: severe intractable HA, unclear etiology, possibly migrainous triggered by recent hyperglycemia/HTN.    - check MRV to r/o VST, MRA to r/o aneurysm. Low suspicion for both at this time but important to r/o.  - pain control with fioricet, ? tramadol. would consider medrol dose pack but could exacerbate DM.  - appreciate ophtho for visual disturbance.  - will follow

## 2020-11-02 NOTE — PROGRESS NOTE ADULT - PROBLEM SELECTOR PLAN 6
Reports h/o migraines, not on any medication. HAs may also be in setting of hypertensive urgency. S/p Tylenol/Ibuprofen in ED.   - added PRN Fioricet for headaches   - Monitor BP, management as above  - Ordered MRI head, f/u results  - should symptoms not improve with glycemic control can consider neuro consult to r/o pseudotumor cerebri Reports pain at  scar. Scar is located at the site of abdominal skin folds. Scar is tender to palpation but otherwise non-erythematous/non-edematous and without discharge. Uses Nystatin powder at home   - c/w Nystatin powder

## 2020-11-02 NOTE — CONSULT NOTE ADULT - SUBJECTIVE AND OBJECTIVE BOX
NEUROLOGY INITIAL CONSULT NOTE    CHIEF COMPLAINT:      HPI:  Patient is 41yo F PMH Type 2 DM, HTN, asthma, migraines, morbid obesity presenting for elevated BP and elevated FSG. Pt report for the past week her BP has been 255/133 and her FSG has been >600. Also reports blurry vision, frequent headaches, chest discomfort/pressure with pain in her left arm. She saw her endocrinologist Dr. Joseph 1.5 weeks ago who started her on U-500 65 TID as her HgbA1c was near 16. Pt also saw her PMD 3 days ago who was concern about the blurry vision and wanted the pt to be ruled out for a stroke however pt was unable to go to the ED since she had no one to watch her children. Her PMD started her on new BP meds but the pt does not remember the names. She reports 4 days of malodor from her  scar. On ROS pt says she has ongoing polydipsia, NBNB vomiting x1; denies fevers, chills, HA, chest pain, sob, abdominal pain, diarrhea, constipation, dysuria. Of note she recently finished Abx for a urine cx that grew staph, follows with Dr. Kauffman.     ED Course   VS: 98.3, 120, 184/122, 20, 98%  Labs: Na 133, HCO3- 21, Lactate 3.5->2.5, Glu 486, Alb 3.2, Alk phos 136, Ast 58, UA >1000  Imaging: CTH neg, EKG sinus tach w/ q waves in leads v3-v6   Received: 2L NS, Tylenol, 2ou Reg insulin, Lopressor 5mg       (2020 00:03)      PAST MEDICAL & SURGICAL HISTORY:  Pre-eclampsia    Abdominal hernia    Obesity    Diabetes    Hyperlipidemia    Essential hypertension    H/O exploratory laparotomy    H/O ventral hernia repair    H/O:     History of D&amp;C    H/O LEEP        REVIEW OF SYSTEMS:  As per HPI, otherwise negative for Constitutional, Eyes, Ears/Nose/Mouth/Throat, Neck, Cardiovascular, Respiratory, Gastrointestinal, Genitourinary, Skin, Endocrine, Musculoskeletal, Psychiatric, and Hematologic/Lymphatic.    MEDICATIONS  (STANDING):  amLODIPine   Tablet 10 milliGRAM(s) Oral daily  atorvastatin 40 milliGRAM(s) Oral at bedtime  dextrose 5%. 1000 milliLiter(s) (50 mL/Hr) IV Continuous <Continuous>  dextrose 50% Injectable 12.5 Gram(s) IV Push once  dextrose 50% Injectable 25 Gram(s) IV Push once  dextrose 50% Injectable 25 Gram(s) IV Push once  enoxaparin Injectable 40 milliGRAM(s) SubCutaneous every 12 hours  fluticasone propionate 50 MICROgram(s)/spray Nasal Spray 1 Spray(s) Both Nostrils every 12 hours  insulin glargine Injectable (LANTUS) 40 Unit(s) SubCutaneous <User Schedule>  insulin lispro (ADMELOG) corrective regimen sliding scale   SubCutaneous Before meals and at bedtime  insulin lispro Injectable (ADMELOG) 35 Unit(s) SubCutaneous three times a day before meals  losartan 50 milliGRAM(s) Oral daily  nystatin Powder 1 Application(s) Topical two times a day    MEDICATIONS  (PRN):  dextrose 40% Gel 15 Gram(s) Oral once PRN Blood Glucose LESS THAN 70 milliGRAM(s)/deciliter  diphenhydrAMINE 25 milliGRAM(s) Oral every 6 hours PRN Rash and/or Itching  glucagon  Injectable 1 milliGRAM(s) IntraMuscular once PRN Glucose LESS THAN 70 milligrams/deciliter  ibuprofen  Tablet. 800 milliGRAM(s) Oral two times a day PRN Moderate Pain (4 - 6), Severe Pain (7 - 10)      Allergies    amoxicillin (Unknown)  clindamycin (Pruritus)  iodine containing compounds (Hives; Anaphylaxis)  penicillin (Hives)  shellfish. (Hives; Anaphylaxis)    Intolerances    Bactrim I.V. (Rash (Mod to Severe))      FAMILY HISTORY:  FH: hypertension  father and mother    FH: diabetes mellitus  father and mother        SOCIAL HISTORY:  Living Situation:  Occupation:  Tobacco:  Alcohol:   Drug use:      VITAL SIGNS:  Vital Signs Last 24 Hrs  T(C): 36.7 (2020 12:52), Max: 36.7 (2020 20:42)  T(F): 98 (2020 12:52), Max: 98.1 (2020 20:42)  HR: 92 (2020 12:52) (69 - 132)  BP: 136/88 (2020 12:52) (133/79 - 162/104)  BP(mean): --  RR: 18 (2020 12:52) (18 - 18)  SpO2: 98% (2020 12:52) (98% - 99%)    PHYSICAL EXAMINATION:  Constitutional: WDWN; NAD  Eyes: Conjunctiva and sclera clear.  Cardiovascular: Regular rate and rhythm; S1 and S2 Normal; No murmurs, gallops or rubs.  Neurologic:  - Mental Status:  AAOx3; speech is fluent with intact naming. Good overall fund of knowledge.  - Cranial Nerves II-XII:    II:  Visual acuity decreased bilaterally; Visual fields are full to confrontation;   III, IV, VI:  Extraocular movements are intact without nystagmus.  V:  Facial sensation is intact in the V1-V3 distribution bilaterally.  VII:  Face is symmetric with normal eye closure and smile  VIII:  Hearing is intact to finger rub.  IX, X:  Uvula is midline and soft palate rises symmetrically  XI:  Head turning and shoulder shrug are intact.  XII:  Tongue protrudes in the midline.  - Motor:  Strength is 4/5 in LUE, RLE, and 5/5 throughout everywhere else.  There is no pronator drift.  Normal muscle bulk and tone throughout.  - Reflexes:  2+ and symmetric at the biceps, triceps, brachioradialis, knees, and ankles.  Plantar responses flexor.  - Sensory:  Intact to light touch, pin prick, vibration, and joint-position sense throughout.  - Coordination:  Finger-nose-finger and heel-knee-shin intact without dysmetria.  Rapid alternating hand movements intact.  - Gait: deferred    LABS:                        13.4   8.90  )-----------( 306      ( 2020 06:59 )             41.9     11-02    134<L>  |  100  |  10  ----------------------------<  276<H>  4.1   |  22  |  0.48<L>    Ca    8.9      2020 06:59  Phos  3.0     11-02  Mg     1.8     11-    TPro  6.5  /  Alb  3.2<L>  /  TBili  0.3  /  DBili  x   /  AST  37  /  ALT  33  /  AlkPhos  117  11-02      Urinalysis Basic - ( 31 Oct 2020 19:58 )    Color: Yellow / Appearance: Clear / S.010 / pH: x  Gluc: x / Ketone: NEGATIVE  / Bili: Negative / Urobili: 0.2 E.U./dL   Blood: x / Protein: NEGATIVE mg/dL / Nitrite: NEGATIVE   Leuk Esterase: NEGATIVE / RBC: x / WBC x   Sq Epi: x / Non Sq Epi: x / Bacteria: x        RADIOLOGY & ADDITIONAL STUDIES:      IMPRESSION & RECOMMENDATIONS:

## 2020-11-02 NOTE — CONSULT NOTE ADULT - ASSESSMENT
40F PMH Type 2 DM, HTN, asthma, migraines, morbid obesity who presents with elevated BP and FSG, found to have FSG of 486, /122 with blurry vision and lactate of 3.5. Admitted for hypertensive urgency and hyperglycemia. Neurology consulted for headache and blurry vision. MRI brain and CT head unremarkable.    Recommendations:  Headaches less likely migrainous in nature given bilateral headache and constant presence. Blurry vision can be attributed to diabetic retinopathy in the setting of elevated blood sugars.  - obtain opthalmology consult  - Pain control for headache as per primary team, can recommend IV Toradol PRN    Plan discussed with Dr. Luna and Primary Team. Neurology will continue to follow

## 2020-11-03 LAB
ALBUMIN SERPL ELPH-MCNC: 3.3 G/DL — SIGNIFICANT CHANGE UP (ref 3.3–5)
ALP SERPL-CCNC: 115 U/L — SIGNIFICANT CHANGE UP (ref 40–120)
ALT FLD-CCNC: 28 U/L — SIGNIFICANT CHANGE UP (ref 10–45)
ANION GAP SERPL CALC-SCNC: 10 MMOL/L — SIGNIFICANT CHANGE UP (ref 5–17)
AST SERPL-CCNC: 32 U/L — SIGNIFICANT CHANGE UP (ref 10–40)
BASOPHILS # BLD AUTO: 0.03 K/UL — SIGNIFICANT CHANGE UP (ref 0–0.2)
BASOPHILS NFR BLD AUTO: 0.3 % — SIGNIFICANT CHANGE UP (ref 0–2)
BILIRUB SERPL-MCNC: 0.2 MG/DL — SIGNIFICANT CHANGE UP (ref 0.2–1.2)
BUN SERPL-MCNC: 12 MG/DL — SIGNIFICANT CHANGE UP (ref 7–23)
CALCIUM SERPL-MCNC: 8.8 MG/DL — SIGNIFICANT CHANGE UP (ref 8.4–10.5)
CHLORIDE SERPL-SCNC: 101 MMOL/L — SIGNIFICANT CHANGE UP (ref 96–108)
CO2 SERPL-SCNC: 23 MMOL/L — SIGNIFICANT CHANGE UP (ref 22–31)
CREAT SERPL-MCNC: 0.53 MG/DL — SIGNIFICANT CHANGE UP (ref 0.5–1.3)
EOSINOPHIL # BLD AUTO: 0.19 K/UL — SIGNIFICANT CHANGE UP (ref 0–0.5)
EOSINOPHIL NFR BLD AUTO: 2 % — SIGNIFICANT CHANGE UP (ref 0–6)
GLUCOSE BLDC GLUCOMTR-MCNC: 227 MG/DL — HIGH (ref 70–99)
GLUCOSE BLDC GLUCOMTR-MCNC: 277 MG/DL — HIGH (ref 70–99)
GLUCOSE BLDC GLUCOMTR-MCNC: 297 MG/DL — HIGH (ref 70–99)
GLUCOSE BLDC GLUCOMTR-MCNC: 326 MG/DL — HIGH (ref 70–99)
GLUCOSE SERPL-MCNC: 260 MG/DL — HIGH (ref 70–99)
HCT VFR BLD CALC: 42.3 % — SIGNIFICANT CHANGE UP (ref 34.5–45)
HGB BLD-MCNC: 13.5 G/DL — SIGNIFICANT CHANGE UP (ref 11.5–15.5)
IMM GRANULOCYTES NFR BLD AUTO: 0.8 % — SIGNIFICANT CHANGE UP (ref 0–1.5)
LYMPHOCYTES # BLD AUTO: 2.81 K/UL — SIGNIFICANT CHANGE UP (ref 1–3.3)
LYMPHOCYTES # BLD AUTO: 29 % — SIGNIFICANT CHANGE UP (ref 13–44)
MAGNESIUM SERPL-MCNC: 1.7 MG/DL — SIGNIFICANT CHANGE UP (ref 1.6–2.6)
MCHC RBC-ENTMCNC: 27.4 PG — SIGNIFICANT CHANGE UP (ref 27–34)
MCHC RBC-ENTMCNC: 31.9 GM/DL — LOW (ref 32–36)
MCV RBC AUTO: 85.8 FL — SIGNIFICANT CHANGE UP (ref 80–100)
MONOCYTES # BLD AUTO: 0.68 K/UL — SIGNIFICANT CHANGE UP (ref 0–0.9)
MONOCYTES NFR BLD AUTO: 7 % — SIGNIFICANT CHANGE UP (ref 2–14)
NEUTROPHILS # BLD AUTO: 5.91 K/UL — SIGNIFICANT CHANGE UP (ref 1.8–7.4)
NEUTROPHILS NFR BLD AUTO: 60.9 % — SIGNIFICANT CHANGE UP (ref 43–77)
NRBC # BLD: 0 /100 WBCS — SIGNIFICANT CHANGE UP (ref 0–0)
PHOSPHATE SERPL-MCNC: 3.3 MG/DL — SIGNIFICANT CHANGE UP (ref 2.5–4.5)
PLATELET # BLD AUTO: 300 K/UL — SIGNIFICANT CHANGE UP (ref 150–400)
POTASSIUM SERPL-MCNC: 5.1 MMOL/L — SIGNIFICANT CHANGE UP (ref 3.5–5.3)
POTASSIUM SERPL-SCNC: 5.1 MMOL/L — SIGNIFICANT CHANGE UP (ref 3.5–5.3)
PROT SERPL-MCNC: 6.8 G/DL — SIGNIFICANT CHANGE UP (ref 6–8.3)
RBC # BLD: 4.93 M/UL — SIGNIFICANT CHANGE UP (ref 3.8–5.2)
RBC # FLD: 14.3 % — SIGNIFICANT CHANGE UP (ref 10.3–14.5)
SODIUM SERPL-SCNC: 134 MMOL/L — LOW (ref 135–145)
WBC # BLD: 9.7 K/UL — SIGNIFICANT CHANGE UP (ref 3.8–10.5)
WBC # FLD AUTO: 9.7 K/UL — SIGNIFICANT CHANGE UP (ref 3.8–10.5)

## 2020-11-03 PROCEDURE — 99223 1ST HOSP IP/OBS HIGH 75: CPT

## 2020-11-03 PROCEDURE — 99232 SBSQ HOSP IP/OBS MODERATE 35: CPT | Mod: GC

## 2020-11-03 PROCEDURE — 99233 SBSQ HOSP IP/OBS HIGH 50: CPT | Mod: GC

## 2020-11-03 PROCEDURE — 70544 MR ANGIOGRAPHY HEAD W/O DYE: CPT | Mod: 26

## 2020-11-03 RX ORDER — INSULIN LISPRO 100/ML
55 VIAL (ML) SUBCUTANEOUS
Refills: 0 | Status: DISCONTINUED | OUTPATIENT
Start: 2020-11-03 | End: 2020-11-04

## 2020-11-03 RX ORDER — INSULIN GLARGINE 100 [IU]/ML
65 INJECTION, SOLUTION SUBCUTANEOUS
Refills: 0 | Status: DISCONTINUED | OUTPATIENT
Start: 2020-11-03 | End: 2020-11-04

## 2020-11-03 RX ORDER — IBUPROFEN 200 MG
800 TABLET ORAL EVERY 12 HOURS
Refills: 0 | Status: DISCONTINUED | OUTPATIENT
Start: 2020-11-03 | End: 2020-11-04

## 2020-11-03 RX ADMIN — Medication 800 MILLIGRAM(S): at 09:34

## 2020-11-03 RX ADMIN — INSULIN GLARGINE 50 UNIT(S): 100 INJECTION, SOLUTION SUBCUTANEOUS at 09:13

## 2020-11-03 RX ADMIN — ATORVASTATIN CALCIUM 40 MILLIGRAM(S): 80 TABLET, FILM COATED ORAL at 22:18

## 2020-11-03 RX ADMIN — Medication 40 UNIT(S): at 12:23

## 2020-11-03 RX ADMIN — ENOXAPARIN SODIUM 40 MILLIGRAM(S): 100 INJECTION SUBCUTANEOUS at 05:03

## 2020-11-03 RX ADMIN — Medication 1 CAPSULE(S): at 17:39

## 2020-11-03 RX ADMIN — LOSARTAN POTASSIUM 50 MILLIGRAM(S): 100 TABLET, FILM COATED ORAL at 05:02

## 2020-11-03 RX ADMIN — Medication 15 MILLIGRAM(S): at 05:03

## 2020-11-03 RX ADMIN — ENOXAPARIN SODIUM 40 MILLIGRAM(S): 100 INJECTION SUBCUTANEOUS at 17:39

## 2020-11-03 RX ADMIN — Medication 6: at 12:23

## 2020-11-03 RX ADMIN — Medication 800 MILLIGRAM(S): at 22:18

## 2020-11-03 RX ADMIN — AMLODIPINE BESYLATE 10 MILLIGRAM(S): 2.5 TABLET ORAL at 05:03

## 2020-11-03 RX ADMIN — Medication 8: at 22:18

## 2020-11-03 RX ADMIN — Medication 40 UNIT(S): at 09:12

## 2020-11-03 RX ADMIN — Medication 6: at 09:13

## 2020-11-03 RX ADMIN — Medication 800 MILLIGRAM(S): at 10:27

## 2020-11-03 RX ADMIN — INSULIN GLARGINE 65 UNIT(S): 100 INJECTION, SOLUTION SUBCUTANEOUS at 22:19

## 2020-11-03 RX ADMIN — Medication 800 MILLIGRAM(S): at 23:18

## 2020-11-03 RX ADMIN — NYSTATIN CREAM 1 APPLICATION(S): 100000 CREAM TOPICAL at 09:43

## 2020-11-03 RX ADMIN — Medication 25 MILLIGRAM(S): at 22:18

## 2020-11-03 RX ADMIN — Medication 4: at 17:27

## 2020-11-03 NOTE — PROGRESS NOTE ADULT - PROBLEM SELECTOR PLAN 4
Reports h/o migraines, not on any medication. HAs may also be in setting of hypertensive urgency. S/p Tylenol/Ibuprofen in ED.   - adjusted pain regimen: Fioricet (mild), ibuprofen 400 mg (moderate), oxycodone (severe)   - MRI and CT head unremarkable for pathology  - will r/o SAH with MR venogram and MR angiogram.

## 2020-11-03 NOTE — PROGRESS NOTE ADULT - PROBLEM SELECTOR PLAN 3
Reports blurry vision, likely in setting of uncontrolled DM and hypertensive urgency. Reports last saw optho 1 year ago and was told there was some abnormality but pt unsure. Does wear glasses. On exam pt unable to read sign within 10ft.   - Patient admits to not seeing ophthalmologist in over one year.   - ESR and CRP levels unremarkable for GCA consideration  - Per ophtho likely 2/2 refractive error due to acutely elevated serum glucose. Pt will not benefit from a new pair of glasses as refractive error may shift secondary to fluctuating serum glucose

## 2020-11-03 NOTE — PROGRESS NOTE ADULT - PROBLEM SELECTOR PLAN 5
Pt w/ chronic left upper arm and left lower ext weakness, also reports dropping objects w/ left hand; negative CTH , possibly diabetic neuropathy related vs other neurologic process.   - TSH wnl   - Left shoulder XR negative for fracture and dislocation  - continue to monitor

## 2020-11-03 NOTE — PROGRESS NOTE ADULT - SUBJECTIVE AND OBJECTIVE BOX
NEUROLOGY CONSULT PROGRESS NOTE    INTERVAL HISTORY: Patient seen and examined at bedside. She appears uncomfortable with an ice pack and towel on her forehead covering her eyes. She is still complaining of blurry vision and a headache. She says the headache gets relieved with pain medications for an hour before starting up again. She describes the headache as pressure-like behind her eyes. She says it is not as bad as er migraines. Otherwise, she denies any lightheadedness, dizziness, chest pain, n/v/d/c, edema.    REVIEW OF SYSTEMS:  As per HPI, otherwise negative for Constitutional, Eyes, Ears/Nose/Mouth/Throat, Neck, Cardiovascular, Respiratory, Gastrointestinal, Genitourinary, Skin, Endocrine, Musculoskeletal, Psychiatric, and Hematologic/Lymphatic.    MEDICATIONS:  amLODIPine   Tablet 10 milliGRAM(s) Oral daily  atorvastatin 40 milliGRAM(s) Oral at bedtime  dextrose 40% Gel 15 Gram(s) Oral once PRN  dextrose 5%. 1000 milliLiter(s) IV Continuous <Continuous>  dextrose 50% Injectable 12.5 Gram(s) IV Push once  dextrose 50% Injectable 25 Gram(s) IV Push once  dextrose 50% Injectable 25 Gram(s) IV Push once  diphenhydrAMINE 25 milliGRAM(s) Oral every 6 hours PRN  enoxaparin Injectable 40 milliGRAM(s) SubCutaneous every 12 hours  fluticasone propionate 50 MICROgram(s)/spray Nasal Spray 1 Spray(s) Both Nostrils every 12 hours  glucagon  Injectable 1 milliGRAM(s) IntraMuscular once PRN  ibuprofen  Tablet. 800 milliGRAM(s) Oral every 12 hours PRN  insulin glargine Injectable (LANTUS) 50 Unit(s) SubCutaneous <User Schedule>  insulin lispro (ADMELOG) corrective regimen sliding scale   SubCutaneous Before meals and at bedtime  insulin lispro Injectable (ADMELOG) 40 Unit(s) SubCutaneous three times a day before meals  losartan 50 milliGRAM(s) Oral daily  nystatin Powder 1 Application(s) Topical two times a day    VITAL SIGNS:  Vital Signs Last 24 Hrs  T(C): 36.8 (03 Nov 2020 05:58), Max: 36.9 (02 Nov 2020 20:45)  T(F): 98.3 (03 Nov 2020 05:58), Max: 98.4 (02 Nov 2020 20:45)  HR: 91 (03 Nov 2020 05:58) (91 - 103)  BP: 138/80 (03 Nov 2020 05:58) (138/80 - 154/95)  BP(mean): --  RR: 18 (03 Nov 2020 05:58) (18 - 18)  SpO2: 98% (03 Nov 2020 05:58) (98% - 98%)    PHYSICAL EXAMINATION:  Constitutional: obese, in mild distress  Eyes: anicteric sclera  Cardiovascular: +S1/S2, RRR; no M/R/G  Neurologic:  - Mental Status:  AAOx3; speech is fluent with intact naming, repetition, and comprehension  - Cranial Nerves II-XII:    II:  PERRLA; visual fields are full to confrontation  III, IV, VI:  EOMI, no nystagmus  V:  facial sensation is intact in the V1-V3 distribution bilaterally.  VII:  face is symmetric with normal eye closure and smile  VIII:  hearing is intact to finger rub  IX, X:  uvula is midline and soft palate rises symmetrically  XI:  head turning and shoulder shrug are intact bilaterally  XII:  tongue protrudes in the midline  - Motor:  strength is 5/5 throughout; normal muscle bulk and tone throughout; no pronator drift  - Reflexes:  2+ and symmetric at the biceps, triceps, brachioradialis, knees, and ankles;  plantar reflexes downgoing bilaterally  - Sensory:  intact to light touch, pin prick, vibration, and joint-position sense throughout  - Coordination:  finger-nose-finger and heel-knee-shin intact without dysmetria; rapid alternating hand movements intact  - Gait: deferred    LABS:                          13.5   9.70  )-----------( 300      ( 03 Nov 2020 07:49 )             42.3     11-03    134<L>  |  101  |  12  ----------------------------<  260<H>  5.1   |  23  |  0.53    Ca    8.8      03 Nov 2020 07:48  Phos  3.3     11-03  Mg     1.7     11-03    TPro  6.8  /  Alb  3.3  /  TBili  0.2  /  DBili  x   /  AST  32  /  ALT  28  /  AlkPhos  115  11-03          RADIOLOGY & ADDITIONAL STUDIES:      IMPRESSION & RECOMMENDATIONS:

## 2020-11-03 NOTE — PROGRESS NOTE ADULT - PROBLEM SELECTOR PLAN 5
Pt w/ chronic left upper arm and left lower ext weakness, also reports dropping objects w/ left hand; negative CTH , possibly diabetic neuropathy related vs other neurologic process.   - TSH wnl   - Left shoulder XR negative for fracture and dislocation

## 2020-11-03 NOTE — PROGRESS NOTE ADULT - PROBLEM SELECTOR PROBLEM 8
Patient is calling again in regards to her refill request hasn't been filled.  Patient is asking for a return call to the above number to also discuss a medication that was suggest to her at the last appt.   Allergies

## 2020-11-03 NOTE — PROGRESS NOTE ADULT - ASSESSMENT
40F PMH Type 2 DM, HTN, asthma, migraines, morbid obesity who presents with elevated BP and FSG, found to have FSG of 486, /122 with blurry vision and lactate of 3.5. Admitted for hypertensive urgency and hyperglycemia. Neurology consulted for headache and blurry vision. MRI brain and CT head unremarkable.    Recommendations:  Headaches can be a form of atypical migraines given bilateral headache and constant presence.  - obtain MRA (to rule out SAH, although very low likely), obtain MRV (to rule out venous sinus thrombosis in the setting of headache with blurry vision)  - appreciate opthalmology recs  - Fioricet PRN for headaches  - BP and glycemic control as per primary team.    Plan discussed with Dr. Luna and Primary Team. Neurology will continue to follow

## 2020-11-03 NOTE — PROGRESS NOTE ADULT - SUBJECTIVE AND OBJECTIVE BOX
INTERVAL HPI/OVERNIGHT EVENTS:  Yesterday afternoon ophthalmology evaluated patient and deemed blurry vision likely in setting of poorly controlled diabetes, no acute interventions at this time. Increased Lantus to 50U BID and admelog 40U TID pre-meal. AM . Patient seen and examined at bedside. Says her headaches improve with ibuprofen 800 mg instead of toradol IVP. Says she is still drinking more water than usual. Patient denies fever, chills, CP, SOB, N/V/D/C, changes in bowel movements, LE swelling.     VITALS  Vital Signs Last 24 Hrs  T(C): 36.8 (03 Nov 2020 05:58), Max: 36.9 (02 Nov 2020 20:45)  T(F): 98.3 (03 Nov 2020 05:58), Max: 98.4 (02 Nov 2020 20:45)  HR: 91 (03 Nov 2020 05:58) (91 - 103)  BP: 138/80 (03 Nov 2020 05:58) (138/80 - 154/95)  BP(mean): --  RR: 18 (03 Nov 2020 05:58) (18 - 18)  SpO2: 98% (03 Nov 2020 05:58) (98% - 98%)    CAPILLARY BLOOD GLUCOSE      POCT Blood Glucose.: 297 mg/dL (03 Nov 2020 12:18)  POCT Blood Glucose.: 277 mg/dL (03 Nov 2020 08:18)  POCT Blood Glucose.: 285 mg/dL (02 Nov 2020 21:47)  POCT Blood Glucose.: 306 mg/dL (02 Nov 2020 17:18)      PHYSICAL EXAM  General: Obese female lying in bed with handkerchief on forehead  HEENT: PERRL/ EOMI, no scleral icterus, MMM  Neck: Supple; no JVD  Respiratory: CTA B/L, no wheezes/crackles   Cardiovascular: Regular rhythm/rate; +S1 +S2  Gastrointestinal: Obese abdomen, mildly TTP in lower abdomen, normoactive BS, no rebound, no guarding, no suprapubic tenderness  Extremities: WWP, no cyanosis, no clubbing, no edema, pulses equal  Neurological: A&Ox3, no gross focal deficits, follows commands  Skin: Normal temperature, warm, dry    MEDICATIONS  (STANDING):  amLODIPine   Tablet 10 milliGRAM(s) Oral daily  atorvastatin 40 milliGRAM(s) Oral at bedtime  dextrose 5%. 1000 milliLiter(s) (50 mL/Hr) IV Continuous <Continuous>  dextrose 50% Injectable 12.5 Gram(s) IV Push once  dextrose 50% Injectable 25 Gram(s) IV Push once  dextrose 50% Injectable 25 Gram(s) IV Push once  enoxaparin Injectable 40 milliGRAM(s) SubCutaneous every 12 hours  fluticasone propionate 50 MICROgram(s)/spray Nasal Spray 1 Spray(s) Both Nostrils every 12 hours  insulin glargine Injectable (LANTUS) 50 Unit(s) SubCutaneous <User Schedule>  insulin lispro (ADMELOG) corrective regimen sliding scale   SubCutaneous Before meals and at bedtime  insulin lispro Injectable (ADMELOG) 40 Unit(s) SubCutaneous three times a day before meals  losartan 50 milliGRAM(s) Oral daily  nystatin Powder 1 Application(s) Topical two times a day    MEDICATIONS  (PRN):  dextrose 40% Gel 15 Gram(s) Oral once PRN Blood Glucose LESS THAN 70 milliGRAM(s)/deciliter  diphenhydrAMINE 25 milliGRAM(s) Oral every 6 hours PRN Rash and/or Itching  glucagon  Injectable 1 milliGRAM(s) IntraMuscular once PRN Glucose LESS THAN 70 milligrams/deciliter  ibuprofen  Tablet. 800 milliGRAM(s) Oral every 12 hours PRN Moderate Pain (4 - 6)      amoxicillin (Unknown)  Bactrim I.V. (Rash (Mod to Severe))  clindamycin (Pruritus)  iodine containing compounds (Hives; Anaphylaxis)  penicillin (Hives)  shellfish. (Hives; Anaphylaxis)      LABS                        13.5   9.70  )-----------( 300      ( 03 Nov 2020 07:49 )             42.3     11-03    134<L>  |  101  |  12  ----------------------------<  260<H>  5.1   |  23  |  0.53    Ca    8.8      03 Nov 2020 07:48  Phos  3.3     11-03  Mg     1.7     11-03    TPro  6.8  /  Alb  3.3  /  TBili  0.2  /  DBili  x   /  AST  32  /  ALT  28  /  AlkPhos  115  11-03        CARDIAC MARKERS ( 02 Nov 2020 06:59 )  x     / <0.01 ng/mL / 39 U/L / x     / 1.3 ng/mL        RADIOLOGY & ADDITIONAL TESTS: Reviewed

## 2020-11-03 NOTE — PROGRESS NOTE ADULT - PROBLEM SELECTOR PLAN 9
F: none  E: replete K <4, Mg <2  N: DASH/TLC/CC   GI Ppx: None   DVT Ppx: Lovenox   Code status: FULL   Dispo: F

## 2020-11-03 NOTE — PROGRESS NOTE ADULT - SUBJECTIVE AND OBJECTIVE BOX
INTERVAL HPI/OVERNIGHT EVENTS:    FSG & Insulin received:    Yesterday:  pre-dinner fs  nutritional lispro 40  units +  8 units lispro SS  bedtime fs  lantus 50  units + 6   units lispro SS    Today:  pre-breakfast fs  lantus 50  units ,  nutritional lispro  40 units+  6  units lispro SS          Pt reports the following symptoms:    CONSTITUTIONAL:  Negative fever or chills, feels well, good appetite  CARDIOVASCULAR:  Negative for chest pain or palpitations  RESPIRATORY:  Negative for cough, wheezing, or SOB   GASTROINTESTINAL:  Negative for nausea, vomiting, diarrhea, constipation, or abdominal pain  GENITOURINARY:  Negative frequency, urgency or dysuria      MEDICATIONS  (STANDING):  amLODIPine   Tablet 10 milliGRAM(s) Oral daily  atorvastatin 40 milliGRAM(s) Oral at bedtime  dextrose 5%. 1000 milliLiter(s) (50 mL/Hr) IV Continuous <Continuous>  dextrose 50% Injectable 12.5 Gram(s) IV Push once  dextrose 50% Injectable 25 Gram(s) IV Push once  dextrose 50% Injectable 25 Gram(s) IV Push once  enoxaparin Injectable 40 milliGRAM(s) SubCutaneous every 12 hours  fluticasone propionate 50 MICROgram(s)/spray Nasal Spray 1 Spray(s) Both Nostrils every 12 hours  insulin glargine Injectable (LANTUS) 50 Unit(s) SubCutaneous <User Schedule>  insulin lispro (ADMELOG) corrective regimen sliding scale   SubCutaneous Before meals and at bedtime  insulin lispro Injectable (ADMELOG) 40 Unit(s) SubCutaneous three times a day before meals  losartan 50 milliGRAM(s) Oral daily  nystatin Powder 1 Application(s) Topical two times a day    MEDICATIONS  (PRN):  dextrose 40% Gel 15 Gram(s) Oral once PRN Blood Glucose LESS THAN 70 milliGRAM(s)/deciliter  diphenhydrAMINE 25 milliGRAM(s) Oral every 6 hours PRN Rash and/or Itching  glucagon  Injectable 1 milliGRAM(s) IntraMuscular once PRN Glucose LESS THAN 70 milligrams/deciliter  ibuprofen  Tablet. 800 milliGRAM(s) Oral every 12 hours PRN Moderate Pain (4 - 6)      Past medical history reviewed  Family history reviewed  Social history reviewed    PHYSICAL EXAM  Vital Signs Last 24 Hrs  T(C): 36.8 (2020 05:58), Max: 36.9 (2020 20:45)  T(F): 98.3 (2020 05:58), Max: 98.4 (2020 20:45)  HR: 91 (2020 05:58) (91 - 103)  BP: 138/80 (2020 05:58) (136/88 - 154/95)  BP(mean): --  RR: 18 (2020 05:58) (18 - 18)  SpO2: 98% (2020 05:58) (98% - 98%)      Constitutional:  in NAD.   HEENT:  no proptosis or lid retraction  Neck: no thyromegaly or palpable thyroid nodules   Respiratory: lungs CTAB.  Cardiovascular: regular rhythm, normal S1 and S2  GI: soft, NT/ND  Neurology: no tremors, DTR 2+  Skin: no visible rashes/lesions  Psychiatric: AAO x 3, normal affect/mood.      LABS:                        13.5   9.70  )-----------( 300      ( 2020 07:49 )             42.3         134<L>  |  101  |  12  ----------------------------<  260<H>  5.1   |  23  |  0.53    Ca    8.8      2020 07:48  Phos  3.3     -  Mg     1.7         TPro  6.8  /  Alb  3.3  /  TBili  0.2  /  DBili  x   /  AST  32  /  ALT  28  /  AlkPhos  115          Thyroid Stimulating Hormone, Serum: 1.367 uIU/mL ( @ 06:07)  Thyroid Stimulating Hormone, Serum: 1.782 uIU/mL ( @ 09:29)      HbA1C: 14.3 % ( @ 08:35)  12.9 % ( @ 09:29)  12.0 % ( @ 07:11)      CAPILLARY BLOOD GLUCOSE      POCT Blood Glucose.: 277 mg/dL (2020 08:18)  POCT Blood Glucose.: 285 mg/dL (2020 21:47)  POCT Blood Glucose.: 306 mg/dL (2020 17:18)    A/P: 39 y/o Female PMHx Type 2 DM, HTN, asthma, migraines, morbid obesity who presents with elevated BP and FSG, found to have FSG of 486, /122 with blurry vision and lactate of 3.5. Admitted for hypertensive urgency and hyperglycemia with need for further management. endo consulted for DM management.     1. DM type 2  A1c: 14.3%  weight: 127 kg  cr:0.4, GFR:123  - Increase Lantus  units Q12 hours  - Increase Humalog  units TIDAC + Mod Correction scale  -f/u Aldosterone/Plasma renin   - Endocrinology team will continue to follow      case seen and discussed with   and primary team updated.     for discharge please make appointment  with Dr. Nisha Joseph.               Cholesterol, Serum: 219 mg/dL (20 @ 09:29)  HDL Cholesterol, Serum: 35 mg/dL (20 @ 09:29)  Triglycerides, Serum: 184 mg/dL (20 @ 09:29)  Direct LDL: 147 mg/dL (20 @ 09:29)         INTERVAL HPI/OVERNIGHT EVENTS:  Pt was seen and examined at the bedside Pt reports [place]. Pt had opthalmology evaluation yesterday.   She ate cereal with banana for breakfast and grilled chicken salad for lunch.     FSG & Insulin received:    Yesterday:  pre-dinner fs  nutritional lispro 40  units +  8 units lispro SS  bedtime fs  lantus 50  units + 6   units lispro SS    Today:  pre-breakfast fs  lantus 50  units ,  nutritional lispro  40 units+  6  units lispro SS          Pt reports the following symptoms:    CONSTITUTIONAL:  Negative fever or chills, feels well, good appetite  CARDIOVASCULAR:  Negative for chest pain or palpitations  RESPIRATORY:  Negative for cough, wheezing, or SOB   GASTROINTESTINAL:  Negative for nausea, vomiting, diarrhea, constipation, or abdominal pain  GENITOURINARY:  Negative frequency, urgency or dysuria      MEDICATIONS  (STANDING):  amLODIPine   Tablet 10 milliGRAM(s) Oral daily  atorvastatin 40 milliGRAM(s) Oral at bedtime  dextrose 5%. 1000 milliLiter(s) (50 mL/Hr) IV Continuous <Continuous>  dextrose 50% Injectable 12.5 Gram(s) IV Push once  dextrose 50% Injectable 25 Gram(s) IV Push once  dextrose 50% Injectable 25 Gram(s) IV Push once  enoxaparin Injectable 40 milliGRAM(s) SubCutaneous every 12 hours  fluticasone propionate 50 MICROgram(s)/spray Nasal Spray 1 Spray(s) Both Nostrils every 12 hours  insulin glargine Injectable (LANTUS) 50 Unit(s) SubCutaneous <User Schedule>  insulin lispro (ADMELOG) corrective regimen sliding scale   SubCutaneous Before meals and at bedtime  insulin lispro Injectable (ADMELOG) 40 Unit(s) SubCutaneous three times a day before meals  losartan 50 milliGRAM(s) Oral daily  nystatin Powder 1 Application(s) Topical two times a day    MEDICATIONS  (PRN):  dextrose 40% Gel 15 Gram(s) Oral once PRN Blood Glucose LESS THAN 70 milliGRAM(s)/deciliter  diphenhydrAMINE 25 milliGRAM(s) Oral every 6 hours PRN Rash and/or Itching  glucagon  Injectable 1 milliGRAM(s) IntraMuscular once PRN Glucose LESS THAN 70 milligrams/deciliter  ibuprofen  Tablet. 800 milliGRAM(s) Oral every 12 hours PRN Moderate Pain (4 - 6)      Past medical history reviewed  Family history reviewed  Social history reviewed    PHYSICAL EXAM  Vital Signs Last 24 Hrs  T(C): 36.8 (2020 05:58), Max: 36.9 (2020 20:45)  T(F): 98.3 (2020 05:58), Max: 98.4 (2020 20:45)  HR: 91 (2020 05:58) (91 - 103)  BP: 138/80 (2020 05:58) (136/88 - 154/95)  BP(mean): --  RR: 18 (2020 05:58) (18 - 18)  SpO2: 98% (2020 05:58) (98% - 98%)      Constitutional:  in NAD.   HEENT:  no proptosis or lid retraction  Neck: no thyromegaly or palpable thyroid nodules   Respiratory: lungs CTAB.  Cardiovascular: regular rhythm, normal S1 and S2  GI: soft, NT/ND  Neurology: no tremors, DTR 2+  Skin: no visible rashes/lesions  Psychiatric: AAO x 3, normal affect/mood.      LABS:                        13.5   9.70  )-----------( 300      ( 2020 07:49 )             42.3         134<L>  |  101  |  12  ----------------------------<  260<H>  5.1   |  23  |  0.53    Ca    8.8      2020 07:48  Phos  3.3       Mg     1.7         TPro  6.8  /  Alb  3.3  /  TBili  0.2  /  DBili  x   /  AST  32  /  ALT  28  /  AlkPhos  115          Thyroid Stimulating Hormone, Serum: 1.367 uIU/mL ( @ 06:07)  Thyroid Stimulating Hormone, Serum: 1.782 uIU/mL ( @ 09:29)      HbA1C: 14.3 % ( @ 08:35)  12.9 % ( @ 09:29)  12.0 % ( @ 07:11)      CAPILLARY BLOOD GLUCOSE      POCT Blood Glucose.: 277 mg/dL (2020 08:18)  POCT Blood Glucose.: 285 mg/dL (2020 21:47)  POCT Blood Glucose.: 306 mg/dL (2020 17:18)    A/P: 39 y/o Female PMHx Type 2 DM, HTN, asthma, migraines, morbid obesity who presents with elevated BP and FSG, found to have FSG of 486, /122 with blurry vision and lactate of 3.5. Admitted for hypertensive urgency and hyperglycemia with need for further management. endo consulted for DM management.     1. DM type 2  A1c: 14.3%  weight: 127 kg  cr:0.4, GFR:123  - Increase Lantus to 65 units Q12 hours  - Increase Humalog to 55 units TIDAC + Mod Correction scale  -f/u Aldosterone/Plasma renin   - Endocrinology team will continue to follow    for discharge   she can be discharge on u500 80 units TID.       case seen and discussed with   and primary team updated.     for discharge please make appointment  with Dr. Nisha Joseph.               Cholesterol, Serum: 219 mg/dL (20 @ 09:29)  HDL Cholesterol, Serum: 35 mg/dL (20 @ 09:29)  Triglycerides, Serum: 184 mg/dL (20 @ 09:29)  Direct LDL: 147 mg/dL (20 @ 09:29)         INTERVAL HPI/OVERNIGHT EVENTS:  Pt was seen and examined at the bedside Pt reports [place]. Pt had opthalmology evaluation yesterday.   She ate cereal with banana for breakfast and grilled chicken salad for lunch.     FSG & Insulin received:    Yesterday:  pre-dinner fs  nutritional lispro 40  units +  8 units lispro SS  bedtime fs  lantus 50  units + 6   units lispro SS    Today:  pre-breakfast fs  lantus 50  units ,  nutritional lispro  40 units+  6  units lispro SS          Pt reports the following symptoms:    CONSTITUTIONAL:  Negative fever or chills, feels well, good appetite  CARDIOVASCULAR:  Negative for chest pain or palpitations  RESPIRATORY:  Negative for cough, wheezing, or SOB   GASTROINTESTINAL:  Negative for nausea, vomiting, diarrhea, constipation, or abdominal pain  GENITOURINARY:  Negative frequency, urgency or dysuria      MEDICATIONS  (STANDING):  amLODIPine   Tablet 10 milliGRAM(s) Oral daily  atorvastatin 40 milliGRAM(s) Oral at bedtime  dextrose 5%. 1000 milliLiter(s) (50 mL/Hr) IV Continuous <Continuous>  dextrose 50% Injectable 12.5 Gram(s) IV Push once  dextrose 50% Injectable 25 Gram(s) IV Push once  dextrose 50% Injectable 25 Gram(s) IV Push once  enoxaparin Injectable 40 milliGRAM(s) SubCutaneous every 12 hours  fluticasone propionate 50 MICROgram(s)/spray Nasal Spray 1 Spray(s) Both Nostrils every 12 hours  insulin glargine Injectable (LANTUS) 50 Unit(s) SubCutaneous <User Schedule>  insulin lispro (ADMELOG) corrective regimen sliding scale   SubCutaneous Before meals and at bedtime  insulin lispro Injectable (ADMELOG) 40 Unit(s) SubCutaneous three times a day before meals  losartan 50 milliGRAM(s) Oral daily  nystatin Powder 1 Application(s) Topical two times a day    MEDICATIONS  (PRN):  dextrose 40% Gel 15 Gram(s) Oral once PRN Blood Glucose LESS THAN 70 milliGRAM(s)/deciliter  diphenhydrAMINE 25 milliGRAM(s) Oral every 6 hours PRN Rash and/or Itching  glucagon  Injectable 1 milliGRAM(s) IntraMuscular once PRN Glucose LESS THAN 70 milligrams/deciliter  ibuprofen  Tablet. 800 milliGRAM(s) Oral every 12 hours PRN Moderate Pain (4 - 6)      Past medical history reviewed  Family history reviewed  Social history reviewed    PHYSICAL EXAM  Vital Signs Last 24 Hrs  T(C): 36.8 (2020 05:58), Max: 36.9 (2020 20:45)  T(F): 98.3 (2020 05:58), Max: 98.4 (2020 20:45)  HR: 91 (2020 05:58) (91 - 103)  BP: 138/80 (2020 05:58) (136/88 - 154/95)  BP(mean): --  RR: 18 (2020 05:58) (18 - 18)  SpO2: 98% (2020 05:58) (98% - 98%)      Constitutional:  in NAD.   HEENT:  no proptosis or lid retraction  Neck: no thyromegaly or palpable thyroid nodules   Respiratory: lungs CTAB.  Cardiovascular: regular rhythm, normal S1 and S2  GI: soft, NT/ND  Neurology: no tremors, DTR 2+  Skin: no visible rashes/lesions  Psychiatric: AAO x 3, normal affect/mood.      LABS:                        13.5   9.70  )-----------( 300      ( 2020 07:49 )             42.3         134<L>  |  101  |  12  ----------------------------<  260<H>  5.1   |  23  |  0.53    Ca    8.8      2020 07:48  Phos  3.3       Mg     1.7         TPro  6.8  /  Alb  3.3  /  TBili  0.2  /  DBili  x   /  AST  32  /  ALT  28  /  AlkPhos  115          Thyroid Stimulating Hormone, Serum: 1.367 uIU/mL ( @ 06:07)  Thyroid Stimulating Hormone, Serum: 1.782 uIU/mL ( @ 09:29)      HbA1C: 14.3 % ( @ 08:35)  12.9 % ( @ 09:29)  12.0 % ( @ 07:11)      CAPILLARY BLOOD GLUCOSE      POCT Blood Glucose.: 277 mg/dL (2020 08:18)  POCT Blood Glucose.: 285 mg/dL (2020 21:47)  POCT Blood Glucose.: 306 mg/dL (2020 17:18)    A/P: 39 y/o Female PMHx Type 2 DM, HTN, asthma, migraines, morbid obesity who presents with elevated BP and FSG, found to have FSG of 486, /122 with blurry vision and lactate of 3.5. Admitted for hypertensive urgency and hyperglycemia with need for further management. endo consulted for DM management.     1. DM type 2  A1c: 14.3%  weight: 127 kg  cr:0.4, GFR:123  - Increase Lantus to 65 units Q12 hours  - Increase Humalog to 55 units TIDAC + Mod Correction scale  -f/u Aldosterone/Plasma renin       for discharge   she can be discharge on u500 80 units TID.       case seen and discussed with   and primary team updated.     for discharge please make appointment  with Dr. Nisha Joseph.

## 2020-11-03 NOTE — PROGRESS NOTE ADULT - PROBLEM SELECTOR PLAN 2
In ED Glu 486, s/p 20u reg insulin ->313. Pt reports her recent HgbA1c at endocrinologist Dr. Joseph's office was 16. At her PMD's office a few days ago her HgbA1c was 13.9. Pt recently started on U-500 65u TID (per pt and seen in HIE note).   - In-house A1c 14.3%  - c/w Lantus 50U q12h and Lispro 40U TID   - AM   - endo consulted, appreciate recs for dispo

## 2020-11-03 NOTE — PROGRESS NOTE ADULT - PROBLEM SELECTOR PLAN 3
Reports blurry vision, likely in setting of uncontrolled DM and hypertensive urgency. Reports last saw optho 1 year ago and was told there was some abnormality but pt unsure. Does wear glasses. On exam pt unable to read sign within 10ft.   - Patient admits to not seeing ophthalmologist in over one year.   - ESR and CRP levels unremarkable for GCA consideration  - Per ophtho likely 2/2 refractive error due to acutely elevated serum glucose. Pt will not benefit from a new pair of glasses as refractive error may shift secondary to fluctuating serum glucose.

## 2020-11-03 NOTE — PROGRESS NOTE ADULT - PROBLEM SELECTOR PLAN 4
Reports h/o migraines, not on any medication. HAs may also be in setting of hypertensive urgency. S/p Tylenol/Ibuprofen in ED.   - adjusted pain regimen: Fioricet (mild), ibuprofen 400 mg (moderate), oxycodone (severe)   - MRI and CT head unremarkable for pathology  - will r/o SAH with MR venogram and MR angiogram

## 2020-11-03 NOTE — PROGRESS NOTE ADULT - SUBJECTIVE AND OBJECTIVE BOX
Patient was seen and examined by me at bedside. I agree with resident's note, subjective, objective physical exam, assessment and plan with following modifications/additions.    Greater than 35 minutes spent on total encounter; more than 50% of the visit was spent counseling and/or coordinating care by the attending physician.    41 y/o Female PMHx Type 2 DM, HTN, asthma, migraines, morbid obesity who presents with relatively acute blurry vision and headache in setting of hyperglycemia and marked HTN. both DM and HTN now controlled but patient continues to endorse severe blurry vision and headache, neg MRI, neg optho eval for emergent findings. Persistent headache this am, c/f unresolved migraine vs tension vs SAH/venous thrombosis  -try fiorcet now that pressures improved, Can use prn oxycodone for severe pain  -MRA/MRV stat per neuro r/o SAH   -appreciate endo recs, fsg stabilizing in 200's on this current regimen  -no sig esr/crp elevation GCA unlikely  -DVT px-lovenox  -Dispo- pending headache workup above and symptom control, likely tomorrow       Sai Diehl MD [phone]

## 2020-11-03 NOTE — PROGRESS NOTE ADULT - PROBLEM SELECTOR PLAN 1
Presented with /122. Pt reports for the past week her BP has been 252/156, has also reported blurry vision and migraines. Has elevated lactate to 3.5, s/p 1L NS now 2.5. Renal function wnl. S/p 5mg Lopressor in ED, repeat /88. CTH neg. On Losartan 25mg, Amlodipine 10mg at home.   - c/w losartan to 50 mg daily  - c/w amlodipine 10mg daily
Presented with /122. Pt reports for the past week her BP has been 252/156, has also reported blurry vision and migraines. Has elevated lactate to 3.5, s/p 1L NS now 2.5. Renal function wnl. S/p 5mg Lopressor in ED, repeat /88. CTH neg. On Losartan 25mg, Amlodipine 10mg at home.   - c/w losartan to 50 mg daily  - c/w amlodipine 10mg daily  - BPs well controlled today
Presented with /122. Pt reports for the past week her BP has been 252/156, has also reported blurry vision and migraines. Has elevated lactate to 3.5, s/p 1L NS now 2.5. Renal function wnl. S/p 5mg Lopressor in ED, repeat /88. CTH neg. On Losartan 25mg, Amlodipine 10mg at home.   - c/w losartan to 50 mg daily  - c/w amlodipine 10mg daily.
Presented with /122. Pt reports for the past week her BP has been 252/156, has also reported blurry vision and migraines. Has elevated lactate to 3.5, s/p 1L NS now 2.5. Renal function wnl. S/p 5mg Lopressor in ED, repeat /88. CTH neg. On Losartan 25mg, Amlodipine 10mg at home.   - increased losartan to 50 mg daily  - c/w Amlodipine 10mg   - monitor BP, consider adding third agent if not controlled

## 2020-11-04 ENCOUNTER — TRANSCRIPTION ENCOUNTER (OUTPATIENT)
Age: 40
End: 2020-11-04

## 2020-11-04 VITALS
TEMPERATURE: 98 F | RESPIRATION RATE: 20 BRPM | SYSTOLIC BLOOD PRESSURE: 137 MMHG | HEART RATE: 97 BPM | DIASTOLIC BLOOD PRESSURE: 89 MMHG | OXYGEN SATURATION: 100 %

## 2020-11-04 LAB
ALBUMIN SERPL ELPH-MCNC: 3.6 G/DL — SIGNIFICANT CHANGE UP (ref 3.3–5)
ALP SERPL-CCNC: 130 U/L — HIGH (ref 40–120)
ALT FLD-CCNC: 30 U/L — SIGNIFICANT CHANGE UP (ref 10–45)
ANION GAP SERPL CALC-SCNC: 12 MMOL/L — SIGNIFICANT CHANGE UP (ref 5–17)
AST SERPL-CCNC: 38 U/L — SIGNIFICANT CHANGE UP (ref 10–40)
BILIRUB SERPL-MCNC: 0.2 MG/DL — SIGNIFICANT CHANGE UP (ref 0.2–1.2)
BUN SERPL-MCNC: 9 MG/DL — SIGNIFICANT CHANGE UP (ref 7–23)
CALCIUM SERPL-MCNC: 9.7 MG/DL — SIGNIFICANT CHANGE UP (ref 8.4–10.5)
CHLORIDE SERPL-SCNC: 99 MMOL/L — SIGNIFICANT CHANGE UP (ref 96–108)
CO2 SERPL-SCNC: 25 MMOL/L — SIGNIFICANT CHANGE UP (ref 22–31)
CREAT SERPL-MCNC: 0.6 MG/DL — SIGNIFICANT CHANGE UP (ref 0.5–1.3)
GLUCOSE BLDC GLUCOMTR-MCNC: 178 MG/DL — HIGH (ref 70–99)
GLUCOSE BLDC GLUCOMTR-MCNC: 248 MG/DL — HIGH (ref 70–99)
GLUCOSE BLDC GLUCOMTR-MCNC: 309 MG/DL — HIGH (ref 70–99)
GLUCOSE BLDC GLUCOMTR-MCNC: 330 MG/DL — HIGH (ref 70–99)
GLUCOSE BLDC GLUCOMTR-MCNC: 378 MG/DL — HIGH (ref 70–99)
GLUCOSE BLDC GLUCOMTR-MCNC: 417 MG/DL — HIGH (ref 70–99)
GLUCOSE SERPL-MCNC: 234 MG/DL — HIGH (ref 70–99)
HCT VFR BLD CALC: 42.3 % — SIGNIFICANT CHANGE UP (ref 34.5–45)
HGB BLD-MCNC: 13.4 G/DL — SIGNIFICANT CHANGE UP (ref 11.5–15.5)
MAGNESIUM SERPL-MCNC: 1.7 MG/DL — SIGNIFICANT CHANGE UP (ref 1.6–2.6)
MCHC RBC-ENTMCNC: 26.7 PG — LOW (ref 27–34)
MCHC RBC-ENTMCNC: 31.7 GM/DL — LOW (ref 32–36)
MCV RBC AUTO: 84.3 FL — SIGNIFICANT CHANGE UP (ref 80–100)
NRBC # BLD: 0 /100 WBCS — SIGNIFICANT CHANGE UP (ref 0–0)
PHOSPHATE SERPL-MCNC: 4 MG/DL — SIGNIFICANT CHANGE UP (ref 2.5–4.5)
PLATELET # BLD AUTO: 310 K/UL — SIGNIFICANT CHANGE UP (ref 150–400)
POTASSIUM SERPL-MCNC: 4.5 MMOL/L — SIGNIFICANT CHANGE UP (ref 3.5–5.3)
POTASSIUM SERPL-SCNC: 4.5 MMOL/L — SIGNIFICANT CHANGE UP (ref 3.5–5.3)
PROT SERPL-MCNC: 7.4 G/DL — SIGNIFICANT CHANGE UP (ref 6–8.3)
RBC # BLD: 5.02 M/UL — SIGNIFICANT CHANGE UP (ref 3.8–5.2)
RBC # FLD: 14.5 % — SIGNIFICANT CHANGE UP (ref 10.3–14.5)
SODIUM SERPL-SCNC: 136 MMOL/L — SIGNIFICANT CHANGE UP (ref 135–145)
WBC # BLD: 9.65 K/UL — SIGNIFICANT CHANGE UP (ref 3.8–10.5)
WBC # FLD AUTO: 9.65 K/UL — SIGNIFICANT CHANGE UP (ref 3.8–10.5)

## 2020-11-04 PROCEDURE — 99285 EMERGENCY DEPT VISIT HI MDM: CPT | Mod: 25

## 2020-11-04 PROCEDURE — 84443 ASSAY THYROID STIM HORMONE: CPT

## 2020-11-04 PROCEDURE — U0003: CPT

## 2020-11-04 PROCEDURE — 84484 ASSAY OF TROPONIN QUANT: CPT

## 2020-11-04 PROCEDURE — 99231 SBSQ HOSP IP/OBS SF/LOW 25: CPT | Mod: GC

## 2020-11-04 PROCEDURE — 81003 URINALYSIS AUTO W/O SCOPE: CPT

## 2020-11-04 PROCEDURE — 99232 SBSQ HOSP IP/OBS MODERATE 35: CPT

## 2020-11-04 PROCEDURE — 84244 ASSAY OF RENIN: CPT

## 2020-11-04 PROCEDURE — 96361 HYDRATE IV INFUSION ADD-ON: CPT

## 2020-11-04 PROCEDURE — 70450 CT HEAD/BRAIN W/O DYE: CPT

## 2020-11-04 PROCEDURE — 85025 COMPLETE CBC W/AUTO DIFF WBC: CPT

## 2020-11-04 PROCEDURE — 83735 ASSAY OF MAGNESIUM: CPT

## 2020-11-04 PROCEDURE — 36415 COLL VENOUS BLD VENIPUNCTURE: CPT

## 2020-11-04 PROCEDURE — 71045 X-RAY EXAM CHEST 1 VIEW: CPT

## 2020-11-04 PROCEDURE — 73030 X-RAY EXAM OF SHOULDER: CPT

## 2020-11-04 PROCEDURE — 83690 ASSAY OF LIPASE: CPT

## 2020-11-04 PROCEDURE — 83036 HEMOGLOBIN GLYCOSYLATED A1C: CPT

## 2020-11-04 PROCEDURE — 99233 SBSQ HOSP IP/OBS HIGH 50: CPT | Mod: GC

## 2020-11-04 PROCEDURE — 70544 MR ANGIOGRAPHY HEAD W/O DYE: CPT

## 2020-11-04 PROCEDURE — 70553 MRI BRAIN STEM W/O & W/DYE: CPT

## 2020-11-04 PROCEDURE — 96375 TX/PRO/DX INJ NEW DRUG ADDON: CPT

## 2020-11-04 PROCEDURE — 93005 ELECTROCARDIOGRAM TRACING: CPT

## 2020-11-04 PROCEDURE — 94640 AIRWAY INHALATION TREATMENT: CPT

## 2020-11-04 PROCEDURE — 82962 GLUCOSE BLOOD TEST: CPT

## 2020-11-04 PROCEDURE — 80053 COMPREHEN METABOLIC PANEL: CPT

## 2020-11-04 PROCEDURE — 82803 BLOOD GASES ANY COMBINATION: CPT

## 2020-11-04 PROCEDURE — 85027 COMPLETE CBC AUTOMATED: CPT

## 2020-11-04 PROCEDURE — 84100 ASSAY OF PHOSPHORUS: CPT

## 2020-11-04 PROCEDURE — 82010 KETONE BODYS QUAN: CPT

## 2020-11-04 PROCEDURE — 80048 BASIC METABOLIC PNL TOTAL CA: CPT

## 2020-11-04 PROCEDURE — A9585: CPT

## 2020-11-04 PROCEDURE — 82553 CREATINE MB FRACTION: CPT

## 2020-11-04 PROCEDURE — 82088 ASSAY OF ALDOSTERONE: CPT

## 2020-11-04 PROCEDURE — 96374 THER/PROPH/DIAG INJ IV PUSH: CPT

## 2020-11-04 PROCEDURE — 83605 ASSAY OF LACTIC ACID: CPT

## 2020-11-04 PROCEDURE — 87086 URINE CULTURE/COLONY COUNT: CPT

## 2020-11-04 PROCEDURE — 86140 C-REACTIVE PROTEIN: CPT

## 2020-11-04 PROCEDURE — 85652 RBC SED RATE AUTOMATED: CPT

## 2020-11-04 PROCEDURE — 82550 ASSAY OF CK (CPK): CPT

## 2020-11-04 RX ORDER — INSULIN LISPRO 100/ML
20 VIAL (ML) SUBCUTANEOUS
Qty: 18 | Refills: 1
Start: 2020-11-04 | End: 2021-01-02

## 2020-11-04 RX ORDER — PANTOPRAZOLE SODIUM 20 MG/1
1 TABLET, DELAYED RELEASE ORAL
Qty: 14 | Refills: 0
Start: 2020-11-04 | End: 2020-11-17

## 2020-11-04 RX ORDER — INSULIN LISPRO 100/ML
10 VIAL (ML) SUBCUTANEOUS ONCE
Refills: 0 | Status: COMPLETED | OUTPATIENT
Start: 2020-11-04 | End: 2020-11-04

## 2020-11-04 RX ORDER — INSULIN LISPRO 100/ML
8 VIAL (ML) SUBCUTANEOUS ONCE
Refills: 0 | Status: COMPLETED | OUTPATIENT
Start: 2020-11-04 | End: 2020-11-04

## 2020-11-04 RX ORDER — LOSARTAN POTASSIUM 100 MG/1
1 TABLET, FILM COATED ORAL
Qty: 30 | Refills: 1
Start: 2020-11-04 | End: 2021-01-02

## 2020-11-04 RX ORDER — IBUPROFEN 200 MG
1 TABLET ORAL
Qty: 14 | Refills: 0
Start: 2020-11-04 | End: 2020-11-10

## 2020-11-04 RX ORDER — ISOPROPYL ALCOHOL, BENZOCAINE .7; .06 ML/ML; ML/ML
1 SWAB TOPICAL
Qty: 100 | Refills: 1
Start: 2020-11-04 | End: 2020-12-23

## 2020-11-04 RX ORDER — INSULIN LISPRO 100/ML
12 VIAL (ML) SUBCUTANEOUS ONCE
Refills: 0 | Status: COMPLETED | OUTPATIENT
Start: 2020-11-04 | End: 2020-11-04

## 2020-11-04 RX ORDER — HUMAN INSULIN 100 [IU]/ML
15 INJECTION, SUSPENSION SUBCUTANEOUS ONCE
Refills: 0 | Status: COMPLETED | OUTPATIENT
Start: 2020-11-04 | End: 2020-11-04

## 2020-11-04 RX ORDER — INSULIN HUMAN 100 [IU]/ML
15 INJECTION, SOLUTION SUBCUTANEOUS ONCE
Refills: 0 | Status: DISCONTINUED | OUTPATIENT
Start: 2020-11-04 | End: 2020-11-04

## 2020-11-04 RX ADMIN — Medication 55 UNIT(S): at 08:46

## 2020-11-04 RX ADMIN — Medication 8 UNIT(S): at 06:00

## 2020-11-04 RX ADMIN — ENOXAPARIN SODIUM 40 MILLIGRAM(S): 100 INJECTION SUBCUTANEOUS at 06:01

## 2020-11-04 RX ADMIN — NYSTATIN CREAM 1 APPLICATION(S): 100000 CREAM TOPICAL at 10:22

## 2020-11-04 RX ADMIN — Medication 10 UNIT(S): at 02:17

## 2020-11-04 RX ADMIN — Medication 8 UNIT(S): at 03:58

## 2020-11-04 RX ADMIN — Medication 2: at 08:46

## 2020-11-04 RX ADMIN — Medication 12 UNIT(S): at 01:10

## 2020-11-04 RX ADMIN — Medication 1 CAPSULE(S): at 08:48

## 2020-11-04 RX ADMIN — Medication 55 UNIT(S): at 12:39

## 2020-11-04 RX ADMIN — LOSARTAN POTASSIUM 50 MILLIGRAM(S): 100 TABLET, FILM COATED ORAL at 06:01

## 2020-11-04 RX ADMIN — Medication 4: at 12:39

## 2020-11-04 RX ADMIN — INSULIN GLARGINE 65 UNIT(S): 100 INJECTION, SOLUTION SUBCUTANEOUS at 08:47

## 2020-11-04 RX ADMIN — Medication 800 MILLIGRAM(S): at 11:50

## 2020-11-04 RX ADMIN — HUMAN INSULIN 15 UNIT(S): 100 INJECTION, SUSPENSION SUBCUTANEOUS at 08:47

## 2020-11-04 RX ADMIN — AMLODIPINE BESYLATE 10 MILLIGRAM(S): 2.5 TABLET ORAL at 06:01

## 2020-11-04 NOTE — PROGRESS NOTE ADULT - REASON FOR ADMISSION
hyperglycemia, uncontrolled BP, blurry vision, HA

## 2020-11-04 NOTE — PROGRESS NOTE ADULT - SUBJECTIVE AND OBJECTIVE BOX
INTERVAL HPI/OVERNIGHT EVENTS:    Patient is a 40y old  Female who presents with a chief complaint of hyperglycemia, uncontrolled BP, blurry vision, HA (2020 12:44)  Pt was seen and examined at the bedside. Pt reports [place] improved. Pt didn't receive lispro premeals before dinner.   She ate cereal, milk, muffin with banana for breakfast.    FSG & Insulin received:    Yesterday:  pre-dinner fs  4 units lispro SS  bedtime fs  lantus 65  units + 8   units lispro SS    2am: 378, 10 units ss  4am:330, 8 units ss    Today:  pre-breakfast fs  lantus 65  units ,  nutritional lispro  55 units+  2  units lispro SS  pre-lunch fs    nutritional lispro  55 units+  4  units lispro SS    MEDICATIONS  (STANDING):  amLODIPine   Tablet 10 milliGRAM(s) Oral daily  atorvastatin 40 milliGRAM(s) Oral at bedtime  dextrose 5%. 1000 milliLiter(s) (50 mL/Hr) IV Continuous <Continuous>  dextrose 50% Injectable 12.5 Gram(s) IV Push once  dextrose 50% Injectable 25 Gram(s) IV Push once  dextrose 50% Injectable 25 Gram(s) IV Push once  enoxaparin Injectable 40 milliGRAM(s) SubCutaneous every 12 hours  fluticasone propionate 50 MICROgram(s)/spray Nasal Spray 1 Spray(s) Both Nostrils every 12 hours  insulin glargine Injectable (LANTUS) 65 Unit(s) SubCutaneous two times a day  insulin lispro (ADMELOG) corrective regimen sliding scale   SubCutaneous Before meals and at bedtime  insulin lispro Injectable (ADMELOG) 55 Unit(s) SubCutaneous three times a day before meals  losartan 50 milliGRAM(s) Oral daily  nystatin Powder 1 Application(s) Topical two times a day    MEDICATIONS  (PRN):  acetaminophen 300 mG/butalbital 50 mG/ caffeine 40 mG 1 Capsule(s) Oral every 6 hours PRN migraine  dextrose 40% Gel 15 Gram(s) Oral once PRN Blood Glucose LESS THAN 70 milliGRAM(s)/deciliter  diphenhydrAMINE 25 milliGRAM(s) Oral every 6 hours PRN Rash and/or Itching  glucagon  Injectable 1 milliGRAM(s) IntraMuscular once PRN Glucose LESS THAN 70 milligrams/deciliter  ibuprofen  Tablet. 800 milliGRAM(s) Oral every 12 hours PRN Moderate Pain (4 - 6)      Past medical history reviewed  Family history reviewed  Social history reviewed    PHYSICAL EXAM  Vital Signs Last 24 Hrs  T(C): 36.6 (2020 12:39), Max: 37 (2020 22:44)  T(F): 97.9 (2020 12:39), Max: 98.6 (2020 22:44)  HR: 97 (2020 12:39) (97 - 104)  BP: 137/89 (2020 12:39) (121/81 - 154/80)  BP(mean): --  RR: 20 (2020 12:39) (18 - 20)  SpO2: 100% (2020 12:39) (97% - 100%)    Constitutional:  in NAD.   HEENT:  no proptosis or lid retraction  Neck: no thyromegaly or palpable thyroid nodules   Respiratory: lungs CTAB.  Cardiovascular: regular rhythm, normal S1 and S2  GI: soft, NT/ND  Neurology: no tremors, DTR 2+  Skin: no visible rashes/lesions  Psychiatric: AAO x 3, normal affect/mood.    LABS:                        13.4   9.65  )-----------( 310      ( 2020 06:59 )             42.3     11-    136  |  99  |  9   ----------------------------<  234<H>  4.5   |  25  |  0.60    Ca    9.7      2020 06:59  Phos  4.0     -  Mg     1.7         TPro  7.4  /  Alb  3.6  /  TBili  0.2  /  DBili  x   /  AST  38  /  ALT  30  /  AlkPhos  130<H>  -        Thyroid Stimulating Hormone, Serum: 1.367 uIU/mL ( @ 06:07)  Thyroid Stimulating Hormone, Serum: 1.782 uIU/mL ( @ 09:29)      HbA1C: 14.3 % ( @ 08:35)  12.9 % ( @ 09:29)  12.0 % ( @ 07:11)      CAPILLARY BLOOD GLUCOSE      POCT Blood Glucose.: 248 mg/dL (2020 12:10)  POCT Blood Glucose.: 178 mg/dL (2020 08:31)  POCT Blood Glucose.: 309 mg/dL (2020 05:05)  POCT Blood Glucose.: 330 mg/dL (2020 03:32)  POCT Blood Glucose.: 378 mg/dL (2020 01:58)  POCT Blood Glucose.: 417 mg/dL (2020 00:57)  POCT Blood Glucose.: 326 mg/dL (2020 22:07)  POCT Blood Glucose.: 227 mg/dL (2020 17:21)      Cholesterol, Serum: 219 mg/dL (20 @ 09:29)  HDL Cholesterol, Serum: 35 mg/dL (20 @ 09:29)  Triglycerides, Serum: 184 mg/dL (20 @ 09:29)  Direct LDL: 147 mg/dL (20 @ 09:29)  A/P: 39 y/o Female PMHx Type 2 DM, HTN, asthma, migraines, morbid obesity who presents with elevated BP and FSG, found to have FSG of 486, /122 with blurry vision and lactate of 3.5. Admitted for hypertensive urgency and hyperglycemia with need for further management. endo consulted for DM management.     1. DM type 2  A1c: 14.3%  weight: 127 kg  cr:0.4, GFR:123    for discharge   she can be discharge on u500 80 units TID and sliding scale lispro 2 hour post prandial, if -249:lispro 5 units , if FS:250-299 :lispro 10 units, if FS:300-349:lispro 15 units , if FS >350: 20 units.    -f/u Aldosterone/Plasma renin       case seen and discussed with   and primary team updated.     for discharge please make appointment  with Dr. Nisha Joseph.

## 2020-11-04 NOTE — DISCHARGE NOTE NURSING/CASE MANAGEMENT/SOCIAL WORK - NSDCFUADDAPPT_GEN_ALL_CORE_FT
1. We scheduled an appointment with Dr. Nisha Joseph on November 17th @ 1:00 PM to review your diabetes regimen. Please keep a log of your morning, pre-meal, and bedtime sugars and bring it to your visit. The office can be reached at [phone].    2. Please make it to your appointment with Dr. Shari Luna (neurology) on November 18th @ 3:00 PM regarding your headaches. The office can be reached at [phone].    3. Please schedule an appointment with an ophthalmologist at the [facility] ([facility]) on [address]. The office can be reached at [phone].

## 2020-11-04 NOTE — PROGRESS NOTE ADULT - ASSESSMENT
40F PMH Type 2 DM, HTN, asthma, migraines, morbid obesity who presents with elevated BP and FSG, found to have FSG of 486, /122 with blurry vision and lactate of 3.5. Admitted for hypertensive urgency and hyperglycemia. Neurology consulted for headache and blurry vision. MRI brain and CT head unremarkable.    Recommendations:  Headaches can be a form of atypical migraines given bilateral headache and constant presence. Headache and blurry vision much improved from yesterday.  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 This could be further assessed or confirmed with CT angiogram of the head. Arrange follow up with Dr. Luna for a CTA outpatient. Explained to patient that the size of aneurysm is most likely too small for any urgent inpatient intervention.   - opthalmology follow up on discharge  - Fioricet PRN for headaches after discharge  - BP and glycemic control as per primary team.    Plan discussed with Dr. Luna and Primary Team. Neurology will sign off. 40F PMH Type 2 DM, HTN, asthma, migraines, morbid obesity who presents with elevated BP and FSG, found to have FSG of 486, /122 with blurry vision and lactate of 3.5. Admitted for hypertensive urgency and hyperglycemia. Neurology consulted for headache and blurry vision. MRI brain and CT head unremarkable.    Recommendations:  Headaches can be a form of atypical migraines given bilateral headache and constant presence. Headache and blurry vision much improved from yesterday.  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 This could be further assessed or confirmed with CT angiogram of the head. Arrange follow up with Dr. Luna for a CTA outpatient. Explained to patient that this is NOT related to her present headaches as even if there were an aneurysm, it is so small that it would not be symptomatic.  - opthalmology follow up on discharge  - Fioricet PRN for headaches after discharge  - BP and glycemic control as per primary team.    Plan discussed with Dr. Luna and Primary Team. Neurology will sign off.

## 2020-11-04 NOTE — PROGRESS NOTE ADULT - SUBJECTIVE AND OBJECTIVE BOX
Patient was seen and examined by me at bedside. I agree with resident's note, subjective, objective physical exam, assessment and plan with following modifications/additions.    Greater than 35 minutes spent on total encounter; more than 50% of the visit was spent counseling and/or coordinating care by the attending physician.    41 y/o Female PMHx Type 2 DM, HTN, asthma, migraines, morbid obesity who presents with relatively acute blurry vision and headache in setting of hyperglycemia and marked HTN. both DM and HTN now controlled, headache improving, neg extensive workup including MRI and MRA/MRV and optho eval   -appreciate neuro rec re headache likely migraine- fiorcet on dc PRN, nsaids PRN, gi px with NSAIDs, outpt neuro f/u. unlikely SAH or psuedotumor, neg for PRESS, unlikely GCA with minimal ESR/CRP elevation  -re hyperglycemia- appreciate endo recs, will clarify addition of long acting lantus to home regimen and close outpt endo f/u  -DVT px-lovenox here  -Dispo- home today with close optho, neuro, endo followup      Sai Diehl MD [phone] Patient was seen and examined by me at bedside. I agree with resident's note, subjective, objective physical exam, assessment and plan with following modifications/additions.    Greater than 35 minutes spent on total encounter; more than 50% of the visit was spent counseling and/or coordinating care by the attending physician.    39 y/o Female PMHx Type 2 DM, HTN, asthma, migraines, morbid obesity who presents with relatively acute blurry vision and headache in setting of hyperglycemia and marked HTN. both DM and HTN now controlled, headache improving, neg extensive workup including MRI and MRA/MRV and optho eval   -appreciate neuro rec re headache likely migraine- fiorcet on dc PRN, nsaids PRN, gi px with NSAIDs, outpt neuro f/u. unlikely SAH or psuedotumor, neg for PRESS, unlikely GCA with minimal ESR/CRP elevation  -re hyperglycemia- appreciate endo recs, will clarify addition of long acting lantus to home regimen and close outpt endo f/u  -HTN management- continue current regimen on dc   -carotid aneurysm findings- outpt neuro f/u, unlikely to explain current presentation, HTN management as above  -Dispo- home today with close optho, neuro, endo followup      Sai Diehl MD [phone]

## 2020-11-04 NOTE — PROGRESS NOTE ADULT - ATTENDING COMMENTS
Ongoing headache/blurry vision probably due to migraine and uncontrolled DM, will definitely need to see Ophthalmology after discharge  HCT non revealing, f/up brain MRI results  Uncontrolled DM, apprec Endocrine assistance-lantus 40U every 12 hours, lispro 35U TID and adrenal work up  Hypertensive urgency, will increase losartan to 50 mg daily  Transaminitis improving, possibly due to fatty liver  Elevated lactate improving, no e/o DKA, likely due to dehydration/hyperglycemia  BMI of 44 --> Morbid obesity  Encouraged compliance
Pt seen on rounds this afternoon and events since admission reviewed.  Pt is known to me from a previous admission.  Is on U-500 insulin as outpatient, now on large doses of Lantus/Humalog as inpatient but with glucoses in 200 range.  Will increase the Lantus to 50 units BID, premeal to 40 lispro TID.  I have a question regarding possible areas of lipohypertrophy in her lower abdomen at her favored injection sites, and this might be contributing to some of her insulin resistance as outpatient.    Needs ophth exam to evaluate her blurred vision, which she describes very poorly.  She has an associated HA and photophobia but says it is not her usual migraine.  Less likely to think that the blurring is due to the fluctuating glucoses since her glucose is still quite high.  Need to rule out macular edema.
Pt seen on rounds this afternoon prior to discharge.  Glucoses were quite high after supper and overnight because she did not receive her standing insulin before supper.  Received large amounts of insulin coverage, and was down below 200 by this morning.  She is obviously quite insulin resistant and is a candidate for U-500.  By the same token, she does not feel that the U-500 is working--needed to explain to her that Dr. Joseph has not had a chance to adjust the dose appropriately.  An additional problem (evident in what she ate at supper last night and breakfast today) is that her carb intake needs to be significantly reduced.  Had a long (20-25 min) discussion with her about plans at this point:  --Agreed that she will go back on the U-500 at her previous dose for now, and contact Dr. Joseph tomorrow AM  --She is nervous about not having any way to treat markedly elevated glucoses at home--says "I'll just be back here tomorrow."  Therefore, gave her a coverage schedule to use with Humalog for post-prandials over 200 (see note above).  She needs to discuss this with Dr. Joseph  --Also strongly suggested that she restrict her carb intake to 30 grams per meal.  She will need further discussion and teaching from the dietician on this.  --Needs to be brought under control so that she can have bariatric surgery.
Please see attestation attached to 11/2 note, pt seen on 11/3.
I was physically present for the key portions of the evaluation and managemnent (E/M) service provided.  I agree with the above history, physical, and plan which I have reviewed and edited where appropriate, with the exceptions as per my note.    possible atypical migraine    MRA/V were ok, except ? tiny aneurysm.    - CTA as outpatient as it would not change current management and is not related to present HA.  - neuro, ophtho, endo f/u.    discussed with primary team.
Pt seen on rounds this afternoon.  Glucoses remain in the 200 range despite extremely large doses of insulin.  She has been compliant with her diet.  It appears that she does in fact need U-500 insulin.  Ophtho exam was negative for any pathology that would explain her visual complaints--would have to assume refractive error secondary to her hyperglycemia.  Her headache is somewhat improved but persists.  --If she is discharged today, she should resume her U-500 regimen and follow up with Dr. Joseph  --If she stays for additional Neuro workup, increase the Lantus to 65 units BID, pre-meal to 55 units TID  Discussed bariatric surgery with her, and she very much wants it.  She has already met with Dr. Dutta, but says that Dr. Joseph has told her that she cannot have surgery as yet.

## 2020-11-04 NOTE — PROGRESS NOTE ADULT - SUBJECTIVE AND OBJECTIVE BOX
NEUROLOGY CONSULT PROGRESS NOTE    INTERVAL HISTORY: Patient seen and examined at bedside. She appears in mild discomfort. She says she is still having a headache but it is much improved after the Fioricet. She also says that the blurry vision is improving too. Otherwise, she denies any dizziness, lightheadedness, numbness, or tingling.    REVIEW OF SYSTEMS:  As per HPI, otherwise negative for Constitutional, Eyes, Ears/Nose/Mouth/Throat, Neck, Cardiovascular, Respiratory, Gastrointestinal, Genitourinary, Skin, Endocrine, Musculoskeletal, Psychiatric, and Hematologic/Lymphatic.    MEDICATIONS:  acetaminophen 300 mG/butalbital 50 mG/ caffeine 40 mG 1 Capsule(s) Oral every 6 hours PRN  amLODIPine   Tablet 10 milliGRAM(s) Oral daily  atorvastatin 40 milliGRAM(s) Oral at bedtime  dextrose 40% Gel 15 Gram(s) Oral once PRN  dextrose 5%. 1000 milliLiter(s) IV Continuous <Continuous>  dextrose 50% Injectable 12.5 Gram(s) IV Push once  dextrose 50% Injectable 25 Gram(s) IV Push once  dextrose 50% Injectable 25 Gram(s) IV Push once  diphenhydrAMINE 25 milliGRAM(s) Oral every 6 hours PRN  enoxaparin Injectable 40 milliGRAM(s) SubCutaneous every 12 hours  fluticasone propionate 50 MICROgram(s)/spray Nasal Spray 1 Spray(s) Both Nostrils every 12 hours  glucagon  Injectable 1 milliGRAM(s) IntraMuscular once PRN  ibuprofen  Tablet. 800 milliGRAM(s) Oral every 12 hours PRN  insulin glargine Injectable (LANTUS) 65 Unit(s) SubCutaneous two times a day  insulin lispro (ADMELOG) corrective regimen sliding scale   SubCutaneous Before meals and at bedtime  insulin lispro Injectable (ADMELOG) 55 Unit(s) SubCutaneous three times a day before meals  losartan 50 milliGRAM(s) Oral daily  nystatin Powder 1 Application(s) Topical two times a day    VITAL SIGNS:  Vital Signs Last 24 Hrs  T(C): 36.6 (04 Nov 2020 12:39), Max: 37 (03 Nov 2020 22:44)  T(F): 97.9 (04 Nov 2020 12:39), Max: 98.6 (03 Nov 2020 22:44)  HR: 97 (04 Nov 2020 12:39) (97 - 104)  BP: 137/89 (04 Nov 2020 12:39) (121/81 - 154/80)  BP(mean): --  RR: 20 (04 Nov 2020 12:39) (18 - 20)  SpO2: 100% (04 Nov 2020 12:39) (97% - 100%)    PHYSICAL EXAMINATION:  Constitutional: obese, in mild distress  Eyes: anicteric sclera  Cardiovascular: +S1/S2, RRR; no M/R/G  Neurologic:  - Mental Status:  AAOx3; speech is fluent with intact naming, repetition, and comprehension  - Cranial Nerves II-XII:    II:  PERRLA; visual fields are full to confrontation  III, IV, VI:  EOMI, no nystagmus  V:  facial sensation is intact in the V1-V3 distribution bilaterally.  VII:  face is symmetric with normal eye closure and smile  VIII:  hearing is intact to finger rub  IX, X:  uvula is midline and soft palate rises symmetrically  XI:  head turning and shoulder shrug are intact bilaterally  XII:  tongue protrudes in the midline  - Motor:  strength is 5/5 throughout; normal muscle bulk and tone throughout; no pronator drift  - Reflexes:  2+ and symmetric at the biceps, triceps, brachioradialis, knees, and ankles;  plantar reflexes downgoing bilaterally  - Sensory:  intact to light touch, pin prick, vibration, and joint-position sense throughout  - Coordination:  finger-nose-finger and heel-knee-shin intact without dysmetria; rapid alternating hand movements intact  - Gait: deferred    LABS:                          13.4   9.65  )-----------( 310      ( 04 Nov 2020 06:59 )             42.3     11-04    136  |  99  |  9   ----------------------------<  234<H>  4.5   |  25  |  0.60    Ca    9.7      04 Nov 2020 06:59  Phos  4.0     11-04  Mg     1.7     11-04    TPro  7.4  /  Alb  3.6  /  TBili  0.2  /  DBili  x   /  AST  38  /  ALT  30  /  AlkPhos  130<H>  11-04          RADIOLOGY & ADDITIONAL STUDIES:      IMPRESSION & RECOMMENDATIONS:

## 2020-11-04 NOTE — DISCHARGE NOTE NURSING/CASE MANAGEMENT/SOCIAL WORK - PATIENT PORTAL LINK FT
You can access the FollowMyHealth Patient Portal offered by [facility] by registering at the following website: [email or web address]. By joining [facility]’s FollowMyHealth portal, you will also be able to view your health information using other applications (apps) compatible with our system.

## 2020-11-04 NOTE — PROGRESS NOTE ADULT - NSHPATTENDINGPLANDISCUSS_GEN_ALL_CORE
HS
Dr. COLVIN and [address] [place], [place] [id number]
Dr. COLVIN and [address] [place], [place] [address]taff
Dr. COLVIN and [address] [place], [place] [id number]

## 2020-11-05 LAB — ALDOST SERPL-MCNC: 3.6 NG/DL — SIGNIFICANT CHANGE UP

## 2020-11-06 ENCOUNTER — NON-APPOINTMENT (OUTPATIENT)
Age: 40
End: 2020-11-06

## 2020-11-06 RX ORDER — BLOOD-GLUCOSE METER
W/DEVICE EACH MISCELLANEOUS
Qty: 1 | Refills: 0 | Status: ACTIVE | COMMUNITY
Start: 2020-11-06 | End: 1900-01-01

## 2020-11-08 LAB — RENIN PLAS-CCNC: 2.9 NG/ML/HR — SIGNIFICANT CHANGE UP (ref 0.17–5.38)

## 2020-11-09 ENCOUNTER — NON-APPOINTMENT (OUTPATIENT)
Age: 40
End: 2020-11-09

## 2020-11-09 DIAGNOSIS — E11.65 TYPE 2 DIABETES MELLITUS WITH HYPERGLYCEMIA: ICD-10-CM

## 2020-11-09 DIAGNOSIS — Z88.0 ALLERGY STATUS TO PENICILLIN: ICD-10-CM

## 2020-11-09 DIAGNOSIS — H53.8 OTHER VISUAL DISTURBANCES: ICD-10-CM

## 2020-11-09 DIAGNOSIS — L90.5 SCAR CONDITIONS AND FIBROSIS OF SKIN: ICD-10-CM

## 2020-11-09 DIAGNOSIS — Z91.013 ALLERGY TO SEAFOOD: ICD-10-CM

## 2020-11-09 DIAGNOSIS — Z79.4 LONG TERM (CURRENT) USE OF INSULIN: ICD-10-CM

## 2020-11-09 DIAGNOSIS — Z88.1 ALLERGY STATUS TO OTHER ANTIBIOTIC AGENTS STATUS: ICD-10-CM

## 2020-11-09 DIAGNOSIS — E66.01 MORBID (SEVERE) OBESITY DUE TO EXCESS CALORIES: ICD-10-CM

## 2020-11-09 DIAGNOSIS — I16.0 HYPERTENSIVE URGENCY: ICD-10-CM

## 2020-11-09 DIAGNOSIS — G43.809 OTHER MIGRAINE, NOT INTRACTABLE, WITHOUT STATUS MIGRAINOSUS: ICD-10-CM

## 2020-11-10 ENCOUNTER — NON-APPOINTMENT (OUTPATIENT)
Age: 40
End: 2020-11-10

## 2020-11-10 ENCOUNTER — TRANSCRIPTION ENCOUNTER (OUTPATIENT)
Age: 40
End: 2020-11-10

## 2020-11-17 ENCOUNTER — APPOINTMENT (OUTPATIENT)
Dept: ENDOCRINOLOGY | Facility: CLINIC | Age: 40
End: 2020-11-17
Payer: MEDICAID

## 2020-11-17 VITALS
OXYGEN SATURATION: 98 % | DIASTOLIC BLOOD PRESSURE: 89 MMHG | BODY MASS INDEX: 46.28 KG/M2 | SYSTOLIC BLOOD PRESSURE: 153 MMHG | WEIGHT: 288 LBS | TEMPERATURE: 97.6 F | HEART RATE: 98 BPM | HEIGHT: 66 IN

## 2020-11-17 LAB — HBA1C MFR BLD HPLC: 12.5

## 2020-11-17 PROCEDURE — 99072 ADDL SUPL MATRL&STAF TM PHE: CPT

## 2020-11-17 PROCEDURE — 99214 OFFICE O/P EST MOD 30 MIN: CPT | Mod: 25

## 2020-11-17 PROCEDURE — 83036 HEMOGLOBIN GLYCOSYLATED A1C: CPT | Mod: QW

## 2020-11-17 RX ORDER — SEMAGLUTIDE 1.34 MG/ML
2 INJECTION, SOLUTION SUBCUTANEOUS
Qty: 1 | Refills: 1 | Status: DISCONTINUED | COMMUNITY
Start: 2020-06-26 | End: 2020-11-17

## 2020-11-17 NOTE — HISTORY OF PRESENT ILLNESS
[FreeTextEntry1] : Patient is a 41 yo woman with uncontrolled T2DM (A1c 15%), class III obese, HTN and HLD here for diabetes follow up\par \par Diabetes diagnosed in 2005 based on routine lab work. Initially on metformin and then medication escalated to insulin.  Previously on basal insulin 85 units TID and bolus insulin 35 units TID. Despite adherence to medications and restrictive eating, sugars have remained high. At the last visit, insulin regimen was changed to U500 90 units TID and admelog 40 units TID with meals.  Also adheres to SMBG and metformin 1000 mg BID.  Since the last visit, patient hospitalized for elevated blood pressure.  Previous complications include infections. Patient is hoping for metabolic surgery.\par Breakfast: skips, half a cup of coffee, water with lemon\par Lunch: salad with grilled chicken, soup broth \par Dinner: yuca, vegetables\par SMBG: ranges from 290-can go to 400 when she experiences headaches and pain. No relief from Fiorcet\par Dilated eye exam: history of retinopathy, was told to come back to eye doctor when sugars are more regulated\par Neurology: sugars increase when she develops headaches. Has an appointment with new neurologist tomorrow

## 2020-11-17 NOTE — PHYSICAL EXAM
[Alert] : alert [Well Nourished] : well nourished [Healthy Appearance] : healthy appearance [No Acute Distress] : no acute distress [EOMI] : extra ocular movement intact [Normal Hearing] : hearing was normal [No Respiratory Distress] : no respiratory distress [No Accessory Muscle Use] : no accessory muscle use [Clear to Auscultation] : lungs were clear to auscultation bilaterally [Normal to Percussion] : lungs were normal to percussion [Normal S1, S2] : normal S1 and S2 [Normal Rate] : heart rate was normal [Normal Bowel Sounds] : normal bowel sounds [Soft] : abdomen soft [No Stigmata of Cushings Syndrome] : no stigmata of Cushings Syndrome [No Motor Deficits] : the motor exam was normal [Normal Affect] : the affect was normal [Normal Insight/Judgement] : insight and judgment were intact [Normal Mood] : the mood was normal [de-identified] : left under arm comedome

## 2020-11-17 NOTE — ASSESSMENT
[Diabetes Foot Care] : diabetes foot care [Long Term Vascular Complications] : long term vascular complications of diabetes [Carbohydrate Consistent Diet] : carbohydrate consistent diet [Importance of Diet and Exercise] : importance of diet and exercise to improve glycemic control, achieve weight loss and improve cardiovascular health [Exercise/Effect on Glucose] : exercise/effect on glucose [Action and use of Insulin] : action and use of short and long-acting insulin [Self Monitoring of Blood Glucose] : self monitoring of blood glucose [Injection Technique, Storage, Sharps Disposal] : injection technique, storage, and sharps disposal [Retinopathy Screening] : Patient was referred to ophthalmology for retinopathy screening [FreeTextEntry1] : Patient is a 39 yo woman with uncontrolled T2DM, HTN and HLD here for diabetes follow up\par \par 1. Uncontrolled diabetes\par -Patient's POCT A1c has improved in the span of one month to 12.5% which is not at goal but better\par -she is adherent to her diabetes medicines, fingerstick checks and diet\par -increase U500 to 90 TID, continue with prandial insulin 35 units TID\par -restart Trulicity. Side effects including GI issues discussed. Denies personal/family history of pancreatitis, pancreatic cancer/MEN/MTC\par -patient to follow up with ophthalmology\par -discussed that metabolic surgery at this point would be helpful as she is on high doses of insulin and very insulin resistant\par -follow up with metabolic surgery\par -ensure follow up with neurology for headache diagnosis and management\par -not sexually active at this time\par -risks of conception in the setting of uncontrolled diabetes discussed\par \par 2. HTN\par -BP elevated, optimize per PCP\par -pain management\par \par 3. HLD\par -LDL goal < 70\par -weight loss and diet at this time\par \par 4. BMI Class III\par -surgery is being considered\par \par Follow up in 2-3 months, call with hypo/hyperglycemic excursions.

## 2020-11-17 NOTE — REVIEW OF SYSTEMS
[Chest Pain] : chest pain [Fatigue] : no fatigue [Decreased Appetite] : appetite not decreased [Visual Field Defect] : no visual field defect [Dysphagia] : no dysphagia [Dysphonia] : no dysphonia [Slow Heart Rate] : heart rate is not slow [Palpitations] : no palpitations [Fast Heart Rate] : heart rate is not fast [Shortness Of Breath] : no shortness of breath [Nausea] : no nausea [Constipation] : no constipation [Vomiting] : no vomiting [Diarrhea] : no diarrhea [Headaches] : no headaches [Confusion] : no confusion [Tremors] : no tremors [Poor Balance] : steady state balance [Depression] : no depression [Cold Intolerance] : no cold intolerance [Heat Intolerance] : no heat intolerance [FreeTextEntry5] : chest pain accompanied with headache [FreeTextEntry8] : LMP: September 2020

## 2020-11-18 ENCOUNTER — APPOINTMENT (OUTPATIENT)
Dept: NEUROLOGY | Facility: CLINIC | Age: 40
End: 2020-11-18
Payer: MEDICAID

## 2020-11-18 VITALS
WEIGHT: 288 LBS | SYSTOLIC BLOOD PRESSURE: 151 MMHG | HEART RATE: 105 BPM | DIASTOLIC BLOOD PRESSURE: 91 MMHG | BODY MASS INDEX: 46.28 KG/M2 | TEMPERATURE: 97.9 F | HEIGHT: 66 IN | OXYGEN SATURATION: 94 %

## 2020-11-18 DIAGNOSIS — R51.9 HEADACHE, UNSPECIFIED: ICD-10-CM

## 2020-11-18 DIAGNOSIS — R29.898 OTHER SYMPTOMS AND SIGNS INVOLVING THE MUSCULOSKELETAL SYSTEM: ICD-10-CM

## 2020-11-18 DIAGNOSIS — H53.8 OTHER VISUAL DISTURBANCES: ICD-10-CM

## 2020-11-18 PROCEDURE — 99072 ADDL SUPL MATRL&STAF TM PHE: CPT

## 2020-11-18 PROCEDURE — 99215 OFFICE O/P EST HI 40 MIN: CPT

## 2020-11-19 ENCOUNTER — TRANSCRIPTION ENCOUNTER (OUTPATIENT)
Age: 40
End: 2020-11-19

## 2020-11-19 PROBLEM — R51.9 FREQUENT HEADACHES: Status: ACTIVE | Noted: 2020-11-18

## 2020-11-20 ENCOUNTER — APPOINTMENT (OUTPATIENT)
Dept: SURGERY | Facility: CLINIC | Age: 40
End: 2020-11-20
Payer: MEDICAID

## 2020-11-20 VITALS
BODY MASS INDEX: 47.09 KG/M2 | WEIGHT: 293 LBS | SYSTOLIC BLOOD PRESSURE: 149 MMHG | HEART RATE: 108 BPM | HEIGHT: 66 IN | OXYGEN SATURATION: 98 % | TEMPERATURE: 96.5 F | DIASTOLIC BLOOD PRESSURE: 88 MMHG

## 2020-11-20 PROCEDURE — 99213 OFFICE O/P EST LOW 20 MIN: CPT

## 2020-11-20 NOTE — HISTORY OF PRESENT ILLNESS
[de-identified] : Pt is a 39 y/o F with pmhx of DM, HLD, HTN who presents today feeling well here for evaluation on bariatric workup. Pt states in early November she was hospitalized for HTN reports bp 200/100 with loss of vision, hyperglycemia. Pt was discharged around November 6th and is currently on 3 injections daily for DM and Metformin. Pt has completed her monthly weigh ins and has to continue completing the remainder of her testings. Pt is requesting new orders to be given to her which were emailed today. Pt currently taking labetalol.

## 2020-11-30 ENCOUNTER — TRANSCRIPTION ENCOUNTER (OUTPATIENT)
Age: 40
End: 2020-11-30

## 2020-12-01 ENCOUNTER — TRANSCRIPTION ENCOUNTER (OUTPATIENT)
Age: 40
End: 2020-12-01

## 2020-12-02 ENCOUNTER — TRANSCRIPTION ENCOUNTER (OUTPATIENT)
Age: 40
End: 2020-12-02

## 2020-12-03 DIAGNOSIS — A04.8 OTHER SPECIFIED BACTERIAL INTESTINAL INFECTIONS: ICD-10-CM

## 2020-12-03 LAB
TSH SERPL-ACNC: 1.2 UIU/ML
UREA BREATH TEST QL: POSITIVE

## 2020-12-03 RX ORDER — OMEPRAZOLE 20 MG/1
20 CAPSULE, DELAYED RELEASE ORAL TWICE DAILY
Qty: 28 | Refills: 0 | Status: ACTIVE | COMMUNITY
Start: 2020-12-03 | End: 1900-01-01

## 2020-12-14 ENCOUNTER — APPOINTMENT (OUTPATIENT)
Dept: ENDOCRINOLOGY | Facility: CLINIC | Age: 40
End: 2020-12-14

## 2020-12-21 ENCOUNTER — TRANSCRIPTION ENCOUNTER (OUTPATIENT)
Age: 40
End: 2020-12-21

## 2020-12-22 ENCOUNTER — APPOINTMENT (OUTPATIENT)
Dept: NEUROLOGY | Facility: CLINIC | Age: 40
End: 2020-12-22

## 2020-12-23 PROBLEM — Z87.440 HISTORY OF URINARY TRACT INFECTION: Status: RESOLVED | Noted: 2020-10-07 | Resolved: 2020-12-23

## 2021-01-04 ENCOUNTER — TRANSCRIPTION ENCOUNTER (OUTPATIENT)
Age: 41
End: 2021-01-04

## 2021-01-05 ENCOUNTER — TRANSCRIPTION ENCOUNTER (OUTPATIENT)
Age: 41
End: 2021-01-05

## 2021-01-05 RX ORDER — INSULIN HUMAN 100 [IU]/ML
100 INJECTION, SUSPENSION SUBCUTANEOUS
Qty: 5 | Refills: 0 | Status: DISCONTINUED | COMMUNITY
Start: 2020-11-30 | End: 2021-01-05

## 2021-01-06 ENCOUNTER — TRANSCRIPTION ENCOUNTER (OUTPATIENT)
Age: 41
End: 2021-01-06

## 2021-01-12 ENCOUNTER — TRANSCRIPTION ENCOUNTER (OUTPATIENT)
Age: 41
End: 2021-01-12

## 2021-01-14 RX ORDER — FLUCONAZOLE 150 MG/1
150 TABLET ORAL DAILY
Qty: 7 | Refills: 0 | Status: DISCONTINUED | COMMUNITY
Start: 2021-01-14 | End: 2021-01-14

## 2021-01-27 ENCOUNTER — TRANSCRIPTION ENCOUNTER (OUTPATIENT)
Age: 41
End: 2021-01-27

## 2021-01-28 ENCOUNTER — APPOINTMENT (OUTPATIENT)
Dept: ENDOCRINOLOGY | Facility: CLINIC | Age: 41
End: 2021-01-28
Payer: MEDICAID

## 2021-01-28 VITALS
BODY MASS INDEX: 47.09 KG/M2 | HEIGHT: 66 IN | DIASTOLIC BLOOD PRESSURE: 98 MMHG | OXYGEN SATURATION: 97 % | HEART RATE: 103 BPM | WEIGHT: 293 LBS | SYSTOLIC BLOOD PRESSURE: 160 MMHG | TEMPERATURE: 97.9 F

## 2021-01-28 PROCEDURE — 99214 OFFICE O/P EST MOD 30 MIN: CPT

## 2021-01-28 PROCEDURE — 99072 ADDL SUPL MATRL&STAF TM PHE: CPT

## 2021-01-28 NOTE — REVIEW OF SYSTEMS
[Fatigue] : no fatigue [Decreased Appetite] : appetite not decreased [Dysphagia] : no dysphagia [Dysphonia] : no dysphonia [Chest Pain] : no chest pain [Palpitations] : no palpitations [Shortness Of Breath] : no shortness of breath [Vomiting] : no vomiting [As Noted in HPI] : as noted in HPI [Headaches] : headaches [Depression] : depression [Anxiety] : anxiety [Stress] : stress [Cold Intolerance] : no cold intolerance

## 2021-01-28 NOTE — PHYSICAL EXAM
[Alert] : alert [Well Nourished] : well nourished [No Acute Distress] : no acute distress [EOMI] : extra ocular movement intact [Normal Hearing] : hearing was normal [No Respiratory Distress] : no respiratory distress [Clear to Auscultation] : lungs were clear to auscultation bilaterally [Normal S1, S2] : normal S1 and S2 [Normal Rate] : heart rate was normal [Normal Bowel Sounds] : normal bowel sounds [Not Tender] : non-tender [Soft] : abdomen soft [Normal Gait] : normal gait [Normal Strength/Tone] : muscle strength and tone were normal [No Motor Deficits] : the motor exam was normal [Normal Affect] : the affect was normal [Normal Insight/Judgement] : insight and judgment were intact [Normal Mood] : the mood was normal

## 2021-01-28 NOTE — ASSESSMENT
[FreeTextEntry1] : Patient is a 39 yo woman with uncontrolled T2DM, BMI >45, HTN here for follow up endocrine care\par \par 1. Uncontrolled T2DM\par -risks of uncontrolled diabetes including micro and macrovascular complications (MI, stroke, death) were discussed\par -based on food recall she is eating very little as she has nausea and bloating, possible gastroparesis. Denies eating fast food, has cut down on soda intake\par -adhering to insulin regimen but feels very disappointed as A1c has not decreased as much.  Most recent serum A1c (patient had on her phone) was 10.9%. There is some decrease in glucose as well. Fingersticks previously were in the 200-300's but recently in the 190's to low 220's with occasional 300-400s, not as often\par -no reported hypoglycemia\par -increase U500 to 135 units TID with meals, continue with metformin 1000 mg BID and ozempic\par -hypoglycemia education provided\par -offered nutrition counseling and she already has plans for metabolic surgery\par -discussed risks of pregnancy and to use contraception if sexually active\par -optimization for surgery requires A1c < 8-9%\par -referral for ophthalmology provided\par -if sugars remain elevated, offered the option of adding a sulfonylurea to get A1c to target quickly.  Would not consider SGLT1 inhibitors as she has frequent fungal/yeast infections\par \par 2. BMI 45\par -agree that metabolic surgery would be ideal\par -she has already established consultation\par -requires diabetes/A1c optimization\par -she was ruled out for Cushing's disease with dexamethasone suppression testing. She suppressed to < 1.8\par -check screening salivary cortisol\par \par 3. HTN\par -BP elevated, patient crying and upset as she feels there is no significant improvement in her weight\par -positive reinforcement provided\par \par Follow up in 2 months. Call or utilize [facility] to inform me of hypo/hyperglycemic excursions [Long Term Vascular Complications] : long term vascular complications of diabetes [Carbohydrate Consistent Diet] : carbohydrate consistent diet [Importance of Diet and Exercise] : importance of diet and exercise to improve glycemic control, achieve weight loss and improve cardiovascular health [Exercise/Effect on Glucose] : exercise/effect on glucose [Self Monitoring of Blood Glucose] : self monitoring of blood glucose [Insulin Self-Administration] : insulin self-administration [Injection Technique, Storage, Sharps Disposal] : injection technique, storage, and sharps disposal [Retinopathy Screening] : Patient was referred to ophthalmology for retinopathy screening

## 2021-01-28 NOTE — HISTORY OF PRESENT ILLNESS
[FreeTextEntry1] : Patient is a 39 yo woman with uncontrolled type 2 diabetes, HTN and HLD here for diabetes follow up\par \par Type 2 diabetes diagnosed in 2005 based on blood work. In 2007, she was started metformin 1000 mg BID. In 2009, patient was pregnant and started on glyburide- did not require insulin for pregnancy. States that when she lost weight, diabetes got worse.\par Check sugars 3-4 times a day\par Sugars were in the 200-300s daily. Recently 190-220s, has some 300's.  With pain, sugars go high.\par Was on basal insulin (two samples)-75 units, bolus insulin 40-50 TID with meals. Ran out of insulin over 1 week ago. When she ran out of insulin, she took metformin once a day\par Never had weight loss surgery but waiting to obtain gastric surgery\par Breakfast: skips, has some nausea\par Lunch: crackers, salads; white rice, long-grain rice with garlic butter + soy sauce; does not eat food\par Dinner: vegetables, Stateless food with rice\par Does not like chocolate\par Drinks soda: used to drink 2 liters of Coke/Pepsi but now has cut back. Has water with lemon\par Dilated eye exam: has an appointment; left eye retinopathy\par Since the last visit, she was diagnosed with H. Pylori infection which is being treated\par At the first visit, she was started on U500 and is currently taking 120 units TID, metformin 1000 mg bid, ozempic\par If sugars go to 300s, will take humalog 40 units

## 2021-02-01 ENCOUNTER — TRANSCRIPTION ENCOUNTER (OUTPATIENT)
Age: 41
End: 2021-02-01

## 2021-02-01 ENCOUNTER — NON-APPOINTMENT (OUTPATIENT)
Age: 41
End: 2021-02-01

## 2021-02-19 NOTE — ED ADULT NURSE NOTE - NS ED NURSE REPORT GIVEN DT
Labs drawn from right antecubital area. 23 gauge WONC used. Gauze and tape/band-aid dressing applied. Site appears clean, dry and intact. Patient tolerated procedure well, no adverse reactions noted, instructed patient to call with any questions or concerns.    Patient Discharged: Pt discharged to home per self, ambulatory.     13-Mar-2018 05:45

## 2021-03-08 ENCOUNTER — TRANSCRIPTION ENCOUNTER (OUTPATIENT)
Age: 41
End: 2021-03-08

## 2021-03-08 NOTE — PRE-OP CHECKLIST - WAS PATIENT ON BETA BLOCKER?
Subjective: Interim:    Last Prolia 8/20  No fractures since Prolia  Feels better on it  Right hip replacement   Left in the past  States OA    Yinka Hwang is a  who is here for evaluation of osteoporosis. Patient is being seen at the request of  Nadia Renee MD     he was diagnosed with Osteoporsis in 2012 5/18/2020     FINDINGS:  LUMBAR SPINE:     The lumbar spine cannot be utilized as all the T-scores are greater than 1  discrepant from L3.     FOREARM:     The BMD of the middle third of the radius of the distal forearm equals 0.557  g/cm2. The T- and Z-scores are -2.3 and -4.4 standard deviations from the  mean. This is within the osteopenia range by WHO criteria.     Since prior exam, there has been a 0.014g/cm2 decrease in bone mineral  density, corresponding to a 2.4% change, which is not significant.     RIGHT HIP:     The bone mineral density in the total hip is measured at 0.688 g/cm2  corresponding to a T-score of -2.1 and a Z-score of -2.0. This is within the  osteopenia range by WHO criteria.     Since prior exam, there has been a 0.012g/cm2 decrease in bone mineral  density, corresponding to a 1.6% change, which is not significant.     The bone mineral density of the femoral neck is measured at 0.536 g/cm2  corresponding to a T-score of -2.8 and a Z-score of -2.2. This is within the  osteoporosis range by WHO criteria.     IMPRESSION:  Osteoporosis by WHO criteria.     No significant interval change. 5/18/2020       DEXA scan 1/17  LUMBAR SPINE:       The bone mineral density in the lumbar spine including the L1 through L3   levels is measured at 0.707 g/cm2, which corresponds to a T-score of -3.3 and   a Z-score of -3.0.  This is within the osteoporosis range by WHO criteria.       LEFT HIP:       The bone mineral density in the total hip is measured at 0.695 g/cm2   corresponding to a T-score of -2.2 and a Z-score of -2.0.  This is within the   osteopenia range by [facility]
criteria.       The bone mineral density of the femoral neck is measured at 0.574 g/cm2   corresponding to a T-score of -2.6 and a Z-score of -1.9.  This is within the   osteoporosis range by WHO criteria.       RIGHT HIP:       The bone mineral density in the total hip is measured at 0.7 g/cm2   corresponding to a T-score of -2.2 and a Z-score of -1.9.  This is within the   osteopenia range by [facility] criteria.       The bone mineral density of the femoral neck is measured at 0.566 g/cm2   corresponding to a T-score of -2.7 and a Z-score of -2.0.  This is within the   osteoporosis range by WHO criteria. Dexa 3/12:      Total Lumbar Spine:    T-score = -3.1    Z-score = -2.9    BMD =      0.753 g/cm       Right Femoral Neck:    T-score = -2.4    Z-score = -1.9    BMD =       0.602 g/cm       Right Total Hip:    T-score = -2.1     Z-score = -1.9    BMD =      0.715 g/cm       Left Femoral Neck:    T-score = -2.5    Z-score = -1.9    BMD =      0.593 g/cm       Total Left Hip:    T-score = -2.3    Z-score = -2.1    BMD =      0.688 g/cm       One-Third Forearm:    T-score = -2.5    Z-score = -2.2    BMD =      0.625 g/cm       Total Forearm:    T-score = -3.1    Z-score = -2.8    BMD =     0.515 g/cm     History of fractures :  Right arm fracture, fall from stairs    Pharmacological therapy for Osteoprosis:  Fosamax - he was taking since 2012  Prolia  10/17, 3/18, 1/19, 8.20     FH of Osteoporosis: mom   FH of hip fracture:yes- mom    Secondary causes of osteoprorsis: Menopause, smoking, prolonged steroid use. Calcium: Diet : 900mg Supplement: 200mg TID- not taking    Vitamin D:  50,000IU weekly  Level 31 on 12/16  Exercise: Follows up regularly with a dentist. No major dental procedures planned.     Reports h/o spinal fusion surgery, DDD of the back, Disc bulge, stenosis, back problem since Age 28  2013 hard ware removed, as screws came out, was told bones are weak    12/16 CBC, TSH nl  6/15 Testosterone 491 CMP
nl  1/17 negative celiac panel    No chronic steroids. Smoker 1PPD  For 17 years. Still smoking but cutting back      SH:  at [facility]    Past Medical History:   Diagnosis Date    Allergic sinusitis     Anxiety and depression     currently under care of pyschiatrist    COPD (chronic obstructive pulmonary disease) ([facility] [address]     Hip pain 09/05/2017    ? CAM possible bilateral superior femoral head-heck junction bump suggestive of femoro acetabular impingement syndrome    Insomnia     Localized edema 2/24/2018    Neg abd ultrasound and DVT    Osteoporosis     Spontaneous pneumothorax     1991    Testicular torsion     Wears dentures     Wears glasses      Past Surgical History:   Procedure Laterality Date    BACK SURGERY  12/26/2016    1st rods, 2nd tens unit 3rd rods and screw removed, only has cages now   [address]      x 2    TOTAL HIP ARTHROPLASTY Left 1/29/2019    TOTAL HIP REPLACEMENT LEFT HIP (ADVANCED) performed by Peggy Bearden MD at [address] [place] [address] Right 11/30/2020    TOTAL RIGHT HIP REPLACEMENT performed by Peggy Bearden MD at [place] [id number]     Current Outpatient Medications   Medication Sig Dispense Refill    acetaminophen-codeine (TYLENOL #3) 300-30 MG per tablet acetaminophen 300 mg-codeine 30 mg tablet      HYDROcodone-acetaminophen (NORCO)  MG per tablet hydrocodone 10 mg-acetaminophen 325 mg tablet      lidocaine (LIDODERM) 5 % Place 1 patch onto the skin daily 12 hours on, 12 hours off.  30 patch 0    montelukast (SINGULAIR) 10 MG tablet Take 1 tablet by mouth nightly 30 tablet 2    tiZANidine (ZANAFLEX) 4 MG tablet 1 tab q 8 hours as needed 90 tablet 0    albuterol sulfate  (90 Base) MCG/ACT inhaler Inhale 2 puffs into the lungs every 4 hours as needed for Wheezing 1 Inhaler 2    vitamin D (ERGOCALCIFEROL) 1.25 MG (94206 UT) CAPS capsule Take 50,000 Units by mouth once a week      nicotine (NICODERM CQ) 14
MG/24HR Place 1 patch onto the skin every 24 hours 30 patch 5    fluticasone-salmeterol (ADVAIR HFA) 45-21 MCG/ACT inhaler Inhale 2 puffs into the lungs 2 times daily 1 Inhaler 3    loratadine (CLARITIN) 10 MG tablet TAKE ONE TABLET BY MOUTH EVERY EVENING 30 tablet 0    amitriptyline (ELAVIL) 25 MG tablet 2 po q HS (Patient taking differently: Take 25 mg by mouth nightly as needed for Sleep 2 po q HS) 60 tablet 5    Tens Unit MISC by Does not apply route 1 each 0    denosumab (PROLIA) 60 MG/ML SOSY SC injection Inject 1 mL into the skin once for 1 dose 1 mL 0    aspirin EC 81 MG EC tablet Take 1 tablet by mouth 2 times daily Take for DVT blood clot prophylaxis. Please avoid missing doses. 60 tablet 0     No current facility-administered medications for this visit. Review of Systems  Scanned, reviewed    Vitals:    03/08/21 1125   BP: (!) 148/89   Pulse: 90   Resp: 14   Temp: 97.6 °F (36.4 °C)       Constitutional: Well-developed, appears stated age, cooperative, in no acute distress  H/E/N/M/T:atraumatic, normocephalic, external ears, nose, lips normal without lesions  Eyes: Lids, lashes, conjunctivae and sclerae normal, No proptosis, no redness  Neck: supple, symmetrical, no swelling  Skin: No obvious rashes or lesions present. Skin and hair texture normal  Psychiatric: Judgement and Insight:  judgement and insight appear normal  Neuro: Normal without focal findings, speech is normal normal, speech is spontaneous  Chest: No labored breathing, no chest deformity, no stridor  Musculoskeletal: No joint deformity, swelling      Lab Review  No results found for: TSH  No results found for: FREET4      Assessment:     1. Osteoporosis: H/o smoking, +FH, failure to reach peak bone mass, are risk factors. Testosterone was normal, celiac panel normal.No use of steroids . Normal Ca and alkaline phosphatase . Suspicion for Cushing's is low. Nl  24 hour urine Ca. Do not suspect systemic mastocytosis.    Was On fosamax
for about 5 years, needed anti resorptive therapy given low BMD, switched to Prolia, took irregularly, last 8/20, insurance not covering  2. Chronic Back pain  3. COPD  4. Tobacco Abuse: recommend cessation, discussed effects on BMD     Plan:     Discussed management of osteoporosis in detail. Baseline work-up showed normal CBC, renal and liver function. Discussed treatment options  Told him that given high risk of fracture and osteoporosis,  needs Tx, restart prolia as has been on it and no new fractures  Discussed the side effects including Osteonecrosis of the jaw and Atypical fractures. Patient understands that the risk is low.    Dexa scan 11/21  Started Calcium supplement 500mg daily
No

## 2021-03-17 ENCOUNTER — APPOINTMENT (OUTPATIENT)
Dept: ENDOCRINOLOGY | Facility: CLINIC | Age: 41
End: 2021-03-17

## 2021-03-18 ENCOUNTER — APPOINTMENT (OUTPATIENT)
Dept: INFECTIOUS DISEASE | Facility: CLINIC | Age: 41
End: 2021-03-18

## 2021-03-18 ENCOUNTER — EMERGENCY (EMERGENCY)
Facility: HOSPITAL | Age: 41
LOS: 1 days | Discharge: HOME | End: 2021-03-18
Attending: EMERGENCY MEDICINE | Admitting: EMERGENCY MEDICINE
Payer: COMMERCIAL

## 2021-03-18 VITALS
SYSTOLIC BLOOD PRESSURE: 161 MMHG | HEART RATE: 100 BPM | DIASTOLIC BLOOD PRESSURE: 91 MMHG | OXYGEN SATURATION: 96 % | RESPIRATION RATE: 18 BRPM | TEMPERATURE: 99 F

## 2021-03-18 VITALS
HEIGHT: 67 IN | TEMPERATURE: 98 F | RESPIRATION RATE: 18 BRPM | OXYGEN SATURATION: 98 % | HEART RATE: 109 BPM | WEIGHT: 293 LBS | SYSTOLIC BLOOD PRESSURE: 209 MMHG | DIASTOLIC BLOOD PRESSURE: 101 MMHG

## 2021-03-18 DIAGNOSIS — Z87.59 PERSONAL HISTORY OF OTHER COMPLICATIONS OF PREGNANCY, CHILDBIRTH AND THE PUERPERIUM: ICD-10-CM

## 2021-03-18 DIAGNOSIS — Z98.89 OTHER SPECIFIED POSTPROCEDURAL STATES: Chronic | ICD-10-CM

## 2021-03-18 DIAGNOSIS — R10.9 UNSPECIFIED ABDOMINAL PAIN: ICD-10-CM

## 2021-03-18 DIAGNOSIS — Z79.1 LONG TERM (CURRENT) USE OF NON-STEROIDAL ANTI-INFLAMMATORIES (NSAID): ICD-10-CM

## 2021-03-18 DIAGNOSIS — Z88.1 ALLERGY STATUS TO OTHER ANTIBIOTIC AGENTS STATUS: ICD-10-CM

## 2021-03-18 DIAGNOSIS — E11.9 TYPE 2 DIABETES MELLITUS WITHOUT COMPLICATIONS: ICD-10-CM

## 2021-03-18 DIAGNOSIS — G43.909 MIGRAINE, UNSPECIFIED, NOT INTRACTABLE, WITHOUT STATUS MIGRAINOSUS: ICD-10-CM

## 2021-03-18 DIAGNOSIS — Z88.2 ALLERGY STATUS TO SULFONAMIDES: ICD-10-CM

## 2021-03-18 DIAGNOSIS — Z98.890 OTHER SPECIFIED POSTPROCEDURAL STATES: Chronic | ICD-10-CM

## 2021-03-18 DIAGNOSIS — Z79.811 LONG TERM (CURRENT) USE OF AROMATASE INHIBITORS: ICD-10-CM

## 2021-03-18 DIAGNOSIS — R51.9 HEADACHE, UNSPECIFIED: ICD-10-CM

## 2021-03-18 DIAGNOSIS — B37.3 CANDIDIASIS OF VULVA AND VAGINA: ICD-10-CM

## 2021-03-18 DIAGNOSIS — Z88.0 ALLERGY STATUS TO PENICILLIN: ICD-10-CM

## 2021-03-18 DIAGNOSIS — Z87.19 PERSONAL HISTORY OF OTHER DISEASES OF THE DIGESTIVE SYSTEM: ICD-10-CM

## 2021-03-18 DIAGNOSIS — Z91.041 RADIOGRAPHIC DYE ALLERGY STATUS: ICD-10-CM

## 2021-03-18 DIAGNOSIS — Z91.013 ALLERGY TO SEAFOOD: ICD-10-CM

## 2021-03-18 DIAGNOSIS — Z79.51 LONG TERM (CURRENT) USE OF INHALED STEROIDS: ICD-10-CM

## 2021-03-18 DIAGNOSIS — E66.01 MORBID (SEVERE) OBESITY DUE TO EXCESS CALORIES: ICD-10-CM

## 2021-03-18 DIAGNOSIS — E78.5 HYPERLIPIDEMIA, UNSPECIFIED: ICD-10-CM

## 2021-03-18 DIAGNOSIS — Z79.899 OTHER LONG TERM (CURRENT) DRUG THERAPY: ICD-10-CM

## 2021-03-18 DIAGNOSIS — I10 ESSENTIAL (PRIMARY) HYPERTENSION: ICD-10-CM

## 2021-03-18 DIAGNOSIS — L03.011 CELLULITIS OF RIGHT FINGER: ICD-10-CM

## 2021-03-18 DIAGNOSIS — Z79.4 LONG TERM (CURRENT) USE OF INSULIN: ICD-10-CM

## 2021-03-18 DIAGNOSIS — J45.909 UNSPECIFIED ASTHMA, UNCOMPLICATED: ICD-10-CM

## 2021-03-18 PROCEDURE — 96374 THER/PROPH/DIAG INJ IV PUSH: CPT | Mod: XU

## 2021-03-18 PROCEDURE — 96375 TX/PRO/DX INJ NEW DRUG ADDON: CPT

## 2021-03-18 PROCEDURE — 99284 EMERGENCY DEPT VISIT MOD MDM: CPT | Mod: 25

## 2021-03-18 PROCEDURE — 10060 I&D ABSCESS SIMPLE/SINGLE: CPT

## 2021-03-18 PROCEDURE — 82962 GLUCOSE BLOOD TEST: CPT

## 2021-03-18 RX ORDER — DIPHENHYDRAMINE HCL 50 MG
25 CAPSULE ORAL ONCE
Refills: 0 | Status: COMPLETED | OUTPATIENT
Start: 2021-03-18 | End: 2021-03-18

## 2021-03-18 RX ORDER — FLUCONAZOLE 150 MG/1
1 TABLET ORAL
Qty: 7 | Refills: 0
Start: 2021-03-18 | End: 2021-03-24

## 2021-03-18 RX ORDER — MAGNESIUM SULFATE 500 MG/ML
1 VIAL (ML) INJECTION ONCE
Refills: 0 | Status: COMPLETED | OUTPATIENT
Start: 2021-03-18 | End: 2021-03-18

## 2021-03-18 RX ORDER — NYSTATIN/TRIAMCINOLONE ACET
1 OINTMENT (GRAM) TOPICAL
Qty: 1 | Refills: 1
Start: 2021-03-18 | End: 2021-04-16

## 2021-03-18 RX ORDER — TRAMADOL HYDROCHLORIDE 50 MG/1
50 TABLET ORAL ONCE
Refills: 0 | Status: DISCONTINUED | OUTPATIENT
Start: 2021-03-18 | End: 2021-03-18

## 2021-03-18 RX ORDER — KETOROLAC TROMETHAMINE 30 MG/ML
15 SYRINGE (ML) INJECTION ONCE
Refills: 0 | Status: DISCONTINUED | OUTPATIENT
Start: 2021-03-18 | End: 2021-03-18

## 2021-03-18 RX ORDER — SODIUM CHLORIDE 9 MG/ML
1000 INJECTION INTRAMUSCULAR; INTRAVENOUS; SUBCUTANEOUS ONCE
Refills: 0 | Status: COMPLETED | OUTPATIENT
Start: 2021-03-18 | End: 2021-03-18

## 2021-03-18 RX ADMIN — Medication 15 MILLIGRAM(S): at 18:47

## 2021-03-18 RX ADMIN — SODIUM CHLORIDE 1000 MILLILITER(S): 9 INJECTION INTRAMUSCULAR; INTRAVENOUS; SUBCUTANEOUS at 18:46

## 2021-03-18 RX ADMIN — Medication 25 MILLIGRAM(S): at 18:47

## 2021-03-18 RX ADMIN — TRAMADOL HYDROCHLORIDE 50 MILLIGRAM(S): 50 TABLET ORAL at 17:26

## 2021-03-18 RX ADMIN — Medication 100 GRAM(S): at 18:27

## 2021-03-18 NOTE — ED ADULT NURSE NOTE - OBJECTIVE STATEMENT
Patient A&Ox4, respirations even and unlabored, ambulatory, speaks in clear and full sentences. Patient complains of vaginal swelling, white colored discharge and irritation, headache and swelling to right third finger. Patient denies chest pain, SOB, fevers or chills. Hx DM, HTN, migraines. Pending provider orders.

## 2021-03-18 NOTE — ED PROVIDER NOTE - CARE PLAN
Principal Discharge DX:	Vulvovaginitis due to Candida  Secondary Diagnosis:	Uncontrolled diabetes mellitus  Secondary Diagnosis:	Paronychia of finger  Secondary Diagnosis:	Headache  Secondary Diagnosis:	Hypertension

## 2021-03-18 NOTE — ED ADULT TRIAGE NOTE - CHIEF COMPLAINT QUOTE
x 1 week o LLQ abdominal pain, vaginal swelling, discharge and itching. LMP september. + nausea, no vomiting. denies fevers, chills, blood in stool, hematuria, diarrhea, SOB, CP,

## 2021-03-18 NOTE — ED PROVIDER NOTE - NSFOLLOWUPINSTRUCTIONS_ED_ALL_ED_FT
PARONYCHIA - AfterCare(R) Instructions(ER/ED)           Paronychia    WHAT YOU NEED TO KNOW:    Paronychia is an infection of your nail fold caused by bacteria or a fungus. The nail fold is the skin around your nail. Paronychia may happen suddenly and last for 6 weeks or longer. You may have paronychia on more than 1 finger or toe.    DISCHARGE INSTRUCTIONS:    Medicines:   •Td vaccine is a booster shot used to help prevent tetanus and diphtheria. The Td booster may be given to adolescents and adults every 10 years or for certain wounds and injuries.      •Antibiotics: This medicine will help fight or prevent an infection. It may be given as a pill, cream, or ointment.      •Steroids: This medicine will help decrease inflammation. It may be given as a pill, cream, or ointment.      •Antifungal medicine: This medicine helps kill fungus that may be causing your infection. It may be given as a cream or ointment.      •NSAIDs: These medicines decrease pain and swelling. NSAIDs are available without a doctor's order. Ask your healthcare provider which medicine is right for you. Ask how much to take and when to take it. Take as directed. NSAIDs can cause stomach bleeding and kidney problems if not taken correctly.      •Take your medicine as directed. Contact your healthcare provider if you think your medicine is not helping or if you have side effects. Tell him of her if you are allergic to any medicine. Keep a list of the medicines, vitamins, and herbs you take. Include the amounts, and when and why you take them. Bring the list or the pill bottles to follow-up visits. Carry your medicine list with you in case of an emergency.      Follow up with your healthcare provider as directed: Write down your questions so you remember to ask them during your visits.     Self-care:   •Soak your nail: Soak your nail in a mixture of equal parts vinegar and water 3 or 4 times each day. This will help decrease inflammation.      •Apply a warm compress: Soak a washcloth in warm water and place it on your nail. This will help decrease inflammation.       •Elevate: Raise your nail above the level of your heart as often as you can. This will help decrease swelling and pain. Prop your nail on pillows or blankets to keep it elevated comfortably.       •Use lotion: Apply lotion after you wash your hands. This will prevent your skin from becoming too dry.       Prevent paronychia:   •Avoid chemicals and allergens that may harm your skin and nails. This includes soaps, laundry detergents, and nail products.      •Keep your nails clean and dry. Avoid soaking your nails in water. Use cotton-lined rubber gloves or wear 2 rubber gloves if you work with food or water. The gloves will help protect your nail folds.      •Keep your nails short. Do not bite your nails, pick at your hangnails, suck your fingers, or wear fake nails. Bring your own nail tools when you go to the nail salon.      Contact your healthcare provider if:   •Your nail becomes loose, deformed, or falls off.      •You have a large abscess on your nail.      •You have questions or concerns about your condition or care.      Return to the emergency department if:   •You have severe nail pain.      •The inflammation spreads to your hand or arm.           MIGRAINE HEADACHE - General Information           Migraine Headache    WHAT YOU NEED TO KNOW:    What is a migraine headache? A migraine is a severe headache. The pain can be so severe that it interferes with your daily activities. A migraine can last a few hours up to several days. The exact cause of migraines is not known.     What can trigger a migraine headache?   •Stress, eye strain, oversleeping, or not getting enough sleep      •Hormone changes in women from birth control pills, pregnancy, menopause, or during a monthly period      •Skipping meals, going too long without eating, or not drinking enough liquids      •Certain foods or drinks such as chocolate, hard cheese, red wine, or drinks that contain caffeine      •Foods that contain gluten, nitrates, MSG, or artificial sweeteners      •Sunlight, bright or flashing lights, loud noises, smoke, or strong smells      •Heat, humidity, or changes in the weather       What are the warning signs that a migraine headache is about to start? Warning signs usually start 15 to 60 minutes before the headache:  •Visual changes (auras), such as blurred vision, temporary blind or bright spots, lines, or hallucinations      •Unusual tiredness or frequent yawning      •Tingling in an arm or leg      What are the signs and symptoms of a migraine headache? A migraine headache usually begins as a dull ache around the eye or temple. The pain may get worse with movement. You may also have the following:  •Pain in your head that may increase to the point that you cannot do everyday activities      •Pain on one or both sides of your head      •Throbbing, pulsing, or pounding pain in your head      •Nausea and vomiting      •Sensitivity to light, noise, or smells      How is a migraine headache diagnosed? Your healthcare provider will ask questions about your headaches. Describe the pain and any other symptoms, such as nausea. Tell the provider if you think anything triggered the pain. The provider will also want to know what you ate and drank before the pain started. Tell the provider about any medical conditions you have or that run in your family. Include any recent stressors you have had. You may also need any of the following:   •A neurologic exam is used to check how your pupils react to light. Your healthcare provider may check your memory, hand grasp, and balance.       •CT or MRI pictures may be taken of your brain. You may be given contrast liquid to help your brain show up better in the pictures. Tell the healthcare provider if you have ever had an allergic reaction to contrast liquid. Do not enter the MRI room with anything metal. Metal can cause serious injury. Tell the healthcare provider if you have any metal in or on your body.       How is a migraine headache treated? Migraines cannot be cured. The goal of treatment is to reduce your symptoms. Take medicine as soon as you feel a migraine begin.   •Prescription pain medicine may be given. Do not wait until the pain is severe before you take your medicine.       •Migraine medicines are used to help prevent a migraine or stop it once it starts.       •Antinausea medicine may be given to calm your stomach and to help prevent vomiting. This medicine can also help relieve pain.      What can I do to manage my symptoms?   •Rest in a dark, quiet room. This will help decrease your pain. Sleep may also help relieve the pain.      •Apply ice to decrease pain. Use an ice pack, or put crushed ice in a plastic bag. Cover the ice pack with a towel and place it on your head. Apply ice for 15 to 20 minutes every hour.      •Apply heat to decrease pain and muscle spasms. Use a small towel dampened with warm water or a heating pad, or sit in a warm bath. Apply heat on the area for 20 to 30 minutes every 2 hours. You may alternate heat and ice.      •Keep a migraine record. Write down when your migraines start and stop. Include your symptoms and what you were doing when a migraine began. Record what you ate or drank for 24 hours before the migraine started. Keep track of what you did to treat your migraine and if it worked. Bring the migraine record with you to visits with your healthcare provider.      What can I do to prevent another migraine headache?   •Do not smoke. Nicotine and other chemicals in cigarettes and cigars can trigger a migraine or make it worse. Ask your healthcare provider for information if you currently smoke and need help to quit. E-cigarettes or smokeless tobacco still contain nicotine. Talk to your healthcare provider before you use these products.       •Do not drink alcohol. Alcohol can trigger a migraine. It can also keep medicines used to treat your migraines from working.      •Get regular exercise. Exercise may help prevent migraines. Talk to your healthcare provider about the best exercise plan for you. Try to get at least 30 minutes of exercise on most days.      •Manage stress. Stress may trigger a migraine. Learn new ways to relax, such as deep breathing.      •Create a sleep schedule. Go to bed and get up at the same times each day. Do not watch television before bed.      •Eat regular meals. Include healthy foods such as include fruit, vegetables, whole-grain breads, low-fat dairy products, beans, lean meat, and fish. Do not have food or drinks that trigger your migraines.      When should I seek immediate care?   •You have a headache that seems different or much worse than your usual migraine headache.      •You have a severe headache with a fever or a stiff neck.       •You have new problems with speech, vision, balance, or movement.      •You feel like you are going to faint, you become confused, or you have a seizure.       When should I contact my healthcare provider?   •Your migraines interfere with your daily activities.       •Your medicines or treatments stop working.      •You have questions or concerns about your condition or care.      CARE AGREEMENT:    You have the right to help plan your care. Learn about your health condition and how it may be treated. Discuss treatment options with your healthcare providers to decide what care you want to receive. You always have the right to refuse treatment.         [facility]                                                                                                                                               Vaginal Yeast Infection, Adult       Vaginal yeast infection is a condition that causes vaginal discharge as well as soreness, swelling, and redness (inflammation) of the vagina. This is a common condition. Some women get this infection frequently.      What are the causes?    This condition is caused by a change in the normal balance of the yeast (candida) and bacteria that live in the vagina. This change causes an overgrowth of yeast, which causes the inflammation.      What increases the risk?  The condition is more likely to develop in women who:  •Take antibiotic medicines.      •Have diabetes.      •Take birth control pills.      •Are pregnant.      •Douche often.      •Have a weak body defense system (immune system).      •Have been taking steroid medicines for a long time.      •Frequently wear tight clothing.        What are the signs or symptoms?  Symptoms of this condition include:  •White, thick, creamy vaginal discharge.      •Swelling, itching, redness, and irritation of the vagina. The lips of the vagina (vulva) may be affected as well.      •Pain or a burning feeling while urinating.      •Pain during sex.        How is this diagnosed?  This condition is diagnosed based on:  •Your medical history.      •A physical exam.      •A pelvic exam. Your health care provider will examine a sample of your vaginal discharge under a microscope. Your health care provider may send this sample for testing to confirm the diagnosis.        How is this treated?  This condition is treated with medicine. Medicines may be over-the-counter or prescription. You may be told to use one or more of the following:  •Medicine that is taken by mouth (orally).      •Medicine that is applied as a cream (topically).      •Medicine that is inserted directly into the vagina (suppository).        Follow these instructions at home:     Lifestyle     • Do not have sex until your health care provider approves. Tell your sex partner that you have a yeast infection. That person should go to his or her health care provider and ask if they should also be treated.      • Do not wear tight clothes, such as pantyhose or tight pants.      •Wear breathable cotton underwear.      General instructions     •Take or apply over-the-counter and prescription medicines only as told by your health care provider.      •Eat more yogurt. This may help to keep your yeast infection from returning.      • Do not use tampons until your health care provider approves.      •Try taking a sitz bath to help with discomfort. This is a warm water bath that is taken while you are sitting down. The water should only come up to your hips and should cover your buttocks. Do this 3–4 times per day or as told by your health care provider.      • Do not douche.      •If you have diabetes, keep your blood sugar levels under control.      •Keep all follow-up visits as told by your health care provider. This is important.        Contact a health care provider if:    •You have a fever.      •Your symptoms go away and then return.      •Your symptoms do not get better with treatment.      •Your symptoms get worse.      •You have new symptoms.      •You develop blisters in or around your vagina.      •You have blood coming from your vagina and it is not your menstrual period.      •You develop pain in your abdomen.        Summary    •Vaginal yeast infection is a condition that causes discharge as well as soreness, swelling, and redness (inflammation) of the vagina.      •This condition is treated with medicine. Medicines may be over-the-counter or prescription.      •Take or apply over-the-counter and prescription medicines only as told by your health care provider.      • Do not douche. Do not have sex or use tampons until your health care provider approves.      •Contact a health care provider if your symptoms do not get better with treatment or your symptoms go away and then return.      This information is not intended to replace advice given to you by your health care provider. Make sure you discuss any questions you have with your health care provider.

## 2021-03-18 NOTE — ED PROVIDER NOTE - OBJECTIVE STATEMENT
42 y/o F with PMHx morbid obesity, poorly controlled Type 2 DM, HTN, asthma, migraines, presents to the ED c/o chronic genital infection and abdominal pain for the past year. Pt has been seen and treated by multiple infectious disease doctors without any improvement in her symptoms. Pt reports she was supposed to see an infectious disease doctor today, but decided to come here after noticing pain and swelling to her R 3rd finger after cutting the cuticle. Denies fever, chills, cough, or SOB. Pt mentions she has been under the care of an endocrinologist and her hemoglobin A1C has improved from 16 last year to around 10 this year. Pt also mentioned she was seen by a gynecologist last year but her doctor 'forgot to send the medication' and is scheduled to follow up with her gyn next week. 40 y/o F with PMHx morbid obesity, poorly controlled Type 2 DM, HTN, asthma, migraines, presents to the ED c/o chronic genital infection and abdominal pain for the past year. Pt states she has been seen and treated by multiple infectious disease doctors for recurrent yeast ifection without any improvement in her symptoms. Pt reports she was supposed to see an infectious disease doctor today, but decided to come here after noticing pain and swelling to her R 3rd finger after cutting the cuticle. Denies fever, chills, cough, or SOB. Pt mentions she has been under the care of an endocrinologist and her hemoglobin A1C has improved from 16 last year to around 10 this year. Pt also mentioned she was seen by a gynecologist last week but her doctor 'forgot to send the medication' and is scheduled to follow up with her gyn next week.

## 2021-03-18 NOTE — ED PROVIDER NOTE - GENITOURINARY, MLM
No  lesions, no sores, diffuse edema and erythema, pruritis, white vaginal discharge+, no CMT, no uterine tenderness, no adnexal mass palpated b/l No  lesions, no sores, diffuse edema and erythema, pruritis+, white vaginal discharge+, no CMT, no uterine tenderness, no adnexal mass palpated b/l

## 2021-03-18 NOTE — ED PROVIDER NOTE - PROGRESS NOTE DETAILS
Director - pt signed out to Dr Cox; RACHELE Sweet continuing to follow. BP is trending down. pt c/o headache since she had no food today and less hydration

## 2021-03-18 NOTE — ED PROVIDER NOTE - PATIENT PORTAL LINK FT
You can access the FollowMyHealth Patient Portal offered by [facility] by registering at the following website: [email or web address]. By joining [facility]’s FollowMyHealth portal, you will also be able to view your health information using other applications (apps) compatible with our system.

## 2021-03-18 NOTE — ED PROVIDER NOTE - ATTENDING CONTRIBUTION TO CARE
42 yo female h/o morbid obesity, poorly controlled Type 2 DM, HTN, asthma, migraines noncompliant w meds c/o 1 y yeast infection - eval by gyn and id w/o improvement; just seen by gyn and has rx but has not filled it yet.  Pt came to ed today c/o R middle finger infection w paronychia on exam.  Pt noted to have high bp - states typical for her.  Pt also c/o ha typical of her migraine. Plan I and D for paronychia, meds for yeast, meds for ha - reassess bp after meds; no gross neuro deficits - low concern for ich/cva.  Reassess. 40 yo female h/o morbid obesity, poorly controlled Type 2 DM, HTN, asthma, migraines noncompliant w meds c/o 1 y yeast infection - eval by gyn and id w/o improvement; just seen by gyn and has rx but has not filled it yet.  Pt came to ed today c/o R middle finger infection w paronychia on exam.  Pt noted to have high bp - states typical for her.  Pt also c/o ha typical of her migraine. Pt w admit in Nov 2020 for htn, dm, yeast similar to current sx.  Pt had mri/ct's w/o sig abnl to eval durbin, felt to have migraines.  Pt reports compliance w bp meds today.  Plan I and D for paronychia, meds for yeast, meds for ha - reassess bp after meds; no gross neuro deficits - low concern for ich/cva.  Reassess.

## 2021-03-18 NOTE — ED PROVIDER NOTE - CLINICAL SUMMARY MEDICAL DECISION MAKING FREE TEXT BOX
42 yo female with h/o HTN, poorly controlled DM2, chronic candidal vaginitis, migranes, present to ER c/o pain to right 3rd fingertip. exam findings consistent with paronychia that has been drained at bedside.   Pelvic exam done- most likely chronic vulvovaginitis due to candida+, no clinical suspicion /findings for PID. pt also improved after treatment of her typical migraine HA.  D/c home comfortable. importance of better control of blood glucose level and BP explained. will  d/c home with meds for vaginitis. f/u with GYM as scheduled next week recommended. pt agrees with a plan and verbalized understanding.

## 2021-03-29 ENCOUNTER — APPOINTMENT (OUTPATIENT)
Dept: ENDOCRINOLOGY | Facility: CLINIC | Age: 41
End: 2021-03-29
Payer: MEDICAID

## 2021-03-29 VITALS
TEMPERATURE: 97.3 F | WEIGHT: 293 LBS | BODY MASS INDEX: 47.09 KG/M2 | HEIGHT: 66 IN | DIASTOLIC BLOOD PRESSURE: 109 MMHG | SYSTOLIC BLOOD PRESSURE: 199 MMHG | HEART RATE: 113 BPM

## 2021-03-29 PROCEDURE — 99072 ADDL SUPL MATRL&STAF TM PHE: CPT

## 2021-03-29 PROCEDURE — 99215 OFFICE O/P EST HI 40 MIN: CPT

## 2021-03-29 NOTE — PHYSICAL EXAM
[Alert] : alert [Well Nourished] : well nourished [No Acute Distress] : no acute distress [EOMI] : extra ocular movement intact [Normal Hearing] : hearing was normal [No Respiratory Distress] : no respiratory distress [No Accessory Muscle Use] : no accessory muscle use [Clear to Auscultation] : lungs were clear to auscultation bilaterally [Normal S1, S2] : normal S1 and S2 [Normal Rate] : heart rate was normal [Normal Bowel Sounds] : normal bowel sounds [Not Tender] : non-tender [Soft] : abdomen soft [Normal Gait] : normal gait [Normal Strength/Tone] : muscle strength and tone were normal [No Motor Deficits] : the motor exam was normal [Normal Affect] : the affect was normal [Normal Insight/Judgement] : insight and judgment were intact [Normal Mood] : the mood was normal [de-identified] : BMI 49

## 2021-03-29 NOTE — REVIEW OF SYSTEMS
[Fatigue] : no fatigue [Decreased Appetite] : appetite not decreased [Dysphagia] : no dysphagia [Dysphonia] : no dysphonia [Shortness Of Breath] : no shortness of breath [Nausea] : no nausea [Vomiting] : no vomiting [Irregular Menses] : regular menses [Headaches] : no headaches [Tremors] : no tremors [Depression] : no depression

## 2021-03-29 NOTE — HISTORY OF PRESENT ILLNESS
[FreeTextEntry1] : Patient is a 40 yo woman with uncontrolled type 2 diabetes, HTN and HLD here for diabetes follow up\par \par Type 2 diabetes diagnosed in 2005 based on blood work. In 2007, she was started metformin 1000 mg BID. In 2009, patient was pregnant and started on glyburide- did not require insulin for pregnancy. States that when she lost weight, diabetes got worse.\par Check sugars 3-4 times a day\par When pain is controlled, sugars are improved\par AM: 180-90\par On a "good day" 220's after eating. With pain, sugars can increase to the 300's\par U500 150 TID and admelog 40-50 for correciton when sugars are running high. Takes a "little bit because high sugars scare me."\par Never had weight loss surgery but waiting to obtain gastric surgery\par Breakfast: skips, has some nausea\par Lunch: crackers, salads; white rice, long-grain rice with garlic butter + soy sauce; does not eat food\par Dinner: vegetables, [place] food with rice\par Does not like chocolate\par Drinks soda: used to drink 2 liters of Coke/Pepsi but now has cut back. Has water with lemon\par Dilated eye exam: has an appointment; left eye retinopathy\par Since the last visit, she was diagnosed with H. Pylori infection which is being treated\par She had recent biopsy of breast nodules and she is having a lot of pain.  Two titanium clips were left inside and results are pending. There may be plans for a lumpectomy but she's unclear at this time.\par Patient also had a left middle finger infection, went to the ED for drainage and it is healing well at this time

## 2021-03-29 NOTE — ASSESSMENT
[FreeTextEntry1] : Patient is a 42 yo woman with uncontrolled type 2 diabetes, class III obese, HTN here for follow up.\par \par 1. Uncontrolled T2DM, A1c of 12.5% with recent skin infection\par -patient states adherence to u500 150 TID with admelog as needed for hyperglycemic episodes. When she is not in pain, sugars are in the 100 to low 200 ranges.  When she has pain episodes, sugars can spike to 300s\par -for now, repeat serum A1c\par -anticipate she will need higher doses of u500 \par -patient to continue with SMBG, does not want CGM\par -again, patient if optimized should proceed with bariatric surgery as she has infections, sleep disturbances and elevated BP. The risks and benefits of metabolic surgery can be discussed with surgeon\par -pulmonary referral to evaluate for sleep apnea, referral provided today\par -continue with consistent carbohydrate diet\par -check CMP as well\par -continue metformin and trulicity\par \par 2. Class III obese\par -patient to follow up with surgery to discuss next steps\par -obtaining goal A1c may not happen in a realistic time-frame due to severe insulin resistance\par -she is on high doses of u500 with minimal improvement\par -it may be that the metabolic surgery is needed in order to improve insulin sensitivity \par \par 3. HTN\par -pain control per PCP\par -BP elevated as she has pain from breast biopsy. The site is not infected, no visible cellulitis\par \par Follow up in 3 months, sooner if needed [Diabetes Foot Care] : diabetes foot care [Long Term Vascular Complications] : long term vascular complications of diabetes [Carbohydrate Consistent Diet] : carbohydrate consistent diet [Importance of Diet and Exercise] : importance of diet and exercise to improve glycemic control, achieve weight loss and improve cardiovascular health [Exercise/Effect on Glucose] : exercise/effect on glucose [Self Monitoring of Blood Glucose] : self monitoring of blood glucose [Insulin Self-Administration] : insulin self-administration [Retinopathy Screening] : Patient was referred to ophthalmology for retinopathy screening

## 2021-03-30 LAB
ALBUMIN SERPL ELPH-MCNC: 3.5 G/DL
ALP BLD-CCNC: 157 U/L
ALT SERPL-CCNC: 33 U/L
ANION GAP SERPL CALC-SCNC: 12 MMOL/L
AST SERPL-CCNC: 48 U/L
BILIRUB SERPL-MCNC: 0.2 MG/DL
BUN SERPL-MCNC: 8 MG/DL
CALCIUM SERPL-MCNC: 8.8 MG/DL
CHLORIDE SERPL-SCNC: 100 MMOL/L
CHOLEST SERPL-MCNC: 234 MG/DL
CO2 SERPL-SCNC: 26 MMOL/L
CREAT SERPL-MCNC: 0.67 MG/DL
ESTIMATED AVERAGE GLUCOSE: 298 MG/DL
GLUCOSE SERPL-MCNC: 345 MG/DL
HBA1C MFR BLD HPLC: 12 %
HDLC SERPL-MCNC: 33 MG/DL
LDLC SERPL CALC-MCNC: 144 MG/DL
NONHDLC SERPL-MCNC: 201 MG/DL
POTASSIUM SERPL-SCNC: 4.1 MMOL/L
PROT SERPL-MCNC: 6.5 G/DL
SODIUM SERPL-SCNC: 139 MMOL/L
TRIGL SERPL-MCNC: 281 MG/DL

## 2021-04-02 ENCOUNTER — NON-APPOINTMENT (OUTPATIENT)
Age: 41
End: 2021-04-02

## 2021-04-06 ENCOUNTER — APPOINTMENT (OUTPATIENT)
Dept: BREAST CENTER | Facility: CLINIC | Age: 41
End: 2021-04-06

## 2021-04-28 ENCOUNTER — APPOINTMENT (OUTPATIENT)
Dept: BREAST CENTER | Facility: CLINIC | Age: 41
End: 2021-04-28
Payer: MEDICAID

## 2021-04-28 ENCOUNTER — APPOINTMENT (OUTPATIENT)
Dept: INTERNAL MEDICINE | Facility: CLINIC | Age: 41
End: 2021-04-28

## 2021-04-28 ENCOUNTER — APPOINTMENT (OUTPATIENT)
Dept: BREAST CENTER | Facility: CLINIC | Age: 41
End: 2021-04-28

## 2021-04-28 VITALS
SYSTOLIC BLOOD PRESSURE: 176 MMHG | HEART RATE: 106 BPM | WEIGHT: 293 LBS | BODY MASS INDEX: 47.09 KG/M2 | HEIGHT: 66 IN | DIASTOLIC BLOOD PRESSURE: 96 MMHG

## 2021-04-28 DIAGNOSIS — Z15.09 GENETIC SUSCEPTIBILITY TO OTHER MALIGNANT NEOPLASM: ICD-10-CM

## 2021-04-28 DIAGNOSIS — Z91.89 OTHER SPECIFIED PERSONAL RISK FACTORS, NOT ELSEWHERE CLASSIFIED: ICD-10-CM

## 2021-04-28 PROCEDURE — 99214 OFFICE O/P EST MOD 30 MIN: CPT

## 2021-04-28 PROCEDURE — 99072 ADDL SUPL MATRL&STAF TM PHE: CPT

## 2021-04-28 RX ORDER — TOPIRAMATE 25 MG/1
25 TABLET, FILM COATED ORAL
Qty: 90 | Refills: 1 | Status: DISCONTINUED | COMMUNITY
Start: 2020-11-18 | End: 2021-04-28

## 2021-04-28 RX ORDER — DULAGLUTIDE 1.5 MG/.5ML
1.5 INJECTION, SOLUTION SUBCUTANEOUS
Qty: 3 | Refills: 3 | Status: DISCONTINUED | COMMUNITY
Start: 2020-11-17 | End: 2021-04-28

## 2021-04-28 RX ORDER — ALBUTEROL SULFATE 90 UG/1
108 (90 BASE) INHALANT RESPIRATORY (INHALATION)
Refills: 0 | Status: DISCONTINUED | COMMUNITY
End: 2021-04-28

## 2021-04-29 NOTE — DATA REVIEWED
[FreeTextEntry1] : 3/9/2021 ([facility]) bilateral diagnostic mammogram/US showing scattered parenchymal densities, right 9:00 3cmFN 0.8 x 0.4 x 1.0cm hypoechoic palpable mass with slightly angulated margins, indeterminate recommended for US core biopsy, right 10:00 1cmFN, 1.0 x 0.3 x 0.9cm hypoechoic mass with questionable microlobulated margins, indeterminate recommended for US core biopsy. No mammographic or sonographic evidence of malignancy in left breast. Clinical follow up recommended for patients left breast pain. 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 Right 10:00 axis: nonproliferative breast changes characterized by dense stromal fibrosis (heart shaped clip), pathology results indicate that this specimen is benign concordant a 6 month follow up right mammogram/US recommended.

## 2021-04-29 NOTE — HISTORY OF PRESENT ILLNESS
[FreeTextEntry1] : HASMUKH is a 41 year old female referred by Dr. Pulliam who presents for initial evaluation regarding palpable abnormality of the right breast 9:00 3cmFN, biopsy proven intraductal papilloma found on diagnostic mammogram. She also has a strong family history of both breast and ovarian cancer-- she reports that she is both BRCA1  and BRCA2 positive, but does not have the testing results available and has not received any genetic counseling. She denies palpable masses, but states that she has fibrocystic tissue. Denies nipple discharge, skin changes, fevers, chills. \par \par Discussed importance and implication of updated genetic testing in regards to her reported BRCA1/2 positivity. Patient is aware and will go forward with genetic counseling and testing.\par \par Discussed benefits of surveillance and well as implication of the sensitivity of breast MRI. Patient understands and agrees to go forward with MRI for breast cancer surveillance.\par \par Discussed risks, benefits and alternatives of surgical excision. Risks including infection, hematoma, seroma formation, as well as infection, and wound complications, risk of need for further procedure. Patient understood risks and benefit and would like to go forward with the procedure. However, will obtain up to date imaging and MRI first.

## 2021-04-29 NOTE — PAST MEDICAL HISTORY
[Menarche Age ____] : age at menarche was [unfilled] [Definite ___ (Date)] : the last menstrual period was [unfilled] [Total Preg ___] : G[unfilled] [Live Births ___] : P[unfilled]  [Age At Live Birth ___] : Age at live birth: [unfilled] [FreeTextEntry5] : brett  [FreeTextEntry7] : yes  [FreeTextEntry8] : yes

## 2021-05-03 ENCOUNTER — APPOINTMENT (OUTPATIENT)
Dept: BREAST CENTER | Facility: CLINIC | Age: 41
End: 2021-05-03

## 2021-05-13 ENCOUNTER — APPOINTMENT (OUTPATIENT)
Dept: PULMONOLOGY | Facility: CLINIC | Age: 41
End: 2021-05-13
Payer: MEDICAID

## 2021-05-13 VITALS
OXYGEN SATURATION: 98 % | TEMPERATURE: 97.6 F | BODY MASS INDEX: 47.09 KG/M2 | HEART RATE: 116 BPM | SYSTOLIC BLOOD PRESSURE: 166 MMHG | WEIGHT: 293 LBS | HEIGHT: 66 IN | DIASTOLIC BLOOD PRESSURE: 101 MMHG

## 2021-05-13 DIAGNOSIS — J45.909 UNSPECIFIED ASTHMA, UNCOMPLICATED: ICD-10-CM

## 2021-05-13 DIAGNOSIS — F51.04 PSYCHOPHYSIOLOGIC INSOMNIA: ICD-10-CM

## 2021-05-13 DIAGNOSIS — G47.30 SLEEP APNEA, UNSPECIFIED: ICD-10-CM

## 2021-05-13 DIAGNOSIS — Z11.59 ENCOUNTER FOR SCREENING FOR OTHER VIRAL DISEASES: ICD-10-CM

## 2021-05-13 PROCEDURE — 99072 ADDL SUPL MATRL&STAF TM PHE: CPT

## 2021-05-13 PROCEDURE — 99203 OFFICE O/P NEW LOW 30 MIN: CPT

## 2021-05-13 NOTE — CONSULT LETTER
[Dear  ___] : Dear  [unfilled], [Consult Letter:] : I had the pleasure of evaluating your patient, [unfilled]. [Please see my note below.] : Please see my note below. [Consult Closing:] : Thank you very much for allowing me to participate in the care of this patient.  If you have any questions, please do not hesitate to contact me. [Sincerely,] : Sincerely, [DrFaith  ___] : Dr. PROCTOR [FreeTextEntry3] : Toshia Parrish MD\par \par [employer] [facility] [employer] [facility]\par Pulmonary, Critical Care, and Sleep Medicine\par

## 2021-05-13 NOTE — ASSESSMENT
[FreeTextEntry1] : Patient is a 40 yo F w/ PMHx of HTN, DM, Asthma who presents for pre-op evaluation for breast and bariatric surgery. Referred by Dr. Chavez. \par 1) Asthma: Diagnosed with asthma as a child. Feels SOB when exerting herself by walking up a hill or walking fast. No reports of wheezing. Unclear if this is related to asthma or weight. Reports intermittent SOB while at rest that resolves in a few min with breathing into a bag and this is most likely secondary to anxiety. CXR in 11/2020 showed no acute pathology, Will continue current albuterol PRN and obtain PFTs to evaluate for lung pathology. \par 2) Sleep disordered breathing: Patient with chronic snoring that is worse with recent weight gain, witnessed apneic episodes, frequent nighttime awakenings, awakes un-refreshed. Family history of KARY in non-obese mother. Will arrange for HST to evaluate for sleep disordered breathing. Referred to the [facility].\par \par Follow up after HST and PFTs.

## 2021-05-13 NOTE — HISTORY OF PRESENT ILLNESS
[Former] : former [Awakes Unrefreshed] : awakes unrefreshed [Daytime Somnolence] : daytime somnolence [Difficulty Maintaining Sleep] : difficulty maintaining sleep [Frequent Nocturnal Awakening] : frequent nocturnal awakening [Nonrestorative Sleep] : nonrestorative sleep [Recent  Weight Gain] : recent weight gain [Snoring] : snoring [Witnessed Apneas] : witnessed apneas [TextBox_4] : Patient is a 42 yo F w/ PMHx of HTN, DM, Asthma who presents for pre-op evaluation for breast and bariatric surgery. Patient states she was diagnosed with asthma as a child. Reports she is currently on rescue inhaler and nebulizer PRN only, using it once every 3 - 4 days only when she feels SOB when exerting herself by going up hills or walking very quickly. She sometimes feels SOB at other times but has been treating this with breathing into a paper bag with successful resolution of symptoms. No nocturnal respiratory symptoms. She denies ever doing a sleep test before, but reports chronic snoring that has gotten worse after she recently gained weight. Patient's  also reports apneic events overnight. She gets about 4-5 hours of sleep a night, has no problems with sleep initiation but has frequent night-time awakenings. No unintentional naps throughout the day. No abnormal sleep behaviors.  [Unintentional Sleep while Active] : no unintentional sleep while active [Unintentional Sleep while Inactive] : no unintentional sleep while inactive [Unusual Movements] : no unusual movements [Unusual Sleep Behavior] : no unusual sleep behavior [Irresistible urge to move legs] : does not have irresistible urge to move legs [ESS] : 12

## 2021-05-13 NOTE — PHYSICAL EXAM
[No Acute Distress] : no acute distress [Normal Oropharynx] : normal oropharynx [Normal Appearance] : normal appearance [Normal Rate/Rhythm] : normal rate/rhythm [Normal S1, S2] : normal s1, s2 [No Murmurs] : no murmurs [No Resp Distress] : no resp distress [Clear to Auscultation Bilaterally] : clear to auscultation bilaterally [No Abnormalities] : no abnormalities [Benign] : benign [Normal Gait] : normal gait [No Clubbing] : no clubbing [Normal Color/ Pigmentation] : normal color/ pigmentation [No Focal Deficits] : no focal deficits [Oriented x3] : oriented x3 [Normal Affect] : normal affect

## 2021-05-17 ENCOUNTER — APPOINTMENT (OUTPATIENT)
Dept: MRI IMAGING | Facility: HOSPITAL | Age: 41
End: 2021-05-17

## 2021-05-17 RX ORDER — PREDNISONE 50 MG/1
50 TABLET ORAL
Qty: 3 | Refills: 0 | Status: COMPLETED | COMMUNITY
Start: 2021-05-17 | End: 2021-05-18

## 2021-05-18 DIAGNOSIS — D24.9 BENIGN NEOPLASM OF UNSPECIFIED BREAST: ICD-10-CM

## 2021-05-26 ENCOUNTER — APPOINTMENT (OUTPATIENT)
Dept: BREAST CENTER | Facility: CLINIC | Age: 41
End: 2021-05-26
Payer: MEDICAID

## 2021-05-26 VITALS
SYSTOLIC BLOOD PRESSURE: 171 MMHG | DIASTOLIC BLOOD PRESSURE: 119 MMHG | WEIGHT: 293 LBS | OXYGEN SATURATION: 94 % | BODY MASS INDEX: 47.09 KG/M2 | HEART RATE: 110 BPM | HEIGHT: 66 IN

## 2021-05-26 PROCEDURE — 99213 OFFICE O/P EST LOW 20 MIN: CPT

## 2021-05-27 NOTE — REASON FOR VISIT
[Follow-Up: _____] : a [unfilled] follow-up visit [FreeTextEntry1] : right breast intraductal papilloma, fam hx of breast cancer

## 2021-05-27 NOTE — HISTORY OF PRESENT ILLNESS
[FreeTextEntry1] : 41 year old female presents for follow-up evaluation regarding palpable abnormality of the right breast 9:00 3cmFN, biopsy proven intraductal papilloma found on diagnostic mammogram. She also has a strong family history of both breast and ovarian cancer. She completed genetic testing through [facility] in 2016 and was found to be negative for any pathogenic mutations. Denies nipple discharge, skin changes, fevers, chills. \par \par She was unable to complete a breast MRI as was planned prior to surgery. A contrast mammogram has been recommended by breast radiologist and my team, however the patient has not gone through with the study. Discussed that this would help with pre op planning however the patient would like to forgo the contrast mammo.\par \par Discussed risks, benefits and alternatives of surgical excision. Risks including infection, hematoma, seroma formation, as well as infection, and wound complications, risk of need for further procedure. Patient understood risks and benefit and would like to go forward with the procedure. However, the patient states she has an "infected mesh" that she needs a surgery for from Dr. Dutta and Dr. Hollingsworth. Will discuss with them first, as a surgery for an intraabdominal infection should take precedence.

## 2021-06-04 ENCOUNTER — LABORATORY RESULT (OUTPATIENT)
Age: 41
End: 2021-06-04

## 2021-06-07 ENCOUNTER — NON-APPOINTMENT (OUTPATIENT)
Age: 41
End: 2021-06-07

## 2021-06-07 ENCOUNTER — APPOINTMENT (OUTPATIENT)
Dept: PULMONOLOGY | Facility: CLINIC | Age: 41
End: 2021-06-07
Payer: MEDICAID

## 2021-06-07 PROCEDURE — 94729 DIFFUSING CAPACITY: CPT

## 2021-06-07 PROCEDURE — 94060 EVALUATION OF WHEEZING: CPT

## 2021-06-07 PROCEDURE — 94726 PLETHYSMOGRAPHY LUNG VOLUMES: CPT

## 2021-06-15 ENCOUNTER — OUTPATIENT (OUTPATIENT)
Dept: OUTPATIENT SERVICES | Facility: HOSPITAL | Age: 41
LOS: 1 days | End: 2021-06-15
Payer: COMMERCIAL

## 2021-06-15 ENCOUNTER — APPOINTMENT (OUTPATIENT)
Dept: SLEEP CENTER | Facility: HOME HEALTH | Age: 41
End: 2021-06-15
Payer: MEDICAID

## 2021-06-15 DIAGNOSIS — Z98.890 OTHER SPECIFIED POSTPROCEDURAL STATES: Chronic | ICD-10-CM

## 2021-06-15 DIAGNOSIS — Z98.89 OTHER SPECIFIED POSTPROCEDURAL STATES: Chronic | ICD-10-CM

## 2021-06-15 PROCEDURE — 95800 SLP STDY UNATTENDED: CPT | Mod: 26

## 2021-06-15 PROCEDURE — 95800 SLP STDY UNATTENDED: CPT

## 2021-06-16 DIAGNOSIS — G47.33 OBSTRUCTIVE SLEEP APNEA (ADULT) (PEDIATRIC): ICD-10-CM

## 2021-06-28 ENCOUNTER — APPOINTMENT (OUTPATIENT)
Dept: ENDOCRINOLOGY | Facility: CLINIC | Age: 41
End: 2021-06-28

## 2021-07-01 ENCOUNTER — OUTPATIENT (OUTPATIENT)
Dept: OUTPATIENT SERVICES | Facility: HOSPITAL | Age: 41
LOS: 1 days | End: 2021-07-01
Payer: MEDICAID

## 2021-07-01 DIAGNOSIS — Z98.89 OTHER SPECIFIED POSTPROCEDURAL STATES: Chronic | ICD-10-CM

## 2021-07-01 DIAGNOSIS — Z98.890 OTHER SPECIFIED POSTPROCEDURAL STATES: Chronic | ICD-10-CM

## 2021-07-04 ENCOUNTER — INPATIENT (INPATIENT)
Facility: HOSPITAL | Age: 41
LOS: 4 days | Discharge: HOME HEALTH | DRG: 872 | End: 2021-07-09
Attending: INTERNAL MEDICINE | Admitting: STUDENT IN AN ORGANIZED HEALTH CARE EDUCATION/TRAINING PROGRAM
Payer: COMMERCIAL

## 2021-07-04 VITALS
RESPIRATION RATE: 18 BRPM | HEIGHT: 67 IN | WEIGHT: 293 LBS | SYSTOLIC BLOOD PRESSURE: 173 MMHG | HEART RATE: 132 BPM | TEMPERATURE: 99 F | DIASTOLIC BLOOD PRESSURE: 108 MMHG | OXYGEN SATURATION: 99 %

## 2021-07-04 DIAGNOSIS — I10 ESSENTIAL (PRIMARY) HYPERTENSION: ICD-10-CM

## 2021-07-04 DIAGNOSIS — R63.8 OTHER SYMPTOMS AND SIGNS CONCERNING FOOD AND FLUID INTAKE: ICD-10-CM

## 2021-07-04 DIAGNOSIS — N39.0 URINARY TRACT INFECTION, SITE NOT SPECIFIED: ICD-10-CM

## 2021-07-04 DIAGNOSIS — E78.5 HYPERLIPIDEMIA, UNSPECIFIED: ICD-10-CM

## 2021-07-04 DIAGNOSIS — A41.9 SEPSIS, UNSPECIFIED ORGANISM: ICD-10-CM

## 2021-07-04 DIAGNOSIS — Z98.89 OTHER SPECIFIED POSTPROCEDURAL STATES: Chronic | ICD-10-CM

## 2021-07-04 DIAGNOSIS — R51.9 HEADACHE, UNSPECIFIED: ICD-10-CM

## 2021-07-04 DIAGNOSIS — Z98.890 OTHER SPECIFIED POSTPROCEDURAL STATES: Chronic | ICD-10-CM

## 2021-07-04 DIAGNOSIS — K46.9 UNSPECIFIED ABDOMINAL HERNIA WITHOUT OBSTRUCTION OR GANGRENE: ICD-10-CM

## 2021-07-04 DIAGNOSIS — E66.9 OBESITY, UNSPECIFIED: ICD-10-CM

## 2021-07-04 DIAGNOSIS — E11.9 TYPE 2 DIABETES MELLITUS WITHOUT COMPLICATIONS: ICD-10-CM

## 2021-07-04 LAB
A1C WITH ESTIMATED AVERAGE GLUCOSE RESULT: 10.9 % — HIGH (ref 4–5.6)
ALBUMIN SERPL ELPH-MCNC: 3.5 G/DL — SIGNIFICANT CHANGE UP (ref 3.3–5)
ALBUMIN SERPL ELPH-MCNC: 3.7 G/DL — SIGNIFICANT CHANGE UP (ref 3.3–5)
ALP SERPL-CCNC: 150 U/L — HIGH (ref 40–120)
ALP SERPL-CCNC: 154 U/L — HIGH (ref 40–120)
ALT FLD-CCNC: 29 U/L — SIGNIFICANT CHANGE UP (ref 10–45)
ALT FLD-CCNC: SIGNIFICANT CHANGE UP U/L (ref 10–45)
ANION GAP SERPL CALC-SCNC: 11 MMOL/L — SIGNIFICANT CHANGE UP (ref 5–17)
ANION GAP SERPL CALC-SCNC: 15 MMOL/L — SIGNIFICANT CHANGE UP (ref 5–17)
ANION GAP SERPL CALC-SCNC: 15 MMOL/L — SIGNIFICANT CHANGE UP (ref 5–17)
APPEARANCE UR: ABNORMAL
APTT BLD: 29.6 SEC — SIGNIFICANT CHANGE UP (ref 27.5–35.5)
AST SERPL-CCNC: 31 U/L — SIGNIFICANT CHANGE UP (ref 10–40)
AST SERPL-CCNC: SIGNIFICANT CHANGE UP U/L (ref 10–40)
BACTERIA # UR AUTO: PRESENT /HPF
BASE EXCESS BLDV CALC-SCNC: 3.9 MMOL/L — HIGH (ref -2–3)
BASOPHILS # BLD AUTO: 0.03 K/UL — SIGNIFICANT CHANGE UP (ref 0–0.2)
BASOPHILS NFR BLD AUTO: 0.4 % — SIGNIFICANT CHANGE UP (ref 0–2)
BILIRUB SERPL-MCNC: 0.2 MG/DL — SIGNIFICANT CHANGE UP (ref 0.2–1.2)
BILIRUB SERPL-MCNC: 0.3 MG/DL — SIGNIFICANT CHANGE UP (ref 0.2–1.2)
BILIRUB UR-MCNC: NEGATIVE — SIGNIFICANT CHANGE UP
BUN SERPL-MCNC: 8 MG/DL — SIGNIFICANT CHANGE UP (ref 7–23)
BUN SERPL-MCNC: 9 MG/DL — SIGNIFICANT CHANGE UP (ref 7–23)
BUN SERPL-MCNC: 9 MG/DL — SIGNIFICANT CHANGE UP (ref 7–23)
CALCIUM SERPL-MCNC: 8.1 MG/DL — LOW (ref 8.4–10.5)
CALCIUM SERPL-MCNC: 8.8 MG/DL — SIGNIFICANT CHANGE UP (ref 8.4–10.5)
CALCIUM SERPL-MCNC: 9 MG/DL — SIGNIFICANT CHANGE UP (ref 8.4–10.5)
CHLORIDE SERPL-SCNC: 103 MMOL/L — SIGNIFICANT CHANGE UP (ref 96–108)
CHLORIDE SERPL-SCNC: 94 MMOL/L — LOW (ref 96–108)
CHLORIDE SERPL-SCNC: 94 MMOL/L — LOW (ref 96–108)
CO2 BLDV-SCNC: 28.9 MMOL/L — HIGH (ref 22–26)
CO2 SERPL-SCNC: 22 MMOL/L — SIGNIFICANT CHANGE UP (ref 22–31)
CO2 SERPL-SCNC: 24 MMOL/L — SIGNIFICANT CHANGE UP (ref 22–31)
CO2 SERPL-SCNC: 25 MMOL/L — SIGNIFICANT CHANGE UP (ref 22–31)
COLOR SPEC: YELLOW — SIGNIFICANT CHANGE UP
COMMENT - URINE: SIGNIFICANT CHANGE UP
CREAT SERPL-MCNC: 0.62 MG/DL — SIGNIFICANT CHANGE UP (ref 0.5–1.3)
CREAT SERPL-MCNC: 0.68 MG/DL — SIGNIFICANT CHANGE UP (ref 0.5–1.3)
CREAT SERPL-MCNC: 0.68 MG/DL — SIGNIFICANT CHANGE UP (ref 0.5–1.3)
DIFF PNL FLD: ABNORMAL
EOSINOPHIL # BLD AUTO: 0.03 K/UL — SIGNIFICANT CHANGE UP (ref 0–0.5)
EOSINOPHIL NFR BLD AUTO: 0.4 % — SIGNIFICANT CHANGE UP (ref 0–6)
EPI CELLS # UR: ABNORMAL /HPF (ref 0–5)
ESTIMATED AVERAGE GLUCOSE: 266 MG/DL — HIGH (ref 68–114)
GAS PNL BLDV: SIGNIFICANT CHANGE UP
GLUCOSE BLDC GLUCOMTR-MCNC: 328 MG/DL — HIGH (ref 70–99)
GLUCOSE BLDC GLUCOMTR-MCNC: 345 MG/DL — HIGH (ref 70–99)
GLUCOSE BLDC GLUCOMTR-MCNC: 370 MG/DL — HIGH (ref 70–99)
GLUCOSE BLDC GLUCOMTR-MCNC: 460 MG/DL — CRITICAL HIGH (ref 70–99)
GLUCOSE BLDC GLUCOMTR-MCNC: 465 MG/DL — CRITICAL HIGH (ref 70–99)
GLUCOSE BLDC GLUCOMTR-MCNC: 495 MG/DL — CRITICAL HIGH (ref 70–99)
GLUCOSE SERPL-MCNC: 330 MG/DL — HIGH (ref 70–99)
GLUCOSE SERPL-MCNC: 405 MG/DL — HIGH (ref 70–99)
GLUCOSE SERPL-MCNC: 443 MG/DL — HIGH (ref 70–99)
GLUCOSE UR QL: >=1000
HCO3 BLDV-SCNC: 28 MMOL/L — SIGNIFICANT CHANGE UP (ref 22–29)
HCT VFR BLD CALC: 41.9 % — SIGNIFICANT CHANGE UP (ref 34.5–45)
HGB BLD-MCNC: 13.5 G/DL — SIGNIFICANT CHANGE UP (ref 11.5–15.5)
IMM GRANULOCYTES NFR BLD AUTO: 0.5 % — SIGNIFICANT CHANGE UP (ref 0–1.5)
INR BLD: 1.21 — HIGH (ref 0.88–1.16)
KETONES UR-MCNC: NEGATIVE — SIGNIFICANT CHANGE UP
LACTATE SERPL-SCNC: 1.7 MMOL/L — SIGNIFICANT CHANGE UP (ref 0.5–2)
LACTATE SERPL-SCNC: 2.2 MMOL/L — HIGH (ref 0.5–2)
LACTATE SERPL-SCNC: 3 MMOL/L — HIGH (ref 0.5–2)
LACTATE SERPL-SCNC: 3.5 MMOL/L — HIGH (ref 0.5–2)
LACTATE SERPL-SCNC: 3.9 MMOL/L — HIGH (ref 0.5–2)
LEUKOCYTE ESTERASE UR-ACNC: ABNORMAL
LYMPHOCYTES # BLD AUTO: 1.32 K/UL — SIGNIFICANT CHANGE UP (ref 1–3.3)
LYMPHOCYTES # BLD AUTO: 18 % — SIGNIFICANT CHANGE UP (ref 13–44)
MCHC RBC-ENTMCNC: 26.5 PG — LOW (ref 27–34)
MCHC RBC-ENTMCNC: 32.2 GM/DL — SIGNIFICANT CHANGE UP (ref 32–36)
MCV RBC AUTO: 82.2 FL — SIGNIFICANT CHANGE UP (ref 80–100)
MONOCYTES # BLD AUTO: 0.52 K/UL — SIGNIFICANT CHANGE UP (ref 0–0.9)
MONOCYTES NFR BLD AUTO: 7.1 % — SIGNIFICANT CHANGE UP (ref 2–14)
NEUTROPHILS # BLD AUTO: 5.38 K/UL — SIGNIFICANT CHANGE UP (ref 1.8–7.4)
NEUTROPHILS NFR BLD AUTO: 73.6 % — SIGNIFICANT CHANGE UP (ref 43–77)
NITRITE UR-MCNC: NEGATIVE — SIGNIFICANT CHANGE UP
NRBC # BLD: 0 /100 WBCS — SIGNIFICANT CHANGE UP (ref 0–0)
PCO2 BLDV: 38 MMHG — LOW (ref 39–42)
PH BLDV: 7.47 — HIGH (ref 7.32–7.43)
PH UR: 6 — SIGNIFICANT CHANGE UP (ref 5–8)
PLATELET # BLD AUTO: 294 K/UL — SIGNIFICANT CHANGE UP (ref 150–400)
PO2 BLDV: 33 MMHG — SIGNIFICANT CHANGE UP
POTASSIUM SERPL-MCNC: 3.5 MMOL/L — SIGNIFICANT CHANGE UP (ref 3.5–5.3)
POTASSIUM SERPL-MCNC: 3.7 MMOL/L — SIGNIFICANT CHANGE UP (ref 3.5–5.3)
POTASSIUM SERPL-MCNC: SIGNIFICANT CHANGE UP MMOL/L (ref 3.5–5.3)
POTASSIUM SERPL-SCNC: 3.5 MMOL/L — SIGNIFICANT CHANGE UP (ref 3.5–5.3)
POTASSIUM SERPL-SCNC: 3.7 MMOL/L — SIGNIFICANT CHANGE UP (ref 3.5–5.3)
POTASSIUM SERPL-SCNC: SIGNIFICANT CHANGE UP MMOL/L (ref 3.5–5.3)
PROT SERPL-MCNC: 7.6 G/DL — SIGNIFICANT CHANGE UP (ref 6–8.3)
PROT SERPL-MCNC: 8 G/DL — SIGNIFICANT CHANGE UP (ref 6–8.3)
PROT UR-MCNC: NEGATIVE MG/DL — SIGNIFICANT CHANGE UP
PROTHROM AB SERPL-ACNC: 14.4 SEC — HIGH (ref 10.6–13.6)
RBC # BLD: 5.1 M/UL — SIGNIFICANT CHANGE UP (ref 3.8–5.2)
RBC # FLD: 14.3 % — SIGNIFICANT CHANGE UP (ref 10.3–14.5)
RBC CASTS # UR COMP ASSIST: ABNORMAL /HPF
SAO2 % BLDV: 62.5 % — SIGNIFICANT CHANGE UP
SARS-COV-2 RNA SPEC QL NAA+PROBE: SIGNIFICANT CHANGE UP
SODIUM SERPL-SCNC: 131 MMOL/L — LOW (ref 135–145)
SODIUM SERPL-SCNC: 134 MMOL/L — LOW (ref 135–145)
SODIUM SERPL-SCNC: 138 MMOL/L — SIGNIFICANT CHANGE UP (ref 135–145)
SP GR SPEC: 1.01 — SIGNIFICANT CHANGE UP (ref 1–1.03)
UROBILINOGEN FLD QL: 0.2 E.U./DL — SIGNIFICANT CHANGE UP
WBC # BLD: 7.32 K/UL — SIGNIFICANT CHANGE UP (ref 3.8–10.5)
WBC # FLD AUTO: 7.32 K/UL — SIGNIFICANT CHANGE UP (ref 3.8–10.5)
WBC UR QL: > 10 /HPF

## 2021-07-04 PROCEDURE — 99285 EMERGENCY DEPT VISIT HI MDM: CPT

## 2021-07-04 PROCEDURE — 93010 ELECTROCARDIOGRAM REPORT: CPT

## 2021-07-04 PROCEDURE — 70450 CT HEAD/BRAIN W/O DYE: CPT | Mod: 26,MA

## 2021-07-04 PROCEDURE — 74177 CT ABD & PELVIS W/CONTRAST: CPT | Mod: 26,MA

## 2021-07-04 PROCEDURE — 99223 1ST HOSP IP/OBS HIGH 75: CPT | Mod: GC

## 2021-07-04 PROCEDURE — 99222 1ST HOSP IP/OBS MODERATE 55: CPT

## 2021-07-04 PROCEDURE — 71045 X-RAY EXAM CHEST 1 VIEW: CPT | Mod: 26

## 2021-07-04 RX ORDER — SODIUM CHLORIDE 9 MG/ML
1000 INJECTION, SOLUTION INTRAVENOUS
Refills: 0 | Status: DISCONTINUED | OUTPATIENT
Start: 2021-07-04 | End: 2021-07-09

## 2021-07-04 RX ORDER — ACETAMINOPHEN 500 MG
975 TABLET ORAL ONCE
Refills: 0 | Status: COMPLETED | OUTPATIENT
Start: 2021-07-04 | End: 2021-07-04

## 2021-07-04 RX ORDER — DIPHENHYDRAMINE HCL 50 MG
25 CAPSULE ORAL ONCE
Refills: 0 | Status: COMPLETED | OUTPATIENT
Start: 2021-07-04 | End: 2021-07-04

## 2021-07-04 RX ORDER — INSULIN LISPRO 100/ML
VIAL (ML) SUBCUTANEOUS EVERY 4 HOURS
Refills: 0 | Status: DISCONTINUED | OUTPATIENT
Start: 2021-07-04 | End: 2021-07-06

## 2021-07-04 RX ORDER — KETOROLAC TROMETHAMINE 30 MG/ML
30 SYRINGE (ML) INJECTION ONCE
Refills: 0 | Status: DISCONTINUED | OUTPATIENT
Start: 2021-07-04 | End: 2021-07-04

## 2021-07-04 RX ORDER — DEXTROSE 50 % IN WATER 50 %
12.5 SYRINGE (ML) INTRAVENOUS ONCE
Refills: 0 | Status: DISCONTINUED | OUTPATIENT
Start: 2021-07-04 | End: 2021-07-09

## 2021-07-04 RX ORDER — LOSARTAN POTASSIUM 100 MG/1
50 TABLET, FILM COATED ORAL DAILY
Refills: 0 | Status: DISCONTINUED | OUTPATIENT
Start: 2021-07-05 | End: 2021-07-09

## 2021-07-04 RX ORDER — INSULIN HUMAN 100 [IU]/ML
8 INJECTION, SOLUTION SUBCUTANEOUS ONCE
Refills: 0 | Status: COMPLETED | OUTPATIENT
Start: 2021-07-04 | End: 2021-07-04

## 2021-07-04 RX ORDER — FLUCONAZOLE 150 MG/1
150 TABLET ORAL ONCE
Refills: 0 | Status: COMPLETED | OUTPATIENT
Start: 2021-07-04 | End: 2021-07-04

## 2021-07-04 RX ORDER — LOSARTAN POTASSIUM 100 MG/1
50 TABLET, FILM COATED ORAL DAILY
Refills: 0 | Status: DISCONTINUED | OUTPATIENT
Start: 2021-07-04 | End: 2021-07-04

## 2021-07-04 RX ORDER — DEXTROSE 50 % IN WATER 50 %
25 SYRINGE (ML) INTRAVENOUS ONCE
Refills: 0 | Status: DISCONTINUED | OUTPATIENT
Start: 2021-07-04 | End: 2021-07-09

## 2021-07-04 RX ORDER — INSULIN LISPRO 100/ML
40 VIAL (ML) SUBCUTANEOUS
Refills: 0 | Status: DISCONTINUED | OUTPATIENT
Start: 2021-07-04 | End: 2021-07-06

## 2021-07-04 RX ORDER — INSULIN GLARGINE 100 [IU]/ML
65 INJECTION, SOLUTION SUBCUTANEOUS EVERY MORNING
Refills: 0 | Status: DISCONTINUED | OUTPATIENT
Start: 2021-07-05 | End: 2021-07-05

## 2021-07-04 RX ORDER — DIPHENHYDRAMINE HCL 50 MG
50 CAPSULE ORAL ONCE
Refills: 0 | Status: COMPLETED | OUTPATIENT
Start: 2021-07-04 | End: 2021-07-04

## 2021-07-04 RX ORDER — DEXTROSE 50 % IN WATER 50 %
15 SYRINGE (ML) INTRAVENOUS ONCE
Refills: 0 | Status: DISCONTINUED | OUTPATIENT
Start: 2021-07-04 | End: 2021-07-09

## 2021-07-04 RX ORDER — AMLODIPINE BESYLATE 2.5 MG/1
10 TABLET ORAL DAILY
Refills: 0 | Status: DISCONTINUED | OUTPATIENT
Start: 2021-07-04 | End: 2021-07-04

## 2021-07-04 RX ORDER — IBUPROFEN 200 MG
800 TABLET ORAL EVERY 12 HOURS
Refills: 0 | Status: DISCONTINUED | OUTPATIENT
Start: 2021-07-04 | End: 2021-07-05

## 2021-07-04 RX ORDER — DIPHENHYDRAMINE HCL 50 MG
25 CAPSULE ORAL EVERY 6 HOURS
Refills: 0 | Status: DISCONTINUED | OUTPATIENT
Start: 2021-07-04 | End: 2021-07-06

## 2021-07-04 RX ORDER — METRONIDAZOLE 500 MG
500 TABLET ORAL ONCE
Refills: 0 | Status: COMPLETED | OUTPATIENT
Start: 2021-07-04 | End: 2021-07-04

## 2021-07-04 RX ORDER — SODIUM CHLORIDE 9 MG/ML
2000 INJECTION INTRAMUSCULAR; INTRAVENOUS; SUBCUTANEOUS ONCE
Refills: 0 | Status: COMPLETED | OUTPATIENT
Start: 2021-07-04 | End: 2021-07-04

## 2021-07-04 RX ORDER — IBUPROFEN 200 MG
600 TABLET ORAL ONCE
Refills: 0 | Status: COMPLETED | OUTPATIENT
Start: 2021-07-04 | End: 2021-07-04

## 2021-07-04 RX ORDER — GLUCAGON INJECTION, SOLUTION 0.5 MG/.1ML
1 INJECTION, SOLUTION SUBCUTANEOUS ONCE
Refills: 0 | Status: DISCONTINUED | OUTPATIENT
Start: 2021-07-04 | End: 2021-07-09

## 2021-07-04 RX ORDER — VANCOMYCIN HCL 1 G
2000 VIAL (EA) INTRAVENOUS ONCE
Refills: 0 | Status: COMPLETED | OUTPATIENT
Start: 2021-07-04 | End: 2021-07-04

## 2021-07-04 RX ORDER — CEFEPIME 1 G/1
2000 INJECTION, POWDER, FOR SOLUTION INTRAMUSCULAR; INTRAVENOUS ONCE
Refills: 0 | Status: COMPLETED | OUTPATIENT
Start: 2021-07-04 | End: 2021-07-04

## 2021-07-04 RX ORDER — INSULIN GLARGINE 100 [IU]/ML
65 INJECTION, SOLUTION SUBCUTANEOUS AT BEDTIME
Refills: 0 | Status: DISCONTINUED | OUTPATIENT
Start: 2021-07-04 | End: 2021-07-05

## 2021-07-04 RX ORDER — METFORMIN HYDROCHLORIDE 850 MG/1
1000 TABLET ORAL
Refills: 0 | Status: DISCONTINUED | OUTPATIENT
Start: 2021-07-04 | End: 2021-07-04

## 2021-07-04 RX ORDER — SODIUM CHLORIDE 9 MG/ML
1000 INJECTION INTRAMUSCULAR; INTRAVENOUS; SUBCUTANEOUS ONCE
Refills: 0 | Status: COMPLETED | OUTPATIENT
Start: 2021-07-04 | End: 2021-07-04

## 2021-07-04 RX ORDER — IBUPROFEN 200 MG
400 TABLET ORAL ONCE
Refills: 0 | Status: COMPLETED | OUTPATIENT
Start: 2021-07-04 | End: 2021-07-04

## 2021-07-04 RX ORDER — HYDRALAZINE HCL 50 MG
10 TABLET ORAL ONCE
Refills: 0 | Status: COMPLETED | OUTPATIENT
Start: 2021-07-04 | End: 2021-07-05

## 2021-07-04 RX ORDER — INSULIN GLARGINE 100 [IU]/ML
65 INJECTION, SOLUTION SUBCUTANEOUS AT BEDTIME
Refills: 0 | Status: DISCONTINUED | OUTPATIENT
Start: 2021-07-04 | End: 2021-07-04

## 2021-07-04 RX ORDER — AMLODIPINE BESYLATE 2.5 MG/1
10 TABLET ORAL DAILY
Refills: 0 | Status: DISCONTINUED | OUTPATIENT
Start: 2021-07-05 | End: 2021-07-09

## 2021-07-04 RX ORDER — FLUTICASONE PROPIONATE 50 MCG
1 SPRAY, SUSPENSION NASAL
Qty: 0 | Refills: 0 | DISCHARGE

## 2021-07-04 RX ORDER — VANCOMYCIN HCL 1 G
2000 VIAL (EA) INTRAVENOUS ONCE
Refills: 0 | Status: DISCONTINUED | OUTPATIENT
Start: 2021-07-04 | End: 2021-07-04

## 2021-07-04 RX ORDER — METOCLOPRAMIDE HCL 10 MG
10 TABLET ORAL ONCE
Refills: 0 | Status: COMPLETED | OUTPATIENT
Start: 2021-07-04 | End: 2021-07-04

## 2021-07-04 RX ORDER — ENOXAPARIN SODIUM 100 MG/ML
40 INJECTION SUBCUTANEOUS EVERY 12 HOURS
Refills: 0 | Status: DISCONTINUED | OUTPATIENT
Start: 2021-07-04 | End: 2021-07-09

## 2021-07-04 RX ORDER — SODIUM CHLORIDE 9 MG/ML
1200 INJECTION INTRAMUSCULAR; INTRAVENOUS; SUBCUTANEOUS ONCE
Refills: 0 | Status: COMPLETED | OUTPATIENT
Start: 2021-07-04 | End: 2021-07-04

## 2021-07-04 RX ORDER — CEFTRIAXONE 500 MG/1
2000 INJECTION, POWDER, FOR SOLUTION INTRAMUSCULAR; INTRAVENOUS EVERY 24 HOURS
Refills: 0 | Status: DISCONTINUED | OUTPATIENT
Start: 2021-07-04 | End: 2021-07-05

## 2021-07-04 RX ORDER — INSULIN LISPRO 100/ML
VIAL (ML) SUBCUTANEOUS
Refills: 0 | Status: DISCONTINUED | OUTPATIENT
Start: 2021-07-04 | End: 2021-07-04

## 2021-07-04 RX ADMIN — INSULIN GLARGINE 65 UNIT(S): 100 INJECTION, SOLUTION SUBCUTANEOUS at 22:29

## 2021-07-04 RX ADMIN — Medication 800 MILLIGRAM(S): at 19:26

## 2021-07-04 RX ADMIN — Medication 30 MILLIGRAM(S): at 12:27

## 2021-07-04 RX ADMIN — Medication 18: at 22:14

## 2021-07-04 RX ADMIN — Medication 8: at 15:58

## 2021-07-04 RX ADMIN — Medication 12: at 18:23

## 2021-07-04 RX ADMIN — AMLODIPINE BESYLATE 10 MILLIGRAM(S): 2.5 TABLET ORAL at 18:24

## 2021-07-04 RX ADMIN — Medication 250 MILLIGRAM(S): at 11:10

## 2021-07-04 RX ADMIN — Medication 25 MILLIGRAM(S): at 12:30

## 2021-07-04 RX ADMIN — Medication 400 MILLIGRAM(S): at 23:39

## 2021-07-04 RX ADMIN — Medication 40 UNIT(S): at 22:14

## 2021-07-04 RX ADMIN — CEFTRIAXONE 100 MILLIGRAM(S): 500 INJECTION, POWDER, FOR SOLUTION INTRAMUSCULAR; INTRAVENOUS at 19:19

## 2021-07-04 RX ADMIN — FLUCONAZOLE 150 MILLIGRAM(S): 150 TABLET ORAL at 14:02

## 2021-07-04 RX ADMIN — Medication 800 MILLIGRAM(S): at 18:30

## 2021-07-04 RX ADMIN — Medication 975 MILLIGRAM(S): at 12:27

## 2021-07-04 RX ADMIN — SODIUM CHLORIDE 4000 MILLILITER(S): 9 INJECTION INTRAMUSCULAR; INTRAVENOUS; SUBCUTANEOUS at 10:37

## 2021-07-04 RX ADMIN — Medication 30 MILLIGRAM(S): at 11:02

## 2021-07-04 RX ADMIN — Medication 100 MILLIGRAM(S): at 11:03

## 2021-07-04 RX ADMIN — Medication 600 MILLIGRAM(S): at 16:36

## 2021-07-04 RX ADMIN — INSULIN HUMAN 8 UNIT(S): 100 INJECTION, SOLUTION SUBCUTANEOUS at 14:05

## 2021-07-04 RX ADMIN — ENOXAPARIN SODIUM 40 MILLIGRAM(S): 100 INJECTION SUBCUTANEOUS at 22:13

## 2021-07-04 RX ADMIN — SODIUM CHLORIDE 2000 MILLILITER(S): 9 INJECTION INTRAMUSCULAR; INTRAVENOUS; SUBCUTANEOUS at 12:27

## 2021-07-04 RX ADMIN — Medication 50 MILLIGRAM(S): at 11:16

## 2021-07-04 RX ADMIN — LOSARTAN POTASSIUM 50 MILLIGRAM(S): 100 TABLET, FILM COATED ORAL at 18:24

## 2021-07-04 RX ADMIN — CEFEPIME 100 MILLIGRAM(S): 1 INJECTION, POWDER, FOR SOLUTION INTRAMUSCULAR; INTRAVENOUS at 10:30

## 2021-07-04 RX ADMIN — SODIUM CHLORIDE 960 MILLILITER(S): 9 INJECTION INTRAMUSCULAR; INTRAVENOUS; SUBCUTANEOUS at 14:52

## 2021-07-04 RX ADMIN — Medication 975 MILLIGRAM(S): at 10:37

## 2021-07-04 RX ADMIN — Medication 10 MILLIGRAM(S): at 11:02

## 2021-07-04 RX ADMIN — Medication 600 MILLIGRAM(S): at 17:39

## 2021-07-04 NOTE — H&P ADULT - HISTORY OF PRESENT ILLNESS
Patient is a 40 year old morbidly obese female, with PMH of uncontrolled DM2, HTN, HLD, and recent admission for vulvovaginal candidiasis (treated with fluconazole, clotrimazole) and e.Coli UTI w diffuse abd pain, gradual onset diffuse headache, vomiting, and elevated BP for one day. Around 1AM this morning she began to have chills and took her temperature which was 100.7. Her chills continued over the next few hours and her Tmax was around 3AM at 103.7F. She also developed diffuse abdominal pain but worse in the epigastric/periumbillical region and she had multiple episodes of vomit. Her vomiting was originally clear and liquid but changed to yellow. Never any blood. She also admits to 1 episode of diarrhea. No blurry vision but + headache. Denies any dysuria but admits to increased urinary frequency. LMP was 09/20. Pt was hospitalized in November for hypertensive urgency and uncontrolled Type 2 Dm. BP was managed with  losartan 50 mg daily and amlodipine 10mg daily.     Ed course:     VS: Tmax 103.3 , 136/78, 18, 98%  Labs: Na 134, HCO3- 25, Lactate 3.9->3, Glu 443--405, Alb 3.7,   Imaging: CTH neg, CT abdomen negative   Received: 3L NS, Tylenol, cefepime, flagyl, metronidazole, vanc    CTH   No acute intracranial hemorrhage or transcortical infarction.     CT abdomen:   No acute abdominal/pelvic pathology.

## 2021-07-04 NOTE — H&P ADULT - PROBLEM SELECTOR PLAN 9
Fluids: s/p 3L bolus. maintenance 150cc/hr  Electrolytes: Mg>2, K>4  Nutrition:  No IVF currently needed, replete lytes PRN  Prophylaxis:   Activity: AAT, OOBTC  GI: none  C: FC  Dispo: Admit to [facility]

## 2021-07-04 NOTE — H&P ADULT - PROBLEM SELECTOR PLAN 5
Patient reports diffuse headache onset early AM 07/4   - Will treat with motrin 800 prn   - denies visual changes or other neurologic complaints

## 2021-07-04 NOTE — ED PROVIDER NOTE - PHYSICAL EXAMINATION
CONSTITUTIONAL: Awake, alert and in no apparent distress.  HEENT: Head is atraumatic. Eyes clear bilaterally, normal EOMI. Airway patent.  CARDIAC: Normal rate, regular rhythm.  Heart sounds S1, S2.   RESPIRATORY: Breath sounds clear and equal bilaterally. no tachypnea, respiratory distress.   GASTROINTESTINAL: Abdomen soft, non-tender, no guarding, distension.  MUSCULOSKELETAL: Spine appears normal, no midline spinal tenderness, range of motion is not limited, no muscle or joint tenderness. no bony tenderness.   NEUROLOGICAL: Alert, no focal deficits, no motor or sensory deficits.  SKIN: Skin normal color for race, warm, dry and intact. No evidence of rash.  PSYCHIATRIC: Normal mood and affect. no apparent risk to self or others. CONSTITUTIONAL: Awake, alert and in no apparent distress.  HEENT: Head is atraumatic. Eyes clear bilaterally, normal EOMI. Airway patent.  CARDIAC: Normal rate, regular rhythm.  Heart sounds S1, S2.   RESPIRATORY: Breath sounds clear and equal bilaterally. no tachypnea, respiratory distress.   GASTROINTESTINAL: Abdomen soft, non-tender, no guarding, distension.  MUSCULOSKELETAL: Spine appears normal, no midline spinal tenderness, range of motion is not limited, no muscle or joint tenderness. no bony tenderness.   NEUROLOGICAL: Alert, no focal deficits, no motor or sensory deficits.  SKIN: Skin normal color for race, warm, dry and intact. No evidence of rash.  PSYCHIATRIC: Normal mood and affect. no apparent risk to self or others.  PElvic: no abnormal vaginal dc seen

## 2021-07-04 NOTE — H&P ADULT - PROBLEM SELECTOR PLAN 3
Patient has long history of uncontrolled Type II DM   - Seen in endo clinic with dr. Nisha Joseph  - patient states adherence to u500 150 TID with admelog as needed for hyperglycemic episodes.   -endo consulted who recommended   lantus  65     units BID.   lispro  sliding scale  q 4h.   if fs-199: 3 u   200-249: 6 u  250-299: 9 u  300-350: 12 U   >350 : 15 U

## 2021-07-04 NOTE — H&P ADULT - PROBLEM SELECTOR PLAN 4
Patient has a history of poorly controlled hypertension.   -C/w home meds of losartan and amlodopine

## 2021-07-04 NOTE — CONSULT NOTE ADULT - SUBJECTIVE AND OBJECTIVE BOX
HPI: 41yFemale    Age at Dx:  How dx:  Hx and duration of insulin:  Current Therapy:  Hx of hypoglycemia  Hx of DKA/HHS?    Home FSG:  Fasting  Lunch  Dinner  Bed    Hx of other regimens  Complications:  Outpatient Endo:    PMH & Surgical Hx:SEVERE SEPSIS    No pertinent family history in first degree relatives    FH: diabetes mellitus    FH: hypertension    Handoff    MEWS Score    Essential hypertension    Hyperlipidemia    Diabetes    Obesity    Hernia    Abdominal hernia    Pre-eclampsia    Severe sepsis    H/O LEEP    History of D&amp;C    H/O:     H/O ventral hernia repair    H/O exploratory laparotomy    DIZZINESS    90+    UTI (urinary tract infection)    Vaginal candidiasis    Headache    SysAdmin_VisitLink        FH:  DM:  Thyroid:  Autoimmune:  Other:    SH:  Smoking  Etoh:  Recreational Drugs:  Social Life:    Current Meds:  dextrose 40% Gel 15 Gram(s) Oral once  dextrose 5%. 1000 milliLiter(s) IV Continuous <Continuous>  dextrose 5%. 1000 milliLiter(s) IV Continuous <Continuous>  dextrose 50% Injectable 25 Gram(s) IV Push once  dextrose 50% Injectable 12.5 Gram(s) IV Push once  dextrose 50% Injectable 25 Gram(s) IV Push once  enoxaparin Injectable 40 milliGRAM(s) SubCutaneous every 12 hours  glucagon  Injectable 1 milliGRAM(s) IntraMuscular once  insulin glargine Injectable (LANTUS) 65 Unit(s) SubCutaneous every morning  insulin glargine Injectable (LANTUS) 65 Unit(s) SubCutaneous at bedtime  insulin lispro (ADMELOG) corrective regimen sliding scale   SubCutaneous Before meals and at bedtime  insulin lispro Injectable (ADMELOG) 40 Unit(s) SubCutaneous three times a day before meals  sodium chloride 0.9% Bolus 1200 milliLiter(s) IV Bolus once      Allergies:  amoxicillin (Unknown)  Bactrim I.V. (Rash (Mod to Severe))  clindamycin (Pruritus)  iodine containing compounds (Hives; Anaphylaxis)  penicillin (Hives)  shellfish. (Hives; Anaphylaxis)      ROS:  Denies the following except as indicated.    General: weight loss/weight gain, decreased appetite, fatigue  Eyes: Blurry vision, double vision, visual changes  ENT: Throat pain, changes in voice,   CV: palpitations, SOB, CP, cough  GI: NVD, difficulty swallowing, abdominal pain  : polyuria, dysuria  Endo: abnormal menses, temperature intolerance, decreased libido  MSK: weakness, joint pain  Skin: rash, dryness, diaphoresis  Heme: Easy bruising,bleeding  Neuro: HA, dizziness, lightheadedness, numbness tingling  Psych: Anxiety, Depression    Vital Signs Last 24 Hrs  T(C): 37.2 (2021 12:44), Max: 39.6 (2021 10:24)  T(F): 98.9 (2021 12:44), Max: 103.3 (2021 10:24)  HR: 110 (:44) (110 - 132)  BP: 136/78 (2021 10:24) (136/78 - 173/108)  BP(mean): --  RR: 18 (:44) (18 - 18)  SpO2: 96% (:44) (96% - 99%)  Height (cm): 170.2 ( @ 08:31)  Weight (kg): 140.2 ( @ 08:31)  BMI (kg/m2): 48.4 ( @ 08:31)      Constitutional: wn/wd in NAD.   HEENT: NCAT, MMM, OP clear, EOMI, , no proptosis or lid retraction  Neck: no thyromegaly or palpable thyroid nodules   Respiratory: lungs CTAB.  Cardiovascular: regular rhythm, normal S1 and S2, no audible murmurs, no peripheral edema  GI: soft, NT/ND, no masses/HSM appreciated.  Neurology: no tremors, DTR 2+  Skin: no visible rashes/lesions  Psychiatric: AAO x 3, normal affect/mood.  Ext: radial pulses intact, DP pulses intact, extremities warm, no cyanosis, clubbing or edema.       LABS:                        13.5   7.32  )-----------( 294      ( 2021 09:37 )             41.9     07-    134<L>  |  94<L>  |  9   ----------------------------<  405<H>  3.7   |  25  |  0.68    Ca    9.0      2021 10:50    TPro  8.0  /  Alb  3.7  /  TBili  0.3  /  DBili  x   /  AST  31  /  ALT  29  /  AlkPhos  154<H>  07-04    PT/INR - ( 2021 09:37 )   PT: 14.4 sec;   INR: 1.21          PTT - ( 2021 09:37 )  PTT:29.6 sec  Urinalysis Basic - ( 2021 09:38 )    Color: Yellow / Appearance: Hazy / S.015 / pH: x  Gluc: x / Ketone: NEGATIVE  / Bili: Negative / Urobili: 0.2 E.U./dL   Blood: x / Protein: NEGATIVE mg/dL / Nitrite: NEGATIVE   Leuk Esterase: Trace / RBC: 5-10 /HPF / WBC > 10 /HPF   Sq Epi: x / Non Sq Epi: 5-10 /HPF / Bacteria: Present /HPF        Thyroid Stimulating Hormone, Serum: 1.367 ( @ 06:07)      RADIOLOGY & ADDITIONAL STUDIES:  CAPILLARY BLOOD GLUCOSE      POCT Blood Glucose.: 370 mg/dL (2021 14:35)  POCT Blood Glucose.: 451 mg/dL (2021 13:17)  POCT Blood Glucose.: 396 mg/dL (2021 08:29)        A/P:41y Female    1.  DM  Please continue lantus       units at night / morning.  Please continue lispro      units before each meal.  Please continue lispro moderate / low dose sliding scale four times daily with meals and at bedtime    Pt's fingerstick glucose goal is     Will continue to monitor     For discharge, pt can continue    Pt can follow up at discharge with [facility] by calling  to make an appointment.   Will discuss case with     and update primary team HPI: 41yFemale, morbidly obese, with PMH of uncontrolled DM2, HTN, HLD, and recent admission for vulvovaginal candidiasis (treated with fluconazole, clotrimazole) and e.Coli UTI w diffuse abd pain, gradual onset diffuse headache, vomiting, elevated BP for one day. no blurred vision, cp, sob. states headache feels like prior migraines  Endo consulted for DM management.   Type 2 diabetes diagnosed in  based on blood work. In , she was started metformin 1000 mg BID. In , patient was pregnant and started on glyburide- did not require insulin for pregnancy. States that when she lost weight, diabetes got worse. Check sugars 3-4 times a day. When pain is controlled, sugars are improved. AM: 180-90. On a "good day" 220's after eating. With pain, sugars can increase to the 300's. U500 180 TID and admelog 40-50 for correciton when sugars are running high. Diet: Breakfast: skips, has some nausea. Lunch: crackers, salads; white rice, long-grain rice with garlic butter + soy sauce; does not eat food. Dinner: vegetables, [place] food with rice. Drinks soda: used to drink 2 liters of Coke/Pepsi but now has cut back. Has water with lemon. She reports left eye retinopathy        Hx of other regimens  Complications:  Outpatient Endo:    PMH & Surgical Hx:SEVERE SEPSIS    No pertinent family history in first degree relatives    FH: diabetes mellitus    FH: hypertension    Handoff    MEWS Score    Essential hypertension    Hyperlipidemia    Diabetes    Obesity    Hernia    Abdominal hernia    Pre-eclampsia    Severe sepsis    H/O LEEP    History of D&amp;C    H/O:     H/O ventral hernia repair    H/O exploratory laparotomy    DIZZINESS    90+    UTI (urinary tract infection)    Vaginal candidiasis    Headache    SysAdmin_VisitLink  Current Meds:  dextrose 40% Gel 15 Gram(s) Oral once  dextrose 5%. 1000 milliLiter(s) IV Continuous <Continuous>  dextrose 5%. 1000 milliLiter(s) IV Continuous <Continuous>  dextrose 50% Injectable 25 Gram(s) IV Push once  dextrose 50% Injectable 12.5 Gram(s) IV Push once  dextrose 50% Injectable 25 Gram(s) IV Push once  enoxaparin Injectable 40 milliGRAM(s) SubCutaneous every 12 hours  glucagon  Injectable 1 milliGRAM(s) IntraMuscular once  insulin glargine Injectable (LANTUS) 65 Unit(s) SubCutaneous every morning  insulin glargine Injectable (LANTUS) 65 Unit(s) SubCutaneous at bedtime  insulin lispro (ADMELOG) corrective regimen sliding scale   SubCutaneous Before meals and at bedtime  insulin lispro Injectable (ADMELOG) 40 Unit(s) SubCutaneous three times a day before meals  sodium chloride 0.9% Bolus 1200 milliLiter(s) IV Bolus once      Allergies:  amoxicillin (Unknown)  Bactrim I.V. (Rash (Mod to Severe))  clindamycin (Pruritus)  iodine containing compounds (Hives; Anaphylaxis)  penicillin (Hives)  shellfish. (Hives; Anaphylaxis)      ROS:  Denies the following except as indicated.    General: weight loss/weight gain, decreased appetite, fatigue  Eyes: Blurry vision, double vision, visual changes  ENT: Throat pain, changes in voice,   CV: palpitations, SOB, CP, cough  GI: NVD, difficulty swallowing, abdominal pain  : polyuria, dysuria  Endo: abnormal menses, temperature intolerance, decreased libido  MSK: weakness, joint pain  Skin: rash, dryness, diaphoresis  Heme: Easy bruising,bleeding  Neuro: HA, dizziness, lightheadedness, numbness tingling  Psych: Anxiety, Depression    Vital Signs Last 24 Hrs  T(C): 37.2 (2021 12:44), Max: 39.6 (2021 10:24)  T(F): 98.9 (2021 12:44), Max: 103.3 (2021 10:24)  HR: 110 (2021 12:44) (110 - 132)  BP: 136/78 (2021 10:24) (136/78 - 173/108)  BP(mean): --  RR: 18 (2021 12:44) (18 - 18)  SpO2: 96% (2021 12:44) (96% - 99%)  Height (cm): 170.2 ( @ 08:31)  Weight (kg): 140.2 ( @ 08:31)  BMI (kg/m2): 48.4 ( @ 08:31)      Constitutional: wn/wd in NAD.   HEENT: NCAT, MMM, OP clear, EOMI, , no proptosis or lid retraction  Neck: no thyromegaly or palpable thyroid nodules   Respiratory: lungs CTAB.  Cardiovascular: regular rhythm, normal S1 and S2, no audible murmurs, no peripheral edema  GI: soft, NT/ND, no masses/HSM appreciated.  Neurology: no tremors, DTR 2+  Skin: no visible rashes/lesions  Psychiatric: AAO x 3, normal affect/mood.  Ext: radial pulses intact, DP pulses intact, extremities warm, no cyanosis, clubbing or edema.       LABS:                        13.5   7.32  )-----------( 294      ( 2021 09:37 )             41.9     07-04    134<L>  |  94<L>  |  9   ----------------------------<  405<H>  3.7   |  25  |  0.68    Ca    9.0      2021 10:50    TPro  8.0  /  Alb  3.7  /  TBili  0.3  /  DBili  x   /  AST  31  /  ALT  29  /  AlkPhos  154<H>  07-04    PT/INR - ( 2021 09:37 )   PT: 14.4 sec;   INR: 1.21          PTT - ( 2021 09:37 )  PTT:29.6 sec  Urinalysis Basic - ( 2021 09:38 )    Color: Yellow / Appearance: Hazy / S.015 / pH: x  Gluc: x / Ketone: NEGATIVE  / Bili: Negative / Urobili: 0.2 E.U./dL   Blood: x / Protein: NEGATIVE mg/dL / Nitrite: NEGATIVE   Leuk Esterase: Trace / RBC: 5-10 /HPF / WBC > 10 /HPF   Sq Epi: x / Non Sq Epi: 5-10 /HPF / Bacteria: Present /HPF        Thyroid Stimulating Hormone, Serum: 1.367 ( @ 06:07)      RADIOLOGY & ADDITIONAL STUDIES:  CAPILLARY BLOOD GLUCOSE      POCT Blood Glucose.: 370 mg/dL (2021 14:35)  POCT Blood Glucose.: 451 mg/dL (2021 13:17)  POCT Blood Glucose.: 396 mg/dL (2021 08:29)        A/P:41y Female    1.  DM  Please continue lantus       units at night / morning.  Please continue lispro      units before each meal.  Please continue lispro moderate / low dose sliding scale four times daily with meals and at bedtime    Pt's fingerstick glucose goal is     Will continue to monitor     For discharge, pt can continue    Pt can follow up at discharge with [facility] by calling  to make an appointment.   Will discuss case with     and update primary team HPI: 41yFemale, morbidly obese, with PMH of uncontrolled DM2, HTN, HLD, and recent admission for vulvovaginal candidiasis (treated with fluconazole, clotrimazole) and e.Coli UTI w diffuse abd pain, gradual onset diffuse headache, vomiting, elevated BP for one day. no blurred vision, cp, sob. states headache feels like prior migraines  Endo consulted for DM management.   Type 2 diabetes diagnosed in  based on blood work. In , she was started metformin 1000 mg BID. In , patient was pregnant and started on glyburide- did not require insulin for pregnancy. Check sugars 3-4 times a day. When pain is controlled, sugars are improved. AM: 180-190. On a "good day" 220's after eating. With pain, sugars can increase to the 300-500's. U500 180 TID and admelog 40 for correciton when sugars are running high. Diet: Breakfast: skips, has some nausea. Lunch: crackers, salads; white rice, long-grain rice with garlic butter + soy sauce; does not eat food. Dinner: vegetables, [place] food with rice. Drinks soda: used to drink 2 liters of Coke/Pepsi but now has cut back. Has water with lemon. She reports left eye retinopathy        Hx of other regimens  Complications:  Outpatient Endo:    PMH & Surgical Hx:SEVERE SEPSIS    No pertinent family history in first degree relatives    FH: diabetes mellitus    FH: hypertension    Handoff    MEWS Score    Essential hypertension    Hyperlipidemia    Diabetes    Obesity    Hernia    Abdominal hernia    Pre-eclampsia    Severe sepsis    H/O LEEP    History of D&amp;C    H/O:     H/O ventral hernia repair    H/O exploratory laparotomy    DIZZINESS    90+    UTI (urinary tract infection)    Vaginal candidiasis    Headache    SysAdmin_VisitLink  Current Meds:  dextrose 40% Gel 15 Gram(s) Oral once  dextrose 5%. 1000 milliLiter(s) IV Continuous <Continuous>  dextrose 5%. 1000 milliLiter(s) IV Continuous <Continuous>  dextrose 50% Injectable 25 Gram(s) IV Push once  dextrose 50% Injectable 12.5 Gram(s) IV Push once  dextrose 50% Injectable 25 Gram(s) IV Push once  enoxaparin Injectable 40 milliGRAM(s) SubCutaneous every 12 hours  glucagon  Injectable 1 milliGRAM(s) IntraMuscular once  insulin glargine Injectable (LANTUS) 65 Unit(s) SubCutaneous every morning  insulin glargine Injectable (LANTUS) 65 Unit(s) SubCutaneous at bedtime  insulin lispro (ADMELOG) corrective regimen sliding scale   SubCutaneous Before meals and at bedtime  insulin lispro Injectable (ADMELOG) 40 Unit(s) SubCutaneous three times a day before meals  sodium chloride 0.9% Bolus 1200 milliLiter(s) IV Bolus once      Allergies:  amoxicillin (Unknown)  Bactrim I.V. (Rash (Mod to Severe))  clindamycin (Pruritus)  iodine containing compounds (Hives; Anaphylaxis)  penicillin (Hives)  shellfish. (Hives; Anaphylaxis)      ROS:  Denies the following except as indicated.    General: weight loss/weight gain, decreased appetite, fatigue  Eyes: Blurry vision, double vision, visual changes  ENT: Throat pain, changes in voice,   CV: palpitations, SOB, CP, cough  GI: NVD, difficulty swallowing, abdominal pain  : polyuria, dysuria  Endo: abnormal menses, temperature intolerance, decreased libido  MSK: weakness, joint pain  Skin: rash, dryness, diaphoresis  Heme: Easy bruising,bleeding  Neuro: HA, dizziness, lightheadedness, numbness tingling  Psych: Anxiety, Depression    Vital Signs Last 24 Hrs  T(C): 37.2 (2021 12:44), Max: 39.6 (2021 10:24)  T(F): 98.9 (2021 12:44), Max: 103.3 (2021 10:24)  HR: 110 (2021 12:44) (110 - 132)  BP: 136/78 (2021 10:24) (136/78 - 173/108)  BP(mean): --  RR: 18 (2021 12:44) (18 - 18)  SpO2: 96% (2021 12:44) (96% - 99%)  Height (cm): 170.2 ( @ 08:31)  Weight (kg): 140.2 ( @ 08:31)  BMI (kg/m2): 48.4 ( @ 08:31)      Constitutional: wn/wd in NAD.   HEENT: NCAT, MMM, OP clear, EOMI, , no proptosis or lid retraction  Neck: no thyromegaly or palpable thyroid nodules   Respiratory: lungs CTAB.  Cardiovascular: regular rhythm, normal S1 and S2, no audible murmurs, no peripheral edema  GI: soft, NT/ND, no masses/HSM appreciated.  Neurology: no tremors, DTR 2+  Skin: no visible rashes/lesions  Psychiatric: AAO x 3, normal affect/mood.  Ext: radial pulses intact, DP pulses intact, extremities warm, no cyanosis, clubbing or edema.       LABS:                        13.5   7.32  )-----------( 294      ( 2021 09:37 )             41.9     07-04    134<L>  |  94<L>  |  9   ----------------------------<  405<H>  3.7   |  25  |  0.68    Ca    9.0      2021 10:50    TPro  8.0  /  Alb  3.7  /  TBili  0.3  /  DBili  x   /  AST  31  /  ALT  29  /  AlkPhos  154<H>  07-04    PT/INR - ( 2021 09:37 )   PT: 14.4 sec;   INR: 1.21          PTT - ( 2021 09:37 )  PTT:29.6 sec  Urinalysis Basic - ( 2021 09:38 )    Color: Yellow / Appearance: Hazy / S.015 / pH: x  Gluc: x / Ketone: NEGATIVE  / Bili: Negative / Urobili: 0.2 E.U./dL   Blood: x / Protein: NEGATIVE mg/dL / Nitrite: NEGATIVE   Leuk Esterase: Trace / RBC: 5-10 /HPF / WBC > 10 /HPF   Sq Epi: x / Non Sq Epi: 5-10 /HPF / Bacteria: Present /HPF        Thyroid Stimulating Hormone, Serum: 1.367 ( @ 06:07)      RADIOLOGY & ADDITIONAL STUDIES:  CAPILLARY BLOOD GLUCOSE      POCT Blood Glucose.: 370 mg/dL (2021 14:35)  POCT Blood Glucose.: 451 mg/dL (2021 13:17)  POCT Blood Glucose.: 396 mg/dL (2021 08:29)        A/P:41y Female    1.  DM  Please continue lantus       units at night / morning.  Please continue lispro      units before each meal.  Please continue lispro moderate / low dose sliding scale four times daily with meals and at bedtime    Pt's fingerstick glucose goal is     Will continue to monitor     For discharge, pt can continue    Pt can follow up at discharge with [facility] by calling  to make an appointment.   Will discuss case with     and update primary team HPI: 41yFemale, morbidly obese, with PMH of uncontrolled DM2, HTN, HLD, and recent admission for vulvovaginal candidiasis (treated with fluconazole, clotrimazole) and e.Coli UTI w diffuse abd pain, gradual onset diffuse headache, vomiting, elevated BP for one day. states headache feels like prior migraines.   Endo consulted for DM management.   Type 2 diabetes diagnosed in  based on blood work. In , she was started metformin 1000 mg BID. In , patient was pregnant and started on glyburide- did not require insulin for pregnancy. Check sugars 3-4 times a day. When pain is controlled, sugars are improved. AM: 180-190. On a "good day" 220's after eating. With pain, sugars can increase to the 300-500's. U500 180 TID, metformin 1000 BID, ozempic weekly,  and admelog, 40 for correction when sugars are running high . Diet: Breakfast: skips, has some nausea. Lunch: crackers, salads; white rice, long-grain rice with garlic butter + soy sauce; does not eat food. Dinner: vegetables, Irish food with rice. Drinks soda: used to drink 2 liters of Coke/Pepsi but now has cut back. Has water with lemon. She reports left eye retinopathy. She follows with DR. Willingham for DM.   She reports family hx of DM in mother.   Of note, pt was admitted in 2020 and was on Lantus 65 U BID with lispro 55 U TID.     SysAdmin_VisitLink  Current Meds:  dextrose 40% Gel 15 Gram(s) Oral once  dextrose 5%. 1000 milliLiter(s) IV Continuous <Continuous>  dextrose 5%. 1000 milliLiter(s) IV Continuous <Continuous>  dextrose 50% Injectable 25 Gram(s) IV Push once  dextrose 50% Injectable 12.5 Gram(s) IV Push once  dextrose 50% Injectable 25 Gram(s) IV Push once  enoxaparin Injectable 40 milliGRAM(s) SubCutaneous every 12 hours  glucagon  Injectable 1 milliGRAM(s) IntraMuscular once  insulin glargine Injectable (LANTUS) 65 Unit(s) SubCutaneous every morning  insulin glargine Injectable (LANTUS) 65 Unit(s) SubCutaneous at bedtime  insulin lispro (ADMELOG) corrective regimen sliding scale   SubCutaneous Before meals and at bedtime  insulin lispro Injectable (ADMELOG) 40 Unit(s) SubCutaneous three times a day before meals  sodium chloride 0.9% Bolus 1200 milliLiter(s) IV Bolus once      Allergies:  amoxicillin (Unknown)  Bactrim I.V. (Rash (Mod to Severe))  clindamycin (Pruritus)  iodine containing compounds (Hives; Anaphylaxis)  penicillin (Hives)  shellfish. (Hives; Anaphylaxis)      ROS:  Denies the following except as indicated.    General: weight loss/weight gain,  fatigue  Eyes: Blurry vision, double vision, visual changes  ENT: Throat pain, changes in voice,   CV: palpitations, SOB, CP, cough  GI:  difficulty swallowing, abdominal pain  : polyuria, dysuria  MSK: weakness, joint pain  Skin: rash, dryness, diaphoresis  Heme: Easy bruising,bleeding      Vital Signs Last 24 Hrs  T(C): 37.2 (2021 12:44), Max: 39.6 (2021 10:24)  T(F): 98.9 (2021 12:44), Max: 103.3 (2021 10:24)  HR: 110 (2021 12:44) (110 - 132)  BP: 136/78 (2021 10:24) (136/78 - 173/108)  BP(mean): --  RR: 18 (2021 12:44) (18 - 18)  SpO2: 96% (2021 12:44) (96% - 99%)  Height (cm): 170.2 ( @ 08:31)  Weight (kg): 140.2 ( @ 08:31)  BMI (kg/m2): 48.4 ( @ 08:31)      Constitutional:  in mild NAD.   HEENT:  no proptosis or lid retraction  Neck: no thyromegaly or palpable thyroid nodules   Respiratory: lungs CTAB.  Cardiovascular: regular rhythm, normal S1 and S2  GI: soft, NT/ND, no masses/HSM appreciated.  Neurology: no tremors, DTR 2+  Skin: no visible rashes/lesions  Psychiatric: AAO x 3, normal affect/mood.  Ext: radial pulses intact, DP pulses intact, extremities warm, no cyanosis, clubbing or edema.       LABS:                        13.5   7.32  )-----------( 294      ( 2021 09:37 )             41.9     07-04    134<L>  |  94<L>  |  9   ----------------------------<  405<H>  3.7   |  25  |  0.68    Ca    9.0      2021 10:50    TPro  8.0  /  Alb  3.7  /  TBili  0.3  /  DBili  x   /  AST  31  /  ALT  29  /  AlkPhos  154<H>  07-04    PT/INR - ( 2021 09:37 )   PT: 14.4 sec;   INR: 1.21          PTT - ( 2021 09:37 )  PTT:29.6 sec  Urinalysis Basic - ( 2021 09:38 )    Color: Yellow / Appearance: Hazy / S.015 / pH: x  Gluc: x / Ketone: NEGATIVE  / Bili: Negative / Urobili: 0.2 E.U./dL   Blood: x / Protein: NEGATIVE mg/dL / Nitrite: NEGATIVE   Leuk Esterase: Trace / RBC: 5-10 /HPF / WBC > 10 /HPF   Sq Epi: x / Non Sq Epi: 5-10 /HPF / Bacteria: Present /HPF        Thyroid Stimulating Hormone, Serum: 1.367 ( @ 06:07)      RADIOLOGY & ADDITIONAL STUDIES:  CAPILLARY BLOOD GLUCOSE      POCT Blood Glucose.: 370 mg/dL (2021 14:35)  POCT Blood Glucose.: 451 mg/dL (2021 13:17)  POCT Blood Glucose.: 396 mg/dL (2021 08:29)        A/P:41y Female    1.  Uncontrolled type 2 DM  A1c:10.6%  wt:140, BMI: 48   Please give lantus  65     units BID.   Please give lispro  sliding scale  q 4h.   if fs-199: 3 u   200-249: 6 u  250-299: 9 u  300-350: 12 U   >350 : 15 U     Pt's fingerstick glucose goal is 100-180    Will continue to monitor     For discharge, pt can continue    Pt can follow up at discharge with Dr. willingham.    case seen and discussed with Dr. Coyle   and update primary team HPI: 41yFemale, morbidly obese, with PMH of uncontrolled DM2, HTN, HLD, and recent admission for vulvovaginal candidiasis (treated with fluconazole, clotrimazole) and e.Coli UTI w diffuse abd pain, gradual onset diffuse headache, vomiting, elevated BP for one day. states headache feels like prior migraines.   Endo consulted for DM management.   Type 2 diabetes diagnosed in  based on blood work. In , she was started metformin 1000 mg BID. In , patient was pregnant and started on glyburide- did not require insulin for pregnancy. Check sugars 3-4 times a day. When pain is controlled, sugars are improved. AM: 180-190. On a "good day" 220's after eating. With pain, sugars can increase to the 300-500's. U500 195 U before each meals, metformin 1000 BID, ozempic weekly,  and admelog, 40 for correction when sugars are running high . Diet: Breakfast: skips, has some nausea. Lunch: crackers, salads; white rice, long-grain rice with garlic butter + soy sauce; does not eat food. Dinner: vegetables, [place] food with rice. Drinks soda: used to drink 2 liters of Coke/Pepsi but now has cut back. Has water with lemon. She reports left eye retinopathy. She follows with DR. Willingham for DM.   She reports family hx of DM in mother.   Of note, pt was admitted in 2020 and was on Lantus 65 U BID with lispro 55 U TID.   labs: PH: 7.47, lactate: Na: 134, K: 3.7, lactate: 3.9. Urine: (+) for glu, neg ketones.   s/p NS 3 liters in the ED.     SysAdmin_VisitLink  Current Meds:  dextrose 40% Gel 15 Gram(s) Oral once  dextrose 5%. 1000 milliLiter(s) IV Continuous <Continuous>  dextrose 5%. 1000 milliLiter(s) IV Continuous <Continuous>  dextrose 50% Injectable 25 Gram(s) IV Push once  dextrose 50% Injectable 12.5 Gram(s) IV Push once  dextrose 50% Injectable 25 Gram(s) IV Push once  enoxaparin Injectable 40 milliGRAM(s) SubCutaneous every 12 hours  glucagon  Injectable 1 milliGRAM(s) IntraMuscular once  insulin glargine Injectable (LANTUS) 65 Unit(s) SubCutaneous every morning  insulin glargine Injectable (LANTUS) 65 Unit(s) SubCutaneous at bedtime  insulin lispro (ADMELOG) corrective regimen sliding scale   SubCutaneous Before meals and at bedtime  insulin lispro Injectable (ADMELOG) 40 Unit(s) SubCutaneous three times a day before meals  sodium chloride 0.9% Bolus 1200 milliLiter(s) IV Bolus once      Allergies:  amoxicillin (Unknown)  Bactrim I.V. (Rash (Mod to Severe))  clindamycin (Pruritus)  iodine containing compounds (Hives; Anaphylaxis)  penicillin (Hives)  shellfish. (Hives; Anaphylaxis)      ROS:  Denies the following except as indicated.    General: weight loss/weight gain,  fatigue  Eyes: Blurry vision, double vision, visual changes  ENT: Throat pain, changes in voice,   CV: palpitations, SOB, CP, cough  GI:  difficulty swallowing, abdominal pain  : polyuria, dysuria  MSK: weakness, joint pain  Skin: rash, dryness, diaphoresis  Heme: Easy bruising,bleeding      Vital Signs Last 24 Hrs  T(C): 37.2 (2021 12:44), Max: 39.6 (2021 10:24)  T(F): 98.9 (2021 12:44), Max: 103.3 (2021 10:24)  HR: 110 (2021 12:44) (110 - 132)  BP: 136/78 (2021 10:24) (136/78 - 173/108)  BP(mean): --  RR: 18 (2021 12:44) (18 - 18)  SpO2: 96% (2021 12:44) (96% - 99%)  Height (cm): 170.2 ( @ 08:31)  Weight (kg): 140.2 ( @ 08:31)  BMI (kg/m2): 48.4 ( @ 08:31)      Constitutional:  in mild NAD.   HEENT:  no proptosis or lid retraction  Neck: no thyromegaly or palpable thyroid nodules   Respiratory: lungs CTAB.  Cardiovascular: regular rhythm, normal S1 and S2  GI: soft, NT/ND, no masses/HSM appreciated.  Neurology: no tremors, DTR 2+  Skin: no visible rashes/lesions  Psychiatric: AAO x 3, normal affect/mood.  Ext: radial pulses intact, DP pulses intact, extremities warm, no cyanosis, clubbing or edema.       LABS:                        13.5   7.32  )-----------( 294      ( 2021 09:37 )             41.9     07-04    134<L>  |  94<L>  |  9   ----------------------------<  405<H>  3.7   |  25  |  0.68    Ca    9.0      2021 10:50    TPro  8.0  /  Alb  3.7  /  TBili  0.3  /  DBili  x   /  AST  31  /  ALT  29  /  AlkPhos  154<H>  07-04    PT/INR - ( 2021 09:37 )   PT: 14.4 sec;   INR: 1.21          PTT - ( 2021 09:37 )  PTT:29.6 sec  Urinalysis Basic - ( 2021 09:38 )    Color: Yellow / Appearance: Hazy / S.015 / pH: x  Gluc: x / Ketone: NEGATIVE  / Bili: Negative / Urobili: 0.2 E.U./dL   Blood: x / Protein: NEGATIVE mg/dL / Nitrite: NEGATIVE   Leuk Esterase: Trace / RBC: 5-10 /HPF / WBC > 10 /HPF   Sq Epi: x / Non Sq Epi: 5-10 /HPF / Bacteria: Present /HPF        Thyroid Stimulating Hormone, Serum: 1.367 ( @ 06:07)      RADIOLOGY & ADDITIONAL STUDIES:  CAPILLARY BLOOD GLUCOSE      POCT Blood Glucose.: 370 mg/dL (2021 14:35)  POCT Blood Glucose.: 451 mg/dL (2021 13:17)  POCT Blood Glucose.: 396 mg/dL (2021 08:29)        A/P:  41yFemale, morbidly obese, with PMH of uncontrolled DM2, HTN, HLD, and recent admission for vulvovaginal candidiasis (treated with fluconazole, clotrimazole) and e.Coli UTI w diffuse abd pain, gradual onset diffuse headache, vomiting, elevated BP for one day.   Endo consulted for DM management.     1.  Uncontrolled type 2 DM  A1c:10.6%  wt:140, BMI: 48   Please give lantus  65     units BID.   Please give lispro  sliding scale  q 4h.   if fs-199: 3 u   200-249: 6 u  250-299: 9 u  300-350: 12 U   >350 : 15 U     Pt's fingerstick glucose goal is 100-180    Will continue to monitor     For discharge, pt can continue    Pt can follow up at discharge with Dr. willingham.    case seen and discussed with Dr. Coyle   and update primary team HPI: Patient is a 41 year old morbidly obese female, with PMH of uncontrolled DM2, HTN, HLD, and recent admission for vulvovaginal candidiasis (treated with fluconazole, clotrimazole) and e.Coli UTI w diffuse abd pain, gradual onset diffuse headache, vomiting, and elevated BP for one day. Around 1AM this morning she began to have chills and took her temperature which was 100.7. Her chills continued over the next few hours and her Tmax was around 3AM at 103.7F. She also developed diffuse abdominal pain but worse in the epigastric/periumbillical region and she had multiple episodes of vomit. Her vomiting was originally clear and liquid but changed to yellow. Never any blood. She also admits to 1 episode of diarrhea. No blurry vision but + headache. Denies any dysuria but admits to increased urinary frequency. LMP was . Pt was hospitalized in November for hypertensive urgency and uncontrolled Type 2 Dm. BP was managed with  losartan 50 mg daily and amlodipine 10mg daily.     Ed course:   VS: Tmax 103.3 , 136/78, 18, 98%  Labs: Na 134, HCO3- 25, Lactate 3.9->3, Glu 443--405, Alb 3.7,   Imaging: CTH neg, CT abdomen negative   Received: 3L NS, Tylenol, cefepime, flagyl, metronidazole, vanc  Endo consulted for DM management.   Type 2 diabetes diagnosed in  based on blood work. In , she was started metformin 1000 mg BID. In , patient was pregnant and started on glyburide- did not require insulin for pregnancy. Check sugars 3-4 times a day. When pain is controlled, sugars are improved. AM: 180-190. On a "good day" 220's after eating. With pain, sugars can increase to the 300-500's. U500 195 U before each meals, metformin 1000 BID, ozempic weekly,  and admelog, 40 for correction when sugars are running high . Diet: Breakfast: skips, has some nausea. Lunch: crackers, salads; white rice, long-grain rice with garlic butter + soy sauce; does not eat food. Dinner: vegetables, [place] food with rice. Drinks soda: used to drink 2 liters of Coke/Pepsi but now has cut back. Has water with lemon. She reports left eye retinopathy. She follows with DR. Willingham for DM.   She reports family hx of DM in mother.   Of note, pt was admitted in 2020 and was on Lantus 65 U BID with lispro 55 U TID.   labs: PH: 7.47, lactate: Na: 134, K: 3.7, lactate: 3.9. Urine: (+) for glu, neg ketones.   s/p NS 3 liters in the ED.     SysAdmin_VisitLink  Current Meds:  dextrose 40% Gel 15 Gram(s) Oral once  dextrose 5%. 1000 milliLiter(s) IV Continuous <Continuous>  dextrose 5%. 1000 milliLiter(s) IV Continuous <Continuous>  dextrose 50% Injectable 25 Gram(s) IV Push once  dextrose 50% Injectable 12.5 Gram(s) IV Push once  dextrose 50% Injectable 25 Gram(s) IV Push once  enoxaparin Injectable 40 milliGRAM(s) SubCutaneous every 12 hours  glucagon  Injectable 1 milliGRAM(s) IntraMuscular once  insulin glargine Injectable (LANTUS) 65 Unit(s) SubCutaneous every morning  insulin glargine Injectable (LANTUS) 65 Unit(s) SubCutaneous at bedtime  insulin lispro (ADMELOG) corrective regimen sliding scale   SubCutaneous Before meals and at bedtime  insulin lispro Injectable (ADMELOG) 40 Unit(s) SubCutaneous three times a day before meals  sodium chloride 0.9% Bolus 1200 milliLiter(s) IV Bolus once      Allergies:  amoxicillin (Unknown)  Bactrim I.V. (Rash (Mod to Severe))  clindamycin (Pruritus)  iodine containing compounds (Hives; Anaphylaxis)  penicillin (Hives)  shellfish. (Hives; Anaphylaxis)      ROS:  Denies the following except as indicated.    General: weight loss/weight gain,  fatigue  Eyes: Blurry vision, double vision, visual changes  ENT: Throat pain, changes in voice,   CV: palpitations, SOB, CP, cough  GI:  difficulty swallowing, abdominal pain  : polyuria, dysuria  MSK: weakness, joint pain  Skin: rash, dryness, diaphoresis  Heme: Easy bruising,bleeding      Vital Signs Last 24 Hrs  T(C): 37.2 (2021 12:44), Max: 39.6 (2021 10:24)  T(F): 98.9 (2021 12:44), Max: 103.3 (2021 10:24)  HR: 110 (2021 12:44) (110 - 132)  BP: 136/78 (2021 10:24) (136/78 - 173/108)  BP(mean): --  RR: 18 (2021 12:44) (18 - 18)  SpO2: 96% (2021 12:44) (96% - 99%)  Height (cm): 170.2 ( @ 08:31)  Weight (kg): 140.2 ( @ 08:31)  BMI (kg/m2): 48.4 ( @ 08:31)      Constitutional:  in mild NAD.   HEENT:  no proptosis or lid retraction  Neck: no thyromegaly or palpable thyroid nodules   Respiratory: lungs CTAB.  Cardiovascular: regular rhythm, normal S1 and S2  GI: soft, NT/ND, no masses/HSM appreciated.  Neurology: no tremors, DTR 2+  Skin: no visible rashes/lesions  Psychiatric: AAO x 3, normal affect/mood.  Ext: radial pulses intact, DP pulses intact, extremities warm, no cyanosis, clubbing or edema.       LABS:                        13.5   7.32  )-----------( 294      ( 2021 09:37 )             41.9     07-04    134<L>  |  94<L>  |  9   ----------------------------<  405<H>  3.7   |  25  |  0.68    Ca    9.0      2021 10:50    TPro  8.0  /  Alb  3.7  /  TBili  0.3  /  DBili  x   /  AST  31  /  ALT  29  /  AlkPhos  154<H>  07-04    PT/INR - ( 2021 09:37 )   PT: 14.4 sec;   INR: 1.21          PTT - ( 2021 09:37 )  PTT:29.6 sec  Urinalysis Basic - ( 2021 09:38 )    Color: Yellow / Appearance: Hazy / S.015 / pH: x  Gluc: x / Ketone: NEGATIVE  / Bili: Negative / Urobili: 0.2 E.U./dL   Blood: x / Protein: NEGATIVE mg/dL / Nitrite: NEGATIVE   Leuk Esterase: Trace / RBC: 5-10 /HPF / WBC > 10 /HPF   Sq Epi: x / Non Sq Epi: 5-10 /HPF / Bacteria: Present /HPF        Thyroid Stimulating Hormone, Serum: 1.367 ( @ 06:07)      RADIOLOGY & ADDITIONAL STUDIES:  CAPILLARY BLOOD GLUCOSE      POCT Blood Glucose.: 370 mg/dL (2021 14:35)  POCT Blood Glucose.: 451 mg/dL (2021 13:17)  POCT Blood Glucose.: 396 mg/dL (2021 08:29)        A/P:  41yFemale, morbidly obese, with PMH of uncontrolled DM2, HTN, HLD, and recent admission for vulvovaginal candidiasis (treated with fluconazole, clotrimazole) and e.Coli UTI w diffuse abd pain, gradual onset diffuse headache, vomiting, elevated BP for one day.   Endo consulted for DM management.     1.  Uncontrolled type 2 DM  A1c:10.6%  wt:140, BMI: 48   Please give lantus  65     units BID.   Please give lispro  sliding scale  q 4h.   if fs-199: 3 u   200-249: 6 u  250-299: 9 u  300-350: 12 U   >350 : 15 U     Pt's fingerstick glucose goal is 100-180    Will continue to monitor     For discharge, pt can continue    Pt can follow up at discharge with Dr. willingham.    case seen and discussed with Dr. Coyle   and update primary team

## 2021-07-04 NOTE — H&P ADULT - PROBLEM SELECTOR PLAN 8
patient with history of abdominal hernia with mesh put in place   - no signs of infection on abdominal CT

## 2021-07-04 NOTE — H&P ADULT - ATTENDING COMMENTS
40 year old morbidly obese female, with PMH of uncontrolled DM2, HTN, HLD p/w abd pain and dysuria found to have sepsis likely 2/2 UTI and hyperglycemia.  Continue insulin and SSI, monitor glucose.  Continue abx and f/u culture, please verify urine culture.  Continue antihypertensive.  DVT PPx and dispo pending

## 2021-07-04 NOTE — H&P ADULT - NSHPPHYSICALEXAM_GEN_ALL_CORE
Vital Signs Last 12 Hrs  T(F): 98.5 (07-04-21 @ 15:18), Max: 103.3 (07-04-21 @ 10:24)  HR: 85 (07-04-21 @ 15:18) (85 - 132)  BP: 155/95 (07-04-21 @ 15:18) (133/62 - 173/108)  BP(mean): --  RR: 18 (07-04-21 @ 15:18) (18 - 18)  SpO2: 99% (07-04-21 @ 15:18) (95% - 99%)    PHYSICAL EXAM:  Constitutional: NAD, comfortable in bed.  HEENT: NC/AT, PERRLA, EOMI, no conjunctival pallor or scleral icterus, MMM  Neck: Supple, no JVD  Respiratory: CTA B/L. No w/r/r.   Cardiovascular: RRR, normal S1 and S2, no m/r/g.   Gastrointestinal: +BS, soft diffusely tender, worse in periumbillical and epigastric region. no guarding or rebound tenderness, no palpable masses   Back: + Cva tenderness, worse on left side.   Extremities: wwp; no cyanosis, clubbing or edema.   Vascular: Pulses equal and strong throughout.   Neurological: AAOx3, no CN deficits, strength and sensation intact throughout.   Skin: No gross skin abnormalities or rashes

## 2021-07-04 NOTE — CONSULT NOTE ADULT - ATTENDING COMMENTS
41yoF h/o poorly controlled T2DM with severe insulin resistance, obesity, HTN with previous admissions for E coli UTI, HTN emergency presents with 1 day of fever, diarrhea, abdominal pain, vomiting, headache.     T2DM history: Diagnosed in 2005. In 2009 was on glimepiride during pregnancy. During her last visit with outpt endo Dr. Joseph on 3/29/21: She was on U500 150 tid and Admelog as needed for correction. Patient reports that prior to admission she was taking U500 195 before each meal and Admelog as needed for correction (about 45 units and up). She is also metformin 1000mg bid and what sounds like a weekly GLP-1 agonist though this was not on her outpatient med list.  During a previous admission at [facility] in Nov 2020 she was on Lantus 65 bid, Lispro 55 tid.       POCT Blood Glucose.: 345 mg/dL (07-04-21 @ 15:45)  POCT Blood Glucose.: 370 mg/dL (07-04-21 @ 14:35)  POCT Blood Glucose.: 451 mg/dL (07-04-21 @ 13:17)  POCT Blood Glucose.: 396 mg/dL (07-04-21 @ 08:29)    A/P: For this obese patient with severe insulin resistance we can start a basal regimen similar to her last admission which worked reasonably well. Can start with Lantus 65 units twice daily and increase as needed. Since she's not tolerating PO right now, can use a lispro correction scale with insulin sensitivity factor 1:16 above goal  per Dr. Nascimento's note.     Laverne Coyle MD  Endocrinology Attending

## 2021-07-04 NOTE — ED PROVIDER NOTE - CLINICAL SUMMARY MEDICAL DECISION MAKING FREE TEXT BOX
Abdominal pain, headache, vaginal dc, noted febrile  Ddx: intraabd abscess vs migraine r/o ICH, UTI/vaginitis, other  - labs, CXR, CT a/p, head, empiric abx, ivf, reassess.

## 2021-07-04 NOTE — ED ADULT NURSE NOTE - OBJECTIVE STATEMENT
Pt c/o HA, abdominal pain, nausea, high BG, and "bloood pressure issues." EKG done in triage. Pt c/o fever overnight, took 500mg tylenol at 3am. Pt endorses repeated infection to abdominal surgery w/ mesh. ENdorses high BG and BP, states whenever shes in pain "both go up." Denies CP or SOB. R 20g PIV placed x2. BCx2 collected.

## 2021-07-04 NOTE — H&P ADULT - ASSESSMENT
40 year old morbidly obese female, with PMH of uncontrolled DM2, HTN, HLD, and recent admission for vulvovaginal candidiasis (treated with fluconazole, clotrimazole) and e.Coli UTI w diffuse abd pain, gradual onset diffuse headache, vomiting, and elevated BP for one day likely in setting of sepsis/uncontrolled dm

## 2021-07-04 NOTE — ED PROVIDER NOTE - CARE PLAN
Principal Discharge DX:	Severe sepsis  Secondary Diagnosis:	UTI (urinary tract infection)  Secondary Diagnosis:	Vaginal candidiasis  Secondary Diagnosis:	Headache

## 2021-07-04 NOTE — H&P ADULT - NSHPLABSRESULTS_GEN_ALL_CORE
LABS:                         13.5   7.32  )-----------( 294      ( 2021 09:37 )             41.9         134<L>  |  94<L>  |  9   ----------------------------<  405<H>  3.7   |  25  |  0.68    Ca    9.0      2021 10:50    TPro  8.0  /  Alb  3.7  /  TBili  0.3  /  DBili  x   /  AST  31  /  ALT  29  /  AlkPhos  154<H>      PT/INR - ( 2021 09:37 )   PT: 14.4 sec;   INR: 1.21          PTT - ( 2021 09:37 )  PTT:29.6 sec  Urinalysis Basic - ( 2021 09:38 )    Color: Yellow / Appearance: Hazy / S.015 / pH: x  Gluc: x / Ketone: NEGATIVE  / Bili: Negative / Urobili: 0.2 E.U./dL   Blood: x / Protein: NEGATIVE mg/dL / Nitrite: NEGATIVE   Leuk Esterase: Trace / RBC: 5-10 /HPF / WBC > 10 /HPF   Sq Epi: x / Non Sq Epi: 5-10 /HPF / Bacteria: Present /HPF            Lactate, Blood: 3.0 mmol/L ( @ 14:13)  Lactate, Blood: 3.5 mmol/L ( @ 10:50)  Lactate, Blood: 3.9 mmol/L ( @ 09:37)      RADIOLOGY, EKG & ADDITIONAL TESTS:     CTH   No acute intracranial hemorrhage or transcortical infarction.     CT abdomen:   No acute abdominal/pelvic pathology.

## 2021-07-04 NOTE — ED PROVIDER NOTE - OBJECTIVE STATEMENT
40F, morbidly obese, with PMH of uncontrolled DM2, HTN, HLD, and recent admission for vulvovaginal candidiasis (treated with fluconazole, clotrimazole) and e.Coli UTI, who presented with 2wks of worsening vaginal pain, redness, and swelling, admitted for severe sepsis 2/2 vulvovaginal cellulitis and candidiasis w/ concomitant UTI.s/p internal mesh, w abd pain, headache, vomiting, elevated BP for one day. no blurred vision, cp, sob. states feels like prior migraines. 40F, morbidly obese, with PMH of uncontrolled DM2, HTN, HLD, and recent admission for vulvovaginal candidiasis (treated with fluconazole, clotrimazole) and e.Coli UTI w diffuse abd pain, gradual onset diffuse headache, vomiting, elevated BP for one day. no blurred vision, cp, sob. states headache feels like prior migraines.

## 2021-07-04 NOTE — H&P ADULT - PROBLEM SELECTOR PLAN 1
Patient admitted for workup of sepsis of unclear etiology.   -SIRS Criteria on admission ( F 103.3) +Lactate  -Given 3L NS in ED. Lactate 3.9-->3   -WBC wnl  - Patient given Cefepime, flagyl, vanc, fluconazole in ED  - Will continue with ***  - Trend lactate Patient admitted for workup of sepsis of unclear etiology.   -SIRS Criteria on admission ( F 103.3) +Lactate  -Given 3L NS in ED. Lactate 3.9-->2.2   -WBC wnl  - Patient given Cefepime, flagyl, vanc, fluconazole in ED  - Will continue with ***  - Trend lactate in AM. Continuing maintenance fluids overnight. Patient admitted for workup of sepsis of unclear etiology.   -SIRS Criteria on admission ( F 103.3) +Lactate  -Given 3L NS in ED. Lactate 3.9-->1.9  -WBC wnl  - Patient given Cefepime, flagyl, vanc, fluconazole in ED  - Will continue with ceftriaxone 2g IV   - Trend lactate in AM. Continuing maintenance fluids overnight.

## 2021-07-04 NOTE — H&P ADULT - PROBLEM SELECTOR PLAN 6
Patient with history of hyperlipidemia Patient with history of hyperlipidemia per record   -not on any current therapy.

## 2021-07-04 NOTE — PATIENT PROFILE ADULT - VISION (WITH CORRECTIVE LENSES IF THE PATIENT USUALLY WEARS THEM):
Dominique Chen NP 8/29/2017 1:36 PM CDT    Sure she can recheck her sink. The order looks like it's already in there from [facility] if you want to check the expiration. Otherwise she should probably see rheumatology  ----- Message -----  From: Cori Hester RN  Sent: 8/29/2017 11:17 AM  To: Dominique Chen NP  Subject: FW: blood work         ----- Message -----  From: Yoana CORONADO Gauger  Sent: 8/29/2017 8:17 AM  To: IVONNE Chen [facility] Prac Nurse [facility]  Subject: blood work     Good Morning,  I am scheduled my ultrasound for next Thurs.   Should I recheck my Zinc? I see the order expires before I can get in next.  I am wondering if there is any other blood work I can do? I still am not feeling the best. It is mostly joint pains.   Would you recommend doing some different hormone bloodwork? I am feeling bloated and have gained about 4-5 unexplained pounds. It maybe from the cyst. My insurance now covers 100% because I met my out of pocket. I would really like to find out why I am not feeling well. I know we have done a lot of different tests so far. I appreciate all your help and concern.  Thanks,  Yoana    
Referral placed.   
Normal vision: sees adequately in most situations; can see medication labels, newsprint

## 2021-07-05 ENCOUNTER — TRANSCRIPTION ENCOUNTER (OUTPATIENT)
Age: 41
End: 2021-07-05

## 2021-07-05 LAB
ALBUMIN SERPL ELPH-MCNC: 3 G/DL — LOW (ref 3.3–5)
ALP SERPL-CCNC: 141 U/L — HIGH (ref 40–120)
ALT FLD-CCNC: SIGNIFICANT CHANGE UP U/L (ref 10–45)
ANION GAP SERPL CALC-SCNC: 13 MMOL/L — SIGNIFICANT CHANGE UP (ref 5–17)
ANION GAP SERPL CALC-SCNC: 9 MMOL/L — SIGNIFICANT CHANGE UP (ref 5–17)
APPEARANCE UR: CLEAR — SIGNIFICANT CHANGE UP
AST SERPL-CCNC: SIGNIFICANT CHANGE UP U/L (ref 10–40)
BACTERIA # UR AUTO: PRESENT /HPF
BILIRUB DIRECT SERPL-MCNC: <0.2 MG/DL — SIGNIFICANT CHANGE UP (ref 0–0.2)
BILIRUB INDIRECT FLD-MCNC: SIGNIFICANT CHANGE UP MG/DL (ref 0.2–1)
BILIRUB SERPL-MCNC: 0.3 MG/DL — SIGNIFICANT CHANGE UP (ref 0.2–1.2)
BILIRUB UR-MCNC: NEGATIVE — SIGNIFICANT CHANGE UP
BUN SERPL-MCNC: 6 MG/DL — LOW (ref 7–23)
BUN SERPL-MCNC: 7 MG/DL — SIGNIFICANT CHANGE UP (ref 7–23)
CALCIUM SERPL-MCNC: 8.7 MG/DL — SIGNIFICANT CHANGE UP (ref 8.4–10.5)
CALCIUM SERPL-MCNC: 8.9 MG/DL — SIGNIFICANT CHANGE UP (ref 8.4–10.5)
CHLORIDE SERPL-SCNC: 102 MMOL/L — SIGNIFICANT CHANGE UP (ref 96–108)
CHLORIDE SERPL-SCNC: 103 MMOL/L — SIGNIFICANT CHANGE UP (ref 96–108)
CO2 SERPL-SCNC: 21 MMOL/L — LOW (ref 22–31)
CO2 SERPL-SCNC: 25 MMOL/L — SIGNIFICANT CHANGE UP (ref 22–31)
COLOR SPEC: YELLOW — SIGNIFICANT CHANGE UP
COVID-19 SPIKE DOMAIN AB INTERP: NEGATIVE — SIGNIFICANT CHANGE UP
COVID-19 SPIKE DOMAIN ANTIBODY RESULT: 0.4 U/ML — SIGNIFICANT CHANGE UP
CREAT SERPL-MCNC: 0.58 MG/DL — SIGNIFICANT CHANGE UP (ref 0.5–1.3)
CREAT SERPL-MCNC: 0.64 MG/DL — SIGNIFICANT CHANGE UP (ref 0.5–1.3)
DIFF PNL FLD: ABNORMAL
EPI CELLS # UR: SIGNIFICANT CHANGE UP /HPF (ref 0–5)
GLUCOSE BLDC GLUCOMTR-MCNC: 205 MG/DL — HIGH (ref 70–99)
GLUCOSE BLDC GLUCOMTR-MCNC: 225 MG/DL — HIGH (ref 70–99)
GLUCOSE BLDC GLUCOMTR-MCNC: 230 MG/DL — HIGH (ref 70–99)
GLUCOSE BLDC GLUCOMTR-MCNC: 242 MG/DL — HIGH (ref 70–99)
GLUCOSE BLDC GLUCOMTR-MCNC: 296 MG/DL — HIGH (ref 70–99)
GLUCOSE BLDC GLUCOMTR-MCNC: 309 MG/DL — HIGH (ref 70–99)
GLUCOSE BLDC GLUCOMTR-MCNC: 311 MG/DL — HIGH (ref 70–99)
GLUCOSE SERPL-MCNC: 237 MG/DL — HIGH (ref 70–99)
GLUCOSE SERPL-MCNC: 272 MG/DL — HIGH (ref 70–99)
GLUCOSE UR QL: >=1000
HCT VFR BLD CALC: 40.1 % — SIGNIFICANT CHANGE UP (ref 34.5–45)
HGB BLD-MCNC: 12.6 G/DL — SIGNIFICANT CHANGE UP (ref 11.5–15.5)
KETONES UR-MCNC: NEGATIVE — SIGNIFICANT CHANGE UP
LACTATE SERPL-SCNC: 2.4 MMOL/L — HIGH (ref 0.5–2)
LEUKOCYTE ESTERASE UR-ACNC: NEGATIVE — SIGNIFICANT CHANGE UP
MCHC RBC-ENTMCNC: 26 PG — LOW (ref 27–34)
MCHC RBC-ENTMCNC: 31.4 GM/DL — LOW (ref 32–36)
MCV RBC AUTO: 82.9 FL — SIGNIFICANT CHANGE UP (ref 80–100)
NITRITE UR-MCNC: NEGATIVE — SIGNIFICANT CHANGE UP
NRBC # BLD: 0 /100 WBCS — SIGNIFICANT CHANGE UP (ref 0–0)
PH UR: 6 — SIGNIFICANT CHANGE UP (ref 5–8)
PLATELET # BLD AUTO: 275 K/UL — SIGNIFICANT CHANGE UP (ref 150–400)
POTASSIUM SERPL-MCNC: 3.4 MMOL/L — LOW (ref 3.5–5.3)
POTASSIUM SERPL-MCNC: SIGNIFICANT CHANGE UP MMOL/L (ref 3.5–5.3)
POTASSIUM SERPL-SCNC: 3.4 MMOL/L — LOW (ref 3.5–5.3)
POTASSIUM SERPL-SCNC: SIGNIFICANT CHANGE UP MMOL/L (ref 3.5–5.3)
PROT SERPL-MCNC: 7.5 G/DL — SIGNIFICANT CHANGE UP (ref 6–8.3)
PROT UR-MCNC: NEGATIVE MG/DL — SIGNIFICANT CHANGE UP
RBC # BLD: 4.84 M/UL — SIGNIFICANT CHANGE UP (ref 3.8–5.2)
RBC # FLD: 14.3 % — SIGNIFICANT CHANGE UP (ref 10.3–14.5)
RBC CASTS # UR COMP ASSIST: < 5 /HPF — SIGNIFICANT CHANGE UP
SARS-COV-2 IGG+IGM SERPL QL IA: 0.4 U/ML — SIGNIFICANT CHANGE UP
SARS-COV-2 IGG+IGM SERPL QL IA: NEGATIVE — SIGNIFICANT CHANGE UP
SODIUM SERPL-SCNC: 136 MMOL/L — SIGNIFICANT CHANGE UP (ref 135–145)
SODIUM SERPL-SCNC: 137 MMOL/L — SIGNIFICANT CHANGE UP (ref 135–145)
SP GR SPEC: 1.01 — SIGNIFICANT CHANGE UP (ref 1–1.03)
UROBILINOGEN FLD QL: 0.2 E.U./DL — SIGNIFICANT CHANGE UP
WBC # BLD: 5.56 K/UL — SIGNIFICANT CHANGE UP (ref 3.8–10.5)
WBC # FLD AUTO: 5.56 K/UL — SIGNIFICANT CHANGE UP (ref 3.8–10.5)
WBC UR QL: < 5 /HPF — SIGNIFICANT CHANGE UP

## 2021-07-05 PROCEDURE — 71045 X-RAY EXAM CHEST 1 VIEW: CPT | Mod: 26

## 2021-07-05 PROCEDURE — 99232 SBSQ HOSP IP/OBS MODERATE 35: CPT | Mod: GC

## 2021-07-05 PROCEDURE — 99232 SBSQ HOSP IP/OBS MODERATE 35: CPT

## 2021-07-05 RX ORDER — INSULIN GLARGINE 100 [IU]/ML
75 INJECTION, SOLUTION SUBCUTANEOUS
Refills: 0 | Status: DISCONTINUED | OUTPATIENT
Start: 2021-07-05 | End: 2021-07-09

## 2021-07-05 RX ORDER — IBUPROFEN 200 MG
400 TABLET ORAL EVERY 6 HOURS
Refills: 0 | Status: DISCONTINUED | OUTPATIENT
Start: 2021-07-05 | End: 2021-07-09

## 2021-07-05 RX ORDER — POTASSIUM CHLORIDE 20 MEQ
40 PACKET (EA) ORAL ONCE
Refills: 0 | Status: COMPLETED | OUTPATIENT
Start: 2021-07-05 | End: 2021-07-05

## 2021-07-05 RX ORDER — CEFTRIAXONE 500 MG/1
2000 INJECTION, POWDER, FOR SOLUTION INTRAMUSCULAR; INTRAVENOUS EVERY 24 HOURS
Refills: 0 | Status: DISCONTINUED | OUTPATIENT
Start: 2021-07-05 | End: 2021-07-06

## 2021-07-05 RX ORDER — ACETAMINOPHEN 500 MG
650 TABLET ORAL EVERY 6 HOURS
Refills: 0 | Status: DISCONTINUED | OUTPATIENT
Start: 2021-07-05 | End: 2021-07-05

## 2021-07-05 RX ORDER — KETOROLAC TROMETHAMINE 30 MG/ML
15 SYRINGE (ML) INJECTION ONCE
Refills: 0 | Status: DISCONTINUED | OUTPATIENT
Start: 2021-07-05 | End: 2021-07-05

## 2021-07-05 RX ORDER — OXYCODONE AND ACETAMINOPHEN 5; 325 MG/1; MG/1
1 TABLET ORAL EVERY 4 HOURS
Refills: 0 | Status: DISCONTINUED | OUTPATIENT
Start: 2021-07-05 | End: 2021-07-06

## 2021-07-05 RX ORDER — ACETAMINOPHEN 500 MG
650 TABLET ORAL EVERY 6 HOURS
Refills: 0 | Status: DISCONTINUED | OUTPATIENT
Start: 2021-07-05 | End: 2021-07-09

## 2021-07-05 RX ORDER — SODIUM CHLORIDE 9 MG/ML
1000 INJECTION INTRAMUSCULAR; INTRAVENOUS; SUBCUTANEOUS ONCE
Refills: 0 | Status: COMPLETED | OUTPATIENT
Start: 2021-07-05 | End: 2021-07-05

## 2021-07-05 RX ORDER — ONDANSETRON 8 MG/1
8 TABLET, FILM COATED ORAL ONCE
Refills: 0 | Status: DISCONTINUED | OUTPATIENT
Start: 2021-07-05 | End: 2021-07-05

## 2021-07-05 RX ORDER — ONDANSETRON 8 MG/1
8 TABLET, FILM COATED ORAL ONCE
Refills: 0 | Status: COMPLETED | OUTPATIENT
Start: 2021-07-05 | End: 2021-07-05

## 2021-07-05 RX ORDER — IBUPROFEN 200 MG
400 TABLET ORAL EVERY 6 HOURS
Refills: 0 | Status: DISCONTINUED | OUTPATIENT
Start: 2021-07-05 | End: 2021-07-05

## 2021-07-05 RX ADMIN — Medication 6: at 22:27

## 2021-07-05 RX ADMIN — ONDANSETRON 8 MILLIGRAM(S): 8 TABLET, FILM COATED ORAL at 09:26

## 2021-07-05 RX ADMIN — Medication 40 MILLIEQUIVALENT(S): at 22:46

## 2021-07-05 RX ADMIN — LOSARTAN POTASSIUM 50 MILLIGRAM(S): 100 TABLET, FILM COATED ORAL at 06:23

## 2021-07-05 RX ADMIN — Medication 650 MILLIGRAM(S): at 02:11

## 2021-07-05 RX ADMIN — Medication 40 UNIT(S): at 08:50

## 2021-07-05 RX ADMIN — SODIUM CHLORIDE 1000 MILLILITER(S): 9 INJECTION INTRAMUSCULAR; INTRAVENOUS; SUBCUTANEOUS at 10:21

## 2021-07-05 RX ADMIN — Medication 6: at 13:03

## 2021-07-05 RX ADMIN — Medication 400 MILLIGRAM(S): at 00:32

## 2021-07-05 RX ADMIN — ENOXAPARIN SODIUM 40 MILLIGRAM(S): 100 INJECTION SUBCUTANEOUS at 08:51

## 2021-07-05 RX ADMIN — Medication 9: at 18:30

## 2021-07-05 RX ADMIN — Medication 12: at 16:14

## 2021-07-05 RX ADMIN — Medication 400 MILLIGRAM(S): at 19:00

## 2021-07-05 RX ADMIN — Medication 6: at 06:22

## 2021-07-05 RX ADMIN — Medication 650 MILLIGRAM(S): at 16:13

## 2021-07-05 RX ADMIN — Medication 40 UNIT(S): at 13:04

## 2021-07-05 RX ADMIN — Medication 650 MILLIGRAM(S): at 17:30

## 2021-07-05 RX ADMIN — Medication 650 MILLIGRAM(S): at 09:26

## 2021-07-05 RX ADMIN — INSULIN GLARGINE 65 UNIT(S): 100 INJECTION, SOLUTION SUBCUTANEOUS at 07:03

## 2021-07-05 RX ADMIN — Medication 12: at 02:17

## 2021-07-05 RX ADMIN — Medication 15 MILLIGRAM(S): at 03:16

## 2021-07-05 RX ADMIN — Medication 15 MILLIGRAM(S): at 03:01

## 2021-07-05 RX ADMIN — ENOXAPARIN SODIUM 40 MILLIGRAM(S): 100 INJECTION SUBCUTANEOUS at 22:27

## 2021-07-05 RX ADMIN — Medication 10 MILLIGRAM(S): at 00:30

## 2021-07-05 RX ADMIN — Medication 800 MILLIGRAM(S): at 06:38

## 2021-07-05 RX ADMIN — AMLODIPINE BESYLATE 10 MILLIGRAM(S): 2.5 TABLET ORAL at 06:23

## 2021-07-05 RX ADMIN — INSULIN GLARGINE 75 UNIT(S): 100 INJECTION, SOLUTION SUBCUTANEOUS at 22:28

## 2021-07-05 RX ADMIN — OXYCODONE AND ACETAMINOPHEN 1 TABLET(S): 5; 325 TABLET ORAL at 16:54

## 2021-07-05 RX ADMIN — Medication 650 MILLIGRAM(S): at 00:56

## 2021-07-05 RX ADMIN — CEFTRIAXONE 100 MILLIGRAM(S): 500 INJECTION, POWDER, FOR SOLUTION INTRAMUSCULAR; INTRAVENOUS at 18:30

## 2021-07-05 NOTE — PROGRESS NOTE ADULT - SUBJECTIVE AND OBJECTIVE BOX
INTERVAL HPI/OVERNIGHT EVENTS:    Patient is a 41y old  Female who presents with a chief complaint of Nausea, Vomiting, Chills (2021 14:32)  Pt was seen and examined at the bedside.   She reports HA .   Pt had chills at 1AM and a Tmax of 103F overnight. 1 episode of NBNB vomiting.  She ate banana with toast and cereal with milk for breakfast.     FSG & Insulin received:    Yesterday:  pre-dinner fs  12   units lispro SS  bedtime fs  lantus  65 units + 18   units lispro SS  40 U Lispro at bedtime.      Today:  pre-breakfast fs  nutritional lispro 40   units+ 6   units lispro SS  lantus  65 units  pre-lunch fs  nutritional lispro 40  units+ 6  units lispro SS    Pt reports the following symptoms:  CARDIOVASCULAR:  Negative for chest pain or palpitations  RESPIRATORY:  Negative for cough, wheezing, or SOB   GASTROINTESTINAL:  Negative for  diarrhea, constipation, or abdominal pain  NEUROLOGIC:  No confusion, dizziness, lightheadedness    MEDICATIONS  (STANDING):  amLODIPine   Tablet 10 milliGRAM(s) Oral daily  cefTRIAXone   IVPB 2000 milliGRAM(s) IV Intermittent every 24 hours  dextrose 40% Gel 15 Gram(s) Oral once  dextrose 40% Gel 15 Gram(s) Oral once  dextrose 5%. 1000 milliLiter(s) (50 mL/Hr) IV Continuous <Continuous>  dextrose 5%. 1000 milliLiter(s) (100 mL/Hr) IV Continuous <Continuous>  dextrose 5%. 1000 milliLiter(s) (50 mL/Hr) IV Continuous <Continuous>  dextrose 5%. 1000 milliLiter(s) (100 mL/Hr) IV Continuous <Continuous>  dextrose 50% Injectable 25 Gram(s) IV Push once  dextrose 50% Injectable 12.5 Gram(s) IV Push once  dextrose 50% Injectable 25 Gram(s) IV Push once  dextrose 50% Injectable 25 Gram(s) IV Push once  dextrose 50% Injectable 12.5 Gram(s) IV Push once  dextrose 50% Injectable 25 Gram(s) IV Push once  enoxaparin Injectable 40 milliGRAM(s) SubCutaneous every 12 hours  glucagon  Injectable 1 milliGRAM(s) IntraMuscular once  glucagon  Injectable 1 milliGRAM(s) IntraMuscular once  insulin glargine Injectable (LANTUS) 75 Unit(s) SubCutaneous two times a day  insulin lispro (ADMELOG) corrective regimen sliding scale   SubCutaneous every 4 hours  insulin lispro Injectable (ADMELOG) 40 Unit(s) SubCutaneous three times a day before meals  losartan 50 milliGRAM(s) Oral daily    MEDICATIONS  (PRN):  acetaminophen   Tablet .. 650 milliGRAM(s) Oral every 6 hours PRN Temp greater or equal to 38C (100.4F)  diphenhydrAMINE 25 milliGRAM(s) Oral every 6 hours PRN Rash and/or Itching  ibuprofen  Tablet. 800 milliGRAM(s) Oral every 12 hours PRN Severe Pain (7 - 10)      Past medical history reviewed  Family history reviewed  Social history reviewed    PHYSICAL EXAM  Vital Signs Last 24 Hrs  T(C): 38.4 (2021 09:13), Max: 39.4 (2021 02:24)  T(F): 101.1 (2021 09:13), Max: 103 (2021 02:24)  HR: 103 (2021 09:07) (65 - 107)  BP: 137/90 (2021 09:07) (137/90 - 182/113)  BP(mean): --  RR: 18 (2021 09:07) (18 - 19)  SpO2: 99% (2021 09:07) (97% - 99%)  Constitutional:  in mild NAD.   HEENT:  no proptosis or lid retraction  Neck: no thyromegaly or palpable thyroid nodules   Respiratory: lungs CTAB.  Cardiovascular: regular rhythm, normal S1 and S2  GI: soft, NT/ND, no masses/HSM appreciated.  Neurology: no tremors, DTR 2+  Skin: no visible rashes/lesions  Psychiatric: AAO x 3, normal affect/mood.  Ext: radial pulses intact, DP pulses intact, extremities warm, no cyanosis, clubbing or edema.     LABS:                        12.6   5.56  )-----------( 275      ( 2021 09:40 )             40.1     07-    136  |  102  |  6<L>  ----------------------------<  237<H>  SEE NOTE   |  21<L>  |  0.58    Ca    8.7      2021 09:39    TPro  7.5  /  Alb  3.0<L>  /  TBili  0.3  /  DBili  <0.2  /  AST  SEE NOTE  /  ALT  SEE NOTE  /  AlkPhos  141<H>  07-05    PT/INR - ( 2021 09:37 )   PT: 14.4 sec;   INR: 1.21          PTT - ( 2021 09:37 )  PTT:29.6 sec  Urinalysis Basic - ( 2021 03:14 )    Color: Yellow / Appearance: Clear / S.015 / pH: x  Gluc: x / Ketone: NEGATIVE  / Bili: Negative / Urobili: 0.2 E.U./dL   Blood: x / Protein: NEGATIVE mg/dL / Nitrite: NEGATIVE   Leuk Esterase: NEGATIVE / RBC: < 5 /HPF / WBC < 5 /HPF   Sq Epi: x / Non Sq Epi: 0-5 /HPF / Bacteria: Present /HPF      Thyroid Stimulating Hormone, Serum: 1.367 uIU/mL ( @ 06:07)      HbA1C: 10.9 % ( @ 09:37)  14.3 % ( @ 08:35)      CAPILLARY BLOOD GLUCOSE      POCT Blood Glucose.: 242 mg/dL (2021 12:16)  POCT Blood Glucose.: 230 mg/dL (2021 08:26)  POCT Blood Glucose.: 225 mg/dL (2021 06:04)  POCT Blood Glucose.: 311 mg/dL (2021 02:08)  POCT Blood Glucose.: 460 mg/dL (2021 23:11)  POCT Blood Glucose.: 495 mg/dL (2021 23:10)  POCT Blood Glucose.: 465 mg/dL (2021 22:03)  POCT Blood Glucose.: 328 mg/dL (2021 17:51)  POCT Blood Glucose.: 345 mg/dL (2021 15:45)        A/P:  41yFemale, morbidly obese, with PMH of uncontrolled DM2, HTN, HLD, and recent admission for vulvovaginal candidiasis (treated with fluconazole, clotrimazole) and e.Coli UTI w diffuse abd pain, gradual onset diffuse headache, vomiting, elevated BP for one day.   Endo consulted for DM management.     1.  Uncontrolled type 2 DM  A1c:10.6%  wt:140, BMI: 48   Please increase lantus to  75     units BID.   please c/w Lispro 40 U TID premeals.   please change  Iv fluid with anabiotics from D5 to NS or 0.45 NS   Please give lispro  sliding scale  q 4h.   if fs-199: 3 u   200-249: 6 u  250-299: 9 u  300-350: 12 U   >350 : 15 U     Pt's fingerstick glucose goal is 100-180    Will continue to monitor     For discharge, pt can continue    Pt can follow up at discharge with Dr. willingham.    case seen and discussed with Dr. Coyle   and update primary team

## 2021-07-05 NOTE — PROGRESS NOTE ADULT - PROBLEM SELECTOR PLAN 1
Patient admitted for workup of sepsis of unclear etiology.   -SIRS Criteria on admission ( F 103.3) +Lactate  -Given 3L NS in ED. Lactate 3.9-->1.7  -WBC wnl  - Patient given Cefepime, flagyl, vanc, fluconazole in ED  -Now on IV ceftriaxone   - Will continue with ceftriaxone 2g IV   - Trend lactate in AM. Continuing maintenance fluids overnight. Patient admitted for workup of sepsis of unclear etiology.   -SIRS Criteria on admission ( F 103.3) +Lactate  -WBC wnl  - Patient given Cefepime, flagyl, vanc, fluconazole in ED  -Now on IV ceftriaxone   - Will continue with ceftriaxone 2g IV

## 2021-07-05 NOTE — PROGRESS NOTE ADULT - PROBLEM SELECTOR PLAN 9
Fluids: s/p 3L bolus. maintenance 150cc/hr  Electrolytes: Mg>2, K>4  Nutrition:  No IVF currently needed, replete lytes PRN  Prophylaxis:   Activity: AAT, OOBTC  GI: none  C: FC  Dispo: Admit to [facility] Fluids: --  Electrolytes: Mg>2, K>4  Nutrition:  No IVF currently needed, replete lytes PRN  Prophylaxis:   Activity: AAT, OOBTC  GI: none  C: FC  Dispo: Admit to [facility]

## 2021-07-05 NOTE — DISCHARGE NOTE PROVIDER - NSDCCPCAREPLAN_GEN_ALL_CORE_FT
PRINCIPAL DISCHARGE DIAGNOSIS  Diagnosis: Pyelonephritis  Assessment and Plan of Treatment: Pyelonephritis is a type of infection where one or both kidneys become infected. They can be infected by bacteria or a virus. It can cause people to feel very sick and it requires treatment. You were treated with an antibiotic and are being sent home with a course of Cefpodoxime. Please continue to take your antibiotic until you run out of it. If you start to feel extreme nausea or vomiting or develop a high fever return to the ED to be seek treatment.      SECONDARY DISCHARGE DIAGNOSES  Diagnosis: Headache  Assessment and Plan of Treatment: You were treated for a headache throughout your head. You have a history of migrains and its important that you please follow up with your Neurologist Dr. Rahman as an outpatient for further treatment of your headache.

## 2021-07-05 NOTE — DISCHARGE NOTE PROVIDER - HOSPITAL COURSE
#Discharge: do not delete    Patient is 40yo F with past medical history of uncontrolled Type 2 DM, HTN, HLD, E. Coli UTI presented with fever, chills, n/v , found to have pyelonephritis       Problem List/Main Diagnoses (system-based):     tbd     Inpatient treatment course:               New medications:             Labs to be followed outpatient:       Exam to be followed outpatient:    #Discharge: do not delete    Patient is 40yo F with past medical history of uncontrolled Type 2 DM, HTN, HLD, E. Coli UTI presented with fever, chills, n/v , found to have severe sepsis 2/2 pyelonephritis and headache      Problem List/Main Diagnoses (system-based):     Problem/Plan - 1:  ·  Problem: Severe sepsis.  Plan: Patient admitted for workup of sepsis 2/2 pyelonephritis  -SIRS Criteria on admission ( F 103.3) +Lactate  -WBC wnl  PO Cefpodoxime 10 day course 200mg BID     Problem/Plan - 2:  ·  Problem: Pyelonephritis.    PO Cefpodoxime 10 day course 200mg BID     Problem/Plan - 3:  ·  Problem: Diabetes.  Plan: Patient has long history of uncontrolled Type II DM   - Seen in endo clinic with dr. Nisha Joseph  - patient states adherence to u500 150 TID with admelog as needed for hyperglycemic episodes.   -Will f/u outpatient with endo     Problem/Plan - 4:  ·  Problem: Essential hypertension.    Patient has a history of poorly controlled hypertension.   -C/w home meds of losartan and amlodopine.      Problem/Plan - 5:  ·  Problem: Headache.  Plan: Patient reports diffuse headache onset early AM 07/4   -Improvement noted on 07/7 likely in context of resolving infection.   -no meningeal signs. Patient describes durbin as consistent with prior hx of migraines. Ddx includes migraine vs increased ICP.   - Treated with motrin 800 prn for moderate pain, oxy discontinued for severe pain.   - denies visual changes or other neurologic complaints.      #Discharge: do not delete    Patient is 42yo F with past medical history of uncontrolled Type 2 DM, HTN, HLD, E. Coli UTI presented with fever, chills, n/v , found to have severe sepsis 2/2 pyelonephritis and headache      Problem List/Main Diagnoses (system-based):     Problem/Plan - 1:  ·  Problem: Severe sepsis.  Plan: Patient admitted for workup of sepsis 2/2 pyelonephritis  -SIRS Criteria on admission ( F 103.3) +Lactate  -WBC wnl  PO Cefpodoxime 10 day course 200mg BID     Problem/Plan - 2:  ·  Problem: Pyelonephritis.    PO Cefpodoxime 10 day course 200mg BID     Problem/Plan - 3:  ·  Problem: Diabetes.  Plan: Patient has long history of uncontrolled Type II DM   - Seen in endo clinic with dr. Nisha Joseph  - patient states adherence to u500 150 TID with admelog as needed for hyperglycemic episodes.   -Will f/u outpatient with endo     Problem/Plan - 4:  ·  Problem: Essential hypertension.    Patient has a history of poorly controlled hypertension.   -C/w home meds of losartan and amlodopine.      Problem/Plan - 5:  ·  Problem: Headache.  Plan: Patient reports diffuse headache onset early AM 07/4   -Improvement noted on 07/7 likely in context of resolving infection.   -no meningeal signs. Patient describes durbin as consistent with prior hx of migraines. Ddx includes migraine vs increased ICP.   - Treated with motrin 800 prn for moderate pain, oxy discontinued for severe pain.   - denies visual changes or other neurologic complaints.      Problem/Plan - 6  On final day of admission patient developed mild white vaginal discharge  hx of vaginal candidiasis instructed to f/u with gyn outpatient.

## 2021-07-05 NOTE — DISCHARGE NOTE PROVIDER - NSDCCPTREATMENT_GEN_ALL_CORE_FT
PRINCIPAL PROCEDURE  Procedure: CT, head, without contrast  Findings and Treatment: You had a CT scan of your head that was normal and showed   No acute intracranial hemorrhage or transcortical infarction.      SECONDARY PROCEDURE  Procedure: CT abdomen  Findings and Treatment: You had a CT scan of your abdomen which was normal and showed no acute abdominal/pelvic pathology.

## 2021-07-05 NOTE — DISCHARGE NOTE PROVIDER - CARE PROVIDER_API CALL
Mariya Rahman)  EEGEpilepsy; Neurology  [address], [address] [facility] [place] [address]  Phone: [phone]  Fax: [phone]  Established Patient  Follow Up Time: 1 week

## 2021-07-05 NOTE — DISCHARGE NOTE PROVIDER - NSDCFUADDAPPT_GEN_ALL_CORE_FT
(1) Please note that office of your Neurology Provider, Dr. Mariya Rahman from phone [phone] to schedule an appointment briefly following your hospital discharge.    Appointment was facilitated by Ms. NAGI Tam, Referral Coordinator.    (2) We were unable to schedule an appointment with a Gynecology Provider as they are out of network with your insurance. Please follow up with your insurance carrier for further assistance in scheduling this appointment.    Appointment was facilitated by Ms. NAGI Tam, Referral Coordinator.

## 2021-07-05 NOTE — PROGRESS NOTE ADULT - PROBLEM SELECTOR PLAN 5
Patient reports diffuse headache onset early AM 07/4   - Will treat with motrin 800 prn   - denies visual changes or other neurologic complaints Patient reports diffuse headache onset early AM 07/4   -no meningeal signs. Patient describes durbin as consistent with prior hx of migraines. Ddx includes migraine vs increased ICP.   - Will treat with motrin 800 prn   - denies visual changes or other neurologic complaints

## 2021-07-05 NOTE — PROGRESS NOTE ADULT - SUBJECTIVE AND OBJECTIVE BOX
OVERNIGHT EVENTS: Pt had chills at 1AM and a Tmax of 103F overnight. 1 episode of NBNB vomiting. BP was high and hydralazine was given. Patient complaining of severe, diffuse headache consistent with prior migraine episodes.     SUBJECTIVE:  Patient seen and examined at bedside.    Vital Signs Last 12 Hrs  T(F): 101.1 (21 @ 09:13), Max: 103 (21 @ 02:24)  HR: 103 (21 @ 09:07) (65 - 107)  BP: 137/90 (21 @ 09:07) (137/90 - 175/97)  BP(mean): --  RR: 18 (21 @ 09:07) (18 - 19)  SpO2: 99% (21 @ 09:07) (97% - 99%)  I&O's Summary      PHYSICAL EXAM:  Constitutional: NAD, comfortable in bed.  HEENT: NC/AT, PERRLA, EOMI, no conjunctival pallor or scleral icterus, MMM  Neck: Supple, no JVD  Respiratory: CTA B/L. No w/r/r.   Cardiovascular: RRR, normal S1 and S2, no m/r/g.   Gastrointestinal: +BS, soft NTND, no guarding or rebound tenderness, no palpable masses   Extremities: wwp; no cyanosis, clubbing or edema.   Vascular: Pulses equal and strong throughout.   Neurological: AAOx3, no CN deficits, strength and sensation intact throughout.   Skin: No gross skin abnormalities or rashes        LABS:                        13.5   7.32  )-----------( 294      ( 2021 09:37 )             41.9     07-    138  |  103  |  8   ----------------------------<  330<H>  3.5   |  24  |  0.68    Ca    8.1<L>      2021 18:34    TPro  8.0  /  Alb  3.7  /  TBili  0.3  /  DBili  x   /  AST  31  /  ALT  29  /  AlkPhos  154<H>  07-04    PT/INR - ( 2021 09:37 )   PT: 14.4 sec;   INR: 1.21          PTT - ( 2021 09:37 )  PTT:29.6 sec  Urinalysis Basic - ( 2021 03:14 )    Color: Yellow / Appearance: Clear / S.015 / pH: x  Gluc: x / Ketone: NEGATIVE  / Bili: Negative / Urobili: 0.2 E.U./dL   Blood: x / Protein: NEGATIVE mg/dL / Nitrite: NEGATIVE   Leuk Esterase: NEGATIVE / RBC: < 5 /HPF / WBC < 5 /HPF   Sq Epi: x / Non Sq Epi: 0-5 /HPF / Bacteria: Present /HPF          RADIOLOGY & ADDITIONAL TESTS:    MEDICATIONS  (STANDING):  amLODIPine   Tablet 10 milliGRAM(s) Oral daily  cefTRIAXone   IVPB 2000 milliGRAM(s) IV Intermittent every 24 hours  dextrose 40% Gel 15 Gram(s) Oral once  dextrose 40% Gel 15 Gram(s) Oral once  dextrose 5%. 1000 milliLiter(s) (50 mL/Hr) IV Continuous <Continuous>  dextrose 5%. 1000 milliLiter(s) (100 mL/Hr) IV Continuous <Continuous>  dextrose 5%. 1000 milliLiter(s) (50 mL/Hr) IV Continuous <Continuous>  dextrose 5%. 1000 milliLiter(s) (100 mL/Hr) IV Continuous <Continuous>  dextrose 50% Injectable 25 Gram(s) IV Push once  dextrose 50% Injectable 12.5 Gram(s) IV Push once  dextrose 50% Injectable 25 Gram(s) IV Push once  dextrose 50% Injectable 25 Gram(s) IV Push once  dextrose 50% Injectable 12.5 Gram(s) IV Push once  dextrose 50% Injectable 25 Gram(s) IV Push once  enoxaparin Injectable 40 milliGRAM(s) SubCutaneous every 12 hours  glucagon  Injectable 1 milliGRAM(s) IntraMuscular once  glucagon  Injectable 1 milliGRAM(s) IntraMuscular once  insulin glargine Injectable (LANTUS) 65 Unit(s) SubCutaneous every morning  insulin glargine Injectable (LANTUS) 65 Unit(s) SubCutaneous at bedtime  insulin lispro (ADMELOG) corrective regimen sliding scale   SubCutaneous every 4 hours  insulin lispro Injectable (ADMELOG) 40 Unit(s) SubCutaneous three times a day before meals  losartan 50 milliGRAM(s) Oral daily    MEDICATIONS  (PRN):  acetaminophen   Tablet .. 650 milliGRAM(s) Oral every 6 hours PRN Temp greater or equal to 38C (100.4F)  diphenhydrAMINE 25 milliGRAM(s) Oral every 6 hours PRN Rash and/or Itching  ibuprofen  Tablet. 800 milliGRAM(s) Oral every 12 hours PRN Severe Pain (7 - 10)

## 2021-07-06 LAB
-  AMPICILLIN/SULBACTAM: SIGNIFICANT CHANGE UP
-  AMPICILLIN: SIGNIFICANT CHANGE UP
-  CEFAZOLIN: SIGNIFICANT CHANGE UP
-  CEFTRIAXONE: SIGNIFICANT CHANGE UP
-  CIPROFLOXACIN: SIGNIFICANT CHANGE UP
-  ERTAPENEM: SIGNIFICANT CHANGE UP
-  GENTAMICIN: SIGNIFICANT CHANGE UP
-  NITROFURANTOIN: SIGNIFICANT CHANGE UP
-  PIPERACILLIN/TAZOBACTAM: SIGNIFICANT CHANGE UP
-  TOBRAMYCIN: SIGNIFICANT CHANGE UP
-  TRIMETHOPRIM/SULFAMETHOXAZOLE: SIGNIFICANT CHANGE UP
ANION GAP SERPL CALC-SCNC: 14 MMOL/L — SIGNIFICANT CHANGE UP (ref 5–17)
ANION GAP SERPL CALC-SCNC: 21 MMOL/L — HIGH (ref 5–17)
BUN SERPL-MCNC: 6 MG/DL — LOW (ref 7–23)
BUN SERPL-MCNC: 9 MG/DL — SIGNIFICANT CHANGE UP (ref 7–23)
CALCIUM SERPL-MCNC: 8.9 MG/DL — SIGNIFICANT CHANGE UP (ref 8.4–10.5)
CALCIUM SERPL-MCNC: 9.2 MG/DL — SIGNIFICANT CHANGE UP (ref 8.4–10.5)
CHLORIDE SERPL-SCNC: 98 MMOL/L — SIGNIFICANT CHANGE UP (ref 96–108)
CHLORIDE SERPL-SCNC: 98 MMOL/L — SIGNIFICANT CHANGE UP (ref 96–108)
CO2 SERPL-SCNC: 19 MMOL/L — LOW (ref 22–31)
CO2 SERPL-SCNC: 26 MMOL/L — SIGNIFICANT CHANGE UP (ref 22–31)
CREAT SERPL-MCNC: 0.7 MG/DL — SIGNIFICANT CHANGE UP (ref 0.5–1.3)
CREAT SERPL-MCNC: 0.73 MG/DL — SIGNIFICANT CHANGE UP (ref 0.5–1.3)
CULTURE RESULTS: SIGNIFICANT CHANGE UP
CULTURE RESULTS: SIGNIFICANT CHANGE UP
GLUCOSE BLDC GLUCOMTR-MCNC: 187 MG/DL — HIGH (ref 70–99)
GLUCOSE BLDC GLUCOMTR-MCNC: 209 MG/DL — HIGH (ref 70–99)
GLUCOSE BLDC GLUCOMTR-MCNC: 210 MG/DL — HIGH (ref 70–99)
GLUCOSE BLDC GLUCOMTR-MCNC: 255 MG/DL — HIGH (ref 70–99)
GLUCOSE BLDC GLUCOMTR-MCNC: 296 MG/DL — HIGH (ref 70–99)
GLUCOSE BLDC GLUCOMTR-MCNC: 334 MG/DL — HIGH (ref 70–99)
GLUCOSE BLDC GLUCOMTR-MCNC: 336 MG/DL — HIGH (ref 70–99)
GLUCOSE BLDC GLUCOMTR-MCNC: 341 MG/DL — HIGH (ref 70–99)
GLUCOSE BLDC GLUCOMTR-MCNC: 369 MG/DL — HIGH (ref 70–99)
GLUCOSE SERPL-MCNC: 172 MG/DL — HIGH (ref 70–99)
GLUCOSE SERPL-MCNC: 330 MG/DL — HIGH (ref 70–99)
HCT VFR BLD CALC: 42 % — SIGNIFICANT CHANGE UP (ref 34.5–45)
HGB BLD-MCNC: 13.2 G/DL — SIGNIFICANT CHANGE UP (ref 11.5–15.5)
MCHC RBC-ENTMCNC: 26.4 PG — LOW (ref 27–34)
MCHC RBC-ENTMCNC: 31.4 GM/DL — LOW (ref 32–36)
MCV RBC AUTO: 84 FL — SIGNIFICANT CHANGE UP (ref 80–100)
METHOD TYPE: SIGNIFICANT CHANGE UP
NRBC # BLD: 0 /100 WBCS — SIGNIFICANT CHANGE UP (ref 0–0)
ORGANISM # SPEC MICROSCOPIC CNT: SIGNIFICANT CHANGE UP
ORGANISM # SPEC MICROSCOPIC CNT: SIGNIFICANT CHANGE UP
PLATELET # BLD AUTO: 293 K/UL — SIGNIFICANT CHANGE UP (ref 150–400)
POTASSIUM SERPL-MCNC: 3.3 MMOL/L — LOW (ref 3.5–5.3)
POTASSIUM SERPL-MCNC: 3.6 MMOL/L — SIGNIFICANT CHANGE UP (ref 3.5–5.3)
POTASSIUM SERPL-SCNC: 3.3 MMOL/L — LOW (ref 3.5–5.3)
POTASSIUM SERPL-SCNC: 3.6 MMOL/L — SIGNIFICANT CHANGE UP (ref 3.5–5.3)
RBC # BLD: 5 M/UL — SIGNIFICANT CHANGE UP (ref 3.8–5.2)
RBC # FLD: 14.3 % — SIGNIFICANT CHANGE UP (ref 10.3–14.5)
SODIUM SERPL-SCNC: 138 MMOL/L — SIGNIFICANT CHANGE UP (ref 135–145)
SODIUM SERPL-SCNC: 138 MMOL/L — SIGNIFICANT CHANGE UP (ref 135–145)
SPECIMEN SOURCE: SIGNIFICANT CHANGE UP
SPECIMEN SOURCE: SIGNIFICANT CHANGE UP
WBC # BLD: 6.15 K/UL — SIGNIFICANT CHANGE UP (ref 3.8–10.5)
WBC # FLD AUTO: 6.15 K/UL — SIGNIFICANT CHANGE UP (ref 3.8–10.5)

## 2021-07-06 PROCEDURE — 99232 SBSQ HOSP IP/OBS MODERATE 35: CPT | Mod: GC

## 2021-07-06 PROCEDURE — 99233 SBSQ HOSP IP/OBS HIGH 50: CPT | Mod: GC

## 2021-07-06 RX ORDER — POTASSIUM CHLORIDE 20 MEQ
40 PACKET (EA) ORAL ONCE
Refills: 0 | Status: COMPLETED | OUTPATIENT
Start: 2021-07-06 | End: 2021-07-06

## 2021-07-06 RX ORDER — CEFTRIAXONE 500 MG/1
2000 INJECTION, POWDER, FOR SOLUTION INTRAMUSCULAR; INTRAVENOUS EVERY 24 HOURS
Refills: 0 | Status: DISCONTINUED | OUTPATIENT
Start: 2021-07-06 | End: 2021-07-07

## 2021-07-06 RX ORDER — METOCLOPRAMIDE HCL 10 MG
5 TABLET ORAL EVERY 6 HOURS
Refills: 0 | Status: DISCONTINUED | OUTPATIENT
Start: 2021-07-06 | End: 2021-07-09

## 2021-07-06 RX ORDER — INSULIN LISPRO 100/ML
55 VIAL (ML) SUBCUTANEOUS
Refills: 0 | Status: DISCONTINUED | OUTPATIENT
Start: 2021-07-06 | End: 2021-07-07

## 2021-07-06 RX ORDER — ONDANSETRON 8 MG/1
4 TABLET, FILM COATED ORAL ONCE
Refills: 0 | Status: COMPLETED | OUTPATIENT
Start: 2021-07-06 | End: 2021-07-06

## 2021-07-06 RX ORDER — OXYCODONE HYDROCHLORIDE 5 MG/1
10 TABLET ORAL EVERY 6 HOURS
Refills: 0 | Status: DISCONTINUED | OUTPATIENT
Start: 2021-07-06 | End: 2021-07-09

## 2021-07-06 RX ORDER — INSULIN LISPRO 100/ML
55 VIAL (ML) SUBCUTANEOUS
Refills: 0 | Status: DISCONTINUED | OUTPATIENT
Start: 2021-07-06 | End: 2021-07-06

## 2021-07-06 RX ADMIN — Medication 400 MILLIGRAM(S): at 14:59

## 2021-07-06 RX ADMIN — Medication 55 UNIT(S): at 17:53

## 2021-07-06 RX ADMIN — Medication 400 MILLIGRAM(S): at 06:54

## 2021-07-06 RX ADMIN — Medication 650 MILLIGRAM(S): at 06:40

## 2021-07-06 RX ADMIN — OXYCODONE HYDROCHLORIDE 10 MILLIGRAM(S): 5 TABLET ORAL at 23:50

## 2021-07-06 RX ADMIN — Medication 12: at 02:17

## 2021-07-06 RX ADMIN — OXYCODONE AND ACETAMINOPHEN 1 TABLET(S): 5; 325 TABLET ORAL at 05:40

## 2021-07-06 RX ADMIN — CEFTRIAXONE 100 MILLIGRAM(S): 500 INJECTION, POWDER, FOR SOLUTION INTRAMUSCULAR; INTRAVENOUS at 18:45

## 2021-07-06 RX ADMIN — Medication 3: at 15:00

## 2021-07-06 RX ADMIN — OXYCODONE HYDROCHLORIDE 10 MILLIGRAM(S): 5 TABLET ORAL at 18:18

## 2021-07-06 RX ADMIN — ENOXAPARIN SODIUM 40 MILLIGRAM(S): 100 INJECTION SUBCUTANEOUS at 21:59

## 2021-07-06 RX ADMIN — OXYCODONE HYDROCHLORIDE 10 MILLIGRAM(S): 5 TABLET ORAL at 17:18

## 2021-07-06 RX ADMIN — OXYCODONE AND ACETAMINOPHEN 1 TABLET(S): 5; 325 TABLET ORAL at 17:54

## 2021-07-06 RX ADMIN — OXYCODONE HYDROCHLORIDE 10 MILLIGRAM(S): 5 TABLET ORAL at 23:22

## 2021-07-06 RX ADMIN — Medication 15: at 09:01

## 2021-07-06 RX ADMIN — Medication 650 MILLIGRAM(S): at 19:22

## 2021-07-06 RX ADMIN — INSULIN GLARGINE 75 UNIT(S): 100 INJECTION, SOLUTION SUBCUTANEOUS at 07:48

## 2021-07-06 RX ADMIN — Medication 400 MILLIGRAM(S): at 15:59

## 2021-07-06 RX ADMIN — INSULIN GLARGINE 75 UNIT(S): 100 INJECTION, SOLUTION SUBCUTANEOUS at 21:59

## 2021-07-06 RX ADMIN — LOSARTAN POTASSIUM 50 MILLIGRAM(S): 100 TABLET, FILM COATED ORAL at 06:19

## 2021-07-06 RX ADMIN — Medication 12: at 07:10

## 2021-07-06 RX ADMIN — OXYCODONE AND ACETAMINOPHEN 1 TABLET(S): 5; 325 TABLET ORAL at 06:00

## 2021-07-06 RX ADMIN — Medication 5 MILLIGRAM(S): at 18:47

## 2021-07-06 RX ADMIN — Medication 40 UNIT(S): at 11:41

## 2021-07-06 RX ADMIN — Medication 40 MILLIEQUIVALENT(S): at 23:22

## 2021-07-06 RX ADMIN — Medication 650 MILLIGRAM(S): at 06:19

## 2021-07-06 RX ADMIN — Medication 400 MILLIGRAM(S): at 07:30

## 2021-07-06 RX ADMIN — Medication 40 UNIT(S): at 09:01

## 2021-07-06 RX ADMIN — AMLODIPINE BESYLATE 10 MILLIGRAM(S): 2.5 TABLET ORAL at 06:19

## 2021-07-06 RX ADMIN — Medication 40 MILLIEQUIVALENT(S): at 14:59

## 2021-07-06 RX ADMIN — Medication 400 MILLIGRAM(S): at 18:00

## 2021-07-06 RX ADMIN — ONDANSETRON 4 MILLIGRAM(S): 8 TABLET, FILM COATED ORAL at 06:54

## 2021-07-06 RX ADMIN — ENOXAPARIN SODIUM 40 MILLIGRAM(S): 100 INJECTION SUBCUTANEOUS at 09:02

## 2021-07-06 NOTE — PROGRESS NOTE ADULT - PROBLEM SELECTOR PLAN 5
Patient reports diffuse headache onset early AM 07/4   -no meningeal signs. Patient describes durbin as consistent with prior hx of migraines. Ddx includes migraine vs increased ICP.   - Will treat with motrin 800 prn   - denies visual changes or other neurologic complaints

## 2021-07-06 NOTE — PROGRESS NOTE ADULT - SUBJECTIVE AND OBJECTIVE BOX
OVERNIGHT EVENTS: Patient spiked a fever at 101F. Denied n/v/d or any new onset abdominal pain. Headache remained constant with no alleviation from percocet or Motrin.     SUBJECTIVE:  Patient seen and examined at bedside. Doing much better this AM. Patient painting toenails on side of bed with less photophobia reported.     Vital Signs Last 12 Hrs  T(F): 98.7 (21 @ 09:26), Max: 101.4 (21 @ 06:17)  HR: 96 (21 @ 09:26) (95 - 96)  BP: 150/90 (21 @ 09:26) (150/90 - 162/90)  BP(mean): --  RR: 18 (21 @ 09:26) (17 - 18)  SpO2: 98% (21 @ 09:26) (98% - 99%)  I&O's Summary      PHYSICAL EXAM:  Constitutional: NAD, comfortable in bed.  HEENT: NC/AT, PERRLA, EOMI, no conjunctival pallor or scleral icterus, MMM  Neck: Supple, no JVD  Respiratory: CTA B/L. No w/r/r.   Cardiovascular: RRR, normal S1 and S2, no m/r/g.   Gastrointestinal: +BS, soft NTND, no guarding or rebound tenderness, no palpable masses   Extremities: wwp; no cyanosis, clubbing or edema.   Vascular: Pulses equal and strong throughout.   Neurological: AAOx3, no CN deficits, strength and sensation intact throughout.   Skin: No gross skin abnormalities or rashes          LABS:                        12.6   5.56  )-----------( 275      ( 2021 09:40 )             40.1         137  |  103  |  7   ----------------------------<  272<H>  3.4<L>   |  25  |  0.64    Ca    8.9      2021 20:14    TPro  7.5  /  Alb  3.0<L>  /  TBili  0.3  /  DBili  <0.2  /  AST  SEE NOTE  /  ALT  SEE NOTE  /  AlkPhos  141<H>        Urinalysis Basic - ( 2021 03:14 )    Color: Yellow / Appearance: Clear / S.015 / pH: x  Gluc: x / Ketone: NEGATIVE  / Bili: Negative / Urobili: 0.2 E.U./dL   Blood: x / Protein: NEGATIVE mg/dL / Nitrite: NEGATIVE   Leuk Esterase: NEGATIVE / RBC: < 5 /HPF / WBC < 5 /HPF   Sq Epi: x / Non Sq Epi: 0-5 /HPF / Bacteria: Present /HPF          RADIOLOGY & ADDITIONAL TESTS:    MEDICATIONS  (STANDING):  amLODIPine   Tablet 10 milliGRAM(s) Oral daily  cefTRIAXone   IVPB 2000 milliGRAM(s) IV Intermittent every 24 hours  dextrose 40% Gel 15 Gram(s) Oral once  dextrose 40% Gel 15 Gram(s) Oral once  dextrose 5%. 1000 milliLiter(s) (50 mL/Hr) IV Continuous <Continuous>  dextrose 5%. 1000 milliLiter(s) (100 mL/Hr) IV Continuous <Continuous>  dextrose 5%. 1000 milliLiter(s) (50 mL/Hr) IV Continuous <Continuous>  dextrose 5%. 1000 milliLiter(s) (100 mL/Hr) IV Continuous <Continuous>  dextrose 50% Injectable 25 Gram(s) IV Push once  dextrose 50% Injectable 12.5 Gram(s) IV Push once  dextrose 50% Injectable 25 Gram(s) IV Push once  dextrose 50% Injectable 25 Gram(s) IV Push once  dextrose 50% Injectable 12.5 Gram(s) IV Push once  dextrose 50% Injectable 25 Gram(s) IV Push once  enoxaparin Injectable 40 milliGRAM(s) SubCutaneous every 12 hours  glucagon  Injectable 1 milliGRAM(s) IntraMuscular once  glucagon  Injectable 1 milliGRAM(s) IntraMuscular once  insulin glargine Injectable (LANTUS) 75 Unit(s) SubCutaneous two times a day  insulin lispro (ADMELOG) corrective regimen sliding scale   SubCutaneous every 4 hours  insulin lispro Injectable (ADMELOG) 40 Unit(s) SubCutaneous three times a day before meals  losartan 50 milliGRAM(s) Oral daily    MEDICATIONS  (PRN):  acetaminophen   Tablet .. 650 milliGRAM(s) Oral every 6 hours PRN Temp greater or equal to 38C (100.4F)  ibuprofen  Tablet. 400 milliGRAM(s) Oral every 6 hours PRN Moderate Pain (4 - 6)  oxyCODONE    IR 10 milliGRAM(s) Oral every 6 hours PRN Severe Pain (7 - 10)

## 2021-07-06 NOTE — PROGRESS NOTE ADULT - PROBLEM SELECTOR PLAN 3
Patient has long history of uncontrolled Type II DM   - Seen in endo clinic with dr. Nisha Joseph  - patient states adherence to u500 150 TID with admelog as needed for hyperglycemic episodes.   -endo consulted who recommended   lantus  75 units BID.   lispro  sliding scale  q 4h.   if fs-199: 3 u   200-249: 6 u  250-299: 9 u  300-350: 12 U   >350 : 15 U Patient has long history of uncontrolled Type II DM   - Seen in endo clinic with dr. Nisha Joseph  - patient states adherence to u500 150 TID with admelog as needed for hyperglycemic episodes.   -endo following

## 2021-07-06 NOTE — PROGRESS NOTE ADULT - PROBLEM SELECTOR PLAN 1
Patient admitted for workup of sepsis of unclear etiology.   -SIRS Criteria on admission ( F 103.3) +Lactate  -WBC wnl  - Patient given Cefepime, flagyl, vanc, fluconazole in ED  -Now on IV ceftriaxone only.   - Will continue with ceftriaxone 2g IV ·  Problem: Severe sepsis.  Plan: Patient admitted for workup of sepsis 2/2 pyelonephritis  -SIRS Criteria on admission ( F 103.3) +Lactate  -WBC wnl  -IV Ceftriaxone 2g

## 2021-07-06 NOTE — PROGRESS NOTE ADULT - PROBLEM SELECTOR PLAN 9
Fluids: --  Electrolytes: Mg>2, K>4  Nutrition:  No IVF currently needed, replete lytes PRN  Prophylaxis:   Activity: AAT, OOBTC  GI: none  C: FC  Dispo: Admit to [facility]

## 2021-07-06 NOTE — PROGRESS NOTE ADULT - SUBJECTIVE AND OBJECTIVE BOX
INTERVAL HPI/OVERNIGHT EVENTS:    Patient is a 41y old  Female who presents with a chief complaint of Nausea, Vomiting, Chills (2021 15:44)    FSG & Insulin received:    Yesterday:  pre-dinner fsg:  nutritional lispro   units +   units lispro SS  bedtime fsg:  lantus   units +    units lispro SS    Today:  pre-breakfast fsg:  nutritional lispro   units+    units lispro SS  pre-lunch fsg:  nutritional lispro   units+   units lispro SS    Pt reports the following symptoms:    CONSTITUTIONAL:  Negative fever or chills, feels well, good appetite  EYES:  Negative  blurry vision or double vision  CARDIOVASCULAR:  Negative for chest pain or palpitations  RESPIRATORY:  Negative for cough, wheezing, or SOB   GASTROINTESTINAL:  Negative for nausea, vomiting, diarrhea, constipation, or abdominal pain  GENITOURINARY:  Negative frequency, urgency or dysuria  NEUROLOGIC:  No headache, confusion, dizziness, lightheadedness    MEDICATIONS  (STANDING):  amLODIPine   Tablet 10 milliGRAM(s) Oral daily  cefTRIAXone   IVPB 2000 milliGRAM(s) IV Intermittent every 24 hours  dextrose 40% Gel 15 Gram(s) Oral once  dextrose 40% Gel 15 Gram(s) Oral once  dextrose 5%. 1000 milliLiter(s) (50 mL/Hr) IV Continuous <Continuous>  dextrose 5%. 1000 milliLiter(s) (100 mL/Hr) IV Continuous <Continuous>  dextrose 5%. 1000 milliLiter(s) (50 mL/Hr) IV Continuous <Continuous>  dextrose 5%. 1000 milliLiter(s) (100 mL/Hr) IV Continuous <Continuous>  dextrose 50% Injectable 25 Gram(s) IV Push once  dextrose 50% Injectable 12.5 Gram(s) IV Push once  dextrose 50% Injectable 25 Gram(s) IV Push once  dextrose 50% Injectable 25 Gram(s) IV Push once  dextrose 50% Injectable 12.5 Gram(s) IV Push once  dextrose 50% Injectable 25 Gram(s) IV Push once  enoxaparin Injectable 40 milliGRAM(s) SubCutaneous every 12 hours  glucagon  Injectable 1 milliGRAM(s) IntraMuscular once  glucagon  Injectable 1 milliGRAM(s) IntraMuscular once  insulin glargine Injectable (LANTUS) 75 Unit(s) SubCutaneous two times a day  insulin lispro (ADMELOG) corrective regimen sliding scale   SubCutaneous every 4 hours  insulin lispro Injectable (ADMELOG) 40 Unit(s) SubCutaneous three times a day before meals  losartan 50 milliGRAM(s) Oral daily    MEDICATIONS  (PRN):  acetaminophen   Tablet .. 650 milliGRAM(s) Oral every 6 hours PRN Temp greater or equal to 38C (100.4F)  ibuprofen  Tablet. 400 milliGRAM(s) Oral every 6 hours PRN Moderate Pain (4 - 6)  oxyCODONE    IR 10 milliGRAM(s) Oral every 6 hours PRN Severe Pain (7 - 10)      PHYSICAL EXAM  Vital Signs Last 24 Hrs  T(C): 37.1 (2021 09:26), Max: 38.6 (2021 06:17)  T(F): 98.7 (2021 09:), Max: 101.4 (2021 06:17)  HR: 96 (:) (95 - 105)  BP: 150/90 (2021 09:26) (143/71 - 162/90)  BP(mean): --  RR: 18 (2021 09:) (17 - 18)  SpO2: 98% (2021 09:26) (98% - 100%)    Constitutional: wn/wd in NAD.   HEENT: NCAT, MMM, OP clear, EOMI, , no proptosis or lid retraction  Neck: no thyromegaly or palpable thyroid nodules   Respiratory: lungs CTAB.  Cardiovascular: regular rhythm, normal S1 and S2, no audible murmurs, no peripheral edema  GI: soft, NT/ND, no masses/HSM appreciated.  Neurology: no tremors, DTR 2+  Skin: no visible rashes/lesions  Psychiatric: AAO x 3, normal affect/mood.    LABS:                        12.6   5.56  )-----------( 275      ( 2021 09:40 )             40.1     07-05    137  |  103  |  7   ----------------------------<  272<H>  3.4<L>   |  25  |  0.64    Ca    8.9      2021 20:14    TPro  7.5  /  Alb  3.0<L>  /  TBili  0.3  /  DBili  <0.2  /  AST  SEE NOTE  /  ALT  SEE NOTE  /  AlkPhos  141<H>  -      Urinalysis Basic - ( 2021 03:14 )    Color: Yellow / Appearance: Clear / S.015 / pH: x  Gluc: x / Ketone: NEGATIVE  / Bili: Negative / Urobili: 0.2 E.U./dL   Blood: x / Protein: NEGATIVE mg/dL / Nitrite: NEGATIVE   Leuk Esterase: NEGATIVE / RBC: < 5 /HPF / WBC < 5 /HPF   Sq Epi: x / Non Sq Epi: 0-5 /HPF / Bacteria: Present /HPF      Thyroid Stimulating Hormone, Serum: 1.367 uIU/mL ( @ 06:07)      HbA1C:         Insulin Sliding Scale requirements X 24 Hours:      RADIOLOGY & ADDITIONAL TESTS:      A/P: 41y Female with history of DM type II presenting for       1.  DM -     Please continue           units lantus at bedtime  / in the morning and        units lispro with meals and lispro moderate / low dose sliding scale 4 times daily with meals and at bedtime.  Please continue consistent carbohydrate diet.      Goal FSG is   Will continue to monitor   For discharge, pt can continue    Pt can follow up at discharge with [facility] by calling  to make an appointment.   Will discuss case with     and update primary team INTERVAL HPI/OVERNIGHT EVENTS:    Patient is a 41y old  Female who presents with a chief complaint of Nausea, Vomiting, Chills (2021 15:44)    Patient reports poor appetite due to nausea. Was only able to tolerate breakfast after zofran. She feels elevated sugars are due to inadequate relief of abdominal pain and migraine.     FSG & Insulin received:    Yesterday:  pre-dinner fs  nutritional lispro 0  units + 12  units lispro SS  bedtime fs  lantus 75  units + 6   units lispro SS    2AM: 336  12 units lispro SS    7AM: 341 lantus 75  units + 12   units lispro SS  Today:  pre-breakfast fs  nutritional lispro 40  units+ 115   units lispro SS  pre-lunch fs  nutritional lispro  40  units+ 0  units lispro SS    Pt reports the following symptoms:    CONSTITUTIONAL:  Negative fever or chills, feels well, good appetite  EYES:  Negative  blurry vision or double vision  CARDIOVASCULAR:  Negative for chest pain or palpitations  RESPIRATORY:  Negative for cough, wheezing, or SOB   GASTROINTESTINAL:  Negative for nausea, vomiting, diarrhea, constipation, or abdominal pain  GENITOURINARY:  Negative frequency, urgency or dysuria  NEUROLOGIC:  No headache, confusion, dizziness, lightheadedness    MEDICATIONS  (STANDING):  amLODIPine   Tablet 10 milliGRAM(s) Oral daily  cefTRIAXone   IVPB 2000 milliGRAM(s) IV Intermittent every 24 hours  dextrose 40% Gel 15 Gram(s) Oral once  dextrose 40% Gel 15 Gram(s) Oral once  dextrose 5%. 1000 milliLiter(s) (50 mL/Hr) IV Continuous <Continuous>  dextrose 5%. 1000 milliLiter(s) (100 mL/Hr) IV Continuous <Continuous>  dextrose 5%. 1000 milliLiter(s) (50 mL/Hr) IV Continuous <Continuous>  dextrose 5%. 1000 milliLiter(s) (100 mL/Hr) IV Continuous <Continuous>  dextrose 50% Injectable 25 Gram(s) IV Push once  dextrose 50% Injectable 12.5 Gram(s) IV Push once  dextrose 50% Injectable 25 Gram(s) IV Push once  dextrose 50% Injectable 25 Gram(s) IV Push once  dextrose 50% Injectable 12.5 Gram(s) IV Push once  dextrose 50% Injectable 25 Gram(s) IV Push once  enoxaparin Injectable 40 milliGRAM(s) SubCutaneous every 12 hours  glucagon  Injectable 1 milliGRAM(s) IntraMuscular once  glucagon  Injectable 1 milliGRAM(s) IntraMuscular once  insulin glargine Injectable (LANTUS) 75 Unit(s) SubCutaneous two times a day  insulin lispro (ADMELOG) corrective regimen sliding scale   SubCutaneous every 4 hours  insulin lispro Injectable (ADMELOG) 40 Unit(s) SubCutaneous three times a day before meals  losartan 50 milliGRAM(s) Oral daily    MEDICATIONS  (PRN):  acetaminophen   Tablet .. 650 milliGRAM(s) Oral every 6 hours PRN Temp greater or equal to 38C (100.4F)  ibuprofen  Tablet. 400 milliGRAM(s) Oral every 6 hours PRN Moderate Pain (4 - 6)  oxyCODONE    IR 10 milliGRAM(s) Oral every 6 hours PRN Severe Pain (7 - 10)      PHYSICAL EXAM  Vital Signs Last 24 Hrs  T(C): 37.1 (2021 09:26), Max: 38.6 (2021 06:17)  T(F): 98.7 (2021 09:26), Max: 101.4 (2021 06:17)  HR: 96 (2021 09:26) (95 - 105)  BP: 150/90 (2021 09:26) (143/71 - 162/90)  BP(mean): --  RR: 18 (2021 09:26) (17 - 18)  SpO2: 98% (2021 09:26) (98% - 100%)    Constitutional: wn/wd in NAD.   HEENT: NCAT, MMM, OP clear, EOMI, , no proptosis or lid retraction  Neck: no thyromegaly or palpable thyroid nodules   Respiratory: lungs CTAB.  Cardiovascular: regular rhythm, normal S1 and S2, no audible murmurs, no peripheral edema  GI: soft, NT/ND, no masses/HSM appreciated.  Neurology: no tremors, DTR 2+  Skin: no visible rashes/lesions  Psychiatric: AAO x 3, normal affect/mood.    LABS:                        12.6   5.56  )-----------( 275      ( 2021 09:40 )             40.1     07-05    137  |  103  |  7   ----------------------------<  272<H>  3.4<L>   |  25  |  0.64    Ca    8.9      2021 20:14    TPro  7.5  /  Alb  3.0<L>  /  TBili  0.3  /  DBili  <0.2  /  AST  SEE NOTE  /  ALT  SEE NOTE  /  AlkPhos  141<H>  07-05      Urinalysis Basic - ( 2021 03:14 )    Color: Yellow / Appearance: Clear / S.015 / pH: x  Gluc: x / Ketone: NEGATIVE  / Bili: Negative / Urobili: 0.2 E.U./dL   Blood: x / Protein: NEGATIVE mg/dL / Nitrite: NEGATIVE   Leuk Esterase: NEGATIVE / RBC: < 5 /HPF / WBC < 5 /HPF   Sq Epi: x / Non Sq Epi: 0-5 /HPF / Bacteria: Present /HPF      Thyroid Stimulating Hormone, Serum: 1.367 uIU/mL ( @ 06:07)      HbA1C: 10.6        Insulin Sliding Scale requirements X 24 Hours:      RADIOLOGY & ADDITIONAL TESTS:      A/P:  41yFemale, morbidly obese, with PMH of uncontrolled DM2, HTN, HLD, and recent admission for vulvovaginal candidiasis (treated with fluconazole, clotrimazole) and e.Coli UTI w diffuse abd pain, gradual onset diffuse headache, vomiting, elevated BP for one day.   Endo consulted for DM management.     1.  Uncontrolled type 2 DM  A1c:10.6%  wt:140, BMI: 48   Please keep lantus 75  units BID.   please increase Lispro 55 U TID premeals.   please change  Iv fluid with antibiotics from D5 to NS or 0.45 NS   Please give lispro sliding scale  q 4h.   if fs-199: 3 u   200-249: 6 u  250-299: 9 u  300-350: 12 U   >350 : 15 U    Goal FSG is 100-180  Will continue to monitor   For discharge, pt can continue    Pt can follow up at discharge with [facility] by calling  to make an appointment.   Will discuss case with Dr. Bowden  and update primary team

## 2021-07-06 NOTE — PROGRESS NOTE ADULT - PROBLEM SELECTOR PLAN 2
-RTP u/s + CVA tenderness on exam  -F/u urine culture sensivities -Urine culture + for E. Coli w/ sensitivities resulted.   IV Ceftriaxone 2g  -CT abd wnl.

## 2021-07-06 NOTE — PROGRESS NOTE ADULT - ASSESSMENT
40 year old morbidly obese female, with PMH of uncontrolled DM2, HTN, HLD, and recent admission for vulvovaginal candidiasis (treated with fluconazole, clotrimazole) and e.Coli UTI w diffuse abd pain, gradual onset diffuse headache, vomiting, and elevated BP for one day likely in setting of sepsis/uncontrolled dm 40 year old morbidly obese female, with PMH of uncontrolled DM2, HTN, HLD, and recent admission for vulvovaginal candidiasis (treated with fluconazole, clotrimazole) and e.Coli UTI w diffuse abd pain, gradual onset diffuse headache, vomiting, and elevated BP for one day likely in setting of sepsis 2/2 pyelonephritis.  40 year old morbidly obese female, with PMH of uncontrolled DM2, HTN, HLD, and recent admission for vulvovaginal candidiasis (treated with fluconazole, clotrimazole) and e.Coli UTI w diffuse abd pain, gradual onset diffuse headache, vomiting, and elevated BP for one day likely in setting of sepsis 2/2 pyelonephritis with poorly controlled DM

## 2021-07-07 DIAGNOSIS — N12 TUBULO-INTERSTITIAL NEPHRITIS, NOT SPECIFIED AS ACUTE OR CHRONIC: ICD-10-CM

## 2021-07-07 LAB
ANION GAP SERPL CALC-SCNC: 9 MMOL/L — SIGNIFICANT CHANGE UP (ref 5–17)
BUN SERPL-MCNC: 9 MG/DL — SIGNIFICANT CHANGE UP (ref 7–23)
CALCIUM SERPL-MCNC: 8.9 MG/DL — SIGNIFICANT CHANGE UP (ref 8.4–10.5)
CHLORIDE SERPL-SCNC: 100 MMOL/L — SIGNIFICANT CHANGE UP (ref 96–108)
CO2 SERPL-SCNC: 26 MMOL/L — SIGNIFICANT CHANGE UP (ref 22–31)
CREAT SERPL-MCNC: 0.75 MG/DL — SIGNIFICANT CHANGE UP (ref 0.5–1.3)
GLUCOSE BLDC GLUCOMTR-MCNC: 188 MG/DL — HIGH (ref 70–99)
GLUCOSE BLDC GLUCOMTR-MCNC: 204 MG/DL — HIGH (ref 70–99)
GLUCOSE BLDC GLUCOMTR-MCNC: 229 MG/DL — HIGH (ref 70–99)
GLUCOSE BLDC GLUCOMTR-MCNC: 254 MG/DL — HIGH (ref 70–99)
GLUCOSE BLDC GLUCOMTR-MCNC: 304 MG/DL — HIGH (ref 70–99)
GLUCOSE SERPL-MCNC: 348 MG/DL — HIGH (ref 70–99)
HCT VFR BLD CALC: 40.2 % — SIGNIFICANT CHANGE UP (ref 34.5–45)
HGB BLD-MCNC: 12.6 G/DL — SIGNIFICANT CHANGE UP (ref 11.5–15.5)
MCHC RBC-ENTMCNC: 25.9 PG — LOW (ref 27–34)
MCHC RBC-ENTMCNC: 31.3 GM/DL — LOW (ref 32–36)
MCV RBC AUTO: 82.7 FL — SIGNIFICANT CHANGE UP (ref 80–100)
NRBC # BLD: 0 /100 WBCS — SIGNIFICANT CHANGE UP (ref 0–0)
PLATELET # BLD AUTO: 271 K/UL — SIGNIFICANT CHANGE UP (ref 150–400)
POTASSIUM SERPL-MCNC: 4 MMOL/L — SIGNIFICANT CHANGE UP (ref 3.5–5.3)
POTASSIUM SERPL-SCNC: 4 MMOL/L — SIGNIFICANT CHANGE UP (ref 3.5–5.3)
RBC # BLD: 4.86 M/UL — SIGNIFICANT CHANGE UP (ref 3.8–5.2)
RBC # FLD: 14.6 % — HIGH (ref 10.3–14.5)
SODIUM SERPL-SCNC: 135 MMOL/L — SIGNIFICANT CHANGE UP (ref 135–145)
WBC # BLD: 6.11 K/UL — SIGNIFICANT CHANGE UP (ref 3.8–10.5)
WBC # FLD AUTO: 6.11 K/UL — SIGNIFICANT CHANGE UP (ref 3.8–10.5)

## 2021-07-07 PROCEDURE — 99232 SBSQ HOSP IP/OBS MODERATE 35: CPT | Mod: GC

## 2021-07-07 PROCEDURE — 99233 SBSQ HOSP IP/OBS HIGH 50: CPT | Mod: GC

## 2021-07-07 RX ORDER — GLUCAGON INJECTION, SOLUTION 0.5 MG/.1ML
1 INJECTION, SOLUTION SUBCUTANEOUS ONCE
Refills: 0 | Status: DISCONTINUED | OUTPATIENT
Start: 2021-07-07 | End: 2021-07-09

## 2021-07-07 RX ORDER — CIPROFLOXACIN LACTATE 400MG/40ML
500 VIAL (ML) INTRAVENOUS EVERY 12 HOURS
Refills: 0 | Status: DISCONTINUED | OUTPATIENT
Start: 2021-07-07 | End: 2021-07-07

## 2021-07-07 RX ORDER — CEFPODOXIME PROXETIL 100 MG
200 TABLET ORAL EVERY 12 HOURS
Refills: 0 | Status: DISCONTINUED | OUTPATIENT
Start: 2021-07-07 | End: 2021-07-09

## 2021-07-07 RX ORDER — CEFPODOXIME PROXETIL 100 MG
1 TABLET ORAL
Qty: 0 | Refills: 0 | DISCHARGE
Start: 2021-07-07

## 2021-07-07 RX ORDER — INSULIN GLARGINE 100 [IU]/ML
30 INJECTION, SOLUTION SUBCUTANEOUS ONCE
Refills: 0 | Status: COMPLETED | OUTPATIENT
Start: 2021-07-07 | End: 2021-07-07

## 2021-07-07 RX ORDER — INSULIN LISPRO 100/ML
VIAL (ML) SUBCUTANEOUS
Refills: 0 | Status: DISCONTINUED | OUTPATIENT
Start: 2021-07-07 | End: 2021-07-09

## 2021-07-07 RX ORDER — INSULIN GLARGINE 100 [IU]/ML
30 INJECTION, SOLUTION SUBCUTANEOUS ONCE
Refills: 0 | Status: COMPLETED | OUTPATIENT
Start: 2021-07-08 | End: 2021-07-08

## 2021-07-07 RX ORDER — INSULIN LISPRO 100/ML
65 VIAL (ML) SUBCUTANEOUS
Refills: 0 | Status: DISCONTINUED | OUTPATIENT
Start: 2021-07-07 | End: 2021-07-09

## 2021-07-07 RX ORDER — SODIUM CHLORIDE 9 MG/ML
1000 INJECTION, SOLUTION INTRAVENOUS
Refills: 0 | Status: DISCONTINUED | OUTPATIENT
Start: 2021-07-07 | End: 2021-07-09

## 2021-07-07 RX ADMIN — LOSARTAN POTASSIUM 50 MILLIGRAM(S): 100 TABLET, FILM COATED ORAL at 06:25

## 2021-07-07 RX ADMIN — ENOXAPARIN SODIUM 40 MILLIGRAM(S): 100 INJECTION SUBCUTANEOUS at 22:23

## 2021-07-07 RX ADMIN — Medication 5 MILLIGRAM(S): at 21:04

## 2021-07-07 RX ADMIN — OXYCODONE HYDROCHLORIDE 10 MILLIGRAM(S): 5 TABLET ORAL at 21:40

## 2021-07-07 RX ADMIN — Medication 200 MILLIGRAM(S): at 17:29

## 2021-07-07 RX ADMIN — Medication 65 UNIT(S): at 12:17

## 2021-07-07 RX ADMIN — Medication 55 UNIT(S): at 08:54

## 2021-07-07 RX ADMIN — INSULIN GLARGINE 75 UNIT(S): 100 INJECTION, SOLUTION SUBCUTANEOUS at 09:12

## 2021-07-07 RX ADMIN — OXYCODONE HYDROCHLORIDE 10 MILLIGRAM(S): 5 TABLET ORAL at 14:12

## 2021-07-07 RX ADMIN — Medication 5 MILLIGRAM(S): at 08:01

## 2021-07-07 RX ADMIN — INSULIN GLARGINE 30 UNIT(S): 100 INJECTION, SOLUTION SUBCUTANEOUS at 14:50

## 2021-07-07 RX ADMIN — Medication 65 UNIT(S): at 17:29

## 2021-07-07 RX ADMIN — Medication 3: at 17:29

## 2021-07-07 RX ADMIN — Medication 400 MILLIGRAM(S): at 00:00

## 2021-07-07 RX ADMIN — OXYCODONE HYDROCHLORIDE 10 MILLIGRAM(S): 5 TABLET ORAL at 21:04

## 2021-07-07 RX ADMIN — ENOXAPARIN SODIUM 40 MILLIGRAM(S): 100 INJECTION SUBCUTANEOUS at 08:55

## 2021-07-07 RX ADMIN — Medication 400 MILLIGRAM(S): at 02:56

## 2021-07-07 RX ADMIN — Medication 5 MILLIGRAM(S): at 14:13

## 2021-07-07 RX ADMIN — INSULIN GLARGINE 75 UNIT(S): 100 INJECTION, SOLUTION SUBCUTANEOUS at 22:22

## 2021-07-07 RX ADMIN — OXYCODONE HYDROCHLORIDE 10 MILLIGRAM(S): 5 TABLET ORAL at 08:01

## 2021-07-07 RX ADMIN — Medication 6: at 12:17

## 2021-07-07 RX ADMIN — OXYCODONE HYDROCHLORIDE 10 MILLIGRAM(S): 5 TABLET ORAL at 14:58

## 2021-07-07 RX ADMIN — AMLODIPINE BESYLATE 10 MILLIGRAM(S): 2.5 TABLET ORAL at 06:25

## 2021-07-07 RX ADMIN — OXYCODONE HYDROCHLORIDE 10 MILLIGRAM(S): 5 TABLET ORAL at 09:29

## 2021-07-07 RX ADMIN — Medication 6: at 22:22

## 2021-07-07 NOTE — PROGRESS NOTE ADULT - PROBLEM SELECTOR PLAN 1
No Patient admitted for workup of sepsis of unclear etiology.   -SIRS Criteria on admission ( F 103.3) +Lactate  -WBC wnl  -On IV ceftriaxone will transition to PO Cipro today in preparation for d/c Patient admitted for workup of sepsis 2/2 pyelonephritis  -SIRS Criteria on admission ( F 103.3) +Lactate  -WBC wnl  -IV Ceftriaxone d/c- will switch to PO Cefpodoxime in prep for d/c

## 2021-07-07 NOTE — PROGRESS NOTE ADULT - SUBJECTIVE AND OBJECTIVE BOX
INTERVAL HPI/OVERNIGHT EVENTS:    Patient is a 41y old  Female who presents with a chief complaint of Nausea, Vomiting, Chills (2021 07:53)    FSG & Insulin received:    Yesterday:  pre-dinner fs  nutritional lispro 55  units + 0  units lispro SS  bedtime fs  lantus 75  units + 0   units lispro SS  2AM fs  0 units lispro  Today:  pre-breakfast fs  lantus 75 units + nutritional lispro 55  units+ 0   units lispro SS  pre-lunch fsg:  nutritional lispro   units+   units lispro SS    Pt reports the following symptoms:    CONSTITUTIONAL:  Negative fever or chills, feels well, good appetite  EYES:  Negative  blurry vision or double vision  CARDIOVASCULAR:  Negative for chest pain or palpitations  RESPIRATORY:  Negative for cough, wheezing, or SOB   GASTROINTESTINAL:  Negative for nausea, vomiting, diarrhea, constipation, or abdominal pain  GENITOURINARY:  Negative frequency, urgency or dysuria  NEUROLOGIC:  No headache, confusion, dizziness, lightheadedness    MEDICATIONS  (STANDING):  amLODIPine   Tablet 10 milliGRAM(s) Oral daily  cefpodoxime 200 milliGRAM(s) Oral every 12 hours  dextrose 40% Gel 15 Gram(s) Oral once  dextrose 40% Gel 15 Gram(s) Oral once  dextrose 5%. 1000 milliLiter(s) (50 mL/Hr) IV Continuous <Continuous>  dextrose 5%. 1000 milliLiter(s) (100 mL/Hr) IV Continuous <Continuous>  dextrose 5%. 1000 milliLiter(s) (50 mL/Hr) IV Continuous <Continuous>  dextrose 5%. 1000 milliLiter(s) (100 mL/Hr) IV Continuous <Continuous>  dextrose 5%. 1000 milliLiter(s) (100 mL/Hr) IV Continuous <Continuous>  dextrose 50% Injectable 25 Gram(s) IV Push once  dextrose 50% Injectable 12.5 Gram(s) IV Push once  dextrose 50% Injectable 25 Gram(s) IV Push once  dextrose 50% Injectable 25 Gram(s) IV Push once  dextrose 50% Injectable 12.5 Gram(s) IV Push once  dextrose 50% Injectable 25 Gram(s) IV Push once  enoxaparin Injectable 40 milliGRAM(s) SubCutaneous every 12 hours  glucagon  Injectable 1 milliGRAM(s) IntraMuscular once  glucagon  Injectable 1 milliGRAM(s) IntraMuscular once  glucagon  Injectable 1 milliGRAM(s) IntraMuscular once  insulin glargine Injectable (LANTUS) 75 Unit(s) SubCutaneous two times a day  insulin glargine Injectable (LANTUS) 30 Unit(s) SubCutaneous once  insulin lispro (ADMELOG) corrective regimen sliding scale   SubCutaneous Before meals and at bedtime  insulin lispro Injectable (ADMELOG) 65 Unit(s) SubCutaneous three times a day before meals  losartan 50 milliGRAM(s) Oral daily    MEDICATIONS  (PRN):  acetaminophen   Tablet .. 650 milliGRAM(s) Oral every 6 hours PRN Temp greater or equal to 38C (100.4F)  ibuprofen  Tablet. 400 milliGRAM(s) Oral every 6 hours PRN Moderate Pain (4 - 6)  metoclopramide Injectable 5 milliGRAM(s) IV Push every 6 hours PRN Nausea  oxyCODONE    IR 10 milliGRAM(s) Oral every 6 hours PRN Severe Pain (7 - 10)      PHYSICAL EXAM  Vital Signs Last 24 Hrs  T(C): 36.7 (2021 08:35), Max: 36.8 (2021 06:10)  T(F): 98.1 (2021 08:35), Max: 98.2 (2021 06:10)  HR: 83 (2021 08:35) (82 - 101)  BP: 136/78 (2021 08:35) (127/71 - 143/74)  BP(mean): --  RR: 18 (2021 08:35) (18 - 18)  SpO2: 98% (2021 08:35) (96% - 98%)    Constitutional: wn/wd in NAD.   HEENT: NCAT, MMM, OP clear, EOMI, , no proptosis or lid retraction  Neck: no thyromegaly or palpable thyroid nodules   Respiratory: lungs CTAB.  Cardiovascular: regular rhythm, normal S1 and S2, no audible murmurs, no peripheral edema  GI: soft, NT/ND, no masses/HSM appreciated.  Neurology: no tremors, DTR 2+  Skin: no visible rashes/lesions  Psychiatric: AAO x 3, normal affect/mood.    LABS:                        12.6   6.11  )-----------( 271      ( 2021 07:52 )             40.2     07-    135  |  100  |  9   ----------------------------<  348<H>  4.0   |  26  |  0.75    Ca    8.9      2021 07:52          Thyroid Stimulating Hormone, Serum: 1.367 uIU/mL ( @ 06:07)      HbA1C: 10.6        Insulin Sliding Scale requirements X 24 Hours:      RADIOLOGY & ADDITIONAL TESTS:      A/P: 41y Female with history of DM type II presenting for       1.  DM -     Please continue           units lantus at bedtime  / in the morning and        units lispro with meals and lispro moderate / low dose sliding scale 4 times daily with meals and at bedtime.  Please continue consistent carbohydrate diet.      Goal FSG is   Will continue to monitor   For discharge, pt can continue    Pt can follow up at discharge with [facility] by calling  to make an appointment.   Will discuss case with     and update primary team INTERVAL HPI/OVERNIGHT EVENTS:    Patient is a 41y old  Female who presents with a chief complaint of Nausea, Vomiting, Chills (2021 07:53)    FSG & Insulin received:    Yesterday:  pre-dinner fs  nutritional lispro 55  units + 0  units lispro SS  bedtime fs  lantus 75  units + 0   units lispro SS  2AM fs  0 units lispro  Today:  pre-breakfast fs  lantus 75 units + nutritional lispro 55  units+ 0   units lispro SS  pre-lunch fs  nutritional lispro 65  units+ 6  units lispro SS    Pt reports the following symptoms:    CONSTITUTIONAL:  Negative fever or chills, feels well, good appetite  EYES:  Negative  blurry vision or double vision  CARDIOVASCULAR:  Negative for chest pain or palpitations  RESPIRATORY:  Negative for cough, wheezing, or SOB   GASTROINTESTINAL:  Negative for nausea, vomiting, diarrhea, constipation, or abdominal pain  GENITOURINARY:  Negative frequency, urgency or dysuria  NEUROLOGIC:  No headache, confusion, dizziness, lightheadedness    MEDICATIONS  (STANDING):  amLODIPine   Tablet 10 milliGRAM(s) Oral daily  cefpodoxime 200 milliGRAM(s) Oral every 12 hours  dextrose 40% Gel 15 Gram(s) Oral once  dextrose 40% Gel 15 Gram(s) Oral once  dextrose 5%. 1000 milliLiter(s) (50 mL/Hr) IV Continuous <Continuous>  dextrose 5%. 1000 milliLiter(s) (100 mL/Hr) IV Continuous <Continuous>  dextrose 5%. 1000 milliLiter(s) (50 mL/Hr) IV Continuous <Continuous>  dextrose 5%. 1000 milliLiter(s) (100 mL/Hr) IV Continuous <Continuous>  dextrose 5%. 1000 milliLiter(s) (100 mL/Hr) IV Continuous <Continuous>  dextrose 50% Injectable 25 Gram(s) IV Push once  dextrose 50% Injectable 12.5 Gram(s) IV Push once  dextrose 50% Injectable 25 Gram(s) IV Push once  dextrose 50% Injectable 25 Gram(s) IV Push once  dextrose 50% Injectable 12.5 Gram(s) IV Push once  dextrose 50% Injectable 25 Gram(s) IV Push once  enoxaparin Injectable 40 milliGRAM(s) SubCutaneous every 12 hours  glucagon  Injectable 1 milliGRAM(s) IntraMuscular once  glucagon  Injectable 1 milliGRAM(s) IntraMuscular once  glucagon  Injectable 1 milliGRAM(s) IntraMuscular once  insulin glargine Injectable (LANTUS) 75 Unit(s) SubCutaneous two times a day  insulin glargine Injectable (LANTUS) 30 Unit(s) SubCutaneous once  insulin lispro (ADMELOG) corrective regimen sliding scale   SubCutaneous Before meals and at bedtime  insulin lispro Injectable (ADMELOG) 65 Unit(s) SubCutaneous three times a day before meals  losartan 50 milliGRAM(s) Oral daily    MEDICATIONS  (PRN):  acetaminophen   Tablet .. 650 milliGRAM(s) Oral every 6 hours PRN Temp greater or equal to 38C (100.4F)  ibuprofen  Tablet. 400 milliGRAM(s) Oral every 6 hours PRN Moderate Pain (4 - 6)  metoclopramide Injectable 5 milliGRAM(s) IV Push every 6 hours PRN Nausea  oxyCODONE    IR 10 milliGRAM(s) Oral every 6 hours PRN Severe Pain (7 - 10)      PHYSICAL EXAM  Vital Signs Last 24 Hrs  T(C): 36.7 (2021 08:35), Max: 36.8 (2021 06:10)  T(F): 98.1 (2021 08:35), Max: 98.2 (2021 06:10)  HR: 83 (2021 08:35) (82 - 101)  BP: 136/78 (2021 08:35) (127/71 - 143/74)  BP(mean): --  RR: 18 (2021 08:35) (18 - 18)  SpO2: 98% (2021 08:35) (96% - 98%)    Constitutional: wn/wd in NAD.   HEENT: NCAT, MMM, OP clear, EOMI, , no proptosis or lid retraction  Neck: no thyromegaly or palpable thyroid nodules   Respiratory: lungs CTAB.  Cardiovascular: regular rhythm, normal S1 and S2, no audible murmurs, no peripheral edema  GI: soft, NT/ND, no masses/HSM appreciated.  Neurology: no tremors, DTR 2+  Skin: no visible rashes/lesions  Psychiatric: AAO x 3, normal affect/mood.    LABS:                        12.6   6.11  )-----------( 271      ( 2021 07:52 )             40.2     07-07    135  |  100  |  9   ----------------------------<  348<H>  4.0   |  26  |  0.75    Ca    8.9      2021 07:52          Thyroid Stimulating Hormone, Serum: 1.367 uIU/mL ( @ 06:07)      HbA1C: 10.6        Insulin Sliding Scale requirements X 24 Hours:      RADIOLOGY & ADDITIONAL TESTS:        A/P:  41yFemale, morbidly obese, with PMH of uncontrolled DM2, HTN, HLD, and recent admission for vulvovaginal candidiasis (treated with fluconazole, clotrimazole) and e.Coli UTI w diffuse abd pain, gradual onset diffuse headache, vomiting, elevated BP for one day.   Endo consulted for DM management.     1.  Uncontrolled type 2 DM  A1c:10.6%  wt:140, BMI: 48   Please keep lantus 75  units BID. Add additional lantus 30 units at 2pm daily   please keep Lispro 65 U TID premeals.   please change  Iv fluid with antibiotics from D5 to NS or 0.45 NS   Please give lispro sliding scale  q 4h.   if fs-199: 3 u   200-249: 6 u  250-299: 9 u  300-350: 12 U   >350 : 15 U  Goal FSG is   Will continue to monitor   For discharge, pt can continue    Pt can follow up at discharge with [facility] by calling  to make an appointment.   Will discuss case with     and update primary team

## 2021-07-07 NOTE — PROGRESS NOTE ADULT - SUBJECTIVE AND OBJECTIVE BOX
OVERNIGHT EVENTS: rubén     SUBJECTIVE:  Patient seen and examined at bedside. Reports no fever/chills last night and a mild headache, reduced compared to nights prior. No other complaints at this time.     Vital Signs Last 12 Hrs  T(F): 98.2 (07-07-21 @ 06:10), Max: 98.2 (07-07-21 @ 06:10)  HR: 82 (07-07-21 @ 06:10) (82 - 101)  BP: 127/71 (07-07-21 @ 06:10) (127/71 - 143/74)  BP(mean): --  RR: 18 (07-07-21 @ 06:10) (18 - 18)  SpO2: 96% (07-07-21 @ 06:10) (96% - 98%)  I&O's Summary      PHYSICAL EXAM:  Constitutional: NAD, comfortable in bed.  HEENT: NC/AT, PERRLA, EOMI, no conjunctival pallor or scleral icterus, MMM  Neck: Supple, no JVD  Respiratory: CTA B/L. No w/r/r.   Cardiovascular: RRR, normal S1 and S2, no m/r/g.   Gastrointestinal: +BS, soft NTND, no guarding or rebound tenderness, no palpable masses   Extremities: wwp; no cyanosis, clubbing or edema.   Vascular: Pulses equal and strong throughout.   Neurological: AAOx3, no CN deficits, strength and sensation intact throughout.   Skin: No gross skin abnormalities or rashes        LABS:                        13.2   6.15  )-----------( 293      ( 06 Jul 2021 11:07 )             42.0     07-06    138  |  98  |  9   ----------------------------<  172<H>  3.3<L>   |  26  |  0.70    Ca    9.2      06 Jul 2021 20:23    TPro  7.5  /  Alb  3.0<L>  /  TBili  0.3  /  DBili  <0.2  /  AST  SEE NOTE  /  ALT  SEE NOTE  /  AlkPhos  141<H>  07-05            RADIOLOGY & ADDITIONAL TESTS:    MEDICATIONS  (STANDING):  amLODIPine   Tablet 10 milliGRAM(s) Oral daily  cefTRIAXone   IVPB 2000 milliGRAM(s) IV Intermittent every 24 hours  dextrose 40% Gel 15 Gram(s) Oral once  dextrose 40% Gel 15 Gram(s) Oral once  dextrose 5%. 1000 milliLiter(s) (50 mL/Hr) IV Continuous <Continuous>  dextrose 5%. 1000 milliLiter(s) (100 mL/Hr) IV Continuous <Continuous>  dextrose 5%. 1000 milliLiter(s) (50 mL/Hr) IV Continuous <Continuous>  dextrose 5%. 1000 milliLiter(s) (100 mL/Hr) IV Continuous <Continuous>  dextrose 50% Injectable 25 Gram(s) IV Push once  dextrose 50% Injectable 12.5 Gram(s) IV Push once  dextrose 50% Injectable 25 Gram(s) IV Push once  dextrose 50% Injectable 25 Gram(s) IV Push once  dextrose 50% Injectable 12.5 Gram(s) IV Push once  dextrose 50% Injectable 25 Gram(s) IV Push once  enoxaparin Injectable 40 milliGRAM(s) SubCutaneous every 12 hours  glucagon  Injectable 1 milliGRAM(s) IntraMuscular once  glucagon  Injectable 1 milliGRAM(s) IntraMuscular once  insulin glargine Injectable (LANTUS) 75 Unit(s) SubCutaneous two times a day  insulin lispro (ADMELOG) corrective regimen sliding scale   SubCutaneous Before meals and at bedtime  insulin lispro Injectable (ADMELOG) 55 Unit(s) SubCutaneous three times a day before meals  losartan 50 milliGRAM(s) Oral daily    MEDICATIONS  (PRN):  acetaminophen   Tablet .. 650 milliGRAM(s) Oral every 6 hours PRN Temp greater or equal to 38C (100.4F)  ibuprofen  Tablet. 400 milliGRAM(s) Oral every 6 hours PRN Moderate Pain (4 - 6)  metoclopramide Injectable 5 milliGRAM(s) IV Push every 6 hours PRN Nausea  oxyCODONE    IR 10 milliGRAM(s) Oral every 6 hours PRN Severe Pain (7 - 10)

## 2021-07-07 NOTE — PROGRESS NOTE ADULT - PROBLEM SELECTOR PLAN 5
Patient reports diffuse headache onset early AM 07/4   -Improvement noted on 07/7 likely in context of resolving infection.   -no meningeal signs. Patient describes durbin as consistent with prior hx of migraines. Ddx includes migraine vs increased ICP.   - Treat with motrin 800 prn for moderate pain, oxy for severe pain  - denies visual changes or other neurologic complaints

## 2021-07-07 NOTE — PROGRESS NOTE ADULT - PROBLEM SELECTOR PLAN 2
-RTP u/s + CVA tenderness on exam  -Urine culture/sensitivies resulted -Urine culture + for E. Coli   -CT abd wnl.  -Sensitivities resulted--patient will be switched to PO Cefpodoxime

## 2021-07-07 NOTE — DISCHARGE NOTE ADULT - NS MD DC PLAN IMMU FLU REF OTH
This visit is being performed virtually.  Clinician Location: Luke Graham's Location: Home   Called patient in order to introduce the supportive counseling program.  Patient briefly talked about thoughts and feelings related to cancer and life.  She is interested in getting connected with behavioral health therapist.  Will have Dr. Casey's office put in referral.  Provided patient with information about how to call in order to schedule.  She stated that she will call this provider if supportive counseling is needed in the future.       Not indicated for this patient

## 2021-07-07 NOTE — PROGRESS NOTE ADULT - PROBLEM SELECTOR PLAN 6
Patient with history of hyperlipidemia per record   -not on any current therapy.

## 2021-07-07 NOTE — PROGRESS NOTE ADULT - ASSESSMENT
40 year old morbidly obese female, with PMH of uncontrolled DM2, HTN, HLD, and recent admission for vulvovaginal candidiasis (treated with fluconazole, clotrimazole) and e.Coli UTI w diffuse abd pain, gradual onset diffuse headache, vomiting, and elevated BP for one day likely in setting of sepsis/uncontrolled dm 40 year old morbidly obese female, with PMH of uncontrolled DM2, HTN, HLD, and recent admission for vulvovaginal candidiasis (treated with fluconazole, clotrimazole) and e.Coli UTI w diffuse abd pain, gradual onset diffuse headache, vomiting, and elevated BP for one day likely in setting of sepsis 2/2 pyelonephritis with poorly controlled DM

## 2021-07-08 ENCOUNTER — TRANSCRIPTION ENCOUNTER (OUTPATIENT)
Age: 41
End: 2021-07-08

## 2021-07-08 LAB
GLUCOSE BLDC GLUCOMTR-MCNC: 189 MG/DL — HIGH (ref 70–99)
GLUCOSE BLDC GLUCOMTR-MCNC: 240 MG/DL — HIGH (ref 70–99)
GLUCOSE BLDC GLUCOMTR-MCNC: 277 MG/DL — HIGH (ref 70–99)
GLUCOSE BLDC GLUCOMTR-MCNC: 290 MG/DL — HIGH (ref 70–99)

## 2021-07-08 PROCEDURE — 99231 SBSQ HOSP IP/OBS SF/LOW 25: CPT | Mod: GC

## 2021-07-08 PROCEDURE — 99233 SBSQ HOSP IP/OBS HIGH 50: CPT | Mod: GC

## 2021-07-08 RX ORDER — DIPHENHYDRAMINE HCL 50 MG
1 CAPSULE ORAL
Qty: 5 | Refills: 0
Start: 2021-07-08 | End: 2021-07-12

## 2021-07-08 RX ORDER — DIPHENHYDRAMINE HCL 50 MG
25 CAPSULE ORAL EVERY 4 HOURS
Refills: 0 | Status: DISCONTINUED | OUTPATIENT
Start: 2021-07-08 | End: 2021-07-09

## 2021-07-08 RX ORDER — IBUPROFEN 200 MG
1 TABLET ORAL
Qty: 0 | Refills: 0 | DISCHARGE
Start: 2021-07-08

## 2021-07-08 RX ORDER — CEPHALEXIN 500 MG
1 CAPSULE ORAL
Qty: 24 | Refills: 0
Start: 2021-07-08 | End: 2021-07-13

## 2021-07-08 RX ORDER — IBUPROFEN 200 MG
1 TABLET ORAL
Qty: 7 | Refills: 0
Start: 2021-07-08 | End: 2021-07-14

## 2021-07-08 RX ORDER — INSULIN GLARGINE 100 [IU]/ML
45 INJECTION, SOLUTION SUBCUTANEOUS ONCE
Refills: 0 | Status: COMPLETED | OUTPATIENT
Start: 2021-07-08 | End: 2021-07-08

## 2021-07-08 RX ORDER — CEFPODOXIME PROXETIL 100 MG
1 TABLET ORAL
Qty: 0 | Refills: 0 | DISCHARGE
Start: 2021-07-08

## 2021-07-08 RX ORDER — CEFPODOXIME PROXETIL 100 MG
1 TABLET ORAL
Qty: 18 | Refills: 0
Start: 2021-07-08 | End: 2021-07-16

## 2021-07-08 RX ORDER — FLUCONAZOLE 150 MG/1
150 TABLET ORAL ONCE
Refills: 0 | Status: COMPLETED | OUTPATIENT
Start: 2021-07-08 | End: 2021-07-08

## 2021-07-08 RX ORDER — IBUPROFEN 200 MG
1 TABLET ORAL
Qty: 28 | Refills: 0
Start: 2021-07-08 | End: 2021-07-14

## 2021-07-08 RX ORDER — ALBUTEROL 90 UG/1
1 AEROSOL, METERED ORAL
Qty: 1 | Refills: 0
Start: 2021-07-08 | End: 2021-08-06

## 2021-07-08 RX ADMIN — INSULIN GLARGINE 75 UNIT(S): 100 INJECTION, SOLUTION SUBCUTANEOUS at 08:42

## 2021-07-08 RX ADMIN — Medication 9: at 22:51

## 2021-07-08 RX ADMIN — Medication 200 MILLIGRAM(S): at 18:00

## 2021-07-08 RX ADMIN — Medication 65 UNIT(S): at 18:01

## 2021-07-08 RX ADMIN — OXYCODONE HYDROCHLORIDE 10 MILLIGRAM(S): 5 TABLET ORAL at 12:42

## 2021-07-08 RX ADMIN — FLUCONAZOLE 150 MILLIGRAM(S): 150 TABLET ORAL at 10:09

## 2021-07-08 RX ADMIN — LOSARTAN POTASSIUM 50 MILLIGRAM(S): 100 TABLET, FILM COATED ORAL at 06:22

## 2021-07-08 RX ADMIN — ENOXAPARIN SODIUM 40 MILLIGRAM(S): 100 INJECTION SUBCUTANEOUS at 21:33

## 2021-07-08 RX ADMIN — Medication 25 MILLIGRAM(S): at 08:41

## 2021-07-08 RX ADMIN — Medication 65 UNIT(S): at 12:37

## 2021-07-08 RX ADMIN — OXYCODONE HYDROCHLORIDE 10 MILLIGRAM(S): 5 TABLET ORAL at 06:22

## 2021-07-08 RX ADMIN — OXYCODONE HYDROCHLORIDE 10 MILLIGRAM(S): 5 TABLET ORAL at 06:50

## 2021-07-08 RX ADMIN — Medication 200 MILLIGRAM(S): at 06:22

## 2021-07-08 RX ADMIN — Medication 3: at 18:01

## 2021-07-08 RX ADMIN — INSULIN GLARGINE 75 UNIT(S): 100 INJECTION, SOLUTION SUBCUTANEOUS at 22:52

## 2021-07-08 RX ADMIN — Medication 6: at 12:37

## 2021-07-08 RX ADMIN — AMLODIPINE BESYLATE 10 MILLIGRAM(S): 2.5 TABLET ORAL at 06:22

## 2021-07-08 RX ADMIN — Medication 65 UNIT(S): at 08:44

## 2021-07-08 RX ADMIN — OXYCODONE HYDROCHLORIDE 10 MILLIGRAM(S): 5 TABLET ORAL at 23:28

## 2021-07-08 RX ADMIN — ENOXAPARIN SODIUM 40 MILLIGRAM(S): 100 INJECTION SUBCUTANEOUS at 08:41

## 2021-07-08 RX ADMIN — INSULIN GLARGINE 45 UNIT(S): 100 INJECTION, SOLUTION SUBCUTANEOUS at 18:01

## 2021-07-08 RX ADMIN — OXYCODONE HYDROCHLORIDE 10 MILLIGRAM(S): 5 TABLET ORAL at 14:37

## 2021-07-08 RX ADMIN — INSULIN GLARGINE 30 UNIT(S): 100 INJECTION, SOLUTION SUBCUTANEOUS at 15:19

## 2021-07-08 RX ADMIN — Medication 9: at 08:44

## 2021-07-08 RX ADMIN — Medication 5 MILLIGRAM(S): at 06:22

## 2021-07-08 RX ADMIN — Medication 25 MILLIGRAM(S): at 13:42

## 2021-07-08 NOTE — PROGRESS NOTE ADULT - SUBJECTIVE AND OBJECTIVE BOX
INTERVAL HPI/OVERNIGHT EVENTS:    Patient is a 41y old  Female who presents with a chief complaint of Nausea, Vomiting, Chills (2021 11:44)    Patient reports still having headache, was plan for discharge today but wanting to stay for another day.    FSG & Insulin received:    Yesterday:  pre-dinner fs  nutritional lispro 65  units + 3  units lispro SS  bedtime fs  lantus 75  units +  6  units lispro SS    Today:  pre-breakfast fs  lantus 75  units+ nutritional lispro 65  units+ 9   units lispro SS  pre-lunch fs  nutritional lispro 65  units+ 6  units lispro SS  Lantus 30 units + Lantus 45 units       Pt reports the following symptoms:    CONSTITUTIONAL:  Negative fever or chills, feels well, good appetite  EYES:  Negative  blurry vision or double vision  CARDIOVASCULAR:  Negative for chest pain or palpitations  RESPIRATORY:  Negative for cough, wheezing, or SOB   GASTROINTESTINAL:  Negative for nausea, vomiting, diarrhea, constipation, or abdominal pain  GENITOURINARY:  Negative frequency, urgency or dysuria  NEUROLOGIC:  No headache, confusion, dizziness, lightheadedness    MEDICATIONS  (STANDING):  amLODIPine   Tablet 10 milliGRAM(s) Oral daily  cefpodoxime 200 milliGRAM(s) Oral every 12 hours  dextrose 40% Gel 15 Gram(s) Oral once  dextrose 40% Gel 15 Gram(s) Oral once  dextrose 5%. 1000 milliLiter(s) (50 mL/Hr) IV Continuous <Continuous>  dextrose 5%. 1000 milliLiter(s) (100 mL/Hr) IV Continuous <Continuous>  dextrose 5%. 1000 milliLiter(s) (50 mL/Hr) IV Continuous <Continuous>  dextrose 5%. 1000 milliLiter(s) (100 mL/Hr) IV Continuous <Continuous>  dextrose 5%. 1000 milliLiter(s) (100 mL/Hr) IV Continuous <Continuous>  dextrose 50% Injectable 25 Gram(s) IV Push once  dextrose 50% Injectable 12.5 Gram(s) IV Push once  dextrose 50% Injectable 25 Gram(s) IV Push once  dextrose 50% Injectable 25 Gram(s) IV Push once  dextrose 50% Injectable 12.5 Gram(s) IV Push once  dextrose 50% Injectable 25 Gram(s) IV Push once  enoxaparin Injectable 40 milliGRAM(s) SubCutaneous every 12 hours  glucagon  Injectable 1 milliGRAM(s) IntraMuscular once  glucagon  Injectable 1 milliGRAM(s) IntraMuscular once  glucagon  Injectable 1 milliGRAM(s) IntraMuscular once  insulin glargine Injectable (LANTUS) 75 Unit(s) SubCutaneous two times a day  insulin lispro (ADMELOG) corrective regimen sliding scale   SubCutaneous Before meals and at bedtime  insulin lispro Injectable (ADMELOG) 65 Unit(s) SubCutaneous three times a day before meals  losartan 50 milliGRAM(s) Oral daily    MEDICATIONS  (PRN):  acetaminophen   Tablet .. 650 milliGRAM(s) Oral every 6 hours PRN Temp greater or equal to 38C (100.4F)  diphenhydrAMINE 25 milliGRAM(s) Oral every 4 hours PRN Rash and/or Itching  ibuprofen  Tablet. 400 milliGRAM(s) Oral every 6 hours PRN Moderate Pain (4 - 6)  metoclopramide Injectable 5 milliGRAM(s) IV Push every 6 hours PRN Nausea  oxyCODONE    IR 10 milliGRAM(s) Oral every 6 hours PRN Severe Pain (7 - 10)      PHYSICAL EXAM  Vital Signs Last 24 Hrs  T(C): 37 (2021 15:54), Max: 37.1 (2021 20:38)  T(F): 98.6 (2021 15:54), Max: 98.7 (2021 20:38)  HR: 104 (2021 15:54) (82 - 104)  BP: 151/78 (2021 15:54) (128/84 - 151/78)  BP(mean): --  RR: 18 (2021 15:54) (18 - 18)  SpO2: 97% (2021 15:54) (96% - 97%)    Constitutional: wn/wd in NAD.   HEENT: NCAT, MMM, OP clear, EOMI, , no proptosis or lid retraction  Neck: no thyromegaly or palpable thyroid nodules   Respiratory: lungs CTAB.  Cardiovascular: regular rhythm, normal S1 and S2, no audible murmurs, no peripheral edema  GI: soft, NT/ND, no masses/HSM appreciated.  Neurology: no tremors, DTR 2+  Skin: no visible rashes/lesions  Psychiatric: AAO x 3, normal affect/mood.    LABS:                        12.6   6.11  )-----------( 271      ( 2021 07:52 )             40.2     -    135  |  100  |  9   ----------------------------<  348<H>  4.0   |  26  |  0.75    Ca    8.9      2021 07:52          Thyroid Stimulating Hormone, Serum: 1.367 uIU/mL ( @ 06:07)      HbA1C:         Insulin Sliding Scale requirements X 24 Hours:      RADIOLOGY & ADDITIONAL TESTS:        A/P:  41yFemale, morbidly obese, with PMH of uncontrolled DM2, HTN, HLD, and recent admission for vulvovaginal candidiasis (treated with fluconazole, clotrimazole) and e.Coli UTI w diffuse abd pain, gradual onset diffuse headache, vomiting, elevated BP for one day.   Endo consulted for DM management.     1.  Uncontrolled type 2 DM  A1c:10.6%  wt:140, BMI: 48   Please keep lantus 75  units BID. Give lantus 45 units at 2pm daily   please keep Lispro 65 U TID premeals.   please change  Iv fluid with antibiotics from D5 to NS or 0.45 NS   Please give lispro sliding scale  q 4h.   if fs-199: 3 u   200-249: 6 u  250-299: 9 u  300-350: 12 U   >350 : 15 U  Goal FSG is   Will continue to monitor   For discharge, pt can continue home regimen     Pt can follow up at discharge with Dr. Joseph  Will discuss case with Dr. Bowden and update primary team

## 2021-07-08 NOTE — DISCHARGE NOTE NURSING/CASE MANAGEMENT/SOCIAL WORK - NSDCVIVACCINE_GEN_ALL_CORE_FT
Tdap; 25-Apr-2019 12:41; Neena Chavez (ARMANDO); [employer]; [id number] (Exp. Date: 02-Nov-2020); IntraMuscular; Deltoid Right.; 0.5 milliLiter(s); VIS (VIS Published: 09-May-2013, VIS Presented: 25-Apr-2019);

## 2021-07-08 NOTE — PROGRESS NOTE ADULT - PROVIDER SPECIALTY LIST ADULT
Endocrinology
Internal Medicine

## 2021-07-08 NOTE — PROGRESS NOTE ADULT - ATTENDING COMMENTS
41yoF h/o poorly controlled T2DM with severe insulin resistance, obesity, HTN with previous admissions for E coli UTI, HTN emergency admitted for 1 day of fever, diarrhea, abdominal pain, vomiting, headache. She was treated for sepsis with antibiotics. She was started on lantus 65 bid, lispro 40 prior to meals and a lispro correction scale with insulin sensitivity factor 1:16 above goal  and FSGs are still very much above goal, speaking to her severe insulin resistance. She continues to endorse severe headache and expresses her need for more pain medication.     Agree with increasing lantus to 75u bid, keeping Lispro at 40 u qAc and keeping the current correction scale. Please make sure that dextrose-containing fluids are not used as the solvent for her IV meds.     Laverne Coyle MD  Endocrinology Attending    I spent 25 minutes on the total encounter. More than 50% of the visit was spent counseling and/or coordinating care by me, the attending physician.
Pt seen on rounds this afternoon and events of the weekend reviewed.  Pt is known to me from a previous admission.  Is morbidly obese and markedly insulin-resistant, on 195 units of U-500 insulin TID as outpatient (plus metformin and Ozempic).  Glucoses have improved somewhat on the U-500, but are still in the upper 200s at home, and her A1c level is still markedly elevated.  In the hospital has been switched to Lantus (now 75 units BID) and lispro (40 units TID/AC) Glucoses are still mostly in the 300 range.  Says that she has not been snacking between meals or overnight, though has had food at the bedside  Is still awaiting control of her hyperglycemia so that she can have bariatric surgery.  Says that her hyperglycemia at home is triggered by pain, but is vague about how often this occurs.  (Has gained 15 lb on the U-500, so caloric intake remains a problem).  Pain appears to be mostly migraines, which occur as much as 10X/month.  She is being followed by Neurology, but says that she is not on any migraine prophylaxis.    Will keep the Lantus at 75 untis BID, increase the pre-meal to 55 units TID  Will try to investigate outpatient records to see if any migraine prophylaxis (Topomax, etc) has been tried.
Pt seen on rounds this afternoon.  Glucoses have improved into the 200 range despite the overnight rise to 304 this morning (which she says was due to increased headache overnight).  We gave an extra 30 units of Lantus at 2 PM today, and increased the pre-meal insulin to 65 units.  I had reviewed her outpatient records in detail and discussed this with her--berlin her Neurology follow-up, since her uncontrolled migraines appear to be her biggest issue in terms of pain.  She has seen Dr. Rahman once--last November.   She was started on Topomax 25 mg/day (increase to 50 mg after 1 week).  Says that she took the medication for only 2 weeks and then stopped it "because it didn't work."  Explained that this was only a starting dose, and explained why the dose Is increased gradually, etc, and that it would not be expected to work in 2 weeks.  I am not sure that I "got through."  She has not gone back for Neuro follow-up since then, but clearly needs to  She also had a sleep study which showed severe KARY.  She has not been back to Dr. Parrish regarding the result--?? expected a call from her office.  I have emphasized the need for her to follow up with these MDs, while acknowledging that multiple visits are necessary and that this Is a lot of work.  She also needs to have [employer] approved to help with transport to the offices
Pt seen on rounds this afternoon.  Glucoses are improved but still elevated--most values in the low to mid 200 range.    Again received an extra 30 units of Lantus at 2 PM today.  For possible discharge later today.  --Will give an extra 15 units Lantus now  --Increase the midday dose to 45 units if she is not discharged  --If discharged today, she will return to use of the u-500 insulin and see Dr. Joseph for follow-up  --Strongly emphasized the need for her to return for follow-up with Dr. Rahman to get on a migraine prophylaxis regimen--partially because the pt claims that her headache pain contributes significantly to her hyperglycemia, and partly because of the long term risk of her treating her headaches with large amounts of NSAIDS
agree with assessment and plan as documented by resident.  -continue glucose control; will discuss with endocrine team regarding potentially NPH as patient using lantus > 50U BID   -continue pain control for headaches  -f/u culture sensitivities and transition to PO abx as appropriate
40 year old morbidly obese female, with PMH of uncontrolled DM2, HTN, HLD p/w abd pain and dysuria found to have sepsis likely 2/2 UTI and hyperglycemia.  Continue insulin and SSI, monitor glucose.  Continue abx for e coli UTI and f/u culture sens.  Continue antihypertensive.  Monitor headache as previously with neurology and ophth evaluation at prior hospitalization.  DVT PPx and dispo pending
Patient seen and examined at bedside. Agree with exam, a/p as above with the following addendum/edits:     Sepsis 2/2 E. coli pyelo, can transition to cefpodoxime. F/u endo recs for uncontrolled DM.

## 2021-07-08 NOTE — DISCHARGE NOTE NURSING/CASE MANAGEMENT/SOCIAL WORK - PATIENT PORTAL LINK FT
You can access the FollowMyHealth Patient Portal offered by [facility] by registering at the following website: [email or web address]. By joining [employer]’s FollowMyHealth portal, you will also be able to view your health information using other applications (apps) compatible with our system.

## 2021-07-08 NOTE — DISCHARGE NOTE NURSING/CASE MANAGEMENT/SOCIAL WORK - NSDCFUADDAPPT_GEN_ALL_CORE_FT
(1) Please note that office of your Neurology Provider, Dr. Mariya Rahman from phone [phone] to schedule an appointment briefly following your hospital discharge.    Appointment was facilitated by Ms. NAGI Tam, Referral Coordinator.    (2) We were unable to schedule an appointment with a Gynecology Provider as they are out of network with your insurance. Please follow up with your insurance carrier for further assistance in scheduling this appointment.    Appointment was facilitated by Ms. NAGI Tam, Referral Coordinator.

## 2021-07-08 NOTE — CHART NOTE - NSCHARTNOTEFT_GEN_A_CORE
At 4:00pm, patient was given 24 hour notice. Discussed the stipulations of 24 hour notice and she was informed of her right to appeal however she declined. Patient agreed and understood. Form was signed and placed in chart. Patient medically ready for d/c

## 2021-07-09 VITALS
DIASTOLIC BLOOD PRESSURE: 84 MMHG | HEART RATE: 99 BPM | TEMPERATURE: 99 F | RESPIRATION RATE: 18 BRPM | SYSTOLIC BLOOD PRESSURE: 137 MMHG | OXYGEN SATURATION: 96 %

## 2021-07-09 LAB
CULTURE RESULTS: SIGNIFICANT CHANGE UP
CULTURE RESULTS: SIGNIFICANT CHANGE UP
GLUCOSE BLDC GLUCOMTR-MCNC: 323 MG/DL — HIGH (ref 70–99)
GLUCOSE BLDC GLUCOMTR-MCNC: 331 MG/DL — HIGH (ref 70–99)
SPECIMEN SOURCE: SIGNIFICANT CHANGE UP
SPECIMEN SOURCE: SIGNIFICANT CHANGE UP

## 2021-07-09 PROCEDURE — 86769 SARS-COV-2 COVID-19 ANTIBODY: CPT

## 2021-07-09 PROCEDURE — 81001 URINALYSIS AUTO W/SCOPE: CPT

## 2021-07-09 PROCEDURE — 83036 HEMOGLOBIN GLYCOSYLATED A1C: CPT

## 2021-07-09 PROCEDURE — 96376 TX/PRO/DX INJ SAME DRUG ADON: CPT

## 2021-07-09 PROCEDURE — 71045 X-RAY EXAM CHEST 1 VIEW: CPT

## 2021-07-09 PROCEDURE — 87186 SC STD MICRODIL/AGAR DIL: CPT

## 2021-07-09 PROCEDURE — 80053 COMPREHEN METABOLIC PANEL: CPT

## 2021-07-09 PROCEDURE — 85610 PROTHROMBIN TIME: CPT

## 2021-07-09 PROCEDURE — 80048 BASIC METABOLIC PNL TOTAL CA: CPT

## 2021-07-09 PROCEDURE — 85730 THROMBOPLASTIN TIME PARTIAL: CPT

## 2021-07-09 PROCEDURE — 99285 EMERGENCY DEPT VISIT HI MDM: CPT

## 2021-07-09 PROCEDURE — U0003: CPT

## 2021-07-09 PROCEDURE — 87086 URINE CULTURE/COLONY COUNT: CPT

## 2021-07-09 PROCEDURE — 70450 CT HEAD/BRAIN W/O DYE: CPT

## 2021-07-09 PROCEDURE — 96375 TX/PRO/DX INJ NEW DRUG ADDON: CPT

## 2021-07-09 PROCEDURE — 36415 COLL VENOUS BLD VENIPUNCTURE: CPT

## 2021-07-09 PROCEDURE — 80076 HEPATIC FUNCTION PANEL: CPT

## 2021-07-09 PROCEDURE — 99239 HOSP IP/OBS DSCHRG MGMT >30: CPT | Mod: GC

## 2021-07-09 PROCEDURE — 82962 GLUCOSE BLOOD TEST: CPT

## 2021-07-09 PROCEDURE — U0005: CPT

## 2021-07-09 PROCEDURE — 93005 ELECTROCARDIOGRAM TRACING: CPT

## 2021-07-09 PROCEDURE — 82803 BLOOD GASES ANY COMBINATION: CPT

## 2021-07-09 PROCEDURE — 85027 COMPLETE CBC AUTOMATED: CPT

## 2021-07-09 PROCEDURE — 96374 THER/PROPH/DIAG INJ IV PUSH: CPT

## 2021-07-09 PROCEDURE — 74177 CT ABD & PELVIS W/CONTRAST: CPT

## 2021-07-09 PROCEDURE — 83605 ASSAY OF LACTIC ACID: CPT

## 2021-07-09 PROCEDURE — 87040 BLOOD CULTURE FOR BACTERIA: CPT

## 2021-07-09 PROCEDURE — 85025 COMPLETE CBC W/AUTO DIFF WBC: CPT

## 2021-07-09 RX ORDER — DIPHENHYDRAMINE HCL 50 MG
25 CAPSULE ORAL ONCE
Refills: 0 | Status: COMPLETED | OUTPATIENT
Start: 2021-07-09 | End: 2021-07-09

## 2021-07-09 RX ADMIN — OXYCODONE HYDROCHLORIDE 10 MILLIGRAM(S): 5 TABLET ORAL at 09:55

## 2021-07-09 RX ADMIN — Medication 5 MILLIGRAM(S): at 10:12

## 2021-07-09 RX ADMIN — Medication 25 MILLIGRAM(S): at 01:16

## 2021-07-09 RX ADMIN — OXYCODONE HYDROCHLORIDE 10 MILLIGRAM(S): 5 TABLET ORAL at 15:00

## 2021-07-09 RX ADMIN — Medication 25 MILLIGRAM(S): at 01:31

## 2021-07-09 RX ADMIN — LOSARTAN POTASSIUM 50 MILLIGRAM(S): 100 TABLET, FILM COATED ORAL at 07:02

## 2021-07-09 RX ADMIN — INSULIN GLARGINE 75 UNIT(S): 100 INJECTION, SOLUTION SUBCUTANEOUS at 08:33

## 2021-07-09 RX ADMIN — Medication 65 UNIT(S): at 08:42

## 2021-07-09 RX ADMIN — Medication 12: at 12:12

## 2021-07-09 RX ADMIN — OXYCODONE HYDROCHLORIDE 10 MILLIGRAM(S): 5 TABLET ORAL at 00:28

## 2021-07-09 RX ADMIN — Medication 200 MILLIGRAM(S): at 07:02

## 2021-07-09 RX ADMIN — ENOXAPARIN SODIUM 40 MILLIGRAM(S): 100 INJECTION SUBCUTANEOUS at 08:43

## 2021-07-09 RX ADMIN — Medication 65 UNIT(S): at 12:12

## 2021-07-09 RX ADMIN — Medication 12: at 08:42

## 2021-07-09 RX ADMIN — AMLODIPINE BESYLATE 10 MILLIGRAM(S): 2.5 TABLET ORAL at 07:02

## 2021-07-09 RX ADMIN — OXYCODONE HYDROCHLORIDE 10 MILLIGRAM(S): 5 TABLET ORAL at 08:55

## 2021-07-15 NOTE — ED ADULT NURSE NOTE - NS ED NURSE DISCH DISPOSITION
Patient: Sulaiman Dubois  MRN: [id number]  : 1964    Blood pressure (!) 140/78, pulse 90, temperature 97.1 °F (36.2 °C), temperature source Temporal, resp. rate 18, SpO2 98 %.    Chief Complaint   Patient presents with   • Procedure     Endovenous ablation of the right lesser saphenous vein.       Referring provider:  Ky Gao MD    ALLERGIES:   Allergen Reactions   • Penicillins Other (See Comments)       Current Outpatient Medications   Medication Sig Dispense Refill   • diazePAM (VALIUM) 5 MG tablet Take 5 mg by mouth as needed. Total of 1 tab     • losartan (COZAAR) 25 MG tablet Take 25 mg by mouth daily.     • Insulin Glargine-Lixisenatide (Soliqua) 100-33 UNT-MCG/ML Solution Pen-injector Pt reported 26 units daily     • HYDROcodone-acetaminophen (NORCO) 5-325 MG per tablet Take 1 tablet by mouth every 6 hours as needed for Pain. 8 tablet 0   • aspirin 81 MG EC tablet TAKE 1 TABLET DAILY.     • metformin (GLUCOPHAGE) 1000 MG tablet Pt reported 500 mg  Twice daily     • hydrochlorothiazide (HYDRODIURIL) 25 MG tablet TAKE 1 TABLET DAILY.     • sitaGLIPtin (JANUVIA) 100 MG tablet TAKE 1 TABLET DAILY.     • simvastatin (ZOCOR) 10 MG tablet TAKE 1 TABLET BEDTIME     • B Complex-C (SUPER B COMPLEX PO)      • valsartan (DIOVAN) 160 MG tablet TAKE 1 TABLET DAILY.     • Cholecalciferol (VITAMIN D3) 1000 units capsule daily       No current facility-administered medications for this visit.       HPI:  The patient is a 56-year-old female with a long-standing history of right lower extremity swelling and pain.  She had the symptoms for over a year and they were getting worse. She reported pain, throbbing and swelling after prolonged walking and standing. She denies calf claudication, rest pain or numbness. She has used compression therapy and leg elevation without significant improvement in her symptoms.  She underwent endovenous ablation of the right greater saphenous vein 5/10/2019.  She had significant  improvement but recently has had recurrent swelling of the right lower extremity.    She presents today for endovenous ablation of the right lesser saphenous vein.          Past Medical History:   Diagnosis Date   • Diabetes mellitus (CMS/HCC) 8/16/2019   • Hypercholesterolemia 8/16/2019       Past Surgical History:   Procedure Laterality Date   • Us vasc guided endovenous radiofrequency ablation Right 05/10/2019    RT GSV RFA       Social History     Socioeconomic History   • Marital status: /Civil Union     Spouse name: Not on file   • Number of children: Not on file   • Years of education: Not on file   • Highest education level: Not on file   Occupational History   • Not on file   Tobacco Use   • Smoking status: Current Every Day Smoker     Packs/day: 0.50   • Smokeless tobacco: Never Used   Substance and Sexual Activity   • Alcohol use: Not Currently   • Drug use: Never   • Sexual activity: Not on file   Other Topics Concern   • Not on file   Social History Narrative   • Not on file     Social Determinants of Health     Financial Resource Strain:    • Social Determinants: Financial Resource Strain:    Food Insecurity:    • Social Determinants: Food Insecurity:    Transportation Needs:    • Lack of Transportation (Medical):    • Lack of Transportation (Non-Medical):    Physical Activity:    • Days of Exercise per Week:    • Minutes of Exercise per Session:    Stress:    • Social Determinants: Stress:    Social Connections:    • Social Determinants: Social Connections:    Intimate Partner Violence:    • Social Determinants: Intimate Partner Violence Past Fear:    • Social Determinants: Intimate Partner Violence Current Fear:        History reviewed. No pertinent family history.    Timeout:  -Assistant and physician together verified correct patient.   -Assistant and physician together verified correct procedure.   -Physician marked correct site.  -Assistant and physician together verified completed  informed consent reviewed and accurate.   -Assistant and physician together verified pertinent /relevant medical record detail reviewed.   -Assistant and physician together verified site and procedure with either patient or family if patient could not provide confirmation.   -Assistant and physician together verified correct positioning of the patient.   -Assistant and physician together verified immediate availability of all necessary medication.   -Assistant and physician together verified immediate availability of necessary equipment.   -Assistant and physician together verified sterility or high level disinfection of instruments/equipment prior to procedure.    Procedure:    RIGHT LESSER SAPHENOUS VEIN RADIOFREQUENCY ABLATION    The patient underwent a previous venous insufficiency US which demonstrated evidence of right LSV reflux.  The patient was counseled regarding endovenous ablation of the right lesser saphenous vein.  The goal of this procedure would be to minimize venous hypertension and improve the patient's symptoms.  Risks were discussed with the patient including but not limited to bleeding, infection, numbness, scarring, skin injury, nerve injury, recanalization requiring additional treatment, recurrent varicose veins, leg ischemia, deep venous thrombosis, and pulmonary embolism.  The patient understood and wished to proceed.    The patient was taken to the procedure room and placed prone on the table. 5 mg of Valium was administered orally prior to entering the procedure room.  A time out was performed.  The right lower extremity was prepped and draped in a sterile fashion.  Duplex ultrasonography was performed to map the lesser saphenous vein from the saphenopopliteal junction to the distal calf.  The course of the lesser saphenous vein as well as the saphenopopliteal junction were marked on the patient's skin.    The patient was placed in reverse Trendelenburg and local anesthesia was instilled  in the skin overlying the access site in the calf.  Under ultrasound guidance and using Seldinger technique a micropuncture needle and wire were used to access the lesser saphenous vein.  Confirmation that the wire was intraluminal was obtained by both venous flashback with the access needle as well as visualization of the wire within the lumen on longitudinal views.  A 6 Filipino sheath was then advanced over the microwire and flushed appropriately.    The Venclose device was prepared on the back table and advanced under ultrasound guidance toward the saphenopopliteal junction.  The tip was clearly identified and placed so that it was 3 centimeters beyond the saphenopopliteal junction and within the superficial fascial plane.  The patient was then placed in Trendelenburg position and under ultrasound guidance tumescent anesthesia was infiltrated along the entire intended treatment course of the lesser saphenous vein.    The tip of the catheter was again confirmed with ultrasound imaging to be 3 cm beyond the saphenopopliteal junction and within the superficial fascial plane.  Radiofrequency ablation was then begun and treated the entire intended treatment course of the lesser saphenous vein.  A total of 2 RF cycles were utilized for 0 minutes 40 seconds for a treatment length of 10 cm.    The lesser saphenous vein was once again imaged with US which confirmed successful ablation.  The saphenopopliteal junction and popliteal vein were patent.  The catheter and sheath were removed and hemostasis obtained with manual compression.  Dermabond, Steri-Strips, 4 x 4's, Kerlix, and Ace bandages were applied.      The patient tolerated the procedure well and was discharged home.  Discharge instructions were reviewed with the patient and family, as were the time and date for follow-up ultrasound and office visit.  The patient was instructed to call with any questions prior to the next visit.            Ky Gao,  39w2d MD  7/16/2021  11:30 AM           Discharged

## 2021-07-20 DIAGNOSIS — A41.9 SEPSIS, UNSPECIFIED ORGANISM: ICD-10-CM

## 2021-07-20 DIAGNOSIS — G43.909 MIGRAINE, UNSPECIFIED, NOT INTRACTABLE, WITHOUT STATUS MIGRAINOSUS: ICD-10-CM

## 2021-07-20 DIAGNOSIS — E11.65 TYPE 2 DIABETES MELLITUS WITH HYPERGLYCEMIA: ICD-10-CM

## 2021-07-20 DIAGNOSIS — Z91.041 RADIOGRAPHIC DYE ALLERGY STATUS: ICD-10-CM

## 2021-07-20 DIAGNOSIS — N12 TUBULO-INTERSTITIAL NEPHRITIS, NOT SPECIFIED AS ACUTE OR CHRONIC: ICD-10-CM

## 2021-07-20 DIAGNOSIS — Z88.0 ALLERGY STATUS TO PENICILLIN: ICD-10-CM

## 2021-07-20 DIAGNOSIS — Z79.4 LONG TERM (CURRENT) USE OF INSULIN: ICD-10-CM

## 2021-07-20 DIAGNOSIS — B96.20 UNSPECIFIED ESCHERICHIA COLI [E. COLI] AS THE CAUSE OF DISEASES CLASSIFIED ELSEWHERE: ICD-10-CM

## 2021-07-20 DIAGNOSIS — E78.5 HYPERLIPIDEMIA, UNSPECIFIED: ICD-10-CM

## 2021-07-20 DIAGNOSIS — Z91.013 ALLERGY TO SEAFOOD: ICD-10-CM

## 2021-07-20 DIAGNOSIS — B37.3 CANDIDIASIS OF VULVA AND VAGINA: ICD-10-CM

## 2021-07-20 DIAGNOSIS — I10 ESSENTIAL (PRIMARY) HYPERTENSION: ICD-10-CM

## 2021-07-20 DIAGNOSIS — K46.9 UNSPECIFIED ABDOMINAL HERNIA WITHOUT OBSTRUCTION OR GANGRENE: ICD-10-CM

## 2021-07-20 DIAGNOSIS — R65.20 SEVERE SEPSIS WITHOUT SEPTIC SHOCK: ICD-10-CM

## 2021-07-20 DIAGNOSIS — Z98.890 OTHER SPECIFIED POSTPROCEDURAL STATES: ICD-10-CM

## 2021-07-20 DIAGNOSIS — G47.33 OBSTRUCTIVE SLEEP APNEA (ADULT) (PEDIATRIC): ICD-10-CM

## 2021-07-20 DIAGNOSIS — E66.01 MORBID (SEVERE) OBESITY DUE TO EXCESS CALORIES: ICD-10-CM

## 2021-07-20 DIAGNOSIS — Z88.2 ALLERGY STATUS TO SULFONAMIDES: ICD-10-CM

## 2021-07-20 DIAGNOSIS — Z83.3 FAMILY HISTORY OF DIABETES MELLITUS: ICD-10-CM

## 2021-07-26 ENCOUNTER — TRANSCRIPTION ENCOUNTER (OUTPATIENT)
Age: 41
End: 2021-07-26

## 2021-07-29 ENCOUNTER — APPOINTMENT (OUTPATIENT)
Dept: ENDOCRINOLOGY | Facility: CLINIC | Age: 41
End: 2021-07-29
Payer: MEDICAID

## 2021-07-29 VITALS
BODY MASS INDEX: 47.09 KG/M2 | HEART RATE: 115 BPM | WEIGHT: 293 LBS | DIASTOLIC BLOOD PRESSURE: 83 MMHG | HEIGHT: 66 IN | OXYGEN SATURATION: 97 % | SYSTOLIC BLOOD PRESSURE: 147 MMHG | TEMPERATURE: 97.3 F

## 2021-07-29 LAB — HBA1C MFR BLD HPLC: 11

## 2021-07-29 PROCEDURE — 83036 HEMOGLOBIN GLYCOSYLATED A1C: CPT | Mod: QW

## 2021-07-29 PROCEDURE — 99214 OFFICE O/P EST MOD 30 MIN: CPT | Mod: 25

## 2021-07-29 NOTE — REVIEW OF SYSTEMS
[Fatigue] : fatigue [Stress] : stress [Visual Field Defect] : no visual field defect [Dysphagia] : no dysphagia [Dysphonia] : no dysphonia [Chest Pain] : no chest pain [Palpitations] : no palpitations [Shortness Of Breath] : no shortness of breath [Nausea] : no nausea [Constipation] : no constipation [Vomiting] : no vomiting [Headaches] : no headaches [Depression] : no depression [Cold Intolerance] : no cold intolerance

## 2021-07-29 NOTE — ASSESSMENT
[Diabetes Foot Care] : diabetes foot care [Long Term Vascular Complications] : long term vascular complications of diabetes [Carbohydrate Consistent Diet] : carbohydrate consistent diet [Importance of Diet and Exercise] : importance of diet and exercise to improve glycemic control, achieve weight loss and improve cardiovascular health [Exercise/Effect on Glucose] : exercise/effect on glucose [Hypoglycemia Management] : hypoglycemia management [Action and use of Insulin] : action and use of short and long-acting insulin [Self Monitoring of Blood Glucose] : self monitoring of blood glucose [Injection Technique, Storage, Sharps Disposal] : injection technique, storage, and sharps disposal [Retinopathy Screening] : Patient was referred to ophthalmology for retinopathy screening [Weight Loss] : weight loss [FreeTextEntry1] : Patient is a 42 yo woman with uncontrolled type 2 diabetes, class III obese, HTN here for follow up.\par \par 1. Uncontrolled T2DM, A1c of 11%\par -patient states adherence to u500 190 TID with admelog as needed for hyperglycemic episodes\par -interval events include a kidney infection, breast biopsy/cancer and what she believes to be chronic infection from her hernia\par -increase u500 to 200 TID with meals, ademlog 40 with meals if needed\par -patient to continue with SMBG, does not want CGM\par -again patient would like bariatric surgery as she has infections, sleep disturbances/newly diagnosed KARY and elevated BP. The risks and benefits of metabolic surgery can be discussed with surgeon. Follow up has been scheduled\par -continue with consistent carbohydrate diet\par -continue metformin\par -we have given her several cortisol samples to rule out Cushing's but she has lost them and has not completed testing on multiple occasions. New salivary kits were provided today\par \par 2. Class III obese\par -patient to follow up with surgery to discuss next steps\par -obtaining goal A1c may not happen in a realistic time-frame due to severe insulin resistance\par -she is on high doses of u500 with minimal improvement\par -it may be that the metabolic surgery is needed in order to improve insulin sensitivity \par \par 3. HTN\par -BP goal < 140/90\par \par Follow up in 5 months. Call with hypo/hyperglycemic excursions\par

## 2021-07-29 NOTE — PHYSICAL EXAM
[Alert] : alert [Well Nourished] : well nourished [No Acute Distress] : no acute distress [EOMI] : extra ocular movement intact [Normal Hearing] : hearing was normal [No Respiratory Distress] : no respiratory distress [No Accessory Muscle Use] : no accessory muscle use [Clear to Auscultation] : lungs were clear to auscultation bilaterally [Normal S1, S2] : normal S1 and S2 [Normal Rate] : heart rate was normal [Normal Bowel Sounds] : normal bowel sounds [Not Tender] : non-tender [Soft] : abdomen soft [Normal Gait] : normal gait [Normal Strength/Tone] : muscle strength and tone were normal [No Motor Deficits] : the motor exam was normal [Normal Affect] : the affect was normal [Normal Insight/Judgement] : insight and judgment were intact [Normal Mood] : the mood was normal [Foot Ulcers] : no foot ulcers [de-identified] : BMI 50

## 2021-07-29 NOTE — HISTORY OF PRESENT ILLNESS
[FreeTextEntry1] : Patient is a 42 yo woman with uncontrolled type 2 diabetes, HTN and HLD here for diabetes follow up\par \par Type 2 diabetes diagnosed in 2005 based on blood work. In 2007, she was started metformin 1000 mg BID. In 2009, patient was pregnant and started on glyburide- did not require insulin for pregnancy. States that when she lost weight, diabetes got worse.\par Check sugars 3-4 times a day\par When pain is controlled, sugars are improved\par AM: 180-90; has seen values of 120's recently\par U500 190-200 TID and admelog 40-50 for correction when sugars are running high. Takes a "little bit because high sugars scare me."\par Never had weight loss surgery but waiting to obtain gastric surgery\par July 3, 2021 she had pain and went to the hospital. She was told there was a kidney infection E. Coli. She has an appointment with ID Dr. Brandon coming up\par She had recent biopsy of breast nodules and she is having a lot of pain. Two titanium clips were left inside and results are pending. There may be plans for a lumpectomy but due to her infection, surgical plans are tentative.  This morning, surgeon called her and she has follow up appointment.  Patient is going to follow up with surgeon to discuss hernia/mash infection symptoms.\par States she is scared to eat because of her diabetes, lowest value was 90s. States she took u500 but didn't it.  \par Dilated eye exam: has an appointment; left eye retinopathy\par She was given cortisol testing kits on multiple visits but has not done it.

## 2021-07-30 NOTE — ED PROVIDER NOTE - CROS ED EYES ALL NEG
04/17/19 3514   Encounter Summary   Services provided to: Patient   Referral/Consult From: [address] Family members   [facility] not currently attending   Continue Visiting Yes  (4/17 continue support)   Complexity of Encounter Low   Length of Encounter 15 minutes   Routine   Type Initial   Assessment Approachable   Intervention Active listening;Explored feelings, thoughts, concerns;Explored coping resources;Nurtured hope;Prayer   Outcome Expressed gratitude;Coping   Spiritual/Temple   Type Spiritual support     Lights were turned down and pt was watching TV. Pt invited me to come in. She shared that she has had this cough for and while. Pt had problems with talking because of cough. Prayed for healing and peace. Spiritual Care will continue to provide support to pt. Called pt, LVM   negative...

## 2021-08-05 ENCOUNTER — APPOINTMENT (OUTPATIENT)
Dept: INFECTIOUS DISEASE | Facility: CLINIC | Age: 41
End: 2021-08-05
Payer: MEDICAID

## 2021-08-05 VITALS
HEIGHT: 66 IN | SYSTOLIC BLOOD PRESSURE: 148 MMHG | TEMPERATURE: 97.3 F | DIASTOLIC BLOOD PRESSURE: 65 MMHG | WEIGHT: 293 LBS | HEART RATE: 115 BPM | OXYGEN SATURATION: 97 % | BODY MASS INDEX: 47.09 KG/M2

## 2021-08-05 DIAGNOSIS — B37.3 CANDIDIASIS OF VULVA AND VAGINA: ICD-10-CM

## 2021-08-05 DIAGNOSIS — Z71.89 OTHER SPECIFIED COUNSELING: ICD-10-CM

## 2021-08-05 PROCEDURE — 99214 OFFICE O/P EST MOD 30 MIN: CPT

## 2021-08-05 RX ORDER — MICONAZOLE NITRATE 1200MG-2%
1200 & 2 KIT VAGINAL TWICE DAILY
Qty: 1 | Refills: 2 | Status: ACTIVE | COMMUNITY
Start: 2021-08-05 | End: 1900-01-01

## 2021-08-05 RX ORDER — FLUCONAZOLE 200 MG/1
200 TABLET ORAL DAILY
Qty: 20 | Refills: 0 | Status: COMPLETED | COMMUNITY
Start: 2020-09-30 | End: 2021-08-05

## 2021-08-05 RX ORDER — FLUCONAZOLE 200 MG/1
200 TABLET ORAL DAILY
Qty: 20 | Refills: 0 | Status: ACTIVE | COMMUNITY
Start: 2021-08-05 | End: 1900-01-01

## 2021-08-05 RX ORDER — FLUCONAZOLE 150 MG/1
150 TABLET ORAL DAILY
Qty: 5 | Refills: 0 | Status: COMPLETED | COMMUNITY
Start: 2020-12-03 | End: 2021-08-05

## 2021-08-05 RX ORDER — BLOOD SUGAR DIAGNOSTIC
STRIP MISCELLANEOUS
Qty: 1 | Refills: 0 | Status: COMPLETED | COMMUNITY
Start: 2020-11-06 | End: 2021-08-05

## 2021-08-05 RX ORDER — LANCETS 33 GAUGE
EACH MISCELLANEOUS
Qty: 2 | Refills: 3 | Status: COMPLETED | COMMUNITY
Start: 2020-11-06 | End: 2021-08-05

## 2021-08-05 RX ORDER — NYSTATIN 100MM UNIT
POWDER (EA) MISCELLANEOUS
Qty: 1 | Refills: 3 | Status: COMPLETED | COMMUNITY
Start: 2021-08-05 | End: 2021-08-05

## 2021-08-05 RX ORDER — DICLOFENAC SODIUM 75 MG/1
75 TABLET, DELAYED RELEASE ORAL DAILY
Qty: 30 | Refills: 0 | Status: COMPLETED | COMMUNITY
Start: 2020-11-18 | End: 2021-08-05

## 2021-08-05 RX ORDER — METRONIDAZOLE 500 MG/1
500 TABLET ORAL 3 TIMES DAILY
Qty: 42 | Refills: 0 | Status: COMPLETED | COMMUNITY
Start: 2020-12-03 | End: 2021-08-05

## 2021-08-05 RX ORDER — LABETALOL HYDROCHLORIDE 300 MG/1
300 TABLET, FILM COATED ORAL TWICE DAILY
Qty: 120 | Refills: 0 | Status: COMPLETED | COMMUNITY
End: 2021-08-05

## 2021-08-06 NOTE — PHYSICAL EXAM
[General Appearance - Alert] : alert [General Appearance - In No Acute Distress] : in no acute distress [Sclera] : the sclera and conjunctiva were normal [Outer Ear] : the ears and nose were normal in appearance [Neck Appearance] : the appearance of the neck was normal [] : no respiratory distress [Auscultation Breath Sounds / Voice Sounds] : lungs were clear to auscultation bilaterally [Heart Rate And Rhythm] : heart rate was normal and rhythm regular [Heart Sounds] : normal S1 and S2 [Bowel Sounds] : normal bowel sounds [Abdomen Soft] : soft [Abdomen Tenderness] : non-tender [FreeTextEntry1] : per  exam  [No Focal Deficits] : no focal deficits

## 2021-08-06 NOTE — HISTORY OF PRESENT ILLNESS
[FreeTextEntry1] : 41F h/o uncontrolled T2DM (A1C 12%), morbid obesity, recurrent vaginal candidiasis p/w evaluation of vaginal candidiasis.\par \par She was admitted from 7/2-7/8 for E.coli pyelonephritis.  She was given cefopoxime x 10 day course and was discharged home.  Soon after, she started to have erythema at groin and vaginal area.  She is trying nystatin cream but unable to apply well due to groin hair.  She reports white discharge coming from vagina. \par \par Previously weekly fluconazole didn't help, and fluconazole 400mg PO daily x 10 days worked well (prescribe by Dr. Kauffman, last seen 9/30/20).  She was last seen by GYN about a month ago, and has appointment next week.  She also thinks Monistat worked well too.\par \par She c/o difficulty controlling her glucose, blaming that it is due to her infeciton.

## 2021-08-06 NOTE — ASSESSMENT
[FreeTextEntry1] : 41F h/o uncontrolled T2DM (A1C 12%), morbid obesity, recurrent vaginal candidiasis p/w evaluation of vaginal candidiasis.\par \par Likely triggered by recent antibiotics usage for UTI and uncontrolled T2DM.  Importance of gycemic control was discussed.  \par \par - Fluconazole 400mg PO daily x 10 days\par - Monistat daily x 7 days\par - Nystatin powder\par - RTC prn

## 2021-08-16 ENCOUNTER — APPOINTMENT (OUTPATIENT)
Dept: NEUROLOGY | Facility: CLINIC | Age: 41
End: 2021-08-16

## 2021-09-09 NOTE — ED PROCEDURE NOTE - CPROC ED INFORMED CONSENT1
Cardiology Benefits, risks, and possible complications of procedure explained to patient/caregiver who verbalized understanding and gave verbal consent.

## 2021-09-21 ENCOUNTER — APPOINTMENT (OUTPATIENT)
Dept: BARIATRICS | Facility: CLINIC | Age: 41
End: 2021-09-21

## 2021-10-04 NOTE — ED PROVIDER NOTE - RESPIRATORY NEGATIVE STATEMENT, MLM
[address] [facility] Physical Therapy  Phone: [phone]   Fax: [phone]    Physical Therapy Daily Treatment Note  Date:  10/4/2021    Patient Name:  Sandie Paz    :  1970  MRN: [id number]  Medical/Treatment Diagnosis Information:  Diagnosis: M17.11 (ICD-10-CM) - Primary osteoarthritis of right knee / PREHAB - sx: 21  Treatment Diagnosis: decreased RLE strength, knee pain, antalgic gait, knee instability  Insurance/Certification information:  PT Insurance Information: [address] [place] [address] - $25 copay - visits PMN  Physician Information:  Referring Practitioner: Sheyla Charlton MD  Plan of care signed (Y/N): [x]  Yes []  No     Date of Patient follow up with Physician: 21     Progress Report: []  Yes  [x]  No     Date Range for reporting period:  Beginnin21   PN: 21  POC: 21  Ending: TBD     Progress report due (10 Rx/or 30 days whichever is less): DC    Recertification due (POC duration/ or 90 days whichever is less): DC    Visit # Insurance Allowable Auth required? Date Range   19 60 []  Yes  [x]  No AIM REPORTS NO AUTH NEEDED ON 21 CORESPONDENCE       Latex Allergy:  [x]NO      []YES  Preferred Language for Healthcare:   [x]English       []other:    Functional Scale:        Date assessed:  LEFS: raw score = 20/80; dysfunction = 75%  21  LEFS: raw score = 15/80; dysfunction = 81%    LEFS: raw score = 49/80; dysfunction = 39%    LEFS: raw score = 49/80; dysfunction = 39%      Pain level:  0/10 pain; 4/10 stiffness       SUBJECTIVE:  The patient was walking today and hit 3k steps just before therapy session. She reports some tightness in right knee (above patella) and swelling. She has not iced today.     OBJECTIVE:      Functional Testing   Sx Date 21 Prehab Date 21 Post-op Re-Eval Date 21 4 week F/U date 21 8 week F/U date 21       TUG (sec) 8     20  16  7   30 second sit to stand (reps) 10  8  10  13   6 minute walk (m) 449.58   264 (RW)  288 (SPC)  425. 5       Balance:     Narrowed SOFIA (sec) 10  10  10  10   Semi Tandem (sec) 10    10     10     10       Tandem (sec) 10     10     10    10      SLS (sec) 3  0  10  10       Knee AROM L R L R L R L R   Flexion 119 122  119  87  119  103  119  120   Extension hyper 1 0  0  -19  0  - 4  0 0       Knee Ext: L R L R L R L R   MMT (of 5) 4+ 4  4+  unable  NT  3  5/5  5/5   Knee Ext (#) 31.8 29.6      NT  11.6  58.9  40.9       Hip Abd:  L R L R L R L R   MMT (of 5) 4+ 4   unable  NT   NT  5 4    Hip Abd (#) 32.9 24.3      NT  NT  36.4 36.8        LEFS (raw) 20  15  27  49     9/29 - SEE POC OBJ ABOVE  9/1 - 122deg knee flexion, lack 1 deg AROM, 0deg PROM  8/30 - AAROM = 116deg flexion, 0deg ext AAROM (after manual)  8/25 - AAROM 116deg flexion; lack 3 deg AROM  8/23: AROM knee flexion 105 degrees, AAROM with heel slide 109 degrees. Pt to department today with RW. Mild tenderness noted lateral distal hamstrings and proximal lateral gastroc  8/20 - R knee AAROM flexion: 110   8/13/21  Rt knee ROM 0-100 EOB. Presented to clinic with walker but brought     8/11 - 95deg AAROM R knee flexion; lack 3 extension AROM; soft end feel for both; however significant pain. 8/7: AROM knee ext with quad set lacking 6 from neutral; Knee flexion AAROM heel slide and overpressure 97 degrees with soft end feel however significant pain.      RESTRICTIONS/PRECAUTIONS: HX OF FALLS     Exercises/Interventions:   Therapeutic Exercises (63408) Resistance / level Sets/sec Reps Notes   Standing HR/TR  IB Gastroc Squats  Quantum HS Curls (B3, K2, A4) Prone Quad Strap Stretch  9/14 - added   Standing HF Stretch  9/14 - added   Standing Quad Stretch w/ R ankle on chair   9/17 - added; PT assisted for positioning of chair    Leg Press (B1, L5) 90# 3 10 9/29 - ^ resistance across sets   Single Leg Deadlift  Quantum Knee Ext (B3, KD, A4) Alt LE SLR Abd - standing Standing Resisted Glute Kick minutes spent completing functional testing as well as educating patient on results and other instruction. 8/17 - Functional testing performed this date and compared with previous values as noted above. Testing done in order to assess progress with community navigation and fall risk. A total of 25 minutes spent completing functional testing and reviewing results. Modalities: 8/20: NMR with Croatian estim to R quads: LAQ, SAQ, quad sets. Pt instructed with verbal and tactile cueing for quad engagement B quads to facilitate activation. 10/10 on /off cycle x 10 mins. Pt. Education:   6/18 - Patient was thoroughly educated on this date regarding prehabilitation goals, importance of PT sessions in improving overall ROM, strength and stability prior to surgery, and how prehabilitation will facilitate improved post-operative outcomes. The patient was educated on and instructed in HEP as listed. The patient was given a detailed handout for exercises to initiate in the hospital post-operatively as well as at home. The discharge plan from the hospital was reviewed with the patient; specifically, to reduce length of hospital stay and to minimize time before reinitiating outpatient physical therapy after surgery. Education regarding early mobility post-operatively in the hospital and emphasis on working with both physical therapy and nursing staff to achieve ambulation goal of 150 feet was provided. The patient was highly encouraged to attend joint class in hospital prior to surgery for further instructions on pre and post-surgical care. Also, the patient was educated on precautions after hip replacement to avoid, specifically, hip extension and repetitive hip flexion.  It is in my medical opinion that this patient is clear from all physical barriers prior to consideration for surgery, activity modifications prior to and post operatively have been discussed with this patient as well as discharge planning and is cleared for surgery from physical therapy perspective.  -patient educated on diagnosis, prognosis and expectations for rehab  -all patient questions were answered    Home Exercise Program:  Access Code: [email or web address]  - [employer] Leg Machines: Leg press, HS Curls, Knee Ext, Hip Abd, Hip Add (settings still needed)    Access Code: [email or web address]  URL: [email or web address]  Date: 08/20/2021  Prepared by: Miguelina Lynn    Exercises  Seated Hamstring Stretch - 2-3 x daily - 6 x weekly - 4 reps - 20 hold  Mini Squat - 2-3 x daily - 6 x weekly - 2 sets - 10 reps  Prone Quadriceps Set - 2-3 x daily - 6 x weekly - 1 sets - 10 reps - 5-8 hold  Supine Active Straight Leg Raise - 2-3 x daily - 6 x weekly - 3 sets - 10 reps  Gastroc Stretch on Wall - 2-3 x daily - 6 x weekly - 1 sets - 3 reps - 10 hold  Supine Heel Slide with Strap - 2-3 x daily - 6 x weekly - 1 sets - 10 reps - 10 hold  Supine Knee Extension Mobilization with Weight - 2-3 x daily - 6 x weekly - 1 sets - 3 reps - 60 hold  Seated Long Arc Quad - 2-3 x daily - 6 x weekly - 2 sets - 10 reps - 3 hold    8/17 - added 1/4 squat    Access Code: [email or web address]  URL: [email or web address]  Date: 08/05/2021  Prepared by: Prema Schneider    Exercises   Long Sitting Calf Stretch with Strap - 3 x daily - 7 x weekly - 4 reps - 20 hold  Seated Gastroc Stretch with Strap - 3 x daily - 7 x weekly - 4 reps - 20 hold   Seated Hamstring Stretch - 3 x daily - 7 x weekly - 4 reps - 20 hold     8/5: HEP was also reviewed with pt from home health PT and encouraged to continue, additions of interventions above. 6/18 - The following exercises were discussed and added to the patient's home exercise program. (glute set, quad set, ankle pumps, heel prop, heel slide, SLR flexion). Additionally, the patient was educated on appropriate frequency, intensity and duration. Handout provided.     Therapeutic Exercise and NMR EXR  [x] (35174) Provided verbal/tactile cueing for activities related to strengthening, flexibility, endurance, ROM for improvements in  [x] LE / Lumbar: LE, proximal hip, and core control with self care, mobility, lifting, ambulation. [] UE / Cervical: cervical, postural, scapular, scapulothoracic and UE control with self care, reaching, carrying, lifting, house/yardwork, driving, computer work.  [] (05667) Provided verbal/tactile cueing for activities related to improving balance, coordination, kinesthetic sense, posture, motor skill, proprioception to assist with   [] LE / lumbar: LE, proximal hip, and core control in self care, mobility, lifting, ambulation and eccentric single leg control. [] UE / cervical: cervical, scapular, scapulothoracic and UE control with self care, reaching, carrying, lifting, house/yardwork, driving, computer work.   [] (27477) Therapist is in constant attendance of 2 or more patients providing skilled therapy interventions, but not providing any significant amount of measurable one-on-one time to either patient, for improvements in  [] LE / lumbar: LE, proximal hip, and core control in self care, mobility, lifting, ambulation and eccentric single leg control. [] UE / cervical: cervical, scapular, scapulothoracic and UE control with self care, reaching, carrying, lifting, house/yardwork, driving, computer work.      NMR and Therapeutic Activities:    [x] (83562 or 17619) Provided verbal/tactile cueing for activities related to improving balance, coordination, kinesthetic sense, posture, motor skill, proprioception and motor activation to allow for proper function of   [x] LE: / Lumbar core, proximal hip and LE with self care and ADLs  [] UE / Cervical: cervical, postural, scapular, scapulothoracic and UE control with self care, carrying, lifting, driving, computer work.   [] (39343) Gait Re-education- Provided training and instruction to the patient for proper LE, core and proximal hip recruitment and positioning and eccentric body weight control with ambulation re-education including up and down stairs     Home Management Training / Self Care:  [] (45138) Provided self-care/home management training related to activities of daily living and compensatory training, and/or use of adaptive equipment for improvement with: ADLs and compensatory training, meal preparation, safety procedures and instruction in use of adaptive equipment, including bathing, grooming, dressing, personal hygiene, basic household cleaning and chores. Home Exercise Program:    [x] (34270) Reviewed/Progressed HEP activities related to strengthening, flexibility, endurance, ROM of   [x] LE / Lumbar: core, proximal hip and LE for functional self-care, mobility, lifting and ambulation/stair navigation   [] UE / Cervical: cervical, postural, scapular, scapulothoracic and UE control with self care, reaching, carrying, lifting, house/yardwork, driving, computer work  [] (06424)Reviewed/Progressed HEP activities related to improving balance, coordination, kinesthetic sense, posture, motor skill, proprioception of   [] LE: core, proximal hip and LE for self care, mobility, lifting, and ambulation/stair navigation    [] UE / Cervical: cervical, postural,  scapular, scapulothoracic and UE control with self care, reaching, carrying, lifting, house/yardwork, driving, computer work    Manual Treatments:  PROM / STM / Oscillations-Mobs:  G-I, II, III, IV (PA's, Inf., Post.)  [] (16840) Provided manual therapy to mobilize LE, proximal hip and/or LS spine soft tissue/joints for the purpose of modulating pain, promoting relaxation,  increasing ROM, reducing/eliminating soft tissue swelling/inflammation/restriction, improving soft tissue extensibility and allowing for proper ROM for normal function with   [] LE / lumbar: self care, mobility, lifting and ambulation. [] UE / Cervical: self care, reaching, carrying, lifting, house/yardwork, driving, computer work.      Modalities:  [] (17983) Vasopneumatic compression: Utilized vasopneumatic compression to decrease edema / swelling for the purpose of improving mobility and quad tone / recruitment which will allow for increased overall function including but not limited to self-care, transfers, ambulation, and ascending / descending stairs. Charges:  Timed Code Treatment Minutes: 40   Total Treatment Minutes: 40     [] EVAL - LOW (77732)   [] EVAL - MOD (29444)  [] EVAL - HIGH (85627)  [] RE-EVAL (11664)  [x] CF(26551) x 2         [] Ionto  [] NMR (53917) x         [] Vaso  [] Manual (96446) x        [] Ultrasound  [x] TA x 1       [] Mech Traction (70405)  [] Aquatic Therapy x      [] ES (un) (18418):   [] Home Management Training x  [] ES(attended) (59506)   [] Dry Needling 1-2 muscles (93676):  [] Dry Needling 3+ muscles (786036)  [] Group:      [] Other: phys perf x 2     GOALS:  Patient stated goal: The patient will be able to walk >500ft without increase in symptoms. []? Progressing: [x]? Met: []? Not Met: []? Adjusted     Therapist goals for Patient:   Short Term Goals: To be achieved in: 2 weeks post-op  1. Independent in HEP and progression per patient tolerance without increase in symptoms. []? Progressing: [x]? Met: []? Not Met: []? Adjusted  2. Patient will have a decrease in pain to <5/10 to facilitate improvement in movement of the BLE, and ADLs as indicated by Functional Deficits. []? Progressing: [x]? Met: []? Not Met: []? Adjusted     Long Term Goals: To be achieved in: 12 weeks post-op  1. Disability index score of 35% or less for the LEFS to assist with reaching prior level of function. [x]? Progressing: []? Met: [x]? Not Met: []? Adjusted  2. Patient will demonstrate increased knee AROM from 0 -120 allow for proper joint functioning as indicated by patients Functional Deficits. []? Progressing: [x]? Met: []? Not Met: []? Adjusted   3.  Patient will demonstrate an increase in Strength to at least 4+/5 with R knee flexion and extension in order perform proper functional activities. []? Progressing: [x]? Met: []? Not Met: [x]? Adjusted: 5/5  4. Patient will return to functional activities including walking in grocery strore without increased symptoms or restriction. [x]? Progressing: []? Met: [x]? Not Met: []? Adjusted  5. Patient will be able to walk >2 blocks without increase in pain <3/10. []? Progressing: [x]? Met: []? Not Met: []? Adjusted         Overall Progression Towards Functional goals/ Treatment Progress Update:  [x] Patient is progressing as expected towards functional goals listed. [] Progression is slowed due to complexities/Impairments listed. [] Progression has been slowed due to co-morbidities. [] Plan just implemented, too soon to assess goals progression <30days   [] Goals require adjustment due to lack of progress  [] Patient is not progressing as expected and requires additional follow up with physician  [] Other    Persisting Functional Limitations/Impairments:  []Sleeping []Sitting               [x]Standing [x]Transfers        [x]Walking [x]Kneeling               [x]Stairs []Squatting / bending   []ADLs []Reaching  []Lifting  []Housework  [x]Driving []Job related tasks  []Sports/Recreation [x]Other: donning R shoe      ASSESSMENT: The patient was able to progress to [employer] leg machine routine this date and was shown the layout of the gym, as well as location of machines, settings, weights. The patient was able to progress resistance across leg machines without pain. The patient was approved to continue strengthening and endurance in PT, or may transition to independent HEP once familiar with [employer].      Treatment/Activity Tolerance:  [x] Patient able to complete tx  [] Patient limited by fatigue  [] Patient limited by pain  [] Patient limited by other medical complications  [] Other:     Prognosis: [x] Good [] Fair  [] Poor    Patient Requires Follow-up: [x] Yes  [] No      Plan for next treatment session: quad strength, strengthening in [place]; squats as tolerated. Step activity & eccentrics. [employer] ROUTINE FOR LEG MACHINES. PLAN:  PT 2-3x / week for 12 weeks. [x] Continue per plan of care [] Alter current plan (see comments)   [] Plan of care initiated [] Hold pending MD visit [] Discharge    Electronically signed by: Igor Peters, PT, DPT, OMT-C    Note: If patient does not return for scheduled/ recommended follow up visits, this note will serve as a discharge from care along with most recent update on progress. no chest pain, no cough, and no shortness of breath.

## 2021-11-04 NOTE — ED ADULT NURSE NOTE - DOES PATIENT HAVE ADVANCE DIRECTIVE
No Xolair Pregnancy And Lactation Text: This medication is Pregnancy Category B and is considered safe during pregnancy. This medication is excreted in breast milk.

## 2021-11-17 NOTE — DISCHARGE NOTE OB - IF BREASTFEEDING, ADD A TOTAL OF 500 EXTRA CALORIES EACH DAY
Addended by: AJKE HUMPHREY on: 11/17/2021 09:29 AM     Modules accepted: Orders     Statement Selected

## 2021-12-01 PROCEDURE — G9005: CPT

## 2021-12-23 ENCOUNTER — APPOINTMENT (OUTPATIENT)
Dept: ENDOCRINOLOGY | Facility: CLINIC | Age: 41
End: 2021-12-23

## 2021-12-23 DIAGNOSIS — E66.01 MORBID (SEVERE) OBESITY DUE TO EXCESS CALORIES: ICD-10-CM

## 2021-12-23 DIAGNOSIS — E78.00 PURE HYPERCHOLESTEROLEMIA, UNSPECIFIED: ICD-10-CM

## 2021-12-23 NOTE — PROGRESS NOTE ADULT - REASON FOR ADMISSION
Medical Assistant Note:  Wanda LOPEZ Sarkis presents today for lab draw.    Patient seen by provider today: No.   present during visit today: Not Applicable.    Concerns: No Concerns.    Procedure:  Lab draw site: LAC, Needle type: BF, Gauge: 21. Gauze and coban applied    Post Assessment:  Labs drawn without difficulty: Yes.    Discharge Plan:  Departure Mode: Ambulatory.    Face to Face Time: 5.    Iva Mathias CMA            
Nausea, Vomiting, Chills

## 2021-12-23 NOTE — HISTORY OF PRESENT ILLNESS
[FreeTextEntry1] : Patient is a 42 yo woman with uncontrolled type 2 diabetes, class III obesity, HTN and HLD here for diabetes follow up\par \par Type 2 diabetes diagnosed in 2005 based on blood work. In 2007, she was started metformin 1000 mg BID. In 2009, patient was pregnant and started on glyburide- did not require insulin for pregnancy. States that when she lost weight, diabetes got worse.\par Check sugars 3-4 times a day\par When pain is controlled, sugars are improved\par AM: 180-90; has seen values of 120's recently\par U500 200 TID and admelog 40-50 for correction when sugars are running high.\par Never had weight loss surgery but waiting to obtain gastric surgery\par Dilated eye exam: has an appointment; left eye retinopathy\par She was given cortisol testing kits on multiple visits but has not done it.

## 2022-02-25 NOTE — DISCHARGE NOTE PROVIDER - NSDCCPTREATMENT_GEN_ALL_CORE_FT
[facility] PRINCIPAL PROCEDURE  Procedure: CT pelvis  Findings and Treatment: 3 cm area of infiltration of subcutaneous fat in the inferior left buttock consistent with superficial phlegmonous change or perhaps early abscess, probably related to the dermal appendages. Does not appear to be drainable at this time. No fistulous connection to the anal canal.

## 2022-03-18 NOTE — PROGRESS NOTE ADULT - PROBLEM SELECTOR PROBLEM 9
Nutrition, metabolism, and development symptoms
8/2021
Nutrition, metabolism, and development symptoms
Nutrition, metabolism, and development symptoms

## 2022-04-11 PROBLEM — Z11.59 SCREENING FOR VIRAL DISEASE: Status: ACTIVE | Noted: 2021-05-13

## 2022-07-06 NOTE — PROGRESS NOTE ADULT - PROVIDER SPECIALTY LIST ADULT
Endocrinology pt in bed asking to get to bsc, she is irritable, was placed in a chair for
breakfast and is upset about that because of her pain, but wont tell this
nurse where it is, goes on about having it for 20yrs, offered heating pad, she
acts discusted with every suggestion, she is allowed norco tid, was given one
at 0400, she states she shouldn't have had one then, needs it now for
breakfast, and insists that she wont eat until she recieves one, spoke with
Dr. Garcia and obtained a one time extra dose to give her, she refuses to take
her other meds until she gets breakfast, offered to lay her down, nothing is
acceptable at this time. gave her dose of norco, and will wait until she feels
better to try the rest of her medications. call light in reach.

## 2022-07-25 ENCOUNTER — NON-APPOINTMENT (OUTPATIENT)
Age: 42
End: 2022-07-25

## 2022-08-01 NOTE — ED ADULT NURSE NOTE - NS ED NURSE LEVEL OF CONSCIOUSNESS AFFECT
Appropriate/Calm Consent: Written consent was obtained and risks were reviewed including but not limited to scarring, infection, bleeding, scabbing, incomplete removal, nerve damage and allergy to anesthesia.

## 2022-09-09 ENCOUNTER — NON-APPOINTMENT (OUTPATIENT)
Age: 42
End: 2022-09-09

## 2022-09-13 ENCOUNTER — APPOINTMENT (OUTPATIENT)
Dept: BREAST CENTER | Facility: CLINIC | Age: 42
End: 2022-09-13

## 2022-10-12 NOTE — BEHAVIORAL HEALTH ASSESSMENT NOTE - NSBHCONSULTMEDS_PSY_A_CORE FT
Opened telephone encounter to resolve quit #1 episode. Pt is .  
1. Trial trazodone 25 mg PO HS prn for insomnia

## 2022-12-20 NOTE — PATIENT PROFILE ADULT - FUNCTIONAL SCREEN CURRENT LEVEL: COMMUNICATION, MLM
PAST MEDICAL HISTORY:  Essential hypertension     Hyperlipidemia     Type 2 diabetes mellitus      0 = understands/communicates without difficulty

## 2023-01-04 ENCOUNTER — APPOINTMENT (OUTPATIENT)
Dept: ENDOCRINOLOGY | Facility: CLINIC | Age: 43
End: 2023-01-04
Payer: MEDICAID

## 2023-01-04 VITALS
HEIGHT: 66 IN | BODY MASS INDEX: 35.84 KG/M2 | TEMPERATURE: 97 F | WEIGHT: 223 LBS | SYSTOLIC BLOOD PRESSURE: 147 MMHG | DIASTOLIC BLOOD PRESSURE: 114 MMHG | HEART RATE: 110 BPM | OXYGEN SATURATION: 97 %

## 2023-01-04 DIAGNOSIS — I10 ESSENTIAL (PRIMARY) HYPERTENSION: ICD-10-CM

## 2023-01-04 DIAGNOSIS — I63.9 CEREBRAL INFARCTION, UNSPECIFIED: ICD-10-CM

## 2023-01-04 LAB — HBA1C MFR BLD HPLC: 8.3

## 2023-01-04 PROCEDURE — 99215 OFFICE O/P EST HI 40 MIN: CPT | Mod: 25

## 2023-01-04 PROCEDURE — 83036 HEMOGLOBIN GLYCOSYLATED A1C: CPT | Mod: QW

## 2023-01-04 RX ORDER — LANCETS
EACH MISCELLANEOUS
Qty: 2 | Refills: 3 | Status: ACTIVE | COMMUNITY
Start: 2023-01-04 | End: 1900-01-01

## 2023-01-04 RX ORDER — PEN NEEDLE, DIABETIC 32GX 5/32"
32G X 4 MM NEEDLE, DISPOSABLE MISCELLANEOUS
Qty: 3 | Refills: 3 | Status: ACTIVE | COMMUNITY
Start: 2023-01-04 | End: 1900-01-01

## 2023-01-04 NOTE — HISTORY OF PRESENT ILLNESS
[FreeTextEntry1] : Patient is a 41 yo woman with uncontrolled type 2 diabetes, HTN and HLD here for diabetes follow up. Patient's last visit was July 2021. Arrives 40 minutes late today\par \par Type 2 diabetes diagnosed in 2005 based on blood work. In 2007, she was started metformin 1000 mg BID. In 2009, patient was pregnant and started on glyburide- did not require insulin for pregnancy. States that when she lost weight, diabetes got worse.\par Check sugars 3-4 times a day\par Patient has had multiple infections and hospitalizations.  Patient was admitted to the hospital since last year, then discharged to rehab.  She has had a long complicated journey with infections and sepsis.  Recently admitted and discharged five days ago.  Reports that at some point she had a stroke and the sepsis required several surgeries.  She was in the ICU as well.  Patient has had colectomy and also doing suprapubic catheter\par She established endocrine care here October 2020. Over time, we changed insulin regimen to U500\par In the interval she has was found to have a breast lump and is high risk for cancer.  She was supposed to have surgery but it has been delayed\par Because she was admitted to the hospital she was on lantus and humalog.  Patient just picked up insulin yesterday. Her discharge instructions states Lantus 18 and Humalog 6 TID with meals. Metformin was started but it's causing diarrhea\par AM: 180-90; has seen values of 120's recently\par When in pain, glucose goes up.  If she doesn't eat, glucose goes up to 300-400s.  On better days values are in the 200s.  \par Dilated eye exam: has an appointment; left eye retinopathy\par

## 2023-01-04 NOTE — PHYSICAL EXAM
[Alert] : alert [Well Nourished] : well nourished [No Acute Distress] : no acute distress [EOMI] : extra ocular movement intact [Normal Hearing] : hearing was normal [No Respiratory Distress] : no respiratory distress [No Accessory Muscle Use] : no accessory muscle use [Clear to Auscultation] : lungs were clear to auscultation bilaterally [Normal S1, S2] : normal S1 and S2 [Normal Rate] : heart rate was normal [Normal Bowel Sounds] : normal bowel sounds [Not Tender] : non-tender [Soft] : abdomen soft [Normal Gait] : normal gait [Normal Strength/Tone] : muscle strength and tone were normal [Foot Ulcers] : no foot ulcers [Right foot was examined, including] : right foot ~C was examined, including visual inspection with sensory and pulse exams [Left foot was examined, including] : left foot ~C was examined, including visual inspection with sensory and pulse exams [2+] : 2+ in the dorsalis pedis [No Motor Deficits] : the motor exam was normal [Normal Affect] : the affect was normal [Normal Insight/Judgement] : insight and judgment were intact [Normal Mood] : the mood was normal [de-identified] : BMI 35 [de-identified] : Colostomy bag LLQ, suprapubic catheter [de-identified] : left ankle brace

## 2023-01-04 NOTE — REVIEW OF SYSTEMS
[Fatigue] : no fatigue [Recent Weight Loss (___ Lbs)] : recent weight loss: [unfilled] lbs [Dysphagia] : no dysphagia [Dysphonia] : no dysphonia [Chest Pain] : no chest pain [Slow Heart Rate] : heart rate is not slow [Palpitations] : no palpitations [Fast Heart Rate] : heart rate is not fast [Nausea] : no nausea [Constipation] : no constipation [Vomiting] : no vomiting [Diarrhea] : diarrhea [As Noted in HPI] : as noted in HPI [Depression] : no depression [Cold Intolerance] : no cold intolerance

## 2023-01-04 NOTE — ASSESSMENT
[Diabetes Foot Care] : diabetes foot care [Long Term Vascular Complications] : long term vascular complications of diabetes [Carbohydrate Consistent Diet] : carbohydrate consistent diet [Importance of Diet and Exercise] : importance of diet and exercise to improve glycemic control, achieve weight loss and improve cardiovascular health [Hypoglycemia Management] : hypoglycemia management [Action and use of Insulin] : action and use of short and long-acting insulin [Self Monitoring of Blood Glucose] : self monitoring of blood glucose [Insulin Self-Administration] : insulin self-administration [Injection Technique, Storage, Sharps Disposal] : injection technique, storage, and sharps disposal [Retinopathy Screening] : Patient was referred to ophthalmology for retinopathy screening [Weight Loss] : weight loss [FreeTextEntry1] : Patient is a 43 yo woman with uncontrolled diabetes and complicated medical course including infections/stroke, etc.  The patient also is high risk for cancer and requires treatment. She had a prolonged hospital stay and recovery at rehab.  Recent hospital visit discharged 5 days ago\par \par 1. Uncontrolled diabetes\par -the patient's POCT A1c today is 8.3% and in HIE has an A1c of 5.7% December 2021. She has been loss to endocrine follow up due to all her hospitalizations and complicated health course\par -prior to hospitalization, she was on U500 and discharged on lantus/humalog at minimal doses.  Patient reports, sugars have gone back up to the 400's especially when she is pain.  I've recommended lantus/humalog at higher doses but patient is adamant that regimen will not work for her. As such, we can start U500 at very conservative dose of 50 TID with meals.  I've asked her to take ONE dose of the u500 first and monitor glucose levels as it is high risk for hypoglycemia.  Glycemic goals were discussed\par -metformin is causing diarrhea and she's got a colostomy.  Stop the metformin at this time\par -continue healthy eating\par -she has had significant weight loss from her multiple surgeries.  The patient had vulvectomy and perianal cellulitis removed.  She is utilizing suprapubic catheter and has a colostomy bag as well\par \par 2. HTN\par -BP elevated as she has pain \par -stroke since the last visit\par -PCP to optimize BP \par \par Hypoglycemia was discussed at length.  IF patient has any symptoms, she knows to check sugars. All supplies sent to pharmacy. RPA in 1 month. Call with hypo/hyperglycemic excursions

## 2023-02-06 ENCOUNTER — APPOINTMENT (OUTPATIENT)
Dept: ENDOCRINOLOGY | Facility: CLINIC | Age: 43
End: 2023-02-06

## 2023-02-08 ENCOUNTER — APPOINTMENT (OUTPATIENT)
Dept: ENDOCRINOLOGY | Facility: CLINIC | Age: 43
End: 2023-02-08
Payer: MEDICAID

## 2023-02-08 ENCOUNTER — NON-APPOINTMENT (OUTPATIENT)
Age: 43
End: 2023-02-08

## 2023-02-08 PROCEDURE — 99214 OFFICE O/P EST MOD 30 MIN: CPT

## 2023-02-08 RX ORDER — PEN NEEDLE, DIABETIC 29 G X1/2"
32G X 4 MM NEEDLE, DISPOSABLE MISCELLANEOUS
Qty: 4 | Refills: 4 | Status: ACTIVE | COMMUNITY
Start: 2020-06-26 | End: 1900-01-01

## 2023-02-08 NOTE — REVIEW OF SYSTEMS
[Fatigue] : no fatigue [Decreased Appetite] : appetite not decreased [Dysphagia] : no dysphagia [Dysphonia] : no dysphonia [Chest Pain] : no chest pain [Palpitations] : no palpitations [Shortness Of Breath] : no shortness of breath [Nausea] : no nausea [Vomiting] : no vomiting

## 2023-02-08 NOTE — PHYSICAL EXAM
[Foot Ulcers] : no foot ulcers [de-identified] : Colostomy bag LLQ, suprapubic catheter [de-identified] : suprapubic catheter [de-identified] : left ankle brace [de-identified] : left arm PICC

## 2023-02-08 NOTE — ASSESSMENT
[FreeTextEntry1] : Patient is a 43 yo woman with uncontrolled diabetes and complicated medical course including infections/stroke, necrotizing fascitis s/p pubic surgery, colostomy, PICC and suprapubic catheterization request acute visit for high sugars. The patient also is high risk for cancer and requires treatment. She had a prolonged hospital stay and recovery at rehab. Recent hospital visit discharged 5 days ago\par \par 1. Uncontrolled diabetes\par -the patient had another interval hospitalization at [facility] January 18 requiring 9 day admission. Her records were reviewed together on phone. Serum glucose has been > 500 with no DKA.  Despite self-titrated U500 60 TID with meals, POCT glucose remains > 600 today. She is without symptoms and does not want to keep going to the hospital\par -for now, increase U500 to 100 units TID with meals.  Hypoglycemia education provided. If glucose levels do not improve, can up titrate to 125 units TID wiwth meals. In the past, she required 150 units TID.  While adhering to U500 125 TID, if glucose levels do not improve, add admelog 30 units TID with meals\par -if any symptoms, she must go to the hospital\par -stay hydrated with water\par -patient has so many comorbidities.  With all of her issues, it would be prudent for her to discuss prognosis with her other doctors\par -insulin and all supplies sent to pharmacy\par \par Return in 3 months

## 2023-02-08 NOTE — HISTORY OF PRESENT ILLNESS
[FreeTextEntry1] : Patient is a 41 yo woman with uncontrolled type 2 diabetes, HTN and HLD here for diabetes follow up. Patient's last visit was July 2021. No show to scheduled visit. Called office today stating glucose levels are in the 600s\par \par Type 2 diabetes diagnosed in 2005 based on blood work. In 2007, she was started metformin 1000 mg BID. In 2009, patient was pregnant and started on glyburide- did not require insulin for pregnancy. States that when she lost weight, diabetes got worse.\par Check sugars 3-4 times a day\par Patient has had multiple infections and hospitalizations. Patient was admitted to the hospital since last year for SUPRAPUBIC NECROTIZING FASCITIS, then discharged to rehab. She has had a long complicated journey with infections and sepsis. Recently admitted and discharged five days ago. Reports that at some point she had a stroke and the sepsis required several surgeries. She was in the ICU as well. Patient has had colectomy and also doing suprapubic catheter\par She established endocrine care here October 2020. Over time, we changed insulin regimen to U500\par In the interval she has was found to have a breast lump and is high risk for cancer. She was supposed to have surgery but it has been delayed\par Because she was admitted to the hospital she was on lantus and humalog. Patient just picked up insulin yesterday. Her discharge instructions states Lantus 18 and Humalog 6 TID with meals. Metformin was started but it's causing diarrhea\par She had her post-hospitalization visit one month ago. At that visit, we stopped basal/bolus insulin and restarted U500 50 units TID with meals. Since her last visit, she was admitted to the hospital again.  She had high colostomy output and was vomiting "diarrhea."  Patient went to [facility] and got a PICC and a suprapubic catheter.  She got 7 days worth of antibiotics and was admitted for 9 days.  Patient does not k now if she got steroids during that admission.  After being discharged, glucose levels are  600.  She self adjusted to u500 60 TID but sugars continue to be uncontrolled

## 2023-03-08 ENCOUNTER — APPOINTMENT (OUTPATIENT)
Dept: ENDOCRINOLOGY | Facility: CLINIC | Age: 43
End: 2023-03-08

## 2023-03-08 LAB — GLUCOSE BLDC GLUCOMTR-MCNC: NORMAL

## 2023-03-08 NOTE — DISCHARGE NOTE PROVIDER - CARE PROVIDER_API CALL
Shari Luna  NEUROLOGY  [address] [address]  Phone: [phone]  Fax: [phone]  Scheduled Appointment: 11/18/2020 03:00 PM    Nisha Joseph)  EndocrinologyMetabDiabetes; Internal Medicine  [address] [place] [id number]  Phone: [phone]  Fax: [phone]  Scheduled Appointment: 11/17/2020 01:00 PM  
rolling walker

## 2023-03-15 NOTE — ED ADULT NURSE NOTE - BREATH SOUNDS, MLM
NALTREXONE HCL 50MG TABS  Last Written Prescription Date:   10/24/2022  Last Fill Quantity: 30,   # refills: 3  Last Office Visit :  3/1/2022  Future Office visit:  None    Routing refill request to provider for review/approval because:  Please send new order to increase dose of 1 tab daily to pharm for Pt care.  Last order looks like it was increased over time.       Madonna Pang RN  Central Triage Red Flags/Med Refills     Clear

## 2023-03-18 VITALS
TEMPERATURE: 100 F | RESPIRATION RATE: 18 BRPM | HEART RATE: 123 BPM | OXYGEN SATURATION: 98 % | DIASTOLIC BLOOD PRESSURE: 93 MMHG | HEIGHT: 67 IN | SYSTOLIC BLOOD PRESSURE: 141 MMHG | WEIGHT: 220.02 LBS

## 2023-03-18 LAB
ALBUMIN SERPL ELPH-MCNC: 3.9 G/DL — SIGNIFICANT CHANGE UP (ref 3.3–5)
ALP SERPL-CCNC: 182 U/L — HIGH (ref 40–120)
ALT FLD-CCNC: 8 U/L — LOW (ref 10–45)
ANION GAP SERPL CALC-SCNC: 17 MMOL/L — SIGNIFICANT CHANGE UP (ref 5–17)
APPEARANCE UR: CLEAR — SIGNIFICANT CHANGE UP
APTT BLD: 30.1 SEC — SIGNIFICANT CHANGE UP (ref 27.5–35.5)
AST SERPL-CCNC: 15 U/L — SIGNIFICANT CHANGE UP (ref 10–40)
B-OH-BUTYR SERPL-SCNC: 0.2 MMOL/L — SIGNIFICANT CHANGE UP
BACTERIA # UR AUTO: PRESENT /HPF
BASE EXCESS BLDV CALC-SCNC: -2.3 MMOL/L — LOW (ref -2–3)
BASOPHILS # BLD AUTO: 0.05 K/UL — SIGNIFICANT CHANGE UP (ref 0–0.2)
BASOPHILS NFR BLD AUTO: 0.4 % — SIGNIFICANT CHANGE UP (ref 0–2)
BILIRUB SERPL-MCNC: 0.2 MG/DL — SIGNIFICANT CHANGE UP (ref 0.2–1.2)
BILIRUB UR-MCNC: NEGATIVE — SIGNIFICANT CHANGE UP
BUN SERPL-MCNC: 10 MG/DL — SIGNIFICANT CHANGE UP (ref 7–23)
CALCIUM SERPL-MCNC: 9.8 MG/DL — SIGNIFICANT CHANGE UP (ref 8.4–10.5)
CHLORIDE SERPL-SCNC: 93 MMOL/L — LOW (ref 96–108)
CO2 BLDV-SCNC: 22.4 MMOL/L — SIGNIFICANT CHANGE UP (ref 22–26)
CO2 SERPL-SCNC: 21 MMOL/L — LOW (ref 22–31)
COLOR SPEC: YELLOW — SIGNIFICANT CHANGE UP
COMMENT - URINE: SIGNIFICANT CHANGE UP
CREAT SERPL-MCNC: 0.72 MG/DL — SIGNIFICANT CHANGE UP (ref 0.5–1.3)
DIFF PNL FLD: ABNORMAL
EGFR: 106 ML/MIN/1.73M2 — SIGNIFICANT CHANGE UP
EOSINOPHIL # BLD AUTO: 0.16 K/UL — SIGNIFICANT CHANGE UP (ref 0–0.5)
EOSINOPHIL NFR BLD AUTO: 1.2 % — SIGNIFICANT CHANGE UP (ref 0–6)
EPI CELLS # UR: SIGNIFICANT CHANGE UP /HPF (ref 0–5)
FLUAV AG NPH QL: SIGNIFICANT CHANGE UP
FLUBV AG NPH QL: SIGNIFICANT CHANGE UP
GAS PNL BLDV: SIGNIFICANT CHANGE UP
GLUCOSE SERPL-MCNC: 575 MG/DL — CRITICAL HIGH (ref 70–99)
GLUCOSE UR QL: >=1000
HCG SERPL-ACNC: <0 MIU/ML — SIGNIFICANT CHANGE UP
HCO3 BLDV-SCNC: 21 MMOL/L — LOW (ref 22–29)
HCT VFR BLD CALC: 41.9 % — SIGNIFICANT CHANGE UP (ref 34.5–45)
HGB BLD-MCNC: 13.3 G/DL — SIGNIFICANT CHANGE UP (ref 11.5–15.5)
IMM GRANULOCYTES NFR BLD AUTO: 0.4 % — SIGNIFICANT CHANGE UP (ref 0–0.9)
INR BLD: 1.13 — SIGNIFICANT CHANGE UP (ref 0.88–1.16)
KETONES UR-MCNC: NEGATIVE — SIGNIFICANT CHANGE UP
LACTATE SERPL-SCNC: 5.4 MMOL/L — CRITICAL HIGH (ref 0.5–2)
LEUKOCYTE ESTERASE UR-ACNC: ABNORMAL
LIDOCAIN IGE QN: 20 U/L — SIGNIFICANT CHANGE UP (ref 7–60)
LYMPHOCYTES # BLD AUTO: 24.6 % — SIGNIFICANT CHANGE UP (ref 13–44)
LYMPHOCYTES # BLD AUTO: 3.25 K/UL — SIGNIFICANT CHANGE UP (ref 1–3.3)
MAGNESIUM SERPL-MCNC: 2 MG/DL — SIGNIFICANT CHANGE UP (ref 1.6–2.6)
MCHC RBC-ENTMCNC: 24.8 PG — LOW (ref 27–34)
MCHC RBC-ENTMCNC: 31.7 GM/DL — LOW (ref 32–36)
MCV RBC AUTO: 78 FL — LOW (ref 80–100)
MONOCYTES # BLD AUTO: 0.69 K/UL — SIGNIFICANT CHANGE UP (ref 0–0.9)
MONOCYTES NFR BLD AUTO: 5.2 % — SIGNIFICANT CHANGE UP (ref 2–14)
NEUTROPHILS # BLD AUTO: 9.02 K/UL — HIGH (ref 1.8–7.4)
NEUTROPHILS NFR BLD AUTO: 68.2 % — SIGNIFICANT CHANGE UP (ref 43–77)
NITRITE UR-MCNC: POSITIVE
NRBC # BLD: 0 /100 WBCS — SIGNIFICANT CHANGE UP (ref 0–0)
PCO2 BLDV: 33 MMHG — LOW (ref 39–42)
PH BLDV: 7.42 — SIGNIFICANT CHANGE UP (ref 7.32–7.43)
PH UR: 6 — SIGNIFICANT CHANGE UP (ref 5–8)
PLATELET # BLD AUTO: 570 K/UL — HIGH (ref 150–400)
PO2 BLDV: 61 MMHG — HIGH (ref 25–45)
POTASSIUM SERPL-MCNC: 3.8 MMOL/L — SIGNIFICANT CHANGE UP (ref 3.5–5.3)
POTASSIUM SERPL-SCNC: 3.8 MMOL/L — SIGNIFICANT CHANGE UP (ref 3.5–5.3)
PROT SERPL-MCNC: 8.3 G/DL — SIGNIFICANT CHANGE UP (ref 6–8.3)
PROT UR-MCNC: 30 MG/DL
PROTHROM AB SERPL-ACNC: 13.5 SEC — HIGH (ref 10.5–13.4)
RBC # BLD: 5.37 M/UL — HIGH (ref 3.8–5.2)
RBC # FLD: 14.8 % — HIGH (ref 10.3–14.5)
RBC CASTS # UR COMP ASSIST: > 10 /HPF
RSV RNA NPH QL NAA+NON-PROBE: SIGNIFICANT CHANGE UP
SAO2 % BLDV: 90.6 % — HIGH (ref 67–88)
SARS-COV-2 RNA SPEC QL NAA+PROBE: SIGNIFICANT CHANGE UP
SODIUM SERPL-SCNC: 131 MMOL/L — LOW (ref 135–145)
SP GR SPEC: 1.02 — SIGNIFICANT CHANGE UP (ref 1–1.03)
UROBILINOGEN FLD QL: 0.2 E.U./DL — SIGNIFICANT CHANGE UP
WBC # BLD: 13.22 K/UL — HIGH (ref 3.8–10.5)
WBC # FLD AUTO: 13.22 K/UL — HIGH (ref 3.8–10.5)
WBC UR QL: > 10 /HPF

## 2023-03-18 PROCEDURE — 99285 EMERGENCY DEPT VISIT HI MDM: CPT

## 2023-03-18 RX ORDER — DIATRIZOATE MEGLUMINE 180 MG/ML
30 INJECTION, SOLUTION INTRAVESICAL ONCE
Refills: 0 | Status: DISCONTINUED | OUTPATIENT
Start: 2023-03-18 | End: 2023-03-18

## 2023-03-18 RX ORDER — MORPHINE SULFATE 50 MG/1
4 CAPSULE, EXTENDED RELEASE ORAL ONCE
Refills: 0 | Status: DISCONTINUED | OUTPATIENT
Start: 2023-03-18 | End: 2023-03-18

## 2023-03-18 RX ORDER — DIPHENHYDRAMINE HCL 50 MG
50 CAPSULE ORAL ONCE
Refills: 0 | Status: COMPLETED | OUTPATIENT
Start: 2023-03-18 | End: 2023-03-18

## 2023-03-18 RX ORDER — SODIUM CHLORIDE 9 MG/ML
1000 INJECTION INTRAMUSCULAR; INTRAVENOUS; SUBCUTANEOUS ONCE
Refills: 0 | Status: COMPLETED | OUTPATIENT
Start: 2023-03-18 | End: 2023-03-18

## 2023-03-18 RX ORDER — METRONIDAZOLE 500 MG
500 TABLET ORAL ONCE
Refills: 0 | Status: COMPLETED | OUTPATIENT
Start: 2023-03-18 | End: 2023-03-18

## 2023-03-18 RX ORDER — CEFEPIME 1 G/1
2000 INJECTION, POWDER, FOR SOLUTION INTRAMUSCULAR; INTRAVENOUS ONCE
Refills: 0 | Status: COMPLETED | OUTPATIENT
Start: 2023-03-18 | End: 2023-03-18

## 2023-03-18 RX ADMIN — SODIUM CHLORIDE 1000 MILLILITER(S): 9 INJECTION INTRAMUSCULAR; INTRAVENOUS; SUBCUTANEOUS at 22:28

## 2023-03-18 RX ADMIN — MORPHINE SULFATE 4 MILLIGRAM(S): 50 CAPSULE, EXTENDED RELEASE ORAL at 23:52

## 2023-03-18 RX ADMIN — SODIUM CHLORIDE 1000 MILLILITER(S): 9 INJECTION INTRAMUSCULAR; INTRAVENOUS; SUBCUTANEOUS at 22:58

## 2023-03-18 RX ADMIN — Medication 100 MILLIGRAM(S): at 23:40

## 2023-03-18 RX ADMIN — Medication 50 MILLIGRAM(S): at 23:05

## 2023-03-18 RX ADMIN — CEFEPIME 100 MILLIGRAM(S): 1 INJECTION, POWDER, FOR SOLUTION INTRAMUSCULAR; INTRAVENOUS at 22:46

## 2023-03-18 NOTE — ED PROVIDER NOTE - NS ED ATTENDING STATEMENT MOD
This was a shared visit with the RUSTY. I reviewed and verified the documentation and independently performed the documented:

## 2023-03-18 NOTE — ED ADULT NURSE NOTE - NSIMPLEMENTINTERV_GEN_ALL_ED
Implemented All Fall Risk Interventions:  [place] to call system. Call bell, personal items and telephone within reach. Instruct patient to call for assistance. Room bathroom lighting operational. Non-slip footwear when patient is off stretcher. Physically safe environment: no spills, clutter or unnecessary equipment. Stretcher in lowest position, wheels locked, appropriate side rails in place. Provide visual cue, wrist band, yellow gown, etc. Monitor gait and stability. Monitor for mental status changes and reorient to person, place, and time. Review medications for side effects contributing to fall risk. Reinforce activity limits and safety measures with patient and family.

## 2023-03-18 NOTE — ED PROVIDER NOTE - ATTENDING APP SHARED VISIT CONTRIBUTION OF CARE
44 yo F with multiple medical problems, HTN, DM, ostomy, suprapubic cath p/w 3-4 days of cloudy urine output, excessive diarrhea from ostomy and uncontrolled FS. Abd with diffuse TTP, no r/g, concern for possible urosepsis vs other intrabdominal pathology. Ordered for labs, ua, abx, CT, reassess. 44 yo F with multiple medical problems, HTN, DM, ostomy, suprapubic cath p/w 3-4 days of cloudy urine output, excessive diarrhea from ostomy and uncontrolled FS. Abd with diffuse TTP, no r/g, concern for possible urosepsis vs other intrabdominal pathology. Ordered for labs, ua, abx, CT, reassess. Anticipate admission.

## 2023-03-18 NOTE — ED PROVIDER NOTE - OBJECTIVE STATEMENT
43 yr old female, history of morbidly obese female, with PMH of uncontrolled DM2, HTN, HLD, multiple admissions in past for UTI, most recent admission at [facility] for urosepsis, discharged on iv abx through LUE (removed 3 wks ago), presents to the Emergency Department for multiple complaints. 43 yr old female, history of morbidly obese female, with PMH of uncontrolled DM2, HTN, HLD, multiple admissions in past for UTI, most recent admission at [place] for urosepsis, discharged on iv abx through LUE (removed 3 wks ago), presents to the Emergency Department for multiple complaints. pt concerned that she has an infection. concerned that it is again a urine infection. has noticed cloudy / pink tinged urine output from suprapubic cath - last changed 4 weeks ago. feels there is some redness and pain at the cath insertion site.  also notes diarrhea from her ostomy x few days. no blood noted.  diffuse mid abdominal discomfort.   over last week blood sugars has been uncontrolled at home - in 400s and has been feeling weak / run down. also notes chills / sweats at night. no measured fever.   no cough, chest pain, sob, leg swelling, headache, vision changes, polyuria, polydipsia.

## 2023-03-18 NOTE — ED ADULT TRIAGE NOTE - CHIEF COMPLAINT QUOTE
Pt presents with multiple medical complaints to include "SOB, gen malaise. pain to BLQ and suprapubic pain, foul smelling urine with sediment, chills, diarrhea (noted to colostomy bag, pt also has suprapubic catheter) and elevated blood sugar".  in triage.

## 2023-03-18 NOTE — ED ADULT NURSE REASSESSMENT NOTE - NS ED NURSE REASSESS COMMENT FT1
as per RACHELE Santamaria, pt. has been given and tolerated abx cefepime / Maxipime well.   Pt. given IV Benadryl prior to CT as per pt. that is effective fro her prior to contrast for ct.   Pt. c/o mild diffuse body itchiness. Whole body / skin assessed, no hives, redness, or rash seen. PA and MD made aware. Pt. denies any respiratory s/s, itchiness to face, swelling or lips, tongue, or new onset of pain / discomfort. will continue to assess.

## 2023-03-18 NOTE — ED ADULT NURSE NOTE - OBJECTIVE STATEMENT
Pt. a&ox4 ambulatory with walker, extensive pmhx / prior hospitalizations d/t sepsis, PICC with home IV abx, suprapubic cath changed q4 weeks, last time changed 4 weeks ago, colostomy, morbid obesity, DM2, comes to ED with chief c/o SOB / FORBES on minimal exertion x 5 days, despite use of MDI that typically works for her. Pt. reports subjective f/c, uncontrolled BGLs despite compliance with insulins @ home, and generalized weakness, with change in urine appearance, cloudiness with foul smell, and looser stool coming through colostomy, with generalized malaise and lower abdominal pain BLQ. Pt. tachycardic in triage, placed on ccm, pulseox, large bore IV placed, and stat EKG performed.

## 2023-03-18 NOTE — ED PROVIDER NOTE - PHYSICAL EXAMINATION
Constitutional : Well appearing, non-toxic, no acute distress. awake, alert, oriented to person, place, time/situation.  Head : head normocephalic, atraumatic  EENMT : eyes clear bilaterally, PERRL, EOMI. airway patent. moist mucous membranes. neck supple.  Cardiac : tachycardia, regular rhythm. No murmur appreciated, no LE edema.  Resp : Breath sounds clear and equal bilaterally. Respirations even and unlabored.   Gastro : abdomen soft, nontender. +suprapubic cath in place w clear urine. mild surrounding redness at site but no discharge or drainage. no fluctuance. L mid abdomen - ostomy in place w light brown, soft stool in ostomy bag. surrounding area C/D/I.  MSK :  range of motion is not limited, no muscle or joint tenderness  Back : No evidence of trauma. No spinal or CVA tenderness.  Vasc : Extremities warm and well perfused. 2+ radial and DP pulses. cap refill <2 seconds  Neuro : Alert and oriented, CNII-XII grossly intact, no focal deficits, no motor or sensory deficits.  Skin : Skin normal color for race, warm, dry and intact. No evidence of rash.  Psych : Alert and oriented to person, place, time/situation. normal mood and affect. no apparent risk to self or others.

## 2023-03-18 NOTE — ED PROVIDER NOTE - PROGRESS NOTE DETAILS
wbc count 13.22  fingerstick on arrival 496, after 2L fluids down to 344, will give 5un insulin  initial lactate 5.4, downtrended to 2.7.   UA trace leuks, nitrite positive, >10wbcs. culture sent and running.   CTAP "IMPRESSION:  1.   4 cm right ovarian cyst.  2.   Pigtail suprapubic cystostomy catheter in place in the urinary   bladder. There is diffuse urinary bladder inflammation suggesting cystitis."    potassium borderline low so give oral repletion to go with insulin. s/p fluids and cefepime / flagyl. s/p iv fluids.   will admit for urosepsis. hyperglycemia. HR improved since arrival.

## 2023-03-18 NOTE — ED PROVIDER NOTE - CLINICAL SUMMARY MEDICAL DECISION MAKING FREE TEXT BOX
history of morbidly obese female, with PMH of uncontrolled DM2, HTN, HLD, multiple admissions in past for UTI, most recent admission at [place] for urosepsis, discharged on iv abx through LUE (removed 3 wks ago). here w change in urine output from suprapubic cath, diarrhea from ostomy. uncontrolled BG (400s) and feeling weak / run down w subjective fevers. symptoms over course of 1 week, worse last few days.  pt nontoxic appearing, on arrival borderline febrile, tachy to 120s, normotensive. exam as above - mild surrounding redness to suprapubic cath site, no discharge, clear urine output. abd soft, nontender.   hyperglycemia on arrival to 500.   hx and symptoms concerning for infection - meets sepsis criteria  suspect UTI source  r/o intra-abdominal abscess or other intr-abdominal pathology  plan - labs, ua/ucx, blood cultures, ctap  given cefepime and flagyl   2L iv fluids  admit

## 2023-03-19 ENCOUNTER — INPATIENT (INPATIENT)
Facility: HOSPITAL | Age: 43
LOS: 7 days | Discharge: HOME | DRG: 698 | End: 2023-03-27
Attending: STUDENT IN AN ORGANIZED HEALTH CARE EDUCATION/TRAINING PROGRAM | Admitting: INTERNAL MEDICINE
Payer: MEDICAID

## 2023-03-19 DIAGNOSIS — Z93.9 ARTIFICIAL OPENING STATUS, UNSPECIFIED: ICD-10-CM

## 2023-03-19 DIAGNOSIS — Z98.890 OTHER SPECIFIED POSTPROCEDURAL STATES: Chronic | ICD-10-CM

## 2023-03-19 DIAGNOSIS — I10 ESSENTIAL (PRIMARY) HYPERTENSION: ICD-10-CM

## 2023-03-19 DIAGNOSIS — Z98.89 OTHER SPECIFIED POSTPROCEDURAL STATES: Chronic | ICD-10-CM

## 2023-03-19 DIAGNOSIS — E11.9 TYPE 2 DIABETES MELLITUS WITHOUT COMPLICATIONS: ICD-10-CM

## 2023-03-19 DIAGNOSIS — E66.9 OBESITY, UNSPECIFIED: ICD-10-CM

## 2023-03-19 DIAGNOSIS — A41.9 SEPSIS, UNSPECIFIED ORGANISM: ICD-10-CM

## 2023-03-19 DIAGNOSIS — Z00.00 ENCOUNTER FOR GENERAL ADULT MEDICAL EXAMINATION WITHOUT ABNORMAL FINDINGS: ICD-10-CM

## 2023-03-19 LAB
ANION GAP SERPL CALC-SCNC: 12 MMOL/L — SIGNIFICANT CHANGE UP (ref 5–17)
BLD GP AB SCN SERPL QL: POSITIVE — SIGNIFICANT CHANGE UP
BUN SERPL-MCNC: 8 MG/DL — SIGNIFICANT CHANGE UP (ref 7–23)
CALCIUM SERPL-MCNC: 8.5 MG/DL — SIGNIFICANT CHANGE UP (ref 8.4–10.5)
CHLORIDE SERPL-SCNC: 99 MMOL/L — SIGNIFICANT CHANGE UP (ref 96–108)
CO2 SERPL-SCNC: 23 MMOL/L — SIGNIFICANT CHANGE UP (ref 22–31)
CREAT SERPL-MCNC: 0.63 MG/DL — SIGNIFICANT CHANGE UP (ref 0.5–1.3)
EGFR: 113 ML/MIN/1.73M2 — SIGNIFICANT CHANGE UP
GLUCOSE BLDC GLUCOMTR-MCNC: 127 MG/DL — HIGH (ref 70–99)
GLUCOSE BLDC GLUCOMTR-MCNC: 176 MG/DL — HIGH (ref 70–99)
GLUCOSE BLDC GLUCOMTR-MCNC: 338 MG/DL — HIGH (ref 70–99)
GLUCOSE SERPL-MCNC: 378 MG/DL — HIGH (ref 70–99)
HCT VFR BLD CALC: 37.5 % — SIGNIFICANT CHANGE UP (ref 34.5–45)
HGB BLD-MCNC: 11.3 G/DL — LOW (ref 11.5–15.5)
LACTATE SERPL-SCNC: 2.7 MMOL/L — HIGH (ref 0.5–2)
LACTATE SERPL-SCNC: 2.7 MMOL/L — HIGH (ref 0.5–2)
MCHC RBC-ENTMCNC: 24.5 PG — LOW (ref 27–34)
MCHC RBC-ENTMCNC: 30.1 GM/DL — LOW (ref 32–36)
MCV RBC AUTO: 81.2 FL — SIGNIFICANT CHANGE UP (ref 80–100)
NRBC # BLD: 0 /100 WBCS — SIGNIFICANT CHANGE UP (ref 0–0)
PLATELET # BLD AUTO: 390 K/UL — SIGNIFICANT CHANGE UP (ref 150–400)
POTASSIUM SERPL-MCNC: 4.1 MMOL/L — SIGNIFICANT CHANGE UP (ref 3.5–5.3)
POTASSIUM SERPL-SCNC: 4.1 MMOL/L — SIGNIFICANT CHANGE UP (ref 3.5–5.3)
RBC # BLD: 4.62 M/UL — SIGNIFICANT CHANGE UP (ref 3.8–5.2)
RBC # FLD: 15.2 % — HIGH (ref 10.3–14.5)
RH IG SCN BLD-IMP: NEGATIVE — SIGNIFICANT CHANGE UP
SODIUM SERPL-SCNC: 134 MMOL/L — LOW (ref 135–145)
WBC # BLD: 10.92 K/UL — HIGH (ref 3.8–10.5)
WBC # FLD AUTO: 10.92 K/UL — HIGH (ref 3.8–10.5)

## 2023-03-19 PROCEDURE — 99223 1ST HOSP IP/OBS HIGH 75: CPT

## 2023-03-19 PROCEDURE — 74177 CT ABD & PELVIS W/CONTRAST: CPT | Mod: 26,MA

## 2023-03-19 RX ORDER — DEXTROSE 50 % IN WATER 50 %
25 SYRINGE (ML) INTRAVENOUS ONCE
Refills: 0 | Status: DISCONTINUED | OUTPATIENT
Start: 2023-03-19 | End: 2023-03-27

## 2023-03-19 RX ORDER — MORPHINE SULFATE 50 MG/1
2 CAPSULE, EXTENDED RELEASE ORAL ONCE
Refills: 0 | Status: DISCONTINUED | OUTPATIENT
Start: 2023-03-19 | End: 2023-03-19

## 2023-03-19 RX ORDER — GLUCAGON INJECTION, SOLUTION 0.5 MG/.1ML
1 INJECTION, SOLUTION SUBCUTANEOUS ONCE
Refills: 0 | Status: DISCONTINUED | OUTPATIENT
Start: 2023-03-19 | End: 2023-03-27

## 2023-03-19 RX ORDER — DEXTROSE 50 % IN WATER 50 %
12.5 SYRINGE (ML) INTRAVENOUS ONCE
Refills: 0 | Status: DISCONTINUED | OUTPATIENT
Start: 2023-03-19 | End: 2023-03-27

## 2023-03-19 RX ORDER — SODIUM CHLORIDE 9 MG/ML
1000 INJECTION, SOLUTION INTRAVENOUS
Refills: 0 | Status: DISCONTINUED | OUTPATIENT
Start: 2023-03-19 | End: 2023-03-27

## 2023-03-19 RX ORDER — PIPERACILLIN AND TAZOBACTAM 4; .5 G/20ML; G/20ML
4.5 INJECTION, POWDER, LYOPHILIZED, FOR SOLUTION INTRAVENOUS ONCE
Refills: 0 | Status: COMPLETED | OUTPATIENT
Start: 2023-03-19 | End: 2023-03-19

## 2023-03-19 RX ORDER — HUMAN INSULIN 100 [IU]/ML
10 INJECTION, SUSPENSION SUBCUTANEOUS ONCE
Refills: 0 | Status: DISCONTINUED | OUTPATIENT
Start: 2023-03-19 | End: 2023-03-19

## 2023-03-19 RX ORDER — ENOXAPARIN SODIUM 100 MG/ML
40 INJECTION SUBCUTANEOUS EVERY 24 HOURS
Refills: 0 | Status: DISCONTINUED | OUTPATIENT
Start: 2023-03-19 | End: 2023-03-20

## 2023-03-19 RX ORDER — INSULIN GLARGINE 100 [IU]/ML
40 INJECTION, SOLUTION SUBCUTANEOUS ONCE
Refills: 0 | Status: COMPLETED | OUTPATIENT
Start: 2023-03-19 | End: 2023-03-19

## 2023-03-19 RX ORDER — INSULIN LISPRO 100/ML
VIAL (ML) SUBCUTANEOUS
Refills: 0 | Status: DISCONTINUED | OUTPATIENT
Start: 2023-03-19 | End: 2023-03-19

## 2023-03-19 RX ORDER — INSULIN HUMAN 100 [IU]/ML
5 INJECTION, SOLUTION SUBCUTANEOUS ONCE
Refills: 0 | Status: COMPLETED | OUTPATIENT
Start: 2023-03-19 | End: 2023-03-19

## 2023-03-19 RX ORDER — DEXTROSE 50 % IN WATER 50 %
15 SYRINGE (ML) INTRAVENOUS ONCE
Refills: 0 | Status: DISCONTINUED | OUTPATIENT
Start: 2023-03-19 | End: 2023-03-27

## 2023-03-19 RX ORDER — HYDROMORPHONE HYDROCHLORIDE 2 MG/ML
0.5 INJECTION INTRAMUSCULAR; INTRAVENOUS; SUBCUTANEOUS EVERY 4 HOURS
Refills: 0 | Status: DISCONTINUED | OUTPATIENT
Start: 2023-03-19 | End: 2023-03-20

## 2023-03-19 RX ORDER — IPRATROPIUM/ALBUTEROL SULFATE 18-103MCG
3 AEROSOL WITH ADAPTER (GRAM) INHALATION EVERY 6 HOURS
Refills: 0 | Status: DISCONTINUED | OUTPATIENT
Start: 2023-03-19 | End: 2023-03-27

## 2023-03-19 RX ORDER — INSULIN LISPRO 100/ML
30 VIAL (ML) SUBCUTANEOUS
Refills: 0 | Status: DISCONTINUED | OUTPATIENT
Start: 2023-03-19 | End: 2023-03-19

## 2023-03-19 RX ORDER — SODIUM CHLORIDE 9 MG/ML
1000 INJECTION INTRAMUSCULAR; INTRAVENOUS; SUBCUTANEOUS
Refills: 0 | Status: DISCONTINUED | OUTPATIENT
Start: 2023-03-19 | End: 2023-03-23

## 2023-03-19 RX ORDER — PIPERACILLIN AND TAZOBACTAM 4; .5 G/20ML; G/20ML
4.5 INJECTION, POWDER, LYOPHILIZED, FOR SOLUTION INTRAVENOUS EVERY 8 HOURS
Refills: 0 | Status: DISCONTINUED | OUTPATIENT
Start: 2023-03-19 | End: 2023-03-22

## 2023-03-19 RX ORDER — KETOROLAC TROMETHAMINE 30 MG/ML
15 SYRINGE (ML) INJECTION EVERY 6 HOURS
Refills: 0 | Status: DISCONTINUED | OUTPATIENT
Start: 2023-03-19 | End: 2023-03-20

## 2023-03-19 RX ORDER — INSULIN LISPRO 100/ML
30 VIAL (ML) SUBCUTANEOUS
Qty: 0 | Refills: 0 | DISCHARGE

## 2023-03-19 RX ORDER — CEFTRIAXONE 500 MG/1
2000 INJECTION, POWDER, FOR SOLUTION INTRAMUSCULAR; INTRAVENOUS EVERY 24 HOURS
Refills: 0 | Status: DISCONTINUED | OUTPATIENT
Start: 2023-03-19 | End: 2023-03-19

## 2023-03-19 RX ORDER — METFORMIN HYDROCHLORIDE 850 MG/1
1 TABLET ORAL
Qty: 0 | Refills: 0 | DISCHARGE

## 2023-03-19 RX ORDER — POTASSIUM CHLORIDE 20 MEQ
40 PACKET (EA) ORAL ONCE
Refills: 0 | Status: COMPLETED | OUTPATIENT
Start: 2023-03-19 | End: 2023-03-19

## 2023-03-19 RX ORDER — POTASSIUM CHLORIDE 20 MEQ
20 PACKET (EA) ORAL ONCE
Refills: 0 | Status: DISCONTINUED | OUTPATIENT
Start: 2023-03-19 | End: 2023-03-19

## 2023-03-19 RX ORDER — INSULIN LISPRO 100/ML
20 VIAL (ML) SUBCUTANEOUS
Refills: 0 | Status: DISCONTINUED | OUTPATIENT
Start: 2023-03-19 | End: 2023-03-20

## 2023-03-19 RX ORDER — AMLODIPINE BESYLATE 2.5 MG/1
1 TABLET ORAL
Qty: 0 | Refills: 0 | DISCHARGE

## 2023-03-19 RX ORDER — KETAMINE HYDROCHLORIDE 100 MG/ML
15 INJECTION INTRAMUSCULAR; INTRAVENOUS ONCE
Refills: 0 | Status: DISCONTINUED | OUTPATIENT
Start: 2023-03-19 | End: 2023-03-19

## 2023-03-19 RX ORDER — INSULIN GLARGINE 100 [IU]/ML
50 INJECTION, SOLUTION SUBCUTANEOUS AT BEDTIME
Refills: 0 | Status: DISCONTINUED | OUTPATIENT
Start: 2023-03-19 | End: 2023-03-19

## 2023-03-19 RX ORDER — INSULIN LISPRO 100/ML
VIAL (ML) SUBCUTANEOUS
Refills: 0 | Status: DISCONTINUED | OUTPATIENT
Start: 2023-03-19 | End: 2023-03-20

## 2023-03-19 RX ORDER — KETOROLAC TROMETHAMINE 30 MG/ML
15 SYRINGE (ML) INJECTION ONCE
Refills: 0 | Status: DISCONTINUED | OUTPATIENT
Start: 2023-03-19 | End: 2023-03-19

## 2023-03-19 RX ADMIN — PIPERACILLIN AND TAZOBACTAM 200 GRAM(S): 4; .5 INJECTION, POWDER, LYOPHILIZED, FOR SOLUTION INTRAVENOUS at 06:02

## 2023-03-19 RX ADMIN — SODIUM CHLORIDE 60 MILLILITER(S): 9 INJECTION INTRAMUSCULAR; INTRAVENOUS; SUBCUTANEOUS at 06:24

## 2023-03-19 RX ADMIN — HYDROMORPHONE HYDROCHLORIDE 0.5 MILLIGRAM(S): 2 INJECTION INTRAMUSCULAR; INTRAVENOUS; SUBCUTANEOUS at 16:56

## 2023-03-19 RX ADMIN — HYDROMORPHONE HYDROCHLORIDE 0.5 MILLIGRAM(S): 2 INJECTION INTRAMUSCULAR; INTRAVENOUS; SUBCUTANEOUS at 17:11

## 2023-03-19 RX ADMIN — MORPHINE SULFATE 4 MILLIGRAM(S): 50 CAPSULE, EXTENDED RELEASE ORAL at 00:22

## 2023-03-19 RX ADMIN — Medication 3 MILLILITER(S): at 16:56

## 2023-03-19 RX ADMIN — Medication 15 MILLIGRAM(S): at 21:51

## 2023-03-19 RX ADMIN — Medication 15 MILLIGRAM(S): at 22:05

## 2023-03-19 RX ADMIN — INSULIN HUMAN 5 UNIT(S): 100 INJECTION, SOLUTION SUBCUTANEOUS at 00:48

## 2023-03-19 RX ADMIN — Medication 15 MILLIGRAM(S): at 13:10

## 2023-03-19 RX ADMIN — INSULIN GLARGINE 40 UNIT(S): 100 INJECTION, SOLUTION SUBCUTANEOUS at 17:02

## 2023-03-19 RX ADMIN — ENOXAPARIN SODIUM 40 MILLIGRAM(S): 100 INJECTION SUBCUTANEOUS at 03:46

## 2023-03-19 RX ADMIN — Medication 15 MILLIGRAM(S): at 13:25

## 2023-03-19 RX ADMIN — Medication 30 UNIT(S): at 09:43

## 2023-03-19 RX ADMIN — HYDROMORPHONE HYDROCHLORIDE 0.5 MILLIGRAM(S): 2 INJECTION INTRAMUSCULAR; INTRAVENOUS; SUBCUTANEOUS at 09:49

## 2023-03-19 RX ADMIN — Medication 8: at 07:07

## 2023-03-19 RX ADMIN — Medication 15 MILLIGRAM(S): at 09:11

## 2023-03-19 RX ADMIN — PIPERACILLIN AND TAZOBACTAM 25 GRAM(S): 4; .5 INJECTION, POWDER, LYOPHILIZED, FOR SOLUTION INTRAVENOUS at 08:38

## 2023-03-19 RX ADMIN — Medication 30 UNIT(S): at 13:00

## 2023-03-19 RX ADMIN — CEFTRIAXONE 100 MILLIGRAM(S): 500 INJECTION, POWDER, FOR SOLUTION INTRAMUSCULAR; INTRAVENOUS at 03:46

## 2023-03-19 RX ADMIN — HYDROMORPHONE HYDROCHLORIDE 0.5 MILLIGRAM(S): 2 INJECTION INTRAMUSCULAR; INTRAVENOUS; SUBCUTANEOUS at 09:11

## 2023-03-19 RX ADMIN — Medication 15 MILLIGRAM(S): at 09:49

## 2023-03-19 RX ADMIN — PIPERACILLIN AND TAZOBACTAM 25 GRAM(S): 4; .5 INJECTION, POWDER, LYOPHILIZED, FOR SOLUTION INTRAVENOUS at 16:40

## 2023-03-19 RX ADMIN — MORPHINE SULFATE 2 MILLIGRAM(S): 50 CAPSULE, EXTENDED RELEASE ORAL at 04:11

## 2023-03-19 RX ADMIN — Medication 20 UNIT(S): at 17:48

## 2023-03-19 RX ADMIN — Medication 40 MILLIEQUIVALENT(S): at 00:49

## 2023-03-19 RX ADMIN — Medication 2: at 17:03

## 2023-03-19 RX ADMIN — MORPHINE SULFATE 2 MILLIGRAM(S): 50 CAPSULE, EXTENDED RELEASE ORAL at 04:28

## 2023-03-19 NOTE — H&P ADULT - NSHPLABSRESULTS_GEN_ALL_CORE
LABS:                        13.3   13. )-----------( 570      ( 18 Mar 2023 22:33 )             41.9     03-18    131<L>  |  93<L>  |  10  ----------------------------<  575<HH>  3.8   |  21<L>  |  0.72    Ca    9.8      18 Mar 2023 22:33  Mg     2.0     03-18    TPro  8.3  /  Alb  3.9  /  TBili  0.2  /  DBili  x   /  AST  15  /  ALT  8<L>  /  AlkPhos  182<H>  -18      PT/INR - ( 18 Mar 2023 22:33 )   PT: 13.5 sec;   INR: 1.13          PTT - ( 18 Mar 2023 22:33 )  PTT:30.1 sec  CAPILLARY BLOOD GLUCOSE      POCT Blood Glucose.: 205 mg/dL (19 Mar 2023 01:18)              Urinalysis Basic - ( 18 Mar 2023 23:03 )    Color: Yellow / Appearance: Clear / S.025 / pH: x  Gluc: x / Ketone: NEGATIVE  / Bili: Negative / Urobili: 0.2 E.U./dL   Blood: x / Protein: 30 mg/dL / Nitrite: POSITIVE   Leuk Esterase: Trace / RBC: > 10 /HPF / WBC > 10 /HPF   Sq Epi: x / Non Sq Epi: 0-5 /HPF / Bacteria: Present /HPF        I & O Summary:      Microbiology:        RADIOLOGY, EKG AND ADDITIONAL TESTS: Reviewed.

## 2023-03-19 NOTE — H&P ADULT - ATTENDING COMMENTS
A/P:  1.sepsis due to complicated UTI (suprapubic catheter)  - continue zosyn, follow ucx and de-escalate as necessary  - WBC unchanged  - blood cx NGTD  - CT A/P with bladder wall thickening  - UA reviewed, grossly positive.    2. IDDM a1c 10 with hyperglycemia  - on humulin 200mg TID and admelog  - endocrine reccs appreciated  - continuing lantus 40 units x 1 this morning as she did not receive basal insulin last nigth  - continue lantus 50units subq bediteme, lispro 30units TID and ISS    3. colostomy care  - CT A/P reviewed, stable colon without inflammation    rest of plan as above

## 2023-03-19 NOTE — H&P ADULT - NSHPPHYSICALEXAM_GEN_ALL_CORE
VITAL SIGNS:  T(C): 37.5 (03-18-23 @ 21:56), Max: 37.5 (03-18-23 @ 21:56)  T(F): 99.5 (03-18-23 @ 21:56), Max: 99.5 (03-18-23 @ 21:56)  HR: 123 (03-18-23 @ 21:56) (123 - 123)  BP: 141/93 (03-18-23 @ 21:56) (141/93 - 141/93)  BP(mean): --  RR: 18 (03-18-23 @ 21:56) (18 - 18)  SpO2: 98% (03-18-23 @ 21:56) (98% - 98%)  Wt(kg): --    PHYSICAL EXAM:  Constitutional: WDWN resting comfortably in bed; NAD  Head: NC/AT  Eyes: PERRL, EOMI, anicteric sclera  ENT: no nasal discharge; MMM  Neck: supple; no JVD  Respiratory: CTA B/L; no W/R/R  Cardiac: +S1/S2; RRR; no M/R/G  Gastrointestinal: soft, NT/ND; no rebound or guarding; +BSx4  Extremities: WWP, no clubbing or cyanosis; no peripheral edema  Musculoskeletal: NROM x4; no joint swelling, tenderness or erythema  Vascular: 2+ radial, DP/PT pulses B/L  Dermatologic: skin warm, dry and intact; no rashes, wounds, or scars  Neurologic: AAOx3; CNII-XII grossly intact; no focal deficits  Psychiatric: affect and characteristics of appearance, verbalizations, behaviors are appropriate VITAL SIGNS:  T(C): 37.5 (03-18-23 @ 21:56), Max: 37.5 (03-18-23 @ 21:56)  T(F): 99.5 (03-18-23 @ 21:56), Max: 99.5 (03-18-23 @ 21:56)  HR: 123 (03-18-23 @ 21:56) (123 - 123)  BP: 141/93 (03-18-23 @ 21:56) (141/93 - 141/93)  BP(mean): --  RR: 18 (03-18-23 @ 21:56) (18 - 18)  SpO2: 98% (03-18-23 @ 21:56) (98% - 98%)  Wt(kg): --    PHYSICAL EXAM:  Constitutional: WDWN resting comfortably in bed; NAD  Head: NC/AT  Eyes: PERRL, EOMI, anicteric sclera  ENT: no nasal discharge; MMM  Neck: supple; no JVD  Respiratory: CTA B/L; no W/R/R  Cardiac: +S1/S2; RRR; no M/R/G  Gastrointestinal: soft, NT/ND; no rebound or guarding: [place] bag noted with suprapubic catheter also noted with normal urine output with redness around the suprapubic insertion site   Extremities: WWP, no clubbing or cyanosis; no peripheral edema  Musculoskeletal: NROM x4; no joint swelling, tenderness or erythema  Vascular: 2+ radial, DP/PT pulses B/L  Dermatologic: skin warm, dry and intact; no rashes, wounds, or scars  Neurologic: AAOx3, no focal deficits  Psychiatric: affect and characteristics of appearance, verbalizations, behaviors are appropriate

## 2023-03-19 NOTE — H&P ADULT - PROBLEM SELECTOR PLAN 3
Home med: A1c of 10 on previous admission   Home med: Admelog 35units premeals and Humulin 200u TID   Hyperglycemic oh admission with FS>500, downtrended afer fluids and Insulin s/q   - c/w mISS as of now   - FS q4-q6 for now  - f/u A1c  - Endo consult in the AM A1c of 10 on previous admission   Home med: Admelog 35units premeals and Humulin 200u TID   Hyperglycemic oh admission with FS>500, downtrended afer fluids and Insulin s/q   - c/w mISS as of now   - Lantus 30u qhs   - Admelog 35units premeals   - FS q4-q6 for now  - f/u A1c  - Endo consult in the AM A1c of 10 on previous admission   Home med: Admelog 35units premeals and Humulin 200u TID   Hyperglycemic oh admission with FS>500, downtrended afer fluids and Insulin s/q   - c/w mISS as of now   - Lantus 50u qhs   - Admelog 35units pre-meals   - FS q4-q6 for now  - f/u A1c  - Endo consult in the AM

## 2023-03-19 NOTE — H&P ADULT - PROBLEM SELECTOR PLAN 1
i/s/o of Urinary tract infection   SIRS Positive for tachycardia and WBC +lactic acidosis >5 with suspected source of infection   CT Abd/pelvis   - c/w ceftriaxone 2g IV q24  - f/u urine culture   - f/u blood culture   - Tylenol PRN i/s/o of Urinary tract infection   SIRS Positive for tachycardia and WBC +lactic acidosis >5 with suspected source of infection   - c/w ceftriaxone 2g IV q24  - f/u urine culture   - f/u blood culture   - Tylenol PRN for fevers i/s/o of Urinary tract infection   SIRS Positive for tachycardia and WBC +lactic acidosis >5 with suspected source of infection   - c/w zosyn due to multiple hospitalization and concern for broader coverage( Patient denied any anaphylactic reaction from penicillin)   - f/u urine culture   - f/u blood culture   - Tylenol PRN for fevers

## 2023-03-19 NOTE — CONSULT NOTE ADULT - SUBJECTIVE AND OBJECTIVE BOX
HPI: This is a 43 year old woman with PMH of poorly controlled type 2 DM on insulin (outpt endo Dr. Nisha Joseph, last seen 2023), suprapubic necrotizing fasciitis, admitted for complicated UTI for whom endocrine is consulted for hyperglycemia.     T2DM history:  -Dx in .   -: started metformin 1000 mg BID.   -: was pregnant and started on glyburide- did not require insulin for pregnancy.   - 2020: began seeing Dr. Joseph. Eventually, insulin regimen changed to U500  -last recorded outpatient regimen (clinic note 2023): U500 100 units TID with meals. If glucose levels do not improve, add Admelog 30 units TID with meals    ROS:  -Constitutional: no weight change, no f/c/sweats, no fatigue. no insomnia. no heat/cold intolerance.  -Skin: No change. no hair changes. no bruisability.  -Eye:  no vision changes, no peripheral vision loss. No blurriness.  -Neck: no neck enlargement, dysphagia, odynophagia.   -CV: no cp, no palpitations, no change in exercise tolerance, no daytime somnolence  -Pulm: no SOB  -GI: no n/v/c/d/abd pain  -: no polyuria/polydipsia. no nocturia.   -MSK: no LE edema, no proximal muscle weakness, no hx fractures  -Neuro: no tremors. No dizziness, lightheadedness.   -10 point ROS otherwise negative    Past Medical History:  Essential hypertension    Hyperlipidemia    Diabetes    Obesity    Hernia    Abdominal hernia    Pre-eclampsia        Past Surgical history:  H/O LEEP    History of D&amp;C    H/O:     H/O ventral hernia repair    H/O exploratory laparotomy        Family History:  No pertinent family history in first degree relatives    FH: diabetes mellitus    FH: hypertension        Allergies: amoxicillin (Unknown)  Bactrim I.V. (Rash (Mod to Severe))  clindamycin (Pruritus)  iodine containing compounds (Hives; Anaphylaxis)  penicillin (Hives)  shellfish. (Hives; Anaphylaxis)      Home Medications:  Admelo unit(s) injectable 3 times a day (before meals)  HumuLIN N KwikPen: 200 unit(s) subcutaneous 3 times  labetalol 100 mg oral tablet: 1 dose(s) orally once a day    Inpatient Medications:  dextrose 5%. 1000 milliLiter(s) IV Continuous <Continuous>  dextrose 5%. 1000 milliLiter(s) IV Continuous <Continuous>  dextrose 50% Injectable 25 Gram(s) IV Push once  dextrose 50% Injectable 12.5 Gram(s) IV Push once  dextrose 50% Injectable 25 Gram(s) IV Push once  dextrose Oral Gel 15 Gram(s) Oral once PRN  enoxaparin Injectable 40 milliGRAM(s) SubCutaneous every 24 hours  glucagon  Injectable 1 milliGRAM(s) IntraMuscular once  HYDROmorphone  Injectable 0.5 milliGRAM(s) IV Push every 4 hours PRN  insulin glargine Injectable (LANTUS) 50 Unit(s) SubCutaneous at bedtime  insulin lispro (ADMELOG) corrective regimen sliding scale   SubCutaneous three times a day before meals  insulin lispro Injectable (ADMELOG) 30 Unit(s) SubCutaneous three times a day before meals  piperacillin/tazobactam IVPB.. 4.5 Gram(s) IV Intermittent every 8 hours  sodium chloride 0.9%. 1000 milliLiter(s) IV Continuous <Continuous>    Exam:  Vitals:  T(C): 37.1 (23 @ 08:24), Max: 37.5 (23 @ 21:56)  HR: 94 (23 @ 08:24) (94 - 123)  BP: 142/85 (23 @ 08:24) (125/81 - 148/91)  ABP: --  RR: 18 (23 @ 08:24) (18 - 18)  SpO2: 97% (23 @ 08:24) (97% - 98%)  Height (cm): 170.2 (23 @ 21:56)  Weight (kg): 99.8 (23 @ 21:56)  BMI (kg/m2): 34.5 (23 @ 21:56)  BSA (m2): 2.11 (23 @ 21:56)    23 @ 07:01  -  23 @ 10:58  --------------------------------------------------------  IN: 0 mL / OUT: 300 mL / NET: -300 mL      GEN: well appearing, NAD, intact mental status, A&O  SKIN: no skin hyperpigmentation  HEENT: EOMI, no proptosis, no lid lag  NECK: no thyromegaly, no nodules.   CV: RRR  LUNGS: CTAB  ABD: Soft, nontender, nondistended. Normoactive bowel sounds  NEURO: No tremors. Normoactive reflexes  EXT no edema  PSYCH: normal mood, affect    Labs:      134<L>  |  99  |  8   ----------------------------<  378<H>  4.1   |  23  |  0.63    Ca    8.5      19 Mar 2023 07:43  Mg     2.0         TPro  8.3  /  Alb  3.9  /  TBili  0.2  /  DBili  x   /  AST  15  /  ALT  8<L>  /  AlkPhos  182<H>                          11.3   10.92 )-----------( 390      ( 19 Mar 2023 07:43 )             37.5   LIVER FUNCTIONS - ( 18 Mar 2023 22:33 )  Alb: 3.9 g/dL / Pro: 8.3 g/dL / ALK PHOS: 182 U/L / ALT: 8 U/L / AST: 15 U/L / GGT: x         CAPILLARY BLOOD GLUCOSE      POCT Blood Glucose.: 338 mg/dL (19 Mar 2023 06:47)  POCT Blood Glucose.: 205 mg/dL (19 Mar 2023 01:18)  POCT Blood Glucose.: 344 mg/dL (19 Mar 2023 00:29)  POCT Blood Glucose.: 496 mg/dL (18 Mar 2023 21:52)  Beta Hydroxy-Butyrate: 0.2 mmoL/L (23 @ 22:33)      Culture - Urine (collected 23 @ 23:01)  Source: Suprapubic Suprapubic  Preliminary Report (23 @ 10:20):    Culture in progress        Assessment:     Recommendations:     HPI: This is a 43 year old woman with PMH of poorly controlled type 2 DM on insulin (outpt endo Dr. Nisha Joseph, last seen 2023), abdominal infection c/b necrotizing fasciitis, s/p suprapubic catheter, ostomy bag placement, admitted for complicated UTI for whom endocrine is consulted for hyperglycemia.     Pt had been having night sweats and fatigue at home. CT abd pelvis showed diffuse bladder inflammation suggesting cystitis. Currently being treated for sepsis due to UTI.     On admission, glucose was 575 on BMP with bicarb 21, AG 17 (wnl). Received 5u Regular insulin IV at 0:00 on 3/19/23, correctional lispro 8u at 7am, 30u prandial lispro at 9am.     T2DM history:  -Dx in .   -: started metformin 1000 mg BID.   -: was pregnant and started on glyburide- did not require insulin for pregnancy.   - 2020: began seeing Dr. Joseph. Eventually, insulin regimen changed to U500  -last recorded outpatient regimen (clinic note 2023): U500 100 units TID with meals. If glucose levels do not improve, add Admelog 30 units TID with meals    ROS:  -Constitutional: no weight change, no f/c/sweats, no fatigue. no insomnia. no heat/cold intolerance.  -Skin: No change. no hair changes. no bruisability.  -Eye:  no vision changes, no peripheral vision loss. No blurriness.  -Neck: no neck enlargement, dysphagia, odynophagia.   -CV: no cp, no palpitations, no change in exercise tolerance, no daytime somnolence  -Pulm: no SOB  -GI: no n/v/c/d/abd pain  -: no polyuria/polydipsia. no nocturia.   -MSK: no LE edema, no proximal muscle weakness, no hx fractures  -Neuro: no tremors. No dizziness, lightheadedness.   -10 point ROS otherwise negative    Past Medical History:  Essential hypertension    Hyperlipidemia    Diabetes    Obesity    Hernia    Abdominal hernia    Pre-eclampsia        Past Surgical history:  H/O LEEP    History of D&amp;C    H/O:     H/O ventral hernia repair    H/O exploratory laparotomy        Family History:  No pertinent family history in first degree relatives    FH: diabetes mellitus    FH: hypertension        Allergies: amoxicillin (Unknown)  Bactrim I.V. (Rash (Mod to Severe))  clindamycin (Pruritus)  iodine containing compounds (Hives; Anaphylaxis)  penicillin (Hives)  shellfish. (Hives; Anaphylaxis)      Home Medications:  Admelo unit(s) injectable 3 times a day (before meals)  HumuLIN N KwikPen: 200 unit(s) subcutaneous 3 times  labetalol 100 mg oral tablet: 1 dose(s) orally once a day    Inpatient Medications:  dextrose 5%. 1000 milliLiter(s) IV Continuous <Continuous>  dextrose 5%. 1000 milliLiter(s) IV Continuous <Continuous>  dextrose 50% Injectable 25 Gram(s) IV Push once  dextrose 50% Injectable 12.5 Gram(s) IV Push once  dextrose 50% Injectable 25 Gram(s) IV Push once  dextrose Oral Gel 15 Gram(s) Oral once PRN  enoxaparin Injectable 40 milliGRAM(s) SubCutaneous every 24 hours  glucagon  Injectable 1 milliGRAM(s) IntraMuscular once  HYDROmorphone  Injectable 0.5 milliGRAM(s) IV Push every 4 hours PRN  insulin glargine Injectable (LANTUS) 50 Unit(s) SubCutaneous at bedtime  insulin lispro (ADMELOG) corrective regimen sliding scale   SubCutaneous three times a day before meals  insulin lispro Injectable (ADMELOG) 30 Unit(s) SubCutaneous three times a day before meals  piperacillin/tazobactam IVPB.. 4.5 Gram(s) IV Intermittent every 8 hours  sodium chloride 0.9%. 1000 milliLiter(s) IV Continuous <Continuous>    Exam:  Vitals:  T(C): 37.1 (23 @ 08:24), Max: 37.5 (23 @ 21:56)  HR: 94 (23 @ 08:24) (94 - 123)  BP: 142/85 (23 @ 08:24) (125/81 - 148/91)  ABP: --  RR: 18 (23 @ 08:24) (18 - 18)  SpO2: 97% (23 @ 08:24) (97% - 98%)  Height (cm): 170.2 (23 @ 21:56)  Weight (kg): 99.8 (23 @ 21:56)  BMI (kg/m2): 34.5 (23 @ 21:56)  BSA (m2): 2.11 (23 @ 21:56)    23 @ 07:01  -  23 @ 10:58  --------------------------------------------------------  IN: 0 mL / OUT: 300 mL / NET: -300 mL      GEN: well appearing, NAD, intact mental status, A&O  SKIN: no skin hyperpigmentation  HEENT: EOMI, no proptosis, no lid lag  NECK: no thyromegaly, no nodules.   CV: RRR  LUNGS: CTAB  ABD: Soft, nontender, nondistended. Normoactive bowel sounds  NEURO: No tremors. Normoactive reflexes  EXT no edema  PSYCH: normal mood, affect    Labs:      134<L>  |  99  |  8   ----------------------------<  378<H>  4.1   |  23  |  0.63    Ca    8.5      19 Mar 2023 07:43  Mg     2.0         TPro  8.3  /  Alb  3.9  /  TBili  0.2  /  DBili  x   /  AST  15  /  ALT  8<L>  /  AlkPhos  182<H>                          11.3   10.92 )-----------( 390      ( 19 Mar 2023 07:43 )             37.5   LIVER FUNCTIONS - ( 18 Mar 2023 22:33 )  Alb: 3.9 g/dL / Pro: 8.3 g/dL / ALK PHOS: 182 U/L / ALT: 8 U/L / AST: 15 U/L / GGT: x         CAPILLARY BLOOD GLUCOSE      POCT Blood Glucose.: 338 mg/dL (19 Mar 2023 06:47)  POCT Blood Glucose.: 205 mg/dL (19 Mar 2023 01:18)  POCT Blood Glucose.: 344 mg/dL (19 Mar 2023 00:29)  POCT Blood Glucose.: 496 mg/dL (18 Mar 2023 21:52)  Beta Hydroxy-Butyrate: 0.2 mmoL/L (23 @ 22:33)      Culture - Urine (collected 23 @ 23:01)  Source: Suprapubic Suprapubic  Preliminary Report (23 @ 10:20):    Culture in progress        Assessment:     Recommendations:     HPI: This is a 43 year old woman with PMH of poorly controlled type 2 DM on insulin (outpt endo Dr. Nisha Joseph, last seen 2023), abdominal infection c/b necrotizing fasciitis, s/p suprapubic catheter, ostomy bag placement, admitted for complicated UTI for whom endocrine is consulted for hyperglycemia.     Pt had been having night sweats and fatigue at home. Also had nausea and daily vomiting - she initially thought it was excessive phlegm production from an asthma exacerbation. After admission, CT abd pelvis showed diffuse bladder inflammation suggesting cystitis. Currently being treated for sepsis due to UTI.     On admission, glucose was 575 on BMP with bicarb 21, AG 17 (wnl). Received 5u Regular insulin IV at 0:00 on 3/19/23, correctional lispro 8u at 7am, 30u prandial lispro at 9am - patient ate eggs, milk, sausage and a fruit cup). FSG decreased from 338 premeal to 127 postmeal.     T2DM history:  -Dx in .   -: started metformin 1000 mg BID.   -: was pregnant and started on glyburide- did not require insulin for pregnancy.   - 2020: began seeing Dr. Joseph. Eventually, insulin regimen changed to U500  -During a previous admission in 2021, she was receiving Lantus 75u twice daily and Lispro 40-65 units with each meal.   -last recorded recommended outpatient regimen (clinic note 2023): U500 Humulin R 100 units TID with meals. If glucose levels do not improve, add Admelog 30 units TID with meals. Prior to admission.     Prior to admission the patient says she was taking U 500 Humulin-R 160 units 3x a day and Admelog 35 units before meals. She says that FSGs were in the 400s-500s at home. Last dose of U 500 -Humulin R (regular insulin) was around 4pm on 3/18/23 - 160 units.     ROS:  -Constitutional: has night sweats  -Skin: No change. no hair changes. no bruisability.  -Eye:  no vision changes, no peripheral vision loss. No blurriness.  -Neck: no neck enlargement, dysphagia, odynophagia.   -CV: no cp, no palpitations  -Pulm: occasionally wheezing and SOB from asthma  -GI: had nausea and vomiting  -: has suprapubic catheter but sometimes has painful urination from urethra  -MSK: no LE edema, no proximal muscle weakness  -Neuro: no tremors. No dizziness, lightheadedness.   -10 point ROS otherwise negative    Past Medical History:  Essential hypertension    Hyperlipidemia    Diabetes    Obesity    Hernia    Abdominal hernia    Pre-eclampsia        Past Surgical history:  H/O LEEP    History of D&amp;C    H/O:     H/O ventral hernia repair    H/O exploratory laparotomy        Family History:  No pertinent family history in first degree relatives    FH: diabetes mellitus    FH: hypertension        Allergies: amoxicillin (Unknown)  Bactrim I.V. (Rash (Mod to Severe))  clindamycin (Pruritus)  iodine containing compounds (Hives; Anaphylaxis)  penicillin (Hives)  shellfish. (Hives; Anaphylaxis)      Home Medications:  Admelo unit(s) injectable 3 times a day (before meals)  HumuLIN N KwikPen: 200 unit(s) subcutaneous 3 times  labetalol 100 mg oral tablet: 1 dose(s) orally once a day    Inpatient Medications:  dextrose 5%. 1000 milliLiter(s) IV Continuous <Continuous>  dextrose 5%. 1000 milliLiter(s) IV Continuous <Continuous>  dextrose 50% Injectable 25 Gram(s) IV Push once  dextrose 50% Injectable 12.5 Gram(s) IV Push once  dextrose 50% Injectable 25 Gram(s) IV Push once  dextrose Oral Gel 15 Gram(s) Oral once PRN  enoxaparin Injectable 40 milliGRAM(s) SubCutaneous every 24 hours  glucagon  Injectable 1 milliGRAM(s) IntraMuscular once  HYDROmorphone  Injectable 0.5 milliGRAM(s) IV Push every 4 hours PRN  insulin glargine Injectable (LANTUS) 50 Unit(s) SubCutaneous at bedtime  insulin lispro (ADMELOG) corrective regimen sliding scale   SubCutaneous three times a day before meals  insulin lispro Injectable (ADMELOG) 30 Unit(s) SubCutaneous three times a day before meals  piperacillin/tazobactam IVPB.. 4.5 Gram(s) IV Intermittent every 8 hours  sodium chloride 0.9%. 1000 milliLiter(s) IV Continuous <Continuous>    Exam:  Vitals:  T(C): 37.1 (23 @ 08:24), Max: 37.5 (23 @ 21:56)  HR: 94 (23 @ 08:24) (94 - 123)  BP: 142/85 (23 @ 08:24) (125/81 - 148/91)  ABP: --  RR: 18 (23 @ 08:24) (18 - 18)  SpO2: 97% (23 @ 08:24) (97% - 98%)  Height (cm): 170.2 (23 @ 21:56)  Weight (kg): 99.8 (23 @ 21:56)  BMI (kg/m2): 34.5 (23 @ 21:56)  BSA (m2): 2.11 (23 @ 21:56)    23 @ 07:01  -  23 @ 10:58  --------------------------------------------------------  IN: 0 mL / OUT: 300 mL / NET: -300 mL      GEN: well appearing, NAD, pleasant woman lying in bed  HEENT: EOMI, no proptosis, no lid lag  CV: RRR  LUNGS: Occasional wheezes anteriorly  ABD: Soft, nontender, nondistended. suprapubic catheter draining cloudy yellow urine with sediment. Ostomy bag filled with air  NEURO: No tremors.  EXT no edema  PSYCH: normal mood, affect    Labs:      134<L>  |  99  |  8   ----------------------------<  378<H>  4.1   |  23  |  0.63    Ca    8.5      19 Mar 2023 07:43  Mg     2.0         TPro  8.3  /  Alb  3.9  /  TBili  0.2  /  DBili  x   /  AST  15  /  ALT  8<L>  /  AlkPhos  182<H>                          11.3   10.92 )-----------( 390      ( 19 Mar 2023 07:43 )             37.5   LIVER FUNCTIONS - ( 18 Mar 2023 22:33 )  Alb: 3.9 g/dL / Pro: 8.3 g/dL / ALK PHOS: 182 U/L / ALT: 8 U/L / AST: 15 U/L / GGT: x         CAPILLARY BLOOD GLUCOSE      POCT Blood Glucose.: 338 mg/dL (19 Mar 2023 06:47)  POCT Blood Glucose.: 205 mg/dL (19 Mar 2023 01:18)  POCT Blood Glucose.: 344 mg/dL (19 Mar 2023 00:29)  POCT Blood Glucose.: 496 mg/dL (18 Mar 2023 21:52)  Beta Hydroxy-Butyrate: 0.2 mmoL/L (23 @ 22:33)      Culture - Urine (collected 23 @ 23:01)  Source: Suprapubic Suprapubic  Preliminary Report (23 @ 10:20):    Culture in progress    Assessment: This is a 43 year old woman with PMH of poorly controlled type 2 DM on insulin (outpt endo Dr. Nisha Joseph, last seen 2023), abdominal infection c/b necrotizing fasciitis, s/p suprapubic catheter, ostomy bag placement, admitted for complicated UTI for whom endocrine is consulted for hyperglycemia.     At home she appears to be very insulin resistant - was taking Humulin-R U500 160 units three times a day and Admelog 35 units with each meal. On a previous admission required lantus 75u bid and Lispro up to 65 units with meals. However, her appetite is less than her usual right now due to infection and she will likely need less than her home doses.    Recommendations:  -Please decrease Lispro before meals to 20 units.  -Please write a customized order for Lispro insulin correctional scale as follows (FSG = Fingerstick glucose) to be given before meals:   -200, give 3 units  -250, give 6 units  -300, give 9 units  -350, give 12 units  -400, give 15 units  -450, give 18 units   and above, give 21 units   -Please give a dose of Lantus 40 units now - per the worklist manager she still has not gotten it as of 1:57pm. If this is given before 10pm, please do NOT give the dose of Lantus 50 units ordered for 10pm. Ie - from now until 9am on 3/20/23 she should only get ONE dose of Lantus 40 units. Let's observe how she responds before adding on more Lantus.    Endocrine team will continue to follow.     Laverne Coyle MD  Endocrinology Attending

## 2023-03-19 NOTE — H&P ADULT - PROBLEM SELECTOR PLAN 2
A1c of 10 on previous admission   Home med: Metformin 1g BID  Hyperglycemic oh admission with FS>500, downtrended afer fluids and Insulin s/q   - c/w mISS as of now   - FS q4-q6 for now  - f/u A1c and also suprapubic catheter in 11/11/21 at [facility] due to abdominal necrotizing fascitis   - Ostomy care and wound care education   - Patient is interested in having care and here at [facility] and establishing follow up with surgery here for reversal as she told she was informed about the reversal from her surgery team at other hospital but was lost to follow up   - Would benefit from surgery and wound care [place]

## 2023-03-19 NOTE — H&P ADULT - PROBLEM SELECTOR PLAN 5
BMI> 35  - Patient uses walker for mobility so cant exercise and would benefit from Nutrition consult

## 2023-03-19 NOTE — H&P ADULT - HISTORY OF PRESENT ILLNESS
HASMUKH AGUILERA [id number] is a 43y yo Female  with PMH of   , who presents to the ED with       Denies fever, chills, chest pain, palpitations, SOB, cough, abdominal pain, nausea, vomiting, diarrhea, constipation, urinary frequency, urgency, or dysuria, headaches, changes in vision, dizziness, numbness, tingling.  Denies recent travel, recent antibiotic use, or sick contacts.      ED vitals: T , HR , BP , RR , O2 % on RA  ED labs:   ED studies:  ED Interventions:      HASMUKH AGUILERA [id number] is a 43y yo Female  with PMH of   , who presents to the ED with       Denies fever, chills, chest pain, palpitations, SOB, cough, abdominal pain, nausea, vomiting, diarrhea, constipation, urinary frequency, urgency, or dysuria, headaches, changes in vision, dizziness, numbness, tingling.  Denies recent travel, recent antibiotic use, or sick contacts.      ED vitals: T 99.5F , /min , /93 , RR 16 , 98 O2 % on RA  ED studies: CT Abdomen and Pelvis w/ Oral Cont and w/ IV Cont 4 cm right ovarian cyst.Pigtail suprapubic cystostomy catheter in place in the urinary  bladder. There is diffuse urinary bladder inflammation suggesting cystitis.  ED Interventions: cefepime 2g IV, Flagyl 500mg, morphine 4mg, NS 2L and Insulin lispro 5u      HASMUKH AGUILERA [id number] is a 43y yo Female  with PMH of HTN, HLD, Abdominal infection c/b Necrotizing fascitis (s/p suprapubic catheter and ostomy bag placement on 11/11/21 at [facility]) , who presents to the ED with concerns of fatigue and episodes of night sweats for last few weeks. Patient stated that she has been recently having concerns       Denies fever, chills, chest pain, palpitations, SOB, cough, abdominal pain, nausea, vomiting, diarrhea, constipation, urinary frequency, urgency, or dysuria, headaches, changes in vision, dizziness, numbness, tingling.  Denies recent travel, recent antibiotic use, or sick contacts.      ED vitals: T 99.5F , /min , /93 , RR 16 , 98 O2 % on RA  ED studies: CT Abdomen and Pelvis w/ Oral Cont and w/ IV Cont 4 cm right ovarian cyst.Pigtail suprapubic cystostomy catheter in place in the urinary  bladder. There is diffuse urinary bladder inflammation suggesting cystitis.  ED Interventions: cefepime 2g IV, Flagyl 500mg, morphine 4mg, NS 2L and Insulin lispro 5u      HASMUKH AGUILERA [id number] is a 43y yo Female  with PMH of HTN, HLD, Abdominal infection c/b Necrotizing fascitis (s/p suprapubic catheter and ostomy bag placement on 11/11/21 at [facility]) , who presents to the ED with concerns of fatigue and episodes of night sweats for last few weeks. Patient stated that she has been recently having concerns that she has been difficulty taking care of her ostomy bag and concerns for ostomy reversal and was hoping to get supplies and cares from the hospital. Patient Denies fever, chills, chest pain, palpitations, SOB, cough, abdominal pain, nausea, vomiting, diarrhea, constipation, urinary frequency, urgency, or dysuria, headaches, changes in vision, dizziness, numbness, tingling.  Denies recent travel, recent antibiotic use, or sick contacts.      ED vitals: T 99.5F , /min , /93 , RR 16 , 98 O2 % on RA  ED studies: CT Abdomen and Pelvis w/ Oral Cont and w/ IV Cont 4 cm right ovarian cyst.Pigtail suprapubic cystostomy catheter in place in the urinary  bladder. There is diffuse urinary bladder inflammation suggesting cystitis.  ED Interventions: cefepime 2g IV, Flagyl 500mg, morphine 4mg, NS 2L and Insulin lispro 5u

## 2023-03-19 NOTE — PATIENT PROFILE ADULT - FALL HARM RISK - HARM RISK INTERVENTIONS

## 2023-03-19 NOTE — H&P ADULT - ASSESSMENT
HASMUKH AGUILERA [id number] is a 43y yo Female  with PMH of HTN, HLD, Abdominal infection c/b Necrotizing fascitis (s/p suprapubic catheter and ostomy bag placement on 11/11/21 at [facility]) , who presents to the ED with concerns of fatigue and episodes of night sweats for last few weeks. Patient is being admitted to [facility] for further management of her Urosepsis and Ostomy care.  HASMUKH AGUILERA [id number] is a 43y yo Female  with PMH of HTN, HLD, Abdominal infection c/b Necrotizing fascitis (s/p suprapubic catheter and ostomy bag placement on 11/11/21 at [facility]) , who presents to the ED with concerns of fatigue and episodes of night sweats for last few weeks. Patient is being admitted to [facility] for further management of severe sepsis and Ostomy care.

## 2023-03-20 LAB
A1C WITH ESTIMATED AVERAGE GLUCOSE RESULT: 12.5 % — HIGH (ref 4–5.6)
ALBUMIN SERPL ELPH-MCNC: 3 G/DL — LOW (ref 3.3–5)
ALP SERPL-CCNC: 129 U/L — HIGH (ref 40–120)
ALT FLD-CCNC: 8 U/L — LOW (ref 10–45)
ANION GAP SERPL CALC-SCNC: 12 MMOL/L — SIGNIFICANT CHANGE UP (ref 5–17)
APTT BLD: 25.1 SEC — LOW (ref 27.5–35.5)
AST SERPL-CCNC: 13 U/L — SIGNIFICANT CHANGE UP (ref 10–40)
BASOPHILS # BLD AUTO: 0.03 K/UL — SIGNIFICANT CHANGE UP (ref 0–0.2)
BASOPHILS NFR BLD AUTO: 0.4 % — SIGNIFICANT CHANGE UP (ref 0–2)
BILIRUB SERPL-MCNC: 0.2 MG/DL — SIGNIFICANT CHANGE UP (ref 0.2–1.2)
BUN SERPL-MCNC: 11 MG/DL — SIGNIFICANT CHANGE UP (ref 7–23)
CALCIUM SERPL-MCNC: 8.5 MG/DL — SIGNIFICANT CHANGE UP (ref 8.4–10.5)
CHLORIDE SERPL-SCNC: 102 MMOL/L — SIGNIFICANT CHANGE UP (ref 96–108)
CO2 SERPL-SCNC: 22 MMOL/L — SIGNIFICANT CHANGE UP (ref 22–31)
CREAT SERPL-MCNC: 0.65 MG/DL — SIGNIFICANT CHANGE UP (ref 0.5–1.3)
EGFR: 112 ML/MIN/1.73M2 — SIGNIFICANT CHANGE UP
EOSINOPHIL # BLD AUTO: 0.19 K/UL — SIGNIFICANT CHANGE UP (ref 0–0.5)
EOSINOPHIL NFR BLD AUTO: 2.6 % — SIGNIFICANT CHANGE UP (ref 0–6)
ESTIMATED AVERAGE GLUCOSE: 312 MG/DL — HIGH (ref 68–114)
GLUCOSE BLDC GLUCOMTR-MCNC: 190 MG/DL — HIGH (ref 70–99)
GLUCOSE BLDC GLUCOMTR-MCNC: 227 MG/DL — HIGH (ref 70–99)
GLUCOSE BLDC GLUCOMTR-MCNC: 277 MG/DL — HIGH (ref 70–99)
GLUCOSE BLDC GLUCOMTR-MCNC: 297 MG/DL — HIGH (ref 70–99)
GLUCOSE BLDC GLUCOMTR-MCNC: 306 MG/DL — HIGH (ref 70–99)
GLUCOSE BLDC GLUCOMTR-MCNC: 369 MG/DL — HIGH (ref 70–99)
GLUCOSE SERPL-MCNC: 312 MG/DL — HIGH (ref 70–99)
HCT VFR BLD CALC: 38.4 % — SIGNIFICANT CHANGE UP (ref 34.5–45)
HGB BLD-MCNC: 11.4 G/DL — LOW (ref 11.5–15.5)
IMM GRANULOCYTES NFR BLD AUTO: 0.4 % — SIGNIFICANT CHANGE UP (ref 0–0.9)
INR BLD: 1.1 — SIGNIFICANT CHANGE UP (ref 0.88–1.16)
LYMPHOCYTES # BLD AUTO: 1.86 K/UL — SIGNIFICANT CHANGE UP (ref 1–3.3)
LYMPHOCYTES # BLD AUTO: 25.8 % — SIGNIFICANT CHANGE UP (ref 13–44)
MAGNESIUM SERPL-MCNC: 1.8 MG/DL — SIGNIFICANT CHANGE UP (ref 1.6–2.6)
MCHC RBC-ENTMCNC: 24.3 PG — LOW (ref 27–34)
MCHC RBC-ENTMCNC: 29.7 GM/DL — LOW (ref 32–36)
MCV RBC AUTO: 81.9 FL — SIGNIFICANT CHANGE UP (ref 80–100)
MONOCYTES # BLD AUTO: 0.42 K/UL — SIGNIFICANT CHANGE UP (ref 0–0.9)
MONOCYTES NFR BLD AUTO: 5.8 % — SIGNIFICANT CHANGE UP (ref 2–14)
NEUTROPHILS # BLD AUTO: 4.69 K/UL — SIGNIFICANT CHANGE UP (ref 1.8–7.4)
NEUTROPHILS NFR BLD AUTO: 65 % — SIGNIFICANT CHANGE UP (ref 43–77)
NRBC # BLD: 0 /100 WBCS — SIGNIFICANT CHANGE UP (ref 0–0)
PHOSPHATE SERPL-MCNC: 3.6 MG/DL — SIGNIFICANT CHANGE UP (ref 2.5–4.5)
PLATELET # BLD AUTO: 369 K/UL — SIGNIFICANT CHANGE UP (ref 150–400)
POTASSIUM SERPL-MCNC: 4.2 MMOL/L — SIGNIFICANT CHANGE UP (ref 3.5–5.3)
POTASSIUM SERPL-SCNC: 4.2 MMOL/L — SIGNIFICANT CHANGE UP (ref 3.5–5.3)
PROT SERPL-MCNC: 6.3 G/DL — SIGNIFICANT CHANGE UP (ref 6–8.3)
PROTHROM AB SERPL-ACNC: 13.1 SEC — SIGNIFICANT CHANGE UP (ref 10.5–13.4)
RBC # BLD: 4.69 M/UL — SIGNIFICANT CHANGE UP (ref 3.8–5.2)
RBC # FLD: 14.6 % — HIGH (ref 10.3–14.5)
SODIUM SERPL-SCNC: 136 MMOL/L — SIGNIFICANT CHANGE UP (ref 135–145)
WBC # BLD: 7.22 K/UL — SIGNIFICANT CHANGE UP (ref 3.8–10.5)
WBC # FLD AUTO: 7.22 K/UL — SIGNIFICANT CHANGE UP (ref 3.8–10.5)

## 2023-03-20 PROCEDURE — 99232 SBSQ HOSP IP/OBS MODERATE 35: CPT

## 2023-03-20 PROCEDURE — 99232 SBSQ HOSP IP/OBS MODERATE 35: CPT | Mod: GC

## 2023-03-20 RX ORDER — ACETAMINOPHEN 500 MG
1000 TABLET ORAL ONCE
Refills: 0 | Status: COMPLETED | OUTPATIENT
Start: 2023-03-20 | End: 2023-03-20

## 2023-03-20 RX ORDER — BENZOCAINE AND MENTHOL 5; 1 G/100ML; G/100ML
1 LIQUID ORAL EVERY 8 HOURS
Refills: 0 | Status: DISCONTINUED | OUTPATIENT
Start: 2023-03-20 | End: 2023-03-27

## 2023-03-20 RX ORDER — HYDROMORPHONE HYDROCHLORIDE 2 MG/ML
0.5 INJECTION INTRAMUSCULAR; INTRAVENOUS; SUBCUTANEOUS ONCE
Refills: 0 | Status: DISCONTINUED | OUTPATIENT
Start: 2023-03-20 | End: 2023-03-20

## 2023-03-20 RX ORDER — INSULIN LISPRO 100/ML
20 VIAL (ML) SUBCUTANEOUS ONCE
Refills: 0 | Status: DISCONTINUED | OUTPATIENT
Start: 2023-03-20 | End: 2023-03-20

## 2023-03-20 RX ORDER — INSULIN GLARGINE 100 [IU]/ML
50 INJECTION, SOLUTION SUBCUTANEOUS ONCE
Refills: 0 | Status: COMPLETED | OUTPATIENT
Start: 2023-03-20 | End: 2023-03-20

## 2023-03-20 RX ORDER — IPRATROPIUM/ALBUTEROL SULFATE 18-103MCG
3 AEROSOL WITH ADAPTER (GRAM) INHALATION ONCE
Refills: 0 | Status: COMPLETED | OUTPATIENT
Start: 2023-03-20 | End: 2023-03-20

## 2023-03-20 RX ORDER — INSULIN LISPRO 100/ML
30 VIAL (ML) SUBCUTANEOUS
Refills: 0 | Status: DISCONTINUED | OUTPATIENT
Start: 2023-03-20 | End: 2023-03-21

## 2023-03-20 RX ORDER — ACETAMINOPHEN 500 MG
975 TABLET ORAL EVERY 8 HOURS
Refills: 0 | Status: DISCONTINUED | OUTPATIENT
Start: 2023-03-20 | End: 2023-03-27

## 2023-03-20 RX ORDER — OXYBUTYNIN CHLORIDE 5 MG
5 TABLET ORAL EVERY 8 HOURS
Refills: 0 | Status: DISCONTINUED | OUTPATIENT
Start: 2023-03-20 | End: 2023-03-27

## 2023-03-20 RX ORDER — LOSARTAN POTASSIUM 100 MG/1
50 TABLET, FILM COATED ORAL DAILY
Refills: 0 | Status: DISCONTINUED | OUTPATIENT
Start: 2023-03-20 | End: 2023-03-27

## 2023-03-20 RX ORDER — INSULIN LISPRO 100/ML
VIAL (ML) SUBCUTANEOUS
Refills: 0 | Status: DISCONTINUED | OUTPATIENT
Start: 2023-03-20 | End: 2023-03-21

## 2023-03-20 RX ORDER — KETOROLAC TROMETHAMINE 30 MG/ML
30 SYRINGE (ML) INJECTION EVERY 8 HOURS
Refills: 0 | Status: DISCONTINUED | OUTPATIENT
Start: 2023-03-20 | End: 2023-03-20

## 2023-03-20 RX ORDER — INSULIN GLARGINE 100 [IU]/ML
20 INJECTION, SOLUTION SUBCUTANEOUS AT BEDTIME
Refills: 0 | Status: DISCONTINUED | OUTPATIENT
Start: 2023-03-20 | End: 2023-03-21

## 2023-03-20 RX ORDER — KETOROLAC TROMETHAMINE 30 MG/ML
15 SYRINGE (ML) INJECTION EVERY 8 HOURS
Refills: 0 | Status: DISCONTINUED | OUTPATIENT
Start: 2023-03-20 | End: 2023-03-25

## 2023-03-20 RX ORDER — OXYBUTYNIN CHLORIDE 5 MG
5 TABLET ORAL DAILY
Refills: 0 | Status: DISCONTINUED | OUTPATIENT
Start: 2023-03-20 | End: 2023-03-20

## 2023-03-20 RX ORDER — BUDESONIDE AND FORMOTEROL FUMARATE DIHYDRATE 160; 4.5 UG/1; UG/1
2 AEROSOL RESPIRATORY (INHALATION)
Refills: 0 | Status: DISCONTINUED | OUTPATIENT
Start: 2023-03-20 | End: 2023-03-21

## 2023-03-20 RX ORDER — HYDROMORPHONE HYDROCHLORIDE 2 MG/ML
1 INJECTION INTRAMUSCULAR; INTRAVENOUS; SUBCUTANEOUS EVERY 4 HOURS
Refills: 0 | Status: DISCONTINUED | OUTPATIENT
Start: 2023-03-21 | End: 2023-03-22

## 2023-03-20 RX ADMIN — Medication 3 MILLILITER(S): at 08:48

## 2023-03-20 RX ADMIN — Medication 15 MILLIGRAM(S): at 08:48

## 2023-03-20 RX ADMIN — HYDROMORPHONE HYDROCHLORIDE 0.5 MILLIGRAM(S): 2 INJECTION INTRAMUSCULAR; INTRAVENOUS; SUBCUTANEOUS at 00:52

## 2023-03-20 RX ADMIN — Medication 5 MILLIGRAM(S): at 23:53

## 2023-03-20 RX ADMIN — Medication 30 UNIT(S): at 18:05

## 2023-03-20 RX ADMIN — Medication 6: at 11:38

## 2023-03-20 RX ADMIN — HYDROMORPHONE HYDROCHLORIDE 0.5 MILLIGRAM(S): 2 INJECTION INTRAMUSCULAR; INTRAVENOUS; SUBCUTANEOUS at 12:26

## 2023-03-20 RX ADMIN — Medication 3 MILLILITER(S): at 11:39

## 2023-03-20 RX ADMIN — INSULIN GLARGINE 20 UNIT(S): 100 INJECTION, SOLUTION SUBCUTANEOUS at 22:36

## 2023-03-20 RX ADMIN — INSULIN GLARGINE 50 UNIT(S): 100 INJECTION, SOLUTION SUBCUTANEOUS at 13:25

## 2023-03-20 RX ADMIN — Medication 5 MILLIGRAM(S): at 13:31

## 2023-03-20 RX ADMIN — HYDROMORPHONE HYDROCHLORIDE 0.5 MILLIGRAM(S): 2 INJECTION INTRAMUSCULAR; INTRAVENOUS; SUBCUTANEOUS at 12:11

## 2023-03-20 RX ADMIN — PIPERACILLIN AND TAZOBACTAM 25 GRAM(S): 4; .5 INJECTION, POWDER, LYOPHILIZED, FOR SOLUTION INTRAVENOUS at 18:04

## 2023-03-20 RX ADMIN — HYDROMORPHONE HYDROCHLORIDE 0.5 MILLIGRAM(S): 2 INJECTION INTRAMUSCULAR; INTRAVENOUS; SUBCUTANEOUS at 05:23

## 2023-03-20 RX ADMIN — Medication 15 MILLIGRAM(S): at 09:03

## 2023-03-20 RX ADMIN — Medication 3: at 22:36

## 2023-03-20 RX ADMIN — HYDROMORPHONE HYDROCHLORIDE 0.5 MILLIGRAM(S): 2 INJECTION INTRAMUSCULAR; INTRAVENOUS; SUBCUTANEOUS at 21:46

## 2023-03-20 RX ADMIN — HYDROMORPHONE HYDROCHLORIDE 0.5 MILLIGRAM(S): 2 INJECTION INTRAMUSCULAR; INTRAVENOUS; SUBCUTANEOUS at 00:37

## 2023-03-20 RX ADMIN — LOSARTAN POTASSIUM 50 MILLIGRAM(S): 100 TABLET, FILM COATED ORAL at 18:04

## 2023-03-20 RX ADMIN — Medication 3 MILLILITER(S): at 01:09

## 2023-03-20 RX ADMIN — Medication 400 MILLIGRAM(S): at 23:54

## 2023-03-20 RX ADMIN — Medication 12: at 16:39

## 2023-03-20 RX ADMIN — PIPERACILLIN AND TAZOBACTAM 25 GRAM(S): 4; .5 INJECTION, POWDER, LYOPHILIZED, FOR SOLUTION INTRAVENOUS at 09:50

## 2023-03-20 RX ADMIN — Medication 3 MILLILITER(S): at 21:37

## 2023-03-20 RX ADMIN — Medication 15 MILLIGRAM(S): at 16:39

## 2023-03-20 RX ADMIN — PIPERACILLIN AND TAZOBACTAM 25 GRAM(S): 4; .5 INJECTION, POWDER, LYOPHILIZED, FOR SOLUTION INTRAVENOUS at 01:01

## 2023-03-20 RX ADMIN — HYDROMORPHONE HYDROCHLORIDE 0.5 MILLIGRAM(S): 2 INJECTION INTRAMUSCULAR; INTRAVENOUS; SUBCUTANEOUS at 21:31

## 2023-03-20 RX ADMIN — HYDROMORPHONE HYDROCHLORIDE 0.5 MILLIGRAM(S): 2 INJECTION INTRAMUSCULAR; INTRAVENOUS; SUBCUTANEOUS at 05:29

## 2023-03-20 RX ADMIN — Medication 9: at 06:46

## 2023-03-20 NOTE — PROGRESS NOTE ADULT - PROBLEM SELECTOR PLAN 1
2/2 complicated urinary tract infection   SIRS Positive for tachycardia and WBC +lactic acidosis >5 with suspected source of infection   - c/w zosyn due to multiple hospitalization and concern for broader coverage( Patient denied any anaphylactic reaction from penicillin)   - urine cx growing klebsiella and enterococcus faecalis; susceptibilities pending   - WBC downtrending and no fevers  - IR consult for suprapubic cath exchange

## 2023-03-20 NOTE — DIETITIAN INITIAL EVALUATION ADULT - OTHER INFO
43y yo Female  with PMH of HTN, HLD, Abdominal infection c/b Necrotizing fascitis (s/p suprapubic catheter and ostomy bag placement on 11/11/21 at [facility]) , who presents to the ED with concerns of fatigue and episodes of night sweats for last few weeks. Patient is being admitted to [facility] for further management of severe sepsis and Ostomy care.     Pt seen in room for nutrition assessment. Pt reports low appetite PTA and during hospital stay. As per diet recall PTA: juice, Ensure shakes, not many solid foods as pt has been having s/s of N/V. Pt was NPO status on admission, has been advanced today to Consistent Carbohydrate diet w/no snacks, noted with <25% PO intakes since admission r/t NPO status, minimal appetite. No cultural, Jain, or ethnic food preferences noted. NKFA. Denies significant wt changes, reports feeling "swollen". Dosing wt: 220#, IBW: 135#, pt is 163% of IBW. Pt endorsed N/V today (for the past ~1 week), pt has a colostomy, noted with 0mL output in the past 24h. No edema. Skin: R groin wound. Joseph: 18. No issues chewing or swallowing noted. Denies pain. Labs reviewed: elevated POCT BG (297), A1c (12.5), serum Glucose (312), ALP (129), low ALT (8); RD to continue to monitor trends. Nutritionally pertinent medications: IV Abx, IVF (sodium chloride), insulin. RD observed pt with no overt signs of muscle or fat wasting. Based on ASPEN guidelines, pt does not meet criteria for malnutrition at this time. Pt amenable to education; RD provided education in regards to the importance of adequate macro and micronutrients, as well as hydration to support ADLs, maintain energy levels and overall functional/nutritional status. General healthful education provided. Nutrient-dense foods promoted, foods with adequate protein highlighted. Pt was receptive and verbalized understanding. No additional nutrition-related concerns. Will continue to follow per RD protocol. Additional nutrition recommendations below to follow.

## 2023-03-20 NOTE — CONSULT NOTE ADULT - ASSESSMENT
INCOMPLETE 43y yo Female  with PMH of HTN, HLD, Abdominal infection c/b Necrotizing fascitis (s/p suprapubic catheter and ostomy bag placement on 11/11/21 at [facility]) , who presents to the ED with concerns of fatigue and episodes of night sweats for last few weeks. Patient stated that she has been recently having concerns that she has been difficulty taking care of her ostomy bag and concerns for ostomy reversal and was hoping to get supplies and cares from the hospital. On workup CT Abdomen and Pelvis w/ Oral Cont and w/ IV Cont 4 cm right ovarian cyst, Pigtail suprapubic cystostomy catheter in place in the urinary  bladder. There is diffuse urinary bladder inflammation suggesting cystitis.      consulted for dysfunctional SPT. Per pt, she has been followed by Urologist associated with [facility] since SPT was originally placed 11/11/21 (does not recall name of Urologist). She has been getting SPT exchanged every month by IR 2/2 cannot tolerate bedside SPT exchange. Also states she need to be premedicated prior to procedure because she is allergic to contrast. Pt states 2 months ago, SPT was pulled out by doctor at [facility] to see if pt could urinate from urethra. Pt was able to urinate ok, and was sent home. Original SPT site closed and healed without complications. Pt states she preferred SPT 2/2 nonambulatory and has pain with urination so SPT was redone by IR at [facility] but new incision/insertion site was moved to left pubic area below ostomy site. Per pt, SPT was draining fine, but last night when nursing changed ostomy bag, SPT was pulled out. Of note, pt states she would like to establish care with a Urologist at [facility] since her other doctors are here. Since SPT became dislodged last night, pt has been urinating from urethra. Bladder scan 0cc.  + dysuria. Denies fevers, chills, nausea, vomiting, hematuria.     Assessed at bedside. SPT (8fr catheter) appears to be dislodged-stitches are 3 inches distal to insertion site. Unable to flush.  Pt admitted to medicine for urosepsis. Pt is afebrile, hemodynamically stable. Labs today show no leukocytosis, Cr 0.65. UA + nit/ trace leukocyte esterase. UCx 3/18 growing >100K enterococcus and klebsiella. Sensitivities to follow    Plan:  -treatment UTI per primary team  -f/u Ucx sensitivities  -consult IR for dislodged SPT. would pre-medicate for contrast allergy  -final recs pending dicussion with attending   43y yo Female  with PMH of HTN, HLD, Abdominal infection c/b Necrotizing fascitis (s/p suprapubic catheter and ostomy bag placement on 11/11/21 at [facility]) , who presents to the ED with concerns of fatigue and episodes of night sweats for last few weeks. Patient stated that she has been recently having concerns that she has been difficulty taking care of her ostomy bag and concerns for ostomy reversal and was hoping to get supplies and cares from the hospital. On workup CT Abdomen and Pelvis w/ Oral Cont and w/ IV Cont 4 cm right ovarian cyst, Pigtail suprapubic cystostomy catheter in place in the urinary  bladder. There is diffuse urinary bladder inflammation suggesting cystitis.      consulted for dysfunctional SPT. Per pt, she has been followed by Urologist associated with [facility] since SPT was originally placed 11/11/21 (does not recall name of Urologist). She has been getting SPT exchanged every month by IR 2/2 cannot tolerate bedside SPT exchange. Also states she need to be premedicated prior to procedure because she is allergic to contrast. Pt states 2 months ago, SPT was pulled out by doctor at [facility] to see if pt could urinate from urethra. Pt was able to urinate ok, and was sent home. Original SPT site closed and healed without complications. Pt states she preferred SPT 2/2 nonambulatory and has pain with urination so SPT was redone by IR at [facility] but new incision/insertion site was moved to left pubic area below ostomy site. Per pt, SPT was draining fine, but last night when nursing changed ostomy bag, SPT was pulled out. Of note, pt states she would like to establish care with a Urologist at [facility] since her other doctors are here. Since SPT became dislodged last night, pt has been urinating from urethra. Bladder scan 0cc.  + dysuria. Denies fevers, chills, nausea, vomiting, hematuria.     Assessed at bedside. SPT (8fr catheter) appears to be dislodged-stitches are 3 inches distal to insertion site. Unable to flush.  Pt admitted to medicine for urosepsis. Pt is afebrile, hemodynamically stable. Labs today show no leukocytosis, Cr 0.65. UA + nit/ trace leukocyte esterase. UCx 3/18 growing >100K enterococcus and klebsiella. Sensitivities to follow    Plan:  -treatment UTI per primary team  -f/u Ucx sensitivities  -consult IR for dislodged SPT. would pre-medicate for contrast allergy  -can follow up at  clinic:  Please follow up with the Urology Clinic within 1-2 weeks of discharge.  Call [phone] to schedule your appointment.  The office is located at [address] [place] [address], [address] , [place] [address]   43y yo Female  with PMH of HTN, HLD, Abdominal infection c/b Necrotizing fascitis (s/p suprapubic catheter and ostomy bag placement on 11/11/21 at [facility]) , who presents to the ED with concerns of fatigue and episodes of night sweats for last few weeks. Patient stated that she has been recently having concerns that she has been difficulty taking care of her ostomy bag and concerns for ostomy reversal and was hoping to get supplies and cares from the hospital. On workup CT Abdomen and Pelvis w/ Oral Cont and w/ IV Cont 4 cm right ovarian cyst, Pigtail suprapubic cystostomy catheter in place in the urinary  bladder. There is diffuse urinary bladder inflammation suggesting cystitis.      consulted for dysfunctional SPT. Per pt, she has been followed by Urologist associated with [facility] since SPT was originally placed 11/11/21 (does not recall name of Urologist). She has been getting SPT exchanged every month by IR 2/2 cannot tolerate bedside SPT exchange. Also states she need to be premedicated prior to procedure because she is allergic to contrast. Pt states 2 months ago, SPT was pulled out by doctor at [facility] to see if pt could urinate from urethra. Pt was able to urinate ok, and was sent home. Original SPT site closed and healed without complications. Pt states she preferred SPT 2/2 nonambulatory and has pain with urination so SPT was redone by IR at [facility] but new incision/insertion site was moved to left pubic area below ostomy site. Per pt, SPT was draining fine, but last night when nursing changed ostomy bag, SPT was pulled out. Of note, pt states she would like to establish care with a Urologist at [facility] since her other doctors are here. Since SPT became dislodged last night, pt has been urinating from urethra. Bladder scan 0cc.  + dysuria. Denies fevers, chills, nausea, vomiting, hematuria.     Assessed at bedside. SPT (8fr catheter) appears to be dislodged-stitches are 3 inches distal to insertion site. Unable to flush.  Pt admitted to medicine for urosepsis. Pt is afebrile, hemodynamically stable. Labs today show no leukocytosis, Cr 0.65. UA + nit/ trace leukocyte esterase. UCx 3/18 growing >100K enterococcus and klebsiella. Sensitivities to follow    Plan:  -treatment UTI per primary team  -f/u Ucx sensitivities  -consult IR for dislodged SPT. would pre-medicate for contrast allergy  -can follow up at  clinic within 1-2 weeks of discharge.  Call [phone] to schedule your appointment.  The office is located at [address] [place] [address], [address] , [place] [address]

## 2023-03-20 NOTE — DIETITIAN INITIAL EVALUATION ADULT - PERTINENT MEDS FT
MEDICATIONS  (STANDING):  acetaminophen     Tablet .. 975 milliGRAM(s) Oral every 8 hours  budesonide 160 MICROgram(s)/formoterol 4.5 MICROgram(s) Inhaler 2 Puff(s) Inhalation two times a day  dextrose 5%. 1000 milliLiter(s) (100 mL/Hr) IV Continuous <Continuous>  dextrose 5%. 1000 milliLiter(s) (50 mL/Hr) IV Continuous <Continuous>  dextrose 50% Injectable 25 Gram(s) IV Push once  dextrose 50% Injectable 12.5 Gram(s) IV Push once  dextrose 50% Injectable 25 Gram(s) IV Push once  glucagon  Injectable 1 milliGRAM(s) IntraMuscular once  insulin lispro (ADMELOG) corrective regimen sliding scale   SubCutaneous three times a day before meals  insulin lispro Injectable (ADMELOG) 20 Unit(s) SubCutaneous three times a day before meals  ketorolac   Injectable 30 milliGRAM(s) IV Push every 8 hours  losartan 50 milliGRAM(s) Oral daily  oxybutynin 5 milliGRAM(s) Oral every 8 hours  piperacillin/tazobactam IVPB.. 4.5 Gram(s) IV Intermittent every 8 hours  sodium chloride 0.9%. 1000 milliLiter(s) (60 mL/Hr) IV Continuous <Continuous>    MEDICATIONS  (PRN):  albuterol/ipratropium for Nebulization 3 milliLiter(s) Nebulizer every 6 hours PRN Shortness of Breath and/or Wheezing  dextrose Oral Gel 15 Gram(s) Oral once PRN Blood Glucose LESS THAN 70 milliGRAM(s)/deciliter  HYDROmorphone  Injectable 0.5 milliGRAM(s) IV Push every 4 hours PRN Severe Pain (7 - 10)

## 2023-03-20 NOTE — DIETITIAN INITIAL EVALUATION ADULT - PERSON TAUGHT/METHOD
Pt amenable to education; RD provided education in regards to the importance of adequate macro and micronutrients, as well as hydration to support ADLs, maintain energy levels and overall functional/nutritional status. General healthful education provided. Nutrient-dense foods promoted, foods with adequate protein highlighted. Pt was receptive and verbalized understanding./verbal instruction/patient instructed

## 2023-03-20 NOTE — PROGRESS NOTE ADULT - ATTENDING COMMENTS
A/P:  1.sepsis due to complicated UTI (suprapubic catheter) with suprapubic pain  - urine culture +E faecalis +Proteus  - continue zosyn, follow S/S and de-escalate as necessary  - WBC stable  - blood cx NGTD  - CT A/P with bladder wall thickening  - pain control: oxybutynin, dilaudid, toradol 15mg IV q6h    2. IDDM a1c 12 with hyperglycemia  - per endocrine, continue basal bolus and pre meal; FS >300 today. monitor BMP, low threshold to obtain BMP to rule out mild DKA or opening of AG. goal -180. avoid short acting insulin stacking.    3. colostomy care  - CT A/P reviewed, stable colon without inflammation    rest of plan as above . A/P:  1.sepsis due to complicated UTI (suprapubic catheter) with suprapubic pain  - urine culture +E faecalis +Proteus  - continue zosyn, follow S/S and de-escalate as necessary  - WBC stable  - blood cx NGTD  - CT A/P with bladder wall thickening  - pain control: oxybutynin, dilaudid, toradol 15mg IV q6h  - NPO after MN for suprapubic catheter exchange    2. IDDM a1c 12 with hyperglycemia  - per endocrine, continue basal bolus and pre meal; FS >300 today. monitor BMP, low threshold to obtain BMP to rule out mild DKA or opening of AG. goal -180. avoid short acting insulin stacking.    3. colostomy care  - CT A/P reviewed, stable colon without inflammation    rest of plan as above

## 2023-03-20 NOTE — PROGRESS NOTE ADULT - SUBJECTIVE AND OBJECTIVE BOX
INTERVAL HPI/OVERNIGHT EVENTS:  O/N: Patient afebrile with stable vitals overnight. Suprapubic cath leaking at insertion site.   This morning: Patient was seen and examined at bedside. Patient reports episodic suprapubic pain. Suspect component of bladder spasm. Urology evaluated and recommending IR suprapubic catheter exchange.     VITAL SIGNS:  T(F): 97.7 (23 @ 12:48)  HR: 97 (23 @ 12:48)  BP: 133/72 (23 @ 12:48)  RR: 18 (23 @ 12:48)  SpO2: 92% (23 @ 12:48)  Wt(kg): --    PHYSICAL EXAM:    Constitutional: NAD  HEENT: EOMI, sclera non-icteric, neck supple, trachea midline, no masses  Respiratory: Mild bilateral expiratory wheezing. No crackles/rhonchi. No accessory muscle use.   Cardiovascular: RRR, normal S1S2, no M/R/G  Gastrointestinal: abdomen soft. Diffuse tenderness to palpation worst in suprapubic area. LLQ ostomy w/ stool output. Suprapubic cath w/o erythema/fluctuance at site.   Extremities: Warm, well perfused, pulses equal bilateral upper and lower extremities, no edema  Neurological: AAOx3, CN Grossly intact. LLE 2/5 strength in knee flexion. Able to move L toes however limited to no passive ROM in L ankle.   Skin: Normal temperature, warm, dry    MEDICATIONS  (STANDING):  acetaminophen     Tablet .. 975 milliGRAM(s) Oral every 8 hours  budesonide 160 MICROgram(s)/formoterol 4.5 MICROgram(s) Inhaler 2 Puff(s) Inhalation two times a day  dextrose 5%. 1000 milliLiter(s) (100 mL/Hr) IV Continuous <Continuous>  dextrose 5%. 1000 milliLiter(s) (50 mL/Hr) IV Continuous <Continuous>  dextrose 50% Injectable 25 Gram(s) IV Push once  dextrose 50% Injectable 12.5 Gram(s) IV Push once  dextrose 50% Injectable 25 Gram(s) IV Push once  glucagon  Injectable 1 milliGRAM(s) IntraMuscular once  insulin lispro (ADMELOG) corrective regimen sliding scale   SubCutaneous three times a day before meals  insulin lispro Injectable (ADMELOG) 20 Unit(s) SubCutaneous three times a day before meals  ketorolac   Injectable 30 milliGRAM(s) IV Push every 8 hours  oxybutynin 5 milliGRAM(s) Oral every 8 hours  piperacillin/tazobactam IVPB.. 4.5 Gram(s) IV Intermittent every 8 hours  sodium chloride 0.9%. 1000 milliLiter(s) (60 mL/Hr) IV Continuous <Continuous>    MEDICATIONS  (PRN):  albuterol/ipratropium for Nebulization 3 milliLiter(s) Nebulizer every 6 hours PRN Shortness of Breath and/or Wheezing  dextrose Oral Gel 15 Gram(s) Oral once PRN Blood Glucose LESS THAN 70 milliGRAM(s)/deciliter  HYDROmorphone  Injectable 0.5 milliGRAM(s) IV Push every 4 hours PRN Severe Pain (7 - 10)      Allergies    amoxicillin (Unknown)  clindamycin (Pruritus)  fish (Hives; Urticaria)  iodine containing compounds (Hives; Anaphylaxis)  penicillin (Hives)  shellfish. (Hives; Anaphylaxis)    Intolerances    Bactrim I.V. (Rash (Mod to Severe))      LABS:                        11.4   7.22  )-----------( 369      ( 20 Mar 2023 05:30 )             38.4     03-20    136  |  102  |  11  ----------------------------<  312<H>  4.2   |  22  |  0.65    Ca    8.5      20 Mar 2023 05:30  Phos  3.6     03-20  Mg     1.8     03-20    TPro  6.3  /  Alb  3.0<L>  /  TBili  0.2  /  DBili  x   /  AST  13  /  ALT  8<L>  /  AlkPhos  129<H>  03-20    PT/INR - ( 20 Mar 2023 05:30 )   PT: 13.1 sec;   INR: 1.10          PTT - ( 20 Mar 2023 05:30 )  PTT:25.1 sec  Urinalysis Basic - ( 18 Mar 2023 23:03 )    Color: Yellow / Appearance: Clear / S.025 / pH: x  Gluc: x / Ketone: NEGATIVE  / Bili: Negative / Urobili: 0.2 E.U./dL   Blood: x / Protein: 30 mg/dL / Nitrite: POSITIVE   Leuk Esterase: Trace / RBC: > 10 /HPF / WBC > 10 /HPF   Sq Epi: x / Non Sq Epi: 0-5 /HPF / Bacteria: Present /HPF              RADIOLOGY & ADDITIONAL TESTS:  Reviewed

## 2023-03-20 NOTE — DIETITIAN INITIAL EVALUATION ADULT - ETIOLOGY
r/t hypermetabolism secondary to pt's condition  r/t inability to meet nutrient needs secondary to pt's condition

## 2023-03-20 NOTE — DIETITIAN INITIAL EVALUATION ADULT - ADD RECOMMEND
1. Continue with current diet order  >>Recommend Ensure Max Protein ONS BID for additional nutrients (150kcal, 30gPRO per serving)  2. Encourage pt to meet nutritional needs as able  3. Monitor PO intakes, trend weights (weekly), monitor skin integrity, monitor labs (electrolytes, CMP), monitor GI fxn   4. Encourage adherence to diet education (reinforce as able)   5. Continue insulin regimen to promote euglycemia  6. Pain and bowel regimen per team  7. Will continue to assess/honor preferences as able   8. Align nutrition interventions with GOC at all times

## 2023-03-20 NOTE — DIETITIAN INITIAL EVALUATION ADULT - PERTINENT LABORATORY DATA
03-20    136  |  102  |  11  ----------------------------<  312<H>  4.2   |  22  |  0.65    Ca    8.5      20 Mar 2023 05:30  Phos  3.6     03-20  Mg     1.8     03-20    TPro  6.3  /  Alb  3.0<L>  /  TBili  0.2  /  DBili  x   /  AST  13  /  ALT  8<L>  /  AlkPhos  129<H>  03-20  POCT Blood Glucose.: 369 mg/dL (03-20-23 @ 13:20)  A1C with Estimated Average Glucose Result: 12.5 % (03-20-23 @ 05:30)

## 2023-03-20 NOTE — PROGRESS NOTE ADULT - PROBLEM SELECTOR PLAN 3
A1c of 10 on previous admission   Home med: Admelog 35units premeals and Humulin 160u TID   Hyperglycemic oh admission with FS>500, downtrended afer fluids and Insulin s/q   - A1c 12.5   - No acidosis or AG  - Endocrinology consulted; recommending custom ISS  - Lantus 50u qhs   - Admelog 20units pre-meals   - FS q4-q6 for now

## 2023-03-20 NOTE — DIETITIAN INITIAL EVALUATION ADULT - OBTAIN WEEKLY WEIGHT
Lactation Consult    Mom concerned baby not getting enough milk. Reports no breast changes in pregnancy.  No complaints of sore nipples.  Mom difficulty getting out of bed, minimally assisted to side of bed.  Mom changed diaper and then sat in chair.  States she thinks she (mom) has gas.  Two school age nephews in room, no other adult.     Infant to breast in different positions, educating mom about hand position and deep latch as has been done all night with her.  Infant suckles but doesn't stay sustained, slips off.  Cheek dimpling indicating shallow latch.   Moved to left side and breast softer and suck sustained 7 minutes stop and starting, no pain, dimpling persisted. Pain with hand expression, absolutely no drops obtained    Placed skin to skin, call light in reach. Instructed to breastfeed every 2 hours today.  Also encouraged to push crib in [place].     yes

## 2023-03-20 NOTE — DIETITIAN INITIAL EVALUATION ADULT - NSFNSGIIOFT_GEN_A_CORE
03-19-23 @ 07:01  -  03-20-23 @ 07:00  --------------------------------------------------------  OUT:    Colostomy (mL): 0 mL  Total OUT: 0 mL    Total NET: 0 mL      03-20-23 @ 07:01  -  03-20-23 @ 14:25  --------------------------------------------------------  OUT:    Colostomy (mL): 0 mL  Total OUT: 0 mL    Total NET: 0 mL

## 2023-03-20 NOTE — CONSULT NOTE ADULT - SUBJECTIVE AND OBJECTIVE BOX
HPI:  HASMUKH AGUILERA [id number] is a 43y yo Female  with PMH of HTN, HLD, Abdominal infection c/b Necrotizing fascitis (s/p suprapubic catheter and ostomy bag placement on 21 at [facility]) , who presents to the ED with concerns of fatigue and episodes of night sweats for last few weeks. Patient stated that she has been recently having concerns that she has been difficulty taking care of her ostomy bag and concerns for ostomy reversal and was hoping to get supplies and cares from the hospital. On workup CT Abdomen and Pelvis w/ Oral Cont and w/ IV Cont 4 cm right ovarian cyst, Pigtail suprapubic cystostomy catheter in place in the urinary  bladder. There is diffuse urinary bladder inflammation suggesting cystitis. Patient found to have UTI, Urology consulted for catheter exchange.      PAST MEDICAL & SURGICAL HISTORY:  Essential hypertension      Hyperlipidemia      Diabetes      Obesity      Abdominal hernia      Pre-eclampsia      H/O LEEP      History of D&amp;C      H/O:       H/O ventral hernia repair      H/O exploratory laparotomy          MEDICATIONS  (STANDING):  acetaminophen     Tablet .. 975 milliGRAM(s) Oral every 8 hours  albuterol/ipratropium for Nebulization 3 milliLiter(s) Nebulizer once  budesonide 160 MICROgram(s)/formoterol 4.5 MICROgram(s) Inhaler 2 Puff(s) Inhalation two times a day  dextrose 5%. 1000 milliLiter(s) (100 mL/Hr) IV Continuous <Continuous>  dextrose 5%. 1000 milliLiter(s) (50 mL/Hr) IV Continuous <Continuous>  dextrose 50% Injectable 25 Gram(s) IV Push once  dextrose 50% Injectable 12.5 Gram(s) IV Push once  dextrose 50% Injectable 25 Gram(s) IV Push once  glucagon  Injectable 1 milliGRAM(s) IntraMuscular once  insulin glargine Injectable (LANTUS) 50 Unit(s) SubCutaneous once  insulin lispro (ADMELOG) corrective regimen sliding scale   SubCutaneous three times a day before meals  insulin lispro Injectable (ADMELOG) 20 Unit(s) SubCutaneous three times a day before meals  ketorolac   Injectable 30 milliGRAM(s) IV Push every 8 hours  oxybutynin 5 milliGRAM(s) Oral every 8 hours  piperacillin/tazobactam IVPB.. 4.5 Gram(s) IV Intermittent every 8 hours  sodium chloride 0.9%. 1000 milliLiter(s) (60 mL/Hr) IV Continuous <Continuous>    MEDICATIONS  (PRN):  albuterol/ipratropium for Nebulization 3 milliLiter(s) Nebulizer every 6 hours PRN Shortness of Breath and/or Wheezing  dextrose Oral Gel 15 Gram(s) Oral once PRN Blood Glucose LESS THAN 70 milliGRAM(s)/deciliter  HYDROmorphone  Injectable 0.5 milliGRAM(s) IV Push every 4 hours PRN Severe Pain (7 - 10)  ketorolac   Injectable 15 milliGRAM(s) IV Push every 6 hours PRN Moderate Pain (4 - 6)      Allergies    amoxicillin (Unknown)  clindamycin (Pruritus)  fish (Hives; Urticaria)  iodine containing compounds (Hives; Anaphylaxis)  penicillin (Hives)  shellfish. (Hives; Anaphylaxis)    Intolerances    Bactrim I.V. (Rash (Mod to Severe))      SOCIAL HISTORY:    FAMILY HISTORY:  FH: diabetes mellitus  father and mother    FH: hypertension  father and mother        Vital Signs Last 24 Hrs  T(C): 36.7 (20 Mar 2023 09:06), Max: 37.1 (19 Mar 2023 20:15)  T(F): 98.1 (20 Mar 2023 09:06), Max: 98.7 (19 Mar 2023 20:15)  HR: 87 (20 Mar 2023 09:06) (87 - 91)  BP: 141/81 (20 Mar 2023 09:06) (138/81 - 147/91)  BP(mean): --  RR: 17 (20 Mar 2023 09:06) (16 - 18)  SpO2: 94% (20 Mar 2023 09:06) (94% - 98%)    Parameters below as of 20 Mar 2023 09:06  Patient On (Oxygen Delivery Method): room air        On PE:  General:  Abdomen:    :    EXT:    LABS:                        11.4   7.22  )-----------( 369      ( 20 Mar 2023 05:30 )             38.4     03-20    136  |  102  |  11  ----------------------------<  312<H>  4.2   |  22  |  0.65    Ca    8.5      20 Mar 2023 05:30  Phos  3.6     03-20  Mg     1.8     03-20    TPro  6.3  /  Alb  3.0<L>  /  TBili  0.2  /  DBili  x   /  AST  13  /  ALT  8<L>  /  AlkPhos  129<H>  03-20    PT/INR - ( 20 Mar 2023 05:30 )   PT: 13.1 sec;   INR: 1.10          PTT - ( 20 Mar 2023 05:30 )  PTT:25.1 sec  Urinalysis Basic - ( 18 Mar 2023 23:03 )    Color: Yellow / Appearance: Clear / S.025 / pH: x  Gluc: x / Ketone: NEGATIVE  / Bili: Negative / Urobili: 0.2 E.U./dL   Blood: x / Protein: 30 mg/dL / Nitrite: POSITIVE   Leuk Esterase: Trace / RBC: > 10 /HPF / WBC > 10 /HPF   Sq Epi: x / Non Sq Epi: 0-5 /HPF / Bacteria: Present /HPF        RADIOLOGY & ADDITIONAL STUDIES: 43y yo Female  with PMH of HTN, HLD, Abdominal infection c/b Necrotizing fascitis (s/p suprapubic catheter and ostomy bag placement on 21 at [facility]) , who presents to the ED with concerns of fatigue and episodes of night sweats for last few weeks. Patient stated that she has been recently having concerns that she has been difficulty taking care of her ostomy bag and concerns for ostomy reversal and was hoping to get supplies and cares from the hospital. On workup CT Abdomen and Pelvis w/ Oral Cont and w/ IV Cont 4 cm right ovarian cyst, Pigtail suprapubic cystostomy catheter in place in the urinary  bladder. There is diffuse urinary bladder inflammation suggesting cystitis. Patient found to have UTI, Urology consulted for catheter exchange.     consulted for dysfunctional SPT. Per pt, she has been followed by Urologist associated with [facility] since SPT was originally placed 21 (does not recall name of Urologist). She has been getting SPT exchanged every month by IR 2/2 cannot tolerate bedside SPT exchange. Also states she need to be premedicated prior to procedure because she is allergic to contrast. Pt states 2 months ago, SPT was pulled out by doctor at [facility] to see if pt could urinate from urethra. Pt was able to urinate ok, and was sent home. Original SPT site closed and healed without complications. Pt states she preferred SPT 2/2 nonambulatory and has pain with urination so SPT was redone by IR at [facility] but new incision/insertion site was moved to left pubic area below ostomy site. Per pt, SPT was draining fine, but last night when nursing changed ostomy bag, SPT was pulled out. Of note, pt states she would like to establish care with a Urologist at [facility] since her other doctors are here. Since SPT became dislodged last night, pt has been urinating from urethra. Bladder scan 0cc.  + dysuria. Denies fevers, chills, nausea, vomiting, hematuria.     Assessed at bedside. SPT (8fr catheter) appears to be dislodged-stitches are 3 inches distal to insertion site. Unable to flush.      Vital Signs Last 24 Hrs  T(C): 36.7 (20 Mar 2023 09:06), Max: 37.1 (19 Mar 2023 20:15)  T(F): 98.1 (20 Mar 2023 09:06), Max: 98.7 (19 Mar 2023 20:15)  HR: 87 (20 Mar 2023 09:06) (87 - 91)  BP: 141/81 (20 Mar 2023 09:06) (138/81 - 147/91)  BP(mean): --  RR: 17 (20 Mar 2023 09:06) (16 - 18)  SpO2: 94% (20 Mar 2023 09:06) (94% - 98%)    Parameters below as of 20 Mar 2023 09:06  Patient On (Oxygen Delivery Method): room air    On PE    General: awake and alert  Abdomen: diffuse abdominal tenderness. ostomy   :  SPT (8fr catheter) appears to be dislodged-stitches are 3 inches distal to insertion site. Unable to flush.  prima fit attached to urethra-draining clear yellow urine.    EXT:    LABS:                        11.4   7.22  )-----------( 369      ( 20 Mar 2023 05:30 )             38.4     03-20    136  |  102  |  11  ----------------------------<  312<H>  4.2   |  22  |  0.65    Ca    8.5      20 Mar 2023 05:30  Phos  3.6     03-20  Mg     1.8     03-20    TPro  6.3  /  Alb  3.0<L>  /  TBili  0.2  /  DBili  x   /  AST  13  /  ALT  8<L>  /  AlkPhos  129<H>  03-20    PT/INR - ( 20 Mar 2023 05:30 )   PT: 13.1 sec;   INR: 1.10          PTT - ( 20 Mar 2023 05:30 )  PTT:25.1 sec  Urinalysis Basic - ( 18 Mar 2023 23:03 )    Color: Yellow / Appearance: Clear / S.025 / pH: x  Gluc: x / Ketone: NEGATIVE  / Bili: Negative / Urobili: 0.2 E.U./dL   Blood: x / Protein: 30 mg/dL / Nitrite: POSITIVE   Leuk Esterase: Trace / RBC: > 10 /HPF / WBC > 10 /HPF   Sq Epi: x / Non Sq Epi: 0-5 /HPF / Bacteria: Present /HPF        RADIOLOGY & ADDITIONAL STUDIES:

## 2023-03-20 NOTE — PROGRESS NOTE ADULT - SUBJECTIVE AND OBJECTIVE BOX
SUBJECTIVE / INTERVAL HPI: Patient was seen and examined this morning. She reports having decreased appetite. She mentioned that at home she was drinking juices and sodas with regular sugar. She was drinking smoothies for breakfast made with banana, milk and regular sugar. Blood glucose levels were within target yesterday afternoon, possible from residual effects from U-500 doses she was taking at home. She received 40 units of lantus before dinner, and received an additional 50 units of lantus around 1 PM today. Blood glucose levels have been above target since this morning.     CAPILLARY BLOOD GLUCOSE & INSULIN RECEIVED  496 mg/dL (03-18 @ 21:52) ? Ø  344 mg/dL (03-19 @ 00:29) ? 5 units of regular insulin IVP once.   205 mg/dL (03-19 @ 01:18) ? Ø  338 mg/dL (03-19 @ 06:47) ? 8 units of lispro sliding scale.   —  mg/dL (03-19 @ 09:43) ? 30 units of lispro.   127 mg/dL (03-19 @ 12:10) ? 30 units of lispro.  176 mg/dL (03-19 @ 16:52) ? 40 units of lantus + 20 units of lispro + 2 units of lispro sliding scale.   297 mg/dL (03-20 @ 06:03) ? 9 units of lispro sliding scale (NPO)  227 mg/dL (03-20 @ 11:01) ? 6 units of lispro sliding scale (NPO)  369 mg/dL (03-20 @ 13:20) ? 50 units of lantus.     REVIEW OF SYSTEMS  Constitutional:  (+) Decreased appetite. Negative fever, chills.  Cardiovascular:  Negative for chest pain or palpitations.  Respiratory:  Negative for cough, wheezing, or shortness of breath.    Gastrointestinal:  (+) Abdominal pain. Negative for nausea, vomiting, diarrhea, constipation.  Neurologic:  No headache, confusion, dizziness, lightheadedness.    PHYSICAL EXAM  Vital Signs Last 24 Hrs  T(C): 37 (20 Mar 2023 16:27), Max: 37 (20 Mar 2023 16:27)  T(F): 98.6 (20 Mar 2023 16:27), Max: 98.6 (20 Mar 2023 16:27)  HR: 86 (20 Mar 2023 16:27) (86 - 97)  BP: 136/85 (20 Mar 2023 16:27) (133/72 - 147/91)  BP(mean): --  RR: 17 (20 Mar 2023 16:27) (16 - 18)  SpO2: 96% (20 Mar 2023 16:27) (92% - 98%)    Parameters below as of 20 Mar 2023 16:27  Patient On (Oxygen Delivery Method): room air    Constitutional: Awake, alert, female, in no acute distress.   HEENT: Normocephalic, atraumatic, NAOMI.  Respiratory: Lungs clear to ausculation bilaterally.   Cardiovascular: regular rhythm, normal S1 and S2, no audible murmurs.   GI: soft, mildly tender, non-distended, (+) Ostomy. (+) Suprapubic catheter.   Extremities: No lower extremity edema.  Psychiatric: AAO x 3. Normal affect/mood.     LABS  CBC - WBC/HGB/HTC/PLT: 7.22/11.4/38.4/369 (03-20-23)  BMP - Na/K/Cl/Bicarb/BUN/Cr/Gluc/AG/eGFR: 136/4.2/102/22/11/0.65/312/12/112 (03-20-23)  Ca - 8.5 (03-20-23)  Phos - 3.6 (03-20-23)  Mg - 1.8 (03-20-23)  LFT - Alb/Tprot/Tbili/Dbili/AlkPhos/ALT/AST: 3.0/--/0.2/--/129/8/13 (03-20-23)  PT/aPTT/INR: 13.1/25.1/1.10 (03-20-23)     MEDICATIONS  MEDICATIONS  (STANDING):  acetaminophen     Tablet .. 975 milliGRAM(s) Oral every 8 hours  budesonide 160 MICROgram(s)/formoterol 4.5 MICROgram(s) Inhaler 2 Puff(s) Inhalation two times a day  dextrose 5%. 1000 milliLiter(s) (100 mL/Hr) IV Continuous <Continuous>  dextrose 5%. 1000 milliLiter(s) (50 mL/Hr) IV Continuous <Continuous>  dextrose 50% Injectable 25 Gram(s) IV Push once  dextrose 50% Injectable 12.5 Gram(s) IV Push once  dextrose 50% Injectable 25 Gram(s) IV Push once  glucagon  Injectable 1 milliGRAM(s) IntraMuscular once  insulin glargine Injectable (LANTUS) 20 Unit(s) SubCutaneous at bedtime  insulin lispro (ADMELOG) corrective regimen sliding scale   SubCutaneous Before meals and at bedtime  insulin lispro Injectable (ADMELOG) 30 Unit(s) SubCutaneous three times a day before meals  ketorolac   Injectable 15 milliGRAM(s) IV Push every 8 hours  losartan 50 milliGRAM(s) Oral daily  oxybutynin 5 milliGRAM(s) Oral every 8 hours  piperacillin/tazobactam IVPB.. 4.5 Gram(s) IV Intermittent every 8 hours  sodium chloride 0.9%. 1000 milliLiter(s) (60 mL/Hr) IV Continuous <Continuous>    MEDICATIONS  (PRN):  albuterol/ipratropium for Nebulization 3 milliLiter(s) Nebulizer every 6 hours PRN Shortness of Breath and/or Wheezing  dextrose Oral Gel 15 Gram(s) Oral once PRN Blood Glucose LESS THAN 70 milliGRAM(s)/deciliter  HYDROmorphone  Injectable 0.5 milliGRAM(s) IV Push every 4 hours PRN Severe Pain (7 - 10)    ASSESSMENT / RECOMMENDATIONS  Ms. Katz is a 43 year old woman with history of poorly controlled type 2 diabetes mellitus on insulin (outpt endo Dr. Nisha Joseph, last seen Feb 2023), abdominal infection complicate dby necrotizing fasciitis, s/p suprapubic catheter, ostomy bag placement who was admitted for complicated UTI for whom endocrine is consulted for hyperglycemia.     A1C: 12.5 %  BUN: 11  Creatinine: 0.65  GFR: 112  Weight: 99.8  BMI: 34.5    # Type 2 diabetes mellitus  - Blood glucose levels above target.   - Please administer a one-time dose of lantus 20 units tonight at bedtime. Please cancel dose of lantus ordered for tomorrow at noon as plan is to transition her to a night-time schedule for lantus.   - Increase lispro 30 to units before each meal.  - Continue with customized order for Lispro insulin correctional scale as follows (FSG = Fingerstick glucose) to be given before meals:         -200, give 3 units        -250, give 6 units        -300, give 9 units        -350, give 12 units        -400, give 15 units        -450, give 18 units         and above, give 21 units   - Patient's fingerstick glucose goal is 100-180 mg/dL.    - Patient can follow up at discharge with Dr. Joseph at [facility] by calling [phone] to make an appointment.      Case discussed with Dr. Bowden. Primary team updated.       Gerald Qiu    Endocrinology Fellow    Service Pager: [phone]

## 2023-03-20 NOTE — DIETITIAN INITIAL EVALUATION ADULT - OTHER CALCULATIONS
Based on Standards of Care pt >% IBW (163% IBW), thus ideal body weight used for all calculations. Needs adjusted based on age, clinical status.

## 2023-03-20 NOTE — PROGRESS NOTE ADULT - PROBLEM SELECTOR PLAN 2
Placed on 11/11/21 at [facility] due to abdominal necrotizing fascitis   - Ostomy care and wound care education   - Patient is interested in having care and here at [facility] and establishing follow up with surgery here for reversal as she told she was informed about the reversal from her surgery team at other hospital but was lost to follow up

## 2023-03-20 NOTE — PROGRESS NOTE ADULT - ATTENDING COMMENTS
Pt seen on rounds this afternoon and events of the weekend reviewed.  43-yo woman with a history of abdominal infection/necrotizing fasciitis in 2021 which resulted in her requiring ostomy and suprapubic catheter.  She has now been admitted for a complicated UTI.  She has a history of insulin-requiring type 2 DM and marked insulin resistance, and was on extremely large doses of U-500 insulin at home.  Diet is a significant barrier to improved control, especially in regard to her heavy intake of fruits and juices, and her use of regular sugar in beverages, etc.  Her glucoses since admission were in target range for a while yesterday (after minimal correction doses for the glucose of 496 on admission), but have since risen back to the 200-350 range.  She received Lantus yesterday evening (40 units) and today (50 units).  Has also been started on pre-meal lispro 30 units.  At the time of our visit she was coughing repeatedly and seemed to be audibly wheezing, but actually had little actual wheezing on auscultation of her lungs.  She reports using albuterol frequently at home, but is not on a maintenance inhaler.  Says that Singulair helps with her cough  For now, would like to stabilize her on a nighttime dose of Lantus, then add an AM dose depending on response.  Given the Lantus she already has on board, would give another 20 units tonight, D/C any other orders for standing Lantus.    Would continue lispro 30 units premeal

## 2023-03-21 ENCOUNTER — TRANSCRIPTION ENCOUNTER (OUTPATIENT)
Age: 43
End: 2023-03-21

## 2023-03-21 LAB
-  AMPICILLIN: SIGNIFICANT CHANGE UP
-  CIPROFLOXACIN: SIGNIFICANT CHANGE UP
-  TETRACYCLINE: SIGNIFICANT CHANGE UP
-  VANCOMYCIN: SIGNIFICANT CHANGE UP
ALBUMIN SERPL ELPH-MCNC: 2.9 G/DL — LOW (ref 3.3–5)
ALP SERPL-CCNC: 134 U/L — HIGH (ref 40–120)
ALT FLD-CCNC: 9 U/L — LOW (ref 10–45)
ANION GAP SERPL CALC-SCNC: 8 MMOL/L — SIGNIFICANT CHANGE UP (ref 5–17)
APTT BLD: 29.1 SEC — SIGNIFICANT CHANGE UP (ref 27.5–35.5)
AST SERPL-CCNC: 12 U/L — SIGNIFICANT CHANGE UP (ref 10–40)
BASOPHILS # BLD AUTO: 0.02 K/UL — SIGNIFICANT CHANGE UP (ref 0–0.2)
BASOPHILS NFR BLD AUTO: 0.3 % — SIGNIFICANT CHANGE UP (ref 0–2)
BILIRUB SERPL-MCNC: <0.2 MG/DL — SIGNIFICANT CHANGE UP (ref 0.2–1.2)
BUN SERPL-MCNC: 9 MG/DL — SIGNIFICANT CHANGE UP (ref 7–23)
CALCIUM SERPL-MCNC: 8.7 MG/DL — SIGNIFICANT CHANGE UP (ref 8.4–10.5)
CHLORIDE SERPL-SCNC: 102 MMOL/L — SIGNIFICANT CHANGE UP (ref 96–108)
CO2 SERPL-SCNC: 26 MMOL/L — SIGNIFICANT CHANGE UP (ref 22–31)
CREAT SERPL-MCNC: 0.61 MG/DL — SIGNIFICANT CHANGE UP (ref 0.5–1.3)
EGFR: 114 ML/MIN/1.73M2 — SIGNIFICANT CHANGE UP
EOSINOPHIL # BLD AUTO: 0.16 K/UL — SIGNIFICANT CHANGE UP (ref 0–0.5)
EOSINOPHIL NFR BLD AUTO: 2.5 % — SIGNIFICANT CHANGE UP (ref 0–6)
GLUCOSE BLDC GLUCOMTR-MCNC: 114 MG/DL — HIGH (ref 70–99)
GLUCOSE BLDC GLUCOMTR-MCNC: 177 MG/DL — HIGH (ref 70–99)
GLUCOSE BLDC GLUCOMTR-MCNC: 185 MG/DL — HIGH (ref 70–99)
GLUCOSE BLDC GLUCOMTR-MCNC: 208 MG/DL — HIGH (ref 70–99)
GLUCOSE SERPL-MCNC: 248 MG/DL — HIGH (ref 70–99)
HCT VFR BLD CALC: 37.2 % — SIGNIFICANT CHANGE UP (ref 34.5–45)
HGB BLD-MCNC: 11.2 G/DL — LOW (ref 11.5–15.5)
IMM GRANULOCYTES NFR BLD AUTO: 0.5 % — SIGNIFICANT CHANGE UP (ref 0–0.9)
INR BLD: 1.12 — SIGNIFICANT CHANGE UP (ref 0.88–1.16)
LYMPHOCYTES # BLD AUTO: 2.24 K/UL — SIGNIFICANT CHANGE UP (ref 1–3.3)
LYMPHOCYTES # BLD AUTO: 34.6 % — SIGNIFICANT CHANGE UP (ref 13–44)
MAGNESIUM SERPL-MCNC: 1.8 MG/DL — SIGNIFICANT CHANGE UP (ref 1.6–2.6)
MCHC RBC-ENTMCNC: 24.1 PG — LOW (ref 27–34)
MCHC RBC-ENTMCNC: 30.1 GM/DL — LOW (ref 32–36)
MCV RBC AUTO: 80 FL — SIGNIFICANT CHANGE UP (ref 80–100)
METHOD TYPE: SIGNIFICANT CHANGE UP
MONOCYTES # BLD AUTO: 0.44 K/UL — SIGNIFICANT CHANGE UP (ref 0–0.9)
MONOCYTES NFR BLD AUTO: 6.8 % — SIGNIFICANT CHANGE UP (ref 2–14)
NEUTROPHILS # BLD AUTO: 3.58 K/UL — SIGNIFICANT CHANGE UP (ref 1.8–7.4)
NEUTROPHILS NFR BLD AUTO: 55.3 % — SIGNIFICANT CHANGE UP (ref 43–77)
NRBC # BLD: 0 /100 WBCS — SIGNIFICANT CHANGE UP (ref 0–0)
PHOSPHATE SERPL-MCNC: 3.5 MG/DL — SIGNIFICANT CHANGE UP (ref 2.5–4.5)
PLATELET # BLD AUTO: 365 K/UL — SIGNIFICANT CHANGE UP (ref 150–400)
POTASSIUM SERPL-MCNC: 3.9 MMOL/L — SIGNIFICANT CHANGE UP (ref 3.5–5.3)
POTASSIUM SERPL-SCNC: 3.9 MMOL/L — SIGNIFICANT CHANGE UP (ref 3.5–5.3)
PROT SERPL-MCNC: 6.8 G/DL — SIGNIFICANT CHANGE UP (ref 6–8.3)
PROTHROM AB SERPL-ACNC: 13.3 SEC — SIGNIFICANT CHANGE UP (ref 10.5–13.4)
RBC # BLD: 4.65 M/UL — SIGNIFICANT CHANGE UP (ref 3.8–5.2)
RBC # FLD: 14.6 % — HIGH (ref 10.3–14.5)
SARS-COV-2 RNA SPEC QL NAA+PROBE: SIGNIFICANT CHANGE UP
SODIUM SERPL-SCNC: 136 MMOL/L — SIGNIFICANT CHANGE UP (ref 135–145)
WBC # BLD: 6.47 K/UL — SIGNIFICANT CHANGE UP (ref 3.8–10.5)
WBC # FLD AUTO: 6.47 K/UL — SIGNIFICANT CHANGE UP (ref 3.8–10.5)

## 2023-03-21 PROCEDURE — 74176 CT ABD & PELVIS W/O CONTRAST: CPT | Mod: 26

## 2023-03-21 PROCEDURE — 99232 SBSQ HOSP IP/OBS MODERATE 35: CPT

## 2023-03-21 PROCEDURE — 99233 SBSQ HOSP IP/OBS HIGH 50: CPT | Mod: GC

## 2023-03-21 RX ORDER — MONTELUKAST 4 MG/1
10 TABLET, CHEWABLE ORAL EVERY 24 HOURS
Refills: 0 | Status: DISCONTINUED | OUTPATIENT
Start: 2023-03-21 | End: 2023-03-27

## 2023-03-21 RX ORDER — INSULIN LISPRO 100/ML
36 VIAL (ML) SUBCUTANEOUS
Refills: 0 | Status: DISCONTINUED | OUTPATIENT
Start: 2023-03-21 | End: 2023-03-22

## 2023-03-21 RX ORDER — INSULIN GLARGINE 100 [IU]/ML
60 INJECTION, SOLUTION SUBCUTANEOUS AT BEDTIME
Refills: 0 | Status: DISCONTINUED | OUTPATIENT
Start: 2023-03-21 | End: 2023-03-27

## 2023-03-21 RX ORDER — INSULIN LISPRO 100/ML
VIAL (ML) SUBCUTANEOUS
Refills: 0 | Status: DISCONTINUED | OUTPATIENT
Start: 2023-03-21 | End: 2023-03-24

## 2023-03-21 RX ADMIN — HYDROMORPHONE HYDROCHLORIDE 1 MILLIGRAM(S): 2 INJECTION INTRAMUSCULAR; INTRAVENOUS; SUBCUTANEOUS at 10:16

## 2023-03-21 RX ADMIN — LOSARTAN POTASSIUM 50 MILLIGRAM(S): 100 TABLET, FILM COATED ORAL at 07:34

## 2023-03-21 RX ADMIN — PIPERACILLIN AND TAZOBACTAM 25 GRAM(S): 4; .5 INJECTION, POWDER, LYOPHILIZED, FOR SOLUTION INTRAVENOUS at 08:54

## 2023-03-21 RX ADMIN — MONTELUKAST 10 MILLIGRAM(S): 4 TABLET, CHEWABLE ORAL at 15:11

## 2023-03-21 RX ADMIN — INSULIN GLARGINE 60 UNIT(S): 100 INJECTION, SOLUTION SUBCUTANEOUS at 22:48

## 2023-03-21 RX ADMIN — Medication 3 MILLILITER(S): at 05:28

## 2023-03-21 RX ADMIN — Medication 100 MILLIGRAM(S): at 09:01

## 2023-03-21 RX ADMIN — HYDROMORPHONE HYDROCHLORIDE 1 MILLIGRAM(S): 2 INJECTION INTRAMUSCULAR; INTRAVENOUS; SUBCUTANEOUS at 02:00

## 2023-03-21 RX ADMIN — HYDROMORPHONE HYDROCHLORIDE 1 MILLIGRAM(S): 2 INJECTION INTRAMUSCULAR; INTRAVENOUS; SUBCUTANEOUS at 14:52

## 2023-03-21 RX ADMIN — Medication 30 UNIT(S): at 13:03

## 2023-03-21 RX ADMIN — Medication 3: at 13:02

## 2023-03-21 RX ADMIN — HYDROMORPHONE HYDROCHLORIDE 1 MILLIGRAM(S): 2 INJECTION INTRAMUSCULAR; INTRAVENOUS; SUBCUTANEOUS at 10:31

## 2023-03-21 RX ADMIN — HYDROMORPHONE HYDROCHLORIDE 1 MILLIGRAM(S): 2 INJECTION INTRAMUSCULAR; INTRAVENOUS; SUBCUTANEOUS at 06:20

## 2023-03-21 RX ADMIN — Medication 5 MILLIGRAM(S): at 22:48

## 2023-03-21 RX ADMIN — Medication 1000 MILLIGRAM(S): at 00:10

## 2023-03-21 RX ADMIN — HYDROMORPHONE HYDROCHLORIDE 1 MILLIGRAM(S): 2 INJECTION INTRAMUSCULAR; INTRAVENOUS; SUBCUTANEOUS at 18:44

## 2023-03-21 RX ADMIN — PIPERACILLIN AND TAZOBACTAM 25 GRAM(S): 4; .5 INJECTION, POWDER, LYOPHILIZED, FOR SOLUTION INTRAVENOUS at 01:14

## 2023-03-21 RX ADMIN — PIPERACILLIN AND TAZOBACTAM 25 GRAM(S): 4; .5 INJECTION, POWDER, LYOPHILIZED, FOR SOLUTION INTRAVENOUS at 16:58

## 2023-03-21 RX ADMIN — HYDROMORPHONE HYDROCHLORIDE 1 MILLIGRAM(S): 2 INJECTION INTRAMUSCULAR; INTRAVENOUS; SUBCUTANEOUS at 14:37

## 2023-03-21 RX ADMIN — HYDROMORPHONE HYDROCHLORIDE 1 MILLIGRAM(S): 2 INJECTION INTRAMUSCULAR; INTRAVENOUS; SUBCUTANEOUS at 06:05

## 2023-03-21 RX ADMIN — Medication 15 MILLIGRAM(S): at 09:16

## 2023-03-21 RX ADMIN — Medication 30 UNIT(S): at 18:02

## 2023-03-21 RX ADMIN — Medication 5 MILLIGRAM(S): at 16:59

## 2023-03-21 RX ADMIN — HYDROMORPHONE HYDROCHLORIDE 1 MILLIGRAM(S): 2 INJECTION INTRAMUSCULAR; INTRAVENOUS; SUBCUTANEOUS at 23:12

## 2023-03-21 RX ADMIN — Medication 15 MILLIGRAM(S): at 09:01

## 2023-03-21 RX ADMIN — Medication 5 MILLIGRAM(S): at 07:35

## 2023-03-21 RX ADMIN — BENZOCAINE AND MENTHOL 1 LOZENGE: 5; 1 LIQUID ORAL at 16:59

## 2023-03-21 RX ADMIN — HYDROMORPHONE HYDROCHLORIDE 1 MILLIGRAM(S): 2 INJECTION INTRAMUSCULAR; INTRAVENOUS; SUBCUTANEOUS at 23:30

## 2023-03-21 RX ADMIN — Medication 30 UNIT(S): at 08:54

## 2023-03-21 RX ADMIN — HYDROMORPHONE HYDROCHLORIDE 1 MILLIGRAM(S): 2 INJECTION INTRAMUSCULAR; INTRAVENOUS; SUBCUTANEOUS at 01:44

## 2023-03-21 RX ADMIN — Medication 3: at 18:01

## 2023-03-21 NOTE — DISCHARGE NOTE PROVIDER - NPI NUMBER (FOR SYSADMIN USE ONLY) :
[[id number]] [[id number]],[[id number]] [[id number]],[[id number]],[[id number]] [[id number]]

## 2023-03-21 NOTE — DISCHARGE NOTE PROVIDER - NSDCFUADDAPPT_GEN_ALL_CORE_FT
Please bring your Insurance card, Photo ID and Discharge paperwork to the following appointment:    (1) Please follow up with your Urology Provider, Dr. Pelon Louis at [address] [place] [address], [place] [address] on 04/10/2023 at 11:00am.    Appointment was scheduled by Ms. NAGI Tam, Referral Coordinator.   Please bring your Insurance card, Photo ID and Discharge paperwork to the following appointment:    (1) Please follow up with your Urology Provider, Dr. Pelon Louis at [address] [place] [address], [place] [address] on 04/10/2023 at 11:00am.    Appointment was scheduled by Ms. NAGI Tam, Referral Coordinator.    2. - Can follow up at discharge with Dr. Joseph at [facility] by calling [phone] to make an appointment.   Please bring your Insurance card, Photo ID and Discharge paperwork to the following appointment:    (1) Please follow up with your Urology Provider, Dr. Pelon Louis at [address] [place] [address], [place] [address] on 04/10/2023 at 11:00am.    Appointment was scheduled by Ms. NAGI Tam, Referral Coordinator.    2. - Can follow up at discharge with Dr. Joseph at [facility] by calling [phone] to make an appointment.      3. Dr. Sánchez's office will call if an earlier appointment arises.  Please bring your Insurance card, Photo ID and Discharge paperwork to the following appointment:    (1) Please follow up with your Urology Provider, Dr. Pelon Louis at [address] [place] [address], [place] [address] on 04/10/2023 at 11:00am.    Appointment was scheduled by Ms. NAGI Tam, Referral Coordinator.    2. - Can follow up at discharge with Dr. Joseph at [facility] by calling [phone] to make an appointment.

## 2023-03-21 NOTE — DISCHARGE NOTE PROVIDER - NSDCCPCAREPLAN_GEN_ALL_CORE_FT
PRINCIPAL DISCHARGE DIAGNOSIS  Diagnosis: Urinary tract infection  Assessment and Plan of Treatment: A urinary tract infection (UTI) is an infection in any part of the urinary system. The urinary system includes the kidneys, ureters, bladder and urethra. Most infections involve the lower urinary tract — the bladder and the urethra.  Women are at greater risk of developing a UTI than are men. If an infection is limited to the bladder, it can be painful and annoying. But serious health problems can result if a UTI spreads to the kidneys.  Health care providers often treat urinary tract infections with antibiotics. You can also take steps to lower the chance of getting a UTI in the first place.      SECONDARY DISCHARGE DIAGNOSES  Diagnosis: Diabetes  Assessment and Plan of Treatment: Diabetes mellitus refers to a group of diseases that affect how the body uses blood sugar (glucose). Glucose is an important source of energy for the cells that make up the muscles and tissues. It's also the brain's main source of fuel.  The main cause of diabetes varies by type. But no matter what type of diabetes you have, it can lead to excess sugar in the blood. Too much sugar in the blood can lead to serious health problems.  Chronic diabetes conditions include type 1 diabetes and type 2 diabetes. Potentially reversible diabetes conditions include prediabetes and gestational diabetes. Prediabetes happens when blood sugar levels are higher than normal. But the blood sugar levels aren't high enough to be called diabetes. And prediabetes can lead to diabetes unless steps are taken to prevent it. Gestational diabetes happens during pregnancy. But it may go away after the baby is born.     PRINCIPAL DISCHARGE DIAGNOSIS  Diagnosis: Urinary tract infection  Assessment and Plan of Treatment: A urinary tract infection (UTI) is an infection in any part of the urinary system. The urinary system includes the kidneys, ureters, bladder and urethra. Most infections involve the lower urinary tract — the bladder and the urethra.  Women are at greater risk of developing a UTI than are men. If an infection is limited to the bladder, it can be painful and annoying. But serious health problems can result if a UTI spreads to the kidneys.  Health care providers often treat urinary tract infections with antibiotics. You can also take steps to lower the chance of getting a UTI in the first place.  We treated your urinary tract infection with IV antibiotics and it was succesfully treated. We also exchanged your suprapubic catheter with a larger one.  Please follow-up outpatient with urology.      SECONDARY DISCHARGE DIAGNOSES  Diagnosis: Diabetes  Assessment and Plan of Treatment: Diabetes mellitus refers to a group of diseases that affect how the body uses blood sugar (glucose). Glucose is an important source of energy for the cells that make up the muscles and tissues. It's also the brain's main source of fuel.  The main cause of diabetes varies by type. But no matter what type of diabetes you have, it can lead to excess sugar in the blood. Too much sugar in the blood can lead to serious health problems.  Chronic diabetes conditions include type 1 diabetes and type 2 diabetes. Potentially reversible diabetes conditions include prediabetes and gestational diabetes. Prediabetes happens when blood sugar levels are higher than normal. But the blood sugar levels aren't high enough to be called diabetes. And prediabetes can lead to diabetes unless steps are taken to prevent it. Gestational diabetes happens during pregnancy. But it may go away after the baby is born.  Your sugars were elevated upon admission, but with the appropriate insulin regimen we were able to control them.  Upon discharge please take _____.     PRINCIPAL DISCHARGE DIAGNOSIS  Diagnosis: Urinary tract infection  Assessment and Plan of Treatment: A urinary tract infection (UTI) is an infection in any part of the urinary system. The urinary system includes the kidneys, ureters, bladder and urethra. Most infections involve the lower urinary tract — the bladder and the urethra.  Women are at greater risk of developing a UTI than are men. If an infection is limited to the bladder, it can be painful and annoying. But serious health problems can result if a UTI spreads to the kidneys.  Health care providers often treat urinary tract infections with antibiotics. You can also take steps to lower the chance of getting a UTI in the first place.  We treated your urinary tract infection with IV antibiotics and it was succesfully treated. We also exchanged your suprapubic catheter with a larger one.  Please follow-up outpatient with urology.      SECONDARY DISCHARGE DIAGNOSES  Diagnosis: Diabetes  Assessment and Plan of Treatment: Diabetes mellitus refers to a group of diseases that affect how the body uses blood sugar (glucose). Glucose is an important source of energy for the cells that make up the muscles and tissues. It's also the brain's main source of fuel.  The main cause of diabetes varies by type. But no matter what type of diabetes you have, it can lead to excess sugar in the blood. Too much sugar in the blood can lead to serious health problems.  Chronic diabetes conditions include type 1 diabetes and type 2 diabetes. Potentially reversible diabetes conditions include prediabetes and gestational diabetes. Prediabetes happens when blood sugar levels are higher than normal. But the blood sugar levels aren't high enough to be called diabetes. And prediabetes can lead to diabetes unless steps are taken to prevent it. Gestational diabetes happens during pregnancy. But it may go away after the baby is born.  Your sugars were elevated upon admission, but with the appropriate insulin regimen we were able to control them.  Upon discharge please take u500 160U three times a day and ademlog 35U if your 2 hour glucose after eating is greater than 200mg.  Please follow up with Dr. Joseph, and call her office surrounding questions.

## 2023-03-21 NOTE — DISCHARGE NOTE PROVIDER - PROVIDER TOKENS
PROVIDER:[TOKEN:[[id number]],FOLLOWUP:[1 month]] PROVIDER:[TOKEN:[[id number]],SCHEDULEDAPPT:[04/10/2023],SCHEDULEDAPPTTIME:[11:00 AM]] PROVIDER:[TOKEN:[[id number]],SCHEDULEDAPPT:[04/10/2023],SCHEDULEDAPPTTIME:[11:00 AM]],PROVIDER:[TOKEN:[[id number]],FOLLOWUP:[Routine]] PROVIDER:[TOKEN:[[id number]],SCHEDULEDAPPT:[04/10/2023],SCHEDULEDAPPTTIME:[11:00 AM]],PROVIDER:[TOKEN:[[id number]],FOLLOWUP:[Routine]],PROVIDER:[TOKEN:[[id number]],FOLLOWUP:[1 month],ESTABLISHEDPATIENT:[T]] PROVIDER:[TOKEN:[[id number]],SCHEDULEDAPPT:[04/10/2023],SCHEDULEDAPPTTIME:[11:00 AM]],PROVIDER:[TOKEN:[[id number]],FOLLOWUP:[Routine]],PROVIDER:[TOKEN:[[id number]],FOLLOWUP:[1 month],ESTABLISHEDPATIENT:[T]],PROVIDER:[TOKEN:[[id number]],SCHEDULEDAPPT:[05/01/2023],ESTABLISHEDPATIENT:[T]] PROVIDER:[TOKEN:[[id number]],SCHEDULEDAPPT:[04/10/2023],SCHEDULEDAPPTTIME:[11:00 AM]],PROVIDER:[TOKEN:[[id number]],FOLLOWUP:[Routine]],PROVIDER:[TOKEN:[[id number]],FOLLOWUP:[1 month],ESTABLISHEDPATIENT:[T]],PROVIDER:[TOKEN:[[id number]],SCHEDULEDAPPT:[04/03/2023],SCHEDULEDAPPTTIME:[01:30 PM],ESTABLISHEDPATIENT:[T]]

## 2023-03-21 NOTE — DISCHARGE NOTE PROVIDER - HOSPITAL COURSE
43y yo Female  with PMH of HTN, HLD, Abdominal infection c/b Necrotizing fascitis (s/p suprapubic catheter and ostomy bag placement on 11/11/21 at [facility]) , who presents to the ED with concerns of fatigue and episodes of night sweats for last few weeks. Patient is being admitted to [facility] for further management of severe sepsis 2/2 complicated cystitis. Urine cx growing enterococcus and klebsiella. Urology recommending exchange of suprapubic cath however since placed 6wks ago too new for them to exchange. IR consulted for exchange. Course c/b suprapubic cath becoming dislodge, however able to be replaced by patient. CT a/p ordered to eval positioning.     Problem List/Main Diagnoses (problem-based):    Problem/Plan - 1:  ·  Problem: Severe sepsis.   ·  Plan: 2/2 complicated urinary tract infection   SIRS Positive for tachycardia and WBC +lactic acidosis >5 with suspected source of infection   - c/w zosyn due to multiple hospitalization and concern for broader coverage( Patient denied any anaphylactic reaction from penicillin)   - urine cx growing klebsiella and enterococcus faecalis     -Enterococcus susceptible to ampicillin  - IR consult for suprapubic cath exchange; Pending CT abd w/o contrast.     Problem/Plan - 2:  ·  Problem: History of creation of ostomy.   ·  Plan: Placed on 11/11/21 at [facility] due to abdominal necrotizing fascitis   - Ostomy care and wound care education   - Patient is interested in having care and here at [facility] and establishing follow up with surgery here for reversal as she told she was informed about the reversal from her surgery team at other hospital but was lost to follow up.     Problem/Plan - 3:  ·  Problem: Diabetes.   ·  Plan: A1c of 10 on previous admission   Home med: Admelog 35units premeals and Humulin 160u TID   Hyperglycemic oh admission with FS>500, downtrended afer fluids and Insulin s/q   - A1c 12.5   - No acidosis or AG  - Resume ___ on discharge     Problem/Plan - 4:  ·  Problem: Essential hypertension.   ·  Plan: Home med: Labetalol 100mg qd and Losartan 50mg qd  - c/w home meds on discharge     Problem/Plan - 5:  ·  Problem: Obesity.   ·  Plan: BMI> 35  - Patient uses walker for mobility so cant exercise and would benefit from Nutrition consult.    Inpatient treatment course:   New medications:   Labs to be followed outpatient:   Exam to be followed outpatient:    43y yo Female  with PMH of HTN, HLD, Abdominal infection c/b Necrotizing fascitis (s/p suprapubic catheter and ostomy bag placement on 11/11/21 at [facility]) , who presents to the ED with concerns of fatigue and episodes of night sweats for last few weeks. Patient is being admitted to [facility] for further management of severe sepsis 2/2 complicated cystitis. Urine cx growing enterococcus and klebsiella. Urology recommending exchange of suprapubic cath however since placed 6wks ago too new for them to exchange. IR consulted for exchange. Course c/b suprapubic cath becoming dislodge, however able to be replaced by patient. CT a/p ordered to eval positioning. SPC in position but planned exchange for larger tube.     Problem List/Main Diagnoses (problem-based):     ·  Problem: Severe sepsis.   ·  Plan: 2/2 complicated urinary tract infection   SIRS Positive for tachycardia and WBC +lactic acidosis >5 with suspected source of infection   - c/w zosyn due to multiple hospitalization and concern for broader coverage( Patient denied any anaphylactic reaction from penicillin)   - urine cx growing klebsiella and enterococcus faecalis     -Enterococcus susceptible to ampicillin  - IR consult for suprapubic cath exchange; Pending CT abd w/o contrast.    ·  Problem: History of creation of ostomy.   ·  Plan: Placed on 11/11/21 at [facility] due to abdominal necrotizing fascitis   - Ostomy care and wound care education   - Patient is interested in having care and here at [facility] and establishing follow up with surgery here for reversal as she told she was informed about the reversal from her surgery team at other hospital but was lost to follow up.    ·  Problem: Diabetes.   ·  Plan: A1c of 10 on previous admission   Home med: Admelog 35units premeals and Humulin 160u TID   Hyperglycemic oh admission with FS>500, downtrended afer fluids and Insulin s/q   - A1c 12.5   - No acidosis or AG  - Resume ___ on discharge    ·  Problem: Essential hypertension.   ·  Plan: Home med: Labetalol 100mg qd and Losartan 50mg qd  - c/w home meds on discharge    ·  Problem: Obesity.   ·  Plan: BMI> 35  - Patient uses walker for mobility so cant exercise and would benefit from Nutrition consult.    Inpatient treatment course:   New medications:   Labs to be followed outpatient:   Exam to be followed outpatient:    43y yo Female  with PMH of HTN, HLD, Abdominal infection c/b Necrotizing fascitis (s/p suprapubic catheter and ostomy bag placement on 11/11/21 at [facility]) , who presents to the ED with concerns of fatigue and episodes of night sweats for last few weeks. Patient is being admitted to [facility] for further management of severe sepsis 2/2 complicated cystitis. Urine cx growing enterococcus and klebsiella. Urology recommending exchange of suprapubic cath however since placed 6wks ago too new for them to exchange. IR consulted for   exchange. Course c/b suprapubic cath becoming dislodge, however able to be replaced by patient. CT a/p ordered to eval positioning. SPC in position but planned exchange for larger tube. Tube exchanged, and UCx growing MDR Klebsiella and E. faecalis. Treated with gentamycin. Hemodynamically stable.    Problem List/Main Diagnoses (problem-based):     ·  Problem: Severe sepsis.   ·  Plan: 2/2 complicated urinary tract infection   SIRS Positive for tachycardia and WBC +lactic acidosis >5 with suspected source of infection   - c/w zosyn due to multiple hospitalization and concern for broader coverage( Patient denied any anaphylactic reaction from penicillin), changed to gentamycin and SPC exchanged.  - urine cx growing klebsiella and enterococcus faecalis, treated successfully with gentamycin.      -Enterococcus susceptible to ampicillin  - Urology outpatient follow-up.    ·  Problem: History of creation of ostomy.   ·  Plan: Placed on 11/11/21 at [facility] due to abdominal necrotizing fascitis   - Ostomy care and wound care education   - Patient is interested in having care and here at [facility] and establishing follow up with surgery here for reversal as she told she was informed about the reversal from her surgery team at other hospital but was lost to follow up.  - Surgery contact info provided    ·  Problem: Diabetes.   ·  Plan: A1c of 10 on previous admission   Home med: Admelog 35units premeals and Humulin 160u TID   Hyperglycemic oh admission with FS>500, downtrended afer fluids and Insulin s/q   - A1c 12.5   - No acidosis or AG  -     ·  Problem: Essential hypertension.   ·  Plan: Home med: Labetalol 100mg qd and Losartan 50mg qd  - c/w home meds on discharge    ·  Problem: Obesity.   ·  Plan: BMI> 35  - Patient uses walker for mobility so cant exercise and would benefit from Nutrition consult.      New medications: (diabetes meds)  Labs to be followed outpatient: A1C in 3 months  Exam to be followed outpatient: n/a    PHYSICAL EXAM:  Constitutional: large body habitus, NAD  HEENT: EOMI, sclera non-icteric, neck supple, trachea midline, no masses  Respiratory: CTA B/L. No crackles/rhonchi. No accessory muscle use.   Cardiovascular: RRR, normal S1S2, no M/R/G  Gastrointestinal: abdomen soft. Suprapubic tenderness to palpation. LLQ ostomy w/ stool output. Suprapubic cath w/o erythema/fluctuance at site. No hematoma/ fluctuance at site.  Extremities: Warm, well perfused, pulses equal bilateral upper and lower extremities, no edema  Neurological: AAOx3, CN Grossly intact. LLE 2/5 strength in knee flexion. Able to move L toes however limited to no passive ROM in L ankle.  RLE +3/5.  Skin: Normal temperature, warm, dry   43y yo Female  with PMH of HTN, HLD, Abdominal infection c/b Necrotizing fascitis (s/p suprapubic catheter and ostomy bag placement on 11/11/21 at [facility]) , who presents to the ED with concerns of fatigue and episodes of night sweats for last few weeks. Patient is being admitted to [facility] for further management of severe sepsis 2/2 complicated cystitis. Urine cx growing enterococcus and klebsiella. Urology recommending exchange of suprapubic cath however since placed 6wks ago too new for them to exchange. IR consulted for   exchange. Course c/b suprapubic cath becoming dislodge, however able to be replaced by patient. CT a/p ordered to eval positioning. SPC in position but planned exchange for larger tube. Tube exchanged, and UCx growing MDR Klebsiella and E. faecalis. Treated with gentamycin. Hemodynamically stable.    Problem List/Main Diagnoses (problem-based):     ·  Problem: Severe sepsis.   ·  Plan: 2/2 complicated urinary tract infection   SIRS Positive for tachycardia and WBC +lactic acidosis >5 with suspected source of infection   - s/p zosyn due to multiple hospitalization and concern for broader coverage( Patient denied any anaphylactic reaction from penicillin), changed to gentamycin and SPC exchanged.  - urine cx growing klebsiella and enterococcus faecalis, treated successfully with gentamycin.      -Enterococcus susceptible to ampicillin  - Urology outpatient follow-up.    ·  Problem: History of creation of ostomy.   ·  Plan: Placed on 11/11/21 at [facility] due to abdominal necrotizing fascitis   - Ostomy care and wound care education   - Patient is interested in having care and here at [facility] and establishing follow up with surgery here for reversal as she told she was informed about the reversal from her surgery team at other hospital but was lost to follow up.  - Surgery contact info provided    ·  Problem: Diabetes.   ·  Plan: A1c of 10 on previous admission   Home med: Admelog 35units premeals and Humulin 160u TID   Hyperglycemic oh admission with FS>500, downtrended afer fluids and Insulin s/q   - A1c 12.5   - No acidosis or AG  - Outpatient regimen____ ->    ·  Problem: Essential hypertension.   ·  Plan: Home med: Labetalol 100mg qd and Losartan 50mg qd  - c/w home meds on discharge    ·  Problem: Obesity.   ·  Plan: BMI> 35  - Ensure added to diet.      New medications: ______  Labs to be followed outpatient: A1C in 3 months  Exam to be followed outpatient: n/a    PHYSICAL EXAM:  Constitutional: large body habitus, NAD  HEENT: EOMI, sclera non-icteric, neck supple, trachea midline, no masses  Respiratory: CTA B/L. No crackles/rhonchi. No accessory muscle use.   Cardiovascular: RRR, normal S1S2, no M/R/G  Gastrointestinal: abdomen soft. Suprapubic tenderness to palpation. LLQ ostomy w/ stool output. Suprapubic cath w/o erythema/fluctuance at site. No hematoma/ fluctuance at site.  Extremities: Warm, well perfused, pulses equal bilateral upper and lower extremities, no edema  Neurological: AAOx3, CN Grossly intact. LLE 2/5 strength in knee flexion. Able to move L toes however limited to no passive ROM in L ankle.  RLE +3/5.  Skin: Normal temperature, warm, dry   43y yo Female  with PMH of HTN, HLD, Abdominal infection c/b Necrotizing fascitis (s/p suprapubic catheter and ostomy bag placement on 11/11/21 at [facility]) , who presents to the ED with concerns of fatigue and episodes of night sweats for last few weeks. Patient is being admitted to [facility] for further management of severe sepsis 2/2 complicated cystitis. Urine cx growing enterococcus and klebsiella. Urology recommending exchange of suprapubic cath however since placed 6wks ago too new for them to exchange. IR consulted for   exchange. Course c/b suprapubic cath becoming dislodge, however able to be replaced by patient. CT a/p ordered to eval positioning. SPC in position but planned exchange for larger tube. Tube exchanged, and UCx growing MDR Klebsiella and E. faecalis. Treated with gentamycin. Hemodynamically stable.    Problem List/Main Diagnoses (problem-based):     ·  Problem: Severe sepsis.   ·  Plan: 2/2 complicated urinary tract infection   SIRS Positive for tachycardia and WBC +lactic acidosis >5 with suspected source of infection   - s/p zosyn due to multiple hospitalization and concern for broader coverage( Patient denied any anaphylactic reaction from penicillin), changed to gentamycin and SPC exchanged.  - urine cx growing klebsiella and enterococcus faecalis, treated successfully with gentamycin.      -Enterococcus susceptible to ampicillin  - Urology outpatient follow-up.    ·  Problem: History of creation of ostomy.   ·  Plan: Placed on 11/11/21 at [facility] due to abdominal necrotizing fascitis   - Ostomy care and wound care education   - Patient is interested in having care and here at [facility] and establishing follow up with surgery here for reversal as she told she was informed about the reversal from her surgery team at other hospital but was lost to follow up.  - Surgery contact info provided    ·  Problem: Diabetes.   ·  Plan: A1c of 10 on previous admission   Home med: Admelog 35units premeals and Humulin 160u TID   Hyperglycemic oh admission with FS>500, downtrended afer fluids and Insulin s/q   - A1c 12.5   - No acidosis or AG  - Outpatient regimen->  uHumulin 160 TID before meals. and Admelog 35Units if 2 hour postprandial glucose >200.     ·  Problem: Essential hypertension.   ·  Plan: Home med: Labetalol 100mg qd and Losartan 50mg qd  - c/w home meds on discharge    ·  Problem: Obesity.   ·  Plan: BMI> 35  - Ensure added to diet.      New medications: N/A  Labs to be followed outpatient: A1C in 3 months  Exam to be followed outpatient: n/a    PHYSICAL EXAM:  Constitutional: large body habitus, NAD  HEENT: EOMI, sclera non-icteric, neck supple, trachea midline, no masses  Respiratory: CTA B/L. No crackles/rhonchi. No accessory muscle use.   Cardiovascular: RRR, normal S1S2, no M/R/G  Gastrointestinal: abdomen soft. Suprapubic tenderness to palpation. LLQ ostomy w/ stool output. Suprapubic cath w/o erythema/fluctuance at site. No hematoma/ fluctuance at site.  Extremities: Warm, well perfused, pulses equal bilateral upper and lower extremities, no edema  Neurological: AAOx3, CN Grossly intact. LLE 2/5 strength in knee flexion. Able to move L toes however limited to no passive ROM in L ankle.  RLE +3/5.  Skin: Normal temperature, warm, dry

## 2023-03-21 NOTE — PROGRESS NOTE ADULT - PROBLEM SELECTOR PLAN 2
Placed on 11/11/21 at [facility] due to abdominal necrotizing fascitis   - Ostomy care and wound care education   - Patient is interested in having care and here at [facility] and establishing follow up with surgery here for reversal as she told she was informed about the reversal from her surgery team at other hospital but was lost to follow up

## 2023-03-21 NOTE — PROGRESS NOTE ADULT - SUBJECTIVE AND OBJECTIVE BOX
INTERVAL HPI/OVERNIGHT EVENTS:  O/N: Patient afebrile with stable vitals overnight.   This morning: Patient was seen and examined at bedside. Pain better controlled. Urology recommending suprapubic catheter exchange. Enterococcus susceptible to ampicillin. Klebsiella susceptibilities pending.     VITAL SIGNS:  T(F): 98.3 (03-21-23 @ 12:52)  HR: 91 (03-21-23 @ 12:52)  BP: 107/66 (03-21-23 @ 12:52)  RR: 17 (03-21-23 @ 12:52)  SpO2: 96% (03-21-23 @ 12:52)  Wt(kg): --    PHYSICAL EXAM:    Constitutional: NAD  HEENT: EOMI, sclera non-icteric, neck supple, trachea midline, no masses  Respiratory: Mild bilateral expiratory wheezing. No crackles/rhonchi. No accessory muscle use.   Cardiovascular: RRR, normal S1S2, no M/R/G  Gastrointestinal: abdomen soft. Suprapubic tenderness to palpation. LLQ ostomy w/ stool output. Suprapubic cath w/o erythema/fluctuance at site. Suprapubic cath outputting urine  Extremities: Warm, well perfused, pulses equal bilateral upper and lower extremities, no edema  Neurological: AAOx3, CN Grossly intact. LLE 2/5 strength in knee flexion. Able to move L toes however limited to no passive ROM in L ankle.   Skin: Normal temperature, warm, dry    MEDICATIONS  (STANDING):  acetaminophen     Tablet .. 975 milliGRAM(s) Oral every 8 hours  budesonide 160 MICROgram(s)/formoterol 4.5 MICROgram(s) Inhaler 2 Puff(s) Inhalation two times a day  dextrose 5%. 1000 milliLiter(s) (100 mL/Hr) IV Continuous <Continuous>  dextrose 5%. 1000 milliLiter(s) (50 mL/Hr) IV Continuous <Continuous>  dextrose 50% Injectable 25 Gram(s) IV Push once  dextrose 50% Injectable 12.5 Gram(s) IV Push once  dextrose 50% Injectable 25 Gram(s) IV Push once  glucagon  Injectable 1 milliGRAM(s) IntraMuscular once  insulin glargine Injectable (LANTUS) 20 Unit(s) SubCutaneous at bedtime  insulin lispro (ADMELOG) corrective regimen sliding scale   SubCutaneous Before meals and at bedtime  insulin lispro Injectable (ADMELOG) 30 Unit(s) SubCutaneous three times a day before meals  ketorolac   Injectable 15 milliGRAM(s) IV Push every 8 hours  losartan 50 milliGRAM(s) Oral daily  oxybutynin 5 milliGRAM(s) Oral every 8 hours  piperacillin/tazobactam IVPB.. 4.5 Gram(s) IV Intermittent every 8 hours  sodium chloride 0.9%. 1000 milliLiter(s) (60 mL/Hr) IV Continuous <Continuous>    MEDICATIONS  (PRN):  albuterol/ipratropium for Nebulization 3 milliLiter(s) Nebulizer every 6 hours PRN Shortness of Breath and/or Wheezing  benzocaine/menthol Lozenge 1 Lozenge Oral every 8 hours PRN Sore Throat  benzonatate 100 milliGRAM(s) Oral every 8 hours PRN Cough  dextrose Oral Gel 15 Gram(s) Oral once PRN Blood Glucose LESS THAN 70 milliGRAM(s)/deciliter  HYDROmorphone  Injectable 1 milliGRAM(s) IV Push every 4 hours PRN Severe Pain (7 - 10)      Allergies    amoxicillin (Unknown)  clindamycin (Pruritus)  fish (Hives; Urticaria)  iodine containing compounds (Hives; Anaphylaxis)  penicillin (Hives)  shellfish. (Hives; Anaphylaxis)    Intolerances    Bactrim I.V. (Rash (Mod to Severe))      LABS:                        11.2   6.47  )-----------( 365      ( 21 Mar 2023 05:30 )             37.2     03-21    136  |  102  |  9   ----------------------------<  248<H>  3.9   |  26  |  0.61    Ca    8.7      21 Mar 2023 05:30  Phos  3.5     03-21  Mg     1.8     03-21    TPro  6.8  /  Alb  2.9<L>  /  TBili  <0.2  /  DBili  x   /  AST  12  /  ALT  9<L>  /  AlkPhos  134<H>  03-21    PT/INR - ( 21 Mar 2023 05:30 )   PT: 13.3 sec;   INR: 1.12          PTT - ( 21 Mar 2023 05:30 )  PTT:29.1 sec            RADIOLOGY & ADDITIONAL TESTS:  Reviewed

## 2023-03-21 NOTE — PROGRESS NOTE ADULT - PROBLEM SELECTOR PLAN 1
2/2 complicated urinary tract infection   SIRS Positive for tachycardia and WBC +lactic acidosis >5 with suspected source of infection   - c/w zosyn due to multiple hospitalization and concern for broader coverage( Patient denied any anaphylactic reaction from penicillin)   - urine cx growing klebsiella and enterococcus faecalis     -Enterococcus susceptible to ampicillin  - WBC downtrending and no fevers  - IR consult for suprapubic cath exchange; Pending CT abd w/o contrast 2/2 Cystitis complicated by suprapubic catheter   SIRS Positive for tachycardia and WBC +lactic acidosis >5 with suspected source of infection   - c/w zosyn due to multiple hospitalization and concern for broader coverage( Patient denied any anaphylactic reaction from penicillin)   - urine cx growing klebsiella and enterococcus faecalis     -Enterococcus susceptible to ampicillin  - WBC downtrending and no fevers  - IR consult for suprapubic cath exchange; Pending CT abd w/o contrast

## 2023-03-21 NOTE — DISCHARGE NOTE PROVIDER - NSDCMRMEDTOKEN_GEN_ALL_CORE_FT
Admelo unit(s) injectable 3 times a day (before meals)  HumuLIN N KwikPen: 200 unit(s) subcutaneous 3 times  ibuprofen 800 mg oral tablet: 1 cap(s) orally once a day   labetalol 100 mg oral tablet: 1 dose(s) orally once a day  losartan 50 mg oral tablet: 1 tab(s) orally once a day MDD:1  Ventolin HFA 90 mcg/inh inhalation aerosol: 1 puff(s) inhaled as needed.    Admelo unit(s) injectable 3 times a day (before meals)  HumuLIN N KwikPen: 200 unit(s) subcutaneous 3 times  ibuprofen 800 mg oral tablet: 1 cap(s) orally once a day   labetalol 100 mg oral tablet: 1 dose(s) orally once a day  losartan 50 mg oral tablet: 1 tab(s) orally once a day  Ventolin HFA 90 mcg/inh inhalation aerosol: 1 puff(s) inhaled as needed.    Admelo unit(s) injectable 3 times a day (before meals)  HumuLIN N KwikPen: 200 unit(s) subcutaneous 3 times  ibuprofen 800 mg oral tablet: 1 cap(s) orally once a day   labetalol 100 mg oral tablet: 1 dose(s) orally once a day  losartan 50 mg oral tablet: 1 tab(s) orally once a day  oxyCODONE 10 mg oral tablet: 1 tab(s) orally every 6 hours, As Needed -for severe pain MDD:40mg   Ventolin HFA 90 mcg/inh inhalation aerosol: 1 puff(s) inhaled as needed.    Admelo unit(s) injectable 3 times a day (before meals)  diphenhydrAMINE 50 mg oral tablet: 1 tab(s) orally once a day as needed for  allergy symptoms  HumuLIN N KwikPen: 200 unit(s) subcutaneous 3 times  ibuprofen 400 mg oral tablet: 1 tab(s) orally 3 times a day as needed for  mild pain  labetalol 100 mg oral tablet: 1 dose(s) orally once a day  losartan 50 mg oral tablet: 1 tab(s) orally once a day  oxyCODONE 10 mg oral tablet: 1 tab(s) orally every 6 hours, As Needed -for severe pain MDD:40mg   Ventolin HFA 90 mcg/inh inhalation aerosol: 1 puff(s) inhaled as needed.    Admelo unit(s) injectable 3 times a day (before meals)  diphenhydrAMINE 50 mg oral tablet: 1 tab(s) orally once a day as needed for  allergy symptoms  HumuLIN N KwikPen: 160 unit(s) subcutaneous 3 times  ibuprofen 400 mg oral tablet: 1 tab(s) orally 3 times a day as needed for  mild pain  labetalol 100 mg oral tablet: 1 dose(s) orally once a day  losartan 50 mg oral tablet: 1 tab(s) orally once a day  oxyCODONE 10 mg oral tablet: 1 tab(s) orally every 6 hours, As Needed -for severe pain MDD:40mg   Ventolin HFA 90 mcg/inh inhalation aerosol: 1 puff(s) inhaled as needed.

## 2023-03-21 NOTE — DISCHARGE NOTE PROVIDER - CARE PROVIDER_API CALL
Pelon Griffiths)  Urology  [address] [place] [address]  Phone: [phone]  Fax: [phone]  Follow Up Time: 1 month   Pelon Griffiths)  Urology  [address], [address] [place] [address]  Phone: [phone]  Fax: [phone]  Scheduled Appointment: 04/10/2023 11:00 AM   Pelon Griffiths)  Urology  [address], [address] [place] [address]  Phone: [phone]  Fax: [phone]  Scheduled Appointment: 04/10/2023 11:00 AM    Derek Cueto)  Surgery  [address], [address]  [place] [address]  Phone: [phone]  Fax: [phone]  Follow Up Time: Routine   Pelon Griffiths)  Urology  [address], [address] [place] [address]  Phone: [phone]  Fax: [phone]  Scheduled Appointment: 04/10/2023 11:00 AM    Derek Cueto)  Surgery  [address], [address]  [place] [address]  Phone: [phone]  Fax: [phone]  Follow Up Time: Routine    Nisha Joseph)  EndocrinologyMetabDiabetes; Internal Medicine  [address] [place] [address]  Phone: [phone]  Fax: [phone]  Established Patient  Follow Up Time: 1 month   Pelon Griffiths)  Urology  [address], [address] [place] [address]  Phone: [phone]  Fax: [phone]  Scheduled Appointment: 04/10/2023 11:00 AM    Derek Cueto)  Surgery  [address], [address]  [place] [address]  Phone: [phone]  Fax: [phone]  Follow Up Time: Routine    Nisha Joseph)  EndocrinologyMetabDiabetes; Internal Medicine  [address] [address]  Phone: [phone]  Fax: [phone]  Established Patient  Follow Up Time: 1 month    Pelon Sánchez  INFECTIOUS DISEASE  [facility], [address] [place] [address]  Phone: [phone]  Fax: [phone]  Established Patient  Scheduled Appointment: 05/01/2023   Pelon Griffiths)  Urology  [address] [place] [address]  Phone: [phone]  Fax: [phone]  Scheduled Appointment: 04/10/2023 11:00 AM    Derek Cueto)  Surgery  [address]  [place] [address]  Phone: [phone]  Fax: [phone]  Follow Up Time: Routine    Nisha Joseph)  EndocrinologyMetabDiabetes; Internal Medicine  [address] [place] [address]  Phone: [phone]  Fax: [phone]  Established Patient  Follow Up Time: 1 month    Pelon Sánchez  INFECTIOUS DISEASE  [facility], [address] [place] [address]  Phone: [phone]  Fax: [phone]  Established Patient  Scheduled Appointment: 04/03/2023 01:30 PM

## 2023-03-21 NOTE — PROGRESS NOTE ADULT - SUBJECTIVE AND OBJECTIVE BOX
OVERNIGHT: No acute events overnight.   SUBJECTIVE: Patient was seen and examined this morning. Continues to have cough. She has many diet limitation based on preference, but is eating well. Blood glucose improving. Pending suprapubic catheter exchange.    CAPILLARY BLOOD GLUCOSE & INSULIN RECEIVED  185 mg/dL (03-21 @ 17:56)  177 mg/dL (03-21 @ 12:55)  208 mg/dL (03-21 @ 08:41) - Lispro 30  190 mg/dL (03-20 @ 22:07) - Lantus 20 + lispro 3  306 mg/dL (3-20 @ dinner) - Lispro 30 + 12  Lantus 50 units at 2PM    REVIEW OF SYSTEMS  Constitutional:  (+) Decreased appetite. Negative fever, chills.  Cardiovascular:  Negative for chest pain or palpitations.  Respiratory:  Negative for cough, wheezing, or shortness of breath.    Gastrointestinal:  (+) Abdominal pain. Negative for nausea, vomiting, diarrhea, constipation.  Neurologic:  No headache, confusion, dizziness, lightheadedness.    PHYSICAL EXAM  Vital Signs Last 24 Hrs  T(C): 36.8 (21 Mar 2023 20:20), Max: 37.1 (21 Mar 2023 04:08)  T(F): 98.3 (21 Mar 2023 20:20), Max: 98.7 (21 Mar 2023 04:08)  HR: 81 (21 Mar 2023 20:20) (81 - 98)  BP: 124/78 (21 Mar 2023 20:20) (107/66 - 155/92)  BP(mean): --  RR: 18 (21 Mar 2023 20:20) (16 - 18)  SpO2: 98% (21 Mar 2023 20:20) (95% - 98%)    Parameters below as of 21 Mar 2023 20:20  Patient On (Oxygen Delivery Method): room air    Constitutional: Awake, alert, female, in no acute distress. OBese.  HEENT: Normocephalic, atraumatic, NAOMI.  Respiratory: Lungs clear to ausculation bilaterally.   Cardiovascular: regular rhythm, normal S1 and S2, no audible murmurs.   GI: soft, mildly tender, non-distended, (+) Ostomy. (+) Suprapubic catheter.   Extremities: No lower extremity edema.  Psychiatric: AAO x 3. Normal affect/mood.     LABS  CBC - WBC/HGB/HTC/PLT: 6.47/11.2/37.2/365 (03-21-23)  BMP - Na/K/Cl/Bicarb/BUN/Cr/Gluc: 136/3.9/102/26/9/0.61/248 (03-21-23)  Anion Gap: 8 (03-21-23)  eGFR: 114 (03-21-23)  Calcium: 8.7 (03-21-23)  Phosphorus: 3.5 (03-21-23)  Magnesium: 1.8 (03-21-23)  LFT - Alb/Tprot/Tbili/Dbili/AlkPhos/ALT/AST: 2.9/--/<0.2/--/134/9/12 (03-21-23)  PT/aPTT/INR: 13.3/29.1/1.12 (03-21-23)         MEDICATIONS  MEDICATIONS  (STANDING):  acetaminophen     Tablet .. 975 milliGRAM(s) Oral every 8 hours  dextrose 5%. 1000 milliLiter(s) (100 mL/Hr) IV Continuous <Continuous>  dextrose 5%. 1000 milliLiter(s) (50 mL/Hr) IV Continuous <Continuous>  dextrose 50% Injectable 25 Gram(s) IV Push once  dextrose 50% Injectable 12.5 Gram(s) IV Push once  dextrose 50% Injectable 25 Gram(s) IV Push once  glucagon  Injectable 1 milliGRAM(s) IntraMuscular once  insulin glargine Injectable (LANTUS) 60 Unit(s) SubCutaneous at bedtime  insulin lispro (ADMELOG) corrective regimen sliding scale   SubCutaneous Before meals and at bedtime  insulin lispro Injectable (ADMELOG) 36 Unit(s) SubCutaneous three times a day before meals  ketorolac   Injectable 15 milliGRAM(s) IV Push every 8 hours  losartan 50 milliGRAM(s) Oral daily  montelukast 10 milliGRAM(s) Oral every 24 hours  oxybutynin 5 milliGRAM(s) Oral every 8 hours  piperacillin/tazobactam IVPB.. 4.5 Gram(s) IV Intermittent every 8 hours  sodium chloride 0.9%. 1000 milliLiter(s) (60 mL/Hr) IV Continuous <Continuous>    MEDICATIONS  (PRN):  albuterol/ipratropium for Nebulization 3 milliLiter(s) Nebulizer every 6 hours PRN Shortness of Breath and/or Wheezing  benzocaine/menthol Lozenge 1 Lozenge Oral every 8 hours PRN Sore Throat  benzonatate 100 milliGRAM(s) Oral every 8 hours PRN Cough  dextrose Oral Gel 15 Gram(s) Oral once PRN Blood Glucose LESS THAN 70 milliGRAM(s)/deciliter  HYDROmorphone  Injectable 1 milliGRAM(s) IV Push every 4 hours PRN Severe Pain (7 - 10)

## 2023-03-21 NOTE — PROGRESS NOTE ADULT - ATTENDING COMMENTS
There are no Wet Read(s) to document. Pt. seen and examined by me earlier today; I have read Dr. Whitman's note, I agree w/ his findings and plan of care as documented; Pt. clinically improving on Zosyn, UCx growing Enterococcus; Urology and IR following, plan for CT A/P and SPT exchange during admission; monitor blood glucose, insulin regimen per Endocrine; cont. ostomy care

## 2023-03-21 NOTE — PROGRESS NOTE ADULT - PROBLEM SELECTOR PLAN 1
Type 2 diabetes  - Please give Lantus 60 units tonight. Will decide about AM dose in the morning  - Increase lispro 36 to units before each meal.  - Change Lispro insulin correctional scale to regular moderate scale starting at FSG 150mg/dL (2,4,6,8,10,12)  - Patient's fingerstick glucose goal is 100-180 mg/dL.    - Patient can follow up at discharge with Dr. Joseph at [facility] by calling [phone] to make an appointment.      Case discussed with Dr. Bowden. Primary team updated.

## 2023-03-21 NOTE — DISCHARGE NOTE PROVIDER - CARE PROVIDERS DIRECT ADDRESSES
,[email or web address],[email or web address],DirectAddress_Unknown

## 2023-03-21 NOTE — PROGRESS NOTE ADULT - PROBLEM SELECTOR PLAN 3
A1c of 10 on previous admission   Home med: Admelog 35units premeals and Humulin 160u TID   Hyperglycemic oh admission with FS>500, downtrended afer fluids and Insulin s/q   - A1c 12.5   - No acidosis or AG  - Endocrinology consulted; recommending custom ISS  - Redose lantus in PM  - Admelog 30units pre-meals   - FS q4-q6 for now

## 2023-03-22 LAB
-  AMPICILLIN/SULBACTAM: SIGNIFICANT CHANGE UP
-  AMPICILLIN: SIGNIFICANT CHANGE UP
-  CEFAZOLIN: SIGNIFICANT CHANGE UP
-  CEFTRIAXONE: SIGNIFICANT CHANGE UP
-  CIPROFLOXACIN: SIGNIFICANT CHANGE UP
-  ERTAPENEM: SIGNIFICANT CHANGE UP
-  GENTAMICIN: SIGNIFICANT CHANGE UP
-  MEROPENEM: SIGNIFICANT CHANGE UP
-  PIPERACILLIN/TAZOBACTAM: SIGNIFICANT CHANGE UP
-  TOBRAMYCIN: SIGNIFICANT CHANGE UP
-  TRIMETHOPRIM/SULFAMETHOXAZOLE: SIGNIFICANT CHANGE UP
ANION GAP SERPL CALC-SCNC: 11 MMOL/L — SIGNIFICANT CHANGE UP (ref 5–17)
APTT BLD: 29.5 SEC — SIGNIFICANT CHANGE UP (ref 27.5–35.5)
BASOPHILS # BLD AUTO: 0.03 K/UL — SIGNIFICANT CHANGE UP (ref 0–0.2)
BASOPHILS NFR BLD AUTO: 0.4 % — SIGNIFICANT CHANGE UP (ref 0–2)
BUN SERPL-MCNC: 12 MG/DL — SIGNIFICANT CHANGE UP (ref 7–23)
CALCIUM SERPL-MCNC: 8.6 MG/DL — SIGNIFICANT CHANGE UP (ref 8.4–10.5)
CHLORIDE SERPL-SCNC: 100 MMOL/L — SIGNIFICANT CHANGE UP (ref 96–108)
CO2 SERPL-SCNC: 24 MMOL/L — SIGNIFICANT CHANGE UP (ref 22–31)
CREAT SERPL-MCNC: 0.65 MG/DL — SIGNIFICANT CHANGE UP (ref 0.5–1.3)
CULTURE RESULTS: SIGNIFICANT CHANGE UP
EGFR: 112 ML/MIN/1.73M2 — SIGNIFICANT CHANGE UP
EOSINOPHIL # BLD AUTO: 0.19 K/UL — SIGNIFICANT CHANGE UP (ref 0–0.5)
EOSINOPHIL NFR BLD AUTO: 2.6 % — SIGNIFICANT CHANGE UP (ref 0–6)
GLUCOSE BLDC GLUCOMTR-MCNC: 109 MG/DL — HIGH (ref 70–99)
GLUCOSE BLDC GLUCOMTR-MCNC: 222 MG/DL — HIGH (ref 70–99)
GLUCOSE BLDC GLUCOMTR-MCNC: 230 MG/DL — HIGH (ref 70–99)
GLUCOSE BLDC GLUCOMTR-MCNC: 230 MG/DL — HIGH (ref 70–99)
GLUCOSE BLDC GLUCOMTR-MCNC: 96 MG/DL — SIGNIFICANT CHANGE UP (ref 70–99)
GLUCOSE SERPL-MCNC: 271 MG/DL — HIGH (ref 70–99)
HCT VFR BLD CALC: 38.1 % — SIGNIFICANT CHANGE UP (ref 34.5–45)
HGB BLD-MCNC: 11.4 G/DL — LOW (ref 11.5–15.5)
IMM GRANULOCYTES NFR BLD AUTO: 0.7 % — SIGNIFICANT CHANGE UP (ref 0–0.9)
INR BLD: 1.02 — SIGNIFICANT CHANGE UP (ref 0.88–1.16)
LYMPHOCYTES # BLD AUTO: 1.9 K/UL — SIGNIFICANT CHANGE UP (ref 1–3.3)
LYMPHOCYTES # BLD AUTO: 26.3 % — SIGNIFICANT CHANGE UP (ref 13–44)
MAGNESIUM SERPL-MCNC: 1.7 MG/DL — SIGNIFICANT CHANGE UP (ref 1.6–2.6)
MCHC RBC-ENTMCNC: 24.3 PG — LOW (ref 27–34)
MCHC RBC-ENTMCNC: 29.9 GM/DL — LOW (ref 32–36)
MCV RBC AUTO: 81.1 FL — SIGNIFICANT CHANGE UP (ref 80–100)
METHOD TYPE: SIGNIFICANT CHANGE UP
METHOD TYPE: SIGNIFICANT CHANGE UP
MONOCYTES # BLD AUTO: 0.46 K/UL — SIGNIFICANT CHANGE UP (ref 0–0.9)
MONOCYTES NFR BLD AUTO: 6.4 % — SIGNIFICANT CHANGE UP (ref 2–14)
NEUTROPHILS # BLD AUTO: 4.6 K/UL — SIGNIFICANT CHANGE UP (ref 1.8–7.4)
NEUTROPHILS NFR BLD AUTO: 63.6 % — SIGNIFICANT CHANGE UP (ref 43–77)
NRBC # BLD: 0 /100 WBCS — SIGNIFICANT CHANGE UP (ref 0–0)
ORGANISM # SPEC MICROSCOPIC CNT: SIGNIFICANT CHANGE UP
PHOSPHATE SERPL-MCNC: 3.4 MG/DL — SIGNIFICANT CHANGE UP (ref 2.5–4.5)
PLATELET # BLD AUTO: 402 K/UL — HIGH (ref 150–400)
POTASSIUM SERPL-MCNC: 4 MMOL/L — SIGNIFICANT CHANGE UP (ref 3.5–5.3)
POTASSIUM SERPL-SCNC: 4 MMOL/L — SIGNIFICANT CHANGE UP (ref 3.5–5.3)
PROTHROM AB SERPL-ACNC: 12.1 SEC — SIGNIFICANT CHANGE UP (ref 10.5–13.4)
RBC # BLD: 4.7 M/UL — SIGNIFICANT CHANGE UP (ref 3.8–5.2)
RBC # FLD: 14.8 % — HIGH (ref 10.3–14.5)
SODIUM SERPL-SCNC: 135 MMOL/L — SIGNIFICANT CHANGE UP (ref 135–145)
SPECIMEN SOURCE: SIGNIFICANT CHANGE UP
WBC # BLD: 7.23 K/UL — SIGNIFICANT CHANGE UP (ref 3.8–10.5)
WBC # FLD AUTO: 7.23 K/UL — SIGNIFICANT CHANGE UP (ref 3.8–10.5)

## 2023-03-22 PROCEDURE — 99232 SBSQ HOSP IP/OBS MODERATE 35: CPT

## 2023-03-22 PROCEDURE — 99233 SBSQ HOSP IP/OBS HIGH 50: CPT | Mod: GC

## 2023-03-22 PROCEDURE — 99222 1ST HOSP IP/OBS MODERATE 55: CPT

## 2023-03-22 RX ORDER — HYDROMORPHONE HYDROCHLORIDE 2 MG/ML
1 INJECTION INTRAMUSCULAR; INTRAVENOUS; SUBCUTANEOUS ONCE
Refills: 0 | Status: DISCONTINUED | OUTPATIENT
Start: 2023-03-22 | End: 2023-03-22

## 2023-03-22 RX ORDER — INSULIN LISPRO 100/ML
30 VIAL (ML) SUBCUTANEOUS
Refills: 0 | Status: DISCONTINUED | OUTPATIENT
Start: 2023-03-22 | End: 2023-03-25

## 2023-03-22 RX ORDER — OXYCODONE HYDROCHLORIDE 5 MG/1
5 TABLET ORAL EVERY 4 HOURS
Refills: 0 | Status: DISCONTINUED | OUTPATIENT
Start: 2023-03-22 | End: 2023-03-24

## 2023-03-22 RX ORDER — ONDANSETRON 8 MG/1
4 TABLET, FILM COATED ORAL ONCE
Refills: 0 | Status: COMPLETED | OUTPATIENT
Start: 2023-03-22 | End: 2023-03-22

## 2023-03-22 RX ORDER — MAGNESIUM SULFATE 500 MG/ML
2 VIAL (ML) INJECTION ONCE
Refills: 0 | Status: COMPLETED | OUTPATIENT
Start: 2023-03-22 | End: 2023-03-22

## 2023-03-22 RX ORDER — LABETALOL HCL 100 MG
100 TABLET ORAL EVERY 24 HOURS
Refills: 0 | Status: DISCONTINUED | OUTPATIENT
Start: 2023-03-22 | End: 2023-03-27

## 2023-03-22 RX ORDER — INSULIN GLARGINE 100 [IU]/ML
15 INJECTION, SOLUTION SUBCUTANEOUS ONCE
Refills: 0 | Status: COMPLETED | OUTPATIENT
Start: 2023-03-22 | End: 2023-03-22

## 2023-03-22 RX ORDER — GENTAMICIN SULFATE 40 MG/ML
500 VIAL (ML) INJECTION ONCE
Refills: 0 | Status: COMPLETED | OUTPATIENT
Start: 2023-03-22 | End: 2023-03-22

## 2023-03-22 RX ADMIN — HYDROMORPHONE HYDROCHLORIDE 1 MILLIGRAM(S): 2 INJECTION INTRAMUSCULAR; INTRAVENOUS; SUBCUTANEOUS at 18:30

## 2023-03-22 RX ADMIN — Medication 100 MILLIGRAM(S): at 12:59

## 2023-03-22 RX ADMIN — Medication 5 MILLIGRAM(S): at 14:32

## 2023-03-22 RX ADMIN — HYDROMORPHONE HYDROCHLORIDE 1 MILLIGRAM(S): 2 INJECTION INTRAMUSCULAR; INTRAVENOUS; SUBCUTANEOUS at 18:15

## 2023-03-22 RX ADMIN — Medication 15 MILLIGRAM(S): at 16:15

## 2023-03-22 RX ADMIN — HYDROMORPHONE HYDROCHLORIDE 1 MILLIGRAM(S): 2 INJECTION INTRAMUSCULAR; INTRAVENOUS; SUBCUTANEOUS at 03:41

## 2023-03-22 RX ADMIN — PIPERACILLIN AND TAZOBACTAM 25 GRAM(S): 4; .5 INJECTION, POWDER, LYOPHILIZED, FOR SOLUTION INTRAVENOUS at 01:24

## 2023-03-22 RX ADMIN — Medication 4: at 08:47

## 2023-03-22 RX ADMIN — MONTELUKAST 10 MILLIGRAM(S): 4 TABLET, CHEWABLE ORAL at 14:32

## 2023-03-22 RX ADMIN — Medication 4: at 17:54

## 2023-03-22 RX ADMIN — ONDANSETRON 4 MILLIGRAM(S): 8 TABLET, FILM COATED ORAL at 12:19

## 2023-03-22 RX ADMIN — OXYCODONE HYDROCHLORIDE 5 MILLIGRAM(S): 5 TABLET ORAL at 20:52

## 2023-03-22 RX ADMIN — HYDROMORPHONE HYDROCHLORIDE 1 MILLIGRAM(S): 2 INJECTION INTRAMUSCULAR; INTRAVENOUS; SUBCUTANEOUS at 08:20

## 2023-03-22 RX ADMIN — Medication 5 MILLIGRAM(S): at 07:35

## 2023-03-22 RX ADMIN — PIPERACILLIN AND TAZOBACTAM 25 GRAM(S): 4; .5 INJECTION, POWDER, LYOPHILIZED, FOR SOLUTION INTRAVENOUS at 16:15

## 2023-03-22 RX ADMIN — PIPERACILLIN AND TAZOBACTAM 25 GRAM(S): 4; .5 INJECTION, POWDER, LYOPHILIZED, FOR SOLUTION INTRAVENOUS at 08:05

## 2023-03-22 RX ADMIN — HYDROMORPHONE HYDROCHLORIDE 1 MILLIGRAM(S): 2 INJECTION INTRAMUSCULAR; INTRAVENOUS; SUBCUTANEOUS at 08:05

## 2023-03-22 RX ADMIN — Medication 25 GRAM(S): at 12:08

## 2023-03-22 RX ADMIN — INSULIN GLARGINE 60 UNIT(S): 100 INJECTION, SOLUTION SUBCUTANEOUS at 22:34

## 2023-03-22 RX ADMIN — OXYCODONE HYDROCHLORIDE 5 MILLIGRAM(S): 5 TABLET ORAL at 17:15

## 2023-03-22 RX ADMIN — LOSARTAN POTASSIUM 50 MILLIGRAM(S): 100 TABLET, FILM COATED ORAL at 07:35

## 2023-03-22 RX ADMIN — Medication 4: at 22:35

## 2023-03-22 RX ADMIN — Medication 200 MILLIGRAM(S): at 23:17

## 2023-03-22 RX ADMIN — HYDROMORPHONE HYDROCHLORIDE 1 MILLIGRAM(S): 2 INJECTION INTRAMUSCULAR; INTRAVENOUS; SUBCUTANEOUS at 12:23

## 2023-03-22 RX ADMIN — Medication 36 UNIT(S): at 17:55

## 2023-03-22 RX ADMIN — Medication 3 MILLILITER(S): at 18:16

## 2023-03-22 RX ADMIN — Medication 36 UNIT(S): at 08:48

## 2023-03-22 RX ADMIN — OXYCODONE HYDROCHLORIDE 5 MILLIGRAM(S): 5 TABLET ORAL at 21:52

## 2023-03-22 RX ADMIN — Medication 15 MILLIGRAM(S): at 08:53

## 2023-03-22 RX ADMIN — HYDROMORPHONE HYDROCHLORIDE 1 MILLIGRAM(S): 2 INJECTION INTRAMUSCULAR; INTRAVENOUS; SUBCUTANEOUS at 23:16

## 2023-03-22 RX ADMIN — HYDROMORPHONE HYDROCHLORIDE 1 MILLIGRAM(S): 2 INJECTION INTRAMUSCULAR; INTRAVENOUS; SUBCUTANEOUS at 12:07

## 2023-03-22 RX ADMIN — HYDROMORPHONE HYDROCHLORIDE 1 MILLIGRAM(S): 2 INJECTION INTRAMUSCULAR; INTRAVENOUS; SUBCUTANEOUS at 23:36

## 2023-03-22 RX ADMIN — OXYCODONE HYDROCHLORIDE 5 MILLIGRAM(S): 5 TABLET ORAL at 16:15

## 2023-03-22 RX ADMIN — HYDROMORPHONE HYDROCHLORIDE 1 MILLIGRAM(S): 2 INJECTION INTRAMUSCULAR; INTRAVENOUS; SUBCUTANEOUS at 03:26

## 2023-03-22 NOTE — PROGRESS NOTE ADULT - ATTENDING COMMENTS
Pt. seen and examined by me earlier today; I have read Dr. Corondao's note, I agree w/ his findings and plan of care as documented; f/u , IR, Endocrine, and ID recs; plan for SPT exchange; will transition analgesics to PO regimen

## 2023-03-22 NOTE — CONSULT NOTE ADULT - SUBJECTIVE AND OBJECTIVE BOX
INFECTIOUS DISEASES INITIAL CONSULT NOTE    HPI:  HASMUKH AGUILERA [id number] is a 43y yo Female  with PMH of HTN, HLD, Abdominal infection c/b Necrotizing fascitis (s/p suprapubic catheter and ostomy bag placement on 21 at [facility]) , who presents to the ED with concerns of fatigue and episodes of night sweats for last few weeks. Patient stated that she has been recently having concerns that she has been difficulty taking care of her ostomy bag and concerns for ostomy reversal and was hoping to get supplies and cares from the hospital. Patient Denies fever, chills, chest pain, palpitations, SOB, cough, abdominal pain, nausea, vomiting, diarrhea, constipation, urinary frequency, urgency, or dysuria, headaches, changes in vision, dizziness, numbness, tingling.  Denies recent travel, recent antibiotic use, or sick contacts.      ED vitals: T 99.5F , /min , /93 , RR 16 , 98 O2 % on RA  ED studies: CT Abdomen and Pelvis w/ Oral Cont and w/ IV Cont 4 cm right ovarian cyst.Pigtail suprapubic cystostomy catheter in place in the urinary  bladder. There is diffuse urinary bladder inflammation suggesting cystitis.  ED Interventions: cefepime 2g IV, Flagyl 500mg, morphine 4mg, NS 2L and Insulin lispro 5u      (19 Mar 2023 01:27)      PAST MEDICAL & SURGICAL HISTORY:  Essential hypertension      Hyperlipidemia      Diabetes      Obesity      Abdominal hernia      Pre-eclampsia      H/O LEEP      History of D&amp;C      H/O:       H/O ventral hernia repair      H/O exploratory laparotomy            Review of Systems:   Constitutional, eyes, ENT, cardiovascular, respiratory, gastrointestinal, genitourinary, integumentary, neurological, psychiatric and heme/lymph are otherwise negative other than noted above       ANTIBIOTICS:  MEDICATIONS  (STANDING):  acetaminophen     Tablet .. 975 milliGRAM(s) Oral every 8 hours  dextrose 5%. 1000 milliLiter(s) (100 mL/Hr) IV Continuous <Continuous>  dextrose 5%. 1000 milliLiter(s) (50 mL/Hr) IV Continuous <Continuous>  dextrose 50% Injectable 25 Gram(s) IV Push once  dextrose 50% Injectable 12.5 Gram(s) IV Push once  dextrose 50% Injectable 25 Gram(s) IV Push once  glucagon  Injectable 1 milliGRAM(s) IntraMuscular once  insulin glargine Injectable (LANTUS) 60 Unit(s) SubCutaneous at bedtime  insulin lispro (ADMELOG) corrective regimen sliding scale   SubCutaneous Before meals and at bedtime  insulin lispro Injectable (ADMELOG) 36 Unit(s) SubCutaneous three times a day before meals  ketorolac   Injectable 15 milliGRAM(s) IV Push every 8 hours  labetalol 100 milliGRAM(s) Oral every 24 hours  losartan 50 milliGRAM(s) Oral daily  montelukast 10 milliGRAM(s) Oral every 24 hours  oxybutynin 5 milliGRAM(s) Oral every 8 hours  piperacillin/tazobactam IVPB.. 4.5 Gram(s) IV Intermittent every 8 hours  sodium chloride 0.9%. 1000 milliLiter(s) (60 mL/Hr) IV Continuous <Continuous>    MEDICATIONS  (PRN):  albuterol/ipratropium for Nebulization 3 milliLiter(s) Nebulizer every 6 hours PRN Shortness of Breath and/or Wheezing  benzocaine/menthol Lozenge 1 Lozenge Oral every 8 hours PRN Sore Throat  benzonatate 100 milliGRAM(s) Oral every 8 hours PRN Cough  dextrose Oral Gel 15 Gram(s) Oral once PRN Blood Glucose LESS THAN 70 milliGRAM(s)/deciliter  oxyCODONE    IR 5 milliGRAM(s) Oral every 4 hours PRN Severe Pain (7 - 10)      Allergies    amoxicillin (Unknown)  clindamycin (Pruritus)  fish (Hives; Urticaria)  iodine containing compounds (Hives; Anaphylaxis)  penicillin (Hives)  shellfish. (Hives; Anaphylaxis)    Intolerances    Bactrim I.V. (Rash (Mod to Severe))      SOCIAL HISTORY:    FAMILY HISTORY:  FH: diabetes mellitus  father and mother    FH: hypertension  father and mother     no FH leading to current infection    Vital Signs Last 24 Hrs  T(C): 36.5 (22 Mar 2023 12:26), Max: 37.1 (21 Mar 2023 23:55)  T(F): 97.7 (22 Mar 2023 12:26), Max: 98.7 (21 Mar 2023 23:55)  HR: 88 (22 Mar 2023 12:26) (81 - 98)  BP: 127/81 (22 Mar 2023 12:26) (124/78 - 148/80)  BP(mean): --  RR: 16 (22 Mar 2023 12:26) (16 - 18)  SpO2: 98% (22 Mar 2023 12:26) (97% - 98%)    Parameters below as of 22 Mar 2023 12:26  Patient On (Oxygen Delivery Method): room air        23 @ 07:01  -  23 @ 07:00  --------------------------------------------------------  IN: 600 mL / OUT: 2750 mL / NET: -2150 mL    23 @ 07:01  -  23 @ 15:23  --------------------------------------------------------  IN: 550 mL / OUT: 350 mL / NET: 200 mL        PHYSICAL EXAM:  Constitutional: alert, NAD  Eyes: the sclera and conjunctiva were normal.   ENT: the ears and nose were normal in appearance.   Neck: the appearance of the neck was normal and the neck was supple.   Pulmonary: no respiratory distress and lungs were clear to auscultation bilaterally.   Heart: heart rate was normal and rhythm regular, normal S1 and S2  Vascular:. there was no peripheral edema  Abdomen: normal bowel sounds, soft, non-tender  Neurological: no focal deficits.   Psychiatric: the affect was normal      LABS:                        11.4   7.23  )-----------( 402      ( 22 Mar 2023 05:30 )             38.1         135  |  100  |  12  ----------------------------<  271<H>  4.0   |  24  |  0.65    Ca    8.6      22 Mar 2023 05:30  Phos  3.4       Mg     1.7     22    TPro  6.8  /  Alb  2.9<L>  /  TBili  <0.2  /  DBili  x   /  AST  12  /  ALT  9<L>  /  AlkPhos  134<H>  03-21    PT/INR - ( 22 Mar 2023 05:30 )   PT: 12.1 sec;   INR: 1.02          PTT - ( 22 Mar 2023 05:30 )  PTT:29.5 sec      MICROBIOLOGY:    RADIOLOGY & ADDITIONAL STUDIES:   INFECTIOUS DISEASES INITIAL CONSULT NOTE    HPI:  HASMUKH AGUILERA [id number] is a 43F  with PMH of HTN, HLD, Abdominal infection c/b Necrotizing fascitis (s/p suprapubic catheter and ostomy bag placement on 21 at [facility]) , who presents to the ED with concerns of fatigue and episodes of night sweats for last few weeks. Patient stated that she has been recently having concerns that she has been difficulty taking care of her ostomy bag and concerns for ostomy reversal and was hoping to get supplies and cares from the hospital. Patient Denies fever, chills, chest pain, palpitations, SOB, cough, abdominal pain, nausea, vomiting, diarrhea, constipation, urinary frequency, urgency, or dysuria, headaches, changes in vision, dizziness, numbness, tingling.  Denies recent travel, recent antibiotic use, or sick contacts.    Patient with extensive surgical history, necrotizing fascitis s/p abd inf required multiple skin graft procedures, patient c/o tenderness to palpation in the area, chronic in nature. Patient also notes chronic holley exchange, usually q6 wks, notes no exchange performed >2months. Noticed increased amounts of sediment via suprapubic catheter. WBC mildly elevated on admission, now downtrending s/p cefepime, flagyl, zosyn. Cr stable. Culture data revealed fluid collected via suprapubic catheter with Klebsiella pneumoniae (carbapenem resistant) and enterococcus faecalis. BCx without growth x 3d. Patient notes allergic reactions to vancomycin, amoxicillin, causing a generalized erythema and ?desquamation.     ROS negative unless otherwise stated above.    ED vitals: T 99.5F , /min , /93 , RR 16 , 98 O2 % on RA  ED studies: CT Abdomen and Pelvis w/ Oral Cont and w/ IV Cont 4 cm right ovarian cyst. Pigtail suprapubic cystostomy catheter in place in the urinary  bladder. There is diffuse urinary bladder inflammation suggesting cystitis.  ED Interventions: cefepime 2g IV, Flagyl 500mg, morphine 4mg, NS 2L and Insulin lispro 5u      (19 Mar 2023 01:27)      PAST MEDICAL & SURGICAL HISTORY:  Essential hypertension      Hyperlipidemia      Diabetes      Obesity      Abdominal hernia      Pre-eclampsia      H/O LEEP      History of D&amp;C      H/O:       H/O ventral hernia repair      H/O exploratory laparotomy            Review of Systems:   Constitutional, eyes, ENT, cardiovascular, respiratory, gastrointestinal, genitourinary, integumentary, neurological, psychiatric and heme/lymph are otherwise negative other than noted above       ANTIBIOTICS:  MEDICATIONS  (STANDING):  acetaminophen     Tablet .. 975 milliGRAM(s) Oral every 8 hours  dextrose 5%. 1000 milliLiter(s) (100 mL/Hr) IV Continuous <Continuous>  dextrose 5%. 1000 milliLiter(s) (50 mL/Hr) IV Continuous <Continuous>  dextrose 50% Injectable 25 Gram(s) IV Push once  dextrose 50% Injectable 12.5 Gram(s) IV Push once  dextrose 50% Injectable 25 Gram(s) IV Push once  glucagon  Injectable 1 milliGRAM(s) IntraMuscular once  insulin glargine Injectable (LANTUS) 60 Unit(s) SubCutaneous at bedtime  insulin lispro (ADMELOG) corrective regimen sliding scale   SubCutaneous Before meals and at bedtime  insulin lispro Injectable (ADMELOG) 36 Unit(s) SubCutaneous three times a day before meals  ketorolac   Injectable 15 milliGRAM(s) IV Push every 8 hours  labetalol 100 milliGRAM(s) Oral every 24 hours  losartan 50 milliGRAM(s) Oral daily  montelukast 10 milliGRAM(s) Oral every 24 hours  oxybutynin 5 milliGRAM(s) Oral every 8 hours  piperacillin/tazobactam IVPB.. 4.5 Gram(s) IV Intermittent every 8 hours  sodium chloride 0.9%. 1000 milliLiter(s) (60 mL/Hr) IV Continuous <Continuous>    MEDICATIONS  (PRN):  albuterol/ipratropium for Nebulization 3 milliLiter(s) Nebulizer every 6 hours PRN Shortness of Breath and/or Wheezing  benzocaine/menthol Lozenge 1 Lozenge Oral every 8 hours PRN Sore Throat  benzonatate 100 milliGRAM(s) Oral every 8 hours PRN Cough  dextrose Oral Gel 15 Gram(s) Oral once PRN Blood Glucose LESS THAN 70 milliGRAM(s)/deciliter  oxyCODONE    IR 5 milliGRAM(s) Oral every 4 hours PRN Severe Pain (7 - 10)      Allergies    amoxicillin (Unknown)  clindamycin (Pruritus)  fish (Hives; Urticaria)  iodine containing compounds (Hives; Anaphylaxis)  penicillin (Hives)  shellfish. (Hives; Anaphylaxis)    Intolerances    Bactrim I.V. (Rash (Mod to Severe))      SOCIAL HISTORY:    FAMILY HISTORY:  FH: diabetes mellitus  father and mother    FH: hypertension  father and mother     no FH leading to current infection    Vital Signs Last 24 Hrs  T(C): 36.5 (22 Mar 2023 12:26), Max: 37.1 (21 Mar 2023 23:55)  T(F): 97.7 (22 Mar 2023 12:), Max: 98.7 (21 Mar 2023 23:55)  HR: 88 (22 Mar 2023 12:) (81 - 98)  BP: 127/81 (22 Mar 2023 12:) (124/78 - 148/80)  BP(mean): --  RR: 16 (22 Mar 2023 12:) (16 - 18)  SpO2: 98% (22 Mar 2023 12:) (97% - 98%)    Parameters below as of 22 Mar 2023 12:26  Patient On (Oxygen Delivery Method): room air        23 @ 07:01  23 @ 07:00  --------------------------------------------------------  IN: 600 mL / OUT: 2750 mL / NET: -2150 mL    23 @ 07:01  23 @ 15:23  --------------------------------------------------------  IN: 550 mL / OUT: 350 mL / NET: 200 mL        PHYSICAL EXAM:  Constitutional: large body habitus, NAD  HEENT: EOMI, sclera non-icteric, neck supple, trachea midline, no masses  Respiratory: CTA B/L. No crackles/rhonchi. No accessory muscle use.   Cardiovascular: RRR, normal S1S2, no M/R/G  Gastrointestinal: abdomen soft. Suprapubic tenderness to palpation. LLQ ostomy w/ stool output. Suprapubic cath w/o erythema/fluctuance at site. Suprapubic cath outputting urine  Extremities: Warm, well perfused, pulses equal bilateral upper and lower extremities, no edema  Neurological: AAOx3, CN Grossly intact. LLE 2/5 strength in knee flexion. Able to move L toes however limited to no passive ROM in L ankle.  RLE +3/5.  Skin: Normal temperature, warm, dry      LABS:                        11.4   7.23  )-----------( 402      ( 22 Mar 2023 05:30 )             38.1         135  |  100  |  12  ----------------------------<  271<H>  4.0   |  24  |  0.65    Ca    8.6      22 Mar 2023 05:30  Phos  3.4       Mg     1.7         TPro  6.8  /  Alb  2.9<L>  /  TBili  <0.2  /  DBili  x   /  AST  12  /  ALT  9<L>  /  AlkPhos  134<H>  03-21    PT/INR - ( 22 Mar 2023 05:30 )   PT: 12.1 sec;   INR: 1.02          PTT - ( 22 Mar 2023 05:30 )  PTT:29.5 sec      MICROBIOLOGY:    RADIOLOGY & ADDITIONAL STUDIES:

## 2023-03-22 NOTE — PROGRESS NOTE ADULT - PROBLEM SELECTOR PLAN 3
A1c of 10 on previous admission   Home med: Admelog 35units premeals and Humulin 160u TID   Hyperglycemic oh admission with FS>500, downtrended afer fluids and Insulin s/q   - A1c 12.5   - No acidosis or AG  - Endocrinology consulted; recommending custom ISS  - Lantus 60 U.  - Admelog 36 units pre-meals   - F/u recs.

## 2023-03-22 NOTE — PROGRESS NOTE ADULT - PROBLEM SELECTOR PLAN 1
Type 2 diabetes  - Please give Lantus 60 units tonight. Will decide about AM dose in the morning  - Increase lispro  to units before each meal.  - Change Lispro insulin correctional scale to regular moderate scale starting at FSG 150mg/dL (2,4,6,8,10,12)  - Patient's fingerstick glucose goal is 100-180 mg/dL.    - Patient can follow up at discharge with Dr. Joseph at [facility] by calling [phone] to make an appointment.      Case discussed with Dr. Bowden. Primary team updated. Type 2 diabetes  - Please give Lantus 60 units tonight. Will decide about AM dose in the morning  - Derease lispro  to 30 units before each meal.  - Continue Lispro insulin correctional scale moderate scale starting at FSG 150mg/dL (2,4,6,8,10,12)  - Patient's fingerstick glucose goal is 100-180 mg/dL.    - Patient can follow up at discharge with Dr. Joseph at [facility] by calling [phone] to make an appointment.      Case discussed with Dr. Bowden. Primary team updated. Type 2 diabetes  - Please give Lantus 60 units tonight. Will decide about AM dose in the morning  - Decrease lispro  to 30 units before each meal.  - Continue Lispro insulin correctional scale moderate scale starting at FSG 150mg/dL (2,4,6,8,10,12)  - Patient's fingerstick glucose goal is 100-180 mg/dL.    - Patient can follow up at discharge with Dr. Joseph at [facility] by calling [phone] to make an appointment.      Case discussed with Dr. Bowden. Primary team updated.

## 2023-03-22 NOTE — PROGRESS NOTE ADULT - PROBLEM SELECTOR PLAN 4
Home med: Labetalol 100mg qd and Losartan 50mg qd  - Restarted losartan 50mg qd.  - Continue labetalol 100mg qd.

## 2023-03-22 NOTE — PROGRESS NOTE ADULT - SUBJECTIVE AND OBJECTIVE BOX
SUBJECTIVE: Seen and evaluated in am. Pt endorses some RLQ pain. Pt states that SP tube gets clogged but drains better once she self flushes. Endorses dysuria and urgency.       MEDICATIONS  (STANDING):  acetaminophen     Tablet .. 975 milliGRAM(s) Oral every 8 hours  dextrose 5%. 1000 milliLiter(s) (100 mL/Hr) IV Continuous <Continuous>  dextrose 5%. 1000 milliLiter(s) (50 mL/Hr) IV Continuous <Continuous>  dextrose 50% Injectable 25 Gram(s) IV Push once  dextrose 50% Injectable 12.5 Gram(s) IV Push once  dextrose 50% Injectable 25 Gram(s) IV Push once  gentamicin   IVPB 500 milliGRAM(s) IV Intermittent once  glucagon  Injectable 1 milliGRAM(s) IntraMuscular once  insulin glargine Injectable (LANTUS) 60 Unit(s) SubCutaneous at bedtime  insulin lispro (ADMELOG) corrective regimen sliding scale   SubCutaneous Before meals and at bedtime  insulin lispro Injectable (ADMELOG) 30 Unit(s) SubCutaneous three times a day before meals  ketorolac   Injectable 15 milliGRAM(s) IV Push every 8 hours  labetalol 100 milliGRAM(s) Oral every 24 hours  losartan 50 milliGRAM(s) Oral daily  montelukast 10 milliGRAM(s) Oral every 24 hours  oxybutynin 5 milliGRAM(s) Oral every 8 hours  sodium chloride 0.9%. 1000 milliLiter(s) (60 mL/Hr) IV Continuous <Continuous>    MEDICATIONS  (PRN):  albuterol/ipratropium for Nebulization 3 milliLiter(s) Nebulizer every 6 hours PRN Shortness of Breath and/or Wheezing  benzocaine/menthol Lozenge 1 Lozenge Oral every 8 hours PRN Sore Throat  benzonatate 100 milliGRAM(s) Oral every 8 hours PRN Cough  dextrose Oral Gel 15 Gram(s) Oral once PRN Blood Glucose LESS THAN 70 milliGRAM(s)/deciliter  oxyCODONE    IR 5 milliGRAM(s) Oral every 4 hours PRN Severe Pain (7 - 10)      Vital Signs Last 24 Hrs  T(C): 36.8 (22 Mar 2023 17:16), Max: 37.1 (21 Mar 2023 23:55)  T(F): 98.2 (22 Mar 2023 17:16), Max: 98.7 (21 Mar 2023 23:55)  HR: 87 (22 Mar 2023 17:16) (81 - 91)  BP: 127/72 (22 Mar 2023 17:16) (124/78 - 148/80)  BP(mean): --  RR: 17 (22 Mar 2023 17:16) (16 - 18)  SpO2: 98% (22 Mar 2023 17:16) (97% - 98%)    Parameters below as of 22 Mar 2023 17:16  Patient On (Oxygen Delivery Method): room air    General: awake and alert  Abdomen: diffuse abdominal tenderness. ostomy   :  SPT (8fr catheter) appears to not be in correct position-stitches are 3 inches distal to insertion site, but draining clear yellow urine, prima fit attached to urethra-draining clear yellow urine. No CVAT b/l.     I&O's Summary    21 Mar 2023 07:01  -  22 Mar 2023 07:00  --------------------------------------------------------  IN: 600 mL / OUT: 2750 mL / NET: -2150 mL    22 Mar 2023 07:01  -  22 Mar 2023 19:46  --------------------------------------------------------  IN: 550 mL / OUT: 700 mL / NET: -150 mL        LABS:                        11.4   7.23  )-----------( 402      ( 22 Mar 2023 05:30 )             38.1     03-22    135  |  100  |  12  ----------------------------<  271<H>  4.0   |  24  |  0.65    Ca    8.6      22 Mar 2023 05:30  Phos  3.4     03-22  Mg     1.7     03-22    TPro  6.8  /  Alb  2.9<L>  /  TBili  <0.2  /  DBili  x   /  AST  12  /  ALT  9<L>  /  AlkPhos  134<H>  03-21    PT/INR - ( 22 Mar 2023 05:30 )   PT: 12.1 sec;   INR: 1.02          PTT - ( 22 Mar 2023 05:30 )  PTT:29.5 sec    CAPILLARY BLOOD GLUCOSE      POCT Blood Glucose.: 230 mg/dL (22 Mar 2023 17:51)  POCT Blood Glucose.: 96 mg/dL (22 Mar 2023 12:59)  POCT Blood Glucose.: 109 mg/dL (22 Mar 2023 10:47)  POCT Blood Glucose.: 230 mg/dL (22 Mar 2023 08:41)  POCT Blood Glucose.: 114 mg/dL (21 Mar 2023 22:15)    LIVER FUNCTIONS - ( 21 Mar 2023 05:30 )  Alb: 2.9 g/dL / Pro: 6.8 g/dL / ALK PHOS: 134 U/L / ALT: 9 U/L / AST: 12 U/L / GGT: x             RADIOLOGY & ADDITIONAL STUDIES:

## 2023-03-22 NOTE — CONSULT NOTE ADULT - ASSESSMENT
43F  with PMH of HTN, HLD, Abdominal infection c/b Necrotizing fascitis (s/p suprapubic catheter and ostomy bag placement on 11/11/21 at [facility]) , who presents to the ED with concerns of fatigue and episodes of night sweats for last few weeks. necrotizing fascitis s/p abd inf required multiple skin graft procedures, patient c/o tenderness to palpation in the area, chronic in nature. Patient also notes chronic holley exchange, usually q6 wks, notes no exchange performed >2months. Noticed increased amounts of sediment via suprapubic catheter. WBC mildly elevated on admission, now downtrending s/p cefepime, flagyl, zosyn. Cr stable. Culture data revealed fluid collected via suprapubic catheter with Klebsiella pneumoniae (carbapenem resistant) and enterococcus faecalis. BCx without growth x 3d. Would not offer PO ABx option at this time.   - stop zosyn  - start gentamicin 500mg IV q24 hours x 3doses, please obtain gent level 6-14 hours after first dose and discuss with pharmacy if dose adjustment is necessary  - please complete suprapubic catheter exchange   43F  with PMH of HTN, HLD, Abdominal infection c/b Necrotizing fascitis (s/p suprapubic catheter and ostomy bag placement on 11/11/21 at [facility]) , who presents to the ED with concerns of fatigue and episodes of night sweats for last few weeks. necrotizing fascitis s/p abd inf required multiple skin graft procedures, patient c/o tenderness to palpation in the area, chronic in nature. Patient also notes chronic holley exchange, usually q6 wks, notes no exchange performed >2months. Noticed increased amounts of sediment via suprapubic catheter. WBC mildly elevated on admission, now downtrending s/p cefepime, flagyl, zosyn. Cr stable. Culture data revealed fluid collected via suprapubic catheter with Klebsiella pneumoniae (carbapenem resistant) and enterococcus faecalis. BCx without growth x 3d. Would not offer PO ABx option at this time.   - stop zosyn  - start gentamicin 500mg IV q24 hours x 3doses, please obtain gent level 6-14 hours after first dose and discuss with pharmacy if dose adjustment is necessary--last day of therapy = 3/24; no PO options and no to PICC  - please complete suprapubic catheter exchange    Please reconsult with ?

## 2023-03-22 NOTE — PROGRESS NOTE ADULT - SUBJECTIVE AND OBJECTIVE BOX
OVERNIGHT: No acute events overnight.   SUBJECTIVE: Patient was seen and examined this morning. Continues to have cough. She has many diet limitation based on preference, but is eating well. Blood glucose improving. Pending suprapubic catheter exchange.    Diet:  Dinner -   Breakfast -     CAPILLARY BLOOD GLUCOSE & INSULIN RECEIVED  109 mg/dL (03-22 @ 10:47) - Lispro 36  230 mg/dL (03-22 @ 08:41) - Lantus 15 + Lispro 36+4  114 mg/dL (03-21 @ 22:15) - Lantus 60  185 mg/dL (03-21 @ 17:56) - Lispro 30+3  177 mg/dL (03-21 @ 12:55) - Lispro 30+3    REVIEW OF SYSTEMS  Constitutional:  (+) Decreased appetite. Negative fever, chills.  Cardiovascular:  Negative for chest pain or palpitations.  Respiratory:  Negative for cough, wheezing, or shortness of breath.    Gastrointestinal:  (+) Abdominal pain. Negative for nausea, vomiting, diarrhea, constipation.  Neurologic:  No headache, confusion, dizziness, lightheadedness.    PHYSICAL EXAM  Vital Signs Last 24 Hrs  T(C): 37 (22 Mar 2023 08:00), Max: 37.1 (21 Mar 2023 23:55)  T(F): 98.6 (22 Mar 2023 08:00), Max: 98.7 (21 Mar 2023 23:55)  HR: 85 (22 Mar 2023 08:00) (81 - 98)  BP: 148/80 (22 Mar 2023 08:00) (107/66 - 148/80)  BP(mean): --  RR: 16 (22 Mar 2023 08:00) (16 - 18)  SpO2: 98% (22 Mar 2023 08:00) (96% - 98%)    Parameters below as of 22 Mar 2023 08:00  Patient On (Oxygen Delivery Method): room air    Constitutional: Awake, alert, female, in no acute distress. OBese.  HEENT: Normocephalic, atraumatic, NAOMI.  Respiratory: Lungs clear to ausculation bilaterally.   Cardiovascular: regular rhythm, normal S1 and S2, no audible murmurs.   GI: soft, mildly tender, non-distended, (+) Ostomy. (+) Suprapubic catheter.   Extremities: No lower extremity edema.  Psychiatric: AAO x 3. Normal affect/mood. .     LABS  CBC - WBC/HGB/HTC/PLT: 7.23/11.4/38.1/402 (03-22-23)  BMP - Na/K/Cl/Bicarb/BUN/Cr/Gluc: 135/4.0/100/24/12/0.65/271 (03-22-23)  Anion Gap: 11 (03-22-23)  eGFR: 112 (03-22-23)  Calcium: 8.6 (03-22-23)  Phosphorus: 3.4 (03-22-23)  Magnesium: 1.7 (03-22-23)  LFT - Alb/Tprot/Tbili/Dbili/AlkPhos/ALT/AST: 2.9/--/<0.2/--/134/9/12 (03-21-23)  PT/aPTT/INR: 12.1/29.5/1.02 (03-22-23)         MEDICATIONS  MEDICATIONS  (STANDING):  acetaminophen     Tablet .. 975 milliGRAM(s) Oral every 8 hours  dextrose 5%. 1000 milliLiter(s) (100 mL/Hr) IV Continuous <Continuous>  dextrose 5%. 1000 milliLiter(s) (50 mL/Hr) IV Continuous <Continuous>  dextrose 50% Injectable 25 Gram(s) IV Push once  dextrose 50% Injectable 12.5 Gram(s) IV Push once  dextrose 50% Injectable 25 Gram(s) IV Push once  glucagon  Injectable 1 milliGRAM(s) IntraMuscular once  insulin glargine Injectable (LANTUS) 60 Unit(s) SubCutaneous at bedtime  insulin lispro (ADMELOG) corrective regimen sliding scale   SubCutaneous Before meals and at bedtime  insulin lispro Injectable (ADMELOG) 36 Unit(s) SubCutaneous three times a day before meals  ketorolac   Injectable 15 milliGRAM(s) IV Push every 8 hours  labetalol 100 milliGRAM(s) Oral every 24 hours  losartan 50 milliGRAM(s) Oral daily  magnesium sulfate  IVPB 2 Gram(s) IV Intermittent once  montelukast 10 milliGRAM(s) Oral every 24 hours  oxybutynin 5 milliGRAM(s) Oral every 8 hours  piperacillin/tazobactam IVPB.. 4.5 Gram(s) IV Intermittent every 8 hours  sodium chloride 0.9%. 1000 milliLiter(s) (60 mL/Hr) IV Continuous <Continuous>    MEDICATIONS  (PRN):  albuterol/ipratropium for Nebulization 3 milliLiter(s) Nebulizer every 6 hours PRN Shortness of Breath and/or Wheezing  benzocaine/menthol Lozenge 1 Lozenge Oral every 8 hours PRN Sore Throat  benzonatate 100 milliGRAM(s) Oral every 8 hours PRN Cough  dextrose Oral Gel 15 Gram(s) Oral once PRN Blood Glucose LESS THAN 70 milliGRAM(s)/deciliter  HYDROmorphone  Injectable 1 milliGRAM(s) IV Push every 4 hours PRN Severe Pain (7 - 10)   OVERNIGHT: No acute events overnight.   SUBJECTIVE: Patient was seen and examined this morning. Continues to have cough. She has intermittent nausea which impacts for food intake. She has many diet limitation based on preference. Blood glucose improving. She has no sign of lipohypertrophy though her rotation of injection site is limited due to ostomy, thigh skin graft, and pain on injection to outer abdomen. It seems she drinks juice out of fear of hypoglycemia after injecting insulin, especially when she has por PO intake. She does not adjust insulin to compensate for varying food intake. Pending suprapubic catheter exchange.    Diet:  Dinner - yogurt parfait and milk  Breakfast - yogurt parfait and milk.    CAPILLARY BLOOD GLUCOSE & INSULIN RECEIVED  99 mg/dL (3-22 @ 1PM) - sx of hypoglycemia   109 mg/dL (03-22 @ 10:47) - Lispro 36  230 mg/dL (03-22 @ 08:41) - Lantus 15 + Lispro 36+4  114 mg/dL (03-21 @ 22:15) - Lantus 60  185 mg/dL (03-21 @ 17:56) - Lispro 30+3  177 mg/dL (03-21 @ 12:55) - Lispro 30+3    REVIEW OF SYSTEMS  Constitutional:  (+) Decreased appetite. Negative fever, chills.  Cardiovascular:  Negative for chest pain or palpitations.  Respiratory:  Negative for cough, wheezing, or shortness of breath.    Gastrointestinal:  (+) Abdominal pain and nausea, Negative for vomiting, diarrhea, constipation.  Neurologic:  No headache, confusion, dizziness, lightheadedness.    PHYSICAL EXAM  Vital Signs Last 24 Hrs  T(C): 37 (22 Mar 2023 08:00), Max: 37.1 (21 Mar 2023 23:55)  T(F): 98.6 (22 Mar 2023 08:00), Max: 98.7 (21 Mar 2023 23:55)  HR: 85 (22 Mar 2023 08:00) (81 - 98)  BP: 148/80 (22 Mar 2023 08:00) (107/66 - 148/80)  BP(mean): --  RR: 16 (22 Mar 2023 08:00) (16 - 18)  SpO2: 98% (22 Mar 2023 08:00) (96% - 98%)    Parameters below as of 22 Mar 2023 08:00  Patient On (Oxygen Delivery Method): room air    Constitutional: Awake, alert, female, in no acute distress. OBese.  HEENT: Normocephalic, atraumatic, NAOMI.  Respiratory: Lungs clear to ausculation bilaterally.   Cardiovascular: regular rhythm, normal S1 and S2, no audible murmurs.   GI: soft, mildly tender, non-distended, (+) Ostomy. (+) Suprapubic catheter.   Extremities: No lower extremity edema.  Psychiatric: AAO x 3. Normal affect/mood. .     LABS  CBC - WBC/HGB/HTC/PLT: 7.23/11.4/38.1/402 (03-22-23)  BMP - Na/K/Cl/Bicarb/BUN/Cr/Gluc: 135/4.0/100/24/12/0.65/271 (03-22-23)  Anion Gap: 11 (03-22-23)  eGFR: 112 (03-22-23)  Calcium: 8.6 (03-22-23)  Phosphorus: 3.4 (03-22-23)  Magnesium: 1.7 (03-22-23)  LFT - Alb/Tprot/Tbili/Dbili/AlkPhos/ALT/AST: 2.9/--/<0.2/--/134/9/12 (03-21-23)  PT/aPTT/INR: 12.1/29.5/1.02 (03-22-23)         MEDICATIONS  MEDICATIONS  (STANDING):  acetaminophen     Tablet .. 975 milliGRAM(s) Oral every 8 hours  dextrose 5%. 1000 milliLiter(s) (100 mL/Hr) IV Continuous <Continuous>  dextrose 5%. 1000 milliLiter(s) (50 mL/Hr) IV Continuous <Continuous>  dextrose 50% Injectable 25 Gram(s) IV Push once  dextrose 50% Injectable 12.5 Gram(s) IV Push once  dextrose 50% Injectable 25 Gram(s) IV Push once  glucagon  Injectable 1 milliGRAM(s) IntraMuscular once  insulin glargine Injectable (LANTUS) 60 Unit(s) SubCutaneous at bedtime  insulin lispro (ADMELOG) corrective regimen sliding scale   SubCutaneous Before meals and at bedtime  insulin lispro Injectable (ADMELOG) 36 Unit(s) SubCutaneous three times a day before meals  ketorolac   Injectable 15 milliGRAM(s) IV Push every 8 hours  labetalol 100 milliGRAM(s) Oral every 24 hours  losartan 50 milliGRAM(s) Oral daily  magnesium sulfate  IVPB 2 Gram(s) IV Intermittent once  montelukast 10 milliGRAM(s) Oral every 24 hours  oxybutynin 5 milliGRAM(s) Oral every 8 hours  piperacillin/tazobactam IVPB.. 4.5 Gram(s) IV Intermittent every 8 hours  sodium chloride 0.9%. 1000 milliLiter(s) (60 mL/Hr) IV Continuous <Continuous>    MEDICATIONS  (PRN):  albuterol/ipratropium for Nebulization 3 milliLiter(s) Nebulizer every 6 hours PRN Shortness of Breath and/or Wheezing  benzocaine/menthol Lozenge 1 Lozenge Oral every 8 hours PRN Sore Throat  benzonatate 100 milliGRAM(s) Oral every 8 hours PRN Cough  dextrose Oral Gel 15 Gram(s) Oral once PRN Blood Glucose LESS THAN 70 milliGRAM(s)/deciliter  HYDROmorphone  Injectable 1 milliGRAM(s) IV Push every 4 hours PRN Severe Pain (7 - 10)

## 2023-03-22 NOTE — PROGRESS NOTE ADULT - PROBLEM SELECTOR PLAN 2
Placed on 11/11/21 at [facility] due to abdominal necrotizing fascitis   Plan:  - Ostomy care and wound care education   - Wound care consulted, f/u recs.  - Patient is interested in having care and here at [facility] and establishing follow up with surgery here for reversal as she told she was informed about the reversal from her surgery team at other hospital but was lost to follow up

## 2023-03-22 NOTE — PROGRESS NOTE ADULT - PROBLEM SELECTOR PLAN 1
2/2 complicated urinary tract infection due to suprapubic catheter   SIRS Positive for tachycardia and WBC +lactic acidosis >5 with suspected source of infection   Plan:  - c/w zosyn due to multiple hospitalization and concern for broader coverage( Patient denied any anaphylactic reaction from penicillin)   - urine cx growing klebsiella and enterococcus faecalis, Enterococcus susceptible to ampicillin, pending Klebsiella sensitivies  - IR consult for suprapubic cath exchange; normal CT abdomen and pelvis, reported no role for exchange, urology made aware, attending to attending convo surrounding appropriate management to ensue, f/u recs.

## 2023-03-22 NOTE — PROGRESS NOTE ADULT - SUBJECTIVE AND OBJECTIVE BOX
INTERVAL HPI/OVERNIGHT EVENTS:  O/N: Patient afebrile with stable vitals overnight. COVID -. Endocrine changed regimen to lantus 60U, and lispro 36U. CT A and P done, suprapubic catheter in place. Consistent w cystitis.    This morning: Patient was seen and examined at bedside. Pain better controlled. Suprapubic catheter exchange not warranted as per IR, urology and IR will touch base. Enterococcus susceptible to ampicillin. Klebsiella susceptibilities pending. Otherwise patient is feeling fairly well, pain is still present but better controlled. Denies fever, chills, c/p, palpitations, SOB, cough, diarrhea, constipation, dysuria, hematuria, new LE swelling.    Vital Signs Last 24 Hrs  T(C): 37 (22 Mar 2023 08:00), Max: 37.1 (21 Mar 2023 23:55)  T(F): 98.6 (22 Mar 2023 08:00), Max: 98.7 (21 Mar 2023 23:55)  HR: 85 (22 Mar 2023 08:00) (81 - 98)  BP: 148/80 (22 Mar 2023 08:00) (107/66 - 148/80)  BP(mean): --  RR: 16 (22 Mar 2023 08:00) (16 - 18)  SpO2: 98% (22 Mar 2023 08:00) (96% - 98%)    Parameters below as of 22 Mar 2023 08:00  Patient On (Oxygen Delivery Method): room air        PHYSICAL EXAM:  Constitutional: large body habitus, NAD  HEENT: EOMI, sclera non-icteric, neck supple, trachea midline, no masses  Respiratory: CTA B/L. No crackles/rhonchi. No accessory muscle use.   Cardiovascular: RRR, normal S1S2, no M/R/G  Gastrointestinal: abdomen soft. Suprapubic tenderness to palpation. LLQ ostomy w/ stool output. Suprapubic cath w/o erythema/fluctuance at site. Suprapubic cath outputting urine  Extremities: Warm, well perfused, pulses equal bilateral upper and lower extremities, no edema  Neurological: AAOx3, CN Grossly intact. LLE 2/5 strength in knee flexion. Able to move L toes however limited to no passive ROM in L ankle.  RLE +3/5.  Skin: Normal temperature, warm, dry    MEDICATIONS  (STANDING):  acetaminophen     Tablet .. 975 milliGRAM(s) Oral every 8 hours  budesonide 160 MICROgram(s)/formoterol 4.5 MICROgram(s) Inhaler 2 Puff(s) Inhalation two times a day  dextrose 5%. 1000 milliLiter(s) (100 mL/Hr) IV Continuous <Continuous>  dextrose 5%. 1000 milliLiter(s) (50 mL/Hr) IV Continuous <Continuous>  dextrose 50% Injectable 25 Gram(s) IV Push once  dextrose 50% Injectable 12.5 Gram(s) IV Push once  dextrose 50% Injectable 25 Gram(s) IV Push once  glucagon  Injectable 1 milliGRAM(s) IntraMuscular once  insulin glargine Injectable (LANTUS) 20 Unit(s) SubCutaneous at bedtime  insulin lispro (ADMELOG) corrective regimen sliding scale   SubCutaneous Before meals and at bedtime  insulin lispro Injectable (ADMELOG) 30 Unit(s) SubCutaneous three times a day before meals  ketorolac   Injectable 15 milliGRAM(s) IV Push every 8 hours  losartan 50 milliGRAM(s) Oral daily  oxybutynin 5 milliGRAM(s) Oral every 8 hours  piperacillin/tazobactam IVPB.. 4.5 Gram(s) IV Intermittent every 8 hours  sodium chloride 0.9%. 1000 milliLiter(s) (60 mL/Hr) IV Continuous <Continuous>    MEDICATIONS  (PRN):  albuterol/ipratropium for Nebulization 3 milliLiter(s) Nebulizer every 6 hours PRN Shortness of Breath and/or Wheezing  benzocaine/menthol Lozenge 1 Lozenge Oral every 8 hours PRN Sore Throat  benzonatate 100 milliGRAM(s) Oral every 8 hours PRN Cough  dextrose Oral Gel 15 Gram(s) Oral once PRN Blood Glucose LESS THAN 70 milliGRAM(s)/deciliter  HYDROmorphone  Injectable 1 milliGRAM(s) IV Push every 4 hours PRN Severe Pain (7 - 10)      Allergies    amoxicillin (Unknown)  clindamycin (Pruritus)  fish (Hives; Urticaria)  iodine containing compounds (Hives; Anaphylaxis)  penicillin (Hives)  shellfish. (Hives; Anaphylaxis)    Intolerances    Bactrim I.V. (Rash (Mod to Severe))    LABS:                         11.4   7.23  )-----------( 402      ( 22 Mar 2023 05:30 )             38.1     03-22    135  |  100  |  12  ----------------------------<  271<H>  4.0   |  24  |  0.65    Ca    8.6      22 Mar 2023 05:30  Phos  3.4     03-22  Mg     1.7     03-22    TPro  6.8  /  Alb  2.9<L>  /  TBili  <0.2  /  DBili  x   /  AST  12  /  ALT  9<L>  /  AlkPhos  134<H>  03-21    PT/INR - ( 22 Mar 2023 05:30 )   PT: 12.1 sec;   INR: 1.02          PTT - ( 22 Mar 2023 05:30 )  PTT:29.5 sec              RADIOLOGY, EKG & ADDITIONAL TESTS:   PT/INR - ( 21 Mar 2023 05:30 )   PT: 13.3 sec;   INR: 1.12          PTT - ( 21 Mar 2023 05:30 )  PTT:29.1 sec            RADIOLOGY & ADDITIONAL TESTS:  Reviewed

## 2023-03-23 DIAGNOSIS — R10.9 UNSPECIFIED ABDOMINAL PAIN: ICD-10-CM

## 2023-03-23 DIAGNOSIS — N39.0 URINARY TRACT INFECTION, SITE NOT SPECIFIED: ICD-10-CM

## 2023-03-23 LAB
ALBUMIN SERPL ELPH-MCNC: 3 G/DL — LOW (ref 3.3–5)
ALP SERPL-CCNC: 109 U/L — SIGNIFICANT CHANGE UP (ref 40–120)
ALT FLD-CCNC: 9 U/L — LOW (ref 10–45)
ANION GAP SERPL CALC-SCNC: 9 MMOL/L — SIGNIFICANT CHANGE UP (ref 5–17)
AST SERPL-CCNC: 14 U/L — SIGNIFICANT CHANGE UP (ref 10–40)
BASOPHILS # BLD AUTO: 0.02 K/UL — SIGNIFICANT CHANGE UP (ref 0–0.2)
BASOPHILS NFR BLD AUTO: 0.3 % — SIGNIFICANT CHANGE UP (ref 0–2)
BILIRUB SERPL-MCNC: <0.2 MG/DL — SIGNIFICANT CHANGE UP (ref 0.2–1.2)
BLD GP AB SCN SERPL QL: POSITIVE — SIGNIFICANT CHANGE UP
BUN SERPL-MCNC: 17 MG/DL — SIGNIFICANT CHANGE UP (ref 7–23)
CALCIUM SERPL-MCNC: 8.2 MG/DL — LOW (ref 8.4–10.5)
CHLORIDE SERPL-SCNC: 103 MMOL/L — SIGNIFICANT CHANGE UP (ref 96–108)
CO2 SERPL-SCNC: 23 MMOL/L — SIGNIFICANT CHANGE UP (ref 22–31)
CREAT SERPL-MCNC: 0.74 MG/DL — SIGNIFICANT CHANGE UP (ref 0.5–1.3)
EGFR: 103 ML/MIN/1.73M2 — SIGNIFICANT CHANGE UP
EOSINOPHIL # BLD AUTO: 0.17 K/UL — SIGNIFICANT CHANGE UP (ref 0–0.5)
EOSINOPHIL NFR BLD AUTO: 2.6 % — SIGNIFICANT CHANGE UP (ref 0–6)
GENTAMICIN SERPL-MCNC: 5 UG/ML
GLUCOSE BLDC GLUCOMTR-MCNC: 165 MG/DL — HIGH (ref 70–99)
GLUCOSE BLDC GLUCOMTR-MCNC: 182 MG/DL — HIGH (ref 70–99)
GLUCOSE BLDC GLUCOMTR-MCNC: 228 MG/DL — HIGH (ref 70–99)
GLUCOSE BLDC GLUCOMTR-MCNC: 528 MG/DL — CRITICAL HIGH (ref 70–99)
GLUCOSE BLDC GLUCOMTR-MCNC: 551 MG/DL — CRITICAL HIGH (ref 70–99)
GLUCOSE SERPL-MCNC: 228 MG/DL — HIGH (ref 70–99)
HCT VFR BLD CALC: 36.6 % — SIGNIFICANT CHANGE UP (ref 34.5–45)
HGB BLD-MCNC: 10.9 G/DL — LOW (ref 11.5–15.5)
IMM GRANULOCYTES NFR BLD AUTO: 1.1 % — HIGH (ref 0–0.9)
LYMPHOCYTES # BLD AUTO: 2.19 K/UL — SIGNIFICANT CHANGE UP (ref 1–3.3)
LYMPHOCYTES # BLD AUTO: 32.9 % — SIGNIFICANT CHANGE UP (ref 13–44)
MAGNESIUM SERPL-MCNC: 2 MG/DL — SIGNIFICANT CHANGE UP (ref 1.6–2.6)
MCHC RBC-ENTMCNC: 24.3 PG — LOW (ref 27–34)
MCHC RBC-ENTMCNC: 29.8 GM/DL — LOW (ref 32–36)
MCV RBC AUTO: 81.7 FL — SIGNIFICANT CHANGE UP (ref 80–100)
MONOCYTES # BLD AUTO: 0.54 K/UL — SIGNIFICANT CHANGE UP (ref 0–0.9)
MONOCYTES NFR BLD AUTO: 8.1 % — SIGNIFICANT CHANGE UP (ref 2–14)
NEUTROPHILS # BLD AUTO: 3.66 K/UL — SIGNIFICANT CHANGE UP (ref 1.8–7.4)
NEUTROPHILS NFR BLD AUTO: 55 % — SIGNIFICANT CHANGE UP (ref 43–77)
NRBC # BLD: 0 /100 WBCS — SIGNIFICANT CHANGE UP (ref 0–0)
PHOSPHATE SERPL-MCNC: 3.8 MG/DL — SIGNIFICANT CHANGE UP (ref 2.5–4.5)
PLATELET # BLD AUTO: 345 K/UL — SIGNIFICANT CHANGE UP (ref 150–400)
POTASSIUM SERPL-MCNC: 4.7 MMOL/L — SIGNIFICANT CHANGE UP (ref 3.5–5.3)
POTASSIUM SERPL-SCNC: 4.7 MMOL/L — SIGNIFICANT CHANGE UP (ref 3.5–5.3)
PROT SERPL-MCNC: 6.4 G/DL — SIGNIFICANT CHANGE UP (ref 6–8.3)
RBC # BLD: 4.48 M/UL — SIGNIFICANT CHANGE UP (ref 3.8–5.2)
RBC # FLD: 15.2 % — HIGH (ref 10.3–14.5)
RH IG SCN BLD-IMP: NEGATIVE — SIGNIFICANT CHANGE UP
SODIUM SERPL-SCNC: 135 MMOL/L — SIGNIFICANT CHANGE UP (ref 135–145)
WBC # BLD: 6.65 K/UL — SIGNIFICANT CHANGE UP (ref 3.8–10.5)
WBC # FLD AUTO: 6.65 K/UL — SIGNIFICANT CHANGE UP (ref 3.8–10.5)

## 2023-03-23 PROCEDURE — 51705 CHANGE OF BLADDER TUBE: CPT

## 2023-03-23 PROCEDURE — 75984 XRAY CONTROL CATHETER CHANGE: CPT | Mod: 26

## 2023-03-23 PROCEDURE — 99233 SBSQ HOSP IP/OBS HIGH 50: CPT | Mod: GC

## 2023-03-23 PROCEDURE — 99152 MOD SED SAME PHYS/QHP 5/>YRS: CPT

## 2023-03-23 RX ORDER — INSULIN GLARGINE 100 [IU]/ML
10 INJECTION, SOLUTION SUBCUTANEOUS ONCE
Refills: 0 | Status: COMPLETED | OUTPATIENT
Start: 2023-03-23 | End: 2023-03-23

## 2023-03-23 RX ORDER — INSULIN GLARGINE 100 [IU]/ML
10 INJECTION, SOLUTION SUBCUTANEOUS EVERY MORNING
Refills: 0 | Status: DISCONTINUED | OUTPATIENT
Start: 2023-03-24 | End: 2023-03-25

## 2023-03-23 RX ORDER — HYDROMORPHONE HYDROCHLORIDE 2 MG/ML
0.5 INJECTION INTRAMUSCULAR; INTRAVENOUS; SUBCUTANEOUS EVERY 4 HOURS
Refills: 0 | Status: DISCONTINUED | OUTPATIENT
Start: 2023-03-23 | End: 2023-03-24

## 2023-03-23 RX ORDER — GENTAMICIN SULFATE 40 MG/ML
500 VIAL (ML) INJECTION ONCE
Refills: 0 | Status: COMPLETED | OUTPATIENT
Start: 2023-03-24 | End: 2023-03-24

## 2023-03-23 RX ORDER — HYDROMORPHONE HYDROCHLORIDE 2 MG/ML
1 INJECTION INTRAMUSCULAR; INTRAVENOUS; SUBCUTANEOUS ONCE
Refills: 0 | Status: DISCONTINUED | OUTPATIENT
Start: 2023-03-23 | End: 2023-03-23

## 2023-03-23 RX ORDER — HYDROMORPHONE HYDROCHLORIDE 2 MG/ML
0.5 INJECTION INTRAMUSCULAR; INTRAVENOUS; SUBCUTANEOUS ONCE
Refills: 0 | Status: DISCONTINUED | OUTPATIENT
Start: 2023-03-23 | End: 2023-03-23

## 2023-03-23 RX ORDER — LOSARTAN POTASSIUM 100 MG/1
1 TABLET, FILM COATED ORAL
Qty: 0 | Refills: 0 | DISCHARGE
Start: 2023-03-23

## 2023-03-23 RX ADMIN — OXYCODONE HYDROCHLORIDE 5 MILLIGRAM(S): 5 TABLET ORAL at 03:18

## 2023-03-23 RX ADMIN — Medication 15 MILLIGRAM(S): at 00:29

## 2023-03-23 RX ADMIN — HYDROMORPHONE HYDROCHLORIDE 1 MILLIGRAM(S): 2 INJECTION INTRAMUSCULAR; INTRAVENOUS; SUBCUTANEOUS at 05:14

## 2023-03-23 RX ADMIN — Medication 4: at 17:40

## 2023-03-23 RX ADMIN — HYDROMORPHONE HYDROCHLORIDE 0.5 MILLIGRAM(S): 2 INJECTION INTRAMUSCULAR; INTRAVENOUS; SUBCUTANEOUS at 10:26

## 2023-03-23 RX ADMIN — HYDROMORPHONE HYDROCHLORIDE 0.5 MILLIGRAM(S): 2 INJECTION INTRAMUSCULAR; INTRAVENOUS; SUBCUTANEOUS at 22:27

## 2023-03-23 RX ADMIN — Medication 5 MILLIGRAM(S): at 07:52

## 2023-03-23 RX ADMIN — HYDROMORPHONE HYDROCHLORIDE 1 MILLIGRAM(S): 2 INJECTION INTRAMUSCULAR; INTRAVENOUS; SUBCUTANEOUS at 05:29

## 2023-03-23 RX ADMIN — Medication 15 MILLIGRAM(S): at 07:52

## 2023-03-23 RX ADMIN — OXYCODONE HYDROCHLORIDE 5 MILLIGRAM(S): 5 TABLET ORAL at 18:20

## 2023-03-23 RX ADMIN — HYDROMORPHONE HYDROCHLORIDE 0.5 MILLIGRAM(S): 2 INJECTION INTRAMUSCULAR; INTRAVENOUS; SUBCUTANEOUS at 18:27

## 2023-03-23 RX ADMIN — OXYCODONE HYDROCHLORIDE 5 MILLIGRAM(S): 5 TABLET ORAL at 07:52

## 2023-03-23 RX ADMIN — HYDROMORPHONE HYDROCHLORIDE 0.5 MILLIGRAM(S): 2 INJECTION INTRAMUSCULAR; INTRAVENOUS; SUBCUTANEOUS at 10:45

## 2023-03-23 RX ADMIN — OXYCODONE HYDROCHLORIDE 5 MILLIGRAM(S): 5 TABLET ORAL at 08:52

## 2023-03-23 RX ADMIN — LOSARTAN POTASSIUM 50 MILLIGRAM(S): 100 TABLET, FILM COATED ORAL at 06:46

## 2023-03-23 RX ADMIN — OXYCODONE HYDROCHLORIDE 5 MILLIGRAM(S): 5 TABLET ORAL at 17:40

## 2023-03-23 RX ADMIN — HYDROMORPHONE HYDROCHLORIDE 0.5 MILLIGRAM(S): 2 INJECTION INTRAMUSCULAR; INTRAVENOUS; SUBCUTANEOUS at 18:47

## 2023-03-23 RX ADMIN — Medication 5 MILLIGRAM(S): at 22:23

## 2023-03-23 RX ADMIN — HYDROMORPHONE HYDROCHLORIDE 0.5 MILLIGRAM(S): 2 INJECTION INTRAMUSCULAR; INTRAVENOUS; SUBCUTANEOUS at 22:42

## 2023-03-23 RX ADMIN — Medication 2: at 12:06

## 2023-03-23 RX ADMIN — Medication 15 MILLIGRAM(S): at 17:40

## 2023-03-23 RX ADMIN — Medication 12: at 23:46

## 2023-03-23 RX ADMIN — Medication 2: at 06:46

## 2023-03-23 RX ADMIN — INSULIN GLARGINE 60 UNIT(S): 100 INJECTION, SOLUTION SUBCUTANEOUS at 23:46

## 2023-03-23 RX ADMIN — OXYCODONE HYDROCHLORIDE 5 MILLIGRAM(S): 5 TABLET ORAL at 03:40

## 2023-03-23 NOTE — PROGRESS NOTE ADULT - PROBLEM SELECTOR PLAN 1
2/2 complicated urinary tract infection due to suprapubic catheter   SIRS Positive for tachycardia and WBC +lactic acidosis >5 with suspected source of infection   Plan:  - c/w gentamycin, s/p 1 dose 3/22 PM, f/u gentamycin level this AM.  - urine cx growing klebsiella and enterococcus faecalis, Enterococcus susceptible to ampicillin, Klebsiella MDR->S to gentamycin  - IR consult for suprapubic cath exchange; normal CT abdomen and pelvis, plan for removal. 2/2 complicated urinary tract infection due to suprapubic catheter   SIRS Positive for tachycardia and WBC +lactic acidosis >5 with suspected source of infection   Plan:  - c/w gentamycin, s/p 1 dose 3/22 PM, f/u gentamycin level this AM. needs three total doses.  - urine cx growing klebsiella and enterococcus faecalis, Enterococcus susceptible to ampicillin, Klebsiella MDR->S to gentamycin  - IR consult for suprapubic cath exchange; normal CT abdomen and pelvis, plan for removal. 2/2 complicated urinary tract infection due to suprapubic catheter   SIRS Positive for tachycardia and WBC +lactic acidosis >5 with suspected source of infection   Plan:  - c/w gentamycin, s/p 1 dose 3/22 PM, f/u gentamycin level this AM-> 5.0, dosing q36h confirmed w clinical pharmacist-> second dose 3/24/22 11AM  - urine cx growing klebsiella and enterococcus faecalis, Enterococcus susceptible to ampicillin, Klebsiella MDR->S to gentamycin  - IR consult for suprapubic cath exchange; normal CT abdomen and pelvis, plan for removal.

## 2023-03-23 NOTE — ADVANCED PRACTICE NURSE CONSULT - ASSESSMENT
Established ostomy pouching system intact. Patient stated she is independent in ostomy care. Suprapubic tube insertion site dry. WOCN offered patient to apply split gauze to site. The patient stated she did not want it at this time. WOCN left dressings at the bedside.

## 2023-03-23 NOTE — PROGRESS NOTE ADULT - SUBJECTIVE AND OBJECTIVE BOX
*************** INCOMPLETE *****************    INFECTIOUS DISEASES CONSULT FOLLOW-UP NOTE    INTERVAL HPI/OVERNIGHT EVENTS:      ROS:   Constitutional, eyes, ENT, cardiovascular, respiratory, gastrointestinal, genitourinary, integumentary, neurological, psychiatric and heme/lymph are otherwise negative other than noted above       ANTIBIOTICS/RELEVANT:    MEDICATIONS  (STANDING):  acetaminophen     Tablet .. 975 milliGRAM(s) Oral every 8 hours  dextrose 5%. 1000 milliLiter(s) (100 mL/Hr) IV Continuous <Continuous>  dextrose 5%. 1000 milliLiter(s) (50 mL/Hr) IV Continuous <Continuous>  dextrose 50% Injectable 25 Gram(s) IV Push once  dextrose 50% Injectable 12.5 Gram(s) IV Push once  dextrose 50% Injectable 25 Gram(s) IV Push once  glucagon  Injectable 1 milliGRAM(s) IntraMuscular once  insulin glargine Injectable (LANTUS) 60 Unit(s) SubCutaneous at bedtime  insulin lispro (ADMELOG) corrective regimen sliding scale   SubCutaneous Before meals and at bedtime  insulin lispro Injectable (ADMELOG) 30 Unit(s) SubCutaneous three times a day before meals  ketorolac   Injectable 15 milliGRAM(s) IV Push every 8 hours  labetalol 100 milliGRAM(s) Oral every 24 hours  losartan 50 milliGRAM(s) Oral daily  montelukast 10 milliGRAM(s) Oral every 24 hours  oxybutynin 5 milliGRAM(s) Oral every 8 hours  sodium chloride 0.9%. 1000 milliLiter(s) (60 mL/Hr) IV Continuous <Continuous>    MEDICATIONS  (PRN):  albuterol/ipratropium for Nebulization 3 milliLiter(s) Nebulizer every 6 hours PRN Shortness of Breath and/or Wheezing  benzocaine/menthol Lozenge 1 Lozenge Oral every 8 hours PRN Sore Throat  benzonatate 100 milliGRAM(s) Oral every 8 hours PRN Cough  dextrose Oral Gel 15 Gram(s) Oral once PRN Blood Glucose LESS THAN 70 milliGRAM(s)/deciliter  oxyCODONE    IR 5 milliGRAM(s) Oral every 4 hours PRN Severe Pain (7 - 10)        Vital Signs Last 24 Hrs  T(C): 37.1 (23 Mar 2023 05:18), Max: 37.1 (23 Mar 2023 05:18)  T(F): 98.8 (23 Mar 2023 05:18), Max: 98.8 (23 Mar 2023 05:18)  HR: 88 (23 Mar 2023 05:18) (87 - 88)  BP: 125/78 (23 Mar 2023 05:18) (124/79 - 127/81)  BP(mean): --  RR: 17 (23 Mar 2023 05:18) (16 - 17)  SpO2: 98% (23 Mar 2023 05:18) (96% - 98%)    Parameters below as of 23 Mar 2023 05:18  Patient On (Oxygen Delivery Method): room air        03-22-23 @ 07:01  -  03-23-23 @ 07:00  --------------------------------------------------------  IN: 1630 mL / OUT: 1150 mL / NET: 480 mL      PHYSICAL EXAM:  Constitutional: alert, NAD  Eyes: the sclera and conjunctiva were normal.   ENT: the ears and nose were normal in appearance.   Neck: the appearance of the neck was normal and the neck was supple.   Pulmonary: no respiratory distress and lungs were clear to auscultation bilaterally.   Heart: heart rate was normal and rhythm regular, normal S1 and S2  Vascular:. there was no peripheral edema  Abdomen: normal bowel sounds, soft, non-tender  Neurological: no focal deficits.   Psychiatric: the affect was normal        LABS:                        11.4   7.23  )-----------( 402      ( 22 Mar 2023 05:30 )             38.1     03-22    135  |  100  |  12  ----------------------------<  271<H>  4.0   |  24  |  0.65    Ca    8.6      22 Mar 2023 05:30  Phos  3.4     03-22  Mg     1.7     03-22      PT/INR - ( 22 Mar 2023 05:30 )   PT: 12.1 sec;   INR: 1.02          PTT - ( 22 Mar 2023 05:30 )  PTT:29.5 sec      MICROBIOLOGY:      RADIOLOGY & ADDITIONAL STUDIES:  Reviewed

## 2023-03-23 NOTE — CHART NOTE - NSCHARTNOTEFT_GEN_A_CORE
Ms. Katz is a 43-year-old woman with history of poorly controlled type 2 diabetes mellitus on insulin, abdominal infection complicated by necrotizing fasciitis, s/p suprapubic catheter, ostomy bag placement who was admitted for complicated UTI. Endocrinology is following for management of hyperglycemia.     The patient was evaluated by fellow, but was out for procedure by the time endocrinology team rounds started. Blood glucose levels have improved; however, she continues to have levels >200 mg/dL. Yesterday, she didn't receive her day time dose of insulin as she refused taking it. Her lunch dose of insulin was held with subsequent hyperglycemia by dinner time. She doesn't remember eating dinner; however, blood glucose remained elevated despite receiving a total of 40 units of lispro (which reflects she actually did eat something).     CAPILLARY BLOOD GLUCOSE AND INSULIN RECEIVED  114 mg/dL (03-21 @ 22:15) ? 60 units of lantus.   230 mg/dL (03-22 @ 08:41) ? 36 units of lispro + 4 units of lispro sliding scale. Lantus nor given as patient refused.   109 mg/dL (03-22 @ 10:47) ? Ø (Held per MD Coronado).   96 mg/dL (03-22 @ 12:59) ? Ø   230 mg/dL (03-22 @ 17:51) ? 36 units of lispro + 4 units of lispro sliding scale.   222 mg/dL (03-22 @ 22:01) ? 60 units of lispro + 4 units of lispro sliding scale.   182 mg/dL (03-23 @ 06:27) ? 2 units of lispro sliding scale (NPO).   165 mg/dL (03-23 @ 11:14) ? 2 units of lispro sliding scale (NPO).    # Type 2 diabetes mellitus  - Start lantus 10 units daily in the morning.   - Please continue lantus 60 units at bedtime.   - Continue lispro 30 units before each meal.  - Continue lispro moderate dose sliding scale before meals and at bedtime.  - Patient's fingerstick glucose goal is 100-180 mg/dL.    - Discharge regimen to be discussed.   - Patient can follow up at discharge with [facility] by calling [phone] to make an appointment.      Case discussed with Dr. Bowden. Primary team updated.     Gerald Qiu  Endocrinology Fellow Ms. Katz is a 43-year-old woman with history of poorly controlled type 2 diabetes mellitus on insulin, abdominal infection complicated by necrotizing fasciitis, s/p suprapubic catheter, ostomy bag placement who was admitted for complicated UTI. Endocrinology is following for management of hyperglycemia.     The patient was evaluated by fellow, but was out for procedure by the time endocrinology team rounds started. Blood glucose levels have improved; however, she continues to have levels >200 mg/dL. Yesterday, she didn't receive her day time dose of insulin as she refused taking it. Her lunch dose of insulin was held with subsequent hyperglycemia by dinner time. She doesn't remember eating dinner; however, blood glucose remained elevated despite receiving a total of 40 units of lispro (which reflects she actually did eat something).     CAPILLARY BLOOD GLUCOSE AND INSULIN RECEIVED  114 mg/dL (03-21 @ 22:15) ? 60 units of lantus.   230 mg/dL (03-22 @ 08:41) ? 36 units of lispro + 4 units of lispro sliding scale. Lantus nor given as patient refused.   109 mg/dL (03-22 @ 10:47) ? Ø (Held per MD Coronado).   96 mg/dL (03-22 @ 12:59) ? Ø   230 mg/dL (03-22 @ 17:51) ? 36 units of lispro + 4 units of lispro sliding scale.   222 mg/dL (03-22 @ 22:01) ? 60 units of Lantus + 4 units of lispro sliding scale.   182 mg/dL (03-23 @ 06:27) ? 2 units of lispro sliding scale (NPO).   165 mg/dL (03-23 @ 11:14) ? 2 units of lispro sliding scale (NPO).    # Type 2 diabetes mellitus  - Start lantus 10 units daily in the morning.   - Please continue lantus 60 units at bedtime.   - Continue lispro 30 units before each meal.  - Continue lispro moderate dose sliding scale before meals and at bedtime.  - Patient's fingerstick glucose goal is 100-180 mg/dL.    - Discharge regimen to be discussed.   - Patient can follow up at discharge with [facility] by calling [phone] to make an appointment.      Case discussed with Dr. Bowden. Primary team updated.     Gerald Qiu  Endocrinology Fellow    Attending:  Above discussed with Dr. Cherry.  Pt unable to be seen on rounds since she was off the floor for procedure.  Still needs an AM dose of Lantus, but unfortunately refused it today.  Will order 10 units for tomorrow once again, add to it if her AM fingerstick is clearly dictates a higher dose.    MARIBEL Bowden MD

## 2023-03-23 NOTE — PROGRESS NOTE ADULT - PROBLEM SELECTOR PLAN 2
Placed on 11/11/21 at [facility] due to abdominal necrotizing fascitis   Plan:  - Ostomy care and wound care education   - Wound care consulted, f/u recs.-> will see pt today 3/23/23.  - Patient is interested in having care and here at [facility] and establishing follow up with surgery here for reversal as she told she was informed about the reversal from her surgery team at other hospital but was lost to follow up Complicated urinary tract infection due to suprapubic catheter that was POA  Plan:  - Plan as above

## 2023-03-23 NOTE — PROGRESS NOTE ADULT - PROBLEM SELECTOR PLAN 4
Home med: Labetalol 100mg qd and Losartan 50mg qd  - Restarted losartan 50mg qd.  - Continue labetalol 100mg qd. A1c of 10 on previous admission   Home med: Admelog 35units premeals and Humulin 160u TID   Hyperglycemic oh admission with FS>500, downtrended afer fluids and Insulin s/q   - A1c 12.5   - No acidosis or AG  - Endocrinology consulted; recommending custom ISS  - Lantus 60 U.  - Admelog 30 units pre-meals   - F/u recs.

## 2023-03-23 NOTE — PROGRESS NOTE ADULT - ATTENDING COMMENTS
Pt. seen and examined by me earlier today; I have read Dr. Coronado's note, I agree w/ his findings and plan of care as documented; cont. gent, per ID recs, dose per level; f/u  and IR recs, plan for SPT exchange

## 2023-03-23 NOTE — ADVANCED PRACTICE NURSE CONSULT - REASON FOR CONSULT
WOC nurse consult to assess fistula. Per the H&P, the patient is a 43F  with PMH of HTN, HLD, abdominal infection c/b necrotizing fascitis (s/p suprapubic catheter and ostomy bag placement on 11/11/21 at [facility]) , who presented to the ED with concerns of fatigue and episodes of night sweats for last few weeks.

## 2023-03-23 NOTE — CONSULT NOTE ADULT - SUBJECTIVE AND OBJECTIVE BOX
43F with PMH of HTN, HLD, abdominal infection c/b Necrotizing fascitis (s/p suprapubic catheter and ostomy bag placement on 21 at [facility]), who presents to the ED with multiple complaints. Pt concerned that she has a urinary infection; has noticed cloudy / pink tinged urine output from suprapubic cath - last changed 4 weeks ago. feels there is some redness and pain at the cath insertion site. Also notes diarrhea from her ostomy x few days and diffuse mid abdominal discomfort. Over last week blood sugars has been uncontrolled at home - in 400s and has been feeling weak / run down. also notes chills / sweats at night. no measured fever. Also stated that she has been recently having concerns that she has been difficulty taking care of her ostomy bag and concerns for ostomy reversal and was hoping to get supplies and cares from the hospital. There is diffuse urinary bladder inflammation suggesting cystitis. Per pt, she has been followed by Urologist associated with [facility] since SPT was originally placed 21 (does not recall name of Urologist). She has been getting SPT exchanged every month by [facility] 2/2 cannot tolerate bedside SPT exchange. Also states she need to be premedicated prior to procedure because she is allergic to contrast. Pt states 2 months ago, SPT was pulled out by doctor at [facility] to see if pt could urinate from urethra. Pt was able to urinate ok, and was sent home. Original SPT site closed and healed without complications. Pt states she preferred SPT 2/2 nonambulatory and has pain with urination so SPT was redone by IR at [facility] but new incision/insertion site was moved to left pubic area below ostomy site. Per pt, SPT was draining fine, but on 3/20 when nursing changed ostomy bag, SPT was dislodged. Of note, pt states she would like to establish care with a Urologist at [facility] since her other doctors are here. Since SPT became dislodged last night, pt has been urinating from urethra. Bladder scan 0cc.  + dysuria. Denies fevers, chills, nausea, vomiting, hematuria. Pt admitted to medicine for urosepsis. Pt is afebrile, hemodynamically stable. Urology requesting upsize of SPT for pt comfort. IR consulted for exchange.     Clinical History: SEPSIS AND UTI    No pertinent family history in first degree relatives    FH: diabetes mellitus    FH: hypertension    Handoff    MEWS Score    Essential hypertension    Hyperlipidemia    Diabetes    Obesity    Hernia    Abdominal hernia    Pre-eclampsia    Sepsis    Severe sepsis    Diabetes    Obesity    Essential hypertension    Health care maintenance    History of creation of ostomy    Abdominal pain    H/O LEEP    History of D&amp;C    H/O:     H/O ventral hernia repair    H/O exploratory laparotomy    MULTI SYPTOMS    Essential hypertension    90+    Urinary tract infection    [email or web address]        Meds:acetaminophen     Tablet .. 975 milliGRAM(s) Oral every 8 hours  albuterol/ipratropium for Nebulization 3 milliLiter(s) Nebulizer every 6 hours PRN  benzocaine/menthol Lozenge 1 Lozenge Oral every 8 hours PRN  benzonatate 100 milliGRAM(s) Oral every 8 hours PRN  dextrose 5%. 1000 milliLiter(s) IV Continuous <Continuous>  dextrose 5%. 1000 milliLiter(s) IV Continuous <Continuous>  dextrose 50% Injectable 25 Gram(s) IV Push once  dextrose 50% Injectable 12.5 Gram(s) IV Push once  dextrose 50% Injectable 25 Gram(s) IV Push once  dextrose Oral Gel 15 Gram(s) Oral once PRN  glucagon  Injectable 1 milliGRAM(s) IntraMuscular once  insulin glargine Injectable (LANTUS) 10 Unit(s) SubCutaneous once  insulin glargine Injectable (LANTUS) 60 Unit(s) SubCutaneous at bedtime  insulin lispro (ADMELOG) corrective regimen sliding scale   SubCutaneous Before meals and at bedtime  insulin lispro Injectable (ADMELOG) 30 Unit(s) SubCutaneous three times a day before meals  ketorolac   Injectable 15 milliGRAM(s) IV Push every 8 hours  labetalol 100 milliGRAM(s) Oral every 24 hours  losartan 50 milliGRAM(s) Oral daily  montelukast 10 milliGRAM(s) Oral every 24 hours  oxybutynin 5 milliGRAM(s) Oral every 8 hours  oxyCODONE    IR 5 milliGRAM(s) Oral every 4 hours PRN  sodium chloride 0.9%. 1000 milliLiter(s) IV Continuous <Continuous>      Allergies:amoxicillin (Unknown)  Bactrim I.V. (Rash (Mod to Severe))  clindamycin (Pruritus)  fish (Hives; Urticaria)  iodine containing compounds (Hives; Anaphylaxis)  penicillin (Hives)  shellfish. (Hives; Anaphylaxis)        Labs:                           10.9   6.65  )-----------( 345      ( 23 Mar 2023 09:06 )             36.6     PT/INR - ( 22 Mar 2023 05:30 )   PT: 12.1 sec;   INR: 1.02          PTT - ( 22 Mar 2023 05:30 )  PTT:29.5 sec      135  |  103  |  17  ----------------------------<  228<H>  4.7   |  23  |  0.74    Ca    8.2<L>      23 Mar 2023 09:06  Phos  3.8       Mg     2.0         TPro  6.4  /  Alb  3.0<L>  /  TBili  <0.2  /  DBili  x   /  AST  14  /  ALT  9<L>  /  AlkPhos  109            Imaging Findings: Bladder decompressed by suprapubic pigtail drainage catheter. Unchanged in position since previous study

## 2023-03-23 NOTE — PROGRESS NOTE ADULT - PROBLEM SELECTOR PLAN 7
F: none  E: replete prn  N: Diabetic diet  A: lovenox 40mg  Dispo: RMF. BMI> 35  - Patient uses walker for mobility so cant exercise and would benefit from Nutrition consult

## 2023-03-23 NOTE — PROGRESS NOTE ADULT - PROBLEM SELECTOR PLAN 6
F: none  E: replete prn  N: Diabetic diet  A: lovenox 40mg  Dispo: RMF. BMI> 35  - Patient uses walker for mobility so cant exercise and would benefit from Nutrition consult Patient w abdominal pain 2/2 to necrotizing fasc,   on oxycodone 5mg q 4 currently, but is still requiring dilaudid 1mg IV break through pushes  Plan:  - Will consider uptitration to [id number]  - iSTOP

## 2023-03-23 NOTE — CHART NOTE - NSCHARTNOTEFT_GEN_A_CORE
Others' Prescriptions  Patient Name: Rachel KatzBirth Date: 1980  [facility] [place], [place] [address]Jcv: Female  Rx Written	Rx Dispensed	Drug	Quantity	Days Supply	Prescriber Name  11/27/2022	12/01/2022	oxycodone hcl (ir) 10 mg tab	60	15	CharlesAmadou soler MD  11/14/2022	11/15/2022	oxycodone hcl (ir) 20 mg tab	120	30	Amadou Soto MD  10/23/2022	11/01/2022	oxycodone hcl (ir) 20 mg tab	56	14	Radha, [facility]  10/23/2022	10/23/2022	oxycodone hcl (ir) 10 mg tab	56	14	Radha, [facility]  10/16/2022	10/16/2022	oxycodone hcl (ir) 20 mg tab	60	30	Radha, [facility]  10/09/2022	10/09/2022	oxycodone hcl (ir) 10 mg tab	28	7	Pelon Leal MD  10/02/2022	10/03/2022	oxycodone hcl (ir) 20 mg tab	28	7	Pelon Leal MD  10/01/2022	10/01/2022	oxycodone hcl (ir) 10 mg tab	28	7	Pelon Leal MD  09/15/2022	09/15/2022	oxycodone hcl (ir) 30 mg tab	60	15	Amadou Soto MD    Patient Name: Rachel KatzBirth Date: 1980  Address[address] [place], [place] [address]Geg: Female  Rx Written	Rx Dispensed	Drug	Quantity	Days Supply	Prescriber Name  07/15/2022	07/24/2022	oxycodone hcl (ir) 30 mg tab	90	30	Adrienne Cruz MD  06/28/2022	07/15/2022	oxycodone hcl (ir) 30 mg tab	30	10	Adrienne Cruz MD  06/28/2022	07/11/2022	oxycodone hcl (ir) 30 mg tab	9	3	Adrienne Cruz MD

## 2023-03-23 NOTE — PROVIDER CONTACT NOTE (OTHER) - ASSESSMENT
Pt wants to see someone from the team to discuss about her suprapubic catheter leaking problem and pain control.

## 2023-03-23 NOTE — PROVIDER CONTACT NOTE (OTHER) - SITUATION
Pt w/ necrotizing fascitis (s/p suprapubic catheter and ostomy bag placement on 11/11/21 at [facility]) presents to ED with concerns of fatigue and night sweats for last few weeks
Pt w/ necrotizing fascitis (s/p suprapubic catheter and ostomy bag placement on 11/11/21 at [facility]) presents to ED with concerns of fatigue and night sweats for last few weeks

## 2023-03-23 NOTE — PROVIDER CONTACT NOTE (OTHER) - ACTION/TREATMENT ORDERED:
(MD) Tia Figueredo ordered RN to give the 12 units of ADMELOG and 60 units (LANTUS) and to recheck her fs in about 40 min. (MD) Tia Figueredo will also come to see the pt.
(MD) Tia Figueredo informed RN to repeat fs again

## 2023-03-23 NOTE — CONSULT NOTE ADULT - ASSESSMENT
Assessment: 43F with PMH of HTN, HLD, abdominal infection c/b Necrotizing fascitis (s/p suprapubic catheter and ostomy bag placement on 11/11/21 at [facility]), who presents to the ED with multiple complaints. Pt concerned that she has a urinary infection; has noticed cloudy / pink tinged urine output from suprapubic cath - last changed 4 weeks ago. feels there is some redness and pain at the cath insertion site. Per pt, she has been followed by Urologist associated with [facility] since SPT was originally placed 11/11/21 (does not recall name of Urologist). She has been getting SPT exchanged every month by [facility] 2/2 cannot tolerate bedside SPT exchange. Pt states she preferred SPT 2/2 nonambulatory and has pain with urination so SPT was redone by IR at [facility] but new incision/insertion site was moved to left pubic area below ostomy site. Urology requesting upsize of SPT for pt comfort. IR consulted for exchange. Case reviewed with Dr. Barnard, plan for procedure with sedation.     Recommendations: Maintain NPO x 8 hours prior to procedure. D/C blood thinners. Please ensure Covid swab is up to date (within last 72 hours).    Communicated with: Dr. Mckeon primary team

## 2023-03-23 NOTE — PROGRESS NOTE ADULT - SUBJECTIVE AND OBJECTIVE BOX
INTERVAL HPI/OVERNIGHT EVENTS:  O/N: Patient afebrile with stable vitals overnight. Gentamycin started for pt as per ID. Otherwise, required 2 dilaudid 1mg x 1 overnight.    This morning: Patient was seen and examined at bedside. Patient reluctant to continue with oxycodone as primary regimen for pain control. Plan for SPC exchange w IR today. Denies fever, chills, c/p, palpitations, SOB, cough, diarrhea, constipation, dysuria, hematuria, new LE swelling.    Vital Signs Last 24 Hrs  T(C): 37.1 (23 Mar 2023 05:18), Max: 37.1 (23 Mar 2023 05:18)  T(F): 98.8 (23 Mar 2023 05:18), Max: 98.8 (23 Mar 2023 05:18)  HR: 88 (23 Mar 2023 05:18) (85 - 88)  BP: 125/78 (23 Mar 2023 05:18) (124/79 - 148/80)  BP(mean): --  RR: 17 (23 Mar 2023 05:18) (16 - 17)  SpO2: 98% (23 Mar 2023 05:18) (96% - 98%)    Parameters below as of 23 Mar 2023 05:18  Patient On (Oxygen Delivery Method): room air      PHYSICAL EXAM:  Constitutional: large body habitus, NAD  HEENT: EOMI, sclera non-icteric, neck supple, trachea midline, no masses  Respiratory: CTA B/L. No crackles/rhonchi. No accessory muscle use.   Cardiovascular: RRR, normal S1S2, no M/R/G  Gastrointestinal: abdomen soft. Suprapubic tenderness to palpation. LLQ ostomy w/ stool output. Suprapubic cath w/o erythema/fluctuance at site. Suprapubic cath outputting urine  Extremities: Warm, well perfused, pulses equal bilateral upper and lower extremities, no edema  Neurological: AAOx3, CN Grossly intact. LLE 2/5 strength in knee flexion. Able to move L toes however limited to no passive ROM in L ankle.  RLE +3/5.  Skin: Normal temperature, warm, dry    MEDICATIONS  (STANDING):  acetaminophen     Tablet .. 975 milliGRAM(s) Oral every 8 hours  budesonide 160 MICROgram(s)/formoterol 4.5 MICROgram(s) Inhaler 2 Puff(s) Inhalation two times a day  dextrose 5%. 1000 milliLiter(s) (100 mL/Hr) IV Continuous <Continuous>  dextrose 5%. 1000 milliLiter(s) (50 mL/Hr) IV Continuous <Continuous>  dextrose 50% Injectable 25 Gram(s) IV Push once  dextrose 50% Injectable 12.5 Gram(s) IV Push once  dextrose 50% Injectable 25 Gram(s) IV Push once  glucagon  Injectable 1 milliGRAM(s) IntraMuscular once  insulin glargine Injectable (LANTUS) 20 Unit(s) SubCutaneous at bedtime  insulin lispro (ADMELOG) corrective regimen sliding scale   SubCutaneous Before meals and at bedtime  insulin lispro Injectable (ADMELOG) 30 Unit(s) SubCutaneous three times a day before meals  ketorolac   Injectable 15 milliGRAM(s) IV Push every 8 hours  losartan 50 milliGRAM(s) Oral daily  oxybutynin 5 milliGRAM(s) Oral every 8 hours  piperacillin/tazobactam IVPB.. 4.5 Gram(s) IV Intermittent every 8 hours  sodium chloride 0.9%. 1000 milliLiter(s) (60 mL/Hr) IV Continuous <Continuous>    MEDICATIONS  (PRN):  albuterol/ipratropium for Nebulization 3 milliLiter(s) Nebulizer every 6 hours PRN Shortness of Breath and/or Wheezing  benzocaine/menthol Lozenge 1 Lozenge Oral every 8 hours PRN Sore Throat  benzonatate 100 milliGRAM(s) Oral every 8 hours PRN Cough  dextrose Oral Gel 15 Gram(s) Oral once PRN Blood Glucose LESS THAN 70 milliGRAM(s)/deciliter  HYDROmorphone  Injectable 1 milliGRAM(s) IV Push every 4 hours PRN Severe Pain (7 - 10)      Allergies    amoxicillin (Unknown)  clindamycin (Pruritus)  fish (Hives; Urticaria)  iodine containing compounds (Hives; Anaphylaxis)  penicillin (Hives)  shellfish. (Hives; Anaphylaxis)    Intolerances    Bactrim I.V. (Rash (Mod to Severe))    LABS:                         11.4   7.23  )-----------( 402      ( 22 Mar 2023 05:30 )             38.1     03-22    135  |  100  |  12  ----------------------------<  271<H>  4.0   |  24  |  0.65    Ca    8.6      22 Mar 2023 05:30  Phos  3.4     03-22  Mg     1.7     03-22      PT/INR - ( 22 Mar 2023 05:30 )   PT: 12.1 sec;   INR: 1.02          PTT - ( 22 Mar 2023 05:30 )  PTT:29.5 sec              RADIOLOGY, EKG & ADDITIONAL TESTS:             RADIOLOGY, EKG & ADDITIONAL TESTS:   PT/INR - ( 21 Mar 2023 05:30 )   PT: 13.3 sec;   INR: 1.12          PTT - ( 21 Mar 2023 05:30 )  PTT:29.1 sec            RADIOLOGY & ADDITIONAL TESTS:  Reviewed INTERVAL HPI/OVERNIGHT EVENTS:  O/N: Patient afebrile with stable vitals overnight. Gentamycin started for pt as per ID. Otherwise, required 2 dilaudid 1mg x 1 overnight.    This morning: Patient was seen and examined at bedside. Patient reluctant to continue with oxycodone as primary regimen for pain control. Plan for SPC exchange w IR today. Otherwise reports head-ache. Denies fever, chills, c/p, palpitations, SOB, cough, diarrhea, constipation, dysuria, hematuria, new LE swelling.    Vital Signs Last 24 Hrs  T(C): 37.1 (23 Mar 2023 05:18), Max: 37.1 (23 Mar 2023 05:18)  T(F): 98.8 (23 Mar 2023 05:18), Max: 98.8 (23 Mar 2023 05:18)  HR: 88 (23 Mar 2023 05:18) (85 - 88)  BP: 125/78 (23 Mar 2023 05:18) (124/79 - 148/80)  BP(mean): --  RR: 17 (23 Mar 2023 05:18) (16 - 17)  SpO2: 98% (23 Mar 2023 05:18) (96% - 98%)    Parameters below as of 23 Mar 2023 05:18  Patient On (Oxygen Delivery Method): room air      PHYSICAL EXAM:  Constitutional: large body habitus, NAD  HEENT: EOMI, sclera non-icteric, neck supple, trachea midline, no masses  Respiratory: CTA B/L. No crackles/rhonchi. No accessory muscle use.   Cardiovascular: RRR, normal S1S2, no M/R/G  Gastrointestinal: abdomen soft. Suprapubic tenderness to palpation. LLQ ostomy w/ stool output. Suprapubic cath w/o erythema/fluctuance at site. Suprapubic cath outputting urine  Extremities: Warm, well perfused, pulses equal bilateral upper and lower extremities, no edema  Neurological: AAOx3, CN Grossly intact. LLE 2/5 strength in knee flexion. Able to move L toes however limited to no passive ROM in L ankle.  RLE +3/5.  Skin: Normal temperature, warm, dry    MEDICATIONS  (STANDING):  acetaminophen     Tablet .. 975 milliGRAM(s) Oral every 8 hours  budesonide 160 MICROgram(s)/formoterol 4.5 MICROgram(s) Inhaler 2 Puff(s) Inhalation two times a day  dextrose 5%. 1000 milliLiter(s) (100 mL/Hr) IV Continuous <Continuous>  dextrose 5%. 1000 milliLiter(s) (50 mL/Hr) IV Continuous <Continuous>  dextrose 50% Injectable 25 Gram(s) IV Push once  dextrose 50% Injectable 12.5 Gram(s) IV Push once  dextrose 50% Injectable 25 Gram(s) IV Push once  glucagon  Injectable 1 milliGRAM(s) IntraMuscular once  insulin glargine Injectable (LANTUS) 20 Unit(s) SubCutaneous at bedtime  insulin lispro (ADMELOG) corrective regimen sliding scale   SubCutaneous Before meals and at bedtime  insulin lispro Injectable (ADMELOG) 30 Unit(s) SubCutaneous three times a day before meals  ketorolac   Injectable 15 milliGRAM(s) IV Push every 8 hours  losartan 50 milliGRAM(s) Oral daily  oxybutynin 5 milliGRAM(s) Oral every 8 hours  piperacillin/tazobactam IVPB.. 4.5 Gram(s) IV Intermittent every 8 hours  sodium chloride 0.9%. 1000 milliLiter(s) (60 mL/Hr) IV Continuous <Continuous>    MEDICATIONS  (PRN):  albuterol/ipratropium for Nebulization 3 milliLiter(s) Nebulizer every 6 hours PRN Shortness of Breath and/or Wheezing  benzocaine/menthol Lozenge 1 Lozenge Oral every 8 hours PRN Sore Throat  benzonatate 100 milliGRAM(s) Oral every 8 hours PRN Cough  dextrose Oral Gel 15 Gram(s) Oral once PRN Blood Glucose LESS THAN 70 milliGRAM(s)/deciliter  HYDROmorphone  Injectable 1 milliGRAM(s) IV Push every 4 hours PRN Severe Pain (7 - 10)      Allergies    amoxicillin (Unknown)  clindamycin (Pruritus)  fish (Hives; Urticaria)  iodine containing compounds (Hives; Anaphylaxis)  penicillin (Hives)  shellfish. (Hives; Anaphylaxis)    Intolerances    Bactrim I.V. (Rash (Mod to Severe))    LABS:                         11.4   7.23  )-----------( 402      ( 22 Mar 2023 05:30 )             38.1     03-22    135  |  100  |  12  ----------------------------<  271<H>  4.0   |  24  |  0.65    Ca    8.6      22 Mar 2023 05:30  Phos  3.4     03-22  Mg     1.7     03-22      PT/INR - ( 22 Mar 2023 05:30 )   PT: 12.1 sec;   INR: 1.02          PTT - ( 22 Mar 2023 05:30 )  PTT:29.5 sec              RADIOLOGY, EKG & ADDITIONAL TESTS:             RADIOLOGY, EKG & ADDITIONAL TESTS:   PT/INR - ( 21 Mar 2023 05:30 )   PT: 13.3 sec;   INR: 1.12          PTT - ( 21 Mar 2023 05:30 )  PTT:29.1 sec            RADIOLOGY & ADDITIONAL TESTS:  Reviewed

## 2023-03-23 NOTE — PROGRESS NOTE ADULT - PROBLEM SELECTOR PLAN 5
BMI> 35  - Patient uses walker for mobility so cant exercise and would benefit from Nutrition consult Patient w abdominal pain 2/2 to necrotizing fasc,   on oxycodone 5mg q 4 currently, but is still requiring dilaudid 1mg IV break through pushes  Plan:  - Will consider uptitration to [id number]  - iSTOP Home med: Labetalol 100mg qd and Losartan 50mg qd  - Restarted losartan 50mg qd.  - Continue labetalol 100mg qd.

## 2023-03-23 NOTE — PROGRESS NOTE ADULT - PROBLEM SELECTOR PLAN 3
A1c of 10 on previous admission   Home med: Admelog 35units premeals and Humulin 160u TID   Hyperglycemic oh admission with FS>500, downtrended afer fluids and Insulin s/q   - A1c 12.5   - No acidosis or AG  - Endocrinology consulted; recommending custom ISS  - Lantus 60 U.  - Admelog 30 units pre-meals   - F/u recs. Placed on 11/11/21 at [facility] due to abdominal necrotizing fascitis   Plan:  - Ostomy care and wound care education   - Wound care consulted, f/u recs.-> will see pt today 3/23/23.  - Patient is interested in having care and here at [facility] and establishing follow up with surgery here for reversal as she told she was informed about the reversal from her surgery team at other hospital but was lost to follow up

## 2023-03-23 NOTE — PROVIDER CONTACT NOTE (OTHER) - ASSESSMENT
T: 98.7F; HR: 103; BP: 148/85, RR" 18, SPO2: 95%.  Pt reports that she is upset and her stress levels and pain are the cause of her high sugar.

## 2023-03-24 DIAGNOSIS — E11.65 TYPE 2 DIABETES MELLITUS WITH HYPERGLYCEMIA: ICD-10-CM

## 2023-03-24 DIAGNOSIS — G89.4 CHRONIC PAIN SYNDROME: ICD-10-CM

## 2023-03-24 DIAGNOSIS — E87.5 HYPERKALEMIA: ICD-10-CM

## 2023-03-24 DIAGNOSIS — E87.1 HYPO-OSMOLALITY AND HYPONATREMIA: ICD-10-CM

## 2023-03-24 DIAGNOSIS — T83.511A INFECTION AND INFLAMMATORY REACTION DUE TO INDWELLING URETHRAL CATHETER, INITIAL ENCOUNTER: ICD-10-CM

## 2023-03-24 LAB
ALBUMIN SERPL ELPH-MCNC: 3.6 G/DL — SIGNIFICANT CHANGE UP (ref 3.3–5)
ALP SERPL-CCNC: 139 U/L — HIGH (ref 40–120)
ALT FLD-CCNC: 12 U/L — SIGNIFICANT CHANGE UP (ref 10–45)
ANION GAP SERPL CALC-SCNC: 10 MMOL/L — SIGNIFICANT CHANGE UP (ref 5–17)
ANION GAP SERPL CALC-SCNC: 13 MMOL/L — SIGNIFICANT CHANGE UP (ref 5–17)
ANION GAP SERPL CALC-SCNC: 14 MMOL/L — SIGNIFICANT CHANGE UP (ref 5–17)
AST SERPL-CCNC: 14 U/L — SIGNIFICANT CHANGE UP (ref 10–40)
B-OH-BUTYR SERPL-SCNC: 0.2 MMOL/L — SIGNIFICANT CHANGE UP
BASOPHILS # BLD AUTO: 0.01 K/UL — SIGNIFICANT CHANGE UP (ref 0–0.2)
BASOPHILS NFR BLD AUTO: 0.1 % — SIGNIFICANT CHANGE UP (ref 0–2)
BILIRUB SERPL-MCNC: 0.2 MG/DL — SIGNIFICANT CHANGE UP (ref 0.2–1.2)
BUN SERPL-MCNC: 20 MG/DL — SIGNIFICANT CHANGE UP (ref 7–23)
BUN SERPL-MCNC: 21 MG/DL — SIGNIFICANT CHANGE UP (ref 7–23)
BUN SERPL-MCNC: 24 MG/DL — HIGH (ref 7–23)
CALCIUM SERPL-MCNC: 9.3 MG/DL — SIGNIFICANT CHANGE UP (ref 8.4–10.5)
CALCIUM SERPL-MCNC: 9.4 MG/DL — SIGNIFICANT CHANGE UP (ref 8.4–10.5)
CALCIUM SERPL-MCNC: 9.5 MG/DL — SIGNIFICANT CHANGE UP (ref 8.4–10.5)
CHLORIDE SERPL-SCNC: 100 MMOL/L — SIGNIFICANT CHANGE UP (ref 96–108)
CHLORIDE SERPL-SCNC: 98 MMOL/L — SIGNIFICANT CHANGE UP (ref 96–108)
CHLORIDE SERPL-SCNC: 98 MMOL/L — SIGNIFICANT CHANGE UP (ref 96–108)
CO2 SERPL-SCNC: 18 MMOL/L — LOW (ref 22–31)
CO2 SERPL-SCNC: 19 MMOL/L — LOW (ref 22–31)
CO2 SERPL-SCNC: 21 MMOL/L — LOW (ref 22–31)
CREAT SERPL-MCNC: 0.69 MG/DL — SIGNIFICANT CHANGE UP (ref 0.5–1.3)
CREAT SERPL-MCNC: 0.75 MG/DL — SIGNIFICANT CHANGE UP (ref 0.5–1.3)
CREAT SERPL-MCNC: 0.85 MG/DL — SIGNIFICANT CHANGE UP (ref 0.5–1.3)
CULTURE RESULTS: SIGNIFICANT CHANGE UP
CULTURE RESULTS: SIGNIFICANT CHANGE UP
EGFR: 101 ML/MIN/1.73M2 — SIGNIFICANT CHANGE UP
EGFR: 110 ML/MIN/1.73M2 — SIGNIFICANT CHANGE UP
EGFR: 87 ML/MIN/1.73M2 — SIGNIFICANT CHANGE UP
EOSINOPHIL # BLD AUTO: 0 K/UL — SIGNIFICANT CHANGE UP (ref 0–0.5)
EOSINOPHIL NFR BLD AUTO: 0 % — SIGNIFICANT CHANGE UP (ref 0–6)
GLUCOSE BLDC GLUCOMTR-MCNC: 264 MG/DL — HIGH (ref 70–99)
GLUCOSE BLDC GLUCOMTR-MCNC: 291 MG/DL — HIGH (ref 70–99)
GLUCOSE BLDC GLUCOMTR-MCNC: 352 MG/DL — HIGH (ref 70–99)
GLUCOSE BLDC GLUCOMTR-MCNC: 435 MG/DL — HIGH (ref 70–99)
GLUCOSE BLDC GLUCOMTR-MCNC: 474 MG/DL — CRITICAL HIGH (ref 70–99)
GLUCOSE BLDC GLUCOMTR-MCNC: 476 MG/DL — CRITICAL HIGH (ref 70–99)
GLUCOSE BLDC GLUCOMTR-MCNC: 488 MG/DL — CRITICAL HIGH (ref 70–99)
GLUCOSE BLDC GLUCOMTR-MCNC: 533 MG/DL — CRITICAL HIGH (ref 70–99)
GLUCOSE SERPL-MCNC: 292 MG/DL — HIGH (ref 70–99)
GLUCOSE SERPL-MCNC: 497 MG/DL — CRITICAL HIGH (ref 70–99)
GLUCOSE SERPL-MCNC: 587 MG/DL — CRITICAL HIGH (ref 70–99)
HCT VFR BLD CALC: 41.6 % — SIGNIFICANT CHANGE UP (ref 34.5–45)
HGB BLD-MCNC: 12.5 G/DL — SIGNIFICANT CHANGE UP (ref 11.5–15.5)
IMM GRANULOCYTES NFR BLD AUTO: 0.9 % — SIGNIFICANT CHANGE UP (ref 0–0.9)
LYMPHOCYTES # BLD AUTO: 1.5 K/UL — SIGNIFICANT CHANGE UP (ref 1–3.3)
LYMPHOCYTES # BLD AUTO: 13.1 % — SIGNIFICANT CHANGE UP (ref 13–44)
MAGNESIUM SERPL-MCNC: 2.1 MG/DL — SIGNIFICANT CHANGE UP (ref 1.6–2.6)
MCHC RBC-ENTMCNC: 23.9 PG — LOW (ref 27–34)
MCHC RBC-ENTMCNC: 30 GM/DL — LOW (ref 32–36)
MCV RBC AUTO: 79.5 FL — LOW (ref 80–100)
MONOCYTES # BLD AUTO: 0.36 K/UL — SIGNIFICANT CHANGE UP (ref 0–0.9)
MONOCYTES NFR BLD AUTO: 3.1 % — SIGNIFICANT CHANGE UP (ref 2–14)
NEUTROPHILS # BLD AUTO: 9.48 K/UL — HIGH (ref 1.8–7.4)
NEUTROPHILS NFR BLD AUTO: 82.8 % — HIGH (ref 43–77)
NRBC # BLD: 0 /100 WBCS — SIGNIFICANT CHANGE UP (ref 0–0)
PHOSPHATE SERPL-MCNC: 2.7 MG/DL — SIGNIFICANT CHANGE UP (ref 2.5–4.5)
PLATELET # BLD AUTO: 479 K/UL — HIGH (ref 150–400)
POTASSIUM SERPL-MCNC: 4.7 MMOL/L — SIGNIFICANT CHANGE UP (ref 3.5–5.3)
POTASSIUM SERPL-MCNC: 5.4 MMOL/L — HIGH (ref 3.5–5.3)
POTASSIUM SERPL-MCNC: 5.8 MMOL/L — HIGH (ref 3.5–5.3)
POTASSIUM SERPL-SCNC: 4.7 MMOL/L — SIGNIFICANT CHANGE UP (ref 3.5–5.3)
POTASSIUM SERPL-SCNC: 5.4 MMOL/L — HIGH (ref 3.5–5.3)
POTASSIUM SERPL-SCNC: 5.8 MMOL/L — HIGH (ref 3.5–5.3)
PROT SERPL-MCNC: 7.5 G/DL — SIGNIFICANT CHANGE UP (ref 6–8.3)
RBC # BLD: 5.23 M/UL — HIGH (ref 3.8–5.2)
RBC # FLD: 14.5 % — SIGNIFICANT CHANGE UP (ref 10.3–14.5)
SODIUM SERPL-SCNC: 126 MMOL/L — LOW (ref 135–145)
SODIUM SERPL-SCNC: 131 MMOL/L — LOW (ref 135–145)
SODIUM SERPL-SCNC: 134 MMOL/L — LOW (ref 135–145)
SPECIMEN SOURCE: SIGNIFICANT CHANGE UP
SPECIMEN SOURCE: SIGNIFICANT CHANGE UP
WBC # BLD: 11.45 K/UL — HIGH (ref 3.8–10.5)
WBC # FLD AUTO: 11.45 K/UL — HIGH (ref 3.8–10.5)

## 2023-03-24 PROCEDURE — 99232 SBSQ HOSP IP/OBS MODERATE 35: CPT

## 2023-03-24 PROCEDURE — 99233 SBSQ HOSP IP/OBS HIGH 50: CPT | Mod: GC

## 2023-03-24 RX ORDER — DIPHENHYDRAMINE HCL 50 MG
25 CAPSULE ORAL ONCE
Refills: 0 | Status: COMPLETED | OUTPATIENT
Start: 2023-03-24 | End: 2023-03-24

## 2023-03-24 RX ORDER — DIPHENHYDRAMINE HCL 50 MG
50 CAPSULE ORAL ONCE
Refills: 0 | Status: COMPLETED | OUTPATIENT
Start: 2023-03-24 | End: 2023-03-24

## 2023-03-24 RX ORDER — ENOXAPARIN SODIUM 100 MG/ML
40 INJECTION SUBCUTANEOUS EVERY 24 HOURS
Refills: 0 | Status: DISCONTINUED | OUTPATIENT
Start: 2023-03-24 | End: 2023-03-27

## 2023-03-24 RX ORDER — HYDROMORPHONE HYDROCHLORIDE 2 MG/ML
1 INJECTION INTRAMUSCULAR; INTRAVENOUS; SUBCUTANEOUS ONCE
Refills: 0 | Status: DISCONTINUED | OUTPATIENT
Start: 2023-03-24 | End: 2023-03-24

## 2023-03-24 RX ORDER — HUMAN INSULIN 100 [IU]/ML
15 INJECTION, SUSPENSION SUBCUTANEOUS ONCE
Refills: 0 | Status: COMPLETED | OUTPATIENT
Start: 2023-03-24 | End: 2023-03-24

## 2023-03-24 RX ORDER — OXYCODONE HYDROCHLORIDE 5 MG/1
1 TABLET ORAL
Qty: 28 | Refills: 0
Start: 2023-03-24 | End: 2023-03-30

## 2023-03-24 RX ORDER — OXYCODONE HYDROCHLORIDE 5 MG/1
20 TABLET ORAL EVERY 4 HOURS
Refills: 0 | Status: DISCONTINUED | OUTPATIENT
Start: 2023-03-24 | End: 2023-03-27

## 2023-03-24 RX ORDER — HYDROMORPHONE HYDROCHLORIDE 2 MG/ML
1 INJECTION INTRAMUSCULAR; INTRAVENOUS; SUBCUTANEOUS EVERY 4 HOURS
Refills: 0 | Status: DISCONTINUED | OUTPATIENT
Start: 2023-03-24 | End: 2023-03-24

## 2023-03-24 RX ORDER — INSULIN LISPRO 100/ML
8 VIAL (ML) SUBCUTANEOUS ONCE
Refills: 0 | Status: COMPLETED | OUTPATIENT
Start: 2023-03-24 | End: 2023-03-24

## 2023-03-24 RX ORDER — INSULIN LISPRO 100/ML
VIAL (ML) SUBCUTANEOUS
Refills: 0 | Status: DISCONTINUED | OUTPATIENT
Start: 2023-03-24 | End: 2023-03-27

## 2023-03-24 RX ORDER — SODIUM CHLORIDE 9 MG/ML
1000 INJECTION, SOLUTION INTRAVENOUS
Refills: 0 | Status: DISCONTINUED | OUTPATIENT
Start: 2023-03-24 | End: 2023-03-24

## 2023-03-24 RX ORDER — OXYCODONE HYDROCHLORIDE 5 MG/1
10 TABLET ORAL EVERY 4 HOURS
Refills: 0 | Status: DISCONTINUED | OUTPATIENT
Start: 2023-03-24 | End: 2023-03-27

## 2023-03-24 RX ORDER — HYDROMORPHONE HYDROCHLORIDE 2 MG/ML
0.5 INJECTION INTRAMUSCULAR; INTRAVENOUS; SUBCUTANEOUS ONCE
Refills: 0 | Status: DISCONTINUED | OUTPATIENT
Start: 2023-03-24 | End: 2023-03-24

## 2023-03-24 RX ADMIN — HYDROMORPHONE HYDROCHLORIDE 1 MILLIGRAM(S): 2 INJECTION INTRAMUSCULAR; INTRAVENOUS; SUBCUTANEOUS at 00:55

## 2023-03-24 RX ADMIN — MONTELUKAST 10 MILLIGRAM(S): 4 TABLET, CHEWABLE ORAL at 14:05

## 2023-03-24 RX ADMIN — HYDROMORPHONE HYDROCHLORIDE 1 MILLIGRAM(S): 2 INJECTION INTRAMUSCULAR; INTRAVENOUS; SUBCUTANEOUS at 07:51

## 2023-03-24 RX ADMIN — Medication 5 MILLIGRAM(S): at 14:05

## 2023-03-24 RX ADMIN — Medication 25 MILLIGRAM(S): at 14:05

## 2023-03-24 RX ADMIN — Medication 12: at 09:19

## 2023-03-24 RX ADMIN — Medication 12: at 21:36

## 2023-03-24 RX ADMIN — Medication 30 UNIT(S): at 07:02

## 2023-03-24 RX ADMIN — INSULIN GLARGINE 10 UNIT(S): 100 INJECTION, SOLUTION SUBCUTANEOUS at 07:07

## 2023-03-24 RX ADMIN — Medication 12: at 11:20

## 2023-03-24 RX ADMIN — HYDROMORPHONE HYDROCHLORIDE 0.5 MILLIGRAM(S): 2 INJECTION INTRAMUSCULAR; INTRAVENOUS; SUBCUTANEOUS at 12:50

## 2023-03-24 RX ADMIN — Medication 50 MILLIGRAM(S): at 10:42

## 2023-03-24 RX ADMIN — HYDROMORPHONE HYDROCHLORIDE 1 MILLIGRAM(S): 2 INJECTION INTRAMUSCULAR; INTRAVENOUS; SUBCUTANEOUS at 08:29

## 2023-03-24 RX ADMIN — HYDROMORPHONE HYDROCHLORIDE 0.5 MILLIGRAM(S): 2 INJECTION INTRAMUSCULAR; INTRAVENOUS; SUBCUTANEOUS at 04:39

## 2023-03-24 RX ADMIN — Medication 5 MILLIGRAM(S): at 23:48

## 2023-03-24 RX ADMIN — Medication 100 MILLIGRAM(S): at 12:14

## 2023-03-24 RX ADMIN — Medication 15 MILLIGRAM(S): at 00:22

## 2023-03-24 RX ADMIN — Medication 15 MILLIGRAM(S): at 16:23

## 2023-03-24 RX ADMIN — Medication 30 UNIT(S): at 11:19

## 2023-03-24 RX ADMIN — HYDROMORPHONE HYDROCHLORIDE 0.5 MILLIGRAM(S): 2 INJECTION INTRAMUSCULAR; INTRAVENOUS; SUBCUTANEOUS at 12:54

## 2023-03-24 RX ADMIN — SODIUM CHLORIDE 500 MILLILITER(S): 9 INJECTION, SOLUTION INTRAVENOUS at 00:36

## 2023-03-24 RX ADMIN — Medication 200 MILLIGRAM(S): at 10:57

## 2023-03-24 RX ADMIN — Medication 18: at 17:01

## 2023-03-24 RX ADMIN — Medication 15 MILLIGRAM(S): at 07:10

## 2023-03-24 RX ADMIN — HUMAN INSULIN 15 UNIT(S): 100 INJECTION, SUSPENSION SUBCUTANEOUS at 09:58

## 2023-03-24 RX ADMIN — Medication 8 UNIT(S): at 02:27

## 2023-03-24 RX ADMIN — HYDROMORPHONE HYDROCHLORIDE 1 MILLIGRAM(S): 2 INJECTION INTRAMUSCULAR; INTRAVENOUS; SUBCUTANEOUS at 00:36

## 2023-03-24 RX ADMIN — Medication 15 MILLIGRAM(S): at 16:25

## 2023-03-24 RX ADMIN — LOSARTAN POTASSIUM 50 MILLIGRAM(S): 100 TABLET, FILM COATED ORAL at 07:02

## 2023-03-24 RX ADMIN — Medication 5 MILLIGRAM(S): at 07:02

## 2023-03-24 RX ADMIN — Medication 30 UNIT(S): at 17:01

## 2023-03-24 RX ADMIN — ENOXAPARIN SODIUM 40 MILLIGRAM(S): 100 INJECTION SUBCUTANEOUS at 21:36

## 2023-03-24 RX ADMIN — OXYCODONE HYDROCHLORIDE 20 MILLIGRAM(S): 5 TABLET ORAL at 21:37

## 2023-03-24 RX ADMIN — HYDROMORPHONE HYDROCHLORIDE 0.5 MILLIGRAM(S): 2 INJECTION INTRAMUSCULAR; INTRAVENOUS; SUBCUTANEOUS at 05:00

## 2023-03-24 RX ADMIN — INSULIN GLARGINE 60 UNIT(S): 100 INJECTION, SOLUTION SUBCUTANEOUS at 21:36

## 2023-03-24 NOTE — PROGRESS NOTE ADULT - PROBLEM SELECTOR PLAN 5
Home med: Labetalol 100mg qd and Losartan 50mg qd  Plan:  - Restarted losartan 50mg qd.  - Continue labetalol 100mg qd.

## 2023-03-24 NOTE — PROGRESS NOTE ADULT - PROBLEM SELECTOR PLAN 3
Placed on 11/11/21 at [facility] due to abdominal necrotizing fascitis   Plan:  - Ostomy care and wound care education   - Wound care consulted, f/u recs.-> no further recs  - Will provider scripts for outpt care.

## 2023-03-24 NOTE — PROGRESS NOTE ADULT - SUBJECTIVE AND OBJECTIVE BOX
INTERVAL HPI/OVERNIGHT EVENTS:  O/N: Patient with severely elevated FSG overnight, received 20U sliding coverage, LR 500cc for 2 hours, and dilaudid 1mg x1.    This morning: Patient was seen and examined at bedside. Patient reluctant to continue with oxycodone as primary regimen for pain control. Says oxycodoen 5mg and dilaudid .5mg regimen is not working. Otherwise received AM lantus 10U, lispro 30U, sliding coverage, and NPH 15u after consultation w endocrine. Patient denies any new pain, discharge, or blood from SPC site. Feels headache. Denies fever, chills, c/p, palpitations, SOB, cough, diarrhea, constipation, dysuria, hematuria, new LE swelling.    Vital Signs Last 24 Hrs  T(C): 36.8 (24 Mar 2023 08:25), Max: 37.1 (23 Mar 2023 20:15)  T(F): 98.3 (24 Mar 2023 08:25), Max: 98.7 (23 Mar 2023 20:15)  HR: 93 (24 Mar 2023 08:25) (73 - 104)  BP: 157/93 (24 Mar 2023 08:25) (118/68 - 157/93)  BP(mean): 107 (24 Mar 2023 05:00) (107 - 107)  RR: 17 (24 Mar 2023 08:25) (17 - 17)  SpO2: 97% (24 Mar 2023 08:25) (95% - 97%)    Parameters below as of 24 Mar 2023 08:25  Patient On (Oxygen Delivery Method): room air      PHYSICAL EXAM:  Constitutional: large body habitus, NAD  HEENT: EOMI, sclera non-icteric, neck supple, trachea midline, no masses  Respiratory: CTA B/L. No crackles/rhonchi. No accessory muscle use.   Cardiovascular: RRR, normal S1S2, no M/R/G  Gastrointestinal: abdomen soft. Suprapubic tenderness to palpation. LLQ ostomy w/ stool output. Suprapubic cath w/o erythema/fluctuance at site. No hematoma/ fluctuance at site.  Extremities: Warm, well perfused, pulses equal bilateral upper and lower extremities, no edema  Neurological: AAOx3, CN Grossly intact. LLE 2/5 strength in knee flexion. Able to move L toes however limited to no passive ROM in L ankle.  RLE +3/5.  Skin: Normal temperature, warm, dry    MEDICATIONS  (STANDING):  acetaminophen     Tablet .. 975 milliGRAM(s) Oral every 8 hours  budesonide 160 MICROgram(s)/formoterol 4.5 MICROgram(s) Inhaler 2 Puff(s) Inhalation two times a day  dextrose 5%. 1000 milliLiter(s) (100 mL/Hr) IV Continuous <Continuous>  dextrose 5%. 1000 milliLiter(s) (50 mL/Hr) IV Continuous <Continuous>  dextrose 50% Injectable 25 Gram(s) IV Push once  dextrose 50% Injectable 12.5 Gram(s) IV Push once  dextrose 50% Injectable 25 Gram(s) IV Push once  glucagon  Injectable 1 milliGRAM(s) IntraMuscular once  insulin glargine Injectable (LANTUS) 20 Unit(s) SubCutaneous at bedtime  insulin lispro (ADMELOG) corrective regimen sliding scale   SubCutaneous Before meals and at bedtime  insulin lispro Injectable (ADMELOG) 30 Unit(s) SubCutaneous three times a day before meals  ketorolac   Injectable 15 milliGRAM(s) IV Push every 8 hours  losartan 50 milliGRAM(s) Oral daily  oxybutynin 5 milliGRAM(s) Oral every 8 hours  piperacillin/tazobactam IVPB.. 4.5 Gram(s) IV Intermittent every 8 hours  sodium chloride 0.9%. 1000 milliLiter(s) (60 mL/Hr) IV Continuous <Continuous>    MEDICATIONS  (PRN):  albuterol/ipratropium for Nebulization 3 milliLiter(s) Nebulizer every 6 hours PRN Shortness of Breath and/or Wheezing  benzocaine/menthol Lozenge 1 Lozenge Oral every 8 hours PRN Sore Throat  benzonatate 100 milliGRAM(s) Oral every 8 hours PRN Cough  dextrose Oral Gel 15 Gram(s) Oral once PRN Blood Glucose LESS THAN 70 milliGRAM(s)/deciliter  HYDROmorphone  Injectable 1 milliGRAM(s) IV Push every 4 hours PRN Severe Pain (7 - 10)      Allergies    amoxicillin (Unknown)  clindamycin (Pruritus)  fish (Hives; Urticaria)  iodine containing compounds (Hives; Anaphylaxis)  penicillin (Hives)  shellfish. (Hives; Anaphylaxis)    Intolerances    Bactrim I.V. (Rash (Mod to Severe))    LABS:                         12.5   11.45 )-----------( 479      ( 24 Mar 2023 07:55 )             41.6     03-24    131<L>  |  98  |  20  ----------------------------<  497<HH>  5.4<H>   |  19<L>  |  0.69    Ca    9.5      24 Mar 2023 07:55  Phos  2.7     03-24  Mg     2.1     03-24    TPro  7.5  /  Alb  3.6  /  TBili  0.2  /  DBili  x   /  AST  14  /  ALT  12  /  AlkPhos  139<H>  03-24                  RADIOLOGY, EKG & ADDITIONAL TESTS:   RADIOLOGY & ADDITIONAL TESTS:  Reviewed

## 2023-03-24 NOTE — PROVIDER CONTACT NOTE (CRITICAL VALUE NOTIFICATION) - SITUATION
43 year old female with necrotising fascitis, s/p suprapubic catheter and ostomy bag placement on 11/11/21 at [facility], who present to the ED with concerns of fatigue and episodes of night sweats for last few weeks.

## 2023-03-24 NOTE — PROGRESS NOTE ADULT - SUBJECTIVE AND OBJECTIVE BOX
OVERNIGHT: No acute events overnight.   SUBJECTIVE: Patient was seen and examined this morning. She had suprapubic catheter exchange yesterday and received solumedrol 125mg at 3:30 p fro contrast allergy. Had subsequent hyperglycemia. She had been refusing AM lantus, but took today + NPH. Cough has improved today. She has pruritus form contrast- receiving benadryl.  Continues to have cough. She has intermittent nausea which impacts for food intake. She has many diet limitation based on preference. She has no sign of lipohypertrophy though her rotation of injection site is limited due to ostomy, thigh skin graft, and pain on injection to outer abdomen. It seems she drinks juice out of fear of hypoglycemia after injecting insulin, especially when she has por PO intake. She does not adjust insulin to compensate for varying food intake.     Diet:  Dinner - yogurt parfait and salad  Breakfast - yogurt parfait and milk.      CAPILLARY BLOOD GLUCOSE & INSULIN RECEIVED  352 mg/dL (03-24 @ 16:51)  264 mg/dL (03-24 @ 11:10) - Lispro 30 + 12.  488 mg/dL (03-24 @ 07:38) - Lantus 10 + lispro 30 + 12. NPH 15 units given at 9AM  435 mg/dL (03-24 @ 06:44)  474 mg/dL (03-24 @ 04:08)  476 mg/dL (03-24 @ 01:37) - lispro 8  533 mg/dL (03-24 @ 00:17)  528 mg/dL (03-23 @ 23:29)  551 mg/dL (03-23 @ 23:18) - Lantus 60 + lispro 12    REVIEW OF SYSTEMS  Constitutional:  (+) Decreased appetite. Negative fever, chills.  Cardiovascular:  Negative for chest pain or palpitations.  Respiratory:  Negative for cough, wheezing, or shortness of breath.    Gastrointestinal:  (+) Abdominal pain and nausea, Negative for vomiting, diarrhea, constipation.  Neurologic:  No headache, confusion, dizziness, lightheadedness.     PHYSICAL EXAM  Vital Signs Last 24 Hrs  T(C): 36.7 (24 Mar 2023 17:14), Max: 37.1 (23 Mar 2023 20:15)  T(F): 98 (24 Mar 2023 17:14), Max: 98.7 (23 Mar 2023 20:15)  HR: 100 (24 Mar 2023 17:14) (93 - 104)  BP: 144/72 (24 Mar 2023 17:14) (144/72 - 157/93)  BP(mean): 107 (24 Mar 2023 05:00) (107 - 107)  RR: 17 (24 Mar 2023 17:14) (17 - 17)  SpO2: 98% (24 Mar 2023 17:14) (96% - 98%)    Parameters below as of 24 Mar 2023 17:14  Patient On (Oxygen Delivery Method): room air  Constitutional: Awake, alert, female, in no acute distress. OBese.  HEENT: Normocephalic, atraumatic, NAOMI.  Respiratory: Lungs clear to ausculation bilaterally.   Cardiovascular: regular rhythm, normal S1 and S2, no audible murmurs.   GI: soft, mildly tender, non-distended, (+) Ostomy. (+) Suprapubic catheter.   Extremities: No lower extremity edema.  Psychiatric: AAO x 3. Normal affect/mood. .     LABS  CBC - WBC/HGB/HTC/PLT: 11.45/12.5/41.6/479 (03-24-23)  BMP - Na/K/Cl/Bicarb/BUN/Cr/Gluc: 134/4.7/100/21/24/0.75/292 (03-24-23)  Anion Gap: 13 (03-24-23)  eGFR: 101 (03-24-23)  Calcium: 9.4 (03-24-23)  Phosphorus: -- (03-24-23)  Magnesium: -- (03-24-23)  LFT - Alb/Tprot/Tbili/Dbili/AlkPhos/ALT/AST: 3.6/--/0.2/--/139/12/14 (03-24-23)  PT/aPTT/INR: 12.1/29.5/1.02 (03-22-23)         MEDICATIONS  MEDICATIONS  (STANDING):  acetaminophen     Tablet .. 975 milliGRAM(s) Oral every 8 hours  dextrose 5%. 1000 milliLiter(s) (100 mL/Hr) IV Continuous <Continuous>  dextrose 5%. 1000 milliLiter(s) (50 mL/Hr) IV Continuous <Continuous>  dextrose 50% Injectable 25 Gram(s) IV Push once  dextrose 50% Injectable 12.5 Gram(s) IV Push once  dextrose 50% Injectable 25 Gram(s) IV Push once  enoxaparin Injectable 40 milliGRAM(s) SubCutaneous every 24 hours  glucagon  Injectable 1 milliGRAM(s) IntraMuscular once  insulin glargine Injectable (LANTUS) 10 Unit(s) SubCutaneous every morning  insulin glargine Injectable (LANTUS) 60 Unit(s) SubCutaneous at bedtime  insulin lispro (ADMELOG) corrective regimen sliding scale   SubCutaneous Before meals and at bedtime  insulin lispro Injectable (ADMELOG) 30 Unit(s) SubCutaneous three times a day before meals  ketorolac   Injectable 15 milliGRAM(s) IV Push every 8 hours  labetalol 100 milliGRAM(s) Oral every 24 hours  losartan 50 milliGRAM(s) Oral daily  montelukast 10 milliGRAM(s) Oral every 24 hours  oxybutynin 5 milliGRAM(s) Oral every 8 hours    MEDICATIONS  (PRN):  albuterol/ipratropium for Nebulization 3 milliLiter(s) Nebulizer every 6 hours PRN Shortness of Breath and/or Wheezing  benzocaine/menthol Lozenge 1 Lozenge Oral every 8 hours PRN Sore Throat  benzonatate 100 milliGRAM(s) Oral every 8 hours PRN Cough  dextrose Oral Gel 15 Gram(s) Oral once PRN Blood Glucose LESS THAN 70 milliGRAM(s)/deciliter  oxyCODONE    IR 10 milliGRAM(s) Oral every 4 hours PRN Moderate Pain (4 - 6)  oxyCODONE    IR 20 milliGRAM(s) Oral every 4 hours PRN Severe Pain (7 - 10)

## 2023-03-24 NOTE — PROGRESS NOTE ADULT - PROBLEM SELECTOR PLAN 6
Patient w abdominal pain 2/2 to necrotizing fasc,   on oxycodone 5mg q 4 currently, but is still requiring dilaudid 1mg IV break through pushes  Plan:  - C/w dilaudid 1mg q4 PRN for now  - Will change to home regimen of oxycodone 10mg and 20mg alternating  - iSTOP

## 2023-03-24 NOTE — PROGRESS NOTE ADULT - PROBLEM SELECTOR PLAN 1
2/2 complicated urinary tract infection due to suprapubic catheter   SIRS Positive for tachycardia and WBC +lactic acidosis >5 with suspected source of infection   Plan:  - c/w gentamycin, s/p 1 dose 3/22 PM, f/u gentamycin level this AM-> 5.0, dosing q36h confirmed w clinical pharmacist-> second dose today 3/24/22 11AM (no further doses needed)  - urine cx growing klebsiella and enterococcus faecalis, Enterococcus susceptible to ampicillin, Klebsiella MDR->S to gentamycin  - s/p SPC catheter removal.

## 2023-03-24 NOTE — PROGRESS NOTE ADULT - PROBLEM SELECTOR PLAN 4
A1c of 10 on previous admission   Home med: Admelog 35units premeals and Humulin 160u TID   Hyperglycemic oh admission with FS>500, downtrended after fluids and Insulin s/q   Elevated 3/23 ON and this AM-> likely 2/2 to solucortef premedication prior to procedure  Plan:  - A1c 12.5   - No acidosis or AG  w elevated FSG y/day night and this AM  - Endocrinology consulted; recommending custom ISS  - Lantus 60 U.  - Admelog 30 units pre-meals   - NPH 15U given this AM as per endo. Custom sliding scale.  - F/u recs.

## 2023-03-24 NOTE — CHART NOTE - NSCHARTNOTEFT_GEN_A_CORE
Admitting Diagnosis:   Patient is a 43y old  Female who presents with a chief complaint of Urosepsis (24 Mar 2023 09:56)  PAST MEDICAL & SURGICAL HISTORY:  Essential hypertension  Hyperlipidemia  Diabetes  Obesity  Abdominal hernia  Pre-eclampsia  H/O LEEP  History of D&amp;C  H/O:   H/O ventral hernia repair  H/O exploratory laparotomy    Current Nutrition Order: Consistent Carbohydrate diet with no snacks      PO Intake: Good (%) [   ]  Fair (50-75%) [ x ] Poor (<25%) [   ]    GI Issues: intermittent s/s of nausea, no vomiting, BM today, colostomy with 2mL output over the past 24h, abdominal pain noted;     Pain: abdominal pain noted by pt; pain level was severe this morning (level 8)    Skin Integrity: within normal limits at this time; no edema, no pressure ulcers noted; Joseph: 20     Labs:   -    131<L>  |  98  |  20  ----------------------------<  497<HH>  5.4<H>   |  19<L>  |  0.69    Ca    9.5      24 Mar 2023 07:55  Phos  2.7     -24  Mg     2.1     -24    TPro  7.5  /  Alb  3.6  /  TBili  0.2  /  DBili  x   /  AST  14  /  ALT  12  /  AlkPhos  139<H>  03-24    CAPILLARY BLOOD GLUCOSE    POCT Blood Glucose.: 264 mg/dL (24 Mar 2023 11:10)  POCT Blood Glucose.: 488 mg/dL (24 Mar 2023 07:38)  POCT Blood Glucose.: 435 mg/dL (24 Mar 2023 06:44)  POCT Blood Glucose.: 474 mg/dL (24 Mar 2023 04:08)  POCT Blood Glucose.: 476 mg/dL (24 Mar 2023 01:37)  POCT Blood Glucose.: 533 mg/dL (24 Mar 2023 00:17)  POCT Blood Glucose.: 528 mg/dL (23 Mar 2023 23:29)  POCT Blood Glucose.: 551 mg/dL (23 Mar 2023 23:18)  POCT Blood Glucose.: 228 mg/dL (23 Mar 2023 17:30)    Medications:  MEDICATIONS  (STANDING):  acetaminophen     Tablet .. 975 milliGRAM(s) Oral every 8 hours  dextrose 5%. 1000 milliLiter(s) (100 mL/Hr) IV Continuous <Continuous>  dextrose 5%. 1000 milliLiter(s) (50 mL/Hr) IV Continuous <Continuous>  dextrose 50% Injectable 25 Gram(s) IV Push once  dextrose 50% Injectable 12.5 Gram(s) IV Push once  dextrose 50% Injectable 25 Gram(s) IV Push once  glucagon  Injectable 1 milliGRAM(s) IntraMuscular once  insulin glargine Injectable (LANTUS) 10 Unit(s) SubCutaneous every morning  insulin glargine Injectable (LANTUS) 60 Unit(s) SubCutaneous at bedtime  insulin lispro (ADMELOG) corrective regimen sliding scale   SubCutaneous Before meals and at bedtime  insulin lispro Injectable (ADMELOG) 30 Unit(s) SubCutaneous three times a day before meals  ketorolac   Injectable 15 milliGRAM(s) IV Push every 8 hours  labetalol 100 milliGRAM(s) Oral every 24 hours  losartan 50 milliGRAM(s) Oral daily  montelukast 10 milliGRAM(s) Oral every 24 hours  oxybutynin 5 milliGRAM(s) Oral every 8 hours    MEDICATIONS  (PRN):  albuterol/ipratropium for Nebulization 3 milliLiter(s) Nebulizer every 6 hours PRN Shortness of Breath and/or Wheezing  benzocaine/menthol Lozenge 1 Lozenge Oral every 8 hours PRN Sore Throat  benzonatate 100 milliGRAM(s) Oral every 8 hours PRN Cough  dextrose Oral Gel 15 Gram(s) Oral once PRN Blood Glucose LESS THAN 70 milliGRAM(s)/deciliter  oxyCODONE    IR 10 milliGRAM(s) Oral every 4 hours PRN Moderate Pain (4 - 6)  oxyCODONE    IR 20 milliGRAM(s) Oral every 4 hours PRN Severe Pain (7 - 10)    Anthropometrics:   Height for BMI (FEET)	5 Feet  Height for BMI (INCHES)	7 Inch(s)  Height for BMI (CM)	170.18 Centimeter(s)  Weight for BMI (lbs)	220 lb  Weight for BMI (kg)	99.8 kg  Body Mass Index	              34.4    Weight Change: no new weights noted; recommend that nursing obtain updated weights; RD to continue to trend.     Nutrition Focused Physical Exam: Completed [   ]  Not Pertinent [ x ]    Estimated energy needs:   Weight used for calculations	IBW  Estimated Energy Needs Weight (lbs)	135 lb  Estimated Energy Needs Weight (kg)	61.2 kg  Estimated Energy Needs From (janice/kg)	27  Estimated Energy Needs To (janice/kg)	32  Estimated Energy Needs Calculated From (janice/kg)	1652  Estimated Energy Needs Calculated To (janice/kg)	8  Weight used for calculations	IBW  Estimated Protein Needs Weight (lbs)	135 lb  Estimated Protein Needs Weight (kg)	61.2 kg  Estimated Protein Needs From (g/kg)	1.2  Estimated Protein Needs To (g/kg)	1.3  Estimated Protein Needs Calculated From (g/kg)	73.44  Estimated Protein Needs Calculated To (g/kg)	79.56  Estimated Fluid Needs Weight (lbs)	135 lb  Estimated Fluid Needs Weight (kg)	61.2 kg  Estimated Fluid Needs From (ml/kg)	30  Estimated Fluid Needs Calculated From (ml/kg)	1836  Other Calculations	Based on Standards of Care pt >% IBW (163% IBW), thus ideal body weight used for all calculations. Needs adjusted based on age, clinical status.    Subjective: 43y yo Female  with PMH of HTN, HLD, Abdominal infection c/b Necrotizing fascitis (s/p suprapubic catheter and ostomy bag placement on 21 at [facility]) , who presents to the ED with concerns of fatigue and episodes of night sweats for last few weeks. Patient is being admitted to [facility] for further management of severe sepsis and Ostomy care.     RD visited pt at bedside for nutrition follow up evaluation. Pt endorsed her appetite is still low, noted with fair intakes overall ~50%, sometimes less, over the past several days. Pt endorsed she ate yogurt, eggs, milk (~50%) this morning for breakfast. Pt still has c/p intermittent s/s of nausea, RN is aware; colostomy with 2mL output over the past 24h, abdominal pain noted. Pt endorsed her pain is still elevated, RN is aware. Labs reviewed: elevated POCT BG: this morning it was 533, then 476, 474, 435, 488, and 264 is the most recent value; pt's RN stated a new insulin was to be utilized for this pt; also of note, low Na (131), elevated K (5.4), elevated ALP (139); RD to continue to monitor trends. RD discussed pt's status with medical team. RD to remain available per protocol. Additional nutrition recommendations below to follow.     Previous Nutrition Diagnosis:  1. Increased Nutrient Needs r/t hypermetabolism secondary to pt's condition AEB sepsis and ostomy care  2. Inadequate Energy Intake r/t inability to meet nutrient needs secondary to pt's condition AEB nausea and vomiting for the past ~1 week PTA and during admission    *diagnosis 1 is still Active [ x ]*  Resolved [   ]    If resolved, new PES:    Goal: Pt to consistently meet at least 75% of EEE via tolerated route that is consistent with GOC during hospital stay;     Recommendations:  1. Continue with current diet order  >>Recommend Ensure Max Protein ONS BID for additional nutrients (150kcal, 30gPRO per serving)  2. Encourage pt to meet nutritional needs as able  3. Monitor PO intakes, trend weights (weekly), monitor skin integrity, monitor labs (electrolytes, CMP), monitor GI fxn   4. Encourage adherence to diet education (reinforce as able)   5. Continue insulin regimen to promote euglycemia  6. Pain and bowel regimen per team  7. Will continue to assess/honor preferences as able   8. Align nutrition interventions with GOC at all times    Education: RD encouraged pt to continue to maintain PO intakes as able, increase protein intakes as able, asked pt about Ensure Max Protein ONS to try 1-2 times per day r/t pt's 50% or less PO intakes since admission; pt was amenable.     Risk Level: High [ x ] Moderate [   ] Low [   ]

## 2023-03-24 NOTE — PROGRESS NOTE ADULT - PROBLEM SELECTOR PLAN 3
I-STOP reviewed; cont. oxycodone q4-6h PRN (10mg if moderate, 20mg if severe); can give IV Dilaudid if needed for breakthrough pain; will consult Pain Mgmt if pain uncontrolled

## 2023-03-24 NOTE — PROVIDER CONTACT NOTE (CRITICAL VALUE NOTIFICATION) - ACTION/TREATMENT ORDERED:
No interventions, MD notified.
Tia Flanagan informed RN to recheck blood glucose now  POCT blood glucose was 476. Tia Flanagan aware of result no further intervention at this time.

## 2023-03-24 NOTE — PROGRESS NOTE ADULT - PROBLEM SELECTOR PLAN 2
HbA1c 12.5%, not at goal; today's hyperglycemia is likely due to steroids, which were given prior to IR procedure, as premedication for contrast allergy; cont. insulin regimen per Endocrine recs, monitor blood glucose and anion gap; cont. diabetic diet

## 2023-03-24 NOTE — PROGRESS NOTE ADULT - PROBLEM SELECTOR PLAN 1
- Please give Lantus 60 units at bedtime and Lantus 10 units in the AM.   - Continue lispro  to 30 units before each meal.  - Continue Lispro high dose insulin correctional scale moderate scale until steroid effect wears off, and then change back to MISS.  - Patient's fingerstick glucose goal is 100-180 mg/dL.    - Patient can follow up at discharge with Dr. Joseph at [facility] by calling [phone] to make an appointment.      Case discussed with Dr. Bowden. Primary team updated.

## 2023-03-24 NOTE — PROGRESS NOTE ADULT - SUBJECTIVE AND OBJECTIVE BOX
Patient is a 43y old  Female who presents with a chief complaint of Urosepsis (24 Mar 2023 09:56)      INTERVAL HPI/OVERNIGHT EVENTS:    Pt. seen and examined earlier today  Pt. c/o generalized itching s/p IR procedure, controlled w/ Benadryl  Pt. c/o chronic pain, usually controlled w/ opioids, but suboptimally controlled today  Pt. denies F/C, CP, SOB, wheezing, throat swelling, hives    Review of Systems: 12 point review of systems otherwise negative    MEDICATIONS  (STANDING):  acetaminophen     Tablet .. 975 milliGRAM(s) Oral every 8 hours  dextrose 5%. 1000 milliLiter(s) (100 mL/Hr) IV Continuous <Continuous>  dextrose 5%. 1000 milliLiter(s) (50 mL/Hr) IV Continuous <Continuous>  dextrose 50% Injectable 25 Gram(s) IV Push once  dextrose 50% Injectable 12.5 Gram(s) IV Push once  dextrose 50% Injectable 25 Gram(s) IV Push once  glucagon  Injectable 1 milliGRAM(s) IntraMuscular once  insulin glargine Injectable (LANTUS) 10 Unit(s) SubCutaneous every morning  insulin glargine Injectable (LANTUS) 60 Unit(s) SubCutaneous at bedtime  insulin lispro (ADMELOG) corrective regimen sliding scale   SubCutaneous Before meals and at bedtime  insulin lispro Injectable (ADMELOG) 30 Unit(s) SubCutaneous three times a day before meals  ketorolac   Injectable 15 milliGRAM(s) IV Push every 8 hours  labetalol 100 milliGRAM(s) Oral every 24 hours  losartan 50 milliGRAM(s) Oral daily  montelukast 10 milliGRAM(s) Oral every 24 hours  oxybutynin 5 milliGRAM(s) Oral every 8 hours    MEDICATIONS  (PRN):  albuterol/ipratropium for Nebulization 3 milliLiter(s) Nebulizer every 6 hours PRN Shortness of Breath and/or Wheezing  benzocaine/menthol Lozenge 1 Lozenge Oral every 8 hours PRN Sore Throat  benzonatate 100 milliGRAM(s) Oral every 8 hours PRN Cough  dextrose Oral Gel 15 Gram(s) Oral once PRN Blood Glucose LESS THAN 70 milliGRAM(s)/deciliter  oxyCODONE    IR 10 milliGRAM(s) Oral every 4 hours PRN Moderate Pain (4 - 6)  oxyCODONE    IR 20 milliGRAM(s) Oral every 4 hours PRN Severe Pain (7 - 10)      Allergies    amoxicillin (Unknown)  clindamycin (Pruritus)  fish (Hives; Urticaria)  iodine containing compounds (Hives; Anaphylaxis)  penicillin (Hives)  shellfish. (Hives; Anaphylaxis)    Intolerances    Bactrim I.V. (Rash (Mod to Severe))        Vital Signs Last 24 Hrs  T(C): 36.8 (24 Mar 2023 08:25), Max: 37.1 (23 Mar 2023 20:15)  T(F): 98.3 (24 Mar 2023 08:25), Max: 98.7 (23 Mar 2023 20:15)  HR: 93 (24 Mar 2023 08:25) (93 - 104)  BP: 157/93 (24 Mar 2023 08:25) (148/85 - 157/93)  BP(mean): 107 (24 Mar 2023 05:00) (107 - 107)  RR: 17 (24 Mar 2023 08:25) (17 - 17)  SpO2: 97% (24 Mar 2023 08:25) (96% - 97%)    Parameters below as of 24 Mar 2023 08:25  Patient On (Oxygen Delivery Method): room air      CAPILLARY BLOOD GLUCOSE      POCT Blood Glucose.: 264 mg/dL (24 Mar 2023 11:10)  POCT Blood Glucose.: 488 mg/dL (24 Mar 2023 07:38)  POCT Blood Glucose.: 435 mg/dL (24 Mar 2023 06:44)  POCT Blood Glucose.: 474 mg/dL (24 Mar 2023 04:08)  POCT Blood Glucose.: 476 mg/dL (24 Mar 2023 01:37)  POCT Blood Glucose.: 533 mg/dL (24 Mar 2023 00:17)  POCT Blood Glucose.: 528 mg/dL (23 Mar 2023 23:29)  POCT Blood Glucose.: 551 mg/dL (23 Mar 2023 23:18)  POCT Blood Glucose.: 228 mg/dL (23 Mar 2023 17:30)      03-23 @ 07:01  -  03-24 @ 07:00  --------------------------------------------------------  IN: 0 mL / OUT: 2002 mL / NET: -2002 mL    03-24 @ 07:01  -  03-24 @ 14:40  --------------------------------------------------------  IN: 100 mL / OUT: 300 mL / NET: -200 mL        Physical Exam:  (earlier today)  Daily     Daily   General:  non-toxic appearing in NAD  HEENT:  MMM  CV:  RRR  Lungs:  no wheezing/stridor  Abdomen:  soft NT ND +ostomy  Extremities:  no edema B/L LE  Skin:  WWP, no hives  :  +SPT  Neuro:  AAOx3    LABS:                        12.5   11.45 )-----------( 479      ( 24 Mar 2023 07:55 )             41.6     03-24    131<L>  |  98  |  20  ----------------------------<  497<HH>  5.4<H>   |  19<L>  |  0.69    Ca    9.5      24 Mar 2023 07:55  Phos  2.7     03-24  Mg     2.1     03-24    TPro  7.5  /  Alb  3.6  /  TBili  0.2  /  DBili  x   /  AST  14  /  ALT  12  /  AlkPhos  139<H>  03-24

## 2023-03-24 NOTE — PROGRESS NOTE ADULT - PROBLEM SELECTOR PLAN 1
severe sepsis POA, due to SPT POA, due to MDR Klebsiella and E. faecalis; ID and Urology following; clinically improving; cont. gent, per ID recs, dose per level; s/p IR SPT exchange and upsize 3/23; cont. contact precautions

## 2023-03-24 NOTE — PROGRESS NOTE ADULT - SUBJECTIVE AND OBJECTIVE BOX
SUBJECTIVE: Seen and evaluated at bedside in am. Had elevated FSG overnight and received 20U sliding scale coverage. Denies any LUTS.       MEDICATIONS  (STANDING):  acetaminophen     Tablet .. 975 milliGRAM(s) Oral every 8 hours  dextrose 5%. 1000 milliLiter(s) (100 mL/Hr) IV Continuous <Continuous>  dextrose 5%. 1000 milliLiter(s) (50 mL/Hr) IV Continuous <Continuous>  dextrose 50% Injectable 25 Gram(s) IV Push once  dextrose 50% Injectable 12.5 Gram(s) IV Push once  dextrose 50% Injectable 25 Gram(s) IV Push once  enoxaparin Injectable 40 milliGRAM(s) SubCutaneous every 24 hours  glucagon  Injectable 1 milliGRAM(s) IntraMuscular once  insulin glargine Injectable (LANTUS) 10 Unit(s) SubCutaneous every morning  insulin glargine Injectable (LANTUS) 60 Unit(s) SubCutaneous at bedtime  insulin lispro (ADMELOG) corrective regimen sliding scale   SubCutaneous Before meals and at bedtime  insulin lispro Injectable (ADMELOG) 30 Unit(s) SubCutaneous three times a day before meals  ketorolac   Injectable 15 milliGRAM(s) IV Push every 8 hours  labetalol 100 milliGRAM(s) Oral every 24 hours  losartan 50 milliGRAM(s) Oral daily  montelukast 10 milliGRAM(s) Oral every 24 hours  oxybutynin 5 milliGRAM(s) Oral every 8 hours    MEDICATIONS  (PRN):  albuterol/ipratropium for Nebulization 3 milliLiter(s) Nebulizer every 6 hours PRN Shortness of Breath and/or Wheezing  benzocaine/menthol Lozenge 1 Lozenge Oral every 8 hours PRN Sore Throat  benzonatate 100 milliGRAM(s) Oral every 8 hours PRN Cough  dextrose Oral Gel 15 Gram(s) Oral once PRN Blood Glucose LESS THAN 70 milliGRAM(s)/deciliter  oxyCODONE    IR 10 milliGRAM(s) Oral every 4 hours PRN Moderate Pain (4 - 6)  oxyCODONE    IR 20 milliGRAM(s) Oral every 4 hours PRN Severe Pain (7 - 10)      Vital Signs Last 24 Hrs  T(C): 36.7 (24 Mar 2023 17:14), Max: 37.1 (23 Mar 2023 20:15)  T(F): 98 (24 Mar 2023 17:14), Max: 98.7 (23 Mar 2023 20:15)  HR: 100 (24 Mar 2023 17:14) (93 - 104)  BP: 144/72 (24 Mar 2023 17:14) (144/72 - 157/93)  BP(mean): 107 (24 Mar 2023 05:00) (107 - 107)  RR: 17 (24 Mar 2023 17:14) (17 - 17)  SpO2: 98% (24 Mar 2023 17:14) (96% - 98%)    Parameters below as of 24 Mar 2023 17:14  Patient On (Oxygen Delivery Method): room air        Physical Exam:  General: NAD, resting comfortably in bed  Pulmonary: Nonlabored breathing, no respiratory distress  Cardiovascular: NSR  Abdominal: soft, NT/ND  : suprapubic place draining clear yellow urine, primafit in place draining clear yellow urine     I&O's Summary    23 Mar 2023 07:01  -  24 Mar 2023 07:00  --------------------------------------------------------  IN: 0 mL / OUT: 2002 mL / NET: -2002 mL    24 Mar 2023 07:01  -  24 Mar 2023 18:03  --------------------------------------------------------  IN: 100 mL / OUT: 550 mL / NET: -450 mL        LABS:                        12.5   11.45 )-----------( 479      ( 24 Mar 2023 07:55 )             41.6     03-24    134<L>  |  100  |  24<H>  ----------------------------<  292<H>  4.7   |  21<L>  |  0.75    Ca    9.4      24 Mar 2023 15:23  Phos  2.7     03-24  Mg     2.1     03-24    TPro  7.5  /  Alb  3.6  /  TBili  0.2  /  DBili  x   /  AST  14  /  ALT  12  /  AlkPhos  139<H>  03-24        CAPILLARY BLOOD GLUCOSE      POCT Blood Glucose.: 352 mg/dL (24 Mar 2023 16:51)  POCT Blood Glucose.: 264 mg/dL (24 Mar 2023 11:10)  POCT Blood Glucose.: 488 mg/dL (24 Mar 2023 07:38)  POCT Blood Glucose.: 435 mg/dL (24 Mar 2023 06:44)  POCT Blood Glucose.: 474 mg/dL (24 Mar 2023 04:08)  POCT Blood Glucose.: 476 mg/dL (24 Mar 2023 01:37)  POCT Blood Glucose.: 533 mg/dL (24 Mar 2023 00:17)  POCT Blood Glucose.: 528 mg/dL (23 Mar 2023 23:29)  POCT Blood Glucose.: 551 mg/dL (23 Mar 2023 23:18)    LIVER FUNCTIONS - ( 24 Mar 2023 07:55 )  Alb: 3.6 g/dL / Pro: 7.5 g/dL / ALK PHOS: 139 U/L / ALT: 12 U/L / AST: 14 U/L / GGT: x             RADIOLOGY & ADDITIONAL STUDIES:

## 2023-03-25 LAB
ALBUMIN SERPL ELPH-MCNC: 3.3 G/DL — SIGNIFICANT CHANGE UP (ref 3.3–5)
ALP SERPL-CCNC: 114 U/L — SIGNIFICANT CHANGE UP (ref 40–120)
ALT FLD-CCNC: 10 U/L — SIGNIFICANT CHANGE UP (ref 10–45)
ANION GAP SERPL CALC-SCNC: 13 MMOL/L — SIGNIFICANT CHANGE UP (ref 5–17)
ANISOCYTOSIS BLD QL: SLIGHT — SIGNIFICANT CHANGE UP
AST SERPL-CCNC: 12 U/L — SIGNIFICANT CHANGE UP (ref 10–40)
BASOPHILS # BLD AUTO: 0 K/UL — SIGNIFICANT CHANGE UP (ref 0–0.2)
BASOPHILS NFR BLD AUTO: 0 % — SIGNIFICANT CHANGE UP (ref 0–2)
BILIRUB SERPL-MCNC: <0.2 MG/DL — SIGNIFICANT CHANGE UP (ref 0.2–1.2)
BUN SERPL-MCNC: 27 MG/DL — HIGH (ref 7–23)
BURR CELLS BLD QL SMEAR: PRESENT — SIGNIFICANT CHANGE UP
CALCIUM SERPL-MCNC: 9.1 MG/DL — SIGNIFICANT CHANGE UP (ref 8.4–10.5)
CHLORIDE SERPL-SCNC: 100 MMOL/L — SIGNIFICANT CHANGE UP (ref 96–108)
CO2 SERPL-SCNC: 21 MMOL/L — LOW (ref 22–31)
CREAT SERPL-MCNC: 0.81 MG/DL — SIGNIFICANT CHANGE UP (ref 0.5–1.3)
DACRYOCYTES BLD QL SMEAR: SLIGHT — SIGNIFICANT CHANGE UP
EGFR: 92 ML/MIN/1.73M2 — SIGNIFICANT CHANGE UP
EOSINOPHIL # BLD AUTO: 0 K/UL — SIGNIFICANT CHANGE UP (ref 0–0.5)
EOSINOPHIL NFR BLD AUTO: 0 % — SIGNIFICANT CHANGE UP (ref 0–6)
GLUCOSE BLDC GLUCOMTR-MCNC: 133 MG/DL — HIGH (ref 70–99)
GLUCOSE BLDC GLUCOMTR-MCNC: 260 MG/DL — HIGH (ref 70–99)
GLUCOSE BLDC GLUCOMTR-MCNC: 263 MG/DL — HIGH (ref 70–99)
GLUCOSE BLDC GLUCOMTR-MCNC: 319 MG/DL — HIGH (ref 70–99)
GLUCOSE SERPL-MCNC: 282 MG/DL — HIGH (ref 70–99)
HCT VFR BLD CALC: 38.7 % — SIGNIFICANT CHANGE UP (ref 34.5–45)
HGB BLD-MCNC: 11.8 G/DL — SIGNIFICANT CHANGE UP (ref 11.5–15.5)
LYMPHOCYTES # BLD AUTO: 40 % — SIGNIFICANT CHANGE UP (ref 13–44)
LYMPHOCYTES # BLD AUTO: 5.12 K/UL — HIGH (ref 1–3.3)
MACROCYTES BLD QL: SLIGHT — SIGNIFICANT CHANGE UP
MAGNESIUM SERPL-MCNC: 1.7 MG/DL — SIGNIFICANT CHANGE UP (ref 1.6–2.6)
MANUAL SMEAR VERIFICATION: SIGNIFICANT CHANGE UP
MCHC RBC-ENTMCNC: 24.7 PG — LOW (ref 27–34)
MCHC RBC-ENTMCNC: 30.5 GM/DL — LOW (ref 32–36)
MCV RBC AUTO: 81 FL — SIGNIFICANT CHANGE UP (ref 80–100)
MICROCYTES BLD QL: SLIGHT — SIGNIFICANT CHANGE UP
MONOCYTES # BLD AUTO: 0.23 K/UL — SIGNIFICANT CHANGE UP (ref 0–0.9)
MONOCYTES NFR BLD AUTO: 1.8 % — LOW (ref 2–14)
NEUTROPHILS # BLD AUTO: 7.23 K/UL — SIGNIFICANT CHANGE UP (ref 1.8–7.4)
NEUTROPHILS NFR BLD AUTO: 56.5 % — SIGNIFICANT CHANGE UP (ref 43–77)
OVALOCYTES BLD QL SMEAR: SLIGHT — SIGNIFICANT CHANGE UP
PHOSPHATE SERPL-MCNC: 3.8 MG/DL — SIGNIFICANT CHANGE UP (ref 2.5–4.5)
PLAT MORPH BLD: NORMAL — SIGNIFICANT CHANGE UP
PLATELET # BLD AUTO: 396 K/UL — SIGNIFICANT CHANGE UP (ref 150–400)
POIKILOCYTOSIS BLD QL AUTO: SLIGHT — SIGNIFICANT CHANGE UP
POLYCHROMASIA BLD QL SMEAR: SLIGHT — SIGNIFICANT CHANGE UP
POTASSIUM SERPL-MCNC: 4.3 MMOL/L — SIGNIFICANT CHANGE UP (ref 3.5–5.3)
POTASSIUM SERPL-SCNC: 4.3 MMOL/L — SIGNIFICANT CHANGE UP (ref 3.5–5.3)
PROT SERPL-MCNC: 6.8 G/DL — SIGNIFICANT CHANGE UP (ref 6–8.3)
RBC # BLD: 4.78 M/UL — SIGNIFICANT CHANGE UP (ref 3.8–5.2)
RBC # FLD: 15.1 % — HIGH (ref 10.3–14.5)
RBC BLD AUTO: ABNORMAL
SODIUM SERPL-SCNC: 134 MMOL/L — LOW (ref 135–145)
SPHEROCYTES BLD QL SMEAR: SLIGHT — SIGNIFICANT CHANGE UP
VARIANT LYMPHS # BLD: 1.7 % — SIGNIFICANT CHANGE UP (ref 0–6)
WBC # BLD: 12.8 K/UL — HIGH (ref 3.8–10.5)
WBC # FLD AUTO: 12.8 K/UL — HIGH (ref 3.8–10.5)

## 2023-03-25 PROCEDURE — 99232 SBSQ HOSP IP/OBS MODERATE 35: CPT | Mod: GC

## 2023-03-25 PROCEDURE — 99232 SBSQ HOSP IP/OBS MODERATE 35: CPT

## 2023-03-25 RX ORDER — INSULIN GLARGINE 100 [IU]/ML
30 INJECTION, SOLUTION SUBCUTANEOUS EVERY MORNING
Refills: 0 | Status: DISCONTINUED | OUTPATIENT
Start: 2023-03-25 | End: 2023-03-27

## 2023-03-25 RX ORDER — ONDANSETRON 8 MG/1
4 TABLET, FILM COATED ORAL ONCE
Refills: 0 | Status: DISCONTINUED | OUTPATIENT
Start: 2023-03-25 | End: 2023-03-25

## 2023-03-25 RX ORDER — INSULIN LISPRO 100/ML
36 VIAL (ML) SUBCUTANEOUS
Refills: 0 | Status: DISCONTINUED | OUTPATIENT
Start: 2023-03-25 | End: 2023-03-27

## 2023-03-25 RX ORDER — DEXAMETHASONE 0.5 MG/5ML
1 ELIXIR ORAL ONCE
Refills: 0 | Status: COMPLETED | OUTPATIENT
Start: 2023-03-26 | End: 2023-03-26

## 2023-03-25 RX ORDER — MAGNESIUM SULFATE 500 MG/ML
2 VIAL (ML) INJECTION ONCE
Refills: 0 | Status: COMPLETED | OUTPATIENT
Start: 2023-03-25 | End: 2023-03-25

## 2023-03-25 RX ORDER — ONDANSETRON 8 MG/1
4 TABLET, FILM COATED ORAL ONCE
Refills: 0 | Status: COMPLETED | OUTPATIENT
Start: 2023-03-25 | End: 2023-03-25

## 2023-03-25 RX ORDER — DIPHENHYDRAMINE HCL 50 MG
25 CAPSULE ORAL ONCE
Refills: 0 | Status: COMPLETED | OUTPATIENT
Start: 2023-03-25 | End: 2023-03-25

## 2023-03-25 RX ORDER — DIPHENHYDRAMINE HCL 50 MG
50 CAPSULE ORAL ONCE
Refills: 0 | Status: COMPLETED | OUTPATIENT
Start: 2023-03-25 | End: 2023-03-25

## 2023-03-25 RX ADMIN — MONTELUKAST 10 MILLIGRAM(S): 4 TABLET, CHEWABLE ORAL at 18:06

## 2023-03-25 RX ADMIN — Medication 100 MILLIGRAM(S): at 11:15

## 2023-03-25 RX ADMIN — OXYCODONE HYDROCHLORIDE 20 MILLIGRAM(S): 5 TABLET ORAL at 23:42

## 2023-03-25 RX ADMIN — OXYCODONE HYDROCHLORIDE 20 MILLIGRAM(S): 5 TABLET ORAL at 12:31

## 2023-03-25 RX ADMIN — Medication 25 GRAM(S): at 15:06

## 2023-03-25 RX ADMIN — Medication 25 MILLIGRAM(S): at 11:15

## 2023-03-25 RX ADMIN — OXYCODONE HYDROCHLORIDE 20 MILLIGRAM(S): 5 TABLET ORAL at 18:07

## 2023-03-25 RX ADMIN — OXYCODONE HYDROCHLORIDE 10 MILLIGRAM(S): 5 TABLET ORAL at 00:11

## 2023-03-25 RX ADMIN — ENOXAPARIN SODIUM 40 MILLIGRAM(S): 100 INJECTION SUBCUTANEOUS at 21:17

## 2023-03-25 RX ADMIN — Medication 15 MILLIGRAM(S): at 00:10

## 2023-03-25 RX ADMIN — Medication 36 UNIT(S): at 18:06

## 2023-03-25 RX ADMIN — INSULIN GLARGINE 60 UNIT(S): 100 INJECTION, SOLUTION SUBCUTANEOUS at 22:30

## 2023-03-25 RX ADMIN — Medication 12: at 09:26

## 2023-03-25 RX ADMIN — Medication 15 MILLIGRAM(S): at 07:31

## 2023-03-25 RX ADMIN — OXYCODONE HYDROCHLORIDE 20 MILLIGRAM(S): 5 TABLET ORAL at 11:31

## 2023-03-25 RX ADMIN — Medication 15: at 22:31

## 2023-03-25 RX ADMIN — Medication 5 MILLIGRAM(S): at 06:50

## 2023-03-25 RX ADMIN — LOSARTAN POTASSIUM 50 MILLIGRAM(S): 100 TABLET, FILM COATED ORAL at 06:50

## 2023-03-25 RX ADMIN — OXYCODONE HYDROCHLORIDE 20 MILLIGRAM(S): 5 TABLET ORAL at 07:52

## 2023-03-25 RX ADMIN — OXYCODONE HYDROCHLORIDE 20 MILLIGRAM(S): 5 TABLET ORAL at 02:15

## 2023-03-25 RX ADMIN — Medication 50 MILLIGRAM(S): at 21:26

## 2023-03-25 RX ADMIN — Medication 30 UNIT(S): at 09:26

## 2023-03-25 RX ADMIN — Medication 12: at 18:06

## 2023-03-25 RX ADMIN — Medication 5 MILLIGRAM(S): at 18:06

## 2023-03-25 RX ADMIN — ONDANSETRON 4 MILLIGRAM(S): 8 TABLET, FILM COATED ORAL at 18:06

## 2023-03-25 RX ADMIN — Medication 25 MILLIGRAM(S): at 09:44

## 2023-03-25 RX ADMIN — Medication 15 MILLIGRAM(S): at 15:05

## 2023-03-25 NOTE — PROGRESS NOTE ADULT - SUBJECTIVE AND OBJECTIVE BOX
Patient is awake, eating breakfast, doing well. No acute events overnight  Endocrine following for diabetes    MEDICATIONS  (STANDING):  acetaminophen     Tablet .. 975 milliGRAM(s) Oral every 8 hours  dextrose 5%. 1000 milliLiter(s) (50 mL/Hr) IV Continuous <Continuous>  dextrose 5%. 1000 milliLiter(s) (100 mL/Hr) IV Continuous <Continuous>  dextrose 50% Injectable 25 Gram(s) IV Push once  dextrose 50% Injectable 12.5 Gram(s) IV Push once  dextrose 50% Injectable 25 Gram(s) IV Push once  diphenhydrAMINE 25 milliGRAM(s) Oral once  enoxaparin Injectable 40 milliGRAM(s) SubCutaneous every 24 hours  glucagon  Injectable 1 milliGRAM(s) IntraMuscular once  insulin glargine Injectable (LANTUS) 60 Unit(s) SubCutaneous at bedtime  insulin glargine Injectable (LANTUS) 10 Unit(s) SubCutaneous every morning  insulin lispro (ADMELOG) corrective regimen sliding scale   SubCutaneous Before meals and at bedtime  insulin lispro Injectable (ADMELOG) 30 Unit(s) SubCutaneous three times a day before meals  ketorolac   Injectable 15 milliGRAM(s) IV Push every 8 hours  labetalol 100 milliGRAM(s) Oral every 24 hours  losartan 50 milliGRAM(s) Oral daily  magnesium sulfate  IVPB 2 Gram(s) IV Intermittent once  montelukast 10 milliGRAM(s) Oral every 24 hours  oxybutynin 5 milliGRAM(s) Oral every 8 hours    MEDICATIONS  (PRN):  albuterol/ipratropium for Nebulization 3 milliLiter(s) Nebulizer every 6 hours PRN Shortness of Breath and/or Wheezing  benzocaine/menthol Lozenge 1 Lozenge Oral every 8 hours PRN Sore Throat  benzonatate 100 milliGRAM(s) Oral every 8 hours PRN Cough  dextrose Oral Gel 15 Gram(s) Oral once PRN Blood Glucose LESS THAN 70 milliGRAM(s)/deciliter  oxyCODONE    IR 10 milliGRAM(s) Oral every 4 hours PRN Moderate Pain (4 - 6)  oxyCODONE    IR 20 milliGRAM(s) Oral every 4 hours PRN Severe Pain (7 - 10)      Allergies  amoxicillin (Unknown)  clindamycin (Pruritus)  fish (Hives; Urticaria)  iodine containing compounds (Hives; Anaphylaxis)  penicillin (Hives)  shellfish. (Hives; Anaphylaxis)    Intolerances  Bactrim I.V. (Rash (Mod to Severe))    Review of Systems:  Constitutional: No fever  Eyes: No blurry vision  Neuro: No tremors  HEENT: No pain  Cardiovascular: No chest pain, palpitations  Respiratory: No SOB, no cough  GI: No nausea, vomiting, abdominal pain  : No dysuria  Skin: no rash  Psych: no depression  ALL OTHER SYSTEMS REVIEWED AND NEGATIVE    PHYSICAL EXAM:  VITALS: T(C): 37.3 (03-24-23 @ 20:47)  T(F): 99.1 (03-24-23 @ 20:47), Max: 99.1 (03-24-23 @ 20:47)  HR: 97 (03-24-23 @ 20:47) (97 - 100)  BP: 113/62 (03-24-23 @ 20:47) (113/62 - 144/72)  RR:  (17 - 18)  SpO2:  (98% - 99%)  Wt(kg): --  GENERAL: NAD, well-groomed, well-developed  EYES: No proptosis, no lid lag, anicteric  HEENT:  Atraumatic, Normocephalic, moist mucous membranes  RESPIRATORY: Respirations are even and unlabored  CARDIOVASCULAR: Regular rate   GI:+ suprapubic cath, non-tender  MUSCULOSKELETAL: Full range of motion, normal strength  NEURO: sensation intact, extraocular movements intact, no tremor, normal reflexes  PSYCH: Alert and oriented x 3, reactive affect, normal mood  CUSHING'S SIGNS: no abdominal striae    POCT Blood Glucose.: 260 mg/dL (03-25-23 @ 09:09)  POCT Blood Glucose.: 291 mg/dL (03-24-23 @ 21:13)  POCT Blood Glucose.: 352 mg/dL (03-24-23 @ 16:51)  POCT Blood Glucose.: 264 mg/dL (03-24-23 @ 11:10)  POCT Blood Glucose.: 488 mg/dL (03-24-23 @ 07:38)  POCT Blood Glucose.: 435 mg/dL (03-24-23 @ 06:44)  POCT Blood Glucose.: 474 mg/dL (03-24-23 @ 04:08)  POCT Blood Glucose.: 476 mg/dL (03-24-23 @ 01:37)  POCT Blood Glucose.: 533 mg/dL (03-24-23 @ 00:17)  POCT Blood Glucose.: 528 mg/dL (03-23-23 @ 23:29)  POCT Blood Glucose.: 551 mg/dL (03-23-23 @ 23:18)  POCT Blood Glucose.: 228 mg/dL (03-23-23 @ 17:30)  POCT Blood Glucose.: 165 mg/dL (03-23-23 @ 11:14)  POCT Blood Glucose.: 182 mg/dL (03-23-23 @ 06:27)  POCT Blood Glucose.: 222 mg/dL (03-22-23 @ 22:01)  POCT Blood Glucose.: 230 mg/dL (03-22-23 @ 17:51)  POCT Blood Glucose.: 96 mg/dL (03-22-23 @ 12:59)      03-25    134<L>  |  100  |  27<H>  ----------------------------<  282<H>  4.3   |  21<L>  |  0.81    eGFR: 92    Ca    9.1      03-25  Mg     1.7     03-25  Phos  3.8     03-25    TPro  6.8  /  Alb  3.3  /  TBili  <0.2  /  DBili  x   /  AST  12  /  ALT  10  /  AlkPhos  114  03-25

## 2023-03-25 NOTE — PROGRESS NOTE ADULT - PROBLEM SELECTOR PLAN 6
Patient w abdominal pain 2/2 to necrotizing fasc,   Plan:  - C/w  home regimen of oxycodone 10mg and 20mg alternating  - iSTOP

## 2023-03-25 NOTE — PROGRESS NOTE ADULT - SUBJECTIVE AND OBJECTIVE BOX
UROLOGY PROGRESS NOTE    SUBJECTIVE: Patient seen and examined bedside. Complains of systemic itching, attributing to allergies and reduced dosage of benadryl. Suprapubic pain and LUTS improving since SPT placed. Tolerating SPT. nonambulatory    enoxaparin Injectable 40 milliGRAM(s) SubCutaneous every 24 hours  labetalol 100 milliGRAM(s) Oral every 24 hours  losartan 50 milliGRAM(s) Oral daily      Vital Signs Last 24 Hrs  T(C): 36.8 (25 Mar 2023 05:00), Max: 37.3 (24 Mar 2023 20:47)  T(F): 98.2 (25 Mar 2023 05:00), Max: 99.1 (24 Mar 2023 20:47)  HR: 84 (25 Mar 2023 05:00) (84 - 100)  BP: 139/80 (25 Mar 2023 05:00) (113/62 - 144/72)  BP(mean): --  RR: 17 (25 Mar 2023 05:00) (17 - 18)  SpO2: 98% (25 Mar 2023 05:00) (98% - 99%)    Parameters below as of 24 Mar 2023 20:47  Patient On (Oxygen Delivery Method): room air      I&O's Detail    24 Mar 2023 07:01  -  25 Mar 2023 07:00  --------------------------------------------------------  IN:    IV PiggyBack: 100 mL  Total IN: 100 mL    OUT:    Indwelling Catheter - Suprapubic (mL): 300 mL    Voided (mL): 250 mL  Total OUT: 550 mL    Total NET: -450 mL      25 Mar 2023 07:01  -  25 Mar 2023 13:40  --------------------------------------------------------  IN:  Total IN: 0 mL    OUT:    Colostomy (mL): 100 mL    Indwelling Catheter - Suprapubic (mL): 1000 mL  Total OUT: 1100 mL    Total NET: -1100 mL          PHYSICAL EXAM    General: NAD, resting comfortably in bed, scratching   Pulm: Nonlabored breathing, no respiratory distress on room air  Abd: soft, ND/NT, no rebound tenderness, no guarding, LLQ ostomy patent/perfused,   : SPT 12 fr in RLQ draining yellow clear urine with occasional debris        LABS:                        11.8   12.80 )-----------( 396      ( 25 Mar 2023 05:30 )             38.7     03-25    134<L>  |  100  |  27<H>  ----------------------------<  282<H>  4.3   |  21<L>  |  0.81    Ca    9.1      25 Mar 2023 05:30  Phos  3.8     03-25  Mg     1.7     03-25    TPro  6.8  /  Alb  3.3  /  TBili  <0.2  /  DBili  x   /  AST  12  /  ALT  10  /  AlkPhos  114  03-25          CULTURES:      Culture - Blood (collected 03-19-23 @ 00:16)  Source: .Blood Blood-Peripheral  Final Report (03-24-23 @ 01:00):    No growth at 5 days.    Culture - Blood (collected 03-19-23 @ 00:16)  Source: .Blood Blood-Peripheral  Final Report (03-24-23 @ 01:00):    No growth at 5 days.        Culture - Urine (collected 03-18-23 @ 23:01)  Source: Suprapubic Suprapubic  Final Report (03-22-23 @ 13:12):    >100,000 CFU/ml Enterococcus faecalis    >100,000 CFU/ml Klebsiella pneumoniae (Carbapenem Resistant)    Result called to and read back by_ 03/22/2023 13:11:31 Winsome RN  Organism: Enterococcus faecalis  Klepne MDRO  Klepne MDRO (03-22-23 @ 13:12)  Organism: Klepne MDRO (03-22-23 @ 13:12)      Method Type: FESTUS      -  Ampicillin: R >16 These ampicillin results predict results for amoxicillin      -  Ampicillin/Sulbactam: R >16/8 Enterobacter, Klebsiella aerogenes, Citrobacter, and Serratia may develop resistance during prolonged therapy (3-4 days)      -  Cefazolin: R >16 For uncomplicated UTI with K. pneumoniae, E. coli, or P. mirablis: FESTUS <=16 is sensitive and FESTUS >=32 is resistant. This also predicts results for oral agents cefaclor, cefdinir, cefpodoxime, cefprozil, cefuroxime axetil, cephalexin and locarbef for uncomplicated UTI. Note that some isolates may be susceptible to these agents while testing resistant to cefazolin.      -  Ceftriaxone: R >32 Enterobacter, Klebsiella aerogenes, Citrobacter, and Serratia may develop resistance during prolonged therapy      -  Ciprofloxacin: R >2      -  Ertapenem: R >1      -  Gentamicin: S <=2      -  Meropenem: R >8      -  Piperacillin/Tazobactam: R >64      -  Tobramycin: S 4      -  Trimethoprim/Sulfamethoxazole: R >2/38  Organism: Jovaniaryane MDRO (03-22-23 @ 13:12)      Method Type: FESTUS  Organism: Enterococcus faecalis (03-22-23 @ 13:12)      Method Type: FESTUS      -  Ampicillin: S <=2 Predicts results to ampicillin/sulbactam, amoxacillin-clavulanate and  piperacillin-tazobactam.      -  Ciprofloxacin: R >2      -  Tetracycline: R >8      -  Vancomycin: S 2        RADIOLOGY & ADDITIONAL STUDIES:

## 2023-03-25 NOTE — PROGRESS NOTE ADULT - ATTENDING COMMENTS
Patient was seen and examined at bedside on 3/25/2023 at 11 am. Patient reports feeling super itchy. Reports feeling that her infection has resolved. Denies urinary symptoms, abdominal pain, N/V, or SOB. ROS is otherwise negative. Vitals, labwork and pertinent imaging reviewed. Physical exam - NAD, AAO x  4, PERRLA, EOMI, supple neck, chest - CTA b/l, CV - s1s2, no m/r/g, abd - soft, NTND + BS,  ext - wwp, psych - normal affect, skin - no rash, back - midline, no spinal TTP    Plan  -C/w Insulin management  -Plan for d/c tomorrow

## 2023-03-25 NOTE — PROGRESS NOTE ADULT - PROBLEM SELECTOR PLAN 1
-the patient's glucose levels have started to improve. As infection is managed and pain is controlled, anticipate improved diabetes control  -at this time, she is eating and without any acute events or reported symptoms  -continue lantus 60 at bedtime but increase the morning Lantus to 30 units  -increase admelog to 36 units TID with meals  -consistent carbohydrate diet  -correctional sliding scale  -based on the repeated infections, screen for Cushing's.  Dexamethasone 1 mg at midnight tonight and AM cortisol tomorrow morning.  Attempts to screen in the outpatient setting have been unsuccessful as she's persistently admitted to the hospital.

## 2023-03-25 NOTE — PROGRESS NOTE ADULT - PROBLEM SELECTOR PLAN 4
A1c of 10 on previous admission   Home med: Admelog 35units premeals and Humulin 160u TID   Hyperglycemic oh admission with FS>500, downtrended after fluids and Insulin s/q   Elevated 3/23 ON and this AM-> likely 2/2 to solucortef premedication prior to procedure  Plan:  - A1c 12.5   - No acidosis or AG  w elevated FSG y/day night and this AM  - Endocrinology consulted; recommending custom ISS  - Lantus 60 U. 30U at daytime.  - Admelog 36 units pre-meals   - dexa suppression this PM, AM cortisol (8AM) for 3/26. f/u outpatient.  - F/u recs.

## 2023-03-25 NOTE — PROGRESS NOTE ADULT - SUBJECTIVE AND OBJECTIVE BOX
INTERVAL HPI/OVERNIGHT EVENTS:  O/N: NAEON.    This morning: Patient was seen and examined at bedside. Patient says current pain regimen is keeping pain under control. Reports amenable for d/c tomorrow. Patient is feeling well, concerned about flecks coming from belly button. Patient denies any new pain, discharge, or blood from SPC site. Feels headache but improved. Poor sleep. Denies fever, chills, c/p, palpitations, SOB, cough, diarrhea, constipation, dysuria, hematuria, new LE swelling.    Vital Signs Last 24 Hrs  T(C): 36.8 (25 Mar 2023 05:00), Max: 37.3 (24 Mar 2023 20:47)  T(F): 98.2 (25 Mar 2023 05:00), Max: 99.1 (24 Mar 2023 20:47)  HR: 84 (25 Mar 2023 05:00) (84 - 100)  BP: 139/80 (25 Mar 2023 05:00) (113/62 - 144/72)  BP(mean): --  RR: 17 (25 Mar 2023 05:00) (17 - 18)  SpO2: 98% (25 Mar 2023 05:00) (98% - 99%)    Parameters below as of 24 Mar 2023 20:47  Patient On (Oxygen Delivery Method): room air          PHYSICAL EXAM:  Constitutional: large body habitus, NAD  HEENT: EOMI, sclera non-icteric, neck supple, trachea midline, no masses  Respiratory: CTA B/L. No crackles/rhonchi. No accessory muscle use.   Cardiovascular: RRR, normal S1S2, no M/R/G  Gastrointestinal: abdomen soft. Suprapubic tenderness to palpation. LLQ ostomy w/ stool output. Suprapubic cath w/o erythema/fluctuance at site. No hematoma/ fluctuance at site.  Extremities: Warm, well perfused, pulses equal bilateral upper and lower extremities, no edema  Neurological: AAOx3, CN Grossly intact. LLE 2/5 strength in knee flexion. Able to move L toes however limited to no passive ROM in L ankle.  RLE +3/5.  Skin: Normal temperature, warm, dry    MEDICATIONS  (STANDING):  acetaminophen     Tablet .. 975 milliGRAM(s) Oral every 8 hours  budesonide 160 MICROgram(s)/formoterol 4.5 MICROgram(s) Inhaler 2 Puff(s) Inhalation two times a day  dextrose 5%. 1000 milliLiter(s) (100 mL/Hr) IV Continuous <Continuous>  dextrose 5%. 1000 milliLiter(s) (50 mL/Hr) IV Continuous <Continuous>  dextrose 50% Injectable 25 Gram(s) IV Push once  dextrose 50% Injectable 12.5 Gram(s) IV Push once  dextrose 50% Injectable 25 Gram(s) IV Push once  glucagon  Injectable 1 milliGRAM(s) IntraMuscular once  insulin glargine Injectable (LANTUS) 20 Unit(s) SubCutaneous at bedtime  insulin lispro (ADMELOG) corrective regimen sliding scale   SubCutaneous Before meals and at bedtime  insulin lispro Injectable (ADMELOG) 30 Unit(s) SubCutaneous three times a day before meals  ketorolac   Injectable 15 milliGRAM(s) IV Push every 8 hours  losartan 50 milliGRAM(s) Oral daily  oxybutynin 5 milliGRAM(s) Oral every 8 hours  piperacillin/tazobactam IVPB.. 4.5 Gram(s) IV Intermittent every 8 hours  sodium chloride 0.9%. 1000 milliLiter(s) (60 mL/Hr) IV Continuous <Continuous>    MEDICATIONS  (PRN):  albuterol/ipratropium for Nebulization 3 milliLiter(s) Nebulizer every 6 hours PRN Shortness of Breath and/or Wheezing  benzocaine/menthol Lozenge 1 Lozenge Oral every 8 hours PRN Sore Throat  benzonatate 100 milliGRAM(s) Oral every 8 hours PRN Cough  dextrose Oral Gel 15 Gram(s) Oral once PRN Blood Glucose LESS THAN 70 milliGRAM(s)/deciliter  HYDROmorphone  Injectable 1 milliGRAM(s) IV Push every 4 hours PRN Severe Pain (7 - 10)      Allergies    amoxicillin (Unknown)  clindamycin (Pruritus)  fish (Hives; Urticaria)  iodine containing compounds (Hives; Anaphylaxis)  penicillin (Hives)  shellfish. (Hives; Anaphylaxis)    Intolerances    Bactrim I.V. (Rash (Mod to Severe))    LABS:                         11.8   12.80 )-----------( 396      ( 25 Mar 2023 05:30 )             38.7     03-25    134<L>  |  100  |  27<H>  ----------------------------<  282<H>  4.3   |  21<L>  |  0.81    Ca    9.1      25 Mar 2023 05:30  Phos  3.8     03-25  Mg     1.7     03-25    TPro  6.8  /  Alb  3.3  /  TBili  <0.2  /  DBili  x   /  AST  12  /  ALT  10  /  AlkPhos  114  03-25                  RADIOLOGY, EKG & ADDITIONAL TESTS:

## 2023-03-25 NOTE — PROGRESS NOTE ADULT - PROBLEM SELECTOR PLAN 3
Placed on 11/11/21 at [facility] due to abdominal necrotizing fascitis   Plan:  - Ostomy care and wound care education   - Wound care consulted, f/u recs.-> no further recs  - Will provider scripts for outpt care.

## 2023-03-25 NOTE — PROGRESS NOTE ADULT - PROBLEM SELECTOR PLAN 1
2/2 complicated urinary tract infection due to suprapubic catheter   SIRS Positive for tachycardia and WBC +lactic acidosis >5 with suspected source of infection   Plan:  - c/w gentamycin, s/p 1 dose 3/22 PM, f/u gentamycin level this AM-> 5.0, dosing q36h confirmed w clinical pharmacist-> second dose today 3/24/22 11AM (no further doses needed), s/p Abx.  - urine cx growing klebsiella and enterococcus faecalis, Enterococcus susceptible to ampicillin, Klebsiella MDR->S to gentamycin  - s/p SPC catheter exchange.    #Leukocytosis  - Monitor consider elevation given steroid admin and recent instrumentation, has been afebrile, will consider rectal T if low grade fever

## 2023-03-26 LAB
ALBUMIN SERPL ELPH-MCNC: 3.8 G/DL — SIGNIFICANT CHANGE UP (ref 3.3–5)
ALP SERPL-CCNC: 130 U/L — HIGH (ref 40–120)
ALT FLD-CCNC: 12 U/L — SIGNIFICANT CHANGE UP (ref 10–45)
ANION GAP SERPL CALC-SCNC: 14 MMOL/L — SIGNIFICANT CHANGE UP (ref 5–17)
ANION GAP SERPL CALC-SCNC: 8 MMOL/L — SIGNIFICANT CHANGE UP (ref 5–17)
ANISOCYTOSIS BLD QL: SLIGHT — SIGNIFICANT CHANGE UP
AST SERPL-CCNC: 17 U/L — SIGNIFICANT CHANGE UP (ref 10–40)
BASOPHILS # BLD AUTO: 0 K/UL — SIGNIFICANT CHANGE UP (ref 0–0.2)
BASOPHILS # BLD AUTO: 0.06 K/UL — SIGNIFICANT CHANGE UP (ref 0–0.2)
BASOPHILS # BLD AUTO: 0.06 K/UL — SIGNIFICANT CHANGE UP (ref 0–0.2)
BASOPHILS NFR BLD AUTO: 0 % — SIGNIFICANT CHANGE UP (ref 0–2)
BASOPHILS NFR BLD AUTO: 0.4 % — SIGNIFICANT CHANGE UP (ref 0–2)
BASOPHILS NFR BLD AUTO: 0.4 % — SIGNIFICANT CHANGE UP (ref 0–2)
BILIRUB SERPL-MCNC: <0.2 MG/DL — SIGNIFICANT CHANGE UP (ref 0.2–1.2)
BLASTS # FLD: 0.9 % — HIGH (ref 0–0)
BUN SERPL-MCNC: 25 MG/DL — HIGH (ref 7–23)
BUN SERPL-MCNC: 28 MG/DL — HIGH (ref 7–23)
CALCIUM SERPL-MCNC: 10 MG/DL — SIGNIFICANT CHANGE UP (ref 8.4–10.5)
CALCIUM SERPL-MCNC: 9.5 MG/DL — SIGNIFICANT CHANGE UP (ref 8.4–10.5)
CHLORIDE SERPL-SCNC: 100 MMOL/L — SIGNIFICANT CHANGE UP (ref 96–108)
CHLORIDE SERPL-SCNC: 99 MMOL/L — SIGNIFICANT CHANGE UP (ref 96–108)
CO2 SERPL-SCNC: 20 MMOL/L — LOW (ref 22–31)
CO2 SERPL-SCNC: 25 MMOL/L — SIGNIFICANT CHANGE UP (ref 22–31)
CREAT SERPL-MCNC: 0.74 MG/DL — SIGNIFICANT CHANGE UP (ref 0.5–1.3)
CREAT SERPL-MCNC: 0.85 MG/DL — SIGNIFICANT CHANGE UP (ref 0.5–1.3)
DACRYOCYTES BLD QL SMEAR: SLIGHT — SIGNIFICANT CHANGE UP
EGFR: 103 ML/MIN/1.73M2 — SIGNIFICANT CHANGE UP
EGFR: 87 ML/MIN/1.73M2 — SIGNIFICANT CHANGE UP
EOSINOPHIL # BLD AUTO: 0.11 K/UL — SIGNIFICANT CHANGE UP (ref 0–0.5)
EOSINOPHIL # BLD AUTO: 0.16 K/UL — SIGNIFICANT CHANGE UP (ref 0–0.5)
EOSINOPHIL # BLD AUTO: 0.19 K/UL — SIGNIFICANT CHANGE UP (ref 0–0.5)
EOSINOPHIL NFR BLD AUTO: 0.9 % — SIGNIFICANT CHANGE UP (ref 0–6)
EOSINOPHIL NFR BLD AUTO: 1.2 % — SIGNIFICANT CHANGE UP (ref 0–6)
EOSINOPHIL NFR BLD AUTO: 1.2 % — SIGNIFICANT CHANGE UP (ref 0–6)
GIANT PLATELETS BLD QL SMEAR: PRESENT — SIGNIFICANT CHANGE UP
GLUCOSE BLDC GLUCOMTR-MCNC: 126 MG/DL — HIGH (ref 70–99)
GLUCOSE BLDC GLUCOMTR-MCNC: 200 MG/DL — HIGH (ref 70–99)
GLUCOSE BLDC GLUCOMTR-MCNC: 209 MG/DL — HIGH (ref 70–99)
GLUCOSE BLDC GLUCOMTR-MCNC: 214 MG/DL — HIGH (ref 70–99)
GLUCOSE SERPL-MCNC: 145 MG/DL — HIGH (ref 70–99)
GLUCOSE SERPL-MCNC: 256 MG/DL — HIGH (ref 70–99)
HCT VFR BLD CALC: 41 % — SIGNIFICANT CHANGE UP (ref 34.5–45)
HCT VFR BLD CALC: 42 % — SIGNIFICANT CHANGE UP (ref 34.5–45)
HCT VFR BLD CALC: 47.3 % — HIGH (ref 34.5–45)
HGB BLD-MCNC: 12.1 G/DL — SIGNIFICANT CHANGE UP (ref 11.5–15.5)
HGB BLD-MCNC: 12.7 G/DL — SIGNIFICANT CHANGE UP (ref 11.5–15.5)
HGB BLD-MCNC: 14 G/DL — SIGNIFICANT CHANGE UP (ref 11.5–15.5)
IMM GRANULOCYTES NFR BLD AUTO: 0.6 % — SIGNIFICANT CHANGE UP (ref 0–0.9)
IMM GRANULOCYTES NFR BLD AUTO: 0.7 % — SIGNIFICANT CHANGE UP (ref 0–0.9)
LYMPHOCYTES # BLD AUTO: 24.1 % — SIGNIFICANT CHANGE UP (ref 13–44)
LYMPHOCYTES # BLD AUTO: 27.6 % — SIGNIFICANT CHANGE UP (ref 13–44)
LYMPHOCYTES # BLD AUTO: 3.71 K/UL — HIGH (ref 1–3.3)
LYMPHOCYTES # BLD AUTO: 3.72 K/UL — HIGH (ref 1–3.3)
LYMPHOCYTES # BLD AUTO: 3.81 K/UL — HIGH (ref 1–3.3)
LYMPHOCYTES # BLD AUTO: 32.4 % — SIGNIFICANT CHANGE UP (ref 13–44)
MAGNESIUM SERPL-MCNC: 1.9 MG/DL — SIGNIFICANT CHANGE UP (ref 1.6–2.6)
MANUAL SMEAR VERIFICATION: SIGNIFICANT CHANGE UP
MCHC RBC-ENTMCNC: 24.2 PG — LOW (ref 27–34)
MCHC RBC-ENTMCNC: 24.3 PG — LOW (ref 27–34)
MCHC RBC-ENTMCNC: 24.4 PG — LOW (ref 27–34)
MCHC RBC-ENTMCNC: 29.5 GM/DL — LOW (ref 32–36)
MCHC RBC-ENTMCNC: 29.6 GM/DL — LOW (ref 32–36)
MCHC RBC-ENTMCNC: 30.2 GM/DL — LOW (ref 32–36)
MCV RBC AUTO: 80.6 FL — SIGNIFICANT CHANGE UP (ref 80–100)
MCV RBC AUTO: 81.8 FL — SIGNIFICANT CHANGE UP (ref 80–100)
MCV RBC AUTO: 82 FL — SIGNIFICANT CHANGE UP (ref 80–100)
MICROCYTES BLD QL: SLIGHT — SIGNIFICANT CHANGE UP
MONOCYTES # BLD AUTO: 0.41 K/UL — SIGNIFICANT CHANGE UP (ref 0–0.9)
MONOCYTES # BLD AUTO: 0.94 K/UL — HIGH (ref 0–0.9)
MONOCYTES # BLD AUTO: 0.95 K/UL — HIGH (ref 0–0.9)
MONOCYTES NFR BLD AUTO: 3.5 % — SIGNIFICANT CHANGE UP (ref 2–14)
MONOCYTES NFR BLD AUTO: 6.2 % — SIGNIFICANT CHANGE UP (ref 2–14)
MONOCYTES NFR BLD AUTO: 7 % — SIGNIFICANT CHANGE UP (ref 2–14)
NEUTROPHILS # BLD AUTO: 10.39 K/UL — HIGH (ref 1.8–7.4)
NEUTROPHILS # BLD AUTO: 7.32 K/UL — SIGNIFICANT CHANGE UP (ref 1.8–7.4)
NEUTROPHILS # BLD AUTO: 8.52 K/UL — HIGH (ref 1.8–7.4)
NEUTROPHILS NFR BLD AUTO: 62.3 % — SIGNIFICANT CHANGE UP (ref 43–77)
NEUTROPHILS NFR BLD AUTO: 63.1 % — SIGNIFICANT CHANGE UP (ref 43–77)
NEUTROPHILS NFR BLD AUTO: 67.5 % — SIGNIFICANT CHANGE UP (ref 43–77)
NRBC # BLD: 0 /100 WBCS — SIGNIFICANT CHANGE UP (ref 0–0)
NRBC # BLD: 0 /100 WBCS — SIGNIFICANT CHANGE UP (ref 0–0)
OVALOCYTES BLD QL SMEAR: SLIGHT — SIGNIFICANT CHANGE UP
PHOSPHATE SERPL-MCNC: 3.9 MG/DL — SIGNIFICANT CHANGE UP (ref 2.5–4.5)
PLAT MORPH BLD: ABNORMAL
PLATELET # BLD AUTO: 440 K/UL — HIGH (ref 150–400)
PLATELET # BLD AUTO: 471 K/UL — HIGH (ref 150–400)
PLATELET # BLD AUTO: 530 K/UL — HIGH (ref 150–400)
POIKILOCYTOSIS BLD QL AUTO: SLIGHT — SIGNIFICANT CHANGE UP
POLYCHROMASIA BLD QL SMEAR: SLIGHT — SIGNIFICANT CHANGE UP
POTASSIUM SERPL-MCNC: 5.1 MMOL/L — SIGNIFICANT CHANGE UP (ref 3.5–5.3)
POTASSIUM SERPL-MCNC: 5.5 MMOL/L — HIGH (ref 3.5–5.3)
POTASSIUM SERPL-SCNC: 5.1 MMOL/L — SIGNIFICANT CHANGE UP (ref 3.5–5.3)
POTASSIUM SERPL-SCNC: 5.5 MMOL/L — HIGH (ref 3.5–5.3)
PROT SERPL-MCNC: 8.1 G/DL — SIGNIFICANT CHANGE UP (ref 6–8.3)
RBC # BLD: 5.01 M/UL — SIGNIFICANT CHANGE UP (ref 3.8–5.2)
RBC # BLD: 5.21 M/UL — HIGH (ref 3.8–5.2)
RBC # BLD: 5.77 M/UL — HIGH (ref 3.8–5.2)
RBC # FLD: 15.2 % — HIGH (ref 10.3–14.5)
RBC # FLD: 15.5 % — HIGH (ref 10.3–14.5)
RBC # FLD: 15.5 % — HIGH (ref 10.3–14.5)
RBC BLD AUTO: ABNORMAL
SODIUM SERPL-SCNC: 133 MMOL/L — LOW (ref 135–145)
SODIUM SERPL-SCNC: 133 MMOL/L — LOW (ref 135–145)
SPHEROCYTES BLD QL SMEAR: SLIGHT — SIGNIFICANT CHANGE UP
WBC # BLD: 11.75 K/UL — HIGH (ref 3.8–10.5)
WBC # BLD: 13.5 K/UL — HIGH (ref 3.8–10.5)
WBC # BLD: 15.94 K/UL — HIGH (ref 3.8–10.5)
WBC # FLD AUTO: 11.75 K/UL — HIGH (ref 3.8–10.5)
WBC # FLD AUTO: 13.5 K/UL — HIGH (ref 3.8–10.5)
WBC # FLD AUTO: 15.94 K/UL — HIGH (ref 3.8–10.5)

## 2023-03-26 PROCEDURE — 99232 SBSQ HOSP IP/OBS MODERATE 35: CPT

## 2023-03-26 PROCEDURE — 99232 SBSQ HOSP IP/OBS MODERATE 35: CPT | Mod: GC

## 2023-03-26 RX ORDER — DIPHENHYDRAMINE HCL 50 MG
25 CAPSULE ORAL ONCE
Refills: 0 | Status: COMPLETED | OUTPATIENT
Start: 2023-03-26 | End: 2023-03-26

## 2023-03-26 RX ORDER — DEXAMETHASONE 0.5 MG/5ML
1 ELIXIR ORAL ONCE
Refills: 0 | Status: COMPLETED | OUTPATIENT
Start: 2023-03-26 | End: 2023-03-26

## 2023-03-26 RX ADMIN — OXYCODONE HYDROCHLORIDE 20 MILLIGRAM(S): 5 TABLET ORAL at 17:34

## 2023-03-26 RX ADMIN — OXYCODONE HYDROCHLORIDE 20 MILLIGRAM(S): 5 TABLET ORAL at 23:26

## 2023-03-26 RX ADMIN — Medication 1 MILLIGRAM(S): at 23:19

## 2023-03-26 RX ADMIN — Medication 36 UNIT(S): at 17:30

## 2023-03-26 RX ADMIN — OXYCODONE HYDROCHLORIDE 20 MILLIGRAM(S): 5 TABLET ORAL at 03:58

## 2023-03-26 RX ADMIN — OXYCODONE HYDROCHLORIDE 20 MILLIGRAM(S): 5 TABLET ORAL at 11:10

## 2023-03-26 RX ADMIN — Medication 1 MILLIGRAM(S): at 00:07

## 2023-03-26 RX ADMIN — Medication 3: at 17:30

## 2023-03-26 RX ADMIN — OXYCODONE HYDROCHLORIDE 20 MILLIGRAM(S): 5 TABLET ORAL at 11:05

## 2023-03-26 RX ADMIN — Medication 100 MILLIGRAM(S): at 12:27

## 2023-03-26 RX ADMIN — Medication 5 MILLIGRAM(S): at 11:00

## 2023-03-26 RX ADMIN — Medication 6: at 08:59

## 2023-03-26 RX ADMIN — ENOXAPARIN SODIUM 40 MILLIGRAM(S): 100 INJECTION SUBCUTANEOUS at 21:08

## 2023-03-26 RX ADMIN — Medication 5 MILLIGRAM(S): at 18:06

## 2023-03-26 RX ADMIN — MONTELUKAST 10 MILLIGRAM(S): 4 TABLET, CHEWABLE ORAL at 15:05

## 2023-03-26 RX ADMIN — Medication 36 UNIT(S): at 08:59

## 2023-03-26 RX ADMIN — OXYCODONE HYDROCHLORIDE 20 MILLIGRAM(S): 5 TABLET ORAL at 22:26

## 2023-03-26 RX ADMIN — LOSARTAN POTASSIUM 50 MILLIGRAM(S): 100 TABLET, FILM COATED ORAL at 06:52

## 2023-03-26 RX ADMIN — OXYCODONE HYDROCHLORIDE 20 MILLIGRAM(S): 5 TABLET ORAL at 04:58

## 2023-03-26 RX ADMIN — Medication 25 MILLIGRAM(S): at 12:27

## 2023-03-26 RX ADMIN — Medication 6: at 22:25

## 2023-03-26 RX ADMIN — OXYCODONE HYDROCHLORIDE 20 MILLIGRAM(S): 5 TABLET ORAL at 00:36

## 2023-03-26 RX ADMIN — Medication 36 UNIT(S): at 13:11

## 2023-03-26 RX ADMIN — INSULIN GLARGINE 30 UNIT(S): 100 INJECTION, SOLUTION SUBCUTANEOUS at 10:36

## 2023-03-26 RX ADMIN — OXYCODONE HYDROCHLORIDE 20 MILLIGRAM(S): 5 TABLET ORAL at 17:58

## 2023-03-26 RX ADMIN — INSULIN GLARGINE 60 UNIT(S): 100 INJECTION, SOLUTION SUBCUTANEOUS at 22:26

## 2023-03-26 NOTE — PROGRESS NOTE ADULT - PROBLEM SELECTOR PLAN 3
Placed on 11/11/21 at [facility] due to abdominal necrotizing fascitis   Plan:  - Ostomy care and wound care education   - Wound care consulted, f/u recs.-> no further recs  - Will provider scripts for outpt care. Attending

## 2023-03-26 NOTE — PROGRESS NOTE ADULT - SUBJECTIVE AND OBJECTIVE BOX
INTERVAL HPI/OVERNIGHT EVENTS:  O/N: NAEON.    This morning: Patient was seen and examined at bedside. Patient says current pain regimen is keeping pain under control. Reports amenable for d/c tomorrow. Patient is feeling well, concerned about flecks coming from belly button. Patient denies any new pain, discharge, or blood from SPC site. Feels headache but improved. Poor sleep. Denies fever, chills, c/p, palpitations, SOB, cough, diarrhea, constipation, dysuria, hematuria, new LE swelling.      Vital Signs Last 24 Hrs  T(C): 36.9 (26 Mar 2023 12:46), Max: 36.9 (25 Mar 2023 14:10)  T(F): 98.4 (26 Mar 2023 12:46), Max: 98.4 (25 Mar 2023 14:10)  HR: 92 (26 Mar 2023 12:46) (76 - 96)  BP: 132/86 (26 Mar 2023 12:46) (115/82 - 140/85)  BP(mean): --  RR: 18 (26 Mar 2023 12:46) (16 - 18)  SpO2: 96% (26 Mar 2023 12:46) (96% - 99%)    Parameters below as of 26 Mar 2023 12:46  Patient On (Oxygen Delivery Method): room air    air          PHYSICAL EXAM:  Constitutional: large body habitus, NAD  HEENT: EOMI, sclera non-icteric, neck supple, trachea midline, no masses  Respiratory: CTA B/L. No crackles/rhonchi. No accessory muscle use.   Cardiovascular: RRR, normal S1S2, no M/R/G  Gastrointestinal: abdomen soft. Suprapubic tenderness to palpation. LLQ ostomy w/ stool output. Suprapubic cath w/o erythema/fluctuance at site. No hematoma/ fluctuance at site.  Extremities: Warm, well perfused, pulses equal bilateral upper and lower extremities, no edema  Neurological: AAOx3, CN Grossly intact. LLE 2/5 strength in knee flexion. Able to move L toes however limited to no passive ROM in L ankle.  RLE +3/5.  Skin: Normal temperature, warm, dry      MEDICATIONS  (STANDING):  acetaminophen     Tablet .. 975 milliGRAM(s) Oral every 8 hours  dextrose 5%. 1000 milliLiter(s) (100 mL/Hr) IV Continuous <Continuous>  dextrose 5%. 1000 milliLiter(s) (50 mL/Hr) IV Continuous <Continuous>  dextrose 50% Injectable 25 Gram(s) IV Push once  dextrose 50% Injectable 12.5 Gram(s) IV Push once  dextrose 50% Injectable 25 Gram(s) IV Push once  enoxaparin Injectable 40 milliGRAM(s) SubCutaneous every 24 hours  glucagon  Injectable 1 milliGRAM(s) IntraMuscular once  insulin glargine Injectable (LANTUS) 30 Unit(s) SubCutaneous every morning  insulin glargine Injectable (LANTUS) 60 Unit(s) SubCutaneous at bedtime  insulin lispro (ADMELOG) corrective regimen sliding scale   SubCutaneous Before meals and at bedtime  insulin lispro Injectable (ADMELOG) 36 Unit(s) SubCutaneous three times a day before meals  labetalol 100 milliGRAM(s) Oral every 24 hours  losartan 50 milliGRAM(s) Oral daily  montelukast 10 milliGRAM(s) Oral every 24 hours  oxybutynin 5 milliGRAM(s) Oral every 8 hours    MEDICATIONS  (PRN):  albuterol/ipratropium for Nebulization 3 milliLiter(s) Nebulizer every 6 hours PRN Shortness of Breath and/or Wheezing  benzocaine/menthol Lozenge 1 Lozenge Oral every 8 hours PRN Sore Throat  benzonatate 100 milliGRAM(s) Oral every 8 hours PRN Cough  dextrose Oral Gel 15 Gram(s) Oral once PRN Blood Glucose LESS THAN 70 milliGRAM(s)/deciliter  oxyCODONE    IR 10 milliGRAM(s) Oral every 4 hours PRN Moderate Pain (4 - 6)  oxyCODONE    IR 20 milliGRAM(s) Oral every 4 hours PRN Severe Pain (7 - 10)        Allergies    amoxicillin (Unknown)  clindamycin (Pruritus)  fish (Hives; Urticaria)  iodine containing compounds (Hives; Anaphylaxis)  penicillin (Hives)  shellfish. (Hives; Anaphylaxis)    Intolerances    Bactrim I.V. (Rash (Mod to Severe))    LABS:                                    12.1   11.75 )-----------( 440      ( 26 Mar 2023 07:51 )             41.0   03-26    133<L>  |  100  |  28<H>  ----------------------------<  256<H>  5.5<H>   |  25  |  0.85    Ca    9.5      26 Mar 2023 07:51  Phos  3.9     03-26  Mg     1.9     03-26    TPro  6.8  /  Alb  3.3  /  TBili  <0.2  /  DBili  x   /  AST  12  /  ALT  10  /  AlkPhos  114  03-25                    RADIOLOGY, EKG & ADDITIONAL TESTS:

## 2023-03-26 NOTE — PROGRESS NOTE ADULT - SUBJECTIVE AND OBJECTIVE BOX
Patient is awake, doing well. Tolerating breakfast.      MEDICATIONS  (STANDING):  acetaminophen     Tablet .. 975 milliGRAM(s) Oral every 8 hours  dextrose 5%. 1000 milliLiter(s) (100 mL/Hr) IV Continuous <Continuous>  dextrose 5%. 1000 milliLiter(s) (50 mL/Hr) IV Continuous <Continuous>  dextrose 50% Injectable 25 Gram(s) IV Push once  dextrose 50% Injectable 12.5 Gram(s) IV Push once  dextrose 50% Injectable 25 Gram(s) IV Push once  enoxaparin Injectable 40 milliGRAM(s) SubCutaneous every 24 hours  glucagon  Injectable 1 milliGRAM(s) IntraMuscular once  insulin glargine Injectable (LANTUS) 30 Unit(s) SubCutaneous every morning  insulin glargine Injectable (LANTUS) 60 Unit(s) SubCutaneous at bedtime  insulin lispro (ADMELOG) corrective regimen sliding scale   SubCutaneous Before meals and at bedtime  insulin lispro Injectable (ADMELOG) 36 Unit(s) SubCutaneous three times a day before meals  labetalol 100 milliGRAM(s) Oral every 24 hours  losartan 50 milliGRAM(s) Oral daily  montelukast 10 milliGRAM(s) Oral every 24 hours  oxybutynin 5 milliGRAM(s) Oral every 8 hours    MEDICATIONS  (PRN):  albuterol/ipratropium for Nebulization 3 milliLiter(s) Nebulizer every 6 hours PRN Shortness of Breath and/or Wheezing  benzocaine/menthol Lozenge 1 Lozenge Oral every 8 hours PRN Sore Throat  benzonatate 100 milliGRAM(s) Oral every 8 hours PRN Cough  dextrose Oral Gel 15 Gram(s) Oral once PRN Blood Glucose LESS THAN 70 milliGRAM(s)/deciliter  oxyCODONE    IR 10 milliGRAM(s) Oral every 4 hours PRN Moderate Pain (4 - 6)  oxyCODONE    IR 20 milliGRAM(s) Oral every 4 hours PRN Severe Pain (7 - 10)      Allergies  amoxicillin (Unknown)  clindamycin (Pruritus)  fish (Hives; Urticaria)  iodine containing compounds (Hives; Anaphylaxis)  penicillin (Hives)  shellfish. (Hives; Anaphylaxis)    Intolerances  Bactrim I.V. (Rash (Mod to Severe))    Review of Systems:  Constitutional: No fever  Eyes: No blurry vision  Neuro: No tremors  HEENT: No pain  Cardiovascular: No chest pain, palpitations  Respiratory: No SOB, no cough  GI: No nausea, vomiting, abdominal pain  Skin: no rash  Psych: no depression  ALL OTHER SYSTEMS REVIEWED AND NEGATIVE    PHYSICAL EXAM:  VITALS: T(C): 36.7 (03-26-23 @ 09:46)  T(F): 98 (03-26-23 @ 09:46), Max: 98.4 (03-25-23 @ 14:10)  HR: 76 (03-26-23 @ 09:46) (76 - 96)  BP: 140/85 (03-26-23 @ 09:46) (115/82 - 140/85)  RR:  (16 - 18)  SpO2:  (96% - 99%)  Wt(kg): --  GENERAL: NAD, well-groomed, well-developed  EYES: No proptosis, no lid lag, anicteric  HEENT:  Atraumatic, Normocephalic, moist mucous membranes  RESPIRATORY: Respirations are even and unlabored  CARDIOVASCULAR: Regular rate   GI: +holley and ostomy bag  SKIN: Dry, intact, No rashes or lesions  PSYCH: Alert and oriented x 3, normal affect, normal mood  CUSHING'S SIGNS: no striae    POCT Blood Glucose.: 209 mg/dL (03-26-23 @ 08:53)  Admelog 36+6  POCT Blood Glucose.: 319 mg/dL (03-25-23 @ 22:00)  Lantus 60 + Admelog 15  POCT Blood Glucose.: 263 mg/dL (03-25-23 @ 17:57)  Admelog 36+12  POCT Blood Glucose.: 133 mg/dL (03-25-23 @ 13:38) Order DC'd  POCT Blood Glucose.: 260 mg/dL (03-25-23 @ 09:09)  Admelog 30+10  POCT Blood Glucose.: 291 mg/dL (03-24-23 @ 21:13)  POCT Blood Glucose.: 352 mg/dL (03-24-23 @ 16:51)  POCT Blood Glucose.: 264 mg/dL (03-24-23 @ 11:10)  POCT Blood Glucose.: 488 mg/dL (03-24-23 @ 07:38)  POCT Blood Glucose.: 435 mg/dL (03-24-23 @ 06:44)  POCT Blood Glucose.: 474 mg/dL (03-24-23 @ 04:08)  POCT Blood Glucose.: 476 mg/dL (03-24-23 @ 01:37)  POCT Blood Glucose.: 533 mg/dL (03-24-23 @ 00:17)  POCT Blood Glucose.: 528 mg/dL (03-23-23 @ 23:29)  POCT Blood Glucose.: 551 mg/dL (03-23-23 @ 23:18)  POCT Blood Glucose.: 228 mg/dL (03-23-23 @ 17:30)  POCT Blood Glucose.: 165 mg/dL (03-23-23 @ 11:14)      03-26    133<L>  |  100  |  28<H>  ----------------------------<  256<H>  5.5<H>   |  25  |  0.85    eGFR: 87    Ca    9.5      03-26  Mg     1.9     03-26  Phos  3.9     03-26    TPro  6.8  /  Alb  3.3  /  TBili  <0.2  /  DBili  x   /  AST  12  /  ALT  10  /  AlkPhos  114  03-25

## 2023-03-26 NOTE — PROGRESS NOTE ADULT - PROBLEM SELECTOR PLAN 8
F: none  E: replete prn  N: Diabetic diet  A: lovenox 40mg  Dispo: RMF.

## 2023-03-26 NOTE — PROVIDER CONTACT NOTE (CRITICAL VALUE NOTIFICATION) - TEST AND RESULT REPORTED:
manual differential- .9% blastcell
Lactate 5.2
Gentamicin: 5.0
Lactate 2.7
serum glucose 575
587mg/dl

## 2023-03-26 NOTE — PROGRESS NOTE ADULT - PROBLEM SELECTOR PLAN 1
-Patient is doing well and fingerstick levels are improving nicely.  Her lunch fingerstick was discontinued yesterday. There was significant [place] downtime/delays.  Because she did not get her prandial insulin at lunch, she was hyperglycemic by pre-dinner time.    -for now, continue current doses of Lantus 60 units at bedtime and 30 units in the morning. Continue admelog 36 units TID with meals and her correction sliding scale  -consistent carbohydrate diet  -if the patient stays tonight, repeat a midnight dex suppression test as blood draw was unable to be done this morning  -Discharge: likely U500, doses TBD.  Her pre-admission dose was U500 160 TID with admelog up to 40 units TID with meals.  Anticipate that as her infection has improving, her insulin doses will not be as high. Patient checks sugars frequently and on discharge can take U500 home doses and hold admelog unless needed. Outpatient follow up

## 2023-03-27 ENCOUNTER — TRANSCRIPTION ENCOUNTER (OUTPATIENT)
Age: 43
End: 2023-03-27

## 2023-03-27 VITALS
SYSTOLIC BLOOD PRESSURE: 108 MMHG | TEMPERATURE: 98 F | HEART RATE: 90 BPM | DIASTOLIC BLOOD PRESSURE: 71 MMHG | RESPIRATION RATE: 17 BRPM | OXYGEN SATURATION: 97 %

## 2023-03-27 DIAGNOSIS — R79.89 OTHER SPECIFIED ABNORMAL FINDINGS OF BLOOD CHEMISTRY: ICD-10-CM

## 2023-03-27 DIAGNOSIS — D72.829 ELEVATED WHITE BLOOD CELL COUNT, UNSPECIFIED: ICD-10-CM

## 2023-03-27 LAB
ALBUMIN SERPL ELPH-MCNC: 3.6 G/DL — SIGNIFICANT CHANGE UP (ref 3.3–5)
ALP SERPL-CCNC: 115 U/L — SIGNIFICANT CHANGE UP (ref 40–120)
ALT FLD-CCNC: 11 U/L — SIGNIFICANT CHANGE UP (ref 10–45)
ANION GAP SERPL CALC-SCNC: 11 MMOL/L — SIGNIFICANT CHANGE UP (ref 5–17)
ANION GAP SERPL CALC-SCNC: 11 MMOL/L — SIGNIFICANT CHANGE UP (ref 5–17)
AST SERPL-CCNC: 12 U/L — SIGNIFICANT CHANGE UP (ref 10–40)
BASOPHILS # BLD AUTO: 0.06 K/UL — SIGNIFICANT CHANGE UP (ref 0–0.2)
BASOPHILS NFR BLD AUTO: 0.4 % — SIGNIFICANT CHANGE UP (ref 0–2)
BILIRUB SERPL-MCNC: <0.2 MG/DL — SIGNIFICANT CHANGE UP (ref 0.2–1.2)
BUN SERPL-MCNC: 36 MG/DL — HIGH (ref 7–23)
BUN SERPL-MCNC: 38 MG/DL — HIGH (ref 7–23)
CALCIUM SERPL-MCNC: 9 MG/DL — SIGNIFICANT CHANGE UP (ref 8.4–10.5)
CALCIUM SERPL-MCNC: 9.5 MG/DL — SIGNIFICANT CHANGE UP (ref 8.4–10.5)
CHLORIDE SERPL-SCNC: 98 MMOL/L — SIGNIFICANT CHANGE UP (ref 96–108)
CHLORIDE SERPL-SCNC: 99 MMOL/L — SIGNIFICANT CHANGE UP (ref 96–108)
CO2 SERPL-SCNC: 20 MMOL/L — LOW (ref 22–31)
CO2 SERPL-SCNC: 24 MMOL/L — SIGNIFICANT CHANGE UP (ref 22–31)
CORTIS AM PEAK SERPL-MCNC: 1.22 UG/DL — LOW (ref 6.02–18.4)
CREAT SERPL-MCNC: 0.9 MG/DL — SIGNIFICANT CHANGE UP (ref 0.5–1.3)
CREAT SERPL-MCNC: 1.02 MG/DL — SIGNIFICANT CHANGE UP (ref 0.5–1.3)
EGFR: 70 ML/MIN/1.73M2 — SIGNIFICANT CHANGE UP
EGFR: 81 ML/MIN/1.73M2 — SIGNIFICANT CHANGE UP
EOSINOPHIL # BLD AUTO: 0.19 K/UL — SIGNIFICANT CHANGE UP (ref 0–0.5)
EOSINOPHIL NFR BLD AUTO: 1.3 % — SIGNIFICANT CHANGE UP (ref 0–6)
GLUCOSE BLDC GLUCOMTR-MCNC: 145 MG/DL — HIGH (ref 70–99)
GLUCOSE BLDC GLUCOMTR-MCNC: 241 MG/DL — HIGH (ref 70–99)
GLUCOSE BLDC GLUCOMTR-MCNC: 282 MG/DL — HIGH (ref 70–99)
GLUCOSE SERPL-MCNC: 242 MG/DL — HIGH (ref 70–99)
GLUCOSE SERPL-MCNC: 277 MG/DL — HIGH (ref 70–99)
HCT VFR BLD CALC: 41.7 % — SIGNIFICANT CHANGE UP (ref 34.5–45)
HGB BLD-MCNC: 12.4 G/DL — SIGNIFICANT CHANGE UP (ref 11.5–15.5)
IMM GRANULOCYTES NFR BLD AUTO: 0.7 % — SIGNIFICANT CHANGE UP (ref 0–0.9)
LYMPHOCYTES # BLD AUTO: 27 % — SIGNIFICANT CHANGE UP (ref 13–44)
LYMPHOCYTES # BLD AUTO: 4 K/UL — HIGH (ref 1–3.3)
MAGNESIUM SERPL-MCNC: 2 MG/DL — SIGNIFICANT CHANGE UP (ref 1.6–2.6)
MCHC RBC-ENTMCNC: 24 PG — LOW (ref 27–34)
MCHC RBC-ENTMCNC: 29.7 GM/DL — LOW (ref 32–36)
MCV RBC AUTO: 80.7 FL — SIGNIFICANT CHANGE UP (ref 80–100)
MONOCYTES # BLD AUTO: 1.04 K/UL — HIGH (ref 0–0.9)
MONOCYTES NFR BLD AUTO: 7 % — SIGNIFICANT CHANGE UP (ref 2–14)
NEUTROPHILS # BLD AUTO: 9.4 K/UL — HIGH (ref 1.8–7.4)
NEUTROPHILS NFR BLD AUTO: 63.6 % — SIGNIFICANT CHANGE UP (ref 43–77)
NRBC # BLD: 0 /100 WBCS — SIGNIFICANT CHANGE UP (ref 0–0)
PHOSPHATE SERPL-MCNC: 5 MG/DL — HIGH (ref 2.5–4.5)
PLATELET # BLD AUTO: 479 K/UL — HIGH (ref 150–400)
POTASSIUM SERPL-MCNC: 4.8 MMOL/L — SIGNIFICANT CHANGE UP (ref 3.5–5.3)
POTASSIUM SERPL-MCNC: 5.6 MMOL/L — HIGH (ref 3.5–5.3)
POTASSIUM SERPL-SCNC: 4.8 MMOL/L — SIGNIFICANT CHANGE UP (ref 3.5–5.3)
POTASSIUM SERPL-SCNC: 5.6 MMOL/L — HIGH (ref 3.5–5.3)
PROT SERPL-MCNC: 7.5 G/DL — SIGNIFICANT CHANGE UP (ref 6–8.3)
RBC # BLD: 5.17 M/UL — SIGNIFICANT CHANGE UP (ref 3.8–5.2)
RBC # FLD: 15.2 % — HIGH (ref 10.3–14.5)
SODIUM SERPL-SCNC: 130 MMOL/L — LOW (ref 135–145)
SODIUM SERPL-SCNC: 133 MMOL/L — LOW (ref 135–145)
WBC # BLD: 14.79 K/UL — HIGH (ref 3.8–10.5)
WBC # FLD AUTO: 14.79 K/UL — HIGH (ref 3.8–10.5)

## 2023-03-27 PROCEDURE — 99285 EMERGENCY DEPT VISIT HI MDM: CPT | Mod: 25

## 2023-03-27 PROCEDURE — 80053 COMPREHEN METABOLIC PANEL: CPT

## 2023-03-27 PROCEDURE — 85730 THROMBOPLASTIN TIME PARTIAL: CPT

## 2023-03-27 PROCEDURE — 97161 PT EVAL LOW COMPLEX 20 MIN: CPT

## 2023-03-27 PROCEDURE — U0003: CPT

## 2023-03-27 PROCEDURE — 83036 HEMOGLOBIN GLYCOSYLATED A1C: CPT

## 2023-03-27 PROCEDURE — 85610 PROTHROMBIN TIME: CPT

## 2023-03-27 PROCEDURE — 82010 KETONE BODYS QUAN: CPT

## 2023-03-27 PROCEDURE — 86880 COOMBS TEST DIRECT: CPT

## 2023-03-27 PROCEDURE — 99239 HOSP IP/OBS DSCHRG MGMT >30: CPT | Mod: GC

## 2023-03-27 PROCEDURE — 86850 RBC ANTIBODY SCREEN: CPT

## 2023-03-27 PROCEDURE — 82962 GLUCOSE BLOOD TEST: CPT

## 2023-03-27 PROCEDURE — 86870 RBC ANTIBODY IDENTIFICATION: CPT

## 2023-03-27 PROCEDURE — C1769: CPT

## 2023-03-27 PROCEDURE — 36415 COLL VENOUS BLD VENIPUNCTURE: CPT

## 2023-03-27 PROCEDURE — 84100 ASSAY OF PHOSPHORUS: CPT

## 2023-03-27 PROCEDURE — 96375 TX/PRO/DX INJ NEW DRUG ADDON: CPT

## 2023-03-27 PROCEDURE — 85027 COMPLETE CBC AUTOMATED: CPT

## 2023-03-27 PROCEDURE — 99153 MOD SED SAME PHYS/QHP EA: CPT

## 2023-03-27 PROCEDURE — 82533 TOTAL CORTISOL: CPT

## 2023-03-27 PROCEDURE — 85025 COMPLETE CBC W/AUTO DIFF WBC: CPT

## 2023-03-27 PROCEDURE — 94640 AIRWAY INHALATION TREATMENT: CPT

## 2023-03-27 PROCEDURE — 83605 ASSAY OF LACTIC ACID: CPT

## 2023-03-27 PROCEDURE — U0005: CPT

## 2023-03-27 PROCEDURE — 99232 SBSQ HOSP IP/OBS MODERATE 35: CPT

## 2023-03-27 PROCEDURE — 87086 URINE CULTURE/COLONY COUNT: CPT

## 2023-03-27 PROCEDURE — 87637 SARSCOV2&INF A&B&RSV AMP PRB: CPT

## 2023-03-27 PROCEDURE — 74176 CT ABD & PELVIS W/O CONTRAST: CPT

## 2023-03-27 PROCEDURE — 51705 CHANGE OF BLADDER TUBE: CPT

## 2023-03-27 PROCEDURE — 74177 CT ABD & PELVIS W/CONTRAST: CPT | Mod: MA

## 2023-03-27 PROCEDURE — 87186 SC STD MICRODIL/AGAR DIL: CPT

## 2023-03-27 PROCEDURE — 80048 BASIC METABOLIC PNL TOTAL CA: CPT

## 2023-03-27 PROCEDURE — 83735 ASSAY OF MAGNESIUM: CPT

## 2023-03-27 PROCEDURE — 80170 ASSAY OF GENTAMICIN: CPT

## 2023-03-27 PROCEDURE — 84702 CHORIONIC GONADOTROPIN TEST: CPT

## 2023-03-27 PROCEDURE — C1729: CPT

## 2023-03-27 PROCEDURE — 96374 THER/PROPH/DIAG INJ IV PUSH: CPT

## 2023-03-27 PROCEDURE — 86901 BLOOD TYPING SEROLOGIC RH(D): CPT

## 2023-03-27 PROCEDURE — 83690 ASSAY OF LIPASE: CPT

## 2023-03-27 PROCEDURE — 82803 BLOOD GASES ANY COMBINATION: CPT

## 2023-03-27 PROCEDURE — 75984 XRAY CONTROL CATHETER CHANGE: CPT

## 2023-03-27 PROCEDURE — 86900 BLOOD TYPING SEROLOGIC ABO: CPT

## 2023-03-27 PROCEDURE — 87040 BLOOD CULTURE FOR BACTERIA: CPT

## 2023-03-27 PROCEDURE — 81001 URINALYSIS AUTO W/SCOPE: CPT

## 2023-03-27 PROCEDURE — 99152 MOD SED SAME PHYS/QHP 5/>YRS: CPT

## 2023-03-27 RX ORDER — IBUPROFEN 200 MG
1 TABLET ORAL
Qty: 21 | Refills: 0
Start: 2023-03-27 | End: 2023-04-02

## 2023-03-27 RX ORDER — LABETALOL HCL 100 MG
1 TABLET ORAL
Qty: 21 | Refills: 1
Start: 2023-03-27 | End: 2023-05-07

## 2023-03-27 RX ORDER — DIPHENHYDRAMINE HCL 50 MG
1 CAPSULE ORAL
Qty: 10 | Refills: 1
Start: 2023-03-27 | End: 2023-04-15

## 2023-03-27 RX ORDER — LOSARTAN POTASSIUM 100 MG/1
1 TABLET, FILM COATED ORAL
Qty: 21 | Refills: 1
Start: 2023-03-27 | End: 2023-05-07

## 2023-03-27 RX ORDER — DIPHENHYDRAMINE HCL 50 MG
25 CAPSULE ORAL ONCE
Refills: 0 | Status: COMPLETED | OUTPATIENT
Start: 2023-03-27 | End: 2023-03-27

## 2023-03-27 RX ORDER — ALBUTEROL 90 UG/1
2 AEROSOL, METERED ORAL
Qty: 1 | Refills: 0
Start: 2023-03-27

## 2023-03-27 RX ADMIN — MONTELUKAST 10 MILLIGRAM(S): 4 TABLET, CHEWABLE ORAL at 14:18

## 2023-03-27 RX ADMIN — INSULIN GLARGINE 30 UNIT(S): 100 INJECTION, SOLUTION SUBCUTANEOUS at 12:30

## 2023-03-27 RX ADMIN — Medication 6: at 17:38

## 2023-03-27 RX ADMIN — OXYCODONE HYDROCHLORIDE 20 MILLIGRAM(S): 5 TABLET ORAL at 06:25

## 2023-03-27 RX ADMIN — Medication 36 UNIT(S): at 09:25

## 2023-03-27 RX ADMIN — Medication 100 MILLIGRAM(S): at 12:09

## 2023-03-27 RX ADMIN — Medication 36 UNIT(S): at 12:29

## 2023-03-27 RX ADMIN — Medication 36 UNIT(S): at 17:37

## 2023-03-27 RX ADMIN — Medication 12: at 09:27

## 2023-03-27 RX ADMIN — LOSARTAN POTASSIUM 50 MILLIGRAM(S): 100 TABLET, FILM COATED ORAL at 06:25

## 2023-03-27 RX ADMIN — OXYCODONE HYDROCHLORIDE 20 MILLIGRAM(S): 5 TABLET ORAL at 12:09

## 2023-03-27 RX ADMIN — OXYCODONE HYDROCHLORIDE 20 MILLIGRAM(S): 5 TABLET ORAL at 07:25

## 2023-03-27 RX ADMIN — Medication 5 MILLIGRAM(S): at 09:16

## 2023-03-27 RX ADMIN — OXYCODONE HYDROCHLORIDE 20 MILLIGRAM(S): 5 TABLET ORAL at 13:00

## 2023-03-27 RX ADMIN — Medication 25 MILLIGRAM(S): at 14:17

## 2023-03-27 NOTE — PROGRESS NOTE ADULT - ASSESSMENT
43y yo Female  with PMH of HTN, HLD, Abdominal infection c/b Necrotizing fascitis (s/p suprapubic catheter and ostomy bag placement on 11/11/21 at [facility]) , who presents to the ED with concerns of fatigue and episodes of night sweats for last few weeks. Patient stated that she has been recently having concerns that she has been difficulty taking care of her ostomy bag and concerns for ostomy reversal and was hoping to get supplies and cares from the hospital. On workup CT Abdomen and Pelvis w/ Oral Cont and w/ IV Cont 4 cm right ovarian cyst, Pigtail suprapubic cystostomy catheter in place in the urinary  bladder. There is diffuse urinary bladder inflammation suggesting cystitis.      consulted for dysfunctional SPT. Per pt, she has been followed by Urologist associated with [facility] since SPT was originally placed 11/11/21 (does not recall name of Urologist). She has been getting SPT exchanged every month by IR 2/2 cannot tolerate bedside SPT exchange. Also states she need to be premedicated prior to procedure because she is allergic to contrast. Pt states 2 months ago, SPT was pulled out by doctor at [facility] to see if pt could urinate from urethra. Pt was able to urinate ok, and was sent home. Original SPT site closed and healed without complications. Pt states she preferred SPT 2/2 nonambulatory and has pain with urination so SPT was redone by IR at [facility] but new incision/insertion site was moved to left pubic area below ostomy site. Per pt, SPT was draining fine, but last night when nursing changed ostomy bag, SPT was pulled out. Of note, pt states she would like to establish care with a Urologist at [facility] since her other doctors are here. Since SPT became dislodged last night, pt has been urinating from urethra. Bladder scan 0cc.  + dysuria. Denies fevers, chills, nausea, vomiting, hematuria. Pt admitted to medicine for urosepsis. Pt is afebrile, hemodynamically stable. IR states they will upsize and change her SP tube tomorrow.     Plan:  -treatment UTI per primary team  -IR up size and replacement of SP tube tomorrow, pre-medicate for contrast allergy  -NPO @ [place], optimize for IR procedure tomorrow   -can follow up at  clinic within 1-2 weeks of discharge.  Call [phone] to schedule your appointment.  The office is located at [address] [place] [address], [place] [address]  -Discussed with  attending 
43y yo Female  with PMH of HTN, HLD, Abdominal infection c/b Necrotizing fascitis (s/p suprapubic catheter and ostomy bag placement on 11/11/21 at [facility]) , who presents to the ED with concerns of fatigue and episodes of night sweats for last few weeks. Patient stated that she has been recently having concerns that she has been difficulty taking care of her ostomy bag and concerns for ostomy reversal and was hoping to get supplies and cares from the hospital. On workup CT Abdomen and Pelvis w/ Oral Cont and w/ IV Cont 4 cm right ovarian cyst, Pigtail suprapubic cystostomy catheter in place in the urinary  bladder. There is diffuse urinary bladder inflammation suggesting cystitis.      consulted for dysfunctional SPT. Per pt, she has been followed by Urologist associated with [facility] since SPT was originally placed 11/11/21 (does not recall name of Urologist). She has been getting SPT exchanged every month by IR 2/2 cannot tolerate bedside SPT exchange. Also states she need to be premedicated prior to procedure because she is allergic to contrast. Pt states 2 months ago, SPT was pulled out by doctor at [facility] to see if pt could urinate from urethra. Pt was able to urinate ok, and was sent home. Original SPT site closed and healed without complications. Pt states she preferred SPT 2/2 nonambulatory and has pain with urination so SPT was redone by IR at [facility] but new incision/insertion site was moved to left pubic area below ostomy site. Per pt, SPT was draining fine, but last night when nursing changed ostomy bag, SPT was pulled out. Of note, pt states she would like to establish care with a Urologist at [facility] since her other doctors are here. Since SPT became dislodged last night, pt has been urinating from urethra. Bladder scan 0cc.  + dysuria. Denies fevers, chills, nausea, vomiting, hematuria. Pt admitted to medicine for urosepsis. Pt is afebrile, hemodynamically stable. Urine Cx growing K pneumoniae, geting gentamimicin. IR states they will upsize and change her SP tube today.     Plan:  -treatment UTI per primary team  -IR up size and replacement of SP tube today, pre-medicate for contrast allergy  -pain control   -can follow up at  clinic within 1-2 weeks of discharge.  Call [phone] to schedule your appointment.  The office is located at [address] [place] [address], [place] [address]  -Discussed with  attending   
Ms. Katz is a 43 year old woman with history of poorly controlled type 2 diabetes mellitus on high doses of U500 insulin outpatient, abdominal infection complicated by necrotizing fasciitis, s/p suprapubic catheter, ostomy bag placement who was admitted for complicated UTI/sepsis  
43y yo Female  with PMH of HTN, HLD, Abdominal infection c/b Necrotizing fascitis (s/p suprapubic catheter and ostomy bag placement on 11/11/21 at [facility]) , admitted for sepsis from urinary source     consulted for dysfunctional SPT. SPT placed 11/11/21 (followed with urologist at [facility]). SPT exchanged q monthly by IR b/c cannot tolerate bedside SPT exchange. 2 months ago, SPT was pulled out by doctor at [facility] for TOV, initially passed however prefers SPT due to dysuria and nonambulatory status. Original SPT site closed and healed. IR at [facility] placed another SPT to LLQ below ostomy site, which was pulled when nursing changed ostomy bag. Wants to follow with [facility] urology. Now S/p IR placement of 12 Fr SPT 3/24 on RLQ, draining fine    Plan:  -treatment UTI per primary team  -no acute  interventions at this time  -care per primary team  -patient still complaining of itching, would differ allergy treatment to primary team  -pain control   -can follow up at  clinic within 1-2 weeks of discharge.  Call [phone] to schedule your appointment.  The office is located at [address] [place] [address], [address], [place] [address]  -Discussed with  attending 
Ms. Katz is a 43 year old woman with history of poorly controlled type 2 diabetes mellitus on insulin (outpt endo Dr. Nisha Joseph, last seen Feb 2023), abdominal infection complicate dby necrotizing fasciitis, s/p suprapubic catheter, ostomy bag placement who was admitted for complicated UTI for whom endocrine is consulted for hyperglycemia.     A1C: 12.5 %  BUN: 12 mg/dL  Creatinine: 0.65 mg/dL  GFR: 112 mL/min/1.73m2  Weight (kg): 99.8  BMI (kg/m2): 34.5
Ms. Katz is a 43 year old woman with history of poorly controlled type 2 diabetes mellitus on insulin (outpt endo Dr. Nisha Joseph, last seen Feb 2023), abdominal infection complicate dby necrotizing fasciitis, s/p suprapubic catheter, ostomy bag placement who was admitted for complicated UTI for whom endocrine is consulted for hyperglycemia.     A1C: 12.5 %  BUN: 12 mg/dL  Creatinine: 0.65 mg/dL  GFR: 112 mL/min/1.73m2  Weight (kg): 99.8  BMI (kg/m2): 34.5
HASMUKH AGUILERA [id number] is a 43y yo Female  with PMH of HTN, HLD, Abdominal infection c/b Necrotizing fascitis (s/p suprapubic catheter and ostomy bag placement on 11/11/21 at [facility]) , who presents to the ED with concerns of fatigue and episodes of night sweats for last few weeks. Patient is being admitted to [facility] for further management of severe sepsis and Ostomy care. HDS, plan for SPC exchange today.
Ms. Katz is a 43 year old woman with history of poorly controlled type 2 diabetes mellitus on insulin (outpt endo Dr. Nisha Joseph, last seen Feb 2023), abdominal infection complicate dby necrotizing fasciitis, s/p suprapubic catheter, ostomy bag placement who was admitted for complicated UTI for whom endocrine is consulted for hyperglycemia.     A1C: 12.5 %  BUN: 36 mg/dL  Creatinine: 0.90 mg/dL  GFR: 81 mL/min/1.73m2  Weight (kg): 99.8  BMI (kg/m2): 34.5
HASMUKH AGUILERA [id number] is a 43y yo Female  with PMH of HTN, HLD, Abdominal infection c/b Necrotizing fascitis (s/p suprapubic catheter and ostomy bag placement on 11/11/21 at [facility]) , who presents to the ED with concerns of fatigue and episodes of night sweats for last few weeks. Patient is being admitted to [facility] for further management of severe sepsis and Ostomy care. 
Ms. Katz is a 43 year old woman with history of poorly controlled type 2 diabetes mellitus on high doses of U500 insulin outpatient, abdominal infection complicated by necrotizing fasciitis, s/p suprapubic catheter, ostomy bag placement who was admitted for complicated UTI/sepsis
Ms. Katz is a 43 year old woman with history of poorly controlled type 2 diabetes mellitus on insulin (outpt endo Dr. Nisha Joseph, last seen Feb 2023), abdominal infection complicate dby necrotizing fasciitis, s/p suprapubic catheter, ostomy bag placement who was admitted for complicated UTI for whom endocrine is consulted for hyperglycemia.     A1C: 12.5 %  BUN: 9 mg/dL  Creatinine: 0.61 mg/dL  GFR: 114 mL/min/1.73m2  Weight (kg): 99.8  BMI (kg/m2): 34.5
42 y/o F w/
HASMUKH AGUILERA [id number] is a 43y yo Female  with PMH of HTN, HLD, Abdominal infection c/b Necrotizing fascitis (s/p suprapubic catheter and ostomy bag placement on 11/11/21 at [facility]) , who presents to the ED with concerns of fatigue and episodes of night sweats for last few weeks. Patient is being admitted to [facility] for further management of severe sepsis and Ostomy care. 
HASMUKH AGUILERA [id number] is a 43y yo Female  with PMH of HTN, HLD, Abdominal infection c/b Necrotizing fascitis (s/p suprapubic catheter and ostomy bag placement on 11/11/21 at [facility]) , who presents to the ED with concerns of fatigue and episodes of night sweats for last few weeks. Patient is being admitted to [email or web address] for further management of severe sepsis and Ostomy care. HDS, ongoing discussion surrounding suprapubic catheter exchange vs not.
HASMUKH AGUILERA [id number] is a 43y yo Female  with PMH of HTN, HLD, Abdominal infection c/b Necrotizing fascitis (s/p suprapubic catheter and ostomy bag placement on 11/11/21 at [facility]) , who presents to the ED with concerns of fatigue and episodes of night sweats for last few weeks. Patient is being admitted to [facility] for further management of severe sepsis and Ostomy care. S/p Abx, anticipated discharge 3/26. HDS.
44 y/o F w/
HASMUKH AGUILERA [id number] is a 43y yo Female  with PMH of HTN, HLD, Abdominal infection c/b Necrotizing fascitis (s/p suprapubic catheter and ostomy bag placement on 11/11/21 at [facility]) , who presents to the ED with concerns of fatigue and episodes of night sweats for last few weeks. Patient is being admitted to [facility] for further management of severe sepsis and Ostomy care. S/p Abx, anticipated discharge 3/26. HDS.
HASMUKH AGUILERA [id number] is a 43y yo Female  with PMH of HTN, HLD, Abdominal infection c/b Necrotizing fascitis (s/p suprapubic catheter and ostomy bag placement on 11/11/21 at [facility]) , who presents to the ED with concerns of fatigue and episodes of night sweats for last few weeks. Patient is being admitted to [facility] for further management of severe sepsis and Ostomy care. Severe elevated FSG overnight and this AM likely 2/2 to solucortef premedication prior to SPC exhcange y/day, improving now. HDS.

## 2023-03-27 NOTE — PROGRESS NOTE ADULT - PROBLEM SELECTOR PLAN 2
HbA1c 12.5%, not at goal; cont. insulin regimen per Endocrine recs, monitor blood glucose and anion gap; cont. diabetic diet

## 2023-03-27 NOTE — PROGRESS NOTE ADULT - PROBLEM SELECTOR PLAN 1
severe sepsis POA, due to SPT POA, due to MDR Klebsiella and E. faecalis; ID and Urology following; clinically resolved s/p gent, per ID recs, dosed per level; s/p IR SPT exchange and upsize 3/23; cont. contact precautions

## 2023-03-27 NOTE — PROGRESS NOTE ADULT - PROBLEM SELECTOR PLAN 3
I-STOP reviewed; cont. oxycodone q4-6h PRN (10mg if moderate, 20mg if severe); can give IV Dilaudid if needed for breakthrough pain; I wrote rx for oxycodone 10mg

## 2023-03-27 NOTE — PROGRESS NOTE ADULT - TIME BILLING
Insulin adjustments
Review of glycemic data, nutrition discussed. Anticipated discharge instructions discussed with patient and attending who was present for the visit.  Dex suppression for [facility]'s screening
coordination of care  d/c planning  d/w Medicine residents
coordination of care  d/c planning  d/w Medicine residents

## 2023-03-27 NOTE — PROGRESS NOTE ADULT - SUBJECTIVE AND OBJECTIVE BOX
Patient is a 43y old  Female who presents with a chief complaint of Urosepsis (27 Mar 2023 17:23)      INTERVAL HPI/OVERNIGHT EVENTS:    Pt. seen and examined earlier today  Pt. c/o chronic pain, well-controlled w/ oxycodone  Pt. c/o itching relieved w/ Benadryl  Pt. reports increased energy/mobility; OOB to chair  Pt. denies F/C, CP, SOB, lightheadedness    Review of Systems: 12 point review of systems otherwise negative    MEDICATIONS  (STANDING):  acetaminophen     Tablet .. 975 milliGRAM(s) Oral every 8 hours  dextrose 5%. 1000 milliLiter(s) (100 mL/Hr) IV Continuous <Continuous>  dextrose 5%. 1000 milliLiter(s) (50 mL/Hr) IV Continuous <Continuous>  dextrose 50% Injectable 25 Gram(s) IV Push once  dextrose 50% Injectable 12.5 Gram(s) IV Push once  dextrose 50% Injectable 25 Gram(s) IV Push once  enoxaparin Injectable 40 milliGRAM(s) SubCutaneous every 24 hours  glucagon  Injectable 1 milliGRAM(s) IntraMuscular once  insulin glargine Injectable (LANTUS) 30 Unit(s) SubCutaneous every morning  insulin glargine Injectable (LANTUS) 60 Unit(s) SubCutaneous at bedtime  insulin lispro (ADMELOG) corrective regimen sliding scale   SubCutaneous Before meals and at bedtime  insulin lispro Injectable (ADMELOG) 36 Unit(s) SubCutaneous three times a day before meals  labetalol 100 milliGRAM(s) Oral every 24 hours  losartan 50 milliGRAM(s) Oral daily  montelukast 10 milliGRAM(s) Oral every 24 hours  oxybutynin 5 milliGRAM(s) Oral every 8 hours    MEDICATIONS  (PRN):  albuterol/ipratropium for Nebulization 3 milliLiter(s) Nebulizer every 6 hours PRN Shortness of Breath and/or Wheezing  benzocaine/menthol Lozenge 1 Lozenge Oral every 8 hours PRN Sore Throat  benzonatate 100 milliGRAM(s) Oral every 8 hours PRN Cough  dextrose Oral Gel 15 Gram(s) Oral once PRN Blood Glucose LESS THAN 70 milliGRAM(s)/deciliter  oxyCODONE    IR 20 milliGRAM(s) Oral every 4 hours PRN Severe Pain (7 - 10)  oxyCODONE    IR 10 milliGRAM(s) Oral every 4 hours PRN Moderate Pain (4 - 6)      Allergies    amoxicillin (Unknown)  clindamycin (Pruritus)  fish (Hives; Urticaria)  iodine containing compounds (Hives; Anaphylaxis)  penicillin (Hives)  shellfish. (Hives; Anaphylaxis)    Intolerances    Bactrim I.V. (Rash (Mod to Severe))        Vital Signs Last 24 Hrs  T(C): 36.7 (27 Mar 2023 16:34), Max: 36.9 (26 Mar 2023 20:58)  T(F): 98 (27 Mar 2023 16:34), Max: 98.5 (26 Mar 2023 20:58)  HR: 90 (27 Mar 2023 16:34) (74 - 101)  BP: 108/71 (27 Mar 2023 16:34) (108/71 - 134/78)  BP(mean): --  RR: 17 (27 Mar 2023 16:34) (17 - 18)  SpO2: 97% (27 Mar 2023 16:34) (95% - 98%)    Parameters below as of 27 Mar 2023 16:34  Patient On (Oxygen Delivery Method): room air      CAPILLARY BLOOD GLUCOSE      POCT Blood Glucose.: 241 mg/dL (27 Mar 2023 17:34)  POCT Blood Glucose.: 145 mg/dL (27 Mar 2023 12:27)  POCT Blood Glucose.: 282 mg/dL (27 Mar 2023 09:19)  POCT Blood Glucose.: 214 mg/dL (26 Mar 2023 22:01)      03-26 @ 07:01 - 03-27 @ 07:00  --------------------------------------------------------  IN: 0 mL / OUT: 1400 mL / NET: -1400 mL    03-27 @ 07:01  -  03-27 @ 18:47  --------------------------------------------------------  IN: 460 mL / OUT: 800 mL / NET: -340 mL        Physical Exam:  (earlier today)  Daily     Daily   General:  non-toxic appearing in NAD, sitting in a chair  HEENT:  MMM  CV:  RRR  Lungs:  no wheezing/stridor  Abdomen:  soft NT ND +ostomy  Extremities:  no edema B/L LE  Skin:  WWP, no hives  :  +SPT  Neuro:  AAOx3    LABS:                        12.4   14.79 )-----------( 479      ( 27 Mar 2023 05:30 )             41.7     03-27    130<L>  |  99  |  38<H>  ----------------------------<  242<H>  4.8   |  20<L>  |  1.02    Ca    9.0      27 Mar 2023 17:09  Phos  5.0     03-27  Mg     2.0     03-27    TPro  7.5  /  Alb  3.6  /  TBili  <0.2  /  DBili  x   /  AST  12  /  ALT  11  /  AlkPhos  115  03-27

## 2023-03-27 NOTE — PROGRESS NOTE ADULT - NSPROGADDITIONALINFOA_GEN_ALL_CORE
DVT ppx: LMWH  Dispo: d/c home w/ outpatient PCP, Urology, and Endocrine f/u
DVT ppx: LMWH held for IR procedure  Dispo: home pending completion of abx (gentamycin), improved glucose control  will need home care services re: ostomy, SPT

## 2023-03-27 NOTE — PROGRESS NOTE ADULT - PROBLEM SELECTOR PLAN 1
- Please give Lantus 60 units at bedtime and Lantus 30 units in the AM.   - Continue lispro  to 36 units before each meal.  - Continue Lispro high dose insulin correctional scale moderate scale until steroid effect wears off, and then change back to MISS.  - Patient's fingerstick glucose goal is 100-180 mg/dL.      For discharge: Her private endocrinologist, Dr Joseph, was on service this weekend. Recommending her to continue Humulin U500 160 units TID and stop admelog unless 2hr PP FSG >200, and then take 35 units.  - Patient can follow up at discharge with Dr. Joseph at [facility] by calling [phone] to make an appointment.      Case discussed with Dr. Bowden. Primary team updated.

## 2023-03-27 NOTE — DISCHARGE NOTE NURSING/CASE MANAGEMENT/SOCIAL WORK - PATIENT PORTAL LINK FT
You can access the FollowMyHealth Patient Portal offered by [facility] by registering at the following website: [email or web address]. By joining [email or web address] FollowMyHealth portal, you will also be able to view your health information using other applications (apps) compatible with our system.

## 2023-03-27 NOTE — PHYSICAL THERAPY INITIAL EVALUATION ADULT - PERTINENT HX OF CURRENT PROBLEM, REHAB EVAL
43F who presents to the ED with concerns of fatigue and episodes of night sweats for last few weeks. Patient stated that she has been recently having concerns that she has been difficulty taking care of her ostomy bag and concerns for ostomy reversal and was hoping to get supplies and cares from the hospital.

## 2023-03-27 NOTE — PROGRESS NOTE ADULT - SUBJECTIVE AND OBJECTIVE BOX
OVERNIGHT: No acute events overnight.   SUBJECTIVE: Patient was seen and examined this morning. Events over weekend reviewed. Insulin increased. Her private endocrinologist, Dr Joseph, was on service this weekend. Recommending her to continue Humulin U500 160 units TID and stop admelog unless PP FSG >200, and then take 35 units.    Diet:  Dinner - ate late around 11PM. Rice and veggies.  Breakfast - 2 yogurt parfait      CAPILLARY BLOOD GLUCOSE & INSULIN RECEIVED  145 mg/dL (03-27 @ 12:27) -   282 mg/dL (03-27 @ 09:19) - Lispro 36+12 and Lantus 30  214 mg/dL (03-26 @ 22:01) - Lantus 60 and lispro 6  200 mg/dL (03-26 @ 17:24) - Lispro 36+3    REVIEW OF SYSTEMS  Constitutional:  (+) Decreased appetite. Negative fever, chills.  Cardiovascular:  Negative for chest pain or palpitations.  Respiratory:  Negative for cough, wheezing, or shortness of breath.    Gastrointestinal:  (+) Abdominal pain and nausea, Negative for vomiting, diarrhea, constipation.  Neurologic:  No headache, confusion, dizziness, lightheadedness.     PHYSICAL EXAM  Vital Signs Last 24 Hrs  T(C): 36.7 (27 Mar 2023 16:34), Max: 36.9 (26 Mar 2023 20:58)  T(F): 98 (27 Mar 2023 16:34), Max: 98.5 (26 Mar 2023 20:58)  HR: 90 (27 Mar 2023 16:34) (74 - 101)  BP: 108/71 (27 Mar 2023 16:34) (108/71 - 134/78)  BP(mean): --  RR: 17 (27 Mar 2023 16:34) (17 - 18)  SpO2: 97% (27 Mar 2023 16:34) (95% - 98%)    Parameters below as of 27 Mar 2023 16:34  Patient On (Oxygen Delivery Method): room air    Constitutional: Awake, alert, female, in no acute distress. OBese.  HEENT: Normocephalic, atraumatic, NAOMI.  Respiratory: Lungs clear to ausculation bilaterally.   Cardiovascular: regular rhythm, normal S1 and S2, no audible murmurs.   GI: soft, mildly tender, non-distended, (+) Ostomy. (+) Suprapubic catheter.   Extremities: No lower extremity edema.  Psychiatric: AAO x 3. Normal affect/mood. .     LABS  CBC - WBC/HGB/HTC/PLT: 14.79/12.4/41.7/479 (03-27-23)  BMP - Na/K/Cl/Bicarb/BUN/Cr/Gluc: 133/5.6/98/24/36/0.90/277 (03-27-23)  Anion Gap: 11 (03-27-23)  eGFR: 81 (03-27-23)  Calcium: 9.5 (03-27-23)  Phosphorus: 5.0 (03-27-23)  Magnesium: 2.0 (03-27-23)  LFT - Alb/Tprot/Tbili/Dbili/AlkPhos/ALT/AST: 3.6/--/<0.2/--/115/11/12 (03-27-23)  PT/aPTT/INR: 12.1/29.5/1.02 (03-22-23)         MEDICATIONS  MEDICATIONS  (STANDING):  acetaminophen     Tablet .. 975 milliGRAM(s) Oral every 8 hours  dextrose 5%. 1000 milliLiter(s) (100 mL/Hr) IV Continuous <Continuous>  dextrose 5%. 1000 milliLiter(s) (50 mL/Hr) IV Continuous <Continuous>  dextrose 50% Injectable 25 Gram(s) IV Push once  dextrose 50% Injectable 12.5 Gram(s) IV Push once  dextrose 50% Injectable 25 Gram(s) IV Push once  enoxaparin Injectable 40 milliGRAM(s) SubCutaneous every 24 hours  glucagon  Injectable 1 milliGRAM(s) IntraMuscular once  insulin glargine Injectable (LANTUS) 30 Unit(s) SubCutaneous every morning  insulin glargine Injectable (LANTUS) 60 Unit(s) SubCutaneous at bedtime  insulin lispro (ADMELOG) corrective regimen sliding scale   SubCutaneous Before meals and at bedtime  insulin lispro Injectable (ADMELOG) 36 Unit(s) SubCutaneous three times a day before meals  labetalol 100 milliGRAM(s) Oral every 24 hours  losartan 50 milliGRAM(s) Oral daily  montelukast 10 milliGRAM(s) Oral every 24 hours  oxybutynin 5 milliGRAM(s) Oral every 8 hours    MEDICATIONS  (PRN):  albuterol/ipratropium for Nebulization 3 milliLiter(s) Nebulizer every 6 hours PRN Shortness of Breath and/or Wheezing  benzocaine/menthol Lozenge 1 Lozenge Oral every 8 hours PRN Sore Throat  benzonatate 100 milliGRAM(s) Oral every 8 hours PRN Cough  dextrose Oral Gel 15 Gram(s) Oral once PRN Blood Glucose LESS THAN 70 milliGRAM(s)/deciliter  oxyCODONE    IR 10 milliGRAM(s) Oral every 4 hours PRN Moderate Pain (4 - 6)  oxyCODONE    IR 20 milliGRAM(s) Oral every 4 hours PRN Severe Pain (7 - 10)

## 2023-03-27 NOTE — PROGRESS NOTE ADULT - REASON FOR ADMISSION
Urosepsis

## 2023-03-27 NOTE — DISCHARGE NOTE NURSING/CASE MANAGEMENT/SOCIAL WORK - NSDCFUADDAPPT_GEN_ALL_CORE_FT
Please bring your Insurance card, Photo ID and Discharge paperwork to the following appointment:    (1) Please follow up with your Urology Provider, Dr. Pelon Louis at [address] [place] [address], [place] [address] on 04/10/2023 at 11:00am.    Appointment was scheduled by Ms. NAGI Tam, Referral Coordinator.    2. - Can follow up at discharge with Dr. Joseph at [facility] by calling [phone] to make an appointment.

## 2023-03-27 NOTE — PROGRESS NOTE ADULT - PROBLEM SELECTOR PROBLEM 1
Catheter-associated urinary tract infection
Diabetes
Diabetes
Severe sepsis
Severe sepsis
Diabetes
Severe sepsis
Catheter-associated urinary tract infection
Severe sepsis

## 2023-03-27 NOTE — PROGRESS NOTE ADULT - PROVIDER SPECIALTY LIST ADULT
Endocrinology
Internal Medicine
Infectious Disease
Internal Medicine
Urology
Endocrinology
Endocrinology
Urology
Endocrinology
Urology
Endocrinology
Hospitalist
Hospitalist
Internal Medicine
Hospitalist

## 2023-03-30 DIAGNOSIS — A41.9 SEPSIS, UNSPECIFIED ORGANISM: ICD-10-CM

## 2023-03-30 DIAGNOSIS — B95.2 ENTEROCOCCUS AS THE CAUSE OF DISEASES CLASSIFIED ELSEWHERE: ICD-10-CM

## 2023-03-30 DIAGNOSIS — I10 ESSENTIAL (PRIMARY) HYPERTENSION: ICD-10-CM

## 2023-03-30 DIAGNOSIS — T83.518A INFECTION AND INFLAMMATORY REACTION DUE TO OTHER URINARY CATHETER, INITIAL ENCOUNTER: ICD-10-CM

## 2023-03-30 DIAGNOSIS — N39.0 URINARY TRACT INFECTION, SITE NOT SPECIFIED: ICD-10-CM

## 2023-03-30 DIAGNOSIS — Z91.041 RADIOGRAPHIC DYE ALLERGY STATUS: ICD-10-CM

## 2023-03-30 DIAGNOSIS — Z88.0 ALLERGY STATUS TO PENICILLIN: ICD-10-CM

## 2023-03-30 DIAGNOSIS — E11.65 TYPE 2 DIABETES MELLITUS WITH HYPERGLYCEMIA: ICD-10-CM

## 2023-03-30 DIAGNOSIS — Z88.1 ALLERGY STATUS TO OTHER ANTIBIOTIC AGENTS STATUS: ICD-10-CM

## 2023-03-30 DIAGNOSIS — Z91.013 ALLERGY TO SEAFOOD: ICD-10-CM

## 2023-03-30 DIAGNOSIS — B96.1 KLEBSIELLA PNEUMONIAE [K. PNEUMONIAE] AS THE CAUSE OF DISEASES CLASSIFIED ELSEWHERE: ICD-10-CM

## 2023-03-30 DIAGNOSIS — E78.5 HYPERLIPIDEMIA, UNSPECIFIED: ICD-10-CM

## 2023-03-30 DIAGNOSIS — E66.9 OBESITY, UNSPECIFIED: ICD-10-CM

## 2023-03-30 DIAGNOSIS — Z93.3 COLOSTOMY STATUS: ICD-10-CM

## 2023-04-03 ENCOUNTER — EMERGENCY (EMERGENCY)
Facility: HOSPITAL | Age: 43
LOS: 1 days | Discharge: HOME | End: 2023-04-03
Attending: STUDENT IN AN ORGANIZED HEALTH CARE EDUCATION/TRAINING PROGRAM | Admitting: STUDENT IN AN ORGANIZED HEALTH CARE EDUCATION/TRAINING PROGRAM
Payer: MEDICAID

## 2023-04-03 VITALS
HEIGHT: 67 IN | WEIGHT: 220.02 LBS | SYSTOLIC BLOOD PRESSURE: 162 MMHG | HEART RATE: 114 BPM | OXYGEN SATURATION: 95 % | RESPIRATION RATE: 18 BRPM | TEMPERATURE: 99 F | DIASTOLIC BLOOD PRESSURE: 92 MMHG

## 2023-04-03 DIAGNOSIS — Z87.19 PERSONAL HISTORY OF OTHER DISEASES OF THE DIGESTIVE SYSTEM: ICD-10-CM

## 2023-04-03 DIAGNOSIS — Z98.89 OTHER SPECIFIED POSTPROCEDURAL STATES: Chronic | ICD-10-CM

## 2023-04-03 DIAGNOSIS — R10.30 LOWER ABDOMINAL PAIN, UNSPECIFIED: ICD-10-CM

## 2023-04-03 DIAGNOSIS — Z91.013 ALLERGY TO SEAFOOD: ICD-10-CM

## 2023-04-03 DIAGNOSIS — Z88.0 ALLERGY STATUS TO PENICILLIN: ICD-10-CM
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
## 2023-04-03 DIAGNOSIS — Z88.1 ALLERGY STATUS TO OTHER ANTIBIOTIC AGENTS STATUS: ICD-10-CM

## 2023-04-03 DIAGNOSIS — Z93.3 COLOSTOMY STATUS: ICD-10-CM
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
## 2023-04-03 DIAGNOSIS — Z98.890 OTHER SPECIFIED POSTPROCEDURAL STATES: Chronic | ICD-10-CM

## 2023-04-03 DIAGNOSIS — Z79.4 LONG TERM (CURRENT) USE OF INSULIN: ICD-10-CM

## 2023-04-03 DIAGNOSIS — L29.9 PRURITUS, UNSPECIFIED: ICD-10-CM

## 2023-04-03 DIAGNOSIS — E78.5 HYPERLIPIDEMIA, UNSPECIFIED: ICD-10-CM

## 2023-04-03 DIAGNOSIS — Z87.448 PERSONAL HISTORY OF OTHER DISEASES OF URINARY SYSTEM: ICD-10-CM

## 2023-04-03 DIAGNOSIS — Z86.19 PERSONAL HISTORY OF OTHER INFECTIOUS AND PARASITIC DISEASES: ICD-10-CM

## 2023-04-03 DIAGNOSIS — I10 ESSENTIAL (PRIMARY) HYPERTENSION: ICD-10-CM

## 2023-04-03 DIAGNOSIS — R11.0 NAUSEA: ICD-10-CM

## 2023-04-03 DIAGNOSIS — Z91.041 RADIOGRAPHIC DYE ALLERGY STATUS: ICD-10-CM

## 2023-04-03 DIAGNOSIS — Z87.42 PERSONAL HISTORY OF OTHER DISEASES OF THE FEMALE GENITAL TRACT: ICD-10-CM

## 2023-04-03 DIAGNOSIS — Z87.59 PERSONAL HISTORY OF OTHER COMPLICATIONS OF PREGNANCY, CHILDBIRTH AND THE PUERPERIUM: ICD-10-CM

## 2023-04-03 DIAGNOSIS — R50.9 FEVER, UNSPECIFIED: ICD-10-CM

## 2023-04-03 DIAGNOSIS — E11.9 TYPE 2 DIABETES MELLITUS WITHOUT COMPLICATIONS: ICD-10-CM

## 2023-04-03 DIAGNOSIS — I26.99 OTHER PULMONARY EMBOLISM WITHOUT ACUTE COR PULMONALE: ICD-10-CM

## 2023-04-03 DIAGNOSIS — G89.18 OTHER ACUTE POSTPROCEDURAL PAIN: ICD-10-CM

## 2023-04-03 DIAGNOSIS — Z20.822 CONTACT WITH AND (SUSPECTED) EXPOSURE TO COVID-19: ICD-10-CM

## 2023-04-03 PROCEDURE — 99285 EMERGENCY DEPT VISIT HI MDM: CPT

## 2023-04-03 NOTE — ED ADULT TRIAGE NOTE - LOCATION:
Caller: Vaishali  Consult for : Chanelle   Referred by: Barbara Heller NP  Hospital : [facility]  Room number:    Admit date: 1/26/2020  Diagnosis: Right shoulder pain, recent fall   RN for the patient: Mitesh        RN phone #: [phone]    What was the method of communication with the provider or office?: Page and staff message.  
Left arm;

## 2023-04-03 NOTE — ED ADULT TRIAGE NOTE - CHIEF COMPLAINT QUOTE
s/p suprapubic catheter insertion about 12 days ago, follow up with her PMD today  and sent here for further evaluation of the area of suprapubic cath possible infections also pt complains of abdominal pain

## 2023-04-04 VITALS
DIASTOLIC BLOOD PRESSURE: 91 MMHG | OXYGEN SATURATION: 99 % | RESPIRATION RATE: 18 BRPM | SYSTOLIC BLOOD PRESSURE: 153 MMHG | HEART RATE: 106 BPM

## 2023-04-04 DIAGNOSIS — R10.2 PELVIC AND PERINEAL PAIN: ICD-10-CM

## 2023-04-04 LAB
ALBUMIN SERPL ELPH-MCNC: 3.6 G/DL — SIGNIFICANT CHANGE UP (ref 3.3–5)
ALP SERPL-CCNC: 155 U/L — HIGH (ref 40–120)
ALT FLD-CCNC: 14 U/L — SIGNIFICANT CHANGE UP (ref 10–45)
ANION GAP SERPL CALC-SCNC: 13 MMOL/L — SIGNIFICANT CHANGE UP (ref 5–17)
APPEARANCE UR: ABNORMAL
AST SERPL-CCNC: 13 U/L — SIGNIFICANT CHANGE UP (ref 10–40)
BACTERIA # UR AUTO: PRESENT /HPF
BILIRUB SERPL-MCNC: 0.2 MG/DL — SIGNIFICANT CHANGE UP (ref 0.2–1.2)
BILIRUB UR-MCNC: NEGATIVE — SIGNIFICANT CHANGE UP
BUN SERPL-MCNC: 12 MG/DL — SIGNIFICANT CHANGE UP (ref 7–23)
CALCIUM SERPL-MCNC: 9.1 MG/DL — SIGNIFICANT CHANGE UP (ref 8.4–10.5)
CHLORIDE SERPL-SCNC: 97 MMOL/L — SIGNIFICANT CHANGE UP (ref 96–108)
CO2 SERPL-SCNC: 25 MMOL/L — SIGNIFICANT CHANGE UP (ref 22–31)
COLOR SPEC: YELLOW — SIGNIFICANT CHANGE UP
COMMENT - URINE: SIGNIFICANT CHANGE UP
CORTICOSTEROID BINDING GLOBULIN RESULT: 2.5 MG/DL — SIGNIFICANT CHANGE UP
CORTIS F/TOTAL MFR SERPL: 2.3 % — SIGNIFICANT CHANGE UP
CORTIS SERPL-MCNC: 1.6 UG/DL — LOW
CORTISOL, FREE RESULT: 0.04 UG/DL — LOW
CREAT SERPL-MCNC: 0.71 MG/DL — SIGNIFICANT CHANGE UP (ref 0.5–1.3)
DIFF PNL FLD: ABNORMAL
EGFR: 108 ML/MIN/1.73M2 — SIGNIFICANT CHANGE UP
EPI CELLS # UR: SIGNIFICANT CHANGE UP /HPF (ref 0–5)
GLUCOSE SERPL-MCNC: 439 MG/DL — HIGH (ref 70–99)
GLUCOSE UR QL: >=1000
HCT VFR BLD CALC: 40.1 % — SIGNIFICANT CHANGE UP (ref 34.5–45)
HGB BLD-MCNC: 12.2 G/DL — SIGNIFICANT CHANGE UP (ref 11.5–15.5)
KETONES UR-MCNC: NEGATIVE — SIGNIFICANT CHANGE UP
LEUKOCYTE ESTERASE UR-ACNC: ABNORMAL
MCHC RBC-ENTMCNC: 24.1 PG — LOW (ref 27–34)
MCHC RBC-ENTMCNC: 30.4 GM/DL — LOW (ref 32–36)
MCV RBC AUTO: 79.2 FL — LOW (ref 80–100)
NITRITE UR-MCNC: POSITIVE
NRBC # BLD: 0 /100 WBCS — SIGNIFICANT CHANGE UP (ref 0–0)
PH UR: 6 — SIGNIFICANT CHANGE UP (ref 5–8)
PLATELET # BLD AUTO: 411 K/UL — HIGH (ref 150–400)
POTASSIUM SERPL-MCNC: 4.1 MMOL/L — SIGNIFICANT CHANGE UP (ref 3.5–5.3)
POTASSIUM SERPL-SCNC: 4.1 MMOL/L — SIGNIFICANT CHANGE UP (ref 3.5–5.3)
PROT SERPL-MCNC: 7.2 G/DL — SIGNIFICANT CHANGE UP (ref 6–8.3)
PROT UR-MCNC: 100 MG/DL
RBC # BLD: 5.06 M/UL — SIGNIFICANT CHANGE UP (ref 3.8–5.2)
RBC # FLD: 15.2 % — HIGH (ref 10.3–14.5)
RBC CASTS # UR COMP ASSIST: > 10 /HPF
SARS-COV-2 RNA SPEC QL NAA+PROBE: SIGNIFICANT CHANGE UP
SODIUM SERPL-SCNC: 135 MMOL/L — SIGNIFICANT CHANGE UP (ref 135–145)
SP GR SPEC: >=1.03 — SIGNIFICANT CHANGE UP (ref 1–1.03)
UROBILINOGEN FLD QL: 0.2 E.U./DL — SIGNIFICANT CHANGE UP
WBC # BLD: 12.59 K/UL — HIGH (ref 3.8–10.5)
WBC # FLD AUTO: 12.59 K/UL — HIGH (ref 3.8–10.5)
WBC UR QL: > 10 /HPF

## 2023-04-04 PROCEDURE — G1004: CPT

## 2023-04-04 PROCEDURE — 99284 EMERGENCY DEPT VISIT MOD MDM: CPT | Mod: 25

## 2023-04-04 PROCEDURE — 96375 TX/PRO/DX INJ NEW DRUG ADDON: CPT | Mod: XU

## 2023-04-04 PROCEDURE — 85027 COMPLETE CBC AUTOMATED: CPT

## 2023-04-04 PROCEDURE — 87086 URINE CULTURE/COLONY COUNT: CPT

## 2023-04-04 PROCEDURE — 74177 CT ABD & PELVIS W/CONTRAST: CPT | Mod: 26,MA

## 2023-04-04 PROCEDURE — 82962 GLUCOSE BLOOD TEST: CPT

## 2023-04-04 PROCEDURE — 71275 CT ANGIOGRAPHY CHEST: CPT | Mod: 26,MG

## 2023-04-04 PROCEDURE — 96374 THER/PROPH/DIAG INJ IV PUSH: CPT | Mod: XU

## 2023-04-04 PROCEDURE — 87186 SC STD MICRODIL/AGAR DIL: CPT

## 2023-04-04 PROCEDURE — 81001 URINALYSIS AUTO W/SCOPE: CPT

## 2023-04-04 PROCEDURE — 87635 SARS-COV-2 COVID-19 AMP PRB: CPT

## 2023-04-04 PROCEDURE — 96376 TX/PRO/DX INJ SAME DRUG ADON: CPT

## 2023-04-04 PROCEDURE — 74177 CT ABD & PELVIS W/CONTRAST: CPT | Mod: MA

## 2023-04-04 PROCEDURE — 71275 CT ANGIOGRAPHY CHEST: CPT | Mod: MG

## 2023-04-04 PROCEDURE — 36415 COLL VENOUS BLD VENIPUNCTURE: CPT

## 2023-04-04 PROCEDURE — 80053 COMPREHEN METABOLIC PANEL: CPT

## 2023-04-04 RX ORDER — DIPHENHYDRAMINE HCL 50 MG
25 CAPSULE ORAL ONCE
Refills: 0 | Status: COMPLETED | OUTPATIENT
Start: 2023-04-04 | End: 2023-04-04

## 2023-04-04 RX ORDER — APIXABAN 2.5 MG/1
10 TABLET, FILM COATED ORAL ONCE
Refills: 0 | Status: COMPLETED | OUTPATIENT
Start: 2023-04-04 | End: 2023-04-04

## 2023-04-04 RX ORDER — INSULIN HUMAN 100 [IU]/ML
10 INJECTION, SOLUTION SUBCUTANEOUS ONCE
Refills: 0 | Status: COMPLETED | OUTPATIENT
Start: 2023-04-04 | End: 2023-04-04

## 2023-04-04 RX ORDER — DIPHENHYDRAMINE HCL 50 MG
50 CAPSULE ORAL ONCE
Refills: 0 | Status: COMPLETED | OUTPATIENT
Start: 2023-04-04 | End: 2023-04-04

## 2023-04-04 RX ORDER — APIXABAN 2.5 MG/1
2 TABLET, FILM COATED ORAL
Qty: 28 | Refills: 0
Start: 2023-04-04 | End: 2023-04-10

## 2023-04-04 RX ORDER — HYDROMORPHONE HYDROCHLORIDE 2 MG/ML
1 INJECTION INTRAMUSCULAR; INTRAVENOUS; SUBCUTANEOUS ONCE
Refills: 0 | Status: DISCONTINUED | OUTPATIENT
Start: 2023-04-04 | End: 2023-04-04

## 2023-04-04 RX ADMIN — HYDROMORPHONE HYDROCHLORIDE 1 MILLIGRAM(S): 2 INJECTION INTRAMUSCULAR; INTRAVENOUS; SUBCUTANEOUS at 05:05

## 2023-04-04 RX ADMIN — Medication 40 MILLIGRAM(S): at 05:04

## 2023-04-04 RX ADMIN — APIXABAN 10 MILLIGRAM(S): 2.5 TABLET, FILM COATED ORAL at 11:17

## 2023-04-04 RX ADMIN — INSULIN HUMAN 10 UNIT(S): 100 INJECTION, SOLUTION SUBCUTANEOUS at 07:53

## 2023-04-04 RX ADMIN — Medication 50 MILLIGRAM(S): at 07:17

## 2023-04-04 RX ADMIN — Medication 25 MILLIGRAM(S): at 11:02

## 2023-04-04 RX ADMIN — HYDROMORPHONE HYDROCHLORIDE 1 MILLIGRAM(S): 2 INJECTION INTRAMUSCULAR; INTRAVENOUS; SUBCUTANEOUS at 05:03

## 2023-04-04 NOTE — CONSULT NOTE ADULT - PROBLEM SELECTOR RECOMMENDATION 9
-mild erythema around site of SPT  -SPT draining OK  -f/u labs  -no acute urological findings  -to f/u as outpt at urology clinic  -f/u IR

## 2023-04-04 NOTE — ED PROVIDER NOTE - PATIENT PORTAL LINK FT
You can access the FollowMyHealth Patient Portal offered by [facility] by registering at the following website: [email or web address]. By joining [facility]’s FollowMyHealth portal, you will also be able to view your health information using other applications (apps) compatible with our system.

## 2023-04-04 NOTE — ED PROVIDER NOTE - CLINICAL SUMMARY MEDICAL DECISION MAKING FREE TEXT BOX
will evaluate for collection/infection around suprapubic site with CT. pain resolved with Dilaudid, which patient states she takes routinely.

## 2023-04-04 NOTE — ED ADULT NURSE NOTE - NSIMPLEMENTINTERV_GEN_ALL_ED
Implemented All Fall Risk Interventions:  [place] to call system. Call bell, personal items and telephone within reach. Instruct patient to call for assistance. Room bathroom lighting operational. Non-slip footwear when patient is off stretcher. Physically safe environment: no spills, clutter or unnecessary equipment. Stretcher in lowest position, wheels locked, appropriate side rails in place. Provide visual cue, wrist band, yellow gown, etc. Monitor gait and stability. Monitor for mental status changes and reorient to person, place, and time. Review medications for side effects contributing to fall risk. Reinforce activity limits and safety measures with patient and family.

## 2023-04-04 NOTE — ED PROVIDER NOTE - NSFOLLOWUPINSTRUCTIONS_ED_ALL_ED_FT
take eliquis, follow up with your pmd within a wk for evaluation and full eliquis prescription   Pulmonary Embolism  Blood clot moving from a blood vessel in the leg to the lungs, with close-ups showing the blood clot in the lung.  A pulmonary embolism (PE) is a sudden blockage or decrease of blood flow in one or both lungs that happens when a clot travels into the arteries of the lung (pulmonary arteries). Most blockages come from a blood clot that forms in the vein of a leg or arm (deep vein thrombosis, DVT) and travels to the lungs. A clot is blood that has thickened into a gel or solid. PE is a dangerous and life-threatening condition that needs to be treated right away.  What are the causes?  This condition is usually caused by a blood clot that forms in a vein and moves to the lungs. In rare cases, it may be caused by air, fat, part of a tumor, or other tissue that moves through the veins and into the lungs.    What increases the risk?  The following factors may make you more likely to develop this condition:  Experiencing a traumatic injury, such as breaking a hip or leg.  Having:  A spinal cord injury.  Major surgery, especially hip or knee replacement, or surgery on parts of the nervous system or on the abdomen.  A stroke.  A blood-clotting disease.  Long-term (chronic) lung or heart disease.  Cancer, especially if you are being treated with chemotherapy.  A central venous catheter.  Taking medicines that contain estrogen. These include birth control pills and hormone replacement therapy.  Being:  Pregnant.  In the period of time after your baby is delivered (postpartum).  Older than age 60.  Overweight.  A smoker, especially if you have other risks.  Not very active (sedentary), not being able to move at all, or spending long periods sitting, such as travel over 6 hours. You are also at a greater risk if you have a leg in a cast or splint.  What are the signs or symptoms?  Symptoms of this condition usually start suddenly and include:  Shortness of breath during activity or at rest.  Coughing, coughing up blood, or coughing up bloody mucus.  Chest pain, back pain, or shoulder blade pain that gets worse with deep breaths.  Rapid or irregular heartbeat.  Feeling light-headed or dizzy, or fainting.  Feeling anxious.  Pain and swelling in a leg. This is a symptom of DVT, which can lead to PE.  How is this diagnosed?  This condition may be diagnosed based on your medical history, a physical exam, and tests. Tests may include:  Blood tests.  An ECG (electrocardiogram) of the heart.  A CT pulmonary angiogram. This test checks blood flow in and around your lungs.  A ventilation–perfusion scan, also called a lung VQ scan. This test measures air flow and blood flow to the lungs.  An ultrasound to check for a DVT.  How is this treated?  Treatment for this condition depends on many factors, such as the cause of your PE, your risk for bleeding or developing more clots, and other medical conditions you may have. Treatment aims to stop blood clots from forming or growing larger. In some cases, treatment may be aimed at breaking apart or removing the blood clot. Treatment may include:  Medicines, such as:  Blood thinning medicines, also called anticoagulants, to stop clots from forming and growing.  Medicines that break apart clots (fibrinolytics).  Procedures, such as:  Using a flexible tube to remove a blood clot (embolectomy) or to deliver medicine to destroy it (catheter-directed thrombolysis).  Surgery to remove the clot (surgical embolectomy). This is rare.  You may need a combination of immediate, long-term, and extended treatments. Your treatment may continue for several months (maintenance therapy) or longer depending on your medical conditions. You and your health care provider will work together to choose the treatment program that is best for you.  Follow these instructions at home:  Medicines  Take over-the-counter and prescription medicines only as told by your health care provider.  If you are taking blood thinners:  Talk with your health care provider before you take any medicines that contain aspirin or NSAIDs, such as ibuprofen. These medicines increase your risk for dangerous bleeding.  Take your medicine exactly as told, at the same time every day.  Avoid activities that could cause injury or bruising, and follow instructions about how to prevent falls.  Wear a medical alert bracelet or carry a card that lists what medicines you take.  Understand what foods and drugs interact with any medicines that you are taking.  General instructions  Ask your health care provider when you may return to your normal activities. Avoid sitting or lying for a long time without moving.  Maintain a healthy weight. Ask your health care provider what weight is healthy for you.  Do not use any products that contain nicotine or tobacco. These products include cigarettes, chewing tobacco, and vaping devices, such as e-cigarettes. If you need help quitting, ask your health care provider.  Talk with your health care provider about any travel plans. It is important to make sure that you are still able to take your medicine while traveling.  Keep all follow-up visits. This is important.  Where to find more information  American Lung Association: www.lung.org  Centers for Disease Control and Prevention: www.cdc.gov  Contact a health care provider if:  You missed a dose of your blood thinner medicine.  You have a fever.  Get help right away if:  You have:  New or increased pain, swelling, warmth, or redness in an arm or leg.  Shortness of breath that gets worse during activity or at rest.  Worsening chest pain.  A rapid or irregular heartbeat.  A severe headache.  Vision changes.  A serious fall or accident, or you hit your head.  Blood in your vomit, stool, or urine.  A cut that will not stop bleeding.  You cough up blood.  You feel light-headed or dizzy, and that feeling does not go away.  You cannot move your arms or legs.  You are confused or have memory loss.  These symptoms may represent a serious problem that is an emergency. Do not wait to see if the symptoms will go away. Get medical help right away. Call your local emergency services (343 in the [place] Do not drive yourself to the hospital.  Summary  A pulmonary embolism (PE) is a serious and potentially life-threatening condition. It happens when a blood clot from one part of the body travels to the arteries of the lung, causing a sudden blockage or decrease of blood flow to the lungs. This may result in shortness of breath, chest pain, dizziness, and fainting.  Treatments for this condition usually include medicines to thin your blood (anticoagulants) or medicines to break apart blood clots.  If you are given blood thinners, take your medicine exactly as told by your health care provider, at the same time every day. This is important.  Understand what foods and drugs interact with any medicines that you are taking.  If you have signs of PE or DVT, call your local emergency services (971 in the [place]

## 2023-04-04 NOTE — ED ADULT NURSE NOTE - OBJECTIVE STATEMENT
43y yo Female  with PMH of HTN, HLD, Abdominal infection secondary to Necrotizing fascitis (s/p suprapubic catheter and ostomy bag placement on 11/11/21 at [facility]), dc from [facility] 1 week ago after admission for sepsis 2/2 complicated cystitis. s/p suprapubic tube change during this recent hospitalization. for the past 2 days experiencing pain around the suprapubic site and in the lower abdomen. mild nausea. soft stool output in ostomy bag. reports fever of 99.2

## 2023-04-04 NOTE — ED PROVIDER NOTE - OBJECTIVE STATEMENT
43y yo Female  with PMH of HTN, HLD, Abdominal infection c/b Necrotizing fascitis (s/p suprapubic catheter and ostomy bag placement on 11/11/21 at [facility]), dc from [facility] 1 week ago after admission for sepsis 2/2 complicated cystitis. s/p suprapubic tube change during this recent hospitalization. for the past 2 days experiencing pain around the suprapubic site and in the lower abdomen. mild nausea. soft stool output in ostomy bag. reports fever of 99.2

## 2023-04-04 NOTE — ED PROVIDER NOTE - PROGRESS NOTE DETAILS
remedios: pt received at sign out as pending ct abd (pre medicated due to contrast allergy) --  no acute findings on abd/pelvis but ? pe -- pt sent back up for cta chest. pt nad, texting on phone, hemodynamically stable during entire stay pt requesting benadryl -- states "itching" -- no rash, no hives, no objective findings, benadryl ordered by dr benitez, pt also requesting more iv dilaudid -- offered percocet, no indication for iv narcotics, given dose of eliquis and dc'd to f/u w/pmd

## 2023-04-04 NOTE — CONSULT NOTE ADULT - ASSESSMENT
43y yo Female pain SPT site of insertion. PMH of HTN, HLD, Abdominal infection c/b Necrotizing  fascitis (s/p suprapubic catheter and ostomy bag placement on 11/11/21 at  [facility]) , admitted recently for sepsis from urinary source.   consulted on previous admission for dysfunctional SPT. SPT placed 11/11/21 (followed with  urologist at [place]). SPT exchanged q monthly by IR b/c cannot tolerate  bedside SPT exchange. 2 months ago, SPT was pulled out by doctor at [facility] for TOV, initially passed however prefers SPT due to dysuria and  nonambulatory status. Original SPT site closed and healed. IR at [place] placed  another SPT to LLQ below ostomy site, which was pulled when nursing changed  ostomy bag. Now S/p IR placement of 12 Fr SPT 3/24 on RLQ.  Patient now presents to ED c/o discomfort SPT. Highest temp 99.2F. SPT draining OK with some debris noted.   No N/V.

## 2023-04-04 NOTE — ED PROVIDER NOTE - PHYSICAL EXAMINATION
General: Awake, alert and oriented. No acute distress. Appears stated age.   Skin: Skin in warm, dry and intact without rashes or lesions. Appropriate color for ethnicity  HENMT: head normocephalic and atraumatic; bilateral external ears without swelling. no nasal discharge. moist oral mucosa. supple neck, trachea midline  EYES: Conjunctiva clear. nonicteric sclera. EOM intact, Eyelids are normal in appearance without swelling or lesions.  Cardiac: well perfused  Respiratory: breathing comfortably on room air. no audible wheezing or stridor  Abdominal: nondistended, soft, ostomy site with no surrounding discharge or erythema, non tender. suprapubic site wth small amount of erythema around site, mild ttp around this area.   MSK: Neck and back are without deformity, visible external skin changes, or signs of trauma. Curvature of the cervical, thoracic, and lumbar spine are within normal limits. no external signs of trauma. no apparent deficits in ROM of any extremity  Neurological: The patient is awake, alert and oriented to person, place, and time with normal speech. CN 2-12 grossly intact. no apparent deficits. Memory is normal and thought process is intact. No gait abnormalities are appreciated.   Psychiatric: Appropriate mood and affect. Good judgement and insight

## 2023-04-04 NOTE — ED ADULT NURSE REASSESSMENT NOTE - NS ED NURSE REASSESS COMMENT FT1
Benadryl given as per orders for CT prep.
Pt transport to CT scan. Bobo Lorenz and MD Peterson aware of pre medication timing and approved on pt going to CT at this time.
Report received from night shift. Pt NAD, breathing unlabored and equal on RA. Pt repeat FS at 840 post 10units insulin.

## 2023-04-04 NOTE — CONSULT NOTE ADULT - SUBJECTIVE AND OBJECTIVE BOX
HPI:  43y yo Female with PMH of HTN, HLD, Abdominal infection c/b Necrotizing  fascitis (s/p suprapubic catheter and ostomy bag placement on 21 at  [facility]) , admitted recently for sepsis from urinary source.   consulted on previous admission for dysfunctional SPT. SPT placed 21 (followed with  urologist at [facility]). SPT exchanged q monthly by IR b/c cannot tolerate  bedside SPT exchange. 2 months ago, SPT was pulled out by doctor at [facility] for TOV, initially passed however prefers SPT due to dysuria and  nonambulatory status. Original SPT site closed and healed. IR at [facility] placed  another SPT to LLQ below ostomy site, which was pulled when nursing changed  ostomy bag. Now S/p IR placement of 12 Fr SPT 3/24 on RLQ.  Patient now presents to ED c/o discomfort SPT. Highest temp 99.2F. SPT draining OK with some debris noted.   No N/V. Doesn't recall if still on po antibx since discharge from hospital.       PAST MEDICAL & SURGICAL HISTORY:  Essential hypertension      Hyperlipidemia      Diabetes      Obesity      Abdominal hernia      Pre-eclampsia      H/O LEEP      History of D&C      H/O:       H/O ventral hernia repair      H/O exploratory laparotomy          MEDICATIONS  (STANDING):    MEDICATIONS  (PRN):      Allergies    amoxicillin (Unknown)  clindamycin (Pruritus)  fish (Hives; Urticaria)  iodine containing compounds (Hives; Anaphylaxis)  penicillin (Hives)  shellfish. (Hives; Anaphylaxis)    Intolerances    Bactrim I.V. (Rash (Mod to Severe))      SOCIAL HISTORY:    FAMILY HISTORY:  FH: diabetes mellitus  father and mother    FH: hypertension  father and mother        Vital Signs Last 24 Hrs  T(C): 37.3 (2023 22:37), Max: 37.3 (2023 22:37)  T(F): 99.2 (2023 22:37), Max: 99.2 (2023 22:37)  HR: 114 (2023 22:37) (114 - 114)  BP: 162/92 (2023 22:37) (162/92 - 162/92)  BP(mean): --  RR: 18 (2023 22:37) (18 - 18)  SpO2: 95% (2023 22:37) (95% - 95%)    Parameters below as of 2023 22:37  Patient On (Oxygen Delivery Method): room air        On PE:  General: alert and awake  Abdomen: soft, Colostomy intact, ND    : SPT intact 12fr, urine slightly cloudy, some erythema noted around site of insertion, no fluctuance, no d/c    EXT: no calf tenderness    LABS:                RADIOLOGY & ADDITIONAL STUDIES:

## 2023-04-07 LAB
-  AMPICILLIN/SULBACTAM: SIGNIFICANT CHANGE UP
-  AMPICILLIN/SULBACTAM: SIGNIFICANT CHANGE UP
-  AMPICILLIN: SIGNIFICANT CHANGE UP
-  AMPICILLIN: SIGNIFICANT CHANGE UP
-  CEFAZOLIN: SIGNIFICANT CHANGE UP
-  CEFAZOLIN: SIGNIFICANT CHANGE UP
-  CEFTRIAXONE: SIGNIFICANT CHANGE UP
-  CEFTRIAXONE: SIGNIFICANT CHANGE UP
-  CIPROFLOXACIN: SIGNIFICANT CHANGE UP
-  CIPROFLOXACIN: SIGNIFICANT CHANGE UP
-  ERTAPENEM: SIGNIFICANT CHANGE UP
-  ERTAPENEM: SIGNIFICANT CHANGE UP
-  GENTAMICIN: SIGNIFICANT CHANGE UP
-  GENTAMICIN: SIGNIFICANT CHANGE UP
-  MEROPENEM: SIGNIFICANT CHANGE UP
-  MEROPENEM: SIGNIFICANT CHANGE UP
-  PIPERACILLIN/TAZOBACTAM: SIGNIFICANT CHANGE UP
-  PIPERACILLIN/TAZOBACTAM: SIGNIFICANT CHANGE UP
-  TOBRAMYCIN: SIGNIFICANT CHANGE UP
-  TOBRAMYCIN: SIGNIFICANT CHANGE UP
-  TRIMETHOPRIM/SULFAMETHOXAZOLE: SIGNIFICANT CHANGE UP
-  TRIMETHOPRIM/SULFAMETHOXAZOLE: SIGNIFICANT CHANGE UP
CULTURE RESULTS: SIGNIFICANT CHANGE UP
METHOD TYPE: SIGNIFICANT CHANGE UP
ORGANISM # SPEC MICROSCOPIC CNT: SIGNIFICANT CHANGE UP
SPECIMEN SOURCE: SIGNIFICANT CHANGE UP

## 2023-04-07 NOTE — ED POST DISCHARGE NOTE - DETAILS
pt with no fever, has abd pain, unclear if colonized or active infection requiring admission, advised to f/u with her pmd and urologist and if having worsening pain or fever to return to ED

## 2023-04-10 RX ORDER — BLOOD-GLUCOSE METER
W/DEVICE KIT MISCELLANEOUS
Qty: 1 | Refills: 0 | Status: ACTIVE | COMMUNITY
Start: 2023-01-04 | End: 1900-01-01

## 2023-04-27 VITALS
WEIGHT: 220.9 LBS | TEMPERATURE: 99 F | RESPIRATION RATE: 18 BRPM | OXYGEN SATURATION: 97 % | DIASTOLIC BLOOD PRESSURE: 87 MMHG | HEART RATE: 129 BPM | HEIGHT: 67 IN | SYSTOLIC BLOOD PRESSURE: 169 MMHG

## 2023-04-27 LAB
ALBUMIN SERPL ELPH-MCNC: 3.7 G/DL — SIGNIFICANT CHANGE UP (ref 3.3–5)
ALP SERPL-CCNC: 154 U/L — HIGH (ref 40–120)
ALT FLD-CCNC: 11 U/L — SIGNIFICANT CHANGE UP (ref 10–45)
ANION GAP SERPL CALC-SCNC: 14 MMOL/L — SIGNIFICANT CHANGE UP (ref 5–17)
APPEARANCE UR: ABNORMAL
APTT BLD: 31 SEC — SIGNIFICANT CHANGE UP (ref 27.5–35.5)
AST SERPL-CCNC: 18 U/L — SIGNIFICANT CHANGE UP (ref 10–40)
BACTERIA # UR AUTO: ABNORMAL /HPF
BASE EXCESS BLDV CALC-SCNC: 4.6 MMOL/L — HIGH (ref -2–3)
BASOPHILS # BLD AUTO: 0.03 K/UL — SIGNIFICANT CHANGE UP (ref 0–0.2)
BASOPHILS NFR BLD AUTO: 0.2 % — SIGNIFICANT CHANGE UP (ref 0–2)
BILIRUB SERPL-MCNC: <0.2 MG/DL — SIGNIFICANT CHANGE UP (ref 0.2–1.2)
BILIRUB UR-MCNC: NEGATIVE — SIGNIFICANT CHANGE UP
BUN SERPL-MCNC: 11 MG/DL — SIGNIFICANT CHANGE UP (ref 7–23)
CA-I SERPL-SCNC: 1.19 MMOL/L — SIGNIFICANT CHANGE UP (ref 1.15–1.33)
CALCIUM SERPL-MCNC: 9.2 MG/DL — SIGNIFICANT CHANGE UP (ref 8.4–10.5)
CHLORIDE SERPL-SCNC: 100 MMOL/L — SIGNIFICANT CHANGE UP (ref 96–108)
CO2 BLDV-SCNC: 29.5 MMOL/L — HIGH (ref 22–26)
CO2 SERPL-SCNC: 24 MMOL/L — SIGNIFICANT CHANGE UP (ref 22–31)
COLOR SPEC: YELLOW — SIGNIFICANT CHANGE UP
COMMENT - URINE: SIGNIFICANT CHANGE UP
CREAT SERPL-MCNC: 0.82 MG/DL — SIGNIFICANT CHANGE UP (ref 0.5–1.3)
DIFF PNL FLD: ABNORMAL
EGFR: 91 ML/MIN/1.73M2 — SIGNIFICANT CHANGE UP
EOSINOPHIL # BLD AUTO: 0.12 K/UL — SIGNIFICANT CHANGE UP (ref 0–0.5)
EOSINOPHIL NFR BLD AUTO: 1 % — SIGNIFICANT CHANGE UP (ref 0–6)
EPI CELLS # UR: ABNORMAL /HPF (ref 0–5)
GAS PNL BLDV: 138 MMOL/L — SIGNIFICANT CHANGE UP (ref 136–145)
GAS PNL BLDV: SIGNIFICANT CHANGE UP
GAS PNL BLDV: SIGNIFICANT CHANGE UP
GLUCOSE SERPL-MCNC: 130 MG/DL — HIGH (ref 70–99)
GLUCOSE UR QL: >=1000
HCG SERPL-ACNC: <0 MIU/ML — SIGNIFICANT CHANGE UP
HCO3 BLDV-SCNC: 28 MMOL/L — SIGNIFICANT CHANGE UP (ref 22–29)
HCT VFR BLD CALC: 42.4 % — SIGNIFICANT CHANGE UP (ref 34.5–45)
HGB BLD-MCNC: 13.4 G/DL — SIGNIFICANT CHANGE UP (ref 11.5–15.5)
IMM GRANULOCYTES NFR BLD AUTO: 0.5 % — SIGNIFICANT CHANGE UP (ref 0–0.9)
INR BLD: 1.08 — SIGNIFICANT CHANGE UP (ref 0.88–1.16)
KETONES UR-MCNC: NEGATIVE — SIGNIFICANT CHANGE UP
LACTATE SERPL-SCNC: 2.4 MMOL/L — HIGH (ref 0.5–2)
LEUKOCYTE ESTERASE UR-ACNC: ABNORMAL
LYMPHOCYTES # BLD AUTO: 28.5 % — SIGNIFICANT CHANGE UP (ref 13–44)
LYMPHOCYTES # BLD AUTO: 3.58 K/UL — HIGH (ref 1–3.3)
MCHC RBC-ENTMCNC: 24.4 PG — LOW (ref 27–34)
MCHC RBC-ENTMCNC: 31.6 GM/DL — LOW (ref 32–36)
MCV RBC AUTO: 77.2 FL — LOW (ref 80–100)
MONOCYTES # BLD AUTO: 0.66 K/UL — SIGNIFICANT CHANGE UP (ref 0–0.9)
MONOCYTES NFR BLD AUTO: 5.2 % — SIGNIFICANT CHANGE UP (ref 2–14)
NEUTROPHILS # BLD AUTO: 8.13 K/UL — HIGH (ref 1.8–7.4)
NEUTROPHILS NFR BLD AUTO: 64.6 % — SIGNIFICANT CHANGE UP (ref 43–77)
NITRITE UR-MCNC: POSITIVE
NRBC # BLD: 0 /100 WBCS — SIGNIFICANT CHANGE UP (ref 0–0)
PCO2 BLDV: 38 MMHG — LOW (ref 39–42)
PH BLDV: 7.48 — HIGH (ref 7.32–7.43)
PH UR: 6 — SIGNIFICANT CHANGE UP (ref 5–8)
PLATELET # BLD AUTO: 537 K/UL — HIGH (ref 150–400)
PO2 BLDV: 56 MMHG — HIGH (ref 25–45)
POTASSIUM BLDV-SCNC: 3 MMOL/L — LOW (ref 3.5–5.1)
POTASSIUM SERPL-MCNC: 3.1 MMOL/L — LOW (ref 3.5–5.3)
POTASSIUM SERPL-SCNC: 3.1 MMOL/L — LOW (ref 3.5–5.3)
PROT SERPL-MCNC: 7.5 G/DL — SIGNIFICANT CHANGE UP (ref 6–8.3)
PROT UR-MCNC: 30 MG/DL
PROTHROM AB SERPL-ACNC: 12.9 SEC — SIGNIFICANT CHANGE UP (ref 10.5–13.4)
RBC # BLD: 5.49 M/UL — HIGH (ref 3.8–5.2)
RBC # FLD: 15.2 % — HIGH (ref 10.3–14.5)
RBC CASTS # UR COMP ASSIST: ABNORMAL /HPF
SAO2 % BLDV: 91.6 % — HIGH (ref 67–88)
SODIUM SERPL-SCNC: 138 MMOL/L — SIGNIFICANT CHANGE UP (ref 135–145)
SP GR SPEC: 1.02 — SIGNIFICANT CHANGE UP (ref 1–1.03)
UROBILINOGEN FLD QL: 0.2 E.U./DL — SIGNIFICANT CHANGE UP
WBC # BLD: 12.58 K/UL — HIGH (ref 3.8–10.5)
WBC # FLD AUTO: 12.58 K/UL — HIGH (ref 3.8–10.5)
WBC UR QL: ABNORMAL /HPF

## 2023-04-27 PROCEDURE — 71045 X-RAY EXAM CHEST 1 VIEW: CPT | Mod: 26

## 2023-04-27 PROCEDURE — 99285 EMERGENCY DEPT VISIT HI MDM: CPT

## 2023-04-27 PROCEDURE — 74176 CT ABD & PELVIS W/O CONTRAST: CPT | Mod: 26,MA

## 2023-04-27 RX ORDER — SODIUM CHLORIDE 9 MG/ML
1000 INJECTION INTRAMUSCULAR; INTRAVENOUS; SUBCUTANEOUS ONCE
Refills: 0 | Status: COMPLETED | OUTPATIENT
Start: 2023-04-27 | End: 2023-04-27

## 2023-04-27 RX ORDER — POTASSIUM CHLORIDE 20 MEQ
40 PACKET (EA) ORAL ONCE
Refills: 0 | Status: COMPLETED | OUTPATIENT
Start: 2023-04-27 | End: 2023-04-27

## 2023-04-27 RX ORDER — ACETAMINOPHEN 500 MG
650 TABLET ORAL ONCE
Refills: 0 | Status: COMPLETED | OUTPATIENT
Start: 2023-04-27 | End: 2023-04-27

## 2023-04-27 RX ORDER — GENTAMICIN SULFATE 40 MG/ML
500 VIAL (ML) INJECTION ONCE
Refills: 0 | Status: COMPLETED | OUTPATIENT
Start: 2023-04-27 | End: 2023-04-27

## 2023-04-27 RX ORDER — IOHEXOL 300 MG/ML
30 INJECTION, SOLUTION INTRAVENOUS ONCE
Refills: 0 | Status: DISCONTINUED | OUTPATIENT
Start: 2023-04-27 | End: 2023-04-27

## 2023-04-27 RX ADMIN — Medication 300 MILLIGRAM(S): at 22:27

## 2023-04-27 RX ADMIN — SODIUM CHLORIDE 1000 MILLILITER(S): 9 INJECTION INTRAMUSCULAR; INTRAVENOUS; SUBCUTANEOUS at 22:04

## 2023-04-27 RX ADMIN — Medication 40 MILLIEQUIVALENT(S): at 23:12

## 2023-04-27 RX ADMIN — Medication 650 MILLIGRAM(S): at 22:30

## 2023-04-27 RX ADMIN — Medication 650 MILLIGRAM(S): at 22:04

## 2023-04-27 NOTE — ED PROVIDER NOTE - CARE PLAN
1 Principal Discharge DX:	Sepsis  Secondary Diagnosis:	Hypokalemia   Principal Discharge DX:	Sepsis  Secondary Diagnosis:	Hypokalemia  Secondary Diagnosis:	Urinary tract infection

## 2023-04-27 NOTE — ED ADULT NURSE NOTE - OBJECTIVE STATEMENT
Pt is a 43yF presenting from home endorsing abd pain for a days along with multiple changes in BGL. Denies an watery, black coffee ground stools. Pt ambulates with walker. Extensive PMH as per patient; previously hospitalized for sepsis, hx of DM, colostomy bag, suprapubic catheter. Pt has multiple allergies, please review chart. Pt is alert and oriented to person, place, time. Spon breathing on RA, unlabored.

## 2023-04-27 NOTE — ED ADULT TRIAGE NOTE - CHIEF COMPLAINT QUOTE
pt sent by pcp md burden. pt called back by [facility] ed 4/7, "+MDR klebsiella in urine". f/u with md and told to go to ed for admission. also c/o blood sugars fluctuating, intermittent cp, ha, suprapubic cath draining pus.

## 2023-04-27 NOTE — ED PROVIDER NOTE - PSYCHIATRIC, MLM
Alert and oriented to person, place, time/situation. normal mood and affect. no apparent risk to self or others.
09-Nov-2017

## 2023-04-27 NOTE — ED PROVIDER NOTE - PROGRESS NOTE DETAILS
[place]: WBC 12.58 (at baseline), K 3.1 (will replete), alk phos 154, lactate 2.4, VBG pH 7.48, other labs grossly wnl. UA+. Still mild HTN, still tachycardic, improving. Rpt lactate increased to 3. CT results pending, will reassess. [place]: CT prelim w/ no acute pathology. Rpt lactate mildly worsened to 3. Pt hwever clinically improving. HR improving. Pain improving. On 3rd L IVf. Remains very well appearing. Will admit medicine for further care.

## 2023-04-27 NOTE — ED PROVIDER NOTE - OBJECTIVE STATEMENT
43F PMH DM, HTN, HLD, Abdominal Necrotizing fascitis (s/p suprapubic catheter and ostomy bag placement on 11/11/21 at [facility]), p/w several complaints. Pt states that she has several weeks of increasing pain to suprapubic catheter site as well as lower abdomen. Thinks that pus is draining from site. Also feels taht catheter has moved as she can now feel it at surface of her skin. Also w/ vague subjective fever. Also intermittent nausea. Was also in ED 4/7 for pain, Urine cx had  grew ESBL E coli, sensitive to gentamicin. Pt states that she called her PMD (Dr. Sánchez) due to worsening symptoms and he advised her to go to ED.   Unchanged output from ostomy. Admitted ~1mo ago at [facility] for sepsis likely 2/2 cystitis, was treated w/ genta.   Denies SOB, CP, vomiting, URI symptoms, new focal weakness/numbness (has chronic LLE foot drop, ambulates w/ rollator at baseline).

## 2023-04-27 NOTE — ED PROVIDER NOTE - CLINICAL SUMMARY MEDICAL DECISION MAKING FREE TEXT BOX
43F PMH DM, HTN, HLD, Abdominal Necrotizing fascitis (s/p suprapubic catheter and ostomy bag placement on 11/11/21 at [facility]), p/w several complaints. Pt states that she has several weeks of increasing pain to suprapubic catheter site as well as lower abdomen. Thinks that pus is draining from site. Also feels taht catheter has moved as she can now feel it at surface of her skin. Also w/ vague subjective fever. Also intermittent nausea. Was also in ED 4/7 for pain, Urine cx had  grew ESBL E coli, sensitive to gentamicin. Pt states that she called her PMD (Dr. Sánchez) due to worsening symptoms and he advised her to go to ED.   Unchanged output from ostomy. Admitted ~1mo ago at [facility] for sepsis likely 2/2 cystitis, was treated w/ genta.   Tachycardic, 99.3 oral temp, other vitals wnl. Exam as above.  ddx: Likely sepsis. Likely urinary source. ?Fistula or deeper infection.   Has allergy to IV dye.  CT, labs, empiric abx, IVF, reassess.

## 2023-04-28 ENCOUNTER — INPATIENT (INPATIENT)
Facility: HOSPITAL | Age: 43
LOS: 4 days | Discharge: HOME HEALTH | DRG: 698 | End: 2023-05-03
Attending: INTERNAL MEDICINE | Admitting: STUDENT IN AN ORGANIZED HEALTH CARE EDUCATION/TRAINING PROGRAM
Payer: MEDICAID

## 2023-04-28 ENCOUNTER — TRANSCRIPTION ENCOUNTER (OUTPATIENT)
Age: 43
End: 2023-04-28

## 2023-04-28 DIAGNOSIS — Z98.890 OTHER SPECIFIED POSTPROCEDURAL STATES: Chronic | ICD-10-CM

## 2023-04-28 DIAGNOSIS — Z43.5 ENCOUNTER FOR ATTENTION TO CYSTOSTOMY: ICD-10-CM

## 2023-04-28 DIAGNOSIS — Z29.9 ENCOUNTER FOR PROPHYLACTIC MEASURES, UNSPECIFIED: ICD-10-CM

## 2023-04-28 DIAGNOSIS — Z98.89 OTHER SPECIFIED POSTPROCEDURAL STATES: Chronic | ICD-10-CM

## 2023-04-28 DIAGNOSIS — E66.9 OBESITY, UNSPECIFIED: ICD-10-CM

## 2023-04-28 DIAGNOSIS — Z93.9 ARTIFICIAL OPENING STATUS, UNSPECIFIED: ICD-10-CM

## 2023-04-28 DIAGNOSIS — I26.99 OTHER PULMONARY EMBOLISM WITHOUT ACUTE COR PULMONALE: ICD-10-CM

## 2023-04-28 DIAGNOSIS — E11.9 TYPE 2 DIABETES MELLITUS WITHOUT COMPLICATIONS: ICD-10-CM

## 2023-04-28 DIAGNOSIS — A41.9 SEPSIS, UNSPECIFIED ORGANISM: ICD-10-CM

## 2023-04-28 DIAGNOSIS — I10 ESSENTIAL (PRIMARY) HYPERTENSION: ICD-10-CM

## 2023-04-28 LAB
A1C WITH ESTIMATED AVERAGE GLUCOSE RESULT: 12.2 % — HIGH (ref 4–5.6)
ALBUMIN SERPL ELPH-MCNC: 2.8 G/DL — LOW (ref 3.3–5)
ALP SERPL-CCNC: 124 U/L — HIGH (ref 40–120)
ALT FLD-CCNC: 9 U/L — LOW (ref 10–45)
ANION GAP SERPL CALC-SCNC: 10 MMOL/L — SIGNIFICANT CHANGE UP (ref 5–17)
ANION GAP SERPL CALC-SCNC: 10 MMOL/L — SIGNIFICANT CHANGE UP (ref 5–17)
AST SERPL-CCNC: 12 U/L — SIGNIFICANT CHANGE UP (ref 10–40)
BASOPHILS # BLD AUTO: 0.03 K/UL — SIGNIFICANT CHANGE UP (ref 0–0.2)
BASOPHILS NFR BLD AUTO: 0.3 % — SIGNIFICANT CHANGE UP (ref 0–2)
BILIRUB SERPL-MCNC: <0.2 MG/DL — SIGNIFICANT CHANGE UP (ref 0.2–1.2)
BUN SERPL-MCNC: 8 MG/DL — SIGNIFICANT CHANGE UP (ref 7–23)
BUN SERPL-MCNC: 9 MG/DL — SIGNIFICANT CHANGE UP (ref 7–23)
CALCIUM SERPL-MCNC: 7.6 MG/DL — LOW (ref 8.4–10.5)
CALCIUM SERPL-MCNC: 7.8 MG/DL — LOW (ref 8.4–10.5)
CHLORIDE SERPL-SCNC: 105 MMOL/L — SIGNIFICANT CHANGE UP (ref 96–108)
CHLORIDE SERPL-SCNC: 106 MMOL/L — SIGNIFICANT CHANGE UP (ref 96–108)
CO2 SERPL-SCNC: 22 MMOL/L — SIGNIFICANT CHANGE UP (ref 22–31)
CO2 SERPL-SCNC: 23 MMOL/L — SIGNIFICANT CHANGE UP (ref 22–31)
CREAT SERPL-MCNC: 0.6 MG/DL — SIGNIFICANT CHANGE UP (ref 0.5–1.3)
CREAT SERPL-MCNC: 0.63 MG/DL — SIGNIFICANT CHANGE UP (ref 0.5–1.3)
EGFR: 113 ML/MIN/1.73M2 — SIGNIFICANT CHANGE UP
EGFR: 114 ML/MIN/1.73M2 — SIGNIFICANT CHANGE UP
EOSINOPHIL # BLD AUTO: 0.13 K/UL — SIGNIFICANT CHANGE UP (ref 0–0.5)
EOSINOPHIL NFR BLD AUTO: 1.2 % — SIGNIFICANT CHANGE UP (ref 0–6)
ESTIMATED AVERAGE GLUCOSE: 303 MG/DL — HIGH (ref 68–114)
GENTAMICIN SERPL-MCNC: 4.6 UG/ML
GLUCOSE BLDC GLUCOMTR-MCNC: 146 MG/DL — HIGH (ref 70–99)
GLUCOSE BLDC GLUCOMTR-MCNC: 174 MG/DL — HIGH (ref 70–99)
GLUCOSE BLDC GLUCOMTR-MCNC: 311 MG/DL — HIGH (ref 70–99)
GLUCOSE BLDC GLUCOMTR-MCNC: 408 MG/DL — HIGH (ref 70–99)
GLUCOSE BLDC GLUCOMTR-MCNC: 499 MG/DL — CRITICAL HIGH (ref 70–99)
GLUCOSE BLDC GLUCOMTR-MCNC: 73 MG/DL — SIGNIFICANT CHANGE UP (ref 70–99)
GLUCOSE SERPL-MCNC: 137 MG/DL — HIGH (ref 70–99)
GLUCOSE SERPL-MCNC: 179 MG/DL — HIGH (ref 70–99)
HCT VFR BLD CALC: 36.5 % — SIGNIFICANT CHANGE UP (ref 34.5–45)
HGB BLD-MCNC: 10.8 G/DL — LOW (ref 11.5–15.5)
IMM GRANULOCYTES NFR BLD AUTO: 0.5 % — SIGNIFICANT CHANGE UP (ref 0–0.9)
LACTATE SERPL-SCNC: 1.6 MMOL/L — SIGNIFICANT CHANGE UP (ref 0.5–2)
LACTATE SERPL-SCNC: 3 MMOL/L — HIGH (ref 0.5–2)
LYMPHOCYTES # BLD AUTO: 27.5 % — SIGNIFICANT CHANGE UP (ref 13–44)
LYMPHOCYTES # BLD AUTO: 3.04 K/UL — SIGNIFICANT CHANGE UP (ref 1–3.3)
MAGNESIUM SERPL-MCNC: 1.5 MG/DL — LOW (ref 1.6–2.6)
MCHC RBC-ENTMCNC: 23.5 PG — LOW (ref 27–34)
MCHC RBC-ENTMCNC: 29.6 GM/DL — LOW (ref 32–36)
MCV RBC AUTO: 79.5 FL — LOW (ref 80–100)
MONOCYTES # BLD AUTO: 0.63 K/UL — SIGNIFICANT CHANGE UP (ref 0–0.9)
MONOCYTES NFR BLD AUTO: 5.7 % — SIGNIFICANT CHANGE UP (ref 2–14)
NEUTROPHILS # BLD AUTO: 7.16 K/UL — SIGNIFICANT CHANGE UP (ref 1.8–7.4)
NEUTROPHILS NFR BLD AUTO: 64.8 % — SIGNIFICANT CHANGE UP (ref 43–77)
NRBC # BLD: 0 /100 WBCS — SIGNIFICANT CHANGE UP (ref 0–0)
PHOSPHATE SERPL-MCNC: 2.6 MG/DL — SIGNIFICANT CHANGE UP (ref 2.5–4.5)
PLATELET # BLD AUTO: 418 K/UL — HIGH (ref 150–400)
POTASSIUM SERPL-MCNC: 2.7 MMOL/L — CRITICAL LOW (ref 3.5–5.3)
POTASSIUM SERPL-MCNC: 3.8 MMOL/L — SIGNIFICANT CHANGE UP (ref 3.5–5.3)
POTASSIUM SERPL-SCNC: 2.7 MMOL/L — CRITICAL LOW (ref 3.5–5.3)
POTASSIUM SERPL-SCNC: 3.8 MMOL/L — SIGNIFICANT CHANGE UP (ref 3.5–5.3)
PROT SERPL-MCNC: 6.1 G/DL — SIGNIFICANT CHANGE UP (ref 6–8.3)
RBC # BLD: 4.59 M/UL — SIGNIFICANT CHANGE UP (ref 3.8–5.2)
RBC # FLD: 15.2 % — HIGH (ref 10.3–14.5)
SODIUM SERPL-SCNC: 138 MMOL/L — SIGNIFICANT CHANGE UP (ref 135–145)
SODIUM SERPL-SCNC: 138 MMOL/L — SIGNIFICANT CHANGE UP (ref 135–145)
TSH SERPL-MCNC: 0.85 UIU/ML — SIGNIFICANT CHANGE UP (ref 0.27–4.2)
WBC # BLD: 11.04 K/UL — HIGH (ref 3.8–10.5)
WBC # FLD AUTO: 11.04 K/UL — HIGH (ref 3.8–10.5)

## 2023-04-28 PROCEDURE — 99233 SBSQ HOSP IP/OBS HIGH 50: CPT | Mod: GC

## 2023-04-28 PROCEDURE — 99222 1ST HOSP IP/OBS MODERATE 55: CPT

## 2023-04-28 PROCEDURE — 99221 1ST HOSP IP/OBS SF/LOW 40: CPT

## 2023-04-28 RX ORDER — HYDROMORPHONE HYDROCHLORIDE 2 MG/ML
0.5 INJECTION INTRAMUSCULAR; INTRAVENOUS; SUBCUTANEOUS ONCE
Refills: 0 | Status: DISCONTINUED | OUTPATIENT
Start: 2023-04-28 | End: 2023-04-28

## 2023-04-28 RX ORDER — INSULIN LISPRO 100/ML
12 VIAL (ML) SUBCUTANEOUS ONCE
Refills: 0 | Status: COMPLETED | OUTPATIENT
Start: 2023-04-28 | End: 2023-04-28

## 2023-04-28 RX ORDER — POTASSIUM CHLORIDE 20 MEQ
40 PACKET (EA) ORAL
Refills: 0 | Status: DISCONTINUED | OUTPATIENT
Start: 2023-04-28 | End: 2023-04-28

## 2023-04-28 RX ORDER — SODIUM CHLORIDE 9 MG/ML
500 INJECTION, SOLUTION INTRAVENOUS
Refills: 0 | Status: DISCONTINUED | OUTPATIENT
Start: 2023-04-28 | End: 2023-04-28

## 2023-04-28 RX ORDER — HYDROMORPHONE HYDROCHLORIDE 2 MG/ML
1 INJECTION INTRAMUSCULAR; INTRAVENOUS; SUBCUTANEOUS ONCE
Refills: 0 | Status: DISCONTINUED | OUTPATIENT
Start: 2023-04-28 | End: 2023-04-28

## 2023-04-28 RX ORDER — SODIUM CHLORIDE 9 MG/ML
1000 INJECTION, SOLUTION INTRAVENOUS
Refills: 0 | Status: DISCONTINUED | OUTPATIENT
Start: 2023-04-28 | End: 2023-05-03

## 2023-04-28 RX ORDER — SODIUM CHLORIDE 9 MG/ML
1000 INJECTION INTRAMUSCULAR; INTRAVENOUS; SUBCUTANEOUS ONCE
Refills: 0 | Status: COMPLETED | OUTPATIENT
Start: 2023-04-28 | End: 2023-04-28

## 2023-04-28 RX ORDER — DEXTROSE 50 % IN WATER 50 %
25 SYRINGE (ML) INTRAVENOUS ONCE
Refills: 0 | Status: DISCONTINUED | OUTPATIENT
Start: 2023-04-28 | End: 2023-05-03

## 2023-04-28 RX ORDER — OXYCODONE HYDROCHLORIDE 5 MG/1
10 TABLET ORAL EVERY 6 HOURS
Refills: 0 | Status: DISCONTINUED | OUTPATIENT
Start: 2023-04-28 | End: 2023-05-03

## 2023-04-28 RX ORDER — INSULIN HUMAN 100 [IU]/ML
128 INJECTION, SOLUTION SUBCUTANEOUS
Refills: 0 | Status: DISCONTINUED | OUTPATIENT
Start: 2023-04-28 | End: 2023-04-28

## 2023-04-28 RX ORDER — INSULIN LISPRO 100/ML
VIAL (ML) SUBCUTANEOUS
Refills: 0 | Status: DISCONTINUED | OUTPATIENT
Start: 2023-04-28 | End: 2023-05-03

## 2023-04-28 RX ORDER — ENOXAPARIN SODIUM 100 MG/ML
40 INJECTION SUBCUTANEOUS EVERY 24 HOURS
Refills: 0 | Status: DISCONTINUED | OUTPATIENT
Start: 2023-04-28 | End: 2023-04-28

## 2023-04-28 RX ORDER — MAGNESIUM SULFATE 500 MG/ML
2 VIAL (ML) INJECTION ONCE
Refills: 0 | Status: COMPLETED | OUTPATIENT
Start: 2023-04-28 | End: 2023-04-28

## 2023-04-28 RX ORDER — ONDANSETRON 8 MG/1
4 TABLET, FILM COATED ORAL EVERY 8 HOURS
Refills: 0 | Status: DISCONTINUED | OUTPATIENT
Start: 2023-04-28 | End: 2023-05-03

## 2023-04-28 RX ORDER — POTASSIUM CHLORIDE 20 MEQ
20 PACKET (EA) ORAL
Refills: 0 | Status: DISCONTINUED | OUTPATIENT
Start: 2023-04-28 | End: 2023-04-28

## 2023-04-28 RX ORDER — GLUCAGON INJECTION, SOLUTION 0.5 MG/.1ML
1 INJECTION, SOLUTION SUBCUTANEOUS ONCE
Refills: 0 | Status: DISCONTINUED | OUTPATIENT
Start: 2023-04-28 | End: 2023-05-03

## 2023-04-28 RX ORDER — DEXTROSE 50 % IN WATER 50 %
15 SYRINGE (ML) INTRAVENOUS ONCE
Refills: 0 | Status: DISCONTINUED | OUTPATIENT
Start: 2023-04-28 | End: 2023-05-03

## 2023-04-28 RX ORDER — POTASSIUM CHLORIDE 20 MEQ
40 PACKET (EA) ORAL ONCE
Refills: 0 | Status: COMPLETED | OUTPATIENT
Start: 2023-04-28 | End: 2023-04-28

## 2023-04-28 RX ORDER — DEXTROSE 50 % IN WATER 50 %
12.5 SYRINGE (ML) INTRAVENOUS ONCE
Refills: 0 | Status: DISCONTINUED | OUTPATIENT
Start: 2023-04-28 | End: 2023-05-03

## 2023-04-28 RX ORDER — APIXABAN 2.5 MG/1
5 TABLET, FILM COATED ORAL EVERY 12 HOURS
Refills: 0 | Status: DISCONTINUED | OUTPATIENT
Start: 2023-04-28 | End: 2023-05-03

## 2023-04-28 RX ORDER — GENTAMICIN SULFATE 40 MG/ML
500 VIAL (ML) INJECTION EVERY 24 HOURS
Refills: 0 | Status: COMPLETED | OUTPATIENT
Start: 2023-04-28 | End: 2023-04-29

## 2023-04-28 RX ORDER — ACETAMINOPHEN 500 MG
650 TABLET ORAL EVERY 6 HOURS
Refills: 0 | Status: DISCONTINUED | OUTPATIENT
Start: 2023-04-28 | End: 2023-05-03

## 2023-04-28 RX ORDER — ALBUTEROL 90 UG/1
1 AEROSOL, METERED ORAL DAILY
Refills: 0 | Status: DISCONTINUED | OUTPATIENT
Start: 2023-04-28 | End: 2023-05-03

## 2023-04-28 RX ORDER — LANOLIN ALCOHOL/MO/W.PET/CERES
3 CREAM (GRAM) TOPICAL AT BEDTIME
Refills: 0 | Status: DISCONTINUED | OUTPATIENT
Start: 2023-04-28 | End: 2023-05-03

## 2023-04-28 RX ORDER — IBUPROFEN 200 MG
400 TABLET ORAL THREE TIMES A DAY
Refills: 0 | Status: DISCONTINUED | OUTPATIENT
Start: 2023-04-28 | End: 2023-04-28

## 2023-04-28 RX ADMIN — SODIUM CHLORIDE 1000 MILLILITER(S): 9 INJECTION INTRAMUSCULAR; INTRAVENOUS; SUBCUTANEOUS at 01:14

## 2023-04-28 RX ADMIN — ENOXAPARIN SODIUM 40 MILLIGRAM(S): 100 INJECTION SUBCUTANEOUS at 11:22

## 2023-04-28 RX ADMIN — Medication 8: at 22:22

## 2023-04-28 RX ADMIN — APIXABAN 5 MILLIGRAM(S): 2.5 TABLET, FILM COATED ORAL at 18:53

## 2023-04-28 RX ADMIN — OXYCODONE HYDROCHLORIDE 10 MILLIGRAM(S): 5 TABLET ORAL at 16:40

## 2023-04-28 RX ADMIN — Medication 50 MILLIEQUIVALENT(S): at 09:36

## 2023-04-28 RX ADMIN — Medication 25 GRAM(S): at 12:36

## 2023-04-28 RX ADMIN — OXYCODONE HYDROCHLORIDE 10 MILLIGRAM(S): 5 TABLET ORAL at 06:20

## 2023-04-28 RX ADMIN — SODIUM CHLORIDE 500 MILLILITER(S): 9 INJECTION, SOLUTION INTRAVENOUS at 06:20

## 2023-04-28 RX ADMIN — Medication 40 MILLIEQUIVALENT(S): at 07:03

## 2023-04-28 RX ADMIN — HYDROMORPHONE HYDROCHLORIDE 0.5 MILLIGRAM(S): 2 INJECTION INTRAMUSCULAR; INTRAVENOUS; SUBCUTANEOUS at 12:51

## 2023-04-28 RX ADMIN — HYDROMORPHONE HYDROCHLORIDE 1 MILLIGRAM(S): 2 INJECTION INTRAMUSCULAR; INTRAVENOUS; SUBCUTANEOUS at 01:13

## 2023-04-28 RX ADMIN — HYDROMORPHONE HYDROCHLORIDE 0.5 MILLIGRAM(S): 2 INJECTION INTRAMUSCULAR; INTRAVENOUS; SUBCUTANEOUS at 23:13

## 2023-04-28 RX ADMIN — Medication 50 MILLIEQUIVALENT(S): at 06:27

## 2023-04-28 RX ADMIN — Medication 2: at 12:35

## 2023-04-28 RX ADMIN — OXYCODONE HYDROCHLORIDE 10 MILLIGRAM(S): 5 TABLET ORAL at 07:20

## 2023-04-28 RX ADMIN — Medication 200 MILLIGRAM(S): at 22:53

## 2023-04-28 RX ADMIN — HYDROMORPHONE HYDROCHLORIDE 0.5 MILLIGRAM(S): 2 INJECTION INTRAMUSCULAR; INTRAVENOUS; SUBCUTANEOUS at 12:35

## 2023-04-28 RX ADMIN — HYDROMORPHONE HYDROCHLORIDE 0.5 MILLIGRAM(S): 2 INJECTION INTRAMUSCULAR; INTRAVENOUS; SUBCUTANEOUS at 22:53

## 2023-04-28 RX ADMIN — Medication 12: at 18:03

## 2023-04-28 RX ADMIN — Medication 12 UNIT(S): at 18:53

## 2023-04-28 RX ADMIN — OXYCODONE HYDROCHLORIDE 10 MILLIGRAM(S): 5 TABLET ORAL at 17:36

## 2023-04-28 NOTE — DIETITIAN INITIAL EVALUATION ADULT - NS FNS DIET ORDER
Diet, Consistent Carbohydrate/No Snacks:   DASH/TLC {Sodium & Cholesterol Restricted} (DASH) (04-28-23 @ 04:03)

## 2023-04-28 NOTE — CONSULT NOTE ADULT - SUBJECTIVE AND OBJECTIVE BOX
HISTORY OF PRESENT ILLNESS  43F PMH DM, HTN, HLD, Abdominal Necrotizing fascitis (s/p suprapubic catheter and ostomy bag placement on 11/11/21 at [facility]), p/w several complaints. Pt states that she has several weeks of increasing pain to suprapubic catheter site as well as lower abdomen. Thinks that pus is draining from site. Also feels taht catheter has moved as she can now feel it at surface of her skin. Also w/ vague subjective fever. Also intermittent nausea. Was also in ED 4/7 for pain, Urine cx had  grew ESBL E coli, sensitive to gentamicin. Pt states that she called her PMD (Dr. Sánchez) due to worsening symptoms and he advised her to go to ED.   Unchanged output from ostomy. Admitted ~1mo ago at [facility] for sepsis likely 2/2 cystitis, was treated w/ genta.   Denies SOB, CP, vomiting, URI symptoms, new focal weakness/numbness (has chronic LLE foot drop, ambulates w/ rollator at baseline).    In the ED:  Initial vital signs: T: 99.3F, HR: 129, BP: 169/87, R: 18, SpO2: 97% on RA    Labs: significant for WBC 12.58, Hgb 13.4, Plt 537, Cr 0.82 (baseline 0.6-0.7), lactate 2.4 -->3, K 3.1, glucose 130, corrected Ca 9.4, , UA positive,      CTAP noncon: Diffusely decreased hepatic parenchymal attenuation, which may be seen with steatosis. Contracted gallbladder, limiting evaluation. Unchanged 1.4 cm hypodensity in spleen, likely a cyst. No change in a 1.2 cm probable left adrenal adenoma. Pigtail suprapubic catheter noted within the bladder lumen. Mild   circumferential wall thickening. Mild stranding along the course of the catheter is unchanged. Status post partial left colectomy with left lower quadrant colostomy. No bowel obstruction.. Appendix is normal. Left lower quadrant colostomy with moderate sized fat-containing parastomal hernia. No evidence of incarceration. Degenerative changes of the visualized spine with fusion across the L1-2 disc space.    EKG: NSR w/ Qtc 462    Medications: tylenol 650mg PO x1, gentamicin 500mg IVPB x1, KCL 80meq, dilaudid 1mg IVP x1, 3LNS  Consults: none   i/s/o of Urinary tract infection, SIRS 2/4 for tachycardia and WBC +lactic acidosis & suspected source of infection. Most recent Culture data reveals negative BCx with positive UCx on 4/4/23 which showed 2 various strains of K. pneumoniae (one ESBL and 1 carbapenem resisitant), At that time the patient was seen in the ED (culture obtained via suprapubic catheter), and subsequently discharged home. When culture speciated was contacted and aske dot report to ED for IV ABx however patient never did. Unclear if this culture data represents chronic colonization v acute sepsis 2/2 UTI.   - ID consult in AM for ABx approval  - f/u AM gentamicin level (will represent level 6-14h after last dose)  - c/w gentamicin 500mg IVPB q24h (ideal body weight 61kg v adjusted body weight 77kg) - last dose 2200 4/27  - f/u urine culture   - f/u blood culture   - Tylenol PRN for fevers  - consider urology reevaluation with possible TOV whichwould likely assist in preventing reinfection.    Endocrine seen during last admission March 2023. Discharge recs:    Recommending her to continue Humulin U500 160 units TID and stop admelog unless PP FSG >200, and then take 35 units.  In patient -  - Please give Lantus 60 units at bedtime and Lantus 30 units in the AM.   - Continue lispro  to 36 units before each meal.  Has not had f/u with  Dr Joseph      CAPILLARY BLOOD GLUCOSE & INSULIN RECEIVED  152 mg/dL (04-27 @ 20:38)  130 mg/dL (04-27 @ 21:10)  97 mg/dL (04-27 @ 22:02)  137 mg/dL (04-28 @ 05:24)  73 mg/dL (04-28 @ 07:59)  146 mg/dL (04-28 @ 08:39)  179 mg/dL (04-28 @ 12:23)  174 mg/dL (04-28 @ 12:28)      DIABETES HISTORY  T2DM history:  -Dx in 2005.   -2007: started metformin 1000 mg BID.   -2009: was pregnant and started on glyburide- did not require insulin for pregnancy.   - October 2020: began seeing Dr. Joseph. Eventually, insulin regimen changed to U500  -During a previous admission in July 2021, she was receiving Lantus 75u twice daily and Lispro 40-65 units with each meal.   -last recorded recommended outpatient regimen (clinic note Feb 2023): U500 Humulin R 100 units TID with meals. If glucose levels do not improve, add Admelog 30 units TID with meals. Prior to admission.   - Age at diagnosis:   - Symptoms at time of diagnosis:   - Current Therapy:  - History of other regimens:   - History of hypoglycemia:   - History of DKA/HHS:   - Complications:   - Home FSG:        > Fasting: *** mg/dL.        > Before meals: *** mg/dL.        > Bedtime: *** mg/dL.  - Diet:          > Breakfast:         > Lunch:        > Dinner:        > Snacks:  - Physical activity:    - Outpatient follow-up:     PAST MEDICAL & SURGICAL HISTORY  As per history of present illness.     FAMILY HISTORY  - Diabetes:  - Thyroid:  - Autoimmune:  - Other:    SOCIAL HISTORY  - Work:  - Alcohol:  - Smoking:  - Recreational Drugs:    ALLERGIES  clindamycin (Pruritus)  fish (Hives; Urticaria)  amoxicillin (Unknown)  Bactrim I.V. (Rash (Mod to Severe))  iodine containing compounds (Hives; Anaphylaxis)  shellfish. (Hives; Anaphylaxis)  penicillin (Hives)    CURRENT MEDICATIONS  acetaminophen     Tablet .. 650 milliGRAM(s) Oral every 6 hours PRN  albuterol    90 MICROgram(s) HFA Inhaler 1 Puff(s) Inhalation daily PRN  aluminum hydroxide/magnesium hydroxide/simethicone Suspension 30 milliLiter(s) Oral every 4 hours PRN  dextrose 5%. 1000 milliLiter(s) IV Continuous <Continuous>  dextrose 5%. 1000 milliLiter(s) IV Continuous <Continuous>  dextrose 50% Injectable 12.5 Gram(s) IV Push once  dextrose 50% Injectable 25 Gram(s) IV Push once  dextrose 50% Injectable 25 Gram(s) IV Push once  dextrose Oral Gel 15 Gram(s) Oral once PRN  enoxaparin Injectable 40 milliGRAM(s) SubCutaneous every 24 hours  glucagon  Injectable 1 milliGRAM(s) IntraMuscular once  insulin lispro (ADMELOG) corrective regimen sliding scale   SubCutaneous Before meals and at bedtime  melatonin 3 milliGRAM(s) Oral at bedtime PRN  ondansetron Injectable 4 milliGRAM(s) IV Push every 8 hours PRN  oxyCODONE    IR 10 milliGRAM(s) Oral every 6 hours PRN    REVIEW OF SYSTEMS  Constitutional:  Negative fever, chills or loss of appetite.  Eyes:  Negative blurry vision or double vision.  Cardiovascular:  Negative for chest pain or palpitations.  Respiratory:  Negative for cough, wheezing, or shortness of breath.   Gastrointestinal:  Negative for nausea, vomiting, diarrhea, constipation, or abdominal pain.  Genitourinary:  Negative frequency, urgency or dysuria.  Neurologic:  No headache, confusion, dizziness, lightheadedness.    PHYSICAL EXAM  Vital Signs Last 24 Hrs  T(C): 37 (28 Apr 2023 12:44), Max: 37.4 (27 Apr 2023 20:38)  T(F): 98.6 (28 Apr 2023 12:44), Max: 99.3 (27 Apr 2023 20:38)  HR: 100 (28 Apr 2023 12:44) (87 - 129)  BP: 145/93 (28 Apr 2023 12:44) (145/93 - 178/88)  BP(mean): --  RR: 17 (28 Apr 2023 12:44) (17 - 19)  SpO2: 96% (28 Apr 2023 12:44) (96% - 99%)    Parameters below as of 28 Apr 2023 12:44  Patient On (Oxygen Delivery Method): room air    Constitutional: Awake, alert, in no acute distress.   HEENT: Normocephalic, atraumatic, NAOMI, no proptosis or lid retraction.   Neck: supple, no acanthosis, no thyromegaly or palpable thyroid nodules.  Respiratory: Lungs clear to ausculation bilaterally.   Cardiovascular: regular rhythm, normal S1 and S2, no audible murmurs.   GI: soft, non-tender, non-distended, bowel sounds present, no masses appreciated.  Extremities: No lower extremity edema, peripheral pulses present.   Skin: no rashes.   Psychiatric: AAO x 3. Normal affect/mood.     LABS  CBC - WBC/HGB/HTC/PLT: 11.04/10.8/36.5/418 (04-28-23)  BMP: Na/K/Cl/Bicarb/BUN/Cr/Gluc: 138/3.8/106/22/8/0.60/179 (04-28-23)  Anion Gap: 10 (04-28-23)  eGFR: 114 (04-28-23)  Calcium: 7.6 (04-28-23)  Phosphorus: -- (04-28-23)  Magnesium: -- (04-28-23)  LFT - Alb/Tprot/Tbili/Dbili/AlkPhos/ALT/AST: 2.8/--/<0.2/--/124/9/12 (04-28-23)  PT/aPTT/INR: 12.9/31.0/1.08 (04-27-23)  Thyroid Stimulating Hormone, Serum: 0.851 (04-28-23)     HISTORY OF PRESENT ILLNESS  43F PMH DM, HTN, HLD, Abdominal Necrotizing fascitis (s/p suprapubic catheter and ostomy bag placement on 11/11/21 at [facility]), p/w several complaints. SPT replaced 3/23 by IR. Pt states that she has several weeks of increasing pain to suprapubic catheter site as well as lower abdomen. She states since replacement she feels it has not been draining well, although there is output in the bag. She admits to diffuse lower abdominal pain for the past week. She also admits to purulent fluid/blood coming from umbilicus and around the SPT insertion site. She admits to low grade temps (99), decreased appetite and nausea. She states that her son recently sat on SPT drainage bag and the bag now leaks. She also noted that the stitch from the SPT drain ripped.  Was also in ED 4/7 for pain, Urine cx had  grew ESBL E coli, sensitive to gentamicin. Pt states that she called her PMD (Dr. Sánchez) due to worsening symptoms and he advised her to go to ED. Unchanged output from ostomy. Admitted ~1mo ago at [facility] for sepsis likely 2/2 cystitis, was treated w/ genta. Denies SOB, CP, vomiting, URI symptoms, new focal weakness/numbness (has chronic LLE foot drop, ambulates w/ rollator at baseline).  In the ED:  Initial vital signs: T: 99.3F, HR: 129, BP: 169/87, R: 18, SpO2: 97% on RA  Labs: significant for WBC 12.58, Hgb 13.4, Plt 537, Cr 0.82 (baseline 0.6-0.7), lactate 2.4 -->3, K 3.1, glucose 130, corrected Ca 9.4, , UA positive,    CTAP noncon: Diffusely decreased hepatic parenchymal attenuation, which may be seen with steatosis. Contracted gallbladder, limiting evaluation. Unchanged 1.4 cm hypodensity in spleen, likely a cyst. No change in a 1.2 cm probable left adrenal adenoma. Pigtail suprapubic catheter noted within the bladder lumen. Mild   circumferential wall thickening. Mild stranding along the course of the catheter is unchanged. Status post partial left colectomy with left lower quadrant colostomy. No bowel obstruction.. Appendix is normal. Left lower quadrant colostomy with moderate sized fat-containing parastomal hernia. No evidence of incarceration. Degenerative changes of the visualized spine with fusion across the L1-2 disc space.  EKG: NSR w/ Qtc 462    Sepsis i/s/o of Urinary tract infection, SIRS 2/4 for tachycardia and WBC +lactic acidosis & suspected source of infection. Most recent Culture data reveals negative BCx with positive UCx on 4/4/23 which showed 2 various strains of K. pneumoniae (one ESBL and 1 carbapenem resistant At that time the patient was seen in the ED (culture obtained via suprapubic catheter), and subsequently discharged home. When culture speciated was contacted and asked to report to ED for IV ABx however patient never did. Unclear if this culture data represents chronic colonization v acute sepsis 2/2 UTI. Continued with gentamicin 500mg IVPB q24h. Urology recommending SPT exchange in IR.    Endocrine seen during last admission March 2023. During that admission Lantus 60 units at bedtime and Lantus 30 units in the AM, and lispro  to 36 units before each meal. Hyperglycemic to 200s on this regimen.  At discharge it was recommending her to continue her home Humulin U500 160 units TID and take admelog if PP FSG >200, and then take 35 units. Dr Joseph is her endocrinologist, she has not followed up with her since last discharge.  Since discharge she has been taking Humulin U500 160 unit TID. She frequently has fasting FSG in 500s and 600s. She takes U500 and waits 1-2 hours. If FSG is >235 after that time she takes admelog 35. She reports having to take admelog approx 3 x/week.    Angie seen at the bedside. She reports pain described as "metal jake coming out of her who-ha." She reports some bleeding from umbilicus at home. Urine bag was punctured by son and leaking.    CAPILLARY BLOOD GLUCOSE & INSULIN RECEIVED  4/27 - She reports fasting . She took U500, didn't eat, and by Lunch it was 50 with symptoms. She over treated, but FSG did not go past 150 yesterday.  152 mg/dL (04-27 @ 20:38)  130 mg/dL (04-27 @ 21:10)  97 mg/dL (04-27 @ 22:02)  137 mg/dL (04-28 @ 05:24)  73 mg/dL (04-28 @ 07:59) - No insulin. Drank juice total or 4 glasses and ate parfait.  146 mg/dL (04-28 @ 08:39)  179 mg/dL (04-28 @ 12:23)  174 mg/dL (04-28 @ 12:28) - Lispro 2. Ate chicken, rice, and was drinking coke.    DIABETES HISTORY  T2DM history:  -Dx in 2005.   -2007: started metformin 1000 mg BID.   -2009: was pregnant and started on glyburide- did not require insulin for pregnancy.   - October 2020: began seeing Dr. Joseph. Eventually, insulin regimen changed to U500  -During a previous admission in July 2021, she was receiving Lantus 75u twice daily and Lispro 40-65 units with each meal.   -last recorded recommended outpatient regimen (clinic note Feb 2023): U500 Humulin R 100 units TID with meals. If glucose levels do not improve, add Admelog 30 units TID with meals. Prior to admission.     ALLERGIES  clindamycin (Pruritus)  fish (Hives; Urticaria)  amoxicillin (Unknown)  Bactrim I.V. (Rash (Mod to Severe))  iodine containing compounds (Hives; Anaphylaxis)  shellfish. (Hives; Anaphylaxis)  penicillin (Hives)    CURRENT MEDICATIONS  acetaminophen     Tablet .. 650 milliGRAM(s) Oral every 6 hours PRN  albuterol    90 MICROgram(s) HFA Inhaler 1 Puff(s) Inhalation daily PRN  aluminum hydroxide/magnesium hydroxide/simethicone Suspension 30 milliLiter(s) Oral every 4 hours PRN  dextrose 5%. 1000 milliLiter(s) IV Continuous <Continuous>  dextrose 5%. 1000 milliLiter(s) IV Continuous <Continuous>  dextrose 50% Injectable 12.5 Gram(s) IV Push once  dextrose 50% Injectable 25 Gram(s) IV Push once  dextrose 50% Injectable 25 Gram(s) IV Push once  dextrose Oral Gel 15 Gram(s) Oral once PRN  enoxaparin Injectable 40 milliGRAM(s) SubCutaneous every 24 hours  glucagon  Injectable 1 milliGRAM(s) IntraMuscular once  insulin lispro (ADMELOG) corrective regimen sliding scale   SubCutaneous Before meals and at bedtime  melatonin 3 milliGRAM(s) Oral at bedtime PRN  ondansetron Injectable 4 milliGRAM(s) IV Push every 8 hours PRN  oxyCODONE    IR 10 milliGRAM(s) Oral every 6 hours PRN    REVIEW OF SYSTEMS  Constitutional:  Negative fever, chills or loss of appetite.  Eyes:  Negative blurry vision or double vision.  Cardiovascular:  Negative for chest pain or palpitations.  Respiratory:  Negative for cough, wheezing, or shortness of breath.   Gastrointestinal:  Negative for nausea, vomiting, diarrhea, constipation, + abdominal pain.  Genitourinary:  Negative frequency, urgency or dysuria.  Neurologic:  No headache, confusion, dizziness, lightheadedness.    PHYSICAL EXAM  Vital Signs Last 24 Hrs  T(C): 37 (28 Apr 2023 12:44), Max: 37.4 (27 Apr 2023 20:38)  T(F): 98.6 (28 Apr 2023 12:44), Max: 99.3 (27 Apr 2023 20:38)  HR: 100 (28 Apr 2023 12:44) (87 - 129)  BP: 145/93 (28 Apr 2023 12:44) (145/93 - 178/88)  BP(mean): --  RR: 17 (28 Apr 2023 12:44) (17 - 19)  SpO2: 96% (28 Apr 2023 12:44) (96% - 99%)    Parameters below as of 28 Apr 2023 12:44  Patient On (Oxygen Delivery Method): room air    Constitutional: Awake, alert, female, in no acute distress. OBese.  HEENT: Normocephalic, atraumatic, NAOMI.  Respiratory: Lungs clear to ausculation bilaterally.   Cardiovascular: regular rhythm, normal S1 and S2, no audible murmurs.   GI: soft, mildly tender, non-distended, (+) Ostomy. (+) Suprapubic catheter.   Extremities: No lower extremity edema.  Psychiatric: AAO x 3. Normal affect/mood. .     LABS  CBC - WBC/HGB/HTC/PLT: 11.04/10.8/36.5/418 (04-28-23)  BMP: Na/K/Cl/Bicarb/BUN/Cr/Gluc: 138/3.8/106/22/8/0.60/179 (04-28-23)  Anion Gap: 10 (04-28-23)  eGFR: 114 (04-28-23)  Calcium: 7.6 (04-28-23)  Phosphorus: -- (04-28-23)  Magnesium: -- (04-28-23)  LFT - Alb/Tprot/Tbili/Dbili/AlkPhos/ALT/AST: 2.8/--/<0.2/--/124/9/12 (04-28-23)  PT/aPTT/INR: 12.9/31.0/1.08 (04-27-23)  Thyroid Stimulating Hormone, Serum: 0.851 (04-28-23)

## 2023-04-28 NOTE — DIETITIAN INITIAL EVALUATION ADULT - PROBLEM SELECTOR PLAN 1
i/s/o of Urinary tract infection, SIRS 2/4 for tachycardia and WBC +lactic acidosis & suspected source of infection. Most recent Culture data reveals negative BCx with positive UCx on 4/4/23 which showed 2 various strains of K. pneumoniae (one ESBL and 1 carbapenem resisitant), At that time the patient was seen in the ED (culture obtained via suprapubic catheter), and subsequently discharged home. When culture speciated was contacted and aske dot report to ED for IV ABx however patient never did. Unclear if this culture data represents chronic colonization v acute sepsis 2/2 UTI.   - ID consult in AM for ABx approval  - f/u AM gentamicin level (will represent level 6-14h after last dose)  - c/w gentamicin 500mg IVPB q24h (ideal body weight 61kg v adjusted body weight 77kg) - last dose 2200 4/27  - f/u urine culture   - f/u blood culture   - Tylenol PRN for fevers  - consider urology reevaluation with possible TOV whichwould likely assist in preventing reinfection

## 2023-04-28 NOTE — H&P ADULT - PROBLEM SELECTOR PLAN 5
- f/u Nutrition consult Home Labetalol 100mg qd and Losartan 50mg qd  - Holding i/s/o sepsis for now, resume when appropriate

## 2023-04-28 NOTE — H&P ADULT - PROBLEM SELECTOR PLAN 3
and also suprapubic catheter echanged 3/24 at [facility]. Initially required placement due to abdominal necrotizing fascitis. Which also required skin graft of R medial thigh, R gluteal cleft, R labia.   - Ostomy care and wound care education   - consider surgery eval in AM Bilateral lower lobe pulmonary emboli in segmental and subsegmental branches on the right and subsegmental branches on the left, diagnosed on CT chest during ED visit on 4/7. Was discharged with Eliquis 5mg 2 tablets BID, as confirmed by patient and her pharmacy.  - Will change to Eliquis 5mg BID and discharge pt with this dose

## 2023-04-28 NOTE — H&P ADULT - ATTENDING COMMENTS
44 y/o F with above PMH presents with pain around supra-pubic catheter. She feels cath in snot draining appropriately. Of note, patient with recent admission for catheter associated UTI.    General: AOX3, NAD, lying in bed, no labored breathing on RA  HEENT: AT/NC, no facial asymmetry  Lungs: no crackles, no wheezes  Abdomen: soft,   Extremities    Labs, imaging reviewed    Severe sepsis  cathter associated UTI  Colostomy in place  IDDM    Patient presenting with pain and possible pus from 44 y/o F with above PMH presents with pain around supra-pubic catheter. She feels cath in snot draining appropriately. Of note, patient with recent admission for catheter associated UTI.    General: AOX3, NAD, lying in bed, no labored breathing on RA  HEENT: AT/NC, no facial asymmetry  Lungs: no crackles, no wheezes  Abdomen: soft, tenderness around catheter, no guarding, ostomy in place  Extremities warm, no focal deficit     Labs, imaging reviewed    Severe sepsis  cathter associated UTI  Colostomy in place  IDDM    Patient presenting with pain and possible pus from cathter. Previous culture date reviewed, patient received Gentamycin in ED. Follow-up with ID, Follow-up blood/urine culture  Urology to evaluate cathter  Continue on wound care  A1C 12, patient with FS after resumption of home regimen. Follow-up with Endocrinology  AC

## 2023-04-28 NOTE — PATIENT PROFILE ADULT - FALL HARM RISK - HARM RISK INTERVENTIONS

## 2023-04-28 NOTE — CONSULT NOTE ADULT - PROBLEM SELECTOR RECOMMENDATION 9
- patient states she does not tolerate SPT exchanges bedside and this is a new IR placed tube  - given patient meets sepsis criteria and SPT has not been exchanged would recommend IR consult for SPT exchange. currently has 12fr   - follow up ID recs   - follow up outpatient with urology clinic for determining timing for repeat TOV given patient's ambulation has improved. would need a negative urine culture prior to TOV. [address]   - rest of care as per primary team   - discussed with  team - patient states she does not tolerate SPT exchanges bedside and this is a new IR placed tube  - given patient meets sepsis criteria and SPT has not been exchanged would recommend IR consult for SPT exchange. currently has 12fr   - follow up ID recs   - follow up outpatient with urology clinic for determining timing for repeat TOV given patient's ambulation has improved. would need a negative urine culture prior to TOV. [address], [address] [phone]  - rest of care as per primary team   - discussed with  team

## 2023-04-28 NOTE — CONSULT NOTE ADULT - ASSESSMENT
43F PMH DM, HTN, HLD, stroke? on eliquid bid? Abdominal Necrotizing fascitis (s/p suprapubic catheter and ostomy bag placement on 11/11/21 at [facility]), p/w lower abdomen pain and complaints of drainage from umbilicus.  PT with extensive hx of UTI w/ prior admission in 3/2023 d/c home after receiving IV gentamycin. Recent ED visit on 4/4 with urine cx growing ESBL proteus. On PE, pt with tender lower abdomen w/ no drainage or edema noted in the umbilicus. Mild leukocytosis present. CT a/p pelvis with no acute kidney stones, but possibly cystitis. UTI 2/2 to suprapubic catheter.     Plan   - c/w with gentamycin 500mg q24.   - f/u urology recc, plan for IR replacement of urinary catheter   - Obtain clean urine sample during IR cathter replacement for cx. Current urine cx may represent colonization within the urine bag.      Team 1 following  Note not final until attending attestation

## 2023-04-28 NOTE — CONSULT NOTE ADULT - PROBLEM SELECTOR RECOMMENDATION 9
Type 2 diabetes mellitus  - Please continue lantus *** units at bedtime.   - Continue lispro *** units before each meal.  - Continue lispro moderate / low dose sliding scale before meals and at bedtime.  - Patient's fingerstick glucose goal is 100-180 mg/dL.    - For discharge, patient can ***.    - Patient can follow up at discharge with [facility] by calling [phone] to make an appointment.      Case discussed with Dr. Rose. Primary team updated. Type 2 diabetes mellitus  - Continue lispro moderate dose sliding scale before meals and at bedtime.  - Patient's fingerstick glucose goal is 100-180 mg/dL.    - For discharge, patient can ***.    - Patient can follow up at discharge with [facility] by calling [phone] to make an appointment.      Case discussed with Dr. Rose. Primary team updated.

## 2023-04-28 NOTE — DIETITIAN INITIAL EVALUATION ADULT - OTHER INFO
43F PMH DM, HTN, HLD, Abdominal Necrotizing fascitis (s/p suprapubic catheter and ostomy bag placement on 11/11/21 at [facility]), p/w several complaints. Pt states that she has several weeks of increasing pain to suprapubic catheter site as well as lower abdomen. Admitted for severe sepsis.     Visited pt at bedside this AM on 7UR, awake and alert. Pt reports decreased intake/appetite x 3 days pta given infection and severe nausea/vomiting otherwise good PO intake. Remains with decreased appetite at this time given persistent pain and nausea; had 1 yogurt parfait this morning. No overt fat/muscle wasting noted; reports possible wt loss but unable to quantify. Nutrition related labs reviewed; low K (2.7); low Mg (1.5); HgA1c 12.2; FSBG 146, 73, 97 - insulin regimen ordered. Pt already knowledgeable of diet edu for DM; left nutrition handouts in the room. Reports improved HgA1c (was 15.6 prior) however also reports that infection "makes it hard to ensure good BG control". Endorses abdominal pain, with ostomy to LL abdomen. No chewing/swallowing difficulty. No edema noted; skin is free of PIs; nando scale 19. Endorses severe pain at time of assessment. Made pt aware RD remains available. View full recs below. Clinical nutrition services will continue to follow up pt per organizational policy. Please place new consult for any acute nutrition-related issues that may arise prior to follow up

## 2023-04-28 NOTE — DISCHARGE NOTE PROVIDER - NSDCFUADDAPPT_GEN_ALL_CORE_FT
Please bring your Insurance card, Photo ID and Discharge paperwork to the following appointment:    (1) Please follow up with your Urology Provider Dr. Kinsey Weinberg at [address] [place] [address], [address]. Call [phone] to schedule an appointment.    (2) Follow up at discharge with Dr. Joseph at [facility] by calling [phone] to make an appointment. Please bring your Insurance card, Photo ID and Discharge paperwork to the following appointment:    (1) Please follow up with your Urology Provider Dr. Kinsey Weinberg at [address], [address]. Call [phone] to schedule an appointment.    (2) Follow up at discharge with Dr. Joseph at [facility] by calling [phone] to make an appointment.    (3) Please follow up with an orthopedic surgeon at [address], [place] [address] to get fitted for an ankle foot orthosis (AFO), as recommended by physical therapy. Call [phone] to make an appointment with any orthopedist.    Please bring your Insurance card, Photo ID and Discharge paperwork to the following appointment:    (1) Please follow up with Dr. Fuller (Urologist) on Wednesday 5/17 at 1 pm at [address], [address].    (2) Please follow up with Dr. Joseph (Endocrinologist) at [facility] by calling [phone] to schedule an appointment.    (3) Please follow up with orthopedic surgery at [address], [place] [address] to get fitted for an ankle foot orthosis (AFO), as recommended by physical therapy. Call [phone] to make an appointment with any orthopedist.

## 2023-04-28 NOTE — CONSULT NOTE ADULT - SUBJECTIVE AND OBJECTIVE BOX
Patient is a 43y old  Female who presents with a chief complaint of severe sepsis (28 Apr 2023 13:05)  HPI:  43F PMH DM, HTN, HLD, Abdominal Necrotizing fascitis (s/p suprapubic catheter and ostomy bag placement on 11/11/21 at [facility]), p/w several complaints. Pt states that she has several weeks of increasing pain to suprapubic catheter site as well as lower abdomen. Thinks that pus is draining from site. Also feels taht catheter has moved as she can now feel it at surface of her skin. Also w/ vague subjective fever. Also intermittent nausea. Was also in ED 4/7 for pain, Urine cx had  grew ESBL E coli, sensitive to gentamicin. Pt states that she called her PMD (Dr. Sánchez) due to worsening symptoms and he advised her to go to ED.   Unchanged output from ostomy. Admitted ~1mo ago at [facility] for sepsis likely 2/2 cystitis, was treated w/ genta.   Denies SOB, CP, vomiting, URI symptoms, new focal weakness/numbness (has chronic LLE foot drop, ambulates w/ rollator at baseline).    : As stated above. Urology consulted for SPT management. Patient states that the SPT was last exchanged by IR 03/23/2023. She states since replacement she feels it has not been draining well, although there is output in the bag. She admits to diffuse lower abdominal pain for the past week. She also admits to purulent fluid/blood coming from umbilicus and around the SPT insertion site. She admits to low grade temps (99), decreased appetite and nausea. She states that her son recently sat on SPT drainage bag and the bag now leaks. She also noted that the stitch from the SPT drain ripped.     In the ED:  Initial vital signs: T: 99.3F, HR: 129, BP: 169/87, R: 18, SpO2: 97% on RA    Labs: significant for WBC 12.58, Hgb 13.4, Plt 537, Cr 0.82 (baseline 0.6-0.7), lactate 2.4 -->3, K 3.1, glucose 130, corrected Ca 9.4, , UA positive,      CTAP noncon: Diffusely decreased hepatic parenchymal attenuation, which may be seen with steatosis. Contracted gallbladder, limiting evaluation. Unchanged 1.4 cm hypodensity in spleen, likely a cyst. No change in a 1.2 cm probable left adrenal adenoma. Pigtail suprapubic catheter noted within the bladder lumen. Mild   circumferential wall thickening. Mild stranding along the course of the catheter is unchanged. Status post partial left colectomy with left lower quadrant colostomy. No bowel obstruction.. Appendix is normal. Left lower quadrant colostomy with moderate sized fat-containing parastomal hernia. No evidence of incarceration. Degenerative changes of the visualized spine with fusion across the L1-2 disc space.    EKG: NSR w/ Qtc 462    Medications: tylenol 650mg PO x1, gentamicin 500mg IVPB x1, KCL 80meq, dilaudid 1mg IVP x1, 3LNS  Consults: none    (28 Apr 2023 04:03)      Vital Signs Last 24 Hrs  T(C): 37 (28 Apr 2023 12:44), Max: 37.4 (27 Apr 2023 20:38)  T(F): 98.6 (28 Apr 2023 12:44), Max: 99.3 (27 Apr 2023 20:38)  HR: 100 (28 Apr 2023 12:44) (87 - 129)  BP: 145/93 (28 Apr 2023 12:44) (145/93 - 178/88)  BP(mean): --  RR: 17 (28 Apr 2023 12:44) (17 - 19)  SpO2: 96% (28 Apr 2023 12:44) (96% - 99%)    Parameters below as of 28 Apr 2023 12:44  Patient On (Oxygen Delivery Method): room air      I&O's Summary      PE:  Gen: alert and oriented. no acute distress   Abd: diffusely tender lower abdomen, colostomy in place with stool in bag, non-distended, soft  : 12fr SPT, erythema circumferentially around SPT insertion site, yellow urine in bag. suprapubic tenderness to palpation     LABS:                        10.8   11.04 )-----------( 418      ( 28 Apr 2023 05:24 )             36.5     04-28    138  |  106  |  8   ----------------------------<  179<H>  3.8   |  22  |  0.60    Ca    7.6<L>      28 Apr 2023 12:23  Phos  2.6     04-28  Mg     1.5     04-28    TPro  6.1  /  Alb  2.8<L>  /  TBili  <0.2  /  DBili  x   /  AST  12  /  ALT  9<L>  /  AlkPhos  124<H>  04-28    PT/INR - ( 27 Apr 2023 21:10 )   PT: 12.9 sec;   INR: 1.08          PTT - ( 27 Apr 2023 21:10 )  PTT:31.0 sec  Cultures  Culture Results:   >100,000 CFU/ml Gram Negative Rods  Identification and susceptibility to follow. (04-27 @ 22:09)  Culture Results:   No growth at 12 hours (04-27 @ 21:45)  Culture Results:   No growth at 12 hours (04-27 @ 21:30)      < from: CT Abdomen and Pelvis No Cont (04.27.23 @ 23:51) >  ACC: [id number] EXAM:  CT ABDOMEN AND PELVIS   ORDERED BY: OWEN DIAZ     PROCEDURE DATE:  04/27/2023          INTERPRETATION:  CLINICAL INFORMATION: Ostomy, suprapubic catheter not   draining properly. Sepsis. Evaluate mass/catheter.    COMPARISON: CT abdomen/pelvis 4/4/2023    CONTRAST/COMPLICATIONS:  IV Contrast: None  Oral Contrast: None  Complications: N/A    PROCEDURE:  CT of the Abdomen and Pelvis was performed.  Sagittal and coronal reformats were performed.    FINDINGS:  LOWER CHEST: Clear lung bases. Circumferential wall thickening of the   distal esophagus, unchanged.    LIVER: Hepatomegaly and mild steatosis.  BILE DUCTS: Normal caliber.  GALLBLADDER: Contracted, limiting evaluation.  SPLEEN: Unchanged 1.4 cm hypodensity, likely a cyst.  PANCREAS: Within normal limits.  ADRENALS: No change 1.2 cm left adrenal adenoma. Normal right adrenal   gland.  KIDNEYS/URETERS: No hydronephrosis or hydroureter. Punctate right   interpolar and left lower pole stones.    BLADDER: Pigtail suprapubic catheter noted within the mildly distended   bladder lumen. Mild circumferential wall thickening. No collection or   significant inflammatory changes along the catheter course.  REPRODUCTIVE ORGANS: Uterus and adnexa within normal limits.    BOWEL: Status post partial left colectomy with left lower quadrant   colostomy. No bowel obstruction. Appendix is normal.  PERITONEUM: No ascites.  VESSELS: Minimal atherosclerotic changes. Retroaortic left renal vein.  RETROPERITONEUM/LYMPH NODES: No lymphadenopathy.  ABDOMINAL WALL: Postsurgical changes. Left lower quadrant colostomy with   moderate to large (10 cm) fat-containing parastomal hernia, unchanged.  BONES: Degenerative changes of the visualized spine with fusion across   the L1-2 disc space, stable.    IMPRESSION:    1. Suprapubic catheter noted within the bladder lumen. Mild   circumferential bladder wall thickening may be due to underdistention   versus cystitis.    2. No bowel obstruction.        --- End of Report ---          SHERRY ORTEGA MD; Resident Radiologist  This document has been electronically signed.  FERN REYES MD; Attending Radiologist  This document has been electronically signed. Apr 28 2023  4:31AM    < end of copied text >

## 2023-04-28 NOTE — DISCHARGE NOTE PROVIDER - CARE PROVIDERS DIRECT ADDRESSES
,DirectAddress_Unknown,DirectAddress_Unknown,[email or web address] ,DirectAddress_Unknown,DirectAddress_Unknown,[email or web address],DirectAddress_Unknown

## 2023-04-28 NOTE — CONSULT NOTE ADULT - NS ATTEST RISK PROBLEM GEN_ALL_CORE FT
due to complications of past nectrotizing fasciitis and lack of regular glucose control with great variability.

## 2023-04-28 NOTE — H&P ADULT - NSHPLABSRESULTS_GEN_ALL_CORE
13.4   12.58 )-----------( 537      ( 2023 21:10 )             42.4           138  |  100  |  11  ----------------------------<  130<H>  3.1<L>   |  24  |  0.82    Ca    9.2      2023 21:10    TPro  7.5  /  Alb  3.7  /  TBili  <0.2  /  DBili  x   /  AST  18  /  ALT  11  /  AlkPhos  154<H>                Urinalysis Basic - ( 2023 22:09 )    Color: Yellow / Appearance: SL Cloudy / S.025 / pH: x  Gluc: x / Ketone: NEGATIVE  / Bili: Negative / Urobili: 0.2 E.U./dL   Blood: x / Protein: 30 mg/dL / Nitrite: POSITIVE   Leuk Esterase: Small / RBC: Many /HPF / WBC Many /HPF   Sq Epi: x / Non Sq Epi: x / Bacteria: Many /HPF        PT/INR - ( 2023 21:10 )   PT: 12.9 sec;   INR: 1.08          PTT - ( 2023 21:10 )  PTT:31.0 sec    Lactate Trend   @ 00:03 Lactate:3.0    @ 21:10 Lactate:2.4             CAPILLARY BLOOD GLUCOSE      POCT Blood Glucose.: 97 mg/dL (2023 22:02)        Culture Results:   >100,000 CFU/ml Klebsiella pneumoniae (Carbapenem Resistant)  >100,000 CFU/ml Klebsiella pneumoniae ESBL  Result called to and read back byPaloma Ortiz RN (EMR) 2023 13:44:38 ( @ 02:06)

## 2023-04-28 NOTE — PHYSICAL THERAPY INITIAL EVALUATION ADULT - ADDITIONAL COMMENTS
Pt states she lives in an elevator building, no JOHNN[place]. Uses a rollator for short distances and a wheelchair for longer distances

## 2023-04-28 NOTE — DISCHARGE NOTE PROVIDER - NSDCCPCAREPLAN_GEN_ALL_CORE_FT
PRINCIPAL DISCHARGE DIAGNOSIS  Diagnosis: Sepsis  Assessment and Plan of Treatment: Sepsis is a serious illness that may require intensive care in the hospital. You were admitted for sepsis likely from a urinary tract infection. Urinary tract infections are more common in women. They usually occur in the bladder or urethra, but more serious infections involve the kidney.  A bladder infection may cause pelvic pain, increased urge to urinate, pain with urination, and blood in the urine. A kidney infection may cause back pain, nausea, vomiting, and fever. Common treatment is with antibiotics. Your urine cultures grew bacteria so we gave you antibiotics. Please follow up with your Primary Care Physician within 2 weeks or if your symptoms come back. You will also need to follow up with urology for possible removal of your suprapubic catheter.      SECONDARY DISCHARGE DIAGNOSES  Diagnosis: Diabetes  Assessment and Plan of Treatment: Diabetes mellitus refers to a group of diseases that affect how the body uses blood sugar (glucose). Glucose is an important source of energy for the cells that make up the muscles and tissues. It's also the brain's main source of fuel.  The main cause of diabetes varies by type. But no matter what type of diabetes you have, it can lead to excess sugar in the blood. Too much sugar in the blood can lead to serious health problems.  Chronic diabetes conditions include type 1 diabetes and type 2 diabetes. Potentially reversible diabetes conditions include prediabetes and gestational diabetes. Prediabetes happens when blood sugar levels are higher than normal. But the blood sugar levels aren't high enough to be called diabetes. And prediabetes can lead to diabetes unless steps are taken to prevent it. Gestational diabetes happens during pregnancy. But it may go away after the baby is born.  Your sugars were low upon admission, but with the appropriate food intake and adjusting your insulin regimen, we were able to control them.  Upon discharge please take ___________.   Please follow up with Dr. Joseph, and call her office surrounding questions.     PRINCIPAL DISCHARGE DIAGNOSIS  Diagnosis: Sepsis  Assessment and Plan of Treatment: Sepsis is a serious illness that may require intensive care in the hospital. You were admitted for sepsis likely from a urinary tract infection. Urinary tract infections are more common in women. They usually occur in the bladder or urethra, but more serious infections involve the kidney.  A bladder infection may cause pelvic pain, increased urge to urinate, pain with urination, and blood in the urine. A kidney infection may cause back pain, nausea, vomiting, and fever. Common treatment is with antibiotics. Your urine cultures grew bacteria so we gave you antibiotics. Please follow up with your Primary Care Physician within 2 weeks or if your symptoms come back. You will also need to follow up with urology for possible removal of your suprapubic catheter.      SECONDARY DISCHARGE DIAGNOSES  Diagnosis: Diabetes  Assessment and Plan of Treatment: Diabetes mellitus refers to a group of diseases that affect how the body uses blood sugar (glucose). Glucose is an important source of energy for the cells that make up the muscles and tissues. It's also the brain's main source of fuel.  The main cause of diabetes varies by type. But no matter what type of diabetes you have, it can lead to excess sugar in the blood. Too much sugar in the blood can lead to serious health problems.  Chronic diabetes conditions include type 1 diabetes and type 2 diabetes. Potentially reversible diabetes conditions include prediabetes and gestational diabetes. Prediabetes happens when blood sugar levels are higher than normal. But the blood sugar levels aren't high enough to be called diabetes. And prediabetes can lead to diabetes unless steps are taken to prevent it. Gestational diabetes happens during pregnancy. But it may go away after the baby is born.  Your sugars were low upon admission, but with the appropriate food intake and adjusting your insulin regimen, we were able to control them.  Upon discharge please take Humulin R U-500 40 units three times per day with before meals.   Please follow up with Dr. Joseph, and call her office surrounding questions.     PRINCIPAL DISCHARGE DIAGNOSIS  Diagnosis: Sepsis  Assessment and Plan of Treatment: Sepsis is a serious illness that may require intensive care in the hospital. You were admitted for sepsis likely from a urinary tract infection. Urinary tract infections are more common in women. They usually occur in the bladder or urethra, but more serious infections involve the kidney.  A bladder infection may cause pelvic pain, increased urge to urinate, pain with urination, and blood in the urine. A kidney infection may cause back pain, nausea, vomiting, and fever. Common treatment is with antibiotics. Your urine cultures grew bacteria so you were treated with IV antibiotics, and you completed your antibiotic course in the hospital. You had a CT scan of your abdomen and pelvis to check the positioning of your suprapubic catheter and it showed that the catheter is in the correct place and did not need to be exchanged. You should follow up with your urologist for further care related to your catheter, and for them to try to remove it at some point in the future, as keeping it in place puts you at risk for repeated urinary tract infections. Please also follow up with your Primary Care Physician within 2 weeks of discharge, or sooner if your symptoms return.      SECONDARY DISCHARGE DIAGNOSES  Diagnosis: Diabetes  Assessment and Plan of Treatment: Diabetes mellitus refers to a group of diseases that affect how the body uses blood sugar (glucose). Glucose is an important source of energy for the cells that make up the muscles and tissues. It's also the brain's main source of fuel.  The main cause of diabetes varies by type. But no matter what type of diabetes you have, it can lead to excess sugar in the blood. Too much sugar in the blood can lead to serious health problems.  Chronic diabetes conditions include type 1 diabetes and type 2 diabetes. Potentially reversible diabetes conditions include prediabetes and gestational diabetes. Prediabetes happens when blood sugar levels are higher than normal. But the blood sugar levels aren't high enough to be called diabetes. And prediabetes can lead to diabetes unless steps are taken to prevent it. Gestational diabetes happens during pregnancy. But it may go away after the baby is born.  Your sugars were low upon admission, but with the appropriate food intake and adjusting your insulin regimen, we were able to control them.  Upon discharge please take Humulin R U-500 50 units three times per day with before meals.   Please follow up with Dr. Joseph, and call her office with any additional questions.     PRINCIPAL DISCHARGE DIAGNOSIS  Diagnosis: Sepsis  Assessment and Plan of Treatment: Sepsis is a serious illness that may require intensive care in the hospital. You were admitted for sepsis likely from a urinary tract infection. Urinary tract infections are more common in women. They usually occur in the bladder or urethra, but more serious infections involve the kidney.  A bladder infection may cause pelvic pain, increased urge to urinate, pain with urination, and blood in the urine. A kidney infection may cause back pain, nausea, vomiting, and fever. Common treatment is with antibiotics. Your urine cultures grew bacteria so you were treated with IV antibiotics, and you completed your antibiotic course in the hospital. You had a CT scan of your abdomen and pelvis to check the positioning of your suprapubic catheter and it showed that the catheter is in the correct place and did not need to be exchanged. You have a scheduled followup appointment with urologist Dr. Fuller on 5/17 at 1pm for further care related to your catheter, and to discuss the possibility of removing it at some point in the future, as keeping it in place puts you at risk for repeated urinary tract infections. Please also follow up with your Primary Care Physician within 2 weeks of discharge, or sooner if your symptoms return.      SECONDARY DISCHARGE DIAGNOSES  Diagnosis: Diabetes  Assessment and Plan of Treatment: Diabetes mellitus refers to a group of diseases that affect how the body uses blood sugar (glucose). Glucose is an important source of energy for the cells that make up the muscles and tissues. It's also the brain's main source of fuel.  The main cause of diabetes varies by type. But no matter what type of diabetes you have, it can lead to excess sugar in the blood. Too much sugar in the blood can lead to serious health problems.  Chronic diabetes conditions include type 1 diabetes and type 2 diabetes. Potentially reversible diabetes conditions include prediabetes and gestational diabetes. Prediabetes happens when blood sugar levels are higher than normal. But the blood sugar levels aren't high enough to be called diabetes. And prediabetes can lead to diabetes unless steps are taken to prevent it. Gestational diabetes happens during pregnancy. But it may go away after the baby is born.  Your sugars were low upon admission, but with the appropriate food intake and adjusting your insulin regimen, we were able to control them.  Upon discharge please take Humulin R U-500 50 units three times per day with before meals.   Please follow up with Dr. Joseph, and call her office with any additional questions.     PRINCIPAL DISCHARGE DIAGNOSIS  Diagnosis: Sepsis  Assessment and Plan of Treatment: Sepsis is a serious illness that may require intensive care in the hospital. You were admitted for sepsis likely from a urinary tract infection. Urinary tract infections are more common in women. They usually occur in the bladder or urethra, but more serious infections involve the kidney.  A bladder infection may cause pelvic pain, increased urge to urinate, pain with urination, and blood in the urine. A kidney infection may cause back pain, nausea, vomiting, and fever. Common treatment is with antibiotics. Your urine cultures grew bacteria so you were treated with IV antibiotics, and you completed your antibiotic course in the hospital. You had a CT scan of your abdomen and pelvis to check the positioning of your suprapubic catheter and it showed that the catheter is in the correct place and did not need to be exchanged. You have a scheduled followup appointment with urologist Dr. Fuller on 5/17 at 1pm for further care related to your catheter, and to discuss the possibility of removing it at some point in the future, as keeping it in place puts you at risk for repeated urinary tract infections. Please also follow up with your Primary Care Physician within 2 weeks of discharge, or sooner if your symptoms return.      SECONDARY DISCHARGE DIAGNOSES  Diagnosis: Diabetes  Assessment and Plan of Treatment: Diabetes mellitus refers to a group of diseases that affect how the body uses blood sugar (glucose). Glucose is an important source of energy for the cells that make up the muscles and tissues. It's also the brain's main source of fuel.  The main cause of diabetes varies by type. But no matter what type of diabetes you have, it can lead to excess sugar in the blood. Too much sugar in the blood can lead to serious health problems.  Chronic diabetes conditions include type 1 diabetes and type 2 diabetes. Potentially reversible diabetes conditions include prediabetes and gestational diabetes. Prediabetes happens when blood sugar levels are higher than normal. But the blood sugar levels aren't high enough to be called diabetes. And prediabetes can lead to diabetes unless steps are taken to prevent it. Gestational diabetes happens during pregnancy. But it may go away after the baby is born.  Your sugars were low upon admission, but with the appropriate food intake and adjusting your insulin regimen, we were able to control them.  Upon discharge please take Humulin R U-500 100 units three times per day with before meals.   Please follow up with Dr. Joseph, and call her office with any additional questions.

## 2023-04-28 NOTE — CONSULT NOTE ADULT - ASSESSMENT
43F PMH DM, HTN, HLD, Abdominal Necrotizing fascitis (s/p suprapubic catheter and ostomy bag placement on 11/11/21 at [facility]), p/w several complaints. Pt states that she has several weeks of increasing pain to suprapubic catheter site as well as lower abdomen. Admitted for severe sepsis.       A1C: 12.2 %  BUN: 8  Creatinine: 0.60  GFR: 114  Weight: 100.2  BMI: 34.6 43F PMH DM, HTN, HLD, Abdominal Necrotizing fascitis (s/p suprapubic catheter and ostomy bag placement on 11/11/21 at [facility]), p/w several complaints. Pt states that she has several weeks of increasing pain to suprapubic catheter site as well as lower abdomen. Admitted for severe sepsis.     A1C: 12.2 %  BUN: 8  Creatinine: 0.60  GFR: 114  Weight: 100.2  BMI: 34.6 43F PMH DM, HTN, HLD, Abdominal Necrotizing fascitis (s/p suprapubic catheter and ostomy bag placement on 11/11/21 at [facility]), p/w several complaints. Pt states that she has several weeks of increasing pain to suprapubic catheter site as well as lower abdomen. Admitted for severe sepsis.     Currently patient is requiring minimal insulin. Expect glucose to increase as clinical status improves. Will then start on lantus and lispro, dose tbd.    A1C: 12.2 %  BUN: 8  Creatinine: 0.60  GFR: 114  Weight: 100.2  BMI: 34.6

## 2023-04-28 NOTE — DIETITIAN INITIAL EVALUATION ADULT - OTHER CALCULATIONS
Ideal body weight used to calculate energy needs due to pt's current body weight > 120% ideal body weight. Adjust for viral infection, age.

## 2023-04-28 NOTE — H&P ADULT - ASSESSMENT
43F PMH DM, HTN, HLD, Abdominal Necrotizing fascitis (s/p suprapubic catheter and ostomy bag placement on 11/11/21 at [facility]), p/w several complaints. Pt states that she has several weeks of increasing pain to suprapubic catheter site as well as lower abdomen. Admitted for severe sepsis.  43F PMH DM, HTN, HLD, Abdominal Necrotizing fascitis (s/p suprapubic catheter and ostomy bag placement on 11/11/21 at [facility]), p/w several complaints. Pt states that she has several weeks of increasing pain to suprapubic catheter site as well as lower abdomen. Admitted for severe sepsis.

## 2023-04-28 NOTE — CONSULT NOTE ADULT - ASSESSMENT
43F PMH DM, HTN, HLD, Abdominal Necrotizing fascitis (s/p suprapubic catheter and ostomy bag placement on 11/11/21 at [facility]), p/w several complaints. Pt states that she has several weeks of increasing pain to suprapubic catheter site as well as lower abdomen. Thinks that pus is draining from site. Also feels taht catheter has moved as she can now feel it at surface of her skin. Also w/ vague subjective fever. Also intermittent nausea. Was also in ED 4/7 for pain, Urine cx had  grew ESBL E coli, sensitive to gentamicin. Pt states that she called her PMD (Dr. Sánchez) due to worsening symptoms and he advised her to go to ED.   Urology consulted for SPT management.

## 2023-04-28 NOTE — H&P ADULT - PROBLEM SELECTOR PLAN 2
A1c of 12.5% on previous admission. Previously followed by endo while admitted. O/p endocrinologist is Dr. Joseph [phone]. Per most updated notes, patient to be maintained on humilin U500 160U TID (could use admelog 35units if PP FSG >200). Euglycemic on admission glu 130.   - c/w 128U humilin R (80% of home dose)  - c/w mISS   - FS QID with meals and at bedtime + 2h post prandial  - if 2h PP FS>200 administer 28U admelog  - f/u A1c  - consider endo consult in AM A1c of 12.5% on previous admission. Previously followed by endo while admitted. O/p endocrinologist is Dr. Joseph [phone]. Per most updated notes, patient to be maintained on humilin U500 160U TID (could use admelog 35units if PP FSG >200). Euglycemic on admission glu 130. Hypoglycemic this AM with FSG 70-90.   - Hold home Humulin  - c/w mISS   - FS QID with meals and at bedtime + 2h post prandial  - Endo consulted; appreciate recs

## 2023-04-28 NOTE — CONSULT NOTE ADULT - SUBJECTIVE AND OBJECTIVE BOX
HPI:    43F PMH DM, HTN, HLD, stroke? on eliquid bid? Abdominal Necrotizing fascitis (s/p suprapubic catheter and ostomy bag placement on 21 at [facility]), p/w several complaints. Pt states that she has several weeks of increasing pain to suprapubic catheter site as well as lower abdomenhinks that pus is draining from site 3 days ago with erythema. Pt also reports of umbilical drainage 4 days ago. Initially thought it was associated with increased walkig that day but unsure. Reports of continued nausea, and recent episode of vomiting 2 days ago associated with drinking spoiled milk. Also w/ vague subjective fever. Suprapubic catheter changed every 4 weeks at [facility] by IR team. Not following any urologist outpt.     Hospital visits   -  [facility]: states that she was admitted at [facility] for UTI and was discharged with PICC and IV abx. unable to recall name or duration of abx.   -2023 [facility]: Culture data revealed fluid collected via suprapubic catheter with Klebsiella pneumoniae (carbapenem resistant) and enterococcus faecalis. BCx without growth x 3d. gentamicin 500mg IV q24 hours x 3doses,last day of therapy = 3/24 ( Dr. BROTHERS). States of reduction of lower abdomen pain but then worsening again.   -ED visit  [facility]: pain 2 days around the suprapubic site and in the lower abdomen. Suprapubic cx growing ESBL proteus and CRE proteus.   Denies SOB, CP, vomiting, URI symptoms, new focal weakness/numbness (has chronic LLE foot drop, ambulates w/ rollator at baseline).    REVIEW OF SYSTEMS  CONSTITUTIONAL: No weakness, fevers or chills  EYES/ENT: No visual changes;  No vertigo or throat pain   NECK: No pain or stiffness  RESPIRATORY: No cough, wheezing, hemoptysis; No shortness of breath  CARDIOVASCULAR: No chest pain or palpitations  GASTROINTESTINAL: Lower abdomen pain, with colostomy bag with BM outpt.   GENITOURINARY: No dysuria, frequency or hematuria  NEUROLOGICAL: No numbness or weakness  SKIN: No itching, rashes    Allergies    clindamycin (Pruritus)  fish (Hives; Urticaria)  amoxicillin (Unknown)  iodine containing compounds (Hives; Anaphylaxis)  shellfish. (Hives; Anaphylaxis)  penicillin (Hives)    Intolerances    Bactrim I.V. (Rash (Mod to Severe))    Antimicrobials:  gentamicin   IVPB 500 milliGRAM(s) IV Intermittent every 24 hours      Other Medications:  acetaminophen     Tablet .. 650 milliGRAM(s) Oral every 6 hours PRN  albuterol    90 MICROgram(s) HFA Inhaler 1 Puff(s) Inhalation daily PRN  aluminum hydroxide/magnesium hydroxide/simethicone Suspension 30 milliLiter(s) Oral every 4 hours PRN  dextrose 5%. 1000 milliLiter(s) IV Continuous <Continuous>  dextrose 5%. 1000 milliLiter(s) IV Continuous <Continuous>  dextrose 50% Injectable 12.5 Gram(s) IV Push once  dextrose 50% Injectable 25 Gram(s) IV Push once  dextrose 50% Injectable 25 Gram(s) IV Push once  dextrose Oral Gel 15 Gram(s) Oral once PRN  enoxaparin Injectable 40 milliGRAM(s) SubCutaneous every 24 hours  glucagon  Injectable 1 milliGRAM(s) IntraMuscular once  insulin lispro (ADMELOG) corrective regimen sliding scale   SubCutaneous Before meals and at bedtime  melatonin 3 milliGRAM(s) Oral at bedtime PRN  ondansetron Injectable 4 milliGRAM(s) IV Push every 8 hours PRN  oxyCODONE    IR 10 milliGRAM(s) Oral every 6 hours PRN      FAMILY HISTORY:  FH: diabetes mellitus  father and mother    FH: hypertension  father and mother      PAST MEDICAL & SURGICAL HISTORY:  Essential hypertension      Hyperlipidemia      Diabetes      Obesity      Abdominal hernia      Pre-eclampsia      H/O LEEP      History of D&C      H/O:       H/O ventral hernia repair      H/O exploratory laparotomy        SOCIAL HISTORY:    IMMUNIZATIONS  [] Up to Date		[] Not Up to Date:  Recent Immunizations:	[] No	[] Yes:    Daily Height in cm: 170.18 (2023 20:38)    Daily   Head Circumference:  Vital Signs Last 24 Hrs  T(C): 37 (2023 12:44), Max: 37.4 (2023 20:38)  T(F): 98.6 (2023 12:44), Max: 99.3 (2023 20:38)  HR: 100 (2023 12:44) (87 - 129)  BP: 145/93 (2023 12:44) (145/93 - 178/88)  BP(mean): --  RR: 17 (2023 12:44) (17 - 19)  SpO2: 96% (2023 12:44) (96% - 99%)    Parameters below as of 2023 12:44  Patient On (Oxygen Delivery Method): room air    PHYSICAL EXAM:    Constitutional: WDWN resting comfortably in bed; NAD  Head: NC/AT  Eyes: PERRL, EOMI, anicteric sclera  ENT: no nasal discharge; uvula midline, no oropharyngeal erythema or exudates; MMM  Neck: supple; no JVD or thyromegaly  Respiratory: CTA B/L; no W/R/R, no retractions  Cardiac: +S1/S2; RRR; no M/R/G; PMI non-displaced  Gastrointestinal: aabdomen soft. Suprapubic tenderness to palpation. LLQ ostomy w/ stool output. Suprapubic cath w/o erythema/fluctuance at site. Suprapubic cath outputting urine; no rebound or guarding; +BSx4  Back: spine midline, no bony tenderness or step-offs; no CVAT B/L  Extremities: WWP, no clubbing or cyanosis; no peripheral edema  Musculoskeletal: NROM x4; no joint swelling, tenderness or erythema  Vascular: 2+ radial, femoral, DP/PT pulses B/L  Dermatologic: skin warm, dry and intact; no rashes, wounds, or scars    Lab Results:                        10.8   11.04 )-----------( 418      ( 2023 05:24 )             36.5         138  |  106  |  8   ----------------------------<  179<H>  3.8   |  22  |  0.60    Ca    7.6<L>      2023 12:23  Phos  2.6       Mg     1.5         TPro  6.1  /  Alb  2.8<L>  /  TBili  <0.2  /  DBili  x   /  AST  12  /  ALT  9<L>  /  AlkPhos  124<H>      LIVER FUNCTIONS - ( 2023 05:24 )  Alb: 2.8 g/dL / Pro: 6.1 g/dL / ALK PHOS: 124 U/L / ALT: 9 U/L / AST: 12 U/L / GGT: x           PT/INR - ( 2023 21:10 )   PT: 12.9 sec;   INR: 1.08          PTT - ( 2023 21:10 )  PTT:31.0 sec  Urinalysis Basic - ( 2023 22:09 )    Color: Yellow / Appearance: SL Cloudy / S.025 / pH: x  Gluc: x / Ketone: NEGATIVE  / Bili: Negative / Urobili: 0.2 E.U./dL   Blood: x / Protein: 30 mg/dL / Nitrite: POSITIVE   Leuk Esterase: Small / RBC: Many /HPF / WBC Many /HPF   Sq Epi: x / Non Sq Epi: x / Bacteria: Many /HPF        MICROBIOLOGY  Culture - Blood (23 @ 21:30)  x2   Specimen Source: .Blood Blood-Peripheral  Culture Results:   No growth at 12 hours    Culture - Urine (23 @ 22:09)   Specimen Source: Clean Catch Clean Catch (Midstream)  Culture Results:   >100,000 CFU/ml Gram Negative Rods     Culture - Urine (23 @ 02:06)   Organism: Klepne MDRO  Organism: Klebsiella pneumoniae  Organism: Klebsiella pneumoniae ESBL    Radiology   < from: CT Abdomen and Pelvis No Cont (23 @ 23:51) >  IMPRESSION:    1. Suprapubic catheter noted within the bladder lumen. Mild   circumferential bladder wall thickening may be due to underdistention   versus cystitis.    2. No bowel obstruction.    < end of copied text >

## 2023-04-28 NOTE — H&P ADULT - PROBLEM SELECTOR PLAN 4
Home Labetalol 100mg qd and Losartan 50mg qd  - Holding for now i/s/o sepsis. Hx of abdominal nec fasc with bowel resection and ostomy. Also has suprapubic catheter exchanged 3/24 at [facility]. Initially required placement due to abdominal necrotizing fascitis. Which also required skin graft of R medial thigh, R gluteal cleft, R labia.   - Ostomy care and wound care education

## 2023-04-28 NOTE — DIETITIAN INITIAL EVALUATION ADULT - ADD RECOMMEND
1. Continue current diet order (DASH/TLC; CONSCHO)  2. Monitor Po intake; honor pt food preferences as able  3. Monitor lytes, replete prn   4. Continue insulin/pain regimen per team   5. Monitor GI fxn, skin integrity, wts, chemistry   6. RD to reinforce diet edu at f/u prn   7. RD to remain available for recs adjustment prn or will follow up pt per organizational policy

## 2023-04-28 NOTE — H&P ADULT - HISTORY OF PRESENT ILLNESS
43F PMH DM, HTN, HLD, Abdominal Necrotizing fascitis (s/p suprapubic catheter and ostomy bag placement on 11/11/21 at [facility]), p/w several complaints. Pt states that she has several weeks of increasing pain to suprapubic catheter site as well as lower abdomen. Thinks that pus is draining from site. Also feels taht catheter has moved as she can now feel it at surface of her skin. Also w/ vague subjective fever. Also intermittent nausea. Was also in ED 4/7 for pain, Urine cx had  grew ESBL E coli, sensitive to gentamicin. Pt states that she called her PMD (Dr. Sánchez) due to worsening symptoms and he advised her to go to ED.   Unchanged output from ostomy. Admitted ~1mo ago at [facility] for sepsis likely 2/2 cystitis, was treated w/ genta.   Denies SOB, CP, vomiting, URI symptoms, new focal weakness/numbness (has chronic LLE foot drop, ambulates w/ rollator at baseline).    In the ED:  Initial vital signs: T: 99.3F, HR: 129, BP: 169/87, R: 18, SpO2: 97% on RA    Labs: significant for WBC 12.58, Hgb 13.4, Plt 537, Cr 0.82 (baseline 0.6-0.7), lactate 2.4 -->3, K 3.1, glucose 130, corrected Ca 9.4, , UA positive,      CTAP noncon: Diffusely decreased hepatic parenchymal attenuation, which may be seen with steatosis. Contracted gallbladder, limiting evaluation. Unchanged 1.4 cm hypodensity in spleen, likely a cyst. No change in a 1.2 cm probable left adrenal adenoma. Pigtail suprapubic catheter noted within the bladder lumen. Mild   circumferential wall thickening. Mild stranding along the course of the catheter is unchanged. Status post partial left colectomy with left lower quadrant colostomy. No bowel obstruction.. Appendix is normal. Left lower quadrant colostomy with moderate sized fat-containing parastomal hernia. No evidence of incarceration. Degenerative changes of the visualized spine with fusion across the L1-2 disc space.    EKG: NSR w/ Qtc 462    Medications: tylenol 650mg PO x1, gentamicin 500mg IVPB x1, KCL 80meq, dilaudid 1mg IVP x1, 3LNS  Consults: none    43F PMH DM, HTN, HLD, recent b/l PE on CT chest (4/7, sent home on Eliquis 10mg BID, confirmed with pharmacy), Abdominal Necrotizing fascitis (s/p suprapubic catheter and ostomy bag placement on 11/11/21 at [facility]), p/w several complaints. Pt states that she has several weeks of increasing pain to suprapubic catheter site as well as lower abdomen. Thinks that pus is draining from site. Also feels taht catheter has moved as she can now feel it at surface of her skin. Also w/ vague subjective fever. Also intermittent nausea. Was also in ED 4/7 for pain, Urine cx had  grew ESBL E coli, sensitive to gentamicin. Pt states that she called her PMD (Dr. Sánchez) due to worsening symptoms and he advised her to go to ED.   Unchanged output from ostomy. Admitted ~1mo ago at [facility] for sepsis likely 2/2 cystitis, was treated w/ genta.   Denies SOB, CP, vomiting, URI symptoms, new focal weakness/numbness (has chronic LLE foot drop, ambulates w/ rollator at baseline).    In the ED:  Initial vital signs: T: 99.3F, HR: 129, BP: 169/87, R: 18, SpO2: 97% on RA    Labs: significant for WBC 12.58, Hgb 13.4, Plt 537, Cr 0.82 (baseline 0.6-0.7), lactate 2.4 -->3, K 3.1, glucose 130, corrected Ca 9.4, , UA positive,      CTAP noncon: Diffusely decreased hepatic parenchymal attenuation, which may be seen with steatosis. Contracted gallbladder, limiting evaluation. Unchanged 1.4 cm hypodensity in spleen, likely a cyst. No change in a 1.2 cm probable left adrenal adenoma. Pigtail suprapubic catheter noted within the bladder lumen. Mild   circumferential wall thickening. Mild stranding along the course of the catheter is unchanged. Status post partial left colectomy with left lower quadrant colostomy. No bowel obstruction.. Appendix is normal. Left lower quadrant colostomy with moderate sized fat-containing parastomal hernia. No evidence of incarceration. Degenerative changes of the visualized spine with fusion across the L1-2 disc space.    EKG: NSR w/ Qtc 462    Medications: tylenol 650mg PO x1, gentamicin 500mg IVPB x1, KCL 80meq, dilaudid 1mg IVP x1, 3LNS  Consults: none    43F PMH DM, HTN, HLD, recent b/l PE on CT chest (4/7, sent home on Eliquis 10mg BID, confirmed with pharmacy), Abdominal Necrotizing fascitis (s/p suprapubic catheter and ostomy bag placement on 11/11/21 at [facility]), p/w several complaints. Pt states that she has several weeks of increasing pain to suprapubic catheter site as well as lower abdomen. Thinks that pus is draining from site. Also feels taht catheter has moved as she can now feel it at surface of her skin. Also w/ vague subjective fever. Also intermittent nausea. Was also in ED 4/7 for pain, Urine cx had  grew ESBL E coli, sensitive to gentamicin. Pt states that she called her PMD (Dr. Sánchez) due to worsening symptoms and he advised her to go to ED.   Unchanged output from ostomy. Admitted ~1mo ago at [facility] for sepsis likely 2/2 cystitis, was treated w/ genta.   Denies SOB, CP, vomiting, URI symptoms, new focal weakness/numbness (has chronic LLE foot drop, ambulates w/ rollator at baseline).    Initial vital signs: T: 99.3F, HR: 129, BP: 169/87, R: 18, SpO2: 97% on RA    Labs: significant for WBC 12.58, Hgb 13.4, Plt 537, Cr 0.82 (baseline 0.6-0.7), lactate 2.4 -->3, K 3.1, glucose 130, corrected Ca 9.4, , UA positive,      CTAP noncon: Diffusely decreased hepatic parenchymal attenuation, which may be seen with steatosis. Contracted gallbladder, limiting evaluation. Unchanged 1.4 cm hypodensity in spleen, likely a cyst. No change in a 1.2 cm probable left adrenal adenoma. Pigtail suprapubic catheter noted within the bladder lumen. Mild   circumferential wall thickening. Mild stranding along the course of the catheter is unchanged. Status post partial left colectomy with left lower quadrant colostomy. No bowel obstruction.. Appendix is normal. Left lower quadrant colostomy with moderate sized fat-containing parastomal hernia. No evidence of incarceration. Degenerative changes of the visualized spine with fusion across the L1-2 disc space.    EKG: NSR w/ Qtc 462    Medications: tylenol 650mg PO x1, gentamicin 500mg IVPB x1, KCL 80meq, dilaudid 1mg IVP x1, 3LNS  Consults: none

## 2023-04-28 NOTE — DISCHARGE NOTE PROVIDER - NSDCMRMEDTOKEN_GEN_ALL_CORE_FT
Admelo unit(s) injectable 3 times a day (before meals)  diphenhydrAMINE 50 mg oral tablet: 1 tab(s) orally once a day as needed for  allergy symptoms  Eliquis 5 mg oral tablet: 2 tab(s) orally 2 times a day  HumuLIN N KwikPen: 160 unit(s) subcutaneous 3 times  ibuprofen 400 mg oral tablet: 1 tab(s) orally 3 times a day as needed for  mild pain  labetalol 100 mg oral tablet: 1 dose(s) orally once a day  losartan 50 mg oral tablet: 1 tab(s) orally once a day  oxyCODONE 10 mg oral tablet: 1 tab(s) orally every 6 hours, As Needed -for severe pain MDD:40mg   Ventolin HFA 90 mcg/inh inhalation aerosol: 1 puff(s) inhaled as needed.    apixaban 5 mg oral tablet: 1 tab(s) orally every 12 hours  HumuLIN R (Concentrated) 500 units/mL subcutaneous solution: 40 unit(s) subcutaneous 3 times a day Please take 40 units three times daily with meals  ibuprofen 400 mg oral tablet: 1 tab(s) orally 3 times a day as needed for  mild pain  labetalol 100 mg oral tablet: 1 dose(s) orally once a day  losartan 50 mg oral tablet: 1 tab(s) orally once a day  oxyCODONE 10 mg oral tablet: 1 tab(s) orally every 6 hours, As Needed -for severe pain MDD:40mg   Ventolin HFA 90 mcg/inh inhalation aerosol: 1 puff(s) inhaled as needed.    apixaban 5 mg oral tablet: 1 tab(s) orally every 12 hours  HumuLIN R (Concentrated) 500 units/mL subcutaneous solution: 50 unit(s) subcutaneous 3 times a day Please take 40 units three times daily with meals  ibuprofen 400 mg oral tablet: 1 tab(s) orally 3 times a day as needed for  mild pain  labetalol 100 mg oral tablet: 1 dose(s) orally once a day  losartan 50 mg oral tablet: 1 tab(s) orally once a day  oxyCODONE 10 mg oral tablet: 1 tab(s) orally every 6 hours, As Needed -for severe pain MDD:40mg   Ventolin HFA 90 mcg/inh inhalation aerosol: 1 puff(s) inhaled as needed.    apixaban 5 mg oral tablet: 1 tab(s) orally every 12 hours  HumuLIN R (Concentrated) 500 units/mL subcutaneous solution: 100 unit(s) subcutaneous 3 times a day  ibuprofen 400 mg oral tablet: 1 tab(s) orally 3 times a day as needed for  mild pain  labetalol 100 mg oral tablet: 1 dose(s) orally once a day  losartan 50 mg oral tablet: 1 tab(s) orally once a day  Ventolin HFA 90 mcg/inh inhalation aerosol: 1 puff(s) inhaled as needed.

## 2023-04-28 NOTE — H&P ADULT - PROBLEM SELECTOR PLAN 6
F: none  E: replete prn  N: CC no snacks, DASH/TLC  DVT ppx: lovenox 40mg  Dispo: RMF - f/u Nutrition consult

## 2023-04-28 NOTE — DIETITIAN INITIAL EVALUATION ADULT - PROBLEM SELECTOR PLAN 2
A1c of 12.5% on previous admission. Previously followed by endo while admitted. O/p endocrinologist is Dr. Joseph [phone]. Per most updated notes, patient to be maintained on humilin U500 160U TID (could use admelog 35units if PP FSG >200). Euglycemic on admission glu 130.   - c/w 128U humilin R (80% of home dose)  - c/w mISS   - FS QID with meals and at bedtime + 2h post prandial  - if 2h PP FS>200 administer 28U admelog  - f/u A1c  - consider endo consult in AM

## 2023-04-28 NOTE — DIETITIAN INITIAL EVALUATION ADULT - PERTINENT LABORATORY DATA
04-28    138  |  105  |  9   ----------------------------<  137<H>  2.7<LL>   |  23  |  0.63    Ca    7.8<L>      28 Apr 2023 05:24  Phos  2.6     04-28  Mg     1.5     04-28    TPro  6.1  /  Alb  2.8<L>  /  TBili  <0.2  /  DBili  x   /  AST  12  /  ALT  9<L>  /  AlkPhos  124<H>  04-28  POCT Blood Glucose.: 146 mg/dL (04-28-23 @ 08:39)  A1C with Estimated Average Glucose Result: 12.2 % (04-28-23 @ 05:24)  A1C with Estimated Average Glucose Result: 12.5 % (03-20-23 @ 05:30)

## 2023-04-28 NOTE — DISCHARGE NOTE PROVIDER - NSDCFUSCHEDAPPT_GEN_ALL_CORE_FT
[facility]  UROLOGY [address]  Scheduled Appointment: 05/17/2023

## 2023-04-28 NOTE — H&P ADULT - PROBLEM SELECTOR PLAN 1
i/s/o of Urinary tract infection, SIRS 2/4 for tachycardia and WBC +lactic acidosis & suspected source of infection. Most recent Culture data reveals negative BCx with positive UCx on 4/4/23 which showed 2 various strains of K. pneumoniae (one ESBL and 1 carbapenem resisitant), At that time the patient was seen in the ED (culture obtained via suprapubic catheter), and subsequently discharged home. When culture speciated was contacted and aske dot report to ED for IV ABx however patient never did. Unclear if this culture data represents chronic colonization v acute sepsis 2/2 UTI.   - ID consult in AM for ABx approval  - f/u AM gentamicin level (will represent level 6-14h after last dose)  - c/w gentamicin 500mg IVPB q24h (ideal body weight 61kg v adjusted body weight 77kg) - last dose 2200 4/27  - f/u urine culture   - f/u blood culture   - Tylenol PRN for fevers  - consider urology reevaluation with possible TOV whichwould likely assist in preventing reinfection i/s/o of Urinary tract infection, SIRS 2/4 for tachycardia and WBC +lactic acidosis & suspected source of infection. Most recent Culture data reveals negative BCx with positive UCx on 4/4/23 which showed 2 various strains of K. pneumoniae (one ESBL and 1 carbapenem resisitant), At that time the patient was seen in the ED (culture obtained via suprapubic catheter), and subsequently discharged home. When culture speciated was contacted and aske dot report to ED for IV ABx however patient never did. Unclear if this culture data represents chronic colonization v acute sepsis 2/2 UTI.   - ID consulted  - f/u AM gentamicin level (will represent level 6-14h after last dose)  - c/w gentamicin 500mg IVPB q24h (ideal body weight 61kg v adjusted body weight 77kg) - last dose 2200 4/27  - f/u urine culture   - f/u blood culture   - Tylenol PRN for fevers  - consider urology reevaluation with possible TOV which would likely assist in preventing reinfection i/s/o of Urinary tract infection, SIRS 2/4 for tachycardia and WBC +lactic acidosis & suspected source of infection. Most recent Culture data reveals negative BCx with positive UCx on 4/4/23 which showed 2 various strains of K. pneumoniae (one ESBL and 1 carbapenem resisitant), At that time the patient was seen in the ED (culture obtained via suprapubic catheter), and subsequently discharged home. When culture speciated was contacted and aske dot report to ED for IV ABx however patient never did. Unclear if this culture data represents chronic colonization v acute sepsis 2/2 UTI.   - ID consulted; appreciate recs  - f/u AM gentamicin level (will represent level 6-14h after last dose)  - c/w gentamicin 500mg IVPB q24h (ideal body weight 61kg v adjusted body weight 77kg) - last dose 2200 4/27  - Urine culture likely contaminated due to being collected from drainage bag, according to ID  - f/u blood culture   - Urology consulted for possible TOV or replacement of suprapubic catheter

## 2023-04-28 NOTE — H&P ADULT - NSHPPHYSICALEXAM_GEN_ALL_CORE
.  VITAL SIGNS:  T(C): 37.4 (04-27-23 @ 20:38), Max: 37.4 (04-27-23 @ 20:38)  T(F): 99.3 (04-27-23 @ 20:38), Max: 99.3 (04-27-23 @ 20:38)  HR: 104 (04-28-23 @ 01:27) (104 - 129)  BP: 178/88 (04-28-23 @ 00:00) (169/87 - 178/88)  BP(mean): --  RR: 18 (04-28-23 @ 00:00) (18 - 18)  SpO2: 98% (04-28-23 @ 00:00) (97% - 98%)  Wt(kg): --    PHYSICAL EXAM:    Constitutional: WDWN resting comfortably in bed; NAD  Head: NC/AT  Eyes: PERRL, EOMI, anicteric sclera  ENT: no nasal discharge; uvula midline, no oropharyngeal erythema or exudates; MMM  Neck: supple; no JVD or thyromegaly  Respiratory: CTA B/L; no W/R/R, no retractions  Cardiac: +S1/S2; RRR; no M/R/G; PMI non-displaced  Gastrointestinal: aabdomen soft. Suprapubic tenderness to palpation. LLQ ostomy w/ stool output. Suprapubic cath w/o erythema/fluctuance at site. Suprapubic cath outputting urine; no rebound or guarding; +BSx4  Back: spine midline, no bony tenderness or step-offs; no CVAT B/L  Extremities: WWP, no clubbing or cyanosis; no peripheral edema  Musculoskeletal: NROM x4; no joint swelling, tenderness or erythema  Vascular: 2+ radial, femoral, DP/PT pulses B/L  Dermatologic: skin warm, dry and intact; no rashes, wounds, or scars  Lymphatic: no submandibular or cervical LAD  Neurologic: AAOx3; CNII-XII grossly intact; no focal deficits  Psychiatric: affect and characteristics of appearance, verbalizations, behaviors are appropriate

## 2023-04-28 NOTE — CONSULT NOTE ADULT - ATTENDING COMMENTS
43F h/o abdominal necrotizing fasciitis s/p suprapubic catheter and ostomy bag placement on 11/11/21 at [facility], recent admission for UTI 2/2 MDRO p/w worsening suprapubic pain. Patient usually get suprapubic catheter exchange every 6 weeks at [facility]. In 3/2023, she was admitted for fatigue, found to have UTI (CR K.pneumo and E. faecalis). She was treated with gent x 3 days and holley was exchanged (seen by ID Dr. Forrest). She madalyn 2 days after discharge, she starte to have worsening suprapubic pain. She visited ED on 4/4 for suprabubic pain. Culture taken and she was sent home. UCx grew MDR K.pneumo again. ED called patient but patient didn't get chance to come in until now. Patient denied fever/chills, n/v/d. UCx 4/4 grew CR_K.pneumo (S to getn). CT a/p showed cystitis. Cont gent 500mg IV q24h. Check level 10h after 3rd dose. Recommend IR guided holley exchange.   Anticipate total 5 days of IV abx to treat CAUTI.    Team 1 will follow you.  Case d/w primary team.    Stephanie Branodn MD, MS  Infectious Disease attending  work cell [phone]   For any questions during evening/weekend/holiday, please page ID on call

## 2023-04-28 NOTE — DIETITIAN INITIAL EVALUATION ADULT - PROBLEM SELECTOR PLAN 3
and also suprapubic catheter echanged 3/24 at [facility]. Initially required placement due to abdominal necrotizing fascitis. Which also required skin graft of R medial thigh, R gluteal cleft, R labia.   - Ostomy care and wound care education   - consider surgery eval in AM

## 2023-04-28 NOTE — DISCHARGE NOTE PROVIDER - NSDCCPTREATMENT_GEN_ALL_CORE_FT
PRINCIPAL PROCEDURE  Procedure: CT abdomen and pelvis with contrast  Findings and Treatment: Date of exam: 5/2/2023  Findings:  LOWER CHEST: Within normal limits.  LIVER: Hepatic steatosis.  BILE DUCTS: Normal caliber.  GALLBLADDER: Within normal limits.  SPLEEN: Within normal limits.  PANCREAS: Within normal limits.  ADRENALS: 1 cm left adrenal adenoma.  KIDNEYS/URETERS: Couple of punctate calculi in the right kidney. 2 mm nonobstructive calculus lower pole left kidney. No hydronephrosis in either side.  BLADDER: Tip of the suprapubic catheter within the collapsed bladder lumen.  REPRODUCTIVE ORGANS: Uterus and adnexa within normal limits.  BOWEL: Status post Brandie procedure with left lower quadrant colostomy. No bowel obstruction. Normal appendix.  PERITONEUM: No ascites.  VESSELS: No abdominal aortic aneurysm.  RETROPERITONEUM/LYMPH NODES: No lymphadenopathy.  ABDOMINAL WALL: Postoperative changes with parastomal herniation of the fat in the left lower quadrant. Postoperative changes in the right upper thigh.  BONES: Loss of intervertebral disc space and fusion of L1-L2.  IMPRESSION:  Suprapubic catheter remains within the collapsed bladder lumen.  A few tiny nonobstructive kidney stones bilaterally. No hydronephrosis.

## 2023-04-28 NOTE — DISCHARGE NOTE PROVIDER - HOSPITAL COURSE
#Discharge: do not delete    43F PMH DM, HTN, HLD, Abdominal Necrotizing fascitis (s/p suprapubic catheter and ostomy bag placement on 11/11/21 at [facility]), p/w several complaints. Pt states that she has several weeks of increasing pain to suprapubic catheter site as well as lower abdomen. Admitted for severe sepsis.     #Severe sepsis  #Complicated UTI  Severe sepsis i/s/o of Urinary tract infection, SIRS 2/4 for tachycardia and WBC +lactic acidosis & suspected source of infection. Most recent Culture data reveals negative BCx with positive UCx on 4/4/23 which showed 2 various strains of K. pneumoniae (one ESBL and 1 carbapenem resisitant), At that time the patient was seen in the ED (culture obtained via suprapubic catheter), and subsequently discharged home. When culture speciated was contacted and aske dot report to ED for IV ABx however patient never did. Unclear if this culture data represents chronic colonization v acute sepsis 2/2 UTI.   - ID consulted; appreciate recs  - f/u AM gentamicin level (will represent level 6-14h after last dose)  - c/w gentamicin 500mg IVPB q24h (ideal body weight 61kg v adjusted body weight 77kg) - last dose 2200 4/27  - Urine culture was collected from bag, not valid  - f/u blood culture   - Urology consulted for possible TOV which would likely assist in preventing reinfection.    #Diabetes.   A1c of 12.5% on previous admission. Previously followed by endo while admitted. O/p endocrinologist is Dr. Joseph [phone]. Per most updated notes, patient to be maintained on humilin U500 160U TID (could use admelog 35units if PP FSG >200). Euglycemic on admission glu 130. Hypoglycemic this AM with FSG 70-90.   - Hold home Humulin  - c/w mISS   - FS QID with meals and at bedtime + 2h post prandial  - Endo consulted; appreciate recs.    #Bilateral pulmonary embolism.  Bilateral lower lobe pulmonary emboli in segmental and subsegmental branches on the right and subsegmental branches on the left, diagnosed on CT chest during ED visit on 4/7. Was discharged with Eliquis 5mg 2 tablets BID, as confirmed by patient and her pharmacy.  - Will change to Eliquis 5mg BID and discharge pt with this dose.    #History of creation of ostomy.  Hx of abdominal nec fasc with bowel resection and ostomy. Also has suprapubic catheter exchanged 3/24 at [facility]. Initially required placement due to abdominal necrotizing fascitis. Which also required skin graft of R medial thigh, R gluteal cleft, R labia.   - Ostomy care and wound care education.    #Essential hypertension  Home Labetalol 100mg qd and Losartan 50mg qd  - Holding i/s/o sepsis, resume when appropriate    #Obesity.  Nutrition consulted.    New medications: Eliquis 5mg BID    Stopped medications: Eliquis 10mg BID    Labs to be followed outpatient: Urine culture    Exam to be followed outpatient:    #Discharge: do not delete    43F PMH DM, HTN, HLD, Abdominal Necrotizing fascitis (s/p suprapubic catheter and ostomy bag placement on 11/11/21 at [facility]), p/w several complaints. Pt states that she has several weeks of increasing pain to suprapubic catheter site as well as lower abdomen. Admitted for severe sepsis.     #Severe sepsis  #Complicated UTI  Severe sepsis i/s/o of Urinary tract infection, SIRS 2/4 for tachycardia and WBC +lactic acidosis & suspected source of infection. Most recent Culture data reveals negative BCx with positive UCx on 4/4/23 which showed 2 various strains of K. pneumoniae (one ESBL and 1 carbapenem resisitant), At that time the patient was seen in the ED (culture obtained via suprapubic catheter), and subsequently discharged home. When culture speciated was contacted and aske dot report to ED for IV ABx however patient never did. Unclear if this culture data represents chronic colonization v acute sepsis 2/2 UTI.   - ID consulted; appreciate recs  - c/w gentamicin 500mg IVPB q24h for total 5 days (last day today)  - Urine culture was collected from bag, not valid  - Blood culture NGTD x3 days  - Urology consulted for possible TOV which would likely assist in preventing reinfection.    #Diabetes.   A1c of 12.5% on previous admission. Previously followed by endo while admitted. O/p endocrinologist is Dr. Joseph [phone]. Per most updated notes, patient to be maintained on humilin U500 160U TID (could use admelog 35units if PP FSG >200). Euglycemic on admission glu 130. Hypoglycemic this AM with FSG 70-90.   - Continue lispro moderate dose sliding scale before meals and at bedtime.  - Patient's fingerstick glucose goal is 100-180 mg/dL.    - For discharge, patient can continue Humulin U500 but at decreased dose of 40 units TID, and she will have to titrate further at home based on her FSG.  - Patient can follow up at discharge with [facility] by calling [phone] to make an appointment.      #Bilateral pulmonary embolism.  Bilateral lower lobe pulmonary emboli in segmental and subsegmental branches on the right and subsegmental branches on the left, diagnosed on CT chest during ED visit on 4/7. Was discharged with Eliquis 5mg 2 tablets BID, as confirmed by patient and her pharmacy.  - Will change to Eliquis 5mg BID and discharge pt with this dose.    #History of creation of ostomy.  Hx of abdominal nec fasc with bowel resection and ostomy. Also has suprapubic catheter exchanged 3/24 at [facility]. Initially required placement due to abdominal necrotizing fascitis. Which also required skin graft of R medial thigh, R gluteal cleft, R labia.   - Ostomy care and wound care education.    #Essential hypertension  Home Labetalol 100mg qd and Losartan 50mg qd  - C/w home meds    #Obesity.  Nutrition consulted.    New medications: Eliquis 5mg BID    Stopped medications: Eliquis 10mg BID    Labs to be followed outpatient: Urine culture    Exam to be followed outpatient:   Constitutional: obese female resting comfortably; NAD  HEENT: NC/AT, anicteric sclera, MMM  Neck: supple; no JVD or thyromegaly  Respiratory: CTA B/L; no W/R/R, no retractions  Cardiac: +S1/S2; RRR; no M/R/G  Gastrointestinal: soft, NT/ND; no rebound or guarding; +BS, Suprapubic catheter noted, retracted ~3-4 cm from original location. Draining urine. No discharge, bleeding. Ostomy bag intact with brown stool noted. Skin grafts well healed  Extremities: WWP, no clubbing or cyanosis; no peripheral edema  Musculoskeletal: NROM x4; no joint swelling, tenderness or erythema  Dermatologic: skin warm, dry and intact; no rashes, wounds, or scars  Neurologic: AAOx3, no focal deficits #Discharge: do not delete    43F PMH DM, HTN, HLD, Abdominal Necrotizing fascitis (s/p suprapubic catheter and ostomy bag placement on 11/11/21 at [facility]), p/w several complaints. Pt states that she has several weeks of increasing pain to suprapubic catheter site as well as lower abdomen. Admitted for severe sepsis.     #Severe sepsis  #Complicated UTI  Severe sepsis i/s/o of Urinary tract infection, SIRS 2/4 for tachycardia and WBC +lactic acidosis & UTI as suspected source of infection. Relevant prior Cx: positive UCx 4/4/23 - 2 strains of K. pneumoniae (1 ESBL and 1 carbapenem resistant), At that time the patient was seen in the ED (culture obtained via suprapubic catheter) then dc'd home. When culture speciated pt was contacted and asked to report to ED for IV ABx but she didn't. Unclear if this Cx data represents chronic colonization v acute infection and sepsis 2/2 new UTI. UCx this admission collected from catheter bag and was invalid. ID consulted, rec'd gentamicin 500mg IVPB q24h x5d, course completed inpatient. Pt wanted catheter exchanged but imaging showed it to be in appropriate position so was not exchanged.    #Diabetes.   A1c 12.5% on previous admission. Previously followed by endo while admitted. O/p endocrinologist is Dr. Joseph [phone]. Per most updated notes, patient to be maintained on humulin U500 160U TID (could use admelog 35units if PP FSG >200). Euglycemic on admission glu 130. Sugars were labile inpatient, endo consulted, adjusted regimen inpt and on discharge. Dc'd on humulin U500 50u TID. Plan to f/u w/ Dr. Joseph outpt.    #Bilateral pulmonary embolism.  Bilateral lower lobe pulmonary emboli in segmental and subsegmental branches on the right and subsegmental branches on the left, diagnosed on CT chest during ED visit 4/7. Was discharged with Eliquis 5mg 2 tablets BID, as confirmed by patient and her pharmacy. Changed to Eliquis 5mg BID and discharging pt on this dose.    #History of creation of ostomy.  Hx of abdominal nec fasc with bowel resection and ostomy. Also has suprapubic catheter exchanged 3/24 at [facility]. Initially required placement due to abdominal necrotizing fascitis (which also required skin graft of R medial thigh, R gluteal cleft, R labia). Ostomy care and wound care education performed.    #Essential hypertension  Home Labetalol 100mg qd and Losartan 50mg qd. Held home meds initially then resumed inpatient.    #Obesity.  Nutrition consulted.    New medications: Eliquis 5mg BID    Stopped medications: Eliquis 10mg BID    Labs to be followed outpatient: none    Exam to be followed outpatient:   1. PCP appt  2. Ortho appt for new AFO  3. Urology appt for suprapubic catheter removal and TOV    Constitutional: obese female lying in bed in NAD but uncomfortable-appearing  HEENT: NC/AT, anicteric sclera, MMM  Neck: supple  Respiratory: CTAB  Cardiac: +S1/S2; RRR; no appreciable murmurs  Gastrointestinal: soft, lower abdomen TTP, +BS, +Suprapubic catheter, retracted ~3-4 cm from original location. Draining urine. No discharge, bleeding. Ostomy bag intact with brown stool noted. Skin grafts well healed  Extremities: WWP, no clubbing or cyanosis; no peripheral edema  Musculoskeletal: NROM x4; no joint swelling, tenderness or erythema  Dermatologic: skin warm, dry and intact; no rashes, wounds, or scars  Neurologic: AAOx3, no focal deficits #Discharge: do not delete    43F PMH DM, HTN, HLD, Abdominal Necrotizing fascitis (s/p suprapubic catheter and ostomy bag placement on 11/11/21 at [facility]), p/w several complaints. Pt states that she has several weeks of increasing pain to suprapubic catheter site as well as lower abdomen. Admitted for severe sepsis.     #Severe sepsis  #Complicated UTI  Severe sepsis i/s/o of Urinary tract infection, SIRS 2/4 for tachycardia and WBC +lactic acidosis & UTI as suspected source of infection. Relevant prior Cx: positive UCx 4/4/23 - 2 strains of K. pneumoniae (1 ESBL and 1 carbapenem resistant), At that time the patient was seen in the ED (culture obtained via suprapubic catheter) then dc'd home. When culture speciated pt was contacted and asked to report to ED for IV ABx but she didn't. Unclear if this Cx data represents chronic colonization v acute infection and sepsis 2/2 new UTI. UCx this admission collected from catheter bag and was invalid. ID consulted, rec'd gentamicin 500mg IVPB q24h x5d, course completed inpatient. Pt wanted catheter exchanged but imaging showed it to be in appropriate position so was not exchanged.    #Diabetes.   A1c 12.5% on previous admission. Previously followed by endo while admitted. O/p endocrinologist is Dr. Joseph [phone]. Per most updated notes, patient to be maintained on humulin U500 160U TID (could use admelog 35units if PP FSG >200). Euglycemic on admission glu 130. Sugars were labile inpatient, endo consulted, adjusted regimen inpt and on discharge. Endocrinology was consulted during this admission and patient was placed on insulin, titrated as needed. Dc'd on humulin U500 50u TID. Plan to f/u w/ Dr. Joseph outpt.    #Bilateral pulmonary embolism.  Bilateral lower lobe pulmonary emboli in segmental and subsegmental branches on the right and subsegmental branches on the left, diagnosed on CT chest during ED visit 4/7. Was discharged with Eliquis 5mg 2 tablets BID, as confirmed by patient and her pharmacy. Changed to Eliquis 5mg BID and discharging pt on this dose.    #History of creation of ostomy.  Hx of abdominal nec fasc with bowel resection and ostomy. Also has suprapubic catheter exchanged 3/24 at [facility]. Initially required placement due to abdominal necrotizing fascitis (which also required skin graft of R medial thigh, R gluteal cleft, R labia). Ostomy care and wound care education performed.    #Essential hypertension  Home Labetalol 100mg qd and Losartan 50mg qd. Held home meds initially then resumed labetalol inpatient.  - resume losartan 50mg qd on  discharge     #Obesity.  Nutrition consulted.    New medications: Eliquis 5mg BID    Stopped medications: Eliquis 10mg BID    Labs to be followed outpatient: none    Exam to be followed outpatient:   1. PCP appt  2. Ortho appt for new ankle-foot orthoses  3. Urology appt for suprapubic catheter removal and TOV    Constitutional: obese female lying in bed in NAD but uncomfortable-appearing  HEENT: NC/AT, anicteric sclera, MMM  Neck: supple  Respiratory: CTAB  Cardiac: +S1/S2; RRR; no appreciable murmurs  Gastrointestinal: soft, lower abdomen TTP, +BS, +Suprapubic catheter, retracted ~3-4 cm from original location. Draining urine. No discharge, bleeding. Ostomy bag intact with brown stool noted. Skin grafts well healed  Extremities: WWP, no clubbing or cyanosis; no peripheral edema  Musculoskeletal: NROM x4; no joint swelling, tenderness or erythema  Dermatologic: skin warm, dry and intact; no rashes, wounds, or scars  Neurologic: AAOx3, no focal deficits #Discharge: do not delete    43F PMH DM, HTN, HLD, Abdominal Necrotizing fascitis (s/p suprapubic catheter and ostomy bag placement on 11/11/21 at [facility]), p/w several complaints. Pt states that she has several weeks of increasing pain to suprapubic catheter site as well as lower abdomen. Admitted for severe sepsis.     #Severe sepsis  #Complicated UTI  Severe sepsis i/s/o of Urinary tract infection, SIRS 2/4 for tachycardia and WBC +lactic acidosis & UTI as suspected source of infection. Relevant prior Cx: positive UCx 4/4/23 - 2 strains of K. pneumoniae (1 ESBL and 1 carbapenem resistant), At that time the patient was seen in the ED (culture obtained via suprapubic catheter) then dc'd home. When culture speciated pt was contacted and asked to report to ED for IV ABx but she didn't. Unclear if this Cx data represents chronic colonization v acute infection and sepsis 2/2 new UTI. UCx this admission collected from catheter bag and was invalid. ID consulted, rec'd gentamicin 500mg IVPB q24h x5d, course completed inpatient. Pt wanted catheter exchanged but imaging showed it to be in appropriate position so was not exchanged.    #Diabetes.   A1c 12.5% on previous admission. Previously followed by endo while admitted. O/p endocrinologist is Dr. Joseph [phone]. Per most updated notes, patient to be maintained on humulin U500 160U TID (could use admelog 35units if PP FSG >200). Euglycemic on admission glu 130. Sugars were labile inpatient, endo consulted, adjusted regimen inpt and on discharge. Endocrinology was consulted during this admission and patient was placed on insulin, titrated as needed. Dc'd on humulin U500 50u TID. Plan to f/u w/ Dr. Joseph outpt.    #Bilateral pulmonary embolism.  Bilateral lower lobe pulmonary emboli in segmental and subsegmental branches on the right and subsegmental branches on the left, diagnosed on CT chest during ED visit 4/7. Was discharged with Eliquis 5mg 2 tablets BID, as confirmed by patient and her pharmacy. Changed to Eliquis 5mg BID and discharging pt on this dose.    #History of creation of ostomy.  Hx of abdominal nec fasc with bowel resection and ostomy. Also has suprapubic catheter exchanged 3/24 at [facility]. Initially required placement due to abdominal necrotizing fascitis (which also required skin graft of R medial thigh, R gluteal cleft, R labia). Ostomy care and wound care education performed.    #Essential hypertension  Home Labetalol 100mg qd and Losartan 50mg qd. Held home meds initially then resumed labetalol inpatient.  - resume losartan 50mg qd on discharge     #Obesity.  Nutrition consulted.    New medications: Eliquis 5mg BID    Stopped medications: Eliquis 10mg BID    Labs to be followed outpatient: none    Exam to be followed outpatient:   1. PCP appt  2. Ortho appt for new ankle-foot orthoses  3. Urology appt for suprapubic catheter removal and TOV    Constitutional: obese female lying in bed in NAD but uncomfortable-appearing  HEENT: NC/AT, anicteric sclera, MMM  Neck: supple  Respiratory: CTAB  Cardiac: +S1/S2; RRR; no appreciable murmurs  Gastrointestinal: soft, lower abdomen TTP, +BS, +Suprapubic catheter, retracted ~3-4 cm from original location. Draining urine. No discharge, bleeding. Ostomy bag intact with brown stool noted. Skin grafts well healed  Extremities: WWP, no clubbing or cyanosis; no peripheral edema  Musculoskeletal: NROM x4; no joint swelling, tenderness or erythema  Dermatologic: skin warm, dry and intact; no rashes, wounds, or scars  Neurologic: AAOx3, no focal deficits

## 2023-04-28 NOTE — DIETITIAN INITIAL EVALUATION ADULT - PERTINENT MEDS FT
MEDICATIONS  (STANDING):  dextrose 5%. 1000 milliLiter(s) (100 mL/Hr) IV Continuous <Continuous>  dextrose 5%. 1000 milliLiter(s) (50 mL/Hr) IV Continuous <Continuous>  dextrose 50% Injectable 12.5 Gram(s) IV Push once  dextrose 50% Injectable 25 Gram(s) IV Push once  dextrose 50% Injectable 25 Gram(s) IV Push once  enoxaparin Injectable 40 milliGRAM(s) SubCutaneous every 24 hours  glucagon  Injectable 1 milliGRAM(s) IntraMuscular once  insulin lispro (ADMELOG) corrective regimen sliding scale   SubCutaneous Before meals and at bedtime  magnesium sulfate  IVPB 2 Gram(s) IV Intermittent once  potassium chloride  20 mEq/100 mL IVPB 20 milliEquivalent(s) IV Intermittent every 2 hours    MEDICATIONS  (PRN):  acetaminophen     Tablet .. 650 milliGRAM(s) Oral every 6 hours PRN Temp greater or equal to 38C (100.4F), Mild Pain (1 - 3)  albuterol    90 MICROgram(s) HFA Inhaler 1 Puff(s) Inhalation daily PRN Shortness of Breath and/or Wheezing  aluminum hydroxide/magnesium hydroxide/simethicone Suspension 30 milliLiter(s) Oral every 4 hours PRN Dyspepsia  dextrose Oral Gel 15 Gram(s) Oral once PRN Blood Glucose LESS THAN 70 milliGRAM(s)/deciliter  melatonin 3 milliGRAM(s) Oral at bedtime PRN Insomnia  ondansetron Injectable 4 milliGRAM(s) IV Push every 8 hours PRN Nausea and/or Vomiting  oxyCODONE    IR 10 milliGRAM(s) Oral every 6 hours PRN Severe Pain (7 - 10)

## 2023-04-28 NOTE — DISCHARGE NOTE PROVIDER - PROVIDER TOKENS
PROVIDER:[TOKEN:[[id number]],FOLLOWUP:[2 weeks],ESTABLISHEDPATIENT:[T]],PROVIDER:[TOKEN:[[id number]],FOLLOWUP:[2 weeks],ESTABLISHEDPATIENT:[T]],PROVIDER:[TOKEN:[[id number]],FOLLOWUP:[2 weeks]] PROVIDER:[TOKEN:[[id number]],FOLLOWUP:[2 weeks],ESTABLISHEDPATIENT:[T]],PROVIDER:[TOKEN:[[id number]],FOLLOWUP:[2 weeks],ESTABLISHEDPATIENT:[T]],PROVIDER:[TOKEN:[[id number]],FOLLOWUP:[2 weeks]],PROVIDER:[TOKEN:[[id number]],SCHEDULEDAPPT:[05/17/2023],SCHEDULEDAPPTTIME:[01:00 PM]]

## 2023-04-28 NOTE — PHYSICAL THERAPY INITIAL EVALUATION ADULT - PERTINENT HX OF CURRENT PROBLEM, REHAB EVAL
43F PMH DM, HTN, HLD, Abdominal Necrotizing fascitis (s/p suprapubic catheter and ostomy bag placement on 11/11/21 at [facility]), p/w several complaints. Pt states that she has several weeks of increasing pain to suprapubic catheter site as well as lower abdomen. Thinks that pus is draining from site. Also feels taht catheter has moved as she can now feel it at surface of her skin. Also w/ vague subjective fever. Also intermittent nausea. Was also in ED 4/7 for pain, Urine cx had  grew ESBL E coli, sensitive to gentamicin. Pt states that she called her PMD (Dr. Sánchez) due to worsening symptoms and he advised her to go to ED.   Unchanged output from ostomy. Admitted ~1mo ago at [facility] for sepsis likely 2/2 cystitis, was treated w/ genta.   Denies SOB, CP, vomiting, URI symptoms, new focal weakness/numbness (has chronic LLE foot drop, ambulates w/ rollator at baseline).

## 2023-04-28 NOTE — H&P ADULT - NSICDXPASTMEDICALHX_GEN_ALL_CORE_FT
PAST MEDICAL HISTORY:  Abdominal hernia     Diabetes     Essential hypertension     Hyperlipidemia     Obesity     Pre-eclampsia      PAST MEDICAL HISTORY:  Abdominal hernia     Diabetes     Essential hypertension     Hyperlipidemia     Obesity     Pre-eclampsia     Pulmonary embolism

## 2023-04-28 NOTE — CONSULT NOTE ADULT - NS ATTEND AMEND GEN_ALL_CORE FT
Diabetes consultation is requested for management in this  patient with past history of abdominal necrotizing fasciitis with a suprapubic catheter in place. She is admitted with UTI with probable sepsis. The patient has been on U500 Insulin at home. Glucose was 501 and she took u500 Insulin. Here ins the hospital glucoses have been 152-97 last night and today 060--174.

## 2023-04-28 NOTE — DISCHARGE NOTE PROVIDER - CARE PROVIDER_API CALL
Nisha Joseph)  EndocrinologyMetabDiabetes; Internal Medicine  [address], [address]  [place], [place] [address]  Phone: [phone]  Fax: [phone]  Established Patient  Follow Up Time: 2 weeks    Pelon Sánchez  INFECTIOUS DISEASE  [facility], [address] [place] [address]  Phone: [phone]  Fax: [phone]  Established Patient  Follow Up Time: 2 weeks    Kinsey Villareal)  Urology  [address] [place] [address]  Phone: [phone]  Fax: [phone]  Follow Up Time: 2 weeks   Nisha Joseph)  EndocrinologyMetabDiabetes; Internal Medicine  [address] [address]  Phone: [phone]  Fax: [phone]  Established Patient  Follow Up Time: 2 weeks    Pelon Sánchez  INFECTIOUS DISEASE  [facility], [address] [place] [address]  Phone: [phone]  Fax: [phone]  Established Patient  Follow Up Time: 2 weeks    Kinsey Villareal)  Urology  [address] [place] [address]  Phone: [phone]  Fax: [phone]  Follow Up Time: 2 weeks    Ovidio Fuller)  Urology  [address] [place] [address]  Phone: [phone]  Fax: [phone]  Scheduled Appointment: 05/17/2023 01:00 PM

## 2023-04-29 LAB
ANION GAP SERPL CALC-SCNC: 9 MMOL/L — SIGNIFICANT CHANGE UP (ref 5–17)
BASOPHILS # BLD AUTO: 0.02 K/UL — SIGNIFICANT CHANGE UP (ref 0–0.2)
BASOPHILS NFR BLD AUTO: 0.3 % — SIGNIFICANT CHANGE UP (ref 0–2)
BUN SERPL-MCNC: 12 MG/DL — SIGNIFICANT CHANGE UP (ref 7–23)
CALCIUM SERPL-MCNC: 7.8 MG/DL — LOW (ref 8.4–10.5)
CHLORIDE SERPL-SCNC: 101 MMOL/L — SIGNIFICANT CHANGE UP (ref 96–108)
CO2 SERPL-SCNC: 24 MMOL/L — SIGNIFICANT CHANGE UP (ref 22–31)
CREAT SERPL-MCNC: 0.66 MG/DL — SIGNIFICANT CHANGE UP (ref 0.5–1.3)
CULTURE RESULTS: SIGNIFICANT CHANGE UP
EGFR: 112 ML/MIN/1.73M2 — SIGNIFICANT CHANGE UP
EOSINOPHIL # BLD AUTO: 0.16 K/UL — SIGNIFICANT CHANGE UP (ref 0–0.5)
EOSINOPHIL NFR BLD AUTO: 2 % — SIGNIFICANT CHANGE UP (ref 0–6)
GENTAMICIN SERPL-MCNC: 1.7 UG/ML — SIGNIFICANT CHANGE UP
GLUCOSE BLDC GLUCOMTR-MCNC: 214 MG/DL — HIGH (ref 70–99)
GLUCOSE BLDC GLUCOMTR-MCNC: 236 MG/DL — HIGH (ref 70–99)
GLUCOSE BLDC GLUCOMTR-MCNC: 310 MG/DL — HIGH (ref 70–99)
GLUCOSE BLDC GLUCOMTR-MCNC: 334 MG/DL — HIGH (ref 70–99)
GLUCOSE BLDC GLUCOMTR-MCNC: 347 MG/DL — HIGH (ref 70–99)
GLUCOSE SERPL-MCNC: 257 MG/DL — HIGH (ref 70–99)
HCT VFR BLD CALC: 37.3 % — SIGNIFICANT CHANGE UP (ref 34.5–45)
HGB BLD-MCNC: 11.4 G/DL — LOW (ref 11.5–15.5)
IMM GRANULOCYTES NFR BLD AUTO: 0.4 % — SIGNIFICANT CHANGE UP (ref 0–0.9)
LACTATE SERPL-SCNC: 2 MMOL/L — SIGNIFICANT CHANGE UP (ref 0.5–2)
LYMPHOCYTES # BLD AUTO: 2.76 K/UL — SIGNIFICANT CHANGE UP (ref 1–3.3)
LYMPHOCYTES # BLD AUTO: 34.7 % — SIGNIFICANT CHANGE UP (ref 13–44)
MAGNESIUM SERPL-MCNC: 1.8 MG/DL — SIGNIFICANT CHANGE UP (ref 1.6–2.6)
MCHC RBC-ENTMCNC: 24.5 PG — LOW (ref 27–34)
MCHC RBC-ENTMCNC: 30.6 GM/DL — LOW (ref 32–36)
MCV RBC AUTO: 80.2 FL — SIGNIFICANT CHANGE UP (ref 80–100)
MONOCYTES # BLD AUTO: 0.58 K/UL — SIGNIFICANT CHANGE UP (ref 0–0.9)
MONOCYTES NFR BLD AUTO: 7.3 % — SIGNIFICANT CHANGE UP (ref 2–14)
NEUTROPHILS # BLD AUTO: 4.41 K/UL — SIGNIFICANT CHANGE UP (ref 1.8–7.4)
NEUTROPHILS NFR BLD AUTO: 55.3 % — SIGNIFICANT CHANGE UP (ref 43–77)
NRBC # BLD: 0 /100 WBCS — SIGNIFICANT CHANGE UP (ref 0–0)
PHOSPHATE SERPL-MCNC: 3 MG/DL — SIGNIFICANT CHANGE UP (ref 2.5–4.5)
PLATELET # BLD AUTO: 378 K/UL — SIGNIFICANT CHANGE UP (ref 150–400)
POTASSIUM SERPL-MCNC: 3.8 MMOL/L — SIGNIFICANT CHANGE UP (ref 3.5–5.3)
POTASSIUM SERPL-SCNC: 3.8 MMOL/L — SIGNIFICANT CHANGE UP (ref 3.5–5.3)
RBC # BLD: 4.65 M/UL — SIGNIFICANT CHANGE UP (ref 3.8–5.2)
RBC # FLD: 15.6 % — HIGH (ref 10.3–14.5)
SODIUM SERPL-SCNC: 134 MMOL/L — LOW (ref 135–145)
SPECIMEN SOURCE: SIGNIFICANT CHANGE UP
WBC # BLD: 7.96 K/UL — SIGNIFICANT CHANGE UP (ref 3.8–10.5)
WBC # FLD AUTO: 7.96 K/UL — SIGNIFICANT CHANGE UP (ref 3.8–10.5)

## 2023-04-29 PROCEDURE — 99233 SBSQ HOSP IP/OBS HIGH 50: CPT | Mod: GC

## 2023-04-29 PROCEDURE — 99232 SBSQ HOSP IP/OBS MODERATE 35: CPT

## 2023-04-29 RX ORDER — HYDROMORPHONE HYDROCHLORIDE 2 MG/ML
0.5 INJECTION INTRAMUSCULAR; INTRAVENOUS; SUBCUTANEOUS ONCE
Refills: 0 | Status: DISCONTINUED | OUTPATIENT
Start: 2023-04-29 | End: 2023-04-29

## 2023-04-29 RX ORDER — HYDROMORPHONE HYDROCHLORIDE 2 MG/ML
2 INJECTION INTRAMUSCULAR; INTRAVENOUS; SUBCUTANEOUS EVERY 8 HOURS
Refills: 0 | Status: DISCONTINUED | OUTPATIENT
Start: 2023-04-29 | End: 2023-05-03

## 2023-04-29 RX ORDER — INSULIN LISPRO 100/ML
8 VIAL (ML) SUBCUTANEOUS ONCE
Refills: 0 | Status: COMPLETED | OUTPATIENT
Start: 2023-04-29 | End: 2023-04-29

## 2023-04-29 RX ADMIN — Medication 8: at 23:59

## 2023-04-29 RX ADMIN — OXYCODONE HYDROCHLORIDE 10 MILLIGRAM(S): 5 TABLET ORAL at 13:29

## 2023-04-29 RX ADMIN — OXYCODONE HYDROCHLORIDE 10 MILLIGRAM(S): 5 TABLET ORAL at 08:24

## 2023-04-29 RX ADMIN — OXYCODONE HYDROCHLORIDE 10 MILLIGRAM(S): 5 TABLET ORAL at 16:34

## 2023-04-29 RX ADMIN — Medication 200 MILLIGRAM(S): at 23:41

## 2023-04-29 RX ADMIN — Medication 8: at 17:16

## 2023-04-29 RX ADMIN — HYDROMORPHONE HYDROCHLORIDE 0.5 MILLIGRAM(S): 2 INJECTION INTRAMUSCULAR; INTRAVENOUS; SUBCUTANEOUS at 09:34

## 2023-04-29 RX ADMIN — OXYCODONE HYDROCHLORIDE 10 MILLIGRAM(S): 5 TABLET ORAL at 23:44

## 2023-04-29 RX ADMIN — Medication 8 UNIT(S): at 02:10

## 2023-04-29 RX ADMIN — OXYCODONE HYDROCHLORIDE 10 MILLIGRAM(S): 5 TABLET ORAL at 07:24

## 2023-04-29 RX ADMIN — HYDROMORPHONE HYDROCHLORIDE 0.5 MILLIGRAM(S): 2 INJECTION INTRAMUSCULAR; INTRAVENOUS; SUBCUTANEOUS at 07:48

## 2023-04-29 RX ADMIN — APIXABAN 5 MILLIGRAM(S): 2.5 TABLET, FILM COATED ORAL at 17:15

## 2023-04-29 RX ADMIN — Medication 4: at 09:33

## 2023-04-29 RX ADMIN — HYDROMORPHONE HYDROCHLORIDE 0.5 MILLIGRAM(S): 2 INJECTION INTRAMUSCULAR; INTRAVENOUS; SUBCUTANEOUS at 16:47

## 2023-04-29 RX ADMIN — HYDROMORPHONE HYDROCHLORIDE 0.5 MILLIGRAM(S): 2 INJECTION INTRAMUSCULAR; INTRAVENOUS; SUBCUTANEOUS at 13:49

## 2023-04-29 RX ADMIN — HYDROMORPHONE HYDROCHLORIDE 0.5 MILLIGRAM(S): 2 INJECTION INTRAMUSCULAR; INTRAVENOUS; SUBCUTANEOUS at 08:03

## 2023-04-29 RX ADMIN — HYDROMORPHONE HYDROCHLORIDE 0.5 MILLIGRAM(S): 2 INJECTION INTRAMUSCULAR; INTRAVENOUS; SUBCUTANEOUS at 19:21

## 2023-04-29 RX ADMIN — HYDROMORPHONE HYDROCHLORIDE 2 MILLIGRAM(S): 2 INJECTION INTRAMUSCULAR; INTRAVENOUS; SUBCUTANEOUS at 19:17

## 2023-04-29 RX ADMIN — Medication 4: at 13:29

## 2023-04-29 RX ADMIN — APIXABAN 5 MILLIGRAM(S): 2.5 TABLET, FILM COATED ORAL at 06:30

## 2023-04-29 NOTE — PROGRESS NOTE ADULT - PROBLEM SELECTOR PLAN 2
A1c of 12.5% on previous admission. Previously followed by endo while admitted. O/p endocrinologist is Dr. Joseph [phone]. Per most updated notes, patient to be maintained on humilin U500 160U TID (could use admelog 35units if PP FSG >200). Euglycemic on admission glu 130. Hypoglycemic this AM with FSG 70-90.   - Hold home Humulin  - c/w mISS   - FS QID with meals and at bedtime + 2h post prandial  - Endo consulted; appreciate recs

## 2023-04-29 NOTE — PROGRESS NOTE ADULT - PROBLEM SELECTOR PLAN 4
Hx of abdominal nec fasc with bowel resection and ostomy. Also has suprapubic catheter exchanged 3/24 at [facility]. Initially required placement due to abdominal necrotizing fascitis. Which also required skin graft of R medial thigh, R gluteal cleft, R labia.   - Ostomy care and wound care education

## 2023-04-29 NOTE — PROGRESS NOTE ADULT - PROBLEM SELECTOR PLAN 3
Bilateral lower lobe pulmonary emboli in segmental and subsegmental branches on the right and subsegmental branches on the left, diagnosed on CT chest during ED visit on 4/7. Was discharged with Eliquis 5mg 2 tablets BID, as confirmed by patient and her pharmacy.  - Will change to Eliquis 5mg BID and discharge pt with this dose

## 2023-04-29 NOTE — PROGRESS NOTE ADULT - ATTENDING COMMENTS
Patient was seen and examined at bedside on 4/29/2023 at 1030 am. Patient reports overall feeling improved but still has some abdominal pain. Denies SOB, CP. ROS is otherwise negative. Vitals, labwork and pertinent imaging reviewed. Physical exam - NAD, AAO x 4, PERRLA, EOMI, supple neck, chest - CTA b/l, CV - s1s2, no m/r/g, abd - soft, mild TTP in suprapubic region, + BS, + suprapubic catheter, ext - wwp, psych - normal affect, skin - no rash    Plan  -Will need SPT exchange with IR on Monday  -ID consulted - appreciate further recs, c/w Gentamycin  -Endocrine consulted - need rec as patient has been hyperglycemic

## 2023-04-29 NOTE — PROGRESS NOTE ADULT - SUBJECTIVE AND OBJECTIVE BOX
CC: Patient is a 43y old  Female who presents with a chief complaint of severe sepsis     INTERVAL EVENTS: This AM, pt partially pulled out suprapubic catheter. On assessing of pt, pt tearful, complaining of pain around site. No guarding, no rebound tenderness, however painful to palpation around catheter which appeared to be 4cm removed from original position. No blood/bleeding, no discharge from site. Dilaudid 0.5mg IVP ordered for analgesia, pt to undergo IR removal when available.   SUBJECTIVE / INTERVAL HPI: Patient seen and examined at bedside.   ROS: negative unless otherwise stated above.    VITAL SIGNS:  Vital Signs Last 24 Hrs  T(C): 36.9 (2023 05:20), Max: 37 (2023 12:44)  T(F): 98.5 (2023 05:20), Max: 98.6 (2023 12:44)  HR: 88 (2023 05:20) (88 - 100)  BP: 139/77 (2023 05:20) (139/77 - 151/91)  BP(mean): 113 (2023 20:35) (113 - 113)  RR: 19 (2023 05:20) (17 - 19)  SpO2: 99% (2023 05:20) (96% - 99%)    Parameters below as of 2023 05:20  Patient On (Oxygen Delivery Method): room air          23 @ 07:01  -  23 @ 07:00  --------------------------------------------------------  IN: 0 mL / OUT: 1550 mL / NET: -1550 mL        PHYSICAL EXAM:    General: NAD  HEENT: MMM  Neck: supple  Cardiovascular: +S1/S2; RRR  Respiratory: CTA B/L; no W/R/R  Gastrointestinal: soft, NT/ND  Extremities: WWP; no edema, clubbing or cyanosis  Vascular: 2+ radial, DP/PT pulses B/L  Neurological: AAOx3; no focal deficits    MEDICATIONS:  MEDICATIONS  (STANDING):  apixaban 5 milliGRAM(s) Oral every 12 hours  dextrose 5%. 1000 milliLiter(s) (100 mL/Hr) IV Continuous <Continuous>  dextrose 5%. 1000 milliLiter(s) (50 mL/Hr) IV Continuous <Continuous>  dextrose 50% Injectable 12.5 Gram(s) IV Push once  dextrose 50% Injectable 25 Gram(s) IV Push once  dextrose 50% Injectable 25 Gram(s) IV Push once  gentamicin   IVPB 500 milliGRAM(s) IV Intermittent every 24 hours  glucagon  Injectable 1 milliGRAM(s) IntraMuscular once  insulin lispro (ADMELOG) corrective regimen sliding scale   SubCutaneous Before meals and at bedtime    MEDICATIONS  (PRN):  acetaminophen     Tablet .. 650 milliGRAM(s) Oral every 6 hours PRN Temp greater or equal to 38C (100.4F), Mild Pain (1 - 3)  albuterol    90 MICROgram(s) HFA Inhaler 1 Puff(s) Inhalation daily PRN Shortness of Breath and/or Wheezing  aluminum hydroxide/magnesium hydroxide/simethicone Suspension 30 milliLiter(s) Oral every 4 hours PRN Dyspepsia  dextrose Oral Gel 15 Gram(s) Oral once PRN Blood Glucose LESS THAN 70 milliGRAM(s)/deciliter  melatonin 3 milliGRAM(s) Oral at bedtime PRN Insomnia  ondansetron Injectable 4 milliGRAM(s) IV Push every 8 hours PRN Nausea and/or Vomiting  oxyCODONE    IR 10 milliGRAM(s) Oral every 6 hours PRN Severe Pain (7 - 10)      ALLERGIES:  Allergies    clindamycin (Pruritus)  fish (Hives; Urticaria)  amoxicillin (Unknown)  iodine containing compounds (Hives; Anaphylaxis)  shellfish. (Hives; Anaphylaxis)  penicillin (Hives)    Intolerances    Bactrim I.V. (Rash (Mod to Severe))      LABS:                        11.4   7.96  )-----------( 378      ( 2023 08:25 )             37.3     -    138  |  106  |  8   ----------------------------<  179<H>  3.8   |  22  |  0.60    Ca    7.6<L>      2023 12:23  Phos  2.6     -  Mg     1.5     -    TPro  6.1  /  Alb  2.8<L>  /  TBili  <0.2  /  DBili  x   /  AST  12  /  ALT  9<L>  /  AlkPhos  124<H>  04    PT/INR - ( 2023 21:10 )   PT: 12.9 sec;   INR: 1.08          PTT - ( 2023 21:10 )  PTT:31.0 sec  Urinalysis Basic - ( 2023 22:09 )    Color: Yellow / Appearance: SL Cloudy / S.025 / pH: x  Gluc: x / Ketone: NEGATIVE  / Bili: Negative / Urobili: 0.2 E.U./dL   Blood: x / Protein: 30 mg/dL / Nitrite: POSITIVE   Leuk Esterase: Small / RBC: Many /HPF / WBC Many /HPF   Sq Epi: x / Non Sq Epi: x / Bacteria: Many /HPF      CAPILLARY BLOOD GLUCOSE      POCT Blood Glucose.: 214 mg/dL (2023 08:41)      RADIOLOGY & ADDITIONAL TESTS: Reviewed. CC: Patient is a 43y old  Female who presents with a chief complaint of severe sepsis     INTERVAL EVENTS: This AM, pt partially pulled out suprapubic catheter. On assessing of pt, pt tearful, complaining of pain around site. No guarding, no rebound tenderness, however painful to palpation around catheter which appeared to be 4cm removed from original position. No blood/bleeding, no discharge from site. Bladder scan revealed no retention, cath continues to drain urine. Dilaudid 0.5mg IVP ordered for analgesia x2. Pt to undergo IR removal when available.   SUBJECTIVE / INTERVAL HPI: Patient seen and examined at bedside.   ROS: negative unless otherwise stated above.    VITAL SIGNS:  Vital Signs Last 24 Hrs  T(C): 36.9 (2023 05:20), Max: 37 (2023 12:44)  T(F): 98.5 (2023 05:20), Max: 98.6 (2023 12:44)  HR: 88 (2023 05:20) (88 - 100)  BP: 139/77 (2023 05:20) (139/77 - 151/91)  BP(mean): 113 (2023 20:35) (113 - 113)  RR: 19 (2023 05:20) (17 - 19)  SpO2: 99% (2023 05:20) (96% - 99%)    Parameters below as of 2023 05:20  Patient On (Oxygen Delivery Method): room air          23 @ 07:01  -  23 @ 07:00  --------------------------------------------------------  IN: 0 mL / OUT: 1550 mL / NET: -1550 mL        PHYSICAL EXAM:  General: Alert and oriented x 3. Slightly obese habitus. Tearful and in pain this AM.   Eyes: EOMI. Anicteric.  HEENT: Moist mucous membranes. No scleral icterus. No cervical lymphadenopathy.  Lungs: Clear to auscultation bilaterally. No accessory muscle use.  Cardiovascular: Regular rate and rhythm. No murmur. No JVD.  Abdomen: Soft, non-tender and non-distended. Suprapubic catheter noted, retracted ~3-4 cm from original location. Draining urine. No discharge, bleeding. Ostomy bag intact with brown stool noted.   Extremities: No edema. Non-tender.  Skin: No rashes or lesions. Warm.  Neurologic: No focal neurological deficits. CN II-XII grossly intact, but not individually tested.  Psychiatric: Cooperative. Appropriate mood and affect.      MEDICATIONS:  MEDICATIONS  (STANDING):  apixaban 5 milliGRAM(s) Oral every 12 hours  dextrose 5%. 1000 milliLiter(s) (100 mL/Hr) IV Continuous <Continuous>  dextrose 5%. 1000 milliLiter(s) (50 mL/Hr) IV Continuous <Continuous>  dextrose 50% Injectable 12.5 Gram(s) IV Push once  dextrose 50% Injectable 25 Gram(s) IV Push once  dextrose 50% Injectable 25 Gram(s) IV Push once  gentamicin   IVPB 500 milliGRAM(s) IV Intermittent every 24 hours  glucagon  Injectable 1 milliGRAM(s) IntraMuscular once  insulin lispro (ADMELOG) corrective regimen sliding scale   SubCutaneous Before meals and at bedtime    MEDICATIONS  (PRN):  acetaminophen     Tablet .. 650 milliGRAM(s) Oral every 6 hours PRN Temp greater or equal to 38C (100.4F), Mild Pain (1 - 3)  albuterol    90 MICROgram(s) HFA Inhaler 1 Puff(s) Inhalation daily PRN Shortness of Breath and/or Wheezing  aluminum hydroxide/magnesium hydroxide/simethicone Suspension 30 milliLiter(s) Oral every 4 hours PRN Dyspepsia  dextrose Oral Gel 15 Gram(s) Oral once PRN Blood Glucose LESS THAN 70 milliGRAM(s)/deciliter  melatonin 3 milliGRAM(s) Oral at bedtime PRN Insomnia  ondansetron Injectable 4 milliGRAM(s) IV Push every 8 hours PRN Nausea and/or Vomiting  oxyCODONE    IR 10 milliGRAM(s) Oral every 6 hours PRN Severe Pain (7 - 10)      ALLERGIES:  Allergies    clindamycin (Pruritus)  fish (Hives; Urticaria)  amoxicillin (Unknown)  iodine containing compounds (Hives; Anaphylaxis)  shellfish. (Hives; Anaphylaxis)  penicillin (Hives)    Intolerances    Bactrim I.V. (Rash (Mod to Severe))      LABS:                        11.4   7.96  )-----------( 378      ( 2023 08:25 )             37.3         138  |  106  |  8   ----------------------------<  179<H>  3.8   |  22  |  0.60    Ca    7.6<L>      2023 12:23  Phos  2.6       Mg     1.5         TPro  6.1  /  Alb  2.8<L>  /  TBili  <0.2  /  DBili  x   /  AST  12  /  ALT  9<L>  /  AlkPhos  124<H>      PT/INR - ( 2023 21:10 )   PT: 12.9 sec;   INR: 1.08          PTT - ( 2023 21:10 )  PTT:31.0 sec  Urinalysis Basic - ( 2023 22:09 )    Color: Yellow / Appearance: SL Cloudy / S.025 / pH: x  Gluc: x / Ketone: NEGATIVE  / Bili: Negative / Urobili: 0.2 E.U./dL   Blood: x / Protein: 30 mg/dL / Nitrite: POSITIVE   Leuk Esterase: Small / RBC: Many /HPF / WBC Many /HPF   Sq Epi: x / Non Sq Epi: x / Bacteria: Many /HPF      CAPILLARY BLOOD GLUCOSE      POCT Blood Glucose.: 214 mg/dL (2023 08:41)      RADIOLOGY & ADDITIONAL TESTS: Reviewed.

## 2023-04-29 NOTE — PROGRESS NOTE ADULT - PROBLEM SELECTOR PLAN 1
i/s/o of Urinary tract infection, SIRS 2/4 for tachycardia and WBC +lactic acidosis & suspected source of infection. Most recent Culture data reveals negative BCx with positive UCx on 4/4/23 which showed 2 various strains of K. pneumoniae (one ESBL and 1 carbapenem resisitant), At that time the patient was seen in the ED (culture obtained via suprapubic catheter), and subsequently discharged home. When culture speciated was contacted and aske dot report to ED for IV ABx however patient never did. Unclear if this culture data represents chronic colonization v acute sepsis 2/2 UTI.   - ID consulted; appreciate recs  - f/u AM gentamicin level (will represent level 6-14h after last dose)  - c/w gentamicin 500mg IVPB q24h (ideal body weight 61kg v adjusted body weight 77kg) - last dose 2200 4/27  - Urine culture likely contaminated due to being collected from drainage bag, according to ID  - f/u blood culture   - Urology consulted for possible TOV or replacement of suprapubic catheter i/s/o of Urinary tract infection, SIRS 2/4 for tachycardia and WBC +lactic acidosis & suspected source of infection. Most recent Culture data reveals negative BCx with positive UCx on 4/4/23 which showed 2 various strains of K. pneumoniae (one ESBL and 1 carbapenem resistant), At that time the patient was seen in the ED (culture obtained via suprapubic catheter), and subsequently discharged home. When culture speciated was contacted and asked ot report to ED for IV ABx however patient never did. Unclear if this culture data represents chronic colonization v acute sepsis 2/2 UTI.   - ID consulted; appreciate recs  - f/u AM gentamicin level on 4/29 (will represent level 6-14h after last dose)  - c/w gentamicin 500mg IVPB q24h (ideal body weight 61kg v adjusted body weight 77kg) - last dose 2200 4/27  - Urine culture likely contaminated due to being collected from drainage bag, according to ID  - f/u blood culture   - Urology consulted for possible TOV or replacement of suprapubic catheter, will plan for IR removal when team is available

## 2023-04-29 NOTE — PROGRESS NOTE ADULT - ASSESSMENT
43F h/o abdominal necrotizing fasciitis s/p suprapubic catheter and ostomy bag placement on 11/11/21 at [place], recent admission for UTI 2/2 MDRO p/w worsening suprapubic pain, likely CAUTI.  4/4 UCx grew MDR K.pneumo.      - cont gent 500mg IV q24h.  Check gent level 10h after 3rd dose (at 8am tomorrow)  - plan for 5 days of therapy  - recommend holley exchange by IR given holley coming out and in setting of new UTI  - send new UAX when new holley inserted    Team 1 will follow you.  Case d/w primary team.    Stephanie Brandon MD, MS  Infectious Disease attending  work cell [phone]   For any questions during evening/weekend/holiday, please page ID on call

## 2023-04-29 NOTE — PROGRESS NOTE ADULT - PROBLEM SELECTOR PLAN 5
Home Labetalol 100mg qd and Losartan 50mg qd  - Holding i/s/o sepsis for now, resume when appropriate

## 2023-04-29 NOTE — PROGRESS NOTE ADULT - ASSESSMENT
43F PMH DM, HTN, HLD, Abdominal Necrotizing fascitis (s/p suprapubic catheter and ostomy bag placement on 11/11/21 at [facility]), p/w several complaints. Pt states that she has several weeks of increasing pain to suprapubic catheter site as well as lower abdomen. Admitted for severe sepsis.

## 2023-04-29 NOTE — PROGRESS NOTE ADULT - SUBJECTIVE AND OBJECTIVE BOX
INFECTIOUS DISEASES CONSULT FOLLOW-UP NOTE    INTERVAL HPI/OVERNIGHT EVENTS:  no event overnight  patient says     ROS:   Constitutional, eyes, ENT, cardiovascular, respiratory, gastrointestinal, genitourinary, integumentary, neurological, psychiatric and heme/lymph are otherwise negative other than noted above       ANTIBIOTICS/RELEVANT:    MEDICATIONS  (STANDING):  apixaban 5 milliGRAM(s) Oral every 12 hours  dextrose 5%. 1000 milliLiter(s) (50 mL/Hr) IV Continuous <Continuous>  dextrose 5%. 1000 milliLiter(s) (100 mL/Hr) IV Continuous <Continuous>  dextrose 50% Injectable 25 Gram(s) IV Push once  dextrose 50% Injectable 12.5 Gram(s) IV Push once  dextrose 50% Injectable 25 Gram(s) IV Push once  gentamicin   IVPB 500 milliGRAM(s) IV Intermittent every 24 hours  glucagon  Injectable 1 milliGRAM(s) IntraMuscular once  insulin lispro (ADMELOG) corrective regimen sliding scale   SubCutaneous Before meals and at bedtime    MEDICATIONS  (PRN):  acetaminophen     Tablet .. 650 milliGRAM(s) Oral every 6 hours PRN Temp greater or equal to 38C (100.4F), Mild Pain (1 - 3)  albuterol    90 MICROgram(s) HFA Inhaler 1 Puff(s) Inhalation daily PRN Shortness of Breath and/or Wheezing  aluminum hydroxide/magnesium hydroxide/simethicone Suspension 30 milliLiter(s) Oral every 4 hours PRN Dyspepsia  dextrose Oral Gel 15 Gram(s) Oral once PRN Blood Glucose LESS THAN 70 milliGRAM(s)/deciliter  melatonin 3 milliGRAM(s) Oral at bedtime PRN Insomnia  ondansetron Injectable 4 milliGRAM(s) IV Push every 8 hours PRN Nausea and/or Vomiting  oxyCODONE    IR 10 milliGRAM(s) Oral every 6 hours PRN Severe Pain (7 - 10)        Vital Signs Last 24 Hrs  T(C): 36.6 (2023 12:19), Max: 36.9 (2023 05:20)  T(F): 97.9 (2023 12:19), Max: 98.5 (2023 05:20)  HR: 97 (2023 12:19) (88 - 100)  BP: 169/94 (2023 12:19) (139/77 - 169/94)  BP(mean): 113 (2023 20:35) (113 - 113)  RR: 18 (2023 12:19) (18 - 19)  SpO2: 98% (2023 12:19) (98% - 99%)    Parameters below as of 2023 12:19  Patient On (Oxygen Delivery Method): room air        23 @ 07:01  -  23 @ 07:00  --------------------------------------------------------  IN: 0 mL / OUT: 1550 mL / NET: -1550 mL    23 @ 07:  -  23 @ 15:41  --------------------------------------------------------  IN: 0 mL / OUT: 375 mL / NET: -375 mL      PHYSICAL EXAM:  Constitutional: alert, NAD  Eyes: the sclera and conjunctiva were normal.   ENT: the ears and nose were normal in appearance.   Neck: the appearance of the neck was normal and the neck was supple.   Pulmonary: no respiratory distress and lungs were clear to auscultation bilaterally.   Heart: heart rate was normal and rhythm regular, normal S1 and S2  Vascular:. there was no peripheral edema  Abdomen: normal bowel sounds, soft, non-tender  Neurological: no focal deficits.   Psychiatric: the affect was normal        LABS:                        11.4   7.96  )-----------( 378      ( 2023 08:25 )             37.3     -    134<L>  |  101  |  12  ----------------------------<  257<H>  3.8   |  24  |  0.66    Ca    7.8<L>      2023 08:25  Phos  3.0     -  Mg     1.8         TPro  6.1  /  Alb  2.8<L>  /  TBili  <0.2  /  DBili  x   /  AST  12  /  ALT  9<L>  /  AlkPhos  124<H>  04-28    PT/INR - ( 2023 21:10 )   PT: 12.9 sec;   INR: 1.08          PTT - ( 2023 21:10 )  PTT:31.0 sec  Urinalysis Basic - ( 2023 22:09 )    Color: Yellow / Appearance: SL Cloudy / S.025 / pH: x  Gluc: x / Ketone: NEGATIVE  / Bili: Negative / Urobili: 0.2 E.U./dL   Blood: x / Protein: 30 mg/dL / Nitrite: POSITIVE   Leuk Esterase: Small / RBC: Many /HPF / WBC Many /HPF   Sq Epi: x / Non Sq Epi: x / Bacteria: Many /HPF        MICROBIOLOGY:      RADIOLOGY & ADDITIONAL STUDIES:  Reviewed INFECTIOUS DISEASES CONSULT FOLLOW-UP NOTE    INTERVAL HPI/OVERNIGHT EVENTS:  no event overnight  patient says while being cleaned, suprapubic catheter was accidentally pulled and 4cm came out  reports same suprapubic tenderness     ROS:   Constitutional, eyes, ENT, cardiovascular, respiratory, gastrointestinal, genitourinary, integumentary, neurological, psychiatric and heme/lymph are otherwise negative other than noted above       ANTIBIOTICS/RELEVANT:    MEDICATIONS  (STANDING):  apixaban 5 milliGRAM(s) Oral every 12 hours  dextrose 5%. 1000 milliLiter(s) (50 mL/Hr) IV Continuous <Continuous>  dextrose 5%. 1000 milliLiter(s) (100 mL/Hr) IV Continuous <Continuous>  dextrose 50% Injectable 25 Gram(s) IV Push once  dextrose 50% Injectable 12.5 Gram(s) IV Push once  dextrose 50% Injectable 25 Gram(s) IV Push once  gentamicin   IVPB 500 milliGRAM(s) IV Intermittent every 24 hours  glucagon  Injectable 1 milliGRAM(s) IntraMuscular once  insulin lispro (ADMELOG) corrective regimen sliding scale   SubCutaneous Before meals and at bedtime    MEDICATIONS  (PRN):  acetaminophen     Tablet .. 650 milliGRAM(s) Oral every 6 hours PRN Temp greater or equal to 38C (100.4F), Mild Pain (1 - 3)  albuterol    90 MICROgram(s) HFA Inhaler 1 Puff(s) Inhalation daily PRN Shortness of Breath and/or Wheezing  aluminum hydroxide/magnesium hydroxide/simethicone Suspension 30 milliLiter(s) Oral every 4 hours PRN Dyspepsia  dextrose Oral Gel 15 Gram(s) Oral once PRN Blood Glucose LESS THAN 70 milliGRAM(s)/deciliter  melatonin 3 milliGRAM(s) Oral at bedtime PRN Insomnia  ondansetron Injectable 4 milliGRAM(s) IV Push every 8 hours PRN Nausea and/or Vomiting  oxyCODONE    IR 10 milliGRAM(s) Oral every 6 hours PRN Severe Pain (7 - 10)        Vital Signs Last 24 Hrs  T(C): 36.6 (2023 12:19), Max: 36.9 (2023 05:20)  T(F): 97.9 (2023 12:19), Max: 98.5 (2023 05:20)  HR: 97 (2023 12:19) (88 - 100)  BP: 169/94 (2023 12:19) (139/77 - 169/94)  BP(mean): 113 (2023 20:35) (113 - 113)  RR: 18 (2023 12:19) (18 - 19)  SpO2: 98% (2023 12:19) (98% - 99%)    Parameters below as of 2023 12:19  Patient On (Oxygen Delivery Method): room air        23 @ 07:01  -  23 @ 07:00  --------------------------------------------------------  IN: 0 mL / OUT: 1550 mL / NET: -1550 mL    23 @ 07:  -  23 @ 15:41  --------------------------------------------------------  IN: 0 mL / OUT: 375 mL / NET: -375 mL      PHYSICAL EXAM:  Constitutional: alert, NAD  Eyes: the sclera and conjunctiva were normal.   ENT: the ears and nose were normal in appearance.   Neck: the appearance of the neck was normal and the neck was supple.   Pulmonary: no respiratory distress and lungs were clear to auscultation bilaterally.   Heart: heart rate was normal and rhythm regular, normal S1 and S2  Vascular:. there was no peripheral edema  Abdomen: normal bowel sounds, soft, ostomy bag, suprapubic cath, ttp       LABS:                        11.4   7.96  )-----------( 378      ( 2023 08:25 )             37.3     -29    134<L>  |  101  |  12  ----------------------------<  257<H>  3.8   |  24  |  0.66    Ca    7.8<L>      2023 08:25  Phos  3.0     04-29  Mg     1.8     0429    TPro  6.1  /  Alb  2.8<L>  /  TBili  <0.2  /  DBili  x   /  AST  12  /  ALT  9<L>  /  AlkPhos  124<H>      PT/INR - ( 2023 21:10 )   PT: 12.9 sec;   INR: 1.08          PTT - ( 2023 21:10 )  PTT:31.0 sec  Urinalysis Basic - ( 2023 22:09 )    Color: Yellow / Appearance: SL Cloudy / S.025 / pH: x  Gluc: x / Ketone: NEGATIVE  / Bili: Negative / Urobili: 0.2 E.U./dL   Blood: x / Protein: 30 mg/dL / Nitrite: POSITIVE   Leuk Esterase: Small / RBC: Many /HPF / WBC Many /HPF   Sq Epi: x / Non Sq Epi: x / Bacteria: Many /HPF        MICROBIOLOGY:      RADIOLOGY & ADDITIONAL STUDIES:  Reviewed

## 2023-04-30 DIAGNOSIS — E11.65 TYPE 2 DIABETES MELLITUS WITH HYPERGLYCEMIA: ICD-10-CM

## 2023-04-30 LAB
ANION GAP SERPL CALC-SCNC: 9 MMOL/L — SIGNIFICANT CHANGE UP (ref 5–17)
BLD GP AB SCN SERPL QL: POSITIVE — SIGNIFICANT CHANGE UP
BUN SERPL-MCNC: 14 MG/DL — SIGNIFICANT CHANGE UP (ref 7–23)
CALCIUM SERPL-MCNC: 7.8 MG/DL — LOW (ref 8.4–10.5)
CHLORIDE SERPL-SCNC: 101 MMOL/L — SIGNIFICANT CHANGE UP (ref 96–108)
CO2 SERPL-SCNC: 24 MMOL/L — SIGNIFICANT CHANGE UP (ref 22–31)
CREAT SERPL-MCNC: 0.64 MG/DL — SIGNIFICANT CHANGE UP (ref 0.5–1.3)
EGFR: 112 ML/MIN/1.73M2 — SIGNIFICANT CHANGE UP
GENTAMICIN TROUGH SERPL-MCNC: 4.3 UG/ML — CRITICAL HIGH (ref 0–2)
GLUCOSE BLDC GLUCOMTR-MCNC: 218 MG/DL — HIGH (ref 70–99)
GLUCOSE BLDC GLUCOMTR-MCNC: 248 MG/DL — HIGH (ref 70–99)
GLUCOSE BLDC GLUCOMTR-MCNC: 311 MG/DL — HIGH (ref 70–99)
GLUCOSE BLDC GLUCOMTR-MCNC: 338 MG/DL — HIGH (ref 70–99)
GLUCOSE BLDC GLUCOMTR-MCNC: 341 MG/DL — HIGH (ref 70–99)
GLUCOSE SERPL-MCNC: 275 MG/DL — HIGH (ref 70–99)
HCT VFR BLD CALC: 39.3 % — SIGNIFICANT CHANGE UP (ref 34.5–45)
HGB BLD-MCNC: 11.8 G/DL — SIGNIFICANT CHANGE UP (ref 11.5–15.5)
MAGNESIUM SERPL-MCNC: 1.7 MG/DL — SIGNIFICANT CHANGE UP (ref 1.6–2.6)
MCHC RBC-ENTMCNC: 24.2 PG — LOW (ref 27–34)
MCHC RBC-ENTMCNC: 30 GM/DL — LOW (ref 32–36)
MCV RBC AUTO: 80.7 FL — SIGNIFICANT CHANGE UP (ref 80–100)
NRBC # BLD: 0 /100 WBCS — SIGNIFICANT CHANGE UP (ref 0–0)
PHOSPHATE SERPL-MCNC: 2.6 MG/DL — SIGNIFICANT CHANGE UP (ref 2.5–4.5)
PLATELET # BLD AUTO: 361 K/UL — SIGNIFICANT CHANGE UP (ref 150–400)
POTASSIUM SERPL-MCNC: 4.4 MMOL/L — SIGNIFICANT CHANGE UP (ref 3.5–5.3)
POTASSIUM SERPL-SCNC: 4.4 MMOL/L — SIGNIFICANT CHANGE UP (ref 3.5–5.3)
RBC # BLD: 4.87 M/UL — SIGNIFICANT CHANGE UP (ref 3.8–5.2)
RBC # FLD: 15.6 % — HIGH (ref 10.3–14.5)
RH IG SCN BLD-IMP: NEGATIVE — SIGNIFICANT CHANGE UP
SARS-COV-2 RNA SPEC QL NAA+PROBE: SIGNIFICANT CHANGE UP
SODIUM SERPL-SCNC: 134 MMOL/L — LOW (ref 135–145)
WBC # BLD: 8.73 K/UL — SIGNIFICANT CHANGE UP (ref 3.8–10.5)
WBC # FLD AUTO: 8.73 K/UL — SIGNIFICANT CHANGE UP (ref 3.8–10.5)

## 2023-04-30 PROCEDURE — 99231 SBSQ HOSP IP/OBS SF/LOW 25: CPT

## 2023-04-30 PROCEDURE — 99233 SBSQ HOSP IP/OBS HIGH 50: CPT | Mod: GC

## 2023-04-30 RX ORDER — SENNA PLUS 8.6 MG/1
2 TABLET ORAL AT BEDTIME
Refills: 0 | Status: DISCONTINUED | OUTPATIENT
Start: 2023-04-30 | End: 2023-05-03

## 2023-04-30 RX ORDER — HYDROMORPHONE HYDROCHLORIDE 2 MG/ML
1 INJECTION INTRAMUSCULAR; INTRAVENOUS; SUBCUTANEOUS ONCE
Refills: 0 | Status: DISCONTINUED | OUTPATIENT
Start: 2023-04-30 | End: 2023-04-30

## 2023-04-30 RX ORDER — INSULIN GLARGINE 100 [IU]/ML
20 INJECTION, SOLUTION SUBCUTANEOUS AT BEDTIME
Refills: 0 | Status: DISCONTINUED | OUTPATIENT
Start: 2023-04-30 | End: 2023-05-01

## 2023-04-30 RX ORDER — POLYETHYLENE GLYCOL 3350 17 G/17G
17 POWDER, FOR SOLUTION ORAL DAILY
Refills: 0 | Status: DISCONTINUED | OUTPATIENT
Start: 2023-04-30 | End: 2023-05-03

## 2023-04-30 RX ORDER — GENTAMICIN SULFATE 40 MG/ML
500 VIAL (ML) INJECTION EVERY 24 HOURS
Refills: 0 | Status: DISCONTINUED | OUTPATIENT
Start: 2023-04-30 | End: 2023-05-01

## 2023-04-30 RX ORDER — INSULIN LISPRO 100/ML
2 VIAL (ML) SUBCUTANEOUS
Refills: 0 | Status: DISCONTINUED | OUTPATIENT
Start: 2023-04-30 | End: 2023-05-01

## 2023-04-30 RX ADMIN — Medication 8: at 22:26

## 2023-04-30 RX ADMIN — HYDROMORPHONE HYDROCHLORIDE 2 MILLIGRAM(S): 2 INJECTION INTRAMUSCULAR; INTRAVENOUS; SUBCUTANEOUS at 19:26

## 2023-04-30 RX ADMIN — HYDROMORPHONE HYDROCHLORIDE 2 MILLIGRAM(S): 2 INJECTION INTRAMUSCULAR; INTRAVENOUS; SUBCUTANEOUS at 15:42

## 2023-04-30 RX ADMIN — APIXABAN 5 MILLIGRAM(S): 2.5 TABLET, FILM COATED ORAL at 06:45

## 2023-04-30 RX ADMIN — Medication 2 UNIT(S): at 17:59

## 2023-04-30 RX ADMIN — INSULIN GLARGINE 20 UNIT(S): 100 INJECTION, SOLUTION SUBCUTANEOUS at 22:26

## 2023-04-30 RX ADMIN — OXYCODONE HYDROCHLORIDE 10 MILLIGRAM(S): 5 TABLET ORAL at 09:50

## 2023-04-30 RX ADMIN — HYDROMORPHONE HYDROCHLORIDE 2 MILLIGRAM(S): 2 INJECTION INTRAMUSCULAR; INTRAVENOUS; SUBCUTANEOUS at 07:45

## 2023-04-30 RX ADMIN — Medication 4: at 09:34

## 2023-04-30 RX ADMIN — HYDROMORPHONE HYDROCHLORIDE 1 MILLIGRAM(S): 2 INJECTION INTRAMUSCULAR; INTRAVENOUS; SUBCUTANEOUS at 22:25

## 2023-04-30 RX ADMIN — HYDROMORPHONE HYDROCHLORIDE 1 MILLIGRAM(S): 2 INJECTION INTRAMUSCULAR; INTRAVENOUS; SUBCUTANEOUS at 23:25

## 2023-04-30 RX ADMIN — HYDROMORPHONE HYDROCHLORIDE 1 MILLIGRAM(S): 2 INJECTION INTRAMUSCULAR; INTRAVENOUS; SUBCUTANEOUS at 02:55

## 2023-04-30 RX ADMIN — Medication 200 MILLIGRAM(S): at 22:26

## 2023-04-30 RX ADMIN — OXYCODONE HYDROCHLORIDE 10 MILLIGRAM(S): 5 TABLET ORAL at 19:49

## 2023-04-30 RX ADMIN — Medication 8: at 17:59

## 2023-04-30 RX ADMIN — Medication 4: at 13:33

## 2023-04-30 RX ADMIN — OXYCODONE HYDROCHLORIDE 10 MILLIGRAM(S): 5 TABLET ORAL at 15:57

## 2023-04-30 RX ADMIN — APIXABAN 5 MILLIGRAM(S): 2.5 TABLET, FILM COATED ORAL at 18:00

## 2023-04-30 RX ADMIN — OXYCODONE HYDROCHLORIDE 10 MILLIGRAM(S): 5 TABLET ORAL at 00:44

## 2023-04-30 RX ADMIN — HYDROMORPHONE HYDROCHLORIDE 2 MILLIGRAM(S): 2 INJECTION INTRAMUSCULAR; INTRAVENOUS; SUBCUTANEOUS at 06:45

## 2023-04-30 NOTE — PROGRESS NOTE ADULT - SUBJECTIVE AND OBJECTIVE BOX
INTERVAL HPI/OVERNIGHT EVENTS:    Patient is a 43y old  Female who presents with a chief complaint of severe sepsis (29 Apr 2023 15:41)    She has had significant hyperglycemia despite poor appetite and poor PO intake. She has mostly had ice and a few bites of food. She attributes her high blood sugars to stress and pain.     CONSTITUTIONAL:  Negative fever or chills, feels well, good appetite  EYES:  Negative  blurry vision or double vision  CARDIOVASCULAR:  Negative for chest pain or palpitations  RESPIRATORY:  Negative for cough, wheezing, or SOB   GENITOURINARY:  Negative frequency, urgency or dysuria  NEUROLOGIC:  No headache, confusion, dizziness, lightheadedness    MEDICATIONS  (STANDING):  apixaban 5 milliGRAM(s) Oral every 12 hours  dextrose 5%. 1000 milliLiter(s) (50 mL/Hr) IV Continuous <Continuous>  dextrose 5%. 1000 milliLiter(s) (100 mL/Hr) IV Continuous <Continuous>  dextrose 50% Injectable 25 Gram(s) IV Push once  dextrose 50% Injectable 12.5 Gram(s) IV Push once  dextrose 50% Injectable 25 Gram(s) IV Push once  gentamicin   IVPB 500 milliGRAM(s) IV Intermittent every 24 hours  glucagon  Injectable 1 milliGRAM(s) IntraMuscular once  insulin lispro (ADMELOG) corrective regimen sliding scale   SubCutaneous Before meals and at bedtime    MEDICATIONS  (PRN):  acetaminophen     Tablet .. 650 milliGRAM(s) Oral every 6 hours PRN Temp greater or equal to 38C (100.4F), Mild Pain (1 - 3)  albuterol    90 MICROgram(s) HFA Inhaler 1 Puff(s) Inhalation daily PRN Shortness of Breath and/or Wheezing  aluminum hydroxide/magnesium hydroxide/simethicone Suspension 30 milliLiter(s) Oral every 4 hours PRN Dyspepsia  dextrose Oral Gel 15 Gram(s) Oral once PRN Blood Glucose LESS THAN 70 milliGRAM(s)/deciliter  HYDROmorphone   Tablet 2 milliGRAM(s) Oral every 8 hours PRN Moderate Pain (4 - 6)  melatonin 3 milliGRAM(s) Oral at bedtime PRN Insomnia  ondansetron Injectable 4 milliGRAM(s) IV Push every 8 hours PRN Nausea and/or Vomiting  oxyCODONE    IR 10 milliGRAM(s) Oral every 6 hours PRN Severe Pain (7 - 10)      PHYSICAL EXAM  Vital Signs Last 24 Hrs  T(C): 37.1 (30 Apr 2023 13:04), Max: 37.1 (30 Apr 2023 04:54)  T(F): 98.7 (30 Apr 2023 13:04), Max: 98.7 (30 Apr 2023 04:54)  HR: 87 (30 Apr 2023 13:04) (87 - 97)  BP: 150/83 (30 Apr 2023 13:04) (134/80 - 150/83)  BP(mean): --  RR: 18 (30 Apr 2023 13:04) (17 - 18)  SpO2: 98% (30 Apr 2023 13:04) (95% - 98%)    Parameters below as of 30 Apr 2023 13:04  Patient On (Oxygen Delivery Method): room air        Constitutional: wn/wd in NAD.   HEENT: NCAT, MMM, OP clear, EOMI, no proptosis or lid retraction  Neck: no thyromegaly or palpable thyroid nodules   Respiratory: lungs CTAB.  Cardiovascular: regular rhythm, normal S1 and S2, no audible murmurs, no peripheral edema  GI: soft, NT/ND, no masses/HSM appreciated.  Neurology: no tremors, DTR 2+  Skin: no visible rashes/lesions  Psychiatric: AAO x 3, normal affect/mood.    LABS:                        11.8   8.73  )-----------( 361      ( 30 Apr 2023 05:30 )             39.3     04-30    134<L>  |  101  |  14  ----------------------------<  275<H>  4.4   |  24  |  0.64    Ca    7.8<L>      30 Apr 2023 05:30  Phos  2.6     04-30  Mg     1.7     04-30          Thyroid Stimulating Hormone, Serum: 0.851 uIU/mL (04-28 @ 05:24)      HbA1C:   CAPILLARY BLOOD GLUCOSE      POCT Blood Glucose.: 334 mg/dL (29 Apr 2023 22:24)  POCT Blood Glucose.: 347 mg/dL (29 Apr 2023 17:03)      Insulin Sliding Scale requirements X 24 Hours:    RADIOLOGY & ADDITIONAL TESTS:    Impression/Recommendations: Ms. Katz is a 43 year-old woman with a history of type 2 diabetes mellitus, hypertension, hyperlipidemia, and abdominal necrotizing fascitis admitted with sepsis.     1.  DM, uncontrolled, uncomplicated.  Please start Lantus 20 units at night   Please start Lispro 2 units with meals; will increase as appetite/intake increases  Pleas continue Lispro moderate dose sliding scale 4 times daily with meals and at bedtime  Please continue consistent carbohydrate diet    Goal FSG is 100-180 mg/dL.   Will continue to monitor.

## 2023-04-30 NOTE — PROGRESS NOTE ADULT - PROBLEM SELECTOR PLAN 1
i/s/o of Urinary tract infection, SIRS 2/4 for tachycardia and WBC +lactic acidosis & suspected source of infection. Most recent Culture data reveals negative BCx with positive UCx on 4/4/23 which showed 2 various strains of K. pneumoniae (one ESBL and 1 carbapenem resistant), At that time the patient was seen in the ED (culture obtained via suprapubic catheter), and subsequently discharged home. When culture speciated was contacted and asked ot report to ED for IV ABx however patient never did. Unclear if this culture data represents chronic colonization v acute sepsis 2/2 UTI.   - ID consulted; appreciate recs  - c/w gentamicin 500mg IVPB q24h (ideal body weight 61kg v adjusted body weight 77kg) - last dose 2200 4/27  - Urine culture likely contaminated due to being collected from drainage bag, according to ID  - f/u blood culture   - IR consulted to switch out suprapubic catheter, repeat UCX

## 2023-04-30 NOTE — PROGRESS NOTE ADULT - PROBLEM SELECTOR PLAN 4
Hx of abdominal nec fasc with bowel resection and ostomy. Also has suprapubic catheter exchanged 3/24 at [facility]. Initially required placement due to abdominal necrotizing fascitis. Which also required skin graft of R medial thigh, R gluteal cleft, R labia.   - Ostomy care and wound care education

## 2023-04-30 NOTE — PROGRESS NOTE ADULT - PROBLEM SELECTOR PLAN 2
A1c of 12.5% on previous admission. Previously followed by endo while admitted. O/p endocrinologist is Dr. Joseph [phone]. Per most updated notes, patient to be maintained on humilin U500 160U TID (could use admelog 35units if PP FSG >200). Euglycemic on admission glu 130. Hypoglycemic this AM with FSG 70-90.   - Hold home Humulin  - c/w mISS   - FS QID with meals and at bedtime + 2h post prandial  - Endo consulted; appreciate recs

## 2023-04-30 NOTE — PROGRESS NOTE ADULT - SUBJECTIVE AND OBJECTIVE BOX
CC: Patient is a 43y old  Female who presents with a chief complaint of severe sepsis     INTERVAL EVENTS: FAUSTINO    SUBJECTIVE / INTERVAL HPI: Patient seen and examined at bedside. Patient reports some abdominal pain, especially at site of catheter. Denies SOB, CP.   ROS: negative unless otherwise stated above.      Vital Signs Last 24 Hrs  T(C): 37.1 (2023 13:04), Max: 37.1 (2023 04:54)  T(F): 98.7 (2023 13:04), Max: 98.7 (2023 04:54)  HR: 87 (2023 13:04) (87 - 97)  BP: 150/83 (2023 13:04) (134/80 - 150/83)  BP(mean): --  RR: 18 (2023 13:04) (17 - 18)  SpO2: 98% (2023 13:04) (95% - 98%)    Parameters below as of 2023 13:04  Patient On (Oxygen Delivery Method): room air            PHYSICAL EXAM:  General: Alert and oriented x 3. Slightly obese habitus. Tearful and in pain this AM.   Eyes: EOMI. Anicteric.  HEENT: Moist mucous membranes. No scleral icterus. No cervical lymphadenopathy.  Lungs: Clear to auscultation bilaterally. No accessory muscle use.  Cardiovascular: Regular rate and rhythm. No murmur. No JVD.  Abdomen: Soft, non-tender and non-distended. Suprapubic catheter noted, retracted ~3-4 cm from original location. Draining urine. No discharge, bleeding. Ostomy bag intact with brown stool noted.   Extremities: No edema. Non-tender.  Skin: No rashes or lesions. Warm.  Neurologic: No focal neurological deficits. CN II-XII grossly intact, but not individually tested.  Psychiatric: Cooperative. Appropriate mood and affect.      MEDICATIONS  (STANDING):  apixaban 5 milliGRAM(s) Oral every 12 hours  dextrose 5%. 1000 milliLiter(s) (50 mL/Hr) IV Continuous <Continuous>  dextrose 5%. 1000 milliLiter(s) (100 mL/Hr) IV Continuous <Continuous>  dextrose 50% Injectable 25 Gram(s) IV Push once  dextrose 50% Injectable 12.5 Gram(s) IV Push once  dextrose 50% Injectable 25 Gram(s) IV Push once  gentamicin   IVPB 500 milliGRAM(s) IV Intermittent every 24 hours  glucagon  Injectable 1 milliGRAM(s) IntraMuscular once  insulin lispro (ADMELOG) corrective regimen sliding scale   SubCutaneous Before meals and at bedtime    MEDICATIONS  (PRN):  acetaminophen     Tablet .. 650 milliGRAM(s) Oral every 6 hours PRN Temp greater or equal to 38C (100.4F), Mild Pain (1 - 3)  albuterol    90 MICROgram(s) HFA Inhaler 1 Puff(s) Inhalation daily PRN Shortness of Breath and/or Wheezing  aluminum hydroxide/magnesium hydroxide/simethicone Suspension 30 milliLiter(s) Oral every 4 hours PRN Dyspepsia  dextrose Oral Gel 15 Gram(s) Oral once PRN Blood Glucose LESS THAN 70 milliGRAM(s)/deciliter  HYDROmorphone   Tablet 2 milliGRAM(s) Oral every 8 hours PRN Moderate Pain (4 - 6)  melatonin 3 milliGRAM(s) Oral at bedtime PRN Insomnia  ondansetron Injectable 4 milliGRAM(s) IV Push every 8 hours PRN Nausea and/or Vomiting  oxyCODONE    IR 10 milliGRAM(s) Oral every 6 hours PRN Severe Pain (7 - 10)        ALLERGIES:  Allergies    clindamycin (Pruritus)  fish (Hives; Urticaria)  amoxicillin (Unknown)  iodine containing compounds (Hives; Anaphylaxis)  shellfish. (Hives; Anaphylaxis)  penicillin (Hives)    Intolerances    Bactrim I.V. (Rash (Mod to Severe))      LABS:                          11.8   8.73  )-----------( 361      ( 2023 05:30 )             39.3   04-30    134<L>  |  101  |  14  ----------------------------<  275<H>  4.4   |  24  |  0.64    Ca    7.8<L>      2023 05:30  Phos  2.6     04-30  Mg     1.7     04-30           PTT - ( 2023 21:10 )  PTT:31.0 sec  Urinalysis Basic - ( 2023 22:09 )    Color: Yellow / Appearance: SL Cloudy / S.025 / pH: x  Gluc: x / Ketone: NEGATIVE  / Bili: Negative / Urobili: 0.2 E.U./dL   Blood: x / Protein: 30 mg/dL / Nitrite: POSITIVE   Leuk Esterase: Small / RBC: Many /HPF / WBC Many /HPF   Sq Epi: x / Non Sq Epi: x / Bacteria: Many /HPF      CAPILLARY BLOOD GLUCOSE      POCT Blood Glucose.: 214 mg/dL (2023 08:41)      RADIOLOGY & ADDITIONAL TESTS: Reviewed.

## 2023-04-30 NOTE — PROGRESS NOTE ADULT - ASSESSMENT
43F PMH DM, HTN, HLD, Abdominal Necrotizing fascitis (s/p suprapubic catheter and ostomy bag placement on 11/11/21 at [facility]), p/w several complaints. Pt states that she has several weeks of increasing pain to suprapubic catheter site as well as lower abdomen. Admitted for severe sepsis.

## 2023-05-01 LAB
ANION GAP SERPL CALC-SCNC: 10 MMOL/L — SIGNIFICANT CHANGE UP (ref 5–17)
APTT BLD: 34.5 SEC — SIGNIFICANT CHANGE UP (ref 27.5–35.5)
BASOPHILS # BLD AUTO: 0.03 K/UL — SIGNIFICANT CHANGE UP (ref 0–0.2)
BASOPHILS NFR BLD AUTO: 0.4 % — SIGNIFICANT CHANGE UP (ref 0–2)
BUN SERPL-MCNC: 14 MG/DL — SIGNIFICANT CHANGE UP (ref 7–23)
CALCIUM SERPL-MCNC: 8.3 MG/DL — LOW (ref 8.4–10.5)
CHLORIDE SERPL-SCNC: 98 MMOL/L — SIGNIFICANT CHANGE UP (ref 96–108)
CO2 SERPL-SCNC: 25 MMOL/L — SIGNIFICANT CHANGE UP (ref 22–31)
CREAT SERPL-MCNC: 0.72 MG/DL — SIGNIFICANT CHANGE UP (ref 0.5–1.3)
EGFR: 106 ML/MIN/1.73M2 — SIGNIFICANT CHANGE UP
EOSINOPHIL # BLD AUTO: 0.11 K/UL — SIGNIFICANT CHANGE UP (ref 0–0.5)
EOSINOPHIL NFR BLD AUTO: 1.3 % — SIGNIFICANT CHANGE UP (ref 0–6)
GLUCOSE BLDC GLUCOMTR-MCNC: 219 MG/DL — HIGH (ref 70–99)
GLUCOSE BLDC GLUCOMTR-MCNC: 250 MG/DL — HIGH (ref 70–99)
GLUCOSE BLDC GLUCOMTR-MCNC: 389 MG/DL — HIGH (ref 70–99)
GLUCOSE BLDC GLUCOMTR-MCNC: 397 MG/DL — HIGH (ref 70–99)
GLUCOSE SERPL-MCNC: 302 MG/DL — HIGH (ref 70–99)
HCT VFR BLD CALC: 39.1 % — SIGNIFICANT CHANGE UP (ref 34.5–45)
HGB BLD-MCNC: 11.5 G/DL — SIGNIFICANT CHANGE UP (ref 11.5–15.5)
IMM GRANULOCYTES NFR BLD AUTO: 0.5 % — SIGNIFICANT CHANGE UP (ref 0–0.9)
INR BLD: 1.02 — SIGNIFICANT CHANGE UP (ref 0.88–1.16)
LYMPHOCYTES # BLD AUTO: 2.52 K/UL — SIGNIFICANT CHANGE UP (ref 1–3.3)
LYMPHOCYTES # BLD AUTO: 30.3 % — SIGNIFICANT CHANGE UP (ref 13–44)
MAGNESIUM SERPL-MCNC: 1.9 MG/DL — SIGNIFICANT CHANGE UP (ref 1.6–2.6)
MCHC RBC-ENTMCNC: 23.9 PG — LOW (ref 27–34)
MCHC RBC-ENTMCNC: 29.4 GM/DL — LOW (ref 32–36)
MCV RBC AUTO: 81.3 FL — SIGNIFICANT CHANGE UP (ref 80–100)
MONOCYTES # BLD AUTO: 0.61 K/UL — SIGNIFICANT CHANGE UP (ref 0–0.9)
MONOCYTES NFR BLD AUTO: 7.3 % — SIGNIFICANT CHANGE UP (ref 2–14)
NEUTROPHILS # BLD AUTO: 5 K/UL — SIGNIFICANT CHANGE UP (ref 1.8–7.4)
NEUTROPHILS NFR BLD AUTO: 60.2 % — SIGNIFICANT CHANGE UP (ref 43–77)
NRBC # BLD: 0 /100 WBCS — SIGNIFICANT CHANGE UP (ref 0–0)
PLATELET # BLD AUTO: 429 K/UL — HIGH (ref 150–400)
POTASSIUM SERPL-MCNC: 4 MMOL/L — SIGNIFICANT CHANGE UP (ref 3.5–5.3)
POTASSIUM SERPL-SCNC: 4 MMOL/L — SIGNIFICANT CHANGE UP (ref 3.5–5.3)
PROTHROM AB SERPL-ACNC: 12.1 SEC — SIGNIFICANT CHANGE UP (ref 10.5–13.4)
RBC # BLD: 4.81 M/UL — SIGNIFICANT CHANGE UP (ref 3.8–5.2)
RBC # FLD: 15.5 % — HIGH (ref 10.3–14.5)
SODIUM SERPL-SCNC: 133 MMOL/L — LOW (ref 135–145)
WBC # BLD: 8.31 K/UL — SIGNIFICANT CHANGE UP (ref 3.8–10.5)
WBC # FLD AUTO: 8.31 K/UL — SIGNIFICANT CHANGE UP (ref 3.8–10.5)

## 2023-05-01 PROCEDURE — 99239 HOSP IP/OBS DSCHRG MGMT >30: CPT | Mod: GC

## 2023-05-01 PROCEDURE — 99232 SBSQ HOSP IP/OBS MODERATE 35: CPT

## 2023-05-01 RX ORDER — INSULIN LISPRO 100/ML
2 VIAL (ML) SUBCUTANEOUS
Refills: 0 | Status: DISCONTINUED | OUTPATIENT
Start: 2023-05-01 | End: 2023-05-02

## 2023-05-01 RX ORDER — HYDROMORPHONE HYDROCHLORIDE 2 MG/ML
1 INJECTION INTRAMUSCULAR; INTRAVENOUS; SUBCUTANEOUS ONCE
Refills: 0 | Status: DISCONTINUED | OUTPATIENT
Start: 2023-05-01 | End: 2023-05-01

## 2023-05-01 RX ORDER — GENTAMICIN SULFATE 40 MG/ML
500 VIAL (ML) INJECTION ONCE
Refills: 0 | Status: COMPLETED | OUTPATIENT
Start: 2023-05-01 | End: 2023-05-01

## 2023-05-01 RX ORDER — APIXABAN 2.5 MG/1
1 TABLET, FILM COATED ORAL
Qty: 0 | Refills: 0 | DISCHARGE
Start: 2023-05-01

## 2023-05-01 RX ORDER — INSULIN LISPRO 100/ML
35 VIAL (ML) SUBCUTANEOUS
Qty: 0 | Refills: 0 | DISCHARGE

## 2023-05-01 RX ORDER — HUMAN INSULIN 100 [IU]/ML
40 INJECTION, SUSPENSION SUBCUTANEOUS
Qty: 0 | Refills: 0 | DISCHARGE

## 2023-05-01 RX ORDER — INSULIN GLARGINE 100 [IU]/ML
35 INJECTION, SOLUTION SUBCUTANEOUS AT BEDTIME
Refills: 0 | Status: DISCONTINUED | OUTPATIENT
Start: 2023-05-01 | End: 2023-05-03

## 2023-05-01 RX ORDER — LABETALOL HCL 100 MG
100 TABLET ORAL EVERY 24 HOURS
Refills: 0 | Status: DISCONTINUED | OUTPATIENT
Start: 2023-05-01 | End: 2023-05-03

## 2023-05-01 RX ORDER — ACETAMINOPHEN 500 MG
1000 TABLET ORAL ONCE
Refills: 0 | Status: COMPLETED | OUTPATIENT
Start: 2023-05-01 | End: 2023-05-01

## 2023-05-01 RX ADMIN — Medication 4: at 13:14

## 2023-05-01 RX ADMIN — HYDROMORPHONE HYDROCHLORIDE 1 MILLIGRAM(S): 2 INJECTION INTRAMUSCULAR; INTRAVENOUS; SUBCUTANEOUS at 12:55

## 2023-05-01 RX ADMIN — Medication 2 UNIT(S): at 13:15

## 2023-05-01 RX ADMIN — Medication 10: at 22:22

## 2023-05-01 RX ADMIN — Medication 2 UNIT(S): at 17:50

## 2023-05-01 RX ADMIN — Medication 200 MILLIGRAM(S): at 18:37

## 2023-05-01 RX ADMIN — Medication 10: at 17:50

## 2023-05-01 RX ADMIN — HYDROMORPHONE HYDROCHLORIDE 1 MILLIGRAM(S): 2 INJECTION INTRAMUSCULAR; INTRAVENOUS; SUBCUTANEOUS at 22:21

## 2023-05-01 RX ADMIN — Medication 100 MILLIGRAM(S): at 10:58

## 2023-05-01 RX ADMIN — Medication 4: at 09:27

## 2023-05-01 RX ADMIN — APIXABAN 5 MILLIGRAM(S): 2.5 TABLET, FILM COATED ORAL at 06:07

## 2023-05-01 RX ADMIN — Medication 2 UNIT(S): at 22:26

## 2023-05-01 RX ADMIN — INSULIN GLARGINE 35 UNIT(S): 100 INJECTION, SOLUTION SUBCUTANEOUS at 22:22

## 2023-05-01 RX ADMIN — OXYCODONE HYDROCHLORIDE 10 MILLIGRAM(S): 5 TABLET ORAL at 14:41

## 2023-05-01 RX ADMIN — HYDROMORPHONE HYDROCHLORIDE 2 MILLIGRAM(S): 2 INJECTION INTRAMUSCULAR; INTRAVENOUS; SUBCUTANEOUS at 06:08

## 2023-05-01 RX ADMIN — HYDROMORPHONE HYDROCHLORIDE 2 MILLIGRAM(S): 2 INJECTION INTRAMUSCULAR; INTRAVENOUS; SUBCUTANEOUS at 19:34

## 2023-05-01 RX ADMIN — HYDROMORPHONE HYDROCHLORIDE 2 MILLIGRAM(S): 2 INJECTION INTRAMUSCULAR; INTRAVENOUS; SUBCUTANEOUS at 18:34

## 2023-05-01 RX ADMIN — APIXABAN 5 MILLIGRAM(S): 2.5 TABLET, FILM COATED ORAL at 18:34

## 2023-05-01 RX ADMIN — HYDROMORPHONE HYDROCHLORIDE 1 MILLIGRAM(S): 2 INJECTION INTRAMUSCULAR; INTRAVENOUS; SUBCUTANEOUS at 22:35

## 2023-05-01 RX ADMIN — HYDROMORPHONE HYDROCHLORIDE 1 MILLIGRAM(S): 2 INJECTION INTRAMUSCULAR; INTRAVENOUS; SUBCUTANEOUS at 13:05

## 2023-05-01 RX ADMIN — OXYCODONE HYDROCHLORIDE 10 MILLIGRAM(S): 5 TABLET ORAL at 13:41

## 2023-05-01 NOTE — PROGRESS NOTE ADULT - PROBLEM SELECTOR PLAN 1
i/s/o of Urinary tract infection, SIRS 2/4 for tachycardia and WBC +lactic acidosis & suspected source of infection. Most recent Culture data reveals negative BCx with positive UCx on 4/4/23 which showed 2 various strains of K. pneumoniae (one ESBL and 1 carbapenem resistant), At that time the patient was seen in the ED (culture obtained via suprapubic catheter), and subsequently discharged home. When culture speciated was contacted and asked ot report to ED for IV ABx however patient never did. Unclear if this culture data represents chronic colonization v acute sepsis 2/2 UTI.   - ID consulted; appreciate recs  - c/w gentamicin 500mg IVPB q24h (ideal body weight 61kg v adjusted body weight 77kg) - last dose 2200 4/27  - Urine culture likely contaminated due to being collected from drainage bag, according to ID  - f/u blood culture   - IR consulted to switch out suprapubic catheter, repeat UCX i/s/o of Urinary tract infection, SIRS 2/4 for tachycardia and WBC +lactic acidosis & suspected source of infection. Most recent Culture data reveals negative BCx with positive UCx on 4/4/23 which showed 2 various strains of K. pneumoniae (one ESBL and 1 carbapenem resistant), At that time the patient was seen in the ED (culture obtained via suprapubic catheter), and subsequently discharged home. When culture speciated was contacted and asked ot report to ED for IV ABx however patient never did. Unclear if this culture data represents chronic colonization v acute sepsis 2/2 UTI.   - ID consulted; appreciate recs  - c/w gentamicin 500mg IVPB q24h x5 day total course  - Urine culture likely contaminated due to being collected from drainage bag, according to ID  - f/u blood culture--NGTD x3 days  - IR consulted to switch out suprapubic catheter, repeat UCX

## 2023-05-01 NOTE — PROGRESS NOTE ADULT - ASSESSMENT
43F PMH DM, HTN, HLD, Abdominal Necrotizing fascitis (s/p suprapubic catheter and ostomy bag placement on 11/11/21 at [facility]), p/w several complaints. Pt states that she has several weeks of increasing pain to suprapubic catheter site as well as lower abdomen. Admitted for severe sepsis.     Currently patient is requiring minimal insulin. Expect glucose to increase as clinical status improves. Will then start on lantus and lispro, dose tbd.    A1C: 12.2 %  BUN: 8  Creatinine: 0.60  GFR: 114  Weight: 100.2  BMI: 34.6

## 2023-05-01 NOTE — PROGRESS NOTE ADULT - SUBJECTIVE AND OBJECTIVE BOX
SUBJECTIVE / INTERVAL HPI: Patient was seen and examined this morning.     CAPILLARY BLOOD GLUCOSE & INSULIN RECEIVED  275 mg/dL (04-30 @ 05:30)  248 mg/dL (04-30 @ 09:00)  218 mg/dL (04-30 @ 13:00)  341 mg/dL (04-30 @ 17:51)  338 mg/dL (04-30 @ 22:11)  302 mg/dL (05-01 @ 05:30)  250 mg/dL (05-01 @ 09:07)  219 mg/dL (05-01 @ 12:54)      REVIEW OF SYSTEMS  Constitutional:  Negative fever, chills or loss of appetite.  Eyes:  Negative blurry vision or double vision.  Cardiovascular:  Negative for chest pain or palpitations.  Respiratory:  Negative for cough, wheezing, or shortness of breath.    Gastrointestinal:  Negative for nausea, vomiting, diarrhea, constipation, or abdominal pain.  Genitourinary:  Negative frequency, urgency or dysuria.  Neurologic:  No headache, confusion, dizziness, lightheadedness.    PHYSICAL EXAM  Vital Signs Last 24 Hrs  T(C): 36.4 (01 May 2023 04:53), Max: 37.1 (30 Apr 2023 13:04)  T(F): 97.6 (01 May 2023 04:53), Max: 98.7 (30 Apr 2023 13:04)  HR: 96 (01 May 2023 04:53) (87 - 110)  BP: 158/78 (01 May 2023 04:53) (150/83 - 175/77)  BP(mean): --  RR: 18 (01 May 2023 04:53) (18 - 18)  SpO2: 97% (01 May 2023 04:53) (97% - 99%)    Parameters below as of 01 May 2023 04:53  Patient On (Oxygen Delivery Method): room air        Constitutional: Awake, alert, in no acute distress.   HEENT: Normocephalic, atraumatic, NAOMI.  Respiratory: Lungs clear to ausculation bilaterally.   Cardiovascular: regular rhythm, normal S1 and S2, no audible murmurs.   GI: soft, non-tender, non-distended, bowel sounds present.  Extremities: No lower extremity edema.  Psychiatric: AAO x 3. Normal affect/mood.     LABS  CBC - WBC/HGB/HTC/PLT: 8.31/11.5/39.1/429 (05-01-23)  BMP - Na/K/Cl/Bicarb/BUN/Cr/Gluc/AG/eGFR: 133/4.0/98/25/14/0.72/302/10/106 (05-01-23)  Ca - 8.3 (05-01-23)  Phos - -- (05-01-23)  Mg - 1.9 (05-01-23)  LFT - Alb/Tprot/Tbili/Dbili/AlkPhos/ALT/AST: 2.8/--/<0.2/--/124/9/12 (04-28-23)  PT/aPTT/INR: 12.1/34.5/1.02 (05-01-23)   Thyroid Stimulating Hormone, Serum: 0.851 (04-28-23)      MEDICATIONS  MEDICATIONS  (STANDING):  apixaban 5 milliGRAM(s) Oral every 12 hours  dextrose 5%. 1000 milliLiter(s) (50 mL/Hr) IV Continuous <Continuous>  dextrose 5%. 1000 milliLiter(s) (100 mL/Hr) IV Continuous <Continuous>  dextrose 50% Injectable 25 Gram(s) IV Push once  dextrose 50% Injectable 12.5 Gram(s) IV Push once  dextrose 50% Injectable 25 Gram(s) IV Push once  gentamicin   IVPB 500 milliGRAM(s) IV Intermittent once  glucagon  Injectable 1 milliGRAM(s) IntraMuscular once  HYDROmorphone  Injectable 1 milliGRAM(s) IV Push Once  insulin glargine Injectable (LANTUS) 20 Unit(s) SubCutaneous at bedtime  insulin lispro (ADMELOG) corrective regimen sliding scale   SubCutaneous Before meals and at bedtime  insulin lispro Injectable (ADMELOG) 2 Unit(s) SubCutaneous Before meals and at bedtime  labetalol 100 milliGRAM(s) Oral every 24 hours  polyethylene glycol 3350 17 Gram(s) Oral daily  senna 2 Tablet(s) Oral at bedtime    MEDICATIONS  (PRN):  acetaminophen     Tablet .. 650 milliGRAM(s) Oral every 6 hours PRN Temp greater or equal to 38C (100.4F), Mild Pain (1 - 3)  albuterol    90 MICROgram(s) HFA Inhaler 1 Puff(s) Inhalation daily PRN Shortness of Breath and/or Wheezing  aluminum hydroxide/magnesium hydroxide/simethicone Suspension 30 milliLiter(s) Oral every 4 hours PRN Dyspepsia  dextrose Oral Gel 15 Gram(s) Oral once PRN Blood Glucose LESS THAN 70 milliGRAM(s)/deciliter  HYDROmorphone   Tablet 2 milliGRAM(s) Oral every 8 hours PRN Moderate Pain (4 - 6)  melatonin 3 milliGRAM(s) Oral at bedtime PRN Insomnia  ondansetron Injectable 4 milliGRAM(s) IV Push every 8 hours PRN Nausea and/or Vomiting  oxyCODONE    IR 10 milliGRAM(s) Oral every 6 hours PRN Severe Pain (7 - 10)    ASSESSMENT / RECOMMENDATIONS    A1C: 12.2 %  BUN: 14  Creatinine: 0.72  GFR: 106  Weight: 100.2  BMI: 34.6  EF:     # Type 2 diabetes mellitus  - Please continue lantus *** units at bedtime.   - Continue lispro *** units before each meal.  - Continue lispro moderate / low dose sliding scale before meals and at bedtime.  - Patient's fingerstick glucose goal is 100-180 mg/dL.    - For discharge, patient can ***.    - Patient can follow up at discharge with [facility] by calling [phone] to make an appointment.      Case discussed with Dr. Rose. Primary team updated.       Gerald Qiu    Endocrinology Fellow    Service Pager: [phone]    SUBJECTIVE / INTERVAL HPI: Patient was seen and examined this morning. She reports pain 20/10 to head, abdomen and back. Abdomen is TTP. She is NPO today for possible cath exchange in IR, it was partially pulled out by patient over the weekend. No leukocytosis or fever- on gentamycin. BG have increase over the weekend and she was started on Lantus 20 and lispro 2 yesterday. She has moderate appetite, no snack overnight    CAPILLARY BLOOD GLUCOSE & INSULIN RECEIVED  275 mg/dL (04-30 @ 05:30)   248 mg/dL (04-30 @ 09:00) - Lispro 4  218 mg/dL (04-30 @ 13:00) - Lispro 2+8  341 mg/dL (04-30 @ 17:51) - Lispro 2+8. Ate rice and chicken  338 mg/dL (04-30 @ 22:11) - Lantus 20 + lispro 8  302 mg/dL (05-01 @ 05:30) -   250 mg/dL (05-01 @ 09:07) 0 Lispro 0+4. NPO  219 mg/dL (05-01 @ 12:54)      REVIEW OF SYSTEMS  Constitutional:  Negative fever, chills or loss of appetite. + pain  Eyes:  Negative blurry vision or double vision.  Cardiovascular:  Negative for chest pain or palpitations.  Respiratory:  Negative for cough, wheezing, or shortness of breath.    Gastrointestinal:  Negative for nausea, vomiting, diarrhea, constipation, or abdominal pain.  Genitourinary:  Negative frequency, urgency or dysuria.  Neurologic:  No headache, confusion, dizziness, lightheadedness.    PHYSICAL EXAM  Vital Signs Last 24 Hrs  T(C): 36.4 (01 May 2023 04:53), Max: 37.1 (30 Apr 2023 13:04)  T(F): 97.6 (01 May 2023 04:53), Max: 98.7 (30 Apr 2023 13:04)  HR: 96 (01 May 2023 04:53) (87 - 110)  BP: 158/78 (01 May 2023 04:53) (150/83 - 175/77)  BP(mean): --  RR: 18 (01 May 2023 04:53) (18 - 18)  SpO2: 97% (01 May 2023 04:53) (97% - 99%)    Parameters below as of 01 May 2023 04:53  Patient On (Oxygen Delivery Method): room air    Constitutional: Awake, alert, female, in no acute distress. OBese.  HEENT: Normocephalic, atraumatic, NAOMI.  Respiratory: Lungs clear to ausculation bilaterally.   Cardiovascular: regular rhythm, normal S1 and S2, no audible murmurs.   GI: soft, mildly tender, non-distended, (+) Ostomy. (+) Suprapubic catheter.   Extremities: No lower extremity edema.  Psychiatric: AAO x 3. Normal affect/mood. .     LABS  CBC - WBC/HGB/HTC/PLT: 8.31/11.5/39.1/429 (05-01-23)  BMP - Na/K/Cl/Bicarb/BUN/Cr/Gluc/AG/eGFR: 133/4.0/98/25/14/0.72/302/10/106 (05-01-23)  Ca - 8.3 (05-01-23)  Phos - -- (05-01-23)  Mg - 1.9 (05-01-23)  LFT - Alb/Tprot/Tbili/Dbili/AlkPhos/ALT/AST: 2.8/--/<0.2/--/124/9/12 (04-28-23)  PT/aPTT/INR: 12.1/34.5/1.02 (05-01-23)   Thyroid Stimulating Hormone, Serum: 0.851 (04-28-23)      MEDICATIONS  MEDICATIONS  (STANDING):  apixaban 5 milliGRAM(s) Oral every 12 hours  dextrose 5%. 1000 milliLiter(s) (50 mL/Hr) IV Continuous <Continuous>  dextrose 5%. 1000 milliLiter(s) (100 mL/Hr) IV Continuous <Continuous>  dextrose 50% Injectable 25 Gram(s) IV Push once  dextrose 50% Injectable 12.5 Gram(s) IV Push once  dextrose 50% Injectable 25 Gram(s) IV Push once  gentamicin   IVPB 500 milliGRAM(s) IV Intermittent once  glucagon  Injectable 1 milliGRAM(s) IntraMuscular once  HYDROmorphone  Injectable 1 milliGRAM(s) IV Push Once  insulin glargine Injectable (LANTUS) 20 Unit(s) SubCutaneous at bedtime  insulin lispro (ADMELOG) corrective regimen sliding scale   SubCutaneous Before meals and at bedtime  insulin lispro Injectable (ADMELOG) 2 Unit(s) SubCutaneous Before meals and at bedtime  labetalol 100 milliGRAM(s) Oral every 24 hours  polyethylene glycol 3350 17 Gram(s) Oral daily  senna 2 Tablet(s) Oral at bedtime    MEDICATIONS  (PRN):  acetaminophen     Tablet .. 650 milliGRAM(s) Oral every 6 hours PRN Temp greater or equal to 38C (100.4F), Mild Pain (1 - 3)  albuterol    90 MICROgram(s) HFA Inhaler 1 Puff(s) Inhalation daily PRN Shortness of Breath and/or Wheezing  aluminum hydroxide/magnesium hydroxide/simethicone Suspension 30 milliLiter(s) Oral every 4 hours PRN Dyspepsia  dextrose Oral Gel 15 Gram(s) Oral once PRN Blood Glucose LESS THAN 70 milliGRAM(s)/deciliter  HYDROmorphone   Tablet 2 milliGRAM(s) Oral every 8 hours PRN Moderate Pain (4 - 6)  melatonin 3 milliGRAM(s) Oral at bedtime PRN Insomnia  ondansetron Injectable 4 milliGRAM(s) IV Push every 8 hours PRN Nausea and/or Vomiting  oxyCODONE    IR 10 milliGRAM(s) Oral every 6 hours PRN Severe Pain (7 - 10)

## 2023-05-01 NOTE — PROGRESS NOTE ADULT - PROBLEM SELECTOR PLAN 4
Hx of abdominal nec fasc with bowel resection and ostomy. Also has suprapubic catheter exchanged 3/24 at [facility]. Initially required placement due to abdominal necrotizing fascitis. Which also required skin graft of R medial thigh, R gluteal cleft, R labia.   - Ostomy care and wound care education

## 2023-05-01 NOTE — PROGRESS NOTE ADULT - ATTENDING COMMENTS
#CAUTI, MDRO K.pneumo infection    Per IR, no plan for catheter exchange.  Patient continues to c/o suprapubic tenderness.  Finish gentamicin tonight.  She will need TOV with .      Team 1 will follow you.  Case d/w primary team.    Stephanie Brandon MD, MS  Infectious Disease attending  work cell [phone]   For any questions during evening/weekend/holiday, please page ID on call

## 2023-05-01 NOTE — PROGRESS NOTE ADULT - SUBJECTIVE AND OBJECTIVE BOX
***********incomplete*********** INFECTIOUS DISEASES CONSULT FOLLOW-UP NOTE    INTERVAL HPI/OVERNIGHT EVENTS: FAUSTINO. Pt complaining of suprapubic and abdominal tenderness. Planned for suprapubic catheter exchange with IR      PROBLEMS  SEPSIS; UTI  Essential hypertension  Diabetes  Severe sepsis    History of creation of ostomy  Obesity  Need for prophylactic measure  Encounter for care or replacement of suprapubic tube  Bilaleral pulmonary embolism  Type 2 diabetes mellitus with hyperglycemia        ROS:  CONSTITUTIONAL:  Negative fever or chills, feels well, good appetite, {psotove fpr jeadacje  EYES:  Negative  blurry vision or double vision  CARDIOVASCULAR:  Negative for chest pain or palpitations  RESPIRATORY:  Negative for cough, wheezing, or SOB   GASTROINTESTINAL:  Negative for nausea, vomiting, diarrhea, constipation, Positive for abdominal pain  GENITOURINARY:  Negative frequency, urgency or dysuria  NEUROLOGIC:  No headache, confusion, dizziness, lightheadedness    Allergies    clindamycin (Pruritus)  fish (Hives; Urticaria)  amoxicillin (Unknown)  iodine containing compounds (Hives; Anaphylaxis)  shellfish. (Hives; Anaphylaxis)  penicillin (Hives)    Intolerances    Bactrim I.V. (Rash (Mod to Severe))      ANTIBIOTICS/RELEVANT:  antimicrobials  gentamicin   IVPB 500 milliGRAM(s) IV Intermittent once    immunologic:    OTHER:  acetaminophen     Tablet .. 650 milliGRAM(s) Oral every 6 hours PRN  albuterol    90 MICROgram(s) HFA Inhaler 1 Puff(s) Inhalation daily PRN  aluminum hydroxide/magnesium hydroxide/simethicone Suspension 30 milliLiter(s) Oral every 4 hours PRN  apixaban 5 milliGRAM(s) Oral every 12 hours  dextrose 5%. 1000 milliLiter(s) IV Continuous <Continuous>  dextrose 5%. 1000 milliLiter(s) IV Continuous <Continuous>  dextrose 50% Injectable 25 Gram(s) IV Push once  dextrose 50% Injectable 12.5 Gram(s) IV Push once  dextrose 50% Injectable 25 Gram(s) IV Push once  dextrose Oral Gel 15 Gram(s) Oral once PRN  glucagon  Injectable 1 milliGRAM(s) IntraMuscular once  HYDROmorphone   Tablet 2 milliGRAM(s) Oral every 8 hours PRN  insulin glargine Injectable (LANTUS) 20 Unit(s) SubCutaneous at bedtime  insulin lispro (ADMELOG) corrective regimen sliding scale   SubCutaneous Before meals and at bedtime  insulin lispro Injectable (ADMELOG) 2 Unit(s) SubCutaneous Before meals and at bedtime  labetalol 100 milliGRAM(s) Oral every 24 hours  melatonin 3 milliGRAM(s) Oral at bedtime PRN  ondansetron Injectable 4 milliGRAM(s) IV Push every 8 hours PRN  oxyCODONE    IR 10 milliGRAM(s) Oral every 6 hours PRN  polyethylene glycol 3350 17 Gram(s) Oral daily  senna 2 Tablet(s) Oral at bedtime      Objective:  Vital Signs Last 24 Hrs  T(C): 36.9 (01 May 2023 11:40), Max: 36.9 (01 May 2023 11:40)  T(F): 98.5 (01 May 2023 11:40), Max: 98.5 (01 May 2023 11:40)  HR: 91 (01 May 2023 11:40) (91 - 110)  BP: 140/80 (01 May 2023 11:40) (140/80 - 175/77)  BP(mean): --  RR: 19 (01 May 2023 11:40) (18 - 19)  SpO2: 97% (01 May 2023 11:40) (97% - 99%)    Parameters below as of 01 May 2023 11:40  Patient On (Oxygen Delivery Method): room air        PHYSICAL EXAM:  Constitutional: NAD  Eyes: NAOMI, EOMI  Ear/Nose/Throat: no oral lesion, no sinus tenderness on percussion	  Respiratory: CTA b/l  Cardiovascular: S1S2 RRR, no murmurs  Gastrointestinal: soft, (+) BS, no HSM, distended abdomen, TTP with palpation in all quadrants of abdomen  Extremities: no peripheral edema, WWP        LABS:                        11.5   8.31  )-----------( 429      ( 01 May 2023 05:30 )             39.1     05-01    133<L>  |  98  |  14  ----------------------------<  302<H>  4.0   |  25  |  0.72    Ca    8.3<L>      01 May 2023 05:30  Phos  2.6     04-30  Mg     1.9     05-01      PT/INR - ( 01 May 2023 05:30 )   PT: 12.1 sec;   INR: 1.02          PTT - ( 01 May 2023 05:30 )  PTT:34.5 sec        MICROBIOLOGY:  Culture - Blood (04.27.23 @ 21:45)    Specimen Source: .Blood Blood-Peripheral   Culture Results:   No growth at 3 days.          RADIOLOGY & ADDITIONAL STUDIES: REVIEWED

## 2023-05-01 NOTE — PROGRESS NOTE ADULT - SUBJECTIVE AND OBJECTIVE BOX
**Incomplete**    CC: Patient is a 43y old  Female who presents with a chief complaint of severe sepsis (30 Apr 2023 15:05)      INTERVAL EVENTS: FAUSTINO    SUBJECTIVE / INTERVAL HPI: Patient seen and examined at bedside.     ROS: negative unless otherwise stated above.    VITAL SIGNS:  Vital Signs Last 24 Hrs  T(C): 36.4 (01 May 2023 04:53), Max: 37.1 (30 Apr 2023 13:04)  T(F): 97.6 (01 May 2023 04:53), Max: 98.7 (30 Apr 2023 13:04)  HR: 96 (01 May 2023 04:53) (87 - 110)  BP: 158/78 (01 May 2023 04:53) (150/83 - 175/77)  BP(mean): --  RR: 18 (01 May 2023 04:53) (18 - 18)  SpO2: 97% (01 May 2023 04:53) (97% - 99%)    Parameters below as of 01 May 2023 04:53  Patient On (Oxygen Delivery Method): room air      04-30-23 @ 07:01  -  05-01-23 @ 07:00  --------------------------------------------------------  IN: 0 mL / OUT: 1750 mL / NET: -1750 mL      PHYSICAL EXAM:  Constitutional: obese female resting comfortably; NAD  HEENT: NC/AT, anicteric sclera, MMM  Neck: supple; no JVD or thyromegaly  Respiratory: CTA B/L; no W/R/R, no retractions  Cardiac: +S1/S2; RRR; no M/R/G  Gastrointestinal: soft, NT/ND; no rebound or guarding; +BS, Suprapubic catheter noted, retracted ~3-4 cm from original location. Draining urine. No discharge, bleeding. Ostomy bag intact with brown stool noted. Skin grafts well healed  Extremities: WWP, no clubbing or cyanosis; no peripheral edema  Musculoskeletal: NROM x4; no joint swelling, tenderness or erythema  Vascular: 2+ radial, DP/PT pulses B/L  Dermatologic: skin warm, dry and intact; no rashes, wounds, or scars  Neurologic: AAOx3, no focal deficits  Psychiatric: calm, cooperative, behaviors are appropriate, denies SI/HI    MEDICATIONS:  MEDICATIONS  (STANDING):  apixaban 5 milliGRAM(s) Oral every 12 hours  dextrose 5%. 1000 milliLiter(s) (100 mL/Hr) IV Continuous <Continuous>  dextrose 5%. 1000 milliLiter(s) (50 mL/Hr) IV Continuous <Continuous>  dextrose 50% Injectable 25 Gram(s) IV Push once  dextrose 50% Injectable 12.5 Gram(s) IV Push once  dextrose 50% Injectable 25 Gram(s) IV Push once  gentamicin   IVPB 500 milliGRAM(s) IV Intermittent every 24 hours  glucagon  Injectable 1 milliGRAM(s) IntraMuscular once  insulin glargine Injectable (LANTUS) 20 Unit(s) SubCutaneous at bedtime  insulin lispro (ADMELOG) corrective regimen sliding scale   SubCutaneous Before meals and at bedtime  insulin lispro Injectable (ADMELOG) 2 Unit(s) SubCutaneous Before meals and at bedtime  polyethylene glycol 3350 17 Gram(s) Oral daily  senna 2 Tablet(s) Oral at bedtime    MEDICATIONS  (PRN):  acetaminophen     Tablet .. 650 milliGRAM(s) Oral every 6 hours PRN Temp greater or equal to 38C (100.4F), Mild Pain (1 - 3)  albuterol    90 MICROgram(s) HFA Inhaler 1 Puff(s) Inhalation daily PRN Shortness of Breath and/or Wheezing  aluminum hydroxide/magnesium hydroxide/simethicone Suspension 30 milliLiter(s) Oral every 4 hours PRN Dyspepsia  dextrose Oral Gel 15 Gram(s) Oral once PRN Blood Glucose LESS THAN 70 milliGRAM(s)/deciliter  HYDROmorphone   Tablet 2 milliGRAM(s) Oral every 8 hours PRN Moderate Pain (4 - 6)  melatonin 3 milliGRAM(s) Oral at bedtime PRN Insomnia  ondansetron Injectable 4 milliGRAM(s) IV Push every 8 hours PRN Nausea and/or Vomiting  oxyCODONE    IR 10 milliGRAM(s) Oral every 6 hours PRN Severe Pain (7 - 10)      ALLERGIES:  Allergies    clindamycin (Pruritus)  fish (Hives; Urticaria)  amoxicillin (Unknown)  iodine containing compounds (Hives; Anaphylaxis)  shellfish. (Hives; Anaphylaxis)  penicillin (Hives)    Intolerances    Bactrim I.V. (Rash (Mod to Severe))      LABS:                        11.8   8.73  )-----------( 361      ( 30 Apr 2023 05:30 )             39.3     04-30    134<L>  |  101  |  14  ----------------------------<  275<H>  4.4   |  24  |  0.64    Ca    7.8<L>      30 Apr 2023 05:30  Phos  2.6     04-30  Mg     1.7     04-30          CAPILLARY BLOOD GLUCOSE      POCT Blood Glucose.: 338 mg/dL (30 Apr 2023 22:11)      RADIOLOGY & ADDITIONAL TESTS: Reviewed.   CC: Patient is a 43y old  Female who presents with severe sepsis.    INTERVAL EVENTS: FAUSTINO    SUBJECTIVE / INTERVAL HPI: Patient seen and examined at bedside. C/o lower abdominal pain. Able to eat full meals now, maintaining good appetite without nausea/vomiting.    ROS: negative unless otherwise stated above.    VITAL SIGNS:  Vital Signs Last 24 Hrs  T(C): 36.4 (01 May 2023 04:53), Max: 37.1 (30 Apr 2023 13:04)  T(F): 97.6 (01 May 2023 04:53), Max: 98.7 (30 Apr 2023 13:04)  HR: 96 (01 May 2023 04:53) (87 - 110)  BP: 158/78 (01 May 2023 04:53) (150/83 - 175/77)  RR: 18 (01 May 2023 04:53) (18 - 18)  SpO2: 97% (01 May 2023 04:53) (97% - 99%)    Parameters below as of 01 May 2023 04:53  Patient On (Oxygen Delivery Method): room air      04-30-23 @ 07:01  -  05-01-23 @ 07:00  --------------------------------------------------------  IN: 0 mL / OUT: 1750 mL / NET: -1750 mL      PHYSICAL EXAM:  Constitutional: obese female resting comfortably; NAD  HEENT: NC/AT, anicteric sclera, MMM  Neck: supple; no JVD or thyromegaly  Respiratory: CTA B/L; no W/R/R, no retractions  Cardiac: +S1/S2; RRR; no M/R/G  Gastrointestinal: soft, NT/ND; no rebound or guarding; +BS, Suprapubic catheter noted, retracted ~3-4 cm from original location. Draining urine. No discharge, bleeding. Ostomy bag intact with brown stool noted. Skin grafts well healed  Extremities: WWP, no clubbing or cyanosis; no peripheral edema  Musculoskeletal: NROM x4; no joint swelling, tenderness or erythema  Dermatologic: skin warm, dry and intact; no rashes, wounds, or scars  Neurologic: AAOx3, no focal deficits  Psychiatric: anxious, crying    MEDICATIONS:  MEDICATIONS  (STANDING):  apixaban 5 milliGRAM(s) Oral every 12 hours  dextrose 5%. 1000 milliLiter(s) (100 mL/Hr) IV Continuous <Continuous>  dextrose 5%. 1000 milliLiter(s) (50 mL/Hr) IV Continuous <Continuous>  dextrose 50% Injectable 25 Gram(s) IV Push once  dextrose 50% Injectable 12.5 Gram(s) IV Push once  dextrose 50% Injectable 25 Gram(s) IV Push once  gentamicin   IVPB 500 milliGRAM(s) IV Intermittent every 24 hours  glucagon  Injectable 1 milliGRAM(s) IntraMuscular once  insulin glargine Injectable (LANTUS) 20 Unit(s) SubCutaneous at bedtime  insulin lispro (ADMELOG) corrective regimen sliding scale   SubCutaneous Before meals and at bedtime  insulin lispro Injectable (ADMELOG) 2 Unit(s) SubCutaneous Before meals and at bedtime  polyethylene glycol 3350 17 Gram(s) Oral daily  senna 2 Tablet(s) Oral at bedtime    MEDICATIONS  (PRN):  acetaminophen     Tablet .. 650 milliGRAM(s) Oral every 6 hours PRN Temp greater or equal to 38C (100.4F), Mild Pain (1 - 3)  albuterol    90 MICROgram(s) HFA Inhaler 1 Puff(s) Inhalation daily PRN Shortness of Breath and/or Wheezing  aluminum hydroxide/magnesium hydroxide/simethicone Suspension 30 milliLiter(s) Oral every 4 hours PRN Dyspepsia  dextrose Oral Gel 15 Gram(s) Oral once PRN Blood Glucose LESS THAN 70 milliGRAM(s)/deciliter  HYDROmorphone   Tablet 2 milliGRAM(s) Oral every 8 hours PRN Moderate Pain (4 - 6)  melatonin 3 milliGRAM(s) Oral at bedtime PRN Insomnia  ondansetron Injectable 4 milliGRAM(s) IV Push every 8 hours PRN Nausea and/or Vomiting  oxyCODONE    IR 10 milliGRAM(s) Oral every 6 hours PRN Severe Pain (7 - 10)      ALLERGIES:  Allergies    clindamycin (Pruritus)  fish (Hives; Urticaria)  amoxicillin (Unknown)  iodine containing compounds (Hives; Anaphylaxis)  shellfish. (Hives; Anaphylaxis)  penicillin (Hives)    Intolerances    Bactrim I.V. (Rash (Mod to Severe))      LABS:                        11.8   8.73  )-----------( 361      ( 30 Apr 2023 05:30 )             39.3     04-30    134<L>  |  101  |  14  ----------------------------<  275<H>  4.4   |  24  |  0.64    Ca    7.8<L>      30 Apr 2023 05:30  Phos  2.6     04-30  Mg     1.7     04-30      POCT Blood Glucose.: 338 mg/dL (30 Apr 2023 22:11)      RADIOLOGY & ADDITIONAL TESTS: Reviewed.

## 2023-05-01 NOTE — PROGRESS NOTE ADULT - ATTENDING COMMENTS
Patient reports abdominal pain in all 4 quadrants, improved from prior, no urinary symptoms. Denies N/V, has good appetite, ostomy with stools, no blood. Supra-pubic cathter in place, draining urine, no hematuria, no leaks. Discussed personally with IR Attending, re catheter exchange, per conversation, no indication for exchange, since in place and draining urine, despite UA. Communicated with patient. No other complaints or events reported.  General: AOX3, NAD, lying in bed, speaking in full sentences, no labored breathing on RA  HEENT: AT/NC, no facial asymmetry  Lungs: no crackles  Heart: RRR  Abdomen: no erythema, Colostomy, soft, no guarding, no tenderness, catheter draining, no leaking,   Extremities: warm, no edema, no tenderness, no calf tenderness, no focal deficit,    Labs reviewed    Continue on ATB per ID, patient to finish Gentamycin course today. Appreciate Urology, IR. No plan for catheter exchange this admission. Patient with good UOP from catheter, no pus, no leaks, creatinine stable. Patient will follow-up with Urology for TOV as outpatient  Patient on chronic opioids for chronic pain, tolerating. Corrected Na 138  Remains hyperglycemic, appreciate endocrinology  Continue on AC for PE, no signs of bleeding

## 2023-05-01 NOTE — PROGRESS NOTE ADULT - ASSESSMENT
43F h/o abdominal necrotizing fasciitis s/p suprapubic catheter and ostomy bag placement on 11/11/21 at [place], recent admission for UTI 2/2 MDRO p/w worsening suprapubic pain, likely CAUTI.  4/4 UCx grew MDR K.pneumo. Pt planned for suprapubic cath exchange with IR.      - cont gent 500mg IV q24h for 5 days through 5/1.   - recommend holley exchange by IR given holley coming out and in setting of new UTI  - send new urine cx when new holley inserted    Team 1 will follow.

## 2023-05-01 NOTE — PROGRESS NOTE ADULT - PROBLEM SELECTOR PLAN 2
A1c of 12.5% on previous admission. Previously followed by endo while admitted. O/p endocrinologist is Dr. Joseph [phone]. Per most updated notes, patient to be maintained on humilin U500 160U TID (could use admelog 35units if PP FSG >200). Euglycemic on admission glu 130. Hypoglycemic this AM with FSG 70-90.   - Hold home Humulin  - c/w mISS   - FS QID with meals and at bedtime + 2h post prandial  - Endo consulted; appreciate recs A1c of 12.5% on previous admission. Previously followed by endo while admitted. O/p endocrinologist is Dr. Joseph [phone]. Per most updated notes, patient to be maintained on humilin U500 160U TID (could use admelog 35units if PP FSG >200). Euglycemic on admission glu 130. Hypoglycemic this AM with FSG 70-90.   - Hold home Humulin  - Lantus and premeal insulin regimen per endo recs  - c/w mISS   - FS QID with meals and at bedtime + 2h post prandial  - Endo consulted; appreciate recs

## 2023-05-01 NOTE — PROGRESS NOTE ADULT - ASSESSMENT
43F PMH DM, HTN, HLD, Abdominal Necrotizing fascitis (s/p suprapubic catheter and ostomy bag placement on 11/11/21 at [facility]), p/w several complaints. Pt states that she has several weeks of increasing pain to suprapubic catheter site as well as lower abdomen. Admitted for severe sepsis.

## 2023-05-01 NOTE — PROGRESS NOTE ADULT - PROBLEM SELECTOR PLAN 1
Type 2 diabetes mellitus  - Continue lispro moderate dose sliding scale before meals and at bedtime.  - Patient's fingerstick glucose goal is 100-180 mg/dL.    - For discharge, patient can continue H  - Patient can follow up at discharge with [facility] by calling [phone] to make an appointment.      Case discussed with Dr. Rose. Primary team updated. Type 2 diabetes mellitus  - Continue lispro moderate dose sliding scale before meals and at bedtime.  - Patient's fingerstick glucose goal is 100-180 mg/dL.    - For discharge, patient can continue Humalog U500 but at decreased dose of 40 units TID, and she will have to titrate further at home based on her FSG.  - Patient can follow up at discharge with [facility] by calling [phone] to make an appointment.      Case discussed with Dr. Bowden. Primary team updated. Type 2 diabetes mellitus  - Please increase lantus to 35 units at night.  - Plesae increase lispro to 20 units before meals.  - Continue lispro moderate dose sliding scale before meals and at bedtime.  - Patient's fingerstick glucose goal is 100-180 mg/dL.    - For discharge, patient can continue Humalog U500 but at decreased dose of 50 units TID, and she will have to titrate further at home based on her FSG.  - Patient can follow up at discharge with [facility] by calling [phone] to make an appointment.      Case discussed with Dr. Bowden. Primary team updated.

## 2023-05-02 LAB
ANION GAP SERPL CALC-SCNC: 11 MMOL/L — SIGNIFICANT CHANGE UP (ref 5–17)
BASOPHILS # BLD AUTO: 0.03 K/UL — SIGNIFICANT CHANGE UP (ref 0–0.2)
BASOPHILS NFR BLD AUTO: 0.3 % — SIGNIFICANT CHANGE UP (ref 0–2)
BUN SERPL-MCNC: 16 MG/DL — SIGNIFICANT CHANGE UP (ref 7–23)
CALCIUM SERPL-MCNC: 8.7 MG/DL — SIGNIFICANT CHANGE UP (ref 8.4–10.5)
CHLORIDE SERPL-SCNC: 98 MMOL/L — SIGNIFICANT CHANGE UP (ref 96–108)
CO2 SERPL-SCNC: 26 MMOL/L — SIGNIFICANT CHANGE UP (ref 22–31)
CREAT SERPL-MCNC: 0.83 MG/DL — SIGNIFICANT CHANGE UP (ref 0.5–1.3)
CULTURE RESULTS: SIGNIFICANT CHANGE UP
CULTURE RESULTS: SIGNIFICANT CHANGE UP
EGFR: 90 ML/MIN/1.73M2 — SIGNIFICANT CHANGE UP
EOSINOPHIL # BLD AUTO: 0.13 K/UL — SIGNIFICANT CHANGE UP (ref 0–0.5)
EOSINOPHIL NFR BLD AUTO: 1.5 % — SIGNIFICANT CHANGE UP (ref 0–6)
GLUCOSE BLDC GLUCOMTR-MCNC: 194 MG/DL — HIGH (ref 70–99)
GLUCOSE BLDC GLUCOMTR-MCNC: 207 MG/DL — HIGH (ref 70–99)
GLUCOSE BLDC GLUCOMTR-MCNC: 287 MG/DL — HIGH (ref 70–99)
GLUCOSE BLDC GLUCOMTR-MCNC: 347 MG/DL — HIGH (ref 70–99)
GLUCOSE SERPL-MCNC: 428 MG/DL — HIGH (ref 70–99)
HCT VFR BLD CALC: 40.6 % — SIGNIFICANT CHANGE UP (ref 34.5–45)
HGB BLD-MCNC: 11.9 G/DL — SIGNIFICANT CHANGE UP (ref 11.5–15.5)
IMM GRANULOCYTES NFR BLD AUTO: 0.5 % — SIGNIFICANT CHANGE UP (ref 0–0.9)
LYMPHOCYTES # BLD AUTO: 2.48 K/UL — SIGNIFICANT CHANGE UP (ref 1–3.3)
LYMPHOCYTES # BLD AUTO: 28.6 % — SIGNIFICANT CHANGE UP (ref 13–44)
MAGNESIUM SERPL-MCNC: 2.1 MG/DL — SIGNIFICANT CHANGE UP (ref 1.6–2.6)
MCHC RBC-ENTMCNC: 24 PG — LOW (ref 27–34)
MCHC RBC-ENTMCNC: 29.3 GM/DL — LOW (ref 32–36)
MCV RBC AUTO: 81.9 FL — SIGNIFICANT CHANGE UP (ref 80–100)
MONOCYTES # BLD AUTO: 0.76 K/UL — SIGNIFICANT CHANGE UP (ref 0–0.9)
MONOCYTES NFR BLD AUTO: 8.8 % — SIGNIFICANT CHANGE UP (ref 2–14)
NEUTROPHILS # BLD AUTO: 5.24 K/UL — SIGNIFICANT CHANGE UP (ref 1.8–7.4)
NEUTROPHILS NFR BLD AUTO: 60.3 % — SIGNIFICANT CHANGE UP (ref 43–77)
NRBC # BLD: 0 /100 WBCS — SIGNIFICANT CHANGE UP (ref 0–0)
PHOSPHATE SERPL-MCNC: 3.8 MG/DL — SIGNIFICANT CHANGE UP (ref 2.5–4.5)
PLATELET # BLD AUTO: 417 K/UL — HIGH (ref 150–400)
POTASSIUM SERPL-MCNC: 4.6 MMOL/L — SIGNIFICANT CHANGE UP (ref 3.5–5.3)
POTASSIUM SERPL-SCNC: 4.6 MMOL/L — SIGNIFICANT CHANGE UP (ref 3.5–5.3)
RBC # BLD: 4.96 M/UL — SIGNIFICANT CHANGE UP (ref 3.8–5.2)
RBC # FLD: 16.1 % — HIGH (ref 10.3–14.5)
SODIUM SERPL-SCNC: 135 MMOL/L — SIGNIFICANT CHANGE UP (ref 135–145)
SPECIMEN SOURCE: SIGNIFICANT CHANGE UP
SPECIMEN SOURCE: SIGNIFICANT CHANGE UP
WBC # BLD: 8.68 K/UL — SIGNIFICANT CHANGE UP (ref 3.8–10.5)
WBC # FLD AUTO: 8.68 K/UL — SIGNIFICANT CHANGE UP (ref 3.8–10.5)

## 2023-05-02 PROCEDURE — 99233 SBSQ HOSP IP/OBS HIGH 50: CPT | Mod: GC

## 2023-05-02 PROCEDURE — 99232 SBSQ HOSP IP/OBS MODERATE 35: CPT

## 2023-05-02 PROCEDURE — 99232 SBSQ HOSP IP/OBS MODERATE 35: CPT | Mod: GC

## 2023-05-02 PROCEDURE — 74176 CT ABD & PELVIS W/O CONTRAST: CPT | Mod: 26

## 2023-05-02 RX ORDER — INSULIN GLARGINE 100 [IU]/ML
30 INJECTION, SOLUTION SUBCUTANEOUS ONCE
Refills: 0 | Status: COMPLETED | OUTPATIENT
Start: 2023-05-02 | End: 2023-05-02

## 2023-05-02 RX ORDER — HYDROMORPHONE HYDROCHLORIDE 2 MG/ML
1 INJECTION INTRAMUSCULAR; INTRAVENOUS; SUBCUTANEOUS ONCE
Refills: 0 | Status: DISCONTINUED | OUTPATIENT
Start: 2023-05-02 | End: 2023-05-02

## 2023-05-02 RX ORDER — INSULIN LISPRO 100/ML
20 VIAL (ML) SUBCUTANEOUS
Refills: 0 | Status: DISCONTINUED | OUTPATIENT
Start: 2023-05-02 | End: 2023-05-02

## 2023-05-02 RX ORDER — INSULIN LISPRO 100/ML
20 VIAL (ML) SUBCUTANEOUS
Refills: 0 | Status: DISCONTINUED | OUTPATIENT
Start: 2023-05-02 | End: 2023-05-03

## 2023-05-02 RX ADMIN — Medication 20 UNIT(S): at 13:15

## 2023-05-02 RX ADMIN — APIXABAN 5 MILLIGRAM(S): 2.5 TABLET, FILM COATED ORAL at 06:32

## 2023-05-02 RX ADMIN — POLYETHYLENE GLYCOL 3350 17 GRAM(S): 17 POWDER, FOR SOLUTION ORAL at 13:14

## 2023-05-02 RX ADMIN — Medication 2: at 18:35

## 2023-05-02 RX ADMIN — INSULIN GLARGINE 30 UNIT(S): 100 INJECTION, SOLUTION SUBCUTANEOUS at 13:25

## 2023-05-02 RX ADMIN — Medication 6: at 13:14

## 2023-05-02 RX ADMIN — Medication 4: at 22:26

## 2023-05-02 RX ADMIN — Medication 20 UNIT(S): at 18:36

## 2023-05-02 RX ADMIN — Medication 100 MILLIGRAM(S): at 09:06

## 2023-05-02 RX ADMIN — OXYCODONE HYDROCHLORIDE 10 MILLIGRAM(S): 5 TABLET ORAL at 01:00

## 2023-05-02 RX ADMIN — HYDROMORPHONE HYDROCHLORIDE 1 MILLIGRAM(S): 2 INJECTION INTRAMUSCULAR; INTRAVENOUS; SUBCUTANEOUS at 13:33

## 2023-05-02 RX ADMIN — OXYCODONE HYDROCHLORIDE 10 MILLIGRAM(S): 5 TABLET ORAL at 07:30

## 2023-05-02 RX ADMIN — OXYCODONE HYDROCHLORIDE 10 MILLIGRAM(S): 5 TABLET ORAL at 06:32

## 2023-05-02 RX ADMIN — APIXABAN 5 MILLIGRAM(S): 2.5 TABLET, FILM COATED ORAL at 18:53

## 2023-05-02 RX ADMIN — Medication 8: at 09:32

## 2023-05-02 RX ADMIN — OXYCODONE HYDROCHLORIDE 10 MILLIGRAM(S): 5 TABLET ORAL at 00:00

## 2023-05-02 RX ADMIN — OXYCODONE HYDROCHLORIDE 10 MILLIGRAM(S): 5 TABLET ORAL at 23:20

## 2023-05-02 RX ADMIN — HYDROMORPHONE HYDROCHLORIDE 1 MILLIGRAM(S): 2 INJECTION INTRAMUSCULAR; INTRAVENOUS; SUBCUTANEOUS at 14:49

## 2023-05-02 RX ADMIN — INSULIN GLARGINE 35 UNIT(S): 100 INJECTION, SOLUTION SUBCUTANEOUS at 22:27

## 2023-05-02 NOTE — PROGRESS NOTE ADULT - PROBLEM SELECTOR PLAN 1
Type 2 diabetes mellitus  - Please increase lantus to  units at night.  - Please increase lispro to  units before meals.  - Continue lispro moderate dose sliding scale before meals and at bedtime.  - Patient's fingerstick glucose goal is 100-180 mg/dL.    - For discharge, patient can continue Humalog U500 but at decreased dose of 50 units TID, and she will have to titrate further at home based on her FSG.  - Patient can follow up at discharge with [facility] by calling [phone] to make an appointment.      Case discussed with Dr. Bowden. Primary team updated. Type 2 diabetes mellitus  - Please continue lantus 30 units in AM and 35 units at night.  - Please continue lispro 20  units before meals.  - Continue lispro moderate dose sliding scale before meals and at bedtime.  - Patient's fingerstick glucose goal is 100-180 mg/dL.    - For discharge, patient can continue Humalog U500 but at decreased dose of 50 units TID, and she will have to titrate further at home based on her FSG.  - Patient can follow up at discharge with [facility] by calling [phone] to make an appointment.      Case discussed with Dr. Bowden. Primary team updated.

## 2023-05-02 NOTE — PROGRESS NOTE ADULT - ASSESSMENT
43F PMH DM, HTN, HLD, Abdominal Necrotizing fascitis (s/p suprapubic catheter and ostomy bag placement on 11/11/21 at [facility]), p/w several complaints. Pt states that she has several weeks of increasing pain to suprapubic catheter site as well as lower abdomen. Admitted for severe sepsis.   Blood Glucose is increasing. Insulin to be adjusted.    A1C: 12.2 %  BUN: 16  Creatinine: 0.83  GFR: 90  Weight: 100.2  BMI: 34.6

## 2023-05-02 NOTE — PROGRESS NOTE ADULT - PROBLEM SELECTOR PLAN 2
A1c of 12.5% on previous admission. Previously followed by endo while admitted. O/p endocrinologist is Dr. Joseph [phone]. Per most updated notes, patient to be maintained on humilin U500 160U TID (could use admelog 35units if PP FSG >200). Euglycemic on admission glu 130. Hypoglycemic this AM with FSG 70-90.   - Hold home Humulin  - Lantus and premeal insulin regimen per endo recs  - c/w mISS   - FS QID with meals and at bedtime + 2h post prandial  - Endo consulted; appreciate recs

## 2023-05-02 NOTE — PROGRESS NOTE ADULT - SUBJECTIVE AND OBJECTIVE BOX
CC: Patient is a 43y old  Female who presents with a chief complaint of severe sepsis (02 May 2023 11:45)    INTERVAL EVENTS: FAUSTINO    SUBJECTIVE / INTERVAL HPI: Patient seen and examined at bedside. Still c/o lower abdominal pain and persistent headaches only relieved with IV Dilaudid Able to eat full meals now, maintaining good appetite without nausea/vomiting.    ROS: negative unless otherwise stated above.    VITAL SIGNS:  Vital Signs Last 24 Hrs  T(C): 37.1 (02 May 2023 05:53), Max: 37.3 (01 May 2023 20:55)  T(F): 98.7 (02 May 2023 05:53), Max: 99.1 (01 May 2023 20:55)  HR: 96 (02 May 2023 05:53) (96 - 109)  BP: 115/80 (02 May 2023 05:53) (107/74 - 115/80)  RR: 18 (02 May 2023 05:53) (16 - 18)  SpO2: 98% (02 May 2023 05:53) (97% - 98%)    Parameters below as of 02 May 2023 05:53  Patient On (Oxygen Delivery Method): room air      05-01-23 @ 07:01  -  05-02-23 @ 07:00  --------------------------------------------------------  IN: 0 mL / OUT: 2100 mL / NET: -2100 mL      PHYSICAL EXAM:  Constitutional: obese female resting comfortably; NAD  HEENT: NC/AT, anicteric sclera, MMM  Neck: supple; no JVD or thyromegaly  Respiratory: CTA B/L; no W/R/R, no retractions  Cardiac: +S1/S2; RRR; no M/R/G  Gastrointestinal: soft, NT/ND; no rebound or guarding; +BS, Suprapubic catheter noted, retracted ~3-4 cm from original location, with surround erythema & irregular borders. Draining clear yellow urine. Ostomy bag intact with brown stool noted. Skin grafts well healed.  Extremities: WWP, no clubbing or cyanosis; no peripheral edema  Musculoskeletal: NROM x4; no joint swelling, tenderness or erythema  Dermatologic: skin warm, dry and intact; no rashes, wounds, or scars  Neurologic: AAOx3, no focal deficits  Psychiatric: anxious, crying    MEDICATIONS:  MEDICATIONS  (STANDING):  apixaban 5 milliGRAM(s) Oral every 12 hours  dextrose 5%. 1000 milliLiter(s) (100 mL/Hr) IV Continuous <Continuous>  dextrose 5%. 1000 milliLiter(s) (50 mL/Hr) IV Continuous <Continuous>  dextrose 50% Injectable 25 Gram(s) IV Push once  dextrose 50% Injectable 12.5 Gram(s) IV Push once  dextrose 50% Injectable 25 Gram(s) IV Push once  glucagon  Injectable 1 milliGRAM(s) IntraMuscular once  insulin glargine Injectable (LANTUS) 30 Unit(s) SubCutaneous once  insulin glargine Injectable (LANTUS) 35 Unit(s) SubCutaneous at bedtime  insulin lispro (ADMELOG) corrective regimen sliding scale   SubCutaneous Before meals and at bedtime  insulin lispro Injectable (ADMELOG) 20 Unit(s) SubCutaneous three times a day before meals  labetalol 100 milliGRAM(s) Oral every 24 hours  polyethylene glycol 3350 17 Gram(s) Oral daily  senna 2 Tablet(s) Oral at bedtime    MEDICATIONS  (PRN):  acetaminophen     Tablet .. 650 milliGRAM(s) Oral every 6 hours PRN Temp greater or equal to 38C (100.4F), Mild Pain (1 - 3)  albuterol    90 MICROgram(s) HFA Inhaler 1 Puff(s) Inhalation daily PRN Shortness of Breath and/or Wheezing  aluminum hydroxide/magnesium hydroxide/simethicone Suspension 30 milliLiter(s) Oral every 4 hours PRN Dyspepsia  dextrose Oral Gel 15 Gram(s) Oral once PRN Blood Glucose LESS THAN 70 milliGRAM(s)/deciliter  HYDROmorphone   Tablet 2 milliGRAM(s) Oral every 8 hours PRN Moderate Pain (4 - 6)  melatonin 3 milliGRAM(s) Oral at bedtime PRN Insomnia  ondansetron Injectable 4 milliGRAM(s) IV Push every 8 hours PRN Nausea and/or Vomiting  oxyCODONE    IR 10 milliGRAM(s) Oral every 6 hours PRN Severe Pain (7 - 10)      ALLERGIES:  Allergies    clindamycin (Pruritus)  fish (Hives; Urticaria)  amoxicillin (Unknown)  iodine containing compounds (Hives; Anaphylaxis)  shellfish. (Hives; Anaphylaxis)  penicillin (Hives)    Intolerances    Bactrim I.V. (Rash (Mod to Severe))      LABS:                        11.9   8.68  )-----------( 417      ( 02 May 2023 05:30 )             40.6     05-02    135  |  98  |  16  ----------------------------<  428<H>  4.6   |  26  |  0.83    Ca    8.7      02 May 2023 05:30  Phos  3.8     05-02  Mg     2.1     05-02      PT/INR - ( 01 May 2023 05:30 )   PT: 12.1 sec;   INR: 1.02          PTT - ( 01 May 2023 05:30 )  PTT:34.5 sec    CAPILLARY BLOOD GLUCOSE      POCT Blood Glucose.: 347 mg/dL (02 May 2023 09:21)      RADIOLOGY & ADDITIONAL TESTS: Reviewed.   CC: Patient is a 43y old  Female who presents with a chief complaint of severe sepsis (02 May 2023 11:45)    INTERVAL EVENTS: FAUSTINO    SUBJECTIVE / INTERVAL HPI: Patient seen and examined at bedside. Still c/o lower abdominal pain and persistent headaches only relieved with IV Dilaudid. Able to eat full meals now, maintaining good appetite without nausea/vomiting.    ROS: negative unless otherwise stated above.    VITAL SIGNS:  Vital Signs Last 24 Hrs  T(C): 37.1 (02 May 2023 05:53), Max: 37.3 (01 May 2023 20:55)  T(F): 98.7 (02 May 2023 05:53), Max: 99.1 (01 May 2023 20:55)  HR: 96 (02 May 2023 05:53) (96 - 109)  BP: 115/80 (02 May 2023 05:53) (107/74 - 115/80)  RR: 18 (02 May 2023 05:53) (16 - 18)  SpO2: 98% (02 May 2023 05:53) (97% - 98%)    Parameters below as of 02 May 2023 05:53  Patient On (Oxygen Delivery Method): room air      05-01-23 @ 07:01  -  05-02-23 @ 07:00  --------------------------------------------------------  IN: 0 mL / OUT: 2100 mL / NET: -2100 mL      PHYSICAL EXAM:  Constitutional: obese female resting comfortably; NAD  HEENT: NC/AT, anicteric sclera, MMM  Neck: supple; no JVD or thyromegaly  Respiratory: CTA B/L; no W/R/R, no retractions  Cardiac: +S1/S2; RRR; no M/R/G  Gastrointestinal: soft, NT/ND; no rebound or guarding; +BS, Suprapubic catheter noted, retracted ~3-4 cm from original location, with surround erythema & irregular borders. Draining clear yellow urine. Ostomy bag intact with brown stool noted. Skin grafts well healed.  Extremities: WWP, no clubbing or cyanosis; no peripheral edema  Musculoskeletal: NROM x4; no joint swelling, tenderness or erythema  Dermatologic: skin warm, dry and intact; no rashes, wounds, or scars  Neurologic: AAOx3, no focal deficits  Psychiatric: anxious, crying    MEDICATIONS:  MEDICATIONS  (STANDING):  apixaban 5 milliGRAM(s) Oral every 12 hours  dextrose 5%. 1000 milliLiter(s) (100 mL/Hr) IV Continuous <Continuous>  dextrose 5%. 1000 milliLiter(s) (50 mL/Hr) IV Continuous <Continuous>  dextrose 50% Injectable 25 Gram(s) IV Push once  dextrose 50% Injectable 12.5 Gram(s) IV Push once  dextrose 50% Injectable 25 Gram(s) IV Push once  glucagon  Injectable 1 milliGRAM(s) IntraMuscular once  insulin glargine Injectable (LANTUS) 30 Unit(s) SubCutaneous once  insulin glargine Injectable (LANTUS) 35 Unit(s) SubCutaneous at bedtime  insulin lispro (ADMELOG) corrective regimen sliding scale   SubCutaneous Before meals and at bedtime  insulin lispro Injectable (ADMELOG) 20 Unit(s) SubCutaneous three times a day before meals  labetalol 100 milliGRAM(s) Oral every 24 hours  polyethylene glycol 3350 17 Gram(s) Oral daily  senna 2 Tablet(s) Oral at bedtime    MEDICATIONS  (PRN):  acetaminophen     Tablet .. 650 milliGRAM(s) Oral every 6 hours PRN Temp greater or equal to 38C (100.4F), Mild Pain (1 - 3)  albuterol    90 MICROgram(s) HFA Inhaler 1 Puff(s) Inhalation daily PRN Shortness of Breath and/or Wheezing  aluminum hydroxide/magnesium hydroxide/simethicone Suspension 30 milliLiter(s) Oral every 4 hours PRN Dyspepsia  dextrose Oral Gel 15 Gram(s) Oral once PRN Blood Glucose LESS THAN 70 milliGRAM(s)/deciliter  HYDROmorphone   Tablet 2 milliGRAM(s) Oral every 8 hours PRN Moderate Pain (4 - 6)  melatonin 3 milliGRAM(s) Oral at bedtime PRN Insomnia  ondansetron Injectable 4 milliGRAM(s) IV Push every 8 hours PRN Nausea and/or Vomiting  oxyCODONE    IR 10 milliGRAM(s) Oral every 6 hours PRN Severe Pain (7 - 10)      ALLERGIES:  Allergies    clindamycin (Pruritus)  fish (Hives; Urticaria)  amoxicillin (Unknown)  iodine containing compounds (Hives; Anaphylaxis)  shellfish. (Hives; Anaphylaxis)  penicillin (Hives)    Intolerances    Bactrim I.V. (Rash (Mod to Severe))      LABS:                        11.9   8.68  )-----------( 417      ( 02 May 2023 05:30 )             40.6     05-02    135  |  98  |  16  ----------------------------<  428<H>  4.6   |  26  |  0.83    Ca    8.7      02 May 2023 05:30  Phos  3.8     05-02  Mg     2.1     05-02      PT/INR - ( 01 May 2023 05:30 )   PT: 12.1 sec;   INR: 1.02          PTT - ( 01 May 2023 05:30 )  PTT:34.5 sec    CAPILLARY BLOOD GLUCOSE      POCT Blood Glucose.: 347 mg/dL (02 May 2023 09:21)      RADIOLOGY & ADDITIONAL TESTS: Reviewed.

## 2023-05-02 NOTE — CHART NOTE - NSCHARTNOTEFT_GEN_A_CORE
Admitting Diagnosis:   Patient is a 43y old  Female who presents with a chief complaint of severe sepsis (02 May 2023 12:19)      PAST MEDICAL & SURGICAL HISTORY:  Essential hypertension      Hyperlipidemia      Diabetes      Obesity      Abdominal hernia      Pre-eclampsia      Pulmonary embolism      H/O LEEP      History of D&C      H/O:       H/O ventral hernia repair      H/O exploratory laparotomy          Current Nutrition Order:  CONSCHO  DASH/TLC  - NPO after midnight     PO Intake: Good (%) [   ]  Fair (50-75%) [   ] Poor (<25%) [   ] - unable to assess, see below     GI Issues:   No nvdc noted, LBM 5/1  with LLQ colostomy    Pain:  per chart endorses severe pain to head and abdomen     Skin Integrity:  wnl, with healed wounds  nando scale 18; no edema noted; no PUs noted       Labs:       135  |  98  |  16  ----------------------------<  428<H>  4.6   |  26  |  0.83    Ca    8.7      02 May 2023 05:30  Phos  3.8     05-  Mg     2.1     05-02      CAPILLARY BLOOD GLUCOSE      POCT Blood Glucose.: 287 mg/dL (02 May 2023 12:57)  POCT Blood Glucose.: 347 mg/dL (02 May 2023 09:21)  POCT Blood Glucose.: 397 mg/dL (01 May 2023 22:18)  POCT Blood Glucose.: 389 mg/dL (01 May 2023 17:31)      Medications:  MEDICATIONS  (STANDING):  apixaban 5 milliGRAM(s) Oral every 12 hours  dextrose 5%. 1000 milliLiter(s) (100 mL/Hr) IV Continuous <Continuous>  dextrose 5%. 1000 milliLiter(s) (50 mL/Hr) IV Continuous <Continuous>  dextrose 50% Injectable 25 Gram(s) IV Push once  dextrose 50% Injectable 12.5 Gram(s) IV Push once  dextrose 50% Injectable 25 Gram(s) IV Push once  glucagon  Injectable 1 milliGRAM(s) IntraMuscular once  insulin glargine Injectable (LANTUS) 35 Unit(s) SubCutaneous at bedtime  insulin lispro (ADMELOG) corrective regimen sliding scale   SubCutaneous Before meals and at bedtime  insulin lispro Injectable (ADMELOG) 20 Unit(s) SubCutaneous three times a day before meals  labetalol 100 milliGRAM(s) Oral every 24 hours  polyethylene glycol 3350 17 Gram(s) Oral daily  senna 2 Tablet(s) Oral at bedtime    MEDICATIONS  (PRN):  acetaminophen     Tablet .. 650 milliGRAM(s) Oral every 6 hours PRN Temp greater or equal to 38C (100.4F), Mild Pain (1 - 3)  albuterol    90 MICROgram(s) HFA Inhaler 1 Puff(s) Inhalation daily PRN Shortness of Breath and/or Wheezing  aluminum hydroxide/magnesium hydroxide/simethicone Suspension 30 milliLiter(s) Oral every 4 hours PRN Dyspepsia  dextrose Oral Gel 15 Gram(s) Oral once PRN Blood Glucose LESS THAN 70 milliGRAM(s)/deciliter  HYDROmorphone   Tablet 2 milliGRAM(s) Oral every 8 hours PRN Moderate Pain (4 - 6)  melatonin 3 milliGRAM(s) Oral at bedtime PRN Insomnia  ondansetron Injectable 4 milliGRAM(s) IV Push every 8 hours PRN Nausea and/or Vomiting  oxyCODONE    IR 10 milliGRAM(s) Oral every 6 hours PRN Severe Pain (7 - 10)      Weight:  Daily     Daily     Weight Change: no new wts to review at this time, last wt taken on admit . Please obtain current wt and then daily wts to monitor trends and better assess nutrition adequacy     Estimated energy needs:   Weight used for calculations	IBW  Estimated Energy Needs Weight (lbs)	135 lb  Estimated Energy Needs Weight (kg)	61.2 kg  Estimated Energy Needs From (janice/kg) 30  Estimated Energy Needs To (janice/kg)	35  Estimated Energy Needs Calculated From (janice/kg) 1836  Estimated Energy Needs Calculated To (janice/kg)	2142  Estimated Protein Needs From (g/kg)	1.2  Estimated Protein Needs To (g/kg)	1.4  Estimated Protein Needs Calculated From (g/kg) 73.44  Estimated Protein Needs Calculated To (g/kg)	85.68  Estimated Fluid Needs From (ml/kg)	30  Estimated Fluid Needs To (ml/kg)	35  Estimated Fluid Needs Calculated From (ml/kg)	1836  Estimated Fluid Needs Calculated To (ml/kg)	2142  Other Calculations: Ideal body weight used to calculate energy needs due to pt's current body weight >120% ideal body weight. Adjust for viral infection, age.    Subjective:   43F PMH DM, HTN, HLD, Abdominal Necrotizing fascitis (s/p suprapubic catheter and ostomy bag placement on 21 at [facility]), p/w several complaints. Pt states that she has several weeks of increasing pain to suprapubic catheter site as well as lower abdomen. Admitted for severe sepsis.  Ordered COVID swab and made NPO for IR suprapubic catheter exchange tomorrow. Started lantus 20u and lispro 2u w/ meals per endo recs. COVID negative. : severe migraine and abdominal pain. 35 lantus, 20 premeal per Endo. Pt with new bleeding and erythema of the suprapubic catheter site. Discharge delayed. : Ordered CT A/P noncon for IR to eval suprapubic cath for possible exchange.    Visited pt at bedside this AM for f/u policy. Pt not amenable to interview at this time, in severe pain and lethargic. Unable to assess intake patterns; noted ordered 2 yogurt parfait this AM however left untouched. Per chart review, no nvdc - remains on bowel regimen. Nutrition related labs reviewed; high FSBG 347, 397, 389 - insulin regimen ordered; Na wnl - low prior. Pt remains at high nutritional risk. See recs below     Previous Nutrition Diagnosis:  1. Increased Nutrient Needs (kcal, protein) r/t hypermetabolic state AEB viral infection  2. Inadequate Energy Intake r/t severe lower abdominal pain; nausea/vomiting AEB poor PO intake x3 days pta; poor PO tolerance per pt report    Active [ X ]  Resolved [   ]    Goal:  - able to tolerate diet order   - Consistently meets > 75% EER    Recommendations:  1. Continue current diet order (DASH/TLC; CONSCHO)  >> Monitor need to liberalize diet   2. Monitor PO intake; honor pt food preferences as able  3. Monitor lytes, replete prn   4. Continue insulin/pain regimen per team   5. Monitor GI fxn, skin integrity, wts, chemistry   6. RD to reinforce diet edu at f/u prn   7. RD to remain available for recs adjustment prn or will follow up pt per organizational policy    Education: deferred     Risk Level: High [ X ] Moderate [   ] Low [   ]

## 2023-05-02 NOTE — PROGRESS NOTE ADULT - ASSESSMENT
43F h/o abdominal necrotizing fasciitis s/p suprapubic catheter and ostomy bag placement on 11/11/21 at [facility], recent admission for UTI 2/2 MDRO p/w worsening suprapubic pain, likely CAUTI.  4/4 UCx grew MDR K.pneumo. Pt completed 5 day course of gentamycin with improvement in WBC.     - Will monitor off antibiotics. Suprapubic pain likely mechanical vs infection.   - F/u CT abdomen + pelvis  - Pt will need TOV with  outpatient    Team 1 will follow.

## 2023-05-02 NOTE — PROGRESS NOTE ADULT - ASSESSMENT
43F PMH DM, HTN, HLD, Abdominal Necrotizing fascitis (s/p suprapubic catheter and ostomy bag placement on 11/11/21 at [facility]), p/w several complaints. Pt states that she has several weeks of increasing pain to suprapubic catheter site as well as lower abdomen. Admitted for severe sepsis.

## 2023-05-02 NOTE — PROGRESS NOTE ADULT - PROBLEM SELECTOR PLAN 4
Hx of abdominal nec fasc with bowel resection and ostomy. Also has suprapubic catheter exchanged 3/24 at [facility]. Initially required placement due to abdominal necrotizing fascitis. Which also required skin graft of R medial thigh, R gluteal cleft, R labia.   - Ostomy care and wound care education

## 2023-05-02 NOTE — PROGRESS NOTE ADULT - SUBJECTIVE AND OBJECTIVE BOX
SUBJECTIVE / INTERVAL HPI: Patient was seen and examined this morning. She reports pain 10/10 to head, abdomen and back. Temp 99.1 last night, otherwise afebrile. No leukocytosis--on gentamycin. Abdomen is TTP. Made NPO at [facility] for CTAP today.     CAPILLARY BLOOD GLUCOSE & INSULIN RECEIVED  302 mg/dL (05-01 @ 05:30)  250 mg/dL (05-01 @ 09:07)  219 mg/dL (05-01 @ 12:54)  389 mg/dL (05-01 @ 17:31) - Lispro 2+10  397 mg/dL (05-01 @ 22:18) - Lantus 35. Lispro 10+2  428 mg/dL (05-02 @ 05:30)  347 mg/dL (05-02 @ 09:21) - Lispro 8. Additional Lantus 30 ordered.    REVIEW OF SYSTEMS  Constitutional:  Negative fever, chills or loss of appetite. + headache  Eyes:  Negative blurry vision or double vision.  Cardiovascular:  Negative for chest pain or palpitations.  Respiratory:  Negative for cough, wheezing, or shortness of breath.    Gastrointestinal:  Negative for nausea, vomiting, diarrhea, constipation, + abdominal pain.  Genitourinary:  Negative frequency, urgency or dysuria.  Neurologic:  No headache, confusion, dizziness, lightheadedness.    PHYSICAL EXAM  Vital Signs Last 24 Hrs  T(C): 37.1 (02 May 2023 05:53), Max: 37.3 (01 May 2023 20:55)  T(F): 98.7 (02 May 2023 05:53), Max: 99.1 (01 May 2023 20:55)  HR: 96 (02 May 2023 05:53) (96 - 109)  BP: 115/80 (02 May 2023 05:53) (107/74 - 115/80)  BP(mean): --  RR: 18 (02 May 2023 05:53) (16 - 18)  SpO2: 98% (02 May 2023 05:53) (97% - 98%)    Parameters below as of 02 May 2023 05:53  Patient On (Oxygen Delivery Method): room air    Constitutional: Awake, alert, female, in no acute distress. OBese.  HEENT: Normocephalic, atraumatic, NAOMI.  Respiratory: Lungs clear to ausculation bilaterally.   Cardiovascular: regular rhythm, normal S1 and S2, no audible murmurs.   GI: soft, mildly tender, non-distended, (+) Ostomy. (+) Suprapubic catheter.   Extremities: No lower extremity edema.  Psychiatric: AAO x 3. Normal affect/mood.     LABS  CBC - WBC/HGB/HTC/PLT: 8.68/11.9/40.6/417 (05-02-23)  BMP - Na/K/Cl/Bicarb/BUN/Cr/Gluc/AG/eGFR: 135/4.6/98/26/16/0.83/428/11/90 (05-02-23)  Ca - 8.7 (05-02-23)  Phos - 3.8 (05-02-23)  Mg - 2.1 (05-02-23)  LFT - Alb/Tprot/Tbili/Dbili/AlkPhos/ALT/AST: 2.8/--/<0.2/--/124/9/12 (04-28-23)  PT/aPTT/INR: 12.1/34.5/1.02 (05-01-23)   Thyroid Stimulating Hormone, Serum: 0.851 (04-28-23)      MEDICATIONS  MEDICATIONS  (STANDING):  apixaban 5 milliGRAM(s) Oral every 12 hours  dextrose 5%. 1000 milliLiter(s) (50 mL/Hr) IV Continuous <Continuous>  dextrose 5%. 1000 milliLiter(s) (100 mL/Hr) IV Continuous <Continuous>  dextrose 50% Injectable 25 Gram(s) IV Push once  dextrose 50% Injectable 12.5 Gram(s) IV Push once  dextrose 50% Injectable 25 Gram(s) IV Push once  glucagon  Injectable 1 milliGRAM(s) IntraMuscular once  insulin glargine Injectable (LANTUS) 35 Unit(s) SubCutaneous at bedtime  insulin glargine Injectable (LANTUS) 30 Unit(s) SubCutaneous once  insulin lispro (ADMELOG) corrective regimen sliding scale   SubCutaneous Before meals and at bedtime  insulin lispro Injectable (ADMELOG) 20 Unit(s) SubCutaneous three times a day before meals  labetalol 100 milliGRAM(s) Oral every 24 hours  polyethylene glycol 3350 17 Gram(s) Oral daily  senna 2 Tablet(s) Oral at bedtime    MEDICATIONS  (PRN):  acetaminophen     Tablet .. 650 milliGRAM(s) Oral every 6 hours PRN Temp greater or equal to 38C (100.4F), Mild Pain (1 - 3)  albuterol    90 MICROgram(s) HFA Inhaler 1 Puff(s) Inhalation daily PRN Shortness of Breath and/or Wheezing  aluminum hydroxide/magnesium hydroxide/simethicone Suspension 30 milliLiter(s) Oral every 4 hours PRN Dyspepsia  dextrose Oral Gel 15 Gram(s) Oral once PRN Blood Glucose LESS THAN 70 milliGRAM(s)/deciliter  HYDROmorphone   Tablet 2 milliGRAM(s) Oral every 8 hours PRN Moderate Pain (4 - 6)  melatonin 3 milliGRAM(s) Oral at bedtime PRN Insomnia  ondansetron Injectable 4 milliGRAM(s) IV Push every 8 hours PRN Nausea and/or Vomiting  oxyCODONE    IR 10 milliGRAM(s) Oral every 6 hours PRN Severe Pain (7 - 10)

## 2023-05-02 NOTE — PROGRESS NOTE ADULT - SUBJECTIVE AND OBJECTIVE BOX
***********incomplete********* INFECTIOUS DISEASES CONSULT FOLLOW-UP NOTE    INTERVAL HPI/OVERNIGHT EVENTS: Patient with suprapubic cath bleeding and erythema yesterday. Pt seen and examined bedside. Still complaining of diffuse abdominal pain and headache.       ROS:   Constitutional, eyes, ENT, cardiovascular, respiratory, gastrointestinal, genitourinary, integumentary, neurological, psychiatric and heme/lymph are otherwise negative other than noted above       ANTIBIOTICS/RELEVANT:    MEDICATIONS  (STANDING):  apixaban 5 milliGRAM(s) Oral every 12 hours  dextrose 5%. 1000 milliLiter(s) (100 mL/Hr) IV Continuous <Continuous>  dextrose 5%. 1000 milliLiter(s) (50 mL/Hr) IV Continuous <Continuous>  dextrose 50% Injectable 25 Gram(s) IV Push once  dextrose 50% Injectable 12.5 Gram(s) IV Push once  dextrose 50% Injectable 25 Gram(s) IV Push once  glucagon  Injectable 1 milliGRAM(s) IntraMuscular once  HYDROmorphone  Injectable 1 milliGRAM(s) IV Push Once  insulin glargine Injectable (LANTUS) 35 Unit(s) SubCutaneous at bedtime  insulin lispro (ADMELOG) corrective regimen sliding scale   SubCutaneous Before meals and at bedtime  insulin lispro Injectable (ADMELOG) 20 Unit(s) SubCutaneous three times a day before meals  labetalol 100 milliGRAM(s) Oral every 24 hours  polyethylene glycol 3350 17 Gram(s) Oral daily  senna 2 Tablet(s) Oral at bedtime    MEDICATIONS  (PRN):  acetaminophen     Tablet .. 650 milliGRAM(s) Oral every 6 hours PRN Temp greater or equal to 38C (100.4F), Mild Pain (1 - 3)  albuterol    90 MICROgram(s) HFA Inhaler 1 Puff(s) Inhalation daily PRN Shortness of Breath and/or Wheezing  aluminum hydroxide/magnesium hydroxide/simethicone Suspension 30 milliLiter(s) Oral every 4 hours PRN Dyspepsia  dextrose Oral Gel 15 Gram(s) Oral once PRN Blood Glucose LESS THAN 70 milliGRAM(s)/deciliter  HYDROmorphone   Tablet 2 milliGRAM(s) Oral every 8 hours PRN Moderate Pain (4 - 6)  melatonin 3 milliGRAM(s) Oral at bedtime PRN Insomnia  ondansetron Injectable 4 milliGRAM(s) IV Push every 8 hours PRN Nausea and/or Vomiting  oxyCODONE    IR 10 milliGRAM(s) Oral every 6 hours PRN Severe Pain (7 - 10)        Vital Signs Last 24 Hrs  T(C): 37.1 (02 May 2023 05:53), Max: 37.3 (01 May 2023 20:55)  T(F): 98.7 (02 May 2023 05:53), Max: 99.1 (01 May 2023 20:55)  HR: 96 (02 May 2023 05:53) (96 - 109)  BP: 115/80 (02 May 2023 05:53) (107/74 - 115/80)  BP(mean): --  RR: 18 (02 May 2023 05:53) (16 - 18)  SpO2: 98% (02 May 2023 05:53) (97% - 98%)    Parameters below as of 02 May 2023 05:53  Patient On (Oxygen Delivery Method): room air        05-01-23 @ 07:01  -  05-02-23 @ 07:00  --------------------------------------------------------  IN: 0 mL / OUT: 2100 mL / NET: -2100 mL      PHYSICAL EXAM:  Constitutional: NAD  Eyes: NAOMI, EOMI  Ear/Nose/Throat: no oral lesion, no sinus tenderness on percussion	  Respiratory: CTA b/l  Cardiovascular: S1S2 RRR, no murmurs  Gastrointestinal: soft, (+) BS, no HSM, distended abdomen, TTP with palpation in all quadrants of abdomen, catheter in place with mild surrounding erythema  Extremities: no peripheral edema, WWP      LABS:                        11.9   8.68  )-----------( 417      ( 02 May 2023 05:30 )             40.6     05-02    135  |  98  |  16  ----------------------------<  428<H>  4.6   |  26  |  0.83    Ca    8.7      02 May 2023 05:30  Phos  3.8     05-02  Mg     2.1     05-02      PT/INR - ( 01 May 2023 05:30 )   PT: 12.1 sec;   INR: 1.02          PTT - ( 01 May 2023 05:30 )  PTT:34.5 sec      MICROBIOLOGY:      RADIOLOGY & ADDITIONAL STUDIES:  Reviewed

## 2023-05-02 NOTE — CHART NOTE - NSCHARTNOTEFT_GEN_A_CORE
Search Date: 05/02/2023 16:46:36 PM  The Drug Utilization Report below displays all of the controlled substance prescriptions, if any, that your patient has filled in the last twelve months. The information displayed on this report is compiled from pharmacy submissions to the Department, and accurately reflects the information as submitted by the pharmacies.    This report was requested by: Orlando Fair | Reference #: [id number]    Practitioner Count: 0  Pharmacy Count: 0  Current Opioid Prescriptions: 0  Current Benzodiazepine Prescriptions: 0  Current Stimulant Prescriptions: 0      Patient Demographic Information (PDI)       PDI	First Name	Last Name	Birth Date	Gender	Street Address	[facility] [place]	Zip Code  A	Rachel Katz	1980	Female	[address] [facility]	[address]  B	Rachel Katz	1980	Female	[address] [facility]	[address]    Prescription Information      PDI Filter:    PDI	My Rx	Current Rx	Drug Type	Rx Written	Rx Dispensed	Drug	Quantity	Days Supply	Prescriber Name	Prescriber ALEE #	Payment Method  A	N	N	O	11/27/2022	12/01/2022	oxycodone hcl (ir) 10 mg tab	60	15	Amadou Soto MD	[id number]	Insurance  Dispenser [facility]  A	N	N	O	11/14/2022	11/15/2022	oxycodone hcl (ir) 20 mg tab	120	30	Amadou Soto MD	[id number]	Insurance  Dispenser [facility]  A	N	N	O	10/23/2022	11/01/2022	oxycodone hcl (ir) 20 mg tab	56	14	Laborde, Jean Claude [facility]	[id number]	Insurance  Dispenser [facility]  A	N	N	O	10/23/2022	10/23/2022	oxycodone hcl (ir) 10 mg tab	56	14	Laborde, Jean Claude FN	[id number]	Cheek  Dispenser [facility]  A	N	N	O	10/16/2022	10/16/2022	oxycodone hcl (ir) 20 mg tab	60	30	Laborde, Jean Claude FN	[id number]	Insurance  Dispenser [facility]  A	N	N	O	10/09/2022	10/09/2022	oxycodone hcl (ir) 10 mg tab	28	7	Pelon Leal MD	[id number]	Insurance  Dispenser [facility]  A	N	N	O	10/02/2022	10/03/2022	oxycodone hcl (ir) 20 mg tab	28	7	Pelon Leal MD	[id number]	Insurance  Dispenser [facility]  A	N	N	O	10/01/2022	10/01/2022	oxycodone hcl (ir) 10 mg tab	28	7	Pelon Leal MD	[id number]	Insurance  Dispenser [facility]  A	N	N	O	09/15/2022	09/15/2022	oxycodone hcl (ir) 30 mg tab	60	15	Amadou Soto MD	[id number]	Insurance  Dispenser [facility]  B	N	N	O	07/15/2022	07/24/2022	oxycodone hcl (ir) 30 mg tab	90	30	Adrienne Cruz MD	[id number]	Insurance  Dispenser [facility]  B	N	N	O	06/28/2022	07/15/2022	oxycodone hcl (ir) 30 mg tab	30	10	Adrienne Cruz MD	[id number]	Insurance  Dispenser [facility]  B	N	N	O	06/28/2022	07/11/2022	oxycodone hcl (ir) 30 mg tab	9	3	Adrienne Cruz MD	[id number]	Other

## 2023-05-02 NOTE — PROGRESS NOTE ADULT - ATTENDING COMMENTS
Patient with blood from suprapubic catheter yesterday, urine draining, creatinine stable. Patient reports pain all over her body, denies fevers, no chills. Discussed with IR, plan to get CT abdomen/pelvis and based on findings might consider catheter exchange. No other complaints or events reported.    Labs reviewed    Patient with blood from cathter, follow-up CT , IR follow-up based on results  Patient finished ATB course  Continue on insulin per ID  Patient with chronic pain, non-specific all over her body. No vomiting, titrate PO regimen, avoid IV.

## 2023-05-02 NOTE — PROGRESS NOTE ADULT - ATTENDING COMMENTS
#CAUTI, MDRO K.pneumo infection    Patient had bleeding from cath site yesterday and discharge was cancelled.  Per team, awaiting CT a/p to assess.  Patient continues to c/o suprapubic tenderness and said abx didn't help.  Unclear if her pain is due to UTi or mechanical, since she did receive appropriate abx course.  Monitor off abx.  will follow up CT a/p.   She will need TOV with  as outpatient.        Team 1 will follow you.  Case d/w primary team.    Stephanie Brandon MD, MS  Infectious Disease attending  work cell [phone]   For any questions during evening/weekend/holiday, please page ID on call . #CAUTI, MDRO K.pneumo infection    Patient had bleeding from cath site yesterday and discharge was cancelled.  Per team, awaiting CT a/p to assess.  Patient continues to c/o suprapubic tenderness and said abx didn't help.  Unclear if her pain is due to UTi or mechanical, since she did receive appropriate abx course.  Monitor off abx.  will follow up CT a/p.   She will need TOV with  as outpatient.      ## ADDENDUM ##    CT a/p reviewed.  Suprapubic catheter remains within the collapsed bladder lumen.  No e/o cystitis.   No further indication for abx.  Patient should follow up with  for TOV.    Thank you for your consult.  Please re-consult us or call us with questions.  Case d/w primary team.    Stephanie Brandon MD, MS  Infectious Disease attending  work cell [phone]  For any questions during evening/weekend/holiday, please page ID on call

## 2023-05-02 NOTE — PROGRESS NOTE ADULT - PROBLEM SELECTOR PLAN 1
i/s/o of Urinary tract infection, SIRS 2/4 for tachycardia and WBC +lactic acidosis & suspected source of infection. Most recent Culture data reveals negative BCx with positive UCx on 4/4/23 which showed 2 various strains of K. pneumoniae (one ESBL and 1 carbapenem resistant), At that time the patient was seen in the ED (culture obtained via suprapubic catheter), and subsequently discharged home. When culture speciated was contacted and asked ot report to ED for IV ABx however patient never did. Unclear if this culture data represents chronic colonization v acute sepsis 2/2 UTI.   - ID consulted; appreciate recs  - c/w gentamicin 500mg IVPB q24h x5 day total course  - Urine culture likely contaminated due to being collected from drainage bag, according to ID  - f/u blood culture--NGTD x3 days  - IR consulted to switch out suprapubic catheter and repeat UCX i/s/o of Urinary tract infection, SIRS 2/4 for tachycardia and WBC +lactic acidosis & suspected source of infection. Most recent Culture data reveals negative BCx with positive UCx on 4/4/23 which showed 2 various strains of K. pneumoniae (one ESBL and 1 carbapenem resistant), At that time the patient was seen in the ED (culture obtained via suprapubic catheter), and subsequently discharged home. When culture speciated was contacted and asked ot report to ED for IV ABx however patient never did. Unclear if this culture data represents chronic colonization v acute sepsis 2/2 UTI.   - ID consulted; appreciate recs  - c/w gentamicin 500mg IVPB q24h x5 day total course  - Urine culture likely contaminated due to being collected from drainage bag, according to ID  - Blood cultures NGTD x3 days  - IR consulted to switch out suprapubic catheter and repeat UCX  - Will obtain CT abdomen/pelvis to visualize suprapubic cath

## 2023-05-03 ENCOUNTER — TRANSCRIPTION ENCOUNTER (OUTPATIENT)
Age: 43
End: 2023-05-03

## 2023-05-03 VITALS
RESPIRATION RATE: 18 BRPM | TEMPERATURE: 99 F | SYSTOLIC BLOOD PRESSURE: 137 MMHG | OXYGEN SATURATION: 94 % | DIASTOLIC BLOOD PRESSURE: 80 MMHG | HEART RATE: 100 BPM

## 2023-05-03 PROBLEM — I26.99 OTHER PULMONARY EMBOLISM WITHOUT ACUTE COR PULMONALE: Chronic | Status: ACTIVE | Noted: 2023-04-28

## 2023-05-03 LAB
GLUCOSE BLDC GLUCOMTR-MCNC: 275 MG/DL — HIGH (ref 70–99)
GLUCOSE BLDC GLUCOMTR-MCNC: 419 MG/DL — HIGH (ref 70–99)

## 2023-05-03 PROCEDURE — 83605 ASSAY OF LACTIC ACID: CPT

## 2023-05-03 PROCEDURE — 83036 HEMOGLOBIN GLYCOSYLATED A1C: CPT

## 2023-05-03 PROCEDURE — 84295 ASSAY OF SERUM SODIUM: CPT

## 2023-05-03 PROCEDURE — 86900 BLOOD TYPING SEROLOGIC ABO: CPT

## 2023-05-03 PROCEDURE — 80170 ASSAY OF GENTAMICIN: CPT

## 2023-05-03 PROCEDURE — 84132 ASSAY OF SERUM POTASSIUM: CPT

## 2023-05-03 PROCEDURE — 80053 COMPREHEN METABOLIC PANEL: CPT

## 2023-05-03 PROCEDURE — 99285 EMERGENCY DEPT VISIT HI MDM: CPT

## 2023-05-03 PROCEDURE — 84100 ASSAY OF PHOSPHORUS: CPT

## 2023-05-03 PROCEDURE — 97161 PT EVAL LOW COMPLEX 20 MIN: CPT

## 2023-05-03 PROCEDURE — 84702 CHORIONIC GONADOTROPIN TEST: CPT

## 2023-05-03 PROCEDURE — 82962 GLUCOSE BLOOD TEST: CPT

## 2023-05-03 PROCEDURE — 87086 URINE CULTURE/COLONY COUNT: CPT

## 2023-05-03 PROCEDURE — 96375 TX/PRO/DX INJ NEW DRUG ADDON: CPT

## 2023-05-03 PROCEDURE — 82330 ASSAY OF CALCIUM: CPT

## 2023-05-03 PROCEDURE — 86850 RBC ANTIBODY SCREEN: CPT

## 2023-05-03 PROCEDURE — 83735 ASSAY OF MAGNESIUM: CPT

## 2023-05-03 PROCEDURE — 87635 SARS-COV-2 COVID-19 AMP PRB: CPT

## 2023-05-03 PROCEDURE — 81001 URINALYSIS AUTO W/SCOPE: CPT

## 2023-05-03 PROCEDURE — 74176 CT ABD & PELVIS W/O CONTRAST: CPT | Mod: MA

## 2023-05-03 PROCEDURE — 85027 COMPLETE CBC AUTOMATED: CPT

## 2023-05-03 PROCEDURE — 84443 ASSAY THYROID STIM HORMONE: CPT

## 2023-05-03 PROCEDURE — 71045 X-RAY EXAM CHEST 1 VIEW: CPT

## 2023-05-03 PROCEDURE — 86901 BLOOD TYPING SEROLOGIC RH(D): CPT

## 2023-05-03 PROCEDURE — 86880 COOMBS TEST DIRECT: CPT

## 2023-05-03 PROCEDURE — 93005 ELECTROCARDIOGRAM TRACING: CPT

## 2023-05-03 PROCEDURE — 85610 PROTHROMBIN TIME: CPT

## 2023-05-03 PROCEDURE — 87040 BLOOD CULTURE FOR BACTERIA: CPT

## 2023-05-03 PROCEDURE — 85730 THROMBOPLASTIN TIME PARTIAL: CPT

## 2023-05-03 PROCEDURE — 96374 THER/PROPH/DIAG INJ IV PUSH: CPT

## 2023-05-03 PROCEDURE — 82803 BLOOD GASES ANY COMBINATION: CPT

## 2023-05-03 PROCEDURE — 36415 COLL VENOUS BLD VENIPUNCTURE: CPT

## 2023-05-03 PROCEDURE — 85025 COMPLETE CBC W/AUTO DIFF WBC: CPT

## 2023-05-03 PROCEDURE — 80048 BASIC METABOLIC PNL TOTAL CA: CPT

## 2023-05-03 RX ORDER — INSULIN GLARGINE 100 [IU]/ML
30 INJECTION, SOLUTION SUBCUTANEOUS ONCE
Refills: 0 | Status: COMPLETED | OUTPATIENT
Start: 2023-05-03 | End: 2023-05-03

## 2023-05-03 RX ADMIN — HYDROMORPHONE HYDROCHLORIDE 2 MILLIGRAM(S): 2 INJECTION INTRAMUSCULAR; INTRAVENOUS; SUBCUTANEOUS at 17:21

## 2023-05-03 RX ADMIN — Medication 6: at 13:39

## 2023-05-03 RX ADMIN — OXYCODONE HYDROCHLORIDE 10 MILLIGRAM(S): 5 TABLET ORAL at 07:49

## 2023-05-03 RX ADMIN — Medication 100 MILLIGRAM(S): at 09:09

## 2023-05-03 RX ADMIN — HYDROMORPHONE HYDROCHLORIDE 2 MILLIGRAM(S): 2 INJECTION INTRAMUSCULAR; INTRAVENOUS; SUBCUTANEOUS at 01:22

## 2023-05-03 RX ADMIN — Medication 20 UNIT(S): at 09:10

## 2023-05-03 RX ADMIN — INSULIN GLARGINE 30 UNIT(S): 100 INJECTION, SOLUTION SUBCUTANEOUS at 13:41

## 2023-05-03 RX ADMIN — Medication 12: at 09:09

## 2023-05-03 RX ADMIN — OXYCODONE HYDROCHLORIDE 10 MILLIGRAM(S): 5 TABLET ORAL at 00:30

## 2023-05-03 RX ADMIN — APIXABAN 5 MILLIGRAM(S): 2.5 TABLET, FILM COATED ORAL at 07:49

## 2023-05-03 RX ADMIN — Medication 20 UNIT(S): at 13:42

## 2023-05-03 RX ADMIN — OXYCODONE HYDROCHLORIDE 10 MILLIGRAM(S): 5 TABLET ORAL at 15:00

## 2023-05-03 RX ADMIN — POLYETHYLENE GLYCOL 3350 17 GRAM(S): 17 POWDER, FOR SOLUTION ORAL at 13:41

## 2023-05-03 RX ADMIN — OXYCODONE HYDROCHLORIDE 10 MILLIGRAM(S): 5 TABLET ORAL at 14:13

## 2023-05-03 NOTE — PROGRESS NOTE ADULT - PROBLEM SELECTOR PROBLEM 2
Type 2 diabetes mellitus with hyperglycemia
Diabetes
Type 2 diabetes mellitus with hyperglycemia

## 2023-05-03 NOTE — DISCHARGE NOTE NURSING/CASE MANAGEMENT/SOCIAL WORK - PATIENT PORTAL LINK FT
You can access the FollowMyHealth Patient Portal offered by [facility] by registering at the following website: [email or web address]. By joining [facility]’s FollowMyHealth portal, you will also be able to view your health information using other applications (apps) compatible with our system.

## 2023-05-03 NOTE — PROGRESS NOTE ADULT - ATTENDING COMMENTS
Patient feeling at baseline, catheter draining, afebrile. FS uncontrolled, Endocrinology following up. She is agreeable to discharge with Urology follow-up as outpatient, finished ATB course.     Labs, imaging reviewed    Patient s/p UTI treatment with Gentamycin, no indication for catheter exhange per IR. Urology as outpatient for TOV  Appreciate Endocrinology   PCP follow-up as outpatient  Continue AC Patient feeling at baseline, catheter draining, afebrile. FS uncontrolled, Endocrinology following up. She is agreeable to discharge with Urology follow-up as outpatient, finished ATB course.     General: AOX3, NAD, lying in bed, speaking in full sentences, no labored breathing on RA  HEENT: AT/NC, no facial asymmetry  Lungs: no crackles, no wheezes  Heart: RRR  Abdomen: colostomy in place, catheter in place, no erythema, no swelling, soft abdomen, + BS  Extremities: warm, no edema, no calf tenderness, no focal deficit     Labs, imaging reviewed    Patient s/p UTI treatment with Gentamycin, no indication for catheter exhange per IR. Urology as outpatient for TOV  Appreciate Endocrinology   PCP follow-up as outpatient  Continue AC

## 2023-05-03 NOTE — PROGRESS NOTE ADULT - PROBLEM SELECTOR PROBLEM 4
History of creation of ostomy

## 2023-05-03 NOTE — PROGRESS NOTE ADULT - SUBJECTIVE AND OBJECTIVE BOX
**Incomplete Note**   CC: Patient is a 43y old  Female who presents with a chief complaint of severe sepsis (03 May 2023 07:10)      INTERVAL EVENTS: FAUSTINO    SUBJECTIVE / INTERVAL HPI: Patient seen and examined at bedside. States that     ROS: negative unless otherwise stated above.    VITAL SIGNS:  Vital Signs Last 24 Hrs  T(C): 37.2 (03 May 2023 06:04), Max: 37.2 (03 May 2023 06:04)  T(F): 98.9 (03 May 2023 06:04), Max: 98.9 (03 May 2023 06:04)  HR: 101 (03 May 2023 09:05) (93 - 101)  BP: 133/84 (03 May 2023 09:05) (115/84 - 133/84)  BP(mean): --  RR: 18 (03 May 2023 09:05) (17 - 18)  SpO2: 98% (03 May 2023 09:05) (98% - 100%)    Parameters below as of 03 May 2023 09:05  Patient On (Oxygen Delivery Method): room air          05-02-23 @ 07:01  -  05-03-23 @ 07:00  --------------------------------------------------------  IN: 0 mL / OUT: 300 mL / NET: -300 mL        PHYSICAL EXAM:    General: NAD  HEENT: MMM  Neck: supple  Cardiovascular: +S1/S2; RRR  Respiratory: CTA B/L; no W/R/R  Gastrointestinal: soft, NT/ND  Extremities: WWP; no edema, clubbing or cyanosis  Vascular: 2+ radial, DP/PT pulses B/L  Neurological: AAOx3; no focal deficits    MEDICATIONS:  MEDICATIONS  (STANDING):  apixaban 5 milliGRAM(s) Oral every 12 hours  dextrose 5%. 1000 milliLiter(s) (100 mL/Hr) IV Continuous <Continuous>  dextrose 5%. 1000 milliLiter(s) (50 mL/Hr) IV Continuous <Continuous>  dextrose 50% Injectable 25 Gram(s) IV Push once  dextrose 50% Injectable 12.5 Gram(s) IV Push once  dextrose 50% Injectable 25 Gram(s) IV Push once  glucagon  Injectable 1 milliGRAM(s) IntraMuscular once  insulin glargine Injectable (LANTUS) 30 Unit(s) SubCutaneous once  insulin glargine Injectable (LANTUS) 35 Unit(s) SubCutaneous at bedtime  insulin lispro (ADMELOG) corrective regimen sliding scale   SubCutaneous Before meals and at bedtime  insulin lispro Injectable (ADMELOG) 20 Unit(s) SubCutaneous three times a day before meals  labetalol 100 milliGRAM(s) Oral every 24 hours  polyethylene glycol 3350 17 Gram(s) Oral daily  senna 2 Tablet(s) Oral at bedtime    MEDICATIONS  (PRN):  acetaminophen     Tablet .. 650 milliGRAM(s) Oral every 6 hours PRN Temp greater or equal to 38C (100.4F), Mild Pain (1 - 3)  albuterol    90 MICROgram(s) HFA Inhaler 1 Puff(s) Inhalation daily PRN Shortness of Breath and/or Wheezing  aluminum hydroxide/magnesium hydroxide/simethicone Suspension 30 milliLiter(s) Oral every 4 hours PRN Dyspepsia  dextrose Oral Gel 15 Gram(s) Oral once PRN Blood Glucose LESS THAN 70 milliGRAM(s)/deciliter  HYDROmorphone   Tablet 2 milliGRAM(s) Oral every 8 hours PRN Moderate Pain (4 - 6)  melatonin 3 milliGRAM(s) Oral at bedtime PRN Insomnia  ondansetron Injectable 4 milliGRAM(s) IV Push every 8 hours PRN Nausea and/or Vomiting  oxyCODONE    IR 10 milliGRAM(s) Oral every 6 hours PRN Severe Pain (7 - 10)      ALLERGIES:  Allergies    clindamycin (Pruritus)  fish (Hives; Urticaria)  amoxicillin (Unknown)  iodine containing compounds (Hives; Anaphylaxis)  shellfish. (Hives; Anaphylaxis)  penicillin (Hives)    Intolerances    Bactrim I.V. (Rash (Mod to Severe))      LABS:                        11.9   8.68  )-----------( 417      ( 02 May 2023 05:30 )             40.6     05-02    135  |  98  |  16  ----------------------------<  428<H>  4.6   |  26  |  0.83    Ca    8.7      02 May 2023 05:30  Phos  3.8     05-02  Mg     2.1     05-02          CAPILLARY BLOOD GLUCOSE      POCT Blood Glucose.: 419 mg/dL (03 May 2023 09:01)      RADIOLOGY & ADDITIONAL TESTS: Reviewed.   CC: Patient is a 43y old  Female who presents with a chief complaint of severe sepsis (03 May 2023 07:10)      INTERVAL EVENTS: FAUSTINO    SUBJECTIVE / INTERVAL HPI: Patient seen and examined at bedside. Reports abdominal pain in lower abdomen and HA in front of head and top of head, denies radiation of pain. Reports shooting pain in the legs as well as nausea. Denies fever, chills, chest pain, numbness and tingling in the hands and feet.    ROS: negative unless otherwise stated above.    VITAL SIGNS:  Vital Signs Last 24 Hrs  T(C): 37.2 (03 May 2023 06:04), Max: 37.2 (03 May 2023 06:04)  T(F): 98.9 (03 May 2023 06:04), Max: 98.9 (03 May 2023 06:04)  HR: 101 (03 May 2023 09:05) (93 - 101)  BP: 133/84 (03 May 2023 09:05) (115/84 - 133/84)  BP(mean): --  RR: 18 (03 May 2023 09:05) (17 - 18)  SpO2: 98% (03 May 2023 09:05) (98% - 100%)    Parameters below as of 03 May 2023 09:05  Patient On (Oxygen Delivery Method): room air          05-02-23 @ 07:01  -  05-03-23 @ 07:00  --------------------------------------------------------  IN: 0 mL / OUT: 300 mL / NET: -300 mL        PHYSICAL EXAM:    General: NAD  HEENT: MMM  Neck: supple  Cardiovascular: +S1/S2; RRR  Respiratory: CTA B/L; no W/R/R  Gastrointestinal: soft, NT/ND; ostomy bag in place with surrounding skin appearing healthy  : suprapubic catheter with bag draining clear yellow urine  Extremities: WWP; no edema, clubbing or cyanosis  Vascular: 2+ radial, DP/PT pulses B/L  Neurological: AAOx3; no focal deficits    MEDICATIONS:  MEDICATIONS  (STANDING):  apixaban 5 milliGRAM(s) Oral every 12 hours  dextrose 5%. 1000 milliLiter(s) (100 mL/Hr) IV Continuous <Continuous>  dextrose 5%. 1000 milliLiter(s) (50 mL/Hr) IV Continuous <Continuous>  dextrose 50% Injectable 25 Gram(s) IV Push once  dextrose 50% Injectable 12.5 Gram(s) IV Push once  dextrose 50% Injectable 25 Gram(s) IV Push once  glucagon  Injectable 1 milliGRAM(s) IntraMuscular once  insulin glargine Injectable (LANTUS) 30 Unit(s) SubCutaneous once  insulin glargine Injectable (LANTUS) 35 Unit(s) SubCutaneous at bedtime  insulin lispro (ADMELOG) corrective regimen sliding scale   SubCutaneous Before meals and at bedtime  insulin lispro Injectable (ADMELOG) 20 Unit(s) SubCutaneous three times a day before meals  labetalol 100 milliGRAM(s) Oral every 24 hours  polyethylene glycol 3350 17 Gram(s) Oral daily  senna 2 Tablet(s) Oral at bedtime    MEDICATIONS  (PRN):  acetaminophen     Tablet .. 650 milliGRAM(s) Oral every 6 hours PRN Temp greater or equal to 38C (100.4F), Mild Pain (1 - 3)  albuterol    90 MICROgram(s) HFA Inhaler 1 Puff(s) Inhalation daily PRN Shortness of Breath and/or Wheezing  aluminum hydroxide/magnesium hydroxide/simethicone Suspension 30 milliLiter(s) Oral every 4 hours PRN Dyspepsia  dextrose Oral Gel 15 Gram(s) Oral once PRN Blood Glucose LESS THAN 70 milliGRAM(s)/deciliter  HYDROmorphone   Tablet 2 milliGRAM(s) Oral every 8 hours PRN Moderate Pain (4 - 6)  melatonin 3 milliGRAM(s) Oral at bedtime PRN Insomnia  ondansetron Injectable 4 milliGRAM(s) IV Push every 8 hours PRN Nausea and/or Vomiting  oxyCODONE    IR 10 milliGRAM(s) Oral every 6 hours PRN Severe Pain (7 - 10)      ALLERGIES:  Allergies    clindamycin (Pruritus)  fish (Hives; Urticaria)  amoxicillin (Unknown)  iodine containing compounds (Hives; Anaphylaxis)  shellfish. (Hives; Anaphylaxis)  penicillin (Hives)    Intolerances    Bactrim I.V. (Rash (Mod to Severe))      LABS:                        11.9   8.68  )-----------( 417      ( 02 May 2023 05:30 )             40.6     05-02    135  |  98  |  16  ----------------------------<  428<H>  4.6   |  26  |  0.83    Ca    8.7      02 May 2023 05:30  Phos  3.8     05-02  Mg     2.1     05-02          CAPILLARY BLOOD GLUCOSE      POCT Blood Glucose.: 419 mg/dL (03 May 2023 09:01)      RADIOLOGY & ADDITIONAL TESTS: Reviewed.

## 2023-05-03 NOTE — PROGRESS NOTE ADULT - PROBLEM SELECTOR PLAN 2
A1c of 12.5% on previous admission. Previously followed by endo while admitted. O/p endocrinologist is Dr. Joseph [phone]. Per most updated notes, patient to be maintained on humilin U500 160U TID (could use admelog 35units if PP FSG >200). Euglycemic on admission glu 130. Hypoglycemic this AM with FSG 70-90.   - Hold home Humulin  - Lantus and premeal insulin regimen per endo recs  - c/w mISS   - FS QID with meals and at bedtime + 2h post prandial  - Endo consulted; appreciate recs A1c of 12.5% on previous admission. Previously followed by endo while admitted. O/p endocrinologist is Dr. Joseph [phone]. Per most updated notes, patient to be maintained on humilin U500 160U TID (could use admelog 35units if PP FSG >200). Euglycemic on admission glu 130. Hypoglycemic this AM with FSG 70-90.   - Hold home Humulin  - Lantus and premeal insulin regimen per endo recs  - c/w mISS   - FS QID with meals and at bedtime + 2h post prandial  - Endo consulted; appreciate recs, will plan to discharge on home humulin U-500 100 units TID  - f/u with Dr. Joseph at [facility]

## 2023-05-03 NOTE — PROGRESS NOTE ADULT - PROBLEM SELECTOR PLAN 3
Bilateral lower lobe pulmonary emboli in segmental and subsegmental branches on the right and subsegmental branches on the left, diagnosed on CT chest during ED visit on 4/7. Was discharged with Eliquis 5mg 2 tablets BID, as confirmed by patient and her pharmacy.  - Will change to Eliquis 5mg BID and discharge pt with this dose Bilateral lower lobe pulmonary emboli in segmental and subsegmental branches on the right and subsegmental branches on the left, diagnosed on CT chest during ED visit on 4/7. Was discharged with Eliquis 5mg 2 tablets BID, as confirmed by patient and her pharmacy.  - changed to Eliquis 5mg BID and discharge pt with this dose

## 2023-05-03 NOTE — PROGRESS NOTE ADULT - REASON FOR ADMISSION
severe sepsis

## 2023-05-03 NOTE — PROGRESS NOTE ADULT - PROVIDER SPECIALTY LIST ADULT
Infectious Disease
Infectious Disease
Internal Medicine
Endocrinology
Infectious Disease
Endocrinology
Endocrinology
Internal Medicine
Internal Medicine
Hospitalist
Internal Medicine

## 2023-05-03 NOTE — PROGRESS NOTE ADULT - PROBLEM SELECTOR PLAN 1
i/s/o of Urinary tract infection, SIRS 2/4 for tachycardia and WBC +lactic acidosis & suspected source of infection. Most recent Culture data reveals negative BCx with positive UCx on 4/4/23 which showed 2 various strains of K. pneumoniae (one ESBL and 1 carbapenem resistant), At that time the patient was seen in the ED (culture obtained via suprapubic catheter), and subsequently discharged home. When culture speciated was contacted and asked ot report to ED for IV ABx however patient never did. Unclear if this culture data represents chronic colonization v acute sepsis 2/2 UTI.   - ID consulted; appreciate recs  - c/w gentamicin 500mg IVPB q24h x5 day total course  - Urine culture likely contaminated due to being collected from drainage bag, according to ID  - Blood cultures NGTD x3 days  - IR consulted to switch out suprapubic catheter and repeat UCX  - Will obtain CT abdomen/pelvis to visualize suprapubic cath i/s/o of Urinary tract infection, SIRS 2/4 for tachycardia and WBC +lactic acidosis & suspected source of infection. Most recent Culture data reveals negative BCx with positive UCx on 4/4/23 which showed 2 various strains of K. pneumoniae (one ESBL and 1 carbapenem resistant), At that time the patient was seen in the ED (culture obtained via suprapubic catheter), and subsequently discharged home. When culture speciated was contacted and asked ot report to ED for IV ABx however patient never did. Unclear if this culture data represents chronic colonization v acute sepsis 2/2 UTI.   - ID consulted; appreciate recs  - s/p gentamicin 500mg IVPB q24h x5 day total course  - Urine culture likely contaminated due to being collected from drainage bag, according to ID  - Blood cultures NGTD x3 days  - CT abdomen/pelvis shows tiny nonobstructing 2mm renal calculi, suprapubic catheter in correct positioning  - IR consulted to switch out suprapubic catheter, recommend keeping catheter in place given CT imaging shows proper positioning

## 2023-05-03 NOTE — DISCHARGE NOTE NURSING/CASE MANAGEMENT/SOCIAL WORK - NSDCVIVACCINE_GEN_ALL_CORE_FT
Tdap; 25-Apr-2019 12:41; Neena Chavez (ARMANDO); [employer]; [id number] (Exp. Date: 02-Nov-2020); IntraMuscular; Deltoid Right.; 0.5 milliLiter(s); VIS (VIS Published: 09-May-2013, VIS Presented: 25-Apr-2019);

## 2023-05-03 NOTE — DISCHARGE NOTE NURSING/CASE MANAGEMENT/SOCIAL WORK - NSDCFUADDAPPT_GEN_ALL_CORE_FT
Please bring your Insurance card, Photo ID and Discharge paperwork to the following appointment:    (1) Please follow up with Dr. Fuller (Urologist) on Wednesday 5/17 at 1 pm at [address], [address].    (2) Please follow up with Dr. Joseph (Endocrinologist) at [facility] by calling [phone] to schedule an appointment.    (3) Please follow up with orthopedic surgery at [address], [place] [address] to get fitted for an ankle foot orthosis (AFO), as recommended by physical therapy. Call [phone] to make an appointment with any orthopedist.

## 2023-05-03 NOTE — PROGRESS NOTE ADULT - ASSESSMENT
43F PMH DM, HTN, HLD, Abdominal Necrotizing fascitis (s/p suprapubic catheter and ostomy bag placement on 11/11/21 at [facility]), p/w several complaints. Pt states that she has several weeks of increasing pain to suprapubic catheter site as well as lower abdomen. Admitted for severe sepsis.  43F PMH DM, HTN, HLD, Abdominal Necrotizing fascitis (s/p suprapubic catheter and ostomy bag placement on 11/11/21 at [facility]), p/w several complaints. Pt states that she has several weeks of increasing pain to suprapubic catheter site as well as lower abdomen. Admitted for severe sepsis.

## 2023-05-03 NOTE — PROGRESS NOTE ADULT - PROBLEM SELECTOR PLAN 6
- f/u Nutrition consult

## 2023-05-03 NOTE — PROGRESS NOTE ADULT - PROBLEM SELECTOR PLAN 7
F: none  E: replete prn  N: CC no snacks, DASH/TLC  DVT ppx: Eliquis  Dispo: [facility]IDAN

## 2023-05-03 NOTE — CHART NOTE - NSCHARTNOTEFT_GEN_A_CORE
Ms. Katz is a 43-year-old female with past medical history of type 2 diabetes mellitus, hypertension, hyperlipidemia, abdominal necrotizing fascitis (s/p suprapubic catheter and ostomy bag placement on 11/11/21 at [facility]) who presented complaining of increasing pain to suprapubic catheter site as well as lower abdomen. She was admitted for management of severe sepsis secondary to UTI. Endocrinology has been following for recommendations regarding diabetes management.    287 mg/dL (05-02 @ 12:57) ? 30 units of lantus + 20 units of lispro + 6 units of lispro sliding scale.   194 mg/dL (05-02 @ 17:28) ? 20 units of lispro + 2 units of lispro sliding scale.   207 mg/dL (05-02 @ 22:21) ? 35 units of lantus + 4 units of lispro sliding scale.   419 mg/dL (05-03 @ 09:01) ? 20 units of lispro + 12 units of lispro sliding scale.     A1C: 12.2 %  BUN: 16  Creatinine: 0.83  GFR: 90  Weight: 100.2  BMI: 34.6    # Type 2 diabetes mellitus  - She is planned for discharge later today.  - Upon discharge, she may go back to his home regimen of humulin U-500 100 units three times daily.   - Patient can follow up at discharge with Dr. Joseph at [facility] by calling  to make an appointment.       Case discussed with Dr. Bowden. Primary team updated.     Gerald Qiu  Endocrinology Fellow Ms. Katz is a 43-year-old female with past medical history of type 2 diabetes mellitus, hypertension, hyperlipidemia, abdominal necrotizing fascitis (s/p suprapubic catheter and ostomy bag placement on 11/11/21 at [facility]) who presented complaining of increasing pain to suprapubic catheter site as well as lower abdomen. She was admitted for management of severe sepsis secondary to UTI. Endocrinology has been following for recommendations regarding diabetes management.    287 mg/dL (05-02 @ 12:57) ? 30 units of lantus + 20 units of lispro + 6 units of lispro sliding scale.   194 mg/dL (05-02 @ 17:28) ? 20 units of lispro + 2 units of lispro sliding scale.   207 mg/dL (05-02 @ 22:21) ? 35 units of lantus + 4 units of lispro sliding scale.   419 mg/dL (05-03 @ 09:01) ? 20 units of lispro + 12 units of lispro sliding scale.     A1C: 12.2 %  BUN: 16  Creatinine: 0.83  GFR: 90  Weight: 100.2  BMI: 34.6    # Type 2 diabetes mellitus  - She is planned for discharge later today.  - Upon discharge, she may go back to his home regimen of humulin U-500 100 units three times daily.   - Patient can follow up at discharge with Dr. Joseph at [facility] by calling  to make an appointment.       Case discussed with Dr. Bowden. Primary team updated.     Gerald Qiu  Endocrinology Fellow    Attending:  Above discussed with Dr. Cherry prior to pt's discharge.  Pt was nonetheless advised to watch for hypoglycemia on the U-500, which may have been occurring prior to admission    MARIBEL Bowden M.D.

## 2023-05-03 NOTE — DISCHARGE NOTE NURSING/CASE MANAGEMENT/SOCIAL WORK - NSDCPEFALRISK_GEN_ALL_CORE
For information on Fall & Injury Prevention, visit: [email or web address] OR  [email or web address] OR  https://www.cdc.gov/steadi/patient.html

## 2023-05-03 NOTE — PROGRESS NOTE ADULT - PROBLEM SELECTOR PROBLEM 3
Bilateral pulmonary embolism

## 2023-05-03 NOTE — PROGRESS NOTE ADULT - PROBLEM SELECTOR PLAN 5
Home Labetalol 100mg qd and Losartan 50mg qd  - Holding i/s/o sepsis for now, resume when appropriate Home Labetalol 100mg qd and Losartan 50mg qd. Initially held antihypertensives due to sepsis.  - Resumed labetalol 100mg qd  - Will resume losartan 50mg qd upon discharge

## 2023-05-06 DIAGNOSIS — E83.42 HYPOMAGNESEMIA: ICD-10-CM

## 2023-05-06 DIAGNOSIS — I10 ESSENTIAL (PRIMARY) HYPERTENSION: ICD-10-CM

## 2023-05-06 DIAGNOSIS — N39.0 URINARY TRACT INFECTION, SITE NOT SPECIFIED: ICD-10-CM

## 2023-05-06 DIAGNOSIS — T83.510A INFECTION AND INFLAMMATORY REACTION DUE TO CYSTOSTOMY CATHETER, INITIAL ENCOUNTER: ICD-10-CM

## 2023-05-06 DIAGNOSIS — Z91.013 ALLERGY TO SEAFOOD: ICD-10-CM

## 2023-05-06 DIAGNOSIS — Z91.041 RADIOGRAPHIC DYE ALLERGY STATUS: ICD-10-CM

## 2023-05-06 DIAGNOSIS — R65.20 SEVERE SEPSIS WITHOUT SEPTIC SHOCK: ICD-10-CM

## 2023-05-06 DIAGNOSIS — Z86.711 PERSONAL HISTORY OF PULMONARY EMBOLISM: ICD-10-CM

## 2023-05-06 DIAGNOSIS — Z88.0 ALLERGY STATUS TO PENICILLIN: ICD-10-CM

## 2023-05-06 DIAGNOSIS — E66.9 OBESITY, UNSPECIFIED: ICD-10-CM

## 2023-05-06 DIAGNOSIS — A41.9 SEPSIS, UNSPECIFIED ORGANISM: ICD-10-CM

## 2023-05-06 DIAGNOSIS — Z88.1 ALLERGY STATUS TO OTHER ANTIBIOTIC AGENTS STATUS: ICD-10-CM

## 2023-05-06 DIAGNOSIS — E11.65 TYPE 2 DIABETES MELLITUS WITH HYPERGLYCEMIA: ICD-10-CM

## 2023-05-06 DIAGNOSIS — Z93.3 COLOSTOMY STATUS: ICD-10-CM

## 2023-05-06 DIAGNOSIS — E87.20 ACIDOSIS, UNSPECIFIED: ICD-10-CM

## 2023-05-06 DIAGNOSIS — Z79.4 LONG TERM (CURRENT) USE OF INSULIN: ICD-10-CM

## 2023-05-06 DIAGNOSIS — Z79.01 LONG TERM (CURRENT) USE OF ANTICOAGULANTS: ICD-10-CM

## 2023-05-17 ENCOUNTER — APPOINTMENT (OUTPATIENT)
Dept: UROLOGY | Facility: CLINIC | Age: 43
End: 2023-05-17

## 2023-05-19 ENCOUNTER — APPOINTMENT (OUTPATIENT)
Dept: UROLOGY | Facility: CLINIC | Age: 43
End: 2023-05-19

## 2023-05-22 VITALS
TEMPERATURE: 99 F | OXYGEN SATURATION: 98 % | SYSTOLIC BLOOD PRESSURE: 158 MMHG | HEART RATE: 133 BPM | RESPIRATION RATE: 18 BRPM | WEIGHT: 231.93 LBS | HEIGHT: 67 IN | DIASTOLIC BLOOD PRESSURE: 99 MMHG

## 2023-05-22 PROCEDURE — 99291 CRITICAL CARE FIRST HOUR: CPT

## 2023-05-22 RX ORDER — MORPHINE SULFATE 50 MG/1
4 CAPSULE, EXTENDED RELEASE ORAL ONCE
Refills: 0 | Status: DISCONTINUED | OUTPATIENT
Start: 2023-05-22 | End: 2023-05-22

## 2023-05-22 RX ORDER — VANCOMYCIN HCL 1 G
1000 VIAL (EA) INTRAVENOUS ONCE
Refills: 0 | Status: COMPLETED | OUTPATIENT
Start: 2023-05-22 | End: 2023-05-22

## 2023-05-22 RX ORDER — DIPHENHYDRAMINE HCL 50 MG
50 CAPSULE ORAL ONCE
Refills: 0 | Status: COMPLETED | OUTPATIENT
Start: 2023-05-22 | End: 2023-05-22

## 2023-05-22 RX ORDER — ONDANSETRON 8 MG/1
4 TABLET, FILM COATED ORAL ONCE
Refills: 0 | Status: COMPLETED | OUTPATIENT
Start: 2023-05-22 | End: 2023-05-22

## 2023-05-22 RX ORDER — SODIUM CHLORIDE 9 MG/ML
1000 INJECTION INTRAMUSCULAR; INTRAVENOUS; SUBCUTANEOUS ONCE
Refills: 0 | Status: COMPLETED | OUTPATIENT
Start: 2023-05-22 | End: 2023-05-22

## 2023-05-22 RX ORDER — GENTAMICIN SULFATE 40 MG/ML
500 VIAL (ML) INJECTION ONCE
Refills: 0 | Status: COMPLETED | OUTPATIENT
Start: 2023-05-22 | End: 2023-05-22

## 2023-05-22 NOTE — ED ADULT NURSE NOTE - PRIVACY CONCERNS
----- Message from Bessy Rodriguez sent at 7/19/2017  1:03 PM CDT -----  Regarding: Hydro Blue Classic - Anti bacterial foam dressing.  Contact: [phone]  Pt called and said she's completely out of Hydro Blue Classic - Anti bacterial foam dressing. I don't see it in her chart... Pt says Dr. Lewis prescribed it for her. Pt can be reached at [phone].    Thank you,  Bessy BERTRAND         Message left for patient to keep wound covered with dry sterile 4x4 until he sees her this Friday in clinic.    Unable to reach patient at above number.  Number changed to [phone]  
No

## 2023-05-22 NOTE — ED ADULT TRIAGE NOTE - NS ED TRIAGE CLINICAL UPGRADE
no/never have had a blood transfusion Deteriorating patient status - Patient was clinically upgraded due to deteriorating patient status.

## 2023-05-22 NOTE — ED ADULT NURSE REASSESSMENT NOTE - NSFALLRISKINTERV_ED_ALL_ED

## 2023-05-22 NOTE — ED ADULT NURSE NOTE - NS ED TRIAGE CLINICAL UPGRADE PROVIDER FT
Ongoing SW/CM Assessment/Plan of Care Note     See SW/CM flowsheets for goals and other objective data.    Patient/Family discharge goal (s):  Goal #1: Psychosocial needs assessed  Goal #2: Establish present or future health care decison-maker  Goal #3: Education/provision of information (community resources)    PT Recommendation:  Recommendation for Discharge: PT WI: Sub-acute nursing home (pending progress)       OT Recommendation:  Recommendations for Discharge: OT WI: Sub-acute nursing home       SLP Recommendation:       Disposition:       Progress note:   Peer Coverage:  SW is continuing to follow for discharge planning needs.  Writer reviewed medical record.  Aware that pt is scheduled for surgical bypass on right leg today.  Plan is for staged peripheral angiogram next week to address stenosis in left lower extremity. Aware that therapies are recommending subacute rehab placement at this time. Pt is not ready for active discharge planning given above ongoing issues.  SW will continue to follow.         Markos

## 2023-05-22 NOTE — ED ADULT NURSE NOTE - OBJECTIVE STATEMENT
Pt presents with c/o "abdominal pain", chronic hx of same with multiple ER visits and admissions. Today pt states "I noticed the old supra pubic catheter sticking out of the hole, and the other catheter has pus and bloody drainage coming from around the hole". Pt presents tachycardic and febrile to triage, stating the newly reported s/s started today. States the pain is located to BLQ abdomen and into vagina. Pt also states bloody drainage noted "coming from belly button".

## 2023-05-22 NOTE — ED ADULT NURSE NOTE - NSFALLUNIVINTERV_ED_ALL_ED
Bed/Stretcher in lowest position, wheels locked, appropriate side rails in place/Call bell, personal items and telephone in reach/Instruct patient to call for assistance before getting out of bed/chair/stretcher/Non-slip footwear applied when patient is off stretcher/[place] to call system/Physically safe environment - no spills, clutter or unnecessary equipment/Purposeful proactive rounding/Room/bathroom lighting operational, light cord in reach

## 2023-05-22 NOTE — ED ADULT TRIAGE NOTE - ARRIVAL INFO ADDITIONAL COMMENTS
pt has hx of necrotizing fascitis in 2011 and since has had continuing abd issues.   has had many infections of her SPC.   today c/o abd pain with pus draining from SPC. pt has hx of necrotizing fascitis in 2021 and since has had continuing abd issues.   has had many infections of her SPC.   today c/o abd pain with pus draining from SPC.

## 2023-05-23 ENCOUNTER — TRANSCRIPTION ENCOUNTER (OUTPATIENT)
Age: 43
End: 2023-05-23

## 2023-05-23 ENCOUNTER — INPATIENT (INPATIENT)
Facility: HOSPITAL | Age: 43
LOS: 8 days | Discharge: HOME | DRG: 698 | End: 2023-06-01
Attending: INTERNAL MEDICINE | Admitting: STUDENT IN AN ORGANIZED HEALTH CARE EDUCATION/TRAINING PROGRAM
Payer: MEDICAID

## 2023-05-23 DIAGNOSIS — I10 ESSENTIAL (PRIMARY) HYPERTENSION: ICD-10-CM

## 2023-05-23 DIAGNOSIS — E11.9 TYPE 2 DIABETES MELLITUS WITHOUT COMPLICATIONS: ICD-10-CM

## 2023-05-23 DIAGNOSIS — A41.9 SEPSIS, UNSPECIFIED ORGANISM: ICD-10-CM

## 2023-05-23 DIAGNOSIS — I26.99 OTHER PULMONARY EMBOLISM WITHOUT ACUTE COR PULMONALE: ICD-10-CM

## 2023-05-23 DIAGNOSIS — Z98.890 OTHER SPECIFIED POSTPROCEDURAL STATES: Chronic | ICD-10-CM

## 2023-05-23 DIAGNOSIS — Z98.89 OTHER SPECIFIED POSTPROCEDURAL STATES: Chronic | ICD-10-CM

## 2023-05-23 DIAGNOSIS — J45.909 UNSPECIFIED ASTHMA, UNCOMPLICATED: ICD-10-CM

## 2023-05-23 DIAGNOSIS — N39.0 URINARY TRACT INFECTION, SITE NOT SPECIFIED: ICD-10-CM

## 2023-05-23 DIAGNOSIS — R63.8 OTHER SYMPTOMS AND SIGNS CONCERNING FOOD AND FLUID INTAKE: ICD-10-CM

## 2023-05-23 DIAGNOSIS — Z93.9 ARTIFICIAL OPENING STATUS, UNSPECIFIED: ICD-10-CM

## 2023-05-23 DIAGNOSIS — N83.201 UNSPECIFIED OVARIAN CYST, RIGHT SIDE: ICD-10-CM

## 2023-05-23 DIAGNOSIS — E87.6 HYPOKALEMIA: ICD-10-CM

## 2023-05-23 DIAGNOSIS — Z93.59 OTHER CYSTOSTOMY STATUS: ICD-10-CM

## 2023-05-23 LAB
ANION GAP SERPL CALC-SCNC: 10 MMOL/L — SIGNIFICANT CHANGE UP (ref 5–17)
APTT BLD: 27.9 SEC — SIGNIFICANT CHANGE UP (ref 27.5–35.5)
APTT BLD: 30.1 SEC — SIGNIFICANT CHANGE UP (ref 27.5–35.5)
BASOPHILS # BLD AUTO: 0.03 K/UL — SIGNIFICANT CHANGE UP (ref 0–0.2)
BASOPHILS NFR BLD AUTO: 0.3 % — SIGNIFICANT CHANGE UP (ref 0–2)
BUN SERPL-MCNC: 9 MG/DL — SIGNIFICANT CHANGE UP (ref 7–23)
CALCIUM SERPL-MCNC: 8.5 MG/DL — SIGNIFICANT CHANGE UP (ref 8.4–10.5)
CHLORIDE SERPL-SCNC: 102 MMOL/L — SIGNIFICANT CHANGE UP (ref 96–108)
CO2 SERPL-SCNC: 25 MMOL/L — SIGNIFICANT CHANGE UP (ref 22–31)
CREAT SERPL-MCNC: 0.67 MG/DL — SIGNIFICANT CHANGE UP (ref 0.5–1.3)
EGFR: 111 ML/MIN/1.73M2 — SIGNIFICANT CHANGE UP
EOSINOPHIL # BLD AUTO: 0.19 K/UL — SIGNIFICANT CHANGE UP (ref 0–0.5)
EOSINOPHIL NFR BLD AUTO: 1.7 % — SIGNIFICANT CHANGE UP (ref 0–6)
GLUCOSE BLDC GLUCOMTR-MCNC: 158 MG/DL — HIGH (ref 70–99)
GLUCOSE BLDC GLUCOMTR-MCNC: 208 MG/DL — HIGH (ref 70–99)
GLUCOSE BLDC GLUCOMTR-MCNC: 263 MG/DL — HIGH (ref 70–99)
GLUCOSE BLDC GLUCOMTR-MCNC: 272 MG/DL — HIGH (ref 70–99)
GLUCOSE SERPL-MCNC: 258 MG/DL — HIGH (ref 70–99)
HCT VFR BLD CALC: 36.1 % — SIGNIFICANT CHANGE UP (ref 34.5–45)
HGB BLD-MCNC: 11.1 G/DL — LOW (ref 11.5–15.5)
IMM GRANULOCYTES NFR BLD AUTO: 0.4 % — SIGNIFICANT CHANGE UP (ref 0–0.9)
INR BLD: 1.07 — SIGNIFICANT CHANGE UP (ref 0.88–1.16)
LACTATE SERPL-SCNC: 1.7 MMOL/L — SIGNIFICANT CHANGE UP (ref 0.5–2)
LACTATE SERPL-SCNC: 2.3 MMOL/L — HIGH (ref 0.5–2)
LYMPHOCYTES # BLD AUTO: 2.35 K/UL — SIGNIFICANT CHANGE UP (ref 1–3.3)
LYMPHOCYTES # BLD AUTO: 21.2 % — SIGNIFICANT CHANGE UP (ref 13–44)
MAGNESIUM SERPL-MCNC: 1.5 MG/DL — LOW (ref 1.6–2.6)
MCHC RBC-ENTMCNC: 24 PG — LOW (ref 27–34)
MCHC RBC-ENTMCNC: 30.7 GM/DL — LOW (ref 32–36)
MCV RBC AUTO: 78 FL — LOW (ref 80–100)
MONOCYTES # BLD AUTO: 0.56 K/UL — SIGNIFICANT CHANGE UP (ref 0–0.9)
MONOCYTES NFR BLD AUTO: 5.1 % — SIGNIFICANT CHANGE UP (ref 2–14)
NEUTROPHILS # BLD AUTO: 7.91 K/UL — HIGH (ref 1.8–7.4)
NEUTROPHILS NFR BLD AUTO: 71.3 % — SIGNIFICANT CHANGE UP (ref 43–77)
NRBC # BLD: 0 /100 WBCS — SIGNIFICANT CHANGE UP (ref 0–0)
PHOSPHATE SERPL-MCNC: 3.5 MG/DL — SIGNIFICANT CHANGE UP (ref 2.5–4.5)
PLATELET # BLD AUTO: 361 K/UL — SIGNIFICANT CHANGE UP (ref 150–400)
POTASSIUM SERPL-MCNC: 3.1 MMOL/L — LOW (ref 3.5–5.3)
POTASSIUM SERPL-MCNC: 3.5 MMOL/L — SIGNIFICANT CHANGE UP (ref 3.5–5.3)
POTASSIUM SERPL-SCNC: 3.1 MMOL/L — LOW (ref 3.5–5.3)
POTASSIUM SERPL-SCNC: 3.5 MMOL/L — SIGNIFICANT CHANGE UP (ref 3.5–5.3)
PROTHROM AB SERPL-ACNC: 12.8 SEC — SIGNIFICANT CHANGE UP (ref 10.5–13.4)
RBC # BLD: 4.63 M/UL — SIGNIFICANT CHANGE UP (ref 3.8–5.2)
RBC # FLD: 15.7 % — HIGH (ref 10.3–14.5)
SODIUM SERPL-SCNC: 137 MMOL/L — SIGNIFICANT CHANGE UP (ref 135–145)
WBC # BLD: 11.08 K/UL — HIGH (ref 3.8–10.5)
WBC # FLD AUTO: 11.08 K/UL — HIGH (ref 3.8–10.5)

## 2023-05-23 PROCEDURE — 99223 1ST HOSP IP/OBS HIGH 75: CPT | Mod: GC

## 2023-05-23 PROCEDURE — 74177 CT ABD & PELVIS W/CONTRAST: CPT | Mod: 26,MA

## 2023-05-23 PROCEDURE — 99222 1ST HOSP IP/OBS MODERATE 55: CPT

## 2023-05-23 PROCEDURE — 76856 US EXAM PELVIC COMPLETE: CPT | Mod: 26

## 2023-05-23 RX ORDER — INSULIN LISPRO 100/ML
VIAL (ML) SUBCUTANEOUS
Refills: 0 | Status: DISCONTINUED | OUTPATIENT
Start: 2023-05-23 | End: 2023-06-01

## 2023-05-23 RX ORDER — HYDROMORPHONE HYDROCHLORIDE 2 MG/ML
2 INJECTION INTRAMUSCULAR; INTRAVENOUS; SUBCUTANEOUS ONCE
Refills: 0 | Status: DISCONTINUED | OUTPATIENT
Start: 2023-05-23 | End: 2023-05-23

## 2023-05-23 RX ORDER — POTASSIUM CHLORIDE 20 MEQ
10 PACKET (EA) ORAL ONCE
Refills: 0 | Status: COMPLETED | OUTPATIENT
Start: 2023-05-23 | End: 2023-05-23

## 2023-05-23 RX ORDER — DEXTROSE 50 % IN WATER 50 %
25 SYRINGE (ML) INTRAVENOUS ONCE
Refills: 0 | Status: DISCONTINUED | OUTPATIENT
Start: 2023-05-23 | End: 2023-06-01

## 2023-05-23 RX ORDER — MAGNESIUM SULFATE 500 MG/ML
2 VIAL (ML) INJECTION ONCE
Refills: 0 | Status: COMPLETED | OUTPATIENT
Start: 2023-05-23 | End: 2023-05-23

## 2023-05-23 RX ORDER — TRAMADOL HYDROCHLORIDE 50 MG/1
25 TABLET ORAL EVERY 6 HOURS
Refills: 0 | Status: DISCONTINUED | OUTPATIENT
Start: 2023-05-23 | End: 2023-05-24

## 2023-05-23 RX ORDER — ALBUTEROL 90 UG/1
1 AEROSOL, METERED ORAL DAILY
Refills: 0 | Status: DISCONTINUED | OUTPATIENT
Start: 2023-05-23 | End: 2023-06-01

## 2023-05-23 RX ORDER — SODIUM CHLORIDE 9 MG/ML
1000 INJECTION, SOLUTION INTRAVENOUS
Refills: 0 | Status: DISCONTINUED | OUTPATIENT
Start: 2023-05-23 | End: 2023-06-01

## 2023-05-23 RX ORDER — HEPARIN SODIUM 5000 [USP'U]/ML
INJECTION INTRAVENOUS; SUBCUTANEOUS
Qty: 25000 | Refills: 0 | Status: DISCONTINUED | OUTPATIENT
Start: 2023-05-23 | End: 2023-05-23

## 2023-05-23 RX ORDER — HEPARIN SODIUM 5000 [USP'U]/ML
1200 INJECTION INTRAVENOUS; SUBCUTANEOUS
Qty: 25000 | Refills: 0 | Status: DISCONTINUED | OUTPATIENT
Start: 2023-05-23 | End: 2023-05-24

## 2023-05-23 RX ORDER — ACETAMINOPHEN 500 MG
650 TABLET ORAL EVERY 6 HOURS
Refills: 0 | Status: DISCONTINUED | OUTPATIENT
Start: 2023-05-23 | End: 2023-05-24

## 2023-05-23 RX ORDER — MAGNESIUM SULFATE 500 MG/ML
2 VIAL (ML) INJECTION ONCE
Refills: 0 | Status: DISCONTINUED | OUTPATIENT
Start: 2023-05-23 | End: 2023-05-23

## 2023-05-23 RX ORDER — INSULIN GLARGINE 100 [IU]/ML
25 INJECTION, SOLUTION SUBCUTANEOUS AT BEDTIME
Refills: 0 | Status: DISCONTINUED | OUTPATIENT
Start: 2023-05-23 | End: 2023-05-24

## 2023-05-23 RX ORDER — HEPARIN SODIUM 5000 [USP'U]/ML
1000 INJECTION INTRAVENOUS; SUBCUTANEOUS
Qty: 25000 | Refills: 0 | Status: DISCONTINUED | OUTPATIENT
Start: 2023-05-23 | End: 2023-05-23

## 2023-05-23 RX ORDER — GENTAMICIN SULFATE 40 MG/ML
80 VIAL (ML) INJECTION EVERY 8 HOURS
Refills: 0 | Status: DISCONTINUED | OUTPATIENT
Start: 2023-05-23 | End: 2023-05-23

## 2023-05-23 RX ORDER — HYDROMORPHONE HYDROCHLORIDE 2 MG/ML
1 INJECTION INTRAMUSCULAR; INTRAVENOUS; SUBCUTANEOUS ONCE
Refills: 0 | Status: DISCONTINUED | OUTPATIENT
Start: 2023-05-23 | End: 2023-05-23

## 2023-05-23 RX ORDER — DEXTROSE 50 % IN WATER 50 %
12.5 SYRINGE (ML) INTRAVENOUS ONCE
Refills: 0 | Status: DISCONTINUED | OUTPATIENT
Start: 2023-05-23 | End: 2023-06-01

## 2023-05-23 RX ORDER — INSULIN GLARGINE 100 [IU]/ML
20 INJECTION, SOLUTION SUBCUTANEOUS EVERY MORNING
Refills: 0 | Status: DISCONTINUED | OUTPATIENT
Start: 2023-05-24 | End: 2023-05-24

## 2023-05-23 RX ORDER — MORPHINE SULFATE 50 MG/1
4 CAPSULE, EXTENDED RELEASE ORAL ONCE
Refills: 0 | Status: DISCONTINUED | OUTPATIENT
Start: 2023-05-23 | End: 2023-05-23

## 2023-05-23 RX ORDER — GLUCAGON INJECTION, SOLUTION 0.5 MG/.1ML
1 INJECTION, SOLUTION SUBCUTANEOUS ONCE
Refills: 0 | Status: DISCONTINUED | OUTPATIENT
Start: 2023-05-23 | End: 2023-06-01

## 2023-05-23 RX ORDER — SODIUM CHLORIDE 9 MG/ML
1000 INJECTION INTRAMUSCULAR; INTRAVENOUS; SUBCUTANEOUS ONCE
Refills: 0 | Status: COMPLETED | OUTPATIENT
Start: 2023-05-23 | End: 2023-05-23

## 2023-05-23 RX ORDER — POTASSIUM CHLORIDE 20 MEQ
10 PACKET (EA) ORAL
Refills: 0 | Status: COMPLETED | OUTPATIENT
Start: 2023-05-23 | End: 2023-05-23

## 2023-05-23 RX ORDER — POTASSIUM CHLORIDE 20 MEQ
40 PACKET (EA) ORAL ONCE
Refills: 0 | Status: COMPLETED | OUTPATIENT
Start: 2023-05-23 | End: 2023-05-23

## 2023-05-23 RX ORDER — DEXTROSE 50 % IN WATER 50 %
15 SYRINGE (ML) INTRAVENOUS ONCE
Refills: 0 | Status: DISCONTINUED | OUTPATIENT
Start: 2023-05-23 | End: 2023-06-01

## 2023-05-23 RX ORDER — KETOROLAC TROMETHAMINE 30 MG/ML
15 SYRINGE (ML) INJECTION ONCE
Refills: 0 | Status: DISCONTINUED | OUTPATIENT
Start: 2023-05-23 | End: 2023-05-23

## 2023-05-23 RX ORDER — INSULIN LISPRO 100/ML
VIAL (ML) SUBCUTANEOUS AT BEDTIME
Refills: 0 | Status: DISCONTINUED | OUTPATIENT
Start: 2023-05-23 | End: 2023-05-24

## 2023-05-23 RX ADMIN — HYDROMORPHONE HYDROCHLORIDE 2 MILLIGRAM(S): 2 INJECTION INTRAMUSCULAR; INTRAVENOUS; SUBCUTANEOUS at 16:47

## 2023-05-23 RX ADMIN — TRAMADOL HYDROCHLORIDE 25 MILLIGRAM(S): 50 TABLET ORAL at 18:45

## 2023-05-23 RX ADMIN — Medication 200 MILLIGRAM(S): at 01:56

## 2023-05-23 RX ADMIN — Medication 10 MILLIEQUIVALENT(S): at 02:55

## 2023-05-23 RX ADMIN — Medication 6: at 18:36

## 2023-05-23 RX ADMIN — HYDROMORPHONE HYDROCHLORIDE 1 MILLIGRAM(S): 2 INJECTION INTRAMUSCULAR; INTRAVENOUS; SUBCUTANEOUS at 06:34

## 2023-05-23 RX ADMIN — Medication 40 MILLIEQUIVALENT(S): at 08:36

## 2023-05-23 RX ADMIN — Medication 100 MILLIEQUIVALENT(S): at 04:05

## 2023-05-23 RX ADMIN — Medication 6: at 13:54

## 2023-05-23 RX ADMIN — HEPARIN SODIUM 1000 UNIT(S)/HR: 5000 INJECTION INTRAVENOUS; SUBCUTANEOUS at 13:54

## 2023-05-23 RX ADMIN — TRAMADOL HYDROCHLORIDE 25 MILLIGRAM(S): 50 TABLET ORAL at 19:45

## 2023-05-23 RX ADMIN — MORPHINE SULFATE 4 MILLIGRAM(S): 50 CAPSULE, EXTENDED RELEASE ORAL at 08:57

## 2023-05-23 RX ADMIN — MORPHINE SULFATE 4 MILLIGRAM(S): 50 CAPSULE, EXTENDED RELEASE ORAL at 00:45

## 2023-05-23 RX ADMIN — SODIUM CHLORIDE 1000 MILLILITER(S): 9 INJECTION INTRAMUSCULAR; INTRAVENOUS; SUBCUTANEOUS at 08:07

## 2023-05-23 RX ADMIN — HYDROMORPHONE HYDROCHLORIDE 2 MILLIGRAM(S): 2 INJECTION INTRAMUSCULAR; INTRAVENOUS; SUBCUTANEOUS at 15:47

## 2023-05-23 RX ADMIN — Medication 250 MILLIGRAM(S): at 00:59

## 2023-05-23 RX ADMIN — SODIUM CHLORIDE 1000 MILLILITER(S): 9 INJECTION INTRAMUSCULAR; INTRAVENOUS; SUBCUTANEOUS at 02:00

## 2023-05-23 RX ADMIN — Medication 15 MILLIGRAM(S): at 03:15

## 2023-05-23 RX ADMIN — Medication 500 MILLIGRAM(S): at 03:16

## 2023-05-23 RX ADMIN — INSULIN GLARGINE 25 UNIT(S): 100 INJECTION, SOLUTION SUBCUTANEOUS at 22:52

## 2023-05-23 RX ADMIN — Medication 15 MILLIGRAM(S): at 01:56

## 2023-05-23 RX ADMIN — MORPHINE SULFATE 4 MILLIGRAM(S): 50 CAPSULE, EXTENDED RELEASE ORAL at 00:06

## 2023-05-23 RX ADMIN — SODIUM CHLORIDE 1000 MILLILITER(S): 9 INJECTION INTRAMUSCULAR; INTRAVENOUS; SUBCUTANEOUS at 00:06

## 2023-05-23 RX ADMIN — ONDANSETRON 4 MILLIGRAM(S): 8 TABLET, FILM COATED ORAL at 00:07

## 2023-05-23 RX ADMIN — Medication 100 MILLIEQUIVALENT(S): at 08:37

## 2023-05-23 RX ADMIN — Medication 50 MILLIGRAM(S): at 00:07

## 2023-05-23 RX ADMIN — Medication 100 MILLIEQUIVALENT(S): at 01:45

## 2023-05-23 RX ADMIN — Medication 100 MILLIEQUIVALENT(S): at 02:55

## 2023-05-23 RX ADMIN — TRAMADOL HYDROCHLORIDE 25 MILLIGRAM(S): 50 TABLET ORAL at 23:50

## 2023-05-23 RX ADMIN — ALBUTEROL 1 PUFF(S): 90 AEROSOL, METERED ORAL at 13:54

## 2023-05-23 RX ADMIN — Medication 25 GRAM(S): at 13:53

## 2023-05-23 RX ADMIN — HYDROMORPHONE HYDROCHLORIDE 1 MILLIGRAM(S): 2 INJECTION INTRAMUSCULAR; INTRAVENOUS; SUBCUTANEOUS at 08:50

## 2023-05-23 NOTE — PATIENT PROFILE ADULT - NSPROMEDSBROUGHTTOHOSP_GEN_A_NUR
DR Glen BUSTAMANTE SERVED R/T DISCHARGE WITHOUT BETA BLOCKER. PATIENTS H.R HAS BEEN 50 LOW 60 S.  NO CALL WAS RETURNED no

## 2023-05-23 NOTE — PATIENT PROFILE ADULT - FUNCTIONAL ASSESSMENT - BASIC MOBILITY 6.
2-calculated by average/Not able to assess (calculate score using [facility] averaging method)

## 2023-05-23 NOTE — CONSULT NOTE ADULT - SUBJECTIVE AND OBJECTIVE BOX
INFECTIOUS DISEASES INITIAL CONSULT NOTE    HPI:  42 yo F with PMHx asthma, hx PE (Eliquis), DM, HTN, HLD, hx abdominal necrotizing fasciitis (s/p suprapubic catheter, ostomy placement in 2021) px to [facility] ED from home with worsening abd/suprapubic pain and drainage from prior suprapubic catheter insertion site with concern for infection.     Patient with recent admission at [facility] (-) during which time she was presenting with similar complaints of increased pain in the suprapubic catheter site admitted for severe sepsis with Klebsiella Pneumoniae treated with Gentamicin 500mg IVPB Q24 x5d. Suprapubic catheter not exchanged due to imaging showing adequate positioning.    On current admission, pt reports having last seen her PCP (Dr. Sácnhez) on  during which time he urged her to go seek medical attention due to concern for infection at the old suprapubic catheter site however pt was unable to do so due to childcare responsibilities. She is presenting to the ED now because of ongoing purulent drainage from the suprapubic catheter site and increased pain in the lower abd not relieved at home with use of OTC Tylenol. She denies subjective fevers/chills, chest pain, shortness of breath, diarrhea/increased ostomy output. She has noticed increased sediment in her urinary collection bag. No sick contacts or recent travel. No recent abx use aside from IV Gentamycin on prior admission. She reports her most distressing sx at this time is pain.     ED COURSE  VS: Tmax 99F (oral), , -130/80, RR 16, SpO2 97% (RA)   Labs: WBC 12, Plt 487, Lactate 5.3 -> 2.2 -> 2.3 -> 1.7, K 2.6, Bicarb 20, Glu 270, Alk Phos 155  EKG: Sinus tachycardia, ,   UA: Yellow, clear, Protein 30, Moderate blood, Small LE, Positive nitrites, >10 WBC  Imaging:  - CTAP with IV contrast: 4.5cm cystic structure in R ovary. No obstructive uropathy. Bladder decompressed by suprapubic catheter. Post-surgical changes in the bowel without evidence of bowel obstruction.   - US Pelvis: No evidence of ovarian torsion. 3.7cm simple-appearing right ovarian cyst.   Orders: Morphine 4mg IVP x1, Benadryl 50mg IVP x1, Gentamicin 500mg IV x1, Dilaudid 1mg IVP x1, Toradol 15mg IVP x1, Zofran 4mg IVP x1, Potassium Cl 10mEq IV x3, Potassium Cl 40mEq PO x1, 2L NS   Consults: GYN, Urology (23 May 2023 08:26)      PAST MEDICAL & SURGICAL HISTORY:  Essential hypertension      Hyperlipidemia      Diabetes      Obesity      Abdominal hernia      Pre-eclampsia      Pulmonary embolism      H/O LEEP      History of D&C      H/O:       H/O ventral hernia repair      H/O exploratory laparotomy            Review of Systems:   Constitutional, eyes, ENT, cardiovascular, respiratory, gastrointestinal, genitourinary, integumentary, neurological, psychiatric and heme/lymph are otherwise negative other than noted above       ANTIBIOTICS:  MEDICATIONS  (STANDING):  albuterol    90 MICROgram(s) HFA Inhaler 1 Puff(s) Inhalation daily  dextrose 5%. 1000 milliLiter(s) (100 mL/Hr) IV Continuous <Continuous>  dextrose 5%. 1000 milliLiter(s) (50 mL/Hr) IV Continuous <Continuous>  dextrose 50% Injectable 25 Gram(s) IV Push once  dextrose 50% Injectable 12.5 Gram(s) IV Push once  dextrose 50% Injectable 25 Gram(s) IV Push once  glucagon  Injectable 1 milliGRAM(s) IntraMuscular once  insulin glargine Injectable (LANTUS) 25 Unit(s) SubCutaneous at bedtime  insulin lispro (ADMELOG) corrective regimen sliding scale   SubCutaneous three times a day before meals  insulin lispro (ADMELOG) corrective regimen sliding scale   SubCutaneous at bedtime  magnesium sulfate  IVPB 2 Gram(s) IV Intermittent once    MEDICATIONS  (PRN):  acetaminophen     Tablet .. 650 milliGRAM(s) Oral every 6 hours PRN Temp greater or equal to 38C (100.4F), Mild Pain (1 - 3)  dextrose Oral Gel 15 Gram(s) Oral once PRN Blood Glucose LESS THAN 70 milliGRAM(s)/deciliter      Allergies    shellfish. (Hives; Anaphylaxis)  penicillin (Hives)  clindamycin (Pruritus)  amoxicillin (Unknown)  iodine containing compounds (Hives; Anaphylaxis)  fish (Hives; Urticaria)    Intolerances    Bactrim I.V. (Rash (Mod to Severe))      SOCIAL HISTORY:    FAMILY HISTORY:  FH: diabetes mellitus  father and mother    FH: hypertension  father and mother     no FH leading to current infection    Vital Signs Last 24 Hrs  T(C): 36.7 (23 May 2023 10:31), Max: 37.2 (22 May 2023 21:53)  T(F): 98.1 (23 May 2023 10:31), Max: 99 (22 May 2023 21:53)  HR: 101 (23 May 2023 10:31) (87 - 133)  BP: 170/90 (23 May 2023 10:31) (121/79 - 170/90)  BP(mean): --  RR: 19 (23 May 2023 10:31) (16 - 19)  SpO2: 98% (23 May 2023 10:31) (97% - 98%)    Parameters below as of 23 May 2023 10:31  Patient On (Oxygen Delivery Method): room air          PHYSICAL EXAM:  Constitutional: alert, NAD  Eyes: the sclera and conjunctiva were normal.   ENT: the ears and nose were normal in appearance.   Neck: the appearance of the neck was normal and the neck was supple.   Pulmonary: no respiratory distress and lungs were clear to auscultation bilaterally.   Heart: heart rate was normal and rhythm regular, normal S1 and S2  Vascular:. there was no peripheral edema  Abdomen: normal bowel sounds, soft, non-tender  Neurological: no focal deficits.   Psychiatric: the affect was normal      LABS:                        13.6   12.00 )-----------( 487      ( 22 May 2023 22:28 )             43.8         x   |  x   |  x   ----------------------------<  x   3.1<L>   |  x   |  x     Ca    9.2      22 May 2023 23:18  Mg     1.7         TPro  7.5  /  Alb  3.8  /  TBili  <0.2  /  DBili  x   /  AST  13  /  ALT  10  /  AlkPhos  155<H>      PT/INR - ( 22 May 2023 23:18 )   PT: 12.3 sec;   INR: 1.03          PTT - ( 22 May 2023 23:18 )  PTT:31.4 sec  Urinalysis Basic - ( 22 May 2023 23:18 )    Color: Yellow / Appearance: Clear / S.020 / pH: x  Gluc: x / Ketone: NEGATIVE  / Bili: Negative / Urobili: 0.2 E.U./dL   Blood: x / Protein: 30 mg/dL / Nitrite: POSITIVE   Leuk Esterase: Small / RBC: 5-10 /HPF / WBC > 10 /HPF   Sq Epi: x / Non Sq Epi: x / Bacteria: Present /HPF        MICROBIOLOGY:    RADIOLOGY & ADDITIONAL STUDIES:   INFECTIOUS DISEASES INITIAL CONSULT NOTE    HPI:  44 yo F with PMHx asthma, hx PE (Eliquis), DM, HTN, HLD, hx abdominal necrotizing fasciitis (s/p suprapubic catheter, ostomy placement in 2021) px to [facility] ED from home with worsening abd/suprapubic pain and drainage from prior suprapubic catheter insertion site with concern for infection. Patient endorsed the fluid from suprapubic catheter site was initially yellow in color, now gray.    Patient with recent admission at [facility] (-) during which time she was presenting with similar complaints of increased pain in the suprapubic catheter site admitted for severe sepsis with Klebsiella Pneumoniae treated with Gentamicin 500mg IVPB Q24 x5d. Suprapubic catheter not exchanged due to imaging showing adequate positioning. Prior urine culture grew Carbapenem resistant and ESBL Klebsiella.    On current admission, pt reports having last seen her PCP (Dr. Sánchez) on  during which time he urged her to go seek medical attention due to concern for infection at the old suprapubic catheter site however pt was unable to do so due to childcare responsibilities. She is presenting to the ED now because of ongoing purulent drainage from the suprapubic catheter site and increased pain in the lower abd not relieved at home with use of OTC Tylenol. She denies subjective fevers/chills, chest pain, shortness of breath, diarrhea/increased ostomy output. She has noticed increased sediment in her urinary collection bag. No sick contacts or recent travel. No recent abx use aside from IV Gentamycin on prior admission. She reports her most distressing sx at this time is pain.     ED COURSE  VS: Tmax 99F (oral), , -130/80, RR 16, SpO2 97% (RA)   Labs: WBC 12, Plt 487, Lactate 5.3 -> 2.2 -> 2.3 -> 1.7, K 2.6, Bicarb 20, Glu 270, Alk Phos 155  EKG: Sinus tachycardia, ,   UA: Yellow, clear, Protein 30, Moderate blood, Small LE, Positive nitrites, >10 WBC  Imaging:  - CTAP with IV contrast: 4.5cm cystic structure in R ovary. No obstructive uropathy. Bladder decompressed by suprapubic catheter. Post-surgical changes in the bowel without evidence of bowel obstruction.   - US Pelvis: No evidence of ovarian torsion. 3.7cm simple-appearing right ovarian cyst.   Orders: Morphine 4mg IVP x1, Benadryl 50mg IVP x1, Gentamicin 500mg IV x1, Dilaudid 1mg IVP x1, Toradol 15mg IVP x1, Zofran 4mg IVP x1, Potassium Cl 10mEq IV x3, Potassium Cl 40mEq PO x1, 2L NS   Consults: GYN, Urology (23 May 2023 08:26)      PAST MEDICAL & SURGICAL HISTORY:  Essential hypertension      Hyperlipidemia      Diabetes      Obesity      Abdominal hernia      Pre-eclampsia      Pulmonary embolism      H/O LEEP      History of D&C      H/O:       H/O ventral hernia repair      H/O exploratory laparotomy            Review of Systems:   Constitutional, eyes, ENT, cardiovascular, respiratory, gastrointestinal, genitourinary, integumentary, neurological, psychiatric and heme/lymph are otherwise negative other than noted above       ANTIBIOTICS:  MEDICATIONS  (STANDING):  albuterol    90 MICROgram(s) HFA Inhaler 1 Puff(s) Inhalation daily  dextrose 5%. 1000 milliLiter(s) (100 mL/Hr) IV Continuous <Continuous>  dextrose 5%. 1000 milliLiter(s) (50 mL/Hr) IV Continuous <Continuous>  dextrose 50% Injectable 25 Gram(s) IV Push once  dextrose 50% Injectable 12.5 Gram(s) IV Push once  dextrose 50% Injectable 25 Gram(s) IV Push once  glucagon  Injectable 1 milliGRAM(s) IntraMuscular once  insulin glargine Injectable (LANTUS) 25 Unit(s) SubCutaneous at bedtime  insulin lispro (ADMELOG) corrective regimen sliding scale   SubCutaneous three times a day before meals  insulin lispro (ADMELOG) corrective regimen sliding scale   SubCutaneous at bedtime  magnesium sulfate  IVPB 2 Gram(s) IV Intermittent once    MEDICATIONS  (PRN):  acetaminophen     Tablet .. 650 milliGRAM(s) Oral every 6 hours PRN Temp greater or equal to 38C (100.4F), Mild Pain (1 - 3)  dextrose Oral Gel 15 Gram(s) Oral once PRN Blood Glucose LESS THAN 70 milliGRAM(s)/deciliter      Allergies    shellfish. (Hives; Anaphylaxis)  penicillin (Hives)  clindamycin (Pruritus)  Vancomycin (hives and pruritis)  amoxicillin (Unknown)  iodine containing compounds (Hives; Anaphylaxis)  fish (Hives; Urticaria)    Intolerances    Bactrim I.V. (Rash (Mod to Severe))      SOCIAL HISTORY: Patient denied    FAMILY HISTORY:  FH: diabetes mellitus  father and mother    FH: hypertension  father and mother     no FH leading to current infection    Vital Signs Last 24 Hrs  T(C): 36.7 (23 May 2023 10:31), Max: 37.2 (22 May 2023 21:53)  T(F): 98.1 (23 May 2023 10:31), Max: 99 (22 May 2023 21:53)  HR: 101 (23 May 2023 10:31) (87 - 133)  BP: 170/90 (23 May 2023 10:31) (121/79 - 170/90)  BP(mean): --  RR: 19 (23 May 2023 10:31) (16 - 19)  SpO2: 98% (23 May 2023 10:31) (97% - 98%)    Parameters below as of 23 May 2023 10:31  Patient On (Oxygen Delivery Method): room air          PHYSICAL EXAM:  Constitutional: alert, NAD  Eyes: the sclera and conjunctiva were normal.   ENT: the ears and nose were normal in appearance.   Neck: the appearance of the neck was normal and the neck was supple.   Pulmonary: no respiratory distress and lungs were clear to auscultation bilaterally.   Heart: heart rate was normal and rhythm regular, normal S1 and S2  Vascular:. there was no peripheral edema  Abdomen: obese, erythema around second suprapubic catheter site (second suprapubic catheter site is superior to the first suprapubic catheter ostomy)-- current catheter is visible through the old catheter site; patient denied rest of abdominal exam d/t extreme pain  Neurological: no focal deficits.   Psychiatric: the affect was normal      LABS:                        13.6   12.00 )-----------( 487      ( 22 May 2023 22:28 )             43.8         x   |  x   |  x   ----------------------------<  x   3.1<L>   |  x   |  x     Ca    9.2      22 May 2023 23:18  Mg     1.7         TPro  7.5  /  Alb  3.8  /  TBili  <0.2  /  DBili  x   /  AST  13  /  ALT  10  /  AlkPhos  155<H>  05-22    PT/INR - ( 22 May 2023 23:18 )   PT: 12.3 sec;   INR: 1.03          PTT - ( 22 May 2023 23:18 )  PTT:31.4 sec  Urinalysis Basic - ( 22 May 2023 23:18 )    Color: Yellow / Appearance: Clear / S.020 / pH: x  Gluc: x / Ketone: NEGATIVE  / Bili: Negative / Urobili: 0.2 E.U./dL   Blood: x / Protein: 30 mg/dL / Nitrite: POSITIVE   Leuk Esterase: Small / RBC: 5-10 /HPF / WBC > 10 /HPF   Sq Epi: x / Non Sq Epi: x / Bacteria: Present /HPF        MICROBIOLOGY: Pending    RADIOLOGY & ADDITIONAL STUDIES: CT abdomen/pelvis showed suprapubic pigtail catheter within collapsed urinary bladder, unchanged R ovarian cyst, b/l non obstructive kidney stones; US abd show simple R ovarian cyst

## 2023-05-23 NOTE — H&P ADULT - PROBLEM SELECTOR PLAN 6
Hx of DM. Prior hospital course c/b labile blood glucose with endocrine consulted. Last A1C 12 prior admission. Home regimen per prior discharge includes Humulin 100U TID.     - mISS   - Diet, Consistent Carb Hx of DM. Prior hospital course c/b labile blood glucose with endocrine consulted. Last A1C 12 prior admission. Home regimen per prior discharge includes Humulin 100U TID.     - mISS   - Diet, Consistent Carb  - Based on prior endocrine recommendations, resume Lantus 20U QAM and 25U QPM

## 2023-05-23 NOTE — H&P ADULT - ASSESSMENT
44 yo F with PMHx hx PE (Eliquis), DM, HTN, HLD, hx abdominal necrotizing fasciitis (s/p suprapubic catheter, ostomy placement in 11/2021) px to [facility] ED from home with worsening abd/suprapubic pain and drainage from prior suprapubic catheter insertion site found to have sepsis sepsis 2/2 UTI and catheter-site infection. 44 yo F with PMHx asthma, hx PE (Eliquis), DM, HTN, HLD, hx abdominal necrotizing fasciitis (s/p suprapubic catheter, ostomy placement in 11/2021) px to [facility] ED from home with worsening abd/suprapubic pain and drainage from prior suprapubic catheter insertion site found to have sepsis sepsis 2/2 UTI and catheter-site infection.

## 2023-05-23 NOTE — H&P ADULT - PROBLEM SELECTOR PLAN 3
Initial labs remarkable for K 2.6, repleted with potassium Cl 10mEq IVPB x3 with repeat potassium 3.1. EKG showing sinus tachycardia without prolonged QTC or any other changes.     - F/U repeat BMP s/p additional K repletion Initial labs remarkable for K 2.6, repleted with potassium Cl 10mEq IVPB x3 with repeat potassium 3.1. EKG showing sinus tachycardia without prolonged QTC or any other changes. Pt reports decreased PO intake 2/2 pain but denies any other insensible losses, no increased ostomy output.     - F/U repeat BMP s/p additional K repletion

## 2023-05-23 NOTE — DISCHARGE NOTE PROVIDER - NSDCFUADDAPPT_GEN_ALL_CORE_FT
Please call to make a follow up appointment with Pain management, Dr. Suh, to adress continued pain management through medication.       Please follow up with the endocrine team after discharge with [facility] by calling [phone] to make an appointment at your earliest convenience.

## 2023-05-23 NOTE — CONSULT NOTE ADULT - SUBJECTIVE AND OBJECTIVE BOX
43y  LMP 5/10 HTN, HLD, b/l PE on Eliquis (23), Abdominal Necrotizing fascitis (s/p suprapubic catheter and ostomy bag placement on 21 at [facility]), and PCOS found to have 3 cm simple right ovarian cyst on imaging today presenting with worsening lower abdominal pain. Patient has had significant abdominal pain since the start of the necrotizing fasciitis infection, but the pain has worsened over the last week. It is "11/10" pain daily over the whole lower abdomen. Worse with movement but also worse if she sits still for too long. Nothing improves the pain significantly     Denies fever, chills, HA, chest pain, palpitations, SOB, N/V, abdominal pain, diarrhea/ constipation, vaginal bleeding, dysuria, or abnormal vaginal discharge.    OB H/x:  GYN H/x: Denies hx of fibroids, abnormal pap smears, STIs, ovarian cysts    MED H/x:  SURG H/x:  Medications:  Allergies: NKDA    Vital Signs Last 24 Hrs  T(C): 36.5 (23 May 2023 08:27), Max: 37.2 (22 May 2023 21:53)  T(F): 97.7 (23 May 2023 08:27), Max: 99 (22 May 2023 21:53)  HR: 92 (23 May 2023 08:27) (87 - 133)  BP: 133/83 (23 May 2023 08:27) (121/79 - 158/99)  BP(mean): --  RR: 16 (23 May 2023 08:27) (16 - 18)  SpO2: 98% (23 May 2023 08:27) (97% - 98%)    Parameters below as of 23 May 2023 08:27  Patient On (Oxygen Delivery Method): room air        Physical Exam:  Gen: NAD, comfortable  GI: + BS, soft, nontender, nondistended, no rebound no guarding  BME: normal anteverted uterus, no adnexal masses appreciated , No cervical motion tenderness   Spec:   Ext: no edema, erythema, tenderness     LABS:                        13.6   12.00 )-----------( 487      ( 22 May 2023 22:28 )             43.8     05-23    x   |  x   |  x   ----------------------------<  x   3.1<L>   |  x   |  x     Ca    9.2      22 May 2023 23:18  Mg     1.7         TPro  7.5  /  Alb  3.8  /  TBili  <0.2  /  DBili  x   /  AST  13  /  ALT  10  /  AlkPhos  155<H>      PT/INR - ( 22 May 2023 23:18 )   PT: 12.3 sec;   INR: 1.03          PTT - ( 22 May 2023 23:18 )  PTT:31.4 sec  Urinalysis Basic - ( 22 May 2023 23:18 )    Color: Yellow / Appearance: Clear / S.020 / pH: x  Gluc: x / Ketone: NEGATIVE  / Bili: Negative / Urobili: 0.2 E.U./dL   Blood: x / Protein: 30 mg/dL / Nitrite: POSITIVE   Leuk Esterase: Small / RBC: 5-10 /HPF / WBC > 10 /HPF   Sq Epi: x / Non Sq Epi: x / Bacteria: Present /HPF        RADIOLOGY & ADDITIONAL STUDIES:     43y  LMP 5/10 HTN, HLD, b/l PE on Eliquis (23), Abdominal Necrotizing fascitis (s/p suprapubic catheter and ostomy bag placement on 21 at [facility]), and PCOS found to have 3 cm simple right ovarian cyst on imaging today presenting with worsening lower abdominal pain. Patient has had significant abdominal pain since the start of the necrotizing fasciitis infection, but the pain has worsened over the last week. It is "11/10" pain daily over the whole lower abdomen. Worse with movement but also worse if she sits still for too long. Nothing improves the pain significantly, including Motrin. She reports she is no longer sexually active as the skin was removed from her right labia majora and the introitus to the vagina is strictured and painful, so that she cannot put a tampon "or even a finger" in her vagina. Reports mild nausea with the pain; denies vomiting. Denies fever, chills, HA, chest pain, palpitations, SOB, abdominal pain, vaginal bleeding, dysuria, or abnormal vaginal discharge.    OB H/x: SAB x3, C/S x3 (, , - reports all 3 were early 2/2 PTL)  GYN H/x: PCOS. MARIAM III s/p LEEP in  and , normal paps since. Denies hx of fibroids, STIs    MED H/x: HTN, HLD, b/l PE on Eliquis (23), Abdominal Necrotizing fascitis, asthma, migraines  SURG H/x: multiple surgeries to removed infected skin and tissue from necrotizing fasciitis, including ostomy and suprapubic catheter placement x2, inguinal hernia repair x2, C/S x3    Home Medications:  apixaban 5 mg oral tablet: 1 tab(s) orally every 12 hours (23 May 2023 08:45)  HumuLIN R (Concentrated) 500 units/mL subcutaneous solution: 100 unit(s) subcutaneous 3 times a day (23 May 2023 08:45)  labetalol 100 mg oral tablet: 1 dose(s) orally once a day (23 May 2023 08:45)  losartan 50 mg oral tablet: 1 tab(s) orally once a day (23 May 2023 08:45)  ventalin  capsaicin cream     Allergies:   shellfish. (Hives; Anaphylaxis)  penicillin (Hives)  clindamycin (Pruritus)  amoxicillin (Unknown)  iodine containing compounds (Hives; Anaphylaxis)  fish (Hives; Urticaria)    Intolerances  Bactrim I.V. (Rash (Mod to Severe))      Vital Signs Last 24 Hrs  T(C): 36.5 (23 May 2023 08:27), Max: 37.2 (22 May 2023 21:53)  T(F): 97.7 (23 May 2023 08:27), Max: 99 (22 May 2023 21:53)  HR: 92 (23 May 2023 08:27) (87 - 133)  BP: 133/83 (23 May 2023 08:27) (121/79 - 158/99)  BP(mean): --  RR: 16 (23 May 2023 08:27) (16 - 18)  SpO2: 98% (23 May 2023 08:27) (97% - 98%)    Parameters below as of 23 May 2023 08:27  Patient On (Oxygen Delivery Method): room air        Physical Exam:  Gen: NAD, comfortable  GI: soft, tender to palpation over general lower abdomen, nondistended, no rebound no guarding  GYN: declined exam  Ext: no edema, erythema, tenderness     LABS:                        13.6   12.00 )-----------( 487      ( 22 May 2023 22:28 )             43.8     05-23    x   |  x   |  x   ----------------------------<  x   3.1<L>   |  x   |  x     Ca    9.2      22 May 2023 23:18  Mg     1.7     -    TPro  7.5  /  Alb  3.8  /  TBili  <0.2  /  DBili  x   /  AST  13  /  ALT  10  /  AlkPhos  155<H>  05-22    PT/INR - ( 22 May 2023 23:18 )   PT: 12.3 sec;   INR: 1.03          PTT - ( 22 May 2023 23:18 )  PTT:31.4 sec  Urinalysis Basic - ( 22 May 2023 23:18 )    Color: Yellow / Appearance: Clear / S.020 / pH: x  Gluc: x / Ketone: NEGATIVE  / Bili: Negative / Urobili: 0.2 E.U./dL   Blood: x / Protein: 30 mg/dL / Nitrite: POSITIVE   Leuk Esterase: Small / RBC: 5-10 /HPF / WBC > 10 /HPF   Sq Epi: x / Non Sq Epi: x / Bacteria: Present /HPF        RADIOLOGY & ADDITIONAL STUDIES:  < from: CT Abdomen and Pelvis w/ IV Cont (23 @ 03:19) >  ACC: [id number] EXAM:  CT ABDOMEN AND PELVIS IC   ORDERED BY: FEDERICO PARKS     PROCEDURE DATE:  2023          INTERPRETATION:  CT Abdomen And Pelvis With Contrast 2023 3:00 AM    Clinical indication: Lower abdo pain, history suprapubic and ostomy    TECHNIQUE: Computed tomography of the abdomen and pelvis with contrast.  Contrast material: IV: ISOVUE 370; Contrast volume: 94 ml    COMPARISON: CT 2023 and 2023    FINDINGS:  Lungs: Visualized lung bases are clear. No pleural effusions.    Liver: Unremarkable.  Gallbladder and bile ducts: No calcified gallstones. No  intrahepatic or extrahepatic biliary ductal dilation.  Pancreas: Unremarkable.  Spleen: 1.3 cm cyst, unchanged.  Adrenal glands: Stable 1 cm left adrenal nodule. The right  adrenal gland is unremarkable.  Kidneys and ureters: 2 mm nonobstructive calculus midportion right   kidney. 2 mm nonobstructive calculus lower pole left kidney. No   hydronephrosis in either side.    Stomach and bowel: Status post Brandie procedure with left lower   quadrant colostomy. Normal appendix. No bowel obstruction.      Intraperitoneal space: No ascites or free air.    Retroperitoneal space: No retroperitoneal collection or mass.  Vasculature: No abdominal aortic aneurysm..  Urinary bladder: Decompressed by an indwelling suprapubic catheter.  Reproductive: 4.5 cm cyst in the right ovary, new since 2023,  a presumed physiologic cyst.  Bones/joints: Degenerative changes. Loss of intervertebral disc space at   L1 level.  Soft tissues: Postoperative scarring. Left lower quadrant colostomy with   a fat-containing parastomal hernia. Multiple surgical clips in the medial  right gluteal region.    IMPRESSION:    4.5 cm right ovarian cyst, new since 2023.    A tiny nonobstructive calculus in each kidney. No hydronephrosis.    Suprapubic pigtail catheter within collapsed urinary bladder.    Postoperative changes in the bowel abdominal wall with  fat-containing   parastomal hernia. No fluid collection.    --- End of Report ---    < end of copied text >    < from: US Pelvis Complete (US Pelvis Complete .) (23 @ 07:12) >  ACC: [id number] EXAM:  US PELVIC COMPLETE   ORDERED BY: FEDERICO PARKS     PROCEDURE DATE:  2023          INTERPRETATION:  CLINICAL INFORMATION: Right ovarian cyst evaluate for   torsion. 43-year-old female.    LMP: 2023.    COMPARISON: CTabdomen and pelvis 2023    TECHNIQUE:  Transabdominal pelvic sonogram only. Color and Spectral Doppler was   performed.    FINDINGS:  Uterus: 9.2 cm x 4.9 cm x 6.5 cm. Within normal limits.  Endometrium: 0.6 cm. Within normal limits.    Right ovary: 4.8 cm x 3.0 cm x 3.7 cm. 3.7 cm ovarian cyst. Normal   arterial and venous waveforms.  Left ovary: 3.6 cm x 2.3 cm x 2.2 cm. Within normal limits. Normal   arterial and venous waveforms.    Fluid: None.    IMPRESSION: Limited study-only transabdominal. Patient refused   endovaginal.  No demonstrated evidence of ovarian torsion. 3.7 cm simple appearing   right ovarian cyst..        --- End of Report ---    < end of copied text >

## 2023-05-23 NOTE — H&P ADULT - PROBLEM SELECTOR PLAN 10
F: s/p 2L NS in ED  E: Replete PRN  N: Diet, DASH/TLC/CC  DVT ppx: Holding Eliquis i/s/o procedure  GI ppx: None  Bowel: None  Dispo: RMF    FULL CODE F: s/p 2L NS in ED  E: Replete PRN  N: Diet, DASH/TLC/CC  DVT ppx: Holding Eliquis i/s/o procedure  GI ppx: None  Bowel: None  Dispo: [facility]    PCP: Dr. Pelon Sánchez ([place] @ [facility])     FULL CODE

## 2023-05-23 NOTE — H&P ADULT - PROBLEM SELECTOR PLAN 5
Hx of necrotizing fasciitis tx at [facility] in 11/2021 with hospital course c/b bowel resection and ostomy formation. On ROS, no increased output from ostomy bag and site appears clean/dry.    - Routine ostomy care  - Monitor ostomy output Hx of necrotizing fasciitis tx at [facility] in 11/2021 with hospital course c/b bowel resection and ostomy formation. On ROS, no increased output from ostomy bag and site appears clean/dry.    - Routine ostomy care  - Monitor ostomy output  - General surgery outpatient appointment for possible ostomy reversal

## 2023-05-23 NOTE — H&P ADULT - PROBLEM SELECTOR PLAN 8
CTPE on 4/4 showing bilateral PEs. Home regimen includes Eliquis 5mg Q12.    - Holding Eliquis pending possible suprapubic catheter exchange procedure

## 2023-05-23 NOTE — ED PROVIDER NOTE - CLINICAL SUMMARY MEDICAL DECISION MAKING FREE TEXT BOX
tachycardic, febrile, suspect urinary source, empirically treated, per review of ID noted last recommended gentamicin for urinary coverage. vancomycin added for broad spectrum coverage. initial lactate elevated, trending down. pending CT. per chart patient with contrast allergy, however she states her reaction is hives and is prevented with benadryl, and that solumedrol makes it worse, solumedrol therefore withed.

## 2023-05-23 NOTE — ED PROVIDER NOTE - OBJECTIVE STATEMENT
43F PMH DM, HTN, HLD, Abdominal Necrotizing fascitis (s/p suprapubic catheter and ostomy bag placement on 11/11/21 at [facility]), PE on eliquis, d/c ~3 weeks ago after admission for sepsis presenting for lower abdominal pain. also sees purulence coming from her suprapubic site. pain has been present since her recent admission but has worsened over the past week. also with nausea

## 2023-05-23 NOTE — ED PROVIDER NOTE - PHYSICAL EXAMINATION
General: Awake, alert and oriented. uncomfortable appearing  Skin: Skin in warm, dry and intact without rashes or lesions. Appropriate color for ethnicity  HENMT: head normocephalic and atraumatic; bilateral external ears without swelling. no nasal discharge. moist oral mucosa. supple neck, trachea midline  EYES: Conjunctiva clear. nonicteric sclera. EOM intact, Eyelids are normal in appearance without swelling or lesions.  Cardiac: well perfused  Respiratory: breathing comfortably on room air. no audible wheezing or stridor  Abdominal: nondistended, soft. ostomy in place. suprapubic atheter in place draining yellow tinged urine. diffusely tender in lower abdomen, most severe on LLQ. mild bilateral cva ttp.   MSK: Neck and back are without deformity, visible external skin changes, or signs of trauma. Curvature of the cervical, thoracic, and lumbar spine are within normal limits. no external signs of trauma. no apparent deficits in ROM of any extremity  Neurological: The patient is awake, alert and oriented to person, place, and time with normal speech. CN 2-12 grossly intact. no apparent deficits. Memory is normal and thought process is intact. No gait abnormalities are appreciated.   Psychiatric: Appropriate mood and affect. Good judgement and insight General: Awake, alert and oriented. uncomfortable appearing  Skin: Skin in warm, dry and intact without rashes or lesions. Appropriate color for ethnicity  HENMT: head normocephalic and atraumatic; bilateral external ears without swelling. no nasal discharge. moist oral mucosa. supple neck, trachea midline  EYES: Conjunctiva clear. nonicteric sclera. EOM intact, Eyelids are normal in appearance without swelling or lesions.  Cardiac: well perfused  Respiratory: breathing comfortably on room air. no audible wheezing or stridor  Abdominal: nondistended, soft. ostomy in place. suprapubic catheter in place draining yellow tinged urine. old suprapubic site visible inferior to present site, catheter visible through old site, with drainge with purulence from site, diffusely tender in lower abdomen, most severe on LLQ. mild bilateral cva ttp. ostomy site present, no drainge or ttp  MSK: Neck and back are without deformity, visible external skin changes, or signs of trauma. Curvature of the cervical, thoracic, and lumbar spine are within normal limits. no external signs of trauma. no apparent deficits in ROM of any extremity  Neurological: The patient is awake, alert and oriented to person, place, and time with normal speech. CN 2-12 grossly intact. no apparent deficits. Memory is normal and thought process is intact. No gait abnormalities are appreciated.   Psychiatric: Appropriate mood and affect. Good judgement and insight

## 2023-05-23 NOTE — H&P ADULT - HISTORY OF PRESENT ILLNESS
44 yo F with PMHx hx PE (Eliquis), DM, HTN, HLD, hx abdominal necrotizing fasciitis (s/p suprapubic catheter, ostomy placement in 11/2021) px to [facility] ED from home with worsening abd/suprapubic pain and drainage from prior suprapubic catheter insertion site with concern for infection.     Patient with recent admission at [facility] (4/28-5/2) during which time she was presenting with similar complaints of increased pain in the suprapubic catheter site admitted for severe sepsis with Klebsiella Pneumoniae treated with Gentamicin 500mg IVPB Q24 x5d. Suprapubic catheter not exchanged due to imaging showing adequate positioning.    On current admission, pt reports ____.      ED COURSE  VS: Tmax 99F (oral), , -130/80, RR 16, SpO2 97% (RA)   Labs: WBC 12, Plt 487, Lactate 5.3 -> 2.2 -> 2.3 -> 1.7, K 2.6, Bicarb 20, Glu 270, Alk Phos 155  EKG: Sinus tachycardia, ,   UA: Yellow, clear, Protein 30, Moderate blood, Small LE, Positive nitrites, >10 WBC  Imaging:  - CTAP with IV contrast: 4.5cm cystic structure in R ovary. No obstructive uropathy. Bladder decompressed by suprapubic catheter. Post-surgical changes in the bowel without evidence of bowel obstruction.   - US Pelvis: No evidence of ovarian torsion. 3.7cm simple-appearing right ovarian cyst.   Orders: Morphine 4mg IVP x1, Benadryl 50mg IVP x1, Gentamicin 500mg IV x1, Dilaudid 1mg IVP x1, Toradol 15mg IVP x1, Zofran 4mg IVP x1, Potassium Cl 10mEq IV x3, Potassium Cl 40mEq PO x1, 2L NS   Consults: GYN, Urology 42 yo F with PMHx asthma, hx PE (Eliquis), DM, HTN, HLD, hx abdominal necrotizing fasciitis (s/p suprapubic catheter, ostomy placement in 11/2021) px to [facility] ED from home with worsening abd/suprapubic pain and drainage from prior suprapubic catheter insertion site with concern for infection.     Patient with recent admission at [facility] (4/28-5/2) during which time she was presenting with similar complaints of increased pain in the suprapubic catheter site admitted for severe sepsis with Klebsiella Pneumoniae treated with Gentamicin 500mg IVPB Q24 x5d. Suprapubic catheter not exchanged due to imaging showing adequate positioning.    On current admission, pt reports having last seen her PCP (Dr. Sánchez) on Wednesday 5/17 during which time he urged her to go seek medical attention due to concern for infection at the old suprapubic catheter site however pt was unable to do so due to childcare responsibilities. She is presenting to the ED now because of ongoing purulent drainage from the suprapubic catheter site and increased pain in the lower abd not relieved at home with use of OTC Tylenol. She denies subjective fevers/chills, chest pain, shortness of breath, diarrhea/increased ostomy output. She has noticed increased sediment in her urinary collection bag. No sick contacts or recent travel. No recent abx use aside from IV Gentamycin on prior admission. She reports her most distressing sx at this time is pain.     ED COURSE  VS: Tmax 99F (oral), , -130/80, RR 16, SpO2 97% (RA)   Labs: WBC 12, Plt 487, Lactate 5.3 -> 2.2 -> 2.3 -> 1.7, K 2.6, Bicarb 20, Glu 270, Alk Phos 155  EKG: Sinus tachycardia, ,   UA: Yellow, clear, Protein 30, Moderate blood, Small LE, Positive nitrites, >10 WBC  Imaging:  - CTAP with IV contrast: 4.5cm cystic structure in R ovary. No obstructive uropathy. Bladder decompressed by suprapubic catheter. Post-surgical changes in the bowel without evidence of bowel obstruction.   - US Pelvis: No evidence of ovarian torsion. 3.7cm simple-appearing right ovarian cyst.   Orders: Morphine 4mg IVP x1, Benadryl 50mg IVP x1, Gentamicin 500mg IV x1, Dilaudid 1mg IVP x1, Toradol 15mg IVP x1, Zofran 4mg IVP x1, Potassium Cl 10mEq IV x3, Potassium Cl 40mEq PO x1, 2L NS   Consults: GYN, Urology

## 2023-05-23 NOTE — DISCHARGE NOTE PROVIDER - NPI NUMBER (FOR SYSADMIN USE ONLY) :
[[id number] [[id number]] [[id number]]

## 2023-05-23 NOTE — ED ADULT NURSE REASSESSMENT NOTE - NS ED NURSE REASSESS COMMENT FT1
Lactic acid results called from lab, pt upgraded to Dr. Burrows.
Pt states "I am itching all over, it must be the antibiotics". No s/s of rash noted, pt violently scratching skin. Vancomycin stopped. No s/s respiratory distress noted, no rash, no dysphagia, airway patent. Dr. Burrows notified.
Reassumed care of pt from ARMANDO Tao. Dr. Burrows at bedside for second IV access via US for administration of potassium. Initial IV remains patent, meds on hold for IV insertion.
Reassumed care of pt from Aislinn ROBERTS, covering from 0630 to 0730. Pt resting quietly. Pending admission.
Received patient. patient is stable, A&OX4.
US guided IV completed by Dr. Burrows, ABX and NS resumed. 12 lead EKG in progress.
Report to Aislinn ROBERTS for break coverage. Waiting release of ABX from pharmacy.

## 2023-05-23 NOTE — H&P ADULT - PROBLEM SELECTOR PLAN 9
F: s/p 2L NS in ED  E: Replete PRN  N: Diet, DASH/TLC/CC  DVT ppx: Holding Eliquis i/s/o procedure  GI ppx: None  Bowel: None  Dispo: [facility]    FULL CODE Hx of asthma. Well-controlled.    - Continue Ventolin Q24 PRN

## 2023-05-23 NOTE — CONSULT NOTE ADULT - SUBJECTIVE AND OBJECTIVE BOX
CONSULT NOTE:      HPI:  44 yo F with PMHx hx PE (Eliquis), DM, HTN, HLD, hx abdominal necrotizing fasciitis (s/p suprapubic catheter, ostomy placement in 11/2021) presented to ER today for worsening abd/suprapubic pain and drainage from prior suprapubic catheter insertion site with concern for infection. patient was recently admitted to [facility] (4/28-5/2) for severe sepsis with Klebsiella Pneumoniae treated with Gentamicin 500mg IVPB Q24 x5d. Patient expressed concerns at that time about her Suprapubic catheter, it was recommended by urology to have it exchanged at that time, was not exchanged due to imaging showing adequate positioning.  Denies any fever or chills. Complains of some nausea no vomiting, Denies any changes in the color or quantity of the output from her Suprapubic drainage bag.  Paitent main concern is the pain at her current SPT site and the drainage from her former spt site        ED COURSE  VS: Tmax 99F (oral), , -130/80, RR 16, SpO2 97% (RA)   Labs: WBC 12, Plt 487, Lactate 5.3 -> 2.2 -> 2.3 -> 1.7, K 2.6, Bicarb 20, Glu 270, Alk Phos 155  EKG: Sinus tachycardia, ,   UA: Yellow, clear, Protein 30, Moderate blood, Small LE, Positive nitrites, >10 WBC  Imaging:  - CTAP with IV contrast: 4.5cm cystic structure in R ovary. No obstructive uropathy. Bladder decompressed by suprapubic catheter. Post-surgical changes in the bowel without evidence of bowel obstruction.   - US Pelvis: No evidence of ovarian torsion. 3.7cm simple-appearing right ovarian cyst.   Orders: Morphine 4mg IVP x1, Benadryl 50mg IVP x1, Gentamicin 500mg IV x1, Dilaudid 1mg IVP x1, Toradol 15mg IVP x1, Zofran 4mg IVP x1, Potassium Cl 10mEq IV x3, Potassium Cl 40mEq PO x1, 2L NS   Consults: GYN, Urology (23 May 2023 08:26)      Vital Signs Last 24 Hrs  T(C): 36.5 (23 May 2023 08:27), Max: 37.2 (22 May 2023 21:53)  T(F): 97.7 (23 May 2023 08:27), Max: 99 (22 May 2023 21:53)  HR: 92 (23 May 2023 08:27) (87 - 133)  BP: 133/83 (23 May 2023 08:27) (121/79 - 158/99)  BP(mean): --  RR: 16 (23 May 2023 08:27) (16 - 18)  SpO2: 98% (23 May 2023 08:27) (97% - 98%)    Parameters below as of 23 May 2023 08:27  Patient On (Oxygen Delivery Method): room air      I&O's Summary      PE:  Gen: nad  Abd: soft, obese, nd, + TTP of area surrounding SPT, current SPT visible through old SPT opening likely because spt is going through her pannus and then into the bladder  : SPT with clear urine draining      LABS:                        13.6   12.00 )-----------( 487      ( 22 May 2023 22:28 )             43.8     05-23    x   |  x   |  x   ----------------------------<  x   3.1<L>   |  x   |  x     Ca    9.2      22 May 2023 23:18  Mg     1.7     05-22    TPro  7.5  /  Alb  3.8  /  TBili  <0.2  /  DBili  x   /  AST  13  /  ALT  10  /  AlkPhos  155<H>  05-22    PT/INR - ( 22 May 2023 23:18 )   PT: 12.3 sec;   INR: 1.03          PTT - ( 22 May 2023 23:18 )  PTT:31.4 sec  Cultures  Culture Results:   Culture in progress (05-22 @ 23:18)      A/P 44 yo F with pain at SPT site  1) f/u urine culture  2) antibiotics  3) IR to evaluate patient and exchange SPT/ change SPT site

## 2023-05-23 NOTE — H&P ADULT - NSHPPHYSICALEXAM_GEN_ALL_CORE
VITAL SIGNS:  T(C): 36.5 (05-23-23 @ 08:27), Max: 37.2 (05-22-23 @ 21:53)  T(F): 97.7 (05-23-23 @ 08:27), Max: 99 (05-22-23 @ 21:53)  HR: 92 (05-23-23 @ 08:27) (87 - 133)  BP: 133/83 (05-23-23 @ 08:27) (121/79 - 158/99  RR: 16 (05-23-23 @ 08:27) (16 - 18)  SpO2: 98% (05-23-23 @ 08:27) (97% - 98%)    PHYSICAL EXAM:  Constitutional: Resting comfortably in bed; NAD  Head: NC/AT  Eyes: PERRL, EOMI, clear conjunctiva  ENT: no nasal discharge; uvula midline, no oropharyngeal erythema or exudates; MMM  Neck: supple; no JVD or thyromegaly  Respiratory: CTA B/L; no W/R/R, no retractions  Cardiac: +S1/S2; RRR; no M/R/G;  Gastrointestinal: soft, NT/ND; no rebound or guarding; +BSx4  Extremities: WWP, no clubbing or cyanosis; no peripheral edema  Musculoskeletal: NROM x4; no joint swelling, tenderness or erythema  Vascular: 2+ radial, femoral, DP/PT pulses B/L  Dermatologic: skin warm, dry and intact; no rashes, wounds, or scars  Neurologic: AAOx3; CNII-XII grossly intact; no focal deficits  Psychiatric: affect and characteristics of appearance, verbalizations, behaviors are appropriate VITAL SIGNS:  T(C): 36.5 (05-23-23 @ 08:27), Max: 37.2 (05-22-23 @ 21:53)  T(F): 97.7 (05-23-23 @ 08:27), Max: 99 (05-22-23 @ 21:53)  HR: 92 (05-23-23 @ 08:27) (87 - 133)  BP: 133/83 (05-23-23 @ 08:27) (121/79 - 158/99  RR: 16 (05-23-23 @ 08:27) (16 - 18)  SpO2: 98% (05-23-23 @ 08:27) (97% - 98%)    PHYSICAL EXAM:  Constitutional: Resting comfortably in ED stretcher, eating breakfast and watching Youtube videos   Eyes: Clear conjunctiva  ENT: MMM  Neck: supple; no JVD   Respiratory: CTA B/L; no W/R/R, no retractions. Satting well on RA.   Cardiac: +S1/S2; Tachycardic. No murmurs, rubs, gallops.   Gastrointestinal: soft, NT/ND; no rebound or guarding; +BSx4. Ostomy site appears clean/dry. Ostomy bag empty.  : Current suprapubic catheter site in place with some surrounding purple skin change and no active drainage. Former suprapubic catheter site open without visible drainage. Surrounding skin appears moist but without erythema or signs of infection.   Extremities: WWP, no clubbing or cyanosis; no peripheral edema  Musculoskeletal: NROM x4; no joint swelling, tenderness or erythema. L foot drop.   Vascular: 2+ radial, DP/PT pulses B/L  Neurologic: AAOx3; L foot drop. No other focal neurological deficits.   Psychiatric: Comfortable/calm but anxious at times, concerned about pain.

## 2023-05-23 NOTE — H&P ADULT - PROBLEM SELECTOR PLAN 4
CTAP in ED showing 4.5 cm right ovarian cyst. US Pelvis performed showing no demonstrated evidence of ovarian torsion and 3.7 cm simple appearing  right ovarian cyst..    - GYN consulted in ED, no acute interventions  - Outpatient follow-up

## 2023-05-23 NOTE — ED PROVIDER NOTE - CRITICAL CARE ATTENDING CONTRIBUTION TO CARE
I have spent the above time ordering and reviewing of laboratory, radiology, and cardiac tests, discussing the patient with consultants, having discussions with the patient, and monitoring for potential decompensation

## 2023-05-23 NOTE — DISCHARGE NOTE PROVIDER - NSDCMRMEDTOKEN_GEN_ALL_CORE_FT
apixaban 5 mg oral tablet: 1 tab(s) orally every 12 hours  HumuLIN R (Concentrated) 500 units/mL subcutaneous solution: 100 unit(s) subcutaneous 3 times a day  ibuprofen 400 mg oral tablet: 1 tab(s) orally 3 times a day as needed for  mild pain  labetalol 100 mg oral tablet: 1 dose(s) orally once a day  losartan 50 mg oral tablet: 1 tab(s) orally once a day  Ventolin HFA 90 mcg/inh inhalation aerosol: 1 puff(s) inhaled as needed.    acetaminophen 325 mg oral tablet: 3 tab(s) orally every 8 hours  apixaban 5 mg oral tablet: 1 tab(s) orally every 12 hours  HumuLIN R (Concentrated) 500 units/mL subcutaneous solution: 100 unit(s) subcutaneous 3 times a day  ibuprofen 400 mg oral tablet: 1 tab(s) orally 3 times a day as needed for  mild pain  labetalol 100 mg oral tablet: 1 dose(s) orally once a day  losartan 50 mg oral tablet: 1 tab(s) orally once a day  Ventolin HFA 90 mcg/inh inhalation aerosol: 1 puff(s) inhaled as needed.    acetaminophen 325 mg oral tablet: 3 tab(s) orally every 8 hours  apixaban 5 mg oral tablet: 1 tab(s) orally every 12 hours  HumuLIN R (Concentrated) 500 units/mL subcutaneous solution: 100 unit(s) subcutaneous 3 times a day  ibuprofen 400 mg oral tablet: 1 tab(s) orally 3 times a day as needed for  mild pain  labetalol 100 mg oral tablet: 1 dose(s) orally once a day  losartan 50 mg oral tablet: 1 tab(s) orally once a day  oxyBUTYnin 5 mg oral tablet: 1 tab(s) orally every 8 hours as needed for Bladder spasms Take 1 tablet every 8 hours  Ventolin HFA 90 mcg/inh inhalation aerosol: 1 puff(s) inhaled as needed.    acetaminophen 325 mg oral tablet: 3 tab(s) orally every 8 hours  apixaban 5 mg oral tablet: 1 tab(s) orally every 12 hours  HumuLIN R (Concentrated) 500 units/mL subcutaneous solution: 100 unit(s) subcutaneous 3 times a day  ibuprofen 400 mg oral tablet: 1 tab(s) orally 3 times a day as needed for  mild pain  labetalol 100 mg oral tablet: 1 dose(s) orally once a day  oxyBUTYnin 5 mg oral tablet: 1 tab(s) orally every 8 hours as needed for Bladder spasms Take 1 tablet every 8 hours  Ventolin HFA 90 mcg/inh inhalation aerosol: 1 puff(s) inhaled as needed.

## 2023-05-23 NOTE — DISCHARGE NOTE PROVIDER - NSDCCPTREATMENT_GEN_ALL_CORE_FT
PRINCIPAL PROCEDURE  Procedure: Abdomen CT  Findings and Treatment: Findings:   4.5 cm right ovarian cyst, new since 05/02/2023.  A tiny nonobstructive calculus in each kidney. No hydronephrosis.  Suprapubic pigtail catheter within collapsed urinary bladder.  Postoperative changes in the bowel abdominal wall with  fat-containing   parastomal hernia. No fluid collection.

## 2023-05-23 NOTE — DISCHARGE NOTE PROVIDER - HOSPITAL COURSE
#Discharge: DO NOT DELETE    HOSPITAL COURSE:        Problem List/Main Diagnoses:      New Medications:      PHYSICAL EXAM:  Constitutional: Resting comfortably in bed; NAD  Head: NC/AT  Eyes: PERRL, EOMI, clear conjunctiva  ENT: no nasal discharge; uvula midline, no oropharyngeal erythema or exudates; MMM  Neck: supple; no JVD or thyromegaly  Respiratory: CTA B/L; no W/R/R, no retractions  Cardiac: +S1/S2; RRR; no M/R/G;  Gastrointestinal: soft, NT/ND; no rebound or guarding; +BSx4  Extremities: WWP, no clubbing or cyanosis; no peripheral edema  Musculoskeletal: NROM x4; no joint swelling, tenderness or erythema  Vascular: 2+ radial, femoral, DP/PT pulses B/L  Dermatologic: skin warm, dry and intact; no rashes, wounds, or scars  Neurologic: AAOx3; CNII-XII grossly intact; no focal deficits  Psychiatric: affect and characteristics of appearance, verbalizations, behaviors are appropriate #Discharge: do not delete    44 yo F with PMHx asthma, hx PE (Eliquis), DM, HTN, HLD, hx abdominal necrotizing fasciitis (s/p suprapubic catheter, ostomy placement in 11/2021) who presented to [facility] ED from home with worsening abdominal and suprapubic pain and drainage from prior suprapubic catheter insertion site, admitted for sepsis 2/2 UTI and catheter-site infection on gentamicin and ertapenem.    Problem List/Main Diagnoses (problem-based):     # Severe sepsis.   ·  Plan: Presented with worsening lower abd pain, drainage from suprapubic catheter site. On admission, met 2/4 SIRS (HR, WBC) with elevated lactate 5.3 (cleared after fluids to 1.7). CTAP largely unremarkable. On exam, current cath site with surrounding skin changes and old site open with visible underlying catheter. Infection source = UTI; UCx (5/22) growing carbapenem-resistant Klebsiella & E. Coli. Blood cultures (5/23) NGTD. S/p suprapubic catheter replacement by IR on 5/25 given complicated UTI/sepsis. ID and urology were consulted. Patient started on gentamicin 400mg q24h (weight based dosage, adjusted by trough levels) and started on ertapenem 1000mg q24h.   - c/w ertapenem 1g IV daily - last day: 6/09/23  - c/w gentamicin 400mg IV daily - last day: 06/05/23  - weekly CBC, CMP, gentamicin level as outpatient. Can fax to:  Dr Kauffman [phone]    # UTI (urinary tract infection).   ·  Plan: Admitted for severe sepsis 2/2 UTI. UA positive showing small LE, positive nitrites, >10 WBC. Prior urine cx growing carbapenem-resistant and ESBL Klebsiella. s/p 2L NS, gentamicin and ertapenem in ED. ID and urology following.    # Suprapubic/Abd pain.  Patient was started on oxybutynin 5mg TID PRN per urology for bladder spasms to trial. Received pain regimen of Tylenol 975mg q8hrs, Ibuprofen 200mg q6hrs, Oxycodone IR PRN (moderate 5mg q6h, severe 10mg q4h). Patient started on lidocaine patch for R midback paraspinal tenderness.    # DM (diabetes mellitus).   ·  Plan: Hx of DM. Last A1C 12.2% prior admission. Was discharged on May 3rd on Humulin U-500 100 units TID. Endocrinology consulted, titrating inpatient regimen daily. Patient was given Lantus 70u twice daily, Lispro 10 units with meals and bedtime with Lispro mISS with meals and at bedtime  - resume home humulin U-500 100 units TID on discharge  - f/u with endocrine outpatient    # Right ovarian cyst.   ·  Plan: CTAP in ED showed 4.5cm right ovarian cyst. Transabdominal pelvic US performed which demonstrated 3.7cm simple-appearing right ovarian cyst and no evidence of ovarian torsion. Patient refused endovaginal US which would have yielded more complete imaging. GYN consulted in ED, no acute interventions.    # History of creation of ostomy.   ·  Plan: Hx of necrotizing fasciitis tx at [facility] in 11/2021 with hospital course c/b bowel resection and ostomy formation. On ROS, no increased output from ostomy bag and site appears clean/dry. Patient received routine ostomy care and ostomy output was monitored.  - outpatient general surgery appointment for possible ostomy reversal.    # HTN (hypertension).   ·  Plan: Hx of HTN. Home medications include Labetalol 100 mg BID and Losartan 50mg Qqd  - c/w labetalol 100mg BID  - restart losartan if further BP control needed.    # Pulmonary embolism.   ·  Plan: History of bilateral pulmonary emboli in segmental and subsegmental branches on the R and subsegmental branches on the L, diagnosed on CT chest during ED visit on 4/7/23. Home meds: eliquis 5mg BID  - c/w eliquis 5mg BID    # Asthma.   ·  Plan: Hx of asthma. Well-controlled. Continued on home Ventolin Q24 PRN.      Patient was discharged to: home  New medications:   - c/w ertapenem 1g IV daily - last day: 6/09/23  - c/w gentamicin 400mg IV daily - last day: 06/05/23    Changes to old medications: none    Items to Follow up as outpatient:   - endocrine f/u  - weekly CBC, CMP, gent level to be faxed to ID,    Physical exam at time of discharge:   General: NAD, resting in bed  HEENT: MMM  Neck: supple  Cardiovascular: +S1/S2; RRR  Respiratory: CTA B/L; no W/R/R  Gastrointestinal: soft, ND, +BS, ostomy bag in place, clean well-appearing surrounding skin, no output in bag, hemorrhagic crust at umbilicus, non tender to manipulation  Back: tenderness to palpation of R paraspinal region of midback  : holley bag with clear yellow urine  Extremities: WWP; no edema, clubbing or cyanosis; diffusely tender/sensitive to palpation  Vascular: 2+ radial, DP/PT pulses B/L  Neurological: AAOx3; no focal deficits   #Discharge: do not delete    44 yo F with PMHx asthma, hx PE (Eliquis), DM, HTN, HLD, hx abdominal necrotizing fasciitis (s/p suprapubic catheter, ostomy placement in 11/2021) who presented to [facility] ED from home with worsening abdominal and suprapubic pain and drainage from prior suprapubic catheter insertion site, admitted for sepsis 2/2 UTI and catheter-site infection on gentamicin and ertapenem.    Problem List/Main Diagnoses (problem-based):     # Severe sepsis.   ·  Plan: Presented with worsening lower abd pain, drainage from suprapubic catheter site. On admission, met 2/4 SIRS (HR, WBC) with elevated lactate 5.3 (cleared after fluids to 1.7). CTAP largely unremarkable. On exam, current cath site with surrounding skin changes and old site open with visible underlying catheter. Infection source = UTI; UCx (5/22) growing carbapenem-resistant Klebsiella & E. Coli. Blood cultures (5/23) NGTD. S/p suprapubic catheter replacement by IR on 5/25 given complicated UTI/sepsis. ID and urology were consulted. Patient started on gentamicin 400mg q24h (weight based dosage, adjusted by trough levels) and started on ertapenem 1000mg q24h.   - c/w ertapenem 1g IV daily - last day: 6/09/23  - c/w gentamicin 400mg IV daily - last day: 06/05/23  - weekly CBC, CMP, gentamicin level as outpatient. Can fax to:  Dr Kauffman [phone]    # UTI (urinary tract infection).   ·  Plan: Admitted for severe sepsis 2/2 UTI. UA positive showing small LE, positive nitrites, >10 WBC. Prior urine cx growing carbapenem-resistant and ESBL Klebsiella. s/p 2L NS, gentamicin and ertapenem in ED. ID and urology following.    # Suprapubic/Abd pain.  Patient was started on oxybutynin 5mg TID PRN per urology for bladder spasms to trial. Received pain regimen of Tylenol 975mg q8hrs, Ibuprofen 200mg q6hrs, Oxycodone IR PRN (moderate 5mg q6h, severe 10mg q4h). Patient started on lidocaine patch for R midback paraspinal tenderness.    # DM (diabetes mellitus).   ·  Plan: Hx of DM. Last A1C 12.2% prior admission. Was discharged on May 3rd on Humulin U-500 100 units TID. Endocrinology consulted, titrating inpatient regimen daily. Patient was given Lantus 70u twice daily, Lispro 10 units with meals and bedtime with Lispro mISS with meals and at bedtime  - resume home humulin U-500 100 units TID on discharge  - f/u with endocrine outpatient    # Right ovarian cyst.   ·  Plan: CTAP in ED showed 4.5cm right ovarian cyst. Transabdominal pelvic US performed which demonstrated 3.7cm simple-appearing right ovarian cyst and no evidence of ovarian torsion. Patient refused endovaginal US which would have yielded more complete imaging. GYN consulted in ED, no acute interventions.    # History of creation of ostomy.   ·  Plan: Hx of necrotizing fasciitis tx at [facility] in 11/2021 with hospital course c/b bowel resection and ostomy formation. On ROS, no increased output from ostomy bag and site appears clean/dry. Patient received routine ostomy care and ostomy output was monitored.  - outpatient general surgery appointment for possible ostomy reversal.    # HTN (hypertension).   ·  Plan: Hx of HTN. Home medications include Labetalol 100 mg BID and Losartan 50mg Qqd  - c/w labetalol 100mg BID  - restart losartan if further BP control needed.    # Pulmonary embolism.   ·  Plan: History of bilateral pulmonary emboli in segmental and subsegmental branches on the R and subsegmental branches on the L, diagnosed on CT chest during ED visit on 4/7/23. Home meds: eliquis 5mg BID  - c/w eliquis 5mg BID    # Asthma.   ·  Plan: Hx of asthma. Well-controlled. Continued on home Ventolin Q24 PRN.    Patient was discharged to: home  New medications:   - c/w ertapenem 1g IV daily - last day: 6/09/23  - c/w gentamicin 400mg IV daily - last day: 06/05/23    Changes to old medications: none    Items to Follow up as outpatient:   - endocrine f/u  - weekly CBC, CMP, gent level to be faxed to ID,    Physical exam at time of discharge:   General: NAD, resting in bed  HEENT: MMM  Neck: supple  Cardiovascular: +S1/S2; RRR  Respiratory: CTA B/L; no W/R/R  Gastrointestinal: soft, ND, +BS, ostomy bag in place, clean well-appearing surrounding skin, no output in bag, hemorrhagic crust at umbilicus, non tender to manipulation  Back: tenderness to palpation of R paraspinal region of midback  : holley bag with clear yellow urine  Extremities: WWP; no edema, clubbing or cyanosis; diffusely tender/sensitive to palpation  Vascular: 2+ radial, DP/PT pulses B/L  Neurological: AAOx3; no focal deficits   #Discharge: do not delete    42 yo F with PMHx asthma, hx PE (Eliquis), DM, HTN, HLD, hx abdominal necrotizing fasciitis (s/p suprapubic catheter, ostomy placement in 11/2021) who presented to [facility] ED from home with worsening abdominal and suprapubic pain and drainage from prior suprapubic catheter insertion site, admitted for sepsis 2/2 UTI and catheter-site infection on gentamicin and ertapenem.    Problem List/Main Diagnoses (problem-based):     # Severe sepsis secondary to complicated UTI:   Presented with worsening lower abd pain, drainage from suprapubic catheter site. On admission, met 2/4 SIRS (HR, WBC) with elevated lactate 5.3 (cleared after fluids to 1.7). CTAP largely unremarkable. On exam, current cath site with surrounding skin changes and old site open with visible underlying catheter. Infection source = UTI; UCx (5/22) growing carbapenem-resistant Klebsiella & E. Coli. Blood cultures (5/23) NGTD. S/p suprapubic catheter replacement by IR on 5/25 given complicated UTI/sepsis. ID and urology were consulted. Patient started on gentamicin 400mg q24h (weight based dosage, adjusted by trough levels) and started on ertapenem 1000mg q24h.   Continue ertapenem 1g IV daily - last day: 6/09/23  Continue gentamicin 400mg IV daily - last day: 06/05/23  Obtain weekly CBC, CMP, gentamicin level as outpatient. Can fax to: Dr Kauffman [phone]    # Suprapubic/Abd pain: Patient was started on oxybutynin 5mg TID PRN per urology for bladder spasms to trial. Received pain regimen of Tylenol 975mg q8hrs, Ibuprofen 200mg q6hrs, Oxycodone IR PRN (moderate 5mg q6h, severe 10mg q4h). Patient started on lidocaine patch for R midback paraspinal tenderness.    # DM (diabetes mellitus): Hx of DM. Last A1C 12.2% prior admission. Was discharged on May 3rd on Humulin U-500 100 units TID. Endocrinology consulted, titrating inpatient regimen daily. Patient was given Lantus 70u twice daily, Lispro 10 units with meals and bedtime with Lispro mISS with meals and at bedtime  - ? resume home humulin U-500 100 units TID on discharge  - f/u with endocrine outpatient    # Right ovarian cyst: CTAP in ED showed 4.5cm right ovarian cyst. Transabdominal pelvic US performed which demonstrated 3.7cm simple-appearing right ovarian cyst and no evidence of ovarian torsion. Patient refused endovaginal US which would have yielded more complete imaging. GYN consulted in ED, no acute interventions.    # History of creation of ostomy: Hx of necrotizing fasciitis tx at [facility] [place] in 11/2021 with hospital course c/b bowel resection and ostomy formation. On ROS, no increased output from ostomy bag and site appears clean/dry. Patient received routine ostomy care and ostomy output was monitored.  - outpatient general surgery appointment for possible ostomy reversal.    # HTN (hypertension): Hx of HTN. Home medications include Labetalol 100 mg BID and Losartan 50mg Qqd  - c/w labetalol 100mg BID  - restart losartan if further BP control needed.    # Pulmonary embolism: History of bilateral pulmonary emboli in segmental and subsegmental branches on the R and subsegmental branches on the L, diagnosed on CT chest during ED visit on 4/7/23. Home meds: eliquis 5mg BID. Continued on Eliquis 5 mg BID.     # Asthma: Hx of asthma. Well-controlled. Continued on home Ventolin Q24 PRN.    Patient was discharged to: home  New medications:   - c/w ertapenem 1g IV daily - last day: 6/09/23  - c/w gentamicin 400mg IV daily - last day: 06/05/23    Changes to old medications: none    Items to Follow up as outpatient:   - endocrine f/u  - weekly CBC, CMP, gent level to be faxed to ID,    Physical exam at time of discharge:   General: NAD, resting in bed  HEENT: MMM  Neck: supple  Cardiovascular: +S1/S2; RRR  Respiratory: CTA B/L; no W/R/R  Gastrointestinal: soft, ND, +BS, ostomy bag in place, clean well-appearing surrounding skin, no output in bag, hemorrhagic crust at umbilicus, non tender to manipulation  Back: tenderness to palpation of R paraspinal region of midback  : holley bag with clear yellow urine  Extremities: WWP; no edema, clubbing or cyanosis; diffusely tender/sensitive to palpation  Vascular: 2+ radial, DP/PT pulses B/L  Neurological: AAOx3; no focal deficits   #Discharge: do not delete    42 yo F with PMHx asthma, hx PE (Eliquis), DM, HTN, HLD, hx abdominal necrotizing fasciitis (s/p suprapubic catheter, ostomy placement in 11/2021) who presented to [facility] ED from home with worsening abdominal and suprapubic pain and drainage from prior suprapubic catheter insertion site, admitted for sepsis 2/2 UTI and catheter-site infection on gentamicin and ertapenem.    Problem List/Main Diagnoses (problem-based):     # Severe sepsis secondary to complicated UTI:   Presented with worsening lower abd pain, drainage from suprapubic catheter site. On admission, met 2/4 SIRS (HR, WBC) with elevated lactate 5.3 (cleared after fluids to 1.7). CTAP largely unremarkable. On exam, current cath site with surrounding skin changes and old site open with visible underlying catheter. Infection source = UTI; UCx (5/22) growing carbapenem-resistant Klebsiella & E. Coli. Blood cultures (5/23) NGTD. S/p suprapubic catheter replacement by IR on 5/25 given complicated UTI/sepsis. ID and urology were consulted. Patient started on gentamicin 400mg q24h (weight based dosage, adjusted by trough levels) and started on ertapenem 1000mg q24h.   Continue ertapenem 1g IV daily - last day: 6/09/23  Continue gentamicin 400mg IV daily - last day: 06/05/23  Obtain weekly CBC, CMP, gentamicin level as outpatient. Can fax to: Dr Kauffman [phone]  Follow up outpatient with ID as advised.     # Suprapubic/Abd pain: Patient was started on oxybutynin 5mg TID PRN per urology for bladder spasms to trial. Received pain regimen of Tylenol 975mg q8hrs, Ibuprofen 200mg q6hrs, Oxycodone IR PRN (moderate 5mg q6h, severe 10mg q4h). Patient started on lidocaine patch for R midback paraspinal tenderness. Continue Oxybutynin, Tylenol, Ibuprofen, Lidocaine patches, and Oxycodone 5 mg [id number] for 3 days.     # DM (diabetes mellitus): Hx of DM. Last A1C 12.2% prior admission. Was discharged on May 3rd on Humulin U-500 100 units TID. Endocrinology consulted, titrating inpatient regimen daily. Patient was given Lantus 70u twice daily, Lispro 10 units with meals and bedtime with Lispro mISS with meals and at bedtime  - Per endocrine resume home humulin U-500 100 units TID on discharge  - Outpatient endocrine follow up.     # Right ovarian cyst: CTAP in ED showed 4.5cm right ovarian cyst. Transabdominal pelvic US performed which demonstrated 3.7cm simple-appearing right ovarian cyst and no evidence of ovarian torsion. Patient refused endovaginal US which would have yielded more complete imaging. GYN consulted in ED, no acute interventions.    # History of creation of ostomy: Hx of necrotizing fasciitis tx at [facility] in 11/2021 with hospital course c/b bowel resection and ostomy formation. On ROS, no increased output from ostomy bag and site appears clean/dry. Patient received routine ostomy care and ostomy output was monitored. Outpatient general surgery appointment for possible ostomy reversal.    # HTN (hypertension): Hx of HTN. Home medications include Labetalol 100 mg BID and Losartan 50mg Qd. BP stable on labetalol 100mg BID, plan to restart losartan if further BP control needed.    # Pulmonary embolism: History of bilateral pulmonary emboli in segmental and subsegmental branches on the R and subsegmental branches on the L, diagnosed on CT chest during ED visit on 4/7/23. Home meds: eliquis 5mg BID. Continued on Eliquis 5 mg BID.     # Asthma: Hx of asthma. Well-controlled. Continued on home Ventolin Q24 PRN.    Patient was discharged to: home  New medications:   - c/w ertapenem 1g IV daily - last day: 6/09/23  - c/w gentamicin 400mg IV daily - last day: 06/05/23    Changes to old medications: none    Items to Follow up as outpatient:   - Pain management, infectious disease, surgery, and endocrine f/u.   - weekly CBC, CMP, gent level to be faxed to ID.     Physical exam at time of discharge:   General: NAD, resting in bed  HEENT: MMM  Neck: supple  Cardiovascular: +S1/S2; RRR  Respiratory: CTA B/L; no W/R/R  Gastrointestinal: soft, ND, +BS, ostomy bag in place, clean well-appearing surrounding skin, no output in bag, clean dry umbilicus, tenderness to palpation but out of proportion with exam and patient appears comfortable despite exclamation.   Back: tenderness to palpation of R paraspinal region of midback  : Cook bag with clear yellow urine  Extremities: WWP; no edema, clubbing or cyanosis; diffusely tender/sensitive to palpation  Vascular: 2+ radial, DP/PT pulses B/L  Neurological: AAOx3; no focal deficits   #Discharge: do not delete    42 yo F with PMHx asthma, hx PE (Eliquis), DM, HTN, HLD, hx abdominal necrotizing fasciitis (s/p suprapubic catheter, ostomy placement in 11/2021) who presented to [facility] ED from home with worsening abdominal and suprapubic pain and drainage from prior suprapubic catheter insertion site, admitted for sepsis 2/2 UTI and catheter-site infection on gentamicin and ertapenem.    Problem List/Main Diagnoses (problem-based):     # Severe sepsis secondary to complicated UTI:   Presented with worsening lower abd pain, drainage from suprapubic catheter site. On admission, met 2/4 SIRS (HR, WBC) with elevated lactate 5.3 (cleared after fluids to 1.7). CTAP largely unremarkable. On exam, current cath site with surrounding skin changes and old site open with visible underlying catheter. Infection source = UTI; UCx (5/22) growing carbapenem-resistant Klebsiella & E. Coli. Blood cultures (5/23) NGTD. S/p suprapubic catheter replacement by IR on 5/25 given complicated UTI/sepsis. ID and urology were consulted. Patient started on gentamicin 400mg q24h (weight based dosage, adjusted by trough levels) and started on ertapenem 1000mg q24h.   Continue ertapenem 1g IV daily - last day: 6/09/23  Continue gentamicin 400mg IV daily - last day: 06/05/23  Obtain weekly CBC, CMP, gentamicin level as outpatient. Can fax to: Dr Kauffman [phone]  Follow up outpatient with ID as advised.     # Suprapubic/Abd pain: Patient was started on oxybutynin 5mg TID PRN per urology for bladder spasms to trial. Received pain regimen of Tylenol 975mg q8hrs, Ibuprofen 200mg q6hrs, Oxycodone IR PRN (moderate 5mg q6h, severe 10mg q4h). Patient started on lidocaine patch for R midback paraspinal tenderness. Continue Oxybutynin, Tylenol, Ibuprofen, Lidocaine patches, and Oxycodone 5 mg [id number] for 3 days.     # DM (diabetes mellitus): Hx of DM. Last A1C 12.2% prior admission. Was discharged on May 3rd on Humulin U-500 100 units TID. Endocrinology consulted, titrating inpatient regimen daily. Patient was given Lantus 70u twice daily, Lispro 10 units with meals and bedtime with Lispro mISS with meals and at bedtime  - Per endocrine resume home humulin U-500 100 units TID on discharge  - Outpatient endocrine follow up.     # Right ovarian cyst: CTAP in ED showed 4.5cm right ovarian cyst. Transabdominal pelvic US performed which demonstrated 3.7cm simple-appearing right ovarian cyst and no evidence of ovarian torsion. Patient refused endovaginal US which would have yielded more complete imaging. GYN consulted in ED, no acute interventions.    # History of creation of ostomy: Hx of necrotizing fasciitis tx at [facility] in 11/2021 with hospital course c/b bowel resection and ostomy formation. On ROS, no increased output from ostomy bag and site appears clean/dry. Patient received routine ostomy care and ostomy output was monitored. Outpatient general surgery appointment for possible ostomy reversal.    # HTN (hypertension): Hx of HTN. Home medications include Labetalol 100 mg BID and Losartan 50mg Qd. BP stable on labetalol 100mg BID, plan to restart losartan with PCP followup.    # Pulmonary embolism: History of bilateral pulmonary emboli in segmental and subsegmental branches on the R and subsegmental branches on the L, diagnosed on CT chest during ED visit on 4/7/23. Home meds: eliquis 5mg BID. Continued on Eliquis 5 mg BID.     # Asthma: Hx of asthma. Well-controlled. Continued on home Ventolin Q24 PRN.    Patient was discharged to: home  New medications:   - c/w ertapenem 1g IV daily - last day: 6/09/23  - c/w gentamicin 400mg IV daily - last day: 06/05/23    Changes to old medications: none    Items to Follow up as outpatient:   - Pain management, infectious disease, surgery, and endocrine f/u.   - weekly CBC, CMP, gent level to be faxed to ID.     Physical exam at time of discharge:   General: NAD, resting in bed  HEENT: MMM  Neck: supple  Cardiovascular: +S1/S2; RRR  Respiratory: CTA B/L; no W/R/R  Gastrointestinal: soft, ND, +BS, ostomy bag in place, clean well-appearing surrounding skin, no output in bag, clean dry umbilicus, tenderness to palpation but out of proportion with exam and patient appears comfortable despite exclamation.   Back: tenderness to palpation of R paraspinal region of midback  : Cook bag with clear yellow urine  Extremities: WWP; no edema, clubbing or cyanosis; diffusely tender/sensitive to palpation  Vascular: 2+ radial, DP/PT pulses B/L  Neurological: AAOx3; no focal deficits

## 2023-05-23 NOTE — CONSULT NOTE ADULT - TIME BILLING
Recurrent MDR bacteruria with possible CAUTI; symptoms may also be related to current ovulation and also suspect functional pain component. To f/u ucx 5/22 speciation/susceptibility to guide subsequent redosing of antibiotics.

## 2023-05-23 NOTE — H&P ADULT - NSHPLABSRESULTS_GEN_ALL_CORE
LABS:                         13.6   12.00 )-----------( 487      ( 22 May 2023 22:28 )             43.8         x   |  x   |  x   ----------------------------<  x   3.1<L>   |  x   |  x     Ca    9.2      22 May 2023 23:18  Mg     1.7         TPro  7.5  /  Alb  3.8  /  TBili  <0.2  /  DBili  x   /  AST  13  /  ALT  10  /  AlkPhos  155<H>      PT/INR - ( 22 May 2023 23:18 )   PT: 12.3 sec;   INR: 1.03          PTT - ( 22 May 2023 23:18 )  PTT:31.4 sec  Urinalysis Basic - ( 22 May 2023 23:18 )    Color: Yellow / Appearance: Clear / S.020 / pH: x  Gluc: x / Ketone: NEGATIVE  / Bili: Negative / Urobili: 0.2 E.U./dL   Blood: x / Protein: 30 mg/dL / Nitrite: POSITIVE   Leuk Esterase: Small / RBC: 5-10 /HPF / WBC > 10 /HPF   Sq Epi: x / Non Sq Epi: x / Bacteria: Present /HPF        Lactate, Blood: 1.7 mmol/L ( @ 06:59)  Lactate, Blood: 2.3 mmol/L ( @ 03:18)  Lactate, Blood: 2.2 mmol/L ( @ 00:39)  Lactate, Blood: 5.3 mmol/L ( @ 22:28)      RADIOLOGY, EKG & ADDITIONAL TESTS: Reviewed.    < from: CT Abdomen and Pelvis w/ IV Cont (23 @ 03:19) >  4.5 cm right ovarian cyst, new since 2023.  A tiny nonobstructive calculus in each kidney. No hydronephrosis.  Suprapubic pigtail catheter within collapsed urinary bladder.  Postoperative changes in the bowel abdominal wall with  fat-containing   parastomal hernia. No fluid collection.    < from: US Pelvis Complete (US Pelvis Complete .) (23 @ 07:12) >  Limited study-only transabdominal.   Patient refused endovaginal.  No demonstrated evidence of ovarian torsion. 3.7 cm simple appearing   right ovarian cyst..

## 2023-05-23 NOTE — H&P ADULT - ATTENDING COMMENTS
43 year old woman with asthma, recent PE (April 2023, on eliquis), Type 2 DM (on insulin), HTN, HLD, history of abdominal necrotizing fasciitis (2021 at [facility], s/p suprapubic cathter, colonic resection with ostomy creation),   recent admission ~1 month ago for pain around suprapubic catheter site.   Now presenting with increased pain around suprapubic catheter site.     # Severe sepsis 2/2 UTI - present on admission; received severe sepsis bundle (IV bolus, repeat lactate, IV abx).   # Likely Urinary tract infection - On last admission, equivocal for UTI, received short course of abx. On this admission, UA still positive. [ ] f/u UCx [ ] Continue gentamicin for now [ ] f/u ID consult recs [ ] IR consult for suprapubic catheter exchange.     # Pain around suprapubic catheter insertion site   - Urology consulted for pain around current suprapubic catheter site, reopening of old suprapubic catheter site (patient noted reopening of old suprapubic catheter site ~1 week ago. body of current suprapubic catheter visible through os of old suprapubic catheter site)  [ ] May need new suprapubic catheter site creation; f/u urology recs     # Type 2 DM, complicated by hyperglycemia - Lantus BID for now. Hold mealtime lispro while NPO.  # History of Pulmonary embolism - PE diagnosed in April 2023. heparin drip pending possible procedure. Resume eliquis if no procedures planned.   # Hypokalemia - K <3 on admission. replete K, Mag.    F/u   - per patient, wants to start following with [facility] Gen Surg for ostomy ; [ ] Appt for Gen Surg on dc   - per patient, wants to start following with [facility] urology: [ ] Appt for urology on dc   - f/u appt with PCP on dc. 43 year old woman with asthma, recent PE (April 2023, on eliquis), Type 2 DM (on insulin), HTN, HLD, history of abdominal necrotizing fasciitis (2021 at [facility], s/p suprapubic cathter, colonic resection with ostomy creation),   recent admission ~1 month ago for pain around suprapubic catheter site.   Now presenting with increased pain around suprapubic catheter site.     # Severe sepsis 2/2 UTI - present on admission; received severe sepsis bundle (IV bolus, repeat lactate, IV abx).   # Likely Urinary tract infection - On last admission, equivocal for UTI, received short course of abx. On this admission, UA still positive. [ ] f/u UCx [ ] Continue gentamicin for now [ ] f/u ID consult recs [ ] IR consult for suprapubic catheter exchange.     # Pain around suprapubic catheter insertion site   - Urology consulted for pain around current suprapubic catheter site, reopening of old suprapubic catheter site (patient noted reopening of old suprapubic catheter site ~1 week ago. body of current suprapubic catheter visible through os of old suprapubic catheter site)  [ ] May need new suprapubic catheter site creation; f/u urology recs     # Type 2 DM, complicated by hyperglycemia - Lantus BID for now. Hold mealtime lispro while NPO.  # History of Pulmonary embolism - PE diagnosed in April 2023. heparin drip pending possible procedure. Resume eliquis if no procedures planned.   # Hypokalemia - K <3 on admission. replete K, Mag.  # right ovarian cyst - per gyn recs, f/u outpatient for imaging in 6 months to assess for resolution     F/u   - per patient, wants to start following with [facility] Gen Surg for ostomy ; [ ] Appt for Gen Surg on dc   - per patient, wants to start following with [facility] urology: [ ] Appt for urology on dc   - f/u with gyn ( per Gyn note, [phone] for appt with gynecology ) - f/u imaging of right ovarian cyst in 6 months to check for resolution.   - f/u appt with PCP on dc.

## 2023-05-23 NOTE — H&P ADULT - PROBLEM SELECTOR PLAN 1
Px with worsening lower abd pain, drainage from suprapubic catheter site. Meeting 2/4 SIRS (HR, WBC) with elevated lactate 5.3 (cleared after fluids to 1.7). CTAP largely unremarkable. Likely source UTI given positive UA, hx of UTI, and prior urine cx growing carbapenem-resistant and ESBL Klebsiella. s/p 2L NS, Gentamicin in ED (did not get Vancomycin).     - Tylenol 650mg PO Q6 PRN for mild pain and fever  - F/U blood cx x2 (sent from ED)  - F/U urine cx from suprapubic catheter Px with worsening lower abd pain, drainage from suprapubic catheter site. Meeting 2/4 SIRS (HR, WBC) with elevated lactate 5.3 (cleared after fluids to 1.7). CTAP largely unremarkable. On exam, current cath site with surrounding skin changes and old site open with visible underlying catheter. Likely source UTI given positive UA, hx of UTI, and prior urine cx growing carbapenem-resistant and ESBL Klebsiella. s/p 2L NS, Gentamicin in ED (did not get Vancomycin).     - Tylenol 650mg PO Q6 PRN for mild pain and fever  - F/U blood cx x2 (sent from ED)  - F/U urine cx from suprapubic catheter Px with worsening lower abd pain, drainage from suprapubic catheter site. Meeting 2/4 SIRS (HR, WBC) with elevated lactate 5.3 (cleared after fluids to 1.7). CTAP largely unremarkable. On exam, current cath site with surrounding skin changes and old site open with visible underlying catheter. Likely source UTI given positive UA, hx of UTI, and prior urine cx growing carbapenem-resistant and ESBL Klebsiella. s/p 2L NS, Gentamicin in ED (did not get Vancomycin).     - Tylenol 650mg PO Q6 PRN for mild pain and fever  - F/U blood cx x2 (sent from ED)  - F/U urine cx from suprapubic catheter  - Continue Gentamicin for now pending further ID recommendations  - ID consulted

## 2023-05-23 NOTE — DISCHARGE NOTE PROVIDER - CARE PROVIDERS DIRECT ADDRESSES
,DirectAddress_Unknown,DirectAddress_Unknown,DirectAddress_Unknown ,DirectAddress_Unknown,DirectAddress_Unknown,DirectAddress_Unknown,DirectAddress_Unknown ,DirectAddress_Unknown,DirectAddress_Unknown,DirectAddress_Unknown,DirectAddress_Unknown,DirectAddress_Unknown

## 2023-05-23 NOTE — H&P ADULT - PROBLEM SELECTOR PLAN 7
Hx of HTN. Home medications include Labetalol 50mg Q24 and Losartan 50mg Q24.     - Holding anti-HTN i/s/o sepsis and BPs within goal

## 2023-05-23 NOTE — H&P ADULT - PROBLEM SELECTOR PLAN 2
Admitted for severe sepsis 2/2 UTI. UA positive showing small LE, Positive nitrites, >10 WBC. Prior urine cx growing carbapenem-resistant and ESBL Klebsiella. s/p 2L NS, Gentamicin in ED (did not get Vancomycin). Tx with Gentamicin x5d on previous admission. Hx of suprapubic catheter placed in 11/2021 at [facility] as complication of abdominal necrotizing fasciitis, last exchanged at [facility] on 3/24.     - F/U urine cx from suprapubic catheter  - Urology consulted in ED  - IR consulted for suprapubic catheter exchange Admitted for severe sepsis 2/2 UTI. UA positive showing small LE, Positive nitrites, >10 WBC. Prior urine cx growing carbapenem-resistant and ESBL Klebsiella. s/p 2L NS, Gentamicin in ED (did not get Vancomycin). Tx with Gentamicin x5d on previous admission. Hx of suprapubic catheter placed in 11/2021 at [facility] as complication of abdominal necrotizing fasciitis, last exchanged at [facility] on 3/24. Current catheter site with some surrounding skin changes and old catheter site now open with visible catheter underneath.     - F/U urine cx from suprapubic catheter  - Urology consulted in ED, recommending catheter exchange with new site formation   - IR consulted for suprapubic catheter exchange

## 2023-05-23 NOTE — DISCHARGE NOTE PROVIDER - NSDCCPCAREPLAN_GEN_ALL_CORE_FT
PRINCIPAL DISCHARGE DIAGNOSIS  Diagnosis: Sepsis due to urinary tract infection  Assessment and Plan of Treatment: You were diagnosed with sepsis which is your body's overactive and extreme response to an infection. Sepsis is amedical emergency. Without quick treatment, it can lead to tissue damage, organ failure, and even death. Your sepsis was due to having a urinary tract infection. They usually occur in the bladder or urethra, but more serious infections involve the kidney. A bladder infection may cause pelvic pain, increased urge to urinate, pain with urination, and blood in the urine. A kidney infection may cause back pain, nausea, vomiting, and fever. Common treatment is with antibiotics. Your urine cultures grew bacteria so we gave you antibiotics. You were evaluated by the infectious disease team and the urology team. Your suprapubic catheter was also exchanged by the interventional radiology team on May 25th. Please follow up with your Primary Care Physician within 2 weeks or if your symptoms come back.   - Continue taking gentamcin 400mg every 24 hours until June 5th  - Continue taking ertapenem 1g every 24 hours until June 9th  - You should continue to have labwork performed once a week to monitor your health while you are on these antibiotics  - For pain: take oxybutynin 5mg every 8 hours as needed, tylenol 975mg every 8 hours, and ibuprofen 400mg every 6 hours as needed with food, use lidocaine patches as needed      SECONDARY DISCHARGE DIAGNOSES  Diagnosis: Right ovarian cyst  Assessment and Plan of Treatment: You were found to have an ovarian cyst in your right ovary. A limited ultrasound was performed to evaluate the cyst. You refused to have the pelvic ultrasound that is inserted in the vagina which is able to more completely visualize the organs. There were no signs of twisting of the ovaries that can lead to pain.    Diagnosis: DM (diabetes mellitus)  Assessment and Plan of Treatment: You have a known history of diabetes mellitus prior to your admission. This condition results from blood sugar levels getting too high because your body is more resistant to insulin. Uncontrolled blood sugar levels can lead to kidney and heart damage, pain/numbness/paralysis in your hands and feet, and increased rates of infections.  To manage this you are on a medication called HUMULIN. It is important that you continue to take this medication when you are discharged so that you can continue to control your blood sugar levels. Additionally be sure to follow up with your primary care physician, endocrinologist, podiatrist, and ophthalmologist on a regular basis.     PRINCIPAL DISCHARGE DIAGNOSIS  Diagnosis: Sepsis due to urinary tract infection  Assessment and Plan of Treatment: You were diagnosed with sepsis which is your body's overactive and extreme response to an infection. Sepsis is amedical emergency. Without quick treatment, it can lead to tissue damage, organ failure, and even death. Your sepsis was due to having a urinary tract infection. They usually occur in the bladder or urethra, but more serious infections involve the kidney. A bladder infection may cause pelvic pain, increased urge to urinate, pain with urination, and blood in the urine. A kidney infection may cause back pain, nausea, vomiting, and fever. Common treatment is with antibiotics. Your urine cultures grew bacteria so we gave you antibiotics. You were evaluated by the infectious disease team and the urology team. Your suprapubic catheter was also exchanged by the interventional radiology team on May 25th. Please follow up with your Primary Care Physician within 2 weeks or if your symptoms come back.   - Continue taking gentamcin 400mg every 24 hours until June 5th  - Continue taking ertapenem 1g every 24 hours until June 9th  - You should continue to have labwork performed once a week to monitor your health while you are on these antibiotics  - For pain: take oxybutynin 5mg every 8 hours as needed, tylenol 975mg every 8 hours, and ibuprofen 400mg every 6 hours as needed with food, use lidocaine patches as needed  - If you pain is not controlled please follow up with your Primary Care Physician      SECONDARY DISCHARGE DIAGNOSES  Diagnosis: Right ovarian cyst  Assessment and Plan of Treatment: You were found to have an ovarian cyst in your right ovary. A limited ultrasound was performed to evaluate the cyst. You refused to have the pelvic ultrasound that is inserted in the vagina which is able to more completely visualize the organs. There were no signs of twisting of the ovaries that can lead to pain.    Diagnosis: DM (diabetes mellitus)  Assessment and Plan of Treatment: You have a known history of diabetes mellitus prior to your admission. This condition results from blood sugar levels getting too high because your body is more resistant to insulin. Uncontrolled blood sugar levels can lead to kidney and heart damage, pain/numbness/paralysis in your hands and feet, and increased rates of infections.  To manage this you are on a medication called HUMULIN. It is important that you continue to take this medication when you are discharged so that you can continue to control your blood sugar levels. Additionally be sure to follow up with your primary care physician, endocrinologist, podiatrist, and ophthalmologist on a regular basis.     PRINCIPAL DISCHARGE DIAGNOSIS  Diagnosis: Sepsis due to urinary tract infection  Assessment and Plan of Treatment: You were diagnosed with sepsis which is your body's overactive and extreme response to an infection. Sepsis is amedical emergency. Without quick treatment, it can lead to tissue damage, organ failure, and even death. Your sepsis was due to having a urinary tract infection. They usually occur in the bladder or urethra, but more serious infections involve the kidney. A bladder infection may cause pelvic pain, increased urge to urinate, pain with urination, and blood in the urine. A kidney infection may cause back pain, nausea, vomiting, and fever. Common treatment is with antibiotics. Your urine cultures grew bacteria so we gave you antibiotics. You were evaluated by the infectious disease team and the urology team. Your suprapubic catheter was also exchanged by the interventional radiology team on May 25th. Please follow up with your Primary Care Physician within 2 weeks or if your symptoms come back.   - Continue taking gentamcin 400mg every 24 hours until June 5th  - Continue taking ertapenem 1g every 24 hours until June 9th  - You should continue to have labwork performed once a week to monitor your health while you are on these antibiotics  - For pain: take oxybutynin 5mg every 8 hours as needed, tylenol 975mg every 8 hours, ibuprofen 400mg every 6 hours as needed with food, use lidocaine patches as needed, and Oxycodone 5 mg PO [id number].   - If you pain is not controlled please follow up with the referred Pain managment physician or your Primary Care Physician.      SECONDARY DISCHARGE DIAGNOSES  Diagnosis: Right ovarian cyst  Assessment and Plan of Treatment: You were found to have an ovarian cyst in your right ovary. A limited ultrasound was performed to evaluate the cyst. You refused to have the pelvic ultrasound that is inserted in the vagina which is able to more completely visualize the organs. There were no signs of twisting of the ovaries that can lead to pain.    Diagnosis: DM (diabetes mellitus)  Assessment and Plan of Treatment: You have a known history of diabetes mellitus prior to your admission. This condition results from blood sugar levels getting too high because your body is more resistant to insulin. Uncontrolled blood sugar levels can lead to kidney and heart damage, pain/numbness/paralysis in your hands and feet, and increased rates of infections.  To manage this you are on a medication called HUMULIN 100 units every 8 hours. Continue to monitor your blood sugars at home. It is important that you continue to take this medication when you are discharged so that you can continue to control your blood sugar levels. Additionally be sure to follow up with your primary care physician, endocrinologist, podiatrist, and ophthalmologist on a regular basis.     PRINCIPAL DISCHARGE DIAGNOSIS  Diagnosis: Sepsis due to urinary tract infection  Assessment and Plan of Treatment: You were diagnosed with sepsis which is your body's overactive and extreme response to an infection. Sepsis is amedical emergency. Without quick treatment, it can lead to tissue damage, organ failure, and even death. Your sepsis was due to having a urinary tract infection. They usually occur in the bladder or urethra, but more serious infections involve the kidney. A bladder infection may cause pelvic pain, increased urge to urinate, pain with urination, and blood in the urine. A kidney infection may cause back pain, nausea, vomiting, and fever. Common treatment is with antibiotics. Your urine cultures grew bacteria so we gave you antibiotics. You were evaluated by the infectious disease team and the urology team. Your suprapubic catheter was also exchanged by the interventional radiology team on May 25th. Please follow up with your Primary Care Physician within 2 weeks or if your symptoms come back.   - Continue taking gentamcin 400mg every 24 hours until June 5th  - Continue taking ertapenem 1g every 24 hours until June 9th  - You should continue to have labwork performed once a week to monitor your health while you are on these antibiotics  - For pain: take oxybutynin 5mg every 8 hours as needed, tylenol 975mg every 8 hours, ibuprofen 400mg every 6 hours as needed with food, use lidocaine patches as needed, and Oxycodone 5 mg PO [id number].   - If you pain is not controlled please follow up with the referred Pain managment physician or your Primary Care Physician.      SECONDARY DISCHARGE DIAGNOSES  Diagnosis: Right ovarian cyst  Assessment and Plan of Treatment: You were found to have an ovarian cyst in your right ovary. A limited ultrasound was performed to evaluate the cyst. You refused to have the pelvic ultrasound that is inserted in the vagina which is able to more completely visualize the organs. There were no signs of twisting of the ovaries that can lead to pain.    Diagnosis: DM (diabetes mellitus)  Assessment and Plan of Treatment: You have a known history of diabetes mellitus prior to your admission. This condition results from blood sugar levels getting too high because your body is more resistant to insulin. Uncontrolled blood sugar levels can lead to kidney and heart damage, pain/numbness/paralysis in your hands and feet, and increased rates of infections.  To manage this you are on a medication called HUMULIN 100 units every 8 hours. Continue to monitor your blood sugars at home. It is important that you continue to take this medication when you are discharged so that you can continue to control your blood sugar levels. Additionally be sure to follow up with your primary care physician, endocrinologist, podiatrist, and ophthalmologist on a regular basis.    Diagnosis: HTN (hypertension)  Assessment and Plan of Treatment: You have a history of high blood pressure, throughout hospitalization your blood pressure has been slightly elevated on Labetalol monotherapy. Please continue taking home doses of Labetolol and Losartan on discharge and follow up with your primary care doctor. If you notice dizziness, fatigue, or weakness follow up with a physician to assess for low blood pressures.     PRINCIPAL DISCHARGE DIAGNOSIS  Diagnosis: Sepsis due to urinary tract infection  Assessment and Plan of Treatment: You were diagnosed with sepsis which is your body's overactive and extreme response to an infection. Sepsis is amedical emergency. Without quick treatment, it can lead to tissue damage, organ failure, and even death. Your sepsis was due to having a urinary tract infection. They usually occur in the bladder or urethra, but more serious infections involve the kidney. A bladder infection may cause pelvic pain, increased urge to urinate, pain with urination, and blood in the urine. A kidney infection may cause back pain, nausea, vomiting, and fever. Common treatment is with antibiotics. Your urine cultures grew bacteria so we gave you antibiotics. You were evaluated by the infectious disease team and the urology team. Your suprapubic catheter was also exchanged by the interventional radiology team on May 25th. Please follow up with your Primary Care Physician within 2 weeks or if your symptoms come back.   - Continue taking gentamcin 400mg every 24 hours until June 5th  - Continue taking ertapenem 1g every 24 hours until June 9th  - You should continue to have labwork performed once a week to monitor your health while you are on these antibiotics  - For pain: take oxybutynin 5mg every 8 hours as needed, tylenol 975mg every 8 hours, ibuprofen 400mg every 6 hours as needed with food, use lidocaine patches as needed, and Oxycodone 5 mg PO [id number].   - If you pain is not controlled please follow up with the referred Pain managment physician or your Primary Care Physician.      SECONDARY DISCHARGE DIAGNOSES  Diagnosis: Right ovarian cyst  Assessment and Plan of Treatment: You were found to have an ovarian cyst in your right ovary. A limited ultrasound was performed to evaluate the cyst. You refused to have the pelvic ultrasound that is inserted in the vagina which is able to more completely visualize the organs. There were no signs of twisting of the ovaries that can lead to pain.    Diagnosis: DM (diabetes mellitus)  Assessment and Plan of Treatment: You have a known history of diabetes mellitus prior to your admission. This condition results from blood sugar levels getting too high because your body is more resistant to insulin. Uncontrolled blood sugar levels can lead to kidney and heart damage, pain/numbness/paralysis in your hands and feet, and increased rates of infections.  To manage this you are on a medication called HUMULIN 100 units every 8 hours. Continue to monitor your blood sugars at home. It is important that you continue to take this medication when you are discharged so that you can continue to control your blood sugar levels. Additionally be sure to follow up with your primary care physician, endocrinologist, podiatrist, and ophthalmologist on a regular basis.    Diagnosis: HTN (hypertension)  Assessment and Plan of Treatment: You have a history of high blood pressure, throughout hospitalization your blood pressure has been slightly elevated on Labetalol monotherapy. Please continue taking home doses of Labetolol on discharge and follow up with your primary care doctor, you were noted to have intermittent hypotension through hospitalization please follow up with your PCP to determine when to restart Losartan medication. If you notice dizziness, fatigue, or weakness follow up with a physician to assess for low blood pressures.

## 2023-05-23 NOTE — DISCHARGE NOTE PROVIDER - CARE PROVIDER_API CALL
Pelon Sánchez  INFECTIOUS DISEASE  [facility], [facility], [address] [place] [address]  Phone: [phone]  Fax: [phone]  Follow Up Time:     Casimiro Muller)  ColonRectal Surgery; Surgery  [address], [address]  [place] [address]  Phone: [phone]  Fax: [phone]  Established Patient  Follow Up Time:     Billy Martines)  Urology  [address] [place] [address]  Phone: [phone]  Fax: [phone]  Established Patient  Follow Up Time:    Pelon Sánchez  Internal Medicine  [facility], [facility] [place] [address]  Phone: [phone]  Fax: [phone]  Follow Up Time:     Casimiro Muller  Colon/Rectal Surgery  [address], [address]  [place] [address]  Phone: [phone]  Fax: [phone]  Established Patient  Follow Up Time:     Billy Martines  Urology  [address], [address]  [place] [address]  Phone: [phone]  Fax: [phone]  Established Patient  Follow Up Time:     Anoop Suh [facility]  Pain Medicine  [address] [facility], [address]  [place] [address]  Phone: [phone]  Fax: [phone]  Follow Up Time: 1 week   Pelon Sánchez  Internal Medicine  [facility], [address] [place] [address]  Phone: [phone]  Fax: [phone]  Follow Up Time:     Casimiro Muller  Colon/Rectal Surgery  [address], [address]  [place] [address]  Phone: [phone]  Fax: [phone]  Established Patient  Follow Up Time:     Billy Martines  Urology  [address], [address]  [place] [address]  Phone: [phone]  Fax: [phone]  Established Patient  Follow Up Time:     Anoop Suh [facility]  Pain Medicine  [address] [facility], [address]  [place] [address]  Phone: [phone]  Fax: [phone]  Follow Up Time: 1 week    Swetha Britt  Obstetrics and Gynecology  [address]  [place] [address]  Phone: [phone]  Fax: [phone]  Follow Up Time: 1 month

## 2023-05-23 NOTE — CONSULT NOTE ADULT - ASSESSMENT
42 yo F with PMHx asthma, hx PE (Eliquis), DM, HTN, HLD, hx abdominal necrotizing fasciitis (s/p suprapubic catheter, ostomy placement in 11/2021) px to [facility] ED from home with worsening abd/suprapubic pain and drainage from prior suprapubic catheter insertion site found to have      #            Team 1 will continue to follow. Thank you for the opportunity to participate in the care of this patient.  44 yo F with PMHx asthma, hx PE (Eliquis), DM, HTN, HLD, hx abdominal necrotizing fasciitis (s/p suprapubic catheter, ostomy placement in 11/2021) px to [facility] ED from home with worsening abd/suprapubic pain and drainage from prior suprapubic catheter insertion site found to have catheter induced urinary tract infection. ID consulted for antibiotic recommendation given patient's history of abx resistance.    Relevant data: leukocytosis (11.08), UA from 5/22 (+) for nitrites, bacteria, WBC, and RBC, lactate downtrending (on admission 5.3-->1.7 today), CT abd/pelvis showed suprapubic pigtail catheter within collapsed urinary bladder, R ovarian simple cyst, b/l non obstructive kidney stones, urine culture and blood culture pending    Recommendations:  - F/u urine and blood culture  - No need to redose Gentamicin until urine and blood cultures return     42 yo F with PMHx asthma, hx PE (Eliquis), DM, HTN, HLD, hx abdominal necrotizing fasciitis (s/p suprapubic catheter, ostomy placement in 11/2021) px to [facility] ED from home with worsening abd/suprapubic pain and drainage from prior suprapubic catheter insertion site found to have catheter induced urinary tract infection. ID consulted for antibiotic recommendation given patient's history of abx resistance.    Relevant data: leukocytosis (11.08), UA from 5/22 (+) for nitrites, bacteria, WBC, and RBC, lactate downtrending (on admission 5.3-->1.7 today), CT abd/pelvis showed suprapubic pigtail catheter within collapsed urinary bladder, R ovarian simple cyst, b/l non obstructive kidney stones, urine culture and blood culture pending    Recommendations:  - F/u urine and blood culture  - No need to redose Gentamicin today since dose already given earlier today. obtain speciation to guide antibiotic therapy   - would favor exchanging suprapubic urinary catheter given presentation

## 2023-05-23 NOTE — ED PROVIDER NOTE - NSICDXPASTMEDICALHX_GEN_ALL_CORE_FT
PAST MEDICAL HISTORY:  Abdominal hernia     Diabetes     Essential hypertension     Hyperlipidemia     Obesity     Pre-eclampsia     Pulmonary embolism

## 2023-05-24 LAB
ANION GAP SERPL CALC-SCNC: 11 MMOL/L — SIGNIFICANT CHANGE UP (ref 5–17)
ANION GAP SERPL CALC-SCNC: 13 MMOL/L — SIGNIFICANT CHANGE UP (ref 5–17)
ANISOCYTOSIS BLD QL: SIGNIFICANT CHANGE UP
APTT BLD: 27.6 SEC — SIGNIFICANT CHANGE UP (ref 27.5–35.5)
APTT BLD: 41.2 SEC — HIGH (ref 27.5–35.5)
APTT BLD: 65.3 SEC — HIGH (ref 27.5–35.5)
BASOPHILS # BLD AUTO: 0 K/UL — SIGNIFICANT CHANGE UP (ref 0–0.2)
BASOPHILS NFR BLD AUTO: 0 % — SIGNIFICANT CHANGE UP (ref 0–2)
BUN SERPL-MCNC: 8 MG/DL — SIGNIFICANT CHANGE UP (ref 7–23)
BUN SERPL-MCNC: 8 MG/DL — SIGNIFICANT CHANGE UP (ref 7–23)
CALCIUM SERPL-MCNC: 8.3 MG/DL — LOW (ref 8.4–10.5)
CALCIUM SERPL-MCNC: 9 MG/DL — SIGNIFICANT CHANGE UP (ref 8.4–10.5)
CHLORIDE SERPL-SCNC: 100 MMOL/L — SIGNIFICANT CHANGE UP (ref 96–108)
CHLORIDE SERPL-SCNC: 99 MMOL/L — SIGNIFICANT CHANGE UP (ref 96–108)
CO2 SERPL-SCNC: 23 MMOL/L — SIGNIFICANT CHANGE UP (ref 22–31)
CO2 SERPL-SCNC: 25 MMOL/L — SIGNIFICANT CHANGE UP (ref 22–31)
CREAT SERPL-MCNC: 0.59 MG/DL — SIGNIFICANT CHANGE UP (ref 0.5–1.3)
CREAT SERPL-MCNC: 0.65 MG/DL — SIGNIFICANT CHANGE UP (ref 0.5–1.3)
EGFR: 112 ML/MIN/1.73M2 — SIGNIFICANT CHANGE UP
EGFR: 115 ML/MIN/1.73M2 — SIGNIFICANT CHANGE UP
EOSINOPHIL # BLD AUTO: 0 K/UL — SIGNIFICANT CHANGE UP (ref 0–0.5)
EOSINOPHIL NFR BLD AUTO: 0 % — SIGNIFICANT CHANGE UP (ref 0–6)
GIANT PLATELETS BLD QL SMEAR: PRESENT — SIGNIFICANT CHANGE UP
GLUCOSE BLDC GLUCOMTR-MCNC: 124 MG/DL — HIGH (ref 70–99)
GLUCOSE BLDC GLUCOMTR-MCNC: 205 MG/DL — HIGH (ref 70–99)
GLUCOSE BLDC GLUCOMTR-MCNC: 213 MG/DL — HIGH (ref 70–99)
GLUCOSE BLDC GLUCOMTR-MCNC: 299 MG/DL — HIGH (ref 70–99)
GLUCOSE BLDC GLUCOMTR-MCNC: 385 MG/DL — HIGH (ref 70–99)
GLUCOSE BLDC GLUCOMTR-MCNC: 403 MG/DL — HIGH (ref 70–99)
GLUCOSE BLDC GLUCOMTR-MCNC: 90 MG/DL — SIGNIFICANT CHANGE UP (ref 70–99)
GLUCOSE SERPL-MCNC: 243 MG/DL — HIGH (ref 70–99)
GLUCOSE SERPL-MCNC: 61 MG/DL — LOW (ref 70–99)
HCT VFR BLD CALC: 39.2 % — SIGNIFICANT CHANGE UP (ref 34.5–45)
HGB BLD-MCNC: 12 G/DL — SIGNIFICANT CHANGE UP (ref 11.5–15.5)
LYMPHOCYTES # BLD AUTO: 42.1 % — SIGNIFICANT CHANGE UP (ref 13–44)
LYMPHOCYTES # BLD AUTO: 5 K/UL — HIGH (ref 1–3.3)
MAGNESIUM SERPL-MCNC: 1.7 MG/DL — SIGNIFICANT CHANGE UP (ref 1.6–2.6)
MAGNESIUM SERPL-MCNC: 1.7 MG/DL — SIGNIFICANT CHANGE UP (ref 1.6–2.6)
MANUAL SMEAR VERIFICATION: SIGNIFICANT CHANGE UP
MCHC RBC-ENTMCNC: 24.1 PG — LOW (ref 27–34)
MCHC RBC-ENTMCNC: 30.6 GM/DL — LOW (ref 32–36)
MCV RBC AUTO: 78.7 FL — LOW (ref 80–100)
MICROCYTES BLD QL: SIGNIFICANT CHANGE UP
MONOCYTES # BLD AUTO: 0.31 K/UL — SIGNIFICANT CHANGE UP (ref 0–0.9)
MONOCYTES NFR BLD AUTO: 2.6 % — SIGNIFICANT CHANGE UP (ref 2–14)
NEUTROPHILS # BLD AUTO: 6.56 K/UL — SIGNIFICANT CHANGE UP (ref 1.8–7.4)
NEUTROPHILS NFR BLD AUTO: 55.3 % — SIGNIFICANT CHANGE UP (ref 43–77)
OVALOCYTES BLD QL SMEAR: SLIGHT — SIGNIFICANT CHANGE UP
PHOSPHATE SERPL-MCNC: 2 MG/DL — LOW (ref 2.5–4.5)
PHOSPHATE SERPL-MCNC: 2.4 MG/DL — LOW (ref 2.5–4.5)
PLAT MORPH BLD: ABNORMAL
PLATELET # BLD AUTO: 501 K/UL — HIGH (ref 150–400)
POIKILOCYTOSIS BLD QL AUTO: SLIGHT — SIGNIFICANT CHANGE UP
POLYCHROMASIA BLD QL SMEAR: SLIGHT — SIGNIFICANT CHANGE UP
POTASSIUM SERPL-MCNC: 3.2 MMOL/L — LOW (ref 3.5–5.3)
POTASSIUM SERPL-MCNC: 3.4 MMOL/L — LOW (ref 3.5–5.3)
POTASSIUM SERPL-SCNC: 3.2 MMOL/L — LOW (ref 3.5–5.3)
POTASSIUM SERPL-SCNC: 3.4 MMOL/L — LOW (ref 3.5–5.3)
RBC # BLD: 4.98 M/UL — SIGNIFICANT CHANGE UP (ref 3.8–5.2)
RBC # FLD: 15.7 % — HIGH (ref 10.3–14.5)
RBC BLD AUTO: ABNORMAL
SODIUM SERPL-SCNC: 134 MMOL/L — LOW (ref 135–145)
SODIUM SERPL-SCNC: 137 MMOL/L — SIGNIFICANT CHANGE UP (ref 135–145)
SPHEROCYTES BLD QL SMEAR: SLIGHT — SIGNIFICANT CHANGE UP
WBC # BLD: 11.87 K/UL — HIGH (ref 3.8–10.5)
WBC # FLD AUTO: 11.87 K/UL — HIGH (ref 3.8–10.5)

## 2023-05-24 PROCEDURE — 99232 SBSQ HOSP IP/OBS MODERATE 35: CPT | Mod: GC

## 2023-05-24 PROCEDURE — 36000 PLACE NEEDLE IN VEIN: CPT

## 2023-05-24 PROCEDURE — 99232 SBSQ HOSP IP/OBS MODERATE 35: CPT

## 2023-05-24 PROCEDURE — 76937 US GUIDE VASCULAR ACCESS: CPT | Mod: 26

## 2023-05-24 RX ORDER — ACETAMINOPHEN 500 MG
975 TABLET ORAL EVERY 8 HOURS
Refills: 0 | Status: DISCONTINUED | OUTPATIENT
Start: 2023-05-24 | End: 2023-06-01

## 2023-05-24 RX ORDER — LABETALOL HCL 100 MG
100 TABLET ORAL
Refills: 0 | Status: DISCONTINUED | OUTPATIENT
Start: 2023-05-24 | End: 2023-06-01

## 2023-05-24 RX ORDER — POTASSIUM PHOSPHATE, MONOBASIC POTASSIUM PHOSPHATE, DIBASIC 236; 224 MG/ML; MG/ML
30 INJECTION, SOLUTION INTRAVENOUS ONCE
Refills: 0 | Status: COMPLETED | OUTPATIENT
Start: 2023-05-24 | End: 2023-05-24

## 2023-05-24 RX ORDER — IBUPROFEN 200 MG
200 TABLET ORAL EVERY 6 HOURS
Refills: 0 | Status: DISCONTINUED | OUTPATIENT
Start: 2023-05-24 | End: 2023-05-28

## 2023-05-24 RX ORDER — ONDANSETRON 8 MG/1
4 TABLET, FILM COATED ORAL ONCE
Refills: 0 | Status: COMPLETED | OUTPATIENT
Start: 2023-05-24 | End: 2023-05-24

## 2023-05-24 RX ORDER — OXYCODONE HYDROCHLORIDE 5 MG/1
5 TABLET ORAL EVERY 6 HOURS
Refills: 0 | Status: DISCONTINUED | OUTPATIENT
Start: 2023-05-24 | End: 2023-05-24

## 2023-05-24 RX ORDER — GENTAMICIN SULFATE 40 MG/ML
400 VIAL (ML) INJECTION ONCE
Refills: 0 | Status: COMPLETED | OUTPATIENT
Start: 2023-05-24 | End: 2023-05-24

## 2023-05-24 RX ORDER — POTASSIUM CHLORIDE 20 MEQ
20 PACKET (EA) ORAL
Refills: 0 | Status: DISCONTINUED | OUTPATIENT
Start: 2023-05-24 | End: 2023-05-24

## 2023-05-24 RX ORDER — HEPARIN SODIUM 5000 [USP'U]/ML
1800 INJECTION INTRAVENOUS; SUBCUTANEOUS
Qty: 25000 | Refills: 0 | Status: DISCONTINUED | OUTPATIENT
Start: 2023-05-24 | End: 2023-05-25

## 2023-05-24 RX ORDER — OXYCODONE HYDROCHLORIDE 5 MG/1
10 TABLET ORAL EVERY 4 HOURS
Refills: 0 | Status: DISCONTINUED | OUTPATIENT
Start: 2023-05-24 | End: 2023-05-30

## 2023-05-24 RX ORDER — POTASSIUM CHLORIDE 20 MEQ
20 PACKET (EA) ORAL
Refills: 0 | Status: COMPLETED | OUTPATIENT
Start: 2023-05-24 | End: 2023-05-24

## 2023-05-24 RX ORDER — OXYCODONE HYDROCHLORIDE 5 MG/1
5 TABLET ORAL ONCE
Refills: 0 | Status: DISCONTINUED | OUTPATIENT
Start: 2023-05-24 | End: 2023-05-24

## 2023-05-24 RX ORDER — OXYCODONE HYDROCHLORIDE 5 MG/1
5 TABLET ORAL EVERY 6 HOURS
Refills: 0 | Status: DISCONTINUED | OUTPATIENT
Start: 2023-05-24 | End: 2023-05-30

## 2023-05-24 RX ORDER — IBUPROFEN 200 MG
400 TABLET ORAL EVERY 6 HOURS
Refills: 0 | Status: DISCONTINUED | OUTPATIENT
Start: 2023-05-24 | End: 2023-05-24

## 2023-05-24 RX ORDER — INSULIN GLARGINE 100 [IU]/ML
20 INJECTION, SOLUTION SUBCUTANEOUS AT BEDTIME
Refills: 0 | Status: DISCONTINUED | OUTPATIENT
Start: 2023-05-24 | End: 2023-05-25

## 2023-05-24 RX ORDER — MAGNESIUM SULFATE 500 MG/ML
2 VIAL (ML) INJECTION ONCE
Refills: 0 | Status: COMPLETED | OUTPATIENT
Start: 2023-05-24 | End: 2023-05-24

## 2023-05-24 RX ORDER — HEPARIN SODIUM 5000 [USP'U]/ML
1600 INJECTION INTRAVENOUS; SUBCUTANEOUS
Qty: 25000 | Refills: 0 | Status: DISCONTINUED | OUTPATIENT
Start: 2023-05-24 | End: 2023-05-24

## 2023-05-24 RX ORDER — GENTAMICIN SULFATE 40 MG/ML
500 VIAL (ML) INJECTION EVERY 24 HOURS
Refills: 0 | Status: DISCONTINUED | OUTPATIENT
Start: 2023-05-24 | End: 2023-05-24

## 2023-05-24 RX ADMIN — Medication 6: at 22:51

## 2023-05-24 RX ADMIN — OXYCODONE HYDROCHLORIDE 10 MILLIGRAM(S): 5 TABLET ORAL at 20:35

## 2023-05-24 RX ADMIN — Medication 200 MILLIGRAM(S): at 12:40

## 2023-05-24 RX ADMIN — OXYCODONE HYDROCHLORIDE 10 MILLIGRAM(S): 5 TABLET ORAL at 14:32

## 2023-05-24 RX ADMIN — ONDANSETRON 4 MILLIGRAM(S): 8 TABLET, FILM COATED ORAL at 14:43

## 2023-05-24 RX ADMIN — Medication 975 MILLIGRAM(S): at 14:43

## 2023-05-24 RX ADMIN — ALBUTEROL 1 PUFF(S): 90 AEROSOL, METERED ORAL at 09:46

## 2023-05-24 RX ADMIN — Medication 100 MILLIGRAM(S): at 12:48

## 2023-05-24 RX ADMIN — HEPARIN SODIUM 12 UNIT(S)/HR: 5000 INJECTION INTRAVENOUS; SUBCUTANEOUS at 03:20

## 2023-05-24 RX ADMIN — OXYCODONE HYDROCHLORIDE 5 MILLIGRAM(S): 5 TABLET ORAL at 10:01

## 2023-05-24 RX ADMIN — HEPARIN SODIUM 16 UNIT(S)/HR: 5000 INJECTION INTRAVENOUS; SUBCUTANEOUS at 09:06

## 2023-05-24 RX ADMIN — Medication 975 MILLIGRAM(S): at 23:53

## 2023-05-24 RX ADMIN — Medication 20 MILLIEQUIVALENT(S): at 18:07

## 2023-05-24 RX ADMIN — Medication 975 MILLIGRAM(S): at 22:53

## 2023-05-24 RX ADMIN — Medication 20 MILLIEQUIVALENT(S): at 12:40

## 2023-05-24 RX ADMIN — OXYCODONE HYDROCHLORIDE 5 MILLIGRAM(S): 5 TABLET ORAL at 10:40

## 2023-05-24 RX ADMIN — OXYCODONE HYDROCHLORIDE 5 MILLIGRAM(S): 5 TABLET ORAL at 09:45

## 2023-05-24 RX ADMIN — HEPARIN SODIUM 16 UNIT(S)/HR: 5000 INJECTION INTRAVENOUS; SUBCUTANEOUS at 12:50

## 2023-05-24 RX ADMIN — Medication 25 GRAM(S): at 19:06

## 2023-05-24 RX ADMIN — Medication 20 MILLIEQUIVALENT(S): at 14:44

## 2023-05-24 RX ADMIN — Medication 200 MILLIGRAM(S): at 18:07

## 2023-05-24 RX ADMIN — Medication 20 MILLIEQUIVALENT(S): at 22:52

## 2023-05-24 RX ADMIN — HEPARIN SODIUM 18 UNIT(S)/HR: 5000 INJECTION INTRAVENOUS; SUBCUTANEOUS at 17:09

## 2023-05-24 RX ADMIN — POTASSIUM PHOSPHATE, MONOBASIC POTASSIUM PHOSPHATE, DIBASIC 83.33 MILLIMOLE(S): 236; 224 INJECTION, SOLUTION INTRAVENOUS at 22:52

## 2023-05-24 RX ADMIN — Medication 100 MILLIGRAM(S): at 22:52

## 2023-05-24 RX ADMIN — INSULIN GLARGINE 20 UNIT(S): 100 INJECTION, SOLUTION SUBCUTANEOUS at 22:52

## 2023-05-24 RX ADMIN — TRAMADOL HYDROCHLORIDE 25 MILLIGRAM(S): 50 TABLET ORAL at 00:38

## 2023-05-24 RX ADMIN — Medication 200 MILLIGRAM(S): at 13:39

## 2023-05-24 RX ADMIN — Medication 20 MILLIEQUIVALENT(S): at 19:59

## 2023-05-24 RX ADMIN — Medication 4: at 13:33

## 2023-05-24 RX ADMIN — Medication 10: at 18:24

## 2023-05-24 RX ADMIN — Medication 975 MILLIGRAM(S): at 15:40

## 2023-05-24 RX ADMIN — Medication 200 MILLIGRAM(S): at 01:22

## 2023-05-24 RX ADMIN — OXYCODONE HYDROCHLORIDE 10 MILLIGRAM(S): 5 TABLET ORAL at 13:34

## 2023-05-24 RX ADMIN — OXYCODONE HYDROCHLORIDE 10 MILLIGRAM(S): 5 TABLET ORAL at 20:00

## 2023-05-24 RX ADMIN — OXYCODONE HYDROCHLORIDE 5 MILLIGRAM(S): 5 TABLET ORAL at 09:03

## 2023-05-24 NOTE — PHYSICAL THERAPY INITIAL EVALUATION ADULT - GAIT DEVIATIONS NOTED, PT EVAL
decreased marce/increased time in double stance/decreased step length/decreased weight-shifting ability

## 2023-05-24 NOTE — PROGRESS NOTE ADULT - PROBLEM SELECTOR PLAN 2
Admitted for severe sepsis 2/2 UTI. UA positive showing small LE, Positive nitrites, >10 WBC. Prior urine cx growing carbapenem-resistant and ESBL Klebsiella. s/p 2L NS, Gentamicin in ED (did not get Vancomycin). Tx with Gentamicin x5d on previous admission. Hx of suprapubic catheter placed in 11/2021 at  as complication of abdominal necrotizing fasciitis, last exchanged at [facility] on 3/24. Current catheter site with some surrounding skin changes and old catheter site now open with visible catheter underneath.     - Plan as above

## 2023-05-24 NOTE — PROGRESS NOTE ADULT - PROBLEM SELECTOR PLAN 10
F: s/p 2L NS in ED  E: Replete PRN  N: Diet, DASH/TLC/CC  DVT ppx: Holding Eliquis i/s/o procedure, on hep drip  GI ppx: None  Bowel: None  Dispo: [facility]    PCP: Dr. Pelon Sánchez ([facility])     FULL CODE

## 2023-05-24 NOTE — PROGRESS NOTE ADULT - SUBJECTIVE AND OBJECTIVE BOX
INFECTIOUS DISEASES CONSULT FOLLOW-UP NOTE    INTERVAL HPI/OVERNIGHT EVENTS:      ROS:   Constitutional, eyes, ENT, cardiovascular, respiratory, gastrointestinal, genitourinary, integumentary, neurological, psychiatric and heme/lymph are otherwise negative other than noted above       ANTIBIOTICS/RELEVANT:    MEDICATIONS  (STANDING):  acetaminophen     Tablet .. 975 milliGRAM(s) Oral every 8 hours  albuterol    90 MICROgram(s) HFA Inhaler 1 Puff(s) Inhalation daily  dextrose 5%. 1000 milliLiter(s) (100 mL/Hr) IV Continuous <Continuous>  dextrose 5%. 1000 milliLiter(s) (50 mL/Hr) IV Continuous <Continuous>  dextrose 50% Injectable 25 Gram(s) IV Push once  dextrose 50% Injectable 12.5 Gram(s) IV Push once  dextrose 50% Injectable 25 Gram(s) IV Push once  glucagon  Injectable 1 milliGRAM(s) IntraMuscular once  heparin  Infusion 1600 Unit(s)/Hr (16 mL/Hr) IV Continuous <Continuous>  ibuprofen  Tablet. 200 milliGRAM(s) Oral every 6 hours  insulin glargine Injectable (LANTUS) 20 Unit(s) SubCutaneous at bedtime  insulin lispro (ADMELOG) corrective regimen sliding scale   SubCutaneous Before meals and at bedtime  labetalol 100 milliGRAM(s) Oral daily  potassium chloride  20 mEq/100 mL IVPB 20 milliEquivalent(s) IV Intermittent every 2 hours  sodium phosphate 30 milliMole(s)/500 mL IVPB 30 milliMole(s) IV Intermittent once    MEDICATIONS  (PRN):  dextrose Oral Gel 15 Gram(s) Oral once PRN Blood Glucose LESS THAN 70 milliGRAM(s)/deciliter  oxyCODONE    IR 5 milliGRAM(s) Oral every 6 hours PRN Moderate Pain (4 - 6)  oxyCODONE    IR 10 milliGRAM(s) Oral every 4 hours PRN Severe Pain (7 - 10)        Vital Signs Last 24 Hrs  T(C): 36.9 (24 May 2023 06:30), Max: 36.9 (24 May 2023 06:30)  T(F): 98.5 (24 May 2023 06:30), Max: 98.5 (24 May 2023 06:30)  HR: 104 (24 May 2023 06:30) (104 - 108)  BP: 142/90 (24 May 2023 06:30) (137/74 - 142/90)  BP(mean): --  RR: 19 (24 May 2023 06:30) (19 - 19)  SpO2: 99% (24 May 2023 06:30) (98% - 99%)    Parameters below as of 23 May 2023 21:20  Patient On (Oxygen Delivery Method): room air        23 @ 07:01  -  23 @ 07:00  --------------------------------------------------------  IN: 0 mL / OUT: 450 mL / NET: -450 mL      PHYSICAL EXAM:  Constitutional: alert, NAD  Eyes: the sclera and conjunctiva were normal.   ENT: the ears and nose were normal in appearance.   Neck: the appearance of the neck was normal and the neck was supple.   Pulmonary: no respiratory distress and lungs were clear to auscultation bilaterally.   Heart: heart rate was normal and rhythm regular, normal S1 and S2  Vascular:. there was no peripheral edema  Abdomen: normal bowel sounds, soft, non-tender  Neurological: no focal deficits.   Psychiatric: the affect was normal        LABS:                        12.0   11.87 )-----------( 501      ( 24 May 2023 05:30 )             39.2     05-24    134<L>  |  100  |  8   ----------------------------<  61<L>  3.2<L>   |  23  |  0.59    Ca    9.0      24 May 2023 05:30  Phos  2.0     0524  Mg     1.7     05-24    TPro  7.5  /  Alb  3.8  /  TBili  <0.2  /  DBili  x   /  AST  13  /  ALT  10  /  AlkPhos  155<H>      PT/INR - ( 23 May 2023 12:12 )   PT: 12.8 sec;   INR: 1.07          PTT - ( 24 May 2023 05:30 )  PTT:27.6 sec  Urinalysis Basic - ( 22 May 2023 23:18 )    Color: Yellow / Appearance: Clear / S.020 / pH: x  Gluc: x / Ketone: NEGATIVE  / Bili: Negative / Urobili: 0.2 E.U./dL   Blood: x / Protein: 30 mg/dL / Nitrite: POSITIVE   Leuk Esterase: Small / RBC: 5-10 /HPF / WBC > 10 /HPF   Sq Epi: x / Non Sq Epi: x / Bacteria: Present /HPF        MICROBIOLOGY:      RADIOLOGY & ADDITIONAL STUDIES:  Reviewed INFECTIOUS DISEASES CONSULT FOLLOW-UP NOTE    INTERVAL HPI/OVERNIGHT EVENTS: No acute overnight events    Subjective: Patient seen and examined at bedside. States the abdominal pain is the same as yesterday and notes that there is increased sediment in her urinary catheter tubing and bag. Patient says the suprapubic catheter site continues to have dark yellow/gray discharge. Denies fevers, chills, cough, SOB, abdominal pain, diarrhea, hematuria.    ANTIBIOTICS/RELEVANT:    MEDICATIONS  (STANDING):  acetaminophen     Tablet .. 975 milliGRAM(s) Oral every 8 hours  albuterol    90 MICROgram(s) HFA Inhaler 1 Puff(s) Inhalation daily  dextrose 5%. 1000 milliLiter(s) (100 mL/Hr) IV Continuous <Continuous>  dextrose 5%. 1000 milliLiter(s) (50 mL/Hr) IV Continuous <Continuous>  dextrose 50% Injectable 25 Gram(s) IV Push once  dextrose 50% Injectable 12.5 Gram(s) IV Push once  dextrose 50% Injectable 25 Gram(s) IV Push once  gentamicin   IVPB 500 milliGRAM(s) IV Intermittent every 24 hours  glucagon  Injectable 1 milliGRAM(s) IntraMuscular once  heparin  Infusion 1600 Unit(s)/Hr (16 mL/Hr) IV Continuous <Continuous>  ibuprofen  Tablet. 200 milliGRAM(s) Oral every 6 hours  insulin glargine Injectable (LANTUS) 20 Unit(s) SubCutaneous at bedtime  insulin lispro (ADMELOG) corrective regimen sliding scale   SubCutaneous Before meals and at bedtime  labetalol 100 milliGRAM(s) Oral daily  potassium chloride  20 mEq/100 mL IVPB 20 milliEquivalent(s) IV Intermittent every 2 hours  sodium phosphate 30 milliMole(s)/500 mL IVPB 30 milliMole(s) IV Intermittent once    MEDICATIONS  (PRN):  dextrose Oral Gel 15 Gram(s) Oral once PRN Blood Glucose LESS THAN 70 milliGRAM(s)/deciliter  oxyCODONE    IR 5 milliGRAM(s) Oral every 6 hours PRN Moderate Pain (4 - 6)  oxyCODONE    IR 10 milliGRAM(s) Oral every 4 hours PRN Severe Pain (7 - 10)        Vital Signs Last 24 Hrs  T(C): 36.9 (24 May 2023 06:30), Max: 36.9 (24 May 2023 06:30)  T(F): 98.5 (24 May 2023 06:30), Max: 98.5 (24 May 2023 06:30)  HR: 104 (24 May 2023 06:30) (104 - 108)  BP: 142/90 (24 May 2023 06:30) (137/74 - 142/90)  BP(mean): --  RR: 19 (24 May 2023 06:30) (19 - 19)  SpO2: 99% (24 May 2023 06:30) (98% - 99%)    Parameters below as of 23 May 2023 21:20  Patient On (Oxygen Delivery Method): room air        23 @ 07:01  -  23 @ 07:00  --------------------------------------------------------  IN: 0 mL / OUT: 450 mL / NET: -450 mL        PHYSICAL EXAM  Constitutional: alert, NAD  Eyes: the sclera and conjunctiva were normal.   ENT: the ears and nose were normal in appearance.   Neck: the appearance of the neck was normal and the neck was supple.   Pulmonary: no respiratory distress and lungs CTA bilaterally.   Heart: heart rate was normal and rhythm regular, normal S1 and S2  Vascular: no peripheral edema  Abdomen: obese, most recent suprapubic catheter site with surrounding erythema; patient refused all other abdominal exam due to extreme pain to palpation  Neurological: no focal deficits  Psychiatric: the affect was normal      LABS:                        12.0   11.87 )-----------( 501      ( 24 May 2023 05:30 )             39.2     05    134<L>  |  100  |  8   ----------------------------<  61<L>  3.2<L>   |  23  |  0.59    Ca    9.0      24 May 2023 05:30  Phos  2.0     05  Mg     1.7         TPro  7.5  /  Alb  3.8  /  TBili  <0.2  /  DBili  x   /  AST  13  /  ALT  10  /  AlkPhos  155<H>  05    PT/INR - ( 23 May 2023 12:12 )   PT: 12.8 sec;   INR: 1.07          PTT - ( 24 May 2023 05:30 )  PTT:27.6 sec  Urinalysis Basic - ( 22 May 2023 23:18 )    Color: Yellow / Appearance: Clear / S.020 / pH: x  Gluc: x / Ketone: NEGATIVE  / Bili: Negative / Urobili: 0.2 E.U./dL   Blood: x / Protein: 30 mg/dL / Nitrite: POSITIVE   Leuk Esterase: Small / RBC: 5-10 /HPF / WBC > 10 /HPF   Sq Epi: x / Non Sq Epi: x / Bacteria: Present /HPF        MICROBIOLOGY:    Culture - Blood (collected 23 May 2023 01:00)  Source: .Blood Blood  Preliminary Report (24 May 2023 03:00):    No growth at 1 day.    Culture - Blood (collected 23 May 2023 01:00)  Source: .Blood Blood  Preliminary Report (24 May 2023 03:00):    No growth at 1 day.    Culture - Urine (collected 22 May 2023 23:18)  Source: Suprapubic None  Preliminary Report (24 May 2023 08:43):    >100,000 CFU/ml Lactose Fermenting  Gram Negative Rods    Identification and susceptibility to follow.    Urinalysis with Rflx Culture (collected 22 May 2023 23:18)        RADIOLOGY & ADDITIONAL STUDIES: No new imaging

## 2023-05-24 NOTE — PROGRESS NOTE ADULT - PROBLEM SELECTOR PLAN 8
CTPE on 4/4 showing bilateral PEs. Home regimen includes Eliquis 5mg Q12.    - Holding Eliquis pending possible suprapubic catheter exchange procedure  - On hep drip, monitor PTTs

## 2023-05-24 NOTE — PROGRESS NOTE ADULT - ATTENDING COMMENTS
date of service 5/24/23  seen and examined w AM teaching team. Pt reports pain that improved w adding oxycodone this AM. Reports drainage and irritation from prior > current suprapubic catheter site  f/u ID recs  discuss whether IR vs Urology to replace suprapubic catheter  PT eval

## 2023-05-24 NOTE — PROGRESS NOTE ADULT - SUBJECTIVE AND OBJECTIVE BOX
UROLOGY PROGRESS NOTE    SUBJECTIVE: Patient seen and examined bedside. Patient reports continued moderate right sided abdominal pain, most severe at the SPT site. Has had some nausea but is tolerating solids. She was able to urinate transurethrally but has severe generalized abdominal pain which she says limits her ability to do so. Denies fevers/chills.    heparin  Infusion 1800 Unit(s)/Hr IV Continuous <Continuous>  labetalol 100 milliGRAM(s) Oral two times a day      Vital Signs Last 24 Hrs  T(C): 36.7 (24 May 2023 17:10), Max: 36.9 (24 May 2023 06:30)  T(F): 98.1 (24 May 2023 17:10), Max: 98.5 (24 May 2023 06:30)  HR: 96 (24 May 2023 17:10) (96 - 108)  BP: 122/74 (24 May 2023 17:10) (122/74 - 172/92)  BP(mean): 90 (24 May 2023 17:10) (90 - 125)  RR: 17 (24 May 2023 17:10) (17 - 20)  SpO2: 97% (24 May 2023 17:10) (97% - 99%)    Parameters below as of 24 May 2023 17:10  Patient On (Oxygen Delivery Method): room air      I&O's Detail    23 May 2023 07:  -  24 May 2023 07:00  --------------------------------------------------------  IN:  Total IN: 0 mL    OUT:    Indwelling Catheter - Suprapubic (mL): 450 mL  Total OUT: 450 mL    Total NET: -450 mL      24 May 2023 07:01  -  24 May 2023 19:39  --------------------------------------------------------  IN:    Heparin: 48 mL  Total IN: 48 mL    OUT:    Indwelling Catheter - Suprapubic (mL): 700 mL  Total OUT: 700 mL    Total NET: -652 mL          PHYSICAL EXAM    General: NAD, resting comfortably in bed  C/V: NSR  Pulm: Nonlabored breathing, no respiratory distress on room air  Abd: obese abdomen, soft, diffuse right abdominal tenderness - particularly around SPT site, non distended. SPT site is erythematous but without drainage.   : Suprapubic catheter draining clear yellow urine  Extrem: WWP, no edema, SCDs in place        LABS:                        12.0   11.87 )-----------( 501      ( 24 May 2023 05:30 )             39.2         137  |  99  |  8   ----------------------------<  243<H>  3.4<L>   |  25  |  0.65    Ca    8.3<L>      24 May 2023 15:05  Phos  2.4       Mg     1.7         TPro  7.5  /  Alb  3.8  /  TBili  <0.2  /  DBili  x   /  AST  13  /  ALT  10  /  AlkPhos  155<H>      PT/INR - ( 23 May 2023 12:12 )   PT: 12.8 sec;   INR: 1.07          PTT - ( 24 May 2023 15:05 )  PTT:41.2 sec  Urinalysis Basic - ( 22 May 2023 23:18 )    Color: Yellow / Appearance: Clear / S.020 / pH: x  Gluc: x / Ketone: NEGATIVE  / Bili: Negative / Urobili: 0.2 E.U./dL   Blood: x / Protein: 30 mg/dL / Nitrite: POSITIVE   Leuk Esterase: Small / RBC: 5-10 /HPF / WBC > 10 /HPF   Sq Epi: x / Non Sq Epi: x / Bacteria: Present /HPF        CULTURES:      Culture - Blood (collected 23 @ 01:00)  Source: .Blood Blood  Preliminary Report (23 @ 03:00):    No growth at 1 day.    Culture - Blood (collected 23 @ 01:00)  Source: .Blood Blood  Preliminary Report (23 @ 03:00):    No growth at 1 day.        Culture - Urine (collected 23 @ 23:18)  Source: Suprapubic None  Preliminary Report (23 @ 08:43):    >100,000 CFU/ml Lactose Fermenting  Gram Negative Rods    Identification and susceptibility to follow.        RADIOLOGY & ADDITIONAL STUDIES:

## 2023-05-24 NOTE — PROGRESS NOTE ADULT - ASSESSMENT
44 yo F with PMHx asthma, hx PE (Eliquis), DM, HTN, HLD, hx abdominal necrotizing fasciitis (s/p suprapubic catheter, ostomy placement in 11/2021) px to [facility] ED from home with worsening abd/suprapubic pain and drainage from prior suprapubic catheter insertion site found to have sepsis sepsis 2/2 UTI and catheter-site infection.

## 2023-05-24 NOTE — PROGRESS NOTE ADULT - ASSESSMENT
44 y/o F with PMHx hx PE (Eliquis), DM, HTN, HLD, hx abdominal necrotizing fasciitis (s/p suprapubic catheter, ostomy placement in 11/2021), admitted to medicine for sepsis 2/2 complicated UTI and infection at SPT site. SPT culture growing >100,000 GNRs.     Recommendations:  -Continue broad spectrum abx per primary team until cultures finalize  -Recommend IR replacement of her suprapubic tube in setting of complicated UTI/sepsis - patient reports she does not tolerate bedside exchanges of her suprapubic catheter and is declining at this time  -Removing the SPT would be an option, however patient reports severe dysuria and generalized abdominal pain when voiding spontaneously, so there is concern for urinary retention without the suprapubic catheter. She is also not amenable to removing it completely.  -Rest of care per primary team  -Discussed with attending

## 2023-05-24 NOTE — PROGRESS NOTE ADULT - PROBLEM SELECTOR PLAN 1
Px with worsening lower abd pain, drainage from suprapubic catheter site. Meeting 2/4 SIRS (HR, WBC) with elevated lactate 5.3 (cleared after fluids to 1.7). CTAP largely unremarkable. On exam, current cath site with surrounding skin changes and old site open with visible underlying catheter. Likely source UTI given positive UA, hx of UTI, and prior urine cx growing carbapenem-resistant and ESBL Klebsiella. s/p 2L NS, Gentamicin in ED (did not get Vancomycin).     - IR replacement of suprapubic tube iso complicated UTI/sepsis w potential replacement 5/25  - Gentamicin 400mg (weight based) w 5/25 AM level, f/u ID recs for duration abx   - Suprapubic Cath cultures (5/22) +GNR, lactose fermenting   - Blood cultures (5/23) NGTD   - Pain management: Tylenol 975mg q8hrs, Ibuprofen 200mg q6hrs, PRN oxy (moderate 5mg q6, severe 10mg q4)   - Urology following, f/u recs   - ID following, f/u recs

## 2023-05-24 NOTE — PROGRESS NOTE ADULT - ASSESSMENT
44 yo F with PMHx asthma, hx PE (Eliquis), DM, HTN, HLD, hx abdominal necrotizing fasciitis (s/p suprapubic catheter, ostomy placement in 11/2021) px to [facility] ED from home with worsening abd/suprapubic pain and drainage from prior suprapubic catheter insertion site found to have catheter induced urinary tract infection. ID consulted for antibiotic recommendation given patient's history of abx resistance.    Relevant data: leukocytosis (11.08), UA from 5/22 (+) for nitrites, bacteria, WBC, and RBC, lactate downtrending (on admission 5.3-->1.7 today), CT abd/pelvis showed suprapubic pigtail catheter within collapsed urinary bladder, R ovarian simple cyst, b/l non obstructive kidney stones, urine culture and blood culture pending    INCOMPLETE    Recommendations:  - F/u urine and blood culture  - No need to redose Gentamicin today since dose already given earlier today. obtain speciation to guide antibiotic therapy   - would favor exchanging suprapubic urinary catheter given presentation     INCOMPLETE    42 yo F with PMHx asthma, hx PE (Eliquis), DM, HTN, HLD, hx abdominal necrotizing fasciitis (s/p suprapubic catheter, ostomy placement in 11/2021) px to [facility] ED from home with worsening abd/suprapubic pain and drainage from prior suprapubic catheter insertion site found to have catheter induced urinary tract infection. ID consulted for antibiotic recommendation given patient's history of abx resistance.    Relevant data: leukocytosis (11.87, but stable from yesterday 11.08), UA from 5/22 (+) for nitrites, bacteria, WBC, and RBC, lactate downtrending (on admission 5.3-->1.7 today), CT abd/pelvis showed suprapubic pigtail catheter within collapsed urinary bladder, R ovarian simple cyst, b/l non obstructive kidney stones, urine culture (+) for lactose fermenting gram negative rods-- identification and susceptibility pending; blood culture negative for any growth    Recommendations:  - F/u urine culture identification and susceptibility to guide antibiotic therapy  - Gentamicin 500mg dose stat and check trough levels with AM labs  - would suggest permanent removal of suprapubic urinary catheter given multiple hospitalizations for CAUTI and patient is able to urinate through normal anatomy         42 yo F with PMHx asthma, hx PE (Eliquis), DM, HTN, HLD, hx abdominal necrotizing fasciitis (s/p suprapubic catheter, ostomy placement in 11/2021) px to [facility] ED from home with worsening abd/suprapubic pain and drainage from prior suprapubic catheter insertion site found to have catheter induced urinary tract infection. ID consulted for antibiotic recommendation given patient's history of abx resistance.    Relevant data: leukocytosis (11.87, but stable from yesterday 11.08), UA from 5/22 (+) for nitrites, bacteria, WBC, and RBC, lactate downtrending (on admission 5.3-->1.7 today), CT abd/pelvis showed suprapubic pigtail catheter within collapsed urinary bladder, R ovarian simple cyst, b/l non obstructive kidney stones, urine culture (+) for lactose fermenting gram negative rods-- identification and susceptibility pending; blood culture negative for any growth    Recommendations:  - F/u urine culture identification and susceptibility to guide antibiotic therapy  - Gentamicin 500mg dose stat and check trough levels with AM labs  - would suggest permanent removal of suprapubic urinary catheter given multiple hospitalizations for CAUTI and patient is able to urinate through normal anatomy  - please ensure documentation is made on whether IR is amenable to removal of suprapubic catheter        44 yo F with PMHx asthma, hx PE (Eliquis), DM, HTN, HLD, hx abdominal necrotizing fasciitis (s/p suprapubic catheter, ostomy placement in 11/2021) px to [facility] ED from home with worsening abd/suprapubic pain and drainage from prior suprapubic catheter insertion site found to have catheter induced urinary tract infection. ID consulted for antibiotic recommendation given patient's history of abx resistance.    Relevant data: leukocytosis (11.87, but stable from yesterday 11.08), UA from 5/22 (+) for nitrites, bacteria, WBC, and RBC, lactate downtrending (on admission 5.3-->1.7 today), CT abd/pelvis showed suprapubic pigtail catheter within collapsed urinary bladder, R ovarian simple cyst, b/l non obstructive kidney stones, urine culture (+) for lactose fermenting gram negative rods-- identification and susceptibility pending; blood culture negative for any growth    Recommendations:  - F/u urine culture identification and susceptibility to guide antibiotic therapy  - Gentamicin 400mg dose stat and check trough levels with AM labs  - would suggest permanent removal of suprapubic urinary catheter given multiple hospitalizations for CAUTI and patient is able to urinate through normal anatomy  - please ensure documentation is made on whether IR is amenable to removal of suprapubic catheter        42 yo F with PMHx asthma, hx PE (Eliquis), DM, HTN, HLD, hx abdominal necrotizing fasciitis (s/p suprapubic catheter, ostomy placement in 11/2021) px to [facility] ED from home with worsening abd/suprapubic pain and drainage from prior suprapubic catheter insertion site found to have catheter induced urinary tract infection. ID consulted for antibiotic recommendation given patient's history of abx resistance.    Relevant data: leukocytosis (11.87, but stable from yesterday 11.08), UA from 5/22 (+) for nitrites, bacteria, WBC, and RBC, lactate downtrending (on admission 5.3-->1.7 today), CT abd/pelvis showed suprapubic pigtail catheter within collapsed urinary bladder, R ovarian simple cyst, b/l non obstructive kidney stones, urine culture (+) for lactose fermenting gram negative rods-- identification and susceptibility pending; blood culture negative for any growth    Recommendations:  - F/u urine culture identification and susceptibility to guide antibiotic therapy  - Gentamicin 400mg dose stat and check trough levels with AM labs  - would suggest permanent removal of suprapubic urinary catheter given multiple hospitalizations for CAUTI and patient is able to urinate through normal anatomy--f/u with   - please ensure documentation is made on whether IR is amenable to removal of suprapubic catheter

## 2023-05-24 NOTE — PHYSICAL THERAPY INITIAL EVALUATION ADULT - ADDITIONAL COMMENTS
pt states that she lives w/ family in an elevator access apt building. ambulates w/ use of RW and AFO. denies hx of recent falls. was mostly independent in ADLs prior to this admission

## 2023-05-24 NOTE — PROGRESS NOTE ADULT - SUBJECTIVE AND OBJECTIVE BOX
HASMUKH AGUILERA  43y  Female    Overnight: TALIA  Subjective: Patient examined at bedside. Patient with pain this AM in suprapubic area, extending to b/l upper groin with some mild itchiness around catheter site, particularly with movement. Patient had no n/v/d, chills.       T(C): 36.9 (23 @ 20:55), Max: 36.9 (23 @ 06:30)  HR: 102 (23 @ 20:55) (96 - 107)  BP: 148/84 (23 @ 20:55) (122/74 - 172/92)  RR: 18 (23 @ 20:55) (17 - 20)  SpO2: 97% (23 @ 20:55) (97% - 99%)  Wt(kg): --Vital Signs Last 24 Hrs  T(C): 36.9 (24 May 2023 20:55), Max: 36.9 (24 May 2023 06:30)  T(F): 98.4 (24 May 2023 20:55), Max: 98.5 (24 May 2023 06:30)  HR: 102 (24 May 2023 20:55) (96 - 107)  BP: 148/84 (24 May 2023 20:55) (122/74 - 172/92)  BP(mean): 101 (24 May 2023 19:59) (90 - 125)  RR: 18 (24 May 2023 20:55) (17 - 20)  SpO2: 97% (24 May 2023 20:55) (97% - 99%)    Parameters below as of 24 May 2023 20:55  Patient On (Oxygen Delivery Method): room air        PHYSICAL EXAM:  General: +agitated (however reduced throughout day with pain control).   Eyes: Clear conjunctiva  ENT: MMM  Neck: supple; no JVD   Respiratory: CTA B/L; no W/R/R, no retractions. Satting well on RA.   Cardiac: +S1/S2; Tachycardic. No murmurs, rubs, gallops.   Gastrointestinal: soft, NT/ND; no rebound or guarding; +BSx4. Ostomy site appears clean/dry.   : Current suprapubic catheter site in place with some surrounding purple skin change and no active drainage. Former suprapubic catheter site open without any visible drainage.  Surrounding skin, moist but without erythema or signs of infection.   Extremities: WWP, no clubbing or cyanosis; no peripheral edema  Musculoskeletal: NROM x4; no joint swelling, tenderness or erythema. L foot drop.   Vascular: 2+ radial, DP/PT pulses B/L  Neurologic: AAOx3  Psychiatric: + anxious at times, concerned about pain.    Consultant(s) Notes Reviewed:  [x ] YES  [ ] NO  Care Discussed with Consultants/Other Providers [ x] YES  [ ] NO    LABS:                        12.0   11.87 )-----------( 501      ( 24 May 2023 05:30 )             39.2         137  |  99  |  8   ----------------------------<  243<H>  3.4<L>   |  25  |  0.65    Ca    8.3<L>      24 May 2023 15:05  Phos  2.4       Mg     1.7         TPro  7.5  /  Alb  3.8  /  TBili  <0.2  /  DBili  x   /  AST  13  /  ALT  10  /  AlkPhos  155<H>        PT/INR - ( 23 May 2023 12:12 )   PT: 12.8 sec;   INR: 1.07          PTT - ( 24 May 2023 15:05 )  PTT:41.2 sec  Urinalysis Basic - ( 22 May 2023 23:18 )    Color: Yellow / Appearance: Clear / S.020 / pH: x  Gluc: x / Ketone: NEGATIVE  / Bili: Negative / Urobili: 0.2 E.U./dL   Blood: x / Protein: 30 mg/dL / Nitrite: POSITIVE   Leuk Esterase: Small / RBC: 5-10 /HPF / WBC > 10 /HPF   Sq Epi: x / Non Sq Epi: x / Bacteria: Present /HPF      CAPILLARY BLOOD GLUCOSE      POCT Blood Glucose.: 403 mg/dL (24 May 2023 20:02)  POCT Blood Glucose.: 385 mg/dL (24 May 2023 18:17)  POCT Blood Glucose.: 205 mg/dL (24 May 2023 14:48)  POCT Blood Glucose.: 213 mg/dL (24 May 2023 13:11)  POCT Blood Glucose.: 124 mg/dL (24 May 2023 08:37)  POCT Blood Glucose.: 90 mg/dL (24 May 2023 07:38)  POCT Blood Glucose.: 208 mg/dL (23 May 2023 22:32)        Urinalysis Basic - ( 22 May 2023 23:18 )    Color: Yellow / Appearance: Clear / S.020 / pH: x  Gluc: x / Ketone: NEGATIVE  / Bili: Negative / Urobili: 0.2 E.U./dL   Blood: x / Protein: 30 mg/dL / Nitrite: POSITIVE   Leuk Esterase: Small / RBC: 5-10 /HPF / WBC > 10 /HPF   Sq Epi: x / Non Sq Epi: x / Bacteria: Present /HPF        RADIOLOGY & ADDITIONAL TESTS:    Imaging Personally Reviewed:  [ ] YES  [ ] NO

## 2023-05-24 NOTE — PROGRESS NOTE ADULT - PROBLEM SELECTOR PLAN 3
Initial labs remarkable for K 2.6, repleted with potassium Cl 10mEq IVPB x3 with repeat potassium 3.1. EKG showing sinus tachycardia without prolonged QTC or any other changes. Pt reports decreased PO intake 2/2 pain but denies any other insensible losses, no increased ostomy output.     - Replete K as necessary w daily BMPs

## 2023-05-24 NOTE — PROGRESS NOTE ADULT - PROBLEM SELECTOR PLAN 7
Hx of HTN. Home medications include Labetalol 100 mg BID and Losartan 50mg Q24.     - c/w labetalol 100mg BID

## 2023-05-24 NOTE — PHYSICAL THERAPY INITIAL EVALUATION ADULT - PERTINENT HX OF CURRENT PROBLEM, REHAB EVAL
42 yo F with PMHx asthma, hx PE (Eliquis), DM, HTN, HLD, hx abdominal necrotizing fasciitis (s/p suprapubic catheter, ostomy placement in 11/2021) px to [facility] ED from home with worsening abd/suprapubic pain and drainage from prior suprapubic catheter insertion site found to have catheter induced urinary tract infection. ID consulted for antibiotic recommendation given patient's history of abx resistance.

## 2023-05-24 NOTE — PROGRESS NOTE ADULT - PROBLEM SELECTOR PLAN 6
Hx of DM. Prior hospital course c/b labile blood glucose with endocrine consulted. Last A1C 12 prior admission. Home regimen per prior discharge includes Humulin 100U TID.     - mISS  - On lantus 20U at bedtime w adjustment based on insulin requirements   - Diet, Consistent Carb

## 2023-05-25 LAB
ALBUMIN SERPL ELPH-MCNC: 3.3 G/DL — SIGNIFICANT CHANGE UP (ref 3.3–5)
ALP SERPL-CCNC: 118 U/L — SIGNIFICANT CHANGE UP (ref 40–120)
ALT FLD-CCNC: 9 U/L — LOW (ref 10–45)
ANION GAP SERPL CALC-SCNC: 8 MMOL/L — SIGNIFICANT CHANGE UP (ref 5–17)
APTT BLD: 66.2 SEC — HIGH (ref 27.5–35.5)
AST SERPL-CCNC: 14 U/L — SIGNIFICANT CHANGE UP (ref 10–40)
BASOPHILS # BLD AUTO: 0.02 K/UL — SIGNIFICANT CHANGE UP (ref 0–0.2)
BASOPHILS NFR BLD AUTO: 0.2 % — SIGNIFICANT CHANGE UP (ref 0–2)
BILIRUB SERPL-MCNC: 0.2 MG/DL — SIGNIFICANT CHANGE UP (ref 0.2–1.2)
BUN SERPL-MCNC: 13 MG/DL — SIGNIFICANT CHANGE UP (ref 7–23)
CALCIUM SERPL-MCNC: 8.5 MG/DL — SIGNIFICANT CHANGE UP (ref 8.4–10.5)
CHLORIDE SERPL-SCNC: 105 MMOL/L — SIGNIFICANT CHANGE UP (ref 96–108)
CO2 SERPL-SCNC: 25 MMOL/L — SIGNIFICANT CHANGE UP (ref 22–31)
CREAT SERPL-MCNC: 0.66 MG/DL — SIGNIFICANT CHANGE UP (ref 0.5–1.3)
EGFR: 112 ML/MIN/1.73M2 — SIGNIFICANT CHANGE UP
EOSINOPHIL # BLD AUTO: 0.2 K/UL — SIGNIFICANT CHANGE UP (ref 0–0.5)
EOSINOPHIL NFR BLD AUTO: 2.2 % — SIGNIFICANT CHANGE UP (ref 0–6)
GENTAMICIN TROUGH SERPL-MCNC: 1.3 UG/ML — SIGNIFICANT CHANGE UP (ref 0–2)
GLUCOSE BLDC GLUCOMTR-MCNC: 191 MG/DL — HIGH (ref 70–99)
GLUCOSE BLDC GLUCOMTR-MCNC: 195 MG/DL — HIGH (ref 70–99)
GLUCOSE BLDC GLUCOMTR-MCNC: 209 MG/DL — HIGH (ref 70–99)
GLUCOSE BLDC GLUCOMTR-MCNC: 319 MG/DL — HIGH (ref 70–99)
GLUCOSE SERPL-MCNC: 199 MG/DL — HIGH (ref 70–99)
HCT VFR BLD CALC: 35.5 % — SIGNIFICANT CHANGE UP (ref 34.5–45)
HGB BLD-MCNC: 10.6 G/DL — LOW (ref 11.5–15.5)
IMM GRANULOCYTES NFR BLD AUTO: 0.6 % — SIGNIFICANT CHANGE UP (ref 0–0.9)
INR BLD: 1.02 — SIGNIFICANT CHANGE UP (ref 0.88–1.16)
LYMPHOCYTES # BLD AUTO: 3.34 K/UL — HIGH (ref 1–3.3)
LYMPHOCYTES # BLD AUTO: 37 % — SIGNIFICANT CHANGE UP (ref 13–44)
MAGNESIUM SERPL-MCNC: 1.8 MG/DL — SIGNIFICANT CHANGE UP (ref 1.6–2.6)
MCHC RBC-ENTMCNC: 23.8 PG — LOW (ref 27–34)
MCHC RBC-ENTMCNC: 29.9 GM/DL — LOW (ref 32–36)
MCV RBC AUTO: 79.8 FL — LOW (ref 80–100)
MONOCYTES # BLD AUTO: 0.62 K/UL — SIGNIFICANT CHANGE UP (ref 0–0.9)
MONOCYTES NFR BLD AUTO: 6.9 % — SIGNIFICANT CHANGE UP (ref 2–14)
NEUTROPHILS # BLD AUTO: 4.8 K/UL — SIGNIFICANT CHANGE UP (ref 1.8–7.4)
NEUTROPHILS NFR BLD AUTO: 53.1 % — SIGNIFICANT CHANGE UP (ref 43–77)
NRBC # BLD: 0 /100 WBCS — SIGNIFICANT CHANGE UP (ref 0–0)
PHOSPHATE SERPL-MCNC: 4.1 MG/DL — SIGNIFICANT CHANGE UP (ref 2.5–4.5)
PLATELET # BLD AUTO: 356 K/UL — SIGNIFICANT CHANGE UP (ref 150–400)
POTASSIUM SERPL-MCNC: 4.5 MMOL/L — SIGNIFICANT CHANGE UP (ref 3.5–5.3)
POTASSIUM SERPL-SCNC: 4.5 MMOL/L — SIGNIFICANT CHANGE UP (ref 3.5–5.3)
PROT SERPL-MCNC: 6.8 G/DL — SIGNIFICANT CHANGE UP (ref 6–8.3)
PROTHROM AB SERPL-ACNC: 12.2 SEC — SIGNIFICANT CHANGE UP (ref 10.5–13.4)
RBC # BLD: 4.45 M/UL — SIGNIFICANT CHANGE UP (ref 3.8–5.2)
RBC # FLD: 16.1 % — HIGH (ref 10.3–14.5)
SODIUM SERPL-SCNC: 138 MMOL/L — SIGNIFICANT CHANGE UP (ref 135–145)
WBC # BLD: 9.03 K/UL — SIGNIFICANT CHANGE UP (ref 3.8–10.5)
WBC # FLD AUTO: 9.03 K/UL — SIGNIFICANT CHANGE UP (ref 3.8–10.5)

## 2023-05-25 PROCEDURE — 99232 SBSQ HOSP IP/OBS MODERATE 35: CPT

## 2023-05-25 PROCEDURE — 51705 CHANGE OF BLADDER TUBE: CPT

## 2023-05-25 PROCEDURE — 99232 SBSQ HOSP IP/OBS MODERATE 35: CPT | Mod: GC

## 2023-05-25 PROCEDURE — 75984 XRAY CONTROL CATHETER CHANGE: CPT | Mod: 26

## 2023-05-25 RX ORDER — MAGNESIUM SULFATE 500 MG/ML
1 VIAL (ML) INJECTION ONCE
Refills: 0 | Status: COMPLETED | OUTPATIENT
Start: 2023-05-25 | End: 2023-05-25

## 2023-05-25 RX ORDER — INSULIN GLARGINE 100 [IU]/ML
20 INJECTION, SOLUTION SUBCUTANEOUS EVERY MORNING
Refills: 0 | Status: DISCONTINUED | OUTPATIENT
Start: 2023-05-25 | End: 2023-05-26

## 2023-05-25 RX ORDER — INSULIN LISPRO 100/ML
2 VIAL (ML) SUBCUTANEOUS
Refills: 0 | Status: DISCONTINUED | OUTPATIENT
Start: 2023-05-25 | End: 2023-05-27

## 2023-05-25 RX ORDER — ENOXAPARIN SODIUM 100 MG/ML
40 INJECTION SUBCUTANEOUS EVERY 24 HOURS
Refills: 0 | Status: DISCONTINUED | OUTPATIENT
Start: 2023-05-26 | End: 2023-05-26

## 2023-05-25 RX ORDER — INSULIN GLARGINE 100 [IU]/ML
25 INJECTION, SOLUTION SUBCUTANEOUS AT BEDTIME
Refills: 0 | Status: DISCONTINUED | OUTPATIENT
Start: 2023-05-25 | End: 2023-05-25

## 2023-05-25 RX ORDER — HYDROMORPHONE HYDROCHLORIDE 2 MG/ML
0.5 INJECTION INTRAMUSCULAR; INTRAVENOUS; SUBCUTANEOUS ONCE
Refills: 0 | Status: DISCONTINUED | OUTPATIENT
Start: 2023-05-25 | End: 2023-05-25

## 2023-05-25 RX ORDER — GENTAMICIN SULFATE 40 MG/ML
400 VIAL (ML) INJECTION ONCE
Refills: 0 | Status: COMPLETED | OUTPATIENT
Start: 2023-05-25 | End: 2023-05-25

## 2023-05-25 RX ADMIN — Medication 200 MILLIGRAM(S): at 19:47

## 2023-05-25 RX ADMIN — Medication 200 MILLIGRAM(S): at 07:58

## 2023-05-25 RX ADMIN — Medication 200 MILLIGRAM(S): at 23:43

## 2023-05-25 RX ADMIN — OXYCODONE HYDROCHLORIDE 10 MILLIGRAM(S): 5 TABLET ORAL at 07:58

## 2023-05-25 RX ADMIN — Medication 100 MILLIGRAM(S): at 18:24

## 2023-05-25 RX ADMIN — Medication 2 UNIT(S): at 18:37

## 2023-05-25 RX ADMIN — Medication 8: at 22:44

## 2023-05-25 RX ADMIN — OXYCODONE HYDROCHLORIDE 10 MILLIGRAM(S): 5 TABLET ORAL at 00:36

## 2023-05-25 RX ADMIN — OXYCODONE HYDROCHLORIDE 10 MILLIGRAM(S): 5 TABLET ORAL at 15:05

## 2023-05-25 RX ADMIN — Medication 200 MILLIGRAM(S): at 00:22

## 2023-05-25 RX ADMIN — HEPARIN SODIUM 18 UNIT(S)/HR: 5000 INJECTION INTRAVENOUS; SUBCUTANEOUS at 04:50

## 2023-05-25 RX ADMIN — Medication 200 MILLIGRAM(S): at 18:24

## 2023-05-25 RX ADMIN — OXYCODONE HYDROCHLORIDE 10 MILLIGRAM(S): 5 TABLET ORAL at 22:44

## 2023-05-25 RX ADMIN — Medication 975 MILLIGRAM(S): at 13:34

## 2023-05-25 RX ADMIN — Medication 200 MILLIGRAM(S): at 12:07

## 2023-05-25 RX ADMIN — Medication 200 MILLIGRAM(S): at 13:05

## 2023-05-25 RX ADMIN — Medication 200 MILLIGRAM(S): at 06:51

## 2023-05-25 RX ADMIN — OXYCODONE HYDROCHLORIDE 10 MILLIGRAM(S): 5 TABLET ORAL at 06:53

## 2023-05-25 RX ADMIN — Medication 100 MILLIGRAM(S): at 06:50

## 2023-05-25 RX ADMIN — OXYCODONE HYDROCHLORIDE 5 MILLIGRAM(S): 5 TABLET ORAL at 10:13

## 2023-05-25 RX ADMIN — Medication 200 MILLIGRAM(S): at 19:21

## 2023-05-25 RX ADMIN — ALBUTEROL 1 PUFF(S): 90 AEROSOL, METERED ORAL at 09:51

## 2023-05-25 RX ADMIN — OXYCODONE HYDROCHLORIDE 10 MILLIGRAM(S): 5 TABLET ORAL at 14:05

## 2023-05-25 RX ADMIN — OXYCODONE HYDROCHLORIDE 10 MILLIGRAM(S): 5 TABLET ORAL at 23:44

## 2023-05-25 RX ADMIN — Medication 975 MILLIGRAM(S): at 14:30

## 2023-05-25 RX ADMIN — OXYCODONE HYDROCHLORIDE 5 MILLIGRAM(S): 5 TABLET ORAL at 11:10

## 2023-05-25 RX ADMIN — HYDROMORPHONE HYDROCHLORIDE 0.5 MILLIGRAM(S): 2 INJECTION INTRAMUSCULAR; INTRAVENOUS; SUBCUTANEOUS at 19:21

## 2023-05-25 RX ADMIN — OXYCODONE HYDROCHLORIDE 5 MILLIGRAM(S): 5 TABLET ORAL at 23:47

## 2023-05-25 RX ADMIN — Medication 100 GRAM(S): at 09:41

## 2023-05-25 RX ADMIN — Medication 2: at 18:38

## 2023-05-25 RX ADMIN — OXYCODONE HYDROCHLORIDE 10 MILLIGRAM(S): 5 TABLET ORAL at 01:53

## 2023-05-25 RX ADMIN — HYDROMORPHONE HYDROCHLORIDE 0.5 MILLIGRAM(S): 2 INJECTION INTRAMUSCULAR; INTRAVENOUS; SUBCUTANEOUS at 18:25

## 2023-05-25 NOTE — PROGRESS NOTE ADULT - PROBLEM SELECTOR PLAN 1
Px with worsening lower abd pain, drainage from suprapubic catheter site. Meeting 2/4 SIRS (HR, WBC) with elevated lactate 5.3 (cleared after fluids to 1.7). CTAP largely unremarkable. On exam, current cath site with surrounding skin changes and old site open with visible underlying catheter. Likely source UTI given positive UA, hx of UTI, and prior urine cx growing carbapenem-resistant and ESBL Klebsiella. s/p 2L NS, Gentamicin in ED (did not get Vancomycin).     - IR replacement of suprapubic tube iso complicated UTI/sepsis  - Gentamicin 400mg (weight based) w 5/25 AM level, f/u ID recs for duration abx   - Suprapubic Cath cultures (5/22) +Klebsiella   - Blood cultures (5/23) NGTD   - Pain management: Tylenol 975mg q8hrs, Ibuprofen 200mg q6hrs, PRN oxy (moderate 5mg q6, severe 10mg q4)   - Urology following, f/u recs   - ID following, f/u recs Px with worsening lower abd pain, drainage from suprapubic catheter site. Meeting 2/4 SIRS (HR, WBC) with elevated lactate 5.3 (cleared after fluids to 1.7). CTAP largely unremarkable. On exam, current cath site with surrounding skin changes and old site open with visible underlying catheter. Likely source UTI given positive UA, hx of UTI, and prior urine cx growing carbapenem-resistant and ESBL Klebsiella. s/p 2L NS, Gentamicin in ED (did not get Vancomycin).     - IR replacement of suprapubic tube iso complicated UTI/sepsis  - c/w Gentamicin 400mg (weight based) w AM level (goal <2)   - Suprapubic Cath cultures (5/22): +Klebsiella, + Ecoli, pending susceptibilities   - Blood cultures (5/23) NGTD   - Pain management: Tylenol 975mg q8hrs, Ibuprofen 200mg q6hrs, PRN oxy (moderate 5mg q6, severe 10mg q4)   - Urology following, f/u recs   - ID following, f/u recs

## 2023-05-25 NOTE — CONSULT NOTE ADULT - ASSESSMENT
Assessment: 44 y/o F with PMHx hx PE (Eliquis), DM, HTN, HLD, hx abdominal necrotizing fasciitis (s/p suprapubic catheter, ostomy placement in 11/2021), admitted to medicine for sepsis 2/2 complicated UTI and infection at SPT site. SPT culture growing >100,000 Klebsiella pneumoniae. Per urology, tract is too small and needs to be upsized. IR consulted for upsizing of catheter. Case reviewed with Dr. Segura, plan for procedure with sedation. Of note, pt reports hives from IV contrast allergy also occurs with intravesical administration and requires premedication.     Recommendations: Maintain NPO x 8 hours prior to procedure. Heparin gtt off for four hours prior.     Communicated with: Dr. Arora

## 2023-05-25 NOTE — PROGRESS NOTE ADULT - ASSESSMENT
42 yo F with PMHx asthma, hx PE (Eliquis), DM, HTN, HLD, hx abdominal necrotizing fasciitis (s/p suprapubic catheter, ostomy placement in 11/2021) px to [facility] ED from home with worsening abd/suprapubic pain and drainage from prior suprapubic catheter insertion site found to have catheter induced urinary tract infection. ID consulted for antibiotic recommendation given patient's history of abx resistance.    Relevant data: leukocytosis (11.87, but stable from yesterday 11.08), UA from 5/22 (+) for nitrites, bacteria, WBC, and RBC, lactate downtrending (on admission 5.3-->1.7 today), CT abd/pelvis showed suprapubic pigtail catheter within collapsed urinary bladder, R ovarian simple cyst, b/l non obstructive kidney stones, urine culture (+) for lactose fermenting gram negative rods-- identification and susceptibility pending; blood culture negative for any growth      INCOMPLETE    Recommendations:  - F/u urine culture identification and susceptibility to guide antibiotic therapy  - Gentamicin 400mg dose stat and check trough levels with AM labs  - would suggest permanent removal of suprapubic urinary catheter given multiple hospitalizations for CAUTI and patient is able to urinate through normal anatomy--f/u with   - please ensure documentation is made on whether IR is amenable to removal of suprapubic catheter        44 yo F with PMHx asthma, hx PE (Eliquis), DM, HTN, HLD, hx abdominal necrotizing fasciitis (s/p suprapubic catheter, ostomy placement in 11/2021) px to [facility] ED from home with worsening abd/suprapubic pain and drainage from prior suprapubic catheter insertion site found to have catheter induced urinary tract infection. ID consulted for antibiotic recommendation given patient's history of abx resistance.    Relevant data: leukocytosis (11.87, but stable from yesterday 11.08), UA from 5/22 (+) for nitrites, bacteria, WBC, and RBC, lactate downtrending (on admission 5.3-->1.7 today), CT abd/pelvis showed suprapubic pigtail catheter within collapsed urinary bladder, R ovarian simple cyst, b/l non obstructive kidney stones, urine culture (+) for lactose fermenting gram negative rods-- identification and susceptibility pending; blood culture negative for any growth  patient scheduled to undergo exchange w/ IR for suprapubic cath. Klebsiella growing on culture, likely MDR organism i/s/o hx.       Recommendations:  - F/u Klebsiella susceptibility to guide antibiotic therapy  - Gentamicin 400mg dose stat and check trough levels with AM labs  - would suggest permanent removal of suprapubic urinary catheter given multiple hospitalizations for CAUTI and patient is able to urinate through normal anatomy--f/u with          44 yo F with PMHx asthma, hx PE (Eliquis), DM, HTN, HLD, hx abdominal necrotizing fasciitis (s/p suprapubic catheter, ostomy placement in 11/2021) px to [facility] ED from home with worsening abd/suprapubic pain and drainage from prior suprapubic catheter insertion site found to have catheter induced urinary tract infection. ID consulted for antibiotic recommendation given patient's history of abx resistance.    Relevant data: leukocytosis (11.87, but stable from yesterday 11.08), UA from 5/22 (+) for nitrites, bacteria, WBC, and RBC, lactate downtrending (on admission 5.3-->1.7 today), CT abd/pelvis showed suprapubic pigtail catheter within collapsed urinary bladder, R ovarian simple cyst, b/l non obstructive kidney stones, urine culture (+) for lactose fermenting gram negative rods-- identification and susceptibility pending; blood culture negative for any growth  patient scheduled to undergo exchange w/ IR for suprapubic cath. Klebsiella growing on culture, likely MDR organism i/s/o hx.       Recommendations:  - F/u Klebsiella susceptibility to guide antibiotic therapy  - Gentamicin 400mg dose at 6pm and check trough levels with AM labs  - would suggest permanent removal of suprapubic urinary catheter given multiple hospitalizations for CAUTI and patient is able to urinate through normal anatomy--f/u with

## 2023-05-25 NOTE — PROGRESS NOTE ADULT - ATTENDING COMMENTS
date of service 5/25/23  Pt for replacement of suprapubic catheter today w IR. Noted Meropenem resistant bacteria on UCx - appreciate ID recs for abx. Overall remains clinically stable.     DISPO: Home w HPT

## 2023-05-25 NOTE — PROGRESS NOTE ADULT - PROBLEM SELECTOR PLAN 9
Hx of asthma. Well-controlled.    - Continue Ventolin Q24 PRN RESOLVED   Initial labs remarkable for K 2.6, repleted with potassium Cl 10mEq IVPB x3 with repeat potassium 3.1. EKG showing sinus tachycardia without prolonged QTC or any other changes. Pt reports decreased PO intake 2/2 pain but denies any other insensible losses, no increased ostomy output.     - Replete K as necessary w daily BMPs

## 2023-05-25 NOTE — PROGRESS NOTE ADULT - ASSESSMENT
42 yo F with PMHx asthma, hx PE (Eliquis), DM, HTN, HLD, hx abdominal necrotizing fasciitis (s/p suprapubic catheter, ostomy placement in 11/2021) px to [facility] ED from home with worsening abd/suprapubic pain and drainage from prior suprapubic catheter insertion site found to have sepsis sepsis 2/2 UTI and catheter-site infection.

## 2023-05-25 NOTE — PROGRESS NOTE ADULT - PROBLEM SELECTOR PLAN 10
F: s/p 2L NS in ED  E: Replete PRN  N: Diet, DASH/TLC/CC  DVT ppx: Holding Eliquis i/s/o procedure, on hep drip  GI ppx: None  Bowel: None  Dispo: RMF    PCP: Dr. Pelon Sánchez ([place] @ [facility])     FULL CODE F: s/p 2L NS in ED  E: Replete PRN  DVT ppx: Holding Eliquis i/s/o procedure, on hep drip  GI ppx: None  Bowel: None  Dispo: RMF    PCP: Dr. Pelon Sánchez ([place] @ [facility])     FULL CODE

## 2023-05-25 NOTE — PROGRESS NOTE ADULT - ATTENDING COMMENTS
Agree with above.  Continue Gentamicin 400 mg IV daily (give dose at 6 pm).  Can check trough 6-14 hrs after dose (ok to do with AM labs provided they are done by 8 am. Goal is < 2. Follow up urine culture sensitivities

## 2023-05-25 NOTE — PROGRESS NOTE ADULT - PROBLEM SELECTOR PLAN 7
Hx of HTN. Home medications include Labetalol 100 mg BID and Losartan 50mg Q24.     - c/w labetalol 100mg BID CTPE on 4/4 showing bilateral PEs. Home regimen includes Eliquis 5mg Q12.    - Holding Eliquis pending possible suprapubic catheter exchange procedure  - Hep on hold for procedure

## 2023-05-25 NOTE — PROGRESS NOTE ADULT - PROBLEM SELECTOR PLAN 6
Hx of DM. Prior hospital course c/b labile blood glucose with endocrine consulted. Last A1C 12 prior admission. Home regimen per prior discharge includes Humulin 100U TID.     - mISS  - On lantus 20U at bedtime w adjustment based on insulin requirements   - Diet, Consistent Carb Hx of DM. Prior hospital course c/b labile blood glucose with endocrine consulted. Last A1C 12 prior admission. Home regimen per prior discharge includes Humulin 100U TID.     - mISS  - Increased lantus 25U bedtime w 2U lispro based on insulin requirements, ctm  - Diet, Consistent Carb Hx of HTN. Home medications include Labetalol 100 mg BID and Losartan 50mg Q24.     - c/w labetalol 100mg BID

## 2023-05-25 NOTE — PROGRESS NOTE ADULT - SUBJECTIVE AND OBJECTIVE BOX
INFECTIOUS DISEASES CONSULT FOLLOW-UP NOTE    INTERVAL HPI/OVERNIGHT EVENTS:      ROS:   Constitutional, eyes, ENT, cardiovascular, respiratory, gastrointestinal, genitourinary, integumentary, neurological, psychiatric and heme/lymph are otherwise negative other than noted above       ANTIBIOTICS/RELEVANT:    MEDICATIONS  (STANDING):  acetaminophen     Tablet .. 975 milliGRAM(s) Oral every 8 hours  albuterol    90 MICROgram(s) HFA Inhaler 1 Puff(s) Inhalation daily  dextrose 5%. 1000 milliLiter(s) (50 mL/Hr) IV Continuous <Continuous>  dextrose 5%. 1000 milliLiter(s) (100 mL/Hr) IV Continuous <Continuous>  dextrose 50% Injectable 25 Gram(s) IV Push once  dextrose 50% Injectable 12.5 Gram(s) IV Push once  dextrose 50% Injectable 25 Gram(s) IV Push once  glucagon  Injectable 1 milliGRAM(s) IntraMuscular once  heparin  Infusion 1800 Unit(s)/Hr (18 mL/Hr) IV Continuous <Continuous>  ibuprofen  Tablet. 200 milliGRAM(s) Oral every 6 hours  insulin glargine Injectable (LANTUS) 20 Unit(s) SubCutaneous at bedtime  insulin lispro (ADMELOG) corrective regimen sliding scale   SubCutaneous Before meals and at bedtime  labetalol 100 milliGRAM(s) Oral two times a day    MEDICATIONS  (PRN):  dextrose Oral Gel 15 Gram(s) Oral once PRN Blood Glucose LESS THAN 70 milliGRAM(s)/deciliter  oxyCODONE    IR 5 milliGRAM(s) Oral every 6 hours PRN Moderate Pain (4 - 6)  oxyCODONE    IR 10 milliGRAM(s) Oral every 4 hours PRN Severe Pain (7 - 10)        Vital Signs Last 24 Hrs  T(C): 36.9 (25 May 2023 06:11), Max: 36.9 (24 May 2023 20:55)  T(F): 98.5 (25 May 2023 06:11), Max: 98.5 (25 May 2023 06:11)  HR: 97 (25 May 2023 06:11) (96 - 107)  BP: 111/76 (25 May 2023 06:11) (111/76 - 172/92)  BP(mean): 101 (24 May 2023 19:59) (90 - 125)  RR: 18 (25 May 2023 06:11) (17 - 20)  SpO2: 99% (25 May 2023 06:11) (97% - 99%)    Parameters below as of 24 May 2023 20:55  Patient On (Oxygen Delivery Method): room air        05-24-23 @ 07:01  -  05-25-23 @ 07:00  --------------------------------------------------------  IN: 227 mL / OUT: 700 mL / NET: -473 mL      PHYSICAL EXAM:  Constitutional: alert, NAD  Eyes: the sclera and conjunctiva were normal.   ENT: the ears and nose were normal in appearance.   Neck: the appearance of the neck was normal and the neck was supple.   Pulmonary: no respiratory distress and lungs were clear to auscultation bilaterally.   Heart: heart rate was normal and rhythm regular, normal S1 and S2  Vascular:. there was no peripheral edema  Abdomen: normal bowel sounds, soft, non-tender  Neurological: no focal deficits.   Psychiatric: the affect was normal        LABS:                        12.0   11.87 )-----------( 501      ( 24 May 2023 05:30 )             39.2     05-24    137  |  99  |  8   ----------------------------<  243<H>  3.4<L>   |  25  |  0.65    Ca    8.3<L>      24 May 2023 15:05  Phos  2.4     05-24  Mg     1.7     05-24      PT/INR - ( 23 May 2023 12:12 )   PT: 12.8 sec;   INR: 1.07          PTT - ( 24 May 2023 23:04 )  PTT:65.3 sec      MICROBIOLOGY:      RADIOLOGY & ADDITIONAL STUDIES:  Reviewed INFECTIOUS DISEASES CONSULT FOLLOW-UP NOTE    INTERVAL HPI/OVERNIGHT EVENTS: naeo      ROS:   Constitutional, eyes, ENT, cardiovascular, respiratory, gastrointestinal, genitourinary, integumentary, neurological, psychiatric and heme/lymph are otherwise negative other than noted above       ANTIBIOTICS/RELEVANT:    MEDICATIONS  (STANDING):  acetaminophen     Tablet .. 975 milliGRAM(s) Oral every 8 hours  albuterol    90 MICROgram(s) HFA Inhaler 1 Puff(s) Inhalation daily  dextrose 5%. 1000 milliLiter(s) (50 mL/Hr) IV Continuous <Continuous>  dextrose 5%. 1000 milliLiter(s) (100 mL/Hr) IV Continuous <Continuous>  dextrose 50% Injectable 25 Gram(s) IV Push once  dextrose 50% Injectable 12.5 Gram(s) IV Push once  dextrose 50% Injectable 25 Gram(s) IV Push once  glucagon  Injectable 1 milliGRAM(s) IntraMuscular once  heparin  Infusion 1800 Unit(s)/Hr (18 mL/Hr) IV Continuous <Continuous>  ibuprofen  Tablet. 200 milliGRAM(s) Oral every 6 hours  insulin glargine Injectable (LANTUS) 20 Unit(s) SubCutaneous at bedtime  insulin lispro (ADMELOG) corrective regimen sliding scale   SubCutaneous Before meals and at bedtime  labetalol 100 milliGRAM(s) Oral two times a day    MEDICATIONS  (PRN):  dextrose Oral Gel 15 Gram(s) Oral once PRN Blood Glucose LESS THAN 70 milliGRAM(s)/deciliter  oxyCODONE    IR 5 milliGRAM(s) Oral every 6 hours PRN Moderate Pain (4 - 6)  oxyCODONE    IR 10 milliGRAM(s) Oral every 4 hours PRN Severe Pain (7 - 10)        Vital Signs Last 24 Hrs  T(C): 36.9 (25 May 2023 06:11), Max: 36.9 (24 May 2023 20:55)  T(F): 98.5 (25 May 2023 06:11), Max: 98.5 (25 May 2023 06:11)  HR: 97 (25 May 2023 06:11) (96 - 107)  BP: 111/76 (25 May 2023 06:11) (111/76 - 172/92)  BP(mean): 101 (24 May 2023 19:59) (90 - 125)  RR: 18 (25 May 2023 06:11) (17 - 20)  SpO2: 99% (25 May 2023 06:11) (97% - 99%)    Parameters below as of 24 May 2023 20:55  Patient On (Oxygen Delivery Method): room air        05-24-23 @ 07:01  -  05-25-23 @ 07:00  --------------------------------------------------------  IN: 227 mL / OUT: 700 mL / NET: -473 mL      PHYSICAL EXAM:  Constitutional: alert, NAD  Eyes: the sclera and conjunctiva were normal.   ENT: the ears and nose were normal in appearance.   Neck: the appearance of the neck was normal and the neck was supple.   Pulmonary: no respiratory distress and lungs were clear to auscultation bilaterally.   Heart: heart rate was normal and rhythm regular, normal S1 and S2  Vascular:. there was no peripheral edema  Abdomen: normal bowel sounds, soft, non-tender  Neurological: no focal deficits.   Psychiatric: the affect was normal        LABS:                        12.0   11.87 )-----------( 501      ( 24 May 2023 05:30 )             39.2     05-24    137  |  99  |  8   ----------------------------<  243<H>  3.4<L>   |  25  |  0.65    Ca    8.3<L>      24 May 2023 15:05  Phos  2.4     05-24  Mg     1.7     05-24      PT/INR - ( 23 May 2023 12:12 )   PT: 12.8 sec;   INR: 1.07          PTT - ( 24 May 2023 23:04 )  PTT:65.3 sec      MICROBIOLOGY:      RADIOLOGY & ADDITIONAL STUDIES:  Reviewed

## 2023-05-25 NOTE — PROGRESS NOTE ADULT - SUBJECTIVE AND OBJECTIVE BOX
UROLOGY PROGRESS NOTE    SUBJECTIVE: Patient seen and examined bedside. Patient reports continued right sided abdominal pain about the same as yesterday, particularly at SPT site, as well as severe pain and difficulty with urination. Denies fevers/chills.    labetalol 100 milliGRAM(s) Oral two times a day      Vital Signs Last 24 Hrs  T(C): 36.9 (25 May 2023 06:11), Max: 36.9 (24 May 2023 20:55)  T(F): 98.5 (25 May 2023 06:11), Max: 98.5 (25 May 2023 06:11)  HR: 97 (25 May 2023 06:11) (96 - 107)  BP: 111/76 (25 May 2023 06:11) (111/76 - 158/109)  BP(mean): 101 (24 May 2023 19:59) (90 - 125)  RR: 18 (25 May 2023 06:11) (17 - 20)  SpO2: 99% (25 May 2023 06:11) (97% - 99%)    Parameters below as of 24 May 2023 20:55  Patient On (Oxygen Delivery Method): room air      I&O's Detail    24 May 2023 07:01  -  25 May 2023 07:00  --------------------------------------------------------  IN:    Heparin: 48 mL    Heparin: 54 mL    IV PiggyBack: 125 mL  Total IN: 227 mL    OUT:    Indwelling Catheter - Suprapubic (mL): 700 mL  Total OUT: 700 mL    Total NET: -473 mL          PHYSICAL EXAM    General: NAD, resting comfortably in bed  C/V: NSR  Pulm: Nonlabored breathing, no respiratory distress on room air  Abd: Obese abdomen, soft, moderately tender in right hemiabdomen, no rebound tenderness.   : SPT site with surrounding erythema without drainage, SPT draining clear yellow urine.  Extrem: WWP, no edema, SCDs in place        LABS:                        10.6   9.03  )-----------( 356      ( 25 May 2023 08:03 )             35.5     05-25    138  |  105  |  13  ----------------------------<  199<H>  4.5   |  25  |  0.66    Ca    8.5      25 May 2023 08:03  Phos  4.1     05-25  Mg     1.8     05-25    TPro  6.8  /  Alb  3.3  /  TBili  0.2  /  DBili  x   /  AST  14  /  ALT  9<L>  /  AlkPhos  118  05-25    PT/INR - ( 25 May 2023 08:03 )   PT: 12.2 sec;   INR: 1.02          PTT - ( 25 May 2023 08:03 )  PTT:66.2 sec      CULTURES:      Culture - Blood (collected 05-23-23 @ 01:00)  Source: .Blood Blood  Preliminary Report (05-25-23 @ 03:00):    No growth at 2 days.    Culture - Blood (collected 05-23-23 @ 01:00)  Source: .Blood Blood  Preliminary Report (05-25-23 @ 03:00):    No growth at 2 days.        Culture - Urine (collected 05-22-23 @ 23:18)  Source: Suprapubic None  Preliminary Report (05-25-23 @ 09:12):    >100,000 CFU/ml Klebsiella pneumoniae    Culture in progress        RADIOLOGY & ADDITIONAL STUDIES:

## 2023-05-25 NOTE — PROGRESS NOTE ADULT - ASSESSMENT
42 y/o F with PMHx hx PE (Eliquis), DM, HTN, HLD, hx abdominal necrotizing fasciitis (s/p suprapubic catheter, ostomy placement in 11/2021), admitted to medicine for sepsis 2/2 complicated UTI and infection at SPT site. SPT culture growing >100,000 Klebsiella pneumoniae.    Recommendations:  -Continue broad spectrum abx per primary team until cultures finalize  -Recommend IR replacement of her suprapubic tube in setting of complicated UTI/sepsis - patient reports she does not tolerate bedside exchanges of her suprapubic catheter and is declining at this time  -Removing the SPT would be an option, however patient reports severe dysuria and generalized abdominal pain when voiding spontaneously, so there is concern for urinary retention without the suprapubic catheter. She is also not amenable to removing it completely.  -Rest of care per primary team  -Discussed with attending

## 2023-05-25 NOTE — PROGRESS NOTE ADULT - PROBLEM SELECTOR PLAN 8
CTPE on 4/4 showing bilateral PEs. Home regimen includes Eliquis 5mg Q12.    - Holding Eliquis pending possible suprapubic catheter exchange procedure  - On hep drip, monitor PTTs CTPE on 4/4 showing bilateral PEs. Home regimen includes Eliquis 5mg Q12.    - Holding Eliquis pending possible suprapubic catheter exchange procedure  - Hep on hold for procedure Hx of asthma. Well-controlled.    - Continue Ventolin Q24 PRN

## 2023-05-25 NOTE — PROGRESS NOTE ADULT - PROBLEM SELECTOR PLAN 3
Initial labs remarkable for K 2.6, repleted with potassium Cl 10mEq IVPB x3 with repeat potassium 3.1. EKG showing sinus tachycardia without prolonged QTC or any other changes. Pt reports decreased PO intake 2/2 pain but denies any other insensible losses, no increased ostomy output.     - Replete K as necessary w daily BMPs RESOLVED   Initial labs remarkable for K 2.6, repleted with potassium Cl 10mEq IVPB x3 with repeat potassium 3.1. EKG showing sinus tachycardia without prolonged QTC or any other changes. Pt reports decreased PO intake 2/2 pain but denies any other insensible losses, no increased ostomy output.     - Replete K as necessary w daily BMPs Hx of DM. Prior hospital course c/b labile blood glucose with endocrine consulted. Last A1C 12 prior admission. Home regimen per prior discharge includes Humulin 100U TID.     - mISS  - On lantus 25 at bedtime w lantus 20 AM, lispro 2u TID.   - Diet, Consistent Carb

## 2023-05-25 NOTE — CONSULT NOTE ADULT - SUBJECTIVE AND OBJECTIVE BOX
44 y/o F with PMHx hx PE (Eliquis), DM, HTN, HLD, hx abdominal necrotizing fasciitis (s/p suprapubic catheter, ostomy placement in 2021), admitted to medicine for sepsis 2/2 complicated UTI and infection at SPT site. SPT culture growing >100,000 Klebsiella pneumoniae. Per urology, tract is too small and needs to be upsized. IR consulted for upsizing of catheter.     Clinical History: SEPSIS    No pertinent family history in first degree relatives    FH: diabetes mellitus    FH: hypertension    Handoff    MEWS Score    Essential hypertension    Hyperlipidemia    Diabetes    Obesity    Hernia    Abdominal hernia    Pre-eclampsia    Pulmonary embolism    Sepsis    Severe sepsis    DM (diabetes mellitus)    HTN (hypertension)    Pulmonary embolism    Nutrition, metabolism, and development symptoms    Chronic suprapubic catheter    UTI (urinary tract infection)    Hypokalemia    Right ovarian cyst    History of creation of ostomy    Pulmonary thromboembolism    Asthma    Ultrasound guided venous access    Ultrasound guided venous access    H/O LEEP    History of D&C    H/O:     H/O ventral hernia repair    H/O exploratory laparotomy    ABDO PAIN    19    Room Service Assist    [email or web address]        Meds:acetaminophen     Tablet .. 975 milliGRAM(s) Oral every 8 hours  albuterol    90 MICROgram(s) HFA Inhaler 1 Puff(s) Inhalation daily  dextrose 5%. 1000 milliLiter(s) IV Continuous <Continuous>  dextrose 5%. 1000 milliLiter(s) IV Continuous <Continuous>  dextrose 50% Injectable 25 Gram(s) IV Push once  dextrose 50% Injectable 12.5 Gram(s) IV Push once  dextrose 50% Injectable 25 Gram(s) IV Push once  dextrose Oral Gel 15 Gram(s) Oral once PRN  gentamicin   IVPB 400 milliGRAM(s) IV Intermittent once  glucagon  Injectable 1 milliGRAM(s) IntraMuscular once  ibuprofen  Tablet. 200 milliGRAM(s) Oral every 6 hours  insulin glargine Injectable (LANTUS) 20 Unit(s) SubCutaneous every morning  insulin lispro (ADMELOG) corrective regimen sliding scale   SubCutaneous Before meals and at bedtime  insulin lispro Injectable (ADMELOG) 2 Unit(s) SubCutaneous three times a day before meals  labetalol 100 milliGRAM(s) Oral two times a day  oxyCODONE    IR 5 milliGRAM(s) Oral every 6 hours PRN  oxyCODONE    IR 10 milliGRAM(s) Oral every 4 hours PRN      Allergies:shellfish. (Hives; Anaphylaxis)  vancomycin (Anaphylaxis; Hives)  penicillin (Hives)  clindamycin (Pruritus)  amoxicillin (Unknown)  Bactrim I.V. (Rash (Mod to Severe))  iodine containing compounds (Hives; Anaphylaxis)  fish (Hives; Urticaria)        Labs:                           10.6   9.03  )-----------( 356      ( 25 May 2023 08:03 )             35.5     PT/INR - ( 25 May 2023 08:03 )   PT: 12.2 sec;   INR: 1.02          PTT - ( 25 May 2023 08:03 )  PTT:66.2 sec      138  |  105  |  13  ----------------------------<  199<H>  4.5   |  25  |  0.66    Ca    8.5      25 May 2023 08:03  Phos  4.1       Mg     1.8         TPro  6.8  /  Alb  3.3  /  TBili  0.2  /  DBili  x   /  AST  14  /  ALT  9<L>  /  AlkPhos  118            Imaging Findings: reviewed

## 2023-05-25 NOTE — PROGRESS NOTE ADULT - PROBLEM SELECTOR PLAN 2
Admitted for severe sepsis 2/2 UTI. UA positive showing small LE, Positive nitrites, >10 WBC. Prior urine cx growing carbapenem-resistant and ESBL Klebsiella. s/p 2L NS, Gentamicin in ED (did not get Vancomycin). Tx with Gentamicin x5d on previous admission. Hx of suprapubic catheter placed in 11/2021 at [facility] as complication of abdominal necrotizing fasciitis, last exchanged at [facility] on 3/24. Current catheter site with some surrounding skin changes and old catheter site now open with visible catheter underneath.     - Plan as above

## 2023-05-25 NOTE — PROGRESS NOTE ADULT - SUBJECTIVE AND OBJECTIVE BOX
HASMUKH AGUILERA  43y  Female    Overnight: TALIA     Subjective: Patient resting comfortably in bed. She has some complaints of pain which improved with current pain regimen. The patient had no chills overnight, however some additional  posterior buttocks pain today.      T(C): 37.2 (05-25-23 @ 13:52), Max: 37.2 (05-25-23 @ 13:52)  HR: 99 (05-25-23 @ 13:52) (96 - 102)  BP: 128/82 (05-25-23 @ 13:52) (111/76 - 148/84)  RR: 18 (05-25-23 @ 13:52) (17 - 20)  SpO2: 98% (05-25-23 @ 13:52) (97% - 99%)  Wt(kg): --Vital Signs Last 24 Hrs  T(C): 37.2 (25 May 2023 13:52), Max: 37.2 (25 May 2023 13:52)  T(F): 98.9 (25 May 2023 13:52), Max: 98.9 (25 May 2023 13:52)  HR: 99 (25 May 2023 13:52) (96 - 102)  BP: 128/82 (25 May 2023 13:52) (111/76 - 148/84)  BP(mean): 101 (24 May 2023 19:59) (90 - 101)  RR: 18 (25 May 2023 13:52) (17 - 20)  SpO2: 98% (25 May 2023 13:52) (97% - 99%)    Parameters below as of 25 May 2023 13:52  Patient On (Oxygen Delivery Method): room air        PHYSICAL EXAM:  General: B/L; no W/R/R, no retractions. Satting well on RA.   Cardiac: +S1/S2; Tachycardic. No murmurs, rubs, gallops.   Gastrointestinal: soft, NT/ND; no rebound or guarding; +BSx4. Ostomy site appears clean/dry.   : Current suprapubic catheter site in place with some surrounding purple skin change and no active drainage. Former suprapubic catheter site open without any visible drainage.  Surrounding skin, moist but without erythema or signs of infection.   Extremities: WWP, no clubbing or cyanosis; no peripheral edema  Musculoskeletal: NROM x4; no joint swelling, tenderness or erythema. L foot drop.   Vascular: 2+ radial, DP/PT pulses B/L  Neurologic: AAOx3  Psychiatric: + anxious at times, concerned about pain.      Consultant(s) Notes Reviewed:  [x ] YES  [ ] NO  Care Discussed with Consultants/Other Providers [ x] YES  [ ] NO    LABS:                        10.6   9.03  )-----------( 356      ( 25 May 2023 08:03 )             35.5     05-25    138  |  105  |  13  ----------------------------<  199<H>  4.5   |  25  |  0.66    Ca    8.5      25 May 2023 08:03  Phos  4.1     05-25  Mg     1.8     05-25    TPro  6.8  /  Alb  3.3  /  TBili  0.2  /  DBili  x   /  AST  14  /  ALT  9<L>  /  AlkPhos  118  05-25      PT/INR - ( 25 May 2023 08:03 )   PT: 12.2 sec;   INR: 1.02          PTT - ( 25 May 2023 08:03 )  PTT:66.2 sec    CAPILLARY BLOOD GLUCOSE      POCT Blood Glucose.: 209 mg/dL (25 May 2023 13:30)  POCT Blood Glucose.: 191 mg/dL (25 May 2023 09:46)  POCT Blood Glucose.: 299 mg/dL (24 May 2023 22:32)  POCT Blood Glucose.: 403 mg/dL (24 May 2023 20:02)  POCT Blood Glucose.: 385 mg/dL (24 May 2023 18:17)            RADIOLOGY & ADDITIONAL TESTS:    Imaging Personally Reviewed:  [ ] YES  [ ] NO   HASMUKH AGUILERA  43y  Female    Overnight: TALIA     Subjective: Patient resting comfortably in bed. She has some complaints of pain which improved with current pain regimen. The patient had no chills overnight, however some additional  posterior buttocks pain today.      T(C): 37.2 (05-25-23 @ 13:52), Max: 37.2 (05-25-23 @ 13:52)  HR: 99 (05-25-23 @ 13:52) (96 - 102)  BP: 128/82 (05-25-23 @ 13:52) (111/76 - 148/84)  RR: 18 (05-25-23 @ 13:52) (17 - 20)  SpO2: 98% (05-25-23 @ 13:52) (97% - 99%)  Wt(kg): --Vital Signs Last 24 Hrs  T(C): 37.2 (25 May 2023 13:52), Max: 37.2 (25 May 2023 13:52)  T(F): 98.9 (25 May 2023 13:52), Max: 98.9 (25 May 2023 13:52)  HR: 99 (25 May 2023 13:52) (96 - 102)  BP: 128/82 (25 May 2023 13:52) (111/76 - 148/84)  BP(mean): 101 (24 May 2023 19:59) (90 - 101)  RR: 18 (25 May 2023 13:52) (17 - 20)  SpO2: 98% (25 May 2023 13:52) (97% - 99%)    Parameters below as of 25 May 2023 13:52  Patient On (Oxygen Delivery Method): room air      PHYSICAL EXAM:  General: B/L; no W/R/R, no retractions. Satting well on RA.   Cardiac: +S1/S2; Tachycardic. No murmurs, rubs, gallops.   Gastrointestinal: soft, NT/ND; no rebound or guarding; +BSx4. Ostomy site appears clean/dry.   : Current suprapubic catheter site in place with some surrounding purple skin change and no active drainage. Former suprapubic catheter site open without any visible drainage.  Surrounding skin, moist but without erythema or signs of infection.   Extremities: WWP, no clubbing or cyanosis; no peripheral edema  Musculoskeletal: NROM x4; no joint swelling, tenderness or erythema. L foot drop.   Vascular: 2+ radial, DP/PT pulses B/L  Neurologic: AAOx3  Psychiatric: + anxious at times, concerned about pain.      Consultant(s) Notes Reviewed:  [x ] YES  [ ] NO  Care Discussed with Consultants/Other Providers [ x] YES  [ ] NO    LABS:                        10.6   9.03  )-----------( 356      ( 25 May 2023 08:03 )             35.5     05-25    138  |  105  |  13  ----------------------------<  199<H>  4.5   |  25  |  0.66    Ca    8.5      25 May 2023 08:03  Phos  4.1     05-25  Mg     1.8     05-25    TPro  6.8  /  Alb  3.3  /  TBili  0.2  /  DBili  x   /  AST  14  /  ALT  9<L>  /  AlkPhos  118  05-25      PT/INR - ( 25 May 2023 08:03 )   PT: 12.2 sec;   INR: 1.02          PTT - ( 25 May 2023 08:03 )  PTT:66.2 sec    CAPILLARY BLOOD GLUCOSE      POCT Blood Glucose.: 209 mg/dL (25 May 2023 13:30)  POCT Blood Glucose.: 191 mg/dL (25 May 2023 09:46)  POCT Blood Glucose.: 299 mg/dL (24 May 2023 22:32)  POCT Blood Glucose.: 403 mg/dL (24 May 2023 20:02)  POCT Blood Glucose.: 385 mg/dL (24 May 2023 18:17)            RADIOLOGY & ADDITIONAL TESTS:    Imaging Personally Reviewed:  [ ] YES  [ ] NO

## 2023-05-26 LAB
ALBUMIN SERPL ELPH-MCNC: 3 G/DL — LOW (ref 3.3–5)
ALP SERPL-CCNC: 118 U/L — SIGNIFICANT CHANGE UP (ref 40–120)
ALT FLD-CCNC: 11 U/L — SIGNIFICANT CHANGE UP (ref 10–45)
ANION GAP SERPL CALC-SCNC: 11 MMOL/L — SIGNIFICANT CHANGE UP (ref 5–17)
AST SERPL-CCNC: 21 U/L — SIGNIFICANT CHANGE UP (ref 10–40)
BASOPHILS # BLD AUTO: 0.03 K/UL — SIGNIFICANT CHANGE UP (ref 0–0.2)
BASOPHILS NFR BLD AUTO: 0.4 % — SIGNIFICANT CHANGE UP (ref 0–2)
BILIRUB SERPL-MCNC: 0.2 MG/DL — SIGNIFICANT CHANGE UP (ref 0.2–1.2)
BUN SERPL-MCNC: 12 MG/DL — SIGNIFICANT CHANGE UP (ref 7–23)
CALCIUM SERPL-MCNC: 8.6 MG/DL — SIGNIFICANT CHANGE UP (ref 8.4–10.5)
CHLORIDE SERPL-SCNC: 99 MMOL/L — SIGNIFICANT CHANGE UP (ref 96–108)
CO2 SERPL-SCNC: 21 MMOL/L — LOW (ref 22–31)
CREAT SERPL-MCNC: 0.7 MG/DL — SIGNIFICANT CHANGE UP (ref 0.5–1.3)
EGFR: 110 ML/MIN/1.73M2 — SIGNIFICANT CHANGE UP
EOSINOPHIL # BLD AUTO: 0.14 K/UL — SIGNIFICANT CHANGE UP (ref 0–0.5)
EOSINOPHIL NFR BLD AUTO: 2 % — SIGNIFICANT CHANGE UP (ref 0–6)
GENTAMICIN TROUGH SERPL-MCNC: 2.9 UG/ML — CRITICAL HIGH (ref 0–2)
GLUCOSE BLDC GLUCOMTR-MCNC: 208 MG/DL — HIGH (ref 70–99)
GLUCOSE BLDC GLUCOMTR-MCNC: 217 MG/DL — HIGH (ref 70–99)
GLUCOSE BLDC GLUCOMTR-MCNC: 249 MG/DL — HIGH (ref 70–99)
GLUCOSE BLDC GLUCOMTR-MCNC: 377 MG/DL — HIGH (ref 70–99)
GLUCOSE SERPL-MCNC: 245 MG/DL — HIGH (ref 70–99)
HCT VFR BLD CALC: 37.6 % — SIGNIFICANT CHANGE UP (ref 34.5–45)
HGB BLD-MCNC: 11.4 G/DL — LOW (ref 11.5–15.5)
IMM GRANULOCYTES NFR BLD AUTO: 0.4 % — SIGNIFICANT CHANGE UP (ref 0–0.9)
LYMPHOCYTES # BLD AUTO: 2.28 K/UL — SIGNIFICANT CHANGE UP (ref 1–3.3)
LYMPHOCYTES # BLD AUTO: 32.4 % — SIGNIFICANT CHANGE UP (ref 13–44)
MAGNESIUM SERPL-MCNC: 1.7 MG/DL — SIGNIFICANT CHANGE UP (ref 1.6–2.6)
MCHC RBC-ENTMCNC: 24.2 PG — LOW (ref 27–34)
MCHC RBC-ENTMCNC: 30.3 GM/DL — LOW (ref 32–36)
MCV RBC AUTO: 79.7 FL — LOW (ref 80–100)
MONOCYTES # BLD AUTO: 0.51 K/UL — SIGNIFICANT CHANGE UP (ref 0–0.9)
MONOCYTES NFR BLD AUTO: 7.3 % — SIGNIFICANT CHANGE UP (ref 2–14)
NEUTROPHILS # BLD AUTO: 4.04 K/UL — SIGNIFICANT CHANGE UP (ref 1.8–7.4)
NEUTROPHILS NFR BLD AUTO: 57.5 % — SIGNIFICANT CHANGE UP (ref 43–77)
NRBC # BLD: 0 /100 WBCS — SIGNIFICANT CHANGE UP (ref 0–0)
PHOSPHATE SERPL-MCNC: 2.8 MG/DL — SIGNIFICANT CHANGE UP (ref 2.5–4.5)
PLATELET # BLD AUTO: 362 K/UL — SIGNIFICANT CHANGE UP (ref 150–400)
POTASSIUM SERPL-MCNC: 4.7 MMOL/L — SIGNIFICANT CHANGE UP (ref 3.5–5.3)
POTASSIUM SERPL-SCNC: 4.7 MMOL/L — SIGNIFICANT CHANGE UP (ref 3.5–5.3)
PROT SERPL-MCNC: 6.5 G/DL — SIGNIFICANT CHANGE UP (ref 6–8.3)
RBC # BLD: 4.72 M/UL — SIGNIFICANT CHANGE UP (ref 3.8–5.2)
RBC # FLD: 16.1 % — HIGH (ref 10.3–14.5)
SODIUM SERPL-SCNC: 131 MMOL/L — LOW (ref 135–145)
WBC # BLD: 7.03 K/UL — SIGNIFICANT CHANGE UP (ref 3.8–10.5)
WBC # FLD AUTO: 7.03 K/UL — SIGNIFICANT CHANGE UP (ref 3.8–10.5)

## 2023-05-26 PROCEDURE — 99232 SBSQ HOSP IP/OBS MODERATE 35: CPT | Mod: GC

## 2023-05-26 PROCEDURE — 99232 SBSQ HOSP IP/OBS MODERATE 35: CPT

## 2023-05-26 RX ORDER — GENTAMICIN SULFATE 40 MG/ML
300 VIAL (ML) INJECTION ONCE
Refills: 0 | Status: DISCONTINUED | OUTPATIENT
Start: 2023-05-26 | End: 2023-05-26

## 2023-05-26 RX ORDER — INSULIN GLARGINE 100 [IU]/ML
60 INJECTION, SOLUTION SUBCUTANEOUS
Refills: 0 | Status: DISCONTINUED | OUTPATIENT
Start: 2023-05-26 | End: 2023-05-27

## 2023-05-26 RX ORDER — MAGNESIUM SULFATE 500 MG/ML
2 VIAL (ML) INJECTION ONCE
Refills: 0 | Status: COMPLETED | OUTPATIENT
Start: 2023-05-26 | End: 2023-05-26

## 2023-05-26 RX ORDER — HYDROMORPHONE HYDROCHLORIDE 2 MG/ML
0.25 INJECTION INTRAMUSCULAR; INTRAVENOUS; SUBCUTANEOUS ONCE
Refills: 0 | Status: DISCONTINUED | OUTPATIENT
Start: 2023-05-26 | End: 2023-05-26

## 2023-05-26 RX ORDER — GENTAMICIN SULFATE 40 MG/ML
400 VIAL (ML) INJECTION ONCE
Refills: 0 | Status: COMPLETED | OUTPATIENT
Start: 2023-05-26 | End: 2023-05-26

## 2023-05-26 RX ORDER — APIXABAN 2.5 MG/1
5 TABLET, FILM COATED ORAL EVERY 12 HOURS
Refills: 0 | Status: DISCONTINUED | OUTPATIENT
Start: 2023-05-26 | End: 2023-06-01

## 2023-05-26 RX ORDER — INSULIN GLARGINE 100 [IU]/ML
60 INJECTION, SOLUTION SUBCUTANEOUS EVERY MORNING
Refills: 0 | Status: DISCONTINUED | OUTPATIENT
Start: 2023-05-26 | End: 2023-05-26

## 2023-05-26 RX ORDER — INSULIN GLARGINE 100 [IU]/ML
40 INJECTION, SOLUTION SUBCUTANEOUS ONCE
Refills: 0 | Status: COMPLETED | OUTPATIENT
Start: 2023-05-26 | End: 2023-05-26

## 2023-05-26 RX ORDER — HYDROMORPHONE HYDROCHLORIDE 2 MG/ML
0.5 INJECTION INTRAMUSCULAR; INTRAVENOUS; SUBCUTANEOUS ONCE
Refills: 0 | Status: DISCONTINUED | OUTPATIENT
Start: 2023-05-26 | End: 2023-05-26

## 2023-05-26 RX ORDER — HYDROMORPHONE HYDROCHLORIDE 2 MG/ML
0.5 INJECTION INTRAMUSCULAR; INTRAVENOUS; SUBCUTANEOUS EVERY 8 HOURS
Refills: 0 | Status: DISCONTINUED | OUTPATIENT
Start: 2023-05-26 | End: 2023-05-28

## 2023-05-26 RX ADMIN — Medication 200 MILLIGRAM(S): at 18:33

## 2023-05-26 RX ADMIN — HYDROMORPHONE HYDROCHLORIDE 0.25 MILLIGRAM(S): 2 INJECTION INTRAMUSCULAR; INTRAVENOUS; SUBCUTANEOUS at 02:04

## 2023-05-26 RX ADMIN — OXYCODONE HYDROCHLORIDE 5 MILLIGRAM(S): 5 TABLET ORAL at 00:43

## 2023-05-26 RX ADMIN — OXYCODONE HYDROCHLORIDE 10 MILLIGRAM(S): 5 TABLET ORAL at 03:05

## 2023-05-26 RX ADMIN — HYDROMORPHONE HYDROCHLORIDE 0.5 MILLIGRAM(S): 2 INJECTION INTRAMUSCULAR; INTRAVENOUS; SUBCUTANEOUS at 09:31

## 2023-05-26 RX ADMIN — Medication 200 MILLIGRAM(S): at 00:43

## 2023-05-26 RX ADMIN — Medication 2 UNIT(S): at 18:32

## 2023-05-26 RX ADMIN — Medication 200 MILLIGRAM(S): at 12:29

## 2023-05-26 RX ADMIN — HYDROMORPHONE HYDROCHLORIDE 0.5 MILLIGRAM(S): 2 INJECTION INTRAMUSCULAR; INTRAVENOUS; SUBCUTANEOUS at 16:26

## 2023-05-26 RX ADMIN — OXYCODONE HYDROCHLORIDE 10 MILLIGRAM(S): 5 TABLET ORAL at 12:29

## 2023-05-26 RX ADMIN — Medication 62.5 MILLIMOLE(S): at 15:01

## 2023-05-26 RX ADMIN — OXYCODONE HYDROCHLORIDE 10 MILLIGRAM(S): 5 TABLET ORAL at 13:29

## 2023-05-26 RX ADMIN — ENOXAPARIN SODIUM 40 MILLIGRAM(S): 100 INJECTION SUBCUTANEOUS at 07:00

## 2023-05-26 RX ADMIN — Medication 200 MILLIGRAM(S): at 07:03

## 2023-05-26 RX ADMIN — OXYCODONE HYDROCHLORIDE 10 MILLIGRAM(S): 5 TABLET ORAL at 04:05

## 2023-05-26 RX ADMIN — HYDROMORPHONE HYDROCHLORIDE 0.5 MILLIGRAM(S): 2 INJECTION INTRAMUSCULAR; INTRAVENOUS; SUBCUTANEOUS at 16:41

## 2023-05-26 RX ADMIN — ALBUTEROL 1 PUFF(S): 90 AEROSOL, METERED ORAL at 09:17

## 2023-05-26 RX ADMIN — Medication 100 MILLIGRAM(S): at 07:01

## 2023-05-26 RX ADMIN — Medication 10: at 18:31

## 2023-05-26 RX ADMIN — APIXABAN 5 MILLIGRAM(S): 2.5 TABLET, FILM COATED ORAL at 18:30

## 2023-05-26 RX ADMIN — Medication 100 MILLIGRAM(S): at 18:30

## 2023-05-26 RX ADMIN — Medication 4: at 09:35

## 2023-05-26 RX ADMIN — Medication 2 UNIT(S): at 13:22

## 2023-05-26 RX ADMIN — INSULIN GLARGINE 60 UNIT(S): 100 INJECTION, SOLUTION SUBCUTANEOUS at 23:08

## 2023-05-26 RX ADMIN — Medication 25 GRAM(S): at 12:29

## 2023-05-26 RX ADMIN — OXYCODONE HYDROCHLORIDE 5 MILLIGRAM(S): 5 TABLET ORAL at 23:12

## 2023-05-26 RX ADMIN — INSULIN GLARGINE 20 UNIT(S): 100 INJECTION, SOLUTION SUBCUTANEOUS at 09:16

## 2023-05-26 RX ADMIN — Medication 2 UNIT(S): at 09:36

## 2023-05-26 RX ADMIN — OXYCODONE HYDROCHLORIDE 10 MILLIGRAM(S): 5 TABLET ORAL at 07:01

## 2023-05-26 RX ADMIN — INSULIN GLARGINE 40 UNIT(S): 100 INJECTION, SOLUTION SUBCUTANEOUS at 14:50

## 2023-05-26 RX ADMIN — Medication 4: at 13:21

## 2023-05-26 RX ADMIN — HYDROMORPHONE HYDROCHLORIDE 0.5 MILLIGRAM(S): 2 INJECTION INTRAMUSCULAR; INTRAVENOUS; SUBCUTANEOUS at 09:16

## 2023-05-26 RX ADMIN — Medication 4: at 22:33

## 2023-05-26 NOTE — PROGRESS NOTE ADULT - PROBLEM SELECTOR PLAN 1
Px with worsening lower abd pain, drainage from suprapubic catheter site. Meeting 2/4 SIRS (HR, WBC) with elevated lactate 5.3 (cleared after fluids to 1.7). CTAP largely unremarkable. On exam, current cath site with surrounding skin changes and old site open with visible underlying catheter. Likely source UTI given positive UA, hx of UTI, and prior urine cx growing carbapenem-resistant and ESBL Klebsiella. s/p 2L NS, Gentamicin in ED (did not get Vancomycin).     - s/p IR replacement of suprapubic tube iso complicated UTI/sepsis (5/25)  - c/w Gentamicin 400mg (weight based) w AM level (goal <2)  - Suprapubic Cath cultures (5/22): +Klebsiella, + Ecoli, pending susceptibilities   - Blood cultures (5/23) NGTD   - Pain management: Tylenol 975mg q8hrs, Ibuprofen 200mg q6hrs, PRN oxy (moderate 5mg q6, severe 10mg q4) w breakthrough pain (0.5 Dilaudid q 8hrs)  - Urology following, f/u recs   - ID following, f/u recs

## 2023-05-26 NOTE — PROGRESS NOTE ADULT - ASSESSMENT
44 yo F with PMHx asthma, hx PE (Eliquis), DM, HTN, HLD, hx abdominal necrotizing fasciitis (s/p suprapubic catheter, ostomy placement in 11/2021) px to [facility] ED from home with worsening abd/suprapubic pain and drainage from prior suprapubic catheter insertion site found to have catheter induced urinary tract infection. ID consulted for antibiotic recommendation given patient's history of abx resistance.    Relevant data: leukocytosis (11.87, but stable from yesterday 11.08), UA from 5/22 (+) for nitrites, bacteria, WBC, and RBC, lactate downtrending (on admission 5.3-->1.7 today), CT abd/pelvis showed suprapubic pigtail catheter within collapsed urinary bladder, R ovarian simple cyst, b/l non obstructive kidney stones, urine culture (+) for lactose fermenting gram negative rods-- identification and susceptibility pending; blood culture negative for any growth  patient undergwent exchange w/ IR for suprapubic cath. Klebsiella growing on culture, likely MDR organism i/s/o hx.       Recommendations:  - F/u Klebsiella susceptibility to guide antibiotic therapy  - switch Gentamicin to 400mg dose at 6pm and check trough levels with AM labs  - obtain trough 10-12 hours after Gentamicin dose  - will require 14 day course of therapy, pending susceptibilities    Team 1 will continue to follow

## 2023-05-26 NOTE — PROGRESS NOTE ADULT - PROBLEM SELECTOR PLAN 3
Hx of DM. Prior hospital course c/b labile blood glucose with endocrine consulted. Last A1C 12 prior admission. Home regimen per prior discharge includes Humulin 100U TID.     - mISS  - Start lantus 60 BID + lispro 2u TID.   - Diet, Consistent Carb

## 2023-05-26 NOTE — PROGRESS NOTE ADULT - PROBLEM SELECTOR PLAN 7
CTPE on 4/4 showing bilateral PEs. Home regimen includes Eliquis 5mg Q12.    - Holding Eliquis pending possible suprapubic catheter exchange procedure  - Hep on hold for procedure

## 2023-05-26 NOTE — PROGRESS NOTE ADULT - PROBLEM SELECTOR PLAN 9
RESOLVED   Initial labs remarkable for K 2.6, repleted with potassium Cl 10mEq IVPB x3 with repeat potassium 3.1. EKG showing sinus tachycardia without prolonged QTC or any other changes. Pt reports decreased PO intake 2/2 pain but denies any other insensible losses, no increased ostomy output.     - Replete K as necessary w daily BMPs

## 2023-05-26 NOTE — PROGRESS NOTE ADULT - PROBLEM SELECTOR PLAN 10
F: s/p 2L NS in ED  E: Replete PRN  DVT ppx: Eliqius   GI ppx: None  Bowel: None  Dispo: [facility]    PCP: Dr. Pelon Sánchez ([place])     FULL CODE

## 2023-05-26 NOTE — PROGRESS NOTE ADULT - ATTENDING COMMENTS
43F w obesity, IDDM2, PE on eliquis, HTN, HLD, h/o abdominal necrotizing fasciitis s/p pelvic reconstruction, suprapubic catheter, ostomy placement 11/2021 p/w worsening abd/suprapubic pain and drainage from prior suprapubic catheter insertion site found to have sepsis d/t UTI and catheter site infection, s/p catheter replacement 5/26 w IR - ID following - on IV Gent - growing meropenem resistant Klebsiella, and E coli    Pt reports worsening lower abdominal pain [place] when she stands - feeling that abdomen is pulling down more. Denies new numbness, weakness. Reports that she was previously on more pain medication many months ago and that she 'decreased them' and came off of pain medications. No fever.    #UTI - ID following  #Chronic suprapubic catheter - replaced by IR 5/25  #IDDM2 - home on regular insulin 100u (U-500) q8h. glucose has been above target.   #Obesity - BMI 36  #PE - restarted on eliquis 5mg bid    Plan  Since pt NPO and glucose remains above target, will increase lantus to 60u BID. f/u 24h insulin usage and add premeal insulin as necessary.   f/u ID recs regarding abx  Would continue current pain regimen as of this time - exam in suprapubic area wo significant erythema or drainage noted. Pt at this time not amenable to urethral catheter d/t issues following pelvic reconstruction.   f/u urology recs.      -***bowel regimen while on opiates.    DISPO: HPT

## 2023-05-26 NOTE — PROGRESS NOTE ADULT - SUBJECTIVE AND OBJECTIVE BOX
INFECTIOUS DISEASES CONSULT FOLLOW-UP NOTE    INTERVAL HPI/OVERNIGHT EVENTS:      ROS:   Constitutional, eyes, ENT, cardiovascular, respiratory, gastrointestinal, genitourinary, integumentary, neurological, psychiatric and heme/lymph are otherwise negative other than noted above       ANTIBIOTICS/RELEVANT:    MEDICATIONS  (STANDING):  acetaminophen     Tablet .. 975 milliGRAM(s) Oral every 8 hours  albuterol    90 MICROgram(s) HFA Inhaler 1 Puff(s) Inhalation daily  apixaban 5 milliGRAM(s) Oral every 12 hours  dextrose 5%. 1000 milliLiter(s) (50 mL/Hr) IV Continuous <Continuous>  dextrose 5%. 1000 milliLiter(s) (100 mL/Hr) IV Continuous <Continuous>  dextrose 50% Injectable 25 Gram(s) IV Push once  dextrose 50% Injectable 12.5 Gram(s) IV Push once  dextrose 50% Injectable 25 Gram(s) IV Push once  gentamicin   IVPB 400 milliGRAM(s) IV Intermittent once  glucagon  Injectable 1 milliGRAM(s) IntraMuscular once  ibuprofen  Tablet. 200 milliGRAM(s) Oral every 6 hours  insulin glargine Injectable (LANTUS) 40 Unit(s) SubCutaneous once  insulin glargine Injectable (LANTUS) 60 Unit(s) SubCutaneous two times a day  insulin lispro (ADMELOG) corrective regimen sliding scale   SubCutaneous Before meals and at bedtime  insulin lispro Injectable (ADMELOG) 2 Unit(s) SubCutaneous three times a day before meals  labetalol 100 milliGRAM(s) Oral two times a day  sodium phosphate 15 milliMole(s)/250 mL IVPB 15 milliMole(s) IV Intermittent once    MEDICATIONS  (PRN):  dextrose Oral Gel 15 Gram(s) Oral once PRN Blood Glucose LESS THAN 70 milliGRAM(s)/deciliter  oxyCODONE    IR 5 milliGRAM(s) Oral every 6 hours PRN Moderate Pain (4 - 6)  oxyCODONE    IR 10 milliGRAM(s) Oral every 4 hours PRN Severe Pain (7 - 10)        Vital Signs Last 24 Hrs  T(C): 37 (26 May 2023 06:21), Max: 37.2 (25 May 2023 13:52)  T(F): 98.6 (26 May 2023 06:21), Max: 98.9 (25 May 2023 13:52)  HR: 84 (26 May 2023 06:21) (84 - 113)  BP: 128/76 (26 May 2023 06:21) (122/69 - 128/82)  BP(mean): --  RR: 18 (26 May 2023 06:21) (18 - 18)  SpO2: 99% (26 May 2023 06:21) (98% - 99%)    Parameters below as of 26 May 2023 06:21  Patient On (Oxygen Delivery Method): room air        PHYSICAL EXAM:  Constitutional: alert, NAD  Eyes: the sclera and conjunctiva were normal.   ENT: the ears and nose were normal in appearance.   Neck: the appearance of the neck was normal and the neck was supple.   Pulmonary: no respiratory distress and lungs were clear to auscultation bilaterally.   Heart: heart rate was normal and rhythm regular, normal S1 and S2  Vascular:. there was no peripheral edema  Abdomen: normal bowel sounds, soft, non-tender  Neurological: no focal deficits.   Psychiatric: the affect was normal        LABS:                        11.4   7.03  )-----------( 362      ( 26 May 2023 07:16 )             37.6     05-26    131<L>  |  99  |  12  ----------------------------<  245<H>  4.7   |  21<L>  |  0.70    Ca    8.6      26 May 2023 07:16  Phos  2.8     05-26  Mg     1.7     05-26    TPro  6.5  /  Alb  3.0<L>  /  TBili  0.2  /  DBili  x   /  AST  21  /  ALT  11  /  AlkPhos  118  05-26    PT/INR - ( 25 May 2023 08:03 )   PT: 12.2 sec;   INR: 1.02          PTT - ( 25 May 2023 08:03 )  PTT:66.2 sec      MICROBIOLOGY:      RADIOLOGY & ADDITIONAL STUDIES:  Reviewed

## 2023-05-26 NOTE — PROGRESS NOTE ADULT - PROBLEM SELECTOR PLAN 2
Admitted for severe sepsis 2/2 UTI. UA positive showing small LE, Positive nitrites, >10 WBC. Prior urine cx growing carbapenem-resistant and ESBL Klebsiella. s/p 2L NS, Gentamicin in ED (did not get Vancomycin). Tx with Gentamicin x5d on previous admission. Hx of suprapubic catheter placed in 11/2021 at [facility] as complication of abdominal necrotizing fasciitis, last exchanged at [facility] on 3/24. Current catheter site with some surrounding skin changes and old catheter site now open with visible catheter underneath.     - Plan as above

## 2023-05-26 NOTE — PROGRESS NOTE ADULT - ATTENDING COMMENTS
Continue Gentamicin 400 mg IV daily.  Check gentamicin level 6-14 hrs after the dose (can be done with AM labs).  Further dosing recommendations based on that.  Will recommend 14 day course of treatment (this is based on patient report that she does not respond well to 7 days).  Discharge antibiotic depends on sensitivities

## 2023-05-26 NOTE — PROGRESS NOTE ADULT - SUBJECTIVE AND OBJECTIVE BOX
HASMUKH AGUILERA  43y  Female    Overnight: s/p suprapubic cath exchange    Subjective: Patient examined at bedside. Patient w reported pain in suprapubic area s/p suprapubic cath exchange given 0.25 Dilaudid o/n with additional 0.5 Dilaudid this AM which resolved her pain. Patient reports limited pain this AM w regimen. Otherwise patient resting comfortably without any other complaints.       T(C): 37 (05-26-23 @ 13:56), Max: 37.1 (05-25-23 @ 22:09)  HR: 88 (05-26-23 @ 13:56) (84 - 113)  BP: 126/72 (05-26-23 @ 13:56) (122/69 - 128/76)  RR: 16 (05-26-23 @ 13:56) (16 - 18)  SpO2: 96% (05-26-23 @ 13:56) (96% - 99%)  Wt(kg): --Vital Signs Last 24 Hrs  T(C): 37 (26 May 2023 13:56), Max: 37.1 (25 May 2023 22:09)  T(F): 98.6 (26 May 2023 13:56), Max: 98.7 (25 May 2023 22:09)  HR: 88 (26 May 2023 13:56) (84 - 113)  BP: 126/72 (26 May 2023 13:56) (122/69 - 128/76)  BP(mean): --  RR: 16 (26 May 2023 13:56) (16 - 18)  SpO2: 96% (26 May 2023 13:56) (96% - 99%)    Parameters below as of 26 May 2023 13:56  Patient On (Oxygen Delivery Method): room air        PHYSICAL EXAM:  General: B/L; no W/R/R, no retractions. Satting well on RA.   Cardiac: +S1/S2; Tachycardic. No murmurs, rubs, gallops.   Gastrointestinal: soft, NT/ND; no rebound or guarding; +BSx4. Ostomy site appears clean/dry.   : Current suprapubic catheter site in place s/p replacement on 5/25 with bandage overlying wound without significant bloody drainage. Former suprapubic catheter site open without any visible drainage.  Surrounding skin, moist but without erythema or signs of infection.   Extremities: WWP, no clubbing or cyanosis; no peripheral edema  Musculoskeletal: NROM x4; no joint swelling, tenderness or erythema. L foot drop.   Vascular: 2+ radial, DP/PT pulses B/L  Neurologic: AAOx3    Consultant(s) Notes Reviewed:  [x ] YES  [ ] NO  Care Discussed with Consultants/Other Providers [ x] YES  [ ] NO    LABS:                        11.4   7.03  )-----------( 362      ( 26 May 2023 07:16 )             37.6     05-26    131<L>  |  99  |  12  ----------------------------<  245<H>  4.7   |  21<L>  |  0.70    Ca    8.6      26 May 2023 07:16  Phos  2.8     05-26  Mg     1.7     05-26    TPro  6.5  /  Alb  3.0<L>  /  TBili  0.2  /  DBili  x   /  AST  21  /  ALT  11  /  AlkPhos  118  05-26      PT/INR - ( 25 May 2023 08:03 )   PT: 12.2 sec;   INR: 1.02          PTT - ( 25 May 2023 08:03 )  PTT:66.2 sec    CAPILLARY BLOOD GLUCOSE      POCT Blood Glucose.: 208 mg/dL (26 May 2023 13:07)  POCT Blood Glucose.: 217 mg/dL (26 May 2023 09:16)  POCT Blood Glucose.: 319 mg/dL (25 May 2023 22:20)  POCT Blood Glucose.: 195 mg/dL (25 May 2023 18:28)            RADIOLOGY & ADDITIONAL TESTS:    Imaging Personally Reviewed:  [ ] YES  [ ] NO

## 2023-05-26 NOTE — PROGRESS NOTE ADULT - ASSESSMENT
42 yo F with PMHx asthma, hx PE (Eliquis), DM, HTN, HLD, hx abdominal necrotizing fasciitis (s/p suprapubic catheter, ostomy placement in 11/2021) px to [facility] ED from home with worsening abd/suprapubic pain and drainage from prior suprapubic catheter insertion site found to have sepsis sepsis 2/2 UTI and catheter-site infection.

## 2023-05-27 LAB
GENTAMICIN SERPL-MCNC: 2.6 UG/ML
GLUCOSE BLDC GLUCOMTR-MCNC: 186 MG/DL — HIGH (ref 70–99)
GLUCOSE BLDC GLUCOMTR-MCNC: 241 MG/DL — HIGH (ref 70–99)
GLUCOSE BLDC GLUCOMTR-MCNC: 282 MG/DL — HIGH (ref 70–99)
GLUCOSE BLDC GLUCOMTR-MCNC: 307 MG/DL — HIGH (ref 70–99)
GLUCOSE BLDC GLUCOMTR-MCNC: 351 MG/DL — HIGH (ref 70–99)

## 2023-05-27 PROCEDURE — 99232 SBSQ HOSP IP/OBS MODERATE 35: CPT

## 2023-05-27 PROCEDURE — 99233 SBSQ HOSP IP/OBS HIGH 50: CPT

## 2023-05-27 RX ORDER — GENTAMICIN SULFATE 40 MG/ML
400 VIAL (ML) INJECTION ONCE
Refills: 0 | Status: COMPLETED | OUTPATIENT
Start: 2023-05-27 | End: 2023-05-27

## 2023-05-27 RX ORDER — INSULIN LISPRO 100/ML
6 VIAL (ML) SUBCUTANEOUS
Refills: 0 | Status: DISCONTINUED | OUTPATIENT
Start: 2023-05-27 | End: 2023-05-28

## 2023-05-27 RX ORDER — ERTAPENEM SODIUM 1 G/1
1000 INJECTION, POWDER, LYOPHILIZED, FOR SOLUTION INTRAMUSCULAR; INTRAVENOUS EVERY 24 HOURS
Refills: 0 | Status: DISCONTINUED | OUTPATIENT
Start: 2023-05-27 | End: 2023-06-01

## 2023-05-27 RX ORDER — INSULIN GLARGINE 100 [IU]/ML
65 INJECTION, SOLUTION SUBCUTANEOUS
Refills: 0 | Status: DISCONTINUED | OUTPATIENT
Start: 2023-05-27 | End: 2023-05-28

## 2023-05-27 RX ORDER — GENTAMICIN SULFATE 40 MG/ML
400 VIAL (ML) INJECTION EVERY 24 HOURS
Refills: 0 | Status: DISCONTINUED | OUTPATIENT
Start: 2023-05-28 | End: 2023-06-01

## 2023-05-27 RX ADMIN — Medication 6: at 09:22

## 2023-05-27 RX ADMIN — Medication 4: at 13:56

## 2023-05-27 RX ADMIN — INSULIN GLARGINE 65 UNIT(S): 100 INJECTION, SOLUTION SUBCUTANEOUS at 22:37

## 2023-05-27 RX ADMIN — Medication 200 MILLIGRAM(S): at 07:40

## 2023-05-27 RX ADMIN — HYDROMORPHONE HYDROCHLORIDE 0.5 MILLIGRAM(S): 2 INJECTION INTRAMUSCULAR; INTRAVENOUS; SUBCUTANEOUS at 02:15

## 2023-05-27 RX ADMIN — HYDROMORPHONE HYDROCHLORIDE 0.5 MILLIGRAM(S): 2 INJECTION INTRAMUSCULAR; INTRAVENOUS; SUBCUTANEOUS at 02:30

## 2023-05-27 RX ADMIN — Medication 100 MILLIGRAM(S): at 06:36

## 2023-05-27 RX ADMIN — Medication 2 UNIT(S): at 13:57

## 2023-05-27 RX ADMIN — OXYCODONE HYDROCHLORIDE 10 MILLIGRAM(S): 5 TABLET ORAL at 16:46

## 2023-05-27 RX ADMIN — Medication 200 MILLIGRAM(S): at 19:36

## 2023-05-27 RX ADMIN — Medication 200 MILLIGRAM(S): at 21:34

## 2023-05-27 RX ADMIN — Medication 100 MILLIGRAM(S): at 17:26

## 2023-05-27 RX ADMIN — HYDROMORPHONE HYDROCHLORIDE 0.5 MILLIGRAM(S): 2 INJECTION INTRAMUSCULAR; INTRAVENOUS; SUBCUTANEOUS at 11:00

## 2023-05-27 RX ADMIN — Medication 200 MILLIGRAM(S): at 15:18

## 2023-05-27 RX ADMIN — Medication 2 UNIT(S): at 09:22

## 2023-05-27 RX ADMIN — Medication 6 UNIT(S): at 18:20

## 2023-05-27 RX ADMIN — ERTAPENEM SODIUM 120 MILLIGRAM(S): 1 INJECTION, POWDER, LYOPHILIZED, FOR SOLUTION INTRAMUSCULAR; INTRAVENOUS at 17:25

## 2023-05-27 RX ADMIN — OXYCODONE HYDROCHLORIDE 10 MILLIGRAM(S): 5 TABLET ORAL at 23:06

## 2023-05-27 RX ADMIN — OXYCODONE HYDROCHLORIDE 10 MILLIGRAM(S): 5 TABLET ORAL at 09:26

## 2023-05-27 RX ADMIN — Medication 200 MILLIGRAM(S): at 02:25

## 2023-05-27 RX ADMIN — OXYCODONE HYDROCHLORIDE 10 MILLIGRAM(S): 5 TABLET ORAL at 10:25

## 2023-05-27 RX ADMIN — Medication 200 MILLIGRAM(S): at 14:21

## 2023-05-27 RX ADMIN — Medication 200 MILLIGRAM(S): at 03:24

## 2023-05-27 RX ADMIN — Medication 975 MILLIGRAM(S): at 15:18

## 2023-05-27 RX ADMIN — INSULIN GLARGINE 60 UNIT(S): 100 INJECTION, SOLUTION SUBCUTANEOUS at 10:51

## 2023-05-27 RX ADMIN — HYDROMORPHONE HYDROCHLORIDE 0.5 MILLIGRAM(S): 2 INJECTION INTRAMUSCULAR; INTRAVENOUS; SUBCUTANEOUS at 11:15

## 2023-05-27 RX ADMIN — ALBUTEROL 1 PUFF(S): 90 AEROSOL, METERED ORAL at 10:50

## 2023-05-27 RX ADMIN — APIXABAN 5 MILLIGRAM(S): 2.5 TABLET, FILM COATED ORAL at 06:36

## 2023-05-27 RX ADMIN — OXYCODONE HYDROCHLORIDE 10 MILLIGRAM(S): 5 TABLET ORAL at 15:49

## 2023-05-27 RX ADMIN — Medication 975 MILLIGRAM(S): at 14:20

## 2023-05-27 RX ADMIN — APIXABAN 5 MILLIGRAM(S): 2.5 TABLET, FILM COATED ORAL at 17:26

## 2023-05-27 RX ADMIN — OXYCODONE HYDROCHLORIDE 10 MILLIGRAM(S): 5 TABLET ORAL at 22:36

## 2023-05-27 RX ADMIN — OXYCODONE HYDROCHLORIDE 5 MILLIGRAM(S): 5 TABLET ORAL at 00:12

## 2023-05-27 RX ADMIN — Medication 8: at 22:36

## 2023-05-27 RX ADMIN — Medication 10: at 18:21

## 2023-05-27 NOTE — PROGRESS NOTE ADULT - ASSESSMENT
42 yo F with PMHx asthma, hx PE (Eliquis), DM, HTN, HLD, hx abdominal necrotizing fasciitis (s/p suprapubic catheter, ostomy placement in 11/2021) px to [facility] ED from home with worsening abd/suprapubic pain and drainage from prior suprapubic catheter insertion site found to have sepsis sepsis 2/2 UTI and catheter-site infection.

## 2023-05-27 NOTE — PROGRESS NOTE ADULT - SUBJECTIVE AND OBJECTIVE BOX
INTERVAL HPI/OVERNIGHT EVENTS:    Patient was seen and examined at bedside.  Feels well.  Tolerating Gentamicin     CONSTITUTIONAL:  Negative fever or chills, feels well, good appetite  EYES:  Negative  blurry vision or double vision  CARDIOVASCULAR:  Negative for chest pain or palpitations  RESPIRATORY:  Negative for cough, wheezing, or SOB   GASTROINTESTINAL:  Negative for nausea, vomiting, diarrhea, constipation, or abdominal pain  GENITOURINARY:  Negative frequency, urgency or dysuria  NEUROLOGIC:  No headache, confusion, dizziness, lightheadedness      ANTIBIOTICS/RELEVANT:    MEDICATIONS  (STANDING):  acetaminophen     Tablet .. 975 milliGRAM(s) Oral every 8 hours  albuterol    90 MICROgram(s) HFA Inhaler 1 Puff(s) Inhalation daily  apixaban 5 milliGRAM(s) Oral every 12 hours  dextrose 5%. 1000 milliLiter(s) (50 mL/Hr) IV Continuous <Continuous>  dextrose 5%. 1000 milliLiter(s) (100 mL/Hr) IV Continuous <Continuous>  dextrose 50% Injectable 25 Gram(s) IV Push once  dextrose 50% Injectable 12.5 Gram(s) IV Push once  dextrose 50% Injectable 25 Gram(s) IV Push once  gentamicin   IVPB 400 milliGRAM(s) IV Intermittent once  glucagon  Injectable 1 milliGRAM(s) IntraMuscular once  ibuprofen  Tablet. 200 milliGRAM(s) Oral every 6 hours  insulin glargine Injectable (LANTUS) 60 Unit(s) SubCutaneous two times a day  insulin lispro (ADMELOG) corrective regimen sliding scale   SubCutaneous Before meals and at bedtime  insulin lispro Injectable (ADMELOG) 2 Unit(s) SubCutaneous three times a day before meals  labetalol 100 milliGRAM(s) Oral two times a day    MEDICATIONS  (PRN):  dextrose Oral Gel 15 Gram(s) Oral once PRN Blood Glucose LESS THAN 70 milliGRAM(s)/deciliter  HYDROmorphone  Injectable 0.5 milliGRAM(s) IV Push every 8 hours PRN for breakthrough pain  oxyCODONE    IR 5 milliGRAM(s) Oral every 6 hours PRN Moderate Pain (4 - 6)  oxyCODONE    IR 10 milliGRAM(s) Oral every 4 hours PRN Severe Pain (7 - 10)        Vital Signs Last 24 Hrs  T(C): 37 (27 May 2023 05:36), Max: 37.2 (26 May 2023 20:30)  T(F): 98.6 (27 May 2023 05:36), Max: 98.9 (26 May 2023 20:30)  HR: 95 (27 May 2023 05:36) (88 - 98)  BP: 141/81 (27 May 2023 05:36) (125/76 - 141/81)  BP(mean): 101 (27 May 2023 05:36) (92 - 101)  RR: 18 (27 May 2023 05:36) (16 - 18)  SpO2: 96% (27 May 2023 05:36) (96% - 98%)    Parameters below as of 27 May 2023 05:36  Patient On (Oxygen Delivery Method): room air        PHYSICAL EXAM:  Constitutional: non-toxic, no distress  Eyes:NAOMI, EOMI  Ear/Nose/Throat: no oral lesion, no sinus tenderness on percussion	  Neck:  supple  Respiratory: CTA cristi  Cardiovascular: S1S2 RRR, no murmurs  Gastrointestinal:soft, (+) BS, no HSM  Extremities:no e/e/c  Vascular: DP Pulse:	right normal; left normal      LABS:                        11.4   7.03  )-----------( 362      ( 26 May 2023 07:16 )             37.6     05-26    131<L>  |  99  |  12  ----------------------------<  245<H>  4.7   |  21<L>  |  0.70    Ca    8.6      26 May 2023 07:16  Phos  2.8     05-26  Mg     1.7     05-26    TPro  6.5  /  Alb  3.0<L>  /  TBili  0.2  /  DBili  x   /  AST  21  /  ALT  11  /  AlkPhos  118  05-26          MICROBIOLOGY:    Culture - Blood (05.23.23 @ 01:00)    Specimen Source: .Blood Blood   Culture Results:   No growth at 4 days.        RADIOLOGY & ADDITIONAL STUDIES:

## 2023-05-27 NOTE — PROGRESS NOTE ADULT - SUBJECTIVE AND OBJECTIVE BOX
O/N Events: no acute events overnight    Subjective/ROS: Patient seen and examined at bedside.     Denies Fever/Chills, HA, CP, SOB, n/v, changes in bowel/urinary habits.  12pt ROS otherwise negative.    VITALS  Vital Signs Last 24 Hrs  T(C): 37.2 (27 May 2023 14:00), Max: 37.2 (27 May 2023 14:00)  T(F): 98.9 (27 May 2023 14:00), Max: 98.9 (27 May 2023 14:00)  HR: 93 (27 May 2023 17:26) (93 - 97)  BP: 132/74 (27 May 2023 17:26) (132/74 - 141/81)  BP(mean): 101 (27 May 2023 05:36) (101 - 101)  RR: 17 (27 May 2023 14:00) (17 - 18)  SpO2: 97% (27 May 2023 14:00) (96% - 97%)    Parameters below as of 27 May 2023 14:00  Patient On (Oxygen Delivery Method): room air    CAPILLARY BLOOD GLUCOSE      POCT Blood Glucose.: 351 mg/dL (27 May 2023 18:04)  POCT Blood Glucose.: 241 mg/dL (27 May 2023 13:41)  POCT Blood Glucose.: 186 mg/dL (27 May 2023 10:49)  POCT Blood Glucose.: 282 mg/dL (27 May 2023 09:05)  POCT Blood Glucose.: 249 mg/dL (26 May 2023 22:22)      PHYSICAL EXAM  General: B/L; no W/R/R, no retractions. Satting well on RA.   Cardiac: +S1/S2; Tachycardic. No murmurs, rubs, gallops.   Gastrointestinal: soft, NT/ND; no rebound or guarding; +BSx4. Ostomy site appears clean/dry.   : Current suprapubic catheter site in place s/p replacement on 5/25 with bandage overlying wound without significant bloody drainage. Former suprapubic catheter site open without any visible drainage.  Surrounding skin, moist but without erythema or signs of infection.   Extremities: WWP, no clubbing or cyanosis; no peripheral edema  Musculoskeletal: NROM x4; no joint swelling, tenderness or erythema. L foot drop.   Vascular: 2+ radial, DP/PT pulses B/L  Neurologic: AAOx3    MEDICATIONS  (STANDING):  acetaminophen     Tablet .. 975 milliGRAM(s) Oral every 8 hours  albuterol    90 MICROgram(s) HFA Inhaler 1 Puff(s) Inhalation daily  apixaban 5 milliGRAM(s) Oral every 12 hours  dextrose 5%. 1000 milliLiter(s) (50 mL/Hr) IV Continuous <Continuous>  dextrose 5%. 1000 milliLiter(s) (100 mL/Hr) IV Continuous <Continuous>  dextrose 50% Injectable 25 Gram(s) IV Push once  dextrose 50% Injectable 12.5 Gram(s) IV Push once  dextrose 50% Injectable 25 Gram(s) IV Push once  ertapenem  IVPB 1000 milliGRAM(s) IV Intermittent every 24 hours  glucagon  Injectable 1 milliGRAM(s) IntraMuscular once  ibuprofen  Tablet. 200 milliGRAM(s) Oral every 6 hours  insulin glargine Injectable (LANTUS) 65 Unit(s) SubCutaneous two times a day  insulin lispro (ADMELOG) corrective regimen sliding scale   SubCutaneous Before meals and at bedtime  insulin lispro Injectable (ADMELOG) 6 Unit(s) SubCutaneous three times a day before meals  labetalol 100 milliGRAM(s) Oral two times a day    MEDICATIONS  (PRN):  dextrose Oral Gel 15 Gram(s) Oral once PRN Blood Glucose LESS THAN 70 milliGRAM(s)/deciliter  HYDROmorphone  Injectable 0.5 milliGRAM(s) IV Push every 8 hours PRN for breakthrough pain  oxyCODONE    IR 5 milliGRAM(s) Oral every 6 hours PRN Moderate Pain (4 - 6)  oxyCODONE    IR 10 milliGRAM(s) Oral every 4 hours PRN Severe Pain (7 - 10)      shellfish. (Hives; Anaphylaxis)  vancomycin (Anaphylaxis; Hives)  penicillin (Hives)  clindamycin (Pruritus)  amoxicillin (Unknown)  Bactrim I.V. (Rash (Mod to Severe))  iodine containing compounds (Hives; Anaphylaxis)  fish (Hives; Urticaria)      LABS                        11.4   7.03  )-----------( 362      ( 26 May 2023 07:16 )             37.6     05-26    131<L>  |  99  |  12  ----------------------------<  245<H>  4.7   |  21<L>  |  0.70    Ca    8.6      26 May 2023 07:16  Phos  2.8     05-26  Mg     1.7     05-26    TPro  6.5  /  Alb  3.0<L>  /  TBili  0.2  /  DBili  x   /  AST  21  /  ALT  11  /  AlkPhos  118  05-26      IMAGING/EKG/ETC

## 2023-05-27 NOTE — PROGRESS NOTE ADULT - ASSESSMENT
IMPRESSION:  Complicated UTI.  Patient reports that in the past she responds better to 14 days (as opposed to 7 days).  Overall doing well.  Urine with E. Coli (ESBL) and CR-klebsiella.  Unfortunately the E. Coli is resistant to Gentamicin.   Unclear significance of the E. Coli but will opt to treat.    Unfortunately there are no PO options for the Klebsiella    Recommend:  1. Continue Gentamicin 400 mg IV daily.  Gent level ok  2. Start Ertapenem 1 gram IV daily to cover E. Coli  3.  Will need a PICC line; ok to place   4.  Recommend 14 days of Gentamicin (ends 6/5/23); recommend 14 days of ertapenem (ends 6/9/23)  5.  Ok to place PICC  6.  She needs CBC, CMP, gent level qweek as outpatient.  Can fax to:  Dr Kauffman [phone]    ID team 1 will sign off.  Reconsult as needed

## 2023-05-28 LAB
ALBUMIN SERPL ELPH-MCNC: 3.3 G/DL — SIGNIFICANT CHANGE UP (ref 3.3–5)
ALP SERPL-CCNC: 126 U/L — HIGH (ref 40–120)
ALT FLD-CCNC: 11 U/L — SIGNIFICANT CHANGE UP (ref 10–45)
ANION GAP SERPL CALC-SCNC: 12 MMOL/L — SIGNIFICANT CHANGE UP (ref 5–17)
AST SERPL-CCNC: 17 U/L — SIGNIFICANT CHANGE UP (ref 10–40)
BASOPHILS # BLD AUTO: 0.03 K/UL — SIGNIFICANT CHANGE UP (ref 0–0.2)
BASOPHILS NFR BLD AUTO: 0.4 % — SIGNIFICANT CHANGE UP (ref 0–2)
BILIRUB SERPL-MCNC: 0.2 MG/DL — SIGNIFICANT CHANGE UP (ref 0.2–1.2)
BUN SERPL-MCNC: 16 MG/DL — SIGNIFICANT CHANGE UP (ref 7–23)
CALCIUM SERPL-MCNC: 8.9 MG/DL — SIGNIFICANT CHANGE UP (ref 8.4–10.5)
CHLORIDE SERPL-SCNC: 98 MMOL/L — SIGNIFICANT CHANGE UP (ref 96–108)
CO2 SERPL-SCNC: 24 MMOL/L — SIGNIFICANT CHANGE UP (ref 22–31)
CREAT SERPL-MCNC: 0.75 MG/DL — SIGNIFICANT CHANGE UP (ref 0.5–1.3)
EGFR: 101 ML/MIN/1.73M2 — SIGNIFICANT CHANGE UP
EOSINOPHIL # BLD AUTO: 0.18 K/UL — SIGNIFICANT CHANGE UP (ref 0–0.5)
EOSINOPHIL NFR BLD AUTO: 2.2 % — SIGNIFICANT CHANGE UP (ref 0–6)
GENTAMICIN TROUGH SERPL-MCNC: 5.9 UG/ML — CRITICAL HIGH (ref 0–2)
GLUCOSE BLDC GLUCOMTR-MCNC: 200 MG/DL — HIGH (ref 70–99)
GLUCOSE BLDC GLUCOMTR-MCNC: 218 MG/DL — HIGH (ref 70–99)
GLUCOSE BLDC GLUCOMTR-MCNC: 256 MG/DL — HIGH (ref 70–99)
GLUCOSE BLDC GLUCOMTR-MCNC: 322 MG/DL — HIGH (ref 70–99)
GLUCOSE SERPL-MCNC: 251 MG/DL — HIGH (ref 70–99)
HCT VFR BLD CALC: 40.7 % — SIGNIFICANT CHANGE UP (ref 34.5–45)
HGB BLD-MCNC: 12.2 G/DL — SIGNIFICANT CHANGE UP (ref 11.5–15.5)
IMM GRANULOCYTES NFR BLD AUTO: 0.5 % — SIGNIFICANT CHANGE UP (ref 0–0.9)
LYMPHOCYTES # BLD AUTO: 2.3 K/UL — SIGNIFICANT CHANGE UP (ref 1–3.3)
LYMPHOCYTES # BLD AUTO: 27.5 % — SIGNIFICANT CHANGE UP (ref 13–44)
MAGNESIUM SERPL-MCNC: 1.7 MG/DL — SIGNIFICANT CHANGE UP (ref 1.6–2.6)
MCHC RBC-ENTMCNC: 24 PG — LOW (ref 27–34)
MCHC RBC-ENTMCNC: 30 GM/DL — LOW (ref 32–36)
MCV RBC AUTO: 80.1 FL — SIGNIFICANT CHANGE UP (ref 80–100)
MONOCYTES # BLD AUTO: 0.61 K/UL — SIGNIFICANT CHANGE UP (ref 0–0.9)
MONOCYTES NFR BLD AUTO: 7.3 % — SIGNIFICANT CHANGE UP (ref 2–14)
NEUTROPHILS # BLD AUTO: 5.2 K/UL — SIGNIFICANT CHANGE UP (ref 1.8–7.4)
NEUTROPHILS NFR BLD AUTO: 62.1 % — SIGNIFICANT CHANGE UP (ref 43–77)
NRBC # BLD: 0 /100 WBCS — SIGNIFICANT CHANGE UP (ref 0–0)
PHOSPHATE SERPL-MCNC: 3.1 MG/DL — SIGNIFICANT CHANGE UP (ref 2.5–4.5)
PLATELET # BLD AUTO: 369 K/UL — SIGNIFICANT CHANGE UP (ref 150–400)
POTASSIUM SERPL-MCNC: 4.5 MMOL/L — SIGNIFICANT CHANGE UP (ref 3.5–5.3)
POTASSIUM SERPL-SCNC: 4.5 MMOL/L — SIGNIFICANT CHANGE UP (ref 3.5–5.3)
PROT SERPL-MCNC: 7.1 G/DL — SIGNIFICANT CHANGE UP (ref 6–8.3)
RBC # BLD: 5.08 M/UL — SIGNIFICANT CHANGE UP (ref 3.8–5.2)
RBC # FLD: 15.9 % — HIGH (ref 10.3–14.5)
SODIUM SERPL-SCNC: 134 MMOL/L — LOW (ref 135–145)
WBC # BLD: 8.36 K/UL — SIGNIFICANT CHANGE UP (ref 3.8–10.5)
WBC # FLD AUTO: 8.36 K/UL — SIGNIFICANT CHANGE UP (ref 3.8–10.5)

## 2023-05-28 PROCEDURE — 36415 COLL VENOUS BLD VENIPUNCTURE: CPT

## 2023-05-28 PROCEDURE — 36000 PLACE NEEDLE IN VEIN: CPT

## 2023-05-28 PROCEDURE — 99232 SBSQ HOSP IP/OBS MODERATE 35: CPT

## 2023-05-28 PROCEDURE — 76937 US GUIDE VASCULAR ACCESS: CPT | Mod: 26

## 2023-05-28 PROCEDURE — 99222 1ST HOSP IP/OBS MODERATE 55: CPT

## 2023-05-28 RX ORDER — INSULIN LISPRO 100/ML
10 VIAL (ML) SUBCUTANEOUS
Refills: 0 | Status: DISCONTINUED | OUTPATIENT
Start: 2023-05-28 | End: 2023-05-30

## 2023-05-28 RX ORDER — IBUPROFEN 200 MG
400 TABLET ORAL EVERY 6 HOURS
Refills: 0 | Status: DISCONTINUED | OUTPATIENT
Start: 2023-05-28 | End: 2023-06-01

## 2023-05-28 RX ORDER — INSULIN GLARGINE 100 [IU]/ML
70 INJECTION, SOLUTION SUBCUTANEOUS AT BEDTIME
Refills: 0 | Status: DISCONTINUED | OUTPATIENT
Start: 2023-05-28 | End: 2023-05-29

## 2023-05-28 RX ORDER — MAGNESIUM SULFATE 500 MG/ML
2 VIAL (ML) INJECTION ONCE
Refills: 0 | Status: COMPLETED | OUTPATIENT
Start: 2023-05-28 | End: 2023-05-28

## 2023-05-28 RX ADMIN — Medication 200 MILLIGRAM(S): at 19:05

## 2023-05-28 RX ADMIN — OXYCODONE HYDROCHLORIDE 10 MILLIGRAM(S): 5 TABLET ORAL at 23:57

## 2023-05-28 RX ADMIN — APIXABAN 5 MILLIGRAM(S): 2.5 TABLET, FILM COATED ORAL at 05:35

## 2023-05-28 RX ADMIN — OXYCODONE HYDROCHLORIDE 10 MILLIGRAM(S): 5 TABLET ORAL at 14:38

## 2023-05-28 RX ADMIN — Medication 4: at 22:56

## 2023-05-28 RX ADMIN — OXYCODONE HYDROCHLORIDE 10 MILLIGRAM(S): 5 TABLET ORAL at 08:55

## 2023-05-28 RX ADMIN — OXYCODONE HYDROCHLORIDE 10 MILLIGRAM(S): 5 TABLET ORAL at 19:30

## 2023-05-28 RX ADMIN — Medication 8: at 09:28

## 2023-05-28 RX ADMIN — Medication 975 MILLIGRAM(S): at 14:18

## 2023-05-28 RX ADMIN — Medication 6: at 18:26

## 2023-05-28 RX ADMIN — HYDROMORPHONE HYDROCHLORIDE 0.5 MILLIGRAM(S): 2 INJECTION INTRAMUSCULAR; INTRAVENOUS; SUBCUTANEOUS at 10:23

## 2023-05-28 RX ADMIN — OXYCODONE HYDROCHLORIDE 5 MILLIGRAM(S): 5 TABLET ORAL at 17:43

## 2023-05-28 RX ADMIN — OXYCODONE HYDROCHLORIDE 10 MILLIGRAM(S): 5 TABLET ORAL at 07:34

## 2023-05-28 RX ADMIN — INSULIN GLARGINE 70 UNIT(S): 100 INJECTION, SOLUTION SUBCUTANEOUS at 22:56

## 2023-05-28 RX ADMIN — OXYCODONE HYDROCHLORIDE 10 MILLIGRAM(S): 5 TABLET ORAL at 22:57

## 2023-05-28 RX ADMIN — Medication 975 MILLIGRAM(S): at 13:39

## 2023-05-28 RX ADMIN — OXYCODONE HYDROCHLORIDE 10 MILLIGRAM(S): 5 TABLET ORAL at 04:03

## 2023-05-28 RX ADMIN — ERTAPENEM SODIUM 120 MILLIGRAM(S): 1 INJECTION, POWDER, LYOPHILIZED, FOR SOLUTION INTRAMUSCULAR; INTRAVENOUS at 16:54

## 2023-05-28 RX ADMIN — Medication 10 UNIT(S): at 18:27

## 2023-05-28 RX ADMIN — Medication 400 MILLIGRAM(S): at 13:38

## 2023-05-28 RX ADMIN — Medication 100 MILLIGRAM(S): at 18:27

## 2023-05-28 RX ADMIN — Medication 6 UNIT(S): at 13:39

## 2023-05-28 RX ADMIN — HYDROMORPHONE HYDROCHLORIDE 0.5 MILLIGRAM(S): 2 INJECTION INTRAMUSCULAR; INTRAVENOUS; SUBCUTANEOUS at 00:00

## 2023-05-28 RX ADMIN — ALBUTEROL 1 PUFF(S): 90 AEROSOL, METERED ORAL at 09:46

## 2023-05-28 RX ADMIN — Medication 200 MILLIGRAM(S): at 00:30

## 2023-05-28 RX ADMIN — INSULIN GLARGINE 65 UNIT(S): 100 INJECTION, SOLUTION SUBCUTANEOUS at 09:30

## 2023-05-28 RX ADMIN — APIXABAN 5 MILLIGRAM(S): 2.5 TABLET, FILM COATED ORAL at 18:27

## 2023-05-28 RX ADMIN — Medication 6 UNIT(S): at 09:29

## 2023-05-28 RX ADMIN — HYDROMORPHONE HYDROCHLORIDE 0.5 MILLIGRAM(S): 2 INJECTION INTRAMUSCULAR; INTRAVENOUS; SUBCUTANEOUS at 00:30

## 2023-05-28 RX ADMIN — Medication 200 MILLIGRAM(S): at 07:04

## 2023-05-28 RX ADMIN — OXYCODONE HYDROCHLORIDE 10 MILLIGRAM(S): 5 TABLET ORAL at 03:37

## 2023-05-28 RX ADMIN — OXYCODONE HYDROCHLORIDE 10 MILLIGRAM(S): 5 TABLET ORAL at 18:36

## 2023-05-28 RX ADMIN — HYDROMORPHONE HYDROCHLORIDE 0.5 MILLIGRAM(S): 2 INJECTION INTRAMUSCULAR; INTRAVENOUS; SUBCUTANEOUS at 09:30

## 2023-05-28 RX ADMIN — Medication 2: at 13:38

## 2023-05-28 RX ADMIN — Medication 200 MILLIGRAM(S): at 00:00

## 2023-05-28 RX ADMIN — OXYCODONE HYDROCHLORIDE 5 MILLIGRAM(S): 5 TABLET ORAL at 16:54

## 2023-05-28 RX ADMIN — Medication 100 MILLIGRAM(S): at 06:36

## 2023-05-28 RX ADMIN — OXYCODONE HYDROCHLORIDE 10 MILLIGRAM(S): 5 TABLET ORAL at 15:51

## 2023-05-28 RX ADMIN — Medication 400 MILLIGRAM(S): at 14:18

## 2023-05-28 RX ADMIN — Medication 200 MILLIGRAM(S): at 06:36

## 2023-05-28 NOTE — PROCEDURE NOTE - NSICDXPROCEDURE_GEN_ALL_CORE_FT
PROCEDURES:  Ultrasound guided venous access 24-May-2023 12:27:13  Maico Williamson  Ultrasound guided venous access 24-May-2023 12:28:41  Maico Williamson  
PROCEDURES:  Ultrasound guided venous access 24-May-2023 12:27:13  Maico Williamson  
PROCEDURES:  Ultrasound guided venous access 24-May-2023 12:27:13  Maico Williamson  Ultrasound guided venous access 24-May-2023 12:28:41  Maico Williamson

## 2023-05-28 NOTE — PROGRESS NOTE ADULT - PROBLEM SELECTOR PLAN 2
Admitted for severe sepsis 2/2 UTI. UA positive showing small LE, Positive nitrites, >10 WBC. Prior urine cx growing carbapenem-resistant and ESBL Klebsiella. s/p 2L NS, Gentamicin in ED (did not get Vancomycin). Tx with Gentamicin x5d on previous admission. Hx of suprapubic catheter placed in 11/2021 at [facility] as complication of abdominal necrotizing fasciitis, last exchanged at [facility] on 3/24. Current catheter site with some surrounding skin changes and old catheter site now open with visible catheter underneath.     - Plan as above cx with e coli and mdro klebsiella  c/w gent, erta per ID  14 day course of each   PICC on tuesday

## 2023-05-28 NOTE — PROGRESS NOTE ADULT - PROBLEM SELECTOR PLAN 10
F: s/p 2L NS in ED  E: Replete PRN  DVT ppx: Eliqius   GI ppx: None  Bowel: None  Dispo: [facility]    PCP: Dr. Pelon Sánchez ([place] @ [facility])     FULL CODE

## 2023-05-28 NOTE — PROCEDURE NOTE - NSCHLORHEXIDINEBATH_GEN_A_CORE
To be discontinued when line removed

## 2023-05-28 NOTE — PROGRESS NOTE ADULT - PROBLEM SELECTOR PLAN 7
CTPE on 4/4 showing bilateral PEs. Home regimen includes Eliquis 5mg Q12.    - Holding Eliquis pending possible suprapubic catheter exchange procedure  - Hep on hold for procedure c/w adriana

## 2023-05-28 NOTE — CONSULT NOTE ADULT - SUBJECTIVE AND OBJECTIVE BOX
HPI: Ms. Katz is a 43 year-old woman with a history of pulmonary embolism, type 2 diabetes mellitus, abdominal necrotizing fasciitis status post suprapubic Cather and ostomy placement in 2021 admitted with sepsis due to urinary tract infection and catheter-site reaction. We were consulted for assistance in glycemic management.    Patient known from previous hospital admission. Hemoglobin 12.2% at that time. She was discharged on May 3rd on Humulin U-500 100 units three times daily. She was initially managed on Lantus 60 units twice daily, Lispro 6 units with meals, and Lispro moderate dose sliding scale. Yesterday we recommended adjustment in Lantus to 65 units twice daily.    PMH & Surgical Hx:SEPSIS    No pertinent family history in first degree relatives    FH: diabetes mellitus    FH: hypertension    Handoff    MEWS Score    Essential hypertension    Hyperlipidemia    Diabetes    Obesity    Hernia    Abdominal hernia    Pre-eclampsia    Pulmonary embolism    Sepsis    Severe sepsis    DM (diabetes mellitus)    HTN (hypertension)    Pulmonary embolism    Nutrition, metabolism, and development symptoms    Chronic suprapubic catheter    UTI (urinary tract infection)    Hypokalemia    Right ovarian cyst    History of creation of ostomy    Pulmonary thromboembolism    Asthma    Ultrasound guided venous access    Ultrasound guided venous access    H/O LEEP    History of D&C    H/O:     H/O ventral hernia repair    H/O exploratory laparotomy    ABDO PAIN    19    Room Service Assist    SysAdmin_VstLnk        FH: Mother and father with history of diabetes.    SH: No tobacco use.    Current Meds:  acetaminophen     Tablet .. 975 milliGRAM(s) Oral every 8 hours  albuterol    90 MICROgram(s) HFA Inhaler 1 Puff(s) Inhalation daily  apixaban 5 milliGRAM(s) Oral every 12 hours  dextrose 5%. 1000 milliLiter(s) IV Continuous <Continuous>  dextrose 5%. 1000 milliLiter(s) IV Continuous <Continuous>  dextrose 50% Injectable 25 Gram(s) IV Push once  dextrose 50% Injectable 12.5 Gram(s) IV Push once  dextrose 50% Injectable 25 Gram(s) IV Push once  dextrose Oral Gel 15 Gram(s) Oral once PRN  ertapenem  IVPB 1000 milliGRAM(s) IV Intermittent every 24 hours  gentamicin   IVPB 400 milliGRAM(s) IV Intermittent every 24 hours  glucagon  Injectable 1 milliGRAM(s) IntraMuscular once  ibuprofen  Tablet. 400 milliGRAM(s) Oral every 6 hours  insulin glargine Injectable (LANTUS) 65 Unit(s) SubCutaneous two times a day  insulin lispro (ADMELOG) corrective regimen sliding scale   SubCutaneous Before meals and at bedtime  insulin lispro Injectable (ADMELOG) 6 Unit(s) SubCutaneous three times a day before meals  labetalol 100 milliGRAM(s) Oral two times a day  oxyCODONE    IR 10 milliGRAM(s) Oral every 4 hours PRN  oxyCODONE    IR 5 milliGRAM(s) Oral every 6 hours PRN      Allergies:  shellfish. (Hives; Anaphylaxis)  vancomycin (Anaphylaxis; Hives)  penicillin (Hives)  clindamycin (Pruritus)  amoxicillin (Unknown)  Bactrim I.V. (Rash (Mod to Severe))  iodine containing compounds (Hives; Anaphylaxis)  fish (Hives; Urticaria)      ROS:  Denies the following except as indicated.    General: weight loss/weight gain, decreased appetite, fatigue  Eyes: Blurry vision, double vision, visual changes  ENT: Throat pain, changes in voice,   CV: palpitations, SOB, CP, cough  GI: NVD, difficulty swallowing, abdominal pain  : polyuria, dysuria  Endo: abnormal menses, temperature intolerance, decreased libido  MSK: weakness, joint pain  Skin: rash, dryness, diaphoresis  Heme: Easy bruising,bleeding  Neuro: HA, dizziness, lightheadedness, numbness tingling  Psych: Anxiety, Depression    Vital Signs Last 24 Hrs  T(C): 36.9 (28 May 2023 12:15), Max: 37.1 (27 May 2023 20:38)  T(F): 98.5 (28 May 2023 12:15), Max: 98.8 (27 May 2023 20:38)  HR: 80 (28 May 2023 12:15) (80 - 97)  BP: 135/93 (28 May 2023 12:15) (118/72 - 135/93)  BP(mean): --  RR: 18 (28 May 2023 12:15) (17 - 18)  SpO2: 96% (28 May 2023 12:15) (96% - 99%)    Parameters below as of 28 May 2023 12:15  Patient On (Oxygen Delivery Method): room air      Height (cm): 170.2 ( @ 21:53)  Weight (kg): 105.2 ( @ 23:42)  BMI (kg/m2): 36.3 ( @ 23:42)      Constitutional: wn/wd in NAD.   HEENT: NCAT, MMM, OP clear, EOMI, , no proptosis or lid retraction  Neck: no thyromegaly or palpable thyroid nodules   Respiratory: lungs CTAB.  Cardiovascular: regular rhythm, normal S1 and S2, no audible murmurs, no peripheral edema  GI: soft, NT/ND, no masses/HSM appreciated.  Neurology: no tremors, DTR 2+  Skin: no visible rashes/lesions  Psychiatric: AAO x 3, normal affect/mood.  Ext: radial pulses intact, DP pulses intact, extremities warm, no cyanosis, clubbing or edema.       LABS:                        12.2   8.36  )-----------( 369      ( 28 May 2023 05:30 )             40.7         134<L>  |  98  |  16  ----------------------------<  251<H>  4.5   |  24  |  0.75    Ca    8.9      28 May 2023 05:30  Phos  3.1       Mg     1.7         TPro  7.1  /  Alb  3.3  /  TBili  0.2  /  DBili  x   /  AST  17  /  ALT  11  /  AlkPhos  126<H>            Thyroid Stimulating Hormone, Serum: 0.851 ( @ 05:24)      RADIOLOGY & ADDITIONAL STUDIES:  CAPILLARY BLOOD GLUCOSE      POCT Blood Glucose.: 200 mg/dL (28 May 2023 13:03)  POCT Blood Glucose.: 322 mg/dL (28 May 2023 09:06)  POCT Blood Glucose.: 307 mg/dL (27 May 2023 22:14)  POCT Blood Glucose.: 351 mg/dL (27 May 2023 18:04)        Impression/Recommendations: Ms. Katz is a 43 year-old woman with a history of pulmonary embolism, type 2 diabetes mellitus, abdominal necrotizing fasciitis status post suprapubic Cather and ostomy placement in 2021 admitted with sepsis due to urinary tract infection and catheter-site reaction. We were consulted for assistance in glycemic management.    1. Diabetes, uncontrolled, uncomplicated.  Please continue a consistent carbohydrate diet  Please adjust Lantus to 70 units twice daily  Please adjust Lispro to 10 units with meals and bedtime  Please continue Lispro moderate dose sliding scale with meals and at bedtime    Patient’s finger stick goal is 100-180 mg/dL.  We will continue to follow.

## 2023-05-28 NOTE — PROGRESS NOTE ADULT - PROBLEM SELECTOR PLAN 6
Hx of HTN. Home medications include Labetalol 100 mg BID and Losartan 50mg Q24.     - c/w labetalol 100mg BID Hx of HTN. Home medications include Labetalol 100 mg BID and Losartan 50mg Q24.     - c/w labetalol 100mg BID  restart losartan if further BP control needed

## 2023-05-28 NOTE — PROGRESS NOTE ADULT - PROBLEM SELECTOR PLAN 1
Px with worsening lower abd pain, drainage from suprapubic catheter site. Meeting 2/4 SIRS (HR, WBC) with elevated lactate 5.3 (cleared after fluids to 1.7). CTAP largely unremarkable. On exam, current cath site with surrounding skin changes and old site open with visible underlying catheter. Likely source UTI given positive UA, hx of UTI, and prior urine cx growing carbapenem-resistant and ESBL Klebsiella. s/p 2L NS, Gentamicin in ED (did not get Vancomycin).     - s/p IR replacement of suprapubic tube iso complicated UTI/sepsis (5/25)  - c/w Gentamicin 400mg (weight based) w AM level (goal <2)  - Suprapubic Cath cultures (5/22): +Klebsiella, + Ecoli, pending susceptibilities   - Blood cultures (5/23) NGTD   - Pain management: Tylenol 975mg q8hrs, Ibuprofen 200mg q6hrs, PRN oxy (moderate 5mg q6, severe 10mg q4) w breakthrough pain (0.5 Dilaudid q 8hrs)  - Urology following, f/u recs   - ID following, f/u recs 2/2 UTI and possible cellulitis surrounding suprapubic catheter, now exchanged by IR  c/w ertsurinder and gent as below

## 2023-05-28 NOTE — PROGRESS NOTE ADULT - PROBLEM SELECTOR PLAN 3
Hx of DM. Prior hospital course c/b labile blood glucose with endocrine consulted. Last A1C 12 prior admission. Home regimen per prior discharge includes Humulin 100U TID.     - mISS  - Start lantus 60 BID + lispro 2u TID.   - Diet, Consistent Carb Hx of DM. Prior hospital course c/b labile blood glucose with endocrine consulted. Last A1C 12 prior admission. Home regimen per prior discharge includes Humulin 100U TID.     insulin per endocrine as sugars uncontrolled

## 2023-05-28 NOTE — PROGRESS NOTE ADULT - PROBLEM SELECTOR PLAN 9
RESOLVED   Initial labs remarkable for K 2.6, repleted with potassium Cl 10mEq IVPB x3 with repeat potassium 3.1. EKG showing sinus tachycardia without prolonged QTC or any other changes. Pt reports decreased PO intake 2/2 pain but denies any other insensible losses, no increased ostomy output.     - Replete K as necessary w daily BMPs F: s/p 2L NS in ED  E: Replete PRN  DVT ppx: Eliqius   GI ppx: None  Bowel: None  Dispo: [facility]    PCP: Dr. Pelon Sánchez ([place] @ [facility])     FULL CODE

## 2023-05-28 NOTE — PROGRESS NOTE ADULT - SUBJECTIVE AND OBJECTIVE BOX
OVERNIGHT EVENTS:    SUBJECTIVE / INTERVAL HPI: Patient seen and examined at bedside.     VITAL SIGNS:  Vital Signs Last 24 Hrs  T(C): 36.6 (28 May 2023 05:53), Max: 37.2 (27 May 2023 14:00)  T(F): 97.9 (28 May 2023 05:53), Max: 98.9 (27 May 2023 14:00)  HR: 90 (28 May 2023 05:53) (90 - 97)  BP: 118/72 (28 May 2023 05:53) (118/72 - 136/78)  BP(mean): --  RR: 17 (28 May 2023 05:53) (17 - 18)  SpO2: 99% (28 May 2023 05:53) (97% - 99%)    Parameters below as of 27 May 2023 20:38  Patient On (Oxygen Delivery Method): room air          PHYSICAL EXAM:    General: WDWN  HEENT: NC/AT; PERRL, anicteric sclera; MMM  Neck: supple  Cardiovascular: +S1/S2; RRR  Respiratory: CTA B/L; no W/R/R  Gastrointestinal: soft, NT/ND; +BSx4  Extremities: WWP; no edema, clubbing or cyanosis  Vascular: 2+ radial, DP/PT pulses B/L  Neurological: AAOx3; no focal deficits    MEDICATIONS:  MEDICATIONS  (STANDING):  acetaminophen     Tablet .. 975 milliGRAM(s) Oral every 8 hours  albuterol    90 MICROgram(s) HFA Inhaler 1 Puff(s) Inhalation daily  apixaban 5 milliGRAM(s) Oral every 12 hours  dextrose 5%. 1000 milliLiter(s) (50 mL/Hr) IV Continuous <Continuous>  dextrose 5%. 1000 milliLiter(s) (100 mL/Hr) IV Continuous <Continuous>  dextrose 50% Injectable 25 Gram(s) IV Push once  dextrose 50% Injectable 12.5 Gram(s) IV Push once  dextrose 50% Injectable 25 Gram(s) IV Push once  ertapenem  IVPB 1000 milliGRAM(s) IV Intermittent every 24 hours  gentamicin   IVPB 400 milliGRAM(s) IV Intermittent every 24 hours  glucagon  Injectable 1 milliGRAM(s) IntraMuscular once  ibuprofen  Tablet. 200 milliGRAM(s) Oral every 6 hours  insulin glargine Injectable (LANTUS) 65 Unit(s) SubCutaneous two times a day  insulin lispro (ADMELOG) corrective regimen sliding scale   SubCutaneous Before meals and at bedtime  insulin lispro Injectable (ADMELOG) 6 Unit(s) SubCutaneous three times a day before meals  labetalol 100 milliGRAM(s) Oral two times a day    MEDICATIONS  (PRN):  dextrose Oral Gel 15 Gram(s) Oral once PRN Blood Glucose LESS THAN 70 milliGRAM(s)/deciliter  HYDROmorphone  Injectable 0.5 milliGRAM(s) IV Push every 8 hours PRN for breakthrough pain  oxyCODONE    IR 5 milliGRAM(s) Oral every 6 hours PRN Moderate Pain (4 - 6)  oxyCODONE    IR 10 milliGRAM(s) Oral every 4 hours PRN Severe Pain (7 - 10)      ALLERGIES:  Allergies    shellfish. (Hives; Anaphylaxis)  vancomycin (Anaphylaxis; Hives)  penicillin (Hives)  clindamycin (Pruritus)  amoxicillin (Unknown)  iodine containing compounds (Hives; Anaphylaxis)  fish (Hives; Urticaria)    Intolerances    Bactrim I.V. (Rash (Mod to Severe))      LABS:                        12.2   8.36  )-----------( 369      ( 28 May 2023 05:30 )             40.7     05-28    134<L>  |  98  |  16  ----------------------------<  251<H>  4.5   |  24  |  0.75    Ca    8.9      28 May 2023 05:30  Phos  3.1     05-28  Mg     1.7     05-28    TPro  7.1  /  Alb  3.3  /  TBili  0.2  /  DBili  x   /  AST  17  /  ALT  11  /  AlkPhos  126<H>  05-28        CAPILLARY BLOOD GLUCOSE      POCT Blood Glucose.: 307 mg/dL (27 May 2023 22:14)        RADIOLOGY & ADDITIONAL TESTS: Reviewed.    ASSESSMENT:    PLAN:  OVERNIGHT EVENTS: None    SUBJECTIVE / INTERVAL HPI: Patient seen and examined at bedside. Continues to complain of pain    VITAL SIGNS:  Vital Signs Last 24 Hrs  T(C): 36.6 (28 May 2023 05:53), Max: 37.2 (27 May 2023 14:00)  T(F): 97.9 (28 May 2023 05:53), Max: 98.9 (27 May 2023 14:00)  HR: 90 (28 May 2023 05:53) (90 - 97)  BP: 118/72 (28 May 2023 05:53) (118/72 - 136/78)  BP(mean): --  RR: 17 (28 May 2023 05:53) (17 - 18)  SpO2: 99% (28 May 2023 05:53) (97% - 99%)    Parameters below as of 27 May 2023 20:38  Patient On (Oxygen Delivery Method): room air          PHYSICAL EXAM:  General: Overweight  female, resting comfortable  Cardiac: +S1/S2; Tachycardic. No murmurs, rubs, gallops.   Pulm: CTAB  Gastrointestinal: soft, NT/ND; no rebound or guarding; +BSx4. Ostomy site appears clean/dry.   : suprapubic catheter in place, bandage c/d/i   Extremities: WWP, no clubbing or cyanosis; no peripheral edema  Musculoskeletal: NROM x4; no joint swelling, tenderness or erythema.  Vascular: 2+ radial, DP/PT pulses B/L  Neurologic: AAOx3    MEDICATIONS:  MEDICATIONS  (STANDING):  acetaminophen     Tablet .. 975 milliGRAM(s) Oral every 8 hours  albuterol    90 MICROgram(s) HFA Inhaler 1 Puff(s) Inhalation daily  apixaban 5 milliGRAM(s) Oral every 12 hours  dextrose 5%. 1000 milliLiter(s) (50 mL/Hr) IV Continuous <Continuous>  dextrose 5%. 1000 milliLiter(s) (100 mL/Hr) IV Continuous <Continuous>  dextrose 50% Injectable 25 Gram(s) IV Push once  dextrose 50% Injectable 12.5 Gram(s) IV Push once  dextrose 50% Injectable 25 Gram(s) IV Push once  ertapenem  IVPB 1000 milliGRAM(s) IV Intermittent every 24 hours  gentamicin   IVPB 400 milliGRAM(s) IV Intermittent every 24 hours  glucagon  Injectable 1 milliGRAM(s) IntraMuscular once  ibuprofen  Tablet. 200 milliGRAM(s) Oral every 6 hours  insulin glargine Injectable (LANTUS) 65 Unit(s) SubCutaneous two times a day  insulin lispro (ADMELOG) corrective regimen sliding scale   SubCutaneous Before meals and at bedtime  insulin lispro Injectable (ADMELOG) 6 Unit(s) SubCutaneous three times a day before meals  labetalol 100 milliGRAM(s) Oral two times a day    MEDICATIONS  (PRN):  dextrose Oral Gel 15 Gram(s) Oral once PRN Blood Glucose LESS THAN 70 milliGRAM(s)/deciliter  HYDROmorphone  Injectable 0.5 milliGRAM(s) IV Push every 8 hours PRN for breakthrough pain  oxyCODONE    IR 5 milliGRAM(s) Oral every 6 hours PRN Moderate Pain (4 - 6)  oxyCODONE    IR 10 milliGRAM(s) Oral every 4 hours PRN Severe Pain (7 - 10)      ALLERGIES:  Allergies    shellfish. (Hives; Anaphylaxis)  vancomycin (Anaphylaxis; Hives)  penicillin (Hives)  clindamycin (Pruritus)  amoxicillin (Unknown)  iodine containing compounds (Hives; Anaphylaxis)  fish (Hives; Urticaria)    Intolerances    Bactrim I.V. (Rash (Mod to Severe))      LABS:                        12.2   8.36  )-----------( 369      ( 28 May 2023 05:30 )             40.7     05-28    134<L>  |  98  |  16  ----------------------------<  251<H>  4.5   |  24  |  0.75    Ca    8.9      28 May 2023 05:30  Phos  3.1     05-28  Mg     1.7     05-28    TPro  7.1  /  Alb  3.3  /  TBili  0.2  /  DBili  x   /  AST  17  /  ALT  11  /  AlkPhos  126<H>  05-28        CAPILLARY BLOOD GLUCOSE      POCT Blood Glucose.: 307 mg/dL (27 May 2023 22:14)        RADIOLOGY & ADDITIONAL TESTS: Reviewed.    ASSESSMENT:    PLAN:

## 2023-05-28 NOTE — PROGRESS NOTE ADULT - ASSESSMENT
42 yo F with PMHx asthma, hx PE (Eliquis), DM, HTN, HLD, hx abdominal necrotizing fasciitis (s/p suprapubic catheter, ostomy placement in 11/2021) px to [facility] ED from home with worsening abd/suprapubic pain and drainage from prior suprapubic catheter insertion site found to have sepsis sepsis 2/2 UTI and catheter-site infection.

## 2023-05-29 LAB
ANION GAP SERPL CALC-SCNC: 12 MMOL/L — SIGNIFICANT CHANGE UP (ref 5–17)
BASOPHILS # BLD AUTO: 0.02 K/UL — SIGNIFICANT CHANGE UP (ref 0–0.2)
BASOPHILS NFR BLD AUTO: 0.2 % — SIGNIFICANT CHANGE UP (ref 0–2)
BUN SERPL-MCNC: 17 MG/DL — SIGNIFICANT CHANGE UP (ref 7–23)
CALCIUM SERPL-MCNC: 8.4 MG/DL — SIGNIFICANT CHANGE UP (ref 8.4–10.5)
CHLORIDE SERPL-SCNC: 101 MMOL/L — SIGNIFICANT CHANGE UP (ref 96–108)
CO2 SERPL-SCNC: 23 MMOL/L — SIGNIFICANT CHANGE UP (ref 22–31)
CREAT SERPL-MCNC: 0.73 MG/DL — SIGNIFICANT CHANGE UP (ref 0.5–1.3)
EGFR: 105 ML/MIN/1.73M2 — SIGNIFICANT CHANGE UP
EOSINOPHIL # BLD AUTO: 0.24 K/UL — SIGNIFICANT CHANGE UP (ref 0–0.5)
EOSINOPHIL NFR BLD AUTO: 2.8 % — SIGNIFICANT CHANGE UP (ref 0–6)
GLUCOSE BLDC GLUCOMTR-MCNC: 187 MG/DL — HIGH (ref 70–99)
GLUCOSE BLDC GLUCOMTR-MCNC: 189 MG/DL — HIGH (ref 70–99)
GLUCOSE BLDC GLUCOMTR-MCNC: 211 MG/DL — HIGH (ref 70–99)
GLUCOSE BLDC GLUCOMTR-MCNC: 225 MG/DL — HIGH (ref 70–99)
GLUCOSE BLDC GLUCOMTR-MCNC: 266 MG/DL — HIGH (ref 70–99)
GLUCOSE SERPL-MCNC: 205 MG/DL — HIGH (ref 70–99)
HCT VFR BLD CALC: 36.7 % — SIGNIFICANT CHANGE UP (ref 34.5–45)
HGB BLD-MCNC: 11.2 G/DL — LOW (ref 11.5–15.5)
IMM GRANULOCYTES NFR BLD AUTO: 0.6 % — SIGNIFICANT CHANGE UP (ref 0–0.9)
LYMPHOCYTES # BLD AUTO: 2.4 K/UL — SIGNIFICANT CHANGE UP (ref 1–3.3)
LYMPHOCYTES # BLD AUTO: 28.4 % — SIGNIFICANT CHANGE UP (ref 13–44)
MAGNESIUM SERPL-MCNC: 1.8 MG/DL — SIGNIFICANT CHANGE UP (ref 1.6–2.6)
MCHC RBC-ENTMCNC: 24.2 PG — LOW (ref 27–34)
MCHC RBC-ENTMCNC: 30.5 GM/DL — LOW (ref 32–36)
MCV RBC AUTO: 79.4 FL — LOW (ref 80–100)
MONOCYTES # BLD AUTO: 0.68 K/UL — SIGNIFICANT CHANGE UP (ref 0–0.9)
MONOCYTES NFR BLD AUTO: 8 % — SIGNIFICANT CHANGE UP (ref 2–14)
NEUTROPHILS # BLD AUTO: 5.06 K/UL — SIGNIFICANT CHANGE UP (ref 1.8–7.4)
NEUTROPHILS NFR BLD AUTO: 60 % — SIGNIFICANT CHANGE UP (ref 43–77)
NRBC # BLD: 0 /100 WBCS — SIGNIFICANT CHANGE UP (ref 0–0)
PLATELET # BLD AUTO: 361 K/UL — SIGNIFICANT CHANGE UP (ref 150–400)
POTASSIUM SERPL-MCNC: 4.5 MMOL/L — SIGNIFICANT CHANGE UP (ref 3.5–5.3)
POTASSIUM SERPL-SCNC: 4.5 MMOL/L — SIGNIFICANT CHANGE UP (ref 3.5–5.3)
RBC # BLD: 4.62 M/UL — SIGNIFICANT CHANGE UP (ref 3.8–5.2)
RBC # FLD: 16.3 % — HIGH (ref 10.3–14.5)
SODIUM SERPL-SCNC: 136 MMOL/L — SIGNIFICANT CHANGE UP (ref 135–145)
WBC # BLD: 8.45 K/UL — SIGNIFICANT CHANGE UP (ref 3.8–10.5)
WBC # FLD AUTO: 8.45 K/UL — SIGNIFICANT CHANGE UP (ref 3.8–10.5)

## 2023-05-29 PROCEDURE — 99233 SBSQ HOSP IP/OBS HIGH 50: CPT

## 2023-05-29 RX ORDER — INSULIN LISPRO 100/ML
10 VIAL (ML) SUBCUTANEOUS AT BEDTIME
Refills: 0 | Status: DISCONTINUED | OUTPATIENT
Start: 2023-05-29 | End: 2023-05-30

## 2023-05-29 RX ORDER — INSULIN GLARGINE 100 [IU]/ML
70 INJECTION, SOLUTION SUBCUTANEOUS
Refills: 0 | Status: DISCONTINUED | OUTPATIENT
Start: 2023-05-29 | End: 2023-05-31

## 2023-05-29 RX ORDER — HYDROMORPHONE HYDROCHLORIDE 2 MG/ML
0.5 INJECTION INTRAMUSCULAR; INTRAVENOUS; SUBCUTANEOUS
Refills: 0 | Status: DISCONTINUED | OUTPATIENT
Start: 2023-05-29 | End: 2023-05-30

## 2023-05-29 RX ADMIN — ALBUTEROL 1 PUFF(S): 90 AEROSOL, METERED ORAL at 09:41

## 2023-05-29 RX ADMIN — HYDROMORPHONE HYDROCHLORIDE 0.5 MILLIGRAM(S): 2 INJECTION INTRAMUSCULAR; INTRAVENOUS; SUBCUTANEOUS at 21:14

## 2023-05-29 RX ADMIN — Medication 6: at 22:22

## 2023-05-29 RX ADMIN — Medication 4: at 17:51

## 2023-05-29 RX ADMIN — HYDROMORPHONE HYDROCHLORIDE 0.5 MILLIGRAM(S): 2 INJECTION INTRAMUSCULAR; INTRAVENOUS; SUBCUTANEOUS at 12:20

## 2023-05-29 RX ADMIN — Medication 400 MILLIGRAM(S): at 11:49

## 2023-05-29 RX ADMIN — Medication 10 UNIT(S): at 22:23

## 2023-05-29 RX ADMIN — ERTAPENEM SODIUM 120 MILLIGRAM(S): 1 INJECTION, POWDER, LYOPHILIZED, FOR SOLUTION INTRAMUSCULAR; INTRAVENOUS at 17:51

## 2023-05-29 RX ADMIN — Medication 400 MILLIGRAM(S): at 12:20

## 2023-05-29 RX ADMIN — Medication 4: at 13:17

## 2023-05-29 RX ADMIN — Medication 400 MILLIGRAM(S): at 00:21

## 2023-05-29 RX ADMIN — Medication 400 MILLIGRAM(S): at 07:27

## 2023-05-29 RX ADMIN — OXYCODONE HYDROCHLORIDE 10 MILLIGRAM(S): 5 TABLET ORAL at 18:29

## 2023-05-29 RX ADMIN — Medication 100 MILLIGRAM(S): at 17:44

## 2023-05-29 RX ADMIN — Medication 400 MILLIGRAM(S): at 18:29

## 2023-05-29 RX ADMIN — OXYCODONE HYDROCHLORIDE 5 MILLIGRAM(S): 5 TABLET ORAL at 00:21

## 2023-05-29 RX ADMIN — INSULIN GLARGINE 70 UNIT(S): 100 INJECTION, SOLUTION SUBCUTANEOUS at 11:47

## 2023-05-29 RX ADMIN — OXYCODONE HYDROCHLORIDE 5 MILLIGRAM(S): 5 TABLET ORAL at 10:14

## 2023-05-29 RX ADMIN — APIXABAN 5 MILLIGRAM(S): 2.5 TABLET, FILM COATED ORAL at 05:44

## 2023-05-29 RX ADMIN — Medication 400 MILLIGRAM(S): at 06:53

## 2023-05-29 RX ADMIN — Medication 400 MILLIGRAM(S): at 17:44

## 2023-05-29 RX ADMIN — Medication 10 UNIT(S): at 17:51

## 2023-05-29 RX ADMIN — Medication 975 MILLIGRAM(S): at 06:13

## 2023-05-29 RX ADMIN — Medication 2: at 09:21

## 2023-05-29 RX ADMIN — Medication 10 UNIT(S): at 13:18

## 2023-05-29 RX ADMIN — OXYCODONE HYDROCHLORIDE 5 MILLIGRAM(S): 5 TABLET ORAL at 01:21

## 2023-05-29 RX ADMIN — Medication 100 MILLIGRAM(S): at 06:53

## 2023-05-29 RX ADMIN — Medication 200 MILLIGRAM(S): at 18:48

## 2023-05-29 RX ADMIN — HYDROMORPHONE HYDROCHLORIDE 0.5 MILLIGRAM(S): 2 INJECTION INTRAMUSCULAR; INTRAVENOUS; SUBCUTANEOUS at 20:44

## 2023-05-29 RX ADMIN — INSULIN GLARGINE 70 UNIT(S): 100 INJECTION, SOLUTION SUBCUTANEOUS at 22:21

## 2023-05-29 RX ADMIN — OXYCODONE HYDROCHLORIDE 10 MILLIGRAM(S): 5 TABLET ORAL at 17:44

## 2023-05-29 RX ADMIN — OXYCODONE HYDROCHLORIDE 5 MILLIGRAM(S): 5 TABLET ORAL at 09:39

## 2023-05-29 RX ADMIN — OXYCODONE HYDROCHLORIDE 10 MILLIGRAM(S): 5 TABLET ORAL at 06:52

## 2023-05-29 RX ADMIN — APIXABAN 5 MILLIGRAM(S): 2.5 TABLET, FILM COATED ORAL at 17:45

## 2023-05-29 RX ADMIN — HYDROMORPHONE HYDROCHLORIDE 0.5 MILLIGRAM(S): 2 INJECTION INTRAMUSCULAR; INTRAVENOUS; SUBCUTANEOUS at 11:47

## 2023-05-29 RX ADMIN — Medication 400 MILLIGRAM(S): at 01:22

## 2023-05-29 RX ADMIN — Medication 10 UNIT(S): at 09:21

## 2023-05-29 NOTE — PROGRESS NOTE ADULT - PROBLEM SELECTOR PLAN 9
F: s/p 2L NS in ED  E: Replete PRN  DVT ppx: Eliqius   GI ppx: None  Bowel: None  Dispo: [facility]    PCP: Dr. Pelon Sánchez ([place] @ [facility])     FULL CODE

## 2023-05-29 NOTE — PROGRESS NOTE ADULT - PROBLEM SELECTOR PLAN 2
Admitted for severe sepsis 2/2 UTI. UA positive showing small LE, Positive nitrites, >10 WBC. Prior urine cx growing carbapenem-resistant and ESBL Klebsiella. s/p 2L NS, Gentamicin in ED. ID following    Plan:  - c/w Ertapenem 1g IV daily - ends 6/09/23  - c/w Gentamicin 400mg IV daily - end 06/05/23  - PICC on tuesday

## 2023-05-29 NOTE — PROGRESS NOTE ADULT - PROBLEM SELECTOR PLAN 1
Px with worsening lower abd pain, drainage from suprapubic catheter site. Meeting 2/4 SIRS (HR, WBC) with elevated lactate 5.3 (cleared after fluids to 1.7). CTAP largely unremarkable. On exam, current cath site with surrounding skin changes and old site open with visible underlying catheter. Likely source UTI given positive UA, hx of UTI, and prior urine cx growing carbapenem-resistant and ESBL Klebsiella.  - s/p IR replacement of suprapubic tube iso complicated UTI/sepsis (5/25)    Plan:  - c/w Gentamicin 400mg (weight based) w AM level (goal <2)  - Suprapubic Cath cultures (5/22): +Klebsiella, + Ecoli, pending susceptibilities   - Blood cultures (5/23) NGTD   - Pain management: Tylenol 975mg q8hrs, Ibuprofen 200mg q6hrs, PRN oxy (moderate 5mg q6, severe 10mg q4)  - Urology following, f/u recs   - ID following, f/u recs.

## 2023-05-29 NOTE — PROGRESS NOTE ADULT - SUBJECTIVE AND OBJECTIVE BOX
HASMUKH AGUILERA, 43y, Female  [id number]  Patient is a 43y old  Female who presents with a chief complaint of Sepsis, UTI (29 May 2023 06:49)      OVERNIGHT EVENTS:     SUBJECTIVE:    12 Point ROS Negative unless noted otherwise above.  -------------------------------------------------------------------------------  VITAL SIGNS:  Vital Signs Last 24 Hrs  T(C): 36.9 (29 May 2023 05:54), Max: 36.9 (28 May 2023 12:15)  T(F): 98.5 (29 May 2023 05:54), Max: 98.5 (28 May 2023 12:15)  HR: 83 (29 May 2023 05:54) (80 - 89)  BP: 107/64 (29 May 2023 05:54) (107/64 - 135/93)  BP(mean): --  RR: 18 (29 May 2023 05:54) (18 - 18)  SpO2: 95% (29 May 2023 05:54) (95% - 98%)    Parameters below as of 28 May 2023 20:29  Patient On (Oxygen Delivery Method): room air      I&O's Summary      PHYSICAL EXAM:    General: NAD, well developed  HEENT: NC/AT; EOMI, PERRLA, anicteric sclera; moist mucosal membranes.  Neck: supple, trachea midline  Cardiovascular: RRR, +S1/S2; NO M/R/G  Respiratory: CTA B/L; no W/R/R  Gastrointestinal: soft, NT/ND; +BSx4  Extremities: WWP; no edema or cyanosis  Vascular: 2+ radial, DP/PT pulses B/L  Neurological: AAOx3; no focal deficits    ALLERGIES:  Allergies    shellfish. (Hives; Anaphylaxis)  vancomycin (Anaphylaxis; Hives)  penicillin (Hives)  clindamycin (Pruritus)  amoxicillin (Unknown)  iodine containing compounds (Hives; Anaphylaxis)  fish (Hives; Urticaria)    Intolerances    Bactrim I.V. (Rash (Mod to Severe))      MEDICATIONS:  MEDICATIONS  (STANDING):  acetaminophen     Tablet .. 975 milliGRAM(s) Oral every 8 hours  albuterol    90 MICROgram(s) HFA Inhaler 1 Puff(s) Inhalation daily  apixaban 5 milliGRAM(s) Oral every 12 hours  dextrose 5%. 1000 milliLiter(s) (100 mL/Hr) IV Continuous <Continuous>  dextrose 5%. 1000 milliLiter(s) (50 mL/Hr) IV Continuous <Continuous>  dextrose 50% Injectable 25 Gram(s) IV Push once  dextrose 50% Injectable 12.5 Gram(s) IV Push once  dextrose 50% Injectable 25 Gram(s) IV Push once  ertapenem  IVPB 1000 milliGRAM(s) IV Intermittent every 24 hours  gentamicin   IVPB 400 milliGRAM(s) IV Intermittent every 24 hours  glucagon  Injectable 1 milliGRAM(s) IntraMuscular once  ibuprofen  Tablet. 400 milliGRAM(s) Oral every 6 hours  insulin glargine Injectable (LANTUS) 70 Unit(s) SubCutaneous at bedtime  insulin lispro (ADMELOG) corrective regimen sliding scale   SubCutaneous Before meals and at bedtime  insulin lispro Injectable (ADMELOG) 10 Unit(s) SubCutaneous three times a day before meals  labetalol 100 milliGRAM(s) Oral two times a day    MEDICATIONS  (PRN):  dextrose Oral Gel 15 Gram(s) Oral once PRN Blood Glucose LESS THAN 70 milliGRAM(s)/deciliter  oxyCODONE    IR 5 milliGRAM(s) Oral every 6 hours PRN Moderate Pain (4 - 6)  oxyCODONE    IR 10 milliGRAM(s) Oral every 4 hours PRN Severe Pain (7 - 10)      -------------------------------------------------------------------------------  LABS:                        11.2   8.45  )-----------( 361      ( 29 May 2023 05:30 )             36.7     05-29    136  |  101  |  17  ----------------------------<  205<H>  4.5   |  23  |  0.73    Ca    8.4      29 May 2023 05:30  Phos  3.1     05-28  Mg     1.8     05-29    TPro  7.1  /  Alb  3.3  /  TBili  0.2  /  DBili  x   /  AST  17  /  ALT  11  /  AlkPhos  126<H>  05-28    LIVER FUNCTIONS - ( 28 May 2023 05:30 )  Alb: 3.3 g/dL / Pro: 7.1 g/dL / ALK PHOS: 126 U/L / ALT: 11 U/L / AST: 17 U/L / GGT: x               CAPILLARY BLOOD GLUCOSE      POCT Blood Glucose.: 189 mg/dL (29 May 2023 09:12)      COVID-19 PCR: NotDetec (30 Apr 2023 11:45)  COVID-19 PCR: NotDetec (04 Apr 2023 02:06)  COVID-19 PCR: NotDetec (21 Mar 2023 18:12)      RADIOLOGY & ADDITIONAL TESTS: Reviewed.       HASMUKH AGUILERA, 43y, Female  MRN-[id number]  Patient is a 43y old  Female who presents with a chief complaint of Sepsis, UTI (29 May 2023 06:49)      OVERNIGHT EVENTS: FAUSTINO    SUBJECTIVE: Pt assessed at bedside, states she has been in pain throughout the night and has not slept. States pain is primarily in suprapubic cath area. Has some mild pain in other parts of body but not as bad.     12 Point ROS Negative unless noted otherwise above.  -------------------------------------------------------------------------------  VITAL SIGNS:  Vital Signs Last 24 Hrs  T(C): 36.9 (29 May 2023 05:54), Max: 36.9 (28 May 2023 12:15)  T(F): 98.5 (29 May 2023 05:54), Max: 98.5 (28 May 2023 12:15)  HR: 83 (29 May 2023 05:54) (80 - 89)  BP: 107/64 (29 May 2023 05:54) (107/64 - 135/93)  BP(mean): --  RR: 18 (29 May 2023 05:54) (18 - 18)  SpO2: 95% (29 May 2023 05:54) (95% - 98%)    Parameters below as of 28 May 2023 20:29  Patient On (Oxygen Delivery Method): room air      I&O's Summary      PHYSICAL EXAM:    General: Overweight female, agitated  Cardiac: +S1/S2; No murmurs, rubs, gallops.   Pulm: CTAB  Gastrointestinal: soft, NT/ND; no rebound or guarding; +BSx4. Ostomy site appears clean/dry.   : suprapubic catheter in place, bandage c/d/i, pain with palpation above site  Extremities: WWP, no clubbing or cyanosis; no peripheral edema  Musculoskeletal: NROM x4; no joint swelling, tenderness or erythema.  Vascular: 2+ radial, DP/PT pulses B/L  Neurologic: AAOx3    ALLERGIES:  Allergies    shellfish. (Hives; Anaphylaxis)  vancomycin (Anaphylaxis; Hives)  penicillin (Hives)  clindamycin (Pruritus)  amoxicillin (Unknown)  iodine containing compounds (Hives; Anaphylaxis)  fish (Hives; Urticaria)    Intolerances    Bactrim I.V. (Rash (Mod to Severe))      MEDICATIONS:  MEDICATIONS  (STANDING):  acetaminophen     Tablet .. 975 milliGRAM(s) Oral every 8 hours  albuterol    90 MICROgram(s) HFA Inhaler 1 Puff(s) Inhalation daily  apixaban 5 milliGRAM(s) Oral every 12 hours  dextrose 5%. 1000 milliLiter(s) (100 mL/Hr) IV Continuous <Continuous>  dextrose 5%. 1000 milliLiter(s) (50 mL/Hr) IV Continuous <Continuous>  dextrose 50% Injectable 25 Gram(s) IV Push once  dextrose 50% Injectable 12.5 Gram(s) IV Push once  dextrose 50% Injectable 25 Gram(s) IV Push once  ertapenem  IVPB 1000 milliGRAM(s) IV Intermittent every 24 hours  gentamicin   IVPB 400 milliGRAM(s) IV Intermittent every 24 hours  glucagon  Injectable 1 milliGRAM(s) IntraMuscular once  ibuprofen  Tablet. 400 milliGRAM(s) Oral every 6 hours  insulin glargine Injectable (LANTUS) 70 Unit(s) SubCutaneous at bedtime  insulin lispro (ADMELOG) corrective regimen sliding scale   SubCutaneous Before meals and at bedtime  insulin lispro Injectable (ADMELOG) 10 Unit(s) SubCutaneous three times a day before meals  labetalol 100 milliGRAM(s) Oral two times a day    MEDICATIONS  (PRN):  dextrose Oral Gel 15 Gram(s) Oral once PRN Blood Glucose LESS THAN 70 milliGRAM(s)/deciliter  oxyCODONE    IR 5 milliGRAM(s) Oral every 6 hours PRN Moderate Pain (4 - 6)  oxyCODONE    IR 10 milliGRAM(s) Oral every 4 hours PRN Severe Pain (7 - 10)      -------------------------------------------------------------------------------  LABS:                        11.2   8.45  )-----------( 361      ( 29 May 2023 05:30 )             36.7     05-29    136  |  101  |  17  ----------------------------<  205<H>  4.5   |  23  |  0.73    Ca    8.4      29 May 2023 05:30  Phos  3.1     05-28  Mg     1.8     05-29    TPro  7.1  /  Alb  3.3  /  TBili  0.2  /  DBili  x   /  AST  17  /  ALT  11  /  AlkPhos  126<H>  05-28    LIVER FUNCTIONS - ( 28 May 2023 05:30 )  Alb: 3.3 g/dL / Pro: 7.1 g/dL / ALK PHOS: 126 U/L / ALT: 11 U/L / AST: 17 U/L / GGT: x               CAPILLARY BLOOD GLUCOSE      POCT Blood Glucose.: 189 mg/dL (29 May 2023 09:12)      COVID-19 PCR: NotDetec (30 Apr 2023 11:45)  COVID-19 PCR: NotDetec (04 Apr 2023 02:06)  COVID-19 PCR: NotDetec (21 Mar 2023 18:12)      RADIOLOGY & ADDITIONAL TESTS: Reviewed.

## 2023-05-29 NOTE — PROGRESS NOTE ADULT - ATTENDING COMMENTS
44 yo F with PMHx asthma, hx PE (Eliquis), DM, HTN, HLD, hx abdominal necrotizing fasciitis (s/p suprapubic catheter, ostomy placement in 11/2021) px to [facility] ED from home with worsening abd/suprapubic pain and drainage from prior suprapubic catheter insertion site found to have sepsis 2/2 UTI and catheter-site infection.    -c/w ertapenem/gentamicin per ID, PICC tomorrow; 14 day course of each  -patient very emotional today about chronic illness/recurrent hospitalizations and undertreated pain which is limiting her ability to walk/move around so pain regimen adjusted and emotional support provided   -insulin regimen adjusted per endocrine recs   -hx PE - continue eliquis  -HTN - labetalol    Gauze over former suprapubic catheter site noted to be wet - asked nurse to re-dress area  Patient also complaining of discharge from belly button however at this time no erythema or discharge noted, will continue to monitor       Dispo: home pending PICC and home infusion set-up

## 2023-05-29 NOTE — PROGRESS NOTE ADULT - PROBLEM SELECTOR PLAN 4
CTAP in ED showing 4.5 cm right ovarian cyst. US Pelvis performed showing no demonstrated evidence of ovarian torsion and 3.7 cm simple appearing  right ovarian cyst..  - GYN consulted in ED, no acute interventions    Plan:  - Outpatient follow-up

## 2023-05-29 NOTE — PROGRESS NOTE ADULT - PROBLEM SELECTOR PLAN 6
Hx of HTN. Home medications include Labetalol 100 mg BID and Losartan 50mg Q24.     Plan:  - c/w labetalol 100mg BID  restart losartan if further BP control needed

## 2023-05-29 NOTE — PROGRESS NOTE ADULT - ASSESSMENT
42 yo F with PMHx asthma, hx PE (Eliquis), DM, HTN, HLD, hx abdominal necrotizing fasciitis (s/p suprapubic catheter, ostomy placement in 11/2021) px to [facility] ED from home with worsening abd/suprapubic pain and drainage from prior suprapubic catheter insertion site found to have sepsis sepsis 2/2 UTI and catheter-site infection.

## 2023-05-29 NOTE — PROGRESS NOTE ADULT - PROBLEM SELECTOR PLAN 3
Hx of DM. Last A1C 12 prior admission. Home regimen per prior discharge includes Humulin 100U TID. Endocrine consulted    Plan:  - Lantus 70u twice daily  - Lispro to 10 units with meals and bedtime  - Lispro mISS with meals and at bedtime  - goal -180 mg/dL.

## 2023-05-30 LAB
ALBUMIN SERPL ELPH-MCNC: 3.3 G/DL — SIGNIFICANT CHANGE UP (ref 3.3–5)
ALP SERPL-CCNC: 121 U/L — HIGH (ref 40–120)
ALT FLD-CCNC: 11 U/L — SIGNIFICANT CHANGE UP (ref 10–45)
ANION GAP SERPL CALC-SCNC: 9 MMOL/L — SIGNIFICANT CHANGE UP (ref 5–17)
APTT BLD: 37.5 SEC — HIGH (ref 27.5–35.5)
AST SERPL-CCNC: 15 U/L — SIGNIFICANT CHANGE UP (ref 10–40)
BASOPHILS # BLD AUTO: 0.03 K/UL — SIGNIFICANT CHANGE UP (ref 0–0.2)
BASOPHILS NFR BLD AUTO: 0.3 % — SIGNIFICANT CHANGE UP (ref 0–2)
BILIRUB SERPL-MCNC: 0.2 MG/DL — SIGNIFICANT CHANGE UP (ref 0.2–1.2)
BLD GP AB SCN SERPL QL: POSITIVE — SIGNIFICANT CHANGE UP
BUN SERPL-MCNC: 14 MG/DL — SIGNIFICANT CHANGE UP (ref 7–23)
CALCIUM SERPL-MCNC: 8.2 MG/DL — LOW (ref 8.4–10.5)
CHLORIDE SERPL-SCNC: 100 MMOL/L — SIGNIFICANT CHANGE UP (ref 96–108)
CO2 SERPL-SCNC: 23 MMOL/L — SIGNIFICANT CHANGE UP (ref 22–31)
CREAT SERPL-MCNC: 0.73 MG/DL — SIGNIFICANT CHANGE UP (ref 0.5–1.3)
EGFR: 105 ML/MIN/1.73M2 — SIGNIFICANT CHANGE UP
EOSINOPHIL # BLD AUTO: 0.24 K/UL — SIGNIFICANT CHANGE UP (ref 0–0.5)
EOSINOPHIL NFR BLD AUTO: 2.6 % — SIGNIFICANT CHANGE UP (ref 0–6)
GENTAMICIN SERPL-MCNC: 2.1 UG/ML
GLUCOSE BLDC GLUCOMTR-MCNC: 144 MG/DL — HIGH (ref 70–99)
GLUCOSE BLDC GLUCOMTR-MCNC: 196 MG/DL — HIGH (ref 70–99)
GLUCOSE BLDC GLUCOMTR-MCNC: 212 MG/DL — HIGH (ref 70–99)
GLUCOSE BLDC GLUCOMTR-MCNC: 327 MG/DL — HIGH (ref 70–99)
GLUCOSE SERPL-MCNC: 191 MG/DL — HIGH (ref 70–99)
HCT VFR BLD CALC: 38.3 % — SIGNIFICANT CHANGE UP (ref 34.5–45)
HGB BLD-MCNC: 11.4 G/DL — LOW (ref 11.5–15.5)
IMM GRANULOCYTES NFR BLD AUTO: 0.3 % — SIGNIFICANT CHANGE UP (ref 0–0.9)
INR BLD: 1.22 — HIGH (ref 0.88–1.16)
LYMPHOCYTES # BLD AUTO: 2.67 K/UL — SIGNIFICANT CHANGE UP (ref 1–3.3)
LYMPHOCYTES # BLD AUTO: 29.5 % — SIGNIFICANT CHANGE UP (ref 13–44)
MAGNESIUM SERPL-MCNC: 1.7 MG/DL — SIGNIFICANT CHANGE UP (ref 1.6–2.6)
MCHC RBC-ENTMCNC: 24.1 PG — LOW (ref 27–34)
MCHC RBC-ENTMCNC: 29.8 GM/DL — LOW (ref 32–36)
MCV RBC AUTO: 80.8 FL — SIGNIFICANT CHANGE UP (ref 80–100)
MONOCYTES # BLD AUTO: 0.82 K/UL — SIGNIFICANT CHANGE UP (ref 0–0.9)
MONOCYTES NFR BLD AUTO: 9.1 % — SIGNIFICANT CHANGE UP (ref 2–14)
NEUTROPHILS # BLD AUTO: 5.27 K/UL — SIGNIFICANT CHANGE UP (ref 1.8–7.4)
NEUTROPHILS NFR BLD AUTO: 58.2 % — SIGNIFICANT CHANGE UP (ref 43–77)
NRBC # BLD: 0 /100 WBCS — SIGNIFICANT CHANGE UP (ref 0–0)
PHOSPHATE SERPL-MCNC: 3.1 MG/DL — SIGNIFICANT CHANGE UP (ref 2.5–4.5)
PLATELET # BLD AUTO: 371 K/UL — SIGNIFICANT CHANGE UP (ref 150–400)
POTASSIUM SERPL-MCNC: 4.6 MMOL/L — SIGNIFICANT CHANGE UP (ref 3.5–5.3)
POTASSIUM SERPL-SCNC: 4.6 MMOL/L — SIGNIFICANT CHANGE UP (ref 3.5–5.3)
PROT SERPL-MCNC: 6.7 G/DL — SIGNIFICANT CHANGE UP (ref 6–8.3)
PROTHROM AB SERPL-ACNC: 14.6 SEC — HIGH (ref 10.5–13.4)
RBC # BLD: 4.74 M/UL — SIGNIFICANT CHANGE UP (ref 3.8–5.2)
RBC # FLD: 16 % — HIGH (ref 10.3–14.5)
RH IG SCN BLD-IMP: NEGATIVE — SIGNIFICANT CHANGE UP
SODIUM SERPL-SCNC: 132 MMOL/L — LOW (ref 135–145)
WBC # BLD: 9.06 K/UL — SIGNIFICANT CHANGE UP (ref 3.8–10.5)
WBC # FLD AUTO: 9.06 K/UL — SIGNIFICANT CHANGE UP (ref 3.8–10.5)

## 2023-05-30 PROCEDURE — 99233 SBSQ HOSP IP/OBS HIGH 50: CPT | Mod: GC

## 2023-05-30 PROCEDURE — 36569 INSJ PICC 5 YR+ W/O IMAGING: CPT

## 2023-05-30 PROCEDURE — 76937 US GUIDE VASCULAR ACCESS: CPT | Mod: 26,59

## 2023-05-30 PROCEDURE — 99232 SBSQ HOSP IP/OBS MODERATE 35: CPT | Mod: GC

## 2023-05-30 RX ORDER — CHLORHEXIDINE GLUCONATE 213 G/1000ML
1 SOLUTION TOPICAL
Refills: 0 | Status: DISCONTINUED | OUTPATIENT
Start: 2023-05-30 | End: 2023-06-01

## 2023-05-30 RX ORDER — HYDROMORPHONE HYDROCHLORIDE 2 MG/ML
1 INJECTION INTRAMUSCULAR; INTRAVENOUS; SUBCUTANEOUS ONCE
Refills: 0 | Status: DISCONTINUED | OUTPATIENT
Start: 2023-05-30 | End: 2023-05-30

## 2023-05-30 RX ORDER — OXYCODONE HYDROCHLORIDE 5 MG/1
10 TABLET ORAL EVERY 4 HOURS
Refills: 0 | Status: DISCONTINUED | OUTPATIENT
Start: 2023-05-30 | End: 2023-06-01

## 2023-05-30 RX ORDER — OXYBUTYNIN CHLORIDE 5 MG
5 TABLET ORAL EVERY 8 HOURS
Refills: 0 | Status: DISCONTINUED | OUTPATIENT
Start: 2023-05-30 | End: 2023-06-01

## 2023-05-30 RX ORDER — SODIUM CHLORIDE 9 MG/ML
10 INJECTION INTRAMUSCULAR; INTRAVENOUS; SUBCUTANEOUS
Refills: 0 | Status: DISCONTINUED | OUTPATIENT
Start: 2023-05-30 | End: 2023-06-01

## 2023-05-30 RX ORDER — LIDOCAINE 4 G/100G
1 CREAM TOPICAL EVERY 24 HOURS
Refills: 0 | Status: DISCONTINUED | OUTPATIENT
Start: 2023-05-30 | End: 2023-06-01

## 2023-05-30 RX ORDER — HYDROMORPHONE HYDROCHLORIDE 2 MG/ML
0.5 INJECTION INTRAMUSCULAR; INTRAVENOUS; SUBCUTANEOUS ONCE
Refills: 0 | Status: DISCONTINUED | OUTPATIENT
Start: 2023-05-30 | End: 2023-05-30

## 2023-05-30 RX ORDER — INSULIN LISPRO 100/ML
20 VIAL (ML) SUBCUTANEOUS
Refills: 0 | Status: DISCONTINUED | OUTPATIENT
Start: 2023-05-30 | End: 2023-05-31

## 2023-05-30 RX ORDER — OXYCODONE HYDROCHLORIDE 5 MG/1
5 TABLET ORAL EVERY 6 HOURS
Refills: 0 | Status: DISCONTINUED | OUTPATIENT
Start: 2023-05-30 | End: 2023-06-01

## 2023-05-30 RX ADMIN — Medication 400 MILLIGRAM(S): at 11:54

## 2023-05-30 RX ADMIN — APIXABAN 5 MILLIGRAM(S): 2.5 TABLET, FILM COATED ORAL at 18:27

## 2023-05-30 RX ADMIN — Medication 400 MILLIGRAM(S): at 00:10

## 2023-05-30 RX ADMIN — Medication 400 MILLIGRAM(S): at 23:04

## 2023-05-30 RX ADMIN — Medication 2: at 09:15

## 2023-05-30 RX ADMIN — Medication 4: at 18:30

## 2023-05-30 RX ADMIN — Medication 100 MILLIGRAM(S): at 06:00

## 2023-05-30 RX ADMIN — Medication 8: at 22:26

## 2023-05-30 RX ADMIN — OXYCODONE HYDROCHLORIDE 10 MILLIGRAM(S): 5 TABLET ORAL at 11:53

## 2023-05-30 RX ADMIN — ERTAPENEM SODIUM 120 MILLIGRAM(S): 1 INJECTION, POWDER, LYOPHILIZED, FOR SOLUTION INTRAMUSCULAR; INTRAVENOUS at 18:29

## 2023-05-30 RX ADMIN — Medication 10 UNIT(S): at 09:15

## 2023-05-30 RX ADMIN — HYDROMORPHONE HYDROCHLORIDE 1 MILLIGRAM(S): 2 INJECTION INTRAMUSCULAR; INTRAVENOUS; SUBCUTANEOUS at 16:30

## 2023-05-30 RX ADMIN — Medication 975 MILLIGRAM(S): at 06:32

## 2023-05-30 RX ADMIN — INSULIN GLARGINE 70 UNIT(S): 100 INJECTION, SOLUTION SUBCUTANEOUS at 22:25

## 2023-05-30 RX ADMIN — HYDROMORPHONE HYDROCHLORIDE 0.5 MILLIGRAM(S): 2 INJECTION INTRAMUSCULAR; INTRAVENOUS; SUBCUTANEOUS at 07:31

## 2023-05-30 RX ADMIN — OXYCODONE HYDROCHLORIDE 10 MILLIGRAM(S): 5 TABLET ORAL at 23:15

## 2023-05-30 RX ADMIN — Medication 10 UNIT(S): at 13:24

## 2023-05-30 RX ADMIN — Medication 200 MILLIGRAM(S): at 19:33

## 2023-05-30 RX ADMIN — OXYCODONE HYDROCHLORIDE 10 MILLIGRAM(S): 5 TABLET ORAL at 12:29

## 2023-05-30 RX ADMIN — HYDROMORPHONE HYDROCHLORIDE 0.5 MILLIGRAM(S): 2 INJECTION INTRAMUSCULAR; INTRAVENOUS; SUBCUTANEOUS at 15:20

## 2023-05-30 RX ADMIN — Medication 100 MILLIGRAM(S): at 18:28

## 2023-05-30 RX ADMIN — HYDROMORPHONE HYDROCHLORIDE 0.5 MILLIGRAM(S): 2 INJECTION INTRAMUSCULAR; INTRAVENOUS; SUBCUTANEOUS at 15:35

## 2023-05-30 RX ADMIN — APIXABAN 5 MILLIGRAM(S): 2.5 TABLET, FILM COATED ORAL at 05:51

## 2023-05-30 RX ADMIN — HYDROMORPHONE HYDROCHLORIDE 0.5 MILLIGRAM(S): 2 INJECTION INTRAMUSCULAR; INTRAVENOUS; SUBCUTANEOUS at 15:37

## 2023-05-30 RX ADMIN — LIDOCAINE 1 PATCH: 4 CREAM TOPICAL at 19:54

## 2023-05-30 RX ADMIN — INSULIN GLARGINE 70 UNIT(S): 100 INJECTION, SOLUTION SUBCUTANEOUS at 09:15

## 2023-05-30 RX ADMIN — HYDROMORPHONE HYDROCHLORIDE 0.5 MILLIGRAM(S): 2 INJECTION INTRAMUSCULAR; INTRAVENOUS; SUBCUTANEOUS at 01:40

## 2023-05-30 RX ADMIN — OXYCODONE HYDROCHLORIDE 5 MILLIGRAM(S): 5 TABLET ORAL at 19:55

## 2023-05-30 RX ADMIN — OXYCODONE HYDROCHLORIDE 10 MILLIGRAM(S): 5 TABLET ORAL at 22:25

## 2023-05-30 RX ADMIN — OXYCODONE HYDROCHLORIDE 5 MILLIGRAM(S): 5 TABLET ORAL at 18:48

## 2023-05-30 RX ADMIN — Medication 400 MILLIGRAM(S): at 00:40

## 2023-05-30 RX ADMIN — Medication 10 UNIT(S): at 18:31

## 2023-05-30 RX ADMIN — Medication 400 MILLIGRAM(S): at 06:32

## 2023-05-30 RX ADMIN — ALBUTEROL 1 PUFF(S): 90 AEROSOL, METERED ORAL at 09:24

## 2023-05-30 RX ADMIN — Medication 400 MILLIGRAM(S): at 12:29

## 2023-05-30 RX ADMIN — LIDOCAINE 1 PATCH: 4 CREAM TOPICAL at 13:25

## 2023-05-30 RX ADMIN — OXYCODONE HYDROCHLORIDE 10 MILLIGRAM(S): 5 TABLET ORAL at 05:52

## 2023-05-30 RX ADMIN — OXYCODONE HYDROCHLORIDE 10 MILLIGRAM(S): 5 TABLET ORAL at 00:07

## 2023-05-30 RX ADMIN — CHLORHEXIDINE GLUCONATE 1 APPLICATION(S): 213 SOLUTION TOPICAL at 19:33

## 2023-05-30 RX ADMIN — Medication 400 MILLIGRAM(S): at 06:02

## 2023-05-30 RX ADMIN — HYDROMORPHONE HYDROCHLORIDE 0.5 MILLIGRAM(S): 2 INJECTION INTRAMUSCULAR; INTRAVENOUS; SUBCUTANEOUS at 02:20

## 2023-05-30 RX ADMIN — Medication 400 MILLIGRAM(S): at 18:28

## 2023-05-30 RX ADMIN — OXYCODONE HYDROCHLORIDE 10 MILLIGRAM(S): 5 TABLET ORAL at 06:28

## 2023-05-30 RX ADMIN — OXYCODONE HYDROCHLORIDE 10 MILLIGRAM(S): 5 TABLET ORAL at 00:37

## 2023-05-30 RX ADMIN — HYDROMORPHONE HYDROCHLORIDE 1 MILLIGRAM(S): 2 INJECTION INTRAMUSCULAR; INTRAVENOUS; SUBCUTANEOUS at 16:15

## 2023-05-30 RX ADMIN — Medication 400 MILLIGRAM(S): at 19:54

## 2023-05-30 RX ADMIN — HYDROMORPHONE HYDROCHLORIDE 0.5 MILLIGRAM(S): 2 INJECTION INTRAMUSCULAR; INTRAVENOUS; SUBCUTANEOUS at 15:52

## 2023-05-30 RX ADMIN — Medication 975 MILLIGRAM(S): at 06:01

## 2023-05-30 NOTE — CONSULT NOTE ADULT - SUBJECTIVE AND OBJECTIVE BOX
Vascular Access Service Consult Note    43yFemaleHEALTH ISSUES - PROBLEM Dx:  Severe sepsis    DM (diabetes mellitus)    HTN (hypertension)    Pulmonary embolism    Nutrition, metabolism, and development symptoms    UTI (urinary tract infection)    Hypokalemia    Right ovarian cyst    History of creation of ostomy    Asthma               Diagnosis:    Indications for Vascular Access (Check all that apply)  [ x ]  Antibiotic Therapy       Antibiotic Prescribed:                       gentamicin & ertapenem                                              Expected Duration of Therapy:             Gentamicin end 6/5/23);ertapenem (ends 6/9/23)    [  ]  IV Hydration  [  ]  Total Parenteral Nutrition  [  ]  Chemotherapy  [  ]  Difficult Venous Access  [  ]  CVP monitoring  [  ]  Medications with high potential for tissue necrosis on extravasation  [  ]  Other    Screening (Check all that apply)  Previous Radiation to chest  [  ] Yes      [x  ]  No  Breast Cancer                          [  ] Left     [  ]  Right    [x  ]  No  Lymph Node Dissection         [  ] Left     [  ]  Right    [x  ]  No  Pacemaker or ICD                   [  ] Left     [  ]  Right    [ x ]  No  Upper Extremity DVT             [  ] Left     [  ]  Right    [x  ]  No  Chronic Kidney Disease         [  ]  Yes     [ x ]  No  Hemodialysis                           [  ]  Yes     [ x ]  No  AV Fistula/ Graft                     [  ]  Left    [  ]  Right    [x  ]  No  Temp>101F in past 24 H       [  ]  Yes     [ x ]  No  H/O PICC/Midline                   [  ]  Yes     [ x ]  No    Lab data:                        11.4   9.06  )-----------( 371      ( 30 May 2023 05:30 )             38.3     05-30    132<L>  |  100  |  14  ----------------------------<  191<H>  4.6   |  23  |  0.73    Ca    8.2<L>      30 May 2023 05:30  Phos  3.1     05-30  Mg     1.7     05-30    TPro  6.7  /  Alb  3.3  /  TBili  0.2  /  DBili  x   /  AST  15  /  ALT  11  /  AlkPhos  121<H>  05-30    PT/INR - ( 30 May 2023 05:30 )   PT: 14.6 sec;   INR: 1.22          PTT - ( 30 May 2023 05:30 )  PTT:37.5 sec          I have reviewed the chart, interviewed and examined the patient and determined that this patient:  [  ] Is a candidate for a PICC line  [ x ] Is a candidate for a Midline, as discussed with infusion service company   [  ] Is not a candidate for vascular access device (reason)    Lumens:    [x] Single  [  ] Double Vascular Access Service Consult Note    43yFemaleHEALTH ISSUES - PROBLEM Dx:  Severe sepsis    DM (diabetes mellitus)    HTN (hypertension)    Pulmonary embolism    Nutrition, metabolism, and development symptoms    UTI (urinary tract infection)    Hypokalemia    Right ovarian cyst    History of creation of ostomy    Asthma               Diagnosis:    Indications for Vascular Access (Check all that apply)  [ x ]  Antibiotic Therapy       Antibiotic Prescribed:                       gentamicin & ertapenem                                              Expected Duration of Therapy:             Gentamicin end 6/5/23);ertapenem (ends 6/9/23)    [  ]  IV Hydration  [  ]  Total Parenteral Nutrition  [  ]  Chemotherapy  [  ]  Difficult Venous Access  [  ]  CVP monitoring  [  ]  Medications with high potential for tissue necrosis on extravasation  [  ]  Other    Screening (Check all that apply)  Previous Radiation to chest  [  ] Yes      [x  ]  No  Breast Cancer                          [  ] Left     [  ]  Right    [x  ]  No  Lymph Node Dissection         [  ] Left     [  ]  Right    [x  ]  No  Pacemaker or ICD                   [  ] Left     [  ]  Right    [ x ]  No  Upper Extremity DVT             [  ] Left     [  ]  Right    [x  ]  No  Chronic Kidney Disease         [  ]  Yes     [ x ]  No  Hemodialysis                           [  ]  Yes     [ x ]  No  AV Fistula/ Graft                     [  ]  Left    [  ]  Right    [x  ]  No  Temp>101F in past 24 H       [  ]  Yes     [ x ]  No  H/O PICC/Midline                   [  ]  Yes     [ x ]  No    Lab data:                        11.4   9.06  )-----------( 371      ( 30 May 2023 05:30 )             38.3     05-30    132<L>  |  100  |  14  ----------------------------<  191<H>  4.6   |  23  |  0.73    Ca    8.2<L>      30 May 2023 05:30  Phos  3.1     05-30  Mg     1.7     05-30    TPro  6.7  /  Alb  3.3  /  TBili  0.2  /  DBili  x   /  AST  15  /  ALT  11  /  AlkPhos  121<H>  05-30    PT/INR - ( 30 May 2023 05:30 )   PT: 14.6 sec;   INR: 1.22          PTT - ( 30 May 2023 05:30 )  PTT:37.5 sec          I have reviewed the chart, interviewed and examined the patient and determined that this patient:  [  ] Is a candidate for a PICC line  [ x ] Is a candidate for a Midline, as discussed with infusion service company   [  ] Is not a candidate for vascular access device (reason)    Lumens:    [ ] Single  [ x ] Double

## 2023-05-30 NOTE — CONSULT NOTE ADULT - CONSULT REQUESTED DATE/TIME
23-May-2023 08:59
23-May-2023 09:52
25-May-2023 16:05
27-May-2023 14:05
30-May-2023 14:46
23-May-2023 12:14

## 2023-05-30 NOTE — PROCEDURE NOTE - NSINFORMCONSENT_GEN_A_CORE
Benefits, risks, and possible complications of procedure explained to patient/caregiver who verbalized understanding and gave verbal consent.
Benefits, risks, and possible complications of procedure explained to patient/caregiver who verbalized understanding and gave verbal consent.
Benefits, risks, and possible complications of procedure explained to patient/caregiver who verbalized understanding and gave written consent.
Benefits, risks, and possible complications of procedure explained to patient/caregiver who verbalized understanding and gave verbal consent.

## 2023-05-30 NOTE — PROGRESS NOTE ADULT - PROBLEM SELECTOR PLAN 8
Hx of asthma. Well-controlled.    Plan:  - Continue Ventolin Q24 PRN Hx of asthma. Well-controlled.    Plan:  - Continue home Ventolin Q24 PRN

## 2023-05-30 NOTE — PROGRESS NOTE ADULT - PROBLEM SELECTOR PLAN 2
Admitted for severe sepsis 2/2 UTI. UA positive showing small LE, Positive nitrites, >10 WBC. Prior urine cx growing carbapenem-resistant and ESBL Klebsiella. s/p 2L NS, Gentamicin in ED. ID following    Plan:  - c/w Ertapenem 1g IV daily - ends 6/09/23  - c/w Gentamicin 400mg IV daily - end 06/05/23  - PICC on tuesday Admitted for severe sepsis 2/2 UTI. UA positive showing small LE, Positive nitrites, >10 WBC. Prior urine cx growing carbapenem-resistant and ESBL Klebsiella. s/p 2L NS, Gentamicin in ED. ID and urology following    Plan:  - c/w Ertapenem 1g IV daily - last day: 6/09/23  - c/w Gentamicin 400mg IV daily - last day: 06/05/23  - plan to place PICC today    #Suprapubic/Abd pain  - start oxybutynin 5mg TID PRN per urology for bladder spasms to trial  - pain regimen: Tylenol 975mg q8hrs, Ibuprofen 200mg q6hrs, Oxycodone IR PRN (moderate 5mg q6h, severe 10mg q4h)  - add lidocaine patch for R midback paraspinal tenderness

## 2023-05-30 NOTE — PROGRESS NOTE ADULT - ATTENDING COMMENTS
Pt seen on rounds this afternoon and events of the weekend reviewed.    Is well-known to our service.  Has now been re-admitted with urosepsis   Main physical complaint at this point is discomfort from the suprapubic catheter.  Is also concerned about dark exudate in the umbilicus  Markedly obese woman with insulin-requiring type 2 DM, extremely insulin-resistant, managed as outpatient on U-500 insulin.  Is on basal/bolus when in the hospital.  Fingersticks are currently 140-230 mg% on 70 Lantus BID plus premeal lispro 10 units TID.  PO intake as noted by Dr. Cherry is quite limited, but will increase the premeal lispro to 20 units TID  Continue the Lantus at 70 BID Pt seen on rounds this afternoon and events of the weekend reviewed.    Is well-known to our service.  Has now been re-admitted with suprapubic pain and drainage from previous suprapubic catheter site   Main physical complaint at this point is discomfort from the suprapubic catheter.  Is also concerned about dark exudate in the umbilicus  Markedly obese woman with insulin-requiring type 2 DM, extremely insulin-resistant, managed as outpatient on U-500 insulin.  Is on basal/bolus when in the hospital.  Fingersticks are currently 140-230 mg% on 70 Lantus BID plus premeal lispro 10 units TID.  PO intake as noted by Dr. Cherry is quite limited, but will increase the premeal lispro to 20 units TID  Continue the Lantus at 70 BID

## 2023-05-30 NOTE — PROGRESS NOTE ADULT - ASSESSMENT
42 y/o F with PMHx hx PE (Eliquis), DM, HTN, HLD, hx abdominal necrotizing fasciitis (s/p suprapubic catheter, ostomy placement in 11/2021), admitted to medicine for sepsis 2/2 complicated UTI and infection at SPT site. SPT culture growing >100,000 Klebsiella pneumoniae.     Recommendations:  -Appreciate excellent care per primary team  - Recommend trial of oxybutynin 5mg TID PRN for bladder spasms to see if alleviate patient suprapubic pain  -Removing the SPT would be an option, however patient reports severe dysuria and generalized abdominal pain when voiding spontaneously, so there is concern for urinary retention without the suprapubic catheter. She is also not amenable to removing it completely.  -Rest of care per primary team

## 2023-05-30 NOTE — PROGRESS NOTE ADULT - PROBLEM SELECTOR PLAN 9
F: s/p 2L NS in ED  E: Replete PRN  DVT ppx: Eliqius   GI ppx: None  Bowel: None  Dispo: [facility]    PCP: Dr. Pelon Sánchez ([place] [facility])     FULL CODE

## 2023-05-30 NOTE — PROGRESS NOTE ADULT - ASSESSMENT
44 yo F with PMHx asthma, hx PE (Eliquis), DM, HTN, HLD, hx abdominal necrotizing fasciitis (s/p suprapubic catheter, ostomy placement in 11/2021) px to [facility] ED from home with worsening abd/suprapubic pain and drainage from prior suprapubic catheter insertion site found to have sepsis sepsis 2/2 UTI and catheter-site infection. 44 yo F with PMHx asthma, hx PE (Eliquis), DM, HTN, HLD, hx abdominal necrotizing fasciitis (s/p suprapubic catheter, ostomy placement in 11/2021) who presented to [facility] ED from home with worsening abdominal and suprapubic pain and drainage from prior suprapubic catheter insertion site, admitted for sepsis 2/2 UTI and catheter-site infection on gentamicin and ertapenem.

## 2023-05-30 NOTE — PROGRESS NOTE ADULT - PROBLEM SELECTOR PLAN 1
Px with worsening lower abd pain, drainage from suprapubic catheter site. Meeting 2/4 SIRS (HR, WBC) with elevated lactate 5.3 (cleared after fluids to 1.7). CTAP largely unremarkable. On exam, current cath site with surrounding skin changes and old site open with visible underlying catheter. Likely source UTI given positive UA, hx of UTI, and prior urine cx growing carbapenem-resistant and ESBL Klebsiella.  - s/p IR replacement of suprapubic tube iso complicated UTI/sepsis (5/25)    Plan:  - c/w Gentamicin 400mg (weight based) w AM level (goal <2)  - Suprapubic Cath cultures (5/22): +Klebsiella, + Ecoli, pending susceptibilities   - Blood cultures (5/23) NGTD   - Pain management: Tylenol 975mg q8hrs, Ibuprofen 200mg q6hrs, PRN oxy (moderate 5mg q6, severe 10mg q4)  - Urology following, f/u recs   - ID following, f/u recs. Presented with worsening lower abd pain, drainage from suprapubic catheter site. On admission, met 2/4 SIRS (HR, WBC) with elevated lactate 5.3 (cleared after fluids to 1.7). CTAP largely unremarkable. On exam, current cath site with surrounding skin changes and old site open with visible underlying catheter.   - Infection source = UTI; UCx (5/22) growing carbapenem-resistant Klebsiella & E. Coli  - Blood cultures (5/23) NGTD   - S/p suprapubic catheter replacement by IR on 5/25 given complicated UTI/sepsis    Plan:  - c/w gentamicin 400mg q24h (weight based), AM level on 5/30 was 2.1  - c/w ertapenem 1000mg q24h   - Urology following, f/u recs   - ID following, f/u recs.

## 2023-05-30 NOTE — PROGRESS NOTE ADULT - PROBLEM SELECTOR PLAN 4
CTAP in ED showing 4.5 cm right ovarian cyst. US Pelvis performed showing no demonstrated evidence of ovarian torsion and 3.7 cm simple appearing  right ovarian cyst..  - GYN consulted in ED, no acute interventions    Plan:  - Outpatient follow-up CTAP in ED showing 4.5 cm right ovarian cyst. US Pelvis performed showing no demonstrated evidence of ovarian torsion and 3.7 cm simple appearing right ovarian cyst..  - GYN consulted in ED, no acute interventions    Plan:  - Outpatient follow-up

## 2023-05-30 NOTE — CONSULT NOTE ADULT - CONSULT REASON
sepsis
SPT site discomfort
Assistance with glycemic management
evaluation for PICC line
request for suprapubic tube upsize
right ovarian cyst

## 2023-05-30 NOTE — PROGRESS NOTE ADULT - PROBLEM SELECTOR PLAN 3
Hx of DM. Last A1C 12 prior admission. Home regimen per prior discharge includes Humulin 100U TID. Endocrine consulted    Plan:  - Lantus 70u twice daily  - Lispro to 10 units with meals and bedtime  - Lispro mISS with meals and at bedtime  - goal -180 mg/dL. Hx of DM. Last A1C 12.2% prior admission. Was discharged on May 3rd on Humulin U-500 100 units TID. Endocrinology consulted, titrating inpatient regimen daily.    Plan:  - Lantus 70u twice daily  - Lispro 10 units with meals and bedtime  - Lispro mISS with meals and at bedtime  - goal -180 mg/dL.

## 2023-05-30 NOTE — PROGRESS NOTE ADULT - SUBJECTIVE AND OBJECTIVE BOX
SUBJECTIVE / INTERVAL HPI: Patient was seen and examined this morning.     CAPILLARY BLOOD GLUCOSE & INSULIN RECEIVED  218 mg/dL (05-28 @ 22:32) ? 70 units of lantus + 4 units of lispro sliding scale.   189 mg/dL (05-29 @ 09:12) ? 10 units of lispro + 2 units of lispro sliding scale.   187 mg/dL (05-29 @ 11:38) ? 70 units of lantus.   211 mg/dL (05-29 @ 12:59) ? 10 units of lispro + 4 units of lispro sliding scale.   225 mg/dL (05-29 @ 17:32) ? 10 units of lispro + 4 units of lispro sliding scale.   266 mg/dL (05-29 @ 22:06) ? 70 units of lantus + 10 units of lispro + 6 units of lispro sliding scale.   196 mg/dL (05-30 @ 09:10) ? 70 units of lantus + 10 units of lispro + 2 units of lispro sliding scale.    REVIEW OF SYSTEMS  Constitutional:  Negative fever, chills or loss of appetite.  Eyes:  Negative blurry vision or double vision.  Cardiovascular:  Negative for chest pain or palpitations.  Respiratory:  Negative for cough, wheezing, or shortness of breath.    Gastrointestinal:  Negative for nausea, vomiting, diarrhea, constipation, or abdominal pain.  Genitourinary:  Negative frequency, urgency or dysuria.  Neurologic:  No headache, confusion, dizziness, lightheadedness.    PHYSICAL EXAM  Vital Signs Last 24 Hrs  T(C): 37.2 (30 May 2023 12:40), Max: 37.2 (30 May 2023 12:40)  T(F): 98.9 (30 May 2023 12:40), Max: 98.9 (30 May 2023 12:40)  HR: 89 (30 May 2023 18:20) (88 - 100)  BP: 133/82 (30 May 2023 18:20) (119/71 - 171/98)  BP(mean): --  RR: 18 (30 May 2023 18:20) (16 - 18)  SpO2: 100% (30 May 2023 18:20) (97% - 100%)    Parameters below as of 30 May 2023 18:20  Patient On (Oxygen Delivery Method): room air    Constitutional: Awake, alert, in no acute distress.   HEENT: Normocephalic, atraumatic, NAOMI.  Respiratory: Lungs clear to ausculation bilaterally.   Cardiovascular: regular rhythm, normal S1 and S2, no audible murmurs.   GI: soft, non-tender, non-distended, bowel sounds present.  Extremities: No lower extremity edema.  Psychiatric: AAO x 3. Normal affect/mood.     LABS  CBC - WBC/HGB/HTC/PLT: 9.06/11.4/38.3/371 (05-30-23)  BMP - Na/K/Cl/Bicarb/BUN/Cr/Gluc/AG/eGFR: 132/4.6/100/23/14/0.73/191/9/105 (05-30-23)  Ca - 8.2 (05-30-23)  Phos - 3.1 (05-30-23)  Mg - 1.7 (05-30-23)  LFT - Alb/Tprot/Tbili/Dbili/AlkPhos/ALT/AST: 3.3/--/0.2/--/121/11/15 (05-30-23)  PT/aPTT/INR: 14.6/37.5/1.22 (05-30-23)   Thyroid Stimulating Hormone, Serum: 0.851 (04-28-23)    MEDICATIONS  MEDICATIONS  (STANDING):  acetaminophen     Tablet .. 975 milliGRAM(s) Oral every 8 hours  albuterol    90 MICROgram(s) HFA Inhaler 1 Puff(s) Inhalation daily  apixaban 5 milliGRAM(s) Oral every 12 hours  chlorhexidine 2% Cloths 1 Application(s) Topical <User Schedule>  dextrose 5%. 1000 milliLiter(s) (50 mL/Hr) IV Continuous <Continuous>  dextrose 5%. 1000 milliLiter(s) (100 mL/Hr) IV Continuous <Continuous>  dextrose 50% Injectable 25 Gram(s) IV Push once  dextrose 50% Injectable 12.5 Gram(s) IV Push once  dextrose 50% Injectable 25 Gram(s) IV Push once  ertapenem  IVPB 1000 milliGRAM(s) IV Intermittent every 24 hours  gentamicin   IVPB 400 milliGRAM(s) IV Intermittent every 24 hours  glucagon  Injectable 1 milliGRAM(s) IntraMuscular once  ibuprofen  Tablet. 400 milliGRAM(s) Oral every 6 hours  insulin glargine Injectable (LANTUS) 70 Unit(s) SubCutaneous two times a day  insulin lispro (ADMELOG) corrective regimen sliding scale   SubCutaneous Before meals and at bedtime  insulin lispro Injectable (ADMELOG) 20 Unit(s) SubCutaneous three times a day before meals  labetalol 100 milliGRAM(s) Oral two times a day  lidocaine   4% Patch 1 Patch Transdermal every 24 hours    MEDICATIONS  (PRN):  dextrose Oral Gel 15 Gram(s) Oral once PRN Blood Glucose LESS THAN 70 milliGRAM(s)/deciliter  oxybutynin 5 milliGRAM(s) Oral every 8 hours PRN Bladder spasms  oxyCODONE    IR 10 milliGRAM(s) Oral every 4 hours PRN Severe Pain (7 - 10)  oxyCODONE    IR 5 milliGRAM(s) Oral every 6 hours PRN Moderate Pain (4 - 6)  sodium chloride 0.9% lock flush 10 milliLiter(s) IV Push every 1 hour PRN Pre/post blood products, medications, blood draw, and to maintain line patency    ASSESSMENT / RECOMMENDATIONS  Ms. Katz is a 43 year-old woman with a history of pulmonary embolism, type 2 diabetes mellitus, abdominal necrotizing fasciitis status post suprapubic Cather and ostomy placement in November 2021 admitted with sepsis due to urinary tract infection and catheter-site reaction. We were consulted for assistance in glycemic management.    A1C: 12.2 %  BUN: 14  Creatinine: 0.73  GFR: 105  Weight: 105.2  BMI: 36.3    # Type 2 diabetes mellitus with hyperglycemia  - Please continue lantus *** units at bedtime.   - Continue lispro *** units before each meal.  - Continue lispro moderate / low dose sliding scale before meals and at bedtime.  - Patient's fingerstick glucose goal is 100-180 mg/dL.    - Discharge recommendations to be discussed.   - Patient can follow up at discharge with [facility] by calling [phone] to make an appointment.      Case discussed with Dr. Bowden. Primary team updated.       Gerald Qiu    Endocrinology Fellow    Service Pager: [phone]    SUBJECTIVE / INTERVAL HPI: Patient was seen and examined this morning. She reports having decreased appetite. She has been eating a yogurt parfait for most of her meals +/- a salad. The patient mentioned having suprapubic pain which she feels is also affecting her glucose. Blood glucose levels have been above target.     CAPILLARY BLOOD GLUCOSE & INSULIN RECEIVED  218 mg/dL (05-28 @ 22:32) ? 70 units of lantus + 4 units of lispro sliding scale.   189 mg/dL (05-29 @ 09:12) ? 10 units of lispro + 2 units of lispro sliding scale.   187 mg/dL (05-29 @ 11:38) ? 70 units of lantus.   211 mg/dL (05-29 @ 12:59) ? 10 units of lispro + 4 units of lispro sliding scale.   225 mg/dL (05-29 @ 17:32) ? 10 units of lispro + 4 units of lispro sliding scale.   266 mg/dL (05-29 @ 22:06) ? 70 units of lantus + 10 units of lispro + 6 units of lispro sliding scale.   196 mg/dL (05-30 @ 09:10) ? 70 units of lantus + 10 units of lispro + 2 units of lispro sliding scale.    REVIEW OF SYSTEMS  Constitutional:  (+) loss of appetite. Negative fever, chills.   Cardiovascular:  Negative for chest pain or palpitations.  Respiratory:  Negative for cough, wheezing, or shortness of breath.    Gastrointestinal:  (+) Nausea. Negative for vomiting, diarrhea, constipation, or abdominal pain.  Genitourinary:  (+) Suprapubic pain.  Neurologic:  No headache, confusion, dizziness, lightheadedness.    PHYSICAL EXAM  Vital Signs Last 24 Hrs  T(C): 37.2 (30 May 2023 12:40), Max: 37.2 (30 May 2023 12:40)  T(F): 98.9 (30 May 2023 12:40), Max: 98.9 (30 May 2023 12:40)  HR: 89 (30 May 2023 18:20) (88 - 100)  BP: 133/82 (30 May 2023 18:20) (119/71 - 171/98)  BP(mean): --  RR: 18 (30 May 2023 18:20) (16 - 18)  SpO2: 100% (30 May 2023 18:20) (97% - 100%)    Parameters below as of 30 May 2023 18:20  Patient On (Oxygen Delivery Method): room air    Constitutional: Awake, alert, obese female, in no acute distress.   HEENT: Normocephalic, atraumatic, NAOMI.  Respiratory: Lungs clear to ausculation bilaterally.   Cardiovascular: regular rhythm, normal S1 and S2, no audible murmurs.   GI: soft, non-tender, non-distended, bowel sounds present. (+) Ostomy bag in place.   : (+) Suprapubic catheter.   Extremities: No lower extremity edema.  Psychiatric: AAO x 3. Normal affect/mood.     LABS  CBC - WBC/HGB/HTC/PLT: 9.06/11.4/38.3/371 (05-30-23)  BMP - Na/K/Cl/Bicarb/BUN/Cr/Gluc/AG/eGFR: 132/4.6/100/23/14/0.73/191/9/105 (05-30-23)  Ca - 8.2 (05-30-23)  Phos - 3.1 (05-30-23)  Mg - 1.7 (05-30-23)  LFT - Alb/Tprot/Tbili/Dbili/AlkPhos/ALT/AST: 3.3/--/0.2/--/121/11/15 (05-30-23)  PT/aPTT/INR: 14.6/37.5/1.22 (05-30-23)   Thyroid Stimulating Hormone, Serum: 0.851 (04-28-23)    MEDICATIONS  MEDICATIONS  (STANDING):  acetaminophen     Tablet .. 975 milliGRAM(s) Oral every 8 hours  albuterol    90 MICROgram(s) HFA Inhaler 1 Puff(s) Inhalation daily  apixaban 5 milliGRAM(s) Oral every 12 hours  chlorhexidine 2% Cloths 1 Application(s) Topical <User Schedule>  dextrose 5%. 1000 milliLiter(s) (50 mL/Hr) IV Continuous <Continuous>  dextrose 5%. 1000 milliLiter(s) (100 mL/Hr) IV Continuous <Continuous>  dextrose 50% Injectable 25 Gram(s) IV Push once  dextrose 50% Injectable 12.5 Gram(s) IV Push once  dextrose 50% Injectable 25 Gram(s) IV Push once  ertapenem  IVPB 1000 milliGRAM(s) IV Intermittent every 24 hours  gentamicin   IVPB 400 milliGRAM(s) IV Intermittent every 24 hours  glucagon  Injectable 1 milliGRAM(s) IntraMuscular once  ibuprofen  Tablet. 400 milliGRAM(s) Oral every 6 hours  insulin glargine Injectable (LANTUS) 70 Unit(s) SubCutaneous two times a day  insulin lispro (ADMELOG) corrective regimen sliding scale   SubCutaneous Before meals and at bedtime  insulin lispro Injectable (ADMELOG) 20 Unit(s) SubCutaneous three times a day before meals  labetalol 100 milliGRAM(s) Oral two times a day  lidocaine   4% Patch 1 Patch Transdermal every 24 hours    MEDICATIONS  (PRN):  dextrose Oral Gel 15 Gram(s) Oral once PRN Blood Glucose LESS THAN 70 milliGRAM(s)/deciliter  oxybutynin 5 milliGRAM(s) Oral every 8 hours PRN Bladder spasms  oxyCODONE    IR 10 milliGRAM(s) Oral every 4 hours PRN Severe Pain (7 - 10)  oxyCODONE    IR 5 milliGRAM(s) Oral every 6 hours PRN Moderate Pain (4 - 6)  sodium chloride 0.9% lock flush 10 milliLiter(s) IV Push every 1 hour PRN Pre/post blood products, medications, blood draw, and to maintain line patency    ASSESSMENT / RECOMMENDATIONS  Ms. Katz is a 43 year-old woman with a history of pulmonary embolism, type 2 diabetes mellitus, abdominal necrotizing fasciitis status post suprapubic catheter and ostomy placement in November 2021, now admitted with sepsis due to urinary tract infection and catheter-site reaction. We were consulted for assistance in glycemic management.    A1C: 12.2 %  BUN: 14  Creatinine: 0.73  GFR: 105  Weight: 105.2  BMI: 36.3    # Type 2 diabetes mellitus with hyperglycemia  - Blood glucose levels have been above target. She continues to develop post-prandial hyperglycemia despite reported decreased appetite. Therefore, would recommend to increase premeal insulin.   - Please continue lantus 70 units twice daily   - INCREASE lispro to 20 units before each meal.  - Continue lispro moderate dose sliding scale before meals and at bedtime.  - Patient's fingerstick glucose goal is 100-180 mg/dL.    - Discharge recommendations to be discussed.   - Patient can follow up at discharge with Dr. Joseph at [facility] by calling [phone] to make an appointment.      Case discussed with Dr. Bowden. Primary team updated.       Gerald Qiu    Endocrinology Fellow    Service Pager: [phone]

## 2023-05-30 NOTE — PROGRESS NOTE ADULT - SUBJECTIVE AND OBJECTIVE BOX
**Incomplete Note**   CC: Patient is a 43y old  Female who presents with a chief complaint of Sepsis, UTI (30 May 2023 07:34)      INTERVAL EVENTS: FAUSTINO    SUBJECTIVE / INTERVAL HPI: Patient seen and examined at bedside.     ROS: negative unless otherwise stated above.    VITAL SIGNS:  Vital Signs Last 24 Hrs  T(C): 36.5 (29 May 2023 20:12), Max: 36.5 (29 May 2023 20:12)  T(F): 97.7 (29 May 2023 20:12), Max: 97.7 (29 May 2023 20:12)  HR: 100 (29 May 2023 20:12) (100 - 100)  BP: 119/71 (29 May 2023 20:12) (119/71 - 119/71)  BP(mean): --  RR: 16 (29 May 2023 20:12) (16 - 16)  SpO2: 97% (29 May 2023 20:12) (97% - 97%)    Parameters below as of 29 May 2023 20:12  Patient On (Oxygen Delivery Method): room air            PHYSICAL EXAM:    General: NAD  HEENT: MMM  Neck: supple  Cardiovascular: +S1/S2; RRR  Respiratory: CTA B/L; no W/R/R  Gastrointestinal: soft, NT/ND  Extremities: WWP; no edema, clubbing or cyanosis  Vascular: 2+ radial, DP/PT pulses B/L  Neurological: AAOx3; no focal deficits    MEDICATIONS:  MEDICATIONS  (STANDING):  acetaminophen     Tablet .. 975 milliGRAM(s) Oral every 8 hours  albuterol    90 MICROgram(s) HFA Inhaler 1 Puff(s) Inhalation daily  apixaban 5 milliGRAM(s) Oral every 12 hours  dextrose 5%. 1000 milliLiter(s) (50 mL/Hr) IV Continuous <Continuous>  dextrose 5%. 1000 milliLiter(s) (100 mL/Hr) IV Continuous <Continuous>  dextrose 50% Injectable 25 Gram(s) IV Push once  dextrose 50% Injectable 12.5 Gram(s) IV Push once  dextrose 50% Injectable 25 Gram(s) IV Push once  ertapenem  IVPB 1000 milliGRAM(s) IV Intermittent every 24 hours  gentamicin   IVPB 400 milliGRAM(s) IV Intermittent every 24 hours  glucagon  Injectable 1 milliGRAM(s) IntraMuscular once  ibuprofen  Tablet. 400 milliGRAM(s) Oral every 6 hours  insulin glargine Injectable (LANTUS) 70 Unit(s) SubCutaneous two times a day  insulin lispro (ADMELOG) corrective regimen sliding scale   SubCutaneous Before meals and at bedtime  insulin lispro Injectable (ADMELOG) 10 Unit(s) SubCutaneous three times a day before meals  insulin lispro Injectable (ADMELOG) 10 Unit(s) SubCutaneous at bedtime  labetalol 100 milliGRAM(s) Oral two times a day    MEDICATIONS  (PRN):  dextrose Oral Gel 15 Gram(s) Oral once PRN Blood Glucose LESS THAN 70 milliGRAM(s)/deciliter  HYDROmorphone  Injectable 0.5 milliGRAM(s) IV Push every 3 hours PRN breakthrough pain after trying oxycodone  oxybutynin 5 milliGRAM(s) Oral every 8 hours PRN Bladder spasms  oxyCODONE    IR 5 milliGRAM(s) Oral every 6 hours PRN Moderate Pain (4 - 6)  oxyCODONE    IR 10 milliGRAM(s) Oral every 4 hours PRN Severe Pain (7 - 10)      ALLERGIES:  Allergies    shellfish. (Hives; Anaphylaxis)  vancomycin (Anaphylaxis; Hives)  penicillin (Hives)  clindamycin (Pruritus)  amoxicillin (Unknown)  iodine containing compounds (Hives; Anaphylaxis)  fish (Hives; Urticaria)    Intolerances    Bactrim I.V. (Rash (Mod to Severe))      LABS:                        11.4   9.06  )-----------( 371      ( 30 May 2023 05:30 )             38.3     05-30    132<L>  |  100  |  14  ----------------------------<  191<H>  4.6   |  23  |  0.73    Ca    8.2<L>      30 May 2023 05:30  Phos  3.1     05-30  Mg     1.7     05-30    TPro  6.7  /  Alb  3.3  /  TBili  0.2  /  DBili  x   /  AST  15  /  ALT  11  /  AlkPhos  121<H>  05-30    PT/INR - ( 30 May 2023 05:30 )   PT: 14.6 sec;   INR: 1.22          PTT - ( 30 May 2023 05:30 )  PTT:37.5 sec    CAPILLARY BLOOD GLUCOSE      POCT Blood Glucose.: 196 mg/dL (30 May 2023 09:10)      RADIOLOGY & ADDITIONAL TESTS: Reviewed.   CC: Patient is a 43y old  Female who presents with a chief complaint of Sepsis, UTI (30 May 2023 07:34)      INTERVAL EVENTS: FAUSTINO    SUBJECTIVE / INTERVAL HPI: Patient seen and examined at bedside. Reports abdominal/suprapubic pain in LLQ and along R midback. Patient states that she feels as though she strained the muscles when working with PT. Reports intermittent chest discomfort for months.     ROS: negative unless otherwise stated above.    VITAL SIGNS:  Vital Signs Last 24 Hrs  T(C): 36.5 (29 May 2023 20:12), Max: 36.5 (29 May 2023 20:12)  T(F): 97.7 (29 May 2023 20:12), Max: 97.7 (29 May 2023 20:12)  HR: 100 (29 May 2023 20:12) (100 - 100)  BP: 119/71 (29 May 2023 20:12) (119/71 - 119/71)  BP(mean): --  RR: 16 (29 May 2023 20:12) (16 - 16)  SpO2: 97% (29 May 2023 20:12) (97% - 97%)    Parameters below as of 29 May 2023 20:12  Patient On (Oxygen Delivery Method): room air            PHYSICAL EXAM:    General: NAD  HEENT: MMM  Neck: supple  Cardiovascular: +S1/S2; RRR  Respiratory: CTA B/L; no W/R/R  Gastrointestinal: soft, NT/ND  Extremities: WWP; no edema, clubbing or cyanosis  Vascular: 2+ radial, DP/PT pulses B/L  Neurological: AAOx3; no focal deficits    MEDICATIONS:  MEDICATIONS  (STANDING):  acetaminophen     Tablet .. 975 milliGRAM(s) Oral every 8 hours  albuterol    90 MICROgram(s) HFA Inhaler 1 Puff(s) Inhalation daily  apixaban 5 milliGRAM(s) Oral every 12 hours  dextrose 5%. 1000 milliLiter(s) (50 mL/Hr) IV Continuous <Continuous>  dextrose 5%. 1000 milliLiter(s) (100 mL/Hr) IV Continuous <Continuous>  dextrose 50% Injectable 25 Gram(s) IV Push once  dextrose 50% Injectable 12.5 Gram(s) IV Push once  dextrose 50% Injectable 25 Gram(s) IV Push once  ertapenem  IVPB 1000 milliGRAM(s) IV Intermittent every 24 hours  gentamicin   IVPB 400 milliGRAM(s) IV Intermittent every 24 hours  glucagon  Injectable 1 milliGRAM(s) IntraMuscular once  ibuprofen  Tablet. 400 milliGRAM(s) Oral every 6 hours  insulin glargine Injectable (LANTUS) 70 Unit(s) SubCutaneous two times a day  insulin lispro (ADMELOG) corrective regimen sliding scale   SubCutaneous Before meals and at bedtime  insulin lispro Injectable (ADMELOG) 10 Unit(s) SubCutaneous three times a day before meals  insulin lispro Injectable (ADMELOG) 10 Unit(s) SubCutaneous at bedtime  labetalol 100 milliGRAM(s) Oral two times a day    MEDICATIONS  (PRN):  dextrose Oral Gel 15 Gram(s) Oral once PRN Blood Glucose LESS THAN 70 milliGRAM(s)/deciliter  HYDROmorphone  Injectable 0.5 milliGRAM(s) IV Push every 3 hours PRN breakthrough pain after trying oxycodone  oxybutynin 5 milliGRAM(s) Oral every 8 hours PRN Bladder spasms  oxyCODONE    IR 5 milliGRAM(s) Oral every 6 hours PRN Moderate Pain (4 - 6)  oxyCODONE    IR 10 milliGRAM(s) Oral every 4 hours PRN Severe Pain (7 - 10)      ALLERGIES:  Allergies    shellfish. (Hives; Anaphylaxis)  vancomycin (Anaphylaxis; Hives)  penicillin (Hives)  clindamycin (Pruritus)  amoxicillin (Unknown)  iodine containing compounds (Hives; Anaphylaxis)  fish (Hives; Urticaria)    Intolerances    Bactrim I.V. (Rash (Mod to Severe))      LABS:                        11.4   9.06  )-----------( 371      ( 30 May 2023 05:30 )             38.3     05-30    132<L>  |  100  |  14  ----------------------------<  191<H>  4.6   |  23  |  0.73    Ca    8.2<L>      30 May 2023 05:30  Phos  3.1     05-30  Mg     1.7     05-30    TPro  6.7  /  Alb  3.3  /  TBili  0.2  /  DBili  x   /  AST  15  /  ALT  11  /  AlkPhos  121<H>  05-30    PT/INR - ( 30 May 2023 05:30 )   PT: 14.6 sec;   INR: 1.22          PTT - ( 30 May 2023 05:30 )  PTT:37.5 sec    CAPILLARY BLOOD GLUCOSE      POCT Blood Glucose.: 196 mg/dL (30 May 2023 09:10)      RADIOLOGY & ADDITIONAL TESTS: Reviewed.   CC: Patient is a 43y old  Female who presents with a chief complaint of Sepsis, UTI (30 May 2023 07:34)      INTERVAL EVENTS: FAUSTINO    SUBJECTIVE / INTERVAL HPI: Patient seen and examined at bedside. Reports abdominal/suprapubic pain in LLQ and along R midback. Patient states that she feels as though she strained the muscles when working with PT. Reports intermittent chest discomfort for months. Denies fever, chills, nausea, vomiting, headache. Notes some crusting of her umbilicus and states that when she is in pain she notes purulent drainage, denies pain at site when manipulating.     ROS: negative unless otherwise stated above.    VITAL SIGNS:  Vital Signs Last 24 Hrs  T(C): 36.5 (29 May 2023 20:12), Max: 36.5 (29 May 2023 20:12)  T(F): 97.7 (29 May 2023 20:12), Max: 97.7 (29 May 2023 20:12)  HR: 100 (29 May 2023 20:12) (100 - 100)  BP: 119/71 (29 May 2023 20:12) (119/71 - 119/71)  BP(mean): --  RR: 16 (29 May 2023 20:12) (16 - 16)  SpO2: 97% (29 May 2023 20:12) (97% - 97%)    Parameters below as of 29 May 2023 20:12  Patient On (Oxygen Delivery Method): room air            PHYSICAL EXAM:    General: NAD, resting in bed  HEENT: MMM  Neck: supple  Cardiovascular: +S1/S2; RRR  Respiratory: CTA B/L; no W/R/R  Gastrointestinal: soft, ND, +BS, ostomy bag in place, clean well-appearing surrounding skin, no output in bag, hemorrhagic crust at umbilicus, non tender to manipulation  Back: tenderness to palpation of R paraspinal region of midback  : holley bag with clear yellow urine  Extremities: WWP; no edema, clubbing or cyanosis; diffusely tender/sensitive to palpation  Vascular: 2+ radial, DP/PT pulses B/L  Neurological: AAOx3; no focal deficits    MEDICATIONS:  MEDICATIONS  (STANDING):  acetaminophen     Tablet .. 975 milliGRAM(s) Oral every 8 hours  albuterol    90 MICROgram(s) HFA Inhaler 1 Puff(s) Inhalation daily  apixaban 5 milliGRAM(s) Oral every 12 hours  dextrose 5%. 1000 milliLiter(s) (50 mL/Hr) IV Continuous <Continuous>  dextrose 5%. 1000 milliLiter(s) (100 mL/Hr) IV Continuous <Continuous>  dextrose 50% Injectable 25 Gram(s) IV Push once  dextrose 50% Injectable 12.5 Gram(s) IV Push once  dextrose 50% Injectable 25 Gram(s) IV Push once  ertapenem  IVPB 1000 milliGRAM(s) IV Intermittent every 24 hours  gentamicin   IVPB 400 milliGRAM(s) IV Intermittent every 24 hours  glucagon  Injectable 1 milliGRAM(s) IntraMuscular once  ibuprofen  Tablet. 400 milliGRAM(s) Oral every 6 hours  insulin glargine Injectable (LANTUS) 70 Unit(s) SubCutaneous two times a day  insulin lispro (ADMELOG) corrective regimen sliding scale   SubCutaneous Before meals and at bedtime  insulin lispro Injectable (ADMELOG) 10 Unit(s) SubCutaneous three times a day before meals  insulin lispro Injectable (ADMELOG) 10 Unit(s) SubCutaneous at bedtime  labetalol 100 milliGRAM(s) Oral two times a day    MEDICATIONS  (PRN):  dextrose Oral Gel 15 Gram(s) Oral once PRN Blood Glucose LESS THAN 70 milliGRAM(s)/deciliter  HYDROmorphone  Injectable 0.5 milliGRAM(s) IV Push every 3 hours PRN breakthrough pain after trying oxycodone  oxybutynin 5 milliGRAM(s) Oral every 8 hours PRN Bladder spasms  oxyCODONE    IR 5 milliGRAM(s) Oral every 6 hours PRN Moderate Pain (4 - 6)  oxyCODONE    IR 10 milliGRAM(s) Oral every 4 hours PRN Severe Pain (7 - 10)      ALLERGIES:  Allergies    shellfish. (Hives; Anaphylaxis)  vancomycin (Anaphylaxis; Hives)  penicillin (Hives)  clindamycin (Pruritus)  amoxicillin (Unknown)  iodine containing compounds (Hives; Anaphylaxis)  fish (Hives; Urticaria)    Intolerances    Bactrim I.V. (Rash (Mod to Severe))      LABS:                        11.4   9.06  )-----------( 371      ( 30 May 2023 05:30 )             38.3     05-30    132<L>  |  100  |  14  ----------------------------<  191<H>  4.6   |  23  |  0.73    Ca    8.2<L>      30 May 2023 05:30  Phos  3.1     05-30  Mg     1.7     05-30    TPro  6.7  /  Alb  3.3  /  TBili  0.2  /  DBili  x   /  AST  15  /  ALT  11  /  AlkPhos  121<H>  05-30    PT/INR - ( 30 May 2023 05:30 )   PT: 14.6 sec;   INR: 1.22          PTT - ( 30 May 2023 05:30 )  PTT:37.5 sec    CAPILLARY BLOOD GLUCOSE      POCT Blood Glucose.: 196 mg/dL (30 May 2023 09:10)      RADIOLOGY & ADDITIONAL TESTS: Reviewed.

## 2023-05-30 NOTE — PROGRESS NOTE ADULT - PROBLEM SELECTOR PLAN 7
c/w adriana History of bilateral pulmonary emboli in segmental and subsegmental branches on the R and subsegmental branches on the L, diagnosed on CT chest during ED visit on 4/7/23. Home meds: eliquis 5mg BID  - c/w eliquis 5mg BID

## 2023-05-30 NOTE — PROCEDURE NOTE - NSPROCDETAILS_GEN_ALL_CORE
location identified, draped/prepped, sterile technique used/sterile dressing applied/supine position/ultrasound guidance
location identified, draped/prepped, sterile technique used/blood seen on insertion/dressing applied/flushes easily/secured in place/sterile technique, catheter placed

## 2023-05-30 NOTE — PROGRESS NOTE ADULT - PROBLEM SELECTOR PLAN 6
Hx of HTN. Home medications include Labetalol 100 mg BID and Losartan 50mg Q24.     Plan:  - c/w labetalol 100mg BID  restart losartan if further BP control needed Hx of HTN. Home medications include Labetalol 100 mg BID and Losartan 50mg Q24.     Plan:  - c/w labetalol 100mg BID  - restart losartan if further BP control needed

## 2023-05-30 NOTE — PROCEDURE NOTE - NSPOSTCAREGUIDE_GEN_A_CORE
Verbal/written post procedure instructions were given to patient/caregiver/Instructed patient/caregiver regarding signs and symptoms of infection/Care for catheter as per unit/ICU protocols
Care for catheter as per unit/ICU protocols

## 2023-05-30 NOTE — PROGRESS NOTE ADULT - SUBJECTIVE AND OBJECTIVE BOX
UROLOGY PROGRESS NOTE    SUBJECTIVE: Patient seen and examined bedside. MOLLY MELGAR. Still endorses significant suprapubic pain 2/2 SPT placement. States pain is severe and intermittent.    apixaban 5 milliGRAM(s) Oral every 12 hours  ertapenem  IVPB 1000 milliGRAM(s) IV Intermittent every 24 hours  gentamicin   IVPB 400 milliGRAM(s) IV Intermittent every 24 hours  labetalol 100 milliGRAM(s) Oral two times a day      Vital Signs Last 24 Hrs  T(C): 36.5 (29 May 2023 20:12), Max: 36.5 (29 May 2023 20:12)  T(F): 97.7 (29 May 2023 20:12), Max: 97.7 (29 May 2023 20:12)  HR: 100 (29 May 2023 20:12) (91 - 100)  BP: 119/71 (29 May 2023 20:12) (114/73 - 119/71)  BP(mean): --  RR: 16 (29 May 2023 20:12) (16 - 18)  SpO2: 97% (29 May 2023 20:12) (95% - 97%)    Parameters below as of 29 May 2023 20:12  Patient On (Oxygen Delivery Method): room air      I&O's Detail      PHYSICAL EXAM    General: NAD, resting comfortably in bed  C/V: NSR  Pulm: Nonlabored breathing, no respiratory distress on room air  Abd: soft, ND, appropriate incisional tenderness, no rebound tenderness, no guarding  Extrem: WWP, no edema, SCDs in place        LABS:                        11.4   9.06  )-----------( 371      ( 30 May 2023 05:30 )             38.3     05-30    132<L>  |  100  |  14  ----------------------------<  191<H>  4.6   |  23  |  0.73    Ca    8.2<L>      30 May 2023 05:30  Phos  3.1     05-30  Mg     1.7     05-30    TPro  6.7  /  Alb  3.3  /  TBili  0.2  /  DBili  x   /  AST  15  /  ALT  11  /  AlkPhos  121<H>  05-30    PT/INR - ( 30 May 2023 05:30 )   PT: 14.6 sec;   INR: 1.22          PTT - ( 30 May 2023 05:30 )  PTT:37.5 sec      RADIOLOGY & ADDITIONAL STUDIES:

## 2023-05-31 LAB
ALBUMIN SERPL ELPH-MCNC: 3.2 G/DL — LOW (ref 3.3–5)
ALP SERPL-CCNC: 137 U/L — HIGH (ref 40–120)
ALT FLD-CCNC: 10 U/L — SIGNIFICANT CHANGE UP (ref 10–45)
ANION GAP SERPL CALC-SCNC: 11 MMOL/L — SIGNIFICANT CHANGE UP (ref 5–17)
AST SERPL-CCNC: 13 U/L — SIGNIFICANT CHANGE UP (ref 10–40)
BASOPHILS # BLD AUTO: 0.02 K/UL — SIGNIFICANT CHANGE UP (ref 0–0.2)
BASOPHILS NFR BLD AUTO: 0.3 % — SIGNIFICANT CHANGE UP (ref 0–2)
BILIRUB SERPL-MCNC: <0.2 MG/DL — SIGNIFICANT CHANGE UP (ref 0.2–1.2)
BUN SERPL-MCNC: 12 MG/DL — SIGNIFICANT CHANGE UP (ref 7–23)
C PEPTIDE SERPL-MCNC: 9.7 NG/ML — HIGH (ref 1.1–4.4)
CALCIUM SERPL-MCNC: 8.2 MG/DL — LOW (ref 8.4–10.5)
CHLORIDE SERPL-SCNC: 99 MMOL/L — SIGNIFICANT CHANGE UP (ref 96–108)
CO2 SERPL-SCNC: 21 MMOL/L — LOW (ref 22–31)
CREAT SERPL-MCNC: 0.64 MG/DL — SIGNIFICANT CHANGE UP (ref 0.5–1.3)
EGFR: 112 ML/MIN/1.73M2 — SIGNIFICANT CHANGE UP
EOSINOPHIL # BLD AUTO: 0.2 K/UL — SIGNIFICANT CHANGE UP (ref 0–0.5)
EOSINOPHIL NFR BLD AUTO: 2.7 % — SIGNIFICANT CHANGE UP (ref 0–6)
GLUCOSE BLDC GLUCOMTR-MCNC: 105 MG/DL — HIGH (ref 70–99)
GLUCOSE BLDC GLUCOMTR-MCNC: 162 MG/DL — HIGH (ref 70–99)
GLUCOSE BLDC GLUCOMTR-MCNC: 189 MG/DL — HIGH (ref 70–99)
GLUCOSE BLDC GLUCOMTR-MCNC: 280 MG/DL — HIGH (ref 70–99)
GLUCOSE BLDC GLUCOMTR-MCNC: 82 MG/DL — SIGNIFICANT CHANGE UP (ref 70–99)
GLUCOSE SERPL-MCNC: 326 MG/DL — HIGH (ref 70–99)
HCT VFR BLD CALC: 36.5 % — SIGNIFICANT CHANGE UP (ref 34.5–45)
HGB BLD-MCNC: 10.9 G/DL — LOW (ref 11.5–15.5)
IMM GRANULOCYTES NFR BLD AUTO: 0.8 % — SIGNIFICANT CHANGE UP (ref 0–0.9)
LYMPHOCYTES # BLD AUTO: 2.18 K/UL — SIGNIFICANT CHANGE UP (ref 1–3.3)
LYMPHOCYTES # BLD AUTO: 28.9 % — SIGNIFICANT CHANGE UP (ref 13–44)
MAGNESIUM SERPL-MCNC: 1.7 MG/DL — SIGNIFICANT CHANGE UP (ref 1.6–2.6)
MCHC RBC-ENTMCNC: 23.9 PG — LOW (ref 27–34)
MCHC RBC-ENTMCNC: 29.9 GM/DL — LOW (ref 32–36)
MCV RBC AUTO: 80 FL — SIGNIFICANT CHANGE UP (ref 80–100)
MONOCYTES # BLD AUTO: 0.76 K/UL — SIGNIFICANT CHANGE UP (ref 0–0.9)
MONOCYTES NFR BLD AUTO: 10.1 % — SIGNIFICANT CHANGE UP (ref 2–14)
NEUTROPHILS # BLD AUTO: 4.32 K/UL — SIGNIFICANT CHANGE UP (ref 1.8–7.4)
NEUTROPHILS NFR BLD AUTO: 57.2 % — SIGNIFICANT CHANGE UP (ref 43–77)
NRBC # BLD: 0 /100 WBCS — SIGNIFICANT CHANGE UP (ref 0–0)
PHOSPHATE SERPL-MCNC: 3 MG/DL — SIGNIFICANT CHANGE UP (ref 2.5–4.5)
PLATELET # BLD AUTO: 364 K/UL — SIGNIFICANT CHANGE UP (ref 150–400)
POTASSIUM SERPL-MCNC: 4.3 MMOL/L — SIGNIFICANT CHANGE UP (ref 3.5–5.3)
POTASSIUM SERPL-SCNC: 4.3 MMOL/L — SIGNIFICANT CHANGE UP (ref 3.5–5.3)
PROT SERPL-MCNC: 6.5 G/DL — SIGNIFICANT CHANGE UP (ref 6–8.3)
RBC # BLD: 4.56 M/UL — SIGNIFICANT CHANGE UP (ref 3.8–5.2)
RBC # FLD: 16.1 % — HIGH (ref 10.3–14.5)
SODIUM SERPL-SCNC: 131 MMOL/L — LOW (ref 135–145)
WBC # BLD: 7.54 K/UL — SIGNIFICANT CHANGE UP (ref 3.8–10.5)
WBC # FLD AUTO: 7.54 K/UL — SIGNIFICANT CHANGE UP (ref 3.8–10.5)

## 2023-05-31 PROCEDURE — 99232 SBSQ HOSP IP/OBS MODERATE 35: CPT | Mod: GC

## 2023-05-31 RX ORDER — INSULIN LISPRO 100/ML
30 VIAL (ML) SUBCUTANEOUS
Refills: 0 | Status: DISCONTINUED | OUTPATIENT
Start: 2023-05-31 | End: 2023-06-01

## 2023-05-31 RX ORDER — INSULIN GLARGINE 100 [IU]/ML
80 INJECTION, SOLUTION SUBCUTANEOUS
Refills: 0 | Status: DISCONTINUED | OUTPATIENT
Start: 2023-05-31 | End: 2023-06-01

## 2023-05-31 RX ORDER — ONDANSETRON 8 MG/1
4 TABLET, FILM COATED ORAL ONCE
Refills: 0 | Status: COMPLETED | OUTPATIENT
Start: 2023-05-31 | End: 2023-05-31

## 2023-05-31 RX ADMIN — Medication 5 MILLIGRAM(S): at 13:49

## 2023-05-31 RX ADMIN — OXYCODONE HYDROCHLORIDE 10 MILLIGRAM(S): 5 TABLET ORAL at 22:08

## 2023-05-31 RX ADMIN — Medication 400 MILLIGRAM(S): at 06:04

## 2023-05-31 RX ADMIN — ERTAPENEM SODIUM 120 MILLIGRAM(S): 1 INJECTION, POWDER, LYOPHILIZED, FOR SOLUTION INTRAMUSCULAR; INTRAVENOUS at 16:53

## 2023-05-31 RX ADMIN — Medication 400 MILLIGRAM(S): at 18:35

## 2023-05-31 RX ADMIN — APIXABAN 5 MILLIGRAM(S): 2.5 TABLET, FILM COATED ORAL at 16:53

## 2023-05-31 RX ADMIN — OXYCODONE HYDROCHLORIDE 10 MILLIGRAM(S): 5 TABLET ORAL at 12:55

## 2023-05-31 RX ADMIN — Medication 6: at 09:27

## 2023-05-31 RX ADMIN — ONDANSETRON 4 MILLIGRAM(S): 8 TABLET, FILM COATED ORAL at 17:35

## 2023-05-31 RX ADMIN — Medication 2: at 22:09

## 2023-05-31 RX ADMIN — OXYCODONE HYDROCHLORIDE 10 MILLIGRAM(S): 5 TABLET ORAL at 07:30

## 2023-05-31 RX ADMIN — ALBUTEROL 1 PUFF(S): 90 AEROSOL, METERED ORAL at 09:30

## 2023-05-31 RX ADMIN — Medication 200 MILLIGRAM(S): at 17:51

## 2023-05-31 RX ADMIN — APIXABAN 5 MILLIGRAM(S): 2.5 TABLET, FILM COATED ORAL at 06:08

## 2023-05-31 RX ADMIN — OXYCODONE HYDROCHLORIDE 10 MILLIGRAM(S): 5 TABLET ORAL at 16:17

## 2023-05-31 RX ADMIN — OXYCODONE HYDROCHLORIDE 5 MILLIGRAM(S): 5 TABLET ORAL at 13:49

## 2023-05-31 RX ADMIN — Medication 20 UNIT(S): at 09:28

## 2023-05-31 RX ADMIN — OXYCODONE HYDROCHLORIDE 10 MILLIGRAM(S): 5 TABLET ORAL at 15:17

## 2023-05-31 RX ADMIN — INSULIN GLARGINE 70 UNIT(S): 100 INJECTION, SOLUTION SUBCUTANEOUS at 09:27

## 2023-05-31 RX ADMIN — INSULIN GLARGINE 80 UNIT(S): 100 INJECTION, SOLUTION SUBCUTANEOUS at 22:09

## 2023-05-31 RX ADMIN — Medication 100 MILLIGRAM(S): at 17:43

## 2023-05-31 RX ADMIN — Medication 400 MILLIGRAM(S): at 12:50

## 2023-05-31 RX ADMIN — Medication 100 MILLIGRAM(S): at 06:04

## 2023-05-31 RX ADMIN — Medication 400 MILLIGRAM(S): at 00:05

## 2023-05-31 RX ADMIN — Medication 400 MILLIGRAM(S): at 11:50

## 2023-05-31 RX ADMIN — OXYCODONE HYDROCHLORIDE 10 MILLIGRAM(S): 5 TABLET ORAL at 02:19

## 2023-05-31 RX ADMIN — Medication 2: at 12:55

## 2023-05-31 RX ADMIN — OXYCODONE HYDROCHLORIDE 10 MILLIGRAM(S): 5 TABLET ORAL at 06:04

## 2023-05-31 RX ADMIN — OXYCODONE HYDROCHLORIDE 10 MILLIGRAM(S): 5 TABLET ORAL at 11:55

## 2023-05-31 RX ADMIN — Medication 20 UNIT(S): at 12:55

## 2023-05-31 RX ADMIN — Medication 400 MILLIGRAM(S): at 07:30

## 2023-05-31 RX ADMIN — OXYCODONE HYDROCHLORIDE 5 MILLIGRAM(S): 5 TABLET ORAL at 14:49

## 2023-05-31 RX ADMIN — LIDOCAINE 1 PATCH: 4 CREAM TOPICAL at 01:11

## 2023-05-31 RX ADMIN — Medication 400 MILLIGRAM(S): at 17:35

## 2023-05-31 RX ADMIN — OXYCODONE HYDROCHLORIDE 10 MILLIGRAM(S): 5 TABLET ORAL at 23:00

## 2023-05-31 NOTE — PROGRESS NOTE ADULT - PROBLEM SELECTOR PLAN 9
F: s/p 2L NS in ED  E: Replete PRN  DVT ppx: Eliqius   GI ppx: None  Bowel: None  Dispo: RMF    PCP: Dr. Pelon Sánchez ([place] @ [facility])     FULL CODE F: PO  E: Replete PRN  DVT ppx: Eliqius   GI ppx: None  Bowel: None  Dispo: RMF    PCP: Dr. Pelon Sánchez ([place] @ [facility])     FULL CODE

## 2023-05-31 NOTE — PROGRESS NOTE ADULT - TIME BILLING
Insulin adjustment, discussion re: pain management at home
Insulin adjustment
treatment of UTI
treatment of complicated UTI
treatment of complicated UTI
Management of recurrent MDRO bacteruria/CAUTI.    Dr. Kauffman assumes care tomorrow.

## 2023-05-31 NOTE — PROGRESS NOTE ADULT - SUBJECTIVE AND OBJECTIVE BOX
INTERVAL HPI/OVERNIGHT EVENTS:  Patient was seen and examined at bedside. Case discussed with attending physician during morning rounds.     As per nurse and patient, no o/n events, patient resting comfortably. No complaints at this time. Patient denies: fever, chills, dizziness, weakness, HA, Changes in vision, CP, palpitations, SOB, cough, N/V/D/C, dysuria, changes in bowel movements, LE edema. ROS otherwise negative.    VITAL SIGNS:  T(F): 98.7 (05-31-23 @ 05:47)  HR: 86 (05-31-23 @ 05:47)  BP: 150/82 (05-31-23 @ 05:47)  RR: 19 (05-31-23 @ 05:47)  SpO2: 96% (05-31-23 @ 05:47)  Wt(kg): --    PHYSICAL EXAM:    Constitutional: No acute distress, resting comfortably in bed.    HEENT: Pupils equal round and reactive to light, EOMI, sclera non-icteric, neck supple, trachea midline, no masses, no JVD, MMM, good dentition  Respiratory: Clear to ausculatation bilaterally. good air entry, no wheezing, no rhonchi, no rales, without accessory muscle use and no intercostal retractions  Cardiovascular: Regular rate and rhythm, normal S1S2, no murmurs, rubs, gallops.  Gastrointestinal: soft, non-tender and non-distended, no masses palpable, Normoactive bowel sounds.  Extremities: Warm, well perfused, pulses equal bilateral upper and lower extremities, no edema, no clubbing  Neurological: AAOx3, CN Grossly intact  Skin: Normal temperature, warm, dry.    MEDICATIONS  (STANDING):  acetaminophen     Tablet .. 975 milliGRAM(s) Oral every 8 hours  albuterol    90 MICROgram(s) HFA Inhaler 1 Puff(s) Inhalation daily  apixaban 5 milliGRAM(s) Oral every 12 hours  chlorhexidine 2% Cloths 1 Application(s) Topical <User Schedule>  dextrose 5%. 1000 milliLiter(s) (50 mL/Hr) IV Continuous <Continuous>  dextrose 5%. 1000 milliLiter(s) (100 mL/Hr) IV Continuous <Continuous>  dextrose 50% Injectable 25 Gram(s) IV Push once  dextrose 50% Injectable 12.5 Gram(s) IV Push once  dextrose 50% Injectable 25 Gram(s) IV Push once  ertapenem  IVPB 1000 milliGRAM(s) IV Intermittent every 24 hours  gentamicin   IVPB 400 milliGRAM(s) IV Intermittent every 24 hours  glucagon  Injectable 1 milliGRAM(s) IntraMuscular once  ibuprofen  Tablet. 400 milliGRAM(s) Oral every 6 hours  insulin glargine Injectable (LANTUS) 70 Unit(s) SubCutaneous two times a day  insulin lispro (ADMELOG) corrective regimen sliding scale   SubCutaneous Before meals and at bedtime  insulin lispro Injectable (ADMELOG) 20 Unit(s) SubCutaneous three times a day before meals  labetalol 100 milliGRAM(s) Oral two times a day  lidocaine   4% Patch 1 Patch Transdermal every 24 hours    MEDICATIONS  (PRN):  dextrose Oral Gel 15 Gram(s) Oral once PRN Blood Glucose LESS THAN 70 milliGRAM(s)/deciliter  oxybutynin 5 milliGRAM(s) Oral every 8 hours PRN Bladder spasms  oxyCODONE    IR 5 milliGRAM(s) Oral every 6 hours PRN Moderate Pain (4 - 6)  oxyCODONE    IR 10 milliGRAM(s) Oral every 4 hours PRN Severe Pain (7 - 10)  sodium chloride 0.9% lock flush 10 milliLiter(s) IV Push every 1 hour PRN Pre/post blood products, medications, blood draw, and to maintain line patency      Allergies    shellfish. (Hives; Anaphylaxis)  vancomycin (Anaphylaxis; Hives)  penicillin (Hives)  clindamycin (Pruritus)  amoxicillin (Unknown)  iodine containing compounds (Hives; Anaphylaxis)  fish (Hives; Urticaria)    Intolerances    Bactrim I.V. (Rash (Mod to Severe))      LABS:                        10.9   7.54  )-----------( 364      ( 31 May 2023 08:13 )             36.5     05-31    131<L>  |  99  |  12  ----------------------------<  326<H>  4.3   |  21<L>  |  0.64    Ca    8.2<L>      31 May 2023 08:13  Phos  3.0     05-31  Mg     1.7     05-31    TPro  6.5  /  Alb  3.2<L>  /  TBili  <0.2  /  DBili  x   /  AST  13  /  ALT  10  /  AlkPhos  137<H>  05-31    PT/INR - ( 30 May 2023 05:30 )   PT: 14.6 sec;   INR: 1.22          PTT - ( 30 May 2023 05:30 )  PTT:37.5 sec        RADIOLOGY & ADDITIONAL TESTS:  Reviewed INTERVAL HPI/OVERNIGHT EVENTS:  Patient was seen and examined at bedside. Case discussed with attending physician during morning rounds.     As per nurse and patient, no o/n events. Patient reports abdominal pain to the lower abdomen, reports significant pain associated with PICC line the previous day, and is concerned for bleeding at umbilicus. Otherwise no fever, chills, chest pain, shortness of breath, normal ostomy output.     VITAL SIGNS:  T(F): 98.7 (05-31-23 @ 05:47)  HR: 86 (05-31-23 @ 05:47)  BP: 150/82 (05-31-23 @ 05:47)  RR: 19 (05-31-23 @ 05:47)  SpO2: 96% (05-31-23 @ 05:47)  Wt(kg): --    PHYSICAL EXAM:    Constitutional: No acute distress, resting comfortably in bed.    HEENT: Sclera non-icteric, neck supple, MMM.  Respiratory: Clear to auscultation bilaterally, adequate air entry, no wheezing, no rhonchi, no rales, without accessory muscle use and no intercostal retractions.  Cardiovascular: Regular rate and rhythm, normal S1S2, no murmurs, rubs, gallops.  Gastrointestinal: soft, non-tender and non-distended, Normoactive bowel sounds. Ostomy CDI draining normal appearing stool.  Refused suprapubic cath assessment but draining clear urine.   Extremities: Warm, well perfused, pulses equal bilateral upper and lower extremities.  Neurological: AAOx3.  Skin: Normal temperature, warm, dry.    MEDICATIONS  (STANDING):  acetaminophen     Tablet .. 975 milliGRAM(s) Oral every 8 hours  albuterol    90 MICROgram(s) HFA Inhaler 1 Puff(s) Inhalation daily  apixaban 5 milliGRAM(s) Oral every 12 hours  chlorhexidine 2% Cloths 1 Application(s) Topical <User Schedule>  dextrose 5%. 1000 milliLiter(s) (50 mL/Hr) IV Continuous <Continuous>  dextrose 5%. 1000 milliLiter(s) (100 mL/Hr) IV Continuous <Continuous>  dextrose 50% Injectable 25 Gram(s) IV Push once  dextrose 50% Injectable 12.5 Gram(s) IV Push once  dextrose 50% Injectable 25 Gram(s) IV Push once  ertapenem  IVPB 1000 milliGRAM(s) IV Intermittent every 24 hours  gentamicin   IVPB 400 milliGRAM(s) IV Intermittent every 24 hours  glucagon  Injectable 1 milliGRAM(s) IntraMuscular once  ibuprofen  Tablet. 400 milliGRAM(s) Oral every 6 hours  insulin glargine Injectable (LANTUS) 70 Unit(s) SubCutaneous two times a day  insulin lispro (ADMELOG) corrective regimen sliding scale   SubCutaneous Before meals and at bedtime  insulin lispro Injectable (ADMELOG) 20 Unit(s) SubCutaneous three times a day before meals  labetalol 100 milliGRAM(s) Oral two times a day  lidocaine   4% Patch 1 Patch Transdermal every 24 hours    MEDICATIONS  (PRN):  dextrose Oral Gel 15 Gram(s) Oral once PRN Blood Glucose LESS THAN 70 milliGRAM(s)/deciliter  oxybutynin 5 milliGRAM(s) Oral every 8 hours PRN Bladder spasms  oxyCODONE    IR 5 milliGRAM(s) Oral every 6 hours PRN Moderate Pain (4 - 6)  oxyCODONE    IR 10 milliGRAM(s) Oral every 4 hours PRN Severe Pain (7 - 10)  sodium chloride 0.9% lock flush 10 milliLiter(s) IV Push every 1 hour PRN Pre/post blood products, medications, blood draw, and to maintain line patency      Allergies    shellfish. (Hives; Anaphylaxis)  vancomycin (Anaphylaxis; Hives)  penicillin (Hives)  clindamycin (Pruritus)  amoxicillin (Unknown)  iodine containing compounds (Hives; Anaphylaxis)  fish (Hives; Urticaria)    Intolerances    Bactrim I.V. (Rash (Mod to Severe))      LABS:                        10.9   7.54  )-----------( 364      ( 31 May 2023 08:13 )             36.5     05-31    131<L>  |  99  |  12  ----------------------------<  326<H>  4.3   |  21<L>  |  0.64    Ca    8.2<L>      31 May 2023 08:13  Phos  3.0     05-31  Mg     1.7     05-31    TPro  6.5  /  Alb  3.2<L>  /  TBili  <0.2  /  DBili  x   /  AST  13  /  ALT  10  /  AlkPhos  137<H>  05-31    PT/INR - ( 30 May 2023 05:30 )   PT: 14.6 sec;   INR: 1.22          PTT - ( 30 May 2023 05:30 )  PTT:37.5 sec        RADIOLOGY & ADDITIONAL TESTS:  Reviewed

## 2023-05-31 NOTE — PROGRESS NOTE ADULT - REASON FOR ADMISSION
Sepsis, UTI

## 2023-05-31 NOTE — PROGRESS NOTE ADULT - PROBLEM SELECTOR PLAN 6
Hx of HTN. Home medications include Labetalol 100 mg BID and Losartan 50mg Q24.     Plan:  - c/w labetalol 100mg BID  - restart losartan if further BP control needed Hx of HTN. Home medications include Labetalol 100 mg BID and Losartan 50mg Q24.     Plan:  - c/w labetalol 100mg BID.  - restart losartan if further BP control needed

## 2023-05-31 NOTE — PROGRESS NOTE ADULT - PROBLEM/PLAN-5
DISPLAY PLAN FREE TEXT
yes
DISPLAY PLAN FREE TEXT

## 2023-05-31 NOTE — PROGRESS NOTE ADULT - ATTENDING COMMENTS
Pt seen on rounds this afternoon.  Was focused entirely on her persistent pain, and her feeling that it was not being adequately addressed.  Is now on 70 units Lantus BID, Premeal lispro now increased to 20 units TID  Glucoses have been fluctuating significantly, with most of the values still over 200 mg%, and the ones which fall into the mid-100s often occurring after she receives premeal insulin but then eats minimally.  She is still also complaining about drainage from the umbilicus  --With glucose still quite high overnight (280 this morning), increase the Lantus to 800 units BID  --Will also increase the premeal lispro further to 30 units TID, though it is unclear to what extent this is covering food intake (still often just the yogurt parfaits) vs providing the equivalent of basal insulin  --The pt does not appear to be on a maintenance regimen of long-acting opioids.  It is also unclear if she is on one at home, or whether it has been prescribed but the pt is taking the doses only prn rather than as a standing dose.  She appears to be a chronic pain pt, would ideally be on some regimen of long-acting opioid (oxycontin, MS-contin, etc) with short-acting "rescues."  I suggested that she ask the pain physicians about this

## 2023-05-31 NOTE — PROGRESS NOTE ADULT - ATTENDING COMMENTS
reports intermittent drainage from the umbilicus , dark colored drainage , no pain , non tender , no redness or erythema around the umbilicus , no recent laparoscopic surgery , last lap surgery was 2 years ago when she had an laproscopic ileostomy.     If persistent she will need surgery eval to r/p pilonidal sinus vs abscess vs retained foreign body   this oozing is not associated with menses to consider endometriosis as a potential eitology

## 2023-05-31 NOTE — PROGRESS NOTE ADULT - PROBLEM SELECTOR PLAN 3
Hx of DM. Last A1C 12.2% prior admission. Was discharged on May 3rd on Humulin U-500 100 units TID. Endocrinology consulted, titrating inpatient regimen daily.    Plan:  - Lantus 70u twice daily  - Lispro 10 units with meals and bedtime  - Lispro mISS with meals and at bedtime  - goal -180 mg/dL. Hx of DM. Last A1C 12.2% prior admission. Was discharged on May 3rd on Humulin U-500 100 units TID. Endocrinology consulted, titrating inpatient regimen daily.    Plan:  - Lantus 70u twice daily.  - Lispro 20 units with meals.  - Lispro mISS with meals and at bedtime.  - goal -180 mg/dL.

## 2023-05-31 NOTE — PROGRESS NOTE ADULT - PROBLEM SELECTOR PLAN 7
History of bilateral pulmonary emboli in segmental and subsegmental branches on the R and subsegmental branches on the L, diagnosed on CT chest during ED visit on 4/7/23. Home meds: eliquis 5mg BID  - c/w eliquis 5mg BID

## 2023-05-31 NOTE — PROGRESS NOTE ADULT - PROVIDER SPECIALTY LIST ADULT
Infectious Disease
Infectious Disease
Internal Medicine
Urology
Endocrinology
Internal Medicine
Internal Medicine
Urology
Endocrinology
Hospitalist
Infectious Disease
Internal Medicine
Infectious Disease
Urology
Internal Medicine

## 2023-05-31 NOTE — PROGRESS NOTE ADULT - PROBLEM SELECTOR PLAN 2
Admitted for severe sepsis 2/2 UTI. UA positive showing small LE, Positive nitrites, >10 WBC. Prior urine cx growing carbapenem-resistant and ESBL Klebsiella. s/p 2L NS, Gentamicin in ED. ID and urology following    Plan:  - c/w Ertapenem 1g IV daily - last day: 6/09/23  - c/w Gentamicin 400mg IV daily - last day: 06/05/23  - plan to place PICC today    #Suprapubic/Abd pain  - start oxybutynin 5mg TID PRN per urology for bladder spasms to trial  - pain regimen: Tylenol 975mg q8hrs, Ibuprofen 200mg q6hrs, Oxycodone IR PRN (moderate 5mg q6h, severe 10mg q4h)  - add lidocaine patch for R midback paraspinal tenderness Admitted for severe sepsis 2/2 UTI. UA positive showing small LE, Positive nitrites, >10 WBC. Prior urine cx growing carbapenem-resistant and ESBL Klebsiella. s/p 2L NS, Gentamicin in ED. ID and urology following    Plan:  - c/w Ertapenem 1g IV daily - last day: 6/09/23  - c/w Gentamicin 400mg IV daily - last day: 06/05/23  - plan to place PICC today    #Suprapubic/Abd pain  - Patient is refusing oxybutynin at this time but oxybutynin 5mg TID PRN  available per urology for bladder spasms to trial  - pain regimen: Tylenol 975mg q8hrs, Ibuprofen 200mg q6hrs, Oxycodone IR PRN (moderate 5mg q6h, severe 10mg q4h), lidocaine patch for R midback paraspinal tenderness

## 2023-05-31 NOTE — PROGRESS NOTE ADULT - PROBLEM SELECTOR PROBLEM 4
Right ovarian cyst

## 2023-05-31 NOTE — PROGRESS NOTE ADULT - SUBJECTIVE AND OBJECTIVE BOX
SUBJECTIVE / INTERVAL HPI: Patient was seen and examined this morning.     CAPILLARY BLOOD GLUCOSE & INSULIN RECEIVED  266 mg/dL (05-29 @ 22:06) ? 70 units of lantus + 10 units of lispro + 6 units of lispro sliding scale.   196 mg/dL (05-30 @ 09:10) ? 70 units of lantus + 10 units of lispro + 2 units of lispro sliding scale.  144 mg/dL (05-30 @ 13:05) ? 10 units of lispro.   212 mg/dL (05-30 @ 18:20) ? 10 units of lispro + 4 units of lispro sliding scale.   327 mg/dL (05-30 @ 22:19) ? 70 units of lantus + 8 units of lispro sliding scale.   280 mg/dL (05-31 @ 09:24) ? 70 units of lantus + 20 units of lispro + 6 units of lispro sliding scale.   162 mg/dL (05-31 @ 12:45) ? 20 units of lispro + 2 units of lispro sliding scale.     REVIEW OF SYSTEMS  Constitutional:  Negative fever, chills or loss of appetite.  Eyes:  Negative blurry vision or double vision.  Cardiovascular:  Negative for chest pain or palpitations.  Respiratory:  Negative for cough, wheezing, or shortness of breath.    Gastrointestinal:  Negative for nausea, vomiting, diarrhea, constipation, or abdominal pain.  Genitourinary:  Negative frequency, urgency or dysuria.  Neurologic:  No headache, confusion, dizziness, lightheadedness.    PHYSICAL EXAM  Vital Signs Last 24 Hrs  T(C): 37.1 (31 May 2023 12:13), Max: 37.1 (31 May 2023 05:47)  T(F): 98.7 (31 May 2023 12:13), Max: 98.7 (31 May 2023 05:47)  HR: 83 (31 May 2023 17:40) (83 - 96)  BP: 110/70 (31 May 2023 17:40) (110/70 - 152/84)  BP(mean): --  RR: 17 (31 May 2023 12:13) (17 - 19)  SpO2: 97% (31 May 2023 12:13) (96% - 97%)    Parameters below as of 31 May 2023 12:13  Patient On (Oxygen Delivery Method): room air    Constitutional: Awake, alert, in no acute distress.   HEENT: Normocephalic, atraumatic, NAOMI.  Respiratory: Lungs clear to ausculation bilaterally.   Cardiovascular: regular rhythm, normal S1 and S2, no audible murmurs.   GI: soft, non-tender, non-distended, bowel sounds present.  Extremities: No lower extremity edema.  Psychiatric: AAO x 3. Normal affect/mood.     LABS  CBC - WBC/HGB/HTC/PLT: 7.54/10.9/36.5/364 (05-31-23)  BMP - Na/K/Cl/Bicarb/BUN/Cr/Gluc/AG/eGFR: 131/4.3/99/21/12/0.64/326/11/112 (05-31-23)  Ca - 8.2 (05-31-23)  Phos - 3.0 (05-31-23)  Mg - 1.7 (05-31-23)  LFT - Alb/Tprot/Tbili/Dbili/AlkPhos/ALT/AST: 3.2/--/<0.2/--/137/10/13 (05-31-23)  PT/aPTT/INR: 14.6/37.5/1.22 (05-30-23)   Thyroid Stimulating Hormone, Serum: 0.851 (04-28-23)    MEDICATIONS  MEDICATIONS  (STANDING):  acetaminophen     Tablet .. 975 milliGRAM(s) Oral every 8 hours  albuterol    90 MICROgram(s) HFA Inhaler 1 Puff(s) Inhalation daily  apixaban 5 milliGRAM(s) Oral every 12 hours  chlorhexidine 2% Cloths 1 Application(s) Topical <User Schedule>  dextrose 5%. 1000 milliLiter(s) (50 mL/Hr) IV Continuous <Continuous>  dextrose 5%. 1000 milliLiter(s) (100 mL/Hr) IV Continuous <Continuous>  dextrose 50% Injectable 25 Gram(s) IV Push once  dextrose 50% Injectable 12.5 Gram(s) IV Push once  dextrose 50% Injectable 25 Gram(s) IV Push once  ertapenem  IVPB 1000 milliGRAM(s) IV Intermittent every 24 hours  gentamicin   IVPB 400 milliGRAM(s) IV Intermittent every 24 hours  glucagon  Injectable 1 milliGRAM(s) IntraMuscular once  ibuprofen  Tablet. 400 milliGRAM(s) Oral every 6 hours  insulin glargine Injectable (LANTUS) 80 Unit(s) SubCutaneous two times a day  insulin lispro (ADMELOG) corrective regimen sliding scale   SubCutaneous Before meals and at bedtime  insulin lispro Injectable (ADMELOG) 30 Unit(s) SubCutaneous three times a day before meals  labetalol 100 milliGRAM(s) Oral two times a day  lidocaine   4% Patch 1 Patch Transdermal every 24 hours    MEDICATIONS  (PRN):  dextrose Oral Gel 15 Gram(s) Oral once PRN Blood Glucose LESS THAN 70 milliGRAM(s)/deciliter  oxybutynin 5 milliGRAM(s) Oral every 8 hours PRN Bladder spasms  oxyCODONE    IR 10 milliGRAM(s) Oral every 4 hours PRN Severe Pain (7 - 10)  oxyCODONE    IR 5 milliGRAM(s) Oral every 6 hours PRN Moderate Pain (4 - 6)  sodium chloride 0.9% lock flush 10 milliLiter(s) IV Push every 1 hour PRN Pre/post blood products, medications, blood draw, and to maintain line patency    ASSESSMENT / RECOMMENDATIONS    A1C: 12.2 %  BUN: 12  Creatinine: 0.64  GFR: 112  Weight: 105.2  BMI: 36.3  EF:     # Type 2 diabetes mellitus with hyperglycemia  - Please continue lantus *** units at bedtime.   - Continue lispro *** units before each meal.  - Continue lispro moderate / low dose sliding scale before meals and at bedtime.  - Patient's fingerstick glucose goal is 100-180 mg/dL.    - Discharge recommendations to be discussed.   - Patient can follow up at discharge with [facility] by calling [phone] to make an appointment.      Case discussed with Dr. Bowden. Primary team updated.       Gerald Qiu    Endocrinology Fellow    Service Pager: [phone]    SUBJECTIVE / INTERVAL HPI: Patient was seen and examined this morning. She continued to complained of experiencing pelvic pain. Blood glucose levels have been above target despite her appetite has been decreased.     CAPILLARY BLOOD GLUCOSE & INSULIN RECEIVED  266 mg/dL (05-29 @ 22:06) ? 70 units of lantus + 10 units of lispro + 6 units of lispro sliding scale.   196 mg/dL (05-30 @ 09:10) ? 70 units of lantus + 10 units of lispro + 2 units of lispro sliding scale.  144 mg/dL (05-30 @ 13:05) ? 10 units of lispro.   212 mg/dL (05-30 @ 18:20) ? 10 units of lispro + 4 units of lispro sliding scale.   327 mg/dL (05-30 @ 22:19) ? 70 units of lantus + 8 units of lispro sliding scale.   280 mg/dL (05-31 @ 09:24) ? 70 units of lantus + 20 units of lispro + 6 units of lispro sliding scale.   162 mg/dL (05-31 @ 12:45) ? 20 units of lispro + 2 units of lispro sliding scale.     REVIEW OF SYSTEMS  Constitutional:  (+) loss of appetite. Negative fever, chills.   Cardiovascular:  Negative for chest pain or palpitations.  Respiratory:  Negative for cough, wheezing, or shortness of breath.    Gastrointestinal:  (+) Nausea. Negative for vomiting, diarrhea, constipation, or abdominal pain.  Genitourinary:  (+) Suprapubic pain.  Neurologic:  No headache, confusion, dizziness, lightheadedness.    PHYSICAL EXAM  Vital Signs Last 24 Hrs  T(C): 37.1 (31 May 2023 12:13), Max: 37.1 (31 May 2023 05:47)  T(F): 98.7 (31 May 2023 12:13), Max: 98.7 (31 May 2023 05:47)  HR: 83 (31 May 2023 17:40) (83 - 96)  BP: 110/70 (31 May 2023 17:40) (110/70 - 152/84)  BP(mean): --  RR: 17 (31 May 2023 12:13) (17 - 19)  SpO2: 97% (31 May 2023 12:13) (96% - 97%)    Parameters below as of 31 May 2023 12:13  Patient On (Oxygen Delivery Method): room air    Constitutional: Awake, alert, obese female, in no acute distress.   HEENT: Normocephalic, atraumatic, NAOMI.  Respiratory: Lungs clear to ausculation bilaterally.   Cardiovascular: regular rhythm, normal S1 and S2, no audible murmurs.   GI: soft, non-tender, non-distended, bowel sounds present. (+) Ostomy bag in place.   : (+) Suprapubic catheter.   Extremities: No lower extremity edema.  Psychiatric: AAO x 3. Normal affect/mood.     LABS  CBC - WBC/HGB/HTC/PLT: 7.54/10.9/36.5/364 (05-31-23)  BMP - Na/K/Cl/Bicarb/BUN/Cr/Gluc/AG/eGFR: 131/4.3/99/21/12/0.64/326/11/112 (05-31-23)  Ca - 8.2 (05-31-23)  Phos - 3.0 (05-31-23)  Mg - 1.7 (05-31-23)  LFT - Alb/Tprot/Tbili/Dbili/AlkPhos/ALT/AST: 3.2/--/<0.2/--/137/10/13 (05-31-23)  PT/aPTT/INR: 14.6/37.5/1.22 (05-30-23)   Thyroid Stimulating Hormone, Serum: 0.851 (04-28-23)    MEDICATIONS  MEDICATIONS  (STANDING):  acetaminophen     Tablet .. 975 milliGRAM(s) Oral every 8 hours  albuterol    90 MICROgram(s) HFA Inhaler 1 Puff(s) Inhalation daily  apixaban 5 milliGRAM(s) Oral every 12 hours  chlorhexidine 2% Cloths 1 Application(s) Topical <User Schedule>  dextrose 5%. 1000 milliLiter(s) (50 mL/Hr) IV Continuous <Continuous>  dextrose 5%. 1000 milliLiter(s) (100 mL/Hr) IV Continuous <Continuous>  dextrose 50% Injectable 25 Gram(s) IV Push once  dextrose 50% Injectable 12.5 Gram(s) IV Push once  dextrose 50% Injectable 25 Gram(s) IV Push once  ertapenem  IVPB 1000 milliGRAM(s) IV Intermittent every 24 hours  gentamicin   IVPB 400 milliGRAM(s) IV Intermittent every 24 hours  glucagon  Injectable 1 milliGRAM(s) IntraMuscular once  ibuprofen  Tablet. 400 milliGRAM(s) Oral every 6 hours  insulin glargine Injectable (LANTUS) 80 Unit(s) SubCutaneous two times a day  insulin lispro (ADMELOG) corrective regimen sliding scale   SubCutaneous Before meals and at bedtime  insulin lispro Injectable (ADMELOG) 30 Unit(s) SubCutaneous three times a day before meals  labetalol 100 milliGRAM(s) Oral two times a day  lidocaine   4% Patch 1 Patch Transdermal every 24 hours    MEDICATIONS  (PRN):  dextrose Oral Gel 15 Gram(s) Oral once PRN Blood Glucose LESS THAN 70 milliGRAM(s)/deciliter  oxybutynin 5 milliGRAM(s) Oral every 8 hours PRN Bladder spasms  oxyCODONE    IR 10 milliGRAM(s) Oral every 4 hours PRN Severe Pain (7 - 10)  oxyCODONE    IR 5 milliGRAM(s) Oral every 6 hours PRN Moderate Pain (4 - 6)  sodium chloride 0.9% lock flush 10 milliLiter(s) IV Push every 1 hour PRN Pre/post blood products, medications, blood draw, and to maintain line patency    ASSESSMENT / RECOMMENDATIONS  Ms. Katz is a 43 year-old woman with a history of pulmonary embolism, type 2 diabetes mellitus, abdominal necrotizing fasciitis status post suprapubic catheter and ostomy placement in November 2021, now admitted with sepsis due to urinary tract infection and catheter-site reaction. We were consulted for assistance in glycemic management.    A1C: 12.2 %  BUN: 12  Creatinine: 0.64  GFR: 112  Weight: 105.2  BMI: 36.3    # Type 2 diabetes mellitus with hyperglycemia  - Blood glucose levels have been above target.  - Please INCREASE lantus to 80 units twice daily   - INCREASE lispro to 30 units before each meal.  - Continue lispro moderate dose sliding scale before meals and at bedtime.  - Patient's fingerstick glucose goal is 100-180 mg/dL.    - Discharge recommendations to be discussed.   - Patient can follow up at discharge with Dr. Joseph at [facility] by calling [phone] to make an appointment.      Case discussed with Dr. Bowden. Primary team updated.       Gerald Qiu    Endocrinology Fellow    Service Pager: [phone]

## 2023-05-31 NOTE — PROGRESS NOTE ADULT - PROBLEM SELECTOR PROBLEM 1
Severe sepsis

## 2023-05-31 NOTE — PROGRESS NOTE ADULT - PROBLEM SELECTOR PLAN 1
Presented with worsening lower abd pain, drainage from suprapubic catheter site. On admission, met 2/4 SIRS (HR, WBC) with elevated lactate 5.3 (cleared after fluids to 1.7). CTAP largely unremarkable. On exam, current cath site with surrounding skin changes and old site open with visible underlying catheter.   - Infection source = UTI; UCx (5/22) growing carbapenem-resistant Klebsiella & E. Coli  - Blood cultures (5/23) NGTD   - S/p suprapubic catheter replacement by IR on 5/25 given complicated UTI/sepsis    Plan:  - c/w gentamicin 400mg q24h (weight based), AM level on 5/30 was 2.1  - c/w ertapenem 1000mg q24h   - Urology following, f/u recs   - ID following, f/u recs. Presented with worsening lower abd pain, drainage from suprapubic catheter site. On admission, met 2/4 SIRS (HR, WBC) with elevated lactate 5.3 (cleared after fluids to 1.7). CTAP largely unremarkable. On exam, current cath site with surrounding skin changes and old site open with visible underlying catheter. Infection source of UTI from suprapubic catheter; UCx (5/22) growing carbapenem-resistant Klebsiella & E. Coli. Blood cultures (5/23) NGTD. S/p suprapubic catheter replacement by IR on 5/25 given complicated UTI/sepsis.     Plan:  - c/w gentamicin 400mg q24h (weight based), AM random level on 5/30 was 2.1 - last day: 6/09/23  - c/w ertapenem 1000mg q24h - last day: 06/05/23  - Urology following, f/u recs   - ID following, f/u recs.

## 2023-05-31 NOTE — PROGRESS NOTE ADULT - PROBLEM SELECTOR PROBLEM 5
History of creation of ostomy

## 2023-05-31 NOTE — PROGRESS NOTE ADULT - PROBLEM SELECTOR PLAN 5
Hx of necrotizing fasciitis tx at [facility] in 11/2021 with hospital course c/b bowel resection and ostomy formation. On ROS, no increased output from ostomy bag and site appears clean/dry.    - Routine ostomy care  - Monitor ostomy output  - General surgery outpatient appointment for possible ostomy reversal
Hx of necrotizing fasciitis tx at [facility] in 11/2021 with hospital course c/b bowel resection and ostomy formation. On ROS, no increased output from ostomy bag and site appears clean/dry.    - Routine ostomy care  - Monitor ostomy output  - General surgery outpatient appointment for possible ostomy reversal
Hx of necrotizing fasciitis tx at [facility] in 11/2021 with hospital course c/b bowel resection and ostomy formation. On ROS, no increased output from ostomy bag and site appears clean/dry.    - Routine ostomy care  - Monitor ostomy output  - General surgery outpatient appointment for possible ostomy reversal
Hx of necrotizing fasciitis tx at [facility] in 11/2021 with hospital course c/b bowel resection and ostomy formation. On ROS, no increased output from ostomy bag and site appears clean/dry.    Plan:  - Routine ostomy care  - Monitor ostomy output  - General surgery outpatient appointment for possible ostomy reversal
Hx of necrotizing fasciitis tx at [facility] in 11/2021 with hospital course c/b bowel resection and ostomy formation. On ROS, no increased output from ostomy bag and site appears clean/dry.    Plan:  - Routine ostomy care  - Monitor ostomy output  - General surgery outpatient appointment for possible ostomy reversal
Hx of necrotizing fasciitis tx at [facility] in 11/2021 with hospital course c/b bowel resection and ostomy formation. On ROS, no increased output from ostomy bag and site appears clean/dry.    Plan:  - Routine ostomy care  - Monitor ostomy output  - General surgery outpatient appointment for possible ostomy reversal
Hx of necrotizing fasciitis tx at [facility] in 11/2021 with hospital course c/b bowel resection and ostomy formation. On ROS, no increased output from ostomy bag and site appears clean/dry.    - Routine ostomy care  - Monitor ostomy output  - General surgery outpatient appointment for possible ostomy reversal
Hx of necrotizing fasciitis tx at [facility] in 11/2021 with hospital course c/b bowel resection and ostomy formation. On ROS, no increased output from ostomy bag and site appears clean/dry.    - Routine ostomy care  - Monitor ostomy output  - General surgery outpatient appointment for possible ostomy reversal

## 2023-05-31 NOTE — PROGRESS NOTE ADULT - ASSESSMENT
44 yo F with PMHx asthma, hx PE (Eliquis), DM, HTN, HLD, hx abdominal necrotizing fasciitis (s/p suprapubic catheter, ostomy placement in 11/2021) who presented to [facility] ED from home with worsening abdominal and suprapubic pain and drainage from prior suprapubic catheter insertion site, admitted for sepsis 2/2 UTI and catheter-site infection on gentamicin and ertapenem. This is a 42 yo F with PMHx asthma, hx PE on home Eliquis, DM, HTN, HLD, hx abdominal necrotizing fasciitis s/p suprapubic catheter, ostomy placement in 11/2021 who presented to [facility] ED from home with worsening abdominal and suprapubic pain and drainage from prior suprapubic catheter insertion site, admitted for sepsis 2/2 UTI and catheter-site infection on gentamicin and ertapenem. Patient had her suprepubic catheter removed and replaced, currently draining normally. PICC line placed for complicated UTI and IV antibiotics for treatment.

## 2023-06-01 ENCOUNTER — TRANSCRIPTION ENCOUNTER (OUTPATIENT)
Age: 43
End: 2023-06-01

## 2023-06-01 VITALS
SYSTOLIC BLOOD PRESSURE: 106 MMHG | OXYGEN SATURATION: 97 % | DIASTOLIC BLOOD PRESSURE: 68 MMHG | HEART RATE: 90 BPM | TEMPERATURE: 99 F | RESPIRATION RATE: 17 BRPM

## 2023-06-01 LAB
GLUCOSE BLDC GLUCOMTR-MCNC: 254 MG/DL — HIGH (ref 70–99)
GLUCOSE BLDC GLUCOMTR-MCNC: 259 MG/DL — HIGH (ref 70–99)

## 2023-06-01 RX ORDER — ACETAMINOPHEN 500 MG
3 TABLET ORAL
Qty: 0 | Refills: 0 | DISCHARGE
Start: 2023-06-01

## 2023-06-01 RX ORDER — OXYCODONE HYDROCHLORIDE 5 MG/1
1 TABLET ORAL
Qty: 20 | Refills: 0
Start: 2023-06-01 | End: 2023-06-05

## 2023-06-01 RX ORDER — GENTAMICIN SULFATE 40 MG/ML
400 VIAL (ML) INJECTION ONCE
Refills: 0 | Status: COMPLETED | OUTPATIENT
Start: 2023-06-01 | End: 2023-06-01

## 2023-06-01 RX ORDER — OXYBUTYNIN CHLORIDE 5 MG
1 TABLET ORAL
Qty: 30 | Refills: 0
Start: 2023-06-01 | End: 2023-06-10

## 2023-06-01 RX ORDER — ERTAPENEM SODIUM 1 G/1
1000 INJECTION, POWDER, LYOPHILIZED, FOR SOLUTION INTRAMUSCULAR; INTRAVENOUS ONCE
Refills: 0 | Status: COMPLETED | OUTPATIENT
Start: 2023-06-01 | End: 2023-06-01

## 2023-06-01 RX ADMIN — ALBUTEROL 1 PUFF(S): 90 AEROSOL, METERED ORAL at 09:35

## 2023-06-01 RX ADMIN — Medication 200 MILLIGRAM(S): at 14:21

## 2023-06-01 RX ADMIN — OXYCODONE HYDROCHLORIDE 10 MILLIGRAM(S): 5 TABLET ORAL at 03:05

## 2023-06-01 RX ADMIN — OXYCODONE HYDROCHLORIDE 5 MILLIGRAM(S): 5 TABLET ORAL at 10:45

## 2023-06-01 RX ADMIN — Medication 30 UNIT(S): at 13:12

## 2023-06-01 RX ADMIN — Medication 100 MILLIGRAM(S): at 06:11

## 2023-06-01 RX ADMIN — OXYCODONE HYDROCHLORIDE 10 MILLIGRAM(S): 5 TABLET ORAL at 02:04

## 2023-06-01 RX ADMIN — INSULIN GLARGINE 80 UNIT(S): 100 INJECTION, SOLUTION SUBCUTANEOUS at 09:32

## 2023-06-01 RX ADMIN — OXYCODONE HYDROCHLORIDE 10 MILLIGRAM(S): 5 TABLET ORAL at 06:11

## 2023-06-01 RX ADMIN — Medication 400 MILLIGRAM(S): at 00:08

## 2023-06-01 RX ADMIN — APIXABAN 5 MILLIGRAM(S): 2.5 TABLET, FILM COATED ORAL at 06:11

## 2023-06-01 RX ADMIN — OXYCODONE HYDROCHLORIDE 5 MILLIGRAM(S): 5 TABLET ORAL at 09:45

## 2023-06-01 RX ADMIN — Medication 400 MILLIGRAM(S): at 06:11

## 2023-06-01 RX ADMIN — Medication 30 UNIT(S): at 09:33

## 2023-06-01 RX ADMIN — OXYCODONE HYDROCHLORIDE 10 MILLIGRAM(S): 5 TABLET ORAL at 07:37

## 2023-06-01 RX ADMIN — Medication 6: at 09:33

## 2023-06-01 RX ADMIN — OXYCODONE HYDROCHLORIDE 10 MILLIGRAM(S): 5 TABLET ORAL at 12:53

## 2023-06-01 RX ADMIN — Medication 6: at 13:12

## 2023-06-01 RX ADMIN — Medication 400 MILLIGRAM(S): at 01:00

## 2023-06-01 RX ADMIN — ERTAPENEM SODIUM 120 MILLIGRAM(S): 1 INJECTION, POWDER, LYOPHILIZED, FOR SOLUTION INTRAMUSCULAR; INTRAVENOUS at 13:44

## 2023-06-01 RX ADMIN — Medication 400 MILLIGRAM(S): at 07:37

## 2023-06-01 NOTE — DISCHARGE NOTE NURSING/CASE MANAGEMENT/SOCIAL WORK - NSDCFUADDAPPT_GEN_ALL_CORE_FT
Please call to make a follow up appointment with Pain management, Dr. Suh, to adress continued pain management through medication.       Please follow up with the endocrine team after discharge with [facility] by calling [phone] to make an appointment at your earliest convenience.

## 2023-06-01 NOTE — DISCHARGE NOTE NURSING/CASE MANAGEMENT/SOCIAL WORK - PATIENT PORTAL LINK FT
You can access the FollowMyHealth Patient Portal offered by [facility] by registering at the following website: [email or web address]. By joining [facility]’s FollowMyHealth portal, you will also be able to view your health information using other applications (apps) compatible with our system.

## 2023-06-01 NOTE — DISCHARGE NOTE NURSING/CASE MANAGEMENT/SOCIAL WORK - NSDCVIVACCINE_GEN_ALL_CORE_FT
Tdap; 25-Apr-2019 12:41; Neena Chavez (ARMANDO); [employer]; [id number] (Exp. Date: 02-Nov-2020); IntraMuscular; Deltoid Right.; 0.5 milliLiter(s); VIS (VIS Published: 09-May-2013, VIS Presented: 25-Apr-2019);

## 2023-06-01 NOTE — CHART NOTE - NSCHARTNOTEFT_GEN_A_CORE
The Drug Utilization Report below displays all of the controlled substance prescriptions, if any, that your patient has filled in the last twelve months. The information displayed on this report is compiled from pharmacy submissions to the Department, and accurately reflects the information as submitted by the pharmacies.    This report was requested by: Connie Kiser | Reference #: [id number]    Practitioner Count: 0  Pharmacy Count: 0  Current Opioid Prescriptions: 0  Current Benzodiazepine Prescriptions: 0  Current Stimulant Prescriptions: 0      Patient Demographic Information (PDI)       PDI	First Name	Last Name	Birth Date	Gender	Street Address	[facility]  A	Rachel Katz	1980	Female	[address] [facility]	[address]  B	Rachel Katz	1980	Female	[address] [place]	[address]    Prescription Information      PDI Filter:    PDI	My Rx	Current Rx	Drug Type	Rx Written	Rx Dispensed	Drug	Quantity	Days Supply	Prescriber Name	Prescriber ALEE #	Payment Method	Dispenser  A	N	N	O	07/15/2022	07/24/2022	oxycodone hcl (ir) 30 mg tab	90	30	Adrienne Cruz MD	[id number]	Insurance	[facility]  A	N	N	O	06/28/2022	07/15/2022	oxycodone hcl (ir) 30 mg tab	30	10	Adrienne Cruz MD	[id number]	Insurance	[facility]  A	N	N	O	06/28/2022	07/11/2022	oxycodone hcl (ir) 30 mg tab	9	3	Adrienne Cruz MD	[id number]	Other	[facility]  B	N	N	O	11/27/2022	12/01/2022	oxycodone hcl (ir) 10 mg tab	60	15	Amadou Soto MD	[id number]	Insurance	[facility]  B	N	N	O	11/14/2022	11/15/2022	oxycodone hcl (ir) 20 mg tab	120	30	Amadou Soto MD	[id number]	Insurance	[facility]  B	N	N	O	10/23/2022	11/01/2022	oxycodone hcl (ir) 20 mg tab	56	14	Laborde, Jean Claude FNP	[id number]	Insurance	[facility]  B	N	N	O	10/23/2022	10/23/2022	oxycodone hcl (ir) 10 mg tab	56	14	Laborde, Jean Claude FNP	[id number]	Cash	[facility]  B	N	N	O	10/16/2022	10/16/2022	oxycodone hcl (ir) 20 mg tab	60	30	Laborde, Jean Claude [facility]	[id number]	Insurance	[facility]  B	N	N	O	10/09/2022	10/09/2022	oxycodone hcl (ir) 10 mg tab	28	7	Pelon Leal MD	[id number]	Insurance	[facility]  B	N	N	O	10/02/2022	10/03/2022	oxycodone hcl (ir) 20 mg tab	28	7	Pelon Leal MD	[id number]	Insurance	[facility]  B	N	N	O	10/01/2022	10/01/2022	oxycodone hcl (ir) 10 mg tab	28	7	Pelon Leal MD	[id number]	Insurance	[facility]  B	N	N	O	09/15/2022	09/15/2022	oxycodone hcl (ir) 30 mg tab	60	15	Amadou Soto MD	[id number]	Insurance	[facility]    * - Details of Drug Type : O = Opioid, B = Benzodiazepine, S = Stimulant    * - Drugs marked with an asterisk are compound drugs. If the compound drug is made up of more than one controlled substance, then each controlled substance will be a separate row in the table.
11/27/2022	12/01/2022	oxycodone hcl (ir) 10 mg tab	60	15	Amadou Soto MD	[id number]	Insurance	[facility]  A	N	O	11/14/2022	11/15/2022	oxycodone hcl (ir) 20 mg tab	120	30	Amadou Soto MD	[id number]	Insurance	[facility]  A	N	O	10/23/2022	11/01/2022	oxycodone hcl (ir) 20 mg tab	56	14	Tani Gracia [facility]	[id number]	Insurance	[facility]  A	N	O	10/23/2022	10/23/2022	oxycodone hcl (ir) 10 mg tab	56	14	Radha, Jean Claude [facility]	[id number]	Cash	[facility]  A	N	O	10/16/2022	10/16/2022	oxycodone hcl (ir) 20 mg tab	60	30	Radha, Jean Claude [facility]	[id number]	Insurance	[facility]  A	N	O	10/09/2022	10/09/2022	oxycodone hcl (ir) 10 mg tab	28	7	Pelon Leal MD	[id number]	Insurance	[facility]  A	N	O	10/02/2022	10/03/2022	oxycodone hcl (ir) 20 mg tab	28	7	Pelon Leal MD	[id number]	Insurance	[facility]  A	N	O	10/01/2022	10/01/2022	oxycodone hcl (ir) 10 mg tab	28	7	Pelon Leal MD	[id number]	Insurance	[facility]  A	N	O	09/15/2022	09/15/2022	oxycodone hcl (ir) 30 mg tab	60	15	Amadou Soto MD	[id number]	Insurance	[facility]  B	N	O	07/15/2022	07/24/2022	oxycodone hcl (ir) 30 mg tab	90	30	Adrienne Cruz MD	[id number]	Insurance	[facility]  B	N	O	06/28/2022	07/15/2022	oxycodone hcl (ir) 30 mg tab	30	10	Adrienne Cruz MD	[id number]	Insurance	[facility]  B	N	O	06/28/2022	07/11/2022	oxycodone hcl (ir) 30 mg tab	9	3	Adrienne Cruz MD	[id number]	Other	[facility]
Ms. Katz is a 43 year-old woman with a history of pulmonary embolism, type 2 diabetes mellitus, abdominal necrotizing fasciitis status post suprapubic catheter and ostomy placement in November 2021, now admitted with sepsis due to urinary tract infection and catheter-site reaction. We were consulted for assistance in glycemic management.    CAPILLARY BLOOD GLUCOSE AND INSULIN RECEIVED  266 mg/dL (05-29 @ 22:06) ? 70 units of lantus + 10 units of lispro + 6 units of lispro sliding scale.   196 mg/dL (05-30 @ 09:10) ? 70 units of lantus + 10 units of lispro + 2 units of lispro sliding scale.  144 mg/dL (05-30 @ 13:05) ? 10 units of lispro.   212 mg/dL (05-30 @ 18:20) ? 10 units of lispro + 4 units of lispro sliding scale.   327 mg/dL (05-30 @ 22:19) ? 70 units of lantus + 8 units of lispro sliding scale.   280 mg/dL (05-31 @ 09:24) ? 70 units of lantus + 20 units of lispro + 6 units of lispro sliding scale.   162 mg/dL (05-31 @ 12:45) ? 20 units of lispro + 2 units of lispro sliding scale.   82 mg/dL (05-31 @ 15:35) ? Ø  105 mg/dL (05-31 @ 17:45) ? Ø (Comment: patient not eating, MD Arora aware)  189 mg/dL (05-31 @ 22:05) ? 80 units of lantus + 2 units of lispro sliding scale.   254 mg/dL (06-01 @ 09:26) ? 80 units of lantus + 30 units of lispro + 6 units of lispro sliding scale.     # Type 2 diabetes mellitus  - She will be discharge prior to rounds.   - Upon discharge, she may resume her home dose of Humulin U-500 100 units three times daily.  - Patient can follow up at discharge with Dr. Joseph at [facility] by calling [phone] to make an appointment.      Case discussed with Dr. Bowden. Primary team updated.    Gerald Qiu  Endocrinology Fellow

## 2023-06-07 DIAGNOSIS — A41.9 SEPSIS, UNSPECIFIED ORGANISM: ICD-10-CM

## 2023-06-07 DIAGNOSIS — Z79.4 LONG TERM (CURRENT) USE OF INSULIN: ICD-10-CM

## 2023-06-07 DIAGNOSIS — B96.1 KLEBSIELLA PNEUMONIAE [K. PNEUMONIAE] AS THE CAUSE OF DISEASES CLASSIFIED ELSEWHERE: ICD-10-CM

## 2023-06-07 DIAGNOSIS — E66.9 OBESITY, UNSPECIFIED: ICD-10-CM

## 2023-06-07 DIAGNOSIS — N39.0 URINARY TRACT INFECTION, SITE NOT SPECIFIED: ICD-10-CM

## 2023-06-07 DIAGNOSIS — L08.89 OTHER SPECIFIED LOCAL INFECTIONS OF THE SKIN AND SUBCUTANEOUS TISSUE: ICD-10-CM

## 2023-06-07 DIAGNOSIS — Z91.013 ALLERGY TO SEAFOOD: ICD-10-CM

## 2023-06-07 DIAGNOSIS — N83.201 UNSPECIFIED OVARIAN CYST, RIGHT SIDE: ICD-10-CM

## 2023-06-07 DIAGNOSIS — Z16.30 RESISTANCE TO UNSPECIFIED ANTIMICROBIAL DRUGS: ICD-10-CM

## 2023-06-07 DIAGNOSIS — J45.909 UNSPECIFIED ASTHMA, UNCOMPLICATED: ICD-10-CM

## 2023-06-07 DIAGNOSIS — B96.20 UNSPECIFIED ESCHERICHIA COLI [E. COLI] AS THE CAUSE OF DISEASES CLASSIFIED ELSEWHERE: ICD-10-CM

## 2023-06-07 DIAGNOSIS — E87.6 HYPOKALEMIA: ICD-10-CM

## 2023-06-07 DIAGNOSIS — E11.65 TYPE 2 DIABETES MELLITUS WITH HYPERGLYCEMIA: ICD-10-CM

## 2023-06-07 DIAGNOSIS — Z88.1 ALLERGY STATUS TO OTHER ANTIBIOTIC AGENTS STATUS: ICD-10-CM

## 2023-06-07 DIAGNOSIS — Z88.0 ALLERGY STATUS TO PENICILLIN: ICD-10-CM

## 2023-06-07 DIAGNOSIS — R65.20 SEVERE SEPSIS WITHOUT SEPTIC SHOCK: ICD-10-CM

## 2023-06-07 DIAGNOSIS — Z16.24 RESISTANCE TO MULTIPLE ANTIBIOTICS: ICD-10-CM

## 2023-06-07 DIAGNOSIS — T83.510A INFECTION AND INFLAMMATORY REACTION DUE TO CYSTOSTOMY CATHETER, INITIAL ENCOUNTER: ICD-10-CM

## 2023-06-07 DIAGNOSIS — Z91.041 RADIOGRAPHIC DYE ALLERGY STATUS: ICD-10-CM

## 2023-06-07 DIAGNOSIS — Z86.711 PERSONAL HISTORY OF PULMONARY EMBOLISM: ICD-10-CM

## 2023-06-07 DIAGNOSIS — I10 ESSENTIAL (PRIMARY) HYPERTENSION: ICD-10-CM

## 2023-06-07 DIAGNOSIS — Z93.3 COLOSTOMY STATUS: ICD-10-CM

## 2023-06-09 ENCOUNTER — NON-APPOINTMENT (OUTPATIENT)
Age: 43
End: 2023-06-09

## 2023-06-16 NOTE — PRE-OP CHECKLIST - AICD PRESENT
06/16/23 0911   Discharge Assessment   Assessment Type Discharge Planning Assessment   Confirmed/corrected address, phone number and insurance Yes   Confirmed Demographics Correct on Facesheet   Source of Information patient   People in Home spouse   Do you expect to return to your current living situation? No   Do you have help at home or someone to help you manage your care at home? No   Prior to hospitilization cognitive status: Alert/Oriented   Current cognitive status: Alert/Oriented   Equipment Currently Used at Home cane, straight   Readmission within 30 days? No   Patient currently being followed by outpatient case management? No   Do you currently have service(s) that help you manage your care at home? No   Do you take prescription medications? Yes   Do you have prescription coverage? Yes   Do you have any problems affording any of your prescribed medications? No   Is the patient taking medications as prescribed? yes   How do you get to doctors appointments? family or friend will provide   Are you on dialysis? No   Do you take coumadin? No   Discharge Plan A Home with family   DME Needed Upon Discharge  none   Discharge Plan discussed with: Patient   Transition of Care Barriers None   Physical Activity   On average, how many days per week do you engage in moderate to strenuous exercise (like a brisk walk)? 4 days   On average, how many minutes do you engage in exercise at this level? 20 min   Financial Resource Strain   How hard is it for you to pay for the very basics like food, housing, medical care, and heating? Not hard   Housing Stability   In the last 12 months, was there a time when you were not able to pay the mortgage or rent on time? N   In the last 12 months, how many places have you lived? 1   In the last 12 months, was there a time when you did not have a steady place to sleep or slept in a shelter (including now)? N   Transportation Needs   In the past 12 months, has lack of transportation 
kept you from medical appointments or from getting medications? no   In the past 12 months, has lack of transportation kept you from meetings, work, or from getting things needed for daily living? No   Food Insecurity   Within the past 12 months, you worried that your food would run out before you got the money to buy more. Never true   Within the past 12 months, the food you bought just didn't last and you didn't have money to get more. Never true   Stress   Do you feel stress - tense, restless, nervous, or anxious, or unable to sleep at night because your mind is troubled all the time - these days? Only a littl   Social Connections   In a typical week, how many times do you talk on the phone with family, friends, or neighbors? Three   How often do you get together with friends or relatives? Twice   How often do you attend Rastafari or Confucianist services? More than 4   Do you belong to any clubs or organizations such as Rastafari groups, unions, fraternal or athletic groups, or school groups? Yes   How often do you attend meetings of the clubs or organizations you belong to? 1 to 4   Alcohol Use   Q2: How many drinks containing alcohol do you have on a typical day when you are drinking? 1 or 2   Q3: How often do you have six or more drinks on one occasion? Never   OTHER   Name(s) of People in Home Wife- Ellie [phone]     PCP: Dr. Randy Evans   
no
no

## 2023-06-20 VITALS
DIASTOLIC BLOOD PRESSURE: 90 MMHG | RESPIRATION RATE: 18 BRPM | SYSTOLIC BLOOD PRESSURE: 135 MMHG | OXYGEN SATURATION: 96 % | HEIGHT: 67 IN | TEMPERATURE: 99 F | HEART RATE: 135 BPM | WEIGHT: 223.11 LBS

## 2023-06-20 LAB
ALBUMIN SERPL ELPH-MCNC: 4.3 G/DL — SIGNIFICANT CHANGE UP (ref 3.3–5)
ALP SERPL-CCNC: 187 U/L — HIGH (ref 40–120)
ALT FLD-CCNC: 12 U/L — SIGNIFICANT CHANGE UP (ref 10–45)
ANION GAP SERPL CALC-SCNC: 24 MMOL/L — HIGH (ref 5–17)
ANISOCYTOSIS BLD QL: SLIGHT — SIGNIFICANT CHANGE UP
APTT BLD: 31.6 SEC — SIGNIFICANT CHANGE UP (ref 27.5–35.5)
AST SERPL-CCNC: 18 U/L — SIGNIFICANT CHANGE UP (ref 10–40)
BASOPHILS # BLD AUTO: 0 K/UL — SIGNIFICANT CHANGE UP (ref 0–0.2)
BASOPHILS NFR BLD AUTO: 0 % — SIGNIFICANT CHANGE UP (ref 0–2)
BILIRUB SERPL-MCNC: 0.3 MG/DL — SIGNIFICANT CHANGE UP (ref 0.2–1.2)
BUN SERPL-MCNC: 26 MG/DL — HIGH (ref 7–23)
CALCIUM SERPL-MCNC: 10 MG/DL — SIGNIFICANT CHANGE UP (ref 8.4–10.5)
CALCIUM SERPL-MCNC: 9.4 MG/DL — SIGNIFICANT CHANGE UP (ref 8.4–10.5)
CHLORIDE SERPL-SCNC: 91 MMOL/L — LOW (ref 96–108)
CO2 SERPL-SCNC: 23 MMOL/L — SIGNIFICANT CHANGE UP (ref 22–31)
CO2 SERPL-SCNC: 26 MMOL/L — SIGNIFICANT CHANGE UP (ref 22–31)
CREAT SERPL-MCNC: 1.86 MG/DL — HIGH (ref 0.5–1.3)
EGFR: 34 ML/MIN/1.73M2 — LOW
EOSINOPHIL # BLD AUTO: 0 K/UL — SIGNIFICANT CHANGE UP (ref 0–0.5)
EOSINOPHIL NFR BLD AUTO: 0 % — SIGNIFICANT CHANGE UP (ref 0–6)
GLUCOSE SERPL-MCNC: 129 MG/DL — HIGH (ref 70–99)
GLUCOSE SERPL-MCNC: 194 MG/DL — HIGH (ref 70–99)
HCG SERPL-ACNC: <0 MIU/ML — SIGNIFICANT CHANGE UP
HCT VFR BLD CALC: 43.7 % — SIGNIFICANT CHANGE UP (ref 34.5–45)
HGB BLD-MCNC: 14 G/DL — SIGNIFICANT CHANGE UP (ref 11.5–15.5)
INR BLD: 1.17 — HIGH (ref 0.88–1.16)
LACTATE SERPL-SCNC: 7.3 MMOL/L — CRITICAL HIGH (ref 0.5–2)
LIDOCAIN IGE QN: 51 U/L — SIGNIFICANT CHANGE UP (ref 7–60)
LYMPHOCYTES # BLD AUTO: 20.9 % — SIGNIFICANT CHANGE UP (ref 13–44)
LYMPHOCYTES # BLD AUTO: 3.51 K/UL — HIGH (ref 1–3.3)
MAGNESIUM SERPL-MCNC: 1.7 MG/DL — SIGNIFICANT CHANGE UP (ref 1.6–2.6)
MANUAL SMEAR VERIFICATION: SIGNIFICANT CHANGE UP
MCHC RBC-ENTMCNC: 23.7 PG — LOW (ref 27–34)
MCHC RBC-ENTMCNC: 32 GM/DL — SIGNIFICANT CHANGE UP (ref 32–36)
MCV RBC AUTO: 73.9 FL — LOW (ref 80–100)
MICROCYTES BLD QL: SLIGHT — SIGNIFICANT CHANGE UP
MONOCYTES # BLD AUTO: 0.44 K/UL — SIGNIFICANT CHANGE UP (ref 0–0.9)
MONOCYTES NFR BLD AUTO: 2.6 % — SIGNIFICANT CHANGE UP (ref 2–14)
NEUTROPHILS # BLD AUTO: 12.71 K/UL — HIGH (ref 1.8–7.4)
NEUTROPHILS NFR BLD AUTO: 75.6 % — SIGNIFICANT CHANGE UP (ref 43–77)
PLAT MORPH BLD: ABNORMAL
PLATELET # BLD AUTO: 589 K/UL — HIGH (ref 150–400)
POTASSIUM SERPL-MCNC: 2.9 MMOL/L — CRITICAL LOW (ref 3.5–5.3)
POTASSIUM SERPL-SCNC: 2.9 MMOL/L — CRITICAL LOW (ref 3.5–5.3)
PROT SERPL-MCNC: 9.3 G/DL — HIGH (ref 6–8.3)
PROTHROM AB SERPL-ACNC: 13.9 SEC — HIGH (ref 10.5–13.4)
RBC # BLD: 5.91 M/UL — HIGH (ref 3.8–5.2)
RBC # FLD: 14.7 % — HIGH (ref 10.3–14.5)
RBC BLD AUTO: ABNORMAL
SODIUM SERPL-SCNC: 138 MMOL/L — SIGNIFICANT CHANGE UP (ref 135–145)
TROPONIN T, HIGH SENSITIVITY RESULT: 55 NG/L — CRITICAL HIGH (ref 0–51)
VARIANT LYMPHS # BLD: 0.9 % — SIGNIFICANT CHANGE UP (ref 0–6)
WBC # BLD: 16.81 K/UL — HIGH (ref 3.8–10.5)
WBC # FLD AUTO: 16.81 K/UL — HIGH (ref 3.8–10.5)

## 2023-06-20 PROCEDURE — 71045 X-RAY EXAM CHEST 1 VIEW: CPT | Mod: 26

## 2023-06-20 PROCEDURE — 99291 CRITICAL CARE FIRST HOUR: CPT

## 2023-06-20 RX ORDER — SODIUM CHLORIDE 9 MG/ML
1000 INJECTION INTRAMUSCULAR; INTRAVENOUS; SUBCUTANEOUS ONCE
Refills: 0 | Status: COMPLETED | OUTPATIENT
Start: 2023-06-20 | End: 2023-06-20

## 2023-06-20 RX ORDER — HYDROMORPHONE HYDROCHLORIDE 2 MG/ML
0.5 INJECTION INTRAMUSCULAR; INTRAVENOUS; SUBCUTANEOUS ONCE
Refills: 0 | Status: DISCONTINUED | OUTPATIENT
Start: 2023-06-20 | End: 2023-06-20

## 2023-06-20 RX ORDER — POTASSIUM CHLORIDE 20 MEQ
40 PACKET (EA) ORAL ONCE
Refills: 0 | Status: COMPLETED | OUTPATIENT
Start: 2023-06-20 | End: 2023-06-20

## 2023-06-20 RX ORDER — POTASSIUM CHLORIDE 20 MEQ
40 PACKET (EA) ORAL ONCE
Refills: 0 | Status: DISCONTINUED | OUTPATIENT
Start: 2023-06-20 | End: 2023-06-20

## 2023-06-20 RX ORDER — POTASSIUM CHLORIDE 20 MEQ
10 PACKET (EA) ORAL
Refills: 0 | Status: COMPLETED | OUTPATIENT
Start: 2023-06-20 | End: 2023-06-21

## 2023-06-20 RX ORDER — MAGNESIUM SULFATE 500 MG/ML
2 VIAL (ML) INJECTION ONCE
Refills: 0 | Status: COMPLETED | OUTPATIENT
Start: 2023-06-20 | End: 2023-06-21

## 2023-06-20 RX ORDER — ASPIRIN/CALCIUM CARB/MAGNESIUM 324 MG
325 TABLET ORAL ONCE
Refills: 0 | Status: COMPLETED | OUTPATIENT
Start: 2023-06-20 | End: 2023-06-20

## 2023-06-20 RX ADMIN — HYDROMORPHONE HYDROCHLORIDE 0.5 MILLIGRAM(S): 2 INJECTION INTRAMUSCULAR; INTRAVENOUS; SUBCUTANEOUS at 21:15

## 2023-06-20 RX ADMIN — Medication 40 MILLIGRAM(S): at 21:11

## 2023-06-20 RX ADMIN — SODIUM CHLORIDE 1000 MILLILITER(S): 9 INJECTION INTRAMUSCULAR; INTRAVENOUS; SUBCUTANEOUS at 23:36

## 2023-06-20 RX ADMIN — Medication 100 MILLIEQUIVALENT(S): at 22:18

## 2023-06-20 RX ADMIN — Medication 100 MILLIEQUIVALENT(S): at 23:34

## 2023-06-20 RX ADMIN — Medication 325 MILLIGRAM(S): at 22:16

## 2023-06-20 RX ADMIN — Medication 10 MILLIEQUIVALENT(S): at 23:35

## 2023-06-20 RX ADMIN — Medication 40 MILLIEQUIVALENT(S): at 22:32

## 2023-06-20 RX ADMIN — SODIUM CHLORIDE 1000 MILLILITER(S): 9 INJECTION INTRAMUSCULAR; INTRAVENOUS; SUBCUTANEOUS at 20:55

## 2023-06-20 RX ADMIN — HYDROMORPHONE HYDROCHLORIDE 0.5 MILLIGRAM(S): 2 INJECTION INTRAMUSCULAR; INTRAVENOUS; SUBCUTANEOUS at 20:56

## 2023-06-20 NOTE — ED ADULT TRIAGE NOTE - CHIEF COMPLAINT QUOTE
Pt is here for palpitations since yesterday. Denies any cp, sob, fever, or chills, n/v/, abd pain or  sx. EKG in prog. .

## 2023-06-20 NOTE — ED ADULT NURSE NOTE - OBJECTIVE STATEMENT
43 y.o female c/o palpitations x yesterday. Pt describes "chest tightness, pressure" accompanied by mild SOB. Pt denies n/v, dizziness, headache, numbness/tingling. Pt has suprapubic catheter and colostomy in place. Takes eliquis daily. placed on [facility].

## 2023-06-20 NOTE — ED PROVIDER NOTE - PROGRESS NOTE DETAILS
elevated trop, hypokalemia, elevated creatinine. concern for possible dehydration. elevated lactate. will give ivf and repeat lactate and creat. will replete potassium. will premedicate for possible CT chest lactate and creatinine improved with ivf chantell - no PE. CTAP w/o acute findings.   still w chest pain. discussed w cards. will admit for r/o.   already got ASA, no heparin per cards chantell / sera   received on sign out. pt w hx as above. here w chest pain.   work-up thus far as above.   at time of sign out pending CTA chest r/o PE and CTAP  plan for admission - cards for nstemi vs medicine (if PE)

## 2023-06-20 NOTE — ED PROVIDER NOTE - CLINICAL SUMMARY MEDICAL DECISION MAKING FREE TEXT BOX
left sided chest pain, pressurelike, FORBES, tachycardic, no hypoxia, speaking in full sentences, lungs clear on exam. with hx of PE however on eliquis, concern for possible PE, possible acs. afebrile, doubt PNA.   -cehck labs  -ekg  -cxr  -CT PE study  -cultures  -dilaudid  -ivf

## 2023-06-20 NOTE — ED PROVIDER NOTE - CARE PLAN
1 Principal Discharge DX:	Chest pain  Secondary Diagnosis:	Hypokalemia  Secondary Diagnosis:	LORA (acute kidney injury)

## 2023-06-20 NOTE — ED PROVIDER NOTE - OBJECTIVE STATEMENT
43F hx asthma, PE (on eliquis), htn, dm, high chol, abdominal nec fasc (suprapubic catheter, ostomy), c/o left sided chest pain. states ongoing since yesterday. states pressurelike to left chest and under left breast. feels short of breath with exertion. no fevers, no cough, no vomiting. no abd pain. no recent travel.

## 2023-06-21 ENCOUNTER — INPATIENT (INPATIENT)
Facility: HOSPITAL | Age: 43
LOS: 7 days | Discharge: HOME HEALTH | DRG: 246 | End: 2023-06-29
Attending: INTERNAL MEDICINE | Admitting: INTERNAL MEDICINE
Payer: MEDICAID

## 2023-06-21 DIAGNOSIS — Z98.89 OTHER SPECIFIED POSTPROCEDURAL STATES: Chronic | ICD-10-CM

## 2023-06-21 DIAGNOSIS — Z93.9 ARTIFICIAL OPENING STATUS, UNSPECIFIED: Chronic | ICD-10-CM

## 2023-06-21 DIAGNOSIS — Z86.73 PERSONAL HISTORY OF TRANSIENT ISCHEMIC ATTACK (TIA), AND CEREBRAL INFARCTION WITHOUT RESIDUAL DEFICITS: ICD-10-CM

## 2023-06-21 DIAGNOSIS — E78.5 HYPERLIPIDEMIA, UNSPECIFIED: ICD-10-CM

## 2023-06-21 DIAGNOSIS — Z98.890 OTHER SPECIFIED POSTPROCEDURAL STATES: Chronic | ICD-10-CM

## 2023-06-21 DIAGNOSIS — R10.9 UNSPECIFIED ABDOMINAL PAIN: ICD-10-CM

## 2023-06-21 DIAGNOSIS — A41.9 SEPSIS, UNSPECIFIED ORGANISM: ICD-10-CM

## 2023-06-21 DIAGNOSIS — D72.829 ELEVATED WHITE BLOOD CELL COUNT, UNSPECIFIED: ICD-10-CM

## 2023-06-21 DIAGNOSIS — N17.9 ACUTE KIDNEY FAILURE, UNSPECIFIED: ICD-10-CM

## 2023-06-21 DIAGNOSIS — I26.99 OTHER PULMONARY EMBOLISM WITHOUT ACUTE COR PULMONALE: ICD-10-CM

## 2023-06-21 DIAGNOSIS — E87.6 HYPOKALEMIA: ICD-10-CM

## 2023-06-21 DIAGNOSIS — Z98.890 OTHER SPECIFIED POSTPROCEDURAL STATES: ICD-10-CM

## 2023-06-21 DIAGNOSIS — E11.9 TYPE 2 DIABETES MELLITUS WITHOUT COMPLICATIONS: ICD-10-CM

## 2023-06-21 DIAGNOSIS — R65.10 SYSTEMIC INFLAMMATORY RESPONSE SYNDROME (SIRS) OF NON-INFECTIOUS ORIGIN WITHOUT ACUTE ORGAN DYSFUNCTION: ICD-10-CM

## 2023-06-21 DIAGNOSIS — Z93.9 ARTIFICIAL OPENING STATUS, UNSPECIFIED: ICD-10-CM

## 2023-06-21 DIAGNOSIS — I10 ESSENTIAL (PRIMARY) HYPERTENSION: ICD-10-CM

## 2023-06-21 DIAGNOSIS — R07.9 CHEST PAIN, UNSPECIFIED: ICD-10-CM

## 2023-06-21 LAB
A1C WITH ESTIMATED AVERAGE GLUCOSE RESULT: 11.9 % — HIGH (ref 4–5.6)
ALBUMIN SERPL ELPH-MCNC: 3.1 G/DL — LOW (ref 3.3–5)
ALBUMIN SERPL ELPH-MCNC: 3.3 G/DL — SIGNIFICANT CHANGE UP (ref 3.3–5)
ALBUMIN SERPL ELPH-MCNC: 3.4 G/DL — SIGNIFICANT CHANGE UP (ref 3.3–5)
ALP SERPL-CCNC: 122 U/L — HIGH (ref 40–120)
ALP SERPL-CCNC: 143 U/L — HIGH (ref 40–120)
ALP SERPL-CCNC: 159 U/L — HIGH (ref 40–120)
ALT FLD-CCNC: 7 U/L — LOW (ref 10–45)
ALT FLD-CCNC: 8 U/L — LOW (ref 10–45)
ALT FLD-CCNC: 9 U/L — LOW (ref 10–45)
ANION GAP SERPL CALC-SCNC: 12 MMOL/L — SIGNIFICANT CHANGE UP (ref 5–17)
ANION GAP SERPL CALC-SCNC: 15 MMOL/L — SIGNIFICANT CHANGE UP (ref 5–17)
ANION GAP SERPL CALC-SCNC: 15 MMOL/L — SIGNIFICANT CHANGE UP (ref 5–17)
ANION GAP SERPL CALC-SCNC: 17 MMOL/L — SIGNIFICANT CHANGE UP (ref 5–17)
ANION GAP SERPL CALC-SCNC: 18 MMOL/L — HIGH (ref 5–17)
APPEARANCE UR: CLEAR — SIGNIFICANT CHANGE UP
APTT BLD: 58.2 SEC — HIGH (ref 27.5–35.5)
AST SERPL-CCNC: 11 U/L — SIGNIFICANT CHANGE UP (ref 10–40)
AST SERPL-CCNC: 12 U/L — SIGNIFICANT CHANGE UP (ref 10–40)
AST SERPL-CCNC: 12 U/L — SIGNIFICANT CHANGE UP (ref 10–40)
B-OH-BUTYR SERPL-SCNC: 0.1 MMOL/L — SIGNIFICANT CHANGE UP
B-OH-BUTYR SERPL-SCNC: 0.5 MMOL/L — HIGH
BACTERIA # UR AUTO: PRESENT /HPF
BASOPHILS # BLD AUTO: 0 K/UL — SIGNIFICANT CHANGE UP (ref 0–0.2)
BASOPHILS NFR BLD AUTO: 0 % — SIGNIFICANT CHANGE UP (ref 0–2)
BILIRUB DIRECT SERPL-MCNC: 0.2 MG/DL — SIGNIFICANT CHANGE UP (ref 0–0.3)
BILIRUB INDIRECT FLD-MCNC: 0 MG/DL — LOW (ref 0.2–1)
BILIRUB SERPL-MCNC: 0.2 MG/DL — SIGNIFICANT CHANGE UP (ref 0.2–1.2)
BILIRUB SERPL-MCNC: <0.2 MG/DL — SIGNIFICANT CHANGE UP (ref 0.2–1.2)
BILIRUB SERPL-MCNC: <0.2 MG/DL — SIGNIFICANT CHANGE UP (ref 0.2–1.2)
BILIRUB UR-MCNC: NEGATIVE — SIGNIFICANT CHANGE UP
BUN SERPL-MCNC: 22 MG/DL — SIGNIFICANT CHANGE UP (ref 7–23)
BUN SERPL-MCNC: 24 MG/DL — HIGH (ref 7–23)
BUN SERPL-MCNC: 25 MG/DL — HIGH (ref 7–23)
BUN SERPL-MCNC: 27 MG/DL — HIGH (ref 7–23)
BUN SERPL-MCNC: 28 MG/DL — HIGH (ref 7–23)
CALCIUM SERPL-MCNC: 8 MG/DL — LOW (ref 8.4–10.5)
CALCIUM SERPL-MCNC: 8.2 MG/DL — LOW (ref 8.4–10.5)
CALCIUM SERPL-MCNC: 8.2 MG/DL — LOW (ref 8.4–10.5)
CALCIUM SERPL-MCNC: 8.3 MG/DL — LOW (ref 8.4–10.5)
CHLORIDE SERPL-SCNC: 103 MMOL/L — SIGNIFICANT CHANGE UP (ref 96–108)
CHLORIDE SERPL-SCNC: 104 MMOL/L — SIGNIFICANT CHANGE UP (ref 96–108)
CHLORIDE SERPL-SCNC: 93 MMOL/L — LOW (ref 96–108)
CHLORIDE SERPL-SCNC: 95 MMOL/L — LOW (ref 96–108)
CHLORIDE SERPL-SCNC: 98 MMOL/L — SIGNIFICANT CHANGE UP (ref 96–108)
CHOLEST SERPL-MCNC: 214 MG/DL — HIGH
CK MB CFR SERPL CALC: 3 NG/ML — SIGNIFICANT CHANGE UP (ref 0–6.7)
CK SERPL-CCNC: 38 U/L — SIGNIFICANT CHANGE UP (ref 25–170)
CO2 SERPL-SCNC: 17 MMOL/L — LOW (ref 22–31)
CO2 SERPL-SCNC: 18 MMOL/L — LOW (ref 22–31)
CO2 SERPL-SCNC: 22 MMOL/L — SIGNIFICANT CHANGE UP (ref 22–31)
CO2 SERPL-SCNC: 22 MMOL/L — SIGNIFICANT CHANGE UP (ref 22–31)
COLOR SPEC: YELLOW — SIGNIFICANT CHANGE UP
COMMENT - URINE: SIGNIFICANT CHANGE UP
CREAT SERPL-MCNC: 1.3 MG/DL — SIGNIFICANT CHANGE UP (ref 0.5–1.3)
CREAT SERPL-MCNC: 1.32 MG/DL — HIGH (ref 0.5–1.3)
CREAT SERPL-MCNC: 1.33 MG/DL — HIGH (ref 0.5–1.3)
CREAT SERPL-MCNC: 1.46 MG/DL — HIGH (ref 0.5–1.3)
CREAT SERPL-MCNC: 1.69 MG/DL — HIGH (ref 0.5–1.3)
CRP SERPL-MCNC: 32.5 MG/L — HIGH (ref 0–4)
DIFF PNL FLD: ABNORMAL
EGFR: 38 ML/MIN/1.73M2 — LOW
EGFR: 46 ML/MIN/1.73M2 — LOW
EGFR: 51 ML/MIN/1.73M2 — LOW
EGFR: 51 ML/MIN/1.73M2 — LOW
EGFR: 52 ML/MIN/1.73M2 — LOW
EOSINOPHIL # BLD AUTO: 0 K/UL — SIGNIFICANT CHANGE UP (ref 0–0.5)
EOSINOPHIL NFR BLD AUTO: 0 % — SIGNIFICANT CHANGE UP (ref 0–6)
EPI CELLS # UR: SIGNIFICANT CHANGE UP /HPF (ref 0–5)
ERYTHROCYTE [SEDIMENTATION RATE] IN BLOOD: 48 MM/HR — HIGH
ESTIMATED AVERAGE GLUCOSE: 295 MG/DL — HIGH (ref 68–114)
GLUCOSE BLDC GLUCOMTR-MCNC: 115 MG/DL — HIGH (ref 70–99)
GLUCOSE BLDC GLUCOMTR-MCNC: 127 MG/DL — HIGH (ref 70–99)
GLUCOSE BLDC GLUCOMTR-MCNC: 191 MG/DL — HIGH (ref 70–99)
GLUCOSE BLDC GLUCOMTR-MCNC: 227 MG/DL — HIGH (ref 70–99)
GLUCOSE BLDC GLUCOMTR-MCNC: 237 MG/DL — HIGH (ref 70–99)
GLUCOSE BLDC GLUCOMTR-MCNC: 329 MG/DL — HIGH (ref 70–99)
GLUCOSE BLDC GLUCOMTR-MCNC: 449 MG/DL — HIGH (ref 70–99)
GLUCOSE BLDC GLUCOMTR-MCNC: 54 MG/DL — CRITICAL LOW (ref 70–99)
GLUCOSE BLDC GLUCOMTR-MCNC: 547 MG/DL — CRITICAL HIGH (ref 70–99)
GLUCOSE BLDC GLUCOMTR-MCNC: 550 MG/DL — CRITICAL HIGH (ref 70–99)
GLUCOSE BLDC GLUCOMTR-MCNC: 552 MG/DL — CRITICAL HIGH (ref 70–99)
GLUCOSE BLDC GLUCOMTR-MCNC: 561 MG/DL — CRITICAL HIGH (ref 70–99)
GLUCOSE SERPL-MCNC: 111 MG/DL — HIGH (ref 70–99)
GLUCOSE SERPL-MCNC: 185 MG/DL — HIGH (ref 70–99)
GLUCOSE SERPL-MCNC: 363 MG/DL — HIGH (ref 70–99)
GLUCOSE SERPL-MCNC: 623 MG/DL — CRITICAL HIGH (ref 70–99)
GLUCOSE UR QL: >=1000
HCT VFR BLD CALC: 35.7 % — SIGNIFICANT CHANGE UP (ref 34.5–45)
HCT VFR BLD CALC: 35.9 % — SIGNIFICANT CHANGE UP (ref 34.5–45)
HDLC SERPL-MCNC: 34 MG/DL — LOW
HGB BLD-MCNC: 11.1 G/DL — LOW (ref 11.5–15.5)
HGB BLD-MCNC: 11.3 G/DL — LOW (ref 11.5–15.5)
IMM GRANULOCYTES NFR BLD AUTO: 0.3 % — SIGNIFICANT CHANGE UP (ref 0–0.9)
KETONES UR-MCNC: NEGATIVE — SIGNIFICANT CHANGE UP
LACTATE SERPL-SCNC: 2.8 MMOL/L — HIGH (ref 0.5–2)
LACTATE SERPL-SCNC: 3.5 MMOL/L — HIGH (ref 0.5–2)
LACTATE SERPL-SCNC: 3.9 MMOL/L — HIGH (ref 0.5–2)
LACTATE SERPL-SCNC: 4.2 MMOL/L — CRITICAL HIGH (ref 0.5–2)
LACTATE SERPL-SCNC: 7.4 MMOL/L — CRITICAL HIGH (ref 0.5–2)
LEUKOCYTE ESTERASE UR-ACNC: ABNORMAL
LIPID PNL WITH DIRECT LDL SERPL: 151 MG/DL — HIGH
LYMPHOCYTES # BLD AUTO: 1.04 K/UL — SIGNIFICANT CHANGE UP (ref 1–3.3)
LYMPHOCYTES # BLD AUTO: 11.7 % — LOW (ref 13–44)
MAGNESIUM SERPL-MCNC: 1.6 MG/DL — SIGNIFICANT CHANGE UP (ref 1.6–2.6)
MAGNESIUM SERPL-MCNC: 1.7 MG/DL — SIGNIFICANT CHANGE UP (ref 1.6–2.6)
MAGNESIUM SERPL-MCNC: 1.8 MG/DL — SIGNIFICANT CHANGE UP (ref 1.6–2.6)
MAGNESIUM SERPL-MCNC: 1.9 MG/DL — SIGNIFICANT CHANGE UP (ref 1.6–2.6)
MCHC RBC-ENTMCNC: 23.9 PG — LOW (ref 27–34)
MCHC RBC-ENTMCNC: 23.9 PG — LOW (ref 27–34)
MCHC RBC-ENTMCNC: 30.9 GM/DL — LOW (ref 32–36)
MCHC RBC-ENTMCNC: 31.7 GM/DL — LOW (ref 32–36)
MCV RBC AUTO: 75.6 FL — LOW (ref 80–100)
MCV RBC AUTO: 77.2 FL — LOW (ref 80–100)
MONOCYTES # BLD AUTO: 0.12 K/UL — SIGNIFICANT CHANGE UP (ref 0–0.9)
MONOCYTES NFR BLD AUTO: 1.4 % — LOW (ref 2–14)
NEUTROPHILS # BLD AUTO: 7.67 K/UL — HIGH (ref 1.8–7.4)
NEUTROPHILS NFR BLD AUTO: 86.6 % — HIGH (ref 43–77)
NITRITE UR-MCNC: NEGATIVE — SIGNIFICANT CHANGE UP
NON HDL CHOLESTEROL: 180 MG/DL — HIGH
NRBC # BLD: 0 /100 WBCS — SIGNIFICANT CHANGE UP (ref 0–0)
NRBC # BLD: 0 /100 WBCS — SIGNIFICANT CHANGE UP (ref 0–0)
NT-PROBNP SERPL-SCNC: 435 PG/ML — HIGH (ref 0–300)
PH UR: 6 — SIGNIFICANT CHANGE UP (ref 5–8)
PLATELET # BLD AUTO: 363 K/UL — SIGNIFICANT CHANGE UP (ref 150–400)
PLATELET # BLD AUTO: 392 K/UL — SIGNIFICANT CHANGE UP (ref 150–400)
POTASSIUM SERPL-MCNC: 2.9 MMOL/L — CRITICAL LOW (ref 3.5–5.3)
POTASSIUM SERPL-MCNC: 3 MMOL/L — LOW (ref 3.5–5.3)
POTASSIUM SERPL-MCNC: 3 MMOL/L — LOW (ref 3.5–5.3)
POTASSIUM SERPL-MCNC: 3.2 MMOL/L — LOW (ref 3.5–5.3)
POTASSIUM SERPL-MCNC: 3.7 MMOL/L — SIGNIFICANT CHANGE UP (ref 3.5–5.3)
POTASSIUM SERPL-SCNC: 2.9 MMOL/L — CRITICAL LOW (ref 3.5–5.3)
POTASSIUM SERPL-SCNC: 3 MMOL/L — LOW (ref 3.5–5.3)
POTASSIUM SERPL-SCNC: 3 MMOL/L — LOW (ref 3.5–5.3)
POTASSIUM SERPL-SCNC: 3.2 MMOL/L — LOW (ref 3.5–5.3)
POTASSIUM SERPL-SCNC: 3.7 MMOL/L — SIGNIFICANT CHANGE UP (ref 3.5–5.3)
PROT SERPL-MCNC: 6.4 G/DL — SIGNIFICANT CHANGE UP (ref 6–8.3)
PROT SERPL-MCNC: 7.2 G/DL — SIGNIFICANT CHANGE UP (ref 6–8.3)
PROT SERPL-MCNC: 7.5 G/DL — SIGNIFICANT CHANGE UP (ref 6–8.3)
PROT UR-MCNC: 30 MG/DL
RBC # BLD: 4.65 M/UL — SIGNIFICANT CHANGE UP (ref 3.8–5.2)
RBC # BLD: 4.72 M/UL — SIGNIFICANT CHANGE UP (ref 3.8–5.2)
RBC # FLD: 14.8 % — HIGH (ref 10.3–14.5)
RBC # FLD: 15.1 % — HIGH (ref 10.3–14.5)
RBC CASTS # UR COMP ASSIST: ABNORMAL /HPF
SODIUM SERPL-SCNC: 130 MMOL/L — LOW (ref 135–145)
SODIUM SERPL-SCNC: 133 MMOL/L — LOW (ref 135–145)
SODIUM SERPL-SCNC: 134 MMOL/L — LOW (ref 135–145)
SODIUM SERPL-SCNC: 137 MMOL/L — SIGNIFICANT CHANGE UP (ref 135–145)
SODIUM SERPL-SCNC: 141 MMOL/L — SIGNIFICANT CHANGE UP (ref 135–145)
SP GR SPEC: 1.02 — SIGNIFICANT CHANGE UP (ref 1–1.03)
TRIGL SERPL-MCNC: 144 MG/DL — SIGNIFICANT CHANGE UP
TROPONIN T, HIGH SENSITIVITY RESULT: 32 NG/L — SIGNIFICANT CHANGE UP (ref 0–51)
TSH SERPL-MCNC: 0.3 UIU/ML — SIGNIFICANT CHANGE UP (ref 0.27–4.2)
UROBILINOGEN FLD QL: 0.2 E.U./DL — SIGNIFICANT CHANGE UP
WBC # BLD: 11.3 K/UL — HIGH (ref 3.8–10.5)
WBC # BLD: 8.86 K/UL — SIGNIFICANT CHANGE UP (ref 3.8–10.5)
WBC # FLD AUTO: 11.3 K/UL — HIGH (ref 3.8–10.5)
WBC # FLD AUTO: 8.86 K/UL — SIGNIFICANT CHANGE UP (ref 3.8–10.5)
WBC UR QL: > 10 /HPF

## 2023-06-21 PROCEDURE — 93306 TTE W/DOPPLER COMPLETE: CPT | Mod: 26

## 2023-06-21 PROCEDURE — 99223 1ST HOSP IP/OBS HIGH 75: CPT

## 2023-06-21 PROCEDURE — 99254 IP/OBS CNSLTJ NEW/EST MOD 60: CPT

## 2023-06-21 PROCEDURE — 36000 PLACE NEEDLE IN VEIN: CPT

## 2023-06-21 PROCEDURE — 74177 CT ABD & PELVIS W/CONTRAST: CPT | Mod: 26,MA

## 2023-06-21 PROCEDURE — 99222 1ST HOSP IP/OBS MODERATE 55: CPT

## 2023-06-21 PROCEDURE — 71275 CT ANGIOGRAPHY CHEST: CPT | Mod: 26,MA

## 2023-06-21 PROCEDURE — 76937 US GUIDE VASCULAR ACCESS: CPT | Mod: 26

## 2023-06-21 RX ORDER — POTASSIUM CHLORIDE 20 MEQ
40 PACKET (EA) ORAL EVERY 4 HOURS
Refills: 0 | Status: COMPLETED | OUTPATIENT
Start: 2023-06-21 | End: 2023-06-21

## 2023-06-21 RX ORDER — POTASSIUM CHLORIDE 20 MEQ
20 PACKET (EA) ORAL ONCE
Refills: 0 | Status: COMPLETED | OUTPATIENT
Start: 2023-06-21 | End: 2023-06-21

## 2023-06-21 RX ORDER — ATORVASTATIN CALCIUM 80 MG/1
20 TABLET, FILM COATED ORAL AT BEDTIME
Refills: 0 | Status: DISCONTINUED | OUTPATIENT
Start: 2023-06-21 | End: 2023-06-27

## 2023-06-21 RX ORDER — LORATADINE 10 MG/1
10 TABLET ORAL DAILY
Refills: 0 | Status: DISCONTINUED | OUTPATIENT
Start: 2023-06-21 | End: 2023-06-24

## 2023-06-21 RX ORDER — OXYCODONE HYDROCHLORIDE 5 MG/1
5 TABLET ORAL EVERY 8 HOURS
Refills: 0 | Status: DISCONTINUED | OUTPATIENT
Start: 2023-06-21 | End: 2023-06-24

## 2023-06-21 RX ORDER — HEPARIN SODIUM 5000 [USP'U]/ML
4000 INJECTION INTRAVENOUS; SUBCUTANEOUS EVERY 6 HOURS
Refills: 0 | Status: DISCONTINUED | OUTPATIENT
Start: 2023-06-21 | End: 2023-06-21

## 2023-06-21 RX ORDER — HYDROMORPHONE HYDROCHLORIDE 2 MG/ML
0.5 INJECTION INTRAMUSCULAR; INTRAVENOUS; SUBCUTANEOUS ONCE
Refills: 0 | Status: DISCONTINUED | OUTPATIENT
Start: 2023-06-21 | End: 2023-06-21

## 2023-06-21 RX ORDER — ACETAMINOPHEN 500 MG
650 TABLET ORAL EVERY 6 HOURS
Refills: 0 | Status: DISCONTINUED | OUTPATIENT
Start: 2023-06-21 | End: 2023-06-21

## 2023-06-21 RX ORDER — DEXTROSE 50 % IN WATER 50 %
12.5 SYRINGE (ML) INTRAVENOUS ONCE
Refills: 0 | Status: DISCONTINUED | OUTPATIENT
Start: 2023-06-21 | End: 2023-06-29

## 2023-06-21 RX ORDER — DIPHENHYDRAMINE HCL 50 MG
50 CAPSULE ORAL
Refills: 0 | Status: DISCONTINUED | OUTPATIENT
Start: 2023-06-22 | End: 2023-06-22

## 2023-06-21 RX ORDER — DEXTROSE 50 % IN WATER 50 %
25 SYRINGE (ML) INTRAVENOUS ONCE
Refills: 0 | Status: DISCONTINUED | OUTPATIENT
Start: 2023-06-21 | End: 2023-06-29

## 2023-06-21 RX ORDER — LACTOBACILLUS ACIDOPHILUS 100MM CELL
1 CAPSULE ORAL DAILY
Refills: 0 | Status: DISCONTINUED | OUTPATIENT
Start: 2023-06-21 | End: 2023-06-29

## 2023-06-21 RX ORDER — SODIUM CHLORIDE 9 MG/ML
1000 INJECTION INTRAMUSCULAR; INTRAVENOUS; SUBCUTANEOUS ONCE
Refills: 0 | Status: COMPLETED | OUTPATIENT
Start: 2023-06-21 | End: 2023-06-21

## 2023-06-21 RX ORDER — DIPHENHYDRAMINE HCL 50 MG
50 CAPSULE ORAL ONCE
Refills: 0 | Status: COMPLETED | OUTPATIENT
Start: 2023-06-21 | End: 2023-06-21

## 2023-06-21 RX ORDER — LABETALOL HCL 100 MG
100 TABLET ORAL
Refills: 0 | Status: DISCONTINUED | OUTPATIENT
Start: 2023-06-21 | End: 2023-06-21

## 2023-06-21 RX ORDER — INSULIN LISPRO 100/ML
10 VIAL (ML) SUBCUTANEOUS
Refills: 0 | Status: DISCONTINUED | OUTPATIENT
Start: 2023-06-21 | End: 2023-06-21

## 2023-06-21 RX ORDER — SODIUM CHLORIDE 9 MG/ML
1000 INJECTION INTRAMUSCULAR; INTRAVENOUS; SUBCUTANEOUS
Refills: 0 | Status: DISCONTINUED | OUTPATIENT
Start: 2023-06-21 | End: 2023-06-21

## 2023-06-21 RX ORDER — HEPARIN SODIUM 5000 [USP'U]/ML
INJECTION INTRAVENOUS; SUBCUTANEOUS
Qty: 25000 | Refills: 0 | Status: DISCONTINUED | OUTPATIENT
Start: 2023-06-21 | End: 2023-06-26

## 2023-06-21 RX ORDER — SODIUM CHLORIDE 9 MG/ML
1000 INJECTION, SOLUTION INTRAVENOUS
Refills: 0 | Status: DISCONTINUED | OUTPATIENT
Start: 2023-06-21 | End: 2023-06-29

## 2023-06-21 RX ORDER — IPRATROPIUM BROMIDE 0.2 MG/ML
500 SOLUTION, NON-ORAL INHALATION EVERY 6 HOURS
Refills: 0 | Status: DISCONTINUED | OUTPATIENT
Start: 2023-06-21 | End: 2023-06-21

## 2023-06-21 RX ORDER — GABAPENTIN 400 MG/1
600 CAPSULE ORAL
Refills: 0 | Status: DISCONTINUED | OUTPATIENT
Start: 2023-06-21 | End: 2023-06-22

## 2023-06-21 RX ORDER — INSULIN HUMAN 100 [IU]/ML
100 INJECTION, SOLUTION SUBCUTANEOUS
Refills: 0 | DISCHARGE

## 2023-06-21 RX ORDER — ALBUTEROL 90 UG/1
2 AEROSOL, METERED ORAL EVERY 6 HOURS
Refills: 0 | Status: DISCONTINUED | OUTPATIENT
Start: 2023-06-21 | End: 2023-06-29

## 2023-06-21 RX ORDER — POTASSIUM CHLORIDE 20 MEQ
40 PACKET (EA) ORAL ONCE
Refills: 0 | Status: COMPLETED | OUTPATIENT
Start: 2023-06-21 | End: 2023-06-21

## 2023-06-21 RX ORDER — LABETALOL HCL 100 MG
1 TABLET ORAL
Qty: 0 | Refills: 0 | DISCHARGE

## 2023-06-21 RX ORDER — HEPARIN SODIUM 5000 [USP'U]/ML
8500 INJECTION INTRAVENOUS; SUBCUTANEOUS EVERY 6 HOURS
Refills: 0 | Status: DISCONTINUED | OUTPATIENT
Start: 2023-06-21 | End: 2023-06-21

## 2023-06-21 RX ORDER — DEXTROSE 50 % IN WATER 50 %
15 SYRINGE (ML) INTRAVENOUS ONCE
Refills: 0 | Status: DISCONTINUED | OUTPATIENT
Start: 2023-06-21 | End: 2023-06-29

## 2023-06-21 RX ORDER — INSULIN LISPRO 100/ML
10 VIAL (ML) SUBCUTANEOUS
Refills: 0 | Status: DISCONTINUED | OUTPATIENT
Start: 2023-06-21 | End: 2023-06-23

## 2023-06-21 RX ORDER — MAGNESIUM SULFATE 500 MG/ML
2 VIAL (ML) INJECTION ONCE
Refills: 0 | Status: COMPLETED | OUTPATIENT
Start: 2023-06-21 | End: 2023-06-21

## 2023-06-21 RX ORDER — GLUCAGON INJECTION, SOLUTION 0.5 MG/.1ML
1 INJECTION, SOLUTION SUBCUTANEOUS ONCE
Refills: 0 | Status: DISCONTINUED | OUTPATIENT
Start: 2023-06-21 | End: 2023-06-29

## 2023-06-21 RX ORDER — MEROPENEM 1 G/30ML
1000 INJECTION INTRAVENOUS ONCE
Refills: 0 | Status: COMPLETED | OUTPATIENT
Start: 2023-06-21 | End: 2023-06-21

## 2023-06-21 RX ORDER — INSULIN LISPRO 100/ML
VIAL (ML) SUBCUTANEOUS
Refills: 0 | Status: DISCONTINUED | OUTPATIENT
Start: 2023-06-21 | End: 2023-06-29

## 2023-06-21 RX ORDER — METOPROLOL TARTRATE 50 MG
50 TABLET ORAL AT BEDTIME
Refills: 0 | Status: DISCONTINUED | OUTPATIENT
Start: 2023-06-21 | End: 2023-06-24

## 2023-06-21 RX ORDER — OXYBUTYNIN CHLORIDE 5 MG
5 TABLET ORAL THREE TIMES A DAY
Refills: 0 | Status: DISCONTINUED | OUTPATIENT
Start: 2023-06-21 | End: 2023-06-29

## 2023-06-21 RX ORDER — ASPIRIN/CALCIUM CARB/MAGNESIUM 324 MG
81 TABLET ORAL DAILY
Refills: 0 | Status: DISCONTINUED | OUTPATIENT
Start: 2023-06-21 | End: 2023-06-26

## 2023-06-21 RX ORDER — ACETAMINOPHEN 500 MG
650 TABLET ORAL EVERY 6 HOURS
Refills: 0 | Status: DISCONTINUED | OUTPATIENT
Start: 2023-06-21 | End: 2023-06-22

## 2023-06-21 RX ORDER — NALOXONE HYDROCHLORIDE 4 MG/.1ML
1 SPRAY NASAL
Refills: 0 | Status: DISCONTINUED | OUTPATIENT
Start: 2023-06-21 | End: 2023-06-21

## 2023-06-21 RX ORDER — INSULIN GLARGINE 100 [IU]/ML
70 INJECTION, SOLUTION SUBCUTANEOUS
Refills: 0 | Status: DISCONTINUED | OUTPATIENT
Start: 2023-06-21 | End: 2023-06-21

## 2023-06-21 RX ADMIN — Medication 650 MILLIGRAM(S): at 10:33

## 2023-06-21 RX ADMIN — MEROPENEM 100 MILLIGRAM(S): 1 INJECTION INTRAVENOUS at 14:15

## 2023-06-21 RX ADMIN — HYDROMORPHONE HYDROCHLORIDE 0.5 MILLIGRAM(S): 2 INJECTION INTRAMUSCULAR; INTRAVENOUS; SUBCUTANEOUS at 08:01

## 2023-06-21 RX ADMIN — Medication 81 MILLIGRAM(S): at 11:51

## 2023-06-21 RX ADMIN — Medication 10 MILLIEQUIVALENT(S): at 04:50

## 2023-06-21 RX ADMIN — Medication 40 MILLIEQUIVALENT(S): at 22:14

## 2023-06-21 RX ADMIN — Medication 40 MILLIEQUIVALENT(S): at 19:54

## 2023-06-21 RX ADMIN — Medication 650 MILLIGRAM(S): at 17:40

## 2023-06-21 RX ADMIN — INSULIN GLARGINE 70 UNIT(S): 100 INJECTION, SOLUTION SUBCUTANEOUS at 10:02

## 2023-06-21 RX ADMIN — Medication 5 MILLIGRAM(S): at 14:16

## 2023-06-21 RX ADMIN — HYDROMORPHONE HYDROCHLORIDE 0.5 MILLIGRAM(S): 2 INJECTION INTRAMUSCULAR; INTRAVENOUS; SUBCUTANEOUS at 23:15

## 2023-06-21 RX ADMIN — HYDROMORPHONE HYDROCHLORIDE 0.5 MILLIGRAM(S): 2 INJECTION INTRAMUSCULAR; INTRAVENOUS; SUBCUTANEOUS at 00:11

## 2023-06-21 RX ADMIN — Medication 50 MILLIGRAM(S): at 22:14

## 2023-06-21 RX ADMIN — SODIUM CHLORIDE 1000 MILLILITER(S): 9 INJECTION INTRAMUSCULAR; INTRAVENOUS; SUBCUTANEOUS at 01:54

## 2023-06-21 RX ADMIN — Medication 2 GRAM(S): at 04:49

## 2023-06-21 RX ADMIN — SODIUM CHLORIDE 75 MILLILITER(S): 9 INJECTION INTRAMUSCULAR; INTRAVENOUS; SUBCUTANEOUS at 06:58

## 2023-06-21 RX ADMIN — SODIUM CHLORIDE 1000 MILLILITER(S): 9 INJECTION INTRAMUSCULAR; INTRAVENOUS; SUBCUTANEOUS at 13:02

## 2023-06-21 RX ADMIN — OXYCODONE HYDROCHLORIDE 5 MILLIGRAM(S): 5 TABLET ORAL at 19:58

## 2023-06-21 RX ADMIN — Medication 25 GRAM(S): at 19:54

## 2023-06-21 RX ADMIN — Medication 100 MILLIEQUIVALENT(S): at 01:54

## 2023-06-21 RX ADMIN — Medication 50 MILLIGRAM(S): at 00:17

## 2023-06-21 RX ADMIN — Medication 1 TABLET(S): at 20:45

## 2023-06-21 RX ADMIN — Medication 650 MILLIGRAM(S): at 18:44

## 2023-06-21 RX ADMIN — Medication 5 MILLIGRAM(S): at 22:13

## 2023-06-21 RX ADMIN — ATORVASTATIN CALCIUM 20 MILLIGRAM(S): 80 TABLET, FILM COATED ORAL at 22:14

## 2023-06-21 RX ADMIN — OXYCODONE HYDROCHLORIDE 5 MILLIGRAM(S): 5 TABLET ORAL at 11:33

## 2023-06-21 RX ADMIN — Medication 20 MILLIEQUIVALENT(S): at 13:02

## 2023-06-21 RX ADMIN — SODIUM CHLORIDE 1000 MILLILITER(S): 9 INJECTION INTRAMUSCULAR; INTRAVENOUS; SUBCUTANEOUS at 00:05

## 2023-06-21 RX ADMIN — Medication 650 MILLIGRAM(S): at 11:33

## 2023-06-21 RX ADMIN — HYDROMORPHONE HYDROCHLORIDE 0.5 MILLIGRAM(S): 2 INJECTION INTRAMUSCULAR; INTRAVENOUS; SUBCUTANEOUS at 04:57

## 2023-06-21 RX ADMIN — GABAPENTIN 600 MILLIGRAM(S): 400 CAPSULE ORAL at 14:16

## 2023-06-21 RX ADMIN — HEPARIN SODIUM 1800 UNIT(S)/HR: 5000 INJECTION INTRAVENOUS; SUBCUTANEOUS at 18:01

## 2023-06-21 RX ADMIN — Medication 12: at 11:51

## 2023-06-21 RX ADMIN — Medication 40 MILLIEQUIVALENT(S): at 13:02

## 2023-06-21 RX ADMIN — OXYCODONE HYDROCHLORIDE 5 MILLIGRAM(S): 5 TABLET ORAL at 18:58

## 2023-06-21 RX ADMIN — Medication 40 MILLIEQUIVALENT(S): at 17:26

## 2023-06-21 RX ADMIN — Medication 50 MILLIEQUIVALENT(S): at 21:42

## 2023-06-21 RX ADMIN — HYDROMORPHONE HYDROCHLORIDE 0.5 MILLIGRAM(S): 2 INJECTION INTRAMUSCULAR; INTRAVENOUS; SUBCUTANEOUS at 22:37

## 2023-06-21 RX ADMIN — SODIUM CHLORIDE 1000 MILLILITER(S): 9 INJECTION INTRAMUSCULAR; INTRAVENOUS; SUBCUTANEOUS at 01:56

## 2023-06-21 RX ADMIN — Medication 12: at 07:12

## 2023-06-21 RX ADMIN — Medication 10 UNIT(S): at 18:52

## 2023-06-21 RX ADMIN — Medication 40 MILLIEQUIVALENT(S): at 09:30

## 2023-06-21 RX ADMIN — Medication 100 MILLIGRAM(S): at 06:58

## 2023-06-21 RX ADMIN — Medication 4: at 22:23

## 2023-06-21 RX ADMIN — HYDROMORPHONE HYDROCHLORIDE 0.5 MILLIGRAM(S): 2 INJECTION INTRAMUSCULAR; INTRAVENOUS; SUBCUTANEOUS at 02:23

## 2023-06-21 RX ADMIN — OXYCODONE HYDROCHLORIDE 5 MILLIGRAM(S): 5 TABLET ORAL at 10:33

## 2023-06-21 RX ADMIN — Medication 25 GRAM(S): at 01:21

## 2023-06-21 RX ADMIN — HEPARIN SODIUM 1800 UNIT(S)/HR: 5000 INJECTION INTRAVENOUS; SUBCUTANEOUS at 10:03

## 2023-06-21 RX ADMIN — GABAPENTIN 600 MILLIGRAM(S): 400 CAPSULE ORAL at 22:14

## 2023-06-21 RX ADMIN — HYDROMORPHONE HYDROCHLORIDE 0.5 MILLIGRAM(S): 2 INJECTION INTRAMUSCULAR; INTRAVENOUS; SUBCUTANEOUS at 07:01

## 2023-06-21 NOTE — H&P ADULT - PROBLEM SELECTOR PLAN 5
- Ostomy RN consulted, f/u recs - Prior admission 5/22-6/1 for UTI of suprapubic catheter  - ESBL Klebsiella  - F/u UCx  - pt reports her catheter "comes in and out all the time" --> IR consulted, will await recs - In ED, CT Abdomen without acute findings  - Ostomy RN consulted, f/u recs  - abdomen diffusely tender however nonrigid and nonguarding - c/w serial abd exams - In ED, CT Abdomen without acute findings  - Ostomy RN consulted, f/u recs  - abdomen diffusely tender however nonrigid and nonguarding - c/w serial abd exams  - consider Pain Mgmt consult - SCr up to 1.86 initial ED labs, improving s/p IVF --> 1.46 --> 1.3  - c/w IVF  - avoid nephrotoxins; renally dose all meds  - s/p CT dye load in ED, c/w hydration   - Consult Renal if LORA worsens

## 2023-06-21 NOTE — PROVIDER CONTACT NOTE (HYPOGLYCEMIA EVENT) - NS PROVIDER CONTACT BACKGROUND-HYPO
Age: 43y    Gender: Female    POCT Blood Glucose:  115 mg/dL (06-21-23 @ 16:04)  54 mg/dL (06-21-23 @ 15:44)  127 mg/dL (06-21-23 @ 14:16)  329 mg/dL (06-21-23 @ 12:12)  449 mg/dL (06-21-23 @ 11:00)  547 mg/dL (06-21-23 @ 08:44)  561 mg/dL (06-21-23 @ 07:42)  552 mg/dL (06-21-23 @ 06:53)      eMAR:  insulin glargine Injectable (LANTUS)   70 Unit(s) SubCutaneous (06-21-23 @ 10:02)    insulin lispro (ADMELOG) corrective regimen sliding scale   12 Unit(s) SubCutaneous (06-21-23 @ 11:51)   12 Unit(s) SubCutaneous (06-21-23 @ 07:12)    methylPREDNISolone sodium succinate Injectable   40 milliGRAM(s) IV Push (06-20-23 @ 21:11)

## 2023-06-21 NOTE — CONSULT NOTE ADULT - SUBJECTIVE AND OBJECTIVE BOX
43F w/ asthma, PE (on eliquis), HTN, DM, HLD, abdominal nec fasc (suprapubic catheter, ostomy), presenting with left sided chest pain for 1 day. Pressure-like to left chest and under left breast. feels short of breath with exertion. Denies fevers, cough, vomiting, abd pain, recent travel.     In the ED:  Initial vital signs: T: XX F, HR: XX, BP: XX, R: XX, SpO2: XX% on RA  Labs: significant for  Imaging:  CXR:   EKG:   Medications:   Consults: none       PAST MEDICAL & SURGICAL HISTORY:  Essential hypertension      Hyperlipidemia      Diabetes      Obesity      Abdominal hernia      Pre-eclampsia      Pulmonary embolism      H/O LEEP      History of D&C      H/O:       H/O ventral hernia repair      H/O exploratory laparotomy          Home Medications:  acetaminophen 325 mg oral tablet: 3 tab(s) orally every 8 hours (2023 15:19)  apixaban 5 mg oral tablet: 1 tab(s) orally every 12 hours (23 May 2023 08:45)  HumuLIN R (Concentrated) 500 units/mL subcutaneous solution: 100 unit(s) subcutaneous 3 times a day (23 May 2023 08:45)  labetalol 100 mg oral tablet: 1 dose(s) orally once a day (23 May 2023 08:45)    MEDICATIONS  (STANDING):  aspirin enteric coated 81 milliGRAM(s) Oral daily  dextrose 5%. 1000 milliLiter(s) (50 mL/Hr) IV Continuous <Continuous>  dextrose 5%. 1000 milliLiter(s) (100 mL/Hr) IV Continuous <Continuous>  dextrose 50% Injectable 12.5 Gram(s) IV Push once  dextrose 50% Injectable 25 Gram(s) IV Push once  dextrose 50% Injectable 25 Gram(s) IV Push once  glucagon  Injectable 1 milliGRAM(s) IntraMuscular once  heparin  Infusion.  Unit(s)/Hr (18 mL/Hr) IV Continuous <Continuous>  insulin lispro (ADMELOG) corrective regimen sliding scale   SubCutaneous Before meals and at bedtime  labetalol 100 milliGRAM(s) Oral two times a day  oxybutynin 5 milliGRAM(s) Oral three times a day  potassium chloride    Tablet ER 40 milliEquivalent(s) Oral once  sodium chloride 0.9%. 1000 milliLiter(s) (75 mL/Hr) IV Continuous <Continuous>    MEDICATIONS  (PRN):  acetaminophen     Tablet .. 650 milliGRAM(s) Oral every 6 hours PRN Temp greater or equal to 38C (100.4F), Moderate Pain (4 - 6)  dextrose Oral Gel 15 Gram(s) Oral once PRN Blood Glucose LESS THAN 70 milliGRAM(s)/deciliter  ipratropium    for Nebulization 500 MICROGram(s) Nebulizer every 6 hours PRN Shortness of Breath and/or Wheezing      Allergies    shellfish. (Hives; Anaphylaxis)  iodine containing compounds (Hives; Anaphylaxis)  fish (Hives; Urticaria)  clindamycin (Pruritus)  vancomycin (Anaphylaxis; Hives)  penicillin (Hives)  amoxicillin (Unknown)    Intolerances    Bactrim I.V. (Rash (Mod to Severe))      SOCIAL HX:       FAMILY HISTORY:  FAMILY HISTORY:  FH: diabetes mellitus  father and mother    FH: hypertension  father and mother    :      PHYSICAL EXAM:    ICU Vital Signs Last 24 Hrs  T(C): 36.6 (2023 08:47), Max: 37.1 (2023 19:28)  T(F): 97.9 (2023 08:47), Max: 98.7 (2023 19:28)  HR: 98 (2023 05:40) (98 - 135)  BP: 153/78 (2023 05:40) (135/90 - 153/78)  BP(mean): --  ABP: --  ABP(mean): --  RR: 16 (2023 05:40) (16 - 18)  SpO2: 94% (2023 05:40) (94% - 98%)    O2 Parameters below as of 2023 05:40  Patient On (Oxygen Delivery Method): room air            General: NC/AT, lying in bed   HEENT:  NC/AT, EOMI, sclera anicteric, PERRL       Lymphatic system: No LN  Lungs: Bilateral BS  Cardiovascular: RRR, nl s1, s2, no m/r/g appreciated  Gastrointestinal: abdomen soft, NTND, bowel sounds present  Musculoskeletal: No clubbing.  Moves all extremities.    Skin: Warm, dry, intact. No rashes noted.  Neurological: A&Ox3, strength 5/5 and sensation intact in all extremities         LABS:                          11.3   8.86  )-----------( 363      ( 2023 07:24 )             35.7                                               06-21    130<L>  |  95<L>  |  27<H>  ----------------------------<  623<HH>  3.7   |  18<L>  |  1.46<H>    Ca    8.3<L>      2023 07:24  Mg     1.9     06-    TPro  7.2  /  Alb  3.3  /  TBili  0.2  /  DBili  0.2  /  AST  12  /  ALT  9<L>  /  AlkPhos  159<H>  06-21      PT/INR - ( 2023 20:37 )   PT: 13.9 sec;   INR: 1.17          PTT - ( 2023 20:37 )  PTT:31.6 sec                                       Urinalysis Basic - ( 2023 07:24 )    Color: x / Appearance: x / SG: x / pH: x  Gluc: 623 mg/dL / Ketone: x  / Bili: x / Urobili: x   Blood: x / Protein: x / Nitrite: x   Leuk Esterase: x / RBC: x / WBC x   Sq Epi: x / Non Sq Epi: x / Bacteria: x        CARDIAC MARKERS ( 2023 07:24 )  x     / x     / 38 U/L / x     / 3.0 ng/mL                                            LIVER FUNCTIONS - ( 2023 07:24 )  Alb: 3.3 g/dL / Pro: 7.2 g/dL / ALK PHOS: 159 U/L / ALT: 9 U/L / AST: 12 U/L / GGT: x                                                  Urinalysis with Rflx Culture (collected 2023 02:37)                                                                                            43F w/ asthma, PE (on eliquis), HTN, DM, HLD, abdominal nec fasc (s/p suprapubic catheter and ostomy), presenting with left sided chest pain for 1 day. Describes as pressure-like to left chest and under left breast, and associated short of breath with exertion. Complains of no appetite for 2 days. Reports good medication compliance. Denies fevers, cough, N/V, abd pain, recent travel.     In the ED:  Initial vital signs: T: 98.7 F, HR: 135, BP: 135/90, R: 18, SpO2: 96% on RA  Labs: significant for WBC 16.8, plts 589, K 2.9, bicarb 23, Cr 1.86, Glc 194  Imaging:  CXR: No acute infiltrates  EKG: NSR, no ischemic changes, Qtc 539  Medications: NS 3L, methylpred 40 IV, KCl 40 PO, KCl 10x3 IV, Mag 2g IV  Consults: None      PAST MEDICAL & SURGICAL HISTORY:  Essential hypertension      Hyperlipidemia      Diabetes      Obesity      Abdominal hernia      Pre-eclampsia      Pulmonary embolism      H/O LEEP      History of D&C      H/O:       H/O ventral hernia repair      H/O exploratory laparotomy          Home Medications:  acetaminophen 325 mg oral tablet: 3 tab(s) orally every 8 hours (2023 15:19)  apixaban 5 mg oral tablet: 1 tab(s) orally every 12 hours (23 May 2023 08:45)  HumuLIN R (Concentrated) 500 units/mL subcutaneous solution: 100 unit(s) subcutaneous 3 times a day (23 May 2023 08:45)  labetalol 100 mg oral tablet: 1 dose(s) orally once a day (23 May 2023 08:45)    MEDICATIONS  (STANDING):  aspirin enteric coated 81 milliGRAM(s) Oral daily  dextrose 5%. 1000 milliLiter(s) (50 mL/Hr) IV Continuous <Continuous>  dextrose 5%. 1000 milliLiter(s) (100 mL/Hr) IV Continuous <Continuous>  dextrose 50% Injectable 12.5 Gram(s) IV Push once  dextrose 50% Injectable 25 Gram(s) IV Push once  dextrose 50% Injectable 25 Gram(s) IV Push once  glucagon  Injectable 1 milliGRAM(s) IntraMuscular once  heparin  Infusion.  Unit(s)/Hr (18 mL/Hr) IV Continuous <Continuous>  insulin lispro (ADMELOG) corrective regimen sliding scale   SubCutaneous Before meals and at bedtime  labetalol 100 milliGRAM(s) Oral two times a day  oxybutynin 5 milliGRAM(s) Oral three times a day  potassium chloride    Tablet ER 40 milliEquivalent(s) Oral once  sodium chloride 0.9%. 1000 milliLiter(s) (75 mL/Hr) IV Continuous <Continuous>    MEDICATIONS  (PRN):  acetaminophen     Tablet .. 650 milliGRAM(s) Oral every 6 hours PRN Temp greater or equal to 38C (100.4F), Moderate Pain (4 - 6)  dextrose Oral Gel 15 Gram(s) Oral once PRN Blood Glucose LESS THAN 70 milliGRAM(s)/deciliter  ipratropium    for Nebulization 500 MICROGram(s) Nebulizer every 6 hours PRN Shortness of Breath and/or Wheezing      Allergies    shellfish. (Hives; Anaphylaxis)  iodine containing compounds (Hives; Anaphylaxis)  fish (Hives; Urticaria)  clindamycin (Pruritus)  vancomycin (Anaphylaxis; Hives)  penicillin (Hives)  amoxicillin (Unknown)    Intolerances    Bactrim I.V. (Rash (Mod to Severe))      SOCIAL HX:       FAMILY HISTORY:  FAMILY HISTORY:  FH: diabetes mellitus  father and mother    FH: hypertension  father and mother    :      PHYSICAL EXAM:    ICU Vital Signs Last 24 Hrs  T(C): 36.6 (2023 08:47), Max: 37.1 (2023 19:28)  T(F): 97.9 (2023 08:47), Max: 98.7 (2023 19:28)  HR: 98 (2023 05:40) (98 - 135)  BP: 153/78 (2023 05:40) (135/90 - 153/78)  BP(mean): --  ABP: --  ABP(mean): --  RR: 16 (2023 05:40) (16 - 18)  SpO2: 94% (2023 05:40) (94% - 98%)    O2 Parameters below as of 2023 05:40  Patient On (Oxygen Delivery Method): room air            General: Appears uncomfortable, lying in bed   HEENT: NC/AT, EOMI, sclera anicteric, PERRL       Lymphatic system: No LN  Lungs: Bilateral BS  Cardiovascular: RRR, nl s1, s2, no m/r/g appreciated  Gastrointestinal: abdomen soft, NTND, bowel sounds present; colostomy in place, suprapubic catheter in place  Musculoskeletal: No clubbing.  Moves all extremities.    Skin: Warm, dry, intact. No rashes noted.  Neurological: A&Ox3, strength 5/5 and sensation intact in all extremities         LABS:                          11.3   8.86  )-----------( 363      ( 2023 07:24 )             35.7                                               06-    130<L>  |  95<L>  |  27<H>  ----------------------------<  623<HH>  3.7   |  18<L>  |  1.46<H>    Ca    8.3<L>      2023 07:24  Mg     1.9     -    TPro  7.2  /  Alb  3.3  /  TBili  0.2  /  DBili  0.2  /  AST  12  /  ALT  9<L>  /  AlkPhos  159<H>  -      PT/INR - ( 2023 20:37 )   PT: 13.9 sec;   INR: 1.17          PTT - ( 2023 20:37 )  PTT:31.6 sec                                       Urinalysis Basic - ( 2023 07:24 )    Color: x / Appearance: x / SG: x / pH: x  Gluc: 623 mg/dL / Ketone: x  / Bili: x / Urobili: x   Blood: x / Protein: x / Nitrite: x   Leuk Esterase: x / RBC: x / WBC x   Sq Epi: x / Non Sq Epi: x / Bacteria: x        CARDIAC MARKERS ( 2023 07:24 )  x     / x     / 38 U/L / x     / 3.0 ng/mL                                            LIVER FUNCTIONS - ( 2023 07:24 )  Alb: 3.3 g/dL / Pro: 7.2 g/dL / ALK PHOS: 159 U/L / ALT: 9 U/L / AST: 12 U/L / GGT: x                                                  Urinalysis with Rflx Culture (collected 2023 02:37)                                                                                            43F w/ asthma, PE (on eliquis), HTN, DM, HLD, abdominal nec fasc (s/p suprapubic catheter and ostomy on 2021), and recent admission to [facility] - for UTI, presenting with left sided chest pain for 1 day. Describes as pressure-like to left chest and under left breast, and associated short of breath with exertion. Complains of no appetite for 2 days. Reports good medication compliance. Denies fevers, cough, N/V, abd pain, recent travel.     In the ED:  Initial vital signs: T: 98.7 F, HR: 135, BP: 135/90, R: 18, SpO2: 96% on RA  Labs: significant for WBC 16.8, plts 589, K 2.9, bicarb 23, Cr 1.86, Glc 194  Imaging:  CXR: No acute infiltrates  EKG: NSR, no ischemic changes, Qtc 539  Medications: NS 3L, methylpred 40 IV, KCl 40 PO, KCl 10x3 IV, Mag 2g IV  Consults: None      PAST MEDICAL & SURGICAL HISTORY:  Essential hypertension      Hyperlipidemia      Diabetes      Obesity      Abdominal hernia      Pre-eclampsia      Pulmonary embolism      H/O LEEP      History of D&C      H/O:       H/O ventral hernia repair      H/O exploratory laparotomy          Home Medications:  acetaminophen 325 mg oral tablet: 3 tab(s) orally every 8 hours (2023 15:19)  apixaban 5 mg oral tablet: 1 tab(s) orally every 12 hours (23 May 2023 08:45)  HumuLIN R (Concentrated) 500 units/mL subcutaneous solution: 100 unit(s) subcutaneous 3 times a day (23 May 2023 08:45)  labetalol 100 mg oral tablet: 1 dose(s) orally once a day (23 May 2023 08:45)    MEDICATIONS  (STANDING):  aspirin enteric coated 81 milliGRAM(s) Oral daily  dextrose 5%. 1000 milliLiter(s) (50 mL/Hr) IV Continuous <Continuous>  dextrose 5%. 1000 milliLiter(s) (100 mL/Hr) IV Continuous <Continuous>  dextrose 50% Injectable 12.5 Gram(s) IV Push once  dextrose 50% Injectable 25 Gram(s) IV Push once  dextrose 50% Injectable 25 Gram(s) IV Push once  glucagon  Injectable 1 milliGRAM(s) IntraMuscular once  heparin  Infusion.  Unit(s)/Hr (18 mL/Hr) IV Continuous <Continuous>  insulin lispro (ADMELOG) corrective regimen sliding scale   SubCutaneous Before meals and at bedtime  labetalol 100 milliGRAM(s) Oral two times a day  oxybutynin 5 milliGRAM(s) Oral three times a day  potassium chloride    Tablet ER 40 milliEquivalent(s) Oral once  sodium chloride 0.9%. 1000 milliLiter(s) (75 mL/Hr) IV Continuous <Continuous>    MEDICATIONS  (PRN):  acetaminophen     Tablet .. 650 milliGRAM(s) Oral every 6 hours PRN Temp greater or equal to 38C (100.4F), Moderate Pain (4 - 6)  dextrose Oral Gel 15 Gram(s) Oral once PRN Blood Glucose LESS THAN 70 milliGRAM(s)/deciliter  ipratropium    for Nebulization 500 MICROGram(s) Nebulizer every 6 hours PRN Shortness of Breath and/or Wheezing      Allergies    shellfish. (Hives; Anaphylaxis)  iodine containing compounds (Hives; Anaphylaxis)  fish (Hives; Urticaria)  clindamycin (Pruritus)  vancomycin (Anaphylaxis; Hives)  penicillin (Hives)  amoxicillin (Unknown)    Intolerances    Bactrim I.V. (Rash (Mod to Severe))      SOCIAL HX:       FAMILY HISTORY:  FAMILY HISTORY:  FH: diabetes mellitus  father and mother    FH: hypertension  father and mother    :      PHYSICAL EXAM:    ICU Vital Signs Last 24 Hrs  T(C): 36.6 (2023 08:47), Max: 37.1 (2023 19:28)  T(F): 97.9 (2023 08:47), Max: 98.7 (2023 19:28)  HR: 98 (2023 05:40) (98 - 135)  BP: 153/78 (2023 05:40) (135/90 - 153/78)  BP(mean): --  ABP: --  ABP(mean): --  RR: 16 (2023 05:40) (16 - 18)  SpO2: 94% (2023 05:40) (94% - 98%)    O2 Parameters below as of 2023 05:40  Patient On (Oxygen Delivery Method): room air            General: Appears uncomfortable, lying in bed   HEENT: NC/AT, EOMI, sclera anicteric, PERRL       Lymphatic system: No LN  Lungs: Bilateral BS  Cardiovascular: RRR, nl s1, s2, no m/r/g appreciated  Gastrointestinal: abdomen soft, NTND, bowel sounds present; colostomy in place, suprapubic catheter in place  Musculoskeletal: No clubbing.  Moves all extremities.    Skin: Warm, dry, intact. No rashes noted.  Neurological: A&Ox3, strength 5/5 and sensation intact in all extremities         LABS:                          11.3   8.86  )-----------( 363      ( 2023 07:24 )             35.7                                               06-    130<L>  |  95<L>  |  27<H>  ----------------------------<  623<HH>  3.7   |  18<L>  |  1.46<H>    Ca    8.3<L>      2023 07:24  Mg     1.9     06-    TPro  7.2  /  Alb  3.3  /  TBili  0.2  /  DBili  0.2  /  AST  12  /  ALT  9<L>  /  AlkPhos  159<H>  06-21      PT/INR - ( 2023 20:37 )   PT: 13.9 sec;   INR: 1.17          PTT - ( 2023 20:37 )  PTT:31.6 sec                                       Urinalysis Basic - ( 2023 07:24 )    Color: x / Appearance: x / SG: x / pH: x  Gluc: 623 mg/dL / Ketone: x  / Bili: x / Urobili: x   Blood: x / Protein: x / Nitrite: x   Leuk Esterase: x / RBC: x / WBC x   Sq Epi: x / Non Sq Epi: x / Bacteria: x        CARDIAC MARKERS ( 2023 07:24 )  x     / x     / 38 U/L / x     / 3.0 ng/mL                                            LIVER FUNCTIONS - ( 2023 07:24 )  Alb: 3.3 g/dL / Pro: 7.2 g/dL / ALK PHOS: 159 U/L / ALT: 9 U/L / AST: 12 U/L / GGT: x                                                  Urinalysis with Rflx Culture (collected 2023 02:37)                                                                                            43F w/ asthma, PE (on eliquis), HTN, DM, HLD, abdominal nec fasc (s/p suprapubic catheter and ostomy on 2021), and recent admission to [facility] - for UTI, presenting with left sided chest pain for 1 day. Pain located primarily in left chest, non-radiating, and associated short of breath with exertion. Patient's has chronic abdominal pain since her admission for abdominal nec fasc over 1 year ago, which is stable. Complains of no appetite for 2 days. Reports good medication compliance with her home humulin and lispro. Reports that in the past her glucose has increased when in severe pain. Last admission she was discharged on gentamicin and ertapenem via PICC line for total 14 days. She completed the gentamicin but stopped ertapenem 2 days short because her PICC line stopped working. Denies fevers/chills, cough, or N/V/D/C.     In the ED:  Initial vital signs: T: 98.7 F, HR: 135, BP: 135/90, R: 18, SpO2: 96% on RA  Labs: significant for WBC 16.8, plts 589, K 2.9, bicarb 23, Cr 1.86, Glc 194  Imaging:  CXR: No acute infiltrates  EKG: NSR, no ischemic changes, Qtc 539  Medications: NS 3L, methylpred 40 IV, KCl 40 PO, KCl 10x3 IV, Mag 2g IV  Consults: None      PAST MEDICAL & SURGICAL HISTORY:  Essential hypertension      Hyperlipidemia      Diabetes      Obesity      Abdominal hernia      Pre-eclampsia      Pulmonary embolism      H/O LEEP      History of D&C      H/O:       H/O ventral hernia repair      H/O exploratory laparotomy          Home Medications:  acetaminophen 325 mg oral tablet: 3 tab(s) orally every 8 hours (2023 15:19)  apixaban 5 mg oral tablet: 1 tab(s) orally every 12 hours (23 May 2023 08:45)  HumuLIN R (Concentrated) 500 units/mL subcutaneous solution: 100 unit(s) subcutaneous 3 times a day (23 May 2023 08:45)  labetalol 100 mg oral tablet: 1 dose(s) orally once a day (23 May 2023 08:45)    MEDICATIONS  (STANDING):  aspirin enteric coated 81 milliGRAM(s) Oral daily  dextrose 5%. 1000 milliLiter(s) (50 mL/Hr) IV Continuous <Continuous>  dextrose 5%. 1000 milliLiter(s) (100 mL/Hr) IV Continuous <Continuous>  dextrose 50% Injectable 12.5 Gram(s) IV Push once  dextrose 50% Injectable 25 Gram(s) IV Push once  dextrose 50% Injectable 25 Gram(s) IV Push once  glucagon  Injectable 1 milliGRAM(s) IntraMuscular once  heparin  Infusion.  Unit(s)/Hr (18 mL/Hr) IV Continuous <Continuous>  insulin lispro (ADMELOG) corrective regimen sliding scale   SubCutaneous Before meals and at bedtime  labetalol 100 milliGRAM(s) Oral two times a day  oxybutynin 5 milliGRAM(s) Oral three times a day  potassium chloride    Tablet ER 40 milliEquivalent(s) Oral once  sodium chloride 0.9%. 1000 milliLiter(s) (75 mL/Hr) IV Continuous <Continuous>    MEDICATIONS  (PRN):  acetaminophen     Tablet .. 650 milliGRAM(s) Oral every 6 hours PRN Temp greater or equal to 38C (100.4F), Moderate Pain (4 - 6)  dextrose Oral Gel 15 Gram(s) Oral once PRN Blood Glucose LESS THAN 70 milliGRAM(s)/deciliter  ipratropium    for Nebulization 500 MICROGram(s) Nebulizer every 6 hours PRN Shortness of Breath and/or Wheezing      Allergies    shellfish. (Hives; Anaphylaxis)  iodine containing compounds (Hives; Anaphylaxis)  fish (Hives; Urticaria)  clindamycin (Pruritus)  vancomycin (Anaphylaxis; Hives)  penicillin (Hives)  amoxicillin (Unknown)    Intolerances    Bactrim I.V. (Rash (Mod to Severe))      SOCIAL HX:       FAMILY HISTORY:  FAMILY HISTORY:  FH: diabetes mellitus  father and mother    FH: hypertension  father and mother    :      PHYSICAL EXAM:    ICU Vital Signs Last 24 Hrs  T(C): 36.6 (2023 08:47), Max: 37.1 (2023 19:28)  T(F): 97.9 (2023 08:47), Max: 98.7 (2023 19:28)  HR: 98 (2023 05:40) (98 - 135)  BP: 153/78 (2023 05:40) (135/90 - 153/78)  BP(mean): --  ABP: --  ABP(mean): --  RR: 16 (2023 05:40) (16 - 18)  SpO2: 94% (2023 05:40) (94% - 98%)    O2 Parameters below as of 2023 05:40  Patient On (Oxygen Delivery Method): room air            General: Appears uncomfortable, lying in bed   HEENT: NC/AT, EOMI, sclera anicteric, PERRL       Lymphatic system: No LN  Lungs: Bilateral BS  Cardiovascular: RRR, nl s1, s2, no m/r/g appreciated  Gastrointestinal: abdomen soft, NTND, bowel sounds present; colostomy in place, suprapubic catheter in place  Musculoskeletal: No clubbing.  Moves all extremities.    Skin: Warm, dry, intact. No rashes noted.  Neurological: A&Ox3, strength 5/5 and sensation intact in all extremities         LABS:                          11.3   8.86  )-----------( 363      ( 2023 07:24 )             35.7                                               06-21    130<L>  |  95<L>  |  27<H>  ----------------------------<  623<HH>  3.7   |  18<L>  |  1.46<H>    Ca    8.3<L>      2023 07:24  Mg     1.9     06-21    TPro  7.2  /  Alb  3.3  /  TBili  0.2  /  DBili  0.2  /  AST  12  /  ALT  9<L>  /  AlkPhos  159<H>  06-21      PT/INR - ( 2023 20:37 )   PT: 13.9 sec;   INR: 1.17          PTT - ( 2023 20:37 )  PTT:31.6 sec                                       Urinalysis Basic - ( 2023 07:24 )    Color: x / Appearance: x / SG: x / pH: x  Gluc: 623 mg/dL / Ketone: x  / Bili: x / Urobili: x   Blood: x / Protein: x / Nitrite: x   Leuk Esterase: x / RBC: x / WBC x   Sq Epi: x / Non Sq Epi: x / Bacteria: x        CARDIAC MARKERS ( 2023 07:24 )  x     / x     / 38 U/L / x     / 3.0 ng/mL                                            LIVER FUNCTIONS - ( 2023 07:24 )  Alb: 3.3 g/dL / Pro: 7.2 g/dL / ALK PHOS: 159 U/L / ALT: 9 U/L / AST: 12 U/L / GGT: x                                                  Urinalysis with Rflx Culture (collected 2023 02:37)                                                                                            43F w/ HTN, DM2 (A1c 12), HLD, asthma, PE (on eliquis), abdominal nec fasc (s/p suprapubic catheter and ostomy in 2021 at [facility]), frequent UTIs, and recent admission to [facility] - for UTI, presenting with 1-day history of left sided chest pain and short of breath with exertion. Patient's has chronic abdominal pain since her admission for abdominal nec fasc over 1 year ago, which is stable. Complains of no appetite for 2 days. Reports good medication compliance with her home humulin and lispro. Reports that in the past her glucose has increased when in severe pain. Last admission she was discharged on gentamicin and ertapenem via PICC line for total 14 days. She completed the gentamicin but stopped ertapenem 2 days short because her PICC line stopped working. Denies fevers/chills, cough, or N/V/D/C.     In the ED:  Initial vital signs: T: 98.7 F, HR: 135, BP: 135/90, R: 18, SpO2: 96% on RA  Labs: significant for WBC 16.8, plts 589, K 2.9, bicarb 23, Cr 1.86, Glc 194  Imaging:  CXR: No acute infiltrates  EKG: NSR, no ischemic changes, Qtc 539  Medications: NS 3L, methylpred 40 IV, KCl 40 PO, KCl 10x3 IV, Mag 2g IV  Consults: None      PAST MEDICAL & SURGICAL HISTORY:  Essential hypertension      Hyperlipidemia      Diabetes      Obesity      Abdominal hernia      Pre-eclampsia      Pulmonary embolism      H/O LEEP      History of D&C      H/O:       H/O ventral hernia repair      H/O exploratory laparotomy          Home Medications:  acetaminophen 325 mg oral tablet: 3 tab(s) orally every 8 hours (2023 15:19)  apixaban 5 mg oral tablet: 1 tab(s) orally every 12 hours (23 May 2023 08:45)  HumuLIN R (Concentrated) 500 units/mL subcutaneous solution: 100 unit(s) subcutaneous 3 times a day (23 May 2023 08:45)  labetalol 100 mg oral tablet: 1 dose(s) orally once a day (23 May 2023 08:45)    MEDICATIONS  (STANDING):  aspirin enteric coated 81 milliGRAM(s) Oral daily  dextrose 5%. 1000 milliLiter(s) (50 mL/Hr) IV Continuous <Continuous>  dextrose 5%. 1000 milliLiter(s) (100 mL/Hr) IV Continuous <Continuous>  dextrose 50% Injectable 12.5 Gram(s) IV Push once  dextrose 50% Injectable 25 Gram(s) IV Push once  dextrose 50% Injectable 25 Gram(s) IV Push once  glucagon  Injectable 1 milliGRAM(s) IntraMuscular once  heparin  Infusion.  Unit(s)/Hr (18 mL/Hr) IV Continuous <Continuous>  insulin lispro (ADMELOG) corrective regimen sliding scale   SubCutaneous Before meals and at bedtime  labetalol 100 milliGRAM(s) Oral two times a day  oxybutynin 5 milliGRAM(s) Oral three times a day  potassium chloride    Tablet ER 40 milliEquivalent(s) Oral once  sodium chloride 0.9%. 1000 milliLiter(s) (75 mL/Hr) IV Continuous <Continuous>    MEDICATIONS  (PRN):  acetaminophen     Tablet .. 650 milliGRAM(s) Oral every 6 hours PRN Temp greater or equal to 38C (100.4F), Moderate Pain (4 - 6)  dextrose Oral Gel 15 Gram(s) Oral once PRN Blood Glucose LESS THAN 70 milliGRAM(s)/deciliter  ipratropium    for Nebulization 500 MICROGram(s) Nebulizer every 6 hours PRN Shortness of Breath and/or Wheezing      Allergies    shellfish. (Hives; Anaphylaxis)  iodine containing compounds (Hives; Anaphylaxis)  fish (Hives; Urticaria)  clindamycin (Pruritus)  vancomycin (Anaphylaxis; Hives)  penicillin (Hives)  amoxicillin (Unknown)    Intolerances    Bactrim I.V. (Rash (Mod to Severe))      SOCIAL HX:       FAMILY HISTORY:  FAMILY HISTORY:  FH: diabetes mellitus  father and mother    FH: hypertension  father and mother    :      PHYSICAL EXAM:    ICU Vital Signs Last 24 Hrs  T(C): 36.6 (2023 08:47), Max: 37.1 (2023 19:28)  T(F): 97.9 (2023 08:47), Max: 98.7 (2023 19:28)  HR: 98 (2023 05:40) (98 - 135)  BP: 153/78 (2023 05:40) (135/90 - 153/78)  BP(mean): --  ABP: --  ABP(mean): --  RR: 16 (2023 05:40) (16 - 18)  SpO2: 94% (2023 05:40) (94% - 98%)    O2 Parameters below as of 2023 05:40  Patient On (Oxygen Delivery Method): room air            General: Appears uncomfortable, lying in bed   HEENT: NC/AT, EOMI, sclera anicteric, PERRL       Lymphatic system: No LN  Lungs: Bilateral BS  Cardiovascular: RRR, nl s1, s2, no m/r/g appreciated  Gastrointestinal: abdomen soft, NTND, bowel sounds present; colostomy in place, suprapubic catheter in place  Musculoskeletal: No clubbing.  Moves all extremities.    Skin: Warm, dry, intact. No rashes noted.  Neurological: A&Ox3, strength 5/5 and sensation intact in all extremities         LABS:                          11.3   8.86  )-----------( 363      ( 2023 07:24 )             35.7                                               06-21    130<L>  |  95<L>  |  27<H>  ----------------------------<  623<HH>  3.7   |  18<L>  |  1.46<H>    Ca    8.3<L>      2023 07:24  Mg     1.9     06-21    TPro  7.2  /  Alb  3.3  /  TBili  0.2  /  DBili  0.2  /  AST  12  /  ALT  9<L>  /  AlkPhos  159<H>  06-21      PT/INR - ( 2023 20:37 )   PT: 13.9 sec;   INR: 1.17          PTT - ( 2023 20:37 )  PTT:31.6 sec                                       Urinalysis Basic - ( 2023 07:24 )    Color: x / Appearance: x / SG: x / pH: x  Gluc: 623 mg/dL / Ketone: x  / Bili: x / Urobili: x   Blood: x / Protein: x / Nitrite: x   Leuk Esterase: x / RBC: x / WBC x   Sq Epi: x / Non Sq Epi: x / Bacteria: x        CARDIAC MARKERS ( 2023 07:24 )  x     / x     / 38 U/L / x     / 3.0 ng/mL                                            LIVER FUNCTIONS - ( 2023 07:24 )  Alb: 3.3 g/dL / Pro: 7.2 g/dL / ALK PHOS: 159 U/L / ALT: 9 U/L / AST: 12 U/L / GGT: x                                                  Urinalysis with Rflx Culture (collected 2023 02:37)

## 2023-06-21 NOTE — CONSULT NOTE ADULT - ATTENDING COMMENTS
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,  PE (on Eliquis last dose AM 6/20/23), DM2-poorly controlled on insulin therapy, HTN, HLD, hx abdominal necrotizing fasciitis (s/p suprapubic catheter, ostomy placement in 11/2021), s/p intubation, hx of frequent UTIs 2/2 to suprapubic catheter, most recent UTI c/b Urosepsis admitted [facility] 5/23-6/1 (Klebsiella and Ecoli discharged with left midline for tx w/ Ertapenem 6/9 and Gentamicin 6/5; pt reports missing two days tx of Ertapenem due to midline malfunction; removed ~1week ago by home care RN)  who presesents to [facility] ED on 6/20/23 for chest pain ( pressure like sensation, Leucytosis, severe hyperglycemia ( more than 600s ), LORA , lactic acidosis - admitted to tele for elevated troponin and chest pain concern ACS.   she reported nausea and thus poor oral intake ( like to take ice )   chest pain ( initially was pressure like sensation 15/10- now at 7/10 intermittently with pain meds on board  she was taking oxycodone 10 mg ( up to twice daily prescribed on discharged from last admission -now getting oxycodone 5mg per dose and felt that is not enough for her.  reportedly took Gabapentin 600 tid for neuropathic pain in the left lower leg ( pain after the stroke )   she took care of ostomy and supra-pubic catheter- reported pain at the Supra-pubic catheter site ( felt the catheter is moving up and down and causing pain )=-   she was given 4 L of IVF, leucocytosis resolved, clinically dose not appear sick, speaking full sentence.    - leucocytosis / LORA/ supra-pubic pain with recent treated MDR UTI - now UA with bacteria/LE -no other source of infection seen, concern is recurrent UTI, fu blood cx, urine cx, would empirically treat with antibiotics, consult ID ( pt well known to ID service ) , consult IR ( supra-pubic catheter was placed by IR and reportedly pain at the site -? location, need to change the tube as well *( not being changed since placement as reported by patient )- ? does she need catheter.     - DM ( on home insulin with Hyperglycemia )- poorly controlled- hb A1c 11  presented with Hyperglycemia/ Hypokalemia/ AG 24 on admission, LORA- cr 1.8 - given insulin, IVF, now gap closed, fluctuating glucose level , nausea with poor oral intake   ? hyperglycemia related to infection/sepsis vs non-compliance   - endocrine has been consulted ( pt known to endo service )   - micu consulted for hyperglycemia -? need for more frequent glucose monitoring and insulin therapy     - chest pain with elevated troponin- pt with hx of DM -work up in progress for ACS   - fu TTE  - dw cardiology given LORA -could not pursue CTTA chest.  -would continue with tele monitoring for now.    - colostomy bag looks clean, due to her body habitus, skin fold around the supra-pubic catheter area, site tender ( no erythema noted ) would suggest IR/ EU evaluation for Catheter change ( position check ) prn meds for bladder spasm. ( can use oxybutynin vs piridium )   - neuropathic pain - reported gabapentin helps- would renally dose gabapentin - starts low 100 mg tid and to goes up as renal function allows  ( reported dose 600 mg tid -would not give that much currently with LORA   - she is getting oxycodone ( started since last admission - low dose for now due to LORA- pt explained )    thank you for allowing medicine to participate in the care, pt to remain in cardiology service , dw cardiology.  med consult with follow . 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,  PE (on Eliquis last dose AM 6/20/23), DM2-poorly controlled on insulin therapy, HTN, HLD, hx abdominal necrotizing fasciitis (s/p suprapubic catheter, ostomy placement in 11/2021), s/p intubation, hx of frequent UTIs 2/2 to suprapubic catheter, most recent UTI c/b Urosepsis admitted [facility] 5/23-6/1 (Klebsiella and Ecoli discharged with left midline for tx w/ Ertapenem 6/9 and Gentamicin 6/5; pt reports missing two days tx of Ertapenem due to midline malfunction; removed ~1week ago by home care RN)  who presesents to [facility] ED on 6/20/23 for chest pain ( pressure like sensation, Leucytosis, severe hyperglycemia ( more than 600s ), LORA , lactic acidosis - admitted to tele for elevated troponin and chest pain concern ACS.   she reported nausea and thus poor oral intake ( like to take ice )   chest pain ( initially was pressure like sensation 15/10- now at 7/10 intermittently with pain meds on board  she was taking oxycodone 10 mg ( up to twice daily prescribed on discharged from last admission -now getting oxycodone 5mg per dose and felt that is not enough for her.  reportedly took Gabapentin 600 tid for neuropathic pain in the left lower leg ( pain after the stroke )   she took care of ostomy and supra-pubic catheter- reported pain at the Supra-pubic catheter site ( felt the catheter is moving up and down and causing pain )=-   she was given 4 L of IVF, leucocytosis resolved, clinically dose not appear sick, speaking full sentence.    - leucocytosis / LORA/ supra-pubic pain with recent treated MDR UTI - now UA with bacteria/LE -no other source of infection seen, concern is recurrent UTI, fu blood cx, urine cx, would empirically treat with antibiotics, consult ID ( pt well known to ID service ) , consult IR ( supra-pubic catheter was placed by IR and reportedly pain at the site -? location, need to change the tube as well *( not being changed since placement as reported by patient )- ? does she need catheter.     - DM ( on home insulin with Hyperglycemia )- poorly controlled- hb A1c 11  presented with Hyperglycemia/ Hypokalemia/ AG 24 on admission, LORA- cr 1.8 - given insulin, IVF, now gap closed, fluctuating glucose level , nausea with poor oral intake   ? hyperglycemia related to infection/sepsis vs non-compliance   hypokalemia being replaced.  abdominal pain could be related to electrolyte imbalance   - endocrine has been consulted ( pt known to endo service )   - micu consulted for hyperglycemia -? need for more frequent glucose monitoring and insulin therapy     - chest pain with elevated troponin- NSTEMI,  - pt with hx of DM -work up in progress for ACS vs type 2 from [place] ischemia from metabolic derangement   - fu TTE  - dw cardiology given LORA -could not pursue CTTA chest.  -would continue with tele monitoring for now.    hx of PE on eliquis.    - colostomy bag looks clean, due to her body habitus, skin fold around the supra-pubic catheter area, site tender ( no erythema noted ) would suggest IR/ EU evaluation for Catheter change ( position check ) prn meds for bladder spasm. ( can use oxybutynin vs piridium )   - neuropathic pain - reported gabapentin helps- would renally dose gabapentin - starts low 100 mg tid and to goes up as renal function allows  ( reported dose 600 mg tid -would not give that much currently with LORA   - she is getting oxycodone ( started since last admission - low dose for now due to LORA- pt explained )    thank you for allowing medicine to participate in the care, pt to remain in cardiology service , dw cardiology.  med consult with follow .

## 2023-06-21 NOTE — H&P ADULT - PROBLEM SELECTOR PLAN 4
- ACS ruled out - Trop  55->52-->32  - given Asa 325mg PO x1 in ED  -  EKG showed sinus tachycardia at 100bpm, TW flattening in inferior and lateral walls. no acute ischemic changes; QTc 539ms.  - Substernal CP positional and pleuritic   - CTPA in ED w/o PE  (hx PE 04/23, on Eliquis at home)  - Hep gtt here initiated  - f/u TTE - expedited  - ESR and CRP elevated  - - In ED, CT Abdomen without acute findings  - Ostomy RN consulted, f/u recs  - abdomen diffusely tender however nonrigid and nonguarding - c/w serial abd exams - SCr up to 1.86 initial ED labs, improving s/p IVF - now at 1.46  - c/w IVF  - avoid nephrotoxins; renally dose all meds  - s/p CT dye load in ED, c/w hydration   - Consult Renal if LORA worsens - SCr up to 1.86 initial ED labs, improving s/p IVF --> 1.46 --> 1.3  - c/w IVF  - avoid nephrotoxins; renally dose all meds  - s/p CT dye load in ED, c/w hydration   - Consult Renal if LORA worsens - Substernal CP positional, pleuritic, and reproducible on exam w/ tenderness to palpation  - ACS ruled out - Trop  55->52-->32  - given Asa 325mg PO x1 in ED; c/w [id number] qd  - EKG showed sinus tachycardia at 100bpm, TW flattening in inferior and lateral walls. no acute ischemic changes; QTc 539ms.  - CTPA in ED w/o PE  (hx PE 04/23, on Eliquis at home)  - Hep gtt here initiated  - satting well on room air  - s/p urgent TTE - mild LVH and hyperdynamic LV; c/w dehydration;  no valvulopathy or pericardial effusion  - ESR and CRP elevated  - NPO-midnight for CCTA in AM to r/o ischemia. Pt will need premedication Solumedrol 40mg x1 prior to CCTA, as well as IVP 50mg Benadryl pre- and post-CCTA due to contrast allergy  (ordered as activatable)  - hold any QT prolonging agents - QTc 539ms  - monitor continuous telemetry + pulse ox; VSq4h  - dyslipidemia as below- starting Atorvastatin 20mg qhs

## 2023-06-21 NOTE — H&P ADULT - NSICDXPASTSURGICALHX_GEN_ALL_CORE_FT
PAST SURGICAL HISTORY:  H/O exploratory laparotomy     H/O LEEP     H/O ventral hernia repair     H/O:      History of creation of ostomy     History of D&C

## 2023-06-21 NOTE — H&P ADULT - PROBLEM SELECTOR PLAN 6
- CTPA in ED w/o PE  (hx PE 04/23, on Eliquis at home)  - Hep gtt here initiated - ACS ruled out - Trop  55->52-->32  - given Asa 325mg PO x1 in ED  -  EKG showed sinus tachycardia at 100bpm, TW flattening in inferior and lateral walls. no acute ischemic changes; QTc 539ms.  - Substernal CP positional and pleuritic   - CTPA in ED w/o PE  (hx PE 04/23, on Eliquis at home)  - Hep gtt here initiated  - f/u TTE - expedited  - ESR and CRP elevated  - consider NST r/o CAD   - hold any QT prolonging agents - QTc 539ms - Prior admission 5/22-6/1 for UTI of suprapubic catheter,  ESBL Klebsiella  - F/u UCx  - diffusely tender abdomen worse in suprapubic area; c/w serial abdominal exams  - CT abd nonrevealing as above  - pt reports her catheter "comes in and out all the time" --> IR consulted, will await recs - , starting Atorvastatin 20mg qhs

## 2023-06-21 NOTE — ADVANCED PRACTICE NURSE CONSULT - ASSESSMENT
Pt awake, alert and oriented. Has established colostomy from 2011. Patient independently cares for ostomy. Denies any issues with ostomy or peristomal skin.  Uses 1 piece closed end [place] appliance. Changes pouch every other day - last changed yesterday.  Typically has soft formed stool; reports it was more liquidy yesterday but this resolved.     Colostomy: stoma pink, protruding. Pouch intact without any leakage. No stool in pouch.   Patient uses a precut pouch that is larger than her stoma so I was able to visualize her peristomal skin which is intact without signs of infection.  Patient prefers to continue to use her current pouching system and reports no issues with hole being larger than stoma as she changes her pouch with each bowel movement.     Suprapubic tube: peritubular skin intact without redness or cellulitis. Small amount of thin clean drainage around tube.  Pt awake, alert and oriented. Has established colostomy from 2021. Patient independently cares for ostomy. Denies any issues with ostomy or peristomal skin.  Uses 1 piece closed end [place] appliance. Changes pouch every other day - last changed yesterday.  Typically has soft formed stool; reports it was more liquidy yesterday but this resolved.     Colostomy: stoma pink, protruding. Pouch intact without any leakage. No stool in pouch.   Patient uses a precut pouch that is larger than her stoma so I was able to visualize her peristomal skin which is intact without signs of infection.  Patient prefers to continue to use her current pouching system and reports no issues with hole being larger than stoma as she changes her pouch with each bowel movement.     Suprapubic tube: peritubular skin intact without redness or cellulitis. Small amount of thin clean drainage around tube.

## 2023-06-21 NOTE — ADVANCED PRACTICE NURSE CONSULT - REASON FOR CONSULT
ostomy    Per chart review, 43F w/ asthma, PE (on eliquis), HTN, DM, HLD, abdominal nec fasc (suprapubic catheter, ostomy), presenting with left sided chest pain for 1 day. Pressure-like to left chest and under left breast. feels short of breath with exertion. Denies fevers, cough, vomiting, abd pain, recent travel.

## 2023-06-21 NOTE — CHART NOTE - NSCHARTNOTEFT_GEN_A_CORE
Called by primary team for management of multiple co-morbidities and possible sepsis.     In the ED, patient was afebrile with tachycardia to the 130s and leukocytosis to 16. No bands. lactate of 7.3, which improved to 3.9 after 3L NS    On evaluation, pt reports ongoing chest pain, described as pressure, with radiation to her neck. No other localizing symptoms: no fevers/chills, shortness of breath, cough, abdominal pain, vomiting, dysuria/increased urinary frequency (pt with suprapubic catheter). Pt reports her ostomy had increased liquid output yesterday, but has now resolved.     Pt seen and evaluated at bedside. Warm and well perfused on exam, AOx3, mentating well, non-toxic appearing  CTPE completed in the ED, negative for lung pathology. CT Abdomen without acute findings. UA shows WBCs, trace leuk esterase, no nitrite, +bacteria  Concern for severe sepsis, source unclear at this time given negative imaging and no localizing symptoms on exam or history. Recommended empiric broad spectrum antibiotics. Given vanc allergy and no pulmonary infection on imaging, recommend 1 time dose of daptomycin for MRSA coverage. Advise 1 time dose of meropenem as well given prior hospitalization with multiple MDRO.   Advised to repeat lactate and obtain rectal temp    Full med consult note to follow.

## 2023-06-21 NOTE — H&P ADULT - PROBLEM SELECTOR PLAN 2
- hyperglycemia >550   - NPO and c/w NS IVF   - a1c 11.9%   - anion gap 24 on admission, now closed at 17  - pt with severe insulin resistance, recently admitted on Lantus 80units BID   - started 70units Lantus BID STAT,  Endocrine consulted  - s/p potassium repletion  - BHB 0.5   - continue to monitor lytes closely and FSq2h for now - p/w DKA:  hyperglycemia >550   - NPO and c/w NS IVF   - anion gap 24 on admission, now closing at 17  - s/p potassium repletion  - BHB 0.5   - a1c 11.9%   - pt with severe insulin resistance, recently admitted on Lantus 80units BID   - started 70units Lantus BID STAT,  Endocrine consulted  - continue to monitor lytes closely and FSq2h for now - p/w DKA vs HHS vs uncontrolled hyperglycemia i/s/o SIRS  - Endocrine consulted and await recs  - hyperglycemia >550  - NPO and c/w NS IVF  --> now improved FSBG to 300s  - anion gap 24 on admission, now closing at 17  - f/u VBG STAT  - s/p potassium repletion   - BHB 0.5   - a1c 11.9%   - pt with severe insulin resistance, recently admitted on Lantus 80units BID   - started 70units Lantus BID STAT,  Endocrine consulted  - continue to monitor lytes closely and FSq2h for now - DKA vs HHS vs uncontrolled hyperglycemia i/s/o SIRS  - Endocrine consulted since AM and await recs  - hyperglycemia >550  NPO and c/w NS IVF--> now improved to 127mg/dl - diet reordered  - anion gap 24 on admission, now closing at 17;  f/u VBG   - s/p potassium repletion   - BHB 0.5, repeat ordered  - A1c 11.9%   - pt with severe insulin resistance, recently admitted on Lantus 80units BID   - started 70units Lantus BID STAT, as per verbal  Endocrine rec  - continue to monitor lytes closely and FSq2h for now - DKA vs HHS vs uncontrolled hyperglycemia i/s/o SIRS  - A1c 11.9%   - Endocrine consulted and following  - hyperglycemia >550 -> initiated on Lantus 70units BID (received x1); FSBG downtrended to 300-->127-> then overcorrected to 54mg/dL. Pt was alert and felt jittery, resolved s/p PO Dextrose and juice intake; Per discussion with Endocrinology, Lantus discontinued until reassessment in AM 6/22.   - Anion gap 24 on admission, now closing at 17.  - BHB 0.5--> 0.1  - continue to monitor lytes and FSBG closely  - continue Admelog 10units TIDAC and moderate ISS as d/w Endocrine.

## 2023-06-21 NOTE — H&P ADULT - PROBLEM SELECTOR PLAN 3
- SCr up to 1.86 initial ED labs, improving s/p IVF - now at 1.46  - c/w IVF  - avoid nephrotoxins  - s/p CT dye load in ED, c/w hydration   - Consult Renal if LORA worsens - ACS ruled out - Trop  55->52-->32  - given Asa 325mg PO x1 in ED  - EKG showed sinus tachycardia at 100bpm, TW flattening in inferior and lateral walls. no acute ischemic changes; QTc 539ms.  - Substernal CP positional and pleuritic, a/w SOB  - CTPA in ED w/o PE  (hx PE 04/23, on Eliquis at home)  - Hep gtt here initiated  - satting well on room air  - f/u TTE - expedited  - ESR and CRP elevated  - NPO-midnight for NST vs CCTA in AM to r/o ischemia  - hold any QT prolonging agents - QTc 539ms - Substernal CP positional, pleuritic, and reproducible on exam w/ tenderness to palpation  - ACS ruled out - Trop  55->52-->32  - given Asa 325mg PO x1 in ED  - EKG showed sinus tachycardia at 100bpm, TW flattening in inferior and lateral walls. no acute ischemic changes; QTc 539ms.  - CTPA in ED w/o PE  (hx PE 04/23, on Eliquis at home)  - Hep gtt here initiated  - satting well on room air  - s/p urgent TTE - mild LVH and hyperdynamic LV; c/w dehydration;  no valvulopathy or pericardial effusion  - ESR and CRP elevated  - NPO-midnight for NST vs CCTA in AM to r/o ischemia  - hold any QT prolonging agents - QTc 539ms - repeated hypokalemia despite thorough repletion today, continue to follow BMP q2-4h until stabilizes: next at 10pm  - likely secondary to uncontrolled/labile blood glucose

## 2023-06-21 NOTE — H&P ADULT - CRANIAL NERVE
CN2 affected- decreased visual acuity (reports colored blobs at about 5-7 feet away); CN 3/4/6 intact; CN V pt reports paresthesias ("it feels crunchy" on R V1/2/3; CN7 intact; CN8-12 grossly intact

## 2023-06-21 NOTE — CONSULT NOTE ADULT - NS ATTEND AMEND GEN_ALL_CORE FT
The patient was admitted for asthma and chest pain. Her history is of necrotizing fasciitis of the abdominal wall and she has a suprapubic catheter in -situ, After being given Solumedrol for contrast allergy before CT Scan her glucose zoomed up to 561 this AM. Glucose levels were [phone] last night before Solumedrol After the glucose 561 given Lantus her glucose decreased to 229--122 and then she developed hypoglycemia. I agree with giving her 10 units Lantus tonight and then re-evaluating in the AM.

## 2023-06-21 NOTE — H&P ADULT - PROBLEM SELECTOR PLAN 10
- c/w home labetalol 100mg BID for now  - hypertensive to SBP 150s, continue to monitor    F: NS IVF- s/p 4L in ED/overnight- will reassess further fluids with   E: K>4, Mg>2  N: NPO i/s/o DKA  D: hold home eliquis, c/w Heparin gtt for now - c/w home labetalol 100mg BID for now  - hypertensive to SBP 150s, continue to monitor    F: NS IVF  E: K>4, Mg>2  N: NPO i/s/o DKA  D: hold home eliquis, c/w Heparin gtt for now    case discussed with cardiology attending Dr. Thompson, Medicine comanagment and ICU consult - c/w home labetalol 100mg BID for now  - hypertensive to SBP 150s, continue to monitor    F: NS IVF  E: K>4, Mg>2  N: NPO i/s/o DKA  D: hold home eliquis, c/w Heparin gtt for now    case discussed with cardiology attending Dr. Thompson, Medicine co-managment, ICU & ID consults - CTPA in ED w/o PE  (hx PE 04/23, on Eliquis at home)  - Hep gtt here initiated  - monitor PTT

## 2023-06-21 NOTE — CONSULT NOTE ADULT - ASSESSMENT
43F w/ asthma, PE (on eliquis), HTN, DM, HLD, abdominal nec fasc (s/p suprapubic catheter and ostomy on Nov 2021), and recent admission to [facility] 5/22-6/1 for UTI, presenting with left sided chest pain for 1 day, found to have negative cardiac work-up, persistent hyperglycemia, ICU consulted for transfer to medical telemetry.    A1C: 11.9 %  BUN: 27  Creatinine: 1.46  GFR: 46  Weight: 101.2  BMI: 34.9  EF:  43F w/ asthma, PE (on eliquis), HTN, DM, HLD, abdominal nec fasc (s/p suprapubic catheter and ostomy on Nov 2021), and recent admission to [facility] 5/22-6/1 for UTI, presenting with left sided chest pain for 1 day, found to have negative cardiac work-up, persistent hyperglycemia, ICU consulted for transfer to medical telemetry.    A1C: 11.9 %  BUN: 27  Creatinine: 1.46  GFR: 46  Weight: 101.2  BMI: 34.9

## 2023-06-21 NOTE — PATIENT PROFILE ADULT - FALL HARM RISK - HARM RISK INTERVENTIONS

## 2023-06-21 NOTE — H&P ADULT - NSHPLABSRESULTS_GEN_ALL_CORE
11.3   8.86  )-----------( 363      ( 21 Jun 2023 07:24 )             35.7       06-21    130<L>  |  95<L>  |  27<H>  ----------------------------<  623<HH>  3.7   |  18<L>  |  1.46<H>    Ca    8.3<L>      21 Jun 2023 07:24  Mg     1.9     06-21    TPro  7.2  /  Alb  3.3  /  TBili  0.2  /  DBili  0.2  /  AST  12  /  ALT  9<L>  /  AlkPhos  159<H>  06-21      PT/INR - ( 20 Jun 2023 20:37 )   PT: 13.9 sec;   INR: 1.17          PTT - ( 20 Jun 2023 20:37 )  PTT:31.6 sec    CARDIAC MARKERS ( 21 Jun 2023 07:24 )  x     / x     / 38 U/L / x     / 3.0 ng/mL        Urinalysis Basic - ( 21 Jun 2023 07:24 )    Color: x / Appearance: x / SG: x / pH: x  Gluc: 623 mg/dL / Ketone: x  / Bili: x / Urobili: x   Blood: x / Protein: x / Nitrite: x   Leuk Esterase: x / RBC: x / WBC x   Sq Epi: x / Non Sq Epi: x / Bacteria: x

## 2023-06-21 NOTE — H&P ADULT - PROBLEM SELECTOR PLAN 7
- Ostomy RN consulted, f/u recs - In ED, CT Abdomen without acute findings  - Ostomy RN consulted, f/u recs  - abdomen diffusely tender however nonrigid and nonguarding - c/w serial abd exams  - consider Pain Mgmt consult

## 2023-06-21 NOTE — H&P ADULT - PROBLEM SELECTOR PLAN 12
- Home Losartan is 50mg BID per pt--- on hold due to LORA  - -150s, continue to monitor  - Toprol XL 50mg QHS starting 6/21 PM in advance of CCTA    F: NS IVF  E: K>4, Mg>2  N: s/p NPO, now c/w consistent carb & DASH diet. NPO after midnight for CCTA  D: hold home eliquis, c/w Heparin gtt for now    case discussed with cardiology attending Dr. Thompson, Medicine co-managment, ICU & ID consults

## 2023-06-21 NOTE — CONSULT NOTE ADULT - ASSESSMENT
44 yo female with serial readmissions i/s/o SPT dysfunction among other issues, recent admission for presumed UTI with CRE Klebsiella and E. coli, s/p course of gentamicin -6/5 and ertapenem -6/9 now presenting with chest pain, HTN; no overt urinary symptoms or changes in leakage from around SPT. Elevated WBC count on admission spontaneously improved (without antibiotics). No fevers. Elevated lactate noted (nonspecific) however would be reluctant to classify current presentation as urosepsis.   - f/u bcx 6/20--ngtd  - f/u ucx pending  - /IR reassessment--favor removal of SPT given serial readmissions related to dysfunction--harms of maintaining likely outweigh conceivable benefits   - would observe off antibiotics presently and monitor for signs of emerging sepsis (i.e. fever, rigors, sudden JOSÉ, AMS, decreased urine output)  - if develops above signs, then would be reasonable to trial tigecycline (given recent aminoglycoside and carbapenem exposure)--would dose 100mg IV loading dose X 1, followed by 50mg IV q12h (12h after loading dose)    Dr. Kauffman resumes care tomorrow    ID Team 2

## 2023-06-21 NOTE — ED ADULT NURSE NOTE - BREATHING, MLM
Group Therapy Note    Date: 6/21/2023    Group Start Time: 12:00 PM  Group End Time: 12:50 PM  Group Topic: Psychotherapy    [address] OP    [facility]ellin, [facility]        Group Therapy Note      Focus of session was on identifying core beliefs that we may have that lead us to increased feelings of depression, anxiety, anger, and low self esteem. We spoke about the need to understand our beliefs that lead us to these painful emotions and feeling states. We spoke about how these can be challenged by working to provide pieces of evidence to the contrary to our negative core beliefs. We also spoke about how we can put this into practice when we are out in the world being challenged with negative thinking patterns. Patient's Goal:  1. Dep F 33.1 L. Pt will be able to ask herself when she notices she is struggling 'is this focus or what I am doing helping me or hurting me' and work to change focus if necessary.        Notes: Gaston Baumann participated in group with prompting. She spoke about how she has been worrying about her long term plans and if she should stay here. She spoke about how she will continue to work on her issue with ruminating too much on issues and she can see that she has been able to focus on possible solutions to the problem. Status After Intervention:  Improved    Participation Level:  Active Listener and Interactive    Participation Quality: Appropriate, Attentive, Sharing, and Supportive      Speech:  normal      Thought Process/Content: Logical      Affective Functioning: Congruent      Mood: anxious      Level of consciousness:  Alert, Oriented x4, and Attentive      Response to Learning: Able to verbalize/acknowledge new learning, Able to retain information, and Able to change behavior      Endings: None Reported    Modes of Intervention: Education, Clarifying, and Problem-solving      Discipline Spontaneous, unlabored and symmetrical

## 2023-06-21 NOTE — CONSULT NOTE ADULT - ASSESSMENT
42yo woman w/ hx asthma, hx PE on home Eliquis, DM, HTN, HLD, hx abdominal necrotizing fasciitis s/p suprapubic catheter, ostomy placement in 11/2021 who presented to [facility] ED from home with worsening abdominal and suprapubic pain and drainage from prior suprapubic catheter insertion site, recent admission 5/23-6/1 for sepsis 2/2 UTI and catheter-site infection on gentamicin and ertapenem, who presents for acute chest pain w/ elevated troponins, medicine team consulted for management of severe sepsis and hyperglycemia. 44yo woman w/ hx asthma, hx PE on home Eliquis, DM, HTN, HLD, hx abdominal necrotizing fasciitis s/p suprapubic catheter, ostomy placement in 11/2021 who presented to [facility] ED from home with worsening abdominal and suprapubic pain and drainage from prior suprapubic catheter insertion site, recent admission 5/23-6/1 for sepsis 2/2 UTI and catheter-site infection on gentamicin and ertapenem, who presents for acute chest pain w/ elevated troponins, medicine team consulted for management of severe sepsis and hyperglycemia.    Plan/Recommendations:  #Severe sepsis  On prior admission 5-23-6/1 pt had presented with worsening lower abd pain, drainage from suprapubic catheter site noted in setting of elevated lactate 5.3 in setting of severe sepsis 2/2 UTI from suprapubic catheter. At that time, UCx (5/22) growing carbapenem-resistant Klebsiella & E. Coli. Blood cultures (5/23) NGTD. S/p suprapubic catheter replacement by IR on 5/25 and since treated w/ course gentamicin and ertapenem via PICC on discharge. On current admission, patient presenting with persistent pain symptoms 44yo woman w/ hx asthma, hx PE on home Eliquis, DM, HTN, HLD, hx abdominal necrotizing fasciitis s/p suprapubic catheter, ostomy placement in 11/2021 who presented to [facility] ED from home with worsening abdominal and suprapubic pain and drainage from prior suprapubic catheter insertion site, recent admission 5/23-6/1 for sepsis 2/2 UTI and catheter-site infection on gentamicin and ertapenem, who presents for acute chest pain w/ elevated troponins, medicine team consulted for management of severe sepsis and hyperglycemia.    Plan/Recommendations:  #Severe sepsis  On prior admission 5-23-6/1 pt had presented with worsening lower abd pain, drainage from suprapubic catheter site noted in setting of elevated lactate 5.3 in setting of severe sepsis 2/2 UTI from suprapubic catheter. At that time, UCx (5/22) growing carbapenem-resistant Klebsiella & E. Coli. Blood cultures (5/23) NGTD. S/p suprapubic catheter replacement by IR on 5/25 and since treated w/ course gentamicin and ertapenem via PICC on discharge. On current admission, patient presenting with persistent pain symptoms, lactate 7.3 on admission improved w/ IVF since elevated again back up to 7.4.  - recommend continued IVF resuscitation 30cc/kg/min per sepsis bundle  - agree w/ empiric dose meropenem given prior hx MDRO in setting of prior sepsis 2/2 UTI  - f/u BCx x2 and UCx x1  - appreciate ID recs    #Poorly-controlled IDDM  A1c 11.9, noted on admission for FSGs in 500s.  - c/w home dose of Humulin U-500 100 units three times daily  - appreciate endocrine recs    #R/O NSTEMI  Patient presenting w/ acute substernal chest pain w/ mildly elevated high sensitivity troponins mid 50s. Otherwise no ST changes noted on ECG.  - agree with admission to cardiac telemetry for possible ischemic eval  - f/u TTE    #Suprapubic/Abd pain  - agree with IR consult to evaluate correct placement of suprapubic urinary catheter  - agree w/ oxybutynin 5mg TID PRN for bladder spasms  - c/w prior pain regimen: Tylenol 975mg q8hrs, Ibuprofen 200mg q6hrs, Oxycodone IR PRN (moderate 5mg q6h, severe 10mg q4h), lidocaine patch for R midback paraspinal tenderness    #Hx necrotizing fasciitis s/p ostomy  Hx of necrotizing fasciitis tx at [facility] in 11/2021 with hospital course c/b bowel resection and ostomy formation. On ROS, no increased output from ostomy bag and site appears clean/dry.  - Routine ostomy care  - Monitor ostomy output  - General surgery outpatient appointment for possible ostomy reversal.    #HTN  Home medications include Labetalol 100 mg BID and Losartan 50mg Q24.  - agree with holding home meds for now in setting of severe sepsis    #Hx Pulmonary embolism  History of bilateral pulmonary emboli in segmental and subsegmental branches on the R and subsegmental branches on the L, diagnosed on CT chest during ED visit on 4/7/23. Home meds: eliquis 5mg BID  - c/w eliquis 5mg BID    #Asthma  - Continue home Ventolin Q24 PRN 42yo woman w/ hx asthma, hx PE on home Eliquis, DM, HTN, HLD, hx abdominal necrotizing fasciitis s/p suprapubic catheter, ostomy placement in 11/2021 who presented to [facility] ED from home with worsening abdominal and suprapubic pain and drainage from prior suprapubic catheter insertion site, recent admission 5/23-6/1 for sepsis 2/2 UTI and catheter-site infection on gentamicin and ertapenem, who presents for acute chest pain w/ elevated troponins, medicine team consulted for management of severe sepsis and hyperglycemia.    Plan/Recommendations:  #Severe sepsis  On prior admission 5-23-6/1 pt had presented with worsening lower abd pain, drainage from suprapubic catheter site noted in setting of elevated lactate 5.3 in setting of severe sepsis 2/2 UTI from suprapubic catheter. At that time, UCx (5/22) growing carbapenem-resistant Klebsiella & E. Coli. Blood cultures (5/23) NGTD. S/p suprapubic catheter replacement by IR on 5/25 and since treated w/ course gentamicin and ertapenem via PICC on discharge. On current admission, patient presenting with persistent pain symptoms, lactate 7.3 on admission improved w/ IVF since elevated again back up to 7.4.  - recommend continued IVF resuscitation 30cc/kg/min per sepsis bundle  - agree w/ empiric dose meropenem given prior hx MDRO in setting of prior sepsis 2/2 UTI  - trend lactate q6hrs to clear  - f/u BCx x2 and UCx x1  - appreciate ID recs    #Poorly-controlled IDDM  A1c 11.9, noted on admission for FSGs in 500s.  - c/w home dose of Humulin U-500 100 units three times daily  - appreciate endocrine recs    #R/O NSTEMI  Patient presenting w/ acute substernal chest pain w/ mildly elevated high sensitivity troponins mid 50s. Otherwise no ST changes noted on ECG.  - agree with admission to cardiac telemetry for possible ischemic eval  - f/u TTE    #Suprapubic/Abd pain  - agree with IR consult to evaluate correct placement of suprapubic urinary catheter  - agree w/ oxybutynin 5mg TID PRN for bladder spasms  - c/w prior pain regimen: Tylenol 975mg q8hrs, Ibuprofen 200mg q6hrs, Oxycodone IR PRN (moderate 5mg q6h, severe 10mg q4h), lidocaine patch for R midback paraspinal tenderness    #Hx necrotizing fasciitis s/p ostomy  Hx of necrotizing fasciitis tx at [facility] in 11/2021 with hospital course c/b bowel resection and ostomy formation. On ROS, no increased output from ostomy bag and site appears clean/dry.  - Routine ostomy care  - Monitor ostomy output  - General surgery outpatient appointment for possible ostomy reversal.    #HTN  Home medications include Labetalol 100 mg BID and Losartan 50mg Q24.  - agree with holding home meds for now in setting of severe sepsis    #Hx Pulmonary embolism  History of bilateral pulmonary emboli in segmental and subsegmental branches on the R and subsegmental branches on the L, diagnosed on CT chest during ED visit on 4/7/23. Home meds: eliquis 5mg BID  - c/w eliquis 5mg BID    #Asthma  - Continue home Ventolin Q24 PRN

## 2023-06-21 NOTE — H&P ADULT - MUSCULOSKELETAL COMMENTS
chest wall tenderness to moderate palpation to sternal area; tender to light touch sensation b/l LE and b/l calf tenderness

## 2023-06-21 NOTE — CONSULT NOTE ADULT - SUBJECTIVE AND OBJECTIVE BOX
HISTORY OF PRESENT ILLNESS  43F w/ asthma, PE (on eliquis), HTN, DM, HLD, abdominal nec fasc (s/p suprapubic catheter and ostomy on Nov 2021), and recent admission to [facility] 5/22-6/1 for UTI, presenting with left sided chest pain for 1 day. Describes as pressure-like to left chest and under left breast, and associated short of breath with exertion. Complains of no appetite for 2 days. Reports good medication compliance. Denies fevers, cough, N/V, abd pain, recent travel.     In the ED:  Initial vital signs: T: 98.7 F, HR: 135, BP: 135/90, R: 18, SpO2: 96% on RA  Labs: significant for WBC 16.8, plts 589, K 2.9, bicarb 23, Cr 1.86, Glc 194  Imaging:  CXR: No acute infiltrates  EKG: NSR, no ischemic changes, Qtc 539  Medications: NS 3L, methylpred 40 IV, KCl 40 PO, KCl 10x3 IV, Mag 2g IV  Consults: None    CVS:  #Chest pain  - Troponins negative, EKG NSR, no ischemic changes    #Elevated lactate  - Lactate 7 on admission, improved to 3.5 s/p 3L NS  - Patient not acidotic, normal bicarb and normal AG  - Likely 2/2 poorly controlled DM and dehydration, less likely caused by severe sepsis, as no clear source of infection    #H/o UTI  - Prior admission 5/22-6/1 for UTI of suprapubic catheter  - ESBL Klebsiella  - F/u UCx    CAPILLARY BLOOD GLUCOSE & INSULIN RECEIVED  234 mg/dL (06-20 @ 19:30)  194 mg/dL (06-20 @ 20:37)  129 mg/dL (06-20 @ 23:31)  550 mg/dL (06-21 @ 06:49)  552 mg/dL (06-21 @ 06:53)  623 mg/dL (06-21 @ 07:24)  561 mg/dL (06-21 @ 07:42)  547 mg/dL (06-21 @ 08:44)      DIABETES HISTORY  Endocrine seen during last admission March 2023. During that admission Lantus 60 units at bedtime and Lantus 30 units in the AM, and lispro  to 36 units before each meal. Hyperglycemic to 200s on this regimen.  At discharge it was recommending her to continue her home Humulin U500 160 units TID and take admelog if PP FSG >200, and then take 35 units. Dr Joseph is her endocrinologist, she has not followed up with her since last discharge.  Since discharge she has been taking Humulin U500 160 unit TID. She frequently has fasting FSG in 500s and 600s. She takes U500 and waits 1-2 hours. If FSG is >235 after that time she takes admelog 35. She reports having to take admelog approx 3 x/week.      Last admission in June she was requiring Lantus 80 BID and lispro 30 TID. Infected during that admission.  Discharged home back on U500 100 units TID  Dr Joseph Feb 2023 - U500 125 TID + admelog 30 with meals    DIABETES HISTORY  T2DM history:  -Dx in 2005.   -2007: started metformin 1000 mg BID.   -2009: was pregnant and started on glyburide- did not require insulin for pregnancy.   - October 2020: began seeing Dr. Joseph. Eventually, insulin regimen changed to U500  -During a previous admission in July 2021, she was receiving Lantus 75u twice daily and Lispro 40-65 units with each meal.   -last recorded recommended outpatient regimen (clinic note Feb 2023): U500 Humulin R 100 units TID with meals. If glucose levels do not improve, add Admelog 30 units TID with meals. Prior to admission.     PAST MEDICAL & SURGICAL HISTORY  As per history of present illness.     FAMILY HISTORY  - Diabetes:  - Thyroid:  - Autoimmune:  - Other:    SOCIAL HISTORY  - Work:  - Alcohol:  - Smoking:  - Recreational Drugs:    ALLERGIES  shellfish. (Hives; Anaphylaxis)  iodine containing compounds (Hives; Anaphylaxis)  fish (Hives; Urticaria)  clindamycin (Pruritus)  vancomycin (Anaphylaxis; Hives)  penicillin (Hives)  amoxicillin (Unknown)  Bactrim I.V. (Rash (Mod to Severe))    CURRENT MEDICATIONS  acetaminophen     Tablet .. 650 milliGRAM(s) Oral every 6 hours PRN  aspirin enteric coated 81 milliGRAM(s) Oral daily  dextrose 5%. 1000 milliLiter(s) IV Continuous <Continuous>  dextrose 5%. 1000 milliLiter(s) IV Continuous <Continuous>  dextrose 50% Injectable 12.5 Gram(s) IV Push once  dextrose 50% Injectable 25 Gram(s) IV Push once  dextrose 50% Injectable 25 Gram(s) IV Push once  dextrose Oral Gel 15 Gram(s) Oral once PRN  glucagon  Injectable 1 milliGRAM(s) IntraMuscular once  heparin  Infusion.  Unit(s)/Hr IV Continuous <Continuous>  insulin glargine Injectable (LANTUS) 70 Unit(s) SubCutaneous <User Schedule>  insulin lispro (ADMELOG) corrective regimen sliding scale   SubCutaneous Before meals and at bedtime  insulin lispro Injectable (ADMELOG) 10 Unit(s) SubCutaneous three times a day before meals  ipratropium    for Nebulization 500 MICROGram(s) Nebulizer every 6 hours PRN  labetalol 100 milliGRAM(s) Oral two times a day  oxybutynin 5 milliGRAM(s) Oral three times a day  oxyCODONE    IR 5 milliGRAM(s) Oral every 8 hours PRN  sodium chloride 0.9%. 1000 milliLiter(s) IV Continuous <Continuous>    REVIEW OF SYSTEMS  Constitutional:  Negative fever, chills or loss of appetite.  Eyes:  Negative blurry vision or double vision.  Cardiovascular:  Negative for chest pain or palpitations.  Respiratory:  Negative for cough, wheezing, or shortness of breath.   Gastrointestinal:  Negative for nausea, vomiting, diarrhea, constipation, or abdominal pain.  Genitourinary:  Negative frequency, urgency or dysuria.  Neurologic:  No headache, confusion, dizziness, lightheadedness.    PHYSICAL EXAM  Vital Signs Last 24 Hrs  T(C): 36.6 (21 Jun 2023 08:47), Max: 37.1 (20 Jun 2023 19:28)  T(F): 97.9 (21 Jun 2023 08:47), Max: 98.7 (20 Jun 2023 19:28)  HR: 102 (21 Jun 2023 07:44) (98 - 135)  BP: 156/80 (21 Jun 2023 07:44) (135/90 - 156/80)  BP(mean): --  RR: 17 (21 Jun 2023 07:44) (16 - 18)  SpO2: 98% (21 Jun 2023 07:44) (94% - 98%)    Parameters below as of 21 Jun 2023 07:44  Patient On (Oxygen Delivery Method): room air    Constitutional: Awake, alert, in no acute distress.   HEENT: Normocephalic, atraumatic, NAOMI, no proptosis or lid retraction.   Neck: supple, no acanthosis, no thyromegaly or palpable thyroid nodules.  Respiratory: Lungs clear to ausculation bilaterally.   Cardiovascular: regular rhythm, normal S1 and S2, no audible murmurs.   GI: soft, non-tender, non-distended, bowel sounds present, no masses appreciated.  Extremities: No lower extremity edema, peripheral pulses present.   Skin: no rashes.   Psychiatric: AAO x 3. Normal affect/mood.     LABS  CBC - WBC/HGB/HTC/PLT: 8.86/11.3/35.7/363 (06-21-23)  BMP: Na/K/Cl/Bicarb/BUN/Cr/Gluc: 130/3.7/95/18/27/1.46/623 (06-21-23)  Anion Gap: 17 (06-21-23)  eGFR: 46 (06-21-23)  Calcium: 8.3 (06-21-23)  Phosphorus: -- (06-21-23)  Magnesium: 1.9 (06-21-23)  LFT - Alb/Tprot/Tbili/Dbili/AlkPhos/ALT/AST: 3.3/--/0.2/0.2/159/9/12 (06-21-23)  PT/aPTT/INR: 13.9/31.6/1.17 (06-20-23)  Thyroid Stimulating Hormone, Serum: 0.851 (04-28-23)              HISTORY OF PRESENT ILLNESS  43F w/ asthma, PE (on eliquis), HTN, DM, HLD, abdominal nec fasc (s/p suprapubic catheter and ostomy on Nov 2021), and recent admission to [facility] 5/22-6/1 for UTI, presenting with left sided chest pain for 1 day. Describes as pressure-like to left chest and under left breast, and associated short of breath with exertion. Complains of no appetite for 2 days. Reports good medication compliance. Denies fevers, cough, N/V, abd pain, recent travel.     In the ED:  Initial vital signs: T: 98.7 F, HR: 135, BP: 135/90, R: 18, SpO2: 96% on RA  Labs: significant for WBC 16.8, plts 589, K 2.9, bicarb 23, Cr 1.86, Glc 194  Imaging:  CXR: No acute infiltrates  EKG: NSR, no ischemic changes, Qtc 539  Medications: NS 3L, methylpred 40 IV, KCl 40 PO, KCl 10x3 IV, Mag 2g IV  - Troponins negative, EKG NSR, no ischemic changes  Elevated lactate  - Lactate 7 on admission, improved to 3.5 s/p 3L NS  - Patient not acidotic, normal bicarb and normal AG  - Unable to clear throughout the day.  H/o UTI  - Prior admission 5/22-6/1 for UTI of suprapubic catheter  - ESBL Klebsiella  - F/u UCx    Endocrine consulted for diabetes management. Pt is on high doses of insulin at home with labile BG.  She received solumedrol last night for contrast allergy, and glucose in 600s this morning. She was given lantus 70 this AM, which is dose from previous admission, and became hypoglycemic by afternoon. She is eating very little. Reports nausea, abdominal pain, and headache.    CAPILLARY BLOOD GLUCOSE & INSULIN RECEIVED  234 mg/dL (06-20 @ 19:30)  194 mg/dL (06-20 @ 20:37)  129 mg/dL (06-20 @ 23:31)  550 mg/dL (06-21 @ 06:49)  552 mg/dL (06-21 @ 06:53) - Lispro 12  623 mg/dL (06-21 @ 07:24)  561 mg/dL (06-21 @ 07:42)  547 mg/dL (06-21 @ 08:44)  Lantus 70 given at 10AM  449 mg/dL (06-21 @ 11:00) - Lispro 12  127 mg/dL (06-21 @ 13:00)  57 mg/dL (06-21 @ 4PM_ --> treated to 115    DIABETES HISTORY  Known to service from multiple admission.  Last admission in June she was requiring Lantus 80 BID and lispro 30 TID. Infected during that admission.  Discharged home back on U500 100 units TID  She reports currently taking U500 80 units TID and admelog 45 units before meals.  Dr Joseph is her endocrinologist, she has not followed up with her since last discharge.    DIABETES HISTORY  T2DM history:  -Dx in 2005.   -2007: started metformin 1000 mg BID.   -2009: was pregnant and started on glyburide- did not require insulin for pregnancy.   - October 2020: began seeing Dr. Joseph. Eventually, insulin regimen changed to U500  -During a previous admission in July 2021, she was receiving Lantus 75u twice daily and Lispro 40-65 units with each meal.   -last recorded recommended outpatient regimen (clinic note Feb 2023): U500 Humulin R 100 units TID with meals. If glucose levels do not improve, add Admelog 30 units TID with meals. Prior to admission.     PAST MEDICAL & SURGICAL HISTORY  As per history of present illness.     ALLERGIES  shellfish. (Hives; Anaphylaxis)  iodine containing compounds (Hives; Anaphylaxis)  fish (Hives; Urticaria)  clindamycin (Pruritus)  vancomycin (Anaphylaxis; Hives)  penicillin (Hives)  amoxicillin (Unknown)  Bactrim I.V. (Rash (Mod to Severe))    CURRENT MEDICATIONS  acetaminophen     Tablet .. 650 milliGRAM(s) Oral every 6 hours PRN  aspirin enteric coated 81 milliGRAM(s) Oral daily  dextrose 5%. 1000 milliLiter(s) IV Continuous <Continuous>  dextrose 5%. 1000 milliLiter(s) IV Continuous <Continuous>  dextrose 50% Injectable 12.5 Gram(s) IV Push once  dextrose 50% Injectable 25 Gram(s) IV Push once  dextrose 50% Injectable 25 Gram(s) IV Push once  dextrose Oral Gel 15 Gram(s) Oral once PRN  glucagon  Injectable 1 milliGRAM(s) IntraMuscular once  heparin  Infusion.  Unit(s)/Hr IV Continuous <Continuous>  insulin glargine Injectable (LANTUS) 70 Unit(s) SubCutaneous <User Schedule>  insulin lispro (ADMELOG) corrective regimen sliding scale   SubCutaneous Before meals and at bedtime  insulin lispro Injectable (ADMELOG) 10 Unit(s) SubCutaneous three times a day before meals  ipratropium    for Nebulization 500 MICROGram(s) Nebulizer every 6 hours PRN  labetalol 100 milliGRAM(s) Oral two times a day  oxybutynin 5 milliGRAM(s) Oral three times a day  oxyCODONE    IR 5 milliGRAM(s) Oral every 8 hours PRN  sodium chloride 0.9%. 1000 milliLiter(s) IV Continuous <Continuous>    REVIEW OF SYSTEMS  Constitutional:  (+) loss of appetite. Negative fever, chills.   Cardiovascular:  Negative for chest pain or palpitations.  Respiratory:  Negative for cough, wheezing, or shortness of breath.    Gastrointestinal:  (+) Nausea. Negative for vomiting, diarrhea, constipation, or abdominal pain.  Genitourinary:  (+) Suprapubic pain.  Neurologic:  No headache, confusion, dizziness, lightheadedness.      PHYSICAL EXAM  Vital Signs Last 24 Hrs  T(C): 36.6 (21 Jun 2023 08:47), Max: 37.1 (20 Jun 2023 19:28)  T(F): 97.9 (21 Jun 2023 08:47), Max: 98.7 (20 Jun 2023 19:28)  HR: 102 (21 Jun 2023 07:44) (98 - 135)  BP: 156/80 (21 Jun 2023 07:44) (135/90 - 156/80)  BP(mean): --  RR: 17 (21 Jun 2023 07:44) (16 - 18)  SpO2: 98% (21 Jun 2023 07:44) (94% - 98%)    Parameters below as of 21 Jun 2023 07:44  Patient On (Oxygen Delivery Method): room air    Constitutional: Awake, alert, obese female, in no acute distress.   HEENT: Normocephalic, atraumatic, NAOMI.  Respiratory: Lungs clear to ausculation bilaterally.   Cardiovascular: regular rhythm, normal S1 and S2, no audible murmurs.   GI: soft, non-tender, non-distended, bowel sounds present. (+) Ostomy bag in place.   : (+) Suprapubic catheter.   Extremities: No lower extremity edema.  Psychiatric: AAO x 3. Normal affect/mood.     LABS  CBC - WBC/HGB/HTC/PLT: 8.86/11.3/35.7/363 (06-21-23)  BMP: Na/K/Cl/Bicarb/BUN/Cr/Gluc: 130/3.7/95/18/27/1.46/623 (06-21-23)  Anion Gap: 17 (06-21-23)  eGFR: 46 (06-21-23)  Calcium: 8.3 (06-21-23)  Phosphorus: -- (06-21-23)  Magnesium: 1.9 (06-21-23)  LFT - Alb/Tprot/Tbili/Dbili/AlkPhos/ALT/AST: 3.3/--/0.2/0.2/159/9/12 (06-21-23)  PT/aPTT/INR: 13.9/31.6/1.17 (06-20-23)  Thyroid Stimulating Hormone, Serum: 0.851 (04-28-23)

## 2023-06-21 NOTE — H&P ADULT - PROBLEM SELECTOR PROBLEM 5
History of creation of ostomy History of suprapubic catheter Abdominal pain LORA (acute kidney injury)

## 2023-06-21 NOTE — CONSULT NOTE ADULT - ASSESSMENT
43F w/ asthma, PE (on eliquis), HTN, DM, HLD, abdominal nec fasc (s/p suprapubic catheter and ostomy), and recent admission to [facility] 5/22-6/1 for UTI, presenting with left sided chest pain for 1 day. Describes as pressure-like to left chest and under left breast, and associated short of breath with exertion. Complains of no appetite for 2 days. Reports good medication compliance. Denies fevers, cough, N/V, abd pain, recent travel.     Neuro:  AAOx4    CVS:  #Chest pain  - Troponins negative, EKG NSR, no ischemic changes    #Elevated lactate  - Lactate 7 on admission, improved to 3.5 s/p 3L NS  - Patient not acidotic, normal bicarb and normal AG  - Likely 2/2 poorly controlled DM and dehydration, less likely caused by severe sepsis, as no clear source of infection  - Trend to clear    Pulm:  DEJON    GI:  #H/o abdominal necrotizing fascititis    /Renal:  #H/o Suprapubic catheter    #H/o UTI  - Prior admission 5/22-6/1 for UTI of suprapubic catheter  - ESBL Klebsiella  - F/u UCx    Heme:  #Leukocytosis  - WBC 16 in ED  - Resolved on repeat CBC  - No fever, tachycardia, tachypnea  - Likely reactive 2/2 pain, less likely active infection  - Methylpred 140 in ED administered after initial leukocytosis recorded  - F/u BCx and UCx    #Thrombocythemia  - Plts 500s    Endo:  #Hyperglycemia  - Home insulin regimen: regular insulin 100 TID  - Insulin regimen last admission: lantus 70 BID, lispro 20 TID  - Normal anion gap, BHB 0.5, not in DKA  - C/w lantus 70 BID  - Endocrine consulted, following    #DM2  - Poorly controlled  - Most recent A1c 12  - Home insulin regimen: regular insulin 100 TID  - Treatment of Hyperglycemia as above    ID:    Dispo: 43F w/ asthma, PE (on eliquis), HTN, DM, HLD, abdominal nec fasc (s/p suprapubic catheter and ostomy on Nov 2021), and recent admission to [facility] 5/22-6/1 for UTI, presenting with left sided chest pain for 1 day, found to have negative cardiac work-up, persistent hyperglycemia, ICU consulted for transfer to medical telemetry.    Neuro:  AAOx4    CVS:  #Chest pain  - Troponins negative, EKG NSR, no ischemic changes    #Elevated lactate  - Lactate 7 on admission, improved to 3.5 s/p 3L NS  - Patient not acidotic, normal bicarb and normal AG  - Likely 2/2 poorly controlled DM and dehydration, less likely caused by severe sepsis, as no clear source of infection  - Trend to clear    Pulm:  DEJON    GI:  #H/o abdominal necrotizing fascititis    /Renal:  #H/o Suprapubic catheter    #H/o UTI  - Prior admission 5/22-6/1 for UTI of suprapubic catheter  - ESBL Klebsiella  - F/u UCx    Heme:  #Leukocytosis  - WBC 16 in ED  - Resolved on repeat CBC  - No fever, tachycardia, tachypnea  - Likely reactive 2/2 pain, less likely active infection  - Methylpred 140 in ED administered after initial leukocytosis recorded  - F/u BCx and UCx    #Thrombocythemia  - Plts 500s    Endo:  #Hyperglycemia  - Home insulin regimen: regular insulin 100 TID  - Insulin regimen last admission: lantus 70 BID, lispro 20 TID  - Normal anion gap, BHB 0.5, not in DKA  - C/w lantus 70 BID  - Endocrine consulted, following    #DM2  - Poorly controlled  - Most recent A1c 12  - Home insulin regimen: regular insulin 100 TID  - Treatment of Hyperglycemia as above    ID:    Dispo: 43F w/ asthma, PE (on eliquis), HTN, DM, HLD, abdominal nec fasc (s/p suprapubic catheter and ostomy on Nov 2021), and recent admission to [facility] 5/22-6/1 for ESBL E. coli and Klebsiella UTI, presenting with left sided chest pain for 1 day, found to have negative cardiac work-up, persistent hyperglycemia, ICU consulted for transfer to medical telemetry.    Neuro:  AAOx4    CVS:  #Chest pain  - Troponins negative, EKG NSR, no ischemic changes  - CT PE without PE or acute abnormalities  - CT A/P normal  - Possible costochondritis pain    #Elevated lactate  - Lactate 7 on admission, improved to 3.5 s/p 3L NS  - Patient not acidotic, normal bicarb and normal AG  - Likely 2/2 poorly controlled DM and dehydration, less likely caused by severe sepsis, as no clear source of infection  - Trend to clear    Pulm:  #Asthma  - Home inhalers: albuterol PRN  - Given methylpred 40 in ED  - PRN duonebs wheezing and/or shortness of breath    GI:  #H/o abdominal necrotizing fasciitis  - S/p surgery with colostomy in Nov 2021  - Wound care ostomy following  - Chronic abdominal pain stable  - S/p dilaudid 0.5 x4 in ED  - C/w dilaudid PRN severe pain    /Renal:  #H/o Suprapubic catheter  - Thin liquid discharge, non-purulent  - Less likely infected    #H/o UTI  - Prior admission 5/22-6/1 for UTI of suprapubic catheter  - ESBL Klebsiella and E. Coli  - S/p 14 day course of gentamicin and ertapenem  - F/u UCx  - Consider ID consult if become septic    Heme:  #Leukocytosis  - WBC 16 in ED  - Resolved on repeat CBC  - No fever, tachycardia, tachypnea  - Likely reactive 2/2 pain, less likely active infection  - Methylpred 140 in ED administered after initial leukocytosis recorded  - F/u BCx and UCx    Endo:  #Hyperglycemia  - Home insulin regimen: regular insulin 80 TID, lispro 45 TID  - Insulin regimen last admission: lantus 70 BID, lispro 20 TID  - Normal anion gap, BHB 0.5, not in DKA  - C/w lantus 70 BID  - Endocrine consulted, following    #DM2  - Poorly controlled  - Most recent A1c 12  - Home insulin regimen: regular insulin 80 TID, lispro 45 TID  - Treatment of Hyperglycemia as above    ID:  #H/o MDRO UTIs  - S/p 14-day course gentamicin and ertapenem    Dispo: 43F w/ HTN, DM2 (A1c 12), HLD, asthma, PE (on eliquis), abdominal nec fasc (s/p suprapubic catheter and ostomy in Nov 2021 at ), frequent UTIs, and recent admission to [facility] 5/22-6/1 for UTI, presenting with 1-day history of left sided chest pain and shortness of breath with exertion., found to have negative cardiac work-up, persistent hyperglycemia, ICU consulted for transfer to medical telemetry.    Neuro:  AAOx4    CVS:  #Chest pain  - Troponins negative, EKG NSR, no ischemic changes  - CT PE without PE or acute abnormalities  - CT A/P normal  - Possible costochondritis pain    #Elevated lactate  - Lactate 7 on admission, improved to 3.5 s/p 3L NS  - Patient not acidotic, normal bicarb and normal AG  - Likely 2/2 poorly controlled DM and dehydration, less likely caused by severe sepsis, as no clear source of infection  - Trend to clear    Pulm:  #Asthma  - Home inhalers: albuterol PRN  - Given methylpred 40 in ED  - PRN duonebs wheezing and/or shortness of breath    GI:  #H/o abdominal necrotizing fasciitis  - S/p surgery with colostomy in Nov 2021  - Wound care ostomy following  - Chronic abdominal pain stable  - S/p dilaudid 0.5 x4 in ED  - C/w dilaudid PRN severe pain    /Renal:  #H/o Suprapubic catheter  - Thin liquid discharge, non-purulent  - Less likely infected    #H/o UTI  - Prior admission 5/22-6/1 for UTI of suprapubic catheter  - ESBL Klebsiella and E. Coli  - S/p 14 day course of gentamicin and ertapenem  - F/u UCx  - Consider ID consult if become septic    Heme:  #Leukocytosis  - WBC 16 in ED  - Resolved on repeat CBC  - No fever, tachycardia, tachypnea  - Likely reactive 2/2 pain, less likely active infection  - Methylpred 140 in ED administered after initial leukocytosis recorded  - F/u BCx and UCx    Endo:  #Hyperglycemia  - Home insulin regimen: regular insulin 80 TID, lispro 45 TID  - Insulin regimen last admission: lantus 70 BID, lispro 20 TID  - Normal anion gap, BHB 0.5, not in DKA  - C/w lantus 70 BID  - Endocrine consulted, following    #DM2  - Poorly controlled  - Most recent A1c 12  - Home insulin regimen: regular insulin 80 TID, lispro 45 TID  - Treatment of Hyperglycemia as above    ID:  #H/o MDRO UTIs  - S/p 14-day course gentamicin and ertapenem    Dispo:

## 2023-06-21 NOTE — H&P ADULT - ASSESSMENT
42 y/o F PMHx Asthma, CVA (11/2021), PE (on Eliquis last dose AM 6/20/23), DM2, HTN, HLD, hx abdominal necrotizing fasciitis (s/p suprapubic catheter, ostomy placement in 11/2021), s/p intubation,  hx of frequent UTIs 2/2 to suprapubic catheter, most recent UTI c/b Urosepsis admitted [facility] 5/23-6/1 who presents to [facility] ED on 6/20/23, complaining of abdominal pain, nausea, chest pressure, SOB, and blurry vision x3 days.  The chest pressure is substernal with left radiation, intermittent, exertional, pleuritic and positional in nature. It began 3 days ago while she was standing up; it is worse with standing or sitting upright, and relieved by reclining. Pt says it causes her to have shallow rapid breathing because deep breaths hurt worse. Pt reports diffuse body pain from abdomen to b/l LE. Pt's abdominal pain is crampier than usual and she reports a change to her ostomy drainage yesterday ("it was mush").  The abdominal pain is crampy in nature, predominantly in b/l lower quadrants, and wraps around to the back, and associated with nausea. Pt's blurred vision is worse R>L eye; she reports "seeing colored blobs" starting at about 5 feet away. Pt also reports b/l leg weakness  (L>R), paresthesias, and pain chronically since her stroke however worsening recently. Pt admitted initially to cardiology for R/o ACS, which has been ruled out, and transferring to regional medicine floor for further infectious workup and hyperglycemia management.  44 y/o F PMHx Asthma, CVA (11/2021), PE (on Eliquis last dose AM 6/20/23), DM2, HTN, HLD, hx abdominal necrotizing fasciitis (s/p suprapubic catheter, ostomy placement in 11/2021), s/p intubation,  hx of frequent UTIs 2/2 to suprapubic catheter, most recent UTI c/b Urosepsis admitted [facility] 5/23-6/1 who presents to [facility] ED on 6/20/23, complaining of abdominal pain, nausea, chest pressure, SOB, and blurry vision x3 days,  found to be hyperglycemic to 600s, admitted initially to cardiology for R/o ACS, which has been ruled out, and transferring to regional medicine floor for further infectious workup and hyperglycemia management.  44 y/o F PMHx Asthma, CVA (11/2021), PE (on Eliquis last dose AM 6/20/23), DM2, HTN, HLD, hx abdominal necrotizing fasciitis (s/p suprapubic catheter, ostomy placement in 11/2021), s/p intubation,  hx of frequent UTIs 2/2 to suprapubic catheter, most recent UTI c/b Urosepsis admitted [facility] 5/23-6/1 who presents to [facility] ED on 6/20/23, complaining of abdominal pain, nausea, chest pressure, SOB, and blurry vision x3 days,  found to be hyperglycemic to 600s, admitted initially to cardiology for R/o ACS, Endocrine consulted for DKA and Medicine co-managing for further infectious management.  42 y/o F PMHx Asthma, CVA (11/2021), PE (on Eliquis last dose AM 6/20/23), DM2, HTN, HLD, hx abdominal necrotizing fasciitis (s/p suprapubic catheter, ostomy placement in 11/2021), s/p intubation,  hx of frequent UTIs 2/2 to suprapubic catheter, most recent UTI c/b Urosepsis admitted [facility] 5/23-6/1 who presents to [facility] ED on 6/20/23, complaining of abdominal pain, nausea, chest pressure, SOB, blurry vision, and anorexia x3 days, found to be hyperglycemic to 600s, admitted to cardiology for R/o ACS, Endocrine consulted for hyperglycemia and Medicine co-managing for further medical management.  44 y/o female with PMHx Asthma, CVA (11/2021; residual L>R weakness), PE (4/2023 on Eliquis), uncontrolled DM-2 (on insulin), HTN, HLD, hx abdominal necrotizing fasciitis (s/p suprapubic catheter, ostomy placement in 11/2021 with intubation from 11-12/2021), hx of frequent UTIs 2/2 to suprapubic catheter, most recent UTI c/b Urosepsis admitted [facility] 5/23-6/1 (Klebsiella and Ecoli, dc'd with left midline for tx w/ abx)  who presents to [facility] ED on 6/20/23, complaining of abdominal pain, nausea, chest pressure, SOB, blurry vision, and anorexia x3 days, found to be hyperglycemic to 600s, admitted to cardiology for R/o ACS, Endocrine consulted for hyperglycemia, Medicine and ID co-managing for management of SIRS.

## 2023-06-21 NOTE — PATIENT PROFILE ADULT - SURGICAL SITE DESCRIPTION
12 yr old 2 weeks with intermintent fever and blood work (cbc and ebv neg) and peds ID winthrop, and + lower abd discomfort. ongoing fever adn further blood work.
ostomy

## 2023-06-21 NOTE — H&P ADULT - PROBLEM SELECTOR PLAN 9
- persistent L>R UE and LE weakness and paresthesias  - s/p dilaudid in ED and home dilaudid, however hold due to prolonged QTc  - Home gabapentin 600mg TID --> c/w renally dosed BID 600mg for now  - home oxycodone 10mg from last admission --> renally dosed at 5mg q8h for now, can uptitrate as needed - Ostomy RN consulted, f/u recs

## 2023-06-21 NOTE — CONSULT NOTE ADULT - PROBLEM SELECTOR RECOMMENDATION 9
Type 2 diabetes mellitus with hyperglycemia  - Please continue lantus *** units at bedtime.   - Continue lispro *** units before each meal.  - Continue lispro moderate / low dose sliding scale before meals and at bedtime.  - Patient's fingerstick glucose goal is 100-180 mg/dL.    - For discharge, patient can ***.    - Patient can follow up at discharge with [facility] by calling [phone] to make an appointment.      Case discussed with Dr. Rose. Primary team updated. Type 2 diabetes mellitus with hyperglycemia  - STOP Lantus in setting of afternoon hypoglycemia. Will decide on dose based on tomorrow's fasting glucose.  - Continue lispro 10 units before each meal.  - Continue lispro moderate dose sliding scale before meals and at bedtime.  - Patient's fingerstick glucose goal is 100-180 mg/dL.    - For discharge, patient can U500 and admelog, dose TBD.  - Patient can follow up at discharge with [facility] by calling [phone] to make an appointment.      Case discussed with Dr. Rose. Primary team updated.

## 2023-06-21 NOTE — ED ADULT NURSE REASSESSMENT NOTE - NS ED NURSE REASSESS COMMENT FT1
Provider is at the bedside for USIV placement at this time.
Patient made aware that she is awaiting imaging results at this time, denies sob, remains resting on the stretcher. Updated patient on current status, assessment is ongoing.

## 2023-06-21 NOTE — H&P ADULT - PROBLEM SELECTOR PLAN 1
- WBC 16.8K initially, now improved to 8.8  - Lactate 7 --> improved to 3.5 s/p IVF  - f/u Blood cultures and urine culture (in lab)   - consider ostomy culture and ID Consult for abx given many abx allergies  - afebrile, mildly tachycardic, nontoxic appearing, continue to monitor closely  - UA shows WBCs, trace leuk esterase, no nitrite, +bacteria, moderate blood, and >1000 glucose  -  CTPE in ED negative for PE and CT Abdomen without acute findings  - s/p recent admission at [facility] for Urosepsis (Klebsiella and Ecoli discharged with left midline for tx w/ Ertapenem 6/9 and Gentamicin 6/5; pt reports missing two days tx of Ertapenem due to midline malfunction; removed ~1week ago by home care RN) - WBC 16.8K initially, now improved to 8.8  - Lactate 7 --> improved to 3.5 s/p IVF  - f/u Blood cultures and urine culture (in lab)   - consider ostomy culture and ID Consult for abx given many abx allergies  - afebrile, mildly tachycardic, nontoxic appearing, continue to monitor closely  - UA shows WBCs, trace leuk esterase, no nitrite, +bacteria, moderate blood, and >1000 glucose  -  CTPE in ED negative for PE and CT Abdomen without acute findings  - s/p recent admission at [facility] for Urosepsis (Klebsiella and Ecoli discharged with left midline for tx w/ Ertapenem 6/9 and Gentamicin 6/5; pt reports missing two days tx of Ertapenem due to midline malfunction; removed ~1week ago by home care RN)  - discussed with Medicine: will dose Meropenem x1 now - SIRS criteria met with lactate, WBC, tachycardia  - WBC 16.8K initially, now improved to 8.8  - Lactate 7 --> improved to 3.5 s/p IVF  -> now back up to 7, giving NS bolus  - f/u Blood cultures and urine culture (in lab)   - consider ostomy culture and ID Consult for abx given many abx allergies  - afebrile, mildly tachycardic, nontoxic appearing, continue to monitor closely  - UA shows WBCs, trace leuk esterase, no nitrite, +bacteria, moderate blood, and >1000 glucose  - CTPE in ED negative for PE and CT Abdomen without acute findings  - s/p recent admission at [facility] for Urosepsis (Klebsiella and Ecoli discharged with left midline for tx w/ Ertapenem 6/9 and Gentamicin 6/5; pt reports missing two days tx of Ertapenem due to midline malfunction; removed ~1week ago by home care RN)  - discussed with Medicine: will dose Meropenem x1 now, dose discussed with Pharmacy (1gram)  - f/u further Abx plan - SIRS criteria met with lactate, WBC, tachycardia  - WBC 16.8K initially, now improved to 8.8 w/ only IVF- possibly leukocytosis due to dehydration   - Lactate 7 --> improved to 3.5 s/p IVF  -> now back up to 7,  NS bolus given- now s/p 5L NS so far  - f/u repeat Lactate, VBG, CMP  - f/u Blood cultures and urine culture (in lab)   - consider ostomy GI culture   - afebrile, mildly tachycardic, nontoxic appearing, continue to monitor closely  - UA shows WBCs, trace leuk esterase, no nitrite, +bacteria, moderate blood, and >1000 glucose  - CTPE in ED negative for PE and CT Abdomen without acute findings  - s/p recent admission at [facility] for Urosepsis (Klebsiella and Ecoli discharged with left midline for tx w/ Ertapenem 6/9 and Gentamicin 6/5; pt reports missing two days tx of Ertapenem due to midline malfunction; removed ~1week ago by home care RN)  - discussed with Medicine: will dose Meropenem x1 now, dose discussed with Pharmacy (1gram)  - ID consulted: recommend watch off antibiotics   - ID recs consult  for removal of suprapubic catheter due to infectious risk - SIRS criteria met with lactate, WBC, tachycardia  - afebrile, mildly tachycardic, nontoxic appearing, continue to monitor closely  - WBC 16.8K initially, now improved to 8.8 w/ only IVF- possibly leukocytosis due to dehydration   - Lactate 7 --> 3.5  -> back up to 7,  now s/p total 5L NS with current lactate 2.8- f/u repeat at 10pm   - ESR 48 and CRP 32.5  - f/u Blood cultures - NGTD x12 hours  - UA +WBCs, trace leuk esterase, no nitrite, +bacteria, moderate blood, and >1000 glucose; f/u urine culture (in lab)   - CTPE in ED negative for PE and CT Abdomen without acute findings  - s/p recent admission at [facility] for Urosepsis (Klebsiella and Ecoli discharged with left midline for tx w/ Ertapenem 6/9 and Gentamicin 6/5; pt reports missing two days tx of Ertapenem due to midline malfunction; removed ~1week ago by home care RN)  - discussed with Medicine: will dose Meropenem x1 now, dose discussed with Pharmacy (1gram)  - ID consulted: recommend watch off antibiotics   - ID recs consult  for removal of suprapubic catheter due to infectious risk

## 2023-06-21 NOTE — H&P ADULT - HISTORY OF PRESENT ILLNESS
42 y/o  female with PMHx Asthma, CVA (11/2021), PE (on Eliquis last dose AM 6/20/23), DM2, HTN, HLD, hx abdominal necrotizing fasciitis (s/p suprapubic catheter, ostomy placement in 11/2021), s/p intubation, hx of frequent UTIs 2/2 to suprapubic catheter, most recent UTI c/b Urosepsis admitted [facility] 5/23-6/1 (Klebsiella and Ecoli discharged with left midline for tx w/ Ertapenem 6/9 and Gentamicin 6/5; pt reports missing two days tx of Ertapenem due to midline malfunction; removed ~1week ago by home care RN)  who presesents to [facility] ED on 6/20/23, complaining of abdominal pain, nausea, chest pressure, SOB, and blurry vision x3 days.  The chest pressure is substernal with left radiation, intermittent, exertional, pleuritic and positional in nature. It began 3 days ago while she was standing up; it is worse with standing or sitting upright, and relieved by reclining. Pt says it causes her to have shallow rapid breathing because deep breaths hurt worse. Pt reports diffuse body pain from abdomen to b/l LE. Pt's abdominal pain is crampier than usual and she reports a change to her ostomy drainage yesterday ("it was mush").  The abdominal pain is crampy in nature, predominantly in b/l lower quadrants, and wraps around to the back, and associated with nausea. Pt's blurred vision is worse R>L eye; she reports "seeing colored blobs" starting at about 5 feet away. Pt also reports b/l leg weakness  (L>R), paresthesias, and pain chronically since her stroke however worsening recently.  On ROS, pt endorses dependent b/l LE edema. Pt denies palpitations, fall, LOC, HA, dizziness, Vomiting, cough, rash, fever/chills/sick contact, diaphoresis, orthopnea/PND.  Pt admitted initially to cardiology for R/o ACS, which has been ruled out, and transferring to regional medicine floor.    In the ED, VS were 102HR, 98.4F, 152/82, 96% on Room air, 17 RR.   EKG showed sinus tachycardia at 100bpm, TW flattening in inferior and lateral walls. no acute ischemic changes; QTc 539ms.   Labs were remarkable for 16.8 WBC (repeat 8.86), 589K Plts (repeat 363K), K of 2.9 (repleted, now 3.7), Creatinine 1.86 (repeat 1.46), Lactate 7.3 (repeat 3.5); BHB 0.5, CRP 32.5: , Trop 55->52-->32.  FSBG 550s.   CTPE in ED negative for PE. CT Abdomen without acute findings. UA shows WBCs, trace leuk esterase, no nitrite, +bacteria, moderate blood, and >1000 glucose.  Pt s/p 4L NS , Asa 325mg, IV Solumedrol 40IVP and 50mg Benadryl IVP (CT contrast premedication),  and dilaudid for pain. 42 y/o female with PMHx Asthma, CVA (11/2021), PE (on Eliquis last dose AM 6/20/23), uncontrolled DM-2 (on insulin), HTN, HLD, hx abdominal necrotizing fasciitis (s/p suprapubic catheter, ostomy placement in 11/2021), s/p intubation, hx of frequent UTIs 2/2 to suprapubic catheter, most recent UTI c/b Urosepsis admitted [facility] 5/23-6/1 (Klebsiella and Ecoli discharged with left midline for tx w/ Ertapenem 6/9 and Gentamicin 6/5; pt reports missing two days tx of Ertapenem due to midline malfunction; removed ~1week ago by home care RN)  who presents to [facility] ED on 6/20/23, complaining of abdominal pain, nausea, chest pressure, SOB, blurry vision, and anorexia x3 days.  The chest pressure is substernal with left radiation, intermittent, exertional, pleuritic, positional, and reproducible with palpation. It began 3 days ago while she was standing up; it is worse with standing or sitting upright, and relieved by reclining. Pt says it causes her to have shallow rapid breathing because deep breaths hurt worse. Pt reports diffuse body pain from abdomen to b/l LE. Pt's abdominal pain is crampier than usual and she reports a change to her ostomy drainage yesterday ("it was mush").  The abdominal pain is crampy in nature, predominantly in b/l lower quadrants, and wraps around to the back, and associated with nausea. Pt's blurred vision is worse R>L eye; she reports "seeing colored blobs" starting at about 5 feet away. Pt also reports b/l leg weakness  (L>R), paresthesias, and pain chronically since her stroke however worsening recently.  On ROS, pt endorses dependent b/l LE edema. Pt denies palpitations, fall, LOC, HA, dizziness, Vomiting, cough, rash, fever/chills/sick contact, diaphoresis, orthopnea/PND. Patient admitted to cardiology for R/o ACS, Endocrine consulted for hyperglycemia and Medicine co-managing for further medical management.     In the ED, VS were 102HR, 98.4F, 152/82, 96% on Room air, 17 RR.   EKG showed sinus tachycardia at 100bpm, TW flattening in inferior and lateral walls. no acute ischemic changes; QTc 539ms.   Labs were remarkable for 16.8 WBC (repeat 8.86), 589K Plts (repeat 363K), K of 2.9 (repleted, now 3.7), Creatinine 1.86 (repeat 1.46), Lactate 7.3 (repeat 3.5); BHB 0.5, CRP 32.5: , Trop 55->52-->32.  FSBG 550s.   CTPE in ED negative for PE. CT Abdomen without acute findings. UA shows WBCs, trace leuk esterase, no nitrite, +bacteria, moderate blood, and >1000 glucose.  Pt s/p 4L NS , Asa 325mg, IV Solumedrol 40IVP and 50mg Benadryl IVP (CT contrast premedication),  and dilaudid for pain. 42 y/o female with PMHx Asthma, CVA (11/2021; residual L>R weakness), PE (4/2023 on Eliquis), uncontrolled DM-2 (on insulin), HTN, HLD, hx abdominal necrotizing fasciitis (s/p suprapubic catheter, ostomy placement in 11/2021 with intubation from 11-12/2021), hx of frequent UTIs 2/2 to suprapubic catheter, most recent UTI c/b Urosepsis admitted [facility] 5/23-6/1 (Klebsiella and Ecoli discharged with left midline for tx w/ Ertapenem 6/9 and Gentamicin 6/5; pt reports missing two days tx of Ertapenem due to midline malfunction; removed ~1week ago by home care RN)  who presents to [facility] ED on 6/20/23, complaining of abdominal pain, nausea, chest pressure, SOB, blurry vision, and anorexia x3 days.  The chest pressure is substernal with left radiation, intermittent, exertional, pleuritic, positional, and reproducible with palpation. It began 3 days ago while she was standing up; it is worse with standing or sitting upright, and relieved by reclining. Pt says it causes her to have shallow rapid breathing because deep breaths hurt worse. Pt reports diffuse body pain from abdomen to b/l LE. Pt's abdominal pain is crampier than usual and she reports a change to her ostomy drainage yesterday ("it was mush").  The abdominal pain is crampy in nature, predominantly in b/l lower quadrants, and wraps around to the back, and associated with nausea. Pt's blurred vision is worse R>L eye; she reports "seeing colored blobs" starting at about 5 feet away. Pt also reports b/l leg weakness  (L>R), paresthesias, and pain chronically since her stroke however worsening recently.  On ROS, pt endorses dependent b/l LE edema. Pt denies palpitations, fall, LOC, HA, dizziness, Vomiting, cough, rash, fever/chills/sick contact, diaphoresis, orthopnea/PND.     In the ED, VS were 102HR, 98.4F, 152/82, 96% on Room air, 17 RR.   EKG showed sinus tachycardia at 100bpm, TW flattening in inferior and lateral walls. no acute ischemic changes; QTc 539ms.   Labs were remarkable for 16.8 WBC (repeat 8.86), 589K Plts (repeat 363K), K of 2.9 (repleted, now 3.7), Creatinine 1.86 (repeat 1.46), Lactate 7.3 (repeat 3.5); BHB 0.5, CRP 32.5: , Trop 55->52-->32.  FSBG 550s.   CTPE in ED negative for PE. CT Abdomen without acute findings. UA shows WBCs, trace leuk esterase, no nitrite, +bacteria, moderate blood, and >1000 glucose.  Pt s/p 4L NS , Asa 325mg, IV Solumedrol 40IVP and 50mg Benadryl IVP (CT contrast premedication),  and dilaudid for pain.    Patient admitted to cardiology for R/o ACS, Endocrine consulted for hyperglycemia, Medicine and ID co-managing for management of SIRS.

## 2023-06-21 NOTE — H&P ADULT - NSHPADDITIONALINFOADULT_GEN_ALL_CORE
Attending Attestation:  I was physically present for the key portions of the evaluation and management (E/M) service provided.  I agree with the above history, physical, and plan which I have reviewed with the following edits/addendum:    - plan for TTE and ischemia evaluation with Nuclear Stress Test vs CCTA if kidney function continues to improve  - apprec Medicine comanagement     Lj Thompson MD  Cardiology    50 minutes spent on total encounter; more than 50% of the visit was spent counseling and/or coordinating care by the attending physician. Attending Attestation:  I was physically present for the key portions of the evaluation and management (E/M) service provided.  I agree with the above history, physical, and plan which I have reviewed with the following edits/addendum:    43F w hx of PE, uncontrolled DM2 (a1c 12), HTN, HLP, frequent UTIs, abd nec fasc in 2021 s/p ostomy, suprapub cath c/b stroke w LLE weakness who was recently admitted in [facility] last month for complicated UTI sepsis p/w abd pain, nausea, chest pressure, dyspnea x several days. CP pleuritic, positional, constant. ED w/u: CTA PE negative, ECG nonischemic, hs Trop T low flat x 2 now negative, CT abd/pelv wo acute findings. + lactate, elev bld glucose, normal-mild gap, given 4L IV fluids.     - plan for TTE and ischemia evaluation with Nuclear Stress Test vs CCTA if kidney function continues to improve  - apprec Medicine comanagement     Lj Thompson MD  Cardiology    50 minutes spent on total encounter; more than 50% of the visit was spent counseling and/or coordinating care by the attending physician. Attending Attestation:  I was physically present for the key portions of the evaluation and management (E/M) service provided.  I agree with the above history, physical, and plan which I have reviewed with the following edits/addendum:    43F w hx of PE, uncontrolled DM2 (a1c 12), HTN, HLP, frequent UTIs, abd nec fasc in 2021 s/p ostomy, suprapub cath c/b stroke after a fall w LLE weakness who was recently admitted in [facility] last month for complicated UTI sepsis p/w abd pain, nausea, chest pressure, dyspnea x several days. CP pleuritic, positional, constant. Reports 2 weeks of change in color of urine from suprapubic catheter. ED w/u: CTA PE negative, ECG w TW flattening laterally, hs Trop T low flat x 2, now negative, CT abd/pelv wo acute findings. + lactate, elev bld glucose, normal-mild gap, given 4L IV fluids. On exam, pt euvolemic. + tenderness on lower abd diffusely.     TTE 6/21: hyperdynamic LVEF 70-75%, small IVC consistent w dehydration  - LORA likely prerenal  - plan for ischemia evaluation w CCTA if kidney function continues to improve. D/w pt r/b with hx of contrast allergy. Premedicate before and after procedure, to continue w 2nd gen antihistamine for 5 days per pt experience of lingering itchiness post contrast administration  - continue IVF hydration, beta blocker for HR control in preparation for CCTA  - apprec Medicine comanagement     Lj Thompson MD  Cardiology    50 minutes spent on total encounter; more than 50% of the visit was spent counseling and/or coordinating care by the attending physician.

## 2023-06-21 NOTE — CONSULT NOTE ADULT - SUBJECTIVE AND OBJECTIVE BOX
HPI:  42 y/o female with PMHx Asthma, CVA (2021), PE (on Eliquis last dose AM 23), uncontrolled DM-2 (on insulin), HTN, HLD, hx abdominal necrotizing fasciitis (s/p suprapubic catheter, ostomy placement in 2021), s/p intubation, hx of frequent UTIs 2/2 to suprapubic catheter, most recent UTI c/b Urosepsis admitted [facility] [id number] - (Klebsiella and Ecoli discharged with left midline for tx w/ Ertapenem  and Gentamicin ; pt reports missing two days tx of Ertapenem due to midline malfunction; removed ~1week ago by home care RN)  who presents to [facility] ED on 23, complaining of abdominal pain, nausea, chest pressure, SOB, blurry vision, and anorexia x3 days.  The chest pressure is substernal with left radiation, intermittent, exertional, pleuritic, positional, and reproducible with palpation. It began 3 days ago while she was standing up; it is worse with standing or sitting upright, and relieved by reclining. Pt says it causes her to have shallow rapid breathing because deep breaths hurt worse. Pt reports diffuse body pain from abdomen to b/l LE. Pt's abdominal pain is crampier than usual and she reports a change to her ostomy drainage yesterday ("it was mush").  The abdominal pain is crampy in nature, predominantly in b/l lower quadrants, and wraps around to the back, and associated with nausea. Pt's blurred vision is worse R>L eye; she reports "seeing colored blobs" starting at about 5 feet away. Pt also reports b/l leg weakness  (L>R), paresthesias, and pain chronically since her stroke however worsening recently.  On ROS, pt endorses dependent b/l LE edema. Pt denies palpitations, fall, LOC, HA, dizziness, Vomiting, cough, rash, fever/chills/sick contact, diaphoresis, orthopnea/PND. Patient admitted to cardiology for R/o ACS, Endocrine consulted for hyperglycemia and Medicine co-managing for further medical management.     In the ED, VS were 102HR, 98.4F, 152/82, 96% on Room air, 17 RR.   EKG showed sinus tachycardia at 100bpm, TW flattening in inferior and lateral walls. no acute ischemic changes; QTc 539ms.   Labs were remarkable for 16.8 WBC (repeat 8.86), 589K Plts (repeat 363K), K of 2.9 (repleted, now 3.7), Creatinine 1.86 (repeat 1.46), Lactate 7.3 (repeat 3.5); BHB 0.5, CRP 32.5: , Trop 55->52-->32.  FSBG 550s.   CTPE in ED negative for PE. CT Abdomen without acute findings. UA shows WBCs, trace leuk esterase, no nitrite, +bacteria, moderate blood, and >1000 glucose.  Pt s/p 4L NS , Asa 325mg, IV Solumedrol 40IVP and 50mg Benadryl IVP (CT contrast premedication),  and dilaudid for pain. (2023 09:27)      PAST MEDICAL & SURGICAL HISTORY:  Essential hypertension      Hyperlipidemia      Obesity      Abdominal hernia      Pre-eclampsia      Pulmonary embolism      Type 2 diabetes mellitus      History of necrotizing fasciitis      History of creation of ostomy      Suprapubic catheter      H/O: CVA (cerebrovascular accident)      Sepsis, unspecified organism      H/O LEEP      History of D&C      H/O:       H/O ventral hernia repair      H/O exploratory laparotomy      History of creation of ostomy            REVIEW OF SYSTEMS:    General:	 no weakness; no fevers, no chills  Skin/Breast: no rash  Respiratory and Thorax: no SOB, no cough  Cardiovascular:	No chest pain  Gastrointestinal:	 no nausea, vomiting , diarrhea  Genitourinary:	no dysuria, no difficulty urinating, no hematuria  Musculoskeletal:	no weakness, no joint swelling/pain  Neurological:	no focal weakness/numbness  Endocrine:	no polyuria, no polydipsia      ANTIBIOTICS:  MEDICATIONS  (STANDING):  aspirin enteric coated 81 milliGRAM(s) Oral daily  dextrose 5%. 1000 milliLiter(s) (50 mL/Hr) IV Continuous <Continuous>  dextrose 5%. 1000 milliLiter(s) (100 mL/Hr) IV Continuous <Continuous>  dextrose 50% Injectable 12.5 Gram(s) IV Push once  dextrose 50% Injectable 25 Gram(s) IV Push once  dextrose 50% Injectable 25 Gram(s) IV Push once  gabapentin 600 milliGRAM(s) Oral two times a day  glucagon  Injectable 1 milliGRAM(s) IntraMuscular once  heparin  Infusion.  Unit(s)/Hr (18 mL/Hr) IV Continuous <Continuous>  insulin glargine Injectable (LANTUS) 70 Unit(s) SubCutaneous <User Schedule>  insulin lispro (ADMELOG) corrective regimen sliding scale   SubCutaneous Before meals and at bedtime  insulin lispro Injectable (ADMELOG) 10 Unit(s) SubCutaneous three times a day before meals  labetalol 100 milliGRAM(s) Oral two times a day  oxybutynin 5 milliGRAM(s) Oral three times a day  potassium chloride    Tablet ER 40 milliEquivalent(s) Oral every 4 hours    MEDICATIONS  (PRN):  acetaminophen     Tablet .. 650 milliGRAM(s) Oral every 6 hours PRN Mild Pain (1 - 3)  dextrose Oral Gel 15 Gram(s) Oral once PRN Blood Glucose LESS THAN 70 milliGRAM(s)/deciliter  ipratropium    for Nebulization 500 MICROGram(s) Nebulizer every 6 hours PRN Shortness of Breath and/or Wheezing  oxyCODONE    IR 5 milliGRAM(s) Oral every 8 hours PRN Moderate Pain (4 - 6)      Allergies    shellfish. (Hives; Anaphylaxis)  iodine containing compounds (Hives; Anaphylaxis)  fish (Hives; Urticaria)  clindamycin (Pruritus)  vancomycin (Anaphylaxis; Hives)  penicillin (Hives)  amoxicillin (Unknown)    Intolerances    Bactrim I.V. (Rash (Mod to Severe))      SOCIAL HISTORY:    FAMILY HISTORY:  FH: diabetes mellitus  father and mother    FH: hypertension  father and mother        Vital Signs Last 24 Hrs  T(C): 36.7 (2023 13:54), Max: 37.1 (2023 19:28)  T(F): 98.1 (2023 13:54), Max: 98.7 (2023 19:28)  HR: 86 (2023 14:13) (86 - 135)  BP: 136/68 (2023 14:13) (135/90 - 156/80)  BP(mean): 89 (2023 14:13) (89 - 105)  RR: 18 (2023 14:13) (16 - 18)  SpO2: 99% (2023 14:13) (94% - 100%)    Parameters below as of 2023 14:13  Patient On (Oxygen Delivery Method): room air        PHYSICAL EXAM:  Constitutional: NAD  Eyes: NAOMI, EOMI  Ear/Nose/Throat: no oral lesion, no sinus tenderness on percussion	  Neck: no JVD, no lymphadenopathy, supple  Respiratory: CTA cristi  Cardiovascular: S1S2 RRR, no murmurs  Gastrointestinal: soft, (+) BS, no HSM  Extremities:no e/e/c  Vascular: DP Pulse: right normal; left normal            LABS:                        11.1   11.30 )-----------( 392      ( 2023 12:00 )             35.9     06-    x   |  104  |  x   ----------------------------<  111<H>  x    |  22  |  x     Ca    8.2<L>      2023 12:00  Mg     1.7     -    TPro  7.5  /  Alb  3.4  /  TBili  <0.2  /  DBili  x   /  AST  12  /  ALT  8<L>  /  AlkPhos  143<H>  06-21    PT/INR - ( 2023 20:37 )   PT: 13.9 sec;   INR: 1.17          PTT - ( 2023 20:37 )  PTT:31.6 sec  Urinalysis Basic - ( 2023 14:59 )    Color: x / Appearance: x / SG: x / pH: x  Gluc: 111 mg/dL / Ketone: x  / Bili: x / Urobili: x   Blood: x / Protein: x / Nitrite: x   Leuk Esterase: x / RBC: x / WBC x   Sq Epi: x / Non Sq Epi: x / Bacteria: x        MICROBIOLOGY: Culture - Blood (23 @ 20:54)    Specimen Source: .Blood Blood-Venous   Culture Results:   No growth at 12 hours        RADIOLOGY & ADDITIONAL STUDIES: reviewed

## 2023-06-21 NOTE — H&P ADULT - PROBLEM SELECTOR PLAN 8
- CTPA in ED w/o PE  (hx PE 04/23, on Eliquis at home)  - Hep gtt here initiated - CTPA in ED w/o PE  (hx PE 04/23, on Eliquis at home)  - Hep gtt here initiated  - monitor PTT - Prior admission 5/22-6/1 for UTI of suprapubic catheter,  ESBL Klebsiella  - UA as above. F/u UCx (in lab)  - diffusely tender abdomen worse in suprapubic area; c/w serial abdominal exams  - CT abd nonrevealing as above  - c/w home oxybutinin   - Per ID, will consult  Service in AM to discuss removal of suprapubic catheter due to risk of infection

## 2023-06-21 NOTE — H&P ADULT - PROBLEM SELECTOR PLAN 11
- persistent L>R UE and LE weakness and paresthesias  - s/p dilaudid in ED and home dilaudid, however hold due to prolonged QTc  - Home gabapentin 600mg TID --> c/w renally dosed BID 600mg for now  - home oxycodone 10mg from last admission --> renally dosed at 5mg q8h for now, can uptitrate as needed

## 2023-06-21 NOTE — CONSULT NOTE ADULT - SUBJECTIVE AND OBJECTIVE BOX
INTERNAL MEDICINE - INITIAL CONSULT NOTE    HASMUKH AGUILERA  [id number]    Patient is a 43y old  Female who presents with a chief complaint of dizziness, chest pressure, and blurred vision (2023 09:27)      HPI:  42 y/o  female with PMHx Asthma, CVA (2021), PE (on Eliquis last dose AM 23), DM2, HTN, HLD, hx abdominal necrotizing fasciitis (s/p suprapubic catheter, ostomy placement in 2021), s/p intubation, hx of frequent UTIs 2/2 to suprapubic catheter, most recent UTI c/b Urosepsis admitted [facility] - (Klebsiella and Ecoli discharged with left midline for tx w/ Ertapenem  and Gentamicin ; pt reports missing two days tx of Ertapenem due to midline malfunction; removed ~1week ago by home care RN)  who presesents to [facility] ED on 23, complaining of abdominal pain, nausea, chest pressure, SOB, and blurry vision x3 days.  The chest pressure is substernal with left radiation, intermittent, exertional, pleuritic and positional in nature. It began 3 days ago while she was standing up; it is worse with standing or sitting upright, and relieved by reclining. Pt says it causes her to have shallow rapid breathing because deep breaths hurt worse. Pt reports diffuse body pain from abdomen to b/l LE. Pt's abdominal pain is crampier than usual and she reports a change to her ostomy drainage yesterday ("it was mush").  The abdominal pain is crampy in nature, predominantly in b/l lower quadrants, and wraps around to the back, and associated with nausea. Pt's blurred vision is worse R>L eye; she reports "seeing colored blobs" starting at about 5 feet away. Pt also reports b/l leg weakness  (L>R), paresthesias, and pain chronically since her stroke however worsening recently.  On ROS, pt endorses dependent b/l LE edema. Pt denies palpitations, fall, LOC, HA, dizziness, Vomiting, cough, rash, fever/chills/sick contact, diaphoresis, orthopnea/PND.  Pt admitted initially to cardiology for R/o ACS, which has been ruled out, and transferring to regional medicine floor.    In the ED, VS were 102HR, 98.4F, 152/82, 96% on Room air, 17 RR.   EKG showed sinus tachycardia at 100bpm, TW flattening in inferior and lateral walls. no acute ischemic changes; QTc 539ms.   Labs were remarkable for 16.8 WBC (repeat 8.86), 589K Plts (repeat 363K), K of 2.9 (repleted, now 3.7), Creatinine 1.86 (repeat 1.46), Lactate 7.3 (repeat 3.5); BHB 0.5, CRP 32.5: , Trop 55->52-->32.  FSBG 550s.   CTPE in ED negative for PE. CT Abdomen without acute findings. UA shows WBCs, trace leuk esterase, no nitrite, +bacteria, moderate blood, and >1000 glucose.  Pt s/p 4L NS , Asa 325mg, IV Solumedrol 40IVP and 50mg Benadryl IVP (CT contrast premedication),  and dilaudid for pain. (2023 09:27)      PAST MEDICAL & SURGICAL HISTORY:  Essential hypertension      Hyperlipidemia      Obesity      Abdominal hernia      Pre-eclampsia      Pulmonary embolism      Type 2 diabetes mellitus      History of necrotizing fasciitis      History of creation of ostomy      Suprapubic catheter      H/O: CVA (cerebrovascular accident)      Sepsis, unspecified organism      H/O LEEP      History of D&C      H/O:       H/O ventral hernia repair      H/O exploratory laparotomy      History of creation of ostomy          acetaminophen     Tablet .. 650 milliGRAM(s) Oral every 6 hours PRN  aspirin enteric coated 81 milliGRAM(s) Oral daily  dextrose 5%. 1000 milliLiter(s) IV Continuous <Continuous>  dextrose 5%. 1000 milliLiter(s) IV Continuous <Continuous>  dextrose 50% Injectable 25 Gram(s) IV Push once  dextrose 50% Injectable 12.5 Gram(s) IV Push once  dextrose 50% Injectable 25 Gram(s) IV Push once  dextrose Oral Gel 15 Gram(s) Oral once PRN  gabapentin 600 milliGRAM(s) Oral two times a day  glucagon  Injectable 1 milliGRAM(s) IntraMuscular once  heparin  Infusion.  Unit(s)/Hr IV Continuous <Continuous>  insulin glargine Injectable (LANTUS) 70 Unit(s) SubCutaneous <User Schedule>  insulin lispro (ADMELOG) corrective regimen sliding scale   SubCutaneous Before meals and at bedtime  insulin lispro Injectable (ADMELOG) 10 Unit(s) SubCutaneous three times a day before meals  ipratropium    for Nebulization 500 MICROGram(s) Nebulizer every 6 hours PRN  labetalol 100 milliGRAM(s) Oral two times a day  meropenem  IVPB 1000 milliGRAM(s) IV Intermittent once  oxybutynin 5 milliGRAM(s) Oral three times a day  oxyCODONE    IR 5 milliGRAM(s) Oral every 8 hours PRN  potassium chloride    Tablet ER 40 milliEquivalent(s) Oral every 4 hours      FAMILY HISTORY:  FH: diabetes mellitus  father and mother    FH: hypertension  father and mother        REVIEW OF SYSTEMS:  CONSTITUTIONAL: No weakness, fever, or chills  EYES: No eye pain or discharge  ENMT:  No sinus or throat pain  NECK: No pain or stiffness  RESPIRATORY: No cough, wheezing, chills or hemoptysis; No shortness of breath  CARDIOVASCULAR: No chest pain, palpitations, dizziness, or leg swelling  GASTROINTESTINAL: No abdominal or epigastric pain. No nausea, vomiting, or hematemesis; No diarrhea or constipation. No melena or hematochezia.  GENITOURINARY: No dysuria or incontinence  NEUROLOGICAL: No headaches, memory loss, loss of strength, numbness, or tremors  SKIN: No new rashes  MUSCULOSKELETAL: No joint pain or swelling; No muscle, back, or extremity pain  HEME/LYMPH: No easy bruising, or bleeding gums      PHYSICAL EXAM:  T(C): 36.9 (23 @ 09:27), Max: 37.1 (23 @ 19:28)  HR: 100 (23 @ 09:29) (98 - 135)  BP: 153/72 (23 @ 09:29) (135/90 - 156/80)  RR: 18 (23 @ 09:29) (16 - 18)  SpO2: 100% (23 @ 09:29) (94% - 100%)    General: NAD, laying in bed, speaking in full sentences  HEENT: head NC/AT, no conjunctival injection, EOMI, MMM  Neck: supple, no JVD  Cardio: RRR, +S1/S2, no M/R/G  Resp: lungs CTAB, no cough/wheezes/rales/rhonchi  Abdo: soft, NT, ND, +bowel sounds x4, no organomegaly or palpable mass    Extremities: WWP, no edema/cyanosis/clubbing   Vasc: 2+ radial and DP pulses b/l  Neuro: A&Ox3  Psych: speech non-pressured, thoughts goal-oriented  Skin: dry, intact, no visible jaundice   MSK: no joint swelling      Consultant(s) Notes Reviewed:  [x ] YES  [ ] NO  Care Discussed with Consultants/Other Providers [ x] YES  [ ] NO    LABS:          RADIOLOGY & ADDITIONAL TESTS:    Imaging Personally Reviewed:  [X] YES  [ ] NO INTERNAL MEDICINE - INITIAL CONSULT NOTE    HASMUKH AGUILERA  [id number]    Patient is a 43y old  Female who presents with a chief complaint of dizziness, chest pressure, and blurred vision (2023 09:27)      HPI:  42 y/o  female with PMHx Asthma, CVA (2021), PE (on Eliquis last dose AM 23), DM2, HTN, HLD, hx abdominal necrotizing fasciitis (s/p suprapubic catheter, ostomy placement in 2021), s/p intubation, hx of frequent UTIs 2/2 to suprapubic catheter, most recent UTI c/b Urosepsis admitted [facility] - (Klebsiella and Ecoli discharged with left midline for tx w/ Ertapenem  and Gentamicin ; pt reports missing two days tx of Ertapenem due to midline malfunction; removed ~1week ago by home care RN)  who presesents to [facility] ED on 23, complaining of abdominal pain, nausea, chest pressure, SOB, and blurry vision x3 days.  The chest pressure is substernal with left radiation, intermittent, exertional, pleuritic and positional in nature. It began 3 days ago while she was standing up; it is worse with standing or sitting upright, and relieved by reclining. Pt says it causes her to have shallow rapid breathing because deep breaths hurt worse. Pt reports diffuse body pain from abdomen to b/l LE. Pt's abdominal pain is crampier than usual and she reports a change to her ostomy drainage yesterday ("it was mush").  The abdominal pain is crampy in nature, predominantly in b/l lower quadrants, and wraps around to the back, and associated with nausea. Pt's blurred vision is worse R>L eye; she reports "seeing colored blobs" starting at about 5 feet away. Pt also reports b/l leg weakness  (L>R), paresthesias, and pain chronically since her stroke however worsening recently.  On ROS, pt endorses dependent b/l LE edema. Pt denies palpitations, fall, LOC, HA, dizziness, Vomiting, cough, rash, fever/chills/sick contact, diaphoresis, orthopnea/PND.  Pt admitted initially to cardiology for R/o ACS, which has been ruled out, and transferring to regional medicine floor.    In the ED, VS were 102HR, 98.4F, 152/82, 96% on Room air, 17 RR.   EKG showed sinus tachycardia at 100bpm, TW flattening in inferior and lateral walls. no acute ischemic changes; QTc 539ms.   Labs were remarkable for 16.8 WBC (repeat 8.86), 589K Plts (repeat 363K), K of 2.9 (repleted, now 3.7), Creatinine 1.86 (repeat 1.46), Lactate 7.3 (repeat 3.5); BHB 0.5, CRP 32.5: , Trop 55->52-->32.  FSBG 550s.   CTPE in ED negative for PE. CT Abdomen without acute findings. UA shows WBCs, trace leuk esterase, no nitrite, +bacteria, moderate blood, and >1000 glucose.  Pt s/p 4L NS , Asa 325mg, IV Solumedrol 40IVP and 50mg Benadryl IVP (CT contrast premedication),  and dilaudid for pain. (2023 09:27)      PAST MEDICAL & SURGICAL HISTORY:  Essential hypertension      Hyperlipidemia      Obesity      Abdominal hernia      Pre-eclampsia      Pulmonary embolism      Type 2 diabetes mellitus      History of necrotizing fasciitis      History of creation of ostomy      Suprapubic catheter      H/O: CVA (cerebrovascular accident)      Sepsis, unspecified organism      H/O LEEP      History of D&C      H/O:       H/O ventral hernia repair      H/O exploratory laparotomy      History of creation of ostomy          acetaminophen     Tablet .. 650 milliGRAM(s) Oral every 6 hours PRN  aspirin enteric coated 81 milliGRAM(s) Oral daily  dextrose 5%. 1000 milliLiter(s) IV Continuous <Continuous>  dextrose 5%. 1000 milliLiter(s) IV Continuous <Continuous>  dextrose 50% Injectable 25 Gram(s) IV Push once  dextrose 50% Injectable 12.5 Gram(s) IV Push once  dextrose 50% Injectable 25 Gram(s) IV Push once  dextrose Oral Gel 15 Gram(s) Oral once PRN  gabapentin 600 milliGRAM(s) Oral two times a day  glucagon  Injectable 1 milliGRAM(s) IntraMuscular once  heparin  Infusion.  Unit(s)/Hr IV Continuous <Continuous>  insulin glargine Injectable (LANTUS) 70 Unit(s) SubCutaneous <User Schedule>  insulin lispro (ADMELOG) corrective regimen sliding scale   SubCutaneous Before meals and at bedtime  insulin lispro Injectable (ADMELOG) 10 Unit(s) SubCutaneous three times a day before meals  ipratropium    for Nebulization 500 MICROGram(s) Nebulizer every 6 hours PRN  labetalol 100 milliGRAM(s) Oral two times a day  meropenem  IVPB 1000 milliGRAM(s) IV Intermittent once  oxybutynin 5 milliGRAM(s) Oral three times a day  oxyCODONE    IR 5 milliGRAM(s) Oral every 8 hours PRN  potassium chloride    Tablet ER 40 milliEquivalent(s) Oral every 4 hours      FAMILY HISTORY:  FH: diabetes mellitus  father and mother    FH: hypertension  father and mother        REVIEW OF SYSTEMS:  CONSTITUTIONAL: No weakness, fever, or chills  EYES: No eye pain or discharge  ENMT:  No sinus or throat pain  NECK: No pain or stiffness  RESPIRATORY: No cough, wheezing, chills or hemoptysis; No shortness of breath  CARDIOVASCULAR: No palpitations, dizziness, or leg swelling  GASTROINTESTINAL: No abdominal or epigastric pain. No nausea, vomiting, or hematemesis; No diarrhea or constipation. No melena or hematochezia.  GENITOURINARY: No dysuria or incontinence  NEUROLOGICAL: No headaches, memory loss, loss of strength, numbness, or tremors  SKIN: No new rashes  MUSCULOSKELETAL: No joint pain or swelling; No muscle, back, or extremity pain  HEME/LYMPH: No easy bruising, or bleeding gums      PHYSICAL EXAM:  T(C): 36.9 (23 @ 09:27), Max: 37.1 (23 @ 19:28)  HR: 100 (23 @ 09:29) (98 - 135)  BP: 153/72 (23 @ 09:29) (135/90 - 156/80)  RR: 18 (23 @ 09:29) (16 - 18)  SpO2: 100% (23 @ 09:29) (94% - 100%)    General: NAD, resting in bed  HEENT: MMM  Neck: supple  Cardiovascular: +S1/S2; RRR  Respiratory: CTA B/L; no W/R/R  Gastrointestinal: soft, ND, +BS, ostomy bag in place, clean well-appearing surrounding skin, no output in bag, clean dry umbilicus, tenderness to palpation but out of proportion with exam and patient appears comfortable despite exclamation.   Back: tenderness to palpation of R paraspinal region of midback  : Cook bag with clear yellow urine  Extremities: WWP; no edema, clubbing or cyanosis; diffusely tender/sensitive to palpation  Vascular: 2+ radial, DP/PT pulses B/L  Neurological: AAOx3; no focal deficits      Consultant(s) Notes Reviewed:  [x ] YES  [ ] NO  Care Discussed with Consultants/Other Providers [ x] YES  [ ] NO    LABS:          RADIOLOGY & ADDITIONAL TESTS:    Imaging Personally Reviewed:  [X] YES  [ ] NO INTERNAL MEDICINE - INITIAL CONSULT NOTE    HASMUKH AGUILERA  [id number]    Patient is a 43y old  Female who presents with a chief complaint of dizziness, chest pressure, and blurred vision (2023 09:27)      HPI:  42 y/o  female with PMHx Asthma, CVA (2021) with residual left LE weakness,  PE (on Eliquis last dose AM 23), DM2-poorly controlled on insulin therapy, HTN, HLD, hx abdominal necrotizing fasciitis (s/p suprapubic catheter, ostomy placement in 2021), s/p intubation, hx of frequent UTIs 2/2 to suprapubic catheter, most recent UTI c/b Urosepsis admitted [facility] - (Klebsiella and Ecoli discharged with left midline for tx w/ Ertapenem  and Gentamicin ; pt reports missing two days tx of Ertapenem due to midline malfunction; removed ~1week ago by home care RN)  who presesents to [facility] ED on 23, complaining of abdominal pain, nausea, chest pressure, SOB, and blurry vision x3 days.  The chest pressure is substernal with left radiation, intermittent, exertional, pleuritic and positional in nature. It began 3 days ago while she was standing up; it is worse with standing or sitting upright, and relieved by reclining. Pt says it causes her to have shallow rapid breathing because deep breaths hurt worse. Pt reports diffuse body pain from abdomen to b/l LE. Pt's abdominal pain is crampier than usual and she reports a change to her ostomy drainage yesterday ("it was mush").  The abdominal pain is crampy in nature, predominantly in b/l lower quadrants, and wraps around to the back, and associated with nausea. Pt's blurred vision is worse R>L eye; she reports "seeing colored blobs" starting at about 5 feet away. Pt also reports b/l leg weakness  (L>R), paresthesias, and pain chronically since her stroke however worsening recently.  On ROS, pt endorses dependent b/l LE edema. Pt denies palpitations, fall, LOC, HA, dizziness, Vomiting, cough, rash, fever/chills/sick contact, diaphoresis, orthopnea/PND.  Pt admitted initially to cardiology for R/o ACS,    In the ED, VS were 102HR, 98.4F, 152/82, 96% on Room air, 17 RR.   EKG showed sinus tachycardia at 100bpm, TW flattening in inferior and lateral walls. no acute ischemic changes; QTc 539ms.   Labs were remarkable for 16.8 WBC (repeat 8.86), 589K Plts (repeat 363K), K of 2.9 (repleted, now 3.7), Creatinine 1.86 (repeat 1.46), Lactate 7.3 (repeat 3.5); BHB 0.5, CRP 32.5: , Trop 55->52-->32.  FSBG 550s.   CTPE in ED negative for PE. CT Abdomen without acute findings. UA shows WBCs, trace leuk esterase, no nitrite, +bacteria, moderate blood, and >1000 glucose.  Pt s/p 4L NS , Asa 325mg, IV Solumedrol 40IVP and 50mg Benadryl IVP (CT contrast premedication),  and dilaudid for pain. (2023 09:27)      PAST MEDICAL & SURGICAL HISTORY:  Essential hypertension      Hyperlipidemia      Obesity      Abdominal hernia      Pre-eclampsia      Pulmonary embolism      Type 2 diabetes mellitus      History of necrotizing fasciitis      History of creation of ostomy      Suprapubic catheter      H/O: CVA (cerebrovascular accident)      Sepsis, unspecified organism      H/O LEEP      History of D&C      H/O:       H/O ventral hernia repair      H/O exploratory laparotomy      History of creation of ostomy          acetaminophen     Tablet .. 650 milliGRAM(s) Oral every 6 hours PRN  aspirin enteric coated 81 milliGRAM(s) Oral daily  dextrose 5%. 1000 milliLiter(s) IV Continuous <Continuous>  dextrose 5%. 1000 milliLiter(s) IV Continuous <Continuous>  dextrose 50% Injectable 25 Gram(s) IV Push once  dextrose 50% Injectable 12.5 Gram(s) IV Push once  dextrose 50% Injectable 25 Gram(s) IV Push once  dextrose Oral Gel 15 Gram(s) Oral once PRN  gabapentin 600 milliGRAM(s) Oral two times a day  glucagon  Injectable 1 milliGRAM(s) IntraMuscular once  heparin  Infusion.  Unit(s)/Hr IV Continuous <Continuous>  insulin glargine Injectable (LANTUS) 70 Unit(s) SubCutaneous <User Schedule>  insulin lispro (ADMELOG) corrective regimen sliding scale   SubCutaneous Before meals and at bedtime  insulin lispro Injectable (ADMELOG) 10 Unit(s) SubCutaneous three times a day before meals  ipratropium    for Nebulization 500 MICROGram(s) Nebulizer every 6 hours PRN  labetalol 100 milliGRAM(s) Oral two times a day  meropenem  IVPB 1000 milliGRAM(s) IV Intermittent once  oxybutynin 5 milliGRAM(s) Oral three times a day  oxyCODONE    IR 5 milliGRAM(s) Oral every 8 hours PRN  potassium chloride    Tablet ER 40 milliEquivalent(s) Oral every 4 hours      FAMILY HISTORY:  FH: diabetes mellitus  father and mother    FH: hypertension  father and mother        REVIEW OF SYSTEMS:  CONSTITUTIONAL: No weakness, fever, or chills  EYES: No eye pain or discharge  ENMT:  No sinus or throat pain  NECK: No pain or stiffness  RESPIRATORY: No cough, wheezing, chills or hemoptysis; No shortness of breath  CARDIOVASCULAR: No palpitations, dizziness, or leg swelling  GASTROINTESTINAL: No abdominal or epigastric pain. No nausea, vomiting, or hematemesis; No diarrhea or constipation. No melena or hematochezia.  GENITOURINARY: No dysuria or incontinence  NEUROLOGICAL: No headaches, memory loss, loss of strength, numbness, or tremors  SKIN: No new rashes  MUSCULOSKELETAL: No joint pain or swelling; No muscle, back, or extremity pain  HEME/LYMPH: No easy bruising, or bleeding gums      PHYSICAL EXAM:  T(C): 36.9 (23 @ 09:27), Max: 37.1 (23 @ 19:28)  HR: 100 (23 @ 09:29) (98 - 135)  BP: 153/72 (23 @ 09:29) (135/90 - 156/80)  RR: 18 (23 @ 09:29) (16 - 18)  SpO2: 100% (23 @ 09:29) (94% - 100%)    General: NAD, resting in bed  HEENT: MMM  Neck: supple  Cardiovascular: +S1/S2; RRR  Respiratory: CTA B/L; no W/R/R  Gastrointestinal: soft, ND, +BS, ostomy bag in place, clean well-appearing surrounding skin, no output in bag, clean dry umbilicus, tenderness to palpation but out of proportion with exam and patient appears comfortable despite exclamation.   Back: tenderness to palpation of R paraspinal region of midback  : Cook bag with clear yellow urine  Extremities: WWP; no edema, clubbing or cyanosis; diffusely tender/sensitive to palpation  Vascular: 2+ radial, DP/PT pulses B/L  Neurological: AAOx3; no focal deficits      Consultant(s) Notes Reviewed:  [x ] YES  [ ] NO  Care Discussed with Consultants/Other Providers [ x] YES  [ ] NO    LABS:          RADIOLOGY & ADDITIONAL TESTS:    Imaging Personally Reviewed:  [X] YES  [ ] NO

## 2023-06-21 NOTE — H&P ADULT - NSICDXPASTMEDICALHX_GEN_ALL_CORE_FT
PAST MEDICAL HISTORY:  Abdominal hernia     Essential hypertension     H/O: CVA (cerebrovascular accident)     History of creation of ostomy     History of necrotizing fasciitis     Hyperlipidemia     Obesity     Pre-eclampsia     Pulmonary embolism     Sepsis, unspecified organism     Suprapubic catheter     Type 2 diabetes mellitus

## 2023-06-22 LAB
ALBUMIN SERPL ELPH-MCNC: 3.2 G/DL — LOW (ref 3.3–5)
ALP SERPL-CCNC: 124 U/L — HIGH (ref 40–120)
ALT FLD-CCNC: 8 U/L — LOW (ref 10–45)
ANION GAP SERPL CALC-SCNC: 10 MMOL/L — SIGNIFICANT CHANGE UP (ref 5–17)
ANION GAP SERPL CALC-SCNC: 12 MMOL/L — SIGNIFICANT CHANGE UP (ref 5–17)
APTT BLD: 69.7 SEC — HIGH (ref 27.5–35.5)
APTT BLD: 72.1 SEC — HIGH (ref 27.5–35.5)
AST SERPL-CCNC: 14 U/L — SIGNIFICANT CHANGE UP (ref 10–40)
BILIRUB SERPL-MCNC: <0.2 MG/DL — SIGNIFICANT CHANGE UP (ref 0.2–1.2)
BUN SERPL-MCNC: 22 MG/DL — SIGNIFICANT CHANGE UP (ref 7–23)
BUN SERPL-MCNC: 26 MG/DL — HIGH (ref 7–23)
CALCIUM SERPL-MCNC: 7.5 MG/DL — LOW (ref 8.4–10.5)
CALCIUM SERPL-MCNC: 8.2 MG/DL — LOW (ref 8.4–10.5)
CHLORIDE SERPL-SCNC: 103 MMOL/L — SIGNIFICANT CHANGE UP (ref 96–108)
CHLORIDE SERPL-SCNC: 108 MMOL/L — SIGNIFICANT CHANGE UP (ref 96–108)
CO2 SERPL-SCNC: 22 MMOL/L — SIGNIFICANT CHANGE UP (ref 22–31)
CO2 SERPL-SCNC: 23 MMOL/L — SIGNIFICANT CHANGE UP (ref 22–31)
CREAT SERPL-MCNC: 1.22 MG/DL — SIGNIFICANT CHANGE UP (ref 0.5–1.3)
CREAT SERPL-MCNC: 1.29 MG/DL — SIGNIFICANT CHANGE UP (ref 0.5–1.3)
EGFR: 53 ML/MIN/1.73M2 — LOW
EGFR: 56 ML/MIN/1.73M2 — LOW
GLUCOSE BLDC GLUCOMTR-MCNC: 125 MG/DL — HIGH (ref 70–99)
GLUCOSE BLDC GLUCOMTR-MCNC: 170 MG/DL — HIGH (ref 70–99)
GLUCOSE BLDC GLUCOMTR-MCNC: 392 MG/DL — HIGH (ref 70–99)
GLUCOSE BLDC GLUCOMTR-MCNC: 75 MG/DL — SIGNIFICANT CHANGE UP (ref 70–99)
GLUCOSE BLDC GLUCOMTR-MCNC: 91 MG/DL — SIGNIFICANT CHANGE UP (ref 70–99)
GLUCOSE SERPL-MCNC: 112 MG/DL — HIGH (ref 70–99)
GLUCOSE SERPL-MCNC: 157 MG/DL — HIGH (ref 70–99)
HCT VFR BLD CALC: 38.3 % — SIGNIFICANT CHANGE UP (ref 34.5–45)
HGB BLD-MCNC: 11.5 G/DL — SIGNIFICANT CHANGE UP (ref 11.5–15.5)
INR BLD: 0.98 — SIGNIFICANT CHANGE UP (ref 0.88–1.16)
LACTATE SERPL-SCNC: 2.3 MMOL/L — HIGH (ref 0.5–2)
MAGNESIUM SERPL-MCNC: 1.9 MG/DL — SIGNIFICANT CHANGE UP (ref 1.6–2.6)
MAGNESIUM SERPL-MCNC: 2.2 MG/DL — SIGNIFICANT CHANGE UP (ref 1.6–2.6)
MCHC RBC-ENTMCNC: 23.9 PG — LOW (ref 27–34)
MCHC RBC-ENTMCNC: 30 GM/DL — LOW (ref 32–36)
MCV RBC AUTO: 79.5 FL — LOW (ref 80–100)
NRBC # BLD: 0 /100 WBCS — SIGNIFICANT CHANGE UP (ref 0–0)
PLATELET # BLD AUTO: 376 K/UL — SIGNIFICANT CHANGE UP (ref 150–400)
POTASSIUM SERPL-MCNC: 3.4 MMOL/L — LOW (ref 3.5–5.3)
POTASSIUM SERPL-MCNC: 3.6 MMOL/L — SIGNIFICANT CHANGE UP (ref 3.5–5.3)
POTASSIUM SERPL-SCNC: 3.4 MMOL/L — LOW (ref 3.5–5.3)
POTASSIUM SERPL-SCNC: 3.6 MMOL/L — SIGNIFICANT CHANGE UP (ref 3.5–5.3)
PROT SERPL-MCNC: 6.7 G/DL — SIGNIFICANT CHANGE UP (ref 6–8.3)
PROTHROM AB SERPL-ACNC: 11.6 SEC — SIGNIFICANT CHANGE UP (ref 10.5–13.4)
RBC # BLD: 4.82 M/UL — SIGNIFICANT CHANGE UP (ref 3.8–5.2)
RBC # FLD: 15.5 % — HIGH (ref 10.3–14.5)
SODIUM SERPL-SCNC: 138 MMOL/L — SIGNIFICANT CHANGE UP (ref 135–145)
SODIUM SERPL-SCNC: 140 MMOL/L — SIGNIFICANT CHANGE UP (ref 135–145)
WBC # BLD: 9.72 K/UL — SIGNIFICANT CHANGE UP (ref 3.8–10.5)
WBC # FLD AUTO: 9.72 K/UL — SIGNIFICANT CHANGE UP (ref 3.8–10.5)

## 2023-06-22 PROCEDURE — 99232 SBSQ HOSP IP/OBS MODERATE 35: CPT

## 2023-06-22 PROCEDURE — 93010 ELECTROCARDIOGRAM REPORT: CPT

## 2023-06-22 RX ORDER — MAGNESIUM SULFATE 500 MG/ML
2 VIAL (ML) INJECTION ONCE
Refills: 0 | Status: COMPLETED | OUTPATIENT
Start: 2023-06-22 | End: 2023-06-22

## 2023-06-22 RX ORDER — GABAPENTIN 400 MG/1
200 CAPSULE ORAL THREE TIMES A DAY
Refills: 0 | Status: DISCONTINUED | OUTPATIENT
Start: 2023-06-22 | End: 2023-06-23

## 2023-06-22 RX ORDER — POTASSIUM CHLORIDE 20 MEQ
40 PACKET (EA) ORAL EVERY 4 HOURS
Refills: 0 | Status: COMPLETED | OUTPATIENT
Start: 2023-06-22 | End: 2023-06-22

## 2023-06-22 RX ORDER — SODIUM CHLORIDE 9 MG/ML
1000 INJECTION INTRAMUSCULAR; INTRAVENOUS; SUBCUTANEOUS
Refills: 0 | Status: DISCONTINUED | OUTPATIENT
Start: 2023-06-23 | End: 2023-06-23

## 2023-06-22 RX ORDER — POTASSIUM CHLORIDE 20 MEQ
40 PACKET (EA) ORAL ONCE
Refills: 0 | Status: COMPLETED | OUTPATIENT
Start: 2023-06-22 | End: 2023-06-22

## 2023-06-22 RX ORDER — DIPHENHYDRAMINE HCL 50 MG
50 CAPSULE ORAL
Refills: 0 | Status: DISCONTINUED | OUTPATIENT
Start: 2023-06-22 | End: 2023-06-22

## 2023-06-22 RX ORDER — HYDROMORPHONE HYDROCHLORIDE 2 MG/ML
1 INJECTION INTRAMUSCULAR; INTRAVENOUS; SUBCUTANEOUS ONCE
Refills: 0 | Status: DISCONTINUED | OUTPATIENT
Start: 2023-06-22 | End: 2023-06-22

## 2023-06-22 RX ORDER — ACETAMINOPHEN 500 MG
650 TABLET ORAL EVERY 6 HOURS
Refills: 0 | Status: DISCONTINUED | OUTPATIENT
Start: 2023-06-22 | End: 2023-06-24

## 2023-06-22 RX ORDER — INSULIN GLARGINE 100 [IU]/ML
10 INJECTION, SOLUTION SUBCUTANEOUS AT BEDTIME
Refills: 0 | Status: DISCONTINUED | OUTPATIENT
Start: 2023-06-22 | End: 2023-06-23

## 2023-06-22 RX ORDER — METOPROLOL TARTRATE 50 MG
25 TABLET ORAL ONCE
Refills: 0 | Status: COMPLETED | OUTPATIENT
Start: 2023-06-22 | End: 2023-06-22

## 2023-06-22 RX ORDER — DIPHENHYDRAMINE HCL 50 MG
50 CAPSULE ORAL
Refills: 0 | Status: DISCONTINUED | OUTPATIENT
Start: 2023-06-23 | End: 2023-06-23

## 2023-06-22 RX ORDER — SODIUM CHLORIDE 9 MG/ML
1000 INJECTION INTRAMUSCULAR; INTRAVENOUS; SUBCUTANEOUS
Refills: 0 | Status: DISCONTINUED | OUTPATIENT
Start: 2023-06-22 | End: 2023-06-22

## 2023-06-22 RX ADMIN — Medication 5 MILLIGRAM(S): at 22:25

## 2023-06-22 RX ADMIN — Medication 5 MILLIGRAM(S): at 07:14

## 2023-06-22 RX ADMIN — Medication 10 UNIT(S): at 17:55

## 2023-06-22 RX ADMIN — ATORVASTATIN CALCIUM 20 MILLIGRAM(S): 80 TABLET, FILM COATED ORAL at 22:26

## 2023-06-22 RX ADMIN — Medication 40 MILLIEQUIVALENT(S): at 16:09

## 2023-06-22 RX ADMIN — Medication 5 MILLIGRAM(S): at 13:11

## 2023-06-22 RX ADMIN — HYDROMORPHONE HYDROCHLORIDE 1 MILLIGRAM(S): 2 INJECTION INTRAMUSCULAR; INTRAVENOUS; SUBCUTANEOUS at 23:40

## 2023-06-22 RX ADMIN — Medication 50 MILLIGRAM(S): at 22:26

## 2023-06-22 RX ADMIN — Medication 81 MILLIGRAM(S): at 13:13

## 2023-06-22 RX ADMIN — GABAPENTIN 200 MILLIGRAM(S): 400 CAPSULE ORAL at 22:26

## 2023-06-22 RX ADMIN — Medication 40 MILLIEQUIVALENT(S): at 07:14

## 2023-06-22 RX ADMIN — Medication 40 MILLIEQUIVALENT(S): at 02:54

## 2023-06-22 RX ADMIN — Medication 25 GRAM(S): at 02:54

## 2023-06-22 RX ADMIN — Medication 25 MILLIGRAM(S): at 08:40

## 2023-06-22 RX ADMIN — SODIUM CHLORIDE 150 MILLILITER(S): 9 INJECTION INTRAMUSCULAR; INTRAVENOUS; SUBCUTANEOUS at 10:30

## 2023-06-22 RX ADMIN — Medication 10 UNIT(S): at 13:09

## 2023-06-22 RX ADMIN — GABAPENTIN 600 MILLIGRAM(S): 400 CAPSULE ORAL at 07:14

## 2023-06-22 RX ADMIN — HEPARIN SODIUM 1800 UNIT(S)/HR: 5000 INJECTION INTRAVENOUS; SUBCUTANEOUS at 16:10

## 2023-06-22 RX ADMIN — GABAPENTIN 200 MILLIGRAM(S): 400 CAPSULE ORAL at 16:09

## 2023-06-22 RX ADMIN — HYDROMORPHONE HYDROCHLORIDE 1 MILLIGRAM(S): 2 INJECTION INTRAMUSCULAR; INTRAVENOUS; SUBCUTANEOUS at 11:45

## 2023-06-22 RX ADMIN — Medication 10: at 22:27

## 2023-06-22 RX ADMIN — LORATADINE 10 MILLIGRAM(S): 10 TABLET ORAL at 13:10

## 2023-06-22 RX ADMIN — Medication 12: at 17:56

## 2023-06-22 RX ADMIN — Medication 1 TABLET(S): at 13:11

## 2023-06-22 RX ADMIN — INSULIN GLARGINE 10 UNIT(S): 100 INJECTION, SOLUTION SUBCUTANEOUS at 22:26

## 2023-06-22 RX ADMIN — HEPARIN SODIUM 1800 UNIT(S)/HR: 5000 INJECTION INTRAVENOUS; SUBCUTANEOUS at 20:36

## 2023-06-22 RX ADMIN — HYDROMORPHONE HYDROCHLORIDE 1 MILLIGRAM(S): 2 INJECTION INTRAMUSCULAR; INTRAVENOUS; SUBCUTANEOUS at 23:55

## 2023-06-22 RX ADMIN — HYDROMORPHONE HYDROCHLORIDE 1 MILLIGRAM(S): 2 INJECTION INTRAMUSCULAR; INTRAVENOUS; SUBCUTANEOUS at 11:08

## 2023-06-22 RX ADMIN — Medication 2: at 13:09

## 2023-06-22 NOTE — PROGRESS NOTE ADULT - ASSESSMENT
IMPRESSION:  43 year old female with serial readmissions for UTI.  She was recently admitted for presumed UTI with CRE Klebsiella and E. coli, s/p course of gentamicin -6/5 and ertapenem -6/9 now presenting with chest pain, HTN; no overt urinary symptoms or changes in leakage from around SPT. Elevated WBC count on admission spontaneously improved (without antibiotics). No fevers. Elevated lactate noted (nonspecific) however would be reluctant to classify current presentation as urosepsis.     Recommend:  1.  Continue to observe off antibiotics  2. Follow up blood and urine cultures.  A positive urine cultures does not necessarily imply infection  3.  Monitor clinically.  If she develops signs of sepsis. then would be reasonable to trial tigecycline (given recent aminoglycoside and carbapenem exposure)--would dose 100mg IV loading dose X 1, followed by 50mg IV q12h (12h after loading dose)    ID team 2 will follow

## 2023-06-22 NOTE — PROGRESS NOTE ADULT - PROBLEM SELECTOR PLAN 11
- persistent L>R UE and LE weakness and paresthesias  - s/p dilaudid in ED and home dilaudid, however hold due to prolonged QTc  - Home gabapentin 600mg TID --> c/w renally dosed BID 600mg for now  - home oxycodone 10mg from last admission --> renally dosed at 5mg q8h for now, can uptitrate as needed -180, elevated BP likely 2/2 agitation/Anxiety this AM  -Holding home Losartan 2/2 LORA  -c/w Toprol XL 50mg QHS    DVT PPX:  Hep    F: NS IVF  E: K>4, Mg>2  N: DASH/TLC, NPO pMN    Dispo:  Pending medical progression

## 2023-06-22 NOTE — PROGRESS NOTE ADULT - PROBLEM SELECTOR PLAN 7
- In ED, CT Abdomen without acute findings  - Ostomy RN consulted, f/u recs  - abdomen diffusely tender however nonrigid and nonguarding - c/w serial abd exams  - consider Pain Mgmt consult H/o:  Abdomen diffusely tender, exam unremarkable  -CT Abdomen without acute findings  -LLL Ostomy bag in place, C/D/I  -Ostomy RN consulted, f/u recs

## 2023-06-22 NOTE — PROGRESS NOTE ADULT - PROBLEM SELECTOR PLAN 2
- DKA vs HHS vs uncontrolled hyperglycemia i/s/o SIRS  - A1c 11.9%   - Endocrine consulted and following  - hyperglycemia >550 -> initiated on Lantus 70units BID (received x1); FSBG downtrended to 300-->127-> then overcorrected to 54mg/dL. Pt was alert and felt jittery, resolved s/p PO Dextrose and juice intake; Per discussion with Endocrinology, Lantus discontinued until reassessment in AM 6/22.   - Anion gap 24 on admission, now closing at 17.  - BHB 0.5--> 0.1  - continue to monitor lytes and FSBG closely  - continue Admelog 10units TIDAC and moderate ISS as d/w Endocrine. Hyperglycemia, BG >550: Uncontrolled hyperglycemia i/s/o SIRS  -A1c 11.9%   -Anion gap 24 on admission, now closing at 17.  -BHB 0.5--> 0.1  -Endocrine consulted. Initiated on Lantus 70units BID (received x1); overcorrected to 54mg/dL, s/p OJ with improvement in BG and Sxs. Lantus overnight with reassessment by Endo on 6/22  -Per Endo, can give Lantus 20 units STAT in AM if receiving solumedrol for CCTA  -c/w lispro 10U TIDAC, mISS qid  -Goal 100-180 mg/dL.    -Endo will follow until discharge.  see recs Hyperglycemia, BG >550: Uncontrolled hyperglycemia i/s/o SIRS  -A1c 11.9%   -Anion gap 24 on admission, now closing at 17.  -BHB 0.5--> 0.1  -Endocrine consulted. Initiated on Lantus 70units BID (received x1); overcorrected to 54mg/dL, s/p OJ with improvement in BG and Sxs. Lantus overnight with reassessment by Endo on 6/22  -Per Endo, can give add Lantus 20 units STAT in AM if receiving solumedrol for CCTA  -Restarted Lantus 10mg QHS  -c/w lispro 10U TIDAC, mISS qid  -Goal 100-180 mg/dL.    -Endo will follow until discharge.  see recs

## 2023-06-22 NOTE — PROGRESS NOTE ADULT - PROBLEM SELECTOR PLAN 10
- CTPA in ED w/o PE  (hx PE 04/23, on Eliquis at home)  - Hep gtt here initiated  - monitor PTT Persistent L>R UE and LE weakness and paresthesias  -s/p dilaudid 1mg Stat x 1, monitor closely, prolonged QTc on admit  -Gabapentin decreased to 200mg TID and Oxycodone 5mg q8h PRN

## 2023-06-22 NOTE — PROGRESS NOTE ADULT - PROBLEM SELECTOR PLAN 3
- repeated hypokalemia despite thorough repletion today, continue to follow BMP q2-4h until stabilizes: next at 10pm  - likely secondary to uncontrolled/labile blood glucose Persistent hypokalemia, despite multiple repletion on 6/21  -K 3.4, repleted with KCL 40mEQ PO x1,  will give add KCL 40 mEQ   -Magnesium 2.2  -Hypokalemia 2/2 hypeglycemia  -Monitor electrolytes closely Persistent hypokalemia, despite multiple repletion on 6/21  -K 3.4, repleted with KCL 40mEQ PO x1 AM,  will give add KCL 40 mEQ   -Magnesium 2.2  -Hypokalemia 2/2 hyperglycemia  -Monitor electrolytes closely
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
## 2023-06-22 NOTE — PROGRESS NOTE ADULT - ASSESSMENT
44yo woman w/ hx asthma, hx PE on home Eliquis, DM, HTN, HLD, hx abdominal necrotizing fasciitis s/p suprapubic catheter, ostomy placement in 11/2021 who presented to [facility] ED from home with worsening abdominal and suprapubic pain and drainage from prior suprapubic catheter insertion site, recent admission 5/23-6/1 for sepsis 2/2 UTI and catheter-site infection on gentamicin and ertapenem, who presents for acute chest pain w/ elevated troponins, medicine team consulted for management of severe sepsis and hyperglycemia.    Plan/Recommendations:  #Severe sepsis  On prior admission 5-23-6/1 pt had presented with worsening lower abd pain, drainage from suprapubic catheter site noted in setting of elevated lactate 5.3 in setting of severe sepsis 2/2 UTI from suprapubic catheter. At that time, UCx (5/22) growing carbapenem-resistant Klebsiella & E. Coli. Blood cultures (5/23) NGTD. S/p suprapubic catheter replacement by IR on 5/25 and since treated w/ course gentamicin and ertapenem via PICC on discharge. On current admission, patient presenting with persistent pain symptoms, lactate 7.3 on admission improved w/ IVF since elevated again back up to 7.4.  - agree w/ trial off antibiotics per ID recs  - trend lactate q6hrs to clear  - f/u BCx x2 and UCx x1  - appreciate ID recs    #Poorly-controlled IDDM  A1c 11.9, noted on admission for FSGs in 500s.  - Continue lispro 10 units before each meal.  - mISS  - appreciate endocrine recs    #R/O NSTEMI  Patient presenting w/ acute substernal chest pain w/ mildly elevated high sensitivity troponins mid 50s. Otherwise no ST changes noted on ECG.  - agree with admission to cardiac telemetry for possible ischemic eval  - f/u CCTA per cardiology team    #Suprapubic/Abd pain  - agree with IR consult to evaluate removal of suprapubic catheter per patient's wishes  - agree w/ oxybutynin 5mg TID PRN for bladder spasms  - c/w prior pain regimen: Tylenol 975mg q8hrs, Ibuprofen 200mg q6hrs, Oxycodone IR PRN (moderate 5mg q6h, severe 10mg q4h), lidocaine patch for R midback paraspinal tenderness    #Hx necrotizing fasciitis s/p ostomy  Hx of necrotizing fasciitis tx at [facility] in 11/2021 with hospital course c/b bowel resection and ostomy formation. On ROS, no increased output from ostomy bag and site appears clean/dry.  - Routine ostomy care  - Monitor ostomy output  - General surgery outpatient appointment for possible ostomy reversal.    #HTN  Home medications include Labetalol 100 mg BID and Losartan 50mg Q24.  - agree with holding home meds for now in setting of severe sepsis    #Hx Pulmonary embolism  History of bilateral pulmonary emboli in segmental and subsegmental branches on the R and subsegmental branches on the L, diagnosed on CT chest during ED visit on 4/7/23. Home meds: eliquis 5mg BID  - c/w eliquis 5mg BID    #Asthma  - Continue home Ventolin Q24 PRN

## 2023-06-22 NOTE — GOALS OF CARE CONVERSATION - ADVANCED CARE PLANNING - CONVERSATION DETAILS
Incomplete    GOC discussed with patien.  Discussion included but not limited to who is Health Care Proxy, Code status, MOLST, diagnosis, prognosis, treatment options, risks and benefits, comfort measures, pain management.       Patient to be FULL CODE

## 2023-06-22 NOTE — PROGRESS NOTE ADULT - ASSESSMENT
42 y/o female with PMHx Asthma, CVA (11/2021; residual L>R weakness), PE (4/2023 on Eliquis), uncontrolled DM-2 (on insulin), HTN, HLD, hx abdominal necrotizing fasciitis (s/p suprapubic catheter, ostomy placement in 11/2021 with intubation from 11-12/2021), hx of frequent UTIs 2/2 to suprapubic catheter, most recent UTI c/b Urosepsis admitted [facility] 5/23-6/1 (Klebsiella and Ecoli, dc'd with left midline for tx w/ abx)  who presents to [facility] ED on 6/20/23, complaining of abdominal pain, nausea, chest pressure, SOB, blurry vision, and anorexia x3 days, found to be hyperglycemic to 600s, admitted to cardiology for R/o ACS, Endocrine consulted for hyperglycemia, Medicine and ID co-managing for management of SIRS.  42 y/o female with PMHx Asthma, CVA (11/2021; residual L>R weakness), PE (4/2023 on Eliquis), uncontrolled DM-2 (on insulin), HTN, HLD, hx abdominal necrotizing fasciitis (s/p suprapubic catheter, ostomy placement in 11/2021 with intubation from 11-12/2021), hx of frequent UTIs 2/2 to suprapubic catheter, most recent UTI c/b Urosepsis admitted [facility] 5/23-6/1 (Klebsiella and Ecoli, dc'd with left midline for tx w/ abx). Pt presenting to [facility] ED on 6/20/23, complaining of abdominal pain, nausea, chest pressure, SOB, blurry vision, and anorexia x3 days, found to be hyperglycemic to 600s, admitted to cardiology for R/O ACS. Endocrine consulted for hyperglycemia, Medicine and ID co-managing for management of SIRS. Ischemic eval with planned CCTA 6/23.

## 2023-06-22 NOTE — PROGRESS NOTE ADULT - PROBLEM SELECTOR PLAN 8
- Prior admission 5/22-6/1 for UTI of suprapubic catheter,  ESBL Klebsiella  - UA as above. F/u UCx (in lab)  - diffusely tender abdomen worse in suprapubic area; c/w serial abdominal exams  - CT abd nonrevealing as above  - c/w home oxybutinin   - Per ID, will consult  Service in AM to discuss removal of suprapubic catheter due to risk of infection Prior admission 5/22-6/1 for UTI of suprapubic catheter,  ESBL Klebsiella  -UA/UCxs as above  -See ID recs above  -CT abd findings as above  -Per ID, wants suprapubic removed as it is likely the source of infection  - consulted, deferred case to IR as suprapubic cath was placed by them ( 2 months ago)  -IR paged multiple times suprapubic cath removal, no answer  -Will start TOV once catheter is removed and Bladder Scan q6h  -c/w home oxybutinin Prior admission 5/22-6/1 for UTI of suprapubic catheter,  ESBL Klebsiella  -UA/UCxs as above  -See ID recs above  -CT abd findings as above  -Per ID, wants suprapubic removed as it is likely the source of infection  -Per ID, monitor off Abx.  + UCx does not necessarily imply infection. If signs of sepsis, can give trial tigecycline (given recent aminoglycoside and carbapenem exposure)--would dose 100mg IV loading dose X 1, followed by 50mg IV q12h (12h after loading dose)  - consulted, deferred case to IR as suprapubic cath was placed by them ( 2 months ago)  -IR consulted for suprapubic cath removal in AM  -Will start TOV once catheter is removed and Bladder Scan q6h  -c/w home oxybutinin

## 2023-06-22 NOTE — PROGRESS NOTE ADULT - PROBLEM SELECTOR PLAN 5
- SCr up to 1.86 initial ED labs, improving s/p IVF --> 1.46 --> 1.3  - c/w IVF  - avoid nephrotoxins; renally dose all meds  - s/p CT dye load in ED, c/w hydration   - Consult Renal if LORA worsens H/o CKD,  ing s/p IVF --> 1.46 --> 1.3  - c/w IVF  - avoid nephrotoxins; renally dose all meds  - s/p CT dye load in ED, c/w hydration   - Consult Renal if LORA worsens H/o CKD, sCR up to 1.86,   -Admit Cr 1.3, LORA likely prenal, c/w hydration, Cr 1.2 improving  -consider renal consult/work-up if Cr worsens  -Avoid Nephrotoxins

## 2023-06-22 NOTE — PROGRESS NOTE ADULT - PROBLEM SELECTOR PLAN 1
Type 2 diabetes mellitus with hyperglycemia  - Would give Lantus 15 units STAT this AM since she will be receiving solumedrol.  - Continue lispro 10 units before each meal.  - Continue lispro moderate dose sliding scale before meals and at bedtime.  - Patient's fingerstick glucose goal is 100-180 mg/dL.    - For discharge, patient can U500 and admelog, dose TBD.  - Patient can follow up at discharge with [facility] by calling [phone] to make an appointment.      Case discussed with Dr. Rose. Primary team updated. Type 2 diabetes mellitus with hyperglycemia  - Would give Lantus 20 units STAT this AM since she will be receiving solumedrol.  - Continue lispro 10 units before each meal.  - Continue lispro moderate dose sliding scale before meals and at bedtime.  - Patient's fingerstick glucose goal is 100-180 mg/dL.    - For discharge, patient can U500 and admelog, dose TBD.  - Patient can follow up at discharge with [facility] by calling [phone] to make an appointment.      Case discussed with Dr. Rose. Primary team updated. Type 2 diabetes mellitus with hyperglycemia  - Would give Lantus 10 units at bedtime. Will decide on AM dose based on fasting Bg and if she is ordered for solumedrol tomorrow.  - Continue lispro 10 units before each meal.  - Continue lispro moderate dose sliding scale before meals and at bedtime.  - Patient's fingerstick glucose goal is 100-180 mg/dL.    - For discharge, patient can U500 and admelog, dose TBD.  - Patient can follow up at discharge with [facility] by calling [phone] to make an appointment.      Case discussed with Dr. Rose. Primary team updated.

## 2023-06-22 NOTE — PROGRESS NOTE ADULT - ASSESSMENT
43F w/ asthma, PE (on eliquis), HTN, DM, HLD, abdominal nec fasc (s/p suprapubic catheter and ostomy on Nov 2021), and recent admission to [facility] 5/22-6/1 for UTI, presenting with left sided chest pain for 1 day, found to have negative cardiac work-up, persistent hyperglycemia, ICU consulted for transfer to medical telemetry.    A1C: 11.9 %  BUN: 22  Creatinine: 1.22  GFR: 56  Weight: 101.2  BMI: 34.9
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
## 2023-06-22 NOTE — PROGRESS NOTE ADULT - PROBLEM SELECTOR PLAN 1
- SIRS criteria met with lactate, WBC, tachycardia  - afebrile, mildly tachycardic, nontoxic appearing, continue to monitor closely  - WBC 16.8K initially, now improved to 8.8 w/ only IVF- possibly leukocytosis due to dehydration   - Lactate 7 --> 3.5  -> back up to 7,  now s/p total 5L NS with current lactate 2.8- f/u repeat at 10pm   - ESR 48 and CRP 32.5  - f/u Blood cultures - NGTD x12 hours  - UA +WBCs, trace leuk esterase, no nitrite, +bacteria, moderate blood, and >1000 glucose; f/u urine culture (in lab)   - CTPE in ED negative for PE and CT Abdomen without acute findings  - s/p recent admission at [facility] for Urosepsis (Klebsiella and Ecoli discharged with left midline for tx w/ Ertapenem 6/9 and Gentamicin 6/5; pt reports missing two days tx of Ertapenem due to midline malfunction; removed ~1week ago by home care RN)  - discussed with Medicine: will dose Meropenem x1 now, dose discussed with Pharmacy (1gram)  - ID consulted: recommend watch off antibiotics   - ID recs consult  for removal of suprapubic catheter due to infectious risk -SIRS criteria met with lactate, WBC, tachycardia, remains afebrile; now resolving    -Initial WBC 16.8K, likely iso dehydration, IVF ongoing, resolved (WBC 9.72 today)  -Lactate 7, s/p IV hydration, improved 2.3.  ESR 48 and CRP 32.5  -BCxs: NGTD x 24h   -UA:  Mildly positive: +WBCs, trace leuks, neg nitrite, +bacteria. +UCxs   -CT PE:  no PE.  CT Abdomen shows acute findings  -s/p recent admission at [facility] for Urosepsis (Klebsiella and Ecoli discharged with left midline for tx w/ Ertapenem 6/9 and Gentamicin 6/5; pt reports missing two days tx of Ertapenem due to midline malfunction; removed ~1week ago by home care RN)  -IM consulted for co-management:  s/p Meropenem x1   -ID consulted: recs monitor off antibiotics and remove suprapubic catheter due to infectious risks  - consulted, deferred case to IR, suprapubic cath was placed by them ( 2 months ago)  -IR paged for removal of suprapubic cath   -Will start TOV once catheter is removed, Bladder Scan q6h -SIRS criteria met with lactate, WBC, tachycardia, remains afebrile; now resolving    -Initial WBC 16.8K, likely iso dehydration, IVF ongoing, resolved (WBC 9.72 today)  -Lactate 7, s/p IV hydration, improved 2.3.  ESR 48 and CRP 32.5  -BCxs: NGTD x 24h   -UA:  Mildly positive: +WBCs, trace leuks, neg nitrite, +bacteria. +UCxs   -CT PE:  no PE.  CT Abdomen shows acute findings  -s/p recent admission at [facility] for Urosepsis (Klebsiella and Ecoli discharged with left midline for tx w/ Ertapenem 6/9 and Gentamicin 6/5; pt reports missing two days tx of Ertapenem due to midline malfunction; removed ~1week ago by home care RN)  -IM consulted for co-management:  s/p Meropenem x1   -ID consulted: recs monitor off antibiotics and remove suprapubic catheter due to infectious risks
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
## 2023-06-22 NOTE — PROGRESS NOTE ADULT - SUBJECTIVE AND OBJECTIVE BOX
INTERVAL HPI/OVERNIGHT EVENTS:    Patient was seen and examined at bedside.  Afebrile     CONSTITUTIONAL:  Negative fever or chills, feels well, good appetite  EYES:  Negative  blurry vision or double vision  CARDIOVASCULAR:  Negative for chest pain or palpitations  RESPIRATORY:  Negative for cough, wheezing, or SOB   GASTROINTESTINAL:  Negative for nausea, vomiting, diarrhea, constipation, or abdominal pain  GENITOURINARY:  Negative frequency, urgency or dysuria  NEUROLOGIC:  No headache, confusion, dizziness, lightheadedness      ANTIBIOTICS/RELEVANT:    MEDICATIONS  (STANDING):  aspirin enteric coated 81 milliGRAM(s) Oral daily  atorvastatin 20 milliGRAM(s) Oral at bedtime  dextrose 5%. 1000 milliLiter(s) (50 mL/Hr) IV Continuous <Continuous>  dextrose 5%. 1000 milliLiter(s) (100 mL/Hr) IV Continuous <Continuous>  dextrose 50% Injectable 12.5 Gram(s) IV Push once  dextrose 50% Injectable 25 Gram(s) IV Push once  dextrose 50% Injectable 25 Gram(s) IV Push once  gabapentin 200 milliGRAM(s) Oral three times a day  glucagon  Injectable 1 milliGRAM(s) IntraMuscular once  heparin  Infusion.  Unit(s)/Hr (18 mL/Hr) IV Continuous <Continuous>  insulin lispro (ADMELOG) corrective regimen sliding scale   SubCutaneous Before meals and at bedtime  insulin lispro Injectable (ADMELOG) 10 Unit(s) SubCutaneous three times a day before meals  lactobacillus acidophilus 1 Tablet(s) Oral daily  loratadine 10 milliGRAM(s) Oral daily  metoprolol succinate ER 50 milliGRAM(s) Oral at bedtime  oxybutynin 5 milliGRAM(s) Oral three times a day  potassium chloride    Tablet ER 40 milliEquivalent(s) Oral once  sodium chloride 0.9%. 1000 milliLiter(s) (150 mL/Hr) IV Continuous <Continuous>    MEDICATIONS  (PRN):  acetaminophen     Tablet .. 650 milliGRAM(s) Oral every 6 hours PRN Mild Pain (1 - 3)  albuterol    90 MICROgram(s) HFA Inhaler 2 Puff(s) Inhalation every 6 hours PRN Shortness of Breath and/or Wheezing  dextrose Oral Gel 15 Gram(s) Oral once PRN Blood Glucose LESS THAN 70 milliGRAM(s)/deciliter  oxyCODONE    IR 5 milliGRAM(s) Oral every 8 hours PRN Moderate Pain (4 - 6)        Vital Signs Last 24 Hrs  T(C): 36.6 (22 Jun 2023 14:06), Max: 36.6 (22 Jun 2023 14:06)  T(F): 97.9 (22 Jun 2023 14:06), Max: 97.9 (22 Jun 2023 14:06)  HR: 105 (22 Jun 2023 08:39) (88 - 105)  BP: 185/96 (22 Jun 2023 08:39) (119/73 - 185/96)  BP(mean): 89 (21 Jun 2023 18:01) (89 - 89)  RR: 18 (22 Jun 2023 08:39) (18 - 18)  SpO2: 98% (22 Jun 2023 08:39) (97% - 98%)    Parameters below as of 22 Jun 2023 08:39  Patient On (Oxygen Delivery Method): room air        PHYSICAL EXAM:  Constitutional: non-toxic, no distress  Eyes:NAOMI, EOMI  Ear/Nose/Throat: no oral lesion, no sinus tenderness on percussion	  Neck:  supple  Respiratory: CTA cristi  Cardiovascular: S1S2 RRR, no murmurs  Gastrointestinal:soft, (+) BS, no HSM, + suprapubic catheter   Extremities:no e/e/c  Vascular: DP Pulse:	right normal; left normal      LABS:                        11.5   9.72  )-----------( 376      ( 22 Jun 2023 05:30 )             38.3     06-22    140  |  108  |  22  ----------------------------<  112<H>  3.6   |  22  |  1.22    Ca    8.2<L>      22 Jun 2023 05:30  Mg     2.2     06-22    TPro  6.7  /  Alb  3.2<L>  /  TBili  <0.2  /  DBili  x   /  AST  14  /  ALT  8<L>  /  AlkPhos  124<H>  06-22    PT/INR - ( 22 Jun 2023 05:30 )   PT: 11.6 sec;   INR: 0.98          PTT - ( 22 Jun 2023 05:30 )  PTT:69.7 sec  Urinalysis Basic - ( 22 Jun 2023 05:30 )    Color: x / Appearance: x / SG: x / pH: x  Gluc: 112 mg/dL / Ketone: x  / Bili: x / Urobili: x   Blood: x / Protein: x / Nitrite: x   Leuk Esterase: x / RBC: x / WBC x   Sq Epi: x / Non Sq Epi: x / Bacteria: x        MICROBIOLOGY:    Culture - Blood (06.21.23 @ 07:10)    Specimen Source: .Blood Blood-Peripheral   Culture Results:   No growth at 1 day.    Culture - Urine (06.21.23 @ 02:37)    Specimen Source: Catheterized None   Culture Results:   Culture in progress        RADIOLOGY & ADDITIONAL STUDIES:

## 2023-06-23 LAB
ANION GAP SERPL CALC-SCNC: 13 MMOL/L — SIGNIFICANT CHANGE UP (ref 5–17)
APTT BLD: 78.2 SEC — HIGH (ref 27.5–35.5)
BUN SERPL-MCNC: 21 MG/DL — SIGNIFICANT CHANGE UP (ref 7–23)
CALCIUM SERPL-MCNC: 7.3 MG/DL — LOW (ref 8.4–10.5)
CHLORIDE SERPL-SCNC: 101 MMOL/L — SIGNIFICANT CHANGE UP (ref 96–108)
CO2 SERPL-SCNC: 17 MMOL/L — LOW (ref 22–31)
CREAT SERPL-MCNC: 1.16 MG/DL — SIGNIFICANT CHANGE UP (ref 0.5–1.3)
EGFR: 60 ML/MIN/1.73M2 — SIGNIFICANT CHANGE UP
GLUCOSE BLDC GLUCOMTR-MCNC: 323 MG/DL — HIGH (ref 70–99)
GLUCOSE BLDC GLUCOMTR-MCNC: 430 MG/DL — HIGH (ref 70–99)
GLUCOSE BLDC GLUCOMTR-MCNC: 514 MG/DL — CRITICAL HIGH (ref 70–99)
GLUCOSE BLDC GLUCOMTR-MCNC: 536 MG/DL — CRITICAL HIGH (ref 70–99)
GLUCOSE BLDC GLUCOMTR-MCNC: 544 MG/DL — CRITICAL HIGH (ref 70–99)
GLUCOSE BLDC GLUCOMTR-MCNC: 546 MG/DL — CRITICAL HIGH (ref 70–99)
GLUCOSE BLDC GLUCOMTR-MCNC: 576 MG/DL — CRITICAL HIGH (ref 70–99)
GLUCOSE BLDC GLUCOMTR-MCNC: 599 MG/DL — CRITICAL HIGH (ref 70–99)
GLUCOSE SERPL-MCNC: 446 MG/DL — HIGH (ref 70–99)
HCT VFR BLD CALC: 35.8 % — SIGNIFICANT CHANGE UP (ref 34.5–45)
HGB BLD-MCNC: 10.8 G/DL — LOW (ref 11.5–15.5)
MAGNESIUM SERPL-MCNC: 1.4 MG/DL — LOW (ref 1.6–2.6)
MCHC RBC-ENTMCNC: 24.1 PG — LOW (ref 27–34)
MCHC RBC-ENTMCNC: 30.2 GM/DL — LOW (ref 32–36)
MCV RBC AUTO: 79.9 FL — LOW (ref 80–100)
NRBC # BLD: 0 /100 WBCS — SIGNIFICANT CHANGE UP (ref 0–0)
PLATELET # BLD AUTO: 340 K/UL — SIGNIFICANT CHANGE UP (ref 150–400)
POTASSIUM SERPL-MCNC: 4.4 MMOL/L — SIGNIFICANT CHANGE UP (ref 3.5–5.3)
POTASSIUM SERPL-SCNC: 4.4 MMOL/L — SIGNIFICANT CHANGE UP (ref 3.5–5.3)
RBC # BLD: 4.48 M/UL — SIGNIFICANT CHANGE UP (ref 3.8–5.2)
RBC # FLD: 15.6 % — HIGH (ref 10.3–14.5)
SODIUM SERPL-SCNC: 131 MMOL/L — LOW (ref 135–145)
WBC # BLD: 6.37 K/UL — SIGNIFICANT CHANGE UP (ref 3.8–10.5)
WBC # FLD AUTO: 6.37 K/UL — SIGNIFICANT CHANGE UP (ref 3.8–10.5)

## 2023-06-23 PROCEDURE — 87040 BLOOD CULTURE FOR BACTERIA: CPT

## 2023-06-23 PROCEDURE — 36415 COLL VENOUS BLD VENIPUNCTURE: CPT

## 2023-06-23 PROCEDURE — 75574 CT ANGIO HRT W/3D IMAGE: CPT | Mod: 26

## 2023-06-23 PROCEDURE — 99232 SBSQ HOSP IP/OBS MODERATE 35: CPT

## 2023-06-23 PROCEDURE — 76937 US GUIDE VASCULAR ACCESS: CPT | Mod: 26,AS

## 2023-06-23 RX ORDER — INSULIN LISPRO 100/ML
20 VIAL (ML) SUBCUTANEOUS
Refills: 0 | Status: DISCONTINUED | OUTPATIENT
Start: 2023-06-23 | End: 2023-06-24

## 2023-06-23 RX ORDER — HYDROCORTISONE 20 MG
200 TABLET ORAL ONCE
Refills: 0 | Status: COMPLETED | OUTPATIENT
Start: 2023-06-23 | End: 2023-06-23

## 2023-06-23 RX ORDER — METOPROLOL TARTRATE 50 MG
100 TABLET ORAL ONCE
Refills: 0 | Status: COMPLETED | OUTPATIENT
Start: 2023-06-23 | End: 2023-06-23

## 2023-06-23 RX ORDER — HYDROMORPHONE HYDROCHLORIDE 2 MG/ML
1 INJECTION INTRAMUSCULAR; INTRAVENOUS; SUBCUTANEOUS ONCE
Refills: 0 | Status: DISCONTINUED | OUTPATIENT
Start: 2023-06-23 | End: 2023-06-23

## 2023-06-23 RX ORDER — INSULIN GLARGINE 100 [IU]/ML
40 INJECTION, SOLUTION SUBCUTANEOUS AT BEDTIME
Refills: 0 | Status: DISCONTINUED | OUTPATIENT
Start: 2023-06-23 | End: 2023-06-24

## 2023-06-23 RX ORDER — HYDROMORPHONE HYDROCHLORIDE 2 MG/ML
0.5 INJECTION INTRAMUSCULAR; INTRAVENOUS; SUBCUTANEOUS ONCE
Refills: 0 | Status: DISCONTINUED | OUTPATIENT
Start: 2023-06-23 | End: 2023-06-23

## 2023-06-23 RX ORDER — GABAPENTIN 400 MG/1
300 CAPSULE ORAL THREE TIMES A DAY
Refills: 0 | Status: DISCONTINUED | OUTPATIENT
Start: 2023-06-23 | End: 2023-06-24

## 2023-06-23 RX ORDER — INSULIN GLARGINE 100 [IU]/ML
30 INJECTION, SOLUTION SUBCUTANEOUS ONCE
Refills: 0 | Status: COMPLETED | OUTPATIENT
Start: 2023-06-23 | End: 2023-06-23

## 2023-06-23 RX ORDER — INSULIN LISPRO 100/ML
20 VIAL (ML) SUBCUTANEOUS ONCE
Refills: 0 | Status: COMPLETED | OUTPATIENT
Start: 2023-06-23 | End: 2023-06-23

## 2023-06-23 RX ORDER — DIPHENHYDRAMINE HCL 50 MG
50 CAPSULE ORAL ONCE
Refills: 0 | Status: COMPLETED | OUTPATIENT
Start: 2023-06-23 | End: 2023-06-23

## 2023-06-23 RX ORDER — MAGNESIUM SULFATE 500 MG/ML
2 VIAL (ML) INJECTION ONCE
Refills: 0 | Status: COMPLETED | OUTPATIENT
Start: 2023-06-23 | End: 2023-06-23

## 2023-06-23 RX ORDER — INSULIN GLARGINE 100 [IU]/ML
30 INJECTION, SOLUTION SUBCUTANEOUS EVERY MORNING
Refills: 0 | Status: DISCONTINUED | OUTPATIENT
Start: 2023-06-23 | End: 2023-06-24

## 2023-06-23 RX ORDER — SODIUM CHLORIDE 9 MG/ML
1000 INJECTION INTRAMUSCULAR; INTRAVENOUS; SUBCUTANEOUS
Refills: 0 | Status: DISCONTINUED | OUTPATIENT
Start: 2023-06-23 | End: 2023-06-24

## 2023-06-23 RX ADMIN — HEPARIN SODIUM 1800 UNIT(S)/HR: 5000 INJECTION INTRAVENOUS; SUBCUTANEOUS at 08:29

## 2023-06-23 RX ADMIN — Medication 25 GRAM(S): at 10:36

## 2023-06-23 RX ADMIN — Medication 650 MILLIGRAM(S): at 00:07

## 2023-06-23 RX ADMIN — Medication 650 MILLIGRAM(S): at 05:59

## 2023-06-23 RX ADMIN — INSULIN GLARGINE 40 UNIT(S): 100 INJECTION, SOLUTION SUBCUTANEOUS at 23:49

## 2023-06-23 RX ADMIN — HYDROMORPHONE HYDROCHLORIDE 0.5 MILLIGRAM(S): 2 INJECTION INTRAMUSCULAR; INTRAVENOUS; SUBCUTANEOUS at 13:30

## 2023-06-23 RX ADMIN — HYDROMORPHONE HYDROCHLORIDE 1 MILLIGRAM(S): 2 INJECTION INTRAMUSCULAR; INTRAVENOUS; SUBCUTANEOUS at 11:15

## 2023-06-23 RX ADMIN — GABAPENTIN 300 MILLIGRAM(S): 400 CAPSULE ORAL at 15:44

## 2023-06-23 RX ADMIN — Medication 5 MILLIGRAM(S): at 15:36

## 2023-06-23 RX ADMIN — OXYCODONE HYDROCHLORIDE 5 MILLIGRAM(S): 5 TABLET ORAL at 06:03

## 2023-06-23 RX ADMIN — HEPARIN SODIUM 1800 UNIT(S)/HR: 5000 INJECTION INTRAVENOUS; SUBCUTANEOUS at 20:05

## 2023-06-23 RX ADMIN — Medication 1 TABLET(S): at 15:36

## 2023-06-23 RX ADMIN — Medication 12: at 07:00

## 2023-06-23 RX ADMIN — INSULIN GLARGINE 30 UNIT(S): 100 INJECTION, SOLUTION SUBCUTANEOUS at 11:00

## 2023-06-23 RX ADMIN — Medication 20 UNIT(S): at 22:18

## 2023-06-23 RX ADMIN — Medication 650 MILLIGRAM(S): at 12:45

## 2023-06-23 RX ADMIN — HYDROMORPHONE HYDROCHLORIDE 1 MILLIGRAM(S): 2 INJECTION INTRAMUSCULAR; INTRAVENOUS; SUBCUTANEOUS at 10:36

## 2023-06-23 RX ADMIN — Medication 8: at 16:15

## 2023-06-23 RX ADMIN — SODIUM CHLORIDE 75 MILLILITER(S): 9 INJECTION INTRAMUSCULAR; INTRAVENOUS; SUBCUTANEOUS at 06:39

## 2023-06-23 RX ADMIN — Medication 102 MILLIGRAM(S): at 17:28

## 2023-06-23 RX ADMIN — Medication 650 MILLIGRAM(S): at 01:07

## 2023-06-23 RX ADMIN — OXYCODONE HYDROCHLORIDE 5 MILLIGRAM(S): 5 TABLET ORAL at 07:00

## 2023-06-23 RX ADMIN — Medication 5 MILLIGRAM(S): at 05:59

## 2023-06-23 RX ADMIN — Medication 50 MILLIGRAM(S): at 22:18

## 2023-06-23 RX ADMIN — Medication 200 MILLIGRAM(S): at 15:32

## 2023-06-23 RX ADMIN — Medication 650 MILLIGRAM(S): at 21:06

## 2023-06-23 RX ADMIN — Medication 12: at 21:57

## 2023-06-23 RX ADMIN — Medication 100 MILLIGRAM(S): at 09:08

## 2023-06-23 RX ADMIN — Medication 650 MILLIGRAM(S): at 07:00

## 2023-06-23 RX ADMIN — GABAPENTIN 200 MILLIGRAM(S): 400 CAPSULE ORAL at 05:59

## 2023-06-23 RX ADMIN — Medication 650 MILLIGRAM(S): at 20:06

## 2023-06-23 RX ADMIN — HEPARIN SODIUM 1800 UNIT(S)/HR: 5000 INJECTION INTRAVENOUS; SUBCUTANEOUS at 07:34

## 2023-06-23 RX ADMIN — Medication 650 MILLIGRAM(S): at 13:30

## 2023-06-23 RX ADMIN — HEPARIN SODIUM 1800 UNIT(S)/HR: 5000 INJECTION INTRAVENOUS; SUBCUTANEOUS at 05:16

## 2023-06-23 RX ADMIN — HYDROMORPHONE HYDROCHLORIDE 0.5 MILLIGRAM(S): 2 INJECTION INTRAMUSCULAR; INTRAVENOUS; SUBCUTANEOUS at 12:46

## 2023-06-23 RX ADMIN — LORATADINE 10 MILLIGRAM(S): 10 TABLET ORAL at 15:37

## 2023-06-23 RX ADMIN — Medication 81 MILLIGRAM(S): at 12:45

## 2023-06-23 NOTE — PROCEDURE NOTE - NSICDXPROCEDURE_GEN_ALL_CORE_FT
PROCEDURES:  Insertion of intravenous catheter with ultrasound guidance 23-Jun-2023 12:30:36  Fredrick Villanueva  
PROCEDURES:  Insertion of intravenous catheter with ultrasound guidance 23-Jun-2023 12:30:36  Fredrick Villanueva  Blood draw, venipuncture 23-Jun-2023 12:34:52  Fredrick Villanueva  Culture blood 23-Jun-2023 12:38:16  Fredrick Villanueva  
PROCEDURES:  Insertion of intravenous catheter with ultrasound guidance 21-Jun-2023 09:31:56  Fredrick Villanueva

## 2023-06-23 NOTE — PROGRESS NOTE ADULT - SUBJECTIVE AND OBJECTIVE BOX
SUBJECTIVE / INTERVAL HPI: Patient was seen and examined this morning. Unable to obtain CCTA due to lack of IV access, which they are still trying to obtain. NPO after MN for CCTA today if able to obtain access. To be premedicated with solumedrol again for contrast allergy. Post prandial hyperglycemia after diet was started yesterday.      CAPILLARY BLOOD GLUCOSE & INSULIN RECEIVED  157 mg/dL (06-22 @ 00:48)  75 mg/dL (06-22 @ 03:08)  112 mg/dL (06-22 @ 05:30)  91 mg/dL (06-22 @ 06:41)  125 mg/dL (06-22 @ 07:26)  170 mg/dL (06-22 @ 12:28) - Lispro 10+2  392 mg/dL (06-22 @ 21:47) - Lantus 10 + lispro 10  446 mg/dL (06-23 @ 05:30)  430 mg/dL (06-23 @ 06:42) - Lantus 30 + lispro 12      REVIEW OF SYSTEMS  Constitutional:  (+) loss of appetite. Negative fever, chills.   Cardiovascular:  Negative for chest pain or palpitations.  Respiratory:  Negative for cough, wheezing, or shortness of breath.    Gastrointestinal:  (+) Nausea. Negative for vomiting, diarrhea, constipation, or abdominal pain.  Genitourinary:  (+) Suprapubic pain.  Neurologic:  No headache, confusion, dizziness, lightheadedness.    PHYSICAL EXAM  Vital Signs Last 24 Hrs  T(C): 36.1 (23 Jun 2023 09:52), Max: 36.6 (22 Jun 2023 14:06)  T(F): 97 (23 Jun 2023 09:52), Max: 97.9 (22 Jun 2023 14:06)  HR: 85 (23 Jun 2023 09:07) (85 - 90)  BP: 134/67 (23 Jun 2023 09:07) (116/60 - 134/67)  BP(mean): 80 (23 Jun 2023 05:34) (80 - 106)  RR: 18 (23 Jun 2023 09:07) (18 - 18)  SpO2: 100% (23 Jun 2023 09:07) (100% - 100%)    Parameters below as of 23 Jun 2023 09:07  Patient On (Oxygen Delivery Method): room air    Constitutional: Awake, alert, obese female, in no acute distress.   HEENT: Normocephalic, atraumatic, NAOMI.  Respiratory: Lungs clear to ausculation bilaterally.   Cardiovascular: regular rhythm, normal S1 and S2, no audible murmurs.   GI: soft, non-tender, non-distended, bowel sounds present. (+) Ostomy bag in place.   : (+) Suprapubic catheter.   Extremities: No lower extremity edema.  Psychiatric: AAO x 3. Normal affect/mood.     LABS  CBC - WBC/HGB/HTC/PLT: 6.37/10.8/35.8/340 (06-23-23)  BMP - Na/K/Cl/Bicarb/BUN/Cr/Gluc/AG/eGFR: 131/4.4/101/17/21/1.16/446/13/60 (06-23-23)  Ca - 7.3 (06-23-23)  Phos - -- (06-23-23)  Mg - 1.4 (06-23-23)  LFT - Alb/Tprot/Tbili/Dbili/AlkPhos/ALT/AST: 3.2/--/<0.2/--/124/8/14 (06-22-23)  PT/aPTT/INR: --/78.2/-- (06-23-23)   Thyroid Stimulating Hormone, Serum: 0.305 (06-21-23)      MEDICATIONS  MEDICATIONS  (STANDING):  acetaminophen     Tablet .. 650 milliGRAM(s) Oral every 6 hours  aspirin enteric coated 81 milliGRAM(s) Oral daily  atorvastatin 20 milliGRAM(s) Oral at bedtime  dextrose 5%. 1000 milliLiter(s) (100 mL/Hr) IV Continuous <Continuous>  dextrose 5%. 1000 milliLiter(s) (50 mL/Hr) IV Continuous <Continuous>  dextrose 50% Injectable 12.5 Gram(s) IV Push once  dextrose 50% Injectable 25 Gram(s) IV Push once  dextrose 50% Injectable 25 Gram(s) IV Push once  diphenhydrAMINE Injectable 50 milliGRAM(s) IV Push two times a day  gabapentin 200 milliGRAM(s) Oral three times a day  glucagon  Injectable 1 milliGRAM(s) IntraMuscular once  heparin  Infusion.  Unit(s)/Hr (18 mL/Hr) IV Continuous <Continuous>  insulin glargine Injectable (LANTUS) 10 Unit(s) SubCutaneous at bedtime  insulin glargine Injectable (LANTUS) 30 Unit(s) SubCutaneous once  insulin lispro (ADMELOG) corrective regimen sliding scale   SubCutaneous Before meals and at bedtime  insulin lispro Injectable (ADMELOG) 10 Unit(s) SubCutaneous three times a day before meals  lactobacillus acidophilus 1 Tablet(s) Oral daily  loratadine 10 milliGRAM(s) Oral daily  methylPREDNISolone sodium succinate Injectable 40 milliGRAM(s) IV Push once  metoprolol succinate ER 50 milliGRAM(s) Oral at bedtime  oxybutynin 5 milliGRAM(s) Oral three times a day  sodium chloride 0.9%. 1000 milliLiter(s) (150 mL/Hr) IV Continuous <Continuous>    MEDICATIONS  (PRN):  albuterol    90 MICROgram(s) HFA Inhaler 2 Puff(s) Inhalation every 6 hours PRN Shortness of Breath and/or Wheezing  dextrose Oral Gel 15 Gram(s) Oral once PRN Blood Glucose LESS THAN 70 milliGRAM(s)/deciliter  HYDROmorphone  Injectable 0.5 milliGRAM(s) IV Push once PRN Severe Pain (7 - 10)  oxyCODONE    IR 5 milliGRAM(s) Oral every 8 hours PRN Moderate Pain (4 - 6)            SUBJECTIVE / INTERVAL HPI: Patient was seen and examined this morning. Unable to obtain CCTA due to lack of IV access, which they are still trying to obtain. NPO after MN for CCTA today if able to obtain access. To be premedicated with HC 200mg for contrast allergy at 3PM. Post prandial hyperglycemia after diet was started yesterday.      CAPILLARY BLOOD GLUCOSE & INSULIN RECEIVED  157 mg/dL (06-22 @ 00:48)  75 mg/dL (06-22 @ 03:08)  112 mg/dL (06-22 @ 05:30)  91 mg/dL (06-22 @ 06:41)  125 mg/dL (06-22 @ 07:26)  170 mg/dL (06-22 @ 12:28) - Lispro 10+2  407 mg/dL (06-22 @ dinner) - Lispro 10+12. Ate rice, beans, and yogurt parfait.  392 mg/dL (06-22 @ 21:47) - Lantus 10 + lispro 10  446 mg/dL (06-23 @ 05:30)  430 mg/dL (06-23 @ 06:42) - Lantus 30 + lispro 12. NPO  11Am Lantus 30 units.      REVIEW OF SYSTEMS  Constitutional:  (+) loss of appetite. Negative fever, chills.   Cardiovascular:  Negative for chest pain or palpitations.  Respiratory:  Negative for cough, wheezing, or shortness of breath.    Gastrointestinal:  (+) Nausea. Negative for vomiting, diarrhea, constipation, or abdominal pain.  Genitourinary:  (+) Suprapubic pain.  Neurologic:  No headache, confusion, dizziness, lightheadedness.    PHYSICAL EXAM  Vital Signs Last 24 Hrs  T(C): 36.1 (23 Jun 2023 09:52), Max: 36.6 (22 Jun 2023 14:06)  T(F): 97 (23 Jun 2023 09:52), Max: 97.9 (22 Jun 2023 14:06)  HR: 85 (23 Jun 2023 09:07) (85 - 90)  BP: 134/67 (23 Jun 2023 09:07) (116/60 - 134/67)  BP(mean): 80 (23 Jun 2023 05:34) (80 - 106)  RR: 18 (23 Jun 2023 09:07) (18 - 18)  SpO2: 100% (23 Jun 2023 09:07) (100% - 100%)    Parameters below as of 23 Jun 2023 09:07  Patient On (Oxygen Delivery Method): room air    Constitutional: Awake, alert, obese female, in no acute distress.   HEENT: Normocephalic, atraumatic, NAOMI.  Respiratory: Lungs clear to ausculation bilaterally.   Cardiovascular: regular rhythm, normal S1 and S2, no audible murmurs.   GI: soft, non-tender, non-distended, bowel sounds present. (+) Ostomy bag in place.   : (+) Suprapubic catheter.   Extremities: No lower extremity edema.  Psychiatric: AAO x 3. Normal affect/mood.     LABS  CBC - WBC/HGB/HTC/PLT: 6.37/10.8/35.8/340 (06-23-23)  BMP - Na/K/Cl/Bicarb/BUN/Cr/Gluc/AG/eGFR: 131/4.4/101/17/21/1.16/446/13/60 (06-23-23)  Ca - 7.3 (06-23-23)  Phos - -- (06-23-23)  Mg - 1.4 (06-23-23)  LFT - Alb/Tprot/Tbili/Dbili/AlkPhos/ALT/AST: 3.2/--/<0.2/--/124/8/14 (06-22-23)  PT/aPTT/INR: --/78.2/-- (06-23-23)   Thyroid Stimulating Hormone, Serum: 0.305 (06-21-23)      MEDICATIONS  MEDICATIONS  (STANDING):  acetaminophen     Tablet .. 650 milliGRAM(s) Oral every 6 hours  aspirin enteric coated 81 milliGRAM(s) Oral daily  atorvastatin 20 milliGRAM(s) Oral at bedtime  dextrose 5%. 1000 milliLiter(s) (100 mL/Hr) IV Continuous <Continuous>  dextrose 5%. 1000 milliLiter(s) (50 mL/Hr) IV Continuous <Continuous>  dextrose 50% Injectable 12.5 Gram(s) IV Push once  dextrose 50% Injectable 25 Gram(s) IV Push once  dextrose 50% Injectable 25 Gram(s) IV Push once  diphenhydrAMINE Injectable 50 milliGRAM(s) IV Push two times a day  gabapentin 200 milliGRAM(s) Oral three times a day  glucagon  Injectable 1 milliGRAM(s) IntraMuscular once  heparin  Infusion.  Unit(s)/Hr (18 mL/Hr) IV Continuous <Continuous>  insulin glargine Injectable (LANTUS) 10 Unit(s) SubCutaneous at bedtime  insulin glargine Injectable (LANTUS) 30 Unit(s) SubCutaneous once  insulin lispro (ADMELOG) corrective regimen sliding scale   SubCutaneous Before meals and at bedtime  insulin lispro Injectable (ADMELOG) 10 Unit(s) SubCutaneous three times a day before meals  lactobacillus acidophilus 1 Tablet(s) Oral daily  loratadine 10 milliGRAM(s) Oral daily  methylPREDNISolone sodium succinate Injectable 40 milliGRAM(s) IV Push once  metoprolol succinate ER 50 milliGRAM(s) Oral at bedtime  oxybutynin 5 milliGRAM(s) Oral three times a day  sodium chloride 0.9%. 1000 milliLiter(s) (150 mL/Hr) IV Continuous <Continuous>    MEDICATIONS  (PRN):  albuterol    90 MICROgram(s) HFA Inhaler 2 Puff(s) Inhalation every 6 hours PRN Shortness of Breath and/or Wheezing  dextrose Oral Gel 15 Gram(s) Oral once PRN Blood Glucose LESS THAN 70 milliGRAM(s)/deciliter  HYDROmorphone  Injectable 0.5 milliGRAM(s) IV Push once PRN Severe Pain (7 - 10)  oxyCODONE    IR 5 milliGRAM(s) Oral every 8 hours PRN Moderate Pain (4 - 6)

## 2023-06-23 NOTE — PROGRESS NOTE ADULT - ATTENDING COMMENTS
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,  PE (on Eliquis last dose AM 6/20/23), DM2-poorly controlled on insulin therapy, HTN, HLD, hx abdominal necrotizing fasciitis (s/p suprapubic catheter, ostomy placement in 11/2021), s/p intubation, hx of frequent UTIs 2/2 to suprapubic catheter, most recent UTI c/b Urosepsis admitted [id number] 5/23-6/1 (Klebsiella and Ecoli discharged with left midline for tx w/ Ertapenem 6/9 and Gentamicin 6/5; pt reports missing two days tx of Ertapenem due to midline malfunction; removed ~1week ago by home care RN)  who presesents to [facility] ED on 6/20/23 for chest pain ( pressure like sensation, Leucytosis, severe hyperglycemia ( more than 600s ), LORA , lactic acidosis - admitted to tele for elevated troponin and chest pain concern ACS.     pt seen and examined with   she kept the light off- when asked to turn on light -she cover the eyes with towel ( stated the light can trigger migraine  appear comfortable,   supra-pubic catheter site- appear clean/leak no change from previous   afebrile        - leucocytosis / LORA/ supra-pubic pain with recent treated MDR UTI - now UA with bacteria/LE -no other source of infection seen,   leucocytosis resolved without antibiotics,   resolved with IVF received more than 4 L of IVF  ID evaluated patient 6/21-recommend to monitor off antibiotics  - we talked about removing supra-pubic catheter, she talked about hard for her to get up and get to bathroom due to her left leg weakness from prior cva   - monitor cbc/ fever curve and to start antibiotics as per ID rec.     - DM ( on home insulin with Hyperglycemia )- poorly controlled- hb A1c 11  presented with Hyperglycemia/ Hypokalemia/ AG 24 on admission, LORA- cr 1.8 - given insulin, IVF, Glucose went to >600 after single dose of solumedrol given due to allergy to iodine prior to CT scan - now gap closed, fluctuating glucose level , nausea with poor oral intake   ? hyperglycemia related to infection/sepsis vs non-compliance with meds.  hypokalemia being replaced.  abdominal pain could be related to electrolyte imbalance   - endocrine has been consulted ( pt known to endo service )       - chest pain with elevated troponin- NSTEMI,  - pt with hx of DM -work up in progress for ACS vs type 2 from Demand ischemia from metabolic derangement   - TTE -EF 70-75 %   - - plan for CTTA for cardiac work up, team coordinating with endocrine for insulin/solumedrol -  -would continue with tele monitoring for now.    -- hx of PE on eliquis.    - colostomy bag looks clean, due to her body habitus, skin fold around the supra-pubic catheter area, site tender ( no erythema noted ) would suggest IR/ EU evaluation for Catheter removal - ( Suprapubic catheter was placed for convenience as per record, now that she can ambulate few steps we encouraged her to use camote   ) prn meds for bladder spasm. ( can use oxybutynin     - neuropathic pain - reported gabapentin helps- would renally dose gabapentin -now with improvement in renal function  - to increase gabapentin to 300 mg tid today with plan to titrate up towards home dose ( reported home dose 600 mg tid -)  - add tylenol standing   - oxycodone 5 mg po q 6 hourly prn ( that will give total 20 mg over 24 hours, reported taking oxy 10 mg bid max at home is 20 mg in 24 hours )    thank you for allowing medicine to participate in the care, guy cardiology,  resident Dr. Herndon.

## 2023-06-23 NOTE — PROGRESS NOTE ADULT - PROBLEM SELECTOR PLAN 10
Persistent L>R UE and LE weakness and paresthesias  -s/p dilaudid 1mg Stat x 1, monitor closely, prolonged QTc on admit  -Gabapentin decreased to 200mg TID and Oxycodone 5mg q8h PRN

## 2023-06-23 NOTE — DIETITIAN INITIAL EVALUATION ADULT - REASON
patient without weight loss and no decline in intake patterns prior to admission and during stay. Dies not meet criteria per ASPEN guidelines.

## 2023-06-23 NOTE — PROGRESS NOTE ADULT - SUBJECTIVE AND OBJECTIVE BOX
Incomplete    S: Pt seen and examined bedside, found NAD, HDS.  Patient denies C/P, SOB,  palpitations, diaphoresis, dizziness or any other complaints at this time.  Pt denies fever/chills, dysuria, abdominal pain, diarrhea, and cough  12 Point ROS otherwise negative except as per HPI/subjective.     O: Vital Signs Last 24 Hrs  T(C): 36.5 (23 Jun 2023 13:57), Max: 36.5 (23 Jun 2023 13:57)  T(F): 97.7 (23 Jun 2023 13:57), Max: 97.7 (23 Jun 2023 13:57)  HR: 82 (23 Jun 2023 13:29) (82 - 90)  BP: 133/89 (23 Jun 2023 13:29) (116/60 - 134/67)  BP(mean): 80 (23 Jun 2023 05:34) (80 - 106)  RR: 18 (23 Jun 2023 13:29) (18 - 18)  SpO2: 100% (23 Jun 2023 13:29) (100% - 100%)    Parameters below as of 23 Jun 2023 13:29  Patient On (Oxygen Delivery Method): room air        PHYSICAL EXAM:  GEN: NAD  HEENT: No JVD  PULM:  CTA B/L  CARD:  RRR, S1 and S2   ABD: +BS, NT, soft/ND	  EXT: No Edema B/L LE  NEURO: A+Ox3, no focal deficit  PSYCH: Mood Appropriate    LABS:                        10.8   6.37  )-----------( 340      ( 23 Jun 2023 05:30 )             35.8     06-23    131<L>  |  101  |  21  ----------------------------<  446<H>  4.4   |  17<L>  |  1.16    Ca    7.3<L>      23 Jun 2023 05:30  Mg     1.4     06-23    TPro  6.7  /  Alb  3.2<L>  /  TBili  <0.2  /  DBili  x   /  AST  14  /  ALT  8<L>  /  AlkPhos  124<H>  06-22    PT/INR - ( 22 Jun 2023 05:30 )   PT: 11.6 sec;   INR: 0.98          PTT - ( 23 Jun 2023 05:30 )  PTT:78.2 sec      06-22 @ 07:01 - 06-23 @ 07:00  --------------------------------------------------------  IN: 360 mL / OUT: 1150 mL / NET: -790 mL    06-23 @ 07:01 - 06-23 @ 14:57  --------------------------------------------------------  IN: 120 mL / OUT: 950 mL / NET: -830 mL      Daily     Daily    S: Pt seen and examined bedside, found NAD, HDS.  Patient denies C/P, SOB,  palpitations, diaphoresis, dizziness or any other complaints at this time.    12 Point ROS otherwise negative except as per HPI/subjective.     O: Vital Signs Last 24 Hrs  T(C): 36.5 (23 Jun 2023 13:57), Max: 36.5 (23 Jun 2023 13:57)  T(F): 97.7 (23 Jun 2023 13:57), Max: 97.7 (23 Jun 2023 13:57)  HR: 82 (23 Jun 2023 13:29) (82 - 90)  BP: 133/89 (23 Jun 2023 13:29) (116/60 - 134/67)  BP(mean): 80 (23 Jun 2023 05:34) (80 - 106)  RR: 18 (23 Jun 2023 13:29) (18 - 18)  SpO2: 100% (23 Jun 2023 13:29) (100% - 100%)    Parameters below as of 23 Jun 2023 13:29  Patient On (Oxygen Delivery Method): room air    PHYSICAL EXAM:  GEN: NAD  HEENT: No JVD  PULM:  CTA B/L, no RRW B/L  CARD:  RRR, S1 and S2   ABD: +BS, NT, soft/ND	  EXT: No Edema B/L LE  NEURO: A+Ox3, no focal deficit  PSYCH: Mood Appropriate    LABS:                        10.8   6.37  )-----------( 340      ( 23 Jun 2023 05:30 )             35.8     06-23    131<L>  |  101  |  21  ----------------------------<  446<H>  4.4   |  17<L>  |  1.16    Ca    7.3<L>      23 Jun 2023 05:30  Mg     1.4     06-23    TPro  6.7  /  Alb  3.2<L>  /  TBili  <0.2  /  DBili  x   /  AST  14  /  ALT  8<L>  /  AlkPhos  124<H>  06-22    PT/INR - ( 22 Jun 2023 05:30 )   PT: 11.6 sec;   INR: 0.98          PTT - ( 23 Jun 2023 05:30 )  PTT:78.2 sec      06-22 @ 07:01 - 06-23 @ 07:00  --------------------------------------------------------  IN: 360 mL / OUT: 1150 mL / NET: -790 mL    06-23 @ 07:01 - 06-23 @ 14:57  --------------------------------------------------------  IN: 120 mL / OUT: 950 mL / NET: -830 mL

## 2023-06-23 NOTE — PROGRESS NOTE ADULT - PROBLEM SELECTOR PLAN 1
- Lantus 30 units this AM.  - Increase lispro to 20 units before each meal.  - Continue lispro moderate dose sliding scale before meals and at bedtime.  - Patient's fingerstick glucose goal is 100-180 mg/dL.    - For discharge, patient can U500 and admelog, dose TBD.  - Patient can follow up at discharge with [facility] by calling [phone] to make an appointment.      Case discussed with Dr. Rose. Primary team updated. - Lantus 30 units this AM. Lantus   units at bedtime.  - Increase lispro to 20 units before each meal.  - Continue lispro moderate dose sliding scale before meals and at bedtime.  - Patient's fingerstick glucose goal is 100-180 mg/dL.    - For discharge, patient can U500 and admelog, dose TBD.  - Patient can follow up at discharge with [facility] by calling [phone] to make an appointment.      Case discussed with Dr. Rose. Primary team updated. - Lantus 40  units at bedtime and Lantus 30 units this AM.   - Increase lispro to 20 units before each meal.  - Continue lispro moderate dose sliding scale before meals and at bedtime.  - Patient's fingerstick glucose goal is 100-180 mg/dL.    - For discharge, patient can U500 and admelog, dose TBD.  - Patient can follow up at discharge with [facility] by calling [phone] to make an appointment.      Case discussed with Dr. Rose. Primary team updated.

## 2023-06-23 NOTE — PROCEDURE NOTE - NSPROCDETAILS_GEN_ALL_CORE
location identified, draped/prepped, sterile technique used/blood seen on insertion/dressing applied/flushes easily/secured in place/sterile technique, catheter placed
US guidance/location identified, draped/prepped, sterile technique used/blood seen on insertion/dressing applied/flushes easily/secured in place/sterile technique, catheter placed

## 2023-06-23 NOTE — DIETITIAN INITIAL EVALUATION ADULT - PROBLEM SELECTOR PLAN 2
- DKA vs HHS vs uncontrolled hyperglycemia i/s/o SIRS  - A1c 11.9%   - Endocrine consulted and following  - hyperglycemia >550 -> initiated on Lantus 70units BID (received x1); FSBG downtrended to 300-->127-> then overcorrected to 54mg/dL. Pt was alert and felt jittery, resolved s/p PO Dextrose and juice intake; Per discussion with Endocrinology, Lantus discontinued until reassessment in AM 6/22.   - Anion gap 24 on admission, now closing at 17.  - BHB 0.5--> 0.1  - continue to monitor lytes and FSBG closely  - continue Admelog 10units TIDAC and moderate ISS as d/w Endocrine.

## 2023-06-23 NOTE — CONSULT NOTE ADULT - ASSESSMENT
Assessment:    43 year old female with PMH of CVA, neurogenic bladder on chronic suprapubic catheter (SPT), PE (on Eliquis), DM2, necrotizing fascitis (s/p SPT + colostomy), and frequent UTI's. Recently admitted for urosepsis 2/2 Klebsiella and E. coli and discharged on gentamycin and ertapenem. Patient presented with chest pain 2/2 demand ischemia. ID consulted due to recent history of urosepsis. Urine culture positive for Candida and E. faecalis. Patient without signs of UTI (afebrile, no leukocytosis, or signs of urosepsis); positive cultures are consistent with colonization and not UTI. IR consulted to remove SPT due to concern the SPT being the source for recurrent UTI's.     Recommendations:   -Patient refused removal of SPT without replacement as the patient has neurogenic bladder and is unable to ambulate either. Patient also does not want removal of catheter without sedation.  -Please discuss case with urology and ID for further management regarding Candida and E. faecalis bladder colonization and the need to remove the SPT as an inpatient without having alternate plans for SPT replacement.         Communicated with:

## 2023-06-23 NOTE — DIETITIAN INITIAL EVALUATION ADULT - PROBLEM SELECTOR PLAN 1
- SIRS criteria met with lactate, WBC, tachycardia  - afebrile, mildly tachycardic, nontoxic appearing, continue to monitor closely  - WBC 16.8K initially, now improved to 8.8 w/ only IVF- possibly leukocytosis due to dehydration   - Lactate 7 --> 3.5  -> back up to 7,  now s/p total 5L NS with current lactate 2.8- f/u repeat at 10pm   - ESR 48 and CRP 32.5  - f/u Blood cultures - NGTD x12 hours  - UA +WBCs, trace leuk esterase, no nitrite, +bacteria, moderate blood, and >1000 glucose; f/u urine culture (in lab)   - CTPE in ED negative for PE and CT Abdomen without acute findings  - s/p recent admission at [facility] for Urosepsis (Klebsiella and Ecoli discharged with left midline for tx w/ Ertapenem 6/9 and Gentamicin 6/5; pt reports missing two days tx of Ertapenem due to midline malfunction; removed ~1week ago by home care RN)  - discussed with Medicine: will dose Meropenem x1 now, dose discussed with Pharmacy (1gram)  - ID consulted: recommend watch off antibiotics   - ID recs consult  for removal of suprapubic catheter due to infectious risk

## 2023-06-23 NOTE — PROGRESS NOTE ADULT - PROBLEM SELECTOR PLAN 1
-SIRS criteria met with lactate, WBC, tachycardia, remains afebrile; now resolving    -Initial WBC 16.8K, likely iso dehydration, IVF ongoing, resolved (WBC 9.72 today)  -Lactate 7, s/p IV hydration, improved 2.3.  ESR 48 and CRP 32.5  -BCxs: NGTD x 24h   -UA:  Mildly positive: +WBCs, trace leuks, neg nitrite, +bacteria. +UCxs   -CT PE:  no PE.  CT Abdomen shows acute findings  -s/p recent admission at [facility] for Urosepsis (Klebsiella and Ecoli discharged with left midline for tx w/ Ertapenem 6/9 and Gentamicin 6/5; pt reports missing two days tx of Ertapenem due to midline malfunction; removed ~1week ago by home care RN)  -IM consulted for co-management:  s/p Meropenem x1   -ID consulted: recs monitor off antibiotics and remove suprapubic catheter due to infectious risks -SIRS criteria met with lactate, WBC, tachycardia, remains afebrile; now resolving    -Initial WBC 16.8K, likely iso dehydration, IVF ongoing, resolved (WBC 6.37 today)  -Lactate 7, s/p IV hydration, improved 2.3.    -ESR 48 and CRP 32.5  -BCxs: NGTD x 24h, F/U repeat BCx  6/23  -UA:  Mildly positive: +WBCs, trace leuks, neg nitrite, +bacteria. +UCxs   -CT PE:  no PE.  CT Abdomen shows acute findings  -s/p recent admission at [facility] for Urosepsis (Klebsiella and Ecoli discharged with left midline for tx w/ Ertapenem 6/9 and Gentamicin 6/5; pt reports missing two days tx of Ertapenem due to midline malfunction; removed ~1week ago by home care RN)  -IM consulted for co-management:  s/p Meropenem x1   -ID consulted: recs monitor off antibiotics and remove suprapubic catheter due to infectious risks

## 2023-06-23 NOTE — PROGRESS NOTE ADULT - PROBLEM SELECTOR PLAN 2
Hyperglycemia, BG >550: Uncontrolled hyperglycemia i/s/o SIRS  -A1c 11.9%   -Anion gap 24 on admission, now closing at 17.  -BHB 0.5--> 0.1  -Endocrine consulted. Initiated on Lantus 70units BID (received x1); overcorrected to 54mg/dL, s/p OJ with improvement in BG and Sxs. Lantus overnight with reassessment by Endo on 6/22  -Per Endo, can give add Lantus 20 units STAT in AM if receiving solumedrol for CCTA  -Restarted Lantus 10mg QHS  -c/w lispro 10U TIDAC, mISS qid  -Goal 100-180 mg/dL.    -Endo will follow until discharge.  see recs Hyperglycemia, BG >550: Uncontrolled hyperglycemia i/s/o SIRS  -A1c 11.9%   -Anion gap 24 on admission, now closing at 17.  -BHB 0.5--> 0.1  -Endocrine consulted.  Res appreciated s/p Lantus 30mg stat AM prior to CCTA  -Increased to Lantus 30mg AM and 40mg QHS  -Increased to lispro 20U TIDAC, mISS qid  -Goal 100-180 mg/dL.    -Endo will follow until discharge.  F/U recs
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
## 2023-06-23 NOTE — DIETITIAN INITIAL EVALUATION ADULT - PROBLEM/PLAN-7
HPI:  62 yo F pt w/PMHX CAD, CHF (EF 50% 10/2018), TIA, PVD, ESRD HD MTThSat, last HD Sat, presenting w/nausea, vomiting, diarrhea, AMS, found to be in DKA. As per pt and chart notes, pt had Chinese food with her family yesterday, and the entire family then began experiencing dull abdominal pain and diarrhea. Pt endorses dull lower abdominal pain, NBNB vomiting x1, and diarrhea x1. As per ED, pt was found minimally responsive, obtunded on couch, FS showing very high BG (no number given) so family brought her in. Denies F/C, CP/SOB. Pt is anuric. Of note pt frequently hospitalized for DKA - last hospitalization earlier this month. Has medtronic insulin pump basal rate 1.8u/hr.    In the ED, pt vitals were:  T=36.9 HR=78 JJ=199/75 F5TAE=710% RA RR=22  Labs notable for leukocytosis (neutrophil predominant) to 12, hyponatremia 132, K+ 6, creatinine 5.94, FS >600, AG 31, transaminitis (108/47/104), beta-hydroxy butyrate 3.1, phos 4.6, VBG 7.29/45/36/21. There, pt was administered 250ML bolus NS and started on insulin gtt at 5. CXR and CT A/P also ordered.      PAST MEDICAL & SURGICAL HISTORY:  CHF (congestive heart failure): EF 40-45%  Subclavian vein stenosis, left: s/p stent  DKA, type 1: 1/2015  ACS (acute coronary syndrome): 1/2015 - cath revealed 100% ostial stenosis not amenable to PCI - medical management  TIA (transient ischemic attack): x 2 - 8-9 years ago prior to ASD/VSD repair  CAD (coronary artery disease): s/p stents  Gout: past  CVA (cerebral infarction): with no residual, 8 yrs ago, prior to heart surgery - ST memory loss  Peripheral vascular disease: occluded left fem-pop bypass 5/2015  Diabetes mellitus type 1: Insulin Dependent - Medtronic  Minimed Paradigm Insulin Pump - Novolog  ESRD (end stage renal disease): dialysis  M, tue, thursday, saturday  Hyperlipidemia  Status post device closure of ASD: &quot;clamshell&quot;  History of cardiac catheterization: 1/2015 - no intervention  S/P femoral-popliteal bypass surgery: L and R in 2013 with graft; 5/2015 CFA angioplasty left and ileofemoral endarterectomywith vein patch angioplasty of left fem-pop bypass graft  Multiple vascular surgery both leg, left fempop bypass revision 11/2015  AV (arteriovenous fistula): Left AV graft; revision with stent placement 2-3 years ago  S/P cholecystectomy      Review of Systems:   limited 2 to confusion  Allergies    No Known Allergies    Intolerances        Social History:     FAMILY HISTORY:  Family history of smoking  Family history of hypertension  Family history of cancer (Sibling)      MEDICATIONS  (STANDING):  aspirin enteric coated 81 milliGRAM(s) Oral daily  atorvastatin 20 milliGRAM(s) Oral at bedtime  cinacalcet 60 milliGRAM(s) Oral daily  clopidogrel Tablet 75 milliGRAM(s) Oral daily  dextrose 5%. 1000 milliLiter(s) (50 mL/Hr) IV Continuous <Continuous>  dextrose 50% Injectable 50 milliLiter(s) IV Push every 15 minutes  dextrose 50% Injectable 25 milliLiter(s) IV Push every 15 minutes  DULoxetine 60 milliGRAM(s) Oral daily  heparin  Injectable 5000 Unit(s) SubCutaneous every 8 hours  hydrALAZINE 100 milliGRAM(s) Oral every 8 hours  insulin Infusion 5 Unit(s)/Hr (5 mL/Hr) IV Continuous <Continuous>  lactated ringers Bolus 500 milliLiter(s) IV Bolus every 6 hours  metoprolol succinate ER 50 milliGRAM(s) Oral daily  metroNIDAZOLE  IVPB 500 milliGRAM(s) IV Intermittent every 8 hours  multivitamin 1 Tablet(s) Oral daily  piperacillin/tazobactam IVPB. 3.375 Gram(s) IV Intermittent every 12 hours  sevelamer hydrochloride 800 milliGRAM(s) Oral three times a day with meals    MEDICATIONS  (PRN):  acetaminophen   Tablet .. 650 milliGRAM(s) Oral every 6 hours PRN Mild Pain (1 - 3), Moderate Pain (4 - 6)  dextrose 40% Gel 15 Gram(s) Oral once PRN Blood Glucose LESS THAN 70 milliGRAM(s)/deciliter  glucagon  Injectable 1 milliGRAM(s) IntraMuscular once PRN Glucose LESS THAN 70 milligrams/deciliter  ondansetron Injectable 8 milliGRAM(s) IV Push every 8 hours PRN Nausea and/or Vomiting        CAPILLARY BLOOD GLUCOSE      POCT Blood Glucose.: 525 mg/dL (17 Dec 2018 18:46)  POCT Blood Glucose.: 565 mg/dL (17 Dec 2018 18:26)  POCT Blood Glucose.: 531 mg/dL (17 Dec 2018 18:25)  POCT Blood Glucose.: 569 mg/dL (17 Dec 2018 17:45)  POCT Blood Glucose.: >600 mg/dL (17 Dec 2018 17:45)  POCT Blood Glucose.: >600 mg/dL (17 Dec 2018 14:45)  POCT Blood Glucose.: >600 mg/dL (17 Dec 2018 14:44)    I&O's Summary      PHYSICAL EXAM:  GENERAL: lethargic  HEAD:  Atraumatic, Normocephalic  EYES: EOMI, PERRLA, conjunctiva and sclera clear  NECK: Supple, No JVD  CHEST/LUNG: Clear to auscultation bilaterally; No wheeze  HEART: Regular rate and rhythm; No murmurs, rubs, or gallops  ABDOMEN: Soft, Nontender, Nondistended; Bowel sounds present  EXTREMITIES:  2+ Peripheral Pulses, No clubbing, cyanosis, or edema  NEUROLOGY: non-focal lethargic  SKIN: No rashes or lesions    LABS:                        10.8   12.0  )-----------( 274      ( 17 Dec 2018 15:15 )             33.5     12-17    132<L>  |  84<L>  |  49<H>  ----------------------------<  740<HH>  6.0<H>   |  17<L>  |  5.94<H>    Ca    8.4      17 Dec 2018 15:14  Phos  4.6     12-17  Mg     2.5     12-17    TPro  7.4  /  Alb  4.6  /  TBili  0.3  /  DBili  x   /  AST  108<H>  /  ALT  47<H>  /  AlkPhos  104  12-17              RADIOLOGY & ADDITIONAL TESTS:    Imaging Personally Reviewed:  < from: CT Abdomen and Pelvis No Cont (12.17.18 @ 16:49) >  IMPRESSION:   Limited evaluation secondary to lack of oral and IV contrast.    Redemonstration of intrahepatic biliary duct dilatation and CBD   dilatation, cannot exclude mass at the jett hepatis. Recommend MRI for   further evaluation.    Mild wall thickening of the distal esophagus.    < end of copied text >  < from: Xray Chest 1 View AP/PA (12.17.18 @ 16:29) >  IMPRESSION:     Clear lungs.    < end of copied text >      Consultant(s) Notes Reviewed:      Care Discussed with Consultants/Other Providers: DISPLAY PLAN FREE TEXT

## 2023-06-23 NOTE — CONSULT NOTE ADULT - REASON FOR ADMISSION
dizziness, chest pressure, and blurred vision

## 2023-06-23 NOTE — DIETITIAN INITIAL EVALUATION ADULT - ENERGY INTAKE
Adequate (%) Intake is 75% as observed reported; adequate to meet estimated needs with current plan of care as prescribed.

## 2023-06-23 NOTE — DIETITIAN INITIAL EVALUATION ADULT - PROBLEM SELECTOR PLAN 3
- repeated hypokalemia despite thorough repletion today, continue to follow BMP q2-4h until stabilizes: next at 10pm  - likely secondary to uncontrolled/labile blood glucose

## 2023-06-23 NOTE — PROGRESS NOTE ADULT - PROBLEM SELECTOR PLAN 11
-180, elevated BP likely 2/2 agitation/Anxiety this AM  -Holding home Losartan 2/2 LORA  -c/w Toprol XL 50mg QHS    DVT PPX:  Hep    F: NS IVF  E: K>4, Mg>2  N: DASH/TLC, NPO pMN    Dispo:  Pending medical progression

## 2023-06-23 NOTE — DIETITIAN INITIAL EVALUATION ADULT - NS FNS DIET ORDER
DASH/TLC consistent carbohydrate with pm snack daily regular texture; diet appropriate and well tolerated.

## 2023-06-23 NOTE — DIETITIAN INITIAL EVALUATION ADULT - NUTRITIONGOAL OUTCOME1
maintain BS within acceptable parameters, meet estimated nutrition needs with intake of current plan of care.

## 2023-06-23 NOTE — DIETITIAN INITIAL EVALUATION ADULT - PROBLEM SELECTOR PLAN 5
- SCr up to 1.86 initial ED labs, improving s/p IVF --> 1.46 --> 1.3  - c/w IVF  - avoid nephrotoxins; renally dose all meds  - s/p CT dye load in ED, c/w hydration   - Consult Renal if LORA worsens

## 2023-06-23 NOTE — DIETITIAN INITIAL EVALUATION ADULT - PROBLEM SELECTOR PLAN 4
- Substernal CP positional, pleuritic, and reproducible on exam w/ tenderness to palpation  - ACS ruled out - Trop  55->52-->32  - given Asa 325mg PO x1 in ED; c/w [id number] qd  - EKG showed sinus tachycardia at 100bpm, TW flattening in inferior and lateral walls. no acute ischemic changes; QTc 539ms.  - CTPA in ED w/o PE  (hx PE 04/23, on Eliquis at home)  - Hep gtt here initiated  - satting well on room air  - s/p urgent TTE - mild LVH and hyperdynamic LV; c/w dehydration;  no valvulopathy or pericardial effusion  - ESR and CRP elevated  - NPO-midnight for CCTA in AM to r/o ischemia. Pt will need premedication Solumedrol 40mg x1 prior to CCTA, as well as IVP 50mg Benadryl pre- and post-CCTA due to contrast allergy  (ordered as activatable)  - hold any QT prolonging agents - QTc 539ms  - monitor continuous telemetry + pulse ox; VSq4h  - dyslipidemia as below- starting Atorvastatin 20mg qhs
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
## 2023-06-23 NOTE — PROGRESS NOTE ADULT - ASSESSMENT
44yo woman w/ hx asthma, hx PE on home Eliquis, DM, HTN, HLD, hx abdominal necrotizing fasciitis s/p suprapubic catheter, ostomy placement in 11/2021 who presented to [facility] ED from home with worsening abdominal and suprapubic pain and drainage from prior suprapubic catheter insertion site, recent admission 5/23-6/1 for sepsis 2/2 UTI and catheter-site infection on gentamicin and ertapenem, who presents for acute chest pain w/ elevated troponins, medicine team consulted for management of severe sepsis and hyperglycemia.    Plan/Recommendations:  #Severe sepsis  On prior admission 5-23-6/1 pt had presented with worsening lower abd pain, drainage from suprapubic catheter site noted in setting of elevated lactate 5.3 in setting of severe sepsis 2/2 UTI from suprapubic catheter. At that time, UCx (5/22) growing carbapenem-resistant Klebsiella & E. Coli. Blood cultures (5/23) NGTD. S/p suprapubic catheter replacement by IR on 5/25 and since treated w/ course gentamicin and ertapenem via PICC on discharge. On current admission, patient presenting with persistent pain symptoms, lactate 7.3 on admission improved w/ IVF since elevated again back up to 7.4.  - agree w/ trial off antibiotics per ID recs  - f/u BCx x2 and UCx x1  - appreciate ID recs    #Poorly-controlled IDDM  A1c 11.9, noted on admission for FSGs in 500s.  - Continue lispro 10 units before each meal.  - mISS  - appreciate endocrine recs    #R/O NSTEMI  Patient presenting w/ acute substernal chest pain w/ mildly elevated high sensitivity troponins mid 50s. Otherwise no ST changes noted on ECG.  - agree with admission to cardiac telemetry for possible ischemic eval  - f/u CCTA per cardiology team    #Suprapubic/Abd pain  - agree with IR consult to evaluate removal of suprapubic catheter per patient's wishes  - agree w/ oxybutynin 5mg TID PRN for bladder spasms  - c/w prior pain regimen: Tylenol 975mg q8hrs, Ibuprofen 200mg q6hrs, Oxycodone IR PRN (moderate 5mg q6h, severe 10mg q4h), lidocaine patch for R midback paraspinal tenderness  - can increase gabapentin dose to 300mg TID    #Hx necrotizing fasciitis s/p ostomy  Hx of necrotizing fasciitis tx at [facility] in 11/2021 with hospital course c/b bowel resection and ostomy formation. On ROS, no increased output from ostomy bag and site appears clean/dry.  - Routine ostomy care  - Monitor ostomy output  - General surgery outpatient appointment for possible ostomy reversal.    #HTN  Home medications include Labetalol 100 mg BID and Losartan 50mg Q24.  - c/w home BP meds    #Hx Pulmonary embolism  History of bilateral pulmonary emboli in segmental and subsegmental branches on the R and subsegmental branches on the L, diagnosed on CT chest during ED visit on 4/7/23. Home meds: eliquis 5mg BID  - c/w eliquis 5mg BID    #Asthma  - Continue home Ventolin Q24 PRN 44yo woman w/ hx asthma, hx PE on home Eliquis, DM, HTN, HLD, hx abdominal necrotizing fasciitis s/p suprapubic catheter, ostomy placement in 11/2021 who presented to [facility] ED from home with worsening abdominal and suprapubic pain and drainage from prior suprapubic catheter insertion site, recent admission 5/23-6/1 for sepsis 2/2 UTI and catheter-site infection on gentamicin and ertapenem, who presents for acute chest pain w/ elevated troponins, medicine team consulted for management of severe sepsis and hyperglycemia.    Plan/Recommendations:  #R/O Sepsis  On prior admission 5-23-6/1 pt had presented with worsening lower abd pain, drainage from suprapubic catheter site noted in setting of elevated lactate 5.3 in setting of severe sepsis 2/2 UTI from suprapubic catheter. At that time, UCx (5/22) growing carbapenem-resistant Klebsiella & E. Coli. Blood cultures (5/23) NGTD. S/p suprapubic catheter replacement by IR on 5/25 and since treated w/ course gentamicin and ertapenem via PICC on discharge. On current admission, patient presenting with persistent pain symptoms, lactate 7.3 on admission improved w/ IVF since elevated again back up to 7.4.  - agree w/ trial off antibiotics per ID recs  - f/u BCx x2 and UCx x1  - appreciate ID recs    #Poorly-controlled IDDM  A1c 11.9, noted on admission for FSGs in 500s.  - Continue lispro 10 units before each meal.  - mISS  - appreciate endocrine recs    #Hypomagnesia  serum Mg 1.4 today 6/23  - please give IV magnesium sulfate 2g x1, replete to goal serum Mg >2.0    #R/O NSTEMI  Patient presenting w/ acute substernal chest pain w/ mildly elevated high sensitivity troponins mid 50s. Otherwise no ST changes noted on ECG.  - agree with admission to cardiac telemetry for possible ischemic eval  - f/u CCTA per cardiology team    #Suprapubic/Abd pain  - agree with IR consult to evaluate removal of suprapubic catheter per patient's wishes  - agree w/ oxybutynin 5mg TID PRN for bladder spasms  - c/w prior pain regimen: Tylenol 975mg q8hrs, Ibuprofen 200mg q6hrs, Oxycodone IR PRN (moderate 5mg q6h, severe 10mg q4h), lidocaine patch for R midback paraspinal tenderness  - can increase gabapentin dose to 300mg TID    #Hx necrotizing fasciitis s/p ostomy  Hx of necrotizing fasciitis tx at [facility] in 11/2021 with hospital course c/b bowel resection and ostomy formation. On ROS, no increased output from ostomy bag and site appears clean/dry.  - Routine ostomy care  - Monitor ostomy output  - General surgery outpatient appointment for possible ostomy reversal.    #HTN  Home medications include Labetalol 100 mg BID and Losartan 50mg Q24.  - c/w home BP meds    #Hx Pulmonary embolism  History of bilateral pulmonary emboli in segmental and subsegmental branches on the R and subsegmental branches on the L, diagnosed on CT chest during ED visit on 4/7/23. Home meds: eliquis 5mg BID  - c/w eliquis 5mg BID    #Asthma  - Continue home Ventolin Q24 PRN

## 2023-06-23 NOTE — PROGRESS NOTE ADULT - ASSESSMENT
43F w/ asthma, PE (on eliquis), HTN, DM, HLD, abdominal nec fasc (s/p suprapubic catheter and ostomy on Nov 2021), and recent admission to [facility] 5/22-6/1 for UTI, presenting with left sided chest pain for 1 day, found to have negative cardiac work-up, persistent hyperglycemia, ICU consulted for transfer to medical telemetry.    A1C: 11.9 %  BUN: 21  Creatinine: 1.16  GFR: 60  Weight: 101.2  BMI: 34.9

## 2023-06-23 NOTE — PROGRESS NOTE ADULT - SUBJECTIVE AND OBJECTIVE BOX
INTERVAL HPI/OVERNIGHT EVENTS:    Patient was seen and examined at bedside.  No fevers, chills, back pain, dysuria     CONSTITUTIONAL:  Negative fever or chills, feels well, good appetite  EYES:  Negative  blurry vision or double vision  CARDIOVASCULAR:  Negative for chest pain or palpitations  RESPIRATORY:  Negative for cough, wheezing, or SOB   GASTROINTESTINAL:  Negative for nausea, vomiting, diarrhea, constipation, or abdominal pain  GENITOURINARY:  Negative frequency, urgency or dysuria  NEUROLOGIC:  No headache, confusion, dizziness, lightheadedness      ANTIBIOTICS/RELEVANT:    MEDICATIONS  (STANDING):  acetaminophen     Tablet .. 650 milliGRAM(s) Oral every 6 hours  aspirin enteric coated 81 milliGRAM(s) Oral daily  atorvastatin 20 milliGRAM(s) Oral at bedtime  dextrose 5%. 1000 milliLiter(s) (100 mL/Hr) IV Continuous <Continuous>  dextrose 5%. 1000 milliLiter(s) (50 mL/Hr) IV Continuous <Continuous>  dextrose 50% Injectable 12.5 Gram(s) IV Push once  dextrose 50% Injectable 25 Gram(s) IV Push once  dextrose 50% Injectable 25 Gram(s) IV Push once  diphenhydrAMINE Injectable 50 milliGRAM(s) IV Push two times a day  gabapentin 300 milliGRAM(s) Oral three times a day  glucagon  Injectable 1 milliGRAM(s) IntraMuscular once  heparin  Infusion.  Unit(s)/Hr (18 mL/Hr) IV Continuous <Continuous>  hydrocortisone sodium succinate Injectable 200 milliGRAM(s) IV Push once  insulin glargine Injectable (LANTUS) 10 Unit(s) SubCutaneous at bedtime  insulin lispro (ADMELOG) corrective regimen sliding scale   SubCutaneous Before meals and at bedtime  insulin lispro Injectable (ADMELOG) 20 Unit(s) SubCutaneous three times a day before meals  lactobacillus acidophilus 1 Tablet(s) Oral daily  loratadine 10 milliGRAM(s) Oral daily  metoprolol succinate ER 50 milliGRAM(s) Oral at bedtime  oxybutynin 5 milliGRAM(s) Oral three times a day  sodium chloride 0.9%. 1000 milliLiter(s) (150 mL/Hr) IV Continuous <Continuous>    MEDICATIONS  (PRN):  albuterol    90 MICROgram(s) HFA Inhaler 2 Puff(s) Inhalation every 6 hours PRN Shortness of Breath and/or Wheezing  dextrose Oral Gel 15 Gram(s) Oral once PRN Blood Glucose LESS THAN 70 milliGRAM(s)/deciliter  oxyCODONE    IR 5 milliGRAM(s) Oral every 8 hours PRN Moderate Pain (4 - 6)        Vital Signs Last 24 Hrs  T(C): 36.5 (23 Jun 2023 13:57), Max: 36.5 (23 Jun 2023 13:57)  T(F): 97.7 (23 Jun 2023 13:57), Max: 97.7 (23 Jun 2023 13:57)  HR: 82 (23 Jun 2023 13:29) (82 - 90)  BP: 133/89 (23 Jun 2023 13:29) (116/60 - 134/67)  BP(mean): 80 (23 Jun 2023 05:34) (80 - 106)  RR: 18 (23 Jun 2023 13:29) (18 - 18)  SpO2: 100% (23 Jun 2023 13:29) (100% - 100%)    Parameters below as of 23 Jun 2023 13:29  Patient On (Oxygen Delivery Method): room air        PHYSICAL EXAM:  Constitutional: non-toxic, no distress  Eyes:NAOMI, EOMI  Ear/Nose/Throat: no oral lesion, no sinus tenderness on percussion	  Neck:  supple  Respiratory: CTA cristi  Cardiovascular: S1S2 RRR, no murmurs  Gastrointestinal:soft, (+) BS, no HSM  Extremities:no e/e/c  :  suprapubic catheter   Vascular: DP Pulse:	right normal; left normal      LABS:                        10.8   6.37  )-----------( 340      ( 23 Jun 2023 05:30 )             35.8     06-23    131<L>  |  101  |  21  ----------------------------<  446<H>  4.4   |  17<L>  |  1.16    Ca    7.3<L>      23 Jun 2023 05:30  Mg     1.4     06-23    TPro  6.7  /  Alb  3.2<L>  /  TBili  <0.2  /  DBili  x   /  AST  14  /  ALT  8<L>  /  AlkPhos  124<H>  06-22    PT/INR - ( 22 Jun 2023 05:30 )   PT: 11.6 sec;   INR: 0.98          PTT - ( 23 Jun 2023 05:30 )  PTT:78.2 sec  Urinalysis Basic - ( 23 Jun 2023 05:30 )    Color: x / Appearance: x / SG: x / pH: x  Gluc: 446 mg/dL / Ketone: x  / Bili: x / Urobili: x   Blood: x / Protein: x / Nitrite: x   Leuk Esterase: x / RBC: x / WBC x   Sq Epi: x / Non Sq Epi: x / Bacteria: x        MICROBIOLOGY:    Culture - Blood (06.21.23 @ 07:10)    Specimen Source: .Blood Blood-Peripheral   Culture Results:   No growth at 2 days.    Culture - Blood (06.21.23 @ 06:30)    Specimen Source: .Blood Blood-Peripheral   Culture Results:   No growth at 2 days.    Culture - Urine (06.21.23 @ 02:37)    Specimen Source: Catheterized None   Culture Results:   >100,000 CFU/ml Enterococcus faecalis  Susceptibility to follow.  80,000 CFU/ml Candida parapsilosis  Culture in progress          RADIOLOGY & ADDITIONAL STUDIES:

## 2023-06-23 NOTE — DIETITIAN INITIAL EVALUATION ADULT - NSFNSADHERENCEPTAFT_GEN_A_CORE
Patient reports following a carbohydrate consistent diet regularly and avoids concentrated forms of sweets. She was able to reflect all principles presented in education by JOAQUIN.

## 2023-06-23 NOTE — PROGRESS NOTE ADULT - PROBLEM SELECTOR PLAN 5
H/o CKD, sCR up to 1.86,   -Admit Cr 1.3, LORA likely prenal, c/w hydration, Cr 1.2 improving  -consider renal consult/work-up if Cr worsens  -Avoid Nephrotoxins H/o CKD, sCR up to 1.86,   -Admit Cr 1.3, LORA likely prenal, c/w hydration, Cr 1.1 improving  -consider renal consult/work-up if Cr worsens  -Avoid Nephrotoxins

## 2023-06-23 NOTE — PROGRESS NOTE ADULT - PROBLEM SELECTOR PLAN 8
Prior admission 5/22-6/1 for UTI of suprapubic catheter,  ESBL Klebsiella  -UA/UCxs as above  -See ID recs above  -CT abd findings as above  -Per ID, wants suprapubic removed as it is likely the source of infection  -Per ID, monitor off Abx.  + UCx does not necessarily imply infection. If signs of sepsis, can give trial tigecycline (given recent aminoglycoside and carbapenem exposure)--would dose 100mg IV loading dose X 1, followed by 50mg IV q12h (12h after loading dose)  - consulted, deferred case to IR as suprapubic cath was placed by them ( 2 months ago)  -IR consulted for suprapubic cath removal in AM  -Will start TOV once catheter is removed and Bladder Scan q6h  -c/w home oxybutinin Prior admission 5/22-6/1 for UTI of suprapubic catheter,  ESBL Klebsiella  -UA/UCxs as above  -See ID recs above  -CT abd findings as above  -Per ID, wants suprapubic removed as it is likely the source of infection  -Per ID, pt with UCx showing EColi and Candida,  Wants to continue monitor off Abx.  + UCx does not necessarily imply infection. If signs of sepsis, can give trial tigecycline (given recent aminoglycoside and carbapenem exposure)--would dose 100mg IV loading dose X 1, followed by 50mg IV q12h (12h after loading dose)  - consulted, deferred case to IR as suprapubic cath was placed by them ( 2 months ago)  -IR consulted. Per IR refused removal of SPT without replacement, h/o neurogenic bladder and is unable to ambulate at this time.  Further discussion needed in regards SPT removal, will need alternate plans for SPT replacement  -c/w home oxybutinin

## 2023-06-23 NOTE — DIETITIAN INITIAL EVALUATION ADULT - PROBLEM SELECTOR PLAN 8
- Prior admission 5/22-6/1 for UTI of suprapubic catheter,  ESBL Klebsiella  - UA as above. F/u UCx (in lab)  - diffusely tender abdomen worse in suprapubic area; c/w serial abdominal exams  - CT abd nonrevealing as above  - c/w home oxybutinin   - Per ID, will consult  Service in AM to discuss removal of suprapubic catheter due to risk of infection

## 2023-06-23 NOTE — CONSULT NOTE ADULT - SUBJECTIVE AND OBJECTIVE BOX
43 year old female with PMH of CVA, neurogenic bladder, PE (on Eliquis), DM2, necrotizing fascitis (s/p SPT + colostomy), and frequent UTI's. Recently admitted for urosepsis 2/2 Klebsiella and E. coli and d/c on gentamycin and ertapenem. Patient presented with chest pain 2/2 demand ischemia. Urine culture positive for Candida and E. faecalis.  Patient without signs of UTI (afebrile, no leukocytosis, or signs of urosepsis). IR consulted to remove SPT due to concern for source for recurrent UTI's.        Clinical History: PALPATATIONS    No pertinent family history in first degree relatives    FH: diabetes mellitus    FH: hypertension    Handoff    MEWS Score    Essential hypertension    Hyperlipidemia    Diabetes    Obesity    Hernia    Abdominal hernia    Pre-eclampsia    Pulmonary embolism    Type 2 diabetes mellitus    History of necrotizing fasciitis    History of creation of ostomy    Suprapubic catheter    H/O: CVA (cerebrovascular accident)    Sepsis, unspecified organism    Chest pain    Type 2 diabetes mellitus    History of creation of ostomy    Pulmonary embolism    Chest pain    Sepsis, unspecified organism    Leukocytosis    LORA (acute kidney injury)    Abdominal pain    H/O: CVA (cerebrovascular accident)    Essential hypertension    History of suprapubic catheter    Sepsis    Systemic inflammatory response syndrome (SIRS)    Hypokalemia    Hyperlipidemia    Insertion of intravenous catheter with ultrasound guidance    Insertion of intravenous catheter with ultrasound guidance    Blood draw, venipuncture    Culture blood    H/O LEEP    History of D&C    H/O:     H/O ventral hernia repair    H/O exploratory laparotomy    History of creation of ostomy    CHEST PAIN    PALPATATIONS    19    Hypokalemia    LORA (acute kidney injury)    [email or web address]        Meds:acetaminophen     Tablet .. 650 milliGRAM(s) Oral every 6 hours  albuterol    90 MICROgram(s) HFA Inhaler 2 Puff(s) Inhalation every 6 hours PRN  aspirin enteric coated 81 milliGRAM(s) Oral daily  atorvastatin 20 milliGRAM(s) Oral at bedtime  dextrose 5%. 1000 milliLiter(s) IV Continuous <Continuous>  dextrose 5%. 1000 milliLiter(s) IV Continuous <Continuous>  dextrose 50% Injectable 12.5 Gram(s) IV Push once  dextrose 50% Injectable 25 Gram(s) IV Push once  dextrose 50% Injectable 25 Gram(s) IV Push once  dextrose Oral Gel 15 Gram(s) Oral once PRN  diphenhydrAMINE IVPB 50 milliGRAM(s) IV Intermittent once  gabapentin 300 milliGRAM(s) Oral three times a day  glucagon  Injectable 1 milliGRAM(s) IntraMuscular once  heparin  Infusion.  Unit(s)/Hr IV Continuous <Continuous>  insulin glargine Injectable (LANTUS) 10 Unit(s) SubCutaneous at bedtime  insulin lispro (ADMELOG) corrective regimen sliding scale   SubCutaneous Before meals and at bedtime  insulin lispro Injectable (ADMELOG) 20 Unit(s) SubCutaneous three times a day before meals  lactobacillus acidophilus 1 Tablet(s) Oral daily  loratadine 10 milliGRAM(s) Oral daily  metoprolol succinate ER 50 milliGRAM(s) Oral at bedtime  oxybutynin 5 milliGRAM(s) Oral three times a day  oxyCODONE    IR 5 milliGRAM(s) Oral every 8 hours PRN  sodium chloride 0.9%. 1000 milliLiter(s) IV Continuous <Continuous>      Allergies:shellfish. (Hives; Anaphylaxis)  iodine containing compounds (Hives; Anaphylaxis)  fish (Hives; Urticaria)  clindamycin (Pruritus)  vancomycin (Anaphylaxis; Hives)  amoxicillin (Short breath; Rash)  penicillin (Hives)  Bactrim I.V. (Rash (Mod to Severe))        Labs:                           10.8   6.37  )-----------( 340      ( 2023 05:30 )             35.8     PT/INR - ( 2023 05:30 )   PT: 11.6 sec;   INR: 0.98          PTT - ( 2023 05:30 )  PTT:78.2 sec      131<L>  |  101  |  21  ----------------------------<  446<H>  4.4   |  17<L>  |  1.16    Ca    7.3<L>      2023 05:30  Mg     1.4         TPro  6.7  /  Alb  3.2<L>  /  TBili  <0.2  /  DBili  x   /  AST  14  /  ALT  8<L>  /  AlkPhos  124<H>            Imaging Findings:

## 2023-06-23 NOTE — DIETITIAN INITIAL EVALUATION ADULT - ORAL INTAKE PTA/DIET HISTORY
Writer met with patient at bedside; patient presented as alert and oriented, able to easily feed self and express self. Observed at lunch rounds with approximately 70% intake of lunch; she reports good intake in general since admission. Diet: DASH/ TLC consistent carbohydrates with pm snack appropriate and well tolerated. No chewing or swallowing problems observed or reported. No reports of N/V/D/C. Allergies to fish, shellfish noted and confirmed In diet record. No decline in intake prior ot admission per patient's report; weight fluctuates per her report of undetermined amount. No s/s of muscle wasting noted. As per ASPEN guidelines, patient does not qualify for diagnosis of malnutrition. Usual intake patterns: chicken, green beans, a large salad regularly, yogurt parfait for breakfast is a preference. 2 yogurt parfaits added to each breakfast in diet record to honor preferences. No other preferences expressed in interview. Writer provided education per carbohydrate sources, carbohydrate controlled meals, low sodium and low fat/healthy fat diet patterns. Handouts given to reinforce education per low sodium, low/healthy fat consistent carbohydrate diet with pm snack. Educated on My Plate for diabetes as well with handout given at patient's request.

## 2023-06-23 NOTE — PROCEDURE NOTE - NSPOSTCAREGUIDE_GEN_A_CORE
Verbal/written post procedure instructions were given to patient/caregiver/Instructed patient/caregiver regarding signs and symptoms of infection/Keep the cast/splint/dressing clean and dry
Verbal/written post procedure instructions were given to patient/caregiver/Instructed patient/caregiver regarding signs and symptoms of infection/Keep the cast/splint/dressing clean and dry/Care for catheter as per unit/ICU protocols
Verbal/written post procedure instructions were given to patient/caregiver/Instructed patient/caregiver regarding signs and symptoms of infection/Keep the cast/splint/dressing clean and dry/Care for catheter as per unit/ICU protocols

## 2023-06-23 NOTE — PROGRESS NOTE ADULT - SUBJECTIVE AND OBJECTIVE BOX
SUBJECTIVE/OVERNIGHT EVENTS: No acute overnight events. Pt seen in AM at bedside, resting comfortably in bed, and does not appear to be in any acute distress. When asked, pt denies any recent or active fever, chills, nausea, vomiting, headache, acute sob, chest pain, abdominal pain, genitourinary sx, extremity pain or swelling.    VITAL SIGNS:  Vital Signs Last 24 Hrs  T(C): 36.1 (23 Jun 2023 09:52), Max: 36.6 (22 Jun 2023 14:06)  T(F): 97 (23 Jun 2023 09:52), Max: 97.9 (22 Jun 2023 14:06)  HR: 85 (23 Jun 2023 09:07) (85 - 90)  BP: 134/67 (23 Jun 2023 09:07) (116/60 - 134/67)  BP(mean): 80 (23 Jun 2023 05:34) (80 - 106)  RR: 18 (23 Jun 2023 09:07) (18 - 18)  SpO2: 100% (23 Jun 2023 09:07) (100% - 100%)    Parameters below as of 23 Jun 2023 09:07  Patient On (Oxygen Delivery Method): room air        PHYSICAL EXAM:  General: NAD, resting in bed  HEENT: MMM  Neck: supple  Cardiovascular: +S1/S2; RRR  Respiratory: CTA B/L; no W/R/R  Gastrointestinal: soft, ND, +BS, ostomy bag in place, clean well-appearing surrounding skin, no output in bag, clean dry umbilicus, tenderness to palpation but out of proportion with exam and patient appears comfortable despite exclamation.   Back: tenderness to palpation of R paraspinal region of midback  : Cook bag with clear yellow urine  Extremities: WWP; no edema, clubbing or cyanosis; diffusely tender/sensitive to palpation  Vascular: 2+ radial, DP/PT pulses B/L  Neurological: AAOx3; no focal deficits    MEDICATIONS:  MEDICATIONS  (STANDING):  acetaminophen     Tablet .. 650 milliGRAM(s) Oral every 6 hours  aspirin enteric coated 81 milliGRAM(s) Oral daily  atorvastatin 20 milliGRAM(s) Oral at bedtime  dextrose 5%. 1000 milliLiter(s) (100 mL/Hr) IV Continuous <Continuous>  dextrose 5%. 1000 milliLiter(s) (50 mL/Hr) IV Continuous <Continuous>  dextrose 50% Injectable 12.5 Gram(s) IV Push once  dextrose 50% Injectable 25 Gram(s) IV Push once  dextrose 50% Injectable 25 Gram(s) IV Push once  diphenhydrAMINE Injectable 50 milliGRAM(s) IV Push two times a day  gabapentin 200 milliGRAM(s) Oral three times a day  glucagon  Injectable 1 milliGRAM(s) IntraMuscular once  heparin  Infusion.  Unit(s)/Hr (18 mL/Hr) IV Continuous <Continuous>  hydrocortisone sodium succinate Injectable 200 milliGRAM(s) IV Push once  insulin glargine Injectable (LANTUS) 10 Unit(s) SubCutaneous at bedtime  insulin lispro (ADMELOG) corrective regimen sliding scale   SubCutaneous Before meals and at bedtime  insulin lispro Injectable (ADMELOG) 10 Unit(s) SubCutaneous three times a day before meals  lactobacillus acidophilus 1 Tablet(s) Oral daily  loratadine 10 milliGRAM(s) Oral daily  metoprolol succinate ER 50 milliGRAM(s) Oral at bedtime  oxybutynin 5 milliGRAM(s) Oral three times a day  sodium chloride 0.9%. 1000 milliLiter(s) (150 mL/Hr) IV Continuous <Continuous>    MEDICATIONS  (PRN):  albuterol    90 MICROgram(s) HFA Inhaler 2 Puff(s) Inhalation every 6 hours PRN Shortness of Breath and/or Wheezing  dextrose Oral Gel 15 Gram(s) Oral once PRN Blood Glucose LESS THAN 70 milliGRAM(s)/deciliter  oxyCODONE    IR 5 milliGRAM(s) Oral every 8 hours PRN Moderate Pain (4 - 6)      ALLERGIES:  Allergies    shellfish. (Hives; Anaphylaxis)  iodine containing compounds (Hives; Anaphylaxis)  fish (Hives; Urticaria)  clindamycin (Pruritus)  vancomycin (Anaphylaxis; Hives)  amoxicillin (Short breath; Rash)  penicillin (Hives)    Intolerances    Bactrim I.V. (Rash (Mod to Severe))      LABS:                        10.8   6.37  )-----------( 340      ( 23 Jun 2023 05:30 )             35.8     06-23    131<L>  |  101  |  21  ----------------------------<  446<H>  4.4   |  17<L>  |  1.16    Ca    7.3<L>      23 Jun 2023 05:30  Mg     1.4     06-23    TPro  6.7  /  Alb  3.2<L>  /  TBili  <0.2  /  DBili  x   /  AST  14  /  ALT  8<L>  /  AlkPhos  124<H>  06-22    PT/INR - ( 22 Jun 2023 05:30 )   PT: 11.6 sec;   INR: 0.98          PTT - ( 23 Jun 2023 05:30 )  PTT:78.2 sec    RADIOLOGY & ADDITIONAL TESTS: Reviewed.

## 2023-06-23 NOTE — DIETITIAN INITIAL EVALUATION ADULT - PERTINENT MEDS FT
MEDICATIONS  (STANDING):  acetaminophen     Tablet .. 650 milliGRAM(s) Oral every 6 hours  aspirin enteric coated 81 milliGRAM(s) Oral daily  atorvastatin 20 milliGRAM(s) Oral at bedtime  dextrose 5%. 1000 milliLiter(s) (100 mL/Hr) IV Continuous <Continuous>  dextrose 5%. 1000 milliLiter(s) (50 mL/Hr) IV Continuous <Continuous>  dextrose 50% Injectable 12.5 Gram(s) IV Push once  dextrose 50% Injectable 25 Gram(s) IV Push once  dextrose 50% Injectable 25 Gram(s) IV Push once  diphenhydrAMINE Injectable 50 milliGRAM(s) IV Push two times a day  gabapentin 300 milliGRAM(s) Oral three times a day  glucagon  Injectable 1 milliGRAM(s) IntraMuscular once  heparin  Infusion.  Unit(s)/Hr (18 mL/Hr) IV Continuous <Continuous>  hydrocortisone sodium succinate Injectable 200 milliGRAM(s) IV Push once  insulin glargine Injectable (LANTUS) 10 Unit(s) SubCutaneous at bedtime  insulin lispro (ADMELOG) corrective regimen sliding scale   SubCutaneous Before meals and at bedtime  insulin lispro Injectable (ADMELOG) 20 Unit(s) SubCutaneous three times a day before meals  lactobacillus acidophilus 1 Tablet(s) Oral daily  loratadine 10 milliGRAM(s) Oral daily  metoprolol succinate ER 50 milliGRAM(s) Oral at bedtime  oxybutynin 5 milliGRAM(s) Oral three times a day  sodium chloride 0.9%. 1000 milliLiter(s) (150 mL/Hr) IV Continuous <Continuous>    MEDICATIONS  (PRN):  albuterol    90 MICROgram(s) HFA Inhaler 2 Puff(s) Inhalation every 6 hours PRN Shortness of Breath and/or Wheezing  dextrose Oral Gel 15 Gram(s) Oral once PRN Blood Glucose LESS THAN 70 milliGRAM(s)/deciliter  oxyCODONE    IR 5 milliGRAM(s) Oral every 8 hours PRN Moderate Pain (4 - 6)

## 2023-06-23 NOTE — PROGRESS NOTE ADULT - PROBLEM SELECTOR PLAN 3
Persistent hypokalemia, despite multiple repletion on 6/21  -K 3.4, repleted with KCL 40mEQ PO x1 AM,  will give add KCL 40 mEQ   -Magnesium 2.2  -Hypokalemia 2/2 hyperglycemia  -Monitor electrolytes closely Persistent hypokalemia, despite multiple repletion on 6/21, resolved  -K  4.4 and Magnesium 2.2  -Hypokalemia likely 2/2 hyperglycemia  -Monitor electrolytes closely

## 2023-06-23 NOTE — PROGRESS NOTE ADULT - PROBLEM SELECTOR PLAN 7
H/o:  Abdomen diffusely tender, exam unremarkable  -CT Abdomen without acute findings  -LLL Ostomy bag in place, C/D/I  -Ostomy RN consulted, f/u recs H/o:  Abdomen diffusely tender, exam unremarkable  -CT Abdomen without acute findings  -LLL Ostomy bag in place, C/D/I

## 2023-06-23 NOTE — PROGRESS NOTE ADULT - ASSESSMENT
44 y/o female with PMHx Asthma, CVA (11/2021; residual L>R weakness), PE (4/2023 on Eliquis), uncontrolled DM-2 (on insulin), HTN, HLD, hx abdominal necrotizing fasciitis (s/p suprapubic catheter, ostomy placement in 11/2021 with intubation from 11-12/2021), hx of frequent UTIs 2/2 to suprapubic catheter, most recent UTI c/b Urosepsis admitted [facility] 5/23-6/1 (Klebsiella and Ecoli, dc'd with left midline for tx w/ abx). Pt presenting to [facility] ED on 6/20/23, complaining of abdominal pain, nausea, chest pressure, SOB, blurry vision, and anorexia x3 days, found to be hyperglycemic to 600s, admitted to cardiology for R/O ACS. Endocrine consulted for hyperglycemia, Medicine and ID co-managing for management of SIRS. Ischemic eval with planned CCTA 6/23.  42 y/o female with PMHx Asthma, CVA (11/2021; residual L>R weakness), PE (4/2023 on Eliquis), uncontrolled DM-2 (on insulin), HTN, HLD, hx abdominal necrotizing fasciitis (s/p suprapubic catheter, ostomy placement in 11/2021 with intubation from 11-12/2021), hx of frequent UTIs 2/2 to suprapubic catheter, most recent UTI c/b Urosepsis admitted [facility] 5/23-6/1 (Klebsiella and Ecoli, dc'd with left midline for tx w/ abx). Pt presenting to [facility] ED on 6/20/23, complaining of abdominal pain, nausea, chest pressure, SOB, blurry vision, and anorexia x3 days, found to be hyperglycemic to 600s, admitted to cardiology for R/O ACS. Endocrine consulted for hyperglycemia, Medicine and ID co-managing for management of SIRS. Ischemic eval with planned CCTA 6/23. Likely transfer to medicine if ischemic w/u neg.

## 2023-06-23 NOTE — DIETITIAN INITIAL EVALUATION ADULT - PERTINENT LABORATORY DATA
06-23    131<L>  |  101  |  21  ----------------------------<  446<H>  4.4   |  17<L>  |  1.16    Ca    7.3<L>      23 Jun 2023 05:30  Mg     1.4     06-23    TPro  6.7  /  Alb  3.2<L>  /  TBili  <0.2  /  DBili  x   /  AST  14  /  ALT  8<L>  /  AlkPhos  124<H>  06-22  POCT Blood Glucose.: 430 mg/dL (06-23-23 @ 06:42)  A1C with Estimated Average Glucose Result: 11.9 % (06-21-23 @ 07:24)  A1C with Estimated Average Glucose Result: 12.2 % (04-28-23 @ 05:24)  A1C with Estimated Average Glucose Result: 12.5 % (03-20-23 @ 05:30)

## 2023-06-23 NOTE — PROGRESS NOTE ADULT - ASSESSMENT
IMPRESSION:   43 year old female with serial readmissions for UTI.  She was recently admitted for presumed UTI with CRE Klebsiella and E. coli, s/p course of gentamicin -6/5 and ertapenem -6/9 now presenting with chest pain, HTN; no overt urinary symptoms or changes in leakage from around SPT. Elevated WBC count on admission spontaneously improved (without antibiotics). No fevers. Elevated lactate noted (nonspecific) however would be reluctant to classify current presentation as urosepsis.     Enterococcus faecalis and Candida in the urine culture appear to have been drawn from her suprapubic catheter.  She is stable and afebrile off antibiotics.  These can be treated by removing the catheter    Recommend:  1.  Continue to observe off antibiotics  2.  Follow up blood cultures   3.  Agree with removing suprapubic catheter    ID team 2 will sign off.  Reconsult as needed

## 2023-06-23 NOTE — DIETITIAN INITIAL EVALUATION ADULT - PROBLEM SELECTOR PLAN 7
- In ED, CT Abdomen without acute findings  - Ostomy RN consulted, f/u recs  - abdomen diffusely tender however nonrigid and nonguarding - c/w serial abd exams  - consider Pain Mgmt consult

## 2023-06-24 DIAGNOSIS — Z86.59 PERSONAL HISTORY OF OTHER MENTAL AND BEHAVIORAL DISORDERS: ICD-10-CM

## 2023-06-24 DIAGNOSIS — E11.65 TYPE 2 DIABETES MELLITUS WITH HYPERGLYCEMIA: ICD-10-CM

## 2023-06-24 LAB
ANION GAP SERPL CALC-SCNC: 15 MMOL/L — SIGNIFICANT CHANGE UP (ref 5–17)
APTT BLD: 67.1 SEC — HIGH (ref 27.5–35.5)
B-OH-BUTYR SERPL-SCNC: 0.1 MMOL/L — SIGNIFICANT CHANGE UP
BUN SERPL-MCNC: 21 MG/DL — SIGNIFICANT CHANGE UP (ref 7–23)
CALCIUM SERPL-MCNC: 7.4 MG/DL — LOW (ref 8.4–10.5)
CHLORIDE SERPL-SCNC: 101 MMOL/L — SIGNIFICANT CHANGE UP (ref 96–108)
CO2 SERPL-SCNC: 19 MMOL/L — LOW (ref 22–31)
CREAT SERPL-MCNC: 1.11 MG/DL — SIGNIFICANT CHANGE UP (ref 0.5–1.3)
EGFR: 63 ML/MIN/1.73M2 — SIGNIFICANT CHANGE UP
GLUCOSE BLDC GLUCOMTR-MCNC: 248 MG/DL — HIGH (ref 70–99)
GLUCOSE BLDC GLUCOMTR-MCNC: 326 MG/DL — HIGH (ref 70–99)
GLUCOSE BLDC GLUCOMTR-MCNC: 339 MG/DL — HIGH (ref 70–99)
GLUCOSE BLDC GLUCOMTR-MCNC: 348 MG/DL — HIGH (ref 70–99)
GLUCOSE BLDC GLUCOMTR-MCNC: 392 MG/DL — HIGH (ref 70–99)
GLUCOSE BLDC GLUCOMTR-MCNC: 486 MG/DL — CRITICAL HIGH (ref 70–99)
GLUCOSE BLDC GLUCOMTR-MCNC: 487 MG/DL — CRITICAL HIGH (ref 70–99)
GLUCOSE SERPL-MCNC: 319 MG/DL — HIGH (ref 70–99)
HCT VFR BLD CALC: 33.5 % — LOW (ref 34.5–45)
HGB BLD-MCNC: 10.3 G/DL — LOW (ref 11.5–15.5)
MAGNESIUM SERPL-MCNC: 1.4 MG/DL — LOW (ref 1.6–2.6)
MCHC RBC-ENTMCNC: 24.1 PG — LOW (ref 27–34)
MCHC RBC-ENTMCNC: 30.7 GM/DL — LOW (ref 32–36)
MCV RBC AUTO: 78.3 FL — LOW (ref 80–100)
NRBC # BLD: 0 /100 WBCS — SIGNIFICANT CHANGE UP (ref 0–0)
PLATELET # BLD AUTO: 344 K/UL — SIGNIFICANT CHANGE UP (ref 150–400)
POTASSIUM SERPL-MCNC: 3.5 MMOL/L — SIGNIFICANT CHANGE UP (ref 3.5–5.3)
POTASSIUM SERPL-SCNC: 3.5 MMOL/L — SIGNIFICANT CHANGE UP (ref 3.5–5.3)
RBC # BLD: 4.28 M/UL — SIGNIFICANT CHANGE UP (ref 3.8–5.2)
RBC # FLD: 15.2 % — HIGH (ref 10.3–14.5)
SODIUM SERPL-SCNC: 135 MMOL/L — SIGNIFICANT CHANGE UP (ref 135–145)
WBC # BLD: 9.66 K/UL — SIGNIFICANT CHANGE UP (ref 3.8–10.5)
WBC # FLD AUTO: 9.66 K/UL — SIGNIFICANT CHANGE UP (ref 3.8–10.5)

## 2023-06-24 PROCEDURE — 99233 SBSQ HOSP IP/OBS HIGH 50: CPT

## 2023-06-24 PROCEDURE — 99232 SBSQ HOSP IP/OBS MODERATE 35: CPT

## 2023-06-24 RX ORDER — ACETAMINOPHEN 500 MG
650 TABLET ORAL EVERY 8 HOURS
Refills: 0 | Status: DISCONTINUED | OUTPATIENT
Start: 2023-06-24 | End: 2023-06-29

## 2023-06-24 RX ORDER — INSULIN LISPRO 100/ML
30 VIAL (ML) SUBCUTANEOUS
Refills: 0 | Status: DISCONTINUED | OUTPATIENT
Start: 2023-06-24 | End: 2023-06-28

## 2023-06-24 RX ORDER — GABAPENTIN 400 MG/1
400 CAPSULE ORAL THREE TIMES A DAY
Refills: 0 | Status: DISCONTINUED | OUTPATIENT
Start: 2023-06-24 | End: 2023-06-25

## 2023-06-24 RX ORDER — HYDROMORPHONE HYDROCHLORIDE 2 MG/ML
0.5 INJECTION INTRAMUSCULAR; INTRAVENOUS; SUBCUTANEOUS ONCE
Refills: 0 | Status: DISCONTINUED | OUTPATIENT
Start: 2023-06-24 | End: 2023-06-24

## 2023-06-24 RX ORDER — POTASSIUM CHLORIDE 20 MEQ
40 PACKET (EA) ORAL EVERY 4 HOURS
Refills: 0 | Status: COMPLETED | OUTPATIENT
Start: 2023-06-24 | End: 2023-06-24

## 2023-06-24 RX ORDER — HYDROMORPHONE HYDROCHLORIDE 2 MG/ML
0.5 INJECTION INTRAMUSCULAR; INTRAVENOUS; SUBCUTANEOUS ONCE
Refills: 0 | Status: DISCONTINUED | OUTPATIENT
Start: 2023-06-24 | End: 2023-06-25

## 2023-06-24 RX ORDER — INSULIN LISPRO 100/ML
12 VIAL (ML) SUBCUTANEOUS ONCE
Refills: 0 | Status: COMPLETED | OUTPATIENT
Start: 2023-06-24 | End: 2023-06-24

## 2023-06-24 RX ORDER — METOPROLOL TARTRATE 50 MG
50 TABLET ORAL DAILY
Refills: 0 | Status: DISCONTINUED | OUTPATIENT
Start: 2023-06-24 | End: 2023-06-29

## 2023-06-24 RX ORDER — HYDROMORPHONE HYDROCHLORIDE 2 MG/ML
1 INJECTION INTRAMUSCULAR; INTRAVENOUS; SUBCUTANEOUS ONCE
Refills: 0 | Status: DISCONTINUED | OUTPATIENT
Start: 2023-06-24 | End: 2023-06-24

## 2023-06-24 RX ORDER — MAGNESIUM SULFATE 500 MG/ML
2 VIAL (ML) INJECTION ONCE
Refills: 0 | Status: COMPLETED | OUTPATIENT
Start: 2023-06-24 | End: 2023-06-24

## 2023-06-24 RX ORDER — OXYCODONE HYDROCHLORIDE 5 MG/1
5 TABLET ORAL EVERY 6 HOURS
Refills: 0 | Status: DISCONTINUED | OUTPATIENT
Start: 2023-06-24 | End: 2023-06-25

## 2023-06-24 RX ORDER — CLOPIDOGREL BISULFATE 75 MG/1
600 TABLET, FILM COATED ORAL ONCE
Refills: 0 | Status: COMPLETED | OUTPATIENT
Start: 2023-06-26 | End: 2023-06-26

## 2023-06-24 RX ORDER — INSULIN GLARGINE 100 [IU]/ML
40 INJECTION, SOLUTION SUBCUTANEOUS
Refills: 0 | Status: DISCONTINUED | OUTPATIENT
Start: 2023-06-24 | End: 2023-06-26

## 2023-06-24 RX ADMIN — Medication 40 MILLIEQUIVALENT(S): at 13:47

## 2023-06-24 RX ADMIN — Medication 5 MILLIGRAM(S): at 21:58

## 2023-06-24 RX ADMIN — HEPARIN SODIUM 1800 UNIT(S)/HR: 5000 INJECTION INTRAVENOUS; SUBCUTANEOUS at 20:00

## 2023-06-24 RX ADMIN — OXYCODONE HYDROCHLORIDE 5 MILLIGRAM(S): 5 TABLET ORAL at 07:29

## 2023-06-24 RX ADMIN — GABAPENTIN 400 MILLIGRAM(S): 400 CAPSULE ORAL at 13:48

## 2023-06-24 RX ADMIN — Medication 650 MILLIGRAM(S): at 06:29

## 2023-06-24 RX ADMIN — INSULIN GLARGINE 30 UNIT(S): 100 INJECTION, SOLUTION SUBCUTANEOUS at 07:05

## 2023-06-24 RX ADMIN — HYDROMORPHONE HYDROCHLORIDE 0.5 MILLIGRAM(S): 2 INJECTION INTRAMUSCULAR; INTRAVENOUS; SUBCUTANEOUS at 13:46

## 2023-06-24 RX ADMIN — GABAPENTIN 300 MILLIGRAM(S): 400 CAPSULE ORAL at 00:08

## 2023-06-24 RX ADMIN — Medication 8: at 23:01

## 2023-06-24 RX ADMIN — Medication 5 MILLIGRAM(S): at 13:47

## 2023-06-24 RX ADMIN — Medication 50 MILLIGRAM(S): at 17:07

## 2023-06-24 RX ADMIN — HYDROMORPHONE HYDROCHLORIDE 0.5 MILLIGRAM(S): 2 INJECTION INTRAMUSCULAR; INTRAVENOUS; SUBCUTANEOUS at 00:23

## 2023-06-24 RX ADMIN — Medication 40 MILLIEQUIVALENT(S): at 09:38

## 2023-06-24 RX ADMIN — Medication 81 MILLIGRAM(S): at 12:06

## 2023-06-24 RX ADMIN — Medication 650 MILLIGRAM(S): at 07:29

## 2023-06-24 RX ADMIN — GABAPENTIN 300 MILLIGRAM(S): 400 CAPSULE ORAL at 06:28

## 2023-06-24 RX ADMIN — HYDROMORPHONE HYDROCHLORIDE 0.5 MILLIGRAM(S): 2 INJECTION INTRAMUSCULAR; INTRAVENOUS; SUBCUTANEOUS at 02:52

## 2023-06-24 RX ADMIN — HYDROMORPHONE HYDROCHLORIDE 1 MILLIGRAM(S): 2 INJECTION INTRAMUSCULAR; INTRAVENOUS; SUBCUTANEOUS at 21:59

## 2023-06-24 RX ADMIN — Medication 650 MILLIGRAM(S): at 12:06

## 2023-06-24 RX ADMIN — HEPARIN SODIUM 1800 UNIT(S)/HR: 5000 INJECTION INTRAVENOUS; SUBCUTANEOUS at 08:23

## 2023-06-24 RX ADMIN — INSULIN GLARGINE 40 UNIT(S): 100 INJECTION, SOLUTION SUBCUTANEOUS at 23:04

## 2023-06-24 RX ADMIN — HYDROMORPHONE HYDROCHLORIDE 1 MILLIGRAM(S): 2 INJECTION INTRAMUSCULAR; INTRAVENOUS; SUBCUTANEOUS at 22:59

## 2023-06-24 RX ADMIN — HYDROMORPHONE HYDROCHLORIDE 0.5 MILLIGRAM(S): 2 INJECTION INTRAMUSCULAR; INTRAVENOUS; SUBCUTANEOUS at 02:37

## 2023-06-24 RX ADMIN — Medication 12 UNIT(S): at 01:33

## 2023-06-24 RX ADMIN — Medication 20 UNIT(S): at 17:08

## 2023-06-24 RX ADMIN — Medication 650 MILLIGRAM(S): at 13:00

## 2023-06-24 RX ADMIN — ATORVASTATIN CALCIUM 20 MILLIGRAM(S): 80 TABLET, FILM COATED ORAL at 21:58

## 2023-06-24 RX ADMIN — Medication 5 MILLIGRAM(S): at 00:08

## 2023-06-24 RX ADMIN — Medication 50 MILLIGRAM(S): at 00:08

## 2023-06-24 RX ADMIN — Medication 1 TABLET(S): at 12:06

## 2023-06-24 RX ADMIN — HEPARIN SODIUM 1800 UNIT(S)/HR: 5000 INJECTION INTRAVENOUS; SUBCUTANEOUS at 08:10

## 2023-06-24 RX ADMIN — OXYCODONE HYDROCHLORIDE 5 MILLIGRAM(S): 5 TABLET ORAL at 19:31

## 2023-06-24 RX ADMIN — Medication 20 UNIT(S): at 12:06

## 2023-06-24 RX ADMIN — Medication 20 UNIT(S): at 07:04

## 2023-06-24 RX ADMIN — Medication 5 MILLIGRAM(S): at 06:28

## 2023-06-24 RX ADMIN — Medication 25 GRAM(S): at 09:38

## 2023-06-24 RX ADMIN — OXYCODONE HYDROCHLORIDE 5 MILLIGRAM(S): 5 TABLET ORAL at 06:28

## 2023-06-24 RX ADMIN — GABAPENTIN 400 MILLIGRAM(S): 400 CAPSULE ORAL at 21:58

## 2023-06-24 RX ADMIN — HYDROMORPHONE HYDROCHLORIDE 0.5 MILLIGRAM(S): 2 INJECTION INTRAMUSCULAR; INTRAVENOUS; SUBCUTANEOUS at 00:08

## 2023-06-24 RX ADMIN — ATORVASTATIN CALCIUM 20 MILLIGRAM(S): 80 TABLET, FILM COATED ORAL at 00:08

## 2023-06-24 RX ADMIN — OXYCODONE HYDROCHLORIDE 5 MILLIGRAM(S): 5 TABLET ORAL at 19:01

## 2023-06-24 RX ADMIN — HYDROMORPHONE HYDROCHLORIDE 0.5 MILLIGRAM(S): 2 INJECTION INTRAMUSCULAR; INTRAVENOUS; SUBCUTANEOUS at 14:10

## 2023-06-24 RX ADMIN — Medication 4: at 12:05

## 2023-06-24 RX ADMIN — Medication 8: at 06:29

## 2023-06-24 RX ADMIN — Medication 10: at 17:07

## 2023-06-24 NOTE — PROGRESS NOTE ADULT - ATTENDING COMMENTS
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,  PE (on Eliquis last dose AM 6/20/23), DM2-poorly controlled on insulin therapy, HTN, HLD, hx abdominal necrotizing fasciitis (s/p suprapubic catheter, ostomy placement in 11/2021), s/p intubation, hx of frequent UTIs 2/2 to suprapubic catheter, most recent UTI c/b Urosepsis admitted [facility] 5/23-6/1 (Klebsiella and Ecoli discharged with left midline for tx w/ Ertapenem 6/9 and Gentamicin 6/5; pt reports missing two days tx of Ertapenem due to midline malfunction; removed ~1week ago by home care RN)  who presesents to [facility] ED on 6/20/23 for chest pain ( pressure like sensation, Leucytosis, severe hyperglycemia ( more than 600s ), LORA , lactic acidosis - admitted to tele for elevated troponin and chest pain concern ACS.     pt seen and examined with   noted glucose high after steroids ( per RN she declined insulin yesterday )  clinically appear comfortable  for pain reported pain is mostly in the back/shooting to leg, asking for higher dose of dilaudia and oxycodone  - she is currently on gabapentin 300 tid- felt is helping- ( which is half her home dose reduced due to renal function- to increase to 400 mg po tid today, and plan to titrate up        - SIRS from non-infectious etiology ( leucocytosis / LORA/ lactic acidosis -electrolyte derangements -uncontrolled DM with hyperglycemia and mild DKA on admission -now much improved - though sugar still fluctuating -she required Steroids for iodine allergy in prep for CT scan  supra-pubic pain with recent treated MDR UTI - now UA with bacteria/LE -no other source of infection seen,   leucocytosis resolved without antibiotics,   lactic acidosis resolved with IVF/insulin- received more than 4 L of IVF  ID evaluated patient 6/21-recommend to monitor off antibiotics  - we talked about removing supra-pubic catheter, she talked about hard for her to get up and get to bathroom due to her left leg weakness from prior cva and declined to have that removed, we continue to encourage her to consider to have supra-pubic catheter removed ( have catheter for convenience )  - monitor cbc/ fever curve and to start antibiotics as per ID rec.     - DM ( on home insulin with Hyperglycemia )- poorly controlled- hb A1c 11 -non compliance with insulin   presented with Hyperglycemia/ Hypokalemia/ AG 24 on admission, LORA- cr 1.8 - given insulin, IVF, Glucose went to >600 after single dose of solumedrol given due to allergy to iodine prior to CT scan - now gap closed, fluctuating glucose level , nausea with poor oral intake   ? hyperglycemia related to infection/sepsis vs non-compliance with meds.  hypokalemia/ HypoMg - to replace goal for K around 4 to 4.5, Mg around 2.   - endocrine has been consulted ( pt known to endo service )     - chest pain with elevated troponin- NSTEMI,  - pt with hx of DM -work up in progress for ACS vs type 2 from [place] ischemia from metabolic derangement   - TTE -EF 70-75 %   -CTTA " The calcium score is  129 Agatston units (accuracy of score is   limited by image noise),  Probable severe proximal RCA stenosis,  Moderate D2 stenosis (small vessel),  Non-obstructive disease in the remaining coronary segments  dw cardiology -plan for [facility] monday.    -- hx of PE on eliquis.    - colostomy bag looks clean, due to her body habitus, skin fold around the supra-pubic catheter area, site tender ( no erythema noted ) would suggest IR/ EU evaluation for Catheter removal - ( Suprapubic catheter was placed for convenience as per record, now that she can ambulate few steps we encouraged her to use camote   ) prn meds for bladder spasm. ( can use oxybutynin     - LORA on admission due to dehydration/Electrolyte derangement from Hyperglycemia- now improved- Cr is 1.1 now  - neuropathic pain - reported gabapentin helps- would renally dose gabapentin -now with improvement in renal function to increase gabapentin to 400 mg tid today- 500 tid tomorrow and 600 tid on Monday- ( reported home dose 600 mg tid -)   - add tylenol 650 mg po q 8 hourly standing for Chronic pain  - reported Chronic pain -more than one year now, worse for couple of months now- we had lengthy discussion that opoids is not the good choice for chronic pain, currently getting oxycodone 5 mg po prn and asking for IV dilaudid higher dose   - would keep oxycodone 5 mg po q 6 hourly prn ( that will give total 20 mg over 24 hours, reported taking oxy 10 mg bid max at home is 20 mg in 24 hours )  - try to minimize iv dilaudid prn     thank you for allowing medicine to participate in the care, dw cardiology PA, RN and  resident Dr. Herndon.

## 2023-06-24 NOTE — CHART NOTE - NSCHARTNOTEFT_GEN_A_CORE
Received a call from RN ~21:50 overnight with a Fingerstick [phone]. Patients glucose likely elevated due to not receiving premeal Lispro prior to dinner this evening and in setting of receiving IV steroids prior to CCTA. Pt given premeal Lispro 20u and evening dose of Lantus 40u subcut qhs. Patient's FS's improved to 320s. Continue to monitor closely and F/U further recs from Endocrine. Received a call from RN ~21:50 overnight with a Fingerstick 544. Patients glucose likely elevated due to not receiving premeal Lispro prior to dinner this evening and in setting of receiving IV steroids prior to CCTA. Pt given premeal Lispro 20u and evening dose of Lantus 40u subcut qhs. Patient's FS's improved to 320s. Continue to monitor closely,  F/U Beta Hydroxy-Butyrate and further recs from Endocrine this AM.

## 2023-06-24 NOTE — PROGRESS NOTE ADULT - PROBLEM SELECTOR PLAN 1
-SIRS criteria met with lactate, WBC, tachycardia, remains afebrile; now resolving    -Initial WBC 16.8K, likely iso dehydration, IVF ongoing, resolved (WBC 6.37 today)  -Lactate 7, s/p IV hydration, improved 2.3.    -ESR 48 and CRP 32.5  -BCxs: NGTD x 24h, F/U repeat BCx  6/23  -UA:  Mildly positive: +WBCs, trace leuks, neg nitrite, +bacteria. +UCxs   -CT PE:  no PE.  CT Abdomen shows acute findings  -s/p recent admission at [facility] for Urosepsis (Klebsiella and Ecoli discharged with left midline for tx w/ Ertapenem 6/9 and Gentamicin 6/5; pt reports missing two days tx of Ertapenem due to midline malfunction; removed ~1week ago by home care RN)  -IM consulted for co-management:  s/p Meropenem x1   -ID consulted: recs monitor off antibiotics and remove suprapubic catheter due to infectious risks SIRS criteria met with lactate, WBC, tachycardia, remains afebrile and non toxic appearing   -Initial WBC 16.8K, likely iso dehydration, IVF ongoing, resolved (WBC 6.37 today)  -Lactate 7, s/p total of 5L NS bolus w/ improvement to 2.3.   -ESR 48 and CRP 32.5  -BCxs: NGTD x 24h, F/U repeat BCx  6/23  -UA:  Mildly positive: +WBCs, trace leuks, neg nitrite, +bacteria. +UCxs   -CT PE:  no PE.  CT Abdomen shows acute findings  -s/p recent admission at [facility] for Urosepsis (Klebsiella and Ecoli discharged with left midline for tx w/ Ertapenem 6/9 and Gentamicin 6/5; pt reports missing two days tx of Ertapenem due to midline malfunction; removed ~1week ago by home care RN)  -IM consulted for co-management:  s/p Meropenem x1   -ID consulted: recs monitor off antibiotics and remove suprapubic catheter due to infectious risks presents w/ substernal chest tightness at rest, currently CP free and HD stable  -HsTrop T 55->52-->32  -EKG: NSR, non ischemic  -TTE 6/21: hyperdynamic LVEF, mild LVH, no valvular disease  -CCTA 6/23: Ca score 129 (accuracy of score is limited by image noise), probable severe pRCA stenosis, mod D2 stenosis, non obstructive in remaining coronaries  -Cardiac cath Monday 6/26, will require pre-medication for contrast allergy w/ Solu-Cortef 200mg IVP and Benadryl 50mg IVP. s/p ASA 325mg load, will require Plavix 600mg x 1 load prior to cath monday AM, pre cath IVF hydration NS 250cc bolus in AM prior to cath  -Continue ASA 81mg QD, Lipitor 40mg HS and Toprol 50mg QD

## 2023-06-24 NOTE — PROGRESS NOTE ADULT - PROBLEM SELECTOR PLAN 7
H/o:  Abdomen diffusely tender, exam unremarkable  -CT Abdomen without acute findings  -LLL Ostomy bag in place, C/D/I   -Started Atorvastatin 20mg HS

## 2023-06-24 NOTE — PROGRESS NOTE ADULT - PROBLEM SELECTOR PLAN 5
H/o CKD, sCR up to 1.86,   -Admit Cr 1.3, LORA likely prenal, c/w hydration, Cr 1.1 improving  -consider renal consult/work-up if Cr worsens  -Avoid Nephrotoxins RESOLVED; presented w/ Cr 1.86, likely i/s/o dehydration  -s/p a total of 5L NS bolus w/ improvement  -Ulytes c/w pre-renal  -Continue to monitor

## 2023-06-24 NOTE — PROGRESS NOTE ADULT - ASSESSMENT
43F w/ contrast allergy and PMHx of HTN, HLD, poorly controlled DM-II, CVA (11/2021, residual LE weakness L>R), PE 4/2023 (on Eliquis), Asthma, abd necrotizing fasciitis s/p suprapubic catheter and ostomy in place (11/2021, requiring intubation at that time), and frequent UTIs 2/2 suprapubic catheter w/ recent hospitalization for Urosepsis 5/23-6/1 (Klebsiella and EColi, was discharged w/ L Midline for IV abx), returns to [facility] ED 6/20 c/o abd pain, nausea, CP and SOB, found to be hyperglycemic w/ -600s, hypokalemic and elevated troponin, pt now admitted to cardiac tele for r/o ACS. Pt found to have abnormal CCTA, pending cardiac cath 6/26. Medicine and ID consulted for SIRS, currently off abx and stable. Endo following for poorly controlled DM. Psych consulted for depressed mood. 43F w/ contrast allergy and PMHx of HTN, HLD, poorly controlled DM-II, CVA (11/2021, residual LE weakness L>R), PE 4/2023 (on Eliquis), Asthma, abd necrotizing fasciitis s/p suprapubic catheter and ostomy in place (11/2021, requiring intubation at that time), and frequent UTIs 2/2 suprapubic catheter w/ recent hospitalization for Urosepsis 5/23-6/1 (Klebsiella and EColi, was discharged w/ L Midline for IV abx), returns to [facility] ED 6/20 c/o abd pain, nausea, CP and SOB, found to be hyperglycemic w/ -600s, hypokalemic and elevated troponin, pt now admitted to cardiac tele for r/o ACS. Pt found to have abnormal CCTA, pending cardiac cath 6/26. Medicine and ID consulted for SIRS, currently off abx and stable. Endo following for poorly controlled DM. Psych consulted for depressed mood. 43F w/ contrast allergy and PMHx of HTN, HLD, poorly controlled DM-II, CVA (11/2021, residual LE weakness L>R), PE 4/2023 (on Eliquis), Asthma, abd necrotizing fasciitis s/p suprapubic catheter and ostomy in place (11/2021, requiring intubation at that time), and frequent UTIs 2/2 suprapubic catheter w/ recent hospitalization for Urosepsis 5/23-6/1 (Klebsiella and EColi, was discharged w/ L Midline for IV abx), returns to [facility] ED 6/20 c/o abd pain, nausea, CP and SOB, found to be hyperglycemic w/ -600s, hypokalemic and elevated troponin, pt now admitted to cardiac tele for r/o ACS. Pt found to have abnormal CCTA, pending cardiac cath 6/26. Medicine and ID consulted for SIRS, currently off abx and stable. Endo following for poorly controlled DM.

## 2023-06-24 NOTE — PROGRESS NOTE ADULT - PROBLEM SELECTOR PLAN 2
Hyperglycemia, BG >550: Uncontrolled hyperglycemia i/s/o SIRS  -A1c 11.9%   -Anion gap 24 on admission, now closing at 17.  -BHB 0.5--> 0.1  -Endocrine consulted.  Res appreciated s/p Lantus 30mg stat AM prior to CCTA  -Increased to Lantus 30mg AM and 40mg QHS  -Increased to lispro 20U TIDAC, mISS qid  -Goal 100-180 mg/dL.    -Endo will follow until discharge.  F/U recs presents w/ Hyperglycemia, BG >550 likely i/s/o SIRS; Endocrinology following  -A1c 11.9%   -Anion gap 24 on admission, now closing at 17.  -BHB 0.5--> 0.1  -BS noted to increase to 500s on 6/23 after administration of steroids prior to CCTA for contrast allergy  -Continue Lantus 30u AM and 40u HS  -Continue Lispro 20u TID  -Continue  QID presents w/ Hyperglycemia, BG >550 and A1c 11.9%, Endocrinology following  -Anion gap 24 on admission, now closing at 17.  -BHB 0.5--> 0.1  -BS noted to increase again to 500s on 6/23 after administration of steroids prior to CCTA for contrast allergy  -Continue Lantus 30u AM and 40u HS  -Continue Lispro 20u TID  -Continue  QID

## 2023-06-24 NOTE — PROGRESS NOTE ADULT - PROBLEM SELECTOR PLAN 11
-180, elevated BP likely 2/2 agitation/Anxiety this AM  -Holding home Losartan 2/2 LORA  -c/w Toprol XL 50mg QHS    DVT PPX:  Hep    F: NS IVF  E: K>4, Mg>2  N: DASH/TLC, NPO pMN    Dispo:  Pending medical progression -180, elevated BP likely 2/2 agitation/Anxiety this AM  -Holding home Losartan 2/2 LORA  -c/w Toprol XL 50mg QHS pt in depressed mood on exam today, denies SI/HI  -Psych consult, f/u recs    F: None  E: Replete if K<4 or Mag<2  N: DASH Diet  VTEppx: Heparin gtt  Dispo: Cardiac tele

## 2023-06-24 NOTE — PROGRESS NOTE ADULT - PROBLEM SELECTOR PLAN 9
CTPA in ED w/o PE  (hx PE 04/23, on Eliquis at home), on Hep Prior admission 5/22-6/1 for UTI of suprapubic catheter,  ESBL Klebsiella  -UA/UCxs as above  -See ID recs above  -CT abd findings as above  -Per ID, wants suprapubic removed as it is likely the source of infection  -Per ID, pt with UCx showing EColi and Candida,  Wants to continue monitor off Abx.  + UCx does not necessarily imply infection. If signs of sepsis, can give trial tigecycline (given recent aminoglycoside and carbapenem exposure)--would dose 100mg IV loading dose X 1, followed by 50mg IV q12h (12h after loading dose)  -IR consulted. Per IR refused removal of SPT without replacement, h/o neurogenic bladder and is unable to ambulate at this time.  Further discussion needed in regards SPT removal, will need alternate plans for SPT replacement  -c/w home oxybutinin

## 2023-06-24 NOTE — PROGRESS NOTE ADULT - SUBJECTIVE AND OBJECTIVE BOX
Cardiology PA Adult Progress Note    SUBJECTIVE ASSESSMENT:  	  MEDICATIONS:  metoprolol succinate ER 50 milliGRAM(s) Oral at bedtime  albuterol    90 MICROgram(s) HFA Inhaler 2 Puff(s) Inhalation every 6 hours PRN  loratadine 10 milliGRAM(s) Oral daily  acetaminophen     Tablet .. 650 milliGRAM(s) Oral every 6 hours  gabapentin 300 milliGRAM(s) Oral three times a day  oxyCODONE    IR 5 milliGRAM(s) Oral every 8 hours PRN  atorvastatin 20 milliGRAM(s) Oral at bedtime  insulin glargine Injectable (LANTUS) 30 Unit(s) SubCutaneous every morning  insulin glargine Injectable (LANTUS) 40 Unit(s) SubCutaneous at bedtime  insulin lispro (ADMELOG) corrective regimen sliding scale   SubCutaneous Before meals and at bedtime  insulin lispro Injectable (ADMELOG) 20 Unit(s) SubCutaneous three times a day before meals  aspirin enteric coated 81 milliGRAM(s) Oral daily  heparin  Infusion.  Unit(s)/Hr IV Continuous <Continuous>  magnesium sulfate  IVPB 2 Gram(s) IV Intermittent once  oxybutynin 5 milliGRAM(s) Oral three times a day  potassium chloride    Tablet ER 40 milliEquivalent(s) Oral every 4 hours  sodium chloride 0.9%. 1000 milliLiter(s) IV Continuous <Continuous>  	  VITAL SIGNS:  T(C): 36.1 (06-24-23 @ 05:46), Max: 36.5 (06-23-23 @ 13:57)  HR: 86 (06-24-23 @ 05:38) (74 - 94)  BP: 126/58 (06-24-23 @ 05:38) (115/72 - 150/61)  RR: 18 (06-24-23 @ 05:38) (18 - 18)  SpO2: 99% (06-24-23 @ 05:38) (99% - 100%)    I&O's Summary  23 Jun 2023 07:01  -  24 Jun 2023 07:00  --------------------------------------------------------  IN: 300 mL / OUT: 3550 mL / NET: -3250 mL                                     PHYSICAL EXAM:  Appearance: Normal	  HEENT: Normal oral mucosa, PERRL, EOMI	  Neck: Supple, + JVD/ - JVD; ___ Carotid Bruit   Cardiovascular: Normal S1 S2, No murmurs  Respiratory: Lungs clear to auscultation/Decreased Breath Sounds/No Rales, Rhonchi, Wheezing	  Gastrointestinal:  Soft, Non-tender, + BS	  Skin: No rashes, No ecchymoses, No cyanosis  Extremities: Normal range of motion, No clubbing, cyanosis or edema  Vascular: Peripheral pulses palpable 2+ bilaterally  Neurologic: Non-focal  Psychiatry: A & O x 3, Mood & affect appropriate    LABS:	 	                      10.3   9.66  )-----------( 344      ( 24 Jun 2023 07:34 )             33.5     06-24    135  |  101  |  21  ----------------------------<  319<H>  3.5   |  19<L>  |  1.11    Ca    7.4<L>      24 Jun 2023 07:34  Mg     1.4     06-24       PTT - ( 24 Jun 2023 07:34 )  PTT:67.1 sec Cardiology PA Adult Progress Note    SUBJECTIVE ASSESSMENT: Pt seen and examined this AM laying in bed endorsing generalized back pain, abd pain and LE pain. She states that she has not experienced any CP for the past 2 days and denies any SOB, orthopnea, PND, N/V/D, chills or HA.  	  MEDICATIONS:  metoprolol succinate ER 50 milliGRAM(s) Oral at bedtime  albuterol    90 MICROgram(s) HFA Inhaler 2 Puff(s) Inhalation every 6 hours PRN  loratadine 10 milliGRAM(s) Oral daily  acetaminophen     Tablet .. 650 milliGRAM(s) Oral every 6 hours  gabapentin 300 milliGRAM(s) Oral three times a day  oxyCODONE    IR 5 milliGRAM(s) Oral every 8 hours PRN  atorvastatin 20 milliGRAM(s) Oral at bedtime  insulin glargine Injectable (LANTUS) 30 Unit(s) SubCutaneous every morning  insulin glargine Injectable (LANTUS) 40 Unit(s) SubCutaneous at bedtime  insulin lispro (ADMELOG) corrective regimen sliding scale   SubCutaneous Before meals and at bedtime  insulin lispro Injectable (ADMELOG) 20 Unit(s) SubCutaneous three times a day before meals  aspirin enteric coated 81 milliGRAM(s) Oral daily  heparin  Infusion.  Unit(s)/Hr IV Continuous <Continuous>  magnesium sulfate  IVPB 2 Gram(s) IV Intermittent once  oxybutynin 5 milliGRAM(s) Oral three times a day  potassium chloride    Tablet ER 40 milliEquivalent(s) Oral every 4 hours  sodium chloride 0.9%. 1000 milliLiter(s) IV Continuous <Continuous>  	  VITAL SIGNS:  T(C): 36.1 (06-24-23 @ 05:46), Max: 36.5 (06-23-23 @ 13:57)  HR: 86 (06-24-23 @ 05:38) (74 - 94)  BP: 126/58 (06-24-23 @ 05:38) (115/72 - 150/61)  RR: 18 (06-24-23 @ 05:38) (18 - 18)  SpO2: 99% (06-24-23 @ 05:38) (99% - 100%)    I&O's Summary  23 Jun 2023 07:01  -  24 Jun 2023 07:00  --------------------------------------------------------  IN: 300 mL / OUT: 3550 mL / NET: -3250 mL                                     PHYSICAL EXAM:  Appearance: Normal	  HEENT: Normal oral mucosa, PERRL, EOMI	  Neck: Supple, - JVD; no Carotid Bruit   Cardiovascular: Normal S1 S2, No murmurs  Respiratory: Lungs clear to auscultation, No Rales, Rhonchi, Wheezing	  Gastrointestinal:  Soft, Non-tender, + BS	  Skin: No rashes, No ecchymoses, No cyanosis  Extremities: Normal range of motion, No clubbing, cyanosis or edema  Vascular: Peripheral pulses palpable 2+ bilaterally  Neurologic: Non-focal  Psychiatry: A & O x 3, Mood & affect appropriate    LABS:	 	                      10.3   9.66  )-----------( 344      ( 24 Jun 2023 07:34 )             33.5     06-24    135  |  101  |  21  ----------------------------<  319<H>  3.5   |  19<L>  |  1.11    Ca    7.4<L>      24 Jun 2023 07:34  Mg     1.4     06-24       PTT - ( 24 Jun 2023 07:34 )  PTT:67.1 sec

## 2023-06-24 NOTE — PROGRESS NOTE ADULT - PROBLEM SELECTOR PLAN 3
Persistent hypokalemia, despite multiple repletion on 6/21, resolved  -K  4.4 and Magnesium 2.2  -Hypokalemia likely 2/2 hyperglycemia  -Monitor electrolytes closely Persistent hypokalemia, despite multiple repletion on 6/21, now improving; HypoK likely 2/2 hyperglycemia  -K today 3.5, s/p Potassium 40mEq x 2, f/u repeat BMP @ 4pm SIRS criteria met with lactate, WBC, tachycardia, remains afebrile and non toxic appearing   -Initial WBC 16.8K, likely iso dehydration, now improved and wnl  -Lactate 7, s/p total of 5L NS bolus w/ improvement to 2.3.   -ESR 48 and CRP 32.5  -BCx 6/20, 6/21 and 6/23 NGTD  -UA: (+) WBCs, trace leuks, neg nitrite, +bacteria. UCx (+) Ecoli  -CTA Chest and CT Abd/Pelvis w/o acute findings  -s/p recent admission at [facility] for Urosepsis (Klebsiella and Ecoli discharged with left midline for tx w/ Ertapenem 6/9 and Gentamicin 6/5; pt reports missing two days tx of Ertapenem due to midline malfunction; removed ~1week ago by home care RN)  -ID consulted, no indication for continuation of abx, and recommending removal of suprapubic catheter however pt refusing due to immobility  -Ostomy nurse consulted and no active signs of infection
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
## 2023-06-24 NOTE — PROGRESS NOTE ADULT - ASSESSMENT
42yo woman w/ hx asthma, hx PE on home Eliquis, DM, HTN, HLD, hx abdominal necrotizing fasciitis s/p suprapubic catheter, ostomy placement in 11/2021 who presented to [facility] ED from home with worsening abdominal and suprapubic pain and drainage from prior suprapubic catheter insertion site, recent admission 5/23-6/1 for sepsis 2/2 UTI and catheter-site infection on gentamicin and ertapenem, who presents for acute chest pain w/ elevated troponins, medicine team consulted for management of severe sepsis and hyperglycemia.    Plan/Recommendations:  #R/O Sepsis  On prior admission 5-23-6/1 pt had presented with worsening lower abd pain, drainage from suprapubic catheter site noted in setting of elevated lactate 5.3 in setting of severe sepsis 2/2 UTI from suprapubic catheter. At that time, UCx (5/22) growing carbapenem-resistant Klebsiella & E. Coli. Blood cultures (5/23) NGTD. S/p suprapubic catheter replacement by IR on 5/25 and since treated w/ course gentamicin and ertapenem via PICC on discharge. On current admission, patient presenting with persistent pain symptoms, lactate 7.3 on admission improved w/ IVF since elevated again back up to 7.4.  - agree w/ trial off antibiotics per ID recs  - f/u BCx x2 and UCx x1  - appreciate ID recs    #Poorly-controlled IDDM  A1c 11.9, noted on admission for FSGs in 500s.  - Continue lispro 10 units before each meal.  - mISS  - appreciate endocrine recs    #Hypomagnesia  serum Mg 1.4 today 6/23  - please give IV magnesium sulfate 2g x1, replete to goal serum Mg >2.0    #R/O NSTEMI  Patient presenting w/ acute substernal chest pain w/ mildly elevated high sensitivity troponins mid 50s. Otherwise no ST changes noted on ECG.  - agree with admission to cardiac telemetry for possible ischemic eval  - f/u CCTA per cardiology team    #Suprapubic/Abd pain  - agree with IR consult to evaluate removal of suprapubic catheter per patient's wishes  - agree w/ oxybutynin 5mg TID PRN for bladder spasms  - c/w prior pain regimen: Tylenol 975mg q8hrs, Ibuprofen 200mg q6hrs, Oxycodone IR PRN (moderate 5mg q6h, severe 10mg q4h), lidocaine patch for R midback paraspinal tenderness  - c/w gabapentin 300mg TID    #Hx necrotizing fasciitis s/p ostomy  Hx of necrotizing fasciitis tx at [facility] in 11/2021 with hospital course c/b bowel resection and ostomy formation. On ROS, no increased output from ostomy bag and site appears clean/dry.  - Routine ostomy care  - Monitor ostomy output  - General surgery outpatient appointment for possible ostomy reversal.    #HTN  Home medications include Labetalol 100 mg BID and Losartan 50mg Q24.  - c/w home BP meds    #Hx Pulmonary embolism  History of bilateral pulmonary emboli in segmental and subsegmental branches on the R and subsegmental branches on the L, diagnosed on CT chest during ED visit on 4/7/23. Home meds: eliquis 5mg BID  - c/w full dose AC  - transition to home PO eliquis 5mg BID when appropriate per primary team    #Asthma  - Continue home Ventolin Q24 PRN 42yo woman w/ hx asthma, hx PE on home Eliquis, DM, HTN, HLD, hx abdominal necrotizing fasciitis s/p suprapubic catheter, ostomy placement in 11/2021 who presented to [facility] ED from home with worsening abdominal and suprapubic pain and drainage from prior suprapubic catheter insertion site, recent admission 5/23-6/1 for sepsis 2/2 UTI and catheter-site infection on gentamicin and ertapenem, who presents for acute chest pain w/ elevated troponins, medicine team consulted for management of severe sepsis and hyperglycemia.    Plan/Recommendations:  #R/O Sepsis  On prior admission 5-23-6/1 pt had presented with worsening lower abd pain, drainage from suprapubic catheter site noted in setting of elevated lactate 5.3 in setting of severe sepsis 2/2 UTI from suprapubic catheter. At that time, UCx (5/22) growing carbapenem-resistant Klebsiella & E. Coli. Blood cultures (5/23) NGTD. S/p suprapubic catheter replacement by IR on 5/25 and since treated w/ course gentamicin and ertapenem via PICC on discharge. On current admission, patient presenting with persistent pain symptoms, lactate 7.3 on admission improved w/ IVF since elevated again back up to 7.4.  - agree w/ trial off antibiotics per ID recs  - f/u BCx x2 and UCx x1  - appreciate ID recs    #Poorly-controlled IDDM  A1c 11.9, noted on admission for FSGs in 500s.  - Continue lispro 10 units before each meal.  - mISS  - appreciate endocrine recs    #Hypokalemia  serum K 3.5 today 6/24  - please replete serum potassium to goal b/t 3.8 to 4.3    #Hypomagnesia  serum Mg 1.4 today 6/24  - please give IV magnesium sulfate 2g x1, replete to goal serum Mg >2.0    #R/O NSTEMI  Patient presenting w/ acute substernal chest pain w/ mildly elevated high sensitivity troponins mid 50s. Otherwise no ST changes noted on ECG.  - agree with admission to cardiac telemetry for possible ischemic eval  - f/u CCTA per cardiology team    #Suprapubic/Abd pain  - agree with IR consult to evaluate removal of suprapubic catheter per patient's wishes  - agree w/ oxybutynin 5mg TID PRN for bladder spasms  - c/w prior pain regimen: Tylenol 975mg q8hrs, Ibuprofen 200mg q6hrs, Oxycodone IR PRN (moderate 5mg q6h, severe 10mg q4h), lidocaine patch for R midback paraspinal tenderness  - c/w gabapentin 300mg TID    #Hx necrotizing fasciitis s/p ostomy  Hx of necrotizing fasciitis tx at [facility] in 11/2021 with hospital course c/b bowel resection and ostomy formation. On ROS, no increased output from ostomy bag and site appears clean/dry.  - Routine ostomy care  - Monitor ostomy output  - General surgery outpatient appointment for possible ostomy reversal.    #HTN  Home medications include Labetalol 100 mg BID and Losartan 50mg Q24.  - c/w home BP meds    #Hx Pulmonary embolism  History of bilateral pulmonary emboli in segmental and subsegmental branches on the R and subsegmental branches on the L, diagnosed on CT chest during ED visit on 4/7/23. Home meds: eliquis 5mg BID  - c/w full dose AC  - transition to home PO eliquis 5mg BID when appropriate per primary team    #Asthma  - Continue home Ventolin Q24 PRN 44yo woman w/ hx asthma, hx PE on home Eliquis, DM, HTN, HLD, hx abdominal necrotizing fasciitis s/p suprapubic catheter, ostomy placement in 11/2021 who presented to [facility] ED from home with worsening abdominal and suprapubic pain and drainage from prior suprapubic catheter insertion site, recent admission 5/23-6/1 for sepsis 2/2 UTI and catheter-site infection on gentamicin and ertapenem, who presents for acute chest pain w/ elevated troponins, medicine team consulted for management of severe sepsis and hyperglycemia.    Plan/Recommendations:  #R/O Sepsis  On prior admission 5-23-6/1 pt had presented with worsening lower abd pain, drainage from suprapubic catheter site noted in setting of elevated lactate 5.3 in setting of severe sepsis 2/2 UTI from suprapubic catheter. At that time, UCx (5/22) growing carbapenem-resistant Klebsiella & E. Coli. Blood cultures (5/23) NGTD. S/p suprapubic catheter replacement by IR on 5/25 and since treated w/ course gentamicin and ertapenem via PICC on discharge. On current admission, patient presenting with persistent pain symptoms, lactate 7.3 on admission improved w/ IVF since elevated again back up to 7.4.  - agree w/ trial off antibiotics per ID recs  - f/u BCx x2 and UCx x1  - appreciate ID recs    #Poorly-controlled IDDM  A1c 11.9, noted on admission for FSGs in 500s.  - Continue lispro 10 units before each meal.  - mISS  - appreciate endocrine recs    #Hypokalemia  serum K 3.5 today 6/24  - please replete serum potassium to goal b/t 3.8 to 4.3    #Hypomagnesia  serum Mg 1.4 today 6/24  - please give IV magnesium sulfate 2g x1, replete to goal serum Mg >2.0    #R/O NSTEMI  Patient presenting w/ acute substernal chest pain w/ mildly elevated high sensitivity troponins mid 50s. Otherwise no ST changes noted on ECG.  - agree with admission to cardiac telemetry for possible ischemic eval  - f/u CCTA per cardiology team    #Suprapubic/Abd pain  - agree with IR consult to evaluate removal of suprapubic catheter per patient's wishes  - agree w/ oxybutynin 5mg TID PRN for bladder spasms  - c/w prior pain regimen: Tylenol 975mg q8hrs, Ibuprofen 200mg q6hrs, Oxycodone IR PRN (moderate 5mg q6h, severe 10mg q4h), lidocaine patch for R midback paraspinal tenderness  - can uptitrate gabapentin to 400mg TID    #Hx necrotizing fasciitis s/p ostomy  Hx of necrotizing fasciitis tx at [facility] in 11/2021 with hospital course c/b bowel resection and ostomy formation. On ROS, no increased output from ostomy bag and site appears clean/dry.  - Routine ostomy care  - Monitor ostomy output  - General surgery outpatient appointment for possible ostomy reversal.    #HTN  Home medications include Labetalol 100 mg BID and Losartan 50mg Q24.  - c/w home BP meds    #Hx Pulmonary embolism  History of bilateral pulmonary emboli in segmental and subsegmental branches on the R and subsegmental branches on the L, diagnosed on CT chest during ED visit on 4/7/23. Home meds: eliquis 5mg BID  - c/w full dose AC  - transition to home PO eliquis 5mg BID when appropriate per primary team    #Asthma  - Continue home Ventolin Q24 PRN

## 2023-06-24 NOTE — PROGRESS NOTE ADULT - PROBLEM SELECTOR PLAN 8
Prior admission 5/22-6/1 for UTI of suprapubic catheter,  ESBL Klebsiella  -UA/UCxs as above  -See ID recs above  -CT abd findings as above  -Per ID, wants suprapubic removed as it is likely the source of infection  -Per ID, pt with UCx showing EColi and Candida,  Wants to continue monitor off Abx.  + UCx does not necessarily imply infection. If signs of sepsis, can give trial tigecycline (given recent aminoglycoside and carbapenem exposure)--would dose 100mg IV loading dose X 1, followed by 50mg IV q12h (12h after loading dose)  - consulted, deferred case to IR as suprapubic cath was placed by them ( 2 months ago)  -IR consulted. Per IR refused removal of SPT without replacement, h/o neurogenic bladder and is unable to ambulate at this time.  Further discussion needed in regards SPT removal, will need alternate plans for SPT replacement  -c/w home oxybutinin h/o PE on 4/2023  -CTA Chest on admit negative for PE  -Holding home Eliquis 2/2 upcoming cath, c/w Heparin gtt

## 2023-06-24 NOTE — PROGRESS NOTE ADULT - PROBLEM SELECTOR PLAN 10
Persistent L>R UE and LE weakness and paresthesias  -s/p dilaudid 1mg Stat x 1, monitor closely, prolonged QTc on admit  -Gabapentin decreased to 200mg TID and Oxycodone 5mg q8h PRN Persistent L>R UE and LE weakness and paresthesias  -Increase Gabapentin to 400mg TID and uptitrate to 500mg TID in AM    F: None  E: Replete if K<4 or Mag<2  N: DASH Diet  VTEppx: Heparin gtt  Dispo: Cardiac tele Persistent L>R UE and LE weakness and paresthesias  -Increase Gabapentin to 400mg TID and uptitrate to 500mg TID in AM Persistent L>R UE and LE weakness and paresthesias  -Increase Gabapentin to 400mg TID and uptitrate to 500mg TID in AM      F: None  E: Replete if K<4 or Mag<2  N: DASH Diet  VTEppx: Heparin gtt  Dispo: Cardiac tele

## 2023-06-25 LAB
-  AMPICILLIN: SIGNIFICANT CHANGE UP
-  CIPROFLOXACIN: SIGNIFICANT CHANGE UP
-  NITROFURANTOIN: SIGNIFICANT CHANGE UP
-  TETRACYCLINE: SIGNIFICANT CHANGE UP
-  VANCOMYCIN: SIGNIFICANT CHANGE UP
ALBUMIN SERPL ELPH-MCNC: 3.2 G/DL — LOW (ref 3.3–5)
ALP SERPL-CCNC: 140 U/L — HIGH (ref 40–120)
ALT FLD-CCNC: 11 U/L — SIGNIFICANT CHANGE UP (ref 10–45)
ANION GAP SERPL CALC-SCNC: 13 MMOL/L — SIGNIFICANT CHANGE UP (ref 5–17)
APTT BLD: 70.2 SEC — HIGH (ref 27.5–35.5)
AST SERPL-CCNC: 14 U/L — SIGNIFICANT CHANGE UP (ref 10–40)
BASOPHILS # BLD AUTO: 0.03 K/UL — SIGNIFICANT CHANGE UP (ref 0–0.2)
BASOPHILS NFR BLD AUTO: 0.3 % — SIGNIFICANT CHANGE UP (ref 0–2)
BILIRUB SERPL-MCNC: <0.2 MG/DL — SIGNIFICANT CHANGE UP (ref 0.2–1.2)
BUN SERPL-MCNC: 26 MG/DL — HIGH (ref 7–23)
CALCIUM SERPL-MCNC: 8 MG/DL — LOW (ref 8.4–10.5)
CHLORIDE SERPL-SCNC: 102 MMOL/L — SIGNIFICANT CHANGE UP (ref 96–108)
CO2 SERPL-SCNC: 24 MMOL/L — SIGNIFICANT CHANGE UP (ref 22–31)
CREAT SERPL-MCNC: 1.3 MG/DL — SIGNIFICANT CHANGE UP (ref 0.5–1.3)
CULTURE RESULTS: SIGNIFICANT CHANGE UP
EGFR: 52 ML/MIN/1.73M2 — LOW
EOSINOPHIL # BLD AUTO: 0.12 K/UL — SIGNIFICANT CHANGE UP (ref 0–0.5)
EOSINOPHIL NFR BLD AUTO: 1.4 % — SIGNIFICANT CHANGE UP (ref 0–6)
GLUCOSE BLDC GLUCOMTR-MCNC: 108 MG/DL — HIGH (ref 70–99)
GLUCOSE BLDC GLUCOMTR-MCNC: 116 MG/DL — HIGH (ref 70–99)
GLUCOSE BLDC GLUCOMTR-MCNC: 160 MG/DL — HIGH (ref 70–99)
GLUCOSE BLDC GLUCOMTR-MCNC: 256 MG/DL — HIGH (ref 70–99)
GLUCOSE BLDC GLUCOMTR-MCNC: 312 MG/DL — HIGH (ref 70–99)
GLUCOSE SERPL-MCNC: 262 MG/DL — HIGH (ref 70–99)
HCT VFR BLD CALC: 35.4 % — SIGNIFICANT CHANGE UP (ref 34.5–45)
HGB BLD-MCNC: 10.5 G/DL — LOW (ref 11.5–15.5)
IMM GRANULOCYTES NFR BLD AUTO: 0.3 % — SIGNIFICANT CHANGE UP (ref 0–0.9)
LYMPHOCYTES # BLD AUTO: 3.49 K/UL — HIGH (ref 1–3.3)
LYMPHOCYTES # BLD AUTO: 40 % — SIGNIFICANT CHANGE UP (ref 13–44)
MAGNESIUM SERPL-MCNC: 1.5 MG/DL — LOW (ref 1.6–2.6)
MCHC RBC-ENTMCNC: 23.8 PG — LOW (ref 27–34)
MCHC RBC-ENTMCNC: 29.7 GM/DL — LOW (ref 32–36)
MCV RBC AUTO: 80.3 FL — SIGNIFICANT CHANGE UP (ref 80–100)
METHOD TYPE: SIGNIFICANT CHANGE UP
MONOCYTES # BLD AUTO: 0.49 K/UL — SIGNIFICANT CHANGE UP (ref 0–0.9)
MONOCYTES NFR BLD AUTO: 5.6 % — SIGNIFICANT CHANGE UP (ref 2–14)
NEUTROPHILS # BLD AUTO: 4.56 K/UL — SIGNIFICANT CHANGE UP (ref 1.8–7.4)
NEUTROPHILS NFR BLD AUTO: 52.4 % — SIGNIFICANT CHANGE UP (ref 43–77)
NRBC # BLD: 0 /100 WBCS — SIGNIFICANT CHANGE UP (ref 0–0)
ORGANISM # SPEC MICROSCOPIC CNT: SIGNIFICANT CHANGE UP
ORGANISM # SPEC MICROSCOPIC CNT: SIGNIFICANT CHANGE UP
PHOSPHATE SERPL-MCNC: 2.9 MG/DL — SIGNIFICANT CHANGE UP (ref 2.5–4.5)
PLATELET # BLD AUTO: 409 K/UL — HIGH (ref 150–400)
POTASSIUM SERPL-MCNC: 3.8 MMOL/L — SIGNIFICANT CHANGE UP (ref 3.5–5.3)
POTASSIUM SERPL-SCNC: 3.8 MMOL/L — SIGNIFICANT CHANGE UP (ref 3.5–5.3)
PROT SERPL-MCNC: 7 G/DL — SIGNIFICANT CHANGE UP (ref 6–8.3)
RBC # BLD: 4.41 M/UL — SIGNIFICANT CHANGE UP (ref 3.8–5.2)
RBC # FLD: 15.5 % — HIGH (ref 10.3–14.5)
SODIUM SERPL-SCNC: 139 MMOL/L — SIGNIFICANT CHANGE UP (ref 135–145)
SPECIMEN SOURCE: SIGNIFICANT CHANGE UP
WBC # BLD: 8.72 K/UL — SIGNIFICANT CHANGE UP (ref 3.8–10.5)
WBC # FLD AUTO: 8.72 K/UL — SIGNIFICANT CHANGE UP (ref 3.8–10.5)

## 2023-06-25 PROCEDURE — 99233 SBSQ HOSP IP/OBS HIGH 50: CPT

## 2023-06-25 PROCEDURE — 93010 ELECTROCARDIOGRAM REPORT: CPT

## 2023-06-25 RX ORDER — DIPHENHYDRAMINE HCL 50 MG
50 CAPSULE ORAL ONCE
Refills: 0 | Status: COMPLETED | OUTPATIENT
Start: 2023-06-26 | End: 2023-06-26

## 2023-06-25 RX ORDER — GABAPENTIN 400 MG/1
500 CAPSULE ORAL THREE TIMES A DAY
Refills: 0 | Status: COMPLETED | OUTPATIENT
Start: 2023-06-25 | End: 2023-06-25

## 2023-06-25 RX ORDER — HYDROCORTISONE 20 MG
200 TABLET ORAL ONCE
Refills: 0 | Status: COMPLETED | OUTPATIENT
Start: 2023-06-26 | End: 2023-06-26

## 2023-06-25 RX ORDER — HYDROMORPHONE HYDROCHLORIDE 2 MG/ML
0.5 INJECTION INTRAMUSCULAR; INTRAVENOUS; SUBCUTANEOUS ONCE
Refills: 0 | Status: DISCONTINUED | OUTPATIENT
Start: 2023-06-25 | End: 2023-06-25

## 2023-06-25 RX ORDER — HYDROMORPHONE HYDROCHLORIDE 2 MG/ML
1 INJECTION INTRAMUSCULAR; INTRAVENOUS; SUBCUTANEOUS ONCE
Refills: 0 | Status: DISCONTINUED | OUTPATIENT
Start: 2023-06-25 | End: 2023-06-25

## 2023-06-25 RX ORDER — MAGNESIUM SULFATE 500 MG/ML
2 VIAL (ML) INJECTION ONCE
Refills: 0 | Status: COMPLETED | OUTPATIENT
Start: 2023-06-25 | End: 2023-06-25

## 2023-06-25 RX ORDER — OXYCODONE HYDROCHLORIDE 5 MG/1
5 TABLET ORAL EVERY 4 HOURS
Refills: 0 | Status: DISCONTINUED | OUTPATIENT
Start: 2023-06-25 | End: 2023-06-26

## 2023-06-25 RX ORDER — SODIUM CHLORIDE 9 MG/ML
250 INJECTION INTRAMUSCULAR; INTRAVENOUS; SUBCUTANEOUS ONCE
Refills: 0 | Status: DISCONTINUED | OUTPATIENT
Start: 2023-06-26 | End: 2023-06-26

## 2023-06-25 RX ORDER — GABAPENTIN 400 MG/1
600 CAPSULE ORAL THREE TIMES A DAY
Refills: 0 | Status: DISCONTINUED | OUTPATIENT
Start: 2023-06-26 | End: 2023-06-29

## 2023-06-25 RX ORDER — POTASSIUM CHLORIDE 20 MEQ
40 PACKET (EA) ORAL ONCE
Refills: 0 | Status: COMPLETED | OUTPATIENT
Start: 2023-06-25 | End: 2023-06-25

## 2023-06-25 RX ORDER — GABAPENTIN 400 MG/1
500 CAPSULE ORAL THREE TIMES A DAY
Refills: 0 | Status: DISCONTINUED | OUTPATIENT
Start: 2023-06-25 | End: 2023-06-25

## 2023-06-25 RX ADMIN — Medication 6: at 06:36

## 2023-06-25 RX ADMIN — INSULIN GLARGINE 40 UNIT(S): 100 INJECTION, SOLUTION SUBCUTANEOUS at 21:53

## 2023-06-25 RX ADMIN — HYDROMORPHONE HYDROCHLORIDE 0.5 MILLIGRAM(S): 2 INJECTION INTRAMUSCULAR; INTRAVENOUS; SUBCUTANEOUS at 03:07

## 2023-06-25 RX ADMIN — ATORVASTATIN CALCIUM 20 MILLIGRAM(S): 80 TABLET, FILM COATED ORAL at 22:05

## 2023-06-25 RX ADMIN — Medication 5 MILLIGRAM(S): at 22:05

## 2023-06-25 RX ADMIN — HYDROMORPHONE HYDROCHLORIDE 0.5 MILLIGRAM(S): 2 INJECTION INTRAMUSCULAR; INTRAVENOUS; SUBCUTANEOUS at 19:41

## 2023-06-25 RX ADMIN — OXYCODONE HYDROCHLORIDE 5 MILLIGRAM(S): 5 TABLET ORAL at 13:12

## 2023-06-25 RX ADMIN — Medication 650 MILLIGRAM(S): at 06:05

## 2023-06-25 RX ADMIN — OXYCODONE HYDROCHLORIDE 5 MILLIGRAM(S): 5 TABLET ORAL at 16:24

## 2023-06-25 RX ADMIN — HEPARIN SODIUM 1800 UNIT(S)/HR: 5000 INJECTION INTRAVENOUS; SUBCUTANEOUS at 20:57

## 2023-06-25 RX ADMIN — Medication 50 MILLIGRAM(S): at 07:03

## 2023-06-25 RX ADMIN — HYDROMORPHONE HYDROCHLORIDE 0.5 MILLIGRAM(S): 2 INJECTION INTRAMUSCULAR; INTRAVENOUS; SUBCUTANEOUS at 04:07

## 2023-06-25 RX ADMIN — Medication 1 TABLET(S): at 11:05

## 2023-06-25 RX ADMIN — Medication 81 MILLIGRAM(S): at 11:04

## 2023-06-25 RX ADMIN — HYDROMORPHONE HYDROCHLORIDE 0.5 MILLIGRAM(S): 2 INJECTION INTRAMUSCULAR; INTRAVENOUS; SUBCUTANEOUS at 18:51

## 2023-06-25 RX ADMIN — HEPARIN SODIUM 1800 UNIT(S)/HR: 5000 INJECTION INTRAVENOUS; SUBCUTANEOUS at 08:53

## 2023-06-25 RX ADMIN — Medication 2: at 17:12

## 2023-06-25 RX ADMIN — OXYCODONE HYDROCHLORIDE 5 MILLIGRAM(S): 5 TABLET ORAL at 06:04

## 2023-06-25 RX ADMIN — Medication 30 UNIT(S): at 12:31

## 2023-06-25 RX ADMIN — HYDROMORPHONE HYDROCHLORIDE 0.5 MILLIGRAM(S): 2 INJECTION INTRAMUSCULAR; INTRAVENOUS; SUBCUTANEOUS at 09:20

## 2023-06-25 RX ADMIN — OXYCODONE HYDROCHLORIDE 5 MILLIGRAM(S): 5 TABLET ORAL at 07:05

## 2023-06-25 RX ADMIN — Medication 5 MILLIGRAM(S): at 06:01

## 2023-06-25 RX ADMIN — HYDROMORPHONE HYDROCHLORIDE 0.5 MILLIGRAM(S): 2 INJECTION INTRAMUSCULAR; INTRAVENOUS; SUBCUTANEOUS at 09:33

## 2023-06-25 RX ADMIN — HEPARIN SODIUM 1800 UNIT(S)/HR: 5000 INJECTION INTRAVENOUS; SUBCUTANEOUS at 13:56

## 2023-06-25 RX ADMIN — OXYCODONE HYDROCHLORIDE 5 MILLIGRAM(S): 5 TABLET ORAL at 12:12

## 2023-06-25 RX ADMIN — GABAPENTIN 500 MILLIGRAM(S): 400 CAPSULE ORAL at 22:05

## 2023-06-25 RX ADMIN — OXYCODONE HYDROCHLORIDE 5 MILLIGRAM(S): 5 TABLET ORAL at 15:24

## 2023-06-25 RX ADMIN — Medication 8: at 21:52

## 2023-06-25 RX ADMIN — OXYCODONE HYDROCHLORIDE 5 MILLIGRAM(S): 5 TABLET ORAL at 22:05

## 2023-06-25 RX ADMIN — OXYCODONE HYDROCHLORIDE 5 MILLIGRAM(S): 5 TABLET ORAL at 23:05

## 2023-06-25 RX ADMIN — Medication 25 GRAM(S): at 09:34

## 2023-06-25 RX ADMIN — HEPARIN SODIUM 1800 UNIT(S)/HR: 5000 INJECTION INTRAVENOUS; SUBCUTANEOUS at 20:26

## 2023-06-25 RX ADMIN — Medication 30 UNIT(S): at 07:02

## 2023-06-25 RX ADMIN — Medication 40 MILLIEQUIVALENT(S): at 08:50

## 2023-06-25 RX ADMIN — HYDROMORPHONE HYDROCHLORIDE 0.5 MILLIGRAM(S): 2 INJECTION INTRAMUSCULAR; INTRAVENOUS; SUBCUTANEOUS at 08:49

## 2023-06-25 RX ADMIN — HYDROMORPHONE HYDROCHLORIDE 0.5 MILLIGRAM(S): 2 INJECTION INTRAMUSCULAR; INTRAVENOUS; SUBCUTANEOUS at 10:30

## 2023-06-25 RX ADMIN — GABAPENTIN 400 MILLIGRAM(S): 400 CAPSULE ORAL at 06:02

## 2023-06-25 RX ADMIN — HEPARIN SODIUM 1800 UNIT(S)/HR: 5000 INJECTION INTRAVENOUS; SUBCUTANEOUS at 08:14

## 2023-06-25 RX ADMIN — INSULIN GLARGINE 40 UNIT(S): 100 INJECTION, SOLUTION SUBCUTANEOUS at 06:35

## 2023-06-25 RX ADMIN — Medication 650 MILLIGRAM(S): at 07:05

## 2023-06-25 NOTE — PROGRESS NOTE ADULT - PROBLEM SELECTOR PLAN 10
Persistent L>R UE and LE weakness and paresthesias  -Increase Gabapentin to 400mg TID and uptitrate to 500mg TID in AM      F: None  E: Replete if K<4 or Mag<2  N: DASH Diet  VTEppx: Heparin gtt  Dispo: Cardiac tele Persistent L>R UE and LE weakness and paresthesias  -Increase Gabapentin to 500mg TID and uptitrate to 600mg TID in AM  -outpt pain management      F: None  E: Replete if K<4 or Mag<2  N: DASH Diet  VTEppx: Heparin gtt  Dispo: Cardiac tele Persistent L>R UE and LE weakness and paresthesias  -Increase Gabapentin to 500mg TID and uptitrate to 600mg TID in AM  (if Cr remains stable)      #Chronic back pain  -pt endorsing 11/10 generalized pain and likely pain seeking. Pt demanding Dilaudid 1mg IV Q6 and states it is prescribed in outpt, however confirmed w/ outpt pharmacy and pt only prescribed Oxycodone 5mg Q6 PRN  -Medicine co-managing, recommending outpt pain management f/u  -Continue Oxycodone 5mg Q4 PRN, Tylenol 650mg Q8 PRN and above Gabapentin      F: None  E: Replete if K<4 or Mag<2  N: DASH Diet  VTEppx: Heparin gtt  Dispo: Cardiac tele

## 2023-06-25 NOTE — PROGRESS NOTE ADULT - PROBLEM SELECTOR PLAN 9
Prior admission 5/22-6/1 for UTI of suprapubic catheter,  ESBL Klebsiella  -UA/UCxs as above  -See ID recs above  -CT abd findings as above  -Per ID, wants suprapubic removed as it is likely the source of infection  -Per ID, pt with UCx showing EColi and Candida,  Wants to continue monitor off Abx.  + UCx does not necessarily imply infection. If signs of sepsis, can give trial tigecycline (given recent aminoglycoside and carbapenem exposure)--would dose 100mg IV loading dose X 1, followed by 50mg IV q12h (12h after loading dose)  -IR consulted. Per IR refused removal of SPT without replacement, h/o neurogenic bladder and is unable to ambulate at this time.  Further discussion needed in regards SPT removal, will need alternate plans for SPT replacement  -c/w home oxybutinin Prior admission 5/22-6/1 for UTI of suprapubic catheter,  ESBL Klebsiella  -UA/UCxs as above  -CT abd findings as above  -Per ID, wants suprapubic removed as it is likely the source of infection  -Per ID, pt with UCx showing EColi and Candida,  Wants to continue monitor off Abx.  + UCx does not necessarily imply infection. If signs of sepsis, can give trial tigecycline (given recent aminoglycoside and carbapenem exposure)--would dose 100mg IV loading dose X 1, followed by 50mg IV q12h (12h after loading dose)  -IR consulted. Per IR refused removal of SPT without replacement, h/o neurogenic bladder and is unable to ambulate at this time.  Further discussion needed in regards SPT removal, will need alternate plans for SPT replacement  -c/w home oxybutinin Prior admission 5/22-6/1 for UTI of suprapubic catheter,  ESBL Klebsiella  -UA/UCxs as above, per ID will monitor off abx. If pt becomes febrile can trial Tigecycline 100mg IV x 1 > 50mg BID (12hr after loading dose)  -CT abd findings as above  -ID recommending removal of suprapubic catheter, however pt currently refusing due to immobility  -Continue Oxybutinin 5mg TID

## 2023-06-25 NOTE — PROGRESS NOTE ADULT - PROBLEM SELECTOR PLAN 3
SIRS criteria met w/ lactate, WBC, and tachycardia, pt remains afebrile and non toxic appearing   -Initial WBC 16.8K, likely i/s/o dehydration, now improved and wnl  -Lactate 7, s/p total of 5L NS bolus w/ improvement to 2.3  -ESR 48 and CRP 32.5  -Serial BCx 6/20, 6/21 and 6/23 NGTD  -UA: (+) WBCs, trace leuks, neg nitrite, +bacteria. UCx (+) Ecoli  -CTA Chest and CT Abd/Pelvis w/o acute findings  -s/p recent admission at [facility] for Urosepsis (Klebsiella and Ecoli discharged with left midline for tx w/ Ertapenem 6/9 and Gentamicin 6/5; pt reports missing two days tx of Ertapenem due to midline malfunction; removed ~1week ago by home care RN)  -ID consulted, no indication for abx and recommending removal of suprapubic catheter however pt refusing due to immobility  -Ostomy nurse consulted and no active signs of infection SIRS criteria met w/ lactate, WBC, and tachycardia, pt remains afebrile and non toxic appearing   -Initial WBC 16.8K, likely i/s/o dehydration, now improved and wnl  -Lactate 7, s/p total of 5L NS bolus w/ improvement to 2.3  -ESR 48 and CRP 32.5  -Serial BCx 6/20, 6/21 and 6/23 NGTD  -UA: (+) WBCs, trace leuks, neg nitrite, +bacteria. UCx (+) Ecoli  -CTA Chest and CT Abd/Pelvis w/o acute findings  -ID consulted, no indication for abx and recommending removal of suprapubic catheter however pt refusing due to immobility. If pt becomes febrile, can trial Tigecycline 100mg IV x 1 > 50mg BID (12hr after loading dose)  -Ostomy nurse consulted and no active signs of infection  -of note, recent admission at [facility] for Urosepsis (Klebsiella and Ecoli discharged with left midline for tx w/ Ertapenem 6/9 and Gentamicin 6/5; pt reports missing two days tx of Ertapenem due to midline malfunction; removed ~1week ago by home care RN)

## 2023-06-25 NOTE — PROGRESS NOTE ADULT - SUBJECTIVE AND OBJECTIVE BOX
Vital Signs Last 24 Hrs  T(C): 36.2 (25 Jun 2023 09:29), Max: 37 (24 Jun 2023 22:34)  T(F): 97.1 (25 Jun 2023 09:29), Max: 98.6 (24 Jun 2023 22:34)  HR: 86 (25 Jun 2023 11:06) (76 - 92)  BP: 128/57 (25 Jun 2023 11:06) (104/55 - 134/59)  BP(mean): 88 (25 Jun 2023 06:49) (74 - 88)  ABP: --  ABP(mean): --  RR: 18 (25 Jun 2023 11:06) (18 - 18)  SpO2: 98% (25 Jun 2023 11:06) (96% - 99%)    O2 Parameters below as of 25 Jun 2023 11:06  Patient On (Oxygen Delivery Method): room air      pt stated she is dizzy/barely opening the eye initially, then later stated she has headache  -stated that during cleaning this morning-the cleaning cloth got stuck around the supra-pubic catheter= she felt the tube came out a little back --so she put the tube back in and felt is bleeding  she allow exam - asking me to hold the diapers when she hold the skin fold up to see the skin around the catheter- there is a gauge around the catheter with some pinkish color ( likely is from the skin= the urine in the catheter and the bag is yellow color   - she asked whether i can put order for dilaudid 1 mg x 1 dose now ( i explained the skin is raw and we could use nystatin powder for the area to help sooth the pain ( due to her body habitus and skin fold the area around the catheter is mild erythema

## 2023-06-25 NOTE — PROGRESS NOTE ADULT - PROBLEM SELECTOR PLAN 1
presents w/ substernal chest tightness at rest, currently CP free and HD stable  -HsTrop T 55->52-->32  -EKG: NSR, non ischemic  -TTE 6/21: hyperdynamic LVEF, mild LVH, no valvular disease  -CCTA 6/23: Ca score 129 (accuracy of score is limited by image noise), probable severe pRCA stenosis, mod D2 stenosis, non obstructive in remaining coronaries  -NPO after MN for cardiac cath in AM, pt consented  -  Pre cath load: s/p ASA 325mg in ED and will require Plavix 600mg in AM prior to cath  -  Pre cath IVF hydration: NS 250cc bolus followed by maintenance 75cc/hr x 12hrs  -  Premedication for contrast allergy: Solucortef 200mg IVP x 1 and Benadryl 50mg IVP pre and post cath  -Continue ASA 81mg QD, Lipitor 40mg HS and Toprol 50mg QD presents w/ substernal chest tightness at rest, currently CP free and HD stable  -HsTrop T 55->52-->32  -EKG: NSR, non ischemic  -TTE 6/21: hyperdynamic LVEF, mild LVH, no valvular disease  -CCTA 6/23: Ca score 129 (accuracy of score is limited by image noise), probable severe pRCA stenosis, mod D2 stenosis, non obstructive in remaining coronaries  -NPO after MN for cardiac cath in AM, pt consented  -  Pre cath load: s/p ASA 325mg in ED and will require Plavix 600mg in AM prior to cath  -  Pre cath IVF hydration: NS 250cc bolus followed by maintenance 75cc/hr x 2hrs  -  Premedication for contrast allergy: Solucortef 200mg IVP x 1 and Benadryl 50mg IVP pre and post cath  -Continue ASA 81mg QD, Lipitor 40mg HS and Toprol 50mg QD
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
## 2023-06-25 NOTE — PROGRESS NOTE ADULT - ATTENDING COMMENTS
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,  PE (on Eliquis last dose AM 6/20/23), DM2-poorly controlled on insulin therapy, HTN, HLD, hx abdominal necrotizing fasciitis (s/p suprapubic catheter, ostomy placement in 11/2021), s/p intubation, hx of frequent UTIs 2/2 to suprapubic catheter, most recent UTI c/b Urosepsis admitted [facility] 5/23-6/1 (Klebsiella and Ecoli discharged with left midline for tx w/ Ertapenem 6/9 and Gentamicin 6/5; pt reports missing two days tx of Ertapenem due to midline malfunction; removed ~1week ago by home care RN)  who presesents to [facility] ED on 6/20/23 for chest pain ( pressure like sensation, Leucytosis, severe hyperglycemia ( more than 600s ), LORA , lactic acidosis - admitted to tele for elevated troponin and chest pain concern ACS.       - SIRS from non-infectious etiology ( leucocytosis / LORA/ lactic acidosis -electrolyte derangements -uncontrolled DM with hyperglycemia and mild DKA on admission -now much improved - though sugar still fluctuating -she required Steroids for iodine allergy in prep for CT scan  supra-pubic pain with recent treated MDR UTI - now UA with bacteria/LE -no other source of infection seen,   leucocytosis resolved without antibiotics,   lactic acidosis resolved with IVF/insulin- received more than 4 L of IVF  ID evaluated patient 6/21-recommend to monitor off antibiotics  - we talked about removing supra-pubic catheter, she talked about hard for her to get up and get to bathroom due to her left leg weakness from prior cva and declined to have that removed, we continue to encourage her to consider to have supra-pubic catheter removed ( have catheter for convenience )  - monitor cbc/ fever curve and to start antibiotics as per ID rec.     - DM ( on home insulin with Hyperglycemia )- poorly controlled- hb A1c 11 -non compliance with insulin   presented with Hyperglycemia/ Hypokalemia/ AG 24 on admission, LORA- cr 1.8 - given insulin, IVF, Glucose went to >600 after single dose of solumedrol given due to allergy to iodine prior to CT scan - now gap closed, fluctuating glucose level , nausea with poor oral intake   ? hyperglycemia related to infection/sepsis vs non-compliance with meds.  hypokalemia/ HypoMg - to replace goal for K around 4 to 4.5, Mg around 2.   - endocrine has been consulted ( pt known to endo service )     - chest pain with elevated troponin- NSTEMI,  - pt with hx of DM -work up in progress for ACS vs type 2 from Demand ischemia from metabolic derangement   - TTE -EF 70-75 %   -CTTA " The calcium score is  129 Agatston units (accuracy of score is   limited by image noise),  Probable severe proximal RCA stenosis,  Moderate D2 stenosis (small vessel),  Non-obstructive disease in the remaining coronary segments  dw cardiology -plan for [facility] monday.    -- hx of PE on eliquis-- currently  on heparin gtt.     - colostomy bag looks clean, due to her body habitus, skin fold around the supra-pubic catheter area, site tender ( no erythema noted ) would suggest IR/ EU evaluation for Catheter removal - ( Suprapubic catheter was placed for convenience as per record, now that she can ambulate few steps we encouraged her to use camote   ) prn meds for bladder spasm. ( can use oxybutynin     - LORA on admission due to dehydration/Electrolyte derangement from Hyperglycemia- now improved- Cr is 1.1 now--> 1.3 today.     - neuropathic pain - reported gabapentin helps- would renally dose gabapentin -now with improvement in renal function to increase gabapentin to 500 tid today 600 tid on Monday- ( reported home dose 600 mg tid -) depending on renal function.   - add tylenol 650 mg po q 8 hourly standing for Chronic pain  - reported Chronic pain -more than one year now, worse for couple of months now- we had lengthy discussion that opoids is not the good choice for chronic pain, currently getting oxycodone 5 mg po prn and asking for IV dilaudid higher dose   - would keep oxycodone 5 mg po q 6 hourly prn ( that will give total 20 mg over 24 hours, reported taking oxy 10 mg bid max at home is 20 mg in 24 hours )    - we talked about pain- titrating pain meds- increased gabapentin to 500 mg bid today, continue with Tylenol standing ( she stated tylenol dose not help=explained tylenol will help augment the effect of oxy - she is on oxycodone 5 mg po q 6 hourly ( getting about one to two doses per day - explained she could ask for oxy 5 mg every 6 hours as needed   - iv dilaudid prn for breakthrough pain -try to minimize dilaudid prn           thank you for allowing medicine to participate in the care, dw cardiology 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,  PE (on Eliquis last dose AM 6/20/23), DM2-poorly controlled on insulin therapy, HTN, HLD, hx abdominal necrotizing fasciitis (s/p suprapubic catheter, ostomy placement in 11/2021), s/p intubation, hx of frequent UTIs 2/2 to suprapubic catheter, most recent UTI c/b Urosepsis admitted [facility] 5/23-6/1 (Klebsiella and Ecoli discharged with left midline for tx w/ Ertapenem 6/9 and Gentamicin 6/5; pt reports missing two days tx of Ertapenem due to midline malfunction; removed ~1week ago by home care RN)  who presesents to [facility] ED on 6/20/23 for chest pain ( pressure like sensation, Leucytosis, severe hyperglycemia ( more than 600s ), LORA , lactic acidosis - admitted to tele for elevated troponin and chest pain concern ACS.       - SIRS from non-infectious etiology ( leucocytosis / LORA/ lactic acidosis -electrolyte derangements -uncontrolled DM with hyperglycemia and mild DKA on admission -now much improved - though sugar still fluctuating -she required Steroids for iodine allergy in prep for CT scan  supra-pubic pain with recent treated MDR UTI - now UA with bacteria/LE -no other source of infection seen,   leucocytosis resolved without antibiotics,   lactic acidosis resolved with IVF/insulin- received more than 4 L of IVF  ID evaluated patient 6/21-recommend to monitor off antibiotics  - we talked about removing supra-pubic catheter, she talked about hard for her to get up and get to bathroom due to her left leg weakness from prior cva and declined to have that removed, we continue to encourage her to consider to have supra-pubic catheter removed ( have catheter for convenience )  - monitor cbc/ fever curve and to start antibiotics as per ID rec.     - nystatin powder to apply to skin fold around the lower abdomen.     - DM ( on home insulin with Hyperglycemia )- poorly controlled- hb A1c 11 -non compliance with insulin   presented with Hyperglycemia/ Hypokalemia/ AG 24 on admission, LORA- cr 1.8 - given insulin, IVF, Glucose went to >600 after single dose of solumedrol given due to allergy to iodine prior to CT scan - now gap closed, fluctuating glucose level , nausea with poor oral intake   ? hyperglycemia related to infection/sepsis vs non-compliance with meds.  hypokalemia/ HypoMg - to replace goal for K around 4 to 4.5, Mg around 2.   - endocrine has been consulted ( pt known to endo service )     - chest pain with elevated troponin- NSTEMI,  - pt with hx of DM -work up in progress for ACS vs type 2 from Demand ischemia from metabolic derangement   - TTE -EF 70-75 %   -CTTA " The calcium score is  129 Agatston units (accuracy of score is   limited by image noise),  Probable severe proximal RCA stenosis,  Moderate D2 stenosis (small vessel),  Non-obstructive disease in the remaining coronary segments  dw cardiology -plan for [facility] monday.    -- hx of PE on eliquis-- currently  on heparin gtt.     - colostomy bag looks clean, due to her body habitus, skin fold around the supra-pubic catheter area, site tender ( no erythema noted ) would suggest IR/ EU evaluation for Catheter removal - ( Suprapubic catheter was placed for convenience as per record, now that she can ambulate few steps we encouraged her to use camote   ) prn meds for bladder spasm. ( can use oxybutynin     - LORA on admission due to dehydration/Electrolyte derangement from Hyperglycemia- now improved- Cr is 1.1 now--> 1.3 today.     - neuropathic pain - reported gabapentin helps- would renally dose gabapentin -now with improvement in renal function to increase gabapentin to 500 tid today 600 tid on Monday- ( reported home dose 600 mg tid -) depending on renal function.   - add tylenol 650 mg po q 8 hourly standing for Chronic pain  - reported Chronic pain -more than one year now, worse for couple of months now- we had lengthy discussion that opoids is not the good choice for chronic pain, currently getting oxycodone 5 mg po prn and asking for IV dilaudid higher dose   - would keep oxycodone 5 mg po q 6 hourly prn ( that will give total 20 mg over 24 hours, reported taking oxy 10 mg bid max at home is 20 mg in 24 hours )    - we talked about pain- titrating pain meds- increased gabapentin to 500 mg bid today, continue with Tylenol standing ( she stated tylenol dose not help=explained tylenol will help augment the effect of oxy - she is on oxycodone 5 mg po q 6 hourly ( getting about one to two doses per day - explained she could ask for oxy 5 mg every 6 hours as needed   - iv dilaudid prn for breakthrough pain -try to minimize dilaudid prn           thank you for allowing medicine to participate in the care, dw cardiology

## 2023-06-25 NOTE — PROGRESS NOTE ADULT - PROBLEM SELECTOR PLAN 4
Persistent hypokalemia, despite multiple repletion on 6/21, now improving; HypoK likely 2/2 hyperglycemia  -K today 3.8, s/p Potassium 40mEq x 1  -Continue to trend daily and supplement w/ goal K >4

## 2023-06-25 NOTE — PROGRESS NOTE ADULT - PROBLEM SELECTOR PLAN 5
RESOLVED; presented w/ Cr 1.86, likely i/s/o dehydration  -s/p a total of 5L NS bolus w/ improvement  -Ulytes c/w pre-renal  -Continue to monitor

## 2023-06-25 NOTE — PROGRESS NOTE ADULT - PROBLEM SELECTOR PLAN 8
h/o PE on 4/2023  -CTA Chest on admit negative for PE  -Holding home Eliquis 2/2 upcoming cath, c/w Heparin gtt

## 2023-06-25 NOTE — PROGRESS NOTE ADULT - PROBLEM SELECTOR PLAN 2
presents w/ Hyperglycemia, BG >550 and A1c 11.9%, Endocrinology following  -Anion gap 24 on admission, now closing at 17  -BHB 0.5--> 0.1  -BS noted to increase again to 500s on 6/23 after administration of steroids prior to CCTA for contrast allergy  -As discussed w/ Endo, i/s/o upcoming cath and solucortef, c/w Lantus 40u BID and Lispro 30u TID w/ meals  -Continue mISS QID and FS QID

## 2023-06-25 NOTE — PROGRESS NOTE ADULT - ASSESSMENT
43F w/ contrast allergy and PMHx of HTN, HLD, poorly controlled DM-II, CVA (11/2021, residual LE weakness L>R), PE 4/2023 (on Eliquis), Asthma, abd necrotizing fasciitis s/p suprapubic catheter and ostomy in place (11/2021, requiring intubation at that time), and frequent UTIs 2/2 suprapubic catheter w/ recent hospitalization for Urosepsis 5/23-6/1 (Klebsiella and EColi, was discharged w/ L Midline for IV abx), returns to [facility] ED 6/20 c/o abd pain, nausea, CP and SOB, found to be hyperglycemic w/ -600s, hypokalemic and elevated troponin, pt now admitted to cardiac tele for r/o ACS. Pt found to have abnormal CCTA, pending cardiac cath 6/26. Medicine and ID consulted for SIRS, currently off abx and stable. Endo following for poorly controlled DM.

## 2023-06-26 ENCOUNTER — TRANSCRIPTION ENCOUNTER (OUTPATIENT)
Age: 43
End: 2023-06-26

## 2023-06-26 LAB
ALBUMIN SERPL ELPH-MCNC: 3.4 G/DL — SIGNIFICANT CHANGE UP (ref 3.3–5)
ALP SERPL-CCNC: 126 U/L — HIGH (ref 40–120)
ALT FLD-CCNC: 9 U/L — LOW (ref 10–45)
ANION GAP SERPL CALC-SCNC: 12 MMOL/L — SIGNIFICANT CHANGE UP (ref 5–17)
APTT BLD: 36.6 SEC — HIGH (ref 27.5–35.5)
AST SERPL-CCNC: 13 U/L — SIGNIFICANT CHANGE UP (ref 10–40)
BASOPHILS # BLD AUTO: 0.01 K/UL — SIGNIFICANT CHANGE UP (ref 0–0.2)
BASOPHILS NFR BLD AUTO: 0.1 % — SIGNIFICANT CHANGE UP (ref 0–2)
BILIRUB SERPL-MCNC: <0.2 MG/DL — SIGNIFICANT CHANGE UP (ref 0.2–1.2)
BUN SERPL-MCNC: 18 MG/DL — SIGNIFICANT CHANGE UP (ref 7–23)
CALCIUM SERPL-MCNC: 7.6 MG/DL — LOW (ref 8.4–10.5)
CHLORIDE SERPL-SCNC: 101 MMOL/L — SIGNIFICANT CHANGE UP (ref 96–108)
CO2 SERPL-SCNC: 22 MMOL/L — SIGNIFICANT CHANGE UP (ref 22–31)
CREAT SERPL-MCNC: 1.04 MG/DL — SIGNIFICANT CHANGE UP (ref 0.5–1.3)
CULTURE RESULTS: SIGNIFICANT CHANGE UP
CULTURE RESULTS: SIGNIFICANT CHANGE UP
EGFR: 68 ML/MIN/1.73M2 — SIGNIFICANT CHANGE UP
EOSINOPHIL # BLD AUTO: 0.04 K/UL — SIGNIFICANT CHANGE UP (ref 0–0.5)
EOSINOPHIL NFR BLD AUTO: 0.4 % — SIGNIFICANT CHANGE UP (ref 0–6)
GLUCOSE BLDC GLUCOMTR-MCNC: 203 MG/DL — HIGH (ref 70–99)
GLUCOSE BLDC GLUCOMTR-MCNC: 225 MG/DL — HIGH (ref 70–99)
GLUCOSE BLDC GLUCOMTR-MCNC: 239 MG/DL — HIGH (ref 70–99)
GLUCOSE BLDC GLUCOMTR-MCNC: 261 MG/DL — HIGH (ref 70–99)
GLUCOSE BLDC GLUCOMTR-MCNC: 333 MG/DL — HIGH (ref 70–99)
GLUCOSE BLDC GLUCOMTR-MCNC: 396 MG/DL — HIGH (ref 70–99)
GLUCOSE SERPL-MCNC: 305 MG/DL — HIGH (ref 70–99)
HCT VFR BLD CALC: 34.6 % — SIGNIFICANT CHANGE UP (ref 34.5–45)
HGB BLD-MCNC: 10.6 G/DL — LOW (ref 11.5–15.5)
IMM GRANULOCYTES NFR BLD AUTO: 0.9 % — SIGNIFICANT CHANGE UP (ref 0–0.9)
INR BLD: 0.97 — SIGNIFICANT CHANGE UP (ref 0.88–1.16)
LYMPHOCYTES # BLD AUTO: 1.24 K/UL — SIGNIFICANT CHANGE UP (ref 1–3.3)
LYMPHOCYTES # BLD AUTO: 13.6 % — SIGNIFICANT CHANGE UP (ref 13–44)
MCHC RBC-ENTMCNC: 24.1 PG — LOW (ref 27–34)
MCHC RBC-ENTMCNC: 30.6 GM/DL — LOW (ref 32–36)
MCV RBC AUTO: 78.6 FL — LOW (ref 80–100)
MONOCYTES # BLD AUTO: 0.19 K/UL — SIGNIFICANT CHANGE UP (ref 0–0.9)
MONOCYTES NFR BLD AUTO: 2.1 % — SIGNIFICANT CHANGE UP (ref 2–14)
NEUTROPHILS # BLD AUTO: 7.54 K/UL — HIGH (ref 1.8–7.4)
NEUTROPHILS NFR BLD AUTO: 82.9 % — HIGH (ref 43–77)
NRBC # BLD: 0 /100 WBCS — SIGNIFICANT CHANGE UP (ref 0–0)
PLATELET # BLD AUTO: 354 K/UL — SIGNIFICANT CHANGE UP (ref 150–400)
POTASSIUM SERPL-MCNC: 4.4 MMOL/L — SIGNIFICANT CHANGE UP (ref 3.5–5.3)
POTASSIUM SERPL-SCNC: 4.4 MMOL/L — SIGNIFICANT CHANGE UP (ref 3.5–5.3)
PROT SERPL-MCNC: 7.3 G/DL — SIGNIFICANT CHANGE UP (ref 6–8.3)
PROTHROM AB SERPL-ACNC: 11.5 SEC — SIGNIFICANT CHANGE UP (ref 10.5–13.4)
RBC # BLD: 4.4 M/UL — SIGNIFICANT CHANGE UP (ref 3.8–5.2)
RBC # FLD: 15.6 % — HIGH (ref 10.3–14.5)
SODIUM SERPL-SCNC: 135 MMOL/L — SIGNIFICANT CHANGE UP (ref 135–145)
SPECIMEN SOURCE: SIGNIFICANT CHANGE UP
SPECIMEN SOURCE: SIGNIFICANT CHANGE UP
WBC # BLD: 9.1 K/UL — SIGNIFICANT CHANGE UP (ref 3.8–10.5)
WBC # FLD AUTO: 9.1 K/UL — SIGNIFICANT CHANGE UP (ref 3.8–10.5)

## 2023-06-26 PROCEDURE — 92929: CPT | Mod: RC

## 2023-06-26 PROCEDURE — 93458 L HRT ARTERY/VENTRICLE ANGIO: CPT | Mod: 26,59

## 2023-06-26 PROCEDURE — 92928 PRQ TCAT PLMT NTRAC ST 1 LES: CPT | Mod: RC

## 2023-06-26 PROCEDURE — 99152 MOD SED SAME PHYS/QHP 5/>YRS: CPT

## 2023-06-26 PROCEDURE — 99232 SBSQ HOSP IP/OBS MODERATE 35: CPT

## 2023-06-26 PROCEDURE — 99233 SBSQ HOSP IP/OBS HIGH 50: CPT

## 2023-06-26 RX ORDER — SODIUM CHLORIDE 9 MG/ML
250 INJECTION INTRAMUSCULAR; INTRAVENOUS; SUBCUTANEOUS ONCE
Refills: 0 | Status: COMPLETED | OUTPATIENT
Start: 2023-06-26 | End: 2023-06-26

## 2023-06-26 RX ORDER — MAGNESIUM SULFATE 500 MG/ML
2 VIAL (ML) INJECTION ONCE
Refills: 0 | Status: COMPLETED | OUTPATIENT
Start: 2023-06-26 | End: 2023-06-26

## 2023-06-26 RX ORDER — APIXABAN 2.5 MG/1
5 TABLET, FILM COATED ORAL EVERY 12 HOURS
Refills: 0 | Status: DISCONTINUED | OUTPATIENT
Start: 2023-06-26 | End: 2023-06-29

## 2023-06-26 RX ORDER — CLOPIDOGREL BISULFATE 75 MG/1
75 TABLET, FILM COATED ORAL DAILY
Refills: 0 | Status: DISCONTINUED | OUTPATIENT
Start: 2023-06-27 | End: 2023-06-29

## 2023-06-26 RX ORDER — INSULIN GLARGINE 100 [IU]/ML
50 INJECTION, SOLUTION SUBCUTANEOUS
Refills: 0 | Status: DISCONTINUED | OUTPATIENT
Start: 2023-06-26 | End: 2023-06-29

## 2023-06-26 RX ORDER — SODIUM CHLORIDE 9 MG/ML
500 INJECTION INTRAMUSCULAR; INTRAVENOUS; SUBCUTANEOUS
Refills: 0 | Status: DISCONTINUED | OUTPATIENT
Start: 2023-06-26 | End: 2023-06-26

## 2023-06-26 RX ORDER — HYDROMORPHONE HYDROCHLORIDE 2 MG/ML
0.5 INJECTION INTRAMUSCULAR; INTRAVENOUS; SUBCUTANEOUS ONCE
Refills: 0 | Status: DISCONTINUED | OUTPATIENT
Start: 2023-06-26 | End: 2023-06-26

## 2023-06-26 RX ORDER — OXYCODONE HYDROCHLORIDE 5 MG/1
10 TABLET ORAL
Refills: 0 | Status: DISCONTINUED | OUTPATIENT
Start: 2023-06-26 | End: 2023-06-29

## 2023-06-26 RX ORDER — SODIUM CHLORIDE 9 MG/ML
1000 INJECTION INTRAMUSCULAR; INTRAVENOUS; SUBCUTANEOUS
Refills: 0 | Status: DISCONTINUED | OUTPATIENT
Start: 2023-06-26 | End: 2023-06-29

## 2023-06-26 RX ADMIN — HYDROMORPHONE HYDROCHLORIDE 0.5 MILLIGRAM(S): 2 INJECTION INTRAMUSCULAR; INTRAVENOUS; SUBCUTANEOUS at 09:40

## 2023-06-26 RX ADMIN — GABAPENTIN 600 MILLIGRAM(S): 400 CAPSULE ORAL at 22:08

## 2023-06-26 RX ADMIN — APIXABAN 5 MILLIGRAM(S): 2.5 TABLET, FILM COATED ORAL at 23:52

## 2023-06-26 RX ADMIN — GABAPENTIN 600 MILLIGRAM(S): 400 CAPSULE ORAL at 16:19

## 2023-06-26 RX ADMIN — Medication 50 MILLIGRAM(S): at 08:44

## 2023-06-26 RX ADMIN — ATORVASTATIN CALCIUM 20 MILLIGRAM(S): 80 TABLET, FILM COATED ORAL at 22:08

## 2023-06-26 RX ADMIN — GABAPENTIN 600 MILLIGRAM(S): 400 CAPSULE ORAL at 08:43

## 2023-06-26 RX ADMIN — Medication 4: at 11:57

## 2023-06-26 RX ADMIN — HEPARIN SODIUM 1800 UNIT(S)/HR: 5000 INJECTION INTRAVENOUS; SUBCUTANEOUS at 08:39

## 2023-06-26 RX ADMIN — HEPARIN SODIUM 1800 UNIT(S)/HR: 5000 INJECTION INTRAVENOUS; SUBCUTANEOUS at 06:32

## 2023-06-26 RX ADMIN — Medication 50 MILLIGRAM(S): at 19:45

## 2023-06-26 RX ADMIN — HYDROMORPHONE HYDROCHLORIDE 0.5 MILLIGRAM(S): 2 INJECTION INTRAMUSCULAR; INTRAVENOUS; SUBCUTANEOUS at 10:15

## 2023-06-26 RX ADMIN — Medication 4: at 09:21

## 2023-06-26 RX ADMIN — INSULIN GLARGINE 40 UNIT(S): 100 INJECTION, SOLUTION SUBCUTANEOUS at 09:29

## 2023-06-26 RX ADMIN — Medication 200 MILLIGRAM(S): at 13:59

## 2023-06-26 RX ADMIN — SODIUM CHLORIDE 250 MILLILITER(S): 9 INJECTION INTRAMUSCULAR; INTRAVENOUS; SUBCUTANEOUS at 09:22

## 2023-06-26 RX ADMIN — Medication 8: at 22:09

## 2023-06-26 RX ADMIN — Medication 50 MILLIGRAM(S): at 17:12

## 2023-06-26 RX ADMIN — Medication 5 MILLIGRAM(S): at 08:43

## 2023-06-26 RX ADMIN — Medication 81 MILLIGRAM(S): at 11:57

## 2023-06-26 RX ADMIN — OXYCODONE HYDROCHLORIDE 10 MILLIGRAM(S): 5 TABLET ORAL at 22:08

## 2023-06-26 RX ADMIN — SODIUM CHLORIDE 250 MILLILITER(S): 9 INJECTION INTRAMUSCULAR; INTRAVENOUS; SUBCUTANEOUS at 20:48

## 2023-06-26 RX ADMIN — Medication 25 GRAM(S): at 20:48

## 2023-06-26 RX ADMIN — Medication 5 MILLIGRAM(S): at 22:08

## 2023-06-26 RX ADMIN — Medication 1 TABLET(S): at 11:57

## 2023-06-26 RX ADMIN — INSULIN GLARGINE 50 UNIT(S): 100 INJECTION, SOLUTION SUBCUTANEOUS at 23:52

## 2023-06-26 RX ADMIN — Medication 5 MILLIGRAM(S): at 16:17

## 2023-06-26 RX ADMIN — CLOPIDOGREL BISULFATE 600 MILLIGRAM(S): 75 TABLET, FILM COATED ORAL at 06:32

## 2023-06-26 RX ADMIN — SODIUM CHLORIDE 75 MILLILITER(S): 9 INJECTION INTRAMUSCULAR; INTRAVENOUS; SUBCUTANEOUS at 09:40

## 2023-06-26 NOTE — DISCHARGE NOTE PROVIDER - NSDCFUADDINST_GEN_ALL_CORE_FT
We have provided you with a prescription for cardiac rehab which is medically supervised exercise program for your heart and has been shown to improve the quantity and quality of life of people with heart disease like yours. You should attend cardiac rehab 3 times per week for 12 weeks. We have provided you with a list of nearby facilities. Please call your insurance carrier to determine which of these facilities are covered under your plan.

## 2023-06-26 NOTE — DISCHARGE NOTE PROVIDER - NSDCFUSCHEDAPPT_GEN_ALL_CORE_FT
Kimo Garcia  [facility] Physician [facility]  [facility] [address]  Scheduled Appointment: 08/01/2023

## 2023-06-26 NOTE — PROGRESS NOTE ADULT - ASSESSMENT
43F w/ contrast allergy and PMHx of HTN, HLD, poorly controlled DM-II, CVA (11/2021, residual LE weakness L>R), PE 4/2023 (on Eliquis), Asthma, abd necrotizing fasciitis s/p suprapubic catheter and ostomy in place (11/2021, requiring intubation at that time), and frequent UTIs 2/2 suprapubic catheter w/ recent hospitalization for Urosepsis 5/23-6/1 (Klebsiella and EColi, was discharged w/ L Midline for IV abx), returns to [facility] ED 6/20 c/o abd pain, nausea, CP and SOB, found to be hyperglycemic w/ -600s, hypokalemic and elevated troponin, pt now admitted to cardiac tele for r/o ACS. Pt found to have abnormal CCTA, pending cardiac cath 6/26. Medicine and ID consulted for SIRS, currently off abx and stable. Endo following for poorly controlled DM.

## 2023-06-26 NOTE — DISCHARGE NOTE PROVIDER - CARE PROVIDER_API CALL
Valeri Carver  Interventional Cardiology  [address], [address] [place] [address]  Phone: [phone]  Fax: [phone]  Follow Up Time: 1 week    Lex Montaño  Pain Medicine  [address], [address]  [place] [address]  Phone: [phone]  Fax: [phone]  Follow Up Time: 1 week    Pelon Sánchez  Internal Medicine  [facility], [address] [place] [address]  Phone: [phone]  Fax: [phone]  Follow Up Time: 1 week   Valeri Carver  Interventional Cardiology  [address], [place] [address]  Phone: [phone]  Fax: [phone]  Follow Up Time: 1 week    Lex Montaño  Pain Medicine  [address], [address]  [place] [address]  Phone: [phone]  Fax: [phone]  Follow Up Time: 1 week    Pelon Sánchez  Internal Medicine  [facility], [address] [place] [address]  Phone: [phone]  Fax: [phone]  Follow Up Time: 1 week    Lorene Ramon MD  Cardiovascular Disease, Internal Medicine, Cardiology  LOCATION  [facility]/[address] [address]  Phone: [phone]  Phone: [phone]  Fax: (   )    -  Follow Up Time: 1 week   Valeri Carver  Interventional Cardiology  [address], [place] [address]  Phone: [phone]  Fax: [phone]  Follow Up Time: 1 week    Lex Montaño  Pain Medicine  [address], [address]  [place] [address]  Phone: [phone]  Fax: [phone]  Follow Up Time: 1 week    Pelon Sánchez  Internal Medicine  [facility], [address] [place] [address]  Phone: [phone]  Fax: [phone]  Follow Up Time: 1 week    Kimo Garcia  Cardiovascular Disease  [address] [place] [address]  Phone: [phone]  Fax: [phone]  Scheduled Appointment: 08/01/2023    Lorene Ramon MD  Cardiovascular Disease, Internal Medicine, Cardiology  LOCATION  [facility] [place]  [address] [place] [address]  Phone: [phone]  Phone: [phone]  Fax: (   )    -  Follow Up Time: 1 week

## 2023-06-26 NOTE — DISCHARGE NOTE PROVIDER - NSDCMRMEDTOKEN_GEN_ALL_CORE_FT
acetaminophen 325 mg oral tablet: 3 tab(s) orally every 8 hours  Admelog 100 units/mL injectable solution: 45 unit(s) injectable 3 times a day (with meals)  apixaban 5 mg oral tablet: 1 tab(s) orally every 12 hours  Dilaudid 2 mg oral tablet: 0.5 tab(s) orally 3 times a day  diphenhydrAMINE 50 mg oral tablet: 1 tab(s) orally once a day  gabapentin 600 mg oral tablet: 1 tab(s) orally 3 times a day  HumuLIN R (Concentrated) 500 units/mL subcutaneous solution: 80 unit(s) subcutaneous 3 times a day  ibuprofen 800 mg oral tablet: 1 tab(s) orally 3 times a day  losartan 50 mg oral tablet: 1 tab(s) orally 2 times a day Pt reports she takes Losartan BID  oxyBUTYnin 5 mg oral tablet: 1 tab(s) orally every 8 hours as needed for Bladder spasms Take 1 tablet every 8 hours  oxyCODONE 10 mg oral tablet: 1 tab(s) orally 2 times a day  Ventolin HFA 90 mcg/inh inhalation aerosol: 1 puff(s) inhaled as needed.    acetaminophen 325 mg oral tablet: 3 tab(s) orally every 8 hours  Admelog 100 units/mL injectable solution: 30 unit(s) injectable 3 times a day (with meals)  apixaban 5 mg oral tablet: 1 tab(s) orally every 12 hours  atorvastatin 40 mg oral tablet: 1 tab(s) orally once a day (at bedtime)  clopidogrel 75 mg oral tablet: 1 tab(s) orally once a day  gabapentin 600 mg oral tablet: 1 tab(s) orally 3 times a day  HumuLIN R (Concentrated) 500 units/mL subcutaneous solution: 50 unit(s) subcutaneous 3 times a day  ibuprofen 800 mg oral tablet: 1 tab(s) orally 3 times a day  lactobacillus acidophilus oral capsule: 1 cap(s) orally once a day  metoprolol succinate 50 mg oral tablet, extended release: 1 tab(s) orally once a day  oxyBUTYnin 5 mg oral tablet: 1 tab(s) orally every 8 hours as needed for Bladder spasms Take 1 tablet every 8 hours  oxyCODONE 10 mg oral tablet: 1 tab(s) orally 2 times a day  Ventolin HFA 90 mcg/inh inhalation aerosol: 1 puff(s) inhaled as needed.    acetaminophen 325 mg oral tablet: 3 tab(s) orally every 8 hours  Admelog 100 units/mL injectable solution: 30 unit(s) injectable 3 times a day (with meals)  apixaban 5 mg oral tablet: 1 tab(s) orally every 12 hours  atorvastatin 40 mg oral tablet: 1 tab(s) orally once a day (at bedtime)  clopidogrel 75 mg oral tablet: 1 tab(s) orally once a day  gabapentin 600 mg oral tablet: 1 tab(s) orally 3 times a day  HumuLIN R (Concentrated) 500 units/mL subcutaneous solution: 50 unit(s) subcutaneous 3 times a day  ibuprofen 800 mg oral tablet: 1 tab(s) orally 3 times a day  lactobacillus acidophilus oral capsule: 1 cap(s) orally once a day  metoprolol succinate 50 mg oral tablet, extended release: 1 tab(s) orally once a day  oxyBUTYnin 5 mg oral tablet: 1 tab(s) orally every 8 hours as needed for Bladder spasms Take 1 tablet every 8 hours  oxyCODONE 10 mg oral tablet: 1 tab(s) orally 2 times a day  oxyCODONE 5 mg oral tablet: 1 tab(s) orally every 6 hours as needed for  severe pain MDD: 4 tabs  Ventolin HFA 90 mcg/inh inhalation aerosol: 1 puff(s) inhaled as needed.    acetaminophen 325 mg oral tablet: 3 tab(s) orally every 8 hours  Admelog 100 units/mL injectable solution: 30 unit(s) injectable 3 times a day (with meals)  apixaban 5 mg oral tablet: 1 tab(s) orally every 12 hours  atorvastatin 40 mg oral tablet: 1 tab(s) orally once a day (at bedtime)  Cardiac Rehab: Cardiac Rehab: 3x/week for a total of 12 weeks. Diagnosis: CAD s/p PCI placement.  clopidogrel 75 mg oral tablet: 1 tab(s) orally once a day  gabapentin 600 mg oral tablet: 1 tab(s) orally 3 times a day  HumuLIN R (Concentrated) 500 units/mL subcutaneous solution: 50 unit(s) subcutaneous 3 times a day  ibuprofen 800 mg oral tablet: 1 tab(s) orally 3 times a day  lactobacillus acidophilus oral capsule: 1 cap(s) orally once a day  metoprolol succinate 50 mg oral tablet, extended release: 1 tab(s) orally once a day  oxyBUTYnin 5 mg oral tablet: 1 tab(s) orally every 8 hours as needed for Bladder spasms Take 1 tablet every 8 hours  oxyCODONE 5 mg oral tablet: 1 tab(s) orally every 6 hours as needed for  severe pain MDD: 4 tabs  Ventolin HFA 90 mcg/inh inhalation aerosol: 1 puff(s) inhaled as needed.

## 2023-06-26 NOTE — DISCHARGE NOTE PROVIDER - NSDCCPCAREPLAN_GEN_ALL_CORE_FT
PRINCIPAL DISCHARGE DIAGNOSIS  Diagnosis: CAD (coronary artery disease)  Assessment and Plan of Treatment: You underwent a coronary angiogram. You recieved one stent to the proximal RCA artery and one stent to the RPLS artery. You have residual artery disease but should only return if experiencing symptoms of chest pain.  Consult your doctor before returning to vigorous activity. You may return to work in 3-5 days.   You may shower, but avoid baths, hot tubs, or swimming for 5 days to prevent infection. If you notice bleeding from the site, hardening and pain at the site, drainage or redness from the site, coolness/paleness of the extremity, swelling, or fever, please call [phone].   Please continue the following medicatoins unless otherwise indicated by your cardiologist:   - Eliquis 5 mg BID  - Plavix 75 mg once daily  If you experience any symptoms including but not limited to chest pain, shortness of breath, or dizziness, please call your doctor and seek immediate medical attention. Please follow up with  ___ at your scheduled appointment in 1-2 weeks. No medication changes have been made at this time.       PRINCIPAL DISCHARGE DIAGNOSIS  Diagnosis: CAD (coronary artery disease)  Assessment and Plan of Treatment: You underwent a coronary angiogram. You recieved one stent to the proximal RCA artery and one stent to the RPLS artery. You have residual artery disease but should only return if experiencing symptoms of chest pain.  Consult your doctor before returning to vigorous activity. You may return to work in 3-5 days.   You may shower, but avoid baths, hot tubs, or swimming for 5 days to prevent infection. If you notice bleeding from the site, hardening and pain at the site, drainage or redness from the site, coolness/paleness of the extremity, swelling, or fever, please call [phone].   Please continue the following medicatoins unless otherwise indicated by your cardiologist:   - Eliquis 5 mg BID  - Plavix 75 mg once daily  If you experience any symptoms including but not limited to chest pain, shortness of breath, or dizziness, please call your doctor and seek immediate medical attention. Please follow up with  ___ at your scheduled appointment in 1-2 weeks. No medication changes have been made at this time.        SECONDARY DISCHARGE DIAGNOSES  Diagnosis: Uncontrolled type 2 diabetes mellitus with hyperglycemia  Assessment and Plan of Treatment: - Maintain a low Carbohydrate diet. Check your blood sugar regularly. For blood sugar that is too high or too low please call your Doctor or go to the Emergency Room as necessary. Your goal Hemoglobin A1c is less than 7.0%, This number measures your average blood sugar level over the last three months. Ways to lower this number include: diet, exercise, monitoring your fingersticks, adhering to your medication regimen and regular follow up during you Endocrinologist/Primary Care Doctor.       PRINCIPAL DISCHARGE DIAGNOSIS  Diagnosis: CAD (coronary artery disease)  Assessment and Plan of Treatment: You underwent a coronary angiogram. You recieved one stent to the proximal RCA artery and one stent to the RPLS artery. You have residual artery disease but should only return if experiencing symptoms of chest pain.  Consult your doctor before returning to vigorous activity. You may return to work in 3-5 days.   You may shower, but avoid baths, hot tubs, or swimming for 5 days to prevent infection. If you notice bleeding from the site, hardening and pain at the site, drainage or redness from the site, coolness/paleness of the extremity, swelling, or fever, please call [phone].   Please continue the following medicatoins unless otherwise indicated by your cardiologist:   - Eliquis 5 mg BID  - Plavix 75 mg once daily  If you experience any symptoms including but not limited to chest pain, shortness of breath, or dizziness, please call your doctor and seek immediate medical attention.   Please follow up with  ___ at your scheduled appointment in 1-2 weeks. No medication changes have been made at this time.        SECONDARY DISCHARGE DIAGNOSES  Diagnosis: Uncontrolled type 2 diabetes mellitus with hyperglycemia  Assessment and Plan of Treatment: - Maintain a low Carbohydrate diet. Check your blood sugar regularly. For blood sugar that is too high or too low please call your Doctor or go to the Emergency Room as necessary. Your goal Hemoglobin A1c is less than 7.0%, This number measures your average blood sugar level over the last three months. Ways to lower this number include: diet, exercise, monitoring your fingersticks, adhering to your medication regimen and regular follow up during you Endocrinologist/Primary Care Doctor.  You should continue taking:  -U500 50 units three times a day   -Admelog 30 units three times a day with meals      Diagnosis: Chronic back pain  Assessment and Plan of Treatment: You should follow up with an outpatient pain management specialist to manage your opioid medications. You have been prescribed Oxycodone 10 mg to be taken every 6 hours as needed for pain for a total of 5 days.  Please make an appointment with ______    Diagnosis: Essential hypertension  Assessment and Plan of Treatment: Please continue medications as listed to keep your blood pressure controlled. For blood pressure that is too high or too low please see your doctor or go to the emergency room as necessary.    Diagnosis: Hyperlipidemia  Assessment and Plan of Treatment: Please continue Lipitor 40 mg daily to keep your cholesterol low. High cholesterol contributes to heart disease.     PRINCIPAL DISCHARGE DIAGNOSIS  Diagnosis: CAD (coronary artery disease)  Assessment and Plan of Treatment: You underwent a coronary angiogram. You recieved one stent to the proximal RCA artery and one stent to the RPLS artery. You have residual artery disease but should only return if experiencing symptoms of chest pain.  Consult your doctor before returning to vigorous activity. You may return to work in 3-5 days.   You may shower, but avoid baths, hot tubs, or swimming for 5 days to prevent infection. If you notice bleeding from the site, hardening and pain at the site, drainage or redness from the site, coolness/paleness of the extremity, swelling, or fever, please call [phone].   Please continue the following medicatoins unless otherwise indicated by your cardiologist:   - Eliquis 5 mg BID  - Plavix 75 mg once daily  If you experience any symptoms including but not limited to chest pain, shortness of breath, or dizziness, please call your doctor and seek immediate medical attention.   Please follow up with Dr. Carver within 7-10 days. No medication changes have been made at this time.        SECONDARY DISCHARGE DIAGNOSES  Diagnosis: Uncontrolled type 2 diabetes mellitus with hyperglycemia  Assessment and Plan of Treatment: - Maintain a low Carbohydrate diet. Check your blood sugar regularly. For blood sugar that is too high or too low please call your Doctor or go to the Emergency Room as necessary. Your goal Hemoglobin A1c is less than 7.0%, This number measures your average blood sugar level over the last three months. Ways to lower this number include: diet, exercise, monitoring your fingersticks, adhering to your medication regimen and regular follow up during you Endocrinologist/Primary Care Doctor.  You should continue taking:  -U500 50 units three times a day   -Admelog 30 units three times a day with meals      Diagnosis: Chronic back pain  Assessment and Plan of Treatment: You should follow up with an outpatient pain management specialist to manage your opioid medications. You have been prescribed Oxycodone 10 mg to be taken every 6 hours as needed for pain for a total of 5 days.  Please make an appointment with pain management specialist Dr. Montaño within 1 week.    Diagnosis: Essential hypertension  Assessment and Plan of Treatment: Please continue medications as listed to keep your blood pressure controlled. For blood pressure that is too high or too low please see your doctor or go to the emergency room as necessary.    Diagnosis: Hyperlipidemia  Assessment and Plan of Treatment: Please continue Lipitor 40 mg daily to keep your cholesterol low. High cholesterol contributes to heart disease.     PRINCIPAL DISCHARGE DIAGNOSIS  Diagnosis: CAD (coronary artery disease)  Assessment and Plan of Treatment: You underwent a coronary angiogram. You recieved one stent to the proximal RCA artery and one stent to the RPLS artery. You have residual artery disease but should only return if experiencing symptoms of chest pain.  Consult your doctor before returning to vigorous activity. You may return to work in 3-5 days.   You may shower, but avoid baths, hot tubs, or swimming for 5 days to prevent infection. If you notice bleeding from the site, hardening and pain at the site, drainage or redness from the site, coolness/paleness of the extremity, swelling, or fever, please call [phone].   Please continue the following medicatoins unless otherwise indicated by your cardiologist:   - Eliquis 5 mg BID  - Plavix 75 mg once daily  If you experience any symptoms including but not limited to chest pain, shortness of breath, or dizziness, please call your doctor and seek immediate medical attention.   Please follow up with Dr. Ramon at [facility] within 7-10 days [phone]. In order to see the cardiologist you will need a referal from your Primary Care Physician and then contact Referral Office at [phone].  No medication changes have been made at this time.        SECONDARY DISCHARGE DIAGNOSES  Diagnosis: Uncontrolled type 2 diabetes mellitus with hyperglycemia  Assessment and Plan of Treatment: - Maintain a low Carbohydrate diet. Check your blood sugar regularly. For blood sugar that is too high or too low please call your Doctor or go to the Emergency Room as necessary. Your goal Hemoglobin A1c is less than 7.0%, This number measures your average blood sugar level over the last three months. Ways to lower this number include: diet, exercise, monitoring your fingersticks, adhering to your medication regimen and regular follow up during you Endocrinologist/Primary Care Doctor.  You should continue taking:  -U500 50 units three times a day   -Admelog 30 units three times a day with meals      Diagnosis: Chronic back pain  Assessment and Plan of Treatment: You should follow up with an outpatient pain management specialist to manage your opioid medications. You have been prescribed Oxycodone 10 mg to be taken every 6 hours as needed for pain for a total of 5 days.  Please make an appointment with pain management specialist Dr. Montaño within 1 week.    Diagnosis: Essential hypertension  Assessment and Plan of Treatment: Please continue medications as listed to keep your blood pressure controlled. For blood pressure that is too high or too low please see your doctor or go to the emergency room as necessary.    Diagnosis: Hyperlipidemia  Assessment and Plan of Treatment: Please continue Lipitor 40 mg daily to keep your cholesterol low. High cholesterol contributes to heart disease.     PRINCIPAL DISCHARGE DIAGNOSIS  Diagnosis: CAD (coronary artery disease)  Assessment and Plan of Treatment: You underwent a coronary angiogram. You recieved one stent to the proximal RCA artery and one stent to the RPLS artery. You have residual artery disease but should only return if experiencing symptoms of chest pain.  Consult your doctor before returning to vigorous activity. You may return to work in 3-5 days.   You may shower, but avoid baths, hot tubs, or swimming for 5 days to prevent infection. If you notice bleeding from the site, hardening and pain at the site, drainage or redness from the site, coolness/paleness of the extremity, swelling, or fever, please call [phone].   Please continue the following medicatoins unless otherwise indicated by your cardiologist:   - Eliquis 5 mg BID  - Plavix 75 mg once daily  If you experience any symptoms including but not limited to chest pain, shortness of breath, or dizziness, please call your doctor and seek immediate medical attention.   Please follow up with Dr. Ramon at [facility] within 7-10 days [phone]. In order to see the cardiologist you will need a referal from your Primary Care Physician and then contact Referral Office at [phone].  No medication changes have been made at this time.        SECONDARY DISCHARGE DIAGNOSES  Diagnosis: Uncontrolled type 2 diabetes mellitus with hyperglycemia  Assessment and Plan of Treatment: - Maintain a low Carbohydrate diet. Check your blood sugar regularly. For blood sugar that is too high or too low please call your Doctor or go to the Emergency Room as necessary. Your goal Hemoglobin A1c is less than 7.0%, This number measures your average blood sugar level over the last three months. Ways to lower this number include: diet, exercise, monitoring your fingersticks, adhering to your medication regimen and regular follow up during you Endocrinologist/Primary Care Doctor.  You should continue taking:  -U500 50 units three times a day   -Admelog 30 units three times a day with meals      Diagnosis: Chronic back pain  Assessment and Plan of Treatment: You should follow up with an outpatient pain management specialist to manage your opioid medications. You have been prescribed Oxycodone 5 mg to be taken every 6 hours as needed for pain for a total of 5 days.  Please make an appointment with pain management specialist Dr. Montaño within 1 week.    Diagnosis: Essential hypertension  Assessment and Plan of Treatment: Please continue medications as listed to keep your blood pressure controlled. For blood pressure that is too high or too low please see your doctor or go to the emergency room as necessary.    Diagnosis: Hyperlipidemia  Assessment and Plan of Treatment: Please continue Lipitor 40 mg daily to keep your cholesterol low. High cholesterol contributes to heart disease.

## 2023-06-26 NOTE — PROGRESS NOTE ADULT - PROBLEM SELECTOR PLAN 9
"[employer] message sent.  Berenice Booker RN     Prior admission 5/22-6/1 for UTI of suprapubic catheter,  ESBL Klebsiella  -UA/UCxs as above, per ID will monitor off abx. If pt becomes febrile can trial Tigecycline 100mg IV x 1 > 50mg BID (12hr after loading dose)  -CT abd findings as above  -ID recommending removal of suprapubic catheter, however pt currently refusing due to immobility  -Continue Oxybutinin 5mg TID Prior admission 5/22-6/1 for UTI of suprapubic catheter,  ESBL Klebsiella  -UA/UCxs as above, per ID will monitor off abx. If pt becomes febrile can trial Tigecycline 100mg IV x 1 > 50mg BID (12hr after loading dose)  -CT abd findings as above  -ID recommending removal of suprapubic catheter, however pt currently refusing due to immobility  -Continue Oxybutinin 5mg TID  - Patient requiring bedside commode on discharge

## 2023-06-26 NOTE — PROGRESS NOTE ADULT - SUBJECTIVE AND OBJECTIVE BOX
SUBJECTIVE/OVERNIGHT EVENTS: No acute overnight events. Pt seen in AM at bedside, resting comfortably in bed, and does not appear to be in any acute distress. When asked, pt denies any recent or active fever, chills, nausea, vomiting, headache, acute sob, chest pain, genitourinary sx, extremity pain or swelling.    VITAL SIGNS:  Vital Signs Last 24 Hrs  T(C): 36.3 (26 Jun 2023 13:19), Max: 37.2 (25 Jun 2023 18:06)  T(F): 97.4 (26 Jun 2023 13:19), Max: 98.9 (25 Jun 2023 18:06)  HR: 86 (26 Jun 2023 12:00) (76 - 100)  BP: 114/57 (26 Jun 2023 12:00) (108/58 - 150/65)  BP(mean): 82 (26 Jun 2023 12:00) (76 - 98)  RR: 18 (26 Jun 2023 12:00) (15 - 18)  SpO2: 97% (26 Jun 2023 12:00) (94% - 97%)    Parameters below as of 26 Jun 2023 12:00  Patient On (Oxygen Delivery Method): room air        PHYSICAL EXAM:  General: NAD, resting in bed  HEENT: MMM  Neck: supple  Cardiovascular: +S1/S2; RRR  Respiratory: CTA B/L; no W/R/R  Gastrointestinal: soft, ND, +BS, ostomy bag in place, clean well-appearing surrounding skin, no output in bag, clean dry umbilicus, tenderness to palpation but out of proportion with exam and patient appears comfortable despite exclamation.   Back: tenderness to palpation of R paraspinal region of midback  Extremities: WWP; no edema, clubbing or cyanosis; diffusely tender/sensitive to palpation  Vascular: 2+ radial, DP/PT pulses B/L  Neuro: AOx3    MEDICATIONS:  MEDICATIONS  (STANDING):  aspirin enteric coated 81 milliGRAM(s) Oral daily  atorvastatin 20 milliGRAM(s) Oral at bedtime  dextrose 5%. 1000 milliLiter(s) (50 mL/Hr) IV Continuous <Continuous>  dextrose 5%. 1000 milliLiter(s) (100 mL/Hr) IV Continuous <Continuous>  dextrose 50% Injectable 12.5 Gram(s) IV Push once  dextrose 50% Injectable 25 Gram(s) IV Push once  dextrose 50% Injectable 25 Gram(s) IV Push once  diphenhydrAMINE Injectable 50 milliGRAM(s) IV Push once  diphenhydrAMINE Injectable 50 milliGRAM(s) IV Push once  gabapentin 600 milliGRAM(s) Oral three times a day  glucagon  Injectable 1 milliGRAM(s) IntraMuscular once  heparin  Infusion.  Unit(s)/Hr (18 mL/Hr) IV Continuous <Continuous>  hydrocortisone sodium succinate Injectable 200 milliGRAM(s) IV Push once  insulin glargine Injectable (LANTUS) 40 Unit(s) SubCutaneous <User Schedule>  insulin lispro (ADMELOG) corrective regimen sliding scale   SubCutaneous Before meals and at bedtime  insulin lispro Injectable (ADMELOG) 30 Unit(s) SubCutaneous three times a day before meals  lactobacillus acidophilus 1 Tablet(s) Oral daily  metoprolol succinate ER 50 milliGRAM(s) Oral daily  oxybutynin 5 milliGRAM(s) Oral three times a day  sodium chloride 0.9%. 500 milliLiter(s) (75 mL/Hr) IV Continuous <Continuous>    MEDICATIONS  (PRN):  acetaminophen     Tablet .. 650 milliGRAM(s) Oral every 8 hours PRN Mild Pain (1 - 3)  albuterol    90 MICROgram(s) HFA Inhaler 2 Puff(s) Inhalation every 6 hours PRN Shortness of Breath and/or Wheezing  dextrose Oral Gel 15 Gram(s) Oral once PRN Blood Glucose LESS THAN 70 milliGRAM(s)/deciliter  oxyCODONE    IR 5 milliGRAM(s) Oral every 4 hours PRN Moderate Pain (4 - 6)      ALLERGIES:  Allergies    shellfish. (Hives; Anaphylaxis)  iodine containing compounds (Hives; Anaphylaxis)  fish (Hives; Urticaria)  clindamycin (Pruritus)  vancomycin (Anaphylaxis; Hives)  amoxicillin (Short breath; Rash)  penicillin (Hives)    Intolerances    Bactrim I.V. (Rash (Mod to Severe))      LABS:                        10.5   8.72  )-----------( 409      ( 25 Jun 2023 05:30 )             35.4     06-25    139  |  102  |  26<H>  ----------------------------<  262<H>  3.8   |  24  |  1.30    Ca    8.0<L>      25 Jun 2023 05:30  Phos  2.9     06-25  Mg     1.5     06-25    TPro  7.0  /  Alb  3.2<L>  /  TBili  <0.2  /  DBili  x   /  AST  14  /  ALT  11  /  AlkPhos  140<H>  06-25    PTT - ( 25 Jun 2023 05:30 )  PTT:70.2 sec    RADIOLOGY & ADDITIONAL TESTS: Reviewed.

## 2023-06-26 NOTE — DISCHARGE NOTE PROVIDER - PROVIDER TOKENS
PROVIDER:[TOKEN:[[id number]],FOLLOWUP:[1 week]],PROVIDER:[TOKEN:[[id number]],FOLLOWUP:[1 week]],PROVIDER:[TOKEN:[[id number]],FOLLOWUP:[1 week]] PROVIDER:[TOKEN:[[id number]],FOLLOWUP:[1 week]],PROVIDER:[TOKEN:[[id number]],FOLLOWUP:[1 week]],PROVIDER:[TOKEN:[[id number]],FOLLOWUP:[1 week]],FREE:[LAST:[Yenny],FIRST:[Lorene],PHONE:[phone]],FAX:[(   )    -],ADDRESS:[Lorene Ramon MD  Cardiovascular Disease, Internal Medicine, Cardiology  LOCATION  [facility] + [facility]/[place] [address]  Phone: [phone]],FOLLOWUP:[1 week]] PROVIDER:[TOKEN:[[id number]],FOLLOWUP:[1 week]],PROVIDER:[TOKEN:[[id number]],FOLLOWUP:[1 week]],PROVIDER:[TOKEN:[[id number]],FOLLOWUP:[1 week]],PROVIDER:[TOKEN:[[id number]],SCHEDULEDAPPT:[08/01/2023]],FREE:[LAST:[Yenny],FIRST:[Lorene],PHONE:[phone]],FAX:[(   )    -],ADDRESS:[Lorene Ramon MD  Cardiovascular Disease, Internal Medicine, Cardiology  LOCATION  [facility] + [facility]/[place] [address]  Phone: [phone]],FOLLOWUP:[1 week]]

## 2023-06-26 NOTE — PROGRESS NOTE ADULT - PROBLEM SELECTOR PLAN 10
Persistent L>R UE and LE weakness and paresthesias  -Increase Gabapentin to 500mg TID and uptitrate to 600mg TID in AM  (if Cr remains stable)      #Chronic back pain  -pt endorsing 11/10 generalized pain and likely pain seeking. Pt demanding Dilaudid 1mg IV Q6 and states it is prescribed in outpt, however confirmed w/ outpt pharmacy and pt only prescribed Oxycodone 5mg Q6 PRN  -Medicine co-managing, recommending outpt pain management f/u  -Reached out to pain management, does not see pts in hospital. Also, discussed case with palliative care whom also does not handle pain management  -Per [facility], pt received oxycodone 10 mg dose, 60 pill quantity on 12/01/22; Will augment oxycodone 10 mg BID for now  -C/w Tylenol 650mg Q8 PRN and above Gabapentin    F: None  E: Replete if K<4 or Mag<2  N: DASH Diet  VTEppx: Heparin gtt  Dispo: Cardiac tele Persistent L>R UE and LE weakness and paresthesias  -Increase Gabapentin to 500mg TID and uptitrate to 600mg TID in AM  (if Cr remains stable)      #Chronic back pain  -pt endorsing 11/10 generalized pain and likely pain seeking. Pt demanding Dilaudid 1mg IV Q6 and states it is prescribed in outpt, however confirmed w/ outpt pharmacy and pt only prescribed Oxycodone 5mg Q6 PRN  -Medicine co-managing, recommending outpt pain management f/u  -Reached out to pain management, does not see pts in hospital. Also, discussed case with palliative care whom also does not handle pain management  -Per [facility], pt received oxycodone 10 mg dose, 60 pill quantity on 12/01/22; Will augment oxycodone 10 mg BID for now  -C/w Tylenol 650mg Q8 PRN and above Gabapentin  -Psych evaluation recommended given drug seeking behavior    F: None  E: Replete if K<4 or Mag<2  N: DASH Diet  VTEppx: Heparin gtt  Dispo: Cardiac tele Persistent L>R UE and LE weakness and paresthesias  -Increase Gabapentin to 500mg TID and uptitrate to 600mg TID in AM  (if Cr remains stable)      #Chronic back pain  -pt endorsing 11/10 generalized pain and likely pain seeking. Pt demanding Dilaudid 1mg IV Q6 and states it is prescribed in outpt, however confirmed w/ outpt pharmacy and pt only prescribed Oxycodone 5mg Q6 PRN  -Medicine co-managing, recommending outpt pain management f/u  -Reached out to pain management, does not see pts in hospital. Also, discussed case with palliative care whom also does not handle pain management  -Per [facility], pt received oxycodone 10 mg dose, 60 pill quantity on 12/01/22; Will augment oxycodone 10 mg BID for now  -C/w Tylenol 650mg Q8 PRN and above Gabapentin  -Psych evaluation recommended given drug seeking behavior-- however pt adamantly refused     F: None  E: Replete if K<4 or Mag<2  N: DASH Diet  VTEppx: Heparin gtt  Dispo: Cardiac tele

## 2023-06-26 NOTE — PROGRESS NOTE ADULT - PROBLEM SELECTOR PLAN 1
presents w/ substernal chest tightness at rest, currently CP free and HD stable  -HsTrop T 55->52-->32  -EKG: NSR, non ischemic  -TTE 6/21: hyperdynamic LVEF, mild LVH, no valvular disease  -CCTA 6/23: Ca score 129 (accuracy of score is limited by image noise), probable severe pRCA stenosis, mod D2 stenosis, non obstructive in remaining coronaries  -NPO after MN for cardiac cath in AM, pt consented  -  Pre cath load: s/p ASA 325mg in ED and given Plavix 600mg in AM prior to cath  -  Pre cath IVF hydration: NS 250cc bolus followed by maintenance 75cc/hr x 2hrs  -  Premedication for contrast allergy: Solucortef 200mg IVP x 1 and Benadryl 50mg IVP pre and post cath   -Continue ASA 81mg QD, Lipitor 40mg HS and Toprol 50mg QD Patient presents w/ substernal chest tightness at rest, currently CP free and HD stable  - HsTrop T 55->52-->32  - EKG: NSR, non ischemic  - TTE 6/21: hyperdynamic LVEF, mild LVH, no valvular disease  -CCTA 6/23: Ca score 129 (accuracy of score is limited by image noise), probable severe pRCA stenosis, mod D2 stenosis, non obstructive in remaining coronaries  - NPO after MN for cardiac cath in AM, pt consented  -  Pre cath load: s/p ASA 325mg in ED and given Plavix 600mg in AM prior to cath  -  Pre cath IVF hydration: NS 250cc bolus followed by maintenance 75cc/hr x 2hrs  -  Premedication for contrast allergy: Solucortef 200mg IVP x 1 and Benadryl 50mg IVP pre and post cath   -Continue ASA 81mg QD, Lipitor 40mg HS and Toprol 50mg QD
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
## 2023-06-26 NOTE — DISCHARGE NOTE PROVIDER - NSDCFUADDAPPT_GEN_ALL_CORE_FT
**You must be seen by a cardiologist within 7-10 days of discharge. Dr. Garcia's office will contact you in the next 24 hours to get your 8/1/2023 moved up to the appropriate time frame. If you do not hear back from them, please make sure you contact their office: [phone]

## 2023-06-26 NOTE — PROGRESS NOTE ADULT - ASSESSMENT
43F w/ asthma, PE (on eliquis), HTN, DM, HLD, abdominal nec fasc (s/p suprapubic catheter and ostomy on Nov 2021), and recent admission to [facility] 5/22-6/1 for UTI, presenting with left sided chest pain for 1 day, found to have negative cardiac work-up, persistent hyperglycemia, ICU consulted for transfer to medical telemetry.    A1C: 11.9 %  BUN: 26  Creatinine: 1.30  GFR: 52  Weight: 101.2  BMI: 34.9

## 2023-06-26 NOTE — PROGRESS NOTE ADULT - ATTENDING COMMENTS
I have personally seen and examined the patient.  I fully participated in the care of this patient.  I have made amendments to the documentation where necessary, and agree with the history, physical exam, and plan as documented by the Resident.  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,  PE (on Eliquis last dose AM 6/20/23), DM2-poorly controlled on insulin therapy, HTN, HLD, hx abdominal necrotizing fasciitis (s/p suprapubic catheter, ostomy placement in 11/2021), s/p intubation, hx of frequent UTIs 2/2 to suprapubic catheter, most recent UTI c/b Urosepsis admitted [facility] 5/23-6/1 (Klebsiella and Ecoli discharged with left midline for tx w/ Ertapenem 6/9 and Gentamicin 6/5; pt reports missing two days tx of Ertapenem due to midline malfunction; removed ~1week ago by home care RN)  who presesents to [facility] ED on 6/20/23 for chest pain ( pressure like sensation, Leucytosis, severe hyperglycemia ( more than 600s ), LORA , lactic acidosis - admitted to tele for elevated troponin and chest pain concern ACS.     pt seen with Dr. Herndon, cardiology NP Adrian  she would answer questions from Dr. Herndon, would not answer questions from me, stating she did not want to talk to me. but she is ok with me listening to the conversation with Dr. Herndon leading the conversation.  was given dilaudid 0.5 mg this am   she is agreeable to have physical therapy with aim to keep her mobile  stated pain is not controlled,   blood test clotted this am -waiting for labs to see renal function.   cardiology plan for [facility] today.     - SIRS from non-infectious etiology ( leucocytosis / LORA/ lactic acidosis -electrolyte derangements -uncontrolled DM with hyperglycemia and mild DKA on admission -now much improved - though sugar still fluctuating -she required Steroids for iodine allergy in prep for CT scan  supra-pubic pain with recent treated MDR UTI - now UA with bacteria/LE -no other source of infection seen,   leucocytosis resolved without antibiotics,   lactic acidosis resolved with IVF/insulin- received more than 4 L of IVF  ID evaluated patient 6/21-recommend to monitor off antibiotics  - we talked about removing supra-pubic catheter, she talked about hard for her to get up and get to bathroom due to her left leg weakness from prior cva and declined to have that removed, we continue to encourage her to consider to have supra-pubic catheter removed ( have catheter for convenience )  - monitor cbc/ fever curve and to start antibiotics as per ID rec.     - nystatin powder to apply to skin fold around the lower abdomen.     - DM ( on home insulin with Hyperglycemia )- poorly controlled- hb A1c 11 -non compliance with insulin   presented with Hyperglycemia/ Hypokalemia/ AG 24 on admission, LORA- cr 1.8 - given insulin, IVF, Glucose went to >600 after single dose of solumedrol given due to allergy to iodine prior to CT scan - now gap closed, fluctuating glucose level , nausea with poor oral intake   ? hyperglycemia related to infection/sepsis vs non-compliance with meds.  hypokalemia/ HypoMg - to replace goal for K around 4 to 4.5, Mg around 2.   - endocrine has been consulted ( pt known to endo service )     - chest pain with elevated troponin- NSTEMI,  - pt with hx of DM -work up in progress for ACS vs type 2 from Demand ischemia from metabolic derangement   - TTE -EF 70-75 %   -CTTA " The calcium score is  129 Agatston units (accuracy of score is   limited by image noise),  Probable severe proximal RCA stenosis,  Moderate D2 stenosis (small vessel),  Non-obstructive disease in the remaining coronary segments  dw cardiology -plan for [facility] monday.    -- hx of PE on eliquis-- currently  on heparin gtt.     - colostomy bag looks clean, due to her body habitus, skin fold around the supra-pubic catheter area, site tender ( no erythema noted ) would suggest IR/ EU evaluation for Catheter removal - ( Suprapubic catheter was placed for convenience as per record, now that she can ambulate few steps we encouraged her to use camote   ) prn meds for bladder spasm. ( can use oxybutynin     - LORA on admission due to dehydration/Electrolyte derangement from Hyperglycemia- now improved- Cr is 1.1 now--> 1.3 today.     - neuropathic pain - reported gabapentin helps- would renally dose gabapentin -now with improvement in renal function to increase gabapentin to 500 tid today will continue with same dose 500 mg tid pending BMP ( last bmp cr 1.3 -up from 1.1 )  - ( reported home dose 600 mg tid -) depending on renal function-to titrate up.   - add tylenol 650 mg po q 8 hourly standing for Chronic pain  - reported Chronic pain -more than one year now, worse for couple of months now.   - she felt oxycodone 5 mg per dose is not enough for her pain control --currently ordered as q 4 hourly prn-she received 4 doses over last 24 hour-  reportedly she took oxycodone 10 mg per dose -maximum two doses per day prior to admission - based on istop dw cardiology NP- would increase oxycodone to 10 mg po q 12 hourly prn-, to readjust based on response      - iv dilaudid prn for breakthrough pain, we talked about iv dilaudid not sustainable and we would need to work on oral regime for pain control to transit to out-patient care. She did not directly answer to my questions and voiced that she did not want to engage in my conversation, in the best interest of patient care, will ask [facility] to take over the care, verbal sign out given to Dr. Orlando Fair.   plan of care dw Dr. Herndon, cardiology NP, attending Dr. Barraza- Dr. Orlando Fair to follow patient with med consult resident.  thank you for allowing me to participate in the care. I have personally seen and examined the patient.  I fully participated in the care of this patient.  I have made amendments to the documentation where necessary, and agree with the history, physical exam, and plan as documented by the Resident.  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,  PE (on Eliquis last dose AM 6/20/23), DM2-poorly controlled on insulin therapy, HTN, HLD, hx abdominal necrotizing fasciitis (s/p suprapubic catheter, ostomy placement in 11/2021), s/p intubation, hx of frequent UTIs 2/2 to suprapubic catheter, most recent UTI c/b Urosepsis admitted [facility] 5/23-6/1 (Klebsiella and Ecoli discharged with left midline for tx w/ Ertapenem 6/9 and Gentamicin 6/5; pt reports missing two days tx of Ertapenem due to midline malfunction; removed ~1week ago by home care RN)  who presesents to [facility] ED on 6/20/23 for chest pain ( pressure like sensation, Leucytosis, severe hyperglycemia ( more than 600s ), LORA , lactic acidosis - admitted to tele for elevated troponin and chest pain concern ACS.     pt seen with Dr. Herndon, cardiology Karlee Gibson.  she would answer questions from Dr. Herndno, would not answer questions from me, stating she did not want to talk to me. but she is ok with me listening to the conversation with Dr. Herndon leading the conversation.  was given dilaudid 0.5 mg this am   she is agreeable to have physical therapy with aim to keep her mobile  stated pain is not controlled,   blood test clotted this am -waiting for labs to see renal function.   cardiology plan for [facility] today.     - SIRS from non-infectious etiology ( leucocytosis / LORA/ lactic acidosis -electrolyte derangements -uncontrolled DM with hyperglycemia and mild DKA on admission -now much improved - though sugar still fluctuating -she required Steroids for iodine allergy in prep for CT scan  supra-pubic pain with recent treated MDR UTI - now UA with bacteria/LE -no other source of infection seen,   leucocytosis resolved without antibiotics,   lactic acidosis resolved with IVF/insulin- received more than 4 L of IVF  ID evaluated patient 6/21-recommend to monitor off antibiotics  - we talked about removing supra-pubic catheter, she talked about hard for her to get up and get to bathroom due to her left leg weakness from prior cva and declined to have that removed, we continue to encourage her to consider to have supra-pubic catheter removed ( have catheter for convenience )  - monitor cbc/ fever curve and to start antibiotics as per ID rec.     - nystatin powder to apply to skin fold around the lower abdomen.     - DM ( on home insulin with Hyperglycemia )- poorly controlled- hb A1c 11 -non compliance with insulin   presented with Hyperglycemia/ Hypokalemia/ AG 24 on admission, LORA- cr 1.8 - given insulin, IVF, Glucose went to >600 after single dose of solumedrol given due to allergy to iodine prior to CT scan - now gap closed, fluctuating glucose level , nausea with poor oral intake   ? hyperglycemia related to infection/sepsis vs non-compliance with meds.  hypokalemia/ HypoMg - to replace goal for K around 4 to 4.5, Mg around 2.   - endocrine has been consulted ( pt known to endo service )     - chest pain with elevated troponin- NSTEMI,  - pt with hx of DM -work up in progress for ACS vs type 2 from Demand ischemia from metabolic derangement   - TTE -EF 70-75 %   -CTTA " The calcium score is  129 Agatston units (accuracy of score is   limited by image noise),  Probable severe proximal RCA stenosis,  Moderate D2 stenosis (small vessel),  Non-obstructive disease in the remaining coronary segments  dw cardiology -plan for [facility] monday.    -- hx of PE on eliquis-- currently  on heparin gtt.     - colostomy bag looks clean, due to her body habitus, skin fold around the supra-pubic catheter area, site tender ( no erythema noted ) would suggest IR/ EU evaluation for Catheter removal - ( Suprapubic catheter was placed for convenience as per record, now that she can ambulate few steps we encouraged her to use camote   ) prn meds for bladder spasm. ( can use oxybutynin     - LORA on admission due to dehydration/Electrolyte derangement from Hyperglycemia- now improved- Cr is 1.1 now--> 1.3 today.     - neuropathic pain - reported gabapentin helps- would renally dose gabapentin -now with improvement in renal function to increase gabapentin to 500 tid today will continue with same dose 500 mg tid pending BMP ( last bmp cr 1.3 -up from 1.1 )  - ( reported home dose 600 mg tid -) depending on renal function-to titrate up.   - add tylenol 650 mg po q 8 hourly standing for Chronic pain  - reported Chronic pain -more than one year now, worse for couple of months now.   - she felt oxycodone 5 mg per dose is not enough for her pain control --currently ordered as q 4 hourly prn-she received 4 doses over last 24 hour-  reportedly she took oxycodone 10 mg per dose -maximum two doses per day prior to admission - based on istop dw cardiology NP- would increase oxycodone to 10 mg po q 12 hourly prn-, to readjust based on response      - iv dilaudid prn for breakthrough pain, we talked about iv dilaudid not sustainable and we would need to work on oral regime for pain control to transit to out-patient care. She did not directly answer to my questions and voiced that she did not want to engage in my conversation, in the best interest of patient care, will ask [facility] to [facility] the care, verbal sign out given to Dr. Orlando Fair.   plan of care dw Dr. Herndon, cardiology NP, attending Dr. Barraza- Dr. Orlando Fair to follow patient with med consult resident.  thank you for allowing me to participate in the care.

## 2023-06-26 NOTE — DISCHARGE NOTE PROVIDER - NPI NUMBER (FOR SYSADMIN USE ONLY) :
[[id number]] [[id number]],[[id number]] [[id number]],[[id number]]

## 2023-06-26 NOTE — PROGRESS NOTE ADULT - SUBJECTIVE AND OBJECTIVE BOX
Interventional Cardiology PA Adult Progress Note    C.C.:     Subjective Assessment:      ROS Negative except as per Subjective and HPI  	  MEDICATIONS:  metoprolol succinate ER 50 milliGRAM(s) Oral daily      albuterol    90 MICROgram(s) HFA Inhaler 2 Puff(s) Inhalation every 6 hours PRN  diphenhydrAMINE Injectable 50 milliGRAM(s) IV Push once  diphenhydrAMINE Injectable 50 milliGRAM(s) IV Push once    acetaminophen     Tablet .. 650 milliGRAM(s) Oral every 8 hours PRN  gabapentin 600 milliGRAM(s) Oral three times a day  oxyCODONE    IR 5 milliGRAM(s) Oral every 4 hours PRN      atorvastatin 20 milliGRAM(s) Oral at bedtime  dextrose 50% Injectable 12.5 Gram(s) IV Push once  dextrose 50% Injectable 25 Gram(s) IV Push once  dextrose 50% Injectable 25 Gram(s) IV Push once  dextrose Oral Gel 15 Gram(s) Oral once PRN  glucagon  Injectable 1 milliGRAM(s) IntraMuscular once  hydrocortisone sodium succinate Injectable 200 milliGRAM(s) IV Push once  insulin glargine Injectable (LANTUS) 40 Unit(s) SubCutaneous <User Schedule>  insulin lispro (ADMELOG) corrective regimen sliding scale   SubCutaneous Before meals and at bedtime  insulin lispro Injectable (ADMELOG) 30 Unit(s) SubCutaneous three times a day before meals    aspirin enteric coated 81 milliGRAM(s) Oral daily  dextrose 5%. 1000 milliLiter(s) IV Continuous <Continuous>  dextrose 5%. 1000 milliLiter(s) IV Continuous <Continuous>  heparin  Infusion.  Unit(s)/Hr IV Continuous <Continuous>  oxybutynin 5 milliGRAM(s) Oral three times a day  sodium chloride 0.9%. 500 milliLiter(s) IV Continuous <Continuous>      	    [PHYSICAL EXAM:  TELEMETRY:  T(C): 36.6 (06-26-23 @ 09:13), Max: 37.2 (06-25-23 @ 18:06)  HR: 100 (06-26-23 @ 08:41) (76 - 100)  BP: 140/73 (06-26-23 @ 08:41) (108/58 - 150/65)  RR: 18 (06-26-23 @ 08:41) (15 - 18)  SpO2: 96% (06-26-23 @ 08:41) (94% - 98%)  Wt(kg): --  I&O's Summary    25 Jun 2023 07:01  -  26 Jun 2023 07:00  --------------------------------------------------------  IN: 390 mL / OUT: 1200 mL / NET: -810 mL    26 Jun 2023 07:01  -  26 Jun 2023 10:21  --------------------------------------------------------  IN: 343 mL / OUT: 250 mL / NET: 93 mL        Cook:  Central/PICC/Mid Line:                                         Appearance: Normal	  HEENT:   Normal oral mucosa, PERRL, EOMI	  Neck: Supple, + JVD/ - JVD; Carotid Bruit   Cardiovascular: Normal S1 S2, No JVD, No murmurs,   Respiratory: Lungs clear to auscultation/Decreased Breath Sounds/No Rales, Rhonchi, Wheezing	  Gastrointestinal:  Soft, Non-tender, + BS	  Skin: No rashes, No ecchymoses, No cyanosis  Extremities: Normal range of motion, No clubbing, cyanosis or edema  Vascular: Peripheral pulses palpable 2+ bilaterally  Neurologic: Non-focal  Psychiatry: A & O x 3, Mood & affect appropriate      	    ECG:  	  RADIOLOGY:   DIAGNOSTIC TESTING:  [ ] Echocardiogram:  [ ]  Catheterization:  [ ] Stress Test:    [ ] AILYN  OTHER: 	    LABS:	 	  CARDIAC MARKERS:                                  10.5   8.72  )-----------( 409      ( 25 Jun 2023 05:30 )             35.4     06-25    139  |  102  |  26<H>  ----------------------------<  262<H>  3.8   |  24  |  1.30    Ca    8.0<L>      25 Jun 2023 05:30  Phos  2.9     06-25  Mg     1.5     06-25    TPro  7.0  /  Alb  3.2<L>  /  TBili  <0.2  /  DBili  x   /  AST  14  /  ALT  11  /  AlkPhos  140<H>  06-25    proBNP:   Lipid Profile:   HgA1c:   TSH:   PTT - ( 25 Jun 2023 05:30 )  PTT:70.2 sec    ASSESSMENT/PLAN: 	        DVT ppx:  Dispo:     Interventional Cardiology PA Adult Progress Note    Subjective Assessment: Pt seen and examined at bedside. Pt is frustrated as she reports she has not been getting her pain medications and is very upset during evaluation. Pt was refusing removal of suprapubic catheter. Pt is scheduled for cardiac catheterization today.     ROS Negative except as per Subjective and HPI  	  MEDICATIONS:  metoprolol succinate ER 50 milliGRAM(s) Oral daily  albuterol    90 MICROgram(s) HFA Inhaler 2 Puff(s) Inhalation every 6 hours PRN  diphenhydrAMINE Injectable 50 milliGRAM(s) IV Push once  diphenhydrAMINE Injectable 50 milliGRAM(s) IV Push once  acetaminophen     Tablet .. 650 milliGRAM(s) Oral every 8 hours PRN  gabapentin 600 milliGRAM(s) Oral three times a day  oxyCODONE    IR 5 milliGRAM(s) Oral every 4 hours PRN  atorvastatin 20 milliGRAM(s) Oral at bedtime  dextrose 50% Injectable 12.5 Gram(s) IV Push once  dextrose 50% Injectable 25 Gram(s) IV Push once  dextrose 50% Injectable 25 Gram(s) IV Push once  dextrose Oral Gel 15 Gram(s) Oral once PRN  glucagon  Injectable 1 milliGRAM(s) IntraMuscular once  hydrocortisone sodium succinate Injectable 200 milliGRAM(s) IV Push once  insulin glargine Injectable (LANTUS) 40 Unit(s) SubCutaneous <User Schedule>  insulin lispro (ADMELOG) corrective regimen sliding scale   SubCutaneous Before meals and at bedtime  insulin lispro Injectable (ADMELOG) 30 Unit(s) SubCutaneous three times a day before meals  aspirin enteric coated 81 milliGRAM(s) Oral daily  dextrose 5%. 1000 milliLiter(s) IV Continuous <Continuous>  dextrose 5%. 1000 milliLiter(s) IV Continuous <Continuous>  heparin  Infusion.  Unit(s)/Hr IV Continuous <Continuous>  oxybutynin 5 milliGRAM(s) Oral three times a day  sodium chloride 0.9%. 500 milliLiter(s) IV Continuous <Continuous>    [PHYSICAL EXAM:  TELEMETRY:  T(C): 36.6 (06-26-23 @ 09:13), Max: 37.2 (06-25-23 @ 18:06)  HR: 100 (06-26-23 @ 08:41) (76 - 100)  BP: 140/73 (06-26-23 @ 08:41) (108/58 - 150/65)  RR: 18 (06-26-23 @ 08:41) (15 - 18)  SpO2: 96% (06-26-23 @ 08:41) (94% - 98%)  Wt(kg): --  I&O's Summary    25 Jun 2023 07:01  -  26 Jun 2023 07:00  --------------------------------------------------------  IN: 390 mL / OUT: 1200 mL / NET: -810 mL    26 Jun 2023 07:01  -  26 Jun 2023 10:21  --------------------------------------------------------  IN: 343 mL / OUT: 250 mL / NET: 93 mL                                    Appearance: Normal	  HEENT:   Normal oral mucosa, PERRL, EOMI	  Neck: Supple, - JVD; Carotid Bruit   Cardiovascular: Normal S1 S2, No JVD, No murmurs   Respiratory: Lungs clear to auscultation/Decreased Breath Sounds/No Rales, Rhonchi, Wheezing	  Gastrointestinal:  Soft, Non-tender, + BS	  Skin: No rashes, No ecchymoses, No cyanosis  Extremities: Normal range of motion, No clubbing, cyanosis or edema  Vascular: Peripheral pulses palpable 2+ bilaterally  Neurologic: Non-focal  Psychiatry: A & O x 3, Mood & affect appropriate  	    ECG: NSR 	  XRAY 06/20/23: Normal  DIAGNOSTIC TESTING:  [ ] Echocardiogram:  06/21/23 LVEF 70-75%, hyperdynamic LV systolic function. mild LVH. normal RV size and systolic function, normal atria, no significant valvular heart disease. no pericardial effusion.   [ ] CCTA 6/23: Ca score 129 (accuracy of score is limited by image noise), probable severe pRCA stenosis, mod D2 stenosis, non obstructive in remaining coronaries	  	  CARDIAC MARKERS: Trop 55->52->32               10.5   8.72  )-----------( 409      ( 25 Jun 2023 05:30 )             35.4     06-25    139  |  102  |  26<H>  ----------------------------<  262<H>  3.8   |  24  |  1.30    Ca    8.0<L>      25 Jun 2023 05:30  Phos  2.9     06-25  Mg     1.5     06-25    TPro  7.0  /  Alb  3.2<L>  /  TBili  <0.2  /  DBili  x   /  AST  14  /  ALT  11  /  AlkPhos  140<H>  06-25    proBNP: 435  LDL: 150   HgA1c: 11.9  TSH: 0.305  PTT - ( 25 Jun 2023 05:30 )  PTT:70.2 sec Interventional Cardiology PA Adult Progress Note    Subjective Assessment: Pt seen and examined at bedside. Pt is frustrated as she reports she has not been getting her pain medications and is very upset during evaluation. Pt was refusing removal of suprapubic catheter. Pt is scheduled for cardiac catheterization today.     ROS Negative except as per Subjective and HPI  	  MEDICATIONS:  metoprolol succinate ER 50 milliGRAM(s) Oral daily  albuterol    90 MICROgram(s) HFA Inhaler 2 Puff(s) Inhalation every 6 hours PRN  diphenhydrAMINE Injectable 50 milliGRAM(s) IV Push once  diphenhydrAMINE Injectable 50 milliGRAM(s) IV Push once  acetaminophen     Tablet .. 650 milliGRAM(s) Oral every 8 hours PRN  gabapentin 600 milliGRAM(s) Oral three times a day  oxyCODONE    IR 5 milliGRAM(s) Oral every 4 hours PRN  atorvastatin 20 milliGRAM(s) Oral at bedtime  dextrose 50% Injectable 12.5 Gram(s) IV Push once  dextrose 50% Injectable 25 Gram(s) IV Push once  dextrose 50% Injectable 25 Gram(s) IV Push once  dextrose Oral Gel 15 Gram(s) Oral once PRN  glucagon  Injectable 1 milliGRAM(s) IntraMuscular once  hydrocortisone sodium succinate Injectable 200 milliGRAM(s) IV Push once  insulin glargine Injectable (LANTUS) 40 Unit(s) SubCutaneous <User Schedule>  insulin lispro (ADMELOG) corrective regimen sliding scale   SubCutaneous Before meals and at bedtime  insulin lispro Injectable (ADMELOG) 30 Unit(s) SubCutaneous three times a day before meals  aspirin enteric coated 81 milliGRAM(s) Oral daily  dextrose 5%. 1000 milliLiter(s) IV Continuous <Continuous>  dextrose 5%. 1000 milliLiter(s) IV Continuous <Continuous>  heparin  Infusion.  Unit(s)/Hr IV Continuous <Continuous>  oxybutynin 5 milliGRAM(s) Oral three times a day  sodium chloride 0.9%. 500 milliLiter(s) IV Continuous <Continuous>    [PHYSICAL EXAM:  TELEMETRY:  T(C): 36.6 (06-26-23 @ 09:13), Max: 37.2 (06-25-23 @ 18:06)  HR: 100 (06-26-23 @ 08:41) (76 - 100)  BP: 140/73 (06-26-23 @ 08:41) (108/58 - 150/65)  RR: 18 (06-26-23 @ 08:41) (15 - 18)  SpO2: 96% (06-26-23 @ 08:41) (94% - 98%)  Wt(kg): --  I&O's Summary    25 Jun 2023 07:01  -  26 Jun 2023 07:00  --------------------------------------------------------  IN: 390 mL / OUT: 1200 mL / NET: -810 mL    26 Jun 2023 07:01  -  26 Jun 2023 10:21  --------------------------------------------------------  IN: 343 mL / OUT: 250 mL / NET: 93 mL                                    Appearance: Normal	  HEENT:   Normal oral mucosa, PERRL, EOMI	  Neck: Supple, - JVD; Carotid Bruit   Cardiovascular: Normal S1 S2, No JVD, No murmurs   Respiratory: Lungs clear to auscultation/Decreased Breath Sounds/No Rales, Rhonchi, Wheezing	  Gastrointestinal:  Soft, Non-tender, + BS	  Skin: No rashes, No ecchymoses, No cyanosis  Extremities: Normal range of motion, No clubbing, cyanosis or edema  Vascular: Peripheral pulses palpable 2+ bilaterally  Neurologic: Non-focal  Psychiatry: A & O x 3, Mood & affect appropriate  	    ECG: NSR 	  XRAY 06/20/23: Normal  DIAGNOSTIC TESTING:  [x ] Echocardiogram:  06/21/23 LVEF 70-75%, hyperdynamic LV systolic function. mild LVH. normal RV size and systolic function, normal atria, no significant valvular heart disease. no pericardial effusion.   x[ ] CCTA 6/23: Ca score 129 (accuracy of score is limited by image noise), probable severe pRCA stenosis, mod D2 stenosis, non obstructive in remaining coronaries	  	  CARDIAC MARKERS: Trop 55->52->32               10.5   8.72  )-----------( 409      ( 25 Jun 2023 05:30 )             35.4     06-25    139  |  102  |  26<H>  ----------------------------<  262<H>  3.8   |  24  |  1.30    Ca    8.0<L>      25 Jun 2023 05:30  Phos  2.9     06-25  Mg     1.5     06-25    TPro  7.0  /  Alb  3.2<L>  /  TBili  <0.2  /  DBili  x   /  AST  14  /  ALT  11  /  AlkPhos  140<H>  06-25    proBNP: 435  LDL: 150   HgA1c: 11.9  TSH: 0.305  PTT - ( 25 Jun 2023 05:30 )  PTT:70.2 sec

## 2023-06-26 NOTE — PROGRESS NOTE ADULT - SUBJECTIVE AND OBJECTIVE BOX
SUBJECTIVE / INTERVAL HPI: Patient was seen and examined this morning. Premedicated with Solucortef 200mg prior to CCTA on Friday. CCTA 6/23: Ca score 129 (accuracy of score is limited by image noise), probable severe pRCA stenosis, mod D2 stenosis, non obstructive in remaining coronaries. Now NPO for cardiac cath today and will received additional Solucortef 200mg this afternoon. She has been intermittently NPO for various procedures. Reports appetite when allowed to eat, but food is cold and lengthy NPO periods makes her nauseous. Insulin doses increased over the weekend, but still hyperglycemic.    CAPILLARY BLOOD GLUCOSE & INSULIN RECEIVED  262 mg/dL (06-25 @ 05:30) - Lantus 40 and lispro 30+6  256 mg/dL (06-25 @ 06:19)  108 mg/dL (06-25 @ 11:57) - Lispro 30  116 mg/dL (06-25 @ 14:05)  160 mg/dL (06-25 @ 16:31) - Lispro 0+2. Ate late dinner - caesar salad, cold rice and green beans.  312 mg/dL (06-25 @ 21:37) - Lantus 40 + lispro 8  239 mg/dL (06-26 @ 06:39)  225 mg/dL (06-26 @ 08:50) - Lantus 40 + lispro 4  203 mg/dL (06-26 @ 11:36)    REVIEW OF SYSTEMS  Constitutional:  (+) loss of appetite. Negative fever, chills.   Cardiovascular:  Negative for chest pain or palpitations.  Respiratory:  Negative for cough, wheezing, or shortness of breath.    Gastrointestinal:  (+) Nausea. Negative for vomiting, diarrhea, constipation, or abdominal pain.  Genitourinary:  (+) Suprapubic pain.  Neurologic:  No headache, confusion, dizziness, lightheadedness.    PHYSICAL EXAM  Vital Signs Last 24 Hrs  T(C): 36.6 (26 Jun 2023 09:13), Max: 37.2 (25 Jun 2023 18:06)  T(F): 97.9 (26 Jun 2023 09:13), Max: 98.9 (25 Jun 2023 18:06)  HR: 100 (26 Jun 2023 08:41) (76 - 100)  BP: 140/73 (26 Jun 2023 08:41) (108/58 - 150/65)  BP(mean): 98 (26 Jun 2023 08:41) (76 - 98)  RR: 18 (26 Jun 2023 08:41) (15 - 18)  SpO2: 96% (26 Jun 2023 08:41) (94% - 97%)    Parameters below as of 26 Jun 2023 08:41  Patient On (Oxygen Delivery Method): room air    Constitutional: Awake, alert, obese female, in no acute distress.   HEENT: Normocephalic, atraumatic, NAOMI.  Respiratory: Lungs clear to ausculation bilaterally.   Cardiovascular: regular rhythm, normal S1 and S2, no audible murmurs.   GI: soft, non-tender, non-distended, bowel sounds present. (+) Ostomy bag in place.   : (+) Suprapubic catheter.   Extremities: No lower extremity edema.  Psychiatric: AAO x 3. Normal affect/mood.     LABS  CBC - WBC/HGB/HTC/PLT: 8.72/10.5/35.4/409 (06-25-23)  BMP - Na/K/Cl/Bicarb/BUN/Cr/Gluc/AG/eGFR: 139/3.8/102/24/26/1.30/262/13/52 (06-25-23)  Ca - 8.0 (06-25-23)  Phos - 2.9 (06-25-23)  Mg - 1.5 (06-25-23)  LFT - Alb/Tprot/Tbili/Dbili/AlkPhos/ALT/AST: 3.2/--/<0.2/--/140/11/14 (06-25-23)  PT/aPTT/INR: --/70.2/-- (06-25-23)   Thyroid Stimulating Hormone, Serum: 0.305 (06-21-23)      MEDICATIONS  MEDICATIONS  (STANDING):  aspirin enteric coated 81 milliGRAM(s) Oral daily  atorvastatin 20 milliGRAM(s) Oral at bedtime  dextrose 5%. 1000 milliLiter(s) (50 mL/Hr) IV Continuous <Continuous>  dextrose 5%. 1000 milliLiter(s) (100 mL/Hr) IV Continuous <Continuous>  dextrose 50% Injectable 12.5 Gram(s) IV Push once  dextrose 50% Injectable 25 Gram(s) IV Push once  dextrose 50% Injectable 25 Gram(s) IV Push once  diphenhydrAMINE Injectable 50 milliGRAM(s) IV Push once  diphenhydrAMINE Injectable 50 milliGRAM(s) IV Push once  gabapentin 600 milliGRAM(s) Oral three times a day  glucagon  Injectable 1 milliGRAM(s) IntraMuscular once  heparin  Infusion.  Unit(s)/Hr (18 mL/Hr) IV Continuous <Continuous>  hydrocortisone sodium succinate Injectable 200 milliGRAM(s) IV Push once  insulin glargine Injectable (LANTUS) 40 Unit(s) SubCutaneous <User Schedule>  insulin lispro (ADMELOG) corrective regimen sliding scale   SubCutaneous Before meals and at bedtime  insulin lispro Injectable (ADMELOG) 30 Unit(s) SubCutaneous three times a day before meals  lactobacillus acidophilus 1 Tablet(s) Oral daily  metoprolol succinate ER 50 milliGRAM(s) Oral daily  oxybutynin 5 milliGRAM(s) Oral three times a day  sodium chloride 0.9%. 500 milliLiter(s) (75 mL/Hr) IV Continuous <Continuous>    MEDICATIONS  (PRN):  acetaminophen     Tablet .. 650 milliGRAM(s) Oral every 8 hours PRN Mild Pain (1 - 3)  albuterol    90 MICROgram(s) HFA Inhaler 2 Puff(s) Inhalation every 6 hours PRN Shortness of Breath and/or Wheezing  dextrose Oral Gel 15 Gram(s) Oral once PRN Blood Glucose LESS THAN 70 milliGRAM(s)/deciliter  oxyCODONE    IR 5 milliGRAM(s) Oral every 4 hours PRN Moderate Pain (4 - 6)

## 2023-06-26 NOTE — PROGRESS NOTE ADULT - PROBLEM SELECTOR PLAN 1
- Lantus   units at bedtime and Lantus  units this AM.   - Increase lispro to  units before each meal.  - Continue lispro moderate dose sliding scale before meals and at bedtime.  - Patient's fingerstick glucose goal is 100-180 mg/dL.    - For discharge, patient can U500 and admelog, dose TBD.  - Patient can follow up at discharge with [facility] by calling [phone] to make an appointment.      Case discussed with Dr. Rose. Primary team updated. - Increase Lantus to 50 units at bedtime and Lantus 50 units this AM.   - Continue lispro 30  units before each meal.  - Continue lispro moderate dose sliding scale before meals and at bedtime.  - Patient's fingerstick glucose goal is 100-180 mg/dL.    - For discharge, patient can U500 and admelog, dose TBD.  - Patient can follow up at discharge with [facility] by calling [phone] to make an appointment.      Case discussed with Dr. Rose. Primary team updated.

## 2023-06-26 NOTE — PROGRESS NOTE ADULT - PROBLEM SELECTOR PLAN 4
Persistent hypokalemia, despite multiple repletion on 6/21, now improving; HypoK likely 2/2 hyperglycemia  -K today 3.8, s/p Potassium 40mEq x 1 on 06/25. Labs were attempted but was unable to be drawn   -Continue to trend daily and supplement w/ goal K >4

## 2023-06-26 NOTE — PROGRESS NOTE ADULT - ASSESSMENT
44yo woman w/ hx asthma, hx PE on home Eliquis, DM, HTN, HLD, hx abdominal necrotizing fasciitis s/p suprapubic catheter, ostomy placement in 11/2021 who presented to [facility] ED from home with worsening abdominal and suprapubic pain and drainage from prior suprapubic catheter insertion site, recent admission 5/23-6/1 for sepsis 2/2 UTI and catheter-site infection on gentamicin and ertapenem, who presents for acute chest pain w/ elevated troponins, medicine team consulted for management of severe sepsis and hyperglycemia.    Plan/Recommendations:  #R/O Sepsis  On prior admission 5-23-6/1 pt had presented with worsening lower abd pain, drainage from suprapubic catheter site noted in setting of elevated lactate 5.3 in setting of severe sepsis 2/2 UTI from suprapubic catheter. At that time, UCx (5/22) growing carbapenem-resistant Klebsiella & E. Coli. Blood cultures (5/23) NGTD. S/p suprapubic catheter replacement by IR on 5/25 and since treated w/ course gentamicin and ertapenem via PICC on discharge. On current admission, patient presenting with persistent pain symptoms, lactate 7.3 on admission improved w/ IVF since elevated again back up to 7.4.  - agree w/ trial off antibiotics per ID recs  - f/u BCx x2 and UCx x1  - appreciate ID recs    #Poorly-controlled IDDM  A1c 11.9, noted on admission for FSGs in 500s.  - Continue lispro 10 units before each meal.  - mISS  - appreciate endocrine recs    #Hypomagnesia  serum Mg 1.5 yesterday 6/25  - please give IV magnesium sulfate 2g x1, replete to goal serum Mg >2.0    #R/O NSTEMI  Patient presenting w/ acute substernal chest pain w/ mildly elevated high sensitivity troponins mid 50s. Otherwise no ST changes noted on ECG.  - agree with admission to cardiac telemetry for possible ischemic eval  - CCTA showing moderate stenosis proximal RCA, pending cardiac cath today 6/26    #Suprapubic/Abd pain  - agree with IR consult to evaluate removal of suprapubic catheter per patient's wishes  - agree w/ oxybutynin 5mg TID PRN for bladder spasms  - c/w prior pain regimen: Tylenol 975mg q8hrs, Ibuprofen 200mg q6hrs, Oxycodone IR PRN (moderate 5mg q6h, severe 10mg q4h), lidocaine patch for R midback paraspinal tenderness  - c/w PO gabapentin 500mg TID  - based on [facility] record, can uptitrate oxycodone to 10mg IR BID    #Hx necrotizing fasciitis s/p ostomy  Hx of necrotizing fasciitis tx at [facility] in 11/2021 with hospital course c/b bowel resection and ostomy formation. On ROS, no increased output from ostomy bag and site appears clean/dry.  - Routine ostomy care  - Monitor ostomy output  - General surgery outpatient appointment for possible ostomy reversal.    #HTN  Home medications include Labetalol 100 mg BID and Losartan 50mg Q24.  - c/w home BP meds    #Hx Pulmonary embolism  History of bilateral pulmonary emboli in segmental and subsegmental branches on the R and subsegmental branches on the L, diagnosed on CT chest during ED visit on 4/7/23. Home meds: eliquis 5mg BID  - c/w full dose AC  - transition to home PO eliquis 5mg BID when appropriate per primary team    #Asthma  - Continue home Ventolin Q24 PRN

## 2023-06-27 DIAGNOSIS — M54.9 DORSALGIA, UNSPECIFIED: ICD-10-CM

## 2023-06-27 DIAGNOSIS — E83.42 HYPOMAGNESEMIA: ICD-10-CM

## 2023-06-27 LAB
ANION GAP SERPL CALC-SCNC: 12 MMOL/L — SIGNIFICANT CHANGE UP (ref 5–17)
APTT BLD: 29.5 SEC — SIGNIFICANT CHANGE UP (ref 27.5–35.5)
BUN SERPL-MCNC: 21 MG/DL — SIGNIFICANT CHANGE UP (ref 7–23)
CALCIUM SERPL-MCNC: 7.6 MG/DL — LOW (ref 8.4–10.5)
CHLORIDE SERPL-SCNC: 100 MMOL/L — SIGNIFICANT CHANGE UP (ref 96–108)
CO2 SERPL-SCNC: 24 MMOL/L — SIGNIFICANT CHANGE UP (ref 22–31)
CREAT SERPL-MCNC: 1.14 MG/DL — SIGNIFICANT CHANGE UP (ref 0.5–1.3)
EGFR: 61 ML/MIN/1.73M2 — SIGNIFICANT CHANGE UP
GLUCOSE BLDC GLUCOMTR-MCNC: 145 MG/DL — HIGH (ref 70–99)
GLUCOSE BLDC GLUCOMTR-MCNC: 212 MG/DL — HIGH (ref 70–99)
GLUCOSE BLDC GLUCOMTR-MCNC: 231 MG/DL — HIGH (ref 70–99)
GLUCOSE BLDC GLUCOMTR-MCNC: 385 MG/DL — HIGH (ref 70–99)
GLUCOSE SERPL-MCNC: 343 MG/DL — HIGH (ref 70–99)
HCT VFR BLD CALC: 33.4 % — LOW (ref 34.5–45)
HGB BLD-MCNC: 10.1 G/DL — LOW (ref 11.5–15.5)
INR BLD: 1.07 — SIGNIFICANT CHANGE UP (ref 0.88–1.16)
MAGNESIUM SERPL-MCNC: 1.5 MG/DL — LOW (ref 1.6–2.6)
MCHC RBC-ENTMCNC: 23.9 PG — LOW (ref 27–34)
MCHC RBC-ENTMCNC: 30.2 GM/DL — LOW (ref 32–36)
MCV RBC AUTO: 79 FL — LOW (ref 80–100)
NRBC # BLD: 0 /100 WBCS — SIGNIFICANT CHANGE UP (ref 0–0)
PLATELET # BLD AUTO: 384 K/UL — SIGNIFICANT CHANGE UP (ref 150–400)
POTASSIUM SERPL-MCNC: 4 MMOL/L — SIGNIFICANT CHANGE UP (ref 3.5–5.3)
POTASSIUM SERPL-SCNC: 4 MMOL/L — SIGNIFICANT CHANGE UP (ref 3.5–5.3)
PROTHROM AB SERPL-ACNC: 12.8 SEC — SIGNIFICANT CHANGE UP (ref 10.5–13.4)
RBC # BLD: 4.23 M/UL — SIGNIFICANT CHANGE UP (ref 3.8–5.2)
RBC # FLD: 15.6 % — HIGH (ref 10.3–14.5)
SODIUM SERPL-SCNC: 136 MMOL/L — SIGNIFICANT CHANGE UP (ref 135–145)
WBC # BLD: 8.73 K/UL — SIGNIFICANT CHANGE UP (ref 3.8–10.5)
WBC # FLD AUTO: 8.73 K/UL — SIGNIFICANT CHANGE UP (ref 3.8–10.5)

## 2023-06-27 PROCEDURE — 93010 ELECTROCARDIOGRAM REPORT: CPT

## 2023-06-27 PROCEDURE — 99233 SBSQ HOSP IP/OBS HIGH 50: CPT | Mod: GC

## 2023-06-27 PROCEDURE — 99233 SBSQ HOSP IP/OBS HIGH 50: CPT

## 2023-06-27 RX ORDER — ONDANSETRON 8 MG/1
4 TABLET, FILM COATED ORAL ONCE
Refills: 0 | Status: COMPLETED | OUTPATIENT
Start: 2023-06-27 | End: 2023-06-27

## 2023-06-27 RX ORDER — ATORVASTATIN CALCIUM 80 MG/1
40 TABLET, FILM COATED ORAL AT BEDTIME
Refills: 0 | Status: DISCONTINUED | OUTPATIENT
Start: 2023-06-27 | End: 2023-06-29

## 2023-06-27 RX ORDER — CHLORHEXIDINE GLUCONATE 213 G/1000ML
1 SOLUTION TOPICAL
Refills: 0 | Status: DISCONTINUED | OUTPATIENT
Start: 2023-06-27 | End: 2023-06-29

## 2023-06-27 RX ORDER — MAGNESIUM SULFATE 500 MG/ML
2 VIAL (ML) INJECTION ONCE
Refills: 0 | Status: COMPLETED | OUTPATIENT
Start: 2023-06-27 | End: 2023-06-27

## 2023-06-27 RX ORDER — HYDROMORPHONE HYDROCHLORIDE 2 MG/ML
0.5 INJECTION INTRAMUSCULAR; INTRAVENOUS; SUBCUTANEOUS ONCE
Refills: 0 | Status: DISCONTINUED | OUTPATIENT
Start: 2023-06-27 | End: 2023-06-27

## 2023-06-27 RX ADMIN — HYDROMORPHONE HYDROCHLORIDE 0.5 MILLIGRAM(S): 2 INJECTION INTRAMUSCULAR; INTRAVENOUS; SUBCUTANEOUS at 00:38

## 2023-06-27 RX ADMIN — OXYCODONE HYDROCHLORIDE 10 MILLIGRAM(S): 5 TABLET ORAL at 18:28

## 2023-06-27 RX ADMIN — Medication 30 UNIT(S): at 17:43

## 2023-06-27 RX ADMIN — INSULIN GLARGINE 50 UNIT(S): 100 INJECTION, SOLUTION SUBCUTANEOUS at 06:33

## 2023-06-27 RX ADMIN — ONDANSETRON 4 MILLIGRAM(S): 8 TABLET, FILM COATED ORAL at 23:10

## 2023-06-27 RX ADMIN — Medication 650 MILLIGRAM(S): at 22:47

## 2023-06-27 RX ADMIN — Medication 4: at 11:55

## 2023-06-27 RX ADMIN — Medication 10: at 06:34

## 2023-06-27 RX ADMIN — Medication 5 MILLIGRAM(S): at 06:17

## 2023-06-27 RX ADMIN — Medication 50 MILLIGRAM(S): at 06:17

## 2023-06-27 RX ADMIN — Medication 25 GRAM(S): at 15:40

## 2023-06-27 RX ADMIN — Medication 4: at 17:42

## 2023-06-27 RX ADMIN — INSULIN GLARGINE 50 UNIT(S): 100 INJECTION, SOLUTION SUBCUTANEOUS at 21:49

## 2023-06-27 RX ADMIN — CHLORHEXIDINE GLUCONATE 1 APPLICATION(S): 213 SOLUTION TOPICAL at 15:39

## 2023-06-27 RX ADMIN — CLOPIDOGREL BISULFATE 75 MILLIGRAM(S): 75 TABLET, FILM COATED ORAL at 11:56

## 2023-06-27 RX ADMIN — GABAPENTIN 600 MILLIGRAM(S): 400 CAPSULE ORAL at 13:08

## 2023-06-27 RX ADMIN — Medication 1 TABLET(S): at 11:56

## 2023-06-27 RX ADMIN — APIXABAN 5 MILLIGRAM(S): 2.5 TABLET, FILM COATED ORAL at 11:56

## 2023-06-27 RX ADMIN — GABAPENTIN 600 MILLIGRAM(S): 400 CAPSULE ORAL at 21:48

## 2023-06-27 RX ADMIN — ATORVASTATIN CALCIUM 40 MILLIGRAM(S): 80 TABLET, FILM COATED ORAL at 23:19

## 2023-06-27 RX ADMIN — HYDROMORPHONE HYDROCHLORIDE 0.5 MILLIGRAM(S): 2 INJECTION INTRAMUSCULAR; INTRAVENOUS; SUBCUTANEOUS at 03:53

## 2023-06-27 RX ADMIN — GABAPENTIN 600 MILLIGRAM(S): 400 CAPSULE ORAL at 06:17

## 2023-06-27 RX ADMIN — Medication 30 UNIT(S): at 11:55

## 2023-06-27 RX ADMIN — Medication 5 MILLIGRAM(S): at 13:08

## 2023-06-27 RX ADMIN — Medication 25 GRAM(S): at 13:08

## 2023-06-27 RX ADMIN — OXYCODONE HYDROCHLORIDE 10 MILLIGRAM(S): 5 TABLET ORAL at 00:30

## 2023-06-27 RX ADMIN — Medication 650 MILLIGRAM(S): at 21:47

## 2023-06-27 RX ADMIN — APIXABAN 5 MILLIGRAM(S): 2.5 TABLET, FILM COATED ORAL at 17:43

## 2023-06-27 RX ADMIN — Medication 5 MILLIGRAM(S): at 21:48

## 2023-06-27 RX ADMIN — OXYCODONE HYDROCHLORIDE 10 MILLIGRAM(S): 5 TABLET ORAL at 06:17

## 2023-06-27 NOTE — PHYSICAL THERAPY INITIAL EVALUATION ADULT - PERTINENT HX OF CURRENT PROBLEM, REHAB EVAL
42 y/o female with PMHx Asthma, CVA (11/2021; residual L>R weakness), PE (4/2023 on Eliquis), uncontrolled DM-2 (on insulin), HTN, HLD, hx abdominal necrotizing fasciitis (s/p suprapubic catheter, ostomy placement in 11/2021 with intubation from 11-12/2021), hx of frequent UTIs 2/2 to suprapubic catheter, most recent UTI c/b Urosepsis admitted [facility] 5/23-6/1 (Klebsiella and Ecoli, dc'd with left midline for tx w/ abx)  who presents to [facility] ED on 6/20/23, complaining of abdominal pain, nausea, chest pressure, SOB, blurry vision, and anorexia x3 days.

## 2023-06-27 NOTE — PROGRESS NOTE ADULT - ASSESSMENT
44yo woman w/ hx asthma, hx PE on home Eliquis, DM, HTN, HLD, hx abdominal necrotizing fasciitis s/p suprapubic catheter, ostomy placement in 11/2021 who presented to [facility] ED from home with worsening abdominal and suprapubic pain and drainage from prior suprapubic catheter insertion site, recent admission 5/23-6/1 for sepsis 2/2 UTI and catheter-site infection on gentamicin and ertapenem, who presents for acute chest pain w/ elevated troponins, medicine team consulted for management of severe sepsis and hyperglycemia.    Plan/Recommendations:  #R/O Sepsis  On prior admission 5-23-6/1 pt had presented with worsening lower abd pain, drainage from suprapubic catheter site noted in setting of elevated lactate 5.3 in setting of severe sepsis 2/2 UTI from suprapubic catheter. At that time, UCx (5/22) growing carbapenem-resistant Klebsiella & E. Coli. Blood cultures (5/23) NGTD. S/p suprapubic catheter replacement by IR on 5/25 and since treated w/ course gentamicin and ertapenem via PICC on discharge. On current admission, patient presenting with persistent pain symptoms, lactate 7.3 on admission improved w/ IVF since elevated again back up to 7.4.  - agree w/ trial off antibiotics per ID recs  - f/u BCx x2 and UCx x1  - appreciate ID recs    #Poorly-controlled IDDM  A1c 11.9, noted on admission for FSGs in 500s.  - Continue lispro 10 units before each meal.  - mISS  - appreciate endocrine recs    #Hypomagnesia  serum Mg 1.5 yesterday 6/25  - please give IV magnesium sulfate 2g x1, replete to goal serum Mg >2.0    #R/O NSTEMI  Patient presenting w/ acute substernal chest pain w/ mildly elevated high sensitivity troponins mid 50s. Otherwise no ST changes noted on ECG.  - agree with admission to cardiac telemetry for possible ischemic eval  - CCTA showing moderate stenosis proximal RCA, pending cardiac cath today 6/26    #Suprapubic/Abd pain  - agree with IR consult to evaluate removal of suprapubic catheter per patient's wishes  - agree w/ oxybutynin 5mg TID PRN for bladder spasms  - c/w prior pain regimen: Tylenol 975mg q8hrs, Ibuprofen 200mg q6hrs, Oxycodone IR PRN (moderate 5mg q6h, severe 10mg q4h), lidocaine patch for R midback paraspinal tenderness  - c/w PO gabapentin 500mg TID  - based on [facility] record, can uptitrate oxycodone to 10mg IR BID    #Hx necrotizing fasciitis s/p ostomy  Hx of necrotizing fasciitis tx at [facility] in 11/2021 with hospital course c/b bowel resection and ostomy formation. On ROS, no increased output from ostomy bag and site appears clean/dry.  - Routine ostomy care  - Monitor ostomy output  - General surgery outpatient appointment for possible ostomy reversal.    #HTN  Home medications include Labetalol 100 mg BID and Losartan 50mg Q24.  - c/w home BP meds    #Hx Pulmonary embolism  History of bilateral pulmonary emboli in segmental and subsegmental branches on the R and subsegmental branches on the L, diagnosed on CT chest during ED visit on 4/7/23. Home meds: eliquis 5mg BID  - c/w full dose AC  - transition to home PO eliquis 5mg BID when appropriate per primary team    #Asthma  - Continue home Ventolin Q24 PRN 44yo woman w/ hx asthma, hx PE on home Eliquis, DM, HTN, HLD, hx abdominal necrotizing fasciitis s/p suprapubic catheter, ostomy placement in 11/2021 who presented to [facility] ED from home with worsening abdominal and suprapubic pain and drainage from prior suprapubic catheter insertion site, recent admission 5/23-6/1 for sepsis 2/2 UTI and catheter-site infection on gentamicin and ertapenem, who presents for acute chest pain w/ elevated troponins, medicine team consulted for management of severe sepsis and hyperglycemia.    Plan/Recommendations:  #R/O Sepsis  On prior admission 5-23-6/1 pt had presented with worsening lower abd pain, drainage from suprapubic catheter site noted in setting of elevated lactate 5.3 in setting of severe sepsis 2/2 UTI from suprapubic catheter. At that time, UCx (5/22) growing carbapenem-resistant Klebsiella & E. Coli. Blood cultures (5/23) NGTD. S/p suprapubic catheter replacement by IR on 5/25 and since treated w/ course gentamicin and ertapenem via PICC on discharge. On current admission, patient presenting with persistent pain symptoms, lactate 7.3 on admission improved w/ IVF since elevated again back up to 7.4.  - agree w/ trial off antibiotics per ID recs  - f/u BCx x2 and UCx x1  - appreciate ID recs    #Poorly-controlled IDDM  A1c 11.9, noted on admission for FSGs in 500s.  - Continue lispro 10 units before each meal.  - mISS  - appreciate endocrine recs    #R/O NSTEMI  Patient presenting w/ acute substernal chest pain w/ mildly elevated high sensitivity troponins mid 50s. Otherwise no ST changes noted on ECG.  - agree with admission to cardiac telemetry for possible ischemic eval  - CCTA showing moderate stenosis proximal RCA, underwent cardiac cath 6/26    #Suprapubic/Abd pain  - agree with IR consult to evaluate removal of suprapubic catheter per patient's wishes  - agree w/ oxybutynin 5mg TID PRN for bladder spasms  - c/w prior pain regimen: Tylenol 975mg q8hrs, Ibuprofen 200mg q6hrs, Oxycodone IR PRN (moderate 5mg q6h, severe 10mg q4h), lidocaine patch for R midback paraspinal tenderness  - c/w PO gabapentin 500mg TID  - c/w oxycodone to 10mg IR BID    #Hx necrotizing fasciitis s/p ostomy  Hx of necrotizing fasciitis tx at [facility] in 11/2021 with hospital course c/b bowel resection and ostomy formation. On ROS, no increased output from ostomy bag and site appears clean/dry.  - Routine ostomy care  - Monitor ostomy output  - General surgery outpatient appointment for possible ostomy reversal.    #HTN  Home medications include Labetalol 100 mg BID and Losartan 50mg Q24.  - c/w home BP meds    #Hx Pulmonary embolism  History of bilateral pulmonary emboli in segmental and subsegmental branches on the R and subsegmental branches on the L, diagnosed on CT chest during ED visit on 4/7/23. Home meds: eliquis 5mg BID  - c/w full dose AC  - transition to home PO eliquis 5mg BID when appropriate per primary team    #Asthma  - Continue home Ventolin Q24 PRN

## 2023-06-27 NOTE — PROGRESS NOTE ADULT - ASSESSMENT
43F w/ contrast allergy and PMHx of HTN, HLD, poorly controlled DM-II, CVA (11/2021, residual LE weakness L>R), PE 4/2023 (on Eliquis), Asthma, abd necrotizing fasciitis s/p suprapubic catheter and ostomy in place (11/2021, requiring intubation at that time), and frequent UTIs 2/2 suprapubic catheter w/ recent hospitalization for Urosepsis 5/23-6/1 (Klebsiella and EColi, was discharged w/ L Midline for IV abx), returns to [facility] ED 6/20 c/o abd pain, nausea, CP and SOB, found to be hyperglycemic w/ -600s, hypokalemic and elevated troponin, pt now admitted to cardiac tele for r/o ACS. Pt found to have abnormal CCTA, pending cardiac cath 6/26. Medicine and ID consulted for SIRS, currently off abx and stable. Endo following for poorly controlled DM. 43F w/ contrast allergy and PMHx of HTN, HLD, poorly controlled DM-II, CVA (11/2021, residual LE weakness L>R), PE 4/2023 (on Eliquis), Asthma, abd necrotizing fasciitis s/p suprapubic catheter and ostomy in place (11/2021, requiring intubation at that time), and frequent UTIs 2/2 suprapubic catheter w/ recent hospitalization for Urosepsis 5/23-6/1 (Klebsiella and EColi, was discharged w/ L Midline for IV abx), returns to [facility] ED 6/20 c/o abd pain, nausea, CP and SOB, found to be hyperglycemic w/ -600s, hypokalemic and elevated troponin, pt now admitted to cardiac tele for r/o ACS. Pt found to have abnormal CCTA. Medicine and ID consulted for SIRS, currently off abx and stable. Endo following for poorly controlled DM. Pt underwent cardiac catheterization 06/26/23 w/ NAT x 1 pRCA (85%) and NAT x 1 RPLS (80%); residual dLCx/OM1 70%, mLAD 50%.

## 2023-06-27 NOTE — OCCUPATIONAL THERAPY INITIAL EVALUATION ADULT - OTHER, REHAB EVAL
Pt w/ old CVA and h/o residual LLE weakness w/ drop foot; +splint(able to don and doff orthosis independently)

## 2023-06-27 NOTE — PROGRESS NOTE ADULT - PROBLEM SELECTOR PLAN 11
- Mg 1.5. Ordered 2 mg IV to be given STAT and another dose Mg 2 mg IV two hrs after first order   - Recheck Mg level @ 18:00 pm  - Keep K >4, Mg >2

## 2023-06-27 NOTE — OCCUPATIONAL THERAPY INITIAL EVALUATION ADULT - GENERAL OBSERVATIONS, REHAB EVAL
Pt's RN Joanne aware of intent to eval/tx; cleared Pt. PT Arelis SELBY present. Pt received in supine - +telemetry, heplock, suprapubic cath. Pt agreeable to OT.

## 2023-06-27 NOTE — OCCUPATIONAL THERAPY INITIAL EVALUATION ADULT - PLANNED THERAPY INTERVENTIONS, OT EVAL
ADL retraining/IADL retraining/balance training/bed mobility training/cognitive, visual perceptual/fine motor coordination training/motor coordination training/neuromuscular re-education/orthotic fitting/training/parent/caregiver training.../ROM/strengthening/transfer training

## 2023-06-27 NOTE — PHYSICAL THERAPY INITIAL EVALUATION ADULT - MANUAL MUSCLE TESTING RESULTS, REHAB EVAL
R shoulder flexion 3+/5, R elbow flexion/extension 4-/5, L shoulder flexion 2-/5, L elbow flexion/extension 3+/5, RLE 3+/5, L hip flexion 3+/5, L knee extension 3+/5, L ankle 1+/5 drop foot secondary to past CVA

## 2023-06-27 NOTE — PROGRESS NOTE ADULT - PROBLEM SELECTOR PLAN 9
Prior admission 5/22-6/1 for UTI of suprapubic catheter,  ESBL Klebsiella  -UA/UCxs as above, per ID will monitor off abx. If pt becomes febrile can trial Tigecycline 100mg IV x 1 > 50mg BID (12hr after loading dose)  -CT abd findings as above  -ID recommending removal of suprapubic catheter, however pt currently refusing due to immobility  -Continue Oxybutinin 5mg TID  - Patient requiring bedside commode on discharge Prior admission 5/22-6/1 for UTI of suprapubic catheter,  ESBL Klebsiella  -UA/UCxs as above, per ID will monitor off abx. If pt becomes febrile can trial Tigecycline 100mg IV x 1 > 50mg BID (12hr after loading dose)  -CT abd findings as above  -ID recommending removal of suprapubic catheter, pt now agreeable. Called IR awaiting recs.  -Continue Oxybutinin 5mg TID  - Patient requiring bedside commode on discharge Prior admission 5/22-6/1 for UTI of suprapubic catheter,  ESBL Klebsiella  -UA/UCxs as above, per ID will monitor off abx. If pt becomes febrile can trial Tigecycline 100mg IV x 1 > 50mg BID (12hr after loading dose)  -CT abd findings as above  -ID recommending removal of suprapubic catheter, pt now agreeable. Called IR whom is coming to evaluate pt.   -Continue Oxybutinin 5mg TID  - Patient requiring bedside commode on discharge Prior admission 5/22-6/1 for UTI of suprapubic catheter,  ESBL Klebsiella.   - UA/UCxs as above, per ID will monitor off abx. If pt becomes febrile can trial Tigecycline 100mg IV x 1 > 50mg BID (12hr after loading dose)  - CT abd findings as above  - ID recommending removal of suprapubic catheter, pt was refusing but was agreeable today (06/27/23). However once IR physician at bedside to remove catheter, pt raising concern that she needs IV pain medications. Offered IV narcotic joey removal of catheter however pt requesting only a higher dose. To be discussed with medicine team to help direct pain regimen   - Continue Oxycodone 10 mg po BID per medicine  - On discharge, pt requires bedside commode and lifted toilet seat.

## 2023-06-27 NOTE — PROGRESS NOTE ADULT - PROBLEM SELECTOR PLAN 12
-pt endorsing 11/10 generalized pain and likely pain seeking. Pt demanding Dilaudid 1mg IV Q6 and states it is prescribed in outpt, however confirmed w/ outpt pharmacy and pt only prescribed Oxycodone 5mg Q6 PRN  -Medicine co-managing, recommending outpt pain management f/u  -Reached out to pain management, does not see pts in hospital. Also, discussed case with palliative care whom also does not handle pain management  -Per [facility], pt received oxycodone 10 mg dose, 60 pill quantity on 12/01/22; Will augment oxycodone 10 mg BID for now  -C/w Tylenol 650mg Q8 PRN and above Gabapentin  -Psych evaluation recommended given drug seeking behavior-- however pt adamantly refused    Fluids: Oral  Diet: DASH  Activity: Ambulate  DVT ppx: eliquis 5 mg BID  Dispo: Cardiac tele -pt endorsing 11/10 generalized pain and likely pain seeking. Pt demanding Dilaudid 1mg IV Q6 and states it is prescribed in outpt, however confirmed w/ outpt pharmacy and pt only prescribed Oxycodone 5mg Q6 PRN  -Medicine co-managing, recommending outpt pain management f/u  -Reached out to pain management, does not see pts in hospital. Also, discussed case with palliative care whom also does not handle pain management  -Per [facility], pt received oxycodone 10 mg dose, 60 pill quantity on 12/01/22; Will augment oxycodone 10 mg BID for now  -C/w Tylenol 650mg Q8 PRN and above Gabapentin  -Psych evaluation recommended given drug seeking behavior-- however pt adamantly refused    Fluids: Oral  Diet: DASH  E: Keep k >4, Mg >2  Activity: Ambulate  DVT ppx: eliquis 5 mg BID  Dispo: Cardiac tele, plan for dispo tomorrow (06/28/23)

## 2023-06-27 NOTE — PHYSICAL THERAPY INITIAL EVALUATION ADULT - FOLLOWS COMMANDS/ANSWERS QUESTIONS, REHAB EVAL
100% of the time however requires redirection at times secondary to presenting emotionally labile/100% of the time

## 2023-06-27 NOTE — PROGRESS NOTE ADULT - SUBJECTIVE AND OBJECTIVE BOX
SUBJECTIVE/OVERNIGHT EVENTS: No acute overnight events. Pt seen in AM at bedside, resting comfortably in bed, and does not appear to be in any acute distress. When asked, pt denies any recent or active fever, chills, nausea, vomiting, headache, acute sob, chest pain, abdominal pain, genitourinary sx, extremity pain or swelling.    VITAL SIGNS:  Vital Signs Last 24 Hrs  T(C): 36.8 (27 Jun 2023 09:19), Max: 36.8 (27 Jun 2023 09:19)  T(F): 98.3 (27 Jun 2023 09:19), Max: 98.3 (27 Jun 2023 09:19)  HR: 100 (27 Jun 2023 08:50) (84 - 109)  BP: 151/59 (27 Jun 2023 08:50) (104/50 - 151/59)  BP(mean): 83 (27 Jun 2023 08:50) (71 - 86)  RR: 17 (27 Jun 2023 08:50) (17 - 18)  SpO2: 94% (27 Jun 2023 08:50) (94% - 98%)    Parameters below as of 27 Jun 2023 08:50  Patient On (Oxygen Delivery Method): room air        PHYSICAL EXAM:  General: NAD, resting in bed  HEENT: MMM  Neck: supple  Cardiovascular: +S1/S2; RRR  Respiratory: CTA B/L; no W/R/R  Gastrointestinal: soft, ND, +BS, ostomy bag in place, clean well-appearing surrounding skin, no output in bag, clean dry umbilicus, tenderness to palpation but out of proportion with exam and patient appears comfortable despite exclamation.   Back: tenderness to palpation of R paraspinal region of midback  Extremities: WWP; no edema, clubbing or cyanosis; diffusely tender/sensitive to palpation  Vascular: 2+ radial, DP/PT pulses B/L  Neuro: AOx3    MEDICATIONS:  MEDICATIONS  (STANDING):  apixaban 5 milliGRAM(s) Oral every 12 hours  atorvastatin 20 milliGRAM(s) Oral at bedtime  clopidogrel Tablet 75 milliGRAM(s) Oral daily  dextrose 5%. 1000 milliLiter(s) (100 mL/Hr) IV Continuous <Continuous>  dextrose 5%. 1000 milliLiter(s) (50 mL/Hr) IV Continuous <Continuous>  dextrose 50% Injectable 12.5 Gram(s) IV Push once  dextrose 50% Injectable 25 Gram(s) IV Push once  dextrose 50% Injectable 25 Gram(s) IV Push once  gabapentin 600 milliGRAM(s) Oral three times a day  glucagon  Injectable 1 milliGRAM(s) IntraMuscular once  insulin glargine Injectable (LANTUS) 50 Unit(s) SubCutaneous <User Schedule>  insulin lispro (ADMELOG) corrective regimen sliding scale   SubCutaneous Before meals and at bedtime  insulin lispro Injectable (ADMELOG) 30 Unit(s) SubCutaneous three times a day before meals  lactobacillus acidophilus 1 Tablet(s) Oral daily  metoprolol succinate ER 50 milliGRAM(s) Oral daily  oxybutynin 5 milliGRAM(s) Oral three times a day  sodium chloride 0.9%. 1000 milliLiter(s) (250 mL/Hr) IV Continuous <Continuous>    MEDICATIONS  (PRN):  acetaminophen     Tablet .. 650 milliGRAM(s) Oral every 8 hours PRN Mild Pain (1 - 3)  albuterol    90 MICROgram(s) HFA Inhaler 2 Puff(s) Inhalation every 6 hours PRN Shortness of Breath and/or Wheezing  dextrose Oral Gel 15 Gram(s) Oral once PRN Blood Glucose LESS THAN 70 milliGRAM(s)/deciliter  oxyCODONE    IR 10 milliGRAM(s) Oral two times a day PRN Moderate Pain (4 - 6)      ALLERGIES:  Allergies    shellfish. (Hives; Anaphylaxis)  iodine containing compounds (Hives; Anaphylaxis)  fish (Hives; Urticaria)  clindamycin (Pruritus)  vancomycin (Anaphylaxis; Hives)  amoxicillin (Short breath; Rash)  penicillin (Hives)    Intolerances    Bactrim I.V. (Rash (Mod to Severe))      LABS:                        10.1   8.73  )-----------( 384      ( 27 Jun 2023 05:30 )             33.4     06-27    136  |  100  |  21  ----------------------------<  343<H>  4.0   |  24  |  1.14    Ca    7.6<L>      27 Jun 2023 05:30  Mg     1.5     06-27    TPro  7.3  /  Alb  3.4  /  TBili  <0.2  /  DBili  x   /  AST  13  /  ALT  9<L>  /  AlkPhos  126<H>  06-26    PT/INR - ( 27 Jun 2023 05:30 )   PT: 12.8 sec;   INR: 1.07          PTT - ( 27 Jun 2023 05:30 )  PTT:29.5 sec    RADIOLOGY & ADDITIONAL TESTS: Reviewed.

## 2023-06-27 NOTE — PHYSICAL THERAPY INITIAL EVALUATION ADULT - ADDITIONAL COMMENTS
As per pt, PTA she attempted to maintain functional independence after rehab however requires intermittent assistance with functional mobility, ADLs, and IADLs. Has some help from kids/family. Pt ambulates short distance with rollator, longer distances with wheelchair. Pt has a shower tub with shower chair.

## 2023-06-27 NOTE — OCCUPATIONAL THERAPY INITIAL EVALUATION ADULT - IMPAIRED TRANSFERS: SIT/STAND, REHAB EVAL
impaired coordination/decreased flexibility/impaired postural control/decreased ROM/decreased strength

## 2023-06-27 NOTE — OCCUPATIONAL THERAPY INITIAL EVALUATION ADULT - NSOTDISCHREC_GEN_A_CORE
AR. However Pt has strong preference for home d/c. If Pt were to be d/c'd home Pt would benefit from Home OT , commode and HHA assist as nec./Acute Inpatient Rehab

## 2023-06-27 NOTE — PROGRESS NOTE ADULT - PROBLEM SELECTOR PLAN 2
presents w/ Hyperglycemia, BG >550 and A1c 11.9%, Endocrinology following  -Anion gap 24 on admission, now closing at 17  -BHB 0.5--> 0.1  -BS noted to increase again to 500s on 6/23 after administration of steroids prior to CCTA for contrast allergy  -As discussed w/ Endo, i/s/o upcoming cath and solucortef, c/w Lantus 40u BID and Lispro 30u TID w/ meals  -Continue mISS QID and FS QID -presents w/ Hyperglycemia, BG >550 and A1c 11.9%, Endocrinology following  -BS noted to increase again to 500s on 6/23 after administration of steroids prior to CCTA for contrast allergy  -Received solumedrol for dye allergy prior to cath  -Lantus was increased to 50 units at bedtime and lantus 50 units AM  -Continue mISS QID and FS QID  -Will discuss final recs regarding U500 and admelog for d/c planning, dose TBD per endocrine

## 2023-06-27 NOTE — PHYSICAL THERAPY INITIAL EVALUATION ADULT - GAIT DEVIATIONS NOTED, PT EVAL
forward flexed posture, unsteady gait, decreased BLE clearance, +L AFO/decreased marce/decreased velocity of limb motion/decreased step length/decreased weight-shifting ability

## 2023-06-27 NOTE — OCCUPATIONAL THERAPY INITIAL EVALUATION ADULT - RANGE OF MOTION EXAMINATION, LOWER EXTREMITY
RLE AROM, PROM at WFL, WNL; L hip and knee AROM limited, PROM at WNL; L ankle AROM impaired; PROM n/t 2/2 Pt's c/o hypersensitivity

## 2023-06-27 NOTE — PROGRESS NOTE ADULT - PROBLEM/PLAN-12
Subjective:     Patient ID: Jez Howe is a 41 y.o. male.    History of Present Illness  The patient is a 41-year-old right-handed male with history of allergies and hemochromatosis who presents to the neurology clinic today as established patient for follow-up for possible seizures.  The patient was last seen as a new patient on April 7, 2021.  For details regarding his history, please refer to that note.  The patient reported that around 2003 he started having spells.  They initially were mostly in the morning but now they could occur later in the day.  He reports that he would hear a particular song for a couple of seconds and have some ringing.  Everything comes down his hearing would become muffled and he would get dizzy.  Sometimes the symptoms would be more severe and he would have difficulty carrying on a conversation and functioning.  It would last less than a minute and he would be fatigued afterwards for about an hour.  He denies that any have occurred at work.  He denies any while driving.  He reports that the episodes are sporadic but he averages about 1/month.  He had an MRI of the brain done in 2014 and it was reportedly normal.  At his last visit I had recommended an EEG however the patient deferred due to cost.  He also reports he has a history of a concussion when he was a teenager.  He does live with his wife and she has not noticed any symptoms.  She denies any nocturnal spells.  Overall he thinks they are about the same.  Sometimes he has episodes where takes longer for him to come back to.  He always is aware.  He works as an  and reports that he historically has a bad memory and this has been going on for about 15 years.  His last episode was a month before.    The following portions of the patient's history were reviewed and updated as appropriate: allergies, current medications, past family history, past medical history, past social history, past surgical history and problem 
list.    Review of Systems     Objective:    Neurological Exam    Physical Exam    Assessment/Plan:    The patient is a 41-year-old right-handed male with history of allergies and hemochromatosis who presents today for follow-up.    1.  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  We decided to start lamotrigine.  We discussed the rare but potential serious side effect of Andrews-Mauricio syndrome.  We will also get him set up with an EEG.  We will see the patient back in 10 weeks or sooner if needed.    A total of 40 minutes of time was spent on encounter today.  This includes reviewing the patient's records, face-to-face time, documentation.       Problems Addressed this Visit    None  Visit Diagnoses     Transient alteration of awareness    -  Primary    Relevant Orders    EEG      Diagnoses       Codes Comments    Transient alteration of awareness    -  Primary ICD-10-CM: R40.4  ICD-9-CM: 780.02                    
DISPLAY PLAN FREE TEXT
DISPLAY PLAN FREE TEXT

## 2023-06-27 NOTE — PROGRESS NOTE ADULT - ASSESSMENT
43F w/ asthma, PE (on eliquis), HTN, DM, HLD, abdominal nec fasc (s/p suprapubic catheter and ostomy on Nov 2021), and recent admission to [facility] 5/22-6/1 for UTI, presenting with left sided chest pain for 1 day, found to have negative cardiac work-up, persistent hyperglycemia, ICU consulted for transfer to medical telemetry.    A1C: 11.9 %  BUN: 21  Creatinine: 1.14  GFR: 61  Weight: 101.2  BMI: 34.9 43F w/ asthma, PE (on eliquis), HTN, DM, HLD, abdominal nec fasc (s/p suprapubic catheter and ostomy on Nov 2021), and recent admission to [facility] 5/22-6/1 for UTI, presenting with left sided chest pain for 1 day, found to have negative cardiac work-up, persistent hyperglycemia, now s/p cardiac cath with stent placed to pRCA and RPLS on 6/26 and planned to return for staged PCI of LCx/OM1 (70%) if symptomatic.     Contrast allergy requiring premedication with steroids.    A1C: 11.9 %  BUN: 21  Creatinine: 1.14  GFR: 61  Weight: 101.2  BMI: 34.9

## 2023-06-27 NOTE — PROGRESS NOTE ADULT - ATTENDING COMMENTS
Patient's hospital course reviewed, evaluated earlier this morning.   Patient reports chest pain and SOB after LHC, resolved this morning, Denies abdominal pain, no N.V, no pain with suprapubic catheter. She is reporting that she has chronic pain, all over her body, Oxycodone has been helping, she is interested in pain management as outpatient. Reports wanting to be discharged as eager to be with her 4 children at home. Wants suprapubic catheter removed prior to discharge. Denies any other complaints, no events reported.    General: AOX3, NAD, sitting in bed, speaking in full sentences, no labored breathing on RA  HEENT: AT/NC, no facial asymmetry   Lungs: no crackles, no wheezes  Heart: RRR  Abdomen: suprapubic cath, colostomy, soft, non-tender, no guarding, + BS  Extremities: warm, no edema, no tenderness, no calf tenderness, reports chronic left leg weakness, no gross motor deficit. Right wrist, + pulses, no hematoma, sensation intact, warm digits,     Labs, imaging reviewed    Sepsis  NSTEMI  Uncontrolled DM   Acute kidney injury   Hypomagnesemia  History of PE  History of CVA    Patient without leucocytosis, afebrile. Off ATB, denies pain at the suprapubic catheter, IR follow-up for removal. Patient in agreement. Appreciate ID  S/P LHC, futher management per Cardiology  Remains with uncontrolled FS, no AG. Endocrinology following to adjust regimen. Would need follow-up as outpatient  Improved creatinine, continue on holding Nephrotoxics  Replete Mag, monitor   Apixaban resumed  PT evaluation, patient reports using left leg brace  Patient to benefit from pain management as outpatient. She reports current regimen of Oxycodone 10 mg q12h prn is controlling her pain, along with Gabapentin   DVT ppx AC Patient's hospital course reviewed, evaluated earlier this morning.   Patient reports chest pain and SOB after LHC, resolved this morning, Denies abdominal pain, no N.V, no pain with suprapubic catheter. She is reporting that she has chronic pain, all over her body, Oxycodone has been helping, she is interested in pain management as outpatient. Reports wanting to be discharged as eager to be with her 4 children at home. Wants suprapubic catheter removed prior to discharge. Denies any other complaints, no events reported.    General: AOX3, NAD, sitting in bed, speaking in full sentences, no labored breathing on RA  HEENT: AT/NC, no facial asymmetry   Lungs: no crackles, no wheezes  Heart: RRR  Abdomen: suprapubic cath, colostomy, soft, non-tender, no guarding, + BS  Extremities: warm, no edema, no tenderness, no calf tenderness, reports chronic left leg weakness, no gross motor deficit. Right wrist, + pulses, no hematoma, sensation intact, warm digits,     Labs, imaging reviewed    Sepsis  NSTEMI  Uncontrolled DM   Acute kidney injury   Hypomagnesemia  History of PE  History of CVA    Patient without leucocytosis, afebrile. Off ATB, denies pain at the suprapubic catheter, IR follow-up for removal. Patient in agreement. Appreciate ID  S/P LHC, futher management per Cardiology  Remains with uncontrolled FS, no AG. Endocrinology following to adjust regimen. Would need follow-up as outpatient  Improved creatinine, continue on holding Nephrotoxics  Replete Mag, monitor   Apixaban resumed  PT evaluation, patient reports using left leg brace  Patient to benefit from pain management as outpatient. She reports chronic pain all over her body. She reports current regimen of Oxycodone 10 mg q12h prn is controlling her pain, along with Gabapentin   DVT ppx AC

## 2023-06-27 NOTE — OCCUPATIONAL THERAPY INITIAL EVALUATION ADULT - PERTINENT HX OF CURRENT PROBLEM, REHAB EVAL
44 y/o female with PMHx Asthma, CVA (11/2021; residual L>R weakness), PE (4/2023 on Eliquis), uncontrolled DM-2 (on insulin), HTN, HLD, hx abdominal necrotizing fasciitis (s/p suprapubic catheter, ostomy placement in 11/2021 with intubation from 11-12/2021), hx of frequent UTIs 2/2 to suprapubic catheter, most recent UTI c/b Urosepsis admitted [id number] 5/23-6/1 (Klebsiella and Ecoli, dc'd with left midline for tx w/ abx)  who presents to [facility] ED on 6/20/23, complaining of abdominal pain, nausea, chest pressure, SOB, blurry vision, and anorexia x3 days, found to be hyperglycemic to 600s, admitted to cardiology for R/o ACS.

## 2023-06-27 NOTE — OCCUPATIONAL THERAPY INITIAL EVALUATION ADULT - MD ORDER
C/O  Abdominal pain, nausea, chest pressure, SOB, blurry vision  Systemic inflammatory response syndrome (SIRS)  R/O ACS  H/O CVA w/ R<>L BUE and BLE weakness

## 2023-06-27 NOTE — PROGRESS NOTE ADULT - PROBLEM SELECTOR PLAN 1
- Increase Lantus to 50 units at bedtime and Lantus 50 units this AM.   - Continue lispro 30  units before each meal.  - Continue lispro moderate dose sliding scale before meals and at bedtime.  - Patient's fingerstick glucose goal is 100-180 mg/dL.    - For discharge, patient can U500 and admelog, dose TBD.  - Patient can follow up at discharge with  [facility] by calling [phone] to make an appointment.      Case discussed with Dr. Rose. Primary team updated.

## 2023-06-27 NOTE — PROGRESS NOTE ADULT - PROBLEM SELECTOR PLAN 10
Persistent L>R UE and LE weakness and paresthesias  -Increase Gabapentin to 500mg TID and uptitrate to 600mg TID in AM  (if Cr remains stable)      #Chronic back pain  -pt endorsing 11/10 generalized pain and likely pain seeking. Pt demanding Dilaudid 1mg IV Q6 and states it is prescribed in outpt, however confirmed w/ outpt pharmacy and pt only prescribed Oxycodone 5mg Q6 PRN  -Medicine co-managing, recommending outpt pain management f/u  -Reached out to pain management, does not see pts in hospital. Also, discussed case with palliative care whom also does not handle pain management  -Per [facility], pt received oxycodone 10 mg dose, 60 pill quantity on 12/01/22; Will augment oxycodone 10 mg BID for now  -C/w Tylenol 650mg Q8 PRN and above Gabapentin  -Psych evaluation recommended given drug seeking behavior-- however pt adamantly refused     F: None  E: Replete if K<4 or Mag<2  N: DASH Diet  VTEppx: Heparin gtt  Dispo: Cardiac tele Persistent L>R UE and LE weakness and paresthesias  -c/w gabapentin 600mg TID in AM    F: None  E: Replete if K<4 or Mag<2  N: DASH Diet  VTEppx: Eliquis 5 mg BID  Dispo: Cardiac tele Persistent L>R UE and LE weakness and paresthesias  -c/w gabapentin 600mg TID in AM

## 2023-06-27 NOTE — OCCUPATIONAL THERAPY INITIAL EVALUATION ADULT - MODIFIED CLINICAL TEST OF SENSORY INTEGRATION IN BALANCE TEST
Pt took ~20steps w/ rollator and Min Ax2 - +slow unsteady in steps w/ flexed posture, increased time nec

## 2023-06-27 NOTE — PROGRESS NOTE ADULT - PROBLEM SELECTOR PLAN 1
Patient presents w/ substernal chest tightness at rest, currently CP free and HD stable  - HsTrop T 55->52-->32  - EKG: NSR, non ischemic  - TTE 6/21: hyperdynamic LVEF, mild LVH, no valvular disease  -CCTA 6/23: Ca score 129 (accuracy of score is limited by image noise), probable severe pRCA stenosis, mod D2 stenosis, non obstructive in remaining coronaries  - NPO after MN for cardiac cath in AM, pt consented  -  Pre cath load: s/p ASA 325mg in ED and given Plavix 600mg in AM prior to cath  -  Pre cath IVF hydration: NS 250cc bolus followed by maintenance 75cc/hr x 2hrs  -  Premedication for contrast allergy: Solucortef 200mg IVP x 1 and Benadryl 50mg IVP pre and post cath   -Continue ASA 81mg QD, Lipitor 40mg HS and Toprol 50mg QD Patient presents w/ substernal chest tightness at rest, currently CP free and HD stable  - HsTrop T 55->52-->32  - EKG: NSR, non ischemic  - TTE 6/21: hyperdynamic LVEF, mild LVH, no valvular disease  -CCTA 6/23: Ca score 129 (accuracy of score is limited by image noise), probable severe pRCA stenosis, mod D2 stenosis, non obstructive in remaining coronaries  - Cardiac catheterization 06/26/23: : IVUS guided NAT pRCA (85%) and NAT RPLS (80%); residual dLCx/OM1 70%, mLAD 50%.   -Continue eliquis 5 mg BID and plavix 75 mg po qd  -C/w atorvastatin 40 mg po daily and toprol XL 50 mg po daily Patient presents w/ substernal chest tightness at rest, currently CP free and HD stable  - HsTrop T 55->52-->32  - EKG: NSR, non ischemic  - TTE 6/21: hyperdynamic LVEF, mild LVH, no valvular disease  -CCTA 6/23: Ca score 129 (accuracy of score is limited by image noise), probable severe pRCA stenosis, mod D2 stenosis, non obstructive in remaining coronaries  - Cardiac catheterization 06/26/23: : IVUS guided NAT pRCA (85%) and NAT RPLS (80%); residual dLCx/OM1 70%, mLAD 50%.   -Continue eliquis 5 mg BID and plavix 75 mg po qd. Instructed pt importance of continuing this medication uninterrupted.  -C/w atorvastatin 40 mg po daily and toprol XL 50 mg po daily Patient presents w/ substernal chest tightness at rest, currently CP free and HD stable  - HsTrop T 55->52-->32  - EKG: NSR, non ischemic  - TTE 6/21: hyperdynamic LVEF, mild LVH, no valvular disease  -CCTA 6/23: Ca score 129 (accuracy of score is limited by image noise), probable severe pRCA stenosis, mod D2 stenosis, non obstructive in remaining coronaries  - Cardiac catheterization 06/26/23: : IVUS guided NAT pRCA (85%) and NAT RPLS (80%); residual dLCx/OM1 70%, mLAD 50%.   -Continue eliquis 5 mg BID and plavix 75 mg po qd. Instructed pt importance of continuing this medication uninterrupted with coronary intervention.   -C/w atorvastatin 40 mg po daily and toprol XL 50 mg po daily

## 2023-06-27 NOTE — PHYSICAL THERAPY INITIAL EVALUATION ADULT - NSPTDISCHREC_GEN_A_CORE
[place] informed; Should patient return home requires HPT and family assist/Acute Inpatient Rehab

## 2023-06-27 NOTE — PROGRESS NOTE ADULT - SUBJECTIVE AND OBJECTIVE BOX
Cardiology PA Adult Progress Note    SUBJECTIVE ASSESSMENT: Pt seen and examined today. Pt is now agreeable to removal of suprapubic catheter if that is best for her care. Pt requests that she have commode and ankle brace on left foot on d/c, as well as PT inpatient to help regain strength.   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   	  MEDICATIONS:  metoprolol succinate ER 50 milliGRAM(s) Oral daily  albuterol    90 MICROgram(s) HFA Inhaler 2 Puff(s) Inhalation every 6 hours PRN  acetaminophen     Tablet .. 650 milliGRAM(s) Oral every 8 hours PRN  gabapentin 600 milliGRAM(s) Oral three times a day  oxyCODONE    IR 10 milliGRAM(s) Oral two times a day PRN  atorvastatin 20 milliGRAM(s) Oral at bedtime  dextrose 50% Injectable 12.5 Gram(s) IV Push once  dextrose 50% Injectable 25 Gram(s) IV Push once  dextrose 50% Injectable 25 Gram(s) IV Push once  dextrose Oral Gel 15 Gram(s) Oral once PRN  glucagon  Injectable 1 milliGRAM(s) IntraMuscular once  insulin glargine Injectable (LANTUS) 50 Unit(s) SubCutaneous <User Schedule>  insulin lispro (ADMELOG) corrective regimen sliding scale   SubCutaneous Before meals and at bedtime  insulin lispro Injectable (ADMELOG) 30 Unit(s) SubCutaneous three times a day before meals  apixaban 5 milliGRAM(s) Oral every 12 hours  clopidogrel Tablet 75 milliGRAM(s) Oral daily  dextrose 5%. 1000 milliLiter(s) IV Continuous <Continuous>  dextrose 5%. 1000 milliLiter(s) IV Continuous <Continuous>  oxybutynin 5 milliGRAM(s) Oral three times a day  sodium chloride 0.9%. 1000 milliLiter(s) IV Continuous <Continuous>    VITAL SIGNS:  T(C): 36.8 (06-27-23 @ 09:19), Max: 36.8 (06-27-23 @ 09:19)  HR: 100 (06-27-23 @ 08:50) (84 - 109)  BP: 151/59 (06-27-23 @ 08:50) (104/50 - 151/59)  RR: 17 (06-27-23 @ 08:50) (17 - 18)  SpO2: 94% (06-27-23 @ 08:50) (94% - 98%)  Wt(kg): --    I&O's Summary    26 Jun 2023 07:01  -  27 Jun 2023 07:00  --------------------------------------------------------  IN: 1534 mL / OUT: 3600 mL / NET: -2066 mL    27 Jun 2023 07:01  -  27 Jun 2023 11:16  --------------------------------------------------------  IN: 200 mL / OUT: 500 mL / NET: -300 mL                   PHYSICAL EXAM:  Appearance: Normal	  HEENT: Normal oral mucosa, PERRL, EOMI	  Neck: Supple, + JVD/ - JVD; ___ Carotid Bruit   Cardiovascular: Normal S1 S2, No murmurs  Respiratory: Lungs clear to auscultation/Decreased Breath Sounds/No Rales, Rhonchi, Wheezing	  Gastrointestinal:  Soft, Non-tender, + BS	  Skin: No rashes, No ecchymoses, No cyanosis  Extremities: Normal range of motion, No clubbing, cyanosis or edema  Vascular: Peripheral pulses palpable 2+ bilaterally  Neurologic: Non-focal  Psychiatry: A & O x 3, Mood & affect appropriate    LABS:	 	                        10.1   8.73  )-----------( 384      ( 27 Jun 2023 05:30 )             33.4     06-27    136  |  100  |  21  ----------------------------<  343<H>  4.0   |  24  |  1.14    Ca    7.6<L>      27 Jun 2023 05:30  Mg     1.5     06-27    TPro  7.3  /  Alb  3.4  /  TBili  <0.2  /  DBili  x   /  AST  13  /  ALT  9<L>  /  AlkPhos  126<H>  06-26  proBNP:   Lipid Profile:   HgA1c:   TSH:   PT/INR - ( 27 Jun 2023 05:30 )   PT: 12.8 sec;   INR: 1.07          PTT - ( 27 Jun 2023 05:30 )  PTT:29.5 sec Cardiology PA Adult Progress Note    SUBJECTIVE ASSESSMENT: Pt seen and examined today. Pt is now agreeable to removal of suprapubic catheter. Pt requests that she have commode and ankle brace on left foot on d/c, as well as PT inpatient to help regain strength.   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 Pt denies chest pain, dyspnea, palpitations or any other symptoms. Pt underwent cardiac cath 6/26 w/ IVUS guided NAT pRCA (85%) and NAT RPLS (80%).   	  MEDICATIONS:  metoprolol succinate ER 50 milliGRAM(s) Oral daily  albuterol    90 MICROgram(s) HFA Inhaler 2 Puff(s) Inhalation every 6 hours PRN  acetaminophen     Tablet .. 650 milliGRAM(s) Oral every 8 hours PRN  gabapentin 600 milliGRAM(s) Oral three times a day  oxyCODONE    IR 10 milliGRAM(s) Oral two times a day PRN  atorvastatin 20 milliGRAM(s) Oral at bedtime  dextrose 50% Injectable 12.5 Gram(s) IV Push once  dextrose 50% Injectable 25 Gram(s) IV Push once  dextrose 50% Injectable 25 Gram(s) IV Push once  dextrose Oral Gel 15 Gram(s) Oral once PRN  glucagon  Injectable 1 milliGRAM(s) IntraMuscular once  insulin glargine Injectable (LANTUS) 50 Unit(s) SubCutaneous <User Schedule>  insulin lispro (ADMELOG) corrective regimen sliding scale   SubCutaneous Before meals and at bedtime  insulin lispro Injectable (ADMELOG) 30 Unit(s) SubCutaneous three times a day before meals  apixaban 5 milliGRAM(s) Oral every 12 hours  clopidogrel Tablet 75 milliGRAM(s) Oral daily  dextrose 5%. 1000 milliLiter(s) IV Continuous <Continuous>  dextrose 5%. 1000 milliLiter(s) IV Continuous <Continuous>  oxybutynin 5 milliGRAM(s) Oral three times a day  sodium chloride 0.9%. 1000 milliLiter(s) IV Continuous <Continuous>    VITAL SIGNS:  T(C): 36.8 (06-27-23 @ 09:19), Max: 36.8 (06-27-23 @ 09:19)  HR: 100 (06-27-23 @ 08:50) (84 - 109)  BP: 151/59 (06-27-23 @ 08:50) (104/50 - 151/59)  RR: 17 (06-27-23 @ 08:50) (17 - 18)  SpO2: 94% (06-27-23 @ 08:50) (94% - 98%)  Wt(kg): --    I&O's Summary    26 Jun 2023 07:01  -  27 Jun 2023 07:00  --------------------------------------------------------  IN: 1534 mL / OUT: 3600 mL / NET: -2066 mL    27 Jun 2023 07:01  -  27 Jun 2023 11:16  --------------------------------------------------------  IN: 200 mL / OUT: 500 mL / NET: -300 mL                   PHYSICAL EXAM:  Appearance: Normal	  HEENT: Normal oral mucosa, PERRL, EOMI	  Neck: Supple, + JVD/ - JVD; ___ Carotid Bruit   Cardiovascular: Normal S1 S2, No murmurs  Respiratory: Lungs clear to auscultation/Decreased Breath Sounds/No Rales, Rhonchi, Wheezing	  Gastrointestinal:  Soft, Non-tender, + BS	  Skin: No rashes, No ecchymoses, No cyanosis  Extremities: Normal range of motion, No clubbing, cyanosis or edema  Vascular: Peripheral pulses palpable 2+ bilaterally  Neurologic: Non-focal  Psychiatry: A & O x 3, Mood & affect appropriate    LABS:	 	                        10.1   8.73  )-----------( 384      ( 27 Jun 2023 05:30 )             33.4     06-27    136  |  100  |  21  ----------------------------<  343<H>  4.0   |  24  |  1.14    Ca    7.6<L>      27 Jun 2023 05:30  Mg     1.5     06-27    TPro  7.3  /  Alb  3.4  /  TBili  <0.2  /  DBili  x   /  AST  13  /  ALT  9<L>  /  AlkPhos  126<H>  06-26  proBNP:   Lipid Profile:   HgA1c:   TSH:   PT/INR - ( 27 Jun 2023 05:30 )   PT: 12.8 sec;   INR: 1.07          PTT - ( 27 Jun 2023 05:30 )  PTT:29.5 sec Cardiology PA Adult Progress Note    SUBJECTIVE ASSESSMENT: Pt seen and examined today. Pt is now agreeable to removal of suprapubic catheter. Pt requests that she have commode and ankle brace on left foot on d/c, as well as PT inpatient to help regain strength.   Pt became emotional in room while discussing hardships at home. Pt denies chest pain, dyspnea, palpitations or any other symptoms. Pt underwent cardiac cath 6/26 w/ IVUS guided NAT pRCA (85%) and NAT RPLS (80%).   	  MEDICATIONS:  metoprolol succinate ER 50 milliGRAM(s) Oral daily  albuterol    90 MICROgram(s) HFA Inhaler 2 Puff(s) Inhalation every 6 hours PRN  acetaminophen     Tablet .. 650 milliGRAM(s) Oral every 8 hours PRN  gabapentin 600 milliGRAM(s) Oral three times a day  oxyCODONE    IR 10 milliGRAM(s) Oral two times a day PRN  atorvastatin 20 milliGRAM(s) Oral at bedtime  dextrose 50% Injectable 12.5 Gram(s) IV Push once  dextrose 50% Injectable 25 Gram(s) IV Push once  dextrose 50% Injectable 25 Gram(s) IV Push once  dextrose Oral Gel 15 Gram(s) Oral once PRN  glucagon  Injectable 1 milliGRAM(s) IntraMuscular once  insulin glargine Injectable (LANTUS) 50 Unit(s) SubCutaneous <User Schedule>  insulin lispro (ADMELOG) corrective regimen sliding scale   SubCutaneous Before meals and at bedtime  insulin lispro Injectable (ADMELOG) 30 Unit(s) SubCutaneous three times a day before meals  apixaban 5 milliGRAM(s) Oral every 12 hours  clopidogrel Tablet 75 milliGRAM(s) Oral daily  dextrose 5%. 1000 milliLiter(s) IV Continuous <Continuous>  dextrose 5%. 1000 milliLiter(s) IV Continuous <Continuous>  oxybutynin 5 milliGRAM(s) Oral three times a day  sodium chloride 0.9%. 1000 milliLiter(s) IV Continuous <Continuous>    VITAL SIGNS:  T(C): 36.8 (06-27-23 @ 09:19), Max: 36.8 (06-27-23 @ 09:19)  HR: 100 (06-27-23 @ 08:50) (84 - 109)  BP: 151/59 (06-27-23 @ 08:50) (104/50 - 151/59)  RR: 17 (06-27-23 @ 08:50) (17 - 18)  SpO2: 94% (06-27-23 @ 08:50) (94% - 98%)  Wt(kg): --    I&O's Summary    26 Jun 2023 07:01  -  27 Jun 2023 07:00  --------------------------------------------------------  IN: 1534 mL / OUT: 3600 mL / NET: -2066 mL    27 Jun 2023 07:01  -  27 Jun 2023 11:16  --------------------------------------------------------  IN: 200 mL / OUT: 500 mL / NET: -300 mL                   PHYSICAL EXAM:  Appearance: Normal	  HEENT: Normal oral mucosa, PERRL, EOMI	  Neck: Supple, + JVD/ - JVD; ___ Carotid Bruit   Cardiovascular: Normal S1 S2, No murmurs  Respiratory: Lungs clear to auscultation/Decreased Breath Sounds/No Rales, Rhonchi, Wheezing	  Gastrointestinal:  Soft, Non-tender, + BS	  Skin: No rashes, No ecchymoses, No cyanosis  Extremities: Normal range of motion, No clubbing, cyanosis or edema  Vascular: Peripheral pulses palpable 2+ bilaterally  Neurologic: Non-focal  Psychiatry: A & O x 3, Mood & affect appropriate    LABS:	 	                        10.1   8.73  )-----------( 384      ( 27 Jun 2023 05:30 )             33.4     06-27    136  |  100  |  21  ----------------------------<  343<H>  4.0   |  24  |  1.14    Ca    7.6<L>      27 Jun 2023 05:30  Mg     1.5     06-27    TPro  7.3  /  Alb  3.4  /  TBili  <0.2  /  DBili  x   /  AST  13  /  ALT  9<L>  /  AlkPhos  126<H>  06-26  proBNP:   Lipid Profile:   HgA1c:   TSH:   PT/INR - ( 27 Jun 2023 05:30 )   PT: 12.8 sec;   INR: 1.07          PTT - ( 27 Jun 2023 05:30 )  PTT:29.5 sec

## 2023-06-27 NOTE — PROGRESS NOTE ADULT - PROBLEM SELECTOR PLAN 3
SIRS criteria met w/ lactate, WBC, and tachycardia, pt remains afebrile and non toxic appearing   -Initial WBC 16.8K, likely i/s/o dehydration, now improved and wnl  -Lactate 7, s/p total of 5L NS bolus w/ improvement to 2.3  -ESR 48 and CRP 32.5  -Serial BCx 6/20, 6/21 and 6/23 NGTD  -UA: (+) WBCs, trace leuks, neg nitrite, +bacteria. UCx (+) Ecoli  -CTA Chest and CT Abd/Pelvis w/o acute findings  -ID consulted, no indication for abx and recommending removal of suprapubic catheter however pt refusing due to immobility. If pt becomes febrile, can trial Tigecycline 100mg IV x 1 > 50mg BID (12hr after loading dose)  -Ostomy nurse consulted and no active signs of infection  -of note, recent admission at [facility] for Urosepsis (Klebsiella and Ecoli discharged with left midline for tx w/ Ertapenem 6/9 and Gentamicin 6/5; pt reports missing two days tx of Ertapenem due to midline malfunction; removed ~1week ago by home care RN) SIRS criteria met w/ lactate, WBC, and tachycardia, pt remains afebrile and non toxic appearing   -Initial WBC 16.8K, likely i/s/o dehydration, now improved and wnl  -Lactate 7, s/p total of 5L NS bolus w/ improvement to 2.3  -ESR 48 and CRP 32.5  -Serial BCx 6/20, 6/21 and 6/23 NGTD  -UA: (+) WBCs, trace leuks, neg nitrite, +bacteria. UCx (+) Ecoli  -CTA Chest and CT Abd/Pelvis w/o acute findings  -ID consulted, no indication for abx. Recommended removal of suprapubic catheter, pt was refusing due to immobility. However pt now is agreeable. Called IR- awaiting call back for removal  -Ostomy nurse consulted and no active signs of infection  -of note, recent admission at [facility] for Urosepsis (Klebsiella and Ecoli discharged with left midline for tx w/ Ertapenem 6/9 and Gentamicin 6/5; pt reports missing two days tx of Ertapenem due to midline malfunction; removed ~1week ago by home care RN) SIRS criteria met w/ lactate, WBC, and tachycardia, pt remains afebrile and non toxic appearing   -Initial WBC 16.8K, likely i/s/o dehydration, now improved and wnl  -Lactate 7, s/p total of 5L NS bolus w/ improvement to 2.3  -ESR 48 and CRP 32.5  -Serial BCx 6/20, 6/21 and 6/23 NGTD  -UA: (+) WBCs, trace leuks, neg nitrite, +bacteria. UCx (+) Ecoli  -CTA Chest and CT Abd/Pelvis w/o acute findings  -ID consulted, no indication for abx. Recommended removal of suprapubic catheter, pt was refusing due to immobility. However pt now is agreeable. Called IR- team will come evaluate pt   -Ostomy nurse consulted and no active signs of infection  -of note, recent admission at [facility] for Urosepsis (Klebsiella and Ecoli discharged with left midline for tx w/ Ertapenem 6/9 and Gentamicin 6/5; pt reports missing two days tx of Ertapenem due to midline malfunction; removed ~1week ago by home care RN) SIRS criteria met w/ lactate, WBC, and tachycardia, pt remains afebrile and non toxic appearing   -Initial WBC 16.8K, likely i/s/o dehydration, now improved and wnl  -Lactate 7, s/p total of 5L NS bolus w/ improvement to 2.3  -ESR 48 and CRP 32.5  -Serial BCx 6/20, 6/21 and 6/23 NGTD  -UA: (+) WBCs, trace leuks, neg nitrite, +bacteria. UCx (+) Ecoli  -CTA Chest and CT Abd/Pelvis w/o acute findings  -ID consulted, no indication for abx. Recommended removal of catheter however no sign of infection presently  -Ostomy nurse consulted and no active signs of infection  -of note, recent admission at [facility] for Urosepsis (Klebsiella and Ecoli discharged with left midline for tx w/ Ertapenem 6/9 and Gentamicin 6/5; pt reports missing two days tx of Ertapenem due to midline malfunction; removed ~1week ago by home care RN)

## 2023-06-27 NOTE — OCCUPATIONAL THERAPY INITIAL EVALUATION ADULT - ADDITIONAL COMMENTS
Pt states that she lives w/ her children in apt w/ elevator access. Pt states that she was mostly independent but had family assistance as nec. Pt has a rollator, w/c and shower chair.

## 2023-06-27 NOTE — PROGRESS NOTE ADULT - PROBLEM SELECTOR PLAN 4
Persistent hypokalemia, despite multiple repletion on 6/21, now improving; HypoK likely 2/2 hyperglycemia  -K today 3.8, s/p Potassium 40mEq x 1 on 06/25. Labs were attempted but was unable to be drawn   -Continue to trend daily and supplement w/ goal K >4 Persistent hypokalemia, despite multiple repletion on 6/21, now improving; HypoK likely 2/2 hyperglycemia  -K noted to be 4.0 at goal today  -Continue to trend daily and supplement w/ goal K >4

## 2023-06-27 NOTE — PROGRESS NOTE ADULT - PROBLEM SELECTOR PROBLEM 5
LORA (acute kidney injury)

## 2023-06-27 NOTE — PROGRESS NOTE ADULT - SUBJECTIVE AND OBJECTIVE BOX
SUBJECTIVE / INTERVAL HPI: Patient was seen and examined this morning. Cardiac cath yesterday with stent placed to pRCA and RPLS. Return for staged PCI of LCx/OM1 (70%) if symptomatic. She received Solu-cortef 200mg yesterday at 1PM. She reports decreased chest pressure on exertion, but reports some chest pain at rest.     CAPILLARY BLOOD GLUCOSE & INSULIN RECEIVED  239 mg/dL (06-26 @ 06:39) - Lantus 40 + lispro 4  225 mg/dL (06-26 @ 08:50)  203 mg/dL (06-26 @ 11:36) - Lispro 0+4  261 mg/dL (06-26 @ 17:24) - Off unit.  305 mg/dL (06-26 @ 18:30) - She ordered food from outside - 10 garlic knots.  333 mg/dL (06-26 @ 21:33) - Lantus 50 + lispro 8  396 mg/dL (06-26 @ 23:50)  343 mg/dL (06-27 @ 05:30)  385 mg/dL (06-27 @ 06:21) - Lantus 50 + lispro 10. Ate 1/2 yogurt parfait. Reports nausea.  231 mg/dL (06-27 @ 11:36) - Lispro 30+4    REVIEW OF SYSTEMS  Constitutional:  Negative fever, chills.  (+) loss of appetite.   Cardiovascular:  Negative for palpitations (+) chest pain   Respiratory:  Negative for cough, wheezing, or shortness of breath.    Gastrointestinal:  (+) Nausea. Negative for vomiting, diarrhea, constipation, or abdominal pain.  Genitourinary:  (+) Suprapubic pain.  Neurologic:  No headache, confusion, dizziness, lightheadedness.    PHYSICAL EXAM  Vital Signs Last 24 Hrs  T(C): 36.8 (27 Jun 2023 09:19), Max: 36.8 (27 Jun 2023 09:19)  T(F): 98.3 (27 Jun 2023 09:19), Max: 98.3 (27 Jun 2023 09:19)  HR: 95 (27 Jun 2023 10:04) (84 - 109)  BP: 163/74 (27 Jun 2023 10:04) (104/50 - 163/74)  BP(mean): 83 (27 Jun 2023 08:50) (71 - 86)  RR: 17 (27 Jun 2023 08:50) (17 - 18)  SpO2: 95% (27 Jun 2023 09:40) (94% - 98%)    Parameters below as of 27 Jun 2023 09:40  Patient On (Oxygen Delivery Method): room air    Constitutional: Awake, alert, obese female, in no acute distress.   HEENT: Normocephalic, atraumatic, NAOMI.  Respiratory: Lungs clear to ausculation bilaterally.   Cardiovascular: regular rhythm, normal S1 and S2, no audible murmurs.   GI: soft, non-tender, non-distended, bowel sounds present. (+) Ostomy bag in place.   : (+) Suprapubic catheter.   Extremities: No lower extremity edema.  Psychiatric: AAO x 3. Normal affect/mood.     LABS  CBC - WBC/HGB/HTC/PLT: 8.73/10.1/33.4/384 (06-27-23)  BMP - Na/K/Cl/Bicarb/BUN/Cr/Gluc/AG/eGFR: 136/4.0/100/24/21/1.14/343/12/61 (06-27-23)  Ca - 7.6 (06-27-23)  Phos - -- (06-27-23)  Mg - 1.5 (06-27-23)  LFT - Alb/Tprot/Tbili/Dbili/AlkPhos/ALT/AST: 3.4/--/<0.2/--/126/9/13 (06-26-23)  PT/aPTT/INR: 12.8/29.5/1.07 (06-27-23)   Thyroid Stimulating Hormone, Serum: 0.305 (06-21-23)      MEDICATIONS  MEDICATIONS  (STANDING):  apixaban 5 milliGRAM(s) Oral every 12 hours  atorvastatin 20 milliGRAM(s) Oral at bedtime  clopidogrel Tablet 75 milliGRAM(s) Oral daily  dextrose 5%. 1000 milliLiter(s) (100 mL/Hr) IV Continuous <Continuous>  dextrose 5%. 1000 milliLiter(s) (50 mL/Hr) IV Continuous <Continuous>  dextrose 50% Injectable 12.5 Gram(s) IV Push once  dextrose 50% Injectable 25 Gram(s) IV Push once  dextrose 50% Injectable 25 Gram(s) IV Push once  gabapentin 600 milliGRAM(s) Oral three times a day  glucagon  Injectable 1 milliGRAM(s) IntraMuscular once  insulin glargine Injectable (LANTUS) 50 Unit(s) SubCutaneous <User Schedule>  insulin lispro (ADMELOG) corrective regimen sliding scale   SubCutaneous Before meals and at bedtime  insulin lispro Injectable (ADMELOG) 30 Unit(s) SubCutaneous three times a day before meals  lactobacillus acidophilus 1 Tablet(s) Oral daily  magnesium sulfate  IVPB 2 Gram(s) IV Intermittent once  metoprolol succinate ER 50 milliGRAM(s) Oral daily  oxybutynin 5 milliGRAM(s) Oral three times a day  sodium chloride 0.9%. 1000 milliLiter(s) (250 mL/Hr) IV Continuous <Continuous>    MEDICATIONS  (PRN):  acetaminophen     Tablet .. 650 milliGRAM(s) Oral every 8 hours PRN Mild Pain (1 - 3)  albuterol    90 MICROgram(s) HFA Inhaler 2 Puff(s) Inhalation every 6 hours PRN Shortness of Breath and/or Wheezing  dextrose Oral Gel 15 Gram(s) Oral once PRN Blood Glucose LESS THAN 70 milliGRAM(s)/deciliter  oxyCODONE    IR 10 milliGRAM(s) Oral two times a day PRN Moderate Pain (4 - 6)

## 2023-06-27 NOTE — PROGRESS NOTE ADULT - PROBLEM SELECTOR PLAN 8
h/o PE on 4/2023  -CTA Chest on admit negative for PE  -Holding home Eliquis 2/2 upcoming cath, c/w Heparin gtt h/o PE on 4/2023  -CTA Chest on admit negative for PE  -Holding home Eliquis 2/2 upcoming cath

## 2023-06-28 ENCOUNTER — TRANSCRIPTION ENCOUNTER (OUTPATIENT)
Age: 43
End: 2023-06-28

## 2023-06-28 LAB
ANION GAP SERPL CALC-SCNC: 14 MMOL/L — SIGNIFICANT CHANGE UP (ref 5–17)
BUN SERPL-MCNC: 23 MG/DL — SIGNIFICANT CHANGE UP (ref 7–23)
CALCIUM SERPL-MCNC: 8 MG/DL — LOW (ref 8.4–10.5)
CHLORIDE SERPL-SCNC: 102 MMOL/L — SIGNIFICANT CHANGE UP (ref 96–108)
CO2 SERPL-SCNC: 22 MMOL/L — SIGNIFICANT CHANGE UP (ref 22–31)
CREAT SERPL-MCNC: 1.15 MG/DL — SIGNIFICANT CHANGE UP (ref 0.5–1.3)
CULTURE RESULTS: SIGNIFICANT CHANGE UP
CULTURE RESULTS: SIGNIFICANT CHANGE UP
EGFR: 61 ML/MIN/1.73M2 — SIGNIFICANT CHANGE UP
GLUCOSE BLDC GLUCOMTR-MCNC: 105 MG/DL — HIGH (ref 70–99)
GLUCOSE BLDC GLUCOMTR-MCNC: 159 MG/DL — HIGH (ref 70–99)
GLUCOSE BLDC GLUCOMTR-MCNC: 234 MG/DL — HIGH (ref 70–99)
GLUCOSE BLDC GLUCOMTR-MCNC: 291 MG/DL — HIGH (ref 70–99)
GLUCOSE BLDC GLUCOMTR-MCNC: 312 MG/DL — HIGH (ref 70–99)
GLUCOSE BLDC GLUCOMTR-MCNC: 408 MG/DL — HIGH (ref 70–99)
GLUCOSE BLDC GLUCOMTR-MCNC: 83 MG/DL — SIGNIFICANT CHANGE UP (ref 70–99)
GLUCOSE SERPL-MCNC: 370 MG/DL — HIGH (ref 70–99)
HCT VFR BLD CALC: 36.4 % — SIGNIFICANT CHANGE UP (ref 34.5–45)
HGB BLD-MCNC: 10.7 G/DL — LOW (ref 11.5–15.5)
MAGNESIUM SERPL-MCNC: 1.8 MG/DL — SIGNIFICANT CHANGE UP (ref 1.6–2.6)
MCHC RBC-ENTMCNC: 24.4 PG — LOW (ref 27–34)
MCHC RBC-ENTMCNC: 29.4 GM/DL — LOW (ref 32–36)
MCV RBC AUTO: 82.9 FL — SIGNIFICANT CHANGE UP (ref 80–100)
NRBC # BLD: 0 /100 WBCS — SIGNIFICANT CHANGE UP (ref 0–0)
PLATELET # BLD AUTO: 416 K/UL — HIGH (ref 150–400)
POTASSIUM SERPL-MCNC: 4.5 MMOL/L — SIGNIFICANT CHANGE UP (ref 3.5–5.3)
POTASSIUM SERPL-SCNC: 4.5 MMOL/L — SIGNIFICANT CHANGE UP (ref 3.5–5.3)
RBC # BLD: 4.39 M/UL — SIGNIFICANT CHANGE UP (ref 3.8–5.2)
RBC # FLD: 16.1 % — HIGH (ref 10.3–14.5)
SODIUM SERPL-SCNC: 138 MMOL/L — SIGNIFICANT CHANGE UP (ref 135–145)
SPECIMEN SOURCE: SIGNIFICANT CHANGE UP
SPECIMEN SOURCE: SIGNIFICANT CHANGE UP
WBC # BLD: 9.88 K/UL — SIGNIFICANT CHANGE UP (ref 3.8–10.5)
WBC # FLD AUTO: 9.88 K/UL — SIGNIFICANT CHANGE UP (ref 3.8–10.5)

## 2023-06-28 PROCEDURE — 99239 HOSP IP/OBS DSCHRG MGMT >30: CPT

## 2023-06-28 PROCEDURE — 99233 SBSQ HOSP IP/OBS HIGH 50: CPT | Mod: GC

## 2023-06-28 RX ORDER — HYDROMORPHONE HYDROCHLORIDE 2 MG/ML
0.5 INJECTION INTRAMUSCULAR; INTRAVENOUS; SUBCUTANEOUS
Refills: 0 | DISCHARGE

## 2023-06-28 RX ORDER — INSULIN LISPRO 100/ML
25 VIAL (ML) SUBCUTANEOUS
Refills: 0 | Status: DISCONTINUED | OUTPATIENT
Start: 2023-06-28 | End: 2023-06-28

## 2023-06-28 RX ORDER — LOSARTAN POTASSIUM 100 MG/1
1 TABLET, FILM COATED ORAL
Refills: 0 | DISCHARGE

## 2023-06-28 RX ORDER — DIPHENHYDRAMINE HCL 50 MG
1 CAPSULE ORAL
Refills: 0 | DISCHARGE

## 2023-06-28 RX ORDER — LACTOBACILLUS ACIDOPHILUS 100MM CELL
1 CAPSULE ORAL
Qty: 30 | Refills: 1
Start: 2023-06-28 | End: 2023-08-26

## 2023-06-28 RX ORDER — ATORVASTATIN CALCIUM 80 MG/1
1 TABLET, FILM COATED ORAL
Qty: 30 | Refills: 1
Start: 2023-06-28 | End: 2023-08-26

## 2023-06-28 RX ORDER — INSULIN GLARGINE 100 [IU]/ML
15 INJECTION, SOLUTION SUBCUTANEOUS ONCE
Refills: 0 | Status: COMPLETED | OUTPATIENT
Start: 2023-06-28 | End: 2023-06-28

## 2023-06-28 RX ORDER — ONDANSETRON 8 MG/1
4 TABLET, FILM COATED ORAL ONCE
Refills: 0 | Status: COMPLETED | OUTPATIENT
Start: 2023-06-28 | End: 2023-06-28

## 2023-06-28 RX ORDER — INSULIN LISPRO 100/ML
25 VIAL (ML) SUBCUTANEOUS
Refills: 0 | Status: DISCONTINUED | OUTPATIENT
Start: 2023-06-28 | End: 2023-06-29

## 2023-06-28 RX ORDER — METOPROLOL TARTRATE 50 MG
1 TABLET ORAL
Qty: 30 | Refills: 2
Start: 2023-06-28 | End: 2023-09-25

## 2023-06-28 RX ORDER — APIXABAN 2.5 MG/1
1 TABLET, FILM COATED ORAL
Qty: 60 | Refills: 3
Start: 2023-06-28 | End: 2023-10-25

## 2023-06-28 RX ORDER — OXYCODONE HYDROCHLORIDE 5 MG/1
1 TABLET ORAL
Refills: 0 | DISCHARGE

## 2023-06-28 RX ORDER — CLOPIDOGREL BISULFATE 75 MG/1
1 TABLET, FILM COATED ORAL
Qty: 30 | Refills: 11
Start: 2023-06-28 | End: 2024-06-21

## 2023-06-28 RX ORDER — HYDROMORPHONE HYDROCHLORIDE 2 MG/ML
1 INJECTION INTRAMUSCULAR; INTRAVENOUS; SUBCUTANEOUS ONCE
Refills: 0 | Status: DISCONTINUED | OUTPATIENT
Start: 2023-06-28 | End: 2023-06-28

## 2023-06-28 RX ADMIN — Medication 5 MILLIGRAM(S): at 05:25

## 2023-06-28 RX ADMIN — Medication 2: at 12:14

## 2023-06-28 RX ADMIN — ONDANSETRON 4 MILLIGRAM(S): 8 TABLET, FILM COATED ORAL at 14:29

## 2023-06-28 RX ADMIN — Medication 4: at 17:04

## 2023-06-28 RX ADMIN — Medication 25 UNIT(S): at 17:29

## 2023-06-28 RX ADMIN — APIXABAN 5 MILLIGRAM(S): 2.5 TABLET, FILM COATED ORAL at 05:24

## 2023-06-28 RX ADMIN — OXYCODONE HYDROCHLORIDE 10 MILLIGRAM(S): 5 TABLET ORAL at 01:29

## 2023-06-28 RX ADMIN — HYDROMORPHONE HYDROCHLORIDE 1 MILLIGRAM(S): 2 INJECTION INTRAMUSCULAR; INTRAVENOUS; SUBCUTANEOUS at 11:20

## 2023-06-28 RX ADMIN — Medication 6: at 21:54

## 2023-06-28 RX ADMIN — Medication 650 MILLIGRAM(S): at 20:28

## 2023-06-28 RX ADMIN — CLOPIDOGREL BISULFATE 75 MILLIGRAM(S): 75 TABLET, FILM COATED ORAL at 11:30

## 2023-06-28 RX ADMIN — Medication 5 MILLIGRAM(S): at 21:56

## 2023-06-28 RX ADMIN — INSULIN GLARGINE 50 UNIT(S): 100 INJECTION, SOLUTION SUBCUTANEOUS at 06:54

## 2023-06-28 RX ADMIN — Medication 50 MILLIGRAM(S): at 05:25

## 2023-06-28 RX ADMIN — INSULIN GLARGINE 50 UNIT(S): 100 INJECTION, SOLUTION SUBCUTANEOUS at 21:55

## 2023-06-28 RX ADMIN — Medication 5 MILLIGRAM(S): at 11:29

## 2023-06-28 RX ADMIN — GABAPENTIN 600 MILLIGRAM(S): 400 CAPSULE ORAL at 21:56

## 2023-06-28 RX ADMIN — OXYCODONE HYDROCHLORIDE 10 MILLIGRAM(S): 5 TABLET ORAL at 11:03

## 2023-06-28 RX ADMIN — GABAPENTIN 600 MILLIGRAM(S): 400 CAPSULE ORAL at 14:24

## 2023-06-28 RX ADMIN — OXYCODONE HYDROCHLORIDE 10 MILLIGRAM(S): 5 TABLET ORAL at 00:29

## 2023-06-28 RX ADMIN — Medication 650 MILLIGRAM(S): at 05:25

## 2023-06-28 RX ADMIN — ATORVASTATIN CALCIUM 40 MILLIGRAM(S): 80 TABLET, FILM COATED ORAL at 21:56

## 2023-06-28 RX ADMIN — HYDROMORPHONE HYDROCHLORIDE 1 MILLIGRAM(S): 2 INJECTION INTRAMUSCULAR; INTRAVENOUS; SUBCUTANEOUS at 10:55

## 2023-06-28 RX ADMIN — Medication 1 TABLET(S): at 11:30

## 2023-06-28 RX ADMIN — APIXABAN 5 MILLIGRAM(S): 2.5 TABLET, FILM COATED ORAL at 17:04

## 2023-06-28 RX ADMIN — Medication 650 MILLIGRAM(S): at 06:25

## 2023-06-28 RX ADMIN — Medication 30 UNIT(S): at 12:15

## 2023-06-28 RX ADMIN — GABAPENTIN 600 MILLIGRAM(S): 400 CAPSULE ORAL at 05:25

## 2023-06-28 RX ADMIN — Medication 650 MILLIGRAM(S): at 19:28

## 2023-06-28 RX ADMIN — Medication 8: at 06:52

## 2023-06-28 RX ADMIN — CHLORHEXIDINE GLUCONATE 1 APPLICATION(S): 213 SOLUTION TOPICAL at 05:28

## 2023-06-28 RX ADMIN — Medication 30 UNIT(S): at 06:53

## 2023-06-28 RX ADMIN — OXYCODONE HYDROCHLORIDE 10 MILLIGRAM(S): 5 TABLET ORAL at 09:52

## 2023-06-28 RX ADMIN — INSULIN GLARGINE 15 UNIT(S): 100 INJECTION, SOLUTION SUBCUTANEOUS at 11:14

## 2023-06-28 NOTE — PROGRESS NOTE ADULT - NS ATTEND BILL GEN_ALL_CORE
Attending to bill
PA/NP to bill
Attending to bill

## 2023-06-28 NOTE — PROGRESS NOTE ADULT - SUBJECTIVE AND OBJECTIVE BOX
SUBJECTIVE / INTERVAL HPI: Patient was seen and examined this morning. Suprapubic catheter removed today and she has voided freely. She reports nausea and decreased appetite. Reports not eating this morning. RN reports she had 2 trays. Denies eating or drinking anything overnight.    CAPILLARY BLOOD GLUCOSE & INSULIN RECEIVED  343 mg/dL (06-27 @ 05:30)  385 mg/dL (06-27 @ 06:21) - Lantus 50 + lispro 10  231 mg/dL (06-27 @ 11:36) - Lispro 30+4  212 mg/dL (06-27 @ 16:52) - Lispro 30+4. She ate rice and beans.  145 mg/dL (06-27 @ 21:35) - Lantus 50  312 mg/dL (06-28 @ 06:22) - Lantus 50. Lispro 30+8  370 mg/dL (06-28 @ 08:18)  159 mg/dL (06-28 @ 11:30) - Lispro 30+2  83 mg/dL (06-28 @ 13:57) - Symptomatic, nauseous. Zofran to be given to take PO treatment.  105 mg/dL (06-28 @ 15:01)    REVIEW OF SYSTEMS  Constitutional:  Negative fever, chills.  (+) loss of appetite.   Cardiovascular:  Negative for palpitations (+) chest pain   Respiratory:  Negative for cough, wheezing, or shortness of breath.    Gastrointestinal:  (+) Nausea. Negative for vomiting, diarrhea, constipation, or abdominal pain.  Neurologic:  No headache, confusion, dizziness, lightheadedness.    PHYSICAL EXAM  Vital Signs Last 24 Hrs  T(C): 37.2 (28 Jun 2023 14:19), Max: 37.2 (28 Jun 2023 14:19)  T(F): 98.9 (28 Jun 2023 14:19), Max: 98.9 (28 Jun 2023 14:19)  HR: 84 (28 Jun 2023 14:29) (81 - 90)  BP: 123/75 (28 Jun 2023 14:29) (123/75 - 143/72)  BP(mean): 94 (28 Jun 2023 14:29) (80 - 96)  RR: 18 (28 Jun 2023 14:29) (17 - 18)  SpO2: 95% (28 Jun 2023 08:56) (95% - 96%)    Parameters below as of 28 Jun 2023 14:29  Patient On (Oxygen Delivery Method): room air        Constitutional: Awake, alert, obese female, in no acute distress.   HEENT: Normocephalic, atraumatic, NAOMI.  Respiratory: Lungs clear to ausculation bilaterally.   Cardiovascular: regular rhythm, normal S1 and S2, no audible murmurs.   GI: soft, non-tender, non-distended, bowel sounds present. (+) Ostomy bag in place.   : Suprapubic catheter removed today.  Extremities: No lower extremity edema.  Psychiatric: AAO x 3. Normal affect/mood.       LABS  CBC - WBC/HGB/HTC/PLT: 9.88/10.7/36.4/416 (06-28-23)  BMP - Na/K/Cl/Bicarb/BUN/Cr/Gluc/AG/eGFR: 138/4.5/102/22/23/1.15/370/14/61 (06-28-23)  Ca - 8.0 (06-28-23)  Phos - -- (06-28-23)  Mg - 1.8 (06-28-23)  LFT - Alb/Tprot/Tbili/Dbili/AlkPhos/ALT/AST: 3.4/--/<0.2/--/126/9/13 (06-26-23)  PT/aPTT/INR: 12.8/29.5/1.07 (06-27-23)   Thyroid Stimulating Hormone, Serum: 0.305 (06-21-23)      MEDICATIONS  MEDICATIONS  (STANDING):  apixaban 5 milliGRAM(s) Oral every 12 hours  atorvastatin 40 milliGRAM(s) Oral at bedtime  chlorhexidine 2% Cloths 1 Application(s) Topical <User Schedule>  clopidogrel Tablet 75 milliGRAM(s) Oral daily  dextrose 5%. 1000 milliLiter(s) (100 mL/Hr) IV Continuous <Continuous>  dextrose 5%. 1000 milliLiter(s) (50 mL/Hr) IV Continuous <Continuous>  dextrose 50% Injectable 25 Gram(s) IV Push once  dextrose 50% Injectable 12.5 Gram(s) IV Push once  dextrose 50% Injectable 25 Gram(s) IV Push once  gabapentin 600 milliGRAM(s) Oral three times a day  glucagon  Injectable 1 milliGRAM(s) IntraMuscular once  insulin glargine Injectable (LANTUS) 50 Unit(s) SubCutaneous <User Schedule>  insulin lispro (ADMELOG) corrective regimen sliding scale   SubCutaneous Before meals and at bedtime  insulin lispro Injectable (ADMELOG) 30 Unit(s) SubCutaneous three times a day before meals  lactobacillus acidophilus 1 Tablet(s) Oral daily  metoprolol succinate ER 50 milliGRAM(s) Oral daily  oxybutynin 5 milliGRAM(s) Oral three times a day  sodium chloride 0.9%. 1000 milliLiter(s) (250 mL/Hr) IV Continuous <Continuous>    MEDICATIONS  (PRN):  acetaminophen     Tablet .. 650 milliGRAM(s) Oral every 8 hours PRN Mild Pain (1 - 3)  albuterol    90 MICROgram(s) HFA Inhaler 2 Puff(s) Inhalation every 6 hours PRN Shortness of Breath and/or Wheezing  dextrose Oral Gel 15 Gram(s) Oral once PRN Blood Glucose LESS THAN 70 milliGRAM(s)/deciliter  oxyCODONE    IR 10 milliGRAM(s) Oral two times a day PRN Moderate Pain (4 - 6)     Never smoker

## 2023-06-28 NOTE — DISCHARGE NOTE NURSING/CASE MANAGEMENT/SOCIAL WORK - PATIENT PORTAL LINK FT
You can access the FollowMyHealth Patient Portal offered by [facility] by registering at the following website: [email or web address]. By joining [facility]’s FollowMyHealth portal, you will also be able to view your health information using other applications (apps) compatible with our system.

## 2023-06-28 NOTE — DISCHARGE NOTE NURSING/CASE MANAGEMENT/SOCIAL WORK - NSDCVIVACCINE_GEN_ALL_CORE_FT
Tdap; 25-Apr-2019 12:41; Neena Chavez (ARMANDO); [employer]; [id number] (Exp. Date: 02-Nov-2020); IntraMuscular; Deltoid Right.; 0.5 milliLiter(s); VIS (VIS Published: 09-May-2013, VIS Presented: 25-Apr-2019);

## 2023-06-28 NOTE — PROGRESS NOTE ADULT - PROBLEM SELECTOR PLAN 1
- Increase Lantus to  units at bedtime and Lantus units this AM.   - Continue lispro   units before each meal.  - Continue lispro moderate dose sliding scale before meals and at bedtime.  - Patient's fingerstick glucose goal is 100-180 mg/dL.    - For discharge, patient can U500 and admelog, dose TBD.  - Patient can follow up at discharge with [facility] by calling [phone] to make an appointment.      Case discussed with Dr. Rose. Primary team updated. - Continue Lantus 50 units at bedtime and Lantus 50 units in the AM.   - Decrease lispro to 25  units before each meal.  - Continue lispro moderate dose sliding scale before meals and at bedtime.  - Patient's fingerstick glucose goal is 100-180 mg/dL.    - For discharge, patient can U500 50 units TID and admelog 30 units TID with meals. Advised her to discuss restarting GLP-1 as outpatient with Dr Joseph.  - Patient can follow up at discharge with [facility] by calling [phone] to make an appointment.      Case discussed with Dr. Rose. Primary team updated.

## 2023-06-28 NOTE — PROGRESS NOTE ADULT - TIME BILLING
evaluation for UTI
evaluation for urosepsis
case complexity
NSTEMI
NSTEMI

## 2023-06-28 NOTE — DISCHARGE NOTE NURSING/CASE MANAGEMENT/SOCIAL WORK - NSDCPEFALRISK_GEN_ALL_CORE
For information on Fall & Injury Prevention, visit: [email or web address] OR  [email or web address] OR  https://www.cdc.gov/steadi/patient.html

## 2023-06-28 NOTE — PROGRESS NOTE ADULT - NS ATTEST RISK PROBLEM GEN_ALL_CORE FT
CAD
coronary artery disease and complications of abdominal fasciitis
CHD
Abdominal fasciitis and its complications
side effects since development of fasciitis of abdominal wall.

## 2023-06-28 NOTE — PROGRESS NOTE ADULT - ASSESSMENT
43F w/ asthma, PE (on eliquis), HTN, DM, HLD, abdominal nec fasc (s/p suprapubic catheter and ostomy on Nov 2021), and recent admission to [facility] 5/22-6/1 for UTI, presenting with left sided chest pain for 1 day, found to have negative cardiac work-up, persistent hyperglycemia, now s/p cardiac cath with stent placed to pRCA and RPLS on 6/26 and planned to return for staged PCI of LCx/OM1 (70%) if symptomatic.     Contrast allergy requiring premedication with steroids.    A1C: 11.9 %  BUN: 23  Creatinine: 1.15  GFR: 61  Weight: 101.2  BMI: 34.9

## 2023-06-28 NOTE — PROGRESS NOTE ADULT - ATTENDING COMMENTS
Patient evaluated this morning. She refused to have supra-pubic catheter removed yesterday, requesting to have removed today. She doesn't reports chest pain, no reported SOB, ostomy with stools. We discussed at length medical management with patient and her mother over the phone. Patient reports pain all over, we discussed to attempt to optimize pain management with non-opioids medications optimization, we are still maintaining PO opioids for a short term to assist with the transition and have outpatient pain management in place. Will provide IV pain regimen prior to cathter removal. No pain management consult is available inpatient and patient is agreeable to have follow-up as outpatient to establish care.     Exam   General: AOX3, NAD, sitting in bed, speaking in full sentences, no labored breathing, patient in no distress, moving in bed, without signs of discomfort  HEENT: AT/NC, no facial asymmetry  Abdomen: ostomy with stools, cathter without erythema, no visible surrounding discharge  Extremities: right wrist without hematoma, + pulses, warm digits. LE no visible edema, no gross focal deficit     Labs reviewed    Patient to follow-up with Interventional cardiology on discharge, AC, Clopidogrel resumed.   Patient requesting to have supra-pubic cathter removed prior to discharge. IR contacted  Patient noted with fast drop in FS, received an extra dose of Lantus this morning per Endocrinology, communicated to primary team. Endocrinology contacted to follow-up   Pain management discussed at length, she is agreeable to maintain current regimen. Will monitor and continue titration as needed. Please ensure patient has an appointment prior to discharge, primary team unable to prescribe controlled substances, will assist and provide prescription on discharge        Patient Demographic Information (PDI)       PDI	First Name	Last Name	Birth Date	Gender	Street Address	[facility]	Zip Code  A	Rachel Katz	1980	Female	[address]	[place]	[address]  B	Rachel Katz	1980	Female	[address]	[address]  C	Rachel Katz	1980	Female	[address] [place]	[address]    Prescription Information      PDI Filter:    PDI	My Rx	Current Rx	Drug Type	Rx Written	Rx Dispensed	Drug	Quantity	Days Supply	Prescriber Name	Prescriber ALEE #	Payment Method	Dispenser  A	N	N	O	06/01/2023	06/01/2023	oxycodone hcl (ir) 5 mg tablet	20	5	Salvador Healy	[id number]	Medicaid	[facility]  B	N	N	O	11/27/2022	12/01/2022	oxycodone hcl (ir) 10 mg tab	60	15	Amadou Soto MD	[id number]	Insurance	[facility]  B	N	N	O	11/14/2022	11/15/2022	oxycodone hcl (ir) 20 mg tab	120	30	Amadou Soto MD	[id number]	Insurance	[facility]  B	N	N	O	10/23/2022	11/01/2022	oxycodone hcl (ir) 20 mg tab	56	14	Radha [facility]	[id number]	Insurance	[facility]  B	N	N	O	10/23/2022	10/23/2022	oxycodone hcl (ir) 10 mg tab	56	14	Radha, [facility]	[id number]	Cash	[facility]  B	N	N	O	10/16/2022	10/16/2022	oxycodone hcl (ir) 20 mg tab	60	30	Radhade, [facility]	[id number]	Insurance	[facility]  B	N	N	O	10/09/2022	10/09/2022	oxycodone hcl (ir) 10 mg tab	28	7	Pelon Leal MD	[id number]	Insurance	[facility]  B	N	N	O	10/02/2022	10/03/2022	oxycodone hcl (ir) 20 mg tab	28	7	Pelon Leal MD	[id number]	Insurance	[facility]  B	N	N	O	10/01/2022	10/01/2022	oxycodone hcl (ir) 10 mg tab	28	7	Pelon Leal MD	[id number]	Insurance	[facility]  B	N	N	O	09/15/2022	09/15/2022	oxycodone hcl (ir) 30 mg tab	60	15	Amadou Soto MD	[id number]	Insurance	[facility]  C	N	N	O	07/15/2022	07/24/2022	oxycodone hcl (ir) 30 mg tab	90	30	Adrienne Cruz MD	[id number]	Insurance	[employer]  C	N	N	O	06/28/2022	07/15/2022	oxycodone hcl (ir) 30 mg tab	30	10	Adrienne Cruz MD	[id number]	Insurance	[employer]  C	N	N	O	06/28/2022	07/11/2022	oxycodone hcl (ir) 30 mg tab	9	3	Adrienne Cruz MD	[id number]	Other	[employer]    * - Details of Drug Type : O = Opioid, B = Benzodiazepine, S = Stimulant    * - Drugs marked with an asterisk are compound drugs. If the compound drug is made up of more than one controlled substance, then each controlled substance will be a separate row in the ta

## 2023-06-28 NOTE — DISCHARGE NOTE NURSING/CASE MANAGEMENT/SOCIAL WORK - NSDCPEELIQUISDIET_GEN_ALL_CORE
Eat healthy foods you enjoy. Apixaban/Eliquis DOES NOT have a special diet. Limit your alcohol intake. Cimzia Counseling:  I discussed with the patient the risks of Cimzia including but not limited to immunosuppression, allergic reactions and infections.  The patient understands that monitoring is required including a PPD at baseline and must alert us or the primary physician if symptoms of infection or other concerning signs are noted.

## 2023-06-28 NOTE — PROGRESS NOTE ADULT - ASSESSMENT
44yo woman w/ hx asthma, hx PE on home Eliquis, DM, HTN, HLD, hx abdominal necrotizing fasciitis s/p suprapubic catheter, ostomy placement in 11/2021 who presented to [facility] ED from home with worsening abdominal and suprapubic pain and drainage from prior suprapubic catheter insertion site, recent admission 5/23-6/1 for sepsis 2/2 UTI and catheter-site infection on gentamicin and ertapenem, who presents for acute chest pain w/ elevated troponins, medicine team consulted for management of severe sepsis and hyperglycemia.    Plan/Recommendations:  #R/O Sepsis  On prior admission 5-23-6/1 pt had presented with worsening lower abd pain, drainage from suprapubic catheter site noted in setting of elevated lactate 5.3 in setting of severe sepsis 2/2 UTI from suprapubic catheter. At that time, UCx (5/22) growing carbapenem-resistant Klebsiella & E. Coli. Blood cultures (5/23) NGTD. S/p suprapubic catheter replacement by IR on 5/25 and since treated w/ course gentamicin and ertapenem via PICC on discharge. On current admission, patient presenting with persistent pain symptoms, lactate 7.3 on admission improved w/ IVF since elevated again back up to 7.4.  - agree w/ trial off antibiotics per ID recs  - f/u BCx x2 and UCx x1  - appreciate ID recs    #Poorly-controlled IDDM  A1c 11.9, noted on admission for FSGs in 500s.  - Continue lispro 10 units before each meal.  - mISS  - appreciate endocrine recs and will need to f/u as outpatient as well     #R/O NSTEMI  Patient presenting w/ acute substernal chest pain w/ mildly elevated high sensitivity troponins mid 50s. Otherwise no ST changes noted on ECG.  - agree with admission to cardiac telemetry for possible ischemic eval  - CCTA showing moderate stenosis proximal RCA, underwent cardiac cath 6/26    #Suprapubic/Abd pain  - IR removed of suprapubic catheter per patient's wishes this AM -- gauze and dressings applied   - agree w/ oxybutynin 5mg TID PRN for bladder spasms  - c/w prior pain regimen: Tylenol 975mg q8hrs, Ibuprofen 200mg q6hrs, Oxycodone IR PRN (moderate 5mg q6h, severe 10mg q4h), lidocaine patch for R midback paraspinal tenderness  - c/w PO gabapentin 500mg TID  - c/w oxycodone to 10mg IR BID  - patient to benefit from outpatient pain management evaluation     #Hx necrotizing fasciitis s/p ostomy  Hx of necrotizing fasciitis tx at [facility] in 11/2021 with hospital course c/b bowel resection and ostomy formation. On ROS, no increased output from ostomy bag and site appears clean/dry.  - Routine ostomy care  - Monitor ostomy output  - General surgery outpatient appointment for possible ostomy reversal.    #HTN  Home medications include Labetalol 100 mg BID and Losartan 50mg Q24.  - c/w home BP meds    #Hx Pulmonary embolism  History of bilateral pulmonary emboli in segmental and subsegmental branches on the R and subsegmental branches on the L, diagnosed on CT chest during ED visit on 4/7/23. Home meds: eliquis 5mg BID  - c/w full dose AC  - transition to home PO eliquis 5mg BID when appropriate per primary team    #Asthma  - Continue home Ventolin Q24 PRN

## 2023-06-28 NOTE — PROGRESS NOTE ADULT - PROVIDER SPECIALTY LIST ADULT
Endocrinology
Internal Medicine
Internal Medicine
Cardiology
Hospitalist
Internal Medicine
Internal Medicine
Cardiology
Endocrinology
Endocrinology
Intervent Radiology
Endocrinology
Internal Medicine
Internal Medicine
Cardiology
Cardiology
Endocrinology
Infectious Disease
Infectious Disease
Cardiology

## 2023-06-28 NOTE — PROGRESS NOTE ADULT - SUBJECTIVE AND OBJECTIVE BOX
HASMUKH AGUILERA, 43y, Female  MRN-[id number]  Patient is a 43y old  Female who presents with a chief complaint of dizziness, chest pressure, and blurred vision (28 Jun 2023 11:06)      SUBJECTIVE: Pt seen/examined at bedside. Pt endorsing that she is in a lot of pain and seemed uncomfortable while laying in bed. No difficulties breathing. Denied N/V, abdominal pain, chest pain.     12 Point ROS Negative unless noted otherwise above.  -------------------------------------------------------------------------------  VITAL SIGNS:  Vital Signs Last 24 Hrs  T(C): 36.4 (28 Jun 2023 08:51), Max: 37 (28 Jun 2023 06:00)  T(F): 97.6 (28 Jun 2023 08:51), Max: 98.6 (28 Jun 2023 06:00)  HR: 81 (28 Jun 2023 08:56) (81 - 90)  BP: 143/72 (28 Jun 2023 08:56) (115/73 - 143/72)  BP(mean): 95 (28 Jun 2023 08:56) (80 - 96)  RR: 18 (28 Jun 2023 08:56) (17 - 18)  SpO2: 95% (28 Jun 2023 08:56) (95% - 96%)    Parameters below as of 28 Jun 2023 08:56  Patient On (Oxygen Delivery Method): room air      I&O's Summary    27 Jun 2023 07:01  -  28 Jun 2023 07:00  --------------------------------------------------------  IN: 200 mL / OUT: 3975 mL / NET: -3775 mL    28 Jun 2023 07:01  -  28 Jun 2023 11:33  --------------------------------------------------------  IN: 180 mL / OUT: 400 mL / NET: -220 mL        PHYSICAL EXAM:    General: laying in bed  HEENT: MMM  Neck: supple, trachea midline  Cardiovascular: RRR, +S1/S2; NO M/R/G  Respiratory: CTA B/L; no W/R/R  Gastrointestinal: soft, NT/ND; +BSx4; ostomy in place   Extremities: WWP; no edema or cyanosis; tender to light palpation; ace bandages in place   Vascular: 2+ radial, DP/PT pulses B/L  Neurological: AAOx3    ALLERGIES:  Allergies    shellfish. (Hives; Anaphylaxis)  iodine containing compounds (Hives; Anaphylaxis)  fish (Hives; Urticaria)  clindamycin (Pruritus)  vancomycin (Anaphylaxis; Hives)  amoxicillin (Short breath; Rash)  penicillin (Hives)    Intolerances    Bactrim I.V. (Rash (Mod to Severe))    MEDICATIONS:  MEDICATIONS  (STANDING):  apixaban 5 milliGRAM(s) Oral every 12 hours  atorvastatin 40 milliGRAM(s) Oral at bedtime  chlorhexidine 2% Cloths 1 Application(s) Topical <User Schedule>  clopidogrel Tablet 75 milliGRAM(s) Oral daily  dextrose 5%. 1000 milliLiter(s) (100 mL/Hr) IV Continuous <Continuous>  dextrose 5%. 1000 milliLiter(s) (50 mL/Hr) IV Continuous <Continuous>  dextrose 50% Injectable 25 Gram(s) IV Push once  dextrose 50% Injectable 12.5 Gram(s) IV Push once  dextrose 50% Injectable 25 Gram(s) IV Push once  gabapentin 600 milliGRAM(s) Oral three times a day  glucagon  Injectable 1 milliGRAM(s) IntraMuscular once  insulin glargine Injectable (LANTUS) 50 Unit(s) SubCutaneous <User Schedule>  insulin lispro (ADMELOG) corrective regimen sliding scale   SubCutaneous Before meals and at bedtime  insulin lispro Injectable (ADMELOG) 30 Unit(s) SubCutaneous three times a day before meals  lactobacillus acidophilus 1 Tablet(s) Oral daily  metoprolol succinate ER 50 milliGRAM(s) Oral daily  oxybutynin 5 milliGRAM(s) Oral three times a day  sodium chloride 0.9%. 1000 milliLiter(s) (250 mL/Hr) IV Continuous <Continuous>    MEDICATIONS  (PRN):  acetaminophen     Tablet .. 650 milliGRAM(s) Oral every 8 hours PRN Mild Pain (1 - 3)  albuterol    90 MICROgram(s) HFA Inhaler 2 Puff(s) Inhalation every 6 hours PRN Shortness of Breath and/or Wheezing  dextrose Oral Gel 15 Gram(s) Oral once PRN Blood Glucose LESS THAN 70 milliGRAM(s)/deciliter  oxyCODONE    IR 10 milliGRAM(s) Oral two times a day PRN Moderate Pain (4 - 6)  -------------------------------------------------------------------------------  LABS:                        10.7   9.88  )-----------( 416      ( 28 Jun 2023 08:18 )             36.4     06-28    138  |  102  |  23  ----------------------------<  370<H>  4.5   |  22  |  1.15    Ca    8.0<L>      28 Jun 2023 08:18  Mg     1.8     06-28    TPro  7.3  /  Alb  3.4  /  TBili  <0.2  /  DBili  x   /  AST  13  /  ALT  9<L>  /  AlkPhos  126<H>  06-26    LIVER FUNCTIONS - ( 26 Jun 2023 18:30 )  Alb: 3.4 g/dL / Pro: 7.3 g/dL / ALK PHOS: 126 U/L / ALT: 9 U/L / AST: 13 U/L / GGT: x           PT/INR - ( 27 Jun 2023 05:30 )   PT: 12.8 sec;   INR: 1.07          PTT - ( 27 Jun 2023 05:30 )  PTT:29.5 sec  Urinalysis Basic - ( 28 Jun 2023 08:18 )    Color: x / Appearance: x / SG: x / pH: x  Gluc: 370 mg/dL / Ketone: x  / Bili: x / Urobili: x   Blood: x / Protein: x / Nitrite: x   Leuk Esterase: x / RBC: x / WBC x   Sq Epi: x / Non Sq Epi: x / Bacteria: x    CAPILLARY BLOOD GLUCOSE      POCT Blood Glucose.: 159 mg/dL (28 Jun 2023 11:30)      COVID-19 PCR: NotDetec (30 Apr 2023 11:45)  COVID-19 PCR: NotDetec (04 Apr 2023 02:06)  COVID-19 PCR: NotDetec (21 Mar 2023 18:12)      RADIOLOGY & ADDITIONAL TESTS: Reviewed.

## 2023-06-28 NOTE — PROGRESS NOTE ADULT - SUBJECTIVE AND OBJECTIVE BOX
Cardiology PA Adult Progress Note    SUBJECTIVE ASSESSMENT:   	  MEDICATIONS:  metoprolol succinate ER 50 milliGRAM(s) Oral daily  albuterol    90 MICROgram(s) HFA Inhaler 2 Puff(s) Inhalation every 6 hours PRN  acetaminophen     Tablet .. 650 milliGRAM(s) Oral every 8 hours PRN  gabapentin 600 milliGRAM(s) Oral three times a day  oxyCODONE    IR 10 milliGRAM(s) Oral two times a day PRN  atorvastatin 40 milliGRAM(s) Oral at bedtime  dextrose 50% Injectable 25 Gram(s) IV Push once  dextrose 50% Injectable 12.5 Gram(s) IV Push once  dextrose 50% Injectable 25 Gram(s) IV Push once  dextrose Oral Gel 15 Gram(s) Oral once PRN  glucagon  Injectable 1 milliGRAM(s) IntraMuscular once  insulin glargine Injectable (LANTUS) 50 Unit(s) SubCutaneous <User Schedule>  insulin lispro (ADMELOG) corrective regimen sliding scale   SubCutaneous Before meals and at bedtime  insulin lispro Injectable (ADMELOG) 30 Unit(s) SubCutaneous three times a day before meals  apixaban 5 milliGRAM(s) Oral every 12 hours  chlorhexidine 2% Cloths 1 Application(s) Topical <User Schedule>  clopidogrel Tablet 75 milliGRAM(s) Oral daily  dextrose 5%. 1000 milliLiter(s) IV Continuous <Continuous>  dextrose 5%. 1000 milliLiter(s) IV Continuous <Continuous>  oxybutynin 5 milliGRAM(s) Oral three times a day  sodium chloride 0.9%. 1000 milliLiter(s) IV Continuous <Continuous>    VITAL SIGNS:  T(C): 37 (06-28-23 @ 06:00), Max: 37 (06-28-23 @ 06:00)  HR: 81 (06-28-23 @ 05:09) (81 - 100)  BP: 141/71 (06-28-23 @ 05:09) (115/73 - 163/74)  RR: 17 (06-28-23 @ 05:09) (17 - 18)  SpO2: 96% (06-28-23 @ 05:09) (94% - 96%)  Wt(kg): --    I&O's Summary    27 Jun 2023 07:01  -  28 Jun 2023 07:00  --------------------------------------------------------  IN: 200 mL / OUT: 3300 mL / NET: -3100 mL                            PHYSICAL EXAM:  Appearance: Normal	  HEENT: Normal oral mucosa, PERRL, EOMI	  Neck: Supple, + JVD/ - JVD; ___ Carotid Bruit   Cardiovascular: Normal S1 S2, No murmurs  Respiratory: Lungs clear to auscultation/Decreased Breath Sounds/No Rales, Rhonchi, Wheezing	  Gastrointestinal:  Soft, Non-tender, + BS	  Skin: No rashes, No ecchymoses, No cyanosis  Extremities: Normal range of motion, No clubbing, cyanosis or edema  Vascular: Peripheral pulses palpable 2+ bilaterally  Neurologic: Non-focal  Psychiatry: A & O x 3, Mood & affect appropriate    LABS:	 	                                  10.1   8.73  )-----------( 384      ( 27 Jun 2023 05:30 )             33.4     06-27    136  |  100  |  21  ----------------------------<  343<H>  4.0   |  24  |  1.14    Ca    7.6<L>      27 Jun 2023 05:30  Mg     1.5     06-27    TPro  7.3  /  Alb  3.4  /  TBili  <0.2  /  DBili  x   /  AST  13  /  ALT  9<L>  /  AlkPhos  126<H>  06-26    proBNP:   Lipid Profile:   HgA1c:   TSH:   PT/INR - ( 27 Jun 2023 05:30 )   PT: 12.8 sec;   INR: 1.07          PTT - ( 27 Jun 2023 05:30 )  PTT:29.5 sec

## 2023-06-29 VITALS — TEMPERATURE: 97 F

## 2023-06-29 LAB
GLUCOSE BLDC GLUCOMTR-MCNC: 256 MG/DL — HIGH (ref 70–99)
GLUCOSE BLDC GLUCOMTR-MCNC: 294 MG/DL — HIGH (ref 70–99)

## 2023-06-29 RX ORDER — OXYCODONE HYDROCHLORIDE 5 MG/1
1 TABLET ORAL
Qty: 20 | Refills: 0
Start: 2023-06-29 | End: 2023-07-03

## 2023-06-29 RX ADMIN — Medication 6: at 06:27

## 2023-06-29 RX ADMIN — OXYCODONE HYDROCHLORIDE 10 MILLIGRAM(S): 5 TABLET ORAL at 01:16

## 2023-06-29 RX ADMIN — Medication 650 MILLIGRAM(S): at 06:24

## 2023-06-29 RX ADMIN — Medication 50 MILLIGRAM(S): at 06:25

## 2023-06-29 RX ADMIN — Medication 25 UNIT(S): at 09:10

## 2023-06-29 RX ADMIN — Medication 650 MILLIGRAM(S): at 07:24

## 2023-06-29 RX ADMIN — APIXABAN 5 MILLIGRAM(S): 2.5 TABLET, FILM COATED ORAL at 06:24

## 2023-06-29 RX ADMIN — OXYCODONE HYDROCHLORIDE 10 MILLIGRAM(S): 5 TABLET ORAL at 10:54

## 2023-06-29 RX ADMIN — OXYCODONE HYDROCHLORIDE 10 MILLIGRAM(S): 5 TABLET ORAL at 11:44

## 2023-06-29 RX ADMIN — INSULIN GLARGINE 50 UNIT(S): 100 INJECTION, SOLUTION SUBCUTANEOUS at 06:26

## 2023-06-29 RX ADMIN — GABAPENTIN 600 MILLIGRAM(S): 400 CAPSULE ORAL at 06:25

## 2023-06-29 RX ADMIN — Medication 5 MILLIGRAM(S): at 06:24

## 2023-06-29 RX ADMIN — OXYCODONE HYDROCHLORIDE 10 MILLIGRAM(S): 5 TABLET ORAL at 00:16

## 2023-06-29 RX ADMIN — CHLORHEXIDINE GLUCONATE 1 APPLICATION(S): 213 SOLUTION TOPICAL at 05:37

## 2023-06-30 ENCOUNTER — APPOINTMENT (OUTPATIENT)
Dept: HEART AND VASCULAR | Facility: CLINIC | Age: 43
End: 2023-06-30

## 2023-06-30 PROBLEM — E11.9 TYPE 2 DIABETES MELLITUS WITHOUT COMPLICATIONS: Chronic | Status: ACTIVE | Noted: 2023-06-21

## 2023-06-30 PROBLEM — Z93.59 OTHER CYSTOSTOMY STATUS: Chronic | Status: ACTIVE | Noted: 2023-06-21

## 2023-06-30 PROBLEM — A41.9 SEPSIS, UNSPECIFIED ORGANISM: Chronic | Status: ACTIVE | Noted: 2023-06-21

## 2023-06-30 PROBLEM — Z87.39 PERSONAL HISTORY OF OTHER DISEASES OF THE MUSCULOSKELETAL SYSTEM AND CONNECTIVE TISSUE: Chronic | Status: ACTIVE | Noted: 2023-06-21

## 2023-06-30 PROBLEM — Z93.9 ARTIFICIAL OPENING STATUS, UNSPECIFIED: Chronic | Status: ACTIVE | Noted: 2023-06-21

## 2023-06-30 PROBLEM — Z86.73 PERSONAL HISTORY OF TRANSIENT ISCHEMIC ATTACK (TIA), AND CEREBRAL INFARCTION WITHOUT RESIDUAL DEFICITS: Chronic | Status: ACTIVE | Noted: 2023-06-21

## 2023-07-05 DIAGNOSIS — E66.9 OBESITY, UNSPECIFIED: ICD-10-CM

## 2023-07-05 DIAGNOSIS — Z88.8 ALLERGY STATUS TO OTHER DRUGS, MEDICAMENTS AND BIOLOGICAL SUBSTANCES: ICD-10-CM

## 2023-07-05 DIAGNOSIS — E86.0 DEHYDRATION: ICD-10-CM

## 2023-07-05 DIAGNOSIS — E83.42 HYPOMAGNESEMIA: ICD-10-CM

## 2023-07-05 DIAGNOSIS — B96.20 UNSPECIFIED ESCHERICHIA COLI [E. COLI] AS THE CAUSE OF DISEASES CLASSIFIED ELSEWHERE: ICD-10-CM

## 2023-07-05 DIAGNOSIS — I13.10 HYPERTENSIVE HEART AND CHRONIC KIDNEY DISEASE WITHOUT HEART FAILURE, WITH STAGE 1 THROUGH STAGE 4 CHRONIC KIDNEY DISEASE, OR UNSPECIFIED CHRONIC KIDNEY DISEASE: ICD-10-CM

## 2023-07-05 DIAGNOSIS — I25.10 ATHEROSCLEROTIC HEART DISEASE OF NATIVE CORONARY ARTERY WITHOUT ANGINA PECTORIS: ICD-10-CM

## 2023-07-05 DIAGNOSIS — J45.909 UNSPECIFIED ASTHMA, UNCOMPLICATED: ICD-10-CM

## 2023-07-05 DIAGNOSIS — E11.40 TYPE 2 DIABETES MELLITUS WITH DIABETIC NEUROPATHY, UNSPECIFIED: ICD-10-CM

## 2023-07-05 DIAGNOSIS — Z91.013 ALLERGY TO SEAFOOD: ICD-10-CM

## 2023-07-05 DIAGNOSIS — Z93.3 COLOSTOMY STATUS: ICD-10-CM

## 2023-07-05 DIAGNOSIS — E11.10 TYPE 2 DIABETES MELLITUS WITH KETOACIDOSIS WITHOUT COMA: ICD-10-CM

## 2023-07-05 DIAGNOSIS — E87.6 HYPOKALEMIA: ICD-10-CM

## 2023-07-05 DIAGNOSIS — N17.9 ACUTE KIDNEY FAILURE, UNSPECIFIED: ICD-10-CM

## 2023-07-05 DIAGNOSIS — E11.22 TYPE 2 DIABETES MELLITUS WITH DIABETIC CHRONIC KIDNEY DISEASE: ICD-10-CM

## 2023-07-05 DIAGNOSIS — N18.9 CHRONIC KIDNEY DISEASE, UNSPECIFIED: ICD-10-CM

## 2023-07-05 DIAGNOSIS — E11.65 TYPE 2 DIABETES MELLITUS WITH HYPERGLYCEMIA: ICD-10-CM

## 2023-07-05 DIAGNOSIS — N39.0 URINARY TRACT INFECTION, SITE NOT SPECIFIED: ICD-10-CM

## 2023-07-05 DIAGNOSIS — Z91.148 PATIENT'S OTHER NONCOMPLIANCE WITH MEDICATION REGIMEN FOR OTHER REASON: ICD-10-CM

## 2023-07-05 DIAGNOSIS — Z79.01 LONG TERM (CURRENT) USE OF ANTICOAGULANTS: ICD-10-CM

## 2023-07-05 DIAGNOSIS — Z79.899 OTHER LONG TERM (CURRENT) DRUG THERAPY: ICD-10-CM

## 2023-07-05 DIAGNOSIS — Z79.4 LONG TERM (CURRENT) USE OF INSULIN: ICD-10-CM

## 2023-07-05 DIAGNOSIS — Z91.041 RADIOGRAPHIC DYE ALLERGY STATUS: ICD-10-CM

## 2023-07-05 DIAGNOSIS — Z86.711 PERSONAL HISTORY OF PULMONARY EMBOLISM: ICD-10-CM

## 2023-07-05 DIAGNOSIS — G89.29 OTHER CHRONIC PAIN: ICD-10-CM

## 2023-07-05 DIAGNOSIS — Z87.440 PERSONAL HISTORY OF URINARY (TRACT) INFECTIONS: ICD-10-CM

## 2023-07-05 DIAGNOSIS — M54.9 DORSALGIA, UNSPECIFIED: ICD-10-CM

## 2023-07-05 DIAGNOSIS — I69.354 HEMIPLEGIA AND HEMIPARESIS FOLLOWING CEREBRAL INFARCTION AFFECTING LEFT NON-DOMINANT SIDE: ICD-10-CM

## 2023-07-05 DIAGNOSIS — R65.10 SYSTEMIC INFLAMMATORY RESPONSE SYNDROME (SIRS) OF NON-INFECTIOUS ORIGIN WITHOUT ACUTE ORGAN DYSFUNCTION: ICD-10-CM

## 2023-07-05 DIAGNOSIS — Z88.0 ALLERGY STATUS TO PENICILLIN: ICD-10-CM

## 2023-07-05 DIAGNOSIS — E78.5 HYPERLIPIDEMIA, UNSPECIFIED: ICD-10-CM

## 2023-07-05 DIAGNOSIS — Z82.49 FAMILY HISTORY OF ISCHEMIC HEART DISEASE AND OTHER DISEASES OF THE CIRCULATORY SYSTEM: ICD-10-CM

## 2023-07-05 DIAGNOSIS — E11.649 TYPE 2 DIABETES MELLITUS WITH HYPOGLYCEMIA WITHOUT COMA: ICD-10-CM

## 2023-07-05 LAB
ISTAT ACTK (ACTIVATED CLOTTING TIME KAOLIN): 143 SEC — HIGH (ref 74–137)
ISTAT ACTK (ACTIVATED CLOTTING TIME KAOLIN): 299 SEC — HIGH (ref 74–137)

## 2023-07-06 NOTE — ED ADULT TRIAGE NOTE - BP NONINVASIVE SYSTOLIC (MM HG)
For information on Fall & Injury Prevention, visit: [email or web address] OR  [email or web address] OR  https://www.cdc.gov/steadi/patient.html 160

## 2023-07-20 PROCEDURE — 84443 ASSAY THYROID STIM HORMONE: CPT

## 2023-07-20 PROCEDURE — 85025 COMPLETE CBC W/AUTO DIFF WBC: CPT

## 2023-07-20 PROCEDURE — 76937 US GUIDE VASCULAR ACCESS: CPT

## 2023-07-20 PROCEDURE — 85610 PROTHROMBIN TIME: CPT

## 2023-07-20 PROCEDURE — 80061 LIPID PANEL: CPT

## 2023-07-20 PROCEDURE — 71045 X-RAY EXAM CHEST 1 VIEW: CPT

## 2023-07-20 PROCEDURE — 84484 ASSAY OF TROPONIN QUANT: CPT

## 2023-07-20 PROCEDURE — 80048 BASIC METABOLIC PNL TOTAL CA: CPT

## 2023-07-20 PROCEDURE — 97161 PT EVAL LOW COMPLEX 20 MIN: CPT

## 2023-07-20 PROCEDURE — 80170 ASSAY OF GENTAMICIN: CPT

## 2023-07-20 PROCEDURE — 84100 ASSAY OF PHOSPHORUS: CPT

## 2023-07-20 PROCEDURE — 85730 THROMBOPLASTIN TIME PARTIAL: CPT

## 2023-07-20 PROCEDURE — 94640 AIRWAY INHALATION TREATMENT: CPT

## 2023-07-20 PROCEDURE — 83036 HEMOGLOBIN GLYCOSYLATED A1C: CPT

## 2023-07-20 PROCEDURE — 75574 CT ANGIO HRT W/3D IMAGE: CPT

## 2023-07-20 PROCEDURE — 86850 RBC ANTIBODY SCREEN: CPT

## 2023-07-20 PROCEDURE — 85027 COMPLETE CBC AUTOMATED: CPT

## 2023-07-20 PROCEDURE — 87040 BLOOD CULTURE FOR BACTERIA: CPT

## 2023-07-20 PROCEDURE — 93005 ELECTROCARDIOGRAM TRACING: CPT

## 2023-07-20 PROCEDURE — 51705 CHANGE OF BLADDER TUBE: CPT

## 2023-07-20 PROCEDURE — 96376 TX/PRO/DX INJ SAME DRUG ADON: CPT

## 2023-07-20 PROCEDURE — 87186 SC STD MICRODIL/AGAR DIL: CPT

## 2023-07-20 PROCEDURE — 81001 URINALYSIS AUTO W/SCOPE: CPT

## 2023-07-20 PROCEDURE — 82550 ASSAY OF CK (CPK): CPT

## 2023-07-20 PROCEDURE — 83605 ASSAY OF LACTIC ACID: CPT

## 2023-07-20 PROCEDURE — 82010 KETONE BODYS QUAN: CPT

## 2023-07-20 PROCEDURE — 86901 BLOOD TYPING SEROLOGIC RH(D): CPT

## 2023-07-20 PROCEDURE — 36410 VNPNXR 3YR/> PHY/QHP DX/THER: CPT

## 2023-07-20 PROCEDURE — 86140 C-REACTIVE PROTEIN: CPT

## 2023-07-20 PROCEDURE — C1769: CPT

## 2023-07-20 PROCEDURE — 99291 CRITICAL CARE FIRST HOUR: CPT | Mod: 25

## 2023-07-20 PROCEDURE — 76856 US EXAM PELVIC COMPLETE: CPT

## 2023-07-20 PROCEDURE — 96365 THER/PROPH/DIAG IV INF INIT: CPT

## 2023-07-20 PROCEDURE — 86870 RBC ANTIBODY IDENTIFICATION: CPT

## 2023-07-20 PROCEDURE — 93306 TTE W/DOPPLER COMPLETE: CPT

## 2023-07-20 PROCEDURE — 87086 URINE CULTURE/COLONY COUNT: CPT

## 2023-07-20 PROCEDURE — 86900 BLOOD TYPING SEROLOGIC ABO: CPT

## 2023-07-20 PROCEDURE — 83735 ASSAY OF MAGNESIUM: CPT

## 2023-07-20 PROCEDURE — 74177 CT ABD & PELVIS W/CONTRAST: CPT | Mod: MA

## 2023-07-20 PROCEDURE — 83690 ASSAY OF LIPASE: CPT

## 2023-07-20 PROCEDURE — 71275 CT ANGIOGRAPHY CHEST: CPT | Mod: MA

## 2023-07-20 PROCEDURE — 86880 COOMBS TEST DIRECT: CPT

## 2023-07-20 PROCEDURE — 97165 OT EVAL LOW COMPLEX 30 MIN: CPT

## 2023-07-20 PROCEDURE — 84702 CHORIONIC GONADOTROPIN TEST: CPT

## 2023-07-20 PROCEDURE — 82248 BILIRUBIN DIRECT: CPT

## 2023-07-20 PROCEDURE — 84681 ASSAY OF C-PEPTIDE: CPT

## 2023-07-20 PROCEDURE — C1894: CPT

## 2023-07-20 PROCEDURE — 96375 TX/PRO/DX INJ NEW DRUG ADDON: CPT

## 2023-07-20 PROCEDURE — 36415 COLL VENOUS BLD VENIPUNCTURE: CPT

## 2023-07-20 PROCEDURE — 96374 THER/PROPH/DIAG INJ IV PUSH: CPT

## 2023-07-20 PROCEDURE — 97116 GAIT TRAINING THERAPY: CPT

## 2023-07-20 PROCEDURE — 82553 CREATINE MB FRACTION: CPT

## 2023-07-20 PROCEDURE — 75984 XRAY CONTROL CATHETER CHANGE: CPT

## 2023-07-20 PROCEDURE — 80053 COMPREHEN METABOLIC PANEL: CPT

## 2023-07-20 PROCEDURE — 82962 GLUCOSE BLOOD TEST: CPT

## 2023-07-20 PROCEDURE — 83880 ASSAY OF NATRIURETIC PEPTIDE: CPT

## 2023-07-20 PROCEDURE — 97162 PT EVAL MOD COMPLEX 30 MIN: CPT

## 2023-07-20 PROCEDURE — 85652 RBC SED RATE AUTOMATED: CPT

## 2023-07-20 PROCEDURE — 84132 ASSAY OF SERUM POTASSIUM: CPT

## 2023-07-20 PROCEDURE — C1725: CPT

## 2023-07-20 PROCEDURE — 99291 CRITICAL CARE FIRST HOUR: CPT

## 2023-07-20 PROCEDURE — C1874: CPT

## 2023-07-20 PROCEDURE — 85347 COAGULATION TIME ACTIVATED: CPT

## 2023-07-20 PROCEDURE — C1887: CPT

## 2023-07-20 PROCEDURE — C1729: CPT

## 2023-07-27 VITALS
DIASTOLIC BLOOD PRESSURE: 108 MMHG | WEIGHT: 223.11 LBS | OXYGEN SATURATION: 97 % | SYSTOLIC BLOOD PRESSURE: 174 MMHG | TEMPERATURE: 99 F | HEART RATE: 122 BPM | HEIGHT: 67 IN | RESPIRATION RATE: 18 BRPM

## 2023-07-27 LAB
ALBUMIN SERPL ELPH-MCNC: 3.7 G/DL — SIGNIFICANT CHANGE UP (ref 3.3–5)
ALP SERPL-CCNC: 196 U/L — HIGH (ref 40–120)
ALT FLD-CCNC: 12 U/L — SIGNIFICANT CHANGE UP (ref 10–45)
ANION GAP SERPL CALC-SCNC: 16 MMOL/L — SIGNIFICANT CHANGE UP (ref 5–17)
AST SERPL-CCNC: 15 U/L — SIGNIFICANT CHANGE UP (ref 10–40)
B-OH-BUTYR SERPL-SCNC: 0.2 MMOL/L — SIGNIFICANT CHANGE UP
BASE EXCESS BLDV CALC-SCNC: 5.4 MMOL/L — HIGH (ref -2–3)
BASOPHILS # BLD AUTO: 0.04 K/UL — SIGNIFICANT CHANGE UP (ref 0–0.2)
BASOPHILS NFR BLD AUTO: 0.3 % — SIGNIFICANT CHANGE UP (ref 0–2)
BILIRUB SERPL-MCNC: 0.2 MG/DL — SIGNIFICANT CHANGE UP (ref 0.2–1.2)
BUN SERPL-MCNC: 15 MG/DL — SIGNIFICANT CHANGE UP (ref 7–23)
CA-I SERPL-SCNC: 1.17 MMOL/L — SIGNIFICANT CHANGE UP (ref 1.15–1.33)
CALCIUM SERPL-MCNC: 9.4 MG/DL — SIGNIFICANT CHANGE UP (ref 8.4–10.5)
CHLORIDE SERPL-SCNC: 93 MMOL/L — LOW (ref 96–108)
CO2 BLDV-SCNC: 30.2 MMOL/L — HIGH (ref 22–26)
CO2 SERPL-SCNC: 26 MMOL/L — SIGNIFICANT CHANGE UP (ref 22–31)
CREAT SERPL-MCNC: 0.81 MG/DL — SIGNIFICANT CHANGE UP (ref 0.5–1.3)
EGFR: 92 ML/MIN/1.73M2 — SIGNIFICANT CHANGE UP
EOSINOPHIL # BLD AUTO: 0.08 K/UL — SIGNIFICANT CHANGE UP (ref 0–0.5)
EOSINOPHIL NFR BLD AUTO: 0.7 % — SIGNIFICANT CHANGE UP (ref 0–6)
GAS PNL BLDV: 133 MMOL/L — LOW (ref 136–145)
GAS PNL BLDV: SIGNIFICANT CHANGE UP
GAS PNL BLDV: SIGNIFICANT CHANGE UP
GLUCOSE SERPL-MCNC: 316 MG/DL — HIGH (ref 70–99)
HCG SERPL-ACNC: <0 MIU/ML — SIGNIFICANT CHANGE UP
HCO3 BLDV-SCNC: 29 MMOL/L — SIGNIFICANT CHANGE UP (ref 22–29)
HCT VFR BLD CALC: 40.6 % — SIGNIFICANT CHANGE UP (ref 34.5–45)
HGB BLD-MCNC: 13 G/DL — SIGNIFICANT CHANGE UP (ref 11.5–15.5)
IMM GRANULOCYTES NFR BLD AUTO: 0.4 % — SIGNIFICANT CHANGE UP (ref 0–0.9)
LACTATE SERPL-SCNC: 3.7 MMOL/L — HIGH (ref 0.5–2)
LIDOCAIN IGE QN: 20 U/L — SIGNIFICANT CHANGE UP (ref 7–60)
LYMPHOCYTES # BLD AUTO: 28.2 % — SIGNIFICANT CHANGE UP (ref 13–44)
LYMPHOCYTES # BLD AUTO: 3.28 K/UL — SIGNIFICANT CHANGE UP (ref 1–3.3)
MCHC RBC-ENTMCNC: 24.4 PG — LOW (ref 27–34)
MCHC RBC-ENTMCNC: 32 GM/DL — SIGNIFICANT CHANGE UP (ref 32–36)
MCV RBC AUTO: 76.3 FL — LOW (ref 80–100)
MONOCYTES # BLD AUTO: 0.72 K/UL — SIGNIFICANT CHANGE UP (ref 0–0.9)
MONOCYTES NFR BLD AUTO: 6.2 % — SIGNIFICANT CHANGE UP (ref 2–14)
NEUTROPHILS # BLD AUTO: 7.47 K/UL — HIGH (ref 1.8–7.4)
NEUTROPHILS NFR BLD AUTO: 64.2 % — SIGNIFICANT CHANGE UP (ref 43–77)
NRBC # BLD: 0 /100 WBCS — SIGNIFICANT CHANGE UP (ref 0–0)
PCO2 BLDV: 38 MMHG — LOW (ref 39–42)
PH BLDV: 7.49 — HIGH (ref 7.32–7.43)
PLATELET # BLD AUTO: 455 K/UL — HIGH (ref 150–400)
PO2 BLDV: 45 MMHG — SIGNIFICANT CHANGE UP (ref 25–45)
POTASSIUM BLDV-SCNC: 2.9 MMOL/L — CRITICAL LOW (ref 3.5–5.1)
POTASSIUM SERPL-MCNC: 3.2 MMOL/L — LOW (ref 3.5–5.3)
POTASSIUM SERPL-SCNC: 3.2 MMOL/L — LOW (ref 3.5–5.3)
PROT SERPL-MCNC: 7.9 G/DL — SIGNIFICANT CHANGE UP (ref 6–8.3)
RBC # BLD: 5.32 M/UL — HIGH (ref 3.8–5.2)
RBC # FLD: 15.4 % — HIGH (ref 10.3–14.5)
SAO2 % BLDV: 79.3 % — SIGNIFICANT CHANGE UP (ref 67–88)
SODIUM SERPL-SCNC: 135 MMOL/L — SIGNIFICANT CHANGE UP (ref 135–145)
WBC # BLD: 11.64 K/UL — HIGH (ref 3.8–10.5)
WBC # FLD AUTO: 11.64 K/UL — HIGH (ref 3.8–10.5)

## 2023-07-27 PROCEDURE — 71045 X-RAY EXAM CHEST 1 VIEW: CPT | Mod: 26

## 2023-07-27 PROCEDURE — 99285 EMERGENCY DEPT VISIT HI MDM: CPT

## 2023-07-27 RX ORDER — MAGNESIUM SULFATE 500 MG/ML
2 VIAL (ML) INJECTION ONCE
Refills: 0 | Status: COMPLETED | OUTPATIENT
Start: 2023-07-27 | End: 2023-07-27

## 2023-07-27 RX ORDER — ACETAMINOPHEN 500 MG
1000 TABLET ORAL ONCE
Refills: 0 | Status: COMPLETED | OUTPATIENT
Start: 2023-07-27 | End: 2023-07-27

## 2023-07-27 RX ORDER — HYDROMORPHONE HYDROCHLORIDE 2 MG/ML
1 INJECTION INTRAMUSCULAR; INTRAVENOUS; SUBCUTANEOUS ONCE
Refills: 0 | Status: DISCONTINUED | OUTPATIENT
Start: 2023-07-27 | End: 2023-07-27

## 2023-07-27 RX ORDER — POTASSIUM CHLORIDE 20 MEQ
40 PACKET (EA) ORAL ONCE
Refills: 0 | Status: COMPLETED | OUTPATIENT
Start: 2023-07-27 | End: 2023-07-27

## 2023-07-27 RX ORDER — SODIUM CHLORIDE 9 MG/ML
1000 INJECTION INTRAMUSCULAR; INTRAVENOUS; SUBCUTANEOUS ONCE
Refills: 0 | Status: COMPLETED | OUTPATIENT
Start: 2023-07-27 | End: 2023-07-27

## 2023-07-27 RX ORDER — SODIUM CHLORIDE 9 MG/ML
1000 INJECTION, SOLUTION INTRAVENOUS ONCE
Refills: 0 | Status: COMPLETED | OUTPATIENT
Start: 2023-07-27 | End: 2023-07-27

## 2023-07-27 RX ORDER — ONDANSETRON 8 MG/1
4 TABLET, FILM COATED ORAL ONCE
Refills: 0 | Status: COMPLETED | OUTPATIENT
Start: 2023-07-27 | End: 2023-07-27

## 2023-07-27 RX ADMIN — Medication 25 GRAM(S): at 23:18

## 2023-07-27 RX ADMIN — Medication 40 MILLIEQUIVALENT(S): at 23:17

## 2023-07-27 RX ADMIN — HYDROMORPHONE HYDROCHLORIDE 1 MILLIGRAM(S): 2 INJECTION INTRAMUSCULAR; INTRAVENOUS; SUBCUTANEOUS at 23:51

## 2023-07-27 RX ADMIN — SODIUM CHLORIDE 1000 MILLILITER(S): 9 INJECTION INTRAMUSCULAR; INTRAVENOUS; SUBCUTANEOUS at 23:51

## 2023-07-27 RX ADMIN — ONDANSETRON 4 MILLIGRAM(S): 8 TABLET, FILM COATED ORAL at 23:51

## 2023-07-27 RX ADMIN — SODIUM CHLORIDE 1000 MILLILITER(S): 9 INJECTION, SOLUTION INTRAVENOUS at 23:37

## 2023-07-27 RX ADMIN — Medication 400 MILLIGRAM(S): at 23:51

## 2023-07-27 NOTE — ED ADULT TRIAGE NOTE - AS HEIGHT TYPE
Ante Partum Progress Note    Siena Mena  34w5d        Patient states she does not have headache  and abdominal pain   Baby moving    Vitals:  Visit Vitals  /82   Pulse 76   Temp 99 °F (37.2 °C)   Resp 16   Ht 5' 5\" (1.651 m)   Wt 208 lb 3.2 oz (94.4 kg)   LMP 2020   SpO2 100%   BMI 34.65 kg/m²     Temp (24hrs), Av.6 °F (37 °C), Min:98.2 °F (36.8 °C), Max:99 °F (37.2 °C)      Last 24hr Input/Output:  No intake or output data in the 24 hours ending 21 1049     Non stress test:  Reactive    Uterine Activity: None     Exam:  Patient without distress.      Abdomen, fundus soft non-tender     Extremities, no redness or tenderness               Additional Exam: Deferred    Labs:     Lab Results   Component Value Date/Time    WBC 19.2 (H) 2021 03:55 AM    WBC 13.4 (H) 2021 09:41 AM    WBC 12.5 (H) 2021 06:51 PM    WBC 11.2 (H) 04/15/2021 02:50 PM    WBC 11.3 (H) 03/10/2021 10:53 AM    WBC 9.1 2020 12:00 AM    WBC 15.1 (H) 2020 11:22 AM    WBC 7.4 2016 09:55 AM    WBC 9.2 2016 03:36 AM    WBC 15.7 (H) 2016 05:17 AM    WBC 19.1 (H) 2016 05:50 AM    WBC 20.1 (H) 2016 06:00 PM    WBC 20.2 (H) 2016 03:42 PM    WBC 6.4 2014 08:53 AM    HGB 10.8 (L) 2021 03:55 AM    HGB 11.3 (L) 2021 09:41 AM    HGB 10.8 (L) 2021 06:51 PM    HGB 10.4 (L) 04/15/2021 02:50 PM    HGB 10.4 (L) 03/10/2021 10:53 AM    HGB 11.2 2020 12:00 AM    HGB 13.3 2020 11:22 AM    HGB 12.3 2016 09:55 AM    HGB 10.1 (L) 2016 03:36 AM    HGB 10.7 (L) 2016 05:17 AM    HGB 11.3 (L) 2016 05:50 AM    HGB 13.2 2016 06:00 PM    HGB 13.6 2016 03:42 PM    HGB 13.0 2014 08:53 AM    HCT 31.8 (L) 2021 03:55 AM    HCT 33.5 (L) 2021 09:41 AM    HCT 32.1 (L) 2021 06:51 PM    HCT 32.2 (L) 04/15/2021 02:50 PM    HCT 31.7 (L) 03/10/2021 10:53 AM    HCT 33.9 (L) 2020 12:00 AM    HCT 39.2 2020 11:22 AM    HCT 36.9 06/02/2016 09:55 AM    HCT 30.4 (L) 01/07/2016 03:36 AM    HCT 32.2 (L) 01/06/2016 05:17 AM    HCT 34.0 (L) 01/05/2016 05:50 AM    HCT 38.9 01/04/2016 06:00 PM    HCT 39.7 01/04/2016 03:42 PM    HCT 39.8 12/11/2014 08:53 AM    PLATELET 572 09/19/8615 03:55 AM    PLATELET 167 58/70/8990 09:41 AM    PLATELET 628 19/93/4926 06:51 PM    PLATELET 409 04/10/2896 02:50 PM    PLATELET 937 58/58/0994 10:53 AM    PLATELET 484 44/22/2747 12:00 AM    PLATELET 527 02/34/7068 11:22 AM    PLATELET 706 22/49/1462 09:55 AM    PLATELET 706 71/33/0992 03:36 AM    PLATELET 817 41/06/4621 05:17 AM    PLATELET 075 49/89/6248 05:50 AM    PLATELET 845 63/25/2742 06:00 PM    PLATELET 192 35/97/1748 03:42 PM    PLATELET 681 61/98/3179 08:53 AM    Hgb, External 11.2 11/06/2020    Hct, External 33.9 11/06/2020    Platelet cnt., External 230 11/06/2020       No results found for this or any previous visit (from the past 24 hour(s)).     Assessment: 34w5d   SIPIH stable on labetalol    Plan:  Continue current plan stated

## 2023-07-27 NOTE — ED ADULT TRIAGE NOTE - CHIEF COMPLAINT QUOTE
multiple medical complains, abdominal pain with vomiting, slight cp, pt diabetes possible  uncontrolled DM,

## 2023-07-27 NOTE — ED ADULT NURSE NOTE - NSFALLHARMRISKINTERV_ED_ALL_ED

## 2023-07-27 NOTE — ED ADULT NURSE NOTE - OBJECTIVE STATEMENT
Received ambulatory with steady gait with walker with chief complaints of abdominal pain x 2 weeks. Pt states "I have pain in my stomach for 2 weeks now, I'm also nauseous and I vomited. This was my symptoms the last time I was septic and I had infection in my urine."     Patient AOX4, speaking full sentences.  Patient denies chest pain, shortness of breath, difficulty breathing and any form of distress not noted.  Resps even and nonlabored. Moves all extremities. Patient oriented to ED area. All needs attended. POC reviewed. Fall risk precautions maintained. Purposeful proactive hourly rounding in progress.

## 2023-07-28 ENCOUNTER — INPATIENT (INPATIENT)
Facility: HOSPITAL | Age: 43
LOS: 5 days | Discharge: HOME HEALTH | DRG: 982 | End: 2023-08-03
Attending: STUDENT IN AN ORGANIZED HEALTH CARE EDUCATION/TRAINING PROGRAM | Admitting: INTERNAL MEDICINE
Payer: MEDICAID

## 2023-07-28 DIAGNOSIS — L03.90 CELLULITIS, UNSPECIFIED: ICD-10-CM

## 2023-07-28 DIAGNOSIS — R19.7 DIARRHEA, UNSPECIFIED: ICD-10-CM

## 2023-07-28 DIAGNOSIS — Z98.89 OTHER SPECIFIED POSTPROCEDURAL STATES: Chronic | ICD-10-CM

## 2023-07-28 DIAGNOSIS — R30.0 DYSURIA: ICD-10-CM

## 2023-07-28 DIAGNOSIS — Z29.9 ENCOUNTER FOR PROPHYLACTIC MEASURES, UNSPECIFIED: ICD-10-CM

## 2023-07-28 DIAGNOSIS — E11.9 TYPE 2 DIABETES MELLITUS WITHOUT COMPLICATIONS: ICD-10-CM

## 2023-07-28 DIAGNOSIS — Z98.890 OTHER SPECIFIED POSTPROCEDURAL STATES: Chronic | ICD-10-CM

## 2023-07-28 DIAGNOSIS — Z86.711 PERSONAL HISTORY OF PULMONARY EMBOLISM: ICD-10-CM

## 2023-07-28 DIAGNOSIS — I25.10 ATHEROSCLEROTIC HEART DISEASE OF NATIVE CORONARY ARTERY WITHOUT ANGINA PECTORIS: ICD-10-CM

## 2023-07-28 DIAGNOSIS — Z93.9 ARTIFICIAL OPENING STATUS, UNSPECIFIED: Chronic | ICD-10-CM

## 2023-07-28 DIAGNOSIS — R79.89 OTHER SPECIFIED ABNORMAL FINDINGS OF BLOOD CHEMISTRY: ICD-10-CM

## 2023-07-28 DIAGNOSIS — K94.09 OTHER COMPLICATIONS OF COLOSTOMY: ICD-10-CM

## 2023-07-28 LAB
ALBUMIN SERPL ELPH-MCNC: 2.5 G/DL — LOW (ref 3.3–5)
ALP SERPL-CCNC: 156 U/L — HIGH (ref 40–120)
ALT FLD-CCNC: 8 U/L — LOW (ref 10–45)
ANION GAP SERPL CALC-SCNC: 14 MMOL/L — SIGNIFICANT CHANGE UP (ref 5–17)
APPEARANCE UR: CLEAR — SIGNIFICANT CHANGE UP
AST SERPL-CCNC: 12 U/L — SIGNIFICANT CHANGE UP (ref 10–40)
BACTERIA # UR AUTO: SIGNIFICANT CHANGE UP /HPF
BASOPHILS # BLD AUTO: 0.02 K/UL — SIGNIFICANT CHANGE UP (ref 0–0.2)
BASOPHILS NFR BLD AUTO: 0.3 % — SIGNIFICANT CHANGE UP (ref 0–2)
BILIRUB SERPL-MCNC: <0.2 MG/DL — SIGNIFICANT CHANGE UP (ref 0.2–1.2)
BILIRUB UR-MCNC: NEGATIVE — SIGNIFICANT CHANGE UP
BUN SERPL-MCNC: 9 MG/DL — SIGNIFICANT CHANGE UP (ref 7–23)
CALCIUM SERPL-MCNC: 7.8 MG/DL — LOW (ref 8.4–10.5)
CHLORIDE SERPL-SCNC: 99 MMOL/L — SIGNIFICANT CHANGE UP (ref 96–108)
CO2 SERPL-SCNC: 20 MMOL/L — LOW (ref 22–31)
COLOR SPEC: YELLOW — SIGNIFICANT CHANGE UP
COMMENT - URINE: SIGNIFICANT CHANGE UP
CREAT SERPL-MCNC: 0.61 MG/DL — SIGNIFICANT CHANGE UP (ref 0.5–1.3)
DIFF PNL FLD: ABNORMAL
EGFR: 114 ML/MIN/1.73M2 — SIGNIFICANT CHANGE UP
EOSINOPHIL # BLD AUTO: 0.09 K/UL — SIGNIFICANT CHANGE UP (ref 0–0.5)
EOSINOPHIL NFR BLD AUTO: 1.1 % — SIGNIFICANT CHANGE UP (ref 0–6)
EPI CELLS # UR: ABNORMAL /HPF (ref 0–5)
GLUCOSE BLDC GLUCOMTR-MCNC: 152 MG/DL — HIGH (ref 70–99)
GLUCOSE BLDC GLUCOMTR-MCNC: 234 MG/DL — HIGH (ref 70–99)
GLUCOSE BLDC GLUCOMTR-MCNC: 270 MG/DL — HIGH (ref 70–99)
GLUCOSE BLDC GLUCOMTR-MCNC: 312 MG/DL — HIGH (ref 70–99)
GLUCOSE BLDC GLUCOMTR-MCNC: 322 MG/DL — HIGH (ref 70–99)
GLUCOSE SERPL-MCNC: 341 MG/DL — HIGH (ref 70–99)
GLUCOSE UR QL: >=1000
GRAM STN FLD: SIGNIFICANT CHANGE UP
HCG UR QL: NEGATIVE — SIGNIFICANT CHANGE UP
HCT VFR BLD CALC: 34.5 % — SIGNIFICANT CHANGE UP (ref 34.5–45)
HGB BLD-MCNC: 10.5 G/DL — LOW (ref 11.5–15.5)
IMM GRANULOCYTES NFR BLD AUTO: 0.4 % — SIGNIFICANT CHANGE UP (ref 0–0.9)
KETONES UR-MCNC: NEGATIVE — SIGNIFICANT CHANGE UP
LACTATE SERPL-SCNC: 1.5 MMOL/L — SIGNIFICANT CHANGE UP (ref 0.5–2)
LACTATE SERPL-SCNC: 2.9 MMOL/L — HIGH (ref 0.5–2)
LEUKOCYTE ESTERASE UR-ACNC: NEGATIVE — SIGNIFICANT CHANGE UP
LYMPHOCYTES # BLD AUTO: 2.05 K/UL — SIGNIFICANT CHANGE UP (ref 1–3.3)
LYMPHOCYTES # BLD AUTO: 25.7 % — SIGNIFICANT CHANGE UP (ref 13–44)
MAGNESIUM SERPL-MCNC: 1.6 MG/DL — SIGNIFICANT CHANGE UP (ref 1.6–2.6)
MCHC RBC-ENTMCNC: 23.9 PG — LOW (ref 27–34)
MCHC RBC-ENTMCNC: 30.4 GM/DL — LOW (ref 32–36)
MCV RBC AUTO: 78.4 FL — LOW (ref 80–100)
MONOCYTES # BLD AUTO: 0.45 K/UL — SIGNIFICANT CHANGE UP (ref 0–0.9)
MONOCYTES NFR BLD AUTO: 5.6 % — SIGNIFICANT CHANGE UP (ref 2–14)
NEUTROPHILS # BLD AUTO: 5.34 K/UL — SIGNIFICANT CHANGE UP (ref 1.8–7.4)
NEUTROPHILS NFR BLD AUTO: 66.9 % — SIGNIFICANT CHANGE UP (ref 43–77)
NITRITE UR-MCNC: NEGATIVE — SIGNIFICANT CHANGE UP
NRBC # BLD: 0 /100 WBCS — SIGNIFICANT CHANGE UP (ref 0–0)
PH UR: 6.5 — SIGNIFICANT CHANGE UP (ref 5–8)
PHOSPHATE SERPL-MCNC: 2 MG/DL — LOW (ref 2.5–4.5)
PLATELET # BLD AUTO: 356 K/UL — SIGNIFICANT CHANGE UP (ref 150–400)
POTASSIUM SERPL-MCNC: 3.3 MMOL/L — LOW (ref 3.5–5.3)
POTASSIUM SERPL-SCNC: 3.3 MMOL/L — LOW (ref 3.5–5.3)
PROT SERPL-MCNC: 6.1 G/DL — SIGNIFICANT CHANGE UP (ref 6–8.3)
PROT UR-MCNC: ABNORMAL MG/DL
RBC # BLD: 4.4 M/UL — SIGNIFICANT CHANGE UP (ref 3.8–5.2)
RBC # FLD: 15.5 % — HIGH (ref 10.3–14.5)
RBC CASTS # UR COMP ASSIST: > 10 /HPF
SODIUM SERPL-SCNC: 133 MMOL/L — LOW (ref 135–145)
SP GR SPEC: 1.02 — SIGNIFICANT CHANGE UP (ref 1–1.03)
SPECIMEN SOURCE: SIGNIFICANT CHANGE UP
UROBILINOGEN FLD QL: 0.2 E.U./DL — SIGNIFICANT CHANGE UP
WBC # BLD: 7.98 K/UL — SIGNIFICANT CHANGE UP (ref 3.8–10.5)
WBC # FLD AUTO: 7.98 K/UL — SIGNIFICANT CHANGE UP (ref 3.8–10.5)
WBC UR QL: < 5 /HPF — SIGNIFICANT CHANGE UP

## 2023-07-28 PROCEDURE — 99223 1ST HOSP IP/OBS HIGH 75: CPT

## 2023-07-28 RX ORDER — MAGNESIUM SULFATE 500 MG/ML
2 VIAL (ML) INJECTION ONCE
Refills: 0 | Status: DISCONTINUED | OUTPATIENT
Start: 2023-07-28 | End: 2023-07-28

## 2023-07-28 RX ORDER — DEXTROSE 50 % IN WATER 50 %
25 SYRINGE (ML) INTRAVENOUS ONCE
Refills: 0 | Status: DISCONTINUED | OUTPATIENT
Start: 2023-07-28 | End: 2023-08-03

## 2023-07-28 RX ORDER — POTASSIUM PHOSPHATE, MONOBASIC POTASSIUM PHOSPHATE, DIBASIC 236; 224 MG/ML; MG/ML
30 INJECTION, SOLUTION INTRAVENOUS ONCE
Refills: 0 | Status: COMPLETED | OUTPATIENT
Start: 2023-07-28 | End: 2023-07-28

## 2023-07-28 RX ORDER — ERTAPENEM SODIUM 1 G/1
1000 INJECTION, POWDER, LYOPHILIZED, FOR SOLUTION INTRAMUSCULAR; INTRAVENOUS ONCE
Refills: 0 | Status: COMPLETED | OUTPATIENT
Start: 2023-07-28 | End: 2023-07-28

## 2023-07-28 RX ORDER — OXYCODONE HYDROCHLORIDE 5 MG/1
10 TABLET ORAL EVERY 12 HOURS
Refills: 0 | Status: DISCONTINUED | OUTPATIENT
Start: 2023-07-28 | End: 2023-08-03

## 2023-07-28 RX ORDER — HYDROMORPHONE HYDROCHLORIDE 2 MG/ML
1 INJECTION INTRAMUSCULAR; INTRAVENOUS; SUBCUTANEOUS ONCE
Refills: 0 | Status: DISCONTINUED | OUTPATIENT
Start: 2023-07-28 | End: 2023-07-28

## 2023-07-28 RX ORDER — OXYCODONE HYDROCHLORIDE 5 MG/1
5 TABLET ORAL EVERY 6 HOURS
Refills: 0 | Status: DISCONTINUED | OUTPATIENT
Start: 2023-07-28 | End: 2023-08-03

## 2023-07-28 RX ORDER — INSULIN GLARGINE 100 [IU]/ML
50 INJECTION, SOLUTION SUBCUTANEOUS AT BEDTIME
Refills: 0 | Status: DISCONTINUED | OUTPATIENT
Start: 2023-07-28 | End: 2023-08-03

## 2023-07-28 RX ORDER — SODIUM CHLORIDE 9 MG/ML
1000 INJECTION, SOLUTION INTRAVENOUS
Refills: 0 | Status: DISCONTINUED | OUTPATIENT
Start: 2023-07-28 | End: 2023-08-03

## 2023-07-28 RX ORDER — INSULIN LISPRO 100/ML
VIAL (ML) SUBCUTANEOUS
Refills: 0 | Status: DISCONTINUED | OUTPATIENT
Start: 2023-07-28 | End: 2023-08-03

## 2023-07-28 RX ORDER — GLUCAGON INJECTION, SOLUTION 0.5 MG/.1ML
1 INJECTION, SOLUTION SUBCUTANEOUS ONCE
Refills: 0 | Status: DISCONTINUED | OUTPATIENT
Start: 2023-07-28 | End: 2023-08-03

## 2023-07-28 RX ORDER — PIPERACILLIN AND TAZOBACTAM 4; .5 G/20ML; G/20ML
3.38 INJECTION, POWDER, LYOPHILIZED, FOR SOLUTION INTRAVENOUS ONCE
Refills: 0 | Status: DISCONTINUED | OUTPATIENT
Start: 2023-07-28 | End: 2023-07-28

## 2023-07-28 RX ORDER — SODIUM CHLORIDE 9 MG/ML
1000 INJECTION INTRAMUSCULAR; INTRAVENOUS; SUBCUTANEOUS ONCE
Refills: 0 | Status: COMPLETED | OUTPATIENT
Start: 2023-07-28 | End: 2023-07-28

## 2023-07-28 RX ORDER — INSULIN GLARGINE 100 [IU]/ML
50 INJECTION, SOLUTION SUBCUTANEOUS EVERY MORNING
Refills: 0 | Status: DISCONTINUED | OUTPATIENT
Start: 2023-07-28 | End: 2023-08-03

## 2023-07-28 RX ORDER — MAGNESIUM SULFATE 500 MG/ML
2 VIAL (ML) INJECTION ONCE
Refills: 0 | Status: COMPLETED | OUTPATIENT
Start: 2023-07-28 | End: 2023-07-28

## 2023-07-28 RX ORDER — INSULIN LISPRO 100/ML
25 VIAL (ML) SUBCUTANEOUS
Refills: 0 | Status: DISCONTINUED | OUTPATIENT
Start: 2023-07-28 | End: 2023-08-03

## 2023-07-28 RX ORDER — POTASSIUM CHLORIDE 20 MEQ
40 PACKET (EA) ORAL ONCE
Refills: 0 | Status: COMPLETED | OUTPATIENT
Start: 2023-07-28 | End: 2023-07-28

## 2023-07-28 RX ORDER — DEXTROSE 50 % IN WATER 50 %
15 SYRINGE (ML) INTRAVENOUS ONCE
Refills: 0 | Status: DISCONTINUED | OUTPATIENT
Start: 2023-07-28 | End: 2023-08-03

## 2023-07-28 RX ORDER — DEXTROSE 50 % IN WATER 50 %
12.5 SYRINGE (ML) INTRAVENOUS ONCE
Refills: 0 | Status: DISCONTINUED | OUTPATIENT
Start: 2023-07-28 | End: 2023-08-03

## 2023-07-28 RX ORDER — ONDANSETRON 8 MG/1
4 TABLET, FILM COATED ORAL ONCE
Refills: 0 | Status: COMPLETED | OUTPATIENT
Start: 2023-07-28 | End: 2023-07-28

## 2023-07-28 RX ORDER — APIXABAN 2.5 MG/1
5 TABLET, FILM COATED ORAL EVERY 12 HOURS
Refills: 0 | Status: DISCONTINUED | OUTPATIENT
Start: 2023-07-28 | End: 2023-07-28

## 2023-07-28 RX ORDER — HEPARIN SODIUM 5000 [USP'U]/ML
INJECTION INTRAVENOUS; SUBCUTANEOUS
Qty: 25000 | Refills: 0 | Status: DISCONTINUED | OUTPATIENT
Start: 2023-07-28 | End: 2023-07-30

## 2023-07-28 RX ORDER — METOPROLOL TARTRATE 50 MG
50 TABLET ORAL EVERY 24 HOURS
Refills: 0 | Status: DISCONTINUED | OUTPATIENT
Start: 2023-07-28 | End: 2023-08-03

## 2023-07-28 RX ORDER — CLOPIDOGREL BISULFATE 75 MG/1
75 TABLET, FILM COATED ORAL DAILY
Refills: 0 | Status: DISCONTINUED | OUTPATIENT
Start: 2023-07-28 | End: 2023-08-03

## 2023-07-28 RX ORDER — PIPERACILLIN AND TAZOBACTAM 4; .5 G/20ML; G/20ML
3.38 INJECTION, POWDER, LYOPHILIZED, FOR SOLUTION INTRAVENOUS EVERY 8 HOURS
Refills: 0 | Status: DISCONTINUED | OUTPATIENT
Start: 2023-07-28 | End: 2023-07-28

## 2023-07-28 RX ORDER — PIPERACILLIN AND TAZOBACTAM 4; .5 G/20ML; G/20ML
3.38 INJECTION, POWDER, LYOPHILIZED, FOR SOLUTION INTRAVENOUS ONCE
Refills: 0 | Status: COMPLETED | OUTPATIENT
Start: 2023-07-28 | End: 2023-07-28

## 2023-07-28 RX ORDER — ATORVASTATIN CALCIUM 80 MG/1
40 TABLET, FILM COATED ORAL AT BEDTIME
Refills: 0 | Status: DISCONTINUED | OUTPATIENT
Start: 2023-07-28 | End: 2023-07-31

## 2023-07-28 RX ORDER — ALBUTEROL 90 UG/1
2 AEROSOL, METERED ORAL EVERY 6 HOURS
Refills: 0 | Status: DISCONTINUED | OUTPATIENT
Start: 2023-07-28 | End: 2023-07-29

## 2023-07-28 RX ORDER — GABAPENTIN 400 MG/1
600 CAPSULE ORAL EVERY 8 HOURS
Refills: 0 | Status: DISCONTINUED | OUTPATIENT
Start: 2023-07-28 | End: 2023-08-03

## 2023-07-28 RX ORDER — ACETAMINOPHEN 500 MG
1000 TABLET ORAL ONCE
Refills: 0 | Status: COMPLETED | OUTPATIENT
Start: 2023-07-28 | End: 2023-07-28

## 2023-07-28 RX ORDER — ACETAMINOPHEN 500 MG
650 TABLET ORAL EVERY 6 HOURS
Refills: 0 | Status: DISCONTINUED | OUTPATIENT
Start: 2023-07-28 | End: 2023-08-03

## 2023-07-28 RX ADMIN — INSULIN GLARGINE 50 UNIT(S): 100 INJECTION, SOLUTION SUBCUTANEOUS at 11:00

## 2023-07-28 RX ADMIN — Medication 1000 MILLIGRAM(S): at 00:11

## 2023-07-28 RX ADMIN — SODIUM CHLORIDE 1000 MILLILITER(S): 9 INJECTION INTRAMUSCULAR; INTRAVENOUS; SUBCUTANEOUS at 01:33

## 2023-07-28 RX ADMIN — PIPERACILLIN AND TAZOBACTAM 200 GRAM(S): 4; .5 INJECTION, POWDER, LYOPHILIZED, FOR SOLUTION INTRAVENOUS at 14:42

## 2023-07-28 RX ADMIN — ONDANSETRON 4 MILLIGRAM(S): 8 TABLET, FILM COATED ORAL at 23:09

## 2023-07-28 RX ADMIN — Medication 400 MILLIGRAM(S): at 09:22

## 2023-07-28 RX ADMIN — Medication 2 GRAM(S): at 01:20

## 2023-07-28 RX ADMIN — Medication 2: at 22:36

## 2023-07-28 RX ADMIN — OXYCODONE HYDROCHLORIDE 5 MILLIGRAM(S): 5 TABLET ORAL at 23:06

## 2023-07-28 RX ADMIN — HYDROMORPHONE HYDROCHLORIDE 1 MILLIGRAM(S): 2 INJECTION INTRAMUSCULAR; INTRAVENOUS; SUBCUTANEOUS at 02:50

## 2023-07-28 RX ADMIN — GABAPENTIN 600 MILLIGRAM(S): 400 CAPSULE ORAL at 22:35

## 2023-07-28 RX ADMIN — Medication 25 UNIT(S): at 09:23

## 2023-07-28 RX ADMIN — OXYCODONE HYDROCHLORIDE 5 MILLIGRAM(S): 5 TABLET ORAL at 10:55

## 2023-07-28 RX ADMIN — GABAPENTIN 600 MILLIGRAM(S): 400 CAPSULE ORAL at 13:25

## 2023-07-28 RX ADMIN — HYDROMORPHONE HYDROCHLORIDE 1 MILLIGRAM(S): 2 INJECTION INTRAMUSCULAR; INTRAVENOUS; SUBCUTANEOUS at 00:11

## 2023-07-28 RX ADMIN — INSULIN GLARGINE 50 UNIT(S): 100 INJECTION, SOLUTION SUBCUTANEOUS at 22:36

## 2023-07-28 RX ADMIN — OXYCODONE HYDROCHLORIDE 10 MILLIGRAM(S): 5 TABLET ORAL at 17:09

## 2023-07-28 RX ADMIN — Medication 8: at 13:20

## 2023-07-28 RX ADMIN — Medication 25 UNIT(S): at 13:21

## 2023-07-28 RX ADMIN — ERTAPENEM SODIUM 120 MILLIGRAM(S): 1 INJECTION, POWDER, LYOPHILIZED, FOR SOLUTION INTRAMUSCULAR; INTRAVENOUS at 04:09

## 2023-07-28 RX ADMIN — Medication 40 MILLIEQUIVALENT(S): at 13:26

## 2023-07-28 RX ADMIN — OXYCODONE HYDROCHLORIDE 5 MILLIGRAM(S): 5 TABLET ORAL at 22:36

## 2023-07-28 RX ADMIN — SODIUM CHLORIDE 1000 MILLILITER(S): 9 INJECTION INTRAMUSCULAR; INTRAVENOUS; SUBCUTANEOUS at 02:33

## 2023-07-28 RX ADMIN — Medication 6: at 18:38

## 2023-07-28 RX ADMIN — OXYCODONE HYDROCHLORIDE 5 MILLIGRAM(S): 5 TABLET ORAL at 11:25

## 2023-07-28 RX ADMIN — PIPERACILLIN AND TAZOBACTAM 25 GRAM(S): 4; .5 INJECTION, POWDER, LYOPHILIZED, FOR SOLUTION INTRAVENOUS at 22:37

## 2023-07-28 RX ADMIN — OXYCODONE HYDROCHLORIDE 10 MILLIGRAM(S): 5 TABLET ORAL at 16:39

## 2023-07-28 RX ADMIN — Medication 1000 MILLIGRAM(S): at 09:52

## 2023-07-28 RX ADMIN — APIXABAN 5 MILLIGRAM(S): 2.5 TABLET, FILM COATED ORAL at 18:38

## 2023-07-28 RX ADMIN — APIXABAN 5 MILLIGRAM(S): 2.5 TABLET, FILM COATED ORAL at 07:40

## 2023-07-28 RX ADMIN — Medication 25 GRAM(S): at 13:25

## 2023-07-28 RX ADMIN — Medication 25 UNIT(S): at 18:46

## 2023-07-28 RX ADMIN — SODIUM CHLORIDE 1000 MILLILITER(S): 9 INJECTION INTRAMUSCULAR; INTRAVENOUS; SUBCUTANEOUS at 01:00

## 2023-07-28 RX ADMIN — Medication 8: at 09:23

## 2023-07-28 RX ADMIN — CLOPIDOGREL BISULFATE 75 MILLIGRAM(S): 75 TABLET, FILM COATED ORAL at 13:35

## 2023-07-28 RX ADMIN — HYDROMORPHONE HYDROCHLORIDE 1 MILLIGRAM(S): 2 INJECTION INTRAMUSCULAR; INTRAVENOUS; SUBCUTANEOUS at 03:10

## 2023-07-28 RX ADMIN — SODIUM CHLORIDE 1000 MILLILITER(S): 9 INJECTION, SOLUTION INTRAVENOUS at 00:40

## 2023-07-28 RX ADMIN — ATORVASTATIN CALCIUM 40 MILLIGRAM(S): 80 TABLET, FILM COATED ORAL at 22:44

## 2023-07-28 RX ADMIN — Medication 50 MILLIGRAM(S): at 13:25

## 2023-07-28 NOTE — H&P ADULT - PROBLEM SELECTOR PLAN 8
F: s/p 4L in the ED  E: replete K>4 Mg>2  N: consistent carb  D: eliquis  dispo: [facility]

## 2023-07-28 NOTE — PROGRESS NOTE ADULT - PROBLEM SELECTOR PLAN 2
pt reports ongoing hx of dysuria, which worsened the past few days. Pt has a history of recurrent UTIs, which was attributed to suprapubic catheter. Catheter was removed during previous hospitalization in June  -initial UA negative of WBCs, leuk esterase and nitrites  -given hx of recurrent UTIs and drug resistant bacteria, will obtain repeat UA  -f/u UCx pt reports ongoing hx of dysuria, which worsened the past few days. Pt has a history of recurrent UTIs, which was attributed to suprapubic catheter. Catheter was removed during previous hospitalization in June  Plan:  - Initial UA negative of WBCs, leuk esterase and nitrites  - f/u UCx  - Monitor for fever   - f/u AM CBC

## 2023-07-28 NOTE — H&P ADULT - HISTORY OF PRESENT ILLNESS
42 y/o female with PMHx Asthma, CVA (11/2021; residual L>R weakness), PE (4/2023 on Eliquis), uncontrolled DM-2 (on insulin), HTN, HLD, hx abdominal necrotizing fasciitis (s/p suprapubic catheter, ostomy placement in 11/2021 with intubation from 11-12/2021), hx of frequent UTIs 2/2 to suprapubic catheter, most recent UTI c/b Urosepsis and discharged with midline for Ertapenem, recent cardiac stents now presenting with    Of note, patient recently admitted to cardiology service for r/o severe sepsis. Pt was evaluated by ID at that time and decision was made to monitor off antibiotics. Hospital course included CCTA which showed elevated calcium score. Pt underwent cardiac cath, which showed pRCA stenosis, D2 stenosis    In the ED:  Vitals: T 98.2, HR 75, /76, 98% on RA  Labs: WBC 11.64, Hgb 13, plt 455, K 3.2, Cl 93, lactate 3.7 --> 2.9 --> 1.5  UA: small blood, <5 WBCs, >10RBCs, no bacteria  CXR: no acute infiltrate  EKG:  Interventions: Ertapenem 1g, NS 2L IVB, 1L LR, Ofirmev 1g, Dilaudid 1mg IVPx2, Mg 2g, Zofran 4mg IVP, potassium 80meq   44 y/o female with PMHx Asthma, CVA (11/2021; residual L>R weakness), PE (4/2023 on Eliquis), uncontrolled DM-2 (on insulin), HTN, HLD, hx abdominal necrotizing fasciitis ( ostomy placement in 11/2021 with intubation from 11-12/2021), hx of frequent UTIs 2/2 to suprapubic catheter, most recent UTI c/b Urosepsis and discharged with midline for Ertapenem, recent cardiac stents now presenting due to generally not feeling well and concern for recurrent UTI. Pt reports approximately one week ago, she noticed a pimple on the lateral aspect of her L labia. She states a few days later, the pimple popped and she continued to have purulent drainage from the area and intermittent blood. She then started to develop urinary burning, which is what prompted her to come for evaluation. Pt also notes she has had increased ostomy output for the past few days. Otherwise, no known fevers at home, +intermittent chills, no chest pain, palpitations, shortness of breath, abdominal pain. Of note, patient previously had suprapubic catheter, which was removed at last admission in June due to recurrent UTIs.    In the ED:  Vitals: T 98.2, HR 75, /76, 98% on RA  Labs: WBC 11.64, Hgb 13, plt 455, K 3.2, Cl 93, lactate 3.7 --> 2.9 --> 1.5  UA: small blood, <5 WBCs, >10RBCs, no bacteria  CXR: no acute infiltrates  EKG: pending  Interventions: Ertapenem 1g, NS 2L IVB, 1L LR, Ofirmev 1g, Dilaudid 1mg IVPx2, Mg 2g, Zofran 4mg IVP, potassium 80meq   44 y/o female with PMHx Asthma, CVA (11/2021; residual L>R weakness), PE (4/2023 on Eliquis), uncontrolled DM-2 (on insulin), HTN, HLD, hx abdominal necrotizing fasciitis ( ostomy placement in 11/2021 with intubation from 11-12/2021), hx of frequent UTIs 2/2 to suprapubic catheter, most recent UTI c/b Urosepsis and discharged with midline for Ertapenem, recent cardiac stents now presenting due to generally "not feeling well" and concern for recurrent UTI. Pt reports approximately one week ago, she noticed a pimple on the lateral aspect of her L labia. She states a few days later, the pimple popped and she continued to have purulent drainage from the area and intermittent blood. She then started to develop urinary burning, which is what prompted her to come for evaluation. Pt also notes she has had increased ostomy output for the past few days. Otherwise, no known fevers at home, +intermittent chills, no chest pain, palpitations, shortness of breath, abdominal pain. Of note, patient previously had suprapubic catheter, which was removed at last admission in June due to recurrent UTIs.    In the ED:  Vitals: T 98.2, HR 75, /76, 98% on RA  Labs: WBC 11.64, Hgb 13, plt 455, K 3.2, Cl 93, lactate 3.7 --> 2.9 --> 1.5  UA: small blood, <5 WBCs, >10RBCs, no bacteria  CXR: no acute infiltrates  EKG: pending  Interventions: Ertapenem 1g, NS 2L IVB, 1L LR, Ofirmev 1g, Dilaudid 1mg IVPx2, Mg 2g, Zofran 4mg IVP, potassium 80meq

## 2023-07-28 NOTE — PROGRESS NOTE ADULT - PROBLEM SELECTOR PLAN 1
Pt reports a few days ago, she noticed a pimple to her left labia, which now has progressed to swelling, erythema, tenderness extending from the L labia to inner thigh. States she initially noted purulent and bloody drainage. On physical exam, no purulent drainage noted, however significant area of erythema and warmth  -s/p Ertapenem 1g in the ED, will plan continued treatment with Cefazolin 1g q8h  -if swelling is not improving, can consider CT scan to r/o abscess  -f/u BCx Pt reports a few days ago, she noticed a pimple to her left labia, which now has progressed to swelling, erythema, tenderness extending from the L labia to inner thigh. States she initially noted purulent and bloody drainage.   On physical exam, bloody and purulent drainage noted from site, very tender to palpation and erythematous   s/p Ertapenem 1g in the ED, will plan continued treatment with Cefazolin 1g q8h  Zosyn started on floors, pt tolerating   Plan:   - Will continue Zosyn 3.375 Q8H   - f/u BCx and discharge Cx   - Will do Oxycodone 10mg Q12 hours for breakthrough pain   - Tylenol q6h PRN Q6H for fever   - Continue Gabapentin 600mg Q8H Pt reports a few days ago, she noticed a pimple to her left labia, which now has progressed to swelling, erythema, tenderness extending from the L labia to inner thigh. States she initially noted purulent and bloody drainage.   On physical exam, bloody and purulent drainage noted from site, very tender to palpation and erythematous   s/p Ertapenem 1g in the ED, will plan continued treatment with Cefazolin 1g q8h  Zosyn started on floors, pt tolerating   discharge Cx: gram positive cocci in clusters   Plan:   - Will continue Zosyn 3.375 Q8H   - f/u BCx and discharge Cx   - Will do Oxycodone 10mg Q12 hours for breakthrough pain   - Tylenol q6h PRN Q6H for fever   - Continue Gabapentin 600mg Q8H

## 2023-07-28 NOTE — PROGRESS NOTE ADULT - PROBLEM SELECTOR PLAN 5
Pt presenting with lactate of 3.7, which resolved to 1.5 after receiving IVF. Otherwise does not meet other SIRS criteria. Of note, during previous hospitalization also with elevated serum lactate, which was deemed to not be secondary to infection Pt reports over the last few days she has been having increased ostomy output. No blood or dark stool  - f/u GI PCR    #HypoK  K of 3.2 on admission, may be in setting of diarrhea.   given Kcl 80meq in the ED, repeat BMP in AM

## 2023-07-28 NOTE — H&P ADULT - ATTENDING COMMENTS
Patient is 42 yo woman with IDDM, CVA, SPC, CAD/stents, abdominal wall necrotizing fasciitis, PEs on AC, admitted with purulent collection in her left groin. Patient was afebrile and did not have significant leukocytosis. On PE pt is noted to have 3 by 3cm erythematous area in left groin with central opening and pustular discharged expressed.   On labs pt is persistently hyperglycemic.   ----for groin abscess will broaden antibiotics to include anaerobic coverage. Would culture from the purulent discharge is obtained and send for analysis. No indication for additional imaging at this moment but will follow on clinical improvement closely  ----for hyperglycemia, will cont with long and short acting insulins for the target FS in  100-200s range  ----for complains of increase stoma output (though bag was empty when we examined pt) will follow on GI PCR and ask surgical team to assess the site.

## 2023-07-28 NOTE — H&P ADULT - NSHPREVIEWOFSYSTEMS_GEN_ALL_CORE
REVIEW OF SYSTEMS:    CONSTITUTIONAL: +generalized weakness, +chills  EYES/ENT: No visual changes;  No vertigo or throat pain   NECK: No pain or stiffness  RESPIRATORY: No cough, wheezing, hemoptysis; No shortness of breath  CARDIOVASCULAR: No chest pain or palpitations  GASTROINTESTINAL: No abdominal or epigastric pain. No nausea, vomiting, or hematemesis;+increased ostomy output. No melena or hematochezia.  GENITOURINARY: +burning and frequency  NEUROLOGICAL: No numbness or weakness  SKIN: No itching, rashes REVIEW OF SYSTEMS:    CONSTITUTIONAL: +generalized weakness, +chills  EYES/ENT: No visual changes;  No vertigo or throat pain   NECK: No pain or stiffness  RESPIRATORY: No cough, wheezing, hemoptysis; No shortness of breath  CARDIOVASCULAR: No chest pain or palpitations  GASTROINTESTINAL: No abdominal or epigastric pain. No nausea, vomiting, or hematemesis; +increased ostomy output. No melena or hematochezia.  GENITOURINARY: +burning and frequency  NEUROLOGICAL: No numbness or weakness  SKIN: No itching, rashes

## 2023-07-28 NOTE — ED PROVIDER NOTE - CLINICAL SUMMARY MEDICAL DECISION MAKING FREE TEXT BOX
dysuria, n/v, abd pain, fever, refused rectal temperature. complicated hx of sepsis from UTI in past, not currently with suprapubic catheter any longer  -check labs  -ekg  -cxr  -ivf  -dilaudid, zofran  -tylenol  -cultures

## 2023-07-28 NOTE — H&P ADULT - PROBLEM SELECTOR PLAN 1
Pt reports a few days ago, she noticed a pimple to her left labia, which now has progressed to swelling, erythema, tenderness extending from the L labia to inner thigh. States she initially noted purulent and bloody drainage. On physical exam, no purulent drainage noted, however significant area of erythema and warmth  -s/p Ertapenem 1g in the ED, will plan continued treatment with Cefazolin 1g q8h  -if area is not improving, can consider CT scan to r/o abscess Pt reports a few days ago, she noticed a pimple to her left labia, which now has progressed to swelling, erythema, tenderness extending from the L labia to inner thigh. States she initially noted purulent and bloody drainage. On physical exam, no purulent drainage noted, however significant area of erythema and warmth  -s/p Ertapenem 1g in the ED, will plan continued treatment with Cefazolin 1g q8h  -if swelling is not improving, can consider CT scan to r/o abscess  -f/u BCx

## 2023-07-28 NOTE — H&P ADULT - ASSESSMENT
44 y/o female with PMHx Asthma, CVA (11/2021; residual L>R weakness), PE (4/2023 on Eliquis), uncontrolled DM-2 (on insulin), HTN, HLD, hx abdominal necrotizing fasciitis ( ostomy placement in 11/2021 with intubation from 11-12/2021), hx of frequent UTIs 2/2 to suprapubic catheter, most recent UTI c/b Urosepsis and discharged with midline for Ertapenem, recent cardiac stents now presenting due to generally not feeling well and concern for recurrent UTI, admitted for management of cellulitis.

## 2023-07-28 NOTE — H&P ADULT - PROBLEM SELECTOR PLAN 3
Per previous hospitalization, pt was on Lantus 50u in AM and 50u at bedtime. Also on Lispro 25u before meals  -c/w above as well as   -Hay consult in AM

## 2023-07-28 NOTE — PROGRESS NOTE ADULT - PROBLEM SELECTOR PLAN 8
F: s/p 4L in the ED  E: replete K>4 Mg>2  N: consistent carb  D: eliquis  dispo: [facility] History of bilateral pulmonary emboli in segmental and subsegmental branches on the R and subsegmental branches on the L, diagnosed on CT chest during ED visit on 4/7/23. Home meds: eliquis 5mg BID  - c/w full dose AC

## 2023-07-28 NOTE — PROGRESS NOTE ADULT - PROBLEM SELECTOR PLAN 4
Pt reports over the last few days she has been having increased ostomy output. No blood or dark stool  -f/u GI PCR    #HypoK  K of 3.2 on admission, may be in setting of diarrhea.   given Kcl 80meq in the ED, repeat BMP in AM Per previous hospitalization, pt was on Lantus 50u in AM and 50u at bedtime. Also on Lispro 25u before meals  Plan:  -c/w above as well as mISS

## 2023-07-28 NOTE — CONSULT NOTE ADULT - SUBJECTIVE AND OBJECTIVE BOX
HPI:  44 y/o female with PMHx Asthma, CVA (11/2021; residual L>R weakness), PE (4/2023 on Eliquis), uncontrolled DM-2 (on insulin), HTN, HLD, hx abdominal necrotizing fasciitis ( ostomy placement in 11/2021 with intubation from 11-12/2021), hx of frequent UTIs 2/2 to suprapubic catheter, most recent UTI c/b Urosepsis and discharged with midline for Ertapenem, recent cardiac stents now presenting due to generally "not feeling well" and concern for recurrent UTI. Pt reports approximately one week ago, she noticed a pimple on the lateral aspect of her L labia. She states a few days later, the pimple popped and she continued to have purulent drainage from the area and intermittent blood. She then started to develop urinary burning, which is what prompted her to come for evaluation. Pt also notes she has had increased ostomy output for the past few days. Otherwise, no known fevers at home, +intermittent chills, no chest pain, palpitations, shortness of breath, abdominal pain. Of note, patient previously had suprapubic catheter, which was removed at last admission in June due to recurrent UTIs.    In the ED:  Vitals: T 98.2, HR 75, /76, 98% on RA  Labs: WBC 11.64, Hgb 13, plt 455, K 3.2, Cl 93, lactate 3.7 --> 2.9 --> 1.5  UA: small blood, <5 WBCs, >10RBCs, no bacteria  CXR: no acute infiltrates  EKG: pending  Interventions: Ertapenem 1g, NS 2L IVB, 1L LR, Ofirmev 1g, Dilaudid 1mg IVPx2, Mg 2g, Zofran 4mg IVP, potassium 80meq   (28 Jul 2023 04:16)      GENERAL SURGERY ADDENDUM:  43y F w/ PMHx Asthma, CVA (11/2021; residual L>R weakness), PE (4/2023 on Eliquis), T2DM, HTN, HLD, hx abdominal necrotizing fasciitis s/p diverting colostomy creation (11/2021; [facility]), with intubation from 11-12/2021, hx of frequent UTIs 2/2 to suprapubic catheter, w/ most recently UTI c/b Urosepsis and d/c'd w/ midline for Ertapenem, recent cardiac stents now presenting due to generally not feeling well and concern for recurrent UTI, admitted for management of cellulitis. General surgery consulted for evaluation of colostomy.     Patient reports that over the last week, she has been having loose stools from colostomy. She reports that she has had a few episodes of +n/+v prior to coming to the ED. Yesterday she took imodium 2mg bid and has not had any additional flatus or output since. She denied any hematemesis, or blood in stool. Patient reports that she has noticed a buldge superior to colostomy over the last year. She reports that usually it appears "flat" but over the last week of feeling nauseous and vomiting, she believes it now remains pointing outwards. She reports minimal pain, mostly around left side of the colostomy. Patient reports that occasionally over the last year she has felt sharp pains in her abdomen, that feels like pressure around colostomy, which subsides after a few seconds. Denied any fever, chills, sob or cp.     Of note, upon examination, patient additionally has L. groin swelling w/ active purulent drainage. Mild cellulitis.     Currently, VS wnl. On exam, Colostomy w/out gas or stool. Stoma p/p/p. Parastomal hernia present superior to colostomy, no overlying skin changes or erythema. L. groin with area draining purulence. Prior plastic reconstruction on R. genitals and left thigh. Labs notable for WBC 7 (11), Hb 10.5 (13),  CTAP on 6/21/23, showing fat-containing parastomal hernia, unchanged.       MEDICATIONS:  acetaminophen     Tablet .. 650 milliGRAM(s) Oral every 6 hours PRN  albuterol    90 MICROgram(s) HFA Inhaler 2 Puff(s) Inhalation every 6 hours PRN  apixaban 5 milliGRAM(s) Oral every 12 hours  atorvastatin 40 milliGRAM(s) Oral at bedtime  clopidogrel Tablet 75 milliGRAM(s) Oral daily  dextrose 5%. 1000 milliLiter(s) IV Continuous <Continuous>  dextrose 5%. 1000 milliLiter(s) IV Continuous <Continuous>  dextrose 50% Injectable 25 Gram(s) IV Push once  dextrose 50% Injectable 25 Gram(s) IV Push once  dextrose 50% Injectable 12.5 Gram(s) IV Push once  dextrose Oral Gel 15 Gram(s) Oral once PRN  gabapentin 600 milliGRAM(s) Oral every 8 hours  glucagon  Injectable 1 milliGRAM(s) IntraMuscular once  insulin glargine Injectable (LANTUS) 50 Unit(s) SubCutaneous at bedtime  insulin glargine Injectable (LANTUS) 50 Unit(s) SubCutaneous every morning  insulin lispro (ADMELOG) corrective regimen sliding scale   SubCutaneous Before meals and at bedtime  insulin lispro Injectable (ADMELOG) 25 Unit(s) SubCutaneous three times a day before meals  magnesium sulfate  IVPB 2 Gram(s) IV Intermittent once  metoprolol succinate ER 50 milliGRAM(s) Oral every 24 hours  oxyCODONE    IR 5 milliGRAM(s) Oral every 6 hours PRN  piperacillin/tazobactam IVPB. 3.375 Gram(s) IV Intermittent once  piperacillin/tazobactam IVPB.- 3.375 Gram(s) IV Intermittent once  piperacillin/tazobactam IVPB.. 3.375 Gram(s) IV Intermittent every 8 hours  potassium chloride    Tablet ER 40 milliEquivalent(s) Oral once  potassium phosphate IVPB 30 milliMole(s) IV Intermittent once      Allergies    vancomycin (Anaphylaxis; Hives)  shellfish. (Hives; Anaphylaxis)  iodine containing compounds (Hives; Anaphylaxis)  clindamycin (Pruritus)  amoxicillin (Short breath; Rash)  penicillin (Hives)  fish (Hives; Urticaria)    Intolerances    Bactrim I.V. (Rash (Mod to Severe))      ICU Vital Signs Last 24 Hrs  T(F): 98.8 (07-28-23 @ 05:20), Max: 99.3 (07-27-23 @ 21:38)  HR: 99 (07-28-23 @ 05:20) (75 - 122)  BP: 141/84 (07-28-23 @ 05:20) (141/84 - 174/108)  BP(mean): --  ABP: --  RR: 18 (07-28-23 @ 05:20) (18 - 18)  SpO2: 97% (07-28-23 @ 05:20) (97% - 98%)    General: AAOx3, NAD, WDWN, laying comfortably in bed  Cardio: S1,S2, No MRG, RRR  Pulm: Lungs bilaterally clear to auscultation  Abdomen: Colostomy w/out gas or stool. Stoma p/p/p. Parastomal hernia present superior to colostomy, no overlying skin changes or erythema.   Groin: L. groin with area draining white-creamy purulence. Small area of fluctuance within crease.   Extremities: WWP, peripheral pulses appreciated    LABS:    07-28    133<L>  |  99  |  9   ----------------------------<  341<H>  3.3<L>   |  20<L>  |  0.61    Ca    7.8<L>      28 Jul 2023 11:21  Phos  2.0     07-28  Mg     1.6     07-28    TPro  6.1  /  Alb  2.5<L>  /  TBili  <0.2  /  DBili  x   /  AST  12  /  ALT  8<L>  /  AlkPhos  156<H>  07-28  LIVER FUNCTIONS - ( 28 Jul 2023 11:21 )  Alb: 2.5 g/dL / Pro: 6.1 g/dL / ALK PHOS: 156 U/L / ALT: 8 U/L / AST: 12 U/L / GGT: x                               10.5   7.98  )-----------( 356      ( 28 Jul 2023 11:21 )             34.5     Urinalysis Basic - ( 28 Jul 2023 11:21 )    Color: x / Appearance: x / SG: x / pH: x  Gluc: 341 mg/dL / Ketone: x  / Bili: x / Urobili: x   Blood: x / Protein: x / Nitrite: x   Leuk Esterase: x / RBC: x / WBC x   Sq Epi: x / Non Sq Epi: x / Bacteria: x    CAPILLARY BLOOD GLUCOSE      POCT Blood Glucose.: 322 mg/dL (28 Jul 2023 12:33)  POCT Blood Glucose.: 312 mg/dL (28 Jul 2023 09:08)  POCT Blood Glucose.: 305 mg/dL (27 Jul 2023 21:44)

## 2023-07-28 NOTE — PROGRESS NOTE ADULT - SUBJECTIVE AND OBJECTIVE BOX
** INCOMPLETE **    OVERNIGHT EVENTS:     SUBJECTIVE:    VITAL SIGNS:  Vital Signs Last 24 Hrs  T(C): 37.1 (28 Jul 2023 05:20), Max: 37.4 (27 Jul 2023 21:38)  T(F): 98.8 (28 Jul 2023 05:20), Max: 99.3 (27 Jul 2023 21:38)  HR: 99 (28 Jul 2023 05:20) (75 - 122)  BP: 141/84 (28 Jul 2023 05:20) (141/84 - 174/108)  BP(mean): --  RR: 18 (28 Jul 2023 05:20) (18 - 18)  SpO2: 97% (28 Jul 2023 05:20) (97% - 98%)    Parameters below as of 28 Jul 2023 05:20  Patient On (Oxygen Delivery Method): room air        PHYSICAL EXAM:  General: NAD; speaking in full sentences  HEENT: NC/AT; PERRL; EOMI; MMM  Neck: supple; no JVD  Cardiac: RRR; +S1/S2  Pulm: CTA B/L; no W/R/R  GI: soft, NT/ND, +BS  Extremities: WWP; no edema, clubbing or cyanosis  Vasc: 2+ radial, DP pulses B/L  Neuro: AAOx3; no focal deficits    MEDICATIONS:  MEDICATIONS  (STANDING):  apixaban 5 milliGRAM(s) Oral every 12 hours  atorvastatin 40 milliGRAM(s) Oral at bedtime  clopidogrel Tablet 75 milliGRAM(s) Oral daily  dextrose 5%. 1000 milliLiter(s) (50 mL/Hr) IV Continuous <Continuous>  dextrose 5%. 1000 milliLiter(s) (100 mL/Hr) IV Continuous <Continuous>  dextrose 50% Injectable 25 Gram(s) IV Push once  dextrose 50% Injectable 25 Gram(s) IV Push once  dextrose 50% Injectable 12.5 Gram(s) IV Push once  glucagon  Injectable 1 milliGRAM(s) IntraMuscular once  insulin glargine Injectable (LANTUS) 50 Unit(s) SubCutaneous at bedtime  insulin glargine Injectable (LANTUS) 50 Unit(s) SubCutaneous every morning  insulin lispro (ADMELOG) corrective regimen sliding scale   SubCutaneous Before meals and at bedtime  insulin lispro Injectable (ADMELOG) 25 Unit(s) SubCutaneous three times a day before meals  metoprolol succinate ER 50 milliGRAM(s) Oral every 24 hours    MEDICATIONS  (PRN):  dextrose Oral Gel 15 Gram(s) Oral once PRN Blood Glucose LESS THAN 70 milliGRAM(s)/deciliter      ALLERGIES:  Allergies    vancomycin (Anaphylaxis; Hives)  shellfish. (Hives; Anaphylaxis)  iodine containing compounds (Hives; Anaphylaxis)  clindamycin (Pruritus)  amoxicillin (Short breath; Rash)  penicillin (Hives)  fish (Hives; Urticaria)    Intolerances    Bactrim I.V. (Rash (Mod to Severe))      LABS:                        13.0   11.64 )-----------( 455      ( 27 Jul 2023 22:32 )             40.6     07-27    135  |  93<L>  |  15  ----------------------------<  316<H>  3.2<L>   |  26  |  0.81    Ca    9.4      27 Jul 2023 22:32  Mg     1.7     07-27    TPro  7.9  /  Alb  3.7  /  TBili  0.2  /  DBili  x   /  AST  15  /  ALT  12  /  AlkPhos  196<H>  07-27        RADIOLOGY & ADDITIONAL TESTS: Reviewed. OVERNIGHT EVENTS: Admission to [facility]    SUBJECTIVE:  The pt was seen and examined at the bedside this AM. The pt states that she is in a lot of pain in her inguinal area and near her left labia where the discharge is. The pt states that ever since she first noticed the "pimple" in her left groin area, she has had increasing pain and now has noticed bloody discharge. The patient emphasized the fact that she did not pop the pimple, but it started oozing on its own. Th pt also states that she has been having pain around the ostomy site, sh states that her stools have been softer than usual and more frequent as well. The pt denies any fever, chills, headaches, dizziness at this time     REVIEW OF SYSTEMS:    CONSTITUTIONAL: No weakness, fevers or chills  EYES/ENT: No visual changes;  No vertigo or throat pain   NECK: No pain or stiffness  RESPIRATORY: No cough, wheezing, hemoptysis; No shortness of breath  CARDIOVASCULAR: No chest pain or palpitations  GASTROINTESTINAL: Mild abdominal discomfort, specially around the ostomy area. softer stools   GENITOURINARY: Dysuria, without frequency or hematuria  NEUROLOGICAL: Weakness of her left leg and arm (chronic from prior CVA)  SKIN: No itching, painful left groin region as well as redness that extends to the left labia and anal area.       VITAL SIGNS:  Vital Signs Last 24 Hrs  T(C): 37.1 (28 Jul 2023 05:20), Max: 37.4 (27 Jul 2023 21:38)  T(F): 98.8 (28 Jul 2023 05:20), Max: 99.3 (27 Jul 2023 21:38)  HR: 99 (28 Jul 2023 05:20) (75 - 122)  BP: 141/84 (28 Jul 2023 05:20) (141/84 - 174/108)  BP(mean): --  RR: 18 (28 Jul 2023 05:20) (18 - 18)  SpO2: 97% (28 Jul 2023 05:20) (97% - 98%)    Parameters below as of 28 Jul 2023 05:20  Patient On (Oxygen Delivery Method): room air    PHYSICAL EXAM:  General: Pt visibly uncomfortable in bed, not wanting to move too much 2/2 pain  HEENT: NC/AT; PERRL; EOMI; MMM  Neck: supple; no JVD  Cardiac: RRR; +S1/S2  Pulm: CTA B/L; no W/R/R  GI: soft, NT/ND, +BS  Extremities: WWP; no edema, clubbing or cyanosis  Vasc: 2+ radial, DP pulses B/L  Neuro: AAOx3; no focal deficits    MEDICATIONS:  MEDICATIONS  (STANDING):  apixaban 5 milliGRAM(s) Oral every 12 hours  atorvastatin 40 milliGRAM(s) Oral at bedtime  clopidogrel Tablet 75 milliGRAM(s) Oral daily  dextrose 5%. 1000 milliLiter(s) (50 mL/Hr) IV Continuous <Continuous>  dextrose 5%. 1000 milliLiter(s) (100 mL/Hr) IV Continuous <Continuous>  dextrose 50% Injectable 25 Gram(s) IV Push once  dextrose 50% Injectable 25 Gram(s) IV Push once  dextrose 50% Injectable 12.5 Gram(s) IV Push once  glucagon  Injectable 1 milliGRAM(s) IntraMuscular once  insulin glargine Injectable (LANTUS) 50 Unit(s) SubCutaneous at bedtime  insulin glargine Injectable (LANTUS) 50 Unit(s) SubCutaneous every morning  insulin lispro (ADMELOG) corrective regimen sliding scale   SubCutaneous Before meals and at bedtime  insulin lispro Injectable (ADMELOG) 25 Unit(s) SubCutaneous three times a day before meals  metoprolol succinate ER 50 milliGRAM(s) Oral every 24 hours    MEDICATIONS  (PRN):  dextrose Oral Gel 15 Gram(s) Oral once PRN Blood Glucose LESS THAN 70 milliGRAM(s)/deciliter      ALLERGIES:  Allergies    vancomycin (Anaphylaxis; Hives)  shellfish. (Hives; Anaphylaxis)  iodine containing compounds (Hives; Anaphylaxis)  clindamycin (Pruritus)  amoxicillin (Short breath; Rash)  penicillin (Hives)  fish (Hives; Urticaria)    Intolerances    Bactrim I.V. (Rash (Mod to Severe))      LABS:                        13.0   11.64 )-----------( 455      ( 27 Jul 2023 22:32 )             40.6     07-27    135  |  93<L>  |  15  ----------------------------<  316<H>  3.2<L>   |  26  |  0.81    Ca    9.4      27 Jul 2023 22:32  Mg     1.7     07-27    TPro  7.9  /  Alb  3.7  /  TBili  0.2  /  DBili  x   /  AST  15  /  ALT  12  /  AlkPhos  196<H>  07-27        RADIOLOGY & ADDITIONAL TESTS: Reviewed. OVERNIGHT EVENTS: Admission to [facility]    SUBJECTIVE:  The pt was seen and examined at the bedside this AM. The pt states that she is in a lot of pain in her inguinal area and near her left labia where the discharge is. The pt states that ever since she first noticed the "pimple" in her left groin area, she has had increasing pain and now has noticed bloody discharge. The patient emphasized the fact that she did not pop the pimple, but it started oozing on its own. Th pt also states that she has been having pain around the ostomy site, sh states that her stools have been softer than usual and more frequent as well. The pt denies any fever, chills, headaches, dizziness at this time     REVIEW OF SYSTEMS:    CONSTITUTIONAL: No weakness, fevers or chills  EYES/ENT: No visual changes;  No vertigo or throat pain   NECK: No pain or stiffness  RESPIRATORY: No cough, wheezing, hemoptysis; No shortness of breath  CARDIOVASCULAR: No chest pain or palpitations  GASTROINTESTINAL: Mild abdominal discomfort, specially around the ostomy area. softer stools   GENITOURINARY: Dysuria, without frequency or hematuria  NEUROLOGICAL: Weakness of her left leg and arm (chronic from prior CVA)  SKIN: No itching, painful left groin region as well as redness that extends to the left labia and anal area.       VITAL SIGNS:  Vital Signs Last 24 Hrs  T(C): 37.1 (28 Jul 2023 05:20), Max: 37.4 (27 Jul 2023 21:38)  T(F): 98.8 (28 Jul 2023 05:20), Max: 99.3 (27 Jul 2023 21:38)  HR: 99 (28 Jul 2023 05:20) (75 - 122)  BP: 141/84 (28 Jul 2023 05:20) (141/84 - 174/108)  BP(mean): --  RR: 18 (28 Jul 2023 05:20) (18 - 18)  SpO2: 97% (28 Jul 2023 05:20) (97% - 98%)    Parameters below as of 28 Jul 2023 05:20  Patient On (Oxygen Delivery Method): room air    PHYSICAL EXAM:  General: Pt visibly uncomfortable in bed, not wanting to move too much 2/2 pain  HEENT: PERRL; EOMI; MMM  Neck: supple; no JVD  Cardiac: RRR; +S1/S2, no murmurs or gallops  Pulm: symmetrical chest rises b/l, CTA B/L; no W/R/R  GI: Ostomy in place which is c/d/i, bulge palpated around the ostomy bag consistent with possible hernia, rest of abdomen is soft, ND, +BS  Extremities: WWP; no edema, clubbing or cyanosis  Vasc: 2+ radial, DP pulses B/L  Neuro: AAOx3; no focal deficits  Skin: erythematous, swollen inguinal area with an abscess with bloody and purulent drainage. Very tender to palpation. Left labia is swollen and erythematous without area of drainage visualized. Erythema extends to the perineal area but no drainage visualized in the perineum.     MEDICATIONS:  MEDICATIONS  (STANDING):  apixaban 5 milliGRAM(s) Oral every 12 hours  atorvastatin 40 milliGRAM(s) Oral at bedtime  clopidogrel Tablet 75 milliGRAM(s) Oral daily  dextrose 5%. 1000 milliLiter(s) (50 mL/Hr) IV Continuous <Continuous>  dextrose 5%. 1000 milliLiter(s) (100 mL/Hr) IV Continuous <Continuous>  dextrose 50% Injectable 25 Gram(s) IV Push once  dextrose 50% Injectable 25 Gram(s) IV Push once  dextrose 50% Injectable 12.5 Gram(s) IV Push once  glucagon  Injectable 1 milliGRAM(s) IntraMuscular once  insulin glargine Injectable (LANTUS) 50 Unit(s) SubCutaneous at bedtime  insulin glargine Injectable (LANTUS) 50 Unit(s) SubCutaneous every morning  insulin lispro (ADMELOG) corrective regimen sliding scale   SubCutaneous Before meals and at bedtime  insulin lispro Injectable (ADMELOG) 25 Unit(s) SubCutaneous three times a day before meals  metoprolol succinate ER 50 milliGRAM(s) Oral every 24 hours    MEDICATIONS  (PRN):  dextrose Oral Gel 15 Gram(s) Oral once PRN Blood Glucose LESS THAN 70 milliGRAM(s)/deciliter      ALLERGIES:  Allergies    vancomycin (Anaphylaxis; Hives)  shellfish. (Hives; Anaphylaxis)  iodine containing compounds (Hives; Anaphylaxis)  clindamycin (Pruritus)  amoxicillin (Short breath; Rash)  penicillin (Hives)  fish (Hives; Urticaria)    Intolerances    Bactrim I.V. (Rash (Mod to Severe))      LABS:                        13.0   11.64 )-----------( 455      ( 27 Jul 2023 22:32 )             40.6     07-27    135  |  93<L>  |  15  ----------------------------<  316<H>  3.2<L>   |  26  |  0.81    Ca    9.4      27 Jul 2023 22:32  Mg     1.7     07-27    TPro  7.9  /  Alb  3.7  /  TBili  0.2  /  DBili  x   /  AST  15  /  ALT  12  /  AlkPhos  196<H>  07-27        RADIOLOGY & ADDITIONAL TESTS: Reviewed.

## 2023-07-28 NOTE — ED PROVIDER NOTE - OBJECTIVE STATEMENT
43F hx asthma, cva, PE (on eliquis), dm, htn, high chol, abd nec fasc, c/o feeling unwell for past 2 weeks. states worsening generalized weakness, vomiting and lower abd pain. pt states dysuria. states had suprapubic catheter removed. states urinating, however burning. subjective fevers. took tylenol and motrin without relief. per prior discharge summary 6/23 pt with sepsis form UTI - klebsiella and ecoli. was discharged on ertapenem and gentamicin.

## 2023-07-28 NOTE — CONSULT NOTE ADULT - ASSESSMENT
Assessment:  43y F w/ PMHx Asthma, CVA (11/2021; residual L>R weakness), PE (4/2023 on Eliquis), T2DM, HTN, HLD, hx abdominal necrotizing fasciitis s/p diverting colostomy creation (11/2021), with intubation from 11-12/2021, hx of frequent UTIs 2/2 to suprapubic catheter, w/ most recently UTI c/b Urosepsis and d/c'd w/ midline for Ertapenem, recent cardiac stents now presenting due to generally not feeling well and concern for recurrent UTI, admitted for management of cellulitis. General surgery consulted for evaluation of parastomal hernia just superior to colostomy. Patient without any symptoms of obstruction, w/out any abdominal pain currently, w/out any overlying skin changes or erythema. Left groin with abscess that was actively draining while at bedside.     Plan:  #Parastomal Hernia  - No acute surgical intervention at this time for parastomal hernia.   #L. Groin Abscess  - abscess must be drained but just received dose of eliquis. Must be off Eliquis prior to drainage  - Recommend holding eliquis and starting Heparin drip, until abscess is drained by surgery team  - Team 4c continue to follow, please call with any questions or concerns  Plan discussed with chief resident and attending, Dr. Wetzel.

## 2023-07-28 NOTE — PROGRESS NOTE ADULT - PROBLEM SELECTOR PLAN 6
Pt was admitted to cardiac service during last admission. Pt underwent CCTA on 06/23/2023 revealing Ca score 129 (accuracy of score is limited by image noise), probable severe pRCA stenosis, mod D2 stenosis, non obstructive in remaining coronaries. Pt is s/p cardiac angiogram on 6/26 and is s/p IVUS guided NAT pRCA (85%), NAT RPLS (80%); residual dLCx/OM1 70%, mLAD 50%. Pt was instructed to return for staged PCI of LCx and OM1 only if symptomatic  -c/w Atorvastatin 40, Plavix 75mg PO qD, toprol 50mg PO qD Pt presenting with lactate of 3.7, which resolved to 1.5 after receiving IVF. Otherwise does not meet other SIRS criteria. Of note, during previous hospitalization also with elevated serum lactate, which was deemed to not be secondary to infection

## 2023-07-28 NOTE — H&P ADULT - PROBLEM SELECTOR PLAN 2
pt reports ongoing hx of dysuria, which worsened the past few days. Pt has a history of recurrent UTIs, which was attributed to suprapubic catheter. Catheter was removed during previous hospitalization in June  -initial UA negative of WBCs, leuk esterase and nitrites  -given hx of recurrent UTIs and drug resistant bacteria, will obtain repeat UA pt reports ongoing hx of dysuria, which worsened the past few days. Pt has a history of recurrent UTIs, which was attributed to suprapubic catheter. Catheter was removed during previous hospitalization in June  -initial UA negative of WBCs, leuk esterase and nitrites  -given hx of recurrent UTIs and drug resistant bacteria, will obtain repeat UA  -f/u UCx

## 2023-07-28 NOTE — ED PROVIDER NOTE - PROGRESS NOTE DETAILS
lactate improved with ivf. will prophylactically give ABX as pt with urinary symptoms. will admit to medicine  for continued supportive care

## 2023-07-28 NOTE — PROGRESS NOTE ADULT - PROBLEM SELECTOR PLAN 3
Per previous hospitalization, pt was on Lantus 50u in AM and 50u at bedtime. Also on Lispro 25u before meals  -c/w above as well as   -Hay consult in AM Pt with Hx of abdominal necrotizing fascitis s/p ostomy placement   Pt wqith pain around the ostomy, on exam there is a hernia palpated at the ostomy site, tender to touch  CT Abdomen from 6/21 showing Fat-containing parastomal hernia, unchanged.  Plan:  - Surgery input appreciated   - Monitor ostomy output  - Daily exams

## 2023-07-28 NOTE — H&P ADULT - PROBLEM SELECTOR PLAN 5
Pt presenting with lactate of 3.7, which resolved to 1.5 after receiving IVF. Otherwise does not meet other SIRS criteria. Of note, during previous hospitalization also with elevated serum lactate, which was deemed to not be secondary to infection

## 2023-07-28 NOTE — PATIENT PROFILE ADULT - FUNCTIONAL ASSESSMENT - BASIC MOBILITY 6.
2-calculated by average/Not able to assess (calculate score using [facility] averaging method)

## 2023-07-28 NOTE — PATIENT PROFILE ADULT - FALL HARM RISK - HARM RISK INTERVENTIONS

## 2023-07-28 NOTE — H&P ADULT - PROBLEM SELECTOR PLAN 6
Pt was admitted to cardiac service during last admission. Pt underwent CCTA on 06/23/2023 revealing Ca score 129 (accuracy of score is limited by image noise), probable severe pRCA stenosis, mod D2 stenosis, non obstructive in remaining coronaries. Pt is s/p cardiac angiogram on 6/26 and is s/p IVUS guided NAT pRCA (85%), NAT RPLS (80%); residual dLCx/OM1 70%, mLAD 50%. Pt was instructed to return for staged PCI of LCx and OM1 only if symptomatic  -c/w Atorvastatin 40, Plavix 75mg PO qD, toprol 50mg PO qD

## 2023-07-28 NOTE — H&P ADULT - PROBLEM SELECTOR PLAN 4
Pt reports over the last few days she has been having increased ostomy output. No blood or dark stool  -f/u GI PCR    #HypoK  K of 3.2 on admission, may be in setting of diarrhea.   given Kcl 80meq in the ED, repeat BMP in AM

## 2023-07-28 NOTE — H&P ADULT - NSHPLABSRESULTS_GEN_ALL_CORE
.  LABS:                         13.0   11.64 )-----------( 455      ( 27 Jul 2023 22:32 )             40.6     07-27    135  |  93<L>  |  15  ----------------------------<  316<H>  3.2<L>   |  26  |  0.81    Ca    9.4      27 Jul 2023 22:32  Mg     1.7     07-27    TPro  7.9  /  Alb  3.7  /  TBili  0.2  /  DBili  x   /  AST  15  /  ALT  12  /  AlkPhos  196<H>  07-27      Urinalysis Basic - ( 27 Jul 2023 22:32 )    Color: x / Appearance: x / SG: x / pH: x  Gluc: 316 mg/dL / Ketone: x  / Bili: x / Urobili: x   Blood: x / Protein: x / Nitrite: x   Leuk Esterase: x / RBC: x / WBC x   Sq Epi: x / Non Sq Epi: x / Bacteria: x        Lactate, Blood: 1.5 mmol/L (07-28 @ 02:35)  Lactate, Blood: 2.9 mmol/L (07-28 @ 00:55)  Lactate, Blood: 3.7 mmol/L (07-27 @ 22:32)      RADIOLOGY, EKG & ADDITIONAL TESTS: Reviewed.

## 2023-07-28 NOTE — H&P ADULT - NSHPPHYSICALEXAM_GEN_ALL_CORE
.  VITAL SIGNS:  T(C): 36.7 (07-28-23 @ 03:56), Max: 37.4 (07-27-23 @ 21:38)  T(F): 98 (07-28-23 @ 03:56), Max: 99.3 (07-27-23 @ 21:38)  HR: 77 (07-28-23 @ 03:56) (75 - 122)  BP: 161/90 (07-28-23 @ 03:56) (161/90 - 174/108)  BP(mean): --  RR: 18 (07-28-23 @ 03:56) (18 - 18)  SpO2: 98% (07-28-23 @ 03:56) (97% - 98%)  Wt(kg): --    PHYSICAL EXAM:    Constitutional: WDWN resting comfortably in bed; NAD  Head: NC/AT  Eyes: PERRL, EOMI, anicteric sclera  ENT: no nasal discharge; uvula midline, no oropharyngeal erythema or exudates; MMM  Neck: supple; no JVD or thyromegaly  Respiratory: CTA B/L; no W/R/R, no retractions  Cardiac: +S1/S2; RRR; no M/R/G; PMI non-displaced  Gastrointestinal: abdomen soft, NT/ND; no rebound or guarding; +BSx4  Genitourinary: normal external genitalia  Back: spine midline, no bony tenderness or step-offs; no CVAT B/L  Extremities: WWP, no clubbing or cyanosis; no peripheral edema  Musculoskeletal: NROM x4; no joint swelling, tenderness or erythema  Vascular: 2+ radial, femoral, DP/PT pulses B/L  Dermatologic: skin warm, dry and intact; no rashes, wounds, or scars  Lymphatic: no submandibular or cervical LAD  Neurologic: AAOx3; CNII-XII grossly intact; no focal deficits  Psychiatric: affect and characteristics of appearance, verbalizations, behaviors are appropriate .  VITAL SIGNS:  T(C): 36.7 (07-28-23 @ 03:56), Max: 37.4 (07-27-23 @ 21:38)  T(F): 98 (07-28-23 @ 03:56), Max: 99.3 (07-27-23 @ 21:38)  HR: 77 (07-28-23 @ 03:56) (75 - 122)  BP: 161/90 (07-28-23 @ 03:56) (161/90 - 174/108)  BP(mean): --  RR: 18 (07-28-23 @ 03:56) (18 - 18)  SpO2: 98% (07-28-23 @ 03:56) (97% - 98%)  Wt(kg): --    PHYSICAL EXAM:    Constitutional: WDWN resting comfortably in bed; NAD  Head: NC/AT  Eyes: PERRL, EOMI, anicteric sclera  ENT: no nasal discharge; uvula midline, no oropharyngeal erythema or exudates; MM mildly dry  Neck: supple; no JVD  Respiratory: CTA B/L; no W/R/R  Cardiac: +S1/S2; RRR; no M/R/G  Gastrointestinal: abdomen soft slightly distended, non-tender. +ostomy in place, no stool in bag  Genitourinary: L labia with erythema and swelling, extending to L inner thigh. No purulence or bleeding noted. +warmth and tenderness, no fluctuance  Extremities: WWP,no peripheral edema  Vascular: 2+ radial pulses B/L  Neurologic: AAOx3; no focal deficits  Psychiatric: affect and characteristics of appearance, verbalizations, behaviors are appropriate .  VITAL SIGNS:  T(C): 36.7 (07-28-23 @ 03:56), Max: 37.4 (07-27-23 @ 21:38)  T(F): 98 (07-28-23 @ 03:56), Max: 99.3 (07-27-23 @ 21:38)  HR: 77 (07-28-23 @ 03:56) (75 - 122)  BP: 161/90 (07-28-23 @ 03:56) (161/90 - 174/108)  BP(mean): --  RR: 18 (07-28-23 @ 03:56) (18 - 18)  SpO2: 98% (07-28-23 @ 03:56) (97% - 98%)  Wt(kg): --    PHYSICAL EXAM:    Constitutional: WDWN resting comfortably in bed; NAD  Head: NC/AT  Eyes: PERRL, EOMI, anicteric sclera  ENT: no nasal discharge; no oropharyngeal erythema or exudates; MM mildly dry  Neck: supple; no JVD  Respiratory: CTA B/L; no W/R/R  Cardiac: +S1/S2; RRR; no M/R/G  Gastrointestinal: abdomen soft slightly distended, non-tender. +ostomy in place, no stool in bag  Genitourinary: L labia with erythema and swelling, extending to L inner thigh. No purulence or bleeding noted. +warmth and tenderness, no fluctuance  Extremities: WWP,no peripheral edema  Vascular: 2+ radial pulses B/L  Neurologic: AAOx3; no focal deficits  Psychiatric: affect and characteristics of appearance, verbalizations, behaviors are appropriate

## 2023-07-28 NOTE — H&P ADULT - PROBLEM SELECTOR PLAN 7
History of bilateral pulmonary emboli in segmental and subsegmental branches on the R and subsegmental branches on the L, diagnosed on CT chest during ED visit on 4/7/23. Home meds: eliquis 5mg BID  - c/w full dose AC

## 2023-07-28 NOTE — PROGRESS NOTE ADULT - PROBLEM SELECTOR PLAN 7
History of bilateral pulmonary emboli in segmental and subsegmental branches on the R and subsegmental branches on the L, diagnosed on CT chest during ED visit on 4/7/23. Home meds: eliquis 5mg BID  - c/w full dose AC Pt was admitted to cardiac service during last admission. Pt underwent CCTA on 06/23/2023 revealing Ca score 129 (accuracy of score is limited by image noise), probable severe pRCA stenosis, mod D2 stenosis, non obstructive in remaining coronaries. Pt is s/p cardiac angiogram on 6/26 and is s/p IVUS guided NAT pRCA (85%), NAT RPLS (80%); residual dLCx/OM1 70%, mLAD 50%. Pt was instructed to return for staged PCI of LCx and OM1 only if symptomatic  -c/w Atorvastatin 40, Plavix 75mg PO qD, toprol 50mg PO qD Pt was admitted to cardiac service during last admission. Pt underwent CCTA on 06/23/2023 revealing Ca score 129 (accuracy of score is limited by image noise), probable severe pRCA stenosis, mod D2 stenosis, non obstructive in remaining coronaries. Pt is s/p cardiac angiogram on 6/26 and is s/p IVUS guided NAT pRCA (85%), NAT RPLS (80%); residual dLCx/OM1 70%, mLAD 50%. Pt was instructed to return for staged PCI of LCx and OM1 only if symptomatic  Plan:   -c/w Atorvastatin 40, Plavix 75mg PO qD, toprol 50mg PO qD

## 2023-07-29 LAB
ALBUMIN SERPL ELPH-MCNC: 2.9 G/DL — LOW (ref 3.3–5)
ALP SERPL-CCNC: 135 U/L — HIGH (ref 40–120)
ALT FLD-CCNC: 10 U/L — SIGNIFICANT CHANGE UP (ref 10–45)
ANION GAP SERPL CALC-SCNC: 11 MMOL/L — SIGNIFICANT CHANGE UP (ref 5–17)
APTT BLD: 69.8 SEC — HIGH (ref 24.5–35.6)
APTT BLD: 80.3 SEC — HIGH (ref 24.5–35.6)
APTT BLD: 97.3 SEC — HIGH (ref 24.5–35.6)
AST SERPL-CCNC: 14 U/L — SIGNIFICANT CHANGE UP (ref 10–40)
BASOPHILS # BLD AUTO: 0.02 K/UL — SIGNIFICANT CHANGE UP (ref 0–0.2)
BASOPHILS NFR BLD AUTO: 0.2 % — SIGNIFICANT CHANGE UP (ref 0–2)
BILIRUB SERPL-MCNC: <0.2 MG/DL — SIGNIFICANT CHANGE UP (ref 0.2–1.2)
BUN SERPL-MCNC: 13 MG/DL — SIGNIFICANT CHANGE UP (ref 7–23)
CALCIUM SERPL-MCNC: 8 MG/DL — LOW (ref 8.4–10.5)
CHLORIDE SERPL-SCNC: 101 MMOL/L — SIGNIFICANT CHANGE UP (ref 96–108)
CK MB CFR SERPL CALC: 2.5 NG/ML — SIGNIFICANT CHANGE UP (ref 0–6.7)
CK SERPL-CCNC: 47 U/L — SIGNIFICANT CHANGE UP (ref 25–170)
CO2 SERPL-SCNC: 25 MMOL/L — SIGNIFICANT CHANGE UP (ref 22–31)
CREAT SERPL-MCNC: 0.75 MG/DL — SIGNIFICANT CHANGE UP (ref 0.5–1.3)
EGFR: 101 ML/MIN/1.73M2 — SIGNIFICANT CHANGE UP
EOSINOPHIL # BLD AUTO: 0.1 K/UL — SIGNIFICANT CHANGE UP (ref 0–0.5)
EOSINOPHIL NFR BLD AUTO: 1.2 % — SIGNIFICANT CHANGE UP (ref 0–6)
GLUCOSE BLDC GLUCOMTR-MCNC: 167 MG/DL — HIGH (ref 70–99)
GLUCOSE BLDC GLUCOMTR-MCNC: 234 MG/DL — HIGH (ref 70–99)
GLUCOSE BLDC GLUCOMTR-MCNC: 239 MG/DL — HIGH (ref 70–99)
GLUCOSE BLDC GLUCOMTR-MCNC: 329 MG/DL — HIGH (ref 70–99)
GLUCOSE BLDC GLUCOMTR-MCNC: 81 MG/DL — SIGNIFICANT CHANGE UP (ref 70–99)
GLUCOSE SERPL-MCNC: 220 MG/DL — HIGH (ref 70–99)
HCT VFR BLD CALC: 36.8 % — SIGNIFICANT CHANGE UP (ref 34.5–45)
HGB BLD-MCNC: 11.4 G/DL — LOW (ref 11.5–15.5)
IMM GRANULOCYTES NFR BLD AUTO: 0.6 % — SIGNIFICANT CHANGE UP (ref 0–0.9)
INR BLD: 1.05 — SIGNIFICANT CHANGE UP (ref 0.85–1.18)
LYMPHOCYTES # BLD AUTO: 2.25 K/UL — SIGNIFICANT CHANGE UP (ref 1–3.3)
LYMPHOCYTES # BLD AUTO: 26.3 % — SIGNIFICANT CHANGE UP (ref 13–44)
MAGNESIUM SERPL-MCNC: 1.8 MG/DL — SIGNIFICANT CHANGE UP (ref 1.6–2.6)
MCHC RBC-ENTMCNC: 24.8 PG — LOW (ref 27–34)
MCHC RBC-ENTMCNC: 31 GM/DL — LOW (ref 32–36)
MCV RBC AUTO: 80 FL — SIGNIFICANT CHANGE UP (ref 80–100)
MONOCYTES # BLD AUTO: 0.57 K/UL — SIGNIFICANT CHANGE UP (ref 0–0.9)
MONOCYTES NFR BLD AUTO: 6.7 % — SIGNIFICANT CHANGE UP (ref 2–14)
NEUTROPHILS # BLD AUTO: 5.57 K/UL — SIGNIFICANT CHANGE UP (ref 1.8–7.4)
NEUTROPHILS NFR BLD AUTO: 65 % — SIGNIFICANT CHANGE UP (ref 43–77)
NRBC # BLD: 0 /100 WBCS — SIGNIFICANT CHANGE UP (ref 0–0)
PHOSPHATE SERPL-MCNC: 3.1 MG/DL — SIGNIFICANT CHANGE UP (ref 2.5–4.5)
PLATELET # BLD AUTO: 396 K/UL — SIGNIFICANT CHANGE UP (ref 150–400)
POTASSIUM SERPL-MCNC: 3.5 MMOL/L — SIGNIFICANT CHANGE UP (ref 3.5–5.3)
POTASSIUM SERPL-SCNC: 3.5 MMOL/L — SIGNIFICANT CHANGE UP (ref 3.5–5.3)
PROT SERPL-MCNC: 6.3 G/DL — SIGNIFICANT CHANGE UP (ref 6–8.3)
PROTHROM AB SERPL-ACNC: 12 SEC — SIGNIFICANT CHANGE UP (ref 9.5–13)
RBC # BLD: 4.6 M/UL — SIGNIFICANT CHANGE UP (ref 3.8–5.2)
RBC # FLD: 15.8 % — HIGH (ref 10.3–14.5)
SODIUM SERPL-SCNC: 137 MMOL/L — SIGNIFICANT CHANGE UP (ref 135–145)
TROPONIN T, HIGH SENSITIVITY RESULT: 25 NG/L — SIGNIFICANT CHANGE UP (ref 0–51)
WBC # BLD: 8.56 K/UL — SIGNIFICANT CHANGE UP (ref 3.8–10.5)
WBC # FLD AUTO: 8.56 K/UL — SIGNIFICANT CHANGE UP (ref 3.8–10.5)

## 2023-07-29 PROCEDURE — 99254 IP/OBS CNSLTJ NEW/EST MOD 60: CPT

## 2023-07-29 RX ORDER — DAPTOMYCIN 500 MG/10ML
600 INJECTION, POWDER, LYOPHILIZED, FOR SOLUTION INTRAVENOUS EVERY 24 HOURS
Refills: 0 | Status: DISCONTINUED | OUTPATIENT
Start: 2023-07-29 | End: 2023-07-31

## 2023-07-29 RX ORDER — POTASSIUM CHLORIDE 20 MEQ
40 PACKET (EA) ORAL ONCE
Refills: 0 | Status: COMPLETED | OUTPATIENT
Start: 2023-07-29 | End: 2023-07-29

## 2023-07-29 RX ORDER — DAPTOMYCIN 500 MG/10ML
600 INJECTION, POWDER, LYOPHILIZED, FOR SOLUTION INTRAVENOUS ONCE
Refills: 0 | Status: DISCONTINUED | OUTPATIENT
Start: 2023-07-29 | End: 2023-07-29

## 2023-07-29 RX ORDER — ERTAPENEM SODIUM 1 G/1
1000 INJECTION, POWDER, LYOPHILIZED, FOR SOLUTION INTRAMUSCULAR; INTRAVENOUS EVERY 24 HOURS
Refills: 0 | Status: DISCONTINUED | OUTPATIENT
Start: 2023-07-29 | End: 2023-07-31

## 2023-07-29 RX ORDER — IPRATROPIUM/ALBUTEROL SULFATE 18-103MCG
3 AEROSOL WITH ADAPTER (GRAM) INHALATION EVERY 6 HOURS
Refills: 0 | Status: DISCONTINUED | OUTPATIENT
Start: 2023-07-29 | End: 2023-08-03

## 2023-07-29 RX ORDER — PIPERACILLIN AND TAZOBACTAM 4; .5 G/20ML; G/20ML
3.38 INJECTION, POWDER, LYOPHILIZED, FOR SOLUTION INTRAVENOUS EVERY 8 HOURS
Refills: 0 | Status: DISCONTINUED | OUTPATIENT
Start: 2023-07-29 | End: 2023-07-29

## 2023-07-29 RX ORDER — ONDANSETRON 8 MG/1
4 TABLET, FILM COATED ORAL ONCE
Refills: 0 | Status: DISCONTINUED | OUTPATIENT
Start: 2023-07-29 | End: 2023-07-29

## 2023-07-29 RX ORDER — ONDANSETRON 8 MG/1
4 TABLET, FILM COATED ORAL ONCE
Refills: 0 | Status: COMPLETED | OUTPATIENT
Start: 2023-07-29 | End: 2023-07-29

## 2023-07-29 RX ADMIN — Medication 2: at 13:22

## 2023-07-29 RX ADMIN — ONDANSETRON 4 MILLIGRAM(S): 8 TABLET, FILM COATED ORAL at 11:29

## 2023-07-29 RX ADMIN — OXYCODONE HYDROCHLORIDE 10 MILLIGRAM(S): 5 TABLET ORAL at 19:04

## 2023-07-29 RX ADMIN — Medication 40 MILLIEQUIVALENT(S): at 09:21

## 2023-07-29 RX ADMIN — Medication 25 UNIT(S): at 13:22

## 2023-07-29 RX ADMIN — INSULIN GLARGINE 50 UNIT(S): 100 INJECTION, SOLUTION SUBCUTANEOUS at 22:47

## 2023-07-29 RX ADMIN — OXYCODONE HYDROCHLORIDE 10 MILLIGRAM(S): 5 TABLET ORAL at 05:54

## 2023-07-29 RX ADMIN — Medication 25 UNIT(S): at 09:19

## 2023-07-29 RX ADMIN — GABAPENTIN 600 MILLIGRAM(S): 400 CAPSULE ORAL at 13:23

## 2023-07-29 RX ADMIN — Medication 4: at 22:33

## 2023-07-29 RX ADMIN — OXYCODONE HYDROCHLORIDE 5 MILLIGRAM(S): 5 TABLET ORAL at 13:53

## 2023-07-29 RX ADMIN — Medication 200 MILLIGRAM(S): at 18:18

## 2023-07-29 RX ADMIN — INSULIN GLARGINE 50 UNIT(S): 100 INJECTION, SOLUTION SUBCUTANEOUS at 09:19

## 2023-07-29 RX ADMIN — Medication 8: at 09:18

## 2023-07-29 RX ADMIN — OXYCODONE HYDROCHLORIDE 5 MILLIGRAM(S): 5 TABLET ORAL at 20:22

## 2023-07-29 RX ADMIN — Medication 3 MILLILITER(S): at 22:32

## 2023-07-29 RX ADMIN — PIPERACILLIN AND TAZOBACTAM 25 GRAM(S): 4; .5 INJECTION, POWDER, LYOPHILIZED, FOR SOLUTION INTRAVENOUS at 09:22

## 2023-07-29 RX ADMIN — HEPARIN SODIUM 1800 UNIT(S)/HR: 5000 INJECTION INTRAVENOUS; SUBCUTANEOUS at 16:28

## 2023-07-29 RX ADMIN — CLOPIDOGREL BISULFATE 75 MILLIGRAM(S): 75 TABLET, FILM COATED ORAL at 13:23

## 2023-07-29 RX ADMIN — ATORVASTATIN CALCIUM 40 MILLIGRAM(S): 80 TABLET, FILM COATED ORAL at 22:33

## 2023-07-29 RX ADMIN — OXYCODONE HYDROCHLORIDE 10 MILLIGRAM(S): 5 TABLET ORAL at 06:24

## 2023-07-29 RX ADMIN — ERTAPENEM SODIUM 120 MILLIGRAM(S): 1 INJECTION, POWDER, LYOPHILIZED, FOR SOLUTION INTRAMUSCULAR; INTRAVENOUS at 16:27

## 2023-07-29 RX ADMIN — OXYCODONE HYDROCHLORIDE 5 MILLIGRAM(S): 5 TABLET ORAL at 13:23

## 2023-07-29 RX ADMIN — Medication 50 MILLIGRAM(S): at 13:23

## 2023-07-29 RX ADMIN — HEPARIN SODIUM 1800 UNIT(S)/HR: 5000 INJECTION INTRAVENOUS; SUBCUTANEOUS at 01:45

## 2023-07-29 RX ADMIN — GABAPENTIN 600 MILLIGRAM(S): 400 CAPSULE ORAL at 22:33

## 2023-07-29 RX ADMIN — OXYCODONE HYDROCHLORIDE 10 MILLIGRAM(S): 5 TABLET ORAL at 18:34

## 2023-07-29 RX ADMIN — OXYCODONE HYDROCHLORIDE 5 MILLIGRAM(S): 5 TABLET ORAL at 19:52

## 2023-07-29 RX ADMIN — GABAPENTIN 600 MILLIGRAM(S): 400 CAPSULE ORAL at 05:54

## 2023-07-29 NOTE — PROGRESS NOTE ADULT - PROBLEM SELECTOR PLAN 4
Per previous hospitalization, pt was on Lantus 50u in AM and 50u at bedtime. Also on Lispro 25u before meals  Glucose >300 on arrival  On ISS   Glucose this AM:   Plan:  -c/w above as well as mISS

## 2023-07-29 NOTE — PROGRESS NOTE ADULT - ASSESSMENT
43y F w/ PMHx Asthma, CVA (11/2021; residual L>R weakness), PE (4/2023 on Eliquis), T2DM, HTN, HLD, hx abdominal necrotizing fasciitis s/p diverting colostomy creation (11/2021), with intubation from 11-12/2021, hx of frequent UTIs 2/2 to suprapubic catheter, w/ most recently UTI c/b Urosepsis and d/c'd w/ midline for Ertapenem, recent cardiac stents now presenting due to generally not feeling well and concern for recurrent UTI, admitted for management of cellulitis. General surgery consulted for evaluation of parastomal hernia just superior to colostomy. Patient without any symptoms of obstruction, w/out any abdominal pain currently, w/out any overlying skin changes or erythema. Left groin with abscess that was actively draining while at bedside.     Plan:  #Parastomal Hernia  - No acute surgical intervention at this time for parastomal hernia.     #L. Groin Abscess  - Plan to drain abscess tomorrow, recommend holding eliquis and starting Heparin drip, until abscess is drained by surgery team  - Team 4c continue to follow, please call with any questions or concerns  Plan discussed with chief resident and attending, Dr. Wetzel.

## 2023-07-29 NOTE — PROGRESS NOTE ADULT - PROBLEM SELECTOR PLAN 5
Pt reports over the last few days she has been having increased ostomy output. No blood or dark stool  - f/u GI PCR    #HypoK  K of 3.2 on admission, may be in setting of diarrhea.   given Kcl 80meq in the ED, repeat BMP in AM

## 2023-07-29 NOTE — PROGRESS NOTE ADULT - PROBLEM SELECTOR PLAN 7
Pt was admitted to cardiac service during last admission. Pt underwent CCTA on 06/23/2023 revealing Ca score 129 (accuracy of score is limited by image noise), probable severe pRCA stenosis, mod D2 stenosis, non obstructive in remaining coronaries. Pt is s/p cardiac angiogram on 6/26 and is s/p IVUS guided NAT pRCA (85%), NAT RPLS (80%); residual dLCx/OM1 70%, mLAD 50%. Pt was instructed to return for staged PCI of LCx and OM1 only if symptomatic  Plan:   -c/w Atorvastatin 40, Plavix 75mg PO qD, toprol 50mg PO qD

## 2023-07-29 NOTE — PROGRESS NOTE ADULT - PROBLEM SELECTOR PLAN 3
Pt with Hx of abdominal necrotizing fascitis s/p ostomy placement   Pt wqith pain around the ostomy, on exam there is a hernia palpated at the ostomy site, tender to touch  CT Abdomen from 6/21 showing Fat-containing parastomal hernia, unchanged.  Surgery following   Plan:  - Surgery states no emergent intervention at this time as it is easily reducible   - Monitor ostomy output  - Daily exams

## 2023-07-29 NOTE — PROGRESS NOTE ADULT - ASSESSMENT
42 y/o female with PMHx Asthma, CVA (11/2021; residual L>R weakness), PE (4/2023 on Eliquis), uncontrolled DM-2 (on insulin), HTN, HLD, hx abdominal necrotizing fasciitis ( ostomy placement in 11/2021 with intubation from 11-12/2021), hx of frequent UTIs 2/2 to suprapubic catheter, most recent UTI c/b Urosepsis and discharged with midline for Ertapenem, recent cardiac stents now presenting due to generally not feeling well and concern for recurrent UTI, admitted for management of cellulitis.

## 2023-07-29 NOTE — PROGRESS NOTE ADULT - PROBLEM SELECTOR PLAN 2
pt reports ongoing hx of dysuria, which worsened the past few days. Pt has a history of recurrent UTIs, which was attributed to suprapubic catheter. Catheter was removed during previous hospitalization in June  Plan:  - Initial UA negative of WBCs, leuk esterase and nitrites  - Monitor for symptoms   - Monitor vitals and daily CBC

## 2023-07-29 NOTE — CONSULT NOTE ADULT - ASSESSMENT
43F h/o CVA in 11/2021 with residual L>R weakness, PE on eliquis, uncontrolled T2DM on insulin, abdominal necrotizing fasciitis s/p ostomy in 11/2021, frequent UTI 2/2 suprapubic cath now removed p/w feeling unwell and purulent drainage and pain at L labia, found to have L labial abscess due to MSSA (culture still in progress) and UTI (UCx grew MSSA and E.faecalis).  Patient reports itchiness from zosyn and has known penicillin allergy.  While pending culture, will switch to dapto/erta combo.  BCx ngtd - so far no e/o MSSA bacteremia    - stop zosyn  - start daptomycin 600mg IV q24h   - start ertapenem 1g IV q24h   - f/u abscess culture and UCx susceptibility   - if patient goes to OR for I&D, please send culture      Team 2 will follow you.  Case d/w primary team.    Stephanie Brandon MD, MS  Infectious Disease attending  work cell [phone]   For any questions during evening/weekend/holiday, please page ID on call

## 2023-07-29 NOTE — PROGRESS NOTE ADULT - SUBJECTIVE AND OBJECTIVE BOX
SUBJECTIVE: Pt seen and examined at the bedside. Doing well and resting comfortably in bed     Vital Signs Last 24 Hrs  T(C): 36.5 (29 Jul 2023 13:20), Max: 37 (29 Jul 2023 05:48)  T(F): 97.7 (29 Jul 2023 13:20), Max: 98.6 (29 Jul 2023 05:48)  HR: 94 (29 Jul 2023 13:20) (88 - 97)  BP: 144/94 (29 Jul 2023 13:20) (129/87 - 148/88)  BP(mean): --  RR: 20 (29 Jul 2023 13:20) (17 - 20)  SpO2: 98% (29 Jul 2023 05:48) (98% - 99%)    Parameters below as of 29 Jul 2023 13:20  Patient On (Oxygen Delivery Method): room air        I&O's Summary    28 Jul 2023 07:01  -  29 Jul 2023 07:00  --------------------------------------------------------  IN: 0 mL / OUT: 900 mL / NET: -900 mL    Physical Exam:  General Appearance: Appears well, NAD, AAOx3  Pulmonary: Nonlabored breathing, no respiratory distress  Cardiovascular: NSR  Abdomen: Soft, nondisteded, colostomy bag in place w/ scant stool,   Groin: LT sided groin with area of erythema and swelling  Extremities: WWP, SCD's in place     LABS:                        11.4   8.56  )-----------( 396      ( 29 Jul 2023 05:30 )             36.8     07-29    137  |  101  |  13  ----------------------------<  220<H>  3.5   |  25  |  0.75    Ca    8.0<L>      29 Jul 2023 05:30  Phos  3.1     07-29  Mg     1.8     07-29    TPro  6.3  /  Alb  2.9<L>  /  TBili  <0.2  /  DBili  x   /  AST  14  /  ALT  10  /  AlkPhos  135<H>  07-29    PT/INR - ( 29 Jul 2023 05:30 )   PT: 12.0 sec;   INR: 1.05          PTT - ( 29 Jul 2023 12:25 )  PTT:97.3 sec  Urinalysis Basic - ( 29 Jul 2023 05:30 )    Color: x / Appearance: x / SG: x / pH: x  Gluc: 220 mg/dL / Ketone: x  / Bili: x / Urobili: x   Blood: x / Protein: x / Nitrite: x   Leuk Esterase: x / RBC: x / WBC x   Sq Epi: x / Non Sq Epi: x / Bacteria: x

## 2023-07-29 NOTE — PROGRESS NOTE ADULT - PROBLEM SELECTOR PLAN 8
History of bilateral pulmonary emboli in segmental and subsegmental branches on the R and subsegmental branches on the L, diagnosed on CT chest during ED visit on 4/7/23. Home meds: Eliquis 5mg BID  No respiratory distress at this time   Plan:  - Held Eliquis for possible debridement   - On Heparin Full AC

## 2023-07-29 NOTE — PROGRESS NOTE ADULT - SUBJECTIVE AND OBJECTIVE BOX
** INCOMPLETE **    OVERNIGHT EVENTS:     SUBJECTIVE:    VITAL SIGNS:  Vital Signs Last 24 Hrs  T(C): 37 (29 Jul 2023 05:48), Max: 37 (28 Jul 2023 13:25)  T(F): 98.6 (29 Jul 2023 05:48), Max: 98.6 (28 Jul 2023 13:25)  HR: 88 (29 Jul 2023 05:48) (88 - 99)  BP: 148/88 (29 Jul 2023 05:48) (129/87 - 156/88)  BP(mean): 111 (28 Jul 2023 13:25) (111 - 111)  RR: 19 (29 Jul 2023 05:48) (17 - 19)  SpO2: 98% (29 Jul 2023 05:48) (95% - 99%)    Parameters below as of 29 Jul 2023 05:48  Patient On (Oxygen Delivery Method): room air        PHYSICAL EXAM:  General: NAD; speaking in full sentences  HEENT: NC/AT; PERRL; EOMI; MMM  Neck: supple; no JVD  Cardiac: RRR; +S1/S2  Pulm: CTA B/L; no W/R/R  GI: soft, NT/ND, +BS  Extremities: WWP; no edema, clubbing or cyanosis  Vasc: 2+ radial, DP pulses B/L  Neuro: AAOx3; no focal deficits    MEDICATIONS:  MEDICATIONS  (STANDING):  atorvastatin 40 milliGRAM(s) Oral at bedtime  clopidogrel Tablet 75 milliGRAM(s) Oral daily  dextrose 5%. 1000 milliLiter(s) (100 mL/Hr) IV Continuous <Continuous>  dextrose 5%. 1000 milliLiter(s) (50 mL/Hr) IV Continuous <Continuous>  dextrose 50% Injectable 25 Gram(s) IV Push once  dextrose 50% Injectable 25 Gram(s) IV Push once  dextrose 50% Injectable 12.5 Gram(s) IV Push once  gabapentin 600 milliGRAM(s) Oral every 8 hours  glucagon  Injectable 1 milliGRAM(s) IntraMuscular once  heparin  Infusion.  Unit(s)/Hr (18 mL/Hr) IV Continuous <Continuous>  insulin glargine Injectable (LANTUS) 50 Unit(s) SubCutaneous every morning  insulin glargine Injectable (LANTUS) 50 Unit(s) SubCutaneous at bedtime  insulin lispro (ADMELOG) corrective regimen sliding scale   SubCutaneous Before meals and at bedtime  insulin lispro Injectable (ADMELOG) 25 Unit(s) SubCutaneous three times a day before meals  metoprolol succinate ER 50 milliGRAM(s) Oral every 24 hours  piperacillin/tazobactam IVPB.. 3.375 Gram(s) IV Intermittent every 8 hours    MEDICATIONS  (PRN):  acetaminophen     Tablet .. 650 milliGRAM(s) Oral every 6 hours PRN Temp greater or equal to 38C (100.4F), Mild Pain (1 - 3)  albuterol    90 MICROgram(s) HFA Inhaler 2 Puff(s) Inhalation every 6 hours PRN Shortness of Breath and/or Wheezing  dextrose Oral Gel 15 Gram(s) Oral once PRN Blood Glucose LESS THAN 70 milliGRAM(s)/deciliter  oxyCODONE    IR 10 milliGRAM(s) Oral every 12 hours PRN breakthrough pain  oxyCODONE    IR 5 milliGRAM(s) Oral every 6 hours PRN Severe Pain (7 - 10)      ALLERGIES:  Allergies    vancomycin (Anaphylaxis; Hives)  shellfish. (Hives; Anaphylaxis)  iodine containing compounds (Hives; Anaphylaxis)  clindamycin (Pruritus)  amoxicillin (Short breath; Rash)  penicillin (Hives)  fish (Hives; Urticaria)    Intolerances    Bactrim I.V. (Rash (Mod to Severe))      LABS:                        10.5   7.98  )-----------( 356      ( 28 Jul 2023 11:21 )             34.5     07-28    133<L>  |  99  |  9   ----------------------------<  341<H>  3.3<L>   |  20<L>  |  0.61    Ca    7.8<L>      28 Jul 2023 11:21  Phos  2.0     07-28  Mg     1.6     07-28    TPro  6.1  /  Alb  2.5<L>  /  TBili  <0.2  /  DBili  x   /  AST  12  /  ALT  8<L>  /  AlkPhos  156<H>  07-28        RADIOLOGY & ADDITIONAL TESTS: Reviewed. OVERNIGHT EVENTS: EDIL    SUBJECTIVE:   The pt was seen and examined at the bedside this AM. The pt states that her pain is improved from yesterday but that she is now nauseous. The pt states that most of her pain is now coming from her abdominal area around the ostomy. She also mentioned that she had chest pain and pressure for which she was unable to sleep because she was afraid that she "would not wake up". This pain is persistent this AM and she is also having pain in her distal left arm after her IV line insertion. The pt states that she believes that the inguinal area is less painful but it is still draining bloody fluid. The pt was also complaining of a mild pressure headache this AM.     ROS: Negative except for above     VITAL SIGNS:  Vital Signs Last 24 Hrs  T(C): 37 (29 Jul 2023 05:48), Max: 37 (28 Jul 2023 13:25)  T(F): 98.6 (29 Jul 2023 05:48), Max: 98.6 (28 Jul 2023 13:25)  HR: 88 (29 Jul 2023 05:48) (88 - 99)  BP: 148/88 (29 Jul 2023 05:48) (129/87 - 156/88)  BP(mean): 111 (28 Jul 2023 13:25) (111 - 111)  RR: 19 (29 Jul 2023 05:48) (17 - 19)  SpO2: 98% (29 Jul 2023 05:48) (95% - 99%)    Parameters below as of 29 Jul 2023 05:48  Patient On (Oxygen Delivery Method): room air        PHYSICAL EXAM:  General: Anxious appearing, tearful about the pain and current diagnosis, speaking in full sentences.  HEENT: Moist mucous membranes   Neck: supple; no JVD  Cardiac: RRR; +S1/S2, not murmur, no gallops   Pulm: bilateral symmetrical chest rises, CTA B/L; no W/R/R, oxygen ORA  GI: soft, NT/ND, +BS, ostomy bag in place c/d/i, hernia palpated and visualized, mild tenderness around it, formed stool visualized  Extremities: No edema, clubbing or cyanosis, small bruise on the left AC after IV placement, no tenderness to palpation  Wound: Inguinal area erythematous, bloody drainage and tender to the touch with palpable abscess, erythema extends to labia and perineum   Neuro: AAOx3; no focal deficits    MEDICATIONS:  MEDICATIONS  (STANDING):  atorvastatin 40 milliGRAM(s) Oral at bedtime  clopidogrel Tablet 75 milliGRAM(s) Oral daily  dextrose 5%. 1000 milliLiter(s) (100 mL/Hr) IV Continuous <Continuous>  dextrose 5%. 1000 milliLiter(s) (50 mL/Hr) IV Continuous <Continuous>  dextrose 50% Injectable 25 Gram(s) IV Push once  dextrose 50% Injectable 25 Gram(s) IV Push once  dextrose 50% Injectable 12.5 Gram(s) IV Push once  gabapentin 600 milliGRAM(s) Oral every 8 hours  glucagon  Injectable 1 milliGRAM(s) IntraMuscular once  heparin  Infusion.  Unit(s)/Hr (18 mL/Hr) IV Continuous <Continuous>  insulin glargine Injectable (LANTUS) 50 Unit(s) SubCutaneous every morning  insulin glargine Injectable (LANTUS) 50 Unit(s) SubCutaneous at bedtime  insulin lispro (ADMELOG) corrective regimen sliding scale   SubCutaneous Before meals and at bedtime  insulin lispro Injectable (ADMELOG) 25 Unit(s) SubCutaneous three times a day before meals  metoprolol succinate ER 50 milliGRAM(s) Oral every 24 hours  piperacillin/tazobactam IVPB.. 3.375 Gram(s) IV Intermittent every 8 hours    MEDICATIONS  (PRN):  acetaminophen     Tablet .. 650 milliGRAM(s) Oral every 6 hours PRN Temp greater or equal to 38C (100.4F), Mild Pain (1 - 3)  albuterol    90 MICROgram(s) HFA Inhaler 2 Puff(s) Inhalation every 6 hours PRN Shortness of Breath and/or Wheezing  dextrose Oral Gel 15 Gram(s) Oral once PRN Blood Glucose LESS THAN 70 milliGRAM(s)/deciliter  oxyCODONE    IR 10 milliGRAM(s) Oral every 12 hours PRN breakthrough pain  oxyCODONE    IR 5 milliGRAM(s) Oral every 6 hours PRN Severe Pain (7 - 10)      ALLERGIES:  Allergies    vancomycin (Anaphylaxis; Hives)  shellfish. (Hives; Anaphylaxis)  iodine containing compounds (Hives; Anaphylaxis)  clindamycin (Pruritus)  amoxicillin (Short breath; Rash)  penicillin (Hives)  fish (Hives; Urticaria)    Intolerances    Bactrim I.V. (Rash (Mod to Severe))      LABS:                        10.5   7.98  )-----------( 356      ( 28 Jul 2023 11:21 )             34.5     07-28    133<L>  |  99  |  9   ----------------------------<  341<H>  3.3<L>   |  20<L>  |  0.61    Ca    7.8<L>      28 Jul 2023 11:21  Phos  2.0     07-28  Mg     1.6     07-28    TPro  6.1  /  Alb  2.5<L>  /  TBili  <0.2  /  DBili  x   /  AST  12  /  ALT  8<L>  /  AlkPhos  156<H>  07-28        RADIOLOGY & ADDITIONAL TESTS: Reviewed.

## 2023-07-29 NOTE — PROGRESS NOTE ADULT - PROBLEM SELECTOR PLAN 1
Purulent   Pt reports a few days ago, she noticed a pimple to her left labia, which now has progressed to swelling, erythema, tenderness extending from the L labia to inner thigh. States she initially noted purulent and bloody drainage.   On physical exam, bloody and purulent drainage noted from site, very tender to palpation and erythematous   s/p Ertapenem 1g in the ED, will plan continued treatment with Cefazolin 1g q8h  Zosyn started on floors, pt tolerating   discharge Cx: gram positive cocci in clusters   BCx: NGTD  Pt  had total of 15mg of oxycodone overnight   Plan:   - Will continue Zosyn 3.375 Q8H   - c/w Oxycodone 10mg for breakthrough pain and Oxycodone 5mg for severe pain  - Tylenol q6h PRN Q6H for fever   - Surgery consulted, will need debridement of the draining abscess, switched AC to heparin for possible procedure Purulent   Pt reports a few days ago, she noticed a pimple to her left labia, which now has progressed to swelling, erythema, tenderness extending from the L labia to inner thigh. States she initially noted purulent and bloody drainage.   On physical exam, bloody and purulent drainage noted from site, very tender to palpation and erythematous   s/p Ertapenem 1g in the ED, will plan continued treatment with Cefazolin 1g q8h  Zosyn started on floors, pt tolerating   discharge Cx: gram positive cocci in clusters   BCx: NGTD  Pt  had total of 15mg of oxycodone overnight  Zosyn from 7/28-7/29   Plan:   - Switched Zosyn 3.375 Q8H for Daptomycin 600mg Q24H per ID recs   - c/w Oxycodone 10mg for breakthrough pain and Oxycodone 5mg for severe pain  - Tylenol q6h PRN Q6H for fever   - Surgery consulted, will need I&D of the abscess, switched AC to heparin for procedure at the bedside tomorrow Purulent   Pt reports a few days ago, she noticed a pimple to her left labia, which now has progressed to swelling, erythema, tenderness extending from the L labia to inner thigh. States she initially noted purulent and bloody drainage.   On physical exam, bloody and purulent drainage noted from site, very tender to palpation and erythematous   s/p Ertapenem 1g in the ED, will plan continued treatment with Cefazolin 1g q8h  Zosyn started on floors, pt tolerating   discharge Cx: gram positive cocci in clusters   BCx: NGTD  Pt  had total of 15mg of oxycodone overnight  Zosyn from 7/28-7/29   Pt with nausea, poor PO intake, gave Zofran in the AM and Tigan in the PM.   Plan  - Started on Daptomycin 600mg Q24H and Ertapenem 1g q24h per ID recs  - c/w Oxycodone 10mg for breakthrough pain and Oxycodone 5mg for severe pain  - Tylenol q6h PRN Q6H for fever   - Surgery consulted, will need I&D of the abscess, switched AC to heparin for procedure at the bedside tomorrow

## 2023-07-29 NOTE — CONSULT NOTE ADULT - SUBJECTIVE AND OBJECTIVE BOX
INFECTIOUS DISEASES INITIAL CONSULT NOTE    HPI:  43F h/o CVA in 2021 with residual L>R weakness, PE on eliquis, uncontrolled T2DM on insulin, abdominal necrotizing fasciitis s/p ostomy in 2021, frequent UTI 2/2 suprapubic cath now removed p/w feeling unwell and purulent drainage and pain at L labia.  Patient was in distress due to pain at L labia and thus history is limited.  About 1 week PTA, she noticed a small pimple on the L labia, which eventually got bigger and started to drain pus.  The area got very painful so she decided to come to the hospital.  Denied fever, but had chills, has nausea due to pain and said vomiting once today.  Denied SOB, cough, abdominal pain.  Thinks also has dysuria.  Labial abscess culture sent.  ID was consulted for abx rec.  Patient is currently on zosyn and patient c/o itchiness from zosyn.  Patient is crying and asking for IV pain med.       PAST MEDICAL & SURGICAL HISTORY:  Essential hypertension      Hyperlipidemia      Obesity      Abdominal hernia      Pre-eclampsia      Pulmonary embolism      Type 2 diabetes mellitus      History of necrotizing fasciitis      History of creation of ostomy      Suprapubic catheter      H/O: CVA (cerebrovascular accident)      Sepsis, unspecified organism      H/O LEEP      History of D&C      H/O:       H/O ventral hernia repair      H/O exploratory laparotomy      History of creation of ostomy            Review of Systems:   Constitutional, eyes, ENT, cardiovascular, respiratory, gastrointestinal, genitourinary, integumentary, neurological, psychiatric and heme/lymph are otherwise negative other than noted above       ANTIBIOTICS:  MEDICATIONS  (STANDING):  albuterol/ipratropium for Nebulization 3 milliLiter(s) Nebulizer every 6 hours  atorvastatin 40 milliGRAM(s) Oral at bedtime  clopidogrel Tablet 75 milliGRAM(s) Oral daily  DAPTOmycin IVPB 600 milliGRAM(s) IV Intermittent every 24 hours  dextrose 5%. 1000 milliLiter(s) (50 mL/Hr) IV Continuous <Continuous>  dextrose 5%. 1000 milliLiter(s) (100 mL/Hr) IV Continuous <Continuous>  dextrose 50% Injectable 25 Gram(s) IV Push once  dextrose 50% Injectable 25 Gram(s) IV Push once  dextrose 50% Injectable 12.5 Gram(s) IV Push once  ertapenem  IVPB 1000 milliGRAM(s) IV Intermittent every 24 hours  gabapentin 600 milliGRAM(s) Oral every 8 hours  glucagon  Injectable 1 milliGRAM(s) IntraMuscular once  heparin  Infusion.  Unit(s)/Hr (18 mL/Hr) IV Continuous <Continuous>  insulin glargine Injectable (LANTUS) 50 Unit(s) SubCutaneous at bedtime  insulin glargine Injectable (LANTUS) 50 Unit(s) SubCutaneous every morning  insulin lispro (ADMELOG) corrective regimen sliding scale   SubCutaneous Before meals and at bedtime  insulin lispro Injectable (ADMELOG) 25 Unit(s) SubCutaneous three times a day before meals  metoprolol succinate ER 50 milliGRAM(s) Oral every 24 hours    MEDICATIONS  (PRN):  acetaminophen     Tablet .. 650 milliGRAM(s) Oral every 6 hours PRN Temp greater or equal to 38C (100.4F), Mild Pain (1 - 3)  dextrose Oral Gel 15 Gram(s) Oral once PRN Blood Glucose LESS THAN 70 milliGRAM(s)/deciliter  oxyCODONE    IR 5 milliGRAM(s) Oral every 6 hours PRN Severe Pain (7 - 10)  oxyCODONE    IR 10 milliGRAM(s) Oral every 12 hours PRN breakthrough pain      Allergies    vancomycin (Anaphylaxis; Hives)  shellfish. (Hives; Anaphylaxis)  iodine containing compounds (Hives; Anaphylaxis)  clindamycin (Pruritus)  amoxicillin (Short breath; Rash)  penicillin (Hives)  fish (Hives; Urticaria)    Intolerances    Bactrim I.V. (Rash (Mod to Severe))      SOCIAL HISTORY:    FAMILY HISTORY:  FH: diabetes mellitus  father and mother    FH: hypertension  father and mother     no FH leading to current infection    Vital Signs Last 24 Hrs  T(C): 36.5 (2023 13:20), Max: 37 (2023 05:48)  T(F): 97.7 (2023 13:20), Max: 98.6 (2023 05:48)  HR: 94 (2023 13:20) (88 - 97)  BP: 144/94 (2023 13:20) (129/87 - 148/88)  BP(mean): --  RR: 20 (2023 13:20) (17 - 20)  SpO2: 98% (2023 05:48) (98% - 99%)    Parameters below as of 2023 13:20  Patient On (Oxygen Delivery Method): room air        23 @ 07:01  -  23 @ 07:00  --------------------------------------------------------  IN: 0 mL / OUT: 900 mL / NET: -900 mL        PHYSICAL EXAM:  Constitutional: acute distress due to pain   Eyes: the sclera and conjunctiva were normal.   ENT: the ears and nose were normal in appearance.   Neck: the appearance of the neck was normal and the neck was supple.   Pulmonary: no respiratory distress and lungs were clear to auscultation bilaterally.   Heart: heart rate was normal and rhythm regular, normal S1 and S2  Abdomen: normal bowel sounds, soft, ostomy bag with stool   GYN: L labia with erythema and swelling, ttp, purulent drainage   Psychiatric: crying      LABS:                        11.4   8.56  )-----------( 396      ( 2023 05:30 )             36.8         137  |  101  |  13  ----------------------------<  220<H>  3.5   |  25  |  0.75    Ca    8.0<L>      2023 05:30  Phos  3.1       Mg     1.8         TPro  6.3  /  Alb  2.9<L>  /  TBili  <0.2  /  DBili  x   /  AST  14  /  ALT  10  /  AlkPhos  135<H>      PT/INR - ( 2023 05:30 )   PT: 12.0 sec;   INR: 1.05          PTT - ( 2023 12:25 )  PTT:97.3 sec  Urinalysis Basic - ( 2023 05:30 )    Color: x / Appearance: x / SG: x / pH: x  Gluc: 220 mg/dL / Ketone: x  / Bili: x / Urobili: x   Blood: x / Protein: x / Nitrite: x   Leuk Esterase: x / RBC: x / WBC x   Sq Epi: x / Non Sq Epi: x / Bacteria: x        MICROBIOLOGY:  23 Abscess L groin: MSSA   BCx ngtd x 2   UCx: 100k MSSA, 100k E. faecalis       RADIOLOGY & ADDITIONAL STUDIES:  23 CXR   IMPRESSION: Clear lungs. No pleural effusion or pneumothorax. Cardiac   silhouette within normal limits. Bones and soft tissues within normal   limits.

## 2023-07-30 LAB
-  CLINDAMYCIN: SIGNIFICANT CHANGE UP
-  ERYTHROMYCIN: SIGNIFICANT CHANGE UP
-  LINEZOLID: SIGNIFICANT CHANGE UP
-  LINEZOLID: SIGNIFICANT CHANGE UP
-  NITROFURANTOIN: SIGNIFICANT CHANGE UP
-  OXACILLIN: SIGNIFICANT CHANGE UP
-  OXACILLIN: SIGNIFICANT CHANGE UP
-  RIFAMPIN: SIGNIFICANT CHANGE UP
-  RIFAMPIN: SIGNIFICANT CHANGE UP
-  TRIMETHOPRIM/SULFAMETHOXAZOLE: SIGNIFICANT CHANGE UP
-  TRIMETHOPRIM/SULFAMETHOXAZOLE: SIGNIFICANT CHANGE UP
-  VANCOMYCIN: SIGNIFICANT CHANGE UP
-  VANCOMYCIN: SIGNIFICANT CHANGE UP
ALBUMIN SERPL ELPH-MCNC: 2.6 G/DL — LOW (ref 3.3–5)
ALP SERPL-CCNC: 144 U/L — HIGH (ref 40–120)
ALT FLD-CCNC: 8 U/L — LOW (ref 10–45)
ANION GAP SERPL CALC-SCNC: 11 MMOL/L — SIGNIFICANT CHANGE UP (ref 5–17)
APTT BLD: 31 SEC — SIGNIFICANT CHANGE UP (ref 24.5–35.6)
APTT BLD: 91 SEC — HIGH (ref 24.5–35.6)
AST SERPL-CCNC: 11 U/L — SIGNIFICANT CHANGE UP (ref 10–40)
BASOPHILS # BLD AUTO: 0.01 K/UL — SIGNIFICANT CHANGE UP (ref 0–0.2)
BASOPHILS NFR BLD AUTO: 0.2 % — SIGNIFICANT CHANGE UP (ref 0–2)
BILIRUB SERPL-MCNC: <0.2 MG/DL — SIGNIFICANT CHANGE UP (ref 0.2–1.2)
BUN SERPL-MCNC: 11 MG/DL — SIGNIFICANT CHANGE UP (ref 7–23)
CALCIUM SERPL-MCNC: 8.4 MG/DL — SIGNIFICANT CHANGE UP (ref 8.4–10.5)
CHLORIDE SERPL-SCNC: 102 MMOL/L — SIGNIFICANT CHANGE UP (ref 96–108)
CO2 SERPL-SCNC: 20 MMOL/L — LOW (ref 22–31)
CREAT SERPL-MCNC: 0.7 MG/DL — SIGNIFICANT CHANGE UP (ref 0.5–1.3)
CULTURE RESULTS: SIGNIFICANT CHANGE UP
EGFR: 110 ML/MIN/1.73M2 — SIGNIFICANT CHANGE UP
EOSINOPHIL # BLD AUTO: 0.07 K/UL — SIGNIFICANT CHANGE UP (ref 0–0.5)
EOSINOPHIL NFR BLD AUTO: 1.1 % — SIGNIFICANT CHANGE UP (ref 0–6)
GLUCOSE BLDC GLUCOMTR-MCNC: 152 MG/DL — HIGH (ref 70–99)
GLUCOSE BLDC GLUCOMTR-MCNC: 168 MG/DL — HIGH (ref 70–99)
GLUCOSE BLDC GLUCOMTR-MCNC: 258 MG/DL — HIGH (ref 70–99)
GLUCOSE BLDC GLUCOMTR-MCNC: 260 MG/DL — HIGH (ref 70–99)
GLUCOSE BLDC GLUCOMTR-MCNC: 303 MG/DL — HIGH (ref 70–99)
GLUCOSE SERPL-MCNC: 365 MG/DL — HIGH (ref 70–99)
GRAM STN FLD: SIGNIFICANT CHANGE UP
GRAM STN FLD: SIGNIFICANT CHANGE UP
HCT VFR BLD CALC: 40.1 % — SIGNIFICANT CHANGE UP (ref 34.5–45)
HGB BLD-MCNC: 11.3 G/DL — LOW (ref 11.5–15.5)
IMM GRANULOCYTES NFR BLD AUTO: 0.6 % — SIGNIFICANT CHANGE UP (ref 0–0.9)
INR BLD: 0.95 — SIGNIFICANT CHANGE UP (ref 0.85–1.18)
LYMPHOCYTES # BLD AUTO: 1.85 K/UL — SIGNIFICANT CHANGE UP (ref 1–3.3)
LYMPHOCYTES # BLD AUTO: 29.5 % — SIGNIFICANT CHANGE UP (ref 13–44)
MAGNESIUM SERPL-MCNC: 1.5 MG/DL — LOW (ref 1.6–2.6)
MCHC RBC-ENTMCNC: 23.7 PG — LOW (ref 27–34)
MCHC RBC-ENTMCNC: 28.2 GM/DL — LOW (ref 32–36)
MCV RBC AUTO: 84.1 FL — SIGNIFICANT CHANGE UP (ref 80–100)
METHOD TYPE: SIGNIFICANT CHANGE UP
METHOD TYPE: SIGNIFICANT CHANGE UP
MONOCYTES # BLD AUTO: 0.38 K/UL — SIGNIFICANT CHANGE UP (ref 0–0.9)
MONOCYTES NFR BLD AUTO: 6.1 % — SIGNIFICANT CHANGE UP (ref 2–14)
NEUTROPHILS # BLD AUTO: 3.93 K/UL — SIGNIFICANT CHANGE UP (ref 1.8–7.4)
NEUTROPHILS NFR BLD AUTO: 62.5 % — SIGNIFICANT CHANGE UP (ref 43–77)
NRBC # BLD: 0 /100 WBCS — SIGNIFICANT CHANGE UP (ref 0–0)
ORGANISM # SPEC MICROSCOPIC CNT: SIGNIFICANT CHANGE UP
ORGANISM # SPEC MICROSCOPIC CNT: SIGNIFICANT CHANGE UP
PHOSPHATE SERPL-MCNC: 2.8 MG/DL — SIGNIFICANT CHANGE UP (ref 2.5–4.5)
PLATELET # BLD AUTO: 330 K/UL — SIGNIFICANT CHANGE UP (ref 150–400)
POTASSIUM SERPL-MCNC: 3.6 MMOL/L — SIGNIFICANT CHANGE UP (ref 3.5–5.3)
POTASSIUM SERPL-SCNC: 3.6 MMOL/L — SIGNIFICANT CHANGE UP (ref 3.5–5.3)
PROT SERPL-MCNC: 6.1 G/DL — SIGNIFICANT CHANGE UP (ref 6–8.3)
PROTHROM AB SERPL-ACNC: 10.9 SEC — SIGNIFICANT CHANGE UP (ref 9.5–13)
RBC # BLD: 4.77 M/UL — SIGNIFICANT CHANGE UP (ref 3.8–5.2)
RBC # FLD: 15.9 % — HIGH (ref 10.3–14.5)
SODIUM SERPL-SCNC: 133 MMOL/L — LOW (ref 135–145)
SPECIMEN SOURCE: SIGNIFICANT CHANGE UP
WBC # BLD: 6.28 K/UL — SIGNIFICANT CHANGE UP (ref 3.8–10.5)
WBC # FLD AUTO: 6.28 K/UL — SIGNIFICANT CHANGE UP (ref 3.8–10.5)

## 2023-07-30 PROCEDURE — 99233 SBSQ HOSP IP/OBS HIGH 50: CPT

## 2023-07-30 PROCEDURE — 99253 IP/OBS CNSLTJ NEW/EST LOW 45: CPT

## 2023-07-30 PROCEDURE — 93010 ELECTROCARDIOGRAM REPORT: CPT

## 2023-07-30 RX ORDER — APIXABAN 2.5 MG/1
5 TABLET, FILM COATED ORAL EVERY 12 HOURS
Refills: 0 | Status: DISCONTINUED | OUTPATIENT
Start: 2023-07-30 | End: 2023-07-31

## 2023-07-30 RX ORDER — HYDROMORPHONE HYDROCHLORIDE 2 MG/ML
0.5 INJECTION INTRAMUSCULAR; INTRAVENOUS; SUBCUTANEOUS ONCE
Refills: 0 | Status: DISCONTINUED | OUTPATIENT
Start: 2023-07-30 | End: 2023-07-30

## 2023-07-30 RX ORDER — MAGNESIUM SULFATE 500 MG/ML
2 VIAL (ML) INJECTION ONCE
Refills: 0 | Status: COMPLETED | OUTPATIENT
Start: 2023-07-30 | End: 2023-07-30

## 2023-07-30 RX ADMIN — GABAPENTIN 600 MILLIGRAM(S): 400 CAPSULE ORAL at 22:24

## 2023-07-30 RX ADMIN — OXYCODONE HYDROCHLORIDE 10 MILLIGRAM(S): 5 TABLET ORAL at 15:54

## 2023-07-30 RX ADMIN — ATORVASTATIN CALCIUM 40 MILLIGRAM(S): 80 TABLET, FILM COATED ORAL at 22:24

## 2023-07-30 RX ADMIN — Medication 8: at 09:19

## 2023-07-30 RX ADMIN — Medication 6: at 18:12

## 2023-07-30 RX ADMIN — Medication 3 MILLILITER(S): at 04:30

## 2023-07-30 RX ADMIN — Medication 25 UNIT(S): at 13:29

## 2023-07-30 RX ADMIN — OXYCODONE HYDROCHLORIDE 10 MILLIGRAM(S): 5 TABLET ORAL at 03:21

## 2023-07-30 RX ADMIN — GABAPENTIN 600 MILLIGRAM(S): 400 CAPSULE ORAL at 06:13

## 2023-07-30 RX ADMIN — INSULIN GLARGINE 50 UNIT(S): 100 INJECTION, SOLUTION SUBCUTANEOUS at 22:23

## 2023-07-30 RX ADMIN — GABAPENTIN 600 MILLIGRAM(S): 400 CAPSULE ORAL at 13:30

## 2023-07-30 RX ADMIN — OXYCODONE HYDROCHLORIDE 5 MILLIGRAM(S): 5 TABLET ORAL at 12:37

## 2023-07-30 RX ADMIN — Medication 6: at 22:26

## 2023-07-30 RX ADMIN — INSULIN GLARGINE 50 UNIT(S): 100 INJECTION, SOLUTION SUBCUTANEOUS at 09:20

## 2023-07-30 RX ADMIN — Medication 50 MILLIGRAM(S): at 12:07

## 2023-07-30 RX ADMIN — Medication 25 UNIT(S): at 18:14

## 2023-07-30 RX ADMIN — HYDROMORPHONE HYDROCHLORIDE 0.5 MILLIGRAM(S): 2 INJECTION INTRAMUSCULAR; INTRAVENOUS; SUBCUTANEOUS at 17:22

## 2023-07-30 RX ADMIN — HYDROMORPHONE HYDROCHLORIDE 0.5 MILLIGRAM(S): 2 INJECTION INTRAMUSCULAR; INTRAVENOUS; SUBCUTANEOUS at 17:52

## 2023-07-30 RX ADMIN — OXYCODONE HYDROCHLORIDE 5 MILLIGRAM(S): 5 TABLET ORAL at 20:24

## 2023-07-30 RX ADMIN — OXYCODONE HYDROCHLORIDE 10 MILLIGRAM(S): 5 TABLET ORAL at 02:21

## 2023-07-30 RX ADMIN — OXYCODONE HYDROCHLORIDE 10 MILLIGRAM(S): 5 TABLET ORAL at 16:24

## 2023-07-30 RX ADMIN — Medication 25 GRAM(S): at 22:23

## 2023-07-30 RX ADMIN — Medication 3 MILLILITER(S): at 22:24

## 2023-07-30 RX ADMIN — OXYCODONE HYDROCHLORIDE 5 MILLIGRAM(S): 5 TABLET ORAL at 19:54

## 2023-07-30 RX ADMIN — Medication 2: at 13:29

## 2023-07-30 RX ADMIN — OXYCODONE HYDROCHLORIDE 5 MILLIGRAM(S): 5 TABLET ORAL at 12:07

## 2023-07-30 RX ADMIN — ERTAPENEM SODIUM 120 MILLIGRAM(S): 1 INJECTION, POWDER, LYOPHILIZED, FOR SOLUTION INTRAMUSCULAR; INTRAVENOUS at 17:22

## 2023-07-30 RX ADMIN — Medication 25 UNIT(S): at 09:19

## 2023-07-30 RX ADMIN — APIXABAN 5 MILLIGRAM(S): 2.5 TABLET, FILM COATED ORAL at 23:58

## 2023-07-30 NOTE — CONSULT NOTE ADULT - ASSESSMENT
43F w/ contrast allergy PMH HTN, HLD, poorly controlled DM-II, CVA (11/2021, residual LE weakness L>R), PE 4/2023 (on Eliquis), Asthma, abd necrotizing fasciitis s/p suprapubic catheter and ostomy in place (11/2021, requiring intubation at that time), and frequent UTIs 2/2 suprapubic catheter w/ recent hospitalization for Urosepsis 5/23-6/1 (Klebsiella and EColi, was discharged w/ L Midline for IV abx), returned to [facility] 6/20 with chest pain underwent CCTA and subsequent [facility] 06/26/23 w/ NAT x 1 pRCA (85%) and NAT x 1 RPLS (80%); residual dLCx/OM1 70%, mLAD 50% with plan for staged intervention 5 weeks from index [facility] rogerio readmitted for management of cellulitis with concern for left groin abscess.     Review of Studies:    ECG 7/30/23: NSR @82 bpm (unchanged from prior admission)    TTE 6/21/23: Mild symmetric LVH. LVIDd 3.2cm. Hyperdynamic LVEF. Normal RV size and systolic function. Normal atria. No significant valvular disease. No pericardial effusion.     CCTA 6/23: Ca score 129 (accuracy of score is limited by image noise), probable severe pRCA stenosis, mod D2 stenosis, non obstructive in remaining coronaries.     [facility] 06/26/23 R Ulnar: Normal LM, 40-50% mLAD, D1 mild disease,  70% dLCX/OM1 stenosis, 85% pRCA and 80% RPLS. Intervention: IVUS guided NAT pRCA (85%) and NAT RPLS (80%); dLCx/OM1 70%, mLAD 50%. LVEDP 17.     #Chest pain/CAD  At bedside assessment, she reports non specific chest discomfort that occurs sometimes with exertion, sometimes positional when using her walker. hsTrop 25 after most recent episode with no ECG changes. Appears comfortable laying in bed with no shortness of breath or diaphoresis. She was planned for a staged PCI of residual LCx/OM1 disease 5 weeks after index [facility] - was discharged with Plavix and Eliquis with plan for ASA load in the event that she no longer needed the DOAC.    - Recommend continuing Plavix 75mg QD and Eliquis 5mg BID/ Heparin gtt (would not recommend interrupting anticoagulation/antiplatelet therapy for prolonged periods of time given recent NAT)  - Continue Lipitor 40mg QD  - Continue Toprol 50mg QD (hold HR <60, SBP <100)   - Discuss timing of staged intervention of the LCx/OM1 residual disease with interventional cardiology - (Dr Carver/Dr Fontaine)      Recommendations finalized pending attending attestation.

## 2023-07-30 NOTE — PROGRESS NOTE ADULT - PROBLEM SELECTOR PLAN 8
History of bilateral pulmonary emboli in segmental and subsegmental branches on the R and subsegmental branches on the L, diagnosed on CT chest during ED visit on 4/7/23. Home meds: Eliquis 5mg BID  No respiratory distress at this time   Plan:  - Held Eliquis for possible debridement   - On Heparin Full AC, and plans to transition back to eliquis following surgical debridement.

## 2023-07-30 NOTE — PROGRESS NOTE ADULT - PROBLEM SELECTOR PLAN 4
Per previous hospitalization, pt was on Lantus 50u in AM and 50u at bedtime. Also on Lispro 25u before meals  Glucose >300 on arrival   Plan:  -c/w above as well as mISS

## 2023-07-30 NOTE — PROGRESS NOTE ADULT - PROBLEM SELECTOR PLAN 3
Pt with Hx of abdominal necrotizing fascitis s/p ostomy placement   Pt wqith pain around the ostomy, on exam there is a hernia palpated at the ostomy site, tender to touch  CT Abdomen from 6/21 showing Fat-containing parastomal hernia, unchanged.  Surgery following   Plan:  - Surgery states no emergent intervention at this time as it is easily reducible.  OUtpatient follow up.   - Monitor ostomy output  - Daily exams

## 2023-07-30 NOTE — PROGRESS NOTE ADULT - NSPROGADDITIONALINFOA_GEN_ALL_CORE
50 minutes spent on this encounter, including face to face with patient, care coordination and documentation.  Plan of care discussed with resident team.

## 2023-07-30 NOTE — PROCEDURE NOTE - ADDITIONAL PROCEDURE DETAILS
Small elliptical incision made and cultures taken of blood/pus. Packed with surgicell. Team will replace dressing tomorrow. Ok to restart anticoagulation around 10pm.

## 2023-07-30 NOTE — CONSULT NOTE ADULT - SUBJECTIVE AND OBJECTIVE BOX
43F w/ contrast allergy PMH HTN, HLD, poorly controlled DM-II, CVA (2021, residual LE weakness L>R), PE 2023 (on Eliquis), Asthma, abd necrotizing fasciitis s/p suprapubic catheter and ostomy in place (2021, requiring intubation at that time), and frequent UTIs 2/2 suprapubic catheter w/ recent hospitalization for Urosepsis - (Klebsiella and EColi, was discharged w/ L Midline for IV abx), returned to [facility]  with chest pain underwent CCTA and subsequent [facility] 23 w/ NAT x 1 pRCA (85%) and NAT x 1 RPLS (80%); residual dLCx/OM1 70%, mLAD 50% with plan for staged intervention 5 weeks from index [facility] rogerio readmitted for management of cellulitis with concern for left groin abscess.     All: Amoxicillin, Vancomycin, Shllfish, iodine, clindamycin, Bactrim  Meds: Plavix 75mg, Eliquis 5mg BID, Lipitor 40mg, Gabapentin 600mg, Toprol 50mg, Oxycodone, Ventolin   PSH: exploratory laparotomy, LEEP, ventral hernia repair, , ostomy and suprapubic catheter,  D&C  FH: DM and HTN  SH: Denies smoking, illicit drug use and EtOH use      PREVIOUS DIAGNOSTIC TESTING:      ECHO  FINDINGS: TTE 23: Mild symmetric LVH. LVIDd 3.2cm. Hyperdynamic LVEF. Normal RV size and systolic function. Normal atria. No significant valvular disease. No pericardial effusion.     STRESS  FINDINGS: None    CATHETERIZATION  FINDINGS:     CCTA : Ca score 129 (accuracy of score is limited by image noise), probable severe pRCA stenosis, mod D2 stenosis, non obstructive in remaining coronaries.     [facility] 23 R Ulnar: Normal LM, 40-50% mLAD, D1 mild disease,  70% dLCX/OM1 stenosis, 85% pRCA and 80% RPLS. Intervention: IVUS guided NAT pRCA (85%) and NAT RPLS (80%); dLCx/OM1 70%, mLAD 50%.LVEDP 17.     MEDICATIONS  (STANDING):  albuterol/ipratropium for Nebulization 3 milliLiter(s) Nebulizer every 6 hours  atorvastatin 40 milliGRAM(s) Oral at bedtime  clopidogrel Tablet 75 milliGRAM(s) Oral daily  DAPTOmycin IVPB 600 milliGRAM(s) IV Intermittent every 24 hours  dextrose 5%. 1000 milliLiter(s) (100 mL/Hr) IV Continuous <Continuous>  dextrose 5%. 1000 milliLiter(s) (50 mL/Hr) IV Continuous <Continuous>  dextrose 50% Injectable 12.5 Gram(s) IV Push once  dextrose 50% Injectable 25 Gram(s) IV Push once  dextrose 50% Injectable 25 Gram(s) IV Push once  ertapenem  IVPB 1000 milliGRAM(s) IV Intermittent every 24 hours  gabapentin 600 milliGRAM(s) Oral every 8 hours  glucagon  Injectable 1 milliGRAM(s) IntraMuscular once  insulin glargine Injectable (LANTUS) 50 Unit(s) SubCutaneous at bedtime  insulin glargine Injectable (LANTUS) 50 Unit(s) SubCutaneous every morning  insulin lispro (ADMELOG) corrective regimen sliding scale   SubCutaneous Before meals and at bedtime  insulin lispro Injectable (ADMELOG) 25 Unit(s) SubCutaneous three times a day before meals  magnesium sulfate  IVPB 2 Gram(s) IV Intermittent once  metoprolol succinate ER 50 milliGRAM(s) Oral every 24 hours    MEDICATIONS  (PRN):  acetaminophen     Tablet .. 650 milliGRAM(s) Oral every 6 hours PRN Temp greater or equal to 38C (100.4F), Mild Pain (1 - 3)  dextrose Oral Gel 15 Gram(s) Oral once PRN Blood Glucose LESS THAN 70 milliGRAM(s)/deciliter  oxyCODONE    IR 5 milliGRAM(s) Oral every 6 hours PRN Severe Pain (7 - 10)  oxyCODONE    IR 10 milliGRAM(s) Oral every 12 hours PRN breakthrough pain      FAMILY HISTORY:  FH: diabetes mellitus  father and mother    FH: hypertension  father and mother        SOCIAL HISTORY:    CIGARETTES:    ALCOHOL:    REVIEW OF SYSTEMS      General:	    Skin/Breast:  	  Ophthalmologic:  	  ENMT:	    Respiratory and Thorax:  	  Cardiovascular:	    Gastrointestinal:	    Genitourinary:	    Musculoskeletal:	    Neurological:	    Psychiatric:	    Hematology/Lymphatics:	    Endocrine:	    Allergic/Immunologic:	    Vital Signs Last 24 Hrs  T(C): 36.6 (2023 12:05), Max: 37.5 (2023 05:11)  T(F): 97.9 (2023 12:05), Max: 99.5 (2023 05:11)  HR: 94 (2023 12:05) (86 - 94)  BP: 158/89 (2023 12:05) (157/82 - 158/99)  BP(mean): 111 (2023 12:05) (107 - 111)  RR: 20 (2023 12:05) (19 - 20)  SpO2: 99% (2023 12:05) (99% - 100%)    Parameters below as of 2023 12:05  Patient On (Oxygen Delivery Method): room air        PHYSICAL EXAM:      Constitutional:    Eyes:    ENMT:    Neck:    Breasts:    Back:    Respiratory:    Cardiovascular:    Gastrointestinal:    Genitourinary:    Rectal:    Extremities:    Vascular:    Neurological:    Skin:    Lymph Nodes:    Musculoskeletal:    Psychiatric:            INTERPRETATION OF TELEMETRY:    ECG:    I&O's Detail    2023 07:01  -  2023 07:00  --------------------------------------------------------  IN:    Heparin Infusion: 198 mL  Total IN: 198 mL    OUT:    Voided (mL): 700 mL  Total OUT: 700 mL    Total NET: -502 mL          LABS:                        11.3   6.28  )-----------( 330      ( 2023 09:15 )             40.1     07-30    133<L>  |  102  |  11  ----------------------------<  365<H>  3.6   |  20<L>  |  0.70    Ca    8.4      2023 09:15  Phos  2.8     07-30  Mg     1.5     07-30    TPro  6.1  /  Alb  2.6<L>  /  TBili  <0.2  /  DBili  x   /  AST  11  /  ALT  8<L>  /  AlkPhos  144<H>  07-30    CARDIAC MARKERS ( 2023 14:00 )  x     / x     / 47 U/L / x     / 2.5 ng/mL      PT/INR - ( 2023 01:21 )   PT: 10.9 sec;   INR: 0.95          PTT - ( 2023 09:15 )  PTT:31.0 sec  Urinalysis Basic - ( 2023 09:15 )    Color: x / Appearance: x / SG: x / pH: x  Gluc: 365 mg/dL / Ketone: x  / Bili: x / Urobili: x   Blood: x / Protein: x / Nitrite: x   Leuk Esterase: x / RBC: x / WBC x   Sq Epi: x / Non Sq Epi: x / Bacteria: x      I&O's Summary    2023 07:01  -  2023 07:00  --------------------------------------------------------  IN: 198 mL / OUT: 700 mL / NET: -502 mL      BNP  RADIOLOGY & ADDITIONAL STUDIES: 43F w/ contrast allergy PMH HTN, HLD, poorly controlled DM-II, CVA (2021, residual LE weakness L>R), PE 2023 (on Eliquis), Asthma, abd necrotizing fasciitis s/p suprapubic catheter and ostomy in place (2021, requiring intubation at that time), and frequent UTIs 2/2 suprapubic catheter w/ recent hospitalization for Urosepsis - (Klebsiella and EColi, was discharged w/ L Midline for IV abx), returned to [facility]  with chest pain underwent CCTA and subsequent [facility] 23 w/ NAT x 1 pRCA (85%) and NAT x 1 RPLS (80%); residual dLCx/OM1 70%, mLAD 50% with plan for staged intervention 5 weeks from index [facility] rogerio readmitted for management of cellulitis with concern for left groin abscess.     All: Amoxicillin, Vancomycin, Shellfish iodine, clindamycin, Bactrim  Meds: Plavix 75mg, Eliquis 5mg BID, Lipitor 40mg, Gabapentin 600mg, Toprol 50mg, Oxycodone, Ventolin   PSH: exploratory laparotomy, LEEP, ventral hernia repair, , ostomy and suprapubic catheter,  D&C  FH: DM and HTN  SH: Denies smoking, illicit drug use and EtOH use      PREVIOUS DIAGNOSTIC TESTING:      ECHO  FINDINGS: TTE 23: Mild symmetric LVH. LVIDd 3.2cm. Hyperdynamic LVEF. Normal RV size and systolic function. Normal atria. No significant valvular disease. No pericardial effusion.     STRESS  FINDINGS: None    CATHETERIZATION  FINDINGS:     CCTA : Ca score 129 (accuracy of score is limited by image noise), probable severe pRCA stenosis, mod D2 stenosis, non obstructive in remaining coronaries.     [facility] 23 R Ulnar: Normal LM, 40-50% mLAD, D1 mild disease,  70% dLCX/OM1 stenosis, 85% pRCA and 80% RPLS. Intervention: IVUS guided NAT pRCA (85%) and NAT RPLS (80%); dLCx/OM1 70%, mLAD 50%.LVEDP 17.     MEDICATIONS  (STANDING):  albuterol/ipratropium for Nebulization 3 milliLiter(s) Nebulizer every 6 hours  atorvastatin 40 milliGRAM(s) Oral at bedtime  clopidogrel Tablet 75 milliGRAM(s) Oral daily  DAPTOmycin IVPB 600 milliGRAM(s) IV Intermittent every 24 hours  dextrose 5%. 1000 milliLiter(s) (100 mL/Hr) IV Continuous <Continuous>  dextrose 5%. 1000 milliLiter(s) (50 mL/Hr) IV Continuous <Continuous>  dextrose 50% Injectable 12.5 Gram(s) IV Push once  dextrose 50% Injectable 25 Gram(s) IV Push once  dextrose 50% Injectable 25 Gram(s) IV Push once  ertapenem  IVPB 1000 milliGRAM(s) IV Intermittent every 24 hours  gabapentin 600 milliGRAM(s) Oral every 8 hours  glucagon  Injectable 1 milliGRAM(s) IntraMuscular once  insulin glargine Injectable (LANTUS) 50 Unit(s) SubCutaneous at bedtime  insulin glargine Injectable (LANTUS) 50 Unit(s) SubCutaneous every morning  insulin lispro (ADMELOG) corrective regimen sliding scale   SubCutaneous Before meals and at bedtime  insulin lispro Injectable (ADMELOG) 25 Unit(s) SubCutaneous three times a day before meals  magnesium sulfate  IVPB 2 Gram(s) IV Intermittent once  metoprolol succinate ER 50 milliGRAM(s) Oral every 24 hours    MEDICATIONS  (PRN):  acetaminophen     Tablet .. 650 milliGRAM(s) Oral every 6 hours PRN Temp greater or equal to 38C (100.4F), Mild Pain (1 - 3)  dextrose Oral Gel 15 Gram(s) Oral once PRN Blood Glucose LESS THAN 70 milliGRAM(s)/deciliter  oxyCODONE    IR 5 milliGRAM(s) Oral every 6 hours PRN Severe Pain (7 - 10)  oxyCODONE    IR 10 milliGRAM(s) Oral every 12 hours PRN breakthrough pain      T(C): 36.6 (2023 12:05), Max: 37.5 (2023 05:11)  T(F): 97.9 (2023 12:05), Max: 99.5 (2023 05:11)  HR: 94 (2023 12:05) (86 - 94)  BP: 158/89 (2023 12:05) (157/82 - 158/99)  BP(mean): 111 (2023 12:05) (107 - 111)  RR: 20 (2023 12:05) (19 - 20)  SpO2: 99% (2023 12:05) (99% - 100%)    Parameters below as of 2023 12:05  Patient On (Oxygen Delivery Method): room air    PHYSICAL EXAM:  GENERAL: Young  obese female laying in bed NAD, speaks in full sentences, no signs of respiratory distress  HEAD:  Atraumatic, Normocephalic  EYES: EOMI, PERRLA, conjunctiva and sclera clear  NECK: Supple, No JVD  CHEST/LUNG: Clear to auscultation bilaterally; No wheeze; No crackles; No accessory muscles used  HEART: Regular rate and rhythm; No murmurs;   ABDOMEN: Soft, nondistended, colostomy bag in place with stool, : L sided groin with area of erythema and swelling  EXTREMITIES:  2+ Peripheral Pulses, No cyanosis or edema  PSYCH: AAOx3  NEUROLOGY: non-focal  SKIN: No rashes or lesions    INTERPRETATION OF TELEMETRY: None    ECG:    I&O's Detail    2023 07:01  -  2023 07:00  --------------------------------------------------------  IN:    Heparin Infusion: 198 mL  Total IN: 198 mL    OUT:    Voided (mL): 700 mL  Total OUT: 700 mL    Total NET: -502 mL          LABS:                        11.3   6.28  )-----------( 330      ( 2023 09:15 )             40.1     07-30    133<L>  |  102  |  11  ----------------------------<  365<H>  3.6   |  20<L>  |  0.70    Ca    8.4      2023 09:15  Phos  2.8     07-30  Mg     1.5     -30    TPro  6.1  /  Alb  2.6<L>  /  TBili  <0.2  /  DBili  x   /  AST  11  /  ALT  8<L>  /  AlkPhos  144<H>  07-30    CARDIAC MARKERS ( 2023 14:00 )  x     / x     / 47 U/L / x     / 2.5 ng/mL      PT/INR - ( 2023 01:21 )   PT: 10.9 sec;   INR: 0.95          PTT - ( 2023 09:15 )  PTT:31.0 sec  Urinalysis Basic - ( 2023 09:15 )    Color: x / Appearance: x / SG: x / pH: x  Gluc: 365 mg/dL / Ketone: x  / Bili: x / Urobili: x   Blood: x / Protein: x / Nitrite: x   Leuk Esterase: x / RBC: x / WBC x   Sq Epi: x / Non Sq Epi: x / Bacteria: x      I&O's Summary    2023 07:01  -  2023 07:00  --------------------------------------------------------  IN: 198 mL / OUT: 700 mL / NET: -502 mL      BNP  RADIOLOGY & ADDITIONAL STUDIES:

## 2023-07-30 NOTE — PROGRESS NOTE ADULT - SUBJECTIVE AND OBJECTIVE BOX
Feeling a little better today, but didn't get much sleep.  Very tired.  Her chest pain is improved, but still intermittently there.  She states that she still had some chest discomfort after getting the stents last month.  She says that she is supposed to get more stents placed, but not sure when that will happen.       Remaining ROS negative       PHYSICAL EXAM:    General: fatigued, resting in bed  HEENT: NC/AT; MMM  Cardiovascular: +S1/S2, RRR, no mrg  Respiratory: CTA B/L; no W/R/R  Gastrointestinal: soft,; +BSx4, ostomy in place  Extremities: WWP; no edema  Neurological: speech fluent, follows commands, no acute deficits noted  Psychiatric: pleasant mood and affect      VITAL SIGNS:  Vital Signs Last 24 Hrs  T(C): 36.6 (30 Jul 2023 12:05), Max: 37.5 (30 Jul 2023 05:11)  T(F): 97.9 (30 Jul 2023 12:05), Max: 99.5 (30 Jul 2023 05:11)  HR: 94 (30 Jul 2023 12:05) (86 - 94)  BP: 158/89 (30 Jul 2023 12:05) (157/82 - 158/99)  BP(mean): 111 (30 Jul 2023 12:05) (107 - 111)  RR: 20 (30 Jul 2023 12:05) (19 - 20)  SpO2: 99% (30 Jul 2023 12:05) (99% - 100%)    Parameters below as of 30 Jul 2023 12:05  Patient On (Oxygen Delivery Method): room air          MEDICATIONS:  MEDICATIONS  (STANDING):  albuterol/ipratropium for Nebulization 3 milliLiter(s) Nebulizer every 6 hours  atorvastatin 40 milliGRAM(s) Oral at bedtime  clopidogrel Tablet 75 milliGRAM(s) Oral daily  DAPTOmycin IVPB 600 milliGRAM(s) IV Intermittent every 24 hours  dextrose 5%. 1000 milliLiter(s) (100 mL/Hr) IV Continuous <Continuous>  dextrose 5%. 1000 milliLiter(s) (50 mL/Hr) IV Continuous <Continuous>  dextrose 50% Injectable 12.5 Gram(s) IV Push once  dextrose 50% Injectable 25 Gram(s) IV Push once  dextrose 50% Injectable 25 Gram(s) IV Push once  ertapenem  IVPB 1000 milliGRAM(s) IV Intermittent every 24 hours  gabapentin 600 milliGRAM(s) Oral every 8 hours  glucagon  Injectable 1 milliGRAM(s) IntraMuscular once  insulin glargine Injectable (LANTUS) 50 Unit(s) SubCutaneous at bedtime  insulin glargine Injectable (LANTUS) 50 Unit(s) SubCutaneous every morning  insulin lispro (ADMELOG) corrective regimen sliding scale   SubCutaneous Before meals and at bedtime  insulin lispro Injectable (ADMELOG) 25 Unit(s) SubCutaneous three times a day before meals  magnesium sulfate  IVPB 2 Gram(s) IV Intermittent once  metoprolol succinate ER 50 milliGRAM(s) Oral every 24 hours    MEDICATIONS  (PRN):  acetaminophen     Tablet .. 650 milliGRAM(s) Oral every 6 hours PRN Temp greater or equal to 38C (100.4F), Mild Pain (1 - 3)  dextrose Oral Gel 15 Gram(s) Oral once PRN Blood Glucose LESS THAN 70 milliGRAM(s)/deciliter  oxyCODONE    IR 10 milliGRAM(s) Oral every 12 hours PRN breakthrough pain  oxyCODONE    IR 5 milliGRAM(s) Oral every 6 hours PRN Severe Pain (7 - 10)      ALLERGIES:  Allergies    vancomycin (Anaphylaxis; Hives)  shellfish. (Hives; Anaphylaxis)  iodine containing compounds (Hives; Anaphylaxis)  clindamycin (Pruritus)  amoxicillin (Short breath; Rash)  penicillin (Hives)  fish (Hives; Urticaria)    Intolerances    Bactrim I.V. (Rash (Mod to Severe))      LABS:                        11.3   6.28  )-----------( 330      ( 30 Jul 2023 09:15 )             40.1     07-30    133<L>  |  102  |  11  ----------------------------<  365<H>  3.6   |  20<L>  |  0.70    Ca    8.4      30 Jul 2023 09:15  Phos  2.8     07-30  Mg     1.5     07-30    TPro  6.1  /  Alb  2.6<L>  /  TBili  <0.2  /  DBili  x   /  AST  11  /  ALT  8<L>  /  AlkPhos  144<H>  07-30    PT/INR - ( 30 Jul 2023 01:21 )   PT: 10.9 sec;   INR: 0.95          PTT - ( 30 Jul 2023 09:15 )  PTT:31.0 sec  Urinalysis Basic - ( 30 Jul 2023 09:15 )    Color: x / Appearance: x / SG: x / pH: x  Gluc: 365 mg/dL / Ketone: x  / Bili: x / Urobili: x   Blood: x / Protein: x / Nitrite: x   Leuk Esterase: x / RBC: x / WBC x   Sq Epi: x / Non Sq Epi: x / Bacteria: x      CAPILLARY BLOOD GLUCOSE      POCT Blood Glucose.: 152 mg/dL (30 Jul 2023 13:08)      RADIOLOGY & ADDITIONAL TESTS: Reviewed.

## 2023-07-30 NOTE — PROGRESS NOTE ADULT - ASSESSMENT
43y F w/ PMHx Asthma, CVA (11/2021; residual L>R weakness), PE (4/2023 on Eliquis), T2DM, HTN, HLD, hx abdominal necrotizing fasciitis s/p diverting colostomy creation (11/2021), with intubation from 11-12/2021, hx of frequent UTIs 2/2 to suprapubic catheter, w/ most recently UTI c/b Urosepsis and d/c'd w/ midline for Ertapenem, recent cardiac stents now presenting due to generally not feeling well and concern for recurrent UTI, admitted for management of cellulitis. General surgery consulted for evaluation of parastomal hernia just superior to colostomy. Patient without any symptoms of obstruction, w/out any abdominal pain currently, w/out any overlying skin changes or erythema. Left groin with abscess that was actively draining while at bedside.     Plan:  #Parastomal Hernia  - No acute surgical intervention at this time for parastomal hernia.     #L. Groin Abscess  - Abscess drained this afternoon. Can restart eliquis at 10pm.  - Team 4c continue to follow, please call with any questions or concerns

## 2023-07-30 NOTE — PROGRESS NOTE ADULT - SUBJECTIVE AND OBJECTIVE BOX
SUBJECTIVE: Pt seen and evaluated at bedside. Pt continues to have pain on the L leg where the abscess is located.    Vital Signs Last 24 Hrs  T(C): 36.6 (30 Jul 2023 12:05), Max: 37.5 (30 Jul 2023 05:11)  T(F): 97.9 (30 Jul 2023 12:05), Max: 99.5 (30 Jul 2023 05:11)  HR: 94 (30 Jul 2023 12:05) (86 - 94)  BP: 158/89 (30 Jul 2023 12:05) (157/82 - 158/99)  BP(mean): 111 (30 Jul 2023 12:05) (107 - 111)  RR: 20 (30 Jul 2023 12:05) (19 - 20)  SpO2: 99% (30 Jul 2023 12:05) (99% - 100%)    Parameters below as of 30 Jul 2023 12:05  Patient On (Oxygen Delivery Method): room air        I&O's Summary    29 Jul 2023 07:01  -  30 Jul 2023 07:00  --------------------------------------------------------  IN: 198 mL / OUT: 700 mL / NET: -502 mL        Physical Exam:  General Appearance: Appears well, NAD  Pulmonary: Nonlabored breathing, no respiratory distress  Cardiovascular: NSR  Abdomen: Soft, nondistended, colostomy bag in place with stool  Groin: Lt sided groin with area of erythema and swelling  Extremities: SCD's in place     LABS:                        11.3   6.28  )-----------( 330      ( 30 Jul 2023 09:15 )             40.1     07-30    133<L>  |  102  |  11  ----------------------------<  365<H>  3.6   |  20<L>  |  0.70    Ca    8.4      30 Jul 2023 09:15  Phos  2.8     07-30  Mg     1.5     07-30    TPro  6.1  /  Alb  2.6<L>  /  TBili  <0.2  /  DBili  x   /  AST  11  /  ALT  8<L>  /  AlkPhos  144<H>  07-30    PT/INR - ( 30 Jul 2023 01:21 )   PT: 10.9 sec;   INR: 0.95          PTT - ( 30 Jul 2023 09:15 )  PTT:31.0 sec  Urinalysis Basic - ( 30 Jul 2023 09:15 )    Color: x / Appearance: x / SG: x / pH: x  Gluc: 365 mg/dL / Ketone: x  / Bili: x / Urobili: x   Blood: x / Protein: x / Nitrite: x   Leuk Esterase: x / RBC: x / WBC x   Sq Epi: x / Non Sq Epi: x / Bacteria: x

## 2023-07-30 NOTE — PROGRESS NOTE ADULT - PROBLEM SELECTOR PLAN 2
pt reports ongoing hx of dysuria, which worsened the past few days. Pt has a history of recurrent UTIs, which was attributed to suprapubic catheter. Catheter was removed during previous hospitalization in June  Plan:  - Initial UA negative of WBCs, leuk esterase and nitrites

## 2023-07-30 NOTE — PROGRESS NOTE ADULT - PROBLEM SELECTOR PLAN 7
Pt was admitted to cardiac service during last admission. Pt underwent CCTA on 06/23/2023 revealing Ca score 129 (accuracy of score is limited by image noise), probable severe pRCA stenosis, mod D2 stenosis, non obstructive in remaining coronaries. Pt is s/p cardiac angiogram on 6/26 and is s/p IVUS guided NAT pRCA (85%), NAT RPLS (80%); residual dLCx/OM1 70%, mLAD 50%. Pt was instructed to return for staged PCI of LCx and OM1 only if symptomatic  Plan:   -c/w Atorvastatin 40, Plavix 75mg PO qD, toprol 50mg PO qD  - alert cardiology that she is admitted.  She is having intermittent pain, and this is possibly related to her cardiac history.  Troponin checked yesterday, and was improved from admission.

## 2023-07-30 NOTE — PROGRESS NOTE ADULT - PROBLEM SELECTOR PLAN 1
Purulent   Pt reports a few days ago, she noticed a pimple to her left labia, which now has progressed to swelling, erythema, tenderness extending from the L labia to inner thigh. States she initially noted purulent and bloody drainage.   On physical exam, bloody and purulent drainage noted from site, very tender to palpation and erythematous   s/p Ertapenem 1g in the ED, will plan continued treatment with Cefazolin 1g q8h  Zosyn started on floors, pt tolerating   discharge Cx: gram positive cocci in clusters   BCx: NGTD  Pt  had total of 15mg of oxycodone overnight  Zosyn from 7/28-7/29   Pt with nausea, poor PO intake, gave Zofran in the AM and Tigan in the PM.   Plan  - Started on Daptomycin 600mg Q24H and Ertapenem 1g q24h per ID recs - consulted due to significant allergies  - c/w Oxycodone 10mg for breakthrough pain and Oxycodone 5mg for severe pain  - Tylenol q6h PRN Q6H for fever   - Surgery consulted, will need I&D of the abscess, switched AC to heparin for procedure at the bedside today.  Will confirm with surgery about when to resume eliquis

## 2023-07-31 DIAGNOSIS — R82.71 BACTERIURIA: ICD-10-CM

## 2023-07-31 LAB
-  AMPICILLIN: SIGNIFICANT CHANGE UP
-  CIPROFLOXACIN: SIGNIFICANT CHANGE UP
-  NITROFURANTOIN: SIGNIFICANT CHANGE UP
-  TETRACYCLINE: SIGNIFICANT CHANGE UP
-  VANCOMYCIN: SIGNIFICANT CHANGE UP
ALBUMIN SERPL ELPH-MCNC: 3.1 G/DL — LOW (ref 3.3–5)
ALP SERPL-CCNC: 149 U/L — HIGH (ref 40–120)
ALT FLD-CCNC: 10 U/L — SIGNIFICANT CHANGE UP (ref 10–45)
ANION GAP SERPL CALC-SCNC: 13 MMOL/L — SIGNIFICANT CHANGE UP (ref 5–17)
AST SERPL-CCNC: 13 U/L — SIGNIFICANT CHANGE UP (ref 10–40)
BASOPHILS # BLD AUTO: 0.02 K/UL — SIGNIFICANT CHANGE UP (ref 0–0.2)
BASOPHILS NFR BLD AUTO: 0.3 % — SIGNIFICANT CHANGE UP (ref 0–2)
BILIRUB SERPL-MCNC: <0.2 MG/DL — SIGNIFICANT CHANGE UP (ref 0.2–1.2)
BUN SERPL-MCNC: 10 MG/DL — SIGNIFICANT CHANGE UP (ref 7–23)
CALCIUM SERPL-MCNC: 8.2 MG/DL — LOW (ref 8.4–10.5)
CHLORIDE SERPL-SCNC: 99 MMOL/L — SIGNIFICANT CHANGE UP (ref 96–108)
CO2 SERPL-SCNC: 26 MMOL/L — SIGNIFICANT CHANGE UP (ref 22–31)
CREAT SERPL-MCNC: 0.74 MG/DL — SIGNIFICANT CHANGE UP (ref 0.5–1.3)
CULTURE RESULTS: SIGNIFICANT CHANGE UP
EGFR: 103 ML/MIN/1.73M2 — SIGNIFICANT CHANGE UP
EOSINOPHIL # BLD AUTO: 0.09 K/UL — SIGNIFICANT CHANGE UP (ref 0–0.5)
EOSINOPHIL NFR BLD AUTO: 1.2 % — SIGNIFICANT CHANGE UP (ref 0–6)
GLUCOSE BLDC GLUCOMTR-MCNC: 221 MG/DL — HIGH (ref 70–99)
GLUCOSE BLDC GLUCOMTR-MCNC: 226 MG/DL — HIGH (ref 70–99)
GLUCOSE BLDC GLUCOMTR-MCNC: 238 MG/DL — HIGH (ref 70–99)
GLUCOSE BLDC GLUCOMTR-MCNC: 362 MG/DL — HIGH (ref 70–99)
GLUCOSE SERPL-MCNC: 230 MG/DL — HIGH (ref 70–99)
HCT VFR BLD CALC: 37.6 % — SIGNIFICANT CHANGE UP (ref 34.5–45)
HGB BLD-MCNC: 11.3 G/DL — LOW (ref 11.5–15.5)
IMM GRANULOCYTES NFR BLD AUTO: 0.8 % — SIGNIFICANT CHANGE UP (ref 0–0.9)
LYMPHOCYTES # BLD AUTO: 2.29 K/UL — SIGNIFICANT CHANGE UP (ref 1–3.3)
LYMPHOCYTES # BLD AUTO: 30 % — SIGNIFICANT CHANGE UP (ref 13–44)
MAGNESIUM SERPL-MCNC: 1.8 MG/DL — SIGNIFICANT CHANGE UP (ref 1.6–2.6)
MCHC RBC-ENTMCNC: 24 PG — LOW (ref 27–34)
MCHC RBC-ENTMCNC: 30.1 GM/DL — LOW (ref 32–36)
MCV RBC AUTO: 80 FL — SIGNIFICANT CHANGE UP (ref 80–100)
METHOD TYPE: SIGNIFICANT CHANGE UP
MONOCYTES # BLD AUTO: 0.54 K/UL — SIGNIFICANT CHANGE UP (ref 0–0.9)
MONOCYTES NFR BLD AUTO: 7.1 % — SIGNIFICANT CHANGE UP (ref 2–14)
NEUTROPHILS # BLD AUTO: 4.63 K/UL — SIGNIFICANT CHANGE UP (ref 1.8–7.4)
NEUTROPHILS NFR BLD AUTO: 60.6 % — SIGNIFICANT CHANGE UP (ref 43–77)
NRBC # BLD: 0 /100 WBCS — SIGNIFICANT CHANGE UP (ref 0–0)
ORGANISM # SPEC MICROSCOPIC CNT: SIGNIFICANT CHANGE UP
PHOSPHATE SERPL-MCNC: 4.1 MG/DL — SIGNIFICANT CHANGE UP (ref 2.5–4.5)
PLATELET # BLD AUTO: 393 K/UL — SIGNIFICANT CHANGE UP (ref 150–400)
POTASSIUM SERPL-MCNC: 3.4 MMOL/L — LOW (ref 3.5–5.3)
POTASSIUM SERPL-SCNC: 3.4 MMOL/L — LOW (ref 3.5–5.3)
PROT SERPL-MCNC: 6.5 G/DL — SIGNIFICANT CHANGE UP (ref 6–8.3)
RBC # BLD: 4.7 M/UL — SIGNIFICANT CHANGE UP (ref 3.8–5.2)
RBC # FLD: 15.7 % — HIGH (ref 10.3–14.5)
SODIUM SERPL-SCNC: 138 MMOL/L — SIGNIFICANT CHANGE UP (ref 135–145)
SPECIMEN SOURCE: SIGNIFICANT CHANGE UP
WBC # BLD: 7.63 K/UL — SIGNIFICANT CHANGE UP (ref 3.8–10.5)
WBC # FLD AUTO: 7.63 K/UL — SIGNIFICANT CHANGE UP (ref 3.8–10.5)

## 2023-07-31 PROCEDURE — 99233 SBSQ HOSP IP/OBS HIGH 50: CPT | Mod: GC

## 2023-07-31 PROCEDURE — 99232 SBSQ HOSP IP/OBS MODERATE 35: CPT

## 2023-07-31 RX ORDER — ASPIRIN/CALCIUM CARB/MAGNESIUM 324 MG
325 TABLET ORAL ONCE
Refills: 0 | Status: COMPLETED | OUTPATIENT
Start: 2023-07-31 | End: 2023-07-31

## 2023-07-31 RX ORDER — POTASSIUM CHLORIDE 20 MEQ
40 PACKET (EA) ORAL ONCE
Refills: 0 | Status: COMPLETED | OUTPATIENT
Start: 2023-07-31 | End: 2023-07-31

## 2023-07-31 RX ORDER — CEFAZOLIN SODIUM 1 G
2000 VIAL (EA) INJECTION EVERY 8 HOURS
Refills: 0 | Status: DISCONTINUED | OUTPATIENT
Start: 2023-07-31 | End: 2023-07-31

## 2023-07-31 RX ORDER — NITROFURANTOIN MACROCRYSTAL 50 MG
100 CAPSULE ORAL EVERY 12 HOURS
Refills: 0 | Status: DISCONTINUED | OUTPATIENT
Start: 2023-07-31 | End: 2023-07-31

## 2023-07-31 RX ORDER — ATORVASTATIN CALCIUM 80 MG/1
80 TABLET, FILM COATED ORAL AT BEDTIME
Refills: 0 | Status: DISCONTINUED | OUTPATIENT
Start: 2023-07-31 | End: 2023-08-03

## 2023-07-31 RX ORDER — HYDROMORPHONE HYDROCHLORIDE 2 MG/ML
0.2 INJECTION INTRAMUSCULAR; INTRAVENOUS; SUBCUTANEOUS ONCE
Refills: 0 | Status: DISCONTINUED | OUTPATIENT
Start: 2023-07-31 | End: 2023-07-31

## 2023-07-31 RX ORDER — PANTOPRAZOLE SODIUM 20 MG/1
40 TABLET, DELAYED RELEASE ORAL
Refills: 0 | Status: DISCONTINUED | OUTPATIENT
Start: 2023-07-31 | End: 2023-08-03

## 2023-07-31 RX ORDER — LINEZOLID 600 MG/300ML
600 INJECTION, SOLUTION INTRAVENOUS EVERY 12 HOURS
Refills: 0 | Status: DISCONTINUED | OUTPATIENT
Start: 2023-07-31 | End: 2023-08-03

## 2023-07-31 RX ADMIN — OXYCODONE HYDROCHLORIDE 10 MILLIGRAM(S): 5 TABLET ORAL at 23:14

## 2023-07-31 RX ADMIN — Medication 4: at 08:42

## 2023-07-31 RX ADMIN — Medication 4: at 13:19

## 2023-07-31 RX ADMIN — Medication 3 MILLILITER(S): at 22:33

## 2023-07-31 RX ADMIN — OXYCODONE HYDROCHLORIDE 10 MILLIGRAM(S): 5 TABLET ORAL at 09:30

## 2023-07-31 RX ADMIN — INSULIN GLARGINE 50 UNIT(S): 100 INJECTION, SOLUTION SUBCUTANEOUS at 08:43

## 2023-07-31 RX ADMIN — Medication 25 UNIT(S): at 13:19

## 2023-07-31 RX ADMIN — Medication 3 MILLILITER(S): at 03:00

## 2023-07-31 RX ADMIN — OXYCODONE HYDROCHLORIDE 5 MILLIGRAM(S): 5 TABLET ORAL at 04:00

## 2023-07-31 RX ADMIN — INSULIN GLARGINE 50 UNIT(S): 100 INJECTION, SOLUTION SUBCUTANEOUS at 22:32

## 2023-07-31 RX ADMIN — HYDROMORPHONE HYDROCHLORIDE 0.2 MILLIGRAM(S): 2 INJECTION INTRAMUSCULAR; INTRAVENOUS; SUBCUTANEOUS at 15:08

## 2023-07-31 RX ADMIN — CLOPIDOGREL BISULFATE 75 MILLIGRAM(S): 75 TABLET, FILM COATED ORAL at 12:13

## 2023-07-31 RX ADMIN — Medication 100 MILLIGRAM(S): at 10:16

## 2023-07-31 RX ADMIN — Medication 50 MILLIGRAM(S): at 12:12

## 2023-07-31 RX ADMIN — LINEZOLID 600 MILLIGRAM(S): 600 INJECTION, SOLUTION INTRAVENOUS at 14:56

## 2023-07-31 RX ADMIN — GABAPENTIN 600 MILLIGRAM(S): 400 CAPSULE ORAL at 05:57

## 2023-07-31 RX ADMIN — GABAPENTIN 600 MILLIGRAM(S): 400 CAPSULE ORAL at 22:33

## 2023-07-31 RX ADMIN — Medication 3 MILLILITER(S): at 15:08

## 2023-07-31 RX ADMIN — Medication 100 MILLIGRAM(S): at 10:17

## 2023-07-31 RX ADMIN — HYDROMORPHONE HYDROCHLORIDE 0.2 MILLIGRAM(S): 2 INJECTION INTRAMUSCULAR; INTRAVENOUS; SUBCUTANEOUS at 15:44

## 2023-07-31 RX ADMIN — Medication 25 UNIT(S): at 08:42

## 2023-07-31 RX ADMIN — OXYCODONE HYDROCHLORIDE 5 MILLIGRAM(S): 5 TABLET ORAL at 15:06

## 2023-07-31 RX ADMIN — APIXABAN 5 MILLIGRAM(S): 2.5 TABLET, FILM COATED ORAL at 12:13

## 2023-07-31 RX ADMIN — PANTOPRAZOLE SODIUM 40 MILLIGRAM(S): 20 TABLET, DELAYED RELEASE ORAL at 14:56

## 2023-07-31 RX ADMIN — OXYCODONE HYDROCHLORIDE 5 MILLIGRAM(S): 5 TABLET ORAL at 14:08

## 2023-07-31 RX ADMIN — OXYCODONE HYDROCHLORIDE 10 MILLIGRAM(S): 5 TABLET ORAL at 08:57

## 2023-07-31 RX ADMIN — Medication 40 MILLIEQUIVALENT(S): at 09:13

## 2023-07-31 RX ADMIN — ATORVASTATIN CALCIUM 80 MILLIGRAM(S): 80 TABLET, FILM COATED ORAL at 22:33

## 2023-07-31 RX ADMIN — GABAPENTIN 600 MILLIGRAM(S): 400 CAPSULE ORAL at 12:12

## 2023-07-31 RX ADMIN — Medication 4: at 22:32

## 2023-07-31 RX ADMIN — Medication 325 MILLIGRAM(S): at 13:35

## 2023-07-31 RX ADMIN — Medication 3 MILLILITER(S): at 10:17

## 2023-07-31 RX ADMIN — Medication 25 UNIT(S): at 18:05

## 2023-07-31 RX ADMIN — OXYCODONE HYDROCHLORIDE 5 MILLIGRAM(S): 5 TABLET ORAL at 03:00

## 2023-07-31 RX ADMIN — Medication 10: at 18:05

## 2023-07-31 NOTE — PROGRESS NOTE ADULT - SUBJECTIVE AND OBJECTIVE BOX
** INCOMPLETE **    OVERNIGHT EVENTS:     SUBJECTIVE:    VITAL SIGNS:  Vital Signs Last 24 Hrs  T(C): 36.9 (31 Jul 2023 05:11), Max: 36.9 (30 Jul 2023 21:03)  T(F): 98.5 (31 Jul 2023 05:11), Max: 98.5 (30 Jul 2023 21:03)  HR: 93 (31 Jul 2023 05:11) (88 - 94)  BP: 119/76 (31 Jul 2023 05:11) (119/76 - 158/89)  BP(mean): 111 (30 Jul 2023 12:05) (111 - 111)  RR: 18 (31 Jul 2023 05:11) (18 - 20)  SpO2: 99% (31 Jul 2023 05:11) (97% - 99%)    Parameters below as of 31 Jul 2023 05:11  Patient On (Oxygen Delivery Method): room air        PHYSICAL EXAM:  General: NAD; speaking in full sentences  HEENT: NC/AT; PERRL; EOMI; MMM  Neck: supple; no JVD  Cardiac: RRR; +S1/S2  Pulm: CTA B/L; no W/R/R  GI: soft, NT/ND, +BS  Extremities: WWP; no edema, clubbing or cyanosis  Vasc: 2+ radial, DP pulses B/L  Neuro: AAOx3; no focal deficits    MEDICATIONS:  MEDICATIONS  (STANDING):  albuterol/ipratropium for Nebulization 3 milliLiter(s) Nebulizer every 6 hours  apixaban 5 milliGRAM(s) Oral every 12 hours  atorvastatin 40 milliGRAM(s) Oral at bedtime  clopidogrel Tablet 75 milliGRAM(s) Oral daily  DAPTOmycin IVPB 600 milliGRAM(s) IV Intermittent every 24 hours  dextrose 5%. 1000 milliLiter(s) (50 mL/Hr) IV Continuous <Continuous>  dextrose 5%. 1000 milliLiter(s) (100 mL/Hr) IV Continuous <Continuous>  dextrose 50% Injectable 12.5 Gram(s) IV Push once  dextrose 50% Injectable 25 Gram(s) IV Push once  dextrose 50% Injectable 25 Gram(s) IV Push once  ertapenem  IVPB 1000 milliGRAM(s) IV Intermittent every 24 hours  gabapentin 600 milliGRAM(s) Oral every 8 hours  glucagon  Injectable 1 milliGRAM(s) IntraMuscular once  insulin glargine Injectable (LANTUS) 50 Unit(s) SubCutaneous at bedtime  insulin glargine Injectable (LANTUS) 50 Unit(s) SubCutaneous every morning  insulin lispro (ADMELOG) corrective regimen sliding scale   SubCutaneous Before meals and at bedtime  insulin lispro Injectable (ADMELOG) 25 Unit(s) SubCutaneous three times a day before meals  metoprolol succinate ER 50 milliGRAM(s) Oral every 24 hours    MEDICATIONS  (PRN):  acetaminophen     Tablet .. 650 milliGRAM(s) Oral every 6 hours PRN Temp greater or equal to 38C (100.4F), Mild Pain (1 - 3)  dextrose Oral Gel 15 Gram(s) Oral once PRN Blood Glucose LESS THAN 70 milliGRAM(s)/deciliter  oxyCODONE    IR 5 milliGRAM(s) Oral every 6 hours PRN Severe Pain (7 - 10)  oxyCODONE    IR 10 milliGRAM(s) Oral every 12 hours PRN breakthrough pain      ALLERGIES:  Allergies    vancomycin (Anaphylaxis; Hives)  shellfish. (Hives; Anaphylaxis)  iodine containing compounds (Hives; Anaphylaxis)  clindamycin (Pruritus)  amoxicillin (Short breath; Rash)  penicillin (Hives)  fish (Hives; Urticaria)    Intolerances    Bactrim I.V. (Rash (Mod to Severe))      LABS:                        11.3   6.28  )-----------( 330      ( 30 Jul 2023 09:15 )             40.1     07-30    133<L>  |  102  |  11  ----------------------------<  365<H>  3.6   |  20<L>  |  0.70    Ca    8.4      30 Jul 2023 09:15  Phos  2.8     07-30  Mg     1.5     07-30    TPro  6.1  /  Alb  2.6<L>  /  TBili  <0.2  /  DBili  x   /  AST  11  /  ALT  8<L>  /  AlkPhos  144<H>  07-30    PT/INR - ( 30 Jul 2023 01:21 )   PT: 10.9 sec;   INR: 0.95          PTT - ( 30 Jul 2023 09:15 )  PTT:31.0 sec    RADIOLOGY & ADDITIONAL TESTS: Reviewed. OVERNIGHT EVENTS: EDIL    SUBJECTIVE:  The pt was seen and examined at the bedside. The pt states that she was in pain after the surgery but the medications alleviated this pain. She states that she continues to have abdominal pain, around the ostomy. The pt also states that her nausea is improved but not completely gone which is why she does not want to eat anything oral at this time. The pt also states that she has been having itchiness and grey vaginal discharge.     VITAL SIGNS:  Vital Signs Last 24 Hrs  T(C): 36.9 (31 Jul 2023 05:11), Max: 36.9 (30 Jul 2023 21:03)  T(F): 98.5 (31 Jul 2023 05:11), Max: 98.5 (30 Jul 2023 21:03)  HR: 93 (31 Jul 2023 05:11) (88 - 94)  BP: 119/76 (31 Jul 2023 05:11) (119/76 - 158/89)  BP(mean): 111 (30 Jul 2023 12:05) (111 - 111)  RR: 18 (31 Jul 2023 05:11) (18 - 20)  SpO2: 99% (31 Jul 2023 05:11) (97% - 99%)    Parameters below as of 31 Jul 2023 05:11  Patient On (Oxygen Delivery Method): room air        PHYSICAL EXAM:  General: NAD; speaking in full sentences  HEENT: NC/AT; PERRL; EOMI; MMM  Neck: supple; no JVD  Cardiac: RRR; +S1/S2  Pulm: CTA B/L; no W/R/R  GI: soft, NT/ND, +BS  Extremities: WWP; no edema, clubbing or cyanosis  Vasc: 2+ radial, DP pulses B/L  Neuro: AAOx3; no focal deficits    MEDICATIONS:  MEDICATIONS  (STANDING):  albuterol/ipratropium for Nebulization 3 milliLiter(s) Nebulizer every 6 hours  apixaban 5 milliGRAM(s) Oral every 12 hours  atorvastatin 40 milliGRAM(s) Oral at bedtime  clopidogrel Tablet 75 milliGRAM(s) Oral daily  DAPTOmycin IVPB 600 milliGRAM(s) IV Intermittent every 24 hours  dextrose 5%. 1000 milliLiter(s) (50 mL/Hr) IV Continuous <Continuous>  dextrose 5%. 1000 milliLiter(s) (100 mL/Hr) IV Continuous <Continuous>  dextrose 50% Injectable 12.5 Gram(s) IV Push once  dextrose 50% Injectable 25 Gram(s) IV Push once  dextrose 50% Injectable 25 Gram(s) IV Push once  ertapenem  IVPB 1000 milliGRAM(s) IV Intermittent every 24 hours  gabapentin 600 milliGRAM(s) Oral every 8 hours  glucagon  Injectable 1 milliGRAM(s) IntraMuscular once  insulin glargine Injectable (LANTUS) 50 Unit(s) SubCutaneous at bedtime  insulin glargine Injectable (LANTUS) 50 Unit(s) SubCutaneous every morning  insulin lispro (ADMELOG) corrective regimen sliding scale   SubCutaneous Before meals and at bedtime  insulin lispro Injectable (ADMELOG) 25 Unit(s) SubCutaneous three times a day before meals  metoprolol succinate ER 50 milliGRAM(s) Oral every 24 hours    MEDICATIONS  (PRN):  acetaminophen     Tablet .. 650 milliGRAM(s) Oral every 6 hours PRN Temp greater or equal to 38C (100.4F), Mild Pain (1 - 3)  dextrose Oral Gel 15 Gram(s) Oral once PRN Blood Glucose LESS THAN 70 milliGRAM(s)/deciliter  oxyCODONE    IR 5 milliGRAM(s) Oral every 6 hours PRN Severe Pain (7 - 10)  oxyCODONE    IR 10 milliGRAM(s) Oral every 12 hours PRN breakthrough pain      ALLERGIES:  Allergies    vancomycin (Anaphylaxis; Hives)  shellfish. (Hives; Anaphylaxis)  iodine containing compounds (Hives; Anaphylaxis)  clindamycin (Pruritus)  amoxicillin (Short breath; Rash)  penicillin (Hives)  fish (Hives; Urticaria)    Intolerances    Bactrim I.V. (Rash (Mod to Severe))      LABS:                        11.3   6.28  )-----------( 330      ( 30 Jul 2023 09:15 )             40.1     07-30    133<L>  |  102  |  11  ----------------------------<  365<H>  3.6   |  20<L>  |  0.70    Ca    8.4      30 Jul 2023 09:15  Phos  2.8     07-30  Mg     1.5     07-30    TPro  6.1  /  Alb  2.6<L>  /  TBili  <0.2  /  DBili  x   /  AST  11  /  ALT  8<L>  /  AlkPhos  144<H>  07-30    PT/INR - ( 30 Jul 2023 01:21 )   PT: 10.9 sec;   INR: 0.95          PTT - ( 30 Jul 2023 09:15 )  PTT:31.0 sec    RADIOLOGY & ADDITIONAL TESTS: Reviewed. OVERNIGHT EVENTS: EDIL    SUBJECTIVE:  The pt was seen and examined at the bedside. The pt states that she was in pain after the surgery but the medications alleviated this pain. She states that she continues to have abdominal pain, around the ostomy. The pt also states that her nausea is improved but not completely gone which is why she does not want to eat anything oral at this time. The pt also states that she has been having itchiness and grey vaginal discharge.     VITAL SIGNS:  Vital Signs Last 24 Hrs  T(C): 36.9 (31 Jul 2023 05:11), Max: 36.9 (30 Jul 2023 21:03)  T(F): 98.5 (31 Jul 2023 05:11), Max: 98.5 (30 Jul 2023 21:03)  HR: 93 (31 Jul 2023 05:11) (88 - 94)  BP: 119/76 (31 Jul 2023 05:11) (119/76 - 158/89)  BP(mean): 111 (30 Jul 2023 12:05) (111 - 111)  RR: 18 (31 Jul 2023 05:11) (18 - 20)  SpO2: 99% (31 Jul 2023 05:11) (97% - 99%)    Parameters below as of 31 Jul 2023 05:11  Patient On (Oxygen Delivery Method): room air        PHYSICAL EXAM:  General: NAD; speaking in full sentences  HEENT: Moist mucous membranes, EOMI; MMM  Neck: supple; no JVD  Cardiac: RRR; +S1/S2, no murmurs or gallops  Pulm: symmetric bilateral chest rises, CTA B/L; no W/R/R  GI: soft, NT/ND, +BS, ostomy bag c/d/i with formed stool in the bag.   Extremities: no edema, clubbing or cyanosis  Skin: Wound packed with gauzes, pt refused to be examined in the area as it was painful  Vasc: 2+ radial, DP pulses B/L  Neuro: AAOx3; no focal deficits    MEDICATIONS:  MEDICATIONS  (STANDING):  albuterol/ipratropium for Nebulization 3 milliLiter(s) Nebulizer every 6 hours  apixaban 5 milliGRAM(s) Oral every 12 hours  atorvastatin 40 milliGRAM(s) Oral at bedtime  clopidogrel Tablet 75 milliGRAM(s) Oral daily  DAPTOmycin IVPB 600 milliGRAM(s) IV Intermittent every 24 hours  dextrose 5%. 1000 milliLiter(s) (50 mL/Hr) IV Continuous <Continuous>  dextrose 5%. 1000 milliLiter(s) (100 mL/Hr) IV Continuous <Continuous>  dextrose 50% Injectable 12.5 Gram(s) IV Push once  dextrose 50% Injectable 25 Gram(s) IV Push once  dextrose 50% Injectable 25 Gram(s) IV Push once  ertapenem  IVPB 1000 milliGRAM(s) IV Intermittent every 24 hours  gabapentin 600 milliGRAM(s) Oral every 8 hours  glucagon  Injectable 1 milliGRAM(s) IntraMuscular once  insulin glargine Injectable (LANTUS) 50 Unit(s) SubCutaneous at bedtime  insulin glargine Injectable (LANTUS) 50 Unit(s) SubCutaneous every morning  insulin lispro (ADMELOG) corrective regimen sliding scale   SubCutaneous Before meals and at bedtime  insulin lispro Injectable (ADMELOG) 25 Unit(s) SubCutaneous three times a day before meals  metoprolol succinate ER 50 milliGRAM(s) Oral every 24 hours    MEDICATIONS  (PRN):  acetaminophen     Tablet .. 650 milliGRAM(s) Oral every 6 hours PRN Temp greater or equal to 38C (100.4F), Mild Pain (1 - 3)  dextrose Oral Gel 15 Gram(s) Oral once PRN Blood Glucose LESS THAN 70 milliGRAM(s)/deciliter  oxyCODONE    IR 5 milliGRAM(s) Oral every 6 hours PRN Severe Pain (7 - 10)  oxyCODONE    IR 10 milliGRAM(s) Oral every 12 hours PRN breakthrough pain      ALLERGIES:  Allergies    vancomycin (Anaphylaxis; Hives)  shellfish. (Hives; Anaphylaxis)  iodine containing compounds (Hives; Anaphylaxis)  clindamycin (Pruritus)  amoxicillin (Short breath; Rash)  penicillin (Hives)  fish (Hives; Urticaria)    Intolerances    Bactrim I.V. (Rash (Mod to Severe))      LABS:                        11.3   6.28  )-----------( 330      ( 30 Jul 2023 09:15 )             40.1     07-30    133<L>  |  102  |  11  ----------------------------<  365<H>  3.6   |  20<L>  |  0.70    Ca    8.4      30 Jul 2023 09:15  Phos  2.8     07-30  Mg     1.5     07-30    TPro  6.1  /  Alb  2.6<L>  /  TBili  <0.2  /  DBili  x   /  AST  11  /  ALT  8<L>  /  AlkPhos  144<H>  07-30    PT/INR - ( 30 Jul 2023 01:21 )   PT: 10.9 sec;   INR: 0.95          PTT - ( 30 Jul 2023 09:15 )  PTT:31.0 sec    RADIOLOGY & ADDITIONAL TESTS: Reviewed.

## 2023-07-31 NOTE — PROGRESS NOTE ADULT - PROBLEM SELECTOR PLAN 4
Per previous hospitalization, pt was on Lantus 50u in AM and 50u at bedtime. Also on Lispro 25u before meals  Glucose >300 on arrival  On ISS  Glucose this AM  Plan:  -c/w above as well as mISS Per previous hospitalization, pt was on Lantus 50u in AM and 50u at bedtime. Also on Lispro 25u before meals  Glucose >300 on arrival  Glucose at 200s now   On ISS  Plan:  -c/w above as well as mISS

## 2023-07-31 NOTE — PROGRESS NOTE ADULT - PROBLEM SELECTOR PLAN 3
Pt with Hx of abdominal necrotizing fascitis s/p ostomy placement  Pt wqith pain around the ostomy, on exam there is a hernia palpated at the ostomy site, tender to touch  CT Abdomen from 6/21 showing Fat-containing parastomal hernia, unchanged  Surgery following  Producing formed stool  Plan:  - Surgery states no intervention at this time as it is easily reducible  - Monitor ostomy output

## 2023-07-31 NOTE — PROGRESS NOTE ADULT - NS ATTEND AMEND GEN_ALL_CORE FT
#L labial abscess, L inguinal abscess, cellulitis, UTI    She underwent I&D yesterday.  7/30 L labial abscess culture growing staph haemolyticus and E. faecium.  7/30 L inguinal abscess growing MSSA.  7/28 L groin abscess grew MSSA.  7/27 UCx grew 100k MSSA and E faecalis.  All BCx ngtd.  top dapto/ertapenem, start linezolid 600mg PO q12h to cover all bacterial. f/u susceptibility of E faecium.    Team 2 will follow you.  Case d/w primary team.    Stephanie Brandon MD, MS  Infectious Disease attending  work cell [phone]   For any questions during evening/weekend/holiday, please page ID on call #L labial abscess, L inguinal abscess, cellulitis, UTI    She underwent I&D yesterday.  7/30 L labial abscess culture growing staph haemolyticus and E. faecium.  7/30 L inguinal abscess growing MSSA.  7/28 L groin abscess grew MSSA.  7/27 UCx grew 100k MSSA and E faecalis.  All BCx ngtd.  Stop dapto/ertapenem, start linezolid 600mg PO q12h to cover all bacterial. f/u susceptibility of E faecium.    Team 2 will follow you.  Case d/w primary team.    Stephanie Brandon MD, MS  Infectious Disease attending  work cell [phone]   For any questions during evening/weekend/holiday, please page ID on call

## 2023-07-31 NOTE — PROGRESS NOTE ADULT - SUBJECTIVE AND OBJECTIVE BOX
INFECTIOUS DISEASES CONSULT FOLLOW-UP NOTE    INTERVAL HPI/OVERNIGHT EVENTS:    Patient seen and examined at bedside. FAUSTINO. S/p I&D of L labia abscess on 7/30. Afebrile. Patient endorses pain near I&D site however overall improved from admission.       ROS:   Constitutional, eyes, ENT, cardiovascular, respiratory, gastrointestinal, genitourinary, integumentary, neurological, psychiatric and heme/lymph are otherwise negative other than noted above       ANTIBIOTICS/RELEVANT:    MEDICATIONS  (STANDING):  albuterol/ipratropium for Nebulization 3 milliLiter(s) Nebulizer every 6 hours  atorvastatin 40 milliGRAM(s) Oral at bedtime  clopidogrel Tablet 75 milliGRAM(s) Oral daily  dextrose 5%. 1000 milliLiter(s) (100 mL/Hr) IV Continuous <Continuous>  dextrose 5%. 1000 milliLiter(s) (50 mL/Hr) IV Continuous <Continuous>  dextrose 50% Injectable 25 Gram(s) IV Push once  dextrose 50% Injectable 12.5 Gram(s) IV Push once  dextrose 50% Injectable 25 Gram(s) IV Push once  gabapentin 600 milliGRAM(s) Oral every 8 hours  glucagon  Injectable 1 milliGRAM(s) IntraMuscular once  insulin glargine Injectable (LANTUS) 50 Unit(s) SubCutaneous at bedtime  insulin glargine Injectable (LANTUS) 50 Unit(s) SubCutaneous every morning  insulin lispro (ADMELOG) corrective regimen sliding scale   SubCutaneous Before meals and at bedtime  insulin lispro Injectable (ADMELOG) 25 Unit(s) SubCutaneous three times a day before meals  linezolid    Tablet 600 milliGRAM(s) Oral every 12 hours  metoprolol succinate ER 50 milliGRAM(s) Oral every 24 hours  pantoprazole    Tablet 40 milliGRAM(s) Oral before breakfast    MEDICATIONS  (PRN):  acetaminophen     Tablet .. 650 milliGRAM(s) Oral every 6 hours PRN Temp greater or equal to 38C (100.4F), Mild Pain (1 - 3)  dextrose Oral Gel 15 Gram(s) Oral once PRN Blood Glucose LESS THAN 70 milliGRAM(s)/deciliter  oxyCODONE    IR 5 milliGRAM(s) Oral every 6 hours PRN Severe Pain (7 - 10)  oxyCODONE    IR 10 milliGRAM(s) Oral every 12 hours PRN breakthrough pain        Vital Signs Last 24 Hrs  T(C): 36.4 (31 Jul 2023 12:05), Max: 36.9 (30 Jul 2023 21:03)  T(F): 97.6 (31 Jul 2023 12:05), Max: 98.5 (30 Jul 2023 21:03)  HR: 90 (31 Jul 2023 12:05) (88 - 93)  BP: 130/67 (31 Jul 2023 12:05) (119/76 - 155/82)  BP(mean): --  RR: 18 (31 Jul 2023 12:05) (18 - 18)  SpO2: 100% (31 Jul 2023 12:05) (97% - 100%)    Parameters below as of 31 Jul 2023 12:05  Patient On (Oxygen Delivery Method): room air        PHYSICAL EXAM:  Constitutional: alert, NAD  Eyes: the sclera and conjunctiva were normal.   ENT: the ears and nose were normal in appearance.   Neck: the appearance of the neck was normal and the neck was supple.   Pulmonary: no respiratory distress and lungs were clear to auscultation bilaterally.   Heart: heart rate was normal and rhythm regular, normal S1 and S2  Vascular:. there was no peripheral edema  Abdomen: normal bowel sounds, soft, non-tender  Derm: L labia I&D site packed- c/d/i  Neurological: no focal deficits.   Psychiatric: the affect was normal        LABS:                        11.3   7.63  )-----------( 393      ( 31 Jul 2023 05:30 )             37.6     07-31    138  |  99  |  10  ----------------------------<  230<H>  3.4<L>   |  26  |  0.74    Ca    8.2<L>      31 Jul 2023 05:30  Phos  4.1     07-31  Mg     1.8     07-31    TPro  6.5  /  Alb  3.1<L>  /  TBili  <0.2  /  DBili  x   /  AST  13  /  ALT  10  /  AlkPhos  149<H>  07-31    PT/INR - ( 30 Jul 2023 01:21 )   PT: 10.9 sec;   INR: 0.95          PTT - ( 30 Jul 2023 09:15 )  PTT:31.0 sec  Urinalysis Basic - ( 31 Jul 2023 05:30 )    Color: x / Appearance: x / SG: x / pH: x  Gluc: 230 mg/dL / Ketone: x  / Bili: x / Urobili: x   Blood: x / Protein: x / Nitrite: x   Leuk Esterase: x / RBC: x / WBC x   Sq Epi: x / Non Sq Epi: x / Bacteria: x        MICROBIOLOGY:  reviewed     RADIOLOGY & ADDITIONAL STUDIES:  Reviewed

## 2023-07-31 NOTE — PROGRESS NOTE ADULT - PROBLEM SELECTOR PLAN 8
History of bilateral pulmonary emboli in segmental and subsegmental branches on the R and subsegmental branches on the L, diagnosed on CT chest during ED visit on 4/7/23. Home meds: Eliquis 5mg BID  No respiratory distress at this time   Plan:  - Held Eliquis for possible debridement   - On Heparin Full AC History of bilateral pulmonary emboli in segmental and subsegmental branches on the R and subsegmental branches on the L, diagnosed on CT chest during ED visit on 4/7/23. Home meds: Eliquis 5mg BID  No respiratory distress at this time   Plan:  - c/w Eliquis 5mg q12h   - On Heparin Full AC F: None  E: replete K>4 Mg>2  N: consistent carb  D: On Eliquis and Plavix  Dispo: [facility] F: None  E: replete K>4 Mg>2  N: consistent carb  D: On Eliquis and Plavix, HOLD ELIQUIS AFTER MIDNIGHT   Dispo: [facility]

## 2023-07-31 NOTE — PROGRESS NOTE ADULT - PROBLEM SELECTOR PLAN 6
Pt presenting with lactate of 3.7, which resolved to 1.5 after receiving IVF. Otherwise does not meet other SIRS criteria. Of note, during previous hospitalization also with elevated serum lactate, which was deemed to not be secondary to infection Pt was admitted to cardiac service during last admission. Pt underwent CCTA on 06/23/2023 revealing Ca score 129 (accuracy of score is limited by image noise), probable severe pRCA stenosis, mod D2 stenosis, non obstructive in remaining coronaries. Pt is s/p cardiac angiogram on 6/26 and is s/p IVUS guided NAT pRCA (85%), NAT RPLS (80%); residual dLCx/OM1 70%, mLAD 50%. Pt was instructed to return for staged PCI of LCx and OM1 only if symptomatic  Pt having chest pain, ECG wnl, troponin neg  Cardiology following   Plan:   -c/w Atorvastatin 40 at bedtime, Plavix 75mg PO qd,   - c/w toprol 50mg PO q24h (hold HR <60, SBP <100)   - Do not hold AC for >24hrs as pt had recent NAT Pt was admitted to cardiac service during last admission. Pt underwent CCTA on 06/23/2023 revealing Ca score 129 (accuracy of score is limited by image noise), probable severe pRCA stenosis, mod D2 stenosis, non obstructive in remaining coronaries. Pt is s/p cardiac angiogram on 6/26 and is s/p IVUS guided NAT pRCA (85%), NAT RPLS (80%); residual dLCx/OM1 70%, mLAD 50%. Pt was instructed to return for staged PCI of LCx and OM1 only if symptomatic  Pt having chest pain, ECG wnl, troponin neg  Cardiology following   Plan:   -c/w Atorvastatin 40 at bedtime, Plavix 75mg PO qd  - c/w toprol 50mg PO q24h (hold HR <60, SBP <100)   - Hold Eliquis after midnight and keep pt NPO for procedure tomorrow

## 2023-07-31 NOTE — PROGRESS NOTE ADULT - PROBLEM SELECTOR PLAN 1
Purulent   Pt reports a few days ago, she noticed a pimple to her left labia, which now has progressed to swelling, erythema, tenderness extending from the L labia to inner thigh. States she initially noted purulent and bloody drainage.   On physical exam, bloody and purulent drainage noted from site, very tender to palpation and erythematous   s/p Ertapenem 1g in the ED  Zosyn from 7/28-7/29  Wound Cx: MRSA  BCx: NGTD  On Daptomycin 600mg Q24H and Ertapenem 1g q24h per ID recs  s/p I&D of the abscess by surgery on 7/30  Plan  - Continue with abx as above, has been refusing her Daptomycin due fear of allergy  - c/w Oxycodone 10mg for breakthrough pain and Oxycodone 5mg for severe pain  - Tylenol q6h PRN Q6H for fever Purulent   Pt reports a few days ago, she noticed a pimple to her left labia, which now has progressed to swelling, erythema, tenderness extending from the L labia to inner thigh. States she initially noted purulent and bloody drainage.   On physical exam, bloody and purulent drainage noted from site, very tender to palpation and erythematous   s/p Ertapenem 1g in the ED  Zosyn from 7/28-7/29  Daptomycin 600mg Q24H and Ertapenem 1g q24h from 7/29-7/30  Wound Cx: Staphylococcus aureus  BCx: NGTD  s/p I&D of the abscess by surgery on 7/30  Plan  - Continue with abx as above, has been refusing her Daptomycin due fear of allergy  - c/w Oxycodone 10mg for breakthrough pain and Oxycodone 5mg for severe pain  - Tylenol q6h PRN Q6H for fever Purulent   Pt reports a few days ago, she noticed a pimple to her left labia, which now has progressed to swelling, erythema, tenderness extending from the L labia to inner thigh. States she initially noted purulent and bloody drainage.   On physical exam, bloody and purulent drainage noted from site, very tender to palpation and erythematous   s/p Ertapenem 1g in the ED  s/p Zosyn from 7/28-7/29  s/p Daptomycin 600mg Q24H and Ertapenem 1g q24h from 7/29-7/30  Wound Cx: Staphylococcus aureus  BCx: NGTD  s/p I&D of the abscess by surgery on 7/30, s/p Dilaudid in pm for pain 0.5mg x1  Plan  - Switched to cefazolin 2g q8h on 7/31   - c/w Oxycodone 10mg for breakthrough pain and Oxycodone 5mg for severe pain  - Tylenol q6h PRN Q6H for fever Purulent   Pt reports a few days ago, she noticed a pimple to her left labia, which now has progressed to swelling, erythema, tenderness extending from the L labia to inner thigh. States she initially noted purulent and bloody drainage.   On physical exam, bloody and purulent drainage noted from site, very tender to palpation and erythematous   s/p Ertapenem 1g in the ED  s/p Zosyn from 7/28-7/29  s/p Daptomycin 600mg Q24H and Ertapenem 1g q24h from 7/29-7/30  Wound Cx: Staphylococcus aureus  BCx: NGTD  s/p I&D of the abscess by surgery on 7/30, s/p Dilaudid in pm for pain 0.5mg x1  Plan  - Switched to cefazolin 2g q8h on 7/31   - c/w Oxycodone 10mg for breakthrough pain and Oxycodone 5mg for severe pain  - Tylenol q6h PRN Q6H for fever  - PT consult Purulent   Pt reports a few days ago, she noticed a pimple to her left labia, which now has progressed to swelling, erythema, tenderness extending from the L labia to inner thigh. States she initially noted purulent and bloody drainage.   On physical exam, bloody and purulent drainage noted from site, very tender to palpation and erythematous   s/p Ertapenem 1g in the ED  s/p Zosyn from 7/28-7/29  s/p Daptomycin 600mg Q24H and Ertapenem 1g q24h from 7/29-7/30  Wound Cx: Staphylococcus aureus  BCx: NGTD  s/p I&D of the abscess by surgery on 7/30, s/p Dilaudid in pm for pain 0.5mg x1  Plan  - Switched to Linezolid 600mg PO Q12H on 7/31 per ID recs following sensitivities   - c/w Oxycodone 10mg for breakthrough pain and Oxycodone 5mg for severe pain  - Tylenol q6h PRN Q6H for fever  - PT consult

## 2023-07-31 NOTE — PROGRESS NOTE ADULT - SUBJECTIVE AND OBJECTIVE BOX
SUBJECTIVE:   Patient seen and examined at bedside this AM   No acute overnight events   Pain poorly controlled per patient; states she is getting less than her home Narc dose   She denied fever/chills, nausea or emesis overnight       MEDICATIONS  (STANDING):  albuterol/ipratropium for Nebulization 3 milliLiter(s) Nebulizer every 6 hours  atorvastatin 40 milliGRAM(s) Oral at bedtime  clopidogrel Tablet 75 milliGRAM(s) Oral daily  dextrose 5%. 1000 milliLiter(s) (50 mL/Hr) IV Continuous <Continuous>  dextrose 5%. 1000 milliLiter(s) (100 mL/Hr) IV Continuous <Continuous>  dextrose 50% Injectable 12.5 Gram(s) IV Push once  dextrose 50% Injectable 25 Gram(s) IV Push once  dextrose 50% Injectable 25 Gram(s) IV Push once  gabapentin 600 milliGRAM(s) Oral every 8 hours  glucagon  Injectable 1 milliGRAM(s) IntraMuscular once  insulin glargine Injectable (LANTUS) 50 Unit(s) SubCutaneous every morning  insulin glargine Injectable (LANTUS) 50 Unit(s) SubCutaneous at bedtime  insulin lispro (ADMELOG) corrective regimen sliding scale   SubCutaneous Before meals and at bedtime  insulin lispro Injectable (ADMELOG) 25 Unit(s) SubCutaneous three times a day before meals  linezolid    Tablet 600 milliGRAM(s) Oral every 12 hours  metoprolol succinate ER 50 milliGRAM(s) Oral every 24 hours  pantoprazole    Tablet 40 milliGRAM(s) Oral before breakfast    MEDICATIONS  (PRN):  acetaminophen     Tablet .. 650 milliGRAM(s) Oral every 6 hours PRN Temp greater or equal to 38C (100.4F), Mild Pain (1 - 3)  dextrose Oral Gel 15 Gram(s) Oral once PRN Blood Glucose LESS THAN 70 milliGRAM(s)/deciliter  oxyCODONE    IR 5 milliGRAM(s) Oral every 6 hours PRN Severe Pain (7 - 10)  oxyCODONE    IR 10 milliGRAM(s) Oral every 12 hours PRN breakthrough pain      Vital Signs Last 24 Hrs  T(C): 36.4 (31 Jul 2023 12:05), Max: 36.9 (30 Jul 2023 21:03)  T(F): 97.6 (31 Jul 2023 12:05), Max: 98.5 (30 Jul 2023 21:03)  HR: 90 (31 Jul 2023 12:05) (88 - 93)  BP: 130/67 (31 Jul 2023 12:05) (119/76 - 155/82)  BP(mean): --  RR: 18 (31 Jul 2023 12:05) (18 - 18)  SpO2: 100% (31 Jul 2023 12:05) (97% - 100%)    Parameters below as of 31 Jul 2023 12:05  Patient On (Oxygen Delivery Method): room air        Physical Exam:  General Appearance: Appears well, NAD  Pulmonary: Nonlabored breathing, no respiratory distress  Cardiovascular: Normal heart rate   Abdomen: Soft, nondistended, colostomy bag in place with stool  Groin: Lt sided groin site with clean wound bed and no evidence of purulence or drainage s/p I&D   Extremities: SCD's in place  I&O's Summary      LABS:                        11.3   7.63  )-----------( 393      ( 31 Jul 2023 05:30 )             37.6     07-31    138  |  99  |  10  ----------------------------<  230<H>  3.4<L>   |  26  |  0.74    Ca    8.2<L>      31 Jul 2023 05:30  Phos  4.1     07-31  Mg     1.8     07-31    TPro  6.5  /  Alb  3.1<L>  /  TBili  <0.2  /  DBili  x   /  AST  13  /  ALT  10  /  AlkPhos  149<H>  07-31    PT/INR - ( 30 Jul 2023 01:21 )   PT: 10.9 sec;   INR: 0.95          PTT - ( 30 Jul 2023 09:15 )  PTT:31.0 sec  Urinalysis Basic - ( 31 Jul 2023 05:30 )    Color: x / Appearance: x / SG: x / pH: x  Gluc: 230 mg/dL / Ketone: x  / Bili: x / Urobili: x   Blood: x / Protein: x / Nitrite: x   Leuk Esterase: x / RBC: x / WBC x   Sq Epi: x / Non Sq Epi: x / Bacteria: x      CAPILLARY BLOOD GLUCOSE      POCT Blood Glucose.: 221 mg/dL (31 Jul 2023 12:35)  POCT Blood Glucose.: 238 mg/dL (31 Jul 2023 08:17)  POCT Blood Glucose.: 258 mg/dL (30 Jul 2023 21:52)  POCT Blood Glucose.: 260 mg/dL (30 Jul 2023 17:59)    LIVER FUNCTIONS - ( 31 Jul 2023 05:30 )  Alb: 3.1 g/dL / Pro: 6.5 g/dL / ALK PHOS: 149 U/L / ALT: 10 U/L / AST: 13 U/L / GGT: x             RADIOLOGY & ADDITIONAL STUDIES:

## 2023-07-31 NOTE — PROGRESS NOTE ADULT - PROBLEM SELECTOR PLAN 5
Pt reports over the last few days she has been having increased ostomy output. No blood or dark stool  - f/u GI PCR    #HypoK  K of 3.2 on admission, may be in setting of diarrhea.   given Kcl 80meq in the ED, repeat BMP in AM Resolved  Pt reports over the last few days she has been having increased ostomy output. No blood or dark stool  - f/u GI PCR    #HypoK  K of 3.2 on admission, may be in setting of diarrhea.   given Kcl 80meq in the ED, repeat BMP in AM

## 2023-07-31 NOTE — PROGRESS NOTE ADULT - ASSESSMENT
43F h/o CVA in 11/2021 with residual L>R weakness, PE on eliquis, uncontrolled T2DM on insulin, abdominal necrotizing fasciitis s/p ostomy in 11/2021, frequent UTI 2/2 suprapubic cath now removed p/w feeling unwell and purulent drainage and pain at L labia, found to have L labial abscess and UTI. Urine culture 7/27 growing MSSA and E. faecalis. L labia abscess drainage culture 7/28 growing MSSA. L inguinal abscess I&D culture 7/30 growing MSSA. L labial abscess culture 7/30 growing Staph haemolyticus and E. faecium (sensitivities pending). BCxs ngtd    Suggest:  -F/u abscess cultures and sensitivities    -Stop cefazolin and macrobid   -Start Linezolid 600 mg PO q12h    Team 2 will follow you.    Case d/w primary team.  Final recommendation pending attending note.    Aide Mckeon, Infectious Diseases PA  Please reach out for any questions 9 am-5pm. For evenings and weekends, please call the ID physician on call.  Work cell: [phone]

## 2023-07-31 NOTE — PROGRESS NOTE ADULT - ASSESSMENT
43y F w/ PMHx Asthma, CVA (11/2021; residual L>R weakness), PE (4/2023 on Eliquis), T2DM, HTN, HLD, hx abdominal necrotizing fasciitis s/p diverting colostomy creation (11/2021), with intubation from 11-12/2021, hx of frequent UTIs 2/2 to suprapubic catheter, w/ most recently UTI c/b Urosepsis and d/c'd w/ midline for Ertapenem, recent cardiac stents now presenting due to generally not feeling well and concern for recurrent UTI, admitted for management of cellulitis. General surgery consulted for evaluation of parastomal hernia just superior to colostomy. Patient without any symptoms of obstruction, w/out any abdominal pain currently, w/out any overlying skin changes or erythema. Left groin with abscess that was actively draining while at bedside. Now s/p L groin I & D    Plan:  #Parastomal Hernia  - Stool per ostomy. No acute surgical intervention at this time for parastomal hernia.     #L. Groin Abscess  - Okay for Eliquis  - Daily packing changes with dry packing strip or gauze strip, and ABD.   - Abx per primary/ID   - No further Surgical intervention needed

## 2023-07-31 NOTE — PROGRESS NOTE ADULT - PROBLEM SELECTOR PLAN 7
Pt was admitted to cardiac service during last admission. Pt underwent CCTA on 06/23/2023 revealing Ca score 129 (accuracy of score is limited by image noise), probable severe pRCA stenosis, mod D2 stenosis, non obstructive in remaining coronaries. Pt is s/p cardiac angiogram on 6/26 and is s/p IVUS guided NAT pRCA (85%), NAT RPLS (80%); residual dLCx/OM1 70%, mLAD 50%. Pt was instructed to return for staged PCI of LCx and OM1 only if symptomatic  Plan:   -c/w Atorvastatin 40, Plavix 75mg PO qD, toprol 50mg PO qD Pt was admitted to cardiac service during last admission. Pt underwent CCTA on 06/23/2023 revealing Ca score 129 (accuracy of score is limited by image noise), probable severe pRCA stenosis, mod D2 stenosis, non obstructive in remaining coronaries. Pt is s/p cardiac angiogram on 6/26 and is s/p IVUS guided NAT pRCA (85%), NAT RPLS (80%); residual dLCx/OM1 70%, mLAD 50%. Pt was instructed to return for staged PCI of LCx and OM1 only if symptomatic  Pt having chest pain, ECG wnl, troponin neg  Cardiology following   Plan:   -c/w Atorvastatin 40 at bedtime, Plavix 75mg PO qd,   - c/w toprol 50mg PO q24h (hold HR <60, SBP <100)   - Do not hold AC for >24hrs History of bilateral pulmonary emboli in segmental and subsegmental branches on the R and subsegmental branches on the L, diagnosed on CT chest during ED visit on 4/7/23. Home meds: Eliquis 5mg BID  No respiratory distress at this time   Plan:  - c/w Eliquis 5mg q12h   - On Heparin Full AC  - PT consult

## 2023-07-31 NOTE — PROGRESS NOTE ADULT - PROBLEM SELECTOR PLAN 2
pt reports ongoing hx of dysuria, which worsened the past few days. Pt has a history of recurrent UTIs, which was attributed to suprapubic catheter. Catheter was removed during previous hospitalization in June  Plan:  - Initial UA negative of WBCs, leuk esterase and nitrites  - Monitor for symptoms  - Monitor vitals and daily CBC pt reports ongoing hx of dysuria, which worsened the past few days. Pt has a history of recurrent UTIs, which was attributed to suprapubic catheter. Catheter was removed during previous hospitalization in June  Initial UA negative of WBCs, leuk esterase and nitrites  Urine Cx: Staph aureus and enterococcus faecalis with sensitivities  Plan:  - Started on Macrobid 100mg q12h for 5 days (day 1)   - Monitor for symptoms  - Monitor vitals and daily CBC pt reports ongoing hx of dysuria, which worsened the past few days. Pt has a history of recurrent UTIs, which was attributed to suprapubic catheter. Catheter was removed during previous hospitalization in June  Initial UA negative of WBCs, leuk esterase and nitrites  Urine Cx: Staph aureus and enterococcus faecalis with sensitivities  Received Zosyn and Ertapenem for this symptoms   Asymptomatic at this time  Plan:  - No abx at this time   - Monitor for symptoms  - Monitor vitals and daily CBC

## 2023-07-31 NOTE — PROGRESS NOTE ADULT - SUBJECTIVE AND OBJECTIVE BOX
incomplete note Cardiology Consult    O/N:  Interval History/HPI: Pt seen and examined at bedside. Complaining of migraine, improved with eyes closed. Reports still having feeling of elephant on her chest intermittently that worries her. Currently without CP but since prior [facility] it continues when she gets emotional.  Was lost to outpatient follow up because of insurance issues. Has been compliant with ASA and plavix.    OBJECTIVE  T(C): 36.4 (07-31-23 @ 12:05), Max: 36.9 (07-30-23 @ 21:03)  HR: 90 (07-31-23 @ 12:05) (88 - 93)  BP: 130/67 (07-31-23 @ 12:05) (119/76 - 155/82)  RR: 18 (07-31-23 @ 12:05) (18 - 18)  SpO2: 100% (07-31-23 @ 12:05) (97% - 100%)      PHYSICAL EXAM:    Constitutional: eyes closed, appears mildly uncomfortable (reporting migraine)  Respiratory: CTA B/L  Cardiac: +S1/S2; RRR; no M/R/G; PMI non-displaced  Gastrointestinal: soft, NT/ND; no rebound or guarding; +BSx4  Extremities: WWP; no peripheral edema  Neurologic: AAOx3; CNII-XII grossly intact; no focal deficits    LABS:                        11.3   7.63  )-----------( 393      ( 31 Jul 2023 05:30 )             37.6     07-31    138  |  99  |  10  ----------------------------<  230<H>  3.4<L>   |  26  |  0.74    Ca    8.2<L>      31 Jul 2023 05:30  Phos  4.1     07-31  Mg     1.8     07-31    TPro  6.5  /  Alb  3.1<L>  /  TBili  <0.2  /  DBili  x   /  AST  13  /  ALT  10  /  AlkPhos  149<H>  07-31    PT/INR - ( 30 Jul 2023 01:21 )   PT: 10.9 sec;   INR: 0.95          PTT - ( 30 Jul 2023 09:15 )  PTT:31.0 sec  Urinalysis Basic - ( 31 Jul 2023 05:30 )    Color: x / Appearance: x / SG: x / pH: x  Gluc: 230 mg/dL / Ketone: x  / Bili: x / Urobili: x   Blood: x / Protein: x / Nitrite: x   Leuk Esterase: x / RBC: x / WBC x   Sq Epi: x / Non Sq Epi: x / Bacteria: x        RADIOLOGY & ADDITIONAL TESTS:  Reviewed .    MEDICATIONS  (STANDING):  albuterol/ipratropium for Nebulization 3 milliLiter(s) Nebulizer every 6 hours  atorvastatin 80 milliGRAM(s) Oral at bedtime  clopidogrel Tablet 75 milliGRAM(s) Oral daily  dextrose 5%. 1000 milliLiter(s) (50 mL/Hr) IV Continuous <Continuous>  dextrose 5%. 1000 milliLiter(s) (100 mL/Hr) IV Continuous <Continuous>  dextrose 50% Injectable 25 Gram(s) IV Push once  dextrose 50% Injectable 25 Gram(s) IV Push once  dextrose 50% Injectable 12.5 Gram(s) IV Push once  gabapentin 600 milliGRAM(s) Oral every 8 hours  glucagon  Injectable 1 milliGRAM(s) IntraMuscular once  insulin glargine Injectable (LANTUS) 50 Unit(s) SubCutaneous every morning  insulin glargine Injectable (LANTUS) 50 Unit(s) SubCutaneous at bedtime  insulin lispro (ADMELOG) corrective regimen sliding scale   SubCutaneous Before meals and at bedtime  insulin lispro Injectable (ADMELOG) 25 Unit(s) SubCutaneous three times a day before meals  linezolid    Tablet 600 milliGRAM(s) Oral every 12 hours  metoprolol succinate ER 50 milliGRAM(s) Oral every 24 hours  pantoprazole    Tablet 40 milliGRAM(s) Oral before breakfast    MEDICATIONS  (PRN):  acetaminophen     Tablet .. 650 milliGRAM(s) Oral every 6 hours PRN Temp greater or equal to 38C (100.4F), Mild Pain (1 - 3)  dextrose Oral Gel 15 Gram(s) Oral once PRN Blood Glucose LESS THAN 70 milliGRAM(s)/deciliter  oxyCODONE    IR 5 milliGRAM(s) Oral every 6 hours PRN Severe Pain (7 - 10)  oxyCODONE    IR 10 milliGRAM(s) Oral every 12 hours PRN breakthrough pain

## 2023-08-01 ENCOUNTER — APPOINTMENT (OUTPATIENT)
Dept: HEART AND VASCULAR | Facility: CLINIC | Age: 43
End: 2023-08-01

## 2023-08-01 DIAGNOSIS — I20.8 OTHER FORMS OF ANGINA PECTORIS: ICD-10-CM

## 2023-08-01 DIAGNOSIS — N89.8 OTHER SPECIFIED NONINFLAMMATORY DISORDERS OF VAGINA: ICD-10-CM

## 2023-08-01 LAB
-  CLINDAMYCIN: SIGNIFICANT CHANGE UP
-  CLINDAMYCIN: SIGNIFICANT CHANGE UP
-  ERYTHROMYCIN: SIGNIFICANT CHANGE UP
-  ERYTHROMYCIN: SIGNIFICANT CHANGE UP
-  LINEZOLID: SIGNIFICANT CHANGE UP
-  LINEZOLID: SIGNIFICANT CHANGE UP
-  OXACILLIN: SIGNIFICANT CHANGE UP
-  OXACILLIN: SIGNIFICANT CHANGE UP
-  RIFAMPIN: SIGNIFICANT CHANGE UP
-  RIFAMPIN: SIGNIFICANT CHANGE UP
-  TRIMETHOPRIM/SULFAMETHOXAZOLE: SIGNIFICANT CHANGE UP
-  TRIMETHOPRIM/SULFAMETHOXAZOLE: SIGNIFICANT CHANGE UP
-  VANCOMYCIN: SIGNIFICANT CHANGE UP
-  VANCOMYCIN: SIGNIFICANT CHANGE UP
ALBUMIN SERPL ELPH-MCNC: 2.8 G/DL — LOW (ref 3.3–5)
ALP SERPL-CCNC: 148 U/L — HIGH (ref 40–120)
ALT FLD-CCNC: 9 U/L — LOW (ref 10–45)
ANION GAP SERPL CALC-SCNC: 10 MMOL/L — SIGNIFICANT CHANGE UP (ref 5–17)
ANISOCYTOSIS BLD QL: SIGNIFICANT CHANGE UP
APTT BLD: 31.3 SEC — SIGNIFICANT CHANGE UP (ref 24.5–35.6)
AST SERPL-CCNC: 11 U/L — SIGNIFICANT CHANGE UP (ref 10–40)
BASOPHILS # BLD AUTO: 0 K/UL — SIGNIFICANT CHANGE UP (ref 0–0.2)
BASOPHILS NFR BLD AUTO: 0 % — SIGNIFICANT CHANGE UP (ref 0–2)
BILIRUB SERPL-MCNC: <0.2 MG/DL — SIGNIFICANT CHANGE UP (ref 0.2–1.2)
BLD GP AB SCN SERPL QL: POSITIVE — SIGNIFICANT CHANGE UP
BUN SERPL-MCNC: 14 MG/DL — SIGNIFICANT CHANGE UP (ref 7–23)
CALCIUM SERPL-MCNC: 8.6 MG/DL — SIGNIFICANT CHANGE UP (ref 8.4–10.5)
CHLORIDE SERPL-SCNC: 100 MMOL/L — SIGNIFICANT CHANGE UP (ref 96–108)
CO2 SERPL-SCNC: 26 MMOL/L — SIGNIFICANT CHANGE UP (ref 22–31)
CREAT SERPL-MCNC: 0.73 MG/DL — SIGNIFICANT CHANGE UP (ref 0.5–1.3)
CULTURE RESULTS: SIGNIFICANT CHANGE UP
EGFR: 105 ML/MIN/1.73M2 — SIGNIFICANT CHANGE UP
EOSINOPHIL # BLD AUTO: 0.07 K/UL — SIGNIFICANT CHANGE UP (ref 0–0.5)
EOSINOPHIL NFR BLD AUTO: 0.9 % — SIGNIFICANT CHANGE UP (ref 0–6)
GIANT PLATELETS BLD QL SMEAR: PRESENT — SIGNIFICANT CHANGE UP
GLUCOSE BLDC GLUCOMTR-MCNC: 189 MG/DL — HIGH (ref 70–99)
GLUCOSE BLDC GLUCOMTR-MCNC: 189 MG/DL — HIGH (ref 70–99)
GLUCOSE BLDC GLUCOMTR-MCNC: 242 MG/DL — HIGH (ref 70–99)
GLUCOSE BLDC GLUCOMTR-MCNC: 258 MG/DL — HIGH (ref 70–99)
GLUCOSE BLDC GLUCOMTR-MCNC: 368 MG/DL — HIGH (ref 70–99)
GLUCOSE SERPL-MCNC: 345 MG/DL — HIGH (ref 70–99)
HCT VFR BLD CALC: 35.8 % — SIGNIFICANT CHANGE UP (ref 34.5–45)
HGB BLD-MCNC: 10.6 G/DL — LOW (ref 11.5–15.5)
HYPOCHROMIA BLD QL: SLIGHT — SIGNIFICANT CHANGE UP
INR BLD: 0.97 — SIGNIFICANT CHANGE UP (ref 0.85–1.18)
LYMPHOCYTES # BLD AUTO: 2.27 K/UL — SIGNIFICANT CHANGE UP (ref 1–3.3)
LYMPHOCYTES # BLD AUTO: 29.8 % — SIGNIFICANT CHANGE UP (ref 13–44)
MAGNESIUM SERPL-MCNC: 1.6 MG/DL — SIGNIFICANT CHANGE UP (ref 1.6–2.6)
MANUAL SMEAR VERIFICATION: SIGNIFICANT CHANGE UP
MCHC RBC-ENTMCNC: 23.8 PG — LOW (ref 27–34)
MCHC RBC-ENTMCNC: 29.6 GM/DL — LOW (ref 32–36)
MCV RBC AUTO: 80.3 FL — SIGNIFICANT CHANGE UP (ref 80–100)
METHOD TYPE: SIGNIFICANT CHANGE UP
METHOD TYPE: SIGNIFICANT CHANGE UP
MICROCYTES BLD QL: SIGNIFICANT CHANGE UP
MONOCYTES # BLD AUTO: 0.67 K/UL — SIGNIFICANT CHANGE UP (ref 0–0.9)
MONOCYTES NFR BLD AUTO: 8.8 % — SIGNIFICANT CHANGE UP (ref 2–14)
MYELOCYTES NFR BLD: 0.9 % — HIGH (ref 0–0)
NEUTROPHILS # BLD AUTO: 4.54 K/UL — SIGNIFICANT CHANGE UP (ref 1.8–7.4)
NEUTROPHILS NFR BLD AUTO: 59.6 % — SIGNIFICANT CHANGE UP (ref 43–77)
ORGANISM # SPEC MICROSCOPIC CNT: SIGNIFICANT CHANGE UP
ORGANISM # SPEC MICROSCOPIC CNT: SIGNIFICANT CHANGE UP
OVALOCYTES BLD QL SMEAR: SLIGHT — SIGNIFICANT CHANGE UP
PHOSPHATE SERPL-MCNC: 3 MG/DL — SIGNIFICANT CHANGE UP (ref 2.5–4.5)
PLAT MORPH BLD: ABNORMAL
PLATELET # BLD AUTO: 373 K/UL — SIGNIFICANT CHANGE UP (ref 150–400)
POIKILOCYTOSIS BLD QL AUTO: SLIGHT — SIGNIFICANT CHANGE UP
POLYCHROMASIA BLD QL SMEAR: SLIGHT — SIGNIFICANT CHANGE UP
POTASSIUM SERPL-MCNC: 3.9 MMOL/L — SIGNIFICANT CHANGE UP (ref 3.5–5.3)
POTASSIUM SERPL-SCNC: 3.9 MMOL/L — SIGNIFICANT CHANGE UP (ref 3.5–5.3)
PROT SERPL-MCNC: 6.3 G/DL — SIGNIFICANT CHANGE UP (ref 6–8.3)
PROTHROM AB SERPL-ACNC: 11.1 SEC — SIGNIFICANT CHANGE UP (ref 9.5–13)
RBC # BLD: 4.46 M/UL — SIGNIFICANT CHANGE UP (ref 3.8–5.2)
RBC # FLD: 15.9 % — HIGH (ref 10.3–14.5)
RBC BLD AUTO: ABNORMAL
RH IG SCN BLD-IMP: NEGATIVE — SIGNIFICANT CHANGE UP
SODIUM SERPL-SCNC: 136 MMOL/L — SIGNIFICANT CHANGE UP (ref 135–145)
SPECIMEN SOURCE: SIGNIFICANT CHANGE UP
SPHEROCYTES BLD QL SMEAR: SLIGHT — SIGNIFICANT CHANGE UP
WBC # BLD: 7.61 K/UL — SIGNIFICANT CHANGE UP (ref 3.8–10.5)
WBC # FLD AUTO: 7.61 K/UL — SIGNIFICANT CHANGE UP (ref 3.8–10.5)

## 2023-08-01 PROCEDURE — 92928 PRQ TCAT PLMT NTRAC ST 1 LES: CPT | Mod: LC

## 2023-08-01 PROCEDURE — 99232 SBSQ HOSP IP/OBS MODERATE 35: CPT

## 2023-08-01 PROCEDURE — 99233 SBSQ HOSP IP/OBS HIGH 50: CPT | Mod: GC

## 2023-08-01 PROCEDURE — 99152 MOD SED SAME PHYS/QHP 5/>YRS: CPT

## 2023-08-01 RX ORDER — MAGNESIUM SULFATE 500 MG/ML
2 VIAL (ML) INJECTION ONCE
Refills: 0 | Status: COMPLETED | OUTPATIENT
Start: 2023-08-01 | End: 2023-08-01

## 2023-08-01 RX ORDER — SODIUM CHLORIDE 9 MG/ML
500 INJECTION INTRAMUSCULAR; INTRAVENOUS; SUBCUTANEOUS
Refills: 0 | Status: DISCONTINUED | OUTPATIENT
Start: 2023-08-01 | End: 2023-08-03

## 2023-08-01 RX ORDER — ASPIRIN/CALCIUM CARB/MAGNESIUM 324 MG
81 TABLET ORAL DAILY
Refills: 0 | Status: DISCONTINUED | OUTPATIENT
Start: 2023-08-01 | End: 2023-08-01

## 2023-08-01 RX ORDER — HYDROCORTISONE 20 MG
200 TABLET ORAL ONCE
Refills: 0 | Status: COMPLETED | OUTPATIENT
Start: 2023-08-01 | End: 2023-08-01

## 2023-08-01 RX ORDER — FLUCONAZOLE 150 MG/1
200 TABLET ORAL ONCE
Refills: 0 | Status: COMPLETED | OUTPATIENT
Start: 2023-08-01 | End: 2023-08-01

## 2023-08-01 RX ORDER — DIPHENHYDRAMINE HCL 50 MG
50 CAPSULE ORAL ONCE
Refills: 0 | Status: DISCONTINUED | OUTPATIENT
Start: 2023-08-01 | End: 2023-08-03

## 2023-08-01 RX ORDER — ONDANSETRON 8 MG/1
4 TABLET, FILM COATED ORAL ONCE
Refills: 0 | Status: COMPLETED | OUTPATIENT
Start: 2023-08-01 | End: 2023-08-01

## 2023-08-01 RX ORDER — SODIUM CHLORIDE 9 MG/ML
500 INJECTION INTRAMUSCULAR; INTRAVENOUS; SUBCUTANEOUS ONCE
Refills: 0 | Status: COMPLETED | OUTPATIENT
Start: 2023-08-01 | End: 2023-08-01

## 2023-08-01 RX ADMIN — INSULIN GLARGINE 50 UNIT(S): 100 INJECTION, SOLUTION SUBCUTANEOUS at 22:32

## 2023-08-01 RX ADMIN — OXYCODONE HYDROCHLORIDE 5 MILLIGRAM(S): 5 TABLET ORAL at 07:10

## 2023-08-01 RX ADMIN — Medication 25 UNIT(S): at 20:40

## 2023-08-01 RX ADMIN — GABAPENTIN 600 MILLIGRAM(S): 400 CAPSULE ORAL at 05:52

## 2023-08-01 RX ADMIN — Medication 81 MILLIGRAM(S): at 11:41

## 2023-08-01 RX ADMIN — LINEZOLID 600 MILLIGRAM(S): 600 INJECTION, SOLUTION INTRAVENOUS at 05:52

## 2023-08-01 RX ADMIN — GABAPENTIN 600 MILLIGRAM(S): 400 CAPSULE ORAL at 22:32

## 2023-08-01 RX ADMIN — Medication 2: at 09:28

## 2023-08-01 RX ADMIN — LINEZOLID 600 MILLIGRAM(S): 600 INJECTION, SOLUTION INTRAVENOUS at 20:40

## 2023-08-01 RX ADMIN — Medication 3 MILLILITER(S): at 05:51

## 2023-08-01 RX ADMIN — GABAPENTIN 600 MILLIGRAM(S): 400 CAPSULE ORAL at 13:43

## 2023-08-01 RX ADMIN — OXYCODONE HYDROCHLORIDE 10 MILLIGRAM(S): 5 TABLET ORAL at 12:04

## 2023-08-01 RX ADMIN — Medication 3 MILLILITER(S): at 22:33

## 2023-08-01 RX ADMIN — Medication 200 MILLIGRAM(S): at 16:20

## 2023-08-01 RX ADMIN — Medication 10: at 22:31

## 2023-08-01 RX ADMIN — OXYCODONE HYDROCHLORIDE 5 MILLIGRAM(S): 5 TABLET ORAL at 06:06

## 2023-08-01 RX ADMIN — OXYCODONE HYDROCHLORIDE 10 MILLIGRAM(S): 5 TABLET ORAL at 11:40

## 2023-08-01 RX ADMIN — Medication 6: at 13:42

## 2023-08-01 RX ADMIN — OXYCODONE HYDROCHLORIDE 5 MILLIGRAM(S): 5 TABLET ORAL at 22:56

## 2023-08-01 RX ADMIN — SODIUM CHLORIDE 500 MILLILITER(S): 9 INJECTION INTRAMUSCULAR; INTRAVENOUS; SUBCUTANEOUS at 08:49

## 2023-08-01 RX ADMIN — Medication 50 MILLIGRAM(S): at 11:40

## 2023-08-01 RX ADMIN — Medication 1 APPLICATORFUL: at 11:41

## 2023-08-01 RX ADMIN — OXYCODONE HYDROCHLORIDE 10 MILLIGRAM(S): 5 TABLET ORAL at 00:18

## 2023-08-01 RX ADMIN — FLUCONAZOLE 200 MILLIGRAM(S): 150 TABLET ORAL at 13:43

## 2023-08-01 RX ADMIN — SODIUM CHLORIDE 75 MILLILITER(S): 9 INJECTION INTRAMUSCULAR; INTRAVENOUS; SUBCUTANEOUS at 10:47

## 2023-08-01 RX ADMIN — PANTOPRAZOLE SODIUM 40 MILLIGRAM(S): 20 TABLET, DELAYED RELEASE ORAL at 07:12

## 2023-08-01 RX ADMIN — CLOPIDOGREL BISULFATE 75 MILLIGRAM(S): 75 TABLET, FILM COATED ORAL at 11:40

## 2023-08-01 RX ADMIN — Medication 3 MILLILITER(S): at 09:28

## 2023-08-01 RX ADMIN — ONDANSETRON 4 MILLIGRAM(S): 8 TABLET, FILM COATED ORAL at 09:28

## 2023-08-01 RX ADMIN — Medication 4: at 20:40

## 2023-08-01 RX ADMIN — Medication 25 GRAM(S): at 08:50

## 2023-08-01 RX ADMIN — INSULIN GLARGINE 50 UNIT(S): 100 INJECTION, SOLUTION SUBCUTANEOUS at 09:29

## 2023-08-01 RX ADMIN — ATORVASTATIN CALCIUM 80 MILLIGRAM(S): 80 TABLET, FILM COATED ORAL at 22:32

## 2023-08-01 NOTE — PROGRESS NOTE ADULT - PROBLEM SELECTOR PLAN 1
Purulent   Pt reports a few days ago, she noticed a pimple to her left labia, which now has progressed to swelling, erythema, tenderness extending from the L labia to inner thigh. States she initially noted purulent and bloody drainage.   On physical exam, bloody and purulent drainage noted from site, very tender to palpation and erythematous   s/p Ertapenem 1g in the ED  s/p Zosyn from 7/28-7/29  s/p Daptomycin 600mg Q24H and Ertapenem 1g q24h from 7/29-7/30  Wound Cx: Staphylococcus aureus  BCx: NGTD  s/p I&D of the abscess by surgery on 7/30, s/p Dilaudid in pm for pain 0.5mg x1  Plan  - c/w Linezolid 600mg PO Q12H (7/31- )  - c/w Oxycodone 10mg for breakthrough pain and Oxycodone 5mg for severe pain  - Tylenol q6h PRN Q6H for fever  - PT consult Purulent   Pt reports a few days ago, she noticed a pimple to her left labia, which now has progressed to swelling, erythema, tenderness extending from the L labia to inner thigh. States she initially noted purulent and bloody drainage.   On physical exam, bloody and purulent drainage noted from site, very tender to palpation and erythematous   s/p Ertapenem 1g in the ED  s/p Zosyn from 7/28-7/29  s/p Daptomycin 600mg Q24H and Ertapenem 1g q24h from 7/29-7/30  Wound Cx: Staphylococcus aureus  BCx: NGTD  s/p I&D of the abscess by surgery on 7/30, s/p Dilaudid in pm for pain 0.5mg x1  Plan  - c/w Linezolid 600mg PO Q12H (7/31-8/5)  - c/w Oxycodone 10mg for breakthrough pain and Oxycodone 5mg for severe pain  - Tylenol q6h PRN Q6H for fever  - PT consult

## 2023-08-01 NOTE — PHYSICAL THERAPY INITIAL EVALUATION ADULT - IMPAIRMENTS FOUND, PT EVAL
aerobic capacity/endurance/fine motor/gait, locomotion, and balance/gross motor/integumentary integrity/joint integrity and mobility/muscle strength/posture

## 2023-08-01 NOTE — PROGRESS NOTE ADULT - NS ATTEND AMEND GEN_ALL_CORE FT
#L labial abscess, L inguinal abscess, cellulitis, UTI, vaginal candidiasis    Patient c/o L labial and groin pain and vaginal itchiness/discharge.  Exam notable for improved erythema at L groin, ttp, no purulence. 7/30 L labial abscess culture growing MSSA, staph haemolyticus and E. faecium.  7/30 L inguinal abscess grew MSSA.  7/28 L groin abscess grew MSSA.  7/27 UCx grew 100k MSSA and E faecalis.  All BCx ngtd.  Cont linezolid 600mg PO q12h to cover all bacterial. f/u susceptibility of E faecium.  Can given fluconazole 200mg PO once for suspected vaginal candidiasis.      Team 2 will follow you.  Case d/w primary team.    Stephanie Brandon MD, MS  Infectious Disease attending  work cell [phone]   For any questions during evening/weekend/holiday, please page ID on call.

## 2023-08-01 NOTE — PROGRESS NOTE ADULT - PROBLEM SELECTOR PLAN 5
Per previous hospitalization, pt was on Lantus 50u in AM and 50u at bedtime. Also on Lispro 25u before meals  -c/w above as well as mISS

## 2023-08-01 NOTE — PROGRESS NOTE ADULT - SUBJECTIVE AND OBJECTIVE BOX
HOSPITAL COURSE:    OVERNIGHT EVENTS:     SUBJECTIVE:    VITAL SIGNS:  Vital Signs Last 24 Hrs  T(C): 36.5 (31 Jul 2023 20:35), Max: 36.5 (31 Jul 2023 20:35)  T(F): 97.7 (31 Jul 2023 20:35), Max: 97.7 (31 Jul 2023 20:35)  HR: 97 (31 Jul 2023 20:35) (90 - 97)  BP: 139/82 (31 Jul 2023 20:35) (130/67 - 139/82)  BP(mean): --  RR: 18 (31 Jul 2023 20:35) (18 - 18)  SpO2: 100% (31 Jul 2023 20:35) (100% - 100%)    Parameters below as of 31 Jul 2023 20:35  Patient On (Oxygen Delivery Method): room air        PHYSICAL EXAM:  General: NAD; speaking in full sentences  HEENT: NC/AT; PERRL; EOMI; MMM  Neck: supple; no JVD  Cardiac: RRR; +S1/S2  Pulm: CTA B/L; no W/R/R  GI: soft, NT/ND, +BS  Extremities: WWP; no edema, clubbing or cyanosis  Vasc: 2+ radial, DP pulses B/L  Neuro: AAOx3; no focal deficits    MEDICATIONS:  MEDICATIONS  (STANDING):  albuterol/ipratropium for Nebulization 3 milliLiter(s) Nebulizer every 6 hours  atorvastatin 80 milliGRAM(s) Oral at bedtime  clopidogrel Tablet 75 milliGRAM(s) Oral daily  dextrose 5%. 1000 milliLiter(s) (50 mL/Hr) IV Continuous <Continuous>  dextrose 5%. 1000 milliLiter(s) (100 mL/Hr) IV Continuous <Continuous>  dextrose 50% Injectable 25 Gram(s) IV Push once  dextrose 50% Injectable 12.5 Gram(s) IV Push once  dextrose 50% Injectable 25 Gram(s) IV Push once  gabapentin 600 milliGRAM(s) Oral every 8 hours  glucagon  Injectable 1 milliGRAM(s) IntraMuscular once  insulin glargine Injectable (LANTUS) 50 Unit(s) SubCutaneous every morning  insulin glargine Injectable (LANTUS) 50 Unit(s) SubCutaneous at bedtime  insulin lispro (ADMELOG) corrective regimen sliding scale   SubCutaneous Before meals and at bedtime  insulin lispro Injectable (ADMELOG) 25 Unit(s) SubCutaneous three times a day before meals  linezolid    Tablet 600 milliGRAM(s) Oral every 12 hours  metoprolol succinate ER 50 milliGRAM(s) Oral every 24 hours  pantoprazole    Tablet 40 milliGRAM(s) Oral before breakfast    MEDICATIONS  (PRN):  acetaminophen     Tablet .. 650 milliGRAM(s) Oral every 6 hours PRN Temp greater or equal to 38C (100.4F), Mild Pain (1 - 3)  dextrose Oral Gel 15 Gram(s) Oral once PRN Blood Glucose LESS THAN 70 milliGRAM(s)/deciliter  oxyCODONE    IR 5 milliGRAM(s) Oral every 6 hours PRN Severe Pain (7 - 10)  oxyCODONE    IR 10 milliGRAM(s) Oral every 12 hours PRN breakthrough pain      ALLERGIES:  Allergies    vancomycin (Anaphylaxis; Hives)  shellfish. (Hives; Anaphylaxis)  iodine containing compounds (Hives; Anaphylaxis)  clindamycin (Pruritus)  amoxicillin (Short breath; Rash)  penicillin (Hives)  fish (Hives; Urticaria)    Intolerances    Bactrim I.V. (Rash (Mod to Severe))      LABS:                        11.3   7.63  )-----------( 393      ( 31 Jul 2023 05:30 )             37.6     07-31    138  |  99  |  10  ----------------------------<  230<H>  3.4<L>   |  26  |  0.74    Ca    8.2<L>      31 Jul 2023 05:30  Phos  4.1     07-31  Mg     1.8     07-31    TPro  6.5  /  Alb  3.1<L>  /  TBili  <0.2  /  DBili  x   /  AST  13  /  ALT  10  /  AlkPhos  149<H>  07-31    PTT - ( 30 Jul 2023 09:15 )  PTT:31.0 sec    RADIOLOGY & ADDITIONAL TESTS: Reviewed. HOSPITAL COURSE:  42 y/o female with PMHx Asthma, CVA (11/2021; residual L>R weakness), PE (4/2023 on Eliquis), uncontrolled DM-2 (on insulin), HTN, HLD, hx abdominal necrotizing fasciitis ( ostomy placement in 11/2021 with intubation from 11-12/2021), hx of frequent UTIs 2/2 to suprapubic catheter, most recent UTI c/b Urosepsis and discharged with midline for Ertapenem, recent cardiac stents now presenting due to a new pimple in the left inguinal area near her left labia and dysuria. In the ED, the pt was given Ertapenem x1     OVERNIGHT EVENTS:     SUBJECTIVE:    VITAL SIGNS:  Vital Signs Last 24 Hrs  T(C): 36.5 (31 Jul 2023 20:35), Max: 36.5 (31 Jul 2023 20:35)  T(F): 97.7 (31 Jul 2023 20:35), Max: 97.7 (31 Jul 2023 20:35)  HR: 97 (31 Jul 2023 20:35) (90 - 97)  BP: 139/82 (31 Jul 2023 20:35) (130/67 - 139/82)  BP(mean): --  RR: 18 (31 Jul 2023 20:35) (18 - 18)  SpO2: 100% (31 Jul 2023 20:35) (100% - 100%)    Parameters below as of 31 Jul 2023 20:35  Patient On (Oxygen Delivery Method): room air        PHYSICAL EXAM:  General: NAD; speaking in full sentences  HEENT: NC/AT; PERRL; EOMI; MMM  Neck: supple; no JVD  Cardiac: RRR; +S1/S2  Pulm: CTA B/L; no W/R/R  GI: soft, NT/ND, +BS  Extremities: WWP; no edema, clubbing or cyanosis  Vasc: 2+ radial, DP pulses B/L  Neuro: AAOx3; no focal deficits    MEDICATIONS:  MEDICATIONS  (STANDING):  albuterol/ipratropium for Nebulization 3 milliLiter(s) Nebulizer every 6 hours  atorvastatin 80 milliGRAM(s) Oral at bedtime  clopidogrel Tablet 75 milliGRAM(s) Oral daily  dextrose 5%. 1000 milliLiter(s) (50 mL/Hr) IV Continuous <Continuous>  dextrose 5%. 1000 milliLiter(s) (100 mL/Hr) IV Continuous <Continuous>  dextrose 50% Injectable 25 Gram(s) IV Push once  dextrose 50% Injectable 12.5 Gram(s) IV Push once  dextrose 50% Injectable 25 Gram(s) IV Push once  gabapentin 600 milliGRAM(s) Oral every 8 hours  glucagon  Injectable 1 milliGRAM(s) IntraMuscular once  insulin glargine Injectable (LANTUS) 50 Unit(s) SubCutaneous every morning  insulin glargine Injectable (LANTUS) 50 Unit(s) SubCutaneous at bedtime  insulin lispro (ADMELOG) corrective regimen sliding scale   SubCutaneous Before meals and at bedtime  insulin lispro Injectable (ADMELOG) 25 Unit(s) SubCutaneous three times a day before meals  linezolid    Tablet 600 milliGRAM(s) Oral every 12 hours  metoprolol succinate ER 50 milliGRAM(s) Oral every 24 hours  pantoprazole    Tablet 40 milliGRAM(s) Oral before breakfast    MEDICATIONS  (PRN):  acetaminophen     Tablet .. 650 milliGRAM(s) Oral every 6 hours PRN Temp greater or equal to 38C (100.4F), Mild Pain (1 - 3)  dextrose Oral Gel 15 Gram(s) Oral once PRN Blood Glucose LESS THAN 70 milliGRAM(s)/deciliter  oxyCODONE    IR 5 milliGRAM(s) Oral every 6 hours PRN Severe Pain (7 - 10)  oxyCODONE    IR 10 milliGRAM(s) Oral every 12 hours PRN breakthrough pain      ALLERGIES:  Allergies    vancomycin (Anaphylaxis; Hives)  shellfish. (Hives; Anaphylaxis)  iodine containing compounds (Hives; Anaphylaxis)  clindamycin (Pruritus)  amoxicillin (Short breath; Rash)  penicillin (Hives)  fish (Hives; Urticaria)    Intolerances    Bactrim I.V. (Rash (Mod to Severe))      LABS:                        11.3   7.63  )-----------( 393      ( 31 Jul 2023 05:30 )             37.6     07-31    138  |  99  |  10  ----------------------------<  230<H>  3.4<L>   |  26  |  0.74    Ca    8.2<L>      31 Jul 2023 05:30  Phos  4.1     07-31  Mg     1.8     07-31    TPro  6.5  /  Alb  3.1<L>  /  TBili  <0.2  /  DBili  x   /  AST  13  /  ALT  10  /  AlkPhos  149<H>  07-31    PTT - ( 30 Jul 2023 09:15 )  PTT:31.0 sec    RADIOLOGY & ADDITIONAL TESTS: Reviewed. HOSPITAL COURSE:  42 y/o female with PMHx Asthma, CVA (11/2021; residual L>R weakness), PE (4/2023 on Eliquis), uncontrolled DM-2 (on insulin), HTN, HLD, hx abdominal necrotizing fasciitis ( ostomy placement in 11/2021 with intubation from 11-12/2021), hx of frequent UTIs 2/2 to suprapubic catheter, most recent UTI c/b Urosepsis and discharged with midline for Ertapenem, recent cardiac stents now presenting due to a new pimple in the left inguinal area near her left labia and dysuria. In the ED, the pt was given Ertapenem x1. During examination, the pt was found to have purulent cellulitis for which she we started the pt on Zosyn initially. Wound cultures initially were concerning for possible MRSA and the antibiotics were changed to Daptomuycin as the pt has an allergy to Vancomycin. Final cultures with sensitivities revealed MSSA and the pt was started on Linezolid 600 PO per ID recommendations. For the pt's dysuria, she was initially on Ertapenem given the history of     OVERNIGHT EVENTS:     SUBJECTIVE:    VITAL SIGNS:  Vital Signs Last 24 Hrs  T(C): 36.5 (31 Jul 2023 20:35), Max: 36.5 (31 Jul 2023 20:35)  T(F): 97.7 (31 Jul 2023 20:35), Max: 97.7 (31 Jul 2023 20:35)  HR: 97 (31 Jul 2023 20:35) (90 - 97)  BP: 139/82 (31 Jul 2023 20:35) (130/67 - 139/82)  BP(mean): --  RR: 18 (31 Jul 2023 20:35) (18 - 18)  SpO2: 100% (31 Jul 2023 20:35) (100% - 100%)    Parameters below as of 31 Jul 2023 20:35  Patient On (Oxygen Delivery Method): room air        PHYSICAL EXAM:  General: NAD; speaking in full sentences  HEENT: NC/AT; PERRL; EOMI; MMM  Neck: supple; no JVD  Cardiac: RRR; +S1/S2  Pulm: CTA B/L; no W/R/R  GI: soft, NT/ND, +BS  Extremities: WWP; no edema, clubbing or cyanosis  Vasc: 2+ radial, DP pulses B/L  Neuro: AAOx3; no focal deficits    MEDICATIONS:  MEDICATIONS  (STANDING):  albuterol/ipratropium for Nebulization 3 milliLiter(s) Nebulizer every 6 hours  atorvastatin 80 milliGRAM(s) Oral at bedtime  clopidogrel Tablet 75 milliGRAM(s) Oral daily  dextrose 5%. 1000 milliLiter(s) (50 mL/Hr) IV Continuous <Continuous>  dextrose 5%. 1000 milliLiter(s) (100 mL/Hr) IV Continuous <Continuous>  dextrose 50% Injectable 25 Gram(s) IV Push once  dextrose 50% Injectable 12.5 Gram(s) IV Push once  dextrose 50% Injectable 25 Gram(s) IV Push once  gabapentin 600 milliGRAM(s) Oral every 8 hours  glucagon  Injectable 1 milliGRAM(s) IntraMuscular once  insulin glargine Injectable (LANTUS) 50 Unit(s) SubCutaneous every morning  insulin glargine Injectable (LANTUS) 50 Unit(s) SubCutaneous at bedtime  insulin lispro (ADMELOG) corrective regimen sliding scale   SubCutaneous Before meals and at bedtime  insulin lispro Injectable (ADMELOG) 25 Unit(s) SubCutaneous three times a day before meals  linezolid    Tablet 600 milliGRAM(s) Oral every 12 hours  metoprolol succinate ER 50 milliGRAM(s) Oral every 24 hours  pantoprazole    Tablet 40 milliGRAM(s) Oral before breakfast    MEDICATIONS  (PRN):  acetaminophen     Tablet .. 650 milliGRAM(s) Oral every 6 hours PRN Temp greater or equal to 38C (100.4F), Mild Pain (1 - 3)  dextrose Oral Gel 15 Gram(s) Oral once PRN Blood Glucose LESS THAN 70 milliGRAM(s)/deciliter  oxyCODONE    IR 5 milliGRAM(s) Oral every 6 hours PRN Severe Pain (7 - 10)  oxyCODONE    IR 10 milliGRAM(s) Oral every 12 hours PRN breakthrough pain      ALLERGIES:  Allergies    vancomycin (Anaphylaxis; Hives)  shellfish. (Hives; Anaphylaxis)  iodine containing compounds (Hives; Anaphylaxis)  clindamycin (Pruritus)  amoxicillin (Short breath; Rash)  penicillin (Hives)  fish (Hives; Urticaria)    Intolerances    Bactrim I.V. (Rash (Mod to Severe))      LABS:                        11.3   7.63  )-----------( 393      ( 31 Jul 2023 05:30 )             37.6     07-31    138  |  99  |  10  ----------------------------<  230<H>  3.4<L>   |  26  |  0.74    Ca    8.2<L>      31 Jul 2023 05:30  Phos  4.1     07-31  Mg     1.8     07-31    TPro  6.5  /  Alb  3.1<L>  /  TBili  <0.2  /  DBili  x   /  AST  13  /  ALT  10  /  AlkPhos  149<H>  07-31    PTT - ( 30 Jul 2023 09:15 )  PTT:31.0 sec    RADIOLOGY & ADDITIONAL TESTS: Reviewed. HOSPITAL COURSE:  42 y/o female with PMHx Asthma, CVA (11/2021; residual L>R weakness), PE (4/2023 on Eliquis), uncontrolled DM-2 (on insulin), HTN, HLD, hx abdominal necrotizing fasciitis ( ostomy placement in 11/2021 with intubation from 11-12/2021), hx of frequent UTIs 2/2 to suprapubic catheter, most recent UTI c/b Urosepsis and discharged with midline for Ertapenem, recent cardiac stents now presenting due to a new pimple in the left inguinal area near her left labia and dysuria. In the ED, the pt was given Ertapenem x1. During examination, the pt was found to have purulent cellulitis for which she we started the pt on Zosyn initially. Wound cultures initially were concerning for possible MRSA and the antibiotics were changed to Daptomuycin as the pt has an allergy to Vancomycin. Final cultures with sensitivities revealed MSSA and the pt was started on Linezolid 600 PO per ID recommendations. She is now s/p I&D by surgery on 7/30.  For the pt's dysuria, she was initially on Ertapenem given the history of Urosepsis with Klebsiella, was then given 1 dose of Macrobid on 7/31 and d/c due to the pt not having symptoms anymore. The pt was also complaining of pain around her ostomy bag for which surgery was consulted and decided no interventions at this time. The pt was also complaining of chest pain and pressure which has been happening from prior to arrival. She states that she recently had stents placed and was supposed to follow up in 1 week to have     OVERNIGHT EVENTS:     SUBJECTIVE:    VITAL SIGNS:  Vital Signs Last 24 Hrs  T(C): 36.5 (31 Jul 2023 20:35), Max: 36.5 (31 Jul 2023 20:35)  T(F): 97.7 (31 Jul 2023 20:35), Max: 97.7 (31 Jul 2023 20:35)  HR: 97 (31 Jul 2023 20:35) (90 - 97)  BP: 139/82 (31 Jul 2023 20:35) (130/67 - 139/82)  BP(mean): --  RR: 18 (31 Jul 2023 20:35) (18 - 18)  SpO2: 100% (31 Jul 2023 20:35) (100% - 100%)    Parameters below as of 31 Jul 2023 20:35  Patient On (Oxygen Delivery Method): room air        PHYSICAL EXAM:  General: NAD; speaking in full sentences  HEENT: NC/AT; PERRL; EOMI; MMM  Neck: supple; no JVD  Cardiac: RRR; +S1/S2  Pulm: CTA B/L; no W/R/R  GI: soft, NT/ND, +BS  Extremities: WWP; no edema, clubbing or cyanosis  Vasc: 2+ radial, DP pulses B/L  Neuro: AAOx3; no focal deficits    MEDICATIONS:  MEDICATIONS  (STANDING):  albuterol/ipratropium for Nebulization 3 milliLiter(s) Nebulizer every 6 hours  atorvastatin 80 milliGRAM(s) Oral at bedtime  clopidogrel Tablet 75 milliGRAM(s) Oral daily  dextrose 5%. 1000 milliLiter(s) (50 mL/Hr) IV Continuous <Continuous>  dextrose 5%. 1000 milliLiter(s) (100 mL/Hr) IV Continuous <Continuous>  dextrose 50% Injectable 25 Gram(s) IV Push once  dextrose 50% Injectable 12.5 Gram(s) IV Push once  dextrose 50% Injectable 25 Gram(s) IV Push once  gabapentin 600 milliGRAM(s) Oral every 8 hours  glucagon  Injectable 1 milliGRAM(s) IntraMuscular once  insulin glargine Injectable (LANTUS) 50 Unit(s) SubCutaneous every morning  insulin glargine Injectable (LANTUS) 50 Unit(s) SubCutaneous at bedtime  insulin lispro (ADMELOG) corrective regimen sliding scale   SubCutaneous Before meals and at bedtime  insulin lispro Injectable (ADMELOG) 25 Unit(s) SubCutaneous three times a day before meals  linezolid    Tablet 600 milliGRAM(s) Oral every 12 hours  metoprolol succinate ER 50 milliGRAM(s) Oral every 24 hours  pantoprazole    Tablet 40 milliGRAM(s) Oral before breakfast    MEDICATIONS  (PRN):  acetaminophen     Tablet .. 650 milliGRAM(s) Oral every 6 hours PRN Temp greater or equal to 38C (100.4F), Mild Pain (1 - 3)  dextrose Oral Gel 15 Gram(s) Oral once PRN Blood Glucose LESS THAN 70 milliGRAM(s)/deciliter  oxyCODONE    IR 5 milliGRAM(s) Oral every 6 hours PRN Severe Pain (7 - 10)  oxyCODONE    IR 10 milliGRAM(s) Oral every 12 hours PRN breakthrough pain      ALLERGIES:  Allergies    vancomycin (Anaphylaxis; Hives)  shellfish. (Hives; Anaphylaxis)  iodine containing compounds (Hives; Anaphylaxis)  clindamycin (Pruritus)  amoxicillin (Short breath; Rash)  penicillin (Hives)  fish (Hives; Urticaria)    Intolerances    Bactrim I.V. (Rash (Mod to Severe))      LABS:                        11.3   7.63  )-----------( 393      ( 31 Jul 2023 05:30 )             37.6     07-31    138  |  99  |  10  ----------------------------<  230<H>  3.4<L>   |  26  |  0.74    Ca    8.2<L>      31 Jul 2023 05:30  Phos  4.1     07-31  Mg     1.8     07-31    TPro  6.5  /  Alb  3.1<L>  /  TBili  <0.2  /  DBili  x   /  AST  13  /  ALT  10  /  AlkPhos  149<H>  07-31    PTT - ( 30 Jul 2023 09:15 )  PTT:31.0 sec    RADIOLOGY & ADDITIONAL TESTS: Reviewed. HOSPITAL COURSE:  42 y/o female with PMHx Asthma, CVA (11/2021; residual L>R weakness), PE (4/2023 on Eliquis), uncontrolled DM-2 (on insulin), HTN, HLD, hx abdominal necrotizing fasciitis ( ostomy placement in 11/2021 with intubation from 11-12/2021), hx of frequent UTIs 2/2 to suprapubic catheter, most recent UTI c/b Urosepsis and discharged with midline for Ertapenem, recent cardiac stents now presenting due to a new pimple in the left inguinal area near her left labia and dysuria. In the ED, the pt was given Ertapenem x1. During examination, the pt was found to have purulent cellulitis for which she we started the pt on Zosyn initially. Wound cultures initially were concerning for possible MRSA and the antibiotics were changed to Daptomuycin as the pt has an allergy to Vancomycin. Final cultures with sensitivities revealed MSSA and the pt was started on Linezolid 600 PO per ID recommendations. She is now s/p I&D by surgery on 7/30.  For the pt's dysuria, she was initially on Ertapenem given the history of Urosepsis with Klebsiella, was then given 1 dose of Macrobid on 7/31 and d/c due to the pt not having symptoms anymore. The pt was also complaining of pain around her ostomy bag for which surgery was consulted and decided no interventions at this time. The pt was also complaining of chest pain and pressure which has been happening from prior to arrival. She states that she recently had stents placed on 06/26 and was supposed to follow up in 1 week to have intervention for a 70% dLCx/OM1. ECG and troponin were WNL this admission. Cardiology has scheduled the pt for the PCI procedure on 8/1.     OVERNIGHT EVENTS: EDIL    SUBJECTIVE:  The    VITAL SIGNS:  Vital Signs Last 24 Hrs  T(C): 36.5 (31 Jul 2023 20:35), Max: 36.5 (31 Jul 2023 20:35)  T(F): 97.7 (31 Jul 2023 20:35), Max: 97.7 (31 Jul 2023 20:35)  HR: 97 (31 Jul 2023 20:35) (90 - 97)  BP: 139/82 (31 Jul 2023 20:35) (130/67 - 139/82)  BP(mean): --  RR: 18 (31 Jul 2023 20:35) (18 - 18)  SpO2: 100% (31 Jul 2023 20:35) (100% - 100%)    Parameters below as of 31 Jul 2023 20:35  Patient On (Oxygen Delivery Method): room air        PHYSICAL EXAM:  General: NAD; speaking in full sentences  HEENT: NC/AT; PERRL; EOMI; MMM  Neck: supple; no JVD  Cardiac: RRR; +S1/S2  Pulm: CTA B/L; no W/R/R  GI: soft, NT/ND, +BS  Extremities: WWP; no edema, clubbing or cyanosis  Vasc: 2+ radial, DP pulses B/L  Neuro: AAOx3; no focal deficits    MEDICATIONS:  MEDICATIONS  (STANDING):  albuterol/ipratropium for Nebulization 3 milliLiter(s) Nebulizer every 6 hours  atorvastatin 80 milliGRAM(s) Oral at bedtime  clopidogrel Tablet 75 milliGRAM(s) Oral daily  dextrose 5%. 1000 milliLiter(s) (50 mL/Hr) IV Continuous <Continuous>  dextrose 5%. 1000 milliLiter(s) (100 mL/Hr) IV Continuous <Continuous>  dextrose 50% Injectable 25 Gram(s) IV Push once  dextrose 50% Injectable 12.5 Gram(s) IV Push once  dextrose 50% Injectable 25 Gram(s) IV Push once  gabapentin 600 milliGRAM(s) Oral every 8 hours  glucagon  Injectable 1 milliGRAM(s) IntraMuscular once  insulin glargine Injectable (LANTUS) 50 Unit(s) SubCutaneous every morning  insulin glargine Injectable (LANTUS) 50 Unit(s) SubCutaneous at bedtime  insulin lispro (ADMELOG) corrective regimen sliding scale   SubCutaneous Before meals and at bedtime  insulin lispro Injectable (ADMELOG) 25 Unit(s) SubCutaneous three times a day before meals  linezolid    Tablet 600 milliGRAM(s) Oral every 12 hours  metoprolol succinate ER 50 milliGRAM(s) Oral every 24 hours  pantoprazole    Tablet 40 milliGRAM(s) Oral before breakfast    MEDICATIONS  (PRN):  acetaminophen     Tablet .. 650 milliGRAM(s) Oral every 6 hours PRN Temp greater or equal to 38C (100.4F), Mild Pain (1 - 3)  dextrose Oral Gel 15 Gram(s) Oral once PRN Blood Glucose LESS THAN 70 milliGRAM(s)/deciliter  oxyCODONE    IR 5 milliGRAM(s) Oral every 6 hours PRN Severe Pain (7 - 10)  oxyCODONE    IR 10 milliGRAM(s) Oral every 12 hours PRN breakthrough pain      ALLERGIES:  Allergies    vancomycin (Anaphylaxis; Hives)  shellfish. (Hives; Anaphylaxis)  iodine containing compounds (Hives; Anaphylaxis)  clindamycin (Pruritus)  amoxicillin (Short breath; Rash)  penicillin (Hives)  fish (Hives; Urticaria)    Intolerances    Bactrim I.V. (Rash (Mod to Severe))      LABS:                        11.3   7.63  )-----------( 393      ( 31 Jul 2023 05:30 )             37.6     07-31    138  |  99  |  10  ----------------------------<  230<H>  3.4<L>   |  26  |  0.74    Ca    8.2<L>      31 Jul 2023 05:30  Phos  4.1     07-31  Mg     1.8     07-31    TPro  6.5  /  Alb  3.1<L>  /  TBili  <0.2  /  DBili  x   /  AST  13  /  ALT  10  /  AlkPhos  149<H>  07-31    PTT - ( 30 Jul 2023 09:15 )  PTT:31.0 sec    RADIOLOGY & ADDITIONAL TESTS: Reviewed. HOSPITAL COURSE:  42 y/o female with PMHx Asthma, CVA (11/2021; residual L>R weakness), PE (4/2023 on Eliquis), uncontrolled DM-2 (on insulin), HTN, HLD, hx abdominal necrotizing fasciitis ( ostomy placement in 11/2021 with intubation from 11-12/2021), hx of frequent UTIs 2/2 to suprapubic catheter, most recent UTI c/b Urosepsis and discharged with midline for Ertapenem, recent cardiac stents now presenting due to a new pimple in the left inguinal area near her left labia and dysuria. In the ED, the pt was given Ertapenem x1. During examination, the pt was found to have purulent cellulitis for which she we started the pt on Zosyn initially. Wound cultures initially were concerning for possible MRSA and the antibiotics were changed to Daptomuycin as the pt has an allergy to Vancomycin. Final cultures with sensitivities revealed MSSA and the pt was started on Linezolid 600 PO per ID recommendations. She is now s/p I&D by surgery on 7/30.  For the pt's dysuria, she was initially on Ertapenem given the history of Urosepsis with Klebsiella, was then given 1 dose of Macrobid on 7/31 and d/c due to the pt not having symptoms anymore. The pt was also complaining of pain around her ostomy bag for which surgery was consulted and decided no interventions at this time. The pt was also complaining of chest pain and pressure which has been happening from prior to arrival. She states that she recently had stents placed on 06/26 and was supposed to follow up in 1 week to have intervention for a 70% dLCx/OM1. ECG and troponin were WNL this admission. Cardiology has scheduled the pt for the PCI procedure on 8/1.     OVERNIGHT EVENTS: EDIL    SUBJECTIVE:  The pt was seen and examined at the bedside this AM.     VITAL SIGNS:  Vital Signs Last 24 Hrs  T(C): 36.5 (31 Jul 2023 20:35), Max: 36.5 (31 Jul 2023 20:35)  T(F): 97.7 (31 Jul 2023 20:35), Max: 97.7 (31 Jul 2023 20:35)  HR: 97 (31 Jul 2023 20:35) (90 - 97)  BP: 139/82 (31 Jul 2023 20:35) (130/67 - 139/82)  BP(mean): --  RR: 18 (31 Jul 2023 20:35) (18 - 18)  SpO2: 100% (31 Jul 2023 20:35) (100% - 100%)    Parameters below as of 31 Jul 2023 20:35  Patient On (Oxygen Delivery Method): room air        PHYSICAL EXAM:  General: NAD; speaking in full sentences  HEENT: NC/AT; PERRL; EOMI; MMM  Neck: supple; no JVD  Cardiac: RRR; +S1/S2  Pulm: CTA B/L; no W/R/R  GI: soft, NT/ND, +BS  Extremities: WWP; no edema, clubbing or cyanosis  Vasc: 2+ radial, DP pulses B/L  Neuro: AAOx3; no focal deficits    MEDICATIONS:  MEDICATIONS  (STANDING):  albuterol/ipratropium for Nebulization 3 milliLiter(s) Nebulizer every 6 hours  atorvastatin 80 milliGRAM(s) Oral at bedtime  clopidogrel Tablet 75 milliGRAM(s) Oral daily  dextrose 5%. 1000 milliLiter(s) (50 mL/Hr) IV Continuous <Continuous>  dextrose 5%. 1000 milliLiter(s) (100 mL/Hr) IV Continuous <Continuous>  dextrose 50% Injectable 25 Gram(s) IV Push once  dextrose 50% Injectable 12.5 Gram(s) IV Push once  dextrose 50% Injectable 25 Gram(s) IV Push once  gabapentin 600 milliGRAM(s) Oral every 8 hours  glucagon  Injectable 1 milliGRAM(s) IntraMuscular once  insulin glargine Injectable (LANTUS) 50 Unit(s) SubCutaneous every morning  insulin glargine Injectable (LANTUS) 50 Unit(s) SubCutaneous at bedtime  insulin lispro (ADMELOG) corrective regimen sliding scale   SubCutaneous Before meals and at bedtime  insulin lispro Injectable (ADMELOG) 25 Unit(s) SubCutaneous three times a day before meals  linezolid    Tablet 600 milliGRAM(s) Oral every 12 hours  metoprolol succinate ER 50 milliGRAM(s) Oral every 24 hours  pantoprazole    Tablet 40 milliGRAM(s) Oral before breakfast    MEDICATIONS  (PRN):  acetaminophen     Tablet .. 650 milliGRAM(s) Oral every 6 hours PRN Temp greater or equal to 38C (100.4F), Mild Pain (1 - 3)  dextrose Oral Gel 15 Gram(s) Oral once PRN Blood Glucose LESS THAN 70 milliGRAM(s)/deciliter  oxyCODONE    IR 5 milliGRAM(s) Oral every 6 hours PRN Severe Pain (7 - 10)  oxyCODONE    IR 10 milliGRAM(s) Oral every 12 hours PRN breakthrough pain      ALLERGIES:  Allergies    vancomycin (Anaphylaxis; Hives)  shellfish. (Hives; Anaphylaxis)  iodine containing compounds (Hives; Anaphylaxis)  clindamycin (Pruritus)  amoxicillin (Short breath; Rash)  penicillin (Hives)  fish (Hives; Urticaria)    Intolerances    Bactrim I.V. (Rash (Mod to Severe))      LABS:                        11.3   7.63  )-----------( 393      ( 31 Jul 2023 05:30 )             37.6     07-31    138  |  99  |  10  ----------------------------<  230<H>  3.4<L>   |  26  |  0.74    Ca    8.2<L>      31 Jul 2023 05:30  Phos  4.1     07-31  Mg     1.8     07-31    TPro  6.5  /  Alb  3.1<L>  /  TBili  <0.2  /  DBili  x   /  AST  13  /  ALT  10  /  AlkPhos  149<H>  07-31    PTT - ( 30 Jul 2023 09:15 )  PTT:31.0 sec    RADIOLOGY & ADDITIONAL TESTS: Reviewed. HOSPITAL COURSE:  44 y/o female with PMHx Asthma, CVA (11/2021; residual L>R weakness), PE (4/2023 on Eliquis), uncontrolled DM-2 (on insulin), HTN, HLD, hx abdominal necrotizing fasciitis ( ostomy placement in 11/2021 with intubation from 11-12/2021), hx of frequent UTIs 2/2 to suprapubic catheter, most recent UTI c/b Urosepsis and discharged with midline for Ertapenem, recent cardiac stents now presenting due to a new pimple in the left inguinal area near her left labia and dysuria. In the ED, the pt was given Ertapenem x1. During examination, the pt was found to have purulent cellulitis for which she we started the pt on Zosyn initially. Wound cultures initially were concerning for possible MRSA and the antibiotics were changed to Daptomuycin as the pt has an allergy to Vancomycin. Final cultures with sensitivities revealed MSSA and the pt was started on Linezolid 600 PO per ID recommendations. She is now s/p I&D by surgery on 7/30.  For the pt's dysuria, she was initially on Ertapenem given the history of Urosepsis with Klebsiella, was then given 1 dose of Macrobid on 7/31 and d/c due to the pt not having symptoms anymore. The pt was also complaining of pain around her ostomy bag for which surgery was consulted and decided no interventions at this time. The pt was also complaining of chest pain and pressure which has been happening from prior to arrival. She states that she recently had stents placed on 06/26 and was supposed to follow up in 1 week to have intervention for a 70% dLCx/OM1. ECG and troponin were WNL this admission. Cardiology has scheduled the pt for the PCI procedure on 8/1.     OVERNIGHT EVENTS: EDIL    SUBJECTIVE:  The pt was seen and examined at the bedside this AM. The pt states that she continues to have chest pain and pressure which is unchanged     VITAL SIGNS:  Vital Signs Last 24 Hrs  T(C): 36.5 (31 Jul 2023 20:35), Max: 36.5 (31 Jul 2023 20:35)  T(F): 97.7 (31 Jul 2023 20:35), Max: 97.7 (31 Jul 2023 20:35)  HR: 97 (31 Jul 2023 20:35) (90 - 97)  BP: 139/82 (31 Jul 2023 20:35) (130/67 - 139/82)  BP(mean): --  RR: 18 (31 Jul 2023 20:35) (18 - 18)  SpO2: 100% (31 Jul 2023 20:35) (100% - 100%)    Parameters below as of 31 Jul 2023 20:35  Patient On (Oxygen Delivery Method): room air        PHYSICAL EXAM:  General: Anxious appearing, speaking in full sentences, cooperating with exam and answering questions  HEENT: Moist mucous membranes   Neck: supple; no JVD  Cardiac: RRR; +S1/S2, no murmurs or gallops   Pulm: symmetrical bilateral chest rises, CTA B/L; no W/R/R  GI: soft, NT/ND, +BS, ostomy bag c/d/i with formed stool in bag  Extremities: No edema, clubbing or cyanosis  Vasc: 2+ radial, DP pulses B/L  Neuro: AAOx3; no focal deficits  Skin: Wound packing is c/d/i, swelling and redness is reduced and area is  to palpation. Pt guarded to the area due to pain and limited physical examination    MEDICATIONS:  MEDICATIONS  (STANDING):  albuterol/ipratropium for Nebulization 3 milliLiter(s) Nebulizer every 6 hours  atorvastatin 80 milliGRAM(s) Oral at bedtime  clopidogrel Tablet 75 milliGRAM(s) Oral daily  dextrose 5%. 1000 milliLiter(s) (50 mL/Hr) IV Continuous <Continuous>  dextrose 5%. 1000 milliLiter(s) (100 mL/Hr) IV Continuous <Continuous>  dextrose 50% Injectable 25 Gram(s) IV Push once  dextrose 50% Injectable 12.5 Gram(s) IV Push once  dextrose 50% Injectable 25 Gram(s) IV Push once  gabapentin 600 milliGRAM(s) Oral every 8 hours  glucagon  Injectable 1 milliGRAM(s) IntraMuscular once  insulin glargine Injectable (LANTUS) 50 Unit(s) SubCutaneous every morning  insulin glargine Injectable (LANTUS) 50 Unit(s) SubCutaneous at bedtime  insulin lispro (ADMELOG) corrective regimen sliding scale   SubCutaneous Before meals and at bedtime  insulin lispro Injectable (ADMELOG) 25 Unit(s) SubCutaneous three times a day before meals  linezolid    Tablet 600 milliGRAM(s) Oral every 12 hours  metoprolol succinate ER 50 milliGRAM(s) Oral every 24 hours  pantoprazole    Tablet 40 milliGRAM(s) Oral before breakfast    MEDICATIONS  (PRN):  acetaminophen     Tablet .. 650 milliGRAM(s) Oral every 6 hours PRN Temp greater or equal to 38C (100.4F), Mild Pain (1 - 3)  dextrose Oral Gel 15 Gram(s) Oral once PRN Blood Glucose LESS THAN 70 milliGRAM(s)/deciliter  oxyCODONE    IR 5 milliGRAM(s) Oral every 6 hours PRN Severe Pain (7 - 10)  oxyCODONE    IR 10 milliGRAM(s) Oral every 12 hours PRN breakthrough pain      ALLERGIES:  Allergies    vancomycin (Anaphylaxis; Hives)  shellfish. (Hives; Anaphylaxis)  iodine containing compounds (Hives; Anaphylaxis)  clindamycin (Pruritus)  amoxicillin (Short breath; Rash)  penicillin (Hives)  fish (Hives; Urticaria)    Intolerances    Bactrim I.V. (Rash (Mod to Severe))      LABS:                        11.3   7.63  )-----------( 393      ( 31 Jul 2023 05:30 )             37.6     07-31    138  |  99  |  10  ----------------------------<  230<H>  3.4<L>   |  26  |  0.74    Ca    8.2<L>      31 Jul 2023 05:30  Phos  4.1     07-31  Mg     1.8     07-31    TPro  6.5  /  Alb  3.1<L>  /  TBili  <0.2  /  DBili  x   /  AST  13  /  ALT  10  /  AlkPhos  149<H>  07-31    PTT - ( 30 Jul 2023 09:15 )  PTT:31.0 sec    RADIOLOGY & ADDITIONAL TESTS: Reviewed.

## 2023-08-01 NOTE — PROGRESS NOTE ADULT - PROBLEM SELECTOR PLAN 6
Per previous hospitalization, pt was on Lantus 50u in AM and 50u at bedtime. Also on Lispro 25u before meals  Glucose >300 on arrival  Glucose at 200s now   On ISS  Plan:  -c/w above as well as mISS Pt complaining of vaginal itching and grey discharge from 7/31   The pt initially thought it was an allergic reaction to the new antibiotics, but realized it did not improve after abx switch  The pt has a hx of recurrent infections and has been on multiple antibiotics   Plan:  - Will give Fluconazole 200mg PO x1   - Monitor

## 2023-08-01 NOTE — PROGRESS NOTE ADULT - ASSESSMENT
44 y/o female with PMHx Asthma, CVA (11/2021; residual L>R weakness), PE (4/2023 on Eliquis), uncontrolled DM-2 (on insulin), HTN, HLD, hx abdominal necrotizing fasciitis ( ostomy placement in 11/2021 with intubation from 11-12/2021), hx of frequent UTIs 2/2 to suprapubic catheter, most recent UTI c/b Urosepsis and discharged with midline for Ertapenem, recent cardiac stents now presenting due to generally not feeling well and concern for recurrent UTI, admitted for management of cellulitis. Patient is s/p I & D with sx team for abscess. ID following for abx management. Patient stable from abscess and bacteruria standpoint. Patient now s/p staged PCI 8/1/23, being stepped down to cardiology/telemetry for further management.  42 y/o female with PMHx Asthma, CVA (11/2021; residual L>R weakness), PE (4/2023 on Eliquis), uncontrolled DM-2 (on insulin), HTN, HLD, hx abdominal necrotizing fasciitis ( ostomy placement in 11/2021 with intubation from 11-12/2021), hx of frequent UTIs 2/2 to suprapubic catheter, most recent UTI c/b Urosepsis and discharged with midline for Ertapenem, recent cardiac stents now presenting due to generally not feeling well and concern for recurrent UTI, admitted for management of cellulitis. Patient is s/p I & D with sx team for abscess. ID following for abx management. Patient stable from abscess and bacteruria standpoint. Patient now s/p staged PCI 8/1/23 - LCx, being transferrd to cardiology/telemetry for further management.  44 y/o female with PMHx Asthma, CVA (11/2021; residual L>R weakness), PE (4/2023 on Eliquis), uncontrolled DM-2 (on insulin), HTN, HLD, hx abdominal necrotizing fasciitis ( ostomy placement in 11/2021 with intubation from 11-12/2021), hx of frequent UTIs 2/2 to suprapubic catheter, most recent UTI c/b Urosepsis and discharged with midline for Ertapenem, recent cardiac stents now presenting due to generally not feeling well and concern for recurrent UTI, admitted for management of cellulitis. Patient is s/p I & D with sx team for abscess. ID following for abx management. Patient stable from abscess and bacteruria standpoint. Patient now s/p staged PCI 8/1/23 LCx/OM1 (70% stenosis) and transferred to cardiology/telemetry for further management.

## 2023-08-01 NOTE — PROGRESS NOTE ADULT - PROBLEM SELECTOR PLAN 2
pt reports ongoing hx of dysuria, which worsened the past few days. Pt has a history of recurrent UTIs, which was attributed to suprapubic catheter. Catheter was removed during previous hospitalization in June  Initial UA negative of WBCs, leuk esterase and nitrites  Urine Cx: Staph aureus and enterococcus faecalis with sensitivities  Received Zosyn and Ertapenem for this symptoms   Asymptomatic at this time  Plan:  - No abx at this time   - Monitor for symptoms  - Monitor vitals and daily CBC

## 2023-08-01 NOTE — PROGRESS NOTE ADULT - SUBJECTIVE AND OBJECTIVE BOX
INFECTIOUS DISEASES CONSULT FOLLOW-UP NOTE    INTERVAL HPI/OVERNIGHT EVENTS:    Patient seen and examined at bedside. FAUSTINO. Continues to endorse pain however notes that pain near I&D site is improved compared to yesterday.       ROS:   Constitutional, eyes, ENT, cardiovascular, respiratory, gastrointestinal, genitourinary, integumentary, neurological, psychiatric and heme/lymph are otherwise negative other than noted above       ANTIBIOTICS/RELEVANT:    MEDICATIONS  (STANDING):  albuterol/ipratropium for Nebulization 3 milliLiter(s) Nebulizer every 6 hours  aspirin enteric coated 81 milliGRAM(s) Oral daily  atorvastatin 80 milliGRAM(s) Oral at bedtime  clopidogrel Tablet 75 milliGRAM(s) Oral daily  clotrimazole 2% Vaginal Cream 1 Applicatorful Vaginal daily  dextrose 5%. 1000 milliLiter(s) (100 mL/Hr) IV Continuous <Continuous>  dextrose 5%. 1000 milliLiter(s) (50 mL/Hr) IV Continuous <Continuous>  dextrose 50% Injectable 25 Gram(s) IV Push once  dextrose 50% Injectable 25 Gram(s) IV Push once  dextrose 50% Injectable 12.5 Gram(s) IV Push once  gabapentin 600 milliGRAM(s) Oral every 8 hours  glucagon  Injectable 1 milliGRAM(s) IntraMuscular once  insulin glargine Injectable (LANTUS) 50 Unit(s) SubCutaneous at bedtime  insulin glargine Injectable (LANTUS) 50 Unit(s) SubCutaneous every morning  insulin lispro (ADMELOG) corrective regimen sliding scale   SubCutaneous Before meals and at bedtime  insulin lispro Injectable (ADMELOG) 25 Unit(s) SubCutaneous three times a day before meals  linezolid    Tablet 600 milliGRAM(s) Oral every 12 hours  metoprolol succinate ER 50 milliGRAM(s) Oral every 24 hours  pantoprazole    Tablet 40 milliGRAM(s) Oral before breakfast  sodium chloride 0.9%. 500 milliLiter(s) (75 mL/Hr) IV Continuous <Continuous>    MEDICATIONS  (PRN):  acetaminophen     Tablet .. 650 milliGRAM(s) Oral every 6 hours PRN Temp greater or equal to 38C (100.4F), Mild Pain (1 - 3)  dextrose Oral Gel 15 Gram(s) Oral once PRN Blood Glucose LESS THAN 70 milliGRAM(s)/deciliter  diphenhydrAMINE Injectable 50 milliGRAM(s) IV Push once PRN Allergy symptoms  hydrocortisone sodium succinate Injectable 200 milliGRAM(s) IV Push once PRN Please give before cath lab  oxyCODONE    IR 5 milliGRAM(s) Oral every 6 hours PRN Severe Pain (7 - 10)  oxyCODONE    IR 10 milliGRAM(s) Oral every 12 hours PRN breakthrough pain        Vital Signs Last 24 Hrs  T(C): 36.9 (01 Aug 2023 06:19), Max: 36.9 (01 Aug 2023 06:19)  T(F): 98.4 (01 Aug 2023 06:19), Max: 98.4 (01 Aug 2023 06:19)  HR: 92 (01 Aug 2023 06:19) (90 - 97)  BP: 133/85 (01 Aug 2023 06:19) (130/67 - 139/82)  BP(mean): --  RR: 19 (01 Aug 2023 06:19) (18 - 19)  SpO2: 96% (01 Aug 2023 06:19) (96% - 100%)    Parameters below as of 01 Aug 2023 06:19  Patient On (Oxygen Delivery Method): room air        PHYSICAL EXAM:  Constitutional: alert, NAD  Eyes: the sclera and conjunctiva were normal.   ENT: the ears and nose were normal in appearance.   Neck: the appearance of the neck was normal and the neck was supple.   Pulmonary: no respiratory distress and lungs were clear to auscultation bilaterally.   Heart: heart rate was normal and rhythm regular, normal S1 and S2  Vascular:. there was no peripheral edema  Abdomen: normal bowel sounds, soft, non-tender  Derm: L labia I&D site packed- c/d/i  Neurological: no focal deficits.   Psychiatric: the affect was normal      LABS:                        10.6   7.61  )-----------( 373      ( 01 Aug 2023 05:30 )             35.8     08-01    136  |  100  |  14  ----------------------------<  345<H>  3.9   |  26  |  0.73    Ca    8.6      01 Aug 2023 05:30  Phos  3.0     08-01  Mg     1.6     08-01    TPro  6.3  /  Alb  2.8<L>  /  TBili  <0.2  /  DBili  x   /  AST  11  /  ALT  9<L>  /  AlkPhos  148<H>  08-01    PT/INR - ( 01 Aug 2023 05:30 )   PT: 11.1 sec;   INR: 0.97          PTT - ( 01 Aug 2023 05:30 )  PTT:31.3 sec  Urinalysis Basic - ( 01 Aug 2023 05:30 )    Color: x / Appearance: x / SG: x / pH: x  Gluc: 345 mg/dL / Ketone: x  / Bili: x / Urobili: x   Blood: x / Protein: x / Nitrite: x   Leuk Esterase: x / RBC: x / WBC x   Sq Epi: x / Non Sq Epi: x / Bacteria: x        MICROBIOLOGY:  reviewed     RADIOLOGY & ADDITIONAL STUDIES:  Reviewed

## 2023-08-01 NOTE — PROGRESS NOTE ADULT - PROBLEM SELECTOR PLAN 4
Pt with Hx of abdominal necrotizing fascitis s/p ostomy placement  - Pt with pain around the ostomy, on exam there is a hernia palpated at the ostomy site, tender to touch  - CT Abdomen from 6/21 showing Fat-containing parastomal hernia, unchanged  - Surgery following - no intervention at this time as it is easily reducible  - Continue pain control

## 2023-08-01 NOTE — PROGRESS NOTE ADULT - SUBJECTIVE AND OBJECTIVE BOX
Interventional Cardiology PA Adult Progress Note    Hospital Course:  44 y/o F, with contrast allergy, PMHx of HTN, HLD, poorly controlled DM-II, CVA (11/2021, residual LE weakness L>R), PE 4/2023 (on Eliquis), Asthma, abd necrotizing fasciitis s/p suprapubic catheter and ostomy in place (11/2021, requiring intubation at that time), and frequent UTIs 2/2 suprapubic catheter w/ recent hospitalization for Urosepsis 5/23-6/1 (Klebsiella and EColi, was discharged w/ L Midline for IV abx), recent admission [facility] 6/20 for chest pain s/p C 06/26/23 w/ NAT x 1 pRCA (85%) and NAT x 1 RPLS (80%); residual dLCx/OM1 70%, mLAD 50% with plan for staged intervention 5 weeks from index who presented to [facility] on 07/28/2023 with complaints of cellulitis of left groin abscess.  Patient underwent successful I&D on 07/30/23.  Patient undergoing IV antibitoic tx for abscess. Pt alsowith asx bacteruria. Patient stable from abscess standpoint and bacteuria standpoint. Patient now s/p staged PCI of ______. Patient transferred to cardiology/telemetry for further manaagement.     ROS Negative except as per Subjective and HPI  	  MEDICATIONS:  metoprolol succinate ER 50 milliGRAM(s) Oral every 24 hours  linezolid    Tablet 600 milliGRAM(s) Oral every 12 hours  albuterol/ipratropium for Nebulization 3 milliLiter(s) Nebulizer every 6 hours  diphenhydrAMINE Injectable 50 milliGRAM(s) IV Push once PRN  acetaminophen     Tablet .. 650 milliGRAM(s) Oral every 6 hours PRN  gabapentin 600 milliGRAM(s) Oral every 8 hours  oxyCODONE    IR 5 milliGRAM(s) Oral every 6 hours PRN  oxyCODONE    IR 10 milliGRAM(s) Oral every 12 hours PRN  pantoprazole    Tablet 40 milliGRAM(s) Oral before breakfast  atorvastatin 80 milliGRAM(s) Oral at bedtime  dextrose 50% Injectable 12.5 Gram(s) IV Push once  dextrose 50% Injectable 25 Gram(s) IV Push once  dextrose 50% Injectable 25 Gram(s) IV Push once  dextrose Oral Gel 15 Gram(s) Oral once PRN  glucagon  Injectable 1 milliGRAM(s) IntraMuscular once  hydrocortisone sodium succinate Injectable 200 milliGRAM(s) IV Push once PRN  insulin glargine Injectable (LANTUS) 50 Unit(s) SubCutaneous at bedtime  insulin glargine Injectable (LANTUS) 50 Unit(s) SubCutaneous every morning  insulin lispro (ADMELOG) corrective regimen sliding scale   SubCutaneous Before meals and at bedtime  insulin lispro Injectable (ADMELOG) 25 Unit(s) SubCutaneous three times a day before meals  aspirin enteric coated 81 milliGRAM(s) Oral daily  clopidogrel Tablet 75 milliGRAM(s) Oral daily  clotrimazole 2% Vaginal Cream 1 Applicatorful Vaginal daily  dextrose 5%. 1000 milliLiter(s) IV Continuous <Continuous>  dextrose 5%. 1000 milliLiter(s) IV Continuous <Continuous>  sodium chloride 0.9%. 500 milliLiter(s) IV Continuous <Continuous>      [PHYSICAL EXAM:  TELEMETRY:  T(C): 36.9 (08-01-23 @ 06:19), Max: 36.9 (08-01-23 @ 06:19)  HR: 92 (08-01-23 @ 06:19) (90 - 97)  BP: 133/85 (08-01-23 @ 06:19) (130/67 - 139/82)  RR: 19 (08-01-23 @ 06:19) (18 - 19)  SpO2: 96% (08-01-23 @ 06:19) (96% - 100%)  Wt(kg): --  I&O's Summary                                     Appearance: Normal, sitting in bed comfortably.   HEENT:   Normal oral mucosa, PERRL, EOMI	  Neck: Supple, - JVD; No Carotid Bruit   Cardiovascular: Normal S1 S2, No JVD, No murmurs,   Respiratory: Lungs clear to auscultation	  Gastrointestinal:  Soft, Non-tender, + BS	  Skin: No rashes, No ecchymoses, No cyanosis  Extremities: Normal range of motion, No clubbing, cyanosis or edema  Vascular: Peripheral pulses palpable 2+ bilaterally  Neurologic: Non-focal  Psychiatry: A & O x 3, Mood & affect appropriate       DIAGNOSTIC TESTING:  [ x] Echocardiogram: 06/21/2023:   1. Mild symmetric left ventricular hypertrophy.   2. Hyperdynamic left ventricular systolic function.   3. Normal right ventricular size and systolic function.   4. Normal atria.   5. No significant valvular disease.   6. No pericardial effusion.   7. No prior echo is available for comparison.    LABS:	 	                  10.6   7.61  )-----------( 373      ( 01 Aug 2023 05:30 )             35.8     08-01    136  |  100  |  14  ----------------------------<  345<H>  3.9   |  26  |  0.73    Ca    8.6      01 Aug 2023 05:30  Phos  3.0     08-01  Mg     1.6     08-01    TPro  6.3  /  Alb  2.8<L>  /  TBili  <0.2  /  DBili  x   /  AST  11  /  ALT  9<L>  /  AlkPhos  148<H>  08-01    proBNP: 435  Lipid Profile: Total cholesterol: 214, LDL: 151  HDL: 34  HgA1c: 11.9  TSH: 0.305  PT/INR - ( 01 Aug 2023 05:30 )   PT: 11.1 sec;   INR: 0.97     PTT - ( 01 Aug 2023 05:30 )  PTT:31.3 sec   Interventional Cardiology PA Adult Progress Note    Hospital Course:  44 y/o F, with contrast allergy, PMHx of HTN, HLD, poorly controlled DM-II, CVA (11/2021, residual LE weakness L>R), PE 4/2023 (on Eliquis), Asthma, abd necrotizing fasciitis s/p suprapubic catheter and ostomy in place (11/2021, requiring intubation at that time), and frequent UTIs 2/2 suprapubic catheter w/ recent hospitalization for Urosepsis 5/23-6/1 (Klebsiella and EColi, was discharged w/ L Midline for IV abx), recent admission [facility] 6/20 for chest pain s/p C 06/26/23 w/ NAT x 1 pRCA (85%) and NAT x 1 RPLS (80%); residual dLCx/OM1 70%, mLAD 50% with plan for staged intervention 5 weeks from index who presented to [facility] on 07/28/2023 with complaints of cellulitis of left groin abscess.  Patient underwent successful I&D on 07/30/23.  Patient undergoing IV antibitoic tx for abscess. Pt also with asx bacteruria. Patient stable from abscess standpoint and bacteuria standpoint. Patient now s/p staged PCI of Lcx/OM1. Patient transferred to cardiology/telemetry for further management     ROS Negative except as per Subjective and HPI  	  MEDICATIONS:  metoprolol succinate ER 50 milliGRAM(s) Oral every 24 hours  linezolid    Tablet 600 milliGRAM(s) Oral every 12 hours  albuterol/ipratropium for Nebulization 3 milliLiter(s) Nebulizer every 6 hours  diphenhydrAMINE Injectable 50 milliGRAM(s) IV Push once PRN  acetaminophen     Tablet .. 650 milliGRAM(s) Oral every 6 hours PRN  gabapentin 600 milliGRAM(s) Oral every 8 hours  oxyCODONE    IR 5 milliGRAM(s) Oral every 6 hours PRN  oxyCODONE    IR 10 milliGRAM(s) Oral every 12 hours PRN  pantoprazole    Tablet 40 milliGRAM(s) Oral before breakfast  atorvastatin 80 milliGRAM(s) Oral at bedtime  dextrose 50% Injectable 12.5 Gram(s) IV Push once  dextrose 50% Injectable 25 Gram(s) IV Push once  dextrose 50% Injectable 25 Gram(s) IV Push once  dextrose Oral Gel 15 Gram(s) Oral once PRN  glucagon  Injectable 1 milliGRAM(s) IntraMuscular once  hydrocortisone sodium succinate Injectable 200 milliGRAM(s) IV Push once PRN  insulin glargine Injectable (LANTUS) 50 Unit(s) SubCutaneous at bedtime  insulin glargine Injectable (LANTUS) 50 Unit(s) SubCutaneous every morning  insulin lispro (ADMELOG) corrective regimen sliding scale   SubCutaneous Before meals and at bedtime  insulin lispro Injectable (ADMELOG) 25 Unit(s) SubCutaneous three times a day before meals  aspirin enteric coated 81 milliGRAM(s) Oral daily  clopidogrel Tablet 75 milliGRAM(s) Oral daily  clotrimazole 2% Vaginal Cream 1 Applicatorful Vaginal daily  dextrose 5%. 1000 milliLiter(s) IV Continuous <Continuous>  dextrose 5%. 1000 milliLiter(s) IV Continuous <Continuous>  sodium chloride 0.9%. 500 milliLiter(s) IV Continuous <Continuous>      [PHYSICAL EXAM:  TELEMETRY:  T(C): 36.9 (08-01-23 @ 06:19), Max: 36.9 (08-01-23 @ 06:19)  HR: 92 (08-01-23 @ 06:19) (90 - 97)  BP: 133/85 (08-01-23 @ 06:19) (130/67 - 139/82)  RR: 19 (08-01-23 @ 06:19) (18 - 19)  SpO2: 96% (08-01-23 @ 06:19) (96% - 100%)  Wt(kg): --  I&O's Summary                                     Appearance: Normal, sitting in bed comfortably.   HEENT:   Normal oral mucosa, PERRL, EOMI	  Neck: Supple, - JVD; No Carotid Bruit   Cardiovascular: Normal S1 S2, No JVD, No murmurs,   Respiratory: Lungs clear to auscultation	  Gastrointestinal:  Soft, Non-tender, + BS	  Skin: No rashes, No ecchymoses, No cyanosis  Extremities: Normal range of motion, No clubbing, cyanosis or edema  Vascular: Peripheral pulses palpable 2+ bilaterally  Neurologic: Non-focal  Psychiatry: A & O x 3, Mood & affect appropriate       DIAGNOSTIC TESTING:  [ x] Echocardiogram: 06/21/2023:   1. Mild symmetric left ventricular hypertrophy.   2. Hyperdynamic left ventricular systolic function.   3. Normal right ventricular size and systolic function.   4. Normal atria.   5. No significant valvular disease.   6. No pericardial effusion.   7. No prior echo is available for comparison.    LABS:	 	                  10.6   7.61  )-----------( 373      ( 01 Aug 2023 05:30 )             35.8     08-01    136  |  100  |  14  ----------------------------<  345<H>  3.9   |  26  |  0.73    Ca    8.6      01 Aug 2023 05:30  Phos  3.0     08-01  Mg     1.6     08-01    TPro  6.3  /  Alb  2.8<L>  /  TBili  <0.2  /  DBili  x   /  AST  11  /  ALT  9<L>  /  AlkPhos  148<H>  08-01    proBNP: 435  Lipid Profile: Total cholesterol: 214, LDL: 151  HDL: 34  HgA1c: 11.9  TSH: 0.305  PT/INR - ( 01 Aug 2023 05:30 )   PT: 11.1 sec;   INR: 0.97     PTT - ( 01 Aug 2023 05:30 )  PTT:31.3 sec   Interventional Cardiology PA Adult Progress Note    Hospital Course:  42 y/o F, with contrast allergy, PMHx of HTN, HLD, poorly controlled DM-II, CVA (11/2021, residual LE weakness L>R), PE 4/2023 (on Eliquis), Asthma, abd necrotizing fasciitis s/p suprapubic catheter and ostomy in place (11/2021, requiring intubation at that time), and frequent UTIs 2/2 suprapubic catheter w/ recent hospitalization for Urosepsis 5/23-6/1 (Klebsiella and EColi, was discharged w/ L Midline for IV abx), recent admission [facility] 6/20 for chest pain s/p C 06/26/23 w/ NAT x 1 pRCA (85%) and NTA x 1 RPLS (80%); residual dLCx/OM1 70%, mLAD 50% with plan for staged intervention 5 weeks from index who presented to [facility] on 07/28/2023 with complaints of cellulitis of left groin abscess.  Patient underwent successful I&D on 07/30/23.  Patient undergoing IV antibitoic tx for abscess. Pt also with asx bacteruria. Patient stable from abscess standpoint and bacteuria standpoint. Patient now s/p staged PCI of Lcx/OM1 (75%). Patient transferred to cardiology/telemetry for further management     ROS Negative except as per Subjective and HPI  	  MEDICATIONS:  metoprolol succinate ER 50 milliGRAM(s) Oral every 24 hours  linezolid    Tablet 600 milliGRAM(s) Oral every 12 hours  albuterol/ipratropium for Nebulization 3 milliLiter(s) Nebulizer every 6 hours  diphenhydrAMINE Injectable 50 milliGRAM(s) IV Push once PRN  acetaminophen     Tablet .. 650 milliGRAM(s) Oral every 6 hours PRN  gabapentin 600 milliGRAM(s) Oral every 8 hours  oxyCODONE    IR 5 milliGRAM(s) Oral every 6 hours PRN  oxyCODONE    IR 10 milliGRAM(s) Oral every 12 hours PRN  pantoprazole    Tablet 40 milliGRAM(s) Oral before breakfast  atorvastatin 80 milliGRAM(s) Oral at bedtime  dextrose 50% Injectable 12.5 Gram(s) IV Push once  dextrose 50% Injectable 25 Gram(s) IV Push once  dextrose 50% Injectable 25 Gram(s) IV Push once  dextrose Oral Gel 15 Gram(s) Oral once PRN  glucagon  Injectable 1 milliGRAM(s) IntraMuscular once  hydrocortisone sodium succinate Injectable 200 milliGRAM(s) IV Push once PRN  insulin glargine Injectable (LANTUS) 50 Unit(s) SubCutaneous at bedtime  insulin glargine Injectable (LANTUS) 50 Unit(s) SubCutaneous every morning  insulin lispro (ADMELOG) corrective regimen sliding scale   SubCutaneous Before meals and at bedtime  insulin lispro Injectable (ADMELOG) 25 Unit(s) SubCutaneous three times a day before meals  aspirin enteric coated 81 milliGRAM(s) Oral daily  clopidogrel Tablet 75 milliGRAM(s) Oral daily  clotrimazole 2% Vaginal Cream 1 Applicatorful Vaginal daily  dextrose 5%. 1000 milliLiter(s) IV Continuous <Continuous>  dextrose 5%. 1000 milliLiter(s) IV Continuous <Continuous>  sodium chloride 0.9%. 500 milliLiter(s) IV Continuous <Continuous>      [PHYSICAL EXAM:  TELEMETRY:  T(C): 36.9 (08-01-23 @ 06:19), Max: 36.9 (08-01-23 @ 06:19)  HR: 92 (08-01-23 @ 06:19) (90 - 97)  BP: 133/85 (08-01-23 @ 06:19) (130/67 - 139/82)  RR: 19 (08-01-23 @ 06:19) (18 - 19)  SpO2: 96% (08-01-23 @ 06:19) (96% - 100%)  Wt(kg): --  I&O's Summary                                     Appearance: Normal, sitting in bed comfortably.   HEENT:   Normal oral mucosa, PERRL, EOMI	  Neck: Supple, - JVD; No Carotid Bruit   Cardiovascular: Normal S1 S2, No JVD, No murmurs,   Respiratory: Lungs clear to auscultation	  Gastrointestinal:  Soft, Non-tender, + BS	  Skin: No rashes, No ecchymoses, No cyanosis  Extremities: Normal range of motion, No clubbing, cyanosis or edema  Vascular: Peripheral pulses palpable 1+ bilaterally  Neurologic: Non-focal  Psychiatry: A & O x 3, Mood & affect appropriate       DIAGNOSTIC TESTING:  [ x] Echocardiogram: 06/21/2023:   1. Mild symmetric left ventricular hypertrophy.   2. Hyperdynamic left ventricular systolic function.   3. Normal right ventricular size and systolic function.   4. Normal atria.   5. No significant valvular disease.   6. No pericardial effusion.   7. No prior echo is available for comparison.    LABS:	 	                  10.6   7.61  )-----------( 373      ( 01 Aug 2023 05:30 )             35.8     08-01    136  |  100  |  14  ----------------------------<  345<H>  3.9   |  26  |  0.73    Ca    8.6      01 Aug 2023 05:30  Phos  3.0     08-01  Mg     1.6     08-01    TPro  6.3  /  Alb  2.8<L>  /  TBili  <0.2  /  DBili  x   /  AST  11  /  ALT  9<L>  /  AlkPhos  148<H>  08-01    proBNP: 435  Lipid Profile: Total cholesterol: 214, LDL: 151  HDL: 34  HgA1c: 11.9  TSH: 0.305  PT/INR - ( 01 Aug 2023 05:30 )   PT: 11.1 sec;   INR: 0.97     PTT - ( 01 Aug 2023 05:30 )  PTT:31.3 sec

## 2023-08-01 NOTE — PROGRESS NOTE ADULT - PROBLEM SELECTOR PLAN 8
F: None  E: replete K>4 Mg>2  N: consistent carb  D: On Eliquis and Plavix, HOLD ELIQUIS AFTER MIDNIGHT   Dispo: [facility] History of bilateral pulmonary emboli in segmental and subsegmental branches on the R and subsegmental branches on the L, diagnosed on CT chest during ED visit on 4/7/23. Home meds: Eliquis 5mg BID  No respiratory distress at this time   Plan:  - Hold Eliquis until after cath  - PT consult

## 2023-08-01 NOTE — PROGRESS NOTE ADULT - PROBLEM SELECTOR PLAN 5
Pt with chest discomfort on exertion. Has a Hx of PCI. LHC from 06/26 with 70% dLCx/OM1 originally due for staged intervention in 1 week  Currently having chest discomfort. ECG with NSR and no signs of ischemic changes. Trops negative.   s/p ASA 325mg PO loading and d/c Eliquis   Currently on Lipitor 80mg qd (recently switched), toprol 50mg qd and Plavix 75mg qd  Plan:  - PCI with Dr Carver on 8/1  - Continue medications as above   - Start ASA 81mg PO daily on 8/1

## 2023-08-01 NOTE — PROGRESS NOTE ADULT - PROBLEM SELECTOR PLAN 3
Pt reports ongoing hx of dysuria, with + history recurrent UTIS 2/2 suprapubic catheter (which was removed 06/2023)  - Initial UA 06/27/23 - negative of WBCs, leuk esterase and nitrites  - Urine Cx: Staph aureus and enterococcus faecalis with sensitivities  - Received Zosyn and Ertapenem for this symptoms   - Asymptomatic at this time - no abx as per medicine

## 2023-08-01 NOTE — PROGRESS NOTE ADULT - PROBLEM SELECTOR PLAN 4
Pt was admitted to cardiac service during last admission. Pt underwent CCTA on 06/23/2023 revealing Ca score 129 (accuracy of score is limited by image noise), probable severe pRCA stenosis, mod D2 stenosis, non obstructive in remaining coronaries. Pt is s/p cardiac angiogram on 6/26 and is s/p IVUS guided NAT pRCA (85%), NAT RPLS (80%); residual dLCx/OM1 70%, mLAD 50%. Pt was instructed to return for staged PCI of LCx and OM1 only if symptomatic  Pt having chest pain, ECG wnl, troponin neg  Cardiology following   Plan:   - Increased Atorvastatin to 80 at bedtime  - c/w toprol 50mg PO q24h (hold HR <60, SBP <100)   - c/w Plavix 75mg PO daily  - Loaded with ASA 325mg PO x1 on 7/31, will start ASA 81mg PO qd per cardio recs

## 2023-08-01 NOTE — PROGRESS NOTE ADULT - PROBLEM SELECTOR PLAN 1
Pt recently admitted to cardiology 06/23/23 underwent cardiac cath - 6/26/23: IVUS guided NAT pRCA (85%), NAT RPLS (80%); residual dLCx/OM1 70%, mLAD 50%. Pt was instructed to return for staged PCI of LCx and OM1 only if symptomatic  - Pt complaining of CP, ecg WNL, troponin neg  - ECHO 06/23/23: Mild LVH. Hyperdyanamic LVSF. Normal RV/RVSF.  - S/P staged PCI 08/01/2023:   - Continue Aspirin 81 mg daily, Plavix 75 mg daily, Atorvastatin 80 mg daily, Toprol XL 50 mg daily Pt recently admitted to cardiology 06/23/23 underwent cardiac cath - 6/26/23: IVUS guided NAT pRCA (85%), NAT RPLS (80%); residual dLCx/OM1 70%, mLAD 50%. Pt was instructed to return for staged PCI of LCx and OM1 only if symptomatic  - Pt complaining of CP, ecg WNL, troponin neg  - ECHO 06/23/23: Mild LVH. Hyperdyanamic LVSF. Normal RV/RVSF.  - S/P staged PCI 08/01/2023 mLCx; EDP: 30 (IC team did not want eliquis)  - Continue Aspirin 81 mg daily, Plavix 75 mg daily, Eliquis 5 mg BID, Atorvastatin 80 mg daily   - Drop aspirin tomorrow 08/02/2023 Pt recently admitted to cardiology 06/23/23 underwent cardiac cath - 6/26/23: IVUS guided NAT pRCA (85%), NAT RPLS (80%); residual dLCx/OM1 70%, mLAD 50%. Pt was instructed to return for staged PCI of LCx and OM1 only if symptomatic  - Pt complaining of CP, ecg WNL, troponin neg  - ECHO 06/23/23: Mild LVH. Hyperdyanamic LVSF. Normal RV/RVSF.  - S/P staged PCI 08/01/2023 NAT x 1 mLCx/OM1 (70%), RCA: patent stents, LM: short segment, MLI, mLAD: 40%, EDP: 29 mmHg (IC team did not want lasix); no EF: R TR at 8 PM.   - Continue Aspirin 81 mg daily, Plavix 75 mg daily, Eliquis 5 mg BID, Atorvastatin 80 mg daily   - Drop aspirin tomorrow 08/02/2023 and start Eliquis 5 mg BID Pt recently admitted to cardiology 06/23/23 underwent cardiac cath - 6/26/23: IVUS guided NAT pRCA (85%), NAT RPLS (80%); residual dLCx/OM1 70%, mLAD 50%. Pt was instructed to return for staged PCI of LCx and OM1 only if symptomatic  - Pt complaining of CP, ecg WNL, troponin neg  - ECHO 06/23/23: Mild LVH. Hyperdyanamic LVSF. Normal RV/RVSF.  - S/P staged PCI 08/01/2023 NAT x 1 mLCx/OM1 (70%), RCA: patent stents, LM: short segment, MLI, mLAD: 40%, EDP: 29 mmHg (IC team did not want lasix); no EF: R TR at 8 PM.   - Continue Aspirin 81 mg daily, Plavix 75 mg daily, Eliquis 5 mg BID, Atorvastatin 80 mg daily   - Drop aspirin tomorrow 08/02/2023 and start Eliquis 5 mg BID once band is off tonight

## 2023-08-01 NOTE — PROGRESS NOTE ADULT - SUBJECTIVE AND OBJECTIVE BOX
Precath Checklist    Hospital Course: 42 y/o F, with contrast allergy, PMHx of HTN, HLD, poorly controlled DM-II, CVA (11/2021, residual LE weakness L>R), PE 4/2023 (on Eliquis), Asthma, abd necrotizing fasciitis s/p suprapubic catheter and ostomy in place (11/2021, requiring intubation at that time), and frequent UTIs 2/2 suprapubic catheter w/ recent hospitalization for Urosepsis 5/23-6/1 (Klebsiella and EColi, was discharged w/ L Midline for IV abx), recent admission [facility] 6/20 for chest pain s/p C 06/26/23 w/ NAT x 1 pRCA (85%) and NAT x 1 RPLS (80%); residual dLCx/OM1 70%, mLAD 50% with plan for staged intervention 5 weeks from index who presented to [facility] on 07/28/2023 with complaints of cellulitis of left groin abscess.  Patient underwent successful I&D on 07/30/23.  Patient s/p Ertapenem 1g in ED, s/p Zosyn 07/28-07/29/2023 and s/p Daptomycin 600mg Q24H and Ertapenem 1g q24h from 7/29-7/30. Patietn with asx bacteruria. Patient stable from abscess standpoint and bacteuria standpoint. Patient to undergo staged PCI of mLAD today due to patient's risk factors and known residual disease.     Allergies:  vancomycin (Anaphylaxis; Hives)  shellfish. (Hives; Anaphylaxis)  iodine containing compounds (Hives; Anaphylaxis)  Bactrim I.V. (Rash (Mod to Severe))  clindamycin (Pruritus)  amoxicillin (Short breath; Rash)  penicillin (Hives)  fish (Hives; Urticaria)      Shellfish/Contrast Allergies?  Ye; If Yes, Premedication Ordered: [x  ] Yes      PULSES:	   B	            R	                FEM         	                                 DP/PT  Right		                                Yes/No     Bruit	    Left		                                        Yes/No     Bruit                10.6   7.61  )-----------( 373      ( 01 Aug 2023 05:30 )             35.8     08-01    136  |  100  |  14  ----------------------------<  345<H>  3.9   |  26  |  0.73    Ca    8.6      01 Aug 2023 05:30  Phos  3.0     08-01  Mg     1.6     08-01    TPro  6.3  /  Alb  2.8<L>  /  TBili  <0.2  /  DBili  x   /  AST  11  /  ALT  9<L>  /  AlkPhos  148<H>  08-01      Prothrombin Time, Plasma: 11.1 sec [9.5 - 13.0] (08-01-23 @ 05:30)  INR: 0.97 [0.85 - 1.18] (08-01-23 @ 05:30)  Activated Partial Thromboplastin Time: 31.3 sec [24.5 - 35.6] (08-01-23 @ 05:30)  Activated Partial Thromboplastin Time: 31.0 sec [24.5 - 35.6] (07-30-23 @ 09:15)  Prothrombin Time, Plasma: 10.9 sec [9.5 - 13.0] (07-30-23 @ 01:21)  INR: 0.95 [0.85 - 1.18] (07-30-23 @ 01:21)  Activated Partial Thromboplastin Time: 91.0 sec [24.5 - 35.6] (07-30-23 @ 01:21)  Activated Partial Thromboplastin Time: 80.3 sec [24.5 - 35.6] (07-29-23 @ 18:00)  Activated Partial Thromboplastin Time: 97.3 sec [24.5 - 35.6] (07-29-23 @ 12:25)    HCG results:  HCG Quantitative, Serum: <0 mIU/mL (07-27-23 @ 22:32)      EKG:  	    ASA III			Mallampati class: III	            Anginal Class: III    Sedation Plan:   [  ] None   [ x ] Moderate   [  ]  Deep    [  ]  General Anesthesia   Patient Is Suitable Candidate For Sedation?     [ x ] Yes   [  ] No   [  ] Not Applicable   Cath Order Entered: [ x ] Yes  DAPT LOAD Ordered: [x  ] Yes  [  ] No load 2/2 ________  Pre-Cath fluids Ordered: [ x ] Yes  [  ] Not indicated 2/2 _________    Risks Benefits Annotation:     CARDIAC CATH: Risks & benefits of procedure and sedation and risks and benefits for the alternative therapy have been explained to the patient and/or HCP in layman’s terms including but not limited to: allergic reaction, bleeding, infection, arrhythmia, respiratory compromise, renal and vascular compromise, limb damage, MI, CVA, emergent CABG/Vascular Surgery and death. Informed consent obtained and in chart. Precath Checklist    Hospital Course: 42 y/o F, with contrast allergy, PMHx of HTN, HLD, poorly controlled DM-II, CVA (11/2021, residual LE weakness L>R), PE 4/2023 (on Eliquis), Asthma, abd necrotizing fasciitis s/p suprapubic catheter and ostomy in place (11/2021, requiring intubation at that time), and frequent UTIs 2/2 suprapubic catheter w/ recent hospitalization for Urosepsis 5/23-6/1 (Klebsiella and EColi, was discharged w/ L Midline for IV abx), recent admission [facility] 6/20 for chest pain s/p C 06/26/23 w/ NAT x 1 pRCA (85%) and NAT x 1 RPLS (80%); residual dLCx/OM1 70%, mLAD 50% with plan for staged intervention 5 weeks from index who presented to [facility] on 07/28/2023 with complaints of cellulitis of left groin abscess.  Patient underwent successful I&D on 07/30/23.  Patient s/p Ertapenem 1g in ED, s/p Zosyn 07/28-07/29/2023 and s/p Daptomycin 600mg Q24H and Ertapenem 1g q24h from 7/29-7/30. Patietn with asx bacteruria. Patient stable from abscess standpoint and bacteuria standpoint. Patient to undergo staged PCI of mLAD today due to patient's risk factors and known residual disease.     Allergies:  vancomycin (Anaphylaxis; Hives)  shellfish. (Hives; Anaphylaxis)  iodine containing compounds (Hives; Anaphylaxis)  Bactrim I.V. (Rash (Mod to Severe))  clindamycin (Pruritus)  amoxicillin (Short breath; Rash)  penicillin (Hives)  fish (Hives; Urticaria)      Shellfish/Contrast Allergies?  Ye; If Yes, Premedication Ordered: [x  ] Yes      PULSES:	   R  1+ B/L       FEM   1+ B/L, no bruit   DP/PT 1+ B/L faint                 10.6   7.61  )-----------( 373      ( 01 Aug 2023 05:30 )             35.8     08-01    136  |  100  |  14  ----------------------------<  345<H>  3.9   |  26  |  0.73    Ca    8.6      01 Aug 2023 05:30  Phos  3.0     08-01  Mg     1.6     08-01    TPro  6.3  /  Alb  2.8<L>  /  TBili  <0.2  /  DBili  x   /  AST  11  /  ALT  9<L>  /  AlkPhos  148<H>  08-01      Prothrombin Time, Plasma: 11.1 sec [9.5 - 13.0] (08-01-23 @ 05:30)  INR: 0.97 [0.85 - 1.18] (08-01-23 @ 05:30)  Activated Partial Thromboplastin Time: 31.3 sec [24.5 - 35.6] (08-01-23 @ 05:30)  Activated Partial Thromboplastin Time: 31.0 sec [24.5 - 35.6] (07-30-23 @ 09:15)  Prothrombin Time, Plasma: 10.9 sec [9.5 - 13.0] (07-30-23 @ 01:21)  INR: 0.95 [0.85 - 1.18] (07-30-23 @ 01:21)  Activated Partial Thromboplastin Time: 91.0 sec [24.5 - 35.6] (07-30-23 @ 01:21)  Activated Partial Thromboplastin Time: 80.3 sec [24.5 - 35.6] (07-29-23 @ 18:00)  Activated Partial Thromboplastin Time: 97.3 sec [24.5 - 35.6] (07-29-23 @ 12:25)    HCG results:  HCG Quantitative, Serum: <0 mIU/mL (07-27-23 @ 22:32)    EKG:  NSR @ 62 bpm, no acute ST-T wave changes    ASA III			Mallampati class: III	            Anginal Class: III    Sedation Plan:   [  ] None   [ x ] Moderate   [  ]  Deep    [  ]  General Anesthesia   Patient Is Suitable Candidate For Sedation?     [ x ] Yes   [  ] No   [  ] Not Applicable   Cath Order Entered: [ x ] Yes  DAPT LOAD Ordered: [x  ] Yes  [  ] No load 2/2 ________  Pre-Cath fluids Ordered: [ x ] Yes  [  ] Not indicated 2/2 _________    Risks Benefits Annotation:     CARDIAC CATH: Risks & benefits of procedure and sedation and risks and benefits for the alternative therapy have been explained to the patient and/or HCP in layman’s terms including but not limited to: allergic reaction, bleeding, infection, arrhythmia, respiratory compromise, renal and vascular compromise, limb damage, MI, CVA, emergent CABG/Vascular Surgery and death. Informed consent obtained and in chart.

## 2023-08-01 NOTE — PROGRESS NOTE ADULT - PROBLEM SELECTOR PLAN 6
History of bilateral pulmonary emboli in segmental and subsegmental branches on the R and subsegmental branches on the L, diagnosed on CT chest during ED visit on 4/7/23. Home meds: Eliquis 5mg BID  - Currently on heparin gtt  - resume eliquis post cath    F: IV NS 0.9%   cc/hr x 6 hours   E: replete K>4 Mg>2  N: dash/tlc/consistent carb  D: heparin (Holding eliquis)  Dispo: telemetry History of bilateral pulmonary emboli in segmental and subsegmental branches on the R and subsegmental branches on the L, diagnosed on CT chest during ED visit on 4/7/23. Home meds: Eliquis 5mg BID  - resume eliquis post cath    F: IV NS 0.9%   cc/hr x 6 hours   E: replete K>4 Mg>2  N: dash/tlc/consistent carb  D: heparin (Holding eliquis)  Dispo: telemetry History of bilateral pulmonary emboli in segmental and subsegmental branches on the R and subsegmental branches on the L, diagnosed on CT chest during ED visit on 4/7/23. Home meds: Eliquis 5mg BID  - resume eliquis post cath    F: IV NS 0.9%   cc/hr x 6 hours   E: replete K>4 Mg>2  N: dash/tlc/consistent carb  D: eliquis  Dispo: telemetry

## 2023-08-01 NOTE — PROGRESS NOTE ADULT - NS ATTEND AMEND GEN_ALL_CORE FT
Patient is 44 yo woman with CAD s/p PCI of RCA and RPLS, IDDM, CVA, SPC, abdominal wall necrotizing fasciitis, PEs on AC, admitted with purulent collection in her left groin s/p I&D with Cx positive for MSSA and E. Faecium. followed by ID, with course notable for stable Angina in patient planned for staged PCI of the LCx/OM1, now s/p Successful PCI of the OM1, admitted to the Cardiology Service    - CAD s/p PCI: Given patient on Eliquis for Hx of DVT/PE, would plan to resume Eliquis tonight and Continue with Eliquis/Palvix 75 mg Po Daily. Will confirm with Interventional attending preference and/or Duration of triple therapy. Cont with high dose statin,lipitor 80 mg po daily. Cont with Toprol 50 mg po daily   - Groin abscess; Patient s/p I&D, Cx positive for MSSA and E. Faecium, now transitioned to PO Linezolid with ID. Cont Linezolid 600 mg PO q12h with Anticipated 7d course from I&D (7/30-8/5)  - DM: Persistently Hyperglycemic throughout hospitalization. Appreciate Medicine input for optimal Insulin regimen prior to Anticipated DC on 8/2  - Patient will need outpatient Cardiology follow up at [facility] with Dr Ramon. Anticipate [facility] scheduling for  on DC

## 2023-08-01 NOTE — PROGRESS NOTE ADULT - PROBLEM SELECTOR PLAN 9
F: s/p 4L in the ED  E: replete K>4 Mg>2  N: consistent carb  D: Heparin   Dispo: [facility]
F: s/p 4L in the ED  E: replete K>4 Mg>2  N: consistent carb  D: On Eliquis and Plavix  Dispo: [facility]
F: s/p 4L in the ED  E: replete K>4 Mg>2  N: consistent carb  D: Heparin   Dispo: [facility]
F: None  E: replete K>4 Mg>2  N: consistent carb  D: On Eliquis and Plavix, HOLD ELIQUIS AFTER MIDNIGHT   Dispo: [facility]
F: s/p 4L in the ED  E: replete K>4 Mg>2  N: consistent carb  D: eliquis  dispo: [facility]

## 2023-08-01 NOTE — PHYSICAL THERAPY INITIAL EVALUATION ADULT - GENERAL OBSERVATIONS, REHAB EVAL
pt received/returned semi-supine in bed +IV, +ostomy, +prima fit, +labial dressing C/D/I, c/o general malaise

## 2023-08-01 NOTE — PROGRESS NOTE ADULT - PROBLEM SELECTOR PLAN 7
History of bilateral pulmonary emboli in segmental and subsegmental branches on the R and subsegmental branches on the L, diagnosed on CT chest during ED visit on 4/7/23. Home meds: Eliquis 5mg BID  No respiratory distress at this time   Plan:  - Hold Eliquis until after cath  - PT consult Per previous hospitalization, pt was on Lantus 50u in AM and 50u at bedtime. Also on Lispro 25u before meals  Glucose >300 on arrival  Glucose at 200s now   On ISS  Plan:  -c/w above as well as mISS

## 2023-08-01 NOTE — PHYSICAL THERAPY INITIAL EVALUATION ADULT - ADDITIONAL COMMENTS
pt lives in an elevator access apt building w/ her kids and fiance. Ambulates w/ use of AFO and RW. Denies hx of recent falls. states that she requires assist from her family at times w/ ADLs

## 2023-08-01 NOTE — PROGRESS NOTE ADULT - ASSESSMENT
43F h/o CVA in 11/2021 with residual L>R weakness, PE on eliquis, uncontrolled T2DM on insulin, abdominal necrotizing fasciitis s/p ostomy in 11/2021, frequent UTI 2/2 suprapubic cath now removed p/w feeling unwell and purulent drainage and pain at L labia, found to have L labial abscess and UTI. Urine culture 7/27 growing MSSA and E. faecalis. L labia abscess drainage culture 7/28 growing MSSA. L inguinal abscess I&D culture 7/30 growing MSSA. L labial abscess culture 7/30 growing Staph haemolyticus and E. faecium (sensitivities pending). BCxs ngtd    Suggest:  -F/u abscess cultures and sensitivities    -Cont Linezolid 600 mg PO q12h    Team 2 will follow you.    Case d/w primary team.  Final recommendation pending attending note.    Aide Mckeon, Infectious Diseases PA  Please reach out for any questions 9 am-5pm. For evenings and weekends, please call the ID physician on call.  Work cell: [phone]    43F h/o CVA in 11/2021 with residual L>R weakness, PE on eliquis, uncontrolled T2DM on insulin, abdominal necrotizing fasciitis s/p ostomy in 11/2021, frequent UTI 2/2 suprapubic cath now removed p/w feeling unwell and purulent drainage and pain at L labia, found to have L labial abscess and UTI. Urine culture 7/27 growing MSSA and E. faecalis. L labia abscess drainage culture 7/28 growing MSSA. L inguinal abscess I&D culture 7/30 growing MSSA. L labial abscess culture 7/30 growing Staph haemolyticus (S to Linezolid) and E. faecium (sensitivities pending). BCxs ngtd    Suggest:  -F/u E. faecium sensitivities    -Cont Linezolid 600 mg PO q12h     -Anticipating 7d course from I&D (7/30-8/5)  -Patient complaining of vaginal discharge/itching, can give Fluconazole 200mg PO x 1    Team 2 will follow you.    Case d/w primary team.  Final recommendation pending attending note.    Aide Mckeon, Infectious Diseases PA  Please reach out for any questions 9 am-5pm. For evenings and weekends, please call the ID physician on call.  Work cell: [phone]

## 2023-08-01 NOTE — PROGRESS NOTE ADULT - PROBLEM SELECTOR PLAN 2
Purulent cellulitis started as pimple on her labia, which progressed to swelling, erythema, tenderness from L labia to inner thigh   - S/P I & D with surgery on 07/30/23   - Would culture: Staphylococcus aureus; BCx: NGTD  - ID following   - S/P Ertapenem 1g In ED; s/p Zosyn from 07/28-07/29; s/p Daptomycin 600mg Q24H and Ertapenem 1g q24h from 7/29-7/30  - C/w Linezolid 600mg PO Q12H (7/31- )  - c/w Oxycodone 10mg for breakthrough pain and Oxycodone 5mg for severe pain  - Tylenol q6h PRN Q6H for fever Purulent cellulitis started as pimple on her labia, which progressed to swelling, erythema, tenderness from L labia to inner thigh   - S/P I & D with surgery on 07/30/23   - Would culture: Staphylococcus aureus; BCx: NGTD  - ID following   - S/P Ertapenem 1g In ED; s/p Zosyn from 07/28-07/29; s/p Daptomycin 600mg Q24H and Ertapenem 1g q24h from 7/29-7/30  - F/u E. faecium sensitivities    -Cont Linezolid 600 mg PO q12h - Anticipating 7d course from I&D (7/30-8/5)  - Patient complaining of vaginal discharge/itching, can give Fluconazole 200mg PO x 1  - c/w Oxycodone 10mg for breakthrough pain and Oxycodone 5mg for severe pain  - Tylenol q6h PRN Q6H for fever

## 2023-08-02 ENCOUNTER — TRANSCRIPTION ENCOUNTER (OUTPATIENT)
Age: 43
End: 2023-08-02

## 2023-08-02 DIAGNOSIS — I10 ESSENTIAL (PRIMARY) HYPERTENSION: ICD-10-CM

## 2023-08-02 DIAGNOSIS — E78.5 HYPERLIPIDEMIA, UNSPECIFIED: ICD-10-CM

## 2023-08-02 LAB
-  AMPICILLIN: SIGNIFICANT CHANGE UP
-  CLINDAMYCIN: SIGNIFICANT CHANGE UP
-  DAPTOMYCIN: SIGNIFICANT CHANGE UP
-  ERYTHROMYCIN: SIGNIFICANT CHANGE UP
-  LINEZOLID: SIGNIFICANT CHANGE UP
-  LINEZOLID: SIGNIFICANT CHANGE UP
-  OXACILLIN: SIGNIFICANT CHANGE UP
-  RIFAMPIN: SIGNIFICANT CHANGE UP
-  TRIMETHOPRIM/SULFAMETHOXAZOLE: SIGNIFICANT CHANGE UP
-  VANCOMYCIN: SIGNIFICANT CHANGE UP
-  VANCOMYCIN: SIGNIFICANT CHANGE UP
ANION GAP SERPL CALC-SCNC: 13 MMOL/L — SIGNIFICANT CHANGE UP (ref 5–17)
BASOPHILS # BLD AUTO: 0.03 K/UL — SIGNIFICANT CHANGE UP (ref 0–0.2)
BASOPHILS NFR BLD AUTO: 0.2 % — SIGNIFICANT CHANGE UP (ref 0–2)
BUN SERPL-MCNC: 18 MG/DL — SIGNIFICANT CHANGE UP (ref 7–23)
CALCIUM SERPL-MCNC: 8.8 MG/DL — SIGNIFICANT CHANGE UP (ref 8.4–10.5)
CHLORIDE SERPL-SCNC: 102 MMOL/L — SIGNIFICANT CHANGE UP (ref 96–108)
CO2 SERPL-SCNC: 18 MMOL/L — LOW (ref 22–31)
CREAT SERPL-MCNC: 0.72 MG/DL — SIGNIFICANT CHANGE UP (ref 0.5–1.3)
CULTURE RESULTS: SIGNIFICANT CHANGE UP
EGFR: 106 ML/MIN/1.73M2 — SIGNIFICANT CHANGE UP
EOSINOPHIL # BLD AUTO: 0.04 K/UL — SIGNIFICANT CHANGE UP (ref 0–0.5)
EOSINOPHIL NFR BLD AUTO: 0.3 % — SIGNIFICANT CHANGE UP (ref 0–6)
GLUCOSE BLDC GLUCOMTR-MCNC: 215 MG/DL — HIGH (ref 70–99)
GLUCOSE BLDC GLUCOMTR-MCNC: 225 MG/DL — HIGH (ref 70–99)
GLUCOSE BLDC GLUCOMTR-MCNC: 260 MG/DL — HIGH (ref 70–99)
GLUCOSE BLDC GLUCOMTR-MCNC: 335 MG/DL — HIGH (ref 70–99)
GLUCOSE BLDC GLUCOMTR-MCNC: 368 MG/DL — HIGH (ref 70–99)
GLUCOSE BLDC GLUCOMTR-MCNC: 387 MG/DL — HIGH (ref 70–99)
GLUCOSE BLDC GLUCOMTR-MCNC: 422 MG/DL — HIGH (ref 70–99)
GLUCOSE BLDC GLUCOMTR-MCNC: 457 MG/DL — CRITICAL HIGH (ref 70–99)
GLUCOSE SERPL-MCNC: 534 MG/DL — CRITICAL HIGH (ref 70–99)
HCT VFR BLD CALC: 36.6 % — SIGNIFICANT CHANGE UP (ref 34.5–45)
HGB BLD-MCNC: 10.8 G/DL — LOW (ref 11.5–15.5)
IMM GRANULOCYTES NFR BLD AUTO: 0.7 % — SIGNIFICANT CHANGE UP (ref 0–0.9)
ISTAT ACTK (ACTIVATED CLOTTING TIME KAOLIN): 281 SEC — HIGH (ref 74–137)
LYMPHOCYTES # BLD AUTO: 2.88 K/UL — SIGNIFICANT CHANGE UP (ref 1–3.3)
LYMPHOCYTES # BLD AUTO: 22.8 % — SIGNIFICANT CHANGE UP (ref 13–44)
MAGNESIUM SERPL-MCNC: 1.8 MG/DL — SIGNIFICANT CHANGE UP (ref 1.6–2.6)
MCHC RBC-ENTMCNC: 23.6 PG — LOW (ref 27–34)
MCHC RBC-ENTMCNC: 29.5 GM/DL — LOW (ref 32–36)
MCV RBC AUTO: 80.1 FL — SIGNIFICANT CHANGE UP (ref 80–100)
METHOD TYPE: SIGNIFICANT CHANGE UP
MONOCYTES # BLD AUTO: 0.93 K/UL — HIGH (ref 0–0.9)
MONOCYTES NFR BLD AUTO: 7.4 % — SIGNIFICANT CHANGE UP (ref 2–14)
NEUTROPHILS # BLD AUTO: 8.64 K/UL — HIGH (ref 1.8–7.4)
NEUTROPHILS NFR BLD AUTO: 68.6 % — SIGNIFICANT CHANGE UP (ref 43–77)
NRBC # BLD: 0 /100 WBCS — SIGNIFICANT CHANGE UP (ref 0–0)
ORGANISM # SPEC MICROSCOPIC CNT: SIGNIFICANT CHANGE UP
PHOSPHATE SERPL-MCNC: 2 MG/DL — LOW (ref 2.5–4.5)
PLATELET # BLD AUTO: 486 K/UL — HIGH (ref 150–400)
POTASSIUM SERPL-MCNC: 4.9 MMOL/L — SIGNIFICANT CHANGE UP (ref 3.5–5.3)
POTASSIUM SERPL-SCNC: 4.9 MMOL/L — SIGNIFICANT CHANGE UP (ref 3.5–5.3)
RBC # BLD: 4.57 M/UL — SIGNIFICANT CHANGE UP (ref 3.8–5.2)
RBC # FLD: 15.8 % — HIGH (ref 10.3–14.5)
SODIUM SERPL-SCNC: 133 MMOL/L — LOW (ref 135–145)
SPECIMEN SOURCE: SIGNIFICANT CHANGE UP
WBC # BLD: 12.61 K/UL — HIGH (ref 3.8–10.5)
WBC # FLD AUTO: 12.61 K/UL — HIGH (ref 3.8–10.5)

## 2023-08-02 PROCEDURE — 99232 SBSQ HOSP IP/OBS MODERATE 35: CPT

## 2023-08-02 PROCEDURE — 99254 IP/OBS CNSLTJ NEW/EST MOD 60: CPT

## 2023-08-02 PROCEDURE — 99253 IP/OBS CNSLTJ NEW/EST LOW 45: CPT

## 2023-08-02 RX ORDER — ONDANSETRON 8 MG/1
4 TABLET, FILM COATED ORAL ONCE
Refills: 0 | Status: COMPLETED | OUTPATIENT
Start: 2023-08-02 | End: 2023-08-02

## 2023-08-02 RX ORDER — METOCLOPRAMIDE HCL 10 MG
10 TABLET ORAL ONCE
Refills: 0 | Status: COMPLETED | OUTPATIENT
Start: 2023-08-02 | End: 2023-08-02

## 2023-08-02 RX ORDER — METOCLOPRAMIDE HCL 10 MG
8 TABLET ORAL ONCE
Refills: 0 | Status: DISCONTINUED | OUTPATIENT
Start: 2023-08-02 | End: 2023-08-02

## 2023-08-02 RX ORDER — METOCLOPRAMIDE HCL 10 MG
10 TABLET ORAL ONCE
Refills: 0 | Status: DISCONTINUED | OUTPATIENT
Start: 2023-08-02 | End: 2023-08-02

## 2023-08-02 RX ADMIN — LINEZOLID 600 MILLIGRAM(S): 600 INJECTION, SOLUTION INTRAVENOUS at 06:18

## 2023-08-02 RX ADMIN — OXYCODONE HYDROCHLORIDE 10 MILLIGRAM(S): 5 TABLET ORAL at 12:00

## 2023-08-02 RX ADMIN — GABAPENTIN 600 MILLIGRAM(S): 400 CAPSULE ORAL at 22:11

## 2023-08-02 RX ADMIN — Medication 12: at 17:27

## 2023-08-02 RX ADMIN — GABAPENTIN 600 MILLIGRAM(S): 400 CAPSULE ORAL at 13:51

## 2023-08-02 RX ADMIN — OXYCODONE HYDROCHLORIDE 5 MILLIGRAM(S): 5 TABLET ORAL at 07:01

## 2023-08-02 RX ADMIN — Medication 25 UNIT(S): at 08:45

## 2023-08-02 RX ADMIN — Medication 50 MILLIGRAM(S): at 12:00

## 2023-08-02 RX ADMIN — PANTOPRAZOLE SODIUM 40 MILLIGRAM(S): 20 TABLET, DELAYED RELEASE ORAL at 06:18

## 2023-08-02 RX ADMIN — ATORVASTATIN CALCIUM 80 MILLIGRAM(S): 80 TABLET, FILM COATED ORAL at 22:11

## 2023-08-02 RX ADMIN — CLOPIDOGREL BISULFATE 75 MILLIGRAM(S): 75 TABLET, FILM COATED ORAL at 12:00

## 2023-08-02 RX ADMIN — OXYCODONE HYDROCHLORIDE 10 MILLIGRAM(S): 5 TABLET ORAL at 00:26

## 2023-08-02 RX ADMIN — INSULIN GLARGINE 50 UNIT(S): 100 INJECTION, SOLUTION SUBCUTANEOUS at 10:25

## 2023-08-02 RX ADMIN — Medication 3 MILLILITER(S): at 10:25

## 2023-08-02 RX ADMIN — OXYCODONE HYDROCHLORIDE 10 MILLIGRAM(S): 5 TABLET ORAL at 13:00

## 2023-08-02 RX ADMIN — Medication 3 MILLILITER(S): at 17:27

## 2023-08-02 RX ADMIN — OXYCODONE HYDROCHLORIDE 5 MILLIGRAM(S): 5 TABLET ORAL at 08:01

## 2023-08-02 RX ADMIN — Medication 12: at 08:44

## 2023-08-02 RX ADMIN — INSULIN GLARGINE 50 UNIT(S): 100 INJECTION, SOLUTION SUBCUTANEOUS at 22:11

## 2023-08-02 RX ADMIN — OXYCODONE HYDROCHLORIDE 10 MILLIGRAM(S): 5 TABLET ORAL at 01:26

## 2023-08-02 RX ADMIN — Medication 8: at 22:11

## 2023-08-02 RX ADMIN — LINEZOLID 600 MILLIGRAM(S): 600 INJECTION, SOLUTION INTRAVENOUS at 17:27

## 2023-08-02 RX ADMIN — Medication 3 MILLILITER(S): at 06:18

## 2023-08-02 RX ADMIN — ONDANSETRON 4 MILLIGRAM(S): 8 TABLET, FILM COATED ORAL at 12:02

## 2023-08-02 RX ADMIN — OXYCODONE HYDROCHLORIDE 5 MILLIGRAM(S): 5 TABLET ORAL at 00:26

## 2023-08-02 RX ADMIN — Medication 4: at 14:36

## 2023-08-02 RX ADMIN — Medication 25 UNIT(S): at 17:28

## 2023-08-02 RX ADMIN — GABAPENTIN 600 MILLIGRAM(S): 400 CAPSULE ORAL at 06:17

## 2023-08-02 RX ADMIN — Medication 25 UNIT(S): at 14:37

## 2023-08-02 RX ADMIN — Medication 1 APPLICATORFUL: at 18:49

## 2023-08-02 RX ADMIN — Medication 10 MILLIGRAM(S): at 14:22

## 2023-08-02 NOTE — DIETITIAN INITIAL EVALUATION ADULT - NSFNSADHERENCEPTAFT_GEN_A_CORE
Displayed good understanding of principles of [facility] CHo DASH diet and reports following prior ot admission with the emphasis liquid intake.

## 2023-08-02 NOTE — PROGRESS NOTE ADULT - PROBLEM SELECTOR PLAN 6
Pt with Hx of abdominal necrotizing fascitis s/p ostomy placement  - Pt with pain around the ostomy, on exam there is a hernia palpated at the ostomy site, tender to touch  - CT Abdomen from 6/21 showing Fat-containing parastomal hernia, unchanged  - Surgery following - no intervention at this time as it is easily reducible  - Continue pain control.

## 2023-08-02 NOTE — CONSULT NOTE ADULT - PROBLEM SELECTOR RECOMMENDATION 9
Type 2 diabetes mellitus with hyperglycemia  - Please continue lantus *** units at bedtime.   - Continue lispro *** units before each meal.  - Continue lispro moderate / low dose sliding scale before meals and at bedtime.  - Patient's fingerstick glucose goal is 100-180 mg/dL.    - For discharge, patient can ***.    - Patient can follow up at discharge with [facility] by calling [phone] to make an appointment.      Case discussed with Dr. Bowden. Primary team updated. Type 2 diabetes mellitus with hyperglycemia  - Please continue lantus 50 units BID  - Continue lispro 25 units before each meal.  - Continue lispro moderate dose sliding scale before meals and at bedtime.  - Patient's fingerstick glucose goal is 100-180 mg/dL.    - For discharge, patient can [place] U 500  60 units TID and admelog 26 units TID.  - Patient can follow up at discharge with Dr Opal Keys [facility] by calling [phone] to make an appointment.      Case discussed with Dr. Bowden. Primary team updated.

## 2023-08-02 NOTE — DISCHARGE NOTE PROVIDER - HOSPITAL COURSE
INCOMPLETE    42 y/o female with PMHx Asthma, CVA (11/2021; residual L>R weakness), PE (4/2023 on Eliquis), uncontrolled DM-2 (on insulin), HTN, HLD, hx abdominal necrotizing fasciitis ( ostomy placement in 11/2021 with intubation from 11-12/2021), hx of frequent UTIs 2/2 to suprapubic catheter, most recent UTI c/b Urosepsis and discharged with midline for Ertapenem, recent cardiac stents now presenting due to a new pimple in the left inguinal area near her left labia and dysuria. In the ED, the pt was given Ertapenem x1. During examination, the pt was found to have purulent cellulitis for which she we started the pt on Zosyn initially. Wound cultures initially were concerning for possible MRSA and the antibiotics were changed to Daptomycin as the pt has an allergy to Vancomycin. Final cultures with sensitivities revealed MSSA and the pt was started on Linezolid 600 PO per ID recommendations. She is now s/p I&D by surgery on 7/30.  For the pt's dysuria, she was initially on Ertapenem given the history of Urosepsis with Klebsiella, was then given 1 dose of Macrobid on 7/31 and d/c due to the pt not having symptoms anymore. The pt was also complaining of pain around her ostomy bag for which surgery was consulted and decided no interventions at this time. The pt was also complaining of chest pain and pressure which has been happening from prior to arrival. She states that she recently had stents placed on 06/26 and was supposed to follow up in 1 week to have intervention for a 70% dLCx/OM1. ECG and troponin were WNL this admission. Cardiology has scheduled the pt for the PCI procedure on 8/1. 44 y/o F, with contrast allergy, PMHx of HTN, HLD, poorly controlled DM-II, CVA (11/2021, residual LE weakness L>R), PE 4/2023 (on Eliquis), Asthma, abd necrotizing fasciitis s/p suprapubic catheter and ostomy in place (11/2021, requiring intubation at that time), and frequent UTIs 2/2 suprapubic catheter w/ recent hospitalization for Urosepsis 5/23-6/1 (Klebsiella and EColi, was discharged w/ L Midline for IV abx), recent admission [facility] 6/20 for chest pain s/p [facility] 06/26/23 w/ NAT x 1 pRCA (85%) and NAT x 1 RPLS (80%); residual dLCx/OM1 70%, mLAD 50% with plan for staged intervention 5 weeks from index who presented to [facility] on 07/28/2023 with complaints of cellulitis of left groin abscess.  Patient underwent successful I&D on 07/30/23.  Patient getting oral abx for abscess. Pt also with asx bacteruria. Patient stable from abscess standpoint and bacteuria standpoint. Patient now s/p staged PCI of Lcx/OM1 (75%). Patient transferred to cardiology/telemetry for further management and is stable for discharge tomorrow 8/2/23.       Problem/Plan - 1: CAD (coronary artery disease).   ·  Plan: Pt recently admitted to cardiology 06/23/23 underwent cardiac cath - 6/26/23: IVUS guided NAT pRCA (85%), NAT RPLS (80%); residual dLCx/OM1 70%, mLAD 50%. Pt was instructed to return for staged PCI of LCx and OM1 only if symptomatic  - Pt complaining of CP, ecg WNL, troponin neg  - ECHO 06/23/23: Mild LVH. Hyperdynamic LVSF. Normal RV/RVSF.  - S/P staged PCI 08/01/2023 NAT x 1 mLCx/OM1 (70%), RCA: patent stents, LM: short segment, MLI, mLAD: 40%, EDP: 29 mmHg (IC team did not want lasix); no EF: R TR radial site stable pulse intact to baseline.  - Aspirin 81mg d/c 08/02/2023  - Continue Plavix 75 mg daily, Eliquis 5 mg BID, Atorvastatin 80 mg daily.       Problem/Plan - 2: Cellulitis.   ·  Plan: Purulent cellulitis started as pimple on her labia, which progressed to swelling, erythema, tenderness from L labia to inner thigh   - S/P I & D with surgery on 07/30/23   - Would culture: Staphylococcus aureus; BCx: NGTD  - S/P Ertapenem 1g In ED; s/p Zosyn from 07/28-07/29; s/p Daptomycin 600mg Q24H and Ertapenem 1g q24h from 7/29-7/30  - F/u E. faecium sensitivities    -Cont Linezolid 600 mg PO q12h - 7d course from I&D (7/30-8/5) - ID signed off  - Patient complaining of vaginal discharge/itching, s/p Fluconazole 200mg PO x 1  - c/w Oxycodone 10mg for breakthrough pain and Oxycodone 5mg for severe pain  - Tylenol q6h PRN Q6H for fever.  - WOUND CARE: Packing removed 8/1/23 per ID 8/2/23 note; Daily ABD. Keep clean and dry.     Problem/Plan - 3: Dysuria.   ·  Plan: Pt reports ongoing hx of dysuria, with + history recurrent UTIS 2/2 suprapubic catheter (which was removed 06/2023)  - Initial UA 06/27/23 - negative of WBCs, leuk esterase and nitrites  - Urine Cx: Staph aureus and enterococcus faecalis with sensitivities  - Received Zosyn and Ertapenem for this symptoms   - Asymptomatic at this time - no abx as per medicine.     Problem/Plan - 4:  Diabetes mellitus.   ·  Plan: LqH5L=53.9  -endo consulted, continue lantus 50 units BID, lispro 25 units prior to each meals (3x per day)  -c/w above as well with MISS  -fingerstick goal is 100-180mg/dL  -for discharge, recommend Humalog U 500 60 units TID and admelog 26 units TID.  - Patient can follow up at discharge with Dr Opal Keys [facility] by calling [phone] to make an appointment.    - Per Endo, would recommend GLP-1 as OP if appropriate, however given current nausea it is not appropriate to start.     Problem/Plan - 5: History of pulmonary embolism.   ·  Plan: History of bilateral pulmonary emboli in segmental and subsegmental branches on the R and subsegmental branches on the L, diagnosed on CT chest during ED visit on 4/7/23. Home meds: Eliquis 5mg BID  -continue Eliquis 5mg BID.     Problem/Plan - 6: Colostomy hernia.   ·  Plan: Pt with Hx of abdominal necrotizing fascitis s/p ostomy placement  - Pt with pain around the ostomy, on exam there is a hernia palpated at the ostomy site, tender to touch  - CT Abdomen from 6/21 showing Fat-containing parastomal hernia, unchanged  - Surgery following - no intervention at this time as it is easily reducible  - Continue pain control.     Problem/Plan - 7: Essential hypertension.   ·  Plan: /73  -continue metoprolol succinate ER 50mg QD.     Problem/Plan - 8: Hyperlipidemia.   ·  Plan: .   Continue with atorvastatin 80mg at bedtime.   consider starting zetia 10mg outpatient    Pt seen and examined at bedside. Labs, telemetry, VS stable and reviewed; of note, hypomagnesemia 1.5 repleted IV and PO today. R radial cath site stable, distal pulse intact, no hematoma. All discharge medications discussed with attending Dr. Neli Fontaine; final Endocrine discharge recommendations confirmed Endo NP Eloina on day of discharge; sent to pt's preferred [facility]. As discussed with Dr. Fontaine, pt is medically stable and optimized for discharge home today; home services of Home PT/ Home OT/ Home Care RN/Wound Care set up by Case Management team.   Cardiac Rehab RX given and referral resources provided.         42 y/o F, with contrast allergy, PMHx of HTN, HLD, poorly controlled DM-II, CVA (11/2021, residual LE weakness L>R), PE 4/2023 (on Eliquis), Asthma, abd necrotizing fasciitis s/p suprapubic catheter and ostomy in place (11/2021, requiring intubation at that time), and frequent UTIs 2/2 suprapubic catheter w/ recent hospitalization for Urosepsis 5/23-6/1 (Klebsiella and EColi, was discharged w/ L Midline for IV abx), recent admission [facility] 6/20 for chest pain s/p [facility] 06/26/23 w/ NAT x 1 pRCA (85%) and NAT x 1 RPLS (80%); residual dLCx/OM1 70%, mLAD 50% with plan for staged intervention 5 weeks from index who presented to [facility] on 07/28/2023 with complaints of cellulitis of left groin abscess.  Patient underwent successful I&D on 07/30/23.  Patient getting oral abx for abscess. Pt also with asx bacteruria. Patient stable from abscess standpoint and bacteuria standpoint. Patient now s/p staged PCI of Lcx/OM1 (75%). Patient transferred to cardiology/telemetry for further management and is stable for discharge tomorrow 8/2/23.       Problem/Plan - 1: CAD (coronary artery disease).   ·  Plan: Pt recently admitted to cardiology 06/23/23 underwent cardiac cath - 6/26/23: IVUS guided NAT pRCA (85%), NAT RPLS (80%); residual dLCx/OM1 70%, mLAD 50%. Pt was instructed to return for staged PCI of LCx and OM1 only if symptomatic  - Pt complaining of CP, ecg WNL, troponin neg  - ECHO 06/23/23: Mild LVH. Hyperdynamic LVSF. Normal RV/RVSF.  - S/P staged PCI 08/01/2023 NAT x 1 mLCx/OM1 (70%), RCA: patent stents, LM: short segment, MLI, mLAD: 40%, EDP: 29 mmHg (IC team did not want lasix); no EF: R TR radial site stable pulse intact to baseline.  - Aspirin 81mg d/c 08/02/2023  - Continue Plavix 75 mg daily, Eliquis 5 mg BID, Atorvastatin 80 mg daily.       Problem/Plan - 2: Cellulitis.   ·  Plan: Purulent cellulitis started as pimple on her labia, which progressed to swelling, erythema, tenderness from L labia to inner thigh   - S/P I & D with surgery on 07/30/23   - Would culture: Staphylococcus aureus; BCx: NGTD  - S/P Ertapenem 1g In ED; s/p Zosyn from 07/28-07/29; s/p Daptomycin 600mg Q24H and Ertapenem 1g q24h from 7/29-7/30  - F/u E. faecium sensitivities    -Cont Linezolid 600 mg PO q12h - 7d course from I&D (7/30-8/5) - ID signed off  - Patient complaining of vaginal discharge/itching, s/p Fluconazole 200mg PO x 1  - c/w Oxycodone 10mg for breakthrough pain and Oxycodone 5mg for severe pain  - Tylenol q6h PRN Q6H for fever.  - WOUND CARE: Packing removed 8/1/23 per ID 8/2/23 note; Daily ABD. Keep clean and dry.     Problem/Plan - 3: Dysuria.   ·  Plan: Pt reports ongoing hx of dysuria, with + history recurrent UTIS 2/2 suprapubic catheter (which was removed 06/2023)  - Initial UA 06/27/23 - negative of WBCs, leuk esterase and nitrites  - Urine Cx: Staph aureus and enterococcus faecalis with sensitivities  - Received Zosyn and Ertapenem for this symptoms   - Asymptomatic at this time - no abx as per medicine.     Problem/Plan - 4:  Diabetes mellitus.   ·  Plan: GpZ5T=99.9  -endo consulted, continue lantus 50 units BID, lispro 25 units prior to each meals (3x per day)  -c/w above as well with MISS  -fingerstick goal is 100-180mg/dL  -for discharge, recommend Humalog U 500 60 units TID and admelog 26 units TID.  - Patient can follow up at discharge with Dr Opal Keys [facility] by calling [phone] to make an appointment.    - Per Endo, would recommend GLP-1 as OP if appropriate, however given current nausea it is not appropriate to start.     Problem/Plan - 5: History of pulmonary embolism.   ·  Plan: History of bilateral pulmonary emboli in segmental and subsegmental branches on the R and subsegmental branches on the L, diagnosed on CT chest during ED visit on 4/7/23. Home meds: Eliquis 5mg BID  -continue Eliquis 5mg BID.     Problem/Plan - 6: Colostomy hernia.   ·  Plan: Pt with Hx of abdominal necrotizing fascitis s/p ostomy placement  - Pt with pain around the ostomy, on exam there is a hernia palpated at the ostomy site, tender to touch  - CT Abdomen from 6/21 showing Fat-containing parastomal hernia, unchanged  - Surgery following - no intervention at this time as it is easily reducible  - Continue pain control.     Problem/Plan - 7: Essential hypertension.   ·  Plan: /73  -continue metoprolol succinate ER 50mg QD.     Problem/Plan - 8: Hyperlipidemia.   ·  Plan: .   Continue with atorvastatin 80mg at bedtime.   consider starting zetia 10mg outpatient    Pt seen and examined at bedside. Labs, telemetry, VS stable and reviewed; of note, hypomagnesemia 1.5 repleted IV and PO today. R radial cath site stable, distal pulse intact, no hematoma. All discharge medications discussed with attending Dr. Neli Fontaine; final Endocrine discharge recommendations confirmed Endo NP Eloina on day of discharge; sent to pt's preferred [facility]. As discussed with Dr. Fontaine, pt is medically stable and optimized for discharge home today; home services of Home PT/ Home OT/ Home Care RN/Wound Care set up by Case Management team.   Cardiac Rehab RX given and referral resources provided.        Attending Addendum 8/3/23     Patient is 44 yo woman with CAD s/p PCI in 06/23 in setting of NSTEMI with residual disease, IDDM, CVA, SPC, abdominal wall necrotizing fasciitis, PEs on AC, admitted with purulent collection in her left groin s/p I&D with Cx positive for MSSA and E. Faecium. followed by ID, with course notable for uncontrolled hyperglycemia, stable Angina in patient planned for staged PCI of the LCx/OM1, now s/p Successful PCI of the OM1, clinically optimized and ready for Discharge    Physical Exam   CV: RRR, Lungs: CTABL; Right Ulnar Access site stable; Left groin stable;     - CAD s/p PCI: Given patient on Eliquis for Hx of DVT/PE, cont Eliquis and Plavix 75 mg Po Daily.Pt is aware that should Eliquis be discontinued by her primary care for completion of DVT/PE treatment, she should resume taking ASA 81 mg po daily along with Plavix. Cont with high dose statin,lipitor 80 mg po daily. Cont with Toprol 50 mg po daily   - Groin abscess; Patient s/p I&D, Cx positive for MSSA and E. Faecium, now transitioned to PO Linezolid with ID. Cont Linezolid 600 mg PO q12h with Anticipated 7d course from I&D (7/30-8/5)  - DM: Persistently Hyperglycemic throughout hospitalization. Endo consulted with final recommendation for  Anticipated DC on 8/3. Follow up established with Dr Joseph from Endocrinology.   - Patient has outpatient Cardiology follow up established with Dr Oswald in the [place] who participates in her Insurance plan.

## 2023-08-02 NOTE — CONSULT NOTE ADULT - SUBJECTIVE AND OBJECTIVE BOX
HISTORY OF PRESENT ILLNESS  HASMUKH AGUILERA is a 43y Female with a past medical history of Asthma, CVA (11/2021; residual L>R weakness), PE (4/2023 on Eliquis), uncontrolled DM-2 (on insulin), HTN, HLD, hx abdominal necrotizing fasciitis ( ostomy placement in 11/2021 with intubation from 11-12/2021), hx of frequent UTIs 2/2 to suprapubic catheter - removed in June 2023, most recent UTI c/b Urosepsis and discharged with midline for Ertapenem, recent cardiac stents now presenting due to a new pimple in the left inguinal area near her left labia and dysuria. In the ED, the pt was given Ertapenem x1. During examination, the pt was found to have purulent cellulitis for which she we started the pt on Zosyn initially. Wound cultures initially were concerning for possible MRSA and the antibiotics were changed to Daptomuycin as the pt has an allergy to Vancomycin. Final cultures with sensitivities revealed MSSA and the pt was started on Linezolid 600 PO per ID recommendations. She is now s/p I&D by surgery on 7/30.  For the pt's dysuria, she was initially on Ertapenem given the history of Urosepsis with Klebsiella, was then given 1 dose of Macrobid on 7/31 and d/c due to the pt not having symptoms anymore. The pt was also complaining of pain around her ostomy bag for which surgery was consulted and decided no interventions at this time. The pt was also complaining of chest pain and pressure which has been happening from prior to arrival. She states that she recently had stents placed on 06/26 and was supposed to follow up in 1 week to have intervention for a 70% dLCx/OM1. ECG and troponin were WNL this admission. Cardiology has scheduled the pt for the PCI procedure on 8/1. S/P staged PCI 08/01/2023 NAT x 1 mLCx/OM1 (70%), RCA: patent stents, LM: short segment, MLI, mLAD: 40%, EDP: 29 mmHg (IC team did not want lasix); no EF: R TR at 8 PM.  Premedicated with solucortef 200mg for contrast allergy on 8/1 at 2PM.    In the ED:  Vitals: T 98.2, HR 75, /76, 98% on RA  Labs: WBC 11.64, Hgb 13, plt 455, K 3.2, Cl 93, lactate 3.7 --> 2.9 --> 1.5  UA: small blood, <5 WBCs, >10RBCs, no bacteria  CXR: no acute infiltrates  EKG: pending  Interventions: Ertapenem 1g, NS 2L IVB, 1L LR, Ofirmev 1g, Dilaudid 1mg IVPx2, Mg 2g, Zofran 4mg IVP, potassium 80meq   (28 Jul 2023 04:16)    Last admission: Lantus 50BID and lispro 25 with meals - labile form   Discharged back on home dose: U500 50 TID and admelog 30 TID with meals.    CAPILLARY BLOOD GLUCOSE & INSULIN RECEIVED  258 mg/dL (08-01 @ 13:38)  189 mg/dL (08-01 @ 19:04)  242 mg/dL (08-01 @ 20:38)  368 mg/dL (08-01 @ 21:57)  534 mg/dL (08-02 @ 05:30)  457 mg/dL (08-02 @ 08:28)  387 mg/dL (08-02 @ 09:28)      DIABETES HISTORY  Dr Joseph is her endocrinologist, she has not followed up with her since last discharge.    DIABETES HISTORY  T2DM history:  -Dx in 2005.   -2007: started metformin 1000 mg BID.   -2009: was pregnant and started on glyburide- did not require insulin for pregnancy.   - October 2020: began seeing Dr. Joseph. Eventually, insulin regimen changed to U500  -During a previous admission in July 2021, she was receiving Lantus 75u twice daily and Lispro 40-65 units with each meal.   -last recorded recommended outpatient regimen (clinic note Feb 2023): U500 Humulin R 100 units TID with meals. If glucose levels do not improve, add Admelog 30 units TID with meals. Prior to admission.     ALLERGIES  vancomycin (Anaphylaxis; Hives)  shellfish. (Hives; Anaphylaxis)  iodine containing compounds (Hives; Anaphylaxis)  Bactrim I.V. (Rash (Mod to Severe))  clindamycin (Pruritus)  amoxicillin (Short breath; Rash)  penicillin (Hives)  fish (Hives; Urticaria)    CURRENT MEDICATIONS  acetaminophen     Tablet .. 650 milliGRAM(s) Oral every 6 hours PRN  albuterol/ipratropium for Nebulization 3 milliLiter(s) Nebulizer every 6 hours  atorvastatin 80 milliGRAM(s) Oral at bedtime  clopidogrel Tablet 75 milliGRAM(s) Oral daily  clotrimazole 2% Vaginal Cream 1 Applicatorful Vaginal daily  dextrose 5%. 1000 milliLiter(s) IV Continuous <Continuous>  dextrose 5%. 1000 milliLiter(s) IV Continuous <Continuous>  dextrose 50% Injectable 25 Gram(s) IV Push once  dextrose 50% Injectable 12.5 Gram(s) IV Push once  dextrose 50% Injectable 25 Gram(s) IV Push once  dextrose Oral Gel 15 Gram(s) Oral once PRN  diphenhydrAMINE Injectable 50 milliGRAM(s) IV Push once PRN  gabapentin 600 milliGRAM(s) Oral every 8 hours  glucagon  Injectable 1 milliGRAM(s) IntraMuscular once  insulin glargine Injectable (LANTUS) 50 Unit(s) SubCutaneous at bedtime  insulin glargine Injectable (LANTUS) 50 Unit(s) SubCutaneous every morning  insulin lispro (ADMELOG) corrective regimen sliding scale   SubCutaneous Before meals and at bedtime  insulin lispro Injectable (ADMELOG) 25 Unit(s) SubCutaneous three times a day before meals  linezolid    Tablet 600 milliGRAM(s) Oral every 12 hours  metoprolol succinate ER 50 milliGRAM(s) Oral every 24 hours  oxyCODONE    IR 5 milliGRAM(s) Oral every 6 hours PRN  oxyCODONE    IR 10 milliGRAM(s) Oral every 12 hours PRN  pantoprazole    Tablet 40 milliGRAM(s) Oral before breakfast  sodium chloride 0.9%. 500 milliLiter(s) IV Continuous <Continuous>    REVIEW OF SYSTEMS  Constitutional:  Negative fever, chills or loss of appetite.  Eyes:  Negative blurry vision or double vision.  Cardiovascular:  Negative for chest pain or palpitations.  Respiratory:  Negative for cough, wheezing, or shortness of breath.   Gastrointestinal:  Negative for nausea, vomiting, diarrhea, constipation, or abdominal pain.  Genitourinary:  Negative frequency, urgency or dysuria.  Neurologic:  No headache, confusion, dizziness, lightheadedness.    PHYSICAL EXAM  Vital Signs Last 24 Hrs  T(C): 36.7 (02 Aug 2023 04:58), Max: 37.2 (01 Aug 2023 20:35)  T(F): 98 (02 Aug 2023 04:58), Max: 98.9 (01 Aug 2023 20:35)  HR: 118 (02 Aug 2023 04:58) (88 - 118)  BP: 105/59 (02 Aug 2023 04:58) (105/59 - 143/73)  BP(mean): 119 (02 Aug 2023 04:58) (101 - 119)  RR: 18 (02 Aug 2023 04:58) (18 - 18)  SpO2: 98% (02 Aug 2023 04:58) (97% - 98%)    Parameters below as of 02 Aug 2023 04:58  Patient On (Oxygen Delivery Method): room air    Constitutional: Awake, alert, in no acute distress.   HEENT: Normocephalic, atraumatic, NAOMI, no proptosis or lid retraction.   Neck: supple, no acanthosis, no thyromegaly or palpable thyroid nodules.  Respiratory: Lungs clear to ausculation bilaterally.   Cardiovascular: regular rhythm, normal S1 and S2, no audible murmurs.   GI: soft, non-tender, non-distended, bowel sounds present, no masses appreciated.  Extremities: No lower extremity edema, peripheral pulses present.   Skin: no rashes.   Psychiatric: AAO x 3. Normal affect/mood.     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 In the ED, the pt was given Ertapenem x1. During examination, the pt was found to have purulent cellulitis for which she we started the pt on Zosyn initially. Wound cultures initially were concerning for possible MRSA and the antibiotics were changed to Daptomuycin as the pt has an allergy to Vancomycin. Final cultures with sensitivities revealed MSSA and the pt was started on Linezolid 600 PO per ID recommendations. She is now s/p I&D by surgery on 7/30.  For the pt's dysuria, she was initially on Ertapenem given the history of Urosepsis with Klebsiella, was then given 1 dose of Macrobid on 7/31 and d/c due to the pt not having symptoms anymore. The pt was also complaining of pain around her ostomy bag for which surgery was consulted and decided no interventions at this time. The pt was also complaining of chest pain and pressure which has been happening from prior to arrival. She states that she recently had stents placed on 06/26 and was supposed to follow up in 1 week to have intervention for a 70% dLCx/OM1. ECG and troponin were WNL this admission. Cardiology has scheduled the pt for the PCI procedure on 8/1. S/P staged PCI 08/01/2023 NAT x 1 mLCx/OM1 (70%), RCA: patent stents, LM: short segment, MLI, mLAD: 40%, EDP: 29 mmHg (IC team did not want lasix); no EF: R TR at 8 PM.  Premedicated with solucortef 200mg for contrast allergy on 8/1 at 2PM.    In the ED:  Vitals: T 98.2, HR 75, /76, 98% on RA  Labs: WBC 11.64, Hgb 13, plt 455, K 3.2, Cl 93, lactate 3.7 --> 2.9 --> 1.5  UA: small blood, <5 WBCs, >10RBCs, no bacteria  CXR: no acute infiltrates  EKG: pending  Interventions: Ertapenem 1g, NS 2L IVB, 1L LR, Ofirmev 1g, Dilaudid 1mg IVPx2, Mg 2g, Zofran 4mg IVP, potassium 80meq   (28 Jul 2023 04:16)    Endocrine consulted for diabetes management.   Last admission: Lantus 50BID and lispro 25 with meals - labile form   Discharged back on home dose: U500 50 TID and admelog 30 TID with meals. She reports she is now taking U500 60 TID and admelog 26 TID with meals. Reports she has some good days of glucose in control <200.  She continues to have nausea, pain, dry mouth, abd pain at hernia, and intermittent dizziness.. Her meals are off schedule from nausea. She ordered in food last night. She is voiding freely, but with episodes of incontinence. She is doing keigel episodes at home. She is afebrile with no leukocytosis. She is tearful and distressed about situation, so much so that it is hard to redirect her to discuss blood glucose management strategies.  Prior to steroids this admission, fasting FSG 300s.    CAPILLARY BLOOD GLUCOSE & INSULIN RECEIVED  226 mg/dL (07-31 @ bedtime) - Lantus 50 + lispro 4. NPO at MN  189 mg/dL (08-01 @ AM) - Lantus 50 + lispro 2.  258 mg/dL (08-01 @ 13:38) - Lispro 6  189 mg/dL (08-01 @ 19:04) - Lispro 25+4. 7 garlic knots and snapple  242 mg/dL (08-01 @ 20:38)  368 mg/dL (08-01 @ 21:57)   534 mg/dL (08-02 @ 05:30)  457 mg/dL (08-02 @ 08:28) - Lantus 50 + lispro 25+12. Didn't eat breakfast.  387 mg/dL (08-02 @ 09:28)  260mg/dL (08-02 @ lunch) - was nauseous with lunch tray in front of her.      DIABETES HISTORY  Dr Joseph is her endocrinologist, she has not followed up since Feb 2023.    DIABETES HISTORY  T2DM history:  -Dx in 2005.   -2007: started metformin 1000 mg BID.   -2009: was pregnant and started on glyburide- did not require insulin for pregnancy.   - October 2020: began seeing Dr. Joseph. Eventually, insulin regimen changed to U500  -During a previous admission in July 2021, she was receiving Lantus 75u twice daily and Lispro 40-65 units with each meal.   -last recorded recommended outpatient regimen (clinic note Feb 2023): U500 Humulin R 100 units TID with meals. If glucose levels do not improve, add Admelog 30 units TID with meals. Prior to admission.     ALLERGIES  vancomycin (Anaphylaxis; Hives)  shellfish. (Hives; Anaphylaxis)  iodine containing compounds (Hives; Anaphylaxis)  Bactrim I.V. (Rash (Mod to Severe))  clindamycin (Pruritus)  amoxicillin (Short breath; Rash)  penicillin (Hives)  fish (Hives; Urticaria)    CURRENT MEDICATIONS  acetaminophen     Tablet .. 650 milliGRAM(s) Oral every 6 hours PRN  albuterol/ipratropium for Nebulization 3 milliLiter(s) Nebulizer every 6 hours  atorvastatin 80 milliGRAM(s) Oral at bedtime  clopidogrel Tablet 75 milliGRAM(s) Oral daily  clotrimazole 2% Vaginal Cream 1 Applicatorful Vaginal daily  dextrose 5%. 1000 milliLiter(s) IV Continuous <Continuous>  dextrose 5%. 1000 milliLiter(s) IV Continuous <Continuous>  dextrose 50% Injectable 25 Gram(s) IV Push once  dextrose 50% Injectable 12.5 Gram(s) IV Push once  dextrose 50% Injectable 25 Gram(s) IV Push once  dextrose Oral Gel 15 Gram(s) Oral once PRN  diphenhydrAMINE Injectable 50 milliGRAM(s) IV Push once PRN  gabapentin 600 milliGRAM(s) Oral every 8 hours  glucagon  Injectable 1 milliGRAM(s) IntraMuscular once  insulin glargine Injectable (LANTUS) 50 Unit(s) SubCutaneous at bedtime  insulin glargine Injectable (LANTUS) 50 Unit(s) SubCutaneous every morning  insulin lispro (ADMELOG) corrective regimen sliding scale   SubCutaneous Before meals and at bedtime  insulin lispro Injectable (ADMELOG) 25 Unit(s) SubCutaneous three times a day before meals  linezolid    Tablet 600 milliGRAM(s) Oral every 12 hours  metoprolol succinate ER 50 milliGRAM(s) Oral every 24 hours  oxyCODONE    IR 5 milliGRAM(s) Oral every 6 hours PRN  oxyCODONE    IR 10 milliGRAM(s) Oral every 12 hours PRN  pantoprazole    Tablet 40 milliGRAM(s) Oral before breakfast  sodium chloride 0.9%. 500 milliLiter(s) IV Continuous <Continuous>    REVIEW OF SYSTEMS  Constitutional:  Negative fever, chills or loss of appetite.  Eyes:  Negative blurry vision or double vision.  Cardiovascular:  Negative for chest pain or palpitations.  Respiratory:  Negative for cough, wheezing, or shortness of breath.   Gastrointestinal:  Negative for vomiting, diarrhea, constipation, (+) nausea and abdominal pain.  Genitourinary:  Negative frequency, urgency or dysuria.  Neurologic:  No headache, confusion, dizziness, lightheadedness.    PHYSICAL EXAM  Vital Signs Last 24 Hrs  T(C): 36.7 (02 Aug 2023 04:58), Max: 37.2 (01 Aug 2023 20:35)  T(F): 98 (02 Aug 2023 04:58), Max: 98.9 (01 Aug 2023 20:35)  HR: 118 (02 Aug 2023 04:58) (88 - 118)  BP: 105/59 (02 Aug 2023 04:58) (105/59 - 143/73)  BP(mean): 119 (02 Aug 2023 04:58) (101 - 119)  RR: 18 (02 Aug 2023 04:58) (18 - 18)  SpO2: 98% (02 Aug 2023 04:58) (97% - 98%)    Parameters below as of 02 Aug 2023 04:58  Patient On (Oxygen Delivery Method): room air    Constitutional: Awake, alert, in no acute distress.   HEENT: Normocephalic, atraumatic, NAOMI, no proptosis or lid retraction.   Neck: supple, no acanthosis, no thyromegaly or palpable thyroid nodules.  Respiratory: Lungs clear to ausculation bilaterally.   Cardiovascular: regular rhythm, normal S1 and S2, no audible murmurs.   GI: soft, non-tender, non-distended, bowel sounds present, no masses appreciated. + ostomy  Extremities: No lower extremity edema, peripheral pulses present.   Skin: no rashes.   Psychiatric: AAO x 3. Anxiety and tearful.    LABS  CBC - WBC/HGB/HTC/PLT: 12.61/10.8/36.6/486 (08-02-23)  BMP: Na/K/Cl/Bicarb/BUN/Cr/Gluc: 133/4.9/102/18/18/0.72/534 (08-02-23)  Anion Gap: 13 (08-02-23)  eGFR: 106 (08-02-23)  Calcium: 8.8 (08-02-23)  Phosphorus: 2.0 (08-02-23)  Magnesium: 1.8 (08-02-23)  LFT - Alb/Tprot/Tbili/Dbili/AlkPhos/ALT/AST: 2.8/--/<0.2/--/148/9/11 (08-01-23)  PT/aPTT/INR: 11.1/31.3/0.97 (08-01-23)  Thyroid Stimulating Hormone, Serum: 0.305 (06-21-23)

## 2023-08-02 NOTE — DIETITIAN INITIAL EVALUATION ADULT - PERTINENT LABORATORY DATA
08-02    133<L>  |  102  |  18  ----------------------------<  534<HH>  4.9   |  18<L>  |  0.72    Ca    8.8      02 Aug 2023 05:30  Phos  2.0     08-02  Mg     1.8     08-02    TPro  6.3  /  Alb  2.8<L>  /  TBili  <0.2  /  DBili  x   /  AST  11  /  ALT  9<L>  /  AlkPhos  148<H>  08-01  POCT Blood Glucose.: 225 mg/dL (08-02-23 @ 13:41)  A1C with Estimated Average Glucose Result: 11.9 % (06-21-23 @ 07:24)  A1C with Estimated Average Glucose Result: 12.2 % (04-28-23 @ 05:24)  A1C with Estimated Average Glucose Result: 12.5 % (03-20-23 @ 05:30)

## 2023-08-02 NOTE — PROGRESS NOTE ADULT - ASSESSMENT
44 y/o F, with contrast allergy, PMHx of HTN, HLD, poorly controlled DM-II, CVA (11/2021, residual LE weakness L>R), PE 4/2023 (on Eliquis), Asthma, abd necrotizing fasciitis s/p suprapubic catheter and ostomy in place (11/2021, requiring intubation at that time), and frequent UTIs 2/2 suprapubic catheter w/ recent hospitalization for Urosepsis 5/23-6/1 (Klebsiella and EColi, was discharged w/ L Midline for IV abx), recent admission [facility] 6/20 for chest pain s/p C 06/26/23 w/ NAT x 1 pRCA (85%) and NAT x 1 RPLS (80%); residual dLCx/OM1 70%, mLAD 50% with plan for staged intervention 5 weeks from index who presented to [facility] on 07/28/2023 with complaints of cellulitis of left groin abscess.  Patient underwent successful I&D on 07/30/23.  Patient undergoing IV antibitoic tx for abscess. Pt also with asx bacteruria. Patient stable from abscess standpoint and bacteuria standpoint. Patient now s/p staged PCI of Lcx/OM1 (75%). Patient transferred to cardiology/telemetry for further management and is stable for discharge tomorrow 8/2/23.      42 y/o F, with contrast allergy, PMHx of HTN, HLD, poorly controlled DM-II, CVA (11/2021, residual LE weakness L>R), PE 4/2023 (on Eliquis), Asthma, abd necrotizing fasciitis s/p suprapubic catheter and ostomy in place (11/2021, requiring intubation at that time), and frequent UTIs 2/2 suprapubic catheter w/ recent hospitalization for Urosepsis 5/23-6/1 (Klebsiella and EColi, was discharged w/ L Midline for IV abx), recent admission [facility] 6/20 for chest pain s/p C 06/26/23 w/ NAT x 1 pRCA (85%) and NAT x 1 RPLS (80%); residual dLCx/OM1 70%, mLAD 50% with plan for staged intervention 5 weeks from index who presented to [facility] on 07/28/2023 with complaints of cellulitis of left groin abscess.  Patient underwent successful I&D on 07/30/23.  Patient getting oral abx for abscess. Pt also with asx bacteruria. Patient stable from abscess standpoint and bacteuria standpoint. Patient now s/p staged PCI of Lcx/OM1 (75%). Patient transferred to cardiology/telemetry for further management and is stable for discharge tomorrow 8/2/23.

## 2023-08-02 NOTE — OCCUPATIONAL THERAPY INITIAL EVALUATION ADULT - PHYSICAL ASSIST/NONPHYSICAL ASSIST: SIT/STAND, REHAB EVAL
Pt adamantly declining use of L AFO despite max education provided about concerns for safety with ambulation without L AFO. Pt continued to decline./verbal cues/nonverbal cues (demo/gestures)/1 person assist

## 2023-08-02 NOTE — OCCUPATIONAL THERAPY INITIAL EVALUATION ADULT - LIGHT TOUCH SENSATION, LLE, REHAB EVAL
pt reports "pins and needles" on medial side on LLE, reports "stabbing pain" along lateral side of LLE

## 2023-08-02 NOTE — DIETITIAN INITIAL EVALUATION ADULT - PERTINENT MEDS FT
MEDICATIONS  (STANDING):  albuterol/ipratropium for Nebulization 3 milliLiter(s) Nebulizer every 6 hours  atorvastatin 80 milliGRAM(s) Oral at bedtime  clopidogrel Tablet 75 milliGRAM(s) Oral daily  clotrimazole 2% Vaginal Cream 1 Applicatorful Vaginal daily  dextrose 5%. 1000 milliLiter(s) (100 mL/Hr) IV Continuous <Continuous>  dextrose 5%. 1000 milliLiter(s) (50 mL/Hr) IV Continuous <Continuous>  dextrose 50% Injectable 25 Gram(s) IV Push once  dextrose 50% Injectable 12.5 Gram(s) IV Push once  dextrose 50% Injectable 25 Gram(s) IV Push once  gabapentin 600 milliGRAM(s) Oral every 8 hours  glucagon  Injectable 1 milliGRAM(s) IntraMuscular once  insulin glargine Injectable (LANTUS) 50 Unit(s) SubCutaneous at bedtime  insulin glargine Injectable (LANTUS) 50 Unit(s) SubCutaneous every morning  insulin lispro (ADMELOG) corrective regimen sliding scale   SubCutaneous Before meals and at bedtime  insulin lispro Injectable (ADMELOG) 25 Unit(s) SubCutaneous three times a day before meals  linezolid    Tablet 600 milliGRAM(s) Oral every 12 hours  metoprolol succinate ER 50 milliGRAM(s) Oral every 24 hours  pantoprazole    Tablet 40 milliGRAM(s) Oral before breakfast  sodium chloride 0.9%. 500 milliLiter(s) (75 mL/Hr) IV Continuous <Continuous>    MEDICATIONS  (PRN):  acetaminophen     Tablet .. 650 milliGRAM(s) Oral every 6 hours PRN Temp greater or equal to 38C (100.4F), Mild Pain (1 - 3)  dextrose Oral Gel 15 Gram(s) Oral once PRN Blood Glucose LESS THAN 70 milliGRAM(s)/deciliter  diphenhydrAMINE Injectable 50 milliGRAM(s) IV Push once PRN Allergy symptoms  oxyCODONE    IR 5 milliGRAM(s) Oral every 6 hours PRN Severe Pain (7 - 10)  oxyCODONE    IR 10 milliGRAM(s) Oral every 12 hours PRN breakthrough pain

## 2023-08-02 NOTE — DISCHARGE NOTE PROVIDER - CARE PROVIDER_API CALL
Dr. Rowdy Oswald [address] [address]  [phone].,   Phone: (   )    -  Fax: (   )    -  Scheduled Appointment: 08/08/2023 12:00 PM    Pelon Sánchez  Internal Medicine  [facility], [address] [place] [address]  Phone: [phone]  Fax: [phone]  Scheduled Appointment: 08/09/2023 08:15 AM

## 2023-08-02 NOTE — OCCUPATIONAL THERAPY INITIAL EVALUATION ADULT - GENERAL OBSERVATIONS, REHAB EVAL
OT IE Completed. Pt's RN Kwasi cleared pt for OT. Pt received seated in bedside chair, +tele, +ostomy, +heplock, +room air, NAD, agreeable to OT. Pt tolerated session fairly, however, tachycardic up to 135 bpm during activity, RN aware. Pt left seated in bedside chair, all needs in reach, RN aware.

## 2023-08-02 NOTE — OCCUPATIONAL THERAPY INITIAL EVALUATION ADULT - DIAGNOSIS, OT EVAL
Pt presents with L foot drop (chronic), BUE weakness, impaired functional endurance, decreased standing balance, decreased coordination and impaired b/l hands and LLE sensation impacting her ability to independently complete ADLs, functional transfers, and functional mobility. Check here if all serologies below were negative, non-reactive or immune. Otherwise select appropriate status.

## 2023-08-02 NOTE — CONSULT NOTE ADULT - SUBJECTIVE AND OBJECTIVE BOX
*** INCOMPLETE ***    Patient is a 43y old  Female who presents with a chief complaint of dysuria (01 Aug 2023 11:10)      HPI/HOSPITAL COURSE:  HPI:  43F with PMHx Asthma, CVA (2021; residual L>R weakness), PE (2023 on Eliquis), uncontrolled DM-2 (on insulin), HTN, HLD, hx abdominal necrotizing fasciitis ( ostomy placement in 2021 with intubation from -2021), hx of frequent UTIs 2/2 to suprapubic catheter, most recent UTI c/b Urosepsis and discharged with midline for Ertapenem, recent cardiac stents now presenting due to a new pimple in the left inguinal area near her left labia and dysuria. In the ED, the pt was given Ertapenem x1. During examination, the pt was found to have purulent cellulitis for which she we started the pt on Zosyn initially. Wound cultures initially were concerning for possible MRSA and the antibiotics were changed to Daptomuycin as the pt has an allergy to Vancomycin. Final cultures with sensitivities revealed MSSA and the pt was started on Linezolid 600 PO per ID recommendations. She is now s/p I&D by surgery on .  For the pt's dysuria, she was initially on Ertapenem given the history of Urosepsis with Klebsiella, was then given 1 dose of Macrobid on  and d/c due to the pt not having symptoms anymore. The pt was also complaining of pain around her ostomy bag for which surgery was consulted and decided no interventions at this time. The pt was also complaining of chest pain and pressure which has been happening from prior to arrival. She states that she recently had stents placed on  and was supposed to follow up in 1 week to have intervention for a 70% dLCx/OM1. ECG and troponin were WNL this admission. Cardiology has scheduled the pt for the PCI procedure on . S/P staged PCI 2023 NAT x 1 mLCx/OM1 (70%), RCA: patent stents, LM: short segment, MLI, mLAD: 40%, EDP: 29 mmHg (IC team did not want lasix); no EF: R TR at 8 PM.    In the ED:  Vitals: T 98.2, HR 75, /76, 98% on RA  Labs: WBC 11.64, Hgb 13, plt 455, K 3.2, Cl 93, lactate 3.7 --> 2.9 --> 1.5  UA: small blood, <5 WBCs, >10RBCs, no bacteria  CXR: no acute infiltrates  EKG: pending  Interventions: Ertapenem 1g, NS 2L IVB, 1L LR, Ofirmev 1g, Dilaudid 1mg IVPx2, Mg 2g, Zofran 4mg IVP, potassium 80meq   (2023 04:16)        INTERVAL EVENTS:    SUBJECTIVE HPI: Patient seen and examined at bedside. Patient resting comfortably, no complaints at this time. Patient denies: fever, chills, weakness, dizziness, headaches, changes in vision, chest pain, palpitations, shortness of breath, cough, N/V, diarrhea or constipation, dysuria, LE edema. ROS otherwise negative.      PAST MEDICAL & SURGICAL HISTORY:  Hyperlipidemia      Abdominal hernia      Pre-eclampsia      Essential hypertension      Obesity      Pulmonary embolism      Type 2 diabetes mellitus      History of necrotizing fasciitis      History of creation of ostomy      Suprapubic catheter      H/O: CVA (cerebrovascular accident)      Sepsis, unspecified organism      H/O LEEP      History of D&C      H/O:       H/O ventral hernia repair      H/O exploratory laparotomy      History of creation of ostomy          FAMILY HISTORY:  FH: diabetes mellitus  father and mother    FH: hypertension  father and mother        SOCIAL HISTORY:     -Cigarrettes:     -Alcohol:     -Ilicit Drug Use:    Home Medications:  acetaminophen 325 mg oral tablet: 3 tab(s) orally every 8 hours (2023 17:49)  Admelog 100 units/mL injectable solution: 30 unit(s) injectable 3 times a day (with meals) (2023 17:09)  gabapentin 600 mg oral tablet: 1 tab(s) orally 3 times a day (2023 17:49)  HumuLIN R (Concentrated) 500 units/mL subcutaneous solution: 50 unit(s) subcutaneous 3 times a day (2023 17:09)  ibuprofen 800 mg oral tablet: 1 tab(s) orally 3 times a day (2023 17:49)      MEDICATIONS  (STANDING):  albuterol/ipratropium for Nebulization 3 milliLiter(s) Nebulizer every 6 hours  atorvastatin 80 milliGRAM(s) Oral at bedtime  clopidogrel Tablet 75 milliGRAM(s) Oral daily  clotrimazole 2% Vaginal Cream 1 Applicatorful Vaginal daily  dextrose 5%. 1000 milliLiter(s) (50 mL/Hr) IV Continuous <Continuous>  dextrose 5%. 1000 milliLiter(s) (100 mL/Hr) IV Continuous <Continuous>  dextrose 50% Injectable 25 Gram(s) IV Push once  dextrose 50% Injectable 25 Gram(s) IV Push once  dextrose 50% Injectable 12.5 Gram(s) IV Push once  gabapentin 600 milliGRAM(s) Oral every 8 hours  glucagon  Injectable 1 milliGRAM(s) IntraMuscular once  insulin glargine Injectable (LANTUS) 50 Unit(s) SubCutaneous at bedtime  insulin glargine Injectable (LANTUS) 50 Unit(s) SubCutaneous every morning  insulin lispro (ADMELOG) corrective regimen sliding scale   SubCutaneous Before meals and at bedtime  insulin lispro Injectable (ADMELOG) 25 Unit(s) SubCutaneous three times a day before meals  linezolid    Tablet 600 milliGRAM(s) Oral every 12 hours  metoprolol succinate ER 50 milliGRAM(s) Oral every 24 hours  pantoprazole    Tablet 40 milliGRAM(s) Oral before breakfast  sodium chloride 0.9%. 500 milliLiter(s) (75 mL/Hr) IV Continuous <Continuous>    MEDICATIONS  (PRN):  acetaminophen     Tablet .. 650 milliGRAM(s) Oral every 6 hours PRN Temp greater or equal to 38C (100.4F), Mild Pain (1 - 3)  dextrose Oral Gel 15 Gram(s) Oral once PRN Blood Glucose LESS THAN 70 milliGRAM(s)/deciliter  diphenhydrAMINE Injectable 50 milliGRAM(s) IV Push once PRN Allergy symptoms  oxyCODONE    IR 5 milliGRAM(s) Oral every 6 hours PRN Severe Pain (7 - 10)  oxyCODONE    IR 10 milliGRAM(s) Oral every 12 hours PRN breakthrough pain      VITAL SIGNS:  Vital Signs Last 24 Hrs  T(C): 37 (02 Aug 2023 00:23), Max: 37.2 (01 Aug 2023 20:35)  T(F): 98.6 (02 Aug 2023 00:23), Max: 98.9 (01 Aug 2023 20:35)  HR: 110 (02 Aug 2023 00:23) (88 - 110)  BP: 143/73 (02 Aug 2023 00:23) (141/73 - 143/73)  BP(mean): 102 (02 Aug 2023 00:23) (101 - 102)  RR: 18 (02 Aug 2023 00:23) (18 - 18)  SpO2: 98% (02 Aug 2023 00:23) (97% - 99%)    Parameters below as of 02 Aug 2023 00:23  Patient On (Oxygen Delivery Method): room air        I&O's Detail    01 Aug 2023 07:01  -  02 Aug 2023 07:00  --------------------------------------------------------  IN:    Oral Fluid: 1700 mL  Total IN: 1700 mL    OUT:    Voided (mL): 2100 mL  Total OUT: 2100 mL    Total NET: -400 mL          PHYSICAL EXAM:  General: Comfortable, pleasant/anxious/agitated, Ill-appearing, well-nourished/frail/cachectic, comfortable / in distress  Neurological: AAOx3, no focal deficits  HEENT: NC/AT; EOMI, PERRL, clear conjunctiva, no nasal or oropharyngeal discharge or exudates, MMM  Neck: supple, no cervical or post-auricular lymphadenopathy  Cardiovascular: +S1/S2, no murmurs/rubs/gallops, RRR  Respiratory: CTA B/L, no diminished breath sounds, no wheezes/rales/rhonchi, no increased work of breathing or accessory muscle use  Gastrointestinal: soft, NT/ND; active BSx4 quadrants  Genitourinary: no suprapubic tenderness, no CVA tenderness  Extremities: WWP; no edema, clubbing or cyanosis  Vascular: 2+ radial, DP/PT pulses B/L  Skin: no rashes  Lines/Drains:     LABS:                        10.6   7.61  )-----------( 373      ( 01 Aug 2023 05:30 )             35.8     08-01    136  |  100  |  14  ----------------------------<  345<H>  3.9   |  26  |  0.73    Ca    8.6      01 Aug 2023 05:30  Phos  3.0       Mg     1.6         TPro  6.3  /  Alb  2.8<L>  /  TBili  <0.2  /  DBili  x   /  AST  11  /  ALT  9<L>  /  AlkPhos  148<H>      PT/INR - ( 01 Aug 2023 05:30 )   PT: 11.1 sec;   INR: 0.97          PTT - ( 01 Aug 2023 05:30 )  PTT:31.3 sec        BNP    Urinalysis Basic - ( 01 Aug 2023 05:30 )    Color: x / Appearance: x / SG: x / pH: x  Gluc: 345 mg/dL / Ketone: x  / Bili: x / Urobili: x   Blood: x / Protein: x / Nitrite: x   Leuk Esterase: x / RBC: x / WBC x   Sq Epi: x / Non Sq Epi: x / Bacteria: x            Microbiology:    Culture - Abscess with Gram Stain (collected 23 @ 16:57)  Source: .Abscess left inguinal abcess  Gram Stain (23 @ 18:09):    Few White blood cells    No organisms seen  Final Report (23 @ 10:58):    Rare Staphylococcus aureus  Organism: Staphylococcus aureus (23 @ 10:58)  Organism: Staphylococcus aureus (23 @ 10:58)      Method Type: FESTUS      -  Clindamycin: S <=0.25      -  Erythromycin: S <=0.25      -  Linezolid: S 2      -  Oxacillin: S <=0.25 Oxacillin predicts susceptibility for dicloxacillin, methicillin, and nafcillin      -  Rifampin: S <=1 Should not be used as monotherapy      -  Trimethoprim/Sulfamethoxazole: S <=0.5/9.5      -  Vancomycin: S 2    Culture - Abscess with Gram Stain (collected 23 @ 16:57)  Source: .Abscess left labial abscess  Gram Stain (23 @ 18:08):    Rare WBC's    No organisms seen  Preliminary Report (23 @ 11:18):    Rare Staphylococcus haemolyticus    Rare Enterococcus faecium Susceptibility to follow.    Rare Staphylococcus aureus Presumptive Methicillin susceptible    Confirmation to follow within 24 hrs.  Organism: Staphylococcus haemolyticus (23 @ 10:53)  Organism: Staphylococcus haemolyticus (23 @ 10:53)      Method Type: FESTUS      -  Clindamycin: R >4      -  Erythromycin: R >4      -  Linezolid: S 1      -  Oxacillin: R >2      -  Rifampin: R >2 Should not be used as monotherapy      -  Trimethoprim/Sulfamethoxazole: R >2/38      -  Vancomycin: S 4    Culture - Abscess with Gram Stain (collected 23 @ 11:00)  Source: .Abscess draining abscess in the left groin  Gram Stain (23 @ 15:50):    Few-moderate Gram Positive Cocci in Clusters    Moderate WBC's  Final Report (23 @ 12:16):    Numerous Staphylococcus aureus  Organism: Staphylococcus aureus (23 @ 12:16)  Organism: Staphylococcus aureus (23 @ 12:16)      Method Type: FESTUS      -  Clindamycin: S <=0.25      -  Erythromycin: S <=0.25      -  Linezolid: S 2      -  Oxacillin: S <=0.25 Oxacillin predicts susceptibility for dicloxacillin, methicillin, and nafcillin      -  Rifampin: S <=1 Should not be used as monotherapy      -  Trimethoprim/Sulfamethoxazole: S <=0.5/9.5      -  Vancomycin: S 2    Culture - Blood (collected 23 @ 22:35)  Source: .Blood Blood-Venous  Final Report (23 @ 00:00):    No growth at 5 days.    Culture - Blood (collected 23 @ 22:30)  Source: .Blood Blood-Peripheral  Final Report (23 @ 00:00):    No growth at 5 days.    Culture - Urine (collected 23 @ 22:26)  Source: Clean Catch Clean Catch (Midstream)  Final Report (23 @ 10:31):    >100,000 CFU/ml Staphylococcus aureus    >100,000 CFU/ml Enterococcus faecalis  Organism: Staphylococcus aureus  Enterococcus faecalis (23 @ 10:31)  Organism: Enterococcus faecalis (23 @ 10:31)      Method Type: FESTUS      -  Ciprofloxacin: R >2      -  Ampicillin: S <=2 Predicts results to ampicillin/sulbactam, amoxacillin-clavulanate and  piperacillin-tazobactam.      -  Nitrofurantoin: S <=32 Should not be used to treat pyelonephritis.      -  Tetracycline: R >8      -  Vancomycin: S 2  Organism: Staphylococcus aureus (23 @ 10:31)      Method Type: FESTUS      -  Linezolid: S 2      -  Nitrofurantoin: S <=32      -  Oxacillin: S <=0.25 Oxacillin predicts susceptibility for dicloxacillin, methicillin, and nafcillin      -  Rifampin: S <=1 Should not be used as monotherapy      -  Trimethoprim/Sulfamethoxazole: S <=0.5/9.5      -  Vancomycin: S 2          RADIOLOGY & ADDITIONAL STUDIES: Reviewed. Patient is a 43y old  Female who presents with a chief complaint of dysuria (01 Aug 2023 11:10)      HPI/HOSPITAL COURSE:  HPI:  43F with PMHx Asthma, CVA (2021; residual L>R weakness), PE (2023 on Eliquis), uncontrolled DM-2 (on insulin), HTN, HLD, hx abdominal necrotizing fasciitis ( ostomy placement in 2021 with intubation from -2021), hx of frequent UTIs 2/2 to suprapubic catheter, most recent UTI c/b Urosepsis and discharged with midline for Ertapenem, recent cardiac stents now presenting due to a new pimple in the left inguinal area near her left labia and dysuria. In the ED, the pt was given Ertapenem x1. During examination, the pt was found to have purulent cellulitis for which she we started the pt on Zosyn initially. Wound cultures initially were concerning for possible MRSA and the antibiotics were changed to Daptomuycin as the pt has an allergy to Vancomycin. Final cultures with sensitivities revealed MSSA and the pt was started on Linezolid 600 PO per ID recommendations. She is now s/p I&D by surgery on .  For the pt's dysuria, she was initially on Ertapenem given the history of Urosepsis with Klebsiella, was then given 1 dose of Macrobid on  and d/c due to the pt not having symptoms anymore. The pt was also complaining of pain around her ostomy bag for which surgery was consulted and decided no interventions at this time. The pt was also complaining of chest pain and pressure which has been happening from prior to arrival. She states that she recently had stents placed on  and was supposed to follow up in 1 week to have intervention for a 70% dLCx/OM1. ECG and troponin were WNL this admission. Cardiology has scheduled the pt for the PCI procedure on . S/P staged PCI 2023 NAT x 1 mLCx/OM1 (70%), RCA: patent stents, LM: short segment, MLI, mLAD: 40%, EDP: 29 mmHg (IC team did not want lasix); no EF: R TR at 8 PM.    In the ED:  Vitals: T 98.2, HR 75, /76, 98% on RA  Labs: WBC 11.64, Hgb 13, plt 455, K 3.2, Cl 93, lactate 3.7 --> 2.9 --> 1.5  UA: small blood, <5 WBCs, >10RBCs, no bacteria  CXR: no acute infiltrates  EKG: pending  Interventions: Ertapenem 1g, NS 2L IVB, 1L LR, Ofirmev 1g, Dilaudid 1mg IVPx2, Mg 2g, Zofran 4mg IVP, potassium 80meq   (2023 04:16)        INTERVAL EVENTS:    SUBJECTIVE HPI: Patient seen and examined at bedside. Patient resting comfortably, no complaints at this time. Patient denies: fever, chills, weakness, dizziness, headaches, changes in vision, chest pain, palpitations, shortness of breath, cough, N/V, diarrhea or constipation, dysuria, LE edema. ROS otherwise negative.      PAST MEDICAL & SURGICAL HISTORY:  Hyperlipidemia      Abdominal hernia      Pre-eclampsia      Essential hypertension      Obesity      Pulmonary embolism      Type 2 diabetes mellitus      History of necrotizing fasciitis      History of creation of ostomy      Suprapubic catheter      H/O: CVA (cerebrovascular accident)      Sepsis, unspecified organism      H/O LEEP      History of D&C      H/O:       H/O ventral hernia repair      H/O exploratory laparotomy      History of creation of ostomy          FAMILY HISTORY:  FH: diabetes mellitus  father and mother    FH: hypertension  father and mother        SOCIAL HISTORY:     -Cigarrettes:     -Alcohol:     -Ilicit Drug Use:    Home Medications:  acetaminophen 325 mg oral tablet: 3 tab(s) orally every 8 hours (2023 17:49)  Admelog 100 units/mL injectable solution: 30 unit(s) injectable 3 times a day (with meals) (2023 17:09)  gabapentin 600 mg oral tablet: 1 tab(s) orally 3 times a day (2023 17:49)  HumuLIN R (Concentrated) 500 units/mL subcutaneous solution: 50 unit(s) subcutaneous 3 times a day (2023 17:09)  ibuprofen 800 mg oral tablet: 1 tab(s) orally 3 times a day (2023 17:49)      MEDICATIONS  (STANDING):  albuterol/ipratropium for Nebulization 3 milliLiter(s) Nebulizer every 6 hours  atorvastatin 80 milliGRAM(s) Oral at bedtime  clopidogrel Tablet 75 milliGRAM(s) Oral daily  clotrimazole 2% Vaginal Cream 1 Applicatorful Vaginal daily  dextrose 5%. 1000 milliLiter(s) (50 mL/Hr) IV Continuous <Continuous>  dextrose 5%. 1000 milliLiter(s) (100 mL/Hr) IV Continuous <Continuous>  dextrose 50% Injectable 25 Gram(s) IV Push once  dextrose 50% Injectable 25 Gram(s) IV Push once  dextrose 50% Injectable 12.5 Gram(s) IV Push once  gabapentin 600 milliGRAM(s) Oral every 8 hours  glucagon  Injectable 1 milliGRAM(s) IntraMuscular once  insulin glargine Injectable (LANTUS) 50 Unit(s) SubCutaneous at bedtime  insulin glargine Injectable (LANTUS) 50 Unit(s) SubCutaneous every morning  insulin lispro (ADMELOG) corrective regimen sliding scale   SubCutaneous Before meals and at bedtime  insulin lispro Injectable (ADMELOG) 25 Unit(s) SubCutaneous three times a day before meals  linezolid    Tablet 600 milliGRAM(s) Oral every 12 hours  metoprolol succinate ER 50 milliGRAM(s) Oral every 24 hours  pantoprazole    Tablet 40 milliGRAM(s) Oral before breakfast  sodium chloride 0.9%. 500 milliLiter(s) (75 mL/Hr) IV Continuous <Continuous>    MEDICATIONS  (PRN):  acetaminophen     Tablet .. 650 milliGRAM(s) Oral every 6 hours PRN Temp greater or equal to 38C (100.4F), Mild Pain (1 - 3)  dextrose Oral Gel 15 Gram(s) Oral once PRN Blood Glucose LESS THAN 70 milliGRAM(s)/deciliter  diphenhydrAMINE Injectable 50 milliGRAM(s) IV Push once PRN Allergy symptoms  oxyCODONE    IR 5 milliGRAM(s) Oral every 6 hours PRN Severe Pain (7 - 10)  oxyCODONE    IR 10 milliGRAM(s) Oral every 12 hours PRN breakthrough pain      VITAL SIGNS:  Vital Signs Last 24 Hrs  T(C): 37 (02 Aug 2023 00:23), Max: 37.2 (01 Aug 2023 20:35)  T(F): 98.6 (02 Aug 2023 00:23), Max: 98.9 (01 Aug 2023 20:35)  HR: 110 (02 Aug 2023 00:23) (88 - 110)  BP: 143/73 (02 Aug 2023 00:23) (141/73 - 143/73)  BP(mean): 102 (02 Aug 2023 00:23) (101 - 102)  RR: 18 (02 Aug 2023 00:23) (18 - 18)  SpO2: 98% (02 Aug 2023 00:23) (97% - 99%)    Parameters below as of 02 Aug 2023 00:23  Patient On (Oxygen Delivery Method): room air        I&O's Detail    01 Aug 2023 07:01  -  02 Aug 2023 07:00  --------------------------------------------------------  IN:    Oral Fluid: 1700 mL  Total IN: 1700 mL    OUT:    Voided (mL): 2100 mL  Total OUT: 2100 mL    Total NET: -400 mL          PHYSICAL EXAM:  General: Comfortable, pleasant/anxious/agitated, Ill-appearing, well-nourished/frail/cachectic, comfortable / in distress  Neurological: AAOx3, no focal deficits  HEENT: NC/AT; EOMI, PERRL, clear conjunctiva, no nasal or oropharyngeal discharge or exudates, MMM  Neck: supple, no cervical or post-auricular lymphadenopathy  Cardiovascular: +S1/S2, no murmurs/rubs/gallops, RRR  Respiratory: CTA B/L, no diminished breath sounds, no wheezes/rales/rhonchi, no increased work of breathing or accessory muscle use  Gastrointestinal: soft, NT/ND; active BSx4 quadrants  Genitourinary: no suprapubic tenderness, no CVA tenderness  Extremities: WWP; no edema, clubbing or cyanosis  Vascular: 2+ radial, DP/PT pulses B/L  Skin: no rashes  Lines/Drains:     LABS:                        10.6   7.61  )-----------( 373      ( 01 Aug 2023 05:30 )             35.8     08-01    136  |  100  |  14  ----------------------------<  345<H>  3.9   |  26  |  0.73    Ca    8.6      01 Aug 2023 05:30  Phos  3.0       Mg     1.6         TPro  6.3  /  Alb  2.8<L>  /  TBili  <0.2  /  DBili  x   /  AST  11  /  ALT  9<L>  /  AlkPhos  148<H>      PT/INR - ( 01 Aug 2023 05:30 )   PT: 11.1 sec;   INR: 0.97          PTT - ( 01 Aug 2023 05:30 )  PTT:31.3 sec        BNP    Urinalysis Basic - ( 01 Aug 2023 05:30 )    Color: x / Appearance: x / SG: x / pH: x  Gluc: 345 mg/dL / Ketone: x  / Bili: x / Urobili: x   Blood: x / Protein: x / Nitrite: x   Leuk Esterase: x / RBC: x / WBC x   Sq Epi: x / Non Sq Epi: x / Bacteria: x            Microbiology:    Culture - Abscess with Gram Stain (collected 23 @ 16:57)  Source: .Abscess left inguinal abcess  Gram Stain (23 @ 18:09):    Few White blood cells    No organisms seen  Final Report (23 @ 10:58):    Rare Staphylococcus aureus  Organism: Staphylococcus aureus (23 @ 10:58)  Organism: Staphylococcus aureus (23 @ 10:58)      Method Type: FESTUS      -  Clindamycin: S <=0.25      -  Erythromycin: S <=0.25      -  Linezolid: S 2      -  Oxacillin: S <=0.25 Oxacillin predicts susceptibility for dicloxacillin, methicillin, and nafcillin      -  Rifampin: S <=1 Should not be used as monotherapy      -  Trimethoprim/Sulfamethoxazole: S <=0.5/9.5      -  Vancomycin: S 2    Culture - Abscess with Gram Stain (collected 23 @ 16:57)  Source: .Abscess left labial abscess  Gram Stain (23 @ 18:08):    Rare WBC's    No organisms seen  Preliminary Report (23 @ 11:18):    Rare Staphylococcus haemolyticus    Rare Enterococcus faecium Susceptibility to follow.    Rare Staphylococcus aureus Presumptive Methicillin susceptible    Confirmation to follow within 24 hrs.  Organism: Staphylococcus haemolyticus (08-01-23 @ 10:53)  Organism: Staphylococcus haemolyticus (23 @ 10:53)      Method Type: FESTUS      -  Clindamycin: R >4      -  Erythromycin: R >4      -  Linezolid: S 1      -  Oxacillin: R >2      -  Rifampin: R >2 Should not be used as monotherapy      -  Trimethoprim/Sulfamethoxazole: R >2/38      -  Vancomycin: S 4    Culture - Abscess with Gram Stain (collected 23 @ 11:00)  Source: .Abscess draining abscess in the left groin  Gram Stain (23 @ 15:50):    Few-moderate Gram Positive Cocci in Clusters    Moderate WBC's  Final Report (23 @ 12:16):    Numerous Staphylococcus aureus  Organism: Staphylococcus aureus (23 @ 12:16)  Organism: Staphylococcus aureus (23 @ 12:16)      Method Type: FESTUS      -  Clindamycin: S <=0.25      -  Erythromycin: S <=0.25      -  Linezolid: S 2      -  Oxacillin: S <=0.25 Oxacillin predicts susceptibility for dicloxacillin, methicillin, and nafcillin      -  Rifampin: S <=1 Should not be used as monotherapy      -  Trimethoprim/Sulfamethoxazole: S <=0.5/9.5      -  Vancomycin: S 2    Culture - Blood (collected 23 @ 22:35)  Source: .Blood Blood-Venous  Final Report (23 @ 00:00):    No growth at 5 days.    Culture - Blood (collected 23 @ 22:30)  Source: .Blood Blood-Peripheral  Final Report (23 @ 00:00):    No growth at 5 days.    Culture - Urine (collected 23 @ 22:26)  Source: Clean Catch Clean Catch (Midstream)  Final Report (23 @ 10:31):    >100,000 CFU/ml Staphylococcus aureus    >100,000 CFU/ml Enterococcus faecalis  Organism: Staphylococcus aureus  Enterococcus faecalis (23 @ 10:31)  Organism: Enterococcus faecalis (23 @ 10:31)      Method Type: FESTUS      -  Ciprofloxacin: R >2      -  Ampicillin: S <=2 Predicts results to ampicillin/sulbactam, amoxacillin-clavulanate and  piperacillin-tazobactam.      -  Nitrofurantoin: S <=32 Should not be used to treat pyelonephritis.      -  Tetracycline: R >8      -  Vancomycin: S 2  Organism: Staphylococcus aureus (23 @ 10:31)      Method Type: FESTUS      -  Linezolid: S 2      -  Nitrofurantoin: S <=32      -  Oxacillin: S <=0.25 Oxacillin predicts susceptibility for dicloxacillin, methicillin, and nafcillin      -  Rifampin: S <=1 Should not be used as monotherapy      -  Trimethoprim/Sulfamethoxazole: S <=0.5/9.5      -  Vancomycin: S 2          RADIOLOGY & ADDITIONAL STUDIES: Reviewed.

## 2023-08-02 NOTE — DIETITIAN INITIAL EVALUATION ADULT - LITERATURE/VIDEOS GIVEN
Handouts per [employer] for diabetes and Heart healthy CST CHO diet given to reinforce education as provided by writer

## 2023-08-02 NOTE — OCCUPATIONAL THERAPY INITIAL EVALUATION ADULT - LIGHT TOUCH SENSATION, LUE, REHAB EVAL
pt reports numbness at the tips of her fingers leading to frequently dropping items/moderate impairment

## 2023-08-02 NOTE — DISCHARGE NOTE PROVIDER - DETAILS OF MALNUTRITION DIAGNOSIS/DIAGNOSES
Addended by: MARIANA BOX on: 1/17/2020 02:25 PM     Modules accepted: Orders    
This patient has been assessed with a concern for Malnutrition and was treated during this hospitalization for the following Nutrition diagnosis/diagnoses:     -  08/02/2023: Moderate protein-calorie malnutrition

## 2023-08-02 NOTE — CONSULT NOTE ADULT - CONSULT REASON
L labia abscess
Parastomal hernia
CAD
Co-management
type 2 diabetes with steroid induced hyperglycemia

## 2023-08-02 NOTE — DIETITIAN INITIAL EVALUATION ADULT - PROBLEM SELECTOR PLAN 2
pt reports ongoing hx of dysuria, which worsened the past few days. Pt has a history of recurrent UTIs, which was attributed to suprapubic catheter. Catheter was removed during previous hospitalization in June  -initial UA negative of WBCs, leuk esterase and nitrites  -given hx of recurrent UTIs and drug resistant bacteria, will obtain repeat UA  -f/u UCx

## 2023-08-02 NOTE — DISCHARGE NOTE PROVIDER - NSDCCPCAREPLAN_GEN_ALL_CORE_FT
PRINCIPAL DISCHARGE DIAGNOSIS  Diagnosis: Cellulitis  Assessment and Plan of Treatment: You were seen for groin cellulitis and underwent I&D (incision and drainage) by the Surgery Team inpatient on 7/30/23. You were treated with IV antibiotics, then transitioned to oral:  Continue Linezolid antibiotic twice a day through 8/5/23. Continue with daily wound care with help by visiting RN/wound care as set up by our . Call medical provider immediately for any fever or worsening of condition. Return to ER for altered mental status or other emergency. WOUND CARE: Packing removed 8/1/23 per ID 8/2/23 note; Daily ABD; Keep clean and dry.  See your Primary Care Dr. Pelon Sánchez on 8/9/23 at 8:15AM for a check up and to maintain close outpatient followup. We made this appointment for you- please attend.      SECONDARY DISCHARGE DIAGNOSES  Diagnosis: CAD (coronary artery disease)  Assessment and Plan of Treatment: -You underwent a cardiac catheterization 8.1.2023 and the blockage in your mid-Left circumflex/OM1 artery was opened with stent placement. NEVER MISS A DOSE OF PLAVIX; IF YOU DO, YOU ARE AT RISK OF YOUR STENT CLOSING AND HAVING A HEART ATTACK. DO NOT STOP THESE TWO MEDICATIONS UNLESS INSTRUCTED TO DO SO BY YOUR CARDIOLOGIST. Your procedure was done through your wrist. You do not need to keep this area covered and you may shower.  However, do not take baths or go in pools or hottubs for 1 week to prevent infection. Please avoid any heavy lifting  (no more than 3 to 5 lbs) or straining/ strenuous activity for 7 days. If you develop any swelling, bleeding, hardening of the skin (hematoma formation), acute pain, numbness/tingling/weakness in your arm or leg please contact your doctor immediately or call our 24/7 line:  [phone], or call 911. Please return to the hospital/seek immediate medical attention if worsening of symptoms- including not limited to chest pain, shortness of breath. PLEASE SEE Cardiologist Dr. Rowdy Oswald [address] [place] [address]  [phone]. Appointment: 8/8 @ 12 PM. We set up this appointment for you as he accepts your insurance. please maintain close follow-up. Please continue a heart healthy diet low in sodium, cholesterol, and fat.    Diagnosis: Diabetes mellitus  Assessment and Plan of Treatment: Please continue fingerstick monitoring 3x a day before meals and before bedtime. Your glucose goal is 100-180 mg/dL.    - For discharge, patient can Humlaog U 500 -- 60 units three times a day and admelog 26 units three times a day with meals.  - You can follow up at discharge with Dr Joseph [facility] by calling [phone] to make an appointment.    Diagnosis: Hyperlipidemia  Assessment and Plan of Treatment: Continue increased statin dose of Lipitor 80mg nightly.  Continue prescribed medications to control your cholesterol levels and a DASH (Low fat/salt) diet. Follow up with your primary care provider upon discharge for further management and monitoring of cholesterol levels.    Diagnosis: History of pulmonary embolism  Assessment and Plan of Treatment: Continue Eliquis 5mg twice a day until cleared by your outpatient provider for your pulmonary embolism.  Do not stop this medication until told by your provider. Monitor for any bright red or dark black stools, extreme fatigue or lightheadedness, chest pain, or shortness of breath; if these occur please call 911 or come to the ER.    Diagnosis: Colostomy hernia  Assessment and Plan of Treatment: Continue with home care.    Diagnosis: Vaginal itching  Assessment and Plan of Treatment: Complete one last dose of Clotrimazole applicator.    Diagnosis: Prophylactic measure  Assessment and Plan of Treatment: Continue with close outpatient followup, home PT and home OT, and home wound care and visiting RN.

## 2023-08-02 NOTE — PROGRESS NOTE ADULT - PROBLEM SELECTOR PLAN 2
Purulent cellulitis started as pimple on her labia, which progressed to swelling, erythema, tenderness from L labia to inner thigh   - S/P I & D with surgery on 07/30/23   - Would culture: Staphylococcus aureus; BCx: NGTD  - S/P Ertapenem 1g In ED; s/p Zosyn from 07/28-07/29; s/p Daptomycin 600mg Q24H and Ertapenem 1g q24h from 7/29-7/30  - F/u E. faecium sensitivities    -Cont Linezolid 600 mg PO q12h - 7d course from I&D (7/30-8/5) - ID signed off  - Patient complaining of vaginal discharge/itching, can give Fluconazole 200mg PO x 1  - c/w Oxycodone 10mg for breakthrough pain and Oxycodone 5mg for severe pain  - Tylenol q6h PRN Q6H for fever.

## 2023-08-02 NOTE — PROGRESS NOTE ADULT - PROBLEM SELECTOR PLAN 1
Pt recently admitted to cardiology 06/23/23 underwent cardiac cath - 6/26/23: IVUS guided NAT pRCA (85%), NAT RPLS (80%); residual dLCx/OM1 70%, mLAD 50%. Pt was instructed to return for staged PCI of LCx and OM1 only if symptomatic  - Pt complaining of CP, ecg WNL, troponin neg  - ECHO 06/23/23: Mild LVH. Hyperdynamic LVSF. Normal RV/RVSF.  - S/P staged PCI 08/01/2023 NAT x 1 mLCx/OM1 (70%), RCA: patent stents, LM: short segment, MLI, mLAD: 40%, EDP: 29 mmHg (IC team did not want lasix); no EF: R TR at 8 PM.   - Aspirin 81mg d/c 08/02/2023  - Continue Plavix 75 mg daily, Eliquis 5 mg BID, Atorvastatin 80 mg daily

## 2023-08-02 NOTE — PROGRESS NOTE ADULT - PROBLEM SELECTOR PLAN 3
Pt reports ongoing hx of dysuria, with + history recurrent UTIS 2/2 suprapubic catheter (which was removed 06/2023)  - Initial UA 06/27/23 - negative of WBCs, leuk esterase and nitrites  - Urine Cx: Staph aureus and enterococcus faecalis with sensitivities  - Received Zosyn and Ertapenem for this symptoms   - Asymptomatic at this time - no abx as per medicine.

## 2023-08-02 NOTE — PROGRESS NOTE ADULT - PROBLEM/PLAN-3
DISPLAY PLAN FREE TEXT
security/safe

## 2023-08-02 NOTE — PROGRESS NOTE ADULT - REASON FOR ADMISSION
dysuria
Cardiac cath
Purulent Cellulitis
dysuria
L groin abscess
dysuria
Abscess
cellulitis
Purulent Cellulitis in the inguinal area
Abscess

## 2023-08-02 NOTE — CONSULT NOTE ADULT - ASSESSMENT
42 y/o female with PMHx Asthma, CVA (11/2021; residual L>R weakness), PE (4/2023 on Eliquis), uncontrolled DM-2 (on insulin), HTN, HLD, hx abdominal necrotizing fasciitis ( ostomy placement in 11/2021 with intubation from 11-12/2021), hx of frequent UTIs 2/2 to suprapubic catheter, most recent UTI c/b Urosepsis and discharged with midline for Ertapenem, recent cardiac stents now presenting due to general malaise with c/f recurrent UTI, and CP admitted for management of cellulitis. Now s/p additional NAT 8/1.       #Purulent Cellulitis: Pt reports new pimple on L labia present for several days, now progressed to swelling, erythema, tenderness extending from the L labia to inner thigh. Initially noted purulent and bloody drainage. On physical exam, bloody and purulent drainage noted from site, very tender to palpation and erythematous. s/p Ertapenem 1g in the ED, transitioned to Zosyn from 7/28-7/29. S/p Daptomycin 600mg Q24H and Ertapenem 1g q24h from 7/29-7/30. Wound Cx: Staphylococcus aureus. s/p I&D of the abscess by surgery on 7/30, s/p Dilaudid in pm for pain 0.5mg x1. s/p vaginal discharge/itching, received Fluconazole 200mg PO x 1  - BCx: NGTD  - c/w Linezolid 600mg PO Q12H (7/31-8/5)  - c/w Oxycodone 10mg for breakthrough pain and Oxycodone 5mg for severe pain  - Tylenol q6h PRN Q6H for fever  - PT consult.    #Asymptomatic bacteriuria: pt reports ongoing hx of dysuria, which worsened the past few days. Pt has a history of recurrent UTIs, which was attributed to suprapubic catheter. Catheter was removed during previous hospitalization in June. Initial UA negative of WBCs, leuk esterase and nitrites. Urine Cx: Staph aureus and enterococcus faecalis with sensitivities. Received Zosyn and Ertapenem for this symptoms. Asymptomatic at this time  - No abx at this time   - Monitor for symptoms  - Monitor vitals and daily CBC.    #Colostomy hernia: Pt with Hx of abdominal necrotizing fascitis s/p ostomy placement. Pt with pain around the ostomy, on exam there is a hernia palpated at the ostomy site, tender to touch. CT Abdomen from 6/21 showing Fat-containing parastomal hernia, unchanged. Surgery following. Producing formed stool  - Surgery states no intervention at this time as it is easily reducible  - Monitor ostomy output.    #CAD (coronary artery disease): Pt was admitted to cardiac service during last admission. Pt underwent CCTA on 06/23/2023 revealing Ca score 129 (accuracy of score is limited by image noise), probable severe pRCA stenosis, mod D2 stenosis, non obstructive in remaining coronaries. Pt is s/p cardiac angiogram on 6/26 and is s/p IVUS guided NAT pRCA (85%), NAT RPLS (80%); residual dLCx/OM1 70%, mLAD 50%. Pt was instructed to return for staged PCI of LCx and OM1 only if symptomatic  Pt having chest pain, ECG wnl, troponin neg  Cardiology following   Plan:   - Increased Atorvastatin to 80 at bedtime  - c/w toprol 50mg PO q24h (hold HR <60, SBP <100)   - c/w Plavix 75mg PO daily  - Loaded with ASA 325mg PO x1 on 7/31, will start ASA 81mg PO qd per cardio recs.    #Stable angina: Pt recently admitted to cardiology 06/23/23 underwent cardiac cath - 6/26/23: IVUS guided NAT pRCA (85%), NAT RPLS (80%); residual dLCx/OM1 70%, mLAD 50%. Pt was instructed to return for staged PCI of LCx and OM1 only if symptomatic. Pt with chest discomfort on exertion. ECG with NSR and no signs of ischemic changes. Trops negative. PCI with Dr Carver on 8/1w/ NAT x 1 mLCx/OM1 (70%), RCA: patent stents, LM: short segment, MLI, mLAD: 40%, EDP: 29 mmHg  - Continue medications per cardiology recs     #Diabetes mellitus: Per previous hospitalization, pt was on Lantus 50u in AM and 50u at bedtime. Also on Lispro 25u before meals. Glucose >300 on arrival  Glucose at 200s now, on ISS  -     #History of pulmonary embolism: History of bilateral pulmonary emboli in segmental and subsegmental branches on the R and subsegmental branches on the L, diagnosed on CT chest during ED visit on 4/7/23. Home meds: Eliquis 5mg BID. No respiratory distress at this time   - Hold Eliquis until after cath  - PT consult.   42 y/o female with PMHx Asthma, CVA (11/2021; residual L>R weakness), PE (4/2023 on Eliquis), uncontrolled DM-2 (on insulin), HTN, HLD, hx abdominal necrotizing fasciitis ( ostomy placement in 11/2021 with intubation from 11-12/2021), hx of frequent UTIs 2/2 to suprapubic catheter, most recent UTI c/b Urosepsis and discharged with midline for Ertapenem, recent cardiac stents now presenting due to general malaise with c/f recurrent UTI, and CP admitted for management of cellulitis. Now s/p additional NAT 8/1.       #Purulent Cellulitis: Pt reports new pimple on L labia present for several days, now progressed to swelling, erythema, tenderness extending from the L labia to inner thigh. Initially noted purulent and bloody drainage. On physical exam, bloody and purulent drainage noted from site, very tender to palpation and erythematous. s/p Ertapenem 1g in the ED, transitioned to Zosyn from 7/28-7/29. S/p Daptomycin 600mg Q24H and Ertapenem 1g q24h from 7/29-7/30. Wound Cx: Staphylococcus aureus. s/p I&D of the abscess by surgery on 7/30, s/p Dilaudid in pm for pain 0.5mg x1. s/p vaginal discharge/itching, received Fluconazole 200mg PO x 1  - BCx: NGTD  - c/w Linezolid 600mg PO Q12H (7/31-8/5)  - c/w Oxycodone 10mg for breakthrough pain and Oxycodone 5mg for severe pain  - Tylenol q6h PRN Q6H for fever  - PT consult.    #Asymptomatic bacteriuria: pt reports ongoing hx of dysuria, which worsened the past few days. Pt has a history of recurrent UTIs, which was attributed to suprapubic catheter. Catheter was removed during previous hospitalization in June. Initial UA negative of WBCs, leuk esterase and nitrites. Urine Cx: Staph aureus and enterococcus faecalis with sensitivities. Received Zosyn and Ertapenem for this symptoms. Asymptomatic at this time  - No abx at this time   - Monitor for symptoms  - Monitor vitals and daily CBC.    #Colostomy hernia: Pt with Hx of abdominal necrotizing fascitis s/p ostomy placement. Pt with pain around the ostomy, on exam there is a hernia palpated at the ostomy site, tender to touch. CT Abdomen from 6/21 showing Fat-containing parastomal hernia, unchanged. Surgery following. Producing formed stool  - Surgery states no intervention at this time as it is easily reducible  - Monitor ostomy output.    #CAD (coronary artery disease): Pt was admitted to cardiac service during last admission. Pt underwent CCTA on 06/23/2023 revealing Ca score 129 (accuracy of score is limited by image noise), probable severe pRCA stenosis, mod D2 stenosis, non obstructive in remaining coronaries. Pt is s/p cardiac angiogram on 6/26 and is s/p IVUS guided NAT pRCA (85%), NAT RPLS (80%); residual dLCx/OM1 70%, mLAD 50%. Pt was instructed to return for staged PCI of LCx and OM1 only if symptomatic  Pt having chest pain, ECG wnl, troponin neg  Cardiology following   Plan:   - Increased Atorvastatin to 80 at bedtime  - c/w toprol 50mg PO q24h (hold HR <60, SBP <100)   - c/w Plavix 75mg PO daily  - Loaded with ASA 325mg PO x1 on 7/31, will start ASA 81mg PO qd per cardio recs.    #Stable angina: Pt recently admitted to cardiology 06/23/23 underwent cardiac cath - 6/26/23: IVUS guided NAT pRCA (85%), NAT RPLS (80%); residual dLCx/OM1 70%, mLAD 50%. Pt was instructed to return for staged PCI of LCx and OM1 only if symptomatic. Pt with chest discomfort on exertion. ECG with NSR and no signs of ischemic changes. Trops negative. PCI with Dr Carver on 8/1w/ NAT x 1 mLCx/OM1 (70%), RCA: patent stents, LM: short segment, MLI, mLAD: 40%, EDP: 29 mmHg  - Continue medications per cardiology recs     #Diabetes mellitus: Per previous hospitalization, pt was on Lantus 50u in AM and 50u at bedtime. Also on Lispro 25u before meals. Glucose >300 on arrival  Glucose at 200s now, on ISS  -     #History of pulmonary embolism: History of bilateral pulmonary emboli in segmental and subsegmental branches on the R and subsegmental branches on the L, diagnosed on CT chest during ED visit on 4/7/23. Home meds: Eliquis 5mg BID. No respiratory distress at this time   - Hold Eliquis until after cath  - PT consult    Medicine will continue to follow.  Note not finalized until attending attesting.    42 y/o female with PMHx Asthma, CVA (11/2021; residual L>R weakness), PE (4/2023 on Eliquis), uncontrolled DM-2 (on insulin), HTN, HLD, hx abdominal necrotizing fasciitis ( ostomy placement in 11/2021 with intubation from 11-12/2021), hx of frequent UTIs 2/2 to suprapubic catheter, most recent UTI c/b Urosepsis and discharged with midline for Ertapenem, recent cardiac stents now presenting due to general malaise with c/f recurrent UTI, and CP admitted for management of cellulitis. Now s/p additional NAT 8/1.       #Purulent Cellulitis: Pt reports new pimple on L labia present for several days, now progressed to swelling, erythema, tenderness extending from the L labia to inner thigh. Initially noted purulent and bloody drainage. On physical exam, bloody and purulent drainage noted from site, very tender to palpation and erythematous. s/p Ertapenem 1g in the ED, transitioned to Zosyn from 7/28-7/29. S/p Daptomycin 600mg Q24H and Ertapenem 1g q24h from 7/29-7/30. Wound Cx: Staphylococcus aureus. s/p I&D of the abscess by surgery on 7/30, s/p Dilaudid in pm for pain 0.5mg x1. s/p vaginal discharge/itching, received Fluconazole 200mg PO x 1  - BCx: NGTD  - c/w Linezolid 600mg PO Q12H (7/31-8/5)  - c/w Oxycodone 10mg for breakthrough pain and Oxycodone 5mg for severe pain  - Tylenol q6h PRN Q6H for fever  - PT consult    #Asymptomatic bacteriuria: pt reports ongoing hx of dysuria, which worsened the past few days. Pt has a history of recurrent UTIs, which was attributed to suprapubic catheter. Catheter was removed during previous hospitalization in June. Initial UA negative of WBCs, leuk esterase and nitrites. Urine Cx: Staph aureus and enterococcus faecalis with sensitivities. Received Zosyn and Ertapenem for this symptoms. Asymptomatic at this time  - No abx at this time   - Monitor for symptoms  - Monitor vitals and daily CBC.    #Colostomy hernia: Pt with Hx of abdominal necrotizing fascitis s/p ostomy placement. Pt with pain around the ostomy, on exam there is a hernia palpated at the ostomy site, tender to touch. CT Abdomen from 6/21 showing Fat-containing parastomal hernia, unchanged. Surgery following. Producing formed stool  - Surgery states no intervention at this time as it is easily reducible  - Monitor ostomy output.    #CAD (coronary artery disease): Pt was admitted to cardiac service during last admission. Pt underwent CCTA on 06/23/2023 revealing Ca score 129 (accuracy of score is limited by image noise), probable severe pRCA stenosis, mod D2 stenosis, non obstructive in remaining coronaries. Pt is s/p cardiac angiogram on 6/26 and is s/p IVUS guided NAT pRCA (85%), NAT RPLS (80%); residual dLCx/OM1 70%, mLAD 50%. Pt was instructed to return for staged PCI of LCx and OM1 only if symptomatic. Pt having chest pain, post procedure   - c/w Atorvastatin to 80 at bedtime  - remainder of plan per cardiology primary team    #Stable angina: Pt recently admitted to cardiology 06/23/23 underwent cardiac cath - 6/26/23: IVUS guided NAT pRCA (85%), NAT RPLS (80%); residual dLCx/OM1 70%, mLAD 50%. Pt was instructed to return for staged PCI of LCx and OM1 only if symptomatic. Pt with chest discomfort on exertion. ECG with NSR and no signs of ischemic changes. Trops negative. PCI with Dr Carver on 8/1w/ NAT x 1 mLCx/OM1 (70%), RCA: patent stents, LM: short segment, MLI, mLAD: 40%, EDP: 29 mmHg  - Continue medications per cardiology recs     #Diabetes mellitus: Per previous hospitalization, pt was on Lantus 50u in AM and 50u at bedtime. Also on Lispro 25u before meals. Glucose >300 on arrival. Glucose at 500s now, on ISS, likely d/t pre-medication with steroid i/s/o contrast allergy and active infections.   - appreciate endo recs      #History of pulmonary embolism: History of bilateral pulmonary emboli in segmental and subsegmental branches on the R and subsegmental branches on the L, diagnosed on CT chest during ED visit on 4/7/23. Home meds: Eliquis 5mg BID. No respiratory distress at this time   - Hold Eliquis until 48h after procedure (then may resume)  - PT consult    Medicine will continue to follow.  Note not finalized until attending attesting.

## 2023-08-02 NOTE — OCCUPATIONAL THERAPY INITIAL EVALUATION ADULT - PERSONAL SAFETY AND JUDGMENT, REHAB EVAL
pt with impaired safety awareness noted, requiring max verbal cues to maintain safety. Pt also requesting therapist not to touch her (therapist providing CGA for safety due to impaired balance and L foot drop)./impaired

## 2023-08-02 NOTE — PROGRESS NOTE ADULT - SUBJECTIVE AND OBJECTIVE BOX
Interventional Cardiology PA Adult Progress Note    C.C.:     Subjective Assessment:      ROS Negative except as per Subjective and HPI  	  MEDICATIONS:  metoprolol succinate ER 50 milliGRAM(s) Oral every 24 hours    linezolid    Tablet 600 milliGRAM(s) Oral every 12 hours    albuterol/ipratropium for Nebulization 3 milliLiter(s) Nebulizer every 6 hours  diphenhydrAMINE Injectable 50 milliGRAM(s) IV Push once PRN    acetaminophen     Tablet .. 650 milliGRAM(s) Oral every 6 hours PRN  gabapentin 600 milliGRAM(s) Oral every 8 hours  oxyCODONE    IR 10 milliGRAM(s) Oral every 12 hours PRN  oxyCODONE    IR 5 milliGRAM(s) Oral every 6 hours PRN    pantoprazole    Tablet 40 milliGRAM(s) Oral before breakfast    atorvastatin 80 milliGRAM(s) Oral at bedtime  dextrose 50% Injectable 12.5 Gram(s) IV Push once  dextrose 50% Injectable 25 Gram(s) IV Push once  dextrose 50% Injectable 25 Gram(s) IV Push once  dextrose Oral Gel 15 Gram(s) Oral once PRN  glucagon  Injectable 1 milliGRAM(s) IntraMuscular once  insulin glargine Injectable (LANTUS) 50 Unit(s) SubCutaneous at bedtime  insulin glargine Injectable (LANTUS) 50 Unit(s) SubCutaneous every morning  insulin lispro (ADMELOG) corrective regimen sliding scale   SubCutaneous Before meals and at bedtime  insulin lispro Injectable (ADMELOG) 25 Unit(s) SubCutaneous three times a day before meals    clopidogrel Tablet 75 milliGRAM(s) Oral daily  clotrimazole 2% Vaginal Cream 1 Applicatorful Vaginal daily  dextrose 5%. 1000 milliLiter(s) IV Continuous <Continuous>  dextrose 5%. 1000 milliLiter(s) IV Continuous <Continuous>  sodium chloride 0.9%. 500 milliLiter(s) IV Continuous <Continuous>      	    [PHYSICAL EXAM:  TELEMETRY:  T(C): 36.7 (08-02-23 @ 04:58), Max: 37.2 (08-01-23 @ 20:35)  HR: 118 (08-02-23 @ 04:58) (88 - 118)  BP: 105/59 (08-02-23 @ 04:58) (105/59 - 143/73)  RR: 18 (08-02-23 @ 04:58) (18 - 18)  SpO2: 98% (08-02-23 @ 04:58) (97% - 99%)  Wt(kg): --  I&O's Summary    01 Aug 2023 07:01  -  02 Aug 2023 07:00  --------------------------------------------------------  IN: 1700 mL / OUT: 2100 mL / NET: -400 mL    02 Aug 2023 07:01  -  02 Aug 2023 11:03  --------------------------------------------------------  IN: 240 mL / OUT: 0 mL / NET: 240 mL        Cook:  Central/PICC/Mid Line:                                         Appearance: Normal	  HEENT:   Normal oral mucosa, PERRL, EOMI	  Neck: Supple, + JVD/ - JVD; Carotid Bruit   Cardiovascular: Normal S1 S2, No JVD, No murmurs,   Respiratory: Lungs clear to auscultation/Decreased Breath Sounds/No Rales, Rhonchi, Wheezing	  Gastrointestinal:  Soft, Non-tender, + BS	  Skin: No rashes, No ecchymoses, No cyanosis  Extremities: Normal range of motion, No clubbing, cyanosis or edema  Vascular: Peripheral pulses palpable 2+ bilaterally  Neurologic: Non-focal  Psychiatry: A & O x 3, Mood & affect appropriate      	    ECG:  	  RADIOLOGY:   DIAGNOSTIC TESTING:  [ ] Echocardiogram:  [ ]  Catheterization:  [ ] Stress Test:    [ ] AILYN  OTHER: 	    LABS:	 	  CARDIAC MARKERS:                                  10.8   12.61 )-----------( 486      ( 02 Aug 2023 05:30 )             36.6     08-02    133<L>  |  102  |  18  ----------------------------<  534<HH>  4.9   |  18<L>  |  0.72    Ca    8.8      02 Aug 2023 05:30  Phos  2.0     08-02  Mg     1.8     08-02    TPro  6.3  /  Alb  2.8<L>  /  TBili  <0.2  /  DBili  x   /  AST  11  /  ALT  9<L>  /  AlkPhos  148<H>  08-01    proBNP:   Lipid Profile:   HgA1c:   TSH:   PT/INR - ( 01 Aug 2023 05:30 )   PT: 11.1 sec;   INR: 0.97          PTT - ( 01 Aug 2023 05:30 )  PTT:31.3 sec    ASSESSMENT/PLAN: 	        DVT ppx:  Dispo:     Interventional Cardiology PA Adult Progress Note    C.C.: Cardiac cath    Subjective Assessment: Pt endorsing pulsating pain at site of R wrist. Pt also endorsing on and off chest discomfort and persistent nausea. Pt requesting more pain meds.       ROS Negative except as per Subjective and HPI  	  MEDICATIONS:  metoprolol succinate ER 50 milliGRAM(s) Oral every 24 hours    linezolid    Tablet 600 milliGRAM(s) Oral every 12 hours    albuterol/ipratropium for Nebulization 3 milliLiter(s) Nebulizer every 6 hours  diphenhydrAMINE Injectable 50 milliGRAM(s) IV Push once PRN    acetaminophen     Tablet .. 650 milliGRAM(s) Oral every 6 hours PRN  gabapentin 600 milliGRAM(s) Oral every 8 hours  oxyCODONE    IR 10 milliGRAM(s) Oral every 12 hours PRN  oxyCODONE    IR 5 milliGRAM(s) Oral every 6 hours PRN    pantoprazole    Tablet 40 milliGRAM(s) Oral before breakfast    atorvastatin 80 milliGRAM(s) Oral at bedtime  dextrose 50% Injectable 12.5 Gram(s) IV Push once  dextrose 50% Injectable 25 Gram(s) IV Push once  dextrose 50% Injectable 25 Gram(s) IV Push once  dextrose Oral Gel 15 Gram(s) Oral once PRN  glucagon  Injectable 1 milliGRAM(s) IntraMuscular once  insulin glargine Injectable (LANTUS) 50 Unit(s) SubCutaneous at bedtime  insulin glargine Injectable (LANTUS) 50 Unit(s) SubCutaneous every morning  insulin lispro (ADMELOG) corrective regimen sliding scale   SubCutaneous Before meals and at bedtime  insulin lispro Injectable (ADMELOG) 25 Unit(s) SubCutaneous three times a day before meals    clopidogrel Tablet 75 milliGRAM(s) Oral daily  clotrimazole 2% Vaginal Cream 1 Applicatorful Vaginal daily  dextrose 5%. 1000 milliLiter(s) IV Continuous <Continuous>  dextrose 5%. 1000 milliLiter(s) IV Continuous <Continuous>  sodium chloride 0.9%. 500 milliLiter(s) IV Continuous <Continuous>      	    [PHYSICAL EXAM:  TELEMETRY:  T(C): 36.7 (08-02-23 @ 04:58), Max: 37.2 (08-01-23 @ 20:35)  HR: 118 (08-02-23 @ 04:58) (88 - 118)  BP: 105/59 (08-02-23 @ 04:58) (105/59 - 143/73)  RR: 18 (08-02-23 @ 04:58) (18 - 18)  SpO2: 98% (08-02-23 @ 04:58) (97% - 99%)  Wt(kg): --  I&O's Summary    01 Aug 2023 07:01  -  02 Aug 2023 07:00  --------------------------------------------------------  IN: 1700 mL / OUT: 2100 mL / NET: -400 mL    02 Aug 2023 07:01  -  02 Aug 2023 11:03  --------------------------------------------------------  IN: 240 mL / OUT: 0 mL / NET: 240 mL                                         Appearance: Normal	  HEENT:   Normal oral mucosa, PERRL, EOMI	  Neck: Supple  Cardiovascular: Normal S1 S2, No murmurs  Respiratory: Lungs clear to auscultation	  Gastrointestinal:  Soft, Non-tender, + BS	  Skin: No rashes, No ecchymoses, No cyanosis  Extremities: Normal range of motion, B/L lower extremity non pitting edema  Vascular: Trace palpable pulses, extremities warm and well perfused   Neurologic: Pt has L lower extremity weakness/foot drop (at baseline s/p CVA)  Psychiatry: A & O x 3, Mood & affect appropriate          LABS:	 	  CARDIAC MARKERS:                                  10.8   12.61 )-----------( 486      ( 02 Aug 2023 05:30 )             36.6     08-02    133<L>  |  102  |  18  ----------------------------<  534<HH>  4.9   |  18<L>  |  0.72    Ca    8.8      02 Aug 2023 05:30  Phos  2.0     08-02  Mg     1.8     08-02    TPro  6.3  /  Alb  2.8<L>  /  TBili  <0.2  /  DBili  x   /  AST  11  /  ALT  9<L>  /  AlkPhos  148<H>  08-01    proBNP:   Lipid Profile:   HgA1c:   TSH:   PT/INR - ( 01 Aug 2023 05:30 )   PT: 11.1 sec;   INR: 0.97          PTT - ( 01 Aug 2023 05:30 )  PTT:31.3 sec    ASSESSMENT/PLAN: 	        DVT ppx:  Dispo:

## 2023-08-02 NOTE — OCCUPATIONAL THERAPY INITIAL EVALUATION ADULT - MODIFIED CLINICAL TEST OF SENSORY INTEGRATION IN BALANCE TEST
Pt performed functional mobility approx 15 ft x 2 with no device (furniture cruising on bed rail and chairs in room) with CGA (provided for safety due to impaired balance despite pt declining CGA from therapist). Educated pt on benefits of use of rollator for safety and balance however pt continued to adamantly decline.

## 2023-08-02 NOTE — DISCHARGE NOTE PROVIDER - NSDCFUSCHEDAPPT_GEN_ALL_CORE_FT
Rowdy Oswald  [facility] Physician Counts include 234 beds at the [facility]  [facility] [address]  Scheduled Appointment: 08/08/2023

## 2023-08-02 NOTE — DISCHARGE NOTE PROVIDER - NSDCMRMEDTOKEN_GEN_ALL_CORE_FT
acetaminophen 325 mg oral tablet: 3 tab(s) orally every 8 hours  Admelog 100 units/mL injectable solution: 30 unit(s) injectable 3 times a day (with meals)  apixaban 5 mg oral tablet: 1 tab(s) orally every 12 hours  atorvastatin 40 mg oral tablet: 1 tab(s) orally once a day (at bedtime)  Cardiac Rehab: Cardiac Rehab: 3x/week for a total of 12 weeks. Diagnosis: CAD s/p PCI placement.  clopidogrel 75 mg oral tablet: 1 tab(s) orally once a day  gabapentin 600 mg oral tablet: 1 tab(s) orally 3 times a day  HumuLIN R (Concentrated) 500 units/mL subcutaneous solution: 50 unit(s) subcutaneous 3 times a day  ibuprofen 800 mg oral tablet: 1 tab(s) orally 3 times a day  lactobacillus acidophilus oral capsule: 1 cap(s) orally once a day  metoprolol succinate 50 mg oral tablet, extended release: 1 tab(s) orally once a day  oxyBUTYnin 5 mg oral tablet: 1 tab(s) orally every 8 hours as needed for Bladder spasms Take 1 tablet every 8 hours  oxyCODONE 5 mg oral tablet: 1 tab(s) orally every 6 hours as needed for  severe pain MDD: 4 tabs  Ventolin HFA 90 mcg/inh inhalation aerosol: 1 puff(s) inhaled as needed.    acetaminophen 325 mg oral tablet: 3 tab(s) orally every 8 hours  Admelog 100 units/mL injectable solution: 26 unit(s) injectable 3 times a day (with meals)  apixaban 5 mg oral tablet: 1 tab(s) orally every 12 hours  atorvastatin 80 mg oral tablet: 1 tab(s) orally once a day (at bedtime)  Cardiac Rehab: Cardiac Rehab: 3x/week for a total of 12 weeks. Diagnosis: CAD s/p PCI placement.  Cardiac Rehab: Cardiac Rehab: 3x/week for a total of 12 weeks. Diagnosis: CAD s/p PCI placement.  clopidogrel 75 mg oral tablet: 1 tab(s) orally once a day  clotrimazole 2% vaginal cream with applicator: 1 applicatorful vaginal once a day complete treatment after 1 more dose.  gabapentin 600 mg oral tablet: 1 tab(s) orally 3 times a day  HumuLIN R (Concentrated) 500 units/mL subcutaneous solution: 60 unit(s) subcutaneous 3 times a day  lactobacillus acidophilus oral capsule: 1 cap(s) orally once a day  linezolid 600 mg oral tablet: 1 tab(s) orally every 12 hours  metoprolol succinate 50 mg oral tablet, extended release: 1 tab(s) orally once a day  oxyCODONE 5 mg oral tablet: 1 tab(s) orally every 6 hours as needed for  severe pain MDD: 4 tabs  pantoprazole 40 mg oral delayed release tablet: 1 tab(s) orally once a day (before a meal)

## 2023-08-02 NOTE — PROGRESS NOTE ADULT - NS ATTEND AMEND GEN_ALL_CORE FT
#L labial abscess, L inguinal abscess, cellulitis, UTI    Patient c/o L labian and groin pain and said she didn't get any pain med.  Packing removed yesterday, erythema minimal, no purulent discharge.   7/30 L labial abscess culture grew MSSA, staph haemolyticus and E. faecium.  7/30 L inguinal abscess grew MSSA.  7/28 L groin abscess grew MSSA.  7/27 UCx grew 100k MSSA and E faecalis.   All BCx ngtd.  Cont linezolid 600mg PO q12h to cover all bacterial.  Duration is 7 days from I&D, end date 8/5.    Thank you for your consult.  Please re-consult us or call us with questions.  Case d/w primary team.    Stephanie Brandon MD, MS  Infectious Disease attending  work cell [phone]  For any questions during evening/weekend/holiday, please page ID on call

## 2023-08-02 NOTE — PROGRESS NOTE ADULT - PROBLEM SELECTOR PLAN 8
.   Continue with atorvastatin 80mg at bedtime.   consider starting zetia 10mg outpatient    F: None  E: Replete if K<4 or Mag<2  N: DASH Diet, carb consistent  GIppx: PPI   VTEppx: Eliquis  Dispo: d/c home tomorrow

## 2023-08-02 NOTE — PROGRESS NOTE ADULT - PROBLEM SELECTOR PLAN 4
HhE0C=20.9  -endo consulted, continue lantus 50 units BID, lispro 25 units prior to each meals (3x per day)  -c/w above as well with MISS  -fingerstick goal is 100-180mg/dL  -for discharge, recommend Humalog U 500 60 units TID and admelog 26 units TID.

## 2023-08-02 NOTE — OCCUPATIONAL THERAPY INITIAL EVALUATION ADULT - ADDITIONAL COMMENTS
Pt lives with her kids and fiance in an elevator access apartment with 0STE. Pt reports that prior to admission, pt was requires assist from fiance/children for ADLs (LB Dressing, bathing, tub transfers, and toilet transfers) requires assist for IADLs, and ambulates with rollator and L AFO. Pt reports she was "supposed" to have PT/OT recently however "no one ever showed up". Pt has a bathtub shower with shower chair. Pt also has a bedside commode. Pt is R hand dominant. Pt also owns a w/c.

## 2023-08-02 NOTE — DISCHARGE NOTE PROVIDER - NSDCFUADDAPPT_GEN_ALL_CORE_FT
You can follow up at discharge with Dr Joseph [facility] Physician [facility] by calling [phone] to make an appointment.

## 2023-08-02 NOTE — CONSULT NOTE ADULT - ATTENDING COMMENTS
Patient is a 42 yo F w/ contrast allergy PMH CAD s/p PCI, pending Staged LCx/Om intervention, HTN, HLD, poorly controlled DM-II, CVA (11/2021, residual LE weakness L>R), PE 4/2023 (on Eliquis), Asthma, abd necrotizing fasciitis s/p suprapubic catheter and ostomy and frequent UTIs 2/2 suprapubic catheter w/ recent hospitalization for Urosepsis  readmitted for management of cellulitis with concern for left groin abscess, s/p ID with clinical improvement. Cardiology consulted for Stable Angina    Review of Studies:  - ECG 7/30/23: NSR @82 bpm (unchanged from prior admission)  - TTE 6/21/23: Mild symmetric LVH. LVIDd 3.2cm. Hyperdynamic LVEF. Normal RV size and systolic function. Normal atria. No significant valvular disease. No pericardial effusion.   - CCTA 6/23: Ca score 129 (accuracy of score is limited by image noise), probable severe pRCA stenosis, mod D2 stenosis, non obstructive in remaining coronaries.   - LHC 06/26/23 R Ulnar: Normal LM, 40-50% mLAD, D1 mild disease,  70% dLCX/OM1 stenosis, 85% pRCA and 80% RPLS. Intervention: IVUS guided NAT pRCA (85%) and NAT RPLS (80%); dLCx/OM1 70%, mLAD 50%. LVEDP 17.     # Stable Angina  # CVA  # DVT/PE      #Stable Angina in patient with Known CAD  - Patient with chest discomfort with exertion, reportedly improved since last PCI however with persistent discomfort  - Reports compliance with Eliquis and Plavix.   - ECG showing NSR without ischemic changes and unchanged from prior  - Given known CAD with recent PCI, would resume Eliquis and continue with Plavix 75 mg po daily  - Would increase  Lipitor to  80mg QD  - Continue Toprol 50mg QD (hold HR <60, SBP <100). Can uptitrate HR permitting for maximal anti anginal effects  - Will discuss with IC team timing of staged intervention    # CVA  - Cont Plavix 75 mg daily.  - Increase Lipitor to 80 mg daily    # DVT/PE  - Cont with Eliquis 5 mg PO BID
42 y/o female with PMHx Asthma, CVA (11/2021; residual L>R weakness), PE (4/2023 on Eliquis), uncontrolled DM-2 (on insulin), HTN, HLD, hx abdominal necrotizing fasciitis ( ostomy placement in 11/2021 with intubation from 11-12/2021), hx of frequent UTIs 2/2 to suprapubic catheter, most recent UTI c/b Urosepsis suprapubic catheter removed, CAD required PCI- presenting due to general malaise with c/f recurrent UTI, and CP admitted for management of cellulitis. Now s/p additional NAT 8/1.    pt seen with Dr. Riddle, she is very pleasant and related the events.  tolerated physical therapy this morning.  extensive conversation about medication/dietary education for DM   labs /notes reviewed  DM with hyperglycemia from infection and also from steroids ( prep for contrast allergy ) antibiotics as per ID   she follows with Endo Dr. Joseph and would like to continue follow up with Dr. Joseph , endocrine evaluation optimization of glucose before discharges  she is interested in reversal of colostomy ( given recent PCI ) would not pursue now =-would need follow up with surgery as outpatient.  chronic pain -referral made to pain management doctor during last discharge- she did not get there yet, now on Gabapentin 600 mg tid and getting prn oxycodone -to refer to pain management.    thank you for allowing medicine to participate in the care, dw Dr. Riddle, cardiology  Dr. Enrico Galeas covering 8/3--

## 2023-08-02 NOTE — OCCUPATIONAL THERAPY INITIAL EVALUATION ADULT - PERTINENT HX OF CURRENT PROBLEM, REHAB EVAL
42 y/o female with PMHx Asthma, CVA (11/2021; residual L>R weakness), PE (4/2023 on Eliquis), uncontrolled DM-2 (on insulin), HTN, HLD, hx abdominal necrotizing fasciitis ( ostomy placement in 11/2021 with intubation from 11-12/2021), hx of frequent UTIs 2/2 to suprapubic catheter, most recent UTI c/b Urosepsis and discharged with midline for Ertapenem, recent cardiac stents now presenting due to generally not feeling well and concern for recurrent UTI, admitted for management of cellulitis. Patient is s/p I & D with sx team for abscess. ID following for abx management. Patient stable from abscess and bacteruria standpoint. Patient now s/p staged PCI 8/1/23 LCx/OM1 (70% stenosis) and transferred to cardiology/telemetry for further management.

## 2023-08-02 NOTE — DIETITIAN INITIAL EVALUATION ADULT - PROBLEM SELECTOR PLAN 1
Pt reports a few days ago, she noticed a pimple to her left labia, which now has progressed to swelling, erythema, tenderness extending from the L labia to inner thigh. States she initially noted purulent and bloody drainage. On physical exam, no purulent drainage noted, however significant area of erythema and warmth  -s/p Ertapenem 1g in the ED, will plan continued treatment with Cefazolin 1g q8h  -if swelling is not improving, can consider CT scan to r/o abscess  -f/u BCx

## 2023-08-02 NOTE — PROGRESS NOTE ADULT - SUBJECTIVE AND OBJECTIVE BOX
INFECTIOUS DISEASES CONSULT FOLLOW-UP NOTE    INTERVAL HPI/OVERNIGHT EVENTS:      ROS:   Constitutional, eyes, ENT, cardiovascular, respiratory, gastrointestinal, genitourinary, integumentary, neurological, psychiatric and heme/lymph are otherwise negative other than noted above       ANTIBIOTICS/RELEVANT:    MEDICATIONS  (STANDING):  albuterol/ipratropium for Nebulization 3 milliLiter(s) Nebulizer every 6 hours  atorvastatin 80 milliGRAM(s) Oral at bedtime  clopidogrel Tablet 75 milliGRAM(s) Oral daily  clotrimazole 2% Vaginal Cream 1 Applicatorful Vaginal daily  dextrose 5%. 1000 milliLiter(s) (50 mL/Hr) IV Continuous <Continuous>  dextrose 5%. 1000 milliLiter(s) (100 mL/Hr) IV Continuous <Continuous>  dextrose 50% Injectable 25 Gram(s) IV Push once  dextrose 50% Injectable 12.5 Gram(s) IV Push once  dextrose 50% Injectable 25 Gram(s) IV Push once  gabapentin 600 milliGRAM(s) Oral every 8 hours  glucagon  Injectable 1 milliGRAM(s) IntraMuscular once  insulin glargine Injectable (LANTUS) 50 Unit(s) SubCutaneous every morning  insulin glargine Injectable (LANTUS) 50 Unit(s) SubCutaneous at bedtime  insulin lispro (ADMELOG) corrective regimen sliding scale   SubCutaneous Before meals and at bedtime  insulin lispro Injectable (ADMELOG) 25 Unit(s) SubCutaneous three times a day before meals  linezolid    Tablet 600 milliGRAM(s) Oral every 12 hours  metoprolol succinate ER 50 milliGRAM(s) Oral every 24 hours  pantoprazole    Tablet 40 milliGRAM(s) Oral before breakfast  sodium chloride 0.9%. 500 milliLiter(s) (75 mL/Hr) IV Continuous <Continuous>    MEDICATIONS  (PRN):  acetaminophen     Tablet .. 650 milliGRAM(s) Oral every 6 hours PRN Temp greater or equal to 38C (100.4F), Mild Pain (1 - 3)  dextrose Oral Gel 15 Gram(s) Oral once PRN Blood Glucose LESS THAN 70 milliGRAM(s)/deciliter  diphenhydrAMINE Injectable 50 milliGRAM(s) IV Push once PRN Allergy symptoms  oxyCODONE    IR 5 milliGRAM(s) Oral every 6 hours PRN Severe Pain (7 - 10)  oxyCODONE    IR 10 milliGRAM(s) Oral every 12 hours PRN breakthrough pain        Vital Signs Last 24 Hrs  T(C): 36.7 (02 Aug 2023 04:58), Max: 37.2 (01 Aug 2023 20:35)  T(F): 98 (02 Aug 2023 04:58), Max: 98.9 (01 Aug 2023 20:35)  HR: 118 (02 Aug 2023 04:58) (88 - 118)  BP: 105/59 (02 Aug 2023 04:58) (105/59 - 143/73)  BP(mean): 119 (02 Aug 2023 04:58) (101 - 119)  RR: 18 (02 Aug 2023 04:58) (18 - 18)  SpO2: 98% (02 Aug 2023 04:58) (97% - 99%)    Parameters below as of 02 Aug 2023 04:58  Patient On (Oxygen Delivery Method): room air        08-01-23 @ 07:01  -  08-02-23 @ 07:00  --------------------------------------------------------  IN: 1700 mL / OUT: 2100 mL / NET: -400 mL    08-02-23 @ 07:01  -  08-02-23 @ 11:17  --------------------------------------------------------  IN: 240 mL / OUT: 0 mL / NET: 240 mL      PHYSICAL EXAM:  Constitutional: alert, NAD  Eyes: the sclera and conjunctiva were normal.   ENT: the ears and nose were normal in appearance.   Neck: the appearance of the neck was normal and the neck was supple.   Pulmonary: no respiratory distress and lungs were clear to auscultation bilaterally.   Heart: heart rate was normal and rhythm regular, normal S1 and S2  Vascular:. there was no peripheral edema  Abdomen: normal bowel sounds, soft, non-tender  Neurological: no focal deficits.   Psychiatric: the affect was normal        LABS:                        10.8   12.61 )-----------( 486      ( 02 Aug 2023 05:30 )             36.6     08-02    133<L>  |  102  |  18  ----------------------------<  534<HH>  4.9   |  18<L>  |  0.72    Ca    8.8      02 Aug 2023 05:30  Phos  2.0     08-02  Mg     1.8     08-02    TPro  6.3  /  Alb  2.8<L>  /  TBili  <0.2  /  DBili  x   /  AST  11  /  ALT  9<L>  /  AlkPhos  148<H>  08-01    PT/INR - ( 01 Aug 2023 05:30 )   PT: 11.1 sec;   INR: 0.97          PTT - ( 01 Aug 2023 05:30 )  PTT:31.3 sec  Urinalysis Basic - ( 02 Aug 2023 05:30 )    Color: x / Appearance: x / SG: x / pH: x  Gluc: 534 mg/dL / Ketone: x  / Bili: x / Urobili: x   Blood: x / Protein: x / Nitrite: x   Leuk Esterase: x / RBC: x / WBC x   Sq Epi: x / Non Sq Epi: x / Bacteria: x        MICROBIOLOGY:      RADIOLOGY & ADDITIONAL STUDIES:  Reviewed INFECTIOUS DISEASES CONSULT FOLLOW-UP NOTE    INTERVAL HPI/OVERNIGHT EVENTS:    Patient seen and examined at bedside. FAUSTINO. Continues to endorse pain however notes that pain near I&D site is improved     ROS:   Constitutional, eyes, ENT, cardiovascular, respiratory, gastrointestinal, genitourinary, integumentary, neurological, psychiatric and heme/lymph are otherwise negative other than noted above       ANTIBIOTICS/RELEVANT:    MEDICATIONS  (STANDING):  albuterol/ipratropium for Nebulization 3 milliLiter(s) Nebulizer every 6 hours  atorvastatin 80 milliGRAM(s) Oral at bedtime  clopidogrel Tablet 75 milliGRAM(s) Oral daily  clotrimazole 2% Vaginal Cream 1 Applicatorful Vaginal daily  dextrose 5%. 1000 milliLiter(s) (50 mL/Hr) IV Continuous <Continuous>  dextrose 5%. 1000 milliLiter(s) (100 mL/Hr) IV Continuous <Continuous>  dextrose 50% Injectable 25 Gram(s) IV Push once  dextrose 50% Injectable 12.5 Gram(s) IV Push once  dextrose 50% Injectable 25 Gram(s) IV Push once  gabapentin 600 milliGRAM(s) Oral every 8 hours  glucagon  Injectable 1 milliGRAM(s) IntraMuscular once  insulin glargine Injectable (LANTUS) 50 Unit(s) SubCutaneous every morning  insulin glargine Injectable (LANTUS) 50 Unit(s) SubCutaneous at bedtime  insulin lispro (ADMELOG) corrective regimen sliding scale   SubCutaneous Before meals and at bedtime  insulin lispro Injectable (ADMELOG) 25 Unit(s) SubCutaneous three times a day before meals  linezolid    Tablet 600 milliGRAM(s) Oral every 12 hours  metoprolol succinate ER 50 milliGRAM(s) Oral every 24 hours  pantoprazole    Tablet 40 milliGRAM(s) Oral before breakfast  sodium chloride 0.9%. 500 milliLiter(s) (75 mL/Hr) IV Continuous <Continuous>    MEDICATIONS  (PRN):  acetaminophen     Tablet .. 650 milliGRAM(s) Oral every 6 hours PRN Temp greater or equal to 38C (100.4F), Mild Pain (1 - 3)  dextrose Oral Gel 15 Gram(s) Oral once PRN Blood Glucose LESS THAN 70 milliGRAM(s)/deciliter  diphenhydrAMINE Injectable 50 milliGRAM(s) IV Push once PRN Allergy symptoms  oxyCODONE    IR 5 milliGRAM(s) Oral every 6 hours PRN Severe Pain (7 - 10)  oxyCODONE    IR 10 milliGRAM(s) Oral every 12 hours PRN breakthrough pain        Vital Signs Last 24 Hrs  T(C): 36.7 (02 Aug 2023 04:58), Max: 37.2 (01 Aug 2023 20:35)  T(F): 98 (02 Aug 2023 04:58), Max: 98.9 (01 Aug 2023 20:35)  HR: 118 (02 Aug 2023 04:58) (88 - 118)  BP: 105/59 (02 Aug 2023 04:58) (105/59 - 143/73)  BP(mean): 119 (02 Aug 2023 04:58) (101 - 119)  RR: 18 (02 Aug 2023 04:58) (18 - 18)  SpO2: 98% (02 Aug 2023 04:58) (97% - 99%)    Parameters below as of 02 Aug 2023 04:58  Patient On (Oxygen Delivery Method): room air        08-01-23 @ 07:01  -  08-02-23 @ 07:00  --------------------------------------------------------  IN: 1700 mL / OUT: 2100 mL / NET: -400 mL    08-02-23 @ 07:01  -  08-02-23 @ 11:17  --------------------------------------------------------  IN: 240 mL / OUT: 0 mL / NET: 240 mL      PHYSICAL EXAM:  Constitutional: alert, NAD  Eyes: the sclera and conjunctiva were normal.   ENT: the ears and nose were normal in appearance.   Neck: the appearance of the neck was normal and the neck was supple.   Pulmonary: no respiratory distress and lungs were clear to auscultation bilaterally.   Heart: heart rate was normal and rhythm regular, normal S1 and S2  Vascular:. there was no peripheral edema  Abdomen: normal bowel sounds, soft, non-tender  Derm: L labia I&D site -packing removed. Wound without erythema, swelling, purulence   Neurological: no focal deficits.   Psychiatric: the affect was normal        LABS:                        10.8   12.61 )-----------( 486      ( 02 Aug 2023 05:30 )             36.6     08-02    133<L>  |  102  |  18  ----------------------------<  534<HH>  4.9   |  18<L>  |  0.72    Ca    8.8      02 Aug 2023 05:30  Phos  2.0     08-02  Mg     1.8     08-02    TPro  6.3  /  Alb  2.8<L>  /  TBili  <0.2  /  DBili  x   /  AST  11  /  ALT  9<L>  /  AlkPhos  148<H>  08-01    PT/INR - ( 01 Aug 2023 05:30 )   PT: 11.1 sec;   INR: 0.97          PTT - ( 01 Aug 2023 05:30 )  PTT:31.3 sec  Urinalysis Basic - ( 02 Aug 2023 05:30 )    Color: x / Appearance: x / SG: x / pH: x  Gluc: 534 mg/dL / Ketone: x  / Bili: x / Urobili: x   Blood: x / Protein: x / Nitrite: x   Leuk Esterase: x / RBC: x / WBC x   Sq Epi: x / Non Sq Epi: x / Bacteria: x        MICROBIOLOGY:  reviewed    RADIOLOGY & ADDITIONAL STUDIES:  Reviewed

## 2023-08-02 NOTE — PROGRESS NOTE ADULT - ASSESSMENT
43F h/o CVA in 11/2021 with residual L>R weakness, PE on eliquis, uncontrolled T2DM on insulin, abdominal necrotizing fasciitis s/p ostomy in 11/2021, frequent UTI 2/2 suprapubic cath now removed p/w feeling unwell and purulent drainage and pain at L labia, found to have L labial abscess and UTI. Urine culture 7/27 growing MSSA and E. faecalis. L labia abscess drainage culture 7/28 growing MSSA. L inguinal abscess I&D culture 7/30 growing MSSA. L labial abscess culture 7/30 growing Staph haemolyticus (S to Linezolid) and E. faecium (S to Linezolid). BCxs ngtd    Suggest:  -Cont Linezolid 600 mg PO q12h     -Would treat with 7d course from I&D (7/30-8/5)    Team 2 signing off. Thank you for your consultation   Please reconsult with questions   Case d/w primary team.  Final recommendation pending attending note.    Aide Mckeon, Infectious Diseases PA  Please reach out for any questions 9 am-5pm. For evenings and weekends, please call the ID physician on call.  Work cell: [phone]

## 2023-08-02 NOTE — OCCUPATIONAL THERAPY INITIAL EVALUATION ADULT - RANGE OF MOTION EXAMINATION, LOWER EXTREMITY
L hip/knee AROM WFL, however L Foot drop/Right LE Active ROM was WFL   (within functional limits)/Right LE Passive ROM was WFL  (within functional limits)

## 2023-08-02 NOTE — DIETITIAN NUTRITION RISK NOTIFICATION - TREATMENT: THE FOLLOWING DIET HAS BEEN RECOMMENDED
Diet, Consistent Carbohydrate w/Evening Snack:   No Fish  No Shellfish  Supplement Feeding Modality:  Oral  Glucerna Shake Cans or Servings Per Day:  1       Frequency:  Three Times a day (08-02-23 @ 16:30) [Pending Verification By Attending]  Diet, DASH/TLC:   Sodium & Cholesterol Restricted  Consistent Carbohydrate {No Snacks} (CSTCHO) (07-29-23 @ 08:55) [Active]

## 2023-08-02 NOTE — PROGRESS NOTE ADULT - NS ATTEND AMEND GEN_ALL_CORE FT
Patient is 44 yo woman with CAD s/p PCI in 06/23 in setting of NSTEMI with residual disease, IDDM, CVA, SPC, abdominal wall necrotizing fasciitis, PEs on AC, admitted with purulent collection in her left groin s/p I&D with Cx positive for MSSA and E. Faecium. followed by ID, with course notable for uncontrolled hyperglycemia, stable Angina in patient planned for staged PCI of the LCx/OM1, now s/p Successful PCI of the OM1, admitted to the Cardiology Services    - CAD s/p PCI: Given patient on Eliquis for Hx of DVT/PE, cont Eliquis tonight and Plavix 75 mg Po Daily.Pt is aware that should Eliquis be discontinued by her primary care for completion of DVT/PE treament, she should resume taking ASA 81 mg po daily along with Plavix. Cont with high dose statin,lipitor 80 mg po daily. Cont with Toprol 50 mg po daily   - Groin abscess; Patient s/p I&D, Cx positive for MSSA and E. Faecium, now transitioned to PO Linezolid with ID. Cont Linezolid 600 mg PO q12h with Anticipated 7d course from I&D (7/30-8/5)  - DM: Persistently Hyperglycemic throughout hospitalization. Endo consulted with final recommendation for  Anticipated DC on 8/3. Follow up established with Dr Joseph from Endocrinology.   - Patient will need outpatient Cardiology follow up at [facility] with Dr [place]. Anticipate DC on 8/3.

## 2023-08-02 NOTE — DIETITIAN INITIAL EVALUATION ADULT - PROBLEM SELECTOR PLAN 8
F: s/p 4L in the ED  E: replete K>4 Mg>2  N: consistent carb  D: eliquis  dispo: [facility]

## 2023-08-02 NOTE — DIETITIAN INITIAL EVALUATION ADULT - MALNUTRITION
Moderate malnutrition r/t acute illness AEB decline in intake <25% of usual intake x 7 days and weight loss of 5# x 1 week

## 2023-08-02 NOTE — CONSULT NOTE ADULT - ASSESSMENT
42 y/o female with PMHx Asthma, CVA (11/2021; residual L>R weakness), PE (4/2023 on Eliquis), uncontrolled DM-2 (on insulin), HTN, HLD, hx abdominal necrotizing fasciitis ( ostomy placement in 11/2021 with intubation from 11-12/2021), hx of frequent UTIs 2/2 to suprapubic catheter, most recent UTI c/b Urosepsis and discharged with midline for Ertapenem, recent cardiac stents now presenting due to general malaise with c/f recurrent UTI, and CP admitted for management of cellulitis. Now s/p additional NAT 8/1.     A1C: 11.9 %  BUN: 18  Creatinine: 0.72  GFR: 106  Weight: 101.2  BMI: 34.9 44 y/o female with PMHx Asthma, CVA (11/2021; residual L>R weakness), PE (4/2023 on Eliquis), uncontrolled DM-2 (on insulin), HTN, HLD, hx abdominal necrotizing fasciitis ( ostomy placement in 11/2021 with intubation from 11-12/2021), hx of frequent UTIs 2/2 to suprapubic catheter, most recent UTI c/b Urosepsis and discharged with midline for Ertapenem, recent cardiac stents now presenting due to general malaise with c/f recurrent UTI, and CP admitted for management of cellulitis, and now s/p additional NAT 8/1.     Endocrine consulted for uncontrolled type 2 diabetes with steroid induced hyperglycemia.  Hyperglycemia is multifactorial: steroids, infection, and diet.   Now that she is voiding freely with episodes of incontinence, elevated blood glucose likely contributing to current infection.  Would recommend GLP-1 as OP if appropriate. With current nausea it is not appropriate to start.    A1C: 11.9 %  BUN: 18  Creatinine: 0.72  GFR: 106  Weight: 101.2  BMI: 34.9

## 2023-08-02 NOTE — OCCUPATIONAL THERAPY INITIAL EVALUATION ADULT - MANUAL MUSCLE TESTING RESULTS, REHAB EVAL
RUE shoulder flexion grossly 3+/5, RUE elbow flexion/extension 3+/5 (limited due to pain from stent placement via RUE yesterday), R  strength 4-/5. L shoulder flexion 2-/5, L elbow flexion 3+/5, L elbow extension 3-/5, L  strength 3+/5. RLE grossly 3+/5 throughout, LLE hip flexion 3+/5, LLE knee flexion/extension 3+/5, L ankle 1+/5 throughout due to hx of L foot drop from previous CVA.

## 2023-08-02 NOTE — DISCHARGE NOTE PROVIDER - PROVIDER TOKENS
FREE:[LAST:[Dr. Rowdy Oswald [address], [place] [address]  [phone].],PHONE:[(   )    -],FAX:[(   )    -],SCHEDULEDAPPT:[08/08/2023],SCHEDULEDAPPTTIME:[12:00 PM]],PROVIDER:[TOKEN:[[id number]],SCHEDULEDAPPT:[08/09/2023],SCHEDULEDAPPTTIME:[08:15 AM]]

## 2023-08-02 NOTE — PROGRESS NOTE ADULT - PROBLEM SELECTOR PLAN 5
History of bilateral pulmonary emboli in segmental and subsegmental branches on the R and subsegmental branches on the L, diagnosed on CT chest during ED visit on 4/7/23. Home meds: Eliquis 5mg BID  -continue Eliquis 5mg BID

## 2023-08-02 NOTE — DIETITIAN INITIAL EVALUATION ADULT - ORAL INTAKE PTA/DIET HISTORY
Writer interviewed patient at bedside: patient reports eating only a few bites of food as she is very nauseous. No V/D/C reported (has colostomy). No pressure ulcers. No chewing or swallowing problems reported. Patient reports a decline in intake of <25% of usual intake x 7 days prior ot admission with 5# weight loss x 1 week r/t nausea and "Being sick". Can easily express and feed self. No complaints per the food. Typical intake includes mostly liquids per her report such as smoothies, etc but does like salads and vegetables "as lot". Writer provided education per DASH CST CHO diet with handouts given per My plate per diabetes . Reviewed sources of carbohydrates, lean proteins, healthy fats, and emergency measures per low BS levels with verbalized understanding and agreement with POC. Will add Glucerna 3x daily (220 calories and 10 grams protein per serving) which she agreed to as supplement per poor intake. Writer spoke with Maty Hook per poor intake, elevated BS and pended order for Glucerna; PA agreed to sign pending order for Glucerna. Allergies for shellfish/seafood confirmed and reflected in diet record.

## 2023-08-03 ENCOUNTER — TRANSCRIPTION ENCOUNTER (OUTPATIENT)
Age: 43
End: 2023-08-03

## 2023-08-03 VITALS
HEART RATE: 85 BPM | DIASTOLIC BLOOD PRESSURE: 65 MMHG | OXYGEN SATURATION: 97 % | RESPIRATION RATE: 18 BRPM | TEMPERATURE: 98 F | SYSTOLIC BLOOD PRESSURE: 130 MMHG

## 2023-08-03 LAB
ANION GAP SERPL CALC-SCNC: 12 MMOL/L — SIGNIFICANT CHANGE UP (ref 5–17)
BUN SERPL-MCNC: 18 MG/DL — SIGNIFICANT CHANGE UP (ref 7–23)
CALCIUM SERPL-MCNC: 8 MG/DL — LOW (ref 8.4–10.5)
CHLORIDE SERPL-SCNC: 104 MMOL/L — SIGNIFICANT CHANGE UP (ref 96–108)
CO2 SERPL-SCNC: 22 MMOL/L — SIGNIFICANT CHANGE UP (ref 22–31)
CREAT SERPL-MCNC: 0.73 MG/DL — SIGNIFICANT CHANGE UP (ref 0.5–1.3)
EGFR: 105 ML/MIN/1.73M2 — SIGNIFICANT CHANGE UP
GLUCOSE BLDC GLUCOMTR-MCNC: 135 MG/DL — HIGH (ref 70–99)
GLUCOSE BLDC GLUCOMTR-MCNC: 286 MG/DL — HIGH (ref 70–99)
GLUCOSE SERPL-MCNC: 317 MG/DL — HIGH (ref 70–99)
HCT VFR BLD CALC: 32.8 % — LOW (ref 34.5–45)
HGB BLD-MCNC: 9.9 G/DL — LOW (ref 11.5–15.5)
MAGNESIUM SERPL-MCNC: 1.5 MG/DL — LOW (ref 1.6–2.6)
MCHC RBC-ENTMCNC: 24.1 PG — LOW (ref 27–34)
MCHC RBC-ENTMCNC: 30.2 GM/DL — LOW (ref 32–36)
MCV RBC AUTO: 79.8 FL — LOW (ref 80–100)
NRBC # BLD: 0 /100 WBCS — SIGNIFICANT CHANGE UP (ref 0–0)
PLATELET # BLD AUTO: 366 K/UL — SIGNIFICANT CHANGE UP (ref 150–400)
POTASSIUM SERPL-MCNC: 4 MMOL/L — SIGNIFICANT CHANGE UP (ref 3.5–5.3)
POTASSIUM SERPL-SCNC: 4 MMOL/L — SIGNIFICANT CHANGE UP (ref 3.5–5.3)
RBC # BLD: 4.11 M/UL — SIGNIFICANT CHANGE UP (ref 3.8–5.2)
RBC # FLD: 15.8 % — HIGH (ref 10.3–14.5)
SODIUM SERPL-SCNC: 138 MMOL/L — SIGNIFICANT CHANGE UP (ref 135–145)
WBC # BLD: 9.37 K/UL — SIGNIFICANT CHANGE UP (ref 3.8–10.5)
WBC # FLD AUTO: 9.37 K/UL — SIGNIFICANT CHANGE UP (ref 3.8–10.5)

## 2023-08-03 PROCEDURE — 99233 SBSQ HOSP IP/OBS HIGH 50: CPT | Mod: GC

## 2023-08-03 PROCEDURE — 99239 HOSP IP/OBS DSCHRG MGMT >30: CPT

## 2023-08-03 PROCEDURE — 99232 SBSQ HOSP IP/OBS MODERATE 35: CPT

## 2023-08-03 RX ORDER — INSULIN LISPRO 100/ML
26 VIAL (ML) SUBCUTANEOUS
Qty: 25 | Refills: 3
Start: 2023-08-03 | End: 2023-11-30

## 2023-08-03 RX ORDER — MAGNESIUM OXIDE 400 MG ORAL TABLET 241.3 MG
400 TABLET ORAL ONCE
Refills: 0 | Status: COMPLETED | OUTPATIENT
Start: 2023-08-03 | End: 2023-08-03

## 2023-08-03 RX ORDER — INSULIN HUMAN 100 [IU]/ML
60 INJECTION, SOLUTION SUBCUTANEOUS
Qty: 30 | Refills: 0
Start: 2023-08-03 | End: 2023-09-01

## 2023-08-03 RX ORDER — INSULIN LISPRO 100/ML
30 VIAL (ML) SUBCUTANEOUS
Qty: 0 | Refills: 0 | DISCHARGE

## 2023-08-03 RX ORDER — ATORVASTATIN CALCIUM 80 MG/1
1 TABLET, FILM COATED ORAL
Qty: 30 | Refills: 11
Start: 2023-08-03 | End: 2024-07-27

## 2023-08-03 RX ORDER — LINEZOLID 600 MG/300ML
1 INJECTION, SOLUTION INTRAVENOUS
Qty: 5 | Refills: 0
Start: 2023-08-03 | End: 2023-08-04

## 2023-08-03 RX ORDER — ONDANSETRON 8 MG/1
4 TABLET, FILM COATED ORAL ONCE
Refills: 0 | Status: COMPLETED | OUTPATIENT
Start: 2023-08-03 | End: 2023-08-03

## 2023-08-03 RX ORDER — APIXABAN 2.5 MG/1
1 TABLET, FILM COATED ORAL
Qty: 60 | Refills: 3
Start: 2023-08-03 | End: 2023-11-30

## 2023-08-03 RX ORDER — INSULIN LISPRO 100/ML
36 VIAL (ML) SUBCUTANEOUS
Qty: 25 | Refills: 3 | DISCHARGE
Start: 2023-08-03 | End: 2023-11-30

## 2023-08-03 RX ORDER — GABAPENTIN 400 MG/1
1 CAPSULE ORAL
Qty: 90 | Refills: 0
Start: 2023-08-03 | End: 2023-09-01

## 2023-08-03 RX ORDER — APIXABAN 5 MG/1
0.5 TABLET, FILM COATED ORAL
Qty: 60 | Refills: 3 | DISCHARGE
Start: 2023-08-03 | End: 2023-11-30

## 2023-08-03 RX ORDER — INSULIN HUMAN 100 [IU]/ML
80 INJECTION, SOLUTION SUBCUTANEOUS
Qty: 30 | Refills: 0 | DISCHARGE
Start: 2023-08-03 | End: 2023-09-01

## 2023-08-03 RX ORDER — METOPROLOL TARTRATE 50 MG
1 TABLET ORAL
Qty: 30 | Refills: 2
Start: 2023-08-03 | End: 2023-10-31

## 2023-08-03 RX ORDER — PANTOPRAZOLE SODIUM 20 MG/1
1 TABLET, DELAYED RELEASE ORAL
Qty: 30 | Refills: 2
Start: 2023-08-03 | End: 2023-10-31

## 2023-08-03 RX ORDER — IBUPROFEN 200 MG
1 TABLET ORAL
Refills: 0 | DISCHARGE

## 2023-08-03 RX ORDER — INSULIN HUMAN 100 [IU]/ML
50 INJECTION, SOLUTION SUBCUTANEOUS
Qty: 0 | Refills: 0 | DISCHARGE

## 2023-08-03 RX ORDER — GABAPENTIN 400 MG/1
1 CAPSULE ORAL
Refills: 0 | DISCHARGE

## 2023-08-03 RX ORDER — CLOPIDOGREL BISULFATE 75 MG/1
1 TABLET, FILM COATED ORAL
Qty: 30 | Refills: 11
Start: 2023-08-03 | End: 2024-07-27

## 2023-08-03 RX ORDER — MAGNESIUM SULFATE 500 MG/ML
2 VIAL (ML) INJECTION ONCE
Refills: 0 | Status: COMPLETED | OUTPATIENT
Start: 2023-08-03 | End: 2023-08-03

## 2023-08-03 RX ADMIN — GABAPENTIN 600 MILLIGRAM(S): 400 CAPSULE ORAL at 16:24

## 2023-08-03 RX ADMIN — OXYCODONE HYDROCHLORIDE 10 MILLIGRAM(S): 5 TABLET ORAL at 02:15

## 2023-08-03 RX ADMIN — PANTOPRAZOLE SODIUM 40 MILLIGRAM(S): 20 TABLET, DELAYED RELEASE ORAL at 06:31

## 2023-08-03 RX ADMIN — OXYCODONE HYDROCHLORIDE 10 MILLIGRAM(S): 5 TABLET ORAL at 16:24

## 2023-08-03 RX ADMIN — CLOPIDOGREL BISULFATE 75 MILLIGRAM(S): 75 TABLET, FILM COATED ORAL at 16:23

## 2023-08-03 RX ADMIN — Medication 25 GRAM(S): at 10:56

## 2023-08-03 RX ADMIN — OXYCODONE HYDROCHLORIDE 10 MILLIGRAM(S): 5 TABLET ORAL at 01:06

## 2023-08-03 RX ADMIN — OXYCODONE HYDROCHLORIDE 10 MILLIGRAM(S): 5 TABLET ORAL at 17:20

## 2023-08-03 RX ADMIN — MAGNESIUM OXIDE 400 MG ORAL TABLET 400 MILLIGRAM(S): 241.3 TABLET ORAL at 10:56

## 2023-08-03 RX ADMIN — Medication 1 APPLICATORFUL: at 16:30

## 2023-08-03 RX ADMIN — Medication 25 UNIT(S): at 08:45

## 2023-08-03 RX ADMIN — LINEZOLID 600 MILLIGRAM(S): 600 INJECTION, SOLUTION INTRAVENOUS at 06:31

## 2023-08-03 RX ADMIN — OXYCODONE HYDROCHLORIDE 5 MILLIGRAM(S): 5 TABLET ORAL at 11:55

## 2023-08-03 RX ADMIN — ONDANSETRON 4 MILLIGRAM(S): 8 TABLET, FILM COATED ORAL at 14:27

## 2023-08-03 RX ADMIN — Medication 6: at 08:45

## 2023-08-03 RX ADMIN — GABAPENTIN 600 MILLIGRAM(S): 400 CAPSULE ORAL at 06:31

## 2023-08-03 RX ADMIN — INSULIN GLARGINE 50 UNIT(S): 100 INJECTION, SOLUTION SUBCUTANEOUS at 08:46

## 2023-08-03 RX ADMIN — Medication 50 MILLIGRAM(S): at 16:24

## 2023-08-03 RX ADMIN — OXYCODONE HYDROCHLORIDE 5 MILLIGRAM(S): 5 TABLET ORAL at 10:56

## 2023-08-03 RX ADMIN — LINEZOLID 600 MILLIGRAM(S): 600 INJECTION, SOLUTION INTRAVENOUS at 17:39

## 2023-08-03 RX ADMIN — Medication 3 MILLILITER(S): at 10:55

## 2023-08-03 NOTE — PROGRESS NOTE ADULT - PROBLEM SELECTOR PROBLEM 1
Diabetes mellitus
Cellulitis
Cellulitis
CAD (coronary artery disease)
Cellulitis
CAD (coronary artery disease)
Cellulitis
Cellulitis

## 2023-08-03 NOTE — PROGRESS NOTE ADULT - PROBLEM SELECTOR PLAN 1
Type 2 diabetes mellitus with hyperglycemia  - Please increase lantus to 55 units BID  - Increase lispro to 30 units before each meal.  - Continue lispro moderate dose sliding scale before meals and at bedtime.  - Patient's fingerstick glucose goal is 100-180 mg/dL.    - For discharge, patient can Humlaog U 500  60 units TID and admelog 26 units TID.  - [facility] by calling [phone] to make an appointment.      Case discussed with Dr. Bowden. Primary team update Type 2 diabetes mellitus with hyperglycemia  - Please continue lantus to 50 units BID  - Continue lispro to 25 units before each meal.  - Continue lispro moderate dose sliding scale before meals and at bedtime.  - Patient's fingerstick glucose goal is 100-180 mg/dL.    - For discharge, patient can Humlaog U 500  60 units TID and admelog 26 units TID.  - [facility] by calling [phone] to make an appointment.      Case discussed with Dr. Bowdne. Primary team update

## 2023-08-03 NOTE — PROGRESS NOTE ADULT - SUBJECTIVE AND OBJECTIVE BOX
SUBJECTIVE / INTERVAL HPI: Patient was seen and examined this morning.  Reports pain after dressing change to labia this morning. Nausea has improved. Ate dinner last night (her usual - rice, green beans, salad) and was eating overnight oats at time of visit around 11AM. She denies any food or drinks overnight, though all the extra yogurts (4) that were at bedside yesterday are gone.    CAPILLARY BLOOD GLUCOSE & INSULIN RECEIVED  225 mg/dL (08-02 @ 13:41) - Lispro 25+4  422 mg/dL (08-02 @ 16:53) - Lispro 25+12  368 mg/dL (08-02 @ 19:37)  335 mg/dL (08-02 @ 21:48) - Lantus 50 + lispro 8  317 mg/dL (08-03 @ 05:30)  286 mg/dL (08-03 @ 08:38) - Lantus 50 + lispro 25 + 6      REVIEW OF SYSTEMS  Constitutional:  Negative fever, chills or loss of appetite.  Eyes:  Negative blurry vision or double vision.  Cardiovascular:  Negative for chest pain or palpitations.  Respiratory:  Negative for cough, wheezing, or shortness of breath.    Gastrointestinal:  Negative for nausea, vomiting, diarrhea, constipation, or abdominal pain.  Genitourinary:  Negative frequency, urgency or dysuria.  Neurologic:  No headache, confusion, dizziness, lightheadedness.    PHYSICAL EXAM  Vital Signs Last 24 Hrs  T(C): 36.9 (02 Aug 2023 17:34), Max: 36.9 (02 Aug 2023 17:34)  T(F): 98.4 (02 Aug 2023 17:34), Max: 98.4 (02 Aug 2023 17:34)  HR: 90 (03 Aug 2023 09:00) (84 - 100)  BP: 145/65 (03 Aug 2023 09:00) (118/61 - 145/65)  BP(mean): 87 (03 Aug 2023 05:28) (85 - 87)  RR: 18 (03 Aug 2023 09:00) (16 - 19)  SpO2: 97% (03 Aug 2023 09:00) (95% - 99%)    Parameters below as of 03 Aug 2023 09:00  Patient On (Oxygen Delivery Method): room air    Constitutional: Awake, alert, in no acute distress.   HEENT: Normocephalic, atraumatic, NAOMI, no proptosis or lid retraction.   Neck: supple, no acanthosis, no thyromegaly or palpable thyroid nodules.  Respiratory: Lungs clear to ausculation bilaterally.   Cardiovascular: regular rhythm, normal S1 and S2, no audible murmurs.   GI: soft, non-tender, non-distended, bowel sounds present, no masses appreciated. + ostomy  Extremities: No lower extremity edema, peripheral pulses present.   Skin: no rashes.   Psychiatric: AAO x 3.     LABS  CBC - WBC/HGB/HTC/PLT: 9.37/9.9/32.8/366 (08-03-23)  BMP - Na/K/Cl/Bicarb/BUN/Cr/Gluc/AG/eGFR: 138/4.0/104/22/18/0.73/317/12/105 (08-03-23)  Ca - 8.0 (08-03-23)  Phos - -- (08-03-23)  Mg - 1.5 (08-03-23)  LFT - Alb/Tprot/Tbili/Dbili/AlkPhos/ALT/AST: 2.8/--/<0.2/--/148/9/11 (08-01-23)  PT/aPTT/INR: 11.1/31.3/0.97 (08-01-23)   Thyroid Stimulating Hormone, Serum: 0.305 (06-21-23)      MEDICATIONS  MEDICATIONS  (STANDING):  albuterol/ipratropium for Nebulization 3 milliLiter(s) Nebulizer every 6 hours  atorvastatin 80 milliGRAM(s) Oral at bedtime  clopidogrel Tablet 75 milliGRAM(s) Oral daily  clotrimazole 2% Vaginal Cream 1 Applicatorful Vaginal daily  dextrose 5%. 1000 milliLiter(s) (100 mL/Hr) IV Continuous <Continuous>  dextrose 5%. 1000 milliLiter(s) (50 mL/Hr) IV Continuous <Continuous>  dextrose 50% Injectable 25 Gram(s) IV Push once  dextrose 50% Injectable 12.5 Gram(s) IV Push once  dextrose 50% Injectable 25 Gram(s) IV Push once  gabapentin 600 milliGRAM(s) Oral every 8 hours  glucagon  Injectable 1 milliGRAM(s) IntraMuscular once  insulin glargine Injectable (LANTUS) 50 Unit(s) SubCutaneous every morning  insulin glargine Injectable (LANTUS) 50 Unit(s) SubCutaneous at bedtime  insulin lispro (ADMELOG) corrective regimen sliding scale   SubCutaneous Before meals and at bedtime  insulin lispro Injectable (ADMELOG) 25 Unit(s) SubCutaneous three times a day before meals  linezolid    Tablet 600 milliGRAM(s) Oral every 12 hours  metoprolol succinate ER 50 milliGRAM(s) Oral every 24 hours  pantoprazole    Tablet 40 milliGRAM(s) Oral before breakfast  sodium chloride 0.9%. 500 milliLiter(s) (75 mL/Hr) IV Continuous <Continuous>    MEDICATIONS  (PRN):  acetaminophen     Tablet .. 650 milliGRAM(s) Oral every 6 hours PRN Temp greater or equal to 38C (100.4F), Mild Pain (1 - 3)  dextrose Oral Gel 15 Gram(s) Oral once PRN Blood Glucose LESS THAN 70 milliGRAM(s)/deciliter  diphenhydrAMINE Injectable 50 milliGRAM(s) IV Push once PRN Allergy symptoms  oxyCODONE    IR 5 milliGRAM(s) Oral every 6 hours PRN Severe Pain (7 - 10)  oxyCODONE    IR 10 milliGRAM(s) Oral every 12 hours PRN breakthrough pain

## 2023-08-03 NOTE — PROGRESS NOTE ADULT - ATTENDING COMMENTS
42 y/o female with PMHx of Obesity ( BMI 34.9), HTN,HLD, uncontrolled DM2 (A1C was 15% now 11%,  on insulin); CAD, Asthma, CVA (11/2021; residual L>R weakness), PE (4/2023 on Eliquis), hx abdominal necrotizing fasciitis ( ostomy placement in 11/2021 with intubation from 11-12/2021), hx of frequent UTIs 2/2 to suprapubic catheter, most recent UTI c/b Urosepsis and discharged with midline for Ertapenem, recent cardiac stents now presenting due to general malaise with c/f recurrent UTI, and CP admitted for management of L labia cellulitis. Now s/p staged PCI/ NAT 8/1.       #Purulent L labia Cellulitis ( MSSA and VRE)   # Candida Vaginosis  - s/p I&D by surgery ( 7/30)  - s/p Ertapenem 1g in the ED, transitioned to Zosyn from 7/28-7/29. S/p Daptomycin 600mg Q24H and Ertapenem 1g q24h from 7/29-7/30. s/p vaginal discharge/itching  - received Fluconazole 200mg PO x 1  - BCx: NGTD  - c/w Linezolid 600mg PO Q12H (7/31-8/5)    # Pain   - c/w Oxycodone 10mg po q12hr for breakthrough pain and Oxycodone 5mg q6hr for severe pain 7-10/10  - Tylenol q6h PRN Q6H for fever  - home OT     #CAD   - CCTA (06/23/2023) revealing Ca score 129 (accuracy of score is limited by image noise), probable severe pRCA stenosis, mod D2 stenosis, non obstructive in remaining coronaries.   -  s/p cardiac angiogram (6/26/23) and is s/p IVUS guided NAT pRCA (85%), NAT RPLS (80%); residual dLCx/OM1 70%, mLAD 50%.   - s/p staged PCI of LCx (8/1/23)   - PCI to OM1 only if symptomatic. Pt having chest pain, post procedure   - c/w Atorvastatin to 80 at bedtime    # uncontrolled DM  # hyperglycemia   - Endocrine f/u appreciated   - Lantus 50u in AM and 50u at bedtime. Also on Lispro 25u before meals  - FS/NISS  - appreciate endo recs      #History of pulmonary embolism:   - resume Eliquis 24hr after procedure (then may resume)  - PT consult    Med consult team continues to follow, thank you.
Patient is a 42 yo F w/ contrast allergy PMH CAD s/p PCI, pending Staged LCx/Om intervention, HTN, HLD, poorly controlled DM-II, CVA (11/2021, residual LE weakness L>R), PE 4/2023 (on Eliquis), Asthma, abd necrotizing fasciitis s/p suprapubic catheter and ostomy and frequent UTIs 2/2 suprapubic catheter w/ recent hospitalization for Urosepsis  readmitted for management of cellulitis with concern for left groin abscess, s/p ID with clinical improvement. Cardiology consulted for Stable Angina    Review of Studies:  - ECG 7/30/23: NSR @82 bpm (unchanged from prior admission)  - TTE 6/21/23: Mild symmetric LVH. LVIDd 3.2cm. Hyperdynamic LVEF. Normal RV size and systolic function. Normal atria. No significant valvular disease. No pericardial effusion.   - CCTA 6/23: Ca score 129 (accuracy of score is limited by image noise), probable severe pRCA stenosis, mod D2 stenosis, non obstructive in remaining coronaries.   - [facility] 06/26/23 R [place]: Normal LM, 40-50% mLAD, D1 mild disease,  70% dLCX/OM1 stenosis, 85% pRCA and 80% RPLS. Intervention: IVUS guided NAT pRCA (85%) and NAT RPLS (80%); dLCx/OM1 70%, mLAD 50%. LVEDP 17.     # Stable Angina  # CVA  # DVT/PE      #Stable Angina in patient with Known CAD  - Patient with chest discomfort with exertion, reportedly improved since last PCI however with persistent discomfort  - Reports compliance with Eliquis and Plavix.   - ECG showing NSR without ischemic changes and unchanged from prior  -  [facility] from 06/26 with 70% dLCx/OM1 originally due for staged intervention in 1 week  - At this time given cellulitis/UTI stable, clinically improved on PO regimen, would pursue [facility]  - Please load patient with 325 mg PO ASA x1 on 7/31 with plan to continue ASA 81 mg po daily STARTING 08/01  - Continue plavix 75 mg po daily  - Please DC Eliquis. Last dose 7/30 in PM. Will likely resume post cath  - Would increase  Lipitor to  80mg QD  - Continue Toprol 50mg QD (hold HR <60, SBP <100). Can uptitrate HR permitting for maximal anti anginal effects  - Pt to be consented for staged PCI with Dr Carver for 08/01/23  - Please keep NPO after midnight    # CVA  - Cont Plavix 75 mg daily.  - Increase Lipitor to 80 mg daily    # DVT/PE  - Cont to Hold Eliquis pre Cath  - Plan to resume post PCI
Patient is 42 yo woman with IDDM, CVA, SPC, CAD/stents, abdominal wall necrotizing fasciitis, PEs on AC, admitted with purulent collection in her left groin. Patient was afebrile and did not have significant leukocytosis. On PE pt is noted to have 3 by 3cm erythematous area in left groin with central opening and pustular discharged expressed.   On labs pt is persistently hyperglycemic.   ----for groin abscess, s/p I&D, Cx positive for MSSA and E. Faecium. Now transitioned to PO Linezolid as per ID input. Will clarify the duration of antibiotic course with ID team  ----for hyperglycemia, pt is on long and short acting insulins. FS range in 200s  ----planned for PCI today wit NAT to be placed in 2 places. On ASA and Plavix. Likely to be transferred to telemetry service
Patient is 44 yo woman with IDDM, CVA, SPC, CAD/stents, abdominal wall necrotizing fasciitis, PEs on AC, admitted with purulent collection in her left groin. Patient was afebrile and did not have significant leukocytosis. On PE pt is noted to have 3 by 3cm erythematous area in left groin with central opening and pustular discharged expressed.   On labs pt is persistently hyperglycemic.   ----for groin abscess, s/p I&D, Cx positive for MSSA and E. Faecium. Now transitioned to PO Linezolid as per ID input  ----for hyperglycemia, pt is on long and short acting insulins. FS range in 200s  ----patient experienced chest pain over the weekend and given known obstructive CAD is planned for NAT placements tomorrow. Will hold AC, load pt with ASA and cont Plavix for the procedure. Discharge anticoagulation and antiplatelet therapy will discuss with Cardiology service. Patient will likely will be transferred to telemetry unit post procedure.

## 2023-08-03 NOTE — PROGRESS NOTE ADULT - ASSESSMENT
42 y/o female with PMHx Asthma, CVA (11/2021; residual L>R weakness), PE (4/2023 on Eliquis), uncontrolled DM-2 (on insulin), HTN, HLD, hx abdominal necrotizing fasciitis ( ostomy placement in 11/2021 with intubation from 11-12/2021), hx of frequent UTIs 2/2 to suprapubic catheter, most recent UTI c/b Urosepsis and discharged with midline for Ertapenem, recent cardiac stents now presenting due to general malaise with c/f recurrent UTI, and CP admitted for management of cellulitis. Now s/p additional NAT 8/1.       #Purulent Cellulitis: Pt reports new pimple on L labia present for several days, now progressed to swelling, erythema, tenderness extending from the L labia to inner thigh. Initially noted purulent and bloody drainage. On physical exam, bloody and purulent drainage noted from site, very tender to palpation and erythematous. s/p Ertapenem 1g in the ED, transitioned to Zosyn from 7/28-7/29. S/p Daptomycin 600mg Q24H and Ertapenem 1g q24h from 7/29-7/30. Wound Cx: Staphylococcus aureus. s/p I&D of the abscess by surgery on 7/30, s/p Dilaudid in pm for pain 0.5mg x1. s/p vaginal discharge/itching, received Fluconazole 200mg PO x 1  - BCx: NGTD  - c/w Linezolid 600mg PO Q12H (7/31-8/5)  - c/w Oxycodone 10mg for breakthrough pain and Oxycodone 5mg for severe pain  - Tylenol q6h PRN Q6H for fever  - PT consult    #Asymptomatic bacteriuria: pt reports ongoing hx of dysuria, which worsened the past few days. Pt has a history of recurrent UTIs, which was attributed to suprapubic catheter. Catheter was removed during previous hospitalization in June. Initial UA negative of WBCs, leuk esterase and nitrites. Urine Cx: Staph aureus and enterococcus faecalis with sensitivities. Received Zosyn and Ertapenem for this symptoms. Asymptomatic at this time  - No abx at this time   - Monitor for symptoms  - Monitor vitals and daily CBC.    #Colostomy hernia: Pt with Hx of abdominal necrotizing fascitis s/p ostomy placement. Pt with pain around the ostomy, on exam there is a hernia palpated at the ostomy site, tender to touch. CT Abdomen from 6/21 showing Fat-containing parastomal hernia, unchanged. Surgery following. Producing formed stool  - Surgery states no intervention at this time as it is easily reducible  - Monitor ostomy output.    #CAD (coronary artery disease): Pt was admitted to cardiac service during last admission. Pt underwent CCTA on 06/23/2023 revealing Ca score 129 (accuracy of score is limited by image noise), probable severe pRCA stenosis, mod D2 stenosis, non obstructive in remaining coronaries. Pt is s/p cardiac angiogram on 6/26 and is s/p IVUS guided NAT pRCA (85%), NAT RPLS (80%); residual dLCx/OM1 70%, mLAD 50%. Pt was instructed to return for staged PCI of LCx and OM1 only if symptomatic. Pt having chest pain, post procedure   - c/w Atorvastatin to 80 at bedtime  - remainder of plan per cardiology primary team    #Stable angina: Pt recently admitted to cardiology 06/23/23 underwent cardiac cath - 6/26/23: IVUS guided NAT pRCA (85%), NAT RPLS (80%); residual dLCx/OM1 70%, mLAD 50%. Pt was instructed to return for staged PCI of LCx and OM1 only if symptomatic. Pt with chest discomfort on exertion. ECG with NSR and no signs of ischemic changes. Trops negative. PCI with Dr Carver on 8/1w/ NAT x 1 mLCx/OM1 (70%), RCA: patent stents, LM: short segment, MLI, mLAD: 40%, EDP: 29 mmHg  - Continue medications per cardiology recs     #Diabetes mellitus: Per previous hospitalization, pt was on Lantus 50u in AM and 50u at bedtime. Also on Lispro 25u before meals. Glucose >300 on arrival. Glucose at 500s now, on ISS, likely d/t pre-medication with steroid i/s/o contrast allergy and active infections.   - appreciate endo recs      #History of pulmonary embolism: History of bilateral pulmonary emboli in segmental and subsegmental branches on the R and subsegmental branches on the L, diagnosed on CT chest during ED visit on 4/7/23. Home meds: Eliquis 5mg BID. No respiratory distress at this time   - Hold Eliquis until 48h after procedure (then may resume)  - PT consult    Medicine will continue to follow.  Note not finalized until attending attesting.    44 y/o female with PMHx Asthma, CVA (11/2021; residual L>R weakness), PE (4/2023 on Eliquis), uncontrolled DM-2 (on insulin), HTN, HLD, hx abdominal necrotizing fasciitis ( ostomy placement in 11/2021 with intubation from 11-12/2021), hx of frequent UTIs 2/2 to suprapubic catheter, most recent UTI c/b Urosepsis and discharged with midline for Ertapenem, recent cardiac stents now presenting due to general malaise with c/f recurrent UTI, and CP admitted for management of cellulitis. Now s/p additional NAT 8/1.       #Purulent Cellulitis: Pt reports new pimple on L labia present for several days, now progressed to swelling, erythema, tenderness extending from the L labia to inner thigh. Initially noted purulent and bloody drainage. On physical exam, bloody and purulent drainage noted from site, very tender to palpation and erythematous. s/p Ertapenem 1g in the ED, transitioned to Zosyn from 7/28-7/29. S/p Daptomycin 600mg Q24H and Ertapenem 1g q24h from 7/29-7/30. Wound Cx: Staphylococcus aureus. s/p I&D of the abscess by surgery on 7/30, s/p Dilaudid in pm for pain 0.5mg x1. s/p vaginal discharge/itching, received Fluconazole 200mg PO x 1  - BCx: NGTD  - c/w Linezolid 600mg PO Q12H (7/31-8/5)  - c/w Oxycodone 10mg for breakthrough pain and Oxycodone 5mg for severe pain  - Tylenol q6h PRN Q6H for fever  - home OT     #Asymptomatic bacteriuria: pt reports ongoing hx of dysuria, which worsened the past few days. Pt has a history of recurrent UTIs, which was attributed to suprapubic catheter. Catheter was removed during previous hospitalization in June. Initial UA negative of WBCs, leuk esterase and nitrites. Urine Cx: Staph aureus and enterococcus faecalis with sensitivities. Received Zosyn and Ertapenem for this symptoms. Asymptomatic at this time  - No abx at this time   - Monitor for symptoms  - Monitor vitals and daily CBC.    #Colostomy hernia: Pt with Hx of abdominal necrotizing fascitis s/p ostomy placement. Pt with pain around the ostomy, on exam there is a hernia palpated at the ostomy site, tender to touch. CT Abdomen from 6/21 showing Fat-containing parastomal hernia, unchanged. Surgery following. Producing formed stool  - Surgery states no intervention at this time as it is easily reducible  - Monitor ostomy output.    #CAD (coronary artery disease): Pt was admitted to cardiac service during last admission. Pt underwent CCTA on 06/23/2023 revealing Ca score 129 (accuracy of score is limited by image noise), probable severe pRCA stenosis, mod D2 stenosis, non obstructive in remaining coronaries. Pt is s/p cardiac angiogram on 6/26 and is s/p IVUS guided NAT pRCA (85%), NAT RPLS (80%); residual dLCx/OM1 70%, mLAD 50%. Pt was instructed to return for staged PCI of LCx and OM1 only if symptomatic. Pt having chest pain, post procedure   - c/w Atorvastatin to 80 at bedtime  - remainder of plan per cardiology primary team    #Stable angina: Pt recently admitted to cardiology 06/23/23 underwent cardiac cath - 6/26/23: IVUS guided NAT pRCA (85%), NAT RPLS (80%); residual dLCx/OM1 70%, mLAD 50%. Pt was instructed to return for staged PCI of LCx and OM1 only if symptomatic. Pt with chest discomfort on exertion. ECG with NSR and no signs of ischemic changes. Trops negative. PCI with Dr Carver on 8/1w/ NAT x 1 mLCx/OM1 (70%), RCA: patent stents, LM: short segment, MLI, mLAD: 40%, EDP: 29 mmHg  - Continue medications per cardiology recs     #Diabetes mellitus: Per previous hospitalization, pt was on Lantus 50u in AM and 50u at bedtime. Also on Lispro 25u before meals. Glucose >300 on arrival. Glucose at 500s now, on ISS, likely d/t pre-medication with steroid i/s/o contrast allergy and active infections.   - appreciate endo recs      #History of pulmonary embolism: History of bilateral pulmonary emboli in segmental and subsegmental branches on the R and subsegmental branches on the L, diagnosed on CT chest during ED visit on 4/7/23. Home meds: Eliquis 5mg BID. No respiratory distress at this time   - Hold Eliquis until 48h after procedure (then may resume)  - PT consult    Medicine will continue to follow.  Note not finalized until attending attesting.    44 y/o female with PMHx Asthma, CVA (11/2021; residual L>R weakness), PE (4/2023 on Eliquis), uncontrolled DM-2 (on insulin), HTN, HLD, hx abdominal necrotizing fasciitis ( ostomy placement in 11/2021 with intubation from 11-12/2021), hx of frequent UTIs 2/2 to suprapubic catheter, most recent UTI c/b Urosepsis and discharged with midline for Ertapenem, recent cardiac stents now presenting due to general malaise with c/f recurrent UTI, and CP admitted for management of cellulitis. Now s/p additional NAT 8/1.       #Purulent Cellulitis: Pt reports new pimple on L labia present for several days, now progressed to swelling, erythema, tenderness extending from the L labia to inner thigh. Initially noted purulent and bloody drainage. On physical exam, bloody and purulent drainage noted from site, very tender to palpation and erythematous. s/p Ertapenem 1g in the ED, transitioned to Zosyn from 7/28-7/29. S/p Daptomycin 600mg Q24H and Ertapenem 1g q24h from 7/29-7/30. Wound Cx: Staphylococcus aureus. s/p I&D of the abscess by surgery on 7/30, s/p Dilaudid in pm for pain 0.5mg x1. s/p vaginal discharge/itching, received Fluconazole 200mg PO x 1  - BCx: NGTD  - c/w Linezolid 600mg PO Q12H (7/31-8/5)  - c/w Oxycodone 10mg for breakthrough pain and Oxycodone 5mg for severe pain  - Tylenol q6h PRN Q6H for fever  - home OT     #Asymptomatic bacteriuria: pt reports ongoing hx of dysuria, which worsened the past few days. Pt has a history of recurrent UTIs, which was attributed to suprapubic catheter. Catheter was removed during previous hospitalization in June. Initial UA negative of WBCs, leuk esterase and nitrites. Urine Cx: Staph aureus and enterococcus faecalis with sensitivities. Received Zosyn and Ertapenem for this symptoms. Asymptomatic at this time  - No abx at this time   - Monitor for symptoms  - Monitor vitals and daily CBC.    #Colostomy hernia: Pt with Hx of abdominal necrotizing fascitis s/p ostomy placement. Pt with pain around the ostomy, on exam there is a hernia palpated at the ostomy site, tender to touch. CT Abdomen from 6/21 showing Fat-containing parastomal hernia, unchanged. Surgery following. Producing formed stool  - Surgery states no intervention at this time as it is easily reducible  - Monitor ostomy output.    #CAD (coronary artery disease): Pt was admitted to cardiac service during last admission. Pt underwent CCTA on 06/23/2023 revealing Ca score 129 (accuracy of score is limited by image noise), probable severe pRCA stenosis, mod D2 stenosis, non obstructive in remaining coronaries. Pt is s/p cardiac angiogram on 6/26 and is s/p IVUS guided NAT pRCA (85%), NAT RPLS (80%); residual dLCx/OM1 70%, mLAD 50%. Pt was instructed to return for staged PCI of LCx and OM1 only if symptomatic. Pt having chest pain, post procedure   - c/w Atorvastatin to 80 at bedtime  - remainder of plan per cardiology primary team    #Stable angina: Pt recently admitted to cardiology 06/23/23 underwent cardiac cath - 6/26/23: IVUS guided NAT pRCA (85%), NAT RPLS (80%); residual dLCx/OM1 70%, mLAD 50%. Pt was instructed to return for staged PCI of LCx and OM1 only if symptomatic. Pt with chest discomfort on exertion. ECG with NSR and no signs of ischemic changes. Trops negative. PCI with Dr Carver on 8/1w/ NAT x 1 mLCx/OM1 (70%), RCA: patent stents, LM: short segment, MLI, mLAD: 40%, EDP: 29 mmHg  - Continue medications per cardiology recs     #Diabetes mellitus: Per previous hospitalization, pt was on Lantus 50u in AM and 50u at bedtime. Also on Lispro 25u before meals. Glucose >300 on arrival. Glucose at 500s now, on ISS, likely d/t pre-medication with steroid i/s/o contrast allergy and active infections.   - appreciate endo recs      #History of pulmonary embolism: History of bilateral pulmonary emboli in segmental and subsegmental branches on the R and subsegmental branches on the L, diagnosed on CT chest during ED visit on 4/7/23. Home meds: Eliquis 5mg BID. No respiratory distress at this time   - Restart eliquis (>24h post procedure)   - PT consult    Medicine will continue to follow.  Note not finalized until attending attesting.

## 2023-08-03 NOTE — DISCHARGE NOTE NURSING/CASE MANAGEMENT/SOCIAL WORK - NSDCPEFALRISK_GEN_ALL_CORE
For information on Fall & Injury Prevention, visit: [email or web address] OR  [email or web address] OR  https://www.cdc.gov/steadi/patient.html

## 2023-08-03 NOTE — DISCHARGE NOTE NURSING/CASE MANAGEMENT/SOCIAL WORK - PATIENT PORTAL LINK FT
You can access the FollowMyHealth Patient Portal offered by [facility] by registering at the following website: [email or web address]. By joining [employer]’s FollowMyHealth portal, you will also be able to view your health information using other applications (apps) compatible with our system.

## 2023-08-03 NOTE — PROGRESS NOTE ADULT - PROVIDER SPECIALTY LIST ADULT
Cardiology
Endocrinology
Infectious Disease
Infectious Disease
Intervent Cardiology
Surgery
Surgery
Cardiology
Infectious Disease
Internal Medicine
Surgery
Cardiology
Hospitalist
Internal Medicine

## 2023-08-03 NOTE — DISCHARGE NOTE NURSING/CASE MANAGEMENT/SOCIAL WORK - NSDCVIVACCINE_GEN_ALL_CORE_FT
Tdap; 25-Apr-2019 12:41; Neena Chavez (ARMANDO); [employer]; [id number] (Exp. Date: 02-Nov-2020); IntraMuscular; Deltoid Right.; 0.5 milliLiter(s); VIS (VIS Published: 09-May-2013, VIS Presented: 25-Apr-2019);

## 2023-08-03 NOTE — PROGRESS NOTE ADULT - ASSESSMENT
44 y/o female with PMHx Asthma, CVA (11/2021; residual L>R weakness), PE (4/2023 on Eliquis), uncontrolled DM-2 (on insulin), HTN, HLD, hx abdominal necrotizing fasciitis ( ostomy placement in 11/2021 with intubation from 11-12/2021), hx of frequent UTIs 2/2 to suprapubic catheter, most recent UTI c/b Urosepsis and discharged with midline for Ertapenem, recent cardiac stents now presenting due to general malaise with c/f recurrent UTI, and CP admitted for management of cellulitis, and now s/p additional NAT 8/1.     Endocrine consulted for uncontrolled type 2 diabetes with steroid induced hyperglycemia.  Hyperglycemia is multifactorial: steroids, infection, and diet.   Now that she is voiding freely with episodes of incontinence, elevated blood glucose likely contributing to current infection.  Would recommend GLP-1 as OP if appropriate. With current nausea it is not appropriate to start.    A1C: 11.9 %  BUN: 18  Creatinine: 0.73  GFR: 105  Weight: 101.2  BMI: 34.9 42 y/o female with PMHx Asthma, CVA (11/2021; residual L>R weakness), PE (4/2023 on Eliquis), uncontrolled DM-2 (on insulin), HTN, HLD, hx abdominal necrotizing fasciitis ( ostomy placement in 11/2021 with intubation from 11-12/2021), hx of frequent UTIs 2/2 to suprapubic catheter, most recent UTI c/b Urosepsis and discharged with midline for Ertapenem, recent cardiac stents now presenting due to general malaise with c/f recurrent UTI, and CP admitted for management of cellulitis, and now s/p additional NAT 8/1.     Endocrine consulted for uncontrolled type 2 diabetes with steroid induced hyperglycemia.  Hyperglycemia is multifactorial: steroids, infection, and diet. Difficult to adjust insulin with inconsistent diet and timing of meals.  Now that she is voiding freely with episodes of incontinence, elevated blood glucose likely contributing to current infection.  Would recommend GLP-1 as OP if appropriate. With current nausea it is not appropriate to start.    A1C: 11.9 %  BUN: 18  Creatinine: 0.73  GFR: 105  Weight: 101.2  BMI: 34.9

## 2023-08-03 NOTE — DISCHARGE NOTE NURSING/CASE MANAGEMENT/SOCIAL WORK - NSDCFUADDAPPT_GEN_ALL_CORE_FT
You can follow up at discharge with Dr [facility] by calling [phone] to make an appointment.

## 2023-08-07 NOTE — PROGRESS NOTE ADULT - PROBLEM/PLAN-5
DISPLAY PLAN FREE TEXT
Terbinafine Counseling: Patient counseling regarding adverse effects of terbinafine including but not limited to headache, diarrhea, rash, upset stomach, liver function test abnormalities, itching, taste/smell disturbance, nausea, abdominal pain, and flatulence.  There is a rare possibility of liver failure that can occur when taking terbinafine.  The patient understands that a baseline LFT and kidney function test may be required. The patient verbalized understanding of the proper use and possible adverse effects of terbinafine.  All of the patient's questions and concerns were addressed.

## 2023-08-08 ENCOUNTER — APPOINTMENT (OUTPATIENT)
Dept: HEART AND VASCULAR | Facility: CLINIC | Age: 43
End: 2023-08-08

## 2023-08-10 NOTE — PROGRESS NOTE ADULT - SUBJECTIVE AND OBJECTIVE BOX
SUBJECTIVE/OVERNIGHT EVENTS: No acute overnight events. Pt seen in AM at bedside, resting comfortably in bed, and does not appear to be in any acute distress. When asked, pt denies any recent or active fever, chills, nausea, vomiting, headache, acute sob, chest pain, abdominal pain, genitourinary sx, extremity pain or swelling.    VITAL SIGNS:  Vital Signs Last 24 Hrs  T(C): 36.1 (24 Jun 2023 05:46), Max: 36.5 (23 Jun 2023 13:57)  T(F): 96.9 (24 Jun 2023 05:46), Max: 97.7 (23 Jun 2023 13:57)  HR: 86 (24 Jun 2023 05:38) (74 - 94)  BP: 126/58 (24 Jun 2023 05:38) (115/72 - 150/61)  BP(mean): 83 (24 Jun 2023 05:38) (83 - 89)  RR: 18 (24 Jun 2023 05:38) (18 - 18)  SpO2: 99% (24 Jun 2023 05:38) (99% - 100%)    Parameters below as of 24 Jun 2023 05:38  Patient On (Oxygen Delivery Method): room air        PHYSICAL EXAM:  General: NAD, resting in bed  HEENT: MMM  Neck: supple  Cardiovascular: +S1/S2; RRR  Respiratory: CTA B/L; no W/R/R  Gastrointestinal: soft, ND, +BS, ostomy bag in place, clean well-appearing surrounding skin, no output in bag, clean dry umbilicus, tenderness to palpation but out of proportion with exam and patient appears comfortable despite exclamation.   Back: tenderness to palpation of R paraspinal region of midback  Extremities: WWP; no edema, clubbing or cyanosis; diffusely tender/sensitive to palpation  Vascular: 2+ radial, DP/PT pulses B/L  Neuro: AOx3    MEDICATIONS:  MEDICATIONS  (STANDING):  acetaminophen     Tablet .. 650 milliGRAM(s) Oral every 6 hours  aspirin enteric coated 81 milliGRAM(s) Oral daily  atorvastatin 20 milliGRAM(s) Oral at bedtime  dextrose 5%. 1000 milliLiter(s) (100 mL/Hr) IV Continuous <Continuous>  dextrose 5%. 1000 milliLiter(s) (50 mL/Hr) IV Continuous <Continuous>  dextrose 50% Injectable 12.5 Gram(s) IV Push once  dextrose 50% Injectable 25 Gram(s) IV Push once  dextrose 50% Injectable 25 Gram(s) IV Push once  gabapentin 300 milliGRAM(s) Oral three times a day  glucagon  Injectable 1 milliGRAM(s) IntraMuscular once  heparin  Infusion.  Unit(s)/Hr (18 mL/Hr) IV Continuous <Continuous>  insulin glargine Injectable (LANTUS) 40 Unit(s) SubCutaneous at bedtime  insulin glargine Injectable (LANTUS) 30 Unit(s) SubCutaneous every morning  insulin lispro (ADMELOG) corrective regimen sliding scale   SubCutaneous Before meals and at bedtime  insulin lispro Injectable (ADMELOG) 20 Unit(s) SubCutaneous three times a day before meals  lactobacillus acidophilus 1 Tablet(s) Oral daily  loratadine 10 milliGRAM(s) Oral daily  metoprolol succinate ER 50 milliGRAM(s) Oral at bedtime  oxybutynin 5 milliGRAM(s) Oral three times a day  sodium chloride 0.9%. 1000 milliLiter(s) (150 mL/Hr) IV Continuous <Continuous>    MEDICATIONS  (PRN):  albuterol    90 MICROgram(s) HFA Inhaler 2 Puff(s) Inhalation every 6 hours PRN Shortness of Breath and/or Wheezing  dextrose Oral Gel 15 Gram(s) Oral once PRN Blood Glucose LESS THAN 70 milliGRAM(s)/deciliter  oxyCODONE    IR 5 milliGRAM(s) Oral every 8 hours PRN Moderate Pain (4 - 6)      ALLERGIES:  Allergies    shellfish. (Hives; Anaphylaxis)  iodine containing compounds (Hives; Anaphylaxis)  fish (Hives; Urticaria)  clindamycin (Pruritus)  vancomycin (Anaphylaxis; Hives)  amoxicillin (Short breath; Rash)  penicillin (Hives)    Intolerances    Bactrim I.V. (Rash (Mod to Severe))      LABS:                        10.3   9.66  )-----------( 344      ( 24 Jun 2023 07:34 )             33.5     06-23    131<L>  |  101  |  21  ----------------------------<  446<H>  4.4   |  17<L>  |  1.16    Ca    7.3<L>      23 Jun 2023 05:30  Mg     1.4     06-23      PTT - ( 23 Jun 2023 05:30 )  PTT:78.2 sec    RADIOLOGY & ADDITIONAL TESTS: Reviewed. Attending with

## 2023-08-18 DIAGNOSIS — Z79.4 LONG TERM (CURRENT) USE OF INSULIN: ICD-10-CM

## 2023-08-18 DIAGNOSIS — Z93.3 COLOSTOMY STATUS: ICD-10-CM

## 2023-08-18 DIAGNOSIS — R32 UNSPECIFIED URINARY INCONTINENCE: ICD-10-CM

## 2023-08-18 DIAGNOSIS — B95.7 OTHER STAPHYLOCOCCUS AS THE CAUSE OF DISEASES CLASSIFIED ELSEWHERE: ICD-10-CM

## 2023-08-18 DIAGNOSIS — E83.42 HYPOMAGNESEMIA: ICD-10-CM

## 2023-08-18 DIAGNOSIS — K43.5 PARASTOMAL HERNIA WITHOUT OBSTRUCTION OR GANGRENE: ICD-10-CM

## 2023-08-18 DIAGNOSIS — I25.118 ATHEROSCLEROTIC HEART DISEASE OF NATIVE CORONARY ARTERY WITH OTHER FORMS OF ANGINA PECTORIS: ICD-10-CM

## 2023-08-18 DIAGNOSIS — Z88.1 ALLERGY STATUS TO OTHER ANTIBIOTIC AGENTS STATUS: ICD-10-CM

## 2023-08-18 DIAGNOSIS — Z79.02 LONG TERM (CURRENT) USE OF ANTITHROMBOTICS/ANTIPLATELETS: ICD-10-CM

## 2023-08-18 DIAGNOSIS — Z86.711 PERSONAL HISTORY OF PULMONARY EMBOLISM: ICD-10-CM

## 2023-08-18 DIAGNOSIS — Z86.718 PERSONAL HISTORY OF OTHER VENOUS THROMBOSIS AND EMBOLISM: ICD-10-CM

## 2023-08-18 DIAGNOSIS — N76.4 ABSCESS OF VULVA: ICD-10-CM

## 2023-08-18 DIAGNOSIS — B95.2 ENTEROCOCCUS AS THE CAUSE OF DISEASES CLASSIFIED ELSEWHERE: ICD-10-CM

## 2023-08-18 DIAGNOSIS — J45.909 UNSPECIFIED ASTHMA, UNCOMPLICATED: ICD-10-CM

## 2023-08-18 DIAGNOSIS — E78.00 PURE HYPERCHOLESTEROLEMIA, UNSPECIFIED: ICD-10-CM

## 2023-08-18 DIAGNOSIS — L03.314 CELLULITIS OF GROIN: ICD-10-CM

## 2023-08-18 DIAGNOSIS — Z79.899 OTHER LONG TERM (CURRENT) DRUG THERAPY: ICD-10-CM

## 2023-08-18 DIAGNOSIS — Z95.5 PRESENCE OF CORONARY ANGIOPLASTY IMPLANT AND GRAFT: ICD-10-CM

## 2023-08-18 DIAGNOSIS — R82.71 BACTERIURIA: ICD-10-CM

## 2023-08-18 DIAGNOSIS — E44.0 MODERATE PROTEIN-CALORIE MALNUTRITION: ICD-10-CM

## 2023-08-18 DIAGNOSIS — I10 ESSENTIAL (PRIMARY) HYPERTENSION: ICD-10-CM

## 2023-08-18 DIAGNOSIS — I25.2 OLD MYOCARDIAL INFARCTION: ICD-10-CM

## 2023-08-18 DIAGNOSIS — R19.7 DIARRHEA, UNSPECIFIED: ICD-10-CM

## 2023-08-18 DIAGNOSIS — E11.65 TYPE 2 DIABETES MELLITUS WITH HYPERGLYCEMIA: ICD-10-CM

## 2023-08-18 DIAGNOSIS — Z88.8 ALLERGY STATUS TO OTHER DRUGS, MEDICAMENTS AND BIOLOGICAL SUBSTANCES: ICD-10-CM

## 2023-08-18 DIAGNOSIS — I69.344 MONOPLEGIA OF LOWER LIMB FOLLOWING CEREBRAL INFARCTION AFFECTING LEFT NON-DOMINANT SIDE: ICD-10-CM

## 2023-08-18 DIAGNOSIS — Z91.190 PATIENT'S NONCOMPLIANCE WITH OTHER MEDICAL TREATMENT AND REGIMEN DUE TO FINANCIAL HARDSHIP: ICD-10-CM

## 2023-08-18 DIAGNOSIS — R30.0 DYSURIA: ICD-10-CM

## 2023-08-18 DIAGNOSIS — L02.214 CUTANEOUS ABSCESS OF GROIN: ICD-10-CM

## 2023-08-18 DIAGNOSIS — Z91.013 ALLERGY TO SEAFOOD: ICD-10-CM

## 2023-08-18 DIAGNOSIS — M21.372 FOOT DROP, LEFT FOOT: ICD-10-CM

## 2023-08-18 DIAGNOSIS — E87.6 HYPOKALEMIA: ICD-10-CM

## 2023-08-18 DIAGNOSIS — E66.9 OBESITY, UNSPECIFIED: ICD-10-CM

## 2023-08-18 DIAGNOSIS — B37.31 ACUTE CANDIDIASIS OF VULVA AND VAGINA: ICD-10-CM

## 2023-08-18 DIAGNOSIS — I25.84 CORONARY ATHEROSCLEROSIS DUE TO CALCIFIED CORONARY LESION: ICD-10-CM

## 2023-08-18 DIAGNOSIS — N76.2 ACUTE VULVITIS: ICD-10-CM

## 2023-08-18 DIAGNOSIS — Z88.0 ALLERGY STATUS TO PENICILLIN: ICD-10-CM

## 2023-08-18 DIAGNOSIS — Z79.01 LONG TERM (CURRENT) USE OF ANTICOAGULANTS: ICD-10-CM

## 2023-08-21 NOTE — BEHAVIORAL HEALTH ASSESSMENT NOTE - NS ED BHA MSE SPEECH RATE
Omkar from [employer] [facility] is calling wanting to know the status of the paperwork he faxed over for Kimberly to sign off on.  They received the office notes but insurance is very picky and needs Kimberly's signature on it.  Please advise as to the status.    The number to fax the signed paperwork back to is:  [phone].   Normal

## 2023-09-30 NOTE — PROGRESS NOTE ADULT - PROBLEM SELECTOR PLAN 2
[facility] 2[facility]2 [facility]. UA positive showing small LE, Positive nitrites, >10 WBC. Prior urine cx growing carbapenem-resistant and ESBL Klebsiella. s/p 2L NS, Gentamicin in ED (did not get Vancomycin). Tx with Gentamicin x5d on previous admission. Hx of suprapubic catheter placed in 11/2021 at [facility] as complication of abdominal necrotizing fasciitis, last exchanged at [facility] on 3/24. Current catheter site with some surrounding skin changes and old catheter site now open with visible catheter underneath.     - Plan as above

## 2023-10-08 PROCEDURE — C1769: CPT

## 2023-10-08 PROCEDURE — C1894: CPT

## 2023-10-08 PROCEDURE — 81001 URINALYSIS AUTO W/SCOPE: CPT

## 2023-10-08 PROCEDURE — 83690 ASSAY OF LIPASE: CPT

## 2023-10-08 PROCEDURE — 93005 ELECTROCARDIOGRAM TRACING: CPT

## 2023-10-08 PROCEDURE — 87070 CULTURE OTHR SPECIMN AEROBIC: CPT

## 2023-10-08 PROCEDURE — 97162 PT EVAL MOD COMPLEX 30 MIN: CPT

## 2023-10-08 PROCEDURE — 84484 ASSAY OF TROPONIN QUANT: CPT

## 2023-10-08 PROCEDURE — 71045 X-RAY EXAM CHEST 1 VIEW: CPT

## 2023-10-08 PROCEDURE — 86850 RBC ANTIBODY SCREEN: CPT

## 2023-10-08 PROCEDURE — 87186 SC STD MICRODIL/AGAR DIL: CPT

## 2023-10-08 PROCEDURE — 97166 OT EVAL MOD COMPLEX 45 MIN: CPT

## 2023-10-08 PROCEDURE — 82962 GLUCOSE BLOOD TEST: CPT

## 2023-10-08 PROCEDURE — 84702 CHORIONIC GONADOTROPIN TEST: CPT

## 2023-10-08 PROCEDURE — 82553 CREATINE MB FRACTION: CPT

## 2023-10-08 PROCEDURE — 84295 ASSAY OF SERUM SODIUM: CPT

## 2023-10-08 PROCEDURE — 87075 CULTR BACTERIA EXCEPT BLOOD: CPT

## 2023-10-08 PROCEDURE — 87205 SMEAR GRAM STAIN: CPT

## 2023-10-08 PROCEDURE — C1887: CPT

## 2023-10-08 PROCEDURE — 80053 COMPREHEN METABOLIC PANEL: CPT

## 2023-10-08 PROCEDURE — 80048 BASIC METABOLIC PNL TOTAL CA: CPT

## 2023-10-08 PROCEDURE — C1725: CPT

## 2023-10-08 PROCEDURE — 86870 RBC ANTIBODY IDENTIFICATION: CPT

## 2023-10-08 PROCEDURE — 96365 THER/PROPH/DIAG IV INF INIT: CPT

## 2023-10-08 PROCEDURE — 87040 BLOOD CULTURE FOR BACTERIA: CPT

## 2023-10-08 PROCEDURE — 82010 KETONE BODYS QUAN: CPT

## 2023-10-08 PROCEDURE — 96366 THER/PROPH/DIAG IV INF ADDON: CPT

## 2023-10-08 PROCEDURE — 96368 THER/DIAG CONCURRENT INF: CPT

## 2023-10-08 PROCEDURE — 85025 COMPLETE CBC W/AUTO DIFF WBC: CPT

## 2023-10-08 PROCEDURE — 94640 AIRWAY INHALATION TREATMENT: CPT

## 2023-10-08 PROCEDURE — 83735 ASSAY OF MAGNESIUM: CPT

## 2023-10-08 PROCEDURE — 82803 BLOOD GASES ANY COMBINATION: CPT

## 2023-10-08 PROCEDURE — 85347 COAGULATION TIME ACTIVATED: CPT

## 2023-10-08 PROCEDURE — 36415 COLL VENOUS BLD VENIPUNCTURE: CPT

## 2023-10-08 PROCEDURE — 87181 SC STD AGAR DILUTION PER AGT: CPT

## 2023-10-08 PROCEDURE — 82550 ASSAY OF CK (CPK): CPT

## 2023-10-08 PROCEDURE — 83605 ASSAY OF LACTIC ACID: CPT

## 2023-10-08 PROCEDURE — 96376 TX/PRO/DX INJ SAME DRUG ADON: CPT

## 2023-10-08 PROCEDURE — 86900 BLOOD TYPING SEROLOGIC ABO: CPT

## 2023-10-08 PROCEDURE — C1874: CPT

## 2023-10-08 PROCEDURE — 99285 EMERGENCY DEPT VISIT HI MDM: CPT

## 2023-10-08 PROCEDURE — 85027 COMPLETE CBC AUTOMATED: CPT

## 2023-10-08 PROCEDURE — 82330 ASSAY OF CALCIUM: CPT

## 2023-10-08 PROCEDURE — 86901 BLOOD TYPING SEROLOGIC RH(D): CPT

## 2023-10-08 PROCEDURE — 84100 ASSAY OF PHOSPHORUS: CPT

## 2023-10-08 PROCEDURE — 85610 PROTHROMBIN TIME: CPT

## 2023-10-08 PROCEDURE — 86880 COOMBS TEST DIRECT: CPT

## 2023-10-08 PROCEDURE — 96375 TX/PRO/DX INJ NEW DRUG ADDON: CPT

## 2023-10-08 PROCEDURE — 87086 URINE CULTURE/COLONY COUNT: CPT

## 2023-10-08 PROCEDURE — 85730 THROMBOPLASTIN TIME PARTIAL: CPT

## 2023-10-08 PROCEDURE — 84132 ASSAY OF SERUM POTASSIUM: CPT

## 2023-10-08 PROCEDURE — 96361 HYDRATE IV INFUSION ADD-ON: CPT

## 2023-10-08 PROCEDURE — 81025 URINE PREGNANCY TEST: CPT

## 2023-10-31 NOTE — H&P ADULT - PROBLEM/PLAN-2
Patient requested colonoscopy prep to be sent to [facility] in [place], [place].   Electronically signed by Yuval Daniel MA on 10/31/23 at 4:03 PM EDT
DISPLAY PLAN FREE TEXT

## 2023-11-12 NOTE — ED PROVIDER NOTE - NEURO NEGATIVE STATEMENT, MLM
no loss of consciousness, no gait abnormality, no headache, no sensory deficits, and no weakness. <-- Click to add NO pertinent Family History

## 2023-11-18 NOTE — PATIENT PROFILE ADULT - PHONE #
Anesthesia Post Evaluation    Patient: Tien Dennis    Procedure(s) Performed: Procedure(s) (LRB):  LAPAROTOMY, EXPLORATORY (N/A)    Final Anesthesia Type: general      Patient location during evaluation: PACU  Patient participation: Yes- Able to Participate  Level of consciousness: awake and alert, oriented and awake  Post-procedure vital signs: reviewed and stable  Airway patency: patent    PONV status at discharge: No PONV  Anesthetic complications: no      Cardiovascular status: blood pressure returned to baseline  Respiratory status: unassisted, spontaneous ventilation and room air  Hydration status: euvolemic  Follow-up not needed.          Vitals Value Taken Time   /80 11/18/23 1735   Temp 37.1 °C (98.7 °F) 11/18/23 1735   Pulse 92 11/18/23 1735   Resp 17 11/18/23 1735   SpO2 97 % 11/18/23 1735         Event Time   Out of Recovery 11:22:00         Pain/Rima Score: Pain Rating Prior to Med Admin: 8 (11/18/2023  9:50 AM)  Pain Rating Post Med Admin: 5 (11/18/2023  9:50 AM)  Rima Score: 10 (11/18/2023 11:20 AM)           26-Feb-2023 12:29 [phone]

## 2023-12-12 NOTE — ED PROVIDER NOTE - OBJECTIVE STATEMENT
37 yo female in the ER c/o pain to her surgical incision, c/o malodorous drainage from the incision. Pt states that wound care visiting nurse told her to go to ER for evaluation as soon as possible. Pain became much worse for the past few days. 39 yo female in the ER c/o pain to her surgical incision, c/o malodorous drainage from the incision. Pt states that wound care visiting nurse told her to go to ER for evaluation as soon as possible. Pain became much worse for the past few days as well as this strong odor from the wound is new. Pt denies fever, chills, nausea, vomiting. Yes

## 2023-12-31 ENCOUNTER — INPATIENT (INPATIENT)
Facility: HOSPITAL | Age: 43
LOS: 4 days | Discharge: HOME HEALTH | DRG: 638 | End: 2024-01-05
Attending: STUDENT IN AN ORGANIZED HEALTH CARE EDUCATION/TRAINING PROGRAM | Admitting: INTERNAL MEDICINE
Payer: MEDICAID

## 2023-12-31 VITALS
WEIGHT: 220.02 LBS | OXYGEN SATURATION: 98 % | RESPIRATION RATE: 18 BRPM | DIASTOLIC BLOOD PRESSURE: 91 MMHG | HEART RATE: 130 BPM | TEMPERATURE: 98 F | SYSTOLIC BLOOD PRESSURE: 134 MMHG

## 2023-12-31 DIAGNOSIS — Z98.890 OTHER SPECIFIED POSTPROCEDURAL STATES: Chronic | ICD-10-CM

## 2023-12-31 DIAGNOSIS — Z98.89 OTHER SPECIFIED POSTPROCEDURAL STATES: Chronic | ICD-10-CM

## 2023-12-31 DIAGNOSIS — Z93.9 ARTIFICIAL OPENING STATUS, UNSPECIFIED: Chronic | ICD-10-CM

## 2023-12-31 LAB
ALBUMIN SERPL ELPH-MCNC: 3.5 G/DL — SIGNIFICANT CHANGE UP (ref 3.3–5)
ALBUMIN SERPL ELPH-MCNC: 3.5 G/DL — SIGNIFICANT CHANGE UP (ref 3.3–5)
ALP SERPL-CCNC: 187 U/L — HIGH (ref 40–120)
ALP SERPL-CCNC: 187 U/L — HIGH (ref 40–120)
ALT FLD-CCNC: 15 U/L — SIGNIFICANT CHANGE UP (ref 10–45)
ALT FLD-CCNC: 15 U/L — SIGNIFICANT CHANGE UP (ref 10–45)
ANION GAP SERPL CALC-SCNC: 13 MMOL/L — SIGNIFICANT CHANGE UP (ref 5–17)
ANION GAP SERPL CALC-SCNC: 13 MMOL/L — SIGNIFICANT CHANGE UP (ref 5–17)
ANION GAP SERPL CALC-SCNC: 14 MMOL/L — SIGNIFICANT CHANGE UP (ref 5–17)
ANION GAP SERPL CALC-SCNC: 14 MMOL/L — SIGNIFICANT CHANGE UP (ref 5–17)
ANION GAP SERPL CALC-SCNC: 17 MMOL/L — SIGNIFICANT CHANGE UP (ref 5–17)
ANION GAP SERPL CALC-SCNC: 17 MMOL/L — SIGNIFICANT CHANGE UP (ref 5–17)
ANION GAP SERPL CALC-SCNC: 20 MMOL/L — HIGH (ref 5–17)
ANION GAP SERPL CALC-SCNC: 20 MMOL/L — HIGH (ref 5–17)
ANISOCYTOSIS BLD QL: SIGNIFICANT CHANGE UP
ANISOCYTOSIS BLD QL: SIGNIFICANT CHANGE UP
APPEARANCE UR: CLEAR — SIGNIFICANT CHANGE UP
APPEARANCE UR: CLEAR — SIGNIFICANT CHANGE UP
APTT BLD: 28.8 SEC — SIGNIFICANT CHANGE UP (ref 24.5–35.6)
APTT BLD: 28.8 SEC — SIGNIFICANT CHANGE UP (ref 24.5–35.6)
AST SERPL-CCNC: 18 U/L — SIGNIFICANT CHANGE UP (ref 10–40)
AST SERPL-CCNC: 18 U/L — SIGNIFICANT CHANGE UP (ref 10–40)
B-OH-BUTYR SERPL-SCNC: 0.4 MMOL/L — SIGNIFICANT CHANGE UP
B-OH-BUTYR SERPL-SCNC: 0.4 MMOL/L — SIGNIFICANT CHANGE UP
BACTERIA # UR AUTO: ABNORMAL /HPF
BACTERIA # UR AUTO: ABNORMAL /HPF
BASE EXCESS BLDV CALC-SCNC: 1.7 MMOL/L — SIGNIFICANT CHANGE UP (ref -2–3)
BASE EXCESS BLDV CALC-SCNC: 1.7 MMOL/L — SIGNIFICANT CHANGE UP (ref -2–3)
BASOPHILS # BLD AUTO: 0 K/UL — SIGNIFICANT CHANGE UP (ref 0–0.2)
BASOPHILS # BLD AUTO: 0 K/UL — SIGNIFICANT CHANGE UP (ref 0–0.2)
BASOPHILS NFR BLD AUTO: 0 % — SIGNIFICANT CHANGE UP (ref 0–2)
BASOPHILS NFR BLD AUTO: 0 % — SIGNIFICANT CHANGE UP (ref 0–2)
BILIRUB DIRECT SERPL-MCNC: 0.2 MG/DL — SIGNIFICANT CHANGE UP (ref 0–0.3)
BILIRUB DIRECT SERPL-MCNC: 0.2 MG/DL — SIGNIFICANT CHANGE UP (ref 0–0.3)
BILIRUB INDIRECT FLD-MCNC: 0.1 MG/DL — LOW (ref 0.2–1)
BILIRUB INDIRECT FLD-MCNC: 0.1 MG/DL — LOW (ref 0.2–1)
BILIRUB SERPL-MCNC: 0.3 MG/DL — SIGNIFICANT CHANGE UP (ref 0.2–1.2)
BILIRUB SERPL-MCNC: 0.3 MG/DL — SIGNIFICANT CHANGE UP (ref 0.2–1.2)
BILIRUB UR-MCNC: NEGATIVE — SIGNIFICANT CHANGE UP
BILIRUB UR-MCNC: NEGATIVE — SIGNIFICANT CHANGE UP
BUN SERPL-MCNC: 10 MG/DL — SIGNIFICANT CHANGE UP (ref 7–23)
BUN SERPL-MCNC: 12 MG/DL — SIGNIFICANT CHANGE UP (ref 7–23)
BUN SERPL-MCNC: 12 MG/DL — SIGNIFICANT CHANGE UP (ref 7–23)
BUN SERPL-MCNC: 9 MG/DL — SIGNIFICANT CHANGE UP (ref 7–23)
BUN SERPL-MCNC: 9 MG/DL — SIGNIFICANT CHANGE UP (ref 7–23)
CA-I SERPL-SCNC: 1.14 MMOL/L — LOW (ref 1.15–1.33)
CA-I SERPL-SCNC: 1.14 MMOL/L — LOW (ref 1.15–1.33)
CALCIUM SERPL-MCNC: 7.5 MG/DL — LOW (ref 8.4–10.5)
CALCIUM SERPL-MCNC: 7.5 MG/DL — LOW (ref 8.4–10.5)
CALCIUM SERPL-MCNC: 8.1 MG/DL — LOW (ref 8.4–10.5)
CALCIUM SERPL-MCNC: 8.1 MG/DL — LOW (ref 8.4–10.5)
CALCIUM SERPL-MCNC: 8.2 MG/DL — LOW (ref 8.4–10.5)
CALCIUM SERPL-MCNC: 8.2 MG/DL — LOW (ref 8.4–10.5)
CALCIUM SERPL-MCNC: 9.2 MG/DL — SIGNIFICANT CHANGE UP (ref 8.4–10.5)
CALCIUM SERPL-MCNC: 9.2 MG/DL — SIGNIFICANT CHANGE UP (ref 8.4–10.5)
CAST: 6 /LPF — HIGH (ref 0–4)
CAST: 6 /LPF — HIGH (ref 0–4)
CHLORIDE SERPL-SCNC: 83 MMOL/L — LOW (ref 96–108)
CHLORIDE SERPL-SCNC: 83 MMOL/L — LOW (ref 96–108)
CHLORIDE SERPL-SCNC: 93 MMOL/L — LOW (ref 96–108)
CK MB CFR SERPL CALC: 2.3 NG/ML — SIGNIFICANT CHANGE UP (ref 0–6.7)
CK MB CFR SERPL CALC: 2.3 NG/ML — SIGNIFICANT CHANGE UP (ref 0–6.7)
CK SERPL-CCNC: 32 U/L — SIGNIFICANT CHANGE UP (ref 25–170)
CK SERPL-CCNC: 32 U/L — SIGNIFICANT CHANGE UP (ref 25–170)
CO2 BLDV-SCNC: 27 MMOL/L — HIGH (ref 22–26)
CO2 BLDV-SCNC: 27 MMOL/L — HIGH (ref 22–26)
CO2 SERPL-SCNC: 18 MMOL/L — LOW (ref 22–31)
CO2 SERPL-SCNC: 18 MMOL/L — LOW (ref 22–31)
CO2 SERPL-SCNC: 21 MMOL/L — LOW (ref 22–31)
CO2 SERPL-SCNC: 21 MMOL/L — LOW (ref 22–31)
CO2 SERPL-SCNC: 22 MMOL/L — SIGNIFICANT CHANGE UP (ref 22–31)
COLOR SPEC: YELLOW — SIGNIFICANT CHANGE UP
COLOR SPEC: YELLOW — SIGNIFICANT CHANGE UP
CREAT SERPL-MCNC: 0.59 MG/DL — SIGNIFICANT CHANGE UP (ref 0.5–1.3)
CREAT SERPL-MCNC: 0.59 MG/DL — SIGNIFICANT CHANGE UP (ref 0.5–1.3)
CREAT SERPL-MCNC: 0.62 MG/DL — SIGNIFICANT CHANGE UP (ref 0.5–1.3)
CREAT SERPL-MCNC: 0.62 MG/DL — SIGNIFICANT CHANGE UP (ref 0.5–1.3)
CREAT SERPL-MCNC: 0.63 MG/DL — SIGNIFICANT CHANGE UP (ref 0.5–1.3)
CREAT SERPL-MCNC: 0.63 MG/DL — SIGNIFICANT CHANGE UP (ref 0.5–1.3)
CREAT SERPL-MCNC: 0.76 MG/DL — SIGNIFICANT CHANGE UP (ref 0.5–1.3)
CREAT SERPL-MCNC: 0.76 MG/DL — SIGNIFICANT CHANGE UP (ref 0.5–1.3)
CRP SERPL-MCNC: 14.8 MG/L — HIGH (ref 0–4)
CRP SERPL-MCNC: 14.8 MG/L — HIGH (ref 0–4)
D DIMER BLD IA.RAPID-MCNC: 363 NG/ML DDU — HIGH
D DIMER BLD IA.RAPID-MCNC: 363 NG/ML DDU — HIGH
DIFF PNL FLD: ABNORMAL
DIFF PNL FLD: ABNORMAL
EGFR: 100 ML/MIN/1.73M2 — SIGNIFICANT CHANGE UP
EGFR: 100 ML/MIN/1.73M2 — SIGNIFICANT CHANGE UP
EGFR: 113 ML/MIN/1.73M2 — SIGNIFICANT CHANGE UP
EGFR: 115 ML/MIN/1.73M2 — SIGNIFICANT CHANGE UP
EGFR: 115 ML/MIN/1.73M2 — SIGNIFICANT CHANGE UP
EOSINOPHIL # BLD AUTO: 0 K/UL — SIGNIFICANT CHANGE UP (ref 0–0.5)
EOSINOPHIL # BLD AUTO: 0 K/UL — SIGNIFICANT CHANGE UP (ref 0–0.5)
EOSINOPHIL NFR BLD AUTO: 0 % — SIGNIFICANT CHANGE UP (ref 0–6)
EOSINOPHIL NFR BLD AUTO: 0 % — SIGNIFICANT CHANGE UP (ref 0–6)
ERYTHROCYTE [SEDIMENTATION RATE] IN BLOOD: 42 MM/HR — HIGH
ERYTHROCYTE [SEDIMENTATION RATE] IN BLOOD: 42 MM/HR — HIGH
GAS PNL BLDV: 126 MMOL/L — LOW (ref 136–145)
GAS PNL BLDV: 126 MMOL/L — LOW (ref 136–145)
GAS PNL BLDV: SIGNIFICANT CHANGE UP
GAS PNL BLDV: SIGNIFICANT CHANGE UP
GIANT PLATELETS BLD QL SMEAR: PRESENT — SIGNIFICANT CHANGE UP
GIANT PLATELETS BLD QL SMEAR: PRESENT — SIGNIFICANT CHANGE UP
GLUCOSE BLDC GLUCOMTR-MCNC: >600 MG/DL — CRITICAL HIGH (ref 70–99)
GLUCOSE SERPL-MCNC: 550 MG/DL — CRITICAL HIGH (ref 70–99)
GLUCOSE SERPL-MCNC: 550 MG/DL — CRITICAL HIGH (ref 70–99)
GLUCOSE SERPL-MCNC: 631 MG/DL — CRITICAL HIGH (ref 70–99)
GLUCOSE SERPL-MCNC: 631 MG/DL — CRITICAL HIGH (ref 70–99)
GLUCOSE SERPL-MCNC: 654 MG/DL — CRITICAL HIGH (ref 70–99)
GLUCOSE SERPL-MCNC: 654 MG/DL — CRITICAL HIGH (ref 70–99)
GLUCOSE SERPL-MCNC: 697 MG/DL — CRITICAL HIGH (ref 70–99)
GLUCOSE SERPL-MCNC: 697 MG/DL — CRITICAL HIGH (ref 70–99)
GLUCOSE UR QL: >=1000 MG/DL
GLUCOSE UR QL: >=1000 MG/DL
HCG UR QL: NEGATIVE — SIGNIFICANT CHANGE UP
HCG UR QL: NEGATIVE — SIGNIFICANT CHANGE UP
HCO3 BLDV-SCNC: 26 MMOL/L — SIGNIFICANT CHANGE UP (ref 22–29)
HCO3 BLDV-SCNC: 26 MMOL/L — SIGNIFICANT CHANGE UP (ref 22–29)
HCT VFR BLD CALC: 40.8 % — SIGNIFICANT CHANGE UP (ref 34.5–45)
HCT VFR BLD CALC: 40.8 % — SIGNIFICANT CHANGE UP (ref 34.5–45)
HGB BLD-MCNC: 13.1 G/DL — SIGNIFICANT CHANGE UP (ref 11.5–15.5)
HGB BLD-MCNC: 13.1 G/DL — SIGNIFICANT CHANGE UP (ref 11.5–15.5)
INR BLD: 1.01 — SIGNIFICANT CHANGE UP (ref 0.85–1.18)
INR BLD: 1.01 — SIGNIFICANT CHANGE UP (ref 0.85–1.18)
KETONES UR-MCNC: NEGATIVE MG/DL — SIGNIFICANT CHANGE UP
KETONES UR-MCNC: NEGATIVE MG/DL — SIGNIFICANT CHANGE UP
LACTATE SERPL-SCNC: 1.8 MMOL/L — SIGNIFICANT CHANGE UP (ref 0.5–2)
LACTATE SERPL-SCNC: 1.8 MMOL/L — SIGNIFICANT CHANGE UP (ref 0.5–2)
LACTATE SERPL-SCNC: 2.4 MMOL/L — HIGH (ref 0.5–2)
LACTATE SERPL-SCNC: 2.4 MMOL/L — HIGH (ref 0.5–2)
LACTATE SERPL-SCNC: 6.9 MMOL/L — CRITICAL HIGH (ref 0.5–2)
LACTATE SERPL-SCNC: 6.9 MMOL/L — CRITICAL HIGH (ref 0.5–2)
LEUKOCYTE ESTERASE UR-ACNC: ABNORMAL
LEUKOCYTE ESTERASE UR-ACNC: ABNORMAL
LIDOCAIN IGE QN: 18 U/L — SIGNIFICANT CHANGE UP (ref 7–60)
LIDOCAIN IGE QN: 18 U/L — SIGNIFICANT CHANGE UP (ref 7–60)
LYMPHOCYTES # BLD AUTO: 2.22 K/UL — SIGNIFICANT CHANGE UP (ref 1–3.3)
LYMPHOCYTES # BLD AUTO: 2.22 K/UL — SIGNIFICANT CHANGE UP (ref 1–3.3)
LYMPHOCYTES # BLD AUTO: 22.3 % — SIGNIFICANT CHANGE UP (ref 13–44)
LYMPHOCYTES # BLD AUTO: 22.3 % — SIGNIFICANT CHANGE UP (ref 13–44)
MAGNESIUM SERPL-MCNC: 1.7 MG/DL — SIGNIFICANT CHANGE UP (ref 1.6–2.6)
MAGNESIUM SERPL-MCNC: 1.7 MG/DL — SIGNIFICANT CHANGE UP (ref 1.6–2.6)
MANUAL SMEAR VERIFICATION: SIGNIFICANT CHANGE UP
MANUAL SMEAR VERIFICATION: SIGNIFICANT CHANGE UP
MCHC RBC-ENTMCNC: 23.6 PG — LOW (ref 27–34)
MCHC RBC-ENTMCNC: 23.6 PG — LOW (ref 27–34)
MCHC RBC-ENTMCNC: 32.1 GM/DL — SIGNIFICANT CHANGE UP (ref 32–36)
MCHC RBC-ENTMCNC: 32.1 GM/DL — SIGNIFICANT CHANGE UP (ref 32–36)
MCV RBC AUTO: 73.6 FL — LOW (ref 80–100)
MCV RBC AUTO: 73.6 FL — LOW (ref 80–100)
MICROCYTES BLD QL: SIGNIFICANT CHANGE UP
MICROCYTES BLD QL: SIGNIFICANT CHANGE UP
MONOCYTES # BLD AUTO: 0.72 K/UL — SIGNIFICANT CHANGE UP (ref 0–0.9)
MONOCYTES # BLD AUTO: 0.72 K/UL — SIGNIFICANT CHANGE UP (ref 0–0.9)
MONOCYTES NFR BLD AUTO: 7.2 % — SIGNIFICANT CHANGE UP (ref 2–14)
MONOCYTES NFR BLD AUTO: 7.2 % — SIGNIFICANT CHANGE UP (ref 2–14)
NEUTROPHILS # BLD AUTO: 7.01 K/UL — SIGNIFICANT CHANGE UP (ref 1.8–7.4)
NEUTROPHILS # BLD AUTO: 7.01 K/UL — SIGNIFICANT CHANGE UP (ref 1.8–7.4)
NEUTROPHILS NFR BLD AUTO: 70.5 % — SIGNIFICANT CHANGE UP (ref 43–77)
NEUTROPHILS NFR BLD AUTO: 70.5 % — SIGNIFICANT CHANGE UP (ref 43–77)
NITRITE UR-MCNC: NEGATIVE — SIGNIFICANT CHANGE UP
NITRITE UR-MCNC: NEGATIVE — SIGNIFICANT CHANGE UP
NT-PROBNP SERPL-SCNC: 176 PG/ML — SIGNIFICANT CHANGE UP (ref 0–300)
NT-PROBNP SERPL-SCNC: 176 PG/ML — SIGNIFICANT CHANGE UP (ref 0–300)
PCO2 BLDV: 38 MMHG — LOW (ref 39–42)
PCO2 BLDV: 38 MMHG — LOW (ref 39–42)
PH BLDV: 7.44 — HIGH (ref 7.32–7.43)
PH BLDV: 7.44 — HIGH (ref 7.32–7.43)
PH UR: 6.5 — SIGNIFICANT CHANGE UP (ref 5–8)
PH UR: 6.5 — SIGNIFICANT CHANGE UP (ref 5–8)
PLAT MORPH BLD: ABNORMAL
PLAT MORPH BLD: ABNORMAL
PLATELET # BLD AUTO: 386 K/UL — SIGNIFICANT CHANGE UP (ref 150–400)
PLATELET # BLD AUTO: 386 K/UL — SIGNIFICANT CHANGE UP (ref 150–400)
PO2 BLDV: <33 MMHG — SIGNIFICANT CHANGE UP (ref 25–45)
PO2 BLDV: <33 MMHG — SIGNIFICANT CHANGE UP (ref 25–45)
POLYCHROMASIA BLD QL SMEAR: SLIGHT — SIGNIFICANT CHANGE UP
POLYCHROMASIA BLD QL SMEAR: SLIGHT — SIGNIFICANT CHANGE UP
POTASSIUM BLDV-SCNC: 3 MMOL/L — LOW (ref 3.5–5.1)
POTASSIUM BLDV-SCNC: 3 MMOL/L — LOW (ref 3.5–5.1)
POTASSIUM SERPL-MCNC: 2.8 MMOL/L — CRITICAL LOW (ref 3.5–5.3)
POTASSIUM SERPL-MCNC: 3.2 MMOL/L — LOW (ref 3.5–5.3)
POTASSIUM SERPL-MCNC: 3.2 MMOL/L — LOW (ref 3.5–5.3)
POTASSIUM SERPL-MCNC: 3.5 MMOL/L — SIGNIFICANT CHANGE UP (ref 3.5–5.3)
POTASSIUM SERPL-MCNC: 3.5 MMOL/L — SIGNIFICANT CHANGE UP (ref 3.5–5.3)
POTASSIUM SERPL-SCNC: 2.8 MMOL/L — CRITICAL LOW (ref 3.5–5.3)
POTASSIUM SERPL-SCNC: 3.2 MMOL/L — LOW (ref 3.5–5.3)
POTASSIUM SERPL-SCNC: 3.2 MMOL/L — LOW (ref 3.5–5.3)
POTASSIUM SERPL-SCNC: 3.5 MMOL/L — SIGNIFICANT CHANGE UP (ref 3.5–5.3)
POTASSIUM SERPL-SCNC: 3.5 MMOL/L — SIGNIFICANT CHANGE UP (ref 3.5–5.3)
PROCALCITONIN SERPL-MCNC: 0.21 NG/ML — HIGH (ref 0.02–0.1)
PROCALCITONIN SERPL-MCNC: 0.21 NG/ML — HIGH (ref 0.02–0.1)
PROT SERPL-MCNC: 7.7 G/DL — SIGNIFICANT CHANGE UP (ref 6–8.3)
PROT SERPL-MCNC: 7.7 G/DL — SIGNIFICANT CHANGE UP (ref 6–8.3)
PROT UR-MCNC: NEGATIVE MG/DL — SIGNIFICANT CHANGE UP
PROT UR-MCNC: NEGATIVE MG/DL — SIGNIFICANT CHANGE UP
PROTHROM AB SERPL-ACNC: 11.5 SEC — SIGNIFICANT CHANGE UP (ref 9.5–13)
PROTHROM AB SERPL-ACNC: 11.5 SEC — SIGNIFICANT CHANGE UP (ref 9.5–13)
RAPID RVP RESULT: SIGNIFICANT CHANGE UP
RAPID RVP RESULT: SIGNIFICANT CHANGE UP
RBC # BLD: 5.54 M/UL — HIGH (ref 3.8–5.2)
RBC # BLD: 5.54 M/UL — HIGH (ref 3.8–5.2)
RBC # FLD: 15.6 % — HIGH (ref 10.3–14.5)
RBC # FLD: 15.6 % — HIGH (ref 10.3–14.5)
RBC BLD AUTO: ABNORMAL
RBC BLD AUTO: ABNORMAL
RBC CASTS # UR COMP ASSIST: 2 /HPF — SIGNIFICANT CHANGE UP (ref 0–4)
RBC CASTS # UR COMP ASSIST: 2 /HPF — SIGNIFICANT CHANGE UP (ref 0–4)
SAO2 % BLDV: 56 % — LOW (ref 67–88)
SAO2 % BLDV: 56 % — LOW (ref 67–88)
SARS-COV-2 RNA SPEC QL NAA+PROBE: SIGNIFICANT CHANGE UP
SARS-COV-2 RNA SPEC QL NAA+PROBE: SIGNIFICANT CHANGE UP
SODIUM SERPL-SCNC: 125 MMOL/L — LOW (ref 135–145)
SODIUM SERPL-SCNC: 125 MMOL/L — LOW (ref 135–145)
SODIUM SERPL-SCNC: 127 MMOL/L — LOW (ref 135–145)
SODIUM SERPL-SCNC: 127 MMOL/L — LOW (ref 135–145)
SODIUM SERPL-SCNC: 128 MMOL/L — LOW (ref 135–145)
SODIUM SERPL-SCNC: 128 MMOL/L — LOW (ref 135–145)
SODIUM SERPL-SCNC: 129 MMOL/L — LOW (ref 135–145)
SODIUM SERPL-SCNC: 129 MMOL/L — LOW (ref 135–145)
SP GR SPEC: >1.03 — HIGH (ref 1–1.03)
SP GR SPEC: >1.03 — HIGH (ref 1–1.03)
SQUAMOUS # UR AUTO: 1 /HPF — SIGNIFICANT CHANGE UP (ref 0–5)
SQUAMOUS # UR AUTO: 1 /HPF — SIGNIFICANT CHANGE UP (ref 0–5)
TROPONIN T, HIGH SENSITIVITY RESULT: 31 NG/L — SIGNIFICANT CHANGE UP (ref 0–51)
TROPONIN T, HIGH SENSITIVITY RESULT: 31 NG/L — SIGNIFICANT CHANGE UP (ref 0–51)
TROPONIN T, HIGH SENSITIVITY RESULT: 38 NG/L — SIGNIFICANT CHANGE UP (ref 0–51)
TROPONIN T, HIGH SENSITIVITY RESULT: 38 NG/L — SIGNIFICANT CHANGE UP (ref 0–51)
TSH SERPL-MCNC: 0.8 UIU/ML — SIGNIFICANT CHANGE UP (ref 0.27–4.2)
TSH SERPL-MCNC: 0.8 UIU/ML — SIGNIFICANT CHANGE UP (ref 0.27–4.2)
UROBILINOGEN FLD QL: 0.2 MG/DL — SIGNIFICANT CHANGE UP (ref 0.2–1)
UROBILINOGEN FLD QL: 0.2 MG/DL — SIGNIFICANT CHANGE UP (ref 0.2–1)
WBC # BLD: 9.94 K/UL — SIGNIFICANT CHANGE UP (ref 3.8–10.5)
WBC # BLD: 9.94 K/UL — SIGNIFICANT CHANGE UP (ref 3.8–10.5)
WBC # FLD AUTO: 9.94 K/UL — SIGNIFICANT CHANGE UP (ref 3.8–10.5)
WBC # FLD AUTO: 9.94 K/UL — SIGNIFICANT CHANGE UP (ref 3.8–10.5)
WBC CLUMPS # UR AUTO: PRESENT
WBC CLUMPS # UR AUTO: PRESENT
WBC UR QL: 24 /HPF — HIGH (ref 0–5)
WBC UR QL: 24 /HPF — HIGH (ref 0–5)
YEAST-LIKE CELLS: PRESENT
YEAST-LIKE CELLS: PRESENT

## 2023-12-31 PROCEDURE — 71275 CT ANGIOGRAPHY CHEST: CPT | Mod: 26,MA

## 2023-12-31 PROCEDURE — 74177 CT ABD & PELVIS W/CONTRAST: CPT | Mod: 26,MA

## 2023-12-31 PROCEDURE — 93010 ELECTROCARDIOGRAM REPORT: CPT

## 2023-12-31 PROCEDURE — 71045 X-RAY EXAM CHEST 1 VIEW: CPT | Mod: 26

## 2023-12-31 PROCEDURE — 99285 EMERGENCY DEPT VISIT HI MDM: CPT

## 2023-12-31 PROCEDURE — 73701 CT LOWER EXTREMITY W/DYE: CPT | Mod: 26,50,MA

## 2023-12-31 RX ORDER — POTASSIUM CHLORIDE 20 MEQ
40 PACKET (EA) ORAL ONCE
Refills: 0 | Status: DISCONTINUED | OUTPATIENT
Start: 2023-12-31 | End: 2023-12-31

## 2023-12-31 RX ORDER — POTASSIUM CHLORIDE 20 MEQ
10 PACKET (EA) ORAL
Refills: 0 | Status: COMPLETED | OUTPATIENT
Start: 2023-12-31 | End: 2024-01-01

## 2023-12-31 RX ORDER — IOHEXOL 300 MG/ML
30 INJECTION, SOLUTION INTRAVENOUS ONCE
Refills: 0 | Status: COMPLETED | OUTPATIENT
Start: 2023-12-31 | End: 2023-12-31

## 2023-12-31 RX ORDER — SODIUM CHLORIDE 9 MG/ML
1000 INJECTION INTRAMUSCULAR; INTRAVENOUS; SUBCUTANEOUS ONCE
Refills: 0 | Status: COMPLETED | OUTPATIENT
Start: 2023-12-31 | End: 2023-12-31

## 2023-12-31 RX ORDER — ATORVASTATIN CALCIUM 80 MG/1
80 TABLET, FILM COATED ORAL AT BEDTIME
Refills: 0 | Status: DISCONTINUED | OUTPATIENT
Start: 2023-12-31 | End: 2024-01-05

## 2023-12-31 RX ORDER — DIPHENHYDRAMINE HCL 50 MG
50 CAPSULE ORAL ONCE
Refills: 0 | Status: COMPLETED | OUTPATIENT
Start: 2023-12-31 | End: 2023-12-31

## 2023-12-31 RX ORDER — POTASSIUM CHLORIDE 20 MEQ
40 PACKET (EA) ORAL ONCE
Refills: 0 | Status: COMPLETED | OUTPATIENT
Start: 2023-12-31 | End: 2023-12-31

## 2023-12-31 RX ORDER — HYDROMORPHONE HYDROCHLORIDE 2 MG/ML
1 INJECTION INTRAMUSCULAR; INTRAVENOUS; SUBCUTANEOUS ONCE
Refills: 0 | Status: DISCONTINUED | OUTPATIENT
Start: 2023-12-31 | End: 2023-12-31

## 2023-12-31 RX ORDER — LINEZOLID 600 MG/300ML
600 INJECTION, SOLUTION INTRAVENOUS ONCE
Refills: 0 | Status: COMPLETED | OUTPATIENT
Start: 2023-12-31 | End: 2023-12-31

## 2023-12-31 RX ORDER — ERTAPENEM SODIUM 1 G/1
1000 INJECTION, POWDER, LYOPHILIZED, FOR SOLUTION INTRAMUSCULAR; INTRAVENOUS ONCE
Refills: 0 | Status: COMPLETED | OUTPATIENT
Start: 2023-12-31 | End: 2023-12-31

## 2023-12-31 RX ORDER — POTASSIUM CHLORIDE 20 MEQ
10 PACKET (EA) ORAL ONCE
Refills: 0 | Status: COMPLETED | OUTPATIENT
Start: 2023-12-31 | End: 2023-12-31

## 2023-12-31 RX ORDER — INSULIN HUMAN 100 [IU]/ML
4 INJECTION, SOLUTION SUBCUTANEOUS
Qty: 50 | Refills: 0 | Status: DISCONTINUED | OUTPATIENT
Start: 2023-12-31 | End: 2024-01-01

## 2023-12-31 RX ORDER — POTASSIUM CHLORIDE 20 MEQ
40 PACKET (EA) ORAL ONCE
Refills: 0 | Status: DISCONTINUED | OUTPATIENT
Start: 2024-01-01 | End: 2024-01-01

## 2023-12-31 RX ORDER — INSULIN HUMAN 100 [IU]/ML
10 INJECTION, SOLUTION SUBCUTANEOUS ONCE
Refills: 0 | Status: COMPLETED | OUTPATIENT
Start: 2023-12-31 | End: 2023-12-31

## 2023-12-31 RX ORDER — SODIUM CHLORIDE 9 MG/ML
1000 INJECTION, SOLUTION INTRAVENOUS
Refills: 0 | Status: DISCONTINUED | OUTPATIENT
Start: 2023-12-31 | End: 2024-01-01

## 2023-12-31 RX ORDER — APIXABAN 2.5 MG/1
5 TABLET, FILM COATED ORAL EVERY 12 HOURS
Refills: 0 | Status: DISCONTINUED | OUTPATIENT
Start: 2024-01-01 | End: 2024-01-05

## 2023-12-31 RX ORDER — MAGNESIUM SULFATE 500 MG/ML
2 VIAL (ML) INJECTION ONCE
Refills: 0 | Status: COMPLETED | OUTPATIENT
Start: 2023-12-31 | End: 2023-12-31

## 2023-12-31 RX ORDER — ONDANSETRON 8 MG/1
4 TABLET, FILM COATED ORAL ONCE
Refills: 0 | Status: COMPLETED | OUTPATIENT
Start: 2023-12-31 | End: 2023-12-31

## 2023-12-31 RX ORDER — CHLORHEXIDINE GLUCONATE 213 G/1000ML
1 SOLUTION TOPICAL
Refills: 0 | Status: DISCONTINUED | OUTPATIENT
Start: 2023-12-31 | End: 2024-01-05

## 2023-12-31 RX ADMIN — Medication 125 MILLIGRAM(S): at 13:54

## 2023-12-31 RX ADMIN — Medication 50 MILLIGRAM(S): at 16:50

## 2023-12-31 RX ADMIN — Medication 40 MILLIEQUIVALENT(S): at 20:18

## 2023-12-31 RX ADMIN — SODIUM CHLORIDE 1000 MILLILITER(S): 9 INJECTION INTRAMUSCULAR; INTRAVENOUS; SUBCUTANEOUS at 17:04

## 2023-12-31 RX ADMIN — HYDROMORPHONE HYDROCHLORIDE 1 MILLIGRAM(S): 2 INJECTION INTRAMUSCULAR; INTRAVENOUS; SUBCUTANEOUS at 21:00

## 2023-12-31 RX ADMIN — SODIUM CHLORIDE 1000 MILLILITER(S): 9 INJECTION INTRAMUSCULAR; INTRAVENOUS; SUBCUTANEOUS at 14:00

## 2023-12-31 RX ADMIN — IOHEXOL 30 MILLILITER(S): 300 INJECTION, SOLUTION INTRAVENOUS at 14:44

## 2023-12-31 RX ADMIN — Medication 100 MILLIEQUIVALENT(S): at 16:59

## 2023-12-31 RX ADMIN — ERTAPENEM SODIUM 120 MILLIGRAM(S): 1 INJECTION, POWDER, LYOPHILIZED, FOR SOLUTION INTRAMUSCULAR; INTRAVENOUS at 14:44

## 2023-12-31 RX ADMIN — Medication 25 GRAM(S): at 20:03

## 2023-12-31 RX ADMIN — Medication 40 MILLIEQUIVALENT(S): at 16:55

## 2023-12-31 RX ADMIN — INSULIN HUMAN 10 UNIT(S): 100 INJECTION, SOLUTION SUBCUTANEOUS at 20:04

## 2023-12-31 RX ADMIN — HYDROMORPHONE HYDROCHLORIDE 1 MILLIGRAM(S): 2 INJECTION INTRAMUSCULAR; INTRAVENOUS; SUBCUTANEOUS at 17:04

## 2023-12-31 RX ADMIN — ONDANSETRON 4 MILLIGRAM(S): 8 TABLET, FILM COATED ORAL at 16:16

## 2023-12-31 RX ADMIN — HYDROMORPHONE HYDROCHLORIDE 1 MILLIGRAM(S): 2 INJECTION INTRAMUSCULAR; INTRAVENOUS; SUBCUTANEOUS at 15:05

## 2023-12-31 RX ADMIN — Medication 100 MILLIEQUIVALENT(S): at 20:03

## 2023-12-31 NOTE — ED PROVIDER NOTE - CLINICAL SUMMARY MEDICAL DECISION MAKING FREE TEXT BOX
42 y/o F with PMHx necrotizing fasciitis presents to ED c/o 3 weeks of increasing soft tissue swelling to left groin. Consider recurrent necrotizing fasciitis vs cellulitis. Pt is nontoxic appearing and afebrile, although is tachycardic. Will evaluate for sepsis, give abx, and obtain CT for further evaluation. Will also evaluate further for PE and ACS. 44 y/o F with PMHx necrotizing fasciitis presents to ED c/o 3 weeks of increasing soft tissue swelling to left groin. Consider recurrent necrotizing fasciitis vs cellulitis. Pt is nontoxic appearing and afebrile, although is tachycardic. Will evaluate for sepsis, give abx, and obtain CT for further evaluation. Will also evaluate further for PE and ACS. 42 y/o F with PMHx necrotizing fasciitis presents to ED c/o 3 weeks of increasing soft tissue swelling to left groin. Consider recurrent necrotizing fasciitis vs cellulitis. Pt is nontoxic appearing and afebrile, although is tachycardic. Will evaluate for sepsis, give abx, and obtain CT for further evaluation. Will also evaluate further for PE and ACS.    no collection or apparent nec fascitis , admitted to MICU for further monitoring / IV antibiotics , K repletion prior to insulin as hypoK, no DKA / PH ok , lactic improving

## 2023-12-31 NOTE — CONSULT NOTE ADULT - ASSESSMENT
ASSESSMENT/ PLAN:  43F w/ PMHx Asthma, CVA (11/2021; residual L>R weakness), PE (4/2023 on Eliquis, last taken 12/29), T2DM (on insulin), HTN, HLD with hx abdominal necrotizing fasciitis s/p diverting colostomy creation (11/2021; [facility]), CAD (on , 2 NAT in 6/23) with PSHx of numerous abdominal wall hernia repairs who presents to [facility] for a constilation of medical compliants. Surgery consulted for evaluation of abdominal wall and groin erythema to rule out necrtoizing fasciitis.   No crepitus, flutuance or erythema appreciated on exam.   CTAP without evidence of drainable collection or necrotizing fasciitis    Recommendations:  - No acute surgical intervention indicated at this time  - Recommend admission for control of blood sugar   - Team 4C will continue to follow. Please call with any questions/concerns. ASSESSMENT/ PLAN:  43F w/ PMHx Asthma, CVA (11/2021; residual L>R weakness), PE (4/2023 on Eliquis, last taken 12/29), T2DM (on insulin), HTN, HLD with hx abdominal necrotizing fasciitis s/p diverting colostomy creation (11/2021; [facility]), CAD (on , 2 NAT in 6/23) with PSHx of numerous abdominal wall hernia repairs who presents to [facility] for a constilation of medical compliants. Surgery consulted for evaluation of abdominal wall and groin erythema to rule out necrtoizing fasciitis.   No crepitus, flutuance or erythema appreciated on exam.   CTAP without evidence of drainable collection or necrotizing fasciitis    Recommendations:  - No acute surgical intervention indicated at this time  - Recommend admission for control of blood sugar   - Team 4C will continue to follow. Please call with any questions/concerns.

## 2023-12-31 NOTE — ED PROVIDER NOTE - PHYSICAL EXAMINATION
VITAL SIGNS: I have reviewed nursing notes and confirm.  CONSTITUTIONAL: Obese; Well-developed; in no acute distress.  SKIN: Agree with RN documentation regarding decubitus evaluation. Remainder of skin exam is warm and dry, no acute rash. Large intertrigo with fungal appearing infection in crease of lower abdomen.   HEAD: Normocephalic; atraumatic.  EYES: PERRL, EOM intact; conjunctiva and sclera clear.  ENT: No nasal discharge; airway clear.  NECK: Supple; non tender.  CARD: S1, S2 normal; no murmurs, gallops, or rubs. Tachycardic, regular rhythm.  RESP: No wheezes, rales or rhonchi.  ABD: Normal bowel sounds; soft; non-distended; non-tender; no hepatosplenomegaly. Ostomy noted with surrounding skin irritation and rash.   : Left groin with swelling to left mons and left inner thigh fullness with +tenderness. No palpable collection.   EXT: Normal ROM. No clubbing, cyanosis or edema.  LYMPH: No acute cervical adenopathy.  NEURO: Alert, oriented. Grossly unremarkable.  PSYCH: Cooperative, appropriate.

## 2023-12-31 NOTE — CONSULT NOTE ADULT - ATTENDING COMMENTS
43F w/ PMHx Asthma, CVA (11/2021; residual L>R weakness), PE (4/2023 on Eliquis, last taken 12/29), T2DM (on insulin), HTN, HLD with hx abdominal necrotizing fasciitis s/p diverting colostomy creation (11/2021; [facility]), CAD (on , 2 NAT in 6/23) with PSHx of numerous abdominal wall hernia repairs who presents to [facility] for a constellation of medical complaints. Surgery consulted for evaluation of abdominal wall and groin erythema to rule out necrotizing fasciitis and parastomal hernia.   No crepitus, fluctuance or erythema appreciated on exam.   CTAP without evidence of drainable collection or necrotizing fasciitis    Recommendations:  - No acute surgical intervention indicated at this time  - Continue strict blood glucose control   - Consult ID  - Team 4C will continue to follow. Please call with any questions/concerns. 43F w/ PMHx Asthma, CVA (11/2021; residual L>R weakness), PE (4/2023 on Eliquis, last taken 12/29), T2DM (on insulin), HTN, HLD with hx abdominal necrotizing fasciitis s/p diverting colostomy creation (11/2021; [facility]), CAD (on , 2 NAT in 6/23) with PSHx of numerous abdominal wall hernia repairs who presents to [facility] for a constellation of medical complaints. Surgery consulted for evaluation of abdominal wall and groin erythema to rule out necrotizing fasciitis and parastomal hernia.   No crepitus, fluctuance or erythema appreciated on exam.   CTAP without evidence of drainable collection or necrotizing fasciitis    Recommendations:  - No acute surgical intervention indicated at this time  - Continue strict blood glucose control   - Consult ID  - Team 4C will continue to follow. Please call with any questions/concerns.

## 2023-12-31 NOTE — ED ADULT NURSE NOTE - NSFALLHARMRISKINTERV_ED_ALL_ED
Assistance OOB with selected safe patient handling equipment if applicable/Communicate risk of Fall with Harm to all staff, patient, and family/Monitor gait and stability/Provide patient with walking aids/Provide visual cue: red socks, yellow wristband, yellow gown, etc/Reinforce activity limits and safety measures with patient and family/Bed in lowest position, wheels locked, appropriate side rails in place/Call bell, personal items and telephone in reach/Instruct patient to call for assistance before getting out of bed/chair/stretcher/Non-slip footwear applied when patient is off stretcher/[place] to call system/Physically safe environment - no spills, clutter or unnecessary equipment/Purposeful Proactive Rounding/Room/bathroom lighting operational, light cord in reach Assistance OOB with selected safe patient handling equipment if applicable/Communicate risk of Fall with Harm to all staff, patient, and family/Monitor gait and stability/Provide patient with walking aids/Provide visual cue: red socks, yellow wristband, yellow gown, etc/Reinforce activity limits and safety measures with patient and family/Bed in lowest position, wheels locked, appropriate side rails in place/Call bell, personal items and telephone in reach/Instruct patient to call for assistance before getting out of bed/chair/stretcher/Non-slip footwear applied when patient is off stretcher/[place] to call system/Physically safe environment - no spills, clutter or unnecessary equipment/Purposeful Proactive Rounding/Room/bathroom lighting operational, light cord in reach

## 2023-12-31 NOTE — ED PROVIDER NOTE - CARE PLAN
1 Principal Discharge DX:	Cellulitis  Secondary Diagnosis:	Acute UTI  Secondary Diagnosis:	Hyperglycemia

## 2023-12-31 NOTE — ED ADULT NURSE REASSESSMENT NOTE - NS ED NURSE REASSESS COMMENT FT1
Pt evaluated by surgery. Requested Ertapenem from pharmacist, waiting for it to be brought.
Assumed care of pt. IV Potassium and IV magnesium infusing. Pt c/o generalized body pain, requesting dialudid. MD Jama notified.

## 2023-12-31 NOTE — ED PROVIDER NOTE - OBJECTIVE STATEMENT
44 y/o F with complicated PMHx including asthma, CVA, PE, DM, HTN, HLD, extensive necrotizing fasciitis in 2021 with multiple surgeries resulting in colostomy and skin grafting of right leg, klebsiella urosepsis, and multiple abx allergies presents to ED c/o approximately 3 weeks of new swelling at left groin and skin of left side of upper left thigh. Swelling has been increasing and now with discomfort when sitting or wiping. Denies fevers or discharge from site. Also c/o intermittent nonpleuritic chest pain, occasionally at rest and occasionally with exertion, during the same time frame. Lastly, pt is c/o SOB, lightheadedness, intermittent nausea/vomiting, and abdominal discomfort over the last several weeks.

## 2023-12-31 NOTE — PATIENT PROFILE ADULT - FALL HARM RISK - HARM RISK INTERVENTIONS
Assistance with ambulation/Assistance OOB with selected safe patient handling equipment/Communicate Risk of Fall with Harm to all staff/Discuss with provider need for PT consult/Monitor gait and stability/Provide patient with walking aids - walker, cane, crutches/Reinforce activity limits and safety measures with patient and family/Tailored Fall Risk Interventions/Visual Cue: Yellow wristband and red socks/Bed in lowest position, wheels locked, appropriate side rails in place/Call bell, personal items and telephone in reach/Instruct patient to call for assistance before getting out of bed or chair/Non-slip footwear when patient is out of bed/[place] to call system/Physically safe environment - no spills, clutter or unnecessary equipment/Purposeful Proactive Rounding/Room/bathroom lighting operational, light cord in reach Assistance with ambulation/Assistance OOB with selected safe patient handling equipment/Communicate Risk of Fall with Harm to all staff/Discuss with provider need for PT consult/Monitor gait and stability/Provide patient with walking aids - walker, cane, crutches/Reinforce activity limits and safety measures with patient and family/Tailored Fall Risk Interventions/Visual Cue: Yellow wristband and red socks/Bed in lowest position, wheels locked, appropriate side rails in place/Call bell, personal items and telephone in reach/Instruct patient to call for assistance before getting out of bed or chair/Non-slip footwear when patient is out of bed/[place] to call system/Physically safe environment - no spills, clutter or unnecessary equipment/Purposeful Proactive Rounding/Room/bathroom lighting operational, light cord in reach

## 2023-12-31 NOTE — CONSULT NOTE ADULT - SUBJECTIVE AND OBJECTIVE BOX
SURGERY CONSULT  ==============================================================================================================  HPI:  Patient is a 43F w/ PMHx Asthma, CVA (2021; residual L>R weakness), PE (2023 on Eliquis, last taken ), T2DM (on insulin), HTN, HLD with hx abdominal necrotizing fasciitis s/p diverting colostomy creation (2021; [facility]), CAD (on , 2 NAT in ) with PSHx of numerous abdominal wall hernia repairs who presents to [facility] for a constilation of medical compliants. Patient reports for the past month she had intermittent chest pain, SOB with dizziness and palpitation. Also endorses intermittent frequent nausea with NBNB emesis after PO intake. Denies fevers or chills at home, but does also reports for past months she had had worsening erythema near colostomy and b/l groins. Also reports burning pain near erythema with intermittent desquamation of skin. Denies draiange or purulence from any wounds. Does report this is similar to prior episode of necrotizing fasciitis.    States colonoscopy was in  prior to diverting colostomy without abnormalaties.     Medical History: asthma, CVA, PE, IDDM, HTN, HLD, CAD  Surgical History: exploratory laparotomy, LEEP, ventral hernia repair, , ostomy and suprapubic catheter,  D&C  Medications: Atrovastatin, , Eliquis BID (last taken ), insulin, motrin, benadryl, gabepentin, plavix   Allergies: Numerous  Social History: Denies tobacco, alcohol or drug use.    In the ED, patient afebrile, tachycardic:  - VITALS: Afebrile T 98.3F, , /91, saturating well on RA  - LABORATORY: WBC 9.9K no L shift, Hb 13.1, Na 125, K 2.8, Glucose 654 with AG 20, Lactate 6.9, LRINEC 4  - IMAGING: CTAP without evidence of drainable collection of necrotizing fasciitis        PAST MEDICAL & SURGICAL HISTORY:  Essential hypertension      Hyperlipidemia      Obesity      Abdominal hernia      Pre-eclampsia      Pulmonary embolism      Type 2 diabetes mellitus      History of necrotizing fasciitis      History of creation of ostomy      Suprapubic catheter      H/O: CVA (cerebrovascular accident)      Sepsis, unspecified organism      H/O LEEP      History of D&C      H/O:       H/O ventral hernia repair      H/O exploratory laparotomy      History of creation of ostomy        Home Meds: Home Medications:  acetaminophen 325 mg oral tablet: 3 tab(s) orally every 8 hours (2023 17:49)    Allergies: Allergies    iodine containing compounds (Hives; Anaphylaxis)  clindamycin (Pruritus)  fish (Hives; Urticaria)  vancomycin (Anaphylaxis; Hives)  penicillin (Hives)  shellfish. (Hives; Anaphylaxis)  amoxicillin (Short breath; Rash)    Intolerances    Bactrim I.V. (Rash (Mod to Severe))    Soc:   Advanced Directives: Presumed Full Code         VITAL SIGNS, INS/OUTS (last 24 hours):  --------------------------------------------------------------------------------------  ICU Vital Signs Last 24 Hrs  T(C): 36.7 (31 Dec 2023 18:11), Max: 36.8 (31 Dec 2023 11:52)  T(F): 98.1 (31 Dec 2023 18:11), Max: 98.3 (31 Dec 2023 11:52)  HR: 67 (31 Dec 2023 18:11) (67 - 125)  BP: 149/88 (31 Dec 2023 18:11) (134/91 - 149/88)  RR: 18 (31 Dec 2023 18:11) (18 - 18)  SpO2: 96% (31 Dec 2023 18:11) (96% - 98%)    O2 Parameters below as of 31 Dec 2023 18:11  Patient On (Oxygen Delivery Method): room air          I&O's Summary    --------------------------------------------------------------------------------------    EXAM:  General: Resting in bed, NAD  Neuro: A&Ox3, no focal deficits  CV: Tachycardic  Pulm: Equal chest wall expansion b/l, no respiratory distress  Abdomen: Soft, ND, tender around colostomy with parastomal hernia noted. Ostomy pink and patent, retracted. Erythematous, irritated skin surrouding colostomy with extension down L abdomen to b/l groins with red, beefy irritated skin - no crepitus, fluctuance or drainage noted  Extremities: WWP, No edema      LABS  --------------------------------------------------------------------------------------  Labs:  CAPILLARY BLOOD GLUCOSE      POCT Blood Glucose.: >600 mg/dL (31 Dec 2023 20:02)                          13.1   9.94  )-----------( 386      ( 31 Dec 2023 12:46 )             40.8       Auto Neutrophil %: 70.5 % (23 @ 12:46)        127<L>  |  93<L>  |  10  ----------------------------<  631<HH>  3.2<L>   |  21<L>  |  0.59      Calcium: 7.5 mg/dL (23 @ 18:54)      LFTs:             7.7  | 0.3  | 18       ------------------[187     ( 31 Dec 2023 12:46 )  3.5  | 0.2  | 15          Lipase:18     Amylase:x         Lactate, Blood: 1.8 mmol/L (23 @ 18:54)  Lactate, Blood: 2.4 mmol/L (23 @ 15:43)  Lactate, Blood: 6.9 mmol/L (23 @ 12:46)      Coags:     11.5   ----< 1.01    ( 31 Dec 2023 12:46 )     28.8        CARDIAC MARKERS ( 31 Dec 2023 12:46 )  x     / x     / 32 U/L / x     / 2.3 ng/mL          Urinalysis Basic - ( 31 Dec 2023 18:54 )    Color: x / Appearance: x / SG: x / pH: x  Gluc: 631 mg/dL / Ketone: x  / Bili: x / Urobili: x   Blood: x / Protein: x / Nitrite: x   Leuk Esterase: x / RBC: x / WBC x   Sq Epi: x / Non Sq Epi: x / Bacteria: x          --------------------------------------------------------------------------------------    OTHER LABS    IMAGING RESULTS    ACC: [id number] EXAM:  CT ABDOMEN AND PELVIS OC IC   ORDERED BY: OCTAVIANO CHONG     PROCEDURE DATE:  2023          INTERPRETATION:  CLINICAL INFORMATION: Left groin swelling. Rule out   Necrotizing fasciitis.    COMPARISON: CT 2023 and 2023.    CONTRAST/COMPLICATIONS:  IV Contrast: Isovue 370  90 cc administered   10 cc discarded  Oral Contrast: NONE  Complications: None reported at time of study completion    PROCEDURE:  CT of the Abdomen and Pelvis was performed.  CT of the proximal lower extremities was also performed.  Sagittal and coronal reformats were performed.    FINDINGS:  LOWER CHEST: Within normal limits.    LIVER: Enlarged. Hepatic steatosis.  BILE DUCTS: Normal caliber.  GALLBLADDER: Within normal limits.  SPLEEN: Within normal limits.  PANCREAS: Within normal limits.  ADRENALS: Unchanged 1.2 cm left adrenal nodule.  KIDNEYS/URETERS: Within normal limits.    BLADDER: Within normal limits.  REPRODUCTIVE ORGANS: 3.4 cm heterogeneous left adnexal lesion.    BOWEL: Left-sided colostomy. No bowel obstruction. Appendix is normal.  PERITONEUM: No ascites.  VESSELS: Within normal limits.  RETROPERITONEUM/LYMPH NODES: No lymphadenopathy.  ABDOMINAL WALL/SOFT TISSUES: Postsurgical change of the perineum and   right groin/thigh. Fat-containing parastomal hernia. No fluid collection   or evidence of necrotizing fasciitis.  BONES: Within normal limits.  PROXIMAL LOWER EXTREMITIES: No fluid collection or evidence of   necrotizing fasciitis.    IMPRESSION:  *  No fluid collection or evidence of necrotizing fasciitis involving the   left groin or left mid to upper thigh.  *  A 3.4 cm heterogeneous left adnexal lesion, possibly a hemorrhagic   cyst. Nonemergent follow-up pelvic ultrasound recommended in 2-3 months.       SURGERY CONSULT  ==============================================================================================================  HPI:  Patient is a 43F w/ PMHx Asthma, CVA (2021; residual L>R weakness), PE (2023 on Eliquis, last taken ), T2DM (on insulin), HTN, HLD with hx abdominal necrotizing fasciitis s/p diverting colostomy creation (2021; [facility]), CAD (on , 2 NAT in ) with PSHx of numerous abdominal wall hernia repairs who presents to [facility] for a constilation of medical compliants. Patient reports for the past month she had intermittent chest pain, SOB with dizziness and palpitation. Also endorses intermittent frequent nausea with NBNB emesis after PO intake. Denies fevers or chills at home, but does also reports for past months she had had worsening erythema near colostomy and b/l groins. Also reports burning pain near erythema with intermittent desquamation of skin. Denies draiange or purulence from any wounds. Does report this is similar to prior episode of necrotizing fasciitis.    States colonoscopy was in  prior to diverting colostomy without abnormalaties.     Medical History: asthma, CVA, PE, IDDM, HTN, HLD, CAD  Surgical History: exploratory laparotomy, LEEP, ventral hernia repair, , ostomy and suprapubic catheter,  D&C  Medications: Atrovastatin, , Eliquis BID (last taken ), insulin, motrin, benadryl, gabepentin, plavix   Allergies: Numerous  Social History: Denies tobacco, alcohol or drug use.    In the ED, patient afebrile, tachycardic:  - VITALS: Afebrile T 98.3F, , /91, saturating well on RA  - LABORATORY: WBC 9.9K no L shift, Hb 13.1, Na 125, K 2.8, Glucose 654 with AG 20, Lactate 6.9, LRINEC 4  - IMAGING: CTAP without evidence of drainable collection of necrotizing fasciitis        PAST MEDICAL & SURGICAL HISTORY:  Essential hypertension      Hyperlipidemia      Obesity      Abdominal hernia      Pre-eclampsia      Pulmonary embolism      Type 2 diabetes mellitus      History of necrotizing fasciitis      History of creation of ostomy      Suprapubic catheter      H/O: CVA (cerebrovascular accident)      Sepsis, unspecified organism      H/O LEEP      History of D&C      H/O:       H/O ventral hernia repair      H/O exploratory laparotomy      History of creation of ostomy        Home Meds: Home Medications:  acetaminophen 325 mg oral tablet: 3 tab(s) orally every 8 hours (2023 17:49)    Allergies: Allergies    iodine containing compounds (Hives; Anaphylaxis)  clindamycin (Pruritus)  fish (Hives; Urticaria)  vancomycin (Anaphylaxis; Hives)  penicillin (Hives)  shellfish. (Hives; Anaphylaxis)  amoxicillin (Short breath; Rash)    Intolerances    Bactrim I.V. (Rash (Mod to Severe))    Soc:   Advanced Directives: Presumed Full Code         VITAL SIGNS, INS/OUTS (last 24 hours):  --------------------------------------------------------------------------------------  ICU Vital Signs Last 24 Hrs  T(C): 36.7 (31 Dec 2023 18:11), Max: 36.8 (31 Dec 2023 11:52)  T(F): 98.1 (31 Dec 2023 18:11), Max: 98.3 (31 Dec 2023 11:52)  HR: 67 (31 Dec 2023 18:11) (67 - 125)  BP: 149/88 (31 Dec 2023 18:11) (134/91 - 149/88)  RR: 18 (31 Dec 2023 18:11) (18 - 18)  SpO2: 96% (31 Dec 2023 18:11) (96% - 98%)    O2 Parameters below as of 31 Dec 2023 18:11  Patient On (Oxygen Delivery Method): room air          I&O's Summary    --------------------------------------------------------------------------------------    EXAM:  General: Resting in bed, NAD  Neuro: A&Ox3, no focal deficits  CV: Tachycardic  Pulm: Equal chest wall expansion b/l, no respiratory distress  Abdomen: Soft, ND, tender around colostomy with parastomal hernia noted. Ostomy pink and patent, retracted. Erythematous, irritated skin surrouding colostomy with extension down L abdomen to b/l groins with red, beefy irritated skin - no crepitus, fluctuance or drainage noted  Extremities: WWP, No edema      LABS  --------------------------------------------------------------------------------------  Labs:  CAPILLARY BLOOD GLUCOSE      POCT Blood Glucose.: >600 mg/dL (31 Dec 2023 20:02)                          13.1   9.94  )-----------( 386      ( 31 Dec 2023 12:46 )             40.8       Auto Neutrophil %: 70.5 % (23 @ 12:46)        127<L>  |  93<L>  |  10  ----------------------------<  631<HH>  3.2<L>   |  21<L>  |  0.59      Calcium: 7.5 mg/dL (23 @ 18:54)      LFTs:             7.7  | 0.3  | 18       ------------------[187     ( 31 Dec 2023 12:46 )  3.5  | 0.2  | 15          Lipase:18     Amylase:x         Lactate, Blood: 1.8 mmol/L (23 @ 18:54)  Lactate, Blood: 2.4 mmol/L (23 @ 15:43)  Lactate, Blood: 6.9 mmol/L (23 @ 12:46)      Coags:     11.5   ----< 1.01    ( 31 Dec 2023 12:46 )     28.8        CARDIAC MARKERS ( 31 Dec 2023 12:46 )  x     / x     / 32 U/L / x     / 2.3 ng/mL          Urinalysis Basic - ( 31 Dec 2023 18:54 )    Color: x / Appearance: x / SG: x / pH: x  Gluc: 631 mg/dL / Ketone: x  / Bili: x / Urobili: x   Blood: x / Protein: x / Nitrite: x   Leuk Esterase: x / RBC: x / WBC x   Sq Epi: x / Non Sq Epi: x / Bacteria: x          --------------------------------------------------------------------------------------    OTHER LABS    IMAGING RESULTS    ACC: [id number] EXAM:  CT ABDOMEN AND PELVIS OC IC   ORDERED BY: OCTAVIANO CHONG     PROCEDURE DATE:  2023          INTERPRETATION:  CLINICAL INFORMATION: Left groin swelling. Rule out   Necrotizing fasciitis.    COMPARISON: CT 2023 and 2023.    CONTRAST/COMPLICATIONS:  IV Contrast: Isovue 370  90 cc administered   10 cc discarded  Oral Contrast: NONE  Complications: None reported at time of study completion    PROCEDURE:  CT of the Abdomen and Pelvis was performed.  CT of the proximal lower extremities was also performed.  Sagittal and coronal reformats were performed.    FINDINGS:  LOWER CHEST: Within normal limits.    LIVER: Enlarged. Hepatic steatosis.  BILE DUCTS: Normal caliber.  GALLBLADDER: Within normal limits.  SPLEEN: Within normal limits.  PANCREAS: Within normal limits.  ADRENALS: Unchanged 1.2 cm left adrenal nodule.  KIDNEYS/URETERS: Within normal limits.    BLADDER: Within normal limits.  REPRODUCTIVE ORGANS: 3.4 cm heterogeneous left adnexal lesion.    BOWEL: Left-sided colostomy. No bowel obstruction. Appendix is normal.  PERITONEUM: No ascites.  VESSELS: Within normal limits.  RETROPERITONEUM/LYMPH NODES: No lymphadenopathy.  ABDOMINAL WALL/SOFT TISSUES: Postsurgical change of the perineum and   right groin/thigh. Fat-containing parastomal hernia. No fluid collection   or evidence of necrotizing fasciitis.  BONES: Within normal limits.  PROXIMAL LOWER EXTREMITIES: No fluid collection or evidence of   necrotizing fasciitis.    IMPRESSION:  *  No fluid collection or evidence of necrotizing fasciitis involving the   left groin or left mid to upper thigh.  *  A 3.4 cm heterogeneous left adnexal lesion, possibly a hemorrhagic   cyst. Nonemergent follow-up pelvic ultrasound recommended in 2-3 months.

## 2023-12-31 NOTE — PATIENT PROFILE ADULT - FUNCTIONAL ASSESSMENT - BASIC MOBILITY 6.
2-calculated by average/Not able to assess (calculate score using [facility] averaging method) 2-calculated by average/Not able to assess (calculate score using [facility] averaging method)

## 2024-01-01 LAB
A1C WITH ESTIMATED AVERAGE GLUCOSE RESULT: 16.4 % — HIGH (ref 4–5.6)
A1C WITH ESTIMATED AVERAGE GLUCOSE RESULT: 16.4 % — HIGH (ref 4–5.6)
ALBUMIN SERPL ELPH-MCNC: 3 G/DL — LOW (ref 3.3–5)
ALBUMIN SERPL ELPH-MCNC: 3 G/DL — LOW (ref 3.3–5)
ALP SERPL-CCNC: 187 U/L — HIGH (ref 40–120)
ALP SERPL-CCNC: 187 U/L — HIGH (ref 40–120)
ALT FLD-CCNC: 23 U/L — SIGNIFICANT CHANGE UP (ref 10–45)
ALT FLD-CCNC: 23 U/L — SIGNIFICANT CHANGE UP (ref 10–45)
ANION GAP SERPL CALC-SCNC: 11 MMOL/L — SIGNIFICANT CHANGE UP (ref 5–17)
ANION GAP SERPL CALC-SCNC: 11 MMOL/L — SIGNIFICANT CHANGE UP (ref 5–17)
ANION GAP SERPL CALC-SCNC: 18 MMOL/L — HIGH (ref 5–17)
ANION GAP SERPL CALC-SCNC: 18 MMOL/L — HIGH (ref 5–17)
ANION GAP SERPL CALC-SCNC: 3 MMOL/L — LOW (ref 5–17)
ANION GAP SERPL CALC-SCNC: 3 MMOL/L — LOW (ref 5–17)
AST SERPL-CCNC: 48 U/L — HIGH (ref 10–40)
AST SERPL-CCNC: 48 U/L — HIGH (ref 10–40)
BASOPHILS # BLD AUTO: 0.01 K/UL — SIGNIFICANT CHANGE UP (ref 0–0.2)
BASOPHILS # BLD AUTO: 0.01 K/UL — SIGNIFICANT CHANGE UP (ref 0–0.2)
BASOPHILS NFR BLD AUTO: 0.1 % — SIGNIFICANT CHANGE UP (ref 0–2)
BASOPHILS NFR BLD AUTO: 0.1 % — SIGNIFICANT CHANGE UP (ref 0–2)
BILIRUB SERPL-MCNC: <0.2 MG/DL — SIGNIFICANT CHANGE UP (ref 0.2–1.2)
BILIRUB SERPL-MCNC: <0.2 MG/DL — SIGNIFICANT CHANGE UP (ref 0.2–1.2)
BUN SERPL-MCNC: 10 MG/DL — SIGNIFICANT CHANGE UP (ref 7–23)
BUN SERPL-MCNC: 10 MG/DL — SIGNIFICANT CHANGE UP (ref 7–23)
BUN SERPL-MCNC: 11 MG/DL — SIGNIFICANT CHANGE UP (ref 7–23)
BUN SERPL-MCNC: 11 MG/DL — SIGNIFICANT CHANGE UP (ref 7–23)
BUN SERPL-MCNC: 8 MG/DL — SIGNIFICANT CHANGE UP (ref 7–23)
BUN SERPL-MCNC: 8 MG/DL — SIGNIFICANT CHANGE UP (ref 7–23)
CALCIUM SERPL-MCNC: 5.6 MG/DL — CRITICAL LOW (ref 8.4–10.5)
CALCIUM SERPL-MCNC: 5.6 MG/DL — CRITICAL LOW (ref 8.4–10.5)
CALCIUM SERPL-MCNC: 8.3 MG/DL — LOW (ref 8.4–10.5)
CALCIUM SERPL-MCNC: 8.3 MG/DL — LOW (ref 8.4–10.5)
CALCIUM SERPL-MCNC: 8.4 MG/DL — SIGNIFICANT CHANGE UP (ref 8.4–10.5)
CALCIUM SERPL-MCNC: 8.4 MG/DL — SIGNIFICANT CHANGE UP (ref 8.4–10.5)
CHLORIDE SERPL-SCNC: 104 MMOL/L — SIGNIFICANT CHANGE UP (ref 96–108)
CHLORIDE SERPL-SCNC: 104 MMOL/L — SIGNIFICANT CHANGE UP (ref 96–108)
CHLORIDE SERPL-SCNC: 114 MMOL/L — HIGH (ref 96–108)
CHLORIDE SERPL-SCNC: 114 MMOL/L — HIGH (ref 96–108)
CHLORIDE SERPL-SCNC: 96 MMOL/L — SIGNIFICANT CHANGE UP (ref 96–108)
CHLORIDE SERPL-SCNC: 96 MMOL/L — SIGNIFICANT CHANGE UP (ref 96–108)
CO2 SERPL-SCNC: 14 MMOL/L — LOW (ref 22–31)
CO2 SERPL-SCNC: 14 MMOL/L — LOW (ref 22–31)
CO2 SERPL-SCNC: 16 MMOL/L — LOW (ref 22–31)
CO2 SERPL-SCNC: 16 MMOL/L — LOW (ref 22–31)
CO2 SERPL-SCNC: 17 MMOL/L — LOW (ref 22–31)
CO2 SERPL-SCNC: 17 MMOL/L — LOW (ref 22–31)
CREAT SERPL-MCNC: 0.46 MG/DL — LOW (ref 0.5–1.3)
CREAT SERPL-MCNC: 0.46 MG/DL — LOW (ref 0.5–1.3)
CREAT SERPL-MCNC: 0.61 MG/DL — SIGNIFICANT CHANGE UP (ref 0.5–1.3)
CREAT SERPL-MCNC: 0.61 MG/DL — SIGNIFICANT CHANGE UP (ref 0.5–1.3)
CREAT SERPL-MCNC: 0.82 MG/DL — SIGNIFICANT CHANGE UP (ref 0.5–1.3)
CREAT SERPL-MCNC: 0.82 MG/DL — SIGNIFICANT CHANGE UP (ref 0.5–1.3)
EGFR: 114 ML/MIN/1.73M2 — SIGNIFICANT CHANGE UP
EGFR: 114 ML/MIN/1.73M2 — SIGNIFICANT CHANGE UP
EGFR: 122 ML/MIN/1.73M2 — SIGNIFICANT CHANGE UP
EGFR: 122 ML/MIN/1.73M2 — SIGNIFICANT CHANGE UP
EGFR: 91 ML/MIN/1.73M2 — SIGNIFICANT CHANGE UP
EGFR: 91 ML/MIN/1.73M2 — SIGNIFICANT CHANGE UP
EOSINOPHIL # BLD AUTO: 0.03 K/UL — SIGNIFICANT CHANGE UP (ref 0–0.5)
EOSINOPHIL # BLD AUTO: 0.03 K/UL — SIGNIFICANT CHANGE UP (ref 0–0.5)
EOSINOPHIL NFR BLD AUTO: 0.2 % — SIGNIFICANT CHANGE UP (ref 0–6)
EOSINOPHIL NFR BLD AUTO: 0.2 % — SIGNIFICANT CHANGE UP (ref 0–6)
ESTIMATED AVERAGE GLUCOSE: 424 MG/DL — HIGH (ref 68–114)
ESTIMATED AVERAGE GLUCOSE: 424 MG/DL — HIGH (ref 68–114)
GLUCOSE BLDC GLUCOMTR-MCNC: 113 MG/DL — HIGH (ref 70–99)
GLUCOSE BLDC GLUCOMTR-MCNC: 113 MG/DL — HIGH (ref 70–99)
GLUCOSE BLDC GLUCOMTR-MCNC: 138 MG/DL — HIGH (ref 70–99)
GLUCOSE BLDC GLUCOMTR-MCNC: 138 MG/DL — HIGH (ref 70–99)
GLUCOSE BLDC GLUCOMTR-MCNC: 140 MG/DL — HIGH (ref 70–99)
GLUCOSE BLDC GLUCOMTR-MCNC: 140 MG/DL — HIGH (ref 70–99)
GLUCOSE BLDC GLUCOMTR-MCNC: 142 MG/DL — HIGH (ref 70–99)
GLUCOSE BLDC GLUCOMTR-MCNC: 142 MG/DL — HIGH (ref 70–99)
GLUCOSE BLDC GLUCOMTR-MCNC: 160 MG/DL — HIGH (ref 70–99)
GLUCOSE BLDC GLUCOMTR-MCNC: 160 MG/DL — HIGH (ref 70–99)
GLUCOSE BLDC GLUCOMTR-MCNC: 165 MG/DL — HIGH (ref 70–99)
GLUCOSE BLDC GLUCOMTR-MCNC: 165 MG/DL — HIGH (ref 70–99)
GLUCOSE BLDC GLUCOMTR-MCNC: 171 MG/DL — HIGH (ref 70–99)
GLUCOSE BLDC GLUCOMTR-MCNC: 171 MG/DL — HIGH (ref 70–99)
GLUCOSE BLDC GLUCOMTR-MCNC: 179 MG/DL — HIGH (ref 70–99)
GLUCOSE BLDC GLUCOMTR-MCNC: 179 MG/DL — HIGH (ref 70–99)
GLUCOSE BLDC GLUCOMTR-MCNC: 213 MG/DL — HIGH (ref 70–99)
GLUCOSE BLDC GLUCOMTR-MCNC: 213 MG/DL — HIGH (ref 70–99)
GLUCOSE BLDC GLUCOMTR-MCNC: 239 MG/DL — HIGH (ref 70–99)
GLUCOSE BLDC GLUCOMTR-MCNC: 239 MG/DL — HIGH (ref 70–99)
GLUCOSE BLDC GLUCOMTR-MCNC: 241 MG/DL — HIGH (ref 70–99)
GLUCOSE BLDC GLUCOMTR-MCNC: 241 MG/DL — HIGH (ref 70–99)
GLUCOSE BLDC GLUCOMTR-MCNC: 268 MG/DL — HIGH (ref 70–99)
GLUCOSE BLDC GLUCOMTR-MCNC: 268 MG/DL — HIGH (ref 70–99)
GLUCOSE BLDC GLUCOMTR-MCNC: 290 MG/DL — HIGH (ref 70–99)
GLUCOSE BLDC GLUCOMTR-MCNC: 290 MG/DL — HIGH (ref 70–99)
GLUCOSE BLDC GLUCOMTR-MCNC: 305 MG/DL — HIGH (ref 70–99)
GLUCOSE BLDC GLUCOMTR-MCNC: 305 MG/DL — HIGH (ref 70–99)
GLUCOSE BLDC GLUCOMTR-MCNC: 310 MG/DL — HIGH (ref 70–99)
GLUCOSE BLDC GLUCOMTR-MCNC: 310 MG/DL — HIGH (ref 70–99)
GLUCOSE BLDC GLUCOMTR-MCNC: 363 MG/DL — HIGH (ref 70–99)
GLUCOSE BLDC GLUCOMTR-MCNC: 363 MG/DL — HIGH (ref 70–99)
GLUCOSE BLDC GLUCOMTR-MCNC: 404 MG/DL — HIGH (ref 70–99)
GLUCOSE BLDC GLUCOMTR-MCNC: 404 MG/DL — HIGH (ref 70–99)
GLUCOSE BLDC GLUCOMTR-MCNC: 414 MG/DL — HIGH (ref 70–99)
GLUCOSE BLDC GLUCOMTR-MCNC: 414 MG/DL — HIGH (ref 70–99)
GLUCOSE BLDC GLUCOMTR-MCNC: 442 MG/DL — HIGH (ref 70–99)
GLUCOSE BLDC GLUCOMTR-MCNC: 442 MG/DL — HIGH (ref 70–99)
GLUCOSE BLDC GLUCOMTR-MCNC: 453 MG/DL — CRITICAL HIGH (ref 70–99)
GLUCOSE BLDC GLUCOMTR-MCNC: 453 MG/DL — CRITICAL HIGH (ref 70–99)
GLUCOSE BLDC GLUCOMTR-MCNC: 558 MG/DL — CRITICAL HIGH (ref 70–99)
GLUCOSE BLDC GLUCOMTR-MCNC: 558 MG/DL — CRITICAL HIGH (ref 70–99)
GLUCOSE BLDC GLUCOMTR-MCNC: >600 MG/DL — CRITICAL HIGH (ref 70–99)
GLUCOSE SERPL-MCNC: 247 MG/DL — HIGH (ref 70–99)
GLUCOSE SERPL-MCNC: 247 MG/DL — HIGH (ref 70–99)
GLUCOSE SERPL-MCNC: 420 MG/DL — HIGH (ref 70–99)
GLUCOSE SERPL-MCNC: 420 MG/DL — HIGH (ref 70–99)
GLUCOSE SERPL-MCNC: 488 MG/DL — CRITICAL HIGH (ref 70–99)
GLUCOSE SERPL-MCNC: 488 MG/DL — CRITICAL HIGH (ref 70–99)
HCT VFR BLD CALC: 41.7 % — SIGNIFICANT CHANGE UP (ref 34.5–45)
HCT VFR BLD CALC: 41.7 % — SIGNIFICANT CHANGE UP (ref 34.5–45)
HGB BLD-MCNC: 12.8 G/DL — SIGNIFICANT CHANGE UP (ref 11.5–15.5)
HGB BLD-MCNC: 12.8 G/DL — SIGNIFICANT CHANGE UP (ref 11.5–15.5)
IMM GRANULOCYTES NFR BLD AUTO: 0.5 % — SIGNIFICANT CHANGE UP (ref 0–0.9)
IMM GRANULOCYTES NFR BLD AUTO: 0.5 % — SIGNIFICANT CHANGE UP (ref 0–0.9)
LYMPHOCYTES # BLD AUTO: 1.61 K/UL — SIGNIFICANT CHANGE UP (ref 1–3.3)
LYMPHOCYTES # BLD AUTO: 1.61 K/UL — SIGNIFICANT CHANGE UP (ref 1–3.3)
LYMPHOCYTES # BLD AUTO: 12.5 % — LOW (ref 13–44)
LYMPHOCYTES # BLD AUTO: 12.5 % — LOW (ref 13–44)
MAGNESIUM SERPL-MCNC: 1.4 MG/DL — LOW (ref 1.6–2.6)
MAGNESIUM SERPL-MCNC: 1.4 MG/DL — LOW (ref 1.6–2.6)
MAGNESIUM SERPL-MCNC: 1.8 MG/DL — SIGNIFICANT CHANGE UP (ref 1.6–2.6)
MAGNESIUM SERPL-MCNC: 1.8 MG/DL — SIGNIFICANT CHANGE UP (ref 1.6–2.6)
MAGNESIUM SERPL-MCNC: 2.3 MG/DL — SIGNIFICANT CHANGE UP (ref 1.6–2.6)
MAGNESIUM SERPL-MCNC: 2.3 MG/DL — SIGNIFICANT CHANGE UP (ref 1.6–2.6)
MAGNESIUM SERPL-MCNC: 2.7 MG/DL — HIGH (ref 1.6–2.6)
MAGNESIUM SERPL-MCNC: 2.7 MG/DL — HIGH (ref 1.6–2.6)
MCHC RBC-ENTMCNC: 23.9 PG — LOW (ref 27–34)
MCHC RBC-ENTMCNC: 23.9 PG — LOW (ref 27–34)
MCHC RBC-ENTMCNC: 30.7 GM/DL — LOW (ref 32–36)
MCHC RBC-ENTMCNC: 30.7 GM/DL — LOW (ref 32–36)
MCV RBC AUTO: 77.8 FL — LOW (ref 80–100)
MCV RBC AUTO: 77.8 FL — LOW (ref 80–100)
MONOCYTES # BLD AUTO: 0.41 K/UL — SIGNIFICANT CHANGE UP (ref 0–0.9)
MONOCYTES # BLD AUTO: 0.41 K/UL — SIGNIFICANT CHANGE UP (ref 0–0.9)
MONOCYTES NFR BLD AUTO: 3.2 % — SIGNIFICANT CHANGE UP (ref 2–14)
MONOCYTES NFR BLD AUTO: 3.2 % — SIGNIFICANT CHANGE UP (ref 2–14)
NEUTROPHILS # BLD AUTO: 10.74 K/UL — HIGH (ref 1.8–7.4)
NEUTROPHILS # BLD AUTO: 10.74 K/UL — HIGH (ref 1.8–7.4)
NEUTROPHILS NFR BLD AUTO: 83.5 % — HIGH (ref 43–77)
NEUTROPHILS NFR BLD AUTO: 83.5 % — HIGH (ref 43–77)
NRBC # BLD: 0 /100 WBCS — SIGNIFICANT CHANGE UP (ref 0–0)
NRBC # BLD: 0 /100 WBCS — SIGNIFICANT CHANGE UP (ref 0–0)
PHOSPHATE SERPL-MCNC: 1.1 MG/DL — LOW (ref 2.5–4.5)
PHOSPHATE SERPL-MCNC: 1.1 MG/DL — LOW (ref 2.5–4.5)
PHOSPHATE SERPL-MCNC: 1.3 MG/DL — LOW (ref 2.5–4.5)
PHOSPHATE SERPL-MCNC: 1.3 MG/DL — LOW (ref 2.5–4.5)
PHOSPHATE SERPL-MCNC: 2.5 MG/DL — SIGNIFICANT CHANGE UP (ref 2.5–4.5)
PHOSPHATE SERPL-MCNC: 2.5 MG/DL — SIGNIFICANT CHANGE UP (ref 2.5–4.5)
PLATELET # BLD AUTO: 318 K/UL — SIGNIFICANT CHANGE UP (ref 150–400)
PLATELET # BLD AUTO: 318 K/UL — SIGNIFICANT CHANGE UP (ref 150–400)
POTASSIUM SERPL-MCNC: 4.2 MMOL/L — SIGNIFICANT CHANGE UP (ref 3.5–5.3)
POTASSIUM SERPL-MCNC: 4.2 MMOL/L — SIGNIFICANT CHANGE UP (ref 3.5–5.3)
POTASSIUM SERPL-MCNC: 4.3 MMOL/L — SIGNIFICANT CHANGE UP (ref 3.5–5.3)
POTASSIUM SERPL-MCNC: 4.3 MMOL/L — SIGNIFICANT CHANGE UP (ref 3.5–5.3)
POTASSIUM SERPL-MCNC: >10 MMOL/L — CRITICAL HIGH (ref 3.5–5.3)
POTASSIUM SERPL-MCNC: >10 MMOL/L — CRITICAL HIGH (ref 3.5–5.3)
POTASSIUM SERPL-SCNC: 4.2 MMOL/L — SIGNIFICANT CHANGE UP (ref 3.5–5.3)
POTASSIUM SERPL-SCNC: 4.2 MMOL/L — SIGNIFICANT CHANGE UP (ref 3.5–5.3)
POTASSIUM SERPL-SCNC: 4.3 MMOL/L — SIGNIFICANT CHANGE UP (ref 3.5–5.3)
POTASSIUM SERPL-SCNC: 4.3 MMOL/L — SIGNIFICANT CHANGE UP (ref 3.5–5.3)
POTASSIUM SERPL-SCNC: >10 MMOL/L — CRITICAL HIGH (ref 3.5–5.3)
POTASSIUM SERPL-SCNC: >10 MMOL/L — CRITICAL HIGH (ref 3.5–5.3)
PROT SERPL-MCNC: 7 G/DL — SIGNIFICANT CHANGE UP (ref 6–8.3)
PROT SERPL-MCNC: 7 G/DL — SIGNIFICANT CHANGE UP (ref 6–8.3)
RBC # BLD: 5.36 M/UL — HIGH (ref 3.8–5.2)
RBC # BLD: 5.36 M/UL — HIGH (ref 3.8–5.2)
RBC # FLD: 16.1 % — HIGH (ref 10.3–14.5)
RBC # FLD: 16.1 % — HIGH (ref 10.3–14.5)
SODIUM SERPL-SCNC: 130 MMOL/L — LOW (ref 135–145)
SODIUM SERPL-SCNC: 130 MMOL/L — LOW (ref 135–145)
SODIUM SERPL-SCNC: 131 MMOL/L — LOW (ref 135–145)
SODIUM SERPL-SCNC: 131 MMOL/L — LOW (ref 135–145)
SODIUM SERPL-SCNC: 132 MMOL/L — LOW (ref 135–145)
SODIUM SERPL-SCNC: 132 MMOL/L — LOW (ref 135–145)
WBC # BLD: 12.87 K/UL — HIGH (ref 3.8–10.5)
WBC # BLD: 12.87 K/UL — HIGH (ref 3.8–10.5)
WBC # FLD AUTO: 12.87 K/UL — HIGH (ref 3.8–10.5)
WBC # FLD AUTO: 12.87 K/UL — HIGH (ref 3.8–10.5)

## 2024-01-01 PROCEDURE — 36415 COLL VENOUS BLD VENIPUNCTURE: CPT

## 2024-01-01 PROCEDURE — 99254 IP/OBS CNSLTJ NEW/EST MOD 60: CPT

## 2024-01-01 PROCEDURE — 99255 IP/OBS CONSLTJ NEW/EST HI 80: CPT | Mod: GC

## 2024-01-01 PROCEDURE — 99233 SBSQ HOSP IP/OBS HIGH 50: CPT | Mod: GC

## 2024-01-01 PROCEDURE — 99223 1ST HOSP IP/OBS HIGH 75: CPT | Mod: GC

## 2024-01-01 PROCEDURE — 76937 US GUIDE VASCULAR ACCESS: CPT | Mod: 26

## 2024-01-01 PROCEDURE — 36000 PLACE NEEDLE IN VEIN: CPT

## 2024-01-01 RX ORDER — DEXTROSE 50 % IN WATER 50 %
25 SYRINGE (ML) INTRAVENOUS ONCE
Refills: 0 | Status: DISCONTINUED | OUTPATIENT
Start: 2024-01-01 | End: 2024-01-05

## 2024-01-01 RX ORDER — INSULIN LISPRO 100/ML
20 VIAL (ML) SUBCUTANEOUS
Refills: 0 | Status: DISCONTINUED | OUTPATIENT
Start: 2024-01-01 | End: 2024-01-01

## 2024-01-01 RX ORDER — INSULIN HUMAN 100 [IU]/ML
1 INJECTION, SOLUTION SUBCUTANEOUS
Qty: 50 | Refills: 0 | Status: DISCONTINUED | OUTPATIENT
Start: 2024-01-01 | End: 2024-01-01

## 2024-01-01 RX ORDER — METOPROLOL TARTRATE 50 MG
50 TABLET ORAL DAILY
Refills: 0 | Status: DISCONTINUED | OUTPATIENT
Start: 2024-01-01 | End: 2024-01-05

## 2024-01-01 RX ORDER — INSULIN LISPRO 100/ML
VIAL (ML) SUBCUTANEOUS ONCE
Refills: 0 | Status: COMPLETED | OUTPATIENT
Start: 2024-01-01 | End: 2024-01-01

## 2024-01-01 RX ORDER — INSULIN LISPRO 100/ML
35 VIAL (ML) SUBCUTANEOUS
Refills: 0 | Status: DISCONTINUED | OUTPATIENT
Start: 2024-01-01 | End: 2024-01-02

## 2024-01-01 RX ORDER — HUMAN INSULIN 100 [IU]/ML
65 INJECTION, SUSPENSION SUBCUTANEOUS
Refills: 0 | Status: DISCONTINUED | OUTPATIENT
Start: 2024-01-01 | End: 2024-01-02

## 2024-01-01 RX ORDER — SODIUM CHLORIDE 9 MG/ML
1000 INJECTION, SOLUTION INTRAVENOUS
Refills: 0 | Status: DISCONTINUED | OUTPATIENT
Start: 2024-01-01 | End: 2024-01-05

## 2024-01-01 RX ORDER — INSULIN LISPRO 100/ML
VIAL (ML) SUBCUTANEOUS
Refills: 0 | Status: DISCONTINUED | OUTPATIENT
Start: 2024-01-01 | End: 2024-01-01

## 2024-01-01 RX ORDER — OXYCODONE HYDROCHLORIDE 5 MG/1
10 TABLET ORAL EVERY 4 HOURS
Refills: 0 | Status: DISCONTINUED | OUTPATIENT
Start: 2024-01-01 | End: 2024-01-05

## 2024-01-01 RX ORDER — LINEZOLID 600 MG/300ML
INJECTION, SOLUTION INTRAVENOUS
Refills: 0 | Status: DISCONTINUED | OUTPATIENT
Start: 2024-01-01 | End: 2024-01-03

## 2024-01-01 RX ORDER — INSULIN HUMAN 100 [IU]/ML
4 INJECTION, SOLUTION SUBCUTANEOUS
Qty: 100 | Refills: 0 | Status: DISCONTINUED | OUTPATIENT
Start: 2024-01-01 | End: 2024-01-01

## 2024-01-01 RX ORDER — ONDANSETRON 8 MG/1
4 TABLET, FILM COATED ORAL ONCE
Refills: 0 | Status: COMPLETED | OUTPATIENT
Start: 2024-01-01 | End: 2024-01-01

## 2024-01-01 RX ORDER — INSULIN HUMAN 100 [IU]/ML
16 INJECTION, SOLUTION SUBCUTANEOUS
Qty: 100 | Refills: 0 | Status: DISCONTINUED | OUTPATIENT
Start: 2024-01-01 | End: 2024-01-01

## 2024-01-01 RX ORDER — DEXTROSE 50 % IN WATER 50 %
12.5 SYRINGE (ML) INTRAVENOUS ONCE
Refills: 0 | Status: DISCONTINUED | OUTPATIENT
Start: 2024-01-01 | End: 2024-01-01

## 2024-01-01 RX ORDER — INSULIN LISPRO 100/ML
VIAL (ML) SUBCUTANEOUS
Refills: 0 | Status: DISCONTINUED | OUTPATIENT
Start: 2024-01-01 | End: 2024-01-05

## 2024-01-01 RX ORDER — INSULIN HUMAN 100 [IU]/ML
12 INJECTION, SOLUTION SUBCUTANEOUS
Qty: 100 | Refills: 0 | Status: DISCONTINUED | OUTPATIENT
Start: 2024-01-01 | End: 2024-01-01

## 2024-01-01 RX ORDER — LINEZOLID 600 MG/300ML
600 INJECTION, SOLUTION INTRAVENOUS ONCE
Refills: 0 | Status: COMPLETED | OUTPATIENT
Start: 2024-01-01 | End: 2024-01-01

## 2024-01-01 RX ORDER — INSULIN GLARGINE 100 [IU]/ML
50 INJECTION, SOLUTION SUBCUTANEOUS EVERY MORNING
Refills: 0 | Status: DISCONTINUED | OUTPATIENT
Start: 2024-01-01 | End: 2024-01-01

## 2024-01-01 RX ORDER — POTASSIUM CHLORIDE 20 MEQ
40 PACKET (EA) ORAL EVERY 4 HOURS
Refills: 0 | Status: COMPLETED | OUTPATIENT
Start: 2024-01-01 | End: 2024-01-01

## 2024-01-01 RX ORDER — DEXTROSE 50 % IN WATER 50 %
12.5 SYRINGE (ML) INTRAVENOUS ONCE
Refills: 0 | Status: DISCONTINUED | OUTPATIENT
Start: 2024-01-01 | End: 2024-01-05

## 2024-01-01 RX ORDER — PANTOPRAZOLE SODIUM 20 MG/1
40 TABLET, DELAYED RELEASE ORAL EVERY 24 HOURS
Refills: 0 | Status: DISCONTINUED | OUTPATIENT
Start: 2024-01-01 | End: 2024-01-05

## 2024-01-01 RX ORDER — GLUCAGON INJECTION, SOLUTION 0.5 MG/.1ML
1 INJECTION, SOLUTION SUBCUTANEOUS ONCE
Refills: 0 | Status: DISCONTINUED | OUTPATIENT
Start: 2024-01-01 | End: 2024-01-05

## 2024-01-01 RX ORDER — DEXTROSE 50 % IN WATER 50 %
15 SYRINGE (ML) INTRAVENOUS ONCE
Refills: 0 | Status: DISCONTINUED | OUTPATIENT
Start: 2024-01-01 | End: 2024-01-05

## 2024-01-01 RX ORDER — CLOPIDOGREL BISULFATE 75 MG/1
75 TABLET, FILM COATED ORAL DAILY
Refills: 0 | Status: DISCONTINUED | OUTPATIENT
Start: 2024-01-01 | End: 2024-01-05

## 2024-01-01 RX ORDER — SODIUM CHLORIDE 9 MG/ML
1000 INJECTION, SOLUTION INTRAVENOUS
Refills: 0 | Status: DISCONTINUED | OUTPATIENT
Start: 2024-01-01 | End: 2024-01-01

## 2024-01-01 RX ORDER — ERTAPENEM SODIUM 1 G/1
1000 INJECTION, POWDER, LYOPHILIZED, FOR SOLUTION INTRAMUSCULAR; INTRAVENOUS EVERY 24 HOURS
Refills: 0 | Status: DISCONTINUED | OUTPATIENT
Start: 2024-01-01 | End: 2024-01-03

## 2024-01-01 RX ORDER — LINEZOLID 600 MG/300ML
600 INJECTION, SOLUTION INTRAVENOUS EVERY 12 HOURS
Refills: 0 | Status: DISCONTINUED | OUTPATIENT
Start: 2024-01-02 | End: 2024-01-03

## 2024-01-01 RX ADMIN — Medication 40 MILLIEQUIVALENT(S): at 05:29

## 2024-01-01 RX ADMIN — SODIUM CHLORIDE 200 MILLILITER(S): 9 INJECTION, SOLUTION INTRAVENOUS at 07:29

## 2024-01-01 RX ADMIN — OXYCODONE HYDROCHLORIDE 10 MILLIGRAM(S): 5 TABLET ORAL at 06:10

## 2024-01-01 RX ADMIN — OXYCODONE HYDROCHLORIDE 10 MILLIGRAM(S): 5 TABLET ORAL at 01:14

## 2024-01-01 RX ADMIN — Medication 2: at 10:37

## 2024-01-01 RX ADMIN — Medication 1 APPLICATION(S): at 18:19

## 2024-01-01 RX ADMIN — HUMAN INSULIN 65 UNIT(S): 100 INJECTION, SUSPENSION SUBCUTANEOUS at 17:23

## 2024-01-01 RX ADMIN — Medication 1 APPLICATION(S): at 05:28

## 2024-01-01 RX ADMIN — Medication 100 MILLIEQUIVALENT(S): at 02:18

## 2024-01-01 RX ADMIN — SODIUM CHLORIDE 150 MILLILITER(S): 9 INJECTION, SOLUTION INTRAVENOUS at 00:45

## 2024-01-01 RX ADMIN — APIXABAN 5 MILLIGRAM(S): 2.5 TABLET, FILM COATED ORAL at 05:28

## 2024-01-01 RX ADMIN — INSULIN HUMAN 4 UNIT(S)/HR: 100 INJECTION, SOLUTION SUBCUTANEOUS at 00:45

## 2024-01-01 RX ADMIN — INSULIN HUMAN 4 UNIT(S)/HR: 100 INJECTION, SOLUTION SUBCUTANEOUS at 04:53

## 2024-01-01 RX ADMIN — Medication 100 MILLIEQUIVALENT(S): at 00:07

## 2024-01-01 RX ADMIN — Medication 85 MILLIMOLE(S): at 12:24

## 2024-01-01 RX ADMIN — OXYCODONE HYDROCHLORIDE 10 MILLIGRAM(S): 5 TABLET ORAL at 00:54

## 2024-01-01 RX ADMIN — Medication 12: at 14:45

## 2024-01-01 RX ADMIN — OXYCODONE HYDROCHLORIDE 10 MILLIGRAM(S): 5 TABLET ORAL at 05:28

## 2024-01-01 RX ADMIN — ATORVASTATIN CALCIUM 80 MILLIGRAM(S): 80 TABLET, FILM COATED ORAL at 21:34

## 2024-01-01 RX ADMIN — Medication 12: at 16:05

## 2024-01-01 RX ADMIN — LINEZOLID 300 MILLIGRAM(S): 600 INJECTION, SOLUTION INTRAVENOUS at 19:12

## 2024-01-01 RX ADMIN — Medication 20 UNIT(S): at 10:38

## 2024-01-01 RX ADMIN — Medication 50 MILLIGRAM(S): at 05:29

## 2024-01-01 RX ADMIN — Medication 40 MILLIEQUIVALENT(S): at 00:54

## 2024-01-01 RX ADMIN — CLOPIDOGREL BISULFATE 75 MILLIGRAM(S): 75 TABLET, FILM COATED ORAL at 12:24

## 2024-01-01 RX ADMIN — LINEZOLID 600 MILLIGRAM(S): 600 INJECTION, SOLUTION INTRAVENOUS at 00:55

## 2024-01-01 RX ADMIN — Medication 35 UNIT(S): at 17:10

## 2024-01-01 RX ADMIN — OXYCODONE HYDROCHLORIDE 10 MILLIGRAM(S): 5 TABLET ORAL at 13:43

## 2024-01-01 RX ADMIN — INSULIN GLARGINE 50 UNIT(S): 100 INJECTION, SOLUTION SUBCUTANEOUS at 10:39

## 2024-01-01 RX ADMIN — OXYCODONE HYDROCHLORIDE 10 MILLIGRAM(S): 5 TABLET ORAL at 19:12

## 2024-01-01 RX ADMIN — ERTAPENEM SODIUM 120 MILLIGRAM(S): 1 INJECTION, POWDER, LYOPHILIZED, FOR SOLUTION INTRAMUSCULAR; INTRAVENOUS at 17:35

## 2024-01-01 RX ADMIN — OXYCODONE HYDROCHLORIDE 10 MILLIGRAM(S): 5 TABLET ORAL at 12:47

## 2024-01-01 RX ADMIN — ONDANSETRON 4 MILLIGRAM(S): 8 TABLET, FILM COATED ORAL at 11:59

## 2024-01-01 RX ADMIN — CHLORHEXIDINE GLUCONATE 1 APPLICATION(S): 213 SOLUTION TOPICAL at 06:10

## 2024-01-01 RX ADMIN — APIXABAN 5 MILLIGRAM(S): 2.5 TABLET, FILM COATED ORAL at 17:36

## 2024-01-01 RX ADMIN — Medication 12: at 17:10

## 2024-01-01 RX ADMIN — PANTOPRAZOLE SODIUM 40 MILLIGRAM(S): 20 TABLET, DELAYED RELEASE ORAL at 09:18

## 2024-01-01 RX ADMIN — Medication 40 MILLIEQUIVALENT(S): at 09:18

## 2024-01-01 RX ADMIN — Medication 100 MILLIEQUIVALENT(S): at 04:54

## 2024-01-01 RX ADMIN — OXYCODONE HYDROCHLORIDE 10 MILLIGRAM(S): 5 TABLET ORAL at 20:12

## 2024-01-01 NOTE — PROGRESS NOTE ADULT - SUBJECTIVE AND OBJECTIVE BOX
SUBJECTIVE: Patient seen at bedside. Patient reports she is doing okay.       MEDICATIONS  (STANDING):  apixaban 5 milliGRAM(s) Oral every 12 hours  atorvastatin 80 milliGRAM(s) Oral at bedtime  chlorhexidine 2% Cloths 1 Application(s) Topical <User Schedule>  clopidogrel Tablet 75 milliGRAM(s) Oral daily  clotrimazole 1% Cream 1 Application(s) Topical two times a day  dextrose 5%. 1000 milliLiter(s) (50 mL/Hr) IV Continuous <Continuous>  dextrose 5%. 1000 milliLiter(s) (100 mL/Hr) IV Continuous <Continuous>  dextrose 50% Injectable 25 Gram(s) IV Push once  dextrose 50% Injectable 25 Gram(s) IV Push once  dextrose 50% Injectable 12.5 Gram(s) IV Push once  glucagon  Injectable 1 milliGRAM(s) IntraMuscular once  insulin glargine Injectable (LANTUS) 50 Unit(s) SubCutaneous every morning  insulin lispro (ADMELOG) corrective regimen sliding scale   SubCutaneous three times a day before meals  insulin lispro Injectable (ADMELOG) 20 Unit(s) SubCutaneous three times a day before meals  insulin regular Infusion 1 Unit(s)/Hr (1 mL/Hr) IV Continuous <Continuous>  metoprolol succinate ER 50 milliGRAM(s) Oral daily  pantoprazole    Tablet 40 milliGRAM(s) Oral every 24 hours  sodium chloride 0.9% 1000 milliLiter(s) (200 mL/Hr) IV Continuous <Continuous>    MEDICATIONS  (PRN):  dextrose Oral Gel 15 Gram(s) Oral once PRN Blood Glucose LESS THAN 70 milliGRAM(s)/deciliter  oxyCODONE    IR 10 milliGRAM(s) Oral every 4 hours PRN Severe Pain (7 - 10)      Vital Signs Last 24 Hrs  T(C): 35.4 (2024 09:44), Max: 36.8 (31 Dec 2023 22:34)  T(F): 95.8 (2024 09:44), Max: 98.3 (31 Dec 2023 22:34)  HR: 99 (2024 12:00) (67 - 106)  BP: 158/67 (2024 12:00) (127/91 - 193/95)  BP(mean): 97 (2024 12:00) (91 - 136)  RR: 21 (2024 12:00) (18 - 39)  SpO2: 99% (2024 12:00) (95% - 117%)    Parameters below as of 2024 12:00  Patient On (Oxygen Delivery Method): room air        Physical Exam:  General: NAD, resting comfortably in bed  Pulmonary: Nonlabored breathing, no respiratory distress  Cardiovascular: NSR  Abdominal: soft, NT/ND pannus where old  scar with edema and tender, extending to around the ostomy, right groin with red beefy irritated and bloody skin extended into vagina, no crepitus, fluctuance or drainage noted, left groin with mild red beefy irritation compared to right  Extremities: WWP, normal strength  Neuro: A/O x 3, CNs II-XII grossly intact,     I&O's Summary    31 Dec 2023 07:01  -  2024 07:00  --------------------------------------------------------  IN: 1538 mL / OUT: 2600 mL / NET: -1062 mL    2024 07:01  -  2024 13:30  --------------------------------------------------------  IN: 200 mL / OUT: 0 mL / NET: 200 mL        LABS:                        12.8   12.87 )-----------( 318      ( 2024 04:36 )             41.7         130<L>  |  96  |  10  ----------------------------<  420<H>  4.2   |  16<L>  |  0.61    Ca    8.3<L>      2024 04:33  Phos  1.3       Mg     2.3         TPro  7.7  /  Alb  3.5  /  TBili  0.3  /  DBili  0.2  /  AST  18  /  ALT  15  /  AlkPhos  187<H>  12-31    PT/INR - ( 31 Dec 2023 12:46 )   PT: 11.5 sec;   INR: 1.01          PTT - ( 31 Dec 2023 12:46 )  PTT:28.8 sec  Urinalysis Basic - ( 2024 04:33 )    Color: x / Appearance: x / SG: x / pH: x  Gluc: 420 mg/dL / Ketone: x  / Bili: x / Urobili: x   Blood: x / Protein: x / Nitrite: x   Leuk Esterase: x / RBC: x / WBC x   Sq Epi: x / Non Sq Epi: x / Bacteria: x      CAPILLARY BLOOD GLUCOSE      POCT Blood Glucose.: 310 mg/dL (2024 12:47)  POCT Blood Glucose.: 239 mg/dL (2024 11:35)  POCT Blood Glucose.: 179 mg/dL (2024 10:31)  POCT Blood Glucose.: 160 mg/dL (2024 09:28)  POCT Blood Glucose.: 142 mg/dL (2024 08:30)  POCT Blood Glucose.: 138 mg/dL (2024 07:26)  POCT Blood Glucose.: 213 mg/dL (2024 06:18)  POCT Blood Glucose.: 268 mg/dL (2024 05:32)  POCT Blood Glucose.: 404 mg/dL (2024 04:36)  POCT Blood Glucose.: 453 mg/dL (2024 03:35)  POCT Blood Glucose.: 558 mg/dL (2024 02:28)  POCT Blood Glucose.: >600 mg/dL (2024 01:35)  POCT Blood Glucose.: >600 mg/dL (2024 00:46)  POCT Blood Glucose.: >600 mg/dL (31 Dec 2023 23:09)  POCT Blood Glucose.: >600 mg/dL (31 Dec 2023 22:07)  POCT Blood Glucose.: >600 mg/dL (31 Dec 2023 21:18)  POCT Blood Glucose.: >600 mg/dL (31 Dec 2023 20:41)  POCT Blood Glucose.: >600 mg/dL (31 Dec 2023 20:02)    LIVER FUNCTIONS - ( 31 Dec 2023 12:46 )  Alb: 3.5 g/dL / Pro: 7.7 g/dL / ALK PHOS: 187 U/L / ALT: 15 U/L / AST: 18 U/L / GGT: x             RADIOLOGY & ADDITIONAL STUDIES:

## 2024-01-01 NOTE — H&P ADULT - NSHPLABSRESULTS_GEN_ALL_CORE
13.1   9.94  )-----------( 386      ( 31 Dec 2023 12:46 )             40.8       12-31    128<L>  |  93<L>  |  12  ----------------------------<  697<HH>  3.5   |  18<L>  |  0.62    Ca    8.2<L>      31 Dec 2023 22:41  Mg     1.7     12-31    TPro  7.7  /  Alb  3.5  /  TBili  0.3  /  DBili  0.2  /  AST  18  /  ALT  15  /  AlkPhos  187<H>  12-31    Urinalysis Basic - ( 31 Dec 2023 22:41 )    Color: x / Appearance: x / SG: x / pH: x  Gluc: 697 mg/dL / Ketone: x  / Bili: x / Urobili: x   Blood: x / Protein: x / Nitrite: x   Leuk Esterase: x / RBC: x / WBC x   Sq Epi: x / Non Sq Epi: x / Bacteria: x    < from: CT Lower Extremity w/ IV Cont, Bilateral (12.31.23 @ 18:43) >  IMPRESSION:  *  No fluid collection or evidence of necrotizing fasciitis involving the   left groin or left mid to upper thigh.  *  A 3.4 cm heterogeneous left adnexal lesion, possibly a hemorrhagic   cyst. Nonemergent follow-up pelvic ultrasound recommended in 2-3 months.    < from: CT Angio Chest PE Protocol w/ IV Cont (12.31.23 @ 18:42) >  IMPRESSION:  No visualized PE.

## 2024-01-01 NOTE — H&P ADULT - HISTORY OF PRESENT ILLNESS
43F PMH Asthma, CVA (11/2021; residual L>R weakness), PE (4/2023 on Eliquis), uncontrolled DM-2 (on insulin), HTN, HLD, hx abdominal necrotizing fasciitis (ostomy placement in 11/2021 with intubation from 11-12/2021),multiple abd hernia repairs, hx of frequent UTIs 2/2 to suprapubic catheter (removed in June 2023), most recent UTI c/b klebsiella and e coli urosepsis s/p ertapenem course, PCI s/p NAT 7/2023 and 8/2023, recent 8/2023 [facility] admission for L labial abscess now presenting to ED with L groin swelling that has been progressively increasing over the the past 3 weeks, now with worsening discomfort at site however without any discharge or drainage. Pt is also complaining of intermittent nonpleuritic chest pain, lightheadedness, and SOB occasionally at rest and with exertion x3 weeks. She also reports intermittent nausea/vomiting, and abdominal discomfort over the last several weeks. Pt denies fevers, chills, night sweats at home.     ED COURSE  VS T 98.3  /91 SpO2 100% on RA  LABS ESR 42 WBC 9.94 Hgb 13.1 ddimer 363 Na 125 K 2.8 Cl 83 AG 20 Glc 654 Lactate 6.9 CRP 14.8  VBG pH 7.44  UA SG >1.030 WBC 24 Bacteria Moderate Small LE, +yeast like cells, +WBC clumps, >1000 glc  IMAGING CT LE w/o nec fasc or fluid collection, CTAP +hepatic steatosis, +L adnexal lesion, unchanged adrenal nodule CTA w/o PE  INTERVENTIONS Benadyl 50mg IVP, ertapenem 1g, dilaudid 1mg x2, Insulin regular 10u, Mg 2g, solumedrol 125mg, zofran 4mg IVP, KCl 40 x2, KCl 10mEq x3, NS 1000cc x3  CONSULTS Surgery, ICU 43F PMH Asthma, CVA (11/2021; residual L>R weakness), PE (4/2023 on Eliquis), uncontrolled DM-2 (on insulin), HTN, HLD, hx abdominal necrotizing fasciitis (ostomy placement in 11/2021 with intubation from 11-12/2021),multiple abd hernia repairs, hx of frequent UTIs 2/2 to suprapubic catheter (removed in June 2023), most recent UTI c/b klebsiella and e coli urosepsis s/p ertapenem course, PCI s/p NAT 7/2023 and 8/2023, recent 8/2023 [facility] admission for L labial abscess now presenting to ED with L groin swelling that has been progressively increasing over the the past 3 weeks, now with worsening discomfort at site however without any discharge or drainage. Pt is also complaining of intermittent nonpleuritic chest pain, lightheadedness, and SOB occasionally at rest and with exertion x3 weeks. She also reports intermittent nausea/vomiting, and abdominal discomfort over the last several weeks. Pt denies fevers, chills, night sweats at home.     ED COURSE  VS T 98.3  /91 SpO2 100% on RA  LABS ESR 42 WBC 9.94 Hgb 13.1 ddimer 363 Na 125 K 2.8 Cl 83 AG 20 Glc 654 Lactate 6.9 CRP 14.8  VBG pH 7.44  UA SG >1.030 WBC 24 Bacteria Moderate Small LE, +yeast like cells, +WBC clumps, >1000 glc  IMAGING CT LE w/o nec fasc or fluid collection, CTAP +hepatic steatosis, +L adnexal lesion, unchanged adrenal nodule CTA w/o PE  INTERVENTIONS Benadyl 50mg IVP, ertapenem 1g, dilaudid 1mg x2, Insulin regular 10u, Mg 2g, solumedrol 125mg, zofran 4mg IVP, KCl 40 x2, KCl 10mEq x3, NS 1000cc x3  CONSULTS Surgery, ICU

## 2024-01-01 NOTE — PROGRESS NOTE ADULT - SUBJECTIVE AND OBJECTIVE BOX
OVERNIGHT EVENTS: ordered 40 KCl tabs x3, started insulin gtt at 4U per hyperglycemia protocol, AG 18 on AM labs, increased IVF to 200cc/hr, insulin gtt fixed at 16, ordered lactate, BG <250, decreased insulin gtt to 12    SUBJECTIVE/INTERVAL HPI: Patient was seen and examined at bedside. Patient reports her groin pain and nausea have improved. However, reports bilateral temporal headache that began this morning. Denies any dizziness, SOB, chest pain, palpitations, vomiting, diarrhea, dysuria.     VITAL SIGNS:  T(F): 95.8 (01-01-24 @ 09:44)  HR: 99 (01-01-24 @ 13:00)  BP: 119/71 (01-01-24 @ 13:00)  RR: 20 (01-01-24 @ 13:00)  SpO2: 99% (01-01-24 @ 13:00)  Wt(kg): --      12-31-23 @ 07:01  -  01-01-24 @ 07:00  --------------------------------------------------------  IN: 1538 mL / OUT: 2600 mL / NET: -1062 mL    01-01-24 @ 07:01  -  01-01-24 @ 14:04  --------------------------------------------------------  IN: 1049.9 mL / OUT: 300 mL / NET: 749.9 mL        PHYSICAL EXAM:  General: NAD, comfortable   Head: PERRL, dry MM  Neck: Supple  Respiratory: CTAB; no wheezes  Cardiovascular: RRR, S1/S2, no murmurs  Gastrointestinal: Soft, nondistended, +parastomal hernia, +ostomy bag, +tenderness and erythema around ostomy site, +erythema in L abdomen and b/l groin w/ desquamation of skin, no crepitus, fluctuance or drainage noted  Extremities: WWP; no edema/cyanosis  Neurological: A&Ox3, CNII-XII grossly intact; no obvious focal deficits    MEDICATIONS  (STANDING):  apixaban 5 milliGRAM(s) Oral every 12 hours  atorvastatin 80 milliGRAM(s) Oral at bedtime  chlorhexidine 2% Cloths 1 Application(s) Topical <User Schedule>  clopidogrel Tablet 75 milliGRAM(s) Oral daily  clotrimazole 1% Cream 1 Application(s) Topical two times a day  dextrose 5%. 1000 milliLiter(s) (50 mL/Hr) IV Continuous <Continuous>  dextrose 5%. 1000 milliLiter(s) (100 mL/Hr) IV Continuous <Continuous>  dextrose 50% Injectable 25 Gram(s) IV Push once  dextrose 50% Injectable 12.5 Gram(s) IV Push once  dextrose 50% Injectable 25 Gram(s) IV Push once  glucagon  Injectable 1 milliGRAM(s) IntraMuscular once  insulin glargine Injectable (LANTUS) 50 Unit(s) SubCutaneous every morning  insulin lispro (ADMELOG) corrective regimen sliding scale   SubCutaneous three times a day before meals  insulin lispro Injectable (ADMELOG) 20 Unit(s) SubCutaneous three times a day before meals  insulin regular Infusion 1 Unit(s)/Hr (1 mL/Hr) IV Continuous <Continuous>  metoprolol succinate ER 50 milliGRAM(s) Oral daily  pantoprazole    Tablet 40 milliGRAM(s) Oral every 24 hours  sodium chloride 0.9% 1000 milliLiter(s) (200 mL/Hr) IV Continuous <Continuous>    MEDICATIONS  (PRN):  dextrose Oral Gel 15 Gram(s) Oral once PRN Blood Glucose LESS THAN 70 milliGRAM(s)/deciliter  oxyCODONE    IR 10 milliGRAM(s) Oral every 4 hours PRN Severe Pain (7 - 10)      Allergies    iodine containing compounds (Hives; Anaphylaxis)  clindamycin (Pruritus)  fish (Hives; Urticaria)  vancomycin (Anaphylaxis; Hives)  penicillin (Hives)  shellfish. (Hives; Anaphylaxis)  amoxicillin (Short breath; Rash)    Intolerances    Bactrim I.V. (Rash (Mod to Severe))      LABS:                        12.8   12.87 )-----------( 318      ( 01 Jan 2024 04:36 )             41.7     01-01    130<L>  |  96  |  10  ----------------------------<  420<H>  4.2   |  16<L>  |  0.61    Ca    8.3<L>      01 Jan 2024 04:33  Phos  1.3     01-01  Mg     2.3     01-01    TPro  7.7  /  Alb  3.5  /  TBili  0.3  /  DBili  0.2  /  AST  18  /  ALT  15  /  AlkPhos  187<H>  12-31    PT/INR - ( 31 Dec 2023 12:46 )   PT: 11.5 sec;   INR: 1.01          PTT - ( 31 Dec 2023 12:46 )  PTT:28.8 sec  Urinalysis Basic - ( 01 Jan 2024 04:33 )    Color: x / Appearance: x / SG: x / pH: x  Gluc: 420 mg/dL / Ketone: x  / Bili: x / Urobili: x   Blood: x / Protein: x / Nitrite: x   Leuk Esterase: x / RBC: x / WBC x   Sq Epi: x / Non Sq Epi: x / Bacteria: x        RADIOLOGY & ADDITIONAL TESTS:  Reviewed OVERNIGHT EVENTS: ordered 40 KCl tabs x3, started insulin gtt at 4U per hyperglycemia protocol, AG 18 on AM labs, increased IVF to 200cc/hr, insulin gtt fixed at 16, ordered lactate, BG <250, decreased insulin gtt to 12    SUBJECTIVE/INTERVAL HPI: Patient was seen and examined at bedside. Patient reports her groin pain and nausea have improved. However, reports bilateral temporal headache that began this morning. Denies any dizziness, SOB, chest pain, palpitations, vomiting, diarrhea.     VITAL SIGNS:  T(F): 95.8 (01-01-24 @ 09:44)  HR: 99 (01-01-24 @ 13:00)  BP: 119/71 (01-01-24 @ 13:00)  RR: 20 (01-01-24 @ 13:00)  SpO2: 99% (01-01-24 @ 13:00)  Wt(kg): --      12-31-23 @ 07:01  -  01-01-24 @ 07:00  --------------------------------------------------------  IN: 1538 mL / OUT: 2600 mL / NET: -1062 mL    01-01-24 @ 07:01  -  01-01-24 @ 14:04  --------------------------------------------------------  IN: 1049.9 mL / OUT: 300 mL / NET: 749.9 mL        PHYSICAL EXAM:  General: NAD, comfortable   Head: PERRL, dry MM  Neck: Supple  Respiratory: CTAB; no wheezes  Cardiovascular: RRR, S1/S2, no murmurs  Gastrointestinal: Soft, nondistended, +parastomal hernia, +ostomy bag, mild RLQ tenderness, b/l groin w/ desquamation of skin with erythema to L inguinal region, no crepitus, fluctuance or drainage noted  Extremities: WWP; no LE edema  Neurological: A&Ox3, no obvious focal deficits      MEDICATIONS  (STANDING):  apixaban 5 milliGRAM(s) Oral every 12 hours  atorvastatin 80 milliGRAM(s) Oral at bedtime  chlorhexidine 2% Cloths 1 Application(s) Topical <User Schedule>  clopidogrel Tablet 75 milliGRAM(s) Oral daily  clotrimazole 1% Cream 1 Application(s) Topical two times a day  dextrose 5%. 1000 milliLiter(s) (50 mL/Hr) IV Continuous <Continuous>  dextrose 5%. 1000 milliLiter(s) (100 mL/Hr) IV Continuous <Continuous>  dextrose 50% Injectable 25 Gram(s) IV Push once  dextrose 50% Injectable 12.5 Gram(s) IV Push once  dextrose 50% Injectable 25 Gram(s) IV Push once  glucagon  Injectable 1 milliGRAM(s) IntraMuscular once  insulin glargine Injectable (LANTUS) 50 Unit(s) SubCutaneous every morning  insulin lispro (ADMELOG) corrective regimen sliding scale   SubCutaneous three times a day before meals  insulin lispro Injectable (ADMELOG) 20 Unit(s) SubCutaneous three times a day before meals  insulin regular Infusion 1 Unit(s)/Hr (1 mL/Hr) IV Continuous <Continuous>  metoprolol succinate ER 50 milliGRAM(s) Oral daily  pantoprazole    Tablet 40 milliGRAM(s) Oral every 24 hours  sodium chloride 0.9% 1000 milliLiter(s) (200 mL/Hr) IV Continuous <Continuous>    MEDICATIONS  (PRN):  dextrose Oral Gel 15 Gram(s) Oral once PRN Blood Glucose LESS THAN 70 milliGRAM(s)/deciliter  oxyCODONE    IR 10 milliGRAM(s) Oral every 4 hours PRN Severe Pain (7 - 10)      Allergies    iodine containing compounds (Hives; Anaphylaxis)  clindamycin (Pruritus)  fish (Hives; Urticaria)  vancomycin (Anaphylaxis; Hives)  penicillin (Hives)  shellfish. (Hives; Anaphylaxis)  amoxicillin (Short breath; Rash)    Intolerances    Bactrim I.V. (Rash (Mod to Severe))      LABS:                        12.8   12.87 )-----------( 318      ( 01 Jan 2024 04:36 )             41.7     01-01    130<L>  |  96  |  10  ----------------------------<  420<H>  4.2   |  16<L>  |  0.61    Ca    8.3<L>      01 Jan 2024 04:33  Phos  1.3     01-01  Mg     2.3     01-01    TPro  7.7  /  Alb  3.5  /  TBili  0.3  /  DBili  0.2  /  AST  18  /  ALT  15  /  AlkPhos  187<H>  12-31    PT/INR - ( 31 Dec 2023 12:46 )   PT: 11.5 sec;   INR: 1.01          PTT - ( 31 Dec 2023 12:46 )  PTT:28.8 sec  Urinalysis Basic - ( 01 Jan 2024 04:33 )    Color: x / Appearance: x / SG: x / pH: x  Gluc: 420 mg/dL / Ketone: x  / Bili: x / Urobili: x   Blood: x / Protein: x / Nitrite: x   Leuk Esterase: x / RBC: x / WBC x   Sq Epi: x / Non Sq Epi: x / Bacteria: x        RADIOLOGY & ADDITIONAL TESTS:  Reviewed

## 2024-01-01 NOTE — PROGRESS NOTE ADULT - ASSESSMENT
43F w/ PMHx Asthma, CVA (11/2021; residual L>R weakness), PE (4/2023 on Eliquis, last taken 12/29), T2DM (on insulin), HTN, HLD with hx abdominal necrotizing fasciitis s/p diverting colostomy creation (11/2021; [facility]), CAD (on , 2 NAT in 6/23) with PSHx of numerous abdominal wall hernia repairs who presents to [facility] for a constilation of medical compliants. Surgery consulted for evaluation of abdominal wall and groin erythema to rule out necrtoizing fasciitis and parastomal hernia.   No crepitus, flutuance or erythema appreciated on exam.   CTAP without evidence of drainable collection or necrotizing fasciitis    Recommendations:  - No acute surgical intervention indicated at this time  - Continue strict blood glucose control   - Consult ID  - Team 4C will continue to follow. Please call with any questions/concerns. 43F w/ PMHx Asthma, CVA (11/2021; residual L>R weakness), PE (4/2023 on Eliquis, last taken 12/29), T2DM (on insulin), HTN, HLD with hx abdominal necrotizing fasciitis s/p diverting colostomy creation (11/2021; [facility]), CAD (on , 2 NAT in 6/23) with PSHx of numerous abdominal wall hernia repairs who presents to [facility] for a constilation of medical compliants. Surgery consulted for evaluation of abdominal wall and groin erythema to rule out necrtoizing fasciitis and parastomal hernia.   No crepitus, flutuance or erythema appreciated on exam.   CTAP without evidence of drainable collection or necrotizing fasciitis    Recommendations:  - No acute surgical intervention indicated at this time  - Continue strict blood glucose control   - Consult ID  - Team 4C will continue to follow. Please call with any questions/concerns. 43F w/ PMHx Asthma, CVA (11/2021; residual L>R weakness), PE (4/2023 on Eliquis, last taken 12/29), T2DM (on insulin), HTN, HLD with hx abdominal necrotizing fasciitis s/p diverting colostomy creation (11/2021; [facility]), CAD (on , 2 NAT in 6/23) with PSHx of numerous abdominal wall hernia repairs who presents to [facility] for a constilation of medical compliants. Surgery consulted for evaluation of abdominal wall and groin erythema to rule out necrtoizing fasciitis and parastomal hernia.   No crepitus, flutuance or erythema appreciated on exam.   CTAP without evidence of drainable collection or necrotizing fasciitis    Recommendations:  - No acute surgical intervention indicated at this time  - Continue strict blood glucose control   - Consult ID  - Team 4C will sign off. Please re-consult with any new questions/concerns. 43F w/ PMHx Asthma, CVA (11/2021; residual L>R weakness), PE (4/2023 on Eliquis, last taken 12/29), T2DM (on insulin), HTN, HLD with hx abdominal necrotizing fasciitis s/p diverting colostomy creation (11/2021; [facility]), CAD (on , 2 NAT in 6/23) with PSHx of numerous abdominal wall hernia repairs who presents to [facility] for a constilation of medical compliants. Surgery consulted for evaluation of abdominal wall and groin erythema to rule out necrtoizing fasciitis and parastomal hernia.   No crepitus, flutuance or erythema appreciated on exam.   CTAP without evidence of drainable collection or necrotizing fasciitis    Recommendations:  - No acute surgical intervention indicated at this time  - Continue strict blood glucose control   - Consult ID  - Team 4C will sign off. Please re-consult with any new questions/concerns.

## 2024-01-01 NOTE — H&P ADULT - NSHPPHYSICALEXAM_GEN_ALL_CORE
PHYSICAL EXAM  General: NAD  Head: pupils reactive  Neck: Supple; no JVD  Respiratory: CTAB; no wheezes/rales/rhonchi  Cardiovascular: +tachy rate, regular rhythm, S1/S2+, no murmurs, rubs gallops   Gastrointestinal: Soft, nondistended, +parastomal hernia, +tenderness and erythema around ostomy site, +erythema in L abdomen and b/l groin w/ desquamation of skin, no crepitus, fluctuance or drainage noted  Extremities: WWP; no edema/cyanosis  Neurological: A&Ox3, CNII-XII grossly intact; no obvious focal deficits

## 2024-01-01 NOTE — PROGRESS NOTE ADULT - ATTENDING COMMENTS
43F w/ PMHx Asthma, CVA (11/2021; residual L>R weakness), PE (4/2023 on Eliquis, last taken 12/29), T2DM (on insulin), HTN, HLD with hx abdominal necrotizing fasciitis s/p diverting colostomy creation (11/2021; [facility]), CAD (on , 2 NAT in 6/23) with PSHx of numerous abdominal wall hernia repairs who presents to [facility] for a constellation of medical complaints. Surgery consulted for evaluation of abdominal wall and groin erythema to rule out necrotizing fasciitis and parastomal hernia.   No crepitus, fluctuance or erythema appreciated on exam.   CTAP without evidence of drainable collection or necrotizing fasciitis    Recommendations:  - No acute surgical intervention indicated at this time  - Continue strict blood glucose control   - Consult ID  - Team 4C will continue to follow. Please call with any questions/concerns. 43F w/ PMHx Asthma, CVA (11/2021; residual L>R weakness), PE (4/2023 on Eliquis, last taken 12/29), T2DM (on insulin), HTN, HLD with hx abdominal necrotizing fasciitis s/p diverting colostomy creation (11/2021; [facility]), CAD (on , 2 NAT in 6/23) with PSHx of numerous abdominal wall hernia repairs who presents to [facility] for a constellation of medical complaints. Surgery consulted for evaluation of abdominal wall and groin erythema to rule out necrotizing fasciitis and parastomal hernia.   No crepitus, fluctuance or erythema appreciated on exam.   CTAP without evidence of drainable collection or necrotizing fasciitis    Recommendations:  - No acute surgical intervention indicated at this time  - Continue strict blood glucose control   - Consult ID  - Team 4C will continue to follow. Please call with any questions/concerns.

## 2024-01-01 NOTE — CONSULT NOTE ADULT - SUBJECTIVE AND OBJECTIVE BOX
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, CAD s/p PCI, abdominal hernia s/p multiple repairs, HTN, HLD and admission in 2023 for L labial/inguinal abscess s/p I&D (MSSA, S.haemolyticus, E.faecium) and UTI (MSSA, E.faecalis), BCx negative, treated with 7 day course of linezolid. Also has hx of MDR CRE K.pneumo from 2023 UCx. She now presented  with erythema in bilateral groin and ?around ostomy which she reports being present for at least 4 weeks, with some itchiness/mild tenderness, and a ~month history of chest pain/SOB/dizziness, and nausea/vomiting after PO intake. Afebrile on admission, tachycardic but normotensive and on room air. No leukocytosis, but severe hyperglycemia >600, with lactic acidosis which quickly resolved with fluids. UA with 24 WBC and UCx with >100k CFU MSSA. BCx ngtd. CT torso and BLE with contrast showed no PE, clear lungs, with no evidence of necrotizing fasciitis of abdominal wall/perineum or proximal lower extremities. Seen by surgery, with no signs of nec fasc on exam. Received a dose of linezolid and ertapenem and ID consulted for further recommendations.       PAST MEDICAL & SURGICAL HISTORY:  Essential hypertension      Hyperlipidemia      Obesity      Abdominal hernia      Pre-eclampsia      Pulmonary embolism      Type 2 diabetes mellitus      History of necrotizing fasciitis      History of creation of ostomy      Suprapubic catheter      H/O: CVA (cerebrovascular accident)      Sepsis, unspecified organism      H/O LEEP      History of D&C      H/O:       H/O ventral hernia repair      H/O exploratory laparotomy      History of creation of ostomy            Review of Systems:   Constitutional, eyes, ENT, cardiovascular, respiratory, gastrointestinal, genitourinary, integumentary, neurological, psychiatric and heme/lymph are otherwise negative other than noted above       ANTIBIOTICS:  MEDICATIONS  (STANDING):  apixaban 5 milliGRAM(s) Oral every 12 hours  atorvastatin 80 milliGRAM(s) Oral at bedtime  chlorhexidine 2% Cloths 1 Application(s) Topical <User Schedule>  clopidogrel Tablet 75 milliGRAM(s) Oral daily  clotrimazole 1% Cream 1 Application(s) Topical two times a day  dextrose 5%. 1000 milliLiter(s) (50 mL/Hr) IV Continuous <Continuous>  dextrose 5%. 1000 milliLiter(s) (100 mL/Hr) IV Continuous <Continuous>  dextrose 50% Injectable 25 Gram(s) IV Push once  dextrose 50% Injectable 25 Gram(s) IV Push once  dextrose 50% Injectable 12.5 Gram(s) IV Push once  ertapenem  IVPB 1000 milliGRAM(s) IV Intermittent every 24 hours  glucagon  Injectable 1 milliGRAM(s) IntraMuscular once  insulin lispro (ADMELOG) corrective regimen sliding scale   SubCutaneous three times a day before meals  insulin lispro Injectable (ADMELOG) 35 Unit(s) SubCutaneous three times a day before meals  insulin NPH human recombinant 65 Unit(s) SubCutaneous <User Schedule>  insulin regular Infusion 1 Unit(s)/Hr (1 mL/Hr) IV Continuous <Continuous>  linezolid  IVPB      linezolid  IVPB 600 milliGRAM(s) IV Intermittent once  metoprolol succinate ER 50 milliGRAM(s) Oral daily  pantoprazole    Tablet 40 milliGRAM(s) Oral every 24 hours  sodium chloride 0.9% 1000 milliLiter(s) (200 mL/Hr) IV Continuous <Continuous>    MEDICATIONS  (PRN):  dextrose Oral Gel 15 Gram(s) Oral once PRN Blood Glucose LESS THAN 70 milliGRAM(s)/deciliter  oxyCODONE    IR 10 milliGRAM(s) Oral every 4 hours PRN Severe Pain (7 - 10)      Allergies    iodine containing compounds (Hives; Anaphylaxis)  clindamycin (Pruritus)  fish (Hives; Urticaria)  vancomycin (Anaphylaxis; Hives)  penicillin (Hives)  shellfish. (Hives; Anaphylaxis)  amoxicillin (Short breath; Rash)    Intolerances    Bactrim I.V. (Rash (Mod to Severe))      SOCIAL HISTORY:  Lives in the [place] with  and children. No pets  No EtOH/tobacco/drug use  No recent travel    FAMILY HISTORY:  FH: diabetes mellitus  father and mother    FH: hypertension  father and mother     no FH leading to current infection    Vital Signs Last 24 Hrs  T(C): 36.4 (2024 18:38), Max: 36.8 (31 Dec 2023 22:34)  T(F): 97.5 (2024 18:38), Max: 98.3 (31 Dec 2023 22:34)  HR: 98 (2024 18:00) (80 - 106)  BP: 141/65 (2024 17:00) (119/71 - 193/95)  BP(mean): 93 (2024 17:00) (89 - 136)  RR: 23 (2024 18:00) (18 - 39)  SpO2: 99% (2024 18:00) (95% - 117%)    Parameters below as of 2024 18:00  Patient On (Oxygen Delivery Method): room air        23 @ 07:  -  24 @ 07:00  --------------------------------------------------------  IN: 1538 mL / OUT: 2600 mL / NET: -1062 mL    24 @ 07:01  24 @ 18:56  --------------------------------------------------------  IN: 1099.9 mL / OUT: 600 mL / NET: 499.9 mL        PHYSICAL EXAM:  Constitutional: alert, NAD  Eyes: the sclera and conjunctiva were normal.   ENT: the ears and nose were normal in appearance.   Mouth: Poor dentition  Neck: the appearance of the neck was normal and the neck was supple.   Pulmonary: no respiratory distress and lungs were clear to auscultation bilaterally.   Heart: heart rate was normal and rhythm regular, normal S1 and S2, no murmur  Abdomen: Ostomy w/ small volume liquid stool  MSK: No joint swelling/pain. No midline spine tenderness  Skin: Redness in bilateral inguinal folds, inner thighs, underneath pannus, and in perineal and joey-anal areas. Mildly tender. No fluctuance or crepitus.       LABS:                        12.8   12.87 )-----------( 318      ( 2024 04:36 )             41.7         132<L>  |  104  |  11  ----------------------------<  247<H>  4.3   |  17<L>  |  0.82    Ca    8.4      2024 15:52  Phos  2.5       Mg     1.8         TPro  7.0  /  Alb  3.0<L>  /  TBili  <0.2  /  DBili  x   /  AST  48<H>  /  ALT  23  /  AlkPhos  187<H>      PT/INR - ( 31 Dec 2023 12:46 )   PT: 11.5 sec;   INR: 1.01          PTT - ( 31 Dec 2023 12:46 )  PTT:28.8 sec  Urinalysis Basic - ( 2024 15:52 )    Color: x / Appearance: x / SG: x / pH: x  Gluc: 247 mg/dL / Ketone: x  / Bili: x / Urobili: x   Blood: x / Protein: x / Nitrite: x   Leuk Esterase: x / RBC: x / WBC x   Sq Epi: x / Non Sq Epi: x / Bacteria: x        MICROBIOLOGY:  Reviewed    RADIOLOGY & ADDITIONAL STUDIES:  Reviewed INFECTIOUS DISEASES INITIAL CONSULT NOTE    HPI:  43F with hx DM (A1c 16.4%), 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 Also has hx of MDR CRE K.pneumo from 2023 UCx. She now presented  with erythema in bilateral groin and ?around ostomy which she reports being present for at least 4 weeks, with some itchiness/mild tenderness, and a ~month history of chest pain/SOB/dizziness, and nausea/vomiting after PO intake. Afebrile on admission, tachycardic but normotensive and on room air. No leukocytosis, but severe hyperglycemia >600, with lactic acidosis which quickly resolved with fluids. UA with 24 WBC and UCx with >100k CFU MSSA. BCx ngtd. CT torso and BLE with contrast showed no PE, clear lungs, with no evidence of necrotizing fasciitis of abdominal wall/perineum or proximal lower extremities. Seen by surgery, with no signs of nec fasc on exam. Received a dose of linezolid and ertapenem and ID consulted for further recommendations.       PAST MEDICAL & SURGICAL HISTORY:  Essential hypertension      Hyperlipidemia      Obesity      Abdominal hernia      Pre-eclampsia      Pulmonary embolism      Type 2 diabetes mellitus      History of necrotizing fasciitis      History of creation of ostomy      Suprapubic catheter      H/O: CVA (cerebrovascular accident)      Sepsis, unspecified organism      H/O LEEP      History of D&C      H/O:       H/O ventral hernia repair      H/O exploratory laparotomy      History of creation of ostomy            Review of Systems:   Constitutional, eyes, ENT, cardiovascular, respiratory, gastrointestinal, genitourinary, integumentary, neurological, psychiatric and heme/lymph are otherwise negative other than noted above       ANTIBIOTICS:  MEDICATIONS  (STANDING):  apixaban 5 milliGRAM(s) Oral every 12 hours  atorvastatin 80 milliGRAM(s) Oral at bedtime  chlorhexidine 2% Cloths 1 Application(s) Topical <User Schedule>  clopidogrel Tablet 75 milliGRAM(s) Oral daily  clotrimazole 1% Cream 1 Application(s) Topical two times a day  dextrose 5%. 1000 milliLiter(s) (50 mL/Hr) IV Continuous <Continuous>  dextrose 5%. 1000 milliLiter(s) (100 mL/Hr) IV Continuous <Continuous>  dextrose 50% Injectable 25 Gram(s) IV Push once  dextrose 50% Injectable 25 Gram(s) IV Push once  dextrose 50% Injectable 12.5 Gram(s) IV Push once  ertapenem  IVPB 1000 milliGRAM(s) IV Intermittent every 24 hours  glucagon  Injectable 1 milliGRAM(s) IntraMuscular once  insulin lispro (ADMELOG) corrective regimen sliding scale   SubCutaneous three times a day before meals  insulin lispro Injectable (ADMELOG) 35 Unit(s) SubCutaneous three times a day before meals  insulin NPH human recombinant 65 Unit(s) SubCutaneous <User Schedule>  insulin regular Infusion 1 Unit(s)/Hr (1 mL/Hr) IV Continuous <Continuous>  linezolid  IVPB      linezolid  IVPB 600 milliGRAM(s) IV Intermittent once  metoprolol succinate ER 50 milliGRAM(s) Oral daily  pantoprazole    Tablet 40 milliGRAM(s) Oral every 24 hours  sodium chloride 0.9% 1000 milliLiter(s) (200 mL/Hr) IV Continuous <Continuous>    MEDICATIONS  (PRN):  dextrose Oral Gel 15 Gram(s) Oral once PRN Blood Glucose LESS THAN 70 milliGRAM(s)/deciliter  oxyCODONE    IR 10 milliGRAM(s) Oral every 4 hours PRN Severe Pain (7 - 10)      Allergies    iodine containing compounds (Hives; Anaphylaxis)  clindamycin (Pruritus)  fish (Hives; Urticaria)  vancomycin (Anaphylaxis; Hives)  penicillin (Hives)  shellfish. (Hives; Anaphylaxis)  amoxicillin (Short breath; Rash)    Intolerances    Bactrim I.V. (Rash (Mod to Severe))      SOCIAL HISTORY:  Lives in the [place] with  and children. No pets  No EtOH/tobacco/drug use  No recent travel    FAMILY HISTORY:  FH: diabetes mellitus  father and mother    FH: hypertension  father and mother     no FH leading to current infection    Vital Signs Last 24 Hrs  T(C): 36.4 (2024 18:38), Max: 36.8 (31 Dec 2023 22:34)  T(F): 97.5 (2024 18:38), Max: 98.3 (31 Dec 2023 22:34)  HR: 98 (2024 18:00) (80 - 106)  BP: 141/65 (2024 17:00) (119/71 - 193/95)  BP(mean): 93 (2024 17:00) (89 - 136)  RR: 23 (2024 18:00) (18 - 39)  SpO2: 99% (2024 18:00) (95% - 117%)    Parameters below as of 2024 18:00  Patient On (Oxygen Delivery Method): room air        23 @ 07:  -  24 @ 07:00  --------------------------------------------------------  IN: 1538 mL / OUT: 2600 mL / NET: -1062 mL    24 @ 07:01  24 @ 18:56  --------------------------------------------------------  IN: 1099.9 mL / OUT: 600 mL / NET: 499.9 mL        PHYSICAL EXAM:  Constitutional: alert, NAD  Eyes: the sclera and conjunctiva were normal.   ENT: the ears and nose were normal in appearance.   Mouth: Poor dentition  Neck: the appearance of the neck was normal and the neck was supple.   Pulmonary: no respiratory distress and lungs were clear to auscultation bilaterally.   Heart: heart rate was normal and rhythm regular, normal S1 and S2, no murmur  Abdomen: Ostomy w/ small volume liquid stool  MSK: No joint swelling/pain. No midline spine tenderness  Skin: Redness in bilateral inguinal folds, inner thighs, underneath pannus, and in perineal and joey-anal areas. Mildly tender. No fluctuance or crepitus.       LABS:                        12.8   12.87 )-----------( 318      ( 2024 04:36 )             41.7         132<L>  |  104  |  11  ----------------------------<  247<H>  4.3   |  17<L>  |  0.82    Ca    8.4      2024 15:52  Phos  2.5       Mg     1.8         TPro  7.0  /  Alb  3.0<L>  /  TBili  <0.2  /  DBili  x   /  AST  48<H>  /  ALT  23  /  AlkPhos  187<H>      PT/INR - ( 31 Dec 2023 12:46 )   PT: 11.5 sec;   INR: 1.01          PTT - ( 31 Dec 2023 12:46 )  PTT:28.8 sec  Urinalysis Basic - ( 2024 15:52 )    Color: x / Appearance: x / SG: x / pH: x  Gluc: 247 mg/dL / Ketone: x  / Bili: x / Urobili: x   Blood: x / Protein: x / Nitrite: x   Leuk Esterase: x / RBC: x / WBC x   Sq Epi: x / Non Sq Epi: x / Bacteria: x        MICROBIOLOGY:  Reviewed    RADIOLOGY & ADDITIONAL STUDIES:  Reviewed

## 2024-01-01 NOTE — CONSULT NOTE ADULT - SUBJECTIVE AND OBJECTIVE BOX
HISTORY OF PRESENT ILLNESS  Rachel Katz is a 43-year-old female with a past medical history of uncontrolled type 2 diabetes mellitus, hypertension, hyperlipidemia, CVA (11/2021 with left sided residual weakness), pulmonary embolism (4/2023, on eliquis), coronary artery disease (PCI s/p NAT 7/2027 and 8/2027), history of abdominal necrotizing fasciitis (ostomy placement in 11/2021), multiple abdominal hernia repairs, recurrent UTI and previous admission for left labial abscess (8/2023) who presented to the emergency department complaining of left groin swelling that progressively increased over the previous 3 weeks.     In the emergency department, her labs were remarkable for elevated ESR (42 mm/hr), CRP (14.8 mg/L), hyperglycemia (654 mg/dL) and hypokalemia (2.8 mmol/L). Her anion gap was elevated to 20, but likely due to underlying lactic acidosis (lactic acid 6.9 mmol/L) as beta-hydroxybutyrate was within normal limits (0.4 mmol/L). Urinalysis showed WBC 24, with moderate bacteria, small leukocyte esterase and positive yeast-like cells. CT abdomen and pelvis was remarkable for a 1.2 cm left adrenal nodule (unchanged from previous imaging) without abscess of labia (s/p I&D of left labia abscess during last admission on 8/2023). She received 1 gram of ertapenem for possible cellulitis along with 125 mg of solumedrol.     The patient was admitted to the MICU for further management of hyperglycemia and cellulitis and was placed on an insulin infusion overnight with improvement of glucose levels. This morning, she was transitioned to a basal-bolus regimen with Lantus 50 units and Admelog 20 units three times daily before meals this morning. However, her glucose has continued to increase back to the 300s mg/dL despite above regimen. Endocrinology was consulted for recommendations regarding diabetes management.     CAPILLARY BLOOD GLUCOSE & INSULIN RECEIVED  >600 mg/dL (01-01 @ 01:35) -> Insulin infusion at 4 units/hr.  558 mg/dL (01-01 @ 02:28) -> Insulin infusion at 8 units/hr.  453 mg/dL (01-01 @ 03:35) -> Insulin infusion at 12 units/hr.  404 mg/dL (01-01 @ 04:36) -> Insulin infusion at 16 units/hr.  268 mg/dL (01-01 @ 05:32) -> Insulin infusion at 20 units/hr.  213 mg/dL (01-01 @ 06:18) -> Insulin infusion at 16 units/hr.  138 mg/dL (01-01 @ 07:26) -> Insulin infusion at 12 units/hr.  142 mg/dL (01-01 @ 08:30) -> ?  160 mg/dL (01-01 @ 09:28) -> ?  179 mg/dL (01-01 @ 10:31) -> 50 units of lantus + 20 units of lispro + 2 units of lispro sliding scale.  239 mg/dL (01-01 @ 11:35) -> Ø  310 mg/dL (01-01 @ 12:47) -> Ø  363 mg/dL (01-01 @ 13:36) -> Ø  414 mg/dL (01-01 @ 14:32) -> 12 units of lispro sliding scale.     DIABETES HISTORY  The patient is known to the endocrine service for multiple previous admissions, last seen on 8/2023. She was diagnosed with type 2 diabetes mellitus in 2005. She was started on metformin 1,000 mg twice daily on 2007. In 2009, she was pregnant and was started on glyburide (did not require insulin for pregnancy). On October 2020, she began seeing Dr. Joseph and was eventually transitioned to insulin followed by Humulin U500. During her last admission she was managed with Lantus 50 twice daily and lispro 25 three times daily before meals with labile glucose levels ranging from  mg/dL. She was discharged back on home Humalin U500 60 units three times daily and admelog 26 units three times daily with meals.     - History of hypoglycemia:   - Complications:   - Home FSG:        > Fasting: *** mg/dL.        > Before meals: *** mg/dL.        > Bedtime: *** mg/dL.  - Diet:          > Breakfast:         > Lunch:        > Dinner:        > Snacks:  - Outpatient follow-up: Dr. Joseph (Endocrinologist).     PAST MEDICAL & SURGICAL HISTORY  As per history of present illness.     FAMILY HISTORY  - Diabetes:  - Thyroid:  - Autoimmune:  - Other:    SOCIAL HISTORY  - Work:  - Alcohol:  - Smoking:  - Recreational Drugs:    ALLERGIES  iodine containing compounds (Hives; Anaphylaxis)  clindamycin (Pruritus)  fish (Hives; Urticaria)  vancomycin (Anaphylaxis; Hives)  penicillin (Hives)  shellfish. (Hives; Anaphylaxis)  amoxicillin (Short breath; Rash)  Bactrim I.V. (Rash (Mod to Severe))    CURRENT MEDICATIONS  apixaban 5 milliGRAM(s) Oral every 12 hours  atorvastatin 80 milliGRAM(s) Oral at bedtime  chlorhexidine 2% Cloths 1 Application(s) Topical <User Schedule>  clopidogrel Tablet 75 milliGRAM(s) Oral daily  clotrimazole 1% Cream 1 Application(s) Topical two times a day  dextrose 5%. 1000 milliLiter(s) IV Continuous <Continuous>  dextrose 5%. 1000 milliLiter(s) IV Continuous <Continuous>  dextrose 50% Injectable 25 Gram(s) IV Push once  dextrose 50% Injectable 12.5 Gram(s) IV Push once  dextrose 50% Injectable 25 Gram(s) IV Push once  dextrose Oral Gel 15 Gram(s) Oral once PRN  glucagon  Injectable 1 milliGRAM(s) IntraMuscular once  insulin glargine Injectable (LANTUS) 50 Unit(s) SubCutaneous every morning  insulin lispro (ADMELOG) corrective regimen sliding scale   SubCutaneous three times a day before meals  insulin lispro Injectable (ADMELOG) 20 Unit(s) SubCutaneous three times a day before meals  insulin regular Infusion 1 Unit(s)/Hr IV Continuous <Continuous>  metoprolol succinate ER 50 milliGRAM(s) Oral daily  oxyCODONE    IR 10 milliGRAM(s) Oral every 4 hours PRN  pantoprazole    Tablet 40 milliGRAM(s) Oral every 24 hours  sodium chloride 0.9% 1000 milliLiter(s) IV Continuous <Continuous>    REVIEW OF SYSTEMS  Constitutional:  Negative fever, chills or loss of appetite.  Eyes:  Negative blurry vision or double vision.  Cardiovascular:  Negative for chest pain or palpitations.  Respiratory:  Negative for cough, wheezing, or shortness of breath.   Gastrointestinal:  Negative for nausea, vomiting, diarrhea, constipation, or abdominal pain.  Genitourinary:  Negative frequency, urgency or dysuria.  Neurologic:  No headache, confusion, dizziness, lightheadedness.    PHYSICAL EXAM  Vital Signs Last 24 Hrs  T(C): 35.4 (01 Jan 2024 09:44), Max: 36.8 (31 Dec 2023 22:34)  T(F): 95.8 (01 Jan 2024 09:44), Max: 98.3 (31 Dec 2023 22:34)  HR: 99 (01 Jan 2024 14:00) (67 - 106)  BP: 126/66 (01 Jan 2024 14:00) (119/71 - 193/95)  BP(mean): 90 (01 Jan 2024 14:00) (89 - 136)  RR: 26 (01 Jan 2024 14:00) (18 - 39)  SpO2: 99% (01 Jan 2024 14:00) (95% - 117%)    Parameters below as of 01 Jan 2024 14:00  Patient On (Oxygen Delivery Method): room air    Constitutional: Awake, alert, in no acute distress.   HEENT: Normocephalic, atraumatic, NAOMI, no proptosis or lid retraction.   Neck: supple, no acanthosis, no thyromegaly or palpable thyroid nodules.  Respiratory: Lungs clear to ausculation bilaterally.   Cardiovascular: regular rhythm, normal S1 and S2, no audible murmurs.   GI: soft, non-tender, non-distended, bowel sounds present, no masses appreciated.  Extremities: No lower extremity edema, peripheral pulses present.   Skin: no rashes.   Psychiatric: AAO x 3. Normal affect/mood.     LABS  CBC - WBC/HGB/HTC/PLT: 12.87/12.8/41.7/318 (01-01-24)  BMP: Na/K/Cl/Bicarb/BUN/Cr/Gluc: 130/4.2/96/16/10/0.61/420 (01-01-24)  Anion Gap: 18 (01-01-24)  eGFR: 114 (01-01-24)  Calcium: 8.3 (01-01-24)  Phosphorus: 1.3 (01-01-24)  Magnesium: 2.3 (01-01-24)  LFT - Alb/Tprot/Tbili/Dbili/AlkPhos/ALT/AST: 3.5/--/0.3/0.2/187/15/18 (12-31-23)  PT/aPTT/INR: 11.5/28.8/1.01 (12-31-23)  Thyroid Stimulating Hormone, Serum: 0.797 (12-31-23)    ASSESSMENT / RECOMMENDATIONS  Ms. Katz is a 43-year-old female with a past medical history of uncontrolled type 2 diabetes mellitus, hypertension, hyperlipidemia, CVA (11/2021 with left sided residual weakness), pulmonary embolism (4/2023, on eliquis), coronary artery disease (PCI s/p NAT 7/2027 and 8/2027), history of abdominal necrotizing fasciitis (ostomy placement in 11/2021), multiple abdominal hernia repairs, recurrent UTI and previous admission for left labial abscess (8/2023) who presented to the emergency department complaining of left groin swelling that progressively increased over the previous 3 weeks. She was admitted to the MICU for further management of hyperglycemia, and was placed on an insulin infusion overnight with improvement of glucose levels. She patient was transitioned to a basal-bolus regimen with Lantus 50 units and Admelog 20 units three times daily before meals this morning. However, her glucose has continued to increase back to the 300s mg/dL despite above regimen. Endocrinology was consulted for recommendations regarding diabetes management.     A1C: 16.4 %  BUN: 10  Creatinine: 0.61  GFR: 114  Weight: 99.8    # Type 2 diabetes mellitus with hyperglycemia  - Please continue lantus *** units at bedtime.   - Continue lispro *** units before each meal.  - Continue lispro moderate / low dose sliding scale before meals and at bedtime.  - Patient's fingerstick glucose goal is 100-180 mg/dL.    - Discharge recommendations to be discussed.   - Patient can follow up at discharge with [facility] by calling [phone] to make an appointment.      Case discussed with Dr. Coyle. Primary team updated.       Gerald Qiu    Endocrinology Fellow    Service Pager: [phone]  HISTORY OF PRESENT ILLNESS  Rachel Katz is a 43-year-old female with a past medical history of uncontrolled type 2 diabetes mellitus, hypertension, hyperlipidemia, CVA (11/2021 with left sided residual weakness), pulmonary embolism (4/2023, on eliquis), coronary artery disease (PCI s/p NAT 7/2027 and 8/2027), history of abdominal necrotizing fasciitis (ostomy placement in 11/2021), multiple abdominal hernia repairs, recurrent UTI and previous admission for left labial abscess (8/2023) who presented to the emergency department complaining of left groin swelling that progressively increased over the previous 3 weeks.     In the emergency department, her labs were remarkable for elevated ESR (42 mm/hr), CRP (14.8 mg/L), hyperglycemia (654 mg/dL) and hypokalemia (2.8 mmol/L). Her anion gap was elevated to 20, but likely due to underlying lactic acidosis (lactic acid 6.9 mmol/L) as beta-hydroxybutyrate was within normal limits (0.4 mmol/L). Urinalysis showed WBC 24, with moderate bacteria, small leukocyte esterase and positive yeast-like cells. CT abdomen and pelvis was remarkable for a 1.2 cm left adrenal nodule (unchanged from previous imaging) without abscess of labia (s/p I&D of left labia abscess during last admission on 8/2023). She received 1 gram of ertapenem for possible cellulitis along with 125 mg of solumedrol.     The patient was admitted to the MICU for further management of hyperglycemia and cellulitis and was placed on an insulin infusion overnight with improvement of glucose levels. This morning, she was transitioned to a basal-bolus regimen with Lantus 50 units and Admelog 20 units three times daily before meals this morning. However, her glucose has continued to increase back to the 300s mg/dL despite above regimen. Endocrinology was consulted for recommendations regarding diabetes management.     CAPILLARY BLOOD GLUCOSE & INSULIN RECEIVED  >600 mg/dL (01-01 @ 01:35) -> Insulin infusion at 4 units/hr.  558 mg/dL (01-01 @ 02:28) -> Insulin infusion at 8 units/hr.  453 mg/dL (01-01 @ 03:35) -> Insulin infusion at 12 units/hr.  404 mg/dL (01-01 @ 04:36) -> Insulin infusion at 16 units/hr.  268 mg/dL (01-01 @ 05:32) -> Insulin infusion at 20 units/hr.  213 mg/dL (01-01 @ 06:18) -> Insulin infusion at 16 units/hr.  138 mg/dL (01-01 @ 07:26) -> Insulin infusion at 12 units/hr.  142 mg/dL (01-01 @ 08:30) -> ?  160 mg/dL (01-01 @ 09:28) -> ?  179 mg/dL (01-01 @ 10:31) -> 50 units of lantus + 20 units of lispro + 2 units of lispro sliding scale.  239 mg/dL (01-01 @ 11:35) -> Ø  310 mg/dL (01-01 @ 12:47) -> Ø  363 mg/dL (01-01 @ 13:36) -> Ø  414 mg/dL (01-01 @ 14:32) -> 12 units of lispro sliding scale.     DIABETES HISTORY  The patient is known to the endocrine service for multiple previous admissions, last seen on 8/2023. She was diagnosed with type 2 diabetes mellitus in 2005. She was started on metformin 1,000 mg twice daily on 2007. In 2009, she was pregnant and was started on glyburide (did not require insulin for pregnancy). On October 2020, she began seeing Dr. Joseph and was eventually transitioned to insulin followed by Humulin U500. During her last admission she was managed with Lantus 50 twice daily and lispro 25 three times daily before meals with labile glucose levels ranging from  mg/dL. She was discharged back on home Humalin U500 60 units three times daily and admelog 26 units three times daily with meals.     - History of hypoglycemia:   - Complications:   - Home FSG:        > Fasting: *** mg/dL.        > Before meals: *** mg/dL.        > Bedtime: *** mg/dL.  - Diet:          > Breakfast:         > Lunch:        > Dinner:        > Snacks:  - Outpatient follow-up: Dr. Joseph (Endocrinologist).     PAST MEDICAL & SURGICAL HISTORY  As per history of present illness.     FAMILY HISTORY  - Diabetes:  - Thyroid:  - Autoimmune:  - Other:    SOCIAL HISTORY  - Work:  - Alcohol:  - Smoking:  - Recreational Drugs:    ALLERGIES  iodine containing compounds (Hives; Anaphylaxis)  clindamycin (Pruritus)  fish (Hives; Urticaria)  vancomycin (Anaphylaxis; Hives)  penicillin (Hives)  shellfish. (Hives; Anaphylaxis)  amoxicillin (Short breath; Rash)  Bactrim I.V. (Rash (Mod to Severe))    CURRENT MEDICATIONS  apixaban 5 milliGRAM(s) Oral every 12 hours  atorvastatin 80 milliGRAM(s) Oral at bedtime  chlorhexidine 2% Cloths 1 Application(s) Topical <User Schedule>  clopidogrel Tablet 75 milliGRAM(s) Oral daily  clotrimazole 1% Cream 1 Application(s) Topical two times a day  dextrose 5%. 1000 milliLiter(s) IV Continuous <Continuous>  dextrose 5%. 1000 milliLiter(s) IV Continuous <Continuous>  dextrose 50% Injectable 25 Gram(s) IV Push once  dextrose 50% Injectable 12.5 Gram(s) IV Push once  dextrose 50% Injectable 25 Gram(s) IV Push once  dextrose Oral Gel 15 Gram(s) Oral once PRN  glucagon  Injectable 1 milliGRAM(s) IntraMuscular once  insulin glargine Injectable (LANTUS) 50 Unit(s) SubCutaneous every morning  insulin lispro (ADMELOG) corrective regimen sliding scale   SubCutaneous three times a day before meals  insulin lispro Injectable (ADMELOG) 20 Unit(s) SubCutaneous three times a day before meals  insulin regular Infusion 1 Unit(s)/Hr IV Continuous <Continuous>  metoprolol succinate ER 50 milliGRAM(s) Oral daily  oxyCODONE    IR 10 milliGRAM(s) Oral every 4 hours PRN  pantoprazole    Tablet 40 milliGRAM(s) Oral every 24 hours  sodium chloride 0.9% 1000 milliLiter(s) IV Continuous <Continuous>    REVIEW OF SYSTEMS  Constitutional:  Negative fever, chills or loss of appetite.  Eyes:  Negative blurry vision or double vision.  Cardiovascular:  Negative for chest pain or palpitations.  Respiratory:  Negative for cough, wheezing, or shortness of breath.   Gastrointestinal:  Negative for nausea, vomiting, diarrhea, constipation, or abdominal pain.  Genitourinary:  Negative frequency, urgency or dysuria.  Neurologic:  No headache, confusion, dizziness, lightheadedness.    PHYSICAL EXAM  Vital Signs Last 24 Hrs  T(C): 35.4 (01 Jan 2024 09:44), Max: 36.8 (31 Dec 2023 22:34)  T(F): 95.8 (01 Jan 2024 09:44), Max: 98.3 (31 Dec 2023 22:34)  HR: 99 (01 Jan 2024 14:00) (67 - 106)  BP: 126/66 (01 Jan 2024 14:00) (119/71 - 193/95)  BP(mean): 90 (01 Jan 2024 14:00) (89 - 136)  RR: 26 (01 Jan 2024 14:00) (18 - 39)  SpO2: 99% (01 Jan 2024 14:00) (95% - 117%)    Parameters below as of 01 Jan 2024 14:00  Patient On (Oxygen Delivery Method): room air    Constitutional: Awake, alert, in no acute distress.   HEENT: Normocephalic, atraumatic, NAOMI, no proptosis or lid retraction.   Neck: supple, no acanthosis, no thyromegaly or palpable thyroid nodules.  Respiratory: Lungs clear to ausculation bilaterally.   Cardiovascular: regular rhythm, normal S1 and S2, no audible murmurs.   GI: soft, non-tender, non-distended, bowel sounds present, no masses appreciated.  Extremities: No lower extremity edema, peripheral pulses present.   Skin: no rashes.   Psychiatric: AAO x 3. Normal affect/mood.     LABS  CBC - WBC/HGB/HTC/PLT: 12.87/12.8/41.7/318 (01-01-24)  BMP: Na/K/Cl/Bicarb/BUN/Cr/Gluc: 130/4.2/96/16/10/0.61/420 (01-01-24)  Anion Gap: 18 (01-01-24)  eGFR: 114 (01-01-24)  Calcium: 8.3 (01-01-24)  Phosphorus: 1.3 (01-01-24)  Magnesium: 2.3 (01-01-24)  LFT - Alb/Tprot/Tbili/Dbili/AlkPhos/ALT/AST: 3.5/--/0.3/0.2/187/15/18 (12-31-23)  PT/aPTT/INR: 11.5/28.8/1.01 (12-31-23)  Thyroid Stimulating Hormone, Serum: 0.797 (12-31-23)    ASSESSMENT / RECOMMENDATIONS  Ms. Katz is a 43-year-old female with a past medical history of uncontrolled type 2 diabetes mellitus, hypertension, hyperlipidemia, CVA (11/2021 with left sided residual weakness), pulmonary embolism (4/2023, on eliquis), coronary artery disease (PCI s/p NAT 7/2027 and 8/2027), history of abdominal necrotizing fasciitis (ostomy placement in 11/2021), multiple abdominal hernia repairs, recurrent UTI and previous admission for left labial abscess (8/2023) who presented to the emergency department complaining of left groin swelling that progressively increased over the previous 3 weeks. She was admitted to the MICU for further management of hyperglycemia, and was placed on an insulin infusion overnight with improvement of glucose levels. She patient was transitioned to a basal-bolus regimen with Lantus 50 units and Admelog 20 units three times daily before meals this morning. However, her glucose has continued to increase back to the 300s mg/dL despite above regimen. Endocrinology was consulted for recommendations regarding diabetes management.     A1C: 16.4 %  BUN: 10  Creatinine: 0.61  GFR: 114  Weight: 99.8    # Type 2 diabetes mellitus with hyperglycemia  - Please continue lantus *** units at bedtime.   - Continue lispro *** units before each meal.  - Continue lispro moderate / low dose sliding scale before meals and at bedtime.  - Patient's fingerstick glucose goal is 100-180 mg/dL.    - Discharge recommendations to be discussed.   - Patient can follow up at discharge with [facility] by calling [phone] to make an appointment.      Case discussed with Dr. Coyle. Primary team updated.       Gerald Qiu    Endocrinology Fellow    Service Pager: [phone]  HISTORY OF PRESENT ILLNESS  Rachel Katz is a 43-year-old female with a past medical history of uncontrolled type 2 diabetes mellitus, hypertension, hyperlipidemia, CVA (11/2021 with left sided residual weakness), pulmonary embolism (4/2023, on eliquis), coronary artery disease (PCI s/p NAT 7/2027 and 8/2027), history of abdominal necrotizing fasciitis (ostomy placement in 11/2021), multiple abdominal hernia repairs, recurrent UTI and previous admission for left labial abscess (8/2023) who presented to the emergency department complaining of left groin swelling that progressively increased over the previous 3 weeks.     In the emergency department, her labs were remarkable for elevated ESR (42 mm/hr), CRP (14.8 mg/L), hyperglycemia (654 mg/dL) and hypokalemia (2.8 mmol/L). Her anion gap was elevated to 20, but likely due to underlying lactic acidosis (lactic acid 6.9 mmol/L) as beta-hydroxybutyrate was within normal limits (0.4 mmol/L). Urinalysis showed WBC 24, with moderate bacteria, small leukocyte esterase and positive yeast-like cells. CT abdomen and pelvis was remarkable for a 1.2 cm left adrenal nodule (unchanged from previous imaging) without abscess of labia (s/p I&D of left labia abscess during last admission on 8/2023). She received 1 gram of ertapenem for possible cellulitis along with 125 mg of solumedrol.     The patient was admitted to the MICU for further management of hyperglycemia and cellulitis and was placed on an insulin infusion overnight with improvement of glucose levels. This morning, she was transitioned to a basal-bolus regimen with Lantus 50 units and Admelog 20 units three times daily before meals this morning. However, her glucose has continued to increase back to the 300s mg/dL despite above regimen. Endocrinology was consulted for recommendations regarding diabetes management.     CAPILLARY BLOOD GLUCOSE & INSULIN RECEIVED  >600 mg/dL (01-01 @ 01:35) -> Insulin infusion at 4 units/hr.  558 mg/dL (01-01 @ 02:28) -> Insulin infusion at 8 units/hr.  453 mg/dL (01-01 @ 03:35) -> Insulin infusion at 12 units/hr.  404 mg/dL (01-01 @ 04:36) -> Insulin infusion at 16 units/hr.  268 mg/dL (01-01 @ 05:32) -> Insulin infusion at 20 units/hr.  213 mg/dL (01-01 @ 06:18) -> Insulin infusion at 16 units/hr.  138 mg/dL (01-01 @ 07:26) -> Insulin infusion at 12 units/hr.  142 mg/dL (01-01 @ 08:30) -> ?  160 mg/dL (01-01 @ 09:28) -> ?  179 mg/dL (01-01 @ 10:31) -> 50 units of lantus + 20 units of lispro + 2 units of lispro sliding scale.  239 mg/dL (01-01 @ 11:35) -> Ø  310 mg/dL (01-01 @ 12:47) -> Ø  363 mg/dL (01-01 @ 13:36) -> Ø  414 mg/dL (01-01 @ 14:32) -> 12 units of lispro sliding scale.     DIABETES HISTORY  The patient is known to the endocrine service for multiple previous admissions, last seen on 8/2023. She was diagnosed with type 2 diabetes mellitus in 2005. She was started on metformin 1,000 mg twice daily on 2007. In 2009, she was pregnant and was started on glyburide (did not require insulin for pregnancy). On October 2020, she began seeing Dr. Joseph and was eventually transitioned to insulin followed by Humulin U500. During her last admission she was managed with Lantus 50 twice daily and lispro 25 three times daily before meals with labile glucose levels ranging from  mg/dL. She was discharged back on home Humalin U500 60 units three times daily and admelog 26 units three times daily with meals.     - History of hypoglycemia:   - Complications:   - Home FSG:        > Fasting: *** mg/dL.        > Before meals: *** mg/dL.        > Bedtime: *** mg/dL.  - Diet:          > Breakfast:         > Lunch:        > Dinner:        > Snacks:  - Outpatient follow-up: Dr. Joseph (Endocrinologist), last seen in February 2023.     PAST MEDICAL & SURGICAL HISTORY  As per history of present illness.     FAMILY HISTORY  - Diabetes:  - Thyroid:  - Autoimmune:  - Other:    SOCIAL HISTORY  - Work:  - Alcohol:  - Smoking:  - Recreational Drugs:    ALLERGIES  iodine containing compounds (Hives; Anaphylaxis)  clindamycin (Pruritus)  fish (Hives; Urticaria)  vancomycin (Anaphylaxis; Hives)  penicillin (Hives)  shellfish. (Hives; Anaphylaxis)  amoxicillin (Short breath; Rash)  Bactrim I.V. (Rash (Mod to Severe))    CURRENT MEDICATIONS  apixaban 5 milliGRAM(s) Oral every 12 hours  atorvastatin 80 milliGRAM(s) Oral at bedtime  chlorhexidine 2% Cloths 1 Application(s) Topical <User Schedule>  clopidogrel Tablet 75 milliGRAM(s) Oral daily  clotrimazole 1% Cream 1 Application(s) Topical two times a day  dextrose 5%. 1000 milliLiter(s) IV Continuous <Continuous>  dextrose 5%. 1000 milliLiter(s) IV Continuous <Continuous>  dextrose 50% Injectable 25 Gram(s) IV Push once  dextrose 50% Injectable 12.5 Gram(s) IV Push once  dextrose 50% Injectable 25 Gram(s) IV Push once  dextrose Oral Gel 15 Gram(s) Oral once PRN  glucagon  Injectable 1 milliGRAM(s) IntraMuscular once  insulin glargine Injectable (LANTUS) 50 Unit(s) SubCutaneous every morning  insulin lispro (ADMELOG) corrective regimen sliding scale   SubCutaneous three times a day before meals  insulin lispro Injectable (ADMELOG) 20 Unit(s) SubCutaneous three times a day before meals  insulin regular Infusion 1 Unit(s)/Hr IV Continuous <Continuous>  metoprolol succinate ER 50 milliGRAM(s) Oral daily  oxyCODONE    IR 10 milliGRAM(s) Oral every 4 hours PRN  pantoprazole    Tablet 40 milliGRAM(s) Oral every 24 hours  sodium chloride 0.9% 1000 milliLiter(s) IV Continuous <Continuous>    REVIEW OF SYSTEMS  Constitutional:  Negative fever, chills or loss of appetite.  Eyes:  Negative blurry vision or double vision.  Cardiovascular:  Negative for chest pain or palpitations.  Respiratory:  Negative for cough, wheezing, or shortness of breath.   Gastrointestinal:  Negative for nausea, vomiting, diarrhea, constipation, or abdominal pain.  Genitourinary:  Negative frequency, urgency or dysuria.  Neurologic:  No headache, confusion, dizziness, lightheadedness.    PHYSICAL EXAM  Vital Signs Last 24 Hrs  T(C): 35.4 (01 Jan 2024 09:44), Max: 36.8 (31 Dec 2023 22:34)  T(F): 95.8 (01 Jan 2024 09:44), Max: 98.3 (31 Dec 2023 22:34)  HR: 99 (01 Jan 2024 14:00) (67 - 106)  BP: 126/66 (01 Jan 2024 14:00) (119/71 - 193/95)  BP(mean): 90 (01 Jan 2024 14:00) (89 - 136)  RR: 26 (01 Jan 2024 14:00) (18 - 39)  SpO2: 99% (01 Jan 2024 14:00) (95% - 117%)    Parameters below as of 01 Jan 2024 14:00  Patient On (Oxygen Delivery Method): room air    Constitutional: Awake, alert, in no acute distress.   HEENT: Normocephalic, atraumatic, NAOMI, no proptosis or lid retraction.   Neck: supple, no acanthosis, no thyromegaly or palpable thyroid nodules.  Respiratory: Lungs clear to ausculation bilaterally.   Cardiovascular: regular rhythm, normal S1 and S2, no audible murmurs.   GI: soft, non-tender, non-distended, bowel sounds present, no masses appreciated.  Extremities: No lower extremity edema, peripheral pulses present.   Skin: no rashes.   Psychiatric: AAO x 3. Normal affect/mood.     LABS  CBC - WBC/HGB/HTC/PLT: 12.87/12.8/41.7/318 (01-01-24)  BMP: Na/K/Cl/Bicarb/BUN/Cr/Gluc: 130/4.2/96/16/10/0.61/420 (01-01-24)  Anion Gap: 18 (01-01-24)  eGFR: 114 (01-01-24)  Calcium: 8.3 (01-01-24)  Phosphorus: 1.3 (01-01-24)  Magnesium: 2.3 (01-01-24)  LFT - Alb/Tprot/Tbili/Dbili/AlkPhos/ALT/AST: 3.5/--/0.3/0.2/187/15/18 (12-31-23)  PT/aPTT/INR: 11.5/28.8/1.01 (12-31-23)  Thyroid Stimulating Hormone, Serum: 0.797 (12-31-23)    ASSESSMENT / RECOMMENDATIONS  Ms. Katz is a 43-year-old female with a past medical history of uncontrolled type 2 diabetes mellitus, hypertension, hyperlipidemia, CVA (11/2021 with left sided residual weakness), pulmonary embolism (4/2023, on eliquis), coronary artery disease (PCI s/p NAT 7/2027 and 8/2027), history of abdominal necrotizing fasciitis (ostomy placement in 11/2021), multiple abdominal hernia repairs, recurrent UTI and previous admission for left labial abscess (8/2023) who presented to the emergency department complaining of left groin swelling that progressively increased over the previous 3 weeks. She was admitted to the MICU for further management of hyperglycemia, and was placed on an insulin infusion overnight with improvement of glucose levels. She patient was transitioned to a basal-bolus regimen with Lantus 50 units and Admelog 20 units three times daily before meals this morning. However, her glucose has continued to increase back to the 300s mg/dL despite above regimen. Endocrinology was consulted for recommendations regarding diabetes management.     A1C: 16.4 %  BUN: 10  Creatinine: 0.61  GFR: 114  Weight: 99.8    # Type 2 diabetes mellitus with hyperglycemia  - Please continue lantus *** units at bedtime.   - Continue lispro *** units before each meal.  - Continue lispro moderate / low dose sliding scale before meals and at bedtime.  - Patient's fingerstick glucose goal is 100-180 mg/dL.    - Discharge recommendations to be discussed.   - Patient can follow up at discharge with [facility] by calling [phone] to make an appointment.      Case discussed with Dr. Coyle. Primary team updated.       Gerald Qiu    Endocrinology Fellow    Service Pager: [phone]  HISTORY OF PRESENT ILLNESS  Rachel Katz is a 43-year-old female with a past medical history of uncontrolled type 2 diabetes mellitus, hypertension, hyperlipidemia, CVA (11/2021 with left sided residual weakness), pulmonary embolism (4/2023, on eliquis), coronary artery disease (PCI s/p NAT 7/2027 and 8/2027), history of abdominal necrotizing fasciitis (ostomy placement in 11/2021), multiple abdominal hernia repairs, recurrent UTI and previous admission for left labial abscess (8/2023) who presented to the emergency department complaining of left groin swelling that progressively increased over the previous 3 weeks.     In the emergency department, her labs were remarkable for elevated ESR (42 mm/hr), CRP (14.8 mg/L), hyperglycemia (654 mg/dL) and hypokalemia (2.8 mmol/L). Her anion gap was elevated to 20, but likely due to underlying lactic acidosis (lactic acid 6.9 mmol/L) as beta-hydroxybutyrate was within normal limits (0.4 mmol/L). Urinalysis showed WBC 24, with moderate bacteria, small leukocyte esterase and positive yeast-like cells. CT abdomen and pelvis was remarkable for a 1.2 cm left adrenal nodule (unchanged from previous imaging) without abscess of labia (s/p I&D of left labia abscess during last admission on 8/2023). She received 1 gram of ertapenem for possible cellulitis along with 125 mg of solumedrol.     The patient was admitted to the MICU for further management of hyperglycemia and cellulitis and was placed on an insulin infusion overnight with improvement of glucose levels. This morning, she was transitioned to a basal-bolus regimen with Lantus 50 units and Admelog 20 units three times daily before meals this morning. However, her glucose has continued to increase back to the 300s mg/dL despite above regimen. Endocrinology was consulted for recommendations regarding diabetes management.     CAPILLARY BLOOD GLUCOSE & INSULIN RECEIVED  >600 mg/dL (01-01 @ 01:35) -> Insulin infusion at 4 units/hr.  558 mg/dL (01-01 @ 02:28) -> Insulin infusion at 8 units/hr.  453 mg/dL (01-01 @ 03:35) -> Insulin infusion at 12 units/hr.  404 mg/dL (01-01 @ 04:36) -> Insulin infusion at 16 units/hr.  268 mg/dL (01-01 @ 05:32) -> Insulin infusion at 20 units/hr.  213 mg/dL (01-01 @ 06:18) -> Insulin infusion at 16 units/hr.  138 mg/dL (01-01 @ 07:26) -> Insulin infusion at 12 units/hr.  142 mg/dL (01-01 @ 08:30) -> ?  160 mg/dL (01-01 @ 09:28) -> ?  179 mg/dL (01-01 @ 10:31) -> 50 units of lantus + 20 units of lispro + 2 units of lispro sliding scale.  239 mg/dL (01-01 @ 11:35) -> Ø  310 mg/dL (01-01 @ 12:47) -> Ø  363 mg/dL (01-01 @ 13:36) -> Ø  414 mg/dL (01-01 @ 14:32) -> 12 units of lispro sliding scale.     DIABETES HISTORY  The patient is known to the endocrine service for multiple previous admissions, last seen on 8/2023. She was diagnosed with type 2 diabetes mellitus in 2005. She was started on metformin 1,000 mg twice daily on 2007. In 2009, she was pregnant and was started on glyburide (did not require insulin for pregnancy). On October 2020, she began seeing Dr. Joseph and was eventually transitioned to insulin followed by Humulin U500. During her last admission she was managed with Lantus 50 twice daily and lispro 25 three times daily before meals with labile glucose levels ranging from  mg/dL. She was discharged back on home Humalin U500 60 units three times daily and admelog 26 units three times daily with meals.     - History of hypoglycemia:   - Complications:   - Home FSG:        > Fasting: *** mg/dL.        > Before meals: *** mg/dL.        > Bedtime: *** mg/dL.  - Diet:          > Breakfast:         > Lunch:        > Dinner:        > Snacks:  - Outpatient follow-up: Dr. Joseph (Endocrinologist), last seen in February 2023.     PAST MEDICAL & SURGICAL HISTORY  As per history of present illness.     FAMILY HISTORY  - Diabetes:  - Thyroid:  - Autoimmune:  - Other:    SOCIAL HISTORY  - Work:  - Alcohol:  - Smoking:  - Recreational Drugs:    ALLERGIES  iodine containing compounds (Hives; Anaphylaxis)  clindamycin (Pruritus)  fish (Hives; Urticaria)  vancomycin (Anaphylaxis; Hives)  penicillin (Hives)  shellfish. (Hives; Anaphylaxis)  amoxicillin (Short breath; Rash)  Bactrim I.V. (Rash (Mod to Severe))    CURRENT MEDICATIONS  apixaban 5 milliGRAM(s) Oral every 12 hours  atorvastatin 80 milliGRAM(s) Oral at bedtime  chlorhexidine 2% Cloths 1 Application(s) Topical <User Schedule>  clopidogrel Tablet 75 milliGRAM(s) Oral daily  clotrimazole 1% Cream 1 Application(s) Topical two times a day  dextrose 5%. 1000 milliLiter(s) IV Continuous <Continuous>  dextrose 5%. 1000 milliLiter(s) IV Continuous <Continuous>  dextrose 50% Injectable 25 Gram(s) IV Push once  dextrose 50% Injectable 12.5 Gram(s) IV Push once  dextrose 50% Injectable 25 Gram(s) IV Push once  dextrose Oral Gel 15 Gram(s) Oral once PRN  glucagon  Injectable 1 milliGRAM(s) IntraMuscular once  insulin glargine Injectable (LANTUS) 50 Unit(s) SubCutaneous every morning  insulin lispro (ADMELOG) corrective regimen sliding scale   SubCutaneous three times a day before meals  insulin lispro Injectable (ADMELOG) 20 Unit(s) SubCutaneous three times a day before meals  insulin regular Infusion 1 Unit(s)/Hr IV Continuous <Continuous>  metoprolol succinate ER 50 milliGRAM(s) Oral daily  oxyCODONE    IR 10 milliGRAM(s) Oral every 4 hours PRN  pantoprazole    Tablet 40 milliGRAM(s) Oral every 24 hours  sodium chloride 0.9% 1000 milliLiter(s) IV Continuous <Continuous>    REVIEW OF SYSTEMS  Constitutional:  Negative fever, chills or loss of appetite.  Eyes:  Negative blurry vision or double vision.  Cardiovascular:  Negative for chest pain or palpitations.  Respiratory:  Negative for cough, wheezing, or shortness of breath.   Gastrointestinal:  Negative for nausea, vomiting, diarrhea, constipation, or abdominal pain.  Genitourinary:  Negative frequency, urgency or dysuria.  Neurologic:  No headache, confusion, dizziness, lightheadedness.    PHYSICAL EXAM  Vital Signs Last 24 Hrs  T(C): 35.4 (01 Jan 2024 09:44), Max: 36.8 (31 Dec 2023 22:34)  T(F): 95.8 (01 Jan 2024 09:44), Max: 98.3 (31 Dec 2023 22:34)  HR: 99 (01 Jan 2024 14:00) (67 - 106)  BP: 126/66 (01 Jan 2024 14:00) (119/71 - 193/95)  BP(mean): 90 (01 Jan 2024 14:00) (89 - 136)  RR: 26 (01 Jan 2024 14:00) (18 - 39)  SpO2: 99% (01 Jan 2024 14:00) (95% - 117%)    Parameters below as of 01 Jan 2024 14:00  Patient On (Oxygen Delivery Method): room air    Constitutional: Awake, alert, in no acute distress.   HEENT: Normocephalic, atraumatic, NAOMI, no proptosis or lid retraction.   Neck: supple, no acanthosis, no thyromegaly or palpable thyroid nodules.  Respiratory: Lungs clear to ausculation bilaterally.   Cardiovascular: regular rhythm, normal S1 and S2, no audible murmurs.   GI: soft, non-tender, non-distended, bowel sounds present, no masses appreciated.  Extremities: No lower extremity edema, peripheral pulses present.   Skin: no rashes.   Psychiatric: AAO x 3. Normal affect/mood.     LABS  CBC - WBC/HGB/HTC/PLT: 12.87/12.8/41.7/318 (01-01-24)  BMP: Na/K/Cl/Bicarb/BUN/Cr/Gluc: 130/4.2/96/16/10/0.61/420 (01-01-24)  Anion Gap: 18 (01-01-24)  eGFR: 114 (01-01-24)  Calcium: 8.3 (01-01-24)  Phosphorus: 1.3 (01-01-24)  Magnesium: 2.3 (01-01-24)  LFT - Alb/Tprot/Tbili/Dbili/AlkPhos/ALT/AST: 3.5/--/0.3/0.2/187/15/18 (12-31-23)  PT/aPTT/INR: 11.5/28.8/1.01 (12-31-23)  Thyroid Stimulating Hormone, Serum: 0.797 (12-31-23)    ASSESSMENT / RECOMMENDATIONS  Ms. Katz is a 43-year-old female with a past medical history of uncontrolled type 2 diabetes mellitus, hypertension, hyperlipidemia, CVA (11/2021 with left sided residual weakness), pulmonary embolism (4/2023, on eliquis), coronary artery disease (PCI s/p NAT 7/2027 and 8/2027), history of abdominal necrotizing fasciitis (ostomy placement in 11/2021), multiple abdominal hernia repairs, recurrent UTI and previous admission for left labial abscess (8/2023) who presented to the emergency department complaining of left groin swelling that progressively increased over the previous 3 weeks. She was admitted to the MICU for further management of hyperglycemia, and was placed on an insulin infusion overnight with improvement of glucose levels. She patient was transitioned to a basal-bolus regimen with Lantus 50 units and Admelog 20 units three times daily before meals this morning. However, her glucose has continued to increase back to the 300s mg/dL despite above regimen. Endocrinology was consulted for recommendations regarding diabetes management.     A1C: 16.4 %  BUN: 10  Creatinine: 0.61  GFR: 114  Weight: 99.8    # Type 2 diabetes mellitus with hyperglycemia  - Please continue lantus *** units at bedtime.   - Continue lispro *** units before each meal.  - Continue lispro moderate / low dose sliding scale before meals and at bedtime.  - Patient's fingerstick glucose goal is 100-180 mg/dL.    - Discharge recommendations to be discussed.   - Patient can follow up at discharge with [facility] by calling [phone] to make an appointment.      Case discussed with Dr. Coyle. Primary team updated.       Gerald Qiu    Endocrinology Fellow    Service Pager: [phone]  HISTORY OF PRESENT ILLNESS  Rachel Katz is a 43-year-old female with a past medical history of uncontrolled type 2 diabetes mellitus, hypertension, hyperlipidemia, CVA (11/2021 with left sided residual weakness), pulmonary embolism (4/2023, on eliquis), coronary artery disease (PCI s/p NAT 7/2027 and 8/2027), history of abdominal necrotizing fasciitis (ostomy placement in 11/2021), multiple abdominal hernia repairs, recurrent UTI and previous admission for left labial abscess (8/2023) who presented to the emergency department complaining of left groin swelling that progressively increased over the previous 3 weeks.     In the emergency department, her labs were remarkable for elevated ESR (42 mm/hr), CRP (14.8 mg/L), hyperglycemia (654 mg/dL) and hypokalemia (2.8 mmol/L). Her anion gap was elevated to 20, but likely due to underlying lactic acidosis (lactic acid 6.9 mmol/L) as beta-hydroxybutyrate was within normal limits (0.4 mmol/L). Urinalysis showed WBC 24, with moderate bacteria, small leukocyte esterase and positive yeast-like cells. CT abdomen and pelvis was remarkable for a 1.2 cm left adrenal nodule (unchanged from previous imaging) without abscess of labia (s/p I&D of left labia abscess during last admission on 8/2023). She received 1 gram of ertapenem for possible cellulitis along with 125 mg of solumedrol.     The patient was admitted to the MICU for further management of hyperglycemia and cellulitis and was placed on an insulin infusion overnight with improvement of glucose levels. This morning, she was transitioned to a basal-bolus regimen with Lantus 50 units and Admelog 20 units three times daily before meals this morning. However, her glucose has continued to increase back to the 300s mg/dL despite above regimen. Endocrinology was consulted for recommendations regarding diabetes management.     CAPILLARY BLOOD GLUCOSE & INSULIN RECEIVED  >600 mg/dL (01-01 @ 01:35) -> Insulin infusion at 4 units/hr.  558 mg/dL (01-01 @ 02:28) -> Insulin infusion at 8 units/hr.  453 mg/dL (01-01 @ 03:35) -> Insulin infusion at 12 units/hr.  404 mg/dL (01-01 @ 04:36) -> Insulin infusion at 16 units/hr.  268 mg/dL (01-01 @ 05:32) -> Insulin infusion at 20 units/hr.  213 mg/dL (01-01 @ 06:18) -> Insulin infusion at 16 units/hr.  138 mg/dL (01-01 @ 07:26) -> Insulin infusion at 12 units/hr.  142 mg/dL (01-01 @ 08:30) -> ?  160 mg/dL (01-01 @ 09:28) -> ?  179 mg/dL (01-01 @ 10:31) -> 50 units of lantus + 20 units of lispro + 2 units of lispro sliding scale.  239 mg/dL (01-01 @ 11:35) -> Ø  310 mg/dL (01-01 @ 12:47) -> Ø  363 mg/dL (01-01 @ 13:36) -> Ø  414 mg/dL (01-01 @ 14:32) -> 12 units of lispro sliding scale.     DIABETES HISTORY  The patient is known to the endocrine service for multiple previous admissions, last seen on 8/2023. She was diagnosed with type 2 diabetes mellitus in 2005. She was started on metformin 1,000 mg twice daily on 2007. In 2009, she was pregnant and was started on glyburide (did not require insulin for pregnancy). On October 2020, she began seeing Dr. Joseph and was eventually transitioned to insulin followed by Humulin U500. During her last admission she was managed with Lantus 50 twice daily and lispro 25 three times daily before meals with labile glucose levels ranging from  mg/dL. She was discharged back on home Humalin U500 60 units three times daily and admelog 26 units three times daily with meals. Since then, she has self increased her doses to humulin U500 80 units three times daily and Admelog 36 units three times daily before meals; however, she sometimes increases the doses of admelog if her glucose is too high. Denied having hypoglycemic episodes. She reports that her fasting glucose is usually in the 200-300 mg/dL range and that it continues to increase throught the day up to 600s mg/dL. She says that her weight has been going up and down. Her last eye exam was >2 years ago, and says that she was told she had diabetic retinopathy. She also has neuropathy symptoms (i.e., numbness and tingling in her feet). She reports that she has been experiencing nausea and vomiting over the past month and that she has been mostly drinking liquids. She has been skipping most of the meals and says that she keeps drinking water or juice (e.g., tamarind or parcha). She is following with Dr. Joseph (Endocrinologist) outpatient; however, the last visit with her was document in February 2023.     PAST MEDICAL & SURGICAL HISTORY  As per history of present illness.     ALLERGIES  iodine containing compounds (Hives; Anaphylaxis)  clindamycin (Pruritus)  fish (Hives; Urticaria)  vancomycin (Anaphylaxis; Hives)  penicillin (Hives)  shellfish. (Hives; Anaphylaxis)  amoxicillin (Short breath; Rash)  Bactrim I.V. (Rash (Mod to Severe))    CURRENT MEDICATIONS  apixaban 5 milliGRAM(s) Oral every 12 hours  atorvastatin 80 milliGRAM(s) Oral at bedtime  chlorhexidine 2% Cloths 1 Application(s) Topical <User Schedule>  clopidogrel Tablet 75 milliGRAM(s) Oral daily  clotrimazole 1% Cream 1 Application(s) Topical two times a day  dextrose 5%. 1000 milliLiter(s) IV Continuous <Continuous>  dextrose 5%. 1000 milliLiter(s) IV Continuous <Continuous>  dextrose 50% Injectable 25 Gram(s) IV Push once  dextrose 50% Injectable 12.5 Gram(s) IV Push once  dextrose 50% Injectable 25 Gram(s) IV Push once  dextrose Oral Gel 15 Gram(s) Oral once PRN  glucagon  Injectable 1 milliGRAM(s) IntraMuscular once  insulin glargine Injectable (LANTUS) 50 Unit(s) SubCutaneous every morning  insulin lispro (ADMELOG) corrective regimen sliding scale   SubCutaneous three times a day before meals  insulin lispro Injectable (ADMELOG) 20 Unit(s) SubCutaneous three times a day before meals  insulin regular Infusion 1 Unit(s)/Hr IV Continuous <Continuous>  metoprolol succinate ER 50 milliGRAM(s) Oral daily  oxyCODONE    IR 10 milliGRAM(s) Oral every 4 hours PRN  pantoprazole    Tablet 40 milliGRAM(s) Oral every 24 hours  sodium chloride 0.9% 1000 milliLiter(s) IV Continuous <Continuous>    REVIEW OF SYSTEMS  Constitutional:  Negative fever, chills or loss of appetite.  Cardiovascular:  Negative for chest pain or palpitations.  Respiratory:  Negative for cough, wheezing, or shortness of breath.   Gastrointestinal:  Negative for nausea, vomiting, diarrhea, constipation, or abdominal pain.  Genitourinary:  (+) erythema, burning and pruritus of vulva. Negative frequency, urgency or dysuria.  Neurologic:  No headache, confusion, dizziness, lightheadedness.    PHYSICAL EXAM  Vital Signs Last 24 Hrs  T(C): 35.4 (01 Jan 2024 09:44), Max: 36.8 (31 Dec 2023 22:34)  T(F): 95.8 (01 Jan 2024 09:44), Max: 98.3 (31 Dec 2023 22:34)  HR: 99 (01 Jan 2024 14:00) (67 - 106)  BP: 126/66 (01 Jan 2024 14:00) (119/71 - 193/95)  BP(mean): 90 (01 Jan 2024 14:00) (89 - 136)  RR: 26 (01 Jan 2024 14:00) (18 - 39)  SpO2: 99% (01 Jan 2024 14:00) (95% - 117%)    Parameters below as of 01 Jan 2024 14:00  Patient On (Oxygen Delivery Method): room air    Constitutional: Awake, alert, in no acute distress.   HEENT: Normocephalic, atraumatic, NAOMI, no proptosis or lid retraction.   Neck: supple, no acanthosis, no thyromegaly or palpable thyroid nodules.  Respiratory: Lungs clear to ausculation bilaterally.   Cardiovascular: regular rhythm, normal S1 and S2, no audible murmurs.   GI: soft, non-tender, non-distended, bowel sounds present, no masses appreciated.  Extremities: No lower extremity edema, peripheral pulses present.   Skin: no rashes.   Psychiatric: AAO x 3. Normal affect/mood.     LABS  CBC - WBC/HGB/HTC/PLT: 12.87/12.8/41.7/318 (01-01-24)  BMP: Na/K/Cl/Bicarb/BUN/Cr/Gluc: 130/4.2/96/16/10/0.61/420 (01-01-24)  Anion Gap: 18 (01-01-24)  eGFR: 114 (01-01-24)  Calcium: 8.3 (01-01-24)  Phosphorus: 1.3 (01-01-24)  Magnesium: 2.3 (01-01-24)  LFT - Alb/Tprot/Tbili/Dbili/AlkPhos/ALT/AST: 3.5/--/0.3/0.2/187/15/18 (12-31-23)  PT/aPTT/INR: 11.5/28.8/1.01 (12-31-23)  Thyroid Stimulating Hormone, Serum: 0.797 (12-31-23)    ASSESSMENT / RECOMMENDATIONS  Ms. Katz is a 43-year-old female with a past medical history of uncontrolled type 2 diabetes mellitus, hypertension, hyperlipidemia, CVA (11/2021 with left sided residual weakness), pulmonary embolism (4/2023, on eliquis), coronary artery disease (PCI s/p NAT 7/2027 and 8/2027), history of abdominal necrotizing fasciitis (ostomy placement in 11/2021), multiple abdominal hernia repairs, recurrent UTI and previous admission for left labial abscess (8/2023) who presented to the emergency department complaining of left groin swelling that progressively increased over the previous 3 weeks. She was admitted to the MICU for further management of hyperglycemia, and was placed on an insulin infusion overnight with improvement of glucose levels. She patient was transitioned to a basal-bolus regimen with Lantus 50 units and Admelog 20 units three times daily before meals this morning. However, her glucose has continued to increase back to the 300s mg/dL despite above regimen. Endocrinology was consulted for recommendations regarding diabetes management.     A1C: 16.4 %  BUN: 10  Creatinine: 0.61  GFR: 114  Weight: 99.8    # Type 2 diabetes mellitus with hyperglycemia  - Please continue lantus *** units at bedtime.   - Continue lispro *** units before each meal.  - Continue lispro moderate / low dose sliding scale before meals and at bedtime.  - Patient's fingerstick glucose goal is 100-180 mg/dL.    - Discharge recommendations to be discussed.   - Patient can follow up at discharge with [facility] by calling [phone] to make an appointment.      Case discussed with Dr. Coyle. Primary team updated.       Gerald Qiu    Endocrinology Fellow    Service Pager: [phone]  HISTORY OF PRESENT ILLNESS  Rachel Katz is a 43-year-old female with a past medical history of uncontrolled type 2 diabetes mellitus, hypertension, hyperlipidemia, CVA (11/2021 with left sided residual weakness), pulmonary embolism (4/2023, on eliquis), coronary artery disease (PCI s/p NAT 7/2027 and 8/2027), history of abdominal necrotizing fasciitis (ostomy placement in 11/2021), multiple abdominal hernia repairs, recurrent UTI and previous admission for left labial abscess (8/2023) who presented to the emergency department complaining of left groin swelling that progressively increased over the previous 3 weeks.     In the emergency department, her labs were remarkable for elevated ESR (42 mm/hr), CRP (14.8 mg/L), hyperglycemia (654 mg/dL) and hypokalemia (2.8 mmol/L). Her anion gap was elevated to 20, but likely due to underlying lactic acidosis (lactic acid 6.9 mmol/L) as beta-hydroxybutyrate was within normal limits (0.4 mmol/L). Urinalysis showed WBC 24, with moderate bacteria, small leukocyte esterase and positive yeast-like cells. CT abdomen and pelvis was remarkable for a 1.2 cm left adrenal nodule (unchanged from previous imaging) without abscess of labia (s/p I&D of left labia abscess during last admission on 8/2023). She received 1 gram of ertapenem for possible cellulitis along with 125 mg of solumedrol.     The patient was admitted to the MICU for further management of hyperglycemia and cellulitis and was placed on an insulin infusion overnight with improvement of glucose levels. This morning, she was transitioned to a basal-bolus regimen with Lantus 50 units and Admelog 20 units three times daily before meals this morning. However, her glucose has continued to increase back to the 300s mg/dL despite above regimen. Endocrinology was consulted for recommendations regarding diabetes management.     CAPILLARY BLOOD GLUCOSE & INSULIN RECEIVED  >600 mg/dL (01-01 @ 01:35) -> Insulin infusion at 4 units/hr.  558 mg/dL (01-01 @ 02:28) -> Insulin infusion at 8 units/hr.  453 mg/dL (01-01 @ 03:35) -> Insulin infusion at 12 units/hr.  404 mg/dL (01-01 @ 04:36) -> Insulin infusion at 16 units/hr.  268 mg/dL (01-01 @ 05:32) -> Insulin infusion at 20 units/hr.  213 mg/dL (01-01 @ 06:18) -> Insulin infusion at 16 units/hr.  138 mg/dL (01-01 @ 07:26) -> Insulin infusion at 12 units/hr.  142 mg/dL (01-01 @ 08:30) -> ?  160 mg/dL (01-01 @ 09:28) -> ?  179 mg/dL (01-01 @ 10:31) -> 50 units of lantus + 20 units of lispro + 2 units of lispro sliding scale.  239 mg/dL (01-01 @ 11:35) -> Ø  310 mg/dL (01-01 @ 12:47) -> Ø  363 mg/dL (01-01 @ 13:36) -> Ø  414 mg/dL (01-01 @ 14:32) -> 12 units of lispro sliding scale.     DIABETES HISTORY  The patient is known to the endocrine service for multiple previous admissions, last seen on 8/2023. She was diagnosed with type 2 diabetes mellitus in 2005. She was started on metformin 1,000 mg twice daily on 2007. In 2009, she was pregnant and was started on glyburide (did not require insulin for pregnancy). On October 2020, she began seeing Dr. Joseph and was eventually transitioned to insulin followed by Humulin U500. During her last admission she was managed with Lantus 50 twice daily and lispro 25 three times daily before meals with labile glucose levels ranging from  mg/dL. She was discharged back on home Humalin U500 60 units three times daily and admelog 26 units three times daily with meals. Since then, she has self increased her doses to humulin U500 80 units three times daily and Admelog 36 units three times daily before meals; however, she sometimes increases the doses of admelog if her glucose is too high. Denied having hypoglycemic episodes. She reports that her fasting glucose is usually in the 200-300 mg/dL range and that it continues to increase throught the day up to 600s mg/dL. She says that her weight has been going up and down. Her last eye exam was >2 years ago, and says that she was told she had diabetic retinopathy. She also has neuropathy symptoms (i.e., numbness and tingling in her feet). She reports that she has been experiencing nausea and vomiting over the past month and that she has been mostly drinking liquids. She has been skipping most of the meals and says that she keeps drinking water or juice (e.g., tamarind or parcha). She is following with Dr. Joseph (Endocrinologist) outpatient; however, the last visit with her was document in February 2023.     PAST MEDICAL & SURGICAL HISTORY  As per history of present illness.     ALLERGIES  iodine containing compounds (Hives; Anaphylaxis)  clindamycin (Pruritus)  fish (Hives; Urticaria)  vancomycin (Anaphylaxis; Hives)  penicillin (Hives)  shellfish. (Hives; Anaphylaxis)  amoxicillin (Short breath; Rash)  Bactrim I.V. (Rash (Mod to Severe))    CURRENT MEDICATIONS  apixaban 5 milliGRAM(s) Oral every 12 hours  atorvastatin 80 milliGRAM(s) Oral at bedtime  chlorhexidine 2% Cloths 1 Application(s) Topical <User Schedule>  clopidogrel Tablet 75 milliGRAM(s) Oral daily  clotrimazole 1% Cream 1 Application(s) Topical two times a day  dextrose 5%. 1000 milliLiter(s) IV Continuous <Continuous>  dextrose 5%. 1000 milliLiter(s) IV Continuous <Continuous>  dextrose 50% Injectable 25 Gram(s) IV Push once  dextrose 50% Injectable 12.5 Gram(s) IV Push once  dextrose 50% Injectable 25 Gram(s) IV Push once  dextrose Oral Gel 15 Gram(s) Oral once PRN  glucagon  Injectable 1 milliGRAM(s) IntraMuscular once  insulin glargine Injectable (LANTUS) 50 Unit(s) SubCutaneous every morning  insulin lispro (ADMELOG) corrective regimen sliding scale   SubCutaneous three times a day before meals  insulin lispro Injectable (ADMELOG) 20 Unit(s) SubCutaneous three times a day before meals  insulin regular Infusion 1 Unit(s)/Hr IV Continuous <Continuous>  metoprolol succinate ER 50 milliGRAM(s) Oral daily  oxyCODONE    IR 10 milliGRAM(s) Oral every 4 hours PRN  pantoprazole    Tablet 40 milliGRAM(s) Oral every 24 hours  sodium chloride 0.9% 1000 milliLiter(s) IV Continuous <Continuous>    REVIEW OF SYSTEMS  Constitutional:  Negative fever, chills or loss of appetite.  Cardiovascular:  Negative for chest pain or palpitations.  Respiratory:  Negative for cough, wheezing, or shortness of breath.   Gastrointestinal:  Negative for nausea, vomiting, diarrhea, constipation, or abdominal pain.  Genitourinary:  (+) erythema, burning and pruritus of vulva. Negative frequency, urgency or dysuria.  Neurologic:  No headache, confusion, dizziness, lightheadedness.    PHYSICAL EXAM  Vital Signs Last 24 Hrs  T(C): 35.4 (01 Jan 2024 09:44), Max: 36.8 (31 Dec 2023 22:34)  T(F): 95.8 (01 Jan 2024 09:44), Max: 98.3 (31 Dec 2023 22:34)  HR: 99 (01 Jan 2024 14:00) (67 - 106)  BP: 126/66 (01 Jan 2024 14:00) (119/71 - 193/95)  BP(mean): 90 (01 Jan 2024 14:00) (89 - 136)  RR: 26 (01 Jan 2024 14:00) (18 - 39)  SpO2: 99% (01 Jan 2024 14:00) (95% - 117%)    Parameters below as of 01 Jan 2024 14:00  Patient On (Oxygen Delivery Method): room air    Constitutional: Awake, alert, in no acute distress.   HEENT: Normocephalic, atraumatic, NAOMI, no proptosis or lid retraction.   Neck: supple, no acanthosis, no thyromegaly or palpable thyroid nodules.  Respiratory: Lungs clear to ausculation bilaterally.   Cardiovascular: regular rhythm, normal S1 and S2, no audible murmurs.   GI: soft, non-tender, non-distended, bowel sounds present, no masses appreciated.  Extremities: No lower extremity edema, peripheral pulses present.   Skin: no rashes.   Psychiatric: AAO x 3. Normal affect/mood.     LABS  CBC - WBC/HGB/HTC/PLT: 12.87/12.8/41.7/318 (01-01-24)  BMP: Na/K/Cl/Bicarb/BUN/Cr/Gluc: 130/4.2/96/16/10/0.61/420 (01-01-24)  Anion Gap: 18 (01-01-24)  eGFR: 114 (01-01-24)  Calcium: 8.3 (01-01-24)  Phosphorus: 1.3 (01-01-24)  Magnesium: 2.3 (01-01-24)  LFT - Alb/Tprot/Tbili/Dbili/AlkPhos/ALT/AST: 3.5/--/0.3/0.2/187/15/18 (12-31-23)  PT/aPTT/INR: 11.5/28.8/1.01 (12-31-23)  Thyroid Stimulating Hormone, Serum: 0.797 (12-31-23)    ASSESSMENT / RECOMMENDATIONS  Ms. Katz is a 43-year-old female with a past medical history of uncontrolled type 2 diabetes mellitus, hypertension, hyperlipidemia, CVA (11/2021 with left sided residual weakness), pulmonary embolism (4/2023, on eliquis), coronary artery disease (PCI s/p NAT 7/2027 and 8/2027), history of abdominal necrotizing fasciitis (ostomy placement in 11/2021), multiple abdominal hernia repairs, recurrent UTI and previous admission for left labial abscess (8/2023) who presented to the emergency department complaining of left groin swelling that progressively increased over the previous 3 weeks. She was admitted to the MICU for further management of hyperglycemia, and was placed on an insulin infusion overnight with improvement of glucose levels. She patient was transitioned to a basal-bolus regimen with Lantus 50 units and Admelog 20 units three times daily before meals this morning. However, her glucose has continued to increase back to the 300s mg/dL despite above regimen. Endocrinology was consulted for recommendations regarding diabetes management.     A1C: 16.4 %  BUN: 10  Creatinine: 0.61  GFR: 114  Weight: 99.8    # Type 2 diabetes mellitus with hyperglycemia  - Please continue lantus *** units at bedtime.   - Continue lispro *** units before each meal.  - Continue lispro moderate / low dose sliding scale before meals and at bedtime.  - Patient's fingerstick glucose goal is 100-180 mg/dL.    - Discharge recommendations to be discussed.   - Patient can follow up at discharge with [facility] by calling [phone] to make an appointment.      Case discussed with Dr. Coyle. Primary team updated.       Gerald Qiu    Endocrinology Fellow    Service Pager: [phone]  HISTORY OF PRESENT ILLNESS  Rachel Katz is a 43-year-old female with a past medical history of uncontrolled type 2 diabetes mellitus, hypertension, hyperlipidemia, CVA (11/2021 with left sided residual weakness), pulmonary embolism (4/2023, on eliquis), coronary artery disease (PCI s/p NAT 7/2027 and 8/2027), history of abdominal necrotizing fasciitis (ostomy placement in 11/2021), multiple abdominal hernia repairs, recurrent UTI and previous admission for left labial abscess (8/2023) who presented to the emergency department complaining of left groin swelling that progressively increased over the previous 3 weeks.     In the emergency department, her labs were remarkable for elevated ESR (42 mm/hr), CRP (14.8 mg/L), hyperglycemia (654 mg/dL) and hypokalemia (2.8 mmol/L). Her anion gap was elevated to 20, but likely due to underlying lactic acidosis (lactic acid 6.9 mmol/L) as beta-hydroxybutyrate was within normal limits (0.4 mmol/L). Urinalysis showed WBC 24, with moderate bacteria, small leukocyte esterase and positive yeast-like cells. CT abdomen and pelvis was remarkable for a 1.2 cm left adrenal nodule (unchanged from previous imaging) without abscess of labia (s/p I&D of left labia abscess during last admission on 8/2023). She received 1 gram of ertapenem for possible cellulitis along with 125 mg of solumedrol.     The patient was admitted to the MICU for further management of hyperglycemia and cellulitis and was placed on an insulin infusion overnight with improvement of glucose levels. This morning, she was transitioned to a basal-bolus regimen with Lantus 50 units and Admelog 20 units three times daily before meals this morning. However, her glucose has continued to increase back to the 300s mg/dL despite above regimen. Endocrinology was consulted for recommendations regarding diabetes management.     CAPILLARY BLOOD GLUCOSE & INSULIN RECEIVED  >600 mg/dL (01-01 @ 01:35) -> Insulin infusion at 4 units/hr.  558 mg/dL (01-01 @ 02:28) -> Insulin infusion at 8 units/hr.  453 mg/dL (01-01 @ 03:35) -> Insulin infusion at 12 units/hr.  404 mg/dL (01-01 @ 04:36) -> Insulin infusion at 16 units/hr.  268 mg/dL (01-01 @ 05:32) -> Insulin infusion at 20 units/hr.  213 mg/dL (01-01 @ 06:18) -> Insulin infusion at 16 units/hr.  138 mg/dL (01-01 @ 07:26) -> Insulin infusion at 12 units/hr.  142 mg/dL (01-01 @ 08:30) -> ?  160 mg/dL (01-01 @ 09:28) -> ?  179 mg/dL (01-01 @ 10:31) -> 50 units of lantus + 20 units of lispro + 2 units of lispro sliding scale.  239 mg/dL (01-01 @ 11:35) -> Ø  310 mg/dL (01-01 @ 12:47) -> Ø  363 mg/dL (01-01 @ 13:36) -> Ø  414 mg/dL (01-01 @ 14:32) -> 12 units of lispro sliding scale.     DIABETES HISTORY  The patient is known to the endocrine service for multiple previous admissions, last seen on 8/2023. She was diagnosed with type 2 diabetes mellitus in 2005. She was started on metformin 1,000 mg twice daily on 2007. In 2009, she was pregnant and was started on glyburide (did not require insulin for pregnancy). On October 2020, she began seeing Dr. Joseph and was eventually transitioned to insulin followed by Humulin U500. During her last admission she was managed with Lantus 50 twice daily and lispro 25 three times daily before meals with labile glucose levels ranging from  mg/dL. She was discharged back on home Humalin U500 60 units three times daily and admelog 26 units three times daily with meals. Since then, she has self increased her doses to humulin U500 80 units three times daily and Admelog 36 units three times daily before meals; however, she sometimes increases the doses of admelog if her glucose is too high. Denied having hypoglycemic episodes. She reports that her fasting glucose is usually in the 200-300 mg/dL range and that it continues to increase throught the day up to 600s mg/dL. She says that her weight has been going up and down. Her last eye exam was >2 years ago, and says that she was told she had diabetic retinopathy. She also has neuropathy symptoms (i.e., numbness and tingling in her feet). She reports that she has been experiencing nausea and vomiting over the past month and that she has been mostly drinking liquids. She has been skipping most of the meals and says that she keeps drinking water or juice (e.g., tamarind or parcha). She is following with Dr. Joseph (Endocrinologist) outpatient; however, the last visit with her was document in February 2023.     PAST MEDICAL & SURGICAL HISTORY  As per history of present illness.     ALLERGIES  iodine containing compounds (Hives; Anaphylaxis)  clindamycin (Pruritus)  fish (Hives; Urticaria)  vancomycin (Anaphylaxis; Hives)  penicillin (Hives)  shellfish. (Hives; Anaphylaxis)  amoxicillin (Short breath; Rash)  Bactrim I.V. (Rash (Mod to Severe))    CURRENT MEDICATIONS  apixaban 5 milliGRAM(s) Oral every 12 hours  atorvastatin 80 milliGRAM(s) Oral at bedtime  chlorhexidine 2% Cloths 1 Application(s) Topical <User Schedule>  clopidogrel Tablet 75 milliGRAM(s) Oral daily  clotrimazole 1% Cream 1 Application(s) Topical two times a day  dextrose 5%. 1000 milliLiter(s) IV Continuous <Continuous>  dextrose 5%. 1000 milliLiter(s) IV Continuous <Continuous>  dextrose 50% Injectable 25 Gram(s) IV Push once  dextrose 50% Injectable 12.5 Gram(s) IV Push once  dextrose 50% Injectable 25 Gram(s) IV Push once  dextrose Oral Gel 15 Gram(s) Oral once PRN  glucagon  Injectable 1 milliGRAM(s) IntraMuscular once  insulin glargine Injectable (LANTUS) 50 Unit(s) SubCutaneous every morning  insulin lispro (ADMELOG) corrective regimen sliding scale   SubCutaneous three times a day before meals  insulin lispro Injectable (ADMELOG) 20 Unit(s) SubCutaneous three times a day before meals  insulin regular Infusion 1 Unit(s)/Hr IV Continuous <Continuous>  metoprolol succinate ER 50 milliGRAM(s) Oral daily  oxyCODONE    IR 10 milliGRAM(s) Oral every 4 hours PRN  pantoprazole    Tablet 40 milliGRAM(s) Oral every 24 hours  sodium chloride 0.9% 1000 milliLiter(s) IV Continuous <Continuous>    REVIEW OF SYSTEMS  Constitutional:  Negative fever, chills or loss of appetite.  Cardiovascular:  Negative for chest pain or palpitations.  Respiratory:  Negative for cough, wheezing, or shortness of breath.   Gastrointestinal:  Negative for nausea, vomiting, diarrhea, constipation, or abdominal pain.  Genitourinary:  (+) erythema, burning and pruritus of vulva. Negative frequency, urgency or dysuria.  Neurologic:  No headache, confusion, dizziness, lightheadedness.    PHYSICAL EXAM  Vital Signs Last 24 Hrs  T(C): 35.4 (01 Jan 2024 09:44), Max: 36.8 (31 Dec 2023 22:34)  T(F): 95.8 (01 Jan 2024 09:44), Max: 98.3 (31 Dec 2023 22:34)  HR: 99 (01 Jan 2024 14:00) (67 - 106)  BP: 126/66 (01 Jan 2024 14:00) (119/71 - 193/95)  BP(mean): 90 (01 Jan 2024 14:00) (89 - 136)  RR: 26 (01 Jan 2024 14:00) (18 - 39)  SpO2: 99% (01 Jan 2024 14:00) (95% - 117%)    Parameters below as of 01 Jan 2024 14:00  Patient On (Oxygen Delivery Method): room air    Constitutional: Awake, alert, in no acute distress.   HEENT: Normocephalic, atraumatic, NAOMI, no proptosis or lid retraction.   Neck: supple, no acanthosis, no thyromegaly or palpable thyroid nodules.  Respiratory: Lungs clear to ausculation bilaterally.   Cardiovascular: regular rhythm, normal S1 and S2, no audible murmurs.   GI: soft, non-tender, non-distended, bowel sounds present, no masses appreciated.  Extremities: No lower extremity edema, peripheral pulses present.   Skin: no rashes.   Psychiatric: AAO x 3. Normal affect/mood.     LABS  CBC - WBC/HGB/HTC/PLT: 12.87/12.8/41.7/318 (01-01-24)  BMP: Na/K/Cl/Bicarb/BUN/Cr/Gluc: 130/4.2/96/16/10/0.61/420 (01-01-24)  Anion Gap: 18 (01-01-24)  eGFR: 114 (01-01-24)  Calcium: 8.3 (01-01-24)  Phosphorus: 1.3 (01-01-24)  Magnesium: 2.3 (01-01-24)  LFT - Alb/Tprot/Tbili/Dbili/AlkPhos/ALT/AST: 3.5/--/0.3/0.2/187/15/18 (12-31-23)  PT/aPTT/INR: 11.5/28.8/1.01 (12-31-23)  Thyroid Stimulating Hormone, Serum: 0.797 (12-31-23)    ASSESSMENT / RECOMMENDATIONS  Ms. Katz is a 43-year-old female with a past medical history of uncontrolled type 2 diabetes mellitus, hypertension, hyperlipidemia, CVA (11/2021 with left sided residual weakness), pulmonary embolism (4/2023, on eliquis), coronary artery disease (PCI s/p NAT 7/2027 and 8/2027), history of abdominal necrotizing fasciitis (ostomy placement in 11/2021), multiple abdominal hernia repairs, recurrent UTI and previous admission for left labial abscess (8/2023) who presented to the emergency department complaining of left groin swelling that progressively increased over the previous 3 weeks. She was admitted to the MICU for further management of hyperglycemia, and was placed on an insulin infusion overnight with improvement of glucose levels. She patient was transitioned to a basal-bolus regimen with Lantus 50 units and Admelog 20 units three times daily before meals this morning. However, her glucose has continued to increase back to the 300s mg/dL despite above regimen. Endocrinology was consulted for recommendations regarding diabetes management.     A1C: 16.4 %  BUN: 10  Creatinine: 0.61  GFR: 114  Weight: 99.8    # Type 2 diabetes mellitus with hyperglycemia  - Please CHANGE lantus to NPH insulin 65 units every 8 hours (8AM, 4PM, 12AM).  - INCREASE lispro to 35 units three times daily before meals.   -Please write a customized order for Lispro insulin correctional scale as follows (FSG = Fingerstick glucose) to be given before meals:       > -200, give 3 units      > -250, give 6 units      > -300, give 9 units      > -350, give 12 units      > -400, give 15 units      > -450, give 18 units      >  and above, give 21 units   - Patient's fingerstick glucose goal is 100-180 mg/dL.    - Discharge recommendations to be discussed.   - Patient can follow up at discharge with Dr. Joseph at [facility] by calling [phone] to make an appointment.      Case discussed with Dr. Coyle. Primary team updated.       Gerald Qiu    Endocrinology Fellow    Service Pager: [phone]  HISTORY OF PRESENT ILLNESS  Rachel Katz is a 43-year-old female with a past medical history of uncontrolled type 2 diabetes mellitus, hypertension, hyperlipidemia, CVA (11/2021 with left sided residual weakness), pulmonary embolism (4/2023, on eliquis), coronary artery disease (PCI s/p NAT 7/2027 and 8/2027), history of abdominal necrotizing fasciitis (ostomy placement in 11/2021), multiple abdominal hernia repairs, recurrent UTI and previous admission for left labial abscess (8/2023) who presented to the emergency department complaining of left groin swelling that progressively increased over the previous 3 weeks.     In the emergency department, her labs were remarkable for elevated ESR (42 mm/hr), CRP (14.8 mg/L), hyperglycemia (654 mg/dL) and hypokalemia (2.8 mmol/L). Her anion gap was elevated to 20, but likely due to underlying lactic acidosis (lactic acid 6.9 mmol/L) as beta-hydroxybutyrate was within normal limits (0.4 mmol/L). Urinalysis showed WBC 24, with moderate bacteria, small leukocyte esterase and positive yeast-like cells. CT abdomen and pelvis was remarkable for a 1.2 cm left adrenal nodule (unchanged from previous imaging) without abscess of labia (s/p I&D of left labia abscess during last admission on 8/2023). She received 1 gram of ertapenem for possible cellulitis along with 125 mg of solumedrol.     The patient was admitted to the MICU for further management of hyperglycemia and cellulitis and was placed on an insulin infusion overnight with improvement of glucose levels. This morning, she was transitioned to a basal-bolus regimen with Lantus 50 units and Admelog 20 units three times daily before meals this morning. However, her glucose has continued to increase back to the 300s mg/dL despite above regimen. Endocrinology was consulted for recommendations regarding diabetes management.     CAPILLARY BLOOD GLUCOSE & INSULIN RECEIVED  >600 mg/dL (01-01 @ 01:35) -> Insulin infusion at 4 units/hr.  558 mg/dL (01-01 @ 02:28) -> Insulin infusion at 8 units/hr.  453 mg/dL (01-01 @ 03:35) -> Insulin infusion at 12 units/hr.  404 mg/dL (01-01 @ 04:36) -> Insulin infusion at 16 units/hr.  268 mg/dL (01-01 @ 05:32) -> Insulin infusion at 20 units/hr.  213 mg/dL (01-01 @ 06:18) -> Insulin infusion at 16 units/hr.  138 mg/dL (01-01 @ 07:26) -> Insulin infusion at 12 units/hr.  142 mg/dL (01-01 @ 08:30) -> ?  160 mg/dL (01-01 @ 09:28) -> ?  179 mg/dL (01-01 @ 10:31) -> 50 units of lantus + 20 units of lispro + 2 units of lispro sliding scale.  239 mg/dL (01-01 @ 11:35) -> Ø  310 mg/dL (01-01 @ 12:47) -> Ø  363 mg/dL (01-01 @ 13:36) -> Ø  414 mg/dL (01-01 @ 14:32) -> 12 units of lispro sliding scale.     DIABETES HISTORY  The patient is known to the endocrine service for multiple previous admissions, last seen on 8/2023. She was diagnosed with type 2 diabetes mellitus in 2005. She was started on metformin 1,000 mg twice daily on 2007. In 2009, she was pregnant and was started on glyburide (did not require insulin for pregnancy). On October 2020, she began seeing Dr. Joseph and was eventually transitioned to insulin followed by Humulin U500. During her last admission she was managed with Lantus 50 twice daily and lispro 25 three times daily before meals with labile glucose levels ranging from  mg/dL. She was discharged back on home Humalin U500 60 units three times daily and admelog 26 units three times daily with meals. Since then, she has self increased her doses to humulin U500 80 units three times daily and Admelog 36 units three times daily before meals; however, she sometimes increases the doses of admelog if her glucose is too high. Denied having hypoglycemic episodes. She reports that her fasting glucose is usually in the 200-300 mg/dL range and that it continues to increase throught the day up to 600s mg/dL. She says that her weight has been going up and down. Her last eye exam was >2 years ago, and says that she was told she had diabetic retinopathy. She also has neuropathy symptoms (i.e., numbness and tingling in her feet). She reports that she has been experiencing nausea and vomiting over the past month and that she has been mostly drinking liquids. She has been skipping most of the meals and says that she keeps drinking water or juice (e.g., tamarind or parcha). She is following with Dr. Joseph (Endocrinologist) outpatient; however, the last visit with her was document in February 2023.     PAST MEDICAL & SURGICAL HISTORY  As per history of present illness.     ALLERGIES  iodine containing compounds (Hives; Anaphylaxis)  clindamycin (Pruritus)  fish (Hives; Urticaria)  vancomycin (Anaphylaxis; Hives)  penicillin (Hives)  shellfish. (Hives; Anaphylaxis)  amoxicillin (Short breath; Rash)  Bactrim I.V. (Rash (Mod to Severe))    CURRENT MEDICATIONS  apixaban 5 milliGRAM(s) Oral every 12 hours  atorvastatin 80 milliGRAM(s) Oral at bedtime  chlorhexidine 2% Cloths 1 Application(s) Topical <User Schedule>  clopidogrel Tablet 75 milliGRAM(s) Oral daily  clotrimazole 1% Cream 1 Application(s) Topical two times a day  dextrose 5%. 1000 milliLiter(s) IV Continuous <Continuous>  dextrose 5%. 1000 milliLiter(s) IV Continuous <Continuous>  dextrose 50% Injectable 25 Gram(s) IV Push once  dextrose 50% Injectable 12.5 Gram(s) IV Push once  dextrose 50% Injectable 25 Gram(s) IV Push once  dextrose Oral Gel 15 Gram(s) Oral once PRN  glucagon  Injectable 1 milliGRAM(s) IntraMuscular once  insulin glargine Injectable (LANTUS) 50 Unit(s) SubCutaneous every morning  insulin lispro (ADMELOG) corrective regimen sliding scale   SubCutaneous three times a day before meals  insulin lispro Injectable (ADMELOG) 20 Unit(s) SubCutaneous three times a day before meals  insulin regular Infusion 1 Unit(s)/Hr IV Continuous <Continuous>  metoprolol succinate ER 50 milliGRAM(s) Oral daily  oxyCODONE    IR 10 milliGRAM(s) Oral every 4 hours PRN  pantoprazole    Tablet 40 milliGRAM(s) Oral every 24 hours  sodium chloride 0.9% 1000 milliLiter(s) IV Continuous <Continuous>    REVIEW OF SYSTEMS  Constitutional:  Negative fever, chills or loss of appetite.  Cardiovascular:  Negative for chest pain or palpitations.  Respiratory:  Negative for cough, wheezing, or shortness of breath.   Gastrointestinal:  Negative for nausea, vomiting, diarrhea, constipation, or abdominal pain.  Genitourinary:  (+) erythema, burning and pruritus of vulva. Negative frequency, urgency or dysuria.  Neurologic:  No headache, confusion, dizziness, lightheadedness.    PHYSICAL EXAM  Vital Signs Last 24 Hrs  T(C): 35.4 (01 Jan 2024 09:44), Max: 36.8 (31 Dec 2023 22:34)  T(F): 95.8 (01 Jan 2024 09:44), Max: 98.3 (31 Dec 2023 22:34)  HR: 99 (01 Jan 2024 14:00) (67 - 106)  BP: 126/66 (01 Jan 2024 14:00) (119/71 - 193/95)  BP(mean): 90 (01 Jan 2024 14:00) (89 - 136)  RR: 26 (01 Jan 2024 14:00) (18 - 39)  SpO2: 99% (01 Jan 2024 14:00) (95% - 117%)    Parameters below as of 01 Jan 2024 14:00  Patient On (Oxygen Delivery Method): room air    Constitutional: Awake, alert, in no acute distress.   HEENT: Normocephalic, atraumatic, NAOMI, no proptosis or lid retraction.   Neck: supple, no acanthosis, no thyromegaly or palpable thyroid nodules.  Respiratory: Lungs clear to ausculation bilaterally.   Cardiovascular: regular rhythm, normal S1 and S2, no audible murmurs.   GI: soft, non-tender, non-distended, bowel sounds present, no masses appreciated.  Extremities: No lower extremity edema, peripheral pulses present.   Skin: no rashes.   Psychiatric: AAO x 3. Normal affect/mood.     LABS  CBC - WBC/HGB/HTC/PLT: 12.87/12.8/41.7/318 (01-01-24)  BMP: Na/K/Cl/Bicarb/BUN/Cr/Gluc: 130/4.2/96/16/10/0.61/420 (01-01-24)  Anion Gap: 18 (01-01-24)  eGFR: 114 (01-01-24)  Calcium: 8.3 (01-01-24)  Phosphorus: 1.3 (01-01-24)  Magnesium: 2.3 (01-01-24)  LFT - Alb/Tprot/Tbili/Dbili/AlkPhos/ALT/AST: 3.5/--/0.3/0.2/187/15/18 (12-31-23)  PT/aPTT/INR: 11.5/28.8/1.01 (12-31-23)  Thyroid Stimulating Hormone, Serum: 0.797 (12-31-23)    ASSESSMENT / RECOMMENDATIONS  Ms. Katz is a 43-year-old female with a past medical history of uncontrolled type 2 diabetes mellitus, hypertension, hyperlipidemia, CVA (11/2021 with left sided residual weakness), pulmonary embolism (4/2023, on eliquis), coronary artery disease (PCI s/p NAT 7/2027 and 8/2027), history of abdominal necrotizing fasciitis (ostomy placement in 11/2021), multiple abdominal hernia repairs, recurrent UTI and previous admission for left labial abscess (8/2023) who presented to the emergency department complaining of left groin swelling that progressively increased over the previous 3 weeks. She was admitted to the MICU for further management of hyperglycemia, and was placed on an insulin infusion overnight with improvement of glucose levels. She patient was transitioned to a basal-bolus regimen with Lantus 50 units and Admelog 20 units three times daily before meals this morning. However, her glucose has continued to increase back to the 300s mg/dL despite above regimen. Endocrinology was consulted for recommendations regarding diabetes management.     A1C: 16.4 %  BUN: 10  Creatinine: 0.61  GFR: 114  Weight: 99.8    # Type 2 diabetes mellitus with hyperglycemia  - Please CHANGE lantus to NPH insulin 65 units every 8 hours (8AM, 4PM, 12AM).  - INCREASE lispro to 35 units three times daily before meals.   -Please write a customized order for Lispro insulin correctional scale as follows (FSG = Fingerstick glucose) to be given before meals:       > -200, give 3 units      > -250, give 6 units      > -300, give 9 units      > -350, give 12 units      > -400, give 15 units      > -450, give 18 units      >  and above, give 21 units   - Patient's fingerstick glucose goal is 100-180 mg/dL.    - Discharge recommendations to be discussed.   - Patient can follow up at discharge with Dr. Joseph at [facility] by calling [phone] to make an appointment.      Case discussed with Dr. Coyle. Primary team updated.       Gerald Qiu    Endocrinology Fellow    Service Pager: [phone]  HISTORY OF PRESENT ILLNESS  Rachel Katz is a 43-year-old female with a past medical history of uncontrolled type 2 diabetes mellitus, hypertension, hyperlipidemia, CVA (11/2021 with left sided residual weakness), pulmonary embolism (4/2023, on eliquis), coronary artery disease (PCI s/p NAT 7/2027 and 8/2027), history of abdominal necrotizing fasciitis (ostomy placement in 11/2021), multiple abdominal hernia repairs, recurrent UTI and previous admission for left labial abscess (8/2023) who presented to the emergency department complaining of left groin swelling that progressively increased over the previous 3 weeks.     In the emergency department, her labs were remarkable for elevated ESR (42 mm/hr), CRP (14.8 mg/L), hyperglycemia (654 mg/dL) and hypokalemia (2.8 mmol/L). Her anion gap was elevated to 20, but likely due to underlying lactic acidosis (lactic acid 6.9 mmol/L) as beta-hydroxybutyrate was within normal limits (0.4 mmol/L). Urinalysis showed WBC 24, with moderate bacteria, small leukocyte esterase and positive yeast-like cells. CT abdomen and pelvis was remarkable for a 1.2 cm left adrenal nodule (unchanged from previous imaging) without abscess of labia (s/p I&D of left labia abscess during last admission on 8/2023). She received 1 gram of ertapenem for possible cellulitis along with 125 mg of solumedrol.     The patient was admitted to the MICU for further management of hyperglycemia and cellulitis and was placed on an insulin infusion overnight with improvement of glucose levels. This morning, she was transitioned to a basal-bolus regimen with Lantus 50 units and Admelog 20 units three times daily before meals this morning. However, her glucose has continued to increase back to the 300s mg/dL despite above regimen. Endocrinology was consulted for recommendations regarding diabetes management.     CAPILLARY BLOOD GLUCOSE & INSULIN RECEIVED  >600 mg/dL (01-01 @ 01:35) -> Insulin infusion at 4 units/hr.  558 mg/dL (01-01 @ 02:28) -> Insulin infusion at 8 units/hr.  453 mg/dL (01-01 @ 03:35) -> Insulin infusion at 12 units/hr.  404 mg/dL (01-01 @ 04:36) -> Insulin infusion at 16 units/hr.  268 mg/dL (01-01 @ 05:32) -> Insulin infusion at 20 units/hr.  213 mg/dL (01-01 @ 06:18) -> Insulin infusion at 16 units/hr.  138 mg/dL (01-01 @ 07:26) -> Insulin infusion at 12 units/hr.  142 mg/dL (01-01 @ 08:30) -> ?  160 mg/dL (01-01 @ 09:28) -> ?  179 mg/dL (01-01 @ 10:31) -> 50 units of lantus + 20 units of lispro + 2 units of lispro sliding scale.  239 mg/dL (01-01 @ 11:35) -> Ø  310 mg/dL (01-01 @ 12:47) -> Ø  363 mg/dL (01-01 @ 13:36) -> Ø  414 mg/dL (01-01 @ 14:32) -> 12 units of lispro sliding scale.     DIABETES HISTORY  The patient is known to the endocrine service for multiple previous admissions, last seen on 8/2023. She was diagnosed with type 2 diabetes mellitus in 2005. She was started on metformin 1,000 mg twice daily on 2007. In 2009, she was pregnant and was started on glyburide (did not require insulin for pregnancy). On October 2020, she began seeing Dr. Joseph and was eventually transitioned to insulin followed by Humulin U500. During her last admission she was managed with Lantus 50 twice daily and lispro 25 three times daily before meals with labile glucose levels ranging from  mg/dL. She was discharged back on home Humalin U500 60 units three times daily and admelog 26 units three times daily with meals. Since then, she has self increased her doses to humulin U500 80 units three times daily and Admelog 36 units three times daily before meals; however, she sometimes increases the doses of admelog if her glucose is too high. Denied having hypoglycemic episodes. She reports that her fasting glucose is usually in the 200-300 mg/dL range and that it continues to increase throught the day up to 600s mg/dL. She says that her weight has been going up and down. Her last eye exam was >2 years ago, and says that she was told she had diabetic retinopathy. She also has neuropathy symptoms (i.e., numbness and tingling in her feet). She reports that she has been experiencing nausea and vomiting over the past month and that she has been mostly drinking liquids. She has been skipping most of the meals and says that she keeps drinking water or juice (e.g., tamarind or parcha). She is following with Dr. Joseph (Endocrinologist) outpatient; however, the last visit with her was document in February 2023.     PAST MEDICAL & SURGICAL HISTORY  As per history of present illness.     ALLERGIES  iodine containing compounds (Hives; Anaphylaxis)  clindamycin (Pruritus)  fish (Hives; Urticaria)  vancomycin (Anaphylaxis; Hives)  penicillin (Hives)  shellfish. (Hives; Anaphylaxis)  amoxicillin (Short breath; Rash)  Bactrim I.V. (Rash (Mod to Severe))    CURRENT MEDICATIONS  apixaban 5 milliGRAM(s) Oral every 12 hours  atorvastatin 80 milliGRAM(s) Oral at bedtime  chlorhexidine 2% Cloths 1 Application(s) Topical <User Schedule>  clopidogrel Tablet 75 milliGRAM(s) Oral daily  clotrimazole 1% Cream 1 Application(s) Topical two times a day  dextrose 5%. 1000 milliLiter(s) IV Continuous <Continuous>  dextrose 5%. 1000 milliLiter(s) IV Continuous <Continuous>  dextrose 50% Injectable 25 Gram(s) IV Push once  dextrose 50% Injectable 12.5 Gram(s) IV Push once  dextrose 50% Injectable 25 Gram(s) IV Push once  dextrose Oral Gel 15 Gram(s) Oral once PRN  glucagon  Injectable 1 milliGRAM(s) IntraMuscular once  insulin glargine Injectable (LANTUS) 50 Unit(s) SubCutaneous every morning  insulin lispro (ADMELOG) corrective regimen sliding scale   SubCutaneous three times a day before meals  insulin lispro Injectable (ADMELOG) 20 Unit(s) SubCutaneous three times a day before meals  insulin regular Infusion 1 Unit(s)/Hr IV Continuous <Continuous>  metoprolol succinate ER 50 milliGRAM(s) Oral daily  oxyCODONE    IR 10 milliGRAM(s) Oral every 4 hours PRN  pantoprazole    Tablet 40 milliGRAM(s) Oral every 24 hours  sodium chloride 0.9% 1000 milliLiter(s) IV Continuous <Continuous>    REVIEW OF SYSTEMS  Constitutional:  Negative fever, chills or loss of appetite.  Cardiovascular:  Negative for chest pain or palpitations.  Respiratory:  Negative for cough, wheezing, or shortness of breath.   Gastrointestinal:  Negative for nausea, vomiting, diarrhea, constipation, or abdominal pain.  Genitourinary:  (+) erythema, burning and pruritus of vulva. Negative frequency, urgency or dysuria.  Neurologic:  No headache, confusion, dizziness, lightheadedness.    PHYSICAL EXAM  Vital Signs Last 24 Hrs  T(C): 35.4 (01 Jan 2024 09:44), Max: 36.8 (31 Dec 2023 22:34)  T(F): 95.8 (01 Jan 2024 09:44), Max: 98.3 (31 Dec 2023 22:34)  HR: 99 (01 Jan 2024 14:00) (67 - 106)  BP: 126/66 (01 Jan 2024 14:00) (119/71 - 193/95)  BP(mean): 90 (01 Jan 2024 14:00) (89 - 136)  RR: 26 (01 Jan 2024 14:00) (18 - 39)  SpO2: 99% (01 Jan 2024 14:00) (95% - 117%)    Parameters below as of 01 Jan 2024 14:00  Patient On (Oxygen Delivery Method): room air    Constitutional: Awake, alert, obese female, in no acute distress.   HEENT: Normocephalic, atraumatic, NAOMI.  Respiratory: Lungs clear to ausculation bilaterally.   Cardiovascular: regular rhythm, normal S1 and S2, no audible murmurs.   GI: soft, non-tender, non-distended, bowel sounds present.  Extremities: No lower extremity edema.  Psychiatric: AAO x 3.     LABS  CBC - WBC/HGB/HTC/PLT: 12.87/12.8/41.7/318 (01-01-24)  BMP: Na/K/Cl/Bicarb/BUN/Cr/Gluc: 130/4.2/96/16/10/0.61/420 (01-01-24)  Anion Gap: 18 (01-01-24)  eGFR: 114 (01-01-24)  Calcium: 8.3 (01-01-24)  Phosphorus: 1.3 (01-01-24)  Magnesium: 2.3 (01-01-24)  LFT - Alb/Tprot/Tbili/Dbili/AlkPhos/ALT/AST: 3.5/--/0.3/0.2/187/15/18 (12-31-23)  PT/aPTT/INR: 11.5/28.8/1.01 (12-31-23)  Thyroid Stimulating Hormone, Serum: 0.797 (12-31-23)    ASSESSMENT / RECOMMENDATIONS  Ms. Katz is a 43-year-old female with a past medical history of uncontrolled type 2 diabetes mellitus, hypertension, hyperlipidemia, CVA (11/2021 with left sided residual weakness), pulmonary embolism (4/2023, on eliquis), coronary artery disease (PCI s/p NAT 7/2027 and 8/2027), history of abdominal necrotizing fasciitis (ostomy placement in 11/2021), multiple abdominal hernia repairs, recurrent UTI and previous admission for left labial abscess (8/2023) who presented to the emergency department complaining of left groin swelling that progressively increased over the previous 3 weeks. She was admitted to the MICU for further management of hyperglycemia, and was placed on an insulin infusion overnight with improvement of glucose levels. She patient was transitioned to a basal-bolus regimen with Lantus 50 units and Admelog 20 units three times daily before meals this morning. However, her glucose has continued to increase back to the 300s mg/dL despite above regimen. Endocrinology was consulted for recommendations regarding diabetes management.     A1C: 16.4 %  BUN: 10  Creatinine: 0.61  GFR: 114  Weight: 99.8    # Type 2 diabetes mellitus with hyperglycemia  - Please CHANGE lantus to NPH insulin 65 units every 8 hours (8AM, 4PM, 12AM).  - INCREASE lispro to 35 units three times daily before meals.   -Please write a customized order for Lispro insulin correctional scale as follows (FSG = Fingerstick glucose) to be given before meals:       > -200, give 3 units      > -250, give 6 units      > -300, give 9 units      > -350, give 12 units      > -400, give 15 units      > -450, give 18 units      >  and above, give 21 units   - Patient's fingerstick glucose goal is 100-180 mg/dL.    - Discharge recommendations to be discussed.   - Patient can follow up at discharge with Dr. Joseph at [facility] by calling [phone] to make an appointment.      Case discussed with Dr. Coyle. Primary team updated.       Gerald Qiu    Endocrinology Fellow    Service Pager: [phone]  HISTORY OF PRESENT ILLNESS  Rachel Katz is a 43-year-old female with a past medical history of uncontrolled type 2 diabetes mellitus, hypertension, hyperlipidemia, CVA (11/2021 with left sided residual weakness), pulmonary embolism (4/2023, on eliquis), coronary artery disease (PCI s/p NAT 7/2027 and 8/2027), history of abdominal necrotizing fasciitis (ostomy placement in 11/2021), multiple abdominal hernia repairs, recurrent UTI and previous admission for left labial abscess (8/2023) who presented to the emergency department complaining of left groin swelling that progressively increased over the previous 3 weeks.     In the emergency department, her labs were remarkable for elevated ESR (42 mm/hr), CRP (14.8 mg/L), hyperglycemia (654 mg/dL) and hypokalemia (2.8 mmol/L). Her anion gap was elevated to 20, but likely due to underlying lactic acidosis (lactic acid 6.9 mmol/L) as beta-hydroxybutyrate was within normal limits (0.4 mmol/L). Urinalysis showed WBC 24, with moderate bacteria, small leukocyte esterase and positive yeast-like cells. CT abdomen and pelvis was remarkable for a 1.2 cm left adrenal nodule (unchanged from previous imaging) without abscess of labia (s/p I&D of left labia abscess during last admission on 8/2023). She received 1 gram of ertapenem for possible cellulitis along with 125 mg of solumedrol.     The patient was admitted to the MICU for further management of hyperglycemia and cellulitis and was placed on an insulin infusion overnight with improvement of glucose levels. This morning, she was transitioned to a basal-bolus regimen with Lantus 50 units and Admelog 20 units three times daily before meals this morning. However, her glucose has continued to increase back to the 300s mg/dL despite above regimen. Endocrinology was consulted for recommendations regarding diabetes management.     CAPILLARY BLOOD GLUCOSE & INSULIN RECEIVED  >600 mg/dL (01-01 @ 01:35) -> Insulin infusion at 4 units/hr.  558 mg/dL (01-01 @ 02:28) -> Insulin infusion at 8 units/hr.  453 mg/dL (01-01 @ 03:35) -> Insulin infusion at 12 units/hr.  404 mg/dL (01-01 @ 04:36) -> Insulin infusion at 16 units/hr.  268 mg/dL (01-01 @ 05:32) -> Insulin infusion at 20 units/hr.  213 mg/dL (01-01 @ 06:18) -> Insulin infusion at 16 units/hr.  138 mg/dL (01-01 @ 07:26) -> Insulin infusion at 12 units/hr.  142 mg/dL (01-01 @ 08:30) -> ?  160 mg/dL (01-01 @ 09:28) -> ?  179 mg/dL (01-01 @ 10:31) -> 50 units of lantus + 20 units of lispro + 2 units of lispro sliding scale.  239 mg/dL (01-01 @ 11:35) -> Ø  310 mg/dL (01-01 @ 12:47) -> Ø  363 mg/dL (01-01 @ 13:36) -> Ø  414 mg/dL (01-01 @ 14:32) -> 12 units of lispro sliding scale.     DIABETES HISTORY  The patient is known to the endocrine service for multiple previous admissions, last seen on 8/2023. She was diagnosed with type 2 diabetes mellitus in 2005. She was started on metformin 1,000 mg twice daily on 2007. In 2009, she was pregnant and was started on glyburide (did not require insulin for pregnancy). On October 2020, she began seeing Dr. Joseph and was eventually transitioned to insulin followed by Humulin U500. During her last admission she was managed with Lantus 50 twice daily and lispro 25 three times daily before meals with labile glucose levels ranging from  mg/dL. She was discharged back on home Humalin U500 60 units three times daily and admelog 26 units three times daily with meals. Since then, she has self increased her doses to humulin U500 80 units three times daily and Admelog 36 units three times daily before meals; however, she sometimes increases the doses of admelog if her glucose is too high. Denied having hypoglycemic episodes. She reports that her fasting glucose is usually in the 200-300 mg/dL range and that it continues to increase throught the day up to 600s mg/dL. She says that her weight has been going up and down. Her last eye exam was >2 years ago, and says that she was told she had diabetic retinopathy. She also has neuropathy symptoms (i.e., numbness and tingling in her feet). She reports that she has been experiencing nausea and vomiting over the past month and that she has been mostly drinking liquids. She has been skipping most of the meals and says that she keeps drinking water or juice (e.g., tamarind or parcha). She is following with Dr. Joseph (Endocrinologist) outpatient; however, the last visit with her was document in February 2023.     PAST MEDICAL & SURGICAL HISTORY  As per history of present illness.     ALLERGIES  iodine containing compounds (Hives; Anaphylaxis)  clindamycin (Pruritus)  fish (Hives; Urticaria)  vancomycin (Anaphylaxis; Hives)  penicillin (Hives)  shellfish. (Hives; Anaphylaxis)  amoxicillin (Short breath; Rash)  Bactrim I.V. (Rash (Mod to Severe))    CURRENT MEDICATIONS  apixaban 5 milliGRAM(s) Oral every 12 hours  atorvastatin 80 milliGRAM(s) Oral at bedtime  chlorhexidine 2% Cloths 1 Application(s) Topical <User Schedule>  clopidogrel Tablet 75 milliGRAM(s) Oral daily  clotrimazole 1% Cream 1 Application(s) Topical two times a day  dextrose 5%. 1000 milliLiter(s) IV Continuous <Continuous>  dextrose 5%. 1000 milliLiter(s) IV Continuous <Continuous>  dextrose 50% Injectable 25 Gram(s) IV Push once  dextrose 50% Injectable 12.5 Gram(s) IV Push once  dextrose 50% Injectable 25 Gram(s) IV Push once  dextrose Oral Gel 15 Gram(s) Oral once PRN  glucagon  Injectable 1 milliGRAM(s) IntraMuscular once  insulin glargine Injectable (LANTUS) 50 Unit(s) SubCutaneous every morning  insulin lispro (ADMELOG) corrective regimen sliding scale   SubCutaneous three times a day before meals  insulin lispro Injectable (ADMELOG) 20 Unit(s) SubCutaneous three times a day before meals  insulin regular Infusion 1 Unit(s)/Hr IV Continuous <Continuous>  metoprolol succinate ER 50 milliGRAM(s) Oral daily  oxyCODONE    IR 10 milliGRAM(s) Oral every 4 hours PRN  pantoprazole    Tablet 40 milliGRAM(s) Oral every 24 hours  sodium chloride 0.9% 1000 milliLiter(s) IV Continuous <Continuous>    REVIEW OF SYSTEMS  Constitutional:  Negative fever, chills or loss of appetite.  Cardiovascular:  Negative for chest pain or palpitations.  Respiratory:  Negative for cough, wheezing, or shortness of breath.   Gastrointestinal:  Negative for nausea, vomiting, diarrhea, constipation, or abdominal pain.  Genitourinary:  (+) erythema, burning and pruritus of vulva. Negative frequency, urgency or dysuria.  Neurologic:  No headache, confusion, dizziness, lightheadedness.    PHYSICAL EXAM  Vital Signs Last 24 Hrs  T(C): 35.4 (01 Jan 2024 09:44), Max: 36.8 (31 Dec 2023 22:34)  T(F): 95.8 (01 Jan 2024 09:44), Max: 98.3 (31 Dec 2023 22:34)  HR: 99 (01 Jan 2024 14:00) (67 - 106)  BP: 126/66 (01 Jan 2024 14:00) (119/71 - 193/95)  BP(mean): 90 (01 Jan 2024 14:00) (89 - 136)  RR: 26 (01 Jan 2024 14:00) (18 - 39)  SpO2: 99% (01 Jan 2024 14:00) (95% - 117%)    Parameters below as of 01 Jan 2024 14:00  Patient On (Oxygen Delivery Method): room air    Constitutional: Awake, alert, obese female, in no acute distress.   HEENT: Normocephalic, atraumatic, NAOMI.  Respiratory: Lungs clear to ausculation bilaterally.   Cardiovascular: regular rhythm, normal S1 and S2, no audible murmurs.   GI: soft, non-tender, non-distended, bowel sounds present.  Extremities: No lower extremity edema.  Psychiatric: AAO x 3.     LABS  CBC - WBC/HGB/HTC/PLT: 12.87/12.8/41.7/318 (01-01-24)  BMP: Na/K/Cl/Bicarb/BUN/Cr/Gluc: 130/4.2/96/16/10/0.61/420 (01-01-24)  Anion Gap: 18 (01-01-24)  eGFR: 114 (01-01-24)  Calcium: 8.3 (01-01-24)  Phosphorus: 1.3 (01-01-24)  Magnesium: 2.3 (01-01-24)  LFT - Alb/Tprot/Tbili/Dbili/AlkPhos/ALT/AST: 3.5/--/0.3/0.2/187/15/18 (12-31-23)  PT/aPTT/INR: 11.5/28.8/1.01 (12-31-23)  Thyroid Stimulating Hormone, Serum: 0.797 (12-31-23)    ASSESSMENT / RECOMMENDATIONS  Ms. Katz is a 43-year-old female with a past medical history of uncontrolled type 2 diabetes mellitus, hypertension, hyperlipidemia, CVA (11/2021 with left sided residual weakness), pulmonary embolism (4/2023, on eliquis), coronary artery disease (PCI s/p NAT 7/2027 and 8/2027), history of abdominal necrotizing fasciitis (ostomy placement in 11/2021), multiple abdominal hernia repairs, recurrent UTI and previous admission for left labial abscess (8/2023) who presented to the emergency department complaining of left groin swelling that progressively increased over the previous 3 weeks. She was admitted to the MICU for further management of hyperglycemia, and was placed on an insulin infusion overnight with improvement of glucose levels. She patient was transitioned to a basal-bolus regimen with Lantus 50 units and Admelog 20 units three times daily before meals this morning. However, her glucose has continued to increase back to the 300s mg/dL despite above regimen. Endocrinology was consulted for recommendations regarding diabetes management.     A1C: 16.4 %  BUN: 10  Creatinine: 0.61  GFR: 114  Weight: 99.8    # Type 2 diabetes mellitus with hyperglycemia  - Please CHANGE lantus to NPH insulin 65 units every 8 hours (8AM, 4PM, 12AM).  - INCREASE lispro to 35 units three times daily before meals.   -Please write a customized order for Lispro insulin correctional scale as follows (FSG = Fingerstick glucose) to be given before meals:       > -200, give 3 units      > -250, give 6 units      > -300, give 9 units      > -350, give 12 units      > -400, give 15 units      > -450, give 18 units      >  and above, give 21 units   - Patient's fingerstick glucose goal is 100-180 mg/dL.    - Discharge recommendations to be discussed.   - Patient can follow up at discharge with Dr. Joseph at [facility] by calling [phone] to make an appointment.      Case discussed with Dr. Coyle. Primary team updated.       Gerald Qiu    Endocrinology Fellow    Service Pager: [phone]  HISTORY OF PRESENT ILLNESS  Rachel Katz is a 43-year-old female with a past medical history of uncontrolled type 2 diabetes mellitus, hypertension, hyperlipidemia, CVA (11/2021 with left sided residual weakness), pulmonary embolism (4/2023, on eliquis), coronary artery disease (PCI s/p NAT 7/2027 and 8/2027), history of abdominal necrotizing fasciitis (ostomy placement in 11/2021), multiple abdominal hernia repairs, recurrent UTI and previous admission for left labial abscess (8/2023) who presented to the emergency department complaining of left groin swelling that progressively increased over the previous 3 weeks.     In the emergency department, her labs were remarkable for elevated ESR (42 mm/hr), CRP (14.8 mg/L), hyperglycemia (654 mg/dL) and hypokalemia (2.8 mmol/L). Her anion gap was elevated to 20, but likely due to underlying lactic acidosis (lactic acid 6.9 mmol/L) as beta-hydroxybutyrate was within normal limits (0.4 mmol/L). Urinalysis showed WBC 24, with moderate bacteria, small leukocyte esterase and positive yeast-like cells. CT abdomen and pelvis was remarkable for a 1.2 cm left adrenal nodule (unchanged from previous imaging) without abscess of labia (s/p I&D of left labia abscess during last admission on 8/2023). She received 1 gram of ertapenem for possible cellulitis along with 125 mg of solumedrol.     The patient was admitted to the MICU for further management of hyperglycemia and cellulitis and was placed on an insulin infusion overnight with improvement of glucose levels. This morning, she was transitioned to a basal-bolus regimen with Lantus 50 units and Admelog 20 units three times daily before meals this morning. However, her glucose has continued to increase back to the 300s mg/dL despite above regimen. Endocrinology was consulted for recommendations regarding diabetes management.     CAPILLARY BLOOD GLUCOSE & INSULIN RECEIVED  >600 mg/dL (01-01 @ 01:35) -> Insulin infusion at 4 units/hr.  558 mg/dL (01-01 @ 02:28) -> Insulin infusion at 8 units/hr.  453 mg/dL (01-01 @ 03:35) -> Insulin infusion at 12 units/hr.  404 mg/dL (01-01 @ 04:36) -> Insulin infusion at 16 units/hr.  268 mg/dL (01-01 @ 05:32) -> Insulin infusion at 20 units/hr.  213 mg/dL (01-01 @ 06:18) -> Insulin infusion at 16 units/hr.  138 mg/dL (01-01 @ 07:26) -> Insulin infusion at 12 units/hr.  142 mg/dL (01-01 @ 08:30) -> ?  160 mg/dL (01-01 @ 09:28) -> ?  179 mg/dL (01-01 @ 10:31) -> 50 units of lantus + 20 units of lispro + 2 units of lispro sliding scale.  239 mg/dL (01-01 @ 11:35) -> Ø  310 mg/dL (01-01 @ 12:47) -> Ø  363 mg/dL (01-01 @ 13:36) -> Ø  414 mg/dL (01-01 @ 14:32) -> 12 units of lispro sliding scale.     DIABETES HISTORY  The patient is known to the endocrine service for multiple previous admissions, last seen on 8/2023. She was diagnosed with type 2 diabetes mellitus in 2005. She was started on metformin 1,000 mg twice daily on 2007. In 2009, she was pregnant and was started on glyburide (did not require insulin for pregnancy). On October 2020, she began seeing Dr. Joseph and was eventually transitioned to insulin followed by Humulin U500. During her last admission she was managed with Lantus 50 twice daily and lispro 25 three times daily before meals with labile glucose levels ranging from  mg/dL. She was discharged back on home Humalin U500 60 units three times daily and admelog 26 units three times daily with meals. Since then, she has self increased her doses to humulin U500 80 units three times daily and Admelog 36 units three times daily before meals; however, she sometimes increases the doses of admelog if her glucose is too high. Denied having hypoglycemic episodes. She reports that her fasting glucose is usually in the 200-300 mg/dL range and that it continues to increase throught the day up to 600s mg/dL. She says that her weight has been going up and down. Her last eye exam was >2 years ago, and says that she was told she had diabetic retinopathy. She also has neuropathy symptoms (i.e., numbness and tingling in her feet). She reports that she has been experiencing nausea and vomiting over the past month and that she has been mostly drinking liquids. She has been skipping most of the meals and says that she keeps drinking water or juice (e.g., tamarind or parcha). She is following with Dr. Joseph (Endocrinologist) outpatient; however, the last visit with her was document in February 2023.     PAST MEDICAL & SURGICAL HISTORY  As per history of present illness.     ALLERGIES  iodine containing compounds (Hives; Anaphylaxis)  clindamycin (Pruritus)  fish (Hives; Urticaria)  vancomycin (Anaphylaxis; Hives)  penicillin (Hives)  shellfish. (Hives; Anaphylaxis)  amoxicillin (Short breath; Rash)  Bactrim I.V. (Rash (Mod to Severe))    CURRENT MEDICATIONS  apixaban 5 milliGRAM(s) Oral every 12 hours  atorvastatin 80 milliGRAM(s) Oral at bedtime  chlorhexidine 2% Cloths 1 Application(s) Topical <User Schedule>  clopidogrel Tablet 75 milliGRAM(s) Oral daily  clotrimazole 1% Cream 1 Application(s) Topical two times a day  dextrose 5%. 1000 milliLiter(s) IV Continuous <Continuous>  dextrose 5%. 1000 milliLiter(s) IV Continuous <Continuous>  dextrose 50% Injectable 25 Gram(s) IV Push once  dextrose 50% Injectable 12.5 Gram(s) IV Push once  dextrose 50% Injectable 25 Gram(s) IV Push once  dextrose Oral Gel 15 Gram(s) Oral once PRN  glucagon  Injectable 1 milliGRAM(s) IntraMuscular once  insulin glargine Injectable (LANTUS) 50 Unit(s) SubCutaneous every morning  insulin lispro (ADMELOG) corrective regimen sliding scale   SubCutaneous three times a day before meals  insulin lispro Injectable (ADMELOG) 20 Unit(s) SubCutaneous three times a day before meals  insulin regular Infusion 1 Unit(s)/Hr IV Continuous <Continuous>  metoprolol succinate ER 50 milliGRAM(s) Oral daily  oxyCODONE    IR 10 milliGRAM(s) Oral every 4 hours PRN  pantoprazole    Tablet 40 milliGRAM(s) Oral every 24 hours  sodium chloride 0.9% 1000 milliLiter(s) IV Continuous <Continuous>    REVIEW OF SYSTEMS  Constitutional:  Negative fever, chills or loss of appetite.  Cardiovascular:  Negative for chest pain or palpitations.  Respiratory:  Negative for cough, wheezing, or shortness of breath.   Gastrointestinal:  Negative for nausea, vomiting, diarrhea, constipation, or abdominal pain.  Genitourinary:  (+) erythema, burning and pruritus of vulva. Negative frequency, urgency or dysuria.  Neurologic:  No headache, confusion, dizziness, lightheadedness.    PHYSICAL EXAM  Vital Signs Last 24 Hrs  T(C): 35.4 (01 Jan 2024 09:44), Max: 36.8 (31 Dec 2023 22:34)  T(F): 95.8 (01 Jan 2024 09:44), Max: 98.3 (31 Dec 2023 22:34)  HR: 99 (01 Jan 2024 14:00) (67 - 106)  BP: 126/66 (01 Jan 2024 14:00) (119/71 - 193/95)  BP(mean): 90 (01 Jan 2024 14:00) (89 - 136)  RR: 26 (01 Jan 2024 14:00) (18 - 39)  SpO2: 99% (01 Jan 2024 14:00) (95% - 117%)    Parameters below as of 01 Jan 2024 14:00  Patient On (Oxygen Delivery Method): room air    Constitutional: Awake, alert, obese female, in no acute distress.   HEENT: Normocephalic, atraumatic, NAOMI.  Respiratory: Lungs clear to ausculation bilaterally.   Cardiovascular: regular rhythm, normal S1 and S2, no audible murmurs.   GI: soft, non-tender, non-distended, bowel sounds present.  Extremities: No lower extremity edema.  Psychiatric: AAO x 3.     LABS  CBC - WBC/HGB/HTC/PLT: 12.87/12.8/41.7/318 (01-01-24)  BMP: Na/K/Cl/Bicarb/BUN/Cr/Gluc: 130/4.2/96/16/10/0.61/420 (01-01-24)  Anion Gap: 18 (01-01-24)  eGFR: 114 (01-01-24)  Calcium: 8.3 (01-01-24)  Phosphorus: 1.3 (01-01-24)  Magnesium: 2.3 (01-01-24)  LFT - Alb/Tprot/Tbili/Dbili/AlkPhos/ALT/AST: 3.5/--/0.3/0.2/187/15/18 (12-31-23)  PT/aPTT/INR: 11.5/28.8/1.01 (12-31-23)  Thyroid Stimulating Hormone, Serum: 0.797 (12-31-23)    ASSESSMENT / RECOMMENDATIONS  Ms. Katz is a 43-year-old female with a past medical history of uncontrolled type 2 diabetes mellitus, hypertension, hyperlipidemia, CVA (11/2021 with left sided residual weakness), pulmonary embolism (4/2023, on eliquis), coronary artery disease (PCI s/p NAT 7/2027 and 8/2027), history of abdominal necrotizing fasciitis (ostomy placement in 11/2021), multiple abdominal hernia repairs, recurrent UTI and previous admission for left labial abscess (8/2023) who presented to the emergency department complaining of left groin swelling that progressively increased over the previous 3 weeks. She was admitted to the MICU for further management of hyperglycemia, and was placed on an insulin infusion overnight with improvement of glucose levels. She patient was transitioned to a basal-bolus regimen with Lantus 50 units and Admelog 20 units three times daily before meals this morning. However, her glucose has continued to increase back to the 300s mg/dL despite above regimen. Endocrinology was consulted for recommendations regarding diabetes management.     A1C: 16.4 %  BUN: 10  Creatinine: 0.61  GFR: 114  Weight: 99.8    # Type 2 diabetes mellitus with hyperglycemia / Steroid-induced hyperglycemia  - Please CHANGE lantus to NPH insulin 65 units every 8 hours (8AM, 4PM, 12AM).  - INCREASE lispro to 35 units three times daily before meals.   -Please write a customized order for Lispro insulin correctional scale as follows (FSG = Fingerstick glucose) to be given before meals:       > -200, give 3 units      > -250, give 6 units      > -300, give 9 units      > -350, give 12 units      > -400, give 15 units      > -450, give 18 units      >  and above, give 21 units   - Patient's fingerstick glucose goal is 100-180 mg/dL.    - Discharge recommendations to be discussed.   - Patient can follow up at discharge with Dr. Joseph at [facility] by calling [phone] to make an appointment.      Case discussed with Dr. Coyle. Primary team updated.       Gerald Qiu    Endocrinology Fellow    Service Pager: [phone]  HISTORY OF PRESENT ILLNESS  Rachel Katz is a 43-year-old female with a past medical history of uncontrolled type 2 diabetes mellitus, hypertension, hyperlipidemia, CVA (11/2021 with left sided residual weakness), pulmonary embolism (4/2023, on eliquis), coronary artery disease (PCI s/p NAT 7/2027 and 8/2027), history of abdominal necrotizing fasciitis (ostomy placement in 11/2021), multiple abdominal hernia repairs, recurrent UTI and previous admission for left labial abscess (8/2023) who presented to the emergency department complaining of left groin swelling that progressively increased over the previous 3 weeks.     In the emergency department, her labs were remarkable for elevated ESR (42 mm/hr), CRP (14.8 mg/L), hyperglycemia (654 mg/dL) and hypokalemia (2.8 mmol/L). Her anion gap was elevated to 20, but likely due to underlying lactic acidosis (lactic acid 6.9 mmol/L) as beta-hydroxybutyrate was within normal limits (0.4 mmol/L). Urinalysis showed WBC 24, with moderate bacteria, small leukocyte esterase and positive yeast-like cells. CT abdomen and pelvis was remarkable for a 1.2 cm left adrenal nodule (unchanged from previous imaging) without abscess of labia (s/p I&D of left labia abscess during last admission on 8/2023). She received 1 gram of ertapenem for possible cellulitis along with 125 mg of solumedrol.     The patient was admitted to the MICU for further management of hyperglycemia and cellulitis and was placed on an insulin infusion overnight with improvement of glucose levels. This morning, she was transitioned to a basal-bolus regimen with Lantus 50 units and Admelog 20 units three times daily before meals this morning. However, her glucose has continued to increase back to the 300s mg/dL despite above regimen. Endocrinology was consulted for recommendations regarding diabetes management.     CAPILLARY BLOOD GLUCOSE & INSULIN RECEIVED  >600 mg/dL (01-01 @ 01:35) -> Insulin infusion at 4 units/hr.  558 mg/dL (01-01 @ 02:28) -> Insulin infusion at 8 units/hr.  453 mg/dL (01-01 @ 03:35) -> Insulin infusion at 12 units/hr.  404 mg/dL (01-01 @ 04:36) -> Insulin infusion at 16 units/hr.  268 mg/dL (01-01 @ 05:32) -> Insulin infusion at 20 units/hr.  213 mg/dL (01-01 @ 06:18) -> Insulin infusion at 16 units/hr.  138 mg/dL (01-01 @ 07:26) -> Insulin infusion at 12 units/hr.  142 mg/dL (01-01 @ 08:30) -> ?  160 mg/dL (01-01 @ 09:28) -> ?  179 mg/dL (01-01 @ 10:31) -> 50 units of lantus + 20 units of lispro + 2 units of lispro sliding scale.  239 mg/dL (01-01 @ 11:35) -> Ø  310 mg/dL (01-01 @ 12:47) -> Ø  363 mg/dL (01-01 @ 13:36) -> Ø  414 mg/dL (01-01 @ 14:32) -> 12 units of lispro sliding scale.     DIABETES HISTORY  The patient is known to the endocrine service for multiple previous admissions, last seen on 8/2023. She was diagnosed with type 2 diabetes mellitus in 2005. She was started on metformin 1,000 mg twice daily on 2007. In 2009, she was pregnant and was started on glyburide (did not require insulin for pregnancy). On October 2020, she began seeing Dr. Joseph and was eventually transitioned to insulin followed by Humulin U500. During her last admission she was managed with Lantus 50 twice daily and lispro 25 three times daily before meals with labile glucose levels ranging from  mg/dL. She was discharged back on home Humalin U500 60 units three times daily and admelog 26 units three times daily with meals. Since then, she has self increased her doses to humulin U500 80 units three times daily and Admelog 36 units three times daily before meals; however, she sometimes increases the doses of admelog if her glucose is too high. Denied having hypoglycemic episodes. She reports that her fasting glucose is usually in the 200-300 mg/dL range and that it continues to increase throught the day up to 600s mg/dL. She says that her weight has been going up and down. Her last eye exam was >2 years ago, and says that she was told she had diabetic retinopathy. She also has neuropathy symptoms (i.e., numbness and tingling in her feet). She reports that she has been experiencing nausea and vomiting over the past month and that she has been mostly drinking liquids. She has been skipping most of the meals and says that she keeps drinking water or juice (e.g., tamarind or parcha). She is following with Dr. Joseph (Endocrinologist) outpatient; however, the last visit with her was document in February 2023.     PAST MEDICAL & SURGICAL HISTORY  As per history of present illness.     ALLERGIES  iodine containing compounds (Hives; Anaphylaxis)  clindamycin (Pruritus)  fish (Hives; Urticaria)  vancomycin (Anaphylaxis; Hives)  penicillin (Hives)  shellfish. (Hives; Anaphylaxis)  amoxicillin (Short breath; Rash)  Bactrim I.V. (Rash (Mod to Severe))    CURRENT MEDICATIONS  apixaban 5 milliGRAM(s) Oral every 12 hours  atorvastatin 80 milliGRAM(s) Oral at bedtime  chlorhexidine 2% Cloths 1 Application(s) Topical <User Schedule>  clopidogrel Tablet 75 milliGRAM(s) Oral daily  clotrimazole 1% Cream 1 Application(s) Topical two times a day  dextrose 5%. 1000 milliLiter(s) IV Continuous <Continuous>  dextrose 5%. 1000 milliLiter(s) IV Continuous <Continuous>  dextrose 50% Injectable 25 Gram(s) IV Push once  dextrose 50% Injectable 12.5 Gram(s) IV Push once  dextrose 50% Injectable 25 Gram(s) IV Push once  dextrose Oral Gel 15 Gram(s) Oral once PRN  glucagon  Injectable 1 milliGRAM(s) IntraMuscular once  insulin glargine Injectable (LANTUS) 50 Unit(s) SubCutaneous every morning  insulin lispro (ADMELOG) corrective regimen sliding scale   SubCutaneous three times a day before meals  insulin lispro Injectable (ADMELOG) 20 Unit(s) SubCutaneous three times a day before meals  insulin regular Infusion 1 Unit(s)/Hr IV Continuous <Continuous>  metoprolol succinate ER 50 milliGRAM(s) Oral daily  oxyCODONE    IR 10 milliGRAM(s) Oral every 4 hours PRN  pantoprazole    Tablet 40 milliGRAM(s) Oral every 24 hours  sodium chloride 0.9% 1000 milliLiter(s) IV Continuous <Continuous>    REVIEW OF SYSTEMS  Constitutional:  Negative fever, chills or loss of appetite.  Cardiovascular:  Negative for chest pain or palpitations.  Respiratory:  Negative for cough, wheezing, or shortness of breath.   Gastrointestinal:  Negative for nausea, vomiting, diarrhea, constipation, or abdominal pain.  Genitourinary:  (+) erythema, burning and pruritus of vulva. Negative frequency, urgency or dysuria.  Neurologic:  No headache, confusion, dizziness, lightheadedness.    PHYSICAL EXAM  Vital Signs Last 24 Hrs  T(C): 35.4 (01 Jan 2024 09:44), Max: 36.8 (31 Dec 2023 22:34)  T(F): 95.8 (01 Jan 2024 09:44), Max: 98.3 (31 Dec 2023 22:34)  HR: 99 (01 Jan 2024 14:00) (67 - 106)  BP: 126/66 (01 Jan 2024 14:00) (119/71 - 193/95)  BP(mean): 90 (01 Jan 2024 14:00) (89 - 136)  RR: 26 (01 Jan 2024 14:00) (18 - 39)  SpO2: 99% (01 Jan 2024 14:00) (95% - 117%)    Parameters below as of 01 Jan 2024 14:00  Patient On (Oxygen Delivery Method): room air    Constitutional: Awake, alert, obese female, in no acute distress.   HEENT: Normocephalic, atraumatic, NAOMI.  Respiratory: Lungs clear to ausculation bilaterally.   Cardiovascular: regular rhythm, normal S1 and S2, no audible murmurs.   GI: soft, non-tender, non-distended, bowel sounds present.  Extremities: No lower extremity edema.  Psychiatric: AAO x 3.     LABS  CBC - WBC/HGB/HTC/PLT: 12.87/12.8/41.7/318 (01-01-24)  BMP: Na/K/Cl/Bicarb/BUN/Cr/Gluc: 130/4.2/96/16/10/0.61/420 (01-01-24)  Anion Gap: 18 (01-01-24)  eGFR: 114 (01-01-24)  Calcium: 8.3 (01-01-24)  Phosphorus: 1.3 (01-01-24)  Magnesium: 2.3 (01-01-24)  LFT - Alb/Tprot/Tbili/Dbili/AlkPhos/ALT/AST: 3.5/--/0.3/0.2/187/15/18 (12-31-23)  PT/aPTT/INR: 11.5/28.8/1.01 (12-31-23)  Thyroid Stimulating Hormone, Serum: 0.797 (12-31-23)    ASSESSMENT / RECOMMENDATIONS  Ms. Katz is a 43-year-old female with a past medical history of uncontrolled type 2 diabetes mellitus, hypertension, hyperlipidemia, CVA (11/2021 with left sided residual weakness), pulmonary embolism (4/2023, on eliquis), coronary artery disease (PCI s/p NAT 7/2027 and 8/2027), history of abdominal necrotizing fasciitis (ostomy placement in 11/2021), multiple abdominal hernia repairs, recurrent UTI and previous admission for left labial abscess (8/2023) who presented to the emergency department complaining of left groin swelling that progressively increased over the previous 3 weeks. She was admitted to the MICU for further management of hyperglycemia, and was placed on an insulin infusion overnight with improvement of glucose levels. She patient was transitioned to a basal-bolus regimen with Lantus 50 units and Admelog 20 units three times daily before meals this morning. However, her glucose has continued to increase back to the 300s mg/dL despite above regimen. Endocrinology was consulted for recommendations regarding diabetes management.     A1C: 16.4 %  BUN: 10  Creatinine: 0.61  GFR: 114  Weight: 99.8    # Type 2 diabetes mellitus with hyperglycemia / Steroid-induced hyperglycemia  - Please CHANGE lantus to NPH insulin 65 units every 8 hours (8AM, 4PM, 12AM).  - INCREASE lispro to 35 units three times daily before meals.   -Please write a customized order for Lispro insulin correctional scale as follows (FSG = Fingerstick glucose) to be given before meals:       > -200, give 3 units      > -250, give 6 units      > -300, give 9 units      > -350, give 12 units      > -400, give 15 units      > -450, give 18 units      >  and above, give 21 units   - Patient's fingerstick glucose goal is 100-180 mg/dL.    - Discharge recommendations to be discussed.   - Patient can follow up at discharge with Dr. Joseph at [facility] by calling [phone] to make an appointment.      Case discussed with Dr. Coyle. Primary team updated.       Gerald Qiu    Endocrinology Fellow    Service Pager: [phone]

## 2024-01-01 NOTE — PROGRESS NOTE ADULT - ASSESSMENT
43F PMH Asthma, CVA (11/2021; residual L>R weakness), PE (4/2023 on Eliquis), uncontrolled DM-2 (on insulin), HTN, HLD, hx abdominal necrotizing fasciitis (ostomy placement in 11/2021 with intubation from 11-12/2021),multiple abd hernia repairs, frequent UTIs, CAD s/p PCI presenting with worsening groin pain and erythema, found to have glucose >600 and AG in ED (now closed). Admitted to MICU for hyperglycemia requiring insulin gtt. On 1/1, pt weaned off insulin gtt, pending endo recs. Pending ID recs.     NEURO  AAOx3  DEJON    CARDIOVASCULAR  #CAD  #Stable Angina  Know history of CAD with multiple prior stents. P/w CP intermittently at rest and with exertion, lightheadedness, and SOB. In 8/2023, pt admitted for tx of groin abscess however was transferred to cardiology for staged PCI. [facility] 06/26/23 w/ NAT x 1 pRCA (85%) and NAT x 1 RPLS (80%); residual dLCx/OM1 70%, mLAD 50%. staged PCI 08/01/2023 NAT x 1 mLCx/OM1 (70%), RCA: patent stents, LM: short segment, MLI, mLAD: 40%. Echo 06/23/23: Mild LVH. Hyperdynamic LVSF. Normal RV/RVSF. Admission EKG 12/31/23 stable, +sinus tachycardia.   - C/w home Plavix 75mg PO qd and Lipitor 80mg PO qd    #Essential HTN  Home med: metoprolol succinate ER 50mg PO qd  - C/w home med    PULMONARY  #History of PE  History of bilateral pulmonary emboli in segmental and subsegmental branches on the R and subsegmental branches on the L, diagnosed on CT chest during ED visit on 4/7/23. Home meds: Eliquis 5mg BID  - C/w Eliquis 5mg PO BID    GI  #h/o abdominal necrotizing fasciitis   #colostomy hernia  Pt presenting with groin pain and erythema, abd pain, n/v at home. Recently hospitalized at [facility] 8/2023 for L groin abscess and purulent cellulitis. H/o abdominal necrotizing fascitis s/p ostomy placement. Pt with pain around the ostomy, parastomal hernia palpated.   - Following surgery recs: no acute surgical intervention indicated at this time    RENAL  #HAGMA, improved.  On admission, found to have glucose >600, AG 20. Lactate 6.9, VBG pH 7.44. AG closed after receiving 3L NS in ED. Lactate downtrended to 1.8. BHB 0.4. Potassium 2.8. S/p aggressive K repletion, K of 4.2 on 1/1. S/p insulin gtt. HAGMA 2/2 lactic acidosis vs. elevated glucose.  - Continue to monitor    #Hypokalemia  K 2.8 on admission, slowly uptrending. Corrected Na ~138. S/p repletion of K. K of 4.2 on 1/1.   - Continue to monitor    #Hypophosphatemia.  Phos of 1.3 on 1/1.  - Repleted  - F/u AM phos level, replete as needed    ENDO  #Hyperglycemia  #Uncontrolled T2DM  Pt with h/o DM, endo consulted last admission, discharged w/ Humalog 60 units TID and Lispro 26 units TID. Pt states she takes Humalog 80U TID and Lispro 36U TID. Noted to be hyperglycemic throughout last hospitalization. Now p/w abd pain, n/v at home, found to have glc >600. Initially w/ HAGMA, however now closed x3. AG likely i/s/o lactic acidosis that resolved with fluid resuscitation. BHB 0.4. S/p 10U regular insulin in ED. Hb A1c 16.4. S/p fluids and insulin gtt.   - F/u endo recs  - C/w FSG check  - C/w NS at 200cc/hr  - Started Lantus 50U in AM  - Started Lispro 20U TID pre-meals     ID  #Cellulitis of groin.   #L labial abscess  Presenting with worsening groin and lower abd erythema, on exam noted to have red irritated skin w/ desquamation. CTAP w/o abscess or fluid collection. s/p I&D of L labial abscess last admission. pt has multiple abx allergies.   - F/u ID recs  - F/u Bcx (NGTD)  - C/w topical clotrimazole     #Frequent UTI  Pt w/ history of UTI 2/2 suprapubic cath now removed 6/2023, recent admission for klebsiella and E coli UTI.  Chronic dysuria. UCx 7/2023 growing MSSA and E. faecalis, completed linezolid course. S/p linezolid 600mg x1 and ertapenem 1000mg x1 in ED. UA this admission with WBC >24. Ucx with MSSA. WBC trended up to 12.87 on 1/1.   - F/u ID recs    PROPHYLAXIS  F: NS +40meq K @200cc/hr  E: K>4 Mg>2  N: Consistent carb diet  GI: Protonix 40mg PO qd  DVT: Eliquis 5mg PO BID  Dispo: MICU 43F PMH Asthma, CVA (11/2021; residual L>R weakness), PE (4/2023 on Eliquis), uncontrolled DM-2 (on insulin), HTN, HLD, hx abdominal necrotizing fasciitis (ostomy placement in 11/2021 with intubation from 11-12/2021),multiple abd hernia repairs, frequent UTIs, CAD s/p PCI presenting with worsening groin pain and erythema, found to have glucose >600 and AG in ED (now closed). Admitted to MICU for hyperglycemia requiring insulin gtt. On 1/1, pt weaned off insulin gtt, pending endo recs. Pending ID recs.     NEURO  AAOx3  DEJON    CARDIOVASCULAR  #CAD  #Stable Angina  Know history of CAD with multiple prior stents. P/w CP intermittently at rest and with exertion, lightheadedness, and SOB. In 8/2023, pt admitted for tx of groin abscess however was transferred to cardiology for staged PCI. [facility] 06/26/23 w/ NAT x 1 pRCA (85%) and NAT x 1 RPLS (80%); residual dLCx/OM1 70%, mLAD 50%. staged PCI 08/01/2023 NAT x 1 mLCx/OM1 (70%), RCA: patent stents, LM: short segment, MLI, mLAD: 40%. Echo 06/23/23: Mild LVH. Hyperdynamic LVSF. Normal RV/RVSF. Admission EKG 12/31/23 stable, +sinus tachycardia.   - C/w home Plavix 75mg PO qd and Lipitor 80mg PO qd    #Essential HTN  Home med: metoprolol succinate ER 50mg PO qd  - C/w home med    PULMONARY  #History of PE  History of bilateral pulmonary emboli in segmental and subsegmental branches on the R and subsegmental branches on the L, diagnosed on CT chest during ED visit on 4/7/23. Home meds: Eliquis 5mg BID  - C/w Eliquis 5mg PO BID    GI  #h/o abdominal necrotizing fasciitis   #colostomy hernia  Pt presenting with groin pain and erythema, abd pain, n/v at home. Recently hospitalized at [facility] 8/2023 for L groin abscess and purulent cellulitis. H/o abdominal necrotizing fascitis s/p ostomy placement. Pt with pain around the ostomy, parastomal hernia palpated.   - Following surgery recs: no acute surgical intervention indicated at this time    RENAL  #HAGMA, improved.  On admission, found to have glucose >600, AG 20. Lactate 6.9, VBG pH 7.44. AG closed after receiving 3L NS in ED. Lactate downtrended to 1.8. BHB 0.4. Potassium 2.8. S/p aggressive K repletion, K of 4.2 on 1/1. S/p insulin gtt. HAGMA 2/2 lactic acidosis vs. elevated glucose.  - Continue to monitor    #Hypokalemia  K 2.8 on admission, slowly uptrending. Corrected Na ~138. S/p repletion of K. K of 4.2 on 1/1.   - Continue to monitor    #Hypophosphatemia.  Phos of 1.3 on 1/1.  - Repleted  - F/u AM phos level, replete as needed    ENDO  #Hyperglycemia  #Uncontrolled T2DM  Pt with h/o DM, endo consulted last admission, discharged w/ Humalog 60 units TID and Lispro 26 units TID. Pt states she takes Humalog 80U TID and Lispro 36U TID. Noted to be hyperglycemic throughout last hospitalization. Now p/w abd pain, n/v at home, found to have glc >600. Initially w/ HAGMA, however now closed x3. AG likely i/s/o lactic acidosis that resolved with fluid resuscitation. BHB 0.4. S/p 10U regular insulin in ED. Hb A1c 16.4. S/p fluids and insulin gtt.   - F/u endo recs  - C/w FSG check  - C/w NS at 200cc/hr  - Started Lantus 50U in AM  - Started Lispro 20U TID pre-meals     ID  #Cellulitis of groin.   #L labial abscess  Presenting with worsening groin and lower abd erythema, on exam noted to have red irritated skin w/ desquamation. CTAP w/o abscess or fluid collection. s/p I&D of L labial abscess last admission. pt has multiple abx allergies.   - F/u ID recs  - F/u Bcx (NGTD)  - C/w topical clotrimazole     #Frequent UTI  Pt w/ history of UTI 2/2 suprapubic cath now removed 6/2023, recent admission for klebsiella and E coli UTI.  Chronic dysuria. UCx 7/2023 growing MSSA and E. faecalis, completed linezolid course. S/p linezolid 600mg x1 and ertapenem 1000mg x1 in ED. UA this admission with WBC >24. Ucx with MSSA. WBC trended up to 12.87 on 1/1.   - F/u ID recs    PROPHYLAXIS  F: NS +40meq K @200cc/hr  E: K>4 Mg>2  N: Consistent carb diet  GI: Protonix 40mg PO qd  DVT: Eliquis 5mg PO BID  Dispo: MICU

## 2024-01-01 NOTE — CONSULT NOTE ADULT - ASSESSMENT
Overall patient is not manifesting systemic signs of infection. No fever/leukocytosis or any systemic symptoms of infection. The redness affecting her groin has been present for >1 month, and is likely intertrigo rather than SSTI/NSTI. It has not spread to involve areas other than skin folds. Her other symptoms of dizziness/SOB/nausea/intolerance of PO are likely due to her uncontrolled diabetes. However, patient feels strongly that this groin rash is a bacterial infection and she is very worried that it is nec fasc again. There are no signs of this on physical exam by me or surgical team, or on CT. However, given patient's history of necrotizing fasciitis and risk factors for Solomon's given uncontrolled diabetes and glucosuria, I think it is reasonable to continue broad spectrum abx for now as below, in addition to treatment for intertrigo.    # Groin redness - likely intertrigo rather than SSTI  - Continue clotrimazole cream BID to affected areas  - For now, continue linezolid 600mg IV BID and ertapenem 1g IV q24h. Anticipate short course.  - Check HIV screen    # MSSA bacteriuria - likely colonization  - Patient has had chronic MSSA bacteriuria likely related to prior suprapubic cath (now removed). Never had bacteremia with this, and current BCx are no growth to date. Patient has no systemic signs of infection, and no evidence of bone/joint/heart metastatic MSSA infection on exam. She has no urinary symptoms, and CT without any urinary tract abnormalities. Antibiotics are as above for now, and will follow blood cultures.    ID Team 2 will follow

## 2024-01-01 NOTE — CONSULT NOTE ADULT - ATTENDING COMMENTS
43yoF h/o poorly controlled T2DM (severely insulin resistant), HTN, HLD, abdominal necrotizing fasciitis, CVA, PE, admitted for groin skin sloughing, nausea, chest pain. Patient says she’s had nausea and chest pain for a few months. She’s been drinking tamarind and passionfruit juice at home due to poor appetite. She came to the hospital because when she was taking a shower and using a wash cloth to clean the perineal area the skin sloughed from the groin. On admission, labs and clinical presentation were c/w HHS. Was treated with insulin gtt overnight and then transitioned to basal bolus insulin 1/1/24: Lantus 50 units and Admelog 20 units before meals. FSGs have been high afterwards. She has been eating. Of note this admission received 125mg solumedrol on 12/31/23.       T2DM was diagnosed in 2005. Follows with Dr. Nisha Joseph at [facility], last seen Feb 2023. At home was taking U500 80u q8h, Admelog 35u qAc. On the last admission she was given lantus 50u bid and Lispro 25u with meals.        Since she takes U500 q8h at home as her basal insulin we would recommend giving her NPH q8h here (pharmacokinetics of U500 is similar to NPH) so that we can assess her basal requirements and make a smooth transition when she is ready for discharge – can try NPH 65u q8h for now since she received a large dose of solumedrol yesterday. Give Lispro 35u qAc and use a modified correctional sliding scale per Dr. Jo Ann Qiu’s note.      Laverne Coyle MD  Endocrinology Attending 43yoF h/o poorly controlled T2DM (severely insulin resistant), HTN, HLD, abdominal necrotizing fasciitis, CVA, PE, admitted for groin skin sloughing, nausea, chest pain. Patient says she’s had nausea and chest pain for a few months. She’s been drinking tamarind and passionfruit juice at home due to poor appetite. She came to the hospital because when she was taking a shower and using a wash cloth to clean the perineal area the skin sloughed from the groin. On admission, labs and clinical presentation were c/w HHS. Was treated with insulin gtt overnight and then transitioned to basal bolus insulin 1/1/24: Lantus 50 units and Admelog 20 units before meals. FSGs have been high afterwards. She has been eating. Of note this admission received 125mg solumedrol on 12/31/23.       T2DM was diagnosed in 2005. Follows with Dr. Nisha Joseph at [facility], last seen Feb 2023. At home was taking U500 80u q8h, Admelog 35u qAc. On the last admission she was given lantus 50u bid and Lispro 25u with meals.        Since she takes U500 q8h at home as her basal insulin we would recommend giving her NPH q8h here (pharmacokinetics of U500 is similar to NPH) so that we can assess her basal requirements and make a smooth transition when she is ready for discharge – can try NPH 65u q8h for now since she received a large dose of solumedrol yesterday. Give Lispro 35u qAc and use a modified correctional sliding scale per Dr. Jo Ann Qiu’s note.      Laverne Coyle MD  Endocrinology Attending

## 2024-01-01 NOTE — H&P ADULT - ASSESSMENT
43F PMH Asthma, CVA (11/2021; residual L>R weakness), PE (4/2023 on Eliquis), uncontrolled DM-2 (on insulin), HTN, HLD, hx abdominal necrotizing fasciitis (ostomy placement in 11/2021 with intubation from 11-12/2021),multiple abd hernia repairs, frequent UTIs, CAD s/p PCI presenting with worsening groin pain and erythema, found to have glucose >600 and AG in ED (now closed). Admitted to MICU for hyperglycemia requiring insulin gtt.    NEURO  #Pain control  - oxycodone 10mg q4hr prn    CARDIOVASCULAR  #CAD  #Stable Angina  Know history of CAD with multiple prior stents. Now complaining of CP intermittently at rest and with exertion, lightheadedness, and SOB. In 8/2023, pt admitted for tx of groin abscess however was transferred to cardiology for staged PCI. [facility] 06/26/23 w/ NAT x 1 pRCA (85%) and NAT x 1 RPLS (80%); residual dLCx/OM1 70%, mLAD 50%. staged PCI 08/01/2023 NAT x 1 mLCx/OM1 (70%), RCA: patent stents, LM: short segment, MLI, mLAD: 40%. Echo 06/23/23: Mild LVH. Hyperdynamic LVSF. Normal RV/RVSF. Admission EKG 12/31/23 stable, +sinus tachycardia.  - c/w plavix 75mg qd, eliquis 5mg BID, lipitor 80mg qd    #Essential HTN  Home med: metoprolol succinate ER 50mg qd  - c/w home med    PULMONARY  #History of PE  History of bilateral pulmonary emboli in segmental and subsegmental branches on the R and subsegmental branches on the L, diagnosed on CT chest during ED visit on 4/7/23. Home meds: Eliquis 5mg BID  - c/w Eliquis 5mg BID    GI  #h/o abdominal necrotizing fasciitis   #colostomy hernia  Pt presenting with groin pain and erythema, abd pain, n/v at home. Recently hospitalized at [facility] 8/2023 for L groin abscess and purulent cellulitis. H/o abdominal necrotizing fascitis s/p ostomy placement. Pt with pain around the ostomy, parastomal hernia palpated, tender to touch. Surgery consulted in ED. No intervention at this time.    RENAL  #NAGMA  Pt initially presenting with anion gap metabolic acidosis, found to have glc >600, AG 20. Lactate 6.9, VBG pH 7.44. AG closed after fluids, received 3L NS in ED. Lactate downtrending. BHB 0.4. Potassium 2.8.  - c/w agressive electrolyte repletion  - c/w NS +40meq K @150cc/hr    #Electrolyte abnormalities  K 2.8 on admission, slowly uptrending. Corrected Na ~138.  - c/w NS +40meq K @150cc/hr  - goal K >5.3  - q4 BMP w/ aggressive repletion of lytes  - daily EKG  - monitor I/Os    ENDO  #Hyperosmolar Hyperglycemic State  #Uncontrolled T2DM  Pt with h/o DM, endo consulted last admission, discharged w/ Humalog U 500 60 units TID and admelog 26 units TID. Inpatient regimen: lantus 50u BID, lispro 25u TID,  Noted to be hyperglycemic throughout last hospitalization. Now p/w abd pain, n/v at home, found to have glc >600. Initially w/ AGMA, however now closed x3. AG likely i/s/o lactic acidosis that resolved with fluid resuscitation. BHB 0.4. s/p 10u regular insulin in ED.   - endo consult in AM  - c/w NS +40meq K @150cc/hr  - c/w insulin gtt, gradual reduction of glc per hyperglycemia protocol  - q1hr fsg  - electrolyte repletion  - frequent neuro checks  - management of infection as below  - strict I/Os  - restart home insulin regimen when appropriate    ID  #Cellulitis  Presenting with worsening groin and lower abd erythema, on exam noted to have red irritated skin w/ desquamation. CTAP w/o abscess or fluid collection. s/p I&D of L labial abscess last admission. pt has multiple abx allergies.  - ID consult in AM  - f/u BCx  - f/u UCx  - c/w topical clotrimazole     #Frequent UTI  #L labial abscess  Pt w/ history of UTI 2/2 suprapubic cath now removed 6/2023, recent admission for klebsiella and E coli UTI.  Chronic dysuria. UCx 7/2023 growing MSSA and E. faecalis, completed linezolid course. s/p linezolid 600mg x1 and ertapenem 1000mg x1 in ED. UA this admission with WBC >24.  - f/u UCx  - f/u BCx  - ID consult in AM    PROPHYLAXIS  F: NS +40meq K @150cc/hr  E: K>4 Mg>2  N: NPO  GI: None  DVT: Eliquis 5 BID  Dispo: MICU 43F PMH Asthma, CVA (11/2021; residual L>R weakness), PE (4/2023 on Eliquis), uncontrolled DM-2 (on insulin), HTN, HLD, hx abdominal necrotizing fasciitis (ostomy placement in 11/2021 with intubation from 11-12/2021),multiple abd hernia repairs, frequent UTIs, CAD s/p PCI presenting with worsening groin pain and erythema, found to have glucose >600 and AG in ED (now closed). Admitted to MICU for hyperglycemia requiring insulin gtt.    NEURO  #Pain control  - oxycodone 10mg q4hr prn    CARDIOVASCULAR  #CAD  #Stable Angina  Know history of CAD with multiple prior stents. Now complaining of CP intermittently at rest and with exertion, lightheadedness, and SOB. In 8/2023, pt admitted for tx of groin abscess however was transferred to cardiology for staged PCI. [facility] 06/26/23 w/ NAT x 1 pRCA (85%) and NAT x 1 RPLS (80%); residual dLCx/OM1 70%, mLAD 50%. staged PCI 08/01/2023 NAT x 1 mLCx/OM1 (70%), RCA: patent stents, LM: short segment, MLI, mLAD: 40%. Echo 06/23/23: Mild LVH. Hyperdynamic LVSF. Normal RV/RVSF. Admission EKG 12/31/23 stable, +sinus tachycardia.  - c/w plavix 75mg qd, eliquis 5mg BID, lipitor 80mg qd    #Essential HTN  Home med: metoprolol succinate ER 50mg qd  - c/w home med    PULMONARY  #History of PE  History of bilateral pulmonary emboli in segmental and subsegmental branches on the R and subsegmental branches on the L, diagnosed on CT chest during ED visit on 4/7/23. Home meds: Eliquis 5mg BID  - c/w Eliquis 5mg BID    GI  #h/o abdominal necrotizing fasciitis   #colostomy hernia  Pt presenting with groin pain and erythema, abd pain, n/v at home. Recently hospitalized at [facility] 8/2023 for L groin abscess and purulent cellulitis. H/o abdominal necrotizing fascitis s/p ostomy placement. Pt with pain around the ostomy, parastomal hernia palpated, tender to touch. Surgery consulted in ED. No intervention at this time.    RENAL  #NAGMA  Pt initially presenting with anion gap metabolic acidosis, found to have glc >600, AG 20. Lactate 6.9, VBG pH 7.44. AG closed after fluids, received 3L NS in ED. Lactate downtrending. BHB 0.4. Potassium 2.8.  - c/w agressive electrolyte repletion  - c/w NS +40meq K @150cc/hr    #Electrolyte abnormalities  K 2.8 on admission, slowly uptrending. Corrected Na ~138.  - c/w NS +40meq K @150cc/hr  - goal K >5.3  - q4 BMP w/ aggressive repletion of lytes  - daily EKG  - monitor I/Os    ENDO  #Hyperosmolar Hyperglycemic State  #Uncontrolled T2DM  Pt with h/o DM, endo consulted last admission, discharged w/ Humalog U 500 60 units TID and admelog 26 units TID. Inpatient regimen: lantus 50u BID, lispro 25u TID,  Noted to be hyperglycemic throughout last hospitalization. Now p/w abd pain, n/v at home, found to have glc >600. Initially w/ AGMA, however now closed x3. AG likely i/s/o lactic acidosis that resolved with fluid resuscitation. BHB 0.4. s/p 10u regular insulin in ED.   - endo consult in AM  - c/w NS +40meq K @150cc/hr  - c/w insulin gtt, gradual reduction of glc per hyperglycemia protocol  - q1hr fsg  - electrolyte repletion  - frequent neuro checks  - management of infection as below  - strict I/Os  - restart home insulin regimen when appropriate    ID  #Cellulitis  Presenting with worsening groin and lower abd erythema, on exam noted to have red irritated skin w/ desquamation. CTAP w/o abscess or fluid collection. s/p I&D of L labial abscess last admission. pt has multiple abx allergies.  - ID consult in AM  - f/u BCx  - f/u UCx  - c/w topical clotrimazole     #Frequent UTI  #L labial abscess  Pt w/ history of UTI 2/2 suprapubic cath now removed 6/2023, recent admission for klebsiella and E coli UTI.  Chronic dysuria. UCx 7/2023 growing MSSA and E. faecalis, completed linezolid course. s/p linezolid 600mg x1 and ertapenem 1000mg x1 in ED. UA this admission with WBC >24.  - f/u UCx  - f/u BCx  - ID consult in AM    PROPHYLAXIS  F: NS +40meq K @150cc/hr  E: K>4 Mg>2  N: NPO  GI: None  DVT: Eliquis 5 BID  Dispo: MICU 43F PMH Asthma, CVA (11/2021; residual L>R weakness), PE (4/2023 on Eliquis), uncontrolled DM-2 (on insulin), HTN, HLD, hx abdominal necrotizing fasciitis (ostomy placement in 11/2021 with intubation from 11-12/2021),multiple abd hernia repairs, frequent UTIs, CAD s/p PCI presenting with worsening groin pain and erythema, found to have glucose >600 and AG in ED (now closed). Admitted to MICU for hyperglycemia requiring insulin gtt.    NEURO  #Pain control  - oxycodone 10mg q4hr prn    CARDIOVASCULAR  #CAD  #Stable Angina  Know history of CAD with multiple prior stents. Now complaining of CP intermittently at rest and with exertion, lightheadedness, and SOB. In 8/2023, pt admitted for tx of groin abscess however was transferred to cardiology for staged PCI. [facility] 06/26/23 w/ NAT x 1 pRCA (85%) and NAT x 1 RPLS (80%); residual dLCx/OM1 70%, mLAD 50%. staged PCI 08/01/2023 NAT x 1 mLCx/OM1 (70%), RCA: patent stents, LM: short segment, MLI, mLAD: 40%. Echo 06/23/23: Mild LVH. Hyperdynamic LVSF. Normal RV/RVSF. Admission EKG 12/31/23 stable, +sinus tachycardia.  - c/w plavix 75mg qd, eliquis 5mg BID, lipitor 80mg qd    #Essential HTN  Home med: metoprolol succinate ER 50mg qd  - c/w home med    PULMONARY  #History of PE  History of bilateral pulmonary emboli in segmental and subsegmental branches on the R and subsegmental branches on the L, diagnosed on CT chest during ED visit on 4/7/23. Home meds: Eliquis 5mg BID  - c/w Eliquis 5mg BID    GI  #h/o abdominal necrotizing fasciitis   #colostomy hernia  Pt presenting with groin pain and erythema, abd pain, n/v at home. Recently hospitalized at [facility] 8/2023 for L groin abscess and purulent cellulitis. H/o abdominal necrotizing fascitis s/p ostomy placement. Pt with pain around the ostomy, parastomal hernia palpated, tender to touch. Surgery consulted in ED. No intervention at this time.    RENAL  #NAGMA  Pt initially presenting with anion gap metabolic acidosis, found to have glc >600, AG 20. Lactate 6.9, VBG pH 7.44. AG closed after fluids, received 3L NS in ED. Lactate downtrending. BHB 0.4. Potassium 2.8.  - c/w agressive electrolyte repletion  - c/w NS +40meq K @150cc/hr    #Electrolyte abnormalities  K 2.8 on admission, slowly uptrending. Corrected Na ~138.  - c/w NS +40meq K @150cc/hr  - goal K >5.3  - q4 BMP w/ aggressive repletion of lytes  - daily EKG  - monitor I/Os    ENDO  #Hyperglycemia  #Uncontrolled T2DM  Pt with h/o DM, endo consulted last admission, discharged w/ Humalog U 500 60 units TID and admelog 26 units TID. Inpatient regimen: lantus 50u BID, lispro 25u TID,  Noted to be hyperglycemic throughout last hospitalization. Now p/w abd pain, n/v at home, found to have glc >600. Initially w/ AGMA, however now closed x3. AG likely i/s/o lactic acidosis that resolved with fluid resuscitation. BHB 0.4. s/p 10u regular insulin in ED.   - endo consult in AM  - c/w NS +40meq K @150cc/hr  - c/w insulin gtt, gradual reduction of glc per hyperglycemia protocol  - q1hr fsg  - electrolyte repletion  - frequent neuro checks  - management of infection as below  - strict I/Os  - restart home insulin regimen when appropriate  - NPO for now. Patient noted to be non-adherent with NPO order despite multiple education attempts by nursing and medical team.  Observed drinking juices brought in by visitor.    ID  #Cellulitis  Presenting with worsening groin and lower abd erythema, on exam noted to have red irritated skin w/ desquamation. CTAP w/o abscess or fluid collection. s/p I&D of L labial abscess last admission. pt has multiple abx allergies.  - ID consult in AM  - f/u BCx  - f/u UCx  - c/w topical clotrimazole     #Frequent UTI  #L labial abscess  Pt w/ history of UTI 2/2 suprapubic cath now removed 6/2023, recent admission for klebsiella and E coli UTI.  Chronic dysuria. UCx 7/2023 growing MSSA and E. faecalis, completed linezolid course. s/p linezolid 600mg x1 and ertapenem 1000mg x1 in ED. UA this admission with WBC >24.  - f/u UCx  - f/u BCx  - ID consult in AM    PROPHYLAXIS  F: NS +40meq K @150cc/hr  E: K>4 Mg>2  N: NPO  GI: None  DVT: Eliquis 5 BID  Dispo: MICU 43F PMH Asthma, CVA (11/2021; residual L>R weakness), PE (4/2023 on Eliquis), uncontrolled DM-2 (on insulin), HTN, HLD, hx abdominal necrotizing fasciitis (ostomy placement in 11/2021 with intubation from 11-12/2021),multiple abd hernia repairs, frequent UTIs, CAD s/p PCI presenting with worsening groin pain and erythema, found to have glucose >600 and AG in ED (now closed). Admitted to MICU for hyperglycemia requiring insulin gtt.    NEURO  #Pain control  - oxycodone 10mg q4hr prn    CARDIOVASCULAR  #CAD  #Stable Angina  Know history of CAD with multiple prior stents. Now complaining of CP intermittently at rest and with exertion, lightheadedness, and SOB. In 8/2023, pt admitted for tx of groin abscess however was transferred to cardiology for staged PCI. [facility] 06/26/23 w/ NAT x 1 pRCA (85%) and NAT x 1 RPLS (80%); residual dLCx/OM1 70%, mLAD 50%. staged PCI 08/01/2023 NAT x 1 mLCx/OM1 (70%), RCA: patent stents, LM: short segment, MLI, mLAD: 40%. Echo 06/23/23: Mild LVH. Hyperdynamic LVSF. Normal RV/RVSF. Admission EKG 12/31/23 stable, +sinus tachycardia.  - c/w plavix 75mg qd, eliquis 5mg BID, lipitor 80mg qd    #Essential HTN  Home med: metoprolol succinate ER 50mg qd  - c/w home med    PULMONARY  #History of PE  History of bilateral pulmonary emboli in segmental and subsegmental branches on the R and subsegmental branches on the L, diagnosed on CT chest during ED visit on 4/7/23. Home meds: Eliquis 5mg BID  - c/w Eliquis 5mg BID    GI  #h/o abdominal necrotizing fasciitis   #colostomy hernia  Pt presenting with groin pain and erythema, abd pain, n/v at home. Recently hospitalized at [facility] 8/2023 for L groin abscess and purulent cellulitis. H/o abdominal necrotizing fascitis s/p ostomy placement. Pt with pain around the ostomy, parastomal hernia palpated, tender to touch. Surgery consulted in ED. No intervention at this time.    RENAL  #NAGMA  Pt initially presenting with anion gap metabolic acidosis, found to have glc >600, AG 20. Lactate 6.9, VBG pH 7.44. AG closed after fluids, received 3L NS in ED. Lactate downtrending. BHB 0.4. Potassium 2.8.  - c/w agressive electrolyte repletion  - c/w NS +40meq K @150cc/hr    #Electrolyte abnormalities  K 2.8 on admission, slowly uptrending. Corrected Na ~138.  - c/w NS +40meq K @150cc/hr  - goal K >5.3  - q4 BMP w/ aggressive repletion of lytes  - daily EKG  - monitor I/Os    ENDO  #Hyperglycemia  #Uncontrolled T2DM  Pt with h/o DM, endo consulted last admission, discharged w/ Humalog U 500 60 units TID and admelog 26 units TID. Inpatient regimen: lantus 50u BID, lispro 25u TID,  Noted to be hyperglycemic throughout last hospitalization. Now p/w abd pain, n/v at home, found to have glc >600. Initially w/ AGMA, however now closed x3. AG likely i/s/o lactic acidosis that resolved with fluid resuscitation. BHB 0.4. s/p 10u regular insulin in ED.   - endo consult in AM  - c/w NS +40meq K @150cc/hr  - c/w insulin gtt, gradual reduction of glc per hyperglycemia protocol  - q1hr fsg  - electrolyte repletion  - frequent neuro checks  - management of infection as below  - strict I/Os  - restart home insulin regimen when appropriate  - NPO for now. Patient noted to be non-adherent with NPO order despite multiple education attempts by nursing and medical team.  Observed drinking juices brought in by visitor.    ID  #Cellulitis  Presenting with worsening groin and lower abd erythema, on exam noted to have red irritated skin w/ desquamation. CTAP w/o abscess or fluid collection. s/p I&D of L labial abscess last admission. pt has multiple abx allergies.  - ID consult in AM  - f/u BCx  - f/u UCx  - c/w topical clotrimazole     #Frequent UTI  #L labial abscess  Pt w/ history of UTI 2/2 suprapubic cath now removed 6/2023, recent admission for klebsiella and E coli UTI.  Chronic dysuria. UCx 7/2023 growing MSSA and E. faecalis, completed linezolid course. s/p linezolid 600mg x1 and ertapenem 1000mg x1 in ED. UA this admission with WBC >24.  - f/u UCx  - f/u BCx  - ID consult in AM    PROPHYLAXIS  F: NS +40meq K @150cc/hr  E: K>4 Mg>2  N: NPO  GI: None  DVT: Eliquis 5 BID  Dispo: MICU

## 2024-01-01 NOTE — CONSULT NOTE ADULT - SUBJECTIVE AND OBJECTIVE BOX
Patient is a 43y old  Female who presents with a chief complaint of Hyperglycemia (2024 07:16)    HPI:  43F PMH Asthma, CVA (2021; residual L>R weakness), PE (2023 on Eliquis), uncontrolled DM-2 (on insulin), HTN, HLD, hx abdominal necrotizing fasciitis (ostomy placement in 2021 with intubation from -2021),multiple abd hernia repairs, hx of frequent UTIs 2/2 to suprapubic catheter (removed in 2023), most recent UTI c/b klebsiella and e coli urosepsis s/p ertapenem course, PCI s/p NAT 2023 and 2023, recent 2023 [facility] admission for L labial abscess now presenting to ED with L groin swelling that has been progressively increasing over the the past 3 weeks, now with worsening discomfort at site however without any discharge or drainage. Pt is also complaining of intermittent nonpleuritic chest pain, lightheadedness, and SOB occasionally at rest and with exertion x3 weeks. She also reports intermittent nausea/vomiting, and abdominal discomfort over the last several weeks. Pt denies fevers, chills, night sweats at home.     ED COURSE  VS T 98.3  /91 SpO2 100% on RA  LABS ESR 42 WBC 9.94 Hgb 13.1 ddimer 363 Na 125 K 2.8 Cl 83 AG 20 Glc 654 Lactate 6.9 CRP 14.8  VBG pH 7.44  UA SG >1.030 WBC 24 Bacteria Moderate Small LE, +yeast like cells, +WBC clumps, >1000 glc  IMAGING CT LE w/o nec fasc or fluid collection, CTAP +hepatic steatosis, +L adnexal lesion, unchanged adrenal nodule CTA w/o PE  INTERVENTIONS Benadyl 50mg IVP, ertapenem 1g, dilaudid 1mg x2, Insulin regular 10u, Mg 2g, solumedrol 125mg, zofran 4mg IVP, KCl 40 x2, KCl 10mEq x3, NS 1000cc x3  CONSULTS Surgery, ICU (2024 00:54)      Allergies    iodine containing compounds (Hives; Anaphylaxis)  clindamycin (Pruritus)  fish (Hives; Urticaria)  vancomycin (Anaphylaxis; Hives)  penicillin (Hives)  shellfish. (Hives; Anaphylaxis)  amoxicillin (Short breath; Rash)    Intolerances    Bactrim I.V. (Rash (Mod to Severe))    Home Medications:  acetaminophen 325 mg oral tablet: 3 tab(s) orally every 8 hours (2023 17:49)      SOCIAL HX:     Smoking          ETOH/Illicit drugs          Occupation    PAST MEDICAL & SURGICAL HISTORY:  Essential hypertension      Hyperlipidemia      Obesity      Abdominal hernia      Pre-eclampsia      Pulmonary embolism      Type 2 diabetes mellitus      History of necrotizing fasciitis      History of creation of ostomy      Suprapubic catheter      H/O: CVA (cerebrovascular accident)      Sepsis, unspecified organism      H/O LEEP      History of D&C      H/O:       H/O ventral hernia repair      H/O exploratory laparotomy      History of creation of ostomy          FAMILY HISTORY:  FH: diabetes mellitus  father and mother    FH: hypertension  father and mother    :    No known cardiovascular or pulmonary family history     ROS:  See HPI     PHYSICAL EXAM    ICU Vital Signs Last 24 Hrs  T(C): 36.4 (2024 06:04), Max: 36.8 (31 Dec 2023 11:52)  T(F): 97.6 (2024 06:04), Max: 98.3 (31 Dec 2023 11:52)  HR: 88 (2024 07:00) (67 - 125)  BP: 127/91 (2024 07:00) (127/91 - 193/95)  BP(mean): 105 (2024 07:00) (91 - 136)  ABP: --  ABP(mean): --  RR: 25 (2024 07:00) (18 - 39)  SpO2: 96% (2024 07:00) (95% - 117%)    O2 Parameters below as of 2024 08:00  Patient On (Oxygen Delivery Method): room air    General: NAD  Head: pupils reactive  Neck: Supple; no JVD  Respiratory: CTAB; no wheezes/rales/rhonchi  Cardiovascular: +tachy rate, regular rhythm, S1/S2+, no murmurs, rubs gallops   Gastrointestinal: Soft, nondistended, +parastomal hernia, +tenderness and erythema around ostomy site, +erythema in L abdomen and b/l groin w/ desquamation of skin, no crepitus, fluctuance or drainage noted  Extremities: WWP; no edema/cyanosis  Neurological: A&Ox3, CNII-XII grossly intact; no obvious focal deficits    23 @ 07:01  -  24 @ 07:00  --------------------------------------------------------  IN:    Insulin: 40 mL    Insulin: 20 mL    Insulin: 16 mL    Insulin: 12 mL    IV PiggyBack: 300 mL    sodium chloride 0.9% w/ Additives: 750 mL    sodium chloride 0.9% w/ Additives: 400 mL  Total IN: 1538 mL    OUT:    Voided (mL): 2600 mL  Total OUT: 2600 mL    Total NET: -1062 mL      24 @ 07:24 @ 07:28  --------------------------------------------------------  IN:    sodium chloride 0.9% w/ Additives: 200 mL  Total IN: 200 mL    OUT:  Total OUT: 0 mL    Total NET: 200 mL          LABS:                          12.8   12.87 )-----------( 318      ( 2024 04:36 )             41.7                                                   130<L>  |  96  |  10  ----------------------------<  420<H>  4.2   |  16<L>  |  0.61    Ca    8.3<L>      2024 04:33  Phos  1.1       Mg     1.4         TPro  7.7  /  Alb  3.5  /  TBili  0.3  /  DBili  0.2  /  AST  18  /  ALT  15  /  AlkPhos  187<H>        PT/INR - ( 31 Dec 2023 12:46 )   PT: 11.5 sec;   INR: 1.01          PTT - ( 31 Dec 2023 12:46 )  PTT:28.8 sec                                       Urinalysis Basic - ( 2024 04:33 )    Color: x / Appearance: x / SG: x / pH: x  Gluc: 420 mg/dL / Ketone: x  / Bili: x / Urobili: x   Blood: x / Protein: x / Nitrite: x   Leuk Esterase: x / RBC: x / WBC x   Sq Epi: x / Non Sq Epi: x / Bacteria: x        CARDIAC MARKERS ( 31 Dec 2023 12:46 )  x     / x     / 32 U/L / x     / 2.3 ng/mL                                            LIVER FUNCTIONS - ( 31 Dec 2023 12:46 )  Alb: 3.5 g/dL / Pro: 7.7 g/dL / ALK PHOS: 187 U/L / ALT: 15 U/L / AST: 18 U/L / GGT: x                                                  Culture - Blood (collected 31 Dec 2023 12:46)  Source: .Blood Blood  Preliminary Report (2024 03:00):    No growth at 12 hours    Culture - Blood (collected 31 Dec 2023 12:46)  Source: .Blood Blood  Preliminary Report (2024 03:00):    No growth at 12 hours                                                                                           CXR:    ECHO:    MEDICATIONS  (STANDING):  apixaban 5 milliGRAM(s) Oral every 12 hours  atorvastatin 80 milliGRAM(s) Oral at bedtime  chlorhexidine 2% Cloths 1 Application(s) Topical <User Schedule>  clopidogrel Tablet 75 milliGRAM(s) Oral daily  clotrimazole 1% Cream 1 Application(s) Topical two times a day  insulin regular Infusion 12 Unit(s)/Hr (12 mL/Hr) IV Continuous <Continuous>  metoprolol succinate ER 50 milliGRAM(s) Oral daily  potassium chloride    Tablet ER 40 milliEquivalent(s) Oral every 4 hours  sodium chloride 0.9% 1000 milliLiter(s) (200 mL/Hr) IV Continuous <Continuous>    MEDICATIONS  (PRN):  oxyCODONE    IR 10 milliGRAM(s) Oral every 4 hours PRN Severe Pain (7 - 10)         Patient is a 43y old  Female who presents with a chief complaint of Hyperglycemia (2024 07:16)    HPI:  43F PMH Asthma, CVA (2021; residual L>R weakness), PE (2023 on Eliquis), uncontrolled DM-2 (on insulin), HTN, HLD, hx abdominal necrotizing fasciitis (ostomy placement in 2021 with intubation from -2021),multiple abd hernia repairs, hx of frequent UTIs 2/2 to suprapubic catheter (removed in 2023), most recent UTI c/b klebsiella and e coli urosepsis s/p ertapenem course, PCI s/p NAT 2023 and 2023, recent 2023 [facility] admission for L labial abscess now presenting to ED with L groin swelling that has been progressively increasing over the the past 3 weeks, now with worsening discomfort at site however without any discharge or drainage. Pt is also complaining of intermittent nonpleuritic chest pain, lightheadedness, and SOB occasionally at rest and with exertion x3 weeks. She also reports intermittent nausea/vomiting, and abdominal discomfort over the last several weeks. Pt denies fevers, chills, night sweats at home.     ED COURSE  VS T 98.3  /91 SpO2 100% on RA  LABS ESR 42 WBC 9.94 Hgb 13.1 ddimer 363 Na 125 K 2.8 Cl 83 AG 20 Glc 654 Lactate 6.9 CRP 14.8  VBG pH 7.44  UA SG >1.030 WBC 24 Bacteria Moderate Small LE, +yeast like cells, +WBC clumps, >1000 glc  IMAGING CT LE w/o nec fasc or fluid collection, CTAP +hepatic steatosis, +L adnexal lesion, unchanged adrenal nodule CTA w/o PE  INTERVENTIONS Benadyl 50mg IVP, ertapenem 1g, dilaudid 1mg x2, Insulin regular 10u, Mg 2g, solumedrol 125mg, zofran 4mg IVP, KCl 40 x2, KCl 10mEq x3, NS 1000cc x3  CONSULTS Surgery, ICU (2024 00:54)      Allergies    iodine containing compounds (Hives; Anaphylaxis)  clindamycin (Pruritus)  fish (Hives; Urticaria)  vancomycin (Anaphylaxis; Hives)  penicillin (Hives)  shellfish. (Hives; Anaphylaxis)  amoxicillin (Short breath; Rash)    Intolerances    Bactrim I.V. (Rash (Mod to Severe))    Home Medications:  acetaminophen 325 mg oral tablet: 3 tab(s) orally every 8 hours (2023 17:49)      SOCIAL HX:     Smoking          ETOH/Illicit drugs          Occupation    PAST MEDICAL & SURGICAL HISTORY:  Essential hypertension      Hyperlipidemia      Obesity      Abdominal hernia      Pre-eclampsia      Pulmonary embolism      Type 2 diabetes mellitus      History of necrotizing fasciitis      History of creation of ostomy      Suprapubic catheter      H/O: CVA (cerebrovascular accident)      Sepsis, unspecified organism      H/O LEEP      History of D&C      H/O:       H/O ventral hernia repair      H/O exploratory laparotomy      History of creation of ostomy          FAMILY HISTORY:  FH: diabetes mellitus  father and mother    FH: hypertension  father and mother    :    No known cardiovascular or pulmonary family history     ROS:  See HPI     PHYSICAL EXAM    ICU Vital Signs Last 24 Hrs  T(C): 36.4 (2024 06:04), Max: 36.8 (31 Dec 2023 11:52)  T(F): 97.6 (2024 06:04), Max: 98.3 (31 Dec 2023 11:52)  HR: 88 (2024 07:00) (67 - 125)  BP: 127/91 (2024 07:00) (127/91 - 193/95)  BP(mean): 105 (2024 07:00) (91 - 136)  ABP: --  ABP(mean): --  RR: 25 (2024 07:00) (18 - 39)  SpO2: 96% (2024 07:00) (95% - 117%)    O2 Parameters below as of 2024 08:00  Patient On (Oxygen Delivery Method): room air    General: NAD  Head: pupils reactive  Neck: Supple; no JVD  Respiratory: CTAB; no wheezes/rales/rhonchi  Cardiovascular: +tachy rate, regular rhythm, S1/S2+, no murmurs, rubs gallops   Gastrointestinal: Soft, nondistended, +parastomal hernia, +tenderness and erythema around ostomy site, +erythema in L abdomen and b/l groin w/ desquamation of skin, no crepitus, fluctuance or drainage noted  Extremities: WWP; no edema/cyanosis  Neurological: A&Ox3, CNII-XII grossly intact; no obvious focal deficits    23 @ 07:01  -  24 @ 07:00  --------------------------------------------------------  IN:    Insulin: 40 mL    Insulin: 20 mL    Insulin: 16 mL    Insulin: 12 mL    IV PiggyBack: 300 mL    sodium chloride 0.9% w/ Additives: 750 mL    sodium chloride 0.9% w/ Additives: 400 mL  Total IN: 1538 mL    OUT:    Voided (mL): 2600 mL  Total OUT: 2600 mL    Total NET: -1062 mL      24 @ 07:24 @ 07:28  --------------------------------------------------------  IN:    sodium chloride 0.9% w/ Additives: 200 mL  Total IN: 200 mL    OUT:  Total OUT: 0 mL    Total NET: 200 mL          LABS:                          12.8   12.87 )-----------( 318      ( 2024 04:36 )             41.7                                                   130<L>  |  96  |  10  ----------------------------<  420<H>  4.2   |  16<L>  |  0.61    Ca    8.3<L>      2024 04:33  Phos  1.1       Mg     1.4         TPro  7.7  /  Alb  3.5  /  TBili  0.3  /  DBili  0.2  /  AST  18  /  ALT  15  /  AlkPhos  187<H>        PT/INR - ( 31 Dec 2023 12:46 )   PT: 11.5 sec;   INR: 1.01          PTT - ( 31 Dec 2023 12:46 )  PTT:28.8 sec                                       Urinalysis Basic - ( 2024 04:33 )    Color: x / Appearance: x / SG: x / pH: x  Gluc: 420 mg/dL / Ketone: x  / Bili: x / Urobili: x   Blood: x / Protein: x / Nitrite: x   Leuk Esterase: x / RBC: x / WBC x   Sq Epi: x / Non Sq Epi: x / Bacteria: x        CARDIAC MARKERS ( 31 Dec 2023 12:46 )  x     / x     / 32 U/L / x     / 2.3 ng/mL                                            LIVER FUNCTIONS - ( 31 Dec 2023 12:46 )  Alb: 3.5 g/dL / Pro: 7.7 g/dL / ALK PHOS: 187 U/L / ALT: 15 U/L / AST: 18 U/L / GGT: x                                                  Culture - Blood (collected 31 Dec 2023 12:46)  Source: .Blood Blood  Preliminary Report (2024 03:00):    No growth at 12 hours    Culture - Blood (collected 31 Dec 2023 12:46)  Source: .Blood Blood  Preliminary Report (2024 03:00):    No growth at 12 hours                                                                                           CXR:    ECHO:    MEDICATIONS  (STANDING):  apixaban 5 milliGRAM(s) Oral every 12 hours  atorvastatin 80 milliGRAM(s) Oral at bedtime  chlorhexidine 2% Cloths 1 Application(s) Topical <User Schedule>  clopidogrel Tablet 75 milliGRAM(s) Oral daily  clotrimazole 1% Cream 1 Application(s) Topical two times a day  insulin regular Infusion 12 Unit(s)/Hr (12 mL/Hr) IV Continuous <Continuous>  metoprolol succinate ER 50 milliGRAM(s) Oral daily  potassium chloride    Tablet ER 40 milliEquivalent(s) Oral every 4 hours  sodium chloride 0.9% 1000 milliLiter(s) (200 mL/Hr) IV Continuous <Continuous>    MEDICATIONS  (PRN):  oxyCODONE    IR 10 milliGRAM(s) Oral every 4 hours PRN Severe Pain (7 - 10)

## 2024-01-01 NOTE — CONSULT NOTE ADULT - ASSESSMENT
3F PMH Asthma, CVA (11/2021; residual L>R weakness), PE (4/2023 on Eliquis), uncontrolled DM-2 (on insulin), HTN, HLD, hx abdominal necrotizing fasciitis (ostomy placement in 11/2021 with intubation from 11-12/2021),multiple abd hernia repairs, frequent UTIs, CAD s/p PCI presenting with worsening groin pain and erythema, found to have glucose >600 and AG in ED (now closed). Admitted to MICU for hyperglycemia requiring insulin gtt.    NEURO  #Pain control  - oxycodone 10mg q4hr prn    CARDIOVASCULAR  #CAD  #Stable Angina  Know history of CAD with multiple prior stents. Now complaining of CP intermittently at rest and with exertion, lightheadedness, and SOB. In 8/2023, pt admitted for tx of groin abscess however was transferred to cardiology for staged PCI. [facility] 06/26/23 w/ NAT x 1 pRCA (85%) and NAT x 1 RPLS (80%); residual dLCx/OM1 70%, mLAD 50%. staged PCI 08/01/2023 NAT x 1 mLCx/OM1 (70%), RCA: patent stents, LM: short segment, MLI, mLAD: 40%. Echo 06/23/23: Mild LVH. Hyperdynamic LVSF. Normal RV/RVSF. Admission EKG 12/31/23 stable, +sinus tachycardia.  - c/w plavix 75mg qd, eliquis 5mg BID, lipitor 80mg qd    #Essential HTN  Home med: metoprolol succinate ER 50mg qd  - c/w home med    PULMONARY  #History of PE  History of bilateral pulmonary emboli in segmental and subsegmental branches on the R and subsegmental branches on the L, diagnosed on CT chest during ED visit on 4/7/23. Home meds: Eliquis 5mg BID  - c/w Eliquis 5mg BID    GI  #h/o abdominal necrotizing fasciitis   #colostomy hernia  Pt presenting with groin pain and erythema, abd pain, n/v at home. Recently hospitalized at [facility] 8/2023 for L groin abscess and purulent cellulitis. H/o abdominal necrotizing fascitis s/p ostomy placement. Pt with pain around the ostomy, parastomal hernia palpated, tender to touch. Surgery consulted in ED. No intervention at this time.    RENAL  #NAGMA  Pt initially presenting with anion gap metabolic acidosis, found to have glc >600, AG 20. Lactate 6.9, VBG pH 7.44. AG closed after fluids, received 3L NS in ED. Lactate downtrending. BHB 0.4. Potassium 2.8.  - c/w agressive electrolyte repletion  - c/w NS +40meq K @150cc/hr    #Electrolyte abnormalities  K 2.8 on admission, slowly uptrending. Corrected Na ~138.  - c/w NS +40meq K @150cc/hr  - goal K >5.3  - q4 BMP w/ aggressive repletion of lytes  - daily EKG  - monitor I/Os    ENDO  #Hyperglycemia  #Uncontrolled T2DM  Pt with h/o DM, endo consulted last admission, discharged w/ Humalog U 500 60 units TID and admelog 26 units TID. Inpatient regimen: lantus 50u BID, lispro 25u TID,  Noted to be hyperglycemic throughout last hospitalization. Now p/w abd pain, n/v at home, found to have glc >600. Initially w/ AGMA, however now closed x3. AG likely i/s/o lactic acidosis that resolved with fluid resuscitation. BHB 0.4. s/p 10u regular insulin in ED.   - endo consult in AM  - c/w NS +40meq K @150cc/hr  - c/w insulin gtt, gradual reduction of glc per hyperglycemia protocol  - q1hr fsg  - electrolyte repletion  - frequent neuro checks  - management of infection as below  - strict I/Os  - restart home insulin regimen when appropriate  - NPO for now. Patient noted to be non-adherent with NPO order despite multiple education attempts by nursing and medical team.  Observed drinking juices brought in by visitor.    ID  #Cellulitis  Presenting with worsening groin and lower abd erythema, on exam noted to have red irritated skin w/ desquamation. CTAP w/o abscess or fluid collection. s/p I&D of L labial abscess last admission. pt has multiple abx allergies.  - ID consult in AM  - f/u BCx  - f/u UCx  - c/w topical clotrimazole     #Frequent UTI  #L labial abscess  Pt w/ history of UTI 2/2 suprapubic cath now removed 6/2023, recent admission for klebsiella and E coli UTI.  Chronic dysuria. UCx 7/2023 growing MSSA and E. faecalis, completed linezolid course. s/p linezolid 600mg x1 and ertapenem 1000mg x1 in ED. UA this admission with WBC >24.  - f/u UCx  - f/u BCx  - ID consult in AM    PROPHYLAXIS  F: NS +40meq K @150cc/hr  E: K>4 Mg>2  N: NPO  GI: None  DVT: Eliquis 5 BID  Dispo: MICU   3F PMH Asthma, CVA (11/2021; residual L>R weakness), PE (4/2023 on Eliquis), uncontrolled DM-2 (on insulin), HTN, HLD, hx abdominal necrotizing fasciitis (ostomy placement in 11/2021 with intubation from 11-12/2021),multiple abd hernia repairs, frequent UTIs, CAD s/p PCI presenting with worsening groin pain and erythema, found to have glucose >600 and AG in ED (now closed). Admitted to MICU for hyperglycemia requiring insulin gtt.    NEURO  #Pain control  - oxycodone 10mg q4hr prn    CARDIOVASCULAR  #CAD  #Stable Angina  Know history of CAD with multiple prior stents. Now complaining of CP intermittently at rest and with exertion, lightheadedness, and SOB. In 8/2023, pt admitted for tx of groin abscess however was transferred to cardiology for staged PCI. [facility] 06/26/23 w/ NAT x 1 pRCA (85%) and NAT x 1 RPLS (80%); residual dLCx/OM1 70%, mLAD 50%. staged PCI 08/01/2023 NAT x 1 mLCx/OM1 (70%), RCA: patent stents, LM: short segment, MLI, mLAD: 40%. Echo 06/23/23: Mild LVH. Hyperdynamic LVSF. Normal RV/RVSF. Admission EKG 12/31/23 stable, +sinus tachycardia.  - c/w plavix 75mg qd, eliquis 5mg BID, lipitor 80mg qd    #Essential HTN  Home med: metoprolol succinate ER 50mg qd  - c/w home med    PULMONARY  #History of PE  History of bilateral pulmonary emboli in segmental and subsegmental branches on the R and subsegmental branches on the L, diagnosed on CT chest during ED visit on 4/7/23. Home meds: Eliquis 5mg BID  - c/w Eliquis 5mg BID    GI  #h/o abdominal necrotizing fasciitis   #colostomy hernia  Pt presenting with groin pain and erythema, abd pain, n/v at home. Recently hospitalized at [facility] 8/2023 for L groin abscess and purulent cellulitis. H/o abdominal necrotizing fascitis s/p ostomy placement. Pt with pain around the ostomy, parastomal hernia palpated, tender to touch. Surgery consulted in ED. No intervention at this time.    RENAL  #NAGMA  Pt initially presenting with anion gap metabolic acidosis, found to have glc >600, AG 20. Lactate 6.9, VBG pH 7.44. AG closed after fluids, received 3L NS in ED. Lactate downtrending. BHB 0.4. Potassium 2.8.  - c/w agressive electrolyte repletion  - c/w NS +40meq K @150cc/hr    #Electrolyte abnormalities  K 2.8 on admission, slowly uptrending. Corrected Na ~138.  - c/w NS +40meq K @150cc/hr  - goal K >5.3  - q4 BMP w/ aggressive repletion of lytes  - daily EKG  - monitor I/Os    ENDO  #Hyperglycemia  #Uncontrolled T2DM  Pt with h/o DM, endo consulted last admission, discharged w/ Humalog U 500 60 units TID and admelog 26 units TID. Inpatient regimen: lantus 50u BID, lispro 25u TID,  Noted to be hyperglycemic throughout last hospitalization. Now p/w abd pain, n/v at home, found to have glc >600. Initially w/ AGMA, however now closed x3. AG likely i/s/o lactic acidosis that resolved with fluid resuscitation. BHB 0.4. s/p 10u regular insulin in ED.   - endo consult in AM  - c/w NS +40meq K @150cc/hr  - c/w insulin gtt, gradual reduction of glc per hyperglycemia protocol  - q1hr fsg  - electrolyte repletion  - frequent neuro checks  - management of infection as below  - strict I/Os  - restart home insulin regimen when appropriate  - NPO for now. Patient noted to be non-adherent with NPO order despite multiple education attempts by nursing and medical team.  Observed drinking juices brought in by visitor.    ID  #Cellulitis  Presenting with worsening groin and lower abd erythema, on exam noted to have red irritated skin w/ desquamation. CTAP w/o abscess or fluid collection. s/p I&D of L labial abscess last admission. pt has multiple abx allergies.  - ID consult in AM  - f/u BCx  - f/u UCx  - c/w topical clotrimazole     #Frequent UTI  #L labial abscess  Pt w/ history of UTI 2/2 suprapubic cath now removed 6/2023, recent admission for klebsiella and E coli UTI.  Chronic dysuria. UCx 7/2023 growing MSSA and E. faecalis, completed linezolid course. s/p linezolid 600mg x1 and ertapenem 1000mg x1 in ED. UA this admission with WBC >24.  - f/u UCx  - f/u BCx  - ID consult in AM    PROPHYLAXIS  F: NS +40meq K @150cc/hr  E: K>4 Mg>2  N: NPO  GI: None  DVT: Eliquis 5 BID  Dispo: MICU

## 2024-01-01 NOTE — H&P ADULT - ATTENDING COMMENTS
Patient is a 44 yo F w/ PMHx of Asthma, CVA (11/2021; residual L>R weakness), PE (4/2023 on Eliquis), uncontrolled DM-2 (on insulin), HTN, HLD, hx abdominal necrotizing fasciitis (ostomy placement in 11/2021 with intubation from 11-12/2021), multiple abd hernia repairs, frequent UTIs, CAD s/p PCI presenting with worsening groin pain and erythema, found to have glucose >600 and AGMA. Anion gap likely 2/2 elevated lactic acidosis as this resolved with improvement in serum lactate. Does not meet criteria for DKA or HHS. She has difficult to control DM, reports using 258 U total per day with continued poor control. Will admit to MICU and start insulin guttae, consult endocrine in AM for transition to basal bolus regimen. She meets SIRS criteria with tachycardia and tachypnea, with likely source of R groin cellulitis given erythema and TTP, vs UTI with elevated WBC on UA. CTA shows no signs of abscess or DTI. Will start linezolid and ertapenem for now, consult ID in AM.    Additional workup and management as documented by resident above. Patient is a 42 yo F w/ PMHx of Asthma, CVA (11/2021; residual L>R weakness), PE (4/2023 on Eliquis), uncontrolled DM-2 (on insulin), HTN, HLD, hx abdominal necrotizing fasciitis (ostomy placement in 11/2021 with intubation from 11-12/2021), multiple abd hernia repairs, frequent UTIs, CAD s/p PCI presenting with worsening groin pain and erythema, found to have glucose >600 and AGMA. Anion gap likely 2/2 elevated lactic acidosis as this resolved with improvement in serum lactate. Does not meet criteria for DKA or HHS. She has difficult to control DM, reports using 258 U total per day with continued poor control. Will admit to MICU and start insulin guttae, consult endocrine in AM for transition to basal bolus regimen. She meets SIRS criteria with tachycardia and tachypnea, with likely source of R groin cellulitis given erythema and TTP, vs UTI with elevated WBC on UA. CTA shows no signs of abscess or DTI. Will start linezolid and ertapenem for now, consult ID in AM.    Additional workup and management as documented by resident above. Patient is a 42 yo F w/ PMHx of Asthma, CVA (11/2021; residual L>R weakness), PE (4/2023 on Eliquis), uncontrolled DM-2 (on insulin), HTN, HLD, hx abdominal necrotizing fasciitis (ostomy placement in 11/2021 with intubation from 11-12/2021), multiple abd hernia repairs, frequent UTIs, CAD s/p PCI presenting with worsening groin pain and erythema, found to have glucose >600 and AGMA. Anion gap likely 2/2 elevated lactic acidosis as this resolved with improvement in serum lactate. Does not meet criteria for DKA or HHS. She has difficult to control DM, reports using 258 U total per day with continued poor control. Will admit to MICU and start insulin guttae, closely monitor electrolytes, consult endocrine in AM for transition to basal bolus regimen. She meets SIRS criteria with tachycardia and tachypnea, with likely source of R groin cellulitis given erythema and TTP, vs UTI with elevated WBC on UA. CT shows no signs of abscess or DTI. No crepitus or fluctuance on exam. Will start linezolid and ertapenem for now, consult ID in AM. Surgery consulted, recommending no surgical intervention at this time.    Additional workup and management as documented by resident above.

## 2024-01-02 DIAGNOSIS — K13.0 DISEASES OF LIPS: ICD-10-CM

## 2024-01-02 DIAGNOSIS — I25.10 ATHEROSCLEROTIC HEART DISEASE OF NATIVE CORONARY ARTERY WITHOUT ANGINA PECTORIS: ICD-10-CM

## 2024-01-02 DIAGNOSIS — N39.0 URINARY TRACT INFECTION, SITE NOT SPECIFIED: ICD-10-CM

## 2024-01-02 DIAGNOSIS — Z29.9 ENCOUNTER FOR PROPHYLACTIC MEASURES, UNSPECIFIED: ICD-10-CM

## 2024-01-02 DIAGNOSIS — Z86.711 PERSONAL HISTORY OF PULMONARY EMBOLISM: ICD-10-CM

## 2024-01-02 DIAGNOSIS — I10 ESSENTIAL (PRIMARY) HYPERTENSION: ICD-10-CM

## 2024-01-02 DIAGNOSIS — Z93.3 COLOSTOMY STATUS: ICD-10-CM

## 2024-01-02 DIAGNOSIS — E11.65 TYPE 2 DIABETES MELLITUS WITH HYPERGLYCEMIA: ICD-10-CM

## 2024-01-02 DIAGNOSIS — R79.89 OTHER SPECIFIED ABNORMAL FINDINGS OF BLOOD CHEMISTRY: ICD-10-CM

## 2024-01-02 DIAGNOSIS — E11.9 TYPE 2 DIABETES MELLITUS WITHOUT COMPLICATIONS: ICD-10-CM

## 2024-01-02 LAB
-  LINEZOLID: SIGNIFICANT CHANGE UP
-  LINEZOLID: SIGNIFICANT CHANGE UP
-  NITROFURANTOIN: SIGNIFICANT CHANGE UP
-  NITROFURANTOIN: SIGNIFICANT CHANGE UP
-  OXACILLIN: SIGNIFICANT CHANGE UP
-  OXACILLIN: SIGNIFICANT CHANGE UP
-  RIFAMPIN: SIGNIFICANT CHANGE UP
-  RIFAMPIN: SIGNIFICANT CHANGE UP
-  TETRACYCLINE: SIGNIFICANT CHANGE UP
-  TETRACYCLINE: SIGNIFICANT CHANGE UP
-  TRIMETHOPRIM/SULFAMETHOXAZOLE: SIGNIFICANT CHANGE UP
-  TRIMETHOPRIM/SULFAMETHOXAZOLE: SIGNIFICANT CHANGE UP
-  VANCOMYCIN: SIGNIFICANT CHANGE UP
-  VANCOMYCIN: SIGNIFICANT CHANGE UP
ALBUMIN SERPL ELPH-MCNC: 2.8 G/DL — LOW (ref 3.3–5)
ALBUMIN SERPL ELPH-MCNC: 2.8 G/DL — LOW (ref 3.3–5)
ALP SERPL-CCNC: 167 U/L — HIGH (ref 40–120)
ALP SERPL-CCNC: 167 U/L — HIGH (ref 40–120)
ALT FLD-CCNC: 22 U/L — SIGNIFICANT CHANGE UP (ref 10–45)
ALT FLD-CCNC: 22 U/L — SIGNIFICANT CHANGE UP (ref 10–45)
ANION GAP SERPL CALC-SCNC: 9 MMOL/L — SIGNIFICANT CHANGE UP (ref 5–17)
ANION GAP SERPL CALC-SCNC: 9 MMOL/L — SIGNIFICANT CHANGE UP (ref 5–17)
AST SERPL-CCNC: 31 U/L — SIGNIFICANT CHANGE UP (ref 10–40)
AST SERPL-CCNC: 31 U/L — SIGNIFICANT CHANGE UP (ref 10–40)
BASOPHILS # BLD AUTO: 0.03 K/UL — SIGNIFICANT CHANGE UP (ref 0–0.2)
BASOPHILS # BLD AUTO: 0.03 K/UL — SIGNIFICANT CHANGE UP (ref 0–0.2)
BASOPHILS NFR BLD AUTO: 0.3 % — SIGNIFICANT CHANGE UP (ref 0–2)
BASOPHILS NFR BLD AUTO: 0.3 % — SIGNIFICANT CHANGE UP (ref 0–2)
BILIRUB SERPL-MCNC: <0.2 MG/DL — SIGNIFICANT CHANGE UP (ref 0.2–1.2)
BILIRUB SERPL-MCNC: <0.2 MG/DL — SIGNIFICANT CHANGE UP (ref 0.2–1.2)
BUN SERPL-MCNC: 16 MG/DL — SIGNIFICANT CHANGE UP (ref 7–23)
BUN SERPL-MCNC: 16 MG/DL — SIGNIFICANT CHANGE UP (ref 7–23)
CALCIUM SERPL-MCNC: 8.6 MG/DL — SIGNIFICANT CHANGE UP (ref 8.4–10.5)
CALCIUM SERPL-MCNC: 8.6 MG/DL — SIGNIFICANT CHANGE UP (ref 8.4–10.5)
CHLORIDE SERPL-SCNC: 104 MMOL/L — SIGNIFICANT CHANGE UP (ref 96–108)
CHLORIDE SERPL-SCNC: 104 MMOL/L — SIGNIFICANT CHANGE UP (ref 96–108)
CO2 SERPL-SCNC: 19 MMOL/L — LOW (ref 22–31)
CO2 SERPL-SCNC: 19 MMOL/L — LOW (ref 22–31)
CREAT SERPL-MCNC: 0.82 MG/DL — SIGNIFICANT CHANGE UP (ref 0.5–1.3)
CREAT SERPL-MCNC: 0.82 MG/DL — SIGNIFICANT CHANGE UP (ref 0.5–1.3)
CULTURE RESULTS: ABNORMAL
CULTURE RESULTS: ABNORMAL
EGFR: 91 ML/MIN/1.73M2 — SIGNIFICANT CHANGE UP
EGFR: 91 ML/MIN/1.73M2 — SIGNIFICANT CHANGE UP
EOSINOPHIL # BLD AUTO: 0.08 K/UL — SIGNIFICANT CHANGE UP (ref 0–0.5)
EOSINOPHIL # BLD AUTO: 0.08 K/UL — SIGNIFICANT CHANGE UP (ref 0–0.5)
EOSINOPHIL NFR BLD AUTO: 0.8 % — SIGNIFICANT CHANGE UP (ref 0–6)
EOSINOPHIL NFR BLD AUTO: 0.8 % — SIGNIFICANT CHANGE UP (ref 0–6)
GLUCOSE BLDC GLUCOMTR-MCNC: 116 MG/DL — HIGH (ref 70–99)
GLUCOSE BLDC GLUCOMTR-MCNC: 116 MG/DL — HIGH (ref 70–99)
GLUCOSE BLDC GLUCOMTR-MCNC: 140 MG/DL — HIGH (ref 70–99)
GLUCOSE BLDC GLUCOMTR-MCNC: 140 MG/DL — HIGH (ref 70–99)
GLUCOSE BLDC GLUCOMTR-MCNC: 184 MG/DL — HIGH (ref 70–99)
GLUCOSE BLDC GLUCOMTR-MCNC: 184 MG/DL — HIGH (ref 70–99)
GLUCOSE BLDC GLUCOMTR-MCNC: 237 MG/DL — HIGH (ref 70–99)
GLUCOSE BLDC GLUCOMTR-MCNC: 237 MG/DL — HIGH (ref 70–99)
GLUCOSE BLDC GLUCOMTR-MCNC: 307 MG/DL — HIGH (ref 70–99)
GLUCOSE BLDC GLUCOMTR-MCNC: 307 MG/DL — HIGH (ref 70–99)
GLUCOSE BLDC GLUCOMTR-MCNC: 309 MG/DL — HIGH (ref 70–99)
GLUCOSE BLDC GLUCOMTR-MCNC: 309 MG/DL — HIGH (ref 70–99)
GLUCOSE BLDC GLUCOMTR-MCNC: 313 MG/DL — HIGH (ref 70–99)
GLUCOSE BLDC GLUCOMTR-MCNC: 313 MG/DL — HIGH (ref 70–99)
GLUCOSE BLDC GLUCOMTR-MCNC: 342 MG/DL — HIGH (ref 70–99)
GLUCOSE BLDC GLUCOMTR-MCNC: 342 MG/DL — HIGH (ref 70–99)
GLUCOSE SERPL-MCNC: 330 MG/DL — HIGH (ref 70–99)
GLUCOSE SERPL-MCNC: 330 MG/DL — HIGH (ref 70–99)
HAV IGM SER-ACNC: SIGNIFICANT CHANGE UP
HAV IGM SER-ACNC: SIGNIFICANT CHANGE UP
HBV CORE IGM SER-ACNC: SIGNIFICANT CHANGE UP
HBV CORE IGM SER-ACNC: SIGNIFICANT CHANGE UP
HBV SURFACE AG SER-ACNC: SIGNIFICANT CHANGE UP
HBV SURFACE AG SER-ACNC: SIGNIFICANT CHANGE UP
HCT VFR BLD CALC: 40.2 % — SIGNIFICANT CHANGE UP (ref 34.5–45)
HCT VFR BLD CALC: 40.2 % — SIGNIFICANT CHANGE UP (ref 34.5–45)
HCV AB S/CO SERPL IA: 0.03 S/CO — SIGNIFICANT CHANGE UP
HCV AB S/CO SERPL IA: 0.03 S/CO — SIGNIFICANT CHANGE UP
HCV AB SERPL-IMP: SIGNIFICANT CHANGE UP
HCV AB SERPL-IMP: SIGNIFICANT CHANGE UP
HGB BLD-MCNC: 12 G/DL — SIGNIFICANT CHANGE UP (ref 11.5–15.5)
HGB BLD-MCNC: 12 G/DL — SIGNIFICANT CHANGE UP (ref 11.5–15.5)
HIV 1+2 AB+HIV1 P24 AG SERPL QL IA: SIGNIFICANT CHANGE UP
HIV 1+2 AB+HIV1 P24 AG SERPL QL IA: SIGNIFICANT CHANGE UP
IMM GRANULOCYTES NFR BLD AUTO: 0.3 % — SIGNIFICANT CHANGE UP (ref 0–0.9)
IMM GRANULOCYTES NFR BLD AUTO: 0.3 % — SIGNIFICANT CHANGE UP (ref 0–0.9)
LYMPHOCYTES # BLD AUTO: 3.93 K/UL — HIGH (ref 1–3.3)
LYMPHOCYTES # BLD AUTO: 3.93 K/UL — HIGH (ref 1–3.3)
LYMPHOCYTES # BLD AUTO: 38.2 % — SIGNIFICANT CHANGE UP (ref 13–44)
LYMPHOCYTES # BLD AUTO: 38.2 % — SIGNIFICANT CHANGE UP (ref 13–44)
MAGNESIUM SERPL-MCNC: 1.9 MG/DL — SIGNIFICANT CHANGE UP (ref 1.6–2.6)
MAGNESIUM SERPL-MCNC: 1.9 MG/DL — SIGNIFICANT CHANGE UP (ref 1.6–2.6)
MCHC RBC-ENTMCNC: 23.6 PG — LOW (ref 27–34)
MCHC RBC-ENTMCNC: 23.6 PG — LOW (ref 27–34)
MCHC RBC-ENTMCNC: 29.9 GM/DL — LOW (ref 32–36)
MCHC RBC-ENTMCNC: 29.9 GM/DL — LOW (ref 32–36)
MCV RBC AUTO: 79 FL — LOW (ref 80–100)
MCV RBC AUTO: 79 FL — LOW (ref 80–100)
METHOD TYPE: SIGNIFICANT CHANGE UP
METHOD TYPE: SIGNIFICANT CHANGE UP
MONOCYTES # BLD AUTO: 0.62 K/UL — SIGNIFICANT CHANGE UP (ref 0–0.9)
MONOCYTES # BLD AUTO: 0.62 K/UL — SIGNIFICANT CHANGE UP (ref 0–0.9)
MONOCYTES NFR BLD AUTO: 6 % — SIGNIFICANT CHANGE UP (ref 2–14)
MONOCYTES NFR BLD AUTO: 6 % — SIGNIFICANT CHANGE UP (ref 2–14)
NEUTROPHILS # BLD AUTO: 5.59 K/UL — SIGNIFICANT CHANGE UP (ref 1.8–7.4)
NEUTROPHILS # BLD AUTO: 5.59 K/UL — SIGNIFICANT CHANGE UP (ref 1.8–7.4)
NEUTROPHILS NFR BLD AUTO: 54.4 % — SIGNIFICANT CHANGE UP (ref 43–77)
NEUTROPHILS NFR BLD AUTO: 54.4 % — SIGNIFICANT CHANGE UP (ref 43–77)
NRBC # BLD: 0 /100 WBCS — SIGNIFICANT CHANGE UP (ref 0–0)
NRBC # BLD: 0 /100 WBCS — SIGNIFICANT CHANGE UP (ref 0–0)
ORGANISM # SPEC MICROSCOPIC CNT: ABNORMAL
ORGANISM # SPEC MICROSCOPIC CNT: ABNORMAL
ORGANISM # SPEC MICROSCOPIC CNT: SIGNIFICANT CHANGE UP
ORGANISM # SPEC MICROSCOPIC CNT: SIGNIFICANT CHANGE UP
PHOSPHATE SERPL-MCNC: 2.3 MG/DL — LOW (ref 2.5–4.5)
PHOSPHATE SERPL-MCNC: 2.3 MG/DL — LOW (ref 2.5–4.5)
PLATELET # BLD AUTO: 382 K/UL — SIGNIFICANT CHANGE UP (ref 150–400)
PLATELET # BLD AUTO: 382 K/UL — SIGNIFICANT CHANGE UP (ref 150–400)
POTASSIUM SERPL-MCNC: 4.5 MMOL/L — SIGNIFICANT CHANGE UP (ref 3.5–5.3)
POTASSIUM SERPL-MCNC: 4.5 MMOL/L — SIGNIFICANT CHANGE UP (ref 3.5–5.3)
POTASSIUM SERPL-SCNC: 4.5 MMOL/L — SIGNIFICANT CHANGE UP (ref 3.5–5.3)
POTASSIUM SERPL-SCNC: 4.5 MMOL/L — SIGNIFICANT CHANGE UP (ref 3.5–5.3)
PROT SERPL-MCNC: 6.7 G/DL — SIGNIFICANT CHANGE UP (ref 6–8.3)
PROT SERPL-MCNC: 6.7 G/DL — SIGNIFICANT CHANGE UP (ref 6–8.3)
RBC # BLD: 5.09 M/UL — SIGNIFICANT CHANGE UP (ref 3.8–5.2)
RBC # BLD: 5.09 M/UL — SIGNIFICANT CHANGE UP (ref 3.8–5.2)
RBC # FLD: 17.1 % — HIGH (ref 10.3–14.5)
RBC # FLD: 17.1 % — HIGH (ref 10.3–14.5)
SODIUM SERPL-SCNC: 132 MMOL/L — LOW (ref 135–145)
SODIUM SERPL-SCNC: 132 MMOL/L — LOW (ref 135–145)
SPECIMEN SOURCE: SIGNIFICANT CHANGE UP
SPECIMEN SOURCE: SIGNIFICANT CHANGE UP
WBC # BLD: 10.28 K/UL — SIGNIFICANT CHANGE UP (ref 3.8–10.5)
WBC # BLD: 10.28 K/UL — SIGNIFICANT CHANGE UP (ref 3.8–10.5)
WBC # FLD AUTO: 10.28 K/UL — SIGNIFICANT CHANGE UP (ref 3.8–10.5)
WBC # FLD AUTO: 10.28 K/UL — SIGNIFICANT CHANGE UP (ref 3.8–10.5)

## 2024-01-02 PROCEDURE — 76937 US GUIDE VASCULAR ACCESS: CPT | Mod: 26,59

## 2024-01-02 PROCEDURE — 99232 SBSQ HOSP IP/OBS MODERATE 35: CPT

## 2024-01-02 PROCEDURE — 36415 COLL VENOUS BLD VENIPUNCTURE: CPT

## 2024-01-02 PROCEDURE — 99233 SBSQ HOSP IP/OBS HIGH 50: CPT | Mod: GC

## 2024-01-02 RX ORDER — HUMAN INSULIN 100 [IU]/ML
72 INJECTION, SUSPENSION SUBCUTANEOUS
Refills: 0 | Status: DISCONTINUED | OUTPATIENT
Start: 2024-01-02 | End: 2024-01-05

## 2024-01-02 RX ORDER — INSULIN LISPRO 100/ML
6 VIAL (ML) SUBCUTANEOUS ONCE
Refills: 0 | Status: COMPLETED | OUTPATIENT
Start: 2024-01-02 | End: 2024-01-02

## 2024-01-02 RX ORDER — SEMAGLUTIDE 0.68 MG/ML
0.25 INJECTION, SOLUTION SUBCUTANEOUS
Qty: 4 | Refills: 5
Start: 2024-01-02

## 2024-01-02 RX ORDER — INSULIN LISPRO 100/ML
40 VIAL (ML) SUBCUTANEOUS
Refills: 0 | Status: DISCONTINUED | OUTPATIENT
Start: 2024-01-02 | End: 2024-01-05

## 2024-01-02 RX ORDER — TIRZEPATIDE 15 MG/.5ML
2.5 INJECTION, SOLUTION SUBCUTANEOUS
Qty: 4 | Refills: 1
Start: 2024-01-02

## 2024-01-02 RX ORDER — ONDANSETRON 8 MG/1
4 TABLET, FILM COATED ORAL ONCE
Refills: 0 | Status: COMPLETED | OUTPATIENT
Start: 2024-01-02 | End: 2024-01-02

## 2024-01-02 RX ORDER — FLUCONAZOLE 150 MG/1
150 TABLET ORAL
Refills: 0 | Status: DISCONTINUED | OUTPATIENT
Start: 2024-01-02 | End: 2024-01-05

## 2024-01-02 RX ADMIN — OXYCODONE HYDROCHLORIDE 10 MILLIGRAM(S): 5 TABLET ORAL at 09:13

## 2024-01-02 RX ADMIN — ONDANSETRON 4 MILLIGRAM(S): 8 TABLET, FILM COATED ORAL at 07:49

## 2024-01-02 RX ADMIN — Medication 35 UNIT(S): at 11:42

## 2024-01-02 RX ADMIN — OXYCODONE HYDROCHLORIDE 10 MILLIGRAM(S): 5 TABLET ORAL at 20:02

## 2024-01-02 RX ADMIN — HUMAN INSULIN 65 UNIT(S): 100 INJECTION, SUSPENSION SUBCUTANEOUS at 00:06

## 2024-01-02 RX ADMIN — OXYCODONE HYDROCHLORIDE 10 MILLIGRAM(S): 5 TABLET ORAL at 16:40

## 2024-01-02 RX ADMIN — ATORVASTATIN CALCIUM 80 MILLIGRAM(S): 80 TABLET, FILM COATED ORAL at 22:45

## 2024-01-02 RX ADMIN — CHLORHEXIDINE GLUCONATE 1 APPLICATION(S): 213 SOLUTION TOPICAL at 05:26

## 2024-01-02 RX ADMIN — Medication 6 UNIT(S): at 02:46

## 2024-01-02 RX ADMIN — HUMAN INSULIN 65 UNIT(S): 100 INJECTION, SUSPENSION SUBCUTANEOUS at 07:29

## 2024-01-02 RX ADMIN — OXYCODONE HYDROCHLORIDE 10 MILLIGRAM(S): 5 TABLET ORAL at 09:57

## 2024-01-02 RX ADMIN — OXYCODONE HYDROCHLORIDE 10 MILLIGRAM(S): 5 TABLET ORAL at 04:51

## 2024-01-02 RX ADMIN — OXYCODONE HYDROCHLORIDE 10 MILLIGRAM(S): 5 TABLET ORAL at 05:59

## 2024-01-02 RX ADMIN — CLOPIDOGREL BISULFATE 75 MILLIGRAM(S): 75 TABLET, FILM COATED ORAL at 11:42

## 2024-01-02 RX ADMIN — LINEZOLID 300 MILLIGRAM(S): 600 INJECTION, SOLUTION INTRAVENOUS at 05:19

## 2024-01-02 RX ADMIN — Medication 12: at 07:31

## 2024-01-02 RX ADMIN — Medication 35 UNIT(S): at 07:30

## 2024-01-02 RX ADMIN — OXYCODONE HYDROCHLORIDE 10 MILLIGRAM(S): 5 TABLET ORAL at 19:32

## 2024-01-02 RX ADMIN — OXYCODONE HYDROCHLORIDE 10 MILLIGRAM(S): 5 TABLET ORAL at 16:09

## 2024-01-02 RX ADMIN — Medication 1 APPLICATION(S): at 05:25

## 2024-01-02 RX ADMIN — APIXABAN 5 MILLIGRAM(S): 2.5 TABLET, FILM COATED ORAL at 16:09

## 2024-01-02 RX ADMIN — APIXABAN 5 MILLIGRAM(S): 2.5 TABLET, FILM COATED ORAL at 05:19

## 2024-01-02 RX ADMIN — Medication 6: at 11:43

## 2024-01-02 RX ADMIN — ERTAPENEM SODIUM 120 MILLIGRAM(S): 1 INJECTION, POWDER, LYOPHILIZED, FOR SOLUTION INTRAMUSCULAR; INTRAVENOUS at 16:10

## 2024-01-02 RX ADMIN — LINEZOLID 300 MILLIGRAM(S): 600 INJECTION, SOLUTION INTRAVENOUS at 17:48

## 2024-01-02 RX ADMIN — Medication 62.5 MILLIMOLE(S): at 09:13

## 2024-01-02 RX ADMIN — Medication 1 APPLICATION(S): at 17:49

## 2024-01-02 RX ADMIN — Medication 35 UNIT(S): at 16:30

## 2024-01-02 RX ADMIN — FLUCONAZOLE 150 MILLIGRAM(S): 150 TABLET ORAL at 16:09

## 2024-01-02 RX ADMIN — PANTOPRAZOLE SODIUM 40 MILLIGRAM(S): 20 TABLET, DELAYED RELEASE ORAL at 09:13

## 2024-01-02 RX ADMIN — Medication 50 MILLIGRAM(S): at 05:19

## 2024-01-02 RX ADMIN — HUMAN INSULIN 65 UNIT(S): 100 INJECTION, SUSPENSION SUBCUTANEOUS at 16:30

## 2024-01-02 NOTE — PROGRESS NOTE ADULT - SUBJECTIVE AND OBJECTIVE BOX
OVERNIGHT EVENTS: decreased poct to q2, insulin 6u x1 sliding scale for 300 sugars    SUBJECTIVE/INTERVAL HPI: Patient was seen and examined at bedside, resting comfortably.     VITAL SIGNS:  T(F): 96.8 (01-02-24 @ 01:01)  HR: 92 (01-02-24 @ 05:00)  BP: 155/91 (01-02-24 @ 05:00)  RR: 27 (01-02-24 @ 05:00)  SpO2: 98% (01-02-24 @ 05:00)  Wt(kg): --      12-31-23 @ 07:01  -  01-01-24 @ 07:00  --------------------------------------------------------  IN: 1538 mL / OUT: 2600 mL / NET: -1062 mL    01-01-24 @ 07:01  -  01-02-24 @ 05:42  --------------------------------------------------------  IN: 1349.8 mL / OUT: 1050 mL / NET: 299.8 mL        PHYSICAL EXAM:  General: NAD, comfortable   Head: PERRL, dry MM  Neck: Supple  Respiratory: CTAB; no wheezes  Cardiovascular: RRR, S1/S2, no murmurs  Gastrointestinal: Soft, nondistended, +parastomal hernia, +ostomy bag, mild RLQ tenderness, b/l groin w/ desquamation of skin with erythema to L inguinal region, no crepitus, fluctuance or drainage noted  Extremities: WWP; no LE edema  Neurological: A&Ox3, no obvious focal deficits      MEDICATIONS  (STANDING):  apixaban 5 milliGRAM(s) Oral every 12 hours  atorvastatin 80 milliGRAM(s) Oral at bedtime  chlorhexidine 2% Cloths 1 Application(s) Topical <User Schedule>  clopidogrel Tablet 75 milliGRAM(s) Oral daily  clotrimazole 1% Cream 1 Application(s) Topical two times a day  dextrose 5%. 1000 milliLiter(s) (100 mL/Hr) IV Continuous <Continuous>  dextrose 5%. 1000 milliLiter(s) (50 mL/Hr) IV Continuous <Continuous>  dextrose 50% Injectable 25 Gram(s) IV Push once  dextrose 50% Injectable 12.5 Gram(s) IV Push once  dextrose 50% Injectable 25 Gram(s) IV Push once  ertapenem  IVPB 1000 milliGRAM(s) IV Intermittent every 24 hours  glucagon  Injectable 1 milliGRAM(s) IntraMuscular once  insulin lispro (ADMELOG) corrective regimen sliding scale   SubCutaneous three times a day before meals  insulin lispro Injectable (ADMELOG) 35 Unit(s) SubCutaneous three times a day before meals  insulin NPH human recombinant 65 Unit(s) SubCutaneous <User Schedule>  linezolid  IVPB      linezolid  IVPB 600 milliGRAM(s) IV Intermittent every 12 hours  metoprolol succinate ER 50 milliGRAM(s) Oral daily  pantoprazole    Tablet 40 milliGRAM(s) Oral every 24 hours    MEDICATIONS  (PRN):  dextrose Oral Gel 15 Gram(s) Oral once PRN Blood Glucose LESS THAN 70 milliGRAM(s)/deciliter  oxyCODONE    IR 10 milliGRAM(s) Oral every 4 hours PRN Severe Pain (7 - 10)      Allergies    iodine containing compounds (Hives; Anaphylaxis)  clindamycin (Pruritus)  fish (Hives; Urticaria)  vancomycin (Anaphylaxis; Hives)  penicillin (Hives)  shellfish. (Hives; Anaphylaxis)  amoxicillin (Short breath; Rash)    Intolerances    Bactrim I.V. (Rash (Mod to Severe))      LABS:                        12.8   12.87 )-----------( 318      ( 01 Jan 2024 04:36 )             41.7     01-01    132<L>  |  104  |  11  ----------------------------<  247<H>  4.3   |  17<L>  |  0.82    Ca    8.4      01 Jan 2024 15:52  Phos  2.5     01-01  Mg     1.8     01-01    TPro  7.0  /  Alb  3.0<L>  /  TBili  <0.2  /  DBili  x   /  AST  48<H>  /  ALT  23  /  AlkPhos  187<H>  01-01    PT/INR - ( 31 Dec 2023 12:46 )   PT: 11.5 sec;   INR: 1.01          PTT - ( 31 Dec 2023 12:46 )  PTT:28.8 sec  Urinalysis Basic - ( 01 Jan 2024 15:52 )    Color: x / Appearance: x / SG: x / pH: x  Gluc: 247 mg/dL / Ketone: x  / Bili: x / Urobili: x   Blood: x / Protein: x / Nitrite: x   Leuk Esterase: x / RBC: x / WBC x   Sq Epi: x / Non Sq Epi: x / Bacteria: x        RADIOLOGY & ADDITIONAL TESTS:  Reviewed ***Transfer note from MICU to [facility]***    HOSPITAL COURSE:  43-year-old female with PMH Asthma, CVA (11/2021; residual L>R weakness), PE (4/2023 on Eliquis), uncontrolled DM-2 (on insulin), HTN, HLD, hx abdominal necrotizing fasciitis (ostomy placement in 11/2021 with intubation from 11-12/2021), multiple abd hernia repairs, frequent UTIs, CAD s/p PCI presenting with worsening groin pain and redness, found to have glucose >600 and AG in ED (now closed). Admitted to MICU for hyperglycemia requiring insulin gtt. On 1/1, pt weaned off insulin gtt, endo consulted, started NPH 65U TID and Lispro 35U TID. ID consulted for inguinal erythema, c/w intertrigo, started Clotrimazole cream BID, ertapenem and linezolid. On 1/2, pt's glucose is better controlled and is medically optimized for step down to [facility].     OVERNIGHT EVENTS: FAUSTINO    SUBJECTIVE/INTERVAL HPI: Patient was seen and examined at bedside, resting comfortably. Patient reports feeling nauseous. Denies any chest pain, SOB, abdominal pain, vomiting or diarrhea.    VITAL SIGNS:  T(F): 96.8 (01-02-24 @ 01:01)  HR: 92 (01-02-24 @ 05:00)  BP: 155/91 (01-02-24 @ 05:00)  RR: 27 (01-02-24 @ 05:00)  SpO2: 98% (01-02-24 @ 05:00)  Wt(kg): --      12-31-23 @ 07:01  -  01-01-24 @ 07:00  --------------------------------------------------------  IN: 1538 mL / OUT: 2600 mL / NET: -1062 mL    01-01-24 @ 07:01  -  01-02-24 @ 05:42  --------------------------------------------------------  IN: 1349.8 mL / OUT: 1050 mL / NET: 299.8 mL        PHYSICAL EXAM:  General: NAD, comfortable   Head: PERRL, MMM  Neck: Supple  Respiratory: CTAB; no wheezes  Cardiovascular: RRR, S1/S2, no murmurs  Gastrointestinal: Soft, nondistended, +parastomal hernia, +ostomy bag, intertrigo noted to b/l inguinal regions  Extremities: WWP; no LE edema  Neurological: A&Ox3, no obvious focal deficits      MEDICATIONS  (STANDING):  apixaban 5 milliGRAM(s) Oral every 12 hours  atorvastatin 80 milliGRAM(s) Oral at bedtime  chlorhexidine 2% Cloths 1 Application(s) Topical <User Schedule>  clopidogrel Tablet 75 milliGRAM(s) Oral daily  clotrimazole 1% Cream 1 Application(s) Topical two times a day  dextrose 5%. 1000 milliLiter(s) (100 mL/Hr) IV Continuous <Continuous>  dextrose 5%. 1000 milliLiter(s) (50 mL/Hr) IV Continuous <Continuous>  dextrose 50% Injectable 25 Gram(s) IV Push once  dextrose 50% Injectable 12.5 Gram(s) IV Push once  dextrose 50% Injectable 25 Gram(s) IV Push once  ertapenem  IVPB 1000 milliGRAM(s) IV Intermittent every 24 hours  glucagon  Injectable 1 milliGRAM(s) IntraMuscular once  insulin lispro (ADMELOG) corrective regimen sliding scale   SubCutaneous three times a day before meals  insulin lispro Injectable (ADMELOG) 35 Unit(s) SubCutaneous three times a day before meals  insulin NPH human recombinant 65 Unit(s) SubCutaneous <User Schedule>  linezolid  IVPB      linezolid  IVPB 600 milliGRAM(s) IV Intermittent every 12 hours  metoprolol succinate ER 50 milliGRAM(s) Oral daily  pantoprazole    Tablet 40 milliGRAM(s) Oral every 24 hours    MEDICATIONS  (PRN):  dextrose Oral Gel 15 Gram(s) Oral once PRN Blood Glucose LESS THAN 70 milliGRAM(s)/deciliter  oxyCODONE    IR 10 milliGRAM(s) Oral every 4 hours PRN Severe Pain (7 - 10)      Allergies    iodine containing compounds (Hives; Anaphylaxis)  clindamycin (Pruritus)  fish (Hives; Urticaria)  vancomycin (Anaphylaxis; Hives)  penicillin (Hives)  shellfish. (Hives; Anaphylaxis)  amoxicillin (Short breath; Rash)    Intolerances    Bactrim I.V. (Rash (Mod to Severe))      LABS:                        12.8   12.87 )-----------( 318      ( 01 Jan 2024 04:36 )             41.7     01-01    132<L>  |  104  |  11  ----------------------------<  247<H>  4.3   |  17<L>  |  0.82    Ca    8.4      01 Jan 2024 15:52  Phos  2.5     01-01  Mg     1.8     01-01    TPro  7.0  /  Alb  3.0<L>  /  TBili  <0.2  /  DBili  x   /  AST  48<H>  /  ALT  23  /  AlkPhos  187<H>  01-01    PT/INR - ( 31 Dec 2023 12:46 )   PT: 11.5 sec;   INR: 1.01          PTT - ( 31 Dec 2023 12:46 )  PTT:28.8 sec  Urinalysis Basic - ( 01 Jan 2024 15:52 )    Color: x / Appearance: x / SG: x / pH: x  Gluc: 247 mg/dL / Ketone: x  / Bili: x / Urobili: x   Blood: x / Protein: x / Nitrite: x   Leuk Esterase: x / RBC: x / WBC x   Sq Epi: x / Non Sq Epi: x / Bacteria: x        RADIOLOGY & ADDITIONAL TESTS:  Reviewed ***Transfer note from MICU to [facility]***    HOSPITAL COURSE:  43-year-old female with PMH Asthma, CVA (11/2021; residual L>R weakness), PE (4/2023 on Eliquis), uncontrolled DM-2 (on insulin), HTN, HLD, hx abdominal necrotizing fasciitis (ostomy placement in 11/2021 with intubation from 11-12/2021), multiple abd hernia repairs, frequent UTIs, CAD s/p PCI presenting with worsening groin pain and redness, found to have glucose >600 and AG in ED (now closed). Admitted to MICU for hyperglycemia requiring insulin gtt. On 1/1, pt weaned off insulin gtt, endo consulted, started NPH 65U TID and Lispro 35U TID. ID consulted for inguinal erythema, c/w intertrigo, started Clotrimazole cream BID, ertapenem and linezolid. On 1/2, pt's glucose is better controlled and is medically optimized for step down to [facility].     OVERNIGHT EVENTS: FAUSTINO    SUBJECTIVE/INTERVAL HPI: Patient was seen and examined at bedside, resting comfortably. Patient reports feeling nauseous. Denies any chest pain, SOB, abdominal pain, vomiting or diarrhea.    VITAL SIGNS:  T(F): 96.8 (01-02-24 @ 01:01)  HR: 92 (01-02-24 @ 05:00)  BP: 155/91 (01-02-24 @ 05:00)  RR: 27 (01-02-24 @ 05:00)  SpO2: 98% (01-02-24 @ 05:00)  Wt(kg): --      12-31-23 @ 07:01  -  01-01-24 @ 07:00  --------------------------------------------------------  IN: 1538 mL / OUT: 2600 mL / NET: -1062 mL    01-01-24 @ 07:01  -  01-02-24 @ 05:42  --------------------------------------------------------  IN: 1349.8 mL / OUT: 1050 mL / NET: 299.8 mL        PHYSICAL EXAM:  General: NAD, comfortable   Head: PERRL, MMM  Neck: Supple  Respiratory: CTAB; no wheezes  Cardiovascular: RRR, S1/S2, no murmurs  Gastrointestinal: Soft, nondistended, +parastomal hernia, +ostomy bag, intertrigo noted to b/l inguinal regions  Extremities: WWP; no LE edema  Neurological: A&Ox3, no obvious focal deficits      MEDICATIONS  (STANDING):  apixaban 5 milliGRAM(s) Oral every 12 hours  atorvastatin 80 milliGRAM(s) Oral at bedtime  chlorhexidine 2% Cloths 1 Application(s) Topical <User Schedule>  clopidogrel Tablet 75 milliGRAM(s) Oral daily  clotrimazole 1% Cream 1 Application(s) Topical two times a day  dextrose 5%. 1000 milliLiter(s) (100 mL/Hr) IV Continuous <Continuous>  dextrose 5%. 1000 milliLiter(s) (50 mL/Hr) IV Continuous <Continuous>  dextrose 50% Injectable 25 Gram(s) IV Push once  dextrose 50% Injectable 12.5 Gram(s) IV Push once  dextrose 50% Injectable 25 Gram(s) IV Push once  ertapenem  IVPB 1000 milliGRAM(s) IV Intermittent every 24 hours  glucagon  Injectable 1 milliGRAM(s) IntraMuscular once  insulin lispro (ADMELOG) corrective regimen sliding scale   SubCutaneous three times a day before meals  insulin lispro Injectable (ADMELOG) 35 Unit(s) SubCutaneous three times a day before meals  insulin NPH human recombinant 65 Unit(s) SubCutaneous <User Schedule>  linezolid  IVPB      linezolid  IVPB 600 milliGRAM(s) IV Intermittent every 12 hours  metoprolol succinate ER 50 milliGRAM(s) Oral daily  pantoprazole    Tablet 40 milliGRAM(s) Oral every 24 hours    MEDICATIONS  (PRN):  dextrose Oral Gel 15 Gram(s) Oral once PRN Blood Glucose LESS THAN 70 milliGRAM(s)/deciliter  oxyCODONE    IR 10 milliGRAM(s) Oral every 4 hours PRN Severe Pain (7 - 10)      Allergies    iodine containing compounds (Hives; Anaphylaxis)  clindamycin (Pruritus)  fish (Hives; Urticaria)  vancomycin (Anaphylaxis; Hives)  penicillin (Hives)  shellfish. (Hives; Anaphylaxis)  amoxicillin (Short breath; Rash)    Intolerances    Bactrim I.V. (Rash (Mod to Severe))      LABS:                        12.8   12.87 )-----------( 318      ( 01 Jan 2024 04:36 )             41.7     01-01    132<L>  |  104  |  11  ----------------------------<  247<H>  4.3   |  17<L>  |  0.82    Ca    8.4      01 Jan 2024 15:52  Phos  2.5     01-01  Mg     1.8     01-01    TPro  7.0  /  Alb  3.0<L>  /  TBili  <0.2  /  DBili  x   /  AST  48<H>  /  ALT  23  /  AlkPhos  187<H>  01-01    PT/INR - ( 31 Dec 2023 12:46 )   PT: 11.5 sec;   INR: 1.01          PTT - ( 31 Dec 2023 12:46 )  PTT:28.8 sec  Urinalysis Basic - ( 01 Jan 2024 15:52 )    Color: x / Appearance: x / SG: x / pH: x  Gluc: 247 mg/dL / Ketone: x  / Bili: x / Urobili: x   Blood: x / Protein: x / Nitrite: x   Leuk Esterase: x / RBC: x / WBC x   Sq Epi: x / Non Sq Epi: x / Bacteria: x        RADIOLOGY & ADDITIONAL TESTS:  Reviewed ***Transfer note from MICU to [facility]***    HOSPITAL COURSE:  43-year-old female with PMH Asthma, CVA (11/2021; residual L>R weakness), PE (4/2023 on Eliquis), uncontrolled DM-2 (on insulin), HTN, HLD, hx abdominal necrotizing fasciitis (ostomy placement in 11/2021 with intubation from 11-12/2021), multiple abd hernia repairs, frequent UTIs, CAD s/p PCI presenting with worsening groin pain and redness, found to have glucose >600 and AG in ED (now closed). Admitted to MICU for hyperglycemia requiring insulin gtt. On 1/1, pt weaned off insulin gtt, endo consulted, started NPH 65U TID and Lispro 35U TID. ID consulted for inguinal erythema, c/w intertrigo, started Clotrimazole cream BID, ertapenem and linezolid. On 1/2, pt's glucose is better controlled and is medically optimized for step down to [facility].     OVERNIGHT EVENTS: FAUSTINO    SUBJECTIVE/INTERVAL HPI: Patient was seen and examined at bedside, resting comfortably. Patient reports feeling nauseous. Denies any chest pain, SOB, abdominal pain, vomiting or diarrhea.    VITAL SIGNS:  T(F): 96.8 (01-02-24 @ 01:01)  HR: 92 (01-02-24 @ 05:00)  BP: 155/91 (01-02-24 @ 05:00)  RR: 27 (01-02-24 @ 05:00)  SpO2: 98% (01-02-24 @ 05:00)  Wt(kg): --      12-31-23 @ 07:01  -  01-01-24 @ 07:00  --------------------------------------------------------  IN: 1538 mL / OUT: 2600 mL / NET: -1062 mL    01-01-24 @ 07:01  -  01-02-24 @ 05:42  --------------------------------------------------------  IN: 1349.8 mL / OUT: 1050 mL / NET: 299.8 mL        PHYSICAL EXAM:  General: NAD, comfortable   Head: PERRL, MMM  Neck: Supple  Respiratory: CTAB; no wheezes  Cardiovascular: RRR, S1/S2, no murmurs  Gastrointestinal: Soft, nondistended, +parastomal hernia, +ostomy bag, intertrigo noted to b/l inguinal regions  : + Primafit  Extremities: WWP; no LE edema  Neurological: A&Ox3, no obvious focal deficits      MEDICATIONS  (STANDING):  apixaban 5 milliGRAM(s) Oral every 12 hours  atorvastatin 80 milliGRAM(s) Oral at bedtime  chlorhexidine 2% Cloths 1 Application(s) Topical <User Schedule>  clopidogrel Tablet 75 milliGRAM(s) Oral daily  clotrimazole 1% Cream 1 Application(s) Topical two times a day  dextrose 5%. 1000 milliLiter(s) (100 mL/Hr) IV Continuous <Continuous>  dextrose 5%. 1000 milliLiter(s) (50 mL/Hr) IV Continuous <Continuous>  dextrose 50% Injectable 25 Gram(s) IV Push once  dextrose 50% Injectable 12.5 Gram(s) IV Push once  dextrose 50% Injectable 25 Gram(s) IV Push once  ertapenem  IVPB 1000 milliGRAM(s) IV Intermittent every 24 hours  glucagon  Injectable 1 milliGRAM(s) IntraMuscular once  insulin lispro (ADMELOG) corrective regimen sliding scale   SubCutaneous three times a day before meals  insulin lispro Injectable (ADMELOG) 35 Unit(s) SubCutaneous three times a day before meals  insulin NPH human recombinant 65 Unit(s) SubCutaneous <User Schedule>  linezolid  IVPB      linezolid  IVPB 600 milliGRAM(s) IV Intermittent every 12 hours  metoprolol succinate ER 50 milliGRAM(s) Oral daily  pantoprazole    Tablet 40 milliGRAM(s) Oral every 24 hours    MEDICATIONS  (PRN):  dextrose Oral Gel 15 Gram(s) Oral once PRN Blood Glucose LESS THAN 70 milliGRAM(s)/deciliter  oxyCODONE    IR 10 milliGRAM(s) Oral every 4 hours PRN Severe Pain (7 - 10)      Allergies    iodine containing compounds (Hives; Anaphylaxis)  clindamycin (Pruritus)  fish (Hives; Urticaria)  vancomycin (Anaphylaxis; Hives)  penicillin (Hives)  shellfish. (Hives; Anaphylaxis)  amoxicillin (Short breath; Rash)    Intolerances    Bactrim I.V. (Rash (Mod to Severe))      LABS:                        12.8   12.87 )-----------( 318      ( 01 Jan 2024 04:36 )             41.7     01-01    132<L>  |  104  |  11  ----------------------------<  247<H>  4.3   |  17<L>  |  0.82    Ca    8.4      01 Jan 2024 15:52  Phos  2.5     01-01  Mg     1.8     01-01    TPro  7.0  /  Alb  3.0<L>  /  TBili  <0.2  /  DBili  x   /  AST  48<H>  /  ALT  23  /  AlkPhos  187<H>  01-01    PT/INR - ( 31 Dec 2023 12:46 )   PT: 11.5 sec;   INR: 1.01          PTT - ( 31 Dec 2023 12:46 )  PTT:28.8 sec  Urinalysis Basic - ( 01 Jan 2024 15:52 )    Color: x / Appearance: x / SG: x / pH: x  Gluc: 247 mg/dL / Ketone: x  / Bili: x / Urobili: x   Blood: x / Protein: x / Nitrite: x   Leuk Esterase: x / RBC: x / WBC x   Sq Epi: x / Non Sq Epi: x / Bacteria: x        RADIOLOGY & ADDITIONAL TESTS:  Reviewed ***Transfer note from MICU to [facility]***    HOSPITAL COURSE:  43-year-old female with PMH Asthma, CVA (11/2021; residual L>R weakness), PE (4/2023 on Eliquis), uncontrolled DM-2 (on insulin), HTN, HLD, hx abdominal necrotizing fasciitis (ostomy placement in 11/2021 with intubation from 11-12/2021), multiple abd hernia repairs, frequent UTIs, CAD s/p PCI presenting with worsening groin pain and redness, found to have glucose >600 and AG in ED (now closed). Admitted to MICU for hyperglycemia requiring insulin gtt. On 1/1, pt weaned off insulin gtt, endo consulted, started NPH 65U TID and Lispro 35U TID. ID consulted for inguinal erythema, c/w intertrigo, started Clotrimazole cream BID, ertapenem and linezolid. On 1/2, pt's glucose is better controlled and is medically optimized for step down to [facility].     OVERNIGHT EVENTS: FAUSTINO    SUBJECTIVE/INTERVAL HPI: Patient was seen and examined at bedside, resting comfortably. Patient reports feeling nauseous. Denies any chest pain, SOB, abdominal pain, vomiting or diarrhea.    VITAL SIGNS:  T(F): 96.8 (01-02-24 @ 01:01)  HR: 92 (01-02-24 @ 05:00)  BP: 155/91 (01-02-24 @ 05:00)  RR: 27 (01-02-24 @ 05:00)  SpO2: 98% (01-02-24 @ 05:00)  Wt(kg): --      12-31-23 @ 07:01  -  01-01-24 @ 07:00  --------------------------------------------------------  IN: 1538 mL / OUT: 2600 mL / NET: -1062 mL    01-01-24 @ 07:01  -  01-02-24 @ 05:42  --------------------------------------------------------  IN: 1349.8 mL / OUT: 1050 mL / NET: 299.8 mL        PHYSICAL EXAM:  General: NAD, comfortable   Head: PERRL, MMM  Neck: Supple  Respiratory: CTAB; no wheezes  Cardiovascular: RRR, S1/S2, no murmurs  Gastrointestinal: Soft, nondistended, +parastomal hernia, +ostomy bag, intertrigo noted to b/l inguinal regions  : + Primafit  Extremities: WWP; no LE edema  Neurological: A&Ox3, no obvious focal deficits      MEDICATIONS  (STANDING):  apixaban 5 milliGRAM(s) Oral every 12 hours  atorvastatin 80 milliGRAM(s) Oral at bedtime  chlorhexidine 2% Cloths 1 Application(s) Topical <User Schedule>  clopidogrel Tablet 75 milliGRAM(s) Oral daily  clotrimazole 1% Cream 1 Application(s) Topical two times a day  dextrose 5%. 1000 milliLiter(s) (100 mL/Hr) IV Continuous <Continuous>  dextrose 5%. 1000 milliLiter(s) (50 mL/Hr) IV Continuous <Continuous>  dextrose 50% Injectable 25 Gram(s) IV Push once  dextrose 50% Injectable 12.5 Gram(s) IV Push once  dextrose 50% Injectable 25 Gram(s) IV Push once  ertapenem  IVPB 1000 milliGRAM(s) IV Intermittent every 24 hours  glucagon  Injectable 1 milliGRAM(s) IntraMuscular once  insulin lispro (ADMELOG) corrective regimen sliding scale   SubCutaneous three times a day before meals  insulin lispro Injectable (ADMELOG) 35 Unit(s) SubCutaneous three times a day before meals  insulin NPH human recombinant 65 Unit(s) SubCutaneous <User Schedule>  linezolid  IVPB      linezolid  IVPB 600 milliGRAM(s) IV Intermittent every 12 hours  metoprolol succinate ER 50 milliGRAM(s) Oral daily  pantoprazole    Tablet 40 milliGRAM(s) Oral every 24 hours    MEDICATIONS  (PRN):  dextrose Oral Gel 15 Gram(s) Oral once PRN Blood Glucose LESS THAN 70 milliGRAM(s)/deciliter  oxyCODONE    IR 10 milliGRAM(s) Oral every 4 hours PRN Severe Pain (7 - 10)      Allergies    iodine containing compounds (Hives; Anaphylaxis)  clindamycin (Pruritus)  fish (Hives; Urticaria)  vancomycin (Anaphylaxis; Hives)  penicillin (Hives)  shellfish. (Hives; Anaphylaxis)  amoxicillin (Short breath; Rash)    Intolerances    Bactrim I.V. (Rash (Mod to Severe))      LABS:                        12.8   12.87 )-----------( 318      ( 01 Jan 2024 04:36 )             41.7     01-01    132<L>  |  104  |  11  ----------------------------<  247<H>  4.3   |  17<L>  |  0.82    Ca    8.4      01 Jan 2024 15:52  Phos  2.5     01-01  Mg     1.8     01-01    TPro  7.0  /  Alb  3.0<L>  /  TBili  <0.2  /  DBili  x   /  AST  48<H>  /  ALT  23  /  AlkPhos  187<H>  01-01    PT/INR - ( 31 Dec 2023 12:46 )   PT: 11.5 sec;   INR: 1.01          PTT - ( 31 Dec 2023 12:46 )  PTT:28.8 sec  Urinalysis Basic - ( 01 Jan 2024 15:52 )    Color: x / Appearance: x / SG: x / pH: x  Gluc: 247 mg/dL / Ketone: x  / Bili: x / Urobili: x   Blood: x / Protein: x / Nitrite: x   Leuk Esterase: x / RBC: x / WBC x   Sq Epi: x / Non Sq Epi: x / Bacteria: x        RADIOLOGY & ADDITIONAL TESTS:  Reviewed

## 2024-01-02 NOTE — PROGRESS NOTE ADULT - SUBJECTIVE AND OBJECTIVE BOX
SUBJECTIVE / INTERVAL HPI: Patient was seen and examined this morning.     CAPILLARY BLOOD GLUCOSE & INSULIN RECEIVED  558 mg/dL (01-01 @ 02:28)  488 mg/dL (01-01 @ 02:36)  453 mg/dL (01-01 @ 03:35)  420 mg/dL (01-01 @ 04:33)  404 mg/dL (01-01 @ 04:36)  268 mg/dL (01-01 @ 05:32)  213 mg/dL (01-01 @ 06:18)  138 mg/dL (01-01 @ 07:26)  142 mg/dL (01-01 @ 08:30)  160 mg/dL (01-01 @ 09:28)  179 mg/dL (01-01 @ 10:31)  239 mg/dL (01-01 @ 11:35)  310 mg/dL (01-01 @ 12:47)  363 mg/dL (01-01 @ 13:36)  414 mg/dL (01-01 @ 14:32)  442 mg/dL (01-01 @ 15:42)  247 mg/dL (01-01 @ 15:52)  305 mg/dL (01-01 @ 16:49)  290 mg/dL (01-01 @ 17:53)  140 mg/dL (01-01 @ 18:46)  113 mg/dL (01-01 @ 19:39)  165 mg/dL (01-01 @ 20:39)  171 mg/dL (01-01 @ 21:36)  241 mg/dL (01-01 @ 23:22)  307 mg/dL (01-02 @ 01:34)  313 mg/dL (01-02 @ 03:37)  330 mg/dL (01-02 @ 05:30)  342 mg/dL (01-02 @ 06:07)  309 mg/dL (01-02 @ 07:27)  184 mg/dL (01-02 @ 09:18)  237 mg/dL (01-02 @ 11:32)      REVIEW OF SYSTEMS  Constitutional:  Negative fever, chills or loss of appetite.  Eyes:  Negative blurry vision or double vision.  Cardiovascular:  Negative for chest pain or palpitations.  Respiratory:  Negative for cough, wheezing, or shortness of breath.    Gastrointestinal:  Negative for nausea, vomiting, diarrhea, constipation, or abdominal pain.  Genitourinary:  Negative frequency, urgency or dysuria.  Neurologic:  No headache, confusion, dizziness, lightheadedness.    PHYSICAL EXAM  Vital Signs Last 24 Hrs  T(C): 36.9 (02 Jan 2024 10:00), Max: 36.9 (02 Jan 2024 10:00)  T(F): 98.5 (02 Jan 2024 10:00), Max: 98.5 (02 Jan 2024 10:00)  HR: 88 (02 Jan 2024 12:00) (79 - 109)  BP: 127/62 (02 Jan 2024 12:00) (114/70 - 155/91)  BP(mean): 88 (02 Jan 2024 12:00) (84 - 116)  RR: 24 (02 Jan 2024 12:00) (15 - 36)  SpO2: 100% (02 Jan 2024 12:00) (97% - 100%)    Parameters below as of 02 Jan 2024 12:00  Patient On (Oxygen Delivery Method): room air        Constitutional: Awake, alert, in no acute distress.   HEENT: Normocephalic, atraumatic, NAOMI.  Respiratory: Lungs clear to ausculation bilaterally.   Cardiovascular: regular rhythm, normal S1 and S2, no audible murmurs.   GI: soft, non-tender, non-distended, bowel sounds present.  Extremities: No lower extremity edema.  Psychiatric: AAO x 3. Normal affect/mood.     LABS  CBC - WBC/HGB/HTC/PLT: 10.28/12.0/40.2/382 (01-02-24)  BMP - Na/K/Cl/Bicarb/BUN/Cr/Gluc/AG/eGFR: 132/4.5/104/19/16/0.82/330/9/91 (01-02-24)  Ca - 8.6 (01-02-24)  Phos - 2.3 (01-02-24)  Mg - 1.9 (01-02-24)  LFT - Alb/Tprot/Tbili/Dbili/AlkPhos/ALT/AST: 2.8/--/<0.2/--/167/22/31 (01-02-24)  PT/aPTT/INR: 11.5/28.8/1.01 (12-31-23)   Thyroid Stimulating Hormone, Serum: 0.797 (12-31-23)      MEDICATIONS  MEDICATIONS  (STANDING):  apixaban 5 milliGRAM(s) Oral every 12 hours  atorvastatin 80 milliGRAM(s) Oral at bedtime  chlorhexidine 2% Cloths 1 Application(s) Topical <User Schedule>  clopidogrel Tablet 75 milliGRAM(s) Oral daily  clotrimazole 1% Cream 1 Application(s) Topical two times a day  dextrose 5%. 1000 milliLiter(s) (50 mL/Hr) IV Continuous <Continuous>  dextrose 5%. 1000 milliLiter(s) (100 mL/Hr) IV Continuous <Continuous>  dextrose 50% Injectable 25 Gram(s) IV Push once  dextrose 50% Injectable 25 Gram(s) IV Push once  dextrose 50% Injectable 12.5 Gram(s) IV Push once  ertapenem  IVPB 1000 milliGRAM(s) IV Intermittent every 24 hours  glucagon  Injectable 1 milliGRAM(s) IntraMuscular once  insulin lispro (ADMELOG) corrective regimen sliding scale   SubCutaneous Before meals and at bedtime  insulin lispro Injectable (ADMELOG) 35 Unit(s) SubCutaneous three times a day before meals  insulin NPH human recombinant 65 Unit(s) SubCutaneous <User Schedule>  linezolid  IVPB      linezolid  IVPB 600 milliGRAM(s) IV Intermittent every 12 hours  metoprolol succinate ER 50 milliGRAM(s) Oral daily  pantoprazole    Tablet 40 milliGRAM(s) Oral every 24 hours    MEDICATIONS  (PRN):  dextrose Oral Gel 15 Gram(s) Oral once PRN Blood Glucose LESS THAN 70 milliGRAM(s)/deciliter  oxyCODONE    IR 10 milliGRAM(s) Oral every 4 hours PRN Severe Pain (7 - 10)    ASSESSMENT / RECOMMENDATIONS    A1C: 16.4 %  BUN: 16  Creatinine: 0.82  GFR: 91  Weight: 99.8  BMI:   EF:     # Type 2 diabetes mellitus with hyperglycemia  - Please continue lantus *** units at bedtime.   - Continue lispro *** units before each meal.  - Continue lispro moderate / low dose sliding scale before meals and at bedtime.  - Patient's fingerstick glucose goal is 100-180 mg/dL.    - For discharge, patient can ***.    - Patient can follow up at discharge with [facility] by calling [phone] to make an appointment.      Case discussed with Dr. Bowden. Primary team updated.          SUBJECTIVE / INTERVAL HPI: Patient was seen and examined this morning.     CAPILLARY BLOOD GLUCOSE & INSULIN RECEIVED  558 mg/dL (01-01 @ 02:28)  488 mg/dL (01-01 @ 02:36)  453 mg/dL (01-01 @ 03:35)  420 mg/dL (01-01 @ 04:33)  404 mg/dL (01-01 @ 04:36)  268 mg/dL (01-01 @ 05:32)  213 mg/dL (01-01 @ 06:18)  138 mg/dL (01-01 @ 07:26)  142 mg/dL (01-01 @ 08:30)  160 mg/dL (01-01 @ 09:28)  179 mg/dL (01-01 @ 10:31)  239 mg/dL (01-01 @ 11:35)  310 mg/dL (01-01 @ 12:47)  363 mg/dL (01-01 @ 13:36)  414 mg/dL (01-01 @ 14:32)  442 mg/dL (01-01 @ 15:42)  247 mg/dL (01-01 @ 15:52)  305 mg/dL (01-01 @ 16:49)  290 mg/dL (01-01 @ 17:53)  140 mg/dL (01-01 @ 18:46)  113 mg/dL (01-01 @ 19:39)  165 mg/dL (01-01 @ 20:39)  171 mg/dL (01-01 @ 21:36)  241 mg/dL (01-01 @ 23:22)  307 mg/dL (01-02 @ 01:34)  313 mg/dL (01-02 @ 03:37)  330 mg/dL (01-02 @ 05:30)  342 mg/dL (01-02 @ 06:07)  309 mg/dL (01-02 @ 07:27)  184 mg/dL (01-02 @ 09:18)  237 mg/dL (01-02 @ 11:32)      REVIEW OF SYSTEMS  Constitutional:  Negative fever, chills or loss of appetite.  Eyes:  Negative blurry vision or double vision.  Cardiovascular:  Negative for chest pain or palpitations.  Respiratory:  Negative for cough, wheezing, or shortness of breath.    Gastrointestinal:  Negative for nausea, vomiting, diarrhea, constipation, or abdominal pain.  Genitourinary:  Negative frequency, urgency or dysuria.  Neurologic:  No headache, confusion, dizziness, lightheadedness.    PHYSICAL EXAM  Vital Signs Last 24 Hrs  T(C): 36.9 (02 Jan 2024 10:00), Max: 36.9 (02 Jan 2024 10:00)  T(F): 98.5 (02 Jan 2024 10:00), Max: 98.5 (02 Jan 2024 10:00)  HR: 88 (02 Jan 2024 12:00) (79 - 109)  BP: 127/62 (02 Jan 2024 12:00) (114/70 - 155/91)  BP(mean): 88 (02 Jan 2024 12:00) (84 - 116)  RR: 24 (02 Jan 2024 12:00) (15 - 36)  SpO2: 100% (02 Jan 2024 12:00) (97% - 100%)    Parameters below as of 02 Jan 2024 12:00  Patient On (Oxygen Delivery Method): room air        Constitutional: Awake, alert, in no acute distress.   HEENT: Normocephalic, atraumatic, NAOMI.  Respiratory: Lungs clear to ausculation bilaterally.   Cardiovascular: regular rhythm, normal S1 and S2, no audible murmurs.   GI: soft, non-tender, non-distended, bowel sounds present.  Extremities: No lower extremity edema.  Psychiatric: AAO x 3. Normal affect/mood.     LABS  CBC - WBC/HGB/HTC/PLT: 10.28/12.0/40.2/382 (01-02-24)  BMP - Na/K/Cl/Bicarb/BUN/Cr/Gluc/AG/eGFR: 132/4.5/104/19/16/0.82/330/9/91 (01-02-24)  Ca - 8.6 (01-02-24)  Phos - 2.3 (01-02-24)  Mg - 1.9 (01-02-24)  LFT - Alb/Tprot/Tbili/Dbili/AlkPhos/ALT/AST: 2.8/--/<0.2/--/167/22/31 (01-02-24)  PT/aPTT/INR: 11.5/28.8/1.01 (12-31-23)   Thyroid Stimulating Hormone, Serum: 0.797 (12-31-23)      MEDICATIONS  MEDICATIONS  (STANDING):  apixaban 5 milliGRAM(s) Oral every 12 hours  atorvastatin 80 milliGRAM(s) Oral at bedtime  chlorhexidine 2% Cloths 1 Application(s) Topical <User Schedule>  clopidogrel Tablet 75 milliGRAM(s) Oral daily  clotrimazole 1% Cream 1 Application(s) Topical two times a day  dextrose 5%. 1000 milliLiter(s) (50 mL/Hr) IV Continuous <Continuous>  dextrose 5%. 1000 milliLiter(s) (100 mL/Hr) IV Continuous <Continuous>  dextrose 50% Injectable 25 Gram(s) IV Push once  dextrose 50% Injectable 25 Gram(s) IV Push once  dextrose 50% Injectable 12.5 Gram(s) IV Push once  ertapenem  IVPB 1000 milliGRAM(s) IV Intermittent every 24 hours  glucagon  Injectable 1 milliGRAM(s) IntraMuscular once  insulin lispro (ADMELOG) corrective regimen sliding scale   SubCutaneous Before meals and at bedtime  insulin lispro Injectable (ADMELOG) 35 Unit(s) SubCutaneous three times a day before meals  insulin NPH human recombinant 65 Unit(s) SubCutaneous <User Schedule>  linezolid  IVPB      linezolid  IVPB 600 milliGRAM(s) IV Intermittent every 12 hours  metoprolol succinate ER 50 milliGRAM(s) Oral daily  pantoprazole    Tablet 40 milliGRAM(s) Oral every 24 hours    MEDICATIONS  (PRN):  dextrose Oral Gel 15 Gram(s) Oral once PRN Blood Glucose LESS THAN 70 milliGRAM(s)/deciliter  oxyCODONE    IR 10 milliGRAM(s) Oral every 4 hours PRN Severe Pain (7 - 10)    ASSESSMENT / RECOMMENDATIONS    A1C: 16.4 %  BUN: 16  Creatinine: 0.82  GFR: 91  Weight: 99.8  BMI:   EF:     # Type 2 diabetes mellitus with hyperglycemia  - Please continue lantus *** units at bedtime.   - Continue lispro *** units before each meal.  - Continue lispro moderate / low dose sliding scale before meals and at bedtime.  - Patient's fingerstick glucose goal is 100-180 mg/dL.    - For discharge, patient can ***.    - Patient can follow up at discharge with [facility] by calling [phone] to make an appointment.      Case discussed with Dr. Bowden. Primary team updated.          SUBJECTIVE / INTERVAL HPI: Patient was seen and examined this morning. Glucose improved but still above target. Eating usual hospital meals. Denies eating anything overnight.    CAPILLARY BLOOD GLUCOSE & INSULIN RECEIVED  179 mg/dL (01-01 @ 10:31) - Lantus 50 + lispro 20+2  239 mg/dL (01-01 @ 11:35)  310 mg/dL (01-01 @ 12:47)  363 mg/dL (01-01 @ 13:36)  414 mg/dL (01-01 @ 14:32) - Lispro 12  442 mg/dL (01-01 @ 15:42)  247 mg/dL (01-01 @ 15:52)  305 mg/dL (01-01 @ 16:49) - NPH 65 + lispro 35+12+12  290 mg/dL (01-01 @ 17:53)  140 mg/dL (01-01 @ 18:46)  113 mg/dL (01-01 @ 19:39)  165 mg/dL (01-01 @ 20:39)  171 mg/dL (01-01 @ 21:36)  241 mg/dL (01-01 @ 23:22) - NPH 65 + lispro 6  307 mg/dL (01-02 @ 01:34)  313 mg/dL (01-02 @ 03:37)  330 mg/dL (01-02 @ 05:30)  342 mg/dL (01-02 @ 06:07)  309 mg/dL (01-02 @ 07:27) - NPH 65 + lispro 35+12  184 mg/dL (01-02 @ 09:18)  237 mg/dL (01-02 @ 11:32)      REVIEW OF SYSTEMS  Constitutional:  Negative fever, chills or loss of appetite.  Cardiovascular:  Negative for chest pain or palpitations.  Respiratory:  Negative for cough, wheezing, or shortness of breath.   Gastrointestinal:  Negative for nausea, vomiting, diarrhea, constipation, or abdominal pain.  Genitourinary:  (+) erythema, burning and pruritus of vulva. Negative frequency, urgency or dysuria.  Neurologic:  No headache, confusion, dizziness, lightheadedness.    PHYSICAL EXAM  Vital Signs Last 24 Hrs  T(C): 36.9 (02 Jan 2024 10:00), Max: 36.9 (02 Jan 2024 10:00)  T(F): 98.5 (02 Jan 2024 10:00), Max: 98.5 (02 Jan 2024 10:00)  HR: 88 (02 Jan 2024 12:00) (79 - 109)  BP: 127/62 (02 Jan 2024 12:00) (114/70 - 155/91)  BP(mean): 88 (02 Jan 2024 12:00) (84 - 116)  RR: 24 (02 Jan 2024 12:00) (15 - 36)  SpO2: 100% (02 Jan 2024 12:00) (97% - 100%)    Parameters below as of 02 Jan 2024 12:00  Patient On (Oxygen Delivery Method): room air    Constitutional: Awake, alert, obese female, in no acute distress.   HEENT: Normocephalic, atraumatic, NAOMI.  Respiratory: Lungs clear to ausculation bilaterally.   Cardiovascular: regular rhythm, normal S1 and S2, no audible murmurs.   GI: soft, non-tender, non-distended, bowel sounds present. + ostomy  Extremities: No lower extremity edema.  Psychiatric: AAO x 3.     LABS  CBC - WBC/HGB/HTC/PLT: 10.28/12.0/40.2/382 (01-02-24)  BMP - Na/K/Cl/Bicarb/BUN/Cr/Gluc/AG/eGFR: 132/4.5/104/19/16/0.82/330/9/91 (01-02-24)  Ca - 8.6 (01-02-24)  Phos - 2.3 (01-02-24)  Mg - 1.9 (01-02-24)  LFT - Alb/Tprot/Tbili/Dbili/AlkPhos/ALT/AST: 2.8/--/<0.2/--/167/22/31 (01-02-24)  PT/aPTT/INR: 11.5/28.8/1.01 (12-31-23)   Thyroid Stimulating Hormone, Serum: 0.797 (12-31-23)      MEDICATIONS  MEDICATIONS  (STANDING):  apixaban 5 milliGRAM(s) Oral every 12 hours  atorvastatin 80 milliGRAM(s) Oral at bedtime  chlorhexidine 2% Cloths 1 Application(s) Topical <User Schedule>  clopidogrel Tablet 75 milliGRAM(s) Oral daily  clotrimazole 1% Cream 1 Application(s) Topical two times a day  dextrose 5%. 1000 milliLiter(s) (50 mL/Hr) IV Continuous <Continuous>  dextrose 5%. 1000 milliLiter(s) (100 mL/Hr) IV Continuous <Continuous>  dextrose 50% Injectable 25 Gram(s) IV Push once  dextrose 50% Injectable 25 Gram(s) IV Push once  dextrose 50% Injectable 12.5 Gram(s) IV Push once  ertapenem  IVPB 1000 milliGRAM(s) IV Intermittent every 24 hours  glucagon  Injectable 1 milliGRAM(s) IntraMuscular once  insulin lispro (ADMELOG) corrective regimen sliding scale   SubCutaneous Before meals and at bedtime  insulin lispro Injectable (ADMELOG) 35 Unit(s) SubCutaneous three times a day before meals  insulin NPH human recombinant 65 Unit(s) SubCutaneous <User Schedule>  linezolid  IVPB      linezolid  IVPB 600 milliGRAM(s) IV Intermittent every 12 hours  metoprolol succinate ER 50 milliGRAM(s) Oral daily  pantoprazole    Tablet 40 milliGRAM(s) Oral every 24 hours    MEDICATIONS  (PRN):  dextrose Oral Gel 15 Gram(s) Oral once PRN Blood Glucose LESS THAN 70 milliGRAM(s)/deciliter  oxyCODONE    IR 10 milliGRAM(s) Oral every 4 hours PRN Severe Pain (7 - 10)

## 2024-01-02 NOTE — PROGRESS NOTE ADULT - PROBLEM SELECTOR PLAN 3
Known h/o frequent UTIs 2/2 suprapubic cath (removed 6/2023), recent admission for klebsiella and E coli UTI. Also has chronic dysuria. UCx 7/2023 +MSSA and E. faecalis, s/o linezolid course. UA w/ WBCs >24.  - UCx 12/31: +MSSA  - continue Ertapenem 1 g IV qd x5days (12/31-1/4)  - continue Linezolid 600 mg PO q12h x5days (12/31-1/4)  - appreciate further ID recs

## 2024-01-02 NOTE — PROGRESS NOTE ADULT - PROBLEM SELECTOR PLAN 1
Ozmepic 0.25 mg approved thru 06/2024 # [id number].  Dexcom covered with $0 copay.  clinic will contact patient for appt. Ozmepic 0.25 mg approved thru 06/2024 # [id number].  Dexcom covered with $0 copay.  clinic will contact patient for appt. Type 2 diabetes mellitus with hyperglycemia / Steroid-induced hyperglycemia  - Please Increase NPH insulin 72 units every 8 hours (8AM, 4PM, 12AM).  - INCREASE lispro to 40 units three times daily before meals.   - Continue customized order for Lispro insulin correctional scale as follows (FSG = Fingerstick glucose) to be given before meals:       > -200, give 3 units      > -250, give 6 units      > -300, give 9 units      > -350, give 12 units      > -400, give 15 units      > -450, give 18 units      >  and above, give 21 units   - Patient's fingerstick glucose goal is 100-180 mg/dL.    - Discharge recommendations to be discussed. Ozmepic 0.25 mg approved thru 06/2024 # [id number]. Dexcom covered with $0 copay; will educate and place closer to discharge. Will consider restarting metformin at discharge as well.  - Patient can follow up at discharge with Dr. Joseph at [facility] by calling [phone] to make an appointment. Clinic will contact patient for appt.    Case discussed with Dr. Rose. Primary team updated. Type 2 diabetes mellitus with hyperglycemia / Steroid-induced hyperglycemia  - Please Increase NPH insulin 72 units every 8 hours (8AM, 4PM, 12AM).  - INCREASE lispro to 40 units three times daily before meals.   - Continue customized order for Lispro insulin correctional scale as follows (FSG = Fingerstick glucose) to be given before meals:       > -200, give 3 units      > -250, give 6 units      > -300, give 9 units      > -350, give 12 units      > -400, give 15 units      > -450, give 18 units      >  and above, give 21 units   - Patient's fingerstick glucose goal is 100-180 mg/dL.    - Discharge recommendations to be discussed. Ozmepic 0.25 mg approved thru 06/2024 # [id number]. Dexcom covered with $0 copay; will educate and place closer to discharge. Will consider restarting metformin at discharge as well.  - Patient can follow up at discharge with Dr. Joseph at [facility] by calling [phone] to make an appointment. Clinic will contact patient for appt.    Case discussed with Dr. Rose. Primary team updated.

## 2024-01-02 NOTE — PROGRESS NOTE ADULT - PROBLEM SELECTOR PLAN 5
#h/o abdominal necrotizing fasciitis   #colostomy hernia  Presented w/ groin pain, abdominal pain and N/V. Was hospitalized at [facility] 8/2023 for L groin abscess and purulent cellulitis. Known h/o abdominal necrotizing fascitis s/p ostomy placement. Has pain around ostomy site.  - CT A/P 12/31: no fluid collection or evidence of necrotizing fasciitis of L groin or L mid upper thigh  - consulted surgery, rec'd no intervention  - pain control w/ #h/o abdominal necrotizing fasciitis   #colostomy hernia  Presented w/ groin pain, abdominal pain and N/V. Was hospitalized at [facility] 8/2023 for L groin abscess and purulent cellulitis. Known h/o abdominal necrotizing fascitis s/p ostomy placement. Has pain around ostomy site.  - CT A/P 12/31: no fluid collection or evidence of necrotizing fasciitis of L groin or L mid upper thigh  - consulted surgery, rec'd no intervention  - pain control w/

## 2024-01-02 NOTE — PROGRESS NOTE ADULT - ASSESSMENT
43F PMH Asthma, CVA (11/2021; residual L>R weakness), PE (4/2023 on Eliquis), uncontrolled DM-2 (on insulin), HTN, HLD, hx abdominal necrotizing fasciitis (ostomy placement in 11/2021 with intubation from 11-12/2021),multiple abd hernia repairs, frequent UTIs, CAD s/p PCI presenting with worsening groin pain and erythema, found to have glucose >600 and AG in ED (now closed). Admitted to MICU for hyperglycemia requiring insulin gtt. On 1/1, pt weaned off insulin gtt, endo consulted, started NPH 65U TID and Lispro 35U TID. ID consulted for inguinal erythema, started Clotrimazole cream BID, ertapenem and linezolid.     NEURO  AAOx3  DEJON    CARDIOVASCULAR  #CAD  #Stable Angina  Know history of CAD with multiple prior stents. P/w CP intermittently at rest and with exertion, lightheadedness, and SOB. In 8/2023, pt admitted for tx of groin abscess however was transferred to cardiology for staged PCI. [facility] 06/26/23 w/ NAT x 1 pRCA (85%) and NAT x 1 RPLS (80%); residual dLCx/OM1 70%, mLAD 50%. staged PCI 08/01/2023 NAT x 1 mLCx/OM1 (70%), RCA: patent stents, LM: short segment, MLI, mLAD: 40%. Echo 06/23/23: Mild LVH. Hyperdynamic LVSF. Normal RV/RVSF. Admission EKG 12/31/23 stable, +sinus tachycardia.   - C/w home Plavix 75mg PO qd and Lipitor 80mg PO qd    #Essential HTN  Home med: metoprolol succinate ER 50mg PO qd  - C/w home med    PULMONARY  #History of PE  History of bilateral pulmonary emboli in segmental and subsegmental branches on the R and subsegmental branches on the L, diagnosed on CT chest during ED visit on 4/7/23. Home meds: Eliquis 5mg BID  - C/w Eliquis 5mg PO BID    GI  #h/o abdominal necrotizing fasciitis   #colostomy hernia  Pt presenting with groin pain and erythema, abd pain, n/v at home. Recently hospitalized at [facility] 8/2023 for L groin abscess and purulent cellulitis. H/o abdominal necrotizing fascitis s/p ostomy placement. Pt with pain around the ostomy, parastomal hernia palpated.   - Following surgery recs: no acute surgical intervention indicated at this time    RENAL  #HAGMA, improved.  On admission, found to have glucose >600, AG 20. Lactate 6.9, VBG pH 7.44. AG closed after receiving 3L NS in ED. Lactate downtrended to 1.8. BHB 0.4. Potassium 2.8. S/p aggressive K repletion, K of 4.2 on 1/1. S/p insulin gtt. HAGMA 2/2 lactic acidosis vs. elevated glucose.  - Continue to monitor    #Hypokalemia  K 2.8 on admission, slowly uptrending. Corrected Na ~138. S/p repletion of K. K of 4.2 on 1/1.   - Continue to monitor    #Hypophosphatemia.  Phos of 1.3 on 1/1.  - Repleted  - F/u AM phos level, replete as needed    ENDO  #Hyperglycemia  #Uncontrolled T2DM  Pt with h/o DM, endo consulted last admission, discharged w/ Humalog 60 units TID and Lispro 26 units TID. Pt states she takes Humalog 80U TID and Lispro 36U TID. Noted to be hyperglycemic throughout last hospitalization. Now p/w abd pain, n/v at home, found to have glc >600. Initially w/ HAGMA, however now closed x3. AG likely i/s/o lactic acidosis that resolved with fluid resuscitation. BHB 0.4. S/p 10U regular insulin in ED. Hb A1c 16.4. S/p fluids and insulin gtt. S/p maintenance fluids.   - Following endo recs  - C/w FSG check  - C/w ISS  - Started NPH 65U q8h  - Started Lispro 35U TID pre-meal -> decreased to 20U      ID  #Cellulitis of groin.   Presenting with worsening groin and lower abd erythema, on exam noted to have red irritated skin w/ desquamation. CTAP w/o abscess or fluid collection. s/p I&D of L labial abscess last admission. pt has multiple abx allergies.   - Following ID recs  - F/u Bcx (NGTD)  - C/w topical clotrimazole BID to area  - Started Ertapenem 1g IV qd (12/31- )  - Started Linezolid 600mg PO q12h (12/31- )    #Frequent UTI  Pt w/ history of UTI 2/2 suprapubic cath now removed 6/2023, recent admission for klebsiella and E coli UTI.  Chronic dysuria. UCx 7/2023 growing MSSA and E. faecalis, completed linezolid course. S/p linezolid 600mg x1 and ertapenem 1000mg x1 in ED. UA this admission with WBC >24. Ucx with MSSA. WBC trended up to 12.87 on 1/1.   - Following ID recs  - Abx as above    PROPHYLAXIS  F: S/p NS +40meq K @200cc/hr  E: K>4 Mg>2  N: Consistent carb diet  GI: Protonix 40mg PO qd  DVT: Eliquis 5mg PO BID  Dispo: MICU 43F PMH Asthma, CVA (11/2021; residual L>R weakness), PE (4/2023 on Eliquis), uncontrolled DM-2 (on insulin), HTN, HLD, hx abdominal necrotizing fasciitis (ostomy placement in 11/2021 with intubation from 11-12/2021),multiple abd hernia repairs, frequent UTIs, CAD s/p PCI presenting with worsening groin pain and erythema, found to have glucose >600 and AG in ED (now closed). Admitted to MICU for hyperglycemia requiring insulin gtt. On 1/1, pt weaned off insulin gtt, endo consulted, started NPH 65U TID and Lispro 35U TID. ID consulted for inguinal erythema, started Clotrimazole cream BID, ertapenem and linezolid.     NEURO  AAOx3  DEJON    CARDIOVASCULAR  #CAD  #Stable Angina  Know history of CAD with multiple prior stents. P/w CP intermittently at rest and with exertion, lightheadedness, and SOB. In 8/2023, pt admitted for tx of groin abscess however was transferred to cardiology for staged PCI. [facility] 06/26/23 w/ NAT x 1 pRCA (85%) and NAT x 1 RPLS (80%); residual dLCx/OM1 70%, mLAD 50%. staged PCI 08/01/2023 NAT x 1 mLCx/OM1 (70%), RCA: patent stents, LM: short segment, MLI, mLAD: 40%. Echo 06/23/23: Mild LVH. Hyperdynamic LVSF. Normal RV/RVSF. Admission EKG 12/31/23 stable, +sinus tachycardia.   - C/w home Plavix 75mg PO qd and Lipitor 80mg PO qd    #Essential HTN  Home med: metoprolol succinate ER 50mg PO qd  - C/w home med    PULMONARY  #History of PE  History of bilateral pulmonary emboli in segmental and subsegmental branches on the R and subsegmental branches on the L, diagnosed on CT chest during ED visit on 4/7/23. Home meds: Eliquis 5mg BID  - C/w Eliquis 5mg PO BID    GI  #h/o abdominal necrotizing fasciitis   #colostomy hernia  Pt presenting with groin pain and erythema, abd pain, n/v at home. Recently hospitalized at [facility] 8/2023 for L groin abscess and purulent cellulitis. H/o abdominal necrotizing fascitis s/p ostomy placement. Pt with pain around the ostomy, parastomal hernia palpated.   - Following surgery recs: no acute surgical intervention indicated at this time    RENAL  #HAGMA, improved.  On admission, found to have glucose >600, AG 20. Lactate 6.9, VBG pH 7.44. AG closed after receiving 3L NS in ED. Lactate downtrended to 1.8. BHB 0.4. Potassium 2.8. S/p aggressive K repletion, K of 4.2 on 1/1. S/p insulin gtt. HAGMA 2/2 lactic acidosis vs. elevated glucose.  - Continue to monitor    #Hypokalemia  K 2.8 on admission, slowly uptrending. Corrected Na ~138. S/p repletion of K. K of 4.2 on 1/1.   - Continue to monitor    #Hypophosphatemia.  Phos of 1.3 on 1/1.  - Repleted  - F/u AM phos level, replete as needed    ENDO  #Hyperglycemia  #Uncontrolled T2DM  Pt with h/o DM, endo consulted last admission, discharged w/ Humalog 60 units TID and Lispro 26 units TID. Pt states she takes Humalog 80U TID and Lispro 36U TID. Noted to be hyperglycemic throughout last hospitalization. Now p/w abd pain, n/v at home, found to have glc >600. Initially w/ HAGMA, however now closed x3. AG likely i/s/o lactic acidosis that resolved with fluid resuscitation. BHB 0.4. S/p 10U regular insulin in ED. Hb A1c 16.4. S/p fluids and insulin gtt. S/p maintenance fluids.   - Following endo recs  - C/w FSG check  - C/w ISS  - Started NPH 65U q8h  - Started Lispro 35U TID pre-meal -> decreased to 20U      ID  #Cellulitis of groin.   Presenting with worsening groin and lower abd erythema, on exam noted to have red irritated skin w/ desquamation. CTAP w/o abscess or fluid collection. s/p I&D of L labial abscess last admission. pt has multiple abx allergies.   - Following ID recs  - F/u Bcx (NGTD)  - C/w topical clotrimazole BID to area  - Started Ertapenem 1g IV qd (12/31- )  - Started Linezolid 600mg PO q12h (12/31- )    #Frequent UTI  Pt w/ history of UTI 2/2 suprapubic cath now removed 6/2023, recent admission for klebsiella and E coli UTI.  Chronic dysuria. UCx 7/2023 growing MSSA and E. faecalis, completed linezolid course. S/p linezolid 600mg x1 and ertapenem 1000mg x1 in ED. UA this admission with WBC >24. Ucx with MSSA. WBC trended up to 12.87 on 1/1.   - Following ID recs  - Abx as above    PROPHYLAXIS  F: S/p NS +40meq K @200cc/hr  E: K>4 Mg>2  N: Consistent carb diet  GI: Protonix 40mg PO qd  DVT: Eliquis 5mg PO BID  Dispo: MICU 43-year-old female with PMH Asthma, CVA (11/2021; residual L>R weakness), PE (4/2023 on Eliquis), uncontrolled DM-2 (on insulin), HTN, HLD, hx abdominal necrotizing fasciitis (ostomy placement in 11/2021 with intubation from 11-12/2021), multiple abd hernia repairs, frequent UTIs, CAD s/p PCI presenting with worsening groin pain and redness, found to have glucose >600 and AG in ED (now closed). Admitted to MICU for hyperglycemia requiring insulin gtt. On 1/1, pt weaned off insulin gtt, endo consulted, started NPH 65U TID and Lispro 35U TID. ID consulted for inguinal erythema, c/w intertrigo, started Clotrimazole cream BID, ertapenem and linezolid. On 1/2, pt's glucose is better controlled and is medically optimized for step down to [facility].     NEURO  AAOx3  DEJON    CARDIOVASCULAR  #CAD  #Stable Angina, resolved.  Know history of CAD with multiple prior stents; on home Plavix and Lipitor. On admission, p/w CP intermittently at rest and with exertion, lightheadedness, and SOB. [facility] 06/26/23 w/ NAT x 1 pRCA (85%) and NAT x 1 RPLS (80%); residual dLCx/OM1 70%, mLAD 50%. staged PCI 08/01/2023 NAT x 1 mLCx/OM1 (70%), RCA: patent stents, LM: short segment, MLI, mLAD: 40%. Echo 06/23/23: Mild LVH. Hyperdynamic LVSF. Normal RV/RVSF. In 8/2023, had staged PCI. Admission EKG 12/31/23 stable, +sinus tachycardia.   - C/w home Plavix 75mg PO qd and Lipitor 80mg PO qd    #Essential HTN  Home med: metoprolol succinate ER 50mg PO qd  - C/w home metoprolol succinate 50mg PO qd    PULMONARY  #History of PE  History of bilateral pulmonary emboli in segmental and subsegmental branches on the R and subsegmental branches on the L, diagnosed on CT chest during ED visit on 4/7/23. Home meds: Eliquis 5mg BID.   - C/w Eliquis 5mg PO BID    GI  #h/o abdominal necrotizing fasciitis   #colostomy hernia  Pt presenting with groin pain, abd pain, n/v at home. Recently hospitalized at [facility] 8/2023 for L groin abscess and purulent cellulitis. H/o abdominal necrotizing fascitis s/p ostomy placement. Pt with pain around the ostomy, parastomal hernia palpated. CTAP on 12/31: no fluid collection or evidence of necrotizing fasciitis of L groin or L mid upper thigh.   - Following surgery recs: no acute surgical intervention indicated at this time      RENAL  #HAGMA, improved.  On admission, found to have glucose >600, bicarb 14, AG 20. Lactate 6.9, VBG pH 7.44. AG closed after receiving 3L NS in ED. Lactate cleared. BHB 0.4. S/p insulin gtt. HAGMA 2/2 lactic acidosis vs. hyperglycemia. Bicarb of 19 on 1/2.   - Continue to monitor    #Hypokalemia, resolved.   K 2.8 on admission. S/p repletion of K. K of 4.5 on 1/2.   - Continue to monitor    #Hypophosphatemia.  Phos of 1.3 on 1/1, repleted. Phos of 2.3 on 1/2.   - Repleted  - F/u AM phos level, replete as needed    #Pseudohyponatremia.  Na of 132 on 1/2 with glucose of 313; corrected Na 135.  - Continue to monitor  - Tx hyperglycemia as below    ENDO  #Hyperglycemia  #Uncontrolled T2DM  Pt with h/o DM, endo consulted last admission, discharged w/ Humalog 60 units TID and Lispro 26 units TID. Pt states she takes Humalog 80U TID and Lispro 36U TID. Noted to be hyperglycemic throughout last hospitalization. Now p/w abd pain, n/v at home, found to have glc >600. Initially w/ HAGMA, however now closed x3. AG likely i/s/o lactic acidosis that resolved with fluid resuscitation. BHB 0.4. S/p 10U regular insulin in ED. Hb A1c 16.4. S/p fluids and insulin gtt. S/p maintenance fluids.   - Following endo recs  - C/w FSG check  - C/w ISS  - C/w NPH 65U q8h  - C/w Lispro 35U TID pre-meal     ID  #Intertrigo, bilateral inguinal regions.   Pt  with hx of L labial abscess (s/p drainage last admission) p/w with worsening groin redness, physical exam c/w intertrigo. CTAP w/o abscess or fluid collection.   - Following ID recs  - F/u Bcx (NGTD)  - C/w topical clotrimazole BID to area  - C/w Ertapenem 1g IV qd x5days (12/31- )  - C/w Linezolid 600mg PO q12h x5days (12/31- )    #Frequent UTI  Pt w/ history of UTI 2/2 suprapubic cath now removed 6/2023, recent admission for klebsiella and E coli UTI.  Chronic dysuria. UCx 7/2023 growing MSSA and E. faecalis, completed linezolid course. S/p linezolid 600mg x1 and ertapenem 1000mg x1 in ED. UA this admission with WBC >24. Ucx with MSSA. WBC trended up to 12.87 on 1/1.   - Following ID recs  - Abx as above    PROPHYLAXIS  F: S/p NS +40meq K @200cc/hr  E: K>4 Mg>2  N: Consistent carb diet  GI: Protonix 40mg PO qd  DVT: Eliquis 5mg PO BID  Dispo: RMF 43-year-old female with PMH Asthma, CVA (11/2021; residual L>R weakness), PE (4/2023 on Eliquis), uncontrolled DM-2 (on insulin), HTN, HLD, hx abdominal necrotizing fasciitis (ostomy placement in 11/2021 with intubation from 11-12/2021), multiple abd hernia repairs, frequent UTIs, CAD s/p PCI presenting with worsening groin pain and redness, found to have glucose >600 and AG in ED (now closed). Admitted to MICU for hyperglycemia requiring insulin gtt. On 1/1, pt weaned off insulin gtt, endo consulted, started NPH 65U TID and Lispro 35U TID. ID consulted for inguinal erythema, c/w intertrigo, started Clotrimazole cream BID, ertapenem and linezolid. On 1/2, pt's glucose is better controlled and is medically optimized for step down to RM[facility].     NEURO  AAOx3  DEJON    CARDIOVASCULAR  #CAD  #Stable Angina, resolved.  Know history of CAD with multiple prior stents; on home Plavix and Lipitor. On admission, p/w CP intermittently at rest and with exertion, lightheadedness, and SOB. [facility] 06/26/23 w/ NAT x 1 pRCA (85%) and NAT x 1 RPLS (80%); residual dLCx/OM1 70%, mLAD 50%. staged PCI 08/01/2023 NAT x 1 mLCx/OM1 (70%), RCA: patent stents, LM: short segment, MLI, mLAD: 40%. Echo 06/23/23: Mild LVH. Hyperdynamic LVSF. Normal RV/RVSF. In 8/2023, had staged PCI. Admission EKG 12/31/23 stable, +sinus tachycardia.   - C/w home Plavix 75mg PO qd and Lipitor 80mg PO qd    #Essential HTN  Home med: metoprolol succinate ER 50mg PO qd  - C/w home metoprolol succinate 50mg PO qd    PULMONARY  #History of PE  History of bilateral pulmonary emboli in segmental and subsegmental branches on the R and subsegmental branches on the L, diagnosed on CT chest during ED visit on 4/7/23. Home meds: Eliquis 5mg BID.   - C/w Eliquis 5mg PO BID    GI  #h/o abdominal necrotizing fasciitis   #colostomy hernia  Pt presenting with groin pain, abd pain, n/v at home. Recently hospitalized at [facility] 8/2023 for L groin abscess and purulent cellulitis. H/o abdominal necrotizing fascitis s/p ostomy placement. Pt with pain around the ostomy, parastomal hernia palpated. CTAP on 12/31: no fluid collection or evidence of necrotizing fasciitis of L groin or L mid upper thigh.   - Following surgery recs: no acute surgical intervention indicated at this time      RENAL  #HAGMA, improved.  On admission, found to have glucose >600, bicarb 14, AG 20. Lactate 6.9, VBG pH 7.44. AG closed after receiving 3L NS in ED. Lactate cleared. BHB 0.4. S/p insulin gtt. HAGMA 2/2 lactic acidosis vs. hyperglycemia. Bicarb of 19 on 1/2.   - Continue to monitor    #Hypokalemia, resolved.   K 2.8 on admission. S/p repletion of K. K of 4.5 on 1/2.   - Continue to monitor    #Hypophosphatemia.  Phos of 1.3 on 1/1, repleted. Phos of 2.3 on 1/2.   - Repleted  - F/u AM phos level, replete as needed    #Pseudohyponatremia.  Na of 132 on 1/2 with glucose of 313; corrected Na 135.  - Continue to monitor  - Tx hyperglycemia as below    ENDO  #Hyperglycemia  #Uncontrolled T2DM  Pt with h/o DM, endo consulted last admission, discharged w/ Humalog 60 units TID and Lispro 26 units TID. Pt states she takes Humalog 80U TID and Lispro 36U TID. Noted to be hyperglycemic throughout last hospitalization. Now p/w abd pain, n/v at home, found to have glc >600. Initially w/ HAGMA, however now closed x3. AG likely i/s/o lactic acidosis that resolved with fluid resuscitation. BHB 0.4. S/p 10U regular insulin in ED. Hb A1c 16.4. S/p fluids and insulin gtt. S/p maintenance fluids.   - Following endo recs  - C/w FSG check  - C/w ISS  - C/w NPH 65U q8h  - C/w Lispro 35U TID pre-meal     ID  #Intertrigo, bilateral inguinal regions.   Pt  with hx of L labial abscess (s/p drainage last admission) p/w with worsening groin redness, physical exam c/w intertrigo. CTAP w/o abscess or fluid collection.   - Following ID recs  - F/u Bcx (NGTD)  - C/w topical clotrimazole BID to area  - C/w Ertapenem 1g IV qd x5days (12/31- )  - C/w Linezolid 600mg PO q12h x5days (12/31- )    #Frequent UTI  Pt w/ history of UTI 2/2 suprapubic cath now removed 6/2023, recent admission for klebsiella and E coli UTI.  Chronic dysuria. UCx 7/2023 growing MSSA and E. faecalis, completed linezolid course. S/p linezolid 600mg x1 and ertapenem 1000mg x1 in ED. UA this admission with WBC >24. Ucx with MSSA. WBC trended up to 12.87 on 1/1.   - Following ID recs  - Abx as above    PROPHYLAXIS  F: S/p NS +40meq K @200cc/hr  E: K>4 Mg>2  N: Consistent carb diet  GI: Protonix 40mg PO qd  DVT: Eliquis 5mg PO BID  Dispo: RMF

## 2024-01-02 NOTE — PROGRESS NOTE ADULT - ATTENDING COMMENTS
44yo F PMH Asthma, CVA (11/2021; residual L>R weakness), PE (4/2023 on Eliquis), uncontrolled T2DM, HTN, HLD, h/o abdominal necrotizing fasciitis (s/p ostomy placement 11/2021 w/ intubation 11-12/2021), multiple abdominal hernia repairs, frequent UTIs, CAD s/p PCI presented 12/31 w/ worsening groin pain and redness, found to have glucose >600 w/ AG, admitted to MICU for insulin gtt, now hyperglycemia improving and on IV abx and fluconazole for groin intertrigo   - appreciate Endo recs, patient with hx of multiple admissions for hyperglycemia  - ID following for hx of UTIs and intertrigo, appreciate recs    Plan as above 42yo F PMH Asthma, CVA (11/2021; residual L>R weakness), PE (4/2023 on Eliquis), uncontrolled T2DM, HTN, HLD, h/o abdominal necrotizing fasciitis (s/p ostomy placement 11/2021 w/ intubation 11-12/2021), multiple abdominal hernia repairs, frequent UTIs, CAD s/p PCI presented 12/31 w/ worsening groin pain and redness, found to have glucose >600 w/ AG, admitted to MICU for insulin gtt, now hyperglycemia improving and on IV abx and fluconazole for groin intertrigo   - appreciate Endo recs, patient with hx of multiple admissions for hyperglycemia  - ID following for hx of UTIs and intertrigo, appreciate recs    Plan as above

## 2024-01-02 NOTE — PROGRESS NOTE ADULT - PROBLEM SELECTOR PLAN 7
Know history of CAD s/p multiple stents, takes Plavix 75 mg qd and Lipitor 80 mg qhs at home.   - [facility] 06/26/23: NAT x 1 pRCA (85%) and NAT x 1 RPLS (80%); residual dLCx/OM1 70%, mLAD 50%. staged PCI 08/01/2023 NAT x 1 mLCx/OM1 (70%), RCA: patent stents, LM: short segment, MLI, mLAD: 40%.  TTE 06/23/23: Mild LVH, hyperdynamic LV systolic function, normal RV sise and systolic function  s/p staged PCI 8/2023  Admission EKG 12/31/23 w/ sinus tachycardia  - continue plavix and lipitor Know history of CAD s/p multiple stents, takes Plavix 75 mg qd and Lipitor 80 mg qhs at home.   - [facility] 06/26/23: NAT x 1 pRCA (85%) and NAT x 1 RPLS (80%); residual dLCx/OM1 70%, mLAD 50%. staged PCI 08/01/2023 NAT x 1 mLCx/OM1 (70%), RCA: patent stents, LM: short segment, MLI, mLAD: 40%.  TTE 06/23/23: Mild LVH, hyperdynamic LV systolic function, normal RV sise and systolic function  s/p staged PCI 8/2023  Admission EKG 12/31/23 w/ sinus tachycardia  - continue plavix and lipitor

## 2024-01-02 NOTE — PROGRESS NOTE ADULT - SUBJECTIVE AND OBJECTIVE BOX
**INCOMPLETE NOTE    OVERNIGHT EVENTS:    SUBJECTIVE:  Patient seen and examined at bedside.    Vital Signs Last 12 Hrs  T(F): 97.8 (01-02-24 @ 14:00), Max: 98.5 (01-02-24 @ 10:00)  HR: 88 (01-02-24 @ 12:00) (82 - 92)  BP: 127/62 (01-02-24 @ 12:00) (127/62 - 155/91)  BP(mean): 88 (01-02-24 @ 12:00) (88 - 116)  RR: 24 (01-02-24 @ 12:00) (24 - 36)  SpO2: 100% (01-02-24 @ 12:00) (97% - 100%)  I&O's Summary    01 Jan 2024 07:01  -  02 Jan 2024 07:00  --------------------------------------------------------  IN: 1649.8 mL / OUT: 1550 mL / NET: 99.8 mL    02 Jan 2024 07:01  -  02 Jan 2024 16:21  --------------------------------------------------------  IN: 250 mL / OUT: 0 mL / NET: 250 mL        PHYSICAL EXAM:  Constitutional: NAD, comfortable in bed.  HEENT: NC/AT, PERRLA, EOMI, no conjunctival pallor or scleral icterus, MMM  Neck: Supple, no JVD  Respiratory: CTA B/L. No w/r/r.   Cardiovascular: RRR, normal S1 and S2, no m/r/g.   Gastrointestinal: +BS, soft NTND, no guarding or rebound tenderness, no palpable masses   Extremities: wwp; no cyanosis, clubbing or edema.   Vascular: Pulses equal and strong throughout.   Neurological: AAOx3, no CN deficits, strength and sensation intact throughout.   Skin: No gross skin abnormalities or rashes        LABS:                        12.0   10.28 )-----------( 382      ( 02 Jan 2024 05:30 )             40.2     01-02    132<L>  |  104  |  16  ----------------------------<  330<H>  4.5   |  19<L>  |  0.82    Ca    8.6      02 Jan 2024 05:30  Phos  2.3     01-02  Mg     1.9     01-02    TPro  6.7  /  Alb  2.8<L>  /  TBili  <0.2  /  DBili  x   /  AST  31  /  ALT  22  /  AlkPhos  167<H>  01-02      Urinalysis Basic - ( 02 Jan 2024 05:30 )    Color: x / Appearance: x / SG: x / pH: x  Gluc: 330 mg/dL / Ketone: x  / Bili: x / Urobili: x   Blood: x / Protein: x / Nitrite: x   Leuk Esterase: x / RBC: x / WBC x   Sq Epi: x / Non Sq Epi: x / Bacteria: x          RADIOLOGY & ADDITIONAL TESTS:    MEDICATIONS  (STANDING):  apixaban 5 milliGRAM(s) Oral every 12 hours  atorvastatin 80 milliGRAM(s) Oral at bedtime  chlorhexidine 2% Cloths 1 Application(s) Topical <User Schedule>  clopidogrel Tablet 75 milliGRAM(s) Oral daily  clotrimazole 1% Cream 1 Application(s) Topical two times a day  dextrose 5%. 1000 milliLiter(s) (50 mL/Hr) IV Continuous <Continuous>  dextrose 5%. 1000 milliLiter(s) (100 mL/Hr) IV Continuous <Continuous>  dextrose 50% Injectable 25 Gram(s) IV Push once  dextrose 50% Injectable 12.5 Gram(s) IV Push once  dextrose 50% Injectable 25 Gram(s) IV Push once  ertapenem  IVPB 1000 milliGRAM(s) IV Intermittent every 24 hours  fluconAZOLE   Tablet 150 milliGRAM(s) Oral <User Schedule>  glucagon  Injectable 1 milliGRAM(s) IntraMuscular once  insulin lispro (ADMELOG) corrective regimen sliding scale   SubCutaneous Before meals and at bedtime  insulin lispro Injectable (ADMELOG) 35 Unit(s) SubCutaneous three times a day before meals  insulin NPH human recombinant 65 Unit(s) SubCutaneous <User Schedule>  linezolid  IVPB      linezolid  IVPB 600 milliGRAM(s) IV Intermittent every 12 hours  metoprolol succinate ER 50 milliGRAM(s) Oral daily  pantoprazole    Tablet 40 milliGRAM(s) Oral every 24 hours    MEDICATIONS  (PRN):  dextrose Oral Gel 15 Gram(s) Oral once PRN Blood Glucose LESS THAN 70 milliGRAM(s)/deciliter  oxyCODONE    IR 10 milliGRAM(s) Oral every 4 hours PRN Severe Pain (7 - 10)   ***Stepdown from MICU-->[facility]***    Hospital course: 42yo F PMH Asthma, CVA (11/2021; residual L>R weakness), PE (4/2023 on Eliquis), uncontrolled T2DM, HTN, HLD, h/o abdominal necrotizing fasciitis (s/p ostomy placement 11/2021 w/ intubation from 11-12/2021), multiple abdominal hernia repairs, frequent UTIs, CAD s/p PCI presented 12/31 w/ worsening groin pain and redness, found to have glucose >600 w/ AG, admitted to MICU for insulin gtt. Weaned off insulin gtt after 1 day, consulted endo for glucose management. Consulted ID for inguinal erythema vs infection, started antifungals and abx. Blood sugar better controlled, stable for stepdown to [facility] for further management.     SUBJECTIVE:  Patient seen and examined at bedside. Reports she is uncomfortable, mostly due to abdominal pain around her colostomy. Reports the skin findings in her inner thighs is reminiscent of the start of her prior serious infection, which is why she is worried this time that she has or will develop an infection.    Vital Signs Last 12 Hrs  T(F): 97.8 (01-02-24 @ 14:00), Max: 98.5 (01-02-24 @ 10:00)  HR: 88 (01-02-24 @ 12:00) (82 - 92)  BP: 127/62 (01-02-24 @ 12:00) (127/62 - 155/91)  BP(mean): 88 (01-02-24 @ 12:00) (88 - 116)  RR: 24 (01-02-24 @ 12:00) (24 - 36)  SpO2: 100% (01-02-24 @ 12:00) (97% - 100%)  I&O's Summary    01 Jan 2024 07:01  -  02 Jan 2024 07:00  --------------------------------------------------------  IN: 1649.8 mL / OUT: 1550 mL / NET: 99.8 mL    02 Jan 2024 07:01  -  02 Jan 2024 16:21  --------------------------------------------------------  IN: 250 mL / OUT: 0 mL / NET: 250 mL        PHYSICAL EXAM:  Constitutional: obese adult female lying in bed uncomfortable-appearing but not in any acute distress  HEENT: NC/AT, EOMI, MMM  Neck: Supple  Respiratory: CTAB but difficult to auscultate 2/2 body habitus  Cardiovascular: RRR, normal S1 and S2, no murmurs appreciated  Gastrointestinal: normal BS, tender to palpation around colostomy site which is c/d/i and without erythema or drainage  Extremities: WWP, no peripheral edema  Vascular: 2+ radial and DP pulses b/l  Neurological: AAOx3, moving all extremities spontaneously but with discomfort  Skin: erythema and dry flaky skin in groin, R worse than L, and area is TTP; patient not able to turn over but reports erythema and skin flaking extending to backside as well        LABS:                        12.0   10.28 )-----------( 382      ( 02 Jan 2024 05:30 )             40.2     01-02    132<L>  |  104  |  16  ----------------------------<  330<H>  4.5   |  19<L>  |  0.82    Ca    8.6      02 Jan 2024 05:30  Phos  2.3     01-02  Mg     1.9     01-02    TPro  6.7  /  Alb  2.8<L>  /  TBili  <0.2  /  DBili  x   /  AST  31  /  ALT  22  /  AlkPhos  167<H>  01-02      Urinalysis Basic - ( 02 Jan 2024 05:30 )    Color: x / Appearance: x / SG: x / pH: x  Gluc: 330 mg/dL / Ketone: x  / Bili: x / Urobili: x   Blood: x / Protein: x / Nitrite: x   Leuk Esterase: x / RBC: x / WBC x   Sq Epi: x / Non Sq Epi: x / Bacteria: x          RADIOLOGY & ADDITIONAL TESTS:    MEDICATIONS  (STANDING):  apixaban 5 milliGRAM(s) Oral every 12 hours  atorvastatin 80 milliGRAM(s) Oral at bedtime  chlorhexidine 2% Cloths 1 Application(s) Topical <User Schedule>  clopidogrel Tablet 75 milliGRAM(s) Oral daily  clotrimazole 1% Cream 1 Application(s) Topical two times a day  dextrose 5%. 1000 milliLiter(s) (50 mL/Hr) IV Continuous <Continuous>  dextrose 5%. 1000 milliLiter(s) (100 mL/Hr) IV Continuous <Continuous>  dextrose 50% Injectable 25 Gram(s) IV Push once  dextrose 50% Injectable 12.5 Gram(s) IV Push once  dextrose 50% Injectable 25 Gram(s) IV Push once  ertapenem  IVPB 1000 milliGRAM(s) IV Intermittent every 24 hours  fluconAZOLE   Tablet 150 milliGRAM(s) Oral <User Schedule>  glucagon  Injectable 1 milliGRAM(s) IntraMuscular once  insulin lispro (ADMELOG) corrective regimen sliding scale   SubCutaneous Before meals and at bedtime  insulin lispro Injectable (ADMELOG) 35 Unit(s) SubCutaneous three times a day before meals  insulin NPH human recombinant 65 Unit(s) SubCutaneous <User Schedule>  linezolid  IVPB      linezolid  IVPB 600 milliGRAM(s) IV Intermittent every 12 hours  metoprolol succinate ER 50 milliGRAM(s) Oral daily  pantoprazole    Tablet 40 milliGRAM(s) Oral every 24 hours    MEDICATIONS  (PRN):  dextrose Oral Gel 15 Gram(s) Oral once PRN Blood Glucose LESS THAN 70 milliGRAM(s)/deciliter  oxyCODONE    IR 10 milliGRAM(s) Oral every 4 hours PRN Severe Pain (7 - 10)

## 2024-01-02 NOTE — PROGRESS NOTE ADULT - PROBLEM SELECTOR PLAN 2
Known h/o L labial abscess (s/p drainage last admission 8/2023) now presented w/ worsening groin redness, physical exam c/w intertrigo. CT A/P w/o abscess or fluid collection. Consulted ID given complex infectious history. BCx NGTD  - continue topical clotrimazole BID to area  - continue Ertapenem 1 g IV qd x5days (12/31-1/4)  - continue Linezolid 600 mg PO q12h x5days (12/31-1/4)  - appreciate further ID recs Known h/o L labial abscess (s/p drainage last admission 8/2023) now presented w/ worsening groin redness, physical exam c/w intertrigo. CT A/P w/o abscess or fluid collection. Consulted ID given complex infectious history, thinking it is likely intertrigo as opposed to SSTI. BCx NGTD.  - continue topical clotrimazole cream BID to affected areas  - fluconazole 150 mg PO q-weekly x4 weeks for rash  - continue Ertapenem 1 g IV qd x5days (12/31-1/4)  - continue Linezolid 600 mg PO q12h x5days (12/31-1/4)  - appreciate further ID recs

## 2024-01-02 NOTE — PROGRESS NOTE ADULT - ASSESSMENT
Ms. Katz is a 43-year-old female with a past medical history of uncontrolled type 2 diabetes mellitus, hypertension, hyperlipidemia, CVA (11/2021 with left sided residual weakness), pulmonary embolism (4/2023, on eliquis), coronary artery disease (PCI s/p NAT 7/2027 and 8/2027), history of abdominal necrotizing fasciitis (ostomy placement in 11/2021), multiple abdominal hernia repairs, recurrent UTI and previous admission for left labial abscess (8/2023) who presented to the emergency department complaining of left groin swelling that progressively increased over the previous 3 weeks being treated for intertrigo with low suspicion of necrotizing fascitis.    A1C: 16.4 %  BUN: 16  Creatinine: 0.82  GFR: 91  Weight: 99.8

## 2024-01-02 NOTE — PROGRESS NOTE ADULT - ASSESSMENT
Overall patient has not manifested systemic signs of infection. No fever/leukocytosis. The redness affecting her groin has been present for >1 month, and is likely intertrigo, and unlikely bacterial. However, patient feels strongly that this groin rash is a bacterial infection and she is very worried that it is nec fasc again. There are no signs of this on physical exam by me or surgical team, or on CT. However, given patient's history of necrotizing fasciitis and risk factors for Solomon's given uncontrolled diabetes and glucosuria, I think it is reasonable to continue broad spectrum abx for now as below, in addition to treatment for intertrigo.    # Groin redness - likely intertrigo rather than SSTI  - Continue clotrimazole cream BID to affected areas  - Start fluconazole 150mg PO weekly x 4 weeks  - Recommended skin scraping for KOH prep and fungal culture but patient declined this despite extensive counseling.  - For now, continue linezolid 600mg IV BID and ertapenem 1g IV q24h. Anticipate short course.    # MSSA bacteriuria - likely colonization  - Patient has had chronic MSSA bacteriuria likely related to prior suprapubic cath (now removed). Never had bacteremia with this, and current BCx are no growth to date. Patient has no systemic signs of infection, and no evidence of bone/joint/heart metastatic MSSA infection on exam. She has no urinary symptoms, and CT without any urinary tract abnormalities. Antibiotics are as above for now, and will follow blood cultures.    ID Team 2 will follow

## 2024-01-02 NOTE — PROGRESS NOTE ADULT - PROBLEM SELECTOR PLAN 1
Known h/o IDDM, endocrine consulted last admission for management, discharged pt on Humalog 60u TID + Lispro 26u TID. Pt states she takes Humalog 80u TID and Lispro 36u TID at home. Noted to be hyperglycemic throughout last hospitalization. This hospitalization presented w/ abd pain, N/V and found to have glucose >600 and AG on admission. A1c 16.4% (1/1). S/p insulin gtt + IVF, now taking PO.  - continue NPH 75u q8h  - continue Lispro 40u TID pre-meal  - continue mISS  - appreciate further endo recs

## 2024-01-02 NOTE — PROGRESS NOTE ADULT - ASSESSMENT
42yo F PMH Asthma, CVA (11/2021; residual L>R weakness), PE (4/2023 on Eliquis), uncontrolled T2DM, HTN, HLD, h/o abdominal necrotizing fasciitis (s/p ostomy placement 11/2021 w/ intubation 11-12/2021), multiple abdominal hernia repairs, frequent UTIs, CAD s/p PCI presented 12/31 w/ worsening groin pain and redness, found to have glucose >600 w/ AG, admitted to MICU for insulin gtt, now hyperglycemia resolved and on IV abx for groin infection and stepped down to [facility]. 44yo F PMH Asthma, CVA (11/2021; residual L>R weakness), PE (4/2023 on Eliquis), uncontrolled T2DM, HTN, HLD, h/o abdominal necrotizing fasciitis (s/p ostomy placement 11/2021 w/ intubation 11-12/2021), multiple abdominal hernia repairs, frequent UTIs, CAD s/p PCI presented 12/31 w/ worsening groin pain and redness, found to have glucose >600 w/ AG, admitted to MICU for insulin gtt, now hyperglycemia resolved and on IV abx for groin infection and stepped down to [facility].

## 2024-01-02 NOTE — PROGRESS NOTE ADULT - SUBJECTIVE AND OBJECTIVE BOX
INFECTIOUS DISEASES CONSULT FOLLOW-UP NOTE    INTERVAL HPI/OVERNIGHT EVENTS:  Afebrile, vitals normal, WBC normal, pending stepdown today  Reports persistent inguinal pain    ROS:   Constitutional, eyes, ENT, cardiovascular, respiratory, gastrointestinal, genitourinary, integumentary, neurological, psychiatric and heme/lymph are otherwise negative other than noted above       ANTIBIOTICS/RELEVANT:    MEDICATIONS  (STANDING):  apixaban 5 milliGRAM(s) Oral every 12 hours  atorvastatin 80 milliGRAM(s) Oral at bedtime  chlorhexidine 2% Cloths 1 Application(s) Topical <User Schedule>  clopidogrel Tablet 75 milliGRAM(s) Oral daily  clotrimazole 1% Cream 1 Application(s) Topical two times a day  dextrose 5%. 1000 milliLiter(s) (50 mL/Hr) IV Continuous <Continuous>  dextrose 5%. 1000 milliLiter(s) (100 mL/Hr) IV Continuous <Continuous>  dextrose 50% Injectable 25 Gram(s) IV Push once  dextrose 50% Injectable 25 Gram(s) IV Push once  dextrose 50% Injectable 12.5 Gram(s) IV Push once  ertapenem  IVPB 1000 milliGRAM(s) IV Intermittent every 24 hours  fluconAZOLE   Tablet 150 milliGRAM(s) Oral <User Schedule>  glucagon  Injectable 1 milliGRAM(s) IntraMuscular once  insulin lispro (ADMELOG) corrective regimen sliding scale   SubCutaneous Before meals and at bedtime  insulin lispro Injectable (ADMELOG) 40 Unit(s) SubCutaneous three times a day before meals  insulin NPH human recombinant 72 Unit(s) SubCutaneous <User Schedule>  linezolid  IVPB 600 milliGRAM(s) IV Intermittent every 12 hours  linezolid  IVPB      metoprolol succinate ER 50 milliGRAM(s) Oral daily  pantoprazole    Tablet 40 milliGRAM(s) Oral every 24 hours    MEDICATIONS  (PRN):  dextrose Oral Gel 15 Gram(s) Oral once PRN Blood Glucose LESS THAN 70 milliGRAM(s)/deciliter  oxyCODONE    IR 10 milliGRAM(s) Oral every 4 hours PRN Severe Pain (7 - 10)        Vital Signs Last 24 Hrs  T(C): 36.6 (02 Jan 2024 14:00), Max: 36.9 (02 Jan 2024 10:00)  T(F): 97.8 (02 Jan 2024 14:00), Max: 98.5 (02 Jan 2024 10:00)  HR: 92 (02 Jan 2024 16:00) (79 - 109)  BP: 142/65 (02 Jan 2024 16:00) (114/70 - 155/91)  BP(mean): 93 (02 Jan 2024 16:00) (84 - 116)  RR: 28 (02 Jan 2024 16:00) (15 - 36)  SpO2: 100% (02 Jan 2024 16:00) (97% - 100%)    Parameters below as of 02 Jan 2024 16:00  Patient On (Oxygen Delivery Method): room air        01-01-24 @ 07:01  -  01-02-24 @ 07:00  --------------------------------------------------------  IN: 1649.8 mL / OUT: 1550 mL / NET: 99.8 mL    01-02-24 @ 07:01  -  01-02-24 @ 18:18  --------------------------------------------------------  IN: 610 mL / OUT: 500 mL / NET: 110 mL      PHYSICAL EXAM:  Constitutional: alert, NAD  Eyes: the sclera and conjunctiva were normal.   ENT: the ears and nose were normal in appearance.   Mouth: Poor dentition  Neck: the appearance of the neck was normal and the neck was supple.   Pulmonary: no respiratory distress and lungs were clear to auscultation bilaterally.   Heart: heart rate was normal and rhythm regular, normal S1 and S2, no murmur  Abdomen: Ostomy w/ small volume liquid stool  MSK: No joint swelling/pain. No midline spine tenderness  Skin: Redness in bilateral inguinal folds, inner thighs, underneath pannus, and in perineal and joey-anal areas with satellite lesions. Mildly tender. No fluctuance or crepitus.         LABS:                        12.0   10.28 )-----------( 382      ( 02 Jan 2024 05:30 )             40.2     01-02    132<L>  |  104  |  16  ----------------------------<  330<H>  4.5   |  19<L>  |  0.82    Ca    8.6      02 Jan 2024 05:30  Phos  2.3     01-02  Mg     1.9     01-02    TPro  6.7  /  Alb  2.8<L>  /  TBili  <0.2  /  DBili  x   /  AST  31  /  ALT  22  /  AlkPhos  167<H>  01-02      Urinalysis Basic - ( 02 Jan 2024 05:30 )    Color: x / Appearance: x / SG: x / pH: x  Gluc: 330 mg/dL / Ketone: x  / Bili: x / Urobili: x   Blood: x / Protein: x / Nitrite: x   Leuk Esterase: x / RBC: x / WBC x   Sq Epi: x / Non Sq Epi: x / Bacteria: x        MICROBIOLOGY:      RADIOLOGY & ADDITIONAL STUDIES:  Reviewed

## 2024-01-03 ENCOUNTER — TRANSCRIPTION ENCOUNTER (OUTPATIENT)
Age: 44
End: 2024-01-03

## 2024-01-03 DIAGNOSIS — R73.9 HYPERGLYCEMIA, UNSPECIFIED: ICD-10-CM

## 2024-01-03 LAB
ALBUMIN SERPL ELPH-MCNC: 2.3 G/DL — LOW (ref 3.3–5)
ALBUMIN SERPL ELPH-MCNC: 2.3 G/DL — LOW (ref 3.3–5)
ALP SERPL-CCNC: 138 U/L — HIGH (ref 40–120)
ALP SERPL-CCNC: 138 U/L — HIGH (ref 40–120)
ALT FLD-CCNC: 14 U/L — SIGNIFICANT CHANGE UP (ref 10–45)
ALT FLD-CCNC: 14 U/L — SIGNIFICANT CHANGE UP (ref 10–45)
ANION GAP SERPL CALC-SCNC: 7 MMOL/L — SIGNIFICANT CHANGE UP (ref 5–17)
ANION GAP SERPL CALC-SCNC: 7 MMOL/L — SIGNIFICANT CHANGE UP (ref 5–17)
AST SERPL-CCNC: 25 U/L — SIGNIFICANT CHANGE UP (ref 10–40)
AST SERPL-CCNC: 25 U/L — SIGNIFICANT CHANGE UP (ref 10–40)
BASOPHILS # BLD AUTO: 0.02 K/UL — SIGNIFICANT CHANGE UP (ref 0–0.2)
BASOPHILS # BLD AUTO: 0.02 K/UL — SIGNIFICANT CHANGE UP (ref 0–0.2)
BASOPHILS NFR BLD AUTO: 0.2 % — SIGNIFICANT CHANGE UP (ref 0–2)
BASOPHILS NFR BLD AUTO: 0.2 % — SIGNIFICANT CHANGE UP (ref 0–2)
BILIRUB SERPL-MCNC: <0.2 MG/DL — SIGNIFICANT CHANGE UP (ref 0.2–1.2)
BILIRUB SERPL-MCNC: <0.2 MG/DL — SIGNIFICANT CHANGE UP (ref 0.2–1.2)
BUN SERPL-MCNC: 15 MG/DL — SIGNIFICANT CHANGE UP (ref 7–23)
BUN SERPL-MCNC: 15 MG/DL — SIGNIFICANT CHANGE UP (ref 7–23)
CALCIUM SERPL-MCNC: 8.3 MG/DL — LOW (ref 8.4–10.5)
CALCIUM SERPL-MCNC: 8.3 MG/DL — LOW (ref 8.4–10.5)
CHLORIDE SERPL-SCNC: 106 MMOL/L — SIGNIFICANT CHANGE UP (ref 96–108)
CHLORIDE SERPL-SCNC: 106 MMOL/L — SIGNIFICANT CHANGE UP (ref 96–108)
CO2 SERPL-SCNC: 20 MMOL/L — LOW (ref 22–31)
CO2 SERPL-SCNC: 20 MMOL/L — LOW (ref 22–31)
CREAT SERPL-MCNC: 0.85 MG/DL — SIGNIFICANT CHANGE UP (ref 0.5–1.3)
CREAT SERPL-MCNC: 0.85 MG/DL — SIGNIFICANT CHANGE UP (ref 0.5–1.3)
EGFR: 87 ML/MIN/1.73M2 — SIGNIFICANT CHANGE UP
EGFR: 87 ML/MIN/1.73M2 — SIGNIFICANT CHANGE UP
EOSINOPHIL # BLD AUTO: 0.11 K/UL — SIGNIFICANT CHANGE UP (ref 0–0.5)
EOSINOPHIL # BLD AUTO: 0.11 K/UL — SIGNIFICANT CHANGE UP (ref 0–0.5)
EOSINOPHIL NFR BLD AUTO: 1.3 % — SIGNIFICANT CHANGE UP (ref 0–6)
EOSINOPHIL NFR BLD AUTO: 1.3 % — SIGNIFICANT CHANGE UP (ref 0–6)
GLUCOSE BLDC GLUCOMTR-MCNC: 110 MG/DL — HIGH (ref 70–99)
GLUCOSE BLDC GLUCOMTR-MCNC: 110 MG/DL — HIGH (ref 70–99)
GLUCOSE BLDC GLUCOMTR-MCNC: 145 MG/DL — HIGH (ref 70–99)
GLUCOSE BLDC GLUCOMTR-MCNC: 145 MG/DL — HIGH (ref 70–99)
GLUCOSE BLDC GLUCOMTR-MCNC: 160 MG/DL — HIGH (ref 70–99)
GLUCOSE BLDC GLUCOMTR-MCNC: 160 MG/DL — HIGH (ref 70–99)
GLUCOSE BLDC GLUCOMTR-MCNC: 190 MG/DL — HIGH (ref 70–99)
GLUCOSE BLDC GLUCOMTR-MCNC: 190 MG/DL — HIGH (ref 70–99)
GLUCOSE SERPL-MCNC: 99 MG/DL — SIGNIFICANT CHANGE UP (ref 70–99)
GLUCOSE SERPL-MCNC: 99 MG/DL — SIGNIFICANT CHANGE UP (ref 70–99)
HCT VFR BLD CALC: 38 % — SIGNIFICANT CHANGE UP (ref 34.5–45)
HCT VFR BLD CALC: 38 % — SIGNIFICANT CHANGE UP (ref 34.5–45)
HGB BLD-MCNC: 11 G/DL — LOW (ref 11.5–15.5)
HGB BLD-MCNC: 11 G/DL — LOW (ref 11.5–15.5)
IMM GRANULOCYTES NFR BLD AUTO: 0.6 % — SIGNIFICANT CHANGE UP (ref 0–0.9)
IMM GRANULOCYTES NFR BLD AUTO: 0.6 % — SIGNIFICANT CHANGE UP (ref 0–0.9)
LYMPHOCYTES # BLD AUTO: 3.13 K/UL — SIGNIFICANT CHANGE UP (ref 1–3.3)
LYMPHOCYTES # BLD AUTO: 3.13 K/UL — SIGNIFICANT CHANGE UP (ref 1–3.3)
LYMPHOCYTES # BLD AUTO: 37.2 % — SIGNIFICANT CHANGE UP (ref 13–44)
LYMPHOCYTES # BLD AUTO: 37.2 % — SIGNIFICANT CHANGE UP (ref 13–44)
MAGNESIUM SERPL-MCNC: 1.7 MG/DL — SIGNIFICANT CHANGE UP (ref 1.6–2.6)
MAGNESIUM SERPL-MCNC: 1.7 MG/DL — SIGNIFICANT CHANGE UP (ref 1.6–2.6)
MCHC RBC-ENTMCNC: 23.7 PG — LOW (ref 27–34)
MCHC RBC-ENTMCNC: 23.7 PG — LOW (ref 27–34)
MCHC RBC-ENTMCNC: 28.9 GM/DL — LOW (ref 32–36)
MCHC RBC-ENTMCNC: 28.9 GM/DL — LOW (ref 32–36)
MCV RBC AUTO: 81.7 FL — SIGNIFICANT CHANGE UP (ref 80–100)
MCV RBC AUTO: 81.7 FL — SIGNIFICANT CHANGE UP (ref 80–100)
MONOCYTES # BLD AUTO: 0.55 K/UL — SIGNIFICANT CHANGE UP (ref 0–0.9)
MONOCYTES # BLD AUTO: 0.55 K/UL — SIGNIFICANT CHANGE UP (ref 0–0.9)
MONOCYTES NFR BLD AUTO: 6.5 % — SIGNIFICANT CHANGE UP (ref 2–14)
MONOCYTES NFR BLD AUTO: 6.5 % — SIGNIFICANT CHANGE UP (ref 2–14)
NEUTROPHILS # BLD AUTO: 4.55 K/UL — SIGNIFICANT CHANGE UP (ref 1.8–7.4)
NEUTROPHILS # BLD AUTO: 4.55 K/UL — SIGNIFICANT CHANGE UP (ref 1.8–7.4)
NEUTROPHILS NFR BLD AUTO: 54.2 % — SIGNIFICANT CHANGE UP (ref 43–77)
NEUTROPHILS NFR BLD AUTO: 54.2 % — SIGNIFICANT CHANGE UP (ref 43–77)
NRBC # BLD: 0 /100 WBCS — SIGNIFICANT CHANGE UP (ref 0–0)
NRBC # BLD: 0 /100 WBCS — SIGNIFICANT CHANGE UP (ref 0–0)
PHOSPHATE SERPL-MCNC: 4 MG/DL — SIGNIFICANT CHANGE UP (ref 2.5–4.5)
PHOSPHATE SERPL-MCNC: 4 MG/DL — SIGNIFICANT CHANGE UP (ref 2.5–4.5)
PLATELET # BLD AUTO: 376 K/UL — SIGNIFICANT CHANGE UP (ref 150–400)
PLATELET # BLD AUTO: 376 K/UL — SIGNIFICANT CHANGE UP (ref 150–400)
POTASSIUM SERPL-MCNC: 4.2 MMOL/L — SIGNIFICANT CHANGE UP (ref 3.5–5.3)
POTASSIUM SERPL-MCNC: 4.2 MMOL/L — SIGNIFICANT CHANGE UP (ref 3.5–5.3)
POTASSIUM SERPL-SCNC: 4.2 MMOL/L — SIGNIFICANT CHANGE UP (ref 3.5–5.3)
POTASSIUM SERPL-SCNC: 4.2 MMOL/L — SIGNIFICANT CHANGE UP (ref 3.5–5.3)
PROT SERPL-MCNC: 5.7 G/DL — LOW (ref 6–8.3)
PROT SERPL-MCNC: 5.7 G/DL — LOW (ref 6–8.3)
RBC # BLD: 4.65 M/UL — SIGNIFICANT CHANGE UP (ref 3.8–5.2)
RBC # BLD: 4.65 M/UL — SIGNIFICANT CHANGE UP (ref 3.8–5.2)
RBC # FLD: 17.1 % — HIGH (ref 10.3–14.5)
RBC # FLD: 17.1 % — HIGH (ref 10.3–14.5)
SODIUM SERPL-SCNC: 133 MMOL/L — LOW (ref 135–145)
SODIUM SERPL-SCNC: 133 MMOL/L — LOW (ref 135–145)
WBC # BLD: 8.41 K/UL — SIGNIFICANT CHANGE UP (ref 3.8–10.5)
WBC # BLD: 8.41 K/UL — SIGNIFICANT CHANGE UP (ref 3.8–10.5)
WBC # FLD AUTO: 8.41 K/UL — SIGNIFICANT CHANGE UP (ref 3.8–10.5)
WBC # FLD AUTO: 8.41 K/UL — SIGNIFICANT CHANGE UP (ref 3.8–10.5)

## 2024-01-03 PROCEDURE — 93010 ELECTROCARDIOGRAM REPORT: CPT

## 2024-01-03 PROCEDURE — 99233 SBSQ HOSP IP/OBS HIGH 50: CPT | Mod: GC

## 2024-01-03 PROCEDURE — 99232 SBSQ HOSP IP/OBS MODERATE 35: CPT

## 2024-01-03 RX ORDER — NYSTATIN CREAM 100000 [USP'U]/G
1 CREAM TOPICAL
Qty: 0 | Refills: 0 | DISCHARGE
Start: 2024-01-03

## 2024-01-03 RX ORDER — ONDANSETRON 8 MG/1
4 TABLET, FILM COATED ORAL ONCE
Refills: 0 | Status: COMPLETED | OUTPATIENT
Start: 2024-01-03 | End: 2024-01-03

## 2024-01-03 RX ORDER — FLUCONAZOLE 150 MG/1
1 TABLET ORAL
Qty: 0 | Refills: 0 | DISCHARGE
Start: 2024-01-03

## 2024-01-03 RX ORDER — NYSTATIN CREAM 100000 [USP'U]/G
1 CREAM TOPICAL
Refills: 0 | Status: DISCONTINUED | OUTPATIENT
Start: 2024-01-03 | End: 2024-01-05

## 2024-01-03 RX ORDER — OXYCODONE HYDROCHLORIDE 5 MG/1
5 TABLET ORAL ONCE
Refills: 0 | Status: DISCONTINUED | OUTPATIENT
Start: 2024-01-03 | End: 2024-01-03

## 2024-01-03 RX ADMIN — OXYCODONE HYDROCHLORIDE 10 MILLIGRAM(S): 5 TABLET ORAL at 10:00

## 2024-01-03 RX ADMIN — Medication 3: at 17:40

## 2024-01-03 RX ADMIN — OXYCODONE HYDROCHLORIDE 10 MILLIGRAM(S): 5 TABLET ORAL at 17:10

## 2024-01-03 RX ADMIN — NYSTATIN CREAM 1 APPLICATION(S): 100000 CREAM TOPICAL at 17:09

## 2024-01-03 RX ADMIN — HUMAN INSULIN 72 UNIT(S): 100 INJECTION, SUSPENSION SUBCUTANEOUS at 09:32

## 2024-01-03 RX ADMIN — ONDANSETRON 4 MILLIGRAM(S): 8 TABLET, FILM COATED ORAL at 09:26

## 2024-01-03 RX ADMIN — CLOPIDOGREL BISULFATE 75 MILLIGRAM(S): 75 TABLET, FILM COATED ORAL at 09:26

## 2024-01-03 RX ADMIN — APIXABAN 5 MILLIGRAM(S): 2.5 TABLET, FILM COATED ORAL at 06:44

## 2024-01-03 RX ADMIN — Medication 3: at 13:26

## 2024-01-03 RX ADMIN — Medication 40 UNIT(S): at 13:26

## 2024-01-03 RX ADMIN — HUMAN INSULIN 72 UNIT(S): 100 INJECTION, SUSPENSION SUBCUTANEOUS at 17:40

## 2024-01-03 RX ADMIN — HUMAN INSULIN 72 UNIT(S): 100 INJECTION, SUSPENSION SUBCUTANEOUS at 00:16

## 2024-01-03 RX ADMIN — OXYCODONE HYDROCHLORIDE 10 MILLIGRAM(S): 5 TABLET ORAL at 21:44

## 2024-01-03 RX ADMIN — Medication 40 UNIT(S): at 09:31

## 2024-01-03 RX ADMIN — APIXABAN 5 MILLIGRAM(S): 2.5 TABLET, FILM COATED ORAL at 17:08

## 2024-01-03 RX ADMIN — Medication 40 UNIT(S): at 17:39

## 2024-01-03 RX ADMIN — PANTOPRAZOLE SODIUM 40 MILLIGRAM(S): 20 TABLET, DELAYED RELEASE ORAL at 09:26

## 2024-01-03 RX ADMIN — OXYCODONE HYDROCHLORIDE 10 MILLIGRAM(S): 5 TABLET ORAL at 16:10

## 2024-01-03 RX ADMIN — LINEZOLID 300 MILLIGRAM(S): 600 INJECTION, SOLUTION INTRAVENOUS at 11:45

## 2024-01-03 RX ADMIN — ATORVASTATIN CALCIUM 80 MILLIGRAM(S): 80 TABLET, FILM COATED ORAL at 21:44

## 2024-01-03 RX ADMIN — OXYCODONE HYDROCHLORIDE 10 MILLIGRAM(S): 5 TABLET ORAL at 11:00

## 2024-01-03 RX ADMIN — OXYCODONE HYDROCHLORIDE 5 MILLIGRAM(S): 5 TABLET ORAL at 19:24

## 2024-01-03 NOTE — PROGRESS NOTE ADULT - ASSESSMENT
44yo F PMH Asthma, CVA (11/2021; residual L>R weakness), PE (4/2023 on Eliquis), uncontrolled T2DM, HTN, HLD, h/o abdominal necrotizing fasciitis (s/p ostomy placement 11/2021 w/ intubation 11-12/2021), multiple abdominal hernia repairs, frequent UTIs, CAD s/p PCI presented 12/31 w/ worsening groin pain and redness, found to have glucose >600 w/ AG, admitted to MICU for insulin gtt, now hyperglycemia resolved and on IV abx for groin infection stepped down to [facility] for further management  44yo F PMH Asthma, CVA (11/2021; residual L>R weakness), PE (4/2023 on Eliquis), uncontrolled T2DM, HTN, HLD, h/o abdominal necrotizing fasciitis (s/p ostomy placement 11/2021 w/ intubation 11-12/2021), multiple abdominal hernia repairs, frequent UTIs, CAD s/p PCI presented 12/31 w/ worsening groin pain and redness, found to have glucose >600 w/ AG, admitted to MICU for insulin gtt, now hyperglycemia resolved and on IV abx for groin infection stepped down to [facility] for further management

## 2024-01-03 NOTE — PROGRESS NOTE ADULT - PROBLEM SELECTOR PLAN 1
Type 2 diabetes mellitus with hyperglycemia / Steroid-induced hyperglycemia  - Please Increase NPH insulin  units every 8 hours (8AM, 4PM, 12AM).  - INCREASE lispro to  units three times daily before meals.   - Continue customized order for Lispro insulin correctional scale as follows (FSG = Fingerstick glucose) to be given before meals:       > -200, give 3 units      > -250, give 6 units      > -300, give 9 units      > -350, give 12 units      > -400, give 15 units      > -450, give 18 units      >  and above, give 21 units   - Patient's fingerstick glucose goal is 100-180 mg/dL.    - Discharge recommendations to be discussed. Ozmepic 0.25 mg approved thru 06/2024 # [id number]. Dexcom covered with $0 copay; will educate and place closer to discharge. Will consider restarting metformin at discharge as well.  - Patient can follow up at discharge with Dr. Joseph at [facility] by calling [phone] to make an appointment. Clinic will contact patient for appt.    Case discussed with Dr. Rose. Primary team updated. Type 2 diabetes mellitus with hyperglycemia / Steroid-induced hyperglycemia  - Please Increase NPH insulin  units every 8 hours (8AM, 4PM, 12AM).  - INCREASE lispro to  units three times daily before meals.   - Continue customized order for Lispro insulin correctional scale as follows (FSG = Fingerstick glucose) to be given before meals:       > -200, give 3 units      > -250, give 6 units      > -300, give 9 units      > -350, give 12 units      > -400, give 15 units      > -450, give 18 units      >  and above, give 21 units   - Patient's fingerstick glucose goal is 100-180 mg/dL.    - Discharge recommendations to be discussed. Ozmepic 0.25 mg approved thru 06/2024 # [id number]. Dexcom covered with $0 copay; will educate and place closer to discharge. Will consider restarting metformin at discharge as well.  - Patient can follow up at discharge with Dr. Joseph at [facility] by calling [phone] to make an appointment. Clinic will contact patient for appt.    Case discussed with Dr. Rose. Primary team updated. Type 2 diabetes mellitus with hyperglycemia / Steroid-induced hyperglycemia  - Please continue NPH insulin 72 units every 8 hours (8AM, 4PM, 12AM).  - continue lispro 40 units three times daily before meals.   - Continue customized order for Lispro insulin correctional scale as follows (FSG = Fingerstick glucose) to be given before meals:       > -200, give 3 units      > -250, give 6 units      > -300, give 9 units      > -350, give 12 units      > -400, give 15 units      > -450, give 18 units      >  and above, give 21 units   - Patient's fingerstick glucose goal is 100-180 mg/dL.    - Discharge recommendations to be discussed. Ozmepic 0.25 mg approved thru 06/2024 # [id number]. Dexcom covered with $0 copay; will educate and place closer to discharge. Will consider restarting metformin at discharge as well.  - Patient can follow up at discharge with Dr. Joseph at [facility] by calling [phone] to make an appointment. Clinic will contact patient for appt.    Case discussed with Dr. Rose. Primary team updated. Type 2 diabetes mellitus with hyperglycemia / Steroid-induced hyperglycemia  - Please continue NPH insulin 72 units every 8 hours (8AM, 4PM, 12AM).  - continue lispro 40 units three times daily before meals.   - Continue customized order for Lispro insulin correctional scale as follows (FSG = Fingerstick glucose) to be given before meals:       > -200, give 3 units      > -250, give 6 units      > -300, give 9 units      > -350, give 12 units      > -400, give 15 units      > -450, give 18 units      >  and above, give 21 units   - Patient's fingerstick glucose goal is 100-180 mg/dL.    - Discharge recommendations to be discussed. Ozmepic 0.25 mg approved thru 06/2024 # [id number]. Dexcom covered with $0 copay; will educate and place closer to discharge. Will consider restarting metformin at discharge as well.  - Patient can follow up at discharge with Dr. Joseph at [facility] by calling [phone] to make an appointment. Clinic will contact patient for appt.    Case discussed with Dr. Rose. Primary team updated.

## 2024-01-03 NOTE — PHYSICAL THERAPY INITIAL EVALUATION ADULT - PERTINENT HX OF CURRENT PROBLEM, REHAB EVAL
42yo F PMH Asthma, CVA (11/2021; residual L>R weakness), PE (4/2023 on Eliquis), uncontrolled T2DM, HTN, HLD, h/o abdominal necrotizing fasciitis (s/p ostomy placement 11/2021 w/ intubation 11-12/2021), multiple abdominal hernia repairs, frequent UTIs, CAD s/p PCI presented 12/31 w/ worsening groin pain and redness, found to have glucose >600 w/ AG, admitted to MICU for insulin gtt, now hyperglycemia resolved and on IV abx for groin infection and stepped down to [facility].

## 2024-01-03 NOTE — ADVANCED PRACTICE NURSE CONSULT - REASON FOR CONSULT
intertrigo    per chart review, 42yo F PMH Asthma, CVA (11/2021; residual L>R weakness), PE (4/2023 on Eliquis), uncontrolled T2DM, HTN, HLD, h/o abdominal necrotizing fasciitis (s/p ostomy placement 11/2021 w/ intubation 11-12/2021), multiple abdominal hernia repairs, frequent UTIs, CAD s/p PCI presented 12/31 w/ worsening groin pain and redness, found to have glucose >600 w/ AG, admitted to MICU for insulin gtt, now hyperglycemia resolved and on IV abx for groin infection and stepped down to [facility]. intertrigo    per chart review, 44yo F PMH Asthma, CVA (11/2021; residual L>R weakness), PE (4/2023 on Eliquis), uncontrolled T2DM, HTN, HLD, h/o abdominal necrotizing fasciitis (s/p ostomy placement 11/2021 w/ intubation 11-12/2021), multiple abdominal hernia repairs, frequent UTIs, CAD s/p PCI presented 12/31 w/ worsening groin pain and redness, found to have glucose >600 w/ AG, admitted to MICU for insulin gtt, now hyperglycemia resolved and on IV abx for groin infection and stepped down to [facility].

## 2024-01-03 NOTE — PROGRESS NOTE ADULT - SUBJECTIVE AND OBJECTIVE BOX
**INCOMPLETE NOTE    OVERNIGHT EVENTS:    SUBJECTIVE:  Patient seen and examined at bedside.    Vital Signs Last 12 Hrs  T(F): 97.9 (01-03-24 @ 06:07), Max: 98.3 (01-02-24 @ 21:15)  HR: 80 (01-03-24 @ 06:07) (80 - 86)  BP: 95/62 (01-03-24 @ 06:07) (95/62 - 107/69)  BP(mean): 82 (01-02-24 @ 21:15) (82 - 82)  RR: 18 (01-03-24 @ 06:07) (18 - 20)  SpO2: 100% (01-03-24 @ 06:07) (99% - 100%)  I&O's Summary    02 Jan 2024 07:01  -  03 Jan 2024 07:00  --------------------------------------------------------  IN: 610 mL / OUT: 650 mL / NET: -40 mL        PHYSICAL EXAM:    Constitutional: WDWN resting comfortably in bed; NAD  Head: NC/AT  Eyes: PERRL, EOMI, anicteric sclera  ENT: no nasal discharge; uvula midline, no oropharyngeal erythema or exudates; MMM  Neck: supple; no JVD or thyromegaly  Respiratory: CTA B/L; no W/R/R, no retractions  Cardiac: +S1/S2; RRR; no M/R/G; PMI non-displaced  Gastrointestinal: abdomen soft, NT/ND; no rebound or guarding; +BSx4  Extremities: WWP, no clubbing or cyanosis; no peripheral edema  Musculoskeletal: NROM x4; no joint swelling, tenderness or erythema  Vascular: 2+ radial, DP/PT pulses B/L  Lymphatic: no submandibular or cervical LAD  Neurologic: AAOx3  Psychiatric: affect and characteristics of appearance, verbalizations, behaviors are appropriate    LABS:                        12.0   10.28 )-----------( 382      ( 02 Jan 2024 05:30 )             40.2     01-02    132<L>  |  104  |  16  ----------------------------<  330<H>  4.5   |  19<L>  |  0.82    Ca    8.6      02 Jan 2024 05:30  Phos  2.3     01-02  Mg     1.9     01-02    TPro  6.7  /  Alb  2.8<L>  /  TBili  <0.2  /  DBili  x   /  AST  31  /  ALT  22  /  AlkPhos  167<H>  01-02      Urinalysis Basic - ( 02 Jan 2024 05:30 )    Color: x / Appearance: x / SG: x / pH: x  Gluc: 330 mg/dL / Ketone: x  / Bili: x / Urobili: x   Blood: x / Protein: x / Nitrite: x   Leuk Esterase: x / RBC: x / WBC x   Sq Epi: x / Non Sq Epi: x / Bacteria: x          RADIOLOGY & ADDITIONAL TESTS:    MEDICATIONS  (STANDING):  apixaban 5 milliGRAM(s) Oral every 12 hours  atorvastatin 80 milliGRAM(s) Oral at bedtime  chlorhexidine 2% Cloths 1 Application(s) Topical <User Schedule>  clopidogrel Tablet 75 milliGRAM(s) Oral daily  clotrimazole 1% Cream 1 Application(s) Topical two times a day  dextrose 5%. 1000 milliLiter(s) (50 mL/Hr) IV Continuous <Continuous>  dextrose 5%. 1000 milliLiter(s) (100 mL/Hr) IV Continuous <Continuous>  dextrose 50% Injectable 25 Gram(s) IV Push once  dextrose 50% Injectable 12.5 Gram(s) IV Push once  dextrose 50% Injectable 25 Gram(s) IV Push once  ertapenem  IVPB 1000 milliGRAM(s) IV Intermittent every 24 hours  fluconAZOLE   Tablet 150 milliGRAM(s) Oral <User Schedule>  glucagon  Injectable 1 milliGRAM(s) IntraMuscular once  insulin lispro (ADMELOG) corrective regimen sliding scale   SubCutaneous Before meals and at bedtime  insulin lispro Injectable (ADMELOG) 40 Unit(s) SubCutaneous three times a day before meals  insulin NPH human recombinant 72 Unit(s) SubCutaneous <User Schedule>  linezolid  IVPB      linezolid  IVPB 600 milliGRAM(s) IV Intermittent every 12 hours  metoprolol succinate ER 50 milliGRAM(s) Oral daily  pantoprazole    Tablet 40 milliGRAM(s) Oral every 24 hours    MEDICATIONS  (PRN):  dextrose Oral Gel 15 Gram(s) Oral once PRN Blood Glucose LESS THAN 70 milliGRAM(s)/deciliter  oxyCODONE    IR 10 milliGRAM(s) Oral every 4 hours PRN Severe Pain (7 - 10)     OVERNIGHT EVENTS: rubén    SUBJECTIVE:  Patient seen and examined at bedside. Pt states that she has a headaches and abdominal pain. however the pain is better than yesterday. Pt states that she is nauseous, no cp, sob, v/d.      Vital Signs Last 12 Hrs  T(F): 97.9 (01-03-24 @ 06:07), Max: 98.3 (01-02-24 @ 21:15)  HR: 80 (01-03-24 @ 06:07) (80 - 86)  BP: 95/62 (01-03-24 @ 06:07) (95/62 - 107/69)  BP(mean): 82 (01-02-24 @ 21:15) (82 - 82)  RR: 18 (01-03-24 @ 06:07) (18 - 20)  SpO2: 100% (01-03-24 @ 06:07) (99% - 100%)  I&O's Summary    02 Jan 2024 07:01  -  03 Jan 2024 07:00  --------------------------------------------------------  IN: 610 mL / OUT: 650 mL / NET: -40 mL          PHYSICAL EXAM:  Constitutional: obese adult female lying in bed NAD  HEENT: NC/AT, EOMI, MMM  Neck: Supple  Respiratory: CTAB but difficult to auscultate 2/2 body habitus  Cardiovascular: RRR, normal S1 and S2, no murmurs appreciated  Gastrointestinal: +BS, tender to palpation around colostomy site which is c/d/i and without erythema or drainage  Extremities: WWP, no peripheral edema  Vascular: 2+ radial and DP pulses b/l  Neurological: AAOx3, moving all extremities spontaneously but with discomfort  Skin: erythema and dry flaky skin in groin, R worse than L, and area is TTP; patient not able to turn over but reports erythema and skin flaking extending to backside as well    LABS:                        12.0   10.28 )-----------( 382      ( 02 Jan 2024 05:30 )             40.2     01-02    132<L>  |  104  |  16  ----------------------------<  330<H>  4.5   |  19<L>  |  0.82    Ca    8.6      02 Jan 2024 05:30  Phos  2.3     01-02  Mg     1.9     01-02    TPro  6.7  /  Alb  2.8<L>  /  TBili  <0.2  /  DBili  x   /  AST  31  /  ALT  22  /  AlkPhos  167<H>  01-02      Urinalysis Basic - ( 02 Jan 2024 05:30 )    Color: x / Appearance: x / SG: x / pH: x  Gluc: 330 mg/dL / Ketone: x  / Bili: x / Urobili: x   Blood: x / Protein: x / Nitrite: x   Leuk Esterase: x / RBC: x / WBC x   Sq Epi: x / Non Sq Epi: x / Bacteria: x          RADIOLOGY & ADDITIONAL TESTS:    MEDICATIONS  (STANDING):  apixaban 5 milliGRAM(s) Oral every 12 hours  atorvastatin 80 milliGRAM(s) Oral at bedtime  chlorhexidine 2% Cloths 1 Application(s) Topical <User Schedule>  clopidogrel Tablet 75 milliGRAM(s) Oral daily  clotrimazole 1% Cream 1 Application(s) Topical two times a day  dextrose 5%. 1000 milliLiter(s) (50 mL/Hr) IV Continuous <Continuous>  dextrose 5%. 1000 milliLiter(s) (100 mL/Hr) IV Continuous <Continuous>  dextrose 50% Injectable 25 Gram(s) IV Push once  dextrose 50% Injectable 12.5 Gram(s) IV Push once  dextrose 50% Injectable 25 Gram(s) IV Push once  ertapenem  IVPB 1000 milliGRAM(s) IV Intermittent every 24 hours  fluconAZOLE   Tablet 150 milliGRAM(s) Oral <User Schedule>  glucagon  Injectable 1 milliGRAM(s) IntraMuscular once  insulin lispro (ADMELOG) corrective regimen sliding scale   SubCutaneous Before meals and at bedtime  insulin lispro Injectable (ADMELOG) 40 Unit(s) SubCutaneous three times a day before meals  insulin NPH human recombinant 72 Unit(s) SubCutaneous <User Schedule>  linezolid  IVPB      linezolid  IVPB 600 milliGRAM(s) IV Intermittent every 12 hours  metoprolol succinate ER 50 milliGRAM(s) Oral daily  pantoprazole    Tablet 40 milliGRAM(s) Oral every 24 hours    MEDICATIONS  (PRN):  dextrose Oral Gel 15 Gram(s) Oral once PRN Blood Glucose LESS THAN 70 milliGRAM(s)/deciliter  oxyCODONE    IR 10 milliGRAM(s) Oral every 4 hours PRN Severe Pain (7 - 10)     OVERNIGHT EVENTS: rubén    SUBJECTIVE:  Patient seen and examined at bedside. Pt states that she has a headaches and abdominal pain. however the pain is better than yesterday. Pt states that she is nauseous, no cp, sob, v/d.      Vital Signs Last 12 Hrs  T(F): 97.9 (01-03-24 @ 06:07), Max: 98.3 (01-02-24 @ 21:15)  HR: 80 (01-03-24 @ 06:07) (80 - 86)  BP: 95/62 (01-03-24 @ 06:07) (95/62 - 107/69)  BP(mean): 82 (01-02-24 @ 21:15) (82 - 82)  RR: 18 (01-03-24 @ 06:07) (18 - 20)  SpO2: 100% (01-03-24 @ 06:07) (99% - 100%)  I&O's Summary    02 Jan 2024 07:01  -  03 Jan 2024 07:00  --------------------------------------------------------  IN: 610 mL / OUT: 650 mL / NET: -40 mL          PHYSICAL EXAM:  Constitutional: obese adult female lying in bed NAD  HEENT: NC/AT, EOMI, MMM  Neck: Supple  Respiratory: CTAB but difficult to auscultate 2/2 body habitus  Cardiovascular: RRR, normal S1 and S2, no murmurs appreciated  Gastrointestinal: +BS, tender to palpation around colostomy site which is c/d/i and without erythema or drainage  Extremities: WWP, no peripheral edema  Vascular: 2+ radial and DP pulses b/l  Neurological: AAOx3, moving all extremities spontaneously but with discomfort  Skin: erythema and dry flaky skin in groin, TTP; patient reports erythema and skin flaking    LABS:                        12.0   10.28 )-----------( 382      ( 02 Jan 2024 05:30 )             40.2     01-02    132<L>  |  104  |  16  ----------------------------<  330<H>  4.5   |  19<L>  |  0.82    Ca    8.6      02 Jan 2024 05:30  Phos  2.3     01-02  Mg     1.9     01-02    TPro  6.7  /  Alb  2.8<L>  /  TBili  <0.2  /  DBili  x   /  AST  31  /  ALT  22  /  AlkPhos  167<H>  01-02      Urinalysis Basic - ( 02 Jan 2024 05:30 )    Color: x / Appearance: x / SG: x / pH: x  Gluc: 330 mg/dL / Ketone: x  / Bili: x / Urobili: x   Blood: x / Protein: x / Nitrite: x   Leuk Esterase: x / RBC: x / WBC x   Sq Epi: x / Non Sq Epi: x / Bacteria: x          RADIOLOGY & ADDITIONAL TESTS:    MEDICATIONS  (STANDING):  apixaban 5 milliGRAM(s) Oral every 12 hours  atorvastatin 80 milliGRAM(s) Oral at bedtime  chlorhexidine 2% Cloths 1 Application(s) Topical <User Schedule>  clopidogrel Tablet 75 milliGRAM(s) Oral daily  clotrimazole 1% Cream 1 Application(s) Topical two times a day  dextrose 5%. 1000 milliLiter(s) (50 mL/Hr) IV Continuous <Continuous>  dextrose 5%. 1000 milliLiter(s) (100 mL/Hr) IV Continuous <Continuous>  dextrose 50% Injectable 25 Gram(s) IV Push once  dextrose 50% Injectable 12.5 Gram(s) IV Push once  dextrose 50% Injectable 25 Gram(s) IV Push once  ertapenem  IVPB 1000 milliGRAM(s) IV Intermittent every 24 hours  fluconAZOLE   Tablet 150 milliGRAM(s) Oral <User Schedule>  glucagon  Injectable 1 milliGRAM(s) IntraMuscular once  insulin lispro (ADMELOG) corrective regimen sliding scale   SubCutaneous Before meals and at bedtime  insulin lispro Injectable (ADMELOG) 40 Unit(s) SubCutaneous three times a day before meals  insulin NPH human recombinant 72 Unit(s) SubCutaneous <User Schedule>  linezolid  IVPB      linezolid  IVPB 600 milliGRAM(s) IV Intermittent every 12 hours  metoprolol succinate ER 50 milliGRAM(s) Oral daily  pantoprazole    Tablet 40 milliGRAM(s) Oral every 24 hours    MEDICATIONS  (PRN):  dextrose Oral Gel 15 Gram(s) Oral once PRN Blood Glucose LESS THAN 70 milliGRAM(s)/deciliter  oxyCODONE    IR 10 milliGRAM(s) Oral every 4 hours PRN Severe Pain (7 - 10)

## 2024-01-03 NOTE — PROGRESS NOTE ADULT - SUBJECTIVE AND OBJECTIVE BOX
INFECTIOUS DISEASES CONSULT FOLLOW-UP NOTE    INTERVAL HPI/OVERNIGHT EVENTS:  Afebrile, vitals normal, WBC normal  Ongoing groin discomfort    ROS:   Constitutional, eyes, ENT, cardiovascular, respiratory, gastrointestinal, genitourinary, integumentary, neurological, psychiatric and heme/lymph are otherwise negative other than noted above       ANTIBIOTICS/RELEVANT:    MEDICATIONS  (STANDING):  apixaban 5 milliGRAM(s) Oral every 12 hours  atorvastatin 80 milliGRAM(s) Oral at bedtime  chlorhexidine 2% Cloths 1 Application(s) Topical <User Schedule>  clopidogrel Tablet 75 milliGRAM(s) Oral daily  dextrose 5%. 1000 milliLiter(s) (50 mL/Hr) IV Continuous <Continuous>  dextrose 5%. 1000 milliLiter(s) (100 mL/Hr) IV Continuous <Continuous>  dextrose 50% Injectable 25 Gram(s) IV Push once  dextrose 50% Injectable 25 Gram(s) IV Push once  dextrose 50% Injectable 12.5 Gram(s) IV Push once  ertapenem  IVPB 1000 milliGRAM(s) IV Intermittent every 24 hours  fluconAZOLE   Tablet 150 milliGRAM(s) Oral <User Schedule>  glucagon  Injectable 1 milliGRAM(s) IntraMuscular once  insulin lispro (ADMELOG) corrective regimen sliding scale   SubCutaneous Before meals and at bedtime  insulin lispro Injectable (ADMELOG) 40 Unit(s) SubCutaneous three times a day before meals  insulin NPH human recombinant 72 Unit(s) SubCutaneous <User Schedule>  linezolid  IVPB 600 milliGRAM(s) IV Intermittent every 12 hours  linezolid  IVPB      metoprolol succinate ER 50 milliGRAM(s) Oral daily  nystatin Powder 1 Application(s) Topical two times a day  pantoprazole    Tablet 40 milliGRAM(s) Oral every 24 hours    MEDICATIONS  (PRN):  dextrose Oral Gel 15 Gram(s) Oral once PRN Blood Glucose LESS THAN 70 milliGRAM(s)/deciliter  oxyCODONE    IR 10 milliGRAM(s) Oral every 4 hours PRN Severe Pain (7 - 10)        Vital Signs Last 24 Hrs  T(C): 36.8 (03 Jan 2024 13:26), Max: 37.2 (02 Jan 2024 19:00)  T(F): 98.2 (03 Jan 2024 13:26), Max: 98.9 (02 Jan 2024 19:00)  HR: 85 (03 Jan 2024 13:26) (80 - 92)  BP: 114/64 (03 Jan 2024 13:26) (95/62 - 142/65)  BP(mean): 82 (02 Jan 2024 21:15) (82 - 93)  RR: 19 (03 Jan 2024 13:26) (18 - 28)  SpO2: 98% (03 Jan 2024 13:26) (98% - 100%)    Parameters below as of 03 Jan 2024 13:26  Patient On (Oxygen Delivery Method): room air        01-02-24 @ 07:01  -  01-03-24 @ 07:00  --------------------------------------------------------  IN: 610 mL / OUT: 650 mL / NET: -40 mL      PHYSICAL EXAM:  Constitutional: alert, NAD  Eyes: the sclera and conjunctiva were normal.   ENT: the ears and nose were normal in appearance.   Mouth: Poor dentition  Neck: the appearance of the neck was normal and the neck was supple.   Pulmonary: no respiratory distress and lungs were clear to auscultation bilaterally.   Heart: heart rate was normal and rhythm regular, normal S1 and S2, no murmur  Abdomen: Ostomy w/ small volume liquid stool  MSK: No joint swelling/pain. No midline spine tenderness  Skin: Improving redness with satellite lesions involving bilateral inguinal folds, inner thighs, and underneath pannus. Some skin exfoliation in the perineum, without swelling or dusky skin changes. Patient declines palpation exam.          LABS:                        11.0   8.41  )-----------( 376      ( 03 Jan 2024 05:30 )             38.0     01-03    133<L>  |  106  |  15  ----------------------------<  99  4.2   |  20<L>  |  0.85    Ca    8.3<L>      03 Jan 2024 05:30  Phos  4.0     01-03  Mg     1.7     01-03    TPro  5.7<L>  /  Alb  2.3<L>  /  TBili  <0.2  /  DBili  x   /  AST  25  /  ALT  14  /  AlkPhos  138<H>  01-03      Urinalysis Basic - ( 03 Jan 2024 05:30 )    Color: x / Appearance: x / SG: x / pH: x  Gluc: 99 mg/dL / Ketone: x  / Bili: x / Urobili: x   Blood: x / Protein: x / Nitrite: x   Leuk Esterase: x / RBC: x / WBC x   Sq Epi: x / Non Sq Epi: x / Bacteria: x        MICROBIOLOGY:  Reviewed    RADIOLOGY & ADDITIONAL STUDIES:  Reviewed

## 2024-01-03 NOTE — PROGRESS NOTE ADULT - SUBJECTIVE AND OBJECTIVE BOX
SUBJECTIVE / INTERVAL HPI: Patient was seen and examined this morning. Glucose now at target. Eating usual hospital meals. Denies eating anything overnight. Dexcom brought to bedside; her phone is not compatible. Will educate and place sensor closer to discharge to ensure no more imaging is ordered. Discussed at length the importance of glucose control in preventing skin breakdown and infection in perianal area. Discussed role glucosuria with incontinence in relation to skin breakdown and infection.    CAPILLARY BLOOD GLUCOSE & INSULIN RECEIVED  309 mg/dL (01-02 @ 07:27) - NPH 65 + lispro 35+12  184 mg/dL (01-02 @ 09:18)  237 mg/dL (01-02 @ 11:32) - Lispro 35+6  140 mg/dL (01-02 @ 16:05) - NPH 65 + lispro 35. Ate chicken, rice, and green beans  116 mg/dL (01-02 @ 22:15) - NPH 72  99 mg/dL (01-03 @ 05:30)  110 mg/dL (01-03 @ 09:29) - NPH 72 + lispro 40. Ate eggs and banana. Yogurt parfait at bedside but hadn't eaten yet.  190 mg/dL (01-03 @ 13:19)      REVIEW OF SYSTEMS  Constitutional:  Negative fever, chills or loss of appetite.  Cardiovascular:  Negative for chest pain or palpitations.  Respiratory:  Negative for cough, wheezing, or shortness of breath.   Gastrointestinal:  Negative for nausea, vomiting, diarrhea, constipation, or abdominal pain.  Genitourinary:  (+) erythema, burning and pruritus of vulva. Negative frequency, urgency or dysuria.  Neurologic:  No headache, confusion, dizziness, lightheadedness.    PHYSICAL EXAM  Vital Signs Last 24 Hrs  T(C): 36.8 (03 Jan 2024 13:26), Max: 37.2 (02 Jan 2024 19:00)  T(F): 98.2 (03 Jan 2024 13:26), Max: 98.9 (02 Jan 2024 19:00)  HR: 85 (03 Jan 2024 13:26) (80 - 92)  BP: 114/64 (03 Jan 2024 13:26) (95/62 - 142/65)  BP(mean): 82 (02 Jan 2024 21:15) (82 - 93)  RR: 19 (03 Jan 2024 13:26) (18 - 28)  SpO2: 98% (03 Jan 2024 13:26) (98% - 100%)    Parameters below as of 03 Jan 2024 13:26  Patient On (Oxygen Delivery Method): room air    Constitutional: Awake, alert, obese female, in no acute distress.   HEENT: Normocephalic, atraumatic, NAOMI.  Respiratory: Lungs clear to ausculation bilaterally.   Cardiovascular: regular rhythm, normal S1 and S2, no audible murmurs.   GI: soft, non-tender, non-distended, bowel sounds present. + ostomy  Extremities: No lower extremity edema.  Psychiatric: AAO x 3.       LABS  CBC - WBC/HGB/HTC/PLT: 8.41/11.0/38.0/376 (01-03-24)  BMP - Na/K/Cl/Bicarb/BUN/Cr/Gluc/AG/eGFR: 133/4.2/106/20/15/0.85/99/7/87 (01-03-24)  Ca - 8.3 (01-03-24)  Phos - 4.0 (01-03-24)  Mg - 1.7 (01-03-24)  LFT - Alb/Tprot/Tbili/Dbili/AlkPhos/ALT/AST: 2.3/--/<0.2/--/138/14/25 (01-03-24)  PT/aPTT/INR: 11.5/28.8/1.01 (12-31-23)   Thyroid Stimulating Hormone, Serum: 0.797 (12-31-23)      MEDICATIONS  MEDICATIONS  (STANDING):  apixaban 5 milliGRAM(s) Oral every 12 hours  atorvastatin 80 milliGRAM(s) Oral at bedtime  chlorhexidine 2% Cloths 1 Application(s) Topical <User Schedule>  clopidogrel Tablet 75 milliGRAM(s) Oral daily  dextrose 5%. 1000 milliLiter(s) (50 mL/Hr) IV Continuous <Continuous>  dextrose 5%. 1000 milliLiter(s) (100 mL/Hr) IV Continuous <Continuous>  dextrose 50% Injectable 25 Gram(s) IV Push once  dextrose 50% Injectable 12.5 Gram(s) IV Push once  dextrose 50% Injectable 25 Gram(s) IV Push once  ertapenem  IVPB 1000 milliGRAM(s) IV Intermittent every 24 hours  fluconAZOLE   Tablet 150 milliGRAM(s) Oral <User Schedule>  glucagon  Injectable 1 milliGRAM(s) IntraMuscular once  insulin lispro (ADMELOG) corrective regimen sliding scale   SubCutaneous Before meals and at bedtime  insulin lispro Injectable (ADMELOG) 40 Unit(s) SubCutaneous three times a day before meals  insulin NPH human recombinant 72 Unit(s) SubCutaneous <User Schedule>  linezolid  IVPB      linezolid  IVPB 600 milliGRAM(s) IV Intermittent every 12 hours  metoprolol succinate ER 50 milliGRAM(s) Oral daily  nystatin Powder 1 Application(s) Topical two times a day  pantoprazole    Tablet 40 milliGRAM(s) Oral every 24 hours    MEDICATIONS  (PRN):  dextrose Oral Gel 15 Gram(s) Oral once PRN Blood Glucose LESS THAN 70 milliGRAM(s)/deciliter  oxyCODONE    IR 10 milliGRAM(s) Oral every 4 hours PRN Severe Pain (7 - 10)

## 2024-01-03 NOTE — DISCHARGE NOTE PROVIDER - NSDCCPCAREPLAN_GEN_ALL_CORE_FT
PRINCIPAL DISCHARGE DIAGNOSIS  Diagnosis: Uncontrolled type 2 diabetes mellitus with hyperglycemia  Assessment and Plan of Treatment: You have a known history of diabetes mellitus prior to your admission. This condition results from blood sugar levels getting too high because your body is more resistant to insulin. Uncontrolled blood sugar levels can lead to kidney and heart damage, pain/numbness/paralysis in your hands and feet, and increased rates of infections.  To manage this you are on a medication. It is important that you continue to take this medication when you are discharged so that you can continue to control your blood sugar levels. Additionally be sure to follow up with your primary care physician, podiatrist, and ophthalmologist on a regular basis. Please follow up with Dr. Joseph at [facility] by calling [phone] to make an appointment.      SECONDARY DISCHARGE DIAGNOSES  Diagnosis: Acute UTI  Assessment and Plan of Treatment:     Diagnosis: Hyperglycemia  Assessment and Plan of Treatment:     Diagnosis: Intertrigo  Assessment and Plan of Treatment: Intertrigo is a common inflammatory skin condition that is caused by skin-to-skin friction (rubbing) that is intensified by heat and moisture. It usually looks like a reddish rash. Trapped moisture, which is usually due to sweating, causes the surfaces of your skin to stick together in your skin folds. The moisture increases the friction, which leads to skin damage and inflammation. In many cases of intertrigo, damage to the skin allows bacteria and/or fungus normally present on the surface of your skin to overgrow. The warmth, trapped moisture and friction-induced skin damage create an ideal environment for bacteria and fungi to grow and multiply. This overgrowth of bacteria and/or fungi triggers your immune system to respond, which results in secondary inflammation and a visible rash. In more severe cases, the bacterial and/or fungal overgrowth is significant enough to cause a secondary infection. Please continue using the Nystatin powder twice a dayand take fluconazole 150 mg pill once a week for 4 weeks once your leave the hospital.     PRINCIPAL DISCHARGE DIAGNOSIS  Diagnosis: Uncontrolled type 2 diabetes mellitus with hyperglycemia  Assessment and Plan of Treatment: You have a known history of diabetes mellitus prior to your admission. This condition results from blood sugar levels getting too high because your body is more resistant to insulin. Uncontrolled blood sugar levels can lead to kidney and heart damage, pain/numbness/paralysis in your hands and feet, and increased rates of infections.  To manage this you are on a medication. It is important that you continue to take this medication when you are discharged so that you can continue to control your blood sugar levels. Additionally be sure to follow up with your primary care physician, podiatrist, and ophthalmologist on a regular basis. Please follow up with Dr. Joseph at [facility] by calling [phone] to make an appointment.      SECONDARY DISCHARGE DIAGNOSES  Diagnosis: Acute UTI  Assessment and Plan of Treatment:     Diagnosis: Hyperglycemia  Assessment and Plan of Treatment:     Diagnosis: Intertrigo  Assessment and Plan of Treatment: Intertrigo is a common inflammatory skin condition that is caused by skin-to-skin friction (rubbing) that is intensified by heat and moisture. It usually looks like a reddish rash. Trapped moisture, which is usually due to sweating, causes the surfaces of your skin to stick together in your skin folds. The moisture increases the friction, which leads to skin damage and inflammation. In many cases of intertrigo, damage to the skin allows bacteria and/or fungus normally present on the surface of your skin to overgrow. The warmth, trapped moisture and friction-induced skin damage create an ideal environment for bacteria and fungi to grow and multiply. This overgrowth of bacteria and/or fungi triggers your immune system to respond, which results in secondary inflammation and a visible rash. In more severe cases, the bacterial and/or fungal overgrowth is significant enough to cause a secondary infection. Please continue using the Nystatin powder twice a dayand take fluconazole 150 mg pill once a week for 4 weeks once your leave the hospital.     PRINCIPAL DISCHARGE DIAGNOSIS  Diagnosis: Uncontrolled type 2 diabetes mellitus with hyperglycemia  Assessment and Plan of Treatment: You have a known history of diabetes mellitus prior to your admission. This condition results from blood sugar levels getting too high because your body is more resistant to insulin. Uncontrolled blood sugar levels can lead to kidney and heart damage, pain/numbness/paralysis in your hands and feet, and increased rates of infections.  To manage this you are on a medication. It is important that you continue to take this medication when you are discharged so that you can continue to control your blood sugar levels. Additionally be sure to follow up with your primary care physician, podiatrist, and ophthalmologist on a regular basis. Please follow up with Dr. Joseph at [facility] by calling [phone] to make an appointment.      SECONDARY DISCHARGE DIAGNOSES  Diagnosis: Intertrigo  Assessment and Plan of Treatment: Intertrigo is a common inflammatory skin condition that is caused by skin-to-skin friction (rubbing) that is intensified by heat and moisture. It usually looks like a reddish rash. Trapped moisture, which is usually due to sweating, causes the surfaces of your skin to stick together in your skin folds. The moisture increases the friction, which leads to skin damage and inflammation. In many cases of intertrigo, damage to the skin allows bacteria and/or fungus normally present on the surface of your skin to overgrow. The warmth, trapped moisture and friction-induced skin damage create an ideal environment for bacteria and fungi to grow and multiply. This overgrowth of bacteria and/or fungi triggers your immune system to respond, which results in secondary inflammation and a visible rash. In more severe cases, the bacterial and/or fungal overgrowth is significant enough to cause a secondary infection. Please return to hospital for any worsening local symptoms of pain, redness, swelling, or any systemic symptoms of fever/chills/sweats. Please continue using the Nystatin powder twice a dayand take fluconazole 150 mg pill once a week for 4 weeks once your leave the hospital.     PRINCIPAL DISCHARGE DIAGNOSIS  Diagnosis: Uncontrolled type 2 diabetes mellitus with hyperglycemia  Assessment and Plan of Treatment: You have a known history of diabetes mellitus prior to your admission. This condition results from blood sugar levels getting too high because your body is more resistant to insulin. Uncontrolled blood sugar levels can lead to kidney and heart damage, pain/numbness/paralysis in your hands and feet, and increased rates of infections.  To manage this you are on a medication. It is important that you continue to take this medication when you are discharged so that you can continue to control your blood sugar levels. Additionally be sure to follow up with your primary care physician, podiatrist, and ophthalmologist on a regular basis. Please follow up with Dr. Joseph at [facility] by calling [phone] to make an appointment.      SECONDARY DISCHARGE DIAGNOSES  Diagnosis: Intertrigo  Assessment and Plan of Treatment: Intertrigo is a common inflammatory skin condition that is caused by skin-to-skin friction (rubbing) that is intensified by heat and moisture. It usually looks like a reddish rash. Trapped moisture, which is usually due to sweating, causes the surfaces of your skin to stick together in your skin folds. The moisture increases the friction, which leads to skin damage and inflammation. In many cases of intertrigo, damage to the skin allows bacteria and/or fungus normally present on the surface of your skin to overgrow. The warmth, trapped moisture and friction-induced skin damage create an ideal environment for bacteria and fungi to grow and multiply. This overgrowth of bacteria and/or fungi triggers your immune system to respond, which results in secondary inflammation and a visible rash. In more severe cases, the bacterial and/or fungal overgrowth is significant enough to cause a secondary infection. Please return to hospital for any worsening local symptoms of pain, redness, swelling, or any systemic symptoms of fever/chills/sweats. Please continue using the Nystatin powder twice a dayand take fluconazole 150 mg pill once a week for 4 weeks once your leave the hospital.     PRINCIPAL DISCHARGE DIAGNOSIS  Diagnosis: Uncontrolled type 2 diabetes mellitus with hyperglycemia  Assessment and Plan of Treatment: You have a known history of diabetes mellitus prior to your admission. This condition results from blood sugar levels getting too high because your body is more resistant to insulin. Uncontrolled blood sugar levels can lead to kidney and heart damage, pain/numbness/paralysis in your hands and feet, and increased rates of infections.  To manage this you are on a medication. It is important that you continue to take this medication when you are discharged so that you can continue to control your blood sugar levels. Additionally be sure to follow up with your primary care physician, podiatrist, and ophthalmologist on a regular basis. Please follow up with Dr. Joseph at [facility] by calling [phone] to make an appointment.      SECONDARY DISCHARGE DIAGNOSES  Diagnosis: Intertrigo  Assessment and Plan of Treatment: Intertrigo is a common inflammatory skin condition that is caused by skin-to-skin friction (rubbing) that is intensified by heat and moisture. It usually looks like a reddish rash. Trapped moisture, which is usually due to sweating, causes the surfaces of your skin to stick together in your skin folds. The moisture increases the friction, which leads to skin damage and inflammation. In many cases of intertrigo, damage to the skin allows bacteria and/or fungus normally present on the surface of your skin to overgrow. The warmth, trapped moisture and friction-induced skin damage create an ideal environment for bacteria and fungi to grow and multiply. This overgrowth of bacteria and/or fungi triggers your immune system to respond, which results in secondary inflammation and a visible rash. In more severe cases, the bacterial and/or fungal overgrowth is significant enough to cause a secondary infection. Please return to hospital for any worsening local symptoms of pain, redness, swelling, or any systemic symptoms of fever/chills/sweats. Please continue using the clotrinazole 2% cream vaginal external twice daily for 7 days, Nystatin powder twice a day for 14 days, Fluconazole 400 mg oral daily for 14 days     PRINCIPAL DISCHARGE DIAGNOSIS  Diagnosis: Uncontrolled type 2 diabetes mellitus with hyperglycemia  Assessment and Plan of Treatment: You have a known history of diabetes mellitus prior to your admission. This condition results from blood sugar levels getting too high because your body is more resistant to insulin. Uncontrolled blood sugar levels can lead to kidney and heart damage, pain/numbness/paralysis in your hands and feet, and increased rates of infections.  To manage this you are on a medication. It is important that you continue to take this medication when you are discharged so that you can continue to control your blood sugar levels. Additionally be sure to follow up with your primary care physician, podiatrist, and ophthalmologist on a regular basis. Please follow up with Dr. Joseph at [facility] by calling [phone] to make an appointment.      SECONDARY DISCHARGE DIAGNOSES  Diagnosis: Intertrigo  Assessment and Plan of Treatment: Intertrigo is a common inflammatory skin condition that is caused by skin-to-skin friction (rubbing) that is intensified by heat and moisture. It usually looks like a reddish rash. Trapped moisture, which is usually due to sweating, causes the surfaces of your skin to stick together in your skin folds. The moisture increases the friction, which leads to skin damage and inflammation. In many cases of intertrigo, damage to the skin allows bacteria and/or fungus normally present on the surface of your skin to overgrow. The warmth, trapped moisture and friction-induced skin damage create an ideal environment for bacteria and fungi to grow and multiply. This overgrowth of bacteria and/or fungi triggers your immune system to respond, which results in secondary inflammation and a visible rash. In more severe cases, the bacterial and/or fungal overgrowth is significant enough to cause a secondary infection. Please return to hospital for any worsening local symptoms of pain, redness, swelling, or any systemic symptoms of fever/chills/sweats. Please continue using the clotrinazole 2% cream vaginal external twice daily for 7 days, Nystatin powder twice a day for 14 days, Fluconazole 400 mg oral daily for 14 days

## 2024-01-03 NOTE — DISCHARGE NOTE PROVIDER - CARE PROVIDER_API CALL
Nisha Joseph  Endocrinology/Metab/Diabetes  [address] [place] [id number]  Phone: [phone]  Fax: [phone]  Established Patient  Follow Up Time: 2 weeks   Nisha Joseph  Endocrinology/Metab/Diabetes  [address] [place] [id number]  Phone: [phone]  Fax: [phone]  Established Patient  Follow Up Time: 2 weeks

## 2024-01-03 NOTE — DISCHARGE NOTE PROVIDER - ATTENDING DISCHARGE PHYSICAL EXAMINATION:
-Gen: NAD, resting in bed  -HEENT: EOMI, PERRL, no scleral icterus  -CV: normal S1 and S2  -Lungs: CTABL, normal respiratory effort on RA  -Ab: soft, NT, ND, normal BS  -/Groin: patient deferred exam   -Ext: no LE edema   -Neuro: A&O x 3, no focal deficits

## 2024-01-03 NOTE — PROGRESS NOTE ADULT - PROBLEM SELECTOR PLAN 7
Know history of CAD s/p multiple stents, takes Plavix 75 mg qd and Lipitor 80 mg qhs at home.   - [facility] 06/26/23: NAT x 1 pRCA (85%) and NAT x 1 RPLS (80%); residual dLCx/OM1 70%, mLAD 50%. staged PCI 08/01/2023 NAT x 1 mLCx/OM1 (70%), RCA: patent stents, LM: short segment, MLI, mLAD: 40%.  TTE 06/23/23: Mild LVH, hyperdynamic LV systolic function, normal RV sise and systolic function  s/p staged PCI 8/2023  Admission EKG 12/31/23 w/ sinus tachycardia  - continue plavix and lipitor Know history of CAD s/p multiple stents, takes Plavix 75 mg qd and Lipitor 80 mg qhs at home.   - [facility] 06/26/23: NAT x 1 pRCA (85%) and NAT x 1 RPLS (80%); residual dLCx/OM1 70%, mLAD 50%. staged PCI 08/01/2023 NAT x 1 mLCx/OM1 (70%), RCA: patent stents, LM: short segment, MLI, mLAD: 40%.  TTE 06/23/23: Mild LVH, hyperdynamic LV systolic function, normal RV sise and systolic function  s/p staged PCI 8/2023  Admission EKG 12/31/23 w/ sinus tachycardia  - continue plavix and lipitor

## 2024-01-03 NOTE — PROGRESS NOTE ADULT - PROBLEM SELECTOR PLAN 1
Known h/o IDDM, endocrine consulted last admission for management, discharged pt on Humalog 60u TID + Lispro 26u TID. Pt states she takes Humalog 80u TID and Lispro 36u TID at home. Noted to be hyperglycemic throughout last hospitalization. This hospitalization presented w/ abd pain, N/V and found to have glucose >600 and AG on admission. A1c 16.4% (1/1). S/p insulin gtt + IVF, now taking PO.  - f/u endo recs   - continue NPH 72u q8h  - continue Lispro 40u TID pre-meal  - c/w customized Lispro correctional scale per endo

## 2024-01-03 NOTE — PHYSICAL THERAPY INITIAL EVALUATION ADULT - ADDITIONAL COMMENTS
pt lives w/ her family in an elevator access apt building w/ no stairs to enter. Ambulates short distances w/ use of rollator. typically uses an AFO for L foot drop but it has been broken. denies hx of recent falls.

## 2024-01-03 NOTE — PROGRESS NOTE ADULT - PROBLEM SELECTOR PLAN 2
Known h/o L labial abscess (s/p drainage last admission 8/2023) now presented w/ worsening groin redness, physical exam c/w intertrigo. CT A/P w/o abscess or fluid collection. Consulted ID given complex infectious history, thinking it is likely intertrigo as opposed to SSTI. BCx NGTD.    - appreciate further ID recs  - continue nystatin powder BID  - fluconazole 150 mg PO q-weekly x4 weeks for rash  - continue Ertapenem 1 g IV qd x5days (12/31-1/4)  - continue Linezolid 600 mg PO q12h x5days (12/31-1/4) Known h/o L labial abscess (s/p drainage last admission 8/2023) now presented w/ worsening groin redness, physical exam c/w intertrigo. CT A/P w/o abscess or fluid collection. Consulted ID given complex infectious history, thinking it is likely intertrigo as opposed to SSTI. BCx NGTD.    - appreciate further ID recs  - continue nystatin powder BID  - fluconazole 150 mg PO q-weekly x4 weeks for rash  - d/c Ertapenem 1 g IV qd  - d/c Linezolid 600 mg PO q12h

## 2024-01-03 NOTE — DISCHARGE NOTE PROVIDER - PROVIDER TOKENS
PROVIDER:[TOKEN:[[id number]],FOLLOWUP:[2 weeks],ESTABLISHEDPATIENT:[T]] PROVIDER:[TOKEN:[[id number]],FOLLOWUP:[2 weeks],ESTABLISHEDPATIENT:[T]]

## 2024-01-03 NOTE — PROGRESS NOTE ADULT - PROBLEM SELECTOR PLAN 3
Known h/o frequent UTIs 2/2 suprapubic cath (removed 6/2023), recent admission for klebsiella and E coli UTI. Also has chronic dysuria. UCx 7/2023 +MSSA and E. faecalis, s/o linezolid course. UA w/ WBCs >24.    - UCx 12/31: +MSSA  - continue Ertapenem 1 g IV qd x5days (12/31-1/4)  - continue Linezolid 600 mg PO q12h x5days (12/31-1/4)  - appreciate further ID recs Known h/o frequent UTIs 2/2 suprapubic cath (removed 6/2023), recent admission for klebsiella and E coli UTI. Also has chronic dysuria. UCx 7/2023 +MSSA and E. faecalis, s/o linezolid course. UA w/ WBCs >24.    - UCx 12/31: +MSSA  - d/c Ertapenem 1 g IV qd   - d/c Linezolid 600 mg PO q12h   - appreciate further ID recs

## 2024-01-03 NOTE — DISCHARGE NOTE PROVIDER - NSDCMRMEDTOKEN_GEN_ALL_CORE_FT
acetaminophen 325 mg oral tablet: 3 tab(s) orally every 8 hours  Admelog 100 units/mL injectable solution: 36 unit(s) injectable 3 times a day (with meals)  apixaban 5 mg oral tablet: 1 tab(s) orally every 12 hours  atorvastatin 80 mg oral tablet: 1 tab(s) orally once a day (at bedtime)  Cardiac Rehab: Cardiac Rehab: 3x/week for a total of 12 weeks. Diagnosis: CAD s/p PCI placement.  Cardiac Rehab: Cardiac Rehab: 3x/week for a total of 12 weeks. Diagnosis: CAD s/p PCI placement.  clopidogrel 75 mg oral tablet: 1 tab(s) orally once a day  clotrimazole 2% vaginal cream with applicator: 1 applicatorful vaginal once a day complete treatment after 1 more dose.  Dexcom G7 [place]: Use as directed  dexcom G7 Sensor: Change every 10 days  gabapentin 600 mg oral tablet: 1 tab(s) orally 3 times a day  HumuLIN R (Concentrated) 500 units/mL subcutaneous solution: 80 unit(s) subcutaneous 3 times a day  lactobacillus acidophilus oral capsule: 1 cap(s) orally once a day  linezolid 600 mg oral tablet: 1 tab(s) orally every 12 hours  metoprolol succinate 50 mg oral tablet, extended release: 1 tab(s) orally once a day  oxyCODONE 5 mg oral tablet: 1 tab(s) orally every 6 hours as needed for  severe pain MDD: 4 tabs  pantoprazole 40 mg oral delayed release tablet: 1 tab(s) orally once a day (before a meal)  semaglutide 0.25 mg/0.5 mL (0.25 mg dose) subcutaneous solution: 0.25 milligram(s) subcutaneously once a week Inject 0.25mg once weekly for 4 weeks, and then increase to 0.5mg weekly if tolerating.   acetaminophen 325 mg oral tablet: 3 tab(s) orally every 8 hours  Admelog 100 units/mL injectable solution: 36 unit(s) injectable 3 times a day (with meals)  apixaban 5 mg oral tablet: 1 tab(s) orally every 12 hours  atorvastatin 80 mg oral tablet: 1 tab(s) orally once a day (at bedtime)  Cardiac Rehab: Cardiac Rehab: 3x/week for a total of 12 weeks. Diagnosis: CAD s/p PCI placement.  Cardiac Rehab: Cardiac Rehab: 3x/week for a total of 12 weeks. Diagnosis: CAD s/p PCI placement.  clopidogrel 75 mg oral tablet: 1 tab(s) orally once a day  clotrimazole 2% vaginal cream with applicator: 1 applicatorful vaginal once a day complete treatment after 1 more dose.  Dexcom G7 [place]: Use as directed  dexcom G7 Sensor: Change every 10 days  gabapentin 600 mg oral tablet: 1 tab(s) orally 3 times a day  HumuLIN R (Concentrated) 500 units/mL subcutaneous solution: 80 unit(s) subcutaneous 3 times a day  lactobacillus acidophilus oral capsule: 1 cap(s) orally once a day  linezolid 600 mg oral tablet: 1 tab(s) orally every 12 hours  metoprolol succinate 50 mg oral tablet, extended release: 1 tab(s) orally once a day  oxyCODONE 5 mg oral tablet: 1 tab(s) orally every 6 hours as needed for  severe pain MDD: 4 tabs  pantoprazole 40 mg oral delayed release tablet: 1 tab(s) orally once a day (before a meal)  semaglutide 0.25 mg/0.5 mL (0.25 mg dose) subcutaneous solution: 0.25 milligram(s) subcutaneously once a week Inject 0.25mg once weekly for 4 weeks, and then increase to 0.5mg weekly if tolerating.   Admelog 100 units/mL injectable solution: 36 unit(s) injectable 3 times a day (with meals)  apixaban 5 mg oral tablet: 1 tab(s) orally every 12 hours  atorvastatin 80 mg oral tablet: 1 tab(s) orally once a day (at bedtime)  Cardiac Rehab: Cardiac Rehab: 3x/week for a total of 12 weeks. Diagnosis: CAD s/p PCI placement.  Cardiac Rehab: Cardiac Rehab: 3x/week for a total of 12 weeks. Diagnosis: CAD s/p PCI placement.  clopidogrel 75 mg oral tablet: 1 tab(s) orally once a day  Dexcom G7 [place]: Use as directed  dexcom G7 Sensor: Change every 10 days  fluconazole 150 mg oral tablet: 1 tab(s) orally  gabapentin 600 mg oral tablet: 1 tab(s) orally 3 times a day  HumuLIN R (Concentrated) 500 units/mL subcutaneous solution: 80 unit(s) subcutaneous 3 times a day  lactobacillus acidophilus oral capsule: 1 cap(s) orally once a day  metoprolol succinate 50 mg oral tablet, extended release: 1 tab(s) orally once a day  nystatin 100,000 units/g topical powder: 1 Apply topically to affected area 2 times a day  oxyCODONE 5 mg oral tablet: 1 tab(s) orally every 6 hours as needed for  severe pain MDD: 4 tabs  pantoprazole 40 mg oral delayed release tablet: 1 tab(s) orally once a day (before a meal)  semaglutide 0.25 mg/0.5 mL (0.25 mg dose) subcutaneous solution: 0.25 milligram(s) subcutaneously once a week Inject 0.25mg once weekly for 4 weeks, and then increase to 0.5mg weekly if tolerating.   Admelog 100 units/mL injectable solution: 36 unit(s) injectable 3 times a day (with meals)  apixaban 5 mg oral tablet: 1 tab(s) orally every 12 hours  atorvastatin 80 mg oral tablet: 1 tab(s) orally once a day (at bedtime)  Cardiac Rehab: Cardiac Rehab: 3x/week for a total of 12 weeks. Diagnosis: CAD s/p PCI placement.  Cardiac Rehab: Cardiac Rehab: 3x/week for a total of 12 weeks. Diagnosis: CAD s/p PCI placement.  clopidogrel 75 mg oral tablet: 1 tab(s) orally once a day  Dexcom G7 [place]: Use as directed  dexcom G7 Sensor: Change every 10 days  fluconazole 150 mg oral tablet: 1 tab(s) orally  gabapentin 600 mg oral tablet: 1 tab(s) orally 3 times a day  HumuLIN R (Concentrated) 500 units/mL subcutaneous solution: 80 unit(s) subcutaneous 3 times a day  lactobacillus acidophilus oral capsule: 1 cap(s) orally once a day  metoprolol succinate 50 mg oral tablet, extended release: 1 tab(s) orally once a day  nystatin 100,000 units/g topical powder: 1 Apply topically to affected area 2 times a day  oxyCODONE 5 mg oral tablet: 1 tab(s) orally every 6 hours as needed for  severe pain MDD: 4 tabs  pantoprazole 40 mg oral delayed release tablet: 1 tab(s) orally once a day (before a meal)  semaglutide 0.25 mg/0.5 mL (0.25 mg dose) subcutaneous solution: 0.25 milligram(s) subcutaneously once a week Inject 0.25mg once weekly for 4 weeks, and then increase to 0.5mg weekly if tolerating.   apixaban 5 mg oral tablet: 1 tab(s) orally every 12 hours  atorvastatin 80 mg oral tablet: 1 tab(s) orally once a day (at bedtime)  clopidogrel 75 mg oral tablet: 1 tab(s) orally once a day  Dexcom G7 [place]: Use as directed  dexcom G7 Sensor: Change every 10 days  gabapentin 600 mg oral tablet: 1 tab(s) orally 3 times a day  lactobacillus acidophilus oral capsule: 1 cap(s) orally once a day  metoprolol succinate 50 mg oral tablet, extended release: 1 tab(s) orally once a day  nystatin 100,000 units/g topical powder: 1 Apply topically to affected area 2 times a day  oxyCODONE 5 mg oral tablet: 1 tab(s) orally every 6 hours as needed for  severe pain MDD: 4 tabs  pantoprazole 40 mg oral delayed release tablet: 1 tab(s) orally once a day (before a meal)  semaglutide 0.25 mg/0.5 mL (0.25 mg dose) subcutaneous solution: 0.25 milligram(s) subcutaneously once a week Inject 0.25mg once weekly for 4 weeks, and then increase to 0.5mg weekly if tolerating.   apixaban 5 mg oral tablet: 1 tab(s) orally every 12 hours  atorvastatin 80 mg oral tablet: 1 tab(s) orally once a day (at bedtime)  clopidogrel 75 mg oral tablet: 1 tab(s) orally once a day  Dexcom G7 [place]: Use as directed  dexcom G7 Sensor: Change every 10 days  gabapentin 600 mg oral tablet: 1 tab(s) orally 3 times a day  lactobacillus acidophilus oral capsule: 1 cap(s) orally once a day  metoprolol succinate 50 mg oral tablet, extended release: 1 tab(s) orally once a day  nystatin 100,000 units/g topical powder: 1 Apply topically to affected area 2 times a day  oxyCODONE 5 mg oral tablet: 1 tab(s) orally every 6 hours as needed for  severe pain MDD: 4 tabs  pantoprazole 40 mg oral delayed release tablet: 1 tab(s) orally once a day (before a meal)  semaglutide 0.25 mg/0.5 mL (0.25 mg dose) subcutaneous solution: 0.25 milligram(s) subcutaneously once a week Inject 0.25mg once weekly for 4 weeks, and then increase to 0.5mg weekly if tolerating.   Admelog 100 units/mL injectable solution: 40 unit(s) injectable 3 times a day (before meals)  apixaban 5 mg oral tablet: 1 tab(s) orally every 12 hours  atorvastatin 80 mg oral tablet: 1 tab(s) orally once a day (at bedtime)  clopidogrel 75 mg oral tablet: 1 tab(s) orally once a day  clotrimazole 1% topical cream: Apply topically to affected area once a day  Dexcom G7 [place]: Use as directed  dexcom G7 Sensor: Change every 10 days  Diflucan 150 mg oral tablet: 1 tab(s) orally once a week  gabapentin 600 mg oral tablet: 1 tab(s) orally 3 times a day  HumuLIN R (Concentrated) 500 units/mL subcutaneous solution: 70 unit(s) subcutaneous 3 times a day  lactobacillus acidophilus oral capsule: 1 cap(s) orally once a day  metFORMIN 500 mg oral tablet, extended release: 2 tab(s) orally once a day  metoprolol succinate 50 mg oral tablet, extended release: 1 tab(s) orally once a day  nystatin 100,000 units/g topical powder: 1 Apply topically to affected area 2 times a day  oxyCODONE 5 mg oral tablet: 1 tab(s) orally every 6 hours as needed for  severe pain MDD: 4 tabs  pantoprazole 40 mg oral delayed release tablet: 1 tab(s) orally once a day (before a meal)  semaglutide 0.25 mg/0.5 mL (0.25 mg dose) subcutaneous solution: 0.25 milligram(s) subcutaneously once a week Inject 0.25mg once weekly for 4 weeks, and then increase to 0.5mg weekly if tolerating.   Admelog 100 units/mL injectable solution: 40 unit(s) injectable 3 times a day (before meals)  apixaban 5 mg oral tablet: 1 tab(s) orally every 12 hours  atorvastatin 80 mg oral tablet: 1 tab(s) orally once a day (at bedtime)  clopidogrel 75 mg oral tablet: 1 tab(s) orally once a day  clotrimazole 1% topical cream: Apply topically to affected area once a day  Dexcom G7 [place]: Use as directed  dexcom G7 Sensor: Change every 10 days  Diflucan 150 mg oral tablet: 1 tab(s) orally once a week  gabapentin 600 mg oral tablet: 1 tab(s) orally 3 times a day  HumuLIN R (Concentrated) 500 units/mL subcutaneous solution: 70 unit(s) subcutaneous 3 times a day  lactobacillus acidophilus oral capsule: 1 cap(s) orally once a day  metFORMIN 500 mg oral tablet, extended release: 2 tab(s) orally once a day  metoprolol succinate 50 mg oral tablet, extended release: 1 tab(s) orally once a day  nystatin 100,000 units/g topical powder: 1 Apply topically to affected area 2 times a day  oxyCODONE 5 mg oral tablet: 1 tab(s) orally every 6 hours as needed for  severe pain MDD: 4 tabs  pantoprazole 40 mg oral delayed release tablet: 1 tab(s) orally once a day (before a meal)  semaglutide 0.25 mg/0.5 mL (0.25 mg dose) subcutaneous solution: 0.25 milligram(s) subcutaneously once a week Inject 0.25mg once weekly for 4 weeks, and then increase to 0.5mg weekly if tolerating.   Admelog 100 units/mL injectable solution: 40 unit(s) injectable 3 times a day (before meals)  apixaban 5 mg oral tablet: 1 tab(s) orally every 12 hours  atorvastatin 80 mg oral tablet: 1 tab(s) orally once a day (at bedtime)  clopidogrel 75 mg oral tablet: 1 tab(s) orally once a day  Dexcom G7 [place]: Use as directed  dexcom G7 Sensor: Change every 10 days  gabapentin 600 mg oral tablet: 1 tab(s) orally 3 times a day  HumuLIN R (Concentrated) 500 units/mL subcutaneous solution: 70 unit(s) subcutaneous 3 times a day  lactobacillus acidophilus oral capsule: 1 cap(s) orally once a day  metFORMIN 500 mg oral tablet, extended release: 2 tab(s) orally once a day  metoprolol succinate 50 mg oral tablet, extended release: 1 tab(s) orally once a day  nystatin 100,000 units/g topical powder: 1 Apply topically to affected area 2 times a day  oxyCODONE 5 mg oral tablet: 1 tab(s) orally every 6 hours as needed for  severe pain MDD: 4 tabs  pantoprazole 40 mg oral delayed release tablet: 1 tab(s) orally once a day (before a meal)  semaglutide 0.25 mg/0.5 mL (0.25 mg dose) subcutaneous solution: 0.25 milligram(s) subcutaneously once a week Inject 0.25mg once weekly for 4 weeks, and then increase to 0.5mg weekly if tolerating.   Admelog 100 units/mL injectable solution: 40 unit(s) injectable 3 times a day (before meals)  apixaban 5 mg oral tablet: 1 tab(s) orally every 12 hours  atorvastatin 80 mg oral tablet: 1 tab(s) orally once a day (at bedtime)  clopidogrel 75 mg oral tablet: 1 tab(s) orally once a day  Dexcom G7 [place]: Use as directed  dexcom G7 Sensor: Change every 10 days  gabapentin 600 mg oral tablet: 1 tab(s) orally 3 times a day  HumuLIN R (Concentrated) 500 units/mL subcutaneous solution: 70 unit(s) subcutaneous 3 times a day  lactobacillus acidophilus oral capsule: 1 cap(s) orally once a day  metFORMIN 500 mg oral tablet, extended release: 2 tab(s) orally once a day  metoprolol succinate 50 mg oral tablet, extended release: 1 tab(s) orally once a day  nystatin 100,000 units/g topical powder: 1 Apply topically to affected area 2 times a day  oxyCODONE 5 mg oral tablet: 1 tab(s) orally every 6 hours as needed for  severe pain MDD: 4 tabs  pantoprazole 40 mg oral delayed release tablet: 1 tab(s) orally once a day (before a meal)  semaglutide 0.25 mg/0.5 mL (0.25 mg dose) subcutaneous solution: 0.25 milligram(s) subcutaneously once a week Inject 0.25mg once weekly for 4 weeks, and then increase to 0.5mg weekly if tolerating.   Admelog 100 units/mL injectable solution: 40 unit(s) injectable 3 times a day (before meals)  apixaban 5 mg oral tablet: 1 tab(s) orally every 12 hours  atorvastatin 80 mg oral tablet: 1 tab(s) orally once a day (at bedtime)  clopidogrel 75 mg oral tablet: 1 tab(s) orally once a day  Dexcom G7 [place]: Use as directed  dexcom G7 Sensor: Change every 10 days  gabapentin 600 mg oral tablet: 1 tab(s) orally 3 times a day  HumuLIN R (Concentrated) 500 units/mL subcutaneous solution: 70 unit(s) subcutaneous 3 times a day  lactobacillus acidophilus oral capsule: 1 cap(s) orally once a day  metFORMIN 500 mg oral tablet, extended release: 2 tab(s) orally once a day  metoprolol succinate 50 mg oral tablet, extended release: 1 tab(s) orally once a day  nystatin 100,000 units/g topical powder: Apply topically to affected area 2 times a day  oxyCODONE 5 mg oral tablet: 1 tab(s) orally every 6 hours as needed for  severe pain MDD: 4 tabs  pantoprazole 40 mg oral delayed release tablet: 1 tab(s) orally once a day (before a meal)  semaglutide 0.25 mg/0.5 mL (0.25 mg dose) subcutaneous solution: 0.25 milligram(s) subcutaneously once a week Inject 0.25mg once weekly for 4 weeks, and then increase to 0.5mg weekly if tolerating.   Admelog 100 units/mL injectable solution: 40 unit(s) injectable 3 times a day (before meals)  apixaban 5 mg oral tablet: 1 tab(s) orally every 12 hours  atorvastatin 80 mg oral tablet: 1 tab(s) orally once a day (at bedtime)  clopidogrel 75 mg oral tablet: 1 tab(s) orally once a day  Dexcom G7 [place]: Use as directed  dexcom G7 Sensor: Change every 10 days  gabapentin 600 mg oral tablet: 1 tab(s) orally 3 times a day  HumuLIN R (Concentrated) 500 units/mL subcutaneous solution: 70 unit(s) subcutaneous 3 times a day  lactobacillus acidophilus oral capsule: 1 cap(s) orally once a day  metFORMIN 500 mg oral tablet, extended release: 2 tab(s) orally once a day  metoprolol succinate 50 mg oral tablet, extended release: 1 tab(s) orally once a day  nystatin 100,000 units/g topical powder: Apply topically to affected area 2 times a day  oxyCODONE 5 mg oral tablet: 1 tab(s) orally every 6 hours as needed for  severe pain MDD: 4 tabs  pantoprazole 40 mg oral delayed release tablet: 1 tab(s) orally once a day (before a meal)  semaglutide 0.25 mg/0.5 mL (0.25 mg dose) subcutaneous solution: 0.25 milligram(s) subcutaneously once a week Inject 0.25mg once weekly for 4 weeks, and then increase to 0.5mg weekly if tolerating.

## 2024-01-03 NOTE — ADVANCED PRACTICE NURSE CONSULT - ASSESSMENT
Pt c/o pain to groin, more than her baseline. Also reports itching. 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   Colostomy with brown stool. Utilizing Primafit.     Right groin with scarring from previous surgeries. Skin is very thin and fragile. Denuded area beside labia. Evidence of fungal involvement to right inner thigh.  Pt c/o pain to groin, more than her baseline. Also reports itching. 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   Colostomy with brown stool. Utilizing Primafit.     Right groin with scarring from previous surgeries. Skin is very thin and fragile. Denuded area beside labia. Evidence of fungal involvement to right inner thigh. No induration, no fluctuance. Area is very tender to the touch.  Left groin mild intertrigo within skin fold.   Abdominal pannus without any intertrigo.  Pt c/o pain to groin, more than her baseline. Also reports itching. 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   Colostomy with brown stool. Utilizing Primafit.     Right groin with scarring from previous surgeries. Skin is very thin and fragile. Denuded area beside labia. Evidence of fungal involvement to right inner thigh. No induration, no fluctuance. Area is tender to the touch.  Left groin mild intertrigo within skin fold.   Abdominal pannus without any intertrigo.

## 2024-01-03 NOTE — ADVANCED PRACTICE NURSE CONSULT - RECOMMEDATIONS
Recommend switching to a antifungal powder rather than cream.     Discussed with medical team.  Recommend switching to an antifungal powder rather than cream.   Monitor area for increased redness, new induration, pain.    Discussed with medical team.     Please reconsult for new concerns.

## 2024-01-03 NOTE — DISCHARGE NOTE PROVIDER - HOSPITAL COURSE
HASMUKH AGUILERA is a 43y Female with a past medical history of asthma, CVA (residual L>R weakness), PE (on Eliquis), multiple abdominal hernia repeairs, DMII, HTN, HLD, abdominal necrotizing fasciitis (s/p ostomy), frequent UTIs, CAD (s/p recent PCI) who presented with left groin swelling/pain, found to have hyperglycemia.      Problem List/Main Diagnoses (system-based):   #Uncontrolled type 2 diabetes mellitus with hyperglycemia.   Pt with known IDDM, presented w/ abd pain, N/V and found to have glucose >600 and AG on admission. A1c 16.4% (1/1/24). Pt admitted to MICU for insulin gtt + IVF. Pt then stabilized and stepped down to RMF. Endocrinology consulted and recommended pt continue.........  - c/w     #Intertrigo labialis.   Pt with known h/o L labial abscess (s/p drainage last admission 8/2023) now presented w/ worsening groin redness, physical exam c/w intertrigo. CT A/P w/o abscess or fluid collection. BCx NGTD. ID was consulted given complex infectious history. Pt was placed briefly on Ertapenem and Linezolid. Nystatin powder BID and fluconazole 150 mg PO q-weekly x4 weeks for rash for Intertrigo   - C/w Nystatin powder BID   - C/w fluconazole 150 mg PO q-weekly x4 weeks     #Frequent UTI.   Pt with known h/o frequent UTIs 2/2 suprapubic cath (removed 6/2023), recent admission for klebsiella and E coli UTI. Also has chronic dysuria. UCx 7/2023 +MSSA and E. faecalis, s/p linezolid course. UA w/ WBCs >24. UCx 12/31: +MSSA s/p Ertapenem 1 g IV qd and Linezolid 600 mg PO q12h   - C/w cath hygiene     #Hypertension.   Pt with known HTN takes Metoprolol succinate ER 50 mg qd at home.  - continue home med     # Colostomy.   #h/o abdominal necrotizing fasciitis   #colostomy hernia  Pt presented w/ groin pain, abdominal pain and N/V. Was hospitalized at [facility] 8/2023 for L groin abscess and purulent cellulitis. Known h/o abdominal necrotizing fascitis s/p ostomy placement. Has pain around ostomy site. CT A/P 12/31: no fluid collection or evidence of necrotizing fasciitis of L groin or L mid upper thigh. Surgery consulted and recommended no intervention.    #History of pulmonary embolism.   Pt with known h/o b/l PEs in segmental and subsegmental branches on R and subsegmental branches on L, diagnosed on CT chest during ED visit 4/7/23. Takes Eliquis 5 mg BID at home.  - continue home Eliquis.    #CAD (coronary artery disease).   Pt with know history of CAD s/p multiple stents, takes Plavix 75 mg qd and Lipitor 80 mg qhs at home.   - [facility] 06/26/23: NAT x 1 pRCA (85%) and NAT x 1 RPLS (80%); residual dLCx/OM1 70%, mLAD 50%. staged PCI 08/01/2023 NAT x 1 mLCx/OM1 (70%), RCA: patent stents, LM: short segment, MLI, mLAD: 40%.  TTE 06/23/23: Mild LVH, hyperdynamic LV systolic function, normal RV sise and systolic function  s/p staged PCI 8/2023  Admission EKG 12/31/23 w/ sinus tachycardia  - continue home plavix and lipitor.    Patient was discharged to: (home/[facility]/acute rehab/hospice, etc. and w/ home health/home PT/RN/home O2)    New medications:   - C/w Nystatin powder BID   - C/w fluconazole 150 mg PO q-weekly x4 weeks     Items to follow up as outpatient:    Physical exam at the time of discharge:       LABS & STUDIES:  SARS-CoV-2: Chiaratec (31 Dec 2023 12:46)   HASMUKH AGUILERA is a 43y Female with a past medical history of asthma, CVA (residual L>R weakness), PE (on Eliquis), multiple abdominal hernia repeairs, DMII, HTN, HLD, abdominal necrotizing fasciitis (s/p ostomy), frequent UTIs, CAD (s/p recent PCI) who presented with left groin swelling/pain, found to have hyperglycemia.      Problem List/Main Diagnoses (system-based):   #Uncontrolled type 2 diabetes mellitus with hyperglycemia.   Pt with known IDDM, presented w/ abd pain, N/V and found to have glucose >600 and AG on admission. A1c 16.4% (1/1/24). Pt admitted to MICU for insulin gtt + IVF. Pt then stabilized and stepped down to RMF. Endocrinology consulted and recommended pt continue.........  - c/w     #Intertrigo labialis.   Pt with known h/o L labial abscess (s/p drainage last admission 8/2023) now presented w/ worsening groin redness, physical exam c/w intertrigo. CT A/P w/o abscess or fluid collection. BCx NGTD. ID was consulted given complex infectious history. Pt was placed briefly on Ertapenem and Linezolid. Nystatin powder BID and fluconazole 150 mg PO q-weekly x4 weeks for rash for Intertrigo   - C/w Nystatin powder BID   - C/w fluconazole 150 mg PO q-weekly x4 weeks     #Frequent UTI.   Pt with known h/o frequent UTIs 2/2 suprapubic cath (removed 6/2023), recent admission for klebsiella and E coli UTI. Also has chronic dysuria. UCx 7/2023 +MSSA and E. faecalis, s/p linezolid course. UA w/ WBCs >24. UCx 12/31: +MSSA s/p Ertapenem 1 g IV qd and Linezolid 600 mg PO q12h   - C/w cath hygiene     #Hypertension.   Pt with known HTN takes Metoprolol succinate ER 50 mg qd at home.  - continue home med     # Colostomy.   #h/o abdominal necrotizing fasciitis   #colostomy hernia  Pt presented w/ groin pain, abdominal pain and N/V. Was hospitalized at [facility] 8/2023 for L groin abscess and purulent cellulitis. Known h/o abdominal necrotizing fascitis s/p ostomy placement. Has pain around ostomy site. CT A/P 12/31: no fluid collection or evidence of necrotizing fasciitis of L groin or L mid upper thigh. Surgery consulted and recommended no intervention.    #History of pulmonary embolism.   Pt with known h/o b/l PEs in segmental and subsegmental branches on R and subsegmental branches on L, diagnosed on CT chest during ED visit 4/7/23. Takes Eliquis 5 mg BID at home.  - continue home Eliquis.    #CAD (coronary artery disease).   Pt with know history of CAD s/p multiple stents, takes Plavix 75 mg qd and Lipitor 80 mg qhs at home.   - [facility] 06/26/23: NAT x 1 pRCA (85%) and NAT x 1 RPLS (80%); residual dLCx/OM1 70%, mLAD 50%. staged PCI 08/01/2023 NAT x 1 mLCx/OM1 (70%), RCA: patent stents, LM: short segment, MLI, mLAD: 40%.  TTE 06/23/23: Mild LVH, hyperdynamic LV systolic function, normal RV sise and systolic function  s/p staged PCI 8/2023  Admission EKG 12/31/23 w/ sinus tachycardia  - continue home plavix and lipitor.    Patient was discharged to: (home/[facility]/acute rehab/hospice, etc. and w/ home health/home PT/RN/home O2)    New medications:   - C/w Nystatin powder BID   - C/w fluconazole 150 mg PO q-weekly x4 weeks     Items to follow up as outpatient:    Physical exam at the time of discharge:       LABS & STUDIES:  SARS-CoV-2: Chiaratec (31 Dec 2023 12:46)   HASMUKH AGUILERA is a 43y Female with a past medical history of asthma, CVA (residual L>R weakness), PE (on Eliquis), multiple abdominal hernia repeairs, DMII, HTN, HLD, abdominal necrotizing fasciitis (s/p ostomy), frequent UTIs, CAD (s/p recent PCI) who presented with left groin swelling/pain, found to have hyperglycemia.      Problem List/Main Diagnoses (system-based):   #Uncontrolled type 2 diabetes mellitus with hyperglycemia.   Pt with known IDDM, presented w/ abd pain, N/V and found to have glucose >600 and AG on admission. A1c 16.4% (1/1/24). Pt admitted to MICU for insulin gtt + IVF. Pt then stabilized and stepped down to RMF. Endocrinology consulted and recommended pt continue.........  - c/w     #Intertrigo labialis.   Pt with known h/o L labial abscess (s/p drainage last admission 8/2023) now presented w/ worsening groin redness, physical exam c/w intertrigo. CT A/P w/o abscess or fluid collection. BCx NGTD. ID was consulted given complex infectious history. Pt was placed briefly on Ertapenem and Linezolid. Nystatin powder BID and fluconazole 150 mg PO q-weekly x4 weeks for rash for Intertrigo   - C/w Nystatin powder BID   - C/w fluconazole 150 mg PO q-weekly x4 weeks     #Frequent UTI.   Pt with known h/o frequent UTIs 2/2 suprapubic cath (removed 6/2023), recent admission for klebsiella and E coli UTI. Also has chronic dysuria. UCx 7/2023 +MSSA and E. faecalis, s/p linezolid course. UA w/ WBCs >24. UCx 12/31: +MSSA s/p Ertapenem 1 g IV qd and Linezolid 600 mg PO q12h   -    #Hypertension.   Pt with known HTN takes Metoprolol succinate ER 50 mg qd at home.  - continue home med     # Colostomy.   #h/o abdominal necrotizing fasciitis   #colostomy hernia  Pt presented w/ groin pain, abdominal pain and N/V. Was hospitalized at [facility] 8/2023 for L groin abscess and purulent cellulitis. Known h/o abdominal necrotizing fascitis s/p ostomy placement. Has pain around ostomy site. CT A/P 12/31: no fluid collection or evidence of necrotizing fasciitis of L groin or L mid upper thigh. Surgery consulted and recommended no intervention.    #History of pulmonary embolism.   Pt with known h/o b/l PEs in segmental and subsegmental branches on R and subsegmental branches on L, diagnosed on CT chest during ED visit 4/7/23. Takes Eliquis 5 mg BID at home.  - continue home Eliquis.    #CAD (coronary artery disease).   Pt with know history of CAD s/p multiple stents, takes Plavix 75 mg qd and Lipitor 80 mg qhs at home.   - [facility] 06/26/23: NAT x 1 pRCA (85%) and NAT x 1 RPLS (80%); residual dLCx/OM1 70%, mLAD 50%. staged PCI 08/01/2023 NAT x 1 mLCx/OM1 (70%), RCA: patent stents, LM: short segment, MLI, mLAD: 40%.  TTE 06/23/23: Mild LVH, hyperdynamic LV systolic function, normal RV sise and systolic function  s/p staged PCI 8/2023  Admission EKG 12/31/23 w/ sinus tachycardia  - continue home plavix and lipitor.    Patient was discharged to: (home/[facility]/acute rehab/hospice, etc. and w/ home health/home PT/RN/home O2)    New medications:   - C/w Nystatin powder BID   - C/w fluconazole 150 mg PO q-weekly x4 weeks     Items to follow up as outpatient:    Physical exam at the time of discharge:       LABS & STUDIES:  SARS-CoV-2: Chiarateifeanyi (31 Dec 2023 12:46)   HASMUKH AGUILERA is a 43y Female with a past medical history of asthma, CVA (residual L>R weakness), PE (on Eliquis), multiple abdominal hernia repeairs, DMII, HTN, HLD, abdominal necrotizing fasciitis (s/p ostomy), frequent UTIs, CAD (s/p recent PCI) who presented with left groin swelling/pain, found to have hyperglycemia.      Problem List/Main Diagnoses (system-based):   #Uncontrolled type 2 diabetes mellitus with hyperglycemia.   Pt with known IDDM, presented w/ abd pain, N/V and found to have glucose >600 and AG on admission. A1c 16.4% (1/1/24). Pt admitted to MICU for insulin gtt + IVF. Pt then stabilized and stepped down to RMF. Endocrinology consulted and recommended pt continue.........  - c/w     #Intertrigo labialis.   Pt with known h/o L labial abscess (s/p drainage last admission 8/2023) now presented w/ worsening groin redness, physical exam c/w intertrigo. CT A/P w/o abscess or fluid collection. BCx NGTD. ID was consulted given complex infectious history. Pt was placed briefly on Ertapenem and Linezolid. Nystatin powder BID and fluconazole 150 mg PO q-weekly x4 weeks for rash for Intertrigo   - C/w Nystatin powder BID   - C/w fluconazole 150 mg PO q-weekly x4 weeks     #Frequent UTI.   Pt with known h/o frequent UTIs 2/2 suprapubic cath (removed 6/2023), recent admission for klebsiella and E coli UTI. Also has chronic dysuria. UCx 7/2023 +MSSA and E. faecalis, s/p linezolid course. UA w/ WBCs >24. UCx 12/31: +MSSA s/p Ertapenem 1 g IV qd and Linezolid 600 mg PO q12h   -    #Hypertension.   Pt with known HTN takes Metoprolol succinate ER 50 mg qd at home.  - continue home med     # Colostomy.   #h/o abdominal necrotizing fasciitis   #colostomy hernia  Pt presented w/ groin pain, abdominal pain and N/V. Was hospitalized at [facility] 8/2023 for L groin abscess and purulent cellulitis. Known h/o abdominal necrotizing fascitis s/p ostomy placement. Has pain around ostomy site. CT A/P 12/31: no fluid collection or evidence of necrotizing fasciitis of L groin or L mid upper thigh. Surgery consulted and recommended no intervention.    #History of pulmonary embolism.   Pt with known h/o b/l PEs in segmental and subsegmental branches on R and subsegmental branches on L, diagnosed on CT chest during ED visit 4/7/23. Takes Eliquis 5 mg BID at home.  - continue home Eliquis.    #CAD (coronary artery disease).   Pt with know history of CAD s/p multiple stents, takes Plavix 75 mg qd and Lipitor 80 mg qhs at home.   - [facility] 06/26/23: NAT x 1 pRCA (85%) and NAT x 1 RPLS (80%); residual dLCx/OM1 70%, mLAD 50%. staged PCI 08/01/2023 NAT x 1 mLCx/OM1 (70%), RCA: patent stents, LM: short segment, MLI, mLAD: 40%.  TTE 06/23/23: Mild LVH, hyperdynamic LV systolic function, normal RV sise and systolic function  s/p staged PCI 8/2023  Admission EKG 12/31/23 w/ sinus tachycardia  - continue home plavix and lipitor.    Patient was discharged to: (home/[facility]/acute rehab/hospice, etc. and w/ home health/home PT/RN/home O2)    New medications:   - C/w Nystatin powder BID   - C/w fluconazole 150 mg PO q-weekly x4 weeks     Items to follow up as outpatient:    Physical exam at the time of discharge:       LABS & STUDIES:  SARS-CoV-2: Chiarateifeanyi (31 Dec 2023 12:46)   HASMUKH AGUILERA is a 43y Female with a past medical history of asthma, CVA (residual L>R weakness), PE (on Eliquis), multiple abdominal hernia repeairs, DMII, HTN, HLD, abdominal necrotizing fasciitis (s/p ostomy), frequent UTIs, CAD (s/p recent PCI) who presented with left groin swelling/pain, found to have hyperglycemia.      Problem List/Main Diagnoses (system-based):   #Uncontrolled type 2 diabetes mellitus with hyperglycemia.   Pt with known IDDM, presented w/ abd pain, N/V and found to have glucose >600 and AG on admission. A1c 16.4% (1/1/24). Pt admitted to MICU for insulin gtt + IVF. Pt then stabilized and stepped down to RMF. Endocrinology consulted  - Please continue NPH insulin 72 units every 8 hours (8AM, 4PM, 12AM).  - continue lispro 40 units three times daily before meals.      #Intertrigo labialis.   Pt with known h/o L labial abscess (s/p drainage last admission 8/2023) now presented w/ worsening groin redness, physical exam c/w intertrigo. CT A/P w/o abscess or fluid collection. BCx NGTD. ID was consulted given complex infectious history. Pt was placed briefly on Ertapenem and Linezolid. Nystatin powder BID and fluconazole 150 mg PO q-weekly x4 weeks for rash for Intertrigo   - C/w Nystatin powder BID   - C/w fluconazole 150 mg PO q-weekly x4 weeks     #Frequent UTI.   Pt with known h/o frequent UTIs 2/2 suprapubic cath (removed 6/2023), recent admission for klebsiella and E coli UTI. Also has chronic dysuria. UCx 7/2023 +MSSA and E. faecalis, s/p linezolid course. UA w/ WBCs >24. UCx 12/31: +MSSA s/p Ertapenem 1 g IV qd and Linezolid 600 mg PO q12h       #Hypertension.   Pt with known HTN takes Metoprolol succinate ER 50 mg qd at home.  - continue home med     # Colostomy.   #h/o abdominal necrotizing fasciitis   #colostomy hernia  Pt presented w/ groin pain, abdominal pain and N/V. Was hospitalized at [facility] 8/2023 for L groin abscess and purulent cellulitis. Known h/o abdominal necrotizing fascitis s/p ostomy placement. Has pain around ostomy site. CT A/P 12/31: no fluid collection or evidence of necrotizing fasciitis of L groin or L mid upper thigh. Surgery consulted and recommended no intervention.    #History of pulmonary embolism.   Pt with known h/o b/l PEs in segmental and subsegmental branches on R and subsegmental branches on L, diagnosed on CT chest during ED visit 4/7/23. Takes Eliquis 5 mg BID at home.  - continue home Eliquis.    #CAD (coronary artery disease).   Pt with know history of CAD s/p multiple stents, takes Plavix 75 mg qd and Lipitor 80 mg qhs at home.   - [facility] 06/26/23: NAT x 1 pRCA (85%) and NAT x 1 RPLS (80%); residual dLCx/OM1 70%, mLAD 50%. staged PCI 08/01/2023 NAT x 1 mLCx/OM1 (70%), RCA: patent stents, LM: short segment, MLI, mLAD: 40%.  TTE 06/23/23: Mild LVH, hyperdynamic LV systolic function, normal RV sise and systolic function  s/p staged PCI 8/2023  Admission EKG 12/31/23 w/ sinus tachycardia  - continue home plavix and lipitor.    Patient was discharged to: home    New medications:   - C/w Nystatin powder BID   - C/w fluconazole 150 mg PO q-weekly x4 weeks     Items to follow up as outpatient:  Please follow up with Dr. Joseph at [facility] by calling [phone] to make an appointment.     Physical exam at the time of discharge:   PHYSICAL EXAM:  Constitutional: obese adult female lying in bed NAD  HEENT: NC/AT, EOMI, MMM  Neck: Supple  Respiratory: CTAB but difficult to auscultate 2/2 body habitus  Cardiovascular: RRR, normal S1 and S2, no murmurs appreciated  Gastrointestinal: +BS, tender to palpation around colostomy site which is c/d/i and without erythema or drainage  Extremities: WWP, no peripheral edema  Vascular: 2+ radial and DP pulses b/l  Neurological: AAOx3, moving all extremities spontaneously but with discomfort  Skin: erythema and dry flaky skin in groin, TTP; patient reports erythema and skin flaking    LABS & STUDIES:  SARS-CoV-2: Adonay (31 Dec 2023 12:46)   HASMUKH AGUILERA is a 43y Female with a past medical history of asthma, CVA (residual L>R weakness), PE (on Eliquis), multiple abdominal hernia repeairs, DMII, HTN, HLD, abdominal necrotizing fasciitis (s/p ostomy), frequent UTIs, CAD (s/p recent PCI) who presented with left groin swelling/pain, found to have hyperglycemia.      Problem List/Main Diagnoses (system-based):   #Uncontrolled type 2 diabetes mellitus with hyperglycemia.   Pt with known IDDM, presented w/ abd pain, N/V and found to have glucose >600 and AG on admission. A1c 16.4% (1/1/24). Pt admitted to MICU for insulin gtt + IVF. Pt then stabilized and stepped down to RMF. Endocrinology consulted  - Please continue NPH insulin 72 units every 8 hours (8AM, 4PM, 12AM).  - continue lispro 40 units three times daily before meals.      #Intertrigo labialis.   Pt with known h/o L labial abscess (s/p drainage last admission 8/2023) now presented w/ worsening groin redness, physical exam c/w intertrigo. CT A/P w/o abscess or fluid collection. BCx NGTD. ID was consulted given complex infectious history. Pt was placed briefly on Ertapenem and Linezolid. Nystatin powder BID and fluconazole 150 mg PO q-weekly x4 weeks for rash for Intertrigo   - C/w Nystatin powder BID   - C/w fluconazole 150 mg PO q-weekly x4 weeks     #Frequent UTI.   Pt with known h/o frequent UTIs 2/2 suprapubic cath (removed 6/2023), recent admission for klebsiella and E coli UTI. Also has chronic dysuria. UCx 7/2023 +MSSA and E. faecalis, s/p linezolid course. UA w/ WBCs >24. UCx 12/31: +MSSA s/p Ertapenem 1 g IV qd and Linezolid 600 mg PO q12h       #Hypertension.   Pt with known HTN takes Metoprolol succinate ER 50 mg qd at home.  - continue home med     # Colostomy.   #h/o abdominal necrotizing fasciitis   #colostomy hernia  Pt presented w/ groin pain, abdominal pain and N/V. Was hospitalized at [facility] 8/2023 for L groin abscess and purulent cellulitis. Known h/o abdominal necrotizing fascitis s/p ostomy placement. Has pain around ostomy site. CT A/P 12/31: no fluid collection or evidence of necrotizing fasciitis of L groin or L mid upper thigh. Surgery consulted and recommended no intervention.    #History of pulmonary embolism.   Pt with known h/o b/l PEs in segmental and subsegmental branches on R and subsegmental branches on L, diagnosed on CT chest during ED visit 4/7/23. Takes Eliquis 5 mg BID at home.  - continue home Eliquis.    #CAD (coronary artery disease).   Pt with know history of CAD s/p multiple stents, takes Plavix 75 mg qd and Lipitor 80 mg qhs at home.   - [facility] 06/26/23: NAT x 1 pRCA (85%) and NAT x 1 RPLS (80%); residual dLCx/OM1 70%, mLAD 50%. staged PCI 08/01/2023 NAT x 1 mLCx/OM1 (70%), RCA: patent stents, LM: short segment, MLI, mLAD: 40%.  TTE 06/23/23: Mild LVH, hyperdynamic LV systolic function, normal RV sise and systolic function  s/p staged PCI 8/2023  Admission EKG 12/31/23 w/ sinus tachycardia  - continue home plavix and lipitor.    Patient was discharged to: home    New medications:   - C/w Nystatin powder BID   - C/w fluconazole 150 mg PO q-weekly x4 weeks     Items to follow up as outpatient:  Please follow up with Dr. Joseph at [facility] by calling [phone] to make an appointment.     Physical exam at the time of discharge:   PHYSICAL EXAM:  Constitutional: obese adult female lying in bed NAD  HEENT: NC/AT, EOMI, MMM  Neck: Supple  Respiratory: CTAB but difficult to auscultate 2/2 body habitus  Cardiovascular: RRR, normal S1 and S2, no murmurs appreciated  Gastrointestinal: +BS, tender to palpation around colostomy site which is c/d/i and without erythema or drainage  Extremities: WWP, no peripheral edema  Vascular: 2+ radial and DP pulses b/l  Neurological: AAOx3, moving all extremities spontaneously but with discomfort  Skin: erythema and dry flaky skin in groin, TTP; patient reports erythema and skin flaking    LABS & STUDIES:  SARS-CoV-2: Adonay (31 Dec 2023 12:46)   HASMUKH AGUILERA is a 43y Female with a past medical history of asthma, CVA (residual L>R weakness), PE (on Eliquis), multiple abdominal hernia repeairs, DMII, HTN, HLD, abdominal necrotizing fasciitis (s/p ostomy), frequent UTIs, CAD (s/p recent PCI) who presented with left groin swelling/pain, found to have hyperglycemia.      Problem List/Main Diagnoses (system-based)  #Uncontrolled type 2 diabetes mellitus with hyperglycemia.   Pt with known IDDM, presented w/ abd pain, N/V and found to have glucose >600 and AG on admission. A1c 16.4% (1/1/24). Pt admitted to MICU for insulin gtt + IVF. Pt then stabilized and stepped down to RMF. Endocrinology consulted  - Please continue NPH insulin 72 units every 8 hours (8AM, 4PM, 12AM).  - continue lispro 40 units three times daily before meals.      #Intertrigo labialis.   Pt with known h/o L labial abscess (s/p drainage last admission 8/2023) now presented w/ worsening groin redness, physical exam c/w intertrigo. CT A/P w/o abscess or fluid collection. BCx NGTD. ID was consulted given complex infectious history. Pt was placed briefly on Ertapenem and Linezolid. Nystatin powder BID and fluconazole 150 mg PO q-weekly x4 weeks for rash for Intertrigo   - C/w Nystatin powder BID   - C/w fluconazole 150 mg PO q-weekly x4 weeks     #Frequent UTI.   Pt with known h/o frequent UTIs 2/2 suprapubic cath (removed 6/2023), recent admission for klebsiella and E coli UTI. Also has chronic dysuria. UCx 7/2023 +MSSA and E. faecalis, s/p linezolid course. UA w/ WBCs >24. UCx 12/31: +MSSA s/p Ertapenem 1 g IV qd and Linezolid 600 mg PO q12h       #Hypertension.   Pt with known HTN takes Metoprolol succinate ER 50 mg qd at home.  - continue home med     # Colostomy.   #h/o abdominal necrotizing fasciitis   #colostomy hernia  Pt presented w/ groin pain, abdominal pain and N/V. Was hospitalized at [facility] 8/2023 for L groin abscess and purulent cellulitis. Known h/o abdominal necrotizing fascitis s/p ostomy placement. Has pain around ostomy site. CT A/P 12/31: no fluid collection or evidence of necrotizing fasciitis of L groin or L mid upper thigh. Surgery consulted and recommended no intervention.    #History of pulmonary embolism.   Pt with known h/o b/l PEs in segmental and subsegmental branches on R and subsegmental branches on L, diagnosed on CT chest during ED visit 4/7/23. Takes Eliquis 5 mg BID at home.  - continue home Eliquis.    #CAD (coronary artery disease).   Pt with know history of CAD s/p multiple stents, takes Plavix 75 mg qd and Lipitor 80 mg qhs at home.   - [facility] 06/26/23: NAT x 1 pRCA (85%) and NAT x 1 RPLS (80%); residual dLCx/OM1 70%, mLAD 50%. staged PCI 08/01/2023 NAT x 1 mLCx/OM1 (70%), RCA: patent stents, LM: short segment, MLI, mLAD: 40%.  TTE 06/23/23: Mild LVH, hyperdynamic LV systolic function, normal RV sise and systolic function  s/p staged PCI 8/2023  Admission EKG 12/31/23 w/ sinus tachycardia  - continue home plavix and lipitor.    Patient was discharged to: home    New medications:   - C/w Nystatin powder BID   - C/w fluconazole 150 mg PO q-weekly x4 weeks     Items to follow up as outpatient:  Please follow up with Dr. Joseph at [facility] by calling [phone] to make an appointment.     Physical exam at the time of discharge:   PHYSICAL EXAM:  Constitutional: obese adult female lying in bed NAD  HEENT: NC/AT, EOMI, MMM  Neck: Supple  Respiratory: CTAB but difficult to auscultate 2/2 body habitus  Cardiovascular: RRR, normal S1 and S2, no murmurs appreciated  Gastrointestinal: +BS, tender to palpation around colostomy site which is c/d/i and without erythema or drainage  Extremities: WWP, no peripheral edema  Vascular: 2+ radial and DP pulses b/l  Neurological: AAOx3, moving all extremities spontaneously but with discomfort  Skin: erythema and dry flaky skin in groin, TTP; patient reports erythema and skin flaking    LABS & STUDIES:  SARS-CoV-2: Adonay (31 Dec 2023 12:46)   HASMUKH AGUILERA is a 43y Female with a past medical history of asthma, CVA (residual L>R weakness), PE (on Eliquis), multiple abdominal hernia repeairs, DMII, HTN, HLD, abdominal necrotizing fasciitis (s/p ostomy), frequent UTIs, CAD (s/p recent PCI) who presented with left groin swelling/pain, found to have hyperglycemia.      Problem List/Main Diagnoses (system-based)  #Uncontrolled type 2 diabetes mellitus with hyperglycemia.   Pt with known IDDM, presented w/ abd pain, N/V and found to have glucose >600 and AG on admission. A1c 16.4% (1/1/24). Pt admitted to MICU for insulin gtt + IVF. Pt then stabilized and stepped down to RMF. Endocrinology consulted  - Please continue NPH insulin 72 units every 8 hours (8AM, 4PM, 12AM).  - continue lispro 40 units three times daily before meals.      #Intertrigo labialis.   Pt with known h/o L labial abscess (s/p drainage last admission 8/2023) now presented w/ worsening groin redness, physical exam c/w intertrigo. CT A/P w/o abscess or fluid collection. BCx NGTD. ID was consulted given complex infectious history. Pt was placed briefly on Ertapenem and Linezolid. Nystatin powder BID and fluconazole 150 mg PO q-weekly x4 weeks for rash for Intertrigo   - C/w Nystatin powder BID   - C/w fluconazole 150 mg PO q-weekly x4 weeks     #Frequent UTI.   Pt with known h/o frequent UTIs 2/2 suprapubic cath (removed 6/2023), recent admission for klebsiella and E coli UTI. Also has chronic dysuria. UCx 7/2023 +MSSA and E. faecalis, s/p linezolid course. UA w/ WBCs >24. UCx 12/31: +MSSA s/p Ertapenem 1 g IV qd and Linezolid 600 mg PO q12h       #Hypertension.   Pt with known HTN takes Metoprolol succinate ER 50 mg qd at home.  - continue home med     # Colostomy.   #h/o abdominal necrotizing fasciitis   #colostomy hernia  Pt presented w/ groin pain, abdominal pain and N/V. Was hospitalized at [facility] 8/2023 for L groin abscess and purulent cellulitis. Known h/o abdominal necrotizing fascitis s/p ostomy placement. Has pain around ostomy site. CT A/P 12/31: no fluid collection or evidence of necrotizing fasciitis of L groin or L mid upper thigh. Surgery consulted and recommended no intervention.    #History of pulmonary embolism.   Pt with known h/o b/l PEs in segmental and subsegmental branches on R and subsegmental branches on L, diagnosed on CT chest during ED visit 4/7/23. Takes Eliquis 5 mg BID at home.  - continue home Eliquis.    #CAD (coronary artery disease).   Pt with know history of CAD s/p multiple stents, takes Plavix 75 mg qd and Lipitor 80 mg qhs at home.   - [facility] 06/26/23: NAT x 1 pRCA (85%) and NAT x 1 RPLS (80%); residual dLCx/OM1 70%, mLAD 50%. staged PCI 08/01/2023 NAT x 1 mLCx/OM1 (70%), RCA: patent stents, LM: short segment, MLI, mLAD: 40%.  TTE 06/23/23: Mild LVH, hyperdynamic LV systolic function, normal RV sise and systolic function  s/p staged PCI 8/2023  Admission EKG 12/31/23 w/ sinus tachycardia  - continue home plavix and lipitor.    Patient was discharged to: home    New medications:   - C/w Nystatin powder BID   - C/w fluconazole 150 mg PO q-weekly x4 weeks     Items to follow up as outpatient:  Please follow up with Dr. Joseph at [facility] by calling [phone] to make an appointment.     Physical exam at the time of discharge:   PHYSICAL EXAM:  Constitutional: obese adult female lying in bed NAD  HEENT: NC/AT, EOMI, MMM  Neck: Supple  Respiratory: CTAB but difficult to auscultate 2/2 body habitus  Cardiovascular: RRR, normal S1 and S2, no murmurs appreciated  Gastrointestinal: +BS, tender to palpation around colostomy site which is c/d/i and without erythema or drainage  Extremities: WWP, no peripheral edema  Vascular: 2+ radial and DP pulses b/l  Neurological: AAOx3, moving all extremities spontaneously but with discomfort  Skin: erythema and dry flaky skin in groin, TTP; patient reports erythema and skin flaking    LABS & STUDIES:  SARS-CoV-2: Adonay (31 Dec 2023 12:46)   HASMUKH AGUILERA is a 43y Female with a past medical history of asthma, CVA (residual L>R weakness), PE (on Eliquis), multiple abdominal hernia repeairs, DMII, HTN, HLD, abdominal necrotizing fasciitis (s/p ostomy), frequent UTIs, CAD (s/p recent PCI) who presented with left groin swelling/pain, found to have hyperglycemia.      Problem List/Main Diagnoses (system-based)   #Uncontrolled type 2 diabetes mellitus with hyperglycemia.   Pt with known IDDM, presented w/ abd pain, N/V and found to have glucose >600 and AG on admission. A1c 16.4% (1/1/24). Pt admitted to MICU for insulin gtt + IVF. Pt then stabilized and stepped down to RMF. Endocrinology consulted  - Please continue NPH insulin 72 units every 8 hours (8AM, 4PM, 12AM).  - continue lispro 40 units three times daily before meals.      #Intertrigo labialis.   Pt with known h/o L labial abscess (s/p drainage last admission 8/2023) now presented w/ worsening groin redness, physical exam c/w intertrigo. CT A/P w/o abscess or fluid collection. BCx NGTD. ID was consulted given complex infectious history. Pt was placed briefly on Ertapenem and Linezolid. Nystatin powder BID and fluconazole 150 mg PO q-weekly x4 weeks for rash for Intertrigo   - C/w Nystatin powder BID   - C/w fluconazole 150 mg PO q-weekly x4 weeks     #Frequent UTI.   Pt with known h/o frequent UTIs 2/2 suprapubic cath (removed 6/2023), recent admission for klebsiella and E coli UTI. Also has chronic dysuria. UCx 7/2023 +MSSA and E. faecalis, s/p linezolid course. UA w/ WBCs >24. UCx 12/31: +MSSA s/p Ertapenem 1 g IV qd and Linezolid 600 mg PO q12h       #Hypertension.   Pt with known HTN takes Metoprolol succinate ER 50 mg qd at home.  - continue home med     # Colostomy.   #h/o abdominal necrotizing fasciitis   #colostomy hernia  Pt presented w/ groin pain, abdominal pain and N/V. Was hospitalized at [facility] 8/2023 for L groin abscess and purulent cellulitis. Known h/o abdominal necrotizing fascitis s/p ostomy placement. Has pain around ostomy site. CT A/P 12/31: no fluid collection or evidence of necrotizing fasciitis of L groin or L mid upper thigh. Surgery consulted and recommended no intervention.    #History of pulmonary embolism.   Pt with known h/o b/l PEs in segmental and subsegmental branches on R and subsegmental branches on L, diagnosed on CT chest during ED visit 4/7/23. Takes Eliquis 5 mg BID at home.  - continue home Eliquis.    #CAD (coronary artery disease).   Pt with know history of CAD s/p multiple stents, takes Plavix 75 mg qd and Lipitor 80 mg qhs at home.   - [facility] 06/26/23: NAT x 1 pRCA (85%) and NAT x 1 RPLS (80%); residual dLCx/OM1 70%, mLAD 50%. staged PCI 08/01/2023 NAT x 1 mLCx/OM1 (70%), RCA: patent stents, LM: short segment, MLI, mLAD: 40%.  TTE 06/23/23: Mild LVH, hyperdynamic LV systolic function, normal RV sise and systolic function  s/p staged PCI 8/2023  Admission EKG 12/31/23 w/ sinus tachycardia  - continue home plavix and lipitor.    Patient was discharged to: home    New medications:   - C/w Nystatin powder BID   - C/w fluconazole 150 mg PO q-weekly x4 weeks     Items to follow up as outpatient:  Please follow up with Dr. Joseph at [facility] by calling [phone] to make an appointment.     Physical exam at the time of discharge:   PHYSICAL EXAM:  Constitutional: obese adult female lying in bed NAD  HEENT: NC/AT, EOMI, MMM  Neck: Supple  Respiratory: CTAB but difficult to auscultate 2/2 body habitus  Cardiovascular: RRR, normal S1 and S2, no murmurs appreciated  Gastrointestinal: +BS, tender to palpation around colostomy site which is c/d/i and without erythema or drainage  Extremities: WWP, no peripheral edema  Vascular: 2+ radial and DP pulses b/l  Neurological: AAOx3, moving all extremities spontaneously but with discomfort  Skin: erythema and dry flaky skin in groin, TTP; patient reports erythema and skin flaking    LABS & STUDIES:  SARS-CoV-2: Adonay (31 Dec 2023 12:46)   HASMUKH AGUILERA is a 43y Female with a past medical history of asthma, CVA (residual L>R weakness), PE (on Eliquis), multiple abdominal hernia repeairs, DMII, HTN, HLD, abdominal necrotizing fasciitis (s/p ostomy), frequent UTIs, CAD (s/p recent PCI) who presented with left groin swelling/pain, found to have hyperglycemia.      Problem List/Main Diagnoses (system-based)   #Uncontrolled type 2 diabetes mellitus with hyperglycemia.   Pt with known IDDM, presented w/ abd pain, N/V and found to have glucose >600 and AG on admission. A1c 16.4% (1/1/24). Pt admitted to MICU for insulin gtt + IVF. Pt then stabilized and stepped down to RMF. Endocrinology consulted  - Please continue NPH insulin 72 units every 8 hours (8AM, 4PM, 12AM).  - continue lispro 40 units three times daily before meals.      #Intertrigo labialis.   Pt with known h/o L labial abscess (s/p drainage last admission 8/2023) now presented w/ worsening groin redness, physical exam c/w intertrigo. CT A/P w/o abscess or fluid collection. BCx NGTD. ID was consulted given complex infectious history. Pt was placed briefly on Ertapenem and Linezolid. Nystatin powder BID and fluconazole 150 mg PO q-weekly x4 weeks for rash for Intertrigo   - C/w Nystatin powder BID   - C/w fluconazole 150 mg PO q-weekly x4 weeks     #Frequent UTI.   Pt with known h/o frequent UTIs 2/2 suprapubic cath (removed 6/2023), recent admission for klebsiella and E coli UTI. Also has chronic dysuria. UCx 7/2023 +MSSA and E. faecalis, s/p linezolid course. UA w/ WBCs >24. UCx 12/31: +MSSA s/p Ertapenem 1 g IV qd and Linezolid 600 mg PO q12h       #Hypertension.   Pt with known HTN takes Metoprolol succinate ER 50 mg qd at home.  - continue home med     # Colostomy.   #h/o abdominal necrotizing fasciitis   #colostomy hernia  Pt presented w/ groin pain, abdominal pain and N/V. Was hospitalized at [facility] 8/2023 for L groin abscess and purulent cellulitis. Known h/o abdominal necrotizing fascitis s/p ostomy placement. Has pain around ostomy site. CT A/P 12/31: no fluid collection or evidence of necrotizing fasciitis of L groin or L mid upper thigh. Surgery consulted and recommended no intervention.    #History of pulmonary embolism.   Pt with known h/o b/l PEs in segmental and subsegmental branches on R and subsegmental branches on L, diagnosed on CT chest during ED visit 4/7/23. Takes Eliquis 5 mg BID at home.  - continue home Eliquis.    #CAD (coronary artery disease).   Pt with know history of CAD s/p multiple stents, takes Plavix 75 mg qd and Lipitor 80 mg qhs at home.   - [facility] 06/26/23: NAT x 1 pRCA (85%) and NAT x 1 RPLS (80%); residual dLCx/OM1 70%, mLAD 50%. staged PCI 08/01/2023 NAT x 1 mLCx/OM1 (70%), RCA: patent stents, LM: short segment, MLI, mLAD: 40%.  TTE 06/23/23: Mild LVH, hyperdynamic LV systolic function, normal RV sise and systolic function  s/p staged PCI 8/2023  Admission EKG 12/31/23 w/ sinus tachycardia  - continue home plavix and lipitor.    Patient was discharged to: home    New medications:   - C/w Nystatin powder BID   - C/w fluconazole 150 mg PO q-weekly x4 weeks     Items to follow up as outpatient:  Please follow up with Dr. Joseph at [facility] by calling [phone] to make an appointment.     Physical exam at the time of discharge:   PHYSICAL EXAM:  Constitutional: obese adult female lying in bed NAD  HEENT: NC/AT, EOMI, MMM  Neck: Supple  Respiratory: CTAB but difficult to auscultate 2/2 body habitus  Cardiovascular: RRR, normal S1 and S2, no murmurs appreciated  Gastrointestinal: +BS, tender to palpation around colostomy site which is c/d/i and without erythema or drainage  Extremities: WWP, no peripheral edema  Vascular: 2+ radial and DP pulses b/l  Neurological: AAOx3, moving all extremities spontaneously but with discomfort  Skin: erythema and dry flaky skin in groin, TTP; patient reports erythema and skin flaking    LABS & STUDIES:  SARS-CoV-2: Adonay (31 Dec 2023 12:46)   HASMUKH AGUILERA is a 43y Female with a past medical history of asthma, CVA (residual L>R weakness), PE (on Eliquis), multiple abdominal hernia repeairs, DMII, HTN, HLD, abdominal necrotizing fasciitis (s/p ostomy), frequent UTIs, CAD (s/p recent PCI) who presented with left groin swelling/pain, found to have hyperglycemia.      Problem List/Main Diagnoses (system-based)   #Uncontrolled type 2 diabetes mellitus with hyperglycemia.   Pt with known IDDM, presented w/ abd pain, N/V and found to have glucose >600 and AG on admission. A1c 16.4% (1/1/24). Pt admitted to MICU for insulin gtt + IVF. Pt then stabilized and stepped down to RMF. Endocrinology consulted  - Please continue NPH insulin 72 units every 8 hours (8AM, 4PM, 12AM).  - continue lispro 40 units three times daily before meals.      #Intertrigo labialis.   Pt with known h/o L labial abscess (s/p drainage last admission 8/2023) now presented w/ worsening groin redness, physical exam c/w intertrigo. CT A/P w/o abscess or fluid collection. BCx NGTD. ID was consulted given complex infectious history. Pt was placed briefly on Ertapenem and Linezolid.   - c/w Clotrinazole 2% cream vaginal external twice daily for 7 days   - C/w Nystatin powder BID for 14 days   - C/w fluconazole 400 mg PO daily for 14 days     #Frequent UTI.   Pt with known h/o frequent UTIs 2/2 suprapubic cath (removed 6/2023), recent admission for klebsiella and E coli UTI. Also has chronic dysuria. UCx 7/2023 +MSSA and E. faecalis, s/p linezolid course. UA w/ WBCs >24. UCx 12/31: +MSSA s/p Ertapenem 1 g IV qd and Linezolid 600 mg PO q12h       #Hypertension.   Pt with known HTN takes Metoprolol succinate ER 50 mg qd at home.  - continue home med     # Colostomy.   #h/o abdominal necrotizing fasciitis   #colostomy hernia  Pt presented w/ groin pain, abdominal pain and N/V. Was hospitalized at [facility] 8/2023 for L groin abscess and purulent cellulitis. Known h/o abdominal necrotizing fascitis s/p ostomy placement. Has pain around ostomy site. CT A/P 12/31: no fluid collection or evidence of necrotizing fasciitis of L groin or L mid upper thigh. Surgery consulted and recommended no intervention.    #History of pulmonary embolism.   Pt with known h/o b/l PEs in segmental and subsegmental branches on R and subsegmental branches on L, diagnosed on CT chest during ED visit 4/7/23. Takes Eliquis 5 mg BID at home.  - continue home Eliquis.    #CAD (coronary artery disease).   Pt with know history of CAD s/p multiple stents, takes Plavix 75 mg qd and Lipitor 80 mg qhs at home.   - [facility] 06/26/23: NAT x 1 pRCA (85%) and NAT x 1 RPLS (80%); residual dLCx/OM1 70%, mLAD 50%. staged PCI 08/01/2023 NAT x 1 mLCx/OM1 (70%), RCA: patent stents, LM: short segment, MLI, mLAD: 40%.  TTE 06/23/23: Mild LVH, hyperdynamic LV systolic function, normal RV sise and systolic function  s/p staged PCI 8/2023  Admission EKG 12/31/23 w/ sinus tachycardia  - continue home plavix and lipitor.    Patient was discharged to: home    New medications:   - c/w Clotrinazole 2% cream vaginal external twice daily for 7 days   - C/w Nystatin powder BID for 14 days   - C/w fluconazole 400 mg PO daily for 14 days     Items to follow up as outpatient:  Please follow up with Dr. Joseph at [facility] by calling [phone] to make an appointment.     Physical exam at the time of discharge:   PHYSICAL EXAM:  Constitutional: obese adult female lying in bed NAD  HEENT: NC/AT, EOMI, MMM  Neck: Supple  Respiratory: CTAB but difficult to auscultate 2/2 body habitus  Cardiovascular: RRR, normal S1 and S2, no murmurs appreciated  Gastrointestinal: +BS, tender to palpation around colostomy site which is c/d/i and without erythema or drainage  Extremities: WWP, no peripheral edema  Vascular: 2+ radial and DP pulses b/l  Neurological: AAOx3, moving all extremities spontaneously but with discomfort  Skin: erythema and dry flaky skin in groin, TTP; patient reports erythema and skin flaking    LABS & STUDIES:  SARS-CoV-2: Adonay (31 Dec 2023 12:46)   HASMUKH AGUILERA is a 43y Female with a past medical history of asthma, CVA (residual L>R weakness), PE (on Eliquis), multiple abdominal hernia repeairs, DMII, HTN, HLD, abdominal necrotizing fasciitis (s/p ostomy), frequent UTIs, CAD (s/p recent PCI) who presented with left groin swelling/pain, found to have hyperglycemia.      Problem List/Main Diagnoses (system-based)   #Uncontrolled type 2 diabetes mellitus with hyperglycemia.   Pt with known IDDM, presented w/ abd pain, N/V and found to have glucose >600 and AG on admission. A1c 16.4% (1/1/24). Pt admitted to MICU for insulin gtt + IVF. Pt then stabilized and stepped down to RMF. Endocrinology consulted  - Please continue NPH insulin 72 units every 8 hours (8AM, 4PM, 12AM).  - continue lispro 40 units three times daily before meals.      #Intertrigo labialis.   Pt with known h/o L labial abscess (s/p drainage last admission 8/2023) now presented w/ worsening groin redness, physical exam c/w intertrigo. CT A/P w/o abscess or fluid collection. BCx NGTD. ID was consulted given complex infectious history. Pt was placed briefly on Ertapenem and Linezolid.   - c/w Clotrinazole 2% cream vaginal external twice daily for 7 days   - C/w Nystatin powder BID for 14 days   - C/w fluconazole 400 mg PO daily for 14 days     #Frequent UTI.   Pt with known h/o frequent UTIs 2/2 suprapubic cath (removed 6/2023), recent admission for klebsiella and E coli UTI. Also has chronic dysuria. UCx 7/2023 +MSSA and E. faecalis, s/p linezolid course. UA w/ WBCs >24. UCx 12/31: +MSSA s/p Ertapenem 1 g IV qd and Linezolid 600 mg PO q12h       #Hypertension.   Pt with known HTN takes Metoprolol succinate ER 50 mg qd at home.  - continue home med     # Colostomy.   #h/o abdominal necrotizing fasciitis   #colostomy hernia  Pt presented w/ groin pain, abdominal pain and N/V. Was hospitalized at [facility] 8/2023 for L groin abscess and purulent cellulitis. Known h/o abdominal necrotizing fascitis s/p ostomy placement. Has pain around ostomy site. CT A/P 12/31: no fluid collection or evidence of necrotizing fasciitis of L groin or L mid upper thigh. Surgery consulted and recommended no intervention.    #History of pulmonary embolism.   Pt with known h/o b/l PEs in segmental and subsegmental branches on R and subsegmental branches on L, diagnosed on CT chest during ED visit 4/7/23. Takes Eliquis 5 mg BID at home.  - continue home Eliquis.    #CAD (coronary artery disease).   Pt with know history of CAD s/p multiple stents, takes Plavix 75 mg qd and Lipitor 80 mg qhs at home.   - [facility] 06/26/23: NAT x 1 pRCA (85%) and NAT x 1 RPLS (80%); residual dLCx/OM1 70%, mLAD 50%. staged PCI 08/01/2023 NAT x 1 mLCx/OM1 (70%), RCA: patent stents, LM: short segment, MLI, mLAD: 40%.  TTE 06/23/23: Mild LVH, hyperdynamic LV systolic function, normal RV sise and systolic function  s/p staged PCI 8/2023  Admission EKG 12/31/23 w/ sinus tachycardia  - continue home plavix and lipitor.    Patient was discharged to: home    New medications:   - c/w Clotrinazole 2% cream vaginal external twice daily for 7 days   - C/w Nystatin powder BID for 14 days   - C/w fluconazole 400 mg PO daily for 14 days     Items to follow up as outpatient:  Please follow up with Dr. Joseph at [facility] by calling [phone] to make an appointment.     Physical exam at the time of discharge:   PHYSICAL EXAM:  Constitutional: obese adult female lying in bed NAD  HEENT: NC/AT, EOMI, MMM  Neck: Supple  Respiratory: CTAB but difficult to auscultate 2/2 body habitus  Cardiovascular: RRR, normal S1 and S2, no murmurs appreciated  Gastrointestinal: +BS, tender to palpation around colostomy site which is c/d/i and without erythema or drainage  Extremities: WWP, no peripheral edema  Vascular: 2+ radial and DP pulses b/l  Neurological: AAOx3, moving all extremities spontaneously but with discomfort  Skin: erythema and dry flaky skin in groin, TTP; patient reports erythema and skin flaking    LABS & STUDIES:  SARS-CoV-2: Adonay (31 Dec 2023 12:46)   HASMUKH AGUILERA is a 43y Female with a past medical history of asthma, CVA (residual L>R weakness), PE (on Eliquis), multiple abdominal hernia repeairs, DMII, HTN, HLD, abdominal necrotizing fasciitis (s/p ostomy), frequent UTIs, CAD (s/p recent PCI) who presented with left groin swelling/pain, found to have hyperglycemia.      Problem List/Main Diagnoses (system-based)   #Uncontrolled type 2 diabetes mellitus with hyperglycemia.   Pt with known IDDM, presented w/ abd pain, N/V and found to have glucose >600 and AG on admission. A1c 16.4% (1/1/24). Pt admitted to MICU for insulin gtt + IVF. Pt then stabilized and stepped down to RMF. Endocrinology consulted  - Please continue NPH insulin 72 units every 8 hours (8AM, 4PM, 12AM).  - continue lispro 40 units three times daily before meals.      #Intertrigo labialis.   Pt with known h/o L labial abscess (s/p drainage last admission 8/2023) now presented w/ worsening groin redness, physical exam c/w intertrigo. CT A/P w/o abscess or fluid collection. BCx NGTD. ID was consulted given complex infectious history. Pt was placed briefly on Ertapenem and Linezolid.   - c/w Clotrinazole 2% cream vaginal external twice daily for 7 days   - C/w Nystatin powder BID to groin affected area for 14 days   - C/w fluconazole 400 mg PO daily for 14 days     #Frequent UTI.   Pt with known h/o frequent UTIs 2/2 suprapubic cath (removed 6/2023), recent admission for klebsiella and E coli UTI. Also has chronic dysuria. UCx 7/2023 +MSSA and E. faecalis, s/p linezolid course. UA w/ WBCs >24. UCx 12/31: +MSSA s/p Ertapenem 1 g IV qd and Linezolid 600 mg PO q12h       #Hypertension.   Pt with known HTN takes Metoprolol succinate ER 50 mg qd at home.  - continue home med     # Colostomy.   #h/o abdominal necrotizing fasciitis   #colostomy hernia  Pt presented w/ groin pain, abdominal pain and N/V. Was hospitalized at [facility] 8/2023 for L groin abscess and purulent cellulitis. Known h/o abdominal necrotizing fascitis s/p ostomy placement. Has pain around ostomy site. CT A/P 12/31: no fluid collection or evidence of necrotizing fasciitis of L groin or L mid upper thigh. Surgery consulted and recommended no intervention.    #History of pulmonary embolism.   Pt with known h/o b/l PEs in segmental and subsegmental branches on R and subsegmental branches on L, diagnosed on CT chest during ED visit 4/7/23. Takes Eliquis 5 mg BID at home.  - continue home Eliquis.    #CAD (coronary artery disease).   Pt with know history of CAD s/p multiple stents, takes Plavix 75 mg qd and Lipitor 80 mg qhs at home.   - [facility] 06/26/23: NAT x 1 pRCA (85%) and NAT x 1 RPLS (80%); residual dLCx/OM1 70%, mLAD 50%. staged PCI 08/01/2023 NAT x 1 mLCx/OM1 (70%), RCA: patent stents, LM: short segment, MLI, mLAD: 40%.  TTE 06/23/23: Mild LVH, hyperdynamic LV systolic function, normal RV sise and systolic function  s/p staged PCI 8/2023  Admission EKG 12/31/23 w/ sinus tachycardia  - continue home plavix and lipitor.    Patient was discharged to: home    New medications:   - c/w Clotrinazole 2% cream vaginal external twice daily for 7 days   - C/w Nystatin powder BID for 14 days   - C/w fluconazole 400 mg PO daily for 14 days     Items to follow up as outpatient:  Please follow up with Dr. Joseph at [facility] by calling [phone] to make an appointment.     Physical exam at the time of discharge:   PHYSICAL EXAM:  Constitutional: obese adult female lying in bed NAD  HEENT: NC/AT, EOMI, MMM  Neck: Supple  Respiratory: CTAB but difficult to auscultate 2/2 body habitus  Cardiovascular: RRR, normal S1 and S2, no murmurs appreciated  Gastrointestinal: +BS, tender to palpation around colostomy site which is c/d/i and without erythema or drainage  Extremities: WWP, no peripheral edema  Vascular: 2+ radial and DP pulses b/l  Neurological: AAOx3, moving all extremities spontaneously but with discomfort  Skin: erythema and dry flaky skin in groin, TTP; patient reports erythema and skin flaking    LABS & STUDIES:  SARS-CoV-2: Adonay (31 Dec 2023 12:46)   HASMUKH AGUILERA is a 43y Female with a past medical history of asthma, CVA (residual L>R weakness), PE (on Eliquis), multiple abdominal hernia repeairs, DMII, HTN, HLD, abdominal necrotizing fasciitis (s/p ostomy), frequent UTIs, CAD (s/p recent PCI) who presented with left groin swelling/pain, found to have hyperglycemia.      Problem List/Main Diagnoses (system-based)   #Uncontrolled type 2 diabetes mellitus with hyperglycemia.   Pt with known IDDM, presented w/ abd pain, N/V and found to have glucose >600 and AG on admission. A1c 16.4% (1/1/24). Pt admitted to MICU for insulin gtt + IVF. Pt then stabilized and stepped down to RMF. Endocrinology consulted  - Please continue NPH insulin 72 units every 8 hours (8AM, 4PM, 12AM).  - continue lispro 40 units three times daily before meals.      #Intertrigo labialis.   Pt with known h/o L labial abscess (s/p drainage last admission 8/2023) now presented w/ worsening groin redness, physical exam c/w intertrigo. CT A/P w/o abscess or fluid collection. BCx NGTD. ID was consulted given complex infectious history. Pt was placed briefly on Ertapenem and Linezolid.   - c/w Clotrinazole 2% cream vaginal external twice daily for 7 days   - C/w Nystatin powder BID to groin affected area for 14 days   - C/w fluconazole 400 mg PO daily for 14 days     #Frequent UTI.   Pt with known h/o frequent UTIs 2/2 suprapubic cath (removed 6/2023), recent admission for klebsiella and E coli UTI. Also has chronic dysuria. UCx 7/2023 +MSSA and E. faecalis, s/p linezolid course. UA w/ WBCs >24. UCx 12/31: +MSSA s/p Ertapenem 1 g IV qd and Linezolid 600 mg PO q12h       #Hypertension.   Pt with known HTN takes Metoprolol succinate ER 50 mg qd at home.  - continue home med     # Colostomy.   #h/o abdominal necrotizing fasciitis   #colostomy hernia  Pt presented w/ groin pain, abdominal pain and N/V. Was hospitalized at [facility] 8/2023 for L groin abscess and purulent cellulitis. Known h/o abdominal necrotizing fascitis s/p ostomy placement. Has pain around ostomy site. CT A/P 12/31: no fluid collection or evidence of necrotizing fasciitis of L groin or L mid upper thigh. Surgery consulted and recommended no intervention.    #History of pulmonary embolism.   Pt with known h/o b/l PEs in segmental and subsegmental branches on R and subsegmental branches on L, diagnosed on CT chest during ED visit 4/7/23. Takes Eliquis 5 mg BID at home.  - continue home Eliquis.    #CAD (coronary artery disease).   Pt with know history of CAD s/p multiple stents, takes Plavix 75 mg qd and Lipitor 80 mg qhs at home.   - [facility] 06/26/23: NAT x 1 pRCA (85%) and NAT x 1 RPLS (80%); residual dLCx/OM1 70%, mLAD 50%. staged PCI 08/01/2023 NAT x 1 mLCx/OM1 (70%), RCA: patent stents, LM: short segment, MLI, mLAD: 40%.  TTE 06/23/23: Mild LVH, hyperdynamic LV systolic function, normal RV sise and systolic function  s/p staged PCI 8/2023  Admission EKG 12/31/23 w/ sinus tachycardia  - continue home plavix and lipitor.    Patient was discharged to: home    New medications:   - c/w Clotrinazole 2% cream vaginal external twice daily for 7 days   - C/w Nystatin powder BID for 14 days   - C/w fluconazole 400 mg PO daily for 14 days     Items to follow up as outpatient:  Please follow up with Dr. Joseph at [facility] by calling [phone] to make an appointment.     Physical exam at the time of discharge:   PHYSICAL EXAM:  Constitutional: obese adult female lying in bed NAD  HEENT: NC/AT, EOMI, MMM  Neck: Supple  Respiratory: CTAB but difficult to auscultate 2/2 body habitus  Cardiovascular: RRR, normal S1 and S2, no murmurs appreciated  Gastrointestinal: +BS, tender to palpation around colostomy site which is c/d/i and without erythema or drainage  Extremities: WWP, no peripheral edema  Vascular: 2+ radial and DP pulses b/l  Neurological: AAOx3, moving all extremities spontaneously but with discomfort  Skin: erythema and dry flaky skin in groin, TTP; patient reports erythema and skin flaking    LABS & STUDIES:  SARS-CoV-2: Adonay (31 Dec 2023 12:46)   HASMUKH AGUILERA is a 43y Female with a past medical history of asthma, CVA (residual L>R weakness), PE (on Eliquis), multiple abdominal hernia repeairs, DMII, HTN, HLD, abdominal necrotizing fasciitis (s/p ostomy), frequent UTIs, CAD (s/p recent PCI) who presented with left groin swelling/pain, found to have hyperglycemia.      Problem List/Main Diagnoses (system-based)   #Uncontrolled type 2 diabetes mellitus with hyperglycemia.   Pt with known IDDM, presented w/ abd pain, N/V and found to have glucose >600 and AG on admission. A1c 16.4% (1/1/24). Pt admitted to MICU for insulin gtt + IVF. Pt then stabilized and stepped down to RMF. Endocrinology consulted  - Please continue Humulin insulin 70 units every 8 hours (8AM, 4PM, 12AM).  - continue Admelog 40 units three times daily before meals.      #Intertrigo labialis.   Pt with known h/o L labial abscess (s/p drainage last admission 8/2023) now presented w/ worsening groin redness, physical exam c/w intertrigo. CT A/P w/o abscess or fluid collection. BCx NGTD. ID was consulted given complex infectious history. Pt was placed briefly on Ertapenem and Linezolid.   - c/w Clotrinazole 2% cream vaginal external twice daily for 7 days   - C/w Nystatin powder BID to groin affected area for 14 days   - C/w fluconazole 400 mg PO daily for 14 days     #Frequent UTI.   Pt with known h/o frequent UTIs 2/2 suprapubic cath (removed 6/2023), recent admission for klebsiella and E coli UTI. Also has chronic dysuria. UCx 7/2023 +MSSA and E. faecalis, s/p linezolid course. UA w/ WBCs >24. UCx 12/31: +MSSA s/p Ertapenem 1 g IV qd and Linezolid 600 mg PO q12h       #Hypertension.   Pt with known HTN takes Metoprolol succinate ER 50 mg qd at home.  - continue home med     # Colostomy.   #h/o abdominal necrotizing fasciitis   #colostomy hernia  Pt presented w/ groin pain, abdominal pain and N/V. Was hospitalized at [facility] 8/2023 for L groin abscess and purulent cellulitis. Known h/o abdominal necrotizing fascitis s/p ostomy placement. Has pain around ostomy site. CT A/P 12/31: no fluid collection or evidence of necrotizing fasciitis of L groin or L mid upper thigh. Surgery consulted and recommended no intervention.    #History of pulmonary embolism.   Pt with known h/o b/l PEs in segmental and subsegmental branches on R and subsegmental branches on L, diagnosed on CT chest during ED visit 4/7/23. Takes Eliquis 5 mg BID at home.  - continue home Eliquis.    #CAD (coronary artery disease).   Pt with know history of CAD s/p multiple stents, takes Plavix 75 mg qd and Lipitor 80 mg qhs at home.   - [facility] 06/26/23: NAT x 1 pRCA (85%) and NAT x 1 RPLS (80%); residual dLCx/OM1 70%, mLAD 50%. staged PCI 08/01/2023 NAT x 1 mLCx/OM1 (70%), RCA: patent stents, LM: short segment, MLI, mLAD: 40%.  TTE 06/23/23: Mild LVH, hyperdynamic LV systolic function, normal RV sise and systolic function  s/p staged PCI 8/2023  Admission EKG 12/31/23 w/ sinus tachycardia  - continue home plavix and lipitor.    Patient was discharged to: home    New medications:   - c/w Clotrinazole 2% cream vaginal external twice daily for 7 days   - C/w Nystatin powder BID for 14 days   - C/w fluconazole 400 mg PO daily for 14 days     Items to follow up as outpatient:  Please follow up with Dr. Joseph at [facility] by calling [phone] to make an appointment.     Physical exam at the time of discharge:   PHYSICAL EXAM:  Constitutional: obese adult female lying in bed NAD  HEENT: NC/AT, EOMI, MMM  Neck: Supple  Respiratory: CTAB but difficult to auscultate 2/2 body habitus  Cardiovascular: RRR, normal S1 and S2, no murmurs appreciated  Gastrointestinal: +BS, tender to palpation around colostomy site which is c/d/i and without erythema or drainage  Extremities: WWP, no peripheral edema  Vascular: 2+ radial and DP pulses b/l  Neurological: AAOx3, moving all extremities spontaneously but with discomfort  Skin: erythema and dry flaky skin in groin, TTP; patient reports erythema and skin flaking    LABS & STUDIES:  SARS-CoV-2: Adonay (31 Dec 2023 12:46)   HASMUKH AGUILERA is a 43y Female with a past medical history of asthma, CVA (residual L>R weakness), PE (on Eliquis), multiple abdominal hernia repeairs, DMII, HTN, HLD, abdominal necrotizing fasciitis (s/p ostomy), frequent UTIs, CAD (s/p recent PCI) who presented with left groin swelling/pain, found to have hyperglycemia.      Problem List/Main Diagnoses (system-based)   #Uncontrolled type 2 diabetes mellitus with hyperglycemia.   Pt with known IDDM, presented w/ abd pain, N/V and found to have glucose >600 and AG on admission. A1c 16.4% (1/1/24). Pt admitted to MICU for insulin gtt + IVF. Pt then stabilized and stepped down to RMF. Endocrinology consulted  - Please continue Humulin insulin 70 units every 8 hours (8AM, 4PM, 12AM).  - continue Admelog 40 units three times daily before meals.      #Intertrigo labialis.   Pt with known h/o L labial abscess (s/p drainage last admission 8/2023) now presented w/ worsening groin redness, physical exam c/w intertrigo. CT A/P w/o abscess or fluid collection. BCx NGTD. ID was consulted given complex infectious history. Pt was placed briefly on Ertapenem and Linezolid.   - c/w Clotrinazole 2% cream vaginal external twice daily for 7 days   - C/w Nystatin powder BID to groin affected area for 14 days   - C/w fluconazole 400 mg PO daily for 14 days     #Frequent UTI.   Pt with known h/o frequent UTIs 2/2 suprapubic cath (removed 6/2023), recent admission for klebsiella and E coli UTI. Also has chronic dysuria. UCx 7/2023 +MSSA and E. faecalis, s/p linezolid course. UA w/ WBCs >24. UCx 12/31: +MSSA s/p Ertapenem 1 g IV qd and Linezolid 600 mg PO q12h       #Hypertension.   Pt with known HTN takes Metoprolol succinate ER 50 mg qd at home.  - continue home med     # Colostomy.   #h/o abdominal necrotizing fasciitis   #colostomy hernia  Pt presented w/ groin pain, abdominal pain and N/V. Was hospitalized at [facility] 8/2023 for L groin abscess and purulent cellulitis. Known h/o abdominal necrotizing fascitis s/p ostomy placement. Has pain around ostomy site. CT A/P 12/31: no fluid collection or evidence of necrotizing fasciitis of L groin or L mid upper thigh. Surgery consulted and recommended no intervention.    #History of pulmonary embolism.   Pt with known h/o b/l PEs in segmental and subsegmental branches on R and subsegmental branches on L, diagnosed on CT chest during ED visit 4/7/23. Takes Eliquis 5 mg BID at home.  - continue home Eliquis.    #CAD (coronary artery disease).   Pt with know history of CAD s/p multiple stents, takes Plavix 75 mg qd and Lipitor 80 mg qhs at home.   - [facility] 06/26/23: NAT x 1 pRCA (85%) and NAT x 1 RPLS (80%); residual dLCx/OM1 70%, mLAD 50%. staged PCI 08/01/2023 NAT x 1 mLCx/OM1 (70%), RCA: patent stents, LM: short segment, MLI, mLAD: 40%.  TTE 06/23/23: Mild LVH, hyperdynamic LV systolic function, normal RV sise and systolic function  s/p staged PCI 8/2023  Admission EKG 12/31/23 w/ sinus tachycardia  - continue home plavix and lipitor.    Patient was discharged to: home    New medications:   - c/w Clotrinazole 2% cream vaginal external twice daily for 7 days   - C/w Nystatin powder BID for 14 days   - C/w fluconazole 400 mg PO daily for 14 days     Items to follow up as outpatient:  Please follow up with Dr. Joseph at [facility] by calling [phone] to make an appointment.     Physical exam at the time of discharge:   PHYSICAL EXAM:  Constitutional: obese adult female lying in bed NAD  HEENT: NC/AT, EOMI, MMM  Neck: Supple  Respiratory: CTAB but difficult to auscultate 2/2 body habitus  Cardiovascular: RRR, normal S1 and S2, no murmurs appreciated  Gastrointestinal: +BS, tender to palpation around colostomy site which is c/d/i and without erythema or drainage  Extremities: WWP, no peripheral edema  Vascular: 2+ radial and DP pulses b/l  Neurological: AAOx3, moving all extremities spontaneously but with discomfort  Skin: erythema and dry flaky skin in groin, TTP; patient reports erythema and skin flaking    LABS & STUDIES:  SARS-CoV-2: Adonay (31 Dec 2023 12:46)

## 2024-01-03 NOTE — PROGRESS NOTE ADULT - ASSESSMENT
Ms. Katz is a 43-year-old female with a past medical history of uncontrolled type 2 diabetes mellitus, hypertension, hyperlipidemia, CVA (11/2021 with left sided residual weakness), pulmonary embolism (4/2023, on eliquis), coronary artery disease (PCI s/p NAT 7/2027 and 8/2027), history of abdominal necrotizing fasciitis (ostomy placement in 11/2021), multiple abdominal hernia repairs, recurrent UTI and previous admission for left labial abscess (8/2023) who presented to the emergency department complaining of left groin swelling that progressively increased over the previous 3 weeks being treated for intertrigo with low suspicion of necrotizing fascitis.    A1C: 16.4 %  BUN: 15  Creatinine: 0.85  GFR: 87  Weight: 99.8

## 2024-01-03 NOTE — DISCHARGE NOTE NURSING/CASE MANAGEMENT/SOCIAL WORK - NSDCVIVACCINE_GEN_ALL_CORE_FT
Tdap; 25-Apr-2019 12:41; Neena Chavez (ARMANDO); [employer]; [id number] (Exp. Date: 02-Nov-2020); IntraMuscular; Deltoid Right.; 0.5 milliLiter(s); VIS (VIS Published: 09-May-2013, VIS Presented: 25-Apr-2019);    Tdap; 25-Apr-2019 12:41; Neena Chavez (ARMANDO); [employer]; [id number] (Exp. Date: 02-Nov-2020); IntraMuscular; Deltoid Right.; 0.5 milliLiter(s); VIS (VIS Published: 09-May-2013, VIS Presented: 25-Apr-2019);

## 2024-01-03 NOTE — DISCHARGE NOTE PROVIDER - NSDCFUADDAPPT_GEN_ALL_CORE_FT
Please follow up with Dr. Joseph at [facility] by calling [phone] to make an appointment.    Please follow up with Dr. Joseph at [facility] by calling [phone] to make an appointment.

## 2024-01-03 NOTE — DISCHARGE NOTE NURSING/CASE MANAGEMENT/SOCIAL WORK - NSDCPEFALRISK_GEN_ALL_CORE
For information on Fall & Injury Prevention, visit: [email or web address] OR  [email or web address] OR  https://www.cdc.gov/steadi/patient.html For information on Fall & Injury Prevention, visit: [email or web address] OR  [email or web address] OR  https://www.cdc.gov/steadi/patient.html

## 2024-01-03 NOTE — PROGRESS NOTE ADULT - ATTENDING COMMENTS
43-year-old female with a PMHx of asthma, CVA (residual L>R weakness), PE (on Eliquis), multiple abdominal hernia repeairs, DMII, HTN, HLD, abdominal necrotizing fasciitis (s/p ostomy), frequent UTIs, CAD (s/p recent PCI) who presented with left groin swelling/pain, found to have hyperglycemia for which she was admitted to the MICU for insulin gtt, now stable for transfer to [facility].     #Poorly Controlled DMII (A1c 16.4%)   -s/p MICU admission for insulin gtt, blood glucose now controlled and stable on RMF   -endocrine consulted, currently on NPH 72 units q8hrs, Lispro 40 units with meals and personalized ISS    -follow up discharge recommendations with endocrine     #Intertrigo   #Groin Erythema, Edema and Pain   -ID consulted, continue with Clotrimazole, Fluconazole, Linezolid and Ertapenem    -follow up final discharge recommendations if able    #MSSA Bacteruria   -ID consulted, feel this is colonization but on ABx as above     DVT PPx: Eliquis     Dispo: home 43-year-old female with a PMHx of asthma, CVA (residual L>R weakness), PE (on Eliquis), multiple abdominal hernia repeairs, DMII, HTN, HLD, abdominal necrotizing fasciitis (s/p ostomy), frequent UTIs, CAD (s/p recent PCI) who presented with left groin swelling/pain, found to have hyperglycemia for which she was admitted to the MICU for insulin gtt, now stable for transfer to [facility].     #Poorly Controlled DMII (A1c 16.4%)   -s/p MICU admission for insulin gtt, blood glucose now controlled and stable on RMF   -endocrine consulted, currently on NPH 72 units q8hrs, Lispro 40 units with meals and personalized ISS    -follow up discharge recommendations with endocrine     #Intertrigo   #Groin Erythema, Edema and Pain   -ID consulted, continue with Clotrimazole, Fluconazole, Linezolid and Ertapenem    -follow up final discharge recommendations if able    #MSSA Bacteruria   -ID consulted, feel this is colonization but on ABx as above     DVT PPx: Eliquis     Dispo: home

## 2024-01-03 NOTE — DISCHARGE NOTE NURSING/CASE MANAGEMENT/SOCIAL WORK - PATIENT PORTAL LINK FT
You can access the FollowMyHealth Patient Portal offered by [facility] by registering at the following website: [email or web address]. By joining [employer]’s FollowMyHealth portal, you will also be able to view your health information using other applications (apps) compatible with our system. You can access the FollowMyHealth Patient Portal offered by [facility] by registering at the following website: [email or web address]. By joining [employer]’s FollowMyHealth portal, you will also be able to view your health information using other applications (apps) compatible with our system.

## 2024-01-03 NOTE — DISCHARGE NOTE PROVIDER - NSDCHHHOMEBOUND_GEN_ALL_CORE
87 right handed female with history of colitis and TIA (MRI 09/2022 negative) presented to the hospital with 20 minutes and 7 minutes of confusion. She was talking to her son when she had an episode where she was asking her son to repeat what he was saying and repeating her words "what are you talking about?" Son noted some associated blank staring episodes.  This episode occurred for twenty minutes including the time that the EMS came. During the episode, she was not able to identify person nor place. She had another episode on the EMS ride on the way the hospital which improved by the time she got to the hospital. CODE STROKE called 1949. NIHSS 1. mRS 3. The patient received CT and CTA which demonstrated no acute hemorrhage nor mass effect. CTA noted a fusiform hemorrhage in the LMCA. Upon speaking with the patient's family, it was revealed that the patient has had multiple episodes of intermittent staring spells usually lasing one to two minutes. However, this current episode was much longer than before, prompting the hospital transfer.  Not candidate for tPA given no focal symptoms and diagnosis less likely stroke but more likely seizure. Not candidate for thrombectomy because no LVO.     Of note, the patient had a previous episode of one to two minutes staring episodes where she presented to outpatient Neurologist afterwards, who recommended MRI for TIA workup which was negative.  
Fall risk

## 2024-01-03 NOTE — PROGRESS NOTE ADULT - PROBLEM SELECTOR PLAN 5
#h/o abdominal necrotizing fasciitis   #colostomy hernia  Presented w/ groin pain, abdominal pain and N/V. Was hospitalized at [facility] 8/2023 for L groin abscess and purulent cellulitis. Known h/o abdominal necrotizing fascitis s/p ostomy placement. Has pain around ostomy site.  - CT A/P 12/31: no fluid collection or evidence of necrotizing fasciitis of L groin or L mid upper thigh  - consulted surgery, rec'd no intervention #h/o abdominal necrotizing fasciitis   #colostomy hernia  Presented w/ groin pain, abdominal pain and N/V. Was hospitalized at [facility] 8/2023 for L groin abscess and purulent cellulitis. Known h/o abdominal necrotizing fascitis s/p ostomy placement. Has pain around ostomy site.  - CT A/P 12/31: no fluid collection or evidence of necrotizing fasciitis of L groin or L mid upper thigh  - consulted surgery, rec'd no intervention

## 2024-01-03 NOTE — PROGRESS NOTE ADULT - NS ATTEND AMEND GEN_ALL_CORE FT
THis patient has resistant Diabetes mellitus,handled as outpatient with U500 Insulin, She was admitted with glucose level in 400's. She has a left groin swelling. Solumedrol was given for contrast allergy prior to imaging. On NPH Insulin 65 units every 8 hours glucose levels were 241-359 last night and today 184-232. I agree with increasing NPH Insulin to 72 units every 8 hours and pre-meal 40 units Lispro Insulin.
This patient admitted for groin lump,S/P history of abdominal necrotizing fascitis and labial surgery has obtained good glucose control with glucoses 140-116 yesterday and today 110-142. Despite outpatinet treatment with U500 insulin she is alright on 72 units NPH Insulin every8 hours and 35 units Lispro Insulin before meals

## 2024-01-03 NOTE — DISCHARGE NOTE NURSING/CASE MANAGEMENT/SOCIAL WORK - CAREGIVER ADDRESS
[address], [place] [address] [address], [place] [address]

## 2024-01-03 NOTE — PROGRESS NOTE ADULT - ASSESSMENT
Overall patient has never manifested systemic signs of infection. The redness affecting her groin has been present and mostly stable for >1 month, and this tempo of disease is not consistent with bacterial, chan likely intertrigo. Never had fever/leukocytosis and CRP not impressively elevated. There are no signs of necrotizing fasciitis or bacterial cellulitis on physical exam by me or surgical team, or on CT. However, she does remain at elevated risk for SSTI/NSTI with uncontrolled DM and hx of NSTI.    # Groin redness - likely intertrigo  - Continue nystatin powder per wound care  - Continue fluconazole 150mg PO weekly x 4 weeks  - Recommended skin scraping for KOH prep and fungal culture but patient declined this despite extensive counseling.  - Discontinue linezolid and ertapenem and monitor closely for any worsening local or systemic signs of infection.  - Counseled patient on the importance of controlling diabetes in order to treat this intertrigo and prevent future bacterial skin infections. Gave strict return precautions for any worsening local symptoms of pain, redness, swelling, or any systemic symptoms of fever/chills/sweats.    # MSSA bacteriuria - likely colonization  - Patient has had chronic MSSA bacteriuria likely related to prior suprapubic cath (now removed). Never had bacteremia with this, and current BCx are no growth to date. Patient has no systemic signs of infection, and no evidence of bone/joint/heart metastatic MSSA infection on exam. She has no urinary symptoms, and CT without any urinary tract abnormalities. Monitor off antibiotics.    ID Team 2 will follow

## 2024-01-04 LAB
ALBUMIN SERPL ELPH-MCNC: 2.4 G/DL — LOW (ref 3.3–5)
ALBUMIN SERPL ELPH-MCNC: 2.4 G/DL — LOW (ref 3.3–5)
ALP SERPL-CCNC: 134 U/L — HIGH (ref 40–120)
ALP SERPL-CCNC: 134 U/L — HIGH (ref 40–120)
ALT FLD-CCNC: 15 U/L — SIGNIFICANT CHANGE UP (ref 10–45)
ALT FLD-CCNC: 15 U/L — SIGNIFICANT CHANGE UP (ref 10–45)
ANION GAP SERPL CALC-SCNC: 7 MMOL/L — SIGNIFICANT CHANGE UP (ref 5–17)
ANION GAP SERPL CALC-SCNC: 7 MMOL/L — SIGNIFICANT CHANGE UP (ref 5–17)
AST SERPL-CCNC: 20 U/L — SIGNIFICANT CHANGE UP (ref 10–40)
AST SERPL-CCNC: 20 U/L — SIGNIFICANT CHANGE UP (ref 10–40)
BASOPHILS # BLD AUTO: 0.02 K/UL — SIGNIFICANT CHANGE UP (ref 0–0.2)
BASOPHILS # BLD AUTO: 0.02 K/UL — SIGNIFICANT CHANGE UP (ref 0–0.2)
BASOPHILS NFR BLD AUTO: 0.2 % — SIGNIFICANT CHANGE UP (ref 0–2)
BASOPHILS NFR BLD AUTO: 0.2 % — SIGNIFICANT CHANGE UP (ref 0–2)
BILIRUB SERPL-MCNC: <0.2 MG/DL — SIGNIFICANT CHANGE UP (ref 0.2–1.2)
BILIRUB SERPL-MCNC: <0.2 MG/DL — SIGNIFICANT CHANGE UP (ref 0.2–1.2)
BUN SERPL-MCNC: 17 MG/DL — SIGNIFICANT CHANGE UP (ref 7–23)
BUN SERPL-MCNC: 17 MG/DL — SIGNIFICANT CHANGE UP (ref 7–23)
CALCIUM SERPL-MCNC: 8.4 MG/DL — SIGNIFICANT CHANGE UP (ref 8.4–10.5)
CALCIUM SERPL-MCNC: 8.4 MG/DL — SIGNIFICANT CHANGE UP (ref 8.4–10.5)
CHLORIDE SERPL-SCNC: 102 MMOL/L — SIGNIFICANT CHANGE UP (ref 96–108)
CHLORIDE SERPL-SCNC: 102 MMOL/L — SIGNIFICANT CHANGE UP (ref 96–108)
CO2 SERPL-SCNC: 23 MMOL/L — SIGNIFICANT CHANGE UP (ref 22–31)
CO2 SERPL-SCNC: 23 MMOL/L — SIGNIFICANT CHANGE UP (ref 22–31)
CREAT SERPL-MCNC: 0.85 MG/DL — SIGNIFICANT CHANGE UP (ref 0.5–1.3)
CREAT SERPL-MCNC: 0.85 MG/DL — SIGNIFICANT CHANGE UP (ref 0.5–1.3)
EGFR: 87 ML/MIN/1.73M2 — SIGNIFICANT CHANGE UP
EGFR: 87 ML/MIN/1.73M2 — SIGNIFICANT CHANGE UP
EOSINOPHIL # BLD AUTO: 0.12 K/UL — SIGNIFICANT CHANGE UP (ref 0–0.5)
EOSINOPHIL # BLD AUTO: 0.12 K/UL — SIGNIFICANT CHANGE UP (ref 0–0.5)
EOSINOPHIL NFR BLD AUTO: 1.4 % — SIGNIFICANT CHANGE UP (ref 0–6)
EOSINOPHIL NFR BLD AUTO: 1.4 % — SIGNIFICANT CHANGE UP (ref 0–6)
GLUCOSE BLDC GLUCOMTR-MCNC: 179 MG/DL — HIGH (ref 70–99)
GLUCOSE BLDC GLUCOMTR-MCNC: 179 MG/DL — HIGH (ref 70–99)
GLUCOSE BLDC GLUCOMTR-MCNC: 182 MG/DL — HIGH (ref 70–99)
GLUCOSE BLDC GLUCOMTR-MCNC: 182 MG/DL — HIGH (ref 70–99)
GLUCOSE BLDC GLUCOMTR-MCNC: 195 MG/DL — HIGH (ref 70–99)
GLUCOSE BLDC GLUCOMTR-MCNC: 195 MG/DL — HIGH (ref 70–99)
GLUCOSE BLDC GLUCOMTR-MCNC: 206 MG/DL — HIGH (ref 70–99)
GLUCOSE BLDC GLUCOMTR-MCNC: 206 MG/DL — HIGH (ref 70–99)
GLUCOSE SERPL-MCNC: 247 MG/DL — HIGH (ref 70–99)
GLUCOSE SERPL-MCNC: 247 MG/DL — HIGH (ref 70–99)
HCT VFR BLD CALC: 36.3 % — SIGNIFICANT CHANGE UP (ref 34.5–45)
HCT VFR BLD CALC: 36.3 % — SIGNIFICANT CHANGE UP (ref 34.5–45)
HGB BLD-MCNC: 10.9 G/DL — LOW (ref 11.5–15.5)
HGB BLD-MCNC: 10.9 G/DL — LOW (ref 11.5–15.5)
IMM GRANULOCYTES NFR BLD AUTO: 0.6 % — SIGNIFICANT CHANGE UP (ref 0–0.9)
IMM GRANULOCYTES NFR BLD AUTO: 0.6 % — SIGNIFICANT CHANGE UP (ref 0–0.9)
LYMPHOCYTES # BLD AUTO: 2.53 K/UL — SIGNIFICANT CHANGE UP (ref 1–3.3)
LYMPHOCYTES # BLD AUTO: 2.53 K/UL — SIGNIFICANT CHANGE UP (ref 1–3.3)
LYMPHOCYTES # BLD AUTO: 30.4 % — SIGNIFICANT CHANGE UP (ref 13–44)
LYMPHOCYTES # BLD AUTO: 30.4 % — SIGNIFICANT CHANGE UP (ref 13–44)
MAGNESIUM SERPL-MCNC: 1.6 MG/DL — SIGNIFICANT CHANGE UP (ref 1.6–2.6)
MAGNESIUM SERPL-MCNC: 1.6 MG/DL — SIGNIFICANT CHANGE UP (ref 1.6–2.6)
MCHC RBC-ENTMCNC: 23.9 PG — LOW (ref 27–34)
MCHC RBC-ENTMCNC: 23.9 PG — LOW (ref 27–34)
MCHC RBC-ENTMCNC: 30 GM/DL — LOW (ref 32–36)
MCHC RBC-ENTMCNC: 30 GM/DL — LOW (ref 32–36)
MCV RBC AUTO: 79.6 FL — LOW (ref 80–100)
MCV RBC AUTO: 79.6 FL — LOW (ref 80–100)
MONOCYTES # BLD AUTO: 0.6 K/UL — SIGNIFICANT CHANGE UP (ref 0–0.9)
MONOCYTES # BLD AUTO: 0.6 K/UL — SIGNIFICANT CHANGE UP (ref 0–0.9)
MONOCYTES NFR BLD AUTO: 7.2 % — SIGNIFICANT CHANGE UP (ref 2–14)
MONOCYTES NFR BLD AUTO: 7.2 % — SIGNIFICANT CHANGE UP (ref 2–14)
NEUTROPHILS # BLD AUTO: 5 K/UL — SIGNIFICANT CHANGE UP (ref 1.8–7.4)
NEUTROPHILS # BLD AUTO: 5 K/UL — SIGNIFICANT CHANGE UP (ref 1.8–7.4)
NEUTROPHILS NFR BLD AUTO: 60.2 % — SIGNIFICANT CHANGE UP (ref 43–77)
NEUTROPHILS NFR BLD AUTO: 60.2 % — SIGNIFICANT CHANGE UP (ref 43–77)
NRBC # BLD: 0 /100 WBCS — SIGNIFICANT CHANGE UP (ref 0–0)
NRBC # BLD: 0 /100 WBCS — SIGNIFICANT CHANGE UP (ref 0–0)
PHOSPHATE SERPL-MCNC: 3.6 MG/DL — SIGNIFICANT CHANGE UP (ref 2.5–4.5)
PHOSPHATE SERPL-MCNC: 3.6 MG/DL — SIGNIFICANT CHANGE UP (ref 2.5–4.5)
PLATELET # BLD AUTO: 364 K/UL — SIGNIFICANT CHANGE UP (ref 150–400)
PLATELET # BLD AUTO: 364 K/UL — SIGNIFICANT CHANGE UP (ref 150–400)
POTASSIUM SERPL-MCNC: 4.4 MMOL/L — SIGNIFICANT CHANGE UP (ref 3.5–5.3)
POTASSIUM SERPL-MCNC: 4.4 MMOL/L — SIGNIFICANT CHANGE UP (ref 3.5–5.3)
POTASSIUM SERPL-SCNC: 4.4 MMOL/L — SIGNIFICANT CHANGE UP (ref 3.5–5.3)
POTASSIUM SERPL-SCNC: 4.4 MMOL/L — SIGNIFICANT CHANGE UP (ref 3.5–5.3)
PROT SERPL-MCNC: 5.8 G/DL — LOW (ref 6–8.3)
PROT SERPL-MCNC: 5.8 G/DL — LOW (ref 6–8.3)
RBC # BLD: 4.56 M/UL — SIGNIFICANT CHANGE UP (ref 3.8–5.2)
RBC # BLD: 4.56 M/UL — SIGNIFICANT CHANGE UP (ref 3.8–5.2)
RBC # FLD: 16.8 % — HIGH (ref 10.3–14.5)
RBC # FLD: 16.8 % — HIGH (ref 10.3–14.5)
SODIUM SERPL-SCNC: 132 MMOL/L — LOW (ref 135–145)
SODIUM SERPL-SCNC: 132 MMOL/L — LOW (ref 135–145)
WBC # BLD: 8.32 K/UL — SIGNIFICANT CHANGE UP (ref 3.8–10.5)
WBC # BLD: 8.32 K/UL — SIGNIFICANT CHANGE UP (ref 3.8–10.5)
WBC # FLD AUTO: 8.32 K/UL — SIGNIFICANT CHANGE UP (ref 3.8–10.5)
WBC # FLD AUTO: 8.32 K/UL — SIGNIFICANT CHANGE UP (ref 3.8–10.5)

## 2024-01-04 PROCEDURE — 99233 SBSQ HOSP IP/OBS HIGH 50: CPT | Mod: GC

## 2024-01-04 PROCEDURE — 99232 SBSQ HOSP IP/OBS MODERATE 35: CPT

## 2024-01-04 RX ORDER — NYSTATIN CREAM 100000 [USP'U]/G
1 CREAM TOPICAL
Qty: 1 | Refills: 0
Start: 2024-01-04 | End: 2024-01-17

## 2024-01-04 RX ORDER — METFORMIN HYDROCHLORIDE 850 MG/1
2 TABLET ORAL
Qty: 60 | Refills: 0
Start: 2024-01-04 | End: 2024-02-02

## 2024-01-04 RX ORDER — INSULIN LISPRO 100/ML
40 VIAL (ML) SUBCUTANEOUS
Qty: 4 | Refills: 0
Start: 2024-01-04 | End: 2024-02-02

## 2024-01-04 RX ORDER — HUMAN INSULIN 100 [IU]/ML
72 INJECTION, SUSPENSION SUBCUTANEOUS
Qty: 1 | Refills: 0
Start: 2024-01-04 | End: 2024-02-02

## 2024-01-04 RX ORDER — ALBUTEROL 90 UG/1
1 AEROSOL, METERED ORAL
Qty: 2 | Refills: 0
Start: 2024-01-04 | End: 2024-02-02

## 2024-01-04 RX ORDER — FLUCONAZOLE 150 MG/1
2 TABLET ORAL
Qty: 28 | Refills: 0
Start: 2024-01-04 | End: 2024-01-17

## 2024-01-04 RX ORDER — INSULIN LISPRO 100/ML
35 VIAL (ML) SUBCUTANEOUS
Qty: 1 | Refills: 0
Start: 2024-01-04 | End: 2024-02-02

## 2024-01-04 RX ORDER — INSULIN HUMAN 100 [IU]/ML
70 INJECTION, SOLUTION SUBCUTANEOUS
Qty: 30 | Refills: 0
Start: 2024-01-04 | End: 2024-02-02

## 2024-01-04 RX ORDER — FLUCONAZOLE 150 MG/1
1 TABLET ORAL
Qty: 4 | Refills: 0
Start: 2024-01-04 | End: 2024-01-31

## 2024-01-04 RX ADMIN — OXYCODONE HYDROCHLORIDE 10 MILLIGRAM(S): 5 TABLET ORAL at 07:14

## 2024-01-04 RX ADMIN — Medication 3: at 13:19

## 2024-01-04 RX ADMIN — Medication 6: at 09:19

## 2024-01-04 RX ADMIN — Medication 40 UNIT(S): at 13:19

## 2024-01-04 RX ADMIN — APIXABAN 5 MILLIGRAM(S): 2.5 TABLET, FILM COATED ORAL at 07:12

## 2024-01-04 RX ADMIN — Medication 50 MILLIGRAM(S): at 07:12

## 2024-01-04 RX ADMIN — OXYCODONE HYDROCHLORIDE 10 MILLIGRAM(S): 5 TABLET ORAL at 17:19

## 2024-01-04 RX ADMIN — ATORVASTATIN CALCIUM 80 MILLIGRAM(S): 80 TABLET, FILM COATED ORAL at 22:43

## 2024-01-04 RX ADMIN — NYSTATIN CREAM 1 APPLICATION(S): 100000 CREAM TOPICAL at 18:08

## 2024-01-04 RX ADMIN — APIXABAN 5 MILLIGRAM(S): 2.5 TABLET, FILM COATED ORAL at 16:36

## 2024-01-04 RX ADMIN — OXYCODONE HYDROCHLORIDE 10 MILLIGRAM(S): 5 TABLET ORAL at 22:43

## 2024-01-04 RX ADMIN — HUMAN INSULIN 72 UNIT(S): 100 INJECTION, SUSPENSION SUBCUTANEOUS at 18:32

## 2024-01-04 RX ADMIN — Medication 3: at 22:44

## 2024-01-04 RX ADMIN — OXYCODONE HYDROCHLORIDE 10 MILLIGRAM(S): 5 TABLET ORAL at 18:19

## 2024-01-04 RX ADMIN — HUMAN INSULIN 72 UNIT(S): 100 INJECTION, SUSPENSION SUBCUTANEOUS at 10:05

## 2024-01-04 RX ADMIN — Medication 40 UNIT(S): at 10:05

## 2024-01-04 RX ADMIN — NYSTATIN CREAM 1 APPLICATION(S): 100000 CREAM TOPICAL at 07:11

## 2024-01-04 RX ADMIN — OXYCODONE HYDROCHLORIDE 10 MILLIGRAM(S): 5 TABLET ORAL at 12:46

## 2024-01-04 RX ADMIN — Medication 40 UNIT(S): at 18:33

## 2024-01-04 RX ADMIN — OXYCODONE HYDROCHLORIDE 10 MILLIGRAM(S): 5 TABLET ORAL at 13:46

## 2024-01-04 RX ADMIN — PANTOPRAZOLE SODIUM 40 MILLIGRAM(S): 20 TABLET, DELAYED RELEASE ORAL at 10:06

## 2024-01-04 RX ADMIN — CLOPIDOGREL BISULFATE 75 MILLIGRAM(S): 75 TABLET, FILM COATED ORAL at 12:46

## 2024-01-04 RX ADMIN — Medication 3: at 18:33

## 2024-01-04 NOTE — PROGRESS NOTE ADULT - PROBLEM SELECTOR PLAN 4
Known, takes Metoprolol succinate ER 50 mg qd at home.  - continue metoprolol

## 2024-01-04 NOTE — DISCHARGE NOTE PROVIDER - NSDCQMPCI_CARD_ALL_CORE
FOLLOW UP ORTHOPAEDIC NOTE    The patient follows up today for reevaluation of left knee. The patient states 2/10 pain.  She received her MRI left knee and presents today to discuss treatment options correlating to exam findings and imaging and her subjective symptoms.  She states in general that she has been doing better however she does feel the knee occasionally gives way with feelings of instability.     PE:  AAOx3  RR  Unlabored breathing  Skin warm and moist  Focused physical examination of the left knee  Nontender to palpation joint line, 0/110 degrees active range of motion, 2+ anterior drawer, negative posterior drawer, guarded Lachman.  Stable to varus and valgus at 0 and 30 degrees    Pertinent radiographs/imaging:  MRI left knee 12/29/2023      MY READ: Complete ACL tear with pivot shift bone contusions lateral compartment.  Anterior tibial translation passive.  MCL intact.  No gross medial or lateral meniscus tear.  Posterior medial meniscal root intact and posterior lateral meniscal root intact.  Slight chondromalacia patella and medial compartment     Diagnosis Orders   1. Rupture of anterior cruciate ligament of left knee, initial encounter  Ambulatory referral to Physical Therapy    [facility] Lamppost Hinged Knee WrapAround Brace        Assessment and plan: 32 female with continued subjective symptoms of left knee injury/pain with known, correlating diagnosis of left knee ACL tear.  -Time of 12 minutes was spent coordinating and discussing the clinical findings and diagnostic imaging results as they pertain to the patient's presenting subjective symptoms.  -I had a pleasant discussion with the patient today.  I did review with her consideration for nonoperative versus operative measures.  I did review with her I would like her to start formal physical therapy to regain full range of motion.  I did review with her the risk and benefits of nonoperative versus operative measures.  Currently she wishes to  No

## 2024-01-04 NOTE — PROGRESS NOTE ADULT - PROBLEM SELECTOR PLAN 8
F: none, taking PO  E: replenish PRN  N: consistent carb diet  GI ppx: Protonix 40 mg PO qd  DVT ppx: Eliquis 5 mg BID

## 2024-01-04 NOTE — PROGRESS NOTE ADULT - PROBLEM SELECTOR PLAN 6
Known h/o b/l PEs in segmental and subsegmental branches on R and subsegmental branches on L, diagnosed on CT chest during ED visit 4/7/23. Takes Eliquis 5 mg BID at home.  - continue Eliquis

## 2024-01-04 NOTE — PROGRESS NOTE ADULT - PROBLEM SELECTOR PLAN 7
denies pain/discomfort Know history of CAD s/p multiple stents, takes Plavix 75 mg qd and Lipitor 80 mg qhs at home.   - [facility] 06/26/23: NAT x 1 pRCA (85%) and NAT x 1 RPLS (80%); residual dLCx/OM1 70%, mLAD 50%. staged PCI 08/01/2023 NAT x 1 mLCx/OM1 (70%), RCA: patent stents, LM: short segment, MLI, mLAD: 40%.  TTE 06/23/23: Mild LVH, hyperdynamic LV systolic function, normal RV sise and systolic function  s/p staged PCI 8/2023  Admission EKG 12/31/23 w/ sinus tachycardia  - continue plavix and lipitor Know history of CAD s/p multiple stents, takes Plavix 75 mg qd and Lipitor 80 mg qhs at home.   - [facility] 06/26/23: NAT x 1 pRCA (85%) and NAT x 1 RPLS (80%); residual dLCx/OM1 70%, mLAD 50%. staged PCI 08/01/2023 NAT x 1 mLCx/OM1 (70%), RCA: patent stents, LM: short segment, MLI, mLAD: 40%.  TTE 06/23/23: Mild LVH, hyperdynamic LV systolic function, normal RV sise and systolic function  s/p staged PCI 8/2023  Admission EKG 12/31/23 w/ sinus tachycardia  - continue plavix and lipitor

## 2024-01-04 NOTE — PROGRESS NOTE ADULT - ASSESSMENT
Ms. Ktaz is a 43-year-old female with a past medical history of uncontrolled type 2 diabetes mellitus, hypertension, hyperlipidemia, CVA (11/2021 with left sided residual weakness), pulmonary embolism (4/2023, on eliquis), coronary artery disease (PCI s/p NAT 7/2027 and 8/2027), history of abdominal necrotizing fasciitis (ostomy placement in 11/2021), multiple abdominal hernia repairs, recurrent UTI and previous admission for left labial abscess (8/2023) who presented to the emergency department complaining of left groin swelling that progressively increased over the previous 3 weeks being treated for intertrigo with low suspicion of necrotizing fascitis.    Endocrine consulted for uncontrolled type 2 diabetes.    A1C: 16.4 %  BUN: 17  Creatinine: 0.85  GFR: 87  Weight: 99.8   Ms. Katz is a 43-year-old female with a past medical history of uncontrolled type 2 diabetes mellitus, hypertension, hyperlipidemia, CVA (11/2021 with left sided residual weakness), pulmonary embolism (4/2023, on eliquis), coronary artery disease (PCI s/p NAT 7/2027 and 8/2027), history of abdominal necrotizing fasciitis (ostomy placement in 11/2021), multiple abdominal hernia repairs, recurrent UTI and previous admission for left labial abscess (8/2023) who presented to the emergency department complaining of left groin swelling that progressively increased over the previous 3 weeks being treated for intertrigo with low suspicion of necrotizing fascitis.    Endocrine consulted for uncontrolled type 2 diabetes.    A1C: 16.4 %  BUN: 17  Creatinine: 0.85  GFR: 87  Weight: 99.8

## 2024-01-04 NOTE — PROGRESS NOTE ADULT - ATTENDING COMMENTS
43-year-old female with a PMHx of asthma, CVA (residual L>R weakness), PE (on Eliquis), multiple abdominal hernia repairs, DMII, HTN, HLD, abdominal necrotizing fasciitis (s/p ostomy), frequent UTIs and CAD (s/p recent PCI) who presented with left groin swelling/pain, found to have hyperglycemia for which she was admitted to the MICU for insulin gtt, now stable for transfer to [facility].      #Poorly Controlled DMII (A1c 16.4%)    #Hyperglycemic Hyperosmolar Syndrome  -s/p MICU admission for insulin gtt, blood glucose now controlled and stable on RMF    -endocrine consulted, currently on NPH 72 units q8hrs, Lispro 40 units with meals and personalized ISS     -follow up discharge recommendations with endocrine     #Intertrigo    #Groin Erythema, Edema and Pain    -ID consulted, continue with Clotrimazole and Fluconazole   -ok to discontinue Linezolid and Ertapenem, has remained stable x 24 hours off Abx     #MSSA Bacteriuria    -ID consulted, feel this is colonization but on ABx as above    DVT PPx: Eliquis     Dispo: home 43-year-old female with a PMHx of asthma, CVA (residual L>R weakness), PE (on Eliquis), multiple abdominal hernia repairs, DMII, HTN, HLD, abdominal necrotizing fasciitis (s/p ostomy), frequent UTIs and CAD (s/p recent PCI) who presented with left groin swelling/pain, found to have hyperglycemia for which she was admitted to the MICU for insulin gtt, now stable for transfer to [facility].      #Poorly Controlled DMII (A1c 16.4%)    #Hyperglycemic Hyperosmolar Syndrome  -s/p MICU admission for insulin gtt, blood glucose now controlled and stable on RMF    -endocrine consulted, currently on NPH 72 units q8hrs, Lispro 40 units with meals and personalized ISS     -follow up discharge recommendations with endocrine     #Intertrigo    #Groin Erythema, Edema and Pain    -ID consulted, continue with Clotrimazole and Fluconazole   -ok to discontinue Linezolid and Ertapenem, has remained stable x 24 hours off Abx     #MSSA Bacteriuria    -ID consulted, feel this is colonization but on ABx as above    DVT PPx: Eliquis     Dispo: home

## 2024-01-04 NOTE — PROGRESS NOTE ADULT - SUBJECTIVE AND OBJECTIVE BOX
INFECTIOUS DISEASES CONSULT FOLLOW-UP NOTE    INTERVAL HPI/OVERNIGHT EVENTS:  No event overnight  patient reports groin and vaginal itchiness and pain     ROS:   Constitutional, eyes, ENT, cardiovascular, respiratory, gastrointestinal, genitourinary, integumentary, neurological, psychiatric and heme/lymph are otherwise negative other than noted above       ANTIBIOTICS/RELEVANT:    MEDICATIONS  (STANDING):  apixaban 5 milliGRAM(s) Oral every 12 hours  atorvastatin 80 milliGRAM(s) Oral at bedtime  chlorhexidine 2% Cloths 1 Application(s) Topical <User Schedule>  clopidogrel Tablet 75 milliGRAM(s) Oral daily  dextrose 5%. 1000 milliLiter(s) (50 mL/Hr) IV Continuous <Continuous>  dextrose 5%. 1000 milliLiter(s) (100 mL/Hr) IV Continuous <Continuous>  dextrose 50% Injectable 25 Gram(s) IV Push once  dextrose 50% Injectable 12.5 Gram(s) IV Push once  dextrose 50% Injectable 25 Gram(s) IV Push once  fluconAZOLE   Tablet 150 milliGRAM(s) Oral <User Schedule>  glucagon  Injectable 1 milliGRAM(s) IntraMuscular once  insulin lispro (ADMELOG) corrective regimen sliding scale   SubCutaneous Before meals and at bedtime  insulin lispro Injectable (ADMELOG) 40 Unit(s) SubCutaneous three times a day before meals  insulin NPH human recombinant 72 Unit(s) SubCutaneous <User Schedule>  metoprolol succinate ER 50 milliGRAM(s) Oral daily  nystatin Powder 1 Application(s) Topical two times a day  pantoprazole    Tablet 40 milliGRAM(s) Oral every 24 hours    MEDICATIONS  (PRN):  dextrose Oral Gel 15 Gram(s) Oral once PRN Blood Glucose LESS THAN 70 milliGRAM(s)/deciliter  oxyCODONE    IR 10 milliGRAM(s) Oral every 4 hours PRN Severe Pain (7 - 10)        Vital Signs Last 24 Hrs  T(C): 36.8 (04 Jan 2024 13:00), Max: 36.8 (04 Jan 2024 06:10)  T(F): 98.2 (04 Jan 2024 13:00), Max: 98.3 (04 Jan 2024 06:10)  HR: 99 (04 Jan 2024 13:00) (88 - 99)  BP: 134/85 (04 Jan 2024 13:00) (123/79 - 134/85)  BP(mean): --  RR: 18 (04 Jan 2024 06:10) (18 - 18)  SpO2: 97% (04 Jan 2024 06:10) (97% - 97%)    Parameters below as of 04 Jan 2024 06:10  Patient On (Oxygen Delivery Method): room air        PHYSICAL EXAM:  Constitutional: alert, NAD  Eyes: the sclera and conjunctiva were normal.   ENT: the ears and nose were normal in appearance.   Neck: the appearance of the neck was normal and the neck was supple.   Pulmonary: no respiratory distress   Groin: intertrigo at b/l groin and vaginal area       LABS:                        10.9   8.32  )-----------( 364      ( 04 Jan 2024 05:30 )             36.3     01-04    132<L>  |  102  |  17  ----------------------------<  247<H>  4.4   |  23  |  0.85    Ca    8.4      04 Jan 2024 05:30  Phos  3.6     01-04  Mg     1.6     01-04    TPro  5.8<L>  /  Alb  2.4<L>  /  TBili  <0.2  /  DBili  x   /  AST  20  /  ALT  15  /  AlkPhos  134<H>  01-04      Urinalysis Basic - ( 04 Jan 2024 05:30 )    Color: x / Appearance: x / SG: x / pH: x  Gluc: 247 mg/dL / Ketone: x  / Bili: x / Urobili: x   Blood: x / Protein: x / Nitrite: x   Leuk Esterase: x / RBC: x / WBC x   Sq Epi: x / Non Sq Epi: x / Bacteria: x        MICROBIOLOGY:      RADIOLOGY & ADDITIONAL STUDIES:  Reviewed

## 2024-01-04 NOTE — PROGRESS NOTE ADULT - PROBLEM SELECTOR PLAN 3
Known h/o frequent UTIs 2/2 suprapubic cath (removed 6/2023), recent admission for klebsiella and E coli UTI. Also has chronic dysuria. UCx 7/2023 +MSSA and E. faecalis, s/o linezolid course. UA w/ WBCs >24.  Resolved

## 2024-01-04 NOTE — PROGRESS NOTE ADULT - ASSESSMENT
42yo F PMH Asthma, CVA (11/2021; residual L>R weakness), PE (4/2023 on Eliquis), uncontrolled T2DM, HTN, HLD, h/o abdominal necrotizing fasciitis (s/p ostomy placement 11/2021 w/ intubation 11-12/2021), multiple abdominal hernia repairs, frequent UTIs, CAD s/p PCI presented 12/31 w/ worsening groin pain and redness, found to have glucose >600 w/ AG, admitted to MICU for insulin gtt, now hyperglycemia resolved s/p IV abx for groin infection stepped down to F for further management

## 2024-01-04 NOTE — PROGRESS NOTE ADULT - SUBJECTIVE AND OBJECTIVE BOX
SUBJECTIVE / INTERVAL HPI:  Patient was seen and examined this morning. Glucose now at target. Didn't eat dinner or breakfast because there were too many interruptions. Advised to only take premeal insulin is she is certain she will be eating immediately.  Discussed at length the importance of glucose control in preventing skin breakdown and infection in perianal area. Discussed role glucosuria with incontinence in relation to skin breakdown and infection.  Dexcom education provided and sensor placed to left arm.    CAPILLARY BLOOD GLUCOSE & INSULIN RECEIVED  99 mg/dL (01-03 @ 05:30)  110 mg/dL (01-03 @ 09:29) - NPH 70 + lisrpo 40  190 mg/dL (01-03 @ 13:19) - Lispro 40+3  160 mg/dL (01-03 @ 17:33) - NPH 72 + lispro 40+3  145 mg/dL (01-03 @ 22:06) - NPH held.  247 mg/dL (01-04 @ 05:30)  206 mg/dL (01-04 @ 09:00) - NPH 72 + lispro 40+6  mg/dL (01-04 @ lunch)      REVIEW OF SYSTEMS  Constitutional:  Negative fever, chills or loss of appetite.  Cardiovascular:  Negative for chest pain or palpitations.  Respiratory:  Negative for cough, wheezing, or shortness of breath.   Gastrointestinal:  Negative for nausea, vomiting, diarrhea, constipation, or abdominal pain.  Genitourinary:  (+) erythema, burning and pruritus of vulva. Negative frequency, urgency or dysuria.  Neurologic:  No headache, confusion, dizziness, lightheadedness.    PHYSICAL EXAM  Vital Signs Last 24 Hrs  T(C): 36.8 (04 Jan 2024 06:10), Max: 36.8 (03 Jan 2024 13:26)  T(F): 98.3 (04 Jan 2024 06:10), Max: 98.3 (04 Jan 2024 06:10)  HR: 88 (04 Jan 2024 06:10) (85 - 88)  BP: 123/79 (04 Jan 2024 06:10) (114/64 - 123/79)  BP(mean): --  RR: 18 (04 Jan 2024 06:10) (18 - 19)  SpO2: 97% (04 Jan 2024 06:10) (97% - 98%)    Parameters below as of 04 Jan 2024 06:10  Patient On (Oxygen Delivery Method): room air    Constitutional: Awake, alert, obese female, in no acute distress.   HEENT: Normocephalic, atraumatic, NAOMI.  Respiratory: Lungs clear to ausculation bilaterally.   Cardiovascular: regular rhythm, normal S1 and S2, no audible murmurs.   GI: soft, non-tender, non-distended, bowel sounds present. + ostomy  Extremities: No lower extremity edema.  Psychiatric: AAO x 3.     LABS  CBC - WBC/HGB/HTC/PLT: 8.32/10.9/36.3/364 (01-04-24)  BMP - Na/K/Cl/Bicarb/BUN/Cr/Gluc/AG/eGFR: 132/4.4/102/23/17/0.85/247/7/87 (01-04-24)  Ca - 8.4 (01-04-24)  Phos - 3.6 (01-04-24)  Mg - 1.6 (01-04-24)  LFT - Alb/Tprot/Tbili/Dbili/AlkPhos/ALT/AST: 2.4/--/<0.2/--/134/15/20 (01-04-24)  PT/aPTT/INR: 11.5/28.8/1.01 (12-31-23)   Thyroid Stimulating Hormone, Serum: 0.797 (12-31-23)      MEDICATIONS  MEDICATIONS  (STANDING):  apixaban 5 milliGRAM(s) Oral every 12 hours  atorvastatin 80 milliGRAM(s) Oral at bedtime  chlorhexidine 2% Cloths 1 Application(s) Topical <User Schedule>  clopidogrel Tablet 75 milliGRAM(s) Oral daily  dextrose 5%. 1000 milliLiter(s) (50 mL/Hr) IV Continuous <Continuous>  dextrose 5%. 1000 milliLiter(s) (100 mL/Hr) IV Continuous <Continuous>  dextrose 50% Injectable 25 Gram(s) IV Push once  dextrose 50% Injectable 12.5 Gram(s) IV Push once  dextrose 50% Injectable 25 Gram(s) IV Push once  fluconAZOLE   Tablet 150 milliGRAM(s) Oral <User Schedule>  glucagon  Injectable 1 milliGRAM(s) IntraMuscular once  insulin lispro (ADMELOG) corrective regimen sliding scale   SubCutaneous Before meals and at bedtime  insulin lispro Injectable (ADMELOG) 40 Unit(s) SubCutaneous three times a day before meals  insulin NPH human recombinant 72 Unit(s) SubCutaneous <User Schedule>  metoprolol succinate ER 50 milliGRAM(s) Oral daily  nystatin Powder 1 Application(s) Topical two times a day  pantoprazole    Tablet 40 milliGRAM(s) Oral every 24 hours    MEDICATIONS  (PRN):  dextrose Oral Gel 15 Gram(s) Oral once PRN Blood Glucose LESS THAN 70 milliGRAM(s)/deciliter  oxyCODONE    IR 10 milliGRAM(s) Oral every 4 hours PRN Severe Pain (7 - 10)

## 2024-01-04 NOTE — PROGRESS NOTE ADULT - PROBLEM SELECTOR PLAN 1
Known h/o IDDM, endocrine consulted last admission for management, discharged pt on Humalog 60u TID + Lispro 26u TID. Pt states she takes Humalog 80u TID and Lispro 36u TID at home. Noted to be hyperglycemic throughout last hospitalization. This hospitalization presented w/ abd pain, N/V and found to have glucose >600 and AG on admission. A1c 16.4% (1/1). S/p insulin gtt + IVF, now taking PO.  - continue NPH 72u q8h  - continue Lispro 35u TID pre-meal  - c/w customized Lispro correctional scale per endo

## 2024-01-04 NOTE — PROGRESS NOTE ADULT - ASSESSMENT
43F h/o uncontrolled DM (A1C 16.4), CVA, PE on apixaban, frequent UTIs 2/2 suprapubic cath now removed, abdominal wall nec fasc in 11/2021 s/p diverting colostomy at [facility], CAD s/p PCI, abdominal hernia s/p multiple repairs, L labial/inguinal abscess s/p I&D (MSSA, S.haemolyticus, E.faecium) and UTI (MSSA, E.faecalis) in 8/2023 p/w erythema/itchiness in b/l groin area for >4 weeks and n/v, found to have severe hyperglycemia >600 with lactic acidosis now resolved.  Found to have intertrigo at groin/vaginal area, unlikely bacterial, stable off abx.  Patient is worried and thinks insufficient antifungal treatment for intertrigo, and reports previous improvement with 14 day course of fluconazole.  Given obesity and uncontrolled diabetes, will treat with higher dose of antifungal regimen.  Patient remains at risk for SSTI/NSTI due to uncontrolled DM, which was discussed with patient.     - increase fluconazole to 400mg PO daily x 14 days, followed by 400mg PO weekly x 4  - Nystatin power bid for affected groin area  - Clotrimazole 2% cream bid for affected vaginal area   - Counseled patient on the importance of controlling diabetes in order to treat this intertrigo and prevent future bacterial skin infections. Gave strict return precautions for any worsening local symptoms of pain, redness, swelling, or any systemic symptoms of fever/chills/sweats.      Thank you for your consult.  Please re-consult us or call us with questions.  Case d/w primary team.    Stephanie Brandon MD, MS  Infectious Disease attending  office phone [phone]  For any questions during evening/weekend/holiday, please page ID on call    43F h/o uncontrolled DM (A1C 16.4), CVA, PE on apixaban, frequent UTIs 2/2 suprapubic cath now removed, abdominal wall nec fasc in 11/2021 s/p diverting colostomy at [facility], CAD s/p PCI, abdominal hernia s/p multiple repairs, L labial/inguinal abscess s/p I&D (MSSA, S.haemolyticus, E.faecium) and UTI (MSSA, E.faecalis) in 8/2023 p/w erythema/itchiness in b/l groin area for >4 weeks and n/v, found to have severe hyperglycemia >600 with lactic acidosis now resolved.  Found to have intertrigo at groin/vaginal area, unlikely bacterial, stable off abx.  Patient is worried and thinks insufficient antifungal treatment for intertrigo, and reports previous improvement with 14 day course of fluconazole.  Given obesity and uncontrolled diabetes, will treat with higher dose of antifungal regimen.  Patient remains at risk for SSTI/NSTI due to uncontrolled DM, which was discussed with patient.     - increase fluconazole to 400mg PO daily x 14 days, followed by 400mg PO weekly x 4  - Nystatin power bid for affected groin area  - Clotrimazole 2% cream bid for affected vaginal area   - Counseled patient on the importance of controlling diabetes in order to treat this intertrigo and prevent future bacterial skin infections. Gave strict return precautions for any worsening local symptoms of pain, redness, swelling, or any systemic symptoms of fever/chills/sweats.      Thank you for your consult.  Please re-consult us or call us with questions.  Case d/w primary team.    Stephanie Brandon MD, MS  Infectious Disease attending  office phone [phone]  For any questions during evening/weekend/holiday, please page ID on call

## 2024-01-04 NOTE — PROGRESS NOTE ADULT - PROBLEM SELECTOR PLAN 5
#h/o abdominal necrotizing fasciitis   #colostomy hernia  Presented w/ groin pain, abdominal pain and N/V. Was hospitalized at [facility] 8/2023 for L groin abscess and purulent cellulitis. Known h/o abdominal necrotizing fascitis s/p ostomy placement. Has pain around ostomy site.  - CT A/P 12/31: no fluid collection or evidence of necrotizing fasciitis of L groin or L mid upper thigh  - consulted surgery, rec'd no intervention #h/o abdominal necrotizing fasciitis   #colostomy hernia  Presented w/ groin pain, abdominal pain and N/V. Was hospitalized at [facility] 8/2023 for L groin abscess and purulent cellulitis. Known h/o abdominal necrotizing fascitis s/p ostomy placement. Has pain around ostomy site.  - CT A/P 12/31: no fluid collection or evidence of necrotizing fasciitis of L groin or L mid upper thigh  - consulted surgery, rec'd no intervention

## 2024-01-04 NOTE — PROGRESS NOTE ADULT - PROBLEM SELECTOR PLAN 2
Known h/o L labial abscess (s/p drainage last admission 8/2023) now presented w/ worsening groin redness, physical exam c/w intertrigo. CT A/P w/o abscess or fluid collection. Consulted ID given complex infectious history, thinking it is likely intertrigo as opposed to SSTI. BCx NGTD.    - continue nystatin powder BID for 2 weeks   - Clotrimazole 2% cream   - fluconazole 400 mg daily for 2 weeks

## 2024-01-04 NOTE — PROGRESS NOTE ADULT - SUBJECTIVE AND OBJECTIVE BOX
**INCOMPLETE NOTE    OVERNIGHT EVENTS:    SUBJECTIVE:  Patient seen and examined at bedside.    Vital Signs Last 12 Hrs  T(F): 98.3 (01-04-24 @ 06:10), Max: 98.3 (01-04-24 @ 06:10)  HR: 88 (01-04-24 @ 06:10) (88 - 88)  BP: 123/79 (01-04-24 @ 06:10) (123/79 - 123/79)  BP(mean): --  RR: 18 (01-04-24 @ 06:10) (18 - 18)  SpO2: 97% (01-04-24 @ 06:10) (97% - 97%)  I&O's Summary      PHYSICAL EXAM:    Constitutional: WDWN resting comfortably in bed; NAD  Head: NC/AT  Eyes: PERRL, EOMI, anicteric sclera  ENT: no nasal discharge; uvula midline, no oropharyngeal erythema or exudates; MMM  Neck: supple; no JVD or thyromegaly  Respiratory: CTA B/L; no W/R/R, no retractions  Cardiac: +S1/S2; RRR; no M/R/G; PMI non-displaced  Gastrointestinal: abdomen soft, NT/ND; no rebound or guarding; +BSx4  Extremities: WWP, no clubbing or cyanosis; no peripheral edema  Musculoskeletal: NROM x4; no joint swelling, tenderness or erythema  Vascular: 2+ radial, DP/PT pulses B/L  Lymphatic: no submandibular or cervical LAD  Neurologic: AAOx3  Psychiatric: affect and characteristics of appearance, verbalizations, behaviors are appropriate    LABS:                        10.9   8.32  )-----------( 364      ( 04 Jan 2024 05:30 )             36.3     01-04    132<L>  |  102  |  17  ----------------------------<  247<H>  4.4   |  23  |  0.85    Ca    8.4      04 Jan 2024 05:30  Phos  3.6     01-04  Mg     1.6     01-04    TPro  5.8<L>  /  Alb  2.4<L>  /  TBili  <0.2  /  DBili  x   /  AST  20  /  ALT  15  /  AlkPhos  134<H>  01-04      Urinalysis Basic - ( 04 Jan 2024 05:30 )    Color: x / Appearance: x / SG: x / pH: x  Gluc: 247 mg/dL / Ketone: x  / Bili: x / Urobili: x   Blood: x / Protein: x / Nitrite: x   Leuk Esterase: x / RBC: x / WBC x   Sq Epi: x / Non Sq Epi: x / Bacteria: x          RADIOLOGY & ADDITIONAL TESTS:    MEDICATIONS  (STANDING):  apixaban 5 milliGRAM(s) Oral every 12 hours  atorvastatin 80 milliGRAM(s) Oral at bedtime  chlorhexidine 2% Cloths 1 Application(s) Topical <User Schedule>  clopidogrel Tablet 75 milliGRAM(s) Oral daily  dextrose 5%. 1000 milliLiter(s) (50 mL/Hr) IV Continuous <Continuous>  dextrose 5%. 1000 milliLiter(s) (100 mL/Hr) IV Continuous <Continuous>  dextrose 50% Injectable 25 Gram(s) IV Push once  dextrose 50% Injectable 12.5 Gram(s) IV Push once  dextrose 50% Injectable 25 Gram(s) IV Push once  fluconAZOLE   Tablet 150 milliGRAM(s) Oral <User Schedule>  glucagon  Injectable 1 milliGRAM(s) IntraMuscular once  insulin lispro (ADMELOG) corrective regimen sliding scale   SubCutaneous Before meals and at bedtime  insulin lispro Injectable (ADMELOG) 40 Unit(s) SubCutaneous three times a day before meals  insulin NPH human recombinant 72 Unit(s) SubCutaneous <User Schedule>  metoprolol succinate ER 50 milliGRAM(s) Oral daily  nystatin Powder 1 Application(s) Topical two times a day  pantoprazole    Tablet 40 milliGRAM(s) Oral every 24 hours    MEDICATIONS  (PRN):  dextrose Oral Gel 15 Gram(s) Oral once PRN Blood Glucose LESS THAN 70 milliGRAM(s)/deciliter  oxyCODONE    IR 10 milliGRAM(s) Oral every 4 hours PRN Severe Pain (7 - 10)     OVERNIGHT EVENTS:    SUBJECTIVE:  Patient seen and examined at bedside. pt stating that her symptoms have not got better, denies cp, sob, v/n.     Vital Signs Last 12 Hrs  T(F): 98.3 (01-04-24 @ 06:10), Max: 98.3 (01-04-24 @ 06:10)  HR: 88 (01-04-24 @ 06:10) (88 - 88)  BP: 123/79 (01-04-24 @ 06:10) (123/79 - 123/79)  BP(mean): --  RR: 18 (01-04-24 @ 06:10) (18 - 18)  SpO2: 97% (01-04-24 @ 06:10) (97% - 97%)  I&O's Summary      PHYSICAL EXAM:    Constitutional: WDWN resting comfortably in bed; NAD  Head: NC/AT  Eyes: PERRL, EOMI, anicteric sclera  ENT: ; MMM  Neck:  no JVD   Respiratory: CTA B/L; no W/R/R, no retractions  Cardiac: +S1/S2; RRR; no M/R/G; PMI non-displaced  Cardiovascular: RRR, normal S1 and S2,  Gastrointestinal: tender to palpation around colostomy site which is c/d/i and without erythema or drainage  Extremities: WWP, no peripheral edema  Vascular: 2+ radial and DP pulses b/l  Neurological: AAOx3, moving all extremities spontaneously but with discomfort  Skin: differed exam     LABS:                        10.9   8.32  )-----------( 364      ( 04 Jan 2024 05:30 )             36.3     01-04    132<L>  |  102  |  17  ----------------------------<  247<H>  4.4   |  23  |  0.85    Ca    8.4      04 Jan 2024 05:30  Phos  3.6     01-04  Mg     1.6     01-04    TPro  5.8<L>  /  Alb  2.4<L>  /  TBili  <0.2  /  DBili  x   /  AST  20  /  ALT  15  /  AlkPhos  134<H>  01-04      Urinalysis Basic - ( 04 Jan 2024 05:30 )    Color: x / Appearance: x / SG: x / pH: x  Gluc: 247 mg/dL / Ketone: x  / Bili: x / Urobili: x   Blood: x / Protein: x / Nitrite: x   Leuk Esterase: x / RBC: x / WBC x   Sq Epi: x / Non Sq Epi: x / Bacteria: x          RADIOLOGY & ADDITIONAL TESTS:    MEDICATIONS  (STANDING):  apixaban 5 milliGRAM(s) Oral every 12 hours  atorvastatin 80 milliGRAM(s) Oral at bedtime  chlorhexidine 2% Cloths 1 Application(s) Topical <User Schedule>  clopidogrel Tablet 75 milliGRAM(s) Oral daily  dextrose 5%. 1000 milliLiter(s) (50 mL/Hr) IV Continuous <Continuous>  dextrose 5%. 1000 milliLiter(s) (100 mL/Hr) IV Continuous <Continuous>  dextrose 50% Injectable 25 Gram(s) IV Push once  dextrose 50% Injectable 12.5 Gram(s) IV Push once  dextrose 50% Injectable 25 Gram(s) IV Push once  fluconAZOLE   Tablet 150 milliGRAM(s) Oral <User Schedule>  glucagon  Injectable 1 milliGRAM(s) IntraMuscular once  insulin lispro (ADMELOG) corrective regimen sliding scale   SubCutaneous Before meals and at bedtime  insulin lispro Injectable (ADMELOG) 40 Unit(s) SubCutaneous three times a day before meals  insulin NPH human recombinant 72 Unit(s) SubCutaneous <User Schedule>  metoprolol succinate ER 50 milliGRAM(s) Oral daily  nystatin Powder 1 Application(s) Topical two times a day  pantoprazole    Tablet 40 milliGRAM(s) Oral every 24 hours    MEDICATIONS  (PRN):  dextrose Oral Gel 15 Gram(s) Oral once PRN Blood Glucose LESS THAN 70 milliGRAM(s)/deciliter  oxyCODONE    IR 10 milliGRAM(s) Oral every 4 hours PRN Severe Pain (7 - 10)

## 2024-01-04 NOTE — PROGRESS NOTE ADULT - PROBLEM SELECTOR PLAN 1
Type 2 diabetes mellitus with hyperglycemia   - Please continue NPH insulin  units every 8 hours (8AM, 4PM, 12AM).  - continue lispro units three times daily before meals.   - Continue customized order for Lispro insulin correctional scale as follows (FSG = Fingerstick glucose) to be given before meals:       > -200, give 3 units      > -250, give 6 units      > -300, give 9 units      > -350, give 12 units      > -400, give 15 units      > -450, give 18 units      >  and above, give 21 units   - Patient's fingerstick glucose goal is 100-180 mg/dL.    - Discharge recommendations: Humulin R U500 - 70 units TID, Admelog 40 units TID with meals. Start Ozmepic 0.25 mg weekly for 4 weeks, and then increase to 0.5mg weekly if tolerating (approved thru 06/2024 # [id number]). Start metformin ER 500mg 2 tablets twice daily. Dexcom covered with $0 copay; Pt educated and sensor placed to left arm.   - Patient can follow up at discharge with Dr. Joseph at [facility] by calling [phone] to make an appointment. Clinic contacted patient to schedule appt, but she said she would call to schedule after discharge.    Case discussed with Dr. Rose. Primary team updated. Type 2 diabetes mellitus with hyperglycemia   - Please continue NPH insulin  units every 8 hours (8AM, 4PM, 12AM).  - continue lispro units three times daily before meals.   - Continue customized order for Lispro insulin correctional scale as follows (FSG = Fingerstick glucose) to be given before meals:       > -200, give 3 units      > -250, give 6 units      > -300, give 9 units      > -350, give 12 units      > -400, give 15 units      > -450, give 18 units      >  and above, give 21 units   - Patient's fingerstick glucose goal is 100-180 mg/dL.    - Discharge recommendations: Humulin R U500 - 70 units TID, Admelog 40 units TID with meals. Start Ozmepic 0.25 mg weekly for 4 weeks, and then increase to 0.5mg weekly if tolerating (approved thru 06/2024 # [id number]). Start metformin ER 500mg 2 tablets twice daily. Dexcom covered with $0 copay; Pt educated and sensor placed to left arm.   - Patient can follow up at discharge with Dr. Joseph at [facility] by calling [phone] to make an appointment. Clinic contacted patient to schedule appt, but she said she would call to schedule after discharge.    Case discussed with Dr. Rose. Primary team updated. Type 2 diabetes mellitus with hyperglycemia   - Please continue NPH insulin 72 units every 8 hours (8AM, 4PM, 12AM).  - continue lispro 40 units three times daily before meals.   - Continue customized order for Lispro insulin correctional scale as follows (FSG = Fingerstick glucose) to be given before meals:       > -200, give 3 units      > -250, give 6 units      > -300, give 9 units      > -350, give 12 units      > -400, give 15 units      > -450, give 18 units      >  and above, give 21 units   - Patient's fingerstick glucose goal is 100-180 mg/dL.    - Discharge recommendations: Humulin R U500 - 70 units TID, Admelog 40 units TID with meals. Start Ozmepic 0.25 mg weekly for 4 weeks, and then increase to 0.5mg weekly if tolerating (approved thru 06/2024 # [id number]). Start metformin ER 500mg 2 tablets twice daily. Dexcom covered with $0 copay; Pt educated and sensor placed to left arm.   - Patient can follow up at discharge with Dr. Joseph at [facility] by calling [phone] to make an appointment. Clinic contacted patient to schedule appt, but she said she would call to schedule after discharge.    Case discussed with Dr. Rose. Primary team updated. Type 2 diabetes mellitus with hyperglycemia   - Please continue NPH insulin 72 units every 8 hours (8AM, 4PM, 12AM).  - continue lispro 40 units three times daily before meals.   - Continue customized order for Lispro insulin correctional scale as follows (FSG = Fingerstick glucose) to be given before meals:       > -200, give 3 units      > -250, give 6 units      > -300, give 9 units      > -350, give 12 units      > -400, give 15 units      > -450, give 18 units      >  and above, give 21 units   - Patient's fingerstick glucose goal is 100-180 mg/dL.    - Discharge recommendations: Humulin R U500 - 70 units TID, Admelog 40 units TID with meals. Start Ozmepic 0.25 mg weekly for 4 weeks, and then increase to 0.5mg weekly if tolerating (approved thru 06/2024 # [id number]). Start metformin ER 500mg 2 tablets twice daily. Dexcom covered with $0 copay; Pt educated and sensor placed to left arm.   - Patient can follow up at discharge with Dr. Joseph at [facility] by calling [phone] to make an appointment. Clinic contacted patient to schedule appt, but she said she would call to schedule after discharge.    Case discussed with Dr. Rose. Primary team updated.

## 2024-01-05 VITALS
OXYGEN SATURATION: 98 % | DIASTOLIC BLOOD PRESSURE: 83 MMHG | RESPIRATION RATE: 18 BRPM | TEMPERATURE: 97 F | SYSTOLIC BLOOD PRESSURE: 135 MMHG | HEART RATE: 93 BPM

## 2024-01-05 LAB
CULTURE RESULTS: SIGNIFICANT CHANGE UP
GLUCOSE BLDC GLUCOMTR-MCNC: 230 MG/DL — HIGH (ref 70–99)
GLUCOSE BLDC GLUCOMTR-MCNC: 230 MG/DL — HIGH (ref 70–99)
GLUCOSE BLDC GLUCOMTR-MCNC: 237 MG/DL — HIGH (ref 70–99)
GLUCOSE BLDC GLUCOMTR-MCNC: 237 MG/DL — HIGH (ref 70–99)
GLUCOSE BLDC GLUCOMTR-MCNC: 270 MG/DL — HIGH (ref 70–99)
GLUCOSE BLDC GLUCOMTR-MCNC: 270 MG/DL — HIGH (ref 70–99)
SPECIMEN SOURCE: SIGNIFICANT CHANGE UP

## 2024-01-05 PROCEDURE — 97161 PT EVAL LOW COMPLEX 20 MIN: CPT

## 2024-01-05 PROCEDURE — 85379 FIBRIN DEGRADATION QUANT: CPT

## 2024-01-05 PROCEDURE — 82330 ASSAY OF CALCIUM: CPT

## 2024-01-05 PROCEDURE — 82010 KETONE BODYS QUAN: CPT

## 2024-01-05 PROCEDURE — 80076 HEPATIC FUNCTION PANEL: CPT

## 2024-01-05 PROCEDURE — 71275 CT ANGIOGRAPHY CHEST: CPT | Mod: MA

## 2024-01-05 PROCEDURE — 84145 PROCALCITONIN (PCT): CPT

## 2024-01-05 PROCEDURE — 86140 C-REACTIVE PROTEIN: CPT

## 2024-01-05 PROCEDURE — 96374 THER/PROPH/DIAG INJ IV PUSH: CPT

## 2024-01-05 PROCEDURE — 84295 ASSAY OF SERUM SODIUM: CPT

## 2024-01-05 PROCEDURE — 82553 CREATINE MB FRACTION: CPT

## 2024-01-05 PROCEDURE — 73701 CT LOWER EXTREMITY W/DYE: CPT | Mod: MA

## 2024-01-05 PROCEDURE — 85610 PROTHROMBIN TIME: CPT

## 2024-01-05 PROCEDURE — 99239 HOSP IP/OBS DSCHRG MGMT >30: CPT | Mod: GC

## 2024-01-05 PROCEDURE — 83690 ASSAY OF LIPASE: CPT

## 2024-01-05 PROCEDURE — 74177 CT ABD & PELVIS W/CONTRAST: CPT | Mod: MA

## 2024-01-05 PROCEDURE — 81001 URINALYSIS AUTO W/SCOPE: CPT

## 2024-01-05 PROCEDURE — 84132 ASSAY OF SERUM POTASSIUM: CPT

## 2024-01-05 PROCEDURE — 71045 X-RAY EXAM CHEST 1 VIEW: CPT

## 2024-01-05 PROCEDURE — 85652 RBC SED RATE AUTOMATED: CPT

## 2024-01-05 PROCEDURE — 83036 HEMOGLOBIN GLYCOSYLATED A1C: CPT

## 2024-01-05 PROCEDURE — 84100 ASSAY OF PHOSPHORUS: CPT

## 2024-01-05 PROCEDURE — 36415 COLL VENOUS BLD VENIPUNCTURE: CPT

## 2024-01-05 PROCEDURE — 80048 BASIC METABOLIC PNL TOTAL CA: CPT

## 2024-01-05 PROCEDURE — 80053 COMPREHEN METABOLIC PANEL: CPT

## 2024-01-05 PROCEDURE — 96375 TX/PRO/DX INJ NEW DRUG ADDON: CPT

## 2024-01-05 PROCEDURE — 83605 ASSAY OF LACTIC ACID: CPT

## 2024-01-05 PROCEDURE — 85730 THROMBOPLASTIN TIME PARTIAL: CPT

## 2024-01-05 PROCEDURE — 84484 ASSAY OF TROPONIN QUANT: CPT

## 2024-01-05 PROCEDURE — 84443 ASSAY THYROID STIM HORMONE: CPT

## 2024-01-05 PROCEDURE — 99285 EMERGENCY DEPT VISIT HI MDM: CPT

## 2024-01-05 PROCEDURE — 87186 SC STD MICRODIL/AGAR DIL: CPT

## 2024-01-05 PROCEDURE — 82803 BLOOD GASES ANY COMBINATION: CPT

## 2024-01-05 PROCEDURE — 87040 BLOOD CULTURE FOR BACTERIA: CPT

## 2024-01-05 PROCEDURE — 81025 URINE PREGNANCY TEST: CPT

## 2024-01-05 PROCEDURE — 82550 ASSAY OF CK (CPK): CPT

## 2024-01-05 PROCEDURE — 87389 HIV-1 AG W/HIV-1&-2 AB AG IA: CPT

## 2024-01-05 PROCEDURE — 83735 ASSAY OF MAGNESIUM: CPT

## 2024-01-05 PROCEDURE — 82962 GLUCOSE BLOOD TEST: CPT

## 2024-01-05 PROCEDURE — 80074 ACUTE HEPATITIS PANEL: CPT

## 2024-01-05 PROCEDURE — 0225U NFCT DS DNA&RNA 21 SARSCOV2: CPT

## 2024-01-05 PROCEDURE — 87086 URINE CULTURE/COLONY COUNT: CPT

## 2024-01-05 PROCEDURE — 93005 ELECTROCARDIOGRAM TRACING: CPT

## 2024-01-05 PROCEDURE — 83880 ASSAY OF NATRIURETIC PEPTIDE: CPT

## 2024-01-05 PROCEDURE — 85025 COMPLETE CBC W/AUTO DIFF WBC: CPT

## 2024-01-05 RX ORDER — OXYCODONE HYDROCHLORIDE 5 MG/1
1 TABLET ORAL
Qty: 12 | Refills: 0
Start: 2024-01-05 | End: 2024-01-08

## 2024-01-05 RX ORDER — OXYCODONE HYDROCHLORIDE 5 MG/1
1 TABLET ORAL
Qty: 9 | Refills: 0
Start: 2024-01-05 | End: 2024-01-07

## 2024-01-05 RX ADMIN — NYSTATIN CREAM 1 APPLICATION(S): 100000 CREAM TOPICAL at 06:59

## 2024-01-05 RX ADMIN — APIXABAN 5 MILLIGRAM(S): 2.5 TABLET, FILM COATED ORAL at 06:58

## 2024-01-05 RX ADMIN — Medication 9: at 12:53

## 2024-01-05 RX ADMIN — Medication 6: at 09:06

## 2024-01-05 RX ADMIN — PANTOPRAZOLE SODIUM 40 MILLIGRAM(S): 20 TABLET, DELAYED RELEASE ORAL at 10:17

## 2024-01-05 RX ADMIN — Medication 40 UNIT(S): at 09:06

## 2024-01-05 RX ADMIN — OXYCODONE HYDROCHLORIDE 10 MILLIGRAM(S): 5 TABLET ORAL at 14:28

## 2024-01-05 RX ADMIN — Medication 50 MILLIGRAM(S): at 06:58

## 2024-01-05 RX ADMIN — Medication 40 UNIT(S): at 12:53

## 2024-01-05 RX ADMIN — OXYCODONE HYDROCHLORIDE 10 MILLIGRAM(S): 5 TABLET ORAL at 11:19

## 2024-01-05 RX ADMIN — OXYCODONE HYDROCHLORIDE 10 MILLIGRAM(S): 5 TABLET ORAL at 10:19

## 2024-01-05 RX ADMIN — HUMAN INSULIN 72 UNIT(S): 100 INJECTION, SUSPENSION SUBCUTANEOUS at 10:34

## 2024-01-05 RX ADMIN — CLOPIDOGREL BISULFATE 75 MILLIGRAM(S): 75 TABLET, FILM COATED ORAL at 12:42

## 2024-01-05 NOTE — PROGRESS NOTE ADULT - SUBJECTIVE AND OBJECTIVE BOX
SUBJECTIVE / INTERVAL HPI: Patient was seen and examined this morning. Found patine in bed with covers over face complaining of abdominal pain and headache. Reviewed 24 hr cgm tracing. She had drop to 70 at 8PM last night. She reports she received dinner, but roommate had emergency and by the time it was over she didn't feel like eating. She had parfait fo rlow and glucose went to normal range.    CAPILLARY BLOOD GLUCOSE & INSULIN RECEIVED  247 mg/dL (01-04 @ 05:30)  206 mg/dL (01-04 @ 09:00) - NPH 72 lispro 40+6  179 mg/dL (01-04 @ 12:47) - Lispro 40+3  195 mg/dL (01-04 @ 17:50) - NPH 72 Lispro 40+3  182 mg/dL (01-04 @ 22:34) - NPH held. Lispro 3  230 mg/dL (01-05 @ 08:57) - Lispro 40+6  237 mg/dL (01-05 @ 10:34) - NPH 72. Ate egg, sausage, milk, banana.  270 mg/dL (01-05 @ 12:29)      REVIEW OF SYSTEMS  Constitutional:  Negative fever, chills or loss of appetite.  Cardiovascular:  Negative for chest pain or palpitations.  Respiratory:  Negative for cough, wheezing, or shortness of breath.   Gastrointestinal:  Negative for nausea, vomiting, diarrhea, constipation, +abdominal pain.  Genitourinary:  (+) erythema, burning and pruritus of vulva. Negative frequency, urgency or dysuria.  Neurologic:  No  confusion, dizziness, lightheadedness. (+) headache    PHYSICAL EXAM  Vital Signs Last 24 Hrs  T(C): 36.2 (05 Jan 2024 08:44), Max: 36.9 (05 Jan 2024 04:18)  T(F): 97.2 (05 Jan 2024 08:44), Max: 98.5 (05 Jan 2024 04:18)  HR: 93 (05 Jan 2024 08:44) (83 - 93)  BP: 135/83 (05 Jan 2024 08:44) (97/65 - 135/83)  BP(mean): 97 (04 Jan 2024 22:22) (97 - 97)  RR: 18 (05 Jan 2024 08:44) (17 - 20)  SpO2: 98% (05 Jan 2024 08:44) (97% - 98%)    Parameters below as of 05 Jan 2024 08:44  Patient On (Oxygen Delivery Method): room air      Constitutional: Awake, alert, obese female, in no acute distress.   HEENT: Normocephalic, atraumatic, NAOMI.  Respiratory: Lungs clear to ausculation bilaterally.   Cardiovascular: regular rhythm, normal S1 and S2, no audible murmurs.   GI: soft, non-tender, non-distended, bowel sounds present. + ostomy  Extremities: No lower extremity edema.  Psychiatric: AAO x 3.     LABS  CBC - WBC/HGB/HTC/PLT: 8.32/10.9/36.3/364 (01-04-24)  BMP - Na/K/Cl/Bicarb/BUN/Cr/Gluc/AG/eGFR: 132/4.4/102/23/17/0.85/247/7/87 (01-04-24)  Ca - 8.4 (01-04-24)  Phos - 3.6 (01-04-24)  Mg - 1.6 (01-04-24)  LFT - Alb/Tprot/Tbili/Dbili/AlkPhos/ALT/AST: 2.4/--/<0.2/--/134/15/20 (01-04-24)  PT/aPTT/INR: 11.5/28.8/1.01 (12-31-23)   Thyroid Stimulating Hormone, Serum: 0.797 (12-31-23)      MEDICATIONS  MEDICATIONS  (STANDING):  apixaban 5 milliGRAM(s) Oral every 12 hours  atorvastatin 80 milliGRAM(s) Oral at bedtime  chlorhexidine 2% Cloths 1 Application(s) Topical <User Schedule>  clopidogrel Tablet 75 milliGRAM(s) Oral daily  dextrose 5%. 1000 milliLiter(s) (50 mL/Hr) IV Continuous <Continuous>  dextrose 5%. 1000 milliLiter(s) (100 mL/Hr) IV Continuous <Continuous>  dextrose 50% Injectable 25 Gram(s) IV Push once  dextrose 50% Injectable 12.5 Gram(s) IV Push once  dextrose 50% Injectable 25 Gram(s) IV Push once  fluconAZOLE   Tablet 150 milliGRAM(s) Oral <User Schedule>  glucagon  Injectable 1 milliGRAM(s) IntraMuscular once  insulin lispro (ADMELOG) corrective regimen sliding scale   SubCutaneous Before meals and at bedtime  insulin lispro Injectable (ADMELOG) 40 Unit(s) SubCutaneous three times a day before meals  insulin NPH human recombinant 72 Unit(s) SubCutaneous <User Schedule>  metoprolol succinate ER 50 milliGRAM(s) Oral daily  nystatin Powder 1 Application(s) Topical two times a day  pantoprazole    Tablet 40 milliGRAM(s) Oral every 24 hours    MEDICATIONS  (PRN):  dextrose Oral Gel 15 Gram(s) Oral once PRN Blood Glucose LESS THAN 70 milliGRAM(s)/deciliter  oxyCODONE    IR 10 milliGRAM(s) Oral every 4 hours PRN Severe Pain (7 - 10)

## 2024-01-05 NOTE — PROGRESS NOTE ADULT - PROBLEM SELECTOR PLAN 1
Type 2 diabetes mellitus with hyperglycemia   - Please continue NPH insulin 72 units every 8 hours (8AM, 4PM, 12AM).  - continue lispro 40 units three times daily before meals. Again advised patient to decline standing lispro if she doesn't plan on eating.  - Change lispro to regular MISS before meals and at bedtime  - Patient's fingerstick glucose goal is 100-180 mg/dL.    - Discharge recommendations: Humulin R U500 - 70 units TID, Admelog 40 units TID with meals. Start Ozempic 0.25 mg weekly for 4 weeks, and then increase to 0.5mg weekly if tolerating (approved thru 06/2024 # [id number]). Start metformin ER 500mg 2 tablets twice daily. Dexcom covered with $0 copay; Pt educated and sensor placed to left arm.   - Patient can follow up at discharge with Dr. Joseph at [facility] by calling [phone] to make an appointment. Clinic contacted patient to schedule appt, but she said she would call to schedule after discharge.    Case discussed with Dr. Rose. Primary team updated. Type 2 diabetes mellitus with hyperglycemia   - Please continue NPH insulin 72 units every 8 hours (8AM, 4PM, 12AM).  - continue lispro 40 units three times daily before meals. Again advised patient to decline standing lispro if she doesn't plan on eating.  - Change lispro to regular MISS before meals and at bedtime  - Patient's fingerstick glucose goal is 100-180 mg/dL.    - Discharge recommendations: Humulin R U500 - 70 units TID, Admelog 40 units TID with meals. Start Ozempic 0.25 mg weekly for 4 weeks, and then increase to 0.5mg weekly if tolerating (approved thru 06/2024 # [id number]). Start metformin ER 500mg 2 tablets twice daily. Dexcom covered with $0 copay; Pt educated and sensor placed to left arm.   - Patient can follow up at discharge with Dr. Joseph at [facility] by calling [phone] to make an appointment. Clinic contacted patient to schedule appt, but she said she would call to schedule after discharge.    Case discussed with Dr. Rose. Primary team updated.

## 2024-01-05 NOTE — PROGRESS NOTE ADULT - PROBLEM SELECTOR PROBLEM 1
Uncontrolled type 2 diabetes mellitus with hyperglycemia
Uncontrolled type 2 diabetes mellitus with hyperglycemia
Type 2 diabetes mellitus
Uncontrolled type 2 diabetes mellitus with hyperglycemia

## 2024-01-05 NOTE — PROGRESS NOTE ADULT - PROVIDER SPECIALTY LIST ADULT
Infectious Disease
MICU
Endocrinology
Infectious Disease
Internal Medicine
Internal Medicine
MICU
Surgery
Endocrinology
Internal Medicine
Infectious Disease
Endocrinology
Endocrinology

## 2024-01-05 NOTE — CHART NOTE - NSCHARTNOTEFT_GEN_A_CORE
Patient requires the following OSTOMY SUPPLIES for a 6 month supply:  [place] skin barriers (flat) 2 3/4 #01146  [place] drainable pouches 2 3/4 #37562   1" silk tape - 1 box   Refugious one piece for ileostomy, cut to fit  DERRICK [id number] - Dimensinos 11.65 x 5.9 x 0.01" Patient requires the following OSTOMY SUPPLIES for a 6 month supply:  [place] skin barriers (flat) 2 3/4 #61767  [place] drainable pouches 2 3/4 #79150   1" silk tape - 1 box   Refugious one piece for ileostomy, cut to fit  DERRICK [id number] - Dimensinos 11.65 x 5.9 x 0.01"

## 2024-01-05 NOTE — PROGRESS NOTE ADULT - ASSESSMENT
Ms. Katz is a 43-year-old female with a past medical history of uncontrolled type 2 diabetes mellitus, hypertension, hyperlipidemia, CVA (11/2021 with left sided residual weakness), pulmonary embolism (4/2023, on eliquis), coronary artery disease (PCI s/p NAT 7/2027 and 8/2027), history of abdominal necrotizing fasciitis (ostomy placement in 11/2021), multiple abdominal hernia repairs, recurrent UTI and previous admission for left labial abscess (8/2023) who presented to the emergency department complaining of left groin swelling that progressively increased over the previous 3 weeks being treated for intertrigo with low suspicion of necrotizing fascitis.    Endocrine consulted for uncontrolled type 2 diabetes.    A1C: 16.4 %  BUN: 17  Creatinine: 0.85  GFR: 87  Weight: 99.8

## 2024-01-06 NOTE — PHYSICAL THERAPY INITIAL EVALUATION ADULT - IMPAIRMENTS FOUND, PT EVAL
aerobic capacity/endurance/gait, locomotion, and balance
No respiratory distress. No stridor, Lungs sounds clear with good aeration bilaterally.

## 2024-01-11 ENCOUNTER — APPOINTMENT (OUTPATIENT)
Dept: ENDOCRINOLOGY | Facility: CLINIC | Age: 44
End: 2024-01-11

## 2024-01-11 DIAGNOSIS — E11.40 TYPE 2 DIABETES MELLITUS WITH DIABETIC NEUROPATHY, UNSPECIFIED: ICD-10-CM

## 2024-01-11 DIAGNOSIS — Z93.3 COLOSTOMY STATUS: ICD-10-CM

## 2024-01-11 DIAGNOSIS — Z98.890 OTHER SPECIFIED POSTPROCEDURAL STATES: ICD-10-CM

## 2024-01-11 DIAGNOSIS — Z86.711 PERSONAL HISTORY OF PULMONARY EMBOLISM: ICD-10-CM

## 2024-01-11 DIAGNOSIS — I25.10 ATHEROSCLEROTIC HEART DISEASE OF NATIVE CORONARY ARTERY WITHOUT ANGINA PECTORIS: ICD-10-CM

## 2024-01-11 DIAGNOSIS — Z88.0 ALLERGY STATUS TO PENICILLIN: ICD-10-CM

## 2024-01-11 DIAGNOSIS — K76.0 FATTY (CHANGE OF) LIVER, NOT ELSEWHERE CLASSIFIED: ICD-10-CM

## 2024-01-11 DIAGNOSIS — Z91.041 RADIOGRAPHIC DYE ALLERGY STATUS: ICD-10-CM

## 2024-01-11 DIAGNOSIS — N39.0 URINARY TRACT INFECTION, SITE NOT SPECIFIED: ICD-10-CM

## 2024-01-11 DIAGNOSIS — E83.42 HYPOMAGNESEMIA: ICD-10-CM

## 2024-01-11 DIAGNOSIS — E66.9 OBESITY, UNSPECIFIED: ICD-10-CM

## 2024-01-11 DIAGNOSIS — Z95.5 PRESENCE OF CORONARY ANGIOPLASTY IMPLANT AND GRAFT: ICD-10-CM

## 2024-01-11 DIAGNOSIS — L03.314 CELLULITIS OF GROIN: ICD-10-CM

## 2024-01-11 DIAGNOSIS — E11.00 TYPE 2 DIABETES MELLITUS WITH HYPEROSMOLARITY WITHOUT NONKETOTIC HYPERGLYCEMIC-HYPEROSMOLAR COMA (NKHHC): ICD-10-CM

## 2024-01-11 DIAGNOSIS — L30.4 ERYTHEMA INTERTRIGO: ICD-10-CM

## 2024-01-11 DIAGNOSIS — E87.6 HYPOKALEMIA: ICD-10-CM

## 2024-01-11 DIAGNOSIS — Z98.891 HISTORY OF UTERINE SCAR FROM PREVIOUS SURGERY: ICD-10-CM

## 2024-01-11 DIAGNOSIS — E27.8 OTHER SPECIFIED DISORDERS OF ADRENAL GLAND: ICD-10-CM

## 2024-01-11 DIAGNOSIS — T38.0X5A ADVERSE EFFECT OF GLUCOCORTICOIDS AND SYNTHETIC ANALOGUES, INITIAL ENCOUNTER: ICD-10-CM

## 2024-01-11 DIAGNOSIS — I69.352 HEMIPLEGIA AND HEMIPARESIS FOLLOWING CEREBRAL INFARCTION AFFECTING LEFT DOMINANT SIDE: ICD-10-CM

## 2024-01-11 DIAGNOSIS — J45.909 UNSPECIFIED ASTHMA, UNCOMPLICATED: ICD-10-CM

## 2024-01-11 DIAGNOSIS — Z79.01 LONG TERM (CURRENT) USE OF ANTICOAGULANTS: ICD-10-CM

## 2024-01-11 DIAGNOSIS — E83.39 OTHER DISORDERS OF PHOSPHORUS METABOLISM: ICD-10-CM

## 2024-01-11 DIAGNOSIS — E87.20 ACIDOSIS, UNSPECIFIED: ICD-10-CM

## 2024-01-11 DIAGNOSIS — Z79.4 LONG TERM (CURRENT) USE OF INSULIN: ICD-10-CM

## 2024-01-22 RX ORDER — ALCOHOL ANTISEPTIC PADS
PADS, MEDICATED (EA) TOPICAL
Qty: 3 | Refills: 3 | Status: ACTIVE | COMMUNITY
Start: 2020-10-16 | End: 1900-01-01

## 2024-01-25 NOTE — ED PROVIDER NOTE - ENMT, MLM
CALL IF YOU NEED [phone]    GENTLE SKIN CARE  Note: These lists are suggestions for fragrance-free products.   Always read ingredients on the label. Be sure that products are fragrance-free.  Avoid products that have “fragrance” as one of the ingredients. Products that are  unscented may still have fragrance added.   Avoid products with added ingredients: Vitamin E, witch hazel, menthol, and acids.  These are some of the products that you could use. If you use other products, be sure to read the ingredient label.    Moisturizers: Use them two times a day, even if skin does not feel dry.  Ointments (Best)   Aquaphor Healing Ointment®   Petroleum Jelly®   Vaseline®  Creams (Good)   CeraVe Moisturizing Cream   Cetaphil® Moisturizing Cream    Do not use lotions. They have alcohol and a lot of water in them.    Skin cleansers: Use only on areas that need to be cleaned.   Dove®  Sensitive Skin Beauty Bar    Aveeno®: Skin Relief Body Wash   Aquaphor Gentle Wash and Shampoo   CeraVe Hydrating Cleanser   Vanicream® Cleansing Bar   Cetaphil® Gentle Skin Cleanser/ Gentle Cleansing Bar  
Airway patent, Nasal mucosa clear. Mouth with normal mucosa. Throat has no vesicles, no oropharyngeal exudates and uvula is midline.

## 2024-02-06 ENCOUNTER — APPOINTMENT (OUTPATIENT)
Dept: ENDOCRINOLOGY | Facility: CLINIC | Age: 44
End: 2024-02-06

## 2024-02-06 ENCOUNTER — NON-APPOINTMENT (OUTPATIENT)
Age: 44
End: 2024-02-06

## 2024-02-06 NOTE — ED ADULT NURSE NOTE - NSFALLRSKUNASSIST_ED_ALL_ED
Extremely fatigued today, lightheaded, a little short of breath, hard time concentrating at work. Pt states hx of low iron.  States she feels like this often, but never this intense. Reports getting over a cold from last week, tested negative for covid 2 days ago at home. Reports some nausea this morning. C/o headache   no

## 2024-02-10 VITALS
HEIGHT: 67 IN | OXYGEN SATURATION: 98 % | HEART RATE: 116 BPM | DIASTOLIC BLOOD PRESSURE: 76 MMHG | SYSTOLIC BLOOD PRESSURE: 184 MMHG | WEIGHT: 223.11 LBS | TEMPERATURE: 99 F | RESPIRATION RATE: 17 BRPM

## 2024-02-10 PROCEDURE — 99285 EMERGENCY DEPT VISIT HI MDM: CPT

## 2024-02-10 NOTE — ED ADULT TRIAGE NOTE - CHIEF COMPLAINT QUOTE
43F with extensive medical hx presents with multiple medical complaints including "leaking" ostomy, low blood sugars at home and n/v x3 days. pt ambulatory into triage stating "my blood sugar is dropping." BG 69 - given juice in triage. A&Ox4 speaking in clear, complete sentences. pt recently admitted for "internal infection" 2 weeks ago. 43F with extensive medical hx presents with multiple medical complaints including "leaking" ostomy, low blood sugars at home and n/v x3 days. pt ambulatory into triage with rollator stating "my blood sugar is dropping." BG 69 - given juice in triage. A&Ox4 speaking in clear, complete sentences. pt recently admitted for "internal infection" 2 weeks ago.

## 2024-02-10 NOTE — ED ADULT NURSE NOTE - NSFALLUNIVINTERV_ED_ALL_ED
Refer to the Assessment tab to view/cancel completed assessment. Bed/Stretcher in lowest position, wheels locked, appropriate side rails in place/Call bell, personal items and telephone in reach/Instruct patient to call for assistance before getting out of bed/chair/stretcher/Non-slip footwear applied when patient is off stretcher/[place] to call system/Physically safe environment - no spills, clutter or unnecessary equipment/Purposeful proactive rounding/Room/bathroom lighting operational, light cord in reach

## 2024-02-10 NOTE — ED ADULT NURSE NOTE - OBJECTIVE STATEMENT
pt reports right sided abdominal pain "that's been going on for some time but worse in the last three days." with vomiting today. Pt finished cup of juice, blood sugar of 69 at triage

## 2024-02-10 NOTE — ED ADULT NURSE NOTE - CHIEF COMPLAINT QUOTE
43F with extensive medical hx presents with multiple medical complaints including "leaking" ostomy, low blood sugars at home and n/v x3 days. pt ambulatory into triage with rollator stating "my blood sugar is dropping." BG 69 - given juice in triage. A&Ox4 speaking in clear, complete sentences. pt recently admitted for "internal infection" 2 weeks ago.

## 2024-02-11 ENCOUNTER — INPATIENT (INPATIENT)
Facility: HOSPITAL | Age: 44
LOS: 8 days | Discharge: HOME | DRG: 74 | End: 2024-02-20
Attending: STUDENT IN AN ORGANIZED HEALTH CARE EDUCATION/TRAINING PROGRAM | Admitting: STUDENT IN AN ORGANIZED HEALTH CARE EDUCATION/TRAINING PROGRAM
Payer: MEDICAID

## 2024-02-11 DIAGNOSIS — Z98.89 OTHER SPECIFIED POSTPROCEDURAL STATES: Chronic | ICD-10-CM

## 2024-02-11 DIAGNOSIS — Z98.890 OTHER SPECIFIED POSTPROCEDURAL STATES: Chronic | ICD-10-CM

## 2024-02-11 DIAGNOSIS — R65.10 SYSTEMIC INFLAMMATORY RESPONSE SYNDROME (SIRS) OF NON-INFECTIOUS ORIGIN WITHOUT ACUTE ORGAN DYSFUNCTION: ICD-10-CM

## 2024-02-11 DIAGNOSIS — E87.6 HYPOKALEMIA: ICD-10-CM

## 2024-02-11 DIAGNOSIS — K92.2 GASTROINTESTINAL HEMORRHAGE, UNSPECIFIED: ICD-10-CM

## 2024-02-11 DIAGNOSIS — Z93.9 ARTIFICIAL OPENING STATUS, UNSPECIFIED: Chronic | ICD-10-CM

## 2024-02-11 DIAGNOSIS — E16.2 HYPOGLYCEMIA, UNSPECIFIED: ICD-10-CM

## 2024-02-11 DIAGNOSIS — E11.9 TYPE 2 DIABETES MELLITUS WITHOUT COMPLICATIONS: ICD-10-CM

## 2024-02-11 DIAGNOSIS — R19.7 DIARRHEA, UNSPECIFIED: ICD-10-CM

## 2024-02-11 DIAGNOSIS — R10.9 UNSPECIFIED ABDOMINAL PAIN: ICD-10-CM

## 2024-02-11 DIAGNOSIS — I25.10 ATHEROSCLEROTIC HEART DISEASE OF NATIVE CORONARY ARTERY WITHOUT ANGINA PECTORIS: ICD-10-CM

## 2024-02-11 DIAGNOSIS — Z29.9 ENCOUNTER FOR PROPHYLACTIC MEASURES, UNSPECIFIED: ICD-10-CM

## 2024-02-11 DIAGNOSIS — I26.99 OTHER PULMONARY EMBOLISM WITHOUT ACUTE COR PULMONALE: ICD-10-CM

## 2024-02-11 DIAGNOSIS — A41.9 SEPSIS, UNSPECIFIED ORGANISM: ICD-10-CM

## 2024-02-11 LAB
ALBUMIN SERPL ELPH-MCNC: 3 G/DL — LOW (ref 3.3–5)
ALBUMIN SERPL ELPH-MCNC: 3.6 G/DL — SIGNIFICANT CHANGE UP (ref 3.3–5)
ALP SERPL-CCNC: 128 U/L — HIGH (ref 40–120)
ALP SERPL-CCNC: 97 U/L — SIGNIFICANT CHANGE UP (ref 40–120)
ALT FLD-CCNC: 6 U/L — LOW (ref 10–45)
ALT FLD-CCNC: 8 U/L — LOW (ref 10–45)
ANION GAP SERPL CALC-SCNC: 13 MMOL/L — SIGNIFICANT CHANGE UP (ref 5–17)
ANION GAP SERPL CALC-SCNC: 6 MMOL/L — SIGNIFICANT CHANGE UP (ref 5–17)
ANION GAP SERPL CALC-SCNC: 9 MMOL/L — SIGNIFICANT CHANGE UP (ref 5–17)
APPEARANCE UR: CLEAR — SIGNIFICANT CHANGE UP
APTT BLD: 28.3 SEC — SIGNIFICANT CHANGE UP (ref 24.5–35.6)
AST SERPL-CCNC: 11 U/L — SIGNIFICANT CHANGE UP (ref 10–40)
AST SERPL-CCNC: 13 U/L — SIGNIFICANT CHANGE UP (ref 10–40)
BACTERIA # UR AUTO: ABNORMAL /HPF
BASE EXCESS BLDV CALC-SCNC: 0 MMOL/L — SIGNIFICANT CHANGE UP (ref -2–3)
BASOPHILS # BLD AUTO: 0.03 K/UL — SIGNIFICANT CHANGE UP (ref 0–0.2)
BASOPHILS NFR BLD AUTO: 0.2 % — SIGNIFICANT CHANGE UP (ref 0–2)
BILIRUB DIRECT SERPL-MCNC: 0.2 MG/DL — SIGNIFICANT CHANGE UP (ref 0–0.3)
BILIRUB INDIRECT FLD-MCNC: SIGNIFICANT CHANGE UP (ref 0.2–1)
BILIRUB SERPL-MCNC: <0.2 MG/DL — SIGNIFICANT CHANGE UP (ref 0.2–1.2)
BILIRUB SERPL-MCNC: <0.2 MG/DL — SIGNIFICANT CHANGE UP (ref 0.2–1.2)
BILIRUB UR-MCNC: NEGATIVE — SIGNIFICANT CHANGE UP
BUN SERPL-MCNC: 12 MG/DL — SIGNIFICANT CHANGE UP (ref 7–23)
BUN SERPL-MCNC: 9 MG/DL — SIGNIFICANT CHANGE UP (ref 7–23)
BUN SERPL-MCNC: 9 MG/DL — SIGNIFICANT CHANGE UP (ref 7–23)
C DIFF GDH STL QL: NEGATIVE — SIGNIFICANT CHANGE UP
C DIFF GDH STL QL: SIGNIFICANT CHANGE UP
CA-I SERPL-SCNC: 1.16 MMOL/L — SIGNIFICANT CHANGE UP (ref 1.15–1.33)
CALCIUM SERPL-MCNC: 7.4 MG/DL — LOW (ref 8.4–10.5)
CALCIUM SERPL-MCNC: 7.6 MG/DL — LOW (ref 8.4–10.5)
CALCIUM SERPL-MCNC: 9.2 MG/DL — SIGNIFICANT CHANGE UP (ref 8.4–10.5)
CAST: 0 /LPF — SIGNIFICANT CHANGE UP (ref 0–4)
CHLORIDE SERPL-SCNC: 103 MMOL/L — SIGNIFICANT CHANGE UP (ref 96–108)
CHLORIDE SERPL-SCNC: 108 MMOL/L — SIGNIFICANT CHANGE UP (ref 96–108)
CHLORIDE SERPL-SCNC: 108 MMOL/L — SIGNIFICANT CHANGE UP (ref 96–108)
CO2 BLDV-SCNC: 26.7 MMOL/L — HIGH (ref 22–26)
CO2 SERPL-SCNC: 20 MMOL/L — LOW (ref 22–31)
CO2 SERPL-SCNC: 22 MMOL/L — SIGNIFICANT CHANGE UP (ref 22–31)
CO2 SERPL-SCNC: 24 MMOL/L — SIGNIFICANT CHANGE UP (ref 22–31)
COLOR SPEC: YELLOW — SIGNIFICANT CHANGE UP
CREAT SERPL-MCNC: 0.54 MG/DL — SIGNIFICANT CHANGE UP (ref 0.5–1.3)
CREAT SERPL-MCNC: 0.64 MG/DL — SIGNIFICANT CHANGE UP (ref 0.5–1.3)
CREAT SERPL-MCNC: 0.73 MG/DL — SIGNIFICANT CHANGE UP (ref 0.5–1.3)
DIFF PNL FLD: NEGATIVE — SIGNIFICANT CHANGE UP
EGFR: 105 ML/MIN/1.73M2 — SIGNIFICANT CHANGE UP
EGFR: 112 ML/MIN/1.73M2 — SIGNIFICANT CHANGE UP
EGFR: 117 ML/MIN/1.73M2 — SIGNIFICANT CHANGE UP
EOSINOPHIL # BLD AUTO: 0.49 K/UL — SIGNIFICANT CHANGE UP (ref 0–0.5)
EOSINOPHIL NFR BLD AUTO: 3.5 % — SIGNIFICANT CHANGE UP (ref 0–6)
FLUAV AG NPH QL: SIGNIFICANT CHANGE UP
FLUBV AG NPH QL: SIGNIFICANT CHANGE UP
GAS PNL BLDV: 138 MMOL/L — SIGNIFICANT CHANGE UP (ref 136–145)
GAS PNL BLDV: SIGNIFICANT CHANGE UP
GAS PNL BLDV: SIGNIFICANT CHANGE UP
GI PCR PANEL: SIGNIFICANT CHANGE UP
GLUCOSE BLDC GLUCOMTR-MCNC: 105 MG/DL — HIGH (ref 70–99)
GLUCOSE BLDC GLUCOMTR-MCNC: 109 MG/DL — HIGH (ref 70–99)
GLUCOSE BLDC GLUCOMTR-MCNC: 145 MG/DL — HIGH (ref 70–99)
GLUCOSE BLDC GLUCOMTR-MCNC: 287 MG/DL — HIGH (ref 70–99)
GLUCOSE BLDC GLUCOMTR-MCNC: 76 MG/DL — SIGNIFICANT CHANGE UP (ref 70–99)
GLUCOSE SERPL-MCNC: 101 MG/DL — HIGH (ref 70–99)
GLUCOSE SERPL-MCNC: 273 MG/DL — HIGH (ref 70–99)
GLUCOSE SERPL-MCNC: 66 MG/DL — LOW (ref 70–99)
GLUCOSE UR QL: 100 MG/DL
HCG SERPL-ACNC: <0 MIU/ML — SIGNIFICANT CHANGE UP
HCO3 BLDV-SCNC: 25 MMOL/L — SIGNIFICANT CHANGE UP (ref 22–29)
HCT VFR BLD CALC: 39.3 % — SIGNIFICANT CHANGE UP (ref 34.5–45)
HGB BLD-MCNC: 11.7 G/DL — SIGNIFICANT CHANGE UP (ref 11.5–15.5)
IMM GRANULOCYTES NFR BLD AUTO: 0.3 % — SIGNIFICANT CHANGE UP (ref 0–0.9)
INR BLD: 0.95 — SIGNIFICANT CHANGE UP (ref 0.85–1.18)
KETONES UR-MCNC: NEGATIVE MG/DL — SIGNIFICANT CHANGE UP
LACTATE SERPL-SCNC: 1.9 MMOL/L — SIGNIFICANT CHANGE UP (ref 0.5–2)
LACTATE SERPL-SCNC: 3.5 MMOL/L — HIGH (ref 0.5–2)
LEUKOCYTE ESTERASE UR-ACNC: ABNORMAL
LIDOCAIN IGE QN: 26 U/L — SIGNIFICANT CHANGE UP (ref 7–60)
LYMPHOCYTES # BLD AUTO: 2.85 K/UL — SIGNIFICANT CHANGE UP (ref 1–3.3)
LYMPHOCYTES # BLD AUTO: 20.3 % — SIGNIFICANT CHANGE UP (ref 13–44)
MAGNESIUM SERPL-MCNC: 1.6 MG/DL — SIGNIFICANT CHANGE UP (ref 1.6–2.6)
MCHC RBC-ENTMCNC: 22.5 PG — LOW (ref 27–34)
MCHC RBC-ENTMCNC: 29.8 GM/DL — LOW (ref 32–36)
MCV RBC AUTO: 75.4 FL — LOW (ref 80–100)
MONOCYTES # BLD AUTO: 0.84 K/UL — SIGNIFICANT CHANGE UP (ref 0–0.9)
MONOCYTES NFR BLD AUTO: 6 % — SIGNIFICANT CHANGE UP (ref 2–14)
NEUTROPHILS # BLD AUTO: 9.81 K/UL — HIGH (ref 1.8–7.4)
NEUTROPHILS NFR BLD AUTO: 69.7 % — SIGNIFICANT CHANGE UP (ref 43–77)
NITRITE UR-MCNC: NEGATIVE — SIGNIFICANT CHANGE UP
NRBC # BLD: 0 /100 WBCS — SIGNIFICANT CHANGE UP (ref 0–0)
PCO2 BLDV: 43 MMHG — HIGH (ref 39–42)
PH BLDV: 7.38 — SIGNIFICANT CHANGE UP (ref 7.32–7.43)
PH UR: 6.5 — SIGNIFICANT CHANGE UP (ref 5–8)
PLATELET # BLD AUTO: 518 K/UL — HIGH (ref 150–400)
PO2 BLDV: 35 MMHG — SIGNIFICANT CHANGE UP (ref 25–45)
POTASSIUM BLDV-SCNC: 3 MMOL/L — LOW (ref 3.5–5.1)
POTASSIUM SERPL-MCNC: 3 MMOL/L — LOW (ref 3.5–5.3)
POTASSIUM SERPL-MCNC: 3.2 MMOL/L — LOW (ref 3.5–5.3)
POTASSIUM SERPL-MCNC: 4.4 MMOL/L — SIGNIFICANT CHANGE UP (ref 3.5–5.3)
POTASSIUM SERPL-SCNC: 3 MMOL/L — LOW (ref 3.5–5.3)
POTASSIUM SERPL-SCNC: 3.2 MMOL/L — LOW (ref 3.5–5.3)
POTASSIUM SERPL-SCNC: 4.4 MMOL/L — SIGNIFICANT CHANGE UP (ref 3.5–5.3)
PROT SERPL-MCNC: 5.9 G/DL — LOW (ref 6–8.3)
PROT SERPL-MCNC: 7.3 G/DL — SIGNIFICANT CHANGE UP (ref 6–8.3)
PROT UR-MCNC: SIGNIFICANT CHANGE UP MG/DL
PROTHROM AB SERPL-ACNC: 10.8 SEC — SIGNIFICANT CHANGE UP (ref 9.5–13)
RBC # BLD: 5.21 M/UL — HIGH (ref 3.8–5.2)
RBC # FLD: 15.2 % — HIGH (ref 10.3–14.5)
RBC CASTS # UR COMP ASSIST: 2 /HPF — SIGNIFICANT CHANGE UP (ref 0–4)
RSV RNA NPH QL NAA+NON-PROBE: SIGNIFICANT CHANGE UP
SAO2 % BLDV: 54.3 % — LOW (ref 67–88)
SARS-COV-2 RNA SPEC QL NAA+PROBE: SIGNIFICANT CHANGE UP
SODIUM SERPL-SCNC: 134 MMOL/L — LOW (ref 135–145)
SODIUM SERPL-SCNC: 139 MMOL/L — SIGNIFICANT CHANGE UP (ref 135–145)
SODIUM SERPL-SCNC: 140 MMOL/L — SIGNIFICANT CHANGE UP (ref 135–145)
SP GR SPEC: 1.01 — SIGNIFICANT CHANGE UP (ref 1–1.03)
SQUAMOUS # UR AUTO: 5 /HPF — SIGNIFICANT CHANGE UP (ref 0–5)
UROBILINOGEN FLD QL: 0.2 MG/DL — SIGNIFICANT CHANGE UP (ref 0.2–1)
WBC # BLD: 14.06 K/UL — HIGH (ref 3.8–10.5)
WBC # FLD AUTO: 14.06 K/UL — HIGH (ref 3.8–10.5)
WBC UR QL: 8 /HPF — HIGH (ref 0–5)
YEAST-LIKE CELLS: PRESENT

## 2024-02-11 PROCEDURE — 99223 1ST HOSP IP/OBS HIGH 75: CPT | Mod: GC

## 2024-02-11 PROCEDURE — 93010 ELECTROCARDIOGRAM REPORT: CPT

## 2024-02-11 PROCEDURE — 99222 1ST HOSP IP/OBS MODERATE 55: CPT

## 2024-02-11 PROCEDURE — 71045 X-RAY EXAM CHEST 1 VIEW: CPT | Mod: 26

## 2024-02-11 PROCEDURE — 99221 1ST HOSP IP/OBS SF/LOW 40: CPT | Mod: GC

## 2024-02-11 PROCEDURE — 74176 CT ABD & PELVIS W/O CONTRAST: CPT | Mod: 26,MA

## 2024-02-11 RX ORDER — ATORVASTATIN CALCIUM 80 MG/1
80 TABLET, FILM COATED ORAL AT BEDTIME
Refills: 0 | Status: DISCONTINUED | OUTPATIENT
Start: 2024-02-11 | End: 2024-02-20

## 2024-02-11 RX ORDER — INSULIN LISPRO 100/ML
20 VIAL (ML) SUBCUTANEOUS
Refills: 0 | Status: DISCONTINUED | OUTPATIENT
Start: 2024-02-11 | End: 2024-02-12

## 2024-02-11 RX ORDER — INFLUENZA VIRUS VACCINE 15; 15; 15; 15 UG/.5ML; UG/.5ML; UG/.5ML; UG/.5ML
0.5 SUSPENSION INTRAMUSCULAR ONCE
Refills: 0 | Status: COMPLETED | OUTPATIENT
Start: 2024-02-11 | End: 2024-02-11

## 2024-02-11 RX ORDER — OXYCODONE HYDROCHLORIDE 5 MG/1
5 TABLET ORAL ONCE
Refills: 0 | Status: DISCONTINUED | OUTPATIENT
Start: 2024-02-11 | End: 2024-02-11

## 2024-02-11 RX ORDER — SODIUM CHLORIDE 9 MG/ML
1000 INJECTION, SOLUTION INTRAVENOUS
Refills: 0 | Status: DISCONTINUED | OUTPATIENT
Start: 2024-02-11 | End: 2024-02-12

## 2024-02-11 RX ORDER — SODIUM CHLORIDE 9 MG/ML
1000 INJECTION, SOLUTION INTRAVENOUS
Refills: 0 | Status: DISCONTINUED | OUTPATIENT
Start: 2024-02-11 | End: 2024-02-11

## 2024-02-11 RX ORDER — INSULIN GLARGINE 100 [IU]/ML
25 INJECTION, SOLUTION SUBCUTANEOUS AT BEDTIME
Refills: 0 | Status: DISCONTINUED | OUTPATIENT
Start: 2024-02-11 | End: 2024-02-12

## 2024-02-11 RX ORDER — DEXTROSE 50 % IN WATER 50 %
50 SYRINGE (ML) INTRAVENOUS ONCE
Refills: 0 | Status: COMPLETED | OUTPATIENT
Start: 2024-02-11 | End: 2024-02-11

## 2024-02-11 RX ORDER — HYDROMORPHONE HYDROCHLORIDE 2 MG/ML
0.5 INJECTION INTRAMUSCULAR; INTRAVENOUS; SUBCUTANEOUS ONCE
Refills: 0 | Status: DISCONTINUED | OUTPATIENT
Start: 2024-02-11 | End: 2024-02-11

## 2024-02-11 RX ORDER — PANTOPRAZOLE SODIUM 20 MG/1
40 TABLET, DELAYED RELEASE ORAL
Refills: 0 | Status: DISCONTINUED | OUTPATIENT
Start: 2024-02-11 | End: 2024-02-20

## 2024-02-11 RX ORDER — NYSTATIN CREAM 100000 [USP'U]/G
1 CREAM TOPICAL
Refills: 0 | Status: DISCONTINUED | OUTPATIENT
Start: 2024-02-11 | End: 2024-02-20

## 2024-02-11 RX ORDER — PANTOPRAZOLE SODIUM 20 MG/1
40 TABLET, DELAYED RELEASE ORAL ONCE
Refills: 0 | Status: COMPLETED | OUTPATIENT
Start: 2024-02-11 | End: 2024-02-11

## 2024-02-11 RX ORDER — INSULIN LISPRO 100/ML
VIAL (ML) SUBCUTANEOUS
Refills: 0 | Status: DISCONTINUED | OUTPATIENT
Start: 2024-02-11 | End: 2024-02-12

## 2024-02-11 RX ORDER — ACETAMINOPHEN 500 MG
650 TABLET ORAL EVERY 6 HOURS
Refills: 0 | Status: DISCONTINUED | OUTPATIENT
Start: 2024-02-11 | End: 2024-02-14

## 2024-02-11 RX ORDER — APIXABAN 2.5 MG/1
5 TABLET, FILM COATED ORAL EVERY 12 HOURS
Refills: 0 | Status: DISCONTINUED | OUTPATIENT
Start: 2024-02-11 | End: 2024-02-20

## 2024-02-11 RX ORDER — SODIUM CHLORIDE 9 MG/ML
1000 INJECTION INTRAMUSCULAR; INTRAVENOUS; SUBCUTANEOUS ONCE
Refills: 0 | Status: COMPLETED | OUTPATIENT
Start: 2024-02-11 | End: 2024-02-11

## 2024-02-11 RX ORDER — DEXTROSE 50 % IN WATER 50 %
25 SYRINGE (ML) INTRAVENOUS ONCE
Refills: 0 | Status: DISCONTINUED | OUTPATIENT
Start: 2024-02-11 | End: 2024-02-20

## 2024-02-11 RX ORDER — SODIUM CHLORIDE 9 MG/ML
1000 INJECTION, SOLUTION INTRAVENOUS
Refills: 0 | Status: DISCONTINUED | OUTPATIENT
Start: 2024-02-11 | End: 2024-02-20

## 2024-02-11 RX ORDER — LOPERAMIDE HCL 2 MG
2 TABLET ORAL EVERY 6 HOURS
Refills: 0 | Status: DISCONTINUED | OUTPATIENT
Start: 2024-02-11 | End: 2024-02-14

## 2024-02-11 RX ORDER — INSULIN LISPRO 100/ML
15 VIAL (ML) SUBCUTANEOUS ONCE
Refills: 0 | Status: COMPLETED | OUTPATIENT
Start: 2024-02-11 | End: 2024-02-11

## 2024-02-11 RX ORDER — CLOPIDOGREL BISULFATE 75 MG/1
75 TABLET, FILM COATED ORAL DAILY
Refills: 0 | Status: DISCONTINUED | OUTPATIENT
Start: 2024-02-11 | End: 2024-02-15

## 2024-02-11 RX ORDER — METOPROLOL TARTRATE 50 MG
50 TABLET ORAL DAILY
Refills: 0 | Status: DISCONTINUED | OUTPATIENT
Start: 2024-02-11 | End: 2024-02-20

## 2024-02-11 RX ORDER — HYDROMORPHONE HYDROCHLORIDE 2 MG/ML
0.5 INJECTION INTRAMUSCULAR; INTRAVENOUS; SUBCUTANEOUS EVERY 6 HOURS
Refills: 0 | Status: DISCONTINUED | OUTPATIENT
Start: 2024-02-11 | End: 2024-02-12

## 2024-02-11 RX ORDER — GABAPENTIN 400 MG/1
600 CAPSULE ORAL EVERY 8 HOURS
Refills: 0 | Status: DISCONTINUED | OUTPATIENT
Start: 2024-02-11 | End: 2024-02-20

## 2024-02-11 RX ORDER — ONDANSETRON 8 MG/1
4 TABLET, FILM COATED ORAL ONCE
Refills: 0 | Status: COMPLETED | OUTPATIENT
Start: 2024-02-11 | End: 2024-02-11

## 2024-02-11 RX ORDER — OXYCODONE HYDROCHLORIDE 5 MG/1
5 TABLET ORAL EVERY 6 HOURS
Refills: 0 | Status: DISCONTINUED | OUTPATIENT
Start: 2024-02-11 | End: 2024-02-12

## 2024-02-11 RX ORDER — DEXTROSE 50 % IN WATER 50 %
15 SYRINGE (ML) INTRAVENOUS ONCE
Refills: 0 | Status: DISCONTINUED | OUTPATIENT
Start: 2024-02-11 | End: 2024-02-20

## 2024-02-11 RX ORDER — MAGNESIUM SULFATE 500 MG/ML
1 VIAL (ML) INJECTION ONCE
Refills: 0 | Status: COMPLETED | OUTPATIENT
Start: 2024-02-11 | End: 2024-02-11

## 2024-02-11 RX ORDER — INSULIN LISPRO 100/ML
VIAL (ML) SUBCUTANEOUS
Refills: 0 | Status: DISCONTINUED | OUTPATIENT
Start: 2024-02-11 | End: 2024-02-11

## 2024-02-11 RX ORDER — OXYCODONE HYDROCHLORIDE 5 MG/1
10 TABLET ORAL EVERY 6 HOURS
Refills: 0 | Status: DISCONTINUED | OUTPATIENT
Start: 2024-02-11 | End: 2024-02-12

## 2024-02-11 RX ORDER — POTASSIUM CHLORIDE 20 MEQ
10 PACKET (EA) ORAL
Refills: 0 | Status: DISCONTINUED | OUTPATIENT
Start: 2024-02-11 | End: 2024-02-12

## 2024-02-11 RX ORDER — IOHEXOL 300 MG/ML
30 INJECTION, SOLUTION INTRAVENOUS ONCE
Refills: 0 | Status: DISCONTINUED | OUTPATIENT
Start: 2024-02-11 | End: 2024-02-11

## 2024-02-11 RX ORDER — HYDROMORPHONE HYDROCHLORIDE 2 MG/ML
1 INJECTION INTRAMUSCULAR; INTRAVENOUS; SUBCUTANEOUS ONCE
Refills: 0 | Status: DISCONTINUED | OUTPATIENT
Start: 2024-02-11 | End: 2024-02-11

## 2024-02-11 RX ORDER — LACTOBACILLUS ACIDOPHILUS 100MM CELL
1 CAPSULE ORAL DAILY
Refills: 0 | Status: DISCONTINUED | OUTPATIENT
Start: 2024-02-11 | End: 2024-02-20

## 2024-02-11 RX ORDER — INSULIN LISPRO 100/ML
VIAL (ML) SUBCUTANEOUS AT BEDTIME
Refills: 0 | Status: DISCONTINUED | OUTPATIENT
Start: 2024-02-11 | End: 2024-02-12

## 2024-02-11 RX ORDER — MORPHINE SULFATE 50 MG/1
4 CAPSULE, EXTENDED RELEASE ORAL ONCE
Refills: 0 | Status: DISCONTINUED | OUTPATIENT
Start: 2024-02-11 | End: 2024-02-11

## 2024-02-11 RX ORDER — POTASSIUM CHLORIDE 20 MEQ
10 PACKET (EA) ORAL ONCE
Refills: 0 | Status: COMPLETED | OUTPATIENT
Start: 2024-02-11 | End: 2024-02-11

## 2024-02-11 RX ORDER — POTASSIUM CHLORIDE 20 MEQ
40 PACKET (EA) ORAL EVERY 4 HOURS
Refills: 0 | Status: COMPLETED | OUTPATIENT
Start: 2024-02-11 | End: 2024-02-11

## 2024-02-11 RX ORDER — APIXABAN 2.5 MG/1
5 TABLET, FILM COATED ORAL EVERY 12 HOURS
Refills: 0 | Status: DISCONTINUED | OUTPATIENT
Start: 2024-02-11 | End: 2024-02-11

## 2024-02-11 RX ORDER — GLUCAGON INJECTION, SOLUTION 0.5 MG/.1ML
1 INJECTION, SOLUTION SUBCUTANEOUS ONCE
Refills: 0 | Status: DISCONTINUED | OUTPATIENT
Start: 2024-02-11 | End: 2024-02-20

## 2024-02-11 RX ADMIN — INSULIN GLARGINE 25 UNIT(S): 100 INJECTION, SOLUTION SUBCUTANEOUS at 23:41

## 2024-02-11 RX ADMIN — APIXABAN 5 MILLIGRAM(S): 2.5 TABLET, FILM COATED ORAL at 18:11

## 2024-02-11 RX ADMIN — OXYCODONE HYDROCHLORIDE 5 MILLIGRAM(S): 5 TABLET ORAL at 10:10

## 2024-02-11 RX ADMIN — NYSTATIN CREAM 1 APPLICATION(S): 100000 CREAM TOPICAL at 18:12

## 2024-02-11 RX ADMIN — Medication 1 TABLET(S): at 12:12

## 2024-02-11 RX ADMIN — OXYCODONE HYDROCHLORIDE 10 MILLIGRAM(S): 5 TABLET ORAL at 18:11

## 2024-02-11 RX ADMIN — OXYCODONE HYDROCHLORIDE 10 MILLIGRAM(S): 5 TABLET ORAL at 19:11

## 2024-02-11 RX ADMIN — HYDROMORPHONE HYDROCHLORIDE 0.5 MILLIGRAM(S): 2 INJECTION INTRAMUSCULAR; INTRAVENOUS; SUBCUTANEOUS at 15:30

## 2024-02-11 RX ADMIN — Medication 100 GRAM(S): at 01:15

## 2024-02-11 RX ADMIN — Medication 15 UNIT(S): at 18:17

## 2024-02-11 RX ADMIN — Medication 650 MILLIGRAM(S): at 12:37

## 2024-02-11 RX ADMIN — SODIUM CHLORIDE 100 MILLILITER(S): 9 INJECTION, SOLUTION INTRAVENOUS at 04:00

## 2024-02-11 RX ADMIN — ONDANSETRON 4 MILLIGRAM(S): 8 TABLET, FILM COATED ORAL at 00:27

## 2024-02-11 RX ADMIN — CLOPIDOGREL BISULFATE 75 MILLIGRAM(S): 75 TABLET, FILM COATED ORAL at 12:12

## 2024-02-11 RX ADMIN — Medication 50 MILLILITER(S): at 03:03

## 2024-02-11 RX ADMIN — SODIUM CHLORIDE 70 MILLILITER(S): 9 INJECTION, SOLUTION INTRAVENOUS at 18:12

## 2024-02-11 RX ADMIN — Medication 50 MILLILITER(S): at 01:15

## 2024-02-11 RX ADMIN — Medication 40 MILLIEQUIVALENT(S): at 10:10

## 2024-02-11 RX ADMIN — SODIUM CHLORIDE 1000 MILLILITER(S): 9 INJECTION INTRAMUSCULAR; INTRAVENOUS; SUBCUTANEOUS at 02:38

## 2024-02-11 RX ADMIN — HYDROMORPHONE HYDROCHLORIDE 0.5 MILLIGRAM(S): 2 INJECTION INTRAMUSCULAR; INTRAVENOUS; SUBCUTANEOUS at 04:03

## 2024-02-11 RX ADMIN — Medication 650 MILLIGRAM(S): at 23:12

## 2024-02-11 RX ADMIN — GABAPENTIN 600 MILLIGRAM(S): 400 CAPSULE ORAL at 14:27

## 2024-02-11 RX ADMIN — ATORVASTATIN CALCIUM 80 MILLIGRAM(S): 80 TABLET, FILM COATED ORAL at 23:12

## 2024-02-11 RX ADMIN — Medication 100 MILLIEQUIVALENT(S): at 06:22

## 2024-02-11 RX ADMIN — Medication 100 MILLIEQUIVALENT(S): at 04:15

## 2024-02-11 RX ADMIN — Medication 200 MILLIGRAM(S): at 18:11

## 2024-02-11 RX ADMIN — Medication 650 MILLIGRAM(S): at 18:11

## 2024-02-11 RX ADMIN — Medication 40 MILLIEQUIVALENT(S): at 14:27

## 2024-02-11 RX ADMIN — HYDROMORPHONE HYDROCHLORIDE 0.5 MILLIGRAM(S): 2 INJECTION INTRAMUSCULAR; INTRAVENOUS; SUBCUTANEOUS at 14:30

## 2024-02-11 RX ADMIN — Medication 200 MILLIGRAM(S): at 23:13

## 2024-02-11 RX ADMIN — GABAPENTIN 600 MILLIGRAM(S): 400 CAPSULE ORAL at 23:12

## 2024-02-11 RX ADMIN — SODIUM CHLORIDE 90 MILLILITER(S): 9 INJECTION, SOLUTION INTRAVENOUS at 07:50

## 2024-02-11 RX ADMIN — SODIUM CHLORIDE 1000 MILLILITER(S): 9 INJECTION INTRAMUSCULAR; INTRAVENOUS; SUBCUTANEOUS at 00:19

## 2024-02-11 RX ADMIN — Medication 40 MILLIEQUIVALENT(S): at 18:11

## 2024-02-11 RX ADMIN — Medication 100 MILLIEQUIVALENT(S): at 02:13

## 2024-02-11 RX ADMIN — HYDROMORPHONE HYDROCHLORIDE 1 MILLIGRAM(S): 2 INJECTION INTRAMUSCULAR; INTRAVENOUS; SUBCUTANEOUS at 01:15

## 2024-02-11 RX ADMIN — HYDROMORPHONE HYDROCHLORIDE 0.5 MILLIGRAM(S): 2 INJECTION INTRAMUSCULAR; INTRAVENOUS; SUBCUTANEOUS at 04:28

## 2024-02-11 RX ADMIN — Medication 50 MILLIGRAM(S): at 12:12

## 2024-02-11 RX ADMIN — HYDROMORPHONE HYDROCHLORIDE 1 MILLIGRAM(S): 2 INJECTION INTRAMUSCULAR; INTRAVENOUS; SUBCUTANEOUS at 01:40

## 2024-02-11 NOTE — H&P ADULT - NSHPLABSRESULTS_GEN_ALL_CORE
.  LABS:                         11.7   14.06 )-----------( 518      ( 11 Feb 2024 00:15 )             39.3     02-11    139  |  108  |  9   ----------------------------<  101<H>  3.2<L>   |  22  |  0.54    Ca    7.4<L>      11 Feb 2024 05:00  Mg     1.6     02-11    TPro  7.3  /  Alb  3.6  /  TBili  <0.2  /  DBili  x   /  AST  11  /  ALT  8<L>  /  AlkPhos  128<H>  02-11    PT/INR - ( 11 Feb 2024 00:15 )   PT: 10.8 sec;   INR: 0.95          PTT - ( 11 Feb 2024 00:15 )  PTT:28.3 sec  Urinalysis Basic - ( 11 Feb 2024 05:00 )    Color: x / Appearance: x / SG: x / pH: x  Gluc: 101 mg/dL / Ketone: x  / Bili: x / Urobili: x   Blood: x / Protein: x / Nitrite: x   Leuk Esterase: x / RBC: x / WBC x   Sq Epi: x / Non Sq Epi: x / Bacteria: x        Lactate, Blood: 1.9 mmol/L (02-11 @ 04:03)  Lactate, Blood: 3.5 mmol/L (02-11 @ 00:15)      RADIOLOGY, EKG & ADDITIONAL TESTS: Reviewed.

## 2024-02-11 NOTE — H&P ADULT - HISTORY OF PRESENT ILLNESS
HPI: 42 y/o F PMH CVA (residual L>R weakness), PE (on Eliquis), multiple abdominal hernia repairs, nec fasc of abdomen s/o corbin procedure w ostomy, uncontrolled DM,  CAD with multiple stents (last in August 2023) presents with n/v, increased ostomy output, and low blood sugar. About 4-5 days ago she was having nausea and stopped eating as much solids and having mostly liquids. She then noticed increased yellowish watery output from her ostomy 3 days ago, with one episode of dark red blood. There was leaking of her ostomy associated with redness and irritation of around her ostomy site. She had an episode of NBNB vomiting yesterday prompting her to come to the ED. She also had low f/s at home associated with palpitations and clamminess. She has had abd pressure like pain in b/l lower quadrants over the same period of time. She denies any f/c though she felt a bit warm and denies any chest pain, dyspnea. She has noticed dark foul smelling urine but no dysuria. Of note, she was here last month with hyperglycemia when she was started on a new insulin regimen which she has been adherent with.        In the ED:  Initial vital signs: T: 98.8 F, HR: 116-->97, BP: 184/76-->139/70, R: 17, SpO2: 98% on RA  ED course:   Labs: significant for WBC 14.06, 70% neutrophils, plt 518, K 3.0-->3.2, glucose 69-->223-->72, lactate 3.5-->1.9, lipase 26, Mg 1.6, UA negative for pyuria, c diff negative, GI PCR negative  Imaging:  CT AP: No acute findings  CXR: no acute pathology  EKG: sinus tachy to 96, QTc 502`  Medications: dilaudid 1mg IV, dilaudid 0.5mg IV, Magneisum sulfate 1g, zofran 4mg IV, KCL 10meq IV x3, 2L NS, D5 gtt, D5 1/2NS  Consults: MICU consulted for hypokalemia and hypoglycemia as sugar dropped after D5 was stopped, they recommended repletion of K, workup of ostomy output, and continuing with D5 1/2NS and endocrine consult

## 2024-02-11 NOTE — CONSULT NOTE ADULT - SUBJECTIVE AND OBJECTIVE BOX
HPI: 43yFemale    Age at Dx:  How dx:  Hx and duration of insulin:  Current Therapy:  Hx of hypoglycemia  Hx of DKA/HHS?    Home FSG:  Fasting  Lunch  Dinner  Bed    Hx of other regimens  Complications:  Outpatient Endo:    PMH & Surgical Hx:ABDOMINAL PAINHYPOKALEMIA    Complex Care    No pertinent family history in first degree relatives    FH: diabetes mellitus    FH: hypertension    Handoff    MEWS Score    Essential hypertension    Hyperlipidemia    Diabetes    Obesity    Hernia    Abdominal hernia    Pre-eclampsia    Pulmonary embolism    Type 2 diabetes mellitus    History of necrotizing fasciitis    History of creation of ostomy    Suprapubic catheter    H/O: CVA (cerebrovascular accident)    Sepsis, unspecified organism    Abdominal pain    Severe sepsis    Diarrhea    GI bleed    Hypokalemia    Hypoglycemia    CAD (coronary artery disease)    Diabetes    Pulmonary embolism    Prophylactic measure    SIRS (systemic inflammatory response syndrome)    Abdominal pain with vomiting    H/O LEEP    History of D&C    H/O:     H/O ventral hernia repair    H/O exploratory laparotomy    History of creation of ostomy    ABDOMINAL PAIN    36    Hypokalemia    Multiple episodes of hypoglycemia    [id number]        FH:  DM:  Thyroid:  Autoimmune:  Other:    SH:  Smoking  Etoh:  Recreational Drugs:  Social Life:    Home Meds:    Current Meds:  acetaminophen     Tablet .. 650 milliGRAM(s) Oral every 6 hours  apixaban 5 milliGRAM(s) Oral every 12 hours  atorvastatin 80 milliGRAM(s) Oral at bedtime  clopidogrel Tablet 75 milliGRAM(s) Oral daily  dextrose 5% + sodium chloride 0.45%. 1000 milliLiter(s) IV Continuous <Continuous>  dextrose 5%. 1000 milliLiter(s) IV Continuous <Continuous>  dextrose 50% Injectable 25 Gram(s) IV Push once  dextrose Oral Gel 15 Gram(s) Oral once PRN  gabapentin 600 milliGRAM(s) Oral every 8 hours  glucagon  Injectable 1 milliGRAM(s) IntraMuscular once  HYDROmorphone  Injectable 0.5 milliGRAM(s) IV Push every 6 hours PRN  lactobacillus acidophilus 1 Tablet(s) Oral daily  loperamide 2 milliGRAM(s) Oral every 6 hours PRN  metoprolol succinate ER 50 milliGRAM(s) Oral daily  nystatin Powder 1 Application(s) Topical two times a day  oxyCODONE    IR 10 milliGRAM(s) Oral every 6 hours PRN  oxyCODONE    IR 5 milliGRAM(s) Oral every 6 hours PRN  pantoprazole    Tablet 40 milliGRAM(s) Oral two times a day  potassium chloride    Tablet ER 40 milliEquivalent(s) Oral every 4 hours  potassium chloride  10 mEq/100 mL IVPB 10 milliEquivalent(s) IV Intermittent every 1 hour  trimethobenzamide Injectable 200 milliGRAM(s) IntraMuscular every 6 hours      Allergies:  penicillin (Hives)  vancomycin (Anaphylaxis; Hives)  shellfish. (Hives; Anaphylaxis)  clindamycin (Pruritus)  Bactrim I.V. (Rash (Mod to Severe))  fish (Hives; Urticaria)  iodine containing compounds (Hives; Anaphylaxis)  amoxicillin (Short breath; Rash)      ROS:  Denies the following except as indicated.    General: weight loss/weight gain, decreased appetite, fatigue  Eyes: Blurry vision, double vision, visual changes  ENT: Throat pain, changes in voice,   CV: palpitations, SOB, CP, cough  GI: NVD, difficulty swallowing, abdominal pain  : polyuria, dysuria  Endo: abnormal menses, temperature intolerance, decreased libido  MSK: weakness, joint pain  Skin: rash, dryness, diaphoresis  Heme: Easy bruising,bleeding  Neuro: HA, dizziness, lightheadedness, numbness tingling  Psych: Anxiety, Depression    Vital Signs Last 24 Hrs  T(C): 37 (2024 16:22), Max: 37.1 (10 Feb 2024 23:34)  T(F): 98.6 (2024 16:22), Max: 98.8 (10 Feb 2024 23:34)  HR: 86 (2024 16:22) (86 - 116)  BP: 131/79 (2024 16:22) (117/56 - 184/76)  BP(mean): --  RR: 17 (2024 16:22) (16 - 18)  SpO2: 97% (2024 16:22) (97% - 98%)    Parameters below as of 2024 08:12  Patient On (Oxygen Delivery Method): room air      Height (cm): 170.2 (02-10 @ 23:34)  Weight (kg): 101.2 (02-10 @ 23:34)  BMI (kg/m2): 34.9 (02-10 @ 23:34)      Constitutional: wn/wd in NAD.   HEENT: NCAT, MMM, OP clear, EOMI, , no proptosis or lid retraction  Neck: no thyromegaly or palpable thyroid nodules   Respiratory: lungs CTAB.  Cardiovascular: regular rhythm, normal S1 and S2, no audible murmurs, no peripheral edema  GI: soft, NT/ND, no masses/HSM appreciated.  Neurology: no tremors, DTR 2+  Skin: no visible rashes/lesions  Psychiatric: AAO x 3, normal affect/mood.  Ext: radial pulses intact, DP pulses intact, extremities warm, no cyanosis, clubbing or edema.       LABS:                        11.7   14.06 )-----------( 518      ( 2024 00:15 )             39.3     02-    134<L>  |  108  |  9   ----------------------------<  273<H>  x    |  20<L>  |  0.73    Ca    7.6<L>      2024 16:57  Mg     1.6     02-11    TPro  5.9<L>  /  Alb  3.0<L>  /  TBili  <0.2  /  DBili  0.2  /  AST  13  /  ALT  6<L>  /  AlkPhos  97  02-11    PT/INR - ( 2024 00:15 )   PT: 10.8 sec;   INR: 0.95          PTT - ( 2024 00:15 )  PTT:28.3 sec  Urinalysis Basic - ( 2024 16:57 )    Color: x / Appearance: x / SG: x / pH: x  Gluc: 273 mg/dL / Ketone: x  / Bili: x / Urobili: x   Blood: x / Protein: x / Nitrite: x   Leuk Esterase: x / RBC: x / WBC x   Sq Epi: x / Non Sq Epi: x / Bacteria: x        Thyroid Stimulating Hormone, Serum: 0.797 ( @ 12:46)  Thyroid Stimulating Hormone, Serum: 0.305 ( @ 07:24)  Thyroid Stimulating Hormone, Serum: 0.851 ( @ 05:24)      RADIOLOGY & ADDITIONAL STUDIES:    CAPILLARY BLOOD GLUCOSE      A/P:43y Female    1.  DM  Please continue lantus       units at night / morning.  Please continue lispro      units before each meal.  Please continue lispro moderate / low dose sliding scale four times daily with meals and at bedtime    Pt's fingerstick glucose goal is     Will continue to monitor     For discharge, pt can continue    Pt can follow up at discharge with [facility] by calling  to make an appointment.   Will discuss case with     and update primary team HPI: 43yFemale with a history of insulin-requiring type 2 DM, CVA, pulm emboli (on Eliquis), recurrent UTIs, multiple abdominal hernia repairs, and an episode of necrotizing fasciitis (s/p Brandie procedure with placement of permanent colostomy) who presented to the ED late yesterday with a 3 day history of markedly increased ostomy outputs plus a 1-day history of feculent vomiting.  Has not had any fever, chills or recent dysuria.  Had mild generalized abdominal discomfort.    Was hypoglycemic upon arrival in the ED, and has responded to initiation of D5/0.5 NS at 90 cc/hr.  Fingerstick ashley to 145 at lunch, and is now (5 PM, on pt's Dexcom sensor) up to 320 mg%  Has been diabetic since 2004, insulin-requiring since approximately .  Was started on metformin at diagnosis, and remains on it at present.,  Is markedly insulin resistant, and regimen at home is U-500 insulin 70 units TID plus lispro 40 units before meals  Was also started on Ozempic 1 month ago, and has been taking 0.5 mg/week since diagnosis--(i.e. did not start at 0.25 mg/week).  Has not had any obvious appetite suppression on the drug, nor any nausea or change in bowel pattern prior to the current illness.  She does not know if she has lost any weight since starting the drug  Says that her blood sugars had been "180" before her current illness, but ashley to the 300 range during the first 2 days after the diarrhea started.  She continued her usual insulin yesterday despite not being able to eat, and started noticing a drop in her blood sugars later in the day.  Took her last dose of U-500 just prior to coming to the ED last evening  Diabetic complications include neuropathy and retinopathy, though she has not seen an ophthalmologist in 2 years  She is followed as an outpatient by Dr. Nisha Joseph in our division.  :    PMH & Surgical Hx:ABDOMINAL PAINHYPOKALEMIA    Complex Care    No pertinent family history in first degree relatives    FH: diabetes mellitus    FH: hypertension    Handoff    MEWS Score    Essential hypertension    Hyperlipidemia    Diabetes    Obesity    Hernia    Abdominal hernia    Pre-eclampsia    Pulmonary embolism    Type 2 diabetes mellitus    History of necrotizing fasciitis    History of creation of ostomy    Suprapubic catheter    H/O: CVA (cerebrovascular accident)    Sepsis, unspecified organism    Abdominal pain    Severe sepsis    Diarrhea    GI bleed    Hypokalemia    Hypoglycemia    CAD (coronary artery disease)    Diabetes    Pulmonary embolism    Prophylactic measure    SIRS (systemic inflammatory response syndrome)    Abdominal pain with vomiting    H/O LEEP    History of D&C    H/O:     H/O ventral hernia repair    H/O exploratory laparotomy    History of creation of ostomy    Home Meds:    Current Meds:  acetaminophen     Tablet .. 650 milliGRAM(s) Oral every 6 hours  apixaban 5 milliGRAM(s) Oral every 12 hours  atorvastatin 80 milliGRAM(s) Oral at bedtime  clopidogrel Tablet 75 milliGRAM(s) Oral daily  dextrose 5% + sodium chloride 0.45%. 1000 milliLiter(s) IV Continuous <Continuous>  dextrose 5%. 1000 milliLiter(s) IV Continuous <Continuous>  dextrose 50% Injectable 25 Gram(s) IV Push once  dextrose Oral Gel 15 Gram(s) Oral once PRN  gabapentin 600 milliGRAM(s) Oral every 8 hours  glucagon  Injectable 1 milliGRAM(s) IntraMuscular once  HYDROmorphone  Injectable 0.5 milliGRAM(s) IV Push every 6 hours PRN  lactobacillus acidophilus 1 Tablet(s) Oral daily  loperamide 2 milliGRAM(s) Oral every 6 hours PRN  metoprolol succinate ER 50 milliGRAM(s) Oral daily  nystatin Powder 1 Application(s) Topical two times a day  oxyCODONE    IR 10 milliGRAM(s) Oral every 6 hours PRN  oxyCODONE    IR 5 milliGRAM(s) Oral every 6 hours PRN  pantoprazole    Tablet 40 milliGRAM(s) Oral two times a day  potassium chloride    Tablet ER 40 milliEquivalent(s) Oral every 4 hours  potassium chloride  10 mEq/100 mL IVPB 10 milliEquivalent(s) IV Intermittent every 1 hour  trimethobenzamide Injectable 200 milliGRAM(s) IntraMuscular every 6 hours      Allergies:  penicillin (Hives)  vancomycin (Anaphylaxis; Hives)  shellfish. (Hives; Anaphylaxis)  clindamycin (Pruritus)  Bactrim I.V. (Rash (Mod to Severe))  fish (Hives; Urticaria)  iodine containing compounds (Hives; Anaphylaxis)  amoxicillin (Short breath; Rash)      ROS:     General: Unsure about changes in weight.  Appetite decreased only with the current illness  Eyes: Recent blurred vision, not necessarily limited to intervals of hyperglycemia.  ENT: Negative for hoarseness or dysphagia   CV: No palpitations, SOB, CP, cough  GI: see HPI  : Urine outputs decreased during the 24 hrs PTZ  MSK: No weakness, joint pain  Skin: No rash, dryness, diaphoresis  Heme: Easy bruising,  Neuro: No HA, dizziness, lightheadedness, numbness tingling  Psych: No Anxiety, Depression    Vital Signs Last 24 Hrs  T(C): 37 (2024 16:22), Max: 37.1 (10 Feb 2024 23:34)  T(F): 98.6 (2024 16:22), Max: 98.8 (10 Feb 2024 23:34)  HR: 86 (2024 16:) (86 - 116)  BP: 131/79 (2024 16:) (117/56 - 184/76)  BP(mean): --  RR: 17 (2024 16:22) (16 - 18)  SpO2: 97% (:) (97% - 98%)    Parameters below as of 2024 08:12  Patient On (Oxygen Delivery Method): room air      Height (cm): 170.2 (02-10 @ 23:34)  Weight (kg): 101.2 (02-10 @ 23:34)  BMI (kg/m2): 34.9 (02-10 @ 23:34)      Constitutional: markedly obese woman in NAD.   HEENT: NCAT, MMM, EOMI, , no proptosis or lid retraction  Neck: no thyromegaly or palpable thyroid nodules   Respiratory: lungs CTAB.  Cardiovascular: regular rhythm, normal S1 and S2, no audible murmurs, no peripheral edema  GI: soft, diffuse guarding, somewhat more prominent on the R side.  Neurology: no tremors,   Skin: no visible rashes/lesions  Psychiatric: AAO x 3, normal affect/mood.  Ext: No edema, normal skin turgor.  DP pulses non-palpable bilaterally but capillary refill is normal ABS:                        11.7   14.06 )-----------( 518      ( 2024 00:15 )             39.3     02-11    134<L>  |  108  |  9   ----------------------------<  273<H>  x    |  20<L>  |  0.73    Ca    7.6<L>      2024 16:57  Mg     1.6         TPro  5.9<L>  /  Alb  3.0<L>  /  TBili  <0.2  /  DBili  0.2  /  AST  13  /  ALT  6<L>  /  AlkPhos  97  02-11    PT/INR - ( 2024 00:15 )   PT: 10.8 sec;   INR: 0.95          PTT - ( 2024 00:15 )  PTT:28.3 sec  Urinalysis Basic - ( 2024 16:57 )      Thyroid Stimulating Hormone, Serum: 0.797 (12-31 @ 12:46)  Thyroid Stimulating Hormone, Serum: 0.305 (06-21 @ 07:24)  Thyroid Stimulating Hormone, Serum: 0.851 (04-28 @ 05:24)      A/P: 43y Female with type 2 insulin-requiring DM, markedly insulin-resistant on U-500 insulin, admitted with hypoglycemia secondary to N/V for the past 24 hours    1) DM: Although the pt has been on Ozempic for the past month, her current illness is likely a viral gastroenteritis.   Her hypoglycemia was clearly due to her having taken all three doses of her U-500 yesterday despite being unable to eat, and the persistance during the first several hours after admission was due to the size of the U-500 dose and the long half-life of this preparation.  At this point it is very difficult to predict her insulin requirements.  --Would D/C the IV dextrose given her current glucose level, but continue dextrose-free IV fluids for purposes of hydration--she is still thirsty, and her ostomy outputs remain high  --She intends to eat supper, and would give her a single dose of 15 units lispro before the meal  --Would give 25 units Lantus tonight  --Start a standing premeal lispro dose of 20 units beginning tomorrow AM  --Will likely give a dose of Lantus again tomorrow AM and stay with BID dosing for flexibility.      Plans discussed with Medicine team

## 2024-02-11 NOTE — H&P ADULT - PROBLEM SELECTOR PLAN 4
In setting of diarrhea and low solid PO intake. Will continue to replete to above with BID BMP. Plan for diarrhea as above One episode of dark and bright red blood without drop in hemoglobin. She was tachycardic but this is in setting of dehydration. Tenderness in lower quadrants on exam without rebound or guarding and abd is soft, CT AP unremarkable. Suspect lower GI tract issue and will likely need scope nonurgently.  - active T+S  - transfuse below 7  - monitor for anymore episodes  - increased PPI to BID

## 2024-02-11 NOTE — H&P ADULT - PROBLEM SELECTOR PLAN 5
Suspect that this is in setting of eating less solids and continuing with her home insulin regimen. Holding insulin for now and will reach out to endocrine with help in her regimen.  - c/w D51/2 NS 90cc/hr for now with q6 f/s In setting of diarrhea and low solid PO intake. Will continue to replete to above with BID BMP. Plan for diarrhea as above

## 2024-02-11 NOTE — ED PROVIDER NOTE - CLINICAL SUMMARY MEDICAL DECISION MAKING FREE TEXT BOX
diffuse adb pain, n/v, loose watery yellow stool in ostomy, pt concerned she again has an infection somewhere. possible viral illness, possible sbo, possible diverticulitis, possible uti  -check labs  -ekg  -ivf  -zofran, morphone  -CT a/p

## 2024-02-11 NOTE — H&P ADULT - PROBLEM SELECTOR PLAN 7
Recent stent in Aug 2023, on plavix and lipitor, no ischemic changes in admission ekg  - c/w home medications

## 2024-02-11 NOTE — H&P ADULT - ASSESSMENT
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

## 2024-02-11 NOTE — CONSULT NOTE ADULT - ASSESSMENT
43y Female with a past medical history of asthma, CVA (residual L>R weakness), PE (on Eliquis), multiple abdominal hernia repeairs, DMII, HTN, HLD, abdominal necrotizing fasciitis (s/p ostomy), frequent UTIs, CAD (s/p PCI in Aug 2023) who presented with increased ostomy output for 3 days. ICU consulted for hypoglycemia.     #Severe sepsis  #Diarrhea  Presenting with 3/4 SIRS criteria and elevated lactate which cleared with IVF. Reports 3 days of increased ostomy output, unclear trigger.   - obtain calprotectin, elastase  - f/u C diff, GI PCR  - add on full RVP  - f/u UAX  - add on GGT as she has elevated ALP  - AC with full dose lovenox, transition back to eliquis when vomiting and diarrhea resolves    #Hypokalemia  likely 2/2 GI losses. No U waves. QTc slightly prolonged.   - additional repletion  - f/u repeat BMP  - f/u AM EKG  - avoid QT prolonging medications    #Hypoglycemia  Reports her sugars have been highly variable since her discharge. Reports adherence with all discharge medications. P/w BG in the 60s despite receiving 1amp d50. Improved to 154 with started D5 1/2 NS gtt.  - endo consult in AM  - hold insulin for now  - c/w D5 1/2 NS  - q6 fingersticks    Dispo: pending    Patient seen and case discussed with intensivist Dr. Arroyo. 43y Female with a past medical history of asthma, CVA (residual L>R weakness), PE (on Eliquis), multiple abdominal hernia repeairs, DMII, HTN, HLD, abdominal necrotizing fasciitis (s/p ostomy), frequent UTIs, CAD (s/p PCI in Aug 2023) who presented with increased ostomy output for 3 days. ICU consulted for hypoglycemia.     #Severe sepsis  #Diarrhea  Presenting with 3/4 SIRS criteria and elevated lactate which cleared with IVF. Reports 3 days of increased ostomy output, unclear trigger.   - obtain calprotectin, elastase  - f/u C diff, GI PCR follow up studies prior to giving antibiotics given she has multiple intolerances and is currently HD stable  - add on full RVP  - f/u UAX  - add on GGT as she has elevated ALP  - AC with full dose lovenox, transition back to eliquis when vomiting and diarrhea resolves    #Hypokalemia  likely 2/2 GI losses. No U waves. QTc slightly prolonged.   - additional repletion  - f/u repeat BMP  - f/u AM EKG  - avoid QT prolonging medications    #Hypoglycemia  Reports her sugars have been highly variable since her discharge. Reports adherence with all discharge medications. P/w BG in the 60s despite receiving 1amp d50. Improved to 154 with started D5 1/2 NS gtt.  - endo consult in AM  - hold insulin for now  - c/w D5 1/2 NS  - q6 fingersticks    Dispo: pending    Patient seen and case discussed with intensivist Dr. Arroyo. 43y Female with a past medical history of asthma, CVA (residual L>R weakness), PE (on Eliquis), multiple abdominal hernia repeairs, DMII, HTN, HLD, abdominal necrotizing fasciitis (s/p ostomy), frequent UTIs, CAD (s/p PCI in Aug 2023) who presented with increased ostomy output for 3 days. ICU consulted for hypoglycemia.     #Severe sepsis  #Diarrhea  Presenting with 3/4 SIRS criteria and elevated lactate which cleared with IVF. Reports 3 days of increased ostomy output, unclear trigger.   - obtain calprotectin, elastase  - f/u C diff, GI PCR follow up studies prior to giving antibiotics given she has multiple intolerances and is currently HD stable  - add on full RVP  - f/u BCx, UA, UCx  - add on GGT as she has elevated ALP  - AC with full dose lovenox, transition back to eliquis when vomiting and diarrhea resolves    #Hypokalemia  likely 2/2 GI losses. No U waves. QTc slightly prolonged.   - additional repletion  - f/u repeat BMP  - f/u AM EKG  - avoid QT prolonging medications    #Hypoglycemia  Reports her sugars have been highly variable since her discharge. Reports adherence with all discharge medications. P/w BG in the 60s despite receiving 1amp d50. Improved to 154 with started D5 1/2 NS gtt.  - endo consult in AM  - hold insulin for now  - c/w D5 1/2 NS  - q6 fingersticks    Dispo: pending    Patient seen and case discussed with intensivist Dr. Arroyo. 43y Female with a past medical history of asthma, CVA (residual L>R weakness), PE (on Eliquis), multiple abdominal hernia repeairs, DMII, HTN, HLD, abdominal necrotizing fasciitis (s/p ostomy), frequent UTIs, CAD (s/p PCI in Aug 2023) who presented with increased ostomy output for 3 days. ICU consulted for hypoglycemia.     #Severe sepsis  #Diarrhea  Presenting with 3/4 SIRS criteria and elevated lactate which cleared with IVF. Reports 3 days of increased ostomy output, unclear trigger.   - obtain calprotectin, elastase  - f/u C diff, GI PCR follow up studies prior to giving antibiotics given she has multiple intolerances and is currently HD stable  - add on full RVP  - f/u BCx, UA, UCx  - add on GGT as she has elevated ALP  - AC with full dose lovenox, transition back to eliquis when vomiting and diarrhea resolves    #Hypokalemia  likely 2/2 GI losses. No U waves. QTc slightly prolonged.   - additional repletion  - f/u repeat BMP  - f/u AM EKG  - avoid QT prolonging medications    #Hypoglycemia  Reports her sugars have been highly variable since her discharge. Reports adherence with all discharge medications. P/w BG in the 60s despite receiving 1amp d50. Improved to 154 with started D5 1/2 NS gtt.  - endo consult in AM  - hold insulin for now  - c/w D5 1/2 NS @ 80cc/hr  - q6 fingersticks    Dispo: pending    Patient seen and case discussed with intensivist Dr. Arroyo. 43y Female with a past medical history of asthma, CVA (residual L>R weakness), PE (on Eliquis), multiple abdominal hernia repeairs, DMII, HTN, HLD, abdominal necrotizing fasciitis (s/p ostomy), frequent UTIs, CAD (s/p PCI in Aug 2023) who presented with increased ostomy output for 3 days. ICU consulted for hypoglycemia.     #Severe sepsis  #Diarrhea  Presenting with 3/4 SIRS criteria and elevated lactate which cleared with IVF. Reports 3 days of increased ostomy output, unclear trigger.   - obtain calprotectin, elastase  - f/u C diff, GI PCR follow up studies prior to giving antibiotics given she has multiple intolerances and is currently HD stable  - add on full RVP  - f/u BCx, UA, UCx  - add on GGT as she has elevated ALP  - AC with full dose lovenox, transition back to eliquis when vomiting and diarrhea resolves    #Hypokalemia  likely 2/2 GI losses. No U waves. QTc slightly prolonged.   - additional repletion  - f/u repeat BMP  - f/u AM EKG  - avoid QT prolonging medications    #Hypoglycemia  Reports her sugars have been highly variable since her discharge. Reports adherence with all discharge medications. P/w BG in the 60s despite receiving 1amp d50. Improved to 154 with started D5 1/2 NS gtt.  - endo consult in AM  - hold insulin for now  - c/w D5 1/2 NS @ 60cc/hr  - q6 fingersticks    Dispo: pending    Patient seen and case discussed with intensivist Dr. Arroyo. 43y Female with a past medical history of asthma, CVA (residual L>R weakness), PE (on Eliquis), multiple abdominal hernia repeairs, DMII, HTN, HLD, abdominal necrotizing fasciitis (s/p ostomy), frequent UTIs, CAD (s/p PCI in Aug 2023) who presented with increased ostomy output for 3 days. ICU consulted for hypoglycemia.     #Diarrhea  Presenting with 3/4 SIRS criteria and elevated lactate which cleared with IVF. Reports 3 days of increased ostomy output, unclear trigger. DDx includes sepsis but pt well appearing and nontoxic, will consider noninfectious etiology as well.  - obtain calprotectin, elastase  - f/u C diff, GI PCR follow up studies prior to giving antibiotics given she has multiple intolerances and is currently HD stable  - add on full RVP  - f/u BCx, UA, UCx  - add on GGT as she has elevated ALP  - AC with full dose lovenox, transition back to eliquis when vomiting and diarrhea resolves    #Hypokalemia  likely 2/2 GI losses. No U waves. QTc slightly prolonged.   - additional repletion  - f/u repeat BMP  - f/u AM EKG  - avoid QT prolonging medications    #Hypoglycemia  Reports her sugars have been highly variable since her discharge. Reports adherence with all discharge medications. P/w BG in the 60s despite receiving 1amp d50. Improved to 154 with started D5 1/2 NS gtt.  - endo consult in AM  - hold insulin for now  - c/w D5 1/2 NS @ 60cc/hr  - q6 fingersticks    Dispo: pending    Patient seen and case discussed with intensivist Dr. Arroyo. 43y Female with a past medical history of asthma, CVA (residual L>R weakness), PE (on Eliquis), multiple abdominal hernia repeairs, DMII, HTN, HLD, abdominal necrotizing fasciitis (s/p ostomy), frequent UTIs, CAD (s/p PCI in Aug 2023) who presented with increased ostomy output for 3 days. ICU consulted for hypoglycemia.     #Diarrhea  Presenting with 3/4 SIRS criteria and elevated lactate which cleared with IVF. Reports 3 days of increased ostomy output, unclear trigger. DDx includes sepsis but pt well appearing and nontoxic, will consider noninfectious etiology as well.  - obtain calprotectin, elastase  - f/u C diff, GI PCR follow up studies prior to giving antibiotics given she has multiple intolerances and is currently HD stable  - add on full RVP  - f/u BCx, UA, UCx  - add on GGT as she has elevated ALP  - AC with full dose lovenox, transition back to eliquis when vomiting and diarrhea resolves    #Hypokalemia  likely 2/2 GI losses. No U waves. QTc slightly prolonged.   - additional repletion  - f/u repeat BMP  - f/u AM EKG  - avoid QT prolonging medications    #Hypoglycemia  Reports her sugars have been highly variable since her discharge. Reports adherence with all discharge medications. P/w BG in the 60s despite receiving 1amp d50. Improved to 154 with started D5 1/2 NS gtt.  - endo consult in AM  - hold insulin for now  - c/w D5 1/2 NS @ 60cc/hr  - q6 fingersticks    Dispo: Does not require telemetry monitoring    Patient seen and case discussed with intensivist Dr. Arroyo. 43y Female with a past medical history of asthma, CVA (residual L>R weakness), PE (on Eliquis), multiple abdominal hernia repeairs, DMII, HTN, HLD, abdominal necrotizing fasciitis (s/p ostomy), frequent UTIs, CAD (s/p PCI in Aug 2023) who presented with increased ostomy output for 3 days. ICU consulted for hypoglycemia.     #Diarrhea  Presenting with 3/4 SIRS criteria and elevated lactate which cleared with IVF. Reports 3 days of increased ostomy output, unclear trigger. DDx includes sepsis but pt well appearing and nontoxic, will consider noninfectious etiology as well.  - obtain calprotectin, elastase  - f/u C diff, GI PCR follow up studies prior to giving antibiotics given she has multiple intolerances and is currently HD stable  - add on full RVP  - f/u BCx, UA, UCx  - add on GGT as she has elevated ALP  - AC with full dose lovenox, transition back to eliquis when vomiting and diarrhea resolves    #Hypokalemia  likely 2/2 GI losses. No U waves. QTc slightly prolonged. K 3 > 3.2 with repletion.  - additional K repletion   - f/u AM EKG  - avoid QT prolonging medications    #Hypoglycemia  Reports her sugars have been highly variable since her discharge. Reports adherence with all discharge medications. P/w BG in the 60s despite receiving 1amp d50. Improved to 154 with started D5 1/2 NS gtt.  - endo consult in AM  - hold insulin for now  - c/w D5 1/2 NS @ 60cc/hr  - q6 fingersticks    Dispo: Does not require telemetry monitoring    Patient seen and case discussed with intensivist Dr. Arroyo.

## 2024-02-11 NOTE — ED ADULT NURSE REASSESSMENT NOTE - NS ED NURSE REASSESS COMMENT FT1
Pt states "morphine does not work on me. You can check my chart." MD notified for medication change.

## 2024-02-11 NOTE — H&P ADULT - NSHPPHYSICALEXAM_GEN_ALL_CORE
T(C): 36.8 (02-11-24 @ 08:12), Max: 37.1 (02-10-24 @ 23:34)  HR: 92 (02-11-24 @ 08:12) (92 - 116)  BP: 157/86 (02-11-24 @ 08:12) (117/56 - 184/76)  RR: 18 (02-11-24 @ 08:12) (16 - 18)  SpO2: 98% (02-11-24 @ 08:12) (98% - 98%)    GEN - Obese F sitting on edge of bed, comfortable appearing  HEENT - MMM, no scleral icterus or conjunctival pallor  RESP - CTA BL, not on supplemental O2.  CARDIO - tachycardic to 96, regular, no murmur  ABD - mildly distended, well healed scar, ostomy in lower quadrant with light brown watery output, erythema around ostomy site without leak or purulent drainage. abd is soft with tenderness in lower quadrants. No rebound or guarding.  Ext - No ERIC. no signs of venous/arterial stasis ulcers   Neuro - no facial droop, tongue midline, moves all extremities spontaneously   Skin - clean, dry, intact. no rashes or lesions.    Psych- AAOx3. appropriate behaviour. attentive. normal affect.

## 2024-02-11 NOTE — CONSULT NOTE ADULT - NS ATTEST RISK PROBLEM GEN_ALL_CORE FT
Pt admitted with hypoglycemia, but quite insulin resistant and difficult to predict insulin requirements

## 2024-02-11 NOTE — H&P ADULT - PROBLEM SELECTOR PLAN 6
Recent stent in Aug 2023, on plavix and lipitor, no ischemic changes in admission ekg  - c/w home medications Suspect that this is in setting of eating less solids and continuing with her home insulin regimen. Holding insulin for now and will reach out to endocrine with help in her regimen.  - c/w D51/2 NS 90cc/hr for now with q6 f/s

## 2024-02-11 NOTE — CONSULT NOTE ADULT - SUBJECTIVE AND OBJECTIVE BOX
CCM SERVICE CONSULTATION NOTE    Consult Reason: hypoglycemia and hypokalemia    CC: increased ostomy output    HPI:      ROS:  Otherwise negative, except as specified in HPI.    ALLERGIES:    MEDICATIONS:    VITAL SIGNS:  ICU Vital Signs Last 24 Hrs  T(C): 37.1 (10 Feb 2024 23:34), Max: 37.1 (10 Feb 2024 23:34)  T(F): 98.8 (10 Feb 2024 23:34), Max: 98.8 (10 Feb 2024 23:34)  HR: 116 (10 Feb 2024 23:34) (116 - 116)  BP: 117/56 (11 Feb 2024 01:19) (117/56 - 184/76)  BP(mean): --  ABP: --  ABP(mean): --  RR: 17 (10 Feb 2024 23:34) (17 - 17)  SpO2: 98% (10 Feb 2024 23:34) (98% - 98%)    O2 Parameters below as of 10 Feb 2024 23:34  Patient On (Oxygen Delivery Method): room air          CAPILLARY BLOOD GLUCOSE      POCT Blood Glucose.: 154 mg/dL (11 Feb 2024 04:07)      PHYSICAL EXAM:  Constitutional: resting comfortably in bed, NAD  HEENT: NC/AT; PERRL, anicteric sclera; dry mm  Neck: supple, no appreciable JVD  Respiratory: CTA B/L, no W/R/R  Cardiovascular: +S1/S2, RRR  Gastrointestinal: abdomen soft, NT/ND; ostomy in place with light brown output  Extremities: WWP; no edema  Dermatologic: skin normal color and turgor; no visible rashes  Neurological: AAOx3, no FND    LABS:                        11.7   14.06 )-----------( 518      ( 11 Feb 2024 00:15 )             39.3     02-11    140  |  103  |  12  ----------------------------<  66<L>  3.0<L>   |  24  |  0.64    Ca    9.2      11 Feb 2024 00:15  Mg     1.6     02-11    TPro  7.3  /  Alb  3.6  /  TBili  <0.2  /  DBili  x   /  AST  11  /  ALT  8<L>  /  AlkPhos  128<H>  02-11    PT/INR - ( 11 Feb 2024 00:15 )   PT: 10.8 sec;   INR: 0.95          PTT - ( 11 Feb 2024 00:15 )  PTT:28.3 sec  Lactate, Blood: 1.9 mmol/L (02-11-24 @ 04:03)  Lactate, Blood: 3.5 mmol/L (02-11-24 @ 00:15)        Urinalysis Basic - ( 11 Feb 2024 00:15 )    Color: x / Appearance: x / SG: x / pH: x  Gluc: 66 mg/dL / Ketone: x  / Bili: x / Urobili: x   Blood: x / Protein: x / Nitrite: x   Leuk Esterase: x / RBC: x / WBC x   Sq Epi: x / Non Sq Epi: x / Bacteria: x      Blood Gas Profile w/Lytes - Venous: Performed in Lab (02-11-24 @ 00:10)      EKG: Reviewed.    RADIOLOGY & ADDITIONAL TESTS: Reviewed. [facility] SERVICE CONSULTATION NOTE    Consult Reason: hypoglycemia and hypokalemia    CC: increased ostomy output    HPI: 43y Female with a past medical history of asthma, CVA (residual L>R weakness), PE (on Eliquis), multiple abdominal hernia repeairs, DMII, HTN, HLD, abdominal necrotizing fasciitis (s/p ostomy), frequent UTIs, CAD (s/p PCI in Aug 2023) who presented with increased ostomy output for 3 days. She had 1 episode of NBNB emesis prompting her to come to the ED. No fevers, chills, chest pain, SOB. Says she has been having burning on urinary with decreased urinary frequency. Also reporting abdominal pain.       ROS:  Otherwise negative, except as specified in HPI.    ALLERGIES:    MEDICATIONS:    VITAL SIGNS:  ICU Vital Signs Last 24 Hrs  T(C): 37.1 (10 Feb 2024 23:34), Max: 37.1 (10 Feb 2024 23:34)  T(F): 98.8 (10 Feb 2024 23:34), Max: 98.8 (10 Feb 2024 23:34)  HR: 116 (10 Feb 2024 23:34) (116 - 116)  BP: 117/56 (11 Feb 2024 01:19) (117/56 - 184/76)  BP(mean): --  ABP: --  ABP(mean): --  RR: 17 (10 Feb 2024 23:34) (17 - 17)  SpO2: 98% (10 Feb 2024 23:34) (98% - 98%)    O2 Parameters below as of 10 Feb 2024 23:34  Patient On (Oxygen Delivery Method): room air          CAPILLARY BLOOD GLUCOSE      POCT Blood Glucose.: 154 mg/dL (11 Feb 2024 04:07)      PHYSICAL EXAM:  Constitutional: resting comfortably in bed, NAD  HEENT: NC/AT; PERRL, anicteric sclera; dry mm  Neck: supple, no appreciable JVD  Respiratory: CTA B/L, no W/R/R  Cardiovascular: +S1/S2, RRR  Gastrointestinal: abdomen soft, NT/ND; ostomy in place with light brown output  Extremities: WWP; no edema  Dermatologic: skin normal color and turgor; no visible rashes  Neurological: AAOx3, no FND    LABS:                        11.7   14.06 )-----------( 518      ( 11 Feb 2024 00:15 )             39.3     02-11    140  |  103  |  12  ----------------------------<  66<L>  3.0<L>   |  24  |  0.64    Ca    9.2      11 Feb 2024 00:15  Mg     1.6     02-11    TPro  7.3  /  Alb  3.6  /  TBili  <0.2  /  DBili  x   /  AST  11  /  ALT  8<L>  /  AlkPhos  128<H>  02-11    PT/INR - ( 11 Feb 2024 00:15 )   PT: 10.8 sec;   INR: 0.95          PTT - ( 11 Feb 2024 00:15 )  PTT:28.3 sec  Lactate, Blood: 1.9 mmol/L (02-11-24 @ 04:03)  Lactate, Blood: 3.5 mmol/L (02-11-24 @ 00:15)        Urinalysis Basic - ( 11 Feb 2024 00:15 )    Color: x / Appearance: x / SG: x / pH: x  Gluc: 66 mg/dL / Ketone: x  / Bili: x / Urobili: x   Blood: x / Protein: x / Nitrite: x   Leuk Esterase: x / RBC: x / WBC x   Sq Epi: x / Non Sq Epi: x / Bacteria: x      Blood Gas Profile w/Lytes - Venous: Performed in Lab (02-11-24 @ 00:10)      EKG: Reviewed.    RADIOLOGY & ADDITIONAL TESTS: Reviewed. [facility] SERVICE CONSULTATION NOTE    Consult Reason: hypoglycemia and hypokalemia    CC: increased ostomy output    HPI: 43y Female with a past medical history of asthma, CVA (residual L>R weakness), PE (on Eliquis), multiple abdominal hernia repeairs, DMII, HTN, HLD, abdominal necrotizing fasciitis (s/p ostomy), frequent UTIs, CAD (s/p PCI in Aug 2023) who presented with increased ostomy output for 3 days. She had 1 episode of NBNB emesis prompting her to come to the ED. No fevers, chills, chest pain, SOB. Says she has been having burning on urinary with decreased urinary frequency. Also reporting abdominal pain.     In the ED, afebrile. , /76. Received d50 amp x2, 2L NS, 1mg dilaudid, 10meq K, started on D5 1/2 NS gtt  CT A&P performed.      ALLERGIES:    MEDICATIONS:    VITAL SIGNS:  ICU Vital Signs Last 24 Hrs  T(C): 37.1 (10 Feb 2024 23:34), Max: 37.1 (10 Feb 2024 23:34)  T(F): 98.8 (10 Feb 2024 23:34), Max: 98.8 (10 Feb 2024 23:34)  HR: 116 (10 Feb 2024 23:34) (116 - 116)  BP: 117/56 (11 Feb 2024 01:19) (117/56 - 184/76)  BP(mean): --  ABP: --  ABP(mean): --  RR: 17 (10 Feb 2024 23:34) (17 - 17)  SpO2: 98% (10 Feb 2024 23:34) (98% - 98%)    O2 Parameters below as of 10 Feb 2024 23:34  Patient On (Oxygen Delivery Method): room air          CAPILLARY BLOOD GLUCOSE      POCT Blood Glucose.: 154 mg/dL (11 Feb 2024 04:07)      PHYSICAL EXAM:  Constitutional: resting comfortably in bed, NAD  HEENT: NC/AT; PERRL, anicteric sclera; dry mm  Neck: supple, no appreciable JVD  Respiratory: CTA B/L, no W/R/R  Cardiovascular: +S1/S2, RRR  Gastrointestinal: abdomen soft, NT/ND; ostomy in place with light brown output  Extremities: WWP; no edema  Dermatologic: skin normal color and turgor; no visible rashes  Neurological: AAOx3, no FND    LABS:                        11.7   14.06 )-----------( 518      ( 11 Feb 2024 00:15 )             39.3     02-11    140  |  103  |  12  ----------------------------<  66<L>  3.0<L>   |  24  |  0.64    Ca    9.2      11 Feb 2024 00:15  Mg     1.6     02-11    TPro  7.3  /  Alb  3.6  /  TBili  <0.2  /  DBili  x   /  AST  11  /  ALT  8<L>  /  AlkPhos  128<H>  02-11    PT/INR - ( 11 Feb 2024 00:15 )   PT: 10.8 sec;   INR: 0.95          PTT - ( 11 Feb 2024 00:15 )  PTT:28.3 sec  Lactate, Blood: 1.9 mmol/L (02-11-24 @ 04:03)  Lactate, Blood: 3.5 mmol/L (02-11-24 @ 00:15)        Urinalysis Basic - ( 11 Feb 2024 00:15 )    Color: x / Appearance: x / SG: x / pH: x  Gluc: 66 mg/dL / Ketone: x  / Bili: x / Urobili: x   Blood: x / Protein: x / Nitrite: x   Leuk Esterase: x / RBC: x / WBC x   Sq Epi: x / Non Sq Epi: x / Bacteria: x      Blood Gas Profile w/Lytes - Venous: Performed in Lab (02-11-24 @ 00:10)      EKG: Reviewed.    RADIOLOGY & ADDITIONAL TESTS: Reviewed. [facility] SERVICE CONSULTATION NOTE    Consult Reason: hypoglycemia and hypokalemia    CC: increased ostomy output    HPI: 43y Female with a past medical history of asthma, CVA (residual L>R weakness), PE (on Eliquis), multiple abdominal hernia repeairs, DMII, HTN, HLD, abdominal necrotizing fasciitis (s/p ostomy), frequent UTIs, CAD (s/p PCI in Aug 2023) who presented with increased ostomy output for 3 days. She had 1 episode of NBNB emesis prompting her to come to the ED. No fevers, chills, chest pain, SOB. Says she has been having burning on urinary with decreased urinary frequency. Also reporting abdominal pain.     In the ED, afebrile. , /76. Received d50 amp x2, 2L NS, 1mg dilaudid, 10meq K, started on D5 1/2 NS gtt  CT A&P performed.      ALLERGIES:    MEDICATIONS:    VITAL SIGNS:  ICU Vital Signs Last 24 Hrs  T(C): 37.1 (10 Feb 2024 23:34), Max: 37.1 (10 Feb 2024 23:34)  T(F): 98.8 (10 Feb 2024 23:34), Max: 98.8 (10 Feb 2024 23:34)  HR: 116 (10 Feb 2024 23:34) (116 - 116)  BP: 117/56 (11 Feb 2024 01:19) (117/56 - 184/76)  BP(mean): --  ABP: --  ABP(mean): --  RR: 17 (10 Feb 2024 23:34) (17 - 17)  SpO2: 98% (10 Feb 2024 23:34) (98% - 98%)    O2 Parameters below as of 10 Feb 2024 23:34  Patient On (Oxygen Delivery Method): room air          CAPILLARY BLOOD GLUCOSE      POCT Blood Glucose.: 154 mg/dL (11 Feb 2024 04:07)      PHYSICAL EXAM:  Constitutional: resting comfortably in bed, NAD  HEENT: NC/AT; PERRL, anicteric sclera; dry mm  Respiratory: CTA B/L, no W/R/R  Cardiovascular: +S1/S2, RRR  Gastrointestinal: abdomen soft, NT/ND; ostomy in place with light brown output  Extremities: WWP; no edema  Neurological: AAOx3, no FND    LABS:                        11.7   14.06 )-----------( 518      ( 11 Feb 2024 00:15 )             39.3     02-11    140  |  103  |  12  ----------------------------<  66<L>  3.0<L>   |  24  |  0.64    Ca    9.2      11 Feb 2024 00:15  Mg     1.6     02-11    TPro  7.3  /  Alb  3.6  /  TBili  <0.2  /  DBili  x   /  AST  11  /  ALT  8<L>  /  AlkPhos  128<H>  02-11    PT/INR - ( 11 Feb 2024 00:15 )   PT: 10.8 sec;   INR: 0.95          PTT - ( 11 Feb 2024 00:15 )  PTT:28.3 sec  Lactate, Blood: 1.9 mmol/L (02-11-24 @ 04:03)  Lactate, Blood: 3.5 mmol/L (02-11-24 @ 00:15)        Urinalysis Basic - ( 11 Feb 2024 00:15 )    Color: x / Appearance: x / SG: x / pH: x  Gluc: 66 mg/dL / Ketone: x  / Bili: x / Urobili: x   Blood: x / Protein: x / Nitrite: x   Leuk Esterase: x / RBC: x / WBC x   Sq Epi: x / Non Sq Epi: x / Bacteria: x      Blood Gas Profile w/Lytes - Venous: Performed in Lab (02-11-24 @ 00:10)      EKG: Reviewed.    RADIOLOGY & ADDITIONAL TESTS: Reviewed.

## 2024-02-11 NOTE — H&P ADULT - PROBLEM SELECTOR PLAN 1
Tachycardia, leukocytosis, and elevated lactate without clear source of infection. Although she has diarrhea, GI CPR and c diff negative, and it is in the setting of switching to mostly liquids and having diarrhea. No fever and WBC increase without L shift, CBC looks concentrated compared to old CBC. Negative UA. CT AP unremarkable. See assessment below for diarrhea and vomiting. Lactate resolved and HR came down after fluid administration. Will hold abx for now without clear source of infection

## 2024-02-11 NOTE — H&P ADULT - PROBLEM SELECTOR PLAN 2
Increased watery output from ostomy, associated with switching to liquids i/s/o nausea. Suspect that watery output is from having mostly liquids over the past few days. Abd with mild tenderness in lower quadrants without guarding or rebound. GI PCR negative and c diff negative. She did have one episode of blood dark mixed with bright red blood that resolved (see assessment below).   - encourage solid PO intake with antinausea medication administration-->tigan for now i/s/o QTc of 500  - loperamide prn for diarrhea, unlikely infectious etiology

## 2024-02-11 NOTE — H&P ADULT - PROBLEM SELECTOR PLAN 8
Last here in January with change in regimen to humulin 70U TID, admelog 40U TID before meals, metformin 1g qd  - holding for now i/s/o hypoglycemia, will discuss with endocrine

## 2024-02-11 NOTE — ED PROVIDER NOTE - CARE PLAN
Principal Discharge DX:	Abdominal pain   1 Principal Discharge DX:	Abdominal pain  Secondary Diagnosis:	Hypokalemia   Principal Discharge DX:	Abdominal pain  Secondary Diagnosis:	Hypokalemia  Secondary Diagnosis:	Multiple episodes of hypoglycemia

## 2024-02-11 NOTE — H&P ADULT - ATTENDING COMMENTS
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    #SIRS (systemic inflammatory response syndrome)  #Lactic acidosis (resolved)   #Diarrhea   #Abdominal discomfort   -Pt w/ tachycardia, leukocytosis, and elevated lactate without clear source of infection.   -Pt's GI CPR and c diff negative.  -Pt afebrile and leukocytosis without L shift,   -CBC looks hemoconcentrated compared prior CBC. Will repeat CBC   - Pt's U/A was Negative, Covid and influenza negative  -Pt's CT w/o contrast AP unremarkable.( Pt w/ iodine allergy)  - Will hold abx for now without clear source of infection.  -Will f/u pancreatic elastase and calprotectin stool     #Abdominal discomfort   #Nausea and vomiting  -Pt's lipase negative   -Pt tolerating PO   -Pt's CT w/o contrast AP unremarkable.( Pt w/ iodine allergy)  -Continue serial abdominal exam   -Continue pain medication prn see Istop documented in chart note   -Continue antiemetic prn     #Type I DM  w/ Hypoglycemia   - Will home diabetes medication for now   - Will continue D5 1/2NS for now and monitor FS  -Endocrine     #Hx of CAD (PCI s/p NAT 7/2027 and 8/2027)  #Hx of CVA  - Continue Statin and plavix 75mg PO daily   - Continue Metoprolol 50mg PO daily     #HX of PE (4/2023, on eliquis)  -Continue eliquis     #DVT prop  -Pt on systemic AC    #DISPO  -Will d/c once clinically improved 44 y/o F PMH CVA (residual L>R weakness), PE (on Eliquis), multiple abdominal hernia repairs, 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    #SIRS (systemic inflammatory response syndrome)  #Lactic acidosis (resolved)   #Diarrhea   #Abdominal discomfort   -Pt w/ tachycardia, leukocytosis, and elevated lactate without clear source of infection.   -Pt's GI CPR and c diff negative.  -Pt afebrile and leukocytosis without L shift,   -CBC looks hemoconcentrated compared prior CBC. Will repeat CBC   - Pt's U/A was Negative, Covid and influenza negative  -Pt's CT w/o contrast AP unremarkable.( Pt w/ iodine allergy); Per chart review the pt had CT abd/pelvis in Dec 2023 with IV contrast which did not show any fluid collection or necrotizing fasiciits at that time however it showed left adnexal mass likely hemorrhagic cyst and they recommended nonemergent pelvis US.   - Will hold abx for now without clear source of infection.  -Will f/u pancreatic elastase and calprotectin stool     #Abdominal discomfort   #Nausea and vomiting  -Pt's lipase negative   -Pt tolerating PO   -Pt's CT w/o contrast AP unremarkable.( Pt w/ iodine allergy); Per chart review the pt had CT abd/pelvis in Dec 2023 with IV contrast which did not show any fluid collection or necrotizing fasiciits at that time however it showed left adnexal mass likely hemorrhagic cyst and they recommended nonemergent pelvis US.   -Continue serial abdominal exam and exam worsens then will consider CT A/P w/contrast   -Continue pain medication prn see Istop documented in chart note   -Continue antiemetic prn     #Type I DM  w/ Hypoglycemia   - Will home diabetes medication for now   - Will continue D5 1/2NS for now and monitor FS  -Endocrine     #Hx of CAD (PCI s/p NAT 7/2027 and 8/2027)  #Hx of CVA  - Continue Statin and plavix 75mg PO daily   - Continue Metoprolol 50mg PO daily     #HX of PE (4/2023, on eliquis)  -Continue eliquis     #DVT prop  -Pt on systemic AC    #DISPO  -Will d/c once clinically improved 42 y/o F PMH CVA (residual L>R weakness), PE (on Eliquis), multiple abdominal hernia repairs, 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    #SIRS (systemic inflammatory response syndrome)  #Lactic acidosis (resolved)   #Diarrhea   #Abdominal discomfort   -Pt w/ tachycardia, leukocytosis, and elevated lactate without clear source of infection.   -Pt's GI CPR and c diff negative.  -Pt afebrile and leukocytosis without L shift,   -CBC looks hemoconcentrated compared prior CBC. Will repeat CBC   - Pt's U/A was Negative, Covid and influenza negative  -Pt's CT w/o contrast AP unremarkable.( Pt w/ iodine allergy); Per chart review the pt had CT abd/pelvis in Dec 2023 with IV contrast which did not show any fluid collection or necrotizing fasiciits at that time however it showed left adnexal mass likely hemorrhagic cyst and they recommended nonemergent pelvis US.   - Will hold abx for now without clear source of infection.  -Will f/u pancreatic elastase and calprotectin stool     #Abdominal discomfort   #Nausea and vomiting  -Pt's lipase negative   -Pt tolerating PO   -Pt's CT w/o contrast AP unremarkable.( Pt w/ iodine allergy); Per chart review the pt had CT abd/pelvis in Dec 2023 with IV contrast which did not show any fluid collection or necrotizing fasiciits at that time however it showed left adnexal mass likely hemorrhagic cyst and they recommended nonemergent pelvis US.   -Continue serial abdominal exam and exam worsens then will consider CT A/P w/contrast   -Continue pain medication prn see Istop documented in chart note   -Continue antiemetic prn     #Type I DM  w/ Hypoglycemia   - Will home diabetes medication for now   - Will continue D5 1/2NS for now and monitor FS      #Hx of CAD (PCI s/p NAT 7/2027 and 8/2027)  #Hx of CVA  - Continue Statin and plavix 75mg PO daily   - Continue Metoprolol 50mg PO daily     #HX of PE (4/2023, on eliquis)  -Continue eliquis     #DVT prop  -Pt on systemic AC    #DISPO  -Will d/c once clinically improved

## 2024-02-11 NOTE — H&P ADULT - PROBLEM SELECTOR PLAN 3
One episode of dark and bright red blood without drop in hemoglobin. She was tachycardic but this is in setting of dehydration. Tenderness in lower quadrants on exam without rebound or guarding and abd is soft, CT AP unremarkable. Suspect lower GI tract issue and will likely need scope nonurgently.  - active T+S  - transfuse below 7  - monitor for anymore episodes  - increased PPI to BID Pressure like pain mostly in lower quadrants associated with nausea, NBNB vomiting (1 episode), and increased ostomy output. 1 small episode of dark and bright red blood in output. Tachy but afebrile nad generalized tenderness worse in lower quadrants, santoro's sign negative, no guarding or rigidity. Concentrated CBC. Lower suspicion for infection. Lipase negative, EKG without ischemic changes. CT unremarkable. Considering gastroparesis as the inciting factor for these events given DM and neuropathy, but this would not explain blood. Will repeat ekg and start reglan if qtc is shorter which will help nausea and hopefully GI motility. Will treat pain symptomatically, at home on oxycodone 5mg q6 prn for her back pain, will continue here with istop.  - repeat ekg-->start reglan  - tylenol 650mg Q6h  - oxycodone 5mg QID prn for moderate pain  - oxycodone 10mg QID for severe pain Pressure like pain mostly in lower quadrants associated with nausea, NBNB vomiting (1 episode), and increased ostomy output. 1 small episode of dark and bright red blood in output. Tachy but afebrile nad generalized tenderness worse in lower quadrants, santoro's sign negative, no guarding or rigidity. Concentrated CBC. Lower suspicion for infection. Lipase negative, EKG without ischemic changes. CT unremarkable. Although lactate was elevated, it cleared with fluids and patient is comfortable appearing on exam, lower suspicion for acute mesenteric ischemia. Could have chronic mesenteric ischemia given CAD and worsened pain over last few days while being volume down. Also considering gastroparesis as the inciting factor for these events given DM and neuropathy, but this would not explain blood and strange she would have increased output with reduced motility. Will repeat ekg and start reglan if qtc is shorter which will help nausea and encourage solid food intake. Will treat pain symptomatically, at home on oxycodone 5mg q6 prn for her back pain, will continue here with istop.  - repeat ekg-->start reglan  - tylenol 650mg Q6h  - oxycodone 5mg QID prn for moderate pain  - oxycodone 10mg QID for severe pain

## 2024-02-11 NOTE — ED PROVIDER NOTE - PROGRESS NOTE DETAILS
elevated lactate - will repeat after ivfs will replete K and Mg Fabel: no acute abnormalities on CT scan.  pt had an amp of D50 apx 2 hours ago; has recurrent hypoglycemia to 68 even after the amp of D50 and eating glo crackers.  Will admit. Fabel: no acute abnormalities on CT scan.  pt had an amp of D50 apx 2 hours ago; has recurrent hypoglycemia to 68 even after the amp of D50, orange juice, and crackers.  Will start dextrose drip admit. Fabel: no acute abnormalities on CT scan.  pt had an amp of D50 apx 2 hours ago; has recurrent hypoglycemia to 61 even after the amp of D50, orange juice, and crackers.  Will start dextrose drip admit. IV D5-0.45 NS stopped 30 minutes ago; sugar dropped from 223 down to 71 in that time period.  Restarted D5-0.45 NS at 75/hr. IV D5-0.45 NS stopped 30 minutes ago; sugar dropped from 223 down to 72 in that time period.  Restarted D5-0.45 NS at 75/hr. MICU deemed pt appropriate for RMF.  To continue IV fluids and get fingerstick glucose Q6H.

## 2024-02-11 NOTE — ED PROVIDER NOTE - OBJECTIVE STATEMENT
43F hx asthma, cva (residual weakness L>R, uses rollator walker), PE (on eliquis), DM, htn, high chol, abdominal nec fasc (s/p ostomy), c/o abd pain. pt states worsening pain tonight. states saw some blood in ostomy bag. now extremely watery stool. states she also vomited similarly yellow substance. pt with frequent UTIs (in past d/t suprapubic cath, since removed 6/23).

## 2024-02-12 ENCOUNTER — NON-APPOINTMENT (OUTPATIENT)
Age: 44
End: 2024-02-12

## 2024-02-12 LAB
APPEARANCE UR: CLEAR — SIGNIFICANT CHANGE UP
BACTERIA # UR AUTO: NEGATIVE /HPF — SIGNIFICANT CHANGE UP
BILIRUB UR-MCNC: NEGATIVE — SIGNIFICANT CHANGE UP
CAST: 1 /LPF — SIGNIFICANT CHANGE UP (ref 0–4)
COLOR SPEC: YELLOW — SIGNIFICANT CHANGE UP
DIFF PNL FLD: ABNORMAL
GLUCOSE BLDC GLUCOMTR-MCNC: 106 MG/DL — HIGH (ref 70–99)
GLUCOSE BLDC GLUCOMTR-MCNC: 107 MG/DL — HIGH (ref 70–99)
GLUCOSE BLDC GLUCOMTR-MCNC: 110 MG/DL — HIGH (ref 70–99)
GLUCOSE BLDC GLUCOMTR-MCNC: 131 MG/DL — HIGH (ref 70–99)
GLUCOSE BLDC GLUCOMTR-MCNC: 167 MG/DL — HIGH (ref 70–99)
GLUCOSE BLDC GLUCOMTR-MCNC: 71 MG/DL — SIGNIFICANT CHANGE UP (ref 70–99)
GLUCOSE BLDC GLUCOMTR-MCNC: 99 MG/DL — SIGNIFICANT CHANGE UP (ref 70–99)
GLUCOSE UR QL: NEGATIVE MG/DL — SIGNIFICANT CHANGE UP
KETONES UR-MCNC: NEGATIVE MG/DL — SIGNIFICANT CHANGE UP
LEUKOCYTE ESTERASE UR-ACNC: ABNORMAL
NITRITE UR-MCNC: NEGATIVE — SIGNIFICANT CHANGE UP
PH UR: 6.5 — SIGNIFICANT CHANGE UP (ref 5–8)
PROT UR-MCNC: NEGATIVE MG/DL — SIGNIFICANT CHANGE UP
RBC CASTS # UR COMP ASSIST: 4 /HPF — SIGNIFICANT CHANGE UP (ref 0–4)
SP GR SPEC: 1.01 — SIGNIFICANT CHANGE UP (ref 1–1.03)
SQUAMOUS # UR AUTO: 1 /HPF — SIGNIFICANT CHANGE UP (ref 0–5)
UROBILINOGEN FLD QL: 0.2 MG/DL — SIGNIFICANT CHANGE UP (ref 0.2–1)
WBC UR QL: 6 /HPF — HIGH (ref 0–5)

## 2024-02-12 PROCEDURE — 99233 SBSQ HOSP IP/OBS HIGH 50: CPT | Mod: GC

## 2024-02-12 PROCEDURE — 99232 SBSQ HOSP IP/OBS MODERATE 35: CPT

## 2024-02-12 RX ORDER — OXYCODONE HYDROCHLORIDE 5 MG/1
10 TABLET ORAL EVERY 6 HOURS
Refills: 0 | Status: DISCONTINUED | OUTPATIENT
Start: 2024-02-12 | End: 2024-02-13

## 2024-02-12 RX ORDER — OXYCODONE HYDROCHLORIDE 5 MG/1
5 TABLET ORAL EVERY 6 HOURS
Refills: 0 | Status: DISCONTINUED | OUTPATIENT
Start: 2024-02-12 | End: 2024-02-13

## 2024-02-12 RX ORDER — SODIUM CHLORIDE 9 MG/ML
1000 INJECTION, SOLUTION INTRAVENOUS
Refills: 0 | Status: DISCONTINUED | OUTPATIENT
Start: 2024-02-12 | End: 2024-02-13

## 2024-02-12 RX ORDER — SODIUM CHLORIDE 9 MG/ML
1000 INJECTION, SOLUTION INTRAVENOUS
Refills: 0 | Status: DISCONTINUED | OUTPATIENT
Start: 2024-02-12 | End: 2024-02-12

## 2024-02-12 RX ORDER — SODIUM CHLORIDE 9 MG/ML
1000 INJECTION INTRAMUSCULAR; INTRAVENOUS; SUBCUTANEOUS ONCE
Refills: 0 | Status: DISCONTINUED | OUTPATIENT
Start: 2024-02-12 | End: 2024-02-12

## 2024-02-12 RX ORDER — INSULIN LISPRO 100/ML
VIAL (ML) SUBCUTANEOUS
Refills: 0 | Status: DISCONTINUED | OUTPATIENT
Start: 2024-02-12 | End: 2024-02-12

## 2024-02-12 RX ORDER — LIDOCAINE 4 G/100G
1 CREAM TOPICAL EVERY 24 HOURS
Refills: 0 | Status: DISCONTINUED | OUTPATIENT
Start: 2024-02-12 | End: 2024-02-20

## 2024-02-12 RX ORDER — KETOROLAC TROMETHAMINE 30 MG/ML
15 SYRINGE (ML) INJECTION EVERY 8 HOURS
Refills: 0 | Status: DISCONTINUED | OUTPATIENT
Start: 2024-02-12 | End: 2024-02-12

## 2024-02-12 RX ORDER — INSULIN LISPRO 100/ML
VIAL (ML) SUBCUTANEOUS AT BEDTIME
Refills: 0 | Status: DISCONTINUED | OUTPATIENT
Start: 2024-02-12 | End: 2024-02-20

## 2024-02-12 RX ORDER — INSULIN LISPRO 100/ML
VIAL (ML) SUBCUTANEOUS
Refills: 0 | Status: DISCONTINUED | OUTPATIENT
Start: 2024-02-12 | End: 2024-02-20

## 2024-02-12 RX ADMIN — OXYCODONE HYDROCHLORIDE 10 MILLIGRAM(S): 5 TABLET ORAL at 00:14

## 2024-02-12 RX ADMIN — SODIUM CHLORIDE 1000 MILLILITER(S): 9 INJECTION INTRAMUSCULAR; INTRAVENOUS; SUBCUTANEOUS at 14:14

## 2024-02-12 RX ADMIN — Medication 650 MILLIGRAM(S): at 18:03

## 2024-02-12 RX ADMIN — GABAPENTIN 600 MILLIGRAM(S): 400 CAPSULE ORAL at 06:31

## 2024-02-12 RX ADMIN — OXYCODONE HYDROCHLORIDE 10 MILLIGRAM(S): 5 TABLET ORAL at 01:14

## 2024-02-12 RX ADMIN — GABAPENTIN 600 MILLIGRAM(S): 400 CAPSULE ORAL at 22:17

## 2024-02-12 RX ADMIN — Medication 1 TABLET(S): at 10:56

## 2024-02-12 RX ADMIN — NYSTATIN CREAM 1 APPLICATION(S): 100000 CREAM TOPICAL at 18:03

## 2024-02-12 RX ADMIN — PANTOPRAZOLE SODIUM 40 MILLIGRAM(S): 20 TABLET, DELAYED RELEASE ORAL at 18:04

## 2024-02-12 RX ADMIN — APIXABAN 5 MILLIGRAM(S): 2.5 TABLET, FILM COATED ORAL at 18:03

## 2024-02-12 RX ADMIN — LIDOCAINE 1 PATCH: 4 CREAM TOPICAL at 18:03

## 2024-02-12 RX ADMIN — Medication 50 MILLIGRAM(S): at 06:52

## 2024-02-12 RX ADMIN — SODIUM CHLORIDE 70 MILLILITER(S): 9 INJECTION, SOLUTION INTRAVENOUS at 14:25

## 2024-02-12 RX ADMIN — Medication 650 MILLIGRAM(S): at 06:31

## 2024-02-12 RX ADMIN — Medication 200 MILLIGRAM(S): at 06:31

## 2024-02-12 RX ADMIN — Medication 650 MILLIGRAM(S): at 13:02

## 2024-02-12 RX ADMIN — OXYCODONE HYDROCHLORIDE 10 MILLIGRAM(S): 5 TABLET ORAL at 18:08

## 2024-02-12 RX ADMIN — Medication 2: at 18:02

## 2024-02-12 RX ADMIN — OXYCODONE HYDROCHLORIDE 10 MILLIGRAM(S): 5 TABLET ORAL at 09:26

## 2024-02-12 RX ADMIN — NYSTATIN CREAM 1 APPLICATION(S): 100000 CREAM TOPICAL at 06:52

## 2024-02-12 RX ADMIN — Medication 650 MILLIGRAM(S): at 12:42

## 2024-02-12 RX ADMIN — HYDROMORPHONE HYDROCHLORIDE 0.5 MILLIGRAM(S): 2 INJECTION INTRAMUSCULAR; INTRAVENOUS; SUBCUTANEOUS at 10:46

## 2024-02-12 RX ADMIN — CLOPIDOGREL BISULFATE 75 MILLIGRAM(S): 75 TABLET, FILM COATED ORAL at 09:26

## 2024-02-12 RX ADMIN — APIXABAN 5 MILLIGRAM(S): 2.5 TABLET, FILM COATED ORAL at 06:31

## 2024-02-12 RX ADMIN — OXYCODONE HYDROCHLORIDE 10 MILLIGRAM(S): 5 TABLET ORAL at 19:03

## 2024-02-12 RX ADMIN — GABAPENTIN 600 MILLIGRAM(S): 400 CAPSULE ORAL at 14:13

## 2024-02-12 RX ADMIN — ATORVASTATIN CALCIUM 80 MILLIGRAM(S): 80 TABLET, FILM COATED ORAL at 22:17

## 2024-02-12 RX ADMIN — Medication 650 MILLIGRAM(S): at 19:02

## 2024-02-12 RX ADMIN — HYDROMORPHONE HYDROCHLORIDE 0.5 MILLIGRAM(S): 2 INJECTION INTRAMUSCULAR; INTRAVENOUS; SUBCUTANEOUS at 05:00

## 2024-02-12 RX ADMIN — HYDROMORPHONE HYDROCHLORIDE 0.5 MILLIGRAM(S): 2 INJECTION INTRAMUSCULAR; INTRAVENOUS; SUBCUTANEOUS at 04:22

## 2024-02-12 RX ADMIN — Medication 20 UNIT(S): at 14:02

## 2024-02-12 NOTE — PROGRESS NOTE ADULT - PROBLEM SELECTOR PLAN 1
Pressure like pain mostly in lower quadrants associated with nausea, NBNB vomiting (1 episode), and increased ostomy output. 1 small episode of dark and bright red blood in output. Tachy but afebrile nad generalized tenderness worse in lower quadrants, santoro's sign negative, no guarding or rigidity. Concentrated CBC. Lower suspicion for infection. Lipase negative, EKG without ischemic changes. CT unremarkable. Although lactate was elevated, it cleared with fluids and patient is comfortable appearing on exam, lower suspicion for acute mesenteric ischemia. Could have chronic mesenteric ischemia given CAD and worsened pain over last few days while being volume down. Also considering gastroparesis as the inciting factor for these events given DM and neuropathy, but this would not explain blood and strange she would have increased output with reduced motility. Will repeat ekg and start reglan if qtc is shorter which will help nausea and encourage solid food intake. Will treat pain symptomatically, at home on oxycodone 5mg q6 prn for her back pain, will continue here with istop.  - repeat ekg-->start zofran  - tylenol 650mg Q6h  - oxycodone 5mg QID prn for moderate pain  - oxycodone 10mg QID for severe pain  - plan to de-escalate opioid pain meds as likely contributing or worsening GI sx  - f/u GYN recs

## 2024-02-12 NOTE — PROGRESS NOTE ADULT - ASSESSMENT
43y Female with type 2 insulin-requiring DM, markedly insulin-resistant on U-500 insulin, admitted with hypoglycemia secondary to N/V for the past 24 hours.  Continue to have episodes of hypoglycemia per CGM on much less insulin than she takes at home, likely form long half life of U-500.    A1C: 16.4 %  BUN: 9  Creatinine: 0.73  GFR: 105  Weight: 101.2  BMI: 34.9 43y Female with type 2 insulin-requiring DM, markedly insulin-resistant on U-500 insulin, admitted with hypoglycemia secondary to N/V for the past 24 hours.  Continues to have episodes of hypoglycemia per CGM on much less insulin than she takes at home, likely form long half life of U-500.    A1C: 16.4 %  BUN: 9  Creatinine: 0.73  GFR: 105  Weight: 101.2  BMI: 34.9

## 2024-02-12 NOTE — PROGRESS NOTE ADULT - PROBLEM SELECTOR PLAN 7
Last here in January with change in regimen to humulin 70U TID, admelog 40U TID before meals, metformin 1g qd  - holding for now i/s/o hypoglycemia, will discuss with endocrine  - started D5W due to low fsg despite eating lunch and missing AM lispro

## 2024-02-12 NOTE — PROGRESS NOTE ADULT - SUBJECTIVE AND OBJECTIVE BOX
O/N Events: FAUSTINO    Subjective/ROS: Patient seen and examined at bedside. Complaining of lower abdominal pain and discomfort radiating to back, feels as if she is "giving birth." Reports improved output from ostomy tube. Feels that pain is uncontrolled with current regimen.    Denies Fever/Chills, HA, CP, SOB, n/v, changes in bowel/urinary habits.  12pt ROS otherwise negative.    VITALS  Vital Signs Last 24 Hrs  T(C): 36.8 (13 Feb 2024 08:40), Max: 37.2 (13 Feb 2024 06:00)  T(F): 98.3 (13 Feb 2024 08:40), Max: 98.9 (13 Feb 2024 06:00)  HR: 100 (13 Feb 2024 08:40) (86 - 100)  BP: 154/88 (13 Feb 2024 08:40) (120/75 - 154/88)  BP(mean): --  RR: 19 (13 Feb 2024 08:40) (17 - 19)  SpO2: 95% (13 Feb 2024 08:40) (95% - 97%)    Parameters below as of 13 Feb 2024 08:40  Patient On (Oxygen Delivery Method): room air        CAPILLARY BLOOD GLUCOSE      POCT Blood Glucose.: 81 mg/dL (13 Feb 2024 11:17)  POCT Blood Glucose.: 203 mg/dL (13 Feb 2024 06:34)  POCT Blood Glucose.: 106 mg/dL (12 Feb 2024 21:10)  POCT Blood Glucose.: 167 mg/dL (12 Feb 2024 17:44)  POCT Blood Glucose.: 131 mg/dL (12 Feb 2024 15:39)  POCT Blood Glucose.: 107 mg/dL (12 Feb 2024 13:59)      PHYSICAL EXAM  General: NAD  Head: NC/AT; MMM; PERRL; EOMI;  Neck: Supple; no JVD  Respiratory: CTAB; no wheezes/rales/rhonchi  Cardiovascular: Regular rhythm/rate; S1/S2+, no murmurs, rubs gallops   Gastrointestinal: abd tender   Extremities: WWP; no edema/cyanosis  : rash in inner thigh, erythema in inguinal area  Neurological: A&Ox3    MEDICATIONS  (STANDING):  acetaminophen     Tablet .. 650 milliGRAM(s) Oral every 6 hours  apixaban 5 milliGRAM(s) Oral every 12 hours  atorvastatin 80 milliGRAM(s) Oral at bedtime  clopidogrel Tablet 75 milliGRAM(s) Oral daily  dextrose 5%. 1000 milliLiter(s) (50 mL/Hr) IV Continuous <Continuous>  dextrose 50% Injectable 25 Gram(s) IV Push once  gabapentin 600 milliGRAM(s) Oral every 8 hours  glucagon  Injectable 1 milliGRAM(s) IntraMuscular once  influenza   Vaccine 0.5 milliLiter(s) IntraMuscular once  insulin lispro (ADMELOG) corrective regimen sliding scale   SubCutaneous three times a day before meals  insulin lispro (ADMELOG) corrective regimen sliding scale   SubCutaneous at bedtime  lactobacillus acidophilus 1 Tablet(s) Oral daily  lidocaine   4% Patch 1 Patch Transdermal every 24 hours  metoprolol succinate ER 50 milliGRAM(s) Oral daily  nystatin Powder 1 Application(s) Topical two times a day  pantoprazole    Tablet 40 milliGRAM(s) Oral two times a day    MEDICATIONS  (PRN):  dextrose Oral Gel 15 Gram(s) Oral once PRN Blood Glucose LESS THAN 70 milliGRAM(s)/deciliter  loperamide 2 milliGRAM(s) Oral every 6 hours PRN Diarrhea  oxyCODONE    IR 2.5 milliGRAM(s) Oral every 6 hours PRN Moderate Pain (4 - 6)  oxyCODONE    IR 5 milliGRAM(s) Oral every 6 hours PRN Severe Pain (7 - 10)      penicillin (Hives)  vancomycin (Anaphylaxis; Hives)  shellfish. (Hives; Anaphylaxis)  clindamycin (Pruritus)  Bactrim I.V. (Rash (Mod to Severe))  fish (Hives; Urticaria)  iodine containing compounds (Hives; Anaphylaxis)  amoxicillin (Short breath; Rash)      LABS                        11.1   13.02 )-----------( 464      ( 13 Feb 2024 05:30 )             37.7     02-13    133<L>  |  98  |  14  ----------------------------<  109<H>  4.0   |  24  |  0.74    Ca    9.4      13 Feb 2024 05:30  Phos  3.0     02-13  Mg     1.6     02-13        Urinalysis Basic - ( 13 Feb 2024 05:30 )    Color: x / Appearance: x / SG: x / pH: x  Gluc: 109 mg/dL / Ketone: x  / Bili: x / Urobili: x   Blood: x / Protein: x / Nitrite: x   Leuk Esterase: x / RBC: x / WBC x   Sq Epi: x / Non Sq Epi: x / Bacteria: x            Culture - Genital (collected 02-12-24 @ 17:54)  Source: Vag vaginal  Preliminary Report (02-13-24 @ 11:52):    Normal genital chrystal isolated to date        IMAGING/EKG/ETC

## 2024-02-12 NOTE — CONSULT NOTE ADULT - ASSESSMENT
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 GYN consulted for 2wks of bulging, burning, swelling of vagina.    [] No acute GYN interventions necessary at this time  [] Highly recommend pt follow-up outpatient to establish GYN care for annual exams  We recommend Swetha Britt MD  [address]  [place], [place] [address] [phone]  [] Physical exam unremarkable albeit limited by patient discomfort  [] We will f/u vaginitis and genital culture and recommend treatment pending results  [] GYN to sign off at this time, feel free to reconsult at any time.    [place] PGY4  Discussed w/ Dr. Tran ()

## 2024-02-12 NOTE — PROGRESS NOTE ADULT - PROBLEM SELECTOR PLAN 4
In setting of diarrhea and low solid PO intake. Will continue to replete to above with BID BMP. Plan for diarrhea as above

## 2024-02-12 NOTE — PROGRESS NOTE ADULT - PROBLEM SELECTOR PLAN 5
Suspect that this is in setting of eating less solids and continuing with her home insulin regimen. Holding insulin for now and will reach out to endocrine with help in her regimen.  - c/w D51/2 NS 90cc/hr for now with q6 f/s

## 2024-02-12 NOTE — PROGRESS NOTE ADULT - ASSESSMENT
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 Statement Selected

## 2024-02-12 NOTE — CHART NOTE - NSCHARTNOTEFT_GEN_A_CORE
PDI	My Rx	Current Rx	Drug Type	Rx Written	Rx Dispensed	Drug	Quantity	Days Supply	Prescriber Name	Prescriber ALEE #  A	N	N	O	01/05/2024	01/05/2024	oxycodone hcl (ir) 5 mg tablet	12	4	Ki Valdez	[id number]  Payment Method Medicaid Dispenser [facility] A	N	N	O	06/01/2023	06/01/2023	oxycodone hcl (ir) 5 mg tablet	20	5	Salvador Healy	[id number]  Payment Method Medicaid Dispenser [facility]	N	N	O	03/24/2023	03/25/2023	oxycodone hcl (ir) 10 mg tab	28	7	Pérez Khan	[id number]
PDI	My Rx	Current Rx	Drug Type	Rx Written	Rx Dispensed	Drug	Quantity	Days Supply	Prescriber Name	Prescriber ALEE #	Payment Method	Dispenser  A	N	N	O	01/05/2024	01/05/2024	oxycodone hcl (ir) 5 mg tablet	12	4	Ki Valdez	[id number]	Medicaid	[facility]	N	N	O	06/01/2023	06/01/2023	oxycodone hcl (ir) 5 mg tablet	20	5	Salvador Healy	[id number]	Medicaid	[facility]	N	N	O	03/24/2023	03/25/2023	oxycodone hcl (ir) 10 mg tab	28	7	Pérez Khan	[id number]	[facility]

## 2024-02-12 NOTE — PROGRESS NOTE ADULT - SUBJECTIVE AND OBJECTIVE BOX
SUBJECTIVE / INTERVAL HPI: Patient was seen and examined this morning.     CAPILLARY BLOOD GLUCOSE & INSULIN RECEIVED  154 mg/dL (02-11 @ 04:07)  101 mg/dL (02-11 @ 05:00)  223 mg/dL (02-11 @ 05:30)  72 mg/dL (02-11 @ 06:07)  76 mg/dL (02-11 @ 07:39)  145 mg/dL (02-11 @ 12:40)  273 mg/dL (02-11 @ 16:57)  287 mg/dL (02-11 @ 17:53)  105 mg/dL (02-11 @ 21:55)  109 mg/dL (02-11 @ 23:22)  71 mg/dL (02-12 @ 06:24)  99 mg/dL (02-12 @ 07:32)  110 mg/dL (02-12 @ 09:32)  107 mg/dL (02-12 @ 13:59)      REVIEW OF SYSTEMS  Constitutional:  Negative fever, chills or loss of appetite.  Eyes:  Negative blurry vision or double vision.  Cardiovascular:  Negative for chest pain or palpitations.  Respiratory:  Negative for cough, wheezing, or shortness of breath.    Gastrointestinal:  Negative for nausea, vomiting, diarrhea, constipation, or abdominal pain.  Genitourinary:  Negative frequency, urgency or dysuria.  Neurologic:  No headache, confusion, dizziness, lightheadedness.    PHYSICAL EXAM  Vital Signs Last 24 Hrs  T(C): 36.7 (12 Feb 2024 14:19), Max: 37.1 (11 Feb 2024 20:27)  T(F): 98 (12 Feb 2024 14:19), Max: 98.7 (11 Feb 2024 20:27)  HR: 86 (12 Feb 2024 14:19) (86 - 95)  BP: 132/86 (12 Feb 2024 14:19) (123/75 - 167/81)  BP(mean): 110 (12 Feb 2024 05:15) (110 - 110)  RR: 17 (12 Feb 2024 14:19) (16 - 18)  SpO2: 97% (12 Feb 2024 14:19) (96% - 98%)    Parameters below as of 12 Feb 2024 14:19  Patient On (Oxygen Delivery Method): room air        Constitutional: Awake, alert, in no acute distress.   HEENT: Normocephalic, atraumatic, NAOMI.  Respiratory: Lungs clear to ausculation bilaterally.   Cardiovascular: regular rhythm, normal S1 and S2, no audible murmurs.   GI: soft, non-tender, non-distended, bowel sounds present.  Extremities: No lower extremity edema.  Psychiatric: AAO x 3. Normal affect/mood.     LABS  CBC - WBC/HGB/HTC/PLT: 14.06/11.7/39.3/518 (02-11-24)  BMP - Na/K/Cl/Bicarb/BUN/Cr/Gluc/AG/eGFR: 134/4.4/108/20/9/0.73/273/6/105 (02-11-24)  Ca - 7.6 (02-11-24)  Phos - -- (02-11-24)  Mg - -- (02-11-24)  LFT - Alb/Tprot/Tbili/Dbili/AlkPhos/ALT/AST: 3.0/--/<0.2/0.2/97/6/13 (02-11-24)  PT/aPTT/INR: 10.8/28.3/0.95 (02-11-24)   Thyroid Stimulating Hormone, Serum: 0.797 (12-31-23)      MEDICATIONS  MEDICATIONS  (STANDING):  acetaminophen     Tablet .. 650 milliGRAM(s) Oral every 6 hours  apixaban 5 milliGRAM(s) Oral every 12 hours  atorvastatin 80 milliGRAM(s) Oral at bedtime  clopidogrel Tablet 75 milliGRAM(s) Oral daily  dextrose 5% + lactated ringers. 1000 milliLiter(s) (70 mL/Hr) IV Continuous <Continuous>  dextrose 5%. 1000 milliLiter(s) (50 mL/Hr) IV Continuous <Continuous>  dextrose 50% Injectable 25 Gram(s) IV Push once  gabapentin 600 milliGRAM(s) Oral every 8 hours  glucagon  Injectable 1 milliGRAM(s) IntraMuscular once  influenza   Vaccine 0.5 milliLiter(s) IntraMuscular once  insulin glargine Injectable (LANTUS) 25 Unit(s) SubCutaneous at bedtime  insulin lispro (ADMELOG) corrective regimen sliding scale   SubCutaneous at bedtime  insulin lispro (ADMELOG) corrective regimen sliding scale   SubCutaneous three times a day before meals  insulin lispro Injectable (ADMELOG) 20 Unit(s) SubCutaneous three times a day before meals  lactobacillus acidophilus 1 Tablet(s) Oral daily  lidocaine   4% Patch 1 Patch Transdermal every 24 hours  metoprolol succinate ER 50 milliGRAM(s) Oral daily  nystatin Powder 1 Application(s) Topical two times a day  pantoprazole    Tablet 40 milliGRAM(s) Oral two times a day    MEDICATIONS  (PRN):  dextrose Oral Gel 15 Gram(s) Oral once PRN Blood Glucose LESS THAN 70 milliGRAM(s)/deciliter  loperamide 2 milliGRAM(s) Oral every 6 hours PRN Diarrhea  oxyCODONE    IR 5 milliGRAM(s) Oral every 6 hours PRN Moderate Pain (4 - 6)  oxyCODONE    IR 10 milliGRAM(s) Oral every 6 hours PRN Severe Pain (7 - 10)     SUBJECTIVE / INTERVAL HPI: Patient was seen and examined this morning. She is complaining of perineal pain and concern for infection. She is experiencing hypoglycemia on CGM on much less insulin than she takes at home. She stopped metformin a few days before admission out of concern for increasing stool output via ostomy. She has been tolerating Ozempic 0.5mg weekly.    CAPILLARY BLOOD GLUCOSE & INSULIN RECEIVED  154 mg/dL (02-11 @ 04:07)  101 mg/dL (02-11 @ 05:00)  223 mg/dL (02-11 @ 05:30)  72 mg/dL (02-11 @ 06:07)  76 mg/dL (02-11 @ 07:39)  145 mg/dL (02-11 @ 12:40)  273 mg/dL (02-11 @ 16:57)  287 mg/dL (02-11 @ 17:53) - Lispro 15  105 mg/dL (02-11 @ 21:55)  109 mg/dL (02-11 @ 23:22) - Lantus 25.  SG 60 on dexcom around 5AM. Ate 2 regular yogurts.  71 mg/dL (02-12 @ 06:24)  99 mg/dL (02-12 @ 07:32)  SG 40s on dexcom between breakfast and lunch. Drank juice.  110 mg/dL (02-12 @ 09:32) - Ate juevos rancheros and milk.  107 mg/dL (02-12 @ 13:59)      REVIEW OF SYSTEMS  Constitutional:  Negative fever, chills or loss of appetite.  Eyes:  Negative blurry vision or double vision.  Cardiovascular:  Negative for chest pain or palpitations.  Respiratory:  Negative for cough, wheezing, or shortness of breath.    Gastrointestinal:  Negative for nausea, vomiting, diarrhea, constipation, or abdominal pain.  Genitourinary:  Negative frequency, urgency or dysuria.  Neurologic:  No headache, confusion, dizziness, lightheadedness.    PHYSICAL EXAM  Vital Signs Last 24 Hrs  T(C): 36.7 (12 Feb 2024 14:19), Max: 37.1 (11 Feb 2024 20:27)  T(F): 98 (12 Feb 2024 14:19), Max: 98.7 (11 Feb 2024 20:27)  HR: 86 (12 Feb 2024 14:19) (86 - 95)  BP: 132/86 (12 Feb 2024 14:19) (123/75 - 167/81)  BP(mean): 110 (12 Feb 2024 05:15) (110 - 110)  RR: 17 (12 Feb 2024 14:19) (16 - 18)  SpO2: 97% (12 Feb 2024 14:19) (96% - 98%)    Parameters below as of 12 Feb 2024 14:19  Patient On (Oxygen Delivery Method): room air        Constitutional: Awake, alert, in no acute distress. Obese  HEENT: Normocephalic, atraumatic, ANOMI.  Respiratory: Lungs clear to ausculation bilaterally.   Cardiovascular: regular rhythm, normal S1 and S2, no audible murmurs.   GI: soft, non-tender, non-distended, bowel sounds present. + ostomy  Extremities: No lower extremity edema.  Psychiatric: AAO x 3. Normal affect/mood.     LABS  CBC - WBC/HGB/HTC/PLT: 14.06/11.7/39.3/518 (02-11-24)  BMP - Na/K/Cl/Bicarb/BUN/Cr/Gluc/AG/eGFR: 134/4.4/108/20/9/0.73/273/6/105 (02-11-24)  Ca - 7.6 (02-11-24)  Phos - -- (02-11-24)  Mg - -- (02-11-24)  LFT - Alb/Tprot/Tbili/Dbili/AlkPhos/ALT/AST: 3.0/--/<0.2/0.2/97/6/13 (02-11-24)  PT/aPTT/INR: 10.8/28.3/0.95 (02-11-24)   Thyroid Stimulating Hormone, Serum: 0.797 (12-31-23)      MEDICATIONS  MEDICATIONS  (STANDING):  acetaminophen     Tablet .. 650 milliGRAM(s) Oral every 6 hours  apixaban 5 milliGRAM(s) Oral every 12 hours  atorvastatin 80 milliGRAM(s) Oral at bedtime  clopidogrel Tablet 75 milliGRAM(s) Oral daily  dextrose 5% + lactated ringers. 1000 milliLiter(s) (70 mL/Hr) IV Continuous <Continuous>  dextrose 5%. 1000 milliLiter(s) (50 mL/Hr) IV Continuous <Continuous>  dextrose 50% Injectable 25 Gram(s) IV Push once  gabapentin 600 milliGRAM(s) Oral every 8 hours  glucagon  Injectable 1 milliGRAM(s) IntraMuscular once  influenza   Vaccine 0.5 milliLiter(s) IntraMuscular once  insulin glargine Injectable (LANTUS) 25 Unit(s) SubCutaneous at bedtime  insulin lispro (ADMELOG) corrective regimen sliding scale   SubCutaneous at bedtime  insulin lispro (ADMELOG) corrective regimen sliding scale   SubCutaneous three times a day before meals  insulin lispro Injectable (ADMELOG) 20 Unit(s) SubCutaneous three times a day before meals  lactobacillus acidophilus 1 Tablet(s) Oral daily  lidocaine   4% Patch 1 Patch Transdermal every 24 hours  metoprolol succinate ER 50 milliGRAM(s) Oral daily  nystatin Powder 1 Application(s) Topical two times a day  pantoprazole    Tablet 40 milliGRAM(s) Oral two times a day    MEDICATIONS  (PRN):  dextrose Oral Gel 15 Gram(s) Oral once PRN Blood Glucose LESS THAN 70 milliGRAM(s)/deciliter  loperamide 2 milliGRAM(s) Oral every 6 hours PRN Diarrhea  oxyCODONE    IR 5 milliGRAM(s) Oral every 6 hours PRN Moderate Pain (4 - 6)  oxyCODONE    IR 10 milliGRAM(s) Oral every 6 hours PRN Severe Pain (7 - 10)

## 2024-02-12 NOTE — PROGRESS NOTE ADULT - PROBLEM SELECTOR PLAN 1
Type 2 diabetes mellitus   - Start Dextrose IVF @ 70 ml/hr. Continue overnight. Stop if FSG >200mg/dl.   - STOP Lantus  - Stop standing nutritional lispro before each meal.  - Continue lispro moderate dose sliding scale before meals and at bedtime.  - Patient's fingerstick glucose goal is 100-180 mg/dL.    - For discharge: U500 units every 8 hours, admelog units before meals, continue ozempic, stop metformin.  - Patient can follow up at discharge with [facility] by calling [phone] to make an appointment.      Case discussed with Dr. Bowden. Primary team updated.

## 2024-02-12 NOTE — CONSULT NOTE ADULT - SUBJECTIVE AND OBJECTIVE BOX
INCOMPLETE NOTE  43y  LMP 5/10 HTN, HLD, b/l PE on Eliquis (23), Abdominal Necrotizing fascitis (s/p suprapubic catheter and ostomy bag placement on 21 at [facility]), and PCOS found to have 3 cm simple right ovarian cyst on imaging today presenting with worsening lower abdominal pain. Patient has had significant abdominal pain since the start of the necrotizing fasciitis infection, but the pain has worsened over the last week. It is "11/10" pain daily over the whole lower abdomen. Worse with movement but also worse if she sits still for too long. Nothing improves the pain significantly, including Motrin. She reports she is no longer sexually active as the skin was removed from her right labia majora and the introitus to the vagina is strictured and painful, so that she cannot put a tampon "or even a finger" in her vagina. Reports mild nausea with the pain; denies vomiting. Denies fever, chills, HA, chest pain, palpitations, SOB, abdominal pain, vaginal bleeding, dysuria, or abnormal vaginal discharge.    OB H/x: SAB x3, C/S x3 (, , - reports all 3 were early 2/2 PTL)  GYN H/x: PCOS. MARIAM III s/p LEEP in  and , normal paps since. Denies hx of fibroids, STIs    MED H/x: HTN, HLD, b/l PE on Eliquis (23), Abdominal Necrotizing fascitis, asthma, migraines  SURG H/x: multiple surgeries to removed infected skin and tissue from necrotizing fasciitis, including ostomy and suprapubic catheter placement x2, inguinal hernia repair x2, C/S x3    Home Medications:  apixaban 5 mg oral tablet: 1 tab(s) orally every 12 hours (23 May 2023 08:45)  HumuLIN R (Concentrated) 500 units/mL subcutaneous solution: 100 unit(s) subcutaneous 3 times a day (23 May 2023 08:45)  labetalol 100 mg oral tablet: 1 dose(s) orally once a day (23 May 2023 08:45)  losartan 50 mg oral tablet: 1 tab(s) orally once a day (23 May 2023 08:45)  ventalin  capsaicin cream     Allergies:   shellfish. (Hives; Anaphylaxis)  penicillin (Hives)  clindamycin (Pruritus)  amoxicillin (Unknown)  iodine containing compounds (Hives; Anaphylaxis)  fish (Hives; Urticaria)     43y  LMP 5/10 HTN, HLD, CVA (residual L>R weakness), b/l PE on Eliquis (23), nec fasc of abdomen s/o corbin procedure w ostomy 21 s/p suprapubic catheter at [facility]), multiple abdominal hernia repairs, and PCOS. Pt came in for 4-5 days of nausea and increased yellowish watery output from her ostomy. GYN consulted bc patient complaining of bulging, swelling, and burning in her vagina x 2wks.    Pt describes that her labia feel like they are swollen, and they burn when she pees. She hasn't noticed thick discharge or any new odor. No rashes/ulcers. Pt is intermittently sexually active with her  and has not had any hx of STIs. After the nec fasc procedure, her skin was removed from her right labia majora and the introitus to the vagina is strictured and painful, so that she cannot put a tampon "or even a finger" in her vagina comfortably. Denies fever, chills, HA, chest pain, palpitations, SOB, abdominal pain, vaginal bleeding, dysuria, or abnormal vaginal discharge.    OB H/x: SAB x3, C/S x3 (, , - reports all 3 were early 2/2 PTL)  GYN H/x: PCOS. MARIAM III s/p LEEP in  and , normal paps since. Denies hx of fibroids, STIs  MED H/x: HTN, HLD, b/l PE on Eliquis (23), Abdominal Necrotizing fascitis, asthma, migraines, CVA (residual L>R weakness),  SURG H/x: multiple surgeries to removed infected skin and tissue from necrotizing fasciitis, including ostomy and suprapubic catheter placement x2, inguinal hernia repair x2, C/S x3    Home Medications:  apixaban 5 mg oral tablet: 1 tab(s) orally every 12 hours (23 May 2023 08:45)  HumuLIN R (Concentrated) 500 units/mL subcutaneous solution: 100 unit(s) subcutaneous 3 times a day (23 May 2023 08:45)  labetalol 100 mg oral tablet: 1 dose(s) orally once a day (23 May 2023 08:45)  losartan 50 mg oral tablet: 1 tab(s) orally once a day (23 May 2023 08:45)  Ventalin  capsaicin cream     Allergies:   shellfish. (Hives; Anaphylaxis)  penicillin (Hives)  clindamycin (Pruritus)  amoxicillin (Unknown)  iodine containing compounds (Hives; Anaphylaxis)  fish (Hives; Urticaria)    Vital Signs Last 24 Hrs  T(C): 36.7 (2024 16:35), Max: 37.1 (2024 20:27)  T(F): 98.1 (2024 16:35), Max: 98.7 (2024 20:27)  HR: 98 (2024 16:35) (86 - 98)  BP: 120/75 (2024 16:35) (120/75 - 167/81)  BP(mean): 110 (2024 05:15) (110 - 110)  RR: 17 (2024 16:35) (16 - 18)  SpO2: 97% (2024 16:35) (96% - 98%)    Parameters below as of 2024 16:35  Patient On (Oxygen Delivery Method): room air    Physical Exam:  Gen: NAD, comfortable  GI: soft, nontender, nondistended, no rebound no guarding, ostomy in place  BME: normal anteverted uterus, loss of skin/tissue to the right labia, strictured vaginal introtus, no adnexal masses appreciated, unable to tolerate full exam so unable to assess cervical motion tenderness  Spec: unable to tolerate full exam, cervix no visualized, but otherwise normal vaginal wall/tissue with small amount of vaginal discharge, no odor, no ulcers/rashes      LABS:                        11.7   14.06 )-----------( 518      ( 2024 00:15 )             39.3     02-11    134<L>  |  108  |  9   ----------------------------<  273<H>  4.4   |  20<L>  |  0.73    Ca    7.6<L>      2024 16:57  Mg     1.6     02-11    TPro  5.9<L>  /  Alb  3.0<L>  /  TBili  <0.2  /  DBili  0.2  /  AST  13  /  ALT  6<L>  /  AlkPhos  97  02-11    PT/INR - ( 2024 00:15 )   PT: 10.8 sec;   INR: 0.95          PTT - ( 2024 00:15 )  PTT:28.3 sec  Urinalysis Basic - ( 2024 04:36 )    Color: Yellow / Appearance: Clear / S.010 / pH: x  Gluc: x / Ketone: Negative mg/dL  / Bili: Negative / Urobili: 0.2 mg/dL   Blood: x / Protein: Negative mg/dL / Nitrite: Negative   Leuk Esterase: Trace / RBC: 4 /HPF / WBC 6 /HPF   Sq Epi: x / Non Sq Epi: 1 /HPF / Bacteria: Negative /HPF        RADIOLOGY & ADDITIONAL STUDIES:

## 2024-02-13 LAB
ANION GAP SERPL CALC-SCNC: 11 MMOL/L — SIGNIFICANT CHANGE UP (ref 5–17)
BASOPHILS # BLD AUTO: 0.03 K/UL — SIGNIFICANT CHANGE UP (ref 0–0.2)
BASOPHILS NFR BLD AUTO: 0.2 % — SIGNIFICANT CHANGE UP (ref 0–2)
BUN SERPL-MCNC: 14 MG/DL — SIGNIFICANT CHANGE UP (ref 7–23)
CALCIUM SERPL-MCNC: 9.4 MG/DL — SIGNIFICANT CHANGE UP (ref 8.4–10.5)
CHLORIDE SERPL-SCNC: 98 MMOL/L — SIGNIFICANT CHANGE UP (ref 96–108)
CO2 SERPL-SCNC: 24 MMOL/L — SIGNIFICANT CHANGE UP (ref 22–31)
CREAT SERPL-MCNC: 0.74 MG/DL — SIGNIFICANT CHANGE UP (ref 0.5–1.3)
EGFR: 103 ML/MIN/1.73M2 — SIGNIFICANT CHANGE UP
EOSINOPHIL # BLD AUTO: 0.88 K/UL — HIGH (ref 0–0.5)
EOSINOPHIL NFR BLD AUTO: 6.8 % — HIGH (ref 0–6)
GLUCOSE BLDC GLUCOMTR-MCNC: 128 MG/DL — HIGH (ref 70–99)
GLUCOSE BLDC GLUCOMTR-MCNC: 181 MG/DL — HIGH (ref 70–99)
GLUCOSE BLDC GLUCOMTR-MCNC: 192 MG/DL — HIGH (ref 70–99)
GLUCOSE BLDC GLUCOMTR-MCNC: 203 MG/DL — HIGH (ref 70–99)
GLUCOSE BLDC GLUCOMTR-MCNC: 81 MG/DL — SIGNIFICANT CHANGE UP (ref 70–99)
GLUCOSE SERPL-MCNC: 109 MG/DL — HIGH (ref 70–99)
HCT VFR BLD CALC: 37.7 % — SIGNIFICANT CHANGE UP (ref 34.5–45)
HGB BLD-MCNC: 11.1 G/DL — LOW (ref 11.5–15.5)
IMM GRANULOCYTES NFR BLD AUTO: 0.5 % — SIGNIFICANT CHANGE UP (ref 0–0.9)
LYMPHOCYTES # BLD AUTO: 18.9 % — SIGNIFICANT CHANGE UP (ref 13–44)
LYMPHOCYTES # BLD AUTO: 2.46 K/UL — SIGNIFICANT CHANGE UP (ref 1–3.3)
MAGNESIUM SERPL-MCNC: 1.6 MG/DL — SIGNIFICANT CHANGE UP (ref 1.6–2.6)
MCHC RBC-ENTMCNC: 22.2 PG — LOW (ref 27–34)
MCHC RBC-ENTMCNC: 29.4 GM/DL — LOW (ref 32–36)
MCV RBC AUTO: 75.2 FL — LOW (ref 80–100)
MONOCYTES # BLD AUTO: 0.85 K/UL — SIGNIFICANT CHANGE UP (ref 0–0.9)
MONOCYTES NFR BLD AUTO: 6.5 % — SIGNIFICANT CHANGE UP (ref 2–14)
NEUTROPHILS # BLD AUTO: 8.73 K/UL — HIGH (ref 1.8–7.4)
NEUTROPHILS NFR BLD AUTO: 67.1 % — SIGNIFICANT CHANGE UP (ref 43–77)
NRBC # BLD: 0 /100 WBCS — SIGNIFICANT CHANGE UP (ref 0–0)
PHOSPHATE SERPL-MCNC: 3 MG/DL — SIGNIFICANT CHANGE UP (ref 2.5–4.5)
PLATELET # BLD AUTO: 464 K/UL — HIGH (ref 150–400)
POTASSIUM SERPL-MCNC: 4 MMOL/L — SIGNIFICANT CHANGE UP (ref 3.5–5.3)
POTASSIUM SERPL-SCNC: 4 MMOL/L — SIGNIFICANT CHANGE UP (ref 3.5–5.3)
RBC # BLD: 5.01 M/UL — SIGNIFICANT CHANGE UP (ref 3.8–5.2)
RBC # FLD: 16.1 % — HIGH (ref 10.3–14.5)
SODIUM SERPL-SCNC: 133 MMOL/L — LOW (ref 135–145)
WBC # BLD: 13.02 K/UL — HIGH (ref 3.8–10.5)
WBC # FLD AUTO: 13.02 K/UL — HIGH (ref 3.8–10.5)

## 2024-02-13 PROCEDURE — 99231 SBSQ HOSP IP/OBS SF/LOW 25: CPT

## 2024-02-13 PROCEDURE — 74018 RADEX ABDOMEN 1 VIEW: CPT | Mod: 26

## 2024-02-13 PROCEDURE — 99233 SBSQ HOSP IP/OBS HIGH 50: CPT | Mod: GC

## 2024-02-13 PROCEDURE — 93010 ELECTROCARDIOGRAM REPORT: CPT

## 2024-02-13 PROCEDURE — 99254 IP/OBS CNSLTJ NEW/EST MOD 60: CPT | Mod: GC

## 2024-02-13 RX ORDER — OXYCODONE HYDROCHLORIDE 5 MG/1
2.5 TABLET ORAL EVERY 6 HOURS
Refills: 0 | Status: DISCONTINUED | OUTPATIENT
Start: 2024-02-13 | End: 2024-02-14

## 2024-02-13 RX ORDER — METOCLOPRAMIDE HCL 10 MG
5 TABLET ORAL EVERY 8 HOURS
Refills: 0 | Status: DISCONTINUED | OUTPATIENT
Start: 2024-02-13 | End: 2024-02-14

## 2024-02-13 RX ORDER — OXYCODONE HYDROCHLORIDE 5 MG/1
5 TABLET ORAL EVERY 6 HOURS
Refills: 0 | Status: DISCONTINUED | OUTPATIENT
Start: 2024-02-13 | End: 2024-02-14

## 2024-02-13 RX ORDER — ONDANSETRON 8 MG/1
4 TABLET, FILM COATED ORAL ONCE
Refills: 0 | Status: COMPLETED | OUTPATIENT
Start: 2024-02-13 | End: 2024-02-13

## 2024-02-13 RX ADMIN — APIXABAN 5 MILLIGRAM(S): 2.5 TABLET, FILM COATED ORAL at 18:43

## 2024-02-13 RX ADMIN — Medication 650 MILLIGRAM(S): at 07:21

## 2024-02-13 RX ADMIN — APIXABAN 5 MILLIGRAM(S): 2.5 TABLET, FILM COATED ORAL at 07:21

## 2024-02-13 RX ADMIN — GABAPENTIN 600 MILLIGRAM(S): 400 CAPSULE ORAL at 07:21

## 2024-02-13 RX ADMIN — OXYCODONE HYDROCHLORIDE 5 MILLIGRAM(S): 5 TABLET ORAL at 22:55

## 2024-02-13 RX ADMIN — OXYCODONE HYDROCHLORIDE 10 MILLIGRAM(S): 5 TABLET ORAL at 06:27

## 2024-02-13 RX ADMIN — Medication 2 MILLIGRAM(S): at 06:27

## 2024-02-13 RX ADMIN — Medication 4: at 07:21

## 2024-02-13 RX ADMIN — GABAPENTIN 600 MILLIGRAM(S): 400 CAPSULE ORAL at 22:33

## 2024-02-13 RX ADMIN — Medication 5 MILLIGRAM(S): at 22:34

## 2024-02-13 RX ADMIN — ONDANSETRON 4 MILLIGRAM(S): 8 TABLET, FILM COATED ORAL at 11:30

## 2024-02-13 RX ADMIN — OXYCODONE HYDROCHLORIDE 5 MILLIGRAM(S): 5 TABLET ORAL at 23:55

## 2024-02-13 RX ADMIN — PANTOPRAZOLE SODIUM 40 MILLIGRAM(S): 20 TABLET, DELAYED RELEASE ORAL at 18:43

## 2024-02-13 RX ADMIN — PANTOPRAZOLE SODIUM 40 MILLIGRAM(S): 20 TABLET, DELAYED RELEASE ORAL at 07:21

## 2024-02-13 RX ADMIN — Medication 650 MILLIGRAM(S): at 00:22

## 2024-02-13 RX ADMIN — Medication 200 MILLIGRAM(S): at 06:27

## 2024-02-13 RX ADMIN — LIDOCAINE 1 PATCH: 4 CREAM TOPICAL at 06:00

## 2024-02-13 RX ADMIN — NYSTATIN CREAM 1 APPLICATION(S): 100000 CREAM TOPICAL at 07:22

## 2024-02-13 RX ADMIN — OXYCODONE HYDROCHLORIDE 10 MILLIGRAM(S): 5 TABLET ORAL at 00:21

## 2024-02-13 RX ADMIN — ATORVASTATIN CALCIUM 80 MILLIGRAM(S): 80 TABLET, FILM COATED ORAL at 22:41

## 2024-02-13 RX ADMIN — OXYCODONE HYDROCHLORIDE 10 MILLIGRAM(S): 5 TABLET ORAL at 07:27

## 2024-02-13 RX ADMIN — OXYCODONE HYDROCHLORIDE 10 MILLIGRAM(S): 5 TABLET ORAL at 01:21

## 2024-02-13 NOTE — PROGRESS NOTE ADULT - PROBLEM SELECTOR PLAN 1
Pressure like pain mostly in lower quadrants associated with nausea, NBNB vomiting (1 episode), and increased ostomy output. 1 small episode of dark and bright red blood in output. Lipase negative, EKG without ischemic changes. CT unremarkable. Although lactate was elevated, it cleared with fluids and patient is comfortable appearing on exam, lower suspicion for acute mesenteric ischemia. DDx include gastroparesis (however pt having diarrhea and not reduced motility) vs chronic mesenteric ischemia. GYN consulted to r/o vaginal prolapse as cause of sx, however low suspcion at this time. GI consulted.  - zofran prn for nausea (qtc 436)  - tylenol 650mg Q6h  - oxycodone 2.5mg q6 prn for moderate pain  - oxycodone 5mg q6 prn for severe pain  - plan to de-escalate opioid pain meds as likely contributing or worsening GI sx  - f/u pancreatic elastase and calprotectin stool   - f/u GYN recs

## 2024-02-13 NOTE — PROGRESS NOTE ADULT - ASSESSMENT
44 y/o F PMH CVA (residual L>R weakness), PE (on Eliquis), multiple abdominal hernia repairs, 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

## 2024-02-13 NOTE — CONSULT NOTE ADULT - SUBJECTIVE AND OBJECTIVE BOX
Initial GI Consult Note:     HPI:  43F with PMH CVA (residual L>R weakness), PE (on Eliquis), multiple abdominal hernia repairs, nec fasc of abdomen s/p Brandie's procedure w/ ostomy, uncontrolled DM, CAD with multiple stents (last in 2023) presents with n/v, increased ostomy output, and low blood sugar found to have hypokalemia and hypoglycemia. GI consulted for nausea/vomiting and high ostomy out-put. Patient seen and examined at bedside. States that she knows her HbA1c is very elevated but suspects this is due to her recently having an infection. Continues to have nausea and vomiting. Patient states the she has been eating sugary foods at times when hypoglycemic to bring her blood sugar up. Endocrine consulted and currently working to better control patient's diabetes. Also states that she believes her nausea and vomiting is the result of her diabetes, which has been difficult to control. Having bowel movements and passing flatus. No sick contacts. GI CPR and c diff negative this admission.       Allergies  penicillin (Hives)  vancomycin (Anaphylaxis; Hives)  shellfish. (Hives; Anaphylaxis)  clindamycin (Pruritus)  fish (Hives; Urticaria)  iodine containing compounds (Hives; Anaphylaxis)  amoxicillin (Short breath; Rash)    Intolerances    Bactrim I.V. (Rash (Mod to Severe))    Home Medications:    MEDICATIONS:  MEDICATIONS  (STANDING):  acetaminophen     Tablet .. 650 milliGRAM(s) Oral every 6 hours  apixaban 5 milliGRAM(s) Oral every 12 hours  atorvastatin 80 milliGRAM(s) Oral at bedtime  clopidogrel Tablet 75 milliGRAM(s) Oral daily  dextrose 5%. 1000 milliLiter(s) (50 mL/Hr) IV Continuous <Continuous>  dextrose 50% Injectable 25 Gram(s) IV Push once  gabapentin 600 milliGRAM(s) Oral every 8 hours  glucagon  Injectable 1 milliGRAM(s) IntraMuscular once  influenza   Vaccine 0.5 milliLiter(s) IntraMuscular once  insulin lispro (ADMELOG) corrective regimen sliding scale   SubCutaneous three times a day before meals  insulin lispro (ADMELOG) corrective regimen sliding scale   SubCutaneous at bedtime  lactobacillus acidophilus 1 Tablet(s) Oral daily  lidocaine   4% Patch 1 Patch Transdermal every 24 hours  metoprolol succinate ER 50 milliGRAM(s) Oral daily  nystatin Powder 1 Application(s) Topical two times a day  pantoprazole    Tablet 40 milliGRAM(s) Oral two times a day    MEDICATIONS  (PRN):  dextrose Oral Gel 15 Gram(s) Oral once PRN Blood Glucose LESS THAN 70 milliGRAM(s)/deciliter  loperamide 2 milliGRAM(s) Oral every 6 hours PRN Diarrhea  oxyCODONE    IR 5 milliGRAM(s) Oral every 6 hours PRN Severe Pain (7 - 10)  oxyCODONE    IR 2.5 milliGRAM(s) Oral every 6 hours PRN Moderate Pain (4 - 6)    PAST MEDICAL & SURGICAL HISTORY:  Essential hypertension      Hyperlipidemia      Obesity      Abdominal hernia      Pre-eclampsia      Pulmonary embolism      Type 2 diabetes mellitus      History of necrotizing fasciitis      History of creation of ostomy      Suprapubic catheter      H/O: CVA (cerebrovascular accident)      Sepsis, unspecified organism      H/O LEEP      History of D&C      H/O:       H/O ventral hernia repair      H/O exploratory laparotomy      History of creation of ostomy        FAMILY HISTORY:  FH: diabetes mellitus  father and mother    FH: hypertension  father and mother      SOCIAL HISTORY:  [employer]: [ ] Current, [ ] Former, [ ] Never; Pack Years:  Alcohol:  Illicit Drugs:      Vital Signs Last 24 Hrs  T(C): 36.4 (2024 16:08), Max: 37.2 (2024 06:00)  T(F): 97.6 (2024 16:08), Max: 98.9 (2024 06:00)  HR: 99 (2024 16:08) (99 - 100)  BP: 121/78 (2024 16:08) (121/78 - 154/88)  BP(mean): --  RR: 17 (2024 16:08) (17 - 19)  SpO2: 93% (2024 16:08) (93% - 97%)    Parameters below as of 2024 16:08  Patient On (Oxygen Delivery Method): room air         @ 07:01  -   @ 07:00  --------------------------------------------------------  IN: 1040 mL / OUT: 2500 mL / NET: -1460 mL     @ 07:01  -  02-13 @ 17:24  --------------------------------------------------------  IN: 450 mL / OUT: 1550 mL / NET: -1100 mL      PHYSICAL EXAM:  General: No acute distress, obese   Lungs: Normal respiratory effort and no intercostal retractions  Cardiovascular: RRR  Abdomen: Soft, non-tender, non-distended  Neurological: Alert and oriented x3  Skin: Warm and dry. No obvious rash       LABS:                        11.1   13.02 )-----------( 464      ( 2024 05:30 )             37.7     02-13    133<L>  |  98  |  14  ----------------------------<  109<H>  4.0   |  24  |  0.74    Ca    9.4      2024 05:30  Phos  3.0     02-13  Mg     1.6     -13        Culture Results:   Normal genital chrystal isolated to date ( @ 17:54)        Culture - Genital (collected 2024 17:54)  Source: Vag vaginal  Preliminary Report (2024 11:52):    Normal genital chrystal isolated to date    Urinalysis with Rflx Culture (collected 2024 04:36)    Urinalysis with Rflx Culture (collected 2024 04:48)    Culture - Blood (collected 2024 04:23)  Source: .Blood Blood-Venous  Preliminary Report (2024 05:00):    No growth at 2 days.    Culture - Blood (collected 2024 04:23)  Source: .Blood Blood-Peripheral  Preliminary Report (2024 05:00):    No growth at 2 days.      RADIOLOGY & ADDITIONAL STUDIES:     Reviewed

## 2024-02-13 NOTE — CONSULT NOTE ADULT - ASSESSMENT
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 GI consulted for nausea/vomiting in the setting of a HbA1c of 16 in January 2024.     Suspect symptoms all 2/2 to poorly controlled DM2 and diabetic gastroparesis     Recommendations:   - GI PCR and c diff negative   - can send stool Na, K, and osmolarity to calculate stool osmolar gap  - consult ostomy nurse for high out-put from ostomy   - can trial Reglan to see if helps with nausea and gastroparesis symptoms if QTC <500     Case discussed with Dr. Doan. GI Team will continue to follow.     Nancy Bundy D.O.   Gastroenterology Fellow  Weekday 7am-5pm Pager: [phone]  Weeknights/Weekend/Holiday Coverage: Please call the  for contact info

## 2024-02-13 NOTE — PROGRESS NOTE ADULT - PROBLEM SELECTOR PLAN 3
Initially p/w tachycardia, leukocytosis, and elevated lactate without clear source of infection. Infectious workup negative. Lactate cleared with fluids. CTAP noncon unremarkable, prior CT abd/pelvis in Dec 2023 with IV contrast without fluid collection or necrotizing fasiciits at that time however it showed left adnexal mass likely hemorrhagic cyst and they recommended nonemergent pelvis US.   - Will hold abx for now without clear source of infection.  - Will f/u pancreatic elastase and calprotectin stool

## 2024-02-13 NOTE — PROGRESS NOTE ADULT - SUBJECTIVE AND OBJECTIVE BOX
SUBJECTIVE / INTERVAL HPI: Patient was seen and examined this morning. Pt had episode of vomiting ostomy output this morning. Per RN, 300ml emesis and 250 stomy output around the same time. Abd XR ordered. Tigan given. She ate dinner, but hasn't eaten/drank anything since.    CAPILLARY BLOOD GLUCOSE & INSULIN RECEIVED  71 mg/dL (02-12 @ 06:24)  99 mg/dL (02-12 @ 07:32)  110 mg/dL (02-12 @ 09:32)  107 mg/dL (02-12 @ 13:59) - Lispro 20 + DrLR @ 50ml  131 mg/dL (02-12 @ 15:39)  167 mg/dL (02-12 @ 17:44) - Lispro 2  106 mg/dL (02-12 @ 21:10)  109 mg/dL (02-13 @ 05:30)  203 mg/dL (02-13 @ 06:34) - Lispro 4. Dextrose IVF stopped.  81 mg/dL (02-13 @ 11:17)      REVIEW OF SYSTEMS  Constitutional:  Negative fever, chills or loss of appetite.  Eyes:  Negative blurry vision or double vision.  Cardiovascular:  Negative for chest pain or palpitations.  Respiratory:  Negative for cough, wheezing, or shortness of breath.    Gastrointestinal:  Negative for diarrhea, constipation, or abdominal pain. +  nausea, vomiting  Genitourinary:  Negative frequency, urgency or dysuria.  Neurologic:  No headache, confusion, dizziness, lightheadedness.    PHYSICAL EXAM  Vital Signs Last 24 Hrs  T(C): 36.9 (13 Feb 2024 13:17), Max: 37.2 (13 Feb 2024 06:00)  T(F): 98.4 (13 Feb 2024 13:17), Max: 98.9 (13 Feb 2024 06:00)  HR: 99 (13 Feb 2024 13:17) (86 - 100)  BP: 133/78 (13 Feb 2024 13:17) (120/75 - 154/88)  BP(mean): --  RR: 18 (13 Feb 2024 13:17) (17 - 19)  SpO2: 97% (13 Feb 2024 13:17) (95% - 97%)    Parameters below as of 13 Feb 2024 13:17  Patient On (Oxygen Delivery Method): room air        Constitutional: Awake, alert, in no acute distress. Obese  HEENT: Normocephalic, atraumatic, NAOMI.  Respiratory: Lungs clear to ausculation bilaterally.   Cardiovascular: regular rhythm, normal S1 and S2, no audible murmurs.   GI: soft, non-tender, non-distended, bowel sounds present. + ostomy  Extremities: No lower extremity edema.  Psychiatric: AAO x 3. Normal affect/mood.     LABS  CBC - WBC/HGB/HTC/PLT: 13.02/11.1/37.7/464 (02-13-24)  BMP - Na/K/Cl/Bicarb/BUN/Cr/Gluc/AG/eGFR: 133/4.0/98/24/14/0.74/109/11/103 (02-13-24)  Ca - 9.4 (02-13-24)  Phos - 3.0 (02-13-24)  Mg - 1.6 (02-13-24)  LFT - Alb/Tprot/Tbili/Dbili/AlkPhos/ALT/AST: 3.0/--/<0.2/0.2/97/6/13 (02-11-24)  PT/aPTT/INR: 10.8/28.3/0.95 (02-11-24)   Thyroid Stimulating Hormone, Serum: 0.797 (12-31-23)      MEDICATIONS  MEDICATIONS  (STANDING):  acetaminophen     Tablet .. 650 milliGRAM(s) Oral every 6 hours  apixaban 5 milliGRAM(s) Oral every 12 hours  atorvastatin 80 milliGRAM(s) Oral at bedtime  clopidogrel Tablet 75 milliGRAM(s) Oral daily  dextrose 5%. 1000 milliLiter(s) (50 mL/Hr) IV Continuous <Continuous>  dextrose 50% Injectable 25 Gram(s) IV Push once  gabapentin 600 milliGRAM(s) Oral every 8 hours  glucagon  Injectable 1 milliGRAM(s) IntraMuscular once  influenza   Vaccine 0.5 milliLiter(s) IntraMuscular once  insulin lispro (ADMELOG) corrective regimen sliding scale   SubCutaneous three times a day before meals  insulin lispro (ADMELOG) corrective regimen sliding scale   SubCutaneous at bedtime  lactobacillus acidophilus 1 Tablet(s) Oral daily  lidocaine   4% Patch 1 Patch Transdermal every 24 hours  metoprolol succinate ER 50 milliGRAM(s) Oral daily  nystatin Powder 1 Application(s) Topical two times a day  pantoprazole    Tablet 40 milliGRAM(s) Oral two times a day    MEDICATIONS  (PRN):  dextrose Oral Gel 15 Gram(s) Oral once PRN Blood Glucose LESS THAN 70 milliGRAM(s)/deciliter  loperamide 2 milliGRAM(s) Oral every 6 hours PRN Diarrhea  oxyCODONE    IR 2.5 milliGRAM(s) Oral every 6 hours PRN Moderate Pain (4 - 6)  oxyCODONE    IR 5 milliGRAM(s) Oral every 6 hours PRN Severe Pain (7 - 10)

## 2024-02-13 NOTE — CONSULT NOTE ADULT - ATTENDING COMMENTS
Pt seen and d/w fellows yesterday afternoon, 2/13.  Pt needs tighter glucose control.  Stool PCR and C. diff negative.  Start reglan qid.  Will continue to follow.
43F with extensive surgical history after abdominal nec fasc requiring ultimately ostomy placement and repeat hernia repairs and with uncontrolled T2DM presenting now for increased watery stool output from ostomy, hypoglycemia, and dehydration. Work up infectious etiologies of increased stool output (C diff, GI PCR) but also send for elastase and fecal calprotectin. She is nontoxic appearing, light watery stool from ostomy, otherwise unremarkable examination. Given lower suspicion for infectious etiology, monitor off antibiotics pending stool studies. Hypokalemia secondary to the above. Replete aggressively. D5+1/2NS gtt for hypoglycemia, fingersticks already improving, fingersticks q6h, endocrine consult. Does not require telemetry.

## 2024-02-13 NOTE — PROGRESS NOTE ADULT - ASSESSMENT
43y Female with type 2 insulin-requiring DM, markedly insulin-resistant on U-500 insulin, admitted with hypoglycemia secondary to N/V for the past 24 hours.  Continues to have episodes of hypoglycemia per CGM on much less insulin than she takes at home, likely form long half life of U-500.    A1C: 16.4 %  BUN: 14  Creatinine: 0.74  GFR: 103  Weight: 101.2  BMI: 34.9

## 2024-02-13 NOTE — PROGRESS NOTE ADULT - PROBLEM SELECTOR PLAN 5
Suspect that this is in setting of eating less solids and continuing with her home insulin regimen. Holding insulin for now and will reach out to endocrine with help in her regimen.  - started D5LR @70cc/hr given hypoglycemia, discontinued i/s/o fsg >200

## 2024-02-13 NOTE — PROGRESS NOTE ADULT - PROBLEM SELECTOR PLAN 7
Last here in January with change in regimen to humulin 70U TID, admelog 40U TID before meals, metformin 1g qd  - holding for now i/s/o hypoglycemia, will discuss with endocrine  - q6 fsg Last here in January with change in regimen to humulin 70U TID, admelog 40U TID before meals, metformin 1g qd. A1c last admission 16.4. Per endocrinology, pt was taking metformin leading up to admission.   - holding for now i/s/o hypoglycemia, will discuss with endocrine  - q6 fsg  - discontinue metformin on DC, may be contributing to GI sx

## 2024-02-13 NOTE — PROGRESS NOTE ADULT - PROBLEM SELECTOR PLAN 1
Type 2 diabetes mellitus   - STOP Lantus  - Stop standing nutritional lispro before each meal.  - Continue lispro moderate dose sliding scale before meals and at bedtime.  - Patient's fingerstick glucose goal is 100-180 mg/dL.    - For discharge: U500 units every 8 hours, admelog units before meals, continue ozempic, stop metformin.  - Patient can follow up at discharge with [facility] by calling [phone] to make an appointment.      Case discussed with Dr. Bowden. Primary team updated. Type 2 diabetes mellitus   - No Lantus  - No nutritional lispro before each meal.  - Continue lispro moderate dose sliding scale before meals and at bedtime.  - Patient's fingerstick glucose goal is 100-180 mg/dL.    - For discharge: U500 units every 8 hours, admelog units before meals, continue ozempic, stop metformin.  - Patient can follow up at discharge with [facility] by calling [phone] to make an appointment.      Case discussed with Dr. Bowden. Primary team updated.

## 2024-02-13 NOTE — PROGRESS NOTE ADULT - PROBLEM SELECTOR PLAN 2
Increased watery output from ostomy, associated with switching to liquids i/s/o nausea. Suspect that watery output is from having mostly liquids over the past few days. Abd with mild tenderness in lower quadrants without guarding or rebound. GI PCR negative and c diff negative. She did have one episode of blood dark mixed with bright red blood that resolved (see assessment below).   - encourage solid PO intake  - loperamide prn for diarrhea, unlikely infectious etiology Increased watery output from ostomy, associated with switching to liquids i/s/o nausea. Suspect that watery output is from having mostly liquids over the past few days. Abd with mild tenderness in lower quadrants without guarding or rebound. GI PCR negative and c diff negative. She did have one episode of blood dark mixed with bright red blood that resolved (see assessment below).   - encourage solid PO intake  - loperamide prn for diarrhea, unlikely infectious etiology  - f/u stool osmolar gap  - reglan prn, monitor for improvement of sx (more likely gastroparesis presentation)  - education on diet

## 2024-02-13 NOTE — PROGRESS NOTE ADULT - SUBJECTIVE AND OBJECTIVE BOX
O/N Events: Complaining of nausea, given tigan. Pt had increased diarrheal output from ostomy bag, reportedly bag was changed 3 times and pt also soiled bed due to leakage from ostomy bag.    Subjective/ROS: Patient seen and examined at bedside. Resting comfortably in bed, reading the bible, does not appear uncomfortable. On further questioning, pt feels pain is uncontrolled. Tearful and emotional about symptoms and feels she is not improving. Tolerated diet yesterday but feeling nauseous this AM. States she can taste her feces in her mouth.    VITALS  Vital Signs Last 24 Hrs  T(C): 36.8 (13 Feb 2024 08:40), Max: 37.2 (13 Feb 2024 06:00)  T(F): 98.3 (13 Feb 2024 08:40), Max: 98.9 (13 Feb 2024 06:00)  HR: 100 (13 Feb 2024 08:40) (86 - 100)  BP: 154/88 (13 Feb 2024 08:40) (120/75 - 154/88)  BP(mean): --  RR: 19 (13 Feb 2024 08:40) (17 - 19)  SpO2: 95% (13 Feb 2024 08:40) (95% - 97%)    Parameters below as of 13 Feb 2024 08:40  Patient On (Oxygen Delivery Method): room air        CAPILLARY BLOOD GLUCOSE      POCT Blood Glucose.: 81 mg/dL (13 Feb 2024 11:17)  POCT Blood Glucose.: 203 mg/dL (13 Feb 2024 06:34)  POCT Blood Glucose.: 106 mg/dL (12 Feb 2024 21:10)  POCT Blood Glucose.: 167 mg/dL (12 Feb 2024 17:44)  POCT Blood Glucose.: 131 mg/dL (12 Feb 2024 15:39)  POCT Blood Glucose.: 107 mg/dL (12 Feb 2024 13:59)      PHYSICAL EXAM  General: NAD  Head: pupils reactive  Neck: Supple; no JVD  Respiratory: CTAB; no wheezes/rales/rhonchi  Cardiovascular: +tachy rate; S1/S2+, no murmurs, rubs gallops   Gastrointestinal: Soft; tender  Extremities: WWP; no edema/cyanosis  Neurological: A&Ox3    MEDICATIONS  (STANDING):  acetaminophen     Tablet .. 650 milliGRAM(s) Oral every 6 hours  apixaban 5 milliGRAM(s) Oral every 12 hours  atorvastatin 80 milliGRAM(s) Oral at bedtime  clopidogrel Tablet 75 milliGRAM(s) Oral daily  dextrose 5%. 1000 milliLiter(s) (50 mL/Hr) IV Continuous <Continuous>  dextrose 50% Injectable 25 Gram(s) IV Push once  gabapentin 600 milliGRAM(s) Oral every 8 hours  glucagon  Injectable 1 milliGRAM(s) IntraMuscular once  influenza   Vaccine 0.5 milliLiter(s) IntraMuscular once  insulin lispro (ADMELOG) corrective regimen sliding scale   SubCutaneous three times a day before meals  insulin lispro (ADMELOG) corrective regimen sliding scale   SubCutaneous at bedtime  lactobacillus acidophilus 1 Tablet(s) Oral daily  lidocaine   4% Patch 1 Patch Transdermal every 24 hours  metoprolol succinate ER 50 milliGRAM(s) Oral daily  nystatin Powder 1 Application(s) Topical two times a day  pantoprazole    Tablet 40 milliGRAM(s) Oral two times a day    MEDICATIONS  (PRN):  dextrose Oral Gel 15 Gram(s) Oral once PRN Blood Glucose LESS THAN 70 milliGRAM(s)/deciliter  loperamide 2 milliGRAM(s) Oral every 6 hours PRN Diarrhea  oxyCODONE    IR 2.5 milliGRAM(s) Oral every 6 hours PRN Moderate Pain (4 - 6)  oxyCODONE    IR 5 milliGRAM(s) Oral every 6 hours PRN Severe Pain (7 - 10)      penicillin (Hives)  vancomycin (Anaphylaxis; Hives)  shellfish. (Hives; Anaphylaxis)  clindamycin (Pruritus)  Bactrim I.V. (Rash (Mod to Severe))  fish (Hives; Urticaria)  iodine containing compounds (Hives; Anaphylaxis)  amoxicillin (Short breath; Rash)      LABS                        11.1   13.02 )-----------( 464      ( 13 Feb 2024 05:30 )             37.7     02-13    133<L>  |  98  |  14  ----------------------------<  109<H>  4.0   |  24  |  0.74    Ca    9.4      13 Feb 2024 05:30  Phos  3.0     02-13  Mg     1.6     02-13        Urinalysis Basic - ( 13 Feb 2024 05:30 )    Color: x / Appearance: x / SG: x / pH: x  Gluc: 109 mg/dL / Ketone: x  / Bili: x / Urobili: x   Blood: x / Protein: x / Nitrite: x   Leuk Esterase: x / RBC: x / WBC x   Sq Epi: x / Non Sq Epi: x / Bacteria: x            Culture - Genital (collected 02-12-24 @ 17:54)  Source: Vag vaginal  Preliminary Report (02-13-24 @ 11:52):    Normal genital chrystal isolated to date        IMAGING/EKG/ETC

## 2024-02-14 ENCOUNTER — TRANSCRIPTION ENCOUNTER (OUTPATIENT)
Age: 44
End: 2024-02-14

## 2024-02-14 LAB
A1C WITH ESTIMATED AVERAGE GLUCOSE RESULT: 10.8 % — HIGH (ref 4–5.6)
ADD ON TEST-SPECIMEN IN LAB: SIGNIFICANT CHANGE UP
ANION GAP SERPL CALC-SCNC: 13 MMOL/L — SIGNIFICANT CHANGE UP (ref 5–17)
ANISOCYTOSIS BLD QL: SIGNIFICANT CHANGE UP
BASOPHILS # BLD AUTO: 0 K/UL — SIGNIFICANT CHANGE UP (ref 0–0.2)
BASOPHILS NFR BLD AUTO: 0 % — SIGNIFICANT CHANGE UP (ref 0–2)
BUN SERPL-MCNC: 16 MG/DL — SIGNIFICANT CHANGE UP (ref 7–23)
BURR CELLS BLD QL SMEAR: PRESENT — SIGNIFICANT CHANGE UP
CALCIUM SERPL-MCNC: 9.1 MG/DL — SIGNIFICANT CHANGE UP (ref 8.4–10.5)
CHLORIDE SERPL-SCNC: 101 MMOL/L — SIGNIFICANT CHANGE UP (ref 96–108)
CO2 SERPL-SCNC: 22 MMOL/L — SIGNIFICANT CHANGE UP (ref 22–31)
CREAT SERPL-MCNC: 0.81 MG/DL — SIGNIFICANT CHANGE UP (ref 0.5–1.3)
CULTURE RESULTS: SIGNIFICANT CHANGE UP
EGFR: 92 ML/MIN/1.73M2 — SIGNIFICANT CHANGE UP
EOSINOPHIL # BLD AUTO: 0.94 K/UL — HIGH (ref 0–0.5)
EOSINOPHIL NFR BLD AUTO: 7 % — HIGH (ref 0–6)
ESTIMATED AVERAGE GLUCOSE: 263 MG/DL — HIGH (ref 68–114)
GIANT PLATELETS BLD QL SMEAR: PRESENT — SIGNIFICANT CHANGE UP
GLUCOSE BLDC GLUCOMTR-MCNC: 137 MG/DL — HIGH (ref 70–99)
GLUCOSE BLDC GLUCOMTR-MCNC: 223 MG/DL — HIGH (ref 70–99)
GLUCOSE BLDC GLUCOMTR-MCNC: 250 MG/DL — HIGH (ref 70–99)
GLUCOSE BLDC GLUCOMTR-MCNC: 54 MG/DL — CRITICAL LOW (ref 70–99)
GLUCOSE BLDC GLUCOMTR-MCNC: 69 MG/DL — LOW (ref 70–99)
GLUCOSE BLDC GLUCOMTR-MCNC: 80 MG/DL — SIGNIFICANT CHANGE UP (ref 70–99)
GLUCOSE BLDC GLUCOMTR-MCNC: 88 MG/DL — SIGNIFICANT CHANGE UP (ref 70–99)
GLUCOSE BLDC GLUCOMTR-MCNC: 97 MG/DL — SIGNIFICANT CHANGE UP (ref 70–99)
GLUCOSE SERPL-MCNC: 64 MG/DL — LOW (ref 70–99)
HCT VFR BLD CALC: 39.9 % — SIGNIFICANT CHANGE UP (ref 34.5–45)
HGB BLD-MCNC: 11 G/DL — LOW (ref 11.5–15.5)
HYPOCHROMIA BLD QL: SIGNIFICANT CHANGE UP
LYMPHOCYTES # BLD AUTO: 14.9 % — SIGNIFICANT CHANGE UP (ref 13–44)
LYMPHOCYTES # BLD AUTO: 2 K/UL — SIGNIFICANT CHANGE UP (ref 1–3.3)
MAGNESIUM SERPL-MCNC: 1.7 MG/DL — SIGNIFICANT CHANGE UP (ref 1.6–2.6)
MANUAL SMEAR VERIFICATION: SIGNIFICANT CHANGE UP
MCHC RBC-ENTMCNC: 21.3 PG — LOW (ref 27–34)
MCHC RBC-ENTMCNC: 27.6 GM/DL — LOW (ref 32–36)
MCV RBC AUTO: 77.2 FL — LOW (ref 80–100)
MICROCYTES BLD QL: SIGNIFICANT CHANGE UP
MONOCYTES # BLD AUTO: 1.18 K/UL — HIGH (ref 0–0.9)
MONOCYTES NFR BLD AUTO: 8.8 % — SIGNIFICANT CHANGE UP (ref 2–14)
MYELOCYTES NFR BLD: 0.9 % — HIGH (ref 0–0)
NEUTROPHILS # BLD AUTO: 9.17 K/UL — HIGH (ref 1.8–7.4)
NEUTROPHILS NFR BLD AUTO: 68.4 % — SIGNIFICANT CHANGE UP (ref 43–77)
OVALOCYTES BLD QL SMEAR: SLIGHT — SIGNIFICANT CHANGE UP
PHOSPHATE SERPL-MCNC: 3.5 MG/DL — SIGNIFICANT CHANGE UP (ref 2.5–4.5)
PLAT MORPH BLD: ABNORMAL
PLATELET # BLD AUTO: 482 K/UL — HIGH (ref 150–400)
POIKILOCYTOSIS BLD QL AUTO: SLIGHT — SIGNIFICANT CHANGE UP
POLYCHROMASIA BLD QL SMEAR: SLIGHT — SIGNIFICANT CHANGE UP
POTASSIUM SERPL-MCNC: 4.4 MMOL/L — SIGNIFICANT CHANGE UP (ref 3.5–5.3)
POTASSIUM SERPL-SCNC: 4.4 MMOL/L — SIGNIFICANT CHANGE UP (ref 3.5–5.3)
RBC # BLD: 5.17 M/UL — SIGNIFICANT CHANGE UP (ref 3.8–5.2)
RBC # FLD: 16.2 % — HIGH (ref 10.3–14.5)
RBC BLD AUTO: ABNORMAL
SODIUM SERPL-SCNC: 136 MMOL/L — SIGNIFICANT CHANGE UP (ref 135–145)
SPECIMEN SOURCE: SIGNIFICANT CHANGE UP
WBC # BLD: 13.4 K/UL — HIGH (ref 3.8–10.5)
WBC # FLD AUTO: 13.4 K/UL — HIGH (ref 3.8–10.5)

## 2024-02-14 PROCEDURE — 99232 SBSQ HOSP IP/OBS MODERATE 35: CPT | Mod: GC

## 2024-02-14 PROCEDURE — 99232 SBSQ HOSP IP/OBS MODERATE 35: CPT

## 2024-02-14 RX ORDER — OXYCODONE HYDROCHLORIDE 5 MG/1
2.5 TABLET ORAL EVERY 6 HOURS
Refills: 0 | Status: DISCONTINUED | OUTPATIENT
Start: 2024-02-14 | End: 2024-02-17

## 2024-02-14 RX ORDER — LOPERAMIDE HCL 2 MG
2 TABLET ORAL EVERY 6 HOURS
Refills: 0 | Status: DISCONTINUED | OUTPATIENT
Start: 2024-02-14 | End: 2024-02-20

## 2024-02-14 RX ORDER — ACETAMINOPHEN 500 MG
975 TABLET ORAL EVERY 8 HOURS
Refills: 0 | Status: DISCONTINUED | OUTPATIENT
Start: 2024-02-14 | End: 2024-02-20

## 2024-02-14 RX ORDER — METOCLOPRAMIDE HCL 10 MG
5 TABLET ORAL EVERY 8 HOURS
Refills: 0 | Status: DISCONTINUED | OUTPATIENT
Start: 2024-02-14 | End: 2024-02-18

## 2024-02-14 RX ORDER — LOPERAMIDE HCL 2 MG
4 TABLET ORAL ONCE
Refills: 0 | Status: COMPLETED | OUTPATIENT
Start: 2024-02-14 | End: 2024-02-14

## 2024-02-14 RX ORDER — KETOROLAC TROMETHAMINE 30 MG/ML
15 SYRINGE (ML) INJECTION EVERY 8 HOURS
Refills: 0 | Status: DISCONTINUED | OUTPATIENT
Start: 2024-02-14 | End: 2024-02-15

## 2024-02-14 RX ADMIN — GABAPENTIN 600 MILLIGRAM(S): 400 CAPSULE ORAL at 22:16

## 2024-02-14 RX ADMIN — Medication 15 MILLIGRAM(S): at 11:15

## 2024-02-14 RX ADMIN — OXYCODONE HYDROCHLORIDE 2.5 MILLIGRAM(S): 5 TABLET ORAL at 19:58

## 2024-02-14 RX ADMIN — Medication 4 MILLIGRAM(S): at 17:46

## 2024-02-14 RX ADMIN — Medication 975 MILLIGRAM(S): at 18:58

## 2024-02-14 RX ADMIN — OXYCODONE HYDROCHLORIDE 5 MILLIGRAM(S): 5 TABLET ORAL at 14:22

## 2024-02-14 RX ADMIN — GABAPENTIN 600 MILLIGRAM(S): 400 CAPSULE ORAL at 06:45

## 2024-02-14 RX ADMIN — Medication 15 MILLIGRAM(S): at 22:15

## 2024-02-14 RX ADMIN — OXYCODONE HYDROCHLORIDE 5 MILLIGRAM(S): 5 TABLET ORAL at 07:44

## 2024-02-14 RX ADMIN — OXYCODONE HYDROCHLORIDE 2.5 MILLIGRAM(S): 5 TABLET ORAL at 18:58

## 2024-02-14 RX ADMIN — Medication 650 MILLIGRAM(S): at 00:42

## 2024-02-14 RX ADMIN — Medication 5 MILLIGRAM(S): at 14:02

## 2024-02-14 RX ADMIN — OXYCODONE HYDROCHLORIDE 5 MILLIGRAM(S): 5 TABLET ORAL at 06:44

## 2024-02-14 RX ADMIN — OXYCODONE HYDROCHLORIDE 5 MILLIGRAM(S): 5 TABLET ORAL at 13:22

## 2024-02-14 RX ADMIN — ATORVASTATIN CALCIUM 80 MILLIGRAM(S): 80 TABLET, FILM COATED ORAL at 22:16

## 2024-02-14 RX ADMIN — APIXABAN 5 MILLIGRAM(S): 2.5 TABLET, FILM COATED ORAL at 18:58

## 2024-02-14 RX ADMIN — Medication 975 MILLIGRAM(S): at 23:26

## 2024-02-14 RX ADMIN — Medication 5 MILLIGRAM(S): at 06:46

## 2024-02-14 RX ADMIN — APIXABAN 5 MILLIGRAM(S): 2.5 TABLET, FILM COATED ORAL at 06:44

## 2024-02-14 RX ADMIN — CLOPIDOGREL BISULFATE 75 MILLIGRAM(S): 75 TABLET, FILM COATED ORAL at 09:49

## 2024-02-14 RX ADMIN — Medication 2 MILLIGRAM(S): at 23:30

## 2024-02-14 RX ADMIN — Medication 650 MILLIGRAM(S): at 06:44

## 2024-02-14 RX ADMIN — GABAPENTIN 600 MILLIGRAM(S): 400 CAPSULE ORAL at 14:02

## 2024-02-14 RX ADMIN — Medication 5 MILLIGRAM(S): at 22:15

## 2024-02-14 RX ADMIN — NYSTATIN CREAM 1 APPLICATION(S): 100000 CREAM TOPICAL at 18:58

## 2024-02-14 RX ADMIN — Medication 15 MILLIGRAM(S): at 10:55

## 2024-02-14 RX ADMIN — PANTOPRAZOLE SODIUM 40 MILLIGRAM(S): 20 TABLET, DELAYED RELEASE ORAL at 06:44

## 2024-02-14 RX ADMIN — Medication 15 MILLIGRAM(S): at 22:45

## 2024-02-14 RX ADMIN — Medication 50 MILLIGRAM(S): at 06:43

## 2024-02-14 RX ADMIN — Medication 4: at 17:29

## 2024-02-14 RX ADMIN — PANTOPRAZOLE SODIUM 40 MILLIGRAM(S): 20 TABLET, DELAYED RELEASE ORAL at 18:58

## 2024-02-14 RX ADMIN — Medication 975 MILLIGRAM(S): at 10:55

## 2024-02-14 RX ADMIN — NYSTATIN CREAM 1 APPLICATION(S): 100000 CREAM TOPICAL at 06:48

## 2024-02-14 NOTE — PROGRESS NOTE ADULT - PROBLEM SELECTOR PLAN 2
Increased watery output from ostomy, associated with switching to liquids i/s/o nausea. Suspect that watery output is from having mostly liquids over the past few days vs osmolar shifts due to changes in glucose. Abd with mild tenderness in lower quadrants without guarding or rebound. GI PCR negative and c diff negative.  - encourage solid PO intake  - imodium prn for diarrhea, unlikely infectious etiology  - f/u surgery recs  - f/u stool osmolar gap  - monitor ostomy output (should not be greater than 500-1000cc/24 hours)  - education on diet

## 2024-02-14 NOTE — CONSULT NOTE ADULT - CONSULT REASON
Evaluation of colostomy stoma
Hypoglycemia
hypokalemia, hypoglycemia
Nausea/vomiting
bulging sensation

## 2024-02-14 NOTE — PROGRESS NOTE ADULT - SUBJECTIVE AND OBJECTIVE BOX
SUBJECTIVE / INTERVAL HPI: Patient was seen and examined this morning. Continues to have output from ostomy and nausea but vomiting. She has not received any short acting insulin since 2/13 AM, and has no lantus on board. CGM x 24 hours interrogated. Difficult to correlate patient's reported treatment with SG and FSG. She ate half her dinner last night. Glucose went up postprandially above 250, but then started dropping. Pt initially reported eating precautionary snack of 1/2 cup of juice, yogurt, and crackers, and then later reported 1 yogurt only. Hypoglycemia at 4am treated with 4 cups of juice with sugar and dropped again 3 hours later (reactive hypoglycemia to lots of juice?), and was stead the rest of the morning and went up after dinner.     CAPILLARY BLOOD GLUCOSE & INSULIN RECEIVED  81 mg/dL (02-13 @ 11:17)  128 mg/dL (02-13 @ 17:05)  192 mg/dL (02-13 @ 19:20)  181 mg/dL (02-13 @ 22:06)  54 mg/dL (02-14 @ 03:52)  80 mg/dL (02-14 @ 04:16)  88 mg/dL (02-14 @ 06:37)  64 mg/dL (02-14 @ 07:54)  69 mg/dL (02-14 @ 09:26)  97 mg/dL (02-14 @ 10:15)  137 mg/dL (02-14 @ 12:56)  223 mg/dL (02-14 @ 16:56)      REVIEW OF SYSTEMS  Constitutional:  Negative fever, chills or loss of appetite.  Eyes:  Negative blurry vision or double vision.  Cardiovascular:  Negative for chest pain or palpitations.  Respiratory:  Negative for cough, wheezing, or shortness of breath.    Gastrointestinal:  Negative for nausea, vomiting, diarrhea, constipation, or abdominal pain.  Genitourinary:  Negative frequency, urgency or dysuria.  Neurologic:  No headache, confusion, dizziness, lightheadedness.    PHYSICAL EXAM  Vital Signs Last 24 Hrs  T(C): 36.4 (14 Feb 2024 16:35), Max: 37.1 (14 Feb 2024 05:57)  T(F): 97.6 (14 Feb 2024 16:35), Max: 98.8 (14 Feb 2024 05:57)  HR: 93 (14 Feb 2024 16:35) (90 - 111)  BP: 124/81 (14 Feb 2024 16:35) (100/52 - 144/84)  BP(mean): 95 (14 Feb 2024 16:35) (84 - 95)  RR: 16 (14 Feb 2024 16:35) (16 - 20)  SpO2: 98% (14 Feb 2024 16:35) (92% - 98%)    Parameters below as of 14 Feb 2024 16:35  Patient On (Oxygen Delivery Method): room air      Constitutional: Awake, alert, in no acute distress. Obese  HEENT: Normocephalic, atraumatic, NAOMI.  Respiratory: Lungs clear to ausculation bilaterally.   Cardiovascular: regular rhythm, normal S1 and S2, no audible murmurs.   GI: soft, non-tender, non-distended, bowel sounds present. + ostomy  Extremities: No lower extremity edema.  Psychiatric: AAO x 3. Normal affect/mood.     LABS  CBC - WBC/HGB/HTC/PLT: 13.40/11.0/39.9/482 (02-14-24)  BMP - Na/K/Cl/Bicarb/BUN/Cr/Gluc/AG/eGFR: 136/4.4/101/22/16/0.81/64/13/92 (02-14-24)  Ca - 9.1 (02-14-24)  Phos - 3.5 (02-14-24)  Mg - 1.7 (02-14-24)  LFT - Alb/Tprot/Tbili/Dbili/AlkPhos/ALT/AST: 3.0/--/<0.2/0.2/97/6/13 (02-11-24)  PT/aPTT/INR: 10.8/28.3/0.95 (02-11-24)   Thyroid Stimulating Hormone, Serum: 0.797 (12-31-23)      MEDICATIONS  MEDICATIONS  (STANDING):  acetaminophen     Tablet .. 975 milliGRAM(s) Oral every 8 hours  apixaban 5 milliGRAM(s) Oral every 12 hours  atorvastatin 80 milliGRAM(s) Oral at bedtime  clopidogrel Tablet 75 milliGRAM(s) Oral daily  dextrose 5%. 1000 milliLiter(s) (50 mL/Hr) IV Continuous <Continuous>  dextrose 50% Injectable 25 Gram(s) IV Push once  gabapentin 600 milliGRAM(s) Oral every 8 hours  glucagon  Injectable 1 milliGRAM(s) IntraMuscular once  influenza   Vaccine 0.5 milliLiter(s) IntraMuscular once  insulin lispro (ADMELOG) corrective regimen sliding scale   SubCutaneous three times a day before meals  insulin lispro (ADMELOG) corrective regimen sliding scale   SubCutaneous at bedtime  lactobacillus acidophilus 1 Tablet(s) Oral daily  lidocaine   4% Patch 1 Patch Transdermal every 24 hours  metoclopramide 5 milliGRAM(s) Oral every 8 hours  metoprolol succinate ER 50 milliGRAM(s) Oral daily  nystatin Powder 1 Application(s) Topical two times a day  pantoprazole    Tablet 40 milliGRAM(s) Oral two times a day    MEDICATIONS  (PRN):  dextrose Oral Gel 15 Gram(s) Oral once PRN Blood Glucose LESS THAN 70 milliGRAM(s)/deciliter  ketorolac   Injectable 15 milliGRAM(s) IV Push every 8 hours PRN Moderate Pain (4 - 6)  loperamide 2 milliGRAM(s) Oral every 6 hours PRN Diarrhea  oxyCODONE    IR 2.5 milliGRAM(s) Oral every 6 hours PRN Severe Pain (7 - 10)

## 2024-02-14 NOTE — PROGRESS NOTE ADULT - PROBLEM SELECTOR PLAN 1
Type 2 diabetes mellitus   - C-peptide, insulin, and inuslin abs ordered.  - No Lantus  - No nutritional lispro before each meal.  - Continue lispro moderate dose sliding scale before meals and at bedtime.  - Patient's fingerstick glucose goal is 100-180 mg/dL.    - For discharge: U500 units every 8 hours, admelog units before meals, stop ozempic due to c/f gastroparesis, stop metformin in setting of increase ostomy output.  - Patient can follow up at discharge with [facility] by calling [phone] to make an appointment.      Case discussed with Dr. Bowden. Primary team updated.

## 2024-02-14 NOTE — PROGRESS NOTE ADULT - SUBJECTIVE AND OBJECTIVE BOX
O/N Events: Ostomy bag leaked, changed x1. Hypoglycemic to 50s. Tachy 110, due for pain meds.    Subjective/ROS: Patient seen and examined at bedside. Resting comfortably on RA, seen ambulating when entering room. Reports having to change ostomy bag twice, very frustrated with her care and lack of improvement in symptoms.    VITALS  Vital Signs Last 24 Hrs  T(C): 36.5 (14 Feb 2024 13:57), Max: 37.1 (14 Feb 2024 05:57)  T(F): 97.7 (14 Feb 2024 13:57), Max: 98.8 (14 Feb 2024 05:57)  HR: 90 (14 Feb 2024 13:57) (90 - 111)  BP: 100/52 (14 Feb 2024 13:57) (100/52 - 144/84)  BP(mean): 84 (14 Feb 2024 05:57) (84 - 84)  RR: 16 (14 Feb 2024 13:57) (16 - 20)  SpO2: 96% (14 Feb 2024 13:57) (92% - 97%)    Parameters below as of 14 Feb 2024 13:57  Patient On (Oxygen Delivery Method): room air        CAPILLARY BLOOD GLUCOSE      POCT Blood Glucose.: 137 mg/dL (14 Feb 2024 12:56)  POCT Blood Glucose.: 97 mg/dL (14 Feb 2024 10:15)  POCT Blood Glucose.: 69 mg/dL (14 Feb 2024 09:26)  POCT Blood Glucose.: 88 mg/dL (14 Feb 2024 06:37)  POCT Blood Glucose.: 80 mg/dL (14 Feb 2024 04:16)  POCT Blood Glucose.: 54 mg/dL (14 Feb 2024 03:52)  POCT Blood Glucose.: 181 mg/dL (13 Feb 2024 22:06)  POCT Blood Glucose.: 192 mg/dL (13 Feb 2024 19:20)  POCT Blood Glucose.: 128 mg/dL (13 Feb 2024 17:05)      PHYSICAL EXAM  General: NAD  Head: pupils reactive  Neck: Supple; no JVD  Respiratory: CTAB; no wheezes/rales/rhonchi  Cardiovascular: Regular rhythm/rate; S1/S2+, no murmurs, rubs gallops   Gastrointestinal: abd soft, lower abd tenderness, ostomy site clean with mild erythema, bowel sound auscultated RLQ  Extremities: WWP; no edema/cyanosis  Neurological: A&Ox3    MEDICATIONS  (STANDING):  acetaminophen     Tablet .. 975 milliGRAM(s) Oral every 8 hours  apixaban 5 milliGRAM(s) Oral every 12 hours  atorvastatin 80 milliGRAM(s) Oral at bedtime  clopidogrel Tablet 75 milliGRAM(s) Oral daily  dextrose 5%. 1000 milliLiter(s) (50 mL/Hr) IV Continuous <Continuous>  dextrose 50% Injectable 25 Gram(s) IV Push once  gabapentin 600 milliGRAM(s) Oral every 8 hours  glucagon  Injectable 1 milliGRAM(s) IntraMuscular once  influenza   Vaccine 0.5 milliLiter(s) IntraMuscular once  insulin lispro (ADMELOG) corrective regimen sliding scale   SubCutaneous three times a day before meals  insulin lispro (ADMELOG) corrective regimen sliding scale   SubCutaneous at bedtime  lactobacillus acidophilus 1 Tablet(s) Oral daily  lidocaine   4% Patch 1 Patch Transdermal every 24 hours  metoclopramide 5 milliGRAM(s) Oral every 8 hours  metoprolol succinate ER 50 milliGRAM(s) Oral daily  nystatin Powder 1 Application(s) Topical two times a day  pantoprazole    Tablet 40 milliGRAM(s) Oral two times a day    MEDICATIONS  (PRN):  dextrose Oral Gel 15 Gram(s) Oral once PRN Blood Glucose LESS THAN 70 milliGRAM(s)/deciliter  ketorolac   Injectable 15 milliGRAM(s) IV Push every 8 hours PRN Moderate Pain (4 - 6)  loperamide 2 milliGRAM(s) Oral every 6 hours PRN Diarrhea  oxyCODONE    IR 2.5 milliGRAM(s) Oral every 6 hours PRN Severe Pain (7 - 10)      penicillin (Hives)  vancomycin (Anaphylaxis; Hives)  shellfish. (Hives; Anaphylaxis)  clindamycin (Pruritus)  Bactrim I.V. (Rash (Mod to Severe))  fish (Hives; Urticaria)  iodine containing compounds (Hives; Anaphylaxis)  amoxicillin (Short breath; Rash)      LABS                        11.0   13.40 )-----------( 482      ( 14 Feb 2024 07:54 )             39.9     02-14    136  |  101  |  16  ----------------------------<  64<L>  4.4   |  22  |  0.81    Ca    9.1      14 Feb 2024 07:54  Phos  3.5     02-14  Mg     1.7     02-14        Urinalysis Basic - ( 14 Feb 2024 07:54 )    Color: x / Appearance: x / SG: x / pH: x  Gluc: 64 mg/dL / Ketone: x  / Bili: x / Urobili: x   Blood: x / Protein: x / Nitrite: x   Leuk Esterase: x / RBC: x / WBC x   Sq Epi: x / Non Sq Epi: x / Bacteria: x              IMAGING/EKG/ETC

## 2024-02-14 NOTE — PROGRESS NOTE ADULT - PROBLEM SELECTOR PLAN 7
Last here in January with change in regimen to humulin 70U TID, admelog 40U TID before meals, metformin 1g qd. A1c last admission 16.4. Per endocrinology, pt was taking metformin leading up to admission.   - holding for now i/s/o hypoglycemia, will discuss with endocrine  - q6 fsg  - discontinue metformin on DC, may be contributing to GI sx  - f/u endo recs

## 2024-02-14 NOTE — DISCHARGE NOTE PROVIDER - CARE PROVIDER_API CALL
Nisha Joseph  Endocrinology/Metab/Diabetes  [address] [place] [id number]  Phone: [phone]  Fax: [phone]  Established Patient  Follow Up Time: 2 weeks   Nisha Joseph  Endocrinology/Metab/Diabetes  [facility] [place] [address]  Phone: [phone]  Fax: [phone]  Established Patient  Follow Up Time: 2 weeks    Hema Hurtado  Surgery  [address] [place] [id number]  Phone: [phone]  Fax: [phone]  Established Patient  Follow Up Time: 1 month   Nisha Joseph  Endocrinology/Metab/Diabetes  [address] [place] [address]  Phone: [phone]  Fax: [phone]  Established Patient  Follow Up Time: 2 weeks    Hema Hurtado  Surgery  [address] [address]  Phone: [phone]  Fax: [phone]  Established Patient  Follow Up Time: 1 month    Do Rosario Myers  Pain Medicine  [address] [place] [address]  Phone: [phone]  Fax: [phone]  Follow Up Time:

## 2024-02-14 NOTE — DISCHARGE NOTE PROVIDER - NSDCCPCAREPLAN_GEN_ALL_CORE_FT
PRINCIPAL DISCHARGE DIAGNOSIS  Diagnosis: Abdominal pain  Assessment and Plan of Treatment:       SECONDARY DISCHARGE DIAGNOSES  Diagnosis: Diarrhea  Assessment and Plan of Treatment:     Diagnosis: Multiple episodes of hypoglycemia  Assessment and Plan of Treatment:      PRINCIPAL DISCHARGE DIAGNOSIS  Diagnosis: Abdominal pain  Assessment and Plan of Treatment: Gastroparesis is a chronic disorder which means delayed stomach emptying without a blockage, tht could explain changes your appetite, bowel movements, and some of your abdominal discomfort. In healthy people, when the stomach is functioning normally, contractions of the stomach help to crush ingested food and then propel the pulverized food into the small intestine where further digestion and absorption of nutrients occurs.  Symptoms include fullness after meals, pain, nausea vomiting, weight loss, belching and bloating. Certain foods like fatty foods, or carbonation may cause symptoms. The feeling of fullness after starting a meal is very common. Weight loss may also occur.  Diabetes is one of the more common causes. For this reason, it is very important that you continue to follow up closely with your endocrinologists, to ensure that your blood sugar levels are well-controlled, through both diet and medications. Please also follow up with your surgeons regarding reversal of the ostomy, for which you will need to continue to ensure that you are optimized, especially in terms of your nutritional health and good management of your diabetes.        SECONDARY DISCHARGE DIAGNOSES  Diagnosis: Multiple episodes of hypoglycemia  Assessment and Plan of Treatment:     Diagnosis: Diarrhea  Assessment and Plan of Treatment:

## 2024-02-14 NOTE — PROVIDER CONTACT NOTE (HYPOGLYCEMIA EVENT) - NS PROVIDER CONTACT NOTE-TREATMENT-HYPO
patient had 4 oz juice and snack at bedside. blood glucose increased to 97, MD Stringer aware and no interventions needed. Blood glucose rechecked prior to lunch and no coverage needed/4 oz Fruit Juice (Specify quantity, date/time)

## 2024-02-14 NOTE — PROGRESS NOTE ADULT - ASSESSMENT
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 GI consulted for nausea/vomiting in the setting of a HbA1c of 16 in January 2024.     Suspect symptoms all 2/2 to poorly controlled DM2 and diabetic gastroparesis, and that symptom resolution will not resolve until patient has better glycemic control. GI PCR and c diff negative.     Recommendations:   - can send stool Na, K, and osmolarity to calculate stool osmolar gap  - f/u fecal calprotectin and fecal elastase, though suspect symptoms due to poorly controlled DM2   - ostomy nurse consulted for high out-put from ostomy, recommendations appreciated   - continue Reglan PRN for nausea and gastroparesis symptoms if QTC <500   - patient should be advised to have smaller more frequent meals   - can trail loperamide to help reduce ostomy out-put (start 4 mg PO, followed by 2 mg after each loose stool, and never  exceeding 16 mg PO daily)     Case discussed with Dr. Doan. GI Team will sign off. Please re-consult with any questions or concerns.     Nancy Bundy D.O.   Gastroenterology Fellow  Weekday 7am-5pm Pager: [phone]  Weeknights/Weekend/Holiday Coverage: Please call the  for contact

## 2024-02-14 NOTE — DISCHARGE NOTE PROVIDER - HOSPITAL COURSE
43F PMH CVA (residual L>R weakness), PE (on Eliquis), multiple abdominal hernia repairs, nec fasc of abdomen s/p corbin procedure w ostomy, uncontrolled DM,  CAD with multiple stents (last in August 2023) presents with n/v, increased diarrheal ostomy output, abdominal pain, and low blood sugar. Endo consulted on admission for diabetes management, pt home nutritional insulin and lantus was discontinued due to persistent hypoglycemic episodes. Initially requiring dextrose infusion for glucose control, now discontinued. GI consulted, recommended reglan due to c/f gastroparesis. Surgery consulted due to increased ostomy output. Pain initially managed with opioid medications, de-escalated during hospital course. Pt medically ready for discharge.    Problem List:   #Abdominal pain with vomiting  Presenting with lower abdominal pressure like pain, as well as nausea, voming, and diarrhea. Pt initially treated with tigan, transitioned to reglan once QTc improved.    #     #    Patient was discharged to: (home/[facility]/acute rehab/hospice, etc. and w/ home health/home PT/RN/home O2)    New medications:   Changes to old medications:  Medications that were stopped:    Items to follow up as outpatient:    Physical exam at the time of discharge:       LABS & STUDIES:  SARS-CoV-2: Chiarateifeanyi (31 Dec 2023 12:46)   43F PMH CVA (residual L>R weakness), PE (on Eliquis), multiple abdominal hernia repairs, nec fasc of abdomen s/p corbin procedure w ostomy, uncontrolled DM,  CAD with multiple stents (last in August 2023) presents with n/v, increased diarrheal ostomy output, abdominal pain, and low blood sugar. Initially meeting SIRS criteria with elevated lactate, improved with fluids. Endo consulted on admission for diabetes management, pt home nutritional insulin and lantus was discontinued due to persistent hypoglycemic episodes. Initially requiring dextrose infusion for glucose control, now discontinued. GYN consulted for evaluation of lower abdominal pain and possible GYN etiology of sx. GI consulted, recommended reglan due to c/f gastroparesis. Surgery consulted due to increased ostomy output. Pain initially managed with opioid medications, de-escalated during hospital course. Pt medically ready for discharge.    Problem List:   #Abdominal pain with vomiting  Presenting with lower abdominal pressure like pain, as well as nausea, vomiting, and diarrhea. Pt initially treated with tigan, transitioned to reglan once QTc improved. Abdominal pain likely 2/2 gastroparesis vs poor glycemic control.  - Continue reglan PO q8 as needed  - follow up outpatient endocrinology     #Diarrhea  Presenting with increased ostomy output. Also reporting 1 episode of red bloody output from ostomy bag at home. Started on loperamide. Surgery and ostomy nurse consulted. GI PCR negative.     #Diabetes.   ·  Plan: Last here in January with change in regimen to humulin 70U TID, admelog 40U TID before meals, metformin 1g qd. A1c last admission 16.4, now downtrending.  - discontinue metformin and ozempic  - outpatient endo f/u.      Patient was discharged to: (home/[facility]/acute rehab/hospice, etc. and w/ home health/home PT/RN/home O2)    New medications:   Changes to old medications:  Medications that were stopped:    Items to follow up as outpatient:    Physical exam at the time of discharge:       LABS & STUDIES:  SARS-CoV-2: Chiaratec (31 Dec 2023 12:46)   43F PMH CVA (residual L>R weakness), PE (on Eliquis), multiple abdominal hernia repairs, nec fasc of abdomen s/p corbin procedure w ostomy, uncontrolled DM,  CAD with multiple stents (last in August 2023) presents with n/v, increased diarrheal ostomy output, abdominal pain, and low blood sugar. Initially meeting SIRS criteria with elevated lactate, improved with fluids. Endo consulted on admission for diabetes management, pt home nutritional insulin and lantus was discontinued due to persistent hypoglycemic episodes. Initially requiring dextrose infusion for glucose control, now discontinued. GYN consulted for evaluation of lower abdominal pain and possible GYN etiology of sx. GI consulted, recommended reglan due to c/f gastroparesis. Surgery consulted due to increased ostomy output. Pain initially managed with opioid medications, de-escalated during hospital course. Pt medically ready for discharge.    Problem List:   #Abdominal pain with vomiting  Presenting with lower abdominal pressure like pain, as well as nausea, vomiting, and diarrhea. Pt initially treated with tigan, transitioned to reglan once QTc improved. Abdominal pain likely 2/2 gastroparesis vs poor glycemic control.  - Continue reglan PO q8 as needed  - follow up outpatient endocrinology     #Diarrhea  Presenting with increased ostomy output. Also reporting 1 episode of red bloody output from ostomy bag at home. Started on loperamide. Surgery and ostomy nurse consulted. GI PCR negative.   - Continue loperamide 2mg as needed for diarrhea  - Outpatient surgery followup and optimization for ostomy reversal    #Diabetes.   ·  Plan: Last here in January with change in regimen to humulin 70U TID, admelog 40U TID before meals, metformin 1g qd. A1c last admission 16.4, now downtrending. Noted to have episodes of bleeding in ostomy bag (2x this admission, 1x at home), evaluated by GI and surgery, likely superficial bleed as pt hemodynamically stable this admission and episodes now resolved.  - discontinue metformin and ozempic  - outpatient endo f/u      Patient was discharged to: Home    New medications:   Changes to old medications:  Medications that were stopped:    Items to follow up as outpatient:    Physical exam at the time of discharge:       LABS & STUDIES:  SARS-CoV-2: Chiaratec (31 Dec 2023 12:46)   43F PMH CVA (residual L>R weakness), PE (on Eliquis), multiple abdominal hernia repairs, nec fasc of abdomen s/p corbin procedure w ostomy, uncontrolled DM,  CAD with multiple stents (last in August 2023) presents with n/v, increased diarrheal ostomy output, abdominal pain, and low blood sugar. Initially meeting SIRS criteria with elevated lactate, improved with fluids. Endo consulted on admission for diabetes management, pt home nutritional insulin and lantus was discontinued due to persistent hypoglycemic episodes. Initially requiring dextrose infusion for glucose control, now discontinued. GYN consulted for evaluation of lower abdominal pain and possible GYN etiology of sx. GI consulted, recommended reglan due to c/f gastroparesis. Surgery consulted due to increased ostomy output. Pain initially managed with opioid medications, de-escalated during hospital course. Pt medically ready for discharge.    Problem List:   #Abdominal pain with vomiting  Presenting with lower abdominal pressure like pain, as well as nausea, vomiting, and diarrhea. Pt initially treated with tigan, transitioned to reglan once QTc improved. Abdominal pain likely 2/2 gastroparesis vs poor glycemic control.  - Continue reglan PO q8 as needed  - follow up outpatient endocrinology     #Diarrhea  Presenting with increased ostomy output. Also reporting 1 episode of red bloody output from ostomy bag at home. Started on loperamide. Surgery and ostomy nurse consulted. GI PCR negative.   - Continue loperamide 2mg as needed for diarrhea  - Outpatient surgery followup and optimization for ostomy reversal    #Diabetes.   ·  Plan: Last here in January with change in regimen to humulin 70U TID, admelog 40U TID before meals, metformin 1g qd. A1c last admission 16.4, now downtrending. Noted to have episodes of bleeding in ostomy bag (2x this admission, 1x at home), evaluated by GI and surgery, likely superficial bleed as pt hemodynamically stable this admission and episodes now resolved.  - discontinue metformin and ozempic (c/f gastroparesis, increased ostomy output)  - fingerstick glucose goal is 100-180 mg/dL  - continue ****  - outpatient follow up w endocrine      Patient was discharged to: Home    New medications:   Changes to old medications:  Medications that were stopped:    Items to follow up as outpatient:    Physical exam at the time of discharge:     PHYSICAL EXAM:  Constitutional: Obese f in NAD, comfortable in bed.  HEENT: NC/AT, PERRLA, EOMI, no conjunctival pallor or scleral icterus, MMM  Neck: Supple, no JVD  Respiratory: CTA B/L. No w/r/r.   Cardiovascular: RRR, normal S1 and S2, no m/r/g.   Gastrointestinal: +BS, soft, nt/nd, parastomal hernia, ostomy pink, no active bleed, firm, small stool in bag  Extremities: wwp; no cyanosis, clubbing or edema.   Vascular: Pulses equal and strong throughout.   Neurological: AAOx3, no CN deficits, strength and sensation intact throughout.   Skin: No gross skin abnormalities or rashes   43F PMH CVA (residual L>R weakness), PE (on Eliquis), multiple abdominal hernia repairs, nec fasc of abdomen s/p corbin procedure w ostomy, uncontrolled DM,  CAD with multiple stents (last in August 2023) presents with n/v, increased diarrheal ostomy output, abdominal pain, and low blood sugar. Initially meeting SIRS criteria with elevated lactate, improved with fluids. Endo consulted on admission for diabetes management, pt home nutritional insulin and lantus was discontinued due to persistent hypoglycemic episodes. Initially requiring dextrose infusion for glucose control, now discontinued. GYN consulted for evaluation of lower abdominal pain and possible GYN etiology of sx. GI consulted, recommended reglan due to c/f gastroparesis. Surgery consulted due to increased ostomy output. Pain initially managed with opioid medications, de-escalated during hospital course. Pt medically ready for discharge.    Problem List:   #Abdominal pain with vomiting  Presenting with lower abdominal pressure like pain, as well as nausea, vomiting, and diarrhea. Pt initially treated with tigan, transitioned to reglan once QTc improved. Abdominal pain likely 2/2 gastroparesis vs poor glycemic control.  - Continue reglan PO q8 as needed  - follow up outpatient endocrinology     #Diarrhea  Presenting with increased ostomy output. Also reporting 1 episode of red bloody output from ostomy bag at home. Started on loperamide. Surgery and ostomy nurse consulted. GI PCR negative.   - Continue loperamide 2mg as needed for diarrhea  - Outpatient surgery followup and optimization for ostomy reversal    #Diabetes.   ·  Plan: Last here in January with change in regimen to humulin 70U TID, admelog 40U TID before meals, metformin 1g qd. A1c last admission 16.4, now downtrending. Noted to have episodes of bleeding in ostomy bag (2x this admission, 1x at home), evaluated by GI and surgery, likely superficial bleed as pt hemodynamically stable this admission and episodes now resolved.  - discontinue metformin and ozempic (c/f gastroparesis, increased ostomy output)  - fingerstick glucose goal is 100-180 mg/dL  - continue Lantus 40u qhs, Lispro 12u pre-meals (3 times/day)  - outpatient follow up w endocrine      Patient was discharged to: Home    New medications:   Changes to old medications:  Medications that were stopped:    Items to follow up as outpatient:    Physical exam at the time of discharge:     PHYSICAL EXAM:  Constitutional: Obese f in NAD, comfortable in bed.  HEENT: NC/AT, PERRLA, EOMI, no conjunctival pallor or scleral icterus, MMM  Neck: Supple, no JVD  Respiratory: CTA B/L. No w/r/r.   Cardiovascular: RRR, normal S1 and S2, no m/r/g.   Gastrointestinal: +BS, soft, nt/nd, parastomal hernia, ostomy pink, no active bleed, firm, small stool in bag  Extremities: wwp; no cyanosis, clubbing or edema.   Vascular: Pulses equal and strong throughout.   Neurological: AAOx3, no CN deficits, strength and sensation intact throughout.   Skin: No gross skin abnormalities or rashes   43F PMH CVA (residual L>R weakness), PE (on Eliquis), multiple abdominal hernia repairs, nec fasc of abdomen s/p corbin procedure w ostomy, uncontrolled DM,  CAD with multiple stents (last in August 2023) presents with n/v, increased diarrheal ostomy output, abdominal pain, and low blood sugar. Initially meeting SIRS criteria with elevated lactate, improved with fluids. Endo consulted on admission for diabetes management, pt home nutritional insulin and lantus was discontinued due to persistent hypoglycemic episodes. Initially requiring dextrose infusion for glucose control, now discontinued. GYN consulted for evaluation of lower abdominal pain and possible GYN etiology of sx. GI consulted, recommended reglan due to c/f gastroparesis. Surgery consulted due to increased ostomy output. Pain initially managed with opioid medications, de-escalated during hospital course. Pt medically ready for discharge.    Problem List:   #Abdominal pain with vomiting  Presenting with lower abdominal pressure like pain, as well as nausea, vomiting, and diarrhea. Pt initially treated with tigan, transitioned to reglan once QTc improved. Abdominal pain likely 2/2 gastroparesis vs poor glycemic control.  - Continue reglan PO q8 as needed  - follow up outpatient endocrinology     #Diarrhea  Presenting with increased ostomy output. Also reporting 1 episode of red bloody output from ostomy bag at home. Started on loperamide. Surgery and ostomy nurse consulted. GI PCR negative.   - Continue loperamide 2mg as needed for diarrhea  - Outpatient surgery followup and optimization for ostomy reversal    #Diabetes.   ·  Plan: Last here in January with change in regimen to humulin 70U TID, admelog 40U TID before meals, metformin 1g qd. A1c last admission 16.4, now downtrending. Noted to have episodes of bleeding in ostomy bag (2x this admission, 1x at home), evaluated by GI and surgery, likely superficial bleed as pt hemodynamically stable this admission and episodes now resolved.  - discontinue metformin and ozempic (c/f gastroparesis, increased ostomy output)  - fingerstick glucose goal is 100-180 mg/dL  - continue Lantus 40u qhs, Lispro 15u pre-meals (3 times/day)  - outpatient follow up w endocrine      Patient was discharged to: Home    New medications:   Changes to old medications:  Medications that were stopped:    Items to follow up as outpatient:    Physical exam at the time of discharge:     PHYSICAL EXAM:  Constitutional: Obese f in NAD, comfortable in bed.  HEENT: NC/AT, PERRLA, EOMI, no conjunctival pallor or scleral icterus, MMM  Neck: Supple, no JVD  Respiratory: CTA B/L. No w/r/r.   Cardiovascular: RRR, normal S1 and S2, no m/r/g.   Gastrointestinal: +BS, soft, nt/nd, parastomal hernia, ostomy pink, no active bleed, firm, small stool in bag  Extremities: wwp; no cyanosis, clubbing or edema.   Vascular: Pulses equal and strong throughout.   Neurological: AAOx3, no CN deficits, strength and sensation intact throughout.   Skin: No gross skin abnormalities or rashes

## 2024-02-14 NOTE — PROGRESS NOTE ADULT - PROBLEM SELECTOR PLAN 3
Suspect that this is in setting of eating less solids and continuing with her home insulin regimen. Holding insulin for now and will reach out to endocrine with help in her regimen.  - started D5LR @70cc/hr given hypoglycemia, now discontinued  - holding nutritional insulin and lantus for now  - c/w mISS  - f/u endo recs  - diet education

## 2024-02-14 NOTE — DISCHARGE NOTE PROVIDER - CARE PROVIDERS DIRECT ADDRESSES
[email or web address],[email or web address] [email or web address],DirectAddress_Unknown,DirectAddress_Unknown

## 2024-02-14 NOTE — PROGRESS NOTE ADULT - PROBLEM SELECTOR PLAN 1
Pressure like pain mostly in lower quadrants associated with nausea, NBNB vomiting, and increased ostomy output. 1 small episode of dark and bright red blood in output at home. Lipase negative, EKG without ischemic changes. CT unremarkable. Although lactate was elevated, it cleared with fluids and patient is comfortable appearing on exam, lower suspicion for acute mesenteric ischemia. DDx include gastroparesis (however pt having diarrhea and not reduced motility) vs chronic mesenteric ischemia. GYN consulted to r/o vaginal prolapse as cause of sx, however low suspcion at this time. GI and surgery consulted.  - reglan standing for nausea (qtc 436)  - tylenol 975mg Q8h standing, ketorolac 15 IVP for moderate pain, oxycodone 2.5mg q6 prn for severe pain  - plan to de-escalate opioid pain meds as likely contributing or worsening GI sx  - f/u pancreatic elastase, calprotectin, and stool electrolytes to calculate osmolar gap   - f/u GYN recs

## 2024-02-14 NOTE — CONSULT NOTE ADULT - ASSESSMENT
Assessment:  43F w/ PMHx Asthma, CVA (11/2021; residual L>R weakness), PE (4/2023 on Eliquis, last taken 12/29), T2DM (on insulin), HTN, HLD with hx abdominal necrotizing fasciitis s/p diverting colostomy creation (11/2021; [facility]), CAD (on , 2 NAT in 6/23) with PSHx of numerous abdominal wall hernia repairs, who presents with increased colostomy output, dec PO intake, nausea and vomiting x2 weeks. Patient with multiple medical complaints, with unclear diagnosis at this time. High ileostomy output could be secondary to drinking primarily liquids, GI viral illness, poor glucose control, etc. Recent bleeding from colostomy likely 2/2 friable skin surrounding stoma however cannot exclude internal source without endoscopy at this time.     Plan:  - No acute surgical intervention at this time  - Continue to optimize glucose control  - ostomy nursing consult for optimization of pouch apparatus (i.e. prevent further leakage and to prevent joey-stomal skin breakdown)  - Strict I/Os, please document colostomy output q4  - Will consult colorectal surgery as well for possible c-scope to r/o internal GI source of recent BRB per the colostomy   - Appreciate GI recs  Team 4c will continue to follow, please call with any questions or concerns  Plan discussed with chief resident and attending, Dr. Hurtado Assessment:  43F w/ PMHx Asthma, CVA (11/2021; residual L>R weakness), PE (4/2023 on Eliquis), T2DM (on insulin), HTN, HLD with hx abdominal necrotizing fasciitis s/p diverting colostomy creation (11/2021; [facility]), CAD (on , 2 NAT in 6/23) with PSHx of numerous abdominal wall hernia repairs, who presents with increased colostomy output, dec PO intake, nausea and vomiting x2 weeks. Patient with multiple medical complaints, with unclear diagnosis at this time. High ileostomy output could be secondary to drinking primarily liquids, GI viral illness, poor glucose control, etc. Recent bleeding from colostomy likely 2/2 friable skin surrounding stoma however cannot exclude internal source without endoscopy at this time.     Plan:  - No acute surgical intervention at this time  - Continue to optimize glucose control  - ostomy nursing consult for optimization of pouch apparatus (i.e. prevent further leakage and to prevent joey-stomal skin breakdown)  - Strict I/Os, please document colostomy output q4  - Will consult colorectal surgery as well for possible c-scope to r/o internal GI source of recent BRB per the colostomy   - Appreciate GI recs  Team 4c will continue to follow, please call with any questions or concerns  Plan discussed with chief resident and attending, Dr. Hurtado Assessment:  43F w/ PMHx Asthma, CVA (11/2021; residual L>R weakness), PE (4/2023 on Eliquis), T2DM (on insulin), HTN, HLD with hx abdominal necrotizing fasciitis s/p diverting colostomy creation (11/2021; [facility]), CAD (on , 2 NAT in 6/23) with PSHx of numerous abdominal wall hernia repairs, who presents with increased colostomy output, dec PO intake, nausea and vomiting x2 weeks. Patient with multiple medical complaints, with unclear diagnosis at this time. High ileostomy output could be secondary to drinking primarily liquids, GI viral illness, poor glucose control, etc. Recent bleeding from colostomy likely 2/2 friable skin surrounding stoma however cannot exclude internal source without endoscopy at this time.     Plan:  - No acute surgical intervention at this time  - Continue to optimize glucose control  - ostomy nursing consult for optimization of pouch apparatus (i.e. prevent further leakage and to prevent joey-stomal skin breakdown)  - Strict I/Os, please document colostomy output q4  - Will consult colorectal surgery as well for evaluation of recent BRB per the colostomy   - Appreciate GI recs, will likely need Upper and lower endoscopy this admission  Team 4c and 5c will continue to follow, please call with any questions or concerns  Plan discussed with chief resident and attending, Dr. Hurtado regarding stoma evaluation  Plan discussed with attending, Dr. Wetzel regarding bloody output from colostomy

## 2024-02-14 NOTE — CONSULT NOTE ADULT - SUBJECTIVE AND OBJECTIVE BOX
HPI:  44 y/o F PMH CVA (residual L>R weakness), PE (on Eliquis), multiple abdominal hernia repairs, nec fasc of abdomen s/o corbin procedure w ostomy, uncontrolled DM,  CAD with multiple stents (last in August 2023) presents with n/v, increased ostomy output, and low blood sugar. About 4-5 days ago she was having nausea and stopped eating as much solids and having mostly liquids. She then noticed increased yellowish watery output from her ostomy 3 days ago, with one episode of dark red blood. There was leaking of her ostomy associated with redness and irritation of around her ostomy site. She had an episode of NBNB vomiting yesterday prompting her to come to the ED. She also had low f/s at home associated with palpitations and clamminess. She has had abd pressure like pain in b/l lower quadrants over the same period of time. She denies any f/c though she felt a bit warm and denies any chest pain, dyspnea. She has noticed dark foul smelling urine but no dysuria. Of note, she was here last month with hyperglycemia when she was started on a new insulin regimen which she has been adherent with.    In the ED:  Initial vital signs: T: 98.8 F, HR: 116-->97, BP: 184/76-->139/70, R: 17, SpO2: 98% on RA  ED course:   Labs: significant for WBC 14.06, 70% neutrophils, plt 518, K 3.0-->3.2, glucose 69-->223-->72, lactate 3.5-->1.9, lipase 26, Mg 1.6, UA negative for pyuria, c diff negative, GI PCR negative  Imaging:  CT AP: No acute findings  CXR: no acute pathology  EKG: sinus tachy to 96, QTc 502`  Medications: dilaudid 1mg IV, dilaudid 0.5mg IV, Magneisum sulfate 1g, zofran 4mg IV, KCL 10meq IV x3, 2L NS, D5 gtt, D5 1/2NS  Consults: MICU consulted for hypokalemia and hypoglycemia as sugar dropped after D5 was stopped, they recommended repletion of K, workup of ostomy output, and continuing with D5 1/2NS and endocrine consult   (11 Feb 2024 09:22)      GENERAL SURGERY ADDENDUM:  43F w/ PMHx Asthma, CVA (11/2021; residual L>R weakness), PE (4/2023 on Eliquis, last taken 12/29), T2DM (on insulin), HTN, HLD with hx abdominal necrotizing fasciitis s/p diverting colostomy creation (11/2021; [facility]), CAD (on , 2 NAT in 6/23) with PSHx of numerous abdominal wall hernia repairs, who presents with increased colostomy output, nausea and vomiting x1 week. In the ED, pt found to be hypoglycemic (glucose 66), hypokalemic (3.0), and met SIRS criteria w/ lactate of 3 on admission, now resolved. General surgery consulted for management of high ileostomy output and for stoma evaluation.     Patient reports that over the last 2 weeks she has been experiencing decreased PO intake and nausea, accompanied by increased colostomy output. She reports only drinking liquids during this time. She reports changing her colostomy bag 6-7x a day for the last week as it has been filling up very quickly with liquid output as well as leaking frequently. She reports that a little over a week ago, the colostomy bag filled with blood and stopped by itself, and then the diarrhea began 2 days after. She denied any fevers or chills, sob or cp, lightheadedness or dizziness.     Currently, VS wnl, with exception of intermittent tachycardia to low 100s. On exam, abdomen is soft, mildly ttp throughout. Colostomy p/p/p with minimal output and gas (was recently changed) with friable skin surrounding the stoma, with small area of skin bleeding just inferior to the stoma. There is a large parastomal hernia surrounding as well. Labs notable for WBC 13, Hgb 11, a1c 10.8. BCx negative. GI PCR negative. C diff neg. CT a/p on admission showing no acute abdominopelvic findings. Again status post Corbin's procedure with left lower quadrant colostomy. No bowel obstruction.    Medications:  acetaminophen     Tablet .. 975 milliGRAM(s) Oral every 8 hours  apixaban 5 milliGRAM(s) Oral every 12 hours  atorvastatin 80 milliGRAM(s) Oral at bedtime  clopidogrel Tablet 75 milliGRAM(s) Oral daily  dextrose 5%. 1000 milliLiter(s) IV Continuous <Continuous>  dextrose 50% Injectable 25 Gram(s) IV Push once  dextrose Oral Gel 15 Gram(s) Oral once PRN  gabapentin 600 milliGRAM(s) Oral every 8 hours  glucagon  Injectable 1 milliGRAM(s) IntraMuscular once  influenza   Vaccine 0.5 milliLiter(s) IntraMuscular once  insulin lispro (ADMELOG) corrective regimen sliding scale   SubCutaneous three times a day before meals  insulin lispro (ADMELOG) corrective regimen sliding scale   SubCutaneous at bedtime  ketorolac   Injectable 15 milliGRAM(s) IV Push every 8 hours PRN  lactobacillus acidophilus 1 Tablet(s) Oral daily  lidocaine   4% Patch 1 Patch Transdermal every 24 hours  loperamide 2 milliGRAM(s) Oral every 6 hours PRN  metoclopramide 5 milliGRAM(s) Oral every 8 hours  metoprolol succinate ER 50 milliGRAM(s) Oral daily  nystatin Powder 1 Application(s) Topical two times a day  oxyCODONE    IR 5 milliGRAM(s) Oral every 6 hours PRN  oxyCODONE    IR 2.5 milliGRAM(s) Oral every 6 hours PRN  pantoprazole    Tablet 40 milliGRAM(s) Oral two times a day      Allergies    penicillin (Hives)  vancomycin (Anaphylaxis; Hives)  shellfish. (Hives; Anaphylaxis)  clindamycin (Pruritus)  fish (Hives; Urticaria)  iodine containing compounds (Hives; Anaphylaxis)  amoxicillin (Short breath; Rash)    Intolerances    Bactrim I.V. (Rash (Mod to Severe))      ICU Vital Signs Last 24 Hrs  T(F): 97.8 (02-14-24 @ 08:40), Max: 98.8 (02-14-24 @ 05:57)  HR: 94 (02-14-24 @ 08:40) (94 - 111)  BP: 144/84 (02-14-24 @ 08:40) (102/76 - 144/84)  BP(mean): 84 (02-14-24 @ 05:57) (84 - 84)  ABP: --  RR: 20 (02-14-24 @ 08:40) (17 - 20)  SpO2: 97% (02-14-24 @ 08:40) (92% - 97%)    General: AAOx3, NAD, WDWN, laying comfortably in bed  Cardio: S1,S2, No MRG, RRR  Pulm: Lungs bilaterally clear to auscultation  Abdomen: soft, mildly ttp throughout. Colostomy p/p/p with minimal output and gas (was recently changed) with friable skin surrounding the stoma, with small area of skin bleeding just inferior to the stoma. There is a large parastomal hernia surrounding as well.   Extremities: WWP, peripheral pulses appreciated    LABS:    02-14    136  |  101  |  16  ----------------------------<  64<L>  4.4   |  22  |  0.81    Ca    9.1      14 Feb 2024 07:54  Phos  3.5     02-14  Mg     1.7     02-14                          11.0   13.40 )-----------( 482      ( 14 Feb 2024 07:54 )             39.9     Urinalysis Basic - ( 14 Feb 2024 07:54 )    Color: x / Appearance: x / SG: x / pH: x  Gluc: 64 mg/dL / Ketone: x  / Bili: x / Urobili: x   Blood: x / Protein: x / Nitrite: x   Leuk Esterase: x / RBC: x / WBC x   Sq Epi: x / Non Sq Epi: x / Bacteria: x    CAPILLARY BLOOD GLUCOSE      POCT Blood Glucose.: 97 mg/dL (14 Feb 2024 10:15)  POCT Blood Glucose.: 69 mg/dL (14 Feb 2024 09:26)  POCT Blood Glucose.: 88 mg/dL (14 Feb 2024 06:37)  POCT Blood Glucose.: 80 mg/dL (14 Feb 2024 04:16)  POCT Blood Glucose.: 54 mg/dL (14 Feb 2024 03:52)  POCT Blood Glucose.: 181 mg/dL (13 Feb 2024 22:06)  POCT Blood Glucose.: 192 mg/dL (13 Feb 2024 19:20)  POCT Blood Glucose.: 128 mg/dL (13 Feb 2024 17:05)    ACC: [id number] EXAM: CT ABDOMEN AND PELVIS ORDERED BY: ANGEL JOHNSON    PROCEDURE DATE: 02/11/2024        INTERPRETATION: CLINICAL INFORMATION: Diffuse abdominal pain.    COMPARISON: CT abdomen/pelvis 12/31/2023.    CONTRAST/COMPLICATIONS:  IV Contrast: None  Oral Contrast: None  Complications: None reported at time of study completion    PROCEDURE:  CT of the Abdomen and Pelvis was performed.  Sagittal and coronal reformats were performed.    FINDINGS:  LOWER CHEST: Within normal limits.    LIVER: Within normal limits.  BILE DUCTS: Normal caliber.  GALLBLADDER: Within normal limits.  SPLEEN: Within normal limits.  PANCREAS: Within normal limits.  ADRENALS: Unchanged 1.2 cm left adrenal nodule, likely adrenal cortical adenoma. Unremarkable right adrenal gland.  KIDNEYS/URETERS: Punctate right nonobstructing calculus versus vascular calcification. No hydronephrosis.    BLADDER: Within normal limits.  REPRODUCTIVE ORGANS: Uterus and adnexa within normal limits.    BOWEL: Again status post Corbin's procedure with left lower quadrant colostomy. Unchanged fat-containing parastomal hernia. No bowel obstruction. Appendix is normal.  PERITONEUM: No ascites.  VESSELS: Within normal limits. Retroaortic left renal vein.  RETROPERITONEUM/LYMPH NODES: No lymphadenopathy.  ABDOMINAL WALL: Right medial gluteal postsurgical change.  BONES: Within normal limits. Loss of disc space with bony ankylosis at L1-2.    IMPRESSION:  1. No acute abdominopelvic findings.  2. Again status post Corbin's procedure with left lower quadrant colostomy. No bowel obstruction.        --- End of Report ---          HEIDY BARRIOS MD; Resident Radiologist  This document has been electronically signed.  MATTHIAS DELGADO MD; Attending Radiologist  This document has been electronically signed. Feb 11 2024 2:21AM   HPI:  44 y/o F PMH CVA (residual L>R weakness), PE (on Eliquis), multiple abdominal hernia repairs, nec fasc of abdomen s/o corbin procedure w ostomy, uncontrolled DM,  CAD with multiple stents (last in 2023) presents with n/v, increased ostomy output, and low blood sugar. About 4-5 days ago she was having nausea and stopped eating as much solids and having mostly liquids. She then noticed increased yellowish watery output from her ostomy 3 days ago, with one episode of dark red blood. There was leaking of her ostomy associated with redness and irritation of around her ostomy site. She had an episode of NBNB vomiting yesterday prompting her to come to the ED. She also had low f/s at home associated with palpitations and clamminess. She has had abd pressure like pain in b/l lower quadrants over the same period of time. She denies any f/c though she felt a bit warm and denies any chest pain, dyspnea. She has noticed dark foul smelling urine but no dysuria. Of note, she was here last month with hyperglycemia when she was started on a new insulin regimen which she has been adherent with.    In the ED:  Initial vital signs: T: 98.8 F, HR: 116-->97, BP: 184/76-->139/70, R: 17, SpO2: 98% on RA  ED course:   Labs: significant for WBC 14.06, 70% neutrophils, plt 518, K 3.0-->3.2, glucose 69-->223-->72, lactate 3.5-->1.9, lipase 26, Mg 1.6, UA negative for pyuria, c diff negative, GI PCR negative  Imaging:  CT AP: No acute findings  CXR: no acute pathology  EKG: sinus tachy to 96, QTc 502`  Medications: dilaudid 1mg IV, dilaudid 0.5mg IV, Magneisum sulfate 1g, zofran 4mg IV, KCL 10meq IV x3, 2L NS, D5 gtt, D5 1/2NS  Consults: MICU consulted for hypokalemia and hypoglycemia as sugar dropped after D5 was stopped, they recommended repletion of K, workup of ostomy output, and continuing with D5 1/2NS and endocrine consult   (2024 09:22)      GENERAL SURGERY ADDENDUM:  43F w/ PMHx Asthma, CVA (2021; residual L>R weakness), PE (2023 on Eliquis), T2DM (on insulin), HTN, HLD with hx abdominal necrotizing fasciitis s/p diverting colostomy creation (2021; [facility]), CAD (on , 2 NAT in ) with PSHx of numerous abdominal wall hernia repairs, who presents with increased colostomy output, nausea and vomiting x1 week. In the ED, pt found to be hypoglycemic (glucose 66), hypokalemic (3.0), and met SIRS criteria w/ lactate of 3 on admission, now resolved. General surgery consulted for management of high ileostomy output and for stoma evaluation.     Patient reports that over the last 2 weeks she has been experiencing decreased PO intake and nausea, accompanied by increased colostomy output. She reports only drinking liquids during this time. She reports changing her colostomy bag 6-7x a day for the last week as it has been filling up very quickly with liquid output as well as leaking frequently. She reports that a little over a week ago, the colostomy bag filled with blood and stopped by itself, and then the diarrhea began 2 days after. She denied any fevers or chills, sob or cp, lightheadedness or dizziness.     Currently, VS wnl, with exception of intermittent tachycardia to low 100s. On exam, abdomen is soft, mildly ttp throughout. Colostomy p/p/p with minimal output and gas (was recently changed) with friable skin surrounding the stoma, with small area of skin bleeding just inferior to the stoma. There is a large parastomal hernia surrounding as well. Labs notable for WBC 13, Hgb 11, a1c 10.8. BCx negative. GI PCR negative. C diff neg. CT a/p on admission showing no acute abdominopelvic findings. Again status post Corbin's procedure with left lower quadrant colostomy. No bowel obstruction.    Medical History: asthma, CVA, PE, IDDM, HTN, HLD, CAD  Surgical History: exploratory laparotomy, LEEP, ventral hernia repair, , ostomy and suprapubic catheter, D&C  Medications: Atrovastatin, , Eliquis BID, insulin, motrin, benadryl, gabepentin, plavix   Allergies: as per chart  Social History: Denies tobacco, alcohol or drug use.  Last c-scope:  (prior to colostomy); wnl    Medications:  acetaminophen     Tablet .. 975 milliGRAM(s) Oral every 8 hours  apixaban 5 milliGRAM(s) Oral every 12 hours  atorvastatin 80 milliGRAM(s) Oral at bedtime  clopidogrel Tablet 75 milliGRAM(s) Oral daily  dextrose 5%. 1000 milliLiter(s) IV Continuous <Continuous>  dextrose 50% Injectable 25 Gram(s) IV Push once  dextrose Oral Gel 15 Gram(s) Oral once PRN  gabapentin 600 milliGRAM(s) Oral every 8 hours  glucagon  Injectable 1 milliGRAM(s) IntraMuscular once  influenza   Vaccine 0.5 milliLiter(s) IntraMuscular once  insulin lispro (ADMELOG) corrective regimen sliding scale   SubCutaneous three times a day before meals  insulin lispro (ADMELOG) corrective regimen sliding scale   SubCutaneous at bedtime  ketorolac   Injectable 15 milliGRAM(s) IV Push every 8 hours PRN  lactobacillus acidophilus 1 Tablet(s) Oral daily  lidocaine   4% Patch 1 Patch Transdermal every 24 hours  loperamide 2 milliGRAM(s) Oral every 6 hours PRN  metoclopramide 5 milliGRAM(s) Oral every 8 hours  metoprolol succinate ER 50 milliGRAM(s) Oral daily  nystatin Powder 1 Application(s) Topical two times a day  oxyCODONE    IR 5 milliGRAM(s) Oral every 6 hours PRN  oxyCODONE    IR 2.5 milliGRAM(s) Oral every 6 hours PRN  pantoprazole    Tablet 40 milliGRAM(s) Oral two times a day      Allergies    penicillin (Hives)  vancomycin (Anaphylaxis; Hives)  shellfish. (Hives; Anaphylaxis)  clindamycin (Pruritus)  fish (Hives; Urticaria)  iodine containing compounds (Hives; Anaphylaxis)  amoxicillin (Short breath; Rash)    Intolerances    Bactrim I.V. (Rash (Mod to Severe))      ICU Vital Signs Last 24 Hrs  T(F): 97.8 (24 @ 08:40), Max: 98.8 (24 @ 05:57)  HR: 94 (24 @ 08:40) (94 - 111)  BP: 144/84 (24 @ 08:40) (102/76 - 144/84)  BP(mean): 84 (24 @ 05:57) (84 - 84)  ABP: --  RR: 20 (24 @ 08:40) (17 - 20)  SpO2: 97% (24 @ 08:40) (92% - 97%)    General: AAOx3, NAD, WDWN, laying comfortably in bed  Cardio: S1,S2, No MRG, RRR  Pulm: Lungs bilaterally clear to auscultation  Abdomen: soft, mildly ttp throughout. Colostomy p/p/p with minimal output and gas (was recently changed) with friable skin surrounding the stoma, with small area of skin bleeding just inferior to the stoma. There is a large parastomal hernia surrounding as well.   Extremities: WWP, peripheral pulses appreciated    LABS:        136  |  101  |  16  ----------------------------<  64<L>  4.4   |  22  |  0.81    Ca    9.1      2024 07:54  Phos  3.5     -  Mg     1.7                               11.0   13.40 )-----------( 482      ( 2024 07:54 )             39.9     Urinalysis Basic - ( 2024 07:54 )    Color: x / Appearance: x / SG: x / pH: x  Gluc: 64 mg/dL / Ketone: x  / Bili: x / Urobili: x   Blood: x / Protein: x / Nitrite: x   Leuk Esterase: x / RBC: x / WBC x   Sq Epi: x / Non Sq Epi: x / Bacteria: x    CAPILLARY BLOOD GLUCOSE      POCT Blood Glucose.: 97 mg/dL (2024 10:15)  POCT Blood Glucose.: 69 mg/dL (2024 09:26)  POCT Blood Glucose.: 88 mg/dL (2024 06:37)  POCT Blood Glucose.: 80 mg/dL (2024 04:16)  POCT Blood Glucose.: 54 mg/dL (2024 03:52)  POCT Blood Glucose.: 181 mg/dL (2024 22:06)  POCT Blood Glucose.: 192 mg/dL (2024 19:20)  POCT Blood Glucose.: 128 mg/dL (2024 17:05)    ACC: [id number] EXAM: CT ABDOMEN AND PELVIS ORDERED BY: ANGEL JOHNSON    PROCEDURE DATE: 2024        INTERPRETATION: CLINICAL INFORMATION: Diffuse abdominal pain.    COMPARISON: CT abdomen/pelvis 2023.    CONTRAST/COMPLICATIONS:  IV Contrast: None  Oral Contrast: None  Complications: None reported at time of study completion    PROCEDURE:  CT of the Abdomen and Pelvis was performed.  Sagittal and coronal reformats were performed.    FINDINGS:  LOWER CHEST: Within normal limits.    LIVER: Within normal limits.  BILE DUCTS: Normal caliber.  GALLBLADDER: Within normal limits.  SPLEEN: Within normal limits.  PANCREAS: Within normal limits.  ADRENALS: Unchanged 1.2 cm left adrenal nodule, likely adrenal cortical adenoma. Unremarkable right adrenal gland.  KIDNEYS/URETERS: Punctate right nonobstructing calculus versus vascular calcification. No hydronephrosis.    BLADDER: Within normal limits.  REPRODUCTIVE ORGANS: Uterus and adnexa within normal limits.    BOWEL: Again status post Corbin's procedure with left lower quadrant colostomy. Unchanged fat-containing parastomal hernia. No bowel obstruction. Appendix is normal.  PERITONEUM: No ascites.  VESSELS: Within normal limits. Retroaortic left renal vein.  RETROPERITONEUM/LYMPH NODES: No lymphadenopathy.  ABDOMINAL WALL: Right medial gluteal postsurgical change.  BONES: Within normal limits. Loss of disc space with bony ankylosis at L1-2.    IMPRESSION:  1. No acute abdominopelvic findings.  2. Again status post Corbin's procedure with left lower quadrant colostomy. No bowel obstruction.        --- End of Report ---          HEIDY BARRIOS MD; Resident Radiologist  This document has been electronically signed.  MATTHIAS DELGADO MD; Attending Radiologist  This document has been electronically signed. 2024 2:21AM

## 2024-02-14 NOTE — DISCHARGE NOTE PROVIDER - ATTENDING DISCHARGE PHYSICAL EXAMINATION:
Patient is refusing to be examined: states she is ''very pissed'' she has been here 5 days and unsatisfied with her care. Pt was advised if she changes her mind im happy to return and examine her   output has improved  patient continues to have abdominal pain - nausea improved with reglan    pt needs outpt pain management fu with Dr. Rosario Zapata- [phone]  uncontrolled dm 2: a1c > 10 improved from last month -- fu final endo recs for discharge today   MR for discharge as surgery does not plan for acute intervention - pt will receive dc notice

## 2024-02-14 NOTE — PROGRESS NOTE ADULT - ASSESSMENT
43y Female with type 2 insulin-requiring DM, markedly insulin-resistant on U-500 insulin, admitted with hypoglycemia secondary to N/V for the past 24 hours.    Continues to have episodes of hypoglycemia per CGM on much less insulin than she takes at home, likely from long half life of U-500 v gastroparesis v insulin binding antibodies v reactive hypoglycemia (continues to have good amount of endogenous insulin, c peptide 9.7)    A1C: 10.8 % (16.4 % six weeks ago)  C-peptide 9.7 (May 2023)  BUN: 16  Creatinine: 0.81  GFR: 92  Weight: 101.2  BMI: 34.9

## 2024-02-14 NOTE — PROGRESS NOTE ADULT - SUBJECTIVE AND OBJECTIVE BOX
GI Consult Progress Note:     OVERNIGHT EVENTS:    SUBJECTIVE / INTERVAL HPI: Patient seen and examined at bedside.     VITAL SIGNS:  Vital Signs Last 24 Hrs  T(C): 36.6 (14 Feb 2024 08:40), Max: 37.1 (14 Feb 2024 05:57)  T(F): 97.8 (14 Feb 2024 08:40), Max: 98.8 (14 Feb 2024 05:57)  HR: 94 (14 Feb 2024 08:40) (94 - 111)  BP: 144/84 (14 Feb 2024 08:40) (102/76 - 144/84)  BP(mean): 84 (14 Feb 2024 05:57) (84 - 84)  RR: 20 (14 Feb 2024 08:40) (17 - 20)  SpO2: 97% (14 Feb 2024 08:40) (92% - 97%)    Parameters below as of 14 Feb 2024 08:40  Patient On (Oxygen Delivery Method): room air        02-13-24 @ 07:01  -  02-14-24 @ 07:00  --------------------------------------------------------  IN: 690 mL / OUT: 3250 mL / NET: -2560 mL    02-14-24 @ 07:01  - 02-14-24 @ 11:25  --------------------------------------------------------  IN: 300 mL / OUT: 400 mL / NET: -100 mL        PHYSICAL EXAM:    General: WDWN  HEENT: NC/AT; PERRL, anicteric sclera; MMM  Neck: supple  Cardiovascular: +S1/S2; RRR  Respiratory: CTA B/L; no W/R/R  Gastrointestinal: soft, NT/ND; +BSx4  Extremities: WWP; no edema, clubbing or cyanosis  Vascular: 2+ radial, DP/PT pulses B/L  Neurological: AAOx3; no focal deficits    MEDICATIONS:  MEDICATIONS  (STANDING):  acetaminophen     Tablet .. 975 milliGRAM(s) Oral every 8 hours  apixaban 5 milliGRAM(s) Oral every 12 hours  atorvastatin 80 milliGRAM(s) Oral at bedtime  clopidogrel Tablet 75 milliGRAM(s) Oral daily  dextrose 5%. 1000 milliLiter(s) (50 mL/Hr) IV Continuous <Continuous>  dextrose 50% Injectable 25 Gram(s) IV Push once  gabapentin 600 milliGRAM(s) Oral every 8 hours  glucagon  Injectable 1 milliGRAM(s) IntraMuscular once  influenza   Vaccine 0.5 milliLiter(s) IntraMuscular once  insulin lispro (ADMELOG) corrective regimen sliding scale   SubCutaneous three times a day before meals  insulin lispro (ADMELOG) corrective regimen sliding scale   SubCutaneous at bedtime  lactobacillus acidophilus 1 Tablet(s) Oral daily  lidocaine   4% Patch 1 Patch Transdermal every 24 hours  metoclopramide 5 milliGRAM(s) Oral every 8 hours  metoprolol succinate ER 50 milliGRAM(s) Oral daily  nystatin Powder 1 Application(s) Topical two times a day  pantoprazole    Tablet 40 milliGRAM(s) Oral two times a day    MEDICATIONS  (PRN):  dextrose Oral Gel 15 Gram(s) Oral once PRN Blood Glucose LESS THAN 70 milliGRAM(s)/deciliter  ketorolac   Injectable 15 milliGRAM(s) IV Push every 8 hours PRN Moderate Pain (4 - 6)  loperamide 2 milliGRAM(s) Oral every 6 hours PRN Diarrhea  oxyCODONE    IR 2.5 milliGRAM(s) Oral every 6 hours PRN Moderate Pain (4 - 6)  oxyCODONE    IR 5 milliGRAM(s) Oral every 6 hours PRN Severe Pain (7 - 10)      ALLERGIES:  Allergies    penicillin (Hives)  vancomycin (Anaphylaxis; Hives)  shellfish. (Hives; Anaphylaxis)  clindamycin (Pruritus)  fish (Hives; Urticaria)  iodine containing compounds (Hives; Anaphylaxis)  amoxicillin (Short breath; Rash)    Intolerances    Bactrim I.V. (Rash (Mod to Severe))      LABS:                        11.0   13.40 )-----------( 482      ( 14 Feb 2024 07:54 )             39.9     02-14    136  |  101  |  16  ----------------------------<  64<L>  4.4   |  22  |  0.81    Ca    9.1      14 Feb 2024 07:54  Phos  3.5     02-14  Mg     1.7     02-14        Urinalysis Basic - ( 14 Feb 2024 07:54 )    Color: x / Appearance: x / SG: x / pH: x  Gluc: 64 mg/dL / Ketone: x  / Bili: x / Urobili: x   Blood: x / Protein: x / Nitrite: x   Leuk Esterase: x / RBC: x / WBC x   Sq Epi: x / Non Sq Epi: x / Bacteria: x      CAPILLARY BLOOD GLUCOSE      POCT Blood Glucose.: 97 mg/dL (14 Feb 2024 10:15)        RADIOLOGY & ADDITIONAL TESTS: Reviewed.    ASSESSMENT:    PLAN:    GI Consult Progress Note:     OVERNIGHT EVENTS: FAUSTINO.    SUBJECTIVE / INTERVAL HPI:   Patient seen and examined at bedside. States that overall she feels slightly better from the perspective of nausea and vomiting after Tigan and Reglan. Still having a significant amount of out-put from ostomy bag, which she feels is now leaking due to the increased out-put. Denies any abdominal pain, diarrhea, fever, melena, hematochezia. or hematemesis.         VITAL SIGNS:  Vital Signs Last 24 Hrs  T(C): 36.6 (14 Feb 2024 08:40), Max: 37.1 (14 Feb 2024 05:57)  T(F): 97.8 (14 Feb 2024 08:40), Max: 98.8 (14 Feb 2024 05:57)  HR: 94 (14 Feb 2024 08:40) (94 - 111)  BP: 144/84 (14 Feb 2024 08:40) (102/76 - 144/84)  BP(mean): 84 (14 Feb 2024 05:57) (84 - 84)  RR: 20 (14 Feb 2024 08:40) (17 - 20)  SpO2: 97% (14 Feb 2024 08:40) (92% - 97%)    Parameters below as of 14 Feb 2024 08:40  Patient On (Oxygen Delivery Method): room air        02-13-24 @ 07:01  -  02-14-24 @ 07:00  --------------------------------------------------------  IN: 690 mL / OUT: 3250 mL / NET: -2560 mL    02-14-24 @ 07:01  -  02-14-24 @ 11:25  --------------------------------------------------------  IN: 300 mL / OUT: 400 mL / NET: -100 mL      PHYSICAL EXAM:  General: No acute distress, obese   Lungs: Normal respiratory effort and no intercostal retractions  Cardiovascular: RRR  Abdomen: Soft, non-tender, non-distended, +ostomy bag with clear contents   Neurological: Alert and oriented x3  Skin: Warm and dry. No obvious rash       MEDICATIONS:  MEDICATIONS  (STANDING):  acetaminophen     Tablet .. 975 milliGRAM(s) Oral every 8 hours  apixaban 5 milliGRAM(s) Oral every 12 hours  atorvastatin 80 milliGRAM(s) Oral at bedtime  clopidogrel Tablet 75 milliGRAM(s) Oral daily  dextrose 5%. 1000 milliLiter(s) (50 mL/Hr) IV Continuous <Continuous>  dextrose 50% Injectable 25 Gram(s) IV Push once  gabapentin 600 milliGRAM(s) Oral every 8 hours  glucagon  Injectable 1 milliGRAM(s) IntraMuscular once  influenza   Vaccine 0.5 milliLiter(s) IntraMuscular once  insulin lispro (ADMELOG) corrective regimen sliding scale   SubCutaneous three times a day before meals  insulin lispro (ADMELOG) corrective regimen sliding scale   SubCutaneous at bedtime  lactobacillus acidophilus 1 Tablet(s) Oral daily  lidocaine   4% Patch 1 Patch Transdermal every 24 hours  metoclopramide 5 milliGRAM(s) Oral every 8 hours  metoprolol succinate ER 50 milliGRAM(s) Oral daily  nystatin Powder 1 Application(s) Topical two times a day  pantoprazole    Tablet 40 milliGRAM(s) Oral two times a day    MEDICATIONS  (PRN):  dextrose Oral Gel 15 Gram(s) Oral once PRN Blood Glucose LESS THAN 70 milliGRAM(s)/deciliter  ketorolac   Injectable 15 milliGRAM(s) IV Push every 8 hours PRN Moderate Pain (4 - 6)  loperamide 2 milliGRAM(s) Oral every 6 hours PRN Diarrhea  oxyCODONE    IR 2.5 milliGRAM(s) Oral every 6 hours PRN Moderate Pain (4 - 6)  oxyCODONE    IR 5 milliGRAM(s) Oral every 6 hours PRN Severe Pain (7 - 10)      ALLERGIES:  Allergies    penicillin (Hives)  vancomycin (Anaphylaxis; Hives)  shellfish. (Hives; Anaphylaxis)  clindamycin (Pruritus)  fish (Hives; Urticaria)  iodine containing compounds (Hives; Anaphylaxis)  amoxicillin (Short breath; Rash)    Intolerances    Bactrim I.V. (Rash (Mod to Severe))      LABS:                        11.0   13.40 )-----------( 482      ( 14 Feb 2024 07:54 )             39.9     02-14    136  |  101  |  16  ----------------------------<  64<L>  4.4   |  22  |  0.81    Ca    9.1      14 Feb 2024 07:54  Phos  3.5     02-14  Mg     1.7     02-14        Urinalysis Basic - ( 14 Feb 2024 07:54 )    Color: x / Appearance: x / SG: x / pH: x  Gluc: 64 mg/dL / Ketone: x  / Bili: x / Urobili: x   Blood: x / Protein: x / Nitrite: x   Leuk Esterase: x / RBC: x / WBC x   Sq Epi: x / Non Sq Epi: x / Bacteria: x      CAPILLARY BLOOD GLUCOSE      POCT Blood Glucose.: 97 mg/dL (14 Feb 2024 10:15)  RADIOLOGY & ADDITIONAL TESTS: Reviewed.       GI Consult Progress Note:     OVERNIGHT EVENTS: FAUSTINO.    SUBJECTIVE / INTERVAL HPI:   Patient seen and examined at bedside. States that overall she feels slightly better from the perspective of nausea and vomiting after Tigan and Reglan. Still having a significant amount of out-put from ostomy bag, which she feels is now leaking due to the increased out-put. Denies any abdominal pain, diarrhea, fever, melena, hematochezia. or hematemesis.       VITAL SIGNS:  Vital Signs Last 24 Hrs  T(C): 36.6 (14 Feb 2024 08:40), Max: 37.1 (14 Feb 2024 05:57)  T(F): 97.8 (14 Feb 2024 08:40), Max: 98.8 (14 Feb 2024 05:57)  HR: 94 (14 Feb 2024 08:40) (94 - 111)  BP: 144/84 (14 Feb 2024 08:40) (102/76 - 144/84)  BP(mean): 84 (14 Feb 2024 05:57) (84 - 84)  RR: 20 (14 Feb 2024 08:40) (17 - 20)  SpO2: 97% (14 Feb 2024 08:40) (92% - 97%)    Parameters below as of 14 Feb 2024 08:40  Patient On (Oxygen Delivery Method): room air        02-13-24 @ 07:01  -  02-14-24 @ 07:00  --------------------------------------------------------  IN: 690 mL / OUT: 3250 mL / NET: -2560 mL    02-14-24 @ 07:01  -  02-14-24 @ 11:25  --------------------------------------------------------  IN: 300 mL / OUT: 400 mL / NET: -100 mL      PHYSICAL EXAM:  General: No acute distress, obese   Lungs: Normal respiratory effort and no intercostal retractions  Cardiovascular: RRR  Abdomen: Soft, non-tender, non-distended, +ostomy bag with clear contents   Neurological: Alert and oriented x3  Skin: Warm and dry. No obvious rash       MEDICATIONS:  MEDICATIONS  (STANDING):  acetaminophen     Tablet .. 975 milliGRAM(s) Oral every 8 hours  apixaban 5 milliGRAM(s) Oral every 12 hours  atorvastatin 80 milliGRAM(s) Oral at bedtime  clopidogrel Tablet 75 milliGRAM(s) Oral daily  dextrose 5%. 1000 milliLiter(s) (50 mL/Hr) IV Continuous <Continuous>  dextrose 50% Injectable 25 Gram(s) IV Push once  gabapentin 600 milliGRAM(s) Oral every 8 hours  glucagon  Injectable 1 milliGRAM(s) IntraMuscular once  influenza   Vaccine 0.5 milliLiter(s) IntraMuscular once  insulin lispro (ADMELOG) corrective regimen sliding scale   SubCutaneous three times a day before meals  insulin lispro (ADMELOG) corrective regimen sliding scale   SubCutaneous at bedtime  lactobacillus acidophilus 1 Tablet(s) Oral daily  lidocaine   4% Patch 1 Patch Transdermal every 24 hours  metoclopramide 5 milliGRAM(s) Oral every 8 hours  metoprolol succinate ER 50 milliGRAM(s) Oral daily  nystatin Powder 1 Application(s) Topical two times a day  pantoprazole    Tablet 40 milliGRAM(s) Oral two times a day    MEDICATIONS  (PRN):  dextrose Oral Gel 15 Gram(s) Oral once PRN Blood Glucose LESS THAN 70 milliGRAM(s)/deciliter  ketorolac   Injectable 15 milliGRAM(s) IV Push every 8 hours PRN Moderate Pain (4 - 6)  loperamide 2 milliGRAM(s) Oral every 6 hours PRN Diarrhea  oxyCODONE    IR 2.5 milliGRAM(s) Oral every 6 hours PRN Moderate Pain (4 - 6)  oxyCODONE    IR 5 milliGRAM(s) Oral every 6 hours PRN Severe Pain (7 - 10)      ALLERGIES:  Allergies    penicillin (Hives)  vancomycin (Anaphylaxis; Hives)  shellfish. (Hives; Anaphylaxis)  clindamycin (Pruritus)  fish (Hives; Urticaria)  iodine containing compounds (Hives; Anaphylaxis)  amoxicillin (Short breath; Rash)    Intolerances    Bactrim I.V. (Rash (Mod to Severe))      LABS:                        11.0   13.40 )-----------( 482      ( 14 Feb 2024 07:54 )             39.9     02-14    136  |  101  |  16  ----------------------------<  64<L>  4.4   |  22  |  0.81    Ca    9.1      14 Feb 2024 07:54  Phos  3.5     02-14  Mg     1.7     02-14        Urinalysis Basic - ( 14 Feb 2024 07:54 )    Color: x / Appearance: x / SG: x / pH: x  Gluc: 64 mg/dL / Ketone: x  / Bili: x / Urobili: x   Blood: x / Protein: x / Nitrite: x   Leuk Esterase: x / RBC: x / WBC x   Sq Epi: x / Non Sq Epi: x / Bacteria: x      CAPILLARY BLOOD GLUCOSE      POCT Blood Glucose.: 97 mg/dL (14 Feb 2024 10:15)  RADIOLOGY & ADDITIONAL TESTS: Reviewed.

## 2024-02-14 NOTE — DISCHARGE NOTE PROVIDER - PROVIDER TOKENS
PROVIDER:[TOKEN:[[id number]],FOLLOWUP:[2 weeks],ESTABLISHEDPATIENT:[T]] PROVIDER:[TOKEN:[[id number]],FOLLOWUP:[2 weeks],ESTABLISHEDPATIENT:[T]],PROVIDER:[TOKEN:[[id number]],FOLLOWUP:[1 month],ESTABLISHEDPATIENT:[T]] PROVIDER:[TOKEN:[[id number]],FOLLOWUP:[2 weeks],ESTABLISHEDPATIENT:[T]],PROVIDER:[TOKEN:[[id number]],FOLLOWUP:[1 month],ESTABLISHEDPATIENT:[T]],PROVIDER:[TOKEN:[[id number]]]

## 2024-02-14 NOTE — PROVIDER CONTACT NOTE (HYPOGLYCEMIA EVENT) - NS PROVIDER CONTACT RECOMMEND-HYPO
Medicine team MD Kathy Stringer was contacted about patient's glucose levels on shift. Recommend Endocrinology involved with patient's case. Recommend fingersticks pre and post meal

## 2024-02-14 NOTE — DISCHARGE NOTE PROVIDER - NSDCMRMEDTOKEN_GEN_ALL_CORE_FT
Admelog 100 units/mL injectable solution: 40 unit(s) injectable 3 times a day (before meals)  apixaban 5 mg oral tablet: 1 tab(s) orally every 12 hours  atorvastatin 80 mg oral tablet: 1 tab(s) orally once a day (at bedtime)  clopidogrel 75 mg oral tablet: 1 tab(s) orally once a day  gabapentin 600 mg oral tablet: 1 tab(s) orally 3 times a day  HumuLIN R (Concentrated) 500 units/mL subcutaneous solution: 70 unit(s) subcutaneous 3 times a day  lactobacillus acidophilus oral capsule: 1 cap(s) orally once a day  metFORMIN 500 mg oral tablet, extended release: 2 tab(s) orally once a day  metoprolol succinate 50 mg oral tablet, extended release: 1 tab(s) orally once a day  nystatin 100,000 units/g topical powder: Apply topically to affected area 2 times a day  oxyCODONE 5 mg oral tablet: 1 tab(s) orally every 6 hours as needed for  severe pain MDD: 4 tabs  pantoprazole 40 mg oral delayed release tablet: 1 tab(s) orally once a day (before a meal)  semaglutide 0.25 mg/0.5 mL (0.25 mg dose) subcutaneous solution: 0.25 milligram(s) subcutaneously once a week Inject 0.25mg once weekly for 4 weeks, and then increase to 0.5mg weekly if tolerating.   Admelog 100 units/mL injectable solution: 40 unit(s) injectable 3 times a day (before meals)  apixaban 5 mg oral tablet: 1 tab(s) orally every 12 hours  atorvastatin 80 mg oral tablet: 1 tab(s) orally once a day (at bedtime)  clopidogrel 75 mg oral tablet: 1 tab(s) orally once a day  gabapentin 600 mg oral tablet: 1 tab(s) orally 3 times a day  HumuLIN R (Concentrated) 500 units/mL subcutaneous solution: 70 unit(s) subcutaneous 3 times a day  lactobacillus acidophilus oral capsule: 1 cap(s) orally once a day  metoprolol succinate 50 mg oral tablet, extended release: 1 tab(s) orally once a day  nortriptyline 10 mg oral capsule: 1 cap(s) orally once a day (at bedtime)  nystatin 100,000 units/g topical powder: Apply topically to affected area 2 times a day  pantoprazole 40 mg oral delayed release tablet: 1 tab(s) orally once a day (before a meal)   Admelog 100 units/mL injectable solution: 12 unit(s) injectable 3 times a day (before meals)  apixaban 5 mg oral tablet: 1 tab(s) orally every 12 hours  atorvastatin 80 mg oral tablet: 1 tab(s) orally once a day (at bedtime)  clopidogrel 75 mg oral tablet: 1 tab(s) orally once a day  gabapentin 600 mg oral tablet: 1 tab(s) orally 3 times a day  lactobacillus acidophilus oral capsule: 1 cap(s) orally once a day  Lantus 100 units/mL subcutaneous solution: 40 unit(s) subcutaneous once a day (at bedtime)  loperamide 2 mg oral capsule: 1 cap(s) orally every 6 hours as needed for Diarrhea  metoprolol succinate 50 mg oral tablet, extended release: 1 tab(s) orally once a day  nortriptyline 10 mg oral capsule: 1 cap(s) orally once a day (at bedtime)  nystatin 100,000 units/g topical powder: Apply topically to affected area 2 times a day  pantoprazole 40 mg oral delayed release tablet: 1 tab(s) orally once a day (before a meal)   Admelog 100 units/mL injectable solution: 15 unit(s) injectable 3 times a day (before meals)  apixaban 5 mg oral tablet: 1 tab(s) orally every 12 hours  atorvastatin 80 mg oral tablet: 1 tab(s) orally once a day (at bedtime)  clopidogrel 75 mg oral tablet: 1 tab(s) orally once a day  gabapentin 600 mg oral tablet: 1 tab(s) orally 3 times a day  lactobacillus acidophilus oral capsule: 1 cap(s) orally once a day  Lantus 100 units/mL subcutaneous solution: 40 unit(s) subcutaneous once a day (at bedtime)  loperamide 2 mg oral capsule: 1 cap(s) orally every 6 hours as needed for Diarrhea  metoprolol succinate 50 mg oral tablet, extended release: 1 tab(s) orally once a day  nortriptyline 10 mg oral capsule: 1 cap(s) orally once a day (at bedtime)  nystatin 100,000 units/g topical powder: Apply topically to affected area 2 times a day  pantoprazole 40 mg oral delayed release tablet: 1 tab(s) orally once a day (before a meal)

## 2024-02-14 NOTE — PROVIDER CONTACT NOTE (HYPOGLYCEMIA EVENT) - NS PROVIDER CONTACT BACKGROUND-HYPO
Age: 43y    Gender: Female    POCT Blood Glucose:  137 mg/dL (02-14-24 @ 12:56)  97 mg/dL (02-14-24 @ 10:15)  69 mg/dL (02-14-24 @ 09:26)  88 mg/dL (02-14-24 @ 06:37)  80 mg/dL (02-14-24 @ 04:16)  54 mg/dL (02-14-24 @ 03:52)  181 mg/dL (02-13-24 @ 22:06)      eMAR:atorvastatin   80 milliGRAM(s) Oral (02-13-24 @ 22:41)    insulin lispro (ADMELOG) corrective regimen sliding scale   4 Unit(s) SubCutaneous (02-14-24 @ 17:29)

## 2024-02-15 LAB
ANION GAP SERPL CALC-SCNC: 11 MMOL/L — SIGNIFICANT CHANGE UP (ref 5–17)
BASOPHILS # BLD AUTO: 0.02 K/UL — SIGNIFICANT CHANGE UP (ref 0–0.2)
BASOPHILS NFR BLD AUTO: 0.2 % — SIGNIFICANT CHANGE UP (ref 0–2)
BUN SERPL-MCNC: 29 MG/DL — HIGH (ref 7–23)
C PEPTIDE SERPL-MCNC: 9.2 NG/ML — HIGH (ref 1.1–4.4)
CALCIUM SERPL-MCNC: 8.7 MG/DL — SIGNIFICANT CHANGE UP (ref 8.4–10.5)
CHLORIDE SERPL-SCNC: 101 MMOL/L — SIGNIFICANT CHANGE UP (ref 96–108)
CO2 SERPL-SCNC: 24 MMOL/L — SIGNIFICANT CHANGE UP (ref 22–31)
CREAT SERPL-MCNC: 0.98 MG/DL — SIGNIFICANT CHANGE UP (ref 0.5–1.3)
EGFR: 73 ML/MIN/1.73M2 — SIGNIFICANT CHANGE UP
EOSINOPHIL # BLD AUTO: 1.03 K/UL — HIGH (ref 0–0.5)
EOSINOPHIL NFR BLD AUTO: 9.3 % — HIGH (ref 0–6)
GLUCOSE BLDC GLUCOMTR-MCNC: 180 MG/DL — HIGH (ref 70–99)
GLUCOSE BLDC GLUCOMTR-MCNC: 228 MG/DL — HIGH (ref 70–99)
GLUCOSE BLDC GLUCOMTR-MCNC: 243 MG/DL — HIGH (ref 70–99)
GLUCOSE BLDC GLUCOMTR-MCNC: 336 MG/DL — HIGH (ref 70–99)
GLUCOSE BLDC GLUCOMTR-MCNC: 369 MG/DL — HIGH (ref 70–99)
GLUCOSE SERPL-MCNC: 372 MG/DL — HIGH (ref 70–99)
HCT VFR BLD CALC: 36.5 % — SIGNIFICANT CHANGE UP (ref 34.5–45)
HGB BLD-MCNC: 10.8 G/DL — LOW (ref 11.5–15.5)
IMM GRANULOCYTES NFR BLD AUTO: 0.5 % — SIGNIFICANT CHANGE UP (ref 0–0.9)
LYMPHOCYTES # BLD AUTO: 1.83 K/UL — SIGNIFICANT CHANGE UP (ref 1–3.3)
LYMPHOCYTES # BLD AUTO: 16.5 % — SIGNIFICANT CHANGE UP (ref 13–44)
MAGNESIUM SERPL-MCNC: 1.8 MG/DL — SIGNIFICANT CHANGE UP (ref 1.6–2.6)
MCHC RBC-ENTMCNC: 22.3 PG — LOW (ref 27–34)
MCHC RBC-ENTMCNC: 29.6 GM/DL — LOW (ref 32–36)
MCV RBC AUTO: 75.4 FL — LOW (ref 80–100)
MONOCYTES # BLD AUTO: 0.85 K/UL — SIGNIFICANT CHANGE UP (ref 0–0.9)
MONOCYTES NFR BLD AUTO: 7.7 % — SIGNIFICANT CHANGE UP (ref 2–14)
NEUTROPHILS # BLD AUTO: 7.29 K/UL — SIGNIFICANT CHANGE UP (ref 1.8–7.4)
NEUTROPHILS NFR BLD AUTO: 65.8 % — SIGNIFICANT CHANGE UP (ref 43–77)
NRBC # BLD: 0 /100 WBCS — SIGNIFICANT CHANGE UP (ref 0–0)
PHOSPHATE SERPL-MCNC: 3 MG/DL — SIGNIFICANT CHANGE UP (ref 2.5–4.5)
PLATELET # BLD AUTO: 432 K/UL — HIGH (ref 150–400)
POTASSIUM SERPL-MCNC: 4.5 MMOL/L — SIGNIFICANT CHANGE UP (ref 3.5–5.3)
POTASSIUM SERPL-SCNC: 4.5 MMOL/L — SIGNIFICANT CHANGE UP (ref 3.5–5.3)
RBC # BLD: 4.84 M/UL — SIGNIFICANT CHANGE UP (ref 3.8–5.2)
RBC # FLD: 16.2 % — HIGH (ref 10.3–14.5)
SODIUM SERPL-SCNC: 136 MMOL/L — SIGNIFICANT CHANGE UP (ref 135–145)
WBC # BLD: 11.08 K/UL — HIGH (ref 3.8–10.5)
WBC # FLD AUTO: 11.08 K/UL — HIGH (ref 3.8–10.5)

## 2024-02-15 PROCEDURE — 99232 SBSQ HOSP IP/OBS MODERATE 35: CPT

## 2024-02-15 RX ORDER — INSULIN LISPRO 100/ML
2 VIAL (ML) SUBCUTANEOUS
Refills: 0 | Status: DISCONTINUED | OUTPATIENT
Start: 2024-02-15 | End: 2024-02-15

## 2024-02-15 RX ORDER — HUMAN INSULIN 100 [IU]/ML
10 INJECTION, SUSPENSION SUBCUTANEOUS EVERY 8 HOURS
Refills: 0 | Status: DISCONTINUED | OUTPATIENT
Start: 2024-02-15 | End: 2024-02-16

## 2024-02-15 RX ORDER — INSULIN LISPRO 100/ML
8 VIAL (ML) SUBCUTANEOUS
Refills: 0 | Status: DISCONTINUED | OUTPATIENT
Start: 2024-02-15 | End: 2024-02-20

## 2024-02-15 RX ORDER — LOPERAMIDE HCL 2 MG
2 TABLET ORAL ONCE
Refills: 0 | Status: COMPLETED | OUTPATIENT
Start: 2024-02-15 | End: 2024-02-15

## 2024-02-15 RX ADMIN — Medication 2 MILLIGRAM(S): at 22:02

## 2024-02-15 RX ADMIN — Medication 15 MILLIGRAM(S): at 14:33

## 2024-02-15 RX ADMIN — Medication 2 MILLIGRAM(S): at 07:25

## 2024-02-15 RX ADMIN — Medication 975 MILLIGRAM(S): at 22:47

## 2024-02-15 RX ADMIN — Medication 50 MILLIGRAM(S): at 06:24

## 2024-02-15 RX ADMIN — OXYCODONE HYDROCHLORIDE 2.5 MILLIGRAM(S): 5 TABLET ORAL at 09:43

## 2024-02-15 RX ADMIN — CLOPIDOGREL BISULFATE 75 MILLIGRAM(S): 75 TABLET, FILM COATED ORAL at 09:43

## 2024-02-15 RX ADMIN — OXYCODONE HYDROCHLORIDE 2.5 MILLIGRAM(S): 5 TABLET ORAL at 18:41

## 2024-02-15 RX ADMIN — APIXABAN 5 MILLIGRAM(S): 2.5 TABLET, FILM COATED ORAL at 06:25

## 2024-02-15 RX ADMIN — Medication 10: at 07:24

## 2024-02-15 RX ADMIN — Medication 975 MILLIGRAM(S): at 06:24

## 2024-02-15 RX ADMIN — PANTOPRAZOLE SODIUM 40 MILLIGRAM(S): 20 TABLET, DELAYED RELEASE ORAL at 06:24

## 2024-02-15 RX ADMIN — NYSTATIN CREAM 1 APPLICATION(S): 100000 CREAM TOPICAL at 18:42

## 2024-02-15 RX ADMIN — Medication 975 MILLIGRAM(S): at 21:47

## 2024-02-15 RX ADMIN — ATORVASTATIN CALCIUM 80 MILLIGRAM(S): 80 TABLET, FILM COATED ORAL at 21:47

## 2024-02-15 RX ADMIN — Medication 15 MILLIGRAM(S): at 06:24

## 2024-02-15 RX ADMIN — OXYCODONE HYDROCHLORIDE 2.5 MILLIGRAM(S): 5 TABLET ORAL at 19:11

## 2024-02-15 RX ADMIN — LIDOCAINE 1 PATCH: 4 CREAM TOPICAL at 19:39

## 2024-02-15 RX ADMIN — HUMAN INSULIN 10 UNIT(S): 100 INJECTION, SUSPENSION SUBCUTANEOUS at 22:29

## 2024-02-15 RX ADMIN — LIDOCAINE 1 PATCH: 4 CREAM TOPICAL at 18:43

## 2024-02-15 RX ADMIN — APIXABAN 5 MILLIGRAM(S): 2.5 TABLET, FILM COATED ORAL at 18:46

## 2024-02-15 RX ADMIN — PANTOPRAZOLE SODIUM 40 MILLIGRAM(S): 20 TABLET, DELAYED RELEASE ORAL at 18:40

## 2024-02-15 RX ADMIN — Medication 1 TABLET(S): at 09:44

## 2024-02-15 RX ADMIN — Medication 2 MILLIGRAM(S): at 14:16

## 2024-02-15 RX ADMIN — Medication 8 UNIT(S): at 18:47

## 2024-02-15 RX ADMIN — GABAPENTIN 600 MILLIGRAM(S): 400 CAPSULE ORAL at 06:24

## 2024-02-15 RX ADMIN — GABAPENTIN 600 MILLIGRAM(S): 400 CAPSULE ORAL at 14:16

## 2024-02-15 RX ADMIN — NYSTATIN CREAM 1 APPLICATION(S): 100000 CREAM TOPICAL at 06:28

## 2024-02-15 RX ADMIN — GABAPENTIN 600 MILLIGRAM(S): 400 CAPSULE ORAL at 21:47

## 2024-02-15 RX ADMIN — Medication 975 MILLIGRAM(S): at 14:16

## 2024-02-15 RX ADMIN — Medication 4: at 18:46

## 2024-02-15 RX ADMIN — Medication 4: at 11:57

## 2024-02-15 NOTE — DIETITIAN INITIAL EVALUATION ADULT - PERTINENT MEDS FT
MEDICATIONS  (STANDING):  acetaminophen     Tablet .. 975 milliGRAM(s) Oral every 8 hours  apixaban 5 milliGRAM(s) Oral every 12 hours  atorvastatin 80 milliGRAM(s) Oral at bedtime  clopidogrel Tablet 75 milliGRAM(s) Oral daily  dextrose 5%. 1000 milliLiter(s) (50 mL/Hr) IV Continuous <Continuous>  dextrose 50% Injectable 25 Gram(s) IV Push once  gabapentin 600 milliGRAM(s) Oral every 8 hours  glucagon  Injectable 1 milliGRAM(s) IntraMuscular once  influenza   Vaccine 0.5 milliLiter(s) IntraMuscular once  insulin lispro (ADMELOG) corrective regimen sliding scale   SubCutaneous three times a day before meals  insulin lispro (ADMELOG) corrective regimen sliding scale   SubCutaneous at bedtime  insulin lispro Injectable (ADMELOG) 2 Unit(s) SubCutaneous three times a day before meals  lactobacillus acidophilus 1 Tablet(s) Oral daily  lidocaine   4% Patch 1 Patch Transdermal every 24 hours  metoprolol succinate ER 50 milliGRAM(s) Oral daily  nystatin Powder 1 Application(s) Topical two times a day  pantoprazole    Tablet 40 milliGRAM(s) Oral two times a day    MEDICATIONS  (PRN):  dextrose Oral Gel 15 Gram(s) Oral once PRN Blood Glucose LESS THAN 70 milliGRAM(s)/deciliter  ketorolac   Injectable 15 milliGRAM(s) IV Push every 8 hours PRN Moderate Pain (4 - 6)  loperamide 2 milliGRAM(s) Oral every 6 hours PRN Diarrhea  metoclopramide 5 milliGRAM(s) Oral every 8 hours PRN nausea, vomiting  oxyCODONE    IR 2.5 milliGRAM(s) Oral every 6 hours PRN Severe Pain (7 - 10)

## 2024-02-15 NOTE — DIETITIAN INITIAL EVALUATION ADULT - ADD RECOMMEND
1. Continue with current diet order (consistent carbohydrate diet with no snacks)  *yogurt parfait in the morning per pt request and related to decreased PO intakes and previous hypoglycemia*  2. Encourage pt to meet nutritional needs as able  3. Monitor PO intakes, trend weights (weekly), monitor skin integrity, monitor labs (electrolytes, CMP), monitor GI function  4. Encourage adherence to diet education (reinforce as able)  5. Continue insulin regimen prn to promote euglycemia  6. Pain and bowel regimen per team  7. Will continue to assess/honor preferences as able   8. Align nutrition interventions with goals of care at all times

## 2024-02-15 NOTE — DIETITIAN INITIAL EVALUATION ADULT - OTHER INFO
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    Pt seen in room for nutrition assessment. Pt reports low appetite PTA and during hospital stay. As per diet recall PTA: pt endorsed she was "just drinking liquids" like juice, milk, for the past several, ~3-4 days related to her pain, stated she did not wish for or tolerate other foods and things. Currently on consistent carbohydrate diet, tolerating fairly, noted with ~50% PO intakes overall. No cultural, Baptism, or ethnic food preferences noted. NKFA. Denies wt changes, reports wt stability at current wt. Dosing wt: Ideal body weight: pt is % of IBW. Denies nausea, vomiting, diarrhea, constipation, last BM. No edema. Skin: . Joseph: No issues chewing or swallowing noted. Denies pain. Labs reviewed: ; RD to continue to monitor trends. Nutritionally pertinent medications: RD observed pt with no overt signs of muscle or fat wasting. Based on ASPEN guidelines, pt does not meet criteria for malnutrition at this time. Pt amenable to education; RD provided education in regards to the importance of adequate macro and micronutrients, as well as hydration to support ADLs, maintain energy levels and overall functional/nutritional status. General healthful education provided. Pt was receptive and verbalized understanding. No additional nutrition-related concerns. Will continue to follow. Additional nutrition recommendations below to follow. 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    Pt seen in room for nutrition assessment. Pt reports low appetite PTA and during hospital stay. As per diet recall PTA: pt endorsed she was "just drinking liquids" like juice, milk, for the past several, ~3-4 days related to her pain, stated she did not wish for or tolerate other foods and things. Currently on consistent carbohydrate diet, tolerating fairly to poorly, noted with <50% PO intakes overall. No cultural, Judaism, or ethnic food preferences noted. Noted with iodine and shellfish allergies, severe allergies, noted in electronic medical records. Pt endorsed she has lost ~100 pounds in the past ~1 year, not fully intentional per pt. Dosing wt: 223 pounds, Ideal body weight: 135 pounds, pt is 165% of ideal body weight. Pt noted with nausea intermittently over the past several days, pt was nauseous when speaking to RD; no noted vomiting, diarrhea, constipation, pt has a colostomy to the left lower quadrant, noted with ~1725mL in the past 24h. Noted with trace edema to the right foot. Skin: right hip and right inner thigh surgical incisions, colostomy to left lower quadrant. Joseph: 19. No issues chewing or swallowing noted. Noted with moderate pain. Labs reviewed: pt's POCT glucose levels were low yesterday morning, levels 54 and 69; within the past 24h, pt noted with elevated blood sugar (228, 336, 369, 256, 223, 137), elevated BUN (29), elevated serum Glucose (372), elevated A1c (10.8); RD to continue to monitor trends. Nutritionally pertinent medications/supplements: insulin, loperamide, lactobacillus acidophilus, protonix. RD observed pt with no overt signs of muscle or fat wasting. Based on ASPEN guidelines, pt does not meet criteria for malnutrition at this time. Pt amenable to brief education; RD provided education in regards to the importance of adequate macro and micronutrients, as well as hydration to support ADLs, maintain energy levels and overall functional/nutritional status. General healthful education provided. Nutrient-dense foods promoted. Pt's current diet discussed. Diabetes education provided. Discussed carbohydrate examples, checking finger sticks, role of fiber, plate model, basic CHO counting. Reviewed healthy eating education. Pt was receptive and verbalized understanding. Pt stated she would enjoy the yogurt parfait in the morning while her appetite is low, and enjoys caesar salads with vinegar dressing instead of caesar salad dressing. No additional nourishments/oral nutrition supplements requested/preferred by pt. No additional nutrition-related concerns. Will continue to follow. Additional nutrition recommendations below to follow.

## 2024-02-15 NOTE — PROGRESS NOTE ADULT - ASSESSMENT
43F w/ PMHx Asthma, CVA (11/2021; residual L>R weakness), PE (4/2023 on Eliquis), T2DM (on insulin), HTN, HLD with hx abdominal necrotizing fasciitis s/p diverting colostomy creation (11/2021; [facility]), CAD (on , 2 NAT in 6/23) with PSHx of numerous abdominal wall hernia repairs, who presents with increased colostomy output, dec PO intake, nausea and vomiting x2 weeks. Patient with multiple medical complaints, with unclear diagnosis at this time. High ileostomy output could be secondary to drinking primarily liquids, GI viral illness, poor glucose control, etc. Recent bleeding from colostomy likely 2/2 friable skin surrounding stoma however cannot exclude internal source without endoscopy at this time.     Plan:  - GI for EGD abd Colonoscopy   - Team 5c to continue to follow

## 2024-02-15 NOTE — DIETITIAN INITIAL EVALUATION ADULT - PROBLEM SELECTOR PLAN 4
One episode of dark and bright red blood without drop in hemoglobin. She was tachycardic but this is in setting of dehydration. Tenderness in lower quadrants on exam without rebound or guarding and abd is soft, CT AP unremarkable. Suspect lower GI tract issue and will likely need scope nonurgently.  - active T+S  - transfuse below 7  - monitor for anymore episodes  - increased PPI to BID

## 2024-02-15 NOTE — PROGRESS NOTE ADULT - ASSESSMENT
43F PMH CVA (L weakness), PE (Eliquis), multiple abdominal hernia repairs (Dr. Hurtado), abdominal nec fasc s/p Brandie's w/ ostomy, DM (A1c 10.8, previously 16), CAD w/ stent (4/2023), BMI 35, admitted for increased ostomy output x4-5d and hypoglycemia. Surgery consulted for large parastomal hernia, CT on admission showing large parastomal hernia, possibly compressing stoma, however patient having output on ostomy.  Will defer parastomal hernia repair at this time.     Recommendations   - Tight glycemic control   - No surgical intervention at this time for parastomal hernia  - Rest of care as per primary team   - Plan discussed with Dr Hollingsworth   - Team 4 will follow

## 2024-02-15 NOTE — PROGRESS NOTE ADULT - SUBJECTIVE AND OBJECTIVE BOX
SUBJECTIVE: Patient seen and examined bedside by Surgical resident. Pt states that she is having poor PO intake with +n/-v. States urine output is decreased from normal and remains a concentrated looking krishna. +lightheadedness, ambulating to the bathroom without issue. blood sugars remain elevated. States that these high outputs have been going for approximately a week now, previously was only changing the bag once daily, now is every few hours, last emesis was on 2/13. States she was seen by ostomy nursing.     apixaban 5 milliGRAM(s) Oral every 12 hours  clopidogrel Tablet 75 milliGRAM(s) Oral daily  metoprolol succinate ER 50 milliGRAM(s) Oral daily    MEDICATIONS  (PRN):  dextrose Oral Gel 15 Gram(s) Oral once PRN Blood Glucose LESS THAN 70 milliGRAM(s)/deciliter  ketorolac   Injectable 15 milliGRAM(s) IV Push every 8 hours PRN Moderate Pain (4 - 6)  loperamide 2 milliGRAM(s) Oral every 6 hours PRN Diarrhea  metoclopramide 5 milliGRAM(s) Oral every 8 hours PRN nausea, vomiting  oxyCODONE    IR 2.5 milliGRAM(s) Oral every 6 hours PRN Severe Pain (7 - 10)      I&O's Detail    14 Feb 2024 07:01  -  15 Feb 2024 07:00  --------------------------------------------------------  IN:    Oral Fluid: 540 mL  Total IN: 540 mL    OUT:    Colostomy (mL): 1350 mL    Voided (mL): 600 mL  Total OUT: 1950 mL    Total NET: -1410 mL      15 Feb 2024 07:01  -  15 Feb 2024 12:45  --------------------------------------------------------  IN:  Total IN: 0 mL    OUT:    Colostomy (mL): 175 mL  Total OUT: 175 mL    Total NET: -175 mL          Vital Signs Last 24 Hrs  T(C): 36.8 (15 Feb 2024 09:43), Max: 36.8 (15 Feb 2024 09:43)  T(F): 98.3 (15 Feb 2024 09:43), Max: 98.3 (15 Feb 2024 09:43)  HR: 94 (15 Feb 2024 09:43) (90 - 100)  BP: 146/89 (15 Feb 2024 09:43) (100/52 - 149/81)  BP(mean): 95 (14 Feb 2024 16:35) (95 - 95)  RR: 17 (15 Feb 2024 09:43) (16 - 18)  SpO2: 97% (15 Feb 2024 09:43) (96% - 98%)    Parameters below as of 15 Feb 2024 09:43  Patient On (Oxygen Delivery Method): room air        General: NAD, resting comfortably in bed  C/V: NSR  Pulm: Nonlabored breathing, no respiratory distress  Abd: soft, diffusely tender to palpation. left of midline ostomy, present, patient and productive with brown green thick liquid output, no blood present on inspection.   Extrem: WWP, no edema, SCDs in place    LABS:                        10.8   11.08 )-----------( 432      ( 15 Feb 2024 07:01 )             36.5     02-15    136  |  101  |  29<H>  ----------------------------<  372<H>  4.5   |  24  |  0.98    Ca    8.7      15 Feb 2024 07:01  Phos  3.0     02-15  Mg     1.8     02-15        Urinalysis Basic - ( 15 Feb 2024 07:01 )    Color: x / Appearance: x / SG: x / pH: x  Gluc: 372 mg/dL / Ketone: x  / Bili: x / Urobili: x   Blood: x / Protein: x / Nitrite: x   Leuk Esterase: x / RBC: x / WBC x   Sq Epi: x / Non Sq Epi: x / Bacteria: x        RADIOLOGY & ADDITIONAL STUDIES:

## 2024-02-15 NOTE — PROGRESS NOTE ADULT - PROBLEM SELECTOR PLAN 1
Type 2 diabetes mellitus   - C-peptide, insulin, and inuslin abs ordered.  - No Lantus  - No nutritional lispro before each meal.  - Continue lispro moderate dose sliding scale before meals and at bedtime.  - Patient's fingerstick glucose goal is 100-180 mg/dL.    - For discharge: U500 units every 8 hours, admelog units before meals, stop ozempic due to c/f gastroparesis, stop metformin in setting of increase ostomy output.  - Patient can follow up at discharge with [facility] by calling [phone] to make an appointment.      Case discussed with Dr. Bowden. Primary team updated. Type 2 diabetes mellitus   - C-peptide, insulin, and inuslin abs ordered.  - Start NPH 10 units every 8 hours (6A, 2P, 10P)  - Start lispro 8 units before meals.  - Continue lispro moderate dose sliding scale before meals and at bedtime.  - Patient's fingerstick glucose goal is 100-180 mg/dL.    - For discharge: Insulin dose TBD, stop ozempic due to c/f gastroparesis, stop metformin in setting of increase ostomy output.  - Patient can follow up at discharge with [facility] by calling [phone] to make an appointment.      Case discussed with Dr. Bowden. Primary team updated.

## 2024-02-15 NOTE — PROGRESS NOTE ADULT - ASSESSMENT
43y Female with type 2 insulin-requiring DM, markedly insulin-resistant on U-500 insulin, admitted with hypoglycemia secondary to N/V for the past 24 hours.    Continues to have episodes of hypoglycemia per CGM on much less insulin than she takes at home, likely from long half life of U-500 v gastroparesis v insulin binding antibodies v reactive hypoglycemia (continues to have good amount of endogenous insulin, c peptide 9.7)    A1C: 10.8 % (16.4 % six weeks ago)  C-peptide 9.7 (May 2023)  BUN: 29  Creatinine: 0.98  GFR: 73  Weight: 101.2  BMI: 34.9   43y Female with type 2 insulin-requiring DM, markedly insulin-resistant on U-500 insulin, admitted with hypoglycemia secondary to N/V for the past 24 hours.    Glucose now rising. Not eating meals, but some high glycemic foods at bedside. Labile glucose likely from long half life of U-500 v gastroparesis v insulin binding antibodies v reactive hypoglycemia (continues to have good amount of endogenous insulin, c peptide 9.7)    A1C: 10.8 % (16.4 % six weeks ago)  C-peptide 9.7 (May 2023)  BUN: 29  Creatinine: 0.98  GFR: 73  Weight: 101.2  BMI: 34.9

## 2024-02-15 NOTE — DIETITIAN INITIAL EVALUATION ADULT - PERTINENT LABORATORY DATA
02-15    136  |  101  |  29<H>  ----------------------------<  372<H>  4.5   |  24  |  0.98    Ca    8.7      15 Feb 2024 07:01  Phos  3.0     02-15  Mg     1.8     02-15    POCT Blood Glucose.: 228 mg/dL (02-15-24 @ 11:24)  A1C with Estimated Average Glucose Result: 10.8 % (02-14-24 @ 07:54)  A1C with Estimated Average Glucose Result: 16.4 % (01-01-24 @ 04:36)  A1C with Estimated Average Glucose Result: 11.9 % (06-21-23 @ 07:24)

## 2024-02-15 NOTE — PROGRESS NOTE ADULT - SUBJECTIVE AND OBJECTIVE BOX
SUBJECTIVE / INTERVAL HPI: Patient was seen and examined this morning.     CAPILLARY BLOOD GLUCOSE & INSULIN RECEIVED  54 mg/dL (02-14 @ 03:52)  80 mg/dL (02-14 @ 04:16)  88 mg/dL (02-14 @ 06:37)  64 mg/dL (02-14 @ 07:54)  69 mg/dL (02-14 @ 09:26)  97 mg/dL (02-14 @ 10:15)  137 mg/dL (02-14 @ 12:56)  223 mg/dL (02-14 @ 16:56) - Lispro 4  250 mg/dL (02-14 @ 22:27)  369 mg/dL (02-15 @ 06:59)  372 mg/dL (02-15 @ 07:01) - Lispro 10  336 mg/dL (02-15 @ 08:05)  228 mg/dL (02-15 @ 11:24)      REVIEW OF SYSTEMS  Constitutional:  Negative fever, chills or loss of appetite.  Eyes:  Negative blurry vision or double vision.  Cardiovascular:  Negative for chest pain or palpitations.  Respiratory:  Negative for cough, wheezing, or shortness of breath.    Gastrointestinal:  Negative for nausea, vomiting, diarrhea, constipation, or abdominal pain.  Genitourinary:  Negative frequency, urgency or dysuria.  Neurologic:  No headache, confusion, dizziness, lightheadedness.    PHYSICAL EXAM  Vital Signs Last 24 Hrs  T(C): 36.8 (15 Feb 2024 09:43), Max: 36.8 (15 Feb 2024 09:43)  T(F): 98.3 (15 Feb 2024 09:43), Max: 98.3 (15 Feb 2024 09:43)  HR: 94 (15 Feb 2024 09:43) (90 - 100)  BP: 146/89 (15 Feb 2024 09:43) (100/52 - 149/81)  BP(mean): 95 (14 Feb 2024 16:35) (95 - 95)  RR: 17 (15 Feb 2024 09:43) (16 - 18)  SpO2: 97% (15 Feb 2024 09:43) (96% - 98%)    Parameters below as of 15 Feb 2024 09:43  Patient On (Oxygen Delivery Method): room air    Constitutional: Awake, alert, in no acute distress. Obese  HEENT: Normocephalic, atraumatic, NAOMI.  Respiratory: Lungs clear to ausculation bilaterally.   Cardiovascular: regular rhythm, normal S1 and S2, no audible murmurs.   GI: soft, non-tender, non-distended, bowel sounds present. + ostomy  Extremities: No lower extremity edema.  Psychiatric: AAO x 3. Normal affect/mood.     LABS  CBC - WBC/HGB/HTC/PLT: 11.08/10.8/36.5/432 (02-15-24)  BMP - Na/K/Cl/Bicarb/BUN/Cr/Gluc/AG/eGFR: 136/4.5/101/24/29/0.98/372/11/73 (02-15-24)  Ca - 8.7 (02-15-24)  Phos - 3.0 (02-15-24)  Mg - 1.8 (02-15-24)  LFT - Alb/Tprot/Tbili/Dbili/AlkPhos/ALT/AST: 3.0/--/<0.2/0.2/97/6/13 (02-11-24)  PT/aPTT/INR: 10.8/28.3/0.95 (02-11-24)   Thyroid Stimulating Hormone, Serum: 0.797 (12-31-23)      MEDICATIONS  MEDICATIONS  (STANDING):  acetaminophen     Tablet .. 975 milliGRAM(s) Oral every 8 hours  apixaban 5 milliGRAM(s) Oral every 12 hours  atorvastatin 80 milliGRAM(s) Oral at bedtime  clopidogrel Tablet 75 milliGRAM(s) Oral daily  dextrose 5%. 1000 milliLiter(s) (50 mL/Hr) IV Continuous <Continuous>  dextrose 50% Injectable 25 Gram(s) IV Push once  gabapentin 600 milliGRAM(s) Oral every 8 hours  glucagon  Injectable 1 milliGRAM(s) IntraMuscular once  influenza   Vaccine 0.5 milliLiter(s) IntraMuscular once  insulin lispro (ADMELOG) corrective regimen sliding scale   SubCutaneous three times a day before meals  insulin lispro (ADMELOG) corrective regimen sliding scale   SubCutaneous at bedtime  lactobacillus acidophilus 1 Tablet(s) Oral daily  lidocaine   4% Patch 1 Patch Transdermal every 24 hours  metoprolol succinate ER 50 milliGRAM(s) Oral daily  nystatin Powder 1 Application(s) Topical two times a day  pantoprazole    Tablet 40 milliGRAM(s) Oral two times a day    MEDICATIONS  (PRN):  dextrose Oral Gel 15 Gram(s) Oral once PRN Blood Glucose LESS THAN 70 milliGRAM(s)/deciliter  ketorolac   Injectable 15 milliGRAM(s) IV Push every 8 hours PRN Moderate Pain (4 - 6)  loperamide 2 milliGRAM(s) Oral every 6 hours PRN Diarrhea  metoclopramide 5 milliGRAM(s) Oral every 8 hours PRN nausea, vomiting  oxyCODONE    IR 2.5 milliGRAM(s) Oral every 6 hours PRN Severe Pain (7 - 10)     SUBJECTIVE / INTERVAL HPI: Patient was seen and examined this morning. She is fixated on ostomy output with blood. Endorses abd pain and nausea, but no vomiting. Reports pain and malodor to vaginal area and decreased urine output. Denies eating dinner or breakfast. Ate 2 chocolates and 1/2 glass of soda around dinner time because "it was groves's day." Per CGM, glucose was elevated throughout the night. Surgery has been consulted.    CAPILLARY BLOOD GLUCOSE & INSULIN RECEIVED  54 mg/dL (02-14 @ 03:52)  80 mg/dL (02-14 @ 04:16)  88 mg/dL (02-14 @ 06:37)  64 mg/dL (02-14 @ 07:54)  69 mg/dL (02-14 @ 09:26)  97 mg/dL (02-14 @ 10:15)  137 mg/dL (02-14 @ 12:56)  223 mg/dL (02-14 @ 16:56) - Lispro 4  250 mg/dL (02-14 @ 22:27)  369 mg/dL (02-15 @ 06:59)  372 mg/dL (02-15 @ 07:01) - Lispro 10  336 mg/dL (02-15 @ 08:05)  228 mg/dL (02-15 @ 11:24)      REVIEW OF SYSTEMS  Constitutional:  Negative fever, chills or loss of appetite.  Eyes:  Negative blurry vision or double vision.  Cardiovascular:  Negative for chest pain or palpitations.  Respiratory:  Negative for cough, wheezing, or shortness of breath.    Gastrointestinal:  See HPI  Genitourinary:  See HPI  Neurologic:  No headache, confusion, dizziness, lightheadedness.    PHYSICAL EXAM  Vital Signs Last 24 Hrs  T(C): 36.8 (15 Feb 2024 09:43), Max: 36.8 (15 Feb 2024 09:43)  T(F): 98.3 (15 Feb 2024 09:43), Max: 98.3 (15 Feb 2024 09:43)  HR: 94 (15 Feb 2024 09:43) (90 - 100)  BP: 146/89 (15 Feb 2024 09:43) (100/52 - 149/81)  BP(mean): 95 (14 Feb 2024 16:35) (95 - 95)  RR: 17 (15 Feb 2024 09:43) (16 - 18)  SpO2: 97% (15 Feb 2024 09:43) (96% - 98%)    Parameters below as of 15 Feb 2024 09:43  Patient On (Oxygen Delivery Method): room air    Constitutional: Awake, alert, in no acute distress. Obese  HEENT: Normocephalic, atraumatic, NAOMI.  Respiratory: Lungs clear to ausculation bilaterally.   Cardiovascular: regular rhythm, normal S1 and S2, no audible murmurs.   GI: soft, non-tender, non-distended, bowel sounds present. + ostomy  Extremities: No lower extremity edema.  Psychiatric: AAO x 3. Normal affect/mood.     LABS  CBC - WBC/HGB/HTC/PLT: 11.08/10.8/36.5/432 (02-15-24)  BMP - Na/K/Cl/Bicarb/BUN/Cr/Gluc/AG/eGFR: 136/4.5/101/24/29/0.98/372/11/73 (02-15-24)  Ca - 8.7 (02-15-24)  Phos - 3.0 (02-15-24)  Mg - 1.8 (02-15-24)  LFT - Alb/Tprot/Tbili/Dbili/AlkPhos/ALT/AST: 3.0/--/<0.2/0.2/97/6/13 (02-11-24)  PT/aPTT/INR: 10.8/28.3/0.95 (02-11-24)   Thyroid Stimulating Hormone, Serum: 0.797 (12-31-23)      MEDICATIONS  MEDICATIONS  (STANDING):  acetaminophen     Tablet .. 975 milliGRAM(s) Oral every 8 hours  apixaban 5 milliGRAM(s) Oral every 12 hours  atorvastatin 80 milliGRAM(s) Oral at bedtime  clopidogrel Tablet 75 milliGRAM(s) Oral daily  dextrose 5%. 1000 milliLiter(s) (50 mL/Hr) IV Continuous <Continuous>  dextrose 50% Injectable 25 Gram(s) IV Push once  gabapentin 600 milliGRAM(s) Oral every 8 hours  glucagon  Injectable 1 milliGRAM(s) IntraMuscular once  influenza   Vaccine 0.5 milliLiter(s) IntraMuscular once  insulin lispro (ADMELOG) corrective regimen sliding scale   SubCutaneous three times a day before meals  insulin lispro (ADMELOG) corrective regimen sliding scale   SubCutaneous at bedtime  lactobacillus acidophilus 1 Tablet(s) Oral daily  lidocaine   4% Patch 1 Patch Transdermal every 24 hours  metoprolol succinate ER 50 milliGRAM(s) Oral daily  nystatin Powder 1 Application(s) Topical two times a day  pantoprazole    Tablet 40 milliGRAM(s) Oral two times a day    MEDICATIONS  (PRN):  dextrose Oral Gel 15 Gram(s) Oral once PRN Blood Glucose LESS THAN 70 milliGRAM(s)/deciliter  ketorolac   Injectable 15 milliGRAM(s) IV Push every 8 hours PRN Moderate Pain (4 - 6)  loperamide 2 milliGRAM(s) Oral every 6 hours PRN Diarrhea  metoclopramide 5 milliGRAM(s) Oral every 8 hours PRN nausea, vomiting  oxyCODONE    IR 2.5 milliGRAM(s) Oral every 6 hours PRN Severe Pain (7 - 10)

## 2024-02-15 NOTE — DIETITIAN INITIAL EVALUATION ADULT - PROBLEM SELECTOR PLAN 3
Pressure like pain mostly in lower quadrants associated with nausea, NBNB vomiting (1 episode), and increased ostomy output. 1 small episode of dark and bright red blood in output. Tachy but afebrile nad generalized tenderness worse in lower quadrants, santoro's sign negative, no guarding or rigidity. Concentrated CBC. Lower suspicion for infection. Lipase negative, EKG without ischemic changes. CT unremarkable. Although lactate was elevated, it cleared with fluids and patient is comfortable appearing on exam, lower suspicion for acute mesenteric ischemia. Could have chronic mesenteric ischemia given CAD and worsened pain over last few days while being volume down. Also considering gastroparesis as the inciting factor for these events given DM and neuropathy, but this would not explain blood and strange she would have increased output with reduced motility. Will repeat ekg and start reglan if qtc is shorter which will help nausea and encourage solid food intake. Will treat pain symptomatically, at home on oxycodone 5mg q6 prn for her back pain, will continue here with istop.  - repeat ekg-->start reglan  - tylenol 650mg Q6h  - oxycodone 5mg QID prn for moderate pain  - oxycodone 10mg QID for severe pain

## 2024-02-15 NOTE — PROGRESS NOTE ADULT - SUBJECTIVE AND OBJECTIVE BOX
Pt seen and examined at bedside this am by surgery team. Team 4 consulted for large parastomal hernia, presenting with bloody output on colostomy site, probably from skin bleeding, lower GI bleed cannot be ruled out.       Vital Signs Last 24 Hrs  T(C): 36.4 (15 Feb 2024 13:25), Max: 36.8 (15 Feb 2024 09:43)  T(F): 97.5 (15 Feb 2024 13:25), Max: 98.3 (15 Feb 2024 09:43)  HR: 100 (15 Feb 2024 13:25) (93 - 100)  BP: 133/77 (15 Feb 2024 13:25) (124/63 - 149/81)  BP(mean): 95 (14 Feb 2024 16:35) (95 - 95)  RR: 17 (15 Feb 2024 13:25) (16 - 18)  SpO2: 95% (15 Feb 2024 13:25) (95% - 98%)    Parameters below as of 15 Feb 2024 13:25  Patient On (Oxygen Delivery Method): room air      Physical Exam:   General: AAOx3, NAD, WDWN, laying comfortably in bed  Cardio: S1,S2, No MRG, RRR  Pulm: Lungs bilaterally clear to auscultation  Abdomen: soft, mildly ttp throughout. Colostomy p/p/p with output and gas with friable skin surrounding the stoma. There is a large parastomal hernia.  Extremities: WWP, peripheral pulses appreciated    I&O's Detail    14 Feb 2024 07:01  -  15 Feb 2024 07:00  --------------------------------------------------------  IN:    Oral Fluid: 540 mL  Total IN: 540 mL    OUT:    Colostomy (mL): 1350 mL    Voided (mL): 600 mL  Total OUT: 1950 mL    Total NET: -1410 mL      15 Feb 2024 07:01  -  15 Feb 2024 13:58  --------------------------------------------------------  IN:  Total IN: 0 mL    OUT:    Colostomy (mL): 375 mL  Total OUT: 375 mL    Total NET: -375 mL          LABS:                        10.8   11.08 )-----------( 432      ( 15 Feb 2024 07:01 )             36.5     02-15    136  |  101  |  29<H>  ----------------------------<  372<H>  4.5   |  24  |  0.98    Ca    8.7      15 Feb 2024 07:01  Phos  3.0     02-15  Mg     1.8     02-15        Urinalysis Basic - ( 15 Feb 2024 07:01 )    Color: x / Appearance: x / SG: x / pH: x  Gluc: 372 mg/dL / Ketone: x  / Bili: x / Urobili: x   Blood: x / Protein: x / Nitrite: x   Leuk Esterase: x / RBC: x / WBC x   Sq Epi: x / Non Sq Epi: x / Bacteria: x

## 2024-02-15 NOTE — DIETITIAN INITIAL EVALUATION ADULT - SIGNS/SYMPTOMS
as evidenced by pt stated minimal PO intakes, noted with <50% PO current admission, 3-4 days PTA Private car

## 2024-02-15 NOTE — DIETITIAN INITIAL EVALUATION ADULT - PERSON TAUGHT/METHOD
Pt amenable to brief education; RD provided education in regards to the importance of adequate macro and micronutrients, as well as hydration to support ADLs, maintain energy levels and overall functional/nutritional status. General healthful education provided. Nutrient-dense foods promoted. Pt's current diet discussed. Diabetes education provided. Discussed carbohydrate examples, checking finger sticks, role of fiber, plate model, basic CHO counting. Reviewed healthy eating education. Pt was receptive and verbalized understanding. Pt stated she would enjoy the yogurt parfait in the morning while her appetite is low, and enjoys caesar salads with vinegar dressing instead of caesar salad dressing. No additional nourishments/oral nutrition supplements requested/preferred by pt./verbal instruction/patient instructed

## 2024-02-15 NOTE — DIETITIAN INITIAL EVALUATION ADULT - OTHER CALCULATIONS
Based on Standards of Care pt >% ideal body weight, thus ideal body weight used for all calculations. Needs adjusted for current age, clinical condition/status, high ostomy output.

## 2024-02-16 LAB
ALBUMIN SERPL ELPH-MCNC: 3.3 G/DL — SIGNIFICANT CHANGE UP (ref 3.3–5)
ALP SERPL-CCNC: 112 U/L — SIGNIFICANT CHANGE UP (ref 40–120)
ALT FLD-CCNC: 8 U/L — LOW (ref 10–45)
ANION GAP SERPL CALC-SCNC: 13 MMOL/L — SIGNIFICANT CHANGE UP (ref 5–17)
ANISOCYTOSIS BLD QL: SLIGHT — SIGNIFICANT CHANGE UP
APTT BLD: 34.2 SEC — SIGNIFICANT CHANGE UP (ref 24.5–35.6)
AST SERPL-CCNC: 9 U/L — LOW (ref 10–40)
BASOPHILS # BLD AUTO: 0.14 K/UL — SIGNIFICANT CHANGE UP (ref 0–0.2)
BASOPHILS NFR BLD AUTO: 0.9 % — SIGNIFICANT CHANGE UP (ref 0–2)
BILIRUB SERPL-MCNC: 0.2 MG/DL — SIGNIFICANT CHANGE UP (ref 0.2–1.2)
BLD GP AB SCN SERPL QL: POSITIVE — SIGNIFICANT CHANGE UP
BUN SERPL-MCNC: 17 MG/DL — SIGNIFICANT CHANGE UP (ref 7–23)
CALCIUM SERPL-MCNC: 8.7 MG/DL — SIGNIFICANT CHANGE UP (ref 8.4–10.5)
CHLORIDE SERPL-SCNC: 104 MMOL/L — SIGNIFICANT CHANGE UP (ref 96–108)
CO2 SERPL-SCNC: 20 MMOL/L — LOW (ref 22–31)
CREAT SERPL-MCNC: 0.95 MG/DL — SIGNIFICANT CHANGE UP (ref 0.5–1.3)
CULTURE RESULTS: SIGNIFICANT CHANGE UP
CULTURE RESULTS: SIGNIFICANT CHANGE UP
EGFR: 76 ML/MIN/1.73M2 — SIGNIFICANT CHANGE UP
EOSINOPHIL # BLD AUTO: 0.26 K/UL — SIGNIFICANT CHANGE UP (ref 0–0.5)
EOSINOPHIL NFR BLD AUTO: 1.7 % — SIGNIFICANT CHANGE UP (ref 0–6)
GI PCR PANEL: SIGNIFICANT CHANGE UP
GIANT PLATELETS BLD QL SMEAR: PRESENT — SIGNIFICANT CHANGE UP
GLUCOSE BLDC GLUCOMTR-MCNC: 133 MG/DL — HIGH (ref 70–99)
GLUCOSE BLDC GLUCOMTR-MCNC: 147 MG/DL — HIGH (ref 70–99)
GLUCOSE BLDC GLUCOMTR-MCNC: 187 MG/DL — HIGH (ref 70–99)
GLUCOSE BLDC GLUCOMTR-MCNC: 189 MG/DL — HIGH (ref 70–99)
GLUCOSE BLDC GLUCOMTR-MCNC: 193 MG/DL — HIGH (ref 70–99)
GLUCOSE BLDC GLUCOMTR-MCNC: 202 MG/DL — HIGH (ref 70–99)
GLUCOSE BLDC GLUCOMTR-MCNC: 57 MG/DL — LOW (ref 70–99)
GLUCOSE BLDC GLUCOMTR-MCNC: 71 MG/DL — SIGNIFICANT CHANGE UP (ref 70–99)
GLUCOSE BLDC GLUCOMTR-MCNC: 90 MG/DL — SIGNIFICANT CHANGE UP (ref 70–99)
GLUCOSE SERPL-MCNC: 145 MG/DL — HIGH (ref 70–99)
HCT VFR BLD CALC: 36.7 % — SIGNIFICANT CHANGE UP (ref 34.5–45)
HGB BLD-MCNC: 10.8 G/DL — LOW (ref 11.5–15.5)
HYPOCHROMIA BLD QL: SLIGHT — SIGNIFICANT CHANGE UP
INR BLD: 1.11 — SIGNIFICANT CHANGE UP (ref 0.85–1.18)
LYMPHOCYTES # BLD AUTO: 1.89 K/UL — SIGNIFICANT CHANGE UP (ref 1–3.3)
LYMPHOCYTES # BLD AUTO: 12.2 % — LOW (ref 13–44)
MAGNESIUM SERPL-MCNC: 1.6 MG/DL — SIGNIFICANT CHANGE UP (ref 1.6–2.6)
MANUAL SMEAR VERIFICATION: SIGNIFICANT CHANGE UP
MCHC RBC-ENTMCNC: 22 PG — LOW (ref 27–34)
MCHC RBC-ENTMCNC: 29.4 GM/DL — LOW (ref 32–36)
MCV RBC AUTO: 74.7 FL — LOW (ref 80–100)
MICROCYTES BLD QL: SLIGHT — SIGNIFICANT CHANGE UP
MONOCYTES # BLD AUTO: 0.54 K/UL — SIGNIFICANT CHANGE UP (ref 0–0.9)
MONOCYTES NFR BLD AUTO: 3.5 % — SIGNIFICANT CHANGE UP (ref 2–14)
NEUTROPHILS # BLD AUTO: 12.69 K/UL — HIGH (ref 1.8–7.4)
NEUTROPHILS NFR BLD AUTO: 81.7 % — HIGH (ref 43–77)
OVALOCYTES BLD QL SMEAR: SLIGHT — SIGNIFICANT CHANGE UP
PHOSPHATE SERPL-MCNC: 2.4 MG/DL — LOW (ref 2.5–4.5)
PLAT MORPH BLD: NORMAL — SIGNIFICANT CHANGE UP
PLATELET # BLD AUTO: 467 K/UL — HIGH (ref 150–400)
POIKILOCYTOSIS BLD QL AUTO: SLIGHT — SIGNIFICANT CHANGE UP
POLYCHROMASIA BLD QL SMEAR: SLIGHT — SIGNIFICANT CHANGE UP
POTASSIUM SERPL-MCNC: 4.3 MMOL/L — SIGNIFICANT CHANGE UP (ref 3.5–5.3)
POTASSIUM SERPL-SCNC: 4.3 MMOL/L — SIGNIFICANT CHANGE UP (ref 3.5–5.3)
PROT SERPL-MCNC: 7.3 G/DL — SIGNIFICANT CHANGE UP (ref 6–8.3)
PROTHROM AB SERPL-ACNC: 12.6 SEC — SIGNIFICANT CHANGE UP (ref 9.5–13)
RBC # BLD: 4.91 M/UL — SIGNIFICANT CHANGE UP (ref 3.8–5.2)
RBC # FLD: 16.1 % — HIGH (ref 10.3–14.5)
RBC BLD AUTO: ABNORMAL
RH IG SCN BLD-IMP: NEGATIVE — SIGNIFICANT CHANGE UP
SODIUM SERPL-SCNC: 137 MMOL/L — SIGNIFICANT CHANGE UP (ref 135–145)
SPECIMEN SOURCE: SIGNIFICANT CHANGE UP
SPECIMEN SOURCE: SIGNIFICANT CHANGE UP
WBC # BLD: 15.53 K/UL — HIGH (ref 3.8–10.5)
WBC # FLD AUTO: 15.53 K/UL — HIGH (ref 3.8–10.5)

## 2024-02-16 PROCEDURE — 99233 SBSQ HOSP IP/OBS HIGH 50: CPT | Mod: GC

## 2024-02-16 PROCEDURE — 99232 SBSQ HOSP IP/OBS MODERATE 35: CPT

## 2024-02-16 RX ORDER — PSYLLIUM SEED (WITH DEXTROSE)
1 POWDER (GRAM) ORAL
Refills: 0 | Status: DISCONTINUED | OUTPATIENT
Start: 2024-02-16 | End: 2024-02-20

## 2024-02-16 RX ORDER — CLOPIDOGREL BISULFATE 75 MG/1
75 TABLET, FILM COATED ORAL EVERY 24 HOURS
Refills: 0 | Status: DISCONTINUED | OUTPATIENT
Start: 2024-02-16 | End: 2024-02-20

## 2024-02-16 RX ORDER — HUMAN INSULIN 100 [IU]/ML
5 INJECTION, SUSPENSION SUBCUTANEOUS EVERY 8 HOURS
Refills: 0 | Status: DISCONTINUED | OUTPATIENT
Start: 2024-02-16 | End: 2024-02-20

## 2024-02-16 RX ORDER — OXYCODONE HYDROCHLORIDE 5 MG/1
2.5 TABLET ORAL ONCE
Refills: 0 | Status: DISCONTINUED | OUTPATIENT
Start: 2024-02-16 | End: 2024-02-16

## 2024-02-16 RX ADMIN — GABAPENTIN 600 MILLIGRAM(S): 400 CAPSULE ORAL at 05:48

## 2024-02-16 RX ADMIN — APIXABAN 5 MILLIGRAM(S): 2.5 TABLET, FILM COATED ORAL at 18:43

## 2024-02-16 RX ADMIN — Medication 2: at 13:48

## 2024-02-16 RX ADMIN — Medication 975 MILLIGRAM(S): at 13:45

## 2024-02-16 RX ADMIN — GABAPENTIN 600 MILLIGRAM(S): 400 CAPSULE ORAL at 22:40

## 2024-02-16 RX ADMIN — NYSTATIN CREAM 1 APPLICATION(S): 100000 CREAM TOPICAL at 18:43

## 2024-02-16 RX ADMIN — OXYCODONE HYDROCHLORIDE 2.5 MILLIGRAM(S): 5 TABLET ORAL at 02:11

## 2024-02-16 RX ADMIN — NYSTATIN CREAM 1 APPLICATION(S): 100000 CREAM TOPICAL at 05:48

## 2024-02-16 RX ADMIN — Medication 1 TABLET(S): at 09:47

## 2024-02-16 RX ADMIN — Medication 8 UNIT(S): at 09:47

## 2024-02-16 RX ADMIN — OXYCODONE HYDROCHLORIDE 2.5 MILLIGRAM(S): 5 TABLET ORAL at 01:11

## 2024-02-16 RX ADMIN — OXYCODONE HYDROCHLORIDE 2.5 MILLIGRAM(S): 5 TABLET ORAL at 03:24

## 2024-02-16 RX ADMIN — OXYCODONE HYDROCHLORIDE 2.5 MILLIGRAM(S): 5 TABLET ORAL at 13:44

## 2024-02-16 RX ADMIN — OXYCODONE HYDROCHLORIDE 2.5 MILLIGRAM(S): 5 TABLET ORAL at 07:13

## 2024-02-16 RX ADMIN — LIDOCAINE 1 PATCH: 4 CREAM TOPICAL at 05:58

## 2024-02-16 RX ADMIN — PANTOPRAZOLE SODIUM 40 MILLIGRAM(S): 20 TABLET, DELAYED RELEASE ORAL at 05:48

## 2024-02-16 RX ADMIN — Medication 2: at 18:58

## 2024-02-16 RX ADMIN — ATORVASTATIN CALCIUM 80 MILLIGRAM(S): 80 TABLET, FILM COATED ORAL at 22:40

## 2024-02-16 RX ADMIN — OXYCODONE HYDROCHLORIDE 2.5 MILLIGRAM(S): 5 TABLET ORAL at 14:14

## 2024-02-16 RX ADMIN — Medication 50 MILLIGRAM(S): at 06:29

## 2024-02-16 RX ADMIN — OXYCODONE HYDROCHLORIDE 2.5 MILLIGRAM(S): 5 TABLET ORAL at 02:24

## 2024-02-16 RX ADMIN — CLOPIDOGREL BISULFATE 75 MILLIGRAM(S): 75 TABLET, FILM COATED ORAL at 22:44

## 2024-02-16 RX ADMIN — HUMAN INSULIN 10 UNIT(S): 100 INJECTION, SUSPENSION SUBCUTANEOUS at 06:29

## 2024-02-16 RX ADMIN — APIXABAN 5 MILLIGRAM(S): 2.5 TABLET, FILM COATED ORAL at 05:48

## 2024-02-16 RX ADMIN — OXYCODONE HYDROCHLORIDE 2.5 MILLIGRAM(S): 5 TABLET ORAL at 22:15

## 2024-02-16 RX ADMIN — Medication 8 UNIT(S): at 18:58

## 2024-02-16 RX ADMIN — GABAPENTIN 600 MILLIGRAM(S): 400 CAPSULE ORAL at 13:44

## 2024-02-16 RX ADMIN — OXYCODONE HYDROCHLORIDE 2.5 MILLIGRAM(S): 5 TABLET ORAL at 21:15

## 2024-02-16 RX ADMIN — HUMAN INSULIN 5 UNIT(S): 100 INJECTION, SUSPENSION SUBCUTANEOUS at 13:47

## 2024-02-16 RX ADMIN — Medication 975 MILLIGRAM(S): at 22:40

## 2024-02-16 RX ADMIN — Medication 975 MILLIGRAM(S): at 23:45

## 2024-02-16 RX ADMIN — Medication 975 MILLIGRAM(S): at 14:15

## 2024-02-16 RX ADMIN — Medication 8 UNIT(S): at 13:47

## 2024-02-16 RX ADMIN — Medication 975 MILLIGRAM(S): at 05:48

## 2024-02-16 RX ADMIN — PANTOPRAZOLE SODIUM 40 MILLIGRAM(S): 20 TABLET, DELAYED RELEASE ORAL at 18:43

## 2024-02-16 RX ADMIN — HUMAN INSULIN 5 UNIT(S): 100 INJECTION, SUSPENSION SUBCUTANEOUS at 23:18

## 2024-02-16 NOTE — PROGRESS NOTE ADULT - PROBLEM SELECTOR PLAN 1
Pressure like pain mostly in lower quadrants associated with nausea, NBNB vomiting, and increased ostomy output. 1 small episode of dark and bright red blood in output at home. Lipase negative, EKG without ischemic changes. CT unremarkable. Although lactate was elevated, it cleared with fluids and patient is comfortable appearing on exam, lower suspicion for acute mesenteric ischemia. DDx include gastroparesis (however pt having diarrhea and not reduced motility) vs chronic mesenteric ischemia. GYN consulted to r/o vaginal prolapse as cause of sx, however low suspcion at this time. GI and surgery consulted.  - reglan standing for nausea (qtc 436)  - tylenol 975mg Q8h standing, oxycodone 2.5mg q6 prn for severe pain  - avoiding NSAIDs due to bleeding from stoma  - plan to de-escalate opioid pain meds as likely contributing or worsening GI sx  - f/u pancreatic elastase, calprotectin, and stool electrolytes to calculate osmolar gap   - f/u GYN recs Pressure like pain mostly in lower quadrants associated with nausea, NBNB vomiting, and increased ostomy output. 1 small episode of dark and bright red blood in output at home. Lipase negative, EKG without ischemic changes. CT unremarkable. Although lactate was elevated, it cleared with fluids and patient is comfortable appearing on exam, lower suspicion for acute mesenteric ischemia. DDx include gastroparesis (however pt having diarrhea and not reduced motility) vs chronic mesenteric ischemia. GYN consulted to r/o vaginal prolapse as cause of sx, however low suspcion at this time and vaginal swabs w/ normal chrystal. GI and surgery consulted.  - reglan PRN for nausea (qtc 436)  - tylenol 975mg Q8h standing, oxycodone 2.5mg q6 prn for severe pain  - avoiding NSAIDs due to bleeding from stoma  - plan to de-escalate opioid pain meds   - f/u pancreatic elastase, calprotectin, and stool electrolytes to calculate osmolar gap   - f/u GYN recs

## 2024-02-16 NOTE — PROGRESS NOTE ADULT - SUBJECTIVE AND OBJECTIVE BOX
O/N Events: given additional loperamide and oxy 2.5 for pain, however abdominal pain unchanges. Tachy to 113 @6am.    Subjective/ROS: Patient seen and examined at bedside.     VITALS  Vital Signs Last 24 Hrs  T(C): 36.7 (16 Feb 2024 06:10), Max: 36.8 (15 Feb 2024 09:43)  T(F): 98.1 (16 Feb 2024 06:10), Max: 98.3 (15 Feb 2024 09:43)  HR: 113 (16 Feb 2024 06:10) (94 - 113)  BP: 151/81 (16 Feb 2024 06:10) (133/77 - 151/81)  BP(mean): --  RR: 18 (16 Feb 2024 06:10) (17 - 18)  SpO2: 97% (16 Feb 2024 06:10) (95% - 97%)    Parameters below as of 16 Feb 2024 06:10  Patient On (Oxygen Delivery Method): room air        CAPILLARY BLOOD GLUCOSE      POCT Blood Glucose.: 133 mg/dL (16 Feb 2024 06:18)  POCT Blood Glucose.: 90 mg/dL (16 Feb 2024 03:22)  POCT Blood Glucose.: 71 mg/dL (16 Feb 2024 02:59)  POCT Blood Glucose.: 57 mg/dL (16 Feb 2024 02:52)  POCT Blood Glucose.: 180 mg/dL (15 Feb 2024 22:22)  POCT Blood Glucose.: 243 mg/dL (15 Feb 2024 18:08)  POCT Blood Glucose.: 228 mg/dL (15 Feb 2024 11:24)  POCT Blood Glucose.: 336 mg/dL (15 Feb 2024 08:05)      PHYSICAL EXAM  General: NAD  Head: NC/AT; MMM; PERRL; EOMI;  Neck: Supple; no JVD  Respiratory: CTAB; no wheezes/rales/rhonchi  Cardiovascular: Regular rhythm/rate; S1/S2+, no murmurs, rubs gallops   Gastrointestinal: Soft; NTND; bowel sounds normal and present  Extremities: WWP; no edema/cyanosis  Neurological: A&Ox3, CNII-XII grossly intact; no obvious focal deficits    MEDICATIONS  (STANDING):  acetaminophen     Tablet .. 975 milliGRAM(s) Oral every 8 hours  apixaban 5 milliGRAM(s) Oral every 12 hours  atorvastatin 80 milliGRAM(s) Oral at bedtime  dextrose 5%. 1000 milliLiter(s) (50 mL/Hr) IV Continuous <Continuous>  dextrose 50% Injectable 25 Gram(s) IV Push once  gabapentin 600 milliGRAM(s) Oral every 8 hours  glucagon  Injectable 1 milliGRAM(s) IntraMuscular once  influenza   Vaccine 0.5 milliLiter(s) IntraMuscular once  insulin lispro (ADMELOG) corrective regimen sliding scale   SubCutaneous three times a day before meals  insulin lispro (ADMELOG) corrective regimen sliding scale   SubCutaneous at bedtime  insulin lispro Injectable (ADMELOG) 8 Unit(s) SubCutaneous three times a day before meals  insulin NPH human recombinant 10 Unit(s) SubCutaneous every 8 hours  lactobacillus acidophilus 1 Tablet(s) Oral daily  lidocaine   4% Patch 1 Patch Transdermal every 24 hours  metoprolol succinate ER 50 milliGRAM(s) Oral daily  nystatin Powder 1 Application(s) Topical two times a day  pantoprazole    Tablet 40 milliGRAM(s) Oral two times a day    MEDICATIONS  (PRN):  dextrose Oral Gel 15 Gram(s) Oral once PRN Blood Glucose LESS THAN 70 milliGRAM(s)/deciliter  loperamide 2 milliGRAM(s) Oral every 6 hours PRN Diarrhea  metoclopramide 5 milliGRAM(s) Oral every 8 hours PRN nausea, vomiting  oxyCODONE    IR 2.5 milliGRAM(s) Oral every 6 hours PRN Severe Pain (7 - 10)      penicillin (Hives)  vancomycin (Anaphylaxis; Hives)  shellfish. (Hives; Anaphylaxis)  clindamycin (Pruritus)  Bactrim I.V. (Rash (Mod to Severe))  fish (Hives; Urticaria)  iodine containing compounds (Hives; Anaphylaxis)  amoxicillin (Short breath; Rash)      LABS                        10.8   11.08 )-----------( 432      ( 15 Feb 2024 07:01 )             36.5     02-15    136  |  101  |  29<H>  ----------------------------<  372<H>  4.5   |  24  |  0.98    Ca    8.7      15 Feb 2024 07:01  Phos  3.0     02-15  Mg     1.8     02-15        Urinalysis Basic - ( 15 Feb 2024 07:01 )    Color: x / Appearance: x / SG: x / pH: x  Gluc: 372 mg/dL / Ketone: x  / Bili: x / Urobili: x   Blood: x / Protein: x / Nitrite: x   Leuk Esterase: x / RBC: x / WBC x   Sq Epi: x / Non Sq Epi: x / Bacteria: x              IMAGING/EKG/ETC   O/N Events: given additional loperamide and oxy 2.5 for pain, however abdominal exam unchanged. Tachy to 113 @6am.    Subjective/ROS: Patient seen and examined at bedside. Resting comfortably, states that she did not sleep all night and did not eat all day yesterday. Continues to be very frustrated, reports that her ostomy bag was changed once overnight, however she had required "20 towels" in less than an hour to clean the excess output. OJ bottle at bedside which is appr ~1/5 full. Pt drank juice and ate after becoming hypoglycemic overnight. States that ostomy output became worse with loperamide.    VITALS  Vital Signs Last 24 Hrs  T(C): 36.7 (16 Feb 2024 06:10), Max: 36.8 (15 Feb 2024 09:43)  T(F): 98.1 (16 Feb 2024 06:10), Max: 98.3 (15 Feb 2024 09:43)  HR: 113 (16 Feb 2024 06:10) (94 - 113)  BP: 151/81 (16 Feb 2024 06:10) (133/77 - 151/81)  BP(mean): --  RR: 18 (16 Feb 2024 06:10) (17 - 18)  SpO2: 97% (16 Feb 2024 06:10) (95% - 97%)    Parameters below as of 16 Feb 2024 06:10  Patient On (Oxygen Delivery Method): room air    CAPILLARY BLOOD GLUCOSE    POCT Blood Glucose.: 133 mg/dL (16 Feb 2024 06:18)  POCT Blood Glucose.: 90 mg/dL (16 Feb 2024 03:22)  POCT Blood Glucose.: 71 mg/dL (16 Feb 2024 02:59)  POCT Blood Glucose.: 57 mg/dL (16 Feb 2024 02:52)  POCT Blood Glucose.: 180 mg/dL (15 Feb 2024 22:22)  POCT Blood Glucose.: 243 mg/dL (15 Feb 2024 18:08)  POCT Blood Glucose.: 228 mg/dL (15 Feb 2024 11:24)  POCT Blood Glucose.: 336 mg/dL (15 Feb 2024 08:05)      PHYSICAL EXAM  General: NAD  Head: pupils reactive  Neck: Supple; no JVD  Respiratory: CTAB; no wheezes/rales/rhonchi  Cardiovascular: Regular rhythm/rate; S1/S2+, no murmurs, rubs gallops   Gastrointestinal: Soft; NTND; ostomy bag w/ surrounding erythema, half full with liquidy output and chunks  Extremities: WWP; no edema/cyanosis  Neurological: A&Ox3    MEDICATIONS  (STANDING):  acetaminophen     Tablet .. 975 milliGRAM(s) Oral every 8 hours  apixaban 5 milliGRAM(s) Oral every 12 hours  atorvastatin 80 milliGRAM(s) Oral at bedtime  dextrose 5%. 1000 milliLiter(s) (50 mL/Hr) IV Continuous <Continuous>  dextrose 50% Injectable 25 Gram(s) IV Push once  gabapentin 600 milliGRAM(s) Oral every 8 hours  glucagon  Injectable 1 milliGRAM(s) IntraMuscular once  influenza   Vaccine 0.5 milliLiter(s) IntraMuscular once  insulin lispro (ADMELOG) corrective regimen sliding scale   SubCutaneous three times a day before meals  insulin lispro (ADMELOG) corrective regimen sliding scale   SubCutaneous at bedtime  insulin lispro Injectable (ADMELOG) 8 Unit(s) SubCutaneous three times a day before meals  insulin NPH human recombinant 10 Unit(s) SubCutaneous every 8 hours  lactobacillus acidophilus 1 Tablet(s) Oral daily  lidocaine   4% Patch 1 Patch Transdermal every 24 hours  metoprolol succinate ER 50 milliGRAM(s) Oral daily  nystatin Powder 1 Application(s) Topical two times a day  pantoprazole    Tablet 40 milliGRAM(s) Oral two times a day    MEDICATIONS  (PRN):  dextrose Oral Gel 15 Gram(s) Oral once PRN Blood Glucose LESS THAN 70 milliGRAM(s)/deciliter  loperamide 2 milliGRAM(s) Oral every 6 hours PRN Diarrhea  metoclopramide 5 milliGRAM(s) Oral every 8 hours PRN nausea, vomiting  oxyCODONE    IR 2.5 milliGRAM(s) Oral every 6 hours PRN Severe Pain (7 - 10)      penicillin (Hives)  vancomycin (Anaphylaxis; Hives)  shellfish. (Hives; Anaphylaxis)  clindamycin (Pruritus)  Bactrim I.V. (Rash (Mod to Severe))  fish (Hives; Urticaria)  iodine containing compounds (Hives; Anaphylaxis)  amoxicillin (Short breath; Rash)      LABS                        10.8   11.08 )-----------( 432      ( 15 Feb 2024 07:01 )             36.5     02-15    136  |  101  |  29<H>  ----------------------------<  372<H>  4.5   |  24  |  0.98    Ca    8.7      15 Feb 2024 07:01  Phos  3.0     02-15  Mg     1.8     02-15        Urinalysis Basic - ( 15 Feb 2024 07:01 )    Color: x / Appearance: x / SG: x / pH: x  Gluc: 372 mg/dL / Ketone: x  / Bili: x / Urobili: x   Blood: x / Protein: x / Nitrite: x   Leuk Esterase: x / RBC: x / WBC x   Sq Epi: x / Non Sq Epi: x / Bacteria: x              IMAGING/EKG/ETC

## 2024-02-16 NOTE — PROGRESS NOTE ADULT - ASSESSMENT
43F w/ PMHx Asthma, CVA (11/2021; residual L>R weakness), PE (4/2023 on Eliquis), T2DM (on insulin), HTN, HLD with hx abdominal necrotizing fasciitis s/p diverting colostomy creation (11/2021; [facility]), CAD (on , 2 NAT in 6/23) with PSHx of numerous abdominal wall hernia repairs, who presents with increased colostomy output, dec PO intake, nausea and vomiting x2 weeks. Patient with multiple medical complaints, with unclear diagnosis at this time. High ileostomy output with loperamide    Recommend optimization of medical management of high output  Agree w/ GI/Endoscopic evaluation of GI bleed  Team 1c will continue to follow

## 2024-02-16 NOTE — PROGRESS NOTE ADULT - SUBJECTIVE AND OBJECTIVE BOX
SUBJECTIVE / INTERVAL HPI: Patient was seen and examined this morning. Continues to have output form ostomy with leakage, pain, and nausea. White count increased to 15 today. Surgery following. Start standing insulin yesterday for glucoses in 300s. She didn't eat anything yesterday. Hypoglycemia overnight with NPH 10 and treated with a lot of orange juice. Glucose has been stable since.      CAPILLARY BLOOD GLUCOSE & INSULIN RECEIVED  369 mg/dL (02-15 @ 06:59) - Lispro 10  372 mg/dL (02-15 @ 07:01)  336 mg/dL (02-15 @ 08:05)  228 mg/dL (02-15 @ 11:24) - Lispro 4  243 mg/dL (02-15 @ 18:08) - Lispro 8+4  180 mg/dL (02-15 @ 22:22) - NPH 10  57 mg/dL (02-16 @ 02:52)  71 mg/dL (02-16 @ 02:59)  90 mg/dL (02-16 @ 03:22)  133 mg/dL (02-16 @ 06:18) - NPH 10  145 mg/dL (02-16 @ 08:22) - Lispro 8. Ate cereal and banana.  147 mg/dL (02-16 @ 08:57)  189 mg/dL (02-16 @ 12:56) - NPH 5, lispro 8+2      REVIEW OF SYSTEMS  Constitutional:  Negative fever, chills or loss of appetite.  Eyes:  Negative blurry vision or double vision.  Cardiovascular:  Negative for chest pain or palpitations.  Respiratory:  Negative for cough, wheezing, or shortness of breath.    Gastrointestinal:  Negative for nausea, vomiting, diarrhea, constipation, or abdominal pain.  Genitourinary:  Negative frequency, urgency or dysuria.  Neurologic:  No headache, confusion, dizziness, lightheadedness.    PHYSICAL EXAM  Vital Signs Last 24 Hrs  T(C): 36.6 (16 Feb 2024 13:27), Max: 36.7 (16 Feb 2024 06:10)  T(F): 97.8 (16 Feb 2024 13:27), Max: 98.1 (16 Feb 2024 06:10)  HR: 98 (16 Feb 2024 13:27) (98 - 113)  BP: 122/79 (16 Feb 2024 13:27) (122/79 - 151/81)  BP(mean): 94 (16 Feb 2024 13:27) (94 - 94)  RR: 19 (16 Feb 2024 13:27) (18 - 19)  SpO2: 98% (16 Feb 2024 13:27) (97% - 98%)    Parameters below as of 16 Feb 2024 13:27  Patient On (Oxygen Delivery Method): room air      Constitutional: Awake, alert, in no acute distress. Obese  HEENT: Normocephalic, atraumatic, NAOMI.  Respiratory: Lungs clear to ausculation bilaterally.   Cardiovascular: regular rhythm, normal S1 and S2, no audible murmurs.   GI: soft, non-tender, non-distended, bowel sounds present. + ostomy  Extremities: No lower extremity edema.  Psychiatric: AAO x 3. Normal affect/mood.     LABS  CBC - WBC/HGB/HTC/PLT: 15.53/10.8/36.7/467 (02-16-24)  BMP - Na/K/Cl/Bicarb/BUN/Cr/Gluc/AG/eGFR: 137/4.3/104/20/17/0.95/145/13/76 (02-16-24)  Ca - 8.7 (02-16-24)  Phos - 2.4 (02-16-24)  Mg - 1.6 (02-16-24)  LFT - Alb/Tprot/Tbili/Dbili/AlkPhos/ALT/AST: 3.3/--/0.2/--/112/8/9 (02-16-24)  PT/aPTT/INR: 12.6/34.2/1.11 (02-16-24)   Thyroid Stimulating Hormone, Serum: 0.797 (12-31-23)      MEDICATIONS  MEDICATIONS  (STANDING):  acetaminophen     Tablet .. 975 milliGRAM(s) Oral every 8 hours  apixaban 5 milliGRAM(s) Oral every 12 hours  atorvastatin 80 milliGRAM(s) Oral at bedtime  dextrose 5%. 1000 milliLiter(s) (50 mL/Hr) IV Continuous <Continuous>  dextrose 50% Injectable 25 Gram(s) IV Push once  gabapentin 600 milliGRAM(s) Oral every 8 hours  glucagon  Injectable 1 milliGRAM(s) IntraMuscular once  influenza   Vaccine 0.5 milliLiter(s) IntraMuscular once  insulin lispro (ADMELOG) corrective regimen sliding scale   SubCutaneous three times a day before meals  insulin lispro (ADMELOG) corrective regimen sliding scale   SubCutaneous at bedtime  insulin lispro Injectable (ADMELOG) 8 Unit(s) SubCutaneous three times a day before meals  insulin NPH human recombinant 5 Unit(s) SubCutaneous every 8 hours  lactobacillus acidophilus 1 Tablet(s) Oral daily  lidocaine   4% Patch 1 Patch Transdermal every 24 hours  metoprolol succinate ER 50 milliGRAM(s) Oral daily  nystatin Powder 1 Application(s) Topical two times a day  pantoprazole    Tablet 40 milliGRAM(s) Oral two times a day  psyllium Powder 1 Packet(s) Oral two times a day    MEDICATIONS  (PRN):  dextrose Oral Gel 15 Gram(s) Oral once PRN Blood Glucose LESS THAN 70 milliGRAM(s)/deciliter  loperamide 2 milliGRAM(s) Oral every 6 hours PRN Diarrhea  metoclopramide 5 milliGRAM(s) Oral every 8 hours PRN nausea, vomiting  oxyCODONE    IR 2.5 milliGRAM(s) Oral every 6 hours PRN Severe Pain (7 - 10)

## 2024-02-16 NOTE — PROGRESS NOTE ADULT - PROBLEM SELECTOR PLAN 8
Last here in January with change in regimen to humulin 70U TID, admelog 40U TID before meals, metformin 1g qd. A1c last admission 16.4. Per endocrinology, pt was taking metformin leading up to admission.   - lispro 8u TID before meals  - NPH 8u TID  - discontinue metformin on DC, may be contributing to GI sx  - f/u endo recs Last here in January with change in regimen to humulin 70U TID, admelog 40U TID before meals, metformin 1g qd. A1c last admission 16.4. Per endocrinology, pt was taking metformin leading up to admission.   - lispro 8u TID before meals  - NPH 5u TID  - discontinue metformin on DC, may be contributing to GI sx  - f/u endo recs

## 2024-02-16 NOTE — ADVANCED PRACTICE NURSE CONSULT - REASON FOR CONSULT
ostomy issues    per chart review, 42 y/o F PMH CVA (residual L>R weakness), PE (on Eliquis), multiple abdominal hernia repairs, 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

## 2024-02-16 NOTE — PROGRESS NOTE ADULT - PROBLEM SELECTOR PLAN 4
Pt reporting 1 episode BRB in from stoma at home. Had additional episode 2/16 inpatient. Hgb 12/2023 ~12-13, now ~11. Pt reporting 1 episode BRB in from stoma at home. Had additional episode 2/16 inpatient. Hgb 12/2023 ~12-13, now ~11. Less likely active GIB as pt has remained hemodynamically stable this admission, per GI and surgery more likely superficial bleeding.  - avoiding NSAIDs  - Discontinued plavix due to bleeding (was taking x6 months 10d)  - cardiology recs re holding plavix

## 2024-02-16 NOTE — PROGRESS NOTE ADULT - PROBLEM SELECTOR PLAN 1
Type 2 diabetes mellitus   - C-peptide, insulin, and inuslin abs ordered.  - Decrease NPH 5 units every 8 hours (6A, 2P, 10P)  - Start lispro 8 units before meals.  - Continue lispro moderate dose sliding scale before meals and at bedtime.  - Patient's fingerstick glucose goal is 100-180 mg/dL.    - For discharge: Insulin dose TBD, stop ozempic due to c/f gastroparesis, stop metformin in setting of increase ostomy output.  - Patient can follow up at discharge with [facility] by calling [phone] to make an appointment.      Case discussed with Dr. Bowden. Primary team updated. Type 2 diabetes mellitus   - C-peptide, insulin, and inuslin abs ordered.  - Decrease NPH 5 units every 8 hours (6A, 2P, 10P)  - Continue lispro 8 units before meals.  - Continue lispro moderate dose sliding scale before meals and at bedtime.  - Patient's fingerstick glucose goal is 100-180 mg/dL.    - For discharge: Insulin dose TBD, stop ozempic due to c/f gastroparesis, stop metformin in setting of increase ostomy output.  - Patient can follow up at discharge with [facility] by calling [phone] to make an appointment.      Case discussed with Dr. Bowden. Primary team updated.

## 2024-02-16 NOTE — PROGRESS NOTE ADULT - ASSESSMENT
43F w/ PMHx Asthma, CVA (11/2021; residual L>R weakness), PE (4/2023 on Eliquis), T2DM (on insulin), HTN, HLD with hx abdominal necrotizing fasciitis s/p diverting colostomy creation (11/2021; [facility]), CAD (on , 2 NAT in 6/23) with PSHx of numerous abdominal wall hernia repairs, who presents with increased colostomy output, dec PO intake, nausea and vomiting x2 weeks. Patient with multiple medical complaints, with unclear diagnosis at this time. High ileostomy output could be secondary to drinking primarily liquids, GI viral illness, poor glucose control, etc. Recent bleeding from colostomy likely 2/2 friable skin surrounding stoma however cannot exclude internal source without endoscopy at this time.     Plan:  - Blood most likely from mucocutaneous border   - GI for EGD abd Colonoscopy   -Consider colostomy reversal once medically optimized   - Team 5c to continue to follow

## 2024-02-16 NOTE — ADVANCED PRACTICE NURSE CONSULT - ASSESSMENT
Pt independent of ostomy care but recent pouch leakage r/t liquid output    LLQ ostomy: stoma, red, flush, oval 1 3/8". Peristomal skin denuded with fungal rash extending approx 1cm from stoma. Output odorous and very thin.  Pt independent of ostomy care but recent pouch leakage r/t liquid output    LLQ ostomy: stoma, red, flush, oval 1 3/8". Peristomal skin denuded with fungal rash extending approx 1cm from stoma. Output odorous and very thin. No bleeding from peristomal skin or from mucosal during care.

## 2024-02-16 NOTE — ADVANCED PRACTICE NURSE CONSULT - RECOMMEDATIONS
Ostomy: cleanse with bath wipes, apply stoma powder and Nystatin powder to peristomal skin, seal with Cavilon. Rochelle Ostomy: cleanse with bath wipes, apply stoma powder and Nystatin powder to peristomal skin, seal with Cavilon. Cut wafer to pt's stoma size, remove plastic backing. Apply ring around hole, apply to skin. Attach pouch. Apply belt and barrier extenders as needed.    Education with patient, supplies at bedside.     Ostomy: cleanse with bath wipes, apply stoma powder and Nystatin powder to peristomal skin, seal with Cavilon. Cut wafer to pt's stoma size, remove plastic backing. Apply ring around hole, apply to skin. Attach pouch. Apply belt and barrier extenders as needed.    Utilized Jose Armando 2 piece 3/4" pouch. Provided pt with [id number] and educated on use to help decrease odor.     Education with patient, supplies at bedside.

## 2024-02-16 NOTE — PROGRESS NOTE ADULT - PROBLEM SELECTOR PLAN 3
Suspect that this is in setting of eating less solids and continuing with her home insulin regimen. Holding insulin for now and will reach out to endocrine with help in her regimen.  - started D5LR @70cc/hr given hypoglycemia, now discontinued  - lispro 8u TID before meals  - NPH 8u TID  - c/w mISS  - f/u endo recs  - diet education Suspect that this is in setting of eating less solids and continuing with her home insulin regimen. Holding insulin for now and will reach out to endocrine with help in her regimen. Most recent hypoglycemic episode 2/16 @3am after receiving 10u NPH @10pm  - started D5LR @70cc/hr given hypoglycemia, now discontinued  - lispro 8u TID before meals  - NPH 5u TID  - c/w mISS  - f/u endo recs  - diet education

## 2024-02-16 NOTE — PROGRESS NOTE ADULT - SUBJECTIVE AND OBJECTIVE BOX
SUBJECTIVE: Pt seen and examined at bedside this am by surgery team. Having ostomy output.     MEDICATIONS  (STANDING):  acetaminophen     Tablet .. 975 milliGRAM(s) Oral every 8 hours  apixaban 5 milliGRAM(s) Oral every 12 hours  atorvastatin 80 milliGRAM(s) Oral at bedtime  clopidogrel Tablet 75 milliGRAM(s) Oral every 24 hours  dextrose 5%. 1000 milliLiter(s) (50 mL/Hr) IV Continuous <Continuous>  dextrose 50% Injectable 25 Gram(s) IV Push once  gabapentin 600 milliGRAM(s) Oral every 8 hours  glucagon  Injectable 1 milliGRAM(s) IntraMuscular once  influenza   Vaccine 0.5 milliLiter(s) IntraMuscular once  insulin lispro (ADMELOG) corrective regimen sliding scale   SubCutaneous three times a day before meals  insulin lispro (ADMELOG) corrective regimen sliding scale   SubCutaneous at bedtime  insulin lispro Injectable (ADMELOG) 8 Unit(s) SubCutaneous three times a day before meals  insulin NPH human recombinant 5 Unit(s) SubCutaneous every 8 hours  lactobacillus acidophilus 1 Tablet(s) Oral daily  lidocaine   4% Patch 1 Patch Transdermal every 24 hours  metoprolol succinate ER 50 milliGRAM(s) Oral daily  nystatin Powder 1 Application(s) Topical two times a day  pantoprazole    Tablet 40 milliGRAM(s) Oral two times a day  psyllium Powder 1 Packet(s) Oral two times a day    MEDICATIONS  (PRN):  dextrose Oral Gel 15 Gram(s) Oral once PRN Blood Glucose LESS THAN 70 milliGRAM(s)/deciliter  loperamide 2 milliGRAM(s) Oral every 6 hours PRN Diarrhea  metoclopramide 5 milliGRAM(s) Oral every 8 hours PRN nausea, vomiting  oxyCODONE    IR 2.5 milliGRAM(s) Oral every 6 hours PRN Severe Pain (7 - 10)      Vital Signs Last 24 Hrs  T(C): 36.6 (16 Feb 2024 13:27), Max: 36.7 (16 Feb 2024 06:10)  T(F): 97.8 (16 Feb 2024 13:27), Max: 98.1 (16 Feb 2024 06:10)  HR: 98 (16 Feb 2024 13:27) (98 - 113)  BP: 122/79 (16 Feb 2024 13:27) (122/79 - 151/81)  BP(mean): 94 (16 Feb 2024 13:27) (94 - 94)  RR: 19 (16 Feb 2024 13:27) (18 - 19)  SpO2: 98% (16 Feb 2024 13:27) (97% - 98%)    Parameters below as of 16 Feb 2024 13:27  Patient On (Oxygen Delivery Method): room air      Physical Exam:   General: AAOx3, NAD, WDWN, laying comfortably in bed  Cardio: S1,S2, No MRG, RRR  Pulm: Lungs bilaterally clear to auscultation  Abdomen: soft, mildly ttp throughout. Colostomy p/p/p with output and gas with friable skin surrounding the stoma. There is a large parastomal hernia.  Extremities: WWP, peripheral pulses appreciated        I&O's Detail    15 Feb 2024 07:01  -  16 Feb 2024 07:00  --------------------------------------------------------  IN:  Total IN: 0 mL    OUT:    Colostomy (mL): 375 mL  Total OUT: 375 mL    Total NET: -375 mL      16 Feb 2024 07:01  -  16 Feb 2024 17:51  --------------------------------------------------------  IN:  Total IN: 0 mL    OUT:    Colostomy (mL): 150 mL  Total OUT: 150 mL    Total NET: -150 mL          LABS:                        10.8   15.53 )-----------( 467      ( 16 Feb 2024 08:22 )             36.7     02-16    137  |  104  |  17  ----------------------------<  145<H>  4.3   |  20<L>  |  0.95    Ca    8.7      16 Feb 2024 08:22  Phos  2.4     02-16  Mg     1.6     02-16    TPro  7.3  /  Alb  3.3  /  TBili  0.2  /  DBili  x   /  AST  9<L>  /  ALT  8<L>  /  AlkPhos  112  02-16    PT/INR - ( 16 Feb 2024 08:22 )   PT: 12.6 sec;   INR: 1.11          PTT - ( 16 Feb 2024 08:22 )  PTT:34.2 sec  Urinalysis Basic - ( 16 Feb 2024 08:22 )    Color: x / Appearance: x / SG: x / pH: x  Gluc: 145 mg/dL / Ketone: x  / Bili: x / Urobili: x   Blood: x / Protein: x / Nitrite: x   Leuk Esterase: x / RBC: x / WBC x   Sq Epi: x / Non Sq Epi: x / Bacteria: x        RADIOLOGY & ADDITIONAL STUDIES:

## 2024-02-16 NOTE — PROGRESS NOTE ADULT - ASSESSMENT
43F PMH CVA (L weakness), PE (Eliquis), multiple abdominal hernia repairs (Dr. Hurtado), abdominal nec fasc s/p Brandie's w/ ostomy, DM (A1c 10.8, previously 16), CAD w/ stent (4/2023), BMI 35, admitted for increased ostomy output x4-5d and hypoglycemia. Surgery consulted for large parastomal hernia, CT on admission showing large parastomal hernia, possibly compressing stoma, however patient having output on ostomy.  Will defer parastomal hernia repair at this time.     Recommendations     - No surgical intervention at this time for parastomal hernia  - Rest of care as per primary team   - Plan discussed with Dr Hollingsworth   - Team 4C will stop actively following the patient

## 2024-02-16 NOTE — PROGRESS NOTE ADULT - ASSESSMENT
43y Female with type 2 insulin-requiring DM, markedly insulin-resistant on U-500 insulin, admitted with hypoglycemia secondary to N/V for the past 24 hours.    Glucose now rising. Not eating meals, but some high glycemic foods at bedside. Labile glucose likely from long half life of U-500 v gastroparesis v insulin binding antibodies v reactive hypoglycemia (continues to have good amount of endogenous insulin, c peptide 9.7)    Advised her to watch her dexcom, and if SG is 100 with down arrow, to treat with a mixed snack like yogurt parfait, instead of waiting until it is <70 and overtreating with juice which can cause more osmotic changes and can be contributing to ostomy output, and also increased risk for reactive hypoglycemia as she is making a lot of endogenous insulin that will respond to glucose (juice) bolus.    A1C: 10.8 % (16.4 % six weeks ago)  C-peptide 9.7 (May 2023)  BUN: 17  Creatinine: 0.95  GFR: 76  Weight: 101.2  BMI: 34.9

## 2024-02-16 NOTE — PROGRESS NOTE ADULT - SUBJECTIVE AND OBJECTIVE BOX
INTERVAL HPI/OVERNIGHT EVENTS: Continue to have high out ostomy function as well as bleeding      SUBJECTIVE: Evaluated at the bedside. States continues to have high out from osotmy, changed 6xs. Noted bleeding. Denies nausea, emesis, cp, sob.    apixaban 5 milliGRAM(s) Oral every 12 hours  metoprolol succinate ER 50 milliGRAM(s) Oral daily      Vital Signs Last 24 Hrs  T(C): 36.7 (16 Feb 2024 06:10), Max: 36.7 (16 Feb 2024 06:10)  T(F): 98.1 (16 Feb 2024 06:10), Max: 98.1 (16 Feb 2024 06:10)  HR: 113 (16 Feb 2024 06:10) (99 - 113)  BP: 151/81 (16 Feb 2024 06:10) (133/77 - 151/81)  BP(mean): --  RR: 18 (16 Feb 2024 06:10) (17 - 18)  SpO2: 97% (16 Feb 2024 06:10) (95% - 97%)    Parameters below as of 16 Feb 2024 06:10  Patient On (Oxygen Delivery Method): room air      I&O's Detail    15 Feb 2024 07:01  -  16 Feb 2024 07:00  --------------------------------------------------------  IN:  Total IN: 0 mL    OUT:    Colostomy (mL): 375 mL  Total OUT: 375 mL    Total NET: -375 mL          Physical Exam  General: NAD, resting comfortably in bed  Abd: soft, non-tender, non-distended, noted parastomal hernia, ostomy pink w/ noted friable tissue, no active bleed, liquid stool in bag.   Extrem: WWP, no edema,    LABS:                        10.8   15.53 )-----------( 467      ( 16 Feb 2024 08:22 )             36.7     02-16    137  |  104  |  17  ----------------------------<  145<H>  4.3   |  20<L>  |  0.95    Ca    8.7      16 Feb 2024 08:22  Phos  2.4     02-16  Mg     1.6     02-16    TPro  7.3  /  Alb  3.3  /  TBili  0.2  /  DBili  x   /  AST  9<L>  /  ALT  8<L>  /  AlkPhos  112  02-16    PT/INR - ( 16 Feb 2024 08:22 )   PT: 12.6 sec;   INR: 1.11          PTT - ( 16 Feb 2024 08:22 )  PTT:34.2 sec  Urinalysis Basic - ( 16 Feb 2024 08:22 )    Color: x / Appearance: x / SG: x / pH: x  Gluc: 145 mg/dL / Ketone: x  / Bili: x / Urobili: x   Blood: x / Protein: x / Nitrite: x   Leuk Esterase: x / RBC: x / WBC x   Sq Epi: x / Non Sq Epi: x / Bacteria: x        RADIOLOGY & ADDITIONAL STUDIES:

## 2024-02-16 NOTE — PROGRESS NOTE ADULT - ASSESSMENT
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 42 y/o F PMH CVA (residual L>R weakness), PE (on Eliquis), multiple abdominal hernia repairs, nec fasc of abdomen s/o corbin procedure w ostomy, uncontrolled DM,  CAD with multiple stents (last in August 2023) presenting with abd pain, n/v, increased ostomy output, and low blood sugar found to have hypokalemia and hypoglycemia, admitted to [facility] for further management.

## 2024-02-16 NOTE — PROGRESS NOTE ADULT - SUBJECTIVE AND OBJECTIVE BOX
SUBJECTIVE: Patient seen and examined bedside by Surgical resident. Pt states that she is still having poor PO intake with +n/-v and no solid PO intake today. was able to urinate 3x timday. Ambulating to the bathroom without issue. blood sugars remain elevated but better controlled. Changed bag at least 3 times today, worked with ostomy nursing again today    apixaban 5 milliGRAM(s) Oral every 12 hours  clopidogrel Tablet 75 milliGRAM(s) Oral every 24 hours  metoprolol succinate ER 50 milliGRAM(s) Oral daily    MEDICATIONS  (PRN):  dextrose Oral Gel 15 Gram(s) Oral once PRN Blood Glucose LESS THAN 70 milliGRAM(s)/deciliter  loperamide 2 milliGRAM(s) Oral every 6 hours PRN Diarrhea  metoclopramide 5 milliGRAM(s) Oral every 8 hours PRN nausea, vomiting  oxyCODONE    IR 2.5 milliGRAM(s) Oral every 6 hours PRN Severe Pain (7 - 10)      I&O's Detail    15 Feb 2024 07:01  -  16 Feb 2024 07:00  --------------------------------------------------------  IN:  Total IN: 0 mL    OUT:    Colostomy (mL): 375 mL  Total OUT: 375 mL    Total NET: -375 mL      16 Feb 2024 07:01  -  16 Feb 2024 18:00  --------------------------------------------------------  IN:  Total IN: 0 mL    OUT:    Colostomy (mL): 150 mL  Total OUT: 150 mL    Total NET: -150 mL          Vital Signs Last 24 Hrs  T(C): 36.6 (16 Feb 2024 13:27), Max: 36.7 (16 Feb 2024 06:10)  T(F): 97.8 (16 Feb 2024 13:27), Max: 98.1 (16 Feb 2024 06:10)  HR: 98 (16 Feb 2024 13:27) (98 - 113)  BP: 122/79 (16 Feb 2024 13:27) (122/79 - 151/81)  BP(mean): 94 (16 Feb 2024 13:27) (94 - 94)  RR: 19 (16 Feb 2024 13:27) (18 - 19)  SpO2: 98% (16 Feb 2024 13:27) (97% - 98%)    Parameters below as of 16 Feb 2024 13:27  Patient On (Oxygen Delivery Method): room air          General: NAD, resting comfortably in bed  C/V: NSR  Pulm: Nonlabored breathing, no respiratory distress  Abd: soft, mild diffusely tender to palpation. left of midline ostomy, present, patient and productive with brown green thick liquid output, no blood present on inspection.   Extrem: WWP, no edema, SCDs in place    LABS:                        10.8   15.53 )-----------( 467      ( 16 Feb 2024 08:22 )             36.7     02-16    137  |  104  |  17  ----------------------------<  145<H>  4.3   |  20<L>  |  0.95    Ca    8.7      16 Feb 2024 08:22  Phos  2.4     02-16  Mg     1.6     02-16    TPro  7.3  /  Alb  3.3  /  TBili  0.2  /  DBili  x   /  AST  9<L>  /  ALT  8<L>  /  AlkPhos  112  02-16    PT/INR - ( 16 Feb 2024 08:22 )   PT: 12.6 sec;   INR: 1.11          PTT - ( 16 Feb 2024 08:22 )  PTT:34.2 sec  Urinalysis Basic - ( 16 Feb 2024 08:22 )    Color: x / Appearance: x / SG: x / pH: x  Gluc: 145 mg/dL / Ketone: x  / Bili: x / Urobili: x   Blood: x / Protein: x / Nitrite: x   Leuk Esterase: x / RBC: x / WBC x   Sq Epi: x / Non Sq Epi: x / Bacteria: x        RADIOLOGY & ADDITIONAL STUDIES:

## 2024-02-16 NOTE — PROGRESS NOTE ADULT - PROBLEM SELECTOR PLAN 5
Initially p/w tachycardia, leukocytosis, and elevated lactate without clear source of infection. Infectious workup negative. Lactate cleared with fluids. CTAP noncon unremarkable, prior CT abd/pelvis in Dec 2023 with IV contrast without fluid collection or necrotizing fasiciits at that time however it showed left adnexal mass likely hemorrhagic cyst and they recommended nonemergent pelvis US.   - Will hold abx for now without clear source of infection.  - Will f/u pancreatic elastase and calprotectin stool Initially p/w tachycardia, leukocytosis, and elevated lactate without clear source of infection. Infectious workup negative. Lactate cleared with fluids. CTAP noncon unremarkable, prior CT abd/pelvis in Dec 2023 with IV contrast without fluid collection or necrotizing fasiciits at that time however it showed left adnexal mass likely hemorrhagic cyst and they recommended nonemergent pelvis US.   - Will hold abx for now without clear source of infection.  - f/u pancreatic elastase and calprotectin stool

## 2024-02-17 LAB
ANION GAP SERPL CALC-SCNC: 11 MMOL/L — SIGNIFICANT CHANGE UP (ref 5–17)
BASOPHILS # BLD AUTO: 0.03 K/UL — SIGNIFICANT CHANGE UP (ref 0–0.2)
BASOPHILS NFR BLD AUTO: 0.3 % — SIGNIFICANT CHANGE UP (ref 0–2)
BUN SERPL-MCNC: 15 MG/DL — SIGNIFICANT CHANGE UP (ref 7–23)
CALCIUM SERPL-MCNC: 8.5 MG/DL — SIGNIFICANT CHANGE UP (ref 8.4–10.5)
CHLORIDE SERPL-SCNC: 101 MMOL/L — SIGNIFICANT CHANGE UP (ref 96–108)
CO2 SERPL-SCNC: 21 MMOL/L — LOW (ref 22–31)
CREAT SERPL-MCNC: 0.75 MG/DL — SIGNIFICANT CHANGE UP (ref 0.5–1.3)
EGFR: 101 ML/MIN/1.73M2 — SIGNIFICANT CHANGE UP
EOSINOPHIL # BLD AUTO: 1.44 K/UL — HIGH (ref 0–0.5)
EOSINOPHIL NFR BLD AUTO: 12.9 % — HIGH (ref 0–6)
GLUCOSE BLDC GLUCOMTR-MCNC: 147 MG/DL — HIGH (ref 70–99)
GLUCOSE BLDC GLUCOMTR-MCNC: 152 MG/DL — HIGH (ref 70–99)
GLUCOSE BLDC GLUCOMTR-MCNC: 232 MG/DL — HIGH (ref 70–99)
GLUCOSE BLDC GLUCOMTR-MCNC: 320 MG/DL — HIGH (ref 70–99)
GLUCOSE BLDC GLUCOMTR-MCNC: 363 MG/DL — HIGH (ref 70–99)
GLUCOSE SERPL-MCNC: 307 MG/DL — HIGH (ref 70–99)
HCT VFR BLD CALC: 37.2 % — SIGNIFICANT CHANGE UP (ref 34.5–45)
HGB BLD-MCNC: 10.6 G/DL — LOW (ref 11.5–15.5)
IMM GRANULOCYTES NFR BLD AUTO: 0.5 % — SIGNIFICANT CHANGE UP (ref 0–0.9)
LYMPHOCYTES # BLD AUTO: 2.87 K/UL — SIGNIFICANT CHANGE UP (ref 1–3.3)
LYMPHOCYTES # BLD AUTO: 25.7 % — SIGNIFICANT CHANGE UP (ref 13–44)
MAGNESIUM SERPL-MCNC: 1.5 MG/DL — LOW (ref 1.6–2.6)
MCHC RBC-ENTMCNC: 22 PG — LOW (ref 27–34)
MCHC RBC-ENTMCNC: 28.5 GM/DL — LOW (ref 32–36)
MCV RBC AUTO: 77.3 FL — LOW (ref 80–100)
MONOCYTES # BLD AUTO: 0.77 K/UL — SIGNIFICANT CHANGE UP (ref 0–0.9)
MONOCYTES NFR BLD AUTO: 6.9 % — SIGNIFICANT CHANGE UP (ref 2–14)
NEUTROPHILS # BLD AUTO: 5.99 K/UL — SIGNIFICANT CHANGE UP (ref 1.8–7.4)
NEUTROPHILS NFR BLD AUTO: 53.7 % — SIGNIFICANT CHANGE UP (ref 43–77)
NRBC # BLD: 0 /100 WBCS — SIGNIFICANT CHANGE UP (ref 0–0)
PHOSPHATE SERPL-MCNC: 2.5 MG/DL — SIGNIFICANT CHANGE UP (ref 2.5–4.5)
PLATELET # BLD AUTO: 435 K/UL — HIGH (ref 150–400)
POTASSIUM SERPL-MCNC: 4 MMOL/L — SIGNIFICANT CHANGE UP (ref 3.5–5.3)
POTASSIUM SERPL-SCNC: 4 MMOL/L — SIGNIFICANT CHANGE UP (ref 3.5–5.3)
RBC # BLD: 4.81 M/UL — SIGNIFICANT CHANGE UP (ref 3.8–5.2)
RBC # FLD: 16 % — HIGH (ref 10.3–14.5)
SODIUM SERPL-SCNC: 133 MMOL/L — LOW (ref 135–145)
WBC # BLD: 11.16 K/UL — HIGH (ref 3.8–10.5)
WBC # FLD AUTO: 11.16 K/UL — HIGH (ref 3.8–10.5)

## 2024-02-17 PROCEDURE — 99232 SBSQ HOSP IP/OBS MODERATE 35: CPT

## 2024-02-17 PROCEDURE — 99233 SBSQ HOSP IP/OBS HIGH 50: CPT | Mod: GC

## 2024-02-17 RX ADMIN — NYSTATIN CREAM 1 APPLICATION(S): 100000 CREAM TOPICAL at 17:57

## 2024-02-17 RX ADMIN — APIXABAN 5 MILLIGRAM(S): 2.5 TABLET, FILM COATED ORAL at 17:56

## 2024-02-17 RX ADMIN — OXYCODONE HYDROCHLORIDE 2.5 MILLIGRAM(S): 5 TABLET ORAL at 10:08

## 2024-02-17 RX ADMIN — OXYCODONE HYDROCHLORIDE 2.5 MILLIGRAM(S): 5 TABLET ORAL at 09:08

## 2024-02-17 RX ADMIN — PANTOPRAZOLE SODIUM 40 MILLIGRAM(S): 20 TABLET, DELAYED RELEASE ORAL at 17:56

## 2024-02-17 RX ADMIN — Medication 4: at 09:40

## 2024-02-17 RX ADMIN — GABAPENTIN 600 MILLIGRAM(S): 400 CAPSULE ORAL at 07:03

## 2024-02-17 RX ADMIN — HUMAN INSULIN 5 UNIT(S): 100 INJECTION, SUSPENSION SUBCUTANEOUS at 13:31

## 2024-02-17 RX ADMIN — Medication 8 UNIT(S): at 18:06

## 2024-02-17 RX ADMIN — ATORVASTATIN CALCIUM 80 MILLIGRAM(S): 80 TABLET, FILM COATED ORAL at 22:17

## 2024-02-17 RX ADMIN — PANTOPRAZOLE SODIUM 40 MILLIGRAM(S): 20 TABLET, DELAYED RELEASE ORAL at 07:03

## 2024-02-17 RX ADMIN — APIXABAN 5 MILLIGRAM(S): 2.5 TABLET, FILM COATED ORAL at 07:03

## 2024-02-17 RX ADMIN — Medication 8 UNIT(S): at 13:31

## 2024-02-17 RX ADMIN — Medication 10: at 18:06

## 2024-02-17 RX ADMIN — CLOPIDOGREL BISULFATE 75 MILLIGRAM(S): 75 TABLET, FILM COATED ORAL at 22:17

## 2024-02-17 RX ADMIN — HUMAN INSULIN 5 UNIT(S): 100 INJECTION, SUSPENSION SUBCUTANEOUS at 07:04

## 2024-02-17 RX ADMIN — NYSTATIN CREAM 1 APPLICATION(S): 100000 CREAM TOPICAL at 07:04

## 2024-02-17 RX ADMIN — Medication 50 MILLIGRAM(S): at 07:04

## 2024-02-17 RX ADMIN — Medication 8 UNIT(S): at 09:40

## 2024-02-17 RX ADMIN — HUMAN INSULIN 5 UNIT(S): 100 INJECTION, SUSPENSION SUBCUTANEOUS at 22:29

## 2024-02-17 NOTE — PRE-OP CHECKLIST - LAST TOOK
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       NEUROLOGY  #CVA  - prior CVA x3 with no residual deficits  - C/w Aspirin 81 mg QD    #Myoclonic jerks  - on Keppra 500 mg BID, per neuro wishing to wean however per family request will continue at 500 mg BID, but now off valproate per neuro  - holding home gabapentin and memantine in setting of somnolence  - continue lexapro     #AMS  - CT/CTA negative for stroke. EEG now off   - 2/8 MRI brain - unremarkable findings. Family now amenable to Trach/PEG.     CARDIOVASCULAR  #HTN  - home amlodipine and metoprolol on hold  - Midodrine 30 q8h, wean as tolerated. Off pressors 2/16     #CAD  - on aspirin, holding Brilinta and ranolazine  - Need to restart brilinta ASAP after Trach/PEG, last stent 2022    #Afib  - pt with known history of pAF, first observed 2/1  - can consider starting AC and/or rate control if persists     RESPIRATORY  #COVID  - s/p paxlovid and 1 dose remdesivir while inpatient    #Pleural effusions b/l  - CT chest from 1/16 showing new small bilateral pleural effusions/ atelectasis/ patchy bilateral ground-glass opacities are consistent with pulmonary edema.  - Diuresis PRN based on amount of pleural effusions on POCUS    #AHRF  - Intubated for airway protection in s/o AMS, now trach on 2/13 -  - Holding brilinta for possible procedure. Will need to restart ASAP after trach/PEG  - s/p zosyn for aspiration PNA from 1/20- 1/28   - MRSA Swab positive for Staph Aureus only, started on Mupirocin (2/13 - )  - Restarted on Zosyn (2/12 - ). BAL culture negative  - Hypertonic saline 4 mL Q6H, dueonebs 3 mL q6H    GI  #UGIB  - s/p EGD 1/2/24 showing dieulafoy lesion and esophagitis likely 2/2 NGT irritation s/p 2 clips  - On Protonix IV BID     #Acute Cholecystitis   - 12/26 Distended gallbladder with cholelithiasis and sludge  - 12/29 HIDA scan positive for Acute Lynsey   - S/p Zosyn (12/24 - 12/31)  - 1/26 s/p percutaneous cholecystostomy tube placement by IR     #Nutrition   - tube feeds NGT   - Discussed w/ family, want PEG.   - Trach 2/13, per GI PEG only after afebrile x 48 hours. Next eval would be Tuesday, 2/20.     RENAL  #CKD3  - CTM renal function   - on Cardura   - TOV 2/16    INFECTIOUS DISEASE  #COVID   - s/p paxlovid and 1 dose remdesivir    # PNA  - 1/16 CT chest showing ground glass opacities  - s/p zosyn   - intermittent episodes of fevers, likely source GI given choleycystitis? culture NGTD  - meropenem 5d course thru (1/28 - 2/1)  - newly hypothermic on 2/11, on Zosyn (2/12 - ), unclear source, work-up unrevealing, plan for 7-day course.  - RVP negative 2/11, Blood cultures 2/11 negative, UA 2/11 negative  - 2/13 Bronch studies NGTD     HEME/ VASCULAR  #Anemia  - CTM CBC daily     #Mesenteric ischemia  - CTA demonstrating mesenteric fat stranding associated with ascending/transverse colon.   - 12/24 s/p SMA angiography with stent placement with diagnostic laparoscopy, small bowel and visible colon viable, some inflammation of omentum in RUQ.  - On ASA, Brillinta currently on hold pending PEG eval. Need to resume after procedure.     #DVT PPX  -  Q8H    ENDOCRINE  #DM2  - A1c 9.3   - on lantus 12U and q6 SSI    LINES/ TUBES  - NGT  - Moss plan for TOV 2/16   - cholecystostomy tube 1/26     ETHICS/ GOC    - Trach/ eventual PEG    DISPO: TBD      For Follow-Up:  [  ] Complete Zosyn for empiric course x7 days (2/12-2/19)   [  ] NPO at [facility] on Monday, 1/19 for PEG. GI may place if pt afebrile 48 hrs. Stop heparin SubQ beforehand.   [  ] Restart Brilinta and Heparin SubQ given pt's hx of stents (Last 5/2022) once PEG placed  [  ] f/u TOV done on 2/16  [  ] Diuresis PRN to keep net even  [  ] Reach out to IR once PEG placed re: pulling cholecystostomy tube  [  ] Continue BID protonix dosing due to prior GIB. 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       NEUROLOGY  #CVA  - prior CVA x3 with no residual deficits  - C/w Aspirin 81 mg QD    #Myoclonic jerks  - on Keppra 500 mg BID, per neuro wishing to wean however per family request will continue at 500 mg BID, but now off valproate per neuro  - holding home gabapentin and memantine in setting of somnolence  - continue lexapro     #AMS  - CT/CTA negative for stroke. EEG now off   - 2/8 MRI brain - unremarkable findings. Family now amenable to Trach/PEG.     CARDIOVASCULAR  #HTN  - home amlodipine and metoprolol on hold  - Midodrine 30 q8h, wean as tolerated. Off pressors 2/16     #CAD  - on aspirin, holding Brilinta and ranolazine  - Need to restart brilinta ASAP after Trach/PEG, last stent 2022    #Afib  - pt with known history of pAF, first observed 2/1  - can consider starting AC and/or rate control if persists     RESPIRATORY  #COVID  - s/p paxlovid and 1 dose remdesivir while inpatient    #Pleural effusions b/l  - CT chest from 1/16 showing new small bilateral pleural effusions/ atelectasis/ patchy bilateral ground-glass opacities are consistent with pulmonary edema.  - Diuresis PRN based on amount of pleural effusions on POCUS    #AHRF  - Intubated for airway protection in s/o AMS, now trach on 2/13 -  - Holding brilinta for possible procedure. Will need to restart ASAP after trach/PEG  - s/p zosyn for aspiration PNA from 1/20- 1/28   - MRSA Swab positive for Staph Aureus only, started on Mupirocin (2/13 - )  - Restarted on Zosyn (2/12 - ). BAL culture negative  - Hypertonic saline 4 mL Q6H, dueonebs 3 mL q6H    GI  #UGIB  - s/p EGD 1/2/24 showing dieulafoy lesion and esophagitis likely 2/2 NGT irritation s/p 2 clips  - On Protonix IV BID     #Acute Cholecystitis   - 12/26 Distended gallbladder with cholelithiasis and sludge  - 12/29 HIDA scan positive for Acute Lynsey   - S/p Zosyn (12/24 - 12/31)  - 1/26 s/p percutaneous cholecystostomy tube placement by IR     #Nutrition   - tube feeds NGT   - Discussed w/ family, want PEG.   - Trach 2/13, per GI PEG only after afebrile x 48 hours. Next eval would be Tuesday, 2/20.     RENAL  #CKD3  - CTM renal function   - on Cardura   - Passed TOV 2/16    INFECTIOUS DISEASE  #COVID   - s/p paxlovid and 1 dose remdesivir    # PNA  - 1/16 CT chest showing ground glass opacities  - s/p zosyn   - intermittent episodes of fevers, likely source GI given choleycystitis? culture NGTD  - meropenem 5d course thru (1/28 - 2/1)  - newly hypothermic on 2/11, on Zosyn (2/12 - ), unclear source, work-up unrevealing, plan for 7-day course.  - RVP negative 2/11, Blood cultures 2/11 negative, UA 2/11 negative  - 2/13 Bronch studies NGTD     HEME/ VASCULAR  #Anemia  - CTM CBC daily     #Mesenteric ischemia  - CTA demonstrating mesenteric fat stranding associated with ascending/transverse colon.   - 12/24 s/p SMA angiography with stent placement with diagnostic laparoscopy, small bowel and visible colon viable, some inflammation of omentum in RUQ.  - On ASA, Brillinta currently on hold pending PEG eval. Need to resume after procedure.     #DVT PPX  -  Q8H    ENDOCRINE  #DM2  - A1c 9.3   - on lantus 12U and q6 SSI    LINES/ TUBES  - NGT  - cholecystostomy tube 1/26     ETHICS/ GOC    - Trach/ eventual PEG    DISPO: [email or web address]

## 2024-02-17 NOTE — PROGRESS NOTE ADULT - PROBLEM SELECTOR PLAN 3
Suspect that this is in setting of eating less solids and continuing with her home insulin regimen. Holding insulin for now and will reach out to endocrine with help in her regimen. Most recent hypoglycemic episode 2/16 @3am after receiving 10u NPH @10pm  - lispro 8u TID before meals  - NPH 5u TID  - c/w mISS  - f/u endo recs  - diet education

## 2024-02-17 NOTE — PROGRESS NOTE ADULT - PROBLEM SELECTOR PLAN 4
Pt reporting 1 episode BRB in from stoma at home. Had additional episode 2/16 inpatient. Hgb 12/2023 ~12-13, now ~11. Less likely active GIB as pt has remained hemodynamically stable this admission, per GI and surgery more likely superficial bleeding. No indication for EGD/colonoscopy this admission.  - avoiding NSAIDs  - restarted plavix

## 2024-02-17 NOTE — PROGRESS NOTE ADULT - PROBLEM SELECTOR PLAN 5
Initially p/w tachycardia, leukocytosis, and elevated lactate without clear source of infection. Infectious workup negative. Lactate cleared with fluids. CTAP noncon unremarkable, prior CT abd/pelvis in Dec 2023 with IV contrast without fluid collection or necrotizing fasiciits at that time however it showed left adnexal mass likely hemorrhagic cyst and they recommended nonemergent pelvis US.   - Will hold abx for now without clear source of infection.  - f/u pancreatic elastase and calprotectin stool  - GI PCR negative x2

## 2024-02-17 NOTE — PROGRESS NOTE ADULT - ASSESSMENT
43F w/ PMHx Asthma, CVA (11/2021; residual L>R weakness), PE (4/2023 on Eliquis), T2DM (on insulin), HTN, HLD with hx abdominal necrotizing fasciitis s/p diverting colostomy creation (11/2021; [facility]), CAD (on , 2 NAT in 6/23) with PSHx of numerous abdominal wall hernia repairs, who presents with increased colostomy output, dec PO intake, nausea and vomiting x2 weeks. Patient with multiple medical complaints, with unclear diagnosis at this time. High ileostomy output could be secondary to drinking primarily liquids, GI viral illness, poor glucose control, etc. Recent bleeding from colostomy likely 2/2 friable skin surrounding stoma however cannot exclude internal source without endoscopy at this time.     Recommendations:   - Improving bloody ostomy output most likely from mucocutaneous border   - F/up GI for EGD abd Colonoscopy   -Consider colostomy reversal once medically optimized   - Team 5c to continue to follow   43F w/ PMHx Asthma, CVA (11/2021; residual L>R weakness), PE (4/2023 on Eliquis), T2DM (on insulin), HTN, HLD with hx abdominal necrotizing fasciitis s/p diverting colostomy creation (11/2021; [facility]), CAD (on , 2 NAT in 6/23) with PSHx of numerous abdominal wall hernia repairs, who presents with increased colostomy output, dec PO intake, nausea and vomiting x2 weeks. Patient with multiple medical complaints, with unclear diagnosis at this time. High ileostomy output could be secondary to drinking primarily liquids, GI viral illness, poor glucose control, etc. Recent bleeding from colostomy likely 2/2 friable skin surrounding stoma however cannot exclude internal source without endoscopy at this time.     Recommendations:   - Improving bloody ostomy output most likely from mucocutaneous border   - F/up GI for EGD abd Colonoscopy   -Consider colostomy reversal once medically optimized   - Surgery to continue to follow    Plans discussed with on call attending

## 2024-02-17 NOTE — PROGRESS NOTE ADULT - PROBLEM SELECTOR PLAN 8
Last here in January with change in regimen to humulin 70U TID, admelog 40U TID before meals, metformin 1g qd. A1c last admission 16.4. Per endocrinology, pt was taking metformin leading up to admission.   - lispro 8u TID before meals  - NPH 5u TID  - discontinue metformin on DC, may be contributing to GI sx  - f/u endo recs

## 2024-02-17 NOTE — PROGRESS NOTE ADULT - PROBLEM SELECTOR PLAN 2
Increased watery output from ostomy, associated with switching to liquids i/s/o nausea. Suspect that watery output is from having mostly liquids over the past few days vs osmolar shifts due to changes in glucose. Abd with mild tenderness in lower quadrants without guarding or rebound. GI PCR negative x2 and c diff negative. Pt refusing primary team interventions.  - encourage solid PO intake  - imodium prn for diarrhea, unlikely infectious etiology  - metamucil BID  - f/u surgery recs  - f/u stool osmolar gap  - monitor ostomy output (goal 1-1.2L)  - education on diet

## 2024-02-17 NOTE — PROGRESS NOTE ADULT - ASSESSMENT
42 y/o F PMH CVA (residual L>R weakness), PE (on Eliquis), multiple abdominal hernia repairs, nec fasc of abdomen s/o corbin procedure w ostomy, uncontrolled DM,  CAD with multiple stents (last in August 2023) presenting with abd pain, n/v, increased ostomy output, and low blood sugar found to have hypokalemia and hypoglycemia, admitted to [email or web address] for further management.

## 2024-02-17 NOTE — PROGRESS NOTE ADULT - PROBLEM SELECTOR PLAN 1
Pressure like pain mostly in lower quadrants associated with nausea, NBNB vomiting, and increased ostomy output. 1 small episode of dark and bright red blood in output at home. Lipase negative, EKG without ischemic changes. CT unremarkable. Although lactate was elevated, it cleared with fluids and patient is comfortable appearing on exam, lower suspicion for acute mesenteric ischemia. DDx include gastroparesis (however pt having diarrhea and not reduced motility) vs chronic mesenteric ischemia. GYN consulted to r/o vaginal prolapse as cause of sx, however low suspcion at this time and vaginal swabs w/ normal chrystal. GI and surgery consulted.  - reglan PRN for nausea (qtc 436)  - tylenol 975mg Q8h standing  - discontinued oxycodone  - avoiding NSAIDs due to bleeding from stoma  - f/u pancreatic elastase, calprotectin, and stool electrolytes to calculate osmolar gap

## 2024-02-17 NOTE — PROGRESS NOTE ADULT - SUBJECTIVE AND OBJECTIVE BOX
O/N Events: Ostomy output ~1.2L    Subjective/ROS: Patient seen and examined at bedside. Reports refusing metamucil, states it would make her nauseous. Tolerated diet yesterday, said she had salad for dinner. Reports being very frustrated with primary team, would not answer any questions regarding pain.    VITALS  Vital Signs Last 24 Hrs  T(C): 36.7 (17 Feb 2024 12:27), Max: 37.2 (16 Feb 2024 20:37)  T(F): 98 (17 Feb 2024 12:27), Max: 99 (16 Feb 2024 20:37)  HR: 91 (17 Feb 2024 12:27) (91 - 100)  BP: 114/71 (17 Feb 2024 12:27) (114/71 - 129/83)  BP(mean): --  RR: 18 (17 Feb 2024 12:27) (17 - 18)  SpO2: 97% (17 Feb 2024 12:27) (96% - 97%)    Parameters below as of 17 Feb 2024 12:27  Patient On (Oxygen Delivery Method): room air        CAPILLARY BLOOD GLUCOSE      POCT Blood Glucose.: 363 mg/dL (17 Feb 2024 17:52)  POCT Blood Glucose.: 147 mg/dL (17 Feb 2024 12:49)  POCT Blood Glucose.: 232 mg/dL (17 Feb 2024 09:02)  POCT Blood Glucose.: 320 mg/dL (17 Feb 2024 07:02)  POCT Blood Glucose.: 202 mg/dL (16 Feb 2024 23:16)  POCT Blood Glucose.: 187 mg/dL (16 Feb 2024 22:08)  POCT Blood Glucose.: 193 mg/dL (16 Feb 2024 18:55)      PHYSICAL EXAM  General: NAD  Head: pupils reactive  Neck: Supple; no JVD  Respiratory: CTAB; no wheezes/rales/rhonchi  Cardiovascular: Regular rhythm/rate; S1/S2+  Gastrointestinal: Soft; NTND  Extremities: WWP  Neurological: A&Ox3    MEDICATIONS  (STANDING):  acetaminophen     Tablet .. 975 milliGRAM(s) Oral every 8 hours  apixaban 5 milliGRAM(s) Oral every 12 hours  atorvastatin 80 milliGRAM(s) Oral at bedtime  clopidogrel Tablet 75 milliGRAM(s) Oral every 24 hours  dextrose 5%. 1000 milliLiter(s) (50 mL/Hr) IV Continuous <Continuous>  dextrose 50% Injectable 25 Gram(s) IV Push once  gabapentin 600 milliGRAM(s) Oral every 8 hours  glucagon  Injectable 1 milliGRAM(s) IntraMuscular once  influenza   Vaccine 0.5 milliLiter(s) IntraMuscular once  insulin lispro (ADMELOG) corrective regimen sliding scale   SubCutaneous three times a day before meals  insulin lispro (ADMELOG) corrective regimen sliding scale   SubCutaneous at bedtime  insulin lispro Injectable (ADMELOG) 8 Unit(s) SubCutaneous three times a day before meals  insulin NPH human recombinant 5 Unit(s) SubCutaneous every 8 hours  lactobacillus acidophilus 1 Tablet(s) Oral daily  lidocaine   4% Patch 1 Patch Transdermal every 24 hours  metoprolol succinate ER 50 milliGRAM(s) Oral daily  nystatin Powder 1 Application(s) Topical two times a day  pantoprazole    Tablet 40 milliGRAM(s) Oral two times a day  psyllium Powder 1 Packet(s) Oral two times a day    MEDICATIONS  (PRN):  dextrose Oral Gel 15 Gram(s) Oral once PRN Blood Glucose LESS THAN 70 milliGRAM(s)/deciliter  loperamide 2 milliGRAM(s) Oral every 6 hours PRN Diarrhea  metoclopramide 5 milliGRAM(s) Oral every 8 hours PRN nausea, vomiting  oxyCODONE    IR 2.5 milliGRAM(s) Oral every 6 hours PRN Severe Pain (7 - 10)      penicillin (Hives)  vancomycin (Anaphylaxis; Hives)  shellfish. (Hives; Anaphylaxis)  clindamycin (Pruritus)  Bactrim I.V. (Rash (Mod to Severe))  fish (Hives; Urticaria)  iodine containing compounds (Hives; Anaphylaxis)  amoxicillin (Short breath; Rash)      LABS                        10.6   11.16 )-----------( 435      ( 17 Feb 2024 05:30 )             37.2     02-17    133<L>  |  101  |  15  ----------------------------<  307<H>  4.0   |  21<L>  |  0.75    Ca    8.5      17 Feb 2024 05:30  Phos  2.5     02-17  Mg     1.5     02-17    TPro  7.3  /  Alb  3.3  /  TBili  0.2  /  DBili  x   /  AST  9<L>  /  ALT  8<L>  /  AlkPhos  112  02-16    PT/INR - ( 16 Feb 2024 08:22 )   PT: 12.6 sec;   INR: 1.11          PTT - ( 16 Feb 2024 08:22 )  PTT:34.2 sec  Urinalysis Basic - ( 17 Feb 2024 05:30 )    Color: x / Appearance: x / SG: x / pH: x  Gluc: 307 mg/dL / Ketone: x  / Bili: x / Urobili: x   Blood: x / Protein: x / Nitrite: x   Leuk Esterase: x / RBC: x / WBC x   Sq Epi: x / Non Sq Epi: x / Bacteria: x              IMAGING/EKG/ETC

## 2024-02-18 LAB
ANION GAP SERPL CALC-SCNC: 11 MMOL/L — SIGNIFICANT CHANGE UP (ref 5–17)
BASOPHILS # BLD AUTO: 0.03 K/UL — SIGNIFICANT CHANGE UP (ref 0–0.2)
BASOPHILS NFR BLD AUTO: 0.2 % — SIGNIFICANT CHANGE UP (ref 0–2)
BUN SERPL-MCNC: 15 MG/DL — SIGNIFICANT CHANGE UP (ref 7–23)
CALCIUM SERPL-MCNC: 8.6 MG/DL — SIGNIFICANT CHANGE UP (ref 8.4–10.5)
CHLORIDE SERPL-SCNC: 102 MMOL/L — SIGNIFICANT CHANGE UP (ref 96–108)
CO2 SERPL-SCNC: 24 MMOL/L — SIGNIFICANT CHANGE UP (ref 22–31)
CREAT SERPL-MCNC: 0.72 MG/DL — SIGNIFICANT CHANGE UP (ref 0.5–1.3)
EGFR: 106 ML/MIN/1.73M2 — SIGNIFICANT CHANGE UP
EOSINOPHIL # BLD AUTO: 1.85 K/UL — HIGH (ref 0–0.5)
EOSINOPHIL NFR BLD AUTO: 14 % — HIGH (ref 0–6)
GLUCOSE BLDC GLUCOMTR-MCNC: 134 MG/DL — HIGH (ref 70–99)
GLUCOSE BLDC GLUCOMTR-MCNC: 158 MG/DL — HIGH (ref 70–99)
GLUCOSE BLDC GLUCOMTR-MCNC: 203 MG/DL — HIGH (ref 70–99)
GLUCOSE BLDC GLUCOMTR-MCNC: 274 MG/DL — HIGH (ref 70–99)
GLUCOSE BLDC GLUCOMTR-MCNC: 309 MG/DL — HIGH (ref 70–99)
GLUCOSE BLDC GLUCOMTR-MCNC: 57 MG/DL — LOW (ref 70–99)
GLUCOSE SERPL-MCNC: 140 MG/DL — HIGH (ref 70–99)
HCT VFR BLD CALC: 36.7 % — SIGNIFICANT CHANGE UP (ref 34.5–45)
HGB BLD-MCNC: 10.9 G/DL — LOW (ref 11.5–15.5)
IMM GRANULOCYTES NFR BLD AUTO: 0.5 % — SIGNIFICANT CHANGE UP (ref 0–0.9)
LYMPHOCYTES # BLD AUTO: 25.3 % — SIGNIFICANT CHANGE UP (ref 13–44)
LYMPHOCYTES # BLD AUTO: 3.34 K/UL — HIGH (ref 1–3.3)
MAGNESIUM SERPL-MCNC: 1.5 MG/DL — LOW (ref 1.6–2.6)
MCHC RBC-ENTMCNC: 22.1 PG — LOW (ref 27–34)
MCHC RBC-ENTMCNC: 29.7 GM/DL — LOW (ref 32–36)
MCV RBC AUTO: 74.3 FL — LOW (ref 80–100)
MONOCYTES # BLD AUTO: 0.85 K/UL — SIGNIFICANT CHANGE UP (ref 0–0.9)
MONOCYTES NFR BLD AUTO: 6.4 % — SIGNIFICANT CHANGE UP (ref 2–14)
NEUTROPHILS # BLD AUTO: 7.05 K/UL — SIGNIFICANT CHANGE UP (ref 1.8–7.4)
NEUTROPHILS NFR BLD AUTO: 53.6 % — SIGNIFICANT CHANGE UP (ref 43–77)
NRBC # BLD: 0 /100 WBCS — SIGNIFICANT CHANGE UP (ref 0–0)
PHOSPHATE SERPL-MCNC: 3.2 MG/DL — SIGNIFICANT CHANGE UP (ref 2.5–4.5)
PLATELET # BLD AUTO: 448 K/UL — HIGH (ref 150–400)
POTASSIUM SERPL-MCNC: 3.7 MMOL/L — SIGNIFICANT CHANGE UP (ref 3.5–5.3)
POTASSIUM SERPL-SCNC: 3.7 MMOL/L — SIGNIFICANT CHANGE UP (ref 3.5–5.3)
RBC # BLD: 4.94 M/UL — SIGNIFICANT CHANGE UP (ref 3.8–5.2)
RBC # FLD: 15.8 % — HIGH (ref 10.3–14.5)
SODIUM SERPL-SCNC: 137 MMOL/L — SIGNIFICANT CHANGE UP (ref 135–145)
WBC # BLD: 13.19 K/UL — HIGH (ref 3.8–10.5)
WBC # FLD AUTO: 13.19 K/UL — HIGH (ref 3.8–10.5)

## 2024-02-18 PROCEDURE — 99231 SBSQ HOSP IP/OBS SF/LOW 25: CPT

## 2024-02-18 PROCEDURE — 99233 SBSQ HOSP IP/OBS HIGH 50: CPT | Mod: GC

## 2024-02-18 RX ORDER — MAGNESIUM SULFATE 500 MG/ML
2 VIAL (ML) INJECTION ONCE
Refills: 0 | Status: COMPLETED | OUTPATIENT
Start: 2024-02-18 | End: 2024-02-18

## 2024-02-18 RX ORDER — DEXTROSE 50 % IN WATER 50 %
50 SYRINGE (ML) INTRAVENOUS ONCE
Refills: 0 | Status: COMPLETED | OUTPATIENT
Start: 2024-02-18 | End: 2024-02-18

## 2024-02-18 RX ORDER — NORTRIPTYLINE HYDROCHLORIDE 10 MG/1
10 CAPSULE ORAL AT BEDTIME
Refills: 0 | Status: DISCONTINUED | OUTPATIENT
Start: 2024-02-18 | End: 2024-02-20

## 2024-02-18 RX ADMIN — HUMAN INSULIN 5 UNIT(S): 100 INJECTION, SUSPENSION SUBCUTANEOUS at 22:35

## 2024-02-18 RX ADMIN — Medication 975 MILLIGRAM(S): at 21:49

## 2024-02-18 RX ADMIN — Medication 50 MILLILITER(S): at 00:25

## 2024-02-18 RX ADMIN — Medication 2: at 09:10

## 2024-02-18 RX ADMIN — APIXABAN 5 MILLIGRAM(S): 2.5 TABLET, FILM COATED ORAL at 17:55

## 2024-02-18 RX ADMIN — PANTOPRAZOLE SODIUM 40 MILLIGRAM(S): 20 TABLET, DELAYED RELEASE ORAL at 17:54

## 2024-02-18 RX ADMIN — Medication 8 UNIT(S): at 09:11

## 2024-02-18 RX ADMIN — Medication 1 TABLET(S): at 09:12

## 2024-02-18 RX ADMIN — NYSTATIN CREAM 1 APPLICATION(S): 100000 CREAM TOPICAL at 07:00

## 2024-02-18 RX ADMIN — APIXABAN 5 MILLIGRAM(S): 2.5 TABLET, FILM COATED ORAL at 07:00

## 2024-02-18 RX ADMIN — GABAPENTIN 600 MILLIGRAM(S): 400 CAPSULE ORAL at 21:50

## 2024-02-18 RX ADMIN — HUMAN INSULIN 5 UNIT(S): 100 INJECTION, SUSPENSION SUBCUTANEOUS at 08:24

## 2024-02-18 RX ADMIN — Medication 25 GRAM(S): at 12:15

## 2024-02-18 RX ADMIN — Medication 4: at 12:45

## 2024-02-18 RX ADMIN — ATORVASTATIN CALCIUM 80 MILLIGRAM(S): 80 TABLET, FILM COATED ORAL at 21:50

## 2024-02-18 RX ADMIN — NORTRIPTYLINE HYDROCHLORIDE 10 MILLIGRAM(S): 10 CAPSULE ORAL at 21:51

## 2024-02-18 RX ADMIN — Medication 8: at 18:14

## 2024-02-18 RX ADMIN — HUMAN INSULIN 5 UNIT(S): 100 INJECTION, SUSPENSION SUBCUTANEOUS at 14:57

## 2024-02-18 RX ADMIN — Medication 975 MILLIGRAM(S): at 22:22

## 2024-02-18 RX ADMIN — Medication 50 MILLIGRAM(S): at 07:00

## 2024-02-18 RX ADMIN — PANTOPRAZOLE SODIUM 40 MILLIGRAM(S): 20 TABLET, DELAYED RELEASE ORAL at 07:00

## 2024-02-18 RX ADMIN — Medication 8 UNIT(S): at 12:45

## 2024-02-18 RX ADMIN — Medication 2: at 22:36

## 2024-02-18 RX ADMIN — Medication 8 UNIT(S): at 18:14

## 2024-02-18 RX ADMIN — NYSTATIN CREAM 1 APPLICATION(S): 100000 CREAM TOPICAL at 17:55

## 2024-02-18 RX ADMIN — CLOPIDOGREL BISULFATE 75 MILLIGRAM(S): 75 TABLET, FILM COATED ORAL at 21:50

## 2024-02-18 NOTE — PROGRESS NOTE ADULT - ASSESSMENT
Diabetes with widely fluctuating sugars.  I recommend changing sliding scale coverage to low dose (from medium) so that she will get less lispro for high sugars and lispro dose should be held if she is not eating (pt advised she should not get lispro if she is not going to eat).

## 2024-02-18 NOTE — PROGRESS NOTE ADULT - SUBJECTIVE AND OBJECTIVE BOX
INTERVAL HPI/OVERNIGHT EVENTS:    Patient is a 43y old  Female who presents with a chief complaint of ABDOMINAL PAINHYPOKALEMIA  Hypoglycemia last night noted, glucose 57.  She had hypoglycemia symptoms.  Glucose was 363, correction given and she also did not eat dinner due to nausea.  She reports that sugars increase when she is under stress, pain, and at home, her sugars fluctuate widely.  yesterday glucose:  320, 232, 147, 363, 152, 57, 134  today  158  regimen:  NPH 5 units q 8hours, lispro 8 units TID  this is a lot less than she was requiring at home (U500, 70 units q8h, lipro 40 units)    MEDICATIONS  (STANDING):  acetaminophen     Tablet .. 975 milliGRAM(s) Oral every 8 hours  apixaban 5 milliGRAM(s) Oral every 12 hours  atorvastatin 80 milliGRAM(s) Oral at bedtime  clopidogrel Tablet 75 milliGRAM(s) Oral every 24 hours  dextrose 5%. 1000 milliLiter(s) (50 mL/Hr) IV Continuous <Continuous>  dextrose 50% Injectable 25 Gram(s) IV Push once  gabapentin 600 milliGRAM(s) Oral every 8 hours  glucagon  Injectable 1 milliGRAM(s) IntraMuscular once  influenza   Vaccine 0.5 milliLiter(s) IntraMuscular once  insulin lispro (ADMELOG) corrective regimen sliding scale   SubCutaneous three times a day before meals  insulin lispro (ADMELOG) corrective regimen sliding scale   SubCutaneous at bedtime  insulin lispro Injectable (ADMELOG) 8 Unit(s) SubCutaneous three times a day before meals  insulin NPH human recombinant 5 Unit(s) SubCutaneous every 8 hours  lactobacillus acidophilus 1 Tablet(s) Oral daily  lidocaine   4% Patch 1 Patch Transdermal every 24 hours  metoprolol succinate ER 50 milliGRAM(s) Oral daily  nystatin Powder 1 Application(s) Topical two times a day  pantoprazole    Tablet 40 milliGRAM(s) Oral two times a day  psyllium Powder 1 Packet(s) Oral two times a day    MEDICATIONS  (PRN):  dextrose Oral Gel 15 Gram(s) Oral once PRN Blood Glucose LESS THAN 70 milliGRAM(s)/deciliter  loperamide 2 milliGRAM(s) Oral every 6 hours PRN Diarrhea  metoclopramide 5 milliGRAM(s) Oral every 8 hours PRN nausea, vomiting      PHYSICAL EXAM  Vital Signs Last 24 Hrs  T(C): 36.3 (18 Feb 2024 06:42), Max: 36.3 (17 Feb 2024 20:10)  T(F): 97.4 (18 Feb 2024 06:42), Max: 97.4 (17 Feb 2024 20:10)  HR: 96 (18 Feb 2024 06:42) (96 - 107)  BP: 135/81 (18 Feb 2024 06:42) (118/74 - 135/81)  BP(mean): 89 (17 Feb 2024 20:10) (89 - 89)  RR: 18 (18 Feb 2024 06:42) (18 - 18)  SpO2: 97% (18 Feb 2024 06:42) (97% - 98%)    Parameters below as of 18 Feb 2024 06:42  Patient On (Oxygen Delivery Method): room air      Constitutional: wn/wd in NAD.   Psychiatric: AAO x 3, normal affect/mood.    LABS:                        10.9   13.19 )-----------( 448      ( 18 Feb 2024 08:03 )             36.7     02-18    137  |  102  |  15  ----------------------------<  140<H>  3.7   |  24  |  0.72    Ca    8.6      18 Feb 2024 08:03  Phos  3.2     02-18  Mg     1.5     02-18      Thyroid Stimulating Hormone, Serum: 0.797 uIU/mL (12-31 @ 12:46)  Thyroid Stimulating Hormone, Serum: 0.305 uIU/mL (06-21 @ 07:24)  Thyroid Stimulating Hormone, Serum: 0.851 uIU/mL (04-28 @ 05:24)    POCT Blood Glucose.: 203 mg/dL (18 Feb 2024 12:27)  POCT Blood Glucose.: 158 mg/dL (18 Feb 2024 08:14)  POCT Blood Glucose.: 134 mg/dL (18 Feb 2024 00:49)  POCT Blood Glucose.: 57 mg/dL (18 Feb 2024 00:14)  POCT Blood Glucose.: 152 mg/dL (17 Feb 2024 22:11)  POCT Blood Glucose.: 363 mg/dL (17 Feb 2024 17:52)  POCT Blood Glucose.: 147 mg/dL (17 Feb 2024 12:49)

## 2024-02-18 NOTE — PROGRESS NOTE ADULT - ASSESSMENT
43F w CVA (L>R weakness), PE (4/2023) on eliquis), IDDM2, CAD (NAT in 6/2023), HTN, HLD, Abdominal nec fasciitis s/p diverting colostomy creation 11/2021 at [facility] p/w N/V w increased ostomy output w increased abdominal pain, GI PCR and C diff negative, CT A/P wo acute process, suspect possible fluctuating glucose levels leading to N/V and abdominal pain w possible gastroparesis like process - ambulating, tolerating soup wo N/V - ostomy output 1150h/24h    #Abdominal pain - ddx includes gastroparesis. Less likely infectious process as GI PCR, C Diff has been negative. CT A/P   #High output ostomy - this appears to have resolved. Output 1150h over 24h over last 2 days. GI PCR + C diff negative on admission. Repeat GI PCR also negative. Last use of loperamide 2/15.    #DM2 - glucose . Had episode of hypoglycemia 57 0014h today. Endocrine following   - on NPH 5u q8h, Lispro 8u AC, SSI  #CAD s/p PCI - on plavix, toprol 50 daily  #Prior DVT - on eliquis 5 BID  #Chronic neuropathy - home on gabapentin 600mg q8h  #Normocytic anemia - hgb 10.9. was 11.7 on admission.    #Obesity - BMI 34. Affects all aspects of care    Plan  --Have had extensive discussions w patient regarding pain control and explaining efforts to steer away from opioids due to possibility of gastroparesis. Pt has been able to tolerate soup wo N/V and has been able to ambulate. Noted pt today declined tylenol and gabapentin today. Will continue to . On exam today - stool is yellow-brown wo blood noted.   --Would discuss w GI regarding trial nortriptyline 10mg for abdominal pain of gastroparesis. Noted last dose of metoclopramide PRN was given 2/14  --EKG for QTc    Appreciate GI recs - would discuss alternatives in treatment of abdominal pain and whether plan to perform EGD/COLO this admission.  Noted Surgery recs - agree pt would need optimization of sugars and DM prior to consideration of reversal procedure  F/U Endocrine recs - pt has had fluctuating blood sugars made more challenging due to N/V and intermittent dietary non-adherence    DISPO: Possible dc in 24-48h. Appreciate consultant input.

## 2024-02-18 NOTE — PROGRESS NOTE ADULT - SUBJECTIVE AND OBJECTIVE BOX
INTERVAL HPI/OVERNIGHT EVENTS: rubén o/n    SUBJECTIVE: Patient seen and examined at bedside.   Pt tolerated some soup yesterday and today wo nausea or vomiting. Found to be standing and walking today. Reports abdomina pain worsened after oxycodone stopped. Re-iterated to patient opiates can worsen gastroparesis symptoms.  Pt also showed photo of some blood oozing from ostomy - unable to determine due to resolution if from mucosa or from inside ostomy itself.     I/O: ostomy output yesterday at 1150cc. Today yellow formed stool w some liquid present in ostomy bag.     OBJECTIVE:    VITAL SIGNS:  ICU Vital Signs Last 24 Hrs  T(C): 36.9 (18 Feb 2024 13:14), Max: 36.9 (18 Feb 2024 13:14)  T(F): 98.5 (18 Feb 2024 13:14), Max: 98.5 (18 Feb 2024 13:14)  HR: 92 (18 Feb 2024 13:14) (92 - 107)  BP: 140/80 (18 Feb 2024 13:14) (118/74 - 140/80)  BP(mean): 89 (17 Feb 2024 20:10) (89 - 89)  ABP: --  ABP(mean): --  RR: 18 (18 Feb 2024 13:14) (18 - 18)  SpO2: 98% (18 Feb 2024 13:14) (97% - 98%)    O2 Parameters below as of 18 Feb 2024 13:14  Patient On (Oxygen Delivery Method): room air              CAPILLARY BLOOD GLUCOSE      POCT Blood Glucose.: 203 mg/dL (18 Feb 2024 12:27)      PHYSICAL EXAM:  GEN: female in NAD on RA  HEENT: NC/AT, MMM  CV: RRR, nml S1S2, no murmurs  PULM: nml effort, CTAB  ABD: Soft, large abdomen, ostomy present w yellow brown stool. mildly tender diffusely but wo rebound or guarding. NABS.   NEURO  A/O x3, moving all extremities, Sensation intact  PSYCH: Appropriate      MEDICATIONS:  MEDICATIONS  (STANDING):  acetaminophen     Tablet .. 975 milliGRAM(s) Oral every 8 hours  apixaban 5 milliGRAM(s) Oral every 12 hours  atorvastatin 80 milliGRAM(s) Oral at bedtime  clopidogrel Tablet 75 milliGRAM(s) Oral every 24 hours  dextrose 5%. 1000 milliLiter(s) (50 mL/Hr) IV Continuous <Continuous>  dextrose 50% Injectable 25 Gram(s) IV Push once  gabapentin 600 milliGRAM(s) Oral every 8 hours  glucagon  Injectable 1 milliGRAM(s) IntraMuscular once  influenza   Vaccine 0.5 milliLiter(s) IntraMuscular once  insulin lispro (ADMELOG) corrective regimen sliding scale   SubCutaneous three times a day before meals  insulin lispro (ADMELOG) corrective regimen sliding scale   SubCutaneous at bedtime  insulin lispro Injectable (ADMELOG) 8 Unit(s) SubCutaneous three times a day before meals  insulin NPH human recombinant 5 Unit(s) SubCutaneous every 8 hours  lactobacillus acidophilus 1 Tablet(s) Oral daily  lidocaine   4% Patch 1 Patch Transdermal every 24 hours  metoprolol succinate ER 50 milliGRAM(s) Oral daily  nystatin Powder 1 Application(s) Topical two times a day  pantoprazole    Tablet 40 milliGRAM(s) Oral two times a day  psyllium Powder 1 Packet(s) Oral two times a day    MEDICATIONS  (PRN):  dextrose Oral Gel 15 Gram(s) Oral once PRN Blood Glucose LESS THAN 70 milliGRAM(s)/deciliter  loperamide 2 milliGRAM(s) Oral every 6 hours PRN Diarrhea  metoclopramide 5 milliGRAM(s) Oral every 8 hours PRN nausea, vomiting      ALLERGIES:  Allergies    penicillin (Hives)  vancomycin (Anaphylaxis; Hives)  shellfish. (Hives; Anaphylaxis)  clindamycin (Pruritus)  fish (Hives; Urticaria)  iodine containing compounds (Hives; Anaphylaxis)  amoxicillin (Short breath; Rash)    Intolerances    Bactrim I.V. (Rash (Mod to Severe))      LABS:                        10.9   13.19 )-----------( 448      ( 18 Feb 2024 08:03 )             36.7     02-18    137  |  102  |  15  ----------------------------<  140<H>  3.7   |  24  |  0.72    Ca    8.6      18 Feb 2024 08:03  Phos  3.2     02-18  Mg     1.5     02-18        Urinalysis Basic - ( 18 Feb 2024 08:03 )    Color: x / Appearance: x / SG: x / pH: x  Gluc: 140 mg/dL / Ketone: x  / Bili: x / Urobili: x   Blood: x / Protein: x / Nitrite: x   Leuk Esterase: x / RBC: x / WBC x   Sq Epi: x / Non Sq Epi: x / Bacteria: x        RADIOLOGY & ADDITIONAL TESTS: Reviewed.

## 2024-02-19 LAB
ANION GAP SERPL CALC-SCNC: 9 MMOL/L — SIGNIFICANT CHANGE UP (ref 5–17)
BASOPHILS # BLD AUTO: 0.02 K/UL — SIGNIFICANT CHANGE UP (ref 0–0.2)
BASOPHILS NFR BLD AUTO: 0.2 % — SIGNIFICANT CHANGE UP (ref 0–2)
BUN SERPL-MCNC: 21 MG/DL — SIGNIFICANT CHANGE UP (ref 7–23)
CALCIUM SERPL-MCNC: 9.1 MG/DL — SIGNIFICANT CHANGE UP (ref 8.4–10.5)
CHLORIDE SERPL-SCNC: 104 MMOL/L — SIGNIFICANT CHANGE UP (ref 96–108)
CO2 SERPL-SCNC: 25 MMOL/L — SIGNIFICANT CHANGE UP (ref 22–31)
CREAT SERPL-MCNC: 0.81 MG/DL — SIGNIFICANT CHANGE UP (ref 0.5–1.3)
EGFR: 92 ML/MIN/1.73M2 — SIGNIFICANT CHANGE UP
ELASTASE PANC STL-MCNT: 345 CD:794062645 — SIGNIFICANT CHANGE UP
EOSINOPHIL # BLD AUTO: 1.14 K/UL — HIGH (ref 0–0.5)
EOSINOPHIL NFR BLD AUTO: 10.7 % — HIGH (ref 0–6)
GLUCOSE BLDC GLUCOMTR-MCNC: 236 MG/DL — HIGH (ref 70–99)
GLUCOSE BLDC GLUCOMTR-MCNC: 251 MG/DL — HIGH (ref 70–99)
GLUCOSE BLDC GLUCOMTR-MCNC: 289 MG/DL — HIGH (ref 70–99)
GLUCOSE BLDC GLUCOMTR-MCNC: 306 MG/DL — HIGH (ref 70–99)
GLUCOSE BLDC GLUCOMTR-MCNC: 324 MG/DL — HIGH (ref 70–99)
GLUCOSE SERPL-MCNC: 262 MG/DL — HIGH (ref 70–99)
HCT VFR BLD CALC: 36.5 % — SIGNIFICANT CHANGE UP (ref 34.5–45)
HGB BLD-MCNC: 10.9 G/DL — LOW (ref 11.5–15.5)
IMM GRANULOCYTES NFR BLD AUTO: 0.7 % — SIGNIFICANT CHANGE UP (ref 0–0.9)
LYMPHOCYTES # BLD AUTO: 2.89 K/UL — SIGNIFICANT CHANGE UP (ref 1–3.3)
LYMPHOCYTES # BLD AUTO: 27.1 % — SIGNIFICANT CHANGE UP (ref 13–44)
MAGNESIUM SERPL-MCNC: 1.8 MG/DL — SIGNIFICANT CHANGE UP (ref 1.6–2.6)
MCHC RBC-ENTMCNC: 22.2 PG — LOW (ref 27–34)
MCHC RBC-ENTMCNC: 29.9 GM/DL — LOW (ref 32–36)
MCV RBC AUTO: 74.2 FL — LOW (ref 80–100)
MONOCYTES # BLD AUTO: 0.69 K/UL — SIGNIFICANT CHANGE UP (ref 0–0.9)
MONOCYTES NFR BLD AUTO: 6.5 % — SIGNIFICANT CHANGE UP (ref 2–14)
NEUTROPHILS # BLD AUTO: 5.86 K/UL — SIGNIFICANT CHANGE UP (ref 1.8–7.4)
NEUTROPHILS NFR BLD AUTO: 54.8 % — SIGNIFICANT CHANGE UP (ref 43–77)
NRBC # BLD: 0 /100 WBCS — SIGNIFICANT CHANGE UP (ref 0–0)
PHOSPHATE SERPL-MCNC: 3.1 MG/DL — SIGNIFICANT CHANGE UP (ref 2.5–4.5)
PLATELET # BLD AUTO: 474 K/UL — HIGH (ref 150–400)
POTASSIUM SERPL-MCNC: 4.2 MMOL/L — SIGNIFICANT CHANGE UP (ref 3.5–5.3)
POTASSIUM SERPL-SCNC: 4.2 MMOL/L — SIGNIFICANT CHANGE UP (ref 3.5–5.3)
RBC # BLD: 4.92 M/UL — SIGNIFICANT CHANGE UP (ref 3.8–5.2)
RBC # FLD: 16.1 % — HIGH (ref 10.3–14.5)
SODIUM SERPL-SCNC: 138 MMOL/L — SIGNIFICANT CHANGE UP (ref 135–145)
WBC # BLD: 10.68 K/UL — HIGH (ref 3.8–10.5)
WBC # FLD AUTO: 10.68 K/UL — HIGH (ref 3.8–10.5)

## 2024-02-19 PROCEDURE — 99232 SBSQ HOSP IP/OBS MODERATE 35: CPT

## 2024-02-19 PROCEDURE — 99233 SBSQ HOSP IP/OBS HIGH 50: CPT | Mod: GC

## 2024-02-19 RX ORDER — ACETAMINOPHEN 500 MG
650 TABLET ORAL ONCE
Refills: 0 | Status: COMPLETED | OUTPATIENT
Start: 2024-02-19 | End: 2024-02-19

## 2024-02-19 RX ADMIN — Medication 975 MILLIGRAM(S): at 06:15

## 2024-02-19 RX ADMIN — NORTRIPTYLINE HYDROCHLORIDE 10 MILLIGRAM(S): 10 CAPSULE ORAL at 21:40

## 2024-02-19 RX ADMIN — PANTOPRAZOLE SODIUM 40 MILLIGRAM(S): 20 TABLET, DELAYED RELEASE ORAL at 18:24

## 2024-02-19 RX ADMIN — APIXABAN 5 MILLIGRAM(S): 2.5 TABLET, FILM COATED ORAL at 06:16

## 2024-02-19 RX ADMIN — Medication 975 MILLIGRAM(S): at 07:10

## 2024-02-19 RX ADMIN — Medication 6: at 09:32

## 2024-02-19 RX ADMIN — NYSTATIN CREAM 1 APPLICATION(S): 100000 CREAM TOPICAL at 06:19

## 2024-02-19 RX ADMIN — Medication 8 UNIT(S): at 18:25

## 2024-02-19 RX ADMIN — HUMAN INSULIN 5 UNIT(S): 100 INJECTION, SUSPENSION SUBCUTANEOUS at 06:18

## 2024-02-19 RX ADMIN — HUMAN INSULIN 5 UNIT(S): 100 INJECTION, SUSPENSION SUBCUTANEOUS at 22:27

## 2024-02-19 RX ADMIN — Medication 8 UNIT(S): at 09:32

## 2024-02-19 RX ADMIN — GABAPENTIN 600 MILLIGRAM(S): 400 CAPSULE ORAL at 06:15

## 2024-02-19 RX ADMIN — ATORVASTATIN CALCIUM 80 MILLIGRAM(S): 80 TABLET, FILM COATED ORAL at 21:40

## 2024-02-19 RX ADMIN — Medication 975 MILLIGRAM(S): at 13:00

## 2024-02-19 RX ADMIN — GABAPENTIN 600 MILLIGRAM(S): 400 CAPSULE ORAL at 21:39

## 2024-02-19 RX ADMIN — Medication 1 TABLET(S): at 08:28

## 2024-02-19 RX ADMIN — Medication 4: at 18:25

## 2024-02-19 RX ADMIN — CLOPIDOGREL BISULFATE 75 MILLIGRAM(S): 75 TABLET, FILM COATED ORAL at 21:41

## 2024-02-19 RX ADMIN — Medication 2: at 22:26

## 2024-02-19 RX ADMIN — Medication 975 MILLIGRAM(S): at 14:00

## 2024-02-19 RX ADMIN — Medication 8: at 13:15

## 2024-02-19 RX ADMIN — Medication 50 MILLIGRAM(S): at 06:17

## 2024-02-19 RX ADMIN — APIXABAN 5 MILLIGRAM(S): 2.5 TABLET, FILM COATED ORAL at 18:24

## 2024-02-19 RX ADMIN — HUMAN INSULIN 5 UNIT(S): 100 INJECTION, SUSPENSION SUBCUTANEOUS at 13:16

## 2024-02-19 RX ADMIN — PANTOPRAZOLE SODIUM 40 MILLIGRAM(S): 20 TABLET, DELAYED RELEASE ORAL at 06:16

## 2024-02-19 RX ADMIN — Medication 8 UNIT(S): at 13:15

## 2024-02-19 RX ADMIN — NYSTATIN CREAM 1 APPLICATION(S): 100000 CREAM TOPICAL at 18:23

## 2024-02-19 RX ADMIN — GABAPENTIN 600 MILLIGRAM(S): 400 CAPSULE ORAL at 13:00

## 2024-02-19 NOTE — PROGRESS NOTE ADULT - ASSESSMENT
43F PMH CVA (L weakness), PE (Eliquis), multiple abdominal hernia repairs (Dr. Hurtado), abdominal nec fasc s/p Brandie's w/ ostomy, DM (A1c 10.8, previously 16), CAD w/ stent (4/2023), BMI 35, admitted for increased ostomy output x4-5d and hypoglycemia. Surgery consulted for large parastomal hernia, CT on admission showing large parastomal hernia, possibly compressing stoma, however patient having output on ostomy.  Will defer parastomal hernia repair at this time.     Recommendations     - No surgical intervention at this time for parastomal hernia  - Nutritional and medical optimization required for reversal of ostomy  - Rest of care as per primary team   - Plan discussed with Dr Hollingsworth   - Team 4C will stop actively following the patient

## 2024-02-19 NOTE — PROGRESS NOTE ADULT - SUBJECTIVE AND OBJECTIVE BOX
OVERNIGHT EVENTS: ___ ostomy output ovn    SUBJECTIVE:  Patient seen and examined at bedside, comfortable, NAD. Denied fever, chest pain, dyspnea, abdominal pain.     Vital Signs Last 12 Hrs  T(F): 97.3 (02-19-24 @ 06:01), Max: 97.7 (02-18-24 @ 20:27)  HR: 87 (02-19-24 @ 06:01) (87 - 104)  BP: 139/66 (02-19-24 @ 06:01) (139/66 - 142/74)  BP(mean): --  RR: 18 (02-19-24 @ 06:01) (17 - 18)  SpO2: 96% (02-19-24 @ 06:01) (96% - 97%)  I&O's Summary    18 Feb 2024 07:01  -  19 Feb 2024 07:00  --------------------------------------------------------  IN: 50 mL / OUT: 400 mL / NET: -350 mL        PHYSICAL EXAM:  Constitutional: NAD, comfortable in bed.  HEENT: NC/AT, PERRLA, EOMI, no conjunctival pallor or scleral icterus, MMM  Neck: Supple, no JVD  Respiratory: CTA B/L. No w/r/r.   Cardiovascular: RRR, normal S1 and S2, no m/r/g.   Gastrointestinal: +BS, soft, nt/nd, parastomal hernia, ostomy pink w/ noted friable tissue, no active bleed, liquid stool in bag  Extremities: wwp; no cyanosis, clubbing or edema.   Vascular: Pulses equal and strong throughout.   Neurological: AAOx3, no CN deficits, strength and sensation intact throughout.   Skin: No gross skin abnormalities or rashes        LABS:                        10.9   13.19 )-----------( 448      ( 18 Feb 2024 08:03 )             36.7     02-18    137  |  102  |  15  ----------------------------<  140<H>  3.7   |  24  |  0.72    Ca    8.6      18 Feb 2024 08:03  Phos  3.2     02-18  Mg     1.5     02-18        Urinalysis Basic - ( 18 Feb 2024 08:03 )    Color: x / Appearance: x / SG: x / pH: x  Gluc: 140 mg/dL / Ketone: x  / Bili: x / Urobili: x   Blood: x / Protein: x / Nitrite: x   Leuk Esterase: x / RBC: x / WBC x   Sq Epi: x / Non Sq Epi: x / Bacteria: x          RADIOLOGY & ADDITIONAL TESTS:    MEDICATIONS  (STANDING):  acetaminophen     Tablet .. 975 milliGRAM(s) Oral every 8 hours  apixaban 5 milliGRAM(s) Oral every 12 hours  atorvastatin 80 milliGRAM(s) Oral at bedtime  clopidogrel Tablet 75 milliGRAM(s) Oral every 24 hours  dextrose 5%. 1000 milliLiter(s) (50 mL/Hr) IV Continuous <Continuous>  dextrose 50% Injectable 25 Gram(s) IV Push once  gabapentin 600 milliGRAM(s) Oral every 8 hours  glucagon  Injectable 1 milliGRAM(s) IntraMuscular once  influenza   Vaccine 0.5 milliLiter(s) IntraMuscular once  insulin lispro (ADMELOG) corrective regimen sliding scale   SubCutaneous three times a day before meals  insulin lispro (ADMELOG) corrective regimen sliding scale   SubCutaneous at bedtime  insulin lispro Injectable (ADMELOG) 8 Unit(s) SubCutaneous three times a day before meals  insulin NPH human recombinant 5 Unit(s) SubCutaneous every 8 hours  lactobacillus acidophilus 1 Tablet(s) Oral daily  lidocaine   4% Patch 1 Patch Transdermal every 24 hours  metoprolol succinate ER 50 milliGRAM(s) Oral daily  nortriptyline 10 milliGRAM(s) Oral at bedtime  nystatin Powder 1 Application(s) Topical two times a day  pantoprazole    Tablet 40 milliGRAM(s) Oral two times a day  psyllium Powder 1 Packet(s) Oral two times a day    MEDICATIONS  (PRN):  dextrose Oral Gel 15 Gram(s) Oral once PRN Blood Glucose LESS THAN 70 milliGRAM(s)/deciliter  loperamide 2 milliGRAM(s) Oral every 6 hours PRN Diarrhea   OVERNIGHT EVENTS: none    SUBJECTIVE:  Patient seen and examined at bedside, comfortable, NAD. Denied fever, chest pain, dyspnea, abdominal pain.     Vital Signs Last 12 Hrs  T(F): 97.3 (02-19-24 @ 06:01), Max: 97.7 (02-18-24 @ 20:27)  HR: 87 (02-19-24 @ 06:01) (87 - 104)  BP: 139/66 (02-19-24 @ 06:01) (139/66 - 142/74)  BP(mean): --  RR: 18 (02-19-24 @ 06:01) (17 - 18)  SpO2: 96% (02-19-24 @ 06:01) (96% - 97%)  I&O's Summary    18 Feb 2024 07:01  -  19 Feb 2024 07:00  --------------------------------------------------------  IN: 50 mL / OUT: 400 mL / NET: -350 mL        PHYSICAL EXAM:  Constitutional: NAD, comfortable in bed.  HEENT: NC/AT, PERRLA, EOMI, no conjunctival pallor or scleral icterus, MMM  Neck: Supple, no JVD  Respiratory: CTA B/L. No w/r/r.   Cardiovascular: RRR, normal S1 and S2, no m/r/g.   Gastrointestinal: +BS, soft, nt/nd, parastomal hernia, ostomy pink w/ noted friable tissue, no active bleed, firm, small stool in bag  Extremities: wwp; no cyanosis, clubbing or edema.   Vascular: Pulses equal and strong throughout.   Neurological: AAOx3, no CN deficits, strength and sensation intact throughout.   Skin: No gross skin abnormalities or rashes        LABS:                        10.9   13.19 )-----------( 448      ( 18 Feb 2024 08:03 )             36.7     02-18    137  |  102  |  15  ----------------------------<  140<H>  3.7   |  24  |  0.72    Ca    8.6      18 Feb 2024 08:03  Phos  3.2     02-18  Mg     1.5     02-18        Urinalysis Basic - ( 18 Feb 2024 08:03 )    Color: x / Appearance: x / SG: x / pH: x  Gluc: 140 mg/dL / Ketone: x  / Bili: x / Urobili: x   Blood: x / Protein: x / Nitrite: x   Leuk Esterase: x / RBC: x / WBC x   Sq Epi: x / Non Sq Epi: x / Bacteria: x          RADIOLOGY & ADDITIONAL TESTS:    MEDICATIONS  (STANDING):  acetaminophen     Tablet .. 975 milliGRAM(s) Oral every 8 hours  apixaban 5 milliGRAM(s) Oral every 12 hours  atorvastatin 80 milliGRAM(s) Oral at bedtime  clopidogrel Tablet 75 milliGRAM(s) Oral every 24 hours  dextrose 5%. 1000 milliLiter(s) (50 mL/Hr) IV Continuous <Continuous>  dextrose 50% Injectable 25 Gram(s) IV Push once  gabapentin 600 milliGRAM(s) Oral every 8 hours  glucagon  Injectable 1 milliGRAM(s) IntraMuscular once  influenza   Vaccine 0.5 milliLiter(s) IntraMuscular once  insulin lispro (ADMELOG) corrective regimen sliding scale   SubCutaneous three times a day before meals  insulin lispro (ADMELOG) corrective regimen sliding scale   SubCutaneous at bedtime  insulin lispro Injectable (ADMELOG) 8 Unit(s) SubCutaneous three times a day before meals  insulin NPH human recombinant 5 Unit(s) SubCutaneous every 8 hours  lactobacillus acidophilus 1 Tablet(s) Oral daily  lidocaine   4% Patch 1 Patch Transdermal every 24 hours  metoprolol succinate ER 50 milliGRAM(s) Oral daily  nortriptyline 10 milliGRAM(s) Oral at bedtime  nystatin Powder 1 Application(s) Topical two times a day  pantoprazole    Tablet 40 milliGRAM(s) Oral two times a day  psyllium Powder 1 Packet(s) Oral two times a day    MEDICATIONS  (PRN):  dextrose Oral Gel 15 Gram(s) Oral once PRN Blood Glucose LESS THAN 70 milliGRAM(s)/deciliter  loperamide 2 milliGRAM(s) Oral every 6 hours PRN Diarrhea

## 2024-02-19 NOTE — PROGRESS NOTE ADULT - SUBJECTIVE AND OBJECTIVE BOX
SUBJECTIVE:  Ostomy output improved with addition of loperamide   No blood in ostomy noted   Denies nausea/vomiting   Blood sugar still hard to control (200-300s)     MEDICATIONS  (STANDING):  acetaminophen     Tablet .. 975 milliGRAM(s) Oral every 8 hours  apixaban 5 milliGRAM(s) Oral every 12 hours  atorvastatin 80 milliGRAM(s) Oral at bedtime  clopidogrel Tablet 75 milliGRAM(s) Oral every 24 hours  dextrose 5%. 1000 milliLiter(s) (50 mL/Hr) IV Continuous <Continuous>  dextrose 50% Injectable 25 Gram(s) IV Push once  gabapentin 600 milliGRAM(s) Oral every 8 hours  glucagon  Injectable 1 milliGRAM(s) IntraMuscular once  influenza   Vaccine 0.5 milliLiter(s) IntraMuscular once  insulin lispro (ADMELOG) corrective regimen sliding scale   SubCutaneous three times a day before meals  insulin lispro (ADMELOG) corrective regimen sliding scale   SubCutaneous at bedtime  insulin lispro Injectable (ADMELOG) 8 Unit(s) SubCutaneous three times a day before meals  insulin NPH human recombinant 5 Unit(s) SubCutaneous every 8 hours  lactobacillus acidophilus 1 Tablet(s) Oral daily  lidocaine   4% Patch 1 Patch Transdermal every 24 hours  metoprolol succinate ER 50 milliGRAM(s) Oral daily  nortriptyline 10 milliGRAM(s) Oral at bedtime  nystatin Powder 1 Application(s) Topical two times a day  pantoprazole    Tablet 40 milliGRAM(s) Oral two times a day  psyllium Powder 1 Packet(s) Oral two times a day    MEDICATIONS  (PRN):  dextrose Oral Gel 15 Gram(s) Oral once PRN Blood Glucose LESS THAN 70 milliGRAM(s)/deciliter  loperamide 2 milliGRAM(s) Oral every 6 hours PRN Diarrhea      Vital Signs Last 24 Hrs  T(C): 36.3 (19 Feb 2024 06:01), Max: 36.9 (18 Feb 2024 13:14)  T(F): 97.3 (19 Feb 2024 06:01), Max: 98.5 (18 Feb 2024 13:14)  HR: 87 (19 Feb 2024 06:01) (87 - 104)  BP: 139/66 (19 Feb 2024 06:01) (139/66 - 142/74)  BP(mean): --  RR: 18 (19 Feb 2024 06:01) (17 - 18)  SpO2: 96% (19 Feb 2024 06:01) (96% - 98%)    Parameters below as of 19 Feb 2024 06:01  Patient On (Oxygen Delivery Method): room air      Physical Exam:   General: AAOx3, NAD, WDWN, laying comfortably in bed  Cardio: S1,S2, No MRG, RRR  Pulm: Lungs bilaterally clear to auscultation  Abdomen: soft, mildly ttp throughout. Colostomy p/p/p with output and gas with friable skin surrounding the stoma. There is a large parastomal hernia.  Extremities: WWP, peripheral pulses appreciated  I&O's Summary    18 Feb 2024 07:01  -  19 Feb 2024 07:00  --------------------------------------------------------  IN: 50 mL / OUT: 400 mL / NET: -350 mL        LABS:                        10.9   10.68 )-----------( 474      ( 19 Feb 2024 05:30 )             36.5     02-19    138  |  104  |  21  ----------------------------<  262<H>  4.2   |  25  |  0.81    Ca    9.1      19 Feb 2024 05:30  Phos  3.1     02-19  Mg     1.8     02-19        Urinalysis Basic - ( 19 Feb 2024 05:30 )    Color: x / Appearance: x / SG: x / pH: x  Gluc: 262 mg/dL / Ketone: x  / Bili: x / Urobili: x   Blood: x / Protein: x / Nitrite: x   Leuk Esterase: x / RBC: x / WBC x   Sq Epi: x / Non Sq Epi: x / Bacteria: x      CAPILLARY BLOOD GLUCOSE      POCT Blood Glucose.: 251 mg/dL (19 Feb 2024 09:23)  POCT Blood Glucose.: 324 mg/dL (19 Feb 2024 06:11)  POCT Blood Glucose.: 274 mg/dL (18 Feb 2024 22:04)  POCT Blood Glucose.: 309 mg/dL (18 Feb 2024 18:01)        RADIOLOGY & ADDITIONAL STUDIES:

## 2024-02-19 NOTE — PROGRESS NOTE ADULT - ASSESSMENT
42 y/o F PMH CVA (residual L>R weakness), PE (on Eliquis), multiple abdominal hernia repairs, nec fasc of abdomen s/o corbin procedure w ostomy, uncontrolled DM,  CAD with multiple stents (last in August 2023) presenting with abd pain, n/v, increased ostomy output, and low blood sugar found to have hypokalemia and hypoglycemia, admitted to [facility] for further management.

## 2024-02-20 ENCOUNTER — TRANSCRIPTION ENCOUNTER (OUTPATIENT)
Age: 44
End: 2024-02-20

## 2024-02-20 VITALS
SYSTOLIC BLOOD PRESSURE: 135 MMHG | DIASTOLIC BLOOD PRESSURE: 74 MMHG | TEMPERATURE: 98 F | RESPIRATION RATE: 18 BRPM | HEART RATE: 98 BPM | OXYGEN SATURATION: 95 %

## 2024-02-20 LAB
ANION GAP SERPL CALC-SCNC: 10 MMOL/L — SIGNIFICANT CHANGE UP (ref 5–17)
ANISOCYTOSIS BLD QL: SIGNIFICANT CHANGE UP
BASOPHILS # BLD AUTO: 0.1 K/UL — SIGNIFICANT CHANGE UP (ref 0–0.2)
BASOPHILS NFR BLD AUTO: 0.9 % — SIGNIFICANT CHANGE UP (ref 0–2)
BUN SERPL-MCNC: 23 MG/DL — SIGNIFICANT CHANGE UP (ref 7–23)
CALCIUM SERPL-MCNC: 9.1 MG/DL — SIGNIFICANT CHANGE UP (ref 8.4–10.5)
CHLORIDE SERPL-SCNC: 102 MMOL/L — SIGNIFICANT CHANGE UP (ref 96–108)
CO2 SERPL-SCNC: 24 MMOL/L — SIGNIFICANT CHANGE UP (ref 22–31)
CREAT SERPL-MCNC: 0.78 MG/DL — SIGNIFICANT CHANGE UP (ref 0.5–1.3)
EGFR: 97 ML/MIN/1.73M2 — SIGNIFICANT CHANGE UP
EOSINOPHIL # BLD AUTO: 1.5 K/UL — HIGH (ref 0–0.5)
EOSINOPHIL NFR BLD AUTO: 13.4 % — HIGH (ref 0–6)
GLUCOSE BLDC GLUCOMTR-MCNC: 234 MG/DL — HIGH (ref 70–99)
GLUCOSE BLDC GLUCOMTR-MCNC: 245 MG/DL — HIGH (ref 70–99)
GLUCOSE SERPL-MCNC: 264 MG/DL — HIGH (ref 70–99)
HCT VFR BLD CALC: 35.6 % — SIGNIFICANT CHANGE UP (ref 34.5–45)
HGB BLD-MCNC: 10.7 G/DL — LOW (ref 11.5–15.5)
HYPOCHROMIA BLD QL: SIGNIFICANT CHANGE UP
LYMPHOCYTES # BLD AUTO: 18.8 % — SIGNIFICANT CHANGE UP (ref 13–44)
LYMPHOCYTES # BLD AUTO: 2.1 K/UL — SIGNIFICANT CHANGE UP (ref 1–3.3)
MAGNESIUM SERPL-MCNC: 2 MG/DL — SIGNIFICANT CHANGE UP (ref 1.6–2.6)
MANUAL SMEAR VERIFICATION: SIGNIFICANT CHANGE UP
MCHC RBC-ENTMCNC: 22.1 PG — LOW (ref 27–34)
MCHC RBC-ENTMCNC: 30.1 GM/DL — LOW (ref 32–36)
MCV RBC AUTO: 73.6 FL — LOW (ref 80–100)
MICROCYTES BLD QL: SIGNIFICANT CHANGE UP
MONOCYTES # BLD AUTO: 0.89 K/UL — SIGNIFICANT CHANGE UP (ref 0–0.9)
MONOCYTES NFR BLD AUTO: 8 % — SIGNIFICANT CHANGE UP (ref 2–14)
NEUTROPHILS # BLD AUTO: 6.58 K/UL — SIGNIFICANT CHANGE UP (ref 1.8–7.4)
NEUTROPHILS NFR BLD AUTO: 58.9 % — SIGNIFICANT CHANGE UP (ref 43–77)
OVALOCYTES BLD QL SMEAR: SLIGHT — SIGNIFICANT CHANGE UP
PHOSPHATE SERPL-MCNC: 3.5 MG/DL — SIGNIFICANT CHANGE UP (ref 2.5–4.5)
PLAT MORPH BLD: NORMAL — SIGNIFICANT CHANGE UP
PLATELET # BLD AUTO: 456 K/UL — HIGH (ref 150–400)
POIKILOCYTOSIS BLD QL AUTO: SLIGHT — SIGNIFICANT CHANGE UP
POLYCHROMASIA BLD QL SMEAR: SLIGHT — SIGNIFICANT CHANGE UP
POTASSIUM SERPL-MCNC: 3.8 MMOL/L — SIGNIFICANT CHANGE UP (ref 3.5–5.3)
POTASSIUM SERPL-SCNC: 3.8 MMOL/L — SIGNIFICANT CHANGE UP (ref 3.5–5.3)
RBC # BLD: 4.84 M/UL — SIGNIFICANT CHANGE UP (ref 3.8–5.2)
RBC # FLD: 15.9 % — HIGH (ref 10.3–14.5)
RBC BLD AUTO: ABNORMAL
SODIUM SERPL-SCNC: 136 MMOL/L — SIGNIFICANT CHANGE UP (ref 135–145)
WBC # BLD: 11.17 K/UL — HIGH (ref 3.8–10.5)
WBC # FLD AUTO: 11.17 K/UL — HIGH (ref 3.8–10.5)

## 2024-02-20 PROCEDURE — 86850 RBC ANTIBODY SCREEN: CPT

## 2024-02-20 PROCEDURE — 86337 INSULIN ANTIBODIES: CPT

## 2024-02-20 PROCEDURE — 83735 ASSAY OF MAGNESIUM: CPT

## 2024-02-20 PROCEDURE — 83036 HEMOGLOBIN GLYCOSYLATED A1C: CPT

## 2024-02-20 PROCEDURE — 81001 URINALYSIS AUTO W/SCOPE: CPT

## 2024-02-20 PROCEDURE — 84999 UNLISTED CHEMISTRY PROCEDURE: CPT

## 2024-02-20 PROCEDURE — 87449 NOS EACH ORGANISM AG IA: CPT

## 2024-02-20 PROCEDURE — 80048 BASIC METABOLIC PNL TOTAL CA: CPT

## 2024-02-20 PROCEDURE — 99285 EMERGENCY DEPT VISIT HI MDM: CPT

## 2024-02-20 PROCEDURE — 96374 THER/PROPH/DIAG INJ IV PUSH: CPT

## 2024-02-20 PROCEDURE — 85730 THROMBOPLASTIN TIME PARTIAL: CPT

## 2024-02-20 PROCEDURE — 84302 ASSAY OF SWEAT SODIUM: CPT

## 2024-02-20 PROCEDURE — 83525 ASSAY OF INSULIN: CPT

## 2024-02-20 PROCEDURE — 80053 COMPREHEN METABOLIC PANEL: CPT

## 2024-02-20 PROCEDURE — 80076 HEPATIC FUNCTION PANEL: CPT

## 2024-02-20 PROCEDURE — 99239 HOSP IP/OBS DSCHRG MGMT >30: CPT | Mod: GC

## 2024-02-20 PROCEDURE — 83993 ASSAY FOR CALPROTECTIN FECAL: CPT

## 2024-02-20 PROCEDURE — 82803 BLOOD GASES ANY COMBINATION: CPT

## 2024-02-20 PROCEDURE — 84132 ASSAY OF SERUM POTASSIUM: CPT

## 2024-02-20 PROCEDURE — 85025 COMPLETE CBC W/AUTO DIFF WBC: CPT

## 2024-02-20 PROCEDURE — 82653 EL-1 FECAL QUANTITATIVE: CPT

## 2024-02-20 PROCEDURE — 82330 ASSAY OF CALCIUM: CPT

## 2024-02-20 PROCEDURE — 87040 BLOOD CULTURE FOR BACTERIA: CPT

## 2024-02-20 PROCEDURE — 36415 COLL VENOUS BLD VENIPUNCTURE: CPT

## 2024-02-20 PROCEDURE — 86900 BLOOD TYPING SEROLOGIC ABO: CPT

## 2024-02-20 PROCEDURE — 84295 ASSAY OF SERUM SODIUM: CPT

## 2024-02-20 PROCEDURE — 87324 CLOSTRIDIUM AG IA: CPT

## 2024-02-20 PROCEDURE — 86901 BLOOD TYPING SEROLOGIC RH(D): CPT

## 2024-02-20 PROCEDURE — 82962 GLUCOSE BLOOD TEST: CPT

## 2024-02-20 PROCEDURE — 86880 COOMBS TEST DIRECT: CPT

## 2024-02-20 PROCEDURE — 93005 ELECTROCARDIOGRAM TRACING: CPT

## 2024-02-20 PROCEDURE — 87507 IADNA-DNA/RNA PROBE TQ 12-25: CPT

## 2024-02-20 PROCEDURE — 74018 RADEX ABDOMEN 1 VIEW: CPT

## 2024-02-20 PROCEDURE — 87070 CULTURE OTHR SPECIMN AEROBIC: CPT

## 2024-02-20 PROCEDURE — 85610 PROTHROMBIN TIME: CPT

## 2024-02-20 PROCEDURE — 84100 ASSAY OF PHOSPHORUS: CPT

## 2024-02-20 PROCEDURE — 71045 X-RAY EXAM CHEST 1 VIEW: CPT

## 2024-02-20 PROCEDURE — 86870 RBC ANTIBODY IDENTIFICATION: CPT

## 2024-02-20 PROCEDURE — 83527 ASSAY OF INSULIN: CPT

## 2024-02-20 PROCEDURE — 84681 ASSAY OF C-PEPTIDE: CPT

## 2024-02-20 PROCEDURE — 83690 ASSAY OF LIPASE: CPT

## 2024-02-20 PROCEDURE — 84702 CHORIONIC GONADOTROPIN TEST: CPT

## 2024-02-20 PROCEDURE — 87637 SARSCOV2&INF A&B&RSV AMP PRB: CPT

## 2024-02-20 PROCEDURE — 99232 SBSQ HOSP IP/OBS MODERATE 35: CPT

## 2024-02-20 PROCEDURE — 74176 CT ABD & PELVIS W/O CONTRAST: CPT | Mod: MA

## 2024-02-20 PROCEDURE — 83605 ASSAY OF LACTIC ACID: CPT

## 2024-02-20 RX ORDER — INSULIN GLARGINE 100 [IU]/ML
40 INJECTION, SOLUTION SUBCUTANEOUS
Qty: 4 | Refills: 0
Start: 2024-02-20 | End: 2024-03-20

## 2024-02-20 RX ORDER — INSULIN LISPRO 100/ML
30 VIAL (ML) SUBCUTANEOUS
Qty: 4 | Refills: 0 | DISCHARGE
Start: 2024-02-20 | End: 2024-03-20

## 2024-02-20 RX ORDER — LOPERAMIDE HCL 2 MG
1 TABLET ORAL
Qty: 56 | Refills: 0
Start: 2024-02-20 | End: 2024-03-04

## 2024-02-20 RX ORDER — INSULIN LISPRO 100/ML
12 VIAL (ML) SUBCUTANEOUS
Qty: 4 | Refills: 0
Start: 2024-02-20 | End: 2024-03-20

## 2024-02-20 RX ORDER — INSULIN LISPRO 100/ML
15 VIAL (ML) SUBCUTANEOUS
Qty: 4 | Refills: 0
Start: 2024-02-20 | End: 2024-03-20

## 2024-02-20 RX ORDER — NORTRIPTYLINE HYDROCHLORIDE 10 MG/1
1 CAPSULE ORAL
Qty: 0 | Refills: 0 | DISCHARGE
Start: 2024-02-20

## 2024-02-20 RX ADMIN — HUMAN INSULIN 5 UNIT(S): 100 INJECTION, SUSPENSION SUBCUTANEOUS at 07:00

## 2024-02-20 RX ADMIN — Medication 8 UNIT(S): at 09:17

## 2024-02-20 RX ADMIN — PANTOPRAZOLE SODIUM 40 MILLIGRAM(S): 20 TABLET, DELAYED RELEASE ORAL at 06:54

## 2024-02-20 RX ADMIN — Medication 1 TABLET(S): at 09:16

## 2024-02-20 RX ADMIN — NYSTATIN CREAM 1 APPLICATION(S): 100000 CREAM TOPICAL at 06:55

## 2024-02-20 RX ADMIN — Medication 4: at 09:16

## 2024-02-20 RX ADMIN — APIXABAN 5 MILLIGRAM(S): 2.5 TABLET, FILM COATED ORAL at 06:55

## 2024-02-20 RX ADMIN — Medication 50 MILLIGRAM(S): at 06:54

## 2024-02-20 NOTE — PROGRESS NOTE ADULT - ATTENDING COMMENTS
Pt seen and d/w fellow.  F/u additional stool studies.  Doing better on Reglan, could add loperamide.  Endocrine f/u.
Date of service 2/14/24 - pt seen and examined w housestaff at bedside.    Pt reports reglan helped w nausea - no further emesis. States abdominal pain unchanged. Able to eat a small amount. Noted GI impression N/V, pain possibly d/t gastroparesis and poor glucose control    Plan  Overall - pt ambulating while in room. Hgb remains stable. Abdominal exam remains benign   Titrate PRN oxycodone to 2.5mg q6h for severe pain.   Remaining on ssi due fluctuating PO intake and blood sugars.  Consult surgery for oozing from ostomy - discuss whether from mucosa vs GI bleeding.
Date of service 2/12/24 - pt seen and examined w teaching team  43F w CVA (L>R weakness), PE (4/2023) on eliquis), IDDM2, CAD (NAT in 6/2023), HTN, HLD, Abdominal nec fasciitis s/p diverting colostomy creation 11/2021 at [facility] p/w N/V w increased ostomy output w increased abdominal pain, GI PCR and C diff negative, CT A/P wo acute process,     Reporting worsening lower abdominal pain accompanied w nausea, vomiting - felt vomitus was same as output in ostomy --- also reporting increased ostomy output. Exam - female in NAD on RA, large abdomen, yellow-brown liquid ostomy output. NABS, diffusely tender abdomen wo rebound or guarding.    Plan  Noted GI PCR and C diff negative. Encourage PO/Fluid intake. PRN zofran for nausea. PRN loperamide for diarrhea.  Transition off IV dilaudid to oxycodone 5/10 for mod/severe pain q6h -- pt reports she stretched 5d supply of oxycodone over last 4wk and pain worsened when she ran out.   GYN c/s  F/U Endocrine recs regarding DM - Noted pt w fluctuating intake d/t GI sxs    PPx: Eliquis for PE
Date of service 2/13/24 - seen with housestaff at bedside  Reporting high output from ostomy and leakage requiring ostomy bag replacement. Initially comfortable and resting in bed then tearful when discussing abdominal pain. Ambulating no chest pain, dyspnea, LH/dizziness  Exam: female in NAD on RA. Abd large, NABS, ostomy w liquid output. diffuse tenderness wo guarding or rebound.     Plan  Consult GI i/s/o continued nausea, vomiting and abdominal pain   - recommended Reglan PRN  Will decrease oxycodone from 5/10 q6h PRN to 2.5/5mg q6h PRN for mod/severe pain, respectively - discussed w patient concern for constipation due to opiates.
Date of service 2/17/24 - pt seen and examined w housestaff  Pt ambulating in room. Tolerating food wo nausea or vomiting. Reports abdominal pain. Re-iterated suspicion of gastroparesis leading to pain and that usage of opioids can make symptoms worse. Pt declining tylenol and metamucil used to improve high ostomy output. GI PCR negative.  Exam: large abdomen. liquid brown output in ostomy bag wo leakage.   --f/u endocrine recs  --titrate off oxycodone 2.5mg PRN q6h
Patient is refusing to be examined: states she is ''very pissed'' she has been here 5 days and unsatisfied with her care.   output has improved  patient continues to have abdominal pain - nausea improved with reglan    pt needs outpt pain management fu with Dr. Rosario Zapata- [phone]  uncontrolled dm 2: a1c > 10 improved from last month -- fu final endo recs for discharge today   MR for discharge as surgery does not plan for acute intervention
Date of service 2/19/24  43F w CVA (L>R weakness), PE (4/2023) on eliquis), IDDM2, CAD (NAT in 6/2023), HTN, HLD, Abdominal nec fasciitis s/p diverting colostomy creation 11/2021 at [facility] p/w N/V w increased ostomy output w increased abdominal pain, GI PCR and C diff negative, CT A/P wo acute process, suspect possible fluctuating glucose levels leading to N/V and abdominal pain w possible gastroparesis like process - ambulating, tolerating soup wo N/V --ostomy output improved    Pt reports tolerating food wo emesis. States nortriptyline 10mg helped w abdominal pain for short duration. Has been able to ambulate in room and in bathroom. Denies LH/dizziness, dyspnea, dysuria. Reports ostomy outputs have not been accurate as she has been emptying full bags instead of waiting for nursing.   Abd - large, soft, NABS, tender diffusely but wo guarding or rebound. normal active bowel sounds. Ostomy wo oozing -- pt recently replaced ostomy.     #Abdominal pain - ddx includes gastroparesis. Less likely infectious process as GI PCR, C Diff has been negative. CT A/P negative for acute process   - nortriptyline 10mg  #High output ostomy - this appears to have resolved. Output <1000 on I/O in last 24h. GI PCR and C diff negative on admission. Repeat GI PCR also negative. Last use of loperamide 2/15.    #DM2 - glucose . No further hypoglycemia. BG range 150-300. Endocrine following   - on NPH 5u q8h, Lispro 8u AC, SSI  #CAD s/p PCI - on plavix, toprol 50 daily  #Prior DVT - on eliquis 5 BID  #Chronic neuropathy - home on gabapentin 600mg q8h  #Normocytic anemia - hgb 10.9. was 11.7 on admission.    #Obesity - BMI 34. Affects all aspects of care    Plan  --Medical team has had extensive discussions w patient regarding pain control and explaining efforts to steer away from opioids due to possibility of gastroparesis. Pt has been able to tolerate soup wo N/V and has been able to ambulate. Ostomy output has decreased and become more formed.  --Continue nortriptyline 10mg daily for abd pain  --Noted surgery recs -- would consider reversal of ostomy once glucose and A1c more controlled. No surgical intervention at this time  --f/u Endocrine recs -- pt has had fluctuating blood sugars made more challenging due to N/V and intermittent dietary non-adherence    DISPO: Home  [facility]
Date of service 2/16/24 - pt seen and examined w housestaff.  Pt reports continued high output from ostomy and leakage. Felt that PRN loperamide did not improve output. Episode of hypoglycemia overnight. Pt reports drinking 1/2 bottle of orange juice.   Pt resting in bed in NAD. Ostomy w brown liquid output. No rebound or guarding on exam.    Plan  Repeat GI PCR to evaluate cause of high output ostomy - if negative would add metamucil  Counseled again to eat small, protein rich meals throughout the day and to avoid high sugar foods. Noted pt used orange juice to correct hypoglycemia.

## 2024-02-20 NOTE — PROGRESS NOTE ADULT - ASSESSMENT
43F PMH CVA (L weakness), PE (Eliquis), multiple abdominal hernia repairs (Dr. Hurtado), abdominal nec fasc s/p Brandie's w/ ostomy, DM (A1c 10.8, previously 16), CAD w/ stent (4/2023), BMI 35, admitted for increased ostomy output x4-5d and hypoglycemia. Surgery consulted for large parastomal hernia, CT on admission showing large parastomal hernia, possibly compressing stoma, however patient having output on ostomy.  Will defer parastomal hernia repair at this time.     Recommendations     - No surgical intervention at this time for parastomal hernia  - Nutritional and medical optimization required for reversal of ostomy, likely not this admission due to poor surgical candidacy  - Rest of care as per primary team   - Plan discussed with Dr Hurtado   - Team 4C will stop actively following the patient

## 2024-02-20 NOTE — PROGRESS NOTE ADULT - PROBLEM SELECTOR PROBLEM 4
GI bleed
GI bleed
SIRS (systemic inflammatory response syndrome)
GI bleed
Hypokalemia
Hypokalemia
GI bleed

## 2024-02-20 NOTE — PROGRESS NOTE ADULT - PROBLEM SELECTOR PROBLEM 7
CAD (coronary artery disease)
Diabetes
CAD (coronary artery disease)

## 2024-02-20 NOTE — DISCHARGE NOTE NURSING/CASE MANAGEMENT/SOCIAL WORK - PATIENT PORTAL LINK FT
You can access the FollowMyHealth Patient Portal offered by [facility] by registering at the following website: [email or web address]. By joining [facility]’s FollowMyHealth portal, you will also be able to view your health information using other applications (apps) compatible with our system.

## 2024-02-20 NOTE — PROGRESS NOTE ADULT - REASON FOR ADMISSION
hypokalemia, hypoglycemia, increased ostomy output
hypokalemia, hypoglycemia, increased ostomy output

## 2024-02-20 NOTE — PROGRESS NOTE ADULT - ASSESSMENT
43y Female with type 2 insulin-requiring DM, markedly insulin-resistant on U-500 insulin, admitted with hypoglycemia secondary to N/V for the past 24 hours.    Glucose now rising. Not eating meals, but some high glycemic foods at bedside. Labile glucose likely from long half life of U-500 v gastroparesis v insulin binding antibodies v reactive hypoglycemia (continues to have good amount of endogenous insulin, c peptide 9.7)    Advised her to watch her dexcom, and if SG is 100 with down arrow, to treat with a mixed snack like yogurt parfait, instead of waiting until it is <70 and overtreating with juice which can cause more osmotic changes and can be contributing to ostomy output, and also increased risk for reactive hypoglycemia as she is making a lot of endogenous insulin that will respond to glucose (juice) bolus.    A1C: 10.8 % (16.4 % six weeks ago)  C-peptide 9.7 (May 2023)  Insulin and insulin abs pending results  BUN: 23  Creatinine: 0.78  GFR: 97  Weight: 101.2  BMI: 34.9   43y Female with type 2 insulin-requiring DM, markedly insulin-resistant on U-500 insulin, admitted with hypoglycemia secondary to N/V for the past 24 hours.    Labile glucose likely from long half life of U-500 v gastroparesis v insulin binding antibodies v reactive hypoglycemia (continues to have good amount of endogenous insulin, c peptide 9.7)    Advised her to watch her dexcom, and if SG is 100 with down arrow, to treat with a mixed snack like yogurt parfait, instead of waiting until it is <70 and overtreating with juice which can cause more osmotic changes and can be contributing to ostomy output, and also increased risk for reactive hypoglycemia as she is making a lot of endogenous insulin that will respond to glucose (juice) bolus.    At time of discharge, insulin doses are not high enough to warrant U-500 so recommend transitioning to basal/bolus. Stop metformin and ozempic as to no further exacerbate GI symptoms. Discussed taking short actin insulin with any carb ingested, including milk and that juice should only be used for hypoglycemia treatment.    A1C: 10.8 % (16.4 % six weeks ago)  C-peptide 9.7 (May 2023)  Insulin and insulin abs pending results  BUN: 23  Creatinine: 0.78  GFR: 97  Weight: 101.2  BMI: 34.9

## 2024-02-20 NOTE — PROGRESS NOTE ADULT - PROBLEM SELECTOR PROBLEM 5
SIRS (systemic inflammatory response syndrome)
Hypoglycemia
SIRS (systemic inflammatory response syndrome)
Hypoglycemia
Hypokalemia
SIRS (systemic inflammatory response syndrome)
SIRS (systemic inflammatory response syndrome)

## 2024-02-20 NOTE — PROGRESS NOTE ADULT - PROBLEM SELECTOR PROBLEM 6
Hypokalemia
Hypokalemia
CAD (coronary artery disease)
Hypokalemia
Hypokalemia
CAD (coronary artery disease)
CAD (coronary artery disease)

## 2024-02-20 NOTE — PROGRESS NOTE ADULT - PROBLEM SELECTOR PROBLEM 2
SIRS (systemic inflammatory response syndrome)
Diarrhea

## 2024-02-20 NOTE — PROGRESS NOTE ADULT - PROBLEM SELECTOR PLAN 6
In setting of diarrhea and low solid PO intake. Now resolved.
Recent stent in Aug 2023, on plavix and lipitor, no ischemic changes in admission ekg  - c/w home medications
In setting of diarrhea and low solid PO intake. Now resolved.

## 2024-02-20 NOTE — PROGRESS NOTE ADULT - PROBLEM SELECTOR PROBLEM 8
Diabetes
Pulmonary embolism
Diabetes
Diabetes
Pulmonary embolism
Diabetes
Pulmonary embolism
10-May-2022 22:30

## 2024-02-20 NOTE — DISCHARGE NOTE NURSING/CASE MANAGEMENT/SOCIAL WORK - NSDCVIVACCINE_GEN_ALL_CORE_FT
Tdap; 25-Apr-2019 12:41; Neena Chavez (ARMANDO); [employer]; [id number] (Exp. Date: 02-Nov-2020); IntraMuscular; Deltoid Right.; 0.5 milliLiter(s); VIS (VIS Published: 09-May-2013, VIS Presented: 25-Apr-2019);

## 2024-02-20 NOTE — PROGRESS NOTE ADULT - SUBJECTIVE AND OBJECTIVE BOX
OVERNIGHT EVENTS: None    SUBJECTIVE:  Patient seen and examined at bedside, comfortable, NAD. Denied fever, chest pain, dyspnea, abdominal pain.     Vital Signs Last 12 Hrs  T(F): 98.1 (02-20-24 @ 06:22), Max: 98.1 (02-19-24 @ 20:14)  HR: 97 (02-20-24 @ 06:22) (97 - 108)  BP: 149/81 (02-20-24 @ 06:22) (126/78 - 149/81)  BP(mean): --  RR: 17 (02-20-24 @ 06:22) (17 - 18)  SpO2: 96% (02-20-24 @ 06:22) (96% - 97%)  I&O's Summary    19 Feb 2024 07:01  -  20 Feb 2024 07:00  --------------------------------------------------------  IN: 0 mL / OUT: 125 mL / NET: -125 mL        PHYSICAL EXAM:  Constitutional: Obese f in NAD, comfortable in bed.  HEENT: NC/AT, PERRLA, EOMI, no conjunctival pallor or scleral icterus, MMM  Neck: Supple, no JVD  Respiratory: CTA B/L. No w/r/r.   Cardiovascular: RRR, normal S1 and S2, no m/r/g.   Gastrointestinal: +BS, soft, nt/nd, parastomal hernia, ostomy pink, no active bleed, firm, small stool in bag  Extremities: wwp; no cyanosis, clubbing or edema.   Vascular: Pulses equal and strong throughout.   Neurological: AAOx3, no CN deficits, strength and sensation intact throughout.   Skin: No gross skin abnormalities or rashes        LABS:                        10.9   10.68 )-----------( 474      ( 19 Feb 2024 05:30 )             36.5     02-19    138  |  104  |  21  ----------------------------<  262<H>  4.2   |  25  |  0.81    Ca    9.1      19 Feb 2024 05:30  Phos  3.1     02-19  Mg     1.8     02-19        Urinalysis Basic - ( 19 Feb 2024 05:30 )    Color: x / Appearance: x / SG: x / pH: x  Gluc: 262 mg/dL / Ketone: x  / Bili: x / Urobili: x   Blood: x / Protein: x / Nitrite: x   Leuk Esterase: x / RBC: x / WBC x   Sq Epi: x / Non Sq Epi: x / Bacteria: x          RADIOLOGY & ADDITIONAL TESTS:    MEDICATIONS  (STANDING):  acetaminophen     Tablet .. 975 milliGRAM(s) Oral every 8 hours  apixaban 5 milliGRAM(s) Oral every 12 hours  atorvastatin 80 milliGRAM(s) Oral at bedtime  clopidogrel Tablet 75 milliGRAM(s) Oral every 24 hours  dextrose 5%. 1000 milliLiter(s) (50 mL/Hr) IV Continuous <Continuous>  dextrose 50% Injectable 25 Gram(s) IV Push once  gabapentin 600 milliGRAM(s) Oral every 8 hours  glucagon  Injectable 1 milliGRAM(s) IntraMuscular once  insulin lispro (ADMELOG) corrective regimen sliding scale   SubCutaneous three times a day before meals  insulin lispro (ADMELOG) corrective regimen sliding scale   SubCutaneous at bedtime  insulin lispro Injectable (ADMELOG) 8 Unit(s) SubCutaneous three times a day before meals  insulin NPH human recombinant 5 Unit(s) SubCutaneous every 8 hours  lactobacillus acidophilus 1 Tablet(s) Oral daily  lidocaine   4% Patch 1 Patch Transdermal every 24 hours  metoprolol succinate ER 50 milliGRAM(s) Oral daily  nortriptyline 10 milliGRAM(s) Oral at bedtime  nystatin Powder 1 Application(s) Topical two times a day  pantoprazole    Tablet 40 milliGRAM(s) Oral two times a day  psyllium Powder 1 Packet(s) Oral two times a day    MEDICATIONS  (PRN):  dextrose Oral Gel 15 Gram(s) Oral once PRN Blood Glucose LESS THAN 70 milliGRAM(s)/deciliter  loperamide 2 milliGRAM(s) Oral every 6 hours PRN Diarrhea

## 2024-02-20 NOTE — PROGRESS NOTE ADULT - SUBJECTIVE AND OBJECTIVE BOX
SUBJECTIVE:    SUBJECTIVE:  Ostomy output improved with addition of loperamide   No blood in ostomy noted   Denies nausea/vomiting   Blood sugar still hard to control (200-300s)     No acute events overnight     MEDICATIONS  (STANDING):  acetaminophen     Tablet .. 975 milliGRAM(s) Oral every 8 hours  apixaban 5 milliGRAM(s) Oral every 12 hours  atorvastatin 80 milliGRAM(s) Oral at bedtime  clopidogrel Tablet 75 milliGRAM(s) Oral every 24 hours  dextrose 5%. 1000 milliLiter(s) (50 mL/Hr) IV Continuous <Continuous>  dextrose 50% Injectable 25 Gram(s) IV Push once  gabapentin 600 milliGRAM(s) Oral every 8 hours  glucagon  Injectable 1 milliGRAM(s) IntraMuscular once  insulin lispro (ADMELOG) corrective regimen sliding scale   SubCutaneous three times a day before meals  insulin lispro (ADMELOG) corrective regimen sliding scale   SubCutaneous at bedtime  insulin lispro Injectable (ADMELOG) 8 Unit(s) SubCutaneous three times a day before meals  insulin NPH human recombinant 5 Unit(s) SubCutaneous every 8 hours  lactobacillus acidophilus 1 Tablet(s) Oral daily  lidocaine   4% Patch 1 Patch Transdermal every 24 hours  metoprolol succinate ER 50 milliGRAM(s) Oral daily  nortriptyline 10 milliGRAM(s) Oral at bedtime  nystatin Powder 1 Application(s) Topical two times a day  pantoprazole    Tablet 40 milliGRAM(s) Oral two times a day  psyllium Powder 1 Packet(s) Oral two times a day    MEDICATIONS  (PRN):  dextrose Oral Gel 15 Gram(s) Oral once PRN Blood Glucose LESS THAN 70 milliGRAM(s)/deciliter  loperamide 2 milliGRAM(s) Oral every 6 hours PRN Diarrhea      Vital Signs Last 24 Hrs  T(C): 36.7 (20 Feb 2024 06:22), Max: 37.4 (19 Feb 2024 12:49)  T(F): 98.1 (20 Feb 2024 06:22), Max: 99.4 (19 Feb 2024 12:49)  HR: 97 (20 Feb 2024 06:22) (91 - 108)  BP: 149/81 (20 Feb 2024 06:22) (126/78 - 149/81)  BP(mean): --  RR: 17 (20 Feb 2024 06:22) (16 - 18)  SpO2: 96% (20 Feb 2024 06:22) (96% - 97%)    Parameters below as of 20 Feb 2024 06:22  Patient On (Oxygen Delivery Method): room air        Physical Exam:   General: AAOx3, NAD, WDWN, laying comfortably in bed  Cardio: S1,S2, No MRG, RRR  Pulm: Lungs bilaterally clear to auscultation  Abdomen: soft, mildly ttp throughout. Colostomy p/p/p with output and gas with friable skin surrounding the stoma. There is a large parastomal hernia.  Extremities: WWP, peripheral pulses appreciated  I&O's Summary    I&O's Summary    19 Feb 2024 07:01  -  20 Feb 2024 07:00  --------------------------------------------------------  IN: 0 mL / OUT: 125 mL / NET: -125 mL        LABS:                        10.9   10.68 )-----------( 474      ( 19 Feb 2024 05:30 )             36.5     02-19    138  |  104  |  21  ----------------------------<  262<H>  4.2   |  25  |  0.81    Ca    9.1      19 Feb 2024 05:30  Phos  3.1     02-19  Mg     1.8     02-19        Urinalysis Basic - ( 19 Feb 2024 05:30 )    Color: x / Appearance: x / SG: x / pH: x  Gluc: 262 mg/dL / Ketone: x  / Bili: x / Urobili: x   Blood: x / Protein: x / Nitrite: x   Leuk Esterase: x / RBC: x / WBC x   Sq Epi: x / Non Sq Epi: x / Bacteria: x      CAPILLARY BLOOD GLUCOSE      POCT Blood Glucose.: 234 mg/dL (20 Feb 2024 06:57)  POCT Blood Glucose.: 289 mg/dL (19 Feb 2024 22:03)  POCT Blood Glucose.: 236 mg/dL (19 Feb 2024 18:04)  POCT Blood Glucose.: 306 mg/dL (19 Feb 2024 13:11)  POCT Blood Glucose.: 251 mg/dL (19 Feb 2024 09:23)        RADIOLOGY & ADDITIONAL STUDIES:

## 2024-02-20 NOTE — PROGRESS NOTE ADULT - SUBJECTIVE AND OBJECTIVE BOX
SUBJECTIVE / INTERVAL HPI: Patient was seen and examined this morning.     CAPILLARY BLOOD GLUCOSE & INSULIN RECEIVED  262 mg/dL (02-19 @ 05:30)  324 mg/dL (02-19 @ 06:11)  251 mg/dL (02-19 @ 09:23)  306 mg/dL (02-19 @ 13:11)  236 mg/dL (02-19 @ 18:04)  289 mg/dL (02-19 @ 22:03)  264 mg/dL (02-20 @ 05:30)  234 mg/dL (02-20 @ 06:57)  245 mg/dL (02-20 @ 09:01)      REVIEW OF SYSTEMS  Constitutional:  Negative fever, chills or loss of appetite.  Eyes:  Negative blurry vision or double vision.  Cardiovascular:  Negative for chest pain or palpitations.  Respiratory:  Negative for cough, wheezing, or shortness of breath.    Gastrointestinal:  Negative for nausea, vomiting, diarrhea, constipation, or abdominal pain.  Genitourinary:  Negative frequency, urgency or dysuria.  Neurologic:  No headache, confusion, dizziness, lightheadedness.    PHYSICAL EXAM  Vital Signs Last 24 Hrs  T(C): 36.8 (20 Feb 2024 12:22), Max: 37.4 (19 Feb 2024 12:49)  T(F): 98.3 (20 Feb 2024 12:22), Max: 99.4 (19 Feb 2024 12:49)  HR: 98 (20 Feb 2024 12:22) (91 - 108)  BP: 135/74 (20 Feb 2024 12:22) (126/78 - 149/81)  BP(mean): --  RR: 18 (20 Feb 2024 12:22) (16 - 18)  SpO2: 95% (20 Feb 2024 12:22) (95% - 97%)    Parameters below as of 20 Feb 2024 12:22  Patient On (Oxygen Delivery Method): room air    Constitutional: Awake, alert, in no acute distress. Obese  HEENT: Normocephalic, atraumatic, NAOMI.  Respiratory: Lungs clear to ausculation bilaterally.   Cardiovascular: regular rhythm, normal S1 and S2, no audible murmurs.   GI: soft, non-tender, non-distended, bowel sounds present. + ostomy  Extremities: No lower extremity edema.  Psychiatric: AAO x 3. Normal affect/mood.     LABS  CBC - WBC/HGB/HTC/PLT: 11.17/10.7/35.6/456 (02-20-24)  BMP - Na/K/Cl/Bicarb/BUN/Cr/Gluc/AG/eGFR: 136/3.8/102/24/23/0.78/264/10/97 (02-20-24)  Ca - 9.1 (02-20-24)  Phos - 3.5 (02-20-24)  Mg - 2.0 (02-20-24)  LFT - Alb/Tprot/Tbili/Dbili/AlkPhos/ALT/AST: 3.3/--/0.2/--/112/8/9 (02-16-24)  PT/aPTT/INR: 12.6/34.2/1.11 (02-16-24)   Thyroid Stimulating Hormone, Serum: 0.797 (12-31-23)      MEDICATIONS  MEDICATIONS  (STANDING):  acetaminophen     Tablet .. 975 milliGRAM(s) Oral every 8 hours  apixaban 5 milliGRAM(s) Oral every 12 hours  atorvastatin 80 milliGRAM(s) Oral at bedtime  clopidogrel Tablet 75 milliGRAM(s) Oral every 24 hours  dextrose 5%. 1000 milliLiter(s) (50 mL/Hr) IV Continuous <Continuous>  dextrose 50% Injectable 25 Gram(s) IV Push once  gabapentin 600 milliGRAM(s) Oral every 8 hours  glucagon  Injectable 1 milliGRAM(s) IntraMuscular once  insulin lispro (ADMELOG) corrective regimen sliding scale   SubCutaneous three times a day before meals  insulin lispro (ADMELOG) corrective regimen sliding scale   SubCutaneous at bedtime  insulin lispro Injectable (ADMELOG) 8 Unit(s) SubCutaneous three times a day before meals  insulin NPH human recombinant 5 Unit(s) SubCutaneous every 8 hours  lactobacillus acidophilus 1 Tablet(s) Oral daily  lidocaine   4% Patch 1 Patch Transdermal every 24 hours  metoprolol succinate ER 50 milliGRAM(s) Oral daily  nortriptyline 10 milliGRAM(s) Oral at bedtime  nystatin Powder 1 Application(s) Topical two times a day  pantoprazole    Tablet 40 milliGRAM(s) Oral two times a day  psyllium Powder 1 Packet(s) Oral two times a day    MEDICATIONS  (PRN):  dextrose Oral Gel 15 Gram(s) Oral once PRN Blood Glucose LESS THAN 70 milliGRAM(s)/deciliter  loperamide 2 milliGRAM(s) Oral every 6 hours PRN Diarrhea            SUBJECTIVE / INTERVAL HPI: Patient was seen and examined this morning. Ostomy reversal postponed for nutrition and glucose optimization. Stool output has decreased with imodium Pt continues to report nausea, abd pain, and blood at ostomy site. Didn't eat anything yesterday.      CAPILLARY BLOOD GLUCOSE & INSULIN RECEIVED  236 mg/dL (02-19 @ 18:04) - Lispro 8+4  289 mg/dL (02-19 @ 22:03) - NPH 5  264 mg/dL (02-20 @ 05:30)  234 mg/dL (02-20 @ 06:57) - NPH 5  245 mg/dL (02-20 @ 09:01) - Lispro 8+4. Ate cereal.      REVIEW OF SYSTEMS  Constitutional:  Negative fever, chills + loss of appetite.  Eyes:  Negative blurry vision or double vision.  Cardiovascular:  Negative for chest pain or palpitations.  Respiratory:  Negative for cough, wheezing, or shortness of breath.    Gastrointestinal:  Negative for  vomiting, diarrhea, constipation, + abdominal pain and nausea  Genitourinary:  Negative frequency, urgency or dysuria.  Neurologic:  No headache, confusion, dizziness, lightheadedness.    PHYSICAL EXAM  Vital Signs Last 24 Hrs  T(C): 36.8 (20 Feb 2024 12:22), Max: 37.4 (19 Feb 2024 12:49)  T(F): 98.3 (20 Feb 2024 12:22), Max: 99.4 (19 Feb 2024 12:49)  HR: 98 (20 Feb 2024 12:22) (91 - 108)  BP: 135/74 (20 Feb 2024 12:22) (126/78 - 149/81)  BP(mean): --  RR: 18 (20 Feb 2024 12:22) (16 - 18)  SpO2: 95% (20 Feb 2024 12:22) (95% - 97%)    Parameters below as of 20 Feb 2024 12:22  Patient On (Oxygen Delivery Method): room air    Constitutional: Awake, alert, in no acute distress. Obese  HEENT: Normocephalic, atraumatic, NAOMI.  Respiratory: Lungs clear to ausculation bilaterally.   Cardiovascular: regular rhythm, normal S1 and S2, no audible murmurs.   GI: soft, non-tender, non-distended, bowel sounds present. + ostomy  Extremities: No lower extremity edema.  Psychiatric: AAO x 3. Normal affect/mood.     LABS  CBC - WBC/HGB/HTC/PLT: 11.17/10.7/35.6/456 (02-20-24)  BMP - Na/K/Cl/Bicarb/BUN/Cr/Gluc/AG/eGFR: 136/3.8/102/24/23/0.78/264/10/97 (02-20-24)  Ca - 9.1 (02-20-24)  Phos - 3.5 (02-20-24)  Mg - 2.0 (02-20-24)  LFT - Alb/Tprot/Tbili/Dbili/AlkPhos/ALT/AST: 3.3/--/0.2/--/112/8/9 (02-16-24)  PT/aPTT/INR: 12.6/34.2/1.11 (02-16-24)   Thyroid Stimulating Hormone, Serum: 0.797 (12-31-23)      MEDICATIONS  MEDICATIONS  (STANDING):  acetaminophen     Tablet .. 975 milliGRAM(s) Oral every 8 hours  apixaban 5 milliGRAM(s) Oral every 12 hours  atorvastatin 80 milliGRAM(s) Oral at bedtime  clopidogrel Tablet 75 milliGRAM(s) Oral every 24 hours  dextrose 5%. 1000 milliLiter(s) (50 mL/Hr) IV Continuous <Continuous>  dextrose 50% Injectable 25 Gram(s) IV Push once  gabapentin 600 milliGRAM(s) Oral every 8 hours  glucagon  Injectable 1 milliGRAM(s) IntraMuscular once  insulin lispro (ADMELOG) corrective regimen sliding scale   SubCutaneous three times a day before meals  insulin lispro (ADMELOG) corrective regimen sliding scale   SubCutaneous at bedtime  insulin lispro Injectable (ADMELOG) 8 Unit(s) SubCutaneous three times a day before meals  insulin NPH human recombinant 5 Unit(s) SubCutaneous every 8 hours  lactobacillus acidophilus 1 Tablet(s) Oral daily  lidocaine   4% Patch 1 Patch Transdermal every 24 hours  metoprolol succinate ER 50 milliGRAM(s) Oral daily  nortriptyline 10 milliGRAM(s) Oral at bedtime  nystatin Powder 1 Application(s) Topical two times a day  pantoprazole    Tablet 40 milliGRAM(s) Oral two times a day  psyllium Powder 1 Packet(s) Oral two times a day    MEDICATIONS  (PRN):  dextrose Oral Gel 15 Gram(s) Oral once PRN Blood Glucose LESS THAN 70 milliGRAM(s)/deciliter  loperamide 2 milliGRAM(s) Oral every 6 hours PRN Diarrhea

## 2024-02-20 NOTE — PROGRESS NOTE ADULT - PROBLEM SELECTOR PROBLEM 3
Hypoglycemia
SIRS (systemic inflammatory response syndrome)
Hypoglycemia
Diarrhea
Hypoglycemia

## 2024-02-20 NOTE — PROGRESS NOTE ADULT - PROBLEM SELECTOR PLAN 1
Type 2 diabetes mellitus   - C-peptide, insulin, and inuslin abs ordered.  - Decrease NPH  units every 8 hours (6A, 2P, 10P)  - Continue lispro  units before meals.  - Continue lispro moderate dose sliding scale before meals and at bedtime.  - Patient's fingerstick glucose goal is 100-180 mg/dL.    - For discharge: Insulin dose TBD, stop ozempic due to c/f gastroparesis, stop metformin in setting of increase ostomy output.  - Patient can follow up at discharge with [facility] by calling [phone] to make an appointment.      Case discussed with Dr. Bowden. Primary team updated. Type 2 diabetes mellitus   - For discharge: Lantus 40 units at bedtime and lispro 15 units before meals. Stop U-500. Doses will need to be further adjusted as outpatient. Stop ozempic due to c/f gastroparesis, stop metformin in setting of increase ostomy output.  - Patient can follow up at discharge with Dr Joseph at [facility] by calling [phone] to make an appointment.  Office will contact for appt.     Case discussed with Dr. Bowden. Primary team updated.

## 2024-02-20 NOTE — PROGRESS NOTE ADULT - PROVIDER SPECIALTY LIST ADULT
Gastroenterology
Internal Medicine
Surgery
Surgery
Endocrinology
Endocrinology
Hospitalist
Surgery
Endocrinology
Endocrinology
Surgery
Surgery
Endocrinology
Internal Medicine

## 2024-02-20 NOTE — DISCHARGE NOTE NURSING/CASE MANAGEMENT/SOCIAL WORK - NSDCPEFALRISK_GEN_ALL_CORE
For information on Fall & Injury Prevention, visit: [email or web address] OR  [email or web address] OR  https://www.cdc.gov/steadi/patient.html

## 2024-02-20 NOTE — PROGRESS NOTE ADULT - PROBLEM SELECTOR PROBLEM 1
Diabetes
Abdominal pain with vomiting
Diabetes
Abdominal pain with vomiting
Diabetes
Abdominal pain with vomiting

## 2024-02-20 NOTE — PROGRESS NOTE ADULT - PROBLEM SELECTOR PLAN 10
F: None  E: K>4 Mg>2  N: CC  GI: Protonix  DVT: eliquis  Dispo: [facility]

## 2024-02-20 NOTE — PROGRESS NOTE ADULT - PROBLEM SELECTOR PLAN 2
Increased watery output from ostomy, associated with switching to liquids i/s/o nausea. Suspect that watery output is from having mostly liquids over the past few days vs osmolar shifts due to changes in glucose. Abd with mild tenderness in lower quadrants without guarding or rebound. GI PCR negative x2 and c diff negative. Pt refusing primary team interventions.  - encourage solid PO intake  - imodium prn for diarrhea, unlikely infectious etiology  - metamucil BID  - f/u surgery recs  - f/u stool osmolar gap  - monitor ostomy output (goal 1-1.2L)  - education on diet Increased watery output from ostomy, associated with switching to liquids i/s/o nausea. Suspect that watery output is from having mostly liquids over the past few days vs osmolar shifts due to changes in glucose. Abd with mild tenderness in lower quadrants without guarding or rebound. GI PCR negative x2 and c diff negative. Pt refusing primary team interventions.  - encourage solid PO intake  - imodium prn for diarrhea, unlikely infectious etiology  - metamucil BID  - f/u surgery recs- no plan for acute intervention   - monitor ostomy output (goal 1-1.2L)  - education on diet

## 2024-02-20 NOTE — PROGRESS NOTE ADULT - PROBLEM SELECTOR PLAN 9
F: None  E: K>4 Mg>2  N: CC  GI: Protonix  DVT: eliquis  Dispo: [facility]
Hx of PE on eliquis 5mg bid  - c/w home meds
F: None  E: K>4 Mg>2  N: CC  GI: Protonix  DVT: eliquis  Dispo: [facility]
Hx of PE on eliquis 5mg bid  - c/w home meds
F: D5W  E: K>4 Mg>2  N: CC  GI: Protonix  DVT: eliquis  Dispo: [facility]

## 2024-02-20 NOTE — PROGRESS NOTE ADULT - PROBLEM SELECTOR PROBLEM 9
Pulmonary embolism
Prophylactic measure
Pulmonary embolism

## 2024-02-20 NOTE — PROGRESS NOTE ADULT - PROBLEM SELECTOR PLAN 1
Pressure like pain mostly in lower quadrants associated with nausea, NBNB vomiting, and increased ostomy output. 1 small episode of dark and bright red blood in output at home. Lipase negative, EKG without ischemic changes. CT unremarkable. Although lactate was elevated, it cleared with fluids and patient is comfortable appearing on exam, lower suspicion for acute mesenteric ischemia. DDx include gastroparesis (however pt having diarrhea and not reduced motility) vs chronic mesenteric ischemia. GYN consulted to r/o vaginal prolapse as cause of sx, however low suspcion at this time and vaginal swabs w/ normal chrystal. GI and surgery consulted.  - reglan PRN for nausea (qtc 436)  - tylenol 975mg Q8h standing  - discontinued oxycodone  - avoiding NSAIDs due to bleeding from stoma  - f/u pancreatic elastase, calprotectin, and stool electrolytes to calculate osmolar gap Pressure like pain mostly in lower quadrants associated with nausea, NBNB vomiting, and increased ostomy output. 1 small episode of dark and bright red blood in output at home. Lipase negative, EKG without ischemic changes. CT unremarkable. Although lactate was elevated, it cleared with fluids and patient is comfortable appearing on exam, lower suspicion for acute mesenteric ischemia. DDx include gastroparesis (however pt having diarrhea and not reduced motility) vs chronic mesenteric ischemia. GYN consulted to r/o vaginal prolapse as cause of sx, however low suspcion at this time and vaginal swabs w/ normal chrystal. GI and surgery consulted.  - reglan PRN for nausea (qtc 436)  - tylenol 975mg Q8h standing  - discontinued oxycodone- pt needs outpt pain management fu with Dr. Rosario Zapata- [phone]  - avoiding NSAIDs due to bleeding from stoma  - f/u pancreatic elastase, calprotectin, and stool electrolytes to calculate osmolar gap

## 2024-02-20 NOTE — PROGRESS NOTE ADULT - ASSESSMENT
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

## 2024-02-21 LAB — CALPROTECTIN STL-MCNT: 136 UG/G — HIGH (ref 0–120)

## 2024-02-23 LAB
INSULIN ANTIBODIES: <5 UU/ML — SIGNIFICANT CHANGE UP
INSULIN FREE SERPL-ACNC: 49 UU/ML — HIGH
INSULIN: 50 UU/ML — SIGNIFICANT CHANGE UP

## 2024-02-26 DIAGNOSIS — Z91.148 PATIENT'S OTHER NONCOMPLIANCE WITH MEDICATION REGIMEN FOR OTHER REASON: ICD-10-CM

## 2024-02-26 DIAGNOSIS — T40.2X5A ADVERSE EFFECT OF OTHER OPIOIDS, INITIAL ENCOUNTER: ICD-10-CM

## 2024-02-26 DIAGNOSIS — E28.2 POLYCYSTIC OVARIAN SYNDROME: ICD-10-CM
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
## 2024-02-26 DIAGNOSIS — Z86.718 PERSONAL HISTORY OF OTHER VENOUS THROMBOSIS AND EMBOLISM: ICD-10-CM

## 2024-02-26 DIAGNOSIS — Z98.890 OTHER SPECIFIED POSTPROCEDURAL STATES: ICD-10-CM

## 2024-02-26 DIAGNOSIS — E86.0 DEHYDRATION: ICD-10-CM

## 2024-02-26 DIAGNOSIS — Z87.440 PERSONAL HISTORY OF URINARY (TRACT) INFECTIONS: ICD-10-CM

## 2024-02-26 DIAGNOSIS — D64.9 ANEMIA, UNSPECIFIED: ICD-10-CM

## 2024-02-26 DIAGNOSIS — Z79.01 LONG TERM (CURRENT) USE OF ANTICOAGULANTS: ICD-10-CM

## 2024-02-26 DIAGNOSIS — T38.3X5A ADVERSE EFFECT OF INSULIN AND ORAL HYPOGLYCEMIC [ANTIDIABETIC] DRUGS, INITIAL ENCOUNTER: ICD-10-CM

## 2024-02-26 DIAGNOSIS — I69.354 HEMIPLEGIA AND HEMIPARESIS FOLLOWING CEREBRAL INFARCTION AFFECTING LEFT NON-DOMINANT SIDE: ICD-10-CM

## 2024-02-26 DIAGNOSIS — E11.43 TYPE 2 DIABETES MELLITUS WITH DIABETIC AUTONOMIC (POLY)NEUROPATHY: ICD-10-CM

## 2024-02-26 DIAGNOSIS — Z79.84 LONG TERM (CURRENT) USE OF ORAL HYPOGLYCEMIC DRUGS: ICD-10-CM

## 2024-02-26 DIAGNOSIS — Z91.013 ALLERGY TO SEAFOOD: ICD-10-CM

## 2024-02-26 DIAGNOSIS — I69.351 HEMIPLEGIA AND HEMIPARESIS FOLLOWING CEREBRAL INFARCTION AFFECTING RIGHT DOMINANT SIDE: ICD-10-CM

## 2024-02-26 DIAGNOSIS — R19.7 DIARRHEA, UNSPECIFIED: ICD-10-CM

## 2024-02-26 DIAGNOSIS — E11.649 TYPE 2 DIABETES MELLITUS WITH HYPOGLYCEMIA WITHOUT COMA: ICD-10-CM

## 2024-02-26 DIAGNOSIS — Z79.899 OTHER LONG TERM (CURRENT) DRUG THERAPY: ICD-10-CM

## 2024-02-26 DIAGNOSIS — Z79.4 LONG TERM (CURRENT) USE OF INSULIN: ICD-10-CM

## 2024-02-26 DIAGNOSIS — Z86.711 PERSONAL HISTORY OF PULMONARY EMBOLISM: ICD-10-CM
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
## 2024-02-26 DIAGNOSIS — K94.11 ENTEROSTOMY HEMORRHAGE: ICD-10-CM

## 2024-02-26 DIAGNOSIS — E11.319 TYPE 2 DIABETES MELLITUS WITH UNSPECIFIED DIABETIC RETINOPATHY WITHOUT MACULAR EDEMA: ICD-10-CM

## 2024-02-26 DIAGNOSIS — E87.20 ACIDOSIS, UNSPECIFIED: ICD-10-CM

## 2024-02-26 DIAGNOSIS — Z53.09 PROCEDURE AND TREATMENT NOT CARRIED OUT BECAUSE OF OTHER CONTRAINDICATION: ICD-10-CM

## 2024-02-26 DIAGNOSIS — R65.10 SYSTEMIC INFLAMMATORY RESPONSE SYNDROME (SIRS) OF NON-INFECTIOUS ORIGIN WITHOUT ACUTE ORGAN DYSFUNCTION: ICD-10-CM

## 2024-02-26 DIAGNOSIS — E66.9 OBESITY, UNSPECIFIED: ICD-10-CM

## 2024-02-26 DIAGNOSIS — K31.84 GASTROPARESIS: ICD-10-CM

## 2024-02-26 DIAGNOSIS — Z91.018 ALLERGY TO OTHER FOODS: ICD-10-CM
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
## 2024-02-26 DIAGNOSIS — I25.10 ATHEROSCLEROTIC HEART DISEASE OF NATIVE CORONARY ARTERY WITHOUT ANGINA PECTORIS: ICD-10-CM

## 2024-02-26 DIAGNOSIS — Z88.0 ALLERGY STATUS TO PENICILLIN: ICD-10-CM

## 2024-02-26 DIAGNOSIS — E87.6 HYPOKALEMIA: ICD-10-CM

## 2024-02-26 DIAGNOSIS — T85.598A OTHER MECHANICAL COMPLICATION OF OTHER GASTROINTESTINAL PROSTHETIC DEVICES, IMPLANTS AND GRAFTS, INITIAL ENCOUNTER: ICD-10-CM

## 2024-02-26 DIAGNOSIS — Z79.82 LONG TERM (CURRENT) USE OF ASPIRIN: ICD-10-CM

## 2024-02-26 DIAGNOSIS — I10 ESSENTIAL (PRIMARY) HYPERTENSION: ICD-10-CM

## 2024-02-26 DIAGNOSIS — J45.909 UNSPECIFIED ASTHMA, UNCOMPLICATED: ICD-10-CM

## 2024-02-26 DIAGNOSIS — K59.03 DRUG INDUCED CONSTIPATION: ICD-10-CM

## 2024-02-26 DIAGNOSIS — K43.5 PARASTOMAL HERNIA WITHOUT OBSTRUCTION OR GANGRENE: ICD-10-CM

## 2024-02-26 DIAGNOSIS — E78.00 PURE HYPERCHOLESTEROLEMIA, UNSPECIFIED: ICD-10-CM

## 2024-02-26 DIAGNOSIS — Z88.1 ALLERGY STATUS TO OTHER ANTIBIOTIC AGENTS STATUS: ICD-10-CM

## 2024-03-11 NOTE — DIETITIAN INITIAL EVALUATION ADULT. - EST PROTEIN NEEDS7
73.2 Pt is 57y F with PMH fibroid myalgia, HTN on amlodipine complaining of HA. Pt reports waking up 0400 this morning to severe L sided HA, with onset of numbness of L arm, L leg, and L face 0700. Code stroke called 1017, . Reports partial relief of HA upon arrival, but endorses persistent numbness. Reports onset of L facial numbness this morning that worsened to L arm and then L leg. Reports increased difficulty ambulating due to worsening L leg weakness. Pt is 57y F with PMH fibroid myalgia, HTN on amlodipine complaining of HA. Pt reports waking up 0400 this morning to severe L sided HA, with onset of numbness of L arm, L leg, and L face 0700. Code stroke called 1017, . Reports partial relief of HA upon arrival, but endorses persistent numbness. Reports onset of L facial numbness this morning that worsened to L arm and then L leg. Reports increased difficulty ambulating due to worsening L leg weakness. HTN 180s/90s, all other vitals WNL. Denies changes in vision. 4mm PERLLA. Reports taking 324 asa this morning prior to arrival, reports partial relief of HA.

## 2024-03-25 ENCOUNTER — APPOINTMENT (OUTPATIENT)
Dept: ENDOCRINOLOGY | Facility: CLINIC | Age: 44
End: 2024-03-25
Payer: MEDICAID

## 2024-03-25 VITALS
DIASTOLIC BLOOD PRESSURE: 91 MMHG | HEIGHT: 66 IN | TEMPERATURE: 97.3 F | OXYGEN SATURATION: 98 % | BODY MASS INDEX: 37.93 KG/M2 | WEIGHT: 236 LBS | HEART RATE: 101 BPM | SYSTOLIC BLOOD PRESSURE: 179 MMHG

## 2024-03-25 DIAGNOSIS — Z79.4 TYPE 2 DIABETES MELLITUS WITH HYPERGLYCEMIA: ICD-10-CM

## 2024-03-25 DIAGNOSIS — E11.65 TYPE 2 DIABETES MELLITUS WITH HYPERGLYCEMIA: ICD-10-CM

## 2024-03-25 LAB — HBA1C MFR BLD HPLC: 8.8

## 2024-03-25 PROCEDURE — 83036 HEMOGLOBIN GLYCOSYLATED A1C: CPT | Mod: QW

## 2024-03-25 PROCEDURE — 99215 OFFICE O/P EST HI 40 MIN: CPT

## 2024-03-25 RX ORDER — BLOOD-GLUCOSE METER
70 EACH MISCELLANEOUS
Qty: 4 | Refills: 3 | Status: ACTIVE | COMMUNITY
Start: 2023-01-04 | End: 1900-01-01

## 2024-03-25 RX ORDER — PEN NEEDLE, DIABETIC 29 G X1/2"
32G X 4 MM NEEDLE, DISPOSABLE MISCELLANEOUS
Qty: 1 | Refills: 3 | Status: ACTIVE | COMMUNITY
Start: 2024-03-25 | End: 1900-01-01

## 2024-03-25 RX ORDER — BLOOD SUGAR DIAGNOSTIC
STRIP MISCELLANEOUS
Qty: 4 | Refills: 3 | Status: ACTIVE | COMMUNITY
Start: 2023-01-04 | End: 1900-01-01

## 2024-03-25 NOTE — HISTORY OF PRESENT ILLNESS
[Continuous Glucose Monitoring] : Continuous Glucose Monitoring: Yes [FreeTextEntry1] : Patient is a 45 yo woman with uncontrolled type 2 diabetes, HTN and HLD here for diabetes follow up. Patient's last visit was February 2023 with no-shows and cancellations for follow up  Type 2 diabetes diagnosed in 2005 based on blood work. In 2007, she was started metformin 1000 mg BID. In 2009, patient was pregnant and started on glyburide- did not require insulin for pregnancy. States that when she lost weight, diabetes got worse. Check sugars 3-4 times a day Patient has had multiple infections and hospitalizations. In 2022, she had SUPRAPUBIC NECROTIZING FASCITIS, then discharged to rehab. She has had a long, complicated journey with infections and sepsis. Reports that at some point she had a stroke and the sepsis required several surgeries. She was in the ICU as well. Patient has had colectomy and also doing suprapubic catheter She established endocrine care here October 2020. Over time, we changed insulin regimen to U500. In the interval she has was found to have a breast lump and is high risk for cancer. She was supposed to have surgery but it has been delayed.  She had biopsy and clips.  Patient still has been loss to follow up because she has had a lot of medical issues in between. The patient still has her colostomy bag and a hernia that need to be addressed.  She has DEXCOM device and  Since discharge from hospital, she takes admelog 30-35 TID with meals and U500 60 units three times a day Reports the numbers are better.  She had a low of 56, treated with coffee and 3 spoons of sugar, orange time.  This happened at 6 PM.  She cannot recall any inciting event but it happened after she took her nebulizer pump.  As such, she held her insulin. Patient confirmed on finger stick as well. Breakfast: crackers like always; 1-2 boiled eggs, Eggo waffles  Lunch: chicken soup Dinner: two scoops of rice; green bananas.   Loss to dilated eye exam: she is aware she has to go [FreeTextEntry5] : 326

## 2024-03-25 NOTE — REVIEW OF SYSTEMS
[Fatigue] : no fatigue [Dysphagia] : no dysphagia [Dysphonia] : no dysphonia [Nausea] : no nausea [Depression] : no depression

## 2024-03-25 NOTE — ASSESSMENT
[Diabetes Foot Care] : diabetes foot care [Long Term Vascular Complications] : long term vascular complications of diabetes [Carbohydrate Consistent Diet] : carbohydrate consistent diet [Importance of Diet and Exercise] : importance of diet and exercise to improve glycemic control, achieve weight loss and improve cardiovascular health [Hypoglycemia Management] : hypoglycemia management [Action and use of Insulin] : action and use of short and long-acting insulin [Self Monitoring of Blood Glucose] : self monitoring of blood glucose [Insulin Self-Administration] : insulin self-administration [Injection Technique, Storage, Sharps Disposal] : injection technique, storage, and sharps disposal [Retinopathy Screening] : Patient was referred to ophthalmology for retinopathy screening [FreeTextEntry1] : Patient is a 45 yo woman here for diabetes follow up.  Long visit as she has been loss to interval follow up. High medical decision making 1. Uncontrolled diabetes -POCT A1c today is 8.8%, her A1c three months ago was > 13% -the patient has had multiple hospitalizations for various reasons -she is at home now and states doing well -CGM data reviewed and average is 254 -continue current dose of U500 to 60 units TID with meals. Hypoglycemia education provided. Can also continue admelog 30 units TID with meals. Since discharge from hospital, she had one low yesterday. States it may have been because she only had soup for lunch. If hypoglycemia recurs, recommend decreasing admelog -increase ozempic for better weight management. Educated that if she goes for surgery, ozempic must be discontinued at least 2 weeks before -stay hydrated with water -check serum A1c, albumin/creatinine, CMP and lipid levels today -insulin and all supplies sent to pharmacy -dexamethasone suppression testing was normal and Cushing's ruled out Follow up in 3-4 months

## 2024-03-25 NOTE — PHYSICAL EXAM
[Alert] : alert [No Acute Distress] : no acute distress [No Respiratory Distress] : no respiratory distress [Clear to Auscultation] : lungs were clear to auscultation bilaterally [Normal Rate] : heart rate was normal [Normal Bowel Sounds] : normal bowel sounds [Not Tender] : non-tender [Soft] : abdomen soft [Normal Affect] : the affect was normal [Normal Insight/Judgement] : insight and judgment were intact [Normal Mood] : the mood was normal [de-identified] : LLQ colostomy bag

## 2024-03-26 LAB
ALBUMIN SERPL ELPH-MCNC: 3.7 G/DL
ALP BLD-CCNC: 155 U/L
ALT SERPL-CCNC: 7 U/L
ANION GAP SERPL CALC-SCNC: 14 MMOL/L
AST SERPL-CCNC: 10 U/L
BILIRUB SERPL-MCNC: <0.2 MG/DL
BUN SERPL-MCNC: 13 MG/DL
CALCIUM SERPL-MCNC: 9.2 MG/DL
CHLORIDE SERPL-SCNC: 102 MMOL/L
CHOLEST SERPL-MCNC: 223 MG/DL
CO2 SERPL-SCNC: 24 MMOL/L
CREAT SERPL-MCNC: 0.8 MG/DL
CREAT SPEC-SCNC: 138 MG/DL
EGFR: 93 ML/MIN/1.73M2
ESTIMATED AVERAGE GLUCOSE: 206 MG/DL
GLUCOSE SERPL-MCNC: 202 MG/DL
HBA1C MFR BLD HPLC: 8.8 %
HDLC SERPL-MCNC: 45 MG/DL
LDLC SERPL CALC-MCNC: 153 MG/DL
MICROALBUMIN 24H UR DL<=1MG/L-MCNC: 1.6 MG/DL
MICROALBUMIN/CREAT 24H UR-RTO: 12 MG/G
NONHDLC SERPL-MCNC: 178 MG/DL
POTASSIUM SERPL-SCNC: 3.9 MMOL/L
PROT SERPL-MCNC: 7 G/DL
SODIUM SERPL-SCNC: 139 MMOL/L
TRIGL SERPL-MCNC: 140 MG/DL

## 2024-04-04 RX ORDER — INSULIN LISPRO 100 U/ML
100 INJECTION, SOLUTION SUBCUTANEOUS
Qty: 8 | Refills: 3 | Status: DISCONTINUED | COMMUNITY
Start: 2020-11-06 | End: 2024-04-04

## 2024-04-10 VITALS
SYSTOLIC BLOOD PRESSURE: 165 MMHG | WEIGHT: 246.04 LBS | HEART RATE: 107 BPM | HEIGHT: 67 IN | OXYGEN SATURATION: 98 % | DIASTOLIC BLOOD PRESSURE: 90 MMHG | RESPIRATION RATE: 18 BRPM | TEMPERATURE: 99 F

## 2024-04-10 LAB
ALBUMIN SERPL ELPH-MCNC: 3.8 G/DL — SIGNIFICANT CHANGE UP (ref 3.3–5)
ALP SERPL-CCNC: 153 U/L — HIGH (ref 40–120)
ALT FLD-CCNC: 5 U/L — LOW (ref 10–45)
ANION GAP SERPL CALC-SCNC: 15 MMOL/L — SIGNIFICANT CHANGE UP (ref 5–17)
ANISOCYTOSIS BLD QL: SIGNIFICANT CHANGE UP
APPEARANCE UR: CLEAR — SIGNIFICANT CHANGE UP
AST SERPL-CCNC: 15 U/L — SIGNIFICANT CHANGE UP (ref 10–40)
BACTERIA # UR AUTO: ABNORMAL /HPF
BASOPHILS # BLD AUTO: 0.11 K/UL — SIGNIFICANT CHANGE UP (ref 0–0.2)
BASOPHILS NFR BLD AUTO: 0.9 % — SIGNIFICANT CHANGE UP (ref 0–2)
BILIRUB SERPL-MCNC: 0.2 MG/DL — SIGNIFICANT CHANGE UP (ref 0.2–1.2)
BILIRUB UR-MCNC: NEGATIVE — SIGNIFICANT CHANGE UP
BUN SERPL-MCNC: 7 MG/DL — SIGNIFICANT CHANGE UP (ref 7–23)
CALCIUM SERPL-MCNC: 9.1 MG/DL — SIGNIFICANT CHANGE UP (ref 8.4–10.5)
CAST: 0 /LPF — SIGNIFICANT CHANGE UP (ref 0–4)
CHLORIDE SERPL-SCNC: 103 MMOL/L — SIGNIFICANT CHANGE UP (ref 96–108)
CO2 SERPL-SCNC: 21 MMOL/L — LOW (ref 22–31)
COLOR SPEC: YELLOW — SIGNIFICANT CHANGE UP
CREAT SERPL-MCNC: 0.59 MG/DL — SIGNIFICANT CHANGE UP (ref 0.5–1.3)
DIFF PNL FLD: ABNORMAL
EGFR: 114 ML/MIN/1.73M2 — SIGNIFICANT CHANGE UP
EOSINOPHIL # BLD AUTO: 0 K/UL — SIGNIFICANT CHANGE UP (ref 0–0.5)
EOSINOPHIL NFR BLD AUTO: 0 % — SIGNIFICANT CHANGE UP (ref 0–6)
GIANT PLATELETS BLD QL SMEAR: PRESENT — SIGNIFICANT CHANGE UP
GLUCOSE SERPL-MCNC: 73 MG/DL — SIGNIFICANT CHANGE UP (ref 70–99)
GLUCOSE UR QL: NEGATIVE MG/DL — SIGNIFICANT CHANGE UP
HCG SERPL-ACNC: <1 MIU/ML — SIGNIFICANT CHANGE UP
HCT VFR BLD CALC: 38 % — SIGNIFICANT CHANGE UP (ref 34.5–45)
HGB BLD-MCNC: 10.8 G/DL — LOW (ref 11.5–15.5)
HYPOCHROMIA BLD QL: SIGNIFICANT CHANGE UP
KETONES UR-MCNC: NEGATIVE MG/DL — SIGNIFICANT CHANGE UP
LACTATE SERPL-SCNC: 2 MMOL/L — SIGNIFICANT CHANGE UP (ref 0.5–2)
LACTATE SERPL-SCNC: 3.5 MMOL/L — HIGH (ref 0.5–2)
LEUKOCYTE ESTERASE UR-ACNC: NEGATIVE — SIGNIFICANT CHANGE UP
LIDOCAIN IGE QN: 18 U/L — SIGNIFICANT CHANGE UP (ref 7–60)
LYMPHOCYTES # BLD AUTO: 19.1 % — SIGNIFICANT CHANGE UP (ref 13–44)
LYMPHOCYTES # BLD AUTO: 2.26 K/UL — SIGNIFICANT CHANGE UP (ref 1–3.3)
MAGNESIUM SERPL-MCNC: 1.8 MG/DL — SIGNIFICANT CHANGE UP (ref 1.6–2.6)
MANUAL SMEAR VERIFICATION: SIGNIFICANT CHANGE UP
MCHC RBC-ENTMCNC: 19.9 PG — LOW (ref 27–34)
MCHC RBC-ENTMCNC: 28.4 GM/DL — LOW (ref 32–36)
MCV RBC AUTO: 70.1 FL — LOW (ref 80–100)
MICROCYTES BLD QL: SIGNIFICANT CHANGE UP
MONOCYTES # BLD AUTO: 0.41 K/UL — SIGNIFICANT CHANGE UP (ref 0–0.9)
MONOCYTES NFR BLD AUTO: 3.5 % — SIGNIFICANT CHANGE UP (ref 2–14)
NEUTROPHILS # BLD AUTO: 8.83 K/UL — HIGH (ref 1.8–7.4)
NEUTROPHILS NFR BLD AUTO: 74.8 % — SIGNIFICANT CHANGE UP (ref 43–77)
NITRITE UR-MCNC: NEGATIVE — SIGNIFICANT CHANGE UP
OVALOCYTES BLD QL SMEAR: SLIGHT — SIGNIFICANT CHANGE UP
PH UR: 7.5 — SIGNIFICANT CHANGE UP (ref 5–8)
PLAT MORPH BLD: ABNORMAL
PLATELET # BLD AUTO: 513 K/UL — HIGH (ref 150–400)
POIKILOCYTOSIS BLD QL AUTO: SLIGHT — SIGNIFICANT CHANGE UP
POLYCHROMASIA BLD QL SMEAR: SLIGHT — SIGNIFICANT CHANGE UP
POTASSIUM SERPL-MCNC: 3.4 MMOL/L — LOW (ref 3.5–5.3)
POTASSIUM SERPL-SCNC: 3.4 MMOL/L — LOW (ref 3.5–5.3)
PROT SERPL-MCNC: 7.7 G/DL — SIGNIFICANT CHANGE UP (ref 6–8.3)
PROT UR-MCNC: 30 MG/DL
RBC # BLD: 5.42 M/UL — HIGH (ref 3.8–5.2)
RBC # FLD: 17.9 % — HIGH (ref 10.3–14.5)
RBC BLD AUTO: ABNORMAL
RBC CASTS # UR COMP ASSIST: 4 /HPF — SIGNIFICANT CHANGE UP (ref 0–4)
SODIUM SERPL-SCNC: 139 MMOL/L — SIGNIFICANT CHANGE UP (ref 135–145)
SP GR SPEC: 1.01 — SIGNIFICANT CHANGE UP (ref 1–1.03)
SQUAMOUS # UR AUTO: 4 /HPF — SIGNIFICANT CHANGE UP (ref 0–5)
UROBILINOGEN FLD QL: 1 MG/DL — SIGNIFICANT CHANGE UP (ref 0.2–1)
VARIANT LYMPHS # BLD: 1.7 % — SIGNIFICANT CHANGE UP (ref 0–6)
WBC # BLD: 11.81 K/UL — HIGH (ref 3.8–10.5)
WBC # FLD AUTO: 11.81 K/UL — HIGH (ref 3.8–10.5)
WBC UR QL: 7 /HPF — HIGH (ref 0–5)

## 2024-04-10 PROCEDURE — 99285 EMERGENCY DEPT VISIT HI MDM: CPT

## 2024-04-10 RX ORDER — MORPHINE SULFATE 50 MG/1
4 CAPSULE, EXTENDED RELEASE ORAL ONCE
Refills: 0 | Status: DISCONTINUED | OUTPATIENT
Start: 2024-04-10 | End: 2024-04-10

## 2024-04-10 RX ORDER — DIPHENHYDRAMINE HCL 50 MG
50 CAPSULE ORAL ONCE
Refills: 0 | Status: COMPLETED | OUTPATIENT
Start: 2024-04-10 | End: 2024-04-10

## 2024-04-10 RX ORDER — SODIUM CHLORIDE 9 MG/ML
1000 INJECTION, SOLUTION INTRAVENOUS
Refills: 0 | Status: DISCONTINUED | OUTPATIENT
Start: 2024-04-10 | End: 2024-04-11

## 2024-04-10 RX ORDER — SODIUM CHLORIDE 9 MG/ML
1000 INJECTION INTRAMUSCULAR; INTRAVENOUS; SUBCUTANEOUS ONCE
Refills: 0 | Status: COMPLETED | OUTPATIENT
Start: 2024-04-10 | End: 2024-04-10

## 2024-04-10 RX ORDER — DIPHENHYDRAMINE HCL 50 MG
50 CAPSULE ORAL ONCE
Refills: 0 | Status: DISCONTINUED | OUTPATIENT
Start: 2024-04-10 | End: 2024-04-10

## 2024-04-10 RX ORDER — ONDANSETRON 8 MG/1
4 TABLET, FILM COATED ORAL ONCE
Refills: 0 | Status: COMPLETED | OUTPATIENT
Start: 2024-04-10 | End: 2024-04-10

## 2024-04-10 RX ADMIN — MORPHINE SULFATE 4 MILLIGRAM(S): 50 CAPSULE, EXTENDED RELEASE ORAL at 22:47

## 2024-04-10 RX ADMIN — SODIUM CHLORIDE 150 MILLILITER(S): 9 INJECTION, SOLUTION INTRAVENOUS at 20:01

## 2024-04-10 RX ADMIN — MORPHINE SULFATE 4 MILLIGRAM(S): 50 CAPSULE, EXTENDED RELEASE ORAL at 18:12

## 2024-04-10 RX ADMIN — MORPHINE SULFATE 4 MILLIGRAM(S): 50 CAPSULE, EXTENDED RELEASE ORAL at 20:37

## 2024-04-10 RX ADMIN — SODIUM CHLORIDE 1000 MILLILITER(S): 9 INJECTION INTRAMUSCULAR; INTRAVENOUS; SUBCUTANEOUS at 23:34

## 2024-04-10 RX ADMIN — SODIUM CHLORIDE 1000 MILLILITER(S): 9 INJECTION INTRAMUSCULAR; INTRAVENOUS; SUBCUTANEOUS at 18:12

## 2024-04-10 RX ADMIN — ONDANSETRON 4 MILLIGRAM(S): 8 TABLET, FILM COATED ORAL at 18:12

## 2024-04-10 RX ADMIN — Medication 50 MILLIGRAM(S): at 23:34

## 2024-04-10 RX ADMIN — Medication 40 MILLIGRAM(S): at 20:37

## 2024-04-10 NOTE — ED PROVIDER NOTE - OBJECTIVE STATEMENT
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 presenting for 3 days of abdonminal pain, increased ostomy output and feelings of hypoglycemia. stopped taking insulin 2 days ago as she has not been eating much. hasnot taken metformin for 1 week.

## 2024-04-10 NOTE — ED PROVIDER NOTE - PHYSICAL EXAMINATION
General: Awake, alert and oriented.   Skin:  Appropriate color for ethnicity  HENMT: head normocephalic and atraumatic  EYES: Conjunctiva clear. nonicteric sclera  Cardiac: well perfused  Respiratory: breathing comfortably on room air  Abdominal: nondistended. ostomy present, empty. mild diffuse ttp, mostly around ostomy site.   MSK:  no visualized external signs of trauma. no apparent deficits in ROM of any extremity  Neurological: The patient is awake, alert and oriented with normal speech. CN 2-12 grossly intact. no apparent deficits. Memory is normal and thought process is intact. moving all extremities spontaneously   Psychiatric: Appropriate mood and affect. Good judgement and insight

## 2024-04-10 NOTE — ED ADULT NURSE NOTE - OBJECTIVE STATEMENT
44y F presents to ED c/o abdominal pain. Endorses abdominal pain, N/V, increased watery output to colostomy bag x3days. Pt also c/o decreased PO intake and hypoglycemia - has not taken insulin, states "when my sugar drops my heart races and that has been happening." Denies urinary sx, fevers/chills, CP/SOB, other acute sx at this time. Pt hypoglycemic on RN assessment, FS 48. Pt upgraded to MD zamora, given juice and food. Pt AAOx4, speaking in full and clear sentences, NAD at this time.

## 2024-04-10 NOTE — ED ADULT NURSE REASSESSMENT NOTE - NS ED NURSE REASSESS COMMENT FT1
Report received from Rubina ROBERTS. Introduced self to pt. pt aware of plan of care- awaiting predmedication for CT scan.

## 2024-04-10 NOTE — ED ADULT NURSE NOTE - NSFALLUNIVINTERV_ED_ALL_ED
Bed/Stretcher in lowest position, wheels locked, appropriate side rails in place/Call bell, personal items and telephone in reach/Instruct patient to call for assistance before getting out of bed/chair/stretcher/Non-slip footwear applied when patient is off stretcher/[place] to call system/Physically safe environment - no spills, clutter or unnecessary equipment/Purposeful proactive rounding/Room/bathroom lighting operational, light cord in reach

## 2024-04-10 NOTE — ED ADULT TRIAGE NOTE - WEIGHT IN KG
111.6 Azathioprine Pregnancy And Lactation Text: This medication is Pregnancy Category D and isn't considered safe during pregnancy. It is unknown if this medication is excreted in breast milk.

## 2024-04-10 NOTE — ED PROVIDER NOTE - CLINICAL SUMMARY MEDICAL DECISION MAKING FREE TEXT BOX
hypoglycemic repeatedly despite food itnake of grapes, pretzels, crackers in bet hypoglycemic episodes. as such maintence dextrosde started. recent admission for the same. will obtian ct, lactate downtrending, will require admission for repetitive hypoglycemia.

## 2024-04-10 NOTE — ED PROVIDER NOTE - PROGRESS NOTE DETAILS
Ree: pt received from night team at s/o; pt p/w hypoglycemia, given po with persistent hypoglycemia and started on D5 drip w/ stabilization of gluc. D5 drip discontinued w/ downtrending of glucose and pt restarted on drip. As per BILL, pt is unable to go to regional floor due to necessity of frequent FS. ICU consulted for tele admission. Pt given another trial off D5 drip with downtrending gluc again. Pt restarted on drip. Awaiting ICU consult recommendations. Will continue to monitor. Ree: Spoke w/ ICU and BILL who di/w Dr. Kiser; pt to be admitted to regional medicine with no need for tele monitoring.

## 2024-04-10 NOTE — ED ADULT TRIAGE NOTE - CHIEF COMPLAINT QUOTE
Patient PMH DM and CVA to the ED c/o worsening abdominal pain, chest pain, n/v, loose ostomy output x 3 days. . Speaking in complete sentences, AAOX4, NAD.

## 2024-04-11 ENCOUNTER — INPATIENT (INPATIENT)
Facility: HOSPITAL | Age: 44
LOS: 6 days | Discharge: HOME HEALTH | End: 2024-04-18
Attending: INTERNAL MEDICINE | Admitting: INTERNAL MEDICINE
Payer: MEDICAID

## 2024-04-11 DIAGNOSIS — Z98.890 OTHER SPECIFIED POSTPROCEDURAL STATES: Chronic | ICD-10-CM

## 2024-04-11 DIAGNOSIS — K43.5 PARASTOMAL HERNIA WITHOUT OBSTRUCTION OR GANGRENE: ICD-10-CM

## 2024-04-11 DIAGNOSIS — I25.10 ATHEROSCLEROTIC HEART DISEASE OF NATIVE CORONARY ARTERY WITHOUT ANGINA PECTORIS: ICD-10-CM

## 2024-04-11 DIAGNOSIS — Z93.9 ARTIFICIAL OPENING STATUS, UNSPECIFIED: ICD-10-CM

## 2024-04-11 DIAGNOSIS — R79.89 OTHER SPECIFIED ABNORMAL FINDINGS OF BLOOD CHEMISTRY: ICD-10-CM

## 2024-04-11 DIAGNOSIS — Z98.89 OTHER SPECIFIED POSTPROCEDURAL STATES: Chronic | ICD-10-CM

## 2024-04-11 DIAGNOSIS — E11.69 TYPE 2 DIABETES MELLITUS WITH OTHER SPECIFIED COMPLICATION: ICD-10-CM

## 2024-04-11 DIAGNOSIS — Z29.9 ENCOUNTER FOR PROPHYLACTIC MEASURES, UNSPECIFIED: ICD-10-CM

## 2024-04-11 DIAGNOSIS — Z93.9 ARTIFICIAL OPENING STATUS, UNSPECIFIED: Chronic | ICD-10-CM

## 2024-04-11 DIAGNOSIS — Z86.711 PERSONAL HISTORY OF PULMONARY EMBOLISM: ICD-10-CM

## 2024-04-11 DIAGNOSIS — E16.1 OTHER HYPOGLYCEMIA: ICD-10-CM

## 2024-04-11 DIAGNOSIS — K31.84 GASTROPARESIS: ICD-10-CM

## 2024-04-11 DIAGNOSIS — E11.9 TYPE 2 DIABETES MELLITUS WITHOUT COMPLICATIONS: ICD-10-CM

## 2024-04-11 LAB
ANION GAP SERPL CALC-SCNC: 10 MMOL/L — SIGNIFICANT CHANGE UP (ref 5–17)
BASOPHILS # BLD AUTO: 0.01 K/UL — SIGNIFICANT CHANGE UP (ref 0–0.2)
BASOPHILS NFR BLD AUTO: 0.1 % — SIGNIFICANT CHANGE UP (ref 0–2)
BUN SERPL-MCNC: 7 MG/DL — SIGNIFICANT CHANGE UP (ref 7–23)
CALCIUM SERPL-MCNC: 8.6 MG/DL — SIGNIFICANT CHANGE UP (ref 8.4–10.5)
CHLORIDE SERPL-SCNC: 103 MMOL/L — SIGNIFICANT CHANGE UP (ref 96–108)
CO2 SERPL-SCNC: 25 MMOL/L — SIGNIFICANT CHANGE UP (ref 22–31)
CREAT SERPL-MCNC: 0.58 MG/DL — SIGNIFICANT CHANGE UP (ref 0.5–1.3)
EGFR: 114 ML/MIN/1.73M2 — SIGNIFICANT CHANGE UP
EOSINOPHIL # BLD AUTO: 0.07 K/UL — SIGNIFICANT CHANGE UP (ref 0–0.5)
EOSINOPHIL NFR BLD AUTO: 0.5 % — SIGNIFICANT CHANGE UP (ref 0–6)
GLUCOSE BLDC GLUCOMTR-MCNC: 123 MG/DL — HIGH (ref 70–99)
GLUCOSE BLDC GLUCOMTR-MCNC: 193 MG/DL — HIGH (ref 70–99)
GLUCOSE BLDC GLUCOMTR-MCNC: 86 MG/DL — SIGNIFICANT CHANGE UP (ref 70–99)
GLUCOSE SERPL-MCNC: 208 MG/DL — HIGH (ref 70–99)
HCT VFR BLD CALC: 34.3 % — LOW (ref 34.5–45)
HGB BLD-MCNC: 9.7 G/DL — LOW (ref 11.5–15.5)
IMM GRANULOCYTES NFR BLD AUTO: 0.5 % — SIGNIFICANT CHANGE UP (ref 0–0.9)
LYMPHOCYTES # BLD AUTO: 1.94 K/UL — SIGNIFICANT CHANGE UP (ref 1–3.3)
LYMPHOCYTES # BLD AUTO: 13.7 % — SIGNIFICANT CHANGE UP (ref 13–44)
MAGNESIUM SERPL-MCNC: 1.8 MG/DL — SIGNIFICANT CHANGE UP (ref 1.6–2.6)
MCHC RBC-ENTMCNC: 20 PG — LOW (ref 27–34)
MCHC RBC-ENTMCNC: 28.3 GM/DL — LOW (ref 32–36)
MCV RBC AUTO: 70.7 FL — LOW (ref 80–100)
MONOCYTES # BLD AUTO: 0.89 K/UL — SIGNIFICANT CHANGE UP (ref 0–0.9)
MONOCYTES NFR BLD AUTO: 6.3 % — SIGNIFICANT CHANGE UP (ref 2–14)
NEUTROPHILS # BLD AUTO: 11.18 K/UL — HIGH (ref 1.8–7.4)
NEUTROPHILS NFR BLD AUTO: 78.9 % — HIGH (ref 43–77)
NRBC # BLD: 0 /100 WBCS — SIGNIFICANT CHANGE UP (ref 0–0)
PHOSPHATE SERPL-MCNC: 3 MG/DL — SIGNIFICANT CHANGE UP (ref 2.5–4.5)
PLATELET # BLD AUTO: 515 K/UL — HIGH (ref 150–400)
POTASSIUM SERPL-MCNC: 3.6 MMOL/L — SIGNIFICANT CHANGE UP (ref 3.5–5.3)
POTASSIUM SERPL-SCNC: 3.6 MMOL/L — SIGNIFICANT CHANGE UP (ref 3.5–5.3)
RBC # BLD: 4.85 M/UL — SIGNIFICANT CHANGE UP (ref 3.8–5.2)
RBC # FLD: 17.4 % — HIGH (ref 10.3–14.5)
SODIUM SERPL-SCNC: 138 MMOL/L — SIGNIFICANT CHANGE UP (ref 135–145)
WBC # BLD: 14.16 K/UL — HIGH (ref 3.8–10.5)
WBC # FLD AUTO: 14.16 K/UL — HIGH (ref 3.8–10.5)

## 2024-04-11 PROCEDURE — 74177 CT ABD & PELVIS W/CONTRAST: CPT | Mod: 26,MC

## 2024-04-11 PROCEDURE — 99223 1ST HOSP IP/OBS HIGH 75: CPT | Mod: GC

## 2024-04-11 RX ORDER — SODIUM CHLORIDE 9 MG/ML
1000 INJECTION, SOLUTION INTRAVENOUS
Refills: 0 | Status: DISCONTINUED | OUTPATIENT
Start: 2024-04-11 | End: 2024-04-11

## 2024-04-11 RX ORDER — KETOROLAC TROMETHAMINE 30 MG/ML
30 SYRINGE (ML) INJECTION ONCE
Refills: 0 | Status: DISCONTINUED | OUTPATIENT
Start: 2024-04-11 | End: 2024-04-11

## 2024-04-11 RX ORDER — SODIUM CHLORIDE 9 MG/ML
1000 INJECTION, SOLUTION INTRAVENOUS
Refills: 0 | Status: DISCONTINUED | OUTPATIENT
Start: 2024-04-11 | End: 2024-04-12

## 2024-04-11 RX ORDER — CLOPIDOGREL BISULFATE 75 MG/1
75 TABLET, FILM COATED ORAL DAILY
Refills: 0 | Status: DISCONTINUED | OUTPATIENT
Start: 2024-04-11 | End: 2024-04-18

## 2024-04-11 RX ORDER — INSULIN LISPRO 100/ML
VIAL (ML) SUBCUTANEOUS
Refills: 0 | Status: DISCONTINUED | OUTPATIENT
Start: 2024-04-11 | End: 2024-04-11

## 2024-04-11 RX ORDER — GABAPENTIN 400 MG/1
600 CAPSULE ORAL THREE TIMES A DAY
Refills: 0 | Status: DISCONTINUED | OUTPATIENT
Start: 2024-04-11 | End: 2024-04-18

## 2024-04-11 RX ORDER — GLUCAGON INJECTION, SOLUTION 0.5 MG/.1ML
1 INJECTION, SOLUTION SUBCUTANEOUS ONCE
Refills: 0 | Status: DISCONTINUED | OUTPATIENT
Start: 2024-04-11 | End: 2024-04-18

## 2024-04-11 RX ORDER — SODIUM CHLORIDE 9 MG/ML
1000 INJECTION, SOLUTION INTRAVENOUS
Refills: 0 | Status: DISCONTINUED | OUTPATIENT
Start: 2024-04-11 | End: 2024-04-18

## 2024-04-11 RX ORDER — HYDROMORPHONE HYDROCHLORIDE 2 MG/ML
1 INJECTION INTRAMUSCULAR; INTRAVENOUS; SUBCUTANEOUS ONCE
Refills: 0 | Status: DISCONTINUED | OUTPATIENT
Start: 2024-04-11 | End: 2024-04-11

## 2024-04-11 RX ORDER — INSULIN LISPRO 100/ML
VIAL (ML) SUBCUTANEOUS AT BEDTIME
Refills: 0 | Status: DISCONTINUED | OUTPATIENT
Start: 2024-04-11 | End: 2024-04-11

## 2024-04-11 RX ORDER — FAMOTIDINE 10 MG/ML
20 INJECTION INTRAVENOUS ONCE
Refills: 0 | Status: COMPLETED | OUTPATIENT
Start: 2024-04-11 | End: 2024-04-11

## 2024-04-11 RX ORDER — APIXABAN 2.5 MG/1
5 TABLET, FILM COATED ORAL EVERY 12 HOURS
Refills: 0 | Status: DISCONTINUED | OUTPATIENT
Start: 2024-04-11 | End: 2024-04-18

## 2024-04-11 RX ORDER — NORTRIPTYLINE HYDROCHLORIDE 10 MG/1
10 CAPSULE ORAL AT BEDTIME
Refills: 0 | Status: DISCONTINUED | OUTPATIENT
Start: 2024-04-11 | End: 2024-04-18

## 2024-04-11 RX ORDER — DEXTROSE 10 % IN WATER 10 %
125 INTRAVENOUS SOLUTION INTRAVENOUS ONCE
Refills: 0 | Status: DISCONTINUED | OUTPATIENT
Start: 2024-04-11 | End: 2024-04-18

## 2024-04-11 RX ORDER — ACETAMINOPHEN 500 MG
1000 TABLET ORAL ONCE
Refills: 0 | Status: COMPLETED | OUTPATIENT
Start: 2024-04-11 | End: 2024-04-11

## 2024-04-11 RX ORDER — METOCLOPRAMIDE HCL 10 MG
5 TABLET ORAL ONCE
Refills: 0 | Status: COMPLETED | OUTPATIENT
Start: 2024-04-11 | End: 2024-04-11

## 2024-04-11 RX ORDER — ATORVASTATIN CALCIUM 80 MG/1
80 TABLET, FILM COATED ORAL AT BEDTIME
Refills: 0 | Status: DISCONTINUED | OUTPATIENT
Start: 2024-04-11 | End: 2024-04-18

## 2024-04-11 RX ORDER — DEXTROSE 50 % IN WATER 50 %
12.5 SYRINGE (ML) INTRAVENOUS ONCE
Refills: 0 | Status: DISCONTINUED | OUTPATIENT
Start: 2024-04-11 | End: 2024-04-18

## 2024-04-11 RX ORDER — DEXTROSE 50 % IN WATER 50 %
25 SYRINGE (ML) INTRAVENOUS ONCE
Refills: 0 | Status: DISCONTINUED | OUTPATIENT
Start: 2024-04-11 | End: 2024-04-18

## 2024-04-11 RX ORDER — DEXTROSE 50 % IN WATER 50 %
15 SYRINGE (ML) INTRAVENOUS ONCE
Refills: 0 | Status: DISCONTINUED | OUTPATIENT
Start: 2024-04-11 | End: 2024-04-18

## 2024-04-11 RX ORDER — INFLUENZA VIRUS VACCINE 15; 15; 15; 15 UG/.5ML; UG/.5ML; UG/.5ML; UG/.5ML
0.5 SUSPENSION INTRAMUSCULAR ONCE
Refills: 0 | Status: DISCONTINUED | OUTPATIENT
Start: 2024-04-11 | End: 2024-04-18

## 2024-04-11 RX ADMIN — GABAPENTIN 600 MILLIGRAM(S): 400 CAPSULE ORAL at 12:33

## 2024-04-11 RX ADMIN — FAMOTIDINE 20 MILLIGRAM(S): 10 INJECTION INTRAVENOUS at 12:33

## 2024-04-11 RX ADMIN — ATORVASTATIN CALCIUM 80 MILLIGRAM(S): 80 TABLET, FILM COATED ORAL at 22:42

## 2024-04-11 RX ADMIN — HYDROMORPHONE HYDROCHLORIDE 1 MILLIGRAM(S): 2 INJECTION INTRAMUSCULAR; INTRAVENOUS; SUBCUTANEOUS at 22:42

## 2024-04-11 RX ADMIN — CLOPIDOGREL BISULFATE 75 MILLIGRAM(S): 75 TABLET, FILM COATED ORAL at 12:32

## 2024-04-11 RX ADMIN — Medication 30 MILLIGRAM(S): at 19:43

## 2024-04-11 RX ADMIN — GABAPENTIN 600 MILLIGRAM(S): 400 CAPSULE ORAL at 22:42

## 2024-04-11 RX ADMIN — Medication 5 MILLIGRAM(S): at 09:54

## 2024-04-11 RX ADMIN — HYDROMORPHONE HYDROCHLORIDE 1 MILLIGRAM(S): 2 INJECTION INTRAMUSCULAR; INTRAVENOUS; SUBCUTANEOUS at 04:11

## 2024-04-11 RX ADMIN — HYDROMORPHONE HYDROCHLORIDE 1 MILLIGRAM(S): 2 INJECTION INTRAMUSCULAR; INTRAVENOUS; SUBCUTANEOUS at 23:30

## 2024-04-11 RX ADMIN — Medication 400 MILLIGRAM(S): at 14:39

## 2024-04-11 RX ADMIN — SODIUM CHLORIDE 100 MILLILITER(S): 9 INJECTION, SOLUTION INTRAVENOUS at 09:57

## 2024-04-11 RX ADMIN — HYDROMORPHONE HYDROCHLORIDE 1 MILLIGRAM(S): 2 INJECTION INTRAMUSCULAR; INTRAVENOUS; SUBCUTANEOUS at 08:06

## 2024-04-11 RX ADMIN — SODIUM CHLORIDE 1000 MILLILITER(S): 9 INJECTION, SOLUTION INTRAVENOUS at 04:00

## 2024-04-11 RX ADMIN — SODIUM CHLORIDE 50 MILLILITER(S): 9 INJECTION, SOLUTION INTRAVENOUS at 13:17

## 2024-04-11 RX ADMIN — HYDROMORPHONE HYDROCHLORIDE 1 MILLIGRAM(S): 2 INJECTION INTRAMUSCULAR; INTRAVENOUS; SUBCUTANEOUS at 06:39

## 2024-04-11 RX ADMIN — SODIUM CHLORIDE 50 MILLILITER(S): 9 INJECTION, SOLUTION INTRAVENOUS at 12:33

## 2024-04-11 RX ADMIN — APIXABAN 5 MILLIGRAM(S): 2.5 TABLET, FILM COATED ORAL at 18:43

## 2024-04-11 RX ADMIN — Medication 200 MILLIGRAM(S): at 13:09

## 2024-04-11 RX ADMIN — NORTRIPTYLINE HYDROCHLORIDE 10 MILLIGRAM(S): 10 CAPSULE ORAL at 22:42

## 2024-04-11 RX ADMIN — Medication 30 MILLIGRAM(S): at 18:43

## 2024-04-11 RX ADMIN — SODIUM CHLORIDE 100 MILLILITER(S): 9 INJECTION, SOLUTION INTRAVENOUS at 18:43

## 2024-04-11 RX ADMIN — HYDROMORPHONE HYDROCHLORIDE 1 MILLIGRAM(S): 2 INJECTION INTRAMUSCULAR; INTRAVENOUS; SUBCUTANEOUS at 00:34

## 2024-04-11 NOTE — H&P ADULT - ATTENDING COMMENTS
Patient seen and examined by me. Case discussed with resident and agree with the resident's findings and plan as documented in the resident's note.    -endocrine consulted and will f/u their recs  -c/w D5/LR drip, adjusting rate for goal of BGs in mid 100s  -GI PCR to r/o infectious gastroenteritis   -Monitor off antibiotics

## 2024-04-11 NOTE — CONSULT NOTE ADULT - ASSESSMENT
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  In ED, patient was tachycardic but VS otherwise stable. Her BG was initially 46 and she had a mild WBC and elevated lactate. She improved on a D5 drip and was trialed off, however BG started downtrending again and was restarted. Hypoglycemia likely from the long half life of her U-500 in conjunction with her gastroparesis, insulin binding antibodies and reactive hypoglycemia as she still has significant endogenous insulin production, increased ostomy output likely from "dumping like syndrome" from overtreatment of her low blood glucoses.     #IDDM  #Gastroparesis  #Reactive hypoglycemia   Most recent POCT A1c 8%, likely inaccurate given her labile sugars and frequently low sugars. C peptide 9.7 in May of 2023. Patient likely with gastroparesis fair leading to nausea and vomiting and poor PO and likely hypoglycemia from poor PO and endogenous insulin production  - Repeat A1c and C peptide  - Gradually decrease her D5/LR drip, currently at 100cc/hr, aiming to keep BGs in mid 100s  - q6hr FSG  - Endocrine consulted, recommendations on her basal insulin for tonight needed, appreciate recommendations  - PRN loperamide and anti- nausea medications  - QTc >500, holding off on further reglan. Repeat EKG. PRN Tigan ordered   - GI PCR to r/o infectious gastroenteritis   - Monitor off antibiotics   - cw home nortriptyline and gabapentin   - U500 insulin unlikely to be a good option for patient moving forward  - Discontinue ozempic given gastroparesis flairs    A1C: 8.8% (March 2024) 10.8 % (Feb 2024)  C-peptide 9.7 and Insulin abs negative (May 2023)  BUN: 7  Creatinine: 0.59  GFR: 114  Weight: 111.6  BMI: 38.5   43F PMH with prior CVA in 2021 (some mild residual left sided weakness), PE in 2023 (on Eliquis), multiple abdominal hernia repairs, nec fasc of abdomen in 2021 s/p corbin procedure w diverting ostomy, IDDM2 with complications ( likely gastroparesis) and recent improved A1c 16--->8, CAD with multiple stents (last in June 2023), presenting with nausea and vomiting for 4 days, increased ostomy output, lower abdominal pain and symptomatic hypoglycemia, being admitted for management of her hypoglycemia.  In ED, patient was tachycardic but VS otherwise stable. Her BG was initially 46 and she had a mild WBC and elevated lactate. She improved on a D5 drip and was trialed off, however BG started downtrending again and was restarted. Suspect current episode triggered by restarting ozempic at increased dose.   Of note, patient is aggressively trying to achieve A1C <8% in order to have hernia repaired.    A1C: 8.8% (March 2024),  10.8 % (Feb 2024), 16% (Jan 1, 2024)  C-peptide 9.7 and Insulin abs negative (May 2023)  BUN: 7  Creatinine: 0.59  GFR: 114  Weight: 111.6  BMI: 38.5

## 2024-04-11 NOTE — H&P ADULT - NSHPLABSRESULTS_GEN_ALL_CORE
LABS:                         10.8   11.81 )-----------( 513      ( 10 Apr 2024 18:16 )             38.0     04-10    139  |  103  |  7   ----------------------------<  73  3.4<L>   |  21<L>  |  0.59    Ca    9.1      10 Apr 2024 18:16  Mg     1.8     10    TPro  7.7  /  Alb  3.8  /  TBili  0.2  /  DBili  x   /  AST  15  /  ALT  5<L>  /  AlkPhos  153<H>  -10      Urinalysis Basic - ( 10 Apr 2024 20:59 )    Color: Yellow / Appearance: Clear / S.013 / pH: x  Gluc: x / Ketone: Negative mg/dL  / Bili: Negative / Urobili: 1.0 mg/dL   Blood: x / Protein: 30 mg/dL / Nitrite: Negative   Leuk Esterase: Negative / RBC: 4 /HPF / WBC 7 /HPF   Sq Epi: x / Non Sq Epi: 4 /HPF / Bacteria: Moderate /HPF                RADIOLOGY, EKG & ADDITIONAL TESTS:

## 2024-04-11 NOTE — PATIENT PROFILE ADULT - FALL HARM RISK - RISK INTERVENTIONS

## 2024-04-11 NOTE — H&P ADULT - NSHPPHYSICALEXAM_GEN_ALL_CORE
------------------INCOMPLETE-----------------------  VITAL SIGNS:  T(C): 37.1 (04-11-24 @ 07:56), Max: 37.2 (04-10-24 @ 15:57)  T(F): 98.7 (04-11-24 @ 07:56), Max: 99 (04-10-24 @ 15:57)  HR: 109 (04-11-24 @ 07:56) (95 - 110)  BP: 124/72 (04-11-24 @ 07:56) (124/72 - 170/85)  BP(mean): --  RR: 18 (04-11-24 @ 07:56) (16 - 18)  SpO2: 98% (04-11-24 @ 07:56) (97% - 99%)  Wt(kg): --    PHYSICAL EXAM:    Constitutional: WDWN resting comfortably in bed; NAD  HEENT: NC/AT, PERRL, EOMI, anicteric sclera, no nasal discharge; uvula midline, no oropharyngeal erythema or exudates, MMM; no thyromegaly or anterior/posterior or submental AMPARO; neck supple  Respiratory: CTA B/L; no W/R/R, no retractions  Cardiac: +S1/S2; RRR; no M/R/G appreciated  Gastrointestinal: abdomen soft, NT/ND, +BS, no rebound tenderness or guarding  Back: no CVAT B/L  Extremities: legs WWP, no clubbing or cyanosis; no peripheral edema  Vascular: 2+ distal pedal pulses B/L  Dermatologic: skin warm, dry and intact; no rashes, wounds, or scars  Neurologic: AAOx3; CNII-XII grossly intact; strength 5/5 and sensation intact in all 4 extremities; no focal deficits  Psychiatric: affect and characteristics of appearance, verbalizations, behaviors are appropriate

## 2024-04-11 NOTE — H&P ADULT - TIME BILLING
Time-based billing (NON-critical care).     More than 50% of the visit was spent counseling and / or coordinating care by the attending physician.      The necessity of the time spent during the encounter on this date of service was due to: documentation in [place], reviewing chart and coordinating care with patient/resident and interdisciplinary staff (such as , social workers, etc) as well as reviewing vitals, laboratory data, radiology, medication list, consultants' recommendations and prior records. Interventions were performed as documented above.

## 2024-04-11 NOTE — H&P ADULT - ASSESSMENT
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  In ED, patient was tachycardic but VS otherwise stable. Her BG was initially 46 and she had a mild WBC and elevated lactate. She improved on a D5 drip and was trialed off, however BG started downtrending again and was restarted. Hypoglycemia likely from the long half life of her U-500 in conjunction with her gastroparesis, insulin binding antibodies and reactive hypoglycemia as she still has significant endogenous insulin production, increased ostomy output likely from "dumping like syndrome" from overtreatment of her low blood glucoses.     #IDDM  #Gastroparesis  #Reactive hypoglycemia   Most recent POCT A1c 8%, likely inaccurate given her labile sugars and frequently low sugars. C peptide 9.7 in May of 2023. Patient likely with gastroparesis fair leading to nausea and vomiting and poor PO and likely hypoglycemia from poor PO and endogenous insulin production  - Repeat A1c and C peptide  - Gradually decrease her D5/LR drip, currently at 100cc/hr, aiming to keep BGs in mid 100s  - q6hr FSG  - Endocrine consulted, recommendations on her basal insulin for tonight needed, appreciate recommendations  - PRN loperamide and anti- nausea medications  - QTc >500, holding off on further reglan. Repeat EKG. PRN Tigan ordered   - GI PCR to r/o infectious gastroenteritis   - Monitor off antibiotics   - cw home nortriptyline and gabapentin   - U500 insulin unlikely to be a good option for patient moving forward  - Discontinue ozempic given gastroparesis flairs    #Prior PE  cw eliquis     #Prior CAD  cw plavix and metoprolol       DVT: Lovenox  Diet: CC with ensures   43F PMH with prior CVA in 2021 (some mild residual left sided weakness), PE in 2023 (on Eliquis), multiple abdominal hernia repairs, nec fasc of abdomen in 2021 s/p corbin procedure w diverting ostomy, IDDM2 with complications ( likely gastroparesis) and recent improved A1c 16--->8, CAD with multiple stents (last in June 2023), presenting with nausea and vomiting for 4 days, increased ostomy output, lower abdominal pain and symptomatic hypoglycemia, being admitted for management of her hypoglycemia.  In ED, patient was tachycardic but VS otherwise stable. Her BG was initially 46 and she had a mild WBC and elevated lactate. She improved on a D5 drip and was trialed off, however BG started downtrending again and was restarted. Hypoglycemia likely from the long half life of her U-500 in conjunction with her gastroparesis, insulin binding antibodies and reactive hypoglycemia as she still has significant endogenous insulin production, increased ostomy output likely from "dumping like syndrome" from overtreatment of her low blood glucoses.     #IDDM  #Gastroparesis  #Reactive hypoglycemia   Most recent POCT A1c 8%, likely inaccurate given her labile sugars and frequently low sugars. C peptide 9.7 in May of 2023. Patient likely with gastroparesis fair leading to nausea and vomiting and poor PO and likely hypoglycemia from poor PO and endogenous insulin production  - Repeat A1c and C peptide  - Gradually decrease her D5/LR drip, currently at 100cc/hr, aiming to keep BGs in mid 100s  - q6hr FSG  - Endocrine consulted, recommendations on her basal insulin for tonight needed, appreciate recommendations  - PRN loperamide and anti- nausea medications  - QTc >500, holding off on further reglan. Repeat EKG. PRN Tigan ordered   - GI PCR to r/o infectious gastroenteritis   - Monitor off antibiotics   - cw home nortriptyline and gabapentin   - U500 insulin unlikely to be a good option for patient moving forward  - Discontinue ozempic given gastroparesis flairs    #Prior PE  cw eliquis     #Prior CAD  cw plavix and metoprolol       DVT: AC  Diet: CC with ensures

## 2024-04-11 NOTE — CONSULT NOTE ADULT - PROBLEM SELECTOR RECOMMENDATION 9
Type 2 diabetes mellitus with hyperglycemia  - Please continue lantus *** units at bedtime.   - Continue lispro *** units before each meal.  - Continue lispro moderate dose sliding scale before meals and at bedtime.  - Patient's fingerstick glucose goal is 100-180 mg/dL.    - For discharge, patient can ***.    - Patient can follow up at discharge with Dr Joseph on 7/22/2024 At 10AM at [facility].    Case discussed with Dr. Rose. Primary team updated. Type 2 diabetes mellitus with hyperglycemia  - Start lispro low dose sliding scale before melas and at bedtime.  - If dinner FSG is stable, please stop dextrose IVF.   - If bedtime FSG >200, start Lantus 10 units  - Patient can follow up at discharge with Dr Joseph on 7/22/2024 At 10AM at [facility].    Case discussed with Dr. Rose. Primary team updated.

## 2024-04-11 NOTE — CONSULT NOTE ADULT - NS ATTEND AMEND GEN_ALL_CORE FT
The patiens readmitted again for nausea and vomiting with history of CVA, CAD with multiple stents and past CVA. She has a diverting osteotomy secondary to multiple hernia repairs with complications .Endocrine consultation was requested for Diabetes control.       In March Hgb A1C was 8.6%.Glucose levels were 119-48-97=[phone].she is on a glucose drip.       I agree with 10 units Lantus Insulin  with SS and tapering glucose drip.

## 2024-04-11 NOTE — H&P ADULT - HISTORY OF PRESENT ILLNESS
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 Patient was here in February with a very similar presentation and was seen by various consultants including GI, GYN, Surgery, and endocrinology, Nothing GYN related was found to be contributing to her symptoms. Vaginal cultures were obtained and were negative. GI saw that patient, however infectious chua was negative and gastroparesis was thought to be the causing her presentation and she was given prn reglan with some symptomatic improvement. Surgery was consulted initially for bloody ostomy output, however colonoscopy was deferred due to improving ostomy output color and likely source was friable stoma. They later were called back due to her having a parastomal hernia, however surgical team deferred until diabetes further optimized as her A1c was still well above goal for surgery.    Endocrine was consulted given her symptomatic hypoglycemia and her marked insulin-resistant on U-500 insulin. They determined that her labile blood glucoses were likely from the long half life of her U-500 in conjunction with her gastroparesis, insulin binding antibodies and reactive hypoglycemia as she still has significant endogenous insulin production. Prior to this admission she had not followed up with her outpatient endocrinology for nearly a year and was taking ozempic, metformin, U-500 insulin 70 TID and Lispro 40 TID. Her insulin requirements were not high enough to warrant U500 insulin during her last stay and she was advised to stop metformin and ozempic and her insulin regimen was changed to lantus/lispro 40 & 15 u TID. She was also told to closely monitor her dexcom and educated on proper snacking and timing as they felt that she was overtreating her hypoglycemic episodes with juice which was leading to ostomtic changes and increased ostomy output. Since discharge, patient has reestablished with Dr. Nisha Joseph several weeks ago. Was apparently still taking her old regimen of admelog and U500 as well as ozempic after the discharge and was still having occasional low blood glucoses. A1c was improved to 8% POCT, and her average CGM was 254.     Four days ago, patient states that she began having nausea. She is not sure if she ate something abnormal. Throughout that day, her BG was higher than normal reports 200-300 and she attributes this to her abdominal pain. The following day she began having more nausea and vomiting. She was trying to do the yogurt and juice snacks but she had issues keeping things down. Due to not eating she had stopped all her insulin. She also noted her ostomy output increasing and her sugars trending down into the mid 100s. Her ostomy output had increased to 6-7 bag changes per day compared to her normal 2-3. Reports it being clear. Yesterday she began feeling lightheaded, sweaty and palpitations and noted her sugars to be <70. In ED, patient was tachycardic but VS otherwise stable. Her BG was initially 46 and she had a mild WBC and elevated lactate. She improved on a D5 drip and was trialed off, however BG started downtrending again and was restarted.

## 2024-04-11 NOTE — CONSULT NOTE ADULT - SUBJECTIVE AND OBJECTIVE BOX
HISTORY OF PRESENT ILLNESS  43F PMH with prior CVA in 2021 (some mild residual left sided weakness), PE in 2023 (on Eliquis), multiple abdominal hernia repairs, nec fasc of abdomen in 2021 s/p corbin procedure w diverting ostomy, IDDM2 with complications ( likely gastroparesis) and recent improved A1c 16--->8, CAD with multiple stents (last in June 2023), presenting with nausea and vomiting for 4 days, increased ostomy output, lower abdominal pain and symptomatic hypoglycemia, being admitted for management of her hypoglycemia. Patient was here in February with a very similar presentation and was seen by various consultants including GI, GYN, Surgery, and endocrinology, Nothing GYN related was found to be contributing to her symptoms. Vaginal cultures were obtained and were negative. GI saw that patient, however infectious chua was negative and gastroparesis was thought to be the causing her presentation and she was given prn reglan with some symptomatic improvement. Surgery was consulted initially for bloody ostomy output, however colonoscopy was deferred due to improving ostomy output color and likely source was friable stoma. They later were called back due to her having a parastomal hernia, however surgical team deferred until diabetes further optimized as her A1c was still well above goal for surgery.    Endocrine was consulted given her symptomatic hypoglycemia and her marked insulin-resistant on U-500 insulin. They determined that her labile blood glucoses were likely from the long half life of her U-500 in conjunction with her gastroparesis, insulin binding antibodies and reactive hypoglycemia as she still has significant endogenous insulin production. Prior to this admission she had not followed up with her outpatient endocrinology for nearly a year and was taking ozempic, metformin, U-500 insulin 70 TID and Lispro 40 TID. Her insulin requirements were not high enough to warrant U500 insulin during her last stay and she was advised to stop metformin and ozempic and her insulin regimen was changed to lantus/lispro 40 & 15 u TID. She was also told to closely monitor her dexcom and educated on proper snacking and timing as they felt that she was overtreating her hypoglycemic episodes with juice which was leading to ostomtic changes and increased ostomy output. Since discharge, patient has reestablished with Dr. Nisha Joseph several weeks ago. Was apparently still taking her old regimen of admelog and U500 as well as ozempic after the discharge and was still having occasional low blood glucoses. A1c was improved to 8% POCT, and her average CGM was 254.     Four days ago, patient states that she began having nausea. She is not sure if she ate something abnormal. Throughout that day, her BG was higher than normal reports 200-300 and she attributes this to her abdominal pain. The following day she began having more nausea and vomiting. She was trying to do the yogurt and juice snacks but she had issues keeping things down. Due to not eating she had stopped all her insulin. She also noted her ostomy output increasing and her sugars trending down into the mid 100s. Her ostomy output had increased to 6-7 bag changes per day compared to her normal 2-3. Reports it being clear. Yesterday she began feeling lightheaded, sweaty and palpitations and noted her sugars to be <70. In ED, patient was tachycardic but VS otherwise stable. Her BG was initially 46 and she had a mild WBC and elevated lactate. She improved on a D5 drip and was trialed off, however BG started downtrending again and was restarted.     Feb 2024:  43y Female with type 2 insulin-requiring DM, markedly insulin-resistant on U-500 insulin, admitted with hypoglycemia secondary to N/V for the past 24 hours.    Labile glucose likely from long half life of U-500 v gastroparesis v insulin binding antibodies v reactive hypoglycemia (continues to have good amount of endogenous insulin, c peptide 9.7)    Advised her to watch her dexcom, and if SG is 100 with down arrow, to treat with a mixed snack like yogurt parfait, instead of waiting until it is <70 and overtreating with juice which can cause more osmotic changes and can be contributing to ostomy output, and also increased risk for reactive hypoglycemia as she is making a lot of endogenous insulin that will respond to glucose (juice) bolus.    At time of discharge, insulin doses are not high enough to warrant U-500 so recommend transitioning to basal/bolus. Stop metformin and ozempic as to no further exacerbate GI symptoms. Discussed taking short actin insulin with any carb ingested, including milk and that juice should only be used for hypoglycemia treatment.    A1C: 10.8 % (16.4 % six weeks ago)  C-peptide 9.7 (May 2023)  Insulin and insulin abs pending results  BUN: 23  Creatinine: 0.78  GFR: 97  Weight: 101.2  BMI: 34.9     Problem/Plan - 1:  ·  Problem: Diabetes.   ·  Plan: Type 2 diabetes mellitus   - For discharge: Lantus 40 units at bedtime and lispro 15 units before meals. Stop U-500. Doses will need to be further adjusted as outpatient. Stop ozempic due to c/f gastroparesis, stop metformin in setting of increase ostomy output.  - Patient can follow up at discharge with Dr Joseph at [facility] by calling [phone] to make an appointment.  Office will contact for appt.     Case discussed with Dr. Bowden. Primary team updated.    Dr Joseph (March 2024)  -POCT A1c today is 8.8%, her A1c three months ago was > 13%  -the patient has had multiple hospitalizations for various reasons  -she is at home now and states doing well  -CGM data reviewed and average is 254  -continue current dose of U500 to 60 units TID with meals. Hypoglycemia education provided. Can also continue admelog 30 units TID with meals. Since discharge from hospital, she had one low yesterday. States it may have been because she only had soup for lunch. If hypoglycemia recurs, recommend decreasing admelog  -increase ozempic for better weight management. Educated that if she goes for surgery, ozempic must be discontinued at least 2 weeks before    CAPILLARY BLOOD GLUCOSE & INSULIN RECEIVED  119 mg/dL (04-10 @ 15:58)  48 mg/dL (04-10 @ 17:17)  73 mg/dL (04-10 @ 18:16)  62 mg/dL (04-10 @ 18:19)  64 mg/dL (04-10 @ 19:21)  80 mg/dL (04-10 @ 21:23)  84 mg/dL (04-10 @ 23:19)  141 mg/dL (04-11 @ 01:13)  178 mg/dL (04-11 @ 03:59)  194 mg/dL (04-11 @ 05:27)  146 mg/dL (04-11 @ 06:42)  168 mg/dL (04-11 @ 08:40)    ALLERGIES  iodine containing compounds (Hives; Anaphylaxis)  fish (Hives; Urticaria)  shellfish. (Hives; Anaphylaxis)  clindamycin (Pruritus)  vancomycin (Anaphylaxis; Hives)  penicillin (Hives)  amoxicillin (Short breath; Rash)  Bactrim I.V. (Rash (Mod to Severe))    CURRENT MEDICATIONS  apixaban 5 milliGRAM(s) Oral every 12 hours  atorvastatin 80 milliGRAM(s) Oral at bedtime  clopidogrel Tablet 75 milliGRAM(s) Oral daily  dextrose 10% Bolus 125 milliLiter(s) IV Bolus once  dextrose 5% + lactated ringers. 1000 milliLiter(s) IV Continuous <Continuous>  dextrose 5%. 1000 milliLiter(s) IV Continuous <Continuous>  dextrose 5%. 1000 milliLiter(s) IV Continuous <Continuous>  dextrose 50% Injectable 12.5 Gram(s) IV Push once  dextrose 50% Injectable 25 Gram(s) IV Push once  dextrose Oral Gel 15 Gram(s) Oral once PRN  famotidine Injectable 20 milliGRAM(s) IV Push once  gabapentin 600 milliGRAM(s) Oral three times a day  glucagon  Injectable 1 milliGRAM(s) IntraMuscular once  nortriptyline 10 milliGRAM(s) Oral at bedtime  trimethobenzamide Injectable 200 milliGRAM(s) IntraMuscular every 8 hours PRN    REVIEW OF SYSTEMS  Constitutional:  Negative fever, chills or loss of appetite.  Eyes:  Negative blurry vision or double vision.  Cardiovascular:  Negative for chest pain or palpitations.  Respiratory:  Negative for cough, wheezing, or shortness of breath.   Gastrointestinal:  Negative for nausea, vomiting, diarrhea, constipation, or abdominal pain.  Genitourinary:  Negative frequency, urgency or dysuria.  Neurologic:  No headache, confusion, dizziness, lightheadedness.    PHYSICAL EXAM  Vital Signs Last 24 Hrs  T(C): 37.1 (11 Apr 2024 07:56), Max: 37.2 (10 Apr 2024 15:57)  T(F): 98.7 (11 Apr 2024 07:56), Max: 99 (10 Apr 2024 15:57)  HR: 109 (11 Apr 2024 07:56) (95 - 110)  BP: 124/72 (11 Apr 2024 07:56) (124/72 - 170/85)  BP(mean): --  RR: 18 (11 Apr 2024 07:56) (16 - 18)  SpO2: 98% (11 Apr 2024 07:56) (97% - 99%)    Parameters below as of 11 Apr 2024 07:56  Patient On (Oxygen Delivery Method): room air    Constitutional: Awake, alert, in no acute distress.   HEENT: Normocephalic, atraumatic, NAOMI, no proptosis or lid retraction.   Neck: supple, no acanthosis, no thyromegaly or palpable thyroid nodules.  Respiratory: Lungs clear to ausculation bilaterally.   Cardiovascular: regular rhythm, normal S1 and S2, no audible murmurs.   GI: soft, non-tender, non-distended, bowel sounds present, no masses appreciated.  Extremities: No lower extremity edema, peripheral pulses present.   Skin: no rashes.   Psychiatric: AAO x 3. Normal affect/mood.     LABS  CBC - WBC/HGB/HTC/PLT: 11.81/10.8/38.0/513 (04-10-24)  BMP: Na/K/Cl/Bicarb/BUN/Cr/Gluc: 139/3.4/103/21/7/0.59/73 (04-10-24)  Anion Gap: 15 (04-10-24)  eGFR: 114 (04-10-24)  Calcium: 9.1 (04-10-24)  Phosphorus: -- (04-10-24)  Magnesium: 1.8 (04-10-24)  LFT - Alb/Tprot/Tbili/Dbili/AlkPhos/ALT/AST: 3.8/--/0.2/--/153/5/15 (04-10-24)    Thyroid Stimulating Hormone, Serum: 0.797 (12-31-23)   HISTORY OF PRESENT ILLNESS  43F PMH with prior CVA in 2021 (some mild residual left sided weakness), PE in 2023 (on Eliquis), multiple abdominal hernia repairs, nec fasc of abdomen in 2021 s/p corbin procedure w diverting ostomy, DM2 with complications (maybe gastroparesis) and recent improved A1c 16--->8, CAD with multiple stents (last in June 2023), presenting with nausea and vomiting for 4 days, increased ostomy output, lower abdominal pain and symptomatic hypoglycemia, being admitted for management of her hypoglycemia. Patient was here in February with a very similar presentation and was seen by various consultants including GI, GYN, Surgery, and endocrinology, Nothing GYN related was found to be contributing to her symptoms. Vaginal cultures were obtained and were negative. GI saw that patient, however infectious chua was negative and gastroparesis was thought to be the causing her presentation and she was given prn reglan with some symptomatic improvement. Surgery was consulted initially for bloody ostomy output, however colonoscopy was deferred due to improving ostomy output color and likely source was friable stoma. They later were called back due to her having a parastomal hernia, however surgical team deferred until diabetes further optimized as her A1c was still well above goal for surgery.    She is well known to endocrine service, last seen in February 2024 for similar presentation with symptomatic hypoglycemia and marked insulin-resistant on U-500 insulin. Prior to the last admission she had not followed up with her outpatient endocrinology for nearly a year and was taking ozempic, metformin, U-500 insulin 70 TID and Lispro 40 TID. We determined that her labile blood glucoses were likely from the long half life of her U-500 in conjunction with her probable gastroparesis (no formal diagnosis), and reactive hypoglycemia as she still has significant endogenous insulin production and insulin binding antibodies are negative. At discharge in February, her insulin requirements were not high enough to warrant U500 insulin and her insulin regimen was changed to lantus/lispro 40 & 15 u TID. She was also advised to stop metformin and ozempic. She was also told to closely monitor her dexcom and educated on proper snacking and timing as they felt that she was overtreating her hypoglycemic episodes with juice which was leading to ostomtic changes and increased ostomy output. Since discharge, patient has reestablished with Dr. Nisha Joseph several weeks ago. Per Dr Joseph's note she apparently was still taking her old regimen of admelog and U500 as well as ozempic after the discharge and was still having occasional low blood glucoses. A1c was improved to 8% POCT, and her average CGM was 254.   However, patient currently reports she had been taking Lantus 40 units and lispro 15 units as ordered at discharge. She stopped metformin. She had continued Ozempic 0.5mg weekly, but ran out about 4 weeks ago. She was off completely for 2 weeks, and then restarted at higher dose of 1mg approx 2 weeks ago after seeing Dr Joseph. She reports she had been doing well on this regimen until about 4 days ago when she started feeling unwell. Four days ago, patient states that she began having nausea. She is not sure if she ate something abnormal. Throughout that day, her BG was higher than normal reports 200-300 and she attributes this to her abdominal pain. The following day she began having more nausea and vomiting. She was trying to do the yogurt and juice snacks but she had issues keeping things down. Due to not eating she had stopped all her insulin. Last dose of Lantus was Monday night. She also noted her ostomy output increasing and her sugars trending down into the mid 100s. Her ostomy output had increased to 6-7 bag changes per day compared to her normal 2-3. Reports it being clear. Yesterday she began feeling lightheaded, sweaty and palpitations and noted her sugars to be <70. She cannot identify a trigger for this episode. Glucose had been fairly stable prior to episode, with occasional low glucose during day when she was out and not having regular meals. She uses dexcom reader so unable to evaluate more the 24 hours of glucose readings. No report downloaded from last endocrine visit in the clinic.    In ED, patient was tachycardic but VS otherwise stable. Her BG was initially 46 and she had a mild WBC and elevated lactate. She improved on a D5 drip and was trialed off, however BG started downtrending again and was restarted.   She was given one dose of solumedrol for contrast allergy on 4/10 at 7PM.    CAPILLARY BLOOD GLUCOSE & INSULIN RECEIVED  119 mg/dL (04-10 @ 15:58)  48 mg/dL (04-10 @ 17:17)  73 mg/dL (04-10 @ 18:16)  62 mg/dL (04-10 @ 18:19)  64 mg/dL (04-10 @ 19:21) - Solumedrol 40 and D5LR at 150ml/hr  80 mg/dL (04-10 @ 21:23)  84 mg/dL (04-10 @ 23:19)  141 mg/dL (04-11 @ 01:13)  178 mg/dL (04-11 @ 03:59)  194 mg/dL (04-11 @ 05:27) - D5 Discontinued  146 mg/dL (04-11 @ 06:42)  168 mg/dL (04-11 @ 08:40) - D5 restarted at 100ml/hr at 10AM  196 mg/dL (04-11 @ 12PM) - D5 decrease to 50ml/hr.  Diet started but she doesn't feel like eating.      ALLERGIES  iodine containing compounds (Hives; Anaphylaxis)  fish (Hives; Urticaria)  shellfish. (Hives; Anaphylaxis)  clindamycin (Pruritus)  vancomycin (Anaphylaxis; Hives)  penicillin (Hives)  amoxicillin (Short breath; Rash)  Bactrim I.V. (Rash (Mod to Severe))    CURRENT MEDICATIONS  apixaban 5 milliGRAM(s) Oral every 12 hours  atorvastatin 80 milliGRAM(s) Oral at bedtime  clopidogrel Tablet 75 milliGRAM(s) Oral daily  dextrose 10% Bolus 125 milliLiter(s) IV Bolus once  dextrose 5% + lactated ringers. 1000 milliLiter(s) IV Continuous <Continuous>  dextrose 5%. 1000 milliLiter(s) IV Continuous <Continuous>  dextrose 5%. 1000 milliLiter(s) IV Continuous <Continuous>  dextrose 50% Injectable 12.5 Gram(s) IV Push once  dextrose 50% Injectable 25 Gram(s) IV Push once  dextrose Oral Gel 15 Gram(s) Oral once PRN  famotidine Injectable 20 milliGRAM(s) IV Push once  gabapentin 600 milliGRAM(s) Oral three times a day  glucagon  Injectable 1 milliGRAM(s) IntraMuscular once  nortriptyline 10 milliGRAM(s) Oral at bedtime  trimethobenzamide Injectable 200 milliGRAM(s) IntraMuscular every 8 hours PRN    REVIEW OF SYSTEMS  Constitutional:  Negative fever, chills + loss of appetite.  Eyes:  Negative blurry vision or double vision.  Cardiovascular:  Negative for chest pain or palpitations.  Respiratory:  Negative for cough, wheezing, or shortness of breath.   Gastrointestinal:  Negative for vomiting, diarrhea, constipation, or abdominal pain. + nausea  Genitourinary:  Negative frequency, urgency or dysuria.  Neurologic:  No headache, confusion, dizziness, lightheadedness.    PHYSICAL EXAM  Vital Signs Last 24 Hrs  T(C): 37.1 (11 Apr 2024 07:56), Max: 37.2 (10 Apr 2024 15:57)  T(F): 98.7 (11 Apr 2024 07:56), Max: 99 (10 Apr 2024 15:57)  HR: 109 (11 Apr 2024 07:56) (95 - 110)  BP: 124/72 (11 Apr 2024 07:56) (124/72 - 170/85)  BP(mean): --  RR: 18 (11 Apr 2024 07:56) (16 - 18)  SpO2: 98% (11 Apr 2024 07:56) (97% - 99%)    Parameters below as of 11 Apr 2024 07:56  Patient On (Oxygen Delivery Method): room air      Constitutional: Awake, alert, in no acute distress. Obese  HEENT: Normocephalic, atraumatic, NAOMI.  Respiratory: Lungs clear to ausculation bilaterally.   Cardiovascular: regular rhythm, normal S1 and S2, no audible murmurs.   GI: soft, non-tender, non-distended, bowel sounds present. + ostomy  Extremities: No lower extremity edema.  Psychiatric: AAO x 3. Normal affect/mood    LABS  CBC - WBC/HGB/HTC/PLT: 11.81/10.8/38.0/513 (04-10-24)  BMP: Na/K/Cl/Bicarb/BUN/Cr/Gluc: 139/3.4/103/21/7/0.59/73 (04-10-24)  Anion Gap: 15 (04-10-24)  eGFR: 114 (04-10-24)  Calcium: 9.1 (04-10-24)  Phosphorus: -- (04-10-24)  Magnesium: 1.8 (04-10-24)  LFT - Alb/Tprot/Tbili/Dbili/AlkPhos/ALT/AST: 3.8/--/0.2/--/153/5/15 (04-10-24)    Thyroid Stimulating Hormone, Serum: 0.797 (12-31-23)

## 2024-04-12 LAB
A1C WITH ESTIMATED AVERAGE GLUCOSE RESULT: 8.2 % — HIGH (ref 4–5.6)
C PEPTIDE SERPL-MCNC: 3.1 NG/ML — SIGNIFICANT CHANGE UP (ref 1.1–4.4)
ESTIMATED AVERAGE GLUCOSE: 189 MG/DL — HIGH (ref 68–114)
GI PCR PANEL: SIGNIFICANT CHANGE UP
GLUCOSE BLDC GLUCOMTR-MCNC: 130 MG/DL — HIGH (ref 70–99)
GLUCOSE BLDC GLUCOMTR-MCNC: 133 MG/DL — HIGH (ref 70–99)
GLUCOSE BLDC GLUCOMTR-MCNC: 168 MG/DL — HIGH (ref 70–99)
GLUCOSE BLDC GLUCOMTR-MCNC: 168 MG/DL — HIGH (ref 70–99)
GLUCOSE BLDC GLUCOMTR-MCNC: 54 MG/DL — CRITICAL LOW (ref 70–99)
GLUCOSE BLDC GLUCOMTR-MCNC: 54 MG/DL — CRITICAL LOW (ref 70–99)
GLUCOSE BLDC GLUCOMTR-MCNC: 55 MG/DL — LOW (ref 70–99)
GLUCOSE BLDC GLUCOMTR-MCNC: 81 MG/DL — SIGNIFICANT CHANGE UP (ref 70–99)
GLUCOSE BLDC GLUCOMTR-MCNC: 85 MG/DL — SIGNIFICANT CHANGE UP (ref 70–99)

## 2024-04-12 PROCEDURE — 76937 US GUIDE VASCULAR ACCESS: CPT | Mod: 26

## 2024-04-12 PROCEDURE — 99233 SBSQ HOSP IP/OBS HIGH 50: CPT | Mod: GC

## 2024-04-12 PROCEDURE — 36000 PLACE NEEDLE IN VEIN: CPT

## 2024-04-12 RX ORDER — DEXTROSE 50 % IN WATER 50 %
25 SYRINGE (ML) INTRAVENOUS ONCE
Refills: 0 | Status: COMPLETED | OUTPATIENT
Start: 2024-04-12 | End: 2024-04-12

## 2024-04-12 RX ORDER — SODIUM CHLORIDE 9 MG/ML
1000 INJECTION, SOLUTION INTRAVENOUS
Refills: 0 | Status: DISCONTINUED | OUTPATIENT
Start: 2024-04-12 | End: 2024-04-13

## 2024-04-12 RX ORDER — ACETAMINOPHEN 500 MG
650 TABLET ORAL EVERY 6 HOURS
Refills: 0 | Status: DISCONTINUED | OUTPATIENT
Start: 2024-04-12 | End: 2024-04-12

## 2024-04-12 RX ORDER — ACETAMINOPHEN 500 MG
650 TABLET ORAL EVERY 6 HOURS
Refills: 0 | Status: DISCONTINUED | OUTPATIENT
Start: 2024-04-12 | End: 2024-04-18

## 2024-04-12 RX ORDER — KETOROLAC TROMETHAMINE 30 MG/ML
15 SYRINGE (ML) INJECTION ONCE
Refills: 0 | Status: DISCONTINUED | OUTPATIENT
Start: 2024-04-12 | End: 2024-04-12

## 2024-04-12 RX ADMIN — Medication 25 MILLILITER(S): at 03:41

## 2024-04-12 RX ADMIN — Medication 25 GRAM(S): at 08:35

## 2024-04-12 RX ADMIN — APIXABAN 5 MILLIGRAM(S): 2.5 TABLET, FILM COATED ORAL at 06:43

## 2024-04-12 RX ADMIN — Medication 650 MILLIGRAM(S): at 16:39

## 2024-04-12 RX ADMIN — GABAPENTIN 600 MILLIGRAM(S): 400 CAPSULE ORAL at 06:44

## 2024-04-12 RX ADMIN — GABAPENTIN 600 MILLIGRAM(S): 400 CAPSULE ORAL at 14:27

## 2024-04-12 RX ADMIN — Medication 25 MILLILITER(S): at 00:56

## 2024-04-12 RX ADMIN — Medication 15 MILLIGRAM(S): at 14:26

## 2024-04-12 RX ADMIN — CLOPIDOGREL BISULFATE 75 MILLIGRAM(S): 75 TABLET, FILM COATED ORAL at 11:19

## 2024-04-12 RX ADMIN — ATORVASTATIN CALCIUM 80 MILLIGRAM(S): 80 TABLET, FILM COATED ORAL at 22:09

## 2024-04-12 RX ADMIN — APIXABAN 5 MILLIGRAM(S): 2.5 TABLET, FILM COATED ORAL at 17:24

## 2024-04-12 RX ADMIN — Medication 15 MILLIGRAM(S): at 14:56

## 2024-04-12 RX ADMIN — SODIUM CHLORIDE 125 MILLILITER(S): 9 INJECTION, SOLUTION INTRAVENOUS at 12:09

## 2024-04-12 RX ADMIN — GABAPENTIN 600 MILLIGRAM(S): 400 CAPSULE ORAL at 22:09

## 2024-04-12 RX ADMIN — NORTRIPTYLINE HYDROCHLORIDE 10 MILLIGRAM(S): 10 CAPSULE ORAL at 22:10

## 2024-04-12 RX ADMIN — Medication 650 MILLIGRAM(S): at 16:09

## 2024-04-12 NOTE — PROGRESS NOTE ADULT - ASSESSMENT
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  In ED, patient was tachycardic but VS otherwise stable. Her BG was initially 46 and she had a mild WBC and elevated lactate. She improved on a D5 drip and was trialed off, however BG started downtrending again and was restarted. Suspect current episode triggered by restarting ozempic at increased dose.   Of note, patient is aggressively trying to achieve A1C <8% in order to have hernia repaired.    A1C: 8.8% (March 2024),  10.8 % (Feb 2024), 16% (Jan 1, 2024)  C-peptide 9.7 and Insulin abs negative (May 2023)  BUN: 7  Creatinine: 0.58  GFR: 114  Weight: 111.6  BMI: 38.5

## 2024-04-12 NOTE — PROGRESS NOTE ADULT - PROBLEM SELECTOR PLAN 1
Type 2 diabetes mellitus with hyperglycemia  - Start lispro low dose sliding scale before melas and at bedtime.  - If dinner FSG is stable, please stop dextrose IVF.   - If bedtime FSG >200, start Lantus 10 units  - Patient can follow up at discharge with Dr Joseph on 7/22/2024 At 10AM at [facility].    Case discussed with Dr. Rose. Primary team updated. Type 2 diabetes mellitus with hyperglycemia  - Continue to titrate dextrose IVF off as able. Limit dextrose IVP if able to reduce reactive hypoglycemia.  - Obtain am cortisol  - If able to titrate dextrose IVF off and FSG falls below <55 mg/dL OFF of IVF, please obtain the following prior to treatment: C-peptide, insulin, proinsulin, BMP, BHB.  - Once dextrose is off, start lispro low dose sliding scale before meals and at bedtime.  - Patient can follow up at discharge with Dr Joseph on 7/22/2024 At 10AM at [facility].    Case discussed with Dr. Rose. Primary team updated.

## 2024-04-12 NOTE — PROGRESS NOTE ADULT - PROBLEM SELECTOR PLAN 2
Most recent POCT A1c 8%, likely inaccurate given her labile sugars and frequently low sugars. C peptide 9.7 in May of 2023. Patient likely with gastroparesis fair leading to nausea and vomiting and poor PO and likely hypoglycemia from poor PO and endogenous insulin production. Home medication for gastroparesis: Gabapentin 600mg PO TID and Nortriptyline 10mg PO qhs    - appreciate endo recs  - f/u CELIA antibody, C peptide, A1c  - d/c Ozempic inpatient  - c/w home meds for gastroparesis Most recent POCT A1c 8%, likely inaccurate given her labile sugars and frequently low sugars. C peptide 9.7 in May of 2023. Patient likely with gastroparesis fair leading to nausea and vomiting and poor PO and likely hypoglycemia from poor PO and endogenous insulin production. Home medication for gastroparesis: Gabapentin 600mg PO TID and Nortriptyline 10mg PO qhs. Home medications: Ozempic 1g qweek (last injection 4/2), Lantus 40u qhs, Lispro 15u TID with meals. Several episodes of BG <60 since admission, no BG levels >200    - hold off insulin, f/u endo recs  - d/c Ozempic  - f/u CELIA antibody, C peptide, A1c  - d/c Ozempic inpatient  - c/w home meds for gastroparesis Most recent POCT A1c 8%, likely inaccurate given her labile sugars and frequently low sugars. C peptide 9.7 in May of 2023. Patient likely with gastroparesis fair leading to nausea and vomiting and poor PO and likely hypoglycemia from poor PO and endogenous insulin production. Home medication for gastroparesis: Gabapentin 600mg PO TID and Nortriptyline 10mg PO qhs. Home medications: Ozempic 1g qweek (last injection 4/2), Lantus 40u qhs, Lispro 15u TID with meals. Several episodes of BG <60 since admission, no BG levels >200    - hold off insulin, f/u endo recs  - f/u CELIA antibody, C peptide, A1c  - d/c Ozempic inpatient  - c/w home meds for gastroparesis

## 2024-04-12 NOTE — PROGRESS NOTE ADULT - ASSESSMENT
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

## 2024-04-12 NOTE — PROGRESS NOTE ADULT - NS ATTEND AMEND GEN_ALL_CORE FT
Diabetes control remains labile necessitating intermittent glucose infusion. glucose was 86- last night and today 008--133.I agree with checking AM cortisol.  and continuing current insulin regimen.

## 2024-04-12 NOTE — PROGRESS NOTE ADULT - PROBLEM SELECTOR PLAN 6
DVT ppx: Eliquis  Diet: CC w/ evening snack D: consistent carb w/ evening snack  E: replete as necessary  DVT ppx: Eliquis  Dispo: MANDI

## 2024-04-12 NOTE — PROGRESS NOTE ADULT - PROBLEM SELECTOR PLAN 1
Most recent POCT A1c 8%, likely inaccurate given her labile sugars and frequently low sugars. C peptide 9.7 in May of 2023. Patient likely with gastroparesis fair leading to nausea and vomiting and poor PO and likely hypoglycemia from poor PO and endogenous insulin production    - FSG q6h  - D5W LR 125mL/hr  - QTc >500, hold off on Regaln and Zofran for nausea  - Tigan 200mg IM q8h PRN for nausea  - appreciate endo recs Most recent POCT A1c 8%, likely inaccurate given her labile sugars and frequently low sugars. C peptide 9.7 in May of 2023. Patient likely with gastroparesis fair leading to nausea and vomiting and poor PO and likely hypoglycemia from poor PO and endogenous insulin production. Home medications: Ozempic 1g qweek (last injection 4/2), Lantus 40u qhs, Lispro 15u TID with meals. Several episodes of BG <60 since admission, no BG levels >200    - hold off insulin, f/u endo recs  - FSG q6h  - D5W LR 125mL/hr  - QTc >500, hold off on Regaln and Zofran for nausea  - Tigan 200mg IM q8h PRN for nausea

## 2024-04-12 NOTE — PROGRESS NOTE ADULT - SUBJECTIVE AND OBJECTIVE BOX
SUBJECTIVE / INTERVAL HPI: Patient was seen and examined this morning. Continues to have abdominal pain, nausea. Reports watery output from ostomy, but she just changed bag, so not assessed. Thinks she is getting another  infection. Glucose continues to be labile. Attempted titrating off dextrose IV but glucose dropped again on D5@ 100ml. She required multiple amps. She lost IV access for about 1 hour last night. She had 3 yogurts when glucose dropped to 54 at MN. She had a banana around 11AM. She has pitcher of juice at bedside for hypoglycemia, but as not had much of it. She's now on D5@ 125ml/hr. She continue to endorse last home insulin dose several days ago and that she was taking lantus/lisro, NOT U500.    CAPILLARY BLOOD GLUCOSE & INSULIN RECEIVED  178 mg/dL (04-11 @ 03:59)  194 mg/dL (04-11 @ 05:27) - D5 @ 100ml/hr  146 mg/dL (04-11 @ 06:42)  168 mg/dL (04-11 @ 08:40)  208 mg/dL (04-11 @ 11:49)  193 mg/dL (04-11 @ 12:03) - D5 @ 50ml/hr  86 mg/dL (04-11 @ 18:09) - D5 increase to 100 ml/hr.  123 mg/dL (04-11 @ 22:04)  54 mg/dL (04-12 @ 00:34) - 1/2 amp, 3 yogurts  130 mg/dL (04-12 @ 01:11)  81 mg/dL (04-12 @ 03:00)  54 mg/dL (04-12 @ 03:32) - 1/2 amp. D5 increased to 125ml/hr  168 mg/dL (04-12 @ 06:43)  55 mg/dL (04-12 @ 08:25) - 1 amp  168 mg/dL (04-12 @ 09:01)  133 mg/dL (04-12 @ 12:21)      REVIEW OF SYSTEMS  Constitutional:  Negative fever, chills + loss of appetite.  Eyes:  Negative blurry vision or double vision.  Cardiovascular:  Negative for chest pain or palpitations.  Respiratory:  Negative for cough, wheezing, or shortness of breath.   Gastrointestinal:  Negative for vomiting, diarrhea, constipation, + abdominal pain + nausea  Genitourinary:  Negative frequency, urgency or dysuria.  Neurologic:  No headache, confusion, dizziness, lightheadedness.    PHYSICAL EXAM  Vital Signs Last 24 Hrs  T(C): 36.4 (12 Apr 2024 11:36), Max: 36.9 (11 Apr 2024 17:58)  T(F): 97.5 (12 Apr 2024 11:36), Max: 98.5 (11 Apr 2024 17:58)  HR: 97 (12 Apr 2024 11:36) (97 - 108)  BP: 162/93 (12 Apr 2024 11:36) (140/81 - 162/93)  BP(mean): 116 (12 Apr 2024 11:36) (116 - 116)  RR: 18 (12 Apr 2024 11:36) (18 - 19)  SpO2: 98% (12 Apr 2024 11:36) (95% - 99%)    Parameters below as of 12 Apr 2024 11:36  Patient On (Oxygen Delivery Method): room air      Constitutional: Awake, alert, in no acute distress. Obese  HEENT: Normocephalic, atraumatic, NAOMI.  Respiratory: Lungs clear to ausculation bilaterally.   Cardiovascular: regular rhythm, normal S1 and S2, no audible murmurs.   GI: soft, non-tender, non-distended, bowel sounds present. + ostomy  Extremities: No lower extremity edema.  Psychiatric: AAO x 3. Normal affect/mood    LABS  CBC - WBC/HGB/HTC/PLT: 14.16/9.7/34.3/515 (04-11-24)  BMP - Na/K/Cl/Bicarb/BUN/Cr/Gluc/AG/eGFR: 138/3.6/103/25/7/0.58/208/10/114 (04-11-24)  Ca - 8.6 (04-11-24)  Phos - 3.0 (04-11-24)  Mg - 1.8 (04-11-24)  LFT - Alb/Tprot/Tbili/Dbili/AlkPhos/ALT/AST: 3.8/--/0.2/--/153/5/15 (04-10-24)        MEDICATIONS  MEDICATIONS  (STANDING):  apixaban 5 milliGRAM(s) Oral every 12 hours  atorvastatin 80 milliGRAM(s) Oral at bedtime  clopidogrel Tablet 75 milliGRAM(s) Oral daily  dextrose 10% Bolus 125 milliLiter(s) IV Bolus once  dextrose 5% + lactated ringers. 1000 milliLiter(s) (125 mL/Hr) IV Continuous <Continuous>  dextrose 5%. 1000 milliLiter(s) (50 mL/Hr) IV Continuous <Continuous>  dextrose 5%. 1000 milliLiter(s) (100 mL/Hr) IV Continuous <Continuous>  dextrose 50% Injectable 12.5 Gram(s) IV Push once  dextrose 50% Injectable 25 Gram(s) IV Push once  gabapentin 600 milliGRAM(s) Oral three times a day  glucagon  Injectable 1 milliGRAM(s) IntraMuscular once  influenza   Vaccine 0.5 milliLiter(s) IntraMuscular once  nortriptyline 10 milliGRAM(s) Oral at bedtime    MEDICATIONS  (PRN):  dextrose Oral Gel 15 Gram(s) Oral once PRN Blood Glucose LESS THAN 70 milliGRAM(s)/deciliter  trimethobenzamide Injectable 200 milliGRAM(s) IntraMuscular every 8 hours PRN Nausea and/or Vomiting

## 2024-04-12 NOTE — PROGRESS NOTE ADULT - ATTENDING COMMENTS
Pt is 45 yo woman with obesity, abdominal hernia pending repair, colostomy, CAD with multiple stents, recent admission for hypoglycemia, recent change in insulin regimen, as per dexcom improved glycemic control over the past 3 month, admitted for 'not feeling well'. Pt was found to be hypoglycemic on this admission and started on D5 drip. As per pt, she used her Ozempic more than 7 days ago. Current insulin regimen includes lantus and lispro.   ----hypoglycemia likely medication related. Will discuss with Endocrinology team downtitrating insulin regimen on discharge. Given pt's insulin resistance and obesity, Ozempic may be an appropriate medication for her but needs to be used with caution with concurrent insulin regimen.  ----CAD with mulitple stents, cont with DAP therapy. Will need statin added on discharge  ----cont with colostomy care Pt is 43 yo woman with obesity, abdominal hernia pending repair, colostomy, CAD with multiple stents, recent admission for hypoglycemia, recent change in insulin regimen, as per dexcom improved glycemic control over the past 3 month, admitted for 'not feeling well'. Pt was found to be hypoglycemic on this admission and started on D5 drip. As per pt, she used her Ozempic more than 7 days ago. Current insulin regimen includes lantus and lispro.   ----hypoglycemia likely medication related. Will discuss with Endocrinology team downtitrating insulin regimen on discharge. Given pt's insulin resistance and obesity, Ozempic may be an appropriate medication for her but needs to be used with caution with concurrent insulin regimen. For now cont with D5 drip till FS>100 for a consecutive 12 hours  ----CAD with mulitple stents, cont with DAP therapy. Will need statin added on discharge  ----cont with colostomy care

## 2024-04-12 NOTE — PROGRESS NOTE ADULT - SUBJECTIVE AND OBJECTIVE BOX
**INCOMPLETE NOTE    OVERNIGHT EVENTS:    SUBJECTIVE:  Patient seen and examined at bedside.    Vital Signs Last 12 Hrs  T(F): 98.2 (04-12-24 @ 05:15), Max: 98.2 (04-12-24 @ 05:15)  HR: 101 (04-12-24 @ 05:15) (101 - 101)  BP: 147/73 (04-12-24 @ 05:15) (147/73 - 147/73)  BP(mean): --  RR: 19 (04-12-24 @ 05:15) (19 - 19)  SpO2: 95% (04-12-24 @ 05:15) (95% - 95%)  I&O's Summary      PHYSICAL EXAM:  Constitutional: NAD, comfortable in bed.  HEENT: NC/AT, PERRLA, EOMI, no conjunctival pallor or scleral icterus, MMM  Neck: Supple, no JVD  Respiratory: CTA B/L. No w/r/r.   Cardiovascular: RRR, normal S1 and S2, no m/r/g.   Gastrointestinal: +BS, soft NTND, no guarding or rebound tenderness, no palpable masses   Extremities: wwp; no cyanosis, clubbing or edema.   Vascular: Pulses equal and strong throughout.   Neurological: AAOx3, no CN deficits, strength and sensation intact throughout.   Skin: No gross skin abnormalities or rashes        LABS:                        9.7    14.16 )-----------( 515      ( 11 Apr 2024 11:49 )             34.3     04-11    138  |  103  |  7   ----------------------------<  208<H>  3.6   |  25  |  0.58    Ca    8.6      11 Apr 2024 11:49  Phos  3.0     04-11  Mg     1.8     04-11    TPro  7.7  /  Alb  3.8  /  TBili  0.2  /  DBili  x   /  AST  15  /  ALT  5<L>  /  AlkPhos  153<H>  04-10      Urinalysis Basic - ( 11 Apr 2024 11:49 )    Color: x / Appearance: x / SG: x / pH: x  Gluc: 208 mg/dL / Ketone: x  / Bili: x / Urobili: x   Blood: x / Protein: x / Nitrite: x   Leuk Esterase: x / RBC: x / WBC x   Sq Epi: x / Non Sq Epi: x / Bacteria: x          RADIOLOGY & ADDITIONAL TESTS:    MEDICATIONS  (STANDING):  apixaban 5 milliGRAM(s) Oral every 12 hours  atorvastatin 80 milliGRAM(s) Oral at bedtime  clopidogrel Tablet 75 milliGRAM(s) Oral daily  dextrose 10% Bolus 125 milliLiter(s) IV Bolus once  dextrose 5% + lactated ringers. 1000 milliLiter(s) (125 mL/Hr) IV Continuous <Continuous>  dextrose 5%. 1000 milliLiter(s) (50 mL/Hr) IV Continuous <Continuous>  dextrose 5%. 1000 milliLiter(s) (100 mL/Hr) IV Continuous <Continuous>  dextrose 50% Injectable 12.5 Gram(s) IV Push once  dextrose 50% Injectable 25 Gram(s) IV Push once  gabapentin 600 milliGRAM(s) Oral three times a day  glucagon  Injectable 1 milliGRAM(s) IntraMuscular once  influenza   Vaccine 0.5 milliLiter(s) IntraMuscular once  nortriptyline 10 milliGRAM(s) Oral at bedtime    MEDICATIONS  (PRN):  dextrose Oral Gel 15 Gram(s) Oral once PRN Blood Glucose LESS THAN 70 milliGRAM(s)/deciliter  trimethobenzamide Injectable 200 milliGRAM(s) IntraMuscular every 8 hours PRN Nausea and/or Vomiting   OVERNIGHT EVENTS: fsg 123, 01:00 paged by nurse pt with dexcom in 50s, asx, fsg 50s, given juice with sugar packet and 1/2 amp. per patient, havent had dinner, kept at 100cc/hr, iv blown after 1/2 amp, replaced 03:00. fsg at 80s 03:30 fsg 50s while on drip, given 1/2 amp, increased dextrose drip to 125 cc/hr    SUBJECTIVE:  Patient seen and examined at bedside. Patient endorsing mild abdominal cramping and nausea, denies vomiting and diarrhea.     Vital Signs Last 12 Hrs  T(F): 98.2 (04-12-24 @ 05:15), Max: 98.2 (04-12-24 @ 05:15)  HR: 101 (04-12-24 @ 05:15) (101 - 101)  BP: 147/73 (04-12-24 @ 05:15) (147/73 - 147/73)  BP(mean): --  RR: 19 (04-12-24 @ 05:15) (19 - 19)  SpO2: 95% (04-12-24 @ 05:15) (95% - 95%)  I&O's Summary      PHYSICAL EXAM:  Constitutional: WDWN resting comfortably in bed; NAD  HEENT: NC/AT, EOMI, no oropharyngeal erythema or exudates, MMM; no thyromegaly or anterior/posterior or submental AMPARO; neck supple  Respiratory: CTA B/L; no W/R/R, no retractions  Cardiac: +S1/S2; RRR; no M/R/G appreciated  Gastrointestinal: abdomen soft, NT/ND, +BS, no rebound tenderness or guarding  Back: no CVAT B/L  Extremities: legs WWP, no clubbing or cyanosis; no peripheral edema  Vascular: 2+ distal pedal pulses B/L  Dermatologic: skin warm, dry and intact; no rashes, wounds, or scars  Neurologic: AAOx3; CNII-XII grossly intact; strength 5/5 and sensation intact in all 4 extremities; no focal deficits  Psychiatric: affect and characteristics of appearance, verbalizations, behaviors are appropriate        LABS:                        9.7    14.16 )-----------( 515      ( 11 Apr 2024 11:49 )             34.3     04-11    138  |  103  |  7   ----------------------------<  208<H>  3.6   |  25  |  0.58    Ca    8.6      11 Apr 2024 11:49  Phos  3.0     04-11  Mg     1.8     04-11    TPro  7.7  /  Alb  3.8  /  TBili  0.2  /  DBili  x   /  AST  15  /  ALT  5<L>  /  AlkPhos  153<H>  04-10      Urinalysis Basic - ( 11 Apr 2024 11:49 )    Color: x / Appearance: x / SG: x / pH: x  Gluc: 208 mg/dL / Ketone: x  / Bili: x / Urobili: x   Blood: x / Protein: x / Nitrite: x   Leuk Esterase: x / RBC: x / WBC x   Sq Epi: x / Non Sq Epi: x / Bacteria: x          RADIOLOGY & ADDITIONAL TESTS:    MEDICATIONS  (STANDING):  apixaban 5 milliGRAM(s) Oral every 12 hours  atorvastatin 80 milliGRAM(s) Oral at bedtime  clopidogrel Tablet 75 milliGRAM(s) Oral daily  dextrose 10% Bolus 125 milliLiter(s) IV Bolus once  dextrose 5% + lactated ringers. 1000 milliLiter(s) (125 mL/Hr) IV Continuous <Continuous>  dextrose 5%. 1000 milliLiter(s) (50 mL/Hr) IV Continuous <Continuous>  dextrose 5%. 1000 milliLiter(s) (100 mL/Hr) IV Continuous <Continuous>  dextrose 50% Injectable 12.5 Gram(s) IV Push once  dextrose 50% Injectable 25 Gram(s) IV Push once  gabapentin 600 milliGRAM(s) Oral three times a day  glucagon  Injectable 1 milliGRAM(s) IntraMuscular once  influenza   Vaccine 0.5 milliLiter(s) IntraMuscular once  nortriptyline 10 milliGRAM(s) Oral at bedtime    MEDICATIONS  (PRN):  dextrose Oral Gel 15 Gram(s) Oral once PRN Blood Glucose LESS THAN 70 milliGRAM(s)/deciliter  trimethobenzamide Injectable 200 milliGRAM(s) IntraMuscular every 8 hours PRN Nausea and/or Vomiting

## 2024-04-13 LAB
ALBUMIN SERPL ELPH-MCNC: 3.3 G/DL — SIGNIFICANT CHANGE UP (ref 3.3–5)
ALP SERPL-CCNC: 137 U/L — HIGH (ref 40–120)
ALT FLD-CCNC: 6 U/L — LOW (ref 10–45)
ANION GAP SERPL CALC-SCNC: 10 MMOL/L — SIGNIFICANT CHANGE UP (ref 5–17)
AST SERPL-CCNC: 13 U/L — SIGNIFICANT CHANGE UP (ref 10–40)
BASOPHILS # BLD AUTO: 0.01 K/UL — SIGNIFICANT CHANGE UP (ref 0–0.2)
BASOPHILS NFR BLD AUTO: 0.1 % — SIGNIFICANT CHANGE UP (ref 0–2)
BILIRUB SERPL-MCNC: <0.2 MG/DL — SIGNIFICANT CHANGE UP (ref 0.2–1.2)
BUN SERPL-MCNC: 9 MG/DL — SIGNIFICANT CHANGE UP (ref 7–23)
CALCIUM SERPL-MCNC: 8.8 MG/DL — SIGNIFICANT CHANGE UP (ref 8.4–10.5)
CHLORIDE SERPL-SCNC: 102 MMOL/L — SIGNIFICANT CHANGE UP (ref 96–108)
CO2 SERPL-SCNC: 25 MMOL/L — SIGNIFICANT CHANGE UP (ref 22–31)
CORTIS AM PEAK SERPL-MCNC: 4.01 UG/DL — LOW (ref 6.02–18.4)
CREAT SERPL-MCNC: 0.7 MG/DL — SIGNIFICANT CHANGE UP (ref 0.5–1.3)
EGFR: 109 ML/MIN/1.73M2 — SIGNIFICANT CHANGE UP
EOSINOPHIL # BLD AUTO: 0.18 K/UL — SIGNIFICANT CHANGE UP (ref 0–0.5)
EOSINOPHIL NFR BLD AUTO: 1.9 % — SIGNIFICANT CHANGE UP (ref 0–6)
GLUCOSE BLDC GLUCOMTR-MCNC: 102 MG/DL — HIGH (ref 70–99)
GLUCOSE BLDC GLUCOMTR-MCNC: 108 MG/DL — HIGH (ref 70–99)
GLUCOSE BLDC GLUCOMTR-MCNC: 120 MG/DL — HIGH (ref 70–99)
GLUCOSE BLDC GLUCOMTR-MCNC: 135 MG/DL — HIGH (ref 70–99)
GLUCOSE BLDC GLUCOMTR-MCNC: 65 MG/DL — LOW (ref 70–99)
GLUCOSE BLDC GLUCOMTR-MCNC: 79 MG/DL — SIGNIFICANT CHANGE UP (ref 70–99)
GLUCOSE SERPL-MCNC: 163 MG/DL — HIGH (ref 70–99)
HCT VFR BLD CALC: 35.5 % — SIGNIFICANT CHANGE UP (ref 34.5–45)
HGB BLD-MCNC: 10 G/DL — LOW (ref 11.5–15.5)
IMM GRANULOCYTES NFR BLD AUTO: 0.3 % — SIGNIFICANT CHANGE UP (ref 0–0.9)
LYMPHOCYTES # BLD AUTO: 2.33 K/UL — SIGNIFICANT CHANGE UP (ref 1–3.3)
LYMPHOCYTES # BLD AUTO: 24.2 % — SIGNIFICANT CHANGE UP (ref 13–44)
MAGNESIUM SERPL-MCNC: 1.9 MG/DL — SIGNIFICANT CHANGE UP (ref 1.6–2.6)
MCHC RBC-ENTMCNC: 20 PG — LOW (ref 27–34)
MCHC RBC-ENTMCNC: 28.2 GM/DL — LOW (ref 32–36)
MCV RBC AUTO: 71 FL — LOW (ref 80–100)
MONOCYTES # BLD AUTO: 0.69 K/UL — SIGNIFICANT CHANGE UP (ref 0–0.9)
MONOCYTES NFR BLD AUTO: 7.2 % — SIGNIFICANT CHANGE UP (ref 2–14)
NEUTROPHILS # BLD AUTO: 6.37 K/UL — SIGNIFICANT CHANGE UP (ref 1.8–7.4)
NEUTROPHILS NFR BLD AUTO: 66.3 % — SIGNIFICANT CHANGE UP (ref 43–77)
NRBC # BLD: 0 /100 WBCS — SIGNIFICANT CHANGE UP (ref 0–0)
PHOSPHATE SERPL-MCNC: 3.4 MG/DL — SIGNIFICANT CHANGE UP (ref 2.5–4.5)
PLATELET # BLD AUTO: 481 K/UL — HIGH (ref 150–400)
POTASSIUM SERPL-MCNC: 3.6 MMOL/L — SIGNIFICANT CHANGE UP (ref 3.5–5.3)
POTASSIUM SERPL-SCNC: 3.6 MMOL/L — SIGNIFICANT CHANGE UP (ref 3.5–5.3)
PROT SERPL-MCNC: 6.7 G/DL — SIGNIFICANT CHANGE UP (ref 6–8.3)
RBC # BLD: 5 M/UL — SIGNIFICANT CHANGE UP (ref 3.8–5.2)
RBC # FLD: 17.6 % — HIGH (ref 10.3–14.5)
SODIUM SERPL-SCNC: 137 MMOL/L — SIGNIFICANT CHANGE UP (ref 135–145)
WBC # BLD: 9.61 K/UL — SIGNIFICANT CHANGE UP (ref 3.8–10.5)
WBC # FLD AUTO: 9.61 K/UL — SIGNIFICANT CHANGE UP (ref 3.8–10.5)

## 2024-04-13 PROCEDURE — 76937 US GUIDE VASCULAR ACCESS: CPT | Mod: 26

## 2024-04-13 PROCEDURE — 36000 PLACE NEEDLE IN VEIN: CPT

## 2024-04-13 PROCEDURE — 99232 SBSQ HOSP IP/OBS MODERATE 35: CPT | Mod: GC

## 2024-04-13 RX ORDER — KETOROLAC TROMETHAMINE 30 MG/ML
15 SYRINGE (ML) INJECTION ONCE
Refills: 0 | Status: COMPLETED | OUTPATIENT
Start: 2024-04-13 | End: 2024-04-13

## 2024-04-13 RX ORDER — DEXTROSE 50 % IN WATER 50 %
25 SYRINGE (ML) INTRAVENOUS ONCE
Refills: 0 | Status: COMPLETED | OUTPATIENT
Start: 2024-04-13 | End: 2024-04-13

## 2024-04-13 RX ORDER — ACETAMINOPHEN 500 MG
1000 TABLET ORAL ONCE
Refills: 0 | Status: DISCONTINUED | OUTPATIENT
Start: 2024-04-13 | End: 2024-04-13

## 2024-04-13 RX ORDER — SODIUM CHLORIDE 9 MG/ML
1000 INJECTION, SOLUTION INTRAVENOUS
Refills: 0 | Status: DISCONTINUED | OUTPATIENT
Start: 2024-04-13 | End: 2024-04-13

## 2024-04-13 RX ORDER — SODIUM CHLORIDE 9 MG/ML
1000 INJECTION, SOLUTION INTRAVENOUS
Refills: 0 | Status: DISCONTINUED | OUTPATIENT
Start: 2024-04-13 | End: 2024-04-16

## 2024-04-13 RX ORDER — ACETAMINOPHEN 500 MG
1000 TABLET ORAL ONCE
Refills: 0 | Status: COMPLETED | OUTPATIENT
Start: 2024-04-13 | End: 2024-04-13

## 2024-04-13 RX ORDER — KETOROLAC TROMETHAMINE 30 MG/ML
15 SYRINGE (ML) INJECTION ONCE
Refills: 0 | Status: DISCONTINUED | OUTPATIENT
Start: 2024-04-13 | End: 2024-04-13

## 2024-04-13 RX ORDER — FAMOTIDINE 10 MG/ML
20 INJECTION INTRAVENOUS EVERY 24 HOURS
Refills: 0 | Status: DISCONTINUED | OUTPATIENT
Start: 2024-04-13 | End: 2024-04-15

## 2024-04-13 RX ORDER — IBUPROFEN 200 MG
600 TABLET ORAL ONCE
Refills: 0 | Status: COMPLETED | OUTPATIENT
Start: 2024-04-13 | End: 2024-04-13

## 2024-04-13 RX ORDER — LIDOCAINE 4 G/100G
1 CREAM TOPICAL ONCE
Refills: 0 | Status: DISCONTINUED | OUTPATIENT
Start: 2024-04-13 | End: 2024-04-18

## 2024-04-13 RX ADMIN — NORTRIPTYLINE HYDROCHLORIDE 10 MILLIGRAM(S): 10 CAPSULE ORAL at 22:41

## 2024-04-13 RX ADMIN — Medication 200 MILLIGRAM(S): at 12:53

## 2024-04-13 RX ADMIN — FAMOTIDINE 20 MILLIGRAM(S): 10 INJECTION INTRAVENOUS at 09:38

## 2024-04-13 RX ADMIN — Medication 15 MILLIGRAM(S): at 12:29

## 2024-04-13 RX ADMIN — CLOPIDOGREL BISULFATE 75 MILLIGRAM(S): 75 TABLET, FILM COATED ORAL at 12:29

## 2024-04-13 RX ADMIN — Medication 600 MILLIGRAM(S): at 04:32

## 2024-04-13 RX ADMIN — Medication 15 MILLIGRAM(S): at 13:29

## 2024-04-13 RX ADMIN — Medication 25 MILLILITER(S): at 18:32

## 2024-04-13 RX ADMIN — Medication 600 MILLIGRAM(S): at 03:35

## 2024-04-13 RX ADMIN — ATORVASTATIN CALCIUM 80 MILLIGRAM(S): 80 TABLET, FILM COATED ORAL at 22:41

## 2024-04-13 RX ADMIN — SODIUM CHLORIDE 125 MILLILITER(S): 9 INJECTION, SOLUTION INTRAVENOUS at 09:09

## 2024-04-13 RX ADMIN — APIXABAN 5 MILLIGRAM(S): 2.5 TABLET, FILM COATED ORAL at 18:26

## 2024-04-13 RX ADMIN — GABAPENTIN 600 MILLIGRAM(S): 400 CAPSULE ORAL at 14:02

## 2024-04-13 RX ADMIN — GABAPENTIN 600 MILLIGRAM(S): 400 CAPSULE ORAL at 22:41

## 2024-04-13 NOTE — PROGRESS NOTE ADULT - NS ATTEST RISK PROBLEM GEN_ALL_CORE FT
CVA,CHD, brittle diabetes
#Hypoglycemia   #Hxof IDDM Type II   #Hx of gastroparesis  #Hx of PE   #Loose stool  #Migraine  #Hx of CVA in 2021   #Hx of CAD

## 2024-04-13 NOTE — PROGRESS NOTE ADULT - PROBLEM SELECTOR PLAN 2
Most recent POCT A1c 8%, likely inaccurate given her labile sugars and frequently low sugars. C peptide 9.7 in May of 2023. Patient likely with gastroparesis fair leading to nausea and vomiting and poor PO and likely hypoglycemia from poor PO and endogenous insulin production. Home medication for gastroparesis: Gabapentin 600mg PO TID and Nortriptyline 10mg PO qhs. Home medications: Ozempic 1g qweek (last injection 4/2), Lantus 40u qhs, Lispro 15u TID with meals. Several episodes of BG <60 since admission, no BG levels >200    - hold off insulin, f/u endo recs  - f/u CELIA antibody, C peptide, A1c  - d/c Ozempic inpatient  - c/w home meds for gastroparesis

## 2024-04-13 NOTE — PROCEDURE NOTE - NSICDXPROCEDURE_GEN_ALL_CORE_FT
PROCEDURES:  Ultrasound guided venous access 12-Apr-2024 03:31:16  Maico Williamson  
PROCEDURES:  Ultrasound guided venous access 12-Apr-2024 03:31:16  Maico Williamson

## 2024-04-13 NOTE — PROGRESS NOTE ADULT - PROBLEM SELECTOR PLAN 1
Most recent POCT A1c 8%, likely inaccurate given her labile sugars and frequently low sugars. C peptide 9.7 in May of 2023. Patient likely with gastroparesis fair leading to nausea and vomiting and poor PO and likely hypoglycemia from poor PO and endogenous insulin production. Home medications: Ozempic 1g qweek (last injection 4/2), Lantus 40u qhs, Lispro 15u TID with meals. Several episodes of BG <60 since admission, no BG levels >200    - hold off insulin, f/u endo recs  - FSG q6h  - D5W LR 125mL/hr  - QTc >500, hold off on Regaln and Zofran for nausea  - Tigan 200mg IM q8h PRN for nausea Most recent POCT A1c 8%, likely inaccurate given her labile sugars and frequently low sugars. C peptide 9.7 in May of 2023. Patient likely with gastroparesis fair leading to nausea and vomiting and poor PO and likely hypoglycemia from poor PO and endogenous insulin production. Home medications: Ozempic 1g qweek (last injection 4/2), Lantus 40u qhs, Lispro 15u TID with meals. Several episodes of BG <60 since admission, no BG levels >200    - hold off insulin, f/u endo recs  - FSG q6h  - D5W LR 125mL/hr --> decreased to 75cc/hr on 4/13  - QTc >500, hold off on Regaln and Zofran for nausea  - Tigan 200mg IM q8h PRN for nausea

## 2024-04-13 NOTE — PROGRESS NOTE ADULT - SUBJECTIVE AND OBJECTIVE BOX
**INCOMPLETE NOTE    OVERNIGHT EVENTS:    SUBJECTIVE:  Patient seen and examined at bedside.    Vital Signs Last 12 Hrs  T(F): 98.4 (04-13-24 @ 05:37), Max: 98.4 (04-13-24 @ 05:37)  HR: 91 (04-13-24 @ 05:37) (91 - 91)  BP: 165/84 (04-13-24 @ 05:37) (165/84 - 165/84)  BP(mean): --  RR: 18 (04-13-24 @ 05:37) (18 - 18)  SpO2: 98% (04-13-24 @ 05:37) (98% - 98%)  I&O's Summary      PHYSICAL EXAM:  Constitutional: NAD, comfortable in bed.  HEENT: NC/AT, PERRLA, EOMI, no conjunctival pallor or scleral icterus, MMM  Neck: Supple, no JVD  Respiratory: CTA B/L. No w/r/r.   Cardiovascular: RRR, normal S1 and S2, no m/r/g.   Gastrointestinal: +BS, soft NTND, no guarding or rebound tenderness, no palpable masses   Extremities: wwp; no cyanosis, clubbing or edema.   Vascular: Pulses equal and strong throughout.   Neurological: AAOx3, no CN deficits, strength and sensation intact throughout.   Skin: No gross skin abnormalities or rashes        LABS:                        10.0   9.61  )-----------( 481      ( 13 Apr 2024 05:30 )             35.5     04-13    137  |  102  |  9   ----------------------------<  163<H>  3.6   |  25  |  0.70    Ca    8.8      13 Apr 2024 05:30  Phos  3.4     04-13  Mg     1.9     04-13    TPro  6.7  /  Alb  3.3  /  TBili  <0.2  /  DBili  x   /  AST  13  /  ALT  6<L>  /  AlkPhos  137<H>  04-13      Urinalysis Basic - ( 13 Apr 2024 05:30 )    Color: x / Appearance: x / SG: x / pH: x  Gluc: 163 mg/dL / Ketone: x  / Bili: x / Urobili: x   Blood: x / Protein: x / Nitrite: x   Leuk Esterase: x / RBC: x / WBC x   Sq Epi: x / Non Sq Epi: x / Bacteria: x          RADIOLOGY & ADDITIONAL TESTS:    MEDICATIONS  (STANDING):  acetaminophen     Tablet .. 650 milliGRAM(s) Oral every 6 hours  apixaban 5 milliGRAM(s) Oral every 12 hours  atorvastatin 80 milliGRAM(s) Oral at bedtime  clopidogrel Tablet 75 milliGRAM(s) Oral daily  dextrose 10% Bolus 125 milliLiter(s) IV Bolus once  dextrose 5% + lactated ringers. 1000 milliLiter(s) (125 mL/Hr) IV Continuous <Continuous>  dextrose 5%. 1000 milliLiter(s) (100 mL/Hr) IV Continuous <Continuous>  dextrose 5%. 1000 milliLiter(s) (50 mL/Hr) IV Continuous <Continuous>  dextrose 50% Injectable 12.5 Gram(s) IV Push once  dextrose 50% Injectable 25 Gram(s) IV Push once  famotidine    Tablet 20 milliGRAM(s) Oral every 24 hours  gabapentin 600 milliGRAM(s) Oral three times a day  glucagon  Injectable 1 milliGRAM(s) IntraMuscular once  influenza   Vaccine 0.5 milliLiter(s) IntraMuscular once  lidocaine   4% Patch 1 Patch Transdermal once  nortriptyline 10 milliGRAM(s) Oral at bedtime    MEDICATIONS  (PRN):  dextrose Oral Gel 15 Gram(s) Oral once PRN Blood Glucose LESS THAN 70 milliGRAM(s)/deciliter  trimethobenzamide Injectable 200 milliGRAM(s) IntraMuscular every 8 hours PRN Nausea and/or Vomiting   OVERNIGHT EVENTS: : pt complaing of pain all over- gave patietn options of toradol, ofirmev, heat packs, lidocaine, pt refusing all. Pt requesting opiates, informed patient contraindication with gastroparesis. Pt agitated. Pt rrecieved 600mg ibuprofen    SUBJECTIVE:  Patient seen and examined at bedside. Patient complaining of pain, belching, and loose stool in her colostomy bag, wanting opioids for pain. Explained once again that this is not possible given her gastroparesis. Denies SOB, chest pain, pressure, lightheadedness.     Vital Signs Last 12 Hrs  T(F): 98.4 (04-13-24 @ 05:37), Max: 98.4 (04-13-24 @ 05:37)  HR: 91 (04-13-24 @ 05:37) (91 - 91)  BP: 165/84 (04-13-24 @ 05:37) (165/84 - 165/84)  BP(mean): --  RR: 18 (04-13-24 @ 05:37) (18 - 18)  SpO2: 98% (04-13-24 @ 05:37) (98% - 98%)  I&O's Summary      PHYSICAL EXAM:  Constitutional: restless appearing, has her night eye mask on  HEENT: NC/AT, EOMI, no conjunctival pallor or scleral icterus, MMM  Neck: Supple  Respiratory: CTA B/L. No w/r/r.   Cardiovascular: RRR, normal S1 and S2, no m/r/g.   Gastrointestinal: +BS, soft NTND, no guarding or rebound tenderness, no palpable masses, colostomy bag with loose stool    Extremities: wwp; no cyanosis, clubbing or edema.   Vascular: Pulses equal and strong throughout.   Neurological: AAOx3, no CN deficits grossly  Skin: No gross skin abnormalities or rashes        LABS:                        10.0   9.61  )-----------( 481      ( 13 Apr 2024 05:30 )             35.5     04-13    137  |  102  |  9   ----------------------------<  163<H>  3.6   |  25  |  0.70    Ca    8.8      13 Apr 2024 05:30  Phos  3.4     04-13  Mg     1.9     04-13    TPro  6.7  /  Alb  3.3  /  TBili  <0.2  /  DBili  x   /  AST  13  /  ALT  6<L>  /  AlkPhos  137<H>  04-13      Urinalysis Basic - ( 13 Apr 2024 05:30 )    Color: x / Appearance: x / SG: x / pH: x  Gluc: 163 mg/dL / Ketone: x  / Bili: x / Urobili: x   Blood: x / Protein: x / Nitrite: x   Leuk Esterase: x / RBC: x / WBC x   Sq Epi: x / Non Sq Epi: x / Bacteria: x          RADIOLOGY & ADDITIONAL TESTS:    MEDICATIONS  (STANDING):  acetaminophen     Tablet .. 650 milliGRAM(s) Oral every 6 hours  apixaban 5 milliGRAM(s) Oral every 12 hours  atorvastatin 80 milliGRAM(s) Oral at bedtime  clopidogrel Tablet 75 milliGRAM(s) Oral daily  dextrose 10% Bolus 125 milliLiter(s) IV Bolus once  dextrose 5% + lactated ringers. 1000 milliLiter(s) (125 mL/Hr) IV Continuous <Continuous>  dextrose 5%. 1000 milliLiter(s) (100 mL/Hr) IV Continuous <Continuous>  dextrose 5%. 1000 milliLiter(s) (50 mL/Hr) IV Continuous <Continuous>  dextrose 50% Injectable 12.5 Gram(s) IV Push once  dextrose 50% Injectable 25 Gram(s) IV Push once  famotidine    Tablet 20 milliGRAM(s) Oral every 24 hours  gabapentin 600 milliGRAM(s) Oral three times a day  glucagon  Injectable 1 milliGRAM(s) IntraMuscular once  influenza   Vaccine 0.5 milliLiter(s) IntraMuscular once  lidocaine   4% Patch 1 Patch Transdermal once  nortriptyline 10 milliGRAM(s) Oral at bedtime    MEDICATIONS  (PRN):  dextrose Oral Gel 15 Gram(s) Oral once PRN Blood Glucose LESS THAN 70 milliGRAM(s)/deciliter  trimethobenzamide Injectable 200 milliGRAM(s) IntraMuscular every 8 hours PRN Nausea and/or Vomiting

## 2024-04-13 NOTE — PROGRESS NOTE ADULT - ATTENDING COMMENTS
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     #Hypoglycemia   #Hx of IDDM Type II   #Hx of gastroparesis   -Endocrine following   -Will continue  D5LR 125 cc/hr and continue to monitor FS and attempt to titrate to off if FS tolerate   -Pt's AM cortisol was 4.010  -Continue home nortriptyline and gabapentin     #Loose stool  -Pt with loose stool in colostomy bag; Pt with negative GI PCR on 4/12   -Will continue to monitor outpt     #Hx of PE   -Will continue Apixaban    #Migraine  -Continue pain medication prn     #Hx of CVA in 2021   #Hx of CAD  -Will continue clopidogrel and atorvastatin     #DISPO  -Will d/c once clinically improved

## 2024-04-14 LAB
ALBUMIN SERPL ELPH-MCNC: 3.3 G/DL — SIGNIFICANT CHANGE UP (ref 3.3–5)
ALP SERPL-CCNC: 139 U/L — HIGH (ref 40–120)
ALT FLD-CCNC: 5 U/L — LOW (ref 10–45)
ANION GAP SERPL CALC-SCNC: 11 MMOL/L — SIGNIFICANT CHANGE UP (ref 5–17)
ANISOCYTOSIS BLD QL: SIGNIFICANT CHANGE UP
AST SERPL-CCNC: 12 U/L — SIGNIFICANT CHANGE UP (ref 10–40)
BASOPHILS # BLD AUTO: 0.09 K/UL — SIGNIFICANT CHANGE UP (ref 0–0.2)
BASOPHILS NFR BLD AUTO: 0.9 % — SIGNIFICANT CHANGE UP (ref 0–2)
BILIRUB SERPL-MCNC: 0.2 MG/DL — SIGNIFICANT CHANGE UP (ref 0.2–1.2)
BUN SERPL-MCNC: 11 MG/DL — SIGNIFICANT CHANGE UP (ref 7–23)
CALCIUM SERPL-MCNC: 9.1 MG/DL — SIGNIFICANT CHANGE UP (ref 8.4–10.5)
CHLORIDE SERPL-SCNC: 102 MMOL/L — SIGNIFICANT CHANGE UP (ref 96–108)
CO2 SERPL-SCNC: 24 MMOL/L — SIGNIFICANT CHANGE UP (ref 22–31)
CREAT SERPL-MCNC: 0.73 MG/DL — SIGNIFICANT CHANGE UP (ref 0.5–1.3)
DACRYOCYTES BLD QL SMEAR: SLIGHT — SIGNIFICANT CHANGE UP
EGFR: 104 ML/MIN/1.73M2 — SIGNIFICANT CHANGE UP
EOSINOPHIL # BLD AUTO: 0.18 K/UL — SIGNIFICANT CHANGE UP (ref 0–0.5)
EOSINOPHIL NFR BLD AUTO: 1.7 % — SIGNIFICANT CHANGE UP (ref 0–6)
GIANT PLATELETS BLD QL SMEAR: PRESENT — SIGNIFICANT CHANGE UP
GLUCOSE BLDC GLUCOMTR-MCNC: 160 MG/DL — HIGH (ref 70–99)
GLUCOSE BLDC GLUCOMTR-MCNC: 187 MG/DL — HIGH (ref 70–99)
GLUCOSE BLDC GLUCOMTR-MCNC: 192 MG/DL — HIGH (ref 70–99)
GLUCOSE BLDC GLUCOMTR-MCNC: 57 MG/DL — LOW (ref 70–99)
GLUCOSE BLDC GLUCOMTR-MCNC: 82 MG/DL — SIGNIFICANT CHANGE UP (ref 70–99)
GLUCOSE BLDC GLUCOMTR-MCNC: 87 MG/DL — SIGNIFICANT CHANGE UP (ref 70–99)
GLUCOSE BLDC GLUCOMTR-MCNC: 96 MG/DL — SIGNIFICANT CHANGE UP (ref 70–99)
GLUCOSE SERPL-MCNC: 136 MG/DL — HIGH (ref 70–99)
HCT VFR BLD CALC: 34.3 % — LOW (ref 34.5–45)
HGB BLD-MCNC: 9.8 G/DL — LOW (ref 11.5–15.5)
HYPOCHROMIA BLD QL: SIGNIFICANT CHANGE UP
LYMPHOCYTES # BLD AUTO: 1.45 K/UL — SIGNIFICANT CHANGE UP (ref 1–3.3)
LYMPHOCYTES # BLD AUTO: 13.8 % — SIGNIFICANT CHANGE UP (ref 13–44)
MAGNESIUM SERPL-MCNC: 1.7 MG/DL — SIGNIFICANT CHANGE UP (ref 1.6–2.6)
MANUAL SMEAR VERIFICATION: SIGNIFICANT CHANGE UP
MCHC RBC-ENTMCNC: 20 PG — LOW (ref 27–34)
MCHC RBC-ENTMCNC: 28.6 GM/DL — LOW (ref 32–36)
MCV RBC AUTO: 70 FL — LOW (ref 80–100)
MICROCYTES BLD QL: SIGNIFICANT CHANGE UP
MONOCYTES # BLD AUTO: 0.55 K/UL — SIGNIFICANT CHANGE UP (ref 0–0.9)
MONOCYTES NFR BLD AUTO: 5.2 % — SIGNIFICANT CHANGE UP (ref 2–14)
NEUTROPHILS # BLD AUTO: 8.22 K/UL — HIGH (ref 1.8–7.4)
NEUTROPHILS NFR BLD AUTO: 78.4 % — HIGH (ref 43–77)
OVALOCYTES BLD QL SMEAR: SLIGHT — SIGNIFICANT CHANGE UP
PHOSPHATE SERPL-MCNC: 3.8 MG/DL — SIGNIFICANT CHANGE UP (ref 2.5–4.5)
PLAT MORPH BLD: ABNORMAL
PLATELET # BLD AUTO: 492 K/UL — HIGH (ref 150–400)
POIKILOCYTOSIS BLD QL AUTO: SLIGHT — SIGNIFICANT CHANGE UP
POLYCHROMASIA BLD QL SMEAR: SLIGHT — SIGNIFICANT CHANGE UP
POTASSIUM SERPL-MCNC: 4 MMOL/L — SIGNIFICANT CHANGE UP (ref 3.5–5.3)
POTASSIUM SERPL-SCNC: 4 MMOL/L — SIGNIFICANT CHANGE UP (ref 3.5–5.3)
PROT SERPL-MCNC: 6.6 G/DL — SIGNIFICANT CHANGE UP (ref 6–8.3)
RBC # BLD: 4.9 M/UL — SIGNIFICANT CHANGE UP (ref 3.8–5.2)
RBC # FLD: 17.7 % — HIGH (ref 10.3–14.5)
RBC BLD AUTO: ABNORMAL
SODIUM SERPL-SCNC: 137 MMOL/L — SIGNIFICANT CHANGE UP (ref 135–145)
WBC # BLD: 10.49 K/UL — SIGNIFICANT CHANGE UP (ref 3.8–10.5)
WBC # FLD AUTO: 10.49 K/UL — SIGNIFICANT CHANGE UP (ref 3.8–10.5)

## 2024-04-14 PROCEDURE — 99232 SBSQ HOSP IP/OBS MODERATE 35: CPT

## 2024-04-14 PROCEDURE — 99231 SBSQ HOSP IP/OBS SF/LOW 25: CPT

## 2024-04-14 RX ORDER — LOPERAMIDE HCL 2 MG
2 TABLET ORAL EVERY 4 HOURS
Refills: 0 | Status: DISCONTINUED | OUTPATIENT
Start: 2024-04-14 | End: 2024-04-18

## 2024-04-14 RX ORDER — ACETAMINOPHEN 500 MG
1000 TABLET ORAL ONCE
Refills: 0 | Status: DISCONTINUED | OUTPATIENT
Start: 2024-04-14 | End: 2024-04-14

## 2024-04-14 RX ORDER — DEXTROSE 50 % IN WATER 50 %
50 SYRINGE (ML) INTRAVENOUS ONCE
Refills: 0 | Status: COMPLETED | OUTPATIENT
Start: 2024-04-14 | End: 2024-04-14

## 2024-04-14 RX ORDER — KETOROLAC TROMETHAMINE 30 MG/ML
15 SYRINGE (ML) INJECTION EVERY 6 HOURS
Refills: 0 | Status: DISCONTINUED | OUTPATIENT
Start: 2024-04-14 | End: 2024-04-15

## 2024-04-14 RX ORDER — OXYCODONE HYDROCHLORIDE 5 MG/1
5 TABLET ORAL ONCE
Refills: 0 | Status: DISCONTINUED | OUTPATIENT
Start: 2024-04-14 | End: 2024-04-14

## 2024-04-14 RX ORDER — KETOROLAC TROMETHAMINE 30 MG/ML
15 SYRINGE (ML) INJECTION ONCE
Refills: 0 | Status: DISCONTINUED | OUTPATIENT
Start: 2024-04-14 | End: 2024-04-14

## 2024-04-14 RX ORDER — MAGNESIUM SULFATE 500 MG/ML
2 VIAL (ML) INJECTION ONCE
Refills: 0 | Status: COMPLETED | OUTPATIENT
Start: 2024-04-14 | End: 2024-04-14

## 2024-04-14 RX ADMIN — Medication 200 MILLIGRAM(S): at 10:56

## 2024-04-14 RX ADMIN — Medication 15 MILLIGRAM(S): at 21:35

## 2024-04-14 RX ADMIN — Medication 15 MILLIGRAM(S): at 10:23

## 2024-04-14 RX ADMIN — NORTRIPTYLINE HYDROCHLORIDE 10 MILLIGRAM(S): 10 CAPSULE ORAL at 21:40

## 2024-04-14 RX ADMIN — APIXABAN 5 MILLIGRAM(S): 2.5 TABLET, FILM COATED ORAL at 06:34

## 2024-04-14 RX ADMIN — CLOPIDOGREL BISULFATE 75 MILLIGRAM(S): 75 TABLET, FILM COATED ORAL at 12:36

## 2024-04-14 RX ADMIN — SODIUM CHLORIDE 100 MILLILITER(S): 9 INJECTION, SOLUTION INTRAVENOUS at 04:59

## 2024-04-14 RX ADMIN — Medication 1 APPLICATORFUL: at 22:08

## 2024-04-14 RX ADMIN — FAMOTIDINE 20 MILLIGRAM(S): 10 INJECTION INTRAVENOUS at 10:23

## 2024-04-14 RX ADMIN — Medication 2 MILLIGRAM(S): at 10:23

## 2024-04-14 RX ADMIN — Medication 15 MILLIGRAM(S): at 00:09

## 2024-04-14 RX ADMIN — OXYCODONE HYDROCHLORIDE 5 MILLIGRAM(S): 5 TABLET ORAL at 16:14

## 2024-04-14 RX ADMIN — GABAPENTIN 600 MILLIGRAM(S): 400 CAPSULE ORAL at 06:34

## 2024-04-14 RX ADMIN — GABAPENTIN 600 MILLIGRAM(S): 400 CAPSULE ORAL at 13:53

## 2024-04-14 RX ADMIN — APIXABAN 5 MILLIGRAM(S): 2.5 TABLET, FILM COATED ORAL at 18:55

## 2024-04-14 RX ADMIN — OXYCODONE HYDROCHLORIDE 5 MILLIGRAM(S): 5 TABLET ORAL at 15:14

## 2024-04-14 RX ADMIN — Medication 25 GRAM(S): at 09:46

## 2024-04-14 RX ADMIN — Medication 15 MILLIGRAM(S): at 00:30

## 2024-04-14 RX ADMIN — GABAPENTIN 600 MILLIGRAM(S): 400 CAPSULE ORAL at 21:41

## 2024-04-14 RX ADMIN — Medication 2 MILLIGRAM(S): at 14:27

## 2024-04-14 RX ADMIN — Medication 650 MILLIGRAM(S): at 22:41

## 2024-04-14 RX ADMIN — Medication 2 MILLIGRAM(S): at 21:42

## 2024-04-14 RX ADMIN — Medication 650 MILLIGRAM(S): at 21:41

## 2024-04-14 RX ADMIN — ATORVASTATIN CALCIUM 80 MILLIGRAM(S): 80 TABLET, FILM COATED ORAL at 21:42

## 2024-04-14 RX ADMIN — Medication 15 MILLIGRAM(S): at 10:38

## 2024-04-14 RX ADMIN — Medication 15 MILLIGRAM(S): at 20:35

## 2024-04-14 NOTE — PROGRESS NOTE ADULT - SUBJECTIVE AND OBJECTIVE BOX
INTERVAL HPI/OVERNIGHT EVENTS:  Pt lying in bed, half asleep but answering my questions.  Appetite is still not good.  She reports increased output from ostomy.  She has hypoglycemia symptoms when sugars are in low 70s.  D5 drip trying to be decreased.  yesterday night, I spoke with team to try to reduce and tolerate glucose to 70.  4/13  135,  102,  79, 120, 65 (at 11p)  4/14  160,  187,  192  D5 currently at 50 cc/hour  from chart:   C peptide 8.2 with glucose 189    MEDICATIONS  (STANDING):  acetaminophen     Tablet .. 650 milliGRAM(s) Oral every 6 hours  apixaban 5 milliGRAM(s) Oral every 12 hours  atorvastatin 80 milliGRAM(s) Oral at bedtime  clopidogrel Tablet 75 milliGRAM(s) Oral daily  dextrose 10% Bolus 125 milliLiter(s) IV Bolus once  dextrose 5% + lactated ringers. 1000 milliLiter(s) (50 mL/Hr) IV Continuous <Continuous>  dextrose 5%. 1000 milliLiter(s) (50 mL/Hr) IV Continuous <Continuous>  dextrose 5%. 1000 milliLiter(s) (100 mL/Hr) IV Continuous <Continuous>  dextrose 50% Injectable 12.5 Gram(s) IV Push once  dextrose 50% Injectable 25 Gram(s) IV Push once  famotidine    Tablet 20 milliGRAM(s) Oral every 24 hours  gabapentin 600 milliGRAM(s) Oral three times a day  glucagon  Injectable 1 milliGRAM(s) IntraMuscular once  influenza   Vaccine 0.5 milliLiter(s) IntraMuscular once  lidocaine   4% Patch 1 Patch Transdermal once  nortriptyline 10 milliGRAM(s) Oral at bedtime    PHYSICAL EXAM  Vital Signs Last 24 Hrs  T(C): 36.5 (14 Apr 2024 06:17), Max: 36.5 (13 Apr 2024 20:20)  T(F): 97.7 (14 Apr 2024 06:17), Max: 97.7 (13 Apr 2024 20:20)  HR: 100 (14 Apr 2024 06:17) (100 - 104)  BP: 167/104 (14 Apr 2024 06:17) (161/101 - 167/104)  BP(mean): 121 (13 Apr 2024 20:20) (121 - 121)  RR: 18 (14 Apr 2024 06:17) (18 - 18)  SpO2: 98% (14 Apr 2024 06:17) (98% - 99%)    Parameters below as of 14 Apr 2024 06:17  Patient On (Oxygen Delivery Method): room air    Constitutional: wn/wd in NAD.   Psychiatric: AAO x 3, normal affect/mood.    LABS:                        9.8    10.49 )-----------( 492      ( 14 Apr 2024 05:30 )             34.3     04-14    137  |  102  |  11  ----------------------------<  136<H>  4.0   |  24  |  0.73    Ca    9.1      14 Apr 2024 05:30  Phos  3.8     04-14  Mg     1.7     04-14    TPro  6.6  /  Alb  3.3  /  TBili  0.2  /  DBili  x   /  AST  12  /  ALT  5<L>  /  AlkPhos  139<H>  04-14    Thyroid Stimulating Hormone, Serum: 0.797 uIU/mL (12-31 @ 12:46)  Thyroid Stimulating Hormone, Serum: 0.305 uIU/mL (06-21 @ 07:24)  Thyroid Stimulating Hormone, Serum: 0.851 uIU/mL (04-28 @ 05:24)    CAPILLARY BLOOD GLUCOSE  POCT Blood Glucose.: 192 mg/dL (14 Apr 2024 12:31)  POCT Blood Glucose.: 187 mg/dL (14 Apr 2024 09:50)  POCT Blood Glucose.: 160 mg/dL (14 Apr 2024 07:00)  POCT Blood Glucose.: 96 mg/dL (14 Apr 2024 00:55)  POCT Blood Glucose.: 65 mg/dL (13 Apr 2024 23:35)  POCT Blood Glucose.: 120 mg/dL (13 Apr 2024 18:57)  POCT Blood Glucose.: 79 mg/dL (13 Apr 2024 17:53)  POCT Blood Glucose.: 102 mg/dL (13 Apr 2024 13:01)

## 2024-04-14 NOTE — PROGRESS NOTE ADULT - ASSESSMENT
Diabetes, prone to hypoglycemia.  Continue decreasing D5 infusion and monitoring glucose.  I think she is hyperinsulinemic due to insulin resistance and giving her D5 is tricky because she will produce hyperinsulinemic response.

## 2024-04-14 NOTE — PROGRESS NOTE ADULT - ASSESSMENT
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     #Hypoglycemia   #Hx of IDDM Type II   #Hx of gastroparesis   -Endocrine following   -Will continue D5LR    -Will  continue to monitor FS and attempt to titrate D5LR  to off if FS tolerate   -Continue home nortriptyline and gabapentin     #Loose stool (improved)  -Pt reports that her stools have become more solid; Pt with negative GI PCR on 4/12   -Will continue to monitor outpt     #Hx of Pulmonary emboli  -Will continue Apixaban    #Migraine  -Continue pain medication prn     #Hx of CVA in 2021   #Hx of CAD  -Will continue clopidogrel and atorvastatin     #DISPO  -Will d/c once clinically improved.

## 2024-04-14 NOTE — PROGRESS NOTE ADULT - SUBJECTIVE AND OBJECTIVE BOX
Patient was seen and examined at bedside. Case discuss with resident. Pt continues to have some abdominal pain. Pt also with nausea however she was able to tolerate some food yesterday.     OBJECTIVE:  Vital Signs Last 24 Hrs  T(C): 36.5 (14 Apr 2024 06:17), Max: 36.5 (13 Apr 2024 20:20)  T(F): 97.7 (14 Apr 2024 06:17), Max: 97.7 (13 Apr 2024 20:20)  HR: 100 (14 Apr 2024 06:17) (100 - 104)  BP: 167/104 (14 Apr 2024 06:17) (161/101 - 167/104)  BP(mean): 121 (13 Apr 2024 20:20) (121 - 121)  RR: 18 (14 Apr 2024 06:17) (18 - 18)  SpO2: 98% (14 Apr 2024 06:17) (98% - 99%)    Parameters below as of 14 Apr 2024 06:17  Patient On (Oxygen Delivery Method): room air    PE: NAD laying in bed   CTA B/l no wheezing or crackles  Nl S1,S2 no mumur   + tenderness no rebound or guarding +colostomy in place   Neuro: AAOx3; strength 5/5 b/l UE and LE; sensation intact     LABS:                        9.8    10.49 )-----------( 492      ( 14 Apr 2024 05:30 )             34.3     04-14    137  |  102  |  11  ----------------------------<  136<H>  4.0   |  24  |  0.73    Ca    9.1      14 Apr 2024 05:30  Phos  3.8     04-14  Mg     1.7     04-14    TPro  6.6  /  Alb  3.3  /  TBili  0.2  /  DBili  x   /  AST  12  /  ALT  5<L>  /  AlkPhos  139<H>  04-14    LIVER FUNCTIONS - ( 14 Apr 2024 05:30 )  Alb: 3.3 g/dL / Pro: 6.6 g/dL / ALK PHOS: 139 U/L / ALT: 5 U/L / AST: 12 U/L / GGT: x             CAPILLARY BLOOD GLUCOSE  POCT Blood Glucose.: 187 mg/dL (14 Apr 2024 09:50)  POCT Blood Glucose.: 160 mg/dL (14 Apr 2024 07:00)  POCT Blood Glucose.: 96 mg/dL (14 Apr 2024 00:55)  POCT Blood Glucose.: 65 mg/dL (13 Apr 2024 23:35)  POCT Blood Glucose.: 120 mg/dL (13 Apr 2024 18:57)  POCT Blood Glucose.: 79 mg/dL (13 Apr 2024 17:53)  POCT Blood Glucose.: 102 mg/dL (13 Apr 2024 13:01)        acetaminophen     Tablet .. 650 milliGRAM(s) Oral every 6 hours  apixaban 5 milliGRAM(s) Oral every 12 hours  atorvastatin 80 milliGRAM(s) Oral at bedtime  clopidogrel Tablet 75 milliGRAM(s) Oral daily  dextrose 10% Bolus 125 milliLiter(s) IV Bolus once  dextrose 5% + lactated ringers. 1000 milliLiter(s) IV Continuous <Continuous>  dextrose 5%. 1000 milliLiter(s) IV Continuous <Continuous>  dextrose 5%. 1000 milliLiter(s) IV Continuous <Continuous>  dextrose 50% Injectable 12.5 Gram(s) IV Push once  dextrose 50% Injectable 25 Gram(s) IV Push once  dextrose Oral Gel 15 Gram(s) Oral once PRN  famotidine    Tablet 20 milliGRAM(s) Oral every 24 hours  gabapentin 600 milliGRAM(s) Oral three times a day  glucagon  Injectable 1 milliGRAM(s) IntraMuscular once  influenza   Vaccine 0.5 milliLiter(s) IntraMuscular once  ketorolac   Injectable 15 milliGRAM(s) IV Push every 6 hours PRN  lidocaine   4% Patch 1 Patch Transdermal once  loperamide 2 milliGRAM(s) Oral every 4 hours PRN  nortriptyline 10 milliGRAM(s) Oral at bedtime  trimethobenzamide Injectable 200 milliGRAM(s) IntraMuscular every 8 hours PRN

## 2024-04-15 LAB
ADD ON TEST-SPECIMEN IN LAB: SIGNIFICANT CHANGE UP
ALBUMIN SERPL ELPH-MCNC: 3.3 G/DL — SIGNIFICANT CHANGE UP (ref 3.3–5)
ALP SERPL-CCNC: 143 U/L — HIGH (ref 40–120)
ALT FLD-CCNC: 8 U/L — LOW (ref 10–45)
ANION GAP SERPL CALC-SCNC: 10 MMOL/L — SIGNIFICANT CHANGE UP (ref 5–17)
ANISOCYTOSIS BLD QL: SIGNIFICANT CHANGE UP
AST SERPL-CCNC: 17 U/L — SIGNIFICANT CHANGE UP (ref 10–40)
BASOPHILS # BLD AUTO: 0 K/UL — SIGNIFICANT CHANGE UP (ref 0–0.2)
BASOPHILS NFR BLD AUTO: 0 % — SIGNIFICANT CHANGE UP (ref 0–2)
BILIRUB SERPL-MCNC: 0.2 MG/DL — SIGNIFICANT CHANGE UP (ref 0.2–1.2)
BUN SERPL-MCNC: 12 MG/DL — SIGNIFICANT CHANGE UP (ref 7–23)
CALCIUM SERPL-MCNC: 8.9 MG/DL — SIGNIFICANT CHANGE UP (ref 8.4–10.5)
CHLORIDE SERPL-SCNC: 100 MMOL/L — SIGNIFICANT CHANGE UP (ref 96–108)
CO2 SERPL-SCNC: 24 MMOL/L — SIGNIFICANT CHANGE UP (ref 22–31)
CREAT SERPL-MCNC: 0.85 MG/DL — SIGNIFICANT CHANGE UP (ref 0.5–1.3)
EGFR: 87 ML/MIN/1.73M2 — SIGNIFICANT CHANGE UP
EOSINOPHIL # BLD AUTO: 0.24 K/UL — SIGNIFICANT CHANGE UP (ref 0–0.5)
EOSINOPHIL NFR BLD AUTO: 1.7 % — SIGNIFICANT CHANGE UP (ref 0–6)
GLUCOSE BLDC GLUCOMTR-MCNC: 113 MG/DL — HIGH (ref 70–99)
GLUCOSE BLDC GLUCOMTR-MCNC: 131 MG/DL — HIGH (ref 70–99)
GLUCOSE BLDC GLUCOMTR-MCNC: 150 MG/DL — HIGH (ref 70–99)
GLUCOSE BLDC GLUCOMTR-MCNC: 160 MG/DL — HIGH (ref 70–99)
GLUCOSE SERPL-MCNC: 121 MG/DL — HIGH (ref 70–99)
HCT VFR BLD CALC: 38.3 % — SIGNIFICANT CHANGE UP (ref 34.5–45)
HGB BLD-MCNC: 10.6 G/DL — LOW (ref 11.5–15.5)
HYPOCHROMIA BLD QL: SIGNIFICANT CHANGE UP
LYMPHOCYTES # BLD AUTO: 2.79 K/UL — SIGNIFICANT CHANGE UP (ref 1–3.3)
LYMPHOCYTES # BLD AUTO: 20 % — SIGNIFICANT CHANGE UP (ref 13–44)
MAGNESIUM SERPL-MCNC: 2 MG/DL — SIGNIFICANT CHANGE UP (ref 1.6–2.6)
MANUAL SMEAR VERIFICATION: SIGNIFICANT CHANGE UP
MCHC RBC-ENTMCNC: 20.1 PG — LOW (ref 27–34)
MCHC RBC-ENTMCNC: 27.7 GM/DL — LOW (ref 32–36)
MCV RBC AUTO: 72.5 FL — LOW (ref 80–100)
MICROCYTES BLD QL: SIGNIFICANT CHANGE UP
MONOCYTES # BLD AUTO: 0.61 K/UL — SIGNIFICANT CHANGE UP (ref 0–0.9)
MONOCYTES NFR BLD AUTO: 4.4 % — SIGNIFICANT CHANGE UP (ref 2–14)
NEUTROPHILS # BLD AUTO: 10.3 K/UL — HIGH (ref 1.8–7.4)
NEUTROPHILS NFR BLD AUTO: 73.9 % — SIGNIFICANT CHANGE UP (ref 43–77)
OVALOCYTES BLD QL SMEAR: SLIGHT — SIGNIFICANT CHANGE UP
PHOSPHATE SERPL-MCNC: 3.6 MG/DL — SIGNIFICANT CHANGE UP (ref 2.5–4.5)
PLAT MORPH BLD: NORMAL — SIGNIFICANT CHANGE UP
PLATELET # BLD AUTO: 531 K/UL — HIGH (ref 150–400)
POIKILOCYTOSIS BLD QL AUTO: SLIGHT — SIGNIFICANT CHANGE UP
POLYCHROMASIA BLD QL SMEAR: SLIGHT — SIGNIFICANT CHANGE UP
POTASSIUM SERPL-MCNC: 5 MMOL/L — SIGNIFICANT CHANGE UP (ref 3.5–5.3)
POTASSIUM SERPL-SCNC: 5 MMOL/L — SIGNIFICANT CHANGE UP (ref 3.5–5.3)
PROT SERPL-MCNC: 6.9 G/DL — SIGNIFICANT CHANGE UP (ref 6–8.3)
RBC # BLD: 5.28 M/UL — HIGH (ref 3.8–5.2)
RBC # FLD: 18.3 % — HIGH (ref 10.3–14.5)
RBC BLD AUTO: ABNORMAL
SODIUM SERPL-SCNC: 134 MMOL/L — LOW (ref 135–145)
T4 FREE SERPL-MCNC: 1.17 NG/DL — SIGNIFICANT CHANGE UP (ref 0.93–1.7)
TSH SERPL-MCNC: 2.7 UIU/ML — SIGNIFICANT CHANGE UP (ref 0.27–4.2)
WBC # BLD: 13.94 K/UL — HIGH (ref 3.8–10.5)
WBC # FLD AUTO: 13.94 K/UL — HIGH (ref 3.8–10.5)

## 2024-04-15 PROCEDURE — 99233 SBSQ HOSP IP/OBS HIGH 50: CPT | Mod: GC

## 2024-04-15 PROCEDURE — 99232 SBSQ HOSP IP/OBS MODERATE 35: CPT

## 2024-04-15 RX ORDER — OXYCODONE HYDROCHLORIDE 5 MG/1
5 TABLET ORAL EVERY 4 HOURS
Refills: 0 | Status: DISCONTINUED | OUTPATIENT
Start: 2024-04-15 | End: 2024-04-16

## 2024-04-15 RX ORDER — OXYCODONE HYDROCHLORIDE 5 MG/1
2.5 TABLET ORAL ONCE
Refills: 0 | Status: DISCONTINUED | OUTPATIENT
Start: 2024-04-15 | End: 2024-04-15

## 2024-04-15 RX ORDER — LANOLIN ALCOHOL/MO/W.PET/CERES
5 CREAM (GRAM) TOPICAL AT BEDTIME
Refills: 0 | Status: DISCONTINUED | OUTPATIENT
Start: 2024-04-15 | End: 2024-04-18

## 2024-04-15 RX ORDER — ONDANSETRON 8 MG/1
4 TABLET, FILM COATED ORAL ONCE
Refills: 0 | Status: COMPLETED | OUTPATIENT
Start: 2024-04-15 | End: 2024-04-15

## 2024-04-15 RX ORDER — ACETAMINOPHEN 500 MG
1000 TABLET ORAL ONCE
Refills: 0 | Status: DISCONTINUED | OUTPATIENT
Start: 2024-04-15 | End: 2024-04-15

## 2024-04-15 RX ORDER — FAMOTIDINE 10 MG/ML
20 INJECTION INTRAVENOUS EVERY 12 HOURS
Refills: 0 | Status: DISCONTINUED | OUTPATIENT
Start: 2024-04-15 | End: 2024-04-18

## 2024-04-15 RX ADMIN — APIXABAN 5 MILLIGRAM(S): 2.5 TABLET, FILM COATED ORAL at 17:50

## 2024-04-15 RX ADMIN — CLOPIDOGREL BISULFATE 75 MILLIGRAM(S): 75 TABLET, FILM COATED ORAL at 11:27

## 2024-04-15 RX ADMIN — OXYCODONE HYDROCHLORIDE 2.5 MILLIGRAM(S): 5 TABLET ORAL at 01:56

## 2024-04-15 RX ADMIN — Medication 15 MILLIGRAM(S): at 13:21

## 2024-04-15 RX ADMIN — Medication 650 MILLIGRAM(S): at 09:41

## 2024-04-15 RX ADMIN — Medication 15 MILLIGRAM(S): at 06:15

## 2024-04-15 RX ADMIN — Medication 5 MILLIGRAM(S): at 22:18

## 2024-04-15 RX ADMIN — ATORVASTATIN CALCIUM 80 MILLIGRAM(S): 80 TABLET, FILM COATED ORAL at 21:37

## 2024-04-15 RX ADMIN — OXYCODONE HYDROCHLORIDE 5 MILLIGRAM(S): 5 TABLET ORAL at 23:18

## 2024-04-15 RX ADMIN — FAMOTIDINE 20 MILLIGRAM(S): 10 INJECTION INTRAVENOUS at 17:50

## 2024-04-15 RX ADMIN — GABAPENTIN 600 MILLIGRAM(S): 400 CAPSULE ORAL at 15:18

## 2024-04-15 RX ADMIN — OXYCODONE HYDROCHLORIDE 5 MILLIGRAM(S): 5 TABLET ORAL at 22:18

## 2024-04-15 RX ADMIN — Medication 1 APPLICATORFUL: at 21:39

## 2024-04-15 RX ADMIN — OXYCODONE HYDROCHLORIDE 2.5 MILLIGRAM(S): 5 TABLET ORAL at 02:56

## 2024-04-15 RX ADMIN — ONDANSETRON 4 MILLIGRAM(S): 8 TABLET, FILM COATED ORAL at 07:27

## 2024-04-15 RX ADMIN — APIXABAN 5 MILLIGRAM(S): 2.5 TABLET, FILM COATED ORAL at 07:45

## 2024-04-15 RX ADMIN — Medication 15 MILLIGRAM(S): at 13:45

## 2024-04-15 RX ADMIN — FAMOTIDINE 20 MILLIGRAM(S): 10 INJECTION INTRAVENOUS at 09:42

## 2024-04-15 RX ADMIN — GABAPENTIN 600 MILLIGRAM(S): 400 CAPSULE ORAL at 21:36

## 2024-04-15 RX ADMIN — Medication 650 MILLIGRAM(S): at 10:41

## 2024-04-15 RX ADMIN — GABAPENTIN 600 MILLIGRAM(S): 400 CAPSULE ORAL at 07:45

## 2024-04-15 RX ADMIN — Medication 15 MILLIGRAM(S): at 05:35

## 2024-04-15 RX ADMIN — NORTRIPTYLINE HYDROCHLORIDE 10 MILLIGRAM(S): 10 CAPSULE ORAL at 21:37

## 2024-04-15 NOTE — PROGRESS NOTE ADULT - ASSESSMENT
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  In ED, patient was tachycardic but VS otherwise stable. Her BG was initially 46 and she had a mild WBC and elevated lactate. She improved on a D5 drip and was trialed off, however BG started downtrending again and was restarted. Suspect current episode triggered by restarting ozempic at increased dose.   Of note, patient is aggressively trying to achieve A1C <8% in order to have hernia repaired.    A1C: 8.8% (March 2024),  10.8 % (Feb 2024), 16% (Jan 1, 2024)  C-peptide 9.7 and Insulin abs negative (May 2023)  BUN: 12  Creatinine: 0.85  GFR: 87  Weight: 111.6  BMI: 38. 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  In ED, patient was tachycardic but VS otherwise stable. Her BG was initially 46 and she had a mild WBC and elevated lactate. She improved on a D5 drip and was trialed off, however BG started downtrending again and was restarted. Suspect current episode triggered by restarting ozempic at increased dose.   Of note, patient is aggressively trying to achieve A1C <8% in order to have hernia repaired.    Last low BG of 57 yesterday afternoon on D5 @ 50ml. Likely hyperinsulinemic. Avoid overtreating lows with dextrose IVP or large quantities of juice whenever possible to avoid stimulating excess endogenous insulin production.   Cortisol level on the low side 2 days after receiving solumedrol, likely still some suppression. No other s/s of adrenal insufficiency. TFTs normal.    A1C: 8.8% (March 2024),  10.8 % (Feb 2024), 16% (Jan 1, 2024)  C-peptide 9.7, Insulin abs negative, free insulin 49 (high, 0-17) with  (May 2023)  BUN: 12  Creatinine: 0.85  GFR: 87  Weight: 111.6  BMI: 38.

## 2024-04-15 NOTE — PROGRESS NOTE ADULT - SUBJECTIVE AND OBJECTIVE BOX
SUBJECTIVE / INTERVAL HPI: Patient was seen and examined this morning. Pt very sleepy on exam because "she was up sick all night." Reports continued abd pain and nausea. Not eating. Unable to discontinue dextrose IVF due to low FSG, currently on D5 @ 50ml/hr.   AM cortisol 4.0 on 4/13. Last steroids on 4/10 evening. She had 2 appropriate dexamethasone suppression tests in the past.  TSH and free T4 are normal.    CAPILLARY BLOOD GLUCOSE & INSULIN RECEIVED  136 mg/dL (04-14 @ 05:30)  160 mg/dL (04-14 @ 07:00)  187 mg/dL (04-14 @ 09:50)  192 mg/dL (04-14 @ 12:31)  57 mg/dL (04-14 @ 17:56)  87 mg/dL (04-14 @ 18:50)  82 mg/dL (04-14 @ 21:52)  121 mg/dL (04-15 @ 05:30)  131 mg/dL (04-15 @ 09:08)  160 mg/dL (04-15 @ 12:58)    REVIEW OF SYSTEMS  Constitutional:  Negative fever, chills or loss of appetite.  Eyes:  Negative blurry vision or double vision.  Cardiovascular:  Negative for chest pain or palpitations.  Respiratory:  Negative for cough, wheezing, or shortness of breath.    Gastrointestinal:  Negative for nausea, vomiting, diarrhea, constipation, or abdominal pain.  Genitourinary:  Negative frequency, urgency or dysuria.  Neurologic:  No headache, confusion, dizziness, lightheadedness.    PHYSICAL EXAM  Vital Signs Last 24 Hrs  T(C): 36.6 (15 Apr 2024 12:00), Max: 36.9 (15 Apr 2024 05:54)  T(F): 97.9 (15 Apr 2024 12:00), Max: 98.5 (15 Apr 2024 05:54)  HR: 91 (15 Apr 2024 12:00) (91 - 108)  BP: 117/69 (15 Apr 2024 12:00) (113/60 - 145/86)  BP(mean): --  RR: 18 (15 Apr 2024 12:00) (18 - 18)  SpO2: 95% (15 Apr 2024 12:00) (95% - 97%)    Parameters below as of 15 Apr 2024 12:00  Patient On (Oxygen Delivery Method): room air    Constitutional: Awake, alert, in no acute distress.   HEENT: Normocephalic, atraumatic, NAOMI.  Respiratory: Lungs clear to ausculation bilaterally.   Cardiovascular: regular rhythm, normal S1 and S2, no audible murmurs.   GI: soft, non-tender, non-distended, bowel sounds present.  Extremities: No lower extremity edema.  Psychiatric: AAO x 3. Normal affect/mood.     LABS  CBC - WBC/HGB/HTC/PLT: 13.94/10.6/38.3/531 (04-15-24)  BMP - Na/K/Cl/Bicarb/BUN/Cr/Gluc/AG/eGFR: 134/5.0/100/24/12/0.85/121/10/87 (04-15-24)  Ca - 8.9 (04-15-24)  Phos - 3.6 (04-15-24)  Mg - 2.0 (04-15-24)  LFT - Alb/Tprot/Tbili/Dbili/AlkPhos/ALT/AST: 3.3/--/0.2/--/143/8/17 (04-15-24)    Thyroid Stimulating Hormone, Serum: 2.700 (04-15-24)  Total T4/Free T4: --/1.170 (04-15-24)    MEDICATIONS  MEDICATIONS  (STANDING):  acetaminophen     Tablet .. 650 milliGRAM(s) Oral every 6 hours  apixaban 5 milliGRAM(s) Oral every 12 hours  atorvastatin 80 milliGRAM(s) Oral at bedtime  clopidogrel Tablet 75 milliGRAM(s) Oral daily  clotrimazole 1% Vaginal Cream 1 Applicatorful Vaginal at bedtime  dextrose 10% Bolus 125 milliLiter(s) IV Bolus once  dextrose 5% + lactated ringers. 1000 milliLiter(s) (50 mL/Hr) IV Continuous <Continuous>  dextrose 5%. 1000 milliLiter(s) (50 mL/Hr) IV Continuous <Continuous>  dextrose 5%. 1000 milliLiter(s) (100 mL/Hr) IV Continuous <Continuous>  dextrose 50% Injectable 12.5 Gram(s) IV Push once  dextrose 50% Injectable 25 Gram(s) IV Push once  famotidine    Tablet 20 milliGRAM(s) Oral every 24 hours  gabapentin 600 milliGRAM(s) Oral three times a day  glucagon  Injectable 1 milliGRAM(s) IntraMuscular once  influenza   Vaccine 0.5 milliLiter(s) IntraMuscular once  lidocaine   4% Patch 1 Patch Transdermal once  nortriptyline 10 milliGRAM(s) Oral at bedtime    MEDICATIONS  (PRN):  dextrose Oral Gel 15 Gram(s) Oral once PRN Blood Glucose LESS THAN 70 milliGRAM(s)/deciliter  ketorolac   Injectable 15 milliGRAM(s) IV Push every 6 hours PRN Moderate Pain (4 - 6)  loperamide 2 milliGRAM(s) Oral every 4 hours PRN increased ostomy output  trimethobenzamide Injectable 200 milliGRAM(s) IntraMuscular every 8 hours PRN Nausea and/or Vomiting   SUBJECTIVE / INTERVAL HPI: Patient was seen and examined this morning. Pt very sleepy on exam because "she was up sick all night." Reports continued abd pain and nausea. She reports eating something yesterday, but was sick last nigtha nd hasn't eaten today. Unable to discontinue dextrose IVF due to low FSG, currently on D5 @ 50ml/hr.   AM cortisol 4.0 on 4/13. Last steroids on 4/10 evening. She had 2 appropriate dexamethasone suppression tests in the past.  TSH and free T4 are normal.    CAPILLARY BLOOD GLUCOSE & INSULIN RECEIVED  136 mg/dL (04-14 @ 05:30)  160 mg/dL (04-14 @ 07:00)  187 mg/dL (04-14 @ 09:50)  192 mg/dL (04-14 @ 12:31)  57 mg/dL (04-14 @ 17:56)  87 mg/dL (04-14 @ 18:50)  82 mg/dL (04-14 @ 21:52)  121 mg/dL (04-15 @ 05:30)  131 mg/dL (04-15 @ 09:08)  160 mg/dL (04-15 @ 12:58)    REVIEW OF SYSTEMS  Constitutional:  Negative fever, chills or loss of appetite.  Eyes:  Negative blurry vision or double vision.  Cardiovascular:  Negative for chest pain or palpitations.  Respiratory:  Negative for cough, wheezing, or shortness of breath.    Gastrointestinal:  Negative for nausea, vomiting, diarrhea, constipation, or abdominal pain.  Genitourinary:  Negative frequency, urgency or dysuria.  Neurologic:  No headache, confusion, dizziness, lightheadedness.    PHYSICAL EXAM  Vital Signs Last 24 Hrs  T(C): 36.6 (15 Apr 2024 12:00), Max: 36.9 (15 Apr 2024 05:54)  T(F): 97.9 (15 Apr 2024 12:00), Max: 98.5 (15 Apr 2024 05:54)  HR: 91 (15 Apr 2024 12:00) (91 - 108)  BP: 117/69 (15 Apr 2024 12:00) (113/60 - 145/86)  BP(mean): --  RR: 18 (15 Apr 2024 12:00) (18 - 18)  SpO2: 95% (15 Apr 2024 12:00) (95% - 97%)    Parameters below as of 15 Apr 2024 12:00  Patient On (Oxygen Delivery Method): room air    Constitutional: Awake, alert, in no acute distress.   HEENT: Normocephalic, atraumatic, NAOMI.  Respiratory: Lungs clear to ausculation bilaterally.   Cardiovascular: regular rhythm, normal S1 and S2, no audible murmurs.   GI: soft, non-tender, non-distended, bowel sounds present.  Extremities: No lower extremity edema.  Psychiatric: AAO x 3. Normal affect/mood.     LABS  CBC - WBC/HGB/HTC/PLT: 13.94/10.6/38.3/531 (04-15-24)  BMP - Na/K/Cl/Bicarb/BUN/Cr/Gluc/AG/eGFR: 134/5.0/100/24/12/0.85/121/10/87 (04-15-24)  Ca - 8.9 (04-15-24)  Phos - 3.6 (04-15-24)  Mg - 2.0 (04-15-24)  LFT - Alb/Tprot/Tbili/Dbili/AlkPhos/ALT/AST: 3.3/--/0.2/--/143/8/17 (04-15-24)    Thyroid Stimulating Hormone, Serum: 2.700 (04-15-24)  Total T4/Free T4: --/1.170 (04-15-24)    MEDICATIONS  MEDICATIONS  (STANDING):  acetaminophen     Tablet .. 650 milliGRAM(s) Oral every 6 hours  apixaban 5 milliGRAM(s) Oral every 12 hours  atorvastatin 80 milliGRAM(s) Oral at bedtime  clopidogrel Tablet 75 milliGRAM(s) Oral daily  clotrimazole 1% Vaginal Cream 1 Applicatorful Vaginal at bedtime  dextrose 10% Bolus 125 milliLiter(s) IV Bolus once  dextrose 5% + lactated ringers. 1000 milliLiter(s) (50 mL/Hr) IV Continuous <Continuous>  dextrose 5%. 1000 milliLiter(s) (50 mL/Hr) IV Continuous <Continuous>  dextrose 5%. 1000 milliLiter(s) (100 mL/Hr) IV Continuous <Continuous>  dextrose 50% Injectable 12.5 Gram(s) IV Push once  dextrose 50% Injectable 25 Gram(s) IV Push once  famotidine    Tablet 20 milliGRAM(s) Oral every 24 hours  gabapentin 600 milliGRAM(s) Oral three times a day  glucagon  Injectable 1 milliGRAM(s) IntraMuscular once  influenza   Vaccine 0.5 milliLiter(s) IntraMuscular once  lidocaine   4% Patch 1 Patch Transdermal once  nortriptyline 10 milliGRAM(s) Oral at bedtime    MEDICATIONS  (PRN):  dextrose Oral Gel 15 Gram(s) Oral once PRN Blood Glucose LESS THAN 70 milliGRAM(s)/deciliter  ketorolac   Injectable 15 milliGRAM(s) IV Push every 6 hours PRN Moderate Pain (4 - 6)  loperamide 2 milliGRAM(s) Oral every 4 hours PRN increased ostomy output  trimethobenzamide Injectable 200 milliGRAM(s) IntraMuscular every 8 hours PRN Nausea and/or Vomiting

## 2024-04-15 NOTE — PROGRESS NOTE ADULT - SUBJECTIVE AND OBJECTIVE BOX
Detail Level: Detailed Discussed with [facility] resident and on Call attending      SUBJECTIVE: Patient seen at bedside, states she is uncomfortable. Otherwise, ostomy output is reduced, blood in ostomy is reduced, irritation of skin is subjectively reduced. Patient denied abdominal exam.     Denies Chest Pain, Difficulty breathing, Calf Pain, Headache, Dizziness    OBJECTIVE:  Vital Signs Last 24 Hrs  T(C): 36.7 (17 Feb 2024 12:27), Max: 37.2 (16 Feb 2024 20:37)  T(F): 98 (17 Feb 2024 12:27), Max: 99 (16 Feb 2024 20:37)  HR: 91 (17 Feb 2024 12:27) (91 - 100)  BP: 114/71 (17 Feb 2024 12:27) (114/71 - 129/83)  BP(mean): --  RR: 18 (17 Feb 2024 12:27) (17 - 18)  SpO2: 97% (17 Feb 2024 12:27) (96% - 97%)    Parameters below as of 17 Feb 2024 12:27  Patient On (Oxygen Delivery Method): room air        I&O's Summary    16 Feb 2024 07:01  -  17 Feb 2024 07:00  --------------------------------------------------------  IN: 0 mL / OUT: 1150 mL / NET: -1150 mL        Physical Exam:  General Appearance: Appears well, NAD  Pulmonary: Nonlabored breathing, no respiratory distress  Cardiovascular: NSR  Abdomen: Exam denied  Extremities: WWP, SCD's in place     LABS:                        10.6   11.16 )-----------( 435      ( 17 Feb 2024 05:30 )             37.2     02-17    133<L>  |  101  |  15  ----------------------------<  307<H>  4.0   |  21<L>  |  0.75    Ca    8.5      17 Feb 2024 05:30  Phos  2.5     02-17  Mg     1.5     02-17    TPro  7.3  /  Alb  3.3  /  TBili  0.2  /  DBili  x   /  AST  9<L>  /  ALT  8<L>  /  AlkPhos  112  02-16    PT/INR - ( 16 Feb 2024 08:22 )   PT: 12.6 sec;   INR: 1.11          PTT - ( 16 Feb 2024 08:22 )  PTT:34.2 sec  Urinalysis Basic - ( 17 Feb 2024 05:30 )    Color: x / Appearance: x / SG: x / pH: x  Gluc: 307 mg/dL / Ketone: x  / Bili: x / Urobili: x   Blood: x / Protein: x / Nitrite: x   Leuk Esterase: x / RBC: x / WBC x   Sq Epi: x / Non Sq Epi: x / Bacteria: x       Send Procedure Quote As Charge: No

## 2024-04-15 NOTE — PROGRESS NOTE ADULT - ATTENDING COMMENTS
Pt is 43 yo woman with obesity, abdominal hernia pending repair, colostomy, CAD with multiple stents, recent admission for hypoglycemia, recent change in insulin regimen, as per dexcom improved glycemic control over the past 3 month, admitted for 'not feeling well'. Pt was found to be hypoglycemic on this admission and started on D5 drip.  ----cont with D5 drip, off insulin, cont to have hypoglycemic episodes. Noted to have low levels of C-peptide. Cortisol level is low. Planned for cosytropin stim test under Endo team guidance.   ----for chronic back, leg, abdominal pain will avoid opiates as pt complain of nausea and poor PO intake that can make hypoglycemia worse. Will cont with neurontin and tylenol for now  ----colostomy care as per protocol. Pt may require stool bulking agent

## 2024-04-15 NOTE — PROGRESS NOTE ADULT - PROBLEM SELECTOR PLAN 2
Most recent POCT A1c 8%, likely inaccurate given her labile sugars and frequently low sugars. C peptide 9.7 in May of 2023. Patient likely with gastroparesis fair leading to nausea and vomiting and poor PO and likely hypoglycemia from poor PO and endogenous insulin production. Home medication for gastroparesis: Gabapentin 600mg PO TID and Nortriptyline 10mg PO qhs. Home medications: Ozempic 1g qweek (last injection 4/2), Lantus 40u qhs, Lispro 15u TID with meals. Several episodes of BG <60 since admission, no BG levels >200    - hold off insulin, f/u endo recs  - f/u CELIA antibody, C peptide, A1c  - d/c Ozempic inpatient  - c/w home meds for gastroparesis Most recent POCT A1c 8%, likely inaccurate given her labile sugars and frequently low sugars. C peptide 9.7 in May of 2023. Patient likely with gastroparesis fair leading to nausea and vomiting and poor PO and likely hypoglycemia from poor PO and endogenous insulin production. Home medication for gastroparesis: Gabapentin 600mg PO TID and Nortriptyline 10mg PO qhs. Home medications: Ozempic 1g qweek (last injection 4/2), Lantus 40u qhs, Lispro 15u TID with meals. Several episodes of BG <60 since admission, no BG levels >200    - hold off insulin, f/u endo recs  - f/u CELIA antibody, C peptide, A1c     - A1C 8.2 (down from 10.8 in 02/2024); c-peptide 3.1 (down from 9.2 02/2024)  - d/c Ozempic inpatient  - c/w home meds for gastroparesis

## 2024-04-15 NOTE — PROGRESS NOTE ADULT - PROBLEM SELECTOR PLAN 5
nec fasc of abdomen in 2021 s/p corbin procedure w diverting ostomy nec fasc of abdomen in 2021 s/p corbin procedure w diverting ostomy    - patient w/ loose ostomy output since admission; GI PCR negative

## 2024-04-15 NOTE — PROGRESS NOTE ADULT - SUBJECTIVE AND OBJECTIVE BOX
**INCOMPLETE NOTE    OVERNIGHT EVENTS:    SUBJECTIVE:  Patient seen and examined at bedside.    Vital Signs Last 12 Hrs  T(F): 98.5 (04-15-24 @ 05:54), Max: 98.5 (04-15-24 @ 05:54)  HR: 100 (04-15-24 @ 05:54) (100 - 100)  BP: 145/86 (04-15-24 @ 05:54) (145/86 - 145/86)  BP(mean): --  RR: 18 (04-15-24 @ 05:54) (18 - 18)  SpO2: 95% (04-15-24 @ 05:54) (95% - 95%)  I&O's Summary      PHYSICAL EXAM:  Constitutional: NAD, comfortable in bed.  HEENT: NC/AT, PERRLA, EOMI, no conjunctival pallor or scleral icterus, MMM  Neck: Supple, no JVD  Respiratory: CTA B/L. No w/r/r.   Cardiovascular: RRR, normal S1 and S2, no m/r/g.   Gastrointestinal: +BS, soft NTND, no guarding or rebound tenderness, no palpable masses   Extremities: wwp; no cyanosis, clubbing or edema.   Vascular: Pulses equal and strong throughout.   Neurological: AAOx3, no CN deficits, strength and sensation intact throughout.   Skin: No gross skin abnormalities or rashes        LABS:                        10.6   13.94 )-----------( 531      ( 15 Apr 2024 05:30 )             38.3     04-15    134<L>  |  100  |  12  ----------------------------<  121<H>  5.0   |  24  |  0.85    Ca    8.9      15 Apr 2024 05:30  Phos  3.6     04-15  Mg     2.0     04-15    TPro  6.9  /  Alb  3.3  /  TBili  0.2  /  DBili  x   /  AST  17  /  ALT  8<L>  /  AlkPhos  143<H>  04-15      Urinalysis Basic - ( 15 Apr 2024 05:30 )    Color: x / Appearance: x / SG: x / pH: x  Gluc: 121 mg/dL / Ketone: x  / Bili: x / Urobili: x   Blood: x / Protein: x / Nitrite: x   Leuk Esterase: x / RBC: x / WBC x   Sq Epi: x / Non Sq Epi: x / Bacteria: x          RADIOLOGY & ADDITIONAL TESTS:    MEDICATIONS  (STANDING):  acetaminophen     Tablet .. 650 milliGRAM(s) Oral every 6 hours  apixaban 5 milliGRAM(s) Oral every 12 hours  atorvastatin 80 milliGRAM(s) Oral at bedtime  clopidogrel Tablet 75 milliGRAM(s) Oral daily  clotrimazole 1% Vaginal Cream 1 Applicatorful Vaginal at bedtime  dextrose 10% Bolus 125 milliLiter(s) IV Bolus once  dextrose 5% + lactated ringers. 1000 milliLiter(s) (50 mL/Hr) IV Continuous <Continuous>  dextrose 5%. 1000 milliLiter(s) (50 mL/Hr) IV Continuous <Continuous>  dextrose 5%. 1000 milliLiter(s) (100 mL/Hr) IV Continuous <Continuous>  dextrose 50% Injectable 12.5 Gram(s) IV Push once  dextrose 50% Injectable 25 Gram(s) IV Push once  famotidine    Tablet 20 milliGRAM(s) Oral every 24 hours  gabapentin 600 milliGRAM(s) Oral three times a day  glucagon  Injectable 1 milliGRAM(s) IntraMuscular once  influenza   Vaccine 0.5 milliLiter(s) IntraMuscular once  lidocaine   4% Patch 1 Patch Transdermal once  nortriptyline 10 milliGRAM(s) Oral at bedtime    MEDICATIONS  (PRN):  dextrose Oral Gel 15 Gram(s) Oral once PRN Blood Glucose LESS THAN 70 milliGRAM(s)/deciliter  ketorolac   Injectable 15 milliGRAM(s) IV Push every 6 hours PRN Moderate Pain (4 - 6)  loperamide 2 milliGRAM(s) Oral every 4 hours PRN increased ostomy output  trimethobenzamide Injectable 200 milliGRAM(s) IntraMuscular every 8 hours PRN Nausea and/or Vomiting   OVERNIGHT EVENTS: complaining of persistent pain, ordered ofirmev but patient refused it and requesting additional oxycodone "which she was promised she'd receive instead of ofirmev." confirmed patient having bowel movements. ordered oxy 2.5mg x1     SUBJECTIVE:  Patient seen and examined at bedside. Patient has multiple complaints including persistent abdominal cramping, loose ostomy output, nausea. Denies SOB, chest pain/pressure, palpitations, lightheadedness.     Vital Signs Last 12 Hrs  T(F): 98.5 (04-15-24 @ 05:54), Max: 98.5 (04-15-24 @ 05:54)  HR: 100 (04-15-24 @ 05:54) (100 - 100)  BP: 145/86 (04-15-24 @ 05:54) (145/86 - 145/86)  BP(mean): --  RR: 18 (04-15-24 @ 05:54) (18 - 18)  SpO2: 95% (04-15-24 @ 05:54) (95% - 95%)  I&O's Summary      PHYSICAL EXAM:  Constitutional: restless appearing, has her night eye mask on  HEENT: NC/AT, EOMI, no conjunctival pallor or scleral icterus, MMM  Neck: Supple  Respiratory: CTA B/L. No w/r/r.   Cardiovascular: RRR, normal S1 and S2, no m/r/g.   Gastrointestinal: +BS, soft NTND, no guarding or rebound tenderness, no palpable masses, colostomy bag with loose stool    Extremities: wwp; no cyanosis, clubbing or edema.   Vascular: Pulses equal and strong throughout.   Neurological: AAOx3, no CN deficits grossly  Skin: No gross skin abnormalities or rashes        LABS:                        10.6   13.94 )-----------( 531      ( 15 Apr 2024 05:30 )             38.3     04-15    134<L>  |  100  |  12  ----------------------------<  121<H>  5.0   |  24  |  0.85    Ca    8.9      15 Apr 2024 05:30  Phos  3.6     04-15  Mg     2.0     04-15    TPro  6.9  /  Alb  3.3  /  TBili  0.2  /  DBili  x   /  AST  17  /  ALT  8<L>  /  AlkPhos  143<H>  04-15      Urinalysis Basic - ( 15 Apr 2024 05:30 )    Color: x / Appearance: x / SG: x / pH: x  Gluc: 121 mg/dL / Ketone: x  / Bili: x / Urobili: x   Blood: x / Protein: x / Nitrite: x   Leuk Esterase: x / RBC: x / WBC x   Sq Epi: x / Non Sq Epi: x / Bacteria: x          RADIOLOGY & ADDITIONAL TESTS:    MEDICATIONS  (STANDING):  acetaminophen     Tablet .. 650 milliGRAM(s) Oral every 6 hours  apixaban 5 milliGRAM(s) Oral every 12 hours  atorvastatin 80 milliGRAM(s) Oral at bedtime  clopidogrel Tablet 75 milliGRAM(s) Oral daily  clotrimazole 1% Vaginal Cream 1 Applicatorful Vaginal at bedtime  dextrose 10% Bolus 125 milliLiter(s) IV Bolus once  dextrose 5% + lactated ringers. 1000 milliLiter(s) (50 mL/Hr) IV Continuous <Continuous>  dextrose 5%. 1000 milliLiter(s) (50 mL/Hr) IV Continuous <Continuous>  dextrose 5%. 1000 milliLiter(s) (100 mL/Hr) IV Continuous <Continuous>  dextrose 50% Injectable 12.5 Gram(s) IV Push once  dextrose 50% Injectable 25 Gram(s) IV Push once  famotidine    Tablet 20 milliGRAM(s) Oral every 24 hours  gabapentin 600 milliGRAM(s) Oral three times a day  glucagon  Injectable 1 milliGRAM(s) IntraMuscular once  influenza   Vaccine 0.5 milliLiter(s) IntraMuscular once  lidocaine   4% Patch 1 Patch Transdermal once  nortriptyline 10 milliGRAM(s) Oral at bedtime    MEDICATIONS  (PRN):  dextrose Oral Gel 15 Gram(s) Oral once PRN Blood Glucose LESS THAN 70 milliGRAM(s)/deciliter  ketorolac   Injectable 15 milliGRAM(s) IV Push every 6 hours PRN Moderate Pain (4 - 6)  loperamide 2 milliGRAM(s) Oral every 4 hours PRN increased ostomy output  trimethobenzamide Injectable 200 milliGRAM(s) IntraMuscular every 8 hours PRN Nausea and/or Vomiting

## 2024-04-15 NOTE — PROGRESS NOTE ADULT - PROBLEM SELECTOR PLAN 1
Most recent POCT A1c 8%, likely inaccurate given her labile sugars and frequently low sugars. C peptide 9.7 in May of 2023. Patient likely with gastroparesis fair leading to nausea and vomiting and poor PO and likely hypoglycemia from poor PO and endogenous insulin production. Home medications: Ozempic 1g qweek (last injection 4/2), Lantus 40u qhs, Lispro 15u TID with meals. Several episodes of BG <60 since admission, no BG levels >200    - hold off insulin, f/u endo recs  - FSG q6h  - D5W LR 125mL/hr --> decreased to 75cc/hr on 4/13  - QTc >500, hold off on Regaln and Zofran for nausea  - Tigan 200mg IM q8h PRN for nausea Most recent POCT A1c 8%, likely inaccurate given her labile sugars and frequently low sugars. C peptide 9.7 in May of 2023. Patient likely with gastroparesis fair leading to nausea and vomiting and poor PO and likely hypoglycemia from poor PO and endogenous insulin production. Home medications: Ozempic 1g qweek (last injection 4/2), Lantus 40u qhs, Lispro 15u TID with meals. Several episodes of BG <60 since admission, no BG levels >200    - hold off insulin, f/u endo recs  - FSG q6h; pt continues to have hypglycemic episodes, w/ fingersticks in the 50s, most recently 4/14 6:00pm which improved w/ juice  - D5W LR 125mL/hr --> decreased to 50cc/hr   - QTc >500 on admission, 480 on 4/14, gave 1x zofran 4/15 AM for nausea  bc patient refusing tigan   - Tigan 200mg IM q8h PRN for nausea  - Cortisol level low at 4 from 4/13; f/u repeat AM cortisol and ACTH on 4/16

## 2024-04-15 NOTE — PROGRESS NOTE ADULT - PROBLEM SELECTOR PLAN 6
Detail Level: Generalized Include Location In Plan?: No D: consistent carb w/ evening snack  E: replete as necessary  DVT ppx: Eliquis  Dispo: MANDI When Should The Patient Follow-Up For Their Next Full-Body Skin Exam?: 1 Year Quality 137: Melanoma: Continuity Of Care - Recall System: Patient information entered into a recall system that includes: target date for the next exam specified AND a process to follow up with patients regarding missed or unscheduled appointments Detail Level: Simple

## 2024-04-15 NOTE — PROGRESS NOTE ADULT - PROBLEM SELECTOR PLAN 1
Type 2 diabetes mellitus with hyperglycemia  - Continue to titrate dextrose IVF off as able. Limit dextrose IVP if able to reduce reactive hypoglycemia.  - If able to titrate dextrose IVF off and FSG falls below <55 mg/dL OFF of IVF, please obtain the following prior to treatment: C-peptide, insulin, proinsulin, BMP, BHB.  - Once dextrose is off, start lispro low dose sliding scale before meals and at bedtime.  - Patient can follow up at discharge with Dr Joseph on 7/22/2024 At 10AM at [facility]. Additional Notes: Discussed risks of excision. Referred to Dr. Gallardo if patient wishes to have lipoma excised. Type 2 diabetes mellitus with hyperglycemia  - Stop dextrose IVF tomorrow morning and when FSG falls below <55 mg/dL, please obtain the following prior to treatment: C-peptide, insulin, proinsulin, BMP, BHB.   - Limit dextrose IVP if able to reduce reactive hypoglycemia.  - Repeat AM cortisol and ACTH tomorrow.   - Patient can follow up at discharge with Dr Joseph on 7/22/2024 At 10AM at [facility].

## 2024-04-16 ENCOUNTER — APPOINTMENT (OUTPATIENT)
Dept: INFECTIOUS DISEASE | Facility: CLINIC | Age: 44
End: 2024-04-16

## 2024-04-16 ENCOUNTER — TRANSCRIPTION ENCOUNTER (OUTPATIENT)
Age: 44
End: 2024-04-16

## 2024-04-16 LAB
ACTH SER-ACNC: 29.8 PG/ML — SIGNIFICANT CHANGE UP (ref 7.2–63.3)
ALBUMIN SERPL ELPH-MCNC: 3.4 G/DL — SIGNIFICANT CHANGE UP (ref 3.3–5)
ALP SERPL-CCNC: 140 U/L — HIGH (ref 40–120)
ALT FLD-CCNC: 7 U/L — LOW (ref 10–45)
ANION GAP SERPL CALC-SCNC: 11 MMOL/L — SIGNIFICANT CHANGE UP (ref 5–17)
AST SERPL-CCNC: 13 U/L — SIGNIFICANT CHANGE UP (ref 10–40)
BASOPHILS # BLD AUTO: 0.02 K/UL — SIGNIFICANT CHANGE UP (ref 0–0.2)
BASOPHILS NFR BLD AUTO: 0.2 % — SIGNIFICANT CHANGE UP (ref 0–2)
BILIRUB SERPL-MCNC: 0.2 MG/DL — SIGNIFICANT CHANGE UP (ref 0.2–1.2)
BUN SERPL-MCNC: 12 MG/DL — SIGNIFICANT CHANGE UP (ref 7–23)
CALCIUM SERPL-MCNC: 9 MG/DL — SIGNIFICANT CHANGE UP (ref 8.4–10.5)
CHLORIDE SERPL-SCNC: 103 MMOL/L — SIGNIFICANT CHANGE UP (ref 96–108)
CO2 SERPL-SCNC: 24 MMOL/L — SIGNIFICANT CHANGE UP (ref 22–31)
CORTIS AM PEAK SERPL-MCNC: 15.3 UG/DL — SIGNIFICANT CHANGE UP (ref 6.02–18.4)
CREAT SERPL-MCNC: 0.89 MG/DL — SIGNIFICANT CHANGE UP (ref 0.5–1.3)
EGFR: 82 ML/MIN/1.73M2 — SIGNIFICANT CHANGE UP
EOSINOPHIL # BLD AUTO: 0.22 K/UL — SIGNIFICANT CHANGE UP (ref 0–0.5)
EOSINOPHIL NFR BLD AUTO: 2.1 % — SIGNIFICANT CHANGE UP (ref 0–6)
GLUCOSE BLDC GLUCOMTR-MCNC: 152 MG/DL — HIGH (ref 70–99)
GLUCOSE BLDC GLUCOMTR-MCNC: 155 MG/DL — HIGH (ref 70–99)
GLUCOSE BLDC GLUCOMTR-MCNC: 181 MG/DL — HIGH (ref 70–99)
GLUCOSE BLDC GLUCOMTR-MCNC: 186 MG/DL — HIGH (ref 70–99)
GLUCOSE BLDC GLUCOMTR-MCNC: 205 MG/DL — HIGH (ref 70–99)
GLUCOSE SERPL-MCNC: 109 MG/DL — HIGH (ref 70–99)
HCT VFR BLD CALC: 37.8 % — SIGNIFICANT CHANGE UP (ref 34.5–45)
HGB BLD-MCNC: 10.5 G/DL — LOW (ref 11.5–15.5)
IMM GRANULOCYTES NFR BLD AUTO: 0.5 % — SIGNIFICANT CHANGE UP (ref 0–0.9)
LYMPHOCYTES # BLD AUTO: 2.56 K/UL — SIGNIFICANT CHANGE UP (ref 1–3.3)
LYMPHOCYTES # BLD AUTO: 24.2 % — SIGNIFICANT CHANGE UP (ref 13–44)
MAGNESIUM SERPL-MCNC: 2.1 MG/DL — SIGNIFICANT CHANGE UP (ref 1.6–2.6)
MCHC RBC-ENTMCNC: 19.7 PG — LOW (ref 27–34)
MCHC RBC-ENTMCNC: 27.8 GM/DL — LOW (ref 32–36)
MCV RBC AUTO: 70.8 FL — LOW (ref 80–100)
MONOCYTES # BLD AUTO: 0.82 K/UL — SIGNIFICANT CHANGE UP (ref 0–0.9)
MONOCYTES NFR BLD AUTO: 7.7 % — SIGNIFICANT CHANGE UP (ref 2–14)
NEUTROPHILS # BLD AUTO: 6.92 K/UL — SIGNIFICANT CHANGE UP (ref 1.8–7.4)
NEUTROPHILS NFR BLD AUTO: 65.3 % — SIGNIFICANT CHANGE UP (ref 43–77)
NRBC # BLD: 0 /100 WBCS — SIGNIFICANT CHANGE UP (ref 0–0)
PHOSPHATE SERPL-MCNC: 3.5 MG/DL — SIGNIFICANT CHANGE UP (ref 2.5–4.5)
PLATELET # BLD AUTO: 503 K/UL — HIGH (ref 150–400)
POTASSIUM SERPL-MCNC: 4.5 MMOL/L — SIGNIFICANT CHANGE UP (ref 3.5–5.3)
POTASSIUM SERPL-SCNC: 4.5 MMOL/L — SIGNIFICANT CHANGE UP (ref 3.5–5.3)
PROT SERPL-MCNC: 7 G/DL — SIGNIFICANT CHANGE UP (ref 6–8.3)
RBC # BLD: 5.34 M/UL — HIGH (ref 3.8–5.2)
RBC # FLD: 18.9 % — HIGH (ref 10.3–14.5)
SODIUM SERPL-SCNC: 138 MMOL/L — SIGNIFICANT CHANGE UP (ref 135–145)
WBC # BLD: 10.59 K/UL — HIGH (ref 3.8–10.5)
WBC # FLD AUTO: 10.59 K/UL — HIGH (ref 3.8–10.5)

## 2024-04-16 PROCEDURE — 99232 SBSQ HOSP IP/OBS MODERATE 35: CPT

## 2024-04-16 PROCEDURE — 99233 SBSQ HOSP IP/OBS HIGH 50: CPT | Mod: GC

## 2024-04-16 RX ORDER — FAMOTIDINE 10 MG/ML
1 INJECTION INTRAVENOUS
Qty: 60 | Refills: 0
Start: 2024-04-16 | End: 2024-05-15

## 2024-04-16 RX ORDER — INSULIN GLARGINE 100 [IU]/ML
15 INJECTION, SOLUTION SUBCUTANEOUS AT BEDTIME
Refills: 0 | Status: DISCONTINUED | OUTPATIENT
Start: 2024-04-16 | End: 2024-04-17

## 2024-04-16 RX ORDER — OXYCODONE HYDROCHLORIDE 5 MG/1
5 TABLET ORAL ONCE
Refills: 0 | Status: DISCONTINUED | OUTPATIENT
Start: 2024-04-16 | End: 2024-04-16

## 2024-04-16 RX ORDER — SEMAGLUTIDE 0.68 MG/ML
0 INJECTION, SOLUTION SUBCUTANEOUS
Refills: 0 | DISCHARGE

## 2024-04-16 RX ORDER — INSULIN HUMAN 100 [IU]/ML
60 INJECTION, SOLUTION SUBCUTANEOUS
Refills: 0 | DISCHARGE

## 2024-04-16 RX ORDER — LANOLIN ALCOHOL/MO/W.PET/CERES
5 CREAM (GRAM) TOPICAL ONCE
Refills: 0 | Status: COMPLETED | OUTPATIENT
Start: 2024-04-16 | End: 2024-04-16

## 2024-04-16 RX ORDER — INSULIN LISPRO 100/ML
5 VIAL (ML) SUBCUTANEOUS
Refills: 0 | Status: DISCONTINUED | OUTPATIENT
Start: 2024-04-16 | End: 2024-04-17

## 2024-04-16 RX ORDER — KETOROLAC TROMETHAMINE 30 MG/ML
15 SYRINGE (ML) INJECTION ONCE
Refills: 0 | Status: DISCONTINUED | OUTPATIENT
Start: 2024-04-16 | End: 2024-04-16

## 2024-04-16 RX ADMIN — Medication 15 MILLIGRAM(S): at 23:10

## 2024-04-16 RX ADMIN — CLOPIDOGREL BISULFATE 75 MILLIGRAM(S): 75 TABLET, FILM COATED ORAL at 11:41

## 2024-04-16 RX ADMIN — Medication 2 MILLIGRAM(S): at 15:24

## 2024-04-16 RX ADMIN — Medication 1 APPLICATORFUL: at 23:37

## 2024-04-16 RX ADMIN — OXYCODONE HYDROCHLORIDE 5 MILLIGRAM(S): 5 TABLET ORAL at 01:55

## 2024-04-16 RX ADMIN — OXYCODONE HYDROCHLORIDE 5 MILLIGRAM(S): 5 TABLET ORAL at 17:12

## 2024-04-16 RX ADMIN — APIXABAN 5 MILLIGRAM(S): 2.5 TABLET, FILM COATED ORAL at 17:53

## 2024-04-16 RX ADMIN — INSULIN GLARGINE 15 UNIT(S): 100 INJECTION, SOLUTION SUBCUTANEOUS at 22:14

## 2024-04-16 RX ADMIN — Medication 15 MILLIGRAM(S): at 22:16

## 2024-04-16 RX ADMIN — Medication 650 MILLIGRAM(S): at 22:13

## 2024-04-16 RX ADMIN — NORTRIPTYLINE HYDROCHLORIDE 10 MILLIGRAM(S): 10 CAPSULE ORAL at 22:12

## 2024-04-16 RX ADMIN — OXYCODONE HYDROCHLORIDE 5 MILLIGRAM(S): 5 TABLET ORAL at 16:12

## 2024-04-16 RX ADMIN — Medication 5 MILLIGRAM(S): at 22:13

## 2024-04-16 RX ADMIN — FAMOTIDINE 20 MILLIGRAM(S): 10 INJECTION INTRAVENOUS at 05:53

## 2024-04-16 RX ADMIN — GABAPENTIN 600 MILLIGRAM(S): 400 CAPSULE ORAL at 05:53

## 2024-04-16 RX ADMIN — OXYCODONE HYDROCHLORIDE 5 MILLIGRAM(S): 5 TABLET ORAL at 02:55

## 2024-04-16 RX ADMIN — Medication 650 MILLIGRAM(S): at 23:10

## 2024-04-16 RX ADMIN — ATORVASTATIN CALCIUM 80 MILLIGRAM(S): 80 TABLET, FILM COATED ORAL at 22:13

## 2024-04-16 RX ADMIN — Medication 5 MILLIGRAM(S): at 02:03

## 2024-04-16 RX ADMIN — Medication 2 MILLIGRAM(S): at 19:39

## 2024-04-16 RX ADMIN — FAMOTIDINE 20 MILLIGRAM(S): 10 INJECTION INTRAVENOUS at 17:53

## 2024-04-16 RX ADMIN — APIXABAN 5 MILLIGRAM(S): 2.5 TABLET, FILM COATED ORAL at 05:52

## 2024-04-16 RX ADMIN — GABAPENTIN 600 MILLIGRAM(S): 400 CAPSULE ORAL at 13:20

## 2024-04-16 RX ADMIN — GABAPENTIN 600 MILLIGRAM(S): 400 CAPSULE ORAL at 22:13

## 2024-04-16 NOTE — DISCHARGE NOTE PROVIDER - NSDCCPCAREPLAN_GEN_ALL_CORE_FT
PRINCIPAL DISCHARGE DIAGNOSIS  Diagnosis: Hypoglycemia  Assessment and Plan of Treatment: Hypoglycemia is a condition in which your blood sugar (glucose) level is lower than the standard range.     PRINCIPAL DISCHARGE DIAGNOSIS  Diagnosis: Hypoglycemia  Assessment and Plan of Treatment: Hypoglycemia is a condition in which your blood sugar (glucose) level is lower than the standard range. Our endocrinology team saw you and managed your care throughout your stay. Your insulin was stopped entirely, as was your ozempic. You were put on a dextrose drip, which was stopped prior to your discharge. You did not have further episodes of hypoglycemia prior to your discharge.   **Please stop your insulin and ozempic until you follow up with Dr. Joseph, your outpatient endcrinologist.     PRINCIPAL DISCHARGE DIAGNOSIS  Diagnosis: Hypoglycemia  Assessment and Plan of Treatment: Hypoglycemia is a condition in which your blood sugar (glucose) level is lower than the standard range. Our endocrinology team saw you and managed your care throughout your stay. Your insulin was stopped entirely, as was your ozempic. You were put on a dextrose drip, which was stopped prior to your discharge. You did not have further episodes of hypoglycemia prior to your discharge.   DISCHARGE INSTRUCTIONS: Please start taking Januvia 100mg once a day before breakfast and use the Lispro low dose insulin sliding scale.  When your fasting fingersticks are greater than 200 mg/dL, take lantus 10units at bed time. Please stop taking Ozempic.

## 2024-04-16 NOTE — DISCHARGE NOTE PROVIDER - ATTENDING DISCHARGE PHYSICAL EXAMINATION:
Pt is 45 yo woman with obesity, abdominal hernia pending repair, colostomy, CAD with multiple stents, recent admission for hypoglycemia, recent change in insulin regimen, as per dexcom improved glycemic control over the past 3 month, admitted for 'not feeling well'. Pt was found to be hypoglycemic on this admission and started on D5 drip.  Hypoglycemia resolved with improved PO intake and cessation of insulin therapy. Pt should follow up with her Endocrinologist for subsequent DM plan. Ozempic may be re-started as outpatient by pt's MD.   Chronic back, leg, abdominal pain will avoid opiates as pt complain of nausea and poor PO intake that can make hypoglycemia worse. Cont with neurontin and tylenol   Colostomy care as per protocol.      Pt is 45 yo woman with obesity, abdominal hernia pending repair, colostomy, CAD with multiple stents, recent admission for hypoglycemia, recent change in insulin regimen, as per dexcom improved glycemic control over the past 3 month, admitted for 'not feeling well'. Pt was found to be hypoglycemic on this admission and started on D5 drip.  Hypoglycemia resolved with improved PO intake and cessation of insulin therapy. Pt should follow up with her Endocrinologist for subsequent DM plan. Ozempic may be re-started as outpatient by pt's MD.   Chronic back, leg, abdominal pain will avoid opiates as pt complain of nausea and poor PO intake that can make hypoglycemia worse. Cont with neurontin and tylenol   Colostomy care as per protocol.   DC note to be given today

## 2024-04-16 NOTE — PROGRESS NOTE ADULT - PROBLEM SELECTOR PLAN 1
Most recent POCT A1c 8%, likely inaccurate given her labile sugars and frequently low sugars. C peptide 9.7 in May of 2023. Patient likely with gastroparesis fair leading to nausea and vomiting and poor PO and likely hypoglycemia from poor PO and endogenous insulin production. Home medications: Ozempic 1g qweek (last injection 4/2), Lantus 40u qhs, Lispro 15u TID with meals. Several episodes of BG <60 since admission, no BG levels >200    - hold off insulin, f/u endo recs  - FSG q6h; pt continues to have hypglycemic episodes, w/ fingersticks in the 50s, most recently 4/14 6:00pm which improved w/ juice  - D5W LR dc'd 4/16 AM, fingersticks wnl   - QTc >500 on admission, 480 on 4/14, gave 1x zofran 4/15 AM for nausea  bc patient refusing tigan   - Tigan 200mg IM q8h PRN for nausea  - Cortisol level low at 4 from 4/13; f/u repeat AM cortisol and ACTH on 4/16 --> cortisol wnl

## 2024-04-16 NOTE — DISCHARGE NOTE PROVIDER - NSDCMRMEDTOKEN_GEN_ALL_CORE_FT
Admelog 100 units/mL injectable solution: 30 unit(s) injectable 3 times a day (before meals)  apixaban 5 mg oral tablet: 1 tab(s) orally every 12 hours  atorvastatin 80 mg oral tablet: 1 tab(s) orally once a day (at bedtime)  clopidogrel 75 mg oral tablet: 1 tab(s) orally once a day  gabapentin 600 mg oral tablet: 1 tab(s) orally 3 times a day  HumuLIN R (Concentrated) 500 units/mL subcutaneous solution: 60 unit(s) subcutaneous 3 times a day  lactobacillus acidophilus oral capsule: 1 cap(s) orally once a day  metoprolol succinate 50 mg oral tablet, extended release: 1 tab(s) orally once a day  nortriptyline 10 mg oral capsule: 1 cap(s) orally once a day (at bedtime)  nystatin 100,000 units/g topical powder: Apply topically to affected area 2 times a day  Ozempic 2 mg/1.5 mL (1 mg dose) subcutaneous solution: subcutaneous  pantoprazole 40 mg oral delayed release tablet: 1 tab(s) orally once a day (before a meal)   apixaban 5 mg oral tablet: 1 tab(s) orally every 12 hours  atorvastatin 80 mg oral tablet: 1 tab(s) orally once a day (at bedtime)  clopidogrel 75 mg oral tablet: 1 tab(s) orally once a day  clotrimazole 1% vaginal cream with applicator: 1 applicatorful vaginal once a day (at bedtime)  famotidine 20 mg oral tablet: 1 tab(s) orally every 12 hours  gabapentin 600 mg oral tablet: 1 tab(s) orally 3 times a day  lactobacillus acidophilus oral capsule: 1 cap(s) orally once a day  nortriptyline 10 mg oral capsule: 1 cap(s) orally once a day (at bedtime)  nystatin 100,000 units/g topical powder: Apply topically to affected area 2 times a day   apixaban 5 mg oral tablet: 1 tab(s) orally every 12 hours  atorvastatin 80 mg oral tablet: 1 tab(s) orally once a day (at bedtime)  clopidogrel 75 mg oral tablet: 1 tab(s) orally once a day  clotrimazole 1% vaginal cream with applicator: 1 applicatorful vaginal once a day (at bedtime)  famotidine 20 mg oral tablet: 1 tab(s) orally every 12 hours  gabapentin 600 mg oral tablet: 1 tab(s) orally 3 times a day  Januvia 100 mg oral tablet: 1 tab(s) orally once a day before breakfast  lactobacillus acidophilus oral capsule: 1 cap(s) orally once a day  nortriptyline 10 mg oral capsule: 1 cap(s) orally once a day (at bedtime)  nystatin 100,000 units/g topical powder: Apply topically to affected area 2 times a day

## 2024-04-16 NOTE — DISCHARGE NOTE PROVIDER - HOSPITAL COURSE
#Discharge: do not delete    HASMUKH AGUILERA is a 44y Female with a past medical history of _____    Presented with _____, found to have _____    Problem List/Main Diagnoses (system-based):   #     #     #    Patient was discharged to: (home/[facility]/acute rehab/hospice, etc. and w/ home health/home PT/RN/home O2)    New medications:   Changes to old medications:  Medications that were stopped:    Items to follow up as outpatient:    Physical exam at the time of discharge:       LABS & STUDIES:  [facility] (31 Dec 2023 12:46[facility]   #Discharge: do not delete    HASMUKH AGUILERA is a 44y Female with a past medical history of prior CVA in 2021 (some mild residual left sided weakness), PE in 2023 (on Eliquis), multiple abdominal hernia repairs, nec fasc of abdomen in 2021 s/p corbin procedure w diverting ostomy, IDDM2 with complications ( likely gastroparesis) and recent improved A1c 16--->8, CAD with multiple stents (last in June 2023), presenting with nausea and vomiting for 4 days, increased ostomy output, lower abdominal pain and symptomatic hypoglycemia, being admitted for management of her hypoglycemia.        Problem List/Main Diagnoses (system-based):   # Reactive hypoglycemia.   Most recent POCT A1c 8%, likely inaccurate given her labile sugars and frequently low sugars. C peptide 9.7 in May of 2023. Patient likely with gastroparesis fair leading to nausea and vomiting and poor PO and likely hypoglycemia from poor PO and endogenous insulin production. Home medications: Ozempic 1g qweek (last injection 4/2), Lantus 40u qhs, Lispro 15u TID with meals. Several episodes of BG <60 since admission, no BG levels >200. Started on d5 drip. 04/16 AM, dc'd insulin drip. Fingersticks wnl.  Cortisol level low at 4 from 4/13; f/u repeat AM cortisol and ACTH on 4/16; cortisol wnl. ACTH ____.   - f/iu w/ Dr. Joseph (patient's endocrinologist)    #Type 2 diabetes mellitus with other specified complication, with long-term current use of insulin.   Most recent POCT A1c 8%, likely inaccurate given her labile sugars and frequently low sugars. C peptide 9.7 in May of 2023. Patient likely with gastroparesis fair leading to nausea and vomiting and poor PO and likely hypoglycemia from poor PO and endogenous insulin production. Home medication for gastroparesis: Gabapentin 600mg PO TID and Nortriptyline 10mg PO qhs. Home medications: Ozempic 1g qweek (last injection 4/2), Lantus 40u qhs, Lispro 15u TID with meals. Several episodes of BG <60 since admission, no BG levels >200. A1C 8.2 (down from 10.8 in 02/2024); c-peptide 3.1 (down from 9.2 02/2024)  - d/c Ozempic inpatient until patient follows up with Dr. Joseph       #Personal history of pulmonary embolism.   PE in 2023. Home medication is Eliquis 5mg PO BID  - c/w home meds.    #CAD (coronary artery disease).   Multiple stents, last placed 6/2022. Home medication: Plavix 75mg PO daily and Metoprolol succinate XR 50mg PO daily. Lipitor 80mg qhs  - c/w Plavix  - c/w Lipitor.    #History of creation of ostomy.   Nec fasc of abdomen in 2021 s/p corbin procedure w diverting ostomy. Patient w/ loose ostomy output since prior to admission; GI PCR negative.  -f/u outpatient    Patient was discharged to: home    New medications: none  Changes to old medications: none  Medications that were stopped: insulin, ozempic    Items to follow up as outpatient: endocrinologist, PCP    Physical exam at the time of discharge:   Constitutional: restless appearing, has her night eye mask on  HEENT: NC/AT, EOMI, no conjunctival pallor or scleral icterus, MMM  Neck: Supple  Respiratory: CTA B/L. No w/r/r.   Cardiovascular: RRR, normal S1 and S2, no m/r/g.   Gastrointestinal: +BS, soft NTND, no guarding or rebound tenderness, no palpable masses, colostomy bag with loose stool    Extremities: wwp; no cyanosis, clubbing or edema.   Vascular: Pulses equal and strong throughout.   Neurological: AAOx3, no CN deficits grossly  Skin: No gross skin abnormalities or rashes      LABS & STUDIES:  SARS-CoV-2: Linsey (31 Dec 2023 12:46)   #Discharge: do not delete    HASMUKH AGUILERA is a 44y Female with a past medical history of prior CVA in 2021 (some mild residual left sided weakness), PE in 2023 (on Eliquis), multiple abdominal hernia repairs, nec fasc of abdomen in 2021 s/p corbin procedure w diverting ostomy, IDDM2 with complications ( likely gastroparesis) and recent improved A1c 16--->8, CAD with multiple stents (last in June 2023), presenting with nausea and vomiting for 4 days, increased ostomy output, lower abdominal pain and symptomatic hypoglycemia, being admitted for management of her hypoglycemia.        Problem List/Main Diagnoses (system-based):   # Reactive hypoglycemia.   Most recent POCT A1c 8%, likely inaccurate given her labile sugars and frequently low sugars. C peptide 9.7 in May of 2023. Patient likely with gastroparesis fair leading to nausea and vomiting and poor PO and likely hypoglycemia from poor PO and endogenous insulin production. Home medications: Ozempic 1g qweek (last injection 4/2), Lantus 40u qhs, Lispro 15u TID with meals. Several episodes of BG <60 since admission, no BG levels >200. Started on d5 drip. 04/16 AM, dc'd insulin drip. Fingersticks wnl.  Cortisol level low at 4 from 4/13; f/u repeat AM cortisol and ACTH on 4/16; cortisol wnl. ACTH 29.8  - f/iu w/ Dr. Joseph (patient's endocrinologist)    #Type 2 diabetes mellitus with other specified complication, with long-term current use of insulin.   Most recent POCT A1c 8%, likely inaccurate given her labile sugars and frequently low sugars. C peptide 9.7 in May of 2023. Patient likely with gastroparesis fair leading to nausea and vomiting and poor PO and likely hypoglycemia from poor PO and endogenous insulin production. Home medication for gastroparesis: Gabapentin 600mg PO TID and Nortriptyline 10mg PO qhs. Home medications: Ozempic 1g qweek (last injection 4/2), Lantus 40u qhs, Lispro 15u TID with meals. Several episodes of BG <60 since admission, no BG levels >200. A1C 8.2 (down from 10.8 in 02/2024); c-peptide 3.1 (down from 9.2 02/2024)  - d/c Ozempic inpatient until patient follows up with Dr. Joseph       #Personal history of pulmonary embolism.   PE in 2023. Home medication is Eliquis 5mg PO BID  - c/w home meds.    #CAD (coronary artery disease).   Multiple stents, last placed 6/2022. Home medication: Plavix 75mg PO daily and Metoprolol succinate XR 50mg PO daily. Lipitor 80mg qhs  - c/w Plavix  - c/w Lipitor.    #History of creation of ostomy.   Nec fasc of abdomen in 2021 s/p corbin procedure w diverting ostomy. Patient w/ loose ostomy output since prior to admission; GI PCR negative.  -f/u outpatient    Patient was discharged to: home    New medications: none  Changes to old medications: none  Medications that were stopped: insulin, ozempic    Items to follow up as outpatient: endocrinologist, PCP    Physical exam at the time of discharge:   Constitutional: restless appearing, has her night eye mask on  HEENT: NC/AT, EOMI, no conjunctival pallor or scleral icterus, MMM  Neck: Supple  Respiratory: CTA B/L. No w/r/r.   Cardiovascular: RRR, normal S1 and S2, no m/r/g.   Gastrointestinal: +BS, soft NTND, no guarding or rebound tenderness, no palpable masses, colostomy bag with loose stool    Extremities: wwp; no cyanosis, clubbing or edema.   Vascular: Pulses equal and strong throughout.   Neurological: AAOx3, no CN deficits grossly  Skin: No gross skin abnormalities or rashes      LABS & STUDIES:  SARS-CoV-2: [facility] (31 Dec 2023 12:46)   #Discharge:    HASMUKH AGUILERA is a 44y Female with a past medical history of prior CVA in 2021 (some mild residual left sided weakness), PE in 2023 (on Eliquis), multiple abdominal hernia repairs, nec fasc of abdomen in 2021 s/p corbin procedure w diverting ostomy, IDDM2 with complications ( likely gastroparesis) and recent improved A1c 16--->8, CAD with multiple stents (last in June 2023), presenting with nausea and vomiting for 4 days, increased ostomy output, lower abdominal pain and symptomatic hypoglycemia, being admitted for management of her hypoglycemia.        Problem List/Main Diagnoses (system-based):   # Reactive hypoglycemia.   Most recent POCT A1c 8%, likely inaccurate given her labile sugars and frequently low sugars. C peptide 9.7 in May of 2023. Patient likely with gastroparesis fair leading to nausea and vomiting and poor PO and likely hypoglycemia from poor PO and endogenous insulin production. Home medications: Ozempic 1g qweek (last injection 4/2), Lantus 40u qhs, Lispro 15u TID with meals. Several episodes of BG <60 since admission, no BG levels >200. Started on d5 drip. 04/16 AM, dc'd insulin drip. Fingersticks wnl.  Cortisol level low at 4 from 4/13; f/u repeat AM cortisol and ACTH on 4/16; cortisol wnl. ACTH 29.8  - f/iu w/ Dr. Joseph (patient's endocrinologist)    #Type 2 diabetes mellitus with other specified complication, with long-term current use of insulin.   Most recent POCT A1c 8%, likely inaccurate given her labile sugars and frequently low sugars. C peptide 9.7 in May of 2023. Patient likely with gastroparesis fair leading to nausea and vomiting and poor PO and likely hypoglycemia from poor PO and endogenous insulin production. Home medication for gastroparesis: Gabapentin 600mg PO TID and Nortriptyline 10mg PO qhs. Home medications: Ozempic 1g qweek (last injection 4/2), Lantus 40u qhs, Lispro 15u TID with meals. Several episodes of BG <60 since admission, no BG levels >200. A1C 8.2 (down from 10.8 in 02/2024); c-peptide 3.1 (down from 9.2 02/2024)  - d/c Ozempic inpatient until patient follows up with Dr. Joseph       #Personal history of pulmonary embolism.   PE in 2023. Home medication is Eliquis 5mg PO BID  - c/w home meds.    #CAD (coronary artery disease).   Multiple stents, last placed 6/2022. Home medication: Plavix 75mg PO daily and Metoprolol succinate XR 50mg PO daily. Lipitor 80mg qhs  - c/w Plavix  - c/w Lipitor.    #History of creation of ostomy.   Nec fasc of abdomen in 2021 s/p corbin procedure w diverting ostomy. Patient w/ loose ostomy output since prior to admission; GI PCR negative.  -f/u outpatient    Patient was discharged to: home    New medications: Januvia 100mg once a day before breakfast, Lispro low dose insulin sliding scale, When your fasting fingersticks are greater than 200 take lantus 10units at bed time.   Changes to old medications: none  Medications that were stopped: Stop Ozempic      Items to follow up as outpatient: endocrinologist, PCP    Physical exam at the time of discharge:   Constitutional: restless appearing, has her night eye mask on  HEENT: NC/AT, EOMI, no conjunctival pallor or scleral icterus, MMM  Neck: Supple  Respiratory: CTA B/L. No w/r/r.   Cardiovascular: RRR, normal S1 and S2, no m/r/g.   Gastrointestinal: +BS, soft NTND, no guarding or rebound tenderness, no palpable masses, colostomy bag with loose stool    Extremities: wwp; no cyanosis, clubbing or edema.   Vascular: Pulses equal and strong throughout.   Neurological: AAOx3, no CN deficits grossly  Skin: No gross skin abnormalities or rashes      LABS & STUDIES:  SARS-CoV-2: NotDetec (31 Dec 2023 12:46)

## 2024-04-16 NOTE — DISCHARGE NOTE PROVIDER - CARE PROVIDER_API CALL
wait time explained/warm blanket provided/plan of care explained plan of care explained/side rails up/wait time explained/darkened lights Pelon Sánchez  Internal Medicine  [address] [place] [address] [place] [address]  Phone: [phone]  Fax: [phone]  Established Patient  Scheduled Appointment: 04/18/2024

## 2024-04-16 NOTE — PROGRESS NOTE ADULT - SUBJECTIVE AND OBJECTIVE BOX
SUBJECTIVE / INTERVAL HPI: Patient was seen and examined this morning. Complaining for abdominal pain described as cramping only relieved by opioids. Reports continued water output from ostomy, though when I assess there was soft formed stool in bag. Dr Joseph (endo) confirmed that she was under the impression that patient was still taking U500 and admelog at last visit in March, though patient continued to deny. However, this would explain the hypoglycemia at presentation and its resolution. No hypoglycemia in 48 hours. Dextrose IVF @ 50ml/hr stopped this morning, and glucose is being maintained. She did not eat anything yesterday, but ate cereal, banana, and orange this morning.    CAPILLARY BLOOD GLUCOSE & INSULIN RECEIVED  121 mg/dL (04-15 @ 05:30)  131 mg/dL (04-15 @ 09:08)  160 mg/dL (04-15 @ 12:58)  113 mg/dL (04-15 @ 17:52)  150 mg/dL (04-15 @ 22:30)  109 mg/dL (04-16 @ 05:30)  152 mg/dL (04-16 @ 07:00)  155 mg/dL (04-16 @ 09:22)  181 mg/dL (04-16 @ 12:18)      REVIEW OF SYSTEMS  See HPI.    PHYSICAL EXAM  Vital Signs Last 24 Hrs  T(C): 36.9 (16 Apr 2024 12:27), Max: 37.1 (15 Apr 2024 20:24)  T(F): 98.4 (16 Apr 2024 12:27), Max: 98.8 (15 Apr 2024 20:24)  HR: 91 (16 Apr 2024 12:27) (87 - 100)  BP: 127/67 (16 Apr 2024 12:27) (105/62 - 131/79)  BP(mean): --  RR: 18 (16 Apr 2024 12:27) (18 - 18)  SpO2: 98% (16 Apr 2024 12:27) (96% - 98%)    Parameters below as of 16 Apr 2024 12:27  Patient On (Oxygen Delivery Method): room air    Constitutional: Awake, alert, in no acute distress.   HEENT: Normocephalic, atraumatic, NAOMI.  Respiratory: Lungs clear to ausculation bilaterally.   Cardiovascular: regular rhythm, normal S1 and S2, no audible murmurs.   GI: soft, non-tender, non-distended, bowel sounds present.  Extremities: No lower extremity edema.  Psychiatric: AAO x 3. Normal affect/mood.     LABS  CBC - WBC/HGB/HTC/PLT: 10.59/10.5/37.8/503 (04-16-24)  BMP - Na/K/Cl/Bicarb/BUN/Cr/Gluc/AG/eGFR: 138/4.5/103/24/12/0.89/109/11/82 (04-16-24)  Ca - 9.0 (04-16-24)  Phos - 3.5 (04-16-24)  Mg - 2.1 (04-16-24)  LFT - Alb/Tprot/Tbili/Dbili/AlkPhos/ALT/AST: 3.4/--/0.2/--/140/7/13 (04-16-24)    Thyroid Stimulating Hormone, Serum: 2.700 (04-15-24)  Total T4/Free T4: --/1.170 (04-15-24)    MEDICATIONS  MEDICATIONS  (STANDING):  acetaminophen     Tablet .. 650 milliGRAM(s) Oral every 6 hours  apixaban 5 milliGRAM(s) Oral every 12 hours  atorvastatin 80 milliGRAM(s) Oral at bedtime  clopidogrel Tablet 75 milliGRAM(s) Oral daily  clotrimazole 1% Vaginal Cream 1 Applicatorful Vaginal at bedtime  dextrose 10% Bolus 125 milliLiter(s) IV Bolus once  dextrose 5%. 1000 milliLiter(s) (50 mL/Hr) IV Continuous <Continuous>  dextrose 5%. 1000 milliLiter(s) (100 mL/Hr) IV Continuous <Continuous>  dextrose 50% Injectable 12.5 Gram(s) IV Push once  dextrose 50% Injectable 25 Gram(s) IV Push once  famotidine    Tablet 20 milliGRAM(s) Oral every 12 hours  gabapentin 600 milliGRAM(s) Oral three times a day  glucagon  Injectable 1 milliGRAM(s) IntraMuscular once  influenza   Vaccine 0.5 milliLiter(s) IntraMuscular once  lidocaine   4% Patch 1 Patch Transdermal once  melatonin 5 milliGRAM(s) Oral at bedtime  nortriptyline 10 milliGRAM(s) Oral at bedtime    MEDICATIONS  (PRN):  dextrose Oral Gel 15 Gram(s) Oral once PRN Blood Glucose LESS THAN 70 milliGRAM(s)/deciliter  loperamide 2 milliGRAM(s) Oral every 4 hours PRN increased ostomy output  trimethobenzamide Injectable 200 milliGRAM(s) IntraMuscular every 8 hours PRN Nausea and/or Vomiting

## 2024-04-16 NOTE — DISCHARGE NOTE PROVIDER - NSDCFUADDAPPT_GEN_ALL_CORE_FT
Please follow up with Dr. Joseph on 7/22/2024 At 10AM  [facility]  [address] [place], [place] [id number] Please follow up with Dr. Joseph on 7/22/2024 At 10AM  [facility]  [address] [place] [address]    DISCHARGE INSTRUCTIONS: Please start taking Januvia 100mg once a day before breakfast and use the Lispro low dose insulin sliding scale.  When your fasting fingersticks are greater than 200 mg/dL, take lantus 10units at bed time. Please stop taking Ozempic.

## 2024-04-16 NOTE — PROGRESS NOTE ADULT - PROBLEM SELECTOR PLAN 5
nec fasc of abdomen in 2021 s/p corbin procedure w diverting ostomy    - patient w/ loose ostomy output since admission; GI PCR negative

## 2024-04-16 NOTE — PROGRESS NOTE ADULT - ASSESSMENT
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  In ED, patient was tachycardic but VS otherwise stable. Her BG was initially 46 and she had a mild WBC and elevated lactate. She improved on a D5 drip and was trialed off, however BG started downtrending again and was restarted. Suspect current episode triggered by restarting ozempic at increased dose.   Of note, patient is aggressively trying to achieve A1C <8% in order to have hernia repaired.    Likely hyperinsulinemic. Avoid overtreating lows with dextrose IVP or large quantities of juice whenever possible to avoid stimulating excess endogenous insulin production.   AM Cortisol level was 4, 2 days after receiving solumedrol, likely still some suppression. No other s/s of adrenal insufficiency. TFTs normal. Repeat AM cortisol was 15.3. R/O adrenal insufficiency as contributing factor to hypoglycemia.    A1C: 8.8% (March 2024),  10.8 % (Feb 2024), 16% (Jan 1, 2024)  C-peptide 9.7, Insulin abs negative, free insulin 49 (high, 0-17) with  (May 2023)  BUN: 12  Creatinine: 0.89  GFR: 82  Weight: 111.6  BMI: 38.5   43F PMH with prior CVA in 2021 (some mild residual left sided weakness), PE in 2023 (on Eliquis), multiple abdominal hernia repairs, nec fasc of abdomen in 2021 s/p corbin procedure w diverting ostomy, IDDM2 with complications ( likely gastroparesis) and recent improved A1c 16--->8, CAD with multiple stents (last in June 2023), presenting with nausea and vomiting for 4 days, increased ostomy output, lower abdominal pain and symptomatic hypoglycemia, being admitted for management of her hypoglycemia.  In ED, patient was tachycardic but VS otherwise stable. Her BG was initially 46 and she had a mild WBC and elevated lactate. She improved on a D5 drip and was trialed off, however BG started downtrending again and was restarted. Suspect current episode triggered by restarting ozempic at increased dose.   Of note, patient is aggressively trying to achieve A1C <8% in order to have hernia repaired.    Maintaining glucose off dextrose IVF. Eating somewhat.  Likely hyperinsulinemic. Avoid overtreating lows with dextrose IVP or large quantities of juice whenever possible to avoid stimulating excess endogenous insulin production.   AM Cortisol level was 4, 2 days after receiving solumedrol, likely still some suppression. No other s/s of adrenal insufficiency. TFTs normal. Repeat AM cortisol was 15.3. R/O adrenal insufficiency as contributing factor to hypoglycemia.    A1C: 8.8% (March 2024),  10.8 % (Feb 2024), 16% (Jan 1, 2024)  C-peptide 9.7, Insulin abs negative, free insulin 49 (high, 0-17) with  (May 2023)  BUN: 12  Creatinine: 0.89  GFR: 82  Weight: 111.6  BMI: 38.5

## 2024-04-16 NOTE — PROGRESS NOTE ADULT - SUBJECTIVE AND OBJECTIVE BOX
**INCOMPLETE NOTE    OVERNIGHT EVENTS:    SUBJECTIVE:  Patient seen and examined at bedside.    Vital Signs Last 12 Hrs  T(F): 97.6 (04-16-24 @ 06:20), Max: 97.6 (04-16-24 @ 06:20)  HR: 87 (04-16-24 @ 06:20) (87 - 87)  BP: 105/62 (04-16-24 @ 06:20) (105/62 - 105/62)  BP(mean): --  RR: 18 (04-16-24 @ 06:20) (18 - 18)  SpO2: 96% (04-16-24 @ 06:20) (96% - 96%)  I&O's Summary      PHYSICAL EXAM:  Constitutional: NAD, comfortable in bed.  HEENT: NC/AT, PERRLA, EOMI, no conjunctival pallor or scleral icterus, MMM  Neck: Supple, no JVD  Respiratory: CTA B/L. No w/r/r.   Cardiovascular: RRR, normal S1 and S2, no m/r/g.   Gastrointestinal: +BS, soft NTND, no guarding or rebound tenderness, no palpable masses   Extremities: wwp; no cyanosis, clubbing or edema.   Vascular: Pulses equal and strong throughout.   Neurological: AAOx3, no CN deficits, strength and sensation intact throughout.   Skin: No gross skin abnormalities or rashes        LABS:                        10.5   10.59 )-----------( 503      ( 16 Apr 2024 05:30 )             37.8     04-16    138  |  103  |  12  ----------------------------<  109<H>  4.5   |  24  |  0.89    Ca    9.0      16 Apr 2024 05:30  Phos  3.5     04-16  Mg     2.1     04-16    TPro  7.0  /  Alb  3.4  /  TBili  0.2  /  DBili  x   /  AST  13  /  ALT  7<L>  /  AlkPhos  140<H>  04-16      Urinalysis Basic - ( 16 Apr 2024 05:30 )    Color: x / Appearance: x / SG: x / pH: x  Gluc: 109 mg/dL / Ketone: x  / Bili: x / Urobili: x   Blood: x / Protein: x / Nitrite: x   Leuk Esterase: x / RBC: x / WBC x   Sq Epi: x / Non Sq Epi: x / Bacteria: x          RADIOLOGY & ADDITIONAL TESTS:    MEDICATIONS  (STANDING):  acetaminophen     Tablet .. 650 milliGRAM(s) Oral every 6 hours  apixaban 5 milliGRAM(s) Oral every 12 hours  atorvastatin 80 milliGRAM(s) Oral at bedtime  clopidogrel Tablet 75 milliGRAM(s) Oral daily  clotrimazole 1% Vaginal Cream 1 Applicatorful Vaginal at bedtime  dextrose 10% Bolus 125 milliLiter(s) IV Bolus once  dextrose 5%. 1000 milliLiter(s) (100 mL/Hr) IV Continuous <Continuous>  dextrose 5%. 1000 milliLiter(s) (50 mL/Hr) IV Continuous <Continuous>  dextrose 50% Injectable 12.5 Gram(s) IV Push once  dextrose 50% Injectable 25 Gram(s) IV Push once  famotidine    Tablet 20 milliGRAM(s) Oral every 12 hours  gabapentin 600 milliGRAM(s) Oral three times a day  glucagon  Injectable 1 milliGRAM(s) IntraMuscular once  influenza   Vaccine 0.5 milliLiter(s) IntraMuscular once  lidocaine   4% Patch 1 Patch Transdermal once  melatonin 5 milliGRAM(s) Oral at bedtime  nortriptyline 10 milliGRAM(s) Oral at bedtime    MEDICATIONS  (PRN):  dextrose Oral Gel 15 Gram(s) Oral once PRN Blood Glucose LESS THAN 70 milliGRAM(s)/deciliter  loperamide 2 milliGRAM(s) Oral every 4 hours PRN increased ostomy output  trimethobenzamide Injectable 200 milliGRAM(s) IntraMuscular every 8 hours PRN Nausea and/or Vomiting   OVERNIGHT EVENTS: c/o pain, crying and frustrating about being in pain all day. states [facility] isn't working, oxy 2.5mg ordered last night didn't touch her; agreed to give oxy 5mg two doses total 4 hours apart; ordered melatonin    SUBJECTIVE:  Patient seen and examined at bedside. Patient c/o ostomy output. Denies sob, chest pain/pressure, palpitations.     Vital Signs Last 12 Hrs  T(F): 97.6 (04-16-24 @ 06:20), Max: 97.6 (04-16-24 @ 06:20)  HR: 87 (04-16-24 @ 06:20) (87 - 87)  BP: 105/62 (04-16-24 @ 06:20) (105/62 - 105/62)  BP(mean): --  RR: 18 (04-16-24 @ 06:20) (18 - 18)  SpO2: 96% (04-16-24 @ 06:20) (96% - 96%)  I&O's Summary      PHYSICAL EXAM:  Constitutional: NAD, has her night eye mask on  HEENT: NC/AT, EOMI, no conjunctival pallor or scleral icterus, MMM  Neck: Supple  Respiratory: CTA B/L. No w/r/r.   Cardiovascular: RRR, normal S1 and S2, no m/r/g.   Gastrointestinal: +BS, soft NTND, no guarding or rebound tenderness, no palpable masses, colostomy bag with loose stool    Extremities: wwp; no cyanosis, clubbing or edema.   Vascular: Pulses equal and strong throughout.   Neurological: AAOx3, no CN deficits grossly  Skin: No gross skin abnormalities or rashes        LABS:                        10.5   10.59 )-----------( 503      ( 16 Apr 2024 05:30 )             37.8     04-16    138  |  103  |  12  ----------------------------<  109<H>  4.5   |  24  |  0.89    Ca    9.0      16 Apr 2024 05:30  Phos  3.5     04-16  Mg     2.1     04-16    TPro  7.0  /  Alb  3.4  /  TBili  0.2  /  DBili  x   /  AST  13  /  ALT  7<L>  /  AlkPhos  140<H>  04-16      Urinalysis Basic - ( 16 Apr 2024 05:30 )    Color: x / Appearance: x / SG: x / pH: x  Gluc: 109 mg/dL / Ketone: x  / Bili: x / Urobili: x   Blood: x / Protein: x / Nitrite: x   Leuk Esterase: x / RBC: x / WBC x   Sq Epi: x / Non Sq Epi: x / Bacteria: x          RADIOLOGY & ADDITIONAL TESTS:    MEDICATIONS  (STANDING):  acetaminophen     Tablet .. 650 milliGRAM(s) Oral every 6 hours  apixaban 5 milliGRAM(s) Oral every 12 hours  atorvastatin 80 milliGRAM(s) Oral at bedtime  clopidogrel Tablet 75 milliGRAM(s) Oral daily  clotrimazole 1% Vaginal Cream 1 Applicatorful Vaginal at bedtime  dextrose 10% Bolus 125 milliLiter(s) IV Bolus once  dextrose 5%. 1000 milliLiter(s) (100 mL/Hr) IV Continuous <Continuous>  dextrose 5%. 1000 milliLiter(s) (50 mL/Hr) IV Continuous <Continuous>  dextrose 50% Injectable 12.5 Gram(s) IV Push once  dextrose 50% Injectable 25 Gram(s) IV Push once  famotidine    Tablet 20 milliGRAM(s) Oral every 12 hours  gabapentin 600 milliGRAM(s) Oral three times a day  glucagon  Injectable 1 milliGRAM(s) IntraMuscular once  influenza   Vaccine 0.5 milliLiter(s) IntraMuscular once  lidocaine   4% Patch 1 Patch Transdermal once  melatonin 5 milliGRAM(s) Oral at bedtime  nortriptyline 10 milliGRAM(s) Oral at bedtime    MEDICATIONS  (PRN):  dextrose Oral Gel 15 Gram(s) Oral once PRN Blood Glucose LESS THAN 70 milliGRAM(s)/deciliter  loperamide 2 milliGRAM(s) Oral every 4 hours PRN increased ostomy output  trimethobenzamide Injectable 200 milliGRAM(s) IntraMuscular every 8 hours PRN Nausea and/or Vomiting

## 2024-04-16 NOTE — DISCHARGE NOTE PROVIDER - NSDCCPTREATMENT_GEN_ALL_CORE_FT
PRINCIPAL PROCEDURE  Procedure: CT abdomen pelvis  Findings and Treatment: BOWEL: Dilated stomach compared to the proximal duodenum. No bowel obstruction. Appendix is normal.  PERITONEUM: No ascites.  VESSELS: Retroaortic left renal vein.  RETROPERITONEUM/LYMPH NODES: No lymphadenopathy.  ABDOMINAL WALL: Status post Mercado's procedure with left lower quadrant   colostomy and fat-containing parastomal hernia. Small bilateral   fat-containing inguinal hernias postsurgical change over the right   gluteal region.  BONES: Ankylosis of L1 and L2 with obliteration of the disc space.   Degenerative changes.  IMPRESSION:  No bowel obstruction.

## 2024-04-16 NOTE — DISCHARGE NOTE PROVIDER - NSDCHC_MEDRECSTATUS_GEN_ALL_CORE
Admission Reconciliation is Completed  Discharge Reconciliation is Not Complete 84 Admission Reconciliation is Completed  Discharge Reconciliation is Completed

## 2024-04-16 NOTE — PROGRESS NOTE ADULT - PROBLEM SELECTOR PLAN 1
Type 2 diabetes mellitus with hyperglycemia  - Stop dextrose IVF tomorrow morning and when FSG falls below <55 mg/dL, please obtain the following prior to treatment: C-peptide, insulin, proinsulin, BMP, BHB.   - Limit dextrose IVP if able to reduce reactive hypoglycemia.  - Patient can follow up at discharge with Dr Joseph on 7/22/2024 At 10AM at [facility]. Type 2 diabetes mellitus with hyperglycemia  - Start Lantus 15 units at bedtime.  - Start lispro 5 untis before meals.  - Start lispro MISS before meals and at bedtime.  - Limit dextrose IVP if able to reduce reactive hypoglycemia.  - Patient can follow up at discharge with Dr Joseph on 7/22/2024 At 10AM at [facility].

## 2024-04-17 LAB
ALBUMIN SERPL ELPH-MCNC: 3.5 G/DL — SIGNIFICANT CHANGE UP (ref 3.3–5)
ALBUMIN SERPL ELPH-MCNC: 3.8 G/DL — SIGNIFICANT CHANGE UP (ref 3.3–5)
ALP SERPL-CCNC: 146 U/L — HIGH (ref 40–120)
ALP SERPL-CCNC: 153 U/L — HIGH (ref 40–120)
ALT FLD-CCNC: 6 U/L — LOW (ref 10–45)
ALT FLD-CCNC: 9 U/L — LOW (ref 10–45)
ANION GAP SERPL CALC-SCNC: 11 MMOL/L — SIGNIFICANT CHANGE UP (ref 5–17)
ANION GAP SERPL CALC-SCNC: 12 MMOL/L — SIGNIFICANT CHANGE UP (ref 5–17)
ANISOCYTOSIS BLD QL: SIGNIFICANT CHANGE UP
AST SERPL-CCNC: 10 U/L — SIGNIFICANT CHANGE UP (ref 10–40)
AST SERPL-CCNC: 25 U/L — SIGNIFICANT CHANGE UP (ref 10–40)
B-OH-BUTYR SERPL-SCNC: 0.1 MMOL/L — SIGNIFICANT CHANGE UP
BASOPHILS # BLD AUTO: 0 K/UL — SIGNIFICANT CHANGE UP (ref 0–0.2)
BASOPHILS NFR BLD AUTO: 0 % — SIGNIFICANT CHANGE UP (ref 0–2)
BILIRUB SERPL-MCNC: 0.2 MG/DL — SIGNIFICANT CHANGE UP (ref 0.2–1.2)
BILIRUB SERPL-MCNC: 0.2 MG/DL — SIGNIFICANT CHANGE UP (ref 0.2–1.2)
BUN SERPL-MCNC: 10 MG/DL — SIGNIFICANT CHANGE UP (ref 7–23)
BUN SERPL-MCNC: 11 MG/DL — SIGNIFICANT CHANGE UP (ref 7–23)
CALCIUM SERPL-MCNC: 9 MG/DL — SIGNIFICANT CHANGE UP (ref 8.4–10.5)
CALCIUM SERPL-MCNC: 9.3 MG/DL — SIGNIFICANT CHANGE UP (ref 8.4–10.5)
CHLORIDE SERPL-SCNC: 102 MMOL/L — SIGNIFICANT CHANGE UP (ref 96–108)
CHLORIDE SERPL-SCNC: 102 MMOL/L — SIGNIFICANT CHANGE UP (ref 96–108)
CO2 SERPL-SCNC: 22 MMOL/L — SIGNIFICANT CHANGE UP (ref 22–31)
CO2 SERPL-SCNC: 24 MMOL/L — SIGNIFICANT CHANGE UP (ref 22–31)
CREAT SERPL-MCNC: 0.78 MG/DL — SIGNIFICANT CHANGE UP (ref 0.5–1.3)
CREAT SERPL-MCNC: 0.9 MG/DL — SIGNIFICANT CHANGE UP (ref 0.5–1.3)
DACRYOCYTES BLD QL SMEAR: SLIGHT — SIGNIFICANT CHANGE UP
EGFR: 81 ML/MIN/1.73M2 — SIGNIFICANT CHANGE UP
EGFR: 96 ML/MIN/1.73M2 — SIGNIFICANT CHANGE UP
EOSINOPHIL # BLD AUTO: 0.22 K/UL — SIGNIFICANT CHANGE UP (ref 0–0.5)
EOSINOPHIL NFR BLD AUTO: 1.7 % — SIGNIFICANT CHANGE UP (ref 0–6)
GAD65 AB SER-MCNC: 0 NMOL/L — SIGNIFICANT CHANGE UP
GLUCOSE BLDC GLUCOMTR-MCNC: 117 MG/DL — HIGH (ref 70–99)
GLUCOSE BLDC GLUCOMTR-MCNC: 145 MG/DL — HIGH (ref 70–99)
GLUCOSE BLDC GLUCOMTR-MCNC: 192 MG/DL — HIGH (ref 70–99)
GLUCOSE BLDC GLUCOMTR-MCNC: 46 MG/DL — CRITICAL LOW (ref 70–99)
GLUCOSE BLDC GLUCOMTR-MCNC: 63 MG/DL — LOW (ref 70–99)
GLUCOSE BLDC GLUCOMTR-MCNC: 68 MG/DL — LOW (ref 70–99)
GLUCOSE BLDC GLUCOMTR-MCNC: 70 MG/DL — SIGNIFICANT CHANGE UP (ref 70–99)
GLUCOSE BLDC GLUCOMTR-MCNC: 74 MG/DL — SIGNIFICANT CHANGE UP (ref 70–99)
GLUCOSE SERPL-MCNC: 48 MG/DL — CRITICAL LOW (ref 70–99)
GLUCOSE SERPL-MCNC: 66 MG/DL — LOW (ref 70–99)
HCT VFR BLD CALC: 35.8 % — SIGNIFICANT CHANGE UP (ref 34.5–45)
HGB BLD-MCNC: 9.9 G/DL — LOW (ref 11.5–15.5)
HYPOCHROMIA BLD QL: SIGNIFICANT CHANGE UP
LYMPHOCYTES # BLD AUTO: 1.49 K/UL — SIGNIFICANT CHANGE UP (ref 1–3.3)
LYMPHOCYTES # BLD AUTO: 11.3 % — LOW (ref 13–44)
MACROCYTES BLD QL: SLIGHT — SIGNIFICANT CHANGE UP
MAGNESIUM SERPL-MCNC: 1.8 MG/DL — SIGNIFICANT CHANGE UP (ref 1.6–2.6)
MANUAL SMEAR VERIFICATION: SIGNIFICANT CHANGE UP
MCHC RBC-ENTMCNC: 19.8 PG — LOW (ref 27–34)
MCHC RBC-ENTMCNC: 27.7 GM/DL — LOW (ref 32–36)
MCV RBC AUTO: 71.7 FL — LOW (ref 80–100)
MICROCYTES BLD QL: SIGNIFICANT CHANGE UP
MONOCYTES # BLD AUTO: 0.58 K/UL — SIGNIFICANT CHANGE UP (ref 0–0.9)
MONOCYTES NFR BLD AUTO: 4.4 % — SIGNIFICANT CHANGE UP (ref 2–14)
NEUTROPHILS # BLD AUTO: 10.89 K/UL — HIGH (ref 1.8–7.4)
NEUTROPHILS NFR BLD AUTO: 81.7 % — HIGH (ref 43–77)
NEUTS BAND # BLD: 0.9 % — SIGNIFICANT CHANGE UP (ref 0–8)
OVALOCYTES BLD QL SMEAR: SIGNIFICANT CHANGE UP
PHOSPHATE SERPL-MCNC: 3 MG/DL — SIGNIFICANT CHANGE UP (ref 2.5–4.5)
PLAT MORPH BLD: NORMAL — SIGNIFICANT CHANGE UP
PLATELET # BLD AUTO: 451 K/UL — HIGH (ref 150–400)
POIKILOCYTOSIS BLD QL AUTO: SIGNIFICANT CHANGE UP
POLYCHROMASIA BLD QL SMEAR: SIGNIFICANT CHANGE UP
POTASSIUM SERPL-MCNC: 3.8 MMOL/L — SIGNIFICANT CHANGE UP (ref 3.5–5.3)
POTASSIUM SERPL-MCNC: 4.7 MMOL/L — SIGNIFICANT CHANGE UP (ref 3.5–5.3)
POTASSIUM SERPL-SCNC: 3.8 MMOL/L — SIGNIFICANT CHANGE UP (ref 3.5–5.3)
POTASSIUM SERPL-SCNC: 4.7 MMOL/L — SIGNIFICANT CHANGE UP (ref 3.5–5.3)
PROT SERPL-MCNC: 7 G/DL — SIGNIFICANT CHANGE UP (ref 6–8.3)
PROT SERPL-MCNC: 8 G/DL — SIGNIFICANT CHANGE UP (ref 6–8.3)
RBC # BLD: 4.99 M/UL — SIGNIFICANT CHANGE UP (ref 3.8–5.2)
RBC # FLD: 18.2 % — HIGH (ref 10.3–14.5)
RBC BLD AUTO: ABNORMAL
SCHISTOCYTES BLD QL AUTO: SLIGHT — SIGNIFICANT CHANGE UP
SODIUM SERPL-SCNC: 136 MMOL/L — SIGNIFICANT CHANGE UP (ref 135–145)
SODIUM SERPL-SCNC: 137 MMOL/L — SIGNIFICANT CHANGE UP (ref 135–145)
SPHEROCYTES BLD QL SMEAR: SLIGHT — SIGNIFICANT CHANGE UP
WBC # BLD: 13.18 K/UL — HIGH (ref 3.8–10.5)
WBC # FLD AUTO: 13.18 K/UL — HIGH (ref 3.8–10.5)

## 2024-04-17 PROCEDURE — 99232 SBSQ HOSP IP/OBS MODERATE 35: CPT

## 2024-04-17 PROCEDURE — 99233 SBSQ HOSP IP/OBS HIGH 50: CPT | Mod: GC

## 2024-04-17 RX ORDER — SODIUM CHLORIDE 9 MG/ML
1000 INJECTION, SOLUTION INTRAVENOUS
Refills: 0 | Status: DISCONTINUED | OUTPATIENT
Start: 2024-04-17 | End: 2024-04-17

## 2024-04-17 RX ORDER — OXYCODONE HYDROCHLORIDE 5 MG/1
5 TABLET ORAL ONCE
Refills: 0 | Status: DISCONTINUED | OUTPATIENT
Start: 2024-04-17 | End: 2024-04-17

## 2024-04-17 RX ORDER — OXYCODONE HYDROCHLORIDE 5 MG/1
5 TABLET ORAL EVERY 6 HOURS
Refills: 0 | Status: DISCONTINUED | OUTPATIENT
Start: 2024-04-17 | End: 2024-04-18

## 2024-04-17 RX ORDER — SODIUM CHLORIDE 9 MG/ML
1000 INJECTION, SOLUTION INTRAVENOUS
Refills: 0 | Status: DISCONTINUED | OUTPATIENT
Start: 2024-04-17 | End: 2024-04-18

## 2024-04-17 RX ORDER — OXYCODONE HYDROCHLORIDE 5 MG/1
2.5 TABLET ORAL EVERY 6 HOURS
Refills: 0 | Status: DISCONTINUED | OUTPATIENT
Start: 2024-04-17 | End: 2024-04-17

## 2024-04-17 RX ADMIN — Medication 1 APPLICATORFUL: at 21:57

## 2024-04-17 RX ADMIN — FAMOTIDINE 20 MILLIGRAM(S): 10 INJECTION INTRAVENOUS at 06:07

## 2024-04-17 RX ADMIN — OXYCODONE HYDROCHLORIDE 2.5 MILLIGRAM(S): 5 TABLET ORAL at 13:02

## 2024-04-17 RX ADMIN — Medication 2 MILLIGRAM(S): at 06:07

## 2024-04-17 RX ADMIN — OXYCODONE HYDROCHLORIDE 5 MILLIGRAM(S): 5 TABLET ORAL at 19:03

## 2024-04-17 RX ADMIN — Medication 650 MILLIGRAM(S): at 22:58

## 2024-04-17 RX ADMIN — FAMOTIDINE 20 MILLIGRAM(S): 10 INJECTION INTRAVENOUS at 17:08

## 2024-04-17 RX ADMIN — GABAPENTIN 600 MILLIGRAM(S): 400 CAPSULE ORAL at 21:58

## 2024-04-17 RX ADMIN — SODIUM CHLORIDE 50 MILLILITER(S): 9 INJECTION, SOLUTION INTRAVENOUS at 18:44

## 2024-04-17 RX ADMIN — OXYCODONE HYDROCHLORIDE 5 MILLIGRAM(S): 5 TABLET ORAL at 03:31

## 2024-04-17 RX ADMIN — ATORVASTATIN CALCIUM 80 MILLIGRAM(S): 80 TABLET, FILM COATED ORAL at 21:58

## 2024-04-17 RX ADMIN — Medication 650 MILLIGRAM(S): at 21:58

## 2024-04-17 RX ADMIN — APIXABAN 5 MILLIGRAM(S): 2.5 TABLET, FILM COATED ORAL at 17:08

## 2024-04-17 RX ADMIN — GABAPENTIN 600 MILLIGRAM(S): 400 CAPSULE ORAL at 13:02

## 2024-04-17 RX ADMIN — SODIUM CHLORIDE 100 MILLILITER(S): 9 INJECTION, SOLUTION INTRAVENOUS at 14:42

## 2024-04-17 RX ADMIN — Medication 2 MILLIGRAM(S): at 12:08

## 2024-04-17 RX ADMIN — Medication 650 MILLIGRAM(S): at 14:52

## 2024-04-17 RX ADMIN — Medication 5 MILLIGRAM(S): at 21:58

## 2024-04-17 RX ADMIN — NORTRIPTYLINE HYDROCHLORIDE 10 MILLIGRAM(S): 10 CAPSULE ORAL at 21:57

## 2024-04-17 RX ADMIN — Medication 2 MILLIGRAM(S): at 19:03

## 2024-04-17 RX ADMIN — CLOPIDOGREL BISULFATE 75 MILLIGRAM(S): 75 TABLET, FILM COATED ORAL at 12:08

## 2024-04-17 RX ADMIN — OXYCODONE HYDROCHLORIDE 2.5 MILLIGRAM(S): 5 TABLET ORAL at 14:02

## 2024-04-17 RX ADMIN — APIXABAN 5 MILLIGRAM(S): 2.5 TABLET, FILM COATED ORAL at 06:06

## 2024-04-17 RX ADMIN — GABAPENTIN 600 MILLIGRAM(S): 400 CAPSULE ORAL at 06:07

## 2024-04-17 RX ADMIN — OXYCODONE HYDROCHLORIDE 5 MILLIGRAM(S): 5 TABLET ORAL at 02:31

## 2024-04-17 NOTE — PROGRESS NOTE ADULT - ATTENDING COMMENTS
Pt is 45 yo woman with obesity, abdominal hernia pending repair, colostomy, CAD with multiple stents, recent admission for hypoglycemia, recent change in insulin regimen, as per dexcom improved glycemic control over the past 3 month, admitted for 'not feeling well'. Pt was found to be hypoglycemic on this admission and started on D5 drip.  -----Hypoglycemia resolved with improved PO intake and cessation of insulin therapy. However yesterday, after being given dc notice pt developed hypoglycemia in early morning hours. Calling security to check personal belongings for presence of insulin. If none found, pt will be observed closely with over the clock FS monitoring and C-peptide and Insulin and Cortisol leves re-checked. Will ask Endocrinology team if any additional tests for insulinoma are indicated.   -----pt claims that she does not eat. Will start with calori count  -----Chronic back, leg, abdominal pain. Will offer low dose oxycodone while in hospital  -----Colostomy care as per protocol.

## 2024-04-17 NOTE — DIETITIAN INITIAL EVALUATION ADULT - EDUCATION DIETARY MODIFICATIONS
Discussed importance of adequate intake in setting of hypoglycemia episodes and strategies to promote more consistent intake. Educated on foods likely to be well tolerated and encouraged use of oral nutrition supplements between meals. Pt aware RD remains available for additional questions/concerns./(2) meets goals/outcomes/verbalization

## 2024-04-17 NOTE — PROVIDER CONTACT NOTE (HYPOGLYCEMIA EVENT) - NS PROVIDER CONTACT BACKGROUND-HYPO
Age: 44y    Gender: Female    POCT Blood Glucose:  63 mg/dL (04-17-24 @ 11:07)  117 mg/dL (04-17-24 @ 09:21)  68 mg/dL (04-17-24 @ 08:06)  46 mg/dL (04-17-24 @ 07:55)  74 mg/dL (04-17-24 @ 04:29)  205 mg/dL (04-16-24 @ 22:11)  186 mg/dL (04-16-24 @ 17:01)  181 mg/dL (04-16-24 @ 12:18)      eMAR:atorvastatin   80 milliGRAM(s) Oral (04-16-24 @ 22:13)    insulin glargine Injectable (LANTUS)   15 Unit(s) SubCutaneous (04-16-24 @ 22:14)

## 2024-04-17 NOTE — PROGRESS NOTE ADULT - SUBJECTIVE AND OBJECTIVE BOX
******INCOMPLETE******    Patient is a 44y old  Female who presents with a chief complaint of   OVERNIGHT EVENTS/INTERVAL HPI:    REVIEW OF SYSTEMS:  All other review of systems is negative unless indicated above.    OBJECTIVE:  T(C): 36.4 (04-17-24 @ 05:39), Max: 36.9 (04-16-24 @ 12:27)  HR: 99 (04-17-24 @ 05:39) (91 - 99)  BP: 112/66 (04-17-24 @ 05:39) (112/66 - 131/83)  RR: 17 (04-17-24 @ 05:39) (17 - 18)  SpO2: 98% (04-17-24 @ 05:39) (98% - 99%)  Daily     Daily     Physical Exam:  General: in no acute distress  Eyes: EOMI intact bilaterally. Anicteric sclerae, moist conjunctivae  HENT: Moist mucous membranes  Neck: Trachea midline, supple  Lungs: CTA B/L. No wheezes, rales, or rhonchi  Cardiovascular: RRR. No murmurs, rubs, or gallops  Abdomen: Soft, non-tender non-distended; No rebound or guarding  Extremities: WWP, No clubbing, cyanosis or edema  MSK: No midline bony tenderness. No CVA tenderness bilaterally  Neurological: Alert and oriented x3  Skin: Warm and dry. No obvious rash     Medications:  MEDICATIONS  (STANDING):  acetaminophen     Tablet .. 650 milliGRAM(s) Oral every 6 hours  apixaban 5 milliGRAM(s) Oral every 12 hours  atorvastatin 80 milliGRAM(s) Oral at bedtime  clopidogrel Tablet 75 milliGRAM(s) Oral daily  clotrimazole 1% Vaginal Cream 1 Applicatorful Vaginal at bedtime  dextrose 10% Bolus 125 milliLiter(s) IV Bolus once  dextrose 5%. 1000 milliLiter(s) (50 mL/Hr) IV Continuous <Continuous>  dextrose 5%. 1000 milliLiter(s) (100 mL/Hr) IV Continuous <Continuous>  dextrose 50% Injectable 12.5 Gram(s) IV Push once  dextrose 50% Injectable 25 Gram(s) IV Push once  famotidine    Tablet 20 milliGRAM(s) Oral every 12 hours  gabapentin 600 milliGRAM(s) Oral three times a day  glucagon  Injectable 1 milliGRAM(s) IntraMuscular once  influenza   Vaccine 0.5 milliLiter(s) IntraMuscular once  insulin glargine Injectable (LANTUS) 15 Unit(s) SubCutaneous at bedtime  insulin lispro Injectable (ADMELOG) 5 Unit(s) SubCutaneous three times a day before meals  lidocaine   4% Patch 1 Patch Transdermal once  melatonin 5 milliGRAM(s) Oral at bedtime  nortriptyline 10 milliGRAM(s) Oral at bedtime    MEDICATIONS  (PRN):  dextrose Oral Gel 15 Gram(s) Oral once PRN Blood Glucose LESS THAN 70 milliGRAM(s)/deciliter  loperamide 2 milliGRAM(s) Oral every 4 hours PRN increased ostomy output  trimethobenzamide Injectable 200 milliGRAM(s) IntraMuscular every 8 hours PRN Nausea and/or Vomiting      Labs:                        9.9    13.18 )-----------( 451      ( 17 Apr 2024 06:35 )             35.8     04-17    136  |  102  |  11  ----------------------------<  66<L>  3.8   |  22  |  0.90    Ca    9.0      17 Apr 2024 06:35  Phos  3.0     04-17  Mg     1.8     04-17    TPro  7.0  /  Alb  3.5  /  TBili  0.2  /  DBili  x   /  AST  10  /  ALT  6<L>  /  AlkPhos  146<H>  04-17      Urinalysis Basic - ( 17 Apr 2024 06:35 )    Color: x / Appearance: x / SG: x / pH: x  Gluc: 66 mg/dL / Ketone: x  / Bili: x / Urobili: x   Blood: x / Protein: x / Nitrite: x   Leuk Esterase: x / RBC: x / WBC x   Sq Epi: x / Non Sq Epi: x / Bacteria: x      SARS-CoV-2: NotDetec (31 Dec 2023 12:46)      Radiology: Reviewed OVERNIGHT EVENTS/INTERVAL HPI: o/n rubén. This morning, pt hypoglycemic, reports feeling jittery. Endorses headache, back pain.     REVIEW OF SYSTEMS:  All other review of systems is negative unless indicated above.    OBJECTIVE:  T(C): 36.4 (04-17-24 @ 05:39), Max: 36.9 (04-16-24 @ 12:27)  HR: 99 (04-17-24 @ 05:39) (91 - 99)  BP: 112/66 (04-17-24 @ 05:39) (112/66 - 131/83)  RR: 17 (04-17-24 @ 05:39) (17 - 18)  SpO2: 98% (04-17-24 @ 05:39) (98% - 99%)  Daily     Daily     Physical Exam:  General: in no acute distress  Eyes: EOMI intact bilaterally.   HENT: Moist mucous membranes  Lungs: CTA B/L.   Cardiovascular: RRR. No murmurs, rubs, or gallops  Abdomen: Soft, non-tender non-distended  Extremities: WWP, No clubbing, cyanosis or edema  MSK: equal  strength b/l  Neurological: Alert and oriented x3  Skin: Warm and dry. No obvious rash     Medications:  MEDICATIONS  (STANDING):  acetaminophen     Tablet .. 650 milliGRAM(s) Oral every 6 hours  apixaban 5 milliGRAM(s) Oral every 12 hours  atorvastatin 80 milliGRAM(s) Oral at bedtime  clopidogrel Tablet 75 milliGRAM(s) Oral daily  clotrimazole 1% Vaginal Cream 1 Applicatorful Vaginal at bedtime  dextrose 10% Bolus 125 milliLiter(s) IV Bolus once  dextrose 5%. 1000 milliLiter(s) (50 mL/Hr) IV Continuous <Continuous>  dextrose 5%. 1000 milliLiter(s) (100 mL/Hr) IV Continuous <Continuous>  dextrose 50% Injectable 12.5 Gram(s) IV Push once  dextrose 50% Injectable 25 Gram(s) IV Push once  famotidine    Tablet 20 milliGRAM(s) Oral every 12 hours  gabapentin 600 milliGRAM(s) Oral three times a day  glucagon  Injectable 1 milliGRAM(s) IntraMuscular once  influenza   Vaccine 0.5 milliLiter(s) IntraMuscular once  insulin glargine Injectable (LANTUS) 15 Unit(s) SubCutaneous at bedtime  insulin lispro Injectable (ADMELOG) 5 Unit(s) SubCutaneous three times a day before meals  lidocaine   4% Patch 1 Patch Transdermal once  melatonin 5 milliGRAM(s) Oral at bedtime  nortriptyline 10 milliGRAM(s) Oral at bedtime    MEDICATIONS  (PRN):  dextrose Oral Gel 15 Gram(s) Oral once PRN Blood Glucose LESS THAN 70 milliGRAM(s)/deciliter  loperamide 2 milliGRAM(s) Oral every 4 hours PRN increased ostomy output  trimethobenzamide Injectable 200 milliGRAM(s) IntraMuscular every 8 hours PRN Nausea and/or Vomiting      Labs:                        9.9    13.18 )-----------( 451      ( 17 Apr 2024 06:35 )             35.8     04-17    136  |  102  |  11  ----------------------------<  66<L>  3.8   |  22  |  0.90    Ca    9.0      17 Apr 2024 06:35  Phos  3.0     04-17  Mg     1.8     04-17    TPro  7.0  /  Alb  3.5  /  TBili  0.2  /  DBili  x   /  AST  10  /  ALT  6<L>  /  AlkPhos  146<H>  04-17      Urinalysis Basic - ( 17 Apr 2024 06:35 )    Color: x / Appearance: x / SG: x / pH: x  Gluc: 66 mg/dL / Ketone: x  / Bili: x / Urobili: x   Blood: x / Protein: x / Nitrite: x   Leuk Esterase: x / RBC: x / WBC x   Sq Epi: x / Non Sq Epi: x / Bacteria: x      SARS-CoV-2: NotDetec (31 Dec 2023 12:46)      Radiology: Reviewed OVERNIGHT EVENTS/INTERVAL HPI: o/n rubén. This morning, pt hypoglycemic, reports feeling jittery. Endorses headache, back pain.     REVIEW OF SYSTEMS:  All other review of systems is negative unless indicated above.    OBJECTIVE:  T(C): 36.4 (04-17-24 @ 05:39), Max: 36.9 (04-16-24 @ 12:27)  HR: 99 (04-17-24 @ 05:39) (91 - 99)  BP: 112/66 (04-17-24 @ 05:39) (112/66 - 131/83)  RR: 17 (04-17-24 @ 05:39) (17 - 18)  SpO2: 98% (04-17-24 @ 05:39) (98% - 99%)  Daily     Daily     Physical Exam:  General: in no acute distress  Eyes: EOMI intact bilaterally.   HENT: Moist mucous membranes  Lungs: CTA B/L.   Cardiovascular: RRR. No murmurs, rubs, or gallops  Abdomen: Soft, non-tender non-distended. ostomy bag with no surrounding erythema and w/o increased output  Extremities: WWP, No clubbing, cyanosis or edema  MSK: equal  strength b/l  Neurological: Alert and oriented x3  Skin: Warm and dry. No obvious rash     Medications:  MEDICATIONS  (STANDING):  acetaminophen     Tablet .. 650 milliGRAM(s) Oral every 6 hours  apixaban 5 milliGRAM(s) Oral every 12 hours  atorvastatin 80 milliGRAM(s) Oral at bedtime  clopidogrel Tablet 75 milliGRAM(s) Oral daily  clotrimazole 1% Vaginal Cream 1 Applicatorful Vaginal at bedtime  dextrose 10% Bolus 125 milliLiter(s) IV Bolus once  dextrose 5%. 1000 milliLiter(s) (50 mL/Hr) IV Continuous <Continuous>  dextrose 5%. 1000 milliLiter(s) (100 mL/Hr) IV Continuous <Continuous>  dextrose 50% Injectable 12.5 Gram(s) IV Push once  dextrose 50% Injectable 25 Gram(s) IV Push once  famotidine    Tablet 20 milliGRAM(s) Oral every 12 hours  gabapentin 600 milliGRAM(s) Oral three times a day  glucagon  Injectable 1 milliGRAM(s) IntraMuscular once  influenza   Vaccine 0.5 milliLiter(s) IntraMuscular once  insulin glargine Injectable (LANTUS) 15 Unit(s) SubCutaneous at bedtime  insulin lispro Injectable (ADMELOG) 5 Unit(s) SubCutaneous three times a day before meals  lidocaine   4% Patch 1 Patch Transdermal once  melatonin 5 milliGRAM(s) Oral at bedtime  nortriptyline 10 milliGRAM(s) Oral at bedtime    MEDICATIONS  (PRN):  dextrose Oral Gel 15 Gram(s) Oral once PRN Blood Glucose LESS THAN 70 milliGRAM(s)/deciliter  loperamide 2 milliGRAM(s) Oral every 4 hours PRN increased ostomy output  trimethobenzamide Injectable 200 milliGRAM(s) IntraMuscular every 8 hours PRN Nausea and/or Vomiting      Labs:                        9.9    13.18 )-----------( 451      ( 17 Apr 2024 06:35 )             35.8     04-17    136  |  102  |  11  ----------------------------<  66<L>  3.8   |  22  |  0.90    Ca    9.0      17 Apr 2024 06:35  Phos  3.0     04-17  Mg     1.8     04-17    TPro  7.0  /  Alb  3.5  /  TBili  0.2  /  DBili  x   /  AST  10  /  ALT  6<L>  /  AlkPhos  146<H>  04-17      Urinalysis Basic - ( 17 Apr 2024 06:35 )    Color: x / Appearance: x / SG: x / pH: x  Gluc: 66 mg/dL / Ketone: x  / Bili: x / Urobili: x   Blood: x / Protein: x / Nitrite: x   Leuk Esterase: x / RBC: x / WBC x   Sq Epi: x / Non Sq Epi: x / Bacteria: x      SARS-CoV-2: NotDete (31 Dec 2023 12:46)      Radiology: Reviewed

## 2024-04-17 NOTE — PROGRESS NOTE ADULT - ASSESSMENT
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  In ED, patient was tachycardic but VS otherwise stable. Her BG was initially 46 and she had a mild WBC and elevated lactate. She improved on a D5 drip and was trialed off, however BG started downtrending again and was restarted. Suspect current episode triggered by restarting ozempic at increased dose.   Of note, patient is aggressively trying to achieve A1C <8% in order to have hernia repaired.    Maintaining glucose off dextrose IVF. Eating somewhat.  Likely hyperinsulinemic. Avoid overtreating lows with dextrose IVP or large quantities of juice whenever possible to avoid stimulating excess endogenous insulin production.   AM Cortisol level was 4, 2 days after receiving solumedrol, likely still some suppression. No other s/s of adrenal insufficiency. TFTs normal. Repeat AM cortisol was 15.3. R/O adrenal insufficiency as contributing factor to hypoglycemia.    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  In ED, patient was tachycardic but VS otherwise stable. Her BG was initially 46 and she had a mild WBC and elevated lactate. She improved on a D5 drip and was trialed off, however BG started downtrending again and was restarted. Suspect current episode triggered by restarting ozempic at increased dose.   Of note, patient is aggressively trying to achieve A1C <8% in order to have hernia repaired.    Fasting hypoglycemic on small dose of lantus. Will await hypoglycemic labs to result.  Likely hyperinsulinemic. Avoid overtreating lows with dextrose IVP or large quantities of juice whenever possible to avoid stimulating excess endogenous insulin production.   AM Cortisol level was 4, 2 days after receiving solumedrol, likely still some suppression. No other s/s of adrenal insufficiency. TFTs normal. Repeat AM cortisol was 15.3. R/O adrenal insufficiency as contributing factor to hypoglycemia.    A1C: 8.8% (March 2024),  10.8 % (Feb 2024), 16% (Jan 1, 2024)  C-peptide 9.7, Insulin abs negative, free insulin 49 (high, 0-17) and total insulin 50 with  (Feb 2024)  BUN: 12  Creatinine: 0.89  GFR: 82  Weight: 111.6  BMI: 38.5

## 2024-04-17 NOTE — PROGRESS NOTE ADULT - PROBLEM SELECTOR PLAN 6
D: consistent carb w/ evening snack  E: replete as necessary  DVT ppx: Eliquis  Dispo: [facility]  Code: FULL

## 2024-04-17 NOTE — DIETITIAN INITIAL EVALUATION ADULT - OTHER INFO
43F PMH with prior CVA in 2021 (some mild residual left sided weakness), PE in 2023 (on Eliquis), multiple abdominal hernia repairs, nec fasc of abdomen in 2021 status post corbin procedure with diverting ostomy, IDDM2 with complications (likely gastroparesis), CAD with multiple stents (last in June 2023), presented with nausea and vomiting x4 days, increased ostomy output, lower abdominal pain and symptomatic hypoglycemia, admitted for management of hypoglycemia.       Pt seen on 7UR for assessment. Labs and medication orders reviewed. Ordered for D5% + lactated ringers, 15U lantus, 5U premeal admelog. Electrolytes WNL, POC blood glucose (4/16-4/17) , HgbA1c (4/12) 8.2%. On Consistent Carbohydrate diet with Ensure Max x4/day. Pt discussed in interdisciplinary team rounds, pt with repeated episodes of hypoglycemia. Pt reports poor appetite and intake in setting of nausea (last episode emesis x4-5d ago) and high ostomy output (reports changing bag ~x8/day). Diet recall includes fruits, fruit juice, and some candy. Reports UBW ~243lb consistent with admission wt 246lb/111.6kg, denies wt loss PTA. Confirms fish and shellfish allergies - documented in medical record. Pt denies difficulty chewing/swallowing. No Samar[place]/ethnic/cultural food preferences noted. No pressure injuries documented, 1+ bilateral leg edema noted, Joseph score 17. Discussed oral nutrition supplements, pt reports dislike for Ensure Max, amenable to Glucerna. See nutrition recommendations - RD made multiple attempts to contact team without response. RD to remain available.

## 2024-04-17 NOTE — PROGRESS NOTE ADULT - PROBLEM SELECTOR PLAN 1
Type 2 diabetes mellitus with other specified complication, with long-term current use of insulin.   ·  Plan: Type 2 diabetes mellitus with hyperglycemia  - Stop standing insulin.  - Decrease D5 to 50ml/hr at 10PM and discontinue tomorrow morning.  - Limit dextrose IVP/overtreatment with juice for hypoglycemia if able to reduce reactive hypoglycemia.  - Monitor FSG before meals and at bedtime.  - Limit dextrose IVP if able to reduce reactive hypoglycemia.  - F/U hypoglycemia w/u collected today when serum BG 48: please obtain the following prior to treatment: C-peptide, insulin, proinsulin, BMP, BHB.   - Patient can follow up at discharge with Dr Joseph on 7/22/2024 At 10AM at [facility].

## 2024-04-17 NOTE — DIETITIAN INITIAL EVALUATION ADULT - NS FNS DIET ORDER
Diet, Consistent Carbohydrate w/Evening Snack:   Supplement Feeding Modality:  Oral  Ensure Max Cans or Servings Per Day:  2       Frequency:  Two Times a day (04-11-24 @ 17:39) [Active]

## 2024-04-17 NOTE — PROGRESS NOTE ADULT - PROBLEM SELECTOR PLAN 2
Most recent POCT A1c 8%, likely inaccurate given her labile sugars and frequently low sugars. C peptide 9.7 in May of 2023. Patient likely with gastroparesis fair leading to nausea and vomiting and poor PO and likely hypoglycemia from poor PO and endogenous insulin production. Home medication for gastroparesis: Gabapentin 600mg PO TID and Nortriptyline 10mg PO qhs. Home medications: Ozempic 1g qweek (last injection 4/2), Lantus 40u qhs, Lispro 15u TID with meals. Several episodes of BG <60 since admission, no BG levels >200  - endo recs Lantus 15, lispo 5     - A1C 8.2 (down from 10.8 in 02/2024); c-peptide 3.1 (down from 9.2 02/2024)  - d/c Ozempic inpatient  - c/w home meds for gastroparesis

## 2024-04-17 NOTE — PROGRESS NOTE ADULT - SUBJECTIVE AND OBJECTIVE BOX
SUBJECTIVE / INTERVAL HPI: Patient was seen and examined this morning. Reports abdominal pain and watery output from ostomy. Ate breakfast yesterday morning, but nothing the rest of the day. Received Lantus 15 units and became hypoglycemic this AM. Hypoglycemia workup collected and dextrose IVF started at 100ml/hr. For breakfast she had banana, orange, eggs and potatoes, 3 cups of juice.    CAPILLARY BLOOD GLUCOSE & INSULIN RECEIVED  152 mg/dL (04-16 @ 07:00)  155 mg/dL (04-16 @ 09:22)  181 mg/dL (04-16 @ 12:18)  186 mg/dL (04-16 @ 17:01)  205 mg/dL (04-16 @ 22:11)  74 mg/dL (04-17 @ 04:29)  66 mg/dL (04-17 @ 06:35)  46 mg/dL (04-17 @ 07:55)  68 mg/dL (04-17 @ 08:06)  117 mg/dL (04-17 @ 09:21)  63 mg/dL (04-17 @ 11:07)  48 mg/dL (04-17 @ 11:29)  70 mg/dL (04-17 @ 12:32)  145 mg/dL (04-17 @ 17:43)      REVIEW OF SYSTEMS  Constitutional:  Negative fever, chills or loss of appetite.  Eyes:  Negative blurry vision or double vision.  Cardiovascular:  Negative for chest pain or palpitations.  Respiratory:  Negative for cough, wheezing, or shortness of breath.    Gastrointestinal:  Negative for nausea, vomiting, diarrhea, constipation, or abdominal pain.  Genitourinary:  Negative frequency, urgency or dysuria.  Neurologic:  No headache, confusion, dizziness, lightheadedness.    PHYSICAL EXAM  Vital Signs Last 24 Hrs  T(C): 36.5 (17 Apr 2024 12:20), Max: 36.5 (17 Apr 2024 12:20)  T(F): 97.7 (17 Apr 2024 12:20), Max: 97.7 (17 Apr 2024 12:20)  HR: 100 (17 Apr 2024 12:20) (93 - 100)  BP: 139/68 (17 Apr 2024 12:20) (112/66 - 139/68)  BP(mean): 81 (17 Apr 2024 05:39) (81 - 81)  RR: 16 (17 Apr 2024 12:20) (16 - 17)  SpO2: 98% (17 Apr 2024 12:20) (98% - 99%)    Parameters below as of 17 Apr 2024 12:20  Patient On (Oxygen Delivery Method): room air        Constitutional: Awake, alert, in no acute distress.   HEENT: Normocephalic, atraumatic, NAOMI.  Respiratory: Lungs clear to ausculation bilaterally.   Cardiovascular: regular rhythm, normal S1 and S2, no audible murmurs.   GI: soft, non-tender, non-distended, bowel sounds present. + ostomy  Extremities: No lower extremity edema.  Psychiatric: AAO x 3. Normal affect/mood.     LABS  CBC - WBC/HGB/HTC/PLT: 13.18/9.9/35.8/451 (04-17-24)  BMP - Na/K/Cl/Bicarb/BUN/Cr/Gluc/AG/eGFR: 137/4.7/102/24/10/0.78/48/11/96 (04-17-24)  Ca - 9.3 (04-17-24)  Phos - -- (04-17-24)  Mg - -- (04-17-24)  LFT - Alb/Tprot/Tbili/Dbili/AlkPhos/ALT/AST: 3.8/--/0.2/--/153/9/25 (04-17-24)    Thyroid Stimulating Hormone, Serum: 2.700 (04-15-24)  Total T4/Free T4: --/1.170 (04-15-24)    MEDICATIONS  MEDICATIONS  (STANDING):  acetaminophen     Tablet .. 650 milliGRAM(s) Oral every 6 hours  apixaban 5 milliGRAM(s) Oral every 12 hours  atorvastatin 80 milliGRAM(s) Oral at bedtime  clopidogrel Tablet 75 milliGRAM(s) Oral daily  clotrimazole 1% Vaginal Cream 1 Applicatorful Vaginal at bedtime  dextrose 10% Bolus 125 milliLiter(s) IV Bolus once  dextrose 5% + lactated ringers. 1000 milliLiter(s) (50 mL/Hr) IV Continuous <Continuous>  dextrose 5%. 1000 milliLiter(s) (50 mL/Hr) IV Continuous <Continuous>  dextrose 5%. 1000 milliLiter(s) (100 mL/Hr) IV Continuous <Continuous>  dextrose 50% Injectable 12.5 Gram(s) IV Push once  dextrose 50% Injectable 25 Gram(s) IV Push once  famotidine    Tablet 20 milliGRAM(s) Oral every 12 hours  gabapentin 600 milliGRAM(s) Oral three times a day  glucagon  Injectable 1 milliGRAM(s) IntraMuscular once  influenza   Vaccine 0.5 milliLiter(s) IntraMuscular once  lidocaine   4% Patch 1 Patch Transdermal once  melatonin 5 milliGRAM(s) Oral at bedtime  nortriptyline 10 milliGRAM(s) Oral at bedtime    MEDICATIONS  (PRN):  dextrose Oral Gel 15 Gram(s) Oral once PRN Blood Glucose LESS THAN 70 milliGRAM(s)/deciliter  loperamide 2 milliGRAM(s) Oral every 4 hours PRN increased ostomy output  oxyCODONE    IR 5 milliGRAM(s) Oral every 6 hours PRN Moderate Pain (4 - 6)  trimethobenzamide Injectable 200 milliGRAM(s) IntraMuscular every 8 hours PRN Nausea and/or Vomiting

## 2024-04-17 NOTE — PROGRESS NOTE ADULT - PROBLEM SELECTOR PLAN 1
Most recent POCT A1c 8%, likely inaccurate given her labile sugars and frequently low sugars. C peptide 9.7 in May of 2023. Patient likely with gastroparesis fair leading to nausea and vomiting and poor PO and likely hypoglycemia from poor PO and endogenous insulin production. Home medications: Ozempic 1g qweek (last injection 4/2), Lantus 40u qhs, Lispro 15u TID with meals. Several episodes of BG <60 since admission, no BG levels >200  Cortisol level low at 4 from 4/13; f/u repeat AM cortisol and ACTH on 4/16 --> cortisol wnl  - endo recs Lantus 15, lispo 5  - D5W LR   - Tigan 200mg IM q8h PRN for nausea  - f/u cpeptide, insulin

## 2024-04-17 NOTE — DIETITIAN INITIAL EVALUATION ADULT - ADD RECOMMEND
1. Recommend Regular diet with Glucerna Shake (220kcal, 10g protein) x2/day to promote intake in setting of hypoglycemia.   >>Encourage & monitor PO intake. Celina dietary preferences as able.   2. Monitor GI tolerance, weight trends, labs, & skin integrity.  3. Defer bowel and pain regimens to team.   4. RD to remain available for diet education/intervention prn.

## 2024-04-17 NOTE — DIETITIAN INITIAL EVALUATION ADULT - OTHER CALCULATIONS
Estimated needs based on IBW as dosing wt 246lb/11.6kg >120% IBW (182%). Needs adjusted for age, BMI, and clinical status.

## 2024-04-17 NOTE — DIETITIAN INITIAL EVALUATION ADULT - PERTINENT LABORATORY DATA
04-17    137  |  102  |  10  ----------------------------<  48<LL>  4.7   |  24  |  0.78    Ca    9.3      17 Apr 2024 11:29  Phos  3.0     04-17  Mg     1.8     04-17    TPro  8.0  /  Alb  3.8  /  TBili  0.2  /  DBili  x   /  AST  25  /  ALT  9<L>  /  AlkPhos  153<H>  04-17  POCT Blood Glucose.: 70 mg/dL (04-17-24 @ 12:32)  A1C with Estimated Average Glucose Result: 8.2 % (04-12-24 @ 12:00)  A1C with Estimated Average Glucose Result: 10.8 % (02-14-24 @ 07:54)  A1C with Estimated Average Glucose Result: 16.4 % (01-01-24 @ 04:36)

## 2024-04-18 ENCOUNTER — TRANSCRIPTION ENCOUNTER (OUTPATIENT)
Age: 44
End: 2024-04-18

## 2024-04-18 VITALS
SYSTOLIC BLOOD PRESSURE: 136 MMHG | HEART RATE: 90 BPM | OXYGEN SATURATION: 96 % | RESPIRATION RATE: 17 BRPM | TEMPERATURE: 98 F | DIASTOLIC BLOOD PRESSURE: 74 MMHG

## 2024-04-18 LAB
C PEPTIDE SERPL-MCNC: 0.6 NG/ML — LOW (ref 1.1–4.4)
GLUCOSE BLDC GLUCOMTR-MCNC: 119 MG/DL — HIGH (ref 70–99)
GLUCOSE BLDC GLUCOMTR-MCNC: 157 MG/DL — HIGH (ref 70–99)
GLUCOSE BLDC GLUCOMTR-MCNC: 196 MG/DL — HIGH (ref 70–99)
INSULIN SERPL-MCNC: 3.6 UU/ML — SIGNIFICANT CHANGE UP (ref 2.6–24.9)

## 2024-04-18 PROCEDURE — 83735 ASSAY OF MAGNESIUM: CPT

## 2024-04-18 PROCEDURE — 96375 TX/PRO/DX INJ NEW DRUG ADDON: CPT

## 2024-04-18 PROCEDURE — 99232 SBSQ HOSP IP/OBS MODERATE 35: CPT

## 2024-04-18 PROCEDURE — 96374 THER/PROPH/DIAG INJ IV PUSH: CPT

## 2024-04-18 PROCEDURE — 84681 ASSAY OF C-PEPTIDE: CPT

## 2024-04-18 PROCEDURE — 84100 ASSAY OF PHOSPHORUS: CPT

## 2024-04-18 PROCEDURE — 80053 COMPREHEN METABOLIC PANEL: CPT

## 2024-04-18 PROCEDURE — 85025 COMPLETE CBC W/AUTO DIFF WBC: CPT

## 2024-04-18 PROCEDURE — 36415 COLL VENOUS BLD VENIPUNCTURE: CPT

## 2024-04-18 PROCEDURE — 87507 IADNA-DNA/RNA PROBE TQ 12-25: CPT

## 2024-04-18 PROCEDURE — 99285 EMERGENCY DEPT VISIT HI MDM: CPT | Mod: 25

## 2024-04-18 PROCEDURE — 82010 KETONE BODYS QUAN: CPT

## 2024-04-18 PROCEDURE — 82533 TOTAL CORTISOL: CPT

## 2024-04-18 PROCEDURE — 84702 CHORIONIC GONADOTROPIN TEST: CPT

## 2024-04-18 PROCEDURE — 83036 HEMOGLOBIN GLYCOSYLATED A1C: CPT

## 2024-04-18 PROCEDURE — 86341 ISLET CELL ANTIBODY: CPT

## 2024-04-18 PROCEDURE — 84206 ASSAY OF PROINSULIN: CPT

## 2024-04-18 PROCEDURE — 83605 ASSAY OF LACTIC ACID: CPT

## 2024-04-18 PROCEDURE — 83525 ASSAY OF INSULIN: CPT

## 2024-04-18 PROCEDURE — 74177 CT ABD & PELVIS W/CONTRAST: CPT | Mod: MC

## 2024-04-18 PROCEDURE — 84443 ASSAY THYROID STIM HORMONE: CPT

## 2024-04-18 PROCEDURE — 81001 URINALYSIS AUTO W/SCOPE: CPT

## 2024-04-18 PROCEDURE — 99239 HOSP IP/OBS DSCHRG MGMT >30: CPT | Mod: GC

## 2024-04-18 PROCEDURE — 80048 BASIC METABOLIC PNL TOTAL CA: CPT

## 2024-04-18 PROCEDURE — 83690 ASSAY OF LIPASE: CPT

## 2024-04-18 PROCEDURE — 96376 TX/PRO/DX INJ SAME DRUG ADON: CPT

## 2024-04-18 PROCEDURE — 82962 GLUCOSE BLOOD TEST: CPT

## 2024-04-18 PROCEDURE — 82024 ASSAY OF ACTH: CPT

## 2024-04-18 PROCEDURE — 84439 ASSAY OF FREE THYROXINE: CPT

## 2024-04-18 RX ORDER — LOPERAMIDE HCL 2 MG
1 TABLET ORAL
Qty: 45 | Refills: 0
Start: 2024-04-18 | End: 2024-05-02

## 2024-04-18 RX ORDER — SITAGLIPTIN 50 MG/1
1 TABLET, FILM COATED ORAL
Qty: 30 | Refills: 0
Start: 2024-04-18 | End: 2024-05-17

## 2024-04-18 RX ORDER — OXYCODONE HYDROCHLORIDE 5 MG/1
1 TABLET ORAL
Qty: 40 | Refills: 0
Start: 2024-04-18 | End: 2024-04-27

## 2024-04-18 RX ADMIN — GABAPENTIN 600 MILLIGRAM(S): 400 CAPSULE ORAL at 13:24

## 2024-04-18 RX ADMIN — OXYCODONE HYDROCHLORIDE 5 MILLIGRAM(S): 5 TABLET ORAL at 07:19

## 2024-04-18 RX ADMIN — OXYCODONE HYDROCHLORIDE 5 MILLIGRAM(S): 5 TABLET ORAL at 13:24

## 2024-04-18 RX ADMIN — Medication 2 MILLIGRAM(S): at 07:19

## 2024-04-18 RX ADMIN — FAMOTIDINE 20 MILLIGRAM(S): 10 INJECTION INTRAVENOUS at 06:19

## 2024-04-18 RX ADMIN — OXYCODONE HYDROCHLORIDE 5 MILLIGRAM(S): 5 TABLET ORAL at 19:41

## 2024-04-18 RX ADMIN — APIXABAN 5 MILLIGRAM(S): 2.5 TABLET, FILM COATED ORAL at 06:19

## 2024-04-18 RX ADMIN — CLOPIDOGREL BISULFATE 75 MILLIGRAM(S): 75 TABLET, FILM COATED ORAL at 16:05

## 2024-04-18 RX ADMIN — FAMOTIDINE 20 MILLIGRAM(S): 10 INJECTION INTRAVENOUS at 17:16

## 2024-04-18 RX ADMIN — GABAPENTIN 600 MILLIGRAM(S): 400 CAPSULE ORAL at 06:20

## 2024-04-18 RX ADMIN — APIXABAN 5 MILLIGRAM(S): 2.5 TABLET, FILM COATED ORAL at 17:16

## 2024-04-18 RX ADMIN — OXYCODONE HYDROCHLORIDE 5 MILLIGRAM(S): 5 TABLET ORAL at 01:04

## 2024-04-18 RX ADMIN — OXYCODONE HYDROCHLORIDE 5 MILLIGRAM(S): 5 TABLET ORAL at 02:04

## 2024-04-18 NOTE — PROGRESS NOTE ADULT - PROBLEM SELECTOR PLAN 4
Multiple stents, last placed 6/2022. Home medication: Plavix 75mg PO daily and Metoprolol succinate XR 50mg PO daily. Lipitor 80mg qhs    - c/w Plavix  - c/w Lipitor

## 2024-04-18 NOTE — DISCHARGE NOTE NURSING/CASE MANAGEMENT/SOCIAL WORK - NSDCVIVACCINE_GEN_ALL_CORE_FT
Tdap; 25-Apr-2019 12:41; Neena Chavez (ARMANDO); [employer]; [id number] (Exp. Date: 02-Nov-2020); IntraMuscular; Deltoid Right.; 0.5 milliLiter(s); VIS (VIS Published: 09-May-2013, VIS Presented: 25-Apr-2019);

## 2024-04-18 NOTE — PROGRESS NOTE ADULT - PROBLEM SELECTOR PROBLEM 3
Personal history of pulmonary embolism

## 2024-04-18 NOTE — PROGRESS NOTE ADULT - PROBLEM SELECTOR PROBLEM 1
Type 2 diabetes mellitus with other specified complication, with long-term current use of insulin
Reactive hypoglycemia
Reactive hypoglycemia
Type 2 diabetes mellitus with other specified complication, with long-term current use of insulin
Type 2 diabetes mellitus with other specified complication, with long-term current use of insulin
Reactive hypoglycemia

## 2024-04-18 NOTE — PROGRESS NOTE ADULT - PROVIDER SPECIALTY LIST ADULT
Diabetes
Internal Medicine
Endocrinology
Endocrinology
Hospitalist
Internal Medicine
Internal Medicine
Endocrinology
Internal Medicine
Internal Medicine
Endocrinology
Internal Medicine
Endocrinology

## 2024-04-18 NOTE — DISCHARGE NOTE NURSING/CASE MANAGEMENT/SOCIAL WORK - NSDCFUADDAPPT_GEN_ALL_CORE_FT
Please follow up with Dr. Joseph on 7/22/2024 At 10AM  [facility]  [address] [place], [place] [id number]

## 2024-04-18 NOTE — PROGRESS NOTE ADULT - ASSESSMENT
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  In ED, patient was tachycardic but VS otherwise stable. Her BG was initially 46 and she had a mild WBC and elevated lactate. She improved on a D5 drip and was trialed off, however BG started downtrending again and was restarted. Suspect current episode triggered by restarting ozempic at increased dose.   Of note, patient is aggressively trying to achieve A1C <8% in order to have hernia repaired.    Fasting hypoglycemic on small dose of lantus. Will await hypoglycemic labs to result.  Likely hyperinsulinemic. Avoid overtreating lows with dextrose IVP or large quantities of juice whenever possible to avoid stimulating excess endogenous insulin production.   AM Cortisol level was 4, 2 days after receiving solumedrol, likely still some suppression. No other s/s of adrenal insufficiency. TFTs normal. Repeat AM cortisol was 15.3. R/O adrenal insufficiency as contributing factor to hypoglycemia.    A1C: 8.8% (March 2024),  10.8 % (Feb 2024), 16% (Jan 1, 2024)  C-peptide 9.7, Insulin abs negative, free insulin 49 (high, 0-17) and total insulin 50 with  (Feb 2024)  BUN: 10  Creatinine: 0.78  GFR: 96  Weight: 111.6  BMI: 38.5 43F PMH with prior CVA in 2021 (some mild residual left sided weakness), PE in 2023 (on Eliquis), multiple abdominal hernia repairs, nec fasc of abdomen in 2021 s/p corbin procedure w diverting ostomy, DM2 with complications ( likely gastroparesis) and recent improved A1c 16--->8, CAD with multiple stents (last in June 2023), presenting with nausea and vomiting for 4 days, increased ostomy output, lower abdominal pain and symptomatic hypoglycemia, being admitted for management of her hypoglycemia.  In ED, patient was tachycardic but VS otherwise stable. Her BG was initially 46 and she had a mild WBC and elevated lactate. She improved on a D5 drip and was trialed off, however BG started downtrending again and was restarted. Suspect current episode triggered by restarting ozempic at increased dose.   Of note, patient is aggressively trying to achieve A1C <8% in order to have hernia repaired.    Fasting hypoglycemic on small dose of lantus. Will await hypoglycemic labs to result.  Likely hyperinsulinemic. Avoid overtreating lows with dextrose IVP or large quantities of juice whenever possible to avoid stimulating excess endogenous insulin production.   AM Cortisol level was 4, 2 days after receiving solumedrol, likely still some suppression. No other s/s of adrenal insufficiency. TFTs normal. Repeat AM cortisol was 15.3. R/O adrenal insufficiency as contributing factor to hypoglycemia.    A1C: 8.8% (March 2024),  10.8 % (Feb 2024), 16% (Jan 1, 2024)  C-peptide 9.7, Insulin abs negative, free insulin 49 (high, 0-17) and total insulin 50 with  (Feb 2024)  C-peptide 0.6, insulin 3.6 with BG 48 (4/17/2024)  BUN: 12  Creatinine: 0.89  GFR: 82  Weight: 111.6  BMI: 38.5 43F PMH with prior CVA in 2021 (some mild residual left sided weakness), PE in 2023 (on Eliquis), multiple abdominal hernia repairs, nec fasc of abdomen in 2021 s/p corbin procedure w diverting ostomy, DM2 with complications ( likely gastroparesis) and recent improved A1c 16--->8, CAD with multiple stents (last in June 2023), presenting with nausea and vomiting for 4 days, increased ostomy output, lower abdominal pain and symptomatic hypoglycemia, being admitted for management of her hypoglycemia.  In ED, patient was tachycardic but VS otherwise stable. Her BG was initially 46 and she had a mild WBC and elevated lactate. She improved on a D5 drip and was trialed off, however BG started downtrending again and was restarted. Suspect current episode triggered by restarting ozempic at increased dose.   Of note, patient is aggressively trying to achieve A1C <8% in order to have hernia repaired.    Requiring no insulin in hospital, so we discharge on small amount of basal/bolus if glucose increase at home. Will stop Ozempic out of concern for gastroparesis causing GI SE. Will try Gene uva instead. Metformin caused diarrhea in the past.   Likely hyperinsulinemic. Avoid overtreating lows with dextrose IVP or large quantities of juice whenever possible to avoid stimulating excess endogenous insulin production.   AM Cortisol level was 4, 2 days after receiving solumedrol, likely still some suppression. No other s/s of adrenal insufficiency. TFTs normal. Repeat AM cortisol was 15.3. R/O adrenal insufficiency as contributing factor to hypoglycemia.    A1C: 8.8% (March 2024),  10.8 % (Feb 2024), 16% (Jan 1, 2024)  C-peptide 9.7, Insulin abs negative, free insulin 49 (high, 0-17) and total insulin 50 with  (Feb 2024)  C-peptide 0.6, insulin 3.6 with BG 48 (4/17/2024)  BUN: 12  Creatinine: 0.89  GFR: 82  Weight: 111.6  BMI: 38.5

## 2024-04-18 NOTE — PROGRESS NOTE ADULT - PROBLEM SELECTOR PROBLEM 2
Type 2 diabetes mellitus with other specified complication, with long-term current use of insulin

## 2024-04-18 NOTE — DISCHARGE NOTE NURSING/CASE MANAGEMENT/SOCIAL WORK - PATIENT PORTAL LINK FT
You can access the FollowMyHealth Patient Portal offered by [facility] by registering at the following website: [email or web address]. By joining [facility]’s FollowMyHealth portal, you will also be able to view your health information using other applications (apps) compatible with our system.

## 2024-04-18 NOTE — PROGRESS NOTE ADULT - PROBLEM SELECTOR PLAN 1
Most recent POCT A1c 8%, likely inaccurate given her labile sugars and frequently low sugars. C peptide 9.7 in May of 2023. Patient likely with gastroparesis fair leading to nausea and vomiting and poor PO and likely hypoglycemia from poor PO and endogenous insulin production. Home medications: Ozempic 1g qweek (last injection 4/2), Lantus 40u qhs, Lispro 15u TID with meals. Several episodes of BG <60 since admission, no BG levels >200  Cortisol level low at 4 from 4/13; f/u repeat AM cortisol and ACTH on 4/16 --> cortisol wnl  - endo recs: dc'd insulin  - D5W LR   - Tigan 200mg IM q8h PRN for nausea  - f/u cpeptide, insulin

## 2024-04-18 NOTE — PROGRESS NOTE ADULT - ATTENDING COMMENTS
Pt is 45 yo woman with obesity, abdominal hernia pending repair, colostomy, CAD with multiple stents, recent admission for hypoglycemia, recent change in insulin regimen, as per dexcom improved glycemic control over the past 3 month, admitted for 'not feeling well'. Pt was found to be hypoglycemic on this admission and started on D5 drip.  -----Hypoglycemia resolved with improved PO intake and cessation of insulin therapy. Planned for discharge today off insulin with instruction to restart Ozempic as outpatient under supervision of her MD  -----Chronic back, leg, abdominal pain. Will offer low dose oxycodone while in hospital and on discharge  -----Colostomy care as per protocol.

## 2024-04-18 NOTE — PROGRESS NOTE ADULT - PROBLEM SELECTOR PLAN 1
Type 2 diabetes mellitus with hyperglycemia  - Stop standing insulin.  - Decrease D5 to 50ml/hr at 10PM and discontinue tomorrow morning.  - Limit dextrose IVP/overtreatment with juice for hypoglycemia if able to reduce reactive hypoglycemia.  - Monitor FSG before meals and at bedtime.  - Limit dextrose IVP if able to reduce reactive hypoglycemia.  - F/U hypoglycemia w/u collected today when serum BG 48: please obtain the following prior to treatment: C-peptide, insulin, proinsulin, BMP, BHB.   - Patient can follow up at discharge with Dr Joseph on 7/22/2024 At 10AM at [facility]. Type 2 diabetes mellitus with hyperglycemia  - Limit dextrose IVP/overtreatment with juice for hypoglycemia if able to reduce reactive hypoglycemia.  - Monitor FSG before meals and at bedtime.  - F/U hypoglycemia w/u collected today when serum BG 48: please obtain the following prior to treatment: C-peptide, insulin, proinsulin, BMP, BHB.   - Patient can follow up at discharge with Dr Joseph on 7/22/2024 At 10AM at [facility]. Type 2 diabetes mellitus with hyperglycemia  - Limit dextrose IVP/overtreatment with juice for hypoglycemia if able to reduce reactive hypoglycemia.  - Monitor FSG before meals and at bedtime.  - Discharge recs: Lantus - If fasting FSG >200, take 10 units. If fasting FSG >300, take 15 units. Lispro - low dose sliding scale starting at 150mg/dL before meals and at bedtime. Start Januvia 100mg before breakfast. No metformin. No U500.  - Patient can follow up at discharge with Dr Joseph on 7/22/2024 At 10AM at [facility]. Type 2 diabetes mellitus with hyperglycemia  - Limit dextrose IVP/overtreatment with juice for hypoglycemia if able to reduce reactive hypoglycemia.  - Monitor FSG before meals and at bedtime.  - Discharge recs: Lantus - If fasting FSG >200, take 10 units. If fasting FSG >300, take 15 units. Lispro - low dose sliding scale starting at 150mg/dL before meals and at bedtime. Start Januvia 100mg before breakfast. Stop Ozempic. No metformin. No U500.  - Patient can follow up at discharge with Dr Joseph on 7/22/2024 At 10AM at [facility].

## 2024-04-18 NOTE — PROGRESS NOTE ADULT - PROBLEM SELECTOR PLAN 3
PE in 2023. Home medication is Eliquis 5mg PO BID    - c/w home meds

## 2024-04-18 NOTE — PROGRESS NOTE ADULT - PROBLEM SELECTOR PLAN 6
D: consistent carb w/ evening snack  E: replete as necessary  DVT ppx: Eliquis  Dispo: [facility]  Code: FULL

## 2024-04-18 NOTE — PROGRESS NOTE ADULT - PROBLEM SELECTOR PROBLEM 5
History of creation of ostomy

## 2024-04-18 NOTE — PROGRESS NOTE ADULT - SUBJECTIVE AND OBJECTIVE BOX
SUBJECTIVE / INTERVAL HPI: Patient was seen and examined this morning.     CAPILLARY BLOOD GLUCOSE & INSULIN RECEIVED  74 mg/dL (04-17 @ 04:29)  66 mg/dL (04-17 @ 06:35)  46 mg/dL (04-17 @ 07:55)  68 mg/dL (04-17 @ 08:06)  117 mg/dL (04-17 @ 09:21)  63 mg/dL (04-17 @ 11:07)  48 mg/dL (04-17 @ 11:29)  70 mg/dL (04-17 @ 12:32)  145 mg/dL (04-17 @ 17:43)  192 mg/dL (04-17 @ 21:56)  157 mg/dL (04-18 @ 09:22)  119 mg/dL (04-18 @ 12:56)      REVIEW OF SYSTEMS  Constitutional:  Negative fever, chills or loss of appetite.  Eyes:  Negative blurry vision or double vision.  Cardiovascular:  Negative for chest pain or palpitations.  Respiratory:  Negative for cough, wheezing, or shortness of breath.    Gastrointestinal:  Negative for nausea, vomiting, diarrhea, constipation, or abdominal pain.  Genitourinary:  Negative frequency, urgency or dysuria.  Neurologic:  No headache, confusion, dizziness, lightheadedness.    PHYSICAL EXAM  Vital Signs Last 24 Hrs  T(C): 36.9 (18 Apr 2024 06:07), Max: 37 (17 Apr 2024 22:08)  T(F): 98.4 (18 Apr 2024 06:07), Max: 98.6 (17 Apr 2024 22:08)  HR: 103 (18 Apr 2024 06:07) (97 - 103)  BP: 123/79 (18 Apr 2024 06:07) (123/79 - 133/79)  BP(mean): --  RR: 18 (18 Apr 2024 06:07) (17 - 18)  SpO2: 98% (18 Apr 2024 06:07) (98% - 98%)    Parameters below as of 18 Apr 2024 06:07  Patient On (Oxygen Delivery Method): room air        Constitutional: Awake, alert, in no acute distress.   HEENT: Normocephalic, atraumatic, NAOMI.  Respiratory: Lungs clear to ausculation bilaterally.   Cardiovascular: regular rhythm, normal S1 and S2, no audible murmurs.   GI: soft, non-tender, non-distended, bowel sounds present. + ostomy  Extremities: No lower extremity edema.  Psychiatric: AAO x 3. Normal affect/mood.       LABS  CBC - WBC/HGB/HTC/PLT: 13.18/9.9/35.8/451 (04-17-24)  BMP - Na/K/Cl/Bicarb/BUN/Cr/Gluc/AG/eGFR: 137/4.7/102/24/10/0.78/48/11/96 (04-17-24)  Ca - 9.3 (04-17-24)  Phos - -- (04-17-24)  Mg - -- (04-17-24)  LFT - Alb/Tprot/Tbili/Dbili/AlkPhos/ALT/AST: 3.8/--/0.2/--/153/9/25 (04-17-24)    Thyroid Stimulating Hormone, Serum: 2.700 (04-15-24)  Total T4/Free T4: --/1.170 (04-15-24)    MEDICATIONS  MEDICATIONS  (STANDING):  acetaminophen     Tablet .. 650 milliGRAM(s) Oral every 6 hours  apixaban 5 milliGRAM(s) Oral every 12 hours  atorvastatin 80 milliGRAM(s) Oral at bedtime  clopidogrel Tablet 75 milliGRAM(s) Oral daily  clotrimazole 1% Vaginal Cream 1 Applicatorful Vaginal at bedtime  dextrose 10% Bolus 125 milliLiter(s) IV Bolus once  dextrose 5%. 1000 milliLiter(s) (50 mL/Hr) IV Continuous <Continuous>  dextrose 5%. 1000 milliLiter(s) (100 mL/Hr) IV Continuous <Continuous>  dextrose 50% Injectable 12.5 Gram(s) IV Push once  dextrose 50% Injectable 25 Gram(s) IV Push once  famotidine    Tablet 20 milliGRAM(s) Oral every 12 hours  gabapentin 600 milliGRAM(s) Oral three times a day  glucagon  Injectable 1 milliGRAM(s) IntraMuscular once  influenza   Vaccine 0.5 milliLiter(s) IntraMuscular once  lidocaine   4% Patch 1 Patch Transdermal once  melatonin 5 milliGRAM(s) Oral at bedtime  nortriptyline 10 milliGRAM(s) Oral at bedtime    MEDICATIONS  (PRN):  dextrose Oral Gel 15 Gram(s) Oral once PRN Blood Glucose LESS THAN 70 milliGRAM(s)/deciliter  loperamide 2 milliGRAM(s) Oral every 4 hours PRN increased ostomy output  oxyCODONE    IR 5 milliGRAM(s) Oral every 6 hours PRN Moderate Pain (4 - 6)         SUBJECTIVE / INTERVAL HPI: Patient was seen and examined this morning. Feeling better.   Didn't eat dinner, 7PM SG in 80s, and had regular water ice. 2AM SG dipped again and had 1 jelly rancher. 8AM SG 60, didn't treat, but had banana around 10AM. FSG now stable.  Dextrose @ 50ml/hr running overnight and stopped this AM.   C-peptide 0.6, insulin 3.6 with BG 48 (4/17/2024)    CAPILLARY BLOOD GLUCOSE & INSULIN RECEIVED  74 mg/dL (04-17 @ 04:29)  66 mg/dL (04-17 @ 06:35)  46 mg/dL (04-17 @ 07:55)  68 mg/dL (04-17 @ 08:06)  117 mg/dL (04-17 @ 09:21)  63 mg/dL (04-17 @ 11:07)  48 mg/dL (04-17 @ 11:29)  70 mg/dL (04-17 @ 12:32)  145 mg/dL (04-17 @ 17:43)  192 mg/dL (04-17 @ 21:56)  157 mg/dL (04-18 @ 09:22)  119 mg/dL (04-18 @ 12:56)      REVIEW OF SYSTEMS  Constitutional:  Negative fever, chills or loss of appetite.  Eyes:  Negative blurry vision or double vision.  Cardiovascular:  Negative for chest pain or palpitations.  Respiratory:  Negative for cough, wheezing, or shortness of breath.    Gastrointestinal:  Negative for nausea, vomiting, diarrhea, constipation, or abdominal pain.  Genitourinary:  Negative frequency, urgency or dysuria.  Neurologic:  No headache, confusion, dizziness, lightheadedness.    PHYSICAL EXAM  Vital Signs Last 24 Hrs  T(C): 36.9 (18 Apr 2024 06:07), Max: 37 (17 Apr 2024 22:08)  T(F): 98.4 (18 Apr 2024 06:07), Max: 98.6 (17 Apr 2024 22:08)  HR: 103 (18 Apr 2024 06:07) (97 - 103)  BP: 123/79 (18 Apr 2024 06:07) (123/79 - 133/79)  BP(mean): --  RR: 18 (18 Apr 2024 06:07) (17 - 18)  SpO2: 98% (18 Apr 2024 06:07) (98% - 98%)    Parameters below as of 18 Apr 2024 06:07  Patient On (Oxygen Delivery Method): room air    Constitutional: Awake, alert, in no acute distress.   HEENT: Normocephalic, atraumatic, NAOMI.  Respiratory: Lungs clear to ausculation bilaterally.   Cardiovascular: regular rhythm, normal S1 and S2, no audible murmurs.   GI: soft, non-tender, non-distended, bowel sounds present. + ostomy  Extremities: No lower extremity edema.  Psychiatric: AAO x 3. Normal affect/mood.     LABS  CBC - WBC/HGB/HTC/PLT: 13.18/9.9/35.8/451 (04-17-24)  BMP - Na/K/Cl/Bicarb/BUN/Cr/Gluc/AG/eGFR: 137/4.7/102/24/10/0.78/48/11/96 (04-17-24)  Ca - 9.3 (04-17-24)  Phos - -- (04-17-24)  Mg - -- (04-17-24)  LFT - Alb/Tprot/Tbili/Dbili/AlkPhos/ALT/AST: 3.8/--/0.2/--/153/9/25 (04-17-24)    Thyroid Stimulating Hormone, Serum: 2.700 (04-15-24)  Total T4/Free T4: --/1.170 (04-15-24)    MEDICATIONS  MEDICATIONS  (STANDING):  acetaminophen     Tablet .. 650 milliGRAM(s) Oral every 6 hours  apixaban 5 milliGRAM(s) Oral every 12 hours  atorvastatin 80 milliGRAM(s) Oral at bedtime  clopidogrel Tablet 75 milliGRAM(s) Oral daily  clotrimazole 1% Vaginal Cream 1 Applicatorful Vaginal at bedtime  dextrose 10% Bolus 125 milliLiter(s) IV Bolus once  dextrose 5%. 1000 milliLiter(s) (50 mL/Hr) IV Continuous <Continuous>  dextrose 5%. 1000 milliLiter(s) (100 mL/Hr) IV Continuous <Continuous>  dextrose 50% Injectable 12.5 Gram(s) IV Push once  dextrose 50% Injectable 25 Gram(s) IV Push once  famotidine    Tablet 20 milliGRAM(s) Oral every 12 hours  gabapentin 600 milliGRAM(s) Oral three times a day  glucagon  Injectable 1 milliGRAM(s) IntraMuscular once  influenza   Vaccine 0.5 milliLiter(s) IntraMuscular once  lidocaine   4% Patch 1 Patch Transdermal once  melatonin 5 milliGRAM(s) Oral at bedtime  nortriptyline 10 milliGRAM(s) Oral at bedtime    MEDICATIONS  (PRN):  dextrose Oral Gel 15 Gram(s) Oral once PRN Blood Glucose LESS THAN 70 milliGRAM(s)/deciliter  loperamide 2 milliGRAM(s) Oral every 4 hours PRN increased ostomy output  oxyCODONE    IR 5 milliGRAM(s) Oral every 6 hours PRN Moderate Pain (4 - 6)

## 2024-04-19 LAB — PROINSULIN SERPL-MCNC: 2.2 PMOL/L — SIGNIFICANT CHANGE UP (ref 0–10)

## 2024-04-19 RX ORDER — SEMAGLUTIDE 1.34 MG/ML
4 INJECTION, SOLUTION SUBCUTANEOUS
Qty: 3 | Refills: 3 | Status: DISCONTINUED | COMMUNITY
Start: 2024-03-25 | End: 2024-04-19

## 2024-04-29 DIAGNOSIS — E11.65 TYPE 2 DIABETES MELLITUS WITH HYPERGLYCEMIA: ICD-10-CM

## 2024-04-29 DIAGNOSIS — K40.20 BILATERAL INGUINAL HERNIA, WITHOUT OBSTRUCTION OR GANGRENE, NOT SPECIFIED AS RECURRENT: ICD-10-CM

## 2024-04-29 DIAGNOSIS — I25.10 ATHEROSCLEROTIC HEART DISEASE OF NATIVE CORONARY ARTERY WITHOUT ANGINA PECTORIS: ICD-10-CM

## 2024-04-29 DIAGNOSIS — K43.5 PARASTOMAL HERNIA WITHOUT OBSTRUCTION OR GANGRENE: ICD-10-CM

## 2024-04-29 DIAGNOSIS — Z91.013 ALLERGY TO SEAFOOD: ICD-10-CM

## 2024-04-29 DIAGNOSIS — K31.84 GASTROPARESIS: ICD-10-CM

## 2024-04-29 DIAGNOSIS — E16.0 DRUG-INDUCED HYPOGLYCEMIA WITHOUT COMA: ICD-10-CM

## 2024-04-29 DIAGNOSIS — I69.354 HEMIPLEGIA AND HEMIPARESIS FOLLOWING CEREBRAL INFARCTION AFFECTING LEFT NON-DOMINANT SIDE: ICD-10-CM

## 2024-04-29 DIAGNOSIS — E66.9 OBESITY, UNSPECIFIED: ICD-10-CM

## 2024-04-29 DIAGNOSIS — Z79.01 LONG TERM (CURRENT) USE OF ANTICOAGULANTS: ICD-10-CM

## 2024-04-29 DIAGNOSIS — Z86.711 PERSONAL HISTORY OF PULMONARY EMBOLISM: ICD-10-CM

## 2024-04-29 DIAGNOSIS — R10.9 UNSPECIFIED ABDOMINAL PAIN: ICD-10-CM

## 2024-04-29 DIAGNOSIS — E11.43 TYPE 2 DIABETES MELLITUS WITH DIABETIC AUTONOMIC (POLY)NEUROPATHY: ICD-10-CM

## 2024-04-29 DIAGNOSIS — Z93.3 COLOSTOMY STATUS: ICD-10-CM

## 2024-04-29 DIAGNOSIS — E78.5 HYPERLIPIDEMIA, UNSPECIFIED: ICD-10-CM

## 2024-04-29 DIAGNOSIS — T38.3X5A ADVERSE EFFECT OF INSULIN AND ORAL HYPOGLYCEMIC [ANTIDIABETIC] DRUGS, INITIAL ENCOUNTER: ICD-10-CM

## 2024-04-29 DIAGNOSIS — Z79.4 LONG TERM (CURRENT) USE OF INSULIN: ICD-10-CM

## 2024-04-29 DIAGNOSIS — Z88.0 ALLERGY STATUS TO PENICILLIN: ICD-10-CM

## 2024-04-29 DIAGNOSIS — Z95.5 PRESENCE OF CORONARY ANGIOPLASTY IMPLANT AND GRAFT: ICD-10-CM

## 2024-04-29 DIAGNOSIS — E87.20 ACIDOSIS, UNSPECIFIED: ICD-10-CM

## 2024-04-29 DIAGNOSIS — Z88.3 ALLERGY STATUS TO OTHER ANTI-INFECTIVE AGENTS: ICD-10-CM

## 2024-05-15 ENCOUNTER — APPOINTMENT (OUTPATIENT)
Dept: ENDOCRINOLOGY | Facility: CLINIC | Age: 44
End: 2024-05-15
Payer: MEDICAID

## 2024-05-15 VITALS
DIASTOLIC BLOOD PRESSURE: 107 MMHG | TEMPERATURE: 97.8 F | OXYGEN SATURATION: 99 % | HEIGHT: 66 IN | SYSTOLIC BLOOD PRESSURE: 163 MMHG | WEIGHT: 228 LBS | HEART RATE: 94 BPM | BODY MASS INDEX: 36.64 KG/M2

## 2024-05-15 DIAGNOSIS — E11.9 TYPE 2 DIABETES MELLITUS W/OUT COMPLICATIONS: ICD-10-CM

## 2024-05-15 LAB
GLUCOSE BLDC GLUCOMTR-MCNC: 153
HBA1C MFR BLD HPLC: 9.2

## 2024-05-15 PROCEDURE — 83036 HEMOGLOBIN GLYCOSYLATED A1C: CPT | Mod: QW

## 2024-05-15 PROCEDURE — 99214 OFFICE O/P EST MOD 30 MIN: CPT

## 2024-05-15 PROCEDURE — 95251 CONT GLUC MNTR ANALYSIS I&R: CPT

## 2024-05-15 RX ORDER — INSULIN LISPRO 100 [IU]/ML
100 INJECTION, SOLUTION INTRAVENOUS; SUBCUTANEOUS
Qty: 5 | Refills: 3 | Status: DISCONTINUED | COMMUNITY
Start: 2024-04-04 | End: 2024-05-15

## 2024-05-15 RX ORDER — INSULIN LISPRO 100 U/ML
100 INJECTION, SOLUTION SUBCUTANEOUS
Qty: 4 | Refills: 0 | Status: ACTIVE | COMMUNITY
Start: 1900-01-01 | End: 1900-01-01

## 2024-05-15 RX ORDER — INSULIN HUMAN 500 [IU]/ML
500 INJECTION, SOLUTION SUBCUTANEOUS
Qty: 6 | Refills: 0 | Status: DISCONTINUED | COMMUNITY
Start: 2020-10-16 | End: 2024-05-15

## 2024-05-15 RX ORDER — BLOOD-GLUCOSE SENSOR
EACH MISCELLANEOUS
Qty: 6 | Refills: 3 | Status: ACTIVE | COMMUNITY
Start: 2024-03-25 | End: 1900-01-01

## 2024-05-15 NOTE — REVIEW OF SYSTEMS
[As Noted in HPI] : as noted in HPI [Fatigue] : no fatigue [Dysphagia] : no dysphagia [Dysphonia] : no dysphonia [Chest Pain] : no chest pain [Nausea] : no nausea [Vomiting] : no vomiting [Cold Intolerance] : no cold intolerance [de-identified] : hair loss and facial rash

## 2024-05-15 NOTE — HISTORY OF PRESENT ILLNESS
[FreeTextEntry1] : Patient is a 45 yo woman with uncontrolled type 2 diabetes, HTN and HLD here for diabetes follow up.  Type 2 diabetes diagnosed in 2005 based on blood work. In 2007, she was started metformin 1000 mg BID. In 2009, patient was pregnant and started on glyburide- did not require insulin for pregnancy. States that when she lost weight, diabetes got worse. Check sugars 3-4 times a day Patient has had multiple infections and hospitalizations. In 2022, she had SUPRAPUBIC NECROTIZING FASCITIS, then discharged to rehab. She has had a long, complicated journey with infections and sepsis. Reports that at some point she had a stroke and the sepsis required several surgeries. She was in the ICU as well. Patient has had colectomy and also doing suprapubic catheter She established endocrine care here October 2020. Over time, we changed insulin regimen to U500. In the interval she has was found to have a breast lump and is high risk for cancer. She was supposed to have surgery but it has been delayed. She had biopsy and clips. Patient still has been loss to follow up because she has had a lot of medical issues in between. The patient still has her colostomy bag and a hernia that need to be addressed. She has DEXCOM device and Since discharge from hospital, Lantus 30 units daily and lispro 15 units TID with meals.  Ozempic was discontinued and she was given januvia 50 mg which she states adherence to The patient has had weight loss.  Hernia surgery is pending an A1c of 8% Diet: water, no soda, no juice; some sort of medicine is giving her nausea.  Not eating much due to nausea. Unsalted saltine crackers, chicken soup/broth

## 2024-05-15 NOTE — PHYSICAL EXAM
[Alert] : alert [No Acute Distress] : no acute distress [No Respiratory Distress] : no respiratory distress [Clear to Auscultation] : lungs were clear to auscultation bilaterally [Normal Rate] : heart rate was normal [Normal Bowel Sounds] : normal bowel sounds [Soft] : abdomen soft [Normal Affect] : the affect was normal [Normal Insight/Judgement] : insight and judgment were intact [Normal Mood] : the mood was normal [de-identified] : LLQ colostomy bag [de-identified] : facial rash along nasolabial folds

## 2024-05-15 NOTE — ASSESSMENT
[Diabetes Foot Care] : diabetes foot care [Long Term Vascular Complications] : long term vascular complications of diabetes [Carbohydrate Consistent Diet] : carbohydrate consistent diet [Importance of Diet and Exercise] : importance of diet and exercise to improve glycemic control, achieve weight loss and improve cardiovascular health [Hypoglycemia Management] : hypoglycemia management [Glucagon Use] : glucagon use [Action and use of Insulin] : action and use of short and long-acting insulin [Self Monitoring of Blood Glucose] : self monitoring of blood glucose [Insulin Self-Administration] : insulin self-administration [Injection Technique, Storage, Sharps Disposal] : injection technique, storage, and sharps disposal [FreeTextEntry1] : Patient is a 45 yo woman here for diabetes follow up.  1. Uncontrolled diabetes -her April 2024 serum A1c 8.2% *see HIE* -POCT A1c today is 9.2% with serum glucose of 153 -she was hospitalized for leaking colostomy and discharged on basal/bolus insulin lantus and lispro. U500 was discontinued, ozempic was discontinued as well. Januvia 50 mg was started -the patient has had multiple hospitalizations for various reasons -no CGM data at this time; refills for G7 and two samples provided today -the patient has been taking lantus 30 and lispro 15 TID with meals but reports one of the insulins is causing a rash. On exam, she has erythematous rash to nasolabial folds. I will change lispro to brand admelog as she took that without issues. Recommend increasing admelog to 18 units TID with meals, continue lantus and engage in SMBG. She had two cgms that did not work.  Patient needs to check sugars so insulin and medicines can be adjusted accordingly -stay hydrated with water -insulin and all supplies sent to pharmacy -hypoglycemia education discussed -encourage consistent carbohydrate, sugar limited diet Follow up in 2 weeks

## 2024-05-22 NOTE — DIETITIAN INITIAL EVALUATION ADULT. - OTHER INFO
English
39yo F w/  h/o HTN, DM, HLD, MO, prior ventral hernia repair (1/2017) c/b wound infection and abdominal wall collection associated w/ hernia mesh. Treatment course/duration PTA is unclear. Pt now p/w pelvic pain and malodorous drainage from abdominal wound. S/p I&D (4/17/18).  Pt known to RD from previous admission w/ h/o poor PO intake. Pt seen resting in bed. Currently on CSTCHO diet and tolerating PO. Has had poor to fair intake, ~25% meals per dietary recall. Observed 100% yogrut and bites of fruit consumed at bedside. Reports at home she consumes eggs and greek yogurt for protein. Pt c/o of not receiving foods she is interested 2/2 dietary restrictions. Reviewed menu w/ patient and discussed possibility of ordering per her preference and adjusting portion sizes of high CHO/sugar foods. Pt was receptive and appreciative of flexibility. UBW at last admission was 230lb, current BW is 210lbs. Discussed w/ RN obtaining new standing wt for accuracy.  Inquired if pt feels she has lost weight, states she "knows" she has as the skin on her arms is looser. Discussed importance of meeting kcal/protein needs for wound healing, infection and unintentional wt loss w/ h/o poor PO intake. C/o nausea- attributes it to medication. No vomiting, diarrhea or constipation. Allergic to shellfish, no other dietary restrictions. Skin: surgical wound w/ packing; GI WDL per flowsheet.

## 2024-05-28 ENCOUNTER — APPOINTMENT (OUTPATIENT)
Dept: ENDOCRINOLOGY | Facility: CLINIC | Age: 44
End: 2024-05-28

## 2024-05-31 NOTE — PROGRESS NOTE ADULT - PROBLEM SELECTOR PLAN 2
Received call from patient stating her oxygen saturation was less than 90%. Typically patient reports her oxygen saturation is around 94%. Patient did her nebs/vest and oxygen did increase to 90-92%. She is currently on treatment for PsA with Cipro and Bay nebs and patient said she was instructed to inhale bay nebs thru nose to penetrate sinuses. Patient is wondering if PsA has now gone into lungs.    Given patient's report of oxygen saturation less than 90% I have recommended that patient present to ER for evaluation.   Patient reluctant to go to ER and may choose to monitor symptoms at home.     I reiterated recommendation to be evaluated in ER.    Ann Alfonso RN    Pt endorses symptoms resolved for 2-3wks after treatment before recurrence. Physical exam w/ erythema, several white plaques, and ruptured bullae. Hx s/f prior admission for vulvovaginal candiasis, treated with fluconazole and clotrimazole. OBGYN consulted, their assessment is that pt has complicated vaginitis due to immunocompromised status (uncontrolled DM), sx severity, and recurrent candidiasis.  -genital cervical swab growing yeast; perineal swab growing staph aureus, rare candida albicans, and rare strep anginosus  Plan:  -Per OBGYN: fluconazole 150mg PO q72hrs for 3 doses (6/16- ) followed by 150mg qweekly x6mo, nystatin powder, and pain management  -F/u cervical clx, ulcer clx, vaginitis panel, and urine G/C

## 2024-06-02 NOTE — ED PROVIDER NOTE - CONSTITUTIONAL NEGATIVE STATEMENT, MLM
[facility] - Intensive Care ([facility])  Critical Care Medicine  Consult Note    Patient Name: Wilfred Beach  MRN: [id number]  Admission Date: 6/1/2024  Hospital Length of Stay: 0 days  Code Status: Full Code  Attending Physician: Ena Daniels MD   Primary Care Provider: No, Primary Doctor   Principal Problem: ST elevation myocardial infarction involving left anterior descending (LAD) coronary artery    Inpatient consult to Critical Care Medicine  Consult performed by: Nataliia Garg ACN-BC  Consult ordered by: Ena Daniels MD        Subjective:     HPI:  30 year old male with elevated BMI but appears muscular/physically fit   Presented to ED on 6/1 for CP that started at rest  EKG with acute anterolateral ST elevation  Taken for emergent LHC, per nurse report he had one stent placed  CC team now consulted to assist with management    Hospital/ICU Course:  Seen and examined after notification of consult by nursing staff  AAOx3, CP less (4/10), vomited earlier but denies current nausea.  Unsure but thinks he has uncles that had MIs at young age    No past medical history on file.    Past Surgical History:   Procedure Laterality Date    ORIF MANDIBULAR FRACTURE Bilateral 5/31/2021    Procedure: ORIF, FRACTURE, MANDIBLE;  Surgeon: Michael Newberry MD;  Location: [facility];  Service: Plastics;  Laterality: Bilateral;  Nasal intubation       Review of patient's allergies indicates:  No Known Allergies    Family History    None       Tobacco Use    Smoking status: Some Days     Types: Cigarettes    Smokeless tobacco: Never   Substance and Sexual Activity    Alcohol use: Never    Drug use: Never    Sexual activity: Not on file         Review of Systems   Constitutional:  Negative for chills and fever.   HENT:  Negative for congestion and sinus pressure.    Respiratory:  Negative for cough and shortness of breath.    Cardiovascular:  Positive for chest pain. Negative for palpitations and leg swelling.    Gastrointestinal: Negative.    Musculoskeletal: Negative.    Skin: Negative.    Neurological: Negative.    Psychiatric/Behavioral: Negative.       Objective:     Vital Signs (Most Recent):  Temp: 98.6 °F (37 °C) (06/01/24 1915)  Pulse: 71 (06/01/24 2045)  Resp: (!) 35 (06/01/24 2045)  BP: 127/71 (06/01/24 2045)  SpO2: 98 % (06/01/24 2045) Vital Signs (24h Range):  Temp:  [98.1 °F (36.7 °C)-98.6 °F (37 °C)] 98.6 °F (37 °C)  Pulse:  [71-99] 71  Resp:  [0-53] 35  SpO2:  [97 %-100 %] 98 %  BP: (122-163)/(65-93) 127/71     Weight: 89 kg (196 lb 3.2 oz)  Body mass index is 31.67 kg/m².      Intake/Output Summary (Last 24 hours) at 6/1/2024 2150  Last data filed at 6/1/2024 1800  Gross per 24 hour   Intake 106.22 ml   Output 400 ml   Net -293.78 ml        Physical Exam  Vitals and nursing note reviewed.   Constitutional:       General: He is sleeping. He is not in acute distress.     Appearance: Normal appearance. He is well-groomed. He is ill-appearing. He is not toxic-appearing.   HENT:      Head: Atraumatic.   Eyes:      Conjunctiva/sclera: Conjunctivae normal.   Neck:      Vascular: No JVD.   Cardiovascular:      Rate and Rhythm: Normal rate and regular rhythm.      Pulses:           Radial pulses are 2+ on the right side and 2+ on the left side.        Dorsalis pedis pulses are 2+ on the right side and 2+ on the left side.   Pulmonary:      Effort: Pulmonary effort is normal.      Breath sounds: Normal breath sounds.   Abdominal:      General: Bowel sounds are normal. There is no distension.      Palpations: Abdomen is soft.   Musculoskeletal:      Right lower leg: No edema.      Left lower leg: No edema.   Skin:     General: Skin is warm and dry.      Capillary Refill: Capillary refill takes less than 2 seconds.          Neurological:      Mental Status: He is oriented to person, place, and time and easily aroused.      GCS: GCS eye subscore is 4. GCS verbal subscore is 5. GCS motor subscore is 6.      Cranial  "Nerves: Cranial nerves 2-12 are intact.   Psychiatric:         Attention and Perception: Attention normal.         Mood and Affect: Mood normal.         Speech: Speech normal.         Behavior: Behavior normal. Behavior is cooperative.         Thought Content: Thought content normal.          Vents:  Oxygen Concentration (%): 28 (06/01/24 1357)    Lines/Drains/Airways       Peripheral Intravenous Line  Duration                  Peripheral IV - Single Lumen 06/01/24 1356 18 G Anterior;Distal;Right Upper Arm <1 day         Peripheral IV - Single Lumen 06/01/24 18 G Left Antecubital <1 day                    Significant Labs:    CBC/Anemia Profile:  Recent Labs   Lab 06/01/24  1417   WBC 11.32   HGB 12.4*   HCT 39.5*      MCV 82   RDW 13.0        Chemistries:  Recent Labs   Lab 06/01/24  1417      K 3.9      CO2 18*   BUN 9   CREATININE 1.1   CALCIUM 9.4   ALBUMIN 4.1   PROT 8.3   BILITOT 0.7   ALKPHOS 76   ALT 39   AST 36            Component Ref Range & Units 06/01/24 1814   Benzodiazepines Negative Presumptive Positive Abnormal    Methadone metabolites Negative Negative   Cocaine (Metab.) Negative Negative   Opiate Scrn, Ur Negative Negative   Barbiturate Screen, Ur Negative Negative   Amphetamine Screen, Ur Negative Presumptive Positive Abnormal    THC Negative Presumptive Positive Abnormal    Phencyclidine Negative Negative   Creatinine, Urine 23.0 - 375.0 mg/dL 72.1          Troponin:   Recent Labs   Lab 06/01/24  1417   TROPONINI <0.006     All pertinent labs within the past 24 hours have been reviewed.    Significant Imaging:   I have reviewed all pertinent imaging results/findings within the past 24 hours.    ABG  No results for input(s): "PH", "PO2", "PCO2", "HCO3", "BE" in the last 168 hours.  Assessment/Plan:     Psychiatric  Recreational drug use  Endorses occasional marijuana smoking  Discussed positive drug screen with amphetamines which surprised him   We discussed risks with tainted " marijuana and more specifically recommendation to avoid amphetamine use now with cardiac issue    Cardiac/Vascular  * ST elevation myocardial infarction involving left anterior descending (LAD) coronary artery  Per report, one stent placed to 100% occluded LAD  Educated on importance of treatment plan and follow up post acute MI  Cardiology managing  Plan for ASA, statin, plavix, ARB, and BB therapy  Continuous ICU hemodynamic monitoring overnight        Critical Care Daily Checklist:    A: Awake: RASS Goal/Actual Goal:    Actual:     B: Spontaneous Breathing Trial Performed?     C: SAT & SBT Coordinated?  N/a                      D: Delirium: CAM-ICU Overall CAM-ICU: Negative   E: Early Mobility Performed? Yes   F: Feeding Goal:    Status:     Current Diet Order   No orders of the defined types were placed in this encounter.      AS: Analgesia/Sedation prn   T: Thromboembolic Prophylaxis Per cards while on aggrastat   H: HOB > 300 Yes   U: Stress Ulcer Prophylaxis (if needed) pepcid   G: Glucose Control monitor   B: Bowel Function     I: Indwelling Catheter (Lines & Fierro) Necessity reviewed   D: De-escalation of Antimicrobials/Pharmacotherapies reviewed    Plan for the day/ETD As above    Code Status:  Family/Goals of Care: Full Code  Home on discharge     Critical Care Time: 45 minutes  Critical secondary to STEMI of LAD   Critical care was time spent personally by me on the following activities: development of treatment plan with patient or surrogate and bedside caregivers, discussions with consultants, evaluation of patient's response to treatment, examination of patient, ordering and performing treatments and interventions, ordering and review of laboratory studies, ordering and review of radiographic studies, pulse oximetry, re-evaluation of patient's condition. This critical care time did not overlap with that of any other provider or involve time for any procedures.    Thank you for your consult. CC team  will follow and assist while requiring ICU level care.     MERT Damon-BC  Critical Care Medicine  [facility]ensive [facility] ([facility])   no fever but some chills.

## 2024-06-06 NOTE — DISCHARGE NOTE OB - PATIENT PORTAL LINK FT
Preventive Care Visit  [facility]  Miguelina Milian MD, Pediatrics  Jun 6, 2024    Assessment & Plan   7 year old 0 month old, here for preventive care.    Barbara was seen today for well child.    Diagnoses and all orders for this visit:    Encounter for routine child health examination w/o abnormal findings  -     BEHAVIORAL/EMOTIONAL ASSESSMENT (66391)  -     SCREENING TEST, PURE TONE, AIR ONLY    Mild intermittent asthma without complication  -     albuterol (PROAIR HFA/PROVENTIL HFA/VENTOLIN HFA) 108 (90 Base) MCG/ACT inhaler; Inhale 2 puffs into the lungs every 4 hours as needed for shortness of breath, wheezing or cough  -     spacer (OPTICHAMBER DEB) holding chamber; Holding/spacer device to use with inhaler AND PEDS MASK    Attention deficit hyperactivity disorder (ADHD), combined type  -     methylphenidate (METHYLIN) 5 MG/5ML SOLN; Take 5 mg by mouth 2 times daily (with meals)  -     methylphenidate (METHYLIN) 5 MG/5ML SOLN; Take 5 mg by mouth 2 times daily (with meals)  -     methylphenidate (METHYLIN) 5 MG/5ML SOLN; Take 5 mg by mouth 2 times daily (with meals)    Other orders  -     PRIMARY CARE FOLLOW-UP SCHEDULING; Future     Parent agrees with having albuterol on-hand in case she needs it. Refilled today.     The patient is doing well on current medications, with no concerns of side effects or desired medication changes. 6 months of refills provided, and will follow-up in another 6 months.      Growth      Normal height and weight    Immunizations   Vaccines up to date.    Anticipatory Guidance    Reviewed age appropriate anticipatory guidance.       Referrals/Ongoing Specialty Care  None  Verbal Dental Referral: Verbal dental referral was given          Subjective   Barbara is presenting for the following:  Well Child    Since our last visit in September 2023, the patient continues taking methylphenidate solution 5 mg twice daily for her ADHD. Parent says that things  "are going very well, with no complaints from the school. She gets a little emotional in the mornings after the first dose of Methylin, within 45-60 minutes, then she seems to improve within about another hour. Her emotions just seem heightened in general for that first hour, a bit more irritable. Devante says he would like to continue the current dosing, as it's worth it for how helpful it is in school and for her ADHD symptoms.     She hasn't used her albuterol inhaler in a very long time. She skipped her Methylin today.         6/6/2024     3:11 PM   Additional Questions   Questions for today's visit Yes   Questions wondering how she is for height and weight, medication questions and wondering about allergy medication   Surgery, major illness, or injury since last physical No           6/6/2024   Social   Lives with Parent(s)    Step Parent(s)   Recent potential stressors None   History of trauma No   Family Hx mental health challenges (!) YES   Lack of transportation has limited access to appts/meds No   Do you have housing?  Yes   Are you worried about losing your housing? No         6/6/2024    10:02 AM   Health Risks/Safety   What type of car seat does your child use? Booster seat with seat belt   Where does your child sit in the car?  Back seat   Do you have a swimming pool? No   Is your child ever home alone?  No   Do you have guns/firearms in the home? Decline to answer         6/6/2024    10:02 AM   TB Screening   Was your child born outside of the [place]? No         6/6/2024    10:02 AM   TB Screening: Consider immunosuppression as a risk factor for TB   Recent TB infection or positive TB test in family/close contacts No   Recent travel outside [place] (child/family/close contacts) No   Recent residence in high-risk group setting (correctional facility/health care facility/homeless shelter/refugee camp) No          No results for input(s): \"CHOL\", \"HDL\", \"LDL\", \"TRIG\", \"CHOLHDLRATIO\" in the last 72000 " hours.      6/6/2024    10:02 AM   Dental Screening   Has your child seen a dentist? Yes   When was the last visit? 3 months to 6 months ago   Has your child had cavities in the last 3 years? (!) YES, 1-2 CAVITIES IN THE LAST 3 YEARS- MODERATE RISK   Have parents/caregivers/siblings had cavities in the last 2 years? No         6/6/2024   Diet   What does your child regularly drink? Water    Cow's milk    (!) JUICE    (!) SPORTS DRINKS   What type of milk? (!) 2%   What type of water? Tap   How often does your family eat meals together? Every day   How many snacks does your child eat per day 2   At least 3 servings of food or beverages that have calcium each day? Yes   In past 12 months, concerned food might run out No   In past 12 months, food has run out/couldn't afford more No           6/6/2024    10:02 AM   Elimination   Bowel or bladder concerns? No concerns         6/6/2024   Activity   Days per week of moderate/strenuous exercise 5 days   On average, how many minutes do you engage in exercise at this level? 30 min   What does your child do for exercise?  Walks and goes to the park   What activities is your child involved with?  Community activities         6/6/2024    10:02 AM   Media Use   Hours per day of screen time (for entertainment) 60   Screen in bedroom No         6/6/2024    10:02 AM   Sleep   Do you have any concerns about your child's sleep?  No concerns, sleeps well through the night         6/6/2024    10:02 AM   School   School concerns No concerns   Grade in school 2nd Grade   Current school [employer]   School absences (>2 days/mo) No   Concerns about friendships/relationships? No         6/6/2024    10:02 AM   Vision/Hearing   Vision or hearing concerns No concerns         6/6/2024    10:02 AM   Development / Social-Emotional Screen   Developmental concerns (!) SCHOOL NURSE     Mental Health - PSC-17 required for C&TC  Social-Emotional screening:   Electronic PSC       6/6/2024    10:02 AM   PSC  "SCORES   Inattentive / Hyperactive Symptoms Subtotal 6   Externalizing Symptoms Subtotal 3   Internalizing Symptoms Subtotal 2   PSC - 17 Total Score 11       Follow up:  PSC-17 PASS (total score <15; attention symptoms <7, externalizing symptoms <7, internalizing symptoms <5)  no follow up necessary  No concerns         Objective     Exam  /60   Pulse 82   Temp 98.4  F (36.9  C) (Temporal)   Ht 4' 1.8\" (1.265 m)   Wt 56 lb 12.8 oz (25.8 kg)   SpO2 97%   BMI 16.10 kg/m    81 %ile (Z= 0.87) based on CDC (Girls, 2-20 Years) Stature-for-age data based on Stature recorded on 6/6/2024.  76 %ile (Z= 0.71) based on CDC (Girls, 2-20 Years) weight-for-age data using vitals from 6/6/2024.  64 %ile (Z= 0.37) based on [facility] (Girls, 2-20 Years) BMI-for-age based on BMI available as of 6/6/2024.  Blood pressure %vitaly are 89% systolic and 61% diastolic based on the 2017 AAP Clinical Practice Guideline. This reading is in the normal blood pressure range.    Vision Screen  Vision Screen Details  Reason Vision Screen Not Completed: Patient had exam in last 12 months    Hearing Screen  RIGHT EAR  1000 Hz on Level 40 dB (Conditioning sound): Pass  1000 Hz on Level 20 dB: Pass  2000 Hz on Level 20 dB: Pass  4000 Hz on Level 20 dB: Pass  LEFT EAR  4000 Hz on Level 20 dB: Pass  2000 Hz on Level 20 dB: Pass  1000 Hz on Level 20 dB: Pass  500 Hz on Level 25 dB: Pass  RIGHT EAR  500 Hz on Level 25 dB: Pass  Results  Hearing Screen Results: Pass      Physical Exam  GENERAL: Alert, well appearing, no distress  PSYCH: Hyperactive, talkative, silly but cooperative.  SKIN: Clear. No significant rash, abnormal pigmentation or lesions  HEAD: Normocephalic.  EYES:  Symmetric light reflex and no eye movement on cover/uncover test. Normal conjunctivae.  EARS: Normal canals. Tympanic membranes are normal; gray and translucent.  NOSE: Normal without discharge.  MOUTH/THROAT: Clear. No oral lesions. Teeth without obvious abnormalities.  NECK: " Supple, no masses.  No thyromegaly.  LYMPH NODES: No adenopathy  LUNGS: Clear. No rales, rhonchi, wheezing or retractions  HEART: Regular rhythm. Normal S1/S2. No murmurs. Normal pulses.  ABDOMEN: Soft, non-tender, not distended, no masses or hepatosplenomegaly. Bowel sounds normal.   GENITALIA: Normal female external genitalia. Jordan stage I,  No inguinal herniae are present.  EXTREMITIES: Full range of motion, no deformities  NEUROLOGIC: No focal findings. Cranial nerves grossly intact: DTR's normal. Normal gait, strength and tone      Prior to immunization administration, verified patients identity using patient s name and date of birth. Please see Immunization Activity for additional information.     Screening Questionnaire for Pediatric Immunization    Is the child sick today?   No   Does the child have allergies to medications, food, a vaccine component, or latex?   No   Has the child had a serious reaction to a vaccine in the past?   No   Does the child have a long-term health problem with lung, heart, kidney or metabolic disease (e.g., diabetes), asthma, a blood disorder, no spleen, complement component deficiency, a cochlear implant, or a spinal fluid leak?  Is he/she on long-term aspirin therapy?   Yes   If the child to be vaccinated is 2 through 4 years of age, has a healthcare provider told you that the child had wheezing or asthma in the  past 12 months?   No   If your child is a baby, have you ever been told he or she has had intussusception?   No   Has the child, sibling or parent had a seizure, has the child had brain or other nervous system problems?   No   Does the child have cancer, leukemia, AIDS, or any immune system         problem?   No   Does the child have a parent, brother, or sister with an immune system problem?   No   In the past 3 months, has the child taken medications that affect the immune system such as prednisone, other steroids, or anticancer drugs; drugs for the treatment of  rheumatoid arthritis, Crohn s disease, or psoriasis; or had radiation treatments?   No   In the past year, has the child received a transfusion of blood or blood products, or been given immune (gamma) globulin or an antiviral drug?   No   Is the child/teen pregnant or is there a chance that she could become       pregnant during the next month?   No   Has the child received any vaccinations in the past 4 weeks?   No               Immunization questionnaire was positive for at least one answer.  Notified Dr. Milian.      Patient instructed to remain in clinic for 15 minutes afterwards, and to report any adverse reactions.     Screening performed by Janeen Shipley CMA on 6/6/2024 at 3:24 PM.  Signed Electronically by: Miguelina Milian MD     You can access the [facility], offered by [facility], by registering with the following website: [email or web address]

## 2024-06-19 NOTE — OCCUPATIONAL THERAPY INITIAL EVALUATION ADULT - FINE MOTOR COORDINATION, RIGHT HAND, MANIPULATE OBJECTS, OT EVAL
LVM with callback number to assist with getting 9/24 office visit with Dr Ramon rescheduled to 9/25 or 9/26 due to providers schedule change.   
normal performance

## 2024-07-03 ENCOUNTER — NON-APPOINTMENT (OUTPATIENT)
Age: 44
End: 2024-07-03

## 2024-07-03 ENCOUNTER — APPOINTMENT (OUTPATIENT)
Dept: INFECTIOUS DISEASE | Facility: CLINIC | Age: 44
End: 2024-07-03
Payer: MEDICAID

## 2024-07-03 ENCOUNTER — LABORATORY RESULT (OUTPATIENT)
Age: 44
End: 2024-07-03

## 2024-07-03 VITALS
HEART RATE: 97 BPM | SYSTOLIC BLOOD PRESSURE: 163 MMHG | BODY MASS INDEX: 35.31 KG/M2 | WEIGHT: 225 LBS | HEIGHT: 67 IN | OXYGEN SATURATION: 98 % | DIASTOLIC BLOOD PRESSURE: 115 MMHG | TEMPERATURE: 98.1 F

## 2024-07-03 DIAGNOSIS — N30.90 CYSTITIS, UNSPECIFIED W/OUT HEMATURIA: ICD-10-CM

## 2024-07-03 PROCEDURE — G2211 COMPLEX E/M VISIT ADD ON: CPT | Mod: NC,1L

## 2024-07-03 PROCEDURE — 99215 OFFICE O/P EST HI 40 MIN: CPT

## 2024-07-03 RX ORDER — LEVOFLOXACIN 250 MG/1
250 TABLET, FILM COATED ORAL DAILY
Qty: 3 | Refills: 0 | Status: ACTIVE | COMMUNITY
Start: 2024-07-03 | End: 1900-01-01

## 2024-07-03 RX ORDER — FLUCONAZOLE 200 MG/1
200 TABLET ORAL
Qty: 14 | Refills: 0 | Status: ACTIVE | COMMUNITY
Start: 2024-07-03 | End: 1900-01-01

## 2024-07-05 LAB
APPEARANCE: ABNORMAL
BILIRUBIN URINE: NEGATIVE
BLOOD URINE: ABNORMAL
C TRACH RRNA SPEC QL NAA+PROBE: NOT DETECTED
COLOR: YELLOW
GLUCOSE QUALITATIVE U: >=1000 MG/DL
KETONES URINE: NEGATIVE MG/DL
LEUKOCYTE ESTERASE URINE: ABNORMAL
N GONORRHOEA RRNA SPEC QL NAA+PROBE: NOT DETECTED
NITRITE URINE: POSITIVE
PH URINE: 6.5
PROTEIN URINE: 30 MG/DL
SOURCE AMPLIFICATION: NORMAL
SPECIFIC GRAVITY URINE: 1.02
UROBILINOGEN URINE: 0.2 MG/DL

## 2024-07-10 NOTE — ED PROVIDER NOTE - NS ED MD DISPO ISOLATION TYPES
Hibiclens/Chlorhexidine    Preventing Infections Before and After - Your Surgery    IMPORTANT INSTRUCTIONS    Please read and follow these instructions carefully. If you are unable to comply with the below instructions your procedure will be cancelled.       Every Night for Three (3) nights before your surgery:  Shower with an antibacterial soap, such as Dial, or the soap provided at your preassessment appointment. A shower is better than a bath for cleaning your skin.  If needed, ask someone to help you reach all areas of your body. Don’t forget to clean your belly button with every shower.    The night before your surgery:   If you lose your Hibiclens/chlorhexidine please contact surgery center or you can purchase it at a local pharmacy  On the night before your surgery, shower with an antibacterial soap, such as Dial, or the soap provided at your preassessment appointment.   With bottle of Hibiclens/Chlorhexidine in hand, turn water off.  Apply Hibiclens antiseptic skin cleanser with a clean, freshly washed washcloth.  Gently apply to your body from chin to toes (except the genital area) and especially the area(s) where your incision(s) will be.  Leave Hibiclens/Chlorhexidine on your skin for at least 20 seconds.    CAUTION: If needed, Hibiclens/chlorhexidine may be used to clean the folds of skin of the legs (such as in the area of the groin) and on your buttocks and hips. However, do not use Hibiclens/Chlorhexidine above the neck or in the genital area (your bottom) or put inside any area of your body.  Turn the water back on and rinse.  Dry gently with a clean, freshly washed towel.  After your shower, do not use any powder, deodorant, perfumes or lotion.  Use clean, freshly washed towels and washcloths every time you shower.  Wear clean, freshly washed pajamas to bed the night before surgery.  Sleep on clean, freshly washed sheets.  Do not allow pets to sleep in your bed with you.        The Morning of your  None

## 2024-07-11 NOTE — PATIENT PROFILE ADULT - FUNCTIONAL ASSESSMENT - DAILY ACTIVITY 3.
Bed in lowest position, wheels locked, appropriate side rails in place/Call bell, personal items and telephone in reach/Instruct patient to call for assistance before getting out of bed or chair/Non-slip footwear when patient is out of bed/[place] to call system/Physically safe environment - no spills, clutter or unnecessary equipment/Purposeful Proactive Rounding/Room/bathroom lighting operational, light cord in reach
3 = A little assistance

## 2024-07-17 NOTE — ED ADULT NURSE NOTE - PRIMARY CARE PROVIDER
Brief Postoperative Note      Patient: Duke Munoz  YOB: 1978  MRN: [id number]    Date of Procedure: 7/17/2024    Pre-Op Diagnosis Codes:     * Right inguinal hernia [K40.90]    Post-Op Diagnosis: Post-Op Diagnosis Codes:     * Right inguinal hernia [K40.90]       Procedure(s):  RIGHT INGUINAL HERNIA REPAIR WITH MESH    Surgeon(s):  Johnny Ramirez MD    Assistant:  Surgical Assistant: Johny Osuna    Anesthesia: Monitor Anesthesia Care    Estimated Blood Loss (mL): Minimal    Complications: None    Specimens:   ID Type Source Tests Collected by Time Destination   1 : [facility] inguinal hernia sac Tissue Hernia Sac SURGICAL PATHOLOGY Johnny Ramirez MD 7/17/2024 1055        Implants:  Implant Name Type Inv. Item Serial No.  Lot No. LRB No. Used Action   MESH SEDRICK [id number] INGUINAL POLYPR SYN FLAT L PORE MFIL - SNA  MESH SEDRICK [id number] INGUINAL POLYPR SYN FLAT L PORE MFIL NA BARD DAVOL-WD [id number] Right 1 Implanted         Drains: * No LDAs found *    Findings:  Infection Present At Time Of Surgery (PATOS) (choose all levels that have infection present):  No infection present  Other Findings: small indirect, medium direct    Electronically signed by Johnny Ramirez MD on 7/17/2024 at 11:18 AM  
md

## 2024-07-26 ENCOUNTER — APPOINTMENT (OUTPATIENT)
Dept: INFECTIOUS DISEASE | Facility: CLINIC | Age: 44
End: 2024-07-26

## 2024-08-19 NOTE — PATIENT PROFILE ADULT. - NS PRO PT REFERRAL QUES 2 YN
Jamal Rodriguez, DO  You; [facility] Staff1 hour ago (2:12 PM)       Cardiac event monitor tracing reviewed.  This appears to be Mobitz type I or wenkebach in the early morning hours/overnight.  Likely vagal in nature with sleeping.  He will need to discuss sleep medicine referral for CHRISTIANO evaluation with Dr. Wu upon her return.  Please forward to her review upon her return.      no

## 2024-08-27 NOTE — DISCHARGE NOTE OB - BREAST MILK PROVIDES PROTECTION AGAINST INFECTION
Rounded on pt. POC reviewed. Pt resting comfortably, awaiting CT and medicated per order.     Statement Selected

## 2024-09-05 NOTE — ED ADULT NURSE NOTE - PAIN: PRESENCE, MLM
Aguila Chinchilla - I met Ms. Rod today and she is establishing with you in November. She was following with hematology for indefinite anticoagulation for recurrent DVT/PE. I sent her a 6m refill, would you be ok taking over Eliquis once prescription is out? She has not seen a PCP in a while and is not UTD on cancer screening, I ordered a screening mammo for now but she wanted to wait to see you to discuss colon CA screening. Let me know if you have any questions or concerns!   Ebony 
complains of pain/discomfort

## 2024-09-09 NOTE — ED ADULT NURSE NOTE - VOIDING
Patient calling with his wife to check the status of forms. Let them know we are just waiting for the provider to sign forms. Once forms have been completed and faxed with confirmation we will send a [employer] message to inform them. Patient expressed understanding.    dysuric

## 2024-09-13 ENCOUNTER — LABORATORY RESULT (OUTPATIENT)
Age: 44
End: 2024-09-13

## 2024-09-13 ENCOUNTER — APPOINTMENT (OUTPATIENT)
Dept: INFECTIOUS DISEASE | Facility: CLINIC | Age: 44
End: 2024-09-13
Payer: MEDICAID

## 2024-09-13 VITALS
SYSTOLIC BLOOD PRESSURE: 174 MMHG | HEART RATE: 97 BPM | WEIGHT: 230 LBS | DIASTOLIC BLOOD PRESSURE: 102 MMHG | BODY MASS INDEX: 36.1 KG/M2 | HEIGHT: 67 IN

## 2024-09-13 DIAGNOSIS — N30.90 CYSTITIS, UNSPECIFIED W/OUT HEMATURIA: ICD-10-CM

## 2024-09-13 DIAGNOSIS — L21.9 SEBORRHEIC DERMATITIS, UNSPECIFIED: ICD-10-CM

## 2024-09-13 PROCEDURE — 99214 OFFICE O/P EST MOD 30 MIN: CPT

## 2024-09-13 NOTE — HISTORY OF PRESENT ILLNESS
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 Most recent relevant admission was in 1/2024 for hyperglycemia, also found to have erythema/pruritius in groin (which she reports was present for >4 weeks), likely severe intertrigo, treated with 14 day course of fluconazole 400mg daily as well as nystatin powder/clotrimazole cream. She reports having improvement with this regimen. Received a short course of ertapenem/linezolid as well as patient was extremely worried about recurrent nec fasc, however there were no signs of this clinically or on CT torso/lower extremities or on surgery's exam.  Then seen in ID office 7/2024 for recurrent intertrigo and discomfort with urination. No systemic symptoms. UA was inflammatory and UCx grew >100k CFU E.coli and 50-99k CFU P.mirabilis. She was treated with levaquin 250mg PO daily x 3 days (based on prior UCx results) and Fluconazole 400mg x 7 days (for intertrigo and ?vaginal candidiasis) - due to reported poor effect from topicals and lower dose fluconazole regimens.  Today she presents with complaint of mild peeling/scaling skin around face/eyelashes, and also ongoing "pressure" like sensation with urination and a sweet smell of urine.

## 2024-09-13 NOTE — PHYSICAL EXAM
[General Appearance - Alert] : alert [General Appearance - In No Acute Distress] : in no acute distress [Neck Appearance] : the appearance of the neck was normal [] : no respiratory distress [Heart Rate And Rhythm] : heart rate was normal and rhythm regular [Abdomen Soft] : soft [Abdomen Tenderness] : non-tender [Musculoskeletal - Swelling] : no joint swelling [Oriented To Time, Place, And Person] : oriented to person, place, and time [FreeTextEntry1] : Mild peeling skin around eyelids, dandruff

## 2024-09-13 NOTE — ASSESSMENT
[FreeTextEntry1] : # Possible mild cystitis - Favor sx of "pressure with urination" unrelated to any UTI, this is a chronic sx which she relates to her hernia. Complaint of sweet smell of urine likely due to glucosuria with uncontrolled DM. However patient is adamant that she has a UTI and wants UA/UCx checked. I re-emphasized education about ASB, but will check urine per pt adamant request.   # Mild intertrigo - Advised patient to continue use of her nystatin powder  # Seborrheic dermatitis - affecting face/scalp. Likely in part due to uncontrolled DM - Referral placed to derm  RTC PRN

## 2024-09-17 ENCOUNTER — NON-APPOINTMENT (OUTPATIENT)
Age: 44
End: 2024-09-17

## 2024-09-17 LAB
APPEARANCE: CLEAR
BILIRUBIN URINE: NEGATIVE
BLOOD URINE: ABNORMAL
COLOR: YELLOW
GLUCOSE QUALITATIVE U: 500 MG/DL
KETONES URINE: NEGATIVE MG/DL
LEUKOCYTE ESTERASE URINE: ABNORMAL
NITRITE URINE: NEGATIVE
PH URINE: 6
PROTEIN URINE: 30 MG/DL
SPECIFIC GRAVITY URINE: 1.03
UROBILINOGEN URINE: 0.2 MG/DL

## 2024-09-27 NOTE — ED PROVIDER NOTE - HEME/LYMPH NEGATIVE STATEMENT, MLM
[FreeTextEntry1] : My impression is that the patient suffers from a newly diagnosed Glioblastoma. His MRI brain w/wo from today 9/23/24 shows evidence of stable treatment changes. His KPS is 90. I had a long discussion with the patient regarding the role of continuing with SOC. The patient was extensively educated about the nature of his disease process. Therapeutic and diagnostic tests include MRI brain w/wo in  (Nov 2024) and blood work which will be completed this week. The patient should continue to see Dr. Myles and Dr. Wernicke. I will see the patient back in November to review his post chemoRT scan and check progress.  no anemia, no easy bruising, no jaundice, no swollen lymph nodes. I have personally seen and examined this patient.  I have fully participated in the care of this patient. I have reviewed all pertinent clinical information, including history, physical exam, plan and the Resident’s note and agree except as noted.

## 2024-11-17 NOTE — PATIENT PROFILE ADULT - STATED REASON FOR ADMISSION
[]Observation  Telemetry: [x]Yes / []No  PT/OT: [x]Yes / []No    DVT Prophylaxis: [] Lovenox / [] Heparin / [] SCDs / [] Already on Systemic Anticoagulation / [x] fondaparinux    Expected discharge date: 3-4 days  Disposition: Home     Code status: Full Code     ===================================================================    Chief Complaint: Fever, chills, vomiting  Subjective (past 24 hours): Patient was seen this morning at the bedside.  No acute events overnight.  ROS negative.    HPI / Hospital Course:  Per HPI: Srikanth Coy is a 32 y.o. male with a PMH of chronic paraplegia w/ suprapubic catheter and colostomy, depression, insomnia, muscle spasms, chronic pain who presented with subjective fevers, chills, and vomiting x 1 week.  Progressively getting worse.  Having increasing fatigue and overall weakness.  Patient has chronic paraplegia from T4 down with no feeling below that level.  States his suprapubic catheter got clogged and was urinating from his penis, but unable to endorse whether there was dysuria or not.  Denies hematuria.  Says he was having some increased output from colostomy but not real significant.  Denies any recent antibiotic use.  Patient noted to have sepsis and UTI in ED, admitted for further management.     Medications:    Infusion Medications    sodium chloride Stopped (11/15/24 1636)    Scheduled Medications    cefTRIAXone (ROCEPHIN) IV  2,000 mg IntraVENous Q24H    fondaparinux  2.5 mg SubCUTAneous Q24H    sodium hypochlorite   Irrigation Daily    oxymetazoline  2 spray Each Nostril BID    busPIRone  10 mg Oral TID    gabapentin  800 mg Oral 4x Daily    trospium  20 mg Oral BID AC    pantoprazole  40 mg Oral QAM AC    [Held by provider] potassium chloride  10 mEq Oral Daily    venlafaxine  150 mg Oral Daily with breakfast    venlafaxine  75 mg Oral Daily with breakfast    sodium chloride flush  5-40 mL IntraVENous 2 times per day    PRN Meds: acetaminophen **OR**  acetaminophen, sodium hypochlorite, cyclobenzaprine, tiZANidine, sodium chloride flush, sodium chloride, potassium chloride **OR** potassium alternative oral replacement **OR** potassium chloride, magnesium sulfate, ondansetron **OR** ondansetron, polyethylene glycol    Exam:  /80   Pulse 84   Temp 98.2 °F (36.8 °C) (Oral)   Resp 15   Ht 1.803 m (5' 10.98\")   Wt 81.5 kg (179 lb 10.8 oz)   SpO2 97%   BMI 25.07 kg/m²   General: No distress, appears stated age.  Eyes:  PERRL. Conjunctivae/corneas clear.  HENT: Head normal appearing. Nares normal. Oral mucosa moist.  Hearing intact.   Neck: Supple, with full range of motion. Trachea midline.  No gross JVD appreciated.  Respiratory:  Normal effort.  Left lower lobe crackles  Cardiovascular: Normal rate, regular rhythm with normal S1/S2 without murmurs.    No lower extremity edema.   Abdomen: Soft, non-tender, non-distended with normal bowel sounds.  Suprapubic catheter and colostomy in place.  Musculoskeletal: No joint swelling or tenderness.  No movement of lower extremities in the setting of chronic paraplegia.  Skin: Warm and dry. No rashes or lesions.  Neurologic:  No focal sensory/motor deficits in the upper extremities.  No movement of lower extremities in the setting of chronic paraplegia.  Psychiatric: Alert and oriented, normal insight and thought content.   Capillary Refill: Brisk,< 3 seconds.  Peripheral Pulses: +2 palpable, equal bilaterally.       Labs/Radiology: See chart or assessment above.     Electronically signed by Tommy Almonte III, DO on 11/17/2024 at 3:11 PM  Case was discussed with Attending, Dr. Wisdom.     Infection/ hyperglycemia

## 2024-11-20 ENCOUNTER — APPOINTMENT (OUTPATIENT)
Dept: ENDOCRINOLOGY | Facility: CLINIC | Age: 44
End: 2024-11-20
Payer: MEDICAID

## 2024-11-20 VITALS
DIASTOLIC BLOOD PRESSURE: 86 MMHG | HEART RATE: 94 BPM | BODY MASS INDEX: 38.37 KG/M2 | WEIGHT: 245 LBS | SYSTOLIC BLOOD PRESSURE: 173 MMHG

## 2024-11-20 DIAGNOSIS — Z79.4 TYPE 2 DIABETES MELLITUS WITH HYPERGLYCEMIA: ICD-10-CM

## 2024-11-20 DIAGNOSIS — E11.65 TYPE 2 DIABETES MELLITUS WITH HYPERGLYCEMIA: ICD-10-CM

## 2024-11-20 PROCEDURE — 99215 OFFICE O/P EST HI 40 MIN: CPT

## 2024-11-20 PROCEDURE — 82962 GLUCOSE BLOOD TEST: CPT

## 2024-11-20 PROCEDURE — 83036 HEMOGLOBIN GLYCOSYLATED A1C: CPT | Mod: QW

## 2024-11-20 PROCEDURE — G2211 COMPLEX E/M VISIT ADD ON: CPT | Mod: NC

## 2024-11-20 RX ORDER — INSULIN DEGLUDEC INJECTION 100 U/ML
100 INJECTION, SOLUTION SUBCUTANEOUS
Qty: 2 | Refills: 1 | Status: ACTIVE | COMMUNITY
Start: 2024-11-20 | End: 1900-01-01

## 2024-11-29 LAB
ALBUMIN SERPL ELPH-MCNC: 3.6 G/DL
ALP BLD-CCNC: 123 U/L
ALT SERPL-CCNC: 13 U/L
ANION GAP SERPL CALC-SCNC: 14 MMOL/L
AST SERPL-CCNC: 20 U/L
BILIRUB SERPL-MCNC: <0.2 MG/DL
BUN SERPL-MCNC: 12 MG/DL
C PEPTIDE SERPL-MCNC: 2.3 NG/ML
CALCIUM SERPL-MCNC: 8.8 MG/DL
CHLORIDE SERPL-SCNC: 106 MMOL/L
CHOLEST SERPL-MCNC: 228 MG/DL
CO2 SERPL-SCNC: 22 MMOL/L
CREAT SERPL-MCNC: 0.59 MG/DL
CREAT SPEC-SCNC: 137 MG/DL
EGFR: 114 ML/MIN/1.73M2
ESTIMATED AVERAGE GLUCOSE: 315 MG/DL
GAD65 AB SER-MCNC: 0 NMOL/L
GLUCOSE BLDC GLUCOMTR-MCNC: 143
GLUCOSE SERPL-MCNC: 143 MG/DL
HBA1C MFR BLD HPLC: 12.5
HBA1C MFR BLD HPLC: 12.6 %
HCT VFR BLD CALC: 38.1 %
HDLC SERPL-MCNC: 43 MG/DL
HGB BLD-MCNC: 11 G/DL
LDLC SERPL CALC-MCNC: 164 MG/DL
MCHC RBC-ENTMCNC: 22.5 PG
MCHC RBC-ENTMCNC: 28.9 G/DL
MCV RBC AUTO: 77.9 FL
MICROALBUMIN 24H UR DL<=1MG/L-MCNC: 3.4 MG/DL
MICROALBUMIN/CREAT 24H UR-RTO: 25 MG/G
NONHDLC SERPL-MCNC: 185 MG/DL
PLATELET # BLD AUTO: 348 K/UL
POTASSIUM SERPL-SCNC: 3.3 MMOL/L
PROT SERPL-MCNC: 6.4 G/DL
RBC # BLD: 4.89 M/UL
RBC # FLD: 17 %
SODIUM SERPL-SCNC: 143 MMOL/L
TRIGL SERPL-MCNC: 120 MG/DL
TSH SERPL-ACNC: 0.8 UIU/ML
WBC # FLD AUTO: 8.33 K/UL

## 2024-11-29 NOTE — PHYSICAL THERAPY INITIAL EVALUATION ADULT - GENERAL OBSERVATIONS, REHAB EVAL
[facility] ED  Emergency Department Encounter  Emergency Medicine Resident     Pt Name:Yasmine Morrison  MRN: [id number]  Birthdate 1943  Date of evaluation: 11/29/24  PCP:  Sravani Wu DO  Note Started: 5:14 PM EST      CHIEF COMPLAINT       Chief Complaint   Patient presents with    Urinary Tract Infection     Altered Mental Status       HISTORY OF PRESENT ILLNESS  (Location/Symptom, Timing/Onset, Context/Setting, Quality, Duration, Modifying Factors, Severity.)      Yasmine Morrison is a 81-year-old female that presents to the ED for worsening/persistent altered mentation and lethargy per family members.  They are also concerned that the patient is having a worsening UTI.  Patient was seen yesterday and was diagnosed with a UTI upon discharge. There was confusion regarding CODE STATUS as patient was initially sent in with concerns for a possible stroke.  Patient was marked as DNR CC; as such, limited workup was done at that time.  Per family members, patient has baseline dementia and Parkinson's and is typically more interactive more than she is currently.  They state she is more confused and not as energetic.      Patient and family members recently moved from [place] to [place], which may have contributed to the confusion about the nuances of CODE STATUS between states.    Patient states that everything hurts.  She is unable to differentiate what symptoms she is feeling on ROS.    PAST MEDICAL / SURGICAL / SOCIAL / FAMILY HISTORY      has a past medical history of Anxiety, Convulsion (HCC), Dementia (HCC), Encephalopathy, Herpes, Hx of blood clots, Hyperlipidemia, Left bundle branch block, MRSA (methicillin resistant Staphylococcus aureus), Parkinson's disease (HCC), Seizures (HCC), Under care of service provider, and Vitamin D deficiency.       has a past surgical history that includes Abscess Drainage (Right, 12/14/2013); Ureter stent placement (Left, 05/16/2022); Cystoscopy (Left,  Pt received semi-supine in bed in no acute distress +EKG +Heplock +Supraprubic cath, VANI lepe extremities.  Cranial nerves grossly intact.     Concern for: Altered mentation, worsening UTI, hyperammonemia, electrolyte derangement, KATE, anemia, pneumonia, dehydration, intracranial abnormality/bleed     [AS]   1714 Plan/Impression: Will plan for altered mentation workup with CTA head and neck as well as Noncon CT head.  Will obtain [place] x-rays of chest and abdomen.  Will also check TSH, ammonia as well as CMP.  Patient will likely to be admitted and given a dose of IV antibiotics.  Patient will likely need placement to help with care.  Patient's CODE STATUS changed to DNR CCA after talking with daughter, who would like interventions done but does not want intubation or chest compressions performed   [AS]   1800 EKG shows normal sinus rhythm with no ST elevations or or T wave inversions.  There is a left bundle branch block that was present on EKG from December 2022. [AS]   1826 WBC: 7.2  CBC shows no acute leukocytosis.  Hemoglobin within reference range.  No indication of vitamin deficiency based on MCV [AS]   1826 Lactic Acid, Whole Blood(!): 2.8  Moderately elevated lactic acidosis of 2.8.  Will correlate with additional lab work.  Patient may be dehydrated versus an infectious process [AS]   1826 Will recheck urine.  Will place Dawson catheter [AS]   1852 Ammonia: 24  No hyperammonemia. [AS]   1855 CXR Final IMPRESSION:  1. No acute cardiopulmonary process.  2. Incidentally seen are air distended loops of small bowel in the upper  abdomen.   [AS]   1855 Sodium: 139  CMP shows no acute electrolyte during.  Potassium has moderately decreased at 3.1.  Will give replacements.  Creatinine is also 1.3.  Patient has an KATE.  Alkaline phosphatase elevated but below patient's baseline range.    No indication of transaminitis/hepatitis or hepatobiliary obstruction [AS]   1857 TSH, 3rd Generation: 1.04 [AS]   1857 Troponin, High Sensitivity(!!): 61  Mildly above patient's baseline range.  Will wait for CTA head

## 2024-12-10 NOTE — ED ADULT TRIAGE NOTE - CHIEF COMPLAINT QUOTE
Called and spoke with Ronald. Dr Handley had a cancellation on 12/23/2024. Ronald's cataract surgery left eye will be moved to 12/23/2024. Ronald expressed understanding.    patient is 18 weeks pregagnt  complains of nausea vomiting and urinary symptoms. patient is diabetic patient is 18 weeks pregagnt  complains of nausea vomiting and urinary symptoms. patient is diabetic

## 2024-12-12 ENCOUNTER — APPOINTMENT (OUTPATIENT)
Dept: ENDOCRINOLOGY | Facility: CLINIC | Age: 44
End: 2024-12-12

## 2024-12-18 NOTE — PHYSICAL THERAPY INITIAL EVALUATION ADULT - LEVEL OF INDEPENDENCE: STAIR NEGOTIATION, REHAB EVAL
Ultrasound scheduled, called patient for update with no answer, message left, patient portal message sent. Patient read patient portal message   NA

## 2025-01-06 NOTE — H&P ADULT - BIRTH SEX
[FreeTextEntry3] : Recommendations: Testosterone level is set. He will follow up with a duplex scan of the penis. I spent some time explaining to him that as we age, our sexual desires tend to lessen to a degree, but assured him we will figure out if anything is physically wrong.
[FreeTextEntry3] : Recommendations: Testosterone level is set. He will follow up with a duplex scan of the penis. I spent some time explaining to him that as we age, our sexual desires tend to lessen to a degree, but assured him we will figure out if anything is physically wrong.
Female

## 2025-01-21 ENCOUNTER — TRANSCRIPTION ENCOUNTER (OUTPATIENT)
Age: 45
End: 2025-01-21

## 2025-01-21 DIAGNOSIS — E11.9 TYPE 2 DIABETES MELLITUS W/OUT COMPLICATIONS: ICD-10-CM

## 2025-01-23 ENCOUNTER — APPOINTMENT (OUTPATIENT)
Dept: INFECTIOUS DISEASE | Facility: CLINIC | Age: 45
End: 2025-01-23
Payer: MEDICAID

## 2025-01-23 VITALS
TEMPERATURE: 98.7 F | SYSTOLIC BLOOD PRESSURE: 128 MMHG | DIASTOLIC BLOOD PRESSURE: 71 MMHG | HEART RATE: 115 BPM | OXYGEN SATURATION: 99 %

## 2025-01-23 PROCEDURE — 99214 OFFICE O/P EST MOD 30 MIN: CPT

## 2025-01-23 RX ORDER — NYSTATIN 100000 [USP'U]/G
100000 CREAM TOPICAL
Qty: 1 | Refills: 0 | Status: ACTIVE | COMMUNITY
Start: 2025-01-23 | End: 1900-01-01

## 2025-01-23 RX ORDER — NYSTATIN 100000 [USP'U]/G
100000 POWDER TOPICAL
Qty: 1 | Refills: 0 | Status: ACTIVE | COMMUNITY
Start: 2025-01-23 | End: 1900-01-01

## 2025-01-31 ENCOUNTER — NON-APPOINTMENT (OUTPATIENT)
Age: 45
End: 2025-01-31

## 2025-02-03 ENCOUNTER — APPOINTMENT (OUTPATIENT)
Dept: ENDOCRINOLOGY | Facility: CLINIC | Age: 45
End: 2025-02-03

## 2025-02-08 NOTE — ED PROVIDER NOTE - CROS ED GI ALL NEG
Thank you for coming to our Emergency Department today! It is important to remember that some problems or medical conditions are difficult to diagnose and may not be found or addressed during your Emergency Department visit.     Be sure to follow up with your primary care doctor and review all labs/imaging/tests that were performed during your ER visit with them. Some labs/imaging/tests may be outside of the normal range, and require non-emergent follow-up and/or further investigation/treatment/procedures/testing to help diagnose/exclude/prevent complications or other potentially serious medical conditions that were not discussed or addressed during your ER visit.    Please take all medications as directed. All medications may potentially have side-effects and it is impossible to predict which medications may give you side-effects or what side-effects (if any) they will give you.. If you feel that you are having a negative effect or side-effect of any medication you should immediately stop taking them and seek medical attention. If you feel that you are having a life-threatening reaction call 911.    Return to the ER with any questions/concerns, new/concerning symptoms, worsening or failure to improve.   
negative...

## 2025-02-20 NOTE — PATIENT PROFILE ADULT. - BILL OF RIGHTS/ADMISSION INFORMATION PROVIDED TO:
PROGRESS NOTE


PROGRESS NOTE


DATE OF PROGRESS NOTE:


2/20/25


SUBJECTIVE:


no new complaints


VITAL SIGNS





Vital Signs








  Date Time  Temp Pulse Resp B/P (MAP) Pulse Ox O2 Delivery O2 Flow Rate FiO2


 


2/20/25 11:53 99.0 93 20 128/69 98 Room Air  


 


2/20/25 11:06        21


 


2/20/25 07:48       0.0 








PHYSICAL EXAM:


HEAD/FACE:  No signs of trauma. 


EENT:  No eye pain, no blurred vision, no tearing, no double vision, no ear 

pain, no ear discharge, no nose pain, no nasal congestion, no throat pain, no 

throat swelling, no mouth pain. 


RESPIRATORY:   cough, no orthopnea, no SOB, no stridor, no wheezing. 


CARDIOVASCULAR:  No chest pain, no edema, no palpitations, no syncope. 


GASTROINTESTINAL/ABDOMINAL:   No abdominal pain, no constipation, no diarrhea, 

no nausea, no vomiting. 


GENITOURINARY:  No abnormal discharge, no dysuria, no frequent urination, no 

hematuria. No complaints of pain in the genitals. 


MUSCULOSKELETAL:  No back pain, no gout, no joint pain, no joint swelling, no 

muscle pain, no muscle stiffness, no neck pain. 


INTEGUMENTARY:  No change in color, no change in hair/nails, no dryness, no 

lesion, no lumps, no rash. 


NEUROLOGICAL/PSYCH:  No anxiety, not depressed, no emotional problem, no 

headache, no numbness, no pre-existing deficit, no history of seizures,  no 

tremors, no weakness. 


HEMATOLOGIC/LYMPHATIC:  Not anemic, no history of blood clots, no apparent 

bleeding, no bruising, glands not swollen.


All Systems Negative, Except as Noted.


LABORATORY:





                              Laboratory Result(s)








Test


 2/20/25


03:44


 


White Blood Count


 0.8 K/uL


(4.8-10.8)


 


Red Blood Count


 3.13 MIL/uL


(4.50-6.20)


 


Hemoglobin


 9.4 g/dL


(14.0-18.0)


 


Hematocrit 27.6 % (42-54) 


 


Mean Corpuscular Volume


 88.2 fL


(79-99)


 


Mean Corpuscular Hemoglobin


 30.0 pg


(27.0-33.0)


 


Mean Corpuscular Hemoglobin


Concent 34.1 g/dL


(32.0-36.0)


 


Red Cell Distribution Width


 14.0 %


(11.0-15.5)


 


Platelet Count


 115 K/uL


(130-400)


 


Mean Platelet Volume


 10.0 fL


(7.5-10.5)


 


Immature Granulocyte % (Auto) 0.0 % (0-1) 


 


Neutrophils (%) (Auto)


 3.6 %


(40.0-77.0)


 


Lymphocytes (%) (Auto)


 44.6 %


(21.0-51.0)


 


Monocytes (%) (Auto)


 50.6 %


(3.0-13.0)


 


Eosinophils (%) (Auto)


 0.0 %


(0.0-8.0)


 


Basophils (%) (Auto)


 1.2 %


(0.0-5.0)


 


Neutrophils # (Auto)


 0.0 K/uL


(1.8-7.7)


 


Lymphocytes # (Auto)


 0.4 K/uL


(1.0-4.8)


 


Monocytes # (Auto)


 0.4 K/uL


(0.1-1.0)


 


Eosinophils # (Auto)


 0.00 K/uL


(0.00-0.70)


 


Basophils # (Auto)


 0.01 K/uL


(0.00-0.20)


 


Absolute Immature Granulocyte


(auto 0.00 K/uL


(0-1)


 


Nucleated Red Blood Cells


 0.0 %


(0.0-0.19)


 


Sodium Level


 127 mmol/L


(136-145)


 


Potassium Level


 4.0 mmol/L


(3.5-5.1)


 


Chloride Level


 96 mmol/L


(101-111)


 


Carbon Dioxide Level


 24 mmol/L


(21-32)


 


Blood Urea Nitrogen 5 mg/dL (7-18) 


 


Creatinine


 0.6 mg/dL


(0.5-1.3)


 


Glomerular Filtration Rate


Calc 102 mL/min


(>90)


 


Random Glucose


 94 mg/dL


()


 


Total Calcium


 7.9 mg/dL


(8.5-10.1)


 


Total Bilirubin


 0.6 mg/dL


(0.2-1.0)


 


Aspartate Amino Transf


(AST/SGOT) 20 U/L (10-37) 





 


Alanine Aminotransferase


(ALT/SGPT) 23 U/L (12-78) 





 


Alkaline Phosphatase


 66 U/L


()


 


Total Protein


 7.1 g/dL


(6.0-8.3)


 


Albumin


 2.3 g/dL


(3.5-5.0)








INPATIENT MEDS:





Current Medications








 Medications  Dose


 Ordered  Sig/Lobo  Start Time


 Stop Time Status Last Admin


 


 Albuterol  1 udvial  [id number]  2/17/25 18:00


 3/19/25 17:59  2/20/25 11:04





 


 Albuterol  1 udvial  Q4HPRN  PRN  2/17/25 17:00


 3/19/25 16:59   





 


 Azithromycin  250 ml @ 


 250 mls/hr  Q24H  2/17/25 17:00


 2/22/25 16:59  2/19/25 17:32





 


 Enoxaparin Sodium  40 mg  DAILY  2/18/25 09:00


 3/20/25 08:59  2/18/25 09:54





 


 Hydromorphone HCl  0.5 mg  Q3H3  PRN  2/17/25 22:00


 2/22/25 21:59  2/19/25 18:48





 


 Acetaminophen  650 mg  Q6H  PRN  2/17/25 22:00


 3/19/25 21:59  2/18/25 16:27





 


 Acetaminophen  650 mg  Q6H  PRN  2/17/25 22:00


 3/19/25 21:59   





 


 Ondansetron HCl  4 mg  Q6H  PRN  2/17/25 22:00


 3/19/25 21:59   





 


 Cefepime HCl  2 gm  Q12H  2/18/25 14:00


 2/28/25 13:59  2/20/25 13:31





 


 Fluconazole/


 Sodium Chloride  400 mg  Q24H  2/18/25 14:00


 2/28/25 13:59  2/20/25 13:31





 


 Clotrimazole  10 mg  Q6H  2/19/25 21:00


 3/21/25 20:59  2/20/25 10:24





 


 Potassium Chloride  100 ml @ 


 100 mls/hr  AD  PRN  2/19/25 18:30


 3/21/25 18:29   





 


 Potassium Chloride  20 meq  AD  PRN  2/19/25 18:30


 3/21/25 18:29   





 


 Potassium Chloride  20 meq  AD  PRN  2/19/25 18:30


 3/21/25 18:29  2/19/25 23:10





 


 Al Hydroxide/Mg


 Hydroxide/


 Lidocaine HCl/


 Diphenhydramine


 HCl  TAKE


 (5-10)ML


 SWISH ...  Q6H  PRN  2/19/25 19:00


 3/21/25 18:59  2/20/25 13:32











PROBLEM LIST:  


(1) Pneumonia


ICD Code:  J18.9 - Pneumonia, unspecified organism


(2) Sepsis


ICD Code:  A41.9 - Sepsis, unspecified organism


(3) Immunodeficiency


ICD Code:  D84.9 - Immunodeficiency, unspecified


(4) Maintenance chemotherapy


ICD Code:  Z51.11 - Encounter for antineoplastic chemotherapy


PLAN:


IV antibiotics


 hydrate


 consult oncologyAnd pulmonology


Sepsis


Neutropenic fever


Severe neutropenia, POA


Esophageal CA with Mets


Immunodeficiency


Maintenance chemotherapy 


suspected community acquired pneumonia POA


History of hepatitis 


Hypertension 


Chronic Generalized rash to face and scalp worsening, POA











IZZY REAGAN MD              Feb 20, 2025 16:24 Patient Representative

## 2025-02-26 ENCOUNTER — RX RENEWAL (OUTPATIENT)
Age: 45
End: 2025-02-26

## 2025-02-27 ENCOUNTER — RX RENEWAL (OUTPATIENT)
Age: 45
End: 2025-02-27

## 2025-02-27 RX ORDER — INSULIN DEGLUDEC 100 U/ML
100 INJECTION, SOLUTION SUBCUTANEOUS
Qty: 1 | Refills: 1 | Status: ACTIVE | COMMUNITY
Start: 2025-02-27 | End: 1900-01-01

## 2025-03-07 NOTE — DISCHARGE NOTE OB - CARE PROVIDER_API CALL
Bernard Acuña is scheduled for colonoscopy with Dr Hector on 06-03-25 at [facility]. Patient needs to be NPO after midnight the night before procedure. All surgery instructions were explained to the patient and a surgery letter was also mailed out. MA informed patient that PAT will also be calling to review pre-op instructions and medications. Patient verbalized understanding.  Electronically signed by Ramila Mcallister MA on 3/7/2025 at 2:27 PM    
[facility])  Obstetrics and Gynecology  [address] [id number]  Phone: [phone]  Fax: [phone]  Follow Up Time:

## 2025-03-10 NOTE — PATIENT PROFILE ADULT - NSPROPOAURINARYCATHETER_GEN_A_NUR
You were seen in the ENT Clinic today by Dr. Jiménez. If you have any questions or concerns after your appointment, please contact us (see below)       2.   The following recommendations have been made based upon your appointment today:   -Obtain CT scan at your convenience   -We have sent a prescription to our compounding pharmacy for gentamicin irrigations.              How to Contact Us:  Send a [employer] message to your provider. Our team will respond to you via [employer]. Occasionally, we will need to call you to get further information.  For urgent matters (Monday-Friday), call the ENT Clinic: [phone] and speak with a call center team member - they will route your call appropriately.   If you'd like to speak directly with a nurse, please find our contact information below. We do our best to check voicemail frequently throughout the day, and will work to call you back within 1-2 days. For urgent matters, please use the general clinic phone numbers listed above.     Katheryn SESAY RN  Direct: [phone]  Clarita RAVI LPN  Direct: [phone]         [facility]  Department of Otolaryngology   
yes
16-Jan-2024 18:13

## 2025-04-21 NOTE — H&P ADULT - NSHPRISKHIVSCREEN_GEN_ALL_CORE
chest wall non-tender, breathing is unlabored without accessory muscle use, normal breath sounds Offered and patient declined Action 4: Continue Detail Level: Zone Other Instructions: Patient instructed to apply Clobetasol solution QHS then wash off AM

## 2025-04-23 ENCOUNTER — NON-APPOINTMENT (OUTPATIENT)
Age: 45
End: 2025-04-23

## 2025-04-25 ENCOUNTER — APPOINTMENT (OUTPATIENT)
Facility: CLINIC | Age: 45
End: 2025-04-25
Payer: MEDICAID

## 2025-04-25 VITALS
HEART RATE: 112 BPM | HEIGHT: 67 IN | BODY MASS INDEX: 38.45 KG/M2 | OXYGEN SATURATION: 96 % | SYSTOLIC BLOOD PRESSURE: 151 MMHG | DIASTOLIC BLOOD PRESSURE: 90 MMHG | TEMPERATURE: 97.9 F | WEIGHT: 245 LBS

## 2025-04-25 DIAGNOSIS — L73.9 FOLLICULAR DISORDER, UNSPECIFIED: ICD-10-CM

## 2025-04-25 PROCEDURE — G2211 COMPLEX E/M VISIT ADD ON: CPT | Mod: NC

## 2025-04-25 PROCEDURE — 99214 OFFICE O/P EST MOD 30 MIN: CPT

## 2025-04-25 RX ORDER — MUPIROCIN 20 MG/G
2 OINTMENT TOPICAL 3 TIMES DAILY
Qty: 1 | Refills: 0 | Status: ACTIVE | COMMUNITY
Start: 2025-04-25 | End: 1900-01-01

## 2025-04-25 RX ORDER — FLUCONAZOLE 200 MG/1
200 TABLET ORAL
Qty: 28 | Refills: 0 | Status: ACTIVE | COMMUNITY
Start: 2025-04-25 | End: 1900-01-01

## 2025-05-19 NOTE — ED ADULT NURSE NOTE - OBJECTIVE STATEMENT
H&P reviewed. The patient was examined and there are no changes to the H&P.  
Patient presents to the ED complaining of her blood pressure being high, report seeing lights. Patient is postpartum, had a .

## 2025-07-14 NOTE — ED PROVIDER NOTE - NSICDXFAMILYHX_GEN_ALL_CORE_FT
[facility] - Periop Services  Surgery Department  Operative Note    SUMMARY     Date of Procedure: 7/14/2025     Procedure: Procedure(s) (LRB):  CREATION, AV FISTULA (Left)   For AV graft left upper extremity pros and cons addressed including bleeding infection vascular steal phenomenon pain      Surgeons and Role:     * Oralia Fishman MD - Primary    Assisting Surgeon: None    Pre-Operative Diagnosis: ESRD (end stage renal disease) [N18.6]    Post-Operative Diagnosis: Post-Op Diagnosis Codes:     * ESRD (end stage renal disease) [N18.6]    Anesthesia: General/MAC    Operative Findings (including complications, if any):  Adequate artery adequate vein.  Apparently concern for aspiration on induction per anesthesia    Description of Technical Procedures:  Taken operative suite left upper extremity prepped draped.  Preoperative antibiotics given appropriate time-out performed.  Transverse incision just above the antecubital fossa medial aspect.  Longitudinal incision at the apex of the axilla.  Obtain control of the brachial artery distally in the axillary vein proximally.  We tunneled and passed 4 7 tapered PTFE graft.  Heparinized the patient.  Clamped the artery performed end-to-side anastomosis proxy 3-1/2 mm.  Flushed it secured the sutures.  We next performed end-to-side anastomosis proxy 18 mm to the axillary vein flushed it secured the sutures restored flow had good Doppler signal distal to the arterial anastomosis and good systolic diastolic flow in the axillary vein.    Significant Surgical Tasks Conducted by the Assistant(s), if Applicable:  Skin closure    Estimated Blood Loss (EBL): 20 mL           Implants:   Implant Name Type Inv. Item Serial No.  Lot No. LRB No. Used Action   GRAFT PROPATEN 4-7MM X 45CM - [id number]  GRAFT PROPATEN 4-7MM X 45CM [id number] [employer]  Left 1 Implanted       Specimens:   Specimen (24h ago, onward)      None           * No specimens in log *            Condition: Good    Disposition: PACU - hemodynamically stable.    Attestation: Op Note Attestation: I performed the procedure.    FAMILY HISTORY:  FH: diabetes mellitus, father and mother  FH: hypertension, father and mother

## 2025-07-18 ENCOUNTER — INPATIENT (INPATIENT)
Facility: HOSPITAL | Age: 45
LOS: 3 days | Discharge: HOME | End: 2025-07-22
Attending: SURGERY | Admitting: SURGERY
Payer: MEDICAID

## 2025-07-18 VITALS
OXYGEN SATURATION: 98 % | TEMPERATURE: 99 F | SYSTOLIC BLOOD PRESSURE: 173 MMHG | WEIGHT: 220.02 LBS | DIASTOLIC BLOOD PRESSURE: 80 MMHG | RESPIRATION RATE: 18 BRPM | HEART RATE: 98 BPM | HEIGHT: 65 IN

## 2025-07-18 DIAGNOSIS — Z93.9 ARTIFICIAL OPENING STATUS, UNSPECIFIED: Chronic | ICD-10-CM

## 2025-07-18 DIAGNOSIS — Z98.89 OTHER SPECIFIED POSTPROCEDURAL STATES: Chronic | ICD-10-CM

## 2025-07-18 DIAGNOSIS — Z98.890 OTHER SPECIFIED POSTPROCEDURAL STATES: Chronic | ICD-10-CM

## 2025-07-18 LAB
ADD ON TEST-SPECIMEN IN LAB: SIGNIFICANT CHANGE UP
ANION GAP SERPL CALC-SCNC: 18 MMOL/L — HIGH (ref 5–17)
ANION GAP SERPL CALC-SCNC: 19 MMOL/L — HIGH (ref 5–17)
B-OH-BUTYR SERPL-SCNC: 0.2 MMOL/L — SIGNIFICANT CHANGE UP
BUN SERPL-MCNC: 13 MG/DL — SIGNIFICANT CHANGE UP (ref 7–23)
BUN SERPL-MCNC: 13 MG/DL — SIGNIFICANT CHANGE UP (ref 7–23)
CALCIUM SERPL-MCNC: 10.1 MG/DL — SIGNIFICANT CHANGE UP (ref 8.4–10.5)
CALCIUM SERPL-MCNC: 9.8 MG/DL — SIGNIFICANT CHANGE UP (ref 8.4–10.5)
CHLORIDE SERPL-SCNC: 99 MMOL/L — SIGNIFICANT CHANGE UP (ref 96–108)
CHLORIDE SERPL-SCNC: 99 MMOL/L — SIGNIFICANT CHANGE UP (ref 96–108)
CO2 SERPL-SCNC: 19 MMOL/L — LOW (ref 22–31)
CO2 SERPL-SCNC: 20 MMOL/L — LOW (ref 22–31)
CREAT SERPL-MCNC: 0.7 MG/DL — SIGNIFICANT CHANGE UP (ref 0.5–1.3)
CREAT SERPL-MCNC: 0.74 MG/DL — SIGNIFICANT CHANGE UP (ref 0.5–1.3)
EGFR: 102 ML/MIN/1.73M2 — SIGNIFICANT CHANGE UP
EGFR: 102 ML/MIN/1.73M2 — SIGNIFICANT CHANGE UP
EGFR: 109 ML/MIN/1.73M2 — SIGNIFICANT CHANGE UP
EGFR: 109 ML/MIN/1.73M2 — SIGNIFICANT CHANGE UP
GLUCOSE SERPL-MCNC: 82 MG/DL — SIGNIFICANT CHANGE UP (ref 70–99)
GLUCOSE SERPL-MCNC: 85 MG/DL — SIGNIFICANT CHANGE UP (ref 70–99)
HCG SERPL-ACNC: <1 MIU/ML — SIGNIFICANT CHANGE UP
LACTATE SERPL-SCNC: 1.1 MMOL/L — SIGNIFICANT CHANGE UP (ref 0.5–2)
LACTATE SERPL-SCNC: 1.6 MMOL/L — SIGNIFICANT CHANGE UP (ref 0.5–2)
LACTATE SERPL-SCNC: 5 MMOL/L — CRITICAL HIGH (ref 0.5–2)
LIDOCAIN IGE QN: 18 U/L — SIGNIFICANT CHANGE UP (ref 7–60)
MAGNESIUM SERPL-MCNC: 1.7 MG/DL — SIGNIFICANT CHANGE UP (ref 1.6–2.6)
NT-PROBNP SERPL-SCNC: 170 PG/ML — SIGNIFICANT CHANGE UP (ref 0–300)
POTASSIUM SERPL-MCNC: 3.1 MMOL/L — LOW (ref 3.5–5.3)
POTASSIUM SERPL-MCNC: 3.2 MMOL/L — LOW (ref 3.5–5.3)
POTASSIUM SERPL-SCNC: 3.1 MMOL/L — LOW (ref 3.5–5.3)
POTASSIUM SERPL-SCNC: 3.2 MMOL/L — LOW (ref 3.5–5.3)
SODIUM SERPL-SCNC: 136 MMOL/L — SIGNIFICANT CHANGE UP (ref 135–145)
SODIUM SERPL-SCNC: 138 MMOL/L — SIGNIFICANT CHANGE UP (ref 135–145)
TROPONIN T, HIGH SENSITIVITY RESULT: 15 NG/L — SIGNIFICANT CHANGE UP (ref 0–51)

## 2025-07-18 PROCEDURE — 99223 1ST HOSP IP/OBS HIGH 75: CPT

## 2025-07-18 PROCEDURE — 85025 COMPLETE CBC W/AUTO DIFF WBC: CPT

## 2025-07-18 PROCEDURE — 99285 EMERGENCY DEPT VISIT HI MDM: CPT

## 2025-07-18 PROCEDURE — 83690 ASSAY OF LIPASE: CPT

## 2025-07-18 PROCEDURE — 82962 GLUCOSE BLOOD TEST: CPT

## 2025-07-18 PROCEDURE — 99255 IP/OBS CONSLTJ NEW/EST HI 80: CPT

## 2025-07-18 PROCEDURE — 86140 C-REACTIVE PROTEIN: CPT

## 2025-07-18 PROCEDURE — 36415 COLL VENOUS BLD VENIPUNCTURE: CPT

## 2025-07-18 PROCEDURE — 83735 ASSAY OF MAGNESIUM: CPT

## 2025-07-18 PROCEDURE — 83605 ASSAY OF LACTIC ACID: CPT

## 2025-07-18 PROCEDURE — 84702 CHORIONIC GONADOTROPIN TEST: CPT

## 2025-07-18 PROCEDURE — 82010 KETONE BODYS QUAN: CPT

## 2025-07-18 PROCEDURE — 73700 CT LOWER EXTREMITY W/O DYE: CPT | Mod: 26,RT

## 2025-07-18 PROCEDURE — 83880 ASSAY OF NATRIURETIC PEPTIDE: CPT

## 2025-07-18 PROCEDURE — 87040 BLOOD CULTURE FOR BACTERIA: CPT

## 2025-07-18 PROCEDURE — 80048 BASIC METABOLIC PNL TOTAL CA: CPT

## 2025-07-18 PROCEDURE — 84484 ASSAY OF TROPONIN QUANT: CPT

## 2025-07-18 RX ORDER — DEXTROSE 50 % IN WATER 50 %
25 SYRINGE (ML) INTRAVENOUS ONCE
Refills: 0 | Status: DISCONTINUED | OUTPATIENT
Start: 2025-07-18 | End: 2025-07-22

## 2025-07-18 RX ORDER — KETOROLAC TROMETHAMINE 30 MG/ML
15 INJECTION, SOLUTION INTRAMUSCULAR; INTRAVENOUS ONCE
Refills: 0 | Status: DISCONTINUED | OUTPATIENT
Start: 2025-07-18 | End: 2025-07-18

## 2025-07-18 RX ORDER — AMLODIPINE BESYLATE 10 MG/1
1 TABLET ORAL
Refills: 0 | DISCHARGE

## 2025-07-18 RX ORDER — CEFEPIME 2 G/20ML
2000 INJECTION, POWDER, FOR SOLUTION INTRAVENOUS EVERY 8 HOURS
Refills: 0 | Status: DISCONTINUED | OUTPATIENT
Start: 2025-07-18 | End: 2025-07-22

## 2025-07-18 RX ORDER — ACETAMINOPHEN 500 MG/5ML
1000 LIQUID (ML) ORAL ONCE
Refills: 0 | Status: COMPLETED | OUTPATIENT
Start: 2025-07-18 | End: 2025-07-18

## 2025-07-18 RX ORDER — INSULIN LISPRO 100 U/ML
INJECTION, SOLUTION INTRAVENOUS; SUBCUTANEOUS AT BEDTIME
Refills: 0 | Status: DISCONTINUED | OUTPATIENT
Start: 2025-07-18 | End: 2025-07-19

## 2025-07-18 RX ORDER — MEROPENEM 1 G/30ML
1000 INJECTION INTRAVENOUS ONCE
Refills: 0 | Status: COMPLETED | OUTPATIENT
Start: 2025-07-18 | End: 2025-07-18

## 2025-07-18 RX ORDER — METRONIDAZOLE 250 MG
500 TABLET ORAL EVERY 12 HOURS
Refills: 0 | Status: DISCONTINUED | OUTPATIENT
Start: 2025-07-18 | End: 2025-07-22

## 2025-07-18 RX ORDER — ONDANSETRON HCL/PF 4 MG/2 ML
4 VIAL (ML) INJECTION EVERY 6 HOURS
Refills: 0 | Status: DISCONTINUED | OUTPATIENT
Start: 2025-07-18 | End: 2025-07-22

## 2025-07-18 RX ORDER — METOPROLOL SUCCINATE 50 MG/1
1 TABLET, EXTENDED RELEASE ORAL
Refills: 0 | DISCHARGE

## 2025-07-18 RX ORDER — HYDROMORPHONE/SOD CHLOR,ISO/PF 2 MG/10 ML
0.25 SYRINGE (ML) INJECTION ONCE
Refills: 0 | Status: DISCONTINUED | OUTPATIENT
Start: 2025-07-18 | End: 2025-07-18

## 2025-07-18 RX ORDER — LINEZOLID 2 MG/ML
600 INJECTION, SOLUTION INTRAVENOUS EVERY 12 HOURS
Refills: 0 | Status: DISCONTINUED | OUTPATIENT
Start: 2025-07-18 | End: 2025-07-22

## 2025-07-18 RX ORDER — METOPROLOL SUCCINATE 50 MG/1
100 TABLET, EXTENDED RELEASE ORAL DAILY
Refills: 0 | Status: DISCONTINUED | OUTPATIENT
Start: 2025-07-18 | End: 2025-07-22

## 2025-07-18 RX ORDER — DEXTROSE 50 % IN WATER 50 %
15 SYRINGE (ML) INTRAVENOUS ONCE
Refills: 0 | Status: DISCONTINUED | OUTPATIENT
Start: 2025-07-18 | End: 2025-07-22

## 2025-07-18 RX ORDER — ONDANSETRON HCL/PF 4 MG/2 ML
4 VIAL (ML) INJECTION ONCE
Refills: 0 | Status: COMPLETED | OUTPATIENT
Start: 2025-07-18 | End: 2025-07-18

## 2025-07-18 RX ORDER — GLUCAGON 3 MG/1
1 POWDER NASAL ONCE
Refills: 0 | Status: DISCONTINUED | OUTPATIENT
Start: 2025-07-18 | End: 2025-07-22

## 2025-07-18 RX ORDER — SODIUM CHLORIDE 9 G/1000ML
1000 INJECTION, SOLUTION INTRAVENOUS
Refills: 0 | Status: DISCONTINUED | OUTPATIENT
Start: 2025-07-18 | End: 2025-07-22

## 2025-07-18 RX ORDER — DEXTROSE 50 % IN WATER 50 %
12.5 SYRINGE (ML) INTRAVENOUS ONCE
Refills: 0 | Status: DISCONTINUED | OUTPATIENT
Start: 2025-07-18 | End: 2025-07-22

## 2025-07-18 RX ORDER — ALBUTEROL SULFATE 2.5 MG/3ML
0 VIAL, NEBULIZER (ML) INHALATION
Refills: 0 | DISCHARGE

## 2025-07-18 RX ORDER — INSULIN LISPRO 100 U/ML
INJECTION, SOLUTION INTRAVENOUS; SUBCUTANEOUS
Refills: 0 | Status: DISCONTINUED | OUTPATIENT
Start: 2025-07-18 | End: 2025-07-19

## 2025-07-18 RX ORDER — HYDROMORPHONE/SOD CHLOR,ISO/PF 2 MG/10 ML
1 SYRINGE (ML) INJECTION ONCE
Refills: 0 | Status: DISCONTINUED | OUTPATIENT
Start: 2025-07-18 | End: 2025-07-18

## 2025-07-18 RX ORDER — ACETAMINOPHEN 500 MG/5ML
650 LIQUID (ML) ORAL EVERY 6 HOURS
Refills: 0 | Status: DISCONTINUED | OUTPATIENT
Start: 2025-07-18 | End: 2025-07-19

## 2025-07-18 RX ORDER — HEPARIN SODIUM 1000 [USP'U]/ML
7500 INJECTION INTRAVENOUS; SUBCUTANEOUS EVERY 8 HOURS
Refills: 0 | Status: DISCONTINUED | OUTPATIENT
Start: 2025-07-18 | End: 2025-07-22

## 2025-07-18 RX ADMIN — Medication 1 MILLIGRAM(S): at 05:18

## 2025-07-18 RX ADMIN — CEFEPIME 100 MILLIGRAM(S): 2 INJECTION, POWDER, FOR SOLUTION INTRAVENOUS at 23:11

## 2025-07-18 RX ADMIN — Medication 500 MILLIGRAM(S): at 19:00

## 2025-07-18 RX ADMIN — Medication 4 MILLIGRAM(S): at 03:40

## 2025-07-18 RX ADMIN — HEPARIN SODIUM 7500 UNIT(S): 1000 INJECTION INTRAVENOUS; SUBCUTANEOUS at 22:00

## 2025-07-18 RX ADMIN — Medication 1000 MILLIGRAM(S): at 04:00

## 2025-07-18 RX ADMIN — Medication 40 MILLIEQUIVALENT(S): at 06:09

## 2025-07-18 RX ADMIN — Medication 400 MILLIGRAM(S): at 03:40

## 2025-07-18 RX ADMIN — INSULIN LISPRO 8: 100 INJECTION, SOLUTION INTRAVENOUS; SUBCUTANEOUS at 23:15

## 2025-07-18 RX ADMIN — HEPARIN SODIUM 7500 UNIT(S): 1000 INJECTION INTRAVENOUS; SUBCUTANEOUS at 07:30

## 2025-07-18 RX ADMIN — LINEZOLID 600 MILLIGRAM(S): 2 INJECTION, SOLUTION INTRAVENOUS at 19:00

## 2025-07-18 RX ADMIN — KETOROLAC TROMETHAMINE 15 MILLIGRAM(S): 30 INJECTION, SOLUTION INTRAMUSCULAR; INTRAVENOUS at 12:15

## 2025-07-18 RX ADMIN — KETOROLAC TROMETHAMINE 15 MILLIGRAM(S): 30 INJECTION, SOLUTION INTRAMUSCULAR; INTRAVENOUS at 14:10

## 2025-07-18 RX ADMIN — Medication 1 MILLIGRAM(S): at 05:40

## 2025-07-18 RX ADMIN — Medication 1000 MILLILITER(S): at 03:40

## 2025-07-18 RX ADMIN — MEROPENEM 100 MILLIGRAM(S): 1 INJECTION INTRAVENOUS at 05:20

## 2025-07-18 RX ADMIN — MEROPENEM 1000 MILLIGRAM(S): 1 INJECTION INTRAVENOUS at 06:00

## 2025-07-18 RX ADMIN — Medication 4 MILLIGRAM(S): at 19:49

## 2025-07-18 RX ADMIN — Medication 4 MILLIGRAM(S): at 07:37

## 2025-07-18 RX ADMIN — MEROPENEM 100 MILLIGRAM(S): 1 INJECTION INTRAVENOUS at 13:28

## 2025-07-18 RX ADMIN — Medication 1000 MILLILITER(S): at 05:19

## 2025-07-18 RX ADMIN — Medication 0.25 MILLIGRAM(S): at 23:10

## 2025-07-18 RX ADMIN — INSULIN LISPRO 8: 100 INJECTION, SOLUTION INTRAVENOUS; SUBCUTANEOUS at 16:46

## 2025-07-19 LAB
ANION GAP SERPL CALC-SCNC: 13 MMOL/L — SIGNIFICANT CHANGE UP (ref 5–17)
B-OH-BUTYR SERPL-SCNC: 0.1 MMOL/L — SIGNIFICANT CHANGE UP
BUN SERPL-MCNC: 14 MG/DL — SIGNIFICANT CHANGE UP (ref 7–23)
CALCIUM SERPL-MCNC: 8.9 MG/DL — SIGNIFICANT CHANGE UP (ref 8.4–10.5)
CHLORIDE SERPL-SCNC: 98 MMOL/L — SIGNIFICANT CHANGE UP (ref 96–108)
CO2 SERPL-SCNC: 21 MMOL/L — LOW (ref 22–31)
CREAT SERPL-MCNC: 0.69 MG/DL — SIGNIFICANT CHANGE UP (ref 0.5–1.3)
EGFR: 109 ML/MIN/1.73M2 — SIGNIFICANT CHANGE UP
EGFR: 109 ML/MIN/1.73M2 — SIGNIFICANT CHANGE UP
GAS PNL BLDV: SIGNIFICANT CHANGE UP
GLUCOSE SERPL-MCNC: 404 MG/DL — HIGH (ref 70–99)
HCT VFR BLD CALC: 36.4 % — SIGNIFICANT CHANGE UP (ref 34.5–45)
HGB BLD-MCNC: 11.1 G/DL — LOW (ref 11.5–15.5)
LACTATE SERPL-SCNC: 1.6 MMOL/L — SIGNIFICANT CHANGE UP (ref 0.5–2)
LACTATE SERPL-SCNC: 2.3 MMOL/L — HIGH (ref 0.5–2)
MAGNESIUM SERPL-MCNC: 1.7 MG/DL — SIGNIFICANT CHANGE UP (ref 1.6–2.6)
MCHC RBC-ENTMCNC: 22.9 PG — LOW (ref 27–34)
MCHC RBC-ENTMCNC: 30.5 G/DL — LOW (ref 32–36)
MCV RBC AUTO: 75.2 FL — LOW (ref 80–100)
NRBC # BLD AUTO: 0 K/UL — SIGNIFICANT CHANGE UP (ref 0–0)
NRBC # FLD: 0 K/UL — SIGNIFICANT CHANGE UP (ref 0–0)
NRBC BLD AUTO-RTO: 0 /100 WBCS — SIGNIFICANT CHANGE UP (ref 0–0)
PHOSPHATE SERPL-MCNC: 3 MG/DL — SIGNIFICANT CHANGE UP (ref 2.5–4.5)
PLATELET # BLD AUTO: 288 K/UL — SIGNIFICANT CHANGE UP (ref 150–400)
PMV BLD: 9.9 FL — SIGNIFICANT CHANGE UP (ref 7–13)
POTASSIUM SERPL-MCNC: 4.1 MMOL/L — SIGNIFICANT CHANGE UP (ref 3.5–5.3)
POTASSIUM SERPL-SCNC: 4.1 MMOL/L — SIGNIFICANT CHANGE UP (ref 3.5–5.3)
RBC # BLD: 4.84 M/UL — SIGNIFICANT CHANGE UP (ref 3.8–5.2)
RBC # FLD: 16.9 % — HIGH (ref 10.3–14.5)
SODIUM SERPL-SCNC: 132 MMOL/L — LOW (ref 135–145)
WBC # BLD: 6.58 K/UL — SIGNIFICANT CHANGE UP (ref 3.8–10.5)
WBC # FLD AUTO: 6.58 K/UL — SIGNIFICANT CHANGE UP (ref 3.8–10.5)

## 2025-07-19 PROCEDURE — 82330 ASSAY OF CALCIUM: CPT

## 2025-07-19 PROCEDURE — 82962 GLUCOSE BLOOD TEST: CPT

## 2025-07-19 PROCEDURE — 80048 BASIC METABOLIC PNL TOTAL CA: CPT

## 2025-07-19 PROCEDURE — 87040 BLOOD CULTURE FOR BACTERIA: CPT

## 2025-07-19 PROCEDURE — 84484 ASSAY OF TROPONIN QUANT: CPT

## 2025-07-19 PROCEDURE — 83690 ASSAY OF LIPASE: CPT

## 2025-07-19 PROCEDURE — 83880 ASSAY OF NATRIURETIC PEPTIDE: CPT

## 2025-07-19 PROCEDURE — 99223 1ST HOSP IP/OBS HIGH 75: CPT | Mod: GC

## 2025-07-19 PROCEDURE — 82010 KETONE BODYS QUAN: CPT

## 2025-07-19 PROCEDURE — 85027 COMPLETE CBC AUTOMATED: CPT

## 2025-07-19 PROCEDURE — 99232 SBSQ HOSP IP/OBS MODERATE 35: CPT

## 2025-07-19 PROCEDURE — 84132 ASSAY OF SERUM POTASSIUM: CPT

## 2025-07-19 PROCEDURE — 86140 C-REACTIVE PROTEIN: CPT

## 2025-07-19 PROCEDURE — 82803 BLOOD GASES ANY COMBINATION: CPT

## 2025-07-19 PROCEDURE — 99254 IP/OBS CNSLTJ NEW/EST MOD 60: CPT

## 2025-07-19 PROCEDURE — 85025 COMPLETE CBC W/AUTO DIFF WBC: CPT

## 2025-07-19 PROCEDURE — 36415 COLL VENOUS BLD VENIPUNCTURE: CPT

## 2025-07-19 PROCEDURE — 84100 ASSAY OF PHOSPHORUS: CPT

## 2025-07-19 PROCEDURE — 84702 CHORIONIC GONADOTROPIN TEST: CPT

## 2025-07-19 PROCEDURE — 83605 ASSAY OF LACTIC ACID: CPT

## 2025-07-19 PROCEDURE — 84295 ASSAY OF SERUM SODIUM: CPT

## 2025-07-19 PROCEDURE — 83735 ASSAY OF MAGNESIUM: CPT

## 2025-07-19 RX ORDER — ACETAMINOPHEN 500 MG/5ML
1000 LIQUID (ML) ORAL ONCE
Refills: 0 | Status: COMPLETED | OUTPATIENT
Start: 2025-07-19 | End: 2025-07-19

## 2025-07-19 RX ORDER — IBUPROFEN 200 MG
600 TABLET ORAL ONCE
Refills: 0 | Status: COMPLETED | OUTPATIENT
Start: 2025-07-19 | End: 2025-07-19

## 2025-07-19 RX ORDER — DIPHENHYDRAMINE HCL 12.5MG/5ML
25 ELIXIR ORAL ONCE
Refills: 0 | Status: COMPLETED | OUTPATIENT
Start: 2025-07-19 | End: 2025-07-19

## 2025-07-19 RX ORDER — MAGNESIUM SULFATE 500 MG/ML
1 SYRINGE (ML) INJECTION ONCE
Refills: 0 | Status: COMPLETED | OUTPATIENT
Start: 2025-07-19 | End: 2025-07-19

## 2025-07-19 RX ORDER — INSULIN GLARGINE-YFGN 100 [IU]/ML
25 INJECTION, SOLUTION SUBCUTANEOUS EVERY MORNING
Refills: 0 | Status: DISCONTINUED | OUTPATIENT
Start: 2025-07-19 | End: 2025-07-19

## 2025-07-19 RX ORDER — LABETALOL HYDROCHLORIDE 200 MG/1
10 TABLET, FILM COATED ORAL ONCE
Refills: 0 | Status: COMPLETED | OUTPATIENT
Start: 2025-07-19 | End: 2025-07-19

## 2025-07-19 RX ORDER — INSULIN LISPRO 100 U/ML
10 INJECTION, SOLUTION INTRAVENOUS; SUBCUTANEOUS
Refills: 0 | Status: DISCONTINUED | OUTPATIENT
Start: 2025-07-19 | End: 2025-07-20

## 2025-07-19 RX ORDER — HYDROMORPHONE/SOD CHLOR,ISO/PF 2 MG/10 ML
0.2 SYRINGE (ML) INJECTION
Refills: 0 | Status: DISCONTINUED | OUTPATIENT
Start: 2025-07-19 | End: 2025-07-19

## 2025-07-19 RX ORDER — SODIUM CHLORIDE 9 G/1000ML
1000 INJECTION, SOLUTION INTRAVENOUS
Refills: 0 | Status: DISCONTINUED | OUTPATIENT
Start: 2025-07-19 | End: 2025-07-19

## 2025-07-19 RX ORDER — ONDANSETRON HCL/PF 4 MG/2 ML
4 VIAL (ML) INJECTION EVERY 4 HOURS
Refills: 0 | Status: DISCONTINUED | OUTPATIENT
Start: 2025-07-19 | End: 2025-07-19

## 2025-07-19 RX ORDER — OXYCODONE HYDROCHLORIDE 30 MG/1
10 TABLET ORAL EVERY 6 HOURS
Refills: 0 | Status: DISCONTINUED | OUTPATIENT
Start: 2025-07-19 | End: 2025-07-22

## 2025-07-19 RX ORDER — INSULIN LISPRO 100 U/ML
5 INJECTION, SOLUTION INTRAVENOUS; SUBCUTANEOUS ONCE
Refills: 0 | Status: COMPLETED | OUTPATIENT
Start: 2025-07-19 | End: 2025-07-19

## 2025-07-19 RX ORDER — ACETAMINOPHEN 500 MG/5ML
1000 LIQUID (ML) ORAL EVERY 8 HOURS
Refills: 0 | Status: DISCONTINUED | OUTPATIENT
Start: 2025-07-19 | End: 2025-07-22

## 2025-07-19 RX ORDER — INSULIN LISPRO 100 U/ML
INJECTION, SOLUTION INTRAVENOUS; SUBCUTANEOUS
Refills: 0 | Status: DISCONTINUED | OUTPATIENT
Start: 2025-07-19 | End: 2025-07-22

## 2025-07-19 RX ORDER — AMLODIPINE BESYLATE 10 MG/1
5 TABLET ORAL EVERY 24 HOURS
Refills: 0 | Status: DISCONTINUED | OUTPATIENT
Start: 2025-07-19 | End: 2025-07-22

## 2025-07-19 RX ORDER — INSULIN LISPRO 100 U/ML
5 INJECTION, SOLUTION INTRAVENOUS; SUBCUTANEOUS
Refills: 0 | Status: DISCONTINUED | OUTPATIENT
Start: 2025-07-19 | End: 2025-07-19

## 2025-07-19 RX ORDER — INSULIN LISPRO 100 U/ML
INJECTION, SOLUTION INTRAVENOUS; SUBCUTANEOUS AT BEDTIME
Refills: 0 | Status: DISCONTINUED | OUTPATIENT
Start: 2025-07-19 | End: 2025-07-22

## 2025-07-19 RX ORDER — INSULIN GLARGINE-YFGN 100 [IU]/ML
40 INJECTION, SOLUTION SUBCUTANEOUS AT BEDTIME
Refills: 0 | Status: DISCONTINUED | OUTPATIENT
Start: 2025-07-19 | End: 2025-07-20

## 2025-07-19 RX ORDER — HYDROMORPHONE/SOD CHLOR,ISO/PF 2 MG/10 ML
0.5 SYRINGE (ML) INJECTION ONCE
Refills: 0 | Status: DISCONTINUED | OUTPATIENT
Start: 2025-07-19 | End: 2025-07-19

## 2025-07-19 RX ORDER — OXYCODONE HYDROCHLORIDE 30 MG/1
5 TABLET ORAL EVERY 6 HOURS
Refills: 0 | Status: DISCONTINUED | OUTPATIENT
Start: 2025-07-19 | End: 2025-07-22

## 2025-07-19 RX ORDER — HYDROMORPHONE/SOD CHLOR,ISO/PF 2 MG/10 ML
0.5 SYRINGE (ML) INJECTION
Refills: 0 | Status: DISCONTINUED | OUTPATIENT
Start: 2025-07-19 | End: 2025-07-19

## 2025-07-19 RX ADMIN — Medication 0.5 MILLIGRAM(S): at 04:47

## 2025-07-19 RX ADMIN — METOPROLOL SUCCINATE 100 MILLIGRAM(S): 50 TABLET, EXTENDED RELEASE ORAL at 06:27

## 2025-07-19 RX ADMIN — Medication 0.5 MILLIGRAM(S): at 12:55

## 2025-07-19 RX ADMIN — Medication 4 MILLIGRAM(S): at 11:50

## 2025-07-19 RX ADMIN — LINEZOLID 600 MILLIGRAM(S): 2 INJECTION, SOLUTION INTRAVENOUS at 06:28

## 2025-07-19 RX ADMIN — Medication 100 GRAM(S): at 04:52

## 2025-07-19 RX ADMIN — OXYCODONE HYDROCHLORIDE 5 MILLIGRAM(S): 30 TABLET ORAL at 21:54

## 2025-07-19 RX ADMIN — OXYCODONE HYDROCHLORIDE 10 MILLIGRAM(S): 30 TABLET ORAL at 16:15

## 2025-07-19 RX ADMIN — OXYCODONE HYDROCHLORIDE 10 MILLIGRAM(S): 30 TABLET ORAL at 17:00

## 2025-07-19 RX ADMIN — Medication 0.5 MILLIGRAM(S): at 13:30

## 2025-07-19 RX ADMIN — INSULIN LISPRO 5 UNIT(S): 100 INJECTION, SOLUTION INTRAVENOUS; SUBCUTANEOUS at 18:27

## 2025-07-19 RX ADMIN — LINEZOLID 600 MILLIGRAM(S): 2 INJECTION, SOLUTION INTRAVENOUS at 19:16

## 2025-07-19 RX ADMIN — INSULIN GLARGINE-YFGN 25 UNIT(S): 100 INJECTION, SOLUTION SUBCUTANEOUS at 07:50

## 2025-07-19 RX ADMIN — Medication 500 MILLIGRAM(S): at 19:16

## 2025-07-19 RX ADMIN — CEFEPIME 100 MILLIGRAM(S): 2 INJECTION, POWDER, FOR SOLUTION INTRAVENOUS at 23:48

## 2025-07-19 RX ADMIN — CEFEPIME 100 MILLIGRAM(S): 2 INJECTION, POWDER, FOR SOLUTION INTRAVENOUS at 06:29

## 2025-07-19 RX ADMIN — Medication 0.5 MILLIGRAM(S): at 10:30

## 2025-07-19 RX ADMIN — Medication 1000 MILLIGRAM(S): at 21:52

## 2025-07-19 RX ADMIN — Medication 9.8 UNIT(S)/HR: at 04:30

## 2025-07-19 RX ADMIN — HEPARIN SODIUM 7500 UNIT(S): 1000 INJECTION INTRAVENOUS; SUBCUTANEOUS at 21:26

## 2025-07-19 RX ADMIN — Medication 0.5 MILLIGRAM(S): at 03:47

## 2025-07-19 RX ADMIN — LABETALOL HYDROCHLORIDE 10 MILLIGRAM(S): 200 TABLET, FILM COATED ORAL at 20:52

## 2025-07-19 RX ADMIN — INSULIN LISPRO 5 UNIT(S): 100 INJECTION, SOLUTION INTRAVENOUS; SUBCUTANEOUS at 11:54

## 2025-07-19 RX ADMIN — INSULIN LISPRO 4: 100 INJECTION, SOLUTION INTRAVENOUS; SUBCUTANEOUS at 11:53

## 2025-07-19 RX ADMIN — INSULIN LISPRO 2: 100 INJECTION, SOLUTION INTRAVENOUS; SUBCUTANEOUS at 07:49

## 2025-07-19 RX ADMIN — INSULIN LISPRO 2: 100 INJECTION, SOLUTION INTRAVENOUS; SUBCUTANEOUS at 23:47

## 2025-07-19 RX ADMIN — Medication 1000 MILLIGRAM(S): at 21:54

## 2025-07-19 RX ADMIN — OXYCODONE HYDROCHLORIDE 5 MILLIGRAM(S): 30 TABLET ORAL at 21:26

## 2025-07-19 RX ADMIN — CEFEPIME 100 MILLIGRAM(S): 2 INJECTION, POWDER, FOR SOLUTION INTRAVENOUS at 15:37

## 2025-07-19 RX ADMIN — SODIUM CHLORIDE 120 MILLILITER(S): 9 INJECTION, SOLUTION INTRAVENOUS at 03:48

## 2025-07-19 RX ADMIN — AMLODIPINE BESYLATE 5 MILLIGRAM(S): 10 TABLET ORAL at 16:16

## 2025-07-19 RX ADMIN — INSULIN LISPRO 8: 100 INJECTION, SOLUTION INTRAVENOUS; SUBCUTANEOUS at 17:40

## 2025-07-19 RX ADMIN — Medication 0.5 MILLIGRAM(S): at 04:38

## 2025-07-19 RX ADMIN — HEPARIN SODIUM 7500 UNIT(S): 1000 INJECTION INTRAVENOUS; SUBCUTANEOUS at 15:37

## 2025-07-19 RX ADMIN — Medication 400 MILLIGRAM(S): at 03:48

## 2025-07-19 RX ADMIN — Medication 500 MILLIGRAM(S): at 06:28

## 2025-07-19 RX ADMIN — HEPARIN SODIUM 7500 UNIT(S): 1000 INJECTION INTRAVENOUS; SUBCUTANEOUS at 06:28

## 2025-07-19 RX ADMIN — Medication 0.5 MILLIGRAM(S): at 05:38

## 2025-07-19 RX ADMIN — INSULIN GLARGINE-YFGN 40 UNIT(S): 100 INJECTION, SOLUTION SUBCUTANEOUS at 23:47

## 2025-07-19 RX ADMIN — Medication 0.5 MILLIGRAM(S): at 09:37

## 2025-07-19 RX ADMIN — Medication 25 MILLIGRAM(S): at 21:26

## 2025-07-19 RX ADMIN — Medication 1 APPLICATION(S): at 06:28

## 2025-07-19 RX ADMIN — Medication 600 MILLIGRAM(S): at 21:49

## 2025-07-19 RX ADMIN — INSULIN LISPRO 5 UNIT(S): 100 INJECTION, SOLUTION INTRAVENOUS; SUBCUTANEOUS at 17:39

## 2025-07-19 RX ADMIN — Medication 600 MILLIGRAM(S): at 21:54

## 2025-07-20 LAB
A1C WITH ESTIMATED AVERAGE GLUCOSE RESULT: 12.5 % — HIGH (ref 4–5.6)
ANION GAP SERPL CALC-SCNC: 9 MMOL/L — SIGNIFICANT CHANGE UP (ref 5–17)
APPEARANCE UR: CLEAR — SIGNIFICANT CHANGE UP
BILIRUB UR-MCNC: NEGATIVE — SIGNIFICANT CHANGE UP
BUN SERPL-MCNC: 12 MG/DL — SIGNIFICANT CHANGE UP (ref 7–23)
CALCIUM SERPL-MCNC: 8.7 MG/DL — SIGNIFICANT CHANGE UP (ref 8.4–10.5)
CHLORIDE SERPL-SCNC: 104 MMOL/L — SIGNIFICANT CHANGE UP (ref 96–108)
CO2 SERPL-SCNC: 23 MMOL/L — SIGNIFICANT CHANGE UP (ref 22–31)
COLOR SPEC: YELLOW — SIGNIFICANT CHANGE UP
CREAT SERPL-MCNC: 0.72 MG/DL — SIGNIFICANT CHANGE UP (ref 0.5–1.3)
DIFF PNL FLD: NEGATIVE — SIGNIFICANT CHANGE UP
EGFR: 105 ML/MIN/1.73M2 — SIGNIFICANT CHANGE UP
EGFR: 105 ML/MIN/1.73M2 — SIGNIFICANT CHANGE UP
ESTIMATED AVERAGE GLUCOSE: 312 MG/DL — HIGH (ref 68–114)
GLUCOSE SERPL-MCNC: 190 MG/DL — HIGH (ref 70–99)
GLUCOSE UR QL: 500 MG/DL
HCT VFR BLD CALC: 37.2 % — SIGNIFICANT CHANGE UP (ref 34.5–45)
HGB BLD-MCNC: 11.3 G/DL — LOW (ref 11.5–15.5)
KETONES UR QL: ABNORMAL MG/DL
LEUKOCYTE ESTERASE UR-ACNC: NEGATIVE — SIGNIFICANT CHANGE UP
MAGNESIUM SERPL-MCNC: 1.7 MG/DL — SIGNIFICANT CHANGE UP (ref 1.6–2.6)
MCHC RBC-ENTMCNC: 22.6 PG — LOW (ref 27–34)
MCHC RBC-ENTMCNC: 30.4 G/DL — LOW (ref 32–36)
MCV RBC AUTO: 74.5 FL — LOW (ref 80–100)
NITRITE UR-MCNC: NEGATIVE — SIGNIFICANT CHANGE UP
NRBC # BLD AUTO: 0 K/UL — SIGNIFICANT CHANGE UP (ref 0–0)
NRBC # FLD: 0 K/UL — SIGNIFICANT CHANGE UP (ref 0–0)
NRBC BLD AUTO-RTO: 0 /100 WBCS — SIGNIFICANT CHANGE UP (ref 0–0)
PH UR: 5.5 — SIGNIFICANT CHANGE UP (ref 5–8)
PHOSPHATE SERPL-MCNC: 3.6 MG/DL — SIGNIFICANT CHANGE UP (ref 2.5–4.5)
PLATELET # BLD AUTO: 302 K/UL — SIGNIFICANT CHANGE UP (ref 150–400)
PMV BLD: 9.1 FL — SIGNIFICANT CHANGE UP (ref 7–13)
POTASSIUM SERPL-MCNC: 3.8 MMOL/L — SIGNIFICANT CHANGE UP (ref 3.5–5.3)
POTASSIUM SERPL-SCNC: 3.8 MMOL/L — SIGNIFICANT CHANGE UP (ref 3.5–5.3)
PROT UR-MCNC: 30 MG/DL
RBC # BLD: 4.99 M/UL — SIGNIFICANT CHANGE UP (ref 3.8–5.2)
RBC # FLD: 16.8 % — HIGH (ref 10.3–14.5)
SODIUM SERPL-SCNC: 136 MMOL/L — SIGNIFICANT CHANGE UP (ref 135–145)
SP GR SPEC: >1.03 — HIGH (ref 1–1.03)
UROBILINOGEN FLD QL: 0.2 MG/DL — SIGNIFICANT CHANGE UP (ref 0.2–1)
WBC # BLD: 7.5 K/UL — SIGNIFICANT CHANGE UP (ref 3.8–10.5)
WBC # FLD AUTO: 7.5 K/UL — SIGNIFICANT CHANGE UP (ref 3.8–10.5)

## 2025-07-20 PROCEDURE — 82330 ASSAY OF CALCIUM: CPT

## 2025-07-20 PROCEDURE — 80048 BASIC METABOLIC PNL TOTAL CA: CPT

## 2025-07-20 PROCEDURE — 86140 C-REACTIVE PROTEIN: CPT

## 2025-07-20 PROCEDURE — 84702 CHORIONIC GONADOTROPIN TEST: CPT

## 2025-07-20 PROCEDURE — 87040 BLOOD CULTURE FOR BACTERIA: CPT

## 2025-07-20 PROCEDURE — 85025 COMPLETE CBC W/AUTO DIFF WBC: CPT

## 2025-07-20 PROCEDURE — 84295 ASSAY OF SERUM SODIUM: CPT

## 2025-07-20 PROCEDURE — 85027 COMPLETE CBC AUTOMATED: CPT

## 2025-07-20 PROCEDURE — 99232 SBSQ HOSP IP/OBS MODERATE 35: CPT

## 2025-07-20 PROCEDURE — 82803 BLOOD GASES ANY COMBINATION: CPT

## 2025-07-20 PROCEDURE — 99255 IP/OBS CONSLTJ NEW/EST HI 80: CPT

## 2025-07-20 PROCEDURE — 82962 GLUCOSE BLOOD TEST: CPT

## 2025-07-20 PROCEDURE — 83036 HEMOGLOBIN GLYCOSYLATED A1C: CPT

## 2025-07-20 PROCEDURE — 83735 ASSAY OF MAGNESIUM: CPT

## 2025-07-20 PROCEDURE — 36415 COLL VENOUS BLD VENIPUNCTURE: CPT

## 2025-07-20 PROCEDURE — 73700 CT LOWER EXTREMITY W/O DYE: CPT

## 2025-07-20 PROCEDURE — 93970 EXTREMITY STUDY: CPT | Mod: 26

## 2025-07-20 PROCEDURE — 81001 URINALYSIS AUTO W/SCOPE: CPT

## 2025-07-20 PROCEDURE — 84100 ASSAY OF PHOSPHORUS: CPT

## 2025-07-20 PROCEDURE — 84484 ASSAY OF TROPONIN QUANT: CPT

## 2025-07-20 PROCEDURE — 83880 ASSAY OF NATRIURETIC PEPTIDE: CPT

## 2025-07-20 PROCEDURE — 83690 ASSAY OF LIPASE: CPT

## 2025-07-20 PROCEDURE — 83605 ASSAY OF LACTIC ACID: CPT

## 2025-07-20 PROCEDURE — 82010 KETONE BODYS QUAN: CPT

## 2025-07-20 PROCEDURE — 84132 ASSAY OF SERUM POTASSIUM: CPT

## 2025-07-20 RX ORDER — INSULIN GLARGINE-YFGN 100 [IU]/ML
40 INJECTION, SOLUTION SUBCUTANEOUS AT BEDTIME
Refills: 0 | Status: DISCONTINUED | OUTPATIENT
Start: 2025-07-20 | End: 2025-07-20

## 2025-07-20 RX ORDER — INSULIN LISPRO 100 U/ML
16 INJECTION, SOLUTION INTRAVENOUS; SUBCUTANEOUS
Refills: 0 | Status: DISCONTINUED | OUTPATIENT
Start: 2025-07-20 | End: 2025-07-21

## 2025-07-20 RX ORDER — ONDANSETRON HCL/PF 4 MG/2 ML
4 VIAL (ML) INJECTION ONCE
Refills: 0 | Status: COMPLETED | OUTPATIENT
Start: 2025-07-20 | End: 2025-07-20

## 2025-07-20 RX ORDER — INSULIN GLARGINE-YFGN 100 [IU]/ML
50 INJECTION, SOLUTION SUBCUTANEOUS AT BEDTIME
Refills: 0 | Status: DISCONTINUED | OUTPATIENT
Start: 2025-07-20 | End: 2025-07-20

## 2025-07-20 RX ORDER — INSULIN GLARGINE-YFGN 100 [IU]/ML
50 INJECTION, SOLUTION SUBCUTANEOUS AT BEDTIME
Refills: 0 | Status: DISCONTINUED | OUTPATIENT
Start: 2025-07-20 | End: 2025-07-21

## 2025-07-20 RX ORDER — INSULIN LISPRO 100 U/ML
12 INJECTION, SOLUTION INTRAVENOUS; SUBCUTANEOUS
Refills: 0 | Status: DISCONTINUED | OUTPATIENT
Start: 2025-07-20 | End: 2025-07-20

## 2025-07-20 RX ORDER — INSULIN LISPRO 100 U/ML
6 INJECTION, SOLUTION INTRAVENOUS; SUBCUTANEOUS ONCE
Refills: 0 | Status: COMPLETED | OUTPATIENT
Start: 2025-07-20 | End: 2025-07-20

## 2025-07-20 RX ORDER — POLYETHYLENE GLYCOL 3350 17 G/17G
17 POWDER, FOR SOLUTION ORAL DAILY
Refills: 0 | Status: DISCONTINUED | OUTPATIENT
Start: 2025-07-20 | End: 2025-07-22

## 2025-07-20 RX ADMIN — OXYCODONE HYDROCHLORIDE 10 MILLIGRAM(S): 30 TABLET ORAL at 17:03

## 2025-07-20 RX ADMIN — LINEZOLID 600 MILLIGRAM(S): 2 INJECTION, SOLUTION INTRAVENOUS at 18:48

## 2025-07-20 RX ADMIN — INSULIN LISPRO 16 UNIT(S): 100 INJECTION, SOLUTION INTRAVENOUS; SUBCUTANEOUS at 11:50

## 2025-07-20 RX ADMIN — OXYCODONE HYDROCHLORIDE 5 MILLIGRAM(S): 30 TABLET ORAL at 12:26

## 2025-07-20 RX ADMIN — AMLODIPINE BESYLATE 5 MILLIGRAM(S): 10 TABLET ORAL at 16:59

## 2025-07-20 RX ADMIN — Medication 4 MILLIGRAM(S): at 14:02

## 2025-07-20 RX ADMIN — LINEZOLID 600 MILLIGRAM(S): 2 INJECTION, SOLUTION INTRAVENOUS at 06:18

## 2025-07-20 RX ADMIN — INSULIN LISPRO 10 UNIT(S): 100 INJECTION, SOLUTION INTRAVENOUS; SUBCUTANEOUS at 07:13

## 2025-07-20 RX ADMIN — OXYCODONE HYDROCHLORIDE 10 MILLIGRAM(S): 30 TABLET ORAL at 08:36

## 2025-07-20 RX ADMIN — CEFEPIME 100 MILLIGRAM(S): 2 INJECTION, POWDER, FOR SOLUTION INTRAVENOUS at 14:09

## 2025-07-20 RX ADMIN — OXYCODONE HYDROCHLORIDE 5 MILLIGRAM(S): 30 TABLET ORAL at 13:30

## 2025-07-20 RX ADMIN — CEFEPIME 100 MILLIGRAM(S): 2 INJECTION, POWDER, FOR SOLUTION INTRAVENOUS at 22:28

## 2025-07-20 RX ADMIN — Medication 1 APPLICATION(S): at 05:52

## 2025-07-20 RX ADMIN — HEPARIN SODIUM 7500 UNIT(S): 1000 INJECTION INTRAVENOUS; SUBCUTANEOUS at 06:17

## 2025-07-20 RX ADMIN — Medication 500 MILLIGRAM(S): at 06:19

## 2025-07-20 RX ADMIN — INSULIN LISPRO 16 UNIT(S): 100 INJECTION, SOLUTION INTRAVENOUS; SUBCUTANEOUS at 18:52

## 2025-07-20 RX ADMIN — CEFEPIME 100 MILLIGRAM(S): 2 INJECTION, POWDER, FOR SOLUTION INTRAVENOUS at 06:19

## 2025-07-20 RX ADMIN — INSULIN GLARGINE-YFGN 50 UNIT(S): 100 INJECTION, SOLUTION SUBCUTANEOUS at 22:34

## 2025-07-20 RX ADMIN — HEPARIN SODIUM 7500 UNIT(S): 1000 INJECTION INTRAVENOUS; SUBCUTANEOUS at 14:04

## 2025-07-20 RX ADMIN — Medication 1000 MILLIGRAM(S): at 14:04

## 2025-07-20 RX ADMIN — Medication 1000 MILLIGRAM(S): at 22:28

## 2025-07-20 RX ADMIN — Medication 500 MILLIGRAM(S): at 18:48

## 2025-07-20 RX ADMIN — Medication 1000 MILLIGRAM(S): at 07:36

## 2025-07-20 RX ADMIN — OXYCODONE HYDROCHLORIDE 10 MILLIGRAM(S): 30 TABLET ORAL at 07:24

## 2025-07-20 RX ADMIN — OXYCODONE HYDROCHLORIDE 10 MILLIGRAM(S): 30 TABLET ORAL at 23:13

## 2025-07-20 RX ADMIN — HEPARIN SODIUM 7500 UNIT(S): 1000 INJECTION INTRAVENOUS; SUBCUTANEOUS at 22:28

## 2025-07-20 RX ADMIN — INSULIN LISPRO 4: 100 INJECTION, SOLUTION INTRAVENOUS; SUBCUTANEOUS at 18:52

## 2025-07-20 RX ADMIN — Medication 4 MILLIGRAM(S): at 08:44

## 2025-07-20 RX ADMIN — INSULIN LISPRO 4: 100 INJECTION, SOLUTION INTRAVENOUS; SUBCUTANEOUS at 07:12

## 2025-07-20 RX ADMIN — Medication 1000 MILLIGRAM(S): at 09:00

## 2025-07-20 RX ADMIN — METOPROLOL SUCCINATE 100 MILLIGRAM(S): 50 TABLET, EXTENDED RELEASE ORAL at 06:19

## 2025-07-20 RX ADMIN — INSULIN LISPRO 6 UNIT(S): 100 INJECTION, SOLUTION INTRAVENOUS; SUBCUTANEOUS at 09:59

## 2025-07-20 RX ADMIN — INSULIN LISPRO 2: 100 INJECTION, SOLUTION INTRAVENOUS; SUBCUTANEOUS at 11:50

## 2025-07-21 ENCOUNTER — TRANSCRIPTION ENCOUNTER (OUTPATIENT)
Age: 45
End: 2025-07-21

## 2025-07-21 DIAGNOSIS — E11.9 TYPE 2 DIABETES MELLITUS WITHOUT COMPLICATIONS: ICD-10-CM

## 2025-07-21 LAB
ANION GAP SERPL CALC-SCNC: 12 MMOL/L — SIGNIFICANT CHANGE UP (ref 5–17)
BUN SERPL-MCNC: 15 MG/DL — SIGNIFICANT CHANGE UP (ref 7–23)
CALCIUM SERPL-MCNC: 8.9 MG/DL — SIGNIFICANT CHANGE UP (ref 8.4–10.5)
CHLORIDE SERPL-SCNC: 101 MMOL/L — SIGNIFICANT CHANGE UP (ref 96–108)
CO2 SERPL-SCNC: 20 MMOL/L — LOW (ref 22–31)
CREAT SERPL-MCNC: 0.79 MG/DL — SIGNIFICANT CHANGE UP (ref 0.5–1.3)
EGFR: 94 ML/MIN/1.73M2 — SIGNIFICANT CHANGE UP
EGFR: 94 ML/MIN/1.73M2 — SIGNIFICANT CHANGE UP
GLUCOSE SERPL-MCNC: 201 MG/DL — HIGH (ref 70–99)
HCT VFR BLD CALC: 37.8 % — SIGNIFICANT CHANGE UP (ref 34.5–45)
HGB BLD-MCNC: 11.5 G/DL — SIGNIFICANT CHANGE UP (ref 11.5–15.5)
MAGNESIUM SERPL-MCNC: 1.7 MG/DL — SIGNIFICANT CHANGE UP (ref 1.6–2.6)
MCHC RBC-ENTMCNC: 22.6 PG — LOW (ref 27–34)
MCHC RBC-ENTMCNC: 30.4 G/DL — LOW (ref 32–36)
MCV RBC AUTO: 74.4 FL — LOW (ref 80–100)
NRBC # BLD AUTO: 0 K/UL — SIGNIFICANT CHANGE UP (ref 0–0)
NRBC # FLD: 0 K/UL — SIGNIFICANT CHANGE UP (ref 0–0)
NRBC BLD AUTO-RTO: 0 /100 WBCS — SIGNIFICANT CHANGE UP (ref 0–0)
PHOSPHATE SERPL-MCNC: 3.7 MG/DL — SIGNIFICANT CHANGE UP (ref 2.5–4.5)
PLATELET # BLD AUTO: 338 K/UL — SIGNIFICANT CHANGE UP (ref 150–400)
PMV BLD: 9.7 FL — SIGNIFICANT CHANGE UP (ref 7–13)
POTASSIUM SERPL-MCNC: 3.9 MMOL/L — SIGNIFICANT CHANGE UP (ref 3.5–5.3)
POTASSIUM SERPL-SCNC: 3.9 MMOL/L — SIGNIFICANT CHANGE UP (ref 3.5–5.3)
RBC # BLD: 5.08 M/UL — SIGNIFICANT CHANGE UP (ref 3.8–5.2)
RBC # FLD: 16.4 % — HIGH (ref 10.3–14.5)
SODIUM SERPL-SCNC: 133 MMOL/L — LOW (ref 135–145)
WBC # BLD: 9.54 K/UL — SIGNIFICANT CHANGE UP (ref 3.8–10.5)
WBC # FLD AUTO: 9.54 K/UL — SIGNIFICANT CHANGE UP (ref 3.8–10.5)

## 2025-07-21 PROCEDURE — 87481 CANDIDA DNA AMP PROBE: CPT

## 2025-07-21 PROCEDURE — 87591 N.GONORRHOEAE DNA AMP PROB: CPT

## 2025-07-21 PROCEDURE — 99233 SBSQ HOSP IP/OBS HIGH 50: CPT

## 2025-07-21 PROCEDURE — 83690 ASSAY OF LIPASE: CPT

## 2025-07-21 PROCEDURE — 84295 ASSAY OF SERUM SODIUM: CPT

## 2025-07-21 PROCEDURE — 81001 URINALYSIS AUTO W/SCOPE: CPT

## 2025-07-21 PROCEDURE — 83880 ASSAY OF NATRIURETIC PEPTIDE: CPT

## 2025-07-21 PROCEDURE — 85025 COMPLETE CBC W/AUTO DIFF WBC: CPT

## 2025-07-21 PROCEDURE — 99232 SBSQ HOSP IP/OBS MODERATE 35: CPT

## 2025-07-21 PROCEDURE — 93970 EXTREMITY STUDY: CPT

## 2025-07-21 PROCEDURE — 80048 BASIC METABOLIC PNL TOTAL CA: CPT

## 2025-07-21 PROCEDURE — 84132 ASSAY OF SERUM POTASSIUM: CPT

## 2025-07-21 PROCEDURE — 84484 ASSAY OF TROPONIN QUANT: CPT

## 2025-07-21 PROCEDURE — 82330 ASSAY OF CALCIUM: CPT

## 2025-07-21 PROCEDURE — 83036 HEMOGLOBIN GLYCOSYLATED A1C: CPT

## 2025-07-21 PROCEDURE — 84100 ASSAY OF PHOSPHORUS: CPT

## 2025-07-21 PROCEDURE — 83605 ASSAY OF LACTIC ACID: CPT

## 2025-07-21 PROCEDURE — 87491 CHLMYD TRACH DNA AMP PROBE: CPT

## 2025-07-21 PROCEDURE — 83735 ASSAY OF MAGNESIUM: CPT

## 2025-07-21 PROCEDURE — 73700 CT LOWER EXTREMITY W/O DYE: CPT

## 2025-07-21 PROCEDURE — 82962 GLUCOSE BLOOD TEST: CPT

## 2025-07-21 PROCEDURE — 87661 TRICHOMONAS VAGINALIS AMPLIF: CPT

## 2025-07-21 PROCEDURE — 86140 C-REACTIVE PROTEIN: CPT

## 2025-07-21 PROCEDURE — 81513 NFCT DS BV RNA VAG FLU ALG: CPT

## 2025-07-21 PROCEDURE — 82803 BLOOD GASES ANY COMBINATION: CPT

## 2025-07-21 PROCEDURE — 82010 KETONE BODYS QUAN: CPT

## 2025-07-21 PROCEDURE — 87040 BLOOD CULTURE FOR BACTERIA: CPT

## 2025-07-21 PROCEDURE — 85027 COMPLETE CBC AUTOMATED: CPT

## 2025-07-21 PROCEDURE — 84702 CHORIONIC GONADOTROPIN TEST: CPT

## 2025-07-21 PROCEDURE — 36415 COLL VENOUS BLD VENIPUNCTURE: CPT

## 2025-07-21 RX ORDER — HYDROMORPHONE/SOD CHLOR,ISO/PF 2 MG/10 ML
0.25 SYRINGE (ML) INJECTION ONCE
Refills: 0 | Status: DISCONTINUED | OUTPATIENT
Start: 2025-07-21 | End: 2025-07-21

## 2025-07-21 RX ORDER — INSULIN LISPRO 100 U/ML
18 INJECTION, SOLUTION INTRAVENOUS; SUBCUTANEOUS
Qty: 6 | Refills: 0
Start: 2025-07-21 | End: 2025-08-19

## 2025-07-21 RX ORDER — LINEZOLID 2 MG/ML
1 INJECTION, SOLUTION INTRAVENOUS
Qty: 21 | Refills: 0
Start: 2025-07-21 | End: 2025-07-30

## 2025-07-21 RX ORDER — INSULIN LISPRO 100 U/ML
0 INJECTION, SOLUTION INTRAVENOUS; SUBCUTANEOUS
Refills: 0
Start: 2025-07-21

## 2025-07-21 RX ORDER — MAGNESIUM SULFATE 500 MG/ML
1 SYRINGE (ML) INJECTION ONCE
Refills: 0 | Status: COMPLETED | OUTPATIENT
Start: 2025-07-21 | End: 2025-07-21

## 2025-07-21 RX ORDER — INSULIN LISPRO 100 U/ML
18 INJECTION, SOLUTION INTRAVENOUS; SUBCUTANEOUS
Qty: 3 | Refills: 0
Start: 2025-07-21 | End: 2025-08-03

## 2025-07-21 RX ORDER — INSULIN LISPRO 100 U/ML
18 INJECTION, SOLUTION INTRAVENOUS; SUBCUTANEOUS
Refills: 0 | Status: DISCONTINUED | OUTPATIENT
Start: 2025-07-21 | End: 2025-07-22

## 2025-07-21 RX ORDER — INSULIN GLARGINE-YFGN 100 [IU]/ML
0 INJECTION, SOLUTION SUBCUTANEOUS
Refills: 0
Start: 2025-07-21

## 2025-07-21 RX ORDER — INSULIN GLARGINE-YFGN 100 [IU]/ML
56 INJECTION, SOLUTION SUBCUTANEOUS AT BEDTIME
Refills: 0 | Status: DISCONTINUED | OUTPATIENT
Start: 2025-07-21 | End: 2025-07-22

## 2025-07-21 RX ORDER — INSULIN GLARGINE-YFGN 100 [IU]/ML
56 INJECTION, SOLUTION SUBCUTANEOUS
Qty: 3 | Refills: 0
Start: 2025-07-21 | End: 2025-08-03

## 2025-07-21 RX ORDER — OXYCODONE HYDROCHLORIDE 30 MG/1
1 TABLET ORAL
Qty: 8 | Refills: 0
Start: 2025-07-21

## 2025-07-21 RX ORDER — LINEZOLID 2 MG/ML
1 INJECTION, SOLUTION INTRAVENOUS
Refills: 0
Start: 2025-07-21

## 2025-07-21 RX ORDER — PSEUDOEPHEDRINE/ACETAMINOPHEN 30MG-325MG
0 TABLET ORAL
Refills: 0 | DISCHARGE

## 2025-07-21 RX ORDER — CEFPODOXIME PROXETIL 200 MG/1
2 TABLET, FILM COATED ORAL
Qty: 42 | Refills: 0
Start: 2025-07-21 | End: 2025-07-30

## 2025-07-21 RX ORDER — METRONIDAZOLE 250 MG
1 TABLET ORAL
Qty: 21 | Refills: 0
Start: 2025-07-21 | End: 2025-07-30

## 2025-07-21 RX ORDER — CEFPODOXIME PROXETIL 200 MG/1
1 TABLET, FILM COATED ORAL
Refills: 0
Start: 2025-07-21

## 2025-07-21 RX ORDER — INSULIN GLARGINE-YFGN 100 [IU]/ML
56 INJECTION, SOLUTION SUBCUTANEOUS
Refills: 0
Start: 2025-07-21 | End: 2025-08-04

## 2025-07-21 RX ORDER — METRONIDAZOLE 250 MG
1 TABLET ORAL
Refills: 0
Start: 2025-07-21

## 2025-07-21 RX ADMIN — INSULIN LISPRO 16 UNIT(S): 100 INJECTION, SOLUTION INTRAVENOUS; SUBCUTANEOUS at 08:44

## 2025-07-21 RX ADMIN — OXYCODONE HYDROCHLORIDE 10 MILLIGRAM(S): 30 TABLET ORAL at 06:15

## 2025-07-21 RX ADMIN — OXYCODONE HYDROCHLORIDE 10 MILLIGRAM(S): 30 TABLET ORAL at 00:04

## 2025-07-21 RX ADMIN — INSULIN LISPRO 2: 100 INJECTION, SOLUTION INTRAVENOUS; SUBCUTANEOUS at 12:44

## 2025-07-21 RX ADMIN — OXYCODONE HYDROCHLORIDE 10 MILLIGRAM(S): 30 TABLET ORAL at 19:07

## 2025-07-21 RX ADMIN — CEFEPIME 100 MILLIGRAM(S): 2 INJECTION, POWDER, FOR SOLUTION INTRAVENOUS at 22:25

## 2025-07-21 RX ADMIN — Medication 4 MILLIGRAM(S): at 12:44

## 2025-07-21 RX ADMIN — CEFEPIME 100 MILLIGRAM(S): 2 INJECTION, POWDER, FOR SOLUTION INTRAVENOUS at 06:15

## 2025-07-21 RX ADMIN — HEPARIN SODIUM 7500 UNIT(S): 1000 INJECTION INTRAVENOUS; SUBCUTANEOUS at 21:48

## 2025-07-21 RX ADMIN — CEFEPIME 100 MILLIGRAM(S): 2 INJECTION, POWDER, FOR SOLUTION INTRAVENOUS at 15:28

## 2025-07-21 RX ADMIN — INSULIN LISPRO 2: 100 INJECTION, SOLUTION INTRAVENOUS; SUBCUTANEOUS at 17:53

## 2025-07-21 RX ADMIN — Medication 0.25 MILLIGRAM(S): at 02:13

## 2025-07-21 RX ADMIN — Medication 4 MILLIGRAM(S): at 21:47

## 2025-07-21 RX ADMIN — Medication 500 MILLIGRAM(S): at 18:07

## 2025-07-21 RX ADMIN — INSULIN LISPRO 18 UNIT(S): 100 INJECTION, SOLUTION INTRAVENOUS; SUBCUTANEOUS at 17:54

## 2025-07-21 RX ADMIN — Medication 1000 MILLIGRAM(S): at 13:44

## 2025-07-21 RX ADMIN — INSULIN LISPRO 4: 100 INJECTION, SOLUTION INTRAVENOUS; SUBCUTANEOUS at 07:02

## 2025-07-21 RX ADMIN — Medication 100 GRAM(S): at 10:29

## 2025-07-21 RX ADMIN — LINEZOLID 600 MILLIGRAM(S): 2 INJECTION, SOLUTION INTRAVENOUS at 07:03

## 2025-07-21 RX ADMIN — Medication 1 APPLICATION(S): at 05:36

## 2025-07-21 RX ADMIN — OXYCODONE HYDROCHLORIDE 10 MILLIGRAM(S): 30 TABLET ORAL at 11:40

## 2025-07-21 RX ADMIN — Medication 0.25 MILLIGRAM(S): at 03:41

## 2025-07-21 RX ADMIN — OXYCODONE HYDROCHLORIDE 10 MILLIGRAM(S): 30 TABLET ORAL at 18:07

## 2025-07-21 RX ADMIN — OXYCODONE HYDROCHLORIDE 10 MILLIGRAM(S): 30 TABLET ORAL at 05:36

## 2025-07-21 RX ADMIN — HEPARIN SODIUM 7500 UNIT(S): 1000 INJECTION INTRAVENOUS; SUBCUTANEOUS at 13:44

## 2025-07-21 RX ADMIN — HEPARIN SODIUM 7500 UNIT(S): 1000 INJECTION INTRAVENOUS; SUBCUTANEOUS at 05:35

## 2025-07-21 RX ADMIN — Medication 500 MILLIGRAM(S): at 07:03

## 2025-07-21 RX ADMIN — INSULIN GLARGINE-YFGN 56 UNIT(S): 100 INJECTION, SOLUTION SUBCUTANEOUS at 22:25

## 2025-07-21 RX ADMIN — OXYCODONE HYDROCHLORIDE 10 MILLIGRAM(S): 30 TABLET ORAL at 12:40

## 2025-07-21 RX ADMIN — AMLODIPINE BESYLATE 5 MILLIGRAM(S): 10 TABLET ORAL at 19:11

## 2025-07-21 RX ADMIN — METOPROLOL SUCCINATE 100 MILLIGRAM(S): 50 TABLET, EXTENDED RELEASE ORAL at 05:36

## 2025-07-21 RX ADMIN — LINEZOLID 600 MILLIGRAM(S): 2 INJECTION, SOLUTION INTRAVENOUS at 18:07

## 2025-07-21 RX ADMIN — Medication 1000 MILLIGRAM(S): at 05:35

## 2025-07-21 RX ADMIN — Medication 0.25 MILLIGRAM(S): at 01:24

## 2025-07-22 VITALS
DIASTOLIC BLOOD PRESSURE: 84 MMHG | TEMPERATURE: 98 F | OXYGEN SATURATION: 97 % | SYSTOLIC BLOOD PRESSURE: 138 MMHG | HEART RATE: 91 BPM | RESPIRATION RATE: 17 BRPM

## 2025-07-22 LAB
ANION GAP SERPL CALC-SCNC: 14 MMOL/L — SIGNIFICANT CHANGE UP (ref 5–17)
BUN SERPL-MCNC: 17 MG/DL — SIGNIFICANT CHANGE UP (ref 7–23)
CALCIUM SERPL-MCNC: 8.7 MG/DL — SIGNIFICANT CHANGE UP (ref 8.4–10.5)
CHLORIDE SERPL-SCNC: 101 MMOL/L — SIGNIFICANT CHANGE UP (ref 96–108)
CO2 SERPL-SCNC: 19 MMOL/L — LOW (ref 22–31)
CREAT SERPL-MCNC: 0.86 MG/DL — SIGNIFICANT CHANGE UP (ref 0.5–1.3)
EGFR: 85 ML/MIN/1.73M2 — SIGNIFICANT CHANGE UP
EGFR: 85 ML/MIN/1.73M2 — SIGNIFICANT CHANGE UP
GLUCOSE SERPL-MCNC: 216 MG/DL — HIGH (ref 70–99)
HCT VFR BLD CALC: 38.3 % — SIGNIFICANT CHANGE UP (ref 34.5–45)
HGB BLD-MCNC: 11.6 G/DL — SIGNIFICANT CHANGE UP (ref 11.5–15.5)
MAGNESIUM SERPL-MCNC: 1.8 MG/DL — SIGNIFICANT CHANGE UP (ref 1.6–2.6)
MCHC RBC-ENTMCNC: 22.7 PG — LOW (ref 27–34)
MCHC RBC-ENTMCNC: 30.3 G/DL — LOW (ref 32–36)
MCV RBC AUTO: 75.1 FL — LOW (ref 80–100)
NRBC # BLD AUTO: 0 K/UL — SIGNIFICANT CHANGE UP (ref 0–0)
NRBC # FLD: 0 K/UL — SIGNIFICANT CHANGE UP (ref 0–0)
NRBC BLD AUTO-RTO: 0 /100 WBCS — SIGNIFICANT CHANGE UP (ref 0–0)
PHOSPHATE SERPL-MCNC: 3.6 MG/DL — SIGNIFICANT CHANGE UP (ref 2.5–4.5)
PLATELET # BLD AUTO: 364 K/UL — SIGNIFICANT CHANGE UP (ref 150–400)
PMV BLD: 9.3 FL — SIGNIFICANT CHANGE UP (ref 7–13)
POTASSIUM SERPL-MCNC: 3.9 MMOL/L — SIGNIFICANT CHANGE UP (ref 3.5–5.3)
POTASSIUM SERPL-SCNC: 3.9 MMOL/L — SIGNIFICANT CHANGE UP (ref 3.5–5.3)
RBC # BLD: 5.1 M/UL — SIGNIFICANT CHANGE UP (ref 3.8–5.2)
RBC # FLD: 16.6 % — HIGH (ref 10.3–14.5)
SODIUM SERPL-SCNC: 134 MMOL/L — LOW (ref 135–145)
WBC # BLD: 11.53 K/UL — HIGH (ref 3.8–10.5)
WBC # FLD AUTO: 11.53 K/UL — HIGH (ref 3.8–10.5)

## 2025-07-22 PROCEDURE — 87491 CHLMYD TRACH DNA AMP PROBE: CPT

## 2025-07-22 PROCEDURE — 99232 SBSQ HOSP IP/OBS MODERATE 35: CPT

## 2025-07-22 PROCEDURE — 85025 COMPLETE CBC W/AUTO DIFF WBC: CPT

## 2025-07-22 PROCEDURE — 86140 C-REACTIVE PROTEIN: CPT

## 2025-07-22 PROCEDURE — 80048 BASIC METABOLIC PNL TOTAL CA: CPT

## 2025-07-22 PROCEDURE — 73700 CT LOWER EXTREMITY W/O DYE: CPT

## 2025-07-22 PROCEDURE — 96375 TX/PRO/DX INJ NEW DRUG ADDON: CPT

## 2025-07-22 PROCEDURE — 99285 EMERGENCY DEPT VISIT HI MDM: CPT | Mod: 25

## 2025-07-22 PROCEDURE — 99233 SBSQ HOSP IP/OBS HIGH 50: CPT

## 2025-07-22 PROCEDURE — 87661 TRICHOMONAS VAGINALIS AMPLIF: CPT

## 2025-07-22 PROCEDURE — 81513 NFCT DS BV RNA VAG FLU ALG: CPT

## 2025-07-22 PROCEDURE — 93970 EXTREMITY STUDY: CPT

## 2025-07-22 PROCEDURE — 83036 HEMOGLOBIN GLYCOSYLATED A1C: CPT

## 2025-07-22 PROCEDURE — 36415 COLL VENOUS BLD VENIPUNCTURE: CPT

## 2025-07-22 PROCEDURE — 83690 ASSAY OF LIPASE: CPT

## 2025-07-22 PROCEDURE — 82010 KETONE BODYS QUAN: CPT

## 2025-07-22 PROCEDURE — 96365 THER/PROPH/DIAG IV INF INIT: CPT

## 2025-07-22 PROCEDURE — 82962 GLUCOSE BLOOD TEST: CPT

## 2025-07-22 PROCEDURE — 96367 TX/PROPH/DG ADDL SEQ IV INF: CPT

## 2025-07-22 PROCEDURE — 96372 THER/PROPH/DIAG INJ SC/IM: CPT

## 2025-07-22 PROCEDURE — 82330 ASSAY OF CALCIUM: CPT

## 2025-07-22 PROCEDURE — 85027 COMPLETE CBC AUTOMATED: CPT

## 2025-07-22 PROCEDURE — 87040 BLOOD CULTURE FOR BACTERIA: CPT

## 2025-07-22 PROCEDURE — 84484 ASSAY OF TROPONIN QUANT: CPT

## 2025-07-22 PROCEDURE — 84702 CHORIONIC GONADOTROPIN TEST: CPT

## 2025-07-22 PROCEDURE — 83880 ASSAY OF NATRIURETIC PEPTIDE: CPT

## 2025-07-22 PROCEDURE — 81001 URINALYSIS AUTO W/SCOPE: CPT

## 2025-07-22 PROCEDURE — 82803 BLOOD GASES ANY COMBINATION: CPT

## 2025-07-22 PROCEDURE — 83735 ASSAY OF MAGNESIUM: CPT

## 2025-07-22 PROCEDURE — 84295 ASSAY OF SERUM SODIUM: CPT

## 2025-07-22 PROCEDURE — 84132 ASSAY OF SERUM POTASSIUM: CPT

## 2025-07-22 PROCEDURE — 83605 ASSAY OF LACTIC ACID: CPT

## 2025-07-22 PROCEDURE — 87481 CANDIDA DNA AMP PROBE: CPT

## 2025-07-22 PROCEDURE — 84100 ASSAY OF PHOSPHORUS: CPT

## 2025-07-22 PROCEDURE — 87591 N.GONORRHOEAE DNA AMP PROB: CPT

## 2025-07-22 PROCEDURE — 96376 TX/PRO/DX INJ SAME DRUG ADON: CPT

## 2025-07-22 RX ADMIN — Medication 1 APPLICATION(S): at 08:18

## 2025-07-22 RX ADMIN — INSULIN LISPRO 18 UNIT(S): 100 INJECTION, SOLUTION INTRAVENOUS; SUBCUTANEOUS at 08:51

## 2025-07-22 RX ADMIN — OXYCODONE HYDROCHLORIDE 10 MILLIGRAM(S): 30 TABLET ORAL at 02:08

## 2025-07-22 RX ADMIN — Medication 500 MILLIGRAM(S): at 06:40

## 2025-07-22 RX ADMIN — OXYCODONE HYDROCHLORIDE 10 MILLIGRAM(S): 30 TABLET ORAL at 08:48

## 2025-07-22 RX ADMIN — Medication 4 MILLIGRAM(S): at 08:50

## 2025-07-22 RX ADMIN — HEPARIN SODIUM 7500 UNIT(S): 1000 INJECTION INTRAVENOUS; SUBCUTANEOUS at 06:41

## 2025-07-22 RX ADMIN — CEFEPIME 100 MILLIGRAM(S): 2 INJECTION, POWDER, FOR SOLUTION INTRAVENOUS at 06:40

## 2025-07-22 RX ADMIN — LINEZOLID 600 MILLIGRAM(S): 2 INJECTION, SOLUTION INTRAVENOUS at 06:41

## 2025-07-22 RX ADMIN — OXYCODONE HYDROCHLORIDE 10 MILLIGRAM(S): 30 TABLET ORAL at 09:48

## 2025-07-22 RX ADMIN — OXYCODONE HYDROCHLORIDE 10 MILLIGRAM(S): 30 TABLET ORAL at 01:08

## 2025-07-22 RX ADMIN — INSULIN LISPRO 2: 100 INJECTION, SOLUTION INTRAVENOUS; SUBCUTANEOUS at 08:51

## 2025-07-29 DIAGNOSIS — Z90.79 ACQUIRED ABSENCE OF OTHER GENITAL ORGAN(S): ICD-10-CM

## 2025-07-29 DIAGNOSIS — Z88.8 ALLERGY STATUS TO OTHER DRUGS, MEDICAMENTS AND BIOLOGICAL SUBSTANCES: ICD-10-CM

## 2025-07-29 DIAGNOSIS — Z79.01 LONG TERM (CURRENT) USE OF ANTICOAGULANTS: ICD-10-CM

## 2025-07-29 DIAGNOSIS — Z79.84 LONG TERM (CURRENT) USE OF ORAL HYPOGLYCEMIC DRUGS: ICD-10-CM

## 2025-07-29 DIAGNOSIS — G43.909 MIGRAINE, UNSPECIFIED, NOT INTRACTABLE, WITHOUT STATUS MIGRAINOSUS: ICD-10-CM

## 2025-07-29 DIAGNOSIS — Z79.4 LONG TERM (CURRENT) USE OF INSULIN: ICD-10-CM

## 2025-07-29 DIAGNOSIS — E78.5 HYPERLIPIDEMIA, UNSPECIFIED: ICD-10-CM

## 2025-07-29 DIAGNOSIS — Z88.1 ALLERGY STATUS TO OTHER ANTIBIOTIC AGENTS: ICD-10-CM

## 2025-07-29 DIAGNOSIS — E11.65 TYPE 2 DIABETES MELLITUS WITH HYPERGLYCEMIA: ICD-10-CM

## 2025-07-29 DIAGNOSIS — I25.10 ATHEROSCLEROTIC HEART DISEASE OF NATIVE CORONARY ARTERY WITHOUT ANGINA PECTORIS: ICD-10-CM

## 2025-07-29 DIAGNOSIS — Z95.5 PRESENCE OF CORONARY ANGIOPLASTY IMPLANT AND GRAFT: ICD-10-CM

## 2025-07-29 DIAGNOSIS — E87.20 ACIDOSIS, UNSPECIFIED: ICD-10-CM

## 2025-07-29 DIAGNOSIS — E87.6 HYPOKALEMIA: ICD-10-CM

## 2025-07-29 DIAGNOSIS — J45.909 UNSPECIFIED ASTHMA, UNCOMPLICATED: ICD-10-CM

## 2025-07-29 DIAGNOSIS — I69.354 HEMIPLEGIA AND HEMIPARESIS FOLLOWING CEREBRAL INFARCTION AFFECTING LEFT NON-DOMINANT SIDE: ICD-10-CM

## 2025-07-29 DIAGNOSIS — Z88.0 ALLERGY STATUS TO PENICILLIN: ICD-10-CM

## 2025-07-29 DIAGNOSIS — Z86.711 PERSONAL HISTORY OF PULMONARY EMBOLISM: ICD-10-CM

## 2025-07-29 DIAGNOSIS — I10 ESSENTIAL (PRIMARY) HYPERTENSION: ICD-10-CM

## 2025-07-29 DIAGNOSIS — E66.9 OBESITY, UNSPECIFIED: ICD-10-CM

## 2025-07-29 DIAGNOSIS — Z87.891 PERSONAL HISTORY OF NICOTINE DEPENDENCE: ICD-10-CM

## 2025-07-29 DIAGNOSIS — D72.829 ELEVATED WHITE BLOOD CELL COUNT, UNSPECIFIED: ICD-10-CM
